# Patient Record
Sex: MALE | Race: WHITE | NOT HISPANIC OR LATINO | Employment: OTHER | URBAN - METROPOLITAN AREA
[De-identification: names, ages, dates, MRNs, and addresses within clinical notes are randomized per-mention and may not be internally consistent; named-entity substitution may affect disease eponyms.]

---

## 2017-01-04 ENCOUNTER — APPOINTMENT (OUTPATIENT)
Dept: WOUND CARE | Facility: MEDICAL CENTER | Age: 72
End: 2017-01-04
Payer: MEDICARE

## 2017-03-06 ENCOUNTER — TELEPHONE (OUTPATIENT)
Dept: ENDOCRINOLOGY | Facility: MEDICAL CENTER | Age: 72
End: 2017-03-06

## 2017-03-06 DIAGNOSIS — E11.40 TYPE 2 DIABETES MELLITUS WITH DIABETIC NEUROPATHY, WITH LONG-TERM CURRENT USE OF INSULIN (HCC): ICD-10-CM

## 2017-03-06 DIAGNOSIS — Z79.4 TYPE 2 DIABETES MELLITUS WITH DIABETIC NEUROPATHY, WITH LONG-TERM CURRENT USE OF INSULIN (HCC): ICD-10-CM

## 2017-03-06 NOTE — TELEPHONE ENCOUNTER
Kindly put a new Rx for his Humalog kwikpen and D/C Humalog vials.    Pt using 30-35 Units. Use as directed up to 100 units/day.    Rx will be send to [facility].    Thank you  Mariluz

## 2017-03-14 ENCOUNTER — TELEPHONE (OUTPATIENT)
Dept: CARDIOLOGY | Facility: MEDICAL CENTER | Age: 72
End: 2017-03-14

## 2017-03-14 NOTE — TELEPHONE ENCOUNTER
----- Message from Bebo Kathleen sent at 3/14/2017  1:46 PM PDT -----  Regarding: Pt wants to discuss medication's  JW/Marla,    Patient want's to discuss his medication's. Please call cell # [phone] to discuss.

## 2017-03-14 NOTE — TELEPHONE ENCOUNTER
Pt was notified. He'll just see Dr. Patterson on Wed for med review & adjustment & if MD's there want to discharge him on anything different he'll follow their protocol.

## 2017-03-14 NOTE — TELEPHONE ENCOUNTER
Dr. Patterson : Pt. currently receiving chemo for non- Hodgkins lymphoma at [facility]. Also suffered CVA in Dec. Coming back to [place] this Sat. Out of his valsartan HCTZ 320/25 & will need new Rx. MD's there asking if you want to continue this?  Clonidine was stopped due to hypotension. They added spironolactone 50 mg QD for low K+. Also on furosemide, labetalol, & amlodipine as per med list.  BP now runs 110-120/70. I made FV with you next Wed., but in meantime do you want to continue the valsartan HCTZ? Same dose or lower? ([facility])

## 2017-03-14 NOTE — TELEPHONE ENCOUNTER
If he is still on it then it is too much. I had not heard of any of this. I certainly agree with stopping the clonidine. If he is now on spironolactone and furosemide he probably should not also be on hydrochlorothiazide. We need to get him in as soon as possible to review all this. If he is still on the 320/25 he had to cut it in half until I see him and I will rewrite a new prescription but I am not sure why they have not adjusted his medications there since they made the other changes.  Did they know he was on the valsartan/HCT?  That was part of his older regimen and clearly needs to be reassessed but I am way behind in terms of what has been going on and do not feel comfortable adjusting his medications without looking at him. I would hope the MDs there make that decision.  I will be happy to follow him up after his back here and continue whatever regimen has been working there.

## 2017-03-22 ENCOUNTER — OFFICE VISIT (OUTPATIENT)
Dept: CARDIOLOGY | Facility: MEDICAL CENTER | Age: 72
End: 2017-03-22
Payer: MEDICARE

## 2017-03-22 VITALS
HEART RATE: 94 BPM | WEIGHT: 215 LBS | HEIGHT: 72 IN | OXYGEN SATURATION: 94 % | BODY MASS INDEX: 29.12 KG/M2 | SYSTOLIC BLOOD PRESSURE: 100 MMHG | DIASTOLIC BLOOD PRESSURE: 60 MMHG

## 2017-03-22 DIAGNOSIS — I11.9 BENIGN HYPERTENSIVE HEART DISEASE WITHOUT HEART FAILURE: ICD-10-CM

## 2017-03-22 DIAGNOSIS — I25.10 ATHEROSCLEROSIS OF NATIVE CORONARY ARTERY OF NATIVE HEART WITHOUT ANGINA PECTORIS: ICD-10-CM

## 2017-03-22 DIAGNOSIS — Z86.73 HISTORY OF ARTERIAL ISCHEMIC STROKE: ICD-10-CM

## 2017-03-22 DIAGNOSIS — E78.2 MIXED HYPERLIPIDEMIA: ICD-10-CM

## 2017-03-22 PROBLEM — C85.90 LYMPHOMA (HCC): Status: ACTIVE | Noted: 2017-02-19

## 2017-03-22 PROBLEM — I10 ESSENTIAL HYPERTENSION, BENIGN: Status: ACTIVE | Noted: 2017-03-22

## 2017-03-22 PROCEDURE — G8598 ASA/ANTIPLAT THER USED: HCPCS | Performed by: INTERNAL MEDICINE

## 2017-03-22 PROCEDURE — 4040F PNEUMOC VAC/ADMIN/RCVD: CPT | Mod: 8P | Performed by: INTERNAL MEDICINE

## 2017-03-22 PROCEDURE — 3017F COLORECTAL CA SCREEN DOC REV: CPT | Mod: 8P | Performed by: INTERNAL MEDICINE

## 2017-03-22 PROCEDURE — G8432 DEP SCR NOT DOC, RNG: HCPCS | Performed by: INTERNAL MEDICINE

## 2017-03-22 PROCEDURE — G8484 FLU IMMUNIZE NO ADMIN: HCPCS | Performed by: INTERNAL MEDICINE

## 2017-03-22 PROCEDURE — 99214 OFFICE O/P EST MOD 30 MIN: CPT | Performed by: INTERNAL MEDICINE

## 2017-03-22 PROCEDURE — 1036F TOBACCO NON-USER: CPT | Performed by: INTERNAL MEDICINE

## 2017-03-22 PROCEDURE — 1101F PT FALLS ASSESS-DOCD LE1/YR: CPT | Mod: 8P | Performed by: INTERNAL MEDICINE

## 2017-03-22 PROCEDURE — G8417 CALC BMI ABV UP PARAM F/U: HCPCS | Performed by: INTERNAL MEDICINE

## 2017-03-22 RX ORDER — ATORVASTATIN CALCIUM 40 MG/1
20 TABLET, FILM COATED ORAL DAILY
Qty: 90 TAB | Refills: 3
Start: 2017-03-22 | End: 2018-03-05 | Stop reason: SDUPTHER

## 2017-03-22 RX ORDER — SULFAMETHOXAZOLE AND TRIMETHOPRIM 800; 160 MG/1; MG/1
1 TABLET ORAL 2 TIMES DAILY
COMMUNITY
End: 2017-09-29

## 2017-03-22 RX ORDER — CALCIUM CARBONATE/VITAMIN D3 500-10/5ML
400 LIQUID (ML) ORAL DAILY
COMMUNITY
End: 2017-09-29 | Stop reason: DRUGHIGH

## 2017-03-22 RX ORDER — PREDNISONE 10 MG/1
10 TABLET ORAL DAILY
COMMUNITY
End: 2017-09-29

## 2017-03-22 RX ORDER — OMEPRAZOLE 20 MG/1
20 CAPSULE, DELAYED RELEASE ORAL DAILY
COMMUNITY
End: 2017-09-29

## 2017-03-22 RX ORDER — SPIRONOLACTONE 50 MG/1
50 TABLET, FILM COATED ORAL DAILY
COMMUNITY
End: 2017-10-09 | Stop reason: SDUPTHER

## 2017-03-22 RX ORDER — AMLODIPINE BESYLATE 5 MG/1
5 TABLET ORAL DAILY
Qty: 90 TAB | Refills: 3
Start: 2017-03-22 | End: 2017-09-22

## 2017-03-22 RX ORDER — ACYCLOVIR 400 MG/1
400 TABLET ORAL 2 TIMES DAILY
COMMUNITY
End: 2017-09-29

## 2017-03-22 RX ORDER — LORATADINE 10 MG/1
10 TABLET ORAL DAILY
COMMUNITY
End: 2017-09-29 | Stop reason: ALTCHOICE

## 2017-03-22 RX ORDER — FENOFIBRATE 200 MG/1
200 CAPSULE ORAL
COMMUNITY
End: 2017-03-22

## 2017-03-22 ASSESSMENT — ENCOUNTER SYMPTOMS
PND: 0
BRUISES/BLEEDS EASILY: 0
WHEEZING: 0
CHILLS: 0
WEAKNESS: 1
MYALGIAS: 0
NERVOUS/ANXIOUS: 0
SEIZURES: 0
HEADACHES: 0
LOSS OF CONSCIOUSNESS: 0
FOCAL WEAKNESS: 1
CLAUDICATION: 0
PALPITATIONS: 0
FALLS: 0
DEPRESSION: 0
COUGH: 0
BLOOD IN STOOL: 0
FEVER: 0
ORTHOPNEA: 0
POLYDIPSIA: 0
FLANK PAIN: 0

## 2017-03-22 ASSESSMENT — LIFESTYLE VARIABLES: SUBSTANCE_ABUSE: 0

## 2017-03-22 NOTE — Clinical Note
[facility]   [address]  [place] [address]  Phone: [phone]  Fax: [phone]              Av Bob  1945    Encounter Date: 3/22/2017    Larry Patterson M.D.          PROGRESS NOTE:  Subjective:   Av Bob is a 71 y.o. male who presents today for follow-up of coronary artery disease. This has been an extraordinary interval since his last visit. In December of last year he was visiting [facility] and developed left-sided weakness and confusion and apparently some speech slurring and was taken to [facility] where evidence of a stroke was demonstrated. He was still on his full dose aspirin plus Plavix as previously discussed because of the recommendation of his cardiologist at [facility] for extended dual antiplatelet therapy.  (I do not have records documenting the reason for this but apparently related to the complexity of his interventions in 2009.) There was no hemorrhagic component to his stroke. Apparently no significant carotid disease was demonstrated and an echocardiogram apparently did not suggest a cardiac source for embolus. He and his wife are the sources for this info.   No angina, palpitation or dyspnea. Apparently no AF noted, anticoagulation was never discussed.    Past Medical History   Diagnosis Date   • Hypertension    • Mixed hyperlipidemia    • Nephrolithiasis 2006   • Hypokalemia 2012   • Wound of left leg 2012     Requiring surgery and debridment, Dr. Moore   • Type II or unspecified type diabetes mellitus with neurological manifestations, uncontrolled 9/11/2013   • Polyneuropathy in diabetes(357.2) 9/11/2013   • Coronary atherosclerosis of native coronary artery    • S/P PTCA (percutaneous transluminal coronary angioplasty), RCA, 5/1997, patent 7/10/2009    • Presence of drug coated stent in left circumflex coronary artery      Overlapping 2.5mm Taxus  and Orange stents in OM, 7/10/2009    • Presence of drug coated stent in LAD coronary artery      Overlapping Orange 3.0 stents, 7/10/2009    • Controlled gout 2014   • CKD (chronic kidney disease) stage 3, GFR 30-59 ml/min    • Skin ulcer of calf (CMS-HCC) 2015     Dr. Terry and wound care   • Leg wound, right 9/2016     Followed in wound care   • Cerebrovascular accident (CVA) (CMS-HCC) 12/30/2016     Left hemiparesis, etiology of stroke not established, lymphoma discovered on MRI evaluation of stroke   • Lymphoma (CMS-HCC) 2/19/2017     Large cell     Past Surgical History   Procedure Laterality Date   • Tonsillectomy and adenoidectomy     • Lithotripsy     • Angioplasty  1997     RCA followed by other stents as noted above.    • Wound closure general  4/3/2012     [facility]   • Tonsillectomy and adenoidectomy     • Cataract extraction with iol       bilateral     Family History   Problem Relation Age of Onset   • Heart Disease Father      CAD   • Diabetes Father    • Cancer Mother    • Kidney stones Brother    • Heart Disease Brother      History   Smoking status   • Never Smoker    Smokeless tobacco   • Never Used     Allergies   Allergen Reactions   • Lorazepam Unspecified     Disorientation   • Nkda [No Known Drug Allergy]      Outpatient Encounter Prescriptions as of 3/22/2017   Medication Sig Dispense Refill   • nystatin (MYCOSTATIN) Powder Apply  to affected area(s) 3 times a day.     • predniSONE (DELTASONE) 10 MG Tab Take 10 mg by mouth every day.     • acyclovir (ZOVIRAX) 400 MG tablet Take 400 mg by mouth 2 times a day.     • ascorbic acid (VITAMIN C) 500 MG/5ML syrup Take  by mouth every day.     • fenofibrate micronized (LOFIBRA) 200 MG capsule Take 200 mg by mouth every morning before breakfast.     • loratadine (CLARITIN) 10 MG Tab Take 10 mg by mouth every day.     • Magnesium Oxide 400 MG Cap Take  by mouth.     • omeprazole (PRILOSEC)  20 MG delayed-release capsule Take 20 mg by mouth every day.     • spironolactone (ALDACTONE) 50 MG Tab Take 50 mg by mouth every day.     • sulfamethoxazole-trimethoprim (BACTRIM DS) 800-160 MG tablet Take 1 Tab by mouth 2 times a day.     • amlodipine (NORVASC) 5 MG Tab Take 1 Tab by mouth every day. 90 Tab 3   • insulin lispro, Human, (HUMALOG KWIKPEN) 100 UNIT/ML Solution Pen-injector injection Inject 35 Units as instructed 3 times a day before meals. 30 mL 6   • Insulin Glargine (TOUJEO SOLOSTAR SC) Inject 80 Units as instructed.     • aspirin (ASA) 81 MG Chew Tab chewable tablet Take 1 Tab by mouth every day. 100 Tab 11   • Omega-3 Fatty Acids (OMEGA-3 FISH OIL PO) Take  by mouth.     • metformin ER (GLUCOPHAGE XR) 500 MG TABLET SR 24 HR Take 1 Tab by mouth 2 times a day. 60 Tab 5   • insulin glargine (LANTUS SOLOSTAR) 100 UNIT/ML Solution Pen-injector injection Inject 72 Units as instructed every evening. 90 mL 2   • clopidogrel (PLAVIX) 75 MG Tab Take 1 Tab by mouth every day. 90 Tab 3   • valsartan-hydrochlorothiazide (DIOVAN-HCT) 320-25 MG per tablet TAKE 1 TABLET DAILY 90 Tab 3   • labetalol (NORMODYNE) 100 MG Tab TAKE 1 TABLET TWICE A  Tab 3   • potassium chloride SA (KLOR-CON M20) 20 MEQ Tab CR TAKE 2 TABLETS THREE TIMES A  Tab 3   • atorvastatin (LIPITOR) 40 MG Tab Take 1 Tab by mouth every day. 90 Tab 3   • allopurinol (ZYLOPRIM) 300 MG Tab Take 1 Tab by mouth every day. 90 Tab 3   • fenofibrate (TRICOR) 145 MG Tab Take 1 Tab by mouth every day. 90 Tab 3   • nitroglycerin (NITROSTAT) 0.4 MG SL Tab Place 1 Tab under tongue as needed for Chest Pain. 25 Tab 6   • B-D UF III MINI PEN NEEDLES 31G X 5 MM Misc USE AS DIRECTED WITH INSULIN INJECTIONS 450 Each 2   • CENTRUM SILVER PO QDAY.      • [DISCONTINUED] amlodipine (NORVASC) 5 MG Tab Take 1 Tab by mouth 2 Times a Day. 180 Tab 3   • [DISCONTINUED] furosemide (LASIX) 20 MG Tab Take 1 Tab by mouth every day. 90 Tab 3   • Cyanocobalamin  "(VITAMIN B-12) 1000 MCG SUBL Place  under tongue every Sunday.       No facility-administered encounter medications on file as of 3/22/2017.     Review of Systems   Constitutional: Negative for fever and chills.   HENT: Negative for nosebleeds.    Respiratory: Negative for cough and wheezing.    Cardiovascular: Positive for leg swelling (edema has much improved after hospitalization). Negative for chest pain, palpitations, orthopnea, claudication and PND.   Gastrointestinal: Negative for blood in stool and melena.   Genitourinary: Negative for hematuria and flank pain.   Musculoskeletal: Negative for myalgias and falls.   Skin: Negative for rash.   Neurological: Positive for focal weakness (residual left hemiparesis) and weakness (he is noting bilateral leg weakness). Negative for seizures, loss of consciousness and headaches.   Endo/Heme/Allergies: Negative for polydipsia. Does not bruise/bleed easily.   Psychiatric/Behavioral: Negative for depression and substance abuse. The patient is not nervous/anxious.         Objective:   /60 mmHg  Pulse 94  Ht 1.829 m (6' 0.01\")  Wt 97.523 kg (215 lb)  BMI 29.15 kg/m2  SpO2 94%    Physical Exam   Constitutional: He is oriented to person, place, and time. He appears well-developed and well-nourished.   Eyes: Conjunctivae are normal. No scleral icterus.   Neck: No JVD present.   Cardiovascular: Normal rate, regular rhythm, normal heart sounds and intact distal pulses.  Exam reveals no gallop.    No murmur heard.  Pulmonary/Chest: Effort normal and breath sounds normal.   Musculoskeletal: He exhibits no edema.   Neurological: He is alert and oriented to person, place, and time.   Skin: Skin is warm and dry.   Psychiatric: He has a normal mood and affect. Thought content normal.       Assessment:     1. Atherosclerosis of native coronary artery of native heart without angina pectoris     2. Benign hypertensive heart disease without heart failure      COMP METABOLIC " PANEL    BASIC METABOLIC PANEL   3. Mixed hyperlipidemia  LIPID PANEL    TSH   4. History of arterial ischemic stroke       His coronary disease is clinically stable. His blood pressure is over controlled and his oncologist in [place] would certainly like him on less medicine. He is on a complex combination of medications now some of which were begun during his initial cardiovascular care at [facility] years ago and some here and then some added during his recent stays at [facility]. The question of duration of Plavix therapy continues since he is over 8 years out from his last stent and the recent stroke occurred while he was on aspirin and Plavix and therefore they weren't protective from that. I'm still not sure what the etiology of that event was.  He is also now on a higher dose of fenofibrate and still 40 mg of atorvastatin. He's also on furosemide and HCTZ plus spironolactone.  His edema resolved and seemed to relate to his prolonged recovery from the complex illness beginning with a stroke and culminating in the diagnosis and treatment of lymphoma.  I'm also concerned about the possible muscle effects of the high-dose fenofibrate and atorvastatin at 40 mg daily.    Medical Decision Making:  Today's Assessment / Status / Plan:     Stop furosemide. Continue the spironolactone. He may be better served with carvedilol at this point than with labetalol but I didn't change that today. Continue the potassium for now since he tends to be significantly hypokalemic but check potassium within 5 days on the current doses of valsartan, hydrochlorothiazide and spironolactone.   Reduce the fenofibrate back to 145 mg daily and reduce atorvastatin to 20 mg daily.  Check the CMP, lipids and TSH in 3-4 weeks.  Discuss with his oncologist, Dr. Fausto Rivera, the advisability of stopping Plavix and/or reducing aspirin dose at this point ( he is actually still on 325mg daily) and I suggested he discuss that with his current  cardiologist in [place] who has replaced Dr. Levine since he may have access to the records documenting the reason for the extended dual antiplatelet therapy. His score for extended DAPT is ~ 2 based on the information I have but bleeding risk may now preclude it plus he is 8+ years out. Dr. Rivera is also supervising his cardio oncology follow-up with scheduled reassessments of left ventricular function at [facility]. I requested the records detailing the nature and workup of his stroke. He will continue follow-up with Dr. Moon as well. Use of emergency medical system reviewed for any unstable symptoms.      No Recipients

## 2017-03-22 NOTE — MR AVS SNAPSHOT
"Av Bob   3/22/2017 8:20 AM   Office Visit   MRN: [id number]    Department:  [facility]   Dept Phone:  [phone]    Description:  Male : 1945   Provider:  Larry Patterson M.D.           Allergies as of 3/22/2017     Allergen Noted Reactions    Nkda [No Known Drug Allergy] 2008         You were diagnosed with     Benign hypertensive heart disease without heart failure   [402.10.ICD-9-CM]       Mixed hyperlipidemia   [272.2.ICD-9-CM]         Vital Signs     Blood Pressure Pulse Height Weight Body Mass Index Oxygen Saturation    100/60 mmHg 94 1.829 m (6' 0.01\") 97.523 kg (215 lb) 29.15 kg/m2 94%    Smoking Status                   Never Smoker            Basic Information     Date Of Birth Sex Race Ethnicity Preferred Language    1945 Male White Non- English      Your appointments     Mar 23, 2017  9:00 AM   Diabetes Care Visit with Trinidad Maguire M.D., ENDOCRINOLOGY DIABETES RN   [facility] ([facility])    [address]  [facility] [address]   [phone]           You will be receiving a confirmation call a few days before your appointment from our automated call confirmation system.            2017 11:40 AM   FOLLOW UP with Larry Patterson M.D.   [facility] (--)    [address]  [place] [address] [phone]            2017 11:00 AM   FOLLOW UP with Larry Patterson M.D.   [facility] (--)    [address]  [place] [id number] [phone]              Problem List              ICD-10-CM Priority Class Noted - Resolved    Presence of drug coated stent in LAD coronary artery Z95.5   2011 - Present    Presence of drug coated stent in left circumflex coronary artery Z95.5   2011 - Present    S/P PTCA (percutaneous transluminal coronary " angioplasty), RCA, 5/1997, patent 7/10/2009 Z98.61   12/13/2011 - Present    Mixed hyperlipidemia E78.2   12/13/2011 - Present    Benign hypertensive heart disease without heart failure I11.9   12/13/2011 - Present    Hypokalemia E87.6   12/20/2012 - Present    Diabetic polyneuropathy (CMS-HCC) E11.42   9/11/2013 - Present    Encounter for long-term (current) use of insulin (CMS-HCC) Z79.4   9/11/2013 - Present    Controlled gout M10.9   1/28/2014 - Present    Coronary atherosclerosis of native coronary artery I25.10   5/7/2014 - Present    Old myocardial infarction I25.2   5/7/2014 - Present    Skin ulcer of calf (CMS-HCC) L97.209   11/23/2015 - Present    Long-term use of high-risk medication Z79.899   8/18/2016 - Present    DM (diabetes mellitus) type II controlled, neurological manifestation (CMS-HCC) E11.49   8/25/2016 - Present    CKD (chronic kidney disease) stage 3, GFR 30-59 ml/min N18.3   8/25/2016 - Present      Health Maintenance        Date Due Completion Dates    IMM DTaP/Tdap/Td Vaccine (1 - Tdap) 3/30/1966 ---    COLONOSCOPY 3/30/1997 ---    IMM ZOSTER VACCINE 3/30/2007 ---    IMM PNEUMOCOCCAL 65+ (ADULT) LOW/MEDIUM RISK SERIES (1 of 2 - PCV13) 3/30/2012 ---    URINE ACR / MICROALBUMIN 5/21/2016 5/21/2015, 1/17/2014, 12/17/2012, 11/29/2011, 12/27/2010    IMM INFLUENZA (1) 9/1/2016 ---    A1C SCREENING 6/14/2017 12/14/2016, 8/19/2016, 1/9/2016, 11/23/2015, 5/21/2015, 6/17/2014, 3/26/2014, 1/17/2014, 9/11/2013, 9/11/2013 (Done), 7/10/2012, 11/29/2011, 12/27/2010, 11/5/2006    Override on 9/11/2013: Done (11.0)    RETINAL SCREENING 8/17/2017 8/17/2016, 8/17/2016 (Done), 1/29/2014, 9/4/2013    Override on 8/17/2016: Done    FASTING LIPID PROFILE 8/19/2017 8/19/2016, 1/9/2016, 5/21/2015, 6/17/2014, 1/17/2014, 4/17/2013, 12/17/2012, 11/29/2011, 12/27/2010    SERUM CREATININE 8/19/2017 8/19/2016, 1/9/2016, 5/21/2015, 6/17/2014, 1/17/2014, 6/18/2013, 12/17/2012, 12/17/2012, 7/10/2012, 3/21/2012, 11/29/2011,  12/27/2010, 5/28/2008, 11/8/2006, 11/7/2006, 11/6/2006, 11/6/2006, 11/5/2006, 11/4/2006, 11/3/2006    DIABETES MONOFILAMENT / LE EXAM 9/7/2017 9/7/2016, 11/23/2015 (N/S), 5/22/2015 (Done), 3/26/2014, 9/11/2013 (Done)    Override on 11/23/2015: (N/S)    Override on 5/22/2015: Done    Override on 9/11/2013: Done            Current Immunizations     No immunizations on file.      Below and/or attached are the medications your provider expects you to take. Review all of your home medications and newly ordered medications with your provider and/or pharmacist. Follow medication instructions as directed by your provider and/or pharmacist. Please keep your medication list with you and share with your provider. Update the information when medications are discontinued, doses are changed, or new medications (including over-the-counter products) are added; and carry medication information at all times in the event of emergency situations     Allergies:  NKDA - (reactions not documented)               Medications  Valid as of: March 22, 2017 -  9:20 AM    Generic Name Brand Name Tablet Size Instructions for use    Acyclovir (Tab) ZOVIRAX 400 MG Take 400 mg by mouth 2 times a day.        Allopurinol (Tab) ZYLOPRIM 300 MG Take 1 Tab by mouth every day.        AmLODIPine Besylate (Tab) NORVASC 5 MG Take 1 Tab by mouth every day.        Ascorbic Acid (Syrup) VITAMIN C 500 MG/5ML Take  by mouth every day.        Aspirin (Chew Tab) ASA 81 MG Take 1 Tab by mouth every day.        Atorvastatin Calcium (Tab) LIPITOR 40 MG Take 1 Tab by mouth every day.        Clopidogrel Bisulfate (Tab) PLAVIX 75 MG Take 1 Tab by mouth every day.        Cyanocobalamin (SL Tab) Vitamin B-12 1000 MCG Place  under tongue every Sunday.        Fenofibrate (Tab) TRICOR 145 MG Take 1 Tab by mouth every day.        Fenofibrate Micronized (Cap) LOFIBRA 200 MG Take 200 mg by mouth every morning before breakfast.        Insulin Glargine (Solution Pen-injector)  LANTUS 100 UNIT/ML Inject 72 Units as instructed every evening.        Insulin Glargine   Inject 80 Units as instructed.        Insulin Lispro (Solution Pen-injector) HUMALOG 100 UNIT/ML Inject 35 Units as instructed 3 times a day before meals.        Insulin Pen Needle (Misc) B-D UF III MINI PEN NEEDLES 31G X 5 MM USE AS DIRECTED WITH INSULIN INJECTIONS        Labetalol HCl (Tab) NORMODYNE 100 MG TAKE 1 TABLET TWICE A DAY        Loratadine (Tab) CLARITIN 10 MG Take 10 mg by mouth every day.        Magnesium Oxide (Cap) Magnesium Oxide 400 MG Take  by mouth.        MetFORMIN HCl (TABLET SR 24 HR) GLUCOPHAGE  MG Take 1 Tab by mouth 2 times a day.        Multiple Vitamins-Minerals   QDAY.         Nitroglycerin (SL Tab) NITROSTAT 0.4 MG Place 1 Tab under tongue as needed for Chest Pain.        Nystatin (Powder) MYCOSTATIN  Apply  to affected area(s) 3 times a day.        Omega-3 Fatty Acids   Take  by mouth.        Omeprazole (CAPSULE DELAYED RELEASE) PRILOSEC 20 MG Take 20 mg by mouth every day.        Potassium Chloride Jenelle CR (Tab CR) Kdur 20 MEQ TAKE 2 TABLETS THREE TIMES A DAY        PredniSONE (Tab) DELTASONE 10 MG Take 10 mg by mouth every day.        Spironolactone (Tab) ALDACTONE 50 MG Take 50 mg by mouth every day.        Sulfamethoxazole-Trimethoprim (Tab) BACTRIM -160 MG Take 1 Tab by mouth 2 times a day.        Valsartan-Hydrochlorothiazide (Tab) DIOVAN--25 MG TAKE 1 TABLET DAILY        .                 Medicines prescribed today were sent to:     [facility] - [place], [place] - [address] [facility]    [address] [facility] [facility] [address]    Phone: [phone] Fax: [phone]    Open 24 Hours?: No    [facility] - [address] [facility] [address]    Phone: [phone] Fax: [phone]    Open 24 Hours?: No      Medication refill instructions:       If your  prescription bottle indicates you have medication refills left, it is not necessary to call your provider’s office. Please contact your pharmacy and they will refill your medication.    If your prescription bottle indicates you do not have any refills left, you may request refills at any time through one of the following ways: The online [facility] system (except Urgent Care), by calling your provider’s office, or by asking your pharmacy to contact your provider’s office with a refill request. Medication refills are processed only during regular business hours and may not be available until the next business day. Your provider may request additional information or to have a follow-up visit with you prior to refilling your medication.   *Please Note: Medication refills are assigned a new Rx number when refilled electronically. Your pharmacy may indicate that no refills were authorized even though a new prescription for the same medication is available at the pharmacy. Please request the medicine by name with the pharmacy before contacting your provider for a refill.           [facility] Access Code: Activation code not generated  Current [facility] Status: Active

## 2017-03-22 NOTE — PROGRESS NOTES
Subjective:   Av Bob is a 71 y.o. male who presents today for follow-up of coronary artery disease. This has been an extraordinary interval since his last visit. In December of last year he was visiting [facility] and developed left-sided weakness and confusion and apparently some speech slurring and was taken to [facility] where evidence of a stroke was demonstrated. He was still on his full dose aspirin plus Plavix as previously discussed because of the recommendation of his cardiologist at [facility] for extended dual antiplatelet therapy.  (I do not have records documenting the reason for this but apparently related to the complexity of his interventions in 2009.) There was no hemorrhagic component to his stroke. Apparently no significant carotid disease was demonstrated and an echocardiogram apparently did not suggest a cardiac source for embolus. He and his wife are the sources for this info.   No angina, palpitation or dyspnea. Apparently no AF noted, anticoagulation was never discussed.    Past Medical History   Diagnosis Date   • Hypertension    • Mixed hyperlipidemia    • Nephrolithiasis 2006   • Hypokalemia 2012   • Wound of left leg 2012     Requiring surgery and debridment, Dr. Moore   • Type II or unspecified type diabetes mellitus with neurological manifestations, uncontrolled 9/11/2013   • Polyneuropathy in diabetes(357.2) 9/11/2013   • Coronary atherosclerosis of native coronary artery    • S/P PTCA (percutaneous transluminal coronary angioplasty), RCA, 5/1997, patent 7/10/2009    • Presence of drug coated stent in left circumflex coronary artery      Overlapping 2.5mm Taxus and Dongola stents in OM, 7/10/2009    • Presence of drug coated stent in LAD coronary artery      Overlapping Dongola 3.0 stents, 7/10/2009    • Controlled gout 2014   • CKD (chronic kidney disease) stage 3, GFR 30-59 ml/min    • Skin ulcer of calf (CMS-[facility]) 2015     Dr. Terry and  wound care   • Leg wound, right 9/2016     Followed in wound care   • Cerebrovascular accident (CVA) (CMS-HCC) 12/30/2016     Left hemiparesis, etiology of stroke not established, lymphoma discovered on MRI evaluation of stroke   • Lymphoma (CMS-HCC) 2/19/2017     Large cell     Past Surgical History   Procedure Laterality Date   • Tonsillectomy and adenoidectomy     • Lithotripsy     • Angioplasty  1997     RCA followed by other stents as noted above.    • Wound closure general  4/3/2012     Performed by GERHARD GILBERT at SURGERY SAME DAY [facility] ORS   • Tonsillectomy and adenoidectomy     • Cataract extraction with iol       bilateral     Family History   Problem Relation Age of Onset   • Heart Disease Father      CAD   • Diabetes Father    • Cancer Mother    • Kidney stones Brother    • Heart Disease Brother      History   Smoking status   • Never Smoker    Smokeless tobacco   • Never Used     Allergies   Allergen Reactions   • Lorazepam Unspecified     Disorientation   • Nkda [No Known Drug Allergy]      Outpatient Encounter Prescriptions as of 3/22/2017   Medication Sig Dispense Refill   • nystatin (MYCOSTATIN) Powder Apply  to affected area(s) 3 times a day.     • predniSONE (DELTASONE) 10 MG Tab Take 10 mg by mouth every day.     • acyclovir (ZOVIRAX) 400 MG tablet Take 400 mg by mouth 2 times a day.     • ascorbic acid (VITAMIN C) 500 MG/5ML syrup Take  by mouth every day.     • fenofibrate micronized (LOFIBRA) 200 MG capsule Take 200 mg by mouth every morning before breakfast.     • loratadine (CLARITIN) 10 MG Tab Take 10 mg by mouth every day.     • Magnesium Oxide 400 MG Cap Take  by mouth.     • omeprazole (PRILOSEC) 20 MG delayed-release capsule Take 20 mg by mouth every day.     • spironolactone (ALDACTONE) 50 MG Tab Take 50 mg by mouth every day.     • sulfamethoxazole-trimethoprim (BACTRIM DS) 800-160 MG tablet Take 1 Tab by mouth 2 times a day.     • amlodipine (NORVASC) 5 MG Tab Take 1 Tab by  mouth every day. 90 Tab 3   • insulin lispro, Human, (HUMALOG KWIKPEN) 100 UNIT/ML Solution Pen-injector injection Inject 35 Units as instructed 3 times a day before meals. 30 mL 6   • Insulin Glargine (TOUJEO SOLOSTAR SC) Inject 80 Units as instructed.     • aspirin (ASA) 81 MG Chew Tab chewable tablet Take 1 Tab by mouth every day. 100 Tab 11   • Omega-3 Fatty Acids (OMEGA-3 FISH OIL PO) Take  by mouth.     • metformin ER (GLUCOPHAGE XR) 500 MG TABLET SR 24 HR Take 1 Tab by mouth 2 times a day. 60 Tab 5   • insulin glargine (LANTUS SOLOSTAR) 100 UNIT/ML Solution Pen-injector injection Inject 72 Units as instructed every evening. 90 mL 2   • clopidogrel (PLAVIX) 75 MG Tab Take 1 Tab by mouth every day. 90 Tab 3   • valsartan-hydrochlorothiazide (DIOVAN-HCT) 320-25 MG per tablet TAKE 1 TABLET DAILY 90 Tab 3   • labetalol (NORMODYNE) 100 MG Tab TAKE 1 TABLET TWICE A  Tab 3   • potassium chloride SA (KLOR-CON M20) 20 MEQ Tab CR TAKE 2 TABLETS THREE TIMES A  Tab 3   • atorvastatin (LIPITOR) 40 MG Tab Take 1 Tab by mouth every day. 90 Tab 3   • allopurinol (ZYLOPRIM) 300 MG Tab Take 1 Tab by mouth every day. 90 Tab 3   • fenofibrate (TRICOR) 145 MG Tab Take 1 Tab by mouth every day. 90 Tab 3   • nitroglycerin (NITROSTAT) 0.4 MG SL Tab Place 1 Tab under tongue as needed for Chest Pain. 25 Tab 6   • B-D UF III MINI PEN NEEDLES 31G X 5 MM Misc USE AS DIRECTED WITH INSULIN INJECTIONS 450 Each 2   • CENTRUM SILVER PO QDAY.      • [DISCONTINUED] amlodipine (NORVASC) 5 MG Tab Take 1 Tab by mouth 2 Times a Day. 180 Tab 3   • [DISCONTINUED] furosemide (LASIX) 20 MG Tab Take 1 Tab by mouth every day. 90 Tab 3   • Cyanocobalamin (VITAMIN B-12) 1000 MCG SUBL Place  under tongue every Sunday.       No facility-administered encounter medications on file as of 3/22/2017.     Review of Systems   Constitutional: Negative for fever and chills.   HENT: Negative for nosebleeds.    Respiratory: Negative for cough and wheezing.  "   Cardiovascular: Positive for leg swelling (edema has much improved after hospitalization). Negative for chest pain, palpitations, orthopnea, claudication and PND.   Gastrointestinal: Negative for blood in stool and melena.   Genitourinary: Negative for hematuria and flank pain.   Musculoskeletal: Negative for myalgias and falls.   Skin: Negative for rash.   Neurological: Positive for focal weakness (residual left hemiparesis) and weakness (he is noting bilateral leg weakness). Negative for seizures, loss of consciousness and headaches.   Endo/Heme/Allergies: Negative for polydipsia. Does not bruise/bleed easily.   Psychiatric/Behavioral: Negative for depression and substance abuse. The patient is not nervous/anxious.         Objective:   /60 mmHg  Pulse 94  Ht 1.829 m (6' 0.01\")  Wt 97.523 kg (215 lb)  BMI 29.15 kg/m2  SpO2 94%    Physical Exam   Constitutional: He is oriented to person, place, and time. He appears well-developed and well-nourished.   Eyes: Conjunctivae are normal. No scleral icterus.   Neck: No JVD present.   Cardiovascular: Normal rate, regular rhythm, normal heart sounds and intact distal pulses.  Exam reveals no gallop.    No murmur heard.  Pulmonary/Chest: Effort normal and breath sounds normal.   Musculoskeletal: He exhibits no edema.   Neurological: He is alert and oriented to person, place, and time.   Skin: Skin is warm and dry.   Psychiatric: He has a normal mood and affect. Thought content normal.       Assessment:     1. Atherosclerosis of native coronary artery of native heart without angina pectoris     2. Benign hypertensive heart disease without heart failure      COMP METABOLIC PANEL    BASIC METABOLIC PANEL   3. Mixed hyperlipidemia  LIPID PANEL    TSH   4. History of arterial ischemic stroke       His coronary disease is clinically stable. His blood pressure is over controlled and his oncologist in [place] would certainly like him on less medicine. He is on a " complex combination of medications now some of which were begun during his initial cardiovascular care at [facility] years ago and some here and then some added during his recent stays at [facility]. The question of duration of Plavix therapy continues since he is over 8 years out from his last stent and the recent stroke occurred while he was on aspirin and Plavix and therefore they weren't protective from that. I'm still not sure what the etiology of that event was.  He is also now on a higher dose of fenofibrate and still 40 mg of atorvastatin. He's also on furosemide and HCTZ plus spironolactone.  His edema resolved and seemed to relate to his prolonged recovery from the complex illness beginning with a stroke and culminating in the diagnosis and treatment of lymphoma.  I'm also concerned about the possible muscle effects of the high-dose fenofibrate and atorvastatin at 40 mg daily.    Medical Decision Making:  Today's Assessment / Status / Plan:     Stop furosemide. Continue the spironolactone. He may be better served with carvedilol at this point than with labetalol but I didn't change that today.  Continue the potassium for now since he tends to be significantly hypokalemic but check potassium within 5 days on the current doses of valsartan, hydrochlorothiazide and spironolactone.   Reduce the fenofibrate back to 145 mg daily and reduce atorvastatin to 20 mg daily.  Check the CMP, lipids and TSH in 3-4 weeks.  Discuss with his oncologist, Dr. Fausto Rivera, the advisability of stopping Plavix and/or reducing aspirin dose (he is actually still on 325mg daily) at this point and I suggested he discuss that with his current cardiologist in [place] who has replaced Dr. Levine since he may have access to the records documenting the reason for the extended dual antiplatelet therapy. His score for extended DAPT is ~ 2 based on the information I have but bleeding risk may now preclude it plus he is 8+ years out.  Dr. Rivera is also supervising his cardio oncology follow-up with scheduled reassessments of left ventricular function at [facility]. I requested the records detailing the nature and workup of his stroke. He will continue follow-up with Dr. Moon as well. Use of emergency medical system reviewed for any unstable symptoms.  Addendum: Discussed with Dr. Rivera and he has no problem with continuing dual antiplatelet therapy for at least the near term. The curious thing about his stroke is no other etiology for it was demonstrated and it is presumed to be hypercoagulability associated with his lymphoma. The abnormalities noted on MRI resolved before chemotherapy implying that they were presumably ischemic and had nothing to do with his lymphoma.

## 2017-03-23 ENCOUNTER — OFFICE VISIT (OUTPATIENT)
Dept: ENDOCRINOLOGY | Facility: MEDICAL CENTER | Age: 72
End: 2017-03-23
Payer: MEDICARE

## 2017-03-23 VITALS
WEIGHT: 217.6 LBS | DIASTOLIC BLOOD PRESSURE: 84 MMHG | HEIGHT: 72 IN | SYSTOLIC BLOOD PRESSURE: 126 MMHG | HEART RATE: 94 BPM | BODY MASS INDEX: 29.47 KG/M2 | OXYGEN SATURATION: 96 %

## 2017-03-23 DIAGNOSIS — I10 ESSENTIAL HYPERTENSION: ICD-10-CM

## 2017-03-23 DIAGNOSIS — E11.40 TYPE 2 DIABETES MELLITUS WITH DIABETIC NEUROPATHY, WITH LONG-TERM CURRENT USE OF INSULIN (HCC): ICD-10-CM

## 2017-03-23 DIAGNOSIS — E78.2 MIXED HYPERLIPIDEMIA: ICD-10-CM

## 2017-03-23 DIAGNOSIS — Z79.4 TYPE 2 DIABETES MELLITUS WITH DIABETIC NEUROPATHY, WITH LONG-TERM CURRENT USE OF INSULIN (HCC): ICD-10-CM

## 2017-03-23 LAB
HBA1C MFR BLD: 9.4 % (ref ?–5.8)
INT CON NEG: NORMAL
INT CON POS: NORMAL

## 2017-03-23 PROCEDURE — 83036 HEMOGLOBIN GLYCOSYLATED A1C: CPT | Performed by: INTERNAL MEDICINE

## 2017-03-23 PROCEDURE — G8598 ASA/ANTIPLAT THER USED: HCPCS | Performed by: INTERNAL MEDICINE

## 2017-03-23 PROCEDURE — 99214 OFFICE O/P EST MOD 30 MIN: CPT | Mod: 25 | Performed by: INTERNAL MEDICINE

## 2017-03-23 PROCEDURE — 4040F PNEUMOC VAC/ADMIN/RCVD: CPT | Mod: 8P | Performed by: INTERNAL MEDICINE

## 2017-03-23 PROCEDURE — 3046F HEMOGLOBIN A1C LEVEL >9.0%: CPT | Performed by: INTERNAL MEDICINE

## 2017-03-23 PROCEDURE — 1101F PT FALLS ASSESS-DOCD LE1/YR: CPT | Mod: 8P | Performed by: INTERNAL MEDICINE

## 2017-03-23 PROCEDURE — 3017F COLORECTAL CA SCREEN DOC REV: CPT | Mod: 8P | Performed by: INTERNAL MEDICINE

## 2017-03-23 PROCEDURE — G8484 FLU IMMUNIZE NO ADMIN: HCPCS | Performed by: INTERNAL MEDICINE

## 2017-03-23 PROCEDURE — 1036F TOBACCO NON-USER: CPT | Performed by: INTERNAL MEDICINE

## 2017-03-23 PROCEDURE — G8417 CALC BMI ABV UP PARAM F/U: HCPCS | Performed by: INTERNAL MEDICINE

## 2017-03-23 RX ORDER — ASPIRIN 325 MG
325 TABLET ORAL EVERY 6 HOURS PRN
COMMUNITY
End: 2017-09-25 | Stop reason: DRUGHIGH

## 2017-03-23 NOTE — PROGRESS NOTES
Patient with existing type diabetes:  Type 2 diabetes here for follow up.      Patient's health status since last visit: States he had a CVA on 12/31/16 (still having residual weakness on the left side), diagnosed with non hodgkin's lymphoma.  Going to [facility] for chemo therapy (chemo times 1 week, off 2 weeks) for a total of 6 treatment.  States he gets Prednisone while on chemo  Issues with diabetes since last visit none.   Current Diabetes Medications: Toujeo 10-20 units at hs, Humalog 8-20 units with meals and Metformin 1000 mg bid.     HbA1c: @hba1c@  Lab Results   Component Value Date/Time    GLYCOHEMOGLOBIN 9.4 03/23/2017 08:46 AM        FSBS  Testing: testing 2-3 times per day    Hypoglycemia: denies   Exercise: none    Retinal Exam:current.     Daily Foot Exam: checks feet on occasion.

## 2017-03-23 NOTE — MR AVS SNAPSHOT
Av Landakp   3/23/2017 9:00 AM   Office Visit   MRN: [id number]    Department:  [facility]   Dept Phone:  [phone]    Description:  Male : 1945   Provider:  Trinidad Maguire M.D.; ENDOCRINOLOGY DIABETES RN           Reason for Visit     Diabetes Mellitus           Allergies as of 3/23/2017     Allergen Noted Reactions    Lorazepam 2017   Unspecified    Disorientation    Nkda [No Known Drug Allergy] 2008         You were diagnosed with     Controlled type 2 diabetes mellitus with diabetic neuropathy, with long-term current use of insulin (CMS-[facility])   [[id number]]         Vital Signs     Blood Pressure Pulse Height Weight Body Mass Index Oxygen Saturation    126/84 mmHg 94 1.829 m (6') 98.703 kg (217 lb 9.6 oz) 29.51 kg/m2 96%    Smoking Status                   Never Smoker            Basic Information     Date Of Birth Sex Race Ethnicity Preferred Language    1945 Male White Non- English      Your appointments     2017 11:40 AM   FOLLOW UP with Larry Patterson M.D.   [facility] (--)    [address]  [facility] [address]   [phone]            2017 11:00 AM   FOLLOW UP with Larry Patterson M.D.   [facility] (--)    [address]  [facility] [id number] [phone]            2017 11:20 AM   Diabetes Care Visit with Trinidad Maguire M.D., ENDOCRINOLOGY DIABETES RN   [facility])    [address]  [facility] [address] [phone]           You will be receiving a confirmation call a few days before your appointment from our automated call confirmation system.              Problem List              ICD-10-CM Priority Class Noted - Resolved    Presence of drug coated stent in LAD coronary artery Z95.5   2011 - Present    Presence of  drug coated stent in left circumflex coronary artery Z95.5   12/13/2011 - Present    S/P PTCA (percutaneous transluminal coronary angioplasty), RCA, 5/1997, patent 7/10/2009 Z98.61   12/13/2011 - Present    Mixed hyperlipidemia E78.2   12/13/2011 - Present    Benign hypertensive heart disease without heart failure I11.9   12/13/2011 - Present    Hypokalemia E87.6   12/20/2012 - Present    Diabetic polyneuropathy (CMS-HCC) E11.42   9/11/2013 - Present    Encounter for long-term (current) use of insulin (CMS-HCC) Z79.4   9/11/2013 - Present    Controlled gout M10.9   1/28/2014 - Present    Coronary atherosclerosis of native coronary artery I25.10   Unknown - Present    Old myocardial infarction I25.2   5/7/2014 - Present    DM (diabetes mellitus) type II controlled, neurological manifestation (CMS-HCC) E11.49   8/25/2016 - Present    CKD (chronic kidney disease) stage 3, GFR 30-59 ml/min N18.3   8/25/2016 - Present    Lymphoma (CMS-HCC) C85.90   2/19/2017 - Present    Type 2 diabetes mellitus treated with insulin (CMS-HCC) E11.9, Z79.4   12/30/2016 - Present    Essential hypertension, benign I10   3/22/2017 - Present    History of arterial ischemic stroke Z86.73   3/22/2017 - Present      Health Maintenance        Date Due Completion Dates    IMM DTaP/Tdap/Td Vaccine (1 - Tdap) 3/30/1966 ---    COLONOSCOPY 3/30/1997 ---    IMM ZOSTER VACCINE 3/30/2007 ---    IMM PNEUMOCOCCAL 65+ (ADULT) HIGH/HIGHEST RISK SERIES (1 of 2 - PCV13) 3/30/2012 ---    URINE ACR / MICROALBUMIN 5/21/2016 5/21/2015, 1/17/2014, 12/17/2012, 11/29/2011, 12/27/2010    IMM INFLUENZA (1) 9/1/2016 ---    A1C SCREENING 6/14/2017 12/14/2016, 8/19/2016, 1/9/2016, 11/23/2015, 5/21/2015, 6/17/2014, 3/26/2014, 1/17/2014, 9/11/2013, 9/11/2013 (Done), 7/10/2012, 11/29/2011, 12/27/2010, 11/5/2006    Override on 9/11/2013: Done (11.0)    RETINAL SCREENING 8/17/2017 8/17/2016, 8/17/2016 (Done), 1/29/2014, 9/4/2013    Override on 8/17/2016: Done    FASTING LIPID  PROFILE 8/19/2017 8/19/2016, 1/9/2016, 5/21/2015, 6/17/2014, 1/17/2014, 4/17/2013, 12/17/2012, 11/29/2011, 12/27/2010    SERUM CREATININE 8/19/2017 8/19/2016, 1/9/2016, 5/21/2015, 6/17/2014, 1/17/2014, 6/18/2013, 12/17/2012, 12/17/2012, 7/10/2012, 3/21/2012, 11/29/2011, 12/27/2010, 5/28/2008, 11/8/2006, 11/7/2006, 11/6/2006, 11/6/2006, 11/5/2006, 11/4/2006, 11/3/2006    DIABETES MONOFILAMENT / LE EXAM 9/7/2017 9/7/2016, 11/23/2015 (N/S), 5/22/2015 (Done), 3/26/2014, 9/11/2013 (Done)    Override on 11/23/2015: (N/S)    Override on 5/22/2015: Done    Override on 9/11/2013: Done            Results     POCT Hemoglobin A1C      Component    Glycohemoglobin    9.4    Comment:     lot [id number]  10/18    Internal Control Negative    Internal Control Positive                        Current Immunizations     No immunizations on file.      Below and/or attached are the medications your provider expects you to take. Review all of your home medications and newly ordered medications with your provider and/or pharmacist. Follow medication instructions as directed by your provider and/or pharmacist. Please keep your medication list with you and share with your provider. Update the information when medications are discontinued, doses are changed, or new medications (including over-the-counter products) are added; and carry medication information at all times in the event of emergency situations     Allergies:  LORAZEPAM - Unspecified     NKDA - (reactions not documented)               Medications  Valid as of: March 23, 2017 -  9:03 AM    Generic Name Brand Name Tablet Size Instructions for use    Acyclovir (Tab) ZOVIRAX 400 MG Take 400 mg by mouth 2 times a day.        Allopurinol (Tab) ZYLOPRIM 300 MG Take 1 Tab by mouth every day.        AmLODIPine Besylate (Tab) NORVASC 5 MG Take 1 Tab by mouth every day.        Ascorbic Acid (Syrup) VITAMIN C 500 MG/5ML Take  by mouth every day.        Aspirin (Tab)  MG Take 325 mg by  mouth every 6 hours as needed.        Atorvastatin Calcium (Tab) LIPITOR 40 MG Take 0.5 Tabs by mouth every day.        Clopidogrel Bisulfate (Tab) PLAVIX 75 MG Take 1 Tab by mouth every day.        Cyanocobalamin (SL Tab) Vitamin B-12 1000 MCG Place  under tongue every Sunday.        Fenofibrate (Tab) TRICOR 145 MG Take 1 Tab by mouth every day.        Insulin Glargine (Solution Pen-injector) LANTUS 100 UNIT/ML Inject 72 Units as instructed every evening.        Insulin Glargine   Inject 10-20 Units as instructed.        Insulin Lispro (Solution Pen-injector) HUMALOG 100 UNIT/ML Using up to 50 units per day as directed.        Insulin Pen Needle (Misc) B-D UF III MINI PEN NEEDLES 31G X 5 MM USE AS DIRECTED WITH INSULIN INJECTIONS        Labetalol HCl (Tab) NORMODYNE 100 MG TAKE 1 TABLET TWICE A DAY        Loratadine (Tab) CLARITIN 10 MG Take 10 mg by mouth every day.        Magnesium Oxide (Cap) Magnesium Oxide 400 MG Take  by mouth.        MetFORMIN HCl (TABLET SR 24 HR) GLUCOPHAGE  MG Take 1 Tab by mouth 2 times a day.        Multiple Vitamins-Minerals   QDAY.         Nitroglycerin (SL Tab) NITROSTAT 0.4 MG Place 1 Tab under tongue as needed for Chest Pain.        Nystatin (Powder) MYCOSTATIN  Apply  to affected area(s) 3 times a day.        Omega-3 Fatty Acids   Take  by mouth.        Omeprazole (CAPSULE DELAYED RELEASE) PRILOSEC 20 MG Take 20 mg by mouth every day.        Potassium Chloride Jenelle CR (Tab CR) Kdur 20 MEQ TAKE 2 TABLETS THREE TIMES A DAY        PredniSONE (Tab) DELTASONE 10 MG Take 10 mg by mouth every day.        Spironolactone (Tab) ALDACTONE 50 MG Take 50 mg by mouth every day.        Sulfamethoxazole-Trimethoprim (Tab) BACTRIM -160 MG Take 1 Tab by mouth 2 times a day.        Valsartan-Hydrochlorothiazide (Tab) DIOVAN--25 MG TAKE 1 TABLET DAILY        .                 Medicines prescribed today were sent to:     [id number] - [place], [place] - [address] [address]  [facility]    [address] [place] [address]    Phone: [phone] Fax: [phone]    Open 24 Hours?: No    [facility] - [place], [place] - [address] [place] [address]    [address] [facility] [address]    Phone: [phone] Fax: [phone]    Open 24 Hours?: No      Medication refill instructions:       If your prescription bottle indicates you have medication refills left, it is not necessary to call your provider’s office. Please contact your pharmacy and they will refill your medication.    If your prescription bottle indicates you do not have any refills left, you may request refills at any time through one of the following ways: The online [employer] system (except Urgent Care), by calling your provider’s office, or by asking your pharmacy to contact your provider’s office with a refill request. Medication refills are processed only during regular business hours and may not be available until the next business day. Your provider may request additional information or to have a follow-up visit with you prior to refilling your medication.   *Please Note: Medication refills are assigned a new Rx number when refilled electronically. Your pharmacy may indicate that no refills were authorized even though a new prescription for the same medication is available at the pharmacy. Please request the medicine by name with the pharmacy before contacting your provider for a refill.           [employer] Access Code: Activation code not generated  Current [employer] Status: Active

## 2017-03-23 NOTE — PROGRESS NOTES
Endocrinology Clinic Progress Note  PCP: Shabbir Moon D.O.    CC: Diabetes    HPI:  Av Bob is a 71 y.o. old patient who comes in today for routine follow up. He was recently diagnosed with lymphoma and had CVA in December 2016. Currently on chemotherapy.    Type 2 diabetes: He is currently on Toujeo 15 units at bedtime and Humalog 8-20 units with meals. Also on metformin 500 mg twice a day. Reports compliance with Toujeo. He checks blood sugars 2-3 times a day. Did not bring in blood sugar log. Glycemic control has improved in the last few months. Last eye exam was in August 2016. He has some numbness and tingling in feet.    Hypertension: Blood pressure is well controlled. He is on losartan in addition to other antihypertensive agents, tolerating well.    Hyperlipidemia: He is on Lipitor, tolerating well.    ROS:  Constitutional: No unintentional weight loss  Neuro: Positive for numbness and tingling in feet    Past Medical History:  Patient Active Problem List    Diagnosis Date Noted   • Essential hypertension, benign 03/22/2017   • History of arterial ischemic stroke 03/22/2017   • Lymphoma (CMS-HCC) 02/19/2017   • Type 2 diabetes mellitus treated with insulin (CMS-HCC) 12/30/2016   • DM (diabetes mellitus) type II controlled, neurological manifestation (CMS-HCC) 08/25/2016   • CKD (chronic kidney disease) stage 3, GFR 30-59 ml/min 08/25/2016   • Old myocardial infarction 05/07/2014   • Coronary atherosclerosis of native coronary artery    • Controlled gout 01/28/2014   • Diabetic polyneuropathy (CMS-HCC) 09/11/2013   • Encounter for long-term (current) use of insulin (CMS-HCC) 09/11/2013   • Hypokalemia 12/20/2012   • Presence of drug coated stent in LAD coronary artery 12/13/2011   • Presence of drug coated stent in left circumflex coronary artery 12/13/2011   • S/P PTCA (percutaneous transluminal coronary angioplasty), RCA, 5/1997, patent 7/10/2009 12/13/2011   • Mixed hyperlipidemia  12/13/2011   • Benign hypertensive heart disease without heart failure 12/13/2011       Medications:  Outpatient Prescriptions Prior to Visit   Medication Sig Dispense Refill   • acyclovir (ZOVIRAX) 400 MG tablet Take 400 mg by mouth 2 times a day.     • ascorbic acid (VITAMIN C) 500 MG/5ML syrup Take  by mouth every day.     • Magnesium Oxide 400 MG Cap Take  by mouth.     • omeprazole (PRILOSEC) 20 MG delayed-release capsule Take 20 mg by mouth every day.     • spironolactone (ALDACTONE) 50 MG Tab Take 50 mg by mouth every day.     • amlodipine (NORVASC) 5 MG Tab Take 1 Tab by mouth every day. 90 Tab 3   • atorvastatin (LIPITOR) 40 MG Tab Take 0.5 Tabs by mouth every day. 90 Tab 3   • Insulin Glargine (TOUJEO SOLOSTAR SC) Inject 10-20 Units as instructed.     • Omega-3 Fatty Acids (OMEGA-3 FISH OIL PO) Take  by mouth.     • metformin ER (GLUCOPHAGE XR) 500 MG TABLET SR 24 HR Take 1 Tab by mouth 2 times a day. 60 Tab 5   • clopidogrel (PLAVIX) 75 MG Tab Take 1 Tab by mouth every day. 90 Tab 3   • valsartan-hydrochlorothiazide (DIOVAN-HCT) 320-25 MG per tablet TAKE 1 TABLET DAILY 90 Tab 3   • labetalol (NORMODYNE) 100 MG Tab TAKE 1 TABLET TWICE A  Tab 3   • potassium chloride SA (KLOR-CON M20) 20 MEQ Tab CR TAKE 2 TABLETS THREE TIMES A  Tab 3   • allopurinol (ZYLOPRIM) 300 MG Tab Take 1 Tab by mouth every day. 90 Tab 3   • fenofibrate (TRICOR) 145 MG Tab Take 1 Tab by mouth every day. 90 Tab 3   • nitroglycerin (NITROSTAT) 0.4 MG SL Tab Place 1 Tab under tongue as needed for Chest Pain. 25 Tab 6   • CENTRUM SILVER PO QDAY.      • nystatin (MYCOSTATIN) Powder Apply  to affected area(s) 3 times a day.     • predniSONE (DELTASONE) 10 MG Tab Take 10 mg by mouth every day.     • loratadine (CLARITIN) 10 MG Tab Take 10 mg by mouth every day.     • sulfamethoxazole-trimethoprim (BACTRIM DS) 800-160 MG tablet Take 1 Tab by mouth 2 times a day.     • insulin lispro, Human, (HUMALOG KWIKPEN) 100 UNIT/ML Solution  Pen-injector injection Inject 35 Units as instructed 3 times a day before meals. 30 mL 6   • aspirin (ASA) 81 MG Chew Tab chewable tablet Take 1 Tab by mouth every day. 100 Tab 11   • insulin glargine (LANTUS SOLOSTAR) 100 UNIT/ML Solution Pen-injector injection Inject 72 Units as instructed every evening. 90 mL 2   • B-D UF III MINI PEN NEEDLES 31G X 5 MM Misc USE AS DIRECTED WITH INSULIN INJECTIONS 450 Each 2   • Cyanocobalamin (VITAMIN B-12) 1000 MCG SUBL Place  under tongue every Sunday.       No facility-administered medications prior to visit.       Labs:  Lab Results   Component Value Date/Time    GLYCOHEMOGLOBIN 9.4 03/23/2017 08:46 AM      Lab Results   Component Value Date/Time    MICRO ALB CREAT RATIO 58* 05/21/2015 10:05 AM    MICROALBUMIN, URINE RANDOM 7.1 05/21/2015 10:05 AM      Lab Results   Component Value Date/Time    CHOLESTEROL, 08/19/2016 10:05 AM    LDL 84 08/19/2016 10:05 AM    HDL 35* 08/19/2016 10:05 AM    TRIGLYCERIDES 121 08/19/2016 10:05 AM       Lab Results   Component Value Date/Time    SODIUM 139 08/19/2016 10:05 AM    POTASSIUM 3.9 08/19/2016 10:05 AM    CHLORIDE 102 08/19/2016 10:05 AM    CO2 28 08/19/2016 10:05 AM    GLUCOSE 177* 08/19/2016 10:05 AM    BUN 20 08/19/2016 10:05 AM    CREATININE 1.54* 08/19/2016 10:05 AM     Lab Results   Component Value Date/Time    ALKALINE PHOSPHATASE 58 08/19/2016 10:05 AM    AST(SGOT) 23 08/19/2016 10:05 AM    ALT(SGPT) 30 08/19/2016 10:05 AM    TOTAL BILIRUBIN 1.0 08/19/2016 10:05 AM      No results found for: TSH  Lab Results   Component Value Date/Time    FREE T-4 1.09 04/21/2011 02:45 PM       Physical Examination:  Vital signs: /84 mmHg  Pulse 94  Ht 1.829 m (6')  Wt 98.703 kg (217 lb 9.6 oz)  BMI 29.51 kg/m2  SpO2 96%  General: No apparent distress, cooperative  Eyes: No scleral icterus, no discharge   Resp: Normal effort, clear to auscultation bilaterally  CVS: Regular rate and rhythm, S1 S2 normal, no murmur  Psych: Alert  and oriented, normal mood and affect    Assessment and Plan:    DM (diabetes mellitus) type 2 with hyperglycemia and diabetic neuropathy  · Hemoglobin A1c today in the clinic is 9.4%, improved from 11.9% at last clinic visit  · Goal hemoglobin A1c less than 7%  · Continue Toujeo 15 units at bedtime and Humalog 8-20 units 3 times a day with meals, and metformin 500 mg twice a day    Hypertension  · Blood pressure is well controlled  · Continue losartan, in addition to other antihypertensive agent    Hyperlipidemia  · LDL 84  · Continue Lipitor and TriCor    Return in about 3 months (around 6/23/2017).    Thank you for allowing me to participate in the care of this patient.    Trinidad Maguire M.D.  09/07/2016    CC:   Shabbir Moon D.O.

## 2017-03-28 LAB
BUN SERPL-MCNC: 11 MG/DL (ref 8–27)
BUN/CREAT SERPL: 10 (ref 10–22)
CHLORIDE SERPL-SCNC: 103 MMOL/L (ref 96–106)
CO2 SERPL-SCNC: 19 MMOL/L (ref 18–29)
CREAT SERPL-MCNC: 1.11 MG/DL (ref 0.76–1.27)
GLUCOSE SERPL-MCNC: 180 MG/DL (ref 65–99)
POTASSIUM SERPL-SCNC: 4.8 MMOL/L (ref 3.5–5.2)
SODIUM SERPL-SCNC: 139 MMOL/L (ref 134–144)

## 2017-04-05 ENCOUNTER — TELEPHONE (OUTPATIENT)
Dept: CARDIOLOGY | Facility: MEDICAL CENTER | Age: 72
End: 2017-04-05

## 2017-04-07 DIAGNOSIS — I11.9 BENIGN HYPERTENSIVE HEART DISEASE WITHOUT HEART FAILURE: ICD-10-CM

## 2017-04-07 NOTE — TELEPHONE ENCOUNTER
Kidney function was better. I think he can cut the potassium down to 1 tab 3 times daily and he may have already done it.  Please check with him and see.  If he has been taking to 3 times daily then we should cut a 213 times daily and check a BMP again in about 3 weeks. I ordered that.

## 2017-04-11 DIAGNOSIS — I11.9 BENIGN HYPERTENSIVE HEART DISEASE WITHOUT HEART FAILURE: ICD-10-CM

## 2017-04-26 ENCOUNTER — OFFICE VISIT (OUTPATIENT)
Dept: CARDIOLOGY | Facility: MEDICAL CENTER | Age: 72
End: 2017-04-26
Payer: MEDICARE

## 2017-04-26 VITALS
WEIGHT: 223 LBS | HEART RATE: 88 BPM | BODY MASS INDEX: 30.2 KG/M2 | OXYGEN SATURATION: 97 % | SYSTOLIC BLOOD PRESSURE: 140 MMHG | HEIGHT: 72 IN | DIASTOLIC BLOOD PRESSURE: 60 MMHG

## 2017-04-26 DIAGNOSIS — Z86.73 HISTORY OF ARTERIAL ISCHEMIC STROKE: ICD-10-CM

## 2017-04-26 DIAGNOSIS — Z95.5 PRESENCE OF DRUG COATED STENT IN LEFT CIRCUMFLEX CORONARY ARTERY: ICD-10-CM

## 2017-04-26 DIAGNOSIS — I25.10 ATHEROSCLEROSIS OF NATIVE CORONARY ARTERY OF NATIVE HEART WITHOUT ANGINA PECTORIS: ICD-10-CM

## 2017-04-26 DIAGNOSIS — Z95.5 PRESENCE OF DRUG COATED STENT IN LAD CORONARY ARTERY: ICD-10-CM

## 2017-04-26 DIAGNOSIS — I11.9 BENIGN HYPERTENSIVE HEART DISEASE WITHOUT HEART FAILURE: ICD-10-CM

## 2017-04-26 DIAGNOSIS — Z98.61 S/P PTCA (PERCUTANEOUS TRANSLUMINAL CORONARY ANGIOPLASTY): ICD-10-CM

## 2017-04-26 PROCEDURE — 99213 OFFICE O/P EST LOW 20 MIN: CPT | Performed by: INTERNAL MEDICINE

## 2017-04-26 PROCEDURE — G8432 DEP SCR NOT DOC, RNG: HCPCS | Performed by: INTERNAL MEDICINE

## 2017-04-26 PROCEDURE — G8598 ASA/ANTIPLAT THER USED: HCPCS | Performed by: INTERNAL MEDICINE

## 2017-04-26 PROCEDURE — G8417 CALC BMI ABV UP PARAM F/U: HCPCS | Performed by: INTERNAL MEDICINE

## 2017-04-26 PROCEDURE — 4040F PNEUMOC VAC/ADMIN/RCVD: CPT | Mod: 8P | Performed by: INTERNAL MEDICINE

## 2017-04-26 PROCEDURE — 1101F PT FALLS ASSESS-DOCD LE1/YR: CPT | Mod: 8P | Performed by: INTERNAL MEDICINE

## 2017-04-26 PROCEDURE — 3017F COLORECTAL CA SCREEN DOC REV: CPT | Mod: 8P | Performed by: INTERNAL MEDICINE

## 2017-04-26 PROCEDURE — 1036F TOBACCO NON-USER: CPT | Performed by: INTERNAL MEDICINE

## 2017-04-26 RX ORDER — POTASSIUM CHLORIDE 20 MEQ/1
TABLET, EXTENDED RELEASE ORAL
Qty: 540 TAB | Refills: 3
Start: 2017-04-26 | End: 2017-10-11

## 2017-04-26 RX ORDER — PREDNISONE 20 MG/1
TABLET ORAL
Refills: 0 | COMMUNITY
Start: 2017-04-22 | End: 2017-09-29

## 2017-04-26 RX ORDER — POTASSIUM CHLORIDE 20 MEQ/1
60 TABLET, EXTENDED RELEASE ORAL
COMMUNITY
End: 2017-09-29

## 2017-04-26 ASSESSMENT — ENCOUNTER SYMPTOMS
MYALGIAS: 0
COUGH: 0
WHEEZING: 0
PND: 0
FEVER: 0
ORTHOPNEA: 0
FALLS: 0
CHILLS: 0

## 2017-04-26 ASSESSMENT — LIFESTYLE VARIABLES: SUBSTANCE_ABUSE: 0

## 2017-04-26 NOTE — Clinical Note
[facility]   [address]   [address]  [place] [address]  Phone: [phone]  Fax: [phone]              Av Bob  1945    Encounter Date: 4/26/2017    Larry Patterson M.D.          PROGRESS NOTE:  Subjective:   Av Bob is a 72 y.o. male who presents today for follow-up of coronary disease.  No angina, palpitation or dyspnea. Chemotherapy is going well. No bleeding complications so far. No recurrent neurological events.    Past Medical History   Diagnosis Date   • Hypertension    • Mixed hyperlipidemia    • Nephrolithiasis 2006   • Hypokalemia 2012   • Wound of left leg 2012     Requiring surgery and debridment, Dr. Moore   • Type II or unspecified type diabetes mellitus with neurological manifestations, uncontrolled 9/11/2013   • Polyneuropathy in diabetes(357.2) 9/11/2013   • Coronary atherosclerosis of native coronary artery    • S/P PTCA (percutaneous transluminal coronary angioplasty), RCA, 5/1997, patent 7/10/2009    • Presence of drug coated stent in left circumflex coronary artery      Overlapping 2.5mm Taxus and Milton stents in OM, 7/10/2009    • Presence of drug coated stent in LAD coronary artery      Overlapping [place] 3.0 stents, 7/10/2009    • Controlled gout 2014   • CKD (chronic kidney disease) stage 3, GFR 30-59 ml/min    • Skin ulcer of calf (CMS-HCC) 2015     Dr. Terry and wound care   • Leg wound, right 9/2016     Followed in wound care   • Cerebrovascular accident (CVA) (CMS-HCC) 12/30/2016     Left hemiparesis, etiology of stroke not established, lymphoma discovered on MRI evaluation of stroke   • Lymphoma (CMS-HCC) 2/19/2017     Large cell     Past Surgical History   Procedure Laterality Date   • Tonsillectomy and adenoidectomy     • Lithotripsy     • Angioplasty  1997     RCA followed by other stents as noted above.    • Wound closure general  4/3/2012     Performed by  GERHARD GILBERT at SURGERY SAME DAY [facility] ORS   • Tonsillectomy and adenoidectomy     • Cataract extraction with iol       bilateral     Family History   Problem Relation Age of Onset   • Heart Disease Father      CAD   • Diabetes Father    • Cancer Mother    • Kidney stones Brother    • Heart Disease Brother      History   Smoking status   • Never Smoker    Smokeless tobacco   • Never Used     Allergies   Allergen Reactions   • Lorazepam Unspecified     Disorientation   • Nkda [No Known Drug Allergy]      Outpatient Encounter Prescriptions as of 4/26/2017   Medication Sig Dispense Refill   • potassium chloride SA (KLOR-CON M20) 20 MEQ Tab CR TAKE 1 TABLETS THREE TIMES A  Tab 3   • insulin lispro, Human, (HUMALOG) 100 UNIT/ML Solution Pen-injector injection Using up to 50 units per day as directed. 45 mL 2   • aspirin (ASA) 325 MG Tab Take 325 mg by mouth every 6 hours as needed.     • nystatin (MYCOSTATIN) Powder Apply  to affected area(s) 3 times a day.     • predniSONE (DELTASONE) 10 MG Tab Take 10 mg by mouth every day.     • acyclovir (ZOVIRAX) 400 MG tablet Take 400 mg by mouth 2 times a day.     • ascorbic acid (VITAMIN C) 500 MG/5ML syrup Take  by mouth every day.     • loratadine (CLARITIN) 10 MG Tab Take 10 mg by mouth every day.     • Magnesium Oxide 400 MG Cap Take  by mouth.     • spironolactone (ALDACTONE) 50 MG Tab Take 50 mg by mouth every day.     • sulfamethoxazole-trimethoprim (BACTRIM DS) 800-160 MG tablet Take 1 Tab by mouth 2 times a day.     • amlodipine (NORVASC) 5 MG Tab Take 1 Tab by mouth every day. 90 Tab 3   • atorvastatin (LIPITOR) 40 MG Tab Take 0.5 Tabs by mouth every day. 90 Tab 3   • Insulin Glargine (TOUJEO SOLOSTAR SC) Inject 10-20 Units as instructed.     • Omega-3 Fatty Acids (OMEGA-3 FISH OIL PO) Take  by mouth.     • metformin ER (GLUCOPHAGE XR) 500 MG TABLET SR 24 HR Take 1 Tab by mouth 2 times a day. 60 Tab 5   • insulin glargine (LANTUS SOLOSTAR) 100 UNIT/ML  Solution Pen-injector injection Inject 72 Units as instructed every evening. 90 mL 2   • clopidogrel (PLAVIX) 75 MG Tab Take 1 Tab by mouth every day. 90 Tab 3   • valsartan-hydrochlorothiazide (DIOVAN-HCT) 320-25 MG per tablet TAKE 1 TABLET DAILY 90 Tab 3   • labetalol (NORMODYNE) 100 MG Tab TAKE 1 TABLET TWICE A  Tab 3   • allopurinol (ZYLOPRIM) 300 MG Tab Take 1 Tab by mouth every day. 90 Tab 3   • fenofibrate (TRICOR) 145 MG Tab Take 1 Tab by mouth every day. 90 Tab 3   • nitroglycerin (NITROSTAT) 0.4 MG SL Tab Place 1 Tab under tongue as needed for Chest Pain. 25 Tab 6   • B-D UF III MINI PEN NEEDLES 31G X 5 MM Misc USE AS DIRECTED WITH INSULIN INJECTIONS 450 Each 2   • CENTRUM SILVER PO QDAY.      • potassium chloride SA (KDUR) 20 MEQ Tab CR Take 60 mEq by mouth.     • predniSONE (DELTASONE) 20 MG Tab   0   • omeprazole (PRILOSEC) 20 MG delayed-release capsule Take 20 mg by mouth every day.     • [DISCONTINUED] potassium chloride SA (KLOR-CON M20) 20 MEQ Tab CR TAKE 2 TABLETS THREE TIMES A  Tab 3   • Cyanocobalamin (VITAMIN B-12) 1000 MCG SUBL Place  under tongue every Sunday.       No facility-administered encounter medications on file as of 4/26/2017.     Review of Systems   Constitutional: Negative for fever and chills.   Respiratory: Negative for cough and wheezing.    Cardiovascular: Negative for orthopnea and PND.   Musculoskeletal: Negative for myalgias and falls.   Psychiatric/Behavioral: Negative for substance abuse.        Objective:   /60 mmHg  Pulse 88  Ht 1.829 m (6')  Wt 101.152 kg (223 lb)  BMI 30.24 kg/m2  SpO2 97%    Physical Exam   Constitutional: He is oriented to person, place, and time. He appears well-developed and well-nourished.   Eyes: Conjunctivae are normal. No scleral icterus.   Neck: No JVD present.   Cardiovascular: Normal rate, regular rhythm, normal heart sounds and intact distal pulses.  Exam reveals no gallop.    No murmur heard.  Pulmonary/Chest:  Effort normal and breath sounds normal.   Musculoskeletal: He exhibits no edema.   Neurological: He is alert and oriented to person, place, and time.   Skin: Skin is warm and dry.   Psychiatric: He has a normal mood and affect. Thought content normal.       Assessment:     1. Atherosclerosis of native coronary artery of native heart without angina pectoris     2. S/P PTCA (percutaneous transluminal coronary angioplasty), RCA, 5/1997, patent 7/10/2009     3. Presence of drug coated stent in left circumflex coronary artery     4. Presence of drug coated stent in LAD coronary artery     5. Benign hypertensive heart disease without heart failure  potassium chloride SA (KLOR-CON M20) 20 MEQ Tab CR   6. History of arterial ischemic stroke       The above assessed cardiovascular problems are clinically stable.  The question remains how long to continue dual antiplatelet therapy. It did not protect him from the neurological event that led to the current lymphoma diagnosis and etiology of that neurological event remains unexplained. He had been on extended dual antiplatelet therapy on the recommendation of his [place] cardiologist who is no longer there and I have not interrupted it so far but would stop promptly if an indication for anticoagulation emerged or any bleeding problems. He is going to continue follow-up with Dr. Rivera who is coordinating his cardio-oncology follow-up including echocardiograms at [facility].    Medical Decision Making:  Today's Assessment / Status / Plan:     He will get his next echocardiogram at [facility] as directed by Dr. Rivera. I made no change in his regimen today. His tendency to spontaneous kaliuresis has required the unusual combination of medications noted above and close surveillance of renal function and potassium which he also gets at [place].  I will be seeing him very shortly after his June chemotherapy which should be his last cycle.      No Recipients

## 2017-04-26 NOTE — PROGRESS NOTES
Subjective:   Av Bob is a 72 y.o. male who presents today for follow-up of coronary disease.  No angina, palpitation or dyspnea. Chemotherapy is going well. No bleeding complications so far. No recurrent neurological events.    Past Medical History   Diagnosis Date   • Hypertension    • Mixed hyperlipidemia    • Nephrolithiasis 2006   • Hypokalemia 2012   • Wound of left leg 2012     Requiring surgery and debridment, Dr. Moore   • Type II or unspecified type diabetes mellitus with neurological manifestations, uncontrolled 9/11/2013   • Polyneuropathy in diabetes(357.2) 9/11/2013   • Coronary atherosclerosis of native coronary artery    • S/P PTCA (percutaneous transluminal coronary angioplasty), RCA, 5/1997, patent 7/10/2009    • Presence of drug coated stent in left circumflex coronary artery      Overlapping 2.5mm Taxus and [place] stents in OM, 7/10/2009    • Presence of drug coated stent in LAD coronary artery      Overlapping [place] 3.0 stents, 7/10/2009    • Controlled gout 2014   • CKD (chronic kidney disease) stage 3, GFR 30-59 ml/min    • Skin ulcer of calf (CMS-HCC) 2015     Dr. Terry and wound care   • Leg wound, right 9/2016     Followed in wound care   • Cerebrovascular accident (CVA) (CMS-HCC) 12/30/2016     Left hemiparesis, etiology of stroke not established, lymphoma discovered on MRI evaluation of stroke   • Lymphoma (CMS-HCC) 2/19/2017     Large cell     Past Surgical History   Procedure Laterality Date   • Tonsillectomy and adenoidectomy     • Lithotripsy     • Angioplasty  1997     RCA followed by other stents as noted above.    • Wound closure general  4/3/2012     Performed by GERHARD MOORE at SURGERY SAME DAY [facility] ORS   • Tonsillectomy and adenoidectomy     • Cataract extraction with iol       bilateral     Family History   Problem Relation Age of Onset   • Heart Disease Father      CAD   • Diabetes Father    • Cancer Mother    • Kidney stones Brother    •  Heart Disease Brother      History   Smoking status   • Never Smoker    Smokeless tobacco   • Never Used     Allergies   Allergen Reactions   • Lorazepam Unspecified     Disorientation   • Nkda [No Known Drug Allergy]      Outpatient Encounter Prescriptions as of 4/26/2017   Medication Sig Dispense Refill   • potassium chloride SA (KLOR-CON M20) 20 MEQ Tab CR TAKE 1 TABLETS THREE TIMES A  Tab 3   • insulin lispro, Human, (HUMALOG) 100 UNIT/ML Solution Pen-injector injection Using up to 50 units per day as directed. 45 mL 2   • aspirin (ASA) 325 MG Tab Take 325 mg by mouth every 6 hours as needed.     • nystatin (MYCOSTATIN) Powder Apply  to affected area(s) 3 times a day.     • predniSONE (DELTASONE) 10 MG Tab Take 10 mg by mouth every day.     • acyclovir (ZOVIRAX) 400 MG tablet Take 400 mg by mouth 2 times a day.     • ascorbic acid (VITAMIN C) 500 MG/5ML syrup Take  by mouth every day.     • loratadine (CLARITIN) 10 MG Tab Take 10 mg by mouth every day.     • Magnesium Oxide 400 MG Cap Take  by mouth.     • spironolactone (ALDACTONE) 50 MG Tab Take 50 mg by mouth every day.     • sulfamethoxazole-trimethoprim (BACTRIM DS) 800-160 MG tablet Take 1 Tab by mouth 2 times a day.     • amlodipine (NORVASC) 5 MG Tab Take 1 Tab by mouth every day. 90 Tab 3   • atorvastatin (LIPITOR) 40 MG Tab Take 0.5 Tabs by mouth every day. 90 Tab 3   • Insulin Glargine (TOUJEO SOLOSTAR SC) Inject 10-20 Units as instructed.     • Omega-3 Fatty Acids (OMEGA-3 FISH OIL PO) Take  by mouth.     • metformin ER (GLUCOPHAGE XR) 500 MG TABLET SR 24 HR Take 1 Tab by mouth 2 times a day. 60 Tab 5   • insulin glargine (LANTUS SOLOSTAR) 100 UNIT/ML Solution Pen-injector injection Inject 72 Units as instructed every evening. 90 mL 2   • clopidogrel (PLAVIX) 75 MG Tab Take 1 Tab by mouth every day. 90 Tab 3   • valsartan-hydrochlorothiazide (DIOVAN-HCT) 320-25 MG per tablet TAKE 1 TABLET DAILY 90 Tab 3   • labetalol (NORMODYNE) 100 MG Tab  TAKE 1 TABLET TWICE A  Tab 3   • allopurinol (ZYLOPRIM) 300 MG Tab Take 1 Tab by mouth every day. 90 Tab 3   • fenofibrate (TRICOR) 145 MG Tab Take 1 Tab by mouth every day. 90 Tab 3   • nitroglycerin (NITROSTAT) 0.4 MG SL Tab Place 1 Tab under tongue as needed for Chest Pain. 25 Tab 6   • B-D UF III MINI PEN NEEDLES 31G X 5 MM Misc USE AS DIRECTED WITH INSULIN INJECTIONS 450 Each 2   • CENTRUM SILVER PO QDAY.      • potassium chloride SA (KDUR) 20 MEQ Tab CR Take 60 mEq by mouth.     • predniSONE (DELTASONE) 20 MG Tab   0   • omeprazole (PRILOSEC) 20 MG delayed-release capsule Take 20 mg by mouth every day.     • [DISCONTINUED] potassium chloride SA (KLOR-CON M20) 20 MEQ Tab CR TAKE 2 TABLETS THREE TIMES A  Tab 3   • Cyanocobalamin (VITAMIN B-12) 1000 MCG SUBL Place  under tongue every Sunday.       No facility-administered encounter medications on file as of 4/26/2017.     Review of Systems   Constitutional: Negative for fever and chills.   Respiratory: Negative for cough and wheezing.    Cardiovascular: Negative for orthopnea and PND.   Musculoskeletal: Negative for myalgias and falls.   Psychiatric/Behavioral: Negative for substance abuse.        Objective:   /60 mmHg  Pulse 88  Ht 1.829 m (6')  Wt 101.152 kg (223 lb)  BMI 30.24 kg/m2  SpO2 97%    Physical Exam   Constitutional: He is oriented to person, place, and time. He appears well-developed and well-nourished.   Eyes: Conjunctivae are normal. No scleral icterus.   Neck: No JVD present.   Cardiovascular: Normal rate, regular rhythm, normal heart sounds and intact distal pulses.  Exam reveals no gallop.    No murmur heard.  Pulmonary/Chest: Effort normal and breath sounds normal.   Musculoskeletal: He exhibits no edema.   Neurological: He is alert and oriented to person, place, and time.   Skin: Skin is warm and dry.   Psychiatric: He has a normal mood and affect. Thought content normal.       Assessment:     1. Atherosclerosis of  native coronary artery of native heart without angina pectoris     2. S/P PTCA (percutaneous transluminal coronary angioplasty), RCA, 5/1997, patent 7/10/2009     3. Presence of drug coated stent in left circumflex coronary artery     4. Presence of drug coated stent in LAD coronary artery     5. Benign hypertensive heart disease without heart failure  potassium chloride SA (KLOR-CON M20) 20 MEQ Tab CR   6. History of arterial ischemic stroke       The above assessed cardiovascular problems are clinically stable.  The question remains how long to continue dual antiplatelet therapy. It did not protect him from the neurological event that led to the current lymphoma diagnosis and etiology of that neurological event remains unexplained. He had been on extended dual antiplatelet therapy on the recommendation of his [place] cardiologist who is no longer there and I have not interrupted it so far but would stop promptly if an indication for anticoagulation emerged or any bleeding problems. He is going to continue follow-up with Dr. Rivera who is coordinating his cardio-oncology follow-up including echocardiograms at [facility].    Medical Decision Making:  Today's Assessment / Status / Plan:     He will get his next echocardiogram at [facility] as directed by Dr. Rivera. I made no change in his regimen today. His tendency to spontaneous kaliuresis has required the unusual combination of medications noted above and close surveillance of renal function and potassium which he also gets at [place].  I will be seeing him very shortly after his June chemotherapy which should be his last cycle.

## 2017-04-26 NOTE — MR AVS SNAPSHOT
Av Landakp   2017 11:40 AM   Office Visit   MRN: [id number]    Department:  Heart [facility]   Dept Phone:  [phone]    Description:  Male : 1945   Provider:  Larry Patterson M.D.           Allergies as of 2017     Allergen Noted Reactions    Lorazepam 2017   Unspecified    Disorientation    Nkda [No Known Drug Allergy] 2008         You were diagnosed with     Benign hypertensive heart disease without heart failure   [402.10.ICD-9-CM]         Vital Signs     Blood Pressure Pulse Height Weight Body Mass Index Oxygen Saturation    140/60 mmHg 88 1.829 m (6') 101.152 kg (223 lb) 30.24 kg/m2 97%    Smoking Status                   Never Smoker            Basic Information     Date Of Birth Sex Race Ethnicity Preferred Language    1945 Male White Non- English      Your appointments     2017  9:00 AM   PT New Evaluation 60 Minutes with REZA Medina [facility] ([address])    [address] [place] [address]   [phone]           Please bring Photo ID, Insurance Cards, list of all Medication and copies of any legal documents (such as Living Will, Power of ) If speaking a language besides English please bring an adult . Please arrive 30 minutes prior for check in and registration.            May 08, 2017  2:30 PM   PT Follow Up 60 Minutes with REZA Medina [facility] ([address])    [address] [place] [id number]   [phone]            May 11, 2017  2:30 PM   PT Follow Up 60 Minutes with REZA Medina [facility] ([address])    [address] [place] [address]   [phone]            May 16, 2017  2:30 PM   PT Follow Up 60 Minutes with REZA Medina [facility] ([address])    [address] [place] [address]  [address]  [place] [address]   [phone]            May 18, 2017  2:30 PM   PT Follow Up 60 Minutes with Debby Giles P.T.   [facility] ([address])    [address] [place] [address].  [address]  [place] [address]   [phone]            May 23, 2017  2:30 PM   PT Follow Up 60 Minutes with Debby Giles P.T.   [facility] ([address])    [address] [place] [address]  [address]  [place] [address]   [phone]            May 25, 2017  2:00 PM   PT Follow Up 60 Minutes with Debby Giles P.T.   [facility] ([address])    [address] [place] [address]  [address]  [place] [address]   [phone]            Jun 07, 2017 11:00 AM   FOLLOW UP with Larry Patterson M.D.   [facility] (--)    [address]  [address]  [place] [address]   [phone]            Jun 21, 2017  9:00 AM   FOLLOW UP with Larry Patterson M.D.   [facility] (--)    [address]  [address]  [place] [address]   [phone]            Jun 28, 2017 11:20 AM   Diabetes Care Visit with Trinidad Maguire M.D., Blanca Rojas R.N.   [facility] ([facility])    [address]  [place] [address]   [phone]           You will be receiving a confirmation call a few days before your appointment from our automated call confirmation system.              Problem List              ICD-10-CM Priority Class Noted - Resolved    Presence of drug coated stent in LAD coronary artery Z95.5   12/13/2011 - Present    Presence of drug coated stent in left circumflex coronary artery Z95.5   12/13/2011 - Present    S/P PTCA (percutaneous transluminal coronary angioplasty), RCA, 5/1997, patent 7/10/2009 Z98.61   12/13/2011 - Present    Mixed hyperlipidemia E78.2   12/13/2011 - Present    Benign hypertensive heart disease  without heart failure I11.9   12/13/2011 - Present    Hypokalemia E87.6   12/20/2012 - Present    Diabetic polyneuropathy (CMS-HCC) E11.42   9/11/2013 - Present    Encounter for long-term (current) use of insulin (CMS-HCC) Z79.4   9/11/2013 - Present    Controlled gout M10.9   1/28/2014 - Present    Coronary atherosclerosis of native coronary artery I25.10   Unknown - Present    Old myocardial infarction I25.2   5/7/2014 - Present    DM (diabetes mellitus) type II controlled, neurological manifestation (CMS-HCC) E11.49   8/25/2016 - Present    CKD (chronic kidney disease) stage 3, GFR 30-59 ml/min N18.3   8/25/2016 - Present    Lymphoma (CMS-HCC) C85.90   2/19/2017 - Present    Type 2 diabetes mellitus treated with insulin (CMS-HCC) E11.9, Z79.4   12/30/2016 - Present    Essential hypertension, benign I10   3/22/2017 - Present    History of arterial ischemic stroke Z86.73   3/22/2017 - Present      Health Maintenance        Date Due Completion Dates    IMM DTaP/Tdap/Td Vaccine (1 - Tdap) 3/30/1966 ---    COLONOSCOPY 3/30/1997 ---    IMM ZOSTER VACCINE 3/30/2007 ---    IMM PNEUMOCOCCAL 65+ (ADULT) HIGH/HIGHEST RISK SERIES (1 of 2 - PCV13) 3/30/2012 ---    URINE ACR / MICROALBUMIN 5/21/2016 5/21/2015, 1/17/2014, 12/17/2012, 11/29/2011, 12/27/2010    RETINAL SCREENING 8/17/2017 8/17/2016, 8/17/2016 (Done), 1/29/2014, 9/4/2013    Override on 8/17/2016: Done    FASTING LIPID PROFILE 8/19/2017 8/19/2016, 1/9/2016, 5/21/2015, 6/17/2014, 1/17/2014, 4/17/2013, 12/17/2012, 11/29/2011, 12/27/2010    DIABETES MONOFILAMENT / LE EXAM 9/7/2017 9/7/2016, 11/23/2015 (N/S), 5/22/2015 (Done), 3/26/2014, 9/11/2013 (Done)    Override on 11/23/2015: (N/S)    Override on 5/22/2015: Done    Override on 9/11/2013: Done    A1C SCREENING 9/23/2017 3/23/2017, 12/14/2016, 8/19/2016, 1/9/2016, 11/23/2015, 5/21/2015, 6/17/2014, 3/26/2014, 1/17/2014, 9/11/2013, 9/11/2013 (Done), 7/10/2012, 11/29/2011, 12/27/2010, 11/5/2006    Override on  9/11/2013: Done (11.0)    SERUM CREATININE 3/27/2018 3/27/2017, 8/19/2016, 1/9/2016, 5/21/2015, 6/17/2014, 1/17/2014, 6/18/2013, 12/17/2012, 12/17/2012, 7/10/2012, 3/21/2012, 11/29/2011, 12/27/2010, 5/28/2008, 11/8/2006, 11/7/2006, 11/6/2006, 11/6/2006, 11/5/2006, 11/4/2006, 11/3/2006            Current Immunizations     No immunizations on file.      Below and/or attached are the medications your provider expects you to take. Review all of your home medications and newly ordered medications with your provider and/or pharmacist. Follow medication instructions as directed by your provider and/or pharmacist. Please keep your medication list with you and share with your provider. Update the information when medications are discontinued, doses are changed, or new medications (including over-the-counter products) are added; and carry medication information at all times in the event of emergency situations     Allergies:  LORAZEPAM - Unspecified     NKDA - (reactions not documented)               Medications  Valid as of: April 26, 2017 - 12:45 PM    Generic Name Brand Name Tablet Size Instructions for use    Acyclovir (Tab) ZOVIRAX 400 MG Take 400 mg by mouth 2 times a day.        Allopurinol (Tab) ZYLOPRIM 300 MG Take 1 Tab by mouth every day.        AmLODIPine Besylate (Tab) NORVASC 5 MG Take 1 Tab by mouth every day.        Ascorbic Acid (Syrup) VITAMIN C 500 MG/5ML Take  by mouth every day.        Aspirin (Tab)  MG Take 325 mg by mouth every 6 hours as needed.        Atorvastatin Calcium (Tab) LIPITOR 40 MG Take 0.5 Tabs by mouth every day.        Clopidogrel Bisulfate (Tab) PLAVIX 75 MG Take 1 Tab by mouth every day.        Cyanocobalamin (SL Tab) Vitamin B-12 1000 MCG Place  under tongue every Sunday.        Fenofibrate (Tab) TRICOR 145 MG Take 1 Tab by mouth every day.        Insulin Glargine (Solution Pen-injector) LANTUS 100 UNIT/ML Inject 72 Units as instructed every evening.        Insulin Glargine    Inject 10-20 Units as instructed.        Insulin Lispro (Solution Pen-injector) HUMALOG 100 UNIT/ML Using up to 50 units per day as directed.        Insulin Pen Needle (Misc) B-D UF III MINI PEN NEEDLES 31G X 5 MM USE AS DIRECTED WITH INSULIN INJECTIONS        Labetalol HCl (Tab) NORMODYNE 100 MG TAKE 1 TABLET TWICE A DAY        Loratadine (Tab) CLARITIN 10 MG Take 10 mg by mouth every day.        Magnesium Oxide (Cap) Magnesium Oxide 400 MG Take  by mouth.        MetFORMIN HCl (TABLET SR 24 HR) GLUCOPHAGE  MG Take 1 Tab by mouth 2 times a day.        Multiple Vitamins-Minerals   QDAY.         Nitroglycerin (SL Tab) NITROSTAT 0.4 MG Place 1 Tab under tongue as needed for Chest Pain.        Nystatin (Powder) MYCOSTATIN  Apply  to affected area(s) 3 times a day.        Omega-3 Fatty Acids   Take  by mouth.        Omeprazole (CAPSULE DELAYED RELEASE) PRILOSEC 20 MG Take 20 mg by mouth every day.        Potassium Chloride Jenelle CR (Tab CR) Kdur 20 MEQ Take 60 mEq by mouth.        Potassium Chloride Jenelle CR (Tab CR) Kdur 20 MEQ TAKE 1 TABLETS THREE TIMES A DAY        PredniSONE (Tab) DELTASONE 10 MG Take 10 mg by mouth every day.        PredniSONE (Tab) DELTASONE 20 MG         Spironolactone (Tab) ALDACTONE 50 MG Take 50 mg by mouth every day.        Sulfamethoxazole-Trimethoprim (Tab) BACTRIM -160 MG Take 1 Tab by mouth 2 times a day.        Valsartan-Hydrochlorothiazide (Tab) DIOVAN--25 MG TAKE 1 TABLET DAILY        .                 Medicines prescribed today were sent to:     [id number] - [place], [place] - [address] [address] [address] [facility]    [address] [address] [address]    Phone: [phone] Fax: [phone]    Open 24 Hours?: No    [facility] - [place], [place] - [address] [place] [address] [address] [facility] [address]    Phone: [phone] Fax: [phone]    Open 24 Hours?: No      Medication refill instructions:       If  your prescription bottle indicates you have medication refills left, it is not necessary to call your provider’s office. Please contact your pharmacy and they will refill your medication.    If your prescription bottle indicates you do not have any refills left, you may request refills at any time through one of the following ways: The online [employer] system (except Urgent Care), by calling your provider’s office, or by asking your pharmacy to contact your provider’s office with a refill request. Medication refills are processed only during regular business hours and may not be available until the next business day. Your provider may request additional information or to have a follow-up visit with you prior to refilling your medication.   *Please Note: Medication refills are assigned a new Rx number when refilled electronically. Your pharmacy may indicate that no refills were authorized even though a new prescription for the same medication is available at the pharmacy. Please request the medicine by name with the pharmacy before contacting your provider for a refill.           [employer] Access Code: Activation code not generated  Current [employer] Status: Active

## 2017-04-26 NOTE — Clinical Note
[facility]   [address]   [address]  [place] [address]  Phone: [phone]  Fax: [phone]              Av Bob  1945    Encounter Date: 4/26/2017    Larry Patterson M.D.          PROGRESS NOTE:  Subjective:   Av Bob is a 72 y.o. male who presents today for follow-up of coronary disease.  No angina, palpitation or dyspnea. Chemotherapy is going well. No bleeding complications so far. No recurrent neurological events.    Past Medical History   Diagnosis Date   • Hypertension    • Mixed hyperlipidemia    • Nephrolithiasis 2006   • Hypokalemia 2012   • Wound of left leg 2012     Requiring surgery and debridment, Dr. Moore   • Type II or unspecified type diabetes mellitus with neurological manifestations, uncontrolled 9/11/2013   • Polyneuropathy in diabetes(357.2) 9/11/2013   • Coronary atherosclerosis of native coronary artery    • S/P PTCA (percutaneous transluminal coronary angioplasty), RCA, 5/1997, patent 7/10/2009    • Presence of drug coated stent in left circumflex coronary artery      Overlapping 2.5mm Taxus and Mccleary stents in OM, 7/10/2009    • Presence of drug coated stent in LAD coronary artery      Overlapping Mccleary 3.0 stents, 7/10/2009    • Controlled gout 2014   • CKD (chronic kidney disease) stage 3, GFR 30-59 ml/min    • Skin ulcer of calf (CMS-HCC) 2015     Dr. Terry and wound care   • Leg wound, right 9/2016     Followed in wound care   • Cerebrovascular accident (CVA) (CMS-HCC) 12/30/2016     Left hemiparesis, etiology of stroke not established, lymphoma discovered on MRI evaluation of stroke   • Lymphoma (CMS-HCC) 2/19/2017     Large cell     Past Surgical History   Procedure Laterality Date   • Tonsillectomy and adenoidectomy     • Lithotripsy     • Angioplasty  1997     RCA followed by other stents as noted above.    • Wound closure general  4/3/2012     Performed by  GERHARD GILBERT at SURGERY SAME DAY [facility]   • Tonsillectomy and adenoidectomy     • Cataract extraction with iol       bilateral     Family History   Problem Relation Age of Onset   • Heart Disease Father      CAD   • Diabetes Father    • Cancer Mother    • Kidney stones Brother    • Heart Disease Brother      History   Smoking status   • Never Smoker    Smokeless tobacco   • Never Used     Allergies   Allergen Reactions   • Lorazepam Unspecified     Disorientation   • Nkda [No Known Drug Allergy]      Outpatient Encounter Prescriptions as of 4/26/2017   Medication Sig Dispense Refill   • potassium chloride SA (KLOR-CON M20) 20 MEQ Tab CR TAKE 1 TABLETS THREE TIMES A  Tab 3   • insulin lispro, Human, (HUMALOG) 100 UNIT/ML Solution Pen-injector injection Using up to 50 units per day as directed. 45 mL 2   • aspirin (ASA) 325 MG Tab Take 325 mg by mouth every 6 hours as needed.     • nystatin (MYCOSTATIN) Powder Apply  to affected area(s) 3 times a day.     • predniSONE (DELTASONE) 10 MG Tab Take 10 mg by mouth every day.     • acyclovir (ZOVIRAX) 400 MG tablet Take 400 mg by mouth 2 times a day.     • ascorbic acid (VITAMIN C) 500 MG/5ML syrup Take  by mouth every day.     • loratadine (CLARITIN) 10 MG Tab Take 10 mg by mouth every day.     • Magnesium Oxide 400 MG Cap Take  by mouth.     • spironolactone (ALDACTONE) 50 MG Tab Take 50 mg by mouth every day.     • sulfamethoxazole-trimethoprim (BACTRIM DS) 800-160 MG tablet Take 1 Tab by mouth 2 times a day.     • amlodipine (NORVASC) 5 MG Tab Take 1 Tab by mouth every day. 90 Tab 3   • atorvastatin (LIPITOR) 40 MG Tab Take 0.5 Tabs by mouth every day. 90 Tab 3   • Insulin Glargine (TOUJEO SOLOSTAR SC) Inject 10-20 Units as instructed.     • Omega-3 Fatty Acids (OMEGA-3 FISH OIL PO) Take  by mouth.     • metformin ER (GLUCOPHAGE XR) 500 MG TABLET SR 24 HR Take 1 Tab by mouth 2 times a day. 60 Tab 5   • insulin glargine (LANTUS SOLOSTAR) 100 UNIT/ML  Solution Pen-injector injection Inject 72 Units as instructed every evening. 90 mL 2   • clopidogrel (PLAVIX) 75 MG Tab Take 1 Tab by mouth every day. 90 Tab 3   • valsartan-hydrochlorothiazide (DIOVAN-HCT) 320-25 MG per tablet TAKE 1 TABLET DAILY 90 Tab 3   • labetalol (NORMODYNE) 100 MG Tab TAKE 1 TABLET TWICE A  Tab 3   • allopurinol (ZYLOPRIM) 300 MG Tab Take 1 Tab by mouth every day. 90 Tab 3   • fenofibrate (TRICOR) 145 MG Tab Take 1 Tab by mouth every day. 90 Tab 3   • nitroglycerin (NITROSTAT) 0.4 MG SL Tab Place 1 Tab under tongue as needed for Chest Pain. 25 Tab 6   • B-D UF III MINI PEN NEEDLES 31G X 5 MM Misc USE AS DIRECTED WITH INSULIN INJECTIONS 450 Each 2   • CENTRUM SILVER PO QDAY.      • potassium chloride SA (KDUR) 20 MEQ Tab CR Take 60 mEq by mouth.     • predniSONE (DELTASONE) 20 MG Tab   0   • omeprazole (PRILOSEC) 20 MG delayed-release capsule Take 20 mg by mouth every day.     • [DISCONTINUED] potassium chloride SA (KLOR-CON M20) 20 MEQ Tab CR TAKE 2 TABLETS THREE TIMES A  Tab 3   • Cyanocobalamin (VITAMIN B-12) 1000 MCG SUBL Place  under tongue every Sunday.       No facility-administered encounter medications on file as of 4/26/2017.     Review of Systems   Constitutional: Negative for fever and chills.   Respiratory: Negative for cough and wheezing.    Cardiovascular: Negative for orthopnea and PND.   Musculoskeletal: Negative for myalgias and falls.   Psychiatric/Behavioral: Negative for substance abuse.        Objective:   /60 mmHg  Pulse 88  Ht 1.829 m (6')  Wt 101.152 kg (223 lb)  BMI 30.24 kg/m2  SpO2 97%    Physical Exam   Constitutional: He is oriented to person, place, and time. He appears well-developed and well-nourished.   Eyes: Conjunctivae are normal. No scleral icterus.   Neck: No JVD present.   Cardiovascular: Normal rate, regular rhythm, normal heart sounds and intact distal pulses.  Exam reveals no gallop.    No murmur heard.  Pulmonary/Chest:  Effort normal and breath sounds normal.   Musculoskeletal: He exhibits no edema.   Neurological: He is alert and oriented to person, place, and time.   Skin: Skin is warm and dry.   Psychiatric: He has a normal mood and affect. Thought content normal.       Assessment:     1. Atherosclerosis of native coronary artery of native heart without angina pectoris     2. S/P PTCA (percutaneous transluminal coronary angioplasty), RCA, 5/1997, patent 7/10/2009     3. Presence of drug coated stent in left circumflex coronary artery     4. Presence of drug coated stent in LAD coronary artery     5. Benign hypertensive heart disease without heart failure  potassium chloride SA (KLOR-CON M20) 20 MEQ Tab CR   6. History of arterial ischemic stroke       The above assessed cardiovascular problems are clinically stable.  The question remains how long to continue dual antiplatelet therapy. It did not protect him from the neurological event that led to the current lymphoma diagnosis and etiology of that neurological event remains unexplained. He had been on extended dual antiplatelet therapy on the recommendation of his [place] cardiologist who is no longer there and I have not interrupted it so far but would stop promptly if an indication for anticoagulation emerged or any bleeding problems. He is going to continue follow-up with Dr. Rivera who is coordinating his cardio-oncology follow-up including echocardiograms at [facility].    Medical Decision Making:  Today's Assessment / Status / Plan:     He will get his next echocardiogram at [facility] as directed by Dr. Rivera. I made no change in his regimen today. His tendency to spontaneous kaliuresis has required the unusual combination of medications noted above and close surveillance of renal function and potassium which he also gets at [place].  I will be seeing him very shortly after his June chemotherapy which should be his last cycle.      No Recipients

## 2017-04-28 ENCOUNTER — APPOINTMENT (OUTPATIENT)
Dept: PHYSICAL THERAPY | Facility: REHABILITATION | Age: 72
End: 2017-04-28
Payer: MEDICARE

## 2017-05-08 ENCOUNTER — APPOINTMENT (OUTPATIENT)
Dept: PHYSICAL THERAPY | Facility: REHABILITATION | Age: 72
End: 2017-05-08
Payer: MEDICARE

## 2017-05-11 ENCOUNTER — APPOINTMENT (OUTPATIENT)
Dept: PHYSICAL THERAPY | Facility: REHABILITATION | Age: 72
End: 2017-05-11
Payer: MEDICARE

## 2017-05-16 ENCOUNTER — APPOINTMENT (OUTPATIENT)
Dept: PHYSICAL THERAPY | Facility: REHABILITATION | Age: 72
End: 2017-05-16
Payer: MEDICARE

## 2017-05-18 ENCOUNTER — APPOINTMENT (OUTPATIENT)
Dept: PHYSICAL THERAPY | Facility: REHABILITATION | Age: 72
End: 2017-05-18
Payer: MEDICARE

## 2017-05-23 ENCOUNTER — APPOINTMENT (OUTPATIENT)
Dept: PHYSICAL THERAPY | Facility: REHABILITATION | Age: 72
End: 2017-05-23
Payer: MEDICARE

## 2017-05-25 ENCOUNTER — APPOINTMENT (OUTPATIENT)
Dept: PHYSICAL THERAPY | Facility: REHABILITATION | Age: 72
End: 2017-05-25
Payer: MEDICARE

## 2017-06-20 ENCOUNTER — TELEPHONE (OUTPATIENT)
Dept: CARDIOLOGY | Facility: MEDICAL CENTER | Age: 72
End: 2017-06-20

## 2017-06-20 NOTE — TELEPHONE ENCOUNTER
----- Message from Larry Patterson M.D. sent at 6/20/2017  7:10 AM PDT -----  Please let his wife know I talked to Dr. Mejia last week and sent him recordsl

## 2017-06-20 NOTE — TELEPHONE ENCOUNTER
Wife Usha notified.  Av was transferred back to [facility] from rehab for nosebleed & tachycardia. Doing echo & MPI today. Might get call from Dr. White, cardiology re: all this. Dr. Patterson notified.

## 2017-09-21 ENCOUNTER — HOME HEALTH ADMISSION (OUTPATIENT)
Dept: HOME HEALTH SERVICES | Facility: HOME HEALTHCARE | Age: 72
End: 2017-09-21
Payer: MEDICARE

## 2017-09-22 ENCOUNTER — OFFICE VISIT (OUTPATIENT)
Dept: CARDIOLOGY | Facility: MEDICAL CENTER | Age: 72
End: 2017-09-22
Payer: MEDICARE

## 2017-09-22 VITALS
BODY MASS INDEX: 25.87 KG/M2 | DIASTOLIC BLOOD PRESSURE: 76 MMHG | WEIGHT: 191 LBS | HEART RATE: 78 BPM | OXYGEN SATURATION: 97 % | SYSTOLIC BLOOD PRESSURE: 140 MMHG | HEIGHT: 72 IN

## 2017-09-22 DIAGNOSIS — I25.10 ATHEROSCLEROSIS OF NATIVE CORONARY ARTERY OF NATIVE HEART WITHOUT ANGINA PECTORIS: ICD-10-CM

## 2017-09-22 DIAGNOSIS — I11.9 BENIGN HYPERTENSIVE HEART DISEASE WITHOUT HEART FAILURE: ICD-10-CM

## 2017-09-22 DIAGNOSIS — Z98.61 S/P PTCA (PERCUTANEOUS TRANSLUMINAL CORONARY ANGIOPLASTY): ICD-10-CM

## 2017-09-22 DIAGNOSIS — Z95.5 PRESENCE OF DRUG COATED STENT IN LAD CORONARY ARTERY: ICD-10-CM

## 2017-09-22 DIAGNOSIS — I10 BENIGN ESSENTIAL HTN: ICD-10-CM

## 2017-09-22 DIAGNOSIS — Z95.5 PRESENCE OF DRUG COATED STENT IN LEFT CIRCUMFLEX CORONARY ARTERY: ICD-10-CM

## 2017-09-22 DIAGNOSIS — Z86.73 HISTORY OF ARTERIAL ISCHEMIC STROKE: ICD-10-CM

## 2017-09-22 DIAGNOSIS — E78.2 MIXED HYPERLIPIDEMIA: ICD-10-CM

## 2017-09-22 LAB — EKG IMPRESSION: NORMAL

## 2017-09-22 PROCEDURE — 99214 OFFICE O/P EST MOD 30 MIN: CPT | Performed by: INTERNAL MEDICINE

## 2017-09-22 PROCEDURE — 93000 ELECTROCARDIOGRAM COMPLETE: CPT | Performed by: INTERNAL MEDICINE

## 2017-09-22 RX ORDER — METOPROLOL SUCCINATE 200 MG/1
200 TABLET, EXTENDED RELEASE ORAL
COMMUNITY
Start: 2017-08-02 | End: 2017-09-22 | Stop reason: SDUPTHER

## 2017-09-22 RX ORDER — LISINOPRIL 10 MG/1
10 TABLET ORAL
COMMUNITY
Start: 2017-08-02 | End: 2017-09-26 | Stop reason: SDUPTHER

## 2017-09-22 RX ORDER — FENOFIBRATE 145 MG/1
145 TABLET, COATED ORAL
COMMUNITY
End: 2017-09-25

## 2017-09-22 RX ORDER — METOPROLOL SUCCINATE 200 MG/1
200 TABLET, EXTENDED RELEASE ORAL DAILY
Qty: 30 TAB | Refills: 5 | Status: SHIPPED | OUTPATIENT
Start: 2017-09-22 | End: 2017-11-28 | Stop reason: SDUPTHER

## 2017-09-22 RX ORDER — TRAMADOL HYDROCHLORIDE 50 MG/1
50 TABLET ORAL EVERY 4 HOURS PRN
COMMUNITY
End: 2017-10-05

## 2017-09-22 RX ORDER — FLUOXETINE HYDROCHLORIDE 20 MG/1
40 CAPSULE ORAL DAILY
COMMUNITY
End: 2017-12-18 | Stop reason: SDUPTHER

## 2017-09-22 NOTE — LETTER
[facility]   [address]   [address]  [place] [address]  Phone: [phone]  Fax: [phone]              Av Bob  1945    Encounter Date: 9/22/2017    Shabbir Moon D.O.    Thank you for the referral. I had the pleasure of seeing Av Bob today in cardiology clinic. I've attached my visit note below. If you have any questions please feel free to give me a call anytime.      Mary Whitehead MD, PhD, [facility]  Cardiology and Lipidology  [facility]                                                                    PROGRESS NOTE:  Chief Complaint   Patient presents with   • Follow-Up     previous patient      CAD, CVA with left weakness;    This patient is an established male who is here today to discuss:  See above; Told that stress test was positive and may need [facility] from [place], [place]  Frustrated with his inability to walk    Patient Active Problem List    Diagnosis Date Noted   • Essential hypertension, benign 03/22/2017   • History of arterial ischemic stroke 03/22/2017   • Lymphoma (CMS-HCC) 02/19/2017   • Type 2 diabetes mellitus treated with insulin (CMS-HCC) 12/30/2016   • DM (diabetes mellitus) type II controlled, neurological manifestation (CMS-HCC) 08/25/2016   • CKD (chronic kidney disease) stage 3, GFR 30-59 ml/min 08/25/2016   • Old myocardial infarction 05/07/2014   • Coronary atherosclerosis of native coronary artery    • Controlled gout 01/28/2014   • Diabetic polyneuropathy (CMS-HCC) 09/11/2013   • Encounter for long-term (current) use of insulin (CMS-HCC) 09/11/2013   • Hypokalemia 12/20/2012   • Presence of drug coated stent in LAD coronary artery 12/13/2011   • Presence of drug coated stent in left circumflex coronary artery 12/13/2011   • S/P PTCA (percutaneous transluminal coronary angioplasty), RCA, 5/1997, patent 7/10/2009 12/13/2011   •  Mixed hyperlipidemia 12/13/2011   • Benign hypertensive heart disease without heart failure 12/13/2011       Past Medical History:   Diagnosis Date   • Lymphoma (CMS-HCC) 2/19/2017    Large cell   • Cerebrovascular accident (CVA) (CMS-HCC) 12/30/2016    Left hemiparesis, etiology of stroke not established, lymphoma discovered on MRI evaluation of stroke   • Leg wound, right 9/2016    Followed in wound care   • Skin ulcer of calf (CMS-HCC) 2015    Dr. Terry and wound care   • Controlled gout 2014   • Type II or unspecified type diabetes mellitus with neurological manifestations, uncontrolled 9/11/2013   • Polyneuropathy in diabetes (CMS-HCC) 9/11/2013   • Hypokalemia 2012   • Wound of left leg 2012    Requiring surgery and debridment, Dr. Moore   • Nephrolithiasis 2006   • CKD (chronic kidney disease) stage 3, GFR 30-59 ml/min    • Coronary atherosclerosis of native coronary artery    • Hypertension    • Mixed hyperlipidemia    • Presence of drug coated stent in LAD coronary artery     Overlapping [place] 3.0 stents, 7/10/2009    • Presence of drug coated stent in left circumflex coronary artery     Overlapping 2.5mm Taxus and [place] stents in OM, 7/10/2009    • S/P PTCA (percutaneous transluminal coronary angioplasty), RCA, 5/1997, patent 7/10/2009      Past Surgical History:   Procedure Laterality Date   • WOUND CLOSURE GENERAL  4/3/2012    Performed by GERHARD MOORE at SURGERY SAME DAY [facility]   • ANGIOPLASTY  1997    RCA followed by other stents as noted above.    • CATARACT EXTRACTION WITH IOL      bilateral   • LITHOTRIPSY     • TONSILLECTOMY AND ADENOIDECTOMY     • TONSILLECTOMY AND ADENOIDECTOMY       Social History     Social History   • Marital status:      Spouse name: N/A   • Number of children: N/A   • Years of education: N/A     Social History Main Topics   • Smoking status: Never Smoker   • Smokeless tobacco: Never Used   • Alcohol use No   • Drug use: No   • Sexual activity: Not  Currently     Partners: Female     Other Topics Concern   • Not on file     Social History Narrative   • No narrative on file     Family History   Problem Relation Age of Onset   • Heart Disease Father      CAD   • Diabetes Father    • Cancer Mother    • Kidney stones Brother    • Heart Disease Brother        Current Outpatient Prescriptions   Medication Sig Dispense Refill   • insulin detemir (LEVEMIR) 100 UNIT/ML Solution Inject  as instructed every evening.     • tramadol (ULTRAM) 50 MG Tab Take 50 mg by mouth every four hours as needed.     • fluoxetine (PROZAC) 20 MG Cap Take 20 mg by mouth every day.     • lisinopril (PRINIVIL) 10 MG Tab Take 10 mg by mouth.     • fenofibrate (TRICOR) 145 MG Tab Take 145 mg by mouth.     • insulin NPH (HUMULIN,NOVOLIN) 100 UNIT/ML Suspension Inject  as instructed.     • metoprolol (TOPROL-XL) 200 MG XL tablet Take 1 Tab by mouth every day. 30 Tab 5   • nystatin (MYCOSTATIN) Powder Apply  to affected area(s) 3 times a day.     • ascorbic acid (VITAMIN C) 500 MG/5ML syrup Take  by mouth every day.     • Magnesium Oxide 400 MG Cap Take  by mouth.     • omeprazole (PRILOSEC) 20 MG delayed-release capsule Take 20 mg by mouth every day.     • spironolactone (ALDACTONE) 50 MG Tab Take 50 mg by mouth every day.     • atorvastatin (LIPITOR) 40 MG Tab Take 0.5 Tabs by mouth every day. 90 Tab 3   • Omega-3 Fatty Acids (OMEGA-3 FISH OIL PO) Take  by mouth.     • metformin ER (GLUCOPHAGE XR) 500 MG TABLET SR 24 HR Take 1 Tab by mouth 2 times a day. (Patient taking differently: Take 1,000 mg by mouth 2 times a day.) 60 Tab 5   • allopurinol (ZYLOPRIM) 300 MG Tab Take 1 Tab by mouth every day. (Patient taking differently: Take 200 mg by mouth every day.) 90 Tab 3   • fenofibrate (TRICOR) 145 MG Tab Take 1 Tab by mouth every day. 90 Tab 3   • nitroglycerin (NITROSTAT) 0.4 MG SL Tab Place 1 Tab under tongue as needed for Chest Pain. 25 Tab 6   • CENTRUM SILVER PO QDAY.      • potassium  chloride SA (KDUR) 20 MEQ Tab CR Take 60 mEq by mouth.     • predniSONE (DELTASONE) 20 MG Tab   0   • potassium chloride SA (KLOR-CON M20) 20 MEQ Tab CR TAKE 1 TABLETS THREE TIMES A  Tab 3   • insulin lispro, Human, (HUMALOG) 100 UNIT/ML Solution Pen-injector injection Using up to 50 units per day as directed. 45 mL 2   • aspirin (ASA) 325 MG Tab Take 325 mg by mouth every 6 hours as needed.     • predniSONE (DELTASONE) 10 MG Tab Take 10 mg by mouth every day.     • acyclovir (ZOVIRAX) 400 MG tablet Take 400 mg by mouth 2 times a day.     • loratadine (CLARITIN) 10 MG Tab Take 10 mg by mouth every day.     • sulfamethoxazole-trimethoprim (BACTRIM DS) 800-160 MG tablet Take 1 Tab by mouth 2 times a day.     • Insulin Glargine (TOUJEO SOLOSTAR SC) Inject 10-20 Units as instructed.     • insulin glargine (LANTUS SOLOSTAR) 100 UNIT/ML Solution Pen-injector injection Inject 72 Units as instructed every evening. 90 mL 2   • clopidogrel (PLAVIX) 75 MG Tab Take 1 Tab by mouth every day. 90 Tab 3   • B-D UF III MINI PEN NEEDLES 31G X 5 MM Misc USE AS DIRECTED WITH INSULIN INJECTIONS 450 Each 2   • Cyanocobalamin (VITAMIN B-12) 1000 MCG SUBL Place  under tongue every Sunday.       No current facility-administered medications for this visit.      Lorazepam and Nkda [no known drug allergy]    Review of Systems:   In wheelchair  Constitutional: Denies fevers, Denies weight changes  Eyes: Denies changes in vision, no eye pain  Ears/Nose/Throat/Mouth: Denies nasal congestion or sore throat   Cardiovascular: Denies chest pain or palpitations   Respiratory: Denies shortness of breath , Denies cough  Gastrointestinal/Hepatic: Denies abdominal pain, nausea, vomiting, diarrhea, constipation or GI bleeding   Genitourinary: Denies bladder dysfunction, dysuria or frequency  Musculoskeletal/Rheum: Denies  joint pain and swelling   Skin/Breast: Denies rash, denies breast lumps or discharge  Neurological: Denies headache, confusion,  memory loss or focal weakness/parasthesias  Psychiatric: denies mood disorder   Endocrine: denies hx of diabetes or thyroid dysfunction  Heme/Oncology/Lymph Nodes: Denies enlarged lymph nodes, denies brusing or known bleeding disorder  Allergic/Immunologic: Denies hx of allergies      All other systems were reviewed and are negative (AMA/CMS criteria)      Blood pressure 140/76, pulse 78, height 1.829 m (6'), weight 86.6 kg (191 lb), SpO2 97 %.  General Appearance:   Well developed, Well nourished, No acute distress, Non-toxic appearance.   HENT:  Normocephalic, Atraumatic, Oropharynx moist mucous membranes, Dentition: , Nose normal.    Eyes:  PERRLA, EOMI, Conjunctiva normal, No discharge.  Neck:  Normal range of motion, No cervical tenderness, Supple, No stridor, no JVDcm.  No thyromegaly.  No carotid bruit.  Cardiovascular:  Normal heart rate, Normal rhythm,  S1, S2, no S3, S4; No gallops; No murmurs, No rubs, .   Extremitites with intact distal pulses, no cyanosis, clubbing or edema.  No heaves, thrills, HJR;  Peripheral pulses: carotid 2+, brachial 2+, radial 2+, ulnar 2+, femoral 2+, popliteal 2+, PT 2+, DP 2+;  Lungs:  Respiratory effort is normal. Normal breath sounds, breath sounds clear to auscultation bilaterally,  no rales, no rhonchi, no wheezing.   Abdomen: Bowel sounds normal, Soft, No tenderness, No guarding, No rebound, No masses, No hepatosplenomegaly.  Skin: Warm, Dry, No erythema, No rash, no induration or crepitus.  Neurologic: Left weakness; Alert & oriented x 3, Normal motor function, Normal sensory function, No focal deficits noted, cranial nerves II through XII are normal, wheelchair; Lack memory with rehab and cristina virus;   Psychiatric: Affect normal, Judgment normal, Mood normal.      Assessment and Plan.   72 y.o. male has CAD, CVA; will need to review his cardiac eval record and to decide if needing LHC;     1. Benign essential HTN  Med reviewed  - EKG    2. Atherosclerosis of native  coronary artery of native heart without angina pectoris  Will check his record when available    3. S/P PTCA (percutaneous transluminal coronary angioplasty), RCA, 5/1997, patent 7/10/2009    - ECHOCARDIOGRAM COMP W/O CONT; Future    4. Presence of drug coated stent in left circumflex coronary artery      5. Benign hypertensive heart disease without heart failure  controlled    6. History of arterial ischemic stroke  reviewed    7. Mixed hyperlipidemia  recheck    8. Presence of drug coated stent in LAD coronary artery  reviewed      Return to clinic in  1 , months    1. Benign essential HTN  EKG   2. Atherosclerosis of native coronary artery of native heart without angina pectoris     3. S/P PTCA (percutaneous transluminal coronary angioplasty), RCA, 5/1997, patent 7/10/2009  ECHOCARDIOGRAM COMP W/O CONT   4. Presence of drug coated stent in left circumflex coronary artery     5. Benign hypertensive heart disease without heart failure     6. History of arterial ischemic stroke     7. Mixed hyperlipidemia     8. Presence of drug coated stent in LAD coronary artery           Shabbir Moon D.O.  [address] [address]  [place] [id number]  VIA Facsimile: [phone]

## 2017-09-22 NOTE — PROGRESS NOTES
Chief Complaint   Patient presents with   • Follow-Up     previous patient      CAD, CVA with left weakness;    This patient is an established male who is here today to discuss:  See above; Told that stress test was positive and may need [facility] from [place], [place]  Frustrated with his inability to walk    Patient Active Problem List    Diagnosis Date Noted   • Essential hypertension, benign 03/22/2017   • History of arterial ischemic stroke 03/22/2017   • Lymphoma (CMS-HCC) 02/19/2017   • Type 2 diabetes mellitus treated with insulin (CMS-HCC) 12/30/2016   • DM (diabetes mellitus) type II controlled, neurological manifestation (CMS-HCC) 08/25/2016   • CKD (chronic kidney disease) stage 3, GFR 30-59 ml/min 08/25/2016   • Old myocardial infarction 05/07/2014   • Coronary atherosclerosis of native coronary artery    • Controlled gout 01/28/2014   • Diabetic polyneuropathy (CMS-HCC) 09/11/2013   • Encounter for long-term (current) use of insulin (CMS-HCC) 09/11/2013   • Hypokalemia 12/20/2012   • Presence of drug coated stent in LAD coronary artery 12/13/2011   • Presence of drug coated stent in left circumflex coronary artery 12/13/2011   • S/P PTCA (percutaneous transluminal coronary angioplasty), RCA, 5/1997, patent 7/10/2009 12/13/2011   • Mixed hyperlipidemia 12/13/2011   • Benign hypertensive heart disease without heart failure 12/13/2011       Past Medical History:   Diagnosis Date   • Lymphoma (CMS-HCC) 2/19/2017    Large cell   • Cerebrovascular accident (CVA) (CMS-HCC) 12/30/2016    Left hemiparesis, etiology of stroke not established, lymphoma discovered on MRI evaluation of stroke   • Leg wound, right 9/2016    Followed in wound care   • Skin ulcer of calf (CMS-HCC) 2015    Dr. Terry and wound care   • Controlled gout 2014   • Type II or unspecified type diabetes mellitus with neurological manifestations, uncontrolled 9/11/2013   • Polyneuropathy in diabetes (CMS-HCC) 9/11/2013   • Hypokalemia 2012    • Wound of left leg 2012    Requiring surgery and debridment, Dr. Moore   • Nephrolithiasis 2006   • CKD (chronic kidney disease) stage 3, GFR 30-59 ml/min    • Coronary atherosclerosis of native coronary artery    • Hypertension    • Mixed hyperlipidemia    • Presence of drug coated stent in LAD coronary artery     Overlapping [place] 3.0 stents, 7/10/2009    • Presence of drug coated stent in left circumflex coronary artery     Overlapping 2.5mm Taxus and [place] stents in OM, 7/10/2009    • S/P PTCA (percutaneous transluminal coronary angioplasty), RCA, 5/1997, patent 7/10/2009      Past Surgical History:   Procedure Laterality Date   • WOUND CLOSURE GENERAL  4/3/2012    Performed by GERHARD MOORE at SURGERY SAME DAY [facility]   • ANGIOPLASTY  1997    RCA followed by other stents as noted above.    • CATARACT EXTRACTION WITH IOL      bilateral   • LITHOTRIPSY     • TONSILLECTOMY AND ADENOIDECTOMY     • TONSILLECTOMY AND ADENOIDECTOMY       Social History     Social History   • Marital status:      Spouse name: N/A   • Number of children: N/A   • Years of education: N/A     Social History Main Topics   • Smoking status: Never Smoker   • Smokeless tobacco: Never Used   • Alcohol use No   • Drug use: No   • Sexual activity: Not Currently     Partners: Female     Other Topics Concern   • Not on file     Social History Narrative   • No narrative on file     Family History   Problem Relation Age of Onset   • Heart Disease Father      CAD   • Diabetes Father    • Cancer Mother    • Kidney stones Brother    • Heart Disease Brother        Current Outpatient Prescriptions   Medication Sig Dispense Refill   • insulin detemir (LEVEMIR) 100 UNIT/ML Solution Inject  as instructed every evening.     • tramadol (ULTRAM) 50 MG Tab Take 50 mg by mouth every four hours as needed.     • fluoxetine (PROZAC) 20 MG Cap Take 20 mg by mouth every day.     • lisinopril (PRINIVIL) 10 MG Tab Take 10 mg by mouth.     •  fenofibrate (TRICOR) 145 MG Tab Take 145 mg by mouth.     • insulin NPH (HUMULIN,NOVOLIN) 100 UNIT/ML Suspension Inject  as instructed.     • metoprolol (TOPROL-XL) 200 MG XL tablet Take 1 Tab by mouth every day. 30 Tab 5   • nystatin (MYCOSTATIN) Powder Apply  to affected area(s) 3 times a day.     • ascorbic acid (VITAMIN C) 500 MG/5ML syrup Take  by mouth every day.     • Magnesium Oxide 400 MG Cap Take  by mouth.     • omeprazole (PRILOSEC) 20 MG delayed-release capsule Take 20 mg by mouth every day.     • spironolactone (ALDACTONE) 50 MG Tab Take 50 mg by mouth every day.     • atorvastatin (LIPITOR) 40 MG Tab Take 0.5 Tabs by mouth every day. 90 Tab 3   • Omega-3 Fatty Acids (OMEGA-3 FISH OIL PO) Take  by mouth.     • metformin ER (GLUCOPHAGE XR) 500 MG TABLET SR 24 HR Take 1 Tab by mouth 2 times a day. (Patient taking differently: Take 1,000 mg by mouth 2 times a day.) 60 Tab 5   • allopurinol (ZYLOPRIM) 300 MG Tab Take 1 Tab by mouth every day. (Patient taking differently: Take 200 mg by mouth every day.) 90 Tab 3   • fenofibrate (TRICOR) 145 MG Tab Take 1 Tab by mouth every day. 90 Tab 3   • nitroglycerin (NITROSTAT) 0.4 MG SL Tab Place 1 Tab under tongue as needed for Chest Pain. 25 Tab 6   • CENTRUM SILVER PO QDAY.      • potassium chloride SA (KDUR) 20 MEQ Tab CR Take 60 mEq by mouth.     • predniSONE (DELTASONE) 20 MG Tab   0   • potassium chloride SA (KLOR-CON M20) 20 MEQ Tab CR TAKE 1 TABLETS THREE TIMES A  Tab 3   • insulin lispro, Human, (HUMALOG) 100 UNIT/ML Solution Pen-injector injection Using up to 50 units per day as directed. 45 mL 2   • aspirin (ASA) 325 MG Tab Take 325 mg by mouth every 6 hours as needed.     • predniSONE (DELTASONE) 10 MG Tab Take 10 mg by mouth every day.     • acyclovir (ZOVIRAX) 400 MG tablet Take 400 mg by mouth 2 times a day.     • loratadine (CLARITIN) 10 MG Tab Take 10 mg by mouth every day.     • sulfamethoxazole-trimethoprim (BACTRIM DS) 800-160 MG tablet  Take 1 Tab by mouth 2 times a day.     • Insulin Glargine (TOUJEO SOLOSTAR SC) Inject 10-20 Units as instructed.     • insulin glargine (LANTUS SOLOSTAR) 100 UNIT/ML Solution Pen-injector injection Inject 72 Units as instructed every evening. 90 mL 2   • clopidogrel (PLAVIX) 75 MG Tab Take 1 Tab by mouth every day. 90 Tab 3   • B-D UF III MINI PEN NEEDLES 31G X 5 MM Misc USE AS DIRECTED WITH INSULIN INJECTIONS 450 Each 2   • Cyanocobalamin (VITAMIN B-12) 1000 MCG SUBL Place  under tongue every Sunday.       No current facility-administered medications for this visit.      Lorazepam and Nkda [no known drug allergy]    Review of Systems:   In wheelchair  Constitutional: Denies fevers, Denies weight changes  Eyes: Denies changes in vision, no eye pain  Ears/Nose/Throat/Mouth: Denies nasal congestion or sore throat   Cardiovascular: Denies chest pain or palpitations   Respiratory: Denies shortness of breath , Denies cough  Gastrointestinal/Hepatic: Denies abdominal pain, nausea, vomiting, diarrhea, constipation or GI bleeding   Genitourinary: Denies bladder dysfunction, dysuria or frequency  Musculoskeletal/Rheum: Denies  joint pain and swelling   Skin/Breast: Denies rash, denies breast lumps or discharge  Neurological: Denies headache, confusion, memory loss or focal weakness/parasthesias  Psychiatric: denies mood disorder   Endocrine: denies hx of diabetes or thyroid dysfunction  Heme/Oncology/Lymph Nodes: Denies enlarged lymph nodes, denies brusing or known bleeding disorder  Allergic/Immunologic: Denies hx of allergies      All other systems were reviewed and are negative (AMA/CMS criteria)      Blood pressure 140/76, pulse 78, height 1.829 m (6'), weight 86.6 kg (191 lb), SpO2 97 %.  General Appearance:   Well developed, Well nourished, No acute distress, Non-toxic appearance.   HENT:  Normocephalic, Atraumatic, Oropharynx moist mucous membranes, Dentition: , Nose normal.    Eyes:  PERRLA, EOMI, Conjunctiva normal,  No discharge.  Neck:  Normal range of motion, No cervical tenderness, Supple, No stridor, no JVDcm.  No thyromegaly.  No carotid bruit.  Cardiovascular:  Normal heart rate, Normal rhythm,  S1, S2, no S3, S4; No gallops; No murmurs, No rubs, .   Extremitites with intact distal pulses, no cyanosis, clubbing or edema.  No heaves, thrills, HJR;  Peripheral pulses: carotid 2+, brachial 2+, radial 2+, ulnar 2+, femoral 2+, popliteal 2+, PT 2+, DP 2+;  Lungs:  Respiratory effort is normal. Normal breath sounds, breath sounds clear to auscultation bilaterally,  no rales, no rhonchi, no wheezing.   Abdomen: Bowel sounds normal, Soft, No tenderness, No guarding, No rebound, No masses, No hepatosplenomegaly.  Skin: Warm, Dry, No erythema, No rash, no induration or crepitus.  Neurologic: Left weakness; Alert & oriented x 3, Normal motor function, Normal sensory function, No focal deficits noted, cranial nerves II through XII are normal, wheelchair; Lack memory with rehab and cristina virus;   Psychiatric: Affect normal, Judgment normal, Mood normal.      Assessment and Plan.   72 y.o. male has CAD, CVA; will need to review his cardiac eval record and to decide if needing LHC;     1. Benign essential HTN  Med reviewed  - EKG    2. Atherosclerosis of native coronary artery of native heart without angina pectoris  Will check his record when available    3. S/P PTCA (percutaneous transluminal coronary angioplasty), RCA, 5/1997, patent 7/10/2009    - ECHOCARDIOGRAM COMP W/O CONT; Future    4. Presence of drug coated stent in left circumflex coronary artery      5. Benign hypertensive heart disease without heart failure  controlled    6. History of arterial ischemic stroke  reviewed    7. Mixed hyperlipidemia  recheck    8. Presence of drug coated stent in LAD coronary artery  reviewed      Return to clinic in  1 , months    1. Benign essential HTN  EKG   2. Atherosclerosis of native coronary artery of native heart without angina  pectoris     3. S/P PTCA (percutaneous transluminal coronary angioplasty), RCA, 5/1997, patent 7/10/2009  ECHOCARDIOGRAM COMP W/O CONT   4. Presence of drug coated stent in left circumflex coronary artery     5. Benign hypertensive heart disease without heart failure     6. History of arterial ischemic stroke     7. Mixed hyperlipidemia     8. Presence of drug coated stent in LAD coronary artery

## 2017-09-25 ENCOUNTER — HOME CARE VISIT (OUTPATIENT)
Dept: HOME HEALTH SERVICES | Facility: HOME HEALTHCARE | Age: 72
End: 2017-09-25
Payer: MEDICARE

## 2017-09-25 VITALS
WEIGHT: 195.4 LBS | BODY MASS INDEX: 29.61 KG/M2 | RESPIRATION RATE: 16 BRPM | SYSTOLIC BLOOD PRESSURE: 139 MMHG | HEART RATE: 69 BPM | HEIGHT: 68 IN | DIASTOLIC BLOOD PRESSURE: 71 MMHG | OXYGEN SATURATION: 99 % | TEMPERATURE: 98.8 F

## 2017-09-25 PROCEDURE — 665999 HH PPS REVENUE DEBIT

## 2017-09-25 PROCEDURE — G0162 HHC RN E&M PLAN SVS, 15 MIN: HCPCS

## 2017-09-25 PROCEDURE — 665998 HH PPS REVENUE CREDIT

## 2017-09-25 PROCEDURE — 665001 SOC-HOME HEALTH

## 2017-09-25 SDOH — ECONOMIC STABILITY: HOUSING INSECURITY: HOME SAFETY: PT HAS SEVERAL RUGS THAT COULD BE POTENTIAL TRIP HAZARDS.

## 2017-09-25 SDOH — ECONOMIC STABILITY: HOUSING INSECURITY: UNSAFE APPLIANCES: 0

## 2017-09-25 SDOH — ECONOMIC STABILITY: HOUSING INSECURITY: UNSAFE COOKING RANGE AREA: 0

## 2017-09-25 ASSESSMENT — ENCOUNTER SYMPTOMS
VOMITING: DECLINES
DEPRESSED MOOD: 1
DIFFICULTY THINKING: 1

## 2017-09-25 ASSESSMENT — PATIENT HEALTH QUESTIONNAIRE - PHQ9
2. FEELING DOWN, DEPRESSED, IRRITABLE, OR HOPELESS: 01
1. LITTLE INTEREST OR PLEASURE IN DOING THINGS: 00

## 2017-09-25 ASSESSMENT — ACTIVITIES OF DAILY LIVING (ADL)
HOME_HEALTH_OASIS: 01
OASIS_M1830: 05

## 2017-09-26 ENCOUNTER — HOME CARE VISIT (OUTPATIENT)
Dept: HOME HEALTH SERVICES | Facility: HOME HEALTHCARE | Age: 72
End: 2017-09-26
Payer: MEDICARE

## 2017-09-26 ENCOUNTER — HOSPITAL ENCOUNTER (OUTPATIENT)
Dept: LAB | Facility: MEDICAL CENTER | Age: 72
End: 2017-09-26
Attending: UROLOGY
Payer: MEDICARE

## 2017-09-26 VITALS
TEMPERATURE: 98 F | OXYGEN SATURATION: 99 % | HEART RATE: 74 BPM | DIASTOLIC BLOOD PRESSURE: 100 MMHG | SYSTOLIC BLOOD PRESSURE: 153 MMHG

## 2017-09-26 DIAGNOSIS — I10 ESSENTIAL HYPERTENSION, BENIGN: ICD-10-CM

## 2017-09-26 LAB — PSA SERPL-MCNC: 0.66 NG/ML (ref 0–4)

## 2017-09-26 PROCEDURE — 665999 HH PPS REVENUE DEBIT

## 2017-09-26 PROCEDURE — 84153 ASSAY OF PSA TOTAL: CPT | Mod: GA

## 2017-09-26 PROCEDURE — G0156 HHCP-SVS OF AIDE,EA 15 MIN: HCPCS

## 2017-09-26 PROCEDURE — 36415 COLL VENOUS BLD VENIPUNCTURE: CPT | Mod: GA

## 2017-09-26 PROCEDURE — 665998 HH PPS REVENUE CREDIT

## 2017-09-26 RX ORDER — LISINOPRIL 10 MG/1
10 TABLET ORAL DAILY
Qty: 90 TAB | Refills: 3 | OUTPATIENT
Start: 2017-09-26 | End: 2017-11-02

## 2017-09-27 ENCOUNTER — HOME CARE VISIT (OUTPATIENT)
Dept: HOME HEALTH SERVICES | Facility: HOME HEALTHCARE | Age: 72
End: 2017-09-27
Payer: MEDICARE

## 2017-09-27 ENCOUNTER — HOSPITAL ENCOUNTER (OUTPATIENT)
Facility: MEDICAL CENTER | Age: 72
End: 2017-09-27
Attending: UROLOGY
Payer: MEDICARE

## 2017-09-27 PROCEDURE — 665999 HH PPS REVENUE DEBIT

## 2017-09-27 PROCEDURE — 87186 SC STD MICRODIL/AGAR DIL: CPT

## 2017-09-27 PROCEDURE — 665998 HH PPS REVENUE CREDIT

## 2017-09-27 PROCEDURE — 87086 URINE CULTURE/COLONY COUNT: CPT

## 2017-09-27 PROCEDURE — 87077 CULTURE AEROBIC IDENTIFY: CPT

## 2017-09-28 ENCOUNTER — HOME CARE VISIT (OUTPATIENT)
Dept: HOME HEALTH SERVICES | Facility: HOME HEALTHCARE | Age: 72
End: 2017-09-28
Payer: MEDICARE

## 2017-09-28 VITALS
WEIGHT: 195.25 LBS | TEMPERATURE: 98 F | BODY MASS INDEX: 29.69 KG/M2 | RESPIRATION RATE: 16 BRPM | OXYGEN SATURATION: 99 % | DIASTOLIC BLOOD PRESSURE: 76 MMHG | HEART RATE: 77 BPM | SYSTOLIC BLOOD PRESSURE: 112 MMHG

## 2017-09-28 VITALS
SYSTOLIC BLOOD PRESSURE: 112 MMHG | DIASTOLIC BLOOD PRESSURE: 76 MMHG | OXYGEN SATURATION: 99 % | HEART RATE: 77 BPM | TEMPERATURE: 98 F | RESPIRATION RATE: 16 BRPM

## 2017-09-28 PROCEDURE — 665998 HH PPS REVENUE CREDIT

## 2017-09-28 PROCEDURE — G0151 HHCP-SERV OF PT,EA 15 MIN: HCPCS

## 2017-09-28 PROCEDURE — G0152 HHCP-SERV OF OT,EA 15 MIN: HCPCS

## 2017-09-28 PROCEDURE — 665999 HH PPS REVENUE DEBIT

## 2017-09-28 ASSESSMENT — ACTIVITIES OF DAILY LIVING (ADL)
ADLS_COMMENTS: <!--EPICS-->SEE OT EVAL<!--EPICE-->
TRANSPORTATION COMMENTS: LEFT HEMIPARESIS
IADLS_COMMENTS: <!--EPICS-->SEE OT EVAL<!--EPICE-->

## 2017-09-29 ENCOUNTER — TELEPHONE (OUTPATIENT)
Dept: VASCULAR LAB | Facility: MEDICAL CENTER | Age: 72
End: 2017-09-29

## 2017-09-29 ENCOUNTER — HOME CARE VISIT (OUTPATIENT)
Dept: HOME HEALTH SERVICES | Facility: HOME HEALTHCARE | Age: 72
End: 2017-09-29
Payer: MEDICARE

## 2017-09-29 ENCOUNTER — HOSPITAL ENCOUNTER (OUTPATIENT)
Facility: MEDICAL CENTER | Age: 72
End: 2017-09-29
Attending: INTERNAL MEDICINE
Payer: MEDICARE

## 2017-09-29 VITALS
SYSTOLIC BLOOD PRESSURE: 126 MMHG | RESPIRATION RATE: 20 BRPM | OXYGEN SATURATION: 98 % | TEMPERATURE: 97.7 F | HEART RATE: 66 BPM | DIASTOLIC BLOOD PRESSURE: 60 MMHG

## 2017-09-29 VITALS
RESPIRATION RATE: 20 BRPM | OXYGEN SATURATION: 98 % | HEART RATE: 66 BPM | SYSTOLIC BLOOD PRESSURE: 126 MMHG | DIASTOLIC BLOOD PRESSURE: 60 MMHG | TEMPERATURE: 97.7 F

## 2017-09-29 VITALS
TEMPERATURE: 98.5 F | RESPIRATION RATE: 16 BRPM | HEART RATE: 60 BPM | SYSTOLIC BLOOD PRESSURE: 108 MMHG | DIASTOLIC BLOOD PRESSURE: 78 MMHG

## 2017-09-29 LAB — MAGNESIUM SERPL-MCNC: 1.3 MG/DL (ref 1.5–2.5)

## 2017-09-29 PROCEDURE — G0155 HHCP-SVS OF CSW,EA 15 MIN: HCPCS

## 2017-09-29 PROCEDURE — 665998 HH PPS REVENUE CREDIT

## 2017-09-29 PROCEDURE — G0156 HHCP-SVS OF AIDE,EA 15 MIN: HCPCS

## 2017-09-29 PROCEDURE — G0299 HHS/HOSPICE OF RN EA 15 MIN: HCPCS

## 2017-09-29 PROCEDURE — 83735 ASSAY OF MAGNESIUM: CPT

## 2017-09-29 PROCEDURE — G0153 HHCP-SVS OF S/L PATH,EA 15MN: HCPCS

## 2017-09-29 PROCEDURE — A4209 5+ CC STERILE SYRINGE&NEEDLE: HCPCS

## 2017-09-29 PROCEDURE — A4215 STERILE NEEDLE: HCPCS

## 2017-09-29 PROCEDURE — 665999 HH PPS REVENUE DEBIT

## 2017-09-29 ASSESSMENT — ENCOUNTER SYMPTOMS
POOR JUDGMENT: 1
DIFFICULTY THINKING: 1

## 2017-09-29 NOTE — TELEPHONE ENCOUNTER
Medications reviewed.  No clinically significant interactions.  Requested lipid panel from PCP, Dr. Moon to assess further need for both atorvastatin and fenofibrate.  Last K level WNL    Claire Jauregui, PharmD

## 2017-09-30 PROCEDURE — 665999 HH PPS REVENUE DEBIT

## 2017-09-30 PROCEDURE — 665998 HH PPS REVENUE CREDIT

## 2017-10-01 LAB
BACTERIA UR CULT: ABNORMAL
SIGNIFICANT IND 70042: ABNORMAL
SOURCE SOURCE: ABNORMAL

## 2017-10-01 PROCEDURE — 665998 HH PPS REVENUE CREDIT

## 2017-10-01 PROCEDURE — 665999 HH PPS REVENUE DEBIT

## 2017-10-02 ENCOUNTER — HOME CARE VISIT (OUTPATIENT)
Dept: HOME HEALTH SERVICES | Facility: HOME HEALTHCARE | Age: 72
End: 2017-10-02
Payer: MEDICARE

## 2017-10-02 VITALS
TEMPERATURE: 98 F | SYSTOLIC BLOOD PRESSURE: 100 MMHG | HEART RATE: 63 BPM | DIASTOLIC BLOOD PRESSURE: 70 MMHG | RESPIRATION RATE: 16 BRPM

## 2017-10-02 PROCEDURE — 665999 HH PPS REVENUE DEBIT

## 2017-10-02 PROCEDURE — G0153 HHCP-SVS OF S/L PATH,EA 15MN: HCPCS

## 2017-10-02 PROCEDURE — 665998 HH PPS REVENUE CREDIT

## 2017-10-02 ASSESSMENT — ENCOUNTER SYMPTOMS: DIFFICULTY THINKING: 1

## 2017-10-03 ENCOUNTER — HOME CARE VISIT (OUTPATIENT)
Dept: HOME HEALTH SERVICES | Facility: HOME HEALTHCARE | Age: 72
End: 2017-10-03
Payer: MEDICARE

## 2017-10-03 ENCOUNTER — HOSPITAL ENCOUNTER (OUTPATIENT)
Facility: MEDICAL CENTER | Age: 72
End: 2017-10-03
Attending: FAMILY MEDICINE
Payer: MEDICARE

## 2017-10-03 VITALS
RESPIRATION RATE: 20 BRPM | OXYGEN SATURATION: 100 % | TEMPERATURE: 98.2 F | DIASTOLIC BLOOD PRESSURE: 89 MMHG | HEART RATE: 69 BPM | SYSTOLIC BLOOD PRESSURE: 129 MMHG

## 2017-10-03 PROCEDURE — A4215 STERILE NEEDLE: HCPCS

## 2017-10-03 PROCEDURE — G0156 HHCP-SVS OF AIDE,EA 15 MIN: HCPCS

## 2017-10-03 PROCEDURE — 665998 HH PPS REVENUE CREDIT

## 2017-10-03 PROCEDURE — 665999 HH PPS REVENUE DEBIT

## 2017-10-03 PROCEDURE — G0300 HHS/HOSPICE OF LPN EA 15 MIN: HCPCS

## 2017-10-03 PROCEDURE — 83735 ASSAY OF MAGNESIUM: CPT

## 2017-10-03 PROCEDURE — 80061 LIPID PANEL: CPT

## 2017-10-03 PROCEDURE — A4209 5+ CC STERILE SYRINGE&NEEDLE: HCPCS

## 2017-10-03 PROCEDURE — G0152 HHCP-SERV OF OT,EA 15 MIN: HCPCS

## 2017-10-04 ENCOUNTER — HOME CARE VISIT (OUTPATIENT)
Dept: HOME HEALTH SERVICES | Facility: HOME HEALTHCARE | Age: 72
End: 2017-10-04
Payer: MEDICARE

## 2017-10-04 VITALS
DIASTOLIC BLOOD PRESSURE: 80 MMHG | RESPIRATION RATE: 18 BRPM | SYSTOLIC BLOOD PRESSURE: 130 MMHG | TEMPERATURE: 97.9 F | HEART RATE: 67 BPM

## 2017-10-04 VITALS
SYSTOLIC BLOOD PRESSURE: 112 MMHG | DIASTOLIC BLOOD PRESSURE: 64 MMHG | HEART RATE: 61 BPM | TEMPERATURE: 98.1 F | OXYGEN SATURATION: 98 %

## 2017-10-04 VITALS
RESPIRATION RATE: 20 BRPM | OXYGEN SATURATION: 100 % | TEMPERATURE: 98.2 F | HEART RATE: 69 BPM | SYSTOLIC BLOOD PRESSURE: 129 MMHG | DIASTOLIC BLOOD PRESSURE: 89 MMHG

## 2017-10-04 VITALS
RESPIRATION RATE: 18 BRPM | DIASTOLIC BLOOD PRESSURE: 80 MMHG | HEART RATE: 67 BPM | TEMPERATURE: 97.9 F | SYSTOLIC BLOOD PRESSURE: 130 MMHG | OXYGEN SATURATION: 98 %

## 2017-10-04 LAB
CHOLEST SERPL-MCNC: 110 MG/DL (ref 100–199)
HDLC SERPL-MCNC: 37 MG/DL
LDLC SERPL CALC-MCNC: 45 MG/DL
MAGNESIUM SERPL-MCNC: 1.5 MG/DL (ref 1.5–2.5)
TRIGL SERPL-MCNC: 141 MG/DL (ref 0–149)

## 2017-10-04 PROCEDURE — 665999 HH PPS REVENUE DEBIT

## 2017-10-04 PROCEDURE — G0157 HHC PT ASSISTANT EA 15: HCPCS

## 2017-10-04 PROCEDURE — 665998 HH PPS REVENUE CREDIT

## 2017-10-04 PROCEDURE — G0153 HHCP-SVS OF S/L PATH,EA 15MN: HCPCS

## 2017-10-04 ASSESSMENT — ENCOUNTER SYMPTOMS
DEBILITATING PAIN: 1
DIFFICULTY THINKING: 1
POOR JUDGMENT: 1
POOR JUDGMENT: 1

## 2017-10-05 ENCOUNTER — HOSPITAL ENCOUNTER (OUTPATIENT)
Facility: MEDICAL CENTER | Age: 72
End: 2017-10-05
Payer: MEDICARE

## 2017-10-05 ENCOUNTER — HOSPITAL ENCOUNTER (OUTPATIENT)
Dept: CARDIOLOGY | Facility: MEDICAL CENTER | Age: 72
End: 2017-10-05
Attending: INTERNAL MEDICINE
Payer: MEDICARE

## 2017-10-05 ENCOUNTER — HOME CARE VISIT (OUTPATIENT)
Dept: HOME HEALTH SERVICES | Facility: HOME HEALTHCARE | Age: 72
End: 2017-10-05
Payer: MEDICARE

## 2017-10-05 VITALS
OXYGEN SATURATION: 99 % | BODY MASS INDEX: 29.04 KG/M2 | RESPIRATION RATE: 18 BRPM | DIASTOLIC BLOOD PRESSURE: 80 MMHG | TEMPERATURE: 98.3 F | WEIGHT: 191 LBS | SYSTOLIC BLOOD PRESSURE: 117 MMHG | HEART RATE: 68 BPM

## 2017-10-05 DIAGNOSIS — Z98.61 S/P PTCA (PERCUTANEOUS TRANSLUMINAL CORONARY ANGIOPLASTY): ICD-10-CM

## 2017-10-05 LAB
APPEARANCE UR: ABNORMAL
BACTERIA #/AREA URNS HPF: ABNORMAL /HPF
BILIRUB UR QL STRIP.AUTO: NEGATIVE
COLOR UR: YELLOW
EPI CELLS #/AREA URNS HPF: NEGATIVE /HPF
GLUCOSE UR STRIP.AUTO-MCNC: NEGATIVE MG/DL
KETONES UR STRIP.AUTO-MCNC: NEGATIVE MG/DL
LEUKOCYTE ESTERASE UR QL STRIP.AUTO: ABNORMAL
LV EJECT FRACT  99904: 65
LV EJECT FRACT MOD 2C 99903: 65.34
LV EJECT FRACT MOD 4C 99902: 60.08
LV EJECT FRACT MOD BP 99901: 62.15
MICRO URNS: ABNORMAL
MUCOUS THREADS #/AREA URNS HPF: ABNORMAL /HPF
NITRITE UR QL STRIP.AUTO: NEGATIVE
PH UR STRIP.AUTO: 7 [PH]
PROT UR QL STRIP: 30 MG/DL
RBC # URNS HPF: ABNORMAL /HPF
RBC UR QL AUTO: NEGATIVE
SP GR UR STRIP.AUTO: 1.01
UROBILINOGEN UR STRIP.AUTO-MCNC: NORMAL MG/DL
WBC #/AREA URNS HPF: ABNORMAL /HPF

## 2017-10-05 PROCEDURE — 93306 TTE W/DOPPLER COMPLETE: CPT

## 2017-10-05 PROCEDURE — 93306 TTE W/DOPPLER COMPLETE: CPT | Mod: 26 | Performed by: INTERNAL MEDICINE

## 2017-10-05 PROCEDURE — 81001 URINALYSIS AUTO W/SCOPE: CPT

## 2017-10-05 PROCEDURE — G0152 HHCP-SERV OF OT,EA 15 MIN: HCPCS

## 2017-10-05 PROCEDURE — 665998 HH PPS REVENUE CREDIT

## 2017-10-05 PROCEDURE — 665999 HH PPS REVENUE DEBIT

## 2017-10-06 ENCOUNTER — HOSPITAL ENCOUNTER (OUTPATIENT)
Facility: MEDICAL CENTER | Age: 72
End: 2017-10-06
Attending: FAMILY MEDICINE
Payer: MEDICARE

## 2017-10-06 ENCOUNTER — HOME CARE VISIT (OUTPATIENT)
Dept: HOME HEALTH SERVICES | Facility: HOME HEALTHCARE | Age: 72
End: 2017-10-06
Payer: MEDICARE

## 2017-10-06 VITALS
OXYGEN SATURATION: 98 % | HEART RATE: 62 BPM | SYSTOLIC BLOOD PRESSURE: 110 MMHG | RESPIRATION RATE: 16 BRPM | DIASTOLIC BLOOD PRESSURE: 60 MMHG | TEMPERATURE: 97.3 F

## 2017-10-06 VITALS
DIASTOLIC BLOOD PRESSURE: 60 MMHG | RESPIRATION RATE: 16 BRPM | TEMPERATURE: 97.3 F | HEART RATE: 62 BPM | SYSTOLIC BLOOD PRESSURE: 110 MMHG | OXYGEN SATURATION: 98 %

## 2017-10-06 LAB
CHOLEST SERPL-MCNC: 110 MG/DL (ref 100–199)
HDLC SERPL-MCNC: 42 MG/DL
LDLC SERPL CALC-MCNC: 45 MG/DL
TRIGL SERPL-MCNC: 117 MG/DL (ref 0–149)

## 2017-10-06 PROCEDURE — 6650420 HCR  SYRINGE 12 ML

## 2017-10-06 PROCEDURE — 665999 HH PPS REVENUE DEBIT

## 2017-10-06 PROCEDURE — G0299 HHS/HOSPICE OF RN EA 15 MIN: HCPCS

## 2017-10-06 PROCEDURE — 80061 LIPID PANEL: CPT

## 2017-10-06 PROCEDURE — 36415 COLL VENOUS BLD VENIPUNCTURE: CPT

## 2017-10-06 PROCEDURE — G0157 HHC PT ASSISTANT EA 15: HCPCS

## 2017-10-06 PROCEDURE — 665998 HH PPS REVENUE CREDIT

## 2017-10-06 PROCEDURE — A4215 STERILE NEEDLE: HCPCS

## 2017-10-06 PROCEDURE — G0156 HHCP-SVS OF AIDE,EA 15 MIN: HCPCS

## 2017-10-07 VITALS
OXYGEN SATURATION: 98 % | TEMPERATURE: 97.3 F | HEART RATE: 62 BPM | SYSTOLIC BLOOD PRESSURE: 110 MMHG | WEIGHT: 191 LBS | BODY MASS INDEX: 29.04 KG/M2 | RESPIRATION RATE: 16 BRPM | DIASTOLIC BLOOD PRESSURE: 60 MMHG

## 2017-10-07 PROCEDURE — 665999 HH PPS REVENUE DEBIT

## 2017-10-07 PROCEDURE — 665998 HH PPS REVENUE CREDIT

## 2017-10-07 ASSESSMENT — ENCOUNTER SYMPTOMS: DEPRESSED MOOD: 1

## 2017-10-08 PROCEDURE — 665999 HH PPS REVENUE DEBIT

## 2017-10-08 PROCEDURE — 665998 HH PPS REVENUE CREDIT

## 2017-10-09 ENCOUNTER — HOME CARE VISIT (OUTPATIENT)
Dept: HOME HEALTH SERVICES | Facility: HOME HEALTHCARE | Age: 72
End: 2017-10-09
Payer: MEDICARE

## 2017-10-09 ENCOUNTER — TELEPHONE (OUTPATIENT)
Dept: CARDIOLOGY | Facility: MEDICAL CENTER | Age: 72
End: 2017-10-09

## 2017-10-09 VITALS
HEART RATE: 65 BPM | OXYGEN SATURATION: 98 % | DIASTOLIC BLOOD PRESSURE: 63 MMHG | SYSTOLIC BLOOD PRESSURE: 118 MMHG | TEMPERATURE: 97.2 F | RESPIRATION RATE: 18 BRPM

## 2017-10-09 VITALS
HEART RATE: 62 BPM | OXYGEN SATURATION: 96 % | DIASTOLIC BLOOD PRESSURE: 60 MMHG | TEMPERATURE: 97.2 F | SYSTOLIC BLOOD PRESSURE: 100 MMHG | RESPIRATION RATE: 20 BRPM

## 2017-10-09 DIAGNOSIS — I10 ESSENTIAL HYPERTENSION: ICD-10-CM

## 2017-10-09 PROCEDURE — G0157 HHC PT ASSISTANT EA 15: HCPCS

## 2017-10-09 PROCEDURE — 665998 HH PPS REVENUE CREDIT

## 2017-10-09 PROCEDURE — G0493 RN CARE EA 15 MIN HH/HOSPICE: HCPCS

## 2017-10-09 PROCEDURE — 665999 HH PPS REVENUE DEBIT

## 2017-10-09 NOTE — TELEPHONE ENCOUNTER
Please let him know the mag was low.  Is he getting replacement now?  I ordered the requested lab including a repeat mag.

## 2017-10-10 ENCOUNTER — HOME CARE VISIT (OUTPATIENT)
Dept: HOME HEALTH SERVICES | Facility: HOME HEALTHCARE | Age: 72
End: 2017-10-10
Payer: MEDICARE

## 2017-10-10 PROCEDURE — G0156 HHCP-SVS OF AIDE,EA 15 MIN: HCPCS

## 2017-10-10 PROCEDURE — G0152 HHCP-SERV OF OT,EA 15 MIN: HCPCS

## 2017-10-10 PROCEDURE — 665999 HH PPS REVENUE DEBIT

## 2017-10-10 PROCEDURE — 665998 HH PPS REVENUE CREDIT

## 2017-10-11 ENCOUNTER — OFFICE VISIT (OUTPATIENT)
Dept: ENDOCRINOLOGY | Facility: MEDICAL CENTER | Age: 72
End: 2017-10-11
Payer: MEDICARE

## 2017-10-11 ENCOUNTER — HOME CARE VISIT (OUTPATIENT)
Dept: HOME HEALTH SERVICES | Facility: HOME HEALTHCARE | Age: 72
End: 2017-10-11
Payer: MEDICARE

## 2017-10-11 ENCOUNTER — TELEPHONE (OUTPATIENT)
Dept: ENDOCRINOLOGY | Facility: MEDICAL CENTER | Age: 72
End: 2017-10-11

## 2017-10-11 VITALS
DIASTOLIC BLOOD PRESSURE: 62 MMHG | OXYGEN SATURATION: 98 % | TEMPERATURE: 97.6 F | HEART RATE: 75 BPM | SYSTOLIC BLOOD PRESSURE: 92 MMHG | RESPIRATION RATE: 18 BRPM

## 2017-10-11 VITALS
HEART RATE: 56 BPM | SYSTOLIC BLOOD PRESSURE: 125 MMHG | DIASTOLIC BLOOD PRESSURE: 80 MMHG | RESPIRATION RATE: 18 BRPM | TEMPERATURE: 98.3 F | OXYGEN SATURATION: 97 %

## 2017-10-11 VITALS
WEIGHT: 191 LBS | HEART RATE: 71 BPM | DIASTOLIC BLOOD PRESSURE: 80 MMHG | SYSTOLIC BLOOD PRESSURE: 138 MMHG | OXYGEN SATURATION: 100 % | HEIGHT: 72 IN | BODY MASS INDEX: 25.87 KG/M2

## 2017-10-11 DIAGNOSIS — I11.9 BENIGN HYPERTENSIVE HEART DISEASE WITHOUT HEART FAILURE: ICD-10-CM

## 2017-10-11 DIAGNOSIS — Z79.4 TYPE 2 DIABETES MELLITUS WITH DIABETIC NEUROPATHY, WITH LONG-TERM CURRENT USE OF INSULIN (HCC): ICD-10-CM

## 2017-10-11 DIAGNOSIS — N18.30 CKD (CHRONIC KIDNEY DISEASE) STAGE 3, GFR 30-59 ML/MIN (HCC): ICD-10-CM

## 2017-10-11 DIAGNOSIS — E78.2 MIXED HYPERLIPIDEMIA: ICD-10-CM

## 2017-10-11 DIAGNOSIS — I10 ESSENTIAL HYPERTENSION, BENIGN: ICD-10-CM

## 2017-10-11 DIAGNOSIS — E11.40 TYPE 2 DIABETES MELLITUS WITH DIABETIC NEUROPATHY, WITH LONG-TERM CURRENT USE OF INSULIN (HCC): ICD-10-CM

## 2017-10-11 DIAGNOSIS — E87.6 HYPOKALEMIA: ICD-10-CM

## 2017-10-11 DIAGNOSIS — C85.90 LYMPHOMA, UNSPECIFIED BODY REGION, UNSPECIFIED LYMPHOMA TYPE (HCC): ICD-10-CM

## 2017-10-11 DIAGNOSIS — I10 ESSENTIAL HYPERTENSION: ICD-10-CM

## 2017-10-11 LAB
HBA1C MFR BLD: 7.1 % (ref ?–5.8)
INT CON NEG: NORMAL
INT CON POS: NORMAL

## 2017-10-11 PROCEDURE — 665999 HH PPS REVENUE DEBIT

## 2017-10-11 PROCEDURE — G0157 HHC PT ASSISTANT EA 15: HCPCS

## 2017-10-11 PROCEDURE — 83036 HEMOGLOBIN GLYCOSYLATED A1C: CPT | Performed by: INTERNAL MEDICINE

## 2017-10-11 PROCEDURE — 665998 HH PPS REVENUE CREDIT

## 2017-10-11 PROCEDURE — 99214 OFFICE O/P EST MOD 30 MIN: CPT | Performed by: INTERNAL MEDICINE

## 2017-10-11 ASSESSMENT — ENCOUNTER SYMPTOMS: DIFFICULTY THINKING: 1

## 2017-10-11 NOTE — TELEPHONE ENCOUNTER
Called Pt and NIKIA in regards to his Metformin dosage which is Metformin 500mg BID    Thank you  Mariluz

## 2017-10-11 NOTE — PROGRESS NOTES
Endocrinology Clinic Progress Note    CC: Diabetes    HPI:  Av Bob is a 72 y.o. old patient who comes in today for routine follow up.     Type 2 diabetes: He is currently on Toujeo 5-10 units at bedtime and Humalog 5-10 units with meals. If his blood sugar readings are running below 140 he does not take insulin, there are days when he does not take insulin for 2-3 days in a row and still blood sugars are mostly close to 150. He is also on metformin 1000 mg twice a day. No hypoglycemia.    Hypertension: Blood pressure is well controlled. He reports compliance with medications. He is on ACE inhibitor.    Hyperlipidemia: He is currently on fenofibrate and Lipitor. Cholesterol levels are goal.    ROS:  Constitutional: No unintentional weight loss  Endo: Denies excessive thirst or frequent urination    PMH:  Type 2 diabetes  Hypertension next line hyperlipidemia    EXAM:  Vital signs: /80   Pulse 71   Ht 1.829 m (6')   Wt 86.6 kg (191 lb)   SpO2 100%   BMI 25.90 kg/m²   General: No apparent distress, cooperative  Eyes: No scleral icterus, no discharge  Neck: Normal on external inspection  Resp: Normal effort  Psych: Alert and oriented, normal mood and affect    Assessment and Plan:    1. Type 2 diabetes mellitus with diabetic neuropathy, with long-term current use of insulin (CMS-[facility])  · Hemoglobin A1c today in the clinic is 7.1%  · Goal hemoglobin A1c less than 7.5%  · No changes to medications today  · Hopefully by next clinic visit we will be able to discontinue insulin and potentially start another agent, we could start GLP-1 agonist    2. Mixed hyperlipidemia  · Continue Lipitor and fenofibrate    3. Essential hypertension  · Blood pressure is well controlled    Return in about 4 months (around 2/11/2018).    Thank you for allowing me to participate in the care of this patient.    Trinidad Maguire M.D.    CC:   Shabbir Moon D.O.    This note was created using voice recognition  software (Dragon). The accuracy of the dictation is limited by the abilities of the software. I have reviewed the note prior to signing, however some errors in grammar and context are still possible. If you have any questions related to this note please do not hesitate to contact our office.

## 2017-10-11 NOTE — TELEPHONE ENCOUNTER
Pt wife called and she want to verify if Pt still need to continue Metformin 1000 mg BID or 500 mg BID    Pls. Advise    Thank you  Mariluz

## 2017-10-11 NOTE — TELEPHONE ENCOUNTER
Let's do 500 mg twice a day for now. Once kidney function is stable we can increase the dose if kidney function looks good.

## 2017-10-12 ENCOUNTER — HOME CARE VISIT (OUTPATIENT)
Dept: HOME HEALTH SERVICES | Facility: HOME HEALTHCARE | Age: 72
End: 2017-10-12
Payer: MEDICARE

## 2017-10-12 VITALS
SYSTOLIC BLOOD PRESSURE: 122 MMHG | TEMPERATURE: 97.1 F | DIASTOLIC BLOOD PRESSURE: 70 MMHG | HEART RATE: 82 BPM | OXYGEN SATURATION: 100 % | RESPIRATION RATE: 18 BRPM

## 2017-10-12 PROCEDURE — 665999 HH PPS REVENUE DEBIT

## 2017-10-12 PROCEDURE — G0152 HHCP-SERV OF OT,EA 15 MIN: HCPCS

## 2017-10-12 PROCEDURE — 665998 HH PPS REVENUE CREDIT

## 2017-10-12 ASSESSMENT — ENCOUNTER SYMPTOMS: DIFFICULTY THINKING: 1

## 2017-10-13 ENCOUNTER — HOME CARE VISIT (OUTPATIENT)
Dept: HOME HEALTH SERVICES | Facility: HOME HEALTHCARE | Age: 72
End: 2017-10-13
Payer: MEDICARE

## 2017-10-13 ENCOUNTER — TELEPHONE (OUTPATIENT)
Dept: CARDIOLOGY | Facility: MEDICAL CENTER | Age: 72
End: 2017-10-13

## 2017-10-13 VITALS
HEART RATE: 75 BPM | TEMPERATURE: 97.6 F | OXYGEN SATURATION: 98 % | DIASTOLIC BLOOD PRESSURE: 62 MMHG | SYSTOLIC BLOOD PRESSURE: 92 MMHG | RESPIRATION RATE: 18 BRPM

## 2017-10-13 VITALS
SYSTOLIC BLOOD PRESSURE: 134 MMHG | TEMPERATURE: 97.3 F | HEART RATE: 60 BPM | DIASTOLIC BLOOD PRESSURE: 64 MMHG | OXYGEN SATURATION: 99 % | RESPIRATION RATE: 18 BRPM

## 2017-10-13 PROCEDURE — G0156 HHCP-SVS OF AIDE,EA 15 MIN: HCPCS

## 2017-10-13 PROCEDURE — G0299 HHS/HOSPICE OF RN EA 15 MIN: HCPCS

## 2017-10-13 PROCEDURE — 665998 HH PPS REVENUE CREDIT

## 2017-10-13 PROCEDURE — 665999 HH PPS REVENUE DEBIT

## 2017-10-13 RX ORDER — SPIRONOLACTONE 50 MG/1
50 TABLET, FILM COATED ORAL DAILY
Qty: 90 TAB | Refills: 3 | Status: SHIPPED | OUTPATIENT
Start: 2017-10-13 | End: 2018-10-03 | Stop reason: SDUPTHER

## 2017-10-13 NOTE — TELEPHONE ENCOUNTER
----- Message from Madison Alanis R.N. sent at 10/13/2017  2:30 PM PDT -----  Regarding: FW: Msg sent to Queta  I sent Queta a message via epic.    ----- Message -----  From: Madison Alanis R.N.  Sent: 10/13/2017  10:27 AM  To: Madison Alanis R.N.  Subject: Msg sent to Queta                                    ----- Message -----  From: Larry Patterson M.D.  Sent: 10/13/2017   7:28 AM  To: Madison Alanis R.N.    Is Queta drawing the lab on him?  He does need it.

## 2017-10-14 PROCEDURE — 665998 HH PPS REVENUE CREDIT

## 2017-10-14 PROCEDURE — 665999 HH PPS REVENUE DEBIT

## 2017-10-15 VITALS
OXYGEN SATURATION: 99 % | RESPIRATION RATE: 18 BRPM | TEMPERATURE: 97.3 F | SYSTOLIC BLOOD PRESSURE: 132 MMHG | DIASTOLIC BLOOD PRESSURE: 64 MMHG | HEART RATE: 60 BPM

## 2017-10-15 PROCEDURE — 665999 HH PPS REVENUE DEBIT

## 2017-10-15 PROCEDURE — 665998 HH PPS REVENUE CREDIT

## 2017-10-15 ASSESSMENT — ENCOUNTER SYMPTOMS: RESPIRATORY SYMPTOMS COMMENTS: DENIES SOB, RESP EVEN AND UNLABORED

## 2017-10-16 ENCOUNTER — HOME CARE VISIT (OUTPATIENT)
Dept: HOME HEALTH SERVICES | Facility: HOME HEALTHCARE | Age: 72
End: 2017-10-16
Payer: MEDICARE

## 2017-10-16 VITALS
SYSTOLIC BLOOD PRESSURE: 140 MMHG | TEMPERATURE: 97.9 F | DIASTOLIC BLOOD PRESSURE: 80 MMHG | HEART RATE: 74 BPM | OXYGEN SATURATION: 94 % | RESPIRATION RATE: 18 BRPM

## 2017-10-16 VITALS
TEMPERATURE: 99.4 F | SYSTOLIC BLOOD PRESSURE: 130 MMHG | HEART RATE: 68 BPM | RESPIRATION RATE: 16 BRPM | DIASTOLIC BLOOD PRESSURE: 70 MMHG

## 2017-10-16 PROCEDURE — G0157 HHC PT ASSISTANT EA 15: HCPCS

## 2017-10-16 PROCEDURE — 665998 HH PPS REVENUE CREDIT

## 2017-10-16 PROCEDURE — 665999 HH PPS REVENUE DEBIT

## 2017-10-16 PROCEDURE — G0153 HHCP-SVS OF S/L PATH,EA 15MN: HCPCS

## 2017-10-16 ASSESSMENT — ENCOUNTER SYMPTOMS: DIFFICULTY THINKING: 1

## 2017-10-17 ENCOUNTER — HOSPITAL ENCOUNTER (OUTPATIENT)
Facility: MEDICAL CENTER | Age: 72
End: 2017-10-17
Attending: INTERNAL MEDICINE
Payer: MEDICARE

## 2017-10-17 ENCOUNTER — HOME CARE VISIT (OUTPATIENT)
Dept: HOME HEALTH SERVICES | Facility: HOME HEALTHCARE | Age: 72
End: 2017-10-17
Payer: MEDICARE

## 2017-10-17 VITALS
SYSTOLIC BLOOD PRESSURE: 124 MMHG | OXYGEN SATURATION: 96 % | TEMPERATURE: 98.3 F | DIASTOLIC BLOOD PRESSURE: 64 MMHG | HEART RATE: 55 BPM | RESPIRATION RATE: 16 BRPM

## 2017-10-17 VITALS
HEART RATE: 55 BPM | SYSTOLIC BLOOD PRESSURE: 124 MMHG | TEMPERATURE: 98.3 F | DIASTOLIC BLOOD PRESSURE: 64 MMHG | OXYGEN SATURATION: 96 %

## 2017-10-17 VITALS
DIASTOLIC BLOOD PRESSURE: 64 MMHG | TEMPERATURE: 98.3 F | RESPIRATION RATE: 16 BRPM | SYSTOLIC BLOOD PRESSURE: 124 MMHG | HEART RATE: 55 BPM | OXYGEN SATURATION: 96 %

## 2017-10-17 LAB
BASOPHILS # BLD AUTO: 0.5 % (ref 0–1.8)
BASOPHILS # BLD: 0.04 K/UL (ref 0–0.12)
EOSINOPHIL # BLD AUTO: 0.19 K/UL (ref 0–0.51)
EOSINOPHIL NFR BLD: 2.4 % (ref 0–6.9)
ERYTHROCYTE [DISTWIDTH] IN BLOOD BY AUTOMATED COUNT: 58.6 FL (ref 35.9–50)
HCT VFR BLD AUTO: 40.6 % (ref 42–52)
HGB BLD-MCNC: 13.2 G/DL (ref 14–18)
IMM GRANULOCYTES # BLD AUTO: 0.05 K/UL (ref 0–0.11)
IMM GRANULOCYTES NFR BLD AUTO: 0.6 % (ref 0–0.9)
LYMPHOCYTES # BLD AUTO: 0.83 K/UL (ref 1–4.8)
LYMPHOCYTES NFR BLD: 10.3 % (ref 22–41)
MCH RBC QN AUTO: 27.8 PG (ref 27–33)
MCHC RBC AUTO-ENTMCNC: 32.5 G/DL (ref 33.7–35.3)
MCV RBC AUTO: 85.5 FL (ref 81.4–97.8)
MONOCYTES # BLD AUTO: 0.51 K/UL (ref 0–0.85)
MONOCYTES NFR BLD AUTO: 6.3 % (ref 0–13.4)
NEUTROPHILS # BLD AUTO: 6.46 K/UL (ref 1.82–7.42)
NEUTROPHILS NFR BLD: 79.9 % (ref 44–72)
NRBC # BLD AUTO: 0 K/UL
NRBC BLD AUTO-RTO: 0 /100 WBC
PLATELET # BLD AUTO: 333 K/UL (ref 164–446)
PMV BLD AUTO: 10.2 FL (ref 9–12.9)
RBC # BLD AUTO: 4.75 M/UL (ref 4.7–6.1)
WBC # BLD AUTO: 8.1 K/UL (ref 4.8–10.8)

## 2017-10-17 PROCEDURE — 80053 COMPREHEN METABOLIC PANEL: CPT

## 2017-10-17 PROCEDURE — 84443 ASSAY THYROID STIM HORMONE: CPT

## 2017-10-17 PROCEDURE — G0156 HHCP-SVS OF AIDE,EA 15 MIN: HCPCS

## 2017-10-17 PROCEDURE — G0300 HHS/HOSPICE OF LPN EA 15 MIN: HCPCS

## 2017-10-17 PROCEDURE — 665999 HH PPS REVENUE DEBIT

## 2017-10-17 PROCEDURE — 85025 COMPLETE CBC W/AUTO DIFF WBC: CPT

## 2017-10-17 PROCEDURE — 665998 HH PPS REVENUE CREDIT

## 2017-10-17 PROCEDURE — G0152 HHCP-SERV OF OT,EA 15 MIN: HCPCS

## 2017-10-17 ASSESSMENT — ENCOUNTER SYMPTOMS: DEBILITATING PAIN: 1

## 2017-10-18 LAB
ALBUMIN SERPL BCP-MCNC: 3.7 G/DL (ref 3.2–4.9)
ALBUMIN/GLOB SERPL: 1.7 G/DL
ALP SERPL-CCNC: 58 U/L (ref 30–99)
ALT SERPL-CCNC: 16 U/L (ref 2–50)
ANION GAP SERPL CALC-SCNC: 8 MMOL/L (ref 0–11.9)
AST SERPL-CCNC: 20 U/L (ref 12–45)
BILIRUB SERPL-MCNC: 0.6 MG/DL (ref 0.1–1.5)
BUN SERPL-MCNC: 19 MG/DL (ref 8–22)
CALCIUM SERPL-MCNC: 9.3 MG/DL (ref 8.5–10.5)
CHLORIDE SERPL-SCNC: 102 MMOL/L (ref 96–112)
CO2 SERPL-SCNC: 26 MMOL/L (ref 20–33)
CREAT SERPL-MCNC: 1.25 MG/DL (ref 0.5–1.4)
GFR SERPL CREATININE-BSD FRML MDRD: 57 ML/MIN/1.73 M 2
GLOBULIN SER CALC-MCNC: 2.2 G/DL (ref 1.9–3.5)
GLUCOSE SERPL-MCNC: 118 MG/DL (ref 65–99)
POTASSIUM SERPL-SCNC: 4.1 MMOL/L (ref 3.6–5.5)
PROT SERPL-MCNC: 5.9 G/DL (ref 6–8.2)
SODIUM SERPL-SCNC: 136 MMOL/L (ref 135–145)
TSH SERPL DL<=0.005 MIU/L-ACNC: 2.1 UIU/ML (ref 0.3–3.7)

## 2017-10-18 PROCEDURE — 665999 HH PPS REVENUE DEBIT

## 2017-10-18 PROCEDURE — 665998 HH PPS REVENUE CREDIT

## 2017-10-19 ENCOUNTER — HOME CARE VISIT (OUTPATIENT)
Dept: HOME HEALTH SERVICES | Facility: HOME HEALTHCARE | Age: 72
End: 2017-10-19
Payer: MEDICARE

## 2017-10-19 VITALS
HEART RATE: 70 BPM | OXYGEN SATURATION: 98 % | TEMPERATURE: 97.7 F | DIASTOLIC BLOOD PRESSURE: 70 MMHG | SYSTOLIC BLOOD PRESSURE: 130 MMHG | RESPIRATION RATE: 18 BRPM

## 2017-10-19 VITALS
RESPIRATION RATE: 18 BRPM | SYSTOLIC BLOOD PRESSURE: 130 MMHG | DIASTOLIC BLOOD PRESSURE: 70 MMHG | TEMPERATURE: 97.7 F | OXYGEN SATURATION: 98 % | HEART RATE: 70 BPM

## 2017-10-19 PROCEDURE — G0152 HHCP-SERV OF OT,EA 15 MIN: HCPCS

## 2017-10-19 PROCEDURE — 665999 HH PPS REVENUE DEBIT

## 2017-10-19 PROCEDURE — G0157 HHC PT ASSISTANT EA 15: HCPCS

## 2017-10-19 PROCEDURE — 665998 HH PPS REVENUE CREDIT

## 2017-10-19 ASSESSMENT — ENCOUNTER SYMPTOMS: DIFFICULTY THINKING: 1

## 2017-10-20 ENCOUNTER — HOME CARE VISIT (OUTPATIENT)
Dept: HOME HEALTH SERVICES | Facility: HOME HEALTHCARE | Age: 72
End: 2017-10-20
Payer: MEDICARE

## 2017-10-20 VITALS
RESPIRATION RATE: 16 BRPM | TEMPERATURE: 96.7 F | HEART RATE: 58 BPM | DIASTOLIC BLOOD PRESSURE: 60 MMHG | SYSTOLIC BLOOD PRESSURE: 100 MMHG | OXYGEN SATURATION: 95 %

## 2017-10-20 PROCEDURE — G0156 HHCP-SVS OF AIDE,EA 15 MIN: HCPCS

## 2017-10-20 PROCEDURE — 665999 HH PPS REVENUE DEBIT

## 2017-10-20 PROCEDURE — G0495 RN CARE TRAIN/EDU IN HH: HCPCS

## 2017-10-20 PROCEDURE — 665998 HH PPS REVENUE CREDIT

## 2017-10-21 PROCEDURE — 665998 HH PPS REVENUE CREDIT

## 2017-10-21 PROCEDURE — 665999 HH PPS REVENUE DEBIT

## 2017-10-22 PROCEDURE — 665998 HH PPS REVENUE CREDIT

## 2017-10-22 PROCEDURE — 665999 HH PPS REVENUE DEBIT

## 2017-10-23 ENCOUNTER — HOME CARE VISIT (OUTPATIENT)
Dept: HOME HEALTH SERVICES | Facility: HOME HEALTHCARE | Age: 72
End: 2017-10-23

## 2017-10-23 VITALS
RESPIRATION RATE: 18 BRPM | TEMPERATURE: 96.7 F | SYSTOLIC BLOOD PRESSURE: 100 MMHG | HEART RATE: 58 BPM | OXYGEN SATURATION: 95 % | DIASTOLIC BLOOD PRESSURE: 60 MMHG

## 2017-10-23 PROCEDURE — 665998 HH PPS REVENUE CREDIT

## 2017-10-23 PROCEDURE — 665999 HH PPS REVENUE DEBIT

## 2017-10-24 ENCOUNTER — HOME CARE VISIT (OUTPATIENT)
Dept: HOME HEALTH SERVICES | Facility: HOME HEALTHCARE | Age: 72
End: 2017-10-24
Payer: MEDICARE

## 2017-10-24 VITALS
DIASTOLIC BLOOD PRESSURE: 65 MMHG | OXYGEN SATURATION: 96 % | RESPIRATION RATE: 16 BRPM | SYSTOLIC BLOOD PRESSURE: 110 MMHG | TEMPERATURE: 96.7 F | HEART RATE: 67 BPM

## 2017-10-24 VITALS
SYSTOLIC BLOOD PRESSURE: 110 MMHG | TEMPERATURE: 96.7 F | HEART RATE: 67 BPM | RESPIRATION RATE: 16 BRPM | OXYGEN SATURATION: 96 % | DIASTOLIC BLOOD PRESSURE: 65 MMHG

## 2017-10-24 PROCEDURE — G0156 HHCP-SVS OF AIDE,EA 15 MIN: HCPCS

## 2017-10-24 PROCEDURE — G0153 HHCP-SVS OF S/L PATH,EA 15MN: HCPCS

## 2017-10-24 PROCEDURE — 665999 HH PPS REVENUE DEBIT

## 2017-10-24 PROCEDURE — G0151 HHCP-SERV OF PT,EA 15 MIN: HCPCS

## 2017-10-24 PROCEDURE — 665998 HH PPS REVENUE CREDIT

## 2017-10-24 PROCEDURE — G0152 HHCP-SERV OF OT,EA 15 MIN: HCPCS

## 2017-10-24 ASSESSMENT — ENCOUNTER SYMPTOMS
DEPRESSED MOOD: 1
DIFFICULTY THINKING: 1

## 2017-10-25 ENCOUNTER — HOME CARE VISIT (OUTPATIENT)
Dept: HOME HEALTH SERVICES | Facility: HOME HEALTHCARE | Age: 72
End: 2017-10-25
Payer: MEDICARE

## 2017-10-25 VITALS
HEART RATE: 71 BPM | SYSTOLIC BLOOD PRESSURE: 110 MMHG | RESPIRATION RATE: 16 BRPM | DIASTOLIC BLOOD PRESSURE: 60 MMHG | TEMPERATURE: 98.7 F

## 2017-10-25 VITALS
HEART RATE: 67 BPM | RESPIRATION RATE: 16 BRPM | OXYGEN SATURATION: 96 % | TEMPERATURE: 96.7 F | DIASTOLIC BLOOD PRESSURE: 65 MMHG | SYSTOLIC BLOOD PRESSURE: 110 MMHG

## 2017-10-25 PROCEDURE — 665999 HH PPS REVENUE DEBIT

## 2017-10-25 PROCEDURE — G0495 RN CARE TRAIN/EDU IN HH: HCPCS

## 2017-10-25 PROCEDURE — 665998 HH PPS REVENUE CREDIT

## 2017-10-25 PROCEDURE — A6250 SKIN SEAL PROTECT MOISTURIZR: HCPCS

## 2017-10-25 SDOH — ECONOMIC STABILITY: HOUSING INSECURITY: UNSAFE APPLIANCES: 0

## 2017-10-25 SDOH — ECONOMIC STABILITY: HOUSING INSECURITY: UNSAFE COOKING RANGE AREA: 0

## 2017-10-25 ASSESSMENT — ACTIVITIES OF DAILY LIVING (ADL)
IADLS_COMMENTS: <!--EPICS-->PLEASE REFER TO OT EVALUATION.<BR><!--EPICE-->
ADLS_COMMENTS: <!--EPICS-->PLEASE REFER TO OT EVALUATION.<!--EPICE-->

## 2017-10-25 ASSESSMENT — ENCOUNTER SYMPTOMS: DIFFICULTY THINKING: 1

## 2017-10-26 ENCOUNTER — HOME CARE VISIT (OUTPATIENT)
Dept: HOME HEALTH SERVICES | Facility: HOME HEALTHCARE | Age: 72
End: 2017-10-26
Payer: MEDICARE

## 2017-10-26 VITALS
SYSTOLIC BLOOD PRESSURE: 125 MMHG | RESPIRATION RATE: 18 BRPM | HEART RATE: 59 BPM | OXYGEN SATURATION: 98 % | TEMPERATURE: 97.1 F | DIASTOLIC BLOOD PRESSURE: 82 MMHG

## 2017-10-26 VITALS
SYSTOLIC BLOOD PRESSURE: 110 MMHG | DIASTOLIC BLOOD PRESSURE: 60 MMHG | HEART RATE: 59 BPM | TEMPERATURE: 96.9 F | RESPIRATION RATE: 20 BRPM | OXYGEN SATURATION: 97 %

## 2017-10-26 PROCEDURE — G0153 HHCP-SVS OF S/L PATH,EA 15MN: HCPCS

## 2017-10-26 PROCEDURE — 665998 HH PPS REVENUE CREDIT

## 2017-10-26 PROCEDURE — G0152 HHCP-SERV OF OT,EA 15 MIN: HCPCS

## 2017-10-26 PROCEDURE — 665999 HH PPS REVENUE DEBIT

## 2017-10-26 ASSESSMENT — ENCOUNTER SYMPTOMS
POOR JUDGMENT: 1
DEBILITATING PAIN: 1
POOR JUDGMENT: 1

## 2017-10-27 ENCOUNTER — HOME CARE VISIT (OUTPATIENT)
Dept: HOME HEALTH SERVICES | Facility: HOME HEALTHCARE | Age: 72
End: 2017-10-27
Payer: MEDICARE

## 2017-10-27 VITALS
TEMPERATURE: 97.9 F | DIASTOLIC BLOOD PRESSURE: 80 MMHG | HEART RATE: 66 BPM | OXYGEN SATURATION: 98 % | SYSTOLIC BLOOD PRESSURE: 130 MMHG | RESPIRATION RATE: 18 BRPM

## 2017-10-27 VITALS
SYSTOLIC BLOOD PRESSURE: 130 MMHG | DIASTOLIC BLOOD PRESSURE: 80 MMHG | TEMPERATURE: 97.9 F | HEART RATE: 66 BPM | OXYGEN SATURATION: 98 % | RESPIRATION RATE: 18 BRPM

## 2017-10-27 VITALS
SYSTOLIC BLOOD PRESSURE: 110 MMHG | TEMPERATURE: 98.5 F | RESPIRATION RATE: 16 BRPM | HEART RATE: 58 BPM | DIASTOLIC BLOOD PRESSURE: 62 MMHG

## 2017-10-27 PROCEDURE — G0156 HHCP-SVS OF AIDE,EA 15 MIN: HCPCS

## 2017-10-27 PROCEDURE — 665999 HH PPS REVENUE DEBIT

## 2017-10-27 PROCEDURE — G0157 HHC PT ASSISTANT EA 15: HCPCS

## 2017-10-27 PROCEDURE — 665998 HH PPS REVENUE CREDIT

## 2017-10-27 ASSESSMENT — ENCOUNTER SYMPTOMS
POOR JUDGMENT: 1
DIFFICULTY THINKING: 1

## 2017-10-28 PROCEDURE — 665999 HH PPS REVENUE DEBIT

## 2017-10-28 PROCEDURE — 665998 HH PPS REVENUE CREDIT

## 2017-10-29 PROCEDURE — 665998 HH PPS REVENUE CREDIT

## 2017-10-29 PROCEDURE — 665999 HH PPS REVENUE DEBIT

## 2017-10-30 ENCOUNTER — HOSPITAL ENCOUNTER (OUTPATIENT)
Facility: MEDICAL CENTER | Age: 72
End: 2017-10-30
Attending: UROLOGY
Payer: MEDICARE

## 2017-10-30 DIAGNOSIS — E11.65 UNCONTROLLED TYPE 2 DIABETES MELLITUS WITH CHRONIC KIDNEY DISEASE, WITHOUT LONG-TERM CURRENT USE OF INSULIN, UNSPECIFIED CKD STAGE: ICD-10-CM

## 2017-10-30 DIAGNOSIS — E11.22 UNCONTROLLED TYPE 2 DIABETES MELLITUS WITH CHRONIC KIDNEY DISEASE, WITHOUT LONG-TERM CURRENT USE OF INSULIN, UNSPECIFIED CKD STAGE: ICD-10-CM

## 2017-10-30 PROCEDURE — 665999 HH PPS REVENUE DEBIT

## 2017-10-30 PROCEDURE — 665998 HH PPS REVENUE CREDIT

## 2017-10-30 PROCEDURE — 87086 URINE CULTURE/COLONY COUNT: CPT

## 2017-10-30 RX ORDER — METFORMIN HYDROCHLORIDE 500 MG/1
500 TABLET, EXTENDED RELEASE ORAL 2 TIMES DAILY
Qty: 60 TAB | Refills: 0 | Status: SHIPPED | OUTPATIENT
Start: 2017-10-30 | End: 2017-11-27 | Stop reason: SDUPTHER

## 2017-10-31 ENCOUNTER — HOME CARE VISIT (OUTPATIENT)
Dept: HOME HEALTH SERVICES | Facility: HOME HEALTHCARE | Age: 72
End: 2017-10-31
Payer: MEDICARE

## 2017-10-31 VITALS
RESPIRATION RATE: 18 BRPM | OXYGEN SATURATION: 98 % | TEMPERATURE: 97.4 F | DIASTOLIC BLOOD PRESSURE: 70 MMHG | HEART RATE: 69 BPM | BODY MASS INDEX: 25.84 KG/M2 | SYSTOLIC BLOOD PRESSURE: 130 MMHG | WEIGHT: 190.5 LBS

## 2017-10-31 VITALS
TEMPERATURE: 97.4 F | DIASTOLIC BLOOD PRESSURE: 70 MMHG | SYSTOLIC BLOOD PRESSURE: 130 MMHG | RESPIRATION RATE: 18 BRPM | HEART RATE: 69 BPM | OXYGEN SATURATION: 98 %

## 2017-10-31 VITALS
HEART RATE: 69 BPM | RESPIRATION RATE: 18 BRPM | TEMPERATURE: 97.4 F | DIASTOLIC BLOOD PRESSURE: 70 MMHG | OXYGEN SATURATION: 98 % | SYSTOLIC BLOOD PRESSURE: 130 MMHG

## 2017-10-31 PROCEDURE — G0156 HHCP-SVS OF AIDE,EA 15 MIN: HCPCS

## 2017-10-31 PROCEDURE — G0152 HHCP-SERV OF OT,EA 15 MIN: HCPCS

## 2017-10-31 PROCEDURE — G0157 HHC PT ASSISTANT EA 15: HCPCS

## 2017-10-31 PROCEDURE — 665998 HH PPS REVENUE CREDIT

## 2017-10-31 PROCEDURE — 665999 HH PPS REVENUE DEBIT

## 2017-10-31 ASSESSMENT — ENCOUNTER SYMPTOMS
POOR JUDGMENT: 1
DEPRESSED MOOD: 1
DEBILITATING PAIN: 1

## 2017-11-01 ENCOUNTER — HOME CARE VISIT (OUTPATIENT)
Dept: HOME HEALTH SERVICES | Facility: HOME HEALTHCARE | Age: 72
End: 2017-11-01
Payer: MEDICARE

## 2017-11-01 VITALS
DIASTOLIC BLOOD PRESSURE: 26 MMHG | TEMPERATURE: 97.2 F | SYSTOLIC BLOOD PRESSURE: 120 MMHG | HEART RATE: 62 BPM | RESPIRATION RATE: 18 BRPM

## 2017-11-01 VITALS
TEMPERATURE: 97.2 F | RESPIRATION RATE: 18 BRPM | HEART RATE: 62 BPM | OXYGEN SATURATION: 98 % | SYSTOLIC BLOOD PRESSURE: 120 MMHG | DIASTOLIC BLOOD PRESSURE: 76 MMHG

## 2017-11-01 LAB
BACTERIA UR CULT: NORMAL
SIGNIFICANT IND 70042: NORMAL
SOURCE SOURCE: NORMAL

## 2017-11-01 PROCEDURE — G0152 HHCP-SERV OF OT,EA 15 MIN: HCPCS

## 2017-11-01 PROCEDURE — 665999 HH PPS REVENUE DEBIT

## 2017-11-01 PROCEDURE — 665998 HH PPS REVENUE CREDIT

## 2017-11-01 PROCEDURE — G0153 HHCP-SVS OF S/L PATH,EA 15MN: HCPCS

## 2017-11-01 ASSESSMENT — ENCOUNTER SYMPTOMS
POOR JUDGMENT: 1
DIFFICULTY THINKING: 1
POOR JUDGMENT: 1
DEPRESSED MOOD: 1

## 2017-11-02 ENCOUNTER — HOSPITAL ENCOUNTER (OUTPATIENT)
Dept: RADIOLOGY | Facility: MEDICAL CENTER | Age: 72
End: 2017-11-02
Attending: INTERNAL MEDICINE
Payer: MEDICARE

## 2017-11-02 ENCOUNTER — HOME CARE VISIT (OUTPATIENT)
Dept: HOME HEALTH SERVICES | Facility: HOME HEALTHCARE | Age: 72
End: 2017-11-02
Payer: MEDICARE

## 2017-11-02 ENCOUNTER — OFFICE VISIT (OUTPATIENT)
Dept: CARDIOLOGY | Facility: MEDICAL CENTER | Age: 72
End: 2017-11-02
Payer: MEDICARE

## 2017-11-02 VITALS
DIASTOLIC BLOOD PRESSURE: 82 MMHG | SYSTOLIC BLOOD PRESSURE: 136 MMHG | WEIGHT: 191 LBS | BODY MASS INDEX: 25.87 KG/M2 | HEIGHT: 72 IN | HEART RATE: 62 BPM | OXYGEN SATURATION: 98 %

## 2017-11-02 VITALS
TEMPERATURE: 97.2 F | SYSTOLIC BLOOD PRESSURE: 128 MMHG | HEART RATE: 66 BPM | OXYGEN SATURATION: 98 % | DIASTOLIC BLOOD PRESSURE: 75 MMHG | RESPIRATION RATE: 18 BRPM

## 2017-11-02 DIAGNOSIS — R05.9 COUGH: ICD-10-CM

## 2017-11-02 DIAGNOSIS — E83.42 HYPOMAGNESEMIA: ICD-10-CM

## 2017-11-02 DIAGNOSIS — I10 ESSENTIAL HYPERTENSION, BENIGN: ICD-10-CM

## 2017-11-02 DIAGNOSIS — E87.6 HYPOKALEMIA: ICD-10-CM

## 2017-11-02 DIAGNOSIS — I25.10 ATHEROSCLEROSIS OF NATIVE CORONARY ARTERY OF NATIVE HEART WITHOUT ANGINA PECTORIS: ICD-10-CM

## 2017-11-02 DIAGNOSIS — Z95.5 PRESENCE OF DRUG COATED STENT IN LAD CORONARY ARTERY: ICD-10-CM

## 2017-11-02 DIAGNOSIS — Z98.61 S/P PTCA (PERCUTANEOUS TRANSLUMINAL CORONARY ANGIOPLASTY): ICD-10-CM

## 2017-11-02 DIAGNOSIS — Z95.5 PRESENCE OF DRUG COATED STENT IN LEFT CIRCUMFLEX CORONARY ARTERY: ICD-10-CM

## 2017-11-02 PROBLEM — I21.4 NSTEMI (NON-ST ELEVATED MYOCARDIAL INFARCTION) (HCC): Status: RESOLVED | Noted: 2017-07-18 | Resolved: 2017-11-02

## 2017-11-02 PROBLEM — R90.89 ABNORMAL BRAIN MRI: Status: ACTIVE | Noted: 2017-01-01

## 2017-11-02 PROBLEM — A41.9 SEPTIC SHOCK (HCC): Status: ACTIVE | Noted: 2017-05-20

## 2017-11-02 PROBLEM — A41.9 SEPTIC SHOCK (HCC): Status: RESOLVED | Noted: 2017-05-20 | Resolved: 2017-11-02

## 2017-11-02 PROBLEM — R65.21 SEPTIC SHOCK (HCC): Status: ACTIVE | Noted: 2017-05-20

## 2017-11-02 PROBLEM — J96.01 ACUTE RESPIRATORY FAILURE WITH HYPOXIA (HCC): Status: RESOLVED | Noted: 2017-05-20 | Resolved: 2017-11-02

## 2017-11-02 PROBLEM — A41.81 ENTEROCOCCAL SEPTICEMIA (HCC): Status: RESOLVED | Noted: 2017-08-12 | Resolved: 2017-11-02

## 2017-11-02 PROBLEM — I21.4 NSTEMI (NON-ST ELEVATED MYOCARDIAL INFARCTION) (HCC): Status: ACTIVE | Noted: 2017-07-18

## 2017-11-02 PROBLEM — C85.90 LYMPHOMA (HCC): Status: RESOLVED | Noted: 2017-02-19 | Resolved: 2017-11-02

## 2017-11-02 PROBLEM — D61.818 PANCYTOPENIA (HCC): Status: ACTIVE | Noted: 2017-05-20

## 2017-11-02 PROBLEM — N20.1 OBSTRUCTION OF LEFT URETEROPELVIC JUNCTION DUE TO STONE: Status: ACTIVE | Noted: 2017-07-18

## 2017-11-02 PROBLEM — C83.30 LYMPHOMA, LARGE-CELL, DIFFUSE (HCC): Status: ACTIVE | Noted: 2017-05-08

## 2017-11-02 PROBLEM — A41.81 ENTEROCOCCAL SEPTICEMIA (HCC): Status: ACTIVE | Noted: 2017-08-12

## 2017-11-02 PROBLEM — J96.01 ACUTE RESPIRATORY FAILURE WITH HYPOXIA (HCC): Status: ACTIVE | Noted: 2017-05-20

## 2017-11-02 PROBLEM — R65.21 SEPTIC SHOCK (HCC): Status: RESOLVED | Noted: 2017-05-20 | Resolved: 2017-11-02

## 2017-11-02 PROBLEM — I48.0 PAROXYSMAL ATRIAL FIBRILLATION (HCC): Status: ACTIVE | Noted: 2017-05-23

## 2017-11-02 PROCEDURE — 99213 OFFICE O/P EST LOW 20 MIN: CPT | Performed by: INTERNAL MEDICINE

## 2017-11-02 PROCEDURE — 665998 HH PPS REVENUE CREDIT

## 2017-11-02 PROCEDURE — 71020 DX-CHEST-2 VIEWS: CPT

## 2017-11-02 PROCEDURE — 665999 HH PPS REVENUE DEBIT

## 2017-11-02 PROCEDURE — G0157 HHC PT ASSISTANT EA 15: HCPCS

## 2017-11-02 RX ORDER — LOSARTAN POTASSIUM 50 MG/1
50 TABLET ORAL DAILY
Qty: 30 TAB | Refills: 6 | Status: SHIPPED | OUTPATIENT
Start: 2017-11-02 | End: 2018-05-14 | Stop reason: SDUPTHER

## 2017-11-02 RX ORDER — CALCIUM CARBONATE 300MG(750)
2 TABLET,CHEWABLE ORAL 3 TIMES DAILY
Qty: 180 TAB | Refills: 6 | Status: SHIPPED | OUTPATIENT
Start: 2017-11-02 | End: 2017-11-21

## 2017-11-02 RX ORDER — TRAMADOL HYDROCHLORIDE 50 MG/1
50 TABLET ORAL EVERY 4 HOURS PRN
COMMUNITY
End: 2018-02-01

## 2017-11-02 ASSESSMENT — ENCOUNTER SYMPTOMS
COUGH: 1
ORTHOPNEA: 0
PND: 0
HEMOPTYSIS: 0
SHORTNESS OF BREATH: 0
SPUTUM PRODUCTION: 0
WHEEZING: 0

## 2017-11-03 ENCOUNTER — HOME CARE VISIT (OUTPATIENT)
Dept: HOME HEALTH SERVICES | Facility: HOME HEALTHCARE | Age: 72
End: 2017-11-03
Payer: MEDICARE

## 2017-11-03 VITALS
DIASTOLIC BLOOD PRESSURE: 70 MMHG | TEMPERATURE: 96.3 F | SYSTOLIC BLOOD PRESSURE: 126 MMHG | OXYGEN SATURATION: 98 % | HEART RATE: 65 BPM | RESPIRATION RATE: 18 BRPM

## 2017-11-03 VITALS
DIASTOLIC BLOOD PRESSURE: 70 MMHG | TEMPERATURE: 96.3 F | HEART RATE: 65 BPM | OXYGEN SATURATION: 98 % | SYSTOLIC BLOOD PRESSURE: 126 MMHG | RESPIRATION RATE: 18 BRPM

## 2017-11-03 PROCEDURE — 665998 HH PPS REVENUE CREDIT

## 2017-11-03 PROCEDURE — G0495 RN CARE TRAIN/EDU IN HH: HCPCS

## 2017-11-03 PROCEDURE — 665999 HH PPS REVENUE DEBIT

## 2017-11-03 PROCEDURE — G0156 HHCP-SVS OF AIDE,EA 15 MIN: HCPCS

## 2017-11-03 ASSESSMENT — ENCOUNTER SYMPTOMS
POOR JUDGMENT: 1
DEPRESSED MOOD: 1

## 2017-11-03 NOTE — PROGRESS NOTES
Subjective:   Av Bob is a 72 y.o. male who presents today For follow-up of [facility] stays in [place]. He is now working with rehabilitation here. He had a non-ST elevation myocardial infarction about 3 months ago complicating acute sepsis and no further intervention was planned at that time although it was initially contemplated. A myocardial perfusion scan was done and we have been trying to get those results and I have requested them again today. He has not had any further angina and in fact didn't have angina at the time but he was critically ill then.  He is getting gradually stronger working with home health and rehabilitation.    Past Medical History:   Diagnosis Date   • Acute respiratory failure with hypoxia (CMS-HCC) 5/20/2017   • Cerebrovascular accident (CVA) (CMS-HCC) 12/30/2016    Left hemiparesis, etiology of stroke not established, lymphoma discovered on MRI evaluation of stroke   • CKD (chronic kidney disease) stage 3, GFR 30-59 ml/min    • Controlled gout 2014   • Coronary atherosclerosis of native coronary artery    • Enterococcal septicemia (CMS-HCC) 8/12/2017   • Hypertension    • Hypokalemia 2012    controlled with combination of ACE inhibitor or ARB plus spironolactone   • Leg wound, right 9/2016    Followed in wound care   • 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 Barry   • NSTEMI (non-ST elevated myocardial infarction) (CMS-HCC) 07/18/2017    complicating UTI with sepsis   • Polyneuropathy in diabetes(357.2) 9/11/2013   • Presence of drug coated stent in LAD coronary artery     Overlapping [place] 3.0 stents, 7/10/2009    • Presence of drug coated stent in left circumflex coronary artery     Overlapping 2.5mm [place] and [place] stents in OM, 7/10/2009    • S/P PTCA (percutaneous transluminal coronary angioplasty), RCA, 5/1997, patent 7/10/2009    •  Septic shock (CMS-HCC) 5/20/2017   • Skin ulcer of calf (CMS-HCC) 2015    Dr. Terry and wound care   • Type II or unspecified type diabetes mellitus with neurological manifestations, uncontrolled(250.62) 9/11/2013   • Wound of left leg 2012    Requiring surgery and debridment, Dr. Moore     Past Surgical History:   Procedure Laterality Date   • WOUND CLOSURE GENERAL  4/3/2012    Performed by GERHARD MOORE at [facility]   • ANGIOPLASTY  1997    RCA followed by other stents as noted above.    • CATARACT EXTRACTION WITH IOL      bilateral   • LITHOTRIPSY     • TONSILLECTOMY AND ADENOIDECTOMY     • TONSILLECTOMY AND ADENOIDECTOMY       Family History   Problem Relation Age of Onset   • Heart Disease Father      CAD   • Diabetes Father    • Cancer Mother    • Kidney stones Brother    • Heart Disease Brother      History   Smoking Status   • Never Smoker   Smokeless Tobacco   • Never Used     Allergies   Allergen Reactions   • Diphenhydramine Hcl Anxiety     Pt is able to tolerate  Mg benadryl with less anxiety   • Lorazepam Unspecified     Disorientation   • Ciprofloxacin      Rash,stomach ache   • Nkda [No Known Drug Allergy]      Outpatient Encounter Prescriptions as of 11/2/2017   Medication Sig Dispense Refill   • tramadol (ULTRAM) 50 MG Tab Take 50 mg by mouth every four hours as needed.     • losartan (COZAAR) 50 MG Tab Take 1 Tab by mouth every day. 30 Tab 6   • Magnesium 400 MG Tab Take 2 Tabs by mouth 3 times a day. 180 Tab 6   • metformin ER (GLUCOPHAGE XR) 500 MG TABLET SR 24 HR Take 1 Tab by mouth 2 times a day. 60 Tab 0   • spironolactone (ALDACTONE) 50 MG Tab Take 1 Tab by mouth every day. 90 Tab 3   • Insulin Glargine (TOUJEO SOLOSTAR SC) Inject 15 Units as instructed every evening. use 15 units nightly according to endocrenologist instruction on 3/23/17     • loperamide (IMODIUM A-D) 2 MG Cap Take 2 mg by mouth 4 times a day as needed for Diarrhea.     • aspirin 81 MG tablet  Take 81 mg by mouth every day.     • Acetaminophen 325 MG Cap Take 650 mg by mouth 4 times a day as needed (Pain).     • fluoxetine (PROZAC) 20 MG Cap Take 20 mg by mouth every day.     • metoprolol (TOPROL-XL) 200 MG XL tablet Take 1 Tab by mouth every day. 30 Tab 5   • insulin lispro, Human, (HUMALOG) 100 UNIT/ML Solution Pen-injector injection Using up to 50 units per day as directed. 45 mL 2   • nystatin (MYCOSTATIN) Powder Apply 1 Dose to affected area(s) 3 times a day as needed. groin rash     • ascorbic acid (VITAMIN C) 500 MG/5ML syrup Take 500 mg by mouth every day.     • atorvastatin (LIPITOR) 40 MG Tab Take 0.5 Tabs by mouth every day. 90 Tab 3   • Omega-3 Fatty Acids (OMEGA-3 FISH OIL PO) Take 1 Capsule by mouth every day.     • clopidogrel (PLAVIX) 75 MG Tab Take 1 Tab by mouth every day. 90 Tab 3   • allopurinol (ZYLOPRIM) 300 MG Tab Take 1 Tab by mouth every day. 90 Tab 3   • fenofibrate (TRICOR) 145 MG Tab Take 1 Tab by mouth every day. 90 Tab 3   • nitroglycerin (NITROSTAT) 0.4 MG SL Tab Place 1 Tab under tongue as needed for Chest Pain. 25 Tab 6   • B-D UF III MINI PEN NEEDLES 31G X 5 MM Misc USE AS DIRECTED WITH INSULIN INJECTIONS 450 Each 2   • CENTRUM SILVER PO Take 1 Tab by mouth every day.     • [DISCONTINUED] Magnesium 400 MG Tab Take 2 Tabs by mouth 3 times a day.     • [DISCONTINUED] lisinopril (PRINIVIL) 10 MG Tab Take 1 Tab by mouth every day. 90 Tab 3     No facility-administered encounter medications on file as of 11/2/2017.      Review of Systems   Respiratory: Positive for cough (possibly due to lisinopril). Negative for hemoptysis, sputum production, shortness of breath and wheezing.    Cardiovascular: Negative for orthopnea and PND.        Objective:   /82   Pulse 62   Ht 1.829 m (6')   Wt 86.6 kg (191 lb)   SpO2 98%   BMI 25.90 kg/m²     Physical Exam   Constitutional: He is oriented to person, place, and time. He appears well-developed and well-nourished.   He can only  walk short distances now but is apparently recovering. He uses a transport chair for any longer distance.   Eyes: Conjunctivae are normal. No scleral icterus.   Neck: No JVD present.   Cardiovascular: Normal rate, regular rhythm, normal heart sounds and intact distal pulses.  Exam reveals no gallop.    No murmur heard.  Pulmonary/Chest: Effort normal and breath sounds normal.   Musculoskeletal: He exhibits no edema.   Neurological: He is alert and oriented to person, place, and time.   Skin: Skin is warm and dry.   Psychiatric: He has a normal mood and affect. Thought content normal.       Assessment:     1. Atherosclerosis of native coronary artery of native heart without angina pectoris     2. S/P PTCA (percutaneous transluminal coronary angioplasty), RCA, 5/1997, patent 7/10/2009     3. Presence of drug coated stent in left circumflex coronary artery     4. Presence of drug coated stent in LAD coronary artery     5. Essential hypertension, benign  losartan (COZAAR) 50 MG Tab    BASIC METABOLIC PANEL   6. Hypomagnesemia  MAGNESIUM    Magnesium 400 MG Tab   7. Cough  DX-CHEST-2 VIEWS   8. Hypokalemia     Chest x-ray today done for the cough showed no acute cardiopulmonary abnormalities. Therefore this may be a cough due to lisinopril and we need to test that hypothesis. Coronary disease will need continued reevaluation.  Most recent lab 2 weeks ago were satisfactory. There will need reassessment as well.  Medical Decision Making:  Today's Assessment / Status / Plan:   Switched to losartan and follow-up in 2-3 weeks.  Try to get the records of the myocardial perfusion scan. Immediate reevaluation if any symptoms.  Use of emergency medical system reviewed.

## 2017-11-04 PROCEDURE — 665998 HH PPS REVENUE CREDIT

## 2017-11-04 PROCEDURE — 665999 HH PPS REVENUE DEBIT

## 2017-11-05 PROCEDURE — 665999 HH PPS REVENUE DEBIT

## 2017-11-05 PROCEDURE — 665998 HH PPS REVENUE CREDIT

## 2017-11-06 ENCOUNTER — HOME CARE VISIT (OUTPATIENT)
Dept: HOME HEALTH SERVICES | Facility: HOME HEALTHCARE | Age: 72
End: 2017-11-06
Payer: MEDICARE

## 2017-11-06 ENCOUNTER — HOSPITAL ENCOUNTER (OUTPATIENT)
Facility: MEDICAL CENTER | Age: 72
End: 2017-11-06
Attending: INTERNAL MEDICINE
Payer: MEDICARE

## 2017-11-06 VITALS
RESPIRATION RATE: 16 BRPM | SYSTOLIC BLOOD PRESSURE: 140 MMHG | DIASTOLIC BLOOD PRESSURE: 66 MMHG | HEART RATE: 64 BPM | TEMPERATURE: 96.9 F

## 2017-11-06 VITALS
OXYGEN SATURATION: 97 % | SYSTOLIC BLOOD PRESSURE: 140 MMHG | HEART RATE: 64 BPM | TEMPERATURE: 96.9 F | DIASTOLIC BLOOD PRESSURE: 66 MMHG | RESPIRATION RATE: 16 BRPM

## 2017-11-06 DIAGNOSIS — I10 ESSENTIAL HYPERTENSION, BENIGN: ICD-10-CM

## 2017-11-06 DIAGNOSIS — E83.42 HYPOMAGNESEMIA: ICD-10-CM

## 2017-11-06 LAB
ANION GAP SERPL CALC-SCNC: 9 MMOL/L (ref 0–11.9)
BUN SERPL-MCNC: 30 MG/DL (ref 8–22)
CALCIUM SERPL-MCNC: 9.4 MG/DL (ref 8.5–10.5)
CHLORIDE SERPL-SCNC: 103 MMOL/L (ref 96–112)
CO2 SERPL-SCNC: 25 MMOL/L (ref 20–33)
CREAT SERPL-MCNC: 1.26 MG/DL (ref 0.5–1.4)
GFR SERPL CREATININE-BSD FRML MDRD: 56 ML/MIN/1.73 M 2
GLUCOSE SERPL-MCNC: 169 MG/DL (ref 65–99)
MAGNESIUM SERPL-MCNC: 1.6 MG/DL (ref 1.5–2.5)
POTASSIUM SERPL-SCNC: 4.3 MMOL/L (ref 3.6–5.5)
SODIUM SERPL-SCNC: 137 MMOL/L (ref 135–145)

## 2017-11-06 PROCEDURE — A4215 STERILE NEEDLE: HCPCS

## 2017-11-06 PROCEDURE — 6650420 HCR  SYRINGE 12 ML

## 2017-11-06 PROCEDURE — G0156 HHCP-SVS OF AIDE,EA 15 MIN: HCPCS

## 2017-11-06 PROCEDURE — G0153 HHCP-SVS OF S/L PATH,EA 15MN: HCPCS

## 2017-11-06 PROCEDURE — G0299 HHS/HOSPICE OF RN EA 15 MIN: HCPCS

## 2017-11-06 PROCEDURE — 665998 HH PPS REVENUE CREDIT

## 2017-11-06 PROCEDURE — 665999 HH PPS REVENUE DEBIT

## 2017-11-06 PROCEDURE — 80048 BASIC METABOLIC PNL TOTAL CA: CPT

## 2017-11-06 PROCEDURE — 83735 ASSAY OF MAGNESIUM: CPT

## 2017-11-06 ASSESSMENT — ENCOUNTER SYMPTOMS: DIFFICULTY THINKING: 1

## 2017-11-07 ENCOUNTER — TELEPHONE (OUTPATIENT)
Dept: CARDIOLOGY | Facility: MEDICAL CENTER | Age: 72
End: 2017-11-07

## 2017-11-07 ENCOUNTER — OFFICE VISIT (OUTPATIENT)
Dept: MEDICAL GROUP | Facility: MEDICAL CENTER | Age: 72
End: 2017-11-07
Payer: MEDICARE

## 2017-11-07 ENCOUNTER — HOME CARE VISIT (OUTPATIENT)
Dept: HOME HEALTH SERVICES | Facility: HOME HEALTHCARE | Age: 72
End: 2017-11-07
Payer: MEDICARE

## 2017-11-07 VITALS
RESPIRATION RATE: 18 BRPM | DIASTOLIC BLOOD PRESSURE: 78 MMHG | HEART RATE: 62 BPM | OXYGEN SATURATION: 99 % | SYSTOLIC BLOOD PRESSURE: 125 MMHG | TEMPERATURE: 97.5 F

## 2017-11-07 VITALS
OXYGEN SATURATION: 99 % | SYSTOLIC BLOOD PRESSURE: 122 MMHG | DIASTOLIC BLOOD PRESSURE: 64 MMHG | RESPIRATION RATE: 16 BRPM | TEMPERATURE: 97.8 F | HEART RATE: 76 BPM

## 2017-11-07 VITALS
OXYGEN SATURATION: 97 % | SYSTOLIC BLOOD PRESSURE: 140 MMHG | HEART RATE: 64 BPM | TEMPERATURE: 96.9 F | RESPIRATION RATE: 20 BRPM | DIASTOLIC BLOOD PRESSURE: 60 MMHG

## 2017-11-07 DIAGNOSIS — Z99.3 WHEELCHAIR DEPENDENT: ICD-10-CM

## 2017-11-07 DIAGNOSIS — E11.9 CONTROLLED TYPE 2 DIABETES MELLITUS WITHOUT COMPLICATION, WITHOUT LONG-TERM CURRENT USE OF INSULIN (HCC): ICD-10-CM

## 2017-11-07 DIAGNOSIS — Z86.73 HISTORY OF ARTERIAL ISCHEMIC STROKE: ICD-10-CM

## 2017-11-07 DIAGNOSIS — F33.1 MODERATE EPISODE OF RECURRENT MAJOR DEPRESSIVE DISORDER (HCC): ICD-10-CM

## 2017-11-07 DIAGNOSIS — C83.30 DIFFUSE LARGE CELL NON-HODGKIN'S LYMPHOMA (HCC): ICD-10-CM

## 2017-11-07 PROBLEM — R90.89 ABNORMAL BRAIN MRI: Status: RESOLVED | Noted: 2017-01-01 | Resolved: 2017-11-07

## 2017-11-07 PROBLEM — E83.42 HYPOMAGNESEMIA: Status: RESOLVED | Noted: 2017-08-12 | Resolved: 2017-11-07

## 2017-11-07 PROBLEM — D50.9 NORMOCYTIC HYPOCHROMIC ANEMIA: Status: ACTIVE | Noted: 2017-05-20

## 2017-11-07 PROCEDURE — G0152 HHCP-SERV OF OT,EA 15 MIN: HCPCS

## 2017-11-07 PROCEDURE — 665998 HH PPS REVENUE CREDIT

## 2017-11-07 PROCEDURE — 99204 OFFICE O/P NEW MOD 45 MIN: CPT | Performed by: FAMILY MEDICINE

## 2017-11-07 PROCEDURE — 665999 HH PPS REVENUE DEBIT

## 2017-11-07 ASSESSMENT — ENCOUNTER SYMPTOMS
POOR JUDGMENT: 1
POOR JUDGMENT: 1
DEPRESSED MOOD: 1

## 2017-11-07 NOTE — LETTER
Inkvite  Mitchell Pantoja M.D.  [address]  [place] [address]  Fax: [phone]   Authorization for Release/Disclosure of   Protected Health Information   Name: AV MADRIGAL : 1945 SSN: [id number]   Address: [address]  [place] [address] Phone:    [phone] (home)    I authorize the entity listed below to release/disclose the PHI below to:   [facility]/Mitchell Pantoja M.D. and Mitchell Pantoja M.D.   Provider or Entity Name:  Dr. Alejandro Winters    Address   City, State, Zip   Phone:  [phone]    Fax:     Reason for request: continuity of care   Information to be released:    [  ] LAST COLONOSCOPY,  including any PATH REPORT and follow-up  [  ] LAST FIT/COLOGUARD RESULT [  ] LAST DEXA  [  ] LAST MAMMOGRAM  [  ] LAST PAP  [  ] LAST LABS [  ] RETINA EXAM REPORT  [  ] IMMUNIZATION RECORDS  [  ] Release all info      [  ] Check here and initial the line next to each item to release ALL health information INCLUDING  _____ Care and treatment for drug and / or alcohol abuse  _____ HIV testing, infection status, or AIDS  _____ Genetic Testing    DATES OF SERVICE OR TIME PERIOD TO BE DISCLOSED: _____________  I understand and acknowledge that:  * This Authorization may be revoked at any time by you in writing, except if your health information has already been used or disclosed.  * Your health information that will be used or disclosed as a result of you signing this authorization could be re-disclosed by the recipient. If this occurs, your re-disclosed health information may no longer be protected by State or Federal laws.  * You may refuse to sign this Authorization. Your refusal will not affect your ability to obtain treatment.  * This Authorization becomes effective upon signing and will  on (date) __________.      If no date is indicated, this Authorization will  one (1) year from the signature date.    Name: Av Alejandro  Lisbeth    Signature:   Date:     11/7/2017       PLEASE FAX REQUESTED RECORDS BACK TO: [phone]

## 2017-11-07 NOTE — PATIENT INSTRUCTIONS
"1) Please take Vitamin D3 6,000IU daily or 5,000IU daily for the next 2 weeks    2) Melatonin up to 10mg nightly, can start off as 3 or 5mg, also see sleep recommendations as below    3) Dr. Pantoja's tips for \"Lifestyle Medicine:\"     Check out the talk/documentary on \"How not to die\" by Dr. Alejandro Mcguire (on his website [email or web address], he also authored a book with this title).       1) Make SMART lifestyle changes: Specific, Measurable, Attainable, Relevant, Time-sensitive.  The lifestyle changes that you need to make are with regards to: nutrition, cardiovascular exercise, sleep, stress management.  Make these changes every 2 weeks, revisiting the previous goals and perhaps revising them and/or setting new ones.       2) Nutrition: Make as many changes as you can to increase the amount of whole-foods (not Whole Foods, necessarily!  ;-)), plant-based diet as possible:   A) Books: Eat to Live (Dr. Hernán York), The Spectrum (Dr. Basim Barrios), The Starch Solution (Dr. Larry Kent)      B) Documentaries (can usually be found on [employer]): Bieber Over Knives.  Fat, Sick, and Nearly Dead.  Fed Up.           3) Cardiovascular Exercise: The center for disease control recommends a minimum of 150 minutes per week of moderate intensity cardiovascular exercise for weight maintenance and cardiovascular health.  Set this as your initial goal, with at least 30 minutes per session. Types of exercise can include 30 minutes of elliptical, 30 minutes of decently fast jog, 30 minutes of swimming, 30 minutes of heavy gardening (lifting big bags of fertilizer, digging deep holes/ditches).  He can cut down the minute requirements to half, by doing higher intensity sports such as a game of tennis, or soccer.  He notes the library and check out with they have for home exercise programs, as well.       4) Sleep:    A) Goal: Obtain a minimum of 7-8hours of continuous, uninterrupted, restful sleep per night.    B) Tips for Sleep " Hygiene:    I) Go to bed and wake up at consistent times whether work/school day or not.     II) Keep room dark, quiet, and comfortable.  Increase exposure to sunlight during awake times and avoid bright lights (especially anything with a backlight) at least the last 1-2hours before going to sleep.     III) Don't nap.     IV) Avoid stimulant or caffeine use more than 4 hours after wake time.        5) Stress Management: You cannot change the stresses of life dizziness necessarily, but you can change how he responds of them. One good way to manage stress is to write things down in order to help you process how to approach things in general or specifically. Another good way is to talk it out with someone you trusts, specifically your significant other or good friend. A definite great way to deal with stress is to have cardiovascular exercise!

## 2017-11-08 ENCOUNTER — HOME CARE VISIT (OUTPATIENT)
Dept: HOME HEALTH SERVICES | Facility: HOME HEALTHCARE | Age: 72
End: 2017-11-08
Payer: MEDICARE

## 2017-11-08 VITALS
SYSTOLIC BLOOD PRESSURE: 125 MMHG | RESPIRATION RATE: 18 BRPM | HEART RATE: 58 BPM | TEMPERATURE: 98.1 F | DIASTOLIC BLOOD PRESSURE: 80 MMHG

## 2017-11-08 VITALS
SYSTOLIC BLOOD PRESSURE: 125 MMHG | HEART RATE: 58 BPM | DIASTOLIC BLOOD PRESSURE: 80 MMHG | OXYGEN SATURATION: 97 % | TEMPERATURE: 98.1 F | RESPIRATION RATE: 18 BRPM

## 2017-11-08 PROCEDURE — G0157 HHC PT ASSISTANT EA 15: HCPCS

## 2017-11-08 PROCEDURE — 665999 HH PPS REVENUE DEBIT

## 2017-11-08 PROCEDURE — G0153 HHCP-SVS OF S/L PATH,EA 15MN: HCPCS

## 2017-11-08 PROCEDURE — 665998 HH PPS REVENUE CREDIT

## 2017-11-08 ASSESSMENT — ENCOUNTER SYMPTOMS
POOR JUDGMENT: 1
DIFFICULTY THINKING: 1

## 2017-11-09 ENCOUNTER — HOME CARE VISIT (OUTPATIENT)
Dept: HOME HEALTH SERVICES | Facility: HOME HEALTHCARE | Age: 72
End: 2017-11-09
Payer: MEDICARE

## 2017-11-09 VITALS
OXYGEN SATURATION: 99 % | TEMPERATURE: 98.7 F | DIASTOLIC BLOOD PRESSURE: 78 MMHG | HEART RATE: 73 BPM | RESPIRATION RATE: 18 BRPM | SYSTOLIC BLOOD PRESSURE: 120 MMHG

## 2017-11-09 PROCEDURE — 665999 HH PPS REVENUE DEBIT

## 2017-11-09 PROCEDURE — G0152 HHCP-SERV OF OT,EA 15 MIN: HCPCS

## 2017-11-09 PROCEDURE — 665998 HH PPS REVENUE CREDIT

## 2017-11-09 ASSESSMENT — ENCOUNTER SYMPTOMS
DIFFICULTY THINKING: 1
DEPRESSED MOOD: 1

## 2017-11-10 PROCEDURE — 665998 HH PPS REVENUE CREDIT

## 2017-11-10 PROCEDURE — 665999 HH PPS REVENUE DEBIT

## 2017-11-10 NOTE — ASSESSMENT & PLAN NOTE
Patient diagnosed with diffuse large cell non-Hodgkin's lymphoma, recently moved to [place], [place], and is requesting referral to [facility], with whom he apparently has an appointment within the next few weeks.

## 2017-11-10 NOTE — ASSESSMENT & PLAN NOTE
Patient and wife state that he is more sullen, depressed compared to before, more noticeable by his wife and patient's therapists (PT< OT< Speech therapy).  He was thus started on Prozac 20mg PO Qday, which improved his symptoms, but wife states that he is still symptomatic.    SIG E CAPS:      Positives: Sleep poor, Interest decreased, Concentration decreased, Psychomotor Activity Change (lowered) and Depressed Mood present, decreased energy     Negatives: Guilt , Appetite  and Suicidal Ideation

## 2017-11-11 PROCEDURE — 665998 HH PPS REVENUE CREDIT

## 2017-11-11 PROCEDURE — 665999 HH PPS REVENUE DEBIT

## 2017-11-12 PROCEDURE — 665999 HH PPS REVENUE DEBIT

## 2017-11-12 PROCEDURE — 665998 HH PPS REVENUE CREDIT

## 2017-11-13 ENCOUNTER — HOME CARE VISIT (OUTPATIENT)
Dept: HOME HEALTH SERVICES | Facility: HOME HEALTHCARE | Age: 72
End: 2017-11-13
Payer: MEDICARE

## 2017-11-13 VITALS
HEART RATE: 62 BPM | TEMPERATURE: 97.8 F | DIASTOLIC BLOOD PRESSURE: 80 MMHG | OXYGEN SATURATION: 98 % | SYSTOLIC BLOOD PRESSURE: 130 MMHG | RESPIRATION RATE: 18 BRPM

## 2017-11-13 DIAGNOSIS — E78.2 MIXED HYPERLIPIDEMIA: ICD-10-CM

## 2017-11-13 PROCEDURE — G0495 RN CARE TRAIN/EDU IN HH: HCPCS

## 2017-11-13 PROCEDURE — G0153 HHCP-SVS OF S/L PATH,EA 15MN: HCPCS

## 2017-11-13 PROCEDURE — 665999 HH PPS REVENUE DEBIT

## 2017-11-13 PROCEDURE — 665998 HH PPS REVENUE CREDIT

## 2017-11-13 PROCEDURE — G0151 HHCP-SERV OF PT,EA 15 MIN: HCPCS

## 2017-11-13 RX ORDER — FENOFIBRATE 145 MG/1
145 TABLET, COATED ORAL DAILY
Qty: 90 TAB | Refills: 3 | OUTPATIENT
Start: 2017-11-13 | End: 2018-10-31 | Stop reason: SDUPTHER

## 2017-11-13 SDOH — ECONOMIC STABILITY: HOUSING INSECURITY: UNSAFE APPLIANCES: 0

## 2017-11-13 SDOH — ECONOMIC STABILITY: HOUSING INSECURITY: UNSAFE COOKING RANGE AREA: 0

## 2017-11-13 NOTE — ASSESSMENT & PLAN NOTE
Patient had CVA in 12/2016 with residual L sided hemiparesis/weakness, can be wheelchair dependent at times due to tiredness/weakness from hemiparesis.  Patient continues with home PT/OT/Speech Therapy.  They are asking to have him transitioned to outpatient PT/OT/Speech Therapy.  Please see notes from same date of service 11/07/17 re: depression.

## 2017-11-13 NOTE — PROGRESS NOTES
Subjective:   Chief Complaint/History of Present Illness:  Av Bob is a 72 y.o. male patient new to [facility] who presents today to establish primary medical care and discuss the medical conditions as listed below.  PMH, PSH, Social History, Medications, Allergies all reviewed as documented:    Diffuse large cell non-Hodgkin's lymphoma (CMS-HCC)  Patient diagnosed with diffuse large cell non-Hodgkin's lymphoma, recently moved to [place], [place], and is requesting referral to [facility], with whom he apparently has an appointment within the next few weeks.      Moderate episode of recurrent major depressive disorder (CMS-HCC)  Patient and wife state that he is more sullen, depressed compared to before, more noticeable by his wife and patient's therapists (PT< OT< Speech therapy).  He was thus started on Prozac 20mg PO Qday, which improved his symptoms, but wife states that he is still symptomatic.    SIG E CAPS:      Positives: Sleep poor, Interest decreased, Concentration decreased, Psychomotor Activity Change (lowered) and Depressed Mood present, decreased energy     Negatives: Guilt , Appetite  and Suicidal Ideation     History of arterial ischemic stroke  Patient had CVA in 12/2016 with residual L sided hemiparesis/weakness, can be wheelchair dependent at times due to tiredness/weakness from hemiparesis.  Patient continues with home PT/OT/Speech Therapy.  They are asking to have him transitioned to outpatient PT/OT/Speech Therapy.  Please see notes from same date of service 11/07/17 re: depression.    Wheelchair dependent  Please see notes from same date of service 11/07/17 re: History of arterial ischemic stroke.    Controlled type 2 diabetes mellitus without complication, without long-term current use of insulin (CMS-HCC)  We discussed that patient is diagnosed with diabetes nearly well-controlled given that the A1c is:   Lab Results   Component Value Date/Time    HBA1C 7.1 10/11/2017 08:41  AM        We discussed that prediabetes/diabetes is a medical condition of lifestyle habits (less than optimal dietary choices, insufficient cardiovascular exercise). Furthermore, we discussed that at present time it is better to pursue lifestyle changes rather than changing medications. Patient is  in agreement. Please see review of systems as below.    Patient needs diabetic health maintenance topics updated.  See below for more details.    ROS is NEGATIVE for blurred vision, polydipsia, polyuria, diaphoresis, palpitations, fatigue, irritability, flank pain, BLE paresthesias.      Patient Active Problem List    Diagnosis Date Noted   • Moderate episode of recurrent major depressive disorder (CMS-HCC) 11/07/2017   • Wheelchair dependent 11/07/2017   • Obstruction of left ureteropelvic junction due to stone 07/18/2017   • Paroxysmal atrial fibrillation (CMS-HCC) 05/23/2017   • Normocytic hypochromic anemia 05/20/2017   • Diffuse large cell non-Hodgkin's lymphoma (CMS-HCC) 05/08/2017   • Essential hypertension, benign 03/22/2017   • History of arterial ischemic stroke 03/22/2017   • Controlled type 2 diabetes mellitus without complication, without long-term current use of insulin (CMS-HCC) 12/30/2016   • CKD (chronic kidney disease) stage 3, GFR 30-59 ml/min 08/25/2016   • History of myocardial infarction 05/07/2014   • Atherosclerosis of native coronary artery of native heart    • Idiopathic chronic gout of multiple sites without tophus 01/28/2014   • Diabetic polyneuropathy (CMS-HCC) 09/11/2013   • History of nephrolithiasis    • Presence of drug coated stent in LAD coronary artery 12/13/2011   • Presence of drug coated stent in left circumflex coronary artery 12/13/2011   • S/P PTCA (percutaneous transluminal coronary angioplasty), RCA, 5/1997, patent 7/10/2009 12/13/2011   • Mixed hyperlipidemia 12/13/2011   • Benign hypertensive heart disease without heart failure 12/13/2011       Additional  History:   Allergies:    Diphenhydramine hcl; Lorazepam; Ciprofloxacin; and Nkda [no known drug allergy]     Medications:     Current Outpatient Prescriptions Ordered in [facility]   Medication Sig Dispense Refill   • fluoxetine (PROZAC) 20 MG Cap Take 20 mg by mouth every day.     • Omega-3 Fatty Acids (OMEGA-3 FISH OIL PO) Take 2 Capsule by mouth 3 times a day. Supposed to be for two weeks and then will adjust as needed.     • tramadol (ULTRAM) 50 MG Tab Take 50 mg by mouth every four hours as needed.     • losartan (COZAAR) 50 MG Tab Take 1 Tab by mouth every day. 30 Tab 6   • Magnesium 400 MG Tab Take 2 Tabs by mouth 3 times a day. 180 Tab 6   • metformin ER (GLUCOPHAGE XR) 500 MG TABLET SR 24 HR Take 1 Tab by mouth 2 times a day. 60 Tab 0   • spironolactone (ALDACTONE) 50 MG Tab Take 1 Tab by mouth every day. 90 Tab 3   • Insulin Glargine (TOUJEO SOLOSTAR SC) Inject 15 Units as instructed every evening. use 15 units nightly according to endocrenologist instruction on 3/23/17     • loperamide (IMODIUM A-D) 2 MG Cap Take 2 mg by mouth 4 times a day as needed for Diarrhea.     • aspirin 81 MG tablet Take 81 mg by mouth every day.     • Acetaminophen 325 MG Cap Take 650 mg by mouth 4 times a day as needed (Pain).     • metoprolol (TOPROL-XL) 200 MG XL tablet Take 1 Tab by mouth every day. 30 Tab 5   • insulin lispro, Human, (HUMALOG) 100 UNIT/ML Solution Pen-injector injection Using up to 50 units per day as directed. 45 mL 2   • nystatin (MYCOSTATIN) Powder Apply 1 Dose to affected area(s) 3 times a day as needed. groin rash     • ascorbic acid (VITAMIN C) 500 MG/5ML syrup Take 500 mg by mouth every day.     • atorvastatin (LIPITOR) 40 MG Tab Take 0.5 Tabs by mouth every day. 90 Tab 3   • clopidogrel (PLAVIX) 75 MG Tab Take 1 Tab by mouth every day. 90 Tab 3   • allopurinol (ZYLOPRIM) 300 MG Tab Take 1 Tab by mouth every day. 90 Tab 3   • fenofibrate (TRICOR) 145 MG Tab Take 1 Tab by mouth every day. 90 Tab 3   •  nitroglycerin (NITROSTAT) 0.4 MG SL Tab Place 1 Tab under tongue as needed for Chest Pain. 25 Tab 6   • B-D UF III MINI PEN NEEDLES 31G X 5 MM Misc USE AS DIRECTED WITH INSULIN INJECTIONS 450 Each 2   • CENTRUM SILVER PO Take 1 Tab by mouth every day.       No current [facility]-ordered facility-administered medications on file.         Past Medical History:     Past Medical History:   Diagnosis Date   • Acute respiratory failure with hypoxia (CMS-HCC) 5/20/2017   • Cerebrovascular accident (CVA) (CMS-HCC) 12/30/2016    Left hemiparesis, etiology of stroke not established, lymphoma discovered on MRI evaluation of stroke   • CKD (chronic kidney disease) stage 3, GFR 30-59 ml/min    • Controlled gout 2014   • Coronary atherosclerosis of native coronary artery    • Enterococcal septicemia (CMS-HCC) 8/12/2017   • Hypertension    • Hypokalemia 2012    controlled with combination of ACE inhibitor or ARB plus spironolactone   • Leg wound, right 9/2016    Followed in wound care   • 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 Barry   • NSTEMI (non-ST elevated myocardial infarction) (CMS-HCC) 07/18/2017    complicating UTI with sepsis   • Polyneuropathy in diabetes(357.2) 9/11/2013   • Presence of drug coated stent in LAD coronary artery     Overlapping [place] 3.0 stents, 7/10/2009    • Presence of drug coated stent in left circumflex coronary artery     Overlapping 2.5mm Taxus and [place] stents in OM, 7/10/2009    • S/P PTCA (percutaneous transluminal coronary angioplasty), RCA, 5/1997, patent 7/10/2009    • Septic shock (CMS-HCC) 5/20/2017   • Skin ulcer of calf (CMS-HCC) 2015    Dr. Terry and wound care   • Type II or unspecified type diabetes mellitus with neurological manifestations, uncontrolled(250.62) 9/11/2013   • Wound of left leg 2012    Requiring surgery and debridment, Dr. Moore        Past Surgical History:     Past  Surgical History:   Procedure Laterality Date   • WOUND CLOSURE GENERAL  4/3/2012    Performed by GERHARD GILBERT at SURGERY SAME DAY [facility]   • ANGIOPLASTY  1997    RCA followed by other stents as noted above.    • CATARACT EXTRACTION WITH IOL      bilateral   • LITHOTRIPSY     • TONSILLECTOMY AND ADENOIDECTOMY     • TONSILLECTOMY AND ADENOIDECTOMY          Social History:     Social History   Substance Use Topics   • Smoking status: Never Smoker   • Smokeless tobacco: Never Used   • Alcohol use No        Family History:     Family Status   Relation Status   • Father    • Mother Alive   • Brother Alive   • Maternal Uncle Alive        Family History   Problem Relation Age of Onset   • Heart Disease Father      CAD   • Diabetes Father    • Cancer Mother    • Kidney stones Brother    • Heart Disease Brother        ROS:     - Constitutional: Negative for fever, chills, unexpected weight change, and fatigue/generalized weakness.     - Respiratory: Negative for cough, sputum production, chest congestion, dyspnea, wheezing, and crackles.      - Cardiovascular: Negative for chest pain, palpitations, orthopnea, and bilateral lower extremity edema.     - Gastrointestinal: Negative for heartburn, nausea, vomiting, abdominal pain, hematochezia, melena, diarrhea, constipation, and greasy/foul-smelling stools.     - Genitourinary: Negative for dysuria, polyuria, hematuria, pyuria, urinary urgency, and urinary incontinence.    - Musculoskeletal: Negative for myalgias, back pain, and joint pain.     - Skin: Negative for rash, itching, cyanotic skin color change.     - NOTE: All other systems reviewed and are negative, except as in HPI.     Objective:   Physical Exam:    Vitals: Blood pressure 122/64, pulse 76, temperature 36.6 °C (97.8 °F), resp. rate 16, SpO2 99 %.   BMI: There is no height or weight on file to calculate BMI.   General/Constitutional: Vitals as above, Well nourished, well developed male in no  acute distress   Head/Eyes:  - Head is grossly normal & atraumatic  - Bilateral conjunctivae clear and not injected, bilateral EOMI, bilateral PERRL   ENT:   - Bilateral external ears grossly normal in appearance, external auditory canals clear & bilateral TMs visualized with appropriate cone of light reflex, hearing grossly intact  - External nares normal in appearance and without discharge/bleeding, bilateral turbinates non-erythematous/non-edematous and without discharge/bleeding  - Good dentition & no palpable fluctuance of gums,  posterior oropharynx without erythema/edema/exudates  Neck: Neck supple, no masses, neck non-tender to palpation, no thyromegaly/goiter   Respiratory: No respiratory distress, bilateral lungs are clear to auscultation in all lung fields (anterior/lateral/posterior), no wheezing/rhonchi/rales   Cardiovascular: Regular rate and rhythm without murmur/gallops/rubs, distal pulses equal and 2+ bilaterally (radial, posterior tibial), no bilateral lower extremity edema   Gastrointestinal: Abdomen resonant to percussion, Bowel sounds present in all 4 quadrants, abdomen non-tender to light and deep palpation   MSK: Gait grossly normal & not antalgic, no tenderness to percussion of vertebral processes, no CVAT, no bilateral SI joint tenderness   Integumentary: No apparent rashes   Neuro: Gross motor movement intact in all 4 extremities but fine motor movements impaired on LUE and LLE, Strength testing as follows (5/5 on RUE and RLE, 4/5 on LUE< and 3+/5 on LLE),  Gross sensation intact to extremities and trunk, Gait grossly normal and not antalgic   Psych: Judgment grossly appropriate, no apparent depression/anxiety    Health Maintenance:      - Diabetic health topics need to be addressed as follows:   -- Monofilament at next clinic visit   -- Urine microalbumin to creatinine ratio ordered   -- Retinal exam records requested     - Colonoscopy records requested     - Vaccination records requested  (PCV13, PPSV23, as well as others)    Imaging/Labs:      - I have requested previous records, and will update accordingly.     Assessment and Plan:   1. Diffuse large cell non-Hodgkin's lymphoma (CMS-HCC)  Unknown control, appears to be in remission per patient's symptoms.  Request previous records + referral to Oncology ([facility])   - REFERRAL TO HEMATOLOGY ONCOLOGY Referral to? [facility]    2. Moderate episode of recurrent major depressive disorder (CMS-HCC)  Uncontrolled, continue Prozac as directed.  We discussed differential diagnosis including Seasonal Affective Disorder, insufficient pharmacotherapy for MDD, as well as options including vitamin D supplement, increasing Prozac, referral to Psychology or Psychiatry.  We agreed on Vitamin D high dose OTC therapy for now (5,000 IU or 6,000 IU by mouth daily).    3. History of arterial ischemic stroke  4. Wheelchair dependent  Uncontrolled sequelae of CVA, but improving.  Referral to outpatient PT/OT/Speech Therapy for continuity of care.   - REFERRAL TO PHYSICAL THERAPY Reason for Therapy: Eval/Treat/Report   - REFERRAL TO OCCUPATIONAL THERAPY Reason for Therapy: Eval/Treat/Report   - REFERRAL TO SPEECH THERAPY Reason for Therapy: Eval/Treat/Report    5. Controlled type 2 diabetes mellitus without complication, without long-term current use of insulin (CMS-[facility])  Uncontrolled but nearly well-controlled.     A) Will update diabetic health maintenance topics as listed above    - MICROALBUMIN CREAT RATIO URINE (LAB COLLECT); Future   B) Lifestyle changes education: Patient and I discussed the importance of lifestyle changes, with particular emphasis on plant-based nutrition (for the purposes of weight loss, general health, DM2), as well as cardiovascular exercise, proper sleep, and stress management.  This discussion is briefly summarized in the patient instruction section below.  Patient verbalized understanding.    NOTE: A total of  40minutes was spent in direct face-to-face time with the patient, of which over 50% of the time was spent in counseling and/or coordination of care, the contents of which are described in this note.    RTC: in 2 weeks for follow-up on Depression, diabetes, lifestyle changes, and records review.    PLEASE NOTE: This dictation was created using voice recognition software. I have made every reasonable attempt to correct obvious errors, but I expect that there are errors of grammar and possibly content that I did not discover before finalizing the note.

## 2017-11-13 NOTE — ASSESSMENT & PLAN NOTE
We discussed that patient is diagnosed with diabetes nearly well-controlled given that the A1c is:   Lab Results   Component Value Date/Time    HBA1C 7.1 10/11/2017 08:41 AM        We discussed that prediabetes/diabetes is a medical condition of lifestyle habits (less than optimal dietary choices, insufficient cardiovascular exercise). Furthermore, we discussed that at present time it is better to pursue lifestyle changes rather than changing medications. Patient is  in agreement. Please see review of systems as below.    Patient needs diabetic health maintenance topics updated.  See below for more details.    ROS is NEGATIVE for blurred vision, polydipsia, polyuria, diaphoresis, palpitations, fatigue, irritability, flank pain, BLE paresthesias.

## 2017-11-14 ENCOUNTER — HOME CARE VISIT (OUTPATIENT)
Dept: HOME HEALTH SERVICES | Facility: HOME HEALTHCARE | Age: 72
End: 2017-11-14
Payer: MEDICARE

## 2017-11-14 ENCOUNTER — HOSPITAL ENCOUNTER (OUTPATIENT)
Facility: MEDICAL CENTER | Age: 72
End: 2017-11-14
Attending: FAMILY MEDICINE
Payer: MEDICARE

## 2017-11-14 VITALS
DIASTOLIC BLOOD PRESSURE: 80 MMHG | SYSTOLIC BLOOD PRESSURE: 130 MMHG | TEMPERATURE: 97.8 F | RESPIRATION RATE: 18 BRPM | HEART RATE: 62 BPM

## 2017-11-14 VITALS
SYSTOLIC BLOOD PRESSURE: 120 MMHG | DIASTOLIC BLOOD PRESSURE: 70 MMHG | RESPIRATION RATE: 20 BRPM | OXYGEN SATURATION: 96 % | TEMPERATURE: 96.9 F | HEART RATE: 58 BPM

## 2017-11-14 DIAGNOSIS — E11.9 CONTROLLED TYPE 2 DIABETES MELLITUS WITHOUT COMPLICATION, WITHOUT LONG-TERM CURRENT USE OF INSULIN (HCC): ICD-10-CM

## 2017-11-14 LAB
CREAT UR-MCNC: 69.7 MG/DL
MICROALBUMIN UR-MCNC: 10.3 MG/DL
MICROALBUMIN/CREAT UR: 148 MG/G (ref 0–30)

## 2017-11-14 PROCEDURE — 82043 UR ALBUMIN QUANTITATIVE: CPT

## 2017-11-14 PROCEDURE — 82570 ASSAY OF URINE CREATININE: CPT

## 2017-11-14 PROCEDURE — 665998 HH PPS REVENUE CREDIT

## 2017-11-14 PROCEDURE — 665999 HH PPS REVENUE DEBIT

## 2017-11-14 PROCEDURE — G0152 HHCP-SERV OF OT,EA 15 MIN: HCPCS

## 2017-11-14 ASSESSMENT — ENCOUNTER SYMPTOMS
DEPRESSED MOOD: 1
DIFFICULTY THINKING: 1
POOR JUDGMENT: 1
DIFFICULTY THINKING: 1

## 2017-11-15 ENCOUNTER — HOME CARE VISIT (OUTPATIENT)
Dept: HOME HEALTH SERVICES | Facility: HOME HEALTHCARE | Age: 72
End: 2017-11-15
Payer: MEDICARE

## 2017-11-15 VITALS
OXYGEN SATURATION: 98 % | HEART RATE: 60 BPM | DIASTOLIC BLOOD PRESSURE: 80 MMHG | RESPIRATION RATE: 18 BRPM | TEMPERATURE: 97.8 F | SYSTOLIC BLOOD PRESSURE: 123 MMHG

## 2017-11-15 VITALS
SYSTOLIC BLOOD PRESSURE: 110 MMHG | DIASTOLIC BLOOD PRESSURE: 60 MMHG | HEART RATE: 64 BPM | RESPIRATION RATE: 16 BRPM | TEMPERATURE: 97.7 F

## 2017-11-15 PROCEDURE — 665998 HH PPS REVENUE CREDIT

## 2017-11-15 PROCEDURE — G0153 HHCP-SVS OF S/L PATH,EA 15MN: HCPCS

## 2017-11-15 PROCEDURE — 665999 HH PPS REVENUE DEBIT

## 2017-11-15 ASSESSMENT — ENCOUNTER SYMPTOMS
DIFFICULTY THINKING: 1
POOR JUDGMENT: 1
DEPRESSED MOOD: 1
DEBILITATING PAIN: 1
POOR JUDGMENT: 1
DIFFICULTY THINKING: 1

## 2017-11-16 ENCOUNTER — HOME CARE VISIT (OUTPATIENT)
Dept: HOME HEALTH SERVICES | Facility: HOME HEALTHCARE | Age: 72
End: 2017-11-16
Payer: MEDICARE

## 2017-11-16 VITALS
SYSTOLIC BLOOD PRESSURE: 123 MMHG | OXYGEN SATURATION: 99 % | RESPIRATION RATE: 18 BRPM | TEMPERATURE: 98.5 F | DIASTOLIC BLOOD PRESSURE: 78 MMHG | HEART RATE: 63 BPM

## 2017-11-16 PROCEDURE — 665998 HH PPS REVENUE CREDIT

## 2017-11-16 PROCEDURE — 665999 HH PPS REVENUE DEBIT

## 2017-11-16 PROCEDURE — G0160 HHC OCCUP THERAPY EA 15: HCPCS

## 2017-11-16 PROCEDURE — 665997 HH PPS REVENUE ADJ

## 2017-11-16 SDOH — ECONOMIC STABILITY: HOUSING INSECURITY: UNSAFE APPLIANCES: 0

## 2017-11-16 SDOH — ECONOMIC STABILITY: HOUSING INSECURITY: UNSAFE COOKING RANGE AREA: 0

## 2017-11-16 ASSESSMENT — ENCOUNTER SYMPTOMS
DEPRESSED MOOD: 1
DIFFICULTY THINKING: 1

## 2017-11-16 ASSESSMENT — ACTIVITIES OF DAILY LIVING (ADL)
HOME_HEALTH_OASIS: 01
OASIS_M1830: 03

## 2017-11-17 PROCEDURE — 665999 HH PPS REVENUE DEBIT

## 2017-11-17 PROCEDURE — 665998 HH PPS REVENUE CREDIT

## 2017-11-18 PROCEDURE — 665998 HH PPS REVENUE CREDIT

## 2017-11-18 PROCEDURE — 665999 HH PPS REVENUE DEBIT

## 2017-11-19 PROCEDURE — 665999 HH PPS REVENUE DEBIT

## 2017-11-19 PROCEDURE — 665998 HH PPS REVENUE CREDIT

## 2017-11-20 ENCOUNTER — OFFICE VISIT (OUTPATIENT)
Dept: CARDIOLOGY | Facility: MEDICAL CENTER | Age: 72
End: 2017-11-20
Payer: MEDICARE

## 2017-11-20 ENCOUNTER — PHYSICAL THERAPY (OUTPATIENT)
Dept: PHYSICAL THERAPY | Facility: REHABILITATION | Age: 72
End: 2017-11-20
Attending: FAMILY MEDICINE
Payer: MEDICARE

## 2017-11-20 VITALS
DIASTOLIC BLOOD PRESSURE: 80 MMHG | HEART RATE: 78 BPM | OXYGEN SATURATION: 96 % | BODY MASS INDEX: 25.87 KG/M2 | WEIGHT: 191 LBS | HEIGHT: 72 IN | SYSTOLIC BLOOD PRESSURE: 110 MMHG

## 2017-11-20 DIAGNOSIS — Z99.3 WHEELCHAIR DEPENDENT: ICD-10-CM

## 2017-11-20 DIAGNOSIS — E83.42 HYPOMAGNESEMIA: ICD-10-CM

## 2017-11-20 DIAGNOSIS — Z86.73 HISTORY OF ARTERIAL ISCHEMIC STROKE: ICD-10-CM

## 2017-11-20 DIAGNOSIS — I25.10 ATHEROSCLEROSIS OF NATIVE CORONARY ARTERY OF NATIVE HEART WITHOUT ANGINA PECTORIS: ICD-10-CM

## 2017-11-20 DIAGNOSIS — I21.4 NSTEMI (NON-ST ELEVATED MYOCARDIAL INFARCTION) (HCC): ICD-10-CM

## 2017-11-20 PROCEDURE — G8979 MOBILITY GOAL STATUS: HCPCS | Mod: CJ

## 2017-11-20 PROCEDURE — 665999 HH PPS REVENUE DEBIT

## 2017-11-20 PROCEDURE — 97163 PT EVAL HIGH COMPLEX 45 MIN: CPT

## 2017-11-20 PROCEDURE — 99213 OFFICE O/P EST LOW 20 MIN: CPT | Performed by: INTERNAL MEDICINE

## 2017-11-20 PROCEDURE — G8978 MOBILITY CURRENT STATUS: HCPCS | Mod: CL

## 2017-11-20 PROCEDURE — 97110 THERAPEUTIC EXERCISES: CPT

## 2017-11-20 PROCEDURE — 665998 HH PPS REVENUE CREDIT

## 2017-11-20 SDOH — ECONOMIC STABILITY: GENERAL: QUALITY OF LIFE: FAIR

## 2017-11-20 ASSESSMENT — ENCOUNTER SYMPTOMS
EXACERBATED BY: SLEEPING
PAIN SCALE AT LOWEST: 0
FALLS: 0
QUALITY: ACHING
QUALITY: CRAMPING
WHEEZING: 0
DIARRHEA: 1
PND: 0
PAIN SCALE AT HIGHEST: 8
MYALGIAS: 0
ORTHOPNEA: 0
PAIN SCALE: 0
PAIN TIMING: DURING SLEEP
BRUISES/BLEEDS EASILY: 0
COUGH: 0
PAIN LOCATION: RIGHT GROIN/THIGH
PAIN TIMING: AT BEDTIME

## 2017-11-20 ASSESSMENT — ACTIVITIES OF DAILY LIVING (ADL)
AMBULATION_WITH_ASSISTIVE_DEVICE: CONTACT GUARD ASSIST
POOR_BALANCE: 1
AMBULATION_WITHOUT_ASSISTIVE_DEVICE: UNABLE

## 2017-11-20 ASSESSMENT — BALANCE ASSESSMENTS
BALANCE - STANDING DYNAMIC: TRACE +
BALANCE - STANDING STATIC: POOR
WEIGHT SHIFT STANDING: POOR
WEIGHT SHIFT SITTING: GOOD
BALANCE - SITTING STATIC: GOOD
BALANCE - SITTING DYNAMIC: FAIR +

## 2017-11-20 NOTE — OP THERAPY EVALUATION
Outpatient Physical Therapy  INITIAL NEUROLOGICAL EVALUATION    [facility] [address]  [address]  [place] [address]  Phone:  [phone]  Fax:  [phone]    Date of Evaluation: 2017    Patient: Av Bob  YOB: 1945  MRN: [id number]     Referring Provider: Mitchell Pantoja M.D.  [address]  [address]  [place] [address]   Referring Diagnosis History of arterial ischemic stroke [Z86.73];Wheelchair dependent [Z99.3]     Time Calculation  Start time: 1000  Stop time: 1100 Time Calculation (min): 60 minutes     Physical Therapy Occurrence Codes    Date physical therapy care plan established or reviewed:  17   Date physical therapy treatment started:  17          Chief Complaint: No chief complaint on file.    Visit Diagnoses     ICD-10-CM   1. History of arterial ischemic stroke Z86.73   2. Wheelchair dependent Z99.3       Subjective:   History of Present Illness:     Date of onset:  2016    Mechanism of injury:  S/P CVA 12/31/15, DX with non hodgkins lymphoma with subsequent chemo. Prolonged immobilization in bed 2-3 months after norovirus/ICU delerium.  Went to skilled nursing 2017 and acute rehab worked on functional mobility. Multiple hospitalizations for kidney infection and kidney stones with surgery.   Skilled Nursing up until 2 months ago.  Home health up until last week.  Lack of initiation and [place] with functional mobility at home per patients wife.  Patient is currently in bed 75% of day sleeping.  Hx positive for depression.  Quality of life:  Fair  Prior level of function:  Walking without device or SPC   Sleep disturbance:  Non-restful sleep (does not sleep at night, sleeps during the day)  Pain:     Current pain ratin    At best pain ratin    At worst pain ratin    Location:  Right groin/thigh    Quality:  Cramping and aching    Pain timing:  At bedtime and during sleep     Aggravating factors:  Sleeping    Progression:  Stable  Social Support:     Lives with:  Spouse  Treatments:     Previous treatment:  Home therapy    Discharged from (in last 30 days): home health care    Activities of Daily Living:     Patient reported ADL status: Walking 50 feet with forearm trough walker. In bed 75% of the day.  Requires assist for all ADLS from spouse      Past Medical History:   Diagnosis Date   • Acute respiratory failure with hypoxia (CMS-HCC) 5/20/2017   • Cerebrovascular accident (CVA) (CMS-HCC) 12/30/2016    Left hemiparesis, etiology of stroke not established, lymphoma discovered on MRI evaluation of stroke   • CKD (chronic kidney disease) stage 3, GFR 30-59 ml/min    • Controlled gout 2014   • Coronary atherosclerosis of native coronary artery    • Enterococcal septicemia (CMS-HCC) 8/12/2017   • Hypertension    • Hypokalemia 2012    controlled with combination of ACE inhibitor or ARB plus spironolactone   • Leg wound, right 9/2016    Followed in wound care   • 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 Barry   • NSTEMI (non-ST elevated myocardial infarction) (CMS-HCC) 07/18/2017    complicating UTI with sepsis   • Polyneuropathy in diabetes(357.2) 9/11/2013   • Presence of drug coated stent in LAD coronary artery     Overlapping [place] 3.0 stents, 7/10/2009    • Presence of drug coated stent in left circumflex coronary artery     Overlapping 2.5mm Taxus and [place] stents in OM, 7/10/2009    • S/P PTCA (percutaneous transluminal coronary angioplasty), RCA, 5/1997, patent 7/10/2009    • Septic shock (CMS-HCC) 5/20/2017   • Skin ulcer of calf (CMS-HCC) 2015    Dr. Terry and wound care   • Type II or unspecified type diabetes mellitus with neurological manifestations, uncontrolled(250.62) 9/11/2013   • Wound of left leg 2012    Requiring surgery and debridment, Dr. Moore     Past Surgical  History:   Procedure Laterality Date   • WOUND CLOSURE GENERAL  4/3/2012    Performed by GERHARD GILBERT at SURGERY SAME DAY [facility]   • ANGIOPLASTY  1997    RCA followed by other stents as noted above.    • CATARACT EXTRACTION WITH IOL      bilateral   • LITHOTRIPSY     • TONSILLECTOMY AND ADENOIDECTOMY     • TONSILLECTOMY AND ADENOIDECTOMY       Social History   Substance Use Topics   • Smoking status: Never Smoker   • Smokeless tobacco: Never Used   • Alcohol use No     Family and Occupational History     Social History   • Marital status:      Spouse name: N/A   • Number of children: N/A   • Years of education: N/A       Objective:     Active Range of Motion:   Upper extremity (left):     Shoulder flexion: Below functional limits    Shoulder extension: Below functional limits    Shoulder abduction: Below functional limits  Lower extremity (left):     Hip flexion: Within functional limits    Hip extension: Within functional limits    Hip abduction: Within functional limits    Hip adduction: Within functional limits    Hip external rotation: Within functional limits    HIp internal rotation: Within functional limits    Knee flexion: Within functional limits    Knee extension: Within functional limits    Ankle dorsiflexion: Within functional limits    Ankle plantar flexion: Within functional limits  Lower extremity (right):     Hip flexion: Within functional limits    Hip extension: Within functional limits    Hip abduction:Within functional limits    Hip adduction: Within functional limits    Hip external rotation: Within functional limits    Hip internal rotation: Within functional limits    Knee flexion: Within functional limits    Knee extension: Within functional limits    Ankle dorsiflexion: Within functional limits    Ankle plantar flexion: Within functional limits    Strength:     Right lower extremity strength within functional limits.  Lower extremity (left):     Hip flexion: 3-    Hip  extension: 3-    Hip adduction: 3-    Hip external rotation: 3-    Hip internal rotation: 3-    Knee flexion: 3-    Knee extension: 3-    Ankle dorsiflexion: 1    Ankle plantar flexion: 2    Tone, Sensation and Coordination:   Tone:     Left upper extremity muscle tone: Hypotonic    Right upper extremity muscle tone: Normal    Left lower extremity muscle tone: Hypotonic    Right lower extremity muscle tone: Normal    Cognition:     Orientation: normal to time    Attention span: intact    Sequencing: impaired    Organization: impaired    Problem solving: impaired    Judgement and safety awareness: impaired    Cognition Comments:  Patient demonstrating decreased initiation and problem solving during functional task/mobility .    Postural Control (Balance)     Sitting (static): Good    Sitting (dynamic): Fair +    Standing (static): Poor    Standing (dynamic): Trace +    Weight shift (sitting): Good    Weight shift (standing): Poor    Ambulation: Level Surfaces   Ambulation with assistive device: contact guard assist (forearm trough walker LUE)  Ambulation without assistive device: unable    Additional Level Surfaces Ambulation Details  Ambulates 60 feet with forearm trough walker @ cg-min assist for verbal cueing for sequencing and safety awareness.  Decreased foot clearance and weightshift left.  Excessive forward lean on walker for balance.  Patient demonstrating narrow base of support .      Quality of Movement During Gait     Knee   Knee (Left): Positive increased flexion during stance.     Ankle   Ankle (Left): Positive foot drop.     Activities of Daily Living:   Transfers/Mobility:     Bed/chair transfers: minimum assist    Wheelchair transfers: minimum assist    Sit to stand: minimum assist        Therapeutic Exercises (CPT 33444):     Total treatment time spent on Therapeutic Exercises: 15 minutes.      1. Sit to stand , 1 x 5 with fww    2. Long arc quad, 1 x 15    3. Marches, 1 x 15        Assessment,  Response and Plan:   Impairments: abnormal ADL function, abnormal coordination, abnormal gait, abnormal muscle firing, abnormal muscle tone, activity intolerance, impaired functional mobility, impaired balance, impaired physical strength, limited ADL's, limited mobility and safety issue    Assessment details:  Patient presents with impaired functional mobility, endurance  and strength secondary to prolonged period of immobility and being bedridden 3 plus months due to multiple comorbidities. In addition, patient stays in be 75% of day for reasons unknown except for the fact that he cant sleep at night.   Patient will benefit from skilled PT to meet goals stated below.  Patient may benefit from AFO to increase stability and balance with gait.  Barriers to therapy:  Cognition, non-compliant with treatment regimen, psychosocial and comorbidities  Other barriers to therapy:  Decreased motivation and initiation with home program given by home health  Prognosis: poor    Goals:   Short Term Goals:   Patient Independent with all transfers in home.  Patient out of bed > 50% daytime hours  Ambulation 150 with A.D Independent-Supervised  Short term goal time span:  2-4 weeks      Long Term Goals:    Independent with all ADLS  Ambulation 300 plus feet with A.D Independent    Plan:   Therapy options:  Physical therapy treatment to continue  Planned therapy interventions:  Coordination, gait training, ADL self/home management, home exercise program, modalities, safety awareness, patient education, neuromuscular re-education, manual therapy, therapeutic activities and therapeutic exercise  Frequency:  2x week  Duration in visits:  10  Duration in weeks:  6  Discussed with:  Patient and caregiver      Functional Limitation G-Codes and Severity Modifiers  Current: Mobility: Current (): CL   Goal: Mobility: Goal (): CJ       Referring provider co-signature:  I have reviewed this plan of care and my co-signature certifies  the need for services.  Certification Dates:   From 11/20/2017     To 2/20/2018    Physician Signature: ________________________________ Date: ______________

## 2017-11-21 ENCOUNTER — OFFICE VISIT (OUTPATIENT)
Dept: MEDICAL GROUP | Facility: MEDICAL CENTER | Age: 72
End: 2017-11-21
Payer: MEDICARE

## 2017-11-21 VITALS
DIASTOLIC BLOOD PRESSURE: 88 MMHG | HEIGHT: 72 IN | TEMPERATURE: 98.2 F | BODY MASS INDEX: 25.87 KG/M2 | SYSTOLIC BLOOD PRESSURE: 130 MMHG | OXYGEN SATURATION: 98 % | HEART RATE: 65 BPM | RESPIRATION RATE: 14 BRPM | WEIGHT: 191 LBS

## 2017-11-21 DIAGNOSIS — F33.2 SEVERE EPISODE OF RECURRENT MAJOR DEPRESSIVE DISORDER, WITHOUT PSYCHOTIC FEATURES (HCC): ICD-10-CM

## 2017-11-21 DIAGNOSIS — F51.04 PSYCHOPHYSIOLOGICAL INSOMNIA: ICD-10-CM

## 2017-11-21 DIAGNOSIS — Z99.3 WHEELCHAIR DEPENDENT: ICD-10-CM

## 2017-11-21 DIAGNOSIS — R19.5 LOOSE STOOLS: ICD-10-CM

## 2017-11-21 DIAGNOSIS — R79.9 ELEVATED BUN: ICD-10-CM

## 2017-11-21 DIAGNOSIS — E83.42 HYPOMAGNESEMIA: ICD-10-CM

## 2017-11-21 PROCEDURE — 665999 HH PPS REVENUE DEBIT

## 2017-11-21 PROCEDURE — 99215 OFFICE O/P EST HI 40 MIN: CPT | Performed by: FAMILY MEDICINE

## 2017-11-21 PROCEDURE — 665998 HH PPS REVENUE CREDIT

## 2017-11-21 RX ORDER — CHOLECALCIFEROL (VITAMIN D3) 125 MCG
5-10 CAPSULE ORAL NIGHTLY PRN
COMMUNITY
Start: 2017-11-21 | End: 2018-02-01

## 2017-11-21 NOTE — PROGRESS NOTES
Subjective:   Av Bob is a 72 y.o. male who presents today for follow-up of coronary disease. I have still not been able to get the myocardial perfusion scan from [facility]. He was initially told that he should have a follow-up angiogram but then it was deferred until he could complete rehabilitation and then he was discharged to be followed back here.  No angina, palpitation or dyspnea.  He is slowly progressing in rehabilitation.    Past Medical History:   Diagnosis Date   • Acute respiratory failure with hypoxia (CMS-HCC) 5/20/2017   • Cerebrovascular accident (CVA) (CMS-HCC) 12/30/2016    Left hemiparesis, etiology of stroke not established, lymphoma discovered on MRI evaluation of stroke   • CKD (chronic kidney disease) stage 3, GFR 30-59 ml/min    • Controlled gout 2014   • Coronary atherosclerosis of native coronary artery    • Enterococcal septicemia (CMS-HCC) 8/12/2017   • Hypertension    • Hypokalemia 2012    controlled with combination of ACE inhibitor or ARB plus spironolactone   • Hypomagnesemia 08/12/2017    etiology uncertain   • Leg wound, right 9/2016    Followed in wound care   • 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 Barry   • NSTEMI (non-ST elevated myocardial infarction) (CMS-HCC) 07/18/2017    complicating UTI with sepsis   • Polyneuropathy in diabetes(357.2) 9/11/2013   • Presence of drug coated stent in LAD coronary artery     Overlapping [place] 3.0 stents, 7/10/2009    • Presence of drug coated stent in left circumflex coronary artery     Overlapping 2.5mm Taxus and [place] stents in OM, 7/10/2009    • S/P PTCA (percutaneous transluminal coronary angioplasty), RCA, 5/1997, patent 7/10/2009    • Septic shock (CMS-HCC) 5/20/2017   • Skin ulcer of calf (CMS-HCC) 2015    Dr. Terry and wound care   • Type II or unspecified type diabetes mellitus with neurological  manifestations, uncontrolled(250.62) 9/11/2013   • Wound of left leg 2012    Requiring surgery and debridment, Dr. Moore     Past Surgical History:   Procedure Laterality Date   • WOUND CLOSURE GENERAL  4/3/2012    Performed by GERHARD MOORE at SURGERY SAME DAY [facility]   • ANGIOPLASTY  1997    RCA followed by other stents as noted above.    • CATARACT EXTRACTION WITH IOL      bilateral   • LITHOTRIPSY     • TONSILLECTOMY AND ADENOIDECTOMY     • TONSILLECTOMY AND ADENOIDECTOMY       Family History   Problem Relation Age of Onset   • Heart Disease Father      CAD   • Diabetes Father    • Cancer Mother    • Kidney stones Brother    • Heart Disease Brother      History   Smoking Status   • Never Smoker   Smokeless Tobacco   • Never Used     Allergies   Allergen Reactions   • Diphenhydramine Hcl Anxiety     Pt is able to tolerate  Mg benadryl with less anxiety   • Lorazepam Unspecified     Disorientation   • Ciprofloxacin      Rash,stomach ache   • Nkda [No Known Drug Allergy]      Outpatient Encounter Prescriptions as of 11/20/2017   Medication Sig Dispense Refill   • fenofibrate (TRICOR) 145 MG Tab Take 1 Tab by mouth every day. 90 Tab 3   • tramadol (ULTRAM) 50 MG Tab Take 50 mg by mouth every four hours as needed.     • losartan (COZAAR) 50 MG Tab Take 1 Tab by mouth every day. 30 Tab 6   • Magnesium 400 MG Tab Take 2 Tabs by mouth 3 times a day. 180 Tab 6   • metformin ER (GLUCOPHAGE XR) 500 MG TABLET SR 24 HR Take 1 Tab by mouth 2 times a day. 60 Tab 0   • spironolactone (ALDACTONE) 50 MG Tab Take 1 Tab by mouth every day. 90 Tab 3   • Insulin Glargine (TOUJEO SOLOSTAR SC) Inject 15 Units as instructed every evening. use 15 units nightly according to endocrenologist instruction on 3/23/17     • loperamide (IMODIUM A-D) 2 MG Cap Take 2 mg by mouth 4 times a day as needed for Diarrhea.     • aspirin 81 MG tablet Take 81 mg by mouth every day.     • Acetaminophen 325 MG Cap Take 650 mg by mouth 4 times a day  "as needed (Pain).     • fluoxetine (PROZAC) 20 MG Cap Take 20 mg by mouth every day.     • metoprolol (TOPROL-XL) 200 MG XL tablet Take 1 Tab by mouth every day. 30 Tab 5   • insulin lispro, Human, (HUMALOG) 100 UNIT/ML Solution Pen-injector injection Using up to 50 units per day as directed. 45 mL 2   • nystatin (MYCOSTATIN) Powder Apply 1 Dose to affected area(s) 3 times a day as needed. groin rash     • ascorbic acid (VITAMIN C) 500 MG/5ML syrup Take 500 mg by mouth every day.     • atorvastatin (LIPITOR) 40 MG Tab Take 0.5 Tabs by mouth every day. 90 Tab 3   • clopidogrel (PLAVIX) 75 MG Tab Take 1 Tab by mouth every day. 90 Tab 3   • allopurinol (ZYLOPRIM) 300 MG Tab Take 1 Tab by mouth every day. 90 Tab 3   • nitroglycerin (NITROSTAT) 0.4 MG SL Tab Place 1 Tab under tongue as needed for Chest Pain. 25 Tab 6   • B-D UF III MINI PEN NEEDLES 31G X 5 MM Misc USE AS DIRECTED WITH INSULIN INJECTIONS 450 Each 2   • CENTRUM SILVER PO Take 1 Tab by mouth every day.     • [DISCONTINUED] Omega-3 Fatty Acids (OMEGA-3 FISH OIL PO) Take 2 Capsule by mouth 3 times a day. Supposed to be for two weeks and then will adjust as needed.       No facility-administered encounter medications on file as of 11/20/2017.      Review of Systems   HENT: Negative for nosebleeds.    Respiratory: Negative for cough and wheezing.    Cardiovascular: Negative for orthopnea and PND.   Gastrointestinal: Positive for diarrhea (this is his most aggravating symptom).   Musculoskeletal: Negative for falls and myalgias.   Endo/Heme/Allergies: Does not bruise/bleed easily.        Oncology follow-up with Dr. Agrawal has been very satisfactory and apparently he is in remission and is going to establish follow-up with Dr. Noel here.        Objective:   /80   Pulse 78   Ht 1.829 m (6' 0.01\")   Wt 86.6 kg (191 lb)   SpO2 96%   BMI 25.90 kg/m²     Physical Exam   Constitutional: He is oriented to person, place, and time. He appears well-developed " and well-nourished.   He looks a lot better but he still uses a transport chair for any distance.   Eyes: Conjunctivae are normal. No scleral icterus.   Neck: No JVD present.   Cardiovascular: Normal rate, regular rhythm, normal heart sounds and intact distal pulses.  Exam reveals no gallop.    No murmur heard.  Pulmonary/Chest: Effort normal and breath sounds normal.   Musculoskeletal: He exhibits no edema.   Neurological: He is alert and oriented to person, place, and time.   Skin: Skin is warm and dry.   Psychiatric: He has a normal mood and affect. Thought content normal.       Assessment:     1. Atherosclerosis of native coronary artery of native heart without angina pectoris     2. NSTEMI (non-ST elevated myocardial infarction) (CMS-[facility])     3. 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 I made no changes in his regimen except I stopped the fish oil and in hopes of improving his diarrhea. The next thing we'll have to stop is the magnesium which will be a problem because he does get hypomagnesemic etiology of which is also not been demonstrated. Urgent evaluation for any cardiovascular symptoms and otherwise follow-up in a month but sooner if needed and I will call him when I get the records reviewed to plan further strategy.

## 2017-11-21 NOTE — ASSESSMENT & PLAN NOTE
"Patient continues to be partially wheelchair dependent, as he has difficulty ambulating without assistance. However, patient states that he can use his walker for ambulation purposes. Patient strongly encouraged to continue with physical therapy, occupational therapy, and speech therapy in the outpatient setting, as he has \"graduated\" from the home therapies.  "

## 2017-11-21 NOTE — ASSESSMENT & PLAN NOTE
"Patient still with insomnia as discussed in last visit, we talked this time about sleep hygiene in further detail, to reiterate that he should not watch TV for the last several before bedtime, can use over-the-counter sleep aids such as melatonin. Patient and his wife describe having an abnormal side effect to diphenhydramine that he was given in the hospital, got \"the reverse reaction everyone else gets. \"    Of note, this insomnia has been present as long as he can remember associated with his depression. Please see notes from same date of service 11/21/2017 regarding severe depression.  "

## 2017-11-21 NOTE — ASSESSMENT & PLAN NOTE
"Patient has trialed a dose of vitamin D3 6000 IUs by mouth daily for last 2 weeks. He has not noticed an interval improvement. In fact, he and his wife. In noticing that he has a continuation as well as probable mild to moderate progression/worsening of his depression. Patient states that he can cry for many hours at a time at night, particularly when he is alone. Patient's wife report that he has decreased energy and concentration as well as decreased interest in things that he would normally be interested in doing, particularly also participating with his \"homework\" for physical therapy and occupational therapy.    Patient, his wife, and I discussed that given that he has had multiple medical conditions that have cropped up within the last year, this can also ball together to cause and thereafter exacerbated his depression. Therefore, may take time to see improvement through a combination of medication, referrals (patient and wife are open to behavioral health referral), as well as working on things at home either individually or collectively.    SIG E CAPS:      Positives: Sleep insomnia, Interest decreased, Energy decreased, Concentration decreased, Psychomotor Activity Change lowered and Depressed Mood present     Negatives: Guilt , Appetite  and Suicidal Ideation   "

## 2017-11-21 NOTE — ASSESSMENT & PLAN NOTE
Patient concerned for how long he will have to take his magnesium supplement as this is causing him to have multiple loose stools per day.  These are not watery, no other s/sx of infection, and are most likely 2/2 magnesium supplement.    Patient denies any foreign travel, ingestion of potentially unclean water, no sick contacts.    Unenhanced, review of records reveals that patient's most recent magnesium level was 1.6, which is within the range of normal. On the other hand, patient's BUNs was minimally to mildly elevated to a level of 30.    ROS is NEGATIVE for fevers, chills, abdominal pain, nausea, vomiting, hematochezia, melena.

## 2017-11-21 NOTE — PROGRESS NOTES
"Subjective:   Chief Complaint/History of Present Illness:  Av Bob is a 72 y.o. male established patient who presents today to discuss medical conditions as below:    Severe episode of recurrent major depressive disorder, without psychotic features (CMS-HCC)  Patient has trialed a dose of vitamin D3 6000 IUs by mouth daily for last 2 weeks. He has not noticed an interval improvement. In fact, he and his wife. In noticing that he has a continuation as well as probable mild to moderate progression/worsening of his depression. Patient states that he can cry for many hours at a time at night, particularly when he is alone. Patient's wife report that he has decreased energy and concentration as well as decreased interest in things that he would normally be interested in doing, particularly also participating with his \"homework\" for physical therapy and occupational therapy.    Patient, his wife, and I discussed that given that he has had multiple medical conditions that have cropped up within the last year, this can also ball together to cause and thereafter exacerbated his depression. Therefore, may take time to see improvement through a combination of medication, referrals (patient and wife are open to behavioral health referral), as well as working on things at home either individually or collectively.    SIG E CAPS:      Positives: Sleep insomnia, Interest decreased, Energy decreased, Concentration decreased, Psychomotor Activity Change lowered and Depressed Mood present     Negatives: Guilt , Appetite  and Suicidal Ideation     Hypomagnesemia  Patient concerned for how long he will have to take his magnesium supplement as this is causing him to have multiple loose stools per day.  These are not watery, no other s/sx of infection, and are most likely 2/2 magnesium supplement.    Patient denies any foreign travel, ingestion of potentially unclean water, no sick contacts.    Unenhanced, review of " "records reveals that patient's most recent magnesium level was 1.6, which is within the range of normal. On the other hand, patient's BUNs was minimally to mildly elevated to a level of 30.    ROS is NEGATIVE for fevers, chills, abdominal pain, nausea, vomiting, hematochezia, melena.    Elevated BUN  Please see notes from same date of service 11/21/2017 regarding hypomagnesemia.    Loose stools  Please see notes from same date of service 11/21/2017 regarding hypomagnesemia.    Psychophysiological insomnia  Patient still with insomnia as discussed in last visit, we talked this time about sleep hygiene in further detail, to reiterate that he should not watch TV for the last several before bedtime, can use over-the-counter sleep aids such as melatonin. Patient and his wife describe having an abnormal side effect to diphenhydramine that he was given in the hospital, got \"the reverse reaction everyone else gets. \"    Of note, this insomnia has been present as long as he can remember associated with his depression. Please see notes from same date of service 11/21/2017 regarding severe depression.    Wheelchair dependent  Patient continues to be partially wheelchair dependent, as he has difficulty ambulating without assistance. However, patient states that he can use his walker for ambulation purposes. Patient strongly encouraged to continue with physical therapy, occupational therapy, and speech therapy in the outpatient setting, as he has \"graduated\" from the home therapies.      Patient Active Problem List    Diagnosis Date Noted   • Elevated BUN 11/21/2017   • Loose stools 11/21/2017   • Psychophysiological insomnia 11/21/2017   • Severe episode of recurrent major depressive disorder, without psychotic features (CMS-[facility]) 11/07/2017   • Wheelchair dependent 11/07/2017   • Hypomagnesemia 08/12/2017   • Obstruction of left ureteropelvic junction due to stone 07/18/2017   • Paroxysmal atrial fibrillation (CMS-HCC) " 05/23/2017   • Normocytic hypochromic anemia 05/20/2017   • Diffuse large cell non-Hodgkin's lymphoma (CMS-HCC) 05/08/2017   • Essential hypertension, benign 03/22/2017   • History of arterial ischemic stroke 03/22/2017   • Controlled type 2 diabetes mellitus without complication, without long-term current use of insulin (CMS-HCC) 12/30/2016   • CKD (chronic kidney disease) stage 3, GFR 30-59 ml/min 08/25/2016   • History of myocardial infarction 05/07/2014   • Atherosclerosis of native coronary artery of native heart    • Idiopathic chronic gout of multiple sites without tophus 01/28/2014   • Diabetic polyneuropathy (CMS-HCC) 09/11/2013   • History of nephrolithiasis    • Presence of drug coated stent in LAD coronary artery 12/13/2011   • Presence of drug coated stent in left circumflex coronary artery 12/13/2011   • S/P PTCA (percutaneous transluminal coronary angioplasty), RCA, 5/1997, patent 7/10/2009 12/13/2011   • Mixed hyperlipidemia 12/13/2011   • Benign hypertensive heart disease without heart failure 12/13/2011       Additional History:   Allergies:    Diphenhydramine hcl; Lorazepam; Ciprofloxacin; and Nkda [no known drug allergy]     Current Medications:     Current Outpatient Prescriptions   Medication Sig Dispense Refill   • Melatonin 5 MG Tab Take 1-2 Tabs by mouth at bedtime as needed.     • fluoxetine (PROZAC) 20 MG Cap Take 40 mg by mouth every day.     • fenofibrate (TRICOR) 145 MG Tab Take 1 Tab by mouth every day. 90 Tab 3   • tramadol (ULTRAM) 50 MG Tab Take 50 mg by mouth every four hours as needed.     • losartan (COZAAR) 50 MG Tab Take 1 Tab by mouth every day. 30 Tab 6   • metformin ER (GLUCOPHAGE XR) 500 MG TABLET SR 24 HR Take 1 Tab by mouth 2 times a day. 60 Tab 0   • spironolactone (ALDACTONE) 50 MG Tab Take 1 Tab by mouth every day. 90 Tab 3   • Insulin Glargine (TOUJEO SOLOSTAR SC) Inject 15 Units as instructed every evening. use 15 units nightly according to endocrenologist  "instruction on 3/23/17     • loperamide (IMODIUM A-D) 2 MG Cap Take 2 mg by mouth 4 times a day as needed for Diarrhea.     • aspirin 81 MG tablet Take 81 mg by mouth every day.     • Acetaminophen 325 MG Cap Take 650 mg by mouth 4 times a day as needed (Pain).     • metoprolol (TOPROL-XL) 200 MG XL tablet Take 1 Tab by mouth every day. 30 Tab 5   • insulin lispro, Human, (HUMALOG) 100 UNIT/ML Solution Pen-injector injection Using up to 50 units per day as directed. 45 mL 2   • nystatin (MYCOSTATIN) Powder Apply 1 Dose to affected area(s) 3 times a day as needed. groin rash     • ascorbic acid (VITAMIN C) 500 MG/5ML syrup Take 500 mg by mouth every day.     • atorvastatin (LIPITOR) 40 MG Tab Take 0.5 Tabs by mouth every day. 90 Tab 3   • clopidogrel (PLAVIX) 75 MG Tab Take 1 Tab by mouth every day. 90 Tab 3   • allopurinol (ZYLOPRIM) 300 MG Tab Take 1 Tab by mouth every day. 90 Tab 3   • nitroglycerin (NITROSTAT) 0.4 MG SL Tab Place 1 Tab under tongue as needed for Chest Pain. 25 Tab 6   • B-D UF III MINI PEN NEEDLES 31G X 5 MM Misc USE AS DIRECTED WITH INSULIN INJECTIONS 450 Each 2   • CENTRUM SILVER PO Take 1 Tab by mouth every day.       No current facility-administered medications for this visit.         Social History:     Social History   Substance Use Topics   • Smoking status: Never Smoker   • Smokeless tobacco: Never Used   • Alcohol use No       ROS:     - NOTE: All other systems reviewed and are negative, except as in HPI.     Objective:   Physical Exam:    Vitals: Blood pressure 130/88, pulse 65, temperature 36.8 °C (98.2 °F), resp. rate 14, height 1.829 m (6' 0.01\"), weight 86.6 kg (191 lb), SpO2 98 %.   BMI: Body mass index is 25.9 kg/m².   General/Constitutional: Vitals as above, Well nourished, well developed male in no acute distress   Head/Eyes: Head is grossly normal & atraumatic, bilateral conjunctivae clear and not injected, bilateral EOMI, bilateral PERRL   ENT: Bilateral external ears grossly " "normal in appearance, Hearing grossly intact, External nares normal in appearance and without discharge/bleeding   Respiratory: No respiratory distress, bilateral lungs are clear to ausculation in all lung fields (anterior/lateral/posterior), no wheezing/rhonchi/rales   Cardiovascular: Regular rate and rhythm without murmur/gallops/rubs, distal pulses are intact and equal bilaterally (radial, posterior tibial), no bilateral lower extremity edema   MSK: Patient wheelchair dependent at present time, has abnormal gait due to left-sided hemiplegia.   Integumentary: No apparent rashes   Psych: Judgment grossly appropriate, apparent depression    Health Maintenance:     - Not addressed at this visit due to length of time spent on discussing other medical conditions.    Imaging/Labs:     -Review of records reveals the patient had hypomagnesemia as well as an elevated BUN on most recent lab work in November 6, 2017    Assessment and Plan:   1. Severe episode of recurrent major depressive disorder, without psychotic features (CMS-HCC)  Uncontrolled   A) Patient referred to behavioral health.     - REFERRAL TO BEHAVIORAL HEALTH   B) Patient and wife instructed to work on improving mental and psychiatric health via various pathologies:     --> patient can journal his thoughts    --> wife encouraged to spend time for self-care as she is primary caretaker of     --> patient has been encouraged to set goal-directed behaviors using events such as his granddaughter's dance recital or other musical or theater events in town as incentive for completing his \"homework\" for PT/OT/Speech therapy   C) Medication: Patient also advised to increase his fluoxetine to 40 minutes by mouth daily. Patient also can take melatonin to help with sleep.    - Melatonin 5 MG Tab; Take 1-2 Tabs by mouth at bedtime as needed.    2. Hypomagnesemia  Appears to be stable, well controlled. Patient to stop taking his magnesium for the next 6 days (due " to loose stools), then to recheck both his BMP as well as magnesium at that time.   - BASIC METABOLIC PANEL; Future   - MAGNESIUM; Future    3. Elevated BUN  Unknown control, will evaluate with BMP as below.   - BASIC METABOLIC PANEL; Future   - MAGNESIUM; Future    4. Loose stools  Uncontrolled, see #2 as above.   - BASIC METABOLIC PANEL; Future   - MAGNESIUM; Future    5. Psychophysiological insomnia  Uncontrolled, see #1 as above.   - Melatonin 5 MG Tab; Take 1-2 Tabs by mouth at bedtime as needed.    6. Wheelchair dependent  Uncontrolled, however improving. Patient to continue with outpatient PT/OT/speech therapy.    NOTE: A total of 40minutes was spent in direct face-to-face time with the patient, of which over 50% of the time was spent in counseling and/or coordination of care, the contents of which are described in this note.    RTC: in 1month for depression follow-up.    PLEASE NOTE: This dictation was created using voice recognition software. I have made every reasonable attempt to correct obvious errors, but I expect that there are errors of grammar and possibly content that I did not discover before finalizing the note.

## 2017-11-22 ENCOUNTER — PHYSICAL THERAPY (OUTPATIENT)
Dept: PHYSICAL THERAPY | Facility: REHABILITATION | Age: 72
End: 2017-11-22
Attending: FAMILY MEDICINE
Payer: MEDICARE

## 2017-11-22 DIAGNOSIS — Z99.3 WHEELCHAIR DEPENDENT: ICD-10-CM

## 2017-11-22 DIAGNOSIS — Z86.73 HISTORY OF ARTERIAL ISCHEMIC STROKE: ICD-10-CM

## 2017-11-22 PROCEDURE — 97112 NEUROMUSCULAR REEDUCATION: CPT

## 2017-11-22 PROCEDURE — 97116 GAIT TRAINING THERAPY: CPT

## 2017-11-22 PROCEDURE — 665998 HH PPS REVENUE CREDIT

## 2017-11-22 PROCEDURE — 665999 HH PPS REVENUE DEBIT

## 2017-11-22 NOTE — OP THERAPY DAILY TREATMENT
"  Outpatient Physical Therapy  DAILY TREATMENT     [facility] Physical [address].  [address]  [place] [address]  Phone:  [phone]  Fax:  [phone]    Date: 11/22/2017    Patient: Av Bob  YOB: 1945  MRN: [id number]     Time Calculation             Chief Complaint: Difficulty Walking    Visit #: 2    SUBJECTIVE:  States often feels \"down'    OBJECTIVE:  Current objective measures: transfer min (A)      Therapeutic Exercises (CPT 17595):     1. Supine assisted hip flex. knee abd add , x20    2. Swiss ball roll ups, 20    3. Single bridge, 10 x2    4. Tone inhibition technics (L) ue/le    5. Facilitation technics ue/le    6. Nu step 13' level 1     Practiced standing quad cane, WT. Shifting,           ASSESSMENT:   Response to treatment: Able to perform all exercise without undue fatique    PLAN/RECOMMENDATIONS:   Plan for treatment: therapy treatment to continue next visit.  Planned interventions for next visit: continue with current treatment, functional training/self care (CPT 49977), gait training (CPT 29348), neuromuscular re-education (CPT 76466), therapeutic activities (CPT 48049) and therapeutic exercise (CPT 43508).      "

## 2017-11-23 PROCEDURE — 665998 HH PPS REVENUE CREDIT

## 2017-11-23 PROCEDURE — 665997 HH PPS REVENUE ADJ

## 2017-11-23 PROCEDURE — 665999 HH PPS REVENUE DEBIT

## 2017-11-27 DIAGNOSIS — E11.65 UNCONTROLLED TYPE 2 DIABETES MELLITUS WITH CHRONIC KIDNEY DISEASE, WITHOUT LONG-TERM CURRENT USE OF INSULIN, UNSPECIFIED CKD STAGE: ICD-10-CM

## 2017-11-27 DIAGNOSIS — E11.22 UNCONTROLLED TYPE 2 DIABETES MELLITUS WITH CHRONIC KIDNEY DISEASE, WITHOUT LONG-TERM CURRENT USE OF INSULIN, UNSPECIFIED CKD STAGE: ICD-10-CM

## 2017-11-27 RX ORDER — METFORMIN HYDROCHLORIDE 500 MG/1
TABLET, EXTENDED RELEASE ORAL
Qty: 180 TAB | Refills: 0 | Status: SHIPPED | OUTPATIENT
Start: 2017-11-27 | End: 2017-11-30

## 2017-11-28 ENCOUNTER — PHYSICAL THERAPY (OUTPATIENT)
Dept: PHYSICAL THERAPY | Facility: REHABILITATION | Age: 72
End: 2017-11-28
Attending: FAMILY MEDICINE
Payer: MEDICARE

## 2017-11-28 ENCOUNTER — HOSPITAL ENCOUNTER (OUTPATIENT)
Dept: LAB | Facility: MEDICAL CENTER | Age: 72
End: 2017-11-28
Attending: FAMILY MEDICINE
Payer: MEDICARE

## 2017-11-28 DIAGNOSIS — R53.81 DEBILITY: ICD-10-CM

## 2017-11-28 DIAGNOSIS — I10 ESSENTIAL HYPERTENSION: ICD-10-CM

## 2017-11-28 DIAGNOSIS — E83.42 HYPOMAGNESEMIA: ICD-10-CM

## 2017-11-28 DIAGNOSIS — Z99.3 WHEELCHAIR DEPENDENT: ICD-10-CM

## 2017-11-28 DIAGNOSIS — R19.5 LOOSE STOOLS: ICD-10-CM

## 2017-11-28 DIAGNOSIS — R79.9 ELEVATED BUN: ICD-10-CM

## 2017-11-28 LAB
ANION GAP SERPL CALC-SCNC: 9 MMOL/L (ref 0–11.9)
BUN SERPL-MCNC: 35 MG/DL (ref 8–22)
CALCIUM SERPL-MCNC: 10 MG/DL (ref 8.5–10.5)
CHLORIDE SERPL-SCNC: 100 MMOL/L (ref 96–112)
CO2 SERPL-SCNC: 26 MMOL/L (ref 20–33)
CREAT SERPL-MCNC: 1.58 MG/DL (ref 0.5–1.4)
GFR SERPL CREATININE-BSD FRML MDRD: 43 ML/MIN/1.73 M 2
GLUCOSE SERPL-MCNC: 109 MG/DL (ref 65–99)
MAGNESIUM SERPL-MCNC: 1.3 MG/DL (ref 1.5–2.5)
POTASSIUM SERPL-SCNC: 4.6 MMOL/L (ref 3.6–5.5)
SODIUM SERPL-SCNC: 135 MMOL/L (ref 135–145)

## 2017-11-28 PROCEDURE — 80048 BASIC METABOLIC PNL TOTAL CA: CPT

## 2017-11-28 PROCEDURE — 36415 COLL VENOUS BLD VENIPUNCTURE: CPT

## 2017-11-28 PROCEDURE — 83735 ASSAY OF MAGNESIUM: CPT

## 2017-11-28 PROCEDURE — 97112 NEUROMUSCULAR REEDUCATION: CPT

## 2017-11-28 PROCEDURE — 97110 THERAPEUTIC EXERCISES: CPT

## 2017-11-28 PROCEDURE — 97116 GAIT TRAINING THERAPY: CPT

## 2017-11-28 RX ORDER — METOPROLOL SUCCINATE 200 MG/1
200 TABLET, EXTENDED RELEASE ORAL DAILY
Qty: 90 TAB | Refills: 3 | Status: SHIPPED | OUTPATIENT
Start: 2017-11-28 | End: 2018-03-27 | Stop reason: SDUPTHER

## 2017-11-28 NOTE — OP THERAPY DAILY TREATMENT
Outpatient Physical Therapy  DAILY TREATMENT     [facility] Physical [address]  [address]  [place] [address]  Phone:  [phone]  Fax:  [phone]    Date: 11/28/2017    Patient: Av Bob  YOB: 1945  MRN: [id number]     Time Calculation  Start time: 1100  Stop time: 1200 Time Calculation (min): 60 minutes     Chief Complaint: Difficulty Walking    Visit #: 3    SUBJECTIVE: Staying up longer during the day and sleeping better, intermittent right groin pain no particular activity influences it      OBJECTIVE:  Current objective measures: min assist transfer W/C to NU step, cg assist ambulation with forearm trough FWW secondary to narrow base of support, decreased foot clearance left and ataxic gait.      Therapeutic Exercises (CPT 40753):     Total treatment time spent on Therapeutic Exercises: 13 minutes.      1. Nu step L1 , 13 min    Therapeutic Treatments and Modalities:     1. Gait Training (CPT 64706), 25 minutes, Gait training 3 x 50 feet and 1 x 100 feet with forearm trough fww    2. Neuromuscular Re-education (CPT 37641), 20 minutes, standing static and dynamic balance @ cg assist 5 x 1 min static, reaching with BUE; standing at raised mat with UE AAROM on therapy ball@ cg assist      Pain rating before treatment: 0  Pain rating after treatment: 0    ASSESSMENT: Patient becomes short of breath with ambulation and requires frequent verbal cueing to maintain safe distance with fww/body.  Improved left UE AROM with functional mobility.  Response to treatment:     PLAN/RECOMMENDATIONS:   Plan for treatment: therapy treatment to continue next visit.  Planned interventions for next visit: functional training/self care (CPT 05145), gait training (CPT 23158), neuromuscular re-education (CPT 17117), therapeutic activities (CPT 24780) and therapeutic exercise (CPT 30093).

## 2017-11-29 ENCOUNTER — OCCUPATIONAL THERAPY (OUTPATIENT)
Dept: OCCUPATIONAL THERAPY | Facility: REHABILITATION | Age: 72
End: 2017-11-29
Attending: FAMILY MEDICINE
Payer: MEDICARE

## 2017-11-29 DIAGNOSIS — Z86.73 PERSONAL HISTORY OF TRANSIENT CEREBRAL ISCHEMIA: ICD-10-CM

## 2017-11-29 DIAGNOSIS — Z99.3 WHEELCHAIR DEPENDENCE: ICD-10-CM

## 2017-11-29 PROCEDURE — 97166 OT EVAL MOD COMPLEX 45 MIN: CPT

## 2017-11-29 PROCEDURE — 97110 THERAPEUTIC EXERCISES: CPT

## 2017-11-29 PROCEDURE — G8984 CARRY CURRENT STATUS: HCPCS | Mod: CK

## 2017-11-29 PROCEDURE — G8985 CARRY GOAL STATUS: HCPCS | Mod: CJ

## 2017-11-29 SDOH — ECONOMIC STABILITY: GENERAL: QUALITY OF LIFE: GOOD

## 2017-11-29 ASSESSMENT — BALANCE ASSESSMENTS
BALANCE - STANDING DYNAMIC: TRACE
BALANCE - SITTING DYNAMIC: FAIR +
BALANCE - STANDING STATIC: TRACE +
BALANCE - SITTING STATIC: FAIR +

## 2017-11-29 ASSESSMENT — ENCOUNTER SYMPTOMS
PAIN SCALE: 3
PAIN LOCATION: RIGHT UPPER LEG
QUALITY: CRAMPING
PAIN TIMING: OCCASIONAL

## 2017-11-29 ASSESSMENT — ACTIVITIES OF DAILY LIVING (ADL): POOR_BALANCE: 1

## 2017-11-29 NOTE — OP THERAPY EVALUATION
Outpatient Occupational Therapy  INITIAL NEUROLOGICAL EVALUATION    [facility] Occupational Therapy [address].  [address]  [place] [address]  Phone:  [phone]  Fax:  [phone]    Date of Evaluation: 11/29/2017    Patient: Av Bob  YOB: 1945  MRN: [id number]     Referring Provider: Mitchell Pantoja M.D.  [address]  [address]  [place] [address]   Referring Diagnosis Personal history of transient ischemic attack (TIA), and cerebral infarction without residual deficits [Z86.73];Dependence on wheelchair [Z99.3]     Time Calculation  Start time: 0830  Stop time: 0930 Time Calculation (min): 60 minutes     Occupational Therapy Occurrence Codes    Date of onset of impairment:  12/31/16   Date of occupational therapy care plan established or reviewed:  11/29/17   Date of occupational therapy treatment started:  11/29/17          Chief Complaint: Extremity Weakness and Hand Weakness    Visit Diagnoses     ICD-10-CM   1. Personal history of transient cerebral ischemia Z86.73   2. Wheelchair dependence Z99.3       Subjective:   History of Present Illness:     Date of onset:  12/31/2016    Mechanism of injury:  S/p R CVA on 12/31/16 with residual left hemipareisis  Quality of life:  Good  Prior level of function:  Independent ADLs, IADLS, mobility, driving, retired   Pain:     Current pain rating:  3    Location:  Right upper leg    Quality:  Cramping    Pain timing:  Occasional  Social Support:     Lives in:  Condominium    Lives with:  Spouse  Hand dominance:  Right  Treatments:     Previous treatment:  Occupational therapy and physical therapy    Current treatment:  Physical therapy    Discharged from (in last 30 days): home health care    Activities of Daily Living:     Patient reported ADL status: Supervised set-up ADL routine, wife performs IADLs  [employer] member  Patient Goals:     Patient goals for therapy:  Increased strength,  improved balance, increased motion and independence with ADLs/IADLs    Other patient goals:  To walk again      Past Medical History:   Diagnosis Date   • Acute respiratory failure with hypoxia (CMS-HCC) 5/20/2017   • Cerebrovascular accident (CVA) (CMS-HCC) 12/30/2016    Left hemiparesis, etiology of stroke not established, lymphoma discovered on MRI evaluation of stroke   • CKD (chronic kidney disease) stage 3, GFR 30-59 ml/min    • Controlled gout 2014   • Coronary atherosclerosis of native coronary artery    • Enterococcal septicemia (CMS-HCC) 8/12/2017   • Hypertension    • Hypokalemia 2012    controlled with combination of ACE inhibitor or ARB plus spironolactone   • Hypomagnesemia 08/12/2017    etiology uncertain   • Leg wound, right 9/2016    Followed in wound care   • 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 Barry   • NSTEMI (non-ST elevated myocardial infarction) (CMS-HCC) 07/18/2017    complicating UTI with sepsis   • Polyneuropathy in diabetes(357.2) 9/11/2013   • Presence of drug coated stent in LAD coronary artery     Overlapping [place] 3.0 stents, 7/10/2009    • Presence of drug coated stent in left circumflex coronary artery     Overlapping 2.5mm Taxus and [place] stents in OM, 7/10/2009    • S/P PTCA (percutaneous transluminal coronary angioplasty), RCA, 5/1997, patent 7/10/2009    • Septic shock (CMS-HCC) 5/20/2017   • Skin ulcer of calf (CMS-HCC) 2015    Dr. Terry and wound care   • Type II or unspecified type diabetes mellitus with neurological manifestations, uncontrolled(250.62) 9/11/2013   • Wound of left leg 2012    Requiring surgery and debridment, Dr. Moore     Past Surgical History:   Procedure Laterality Date   • WOUND CLOSURE GENERAL  4/3/2012    Performed by GERHARD MOORE at SURGERY [facility]   • ANGIOPLASTY  1997    RCA followed by other stents as noted above.    •  CATARACT EXTRACTION WITH IOL      bilateral   • LITHOTRIPSY     • TONSILLECTOMY AND ADENOIDECTOMY     • TONSILLECTOMY AND ADENOIDECTOMY       Social History   Substance Use Topics   • Smoking status: Never Smoker   • Smokeless tobacco: Never Used   • Alcohol use No     Family and Occupational History     Social History   • Marital status:      Spouse name: N/A   • Number of children: N/A   • Years of education: N/A       Objective:     Active Range of Motion:   Upper extremity (left):     Shoulder flexion: Below functional limits (0 degrees ff)    Shoulder extension: Below functional limits    Shoulder abduction: Below functional limits (0 degrees of abd)    Shoulder adduction: Below functional limits    Shoulder external rotation: Below functional limits    Shoulder internal rotation: Below functional limits    Elbow flexion: Within functional limits    Elbow extension: Within functional limits    Forearm pronation: Within functional limits    Forearm supination: Below functional limits    Wrist flexion: Within functional limits    Wrist extension: Below functional limits    Fingers flexion: Within functional limits    Fingers extension: Below functional limits (IF/MF 3/4 range extension RF/SF 1/4 range extension)    Fingers abduction: Below functional limits    Fingers adduction: Below functional limits  Upper extremity (right):     Shoulder flexion: Within functional limits    Shoulder extension: Within functional limits    Shoulder abduction: Within functional limits    Shoulder adduction: Within functional limits    Shoulder external rotation: Within functional limits    Shoulder internal rotation: Within functional limits    Elbow flexion: Within functional limits    Elbow extension: Within functional limits    Forearm pronation: Within functional limits    Forearm supination: Within functional limits    Wrist flexion: Within functional limits    Wrist extension: Within functional limits    Fingers  flexion: Within functional limits    Fingers extension: Within functional limits    Fingers abduction: Within functional limits    Fingers adduction: Within functional limits    Passive Range of Motion:   Upper extremity (left):     Shoulder flexion: Below functional limits (90 degrees, limited by pain)    Shoulder extension: Within functional limits    Shoulder abduction: Below functional limits (90 degrees limited by pain)    Shoulder adduction: Within functional limits    Shoulder external rotation: Within functional limits    Shoulder internal rotation: Within functional limits    Elbow flexion: Within functional limits    Elbow extension: Within functional limits    Forearm pronation: Within functional limits    Forearm supination: Within functional limits    Wrist flexion: Within functional limits    Wrist extension: Within functional limits (30 degrees, limited by pain)    Fingers flexion: Within functional limits    Fingers extension: Within functional limits    Fingers abduction: Within functional limits    Fingers adduction: Within functional limits    Strength:     Right upper extremity strength within functional limits.    Upper extremity strength (left):     Shoulder flexion: 1    Shoulder abduction: 1    Shoulder external rotation:  1    Elbow flexion: 3    Elbow extension: 3+    Forearm pronation: 3    Forearm supination: 1    Wrist flexion: 3+    Wrist extension: 3-    Fingers flexion: 3    Fingers extension: 2-    Fingers abduction: 2-    Fingers adduction: 2    , Prehension, Pinch:  /Prehension (left):      strength: Impaired (5 lbs)  /Prehension (right):      strength: Within functional limits (54 lbs)    Strength Comments:  Left scapular strength 2-/5    Tone, Sensation and Coordination:   Tone:     Left upper extremity muscle tone: Hypertonic    Right upper extremity muscle tone: Normal    Modified Deuce:   Upper extremity (left):     Shoulder flexors: 1+    Shoulder  internal rotators: 1+    Elbow flexors: 1+    Wrist flexors: 2    Finger flexors: 2    Tone comments:   Flexor synergy pattern    Sensation   Upper extremity (left):     Light touch: Impaired    Sharp/dull: Impaired     Proprioception: Impaired   Upper extremity (right):     Light touch: Intact    Sharp/dull: Intact     Proprioception: Intact     Sensation comments:   [place]: diminished light touch left hand    Coordination   Upper extremity (left):     Fine motor: Impaired    Gross motor: Impaired    Rapid alternating movements: Impaired    Finger to finger: Impaired (dec speed, limited by flexor tone)    Finger touch to nose: Impaired  Upper extremity (right):     Fine motor: Within functional limits    Gross motor: Within functional limits    Finger to finger: Within functional limits    Finger touch to nose: Within functional limits    Cognition:     Orientation: normal to person, normal to time, normal to place and normal to situation    Direction following: three step    Short term memory: impaired (able to recall 1/3 items after 2 minutes)    Attention span: intact    Sequencing: intact    Behavior: psychomotor retardation    Cognition Comments:  Depressed affect, impaired STM    Vision/Perception:     Visual tracking: intact    Convergence: intact    Visual attentions: intact    Visual acuity: intact    Visual scanning: intact    Spatial relations: intact    Vision assistive device(s): glasses    Vision/Perception Comments:   No apparent deficits    Postural Control (Balance)     Sitting (static): Fair +    Sitting (dynamic): Fair +    Standing (static): Trace +    Standing (dynamic): Trace    Balance/Gait Comments   Mod assist sit to stand from w/c during eval.  Pt reports min assist toilet transfer with RTS with arms via wife.    Activities of Daily Living:     ADL Comments:  Supervised sit/stand shower with grab bar, shower stall.  Supervised set-up ADLs via wife except total assist with hygiene  after bowel movement.        Therapeutic Exercises (CPT 55295):     Total treatment time spent on Therapeutic Exercises: 15 minutes.      1. LUE, passive sustained stretching throughout LUE within pain tolerance        Assessment, Response and Plan:   Impairments: abnormal ADL function, abnormal coordination, abnormal muscle firing, abnormal muscle tone, abnormal or restricted ROM, activity intolerance, fine motor function, impaired balance, impaired physical strength, lacks appropriate home exercise program and limited mobility    Assessment details:  Pt is a 72 y.o male s/p R CVA on 12/31/16 who presents to outpatient OT functioning below baseline secondary to residual left hemipareisis, flexor spasticity, decreased sensation, motor control, balance, and activity tolerance affecting his ability to be independent with ADLs and mobility. Pt's affect is depressed but he is willing to participate in a comprehensive rehab program.    Barriers to therapy:  Psychosocial  Prognosis: good    Goals:   Short Term Goals:   Pt will require min assist toileting routine with AE PRN  Pt will increase his left  strength >= 5lbs to open bottles  Pt/wife will be independent positioning of LUE to prevent contractures  Pt will increase left hand gross digit extension to WFL to release utensils with mild difficulty  Pt will require min assist sit to stand for clothing management  Pt will increase standing balance 1/2 grade for safe transfers  Short term goal timespan:  2-4 weeks    Long Term Goals:   Pt will increase his LUE AROM/strength to WFL to perform modified daily routine  Pt will require supervision only for toileting hygiene with AD PRN  Pt will be independent toilet transfers with DME  Pt will be independent HEP for stretching and strengthening LUE  Pt will score <= 20% disability on the Quick Dash  Long term goal timespan:  6-8 weeks    Plan:   Therapy options:  Occupational therapy treatment to continue  Planned therapy  interventions:  Functional Training, Self Care (CPT 84975), Manual Therapy (CPT 55731), Neuromuscular Re-education (CPT 15417), Orthotic Training (CPT 27090), Self Care ADL Training (CPT 86607), Therapeutic Activities (CPT 48609), Therapeutic Exercise (CPT 12164), TENS Applicaton (CPT 44312) and E Stim Attended (CPT 42212)  Frequency:  2x week  Duration in weeks:  10  Duration in visits:  20  Discussed with:  Patient      Functional Limitation G-Codes and Severity Modifiers  Current: Carry: Current (): CK   Goal: Carry: Goal  (): CJ       Referring provider co-signature:  I have reviewed this plan of care and my co-signature certifies the need for services.  Certification Dates:   From 11/29/17    To 1/31/18    Physician Signature: ________________________________ Date: ______________

## 2017-11-30 ENCOUNTER — HOSPITAL ENCOUNTER (OUTPATIENT)
Dept: LAB | Facility: MEDICAL CENTER | Age: 72
End: 2017-11-30
Attending: FAMILY MEDICINE
Payer: MEDICARE

## 2017-11-30 ENCOUNTER — SPEECH THERAPY (OUTPATIENT)
Dept: SPEECH THERAPY | Facility: REHABILITATION | Age: 72
End: 2017-11-30
Attending: FAMILY MEDICINE
Payer: MEDICARE

## 2017-11-30 ENCOUNTER — PATIENT MESSAGE (OUTPATIENT)
Dept: MEDICAL GROUP | Facility: MEDICAL CENTER | Age: 72
End: 2017-11-30

## 2017-11-30 ENCOUNTER — PHYSICAL THERAPY (OUTPATIENT)
Dept: PHYSICAL THERAPY | Facility: REHABILITATION | Age: 72
End: 2017-11-30
Attending: FAMILY MEDICINE
Payer: MEDICARE

## 2017-11-30 ENCOUNTER — OFFICE VISIT (OUTPATIENT)
Dept: MEDICAL GROUP | Facility: MEDICAL CENTER | Age: 72
End: 2017-11-30
Payer: MEDICARE

## 2017-11-30 VITALS
OXYGEN SATURATION: 98 % | HEART RATE: 68 BPM | HEIGHT: 72 IN | WEIGHT: 191 LBS | BODY MASS INDEX: 25.87 KG/M2 | RESPIRATION RATE: 14 BRPM | DIASTOLIC BLOOD PRESSURE: 76 MMHG | TEMPERATURE: 98.2 F | SYSTOLIC BLOOD PRESSURE: 120 MMHG

## 2017-11-30 DIAGNOSIS — E11.21 DIABETIC NEPHROPATHY ASSOCIATED WITH TYPE 2 DIABETES MELLITUS (HCC): ICD-10-CM

## 2017-11-30 DIAGNOSIS — R79.9 ELEVATED BUN: ICD-10-CM

## 2017-11-30 DIAGNOSIS — E83.42 HYPOMAGNESEMIA: ICD-10-CM

## 2017-11-30 DIAGNOSIS — E11.9 CONTROLLED TYPE 2 DIABETES MELLITUS WITHOUT COMPLICATION, WITHOUT LONG-TERM CURRENT USE OF INSULIN (HCC): ICD-10-CM

## 2017-11-30 DIAGNOSIS — R39.89 URINE DISCOLORATION: ICD-10-CM

## 2017-11-30 DIAGNOSIS — N18.30 CKD (CHRONIC KIDNEY DISEASE) STAGE 3, GFR 30-59 ML/MIN (HCC): ICD-10-CM

## 2017-11-30 DIAGNOSIS — N20.1 OBSTRUCTION OF LEFT URETEROPELVIC JUNCTION DUE TO STONE: ICD-10-CM

## 2017-11-30 DIAGNOSIS — R41.840 COGNITIVE ATTENTION DEFICIT: ICD-10-CM

## 2017-11-30 DIAGNOSIS — J00 COMMON COLD: ICD-10-CM

## 2017-11-30 DIAGNOSIS — R53.81 DEBILITY: ICD-10-CM

## 2017-11-30 DIAGNOSIS — Z99.3 WHEELCHAIR DEPENDENT: ICD-10-CM

## 2017-11-30 LAB
APPEARANCE UR: ABNORMAL
BACTERIA #/AREA URNS HPF: ABNORMAL /HPF
BILIRUB UR QL STRIP.AUTO: NEGATIVE
CHLORIDE UR-SCNC: 52 MMOL/L
COLOR UR: ABNORMAL
CREAT UR-MCNC: 104.6 MG/DL
CULTURE IF INDICATED INDCX: YES UA CULTURE
EPI CELLS #/AREA URNS HPF: ABNORMAL /HPF
GLUCOSE UR STRIP.AUTO-MCNC: NEGATIVE MG/DL
HYALINE CASTS #/AREA URNS LPF: ABNORMAL /LPF
KETONES UR STRIP.AUTO-MCNC: NEGATIVE MG/DL
LEUKOCYTE ESTERASE UR QL STRIP.AUTO: ABNORMAL
MICRO URNS: ABNORMAL
MUCOUS THREADS #/AREA URNS HPF: ABNORMAL /HPF
NITRITE UR QL STRIP.AUTO: NEGATIVE
PH UR STRIP.AUTO: 5 [PH]
POTASSIUM UR-SCNC: 37.2 MMOL/L
PROT UR QL STRIP: >=500 MG/DL
RBC # URNS HPF: ABNORMAL /HPF
RBC UR QL AUTO: ABNORMAL
RENAL EPI CELLS #/AREA URNS HPF: ABNORMAL /HPF
SODIUM UR-SCNC: 56 MMOL/L
SP GR UR STRIP.AUTO: 1.02
TRANS CELLS #/AREA URNS HPF: ABNORMAL /HPF
UROBILINOGEN UR STRIP.AUTO-MCNC: NORMAL MG/DL
WBC #/AREA URNS HPF: ABNORMAL /HPF

## 2017-11-30 PROCEDURE — 87077 CULTURE AEROBIC IDENTIFY: CPT

## 2017-11-30 PROCEDURE — 97112 NEUROMUSCULAR REEDUCATION: CPT

## 2017-11-30 PROCEDURE — 99215 OFFICE O/P EST HI 40 MIN: CPT | Performed by: FAMILY MEDICINE

## 2017-11-30 PROCEDURE — 87086 URINE CULTURE/COLONY COUNT: CPT

## 2017-11-30 PROCEDURE — 82436 ASSAY OF URINE CHLORIDE: CPT

## 2017-11-30 PROCEDURE — G9166 ATTEN GOAL STATUS: HCPCS | Mod: CJ

## 2017-11-30 PROCEDURE — 82570 ASSAY OF URINE CREATININE: CPT

## 2017-11-30 PROCEDURE — 84133 ASSAY OF URINE POTASSIUM: CPT

## 2017-11-30 PROCEDURE — 92523 SPEECH SOUND LANG COMPREHEN: CPT

## 2017-11-30 PROCEDURE — 84300 ASSAY OF URINE SODIUM: CPT

## 2017-11-30 PROCEDURE — 81001 URINALYSIS AUTO W/SCOPE: CPT

## 2017-11-30 PROCEDURE — 97116 GAIT TRAINING THERAPY: CPT

## 2017-11-30 PROCEDURE — 97110 THERAPEUTIC EXERCISES: CPT

## 2017-11-30 PROCEDURE — 87186 SC STD MICRODIL/AGAR DIL: CPT

## 2017-11-30 PROCEDURE — G9165 ATTEN CURRENT STATUS: HCPCS | Mod: CK

## 2017-11-30 NOTE — ASSESSMENT & PLAN NOTE
Relatively stable, well controlled with A1c at 7.1% in October 2017. Please see notes from same date of service 11/30/2017 regarding elevated BUN.

## 2017-11-30 NOTE — ASSESSMENT & PLAN NOTE
Patient acute illness for 2-3 days, describes having cough as well as rhinorrhea. Patient may have had subjective fevers, however is currently afebrile.    Wife was a possible sick contact.    ROS is NEGATIVE for fevers, chills, bilateral ear congestion/pain, sinus headaches, tender cervical lymph nodes, dysphagia, tachypnea, dyspnea, chest congestion, wheezing/rhonchi/rales, nausea, emesis, loose stools/diarrhea, myalgias, rash.

## 2017-11-30 NOTE — PROGRESS NOTES
"Subjective:   Chief Complaint/History of Present Illness:  Av Bob is a 72 y.o. male established patient who presents today to discuss the following medical conditions:    Common cold  Patient acute illness for 2-3 days, describes having cough as well as rhinorrhea. Patient may have had subjective fevers, however is currently afebrile.    Wife was a possible sick contact.    ROS is NEGATIVE for fevers, chills, bilateral ear congestion/pain, sinus headaches, tender cervical lymph nodes, dysphagia, tachypnea, dyspnea, chest congestion, wheezing/rhonchi/rales, nausea, emesis, loose stools/diarrhea, myalgias, rash.    Elevated BUN  Patient, his wife, and I discussed that with his recent labs, he at least has acute on chronic kidney disease, as well as low magnesium levels. We discussed differential diagnosis for this condition, including obstructive uropathy (secondary to nephrolithiasis) sees or urinary tract infection. We will test for these today.    Additionally, patient is concerned that his fatigue is continuing, and this could be related to his renal problems.    We then discussed how metformin could be a possible inciting factor to his renal issues, and that we will manage his diabetes as follows: Discontinue metformin, increasing his Lantus or to USP to 15 units subcutaneous per night eventually, can increase it by 2-3 units every 3 nights. Patient can then evaluate his blood sugars and symptoms for possible hypoglycemic episodes.    ROS is POSITIVE for hematuria, pyuria, \"orange-colored urine,\" and is NEGATIVE for fevers, chills, rigors, flank pain, dysuria, polyuria, increased frequency of urination, diarrhea, constipation.    ROS is NEGATIVE for blurred vision, polydipsia, polyuria, diaphoresis, palpitations, fatigue, irritability, flank pain, BLE paresthesias.    Urine discoloration  Please see notes from same date of service 11/30/2017 regarding elevated BUN.    Obstruction of left " ureteropelvic junction due to stone  Please see notes from same date of service 11/30/2017 regarding elevated BUN.    CKD (chronic kidney disease) stage 3, GFR 30-59 ml/min  Please see notes from same date of service 11/30/2017 regarding elevated BUN.    Hypomagnesemia  Patient has stopped taking his magnesium, and has had resumption of normal stool caliber and quality as well as frequency. However, in light of his hypomagnesemia, I am recommending that he restart his magnesium to 200 mg by mouth daily.    Please see notes from same date of service 11/30/2017 regarding elevated BUN.    Diabetic nephropathy associated with type 2 diabetes mellitus (CMS-HCC)  Review of microalbumin to creatinine ratio reveals that this is elevated 148. Therefore, he needs to have nephrology evaluation.    Controlled type 2 diabetes mellitus without complication, without long-term current use of insulin (CMS-HCC)  Relatively stable, well controlled with A1c at 7.1% in October 2017. Please see notes from same date of service 11/30/2017 regarding elevated BUN.      Patient Active Problem List    Diagnosis Date Noted   • Urine discoloration 11/30/2017   • Common cold 11/30/2017   • Diabetic nephropathy associated with type 2 diabetes mellitus (CMS-[facility]) 11/30/2017   • Elevated BUN 11/21/2017   • Loose stools 11/21/2017   • Psychophysiological insomnia 11/21/2017   • Severe episode of recurrent major depressive disorder, without psychotic features (CMS-HCC) 11/07/2017   • Wheelchair dependent 11/07/2017   • Hypomagnesemia 08/12/2017   • Obstruction of left ureteropelvic junction due to stone 07/18/2017   • Paroxysmal atrial fibrillation (CMS-HCC) 05/23/2017   • Normocytic hypochromic anemia 05/20/2017   • Diffuse large cell non-Hodgkin's lymphoma (CMS-HCC) 05/08/2017   • Essential hypertension, benign 03/22/2017   • History of arterial ischemic stroke 03/22/2017   • Controlled type 2 diabetes mellitus without complication, without long-term  current use of insulin (CMS-HCC) 12/30/2016   • CKD (chronic kidney disease) stage 3, GFR 30-59 ml/min 08/25/2016   • History of myocardial infarction 05/07/2014   • Atherosclerosis of native coronary artery of native heart    • Idiopathic chronic gout of multiple sites without tophus 01/28/2014   • Diabetic polyneuropathy (CMS-HCC) 09/11/2013   • History of nephrolithiasis    • Presence of drug coated stent in LAD coronary artery 12/13/2011   • Presence of drug coated stent in left circumflex coronary artery 12/13/2011   • S/P PTCA (percutaneous transluminal coronary angioplasty), RCA, 5/1997, patent 7/10/2009 12/13/2011   • Mixed hyperlipidemia 12/13/2011   • Benign hypertensive heart disease without heart failure 12/13/2011       Additional History:   Allergies:    Diphenhydramine hcl; Lorazepam; Ciprofloxacin; and Nkda [no known drug allergy]     Current Medications:     Current Outpatient Prescriptions   Medication Sig Dispense Refill   • Insulin Glargine (TOUJEO SOLOSTAR SC) Inject 15 Units as instructed every evening. use 15 units nightly according to endocrenologist instruction on 3/23/17     • insulin lispro, Human, (HUMALOG) 100 UNIT/ML Solution Pen-injector injection Using up to 50 units per day as directed. 45 mL 2   • metoprolol (TOPROL-XL) 200 MG XL tablet Take 1 Tab by mouth every day. 90 Tab 3   • Melatonin 5 MG Tab Take 1-2 Tabs by mouth at bedtime as needed.     • fenofibrate (TRICOR) 145 MG Tab Take 1 Tab by mouth every day. 90 Tab 3   • tramadol (ULTRAM) 50 MG Tab Take 50 mg by mouth every four hours as needed.     • losartan (COZAAR) 50 MG Tab Take 1 Tab by mouth every day. 30 Tab 6   • spironolactone (ALDACTONE) 50 MG Tab Take 1 Tab by mouth every day. 90 Tab 3   • loperamide (IMODIUM A-D) 2 MG Cap Take 2 mg by mouth 4 times a day as needed for Diarrhea.     • aspirin 81 MG tablet Take 81 mg by mouth every day.     • Acetaminophen 325 MG Cap Take 650 mg by mouth 4 times a day as needed (Pain).   "   • fluoxetine (PROZAC) 20 MG Cap Take 40 mg by mouth every day.     • nystatin (MYCOSTATIN) Powder Apply 1 Dose to affected area(s) 3 times a day as needed. groin rash     • ascorbic acid (VITAMIN C) 500 MG/5ML syrup Take 500 mg by mouth every day.     • atorvastatin (LIPITOR) 40 MG Tab Take 0.5 Tabs by mouth every day. 90 Tab 3   • clopidogrel (PLAVIX) 75 MG Tab Take 1 Tab by mouth every day. 90 Tab 3   • allopurinol (ZYLOPRIM) 300 MG Tab Take 1 Tab by mouth every day. 90 Tab 3   • nitroglycerin (NITROSTAT) 0.4 MG SL Tab Place 1 Tab under tongue as needed for Chest Pain. 25 Tab 6   • B-D UF III MINI PEN NEEDLES 31G X 5 MM Misc USE AS DIRECTED WITH INSULIN INJECTIONS 450 Each 2   • CENTRUM SILVER PO Take 1 Tab by mouth every day.       No current facility-administered medications for this visit.         Social History:     Social History   Substance Use Topics   • Smoking status: Never Smoker   • Smokeless tobacco: Never Used   • Alcohol use No       ROS:     - NOTE: All other systems reviewed and are negative, except as in HPI.     Objective:   Physical Exam:    Vitals: Blood pressure 120/76, pulse 68, temperature 36.8 °C (98.2 °F), resp. rate 14, height 1.829 m (6' 0.01\"), weight 86.6 kg (191 lb), SpO2 98 %.   BMI: Body mass index is 25.9 kg/m².   General/Constitutional: Vitals as above, Well nourished, well developed male in no acute distress   Head/Eyes: Head is grossly normal & atraumatic, bilateral conjunctivae not injected, bilateral EOMI, bilateral PERRL   ENT: Bilateral external ears grossly normal in appearance, Hearing grossly intact, bilateral external canals without cerumen impaction, bilateral TMs are able to the visualized without erythema/exudates/bulging, External nares normal in appearance and without discharge/bleeding, bilateral frontal/maxillary sinuses non-tender to palpation, posterior oropharynx without erythema/exudates and without airway obstruction   Respiratory: No respiratory distress, " bilateral lungs are clear to ausculation in all lung fields (anterior/lateral/posterior), no wheezing/rhonchi/rales   Cardiovascular: Regular rate and rhythm without murmur/gallops/rubs, distal pulses are intact and equal bilaterally (radial, posterior tibial), no bilateral lower extremity edema              MSK: Patient wheelchair dependent at present time, has abnormal gait due to left-sided hemiplegia.              Integumentary: No apparent rashes              Psych: Judgment grossly appropriate, apparent depression    Health Maintenance:     - Not addressed at this visit    Imaging/Labs:     - 11/28/17 -- Elevated BUN & creatinine, low GFR, low magnesium --> Restart magnesium + Recheck in 1 week    - 11/14/17 -- urine microalbumin to creatinine ratio at 148, worsens from 58 at 2 years ago --> work on glycemic control    Assessment and Plan:   1. Common cold  Uncontrolled, however resolving. Patient given supportive care instructions    2. Elevated BUN  Uncontrolled, may be secondary to obstructive uropathy versus intrarenal versus prerenal problems.    A) We will obtain urine electrolytes and creatinine are set determine the fractional excretion of sodium to help with the differential diagnosis. We're also testing his urine for possible UTI.    - ELECTROLYTES RANDOM URINE; Future    - URINE CREATININE RANDOM; Future    - URINALYSIS,CULTURE IF INDICATED; Future   B) Patient to increase fluid hydration, and we will recheck BMP as well as magnesium and approximate 2 weeks.    - BASIC METABOLIC PANEL; Future   C) Patient is also being referred to nephrology, and being discontinued off his metformin with an increase of his insulin in order to help maintain glycemic control.    - REFERRAL TO NEPHROLOGY    3. Urine discoloration  Uncontrolled, see #2 as above.   - URINALYSIS,CULTURE IF INDICATED; Future    4. Obstruction of left ureteropelvic junction due to stone  Uncontrolled, see #2 as above.   - URINALYSIS,CULTURE  IF INDICATED; Future    5. CKD (chronic kidney disease) stage 3, GFR 30-59 ml/min  Uncontrolled, see #2 as above.   - BASIC METABOLIC PANEL; Future   - IONIZED CALCIUM; Future   - MAGNESIUM; Future   - REFERRAL TO NEPHROLOGY    6. Hypomagnesemia  Uncontrolled, patient to resume taking his magnesium, and I will recheck the magnesium level as well as calcium levels and BMP in approximately 2 weeks   - BASIC METABOLIC PANEL; Future   - IONIZED CALCIUM; Future   - MAGNESIUM; Future    7. Diabetic nephropathy associated with type 2 diabetes mellitus (CMS-[facility])  Uncontrolled, referral to Nephrology.   - REFERRAL TO NEPHROLOGY    8. Controlled type 2 diabetes mellitus without complication, without long-term current use of insulin (CMS-[facility])  Nearly controlled, we are discontinuing metformin, and increasing Lantus. Patient to follow-up in 2 weeks and we will discuss his blood sugars at that time.    NOTE: A total of 40minutes was spent in direct face-to-face time with the patient, of which over 50% of the time was spent in counseling and/or coordination of care, the contents of which are described in this note.      RTC: In approximately 2 weeks for follow-up on renal issues as well as diabetes management.    PLEASE NOTE: This dictation was created using voice recognition software. I have made every reasonable attempt to correct obvious errors, but I expect that there are errors of grammar and possibly content that I did not discover before finalizing the note.

## 2017-11-30 NOTE — ASSESSMENT & PLAN NOTE
Patient has stopped taking his magnesium, and has had resumption of normal stool caliber and quality as well as frequency. However, in light of his hypomagnesemia, I am recommending that he restart his magnesium to 200 mg by mouth daily.    Please see notes from same date of service 11/30/2017 regarding elevated BUN.

## 2017-11-30 NOTE — ASSESSMENT & PLAN NOTE
"Patient, his wife, and I discussed that with his recent labs, he at least has acute on chronic kidney disease, as well as low magnesium levels. We discussed differential diagnosis for this condition, including obstructive uropathy (secondary to nephrolithiasis) sees or urinary tract infection. We will test for these today.    Additionally, patient is concerned that his fatigue is continuing, and this could be related to his renal problems.    We then discussed how metformin could be a possible inciting factor to his renal issues, and that we will manage his diabetes as follows: Discontinue metformin, increasing his Lantus or to [facility] to 15 units subcutaneous per night eventually, can increase it by 2-3 units every 3 nights. Patient can then evaluate his blood sugars and symptoms for possible hypoglycemic episodes.    ROS is POSITIVE for hematuria, pyuria, \"orange-colored urine,\" and is NEGATIVE for fevers, chills, rigors, flank pain, dysuria, polyuria, increased frequency of urination, diarrhea, constipation.    ROS is NEGATIVE for blurred vision, polydipsia, polyuria, diaphoresis, palpitations, fatigue, irritability, flank pain, BLE paresthesias.  "

## 2017-11-30 NOTE — ASSESSMENT & PLAN NOTE
Review of microalbumin to creatinine ratio reveals that this is elevated 148. Therefore, he needs to have nephrology evaluation.

## 2017-12-01 NOTE — OP THERAPY DAILY TREATMENT
Outpatient Physical Therapy  DAILY TREATMENT     [facility] Physical [address]  [address]  [place] [address]  Phone:  [phone]  Fax:  [phone]    Date: 11/30/2017    Patient: Av Bob  YOB: 1945  MRN: [id number]     Time Calculation  Start time: 1500  Stop time: 1600 Time Calculation (min): 60 minutes     Chief Complaint: No chief complaint on file.    Visit #: 4    SUBJECTIVE:  Patient significantly fatigued today        OBJECTIVE:  Current objective measures: Ambulated 1 x 75 feet with forearm trough walker, 1 x 25 feet with fww @ min-cg assist.  Loss of balance x 1 requiring max assist to recover.    Therapeutic Exercises (CPT 42526):     Total treatment time spent on Therapeutic Exercises: 15 minutes.      1. Standing marches, 2 x 10    2. Standing mini squats, 2 x 10    3. Heel raises, 2 x 10    Therapeutic Treatments and Modalities:     1. Gait Training (CPT 49955), 15 min minutes, with forearm trough walker and fww    2. Neuromuscular Re-education (CPT 86630), 15 min minutes, standing static balance with reaching BUE, with and without UE support at cg assist      Pain rating before treatment: 3  Pain rating after treatment: 0    ASSESSMENT:   Response to treatment: Fatigue limited progress with gait and balance . Decreased calf pain with exercise    PLAN/RECOMMENDATIONS:   Plan for treatment: therapy treatment to continue next visit.  Planned interventions for next visit: continue with current treatment, functional training/self care (CPT 02332), gait training (CPT 33969), manual therapy (CPT 06198), neuromuscular re-education (CPT 77613), therapeutic activities (CPT 86327) and therapeutic exercise (CPT 27773).

## 2017-12-01 NOTE — OP THERAPY EVALUATION
Outpatient Speech Therapy  INITIAL EVALUATION    [facility] Speech Therapy [place] [address]  [address]  [place] [address]  Phone:  [phone]  Fax:  [phone]    Date of Evaluation: 11/30/2017    Patient: Av Bob  YOB: 1945  MRN: [id number]     Referring Provider: Mitchell Pantoja M.D.  [address]  [address]  [place] [address]   Referring Diagnosis Personal history of transient ischemic attack (TIA), and cerebral infarction without residual deficits [Z86.73];Dependence on wheelchair [Z99.3]     Time Calculation  Start time: 1400  Stop time: 1500 Time Calculation (min): 60 minutes     Speech Therapy Occurrence Codes    Date of Onset of Impairment:  5/16/17   Date speech therapy care plan established or reviewed:  11/30/17   Date speech therapy treatment started:  11/30/17          Chief Complaint: Poor Memory    Visit Diagnoses     ICD-10-CM   1. Cognitive attention deficit R41.840     Subjective:   Reason for Therapy:     Reason For Evaluation:  CVA    Onset Description:  Pt had a brainstem CVA 12/2016 which he recuperated well from per pt/wife.  While recovering from CVA pt was diagnosed with lymphoma requiring chemo.  Tolerated well until the end of treatment when he got noro virus.  That put him in ICU.  He recovered from that then had various bouts of sepsis affecting his cognition and significant weakness.  Pt has been in acute rehab, skilled and home health therapy and is making slow but steady progress.  He is here to continue his rehab.  Depression is a barrier at this time. Prior to cva pt was very active and independent, has worked high level jobs.     Past Medical History:   Diagnosis Date   • Acute respiratory failure with hypoxia (CMS-HCC) 5/20/2017   • Cerebrovascular accident (CVA) (CMS-[facility]) 12/30/2016    Left hemiparesis, etiology of stroke not established, lymphoma discovered on MRI evaluation of stroke   • CKD (chronic kidney disease) stage 3,  GFR 30-59 ml/min    • Controlled gout 2014   • Coronary atherosclerosis of native coronary artery    • Enterococcal septicemia (CMS-HCC) 8/12/2017   • Hypertension    • Hypokalemia 2012    controlled with combination of ACE inhibitor or ARB plus spironolactone   • Hypomagnesemia 08/12/2017    etiology uncertain   • Leg wound, right 9/2016    Followed in wound care   • 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 Barry   • NSTEMI (non-ST elevated myocardial infarction) (CMS-HCC) 07/18/2017    complicating UTI with sepsis   • Polyneuropathy in diabetes(357.2) 9/11/2013   • Presence of drug coated stent in LAD coronary artery     Overlapping [place] 3.0 stents, 7/10/2009    • Presence of drug coated stent in left circumflex coronary artery     Overlapping 2.5mm [place]us and [place] stents in OM, 7/10/2009    • S/P PTCA (percutaneous transluminal coronary angioplasty), RCA, 5/1997, patent 7/10/2009    • Septic shock (CMS-HCC) 5/20/2017   • Skin ulcer of calf (CMS-HCC) 2015    Dr. Terry and wound care   • Type II or unspecified type diabetes mellitus with neurological manifestations, uncontrolled(250.62) 9/11/2013   • Wound of left leg 2012    Requiring surgery and debridment, Dr. Moore     Past Surgical History:   Procedure Laterality Date   • WOUND CLOSURE GENERAL  4/3/2012    Performed by GERHARD MOORE at SURGERY SAME DAY [facility]   • ANGIOPLASTY  1997    RCA followed by other stents as noted above.    • CATARACT EXTRACTION WITH IOL      bilateral   • LITHOTRIPSY     • TONSILLECTOMY AND ADENOIDECTOMY     • TONSILLECTOMY AND ADENOIDECTOMY       Objective:   Treatments/Interventions Performed:  Compensatory strategy training, Patient/Caregiver education, Cognitive-Linguistic training and Home program    Speech Therapy Assessment:     Expressive Language Assessment:     Ability to exhibit appropriate naming: Minimal  (transient anomias/ 5 items to given letter).    Patient's ability to formulate complex/abstract language is Minimal.    Receptive Language Assessment:     Patient understands complex conversation: Minimal    Cognitive Linguistic Assessment:     Patient attention selective: Moderate    Patient attention divided: Severe    Patient orientation to day: Supervision Required    Patient immediate memory: Moderate    Patient recent and short term memory: Moderate    Patient prospective and time delay memory: Severe (0/5 items recalled)    Patient ability to organize thoughts: Moderate    Patient simple problem solving ability: Moderate    Patient executive functioning ability: Severe    Patient initiation and self monitoring ability: Moderate    Patient spontaneous clock drawing ability: Minimal (decreased hand placement)    Unable to fully assess due to speech and language deficits: No      Speech Therapy Plan :   Prognosis & Recommendations  Impression Summary:  Moderate cognitive-linguistic deficits affecting auditory processing, immediate/short term memory, alternating/selective/divided attn, word fluency slow processing of info. Scored 17/30 on MOCA v. 7.1  Prognosis:  Good  Prognosis Details:  Depression is a barrier  Goals  Short Term Goals:  1.  Pt will recall 4 units of info immediately and answer a mental manipulation question with 80%.       2.  Pt will generate 8-10 items in a category or to given letter in one minute.    3.  Pt will demo visual and auditory divided attention skills 80% min A.     4.  Pt will complete functional time and money math 80% min A.  Short Term Goal Duration (Weeks):  4-6 weeks  Long Term Goals:  1.  Pt will demo functional attention skills for daily tasks and communication with use of strategies.     2.  Pt will demo use of external and internal memory strategies to recall daily events, maintain safety and participate socially.   Long Term Goal Duration (Weeks):  1-2 months  Planned  Therapy Interventions:  Development of Cognitive Skills, Speech/Language training, Home Program, Patient/Caregiver Education and Compensatory Strategy Training,   Frequency:  2x week  Duration (in weeks):  8      Referring provider co-signature:  I have reviewed this plan of care and my co-signature certifies the need for services.  Certification Dates:   From 11/30/17     To 3/1/18    Physician Signature: ________________________________ Date: ______________

## 2017-12-02 LAB
BACTERIA UR CULT: ABNORMAL
SIGNIFICANT IND 70042: ABNORMAL
SOURCE SOURCE: ABNORMAL

## 2017-12-03 DIAGNOSIS — B95.2 ENTEROCOCCUS UTI: ICD-10-CM

## 2017-12-03 DIAGNOSIS — N39.0 ENTEROCOCCUS UTI: ICD-10-CM

## 2017-12-03 RX ORDER — AMOXICILLIN AND CLAVULANATE POTASSIUM 875; 125 MG/1; MG/1
1 TABLET, FILM COATED ORAL 2 TIMES DAILY
Qty: 14 TAB | Refills: 0 | Status: SHIPPED | OUTPATIENT
Start: 2017-12-03 | End: 2017-12-10

## 2017-12-04 ENCOUNTER — PATIENT MESSAGE (OUTPATIENT)
Dept: MEDICAL GROUP | Facility: MEDICAL CENTER | Age: 72
End: 2017-12-04

## 2017-12-04 ENCOUNTER — SPEECH THERAPY (OUTPATIENT)
Dept: SPEECH THERAPY | Facility: REHABILITATION | Age: 72
End: 2017-12-04
Attending: FAMILY MEDICINE
Payer: MEDICARE

## 2017-12-04 ENCOUNTER — OCCUPATIONAL THERAPY (OUTPATIENT)
Dept: OCCUPATIONAL THERAPY | Facility: REHABILITATION | Age: 72
End: 2017-12-04
Attending: FAMILY MEDICINE
Payer: MEDICARE

## 2017-12-04 ENCOUNTER — PHYSICAL THERAPY (OUTPATIENT)
Dept: PHYSICAL THERAPY | Facility: REHABILITATION | Age: 72
End: 2017-12-04
Attending: FAMILY MEDICINE
Payer: MEDICARE

## 2017-12-04 DIAGNOSIS — R53.81 DEBILITY: ICD-10-CM

## 2017-12-04 DIAGNOSIS — R41.841 COGNITIVE COMMUNICATION DEFICIT: ICD-10-CM

## 2017-12-04 DIAGNOSIS — Z86.73 PERSONAL HISTORY OF TRANSIENT CEREBRAL ISCHEMIA: ICD-10-CM

## 2017-12-04 DIAGNOSIS — Z99.3 WHEELCHAIR DEPENDENT: ICD-10-CM

## 2017-12-04 PROCEDURE — 97112 NEUROMUSCULAR REEDUCATION: CPT

## 2017-12-04 PROCEDURE — 97116 GAIT TRAINING THERAPY: CPT | Mod: KX

## 2017-12-04 PROCEDURE — 97110 THERAPEUTIC EXERCISES: CPT | Mod: KX

## 2017-12-04 PROCEDURE — 97140 MANUAL THERAPY 1/> REGIONS: CPT

## 2017-12-04 PROCEDURE — 97532 HCHG COGNITIVE SKILL DEV. 15 MIN: CPT | Mod: KX

## 2017-12-04 ASSESSMENT — ACTIVITIES OF DAILY LIVING (ADL): POOR_BALANCE: 1

## 2017-12-04 NOTE — OP THERAPY DAILY TREATMENT
Dictation #1  MRN:[id number]  CSN:[id number]    Outpatient Physical Therapy  DAILY TREATMENT     [facility] Physical [address]  [address]  [place] [address]  Phone:  [phone]  Fax:  [phone]    Date: 12/04/2017    Patient: Av Bob  YOB: 1945  MRN: [id number]     Time Calculation  Start time: 0833  Stop time: 0930 Time Calculation (min): 57 minutes     Chief Complaint: No chief complaint on file.    Visit #: 5    SUBJECTIVE:  Very fatigued, lab results show new UTI      OBJECTIVE:  Current objective measures:  Sit to stand @ min assist,Min assist gait with forearm trough FWW 1 x 40 feet, w/c to Nu step transfer @ min assist      Therapeutic Exercises (CPT 09481):     Total treatment time spent on Therapeutic Exercises: 45 minutes.      1. Standing balance at mat, 5 x 1 min    2. Standing balance  UE AAROM/ball rolls on mat, 2 x 30    3. Sit to stand , 1 x 15 @ min assist from w/c    4. Nu step L1 15 min    5. Standing balance with fww, 1 x 2 min @ cg asssit    Therapeutic Treatments and Modalities:     1. Gait Training (CPT 90072), 10 min  minutes, 2 x 40 feet with forearm trough fww@ min assist      Pain rating before treatment: 0  Pain rating after treatment: 0    ASSESSMENT:   Response to treatment: Fatigue is limiting standing and ambulation tolerance. Pt required multiple sitting rests between activties.    PLAN/RECOMMENDATIONS:   Plan for treatment: therapy treatment to continue next visit.  Planned interventions for next visit: gait training (CPT 97959), neuromuscular re-education (CPT 67170), therapeutic activities (CPT 69572) and therapeutic exercise (CPT 48397).  Progress gait and standing balance with and without UE support

## 2017-12-04 NOTE — OP THERAPY DAILY TREATMENT
Outpatient Speech Therapy  DAILY TREATMENT     [facility] Speech [address].  [address]  [place] [address]  Phone:  [phone]  Fax:  [phone]    Date: 12/04/2017    Patient: Av Bob  YOB: 1945  MRN: [id number]     Time Calculation  Start time: 0930  Stop time: 1030 Time Calculation (min): 60 minutes     Chief Complaint: Poor Memory    Visit #: 2    Subjective Evaluation Pt notes having a UTI.  Starting meds today.  In fair spirits.     Objective:   Treatments/Interventions Performed:  Cognitive-Linguistic training, Home program and Compensatory strategy training  Other Treatment Interventions:  Cog-lingustic 60 mins  Objective Details:  Pt able to follow 3 component auditory instructions with 90% accuracy given repetitions 40% of the time.  Slow processing noted.  Mod cues to complete basic letter only trail making task, connecting all letters in order.  Pt lost place frequently then became confused about instructions.  60% for alternating atten/attn to new detailed info in context of a math task.  Able to correct errors with min cues.  Reading for details at short paragraph level with good accuracy.       Speech Therapy Assessment:     Expressive Language Assessment:     Ability to exhibit appropriate naming: Supervision Required (anomia x 1).    Receptive Language Assessment:     Patient follows 3 step simple commands: Minimal    Reading Comprehension Assessment:     Patient ability to comprehend simple paragraphs: WFL    Cognitive Linguistic Assessment:     Patient attention divided: Moderate (mod/sev)    Patient recent and short term memory: Moderate    Patient ability to organize thoughts: Moderate        Speech Therapy Plan :   Prognosis: Good  Planned Therapy Interventions:  Home Program, Compensatory Strategy Training, Development of Cognitive Skills and Speech/Language training,   Frequency:  2x week

## 2017-12-04 NOTE — OP THERAPY DAILY TREATMENT
"Outpatient Occupational Therapy   NEUROLOGICAL DAILY TREATMENT     [facility] Occupational Therapy [address].  [address]  [place] [address]  Phone:  [phone]  Fax:  [phone]    Date: 12/04/2017    Patient: Av Bob  YOB: 1945  MRN: [id number]     Time Calculation  Start time: 1030  Stop time: 1130 Time Calculation (min): 60 minutes     Chief Complaint: Extremity Weakness and Hand Weakness    Visit #: 2    Subjective \"I have a UTI, we found out about it today\"    Objective:     Strength:   Upper extremity strength (left):     Shoulder flexion: 1    Shoulder abduction: 1    Fingers flexion: 3    Fingers extension: 2-      Therapeutic Treatments and Modalities:    1. Neuromuscular Re-education (CPT 69719), 45 minutes    2. Manual Therapy (CPT 41369), 15 minutes    Therapeutic Treatments and Modalities Summary: Passive sustained stretch left forearm to digits with focus on wrist extension secondary to significant joint tightness.  Able to achieve 30 degrees of extension, limited by pain.  Vibration applied to left dorsal forearm for facilitation of wrist/digit extensors g.e position, performed active ex with and without external stimulation.  Wrist extension performed with pressure on radial side to focus on activation of ECU, Isolated sequential digit extension \"counting\" x 10 in g.e plane.  Thumb adduction palm flat x 10 reps.  Hand gripper 3 bands 2 sets of 10 reps.  Left shoulder joint mobilization, posterior glides, scapular mobilization.  Passive sustained stretch shoulder ff to 120, abd to 90 limited by pain.  Scapular strengthening dep/ret/elevation with tactile input.  Reviewed HEP with pt/wife with good understanding.      Assessment, Response and Plan:   Impairments: abnormal coordination, abnormal muscle firing, abnormal muscle tone, abnormal or restricted ROM, impaired balance, impaired physical strength, lacks appropriate home exercise program, limited " ADL's and limited mobility    Assessment details:  Pt making slow but steady progress with LUE strength, flexibility, and motor control in response to NM re-ed incorporating facilitory techniques.  Pt easily fatigued but participating to the best of his ability.  Instructed pt/wife on HEP for stretching and active digit strengthening with good understanding.  Prognosis: good      Plan:   Therapy options:  Occupational therapy treatment to continue  Planned therapy interventions:  E Stim Attended (CPT 42616), Manual Therapy (CPT 70538), Therapeutic Exercise (CPT 06175), Therapeutic Activities (CPT 30844), TENS Applicaton (CPT 27087), Self Care ADL Training (CPT 78123) and Neuromuscular Re-education (CPT 14915)  Frequency:  2x week  Duration in weeks:  9  Duration in visits:  18  Discussed with:  Patient

## 2017-12-04 NOTE — TELEPHONE ENCOUNTER
From: Av Bob  To: Mitchell Pantoja M.D.  Sent: 11/30/2017 5:20 PM PST  Subject: Non-Urgent Medical Question    Hi Dr. Pantoja,  The antibiotic Av took for his last UTI was Levofloaxcin 500mg/ one a day for seven days. Also, would it be possible to send Dr. Asha Ley' latest test results and the results of the urine test from today?   Thanks for seeing Av today. We appreciate all of your help..  Jean-Claude Bob

## 2017-12-05 NOTE — TELEPHONE ENCOUNTER
Please fax the lab results from 11/28/17 and 11/30/17 to Dr. Barry's office, per the patient's & wife's request.

## 2017-12-05 NOTE — TELEPHONE ENCOUNTER
From: Av Bob  To: Mitchell Pantjoa M.D.  Sent: 12/4/2017 1:18 PM PST  Subject: Non-Urgent Medical Question    Hi Dr. Pantoja,  Thanks for getting back to us so promptly. Av started the antibiotic today for the UTI . We would appreciate it if your staff could fax the latest tests results to Dr. Barry.  Thank you,  Jean-Claude Bob  ----- Message -----  From: Mitchell Pantoja M.D.  Sent: 12/3/2017 10:15 PM PST  To: Av Bob  Subject: RE: Non-Urgent Medical Question  Greetings Mr. & Mrs. Bob,   Levaquin isn't as good of an option for the current UTI as the Amoxicillin/Clavulanic Acid. Also, I provided you guys with the labs at the latest clinic visits. Would you still like my staff to fax a copy to Dr. Barry's office?    Sincerely,  Dr. Pantoja      ----- Message -----   From: Av Bob   Sent: 11/30/2017 5:20 PM PST   To: Mitchell Pantoja M.D.  Subject: Non-Urgent Medical Question    Hi Dr. Pantoja,  The antibiotic Av took for his last UTI was Levofloaxcin 500mg/ one a day for seven days. Also, would it be possible to send Dr. Asha Ley' latest test results and the results of the urine test from today?   Thanks for seeing Av today. We appreciate all of your help..  Jean-Claude Bob

## 2017-12-06 ENCOUNTER — OCCUPATIONAL THERAPY (OUTPATIENT)
Dept: OCCUPATIONAL THERAPY | Facility: REHABILITATION | Age: 72
End: 2017-12-06
Attending: FAMILY MEDICINE
Payer: MEDICARE

## 2017-12-06 ENCOUNTER — SPEECH THERAPY (OUTPATIENT)
Dept: SPEECH THERAPY | Facility: REHABILITATION | Age: 72
End: 2017-12-06
Attending: FAMILY MEDICINE
Payer: MEDICARE

## 2017-12-06 ENCOUNTER — PHYSICAL THERAPY (OUTPATIENT)
Dept: PHYSICAL THERAPY | Facility: REHABILITATION | Age: 72
End: 2017-12-06
Attending: FAMILY MEDICINE
Payer: MEDICARE

## 2017-12-06 DIAGNOSIS — R53.81 DEBILITY: ICD-10-CM

## 2017-12-06 DIAGNOSIS — Z86.73 PERSONAL HISTORY OF TRANSIENT CEREBRAL ISCHEMIA: ICD-10-CM

## 2017-12-06 DIAGNOSIS — Z99.3 WHEELCHAIR DEPENDENT: ICD-10-CM

## 2017-12-06 DIAGNOSIS — R41.840 COGNITIVE ATTENTION DEFICIT: ICD-10-CM

## 2017-12-06 DIAGNOSIS — Z86.73 HISTORY OF ARTERIAL ISCHEMIC STROKE: ICD-10-CM

## 2017-12-06 PROCEDURE — 97532 HCHG COGNITIVE SKILL DEV. 15 MIN: CPT | Mod: KX

## 2017-12-06 PROCEDURE — 97140 MANUAL THERAPY 1/> REGIONS: CPT

## 2017-12-06 PROCEDURE — 97110 THERAPEUTIC EXERCISES: CPT | Mod: KX

## 2017-12-06 PROCEDURE — 97112 NEUROMUSCULAR REEDUCATION: CPT

## 2017-12-06 PROCEDURE — 97112 NEUROMUSCULAR REEDUCATION: CPT | Mod: KX

## 2017-12-06 ASSESSMENT — ACTIVITIES OF DAILY LIVING (ADL): POOR_BALANCE: 1

## 2017-12-06 NOTE — OP THERAPY DAILY TREATMENT
Outpatient Physical Therapy  DAILY TREATMENT     [facility] Physical [address].  [address]  [place] [address]  Phone:  [phone]  Fax:  [phone]    Date: 12/06/2017    Patient: Av Bob  YOB: 1945  MRN: [id number]     Time Calculation             Chief Complaint: Difficulty Walking    Visit #: 6    SUBJECTIVE:states fatigued from UTI    OBJECTIVE:  Current objective measures: Tired today. MOD A transfer W/C to mat. Sit to stand using quad cane from elevated mat with min. Able to stand 2' before rest. Max A mat to w/c after tx         Therapeutic Exercises (CPT 69673):     Total treatment time spent on Therapeutic Exercises: 15 minutes.      1. UE ex with poles , 20     2. Standing wt. shifts    3. Seated rowing t-band , 20x2    4. Swiss ball roll ups, 10x3              ASSESSMENT:   Response to treatment: very fatigued today. SAO2 96%    PLAN/RECOMMENDATIONS:   Plan for treatment: therapy treatment to continue next visit.  Planned interventions for next visit: gait training (CPT 82759), manual therapy (CPT 32729), neuromuscular re-education (CPT 00426) and therapeutic exercise (CPT 71539).

## 2017-12-06 NOTE — OP THERAPY DAILY TREATMENT
Outpatient Speech Therapy  DAILY TREATMENT     [facility] Speech [address].  [address]  [place] [address]  Phone:  [phone]  Fax:  [phone]    Date: 12/06/2017    Patient: Av Bob  YOB: 1945  MRN: [id number]     Time Calculation  Start time: 0900  Stop time: 1000 Time Calculation (min): 60 minutes     Chief Complaint: Poor Memory and Other (difficulty with attn)    Visit #: 3    Subjective Evaluation  Pt in good spirits.  Excited to see his granddaughters Luis program on Saturday.  Only part of home program completed.     Objective:   Treatments/Interventions Performed:  Cognitive-Linguistic training, Home program and Compensatory strategy training  Other Treatment Interventions:  Cognitive-linguistic 60 mins  Objective Details:  Increased affect today, excited about weekend.  Doing well with initiating conversation appropriately.  Focused on attention/memory/thought organization. Pt able to immediately recall 4 unit info with 75% accuracy, needed repetitions 90% of the time to mentally manipulate info.  Able to answer questions regarding info presented in table form (thought organization) with 67% accuracy, use of visual cues increased effectiveness.  Completed basic trail making task (alphabet only) with min cues, lost place x 1.           Speech Therapy Assessment:     Cognitive Linguistic Assessment:     Patient attention selective: Minimal    Patient attention divided: Severe    Patient immediate memory: Moderate    Patient recent and short term memory: Moderate    Patient prospective and time delay memory: Moderate (mod/sev)    Patient ability to organize thoughts: Moderate    Patient insight to safety awareness: Moderate (decreased to cog deficits)    Patient initiation and self monitoring ability: Minimal        Speech Therapy Plan :   Prognosis: Good  Planned Therapy Interventions:  Cognitive-Linguistic training, Speech/Language training,  Home Program and Compensatory Strategy Training,   Frequency:  2x week

## 2017-12-06 NOTE — OP THERAPY DAILY TREATMENT
"  Outpatient Occupational Therapy   NEUROLOGICAL DAILY TREATMENT     [facility] Occupational Therapy [address].  [address]  [place] [address]  Phone:  [phone]  Fax:  [phone]    Date: 12/06/2017    Patient: Av Bob  YOB: 1945  MRN: [id number]     Time Calculation  Start time: 1000  Stop time: 1100 Time Calculation (min): 60 minutes     Chief Complaint: Extremity Weakness and Hand Weakness    Visit #: 3    Subjective \"I did some of the exercises\"    Objective:     Strength:   Upper extremity strength (left):     Shoulder flexion: 1    Shoulder abduction: 2-    Fingers flexion: 3    Fingers extension: 2-         Therapeutic Treatments and Modalities:    1. Neuromuscular Re-education (CPT 85975), 45 minutes    2. Manual Therapy (CPT 79736), 15 minutes    Therapeutic Treatments and Modalities Summary: Passive sustained stretch left forearm to digits with focus on wrist extension and supination secondary to significant joint tightness.  Able to achieve 30-35 degrees of extension and full supination, limited by pain.  Active composite fist, gross extension a.g. with assist for full extension RF/SF x 10 reps.   Left shoulder joint mobilization, posterior glides, scapular mobilization.  Passive sustained stretch shoulder ff to 120, abd to 90, ER/IR limited by pain.  Scapular retraction x 10 reps with posterior tactile input.  WB on forearm with changes in degrees of elbow flexion, foam roll in hand and tactile input to increase strength of contractions.  Elbow flex/ext table top g.e plane x 15 reps, sh horizontal abd/add to visual targets x 15 with physical assist.  Thumb palmar abduction combined with radial abduction holding foam roll to squeeze x 15 reps. Reviewed importance of stretching with good understanding.      Assessment, Response and Plan:   Impairments: abnormal muscle firing, abnormal muscle tone, abnormal or restricted ROM, impaired balance, impaired " physical strength, lacks appropriate home exercise program, limited ADL's and limited mobility    Assessment details:  Pt making slow but steady progress with LUE strength, joint mobility, and motor control in response to NM re-ed incorporating facilitory techniques.  Pt slightly more animated today.  Reviewed and updated HEP for stretching and active digit strengthening with good understanding.  Prognosis: good      Plan:   Therapy options:  Occupational therapy treatment to continue  Planned therapy interventions:  E Stim Attended (CPT 64885), Manual Therapy (CPT 27019), Therapeutic Exercise (CPT 63371), Therapeutic Activities (CPT 54054), TENS Applicaton (CPT 51011), Self Care ADL Training (CPT 11636) and Neuromuscular Re-education (CPT 10213)  Frequency:  2x week  Duration in weeks:  8  Duration in visits:  17  Discussed with:  Patient

## 2017-12-11 ENCOUNTER — OCCUPATIONAL THERAPY (OUTPATIENT)
Dept: OCCUPATIONAL THERAPY | Facility: REHABILITATION | Age: 72
End: 2017-12-11
Attending: FAMILY MEDICINE
Payer: MEDICARE

## 2017-12-11 ENCOUNTER — PHYSICAL THERAPY (OUTPATIENT)
Dept: PHYSICAL THERAPY | Facility: REHABILITATION | Age: 72
End: 2017-12-11
Attending: FAMILY MEDICINE
Payer: MEDICARE

## 2017-12-11 ENCOUNTER — SPEECH THERAPY (OUTPATIENT)
Dept: SPEECH THERAPY | Facility: REHABILITATION | Age: 72
End: 2017-12-11
Attending: FAMILY MEDICINE
Payer: MEDICARE

## 2017-12-11 DIAGNOSIS — Z86.73 HISTORY OF ARTERIAL ISCHEMIC STROKE: ICD-10-CM

## 2017-12-11 DIAGNOSIS — Z86.73 PERSONAL HISTORY OF TRANSIENT CEREBRAL ISCHEMIA: ICD-10-CM

## 2017-12-11 DIAGNOSIS — R41.841 COGNITIVE COMMUNICATION DEFICIT: ICD-10-CM

## 2017-12-11 DIAGNOSIS — R53.81 DEBILITY: ICD-10-CM

## 2017-12-11 DIAGNOSIS — Z99.3 WHEELCHAIR DEPENDENT: ICD-10-CM

## 2017-12-11 PROCEDURE — 97140 MANUAL THERAPY 1/> REGIONS: CPT

## 2017-12-11 PROCEDURE — 97110 THERAPEUTIC EXERCISES: CPT | Mod: KX

## 2017-12-11 PROCEDURE — 97112 NEUROMUSCULAR REEDUCATION: CPT

## 2017-12-11 PROCEDURE — 97112 NEUROMUSCULAR REEDUCATION: CPT | Mod: KX

## 2017-12-11 PROCEDURE — 97116 GAIT TRAINING THERAPY: CPT | Mod: KX

## 2017-12-11 PROCEDURE — 97532 HCHG COGNITIVE SKILL DEV. 15 MIN: CPT | Mod: KX

## 2017-12-11 PROCEDURE — 97032 APPL MODALITY 1+ESTIM EA 15: CPT

## 2017-12-11 ASSESSMENT — ACTIVITIES OF DAILY LIVING (ADL): POOR_BALANCE: 1

## 2017-12-11 NOTE — OP THERAPY DAILY TREATMENT
Outpatient Speech Therapy  DAILY TREATMENT     [facility] Speech [address].  [address]  [place] [address]  Phone:  [phone]  Fax:  [phone]    Date: 12/11/2017    Patient: Av Bob  YOB: 1945  MRN: [id number]     Time Calculation  Start time: 1402  Stop time: 1500 Time Calculation (min): 58 minutes     Chief Complaint: Poor Memory and Other (attention)    Visit #: 4    Subjective Evaluation Pt in good spirits, had a fun weekend with family.  Good trip to [place] but tired.  Reported last day on antibiotics for UTI.    Objective:   Treatments/Interventions Performed:  Cognitive-Linguistic training, Home program and Compensatory strategy training  Other Treatment Interventions:  Cog-linguistic therapy 58mins  Objective Details:  Min cues for basic divided attention task (trail-making).  Completed attention to details on tables task with 100% (increased from 67%).  Doing better with double checking answers.  Increased difficulty with complex sequencing task, completed with 63% accuracy, able to correct with min cues.          Speech Therapy Assessment:     Cognitive Linguistic Assessment:     Patient attention selective: Minimal    Patient attention divided: Moderate    Patient recent and short term memory: Moderate (repeats stories/questions)    Patient ability to organize thoughts: Minimal (min/mod)    Patient five step sequencing ability: Moderate        Speech Therapy Plan :   Prognosis: Good  Planned Therapy Interventions:  Development of Cognitive Skills, Home Program and Compensatory Strategy Training,   Frequency:  2x week

## 2017-12-11 NOTE — OP THERAPY DAILY TREATMENT
"  Outpatient Occupational Therapy   NEUROLOGICAL DAILY TREATMENT     [facility] Occupational Therapy [address].  [address]  [place] [address]  Phone:  [phone]  Fax:  [phone]    Date: 12/11/2017    Patient: Av Bob  YOB: 1945  MRN: [id number]     Time Calculation  Start time: 0100  Stop time: 0200 Time Calculation (min): 60 minutes     Chief Complaint: Extremity Weakness    Visit #: 4    Subjective \"I've done some stretching but it feels sore\"    Objective:     Strength:   Upper extremity strength (left):     Shoulder flexion: 1    Shoulder abduction: 2-    Fingers flexion: 3    Fingers extension: 2-    Tone, Sensation and Coordination:   Tone:     Left upper extremity muscle tone: Spastic    Modified Deuce:   Upper extremity (left):     Wrist flexors: 1+    Finger flexors: 1+         Therapeutic Treatments and Modalities:    1. Neuromuscular Re-education (CPT 08116), 30 minutes    2. Manual Therapy (CPT 96445), 15 minutes    3. E Stim Attended (CPT 03788), 15 minutes, left shoulder    Therapeutic Treatments and Modalities Summary: Passive sustained stretch left forearm to digits with focus on wrist extension and supination secondary to significant joint tightness.  Active composite fist, separate gross extension from relaxed state a.g.x 10 reps. (OT placed hand in flexed position)  Left shoulder joint mobilization, posterior glides, scapular mobilization.  Passive sustained stretch shoulder ff/abd, ER/IR limited by pain.  Scapular retraction x 10 reps with manual pressure on posterior trunk to increase strength contractions, scapular elevation with associated reactions, resistance to sh adduction with tactile stim on supraspinatus to facilitate sh abd.  WB on forearm with changes in degrees of elbow flexion, foam roll in hand and tactile input to increase strength of contractions, WB forward through extended wrist to facilitate scapular protraction.    NMES " left shoulder x 15 minutes at 40Hz combined with active sh elevation  Reviewed importance of stretching with good understanding.      Assessment, Response and Plan:   Impairments: abnormal muscle firing, abnormal muscle tone, abnormal or restricted ROM, impaired balance, impaired physical strength, lacks appropriate home exercise program, limited ADL's and limited mobility    Assessment details:  Pt making slow but steady progress with LUE strength, joint mobility, and motor control in response to NM re-ed incorporating facilitory techniques and NMES.   Increased digit extension from relaxed state as well as an increase in scapular strength from previous sessions.   Reviewed and updated HEP for stretching and active digit strengthening with good understanding.  Prognosis: good      Plan:   Therapy options:  Occupational therapy treatment to continue  Planned therapy interventions:  E Stim Attended (CPT 42352), Manual Therapy (CPT 59587), Therapeutic Exercise (CPT 49685), Therapeutic Activities (CPT 87918), TENS Applicaton (CPT 13897), Self Care ADL Training (CPT 32595) and Neuromuscular Re-education (CPT 27663)  Frequency:  2x week  Duration in weeks:  8  Duration in visits:  16  Discussed with:  Patient

## 2017-12-11 NOTE — OP THERAPY DAILY TREATMENT
Outpatient Physical Therapy  DAILY TREATMENT     [facility] Physical Therapy [address].  [address]  [place] [address]  Phone:  [phone]  Fax:  [phone]    Date: 12/11/2017    Patient: Av Bob  YOB: 1945  MRN: [id number]     Time Calculation  Start time: 1100  Stop time: 1155 Time Calculation (min): 55 minutes     Chief Complaint: Difficulty Walking    Visit #: 7    SUBJECTIVE:  Still tired from UTI but had a little more endurance than previous visit    OBJECTIVE:  Current objective measures: Ambulated 30'x2 with FWW. Took forearm platform off. Instead, assisted with (L) hand placement on walker . Mod sit to stand., min with ambulation         Therapeutic Exercises (CPT 76312):     Total treatment time spent on Therapeutic Exercises: 30 minutes.        Therapeutic Exercise Summary: Neuro facilitation to (L) UE,LE in sitting and sidelying. Practiced standing/ wt. Shifts, pre-gait activities (25')              ASSESSMENT:   Response to treatment: May benefit from PAFO, removal of foreaem platform    PLAN/RECOMMENDATIONS:   Plan for treatment: therapy treatment to continue next visit.  Planned interventions for next visit: continue with current treatment, gait training (CPT 65455), manual therapy (CPT 82385), neuromuscular re-education (CPT 08763), therapeutic activities (CPT 34643) and therapeutic exercise (CPT 61452).

## 2017-12-12 ENCOUNTER — HOSPITAL ENCOUNTER (OUTPATIENT)
Dept: LAB | Facility: MEDICAL CENTER | Age: 72
End: 2017-12-12
Attending: FAMILY MEDICINE
Payer: MEDICARE

## 2017-12-12 ENCOUNTER — HOSPITAL ENCOUNTER (OUTPATIENT)
Dept: LAB | Facility: MEDICAL CENTER | Age: 72
End: 2017-12-12
Attending: INTERNAL MEDICINE
Payer: MEDICARE

## 2017-12-12 DIAGNOSIS — R79.9 ELEVATED BUN: ICD-10-CM

## 2017-12-12 DIAGNOSIS — E83.42 HYPOMAGNESEMIA: ICD-10-CM

## 2017-12-12 DIAGNOSIS — N18.30 CKD (CHRONIC KIDNEY DISEASE) STAGE 3, GFR 30-59 ML/MIN (HCC): ICD-10-CM

## 2017-12-12 LAB
ALBUMIN SERPL BCP-MCNC: 3.3 G/DL (ref 3.2–4.9)
ALBUMIN/GLOB SERPL: 1.3 G/DL
ALP SERPL-CCNC: 80 U/L (ref 30–99)
ALT SERPL-CCNC: 18 U/L (ref 2–50)
ANION GAP SERPL CALC-SCNC: 7 MMOL/L (ref 0–11.9)
AST SERPL-CCNC: 24 U/L (ref 12–45)
BASOPHILS # BLD AUTO: 0.6 % (ref 0–1.8)
BASOPHILS # BLD: 0.07 K/UL (ref 0–0.12)
BILIRUB SERPL-MCNC: 0.6 MG/DL (ref 0.1–1.5)
BUN SERPL-MCNC: 28 MG/DL (ref 8–22)
CA-I SERPL-SCNC: 1.3 MMOL/L (ref 1.1–1.3)
CALCIUM SERPL-MCNC: 9.3 MG/DL (ref 8.5–10.5)
CHLORIDE SERPL-SCNC: 102 MMOL/L (ref 96–112)
CO2 SERPL-SCNC: 27 MMOL/L (ref 20–33)
CREAT SERPL-MCNC: 1.27 MG/DL (ref 0.5–1.4)
EOSINOPHIL # BLD AUTO: 0.14 K/UL (ref 0–0.51)
EOSINOPHIL NFR BLD: 1.2 % (ref 0–6.9)
ERYTHROCYTE [DISTWIDTH] IN BLOOD BY AUTOMATED COUNT: 47.4 FL (ref 35.9–50)
GFR SERPL CREATININE-BSD FRML MDRD: 56 ML/MIN/1.73 M 2
GLOBULIN SER CALC-MCNC: 2.6 G/DL (ref 1.9–3.5)
GLUCOSE SERPL-MCNC: 224 MG/DL (ref 65–99)
HCT VFR BLD AUTO: 40.5 % (ref 42–52)
HGB BLD-MCNC: 13.1 G/DL (ref 14–18)
IMM GRANULOCYTES # BLD AUTO: 0.08 K/UL (ref 0–0.11)
IMM GRANULOCYTES NFR BLD AUTO: 0.7 % (ref 0–0.9)
LDH SERPL-CCNC: 127 U/L (ref 107–266)
LYMPHOCYTES # BLD AUTO: 0.83 K/UL (ref 1–4.8)
LYMPHOCYTES NFR BLD: 7.4 % (ref 22–41)
MAGNESIUM SERPL-MCNC: 1.5 MG/DL (ref 1.5–2.5)
MCH RBC QN AUTO: 28.2 PG (ref 27–33)
MCHC RBC AUTO-ENTMCNC: 32.3 G/DL (ref 33.7–35.3)
MCV RBC AUTO: 87.3 FL (ref 81.4–97.8)
MONOCYTES # BLD AUTO: 0.62 K/UL (ref 0–0.85)
MONOCYTES NFR BLD AUTO: 5.5 % (ref 0–13.4)
NEUTROPHILS # BLD AUTO: 9.53 K/UL (ref 1.82–7.42)
NEUTROPHILS NFR BLD: 84.6 % (ref 44–72)
NRBC # BLD AUTO: 0 K/UL
NRBC BLD AUTO-RTO: 0 /100 WBC
PLATELET # BLD AUTO: 430 K/UL (ref 164–446)
PMV BLD AUTO: 10.2 FL (ref 9–12.9)
POTASSIUM SERPL-SCNC: 4.3 MMOL/L (ref 3.6–5.5)
PROT SERPL-MCNC: 5.9 G/DL (ref 6–8.2)
RBC # BLD AUTO: 4.64 M/UL (ref 4.7–6.1)
SODIUM SERPL-SCNC: 136 MMOL/L (ref 135–145)
WBC # BLD AUTO: 11.3 K/UL (ref 4.8–10.8)

## 2017-12-12 PROCEDURE — 85025 COMPLETE CBC W/AUTO DIFF WBC: CPT

## 2017-12-12 PROCEDURE — 83615 LACTATE (LD) (LDH) ENZYME: CPT

## 2017-12-12 PROCEDURE — 82330 ASSAY OF CALCIUM: CPT

## 2017-12-12 PROCEDURE — 80053 COMPREHEN METABOLIC PANEL: CPT

## 2017-12-12 PROCEDURE — 36415 COLL VENOUS BLD VENIPUNCTURE: CPT

## 2017-12-12 PROCEDURE — 83735 ASSAY OF MAGNESIUM: CPT

## 2017-12-13 ENCOUNTER — OFFICE VISIT (OUTPATIENT)
Dept: BEHAVIORAL HEALTH | Facility: PHYSICIAN GROUP | Age: 72
End: 2017-12-13
Payer: MEDICARE

## 2017-12-13 DIAGNOSIS — F43.21 ADJUSTMENT DISORDER WITH DEPRESSED MOOD: ICD-10-CM

## 2017-12-13 PROCEDURE — 90791 PSYCH DIAGNOSTIC EVALUATION: CPT | Performed by: PSYCHOLOGIST

## 2017-12-14 ENCOUNTER — OFFICE VISIT (OUTPATIENT)
Dept: MEDICAL GROUP | Facility: MEDICAL CENTER | Age: 72
End: 2017-12-14
Payer: MEDICARE

## 2017-12-14 VITALS
TEMPERATURE: 96.8 F | DIASTOLIC BLOOD PRESSURE: 74 MMHG | SYSTOLIC BLOOD PRESSURE: 118 MMHG | OXYGEN SATURATION: 100 % | HEART RATE: 63 BPM

## 2017-12-14 DIAGNOSIS — B95.2 ENTEROCOCCUS UTI: ICD-10-CM

## 2017-12-14 DIAGNOSIS — E83.42 HYPOMAGNESEMIA: ICD-10-CM

## 2017-12-14 DIAGNOSIS — N19 ACUTE PRERENAL AZOTEMIA: ICD-10-CM

## 2017-12-14 DIAGNOSIS — N39.0 ENTEROCOCCUS UTI: ICD-10-CM

## 2017-12-14 DIAGNOSIS — E11.9 CONTROLLED TYPE 2 DIABETES MELLITUS WITHOUT COMPLICATION, WITHOUT LONG-TERM CURRENT USE OF INSULIN (HCC): ICD-10-CM

## 2017-12-14 DIAGNOSIS — Z12.11 COLON CANCER SCREENING: ICD-10-CM

## 2017-12-14 DIAGNOSIS — N18.30 CKD (CHRONIC KIDNEY DISEASE) STAGE 3, GFR 30-59 ML/MIN (HCC): ICD-10-CM

## 2017-12-14 DIAGNOSIS — E11.21 DIABETIC NEPHROPATHY ASSOCIATED WITH TYPE 2 DIABETES MELLITUS (HCC): ICD-10-CM

## 2017-12-14 PROBLEM — F43.21 ADJUSTMENT DISORDER WITH DEPRESSED MOOD: Status: ACTIVE | Noted: 2017-12-14

## 2017-12-14 PROBLEM — J00 COMMON COLD: Status: RESOLVED | Noted: 2017-11-30 | Resolved: 2017-12-14

## 2017-12-14 PROCEDURE — 99215 OFFICE O/P EST HI 40 MIN: CPT | Performed by: FAMILY MEDICINE

## 2017-12-14 RX ORDER — SACCHAROMYCES BOULARDII 250 MG
250 CAPSULE ORAL 2 TIMES DAILY
Status: ON HOLD | COMMUNITY
End: 2018-02-12

## 2017-12-14 RX ORDER — MAGNESIUM OXIDE 400 MG/1
400 TABLET ORAL 3 TIMES DAILY
COMMUNITY
End: 2018-10-19 | Stop reason: SDUPTHER

## 2017-12-14 NOTE — BH THERAPY
[facility]  INITIAL ASSESSMENT    Name: Av Bob  MRN: [id number]  : 1945  Age: 72 y.o.  Date of assessment: 2017  PCP: Mitchell Pantoja M.D.  Persons in attendance: Patient  Total session time: 45 minutes      CHIEF COMPLAINT AND HISTORY OF PRESENTING PROBLEM:  (as stated by Patient):  Av Bob is a 72 y.o.,  White male referred for assessment by Mitchell Pantoja M.D..  Client reports periodic depression over the last year since he was diagnosed with lymphoma. He states that he went through treatment and is currently cancer-free but in  he contracted a virus while in the hospital and almost . Client reports that he was in ICU for an extended time and is now confined to a wheelchair and can't walk. Client reports depressive symptoms including depressed mood, tearfulness, and reduced appetite.   Chief Complaint   Patient presents with   • Depression   .     FAMILY/SOCIAL HISTORY  Current living situation/household members: Wife  Relevant family history/structure/dynamics: Client was born in [place], [place] and raised in [place], [place]. He was raised by both parents and he has one younger brother (1 year younger). Client describes his childhood as good and he denies any history of abuse. His father passed away at age 47 from a heart attack and his mother is still alive and living in [place], [place]. Client's brother lives in [place] and client has a good relationship with him. Client graduated from high school and served 6 years in the [employer]. After that he attended college at the [employer] and obtained his degree in Administration of Justice. 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 After the restaurant closed, client worked for [employer] until his [employer]. Client  his first wife in his twenties. They were  for  7 years and had one daughter. Client also has a stepdaughter from this marriage. After their divorce, client was single for many years until he met his current wife. They have been  for 14 years and she has one son.   Current family/social stressors: Client's health problems  Quality/quantity of current family and/or social support: Good-wife is very supportive  Does patient/parent report a family history of behavioral health issues, diagnoses, or treatment? No  Family History   Problem Relation Age of Onset   • Heart Disease Father      CAD   • Diabetes Father    • Cancer Mother    • Psychiatry Mother      Depression   • Kidney stones Brother    • Heart Disease Brother    • Psychiatry Brother      Depression        BEHAVIORAL HEALTH TREATMENT HISTORY  Does patient/parent report a history of prior behavioral health treatment for patient? No:    History of untreated behavioral health issues identified? No    MEDICAL HISTORY  Primary care behavioral health screenings: Patient Health Questionaire          If depressive symptoms identified deferred to follow up visit unless specifically addressed in assesment and plan.    Interpretation of PHQ-9 Total Score   Score Severity   1-4 No Depression   5-9 Mild Depression   10-14 Moderate Depression   15-19 Moderately Severe Depression   20-27 Severe Depression       Past medical/surgical history:   Past Medical History:   Diagnosis Date   • Acute respiratory failure with hypoxia (CMS-HCC) 5/20/2017   • Cerebrovascular accident (CVA) (CMS-HCC) 12/30/2016    Left hemiparesis, etiology of stroke not established, lymphoma discovered on MRI evaluation of stroke   • CKD (chronic kidney disease) stage 3, GFR 30-59 ml/min    • Controlled gout 2014   • Coronary atherosclerosis of native coronary artery    • Enterococcal septicemia (CMS-HCC) 8/12/2017   • Hypertension    • Hypokalemia 2012    controlled with combination of ACE inhibitor or ARB plus spironolactone   •  Hypomagnesemia 08/12/2017    etiology uncertain   • Leg wound, right 9/2016    Followed in wound care   • 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 Barry   • NSTEMI (non-ST elevated myocardial infarction) (CMS-HCC) 07/18/2017    complicating UTI with sepsis   • Polyneuropathy in diabetes(357.2) 9/11/2013   • Presence of drug coated stent in LAD coronary artery     Overlapping Stowell 3.0 stents, 7/10/2009    • Presence of drug coated stent in left circumflex coronary artery     Overlapping 2.5mm Taxus and Stowell stents in OM, 7/10/2009    • S/P PTCA (percutaneous transluminal coronary angioplasty), RCA, 5/1997, patent 7/10/2009    • Septic shock (CMS-HCC) 5/20/2017   • Skin ulcer of calf (CMS-HCC) 2015    Dr. Terry and wound care   • Type II or unspecified type diabetes mellitus with neurological manifestations, uncontrolled(250.62) 9/11/2013   • Wound of left leg 2012    Requiring surgery and debridment, Dr. Moore      Past Surgical History:   Procedure Laterality Date   • WOUND CLOSURE GENERAL  4/3/2012    Performed by GERHARD MOORE at SURGERY [facility]   • ANGIOPLASTY  1997    RCA followed by other stents as noted above.    • CATARACT EXTRACTION WITH IOL      bilateral   • LITHOTRIPSY     • TONSILLECTOMY AND ADENOIDECTOMY     • TONSILLECTOMY AND ADENOIDECTOMY          Medication Allergies:  Diphenhydramine hcl; Lorazepam; Ciprofloxacin; and Nkda [no known drug allergy]   Medical history provided by patient during current evaluation: Client has an extensive medical history including a heart attack at age 52 and 5 subsequent stent placements, Type 2 diabetes, Lymphoma diagnosed in 1/17 and treated with chemotherapy, a hospital-acquired infection in 6/17 that resulted in an ICU stay and client currently being confined to a wheelchair, kidney disease and gout.    Patient reports last physical exam:  11/2017  Does patient/parent report any history of or current developmental concerns? No  Does patient/parent report nutritional concerns? No  Does patient/parent report change in appetite or weight loss/gain? Yes  Does patient/parent report history of eating disorder symptoms? No  Does patient/parent report dental problem? No  Does patient/parent report physical pain? No   Indicate if pain is acute or chronic, and location: N/a    Pain scale rating:       Does patient/parent report functional impact of medical, developmental, or pain issues?   no    EDUCATIONAL/LEARNING HISTORY  Is patient currently enrolled in a school/educational program?   No:   Highest grade level completed: College  School performance/functioning: Good  History of Special Education/repeated grades/learning issues: no  Preferred learning style: N/A  Current learning needs (large print, language barrier, etc):  None      EMPLOYMENT/RESOURCES  Is the patient currently employed? No  Does the patient/parent report adequate financial resources? Yes  Does patient identify impact of presenting issue on work functioning? No  Work or income-related stressors:  None     HISTORY:  Does patient report current or past enlistment? Yes    [If yes, complete below items]  Does patient report history of exposure to combat? Unknown  Does patient report history of  sexual trauma? No  Does patient report other -related stressors? No    SPIRITUAL/CULTURAL/IDENTITY:  What are the patient’s/family’s spiritual beliefs or practices? Baptist  What is the patient’s cultural or ethnic background/identity?   How does the patient identify their sexual orientation? Heterosexual  How does the patient identify their gender? Male  Does the patient identify any spiritual/cultural/identity factors as relevant to the presenting issue? No    LEGAL HISTORY  Has the patient ever been involved with juvenile, adult, or family legal systems? No   [If  yes, trigger section below:]  Does patient report ever being a victim of a crime?  No  Does patient report involvement in any current legal issues?  No  Does patient report ever being arrested or committing a crime? No  Does patient report any current agency (parole/probation/CPS/) involvement? No    ABUSE/NEGLECT/TRAUMA SCREENING  Does patient report feeling “unsafe” in his/her home, or afraid of anyone? No  Does patient report any history of physical, sexual, or emotional abuse? No  Does parent or significant other report any of the above? N/A  Is there evidence of neglect by self? No  Is there evidence of neglect by a caregiver? No  Does the patient/parent report any history of CPS/APS/police involvement related to suspected abuse/neglect or domestic violence? No  Does the patient/parent report any other history of potentially traumatic life events? No  Based on the information provided during the current assessment, is a mandated report of suspected abuse/neglect being made?  No     SAFETY ASSESSMENT - SELF  Does patient acknowledge current or past symptoms of dangerousness to self? No  Does parent/significant other report patient has current or past symptoms of dangerousness to self? N/A      Recent change in frequency/specificity/intensity of suicidal thoughts or self-harm behavior? No  Current access to firearms, medications, or other identified means of suicide/self-harm? No  If yes, willing to restrict access to means of suicide/self-harm? N/A  Protective factors present: Future-oriented, Good impulse control and Positive coping skills    Current Suicide Risk: Low  Crisis Safety Plan completed and copy given to patient: No    SAFETY ASSESSMENT - OTHERS  Does paor past symptoms of aggressive behavior or risk to others? No  Does parent/significant othtient acknowledge current or past symptoms of aggressive behavior or risk to others? N/A  Does parent/significant other report patient has current  or past symptoms of aggressive behavior or risk to others? N/A    Recent change in frequency/specificity/intensity of thoughts or threats to harm others? No  Current access to firearms/other identified means of harm? No  If yes, willing to restrict access to weapons/means of harm? N/A  Protective factors present: Good frustration tolerance, Well-developed sense of empathy and Stable relationships    Current Homicide Risk:  Low  Crisis Safety Plan completed and copy given to patient? No  Based on information provided during the current assessment, is a mandated “duty to warn” being exercised? No    SUBSTANCE USE/ADDICTION HISTORY  [] Not applicable - patient 10 years of age or younger    Is there a family history of substance use/addiction? No  Does patient acknowledge or parent/significant other report use of/dependence on substances? No  Last time patient used alcohol: Never  Within the past week? No  Last time patient used marijuana: Never  Within the past month? No  Any other street drugs ever tried even once? No  Any use of prescription medications/pills without a prescription, or for reasons others than originally prescribed?  No  Any other addictive behavior reported (gambling, shopping, sex)? No     Drug History:  Amphetamine:Amphetamine frequency: Never used      Cannibis:  Cannabis frequency: Never used      Cocaine:  Cocaine frequency: Never used      Ecstasy:      Hallucinogen:  Hallucinogen frequency: Never used      Inhalant:   Inhalant frequency: Never used      Opiate:  Opiate frequency: Never used  Cannabis frequency: Never used      Other:  Other drug frequency: Never used      Sedative:   Sedative frequency: Never used          What consequences does the patient associate with any of the above substance use and or addictive behaviors? None    Patient’s motivation/readiness for change: N/A    [x] Patient denies use of any substance/addictive behaviors    STRENGTHS/ASSETS  Strengths Identified by  interviewer: Family suppport, Stable relationships, Effeectively addressed past stressors/challenges and Sense of humor  Strengths Identified by patient: Being a leader and not having any vices    MENTAL STATUS/OBSERVATIONS   Participation: Active verbal participation  Grooming: Casual  Orientation:Fully Oriented   Behavior: Calm  Eye contact: Good   Mood:Euthymic  Affect:Constricted  Thought process: Logical and Goal-directed  Thought content:  Within normal limits  Speech: Rate within normal limits and Volume within normal limits  Perception: Within normal limits  Memory: No gross evidence of memory deficits  Insight: Adequate  Judgment:  Good  Other:    Family/couple interaction observations: N/A    RESULTS OF SCREENING MEASURES:  [x] Not applicable  Measure:   Score:     Measure:   Score:       CLINICAL FORMULATION: Client presents with intermittent depression that began last year after he was diagnosed with lymphoma and has become increasingly worse since he almost  from an infection he acquired during a hospital stay in . Client is currently unable to walk and is confined to a wheelchair and he states that this is his biggest frustration and stressor. He reports depressive symptoms including depressed mood, tearfulness, and reduced appetite.       DIAGNOSTIC IMPRESSION(S):  1. Adjustment disorder with depressed mood          IDENTIFIED NEEDS/PLAN:  [If any of these marked, trigger DISPOSITION list]  Mood/anxiety  Refer to [facility]: Outpatient Therapy    Does patient express agreement with the above plan? Yes     Referral appointment(s) scheduled? Yes       Mariella Levi, Ph.D.

## 2017-12-15 PROBLEM — N19 ACUTE PRERENAL AZOTEMIA: Status: ACTIVE | Noted: 2017-11-21

## 2017-12-15 NOTE — ASSESSMENT & PLAN NOTE
Acute, stable, controlled, resolved. Please see notes from same date of service 12/14/2017 Re: regarding Acute prerenal azotemia

## 2017-12-15 NOTE — PROGRESS NOTES
"Subjective:   Chief Complaint/History of Present Illness:  Av Bob is a 72 y.o. male established patient who presents today to discuss the following medical conditions:    Elevated BUN  Patient with continued elevated BUN, however resolved elevated creatinine. Therefore, patient's acute kidney failure is resolving. This is after discontinuation of metformin, increase of fluids intake, as well as treatment with antibiotics for his Escherichia coli UTI.  Patient has completed his antibiotics, and we'll continue his increased fluids intake    ROS is NEGATIVE for fevers, chills, rigors, flank pain, dysuria, hematuria, pyuria, polyuria, increased frequency of urination, diarrhea, constipation    Enterococcus UTI  Acute, stable, controlled, resolved. Please see notes from same date of service 12/14/2017 Re: regarding elevated BUN.    CKD (chronic kidney disease) stage 3, GFR 30-59 ml/min  Patient with combination of chronic kidney disease as well as diabetic nephropathy, which complicates his renal health. These likely contributed to the acute kidney injury which was previously experiencing, please see notes from same date of service 12/14/2017 Re: Elevated BUN.    Diabetic nephropathy associated with type 2 diabetes mellitus (CMS-[facility])  Please see notes from same date of service 12/14/2017 regarding CKD 3.    Hypomagnesemia  After review of most recent labs, this has resolved on patient's \"half\" of magnesium oxide that he is taking, as well as cessation of metformin, treatment of UTI, and increase fluid intake. Patient is instructed to continue taking his magnesium as directed.      Patient Active Problem List    Diagnosis Date Noted   • Adjustment disorder with depressed mood 12/14/2017   • Enterococcus UTI 11/30/2017   • Diabetic nephropathy associated with type 2 diabetes mellitus (CMS-HCC) 11/30/2017   • Elevated BUN 11/21/2017   • Loose stools 11/21/2017   • Psychophysiological insomnia 11/21/2017   • " Severe episode of recurrent major depressive disorder, without psychotic features (CMS-HCC) 11/07/2017   • Wheelchair dependent 11/07/2017   • Hypomagnesemia 08/12/2017   • Obstruction of left ureteropelvic junction due to stone 07/18/2017   • Paroxysmal atrial fibrillation (CMS-HCC) 05/23/2017   • Normocytic hypochromic anemia 05/20/2017   • Diffuse large cell non-Hodgkin's lymphoma (CMS-HCC) 05/08/2017   • Essential hypertension, benign 03/22/2017   • History of arterial ischemic stroke 03/22/2017   • Controlled type 2 diabetes mellitus without complication, without long-term current use of insulin (CMS-HCC) 12/30/2016   • CKD (chronic kidney disease) stage 3, GFR 30-59 ml/min 08/25/2016   • History of myocardial infarction 05/07/2014   • Atherosclerosis of native coronary artery of native heart    • Idiopathic chronic gout of multiple sites without tophus 01/28/2014   • Diabetic polyneuropathy (CMS-HCC) 09/11/2013   • History of nephrolithiasis    • Presence of drug coated stent in LAD coronary artery 12/13/2011   • Presence of drug coated stent in left circumflex coronary artery 12/13/2011   • S/P PTCA (percutaneous transluminal coronary angioplasty), RCA, 5/1997, patent 7/10/2009 12/13/2011   • Mixed hyperlipidemia 12/13/2011   • Benign hypertensive heart disease without heart failure 12/13/2011       Additional History:   Allergies:    Diphenhydramine hcl; Lorazepam; Ciprofloxacin; and Nkda [no known drug allergy]     Current Medications:     Current Outpatient Prescriptions   Medication Sig Dispense Refill   • magnesium oxide (MAG-OX) 400 MG Tab Take 400 mg by mouth 3 times a day.     • vitamin D (CHOLECALCIFEROL) 1000 UNIT Tab Take 2,000 Units by mouth every day.     • saccharomyces boulardii (FLORASTOR) 250 MG Cap Take 250 mg by mouth 2 Times a Day.     • metoprolol (TOPROL-XL) 200 MG XL tablet Take 1 Tab by mouth every day. 90 Tab 3   • Melatonin 5 MG Tab Take 1-2 Tabs by mouth at bedtime as needed.     •  fenofibrate (TRICOR) 145 MG Tab Take 1 Tab by mouth every day. 90 Tab 3   • tramadol (ULTRAM) 50 MG Tab Take 50 mg by mouth every four hours as needed.     • losartan (COZAAR) 50 MG Tab Take 1 Tab by mouth every day. 30 Tab 6   • spironolactone (ALDACTONE) 50 MG Tab Take 1 Tab by mouth every day. 90 Tab 3   • Insulin Glargine (TOUJEO SOLOSTAR SC) Inject 15 Units as instructed every evening. use 15 units nightly according to endocrenologist instruction on 3/23/17     • loperamide (IMODIUM A-D) 2 MG Cap Take 2 mg by mouth 4 times a day as needed for Diarrhea.     • aspirin 81 MG tablet Take 81 mg by mouth every day.     • Acetaminophen 325 MG Cap Take 650 mg by mouth 4 times a day as needed (Pain).     • fluoxetine (PROZAC) 20 MG Cap Take 40 mg by mouth every day.     • insulin lispro, Human, (HUMALOG) 100 UNIT/ML Solution Pen-injector injection Using up to 50 units per day as directed. 45 mL 2   • nystatin (MYCOSTATIN) Powder Apply 1 Dose to affected area(s) 3 times a day as needed. groin rash     • ascorbic acid (VITAMIN C) 500 MG/5ML syrup Take 500 mg by mouth every day.     • atorvastatin (LIPITOR) 40 MG Tab Take 0.5 Tabs by mouth every day. 90 Tab 3   • clopidogrel (PLAVIX) 75 MG Tab Take 1 Tab by mouth every day. 90 Tab 3   • allopurinol (ZYLOPRIM) 300 MG Tab Take 1 Tab by mouth every day. 90 Tab 3   • nitroglycerin (NITROSTAT) 0.4 MG SL Tab Place 1 Tab under tongue as needed for Chest Pain. 25 Tab 6   • B-D UF III MINI PEN NEEDLES 31G X 5 MM Misc USE AS DIRECTED WITH INSULIN INJECTIONS 450 Each 2   • CENTRUM SILVER PO Take 1 Tab by mouth every day.       No current facility-administered medications for this visit.         Social History:     Social History   Substance Use Topics   • Smoking status: Never Smoker   • Smokeless tobacco: Never Used   • Alcohol use No       ROS:     - NOTE: All other systems reviewed and are negative, except as in HPI.     Objective:   Physical Exam:    Vitals: Blood pressure  118/74, pulse 63, temperature 36 °C (96.8 °F), SpO2 100 %.   BMI: There is no height or weight on file to calculate BMI.   General/Constitutional: Vitals as above, Well nourished, well developed male in no acute distress   Head/Eyes: Head is grossly normal & atraumatic, bilateral conjunctivae clear and not injected, bilateral EOMI, bilateral PERRL   ENT: Bilateral external ears grossly normal in appearance, Hearing grossly intact, External nares normal in appearance and without discharge/bleeding              Respiratory: No respiratory distress, bilateral lungs are clear to ausculation in all lung fields (anterior/lateral/posterior), no wheezing/rhonchi/rales              Cardiovascular: Regular rate and rhythm without murmur/gallops/rubs, distal pulses are intact and equal bilaterally (radial, posterior tibial), no bilateral lower extremity edema              MSK: Patient wheelchair dependent at present time, has abnormal gait due to left-sided hemiplegia.              Integumentary: No apparent rashes   Diabetic Foot Exam: No ulcers/laceration/blisters present on bilateral feet, normal gross anatomy of bilateral feet without abnormal curvature or arch, no toe deformities, no toenail thickness, no ingrown toenails, no increase in skin temperature bilaterally, no skin erythema to bilateral feet, bilateral dorsalis pedis and posterior tibial pulses 2+ and equal, Refill less than 2 seconds bilaterally, Trego 10 g monofilament testing normal in right foot (great toe, ball of feet at medial/lateral/mid ball of foot) as well as in left foot ball of foot at lateral and mid ball of foot, but abnormal gail 10g monofilament testing at left foot great toe and ball of foot at medial edge of food.              Psych: Judgment grossly appropriate, apparent depression depression/anxiety    Health Maintenance:     - Diabetic foot exam completed today    - Patient states that he will transferred to Dr. Cox's office  and have him perform diabetic retinal exam    - FIT ordered as below    Imaging/Labs:     - 12/12/17 -- magnesium and calcium are normal    - 11/30/17 -- E faecaelis UTI + FeNa 0.63% (prerenal) --> increase fluid intake, take augmentin    Assessment and Plan:   1. Acute prerenal azotemia  Acute, uncontrolled, resolving.  Patient with continued BUN elevation, instructed to continue with increased fluid intake.  BMP in ~1month to recheck, to give sufficient time after laser lithotripsy that will occur in early January.   - BASIC METABOLIC PANEL; Future    2. Enterococcus UTI  Acute, resolved.  Patient completed antibiotics, and urine is normal color and no s/sx.    3. CKD (chronic kidney disease) stage 3, GFR 30-59 ml/min  4. Diabetic nephropathy associated with type 2 diabetes mellitus (CMS-HCC)  Chronic, uncontrolled.  Recheck labs as below   - BASIC METABOLIC PANEL; Future   - MAGNESIUM; Future    5. Hypomagnesemia  Acute, resolved, patient to continue with current dosage of magnesium.   - MAGNESIUM; Future    6. Controlled type 2 diabetes mellitus without complication, without long-term current use of insulin (CMS-HCC)  Chronic, stable, well-controlled. Diabetic monofilament foot exam as below.  Diabetic retinal exam to be performed by Dr. Cox.   - Diabetic Monofilament Lower Extremity Exam    7. Colon cancer screening  Chronic, unknown control, FIT test.   - OCCULT BLOOD FECES IMMUNOASSAY (FIT); Future    NOTE: A total of 40minutes was spent in direct face-to-face time with the patient, of which over 50% of the time was spent in counseling and/or coordination of care, the contents of which are described in this note.    RTC: in 4-6weeks for labs review + re-evaluation after laser lithotripsy.    PLEASE NOTE: This dictation was created using voice recognition software. I have made every reasonable attempt to correct obvious errors, but I expect that there are errors of grammar and possibly content that I  did not discover before finalizing the note.

## 2017-12-15 NOTE — ASSESSMENT & PLAN NOTE
Patient with combination of chronic kidney disease as well as diabetic nephropathy, which complicates his renal health. These likely contributed to the acute kidney injury which was previously experiencing, please see notes from same date of service 12/14/2017 Re: Acute prerenal azotemia

## 2017-12-15 NOTE — ASSESSMENT & PLAN NOTE
Patient with continued elevated BUN, however resolved elevated creatinine. Therefore, patient's acute kidney failure is resolving. This is after discontinuation of metformin, increase of fluids intake, as well as treatment with antibiotics for his Escherichia coli UTI.  Patient has completed his antibiotics, and we'll continue his increased fluids intake    ROS is NEGATIVE for fevers, chills, rigors, flank pain, dysuria, hematuria, pyuria, polyuria, increased frequency of urination, diarrhea, constipation

## 2017-12-15 NOTE — ASSESSMENT & PLAN NOTE
"After review of most recent labs, this has resolved on patient's \"half\" of magnesium oxide that he is taking, as well as cessation of metformin, treatment of UTI, and increase fluid intake. Patient is instructed to continue taking his magnesium as directed.  "

## 2017-12-18 RX ORDER — FLUOXETINE HYDROCHLORIDE 20 MG/1
40 CAPSULE ORAL DAILY
Qty: 90 CAP | Refills: 0 | Status: SHIPPED | OUTPATIENT
Start: 2017-12-18 | End: 2018-01-30 | Stop reason: SDUPTHER

## 2017-12-19 ENCOUNTER — OCCUPATIONAL THERAPY (OUTPATIENT)
Dept: OCCUPATIONAL THERAPY | Facility: REHABILITATION | Age: 72
End: 2017-12-19
Attending: FAMILY MEDICINE
Payer: MEDICARE

## 2017-12-19 ENCOUNTER — PHYSICAL THERAPY (OUTPATIENT)
Dept: PHYSICAL THERAPY | Facility: REHABILITATION | Age: 72
End: 2017-12-19
Attending: FAMILY MEDICINE
Payer: MEDICARE

## 2017-12-19 ENCOUNTER — SPEECH THERAPY (OUTPATIENT)
Dept: SPEECH THERAPY | Facility: REHABILITATION | Age: 72
End: 2017-12-19
Attending: FAMILY MEDICINE
Payer: MEDICARE

## 2017-12-19 DIAGNOSIS — Z86.73 PERSONAL HISTORY OF TRANSIENT CEREBRAL ISCHEMIA: ICD-10-CM

## 2017-12-19 DIAGNOSIS — R41.840 COGNITIVE ATTENTION DEFICIT: ICD-10-CM

## 2017-12-19 DIAGNOSIS — R53.81 DEBILITY: ICD-10-CM

## 2017-12-19 DIAGNOSIS — Z99.3 WHEELCHAIR DEPENDENT: ICD-10-CM

## 2017-12-19 PROCEDURE — 97530 THERAPEUTIC ACTIVITIES: CPT

## 2017-12-19 PROCEDURE — 97116 GAIT TRAINING THERAPY: CPT

## 2017-12-19 PROCEDURE — 97112 NEUROMUSCULAR REEDUCATION: CPT

## 2017-12-19 PROCEDURE — 97532 HCHG COGNITIVE SKILL DEV. 15 MIN: CPT | Mod: KX

## 2017-12-19 NOTE — OP THERAPY DAILY TREATMENT
"  Outpatient Occupational Therapy   NEUROLOGICAL DAILY TREATMENT     [facility] Occupational Therapy [address]  [address]  [place] [address]  Phone:  [phone]  Fax:  [phone]    Date: 12/19/2017    Patient: Av Bob  YOB: 1945  MRN: [id number]     Time Calculation  Start time: 0200  Stop time: 0300 Time Calculation (min): 60 minutes     Chief Complaint: Extremity Weakness    Visit #: 5    Subjective \"My left shoulder is more painful than usual\"  Pain 7/10     Objective:     Strength:   Upper extremity strength (left):     Shoulder flexion: 1    Shoulder extension:  2-    Shoulder abduction: 2-    Elbow flexion: 3-    Elbow extension: 3-    Forearm pronation: 2-    Forearm supination: 2-    Wrist flexion: 2-    Wrist extension: 2-    Fingers flexion: 3    Fingers extension: 2-    Strength Comments:  Left digits with 2+ edema    Tone, Sensation and Coordination:   Tone:     Left upper extremity muscle tone: Spastic    Modified Deuce:   Upper extremity (left):     Wrist flexors: 1+    Finger flexors: 1+         Therapeutic Treatments and Modalities:    1. Therapeutic Activities (CPT 22847), 15 minutes    2. Neuromuscular Re-education (CPT 66113), 45 minutes    Therapeutic Treatments and Modalities Summary: LUE passive sustained stretch shoulder to digits within pain tolerance (sh ff to 90).  Retrograde massage to left digits secondary to 2+ edema with good results.  Min assist transfer w/c to mat stand step with platform walker, min assist supine to sit EOM.  Supine NM re-ed focused on WB through elbow/forearm for scapular strengthening focused on depression, retraction, and elevation.  A/AAROM ex elbow flex/ext, wrist flex/ext and pronation/supination in g.e plane with tactile input to facilitate an increase in muscle activation.  Gross grasp/release x 10 with assist for full digit extension.  Trace activity in shoulder ff when upper arm aligned with torso on " pillow.      Assessment, Response and Plan:   Impairments: abnormal muscle firing, abnormal muscle tone, abnormal or restricted ROM, impaired balance, impaired physical strength, lacks appropriate home exercise program, limited ADL's and limited mobility    Assessment details:  Pt making slow but steady progress with LUE strength, joint mobility, and motor control in response to NM re-ed incorporating facilitory techniques.  Pt demonstrating an increase in volitional muscle activity in all groups when performed in supine position.   Reviewed and updated HEP for stretching, strengthening, and retrograde massage with good understanding.  Prognosis: good      Plan:   Therapy options:  Occupational therapy treatment to continue  Planned therapy interventions:  E Stim Attended (CPT 78945), Manual Therapy (CPT 13643), Therapeutic Exercise (CPT 45406), Therapeutic Activities (CPT 97833), TENS Applicaton (CPT 64156), Self Care ADL Training (CPT 33906) and Neuromuscular Re-education (CPT 08812)  Frequency:  2x week  Duration in weeks:  8  Duration in visits:  15  Discussed with:  Patient

## 2017-12-20 ASSESSMENT — ACTIVITIES OF DAILY LIVING (ADL): POOR_BALANCE: 1

## 2017-12-20 NOTE — OP THERAPY DAILY TREATMENT
Outpatient Physical Therapy  DAILY TREATMENT     [facility] Physical [address]  [address]  [place] [address]  Phone:  [phone]  Fax:  [phone]    Date: 12/19/2017    Patient: Av Bob  YOB: 1945  MRN: [id number]     Time Calculation  Start time: 1500  Stop time: 1600 Time Calculation (min): 60 minutes     Chief Complaint: No chief complaint on file.    Visit #: 8    SUBJECTIVE:  Feeling stronger; UTI resolved      OBJECTIVE:  Current objective measures: Gait Training with FWW and left posterior leaf AFO @ min assist 1 x 100 ft; 1 x 60 feet; 2 x 50 feet.  Requires hand stabilization from wrap or manual assist to maintain  of FWW.  Patient requires 3 min seated rests between walking episodes.           Therapeutic Treatments and Modalities:     1. Neuromuscular Re-education (CPT 38266), 20 minutes, sit to stand with weightbearing through left upper extremity @ mod assist, standing static balance using pole and without pole for assist    2. Gait Training (CPT 87902), 35 minutes, please see above       Pain rating before treatment: 0  Pain rating after treatment: 0    ASSESSMENT:   Response to treatment: Increased stability and control left LE with AFO .  Recommend custom AFO.  Patient demonstrates posterior sway with standing balance.   Improved tolerance to functional mobility.    PLAN/RECOMMENDATIONS:   Plan for treatment: therapy treatment to continue next visit.  Planned interventions for next visit: functional training/self care (CPT 84091), gait training (CPT 88884), neuromuscular re-education (CPT 80233) and therapeutic exercise (CPT 53470). AFO consult with orthotist

## 2017-12-20 NOTE — OP THERAPY DAILY TREATMENT
"  Outpatient Speech Therapy  DAILY TREATMENT     [facility] Speech [address]  [address]  [place] [address]  Phone:  [phone]  Fax:  [phone]    Date: 12/19/2017    Patient: Av Bob  YOB: 1945  MRN: [id number]     Time Calculation  Start time: 1302  Stop time: 1400 Time Calculation (min): 58 minutes     Chief Complaint: Poor Memory and Other ('slow thinking\")    Visit #: 5    Subjective Evaluation  \"I have had busy weekends with the grand kids\"    Objective:   Treatments/Interventions Performed:  Cognitive-Linguistic training, Home program and Compensatory strategy training  Other Treatment Interventions:  Cog-linguistic 55 mins  Objective Details:  Focused on working memory/auditory processing and divided attn.  Pt able to recall and mentally manipulate 3 unit info for ranking with 73%, alphabetizing 3 words 75%.  Repetitions needed 30% of the time.  Able to complete attention processing card game with good accuracy after introduction and only min cues given.  Increased processing time.  Able to stay focused on task while clinician was talking with a resident observer.           Speech Therapy Assessment:     Cognitive Linguistic Assessment:     Patient attention divided: Moderate    Patient immediate memory: Moderate    Patient recent and short term memory: Moderate      Greatest areas of deficit are auditory processing, complex attn and memory (immediate and short term).      Speech Therapy Plan :   Prognosis: Good  Planned Therapy Interventions:  Development of Cognitive Skills, Home Program and Compensatory Strategy Training,   Frequency:  2x week              "

## 2017-12-21 ENCOUNTER — SPEECH THERAPY (OUTPATIENT)
Dept: SPEECH THERAPY | Facility: REHABILITATION | Age: 72
End: 2017-12-21
Attending: FAMILY MEDICINE
Payer: MEDICARE

## 2017-12-21 ENCOUNTER — PHYSICAL THERAPY (OUTPATIENT)
Dept: PHYSICAL THERAPY | Facility: REHABILITATION | Age: 72
End: 2017-12-21
Attending: FAMILY MEDICINE
Payer: MEDICARE

## 2017-12-21 DIAGNOSIS — Z99.3 WHEELCHAIR DEPENDENT: ICD-10-CM

## 2017-12-21 DIAGNOSIS — R41.841 COGNITIVE COMMUNICATION DEFICIT: ICD-10-CM

## 2017-12-21 PROCEDURE — G8978 MOBILITY CURRENT STATUS: HCPCS | Mod: CL

## 2017-12-21 PROCEDURE — 97112 NEUROMUSCULAR REEDUCATION: CPT | Mod: KX

## 2017-12-21 PROCEDURE — 97116 GAIT TRAINING THERAPY: CPT | Mod: KX

## 2017-12-21 PROCEDURE — 97110 THERAPEUTIC EXERCISES: CPT | Mod: KX

## 2017-12-21 PROCEDURE — 97532 HCHG COGNITIVE SKILL DEV. 15 MIN: CPT | Mod: KX

## 2017-12-21 PROCEDURE — G8979 MOBILITY GOAL STATUS: HCPCS | Mod: CJ

## 2017-12-21 NOTE — OP THERAPY DAILY TREATMENT
Outpatient Speech Therapy  DAILY TREATMENT     [facility] Speech [address]  [address]  [place] [address]  Phone:  [phone]  Fax:  [phone]    Date: 12/21/2017    Patient: Av Bob  YOB: 1945  MRN: [id number]     Time Calculation             Chief Complaint: Poor Memory and Other (slow thinking)    Visit #: 6    Subjective Evaluation  Pt notes not sleeping well last night, having a harder day today.    Objective:   Treatments/Interventions Performed:  Cognitive-Linguistic training, Compensatory strategy training and Home program  Other Treatment Interventions:  Cog-linguistic- 55 mins  Objective Details:  Pt required mod cues for use of strategies on complex sequencing task.  Pt is not double checking on own or using any organizational strategies during task completion. Pt complete attention processing game needing extra time (6:52 mins, normal ave being 1:35-1:45) with one error.  No cues needed to complete task.  Able to complete auditory divided attention task with increased time for processing and 92% accuracy.  Introduced pt to [employer] computer brain games.  Initially required mod cues to understand new task.  Complete alternating attn task with ave of 79% and increased time for processing (12 to 17 items complted).           Speech Therapy Assessment:     Cognitive Linguistic Assessment:     ST Cognitive-Linguistic Assessment L2: mod for alternating.    Patient attention divided: Minimal (min/mod auditory)    Patient immediate memory: Minimal (min/mod)    Patient recent and short term memory: Moderate    Patient ability to organize thoughts: Minimal (min/mod)        Speech Therapy Plan :   Prognosis: Good  Planned Therapy Interventions:  Development of Cognitive Skills, Compensatory Strategy Training and Home Program,   Frequency:  2x week

## 2017-12-21 NOTE — OP THERAPY DAILY TREATMENT
Outpatient Physical Therapy  DAILY TREATMENT     [facility] Physical Therapy [address]  [address]  [place] [address]  Phone:  [phone]  Fax:  [phone]    Date: 12/21/2017    Patient: Av Bob  YOB: 1945  MRN: [id number]     Time Calculation  Start time: 1500  Stop time: 1600 Time Calculation (min): 60 minutes     Chief Complaint: No chief complaint on file.    Visit #: 9    SUBJECTIVE:  Patient is out of bed greater than 50% of day  In w/c.  Walking to and from bedroom to kitchen with forearmn trough fww.      OBJECTIVE:  Current objective measures:Gait 2 x 90 feet with fww and left AFO; 2 x 80 feet without AFO @ cg-min assist for sequencing and safety.  Sit to stand and transfer w/c to mat and w/c to Nu step @ min-mod assist          Therapeutic Exercises (CPT 78478):     Total treatment time spent on Therapeutic Exercises: 10 minutes.      1. Nu step 10 min L2    Therapeutic Treatments and Modalities:     1. Gait Training (CPT 66993), 30 minutes, fww 2 x 80 ft, 2 x 90 feet @ cg -min assist    2. Neuromuscular Re-education (CPT 81356), 20 minutes, static standing balance 3 x 2 min without UE support with sitting rests x 5 min between reps, sit to stand with fww @ mod assist      Pain rating before treatment: 5  Pain rating after treatment: 5    ASSESSMENT:   Response to treatment: Patient demonstrating improved balance and is able to now ambulate with standard fww without left arm trough. Pt continues to demonstrate narrow base of support and decreased weigthshift left UE/LE but is improving. Pt continues to require min-mod assist for transfers unless he uses ceiling/floor mounted pole at home.    PLAN/RECOMMENDATIONS:   Plan for treatment: therapy treatment to continue next visit.   Planned interventions for next visit: functional training/self care (CPT 03196), gait training (CPT 95924), neuromuscular re-education (CPT 18275), therapeutic activities (CPT  25077) and therapeutic exercise (CPT 82438).

## 2017-12-22 NOTE — OP THERAPY PROGRESS SUMMARY
Outpatient Physical Therapy  PROGRESS SUMMARY NOTE      [facility] Physical Therapy [address]  [address]  [place] [address]  Phone:  [phone]  Fax:  [phone]    Date of Visit: 12/21/2017    Patient: Av Bob  YOB: 1945  MRN: [id number]     Referring Provider: Mitchell Pantoja M.D.  [address]  [address]  [place] [address]   Referring Diagnosis Personal history of transient ischemic attack (TIA), and cerebral infarction without residual deficits [Z86.73];Dependence on wheelchair [Z99.3]     Visit Diagnoses     ICD-10-CM   1. Wheelchair dependent Z99.3       Rehab Potential: Fair    Physical Therapy Occurrence Codes    Date physical therapy care plan established or reviewed:  12/11/17   Date physical therapy treatment started:  11/20/17          Cert Period: 11/20/2017to2/20/2018  Progress Report Period: 11/20/2017-12/21/2017    Functional Limitation G-Codes and Severity Modifiers     Current status:     Goal status:           Objective Findings and Assessment:   Patient progression towards goals: Patient has partially met PT short term  goals.    Patient is out of bed > 50% of day.    Patient is able to ambulate 90 feet with FWW@cg-min assist.    Patient continues to demonstrate decreased initiation and requires verbal cueing to participate.  Pt also demonstrating left neglect with gait and transfers.    Objective findings and assessment details: P+ standing balance x 2 min with no UE support    Goals:   Short Term Goals:   Patient able to walk 150 feet with assistive device @ SBA  Patient out of bed >75% of day   Independent with transfers using FWW/W/C  Short term goal time span:  2-4 weeks      Long Term Goals:    Independent with all ADLs   Ambulation 300 feet Independently with A.D.  Long term goal time span:  4-6 weeks    Plan:   Planned therapy interventions:  Gait Training (CPT 00234), Functional Training, Self Care (CPT 35652), Therapeutic  Exercise (CPT 54095), Therapeutic Activities (CPT 73233), Neuromuscular Re-education (CPT 93808) and Manual Therapy (CPT 25849)  Other planned therapy interventions:  AFO for left LE to increase stability/balance with functional mobility.  Frequency:  1x week  Duration in weeks:  10  Duration in visits:  10    Please see attached progress note from last follow up visit. Recommend continued PT to optimize function and meet goals stated on initial evaluation.  PT 1-2 x week x 5 weeks      Referring provider co-signature:  I have reviewed this plan of care and my co-signature certifies the need for services.    Physician Signature: ________________________________ Date: ______________

## 2017-12-26 ENCOUNTER — PHYSICAL THERAPY (OUTPATIENT)
Dept: PHYSICAL THERAPY | Facility: REHABILITATION | Age: 72
End: 2017-12-26
Attending: FAMILY MEDICINE
Payer: MEDICARE

## 2017-12-26 ENCOUNTER — APPOINTMENT (OUTPATIENT)
Dept: SPEECH THERAPY | Facility: REHABILITATION | Age: 72
End: 2017-12-26
Attending: FAMILY MEDICINE
Payer: MEDICARE

## 2017-12-26 DIAGNOSIS — Z99.3 WHEELCHAIR DEPENDENT: ICD-10-CM

## 2017-12-26 PROCEDURE — 97116 GAIT TRAINING THERAPY: CPT

## 2017-12-26 PROCEDURE — 97112 NEUROMUSCULAR REEDUCATION: CPT

## 2017-12-26 PROCEDURE — 97110 THERAPEUTIC EXERCISES: CPT

## 2017-12-27 ENCOUNTER — OFFICE VISIT (OUTPATIENT)
Dept: CARDIOLOGY | Facility: MEDICAL CENTER | Age: 72
End: 2017-12-27
Payer: MEDICARE

## 2017-12-27 VITALS
WEIGHT: 195 LBS | OXYGEN SATURATION: 96 % | HEIGHT: 72 IN | BODY MASS INDEX: 26.41 KG/M2 | DIASTOLIC BLOOD PRESSURE: 64 MMHG | HEART RATE: 68 BPM | SYSTOLIC BLOOD PRESSURE: 102 MMHG

## 2017-12-27 DIAGNOSIS — I25.2 HISTORY OF MYOCARDIAL INFARCTION: ICD-10-CM

## 2017-12-27 DIAGNOSIS — Z98.61 S/P PTCA (PERCUTANEOUS TRANSLUMINAL CORONARY ANGIOPLASTY): ICD-10-CM

## 2017-12-27 DIAGNOSIS — I25.10 ATHEROSCLEROSIS OF NATIVE CORONARY ARTERY OF NATIVE HEART WITHOUT ANGINA PECTORIS: ICD-10-CM

## 2017-12-27 DIAGNOSIS — R07.89 CHEST TIGHTNESS: ICD-10-CM

## 2017-12-27 DIAGNOSIS — Z95.5 PRESENCE OF DRUG COATED STENT IN LEFT CIRCUMFLEX CORONARY ARTERY: ICD-10-CM

## 2017-12-27 DIAGNOSIS — Z01.810 PREOP CARDIOVASCULAR EXAM: ICD-10-CM

## 2017-12-27 DIAGNOSIS — I10 ESSENTIAL HYPERTENSION, BENIGN: ICD-10-CM

## 2017-12-27 DIAGNOSIS — G81.94 LEFT HEMIPARESIS (HCC): ICD-10-CM

## 2017-12-27 DIAGNOSIS — Z95.5 PRESENCE OF DRUG COATED STENT IN LAD CORONARY ARTERY: ICD-10-CM

## 2017-12-27 LAB — EKG IMPRESSION: NORMAL

## 2017-12-27 PROCEDURE — 99214 OFFICE O/P EST MOD 30 MIN: CPT | Performed by: INTERNAL MEDICINE

## 2017-12-27 PROCEDURE — 93000 ELECTROCARDIOGRAM COMPLETE: CPT | Performed by: INTERNAL MEDICINE

## 2017-12-27 RX ORDER — METFORMIN HYDROCHLORIDE 500 MG/1
TABLET, EXTENDED RELEASE ORAL
Refills: 0 | COMMUNITY
Start: 2017-11-27 | End: 2017-12-27

## 2017-12-27 ASSESSMENT — LIFESTYLE VARIABLES: SUBSTANCE_ABUSE: 0

## 2017-12-27 ASSESSMENT — ENCOUNTER SYMPTOMS
ORTHOPNEA: 0
DIAPHORESIS: 0
BRUISES/BLEEDS EASILY: 0
SHORTNESS OF BREATH: 1
DIARRHEA: 1
FEVER: 0
PND: 0
POLYDIPSIA: 0
CHILLS: 0
COUGH: 0
HEMOPTYSIS: 0
WHEEZING: 0
BLOOD IN STOOL: 0
FOCAL WEAKNESS: 1

## 2017-12-27 NOTE — OP THERAPY DAILY TREATMENT
Outpatient Physical Therapy  DAILY TREATMENT     [facility] Physical [address].  [address]  [place] [address]  Phone:  [phone]  Fax:  [phone]    Date: 12/26/2017    Patient: Av Bob  YOB: 1945  MRN: [id number]     Time Calculation  Start time: 1505  Stop time: 1600 Time Calculation (min): 55 minutes     Chief Complaint: No chief complaint on file.    Visit #: 10    SUBJECTIVE: Patient feeling very depressed and having a bad day.  Tearful upon arrival      OBJECTIVE:  Current objective measures: Transfer w/c to nu step @ min assist stand pivot,          Therapeutic Exercises (CPT 69476):     Total treatment time spent on Therapeutic Exercises: 15 minutes.      1. Nu step 15 min L2    Therapeutic Treatments and Modalities:     1. Gait Training (CPT 49715), 25 minutes, fww 2 x 70 ft, 2 x 50 feet @ cg -min assist    2. Neuromuscular Re-education (CPT 37752), 15 minutes, static standing balance 3 x 2 min without UE support with sitting rests x 5 min between reps, sit to stand with fww @ mod assist      Pain rating before treatment: 0  Pain rating after treatment: 0    ASSESSMENT:   Response to treatment: Patient demonstrating improved standing balance (2 1/2 min) @ SBA without UE support and improved reciprocal gait with left AFO and fww. Continues to require cueing for hand placement during transfers but demonstrating improved initiation with gait.  Patient appeared less depressed post session.    PLAN/RECOMMENDATIONS:   Plan for treatment: therapy treatment to continue next visit.  Planned interventions for next visit: functional training/self care (CPT 77823), gait training (CPT 17775), neuromuscular re-education (CPT 24346), therapeutic activities (CPT 49656) and therapeutic exercise (CPT 34115).

## 2017-12-27 NOTE — LETTER
[facility]   [address]   [address]  [place] [address]  Phone: [phone]  Fax: [phone]              Av Bob  1945    Encounter Date: 12/27/2017    Larry Patterson M.D.          PROGRESS NOTE:  Subjective:   Av Bob is a 72 y.o. male who presents today For follow-up of his coronary disease. He has not had any cardiac symptoms. He has continued to work and physical therapy. He's had no angina there and no effort dyspnea although his wife does note that he gets dyspneic sometimes with home activity. He's had no arrhythmias. He is going to require later laser lithotripsy to clear his potentially infected renal stone.  He has not had any bleeding complications of dual antiplatelet therapy and his stents were in 2009 and the etiology of his possible stroke was never established.  That evolved into the indication for continuing dual antiplatelet therapy beyond its usual duration for his coronary stents. All of this was followed at [facility] in [facility].    Past Medical History:   Diagnosis Date   • Acute respiratory failure with hypoxia (CMS-HCC) 5/20/2017   • Cerebrovascular accident (CVA) (CMS-HCC) 12/30/2016    Left arm weakness  etiology of stroke not established, lymphoma discovered on MRI evaluation of stroke, L hemiparesis much worse after acute infectious illness in mid 2017, but no specific diagnosed recurrent neurological etiology, all at [facility], [facility]   • CKD (chronic kidney disease) stage 3, GFR 30-59 ml/min    • Controlled gout 2014   • Coronary atherosclerosis of native coronary artery     S/P PTCA (percutaneous transluminal coronary angioplasty), RCA, 5/1997, patent on cath 7/10/2009 at the time of interventions on his left anterior descending and circumflex coronary arteries   • Enterococcal septicemia (CMS-HCC) 8/12/2017   •  Hypertension    • Hypokalemia 2012    controlled with combination of ACE inhibitor or ARB plus spironolactone   • Hypomagnesemia 08/12/2017    etiology uncertain   • 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 Barry   • NSTEMI (non-ST elevated myocardial infarction) (CMS-HCC) 07/18/2017    complicating UTI with sepsis   • Polyneuropathy in diabetes(357.2) 9/11/2013   • Presence of BMS and drug coated stents in LAD coronary artery     GFX 4.0 mm May 1999,Overlapping [place] 3.0 stents, 7/10/2009, Dr. Trever Levine [facility]    • Presence of drug coated stents in left circumflex coronary artery     Overlapping 2.5mm [place] and [place] stents in OM, 7/10/2009, Dr. Trever Levine [facility]    • Septic shock (CMS-HCC) 5/20/2017   • Skin ulcer of calf (CMS-HCC) 2015    Right, Dr. Terry and wound care   • Type II or unspecified type diabetes mellitus with neurological manifestations, uncontrolled(250.62) 9/11/2013   • Wound of left leg 2012    Requiring surgery and debridment, Dr. Moore     Past Surgical History:   Procedure Laterality Date   • WOUND CLOSURE GENERAL  4/3/2012    Performed by TREVER MOORE at SURGERY SAME DAY [facility]   • ANGIOPLASTY  1997    RCA followed by other stents as noted above.    • CATARACT EXTRACTION WITH IOL      bilateral   • LITHOTRIPSY     • TONSILLECTOMY AND ADENOIDECTOMY       Family History   Problem Relation Age of Onset   • Heart Disease Father      CAD   • Diabetes Father    • Cancer Mother    • Psychiatry Mother      Depression   • Kidney stones Brother    • Heart Disease Brother    • Psychiatry Brother      Depression     History   Smoking Status   • Never Smoker   Smokeless Tobacco   • Never Used     Allergies   Allergen Reactions   • Diphenhydramine Hcl Anxiety     Pt is able to tolerate  Mg benadryl with less anxiety   • Lorazepam Unspecified      Disorientation   • Ciprofloxacin      Rash,stomach ache   • Nkda [No Known Drug Allergy]      Outpatient Encounter Prescriptions as of 12/27/2017   Medication Sig Dispense Refill   • fluoxetine (PROZAC) 20 MG Cap Take 2 Caps by mouth every day. 90 Cap 0   • magnesium oxide (MAG-OX) 400 MG Tab Take 400 mg by mouth 3 times a day.     • vitamin D (CHOLECALCIFEROL) 1000 UNIT Tab Take 2,000 Units by mouth every day.     • saccharomyces boulardii (FLORASTOR) 250 MG Cap Take 250 mg by mouth 2 Times a Day.     • metoprolol (TOPROL-XL) 200 MG XL tablet Take 1 Tab by mouth every day. 90 Tab 3   • Melatonin 5 MG Tab Take 1-2 Tabs by mouth at bedtime as needed.     • fenofibrate (TRICOR) 145 MG Tab Take 1 Tab by mouth every day. 90 Tab 3   • tramadol (ULTRAM) 50 MG Tab Take 50 mg by mouth every four hours as needed.     • losartan (COZAAR) 50 MG Tab Take 1 Tab by mouth every day. 30 Tab 6   • spironolactone (ALDACTONE) 50 MG Tab Take 1 Tab by mouth every day. 90 Tab 3   • Insulin Glargine (TOUJEO SOLOSTAR SC) Inject 15 Units as instructed every evening. use 15 units nightly according to endocrenologist instruction on 3/23/17     • loperamide (IMODIUM A-D) 2 MG Cap Take 2 mg by mouth 4 times a day as needed for Diarrhea.     • aspirin 81 MG tablet Take 81 mg by mouth every day.     • Acetaminophen 325 MG Cap Take 650 mg by mouth 4 times a day as needed (Pain).     • insulin lispro, Human, (HUMALOG) 100 UNIT/ML Solution Pen-injector injection Using up to 50 units per day as directed. 45 mL 2   • nystatin (MYCOSTATIN) Powder Apply 1 Dose to affected area(s) 3 times a day as needed. groin rash     • atorvastatin (LIPITOR) 40 MG Tab Take 0.5 Tabs by mouth every day. 90 Tab 3   • clopidogrel (PLAVIX) 75 MG Tab Take 1 Tab by mouth every day. 90 Tab 3   • allopurinol (ZYLOPRIM) 300 MG Tab Take 1 Tab by mouth every day. 90 Tab 3   • nitroglycerin (NITROSTAT) 0.4 MG SL Tab Place 1 Tab under tongue as needed for Chest Pain. 25 Tab 6   •  B-D UF III MINI PEN NEEDLES 31G X 5 MM Misc USE AS DIRECTED WITH INSULIN INJECTIONS 450 Each 2   • CENTRUM SILVER PO Take 1 Tab by mouth every day.     • [DISCONTINUED] metformin ER (GLUCOPHAGE XR) 500 MG TABLET SR 24 HR TK 1 T PO  BID  0   • [DISCONTINUED] MAGNESIUM-OXIDE 400 (241.3 Mg) MG Tab tablet TK 2 TS PO TID  6   • ascorbic acid (VITAMIN C) 500 MG/5ML syrup Take 500 mg by mouth every day.       No facility-administered encounter medications on file as of 12/27/2017.      Review of Systems   Constitutional: Positive for malaise/fatigue. Negative for chills, diaphoresis and fever.   HENT: Negative for nosebleeds.    Respiratory: Positive for shortness of breath (his wife notes that he is dyspneic at home sometimes with activity although not at PT). Negative for cough, hemoptysis and wheezing.    Cardiovascular: Negative for orthopnea and PND.   Gastrointestinal: Positive for diarrhea (better with lower magnesium dose and magnesium is now low normal). Negative for blood in stool and melena.   Genitourinary: Negative for hematuria.   Neurological: Positive for focal weakness (left-sided and very slowly improving).        Left hemiparalysis is very slowly responding to physical therapy.   Endo/Heme/Allergies: Negative for polydipsia. Does not bruise/bleed easily.   Psychiatric/Behavioral: Negative for substance abuse.        Objective:   /64   Pulse 68   Ht 1.829 m (6')   Wt 88.5 kg (195 lb)   SpO2 96%   BMI 26.45 kg/m²      Physical Exam   Constitutional: He is oriented to person, place, and time. He appears well-developed and well-nourished. No distress.   Gen. appearance continues to improve but he still requires a wheelchair   HENT:   Mouth/Throat: Oropharynx is clear and moist.   Eyes: Conjunctivae are normal. No scleral icterus.   Neck: No JVD present.   Cardiovascular: Normal rate, regular rhythm, normal heart sounds and intact distal pulses.  Exam reveals no gallop.    No murmur  heard.  Pulmonary/Chest: Effort normal and breath sounds normal.   Musculoskeletal: He exhibits no edema.   Neurological: He is alert and oriented to person, place, and time.   Skin: Skin is warm and dry. He is not diaphoretic.   Psychiatric: He has a normal mood and affect. Thought content normal.     His EKG shows a shift in QRS axis probably due to electrode placement with EKG done with thoracic electrode placement due to his sitting position but there are no ST-T changes or rhythm changes or conduction defect.  Arm lead positioning was verified.  Assessment:     1. Preop cardiovascular exam     2. Atherosclerosis of native coronary artery of native heart without angina pectoris  EKG    NM-CARDIAC STRESS TEST   3. Presence of drug coated stent in LAD coronary artery     4. Presence of drug coated stent in left circumflex coronary artery     5. S/P PTCA (percutaneous transluminal coronary angioplasty), RCA, 5/1997, patent 7/10/2009     6. Essential hypertension, benign     7. Chest tightness     8. History of myocardial infarction     9. Left hemiparesis (CMS-[facility])       I was able to get his nuclear medicine scan report. It demonstrated significantly conflicting data. A small fixed apical defect of moderate intensity and a mild reversible inferior small defect were described but the sum difference score was reported as 6. The same study described an ejection fraction of 31% but an echo done the same day, 6/20/2017, described an ejection fraction of 62% and the echo here confirmed the latter with an ejection fraction of 65% therefore the perfusion data are suspect as well and we need to verify whether or not he has a significant perfusion defect but this should not stop laser lithotripsy which is essential for a variety of reasons both symptoms of nephrolithiasis but also the risk of recurrent infection which was nearly fatal last year.  His cardiac status is asymptomatic.  The other issue is his neurological  status.  His imaging data are also listed under care everywhere at [facility].    Recall that he had a possible stroke which then evolved into the diagnosis of cerebral lymphoma but his physicians at [facility] were never satisfied that he hadn't had a stroke in addition. He was able to walk well after that but did note left arm symptoms during the acute phase of that. He had almost complete recovery. The imaging data were conflicting. He then developed a noro virus infection following a urinary tract infection and sepsis and has never been able to walk well again after a prolonged period of time in the ICU in mid 2017 at [facility] but no definite recurrent neurological event was identified. His symptoms have always been left lateral and therefore not suggestive of transverse myelitis complicating his acute infection.    Medical Decision Making:  Today's Assessment / Status / Plan:     We need to get a follow-up myocardial perfusion scan but he definitely needs the laser lithotripsy and I would not disqualify him from that based on the data we have. If possible I would like to get the myocardial perfusion scan beforehand. I made no change in his medical regimen today.  He can suspend his aspirin for a week and his Plavix for 5 days prior to the anticipated urological procedure and the Plavix even 7 days if that is required. He should be resuming it when possible after surgery. I would anticipate ultimately repeating his carotid ultrasound here and making a decision regarding long-term antiplatelet therapy based on that and possibly Plavix alone would be the choice. Cardiology follow-up will be in 3 months. He will be seeing Dr. Pantoja,   and Dr. Maguire in the interval as well as Dr. Barry.  He is not planning to go back to [place] for further therapy and oncology follow-up has been established here with Dr. Noel..  That problem is apparently very stable.    His wife is very well versed in  the use the emergency medical system for any unstable symptoms.      Rey Barry M.D.  [address]  [address]  [place] [address]  VIA Facsimile: [phone]     Trinidad Maguire M.D.  [address]  [address]  [place] [address]  VIA In Basket     Alfonso Wilde M.D.  [address]  [address]  [place] [address]  VIA Facsimile: [phone]     Mitchell Pantoja M.D.  [address]  [address]  [place] [address]  VIA In Basket     Laly Jarvis M.D.  [address]  [address]  [place] [address]  VIA In Basket     Ellie Noel M.D.  [address]  [place] [address]  VIA Facsimile: [phone]

## 2017-12-28 ENCOUNTER — PHYSICAL THERAPY (OUTPATIENT)
Dept: PHYSICAL THERAPY | Facility: REHABILITATION | Age: 72
End: 2017-12-28
Attending: FAMILY MEDICINE
Payer: MEDICARE

## 2017-12-28 ENCOUNTER — SPEECH THERAPY (OUTPATIENT)
Dept: SPEECH THERAPY | Facility: REHABILITATION | Age: 72
End: 2017-12-28
Attending: FAMILY MEDICINE
Payer: MEDICARE

## 2017-12-28 DIAGNOSIS — R13.12 OROPHARYNGEAL DYSPHAGIA: ICD-10-CM

## 2017-12-28 DIAGNOSIS — Z99.3 WHEELCHAIR DEPENDENT: ICD-10-CM

## 2017-12-28 DIAGNOSIS — R41.840 COGNITIVE ATTENTION DEFICIT: ICD-10-CM

## 2017-12-28 PROCEDURE — 97532 HCHG COGNITIVE SKILL DEV. 15 MIN: CPT | Mod: KX

## 2017-12-28 PROCEDURE — 97116 GAIT TRAINING THERAPY: CPT | Mod: KX

## 2017-12-28 PROCEDURE — 97110 THERAPEUTIC EXERCISES: CPT | Mod: KX

## 2017-12-28 PROCEDURE — 97112 NEUROMUSCULAR REEDUCATION: CPT | Mod: KX

## 2017-12-28 NOTE — OP THERAPY DAILY TREATMENT
Outpatient Physical Therapy  DAILY TREATMENT     [facility] [address]  [address]  [place] [address]  Phone:  [phone]  Fax:  [phone]    Date: 12/28/2017    Patient: Av Bob  YOB: 1945  MRN: [id number]     Time Calculation  Start time: 1400  Stop time: 1500 Time Calculation (min): 60 minutes     Chief Complaint: No chief complaint on file.    Visit #: 11    SUBJECTIVE: Fell back into wheelchair after losing balance at home using fww.  Left UE pain increased today      OBJECTIVE:  Current objective measures: Poor + static standing balance x 1 min.  Increased posterior sway. Sit to stand@ mod assist w/c to fww  Gait 1 x 60 feet, 1 x 30 feet and 1 x 20 feet with fww and left AFO@min assist secondary to decreased stability, narrow base of support and left UE assist.           Therapeutic Exercises (CPT 67216):     Total treatment time spent on Therapeutic Exercises: 20 minutes.      1. Sit to stand w/c to fww mod assist, 1 x 10    2. Nu step L2, 15 min    Therapeutic Treatments and Modalities:     1. Neuromuscular Re-education (CPT 16448), 15 minutes, standing static and dynamic balance with reaching tasks using bilateral UE@ min assist.  static standing 3 x 1 min with no UE support    2. Gait Training (CPT 19568), 25 min minutes, with fww and left AFO,       Pain rating before treatment: 3  Pain rating after treatment: 3    ASSESSMENT:   Response to treatment: Poor tolerance to gait training and standing balance training. Pt requiring verbal cueing for sequencing and initiation.   Increased loss of balance requiring min assist to recover.   Patient given trial AFO to try at home during gait.  Poor compliance to home program secondary to depressed.    PLAN/RECOMMENDATIONS:   Plan for treatment: therapy treatment to continue next visit.  Planned interventions for next visit: functional training/self care (CPT 79646), gait training (CPT 65583),  neuromuscular re-education (CPT 52881), therapeutic activities (CPT 92941) and therapeutic exercise (CPT 02240).

## 2017-12-28 NOTE — PROGRESS NOTES
Subjective:   Av Bob is a 72 y.o. male who presents today For follow-up of his coronary disease. He has not had any cardiac symptoms. He has continued to work and physical therapy. He's had no angina there and no effort dyspnea although his wife does note that he gets dyspneic sometimes with home activity. He's had no arrhythmias. He is going to require later laser lithotripsy to clear his potentially infected renal stone.  He has not had any bleeding complications of dual antiplatelet therapy and his stents were in 2009 and the etiology of his possible stroke was never established.  That evolved into the indication for continuing dual antiplatelet therapy beyond its usual duration for his coronary stents. All of this was followed at [facility] in [facility].    Past Medical History:   Diagnosis Date   • Acute respiratory failure with hypoxia (CMS-HCC) 5/20/2017   • Cerebrovascular accident (CVA) (CMS-HCC) 12/30/2016    Left arm weakness  etiology of stroke not established, lymphoma discovered on MRI evaluation of stroke, L hemiparesis much worse after acute infectious illness in mid 2017, but no specific diagnosed recurrent neurological etiology, all at [facility], 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 Trever Levine [place]    • Presence of drug coated stents in left circumflex coronary artery     Overlapping 2.5mm Taxus and Spencer stents in OM, 7/10/2009, Dr. Trever Levine [facility]    • Septic shock (CMS-HCC) 5/20/2017   • Skin ulcer of calf (CMS-HCC) 2015    Right, Dr. Terry and wound care   • Type II or unspecified type diabetes mellitus with neurological manifestations, uncontrolled(250.62) 9/11/2013   • Wound of left leg 2012    Requiring surgery and debridment, Dr. Moore     Past Surgical History:   Procedure Laterality Date   • WOUND CLOSURE GENERAL  4/3/2012    Performed by TREVER MOORE at SURGERY SAME DAY [facility] ORS   • ANGIOPLASTY  1997    RCA followed by other stents as noted above.    • CATARACT EXTRACTION WITH IOL      bilateral   • LITHOTRIPSY     • TONSILLECTOMY AND ADENOIDECTOMY       Family History   Problem Relation Age of Onset   • Heart Disease Father      CAD   • Diabetes Father    • Cancer Mother    • Psychiatry Mother      Depression   • Kidney stones Brother    • Heart Disease Brother    • Psychiatry Brother      Depression     History   Smoking Status   • Never Smoker   Smokeless Tobacco   • Never Used     Allergies   Allergen Reactions   • Diphenhydramine Hcl Anxiety     Pt is able to tolerate  Mg benadryl with less anxiety   • Lorazepam Unspecified     Disorientation   • Ciprofloxacin      Rash,stomach ache   • Nkda [No Known Drug Allergy]      Outpatient Encounter Prescriptions as of 12/27/2017   Medication Sig Dispense Refill   • fluoxetine (PROZAC) 20 MG Cap Take 2 Caps by mouth every day. 90 Cap 0   • magnesium oxide (MAG-OX) 400 MG Tab Take 400  mg by mouth 3 times a day.     • vitamin D (CHOLECALCIFEROL) 1000 UNIT Tab Take 2,000 Units by mouth every day.     • saccharomyces boulardii (FLORASTOR) 250 MG Cap Take 250 mg by mouth 2 Times a Day.     • metoprolol (TOPROL-XL) 200 MG XL tablet Take 1 Tab by mouth every day. 90 Tab 3   • Melatonin 5 MG Tab Take 1-2 Tabs by mouth at bedtime as needed.     • fenofibrate (TRICOR) 145 MG Tab Take 1 Tab by mouth every day. 90 Tab 3   • tramadol (ULTRAM) 50 MG Tab Take 50 mg by mouth every four hours as needed.     • losartan (COZAAR) 50 MG Tab Take 1 Tab by mouth every day. 30 Tab 6   • spironolactone (ALDACTONE) 50 MG Tab Take 1 Tab by mouth every day. 90 Tab 3   • Insulin Glargine (TOUJEO SOLOSTAR SC) Inject 15 Units as instructed every evening. use 15 units nightly according to endocrenologist instruction on 3/23/17     • loperamide (IMODIUM A-D) 2 MG Cap Take 2 mg by mouth 4 times a day as needed for Diarrhea.     • aspirin 81 MG tablet Take 81 mg by mouth every day.     • Acetaminophen 325 MG Cap Take 650 mg by mouth 4 times a day as needed (Pain).     • insulin lispro, Human, (HUMALOG) 100 UNIT/ML Solution Pen-injector injection Using up to 50 units per day as directed. 45 mL 2   • nystatin (MYCOSTATIN) Powder Apply 1 Dose to affected area(s) 3 times a day as needed. groin rash     • atorvastatin (LIPITOR) 40 MG Tab Take 0.5 Tabs by mouth every day. 90 Tab 3   • clopidogrel (PLAVIX) 75 MG Tab Take 1 Tab by mouth every day. 90 Tab 3   • allopurinol (ZYLOPRIM) 300 MG Tab Take 1 Tab by mouth every day. 90 Tab 3   • nitroglycerin (NITROSTAT) 0.4 MG SL Tab Place 1 Tab under tongue as needed for Chest Pain. 25 Tab 6   • B-D UF III MINI PEN NEEDLES 31G X 5 MM Misc USE AS DIRECTED WITH INSULIN INJECTIONS 450 Each 2   • CENTRUM SILVER PO Take 1 Tab by mouth every day.     • [DISCONTINUED] metformin ER (GLUCOPHAGE XR) 500 MG TABLET SR 24 HR TK 1 T PO  BID  0   • [DISCONTINUED] MAGNESIUM-OXIDE 400 (241.3 Mg) MG Tab tablet  TK 2 TS PO TID  6   • ascorbic acid (VITAMIN C) 500 MG/5ML syrup Take 500 mg by mouth every day.       No facility-administered encounter medications on file as of 12/27/2017.      Review of Systems   Constitutional: Positive for malaise/fatigue. Negative for chills, diaphoresis and fever.   HENT: Negative for nosebleeds.    Respiratory: Positive for shortness of breath (his wife notes that he is dyspneic at home sometimes with activity although not at PT). Negative for cough, hemoptysis and wheezing.    Cardiovascular: Negative for orthopnea and PND.   Gastrointestinal: Positive for diarrhea (better with lower magnesium dose and magnesium is now low normal). Negative for blood in stool and melena.   Genitourinary: Negative for hematuria.   Neurological: Positive for focal weakness (left-sided and very slowly improving).        Left hemiparalysis is very slowly responding to physical therapy.   Endo/Heme/Allergies: Negative for polydipsia. Does not bruise/bleed easily.   Psychiatric/Behavioral: Negative for substance abuse.        Objective:   /64   Pulse 68   Ht 1.829 m (6')   Wt 88.5 kg (195 lb)   SpO2 96%   BMI 26.45 kg/m²     Physical Exam   Constitutional: He is oriented to person, place, and time. He appears well-developed and well-nourished. No distress.   Gen. appearance continues to improve but he still requires a wheelchair   HENT:   Mouth/Throat: Oropharynx is clear and moist.   Eyes: Conjunctivae are normal. No scleral icterus.   Neck: No JVD present.   Cardiovascular: Normal rate, regular rhythm, normal heart sounds and intact distal pulses.  Exam reveals no gallop.    No murmur heard.  Pulmonary/Chest: Effort normal and breath sounds normal.   Musculoskeletal: He exhibits no edema.   Neurological: He is alert and oriented to person, place, and time.   Skin: Skin is warm and dry. He is not diaphoretic.   Psychiatric: He has a normal mood and affect. Thought content normal.     His EKG shows a  shift in QRS axis probably due to electrode placement with EKG done with thoracic electrode placement due to his sitting position but there are no ST-T changes or rhythm changes or conduction defect.  Arm lead positioning was verified.  Assessment:     1. Preop cardiovascular exam     2. Atherosclerosis of native coronary artery of native heart without angina pectoris  EKG    NM-CARDIAC STRESS TEST   3. Presence of drug coated stent in LAD coronary artery     4. Presence of drug coated stent in left circumflex coronary artery     5. S/P PTCA (percutaneous transluminal coronary angioplasty), RCA, 5/1997, patent 7/10/2009     6. Essential hypertension, benign     7. Chest tightness     8. History of myocardial infarction     9. Left hemiparesis (CMS-[facility])       I was able to get his nuclear medicine scan report. It demonstrated significantly conflicting data. A small fixed apical defect of moderate intensity and a mild reversible inferior small defect were described but the sum difference score was reported as 6. The same study described an ejection fraction of 31% but an echo done the same day, 6/20/2017, described an ejection fraction of 62% and the echo here confirmed the latter with an ejection fraction of 65% therefore the perfusion data are suspect as well and we need to verify whether or not he has a significant perfusion defect but this should not stop laser lithotripsy which is essential for a variety of reasons both symptoms of nephrolithiasis but also the risk of recurrent infection which was nearly fatal last year.  His cardiac status is asymptomatic.  The other issue is his neurological status.  His imaging data are also listed under care everywhere at [facility].    Recall that he had a possible stroke which then evolved into the diagnosis of cerebral lymphoma but his physicians at [facility] were never satisfied that he hadn't had a stroke in addition. He was able to walk well after that but did  note left arm symptoms during the acute phase of that. He had almost complete recovery. The imaging data were conflicting. He then developed a noro virus infection following a urinary tract infection and sepsis and has never been able to walk well again after a prolonged period of time in the ICU in mid 2017 at [facility] but no definite recurrent neurological event was identified. His symptoms have always been left lateral and therefore not suggestive of transverse myelitis complicating his acute infection.    Medical Decision Making:  Today's Assessment / Status / Plan:     We need to get a follow-up myocardial perfusion scan but he definitely needs the laser lithotripsy and I would not disqualify him from that based on the data we have. If possible I would like to get the myocardial perfusion scan beforehand. I made no change in his medical regimen today.  He can suspend his aspirin for a week and his Plavix for 5 days prior to the anticipated urological procedure and the Plavix even 7 days if that is required. He should be resuming it when possible after surgery. I would anticipate ultimately repeating his carotid ultrasound here and making a decision regarding long-term antiplatelet therapy based on that and possibly Plavix alone would be the choice. Cardiology follow-up will be in 3 months. He will be seeing Dr. Pantoja,   and Dr. Maguire in the interval as well as Dr. Barry.  He is not planning to go back to [place] for further therapy and oncology follow-up has been established here with Dr. Noel..  That problem is apparently very stable.    His wife is very well versed in the use the emergency medical system for any unstable symptoms.

## 2017-12-28 NOTE — OP THERAPY DAILY TREATMENT
Outpatient Speech Therapy  DAILY TREATMENT     [facility] Speech [address].  [address]  [place] [address]  Phone:  [phone]  Fax:  [phone]    Date: 12/28/2017    Patient: Av Bob  YOB: 1945  MRN: [id number]     Time Calculation  Start time: 1301  Stop time: 1355 Time Calculation (min): 54 minutes     Chief Complaint: Poor Memory; Other (slow processing); and Choking (on liquids)    Visit #: 7    Subjective Evaluation  Pt in good spirits.  Wife joined in for a bit to discussed increased swallow deficits lately.  Pt has been choking on water more frequently in past few weeks.  Dysphagia had resolved until recently.     Objective:   Treatments/Interventions Performed:  Cognitive-Linguistic training, Dysphagia treatment, Patient/Caregiver education and Compensatory strategy training  Other Treatment Interventions:  Cog-ling- 53 mins  Objective Details:  Screened swallow function with liquids.  Pt demonstrated coughing during sequential swallows of thin liquids.  Much better with single sip and double swallow.  Timing of swallow to palpation appears a bit slow.  Decreased lip closure with swallows.  Written swallow strategies given to pt of small sips, double swallow after each sip, slow rate of intake, check voice and clear if wet.  May need to consider completing MBSS to fully assess swallow function. Signed pt up for his own [employer] account.  Completed selective attention (flexibiliy of thinking) task with 66% and mod cues.  Max cues for more abstract divided attn task.  Difficulty understanding new instructions and difficulty retaining info, needs to re-read instructions every trial.          Speech Therapy Assessment:     Cognitive Linguistic Assessment:     Patient attention selective: Moderate    Patient attention divided: Moderate    Patient immediate memory: Moderate    Patient recent and short term memory: Moderate    Patient remote and long  term memory: Minimal (notes forgetting a favorite band)      Pharyngeal Phase Assessment:     Pharyngeal phase comments: Suspect delayed initiation of swallow and possible pharyngeal residue.  Strategies given will cont to follow for swallow function and add goals as needed.       Speech Therapy Plan :   Prognosis: Fair  Compensatory swallow strategies:  Upright position 90 degrees during meal and 45 minutes after meal, Reduce bolus size, Multiple swallows, Cough/clear wet vocal quality after swallow and No talking while eating (single sip and double swallow)  Diet Recommendation:  Normal Consistency  Liquid Recommendation:  Thin Liquid  Planned Therapy Interventions:  Compensatory Strategy Training, Dysphagia treatment and Cognitive-Linguistic training,   Frequency:  2x week

## 2017-12-28 NOTE — OP THERAPY DAILY TREATMENT
"  Outpatient Physical Therapy  DAILY TREATMENT     [facility] Physical [address].  [address]  [place] [address]  Phone:  [phone]  Fax:  [phone]    Date: 12/28/2017    Patient: Av Bob  YOB: 1945  MRN: [id number]     Time Calculation  Start time: 1400  Stop time: 1500 Time Calculation (min): 60 minutes     Chief Complaint: No chief complaint on file.    Visit #: 11    SUBJECTIVE: Fell back into wheelchair after losing balance at home using fww.  Left UE pain increased today      OBJECTIVE:  Current objective measures:       Exercises/Treatment    Pain rating before treatment: 3  Pain rating after treatment: 3    ASSESSMENT:   Response to treatment: ***    PLAN/RECOMMENDATIONS:   Plan for treatment: {AMB OP THERAPY - THERAPY PLAN:382871722::\"therapy treatment to continue next visit\"}.  Planned interventions for next visit: {PT PLANNED THERAPY INTERVENTIONS:713841629}.      "

## 2017-12-29 ENCOUNTER — HOSPITAL ENCOUNTER (OUTPATIENT)
Dept: LAB | Facility: MEDICAL CENTER | Age: 72
End: 2017-12-29
Attending: ANESTHESIOLOGY
Payer: MEDICARE

## 2017-12-29 ENCOUNTER — OFFICE VISIT (OUTPATIENT)
Dept: BEHAVIORAL HEALTH | Facility: PHYSICIAN GROUP | Age: 72
End: 2017-12-29
Payer: MEDICARE

## 2017-12-29 ENCOUNTER — TELEPHONE (OUTPATIENT)
Dept: NEPHROLOGY | Facility: MEDICAL CENTER | Age: 72
End: 2017-12-29

## 2017-12-29 DIAGNOSIS — F43.21 ADJUSTMENT DISORDER WITH DEPRESSED MOOD: ICD-10-CM

## 2017-12-29 LAB
APPEARANCE UR: ABNORMAL
APTT PPP: 25 SEC (ref 24.7–36)
BACTERIA #/AREA URNS HPF: NEGATIVE /HPF
BILIRUB UR QL STRIP.AUTO: NEGATIVE
COLOR UR: YELLOW
EPI CELLS #/AREA URNS HPF: NEGATIVE /HPF
GLUCOSE UR STRIP.AUTO-MCNC: 100 MG/DL
HYALINE CASTS #/AREA URNS LPF: ABNORMAL /LPF
INR PPP: 1.08 (ref 0.87–1.13)
KETONES UR STRIP.AUTO-MCNC: NEGATIVE MG/DL
LEUKOCYTE ESTERASE UR QL STRIP.AUTO: ABNORMAL
MICRO URNS: ABNORMAL
NITRITE UR QL STRIP.AUTO: NEGATIVE
PH UR STRIP.AUTO: 6 [PH]
PROT UR QL STRIP: 30 MG/DL
PROTHROMBIN TIME: 13.7 SEC (ref 12–14.6)
RBC # URNS HPF: ABNORMAL /HPF
RBC UR QL AUTO: ABNORMAL
SP GR UR STRIP.AUTO: 1.01
UROBILINOGEN UR STRIP.AUTO-MCNC: 0.2 MG/DL
WBC #/AREA URNS HPF: ABNORMAL /HPF

## 2017-12-29 PROCEDURE — 81001 URINALYSIS AUTO W/SCOPE: CPT

## 2017-12-29 PROCEDURE — 90834 PSYTX W PT 45 MINUTES: CPT | Performed by: PSYCHOLOGIST

## 2017-12-29 PROCEDURE — 36415 COLL VENOUS BLD VENIPUNCTURE: CPT | Mod: GA

## 2017-12-29 PROCEDURE — 85610 PROTHROMBIN TIME: CPT | Mod: GA

## 2017-12-29 PROCEDURE — 87086 URINE CULTURE/COLONY COUNT: CPT

## 2017-12-29 PROCEDURE — 85730 THROMBOPLASTIN TIME PARTIAL: CPT | Mod: GA

## 2017-12-29 NOTE — BH THERAPY
" [facility]  Therapy Progress Note    Patient Name: Av Bob  Patient MRN: [id number]  Today's Date: 12/29/2017     Type of session:Individual psychotherapy  Length of session: 45 minutes  Persons in attendance:Patient    Subjective/New Info: Client arrived on time for individual therapy appointment. He reports that he has been doing pretty well since previous visit. Client discussed experiencing some depression regarding his current situation. He states that he does not feel like he has anyone to talk to about it. Client reports that he has a trip to [place] planned in April and is trying to decide whether or not to go. He reports that he would like to be able to walk by the time he goes. Writer and client explored ways in which client can reach his goal of walking including increasing physical therapy, eating healthy and increasing his social interaction.    Objective/Observations:   Participation: Active verbal participation   Grooming: Casual   Cognition: Alert   Eye contact: Limited   Mood: Euthymic   Affect: Constricted   Thought process: Logical and Goal-directed   Speech: Rate within normal limits and Volume within normal limits   Other:     Diagnoses:   1. Adjustment disorder with depressed mood         Current risk:   SUICIDE: Low   Homicide: Low   Self-harm: Low   Relapse: Low   Other:    Safety Plan reviewed? Not Indicated   If evidence of imminent risk is present, intervention/plan:     Therapeutic Intervention(s): Establish rapport, Goal-setting and Supportive psychotherapy    Treatment Goal(s)/Objective(s) addressed: Decrease symptoms of depression     Progress toward Treatment Goals: No change    Plan:  - \"Homework\" recommendation: Attend one rotary club meeting  - Next appointment scheduled:  1/12/2018    Mariella Levi, Ph.D.  12/29/2017                                 "

## 2017-12-31 LAB
BACTERIA UR CULT: NORMAL
SIGNIFICANT IND 70042: NORMAL
SITE SITE: NORMAL
SOURCE SOURCE: NORMAL

## 2018-01-02 ENCOUNTER — HOSPITAL ENCOUNTER (OUTPATIENT)
Dept: RADIOLOGY | Facility: MEDICAL CENTER | Age: 73
End: 2018-01-02
Attending: INTERNAL MEDICINE
Payer: MEDICARE

## 2018-01-02 DIAGNOSIS — I25.10 ATHEROSCLEROSIS OF NATIVE CORONARY ARTERY OF NATIVE HEART WITHOUT ANGINA PECTORIS: ICD-10-CM

## 2018-01-02 PROCEDURE — 700111 HCHG RX REV CODE 636 W/ 250 OVERRIDE (IP)

## 2018-01-02 PROCEDURE — A9502 TC99M TETROFOSMIN: HCPCS

## 2018-01-02 RX ORDER — REGADENOSON 0.08 MG/ML
INJECTION, SOLUTION INTRAVENOUS
Status: COMPLETED
Start: 2018-01-02 | End: 2018-01-02

## 2018-01-02 RX ADMIN — REGADENOSON 0.4 MG: 0.08 INJECTION, SOLUTION INTRAVENOUS at 10:00

## 2018-01-02 NOTE — PROGRESS NOTES
Nursing care plan includes knowledge deficit, potential for discomfort, potential for compromised cardiac output. POC includes teaching, comfort measures and reassurance, and access to code cart, cardiology stand by and availability of rapid response team. Pt verbalizes good understanding of benefits and risks of pharmacological cardiac stress test. Informed consent obtained. Lexiscan given, pt developed the following symptoms none VS stable, major symptoms resolved. To waiting room, caffeinated fluids and/or snacks given, awaiting second scan. Nursing goals met.

## 2018-01-03 ENCOUNTER — TELEPHONE (OUTPATIENT)
Dept: CARDIOLOGY | Facility: MEDICAL CENTER | Age: 73
End: 2018-01-03

## 2018-01-03 NOTE — TELEPHONE ENCOUNTER
----- Message from Laura Zavala sent at 1/3/2018 11:57 AM PST -----  Regarding: Patient's wife wants call back about clearance  BLANCO/Scarlett    Patient's wife, Usha, wants a call back at [phone] or [phone]. Her  was a patient of Dr Patterson. He had a Stress Test done yesterday and is scheduled for an upcoming Lithotripsy. She was told by Dr Patterson to call Osvaldo Tucker about clearance.    =====================================================================    S/w pt's wife (Usha), wife reports that the Urology procedure is scheduled on this coming Monday, informed her that since Dr Tucker has not seen the pt and he's out of the office until next week and Dr Patterson already retired, we would need to scheduled pt with APRN to clear him for then upcoming surgery and reviewed the stress test with them, wife agreed, pt is scheduled to see EVERT Figueredo on Fri 1/518 at 4pm.     Stress test result printed and will have Dr Perkins-ADD review

## 2018-01-04 ENCOUNTER — PHYSICAL THERAPY (OUTPATIENT)
Dept: PHYSICAL THERAPY | Facility: REHABILITATION | Age: 73
End: 2018-01-04
Attending: FAMILY MEDICINE
Payer: MEDICARE

## 2018-01-04 DIAGNOSIS — Z99.3 WHEELCHAIR DEPENDENT: ICD-10-CM

## 2018-01-04 PROCEDURE — 97110 THERAPEUTIC EXERCISES: CPT

## 2018-01-04 PROCEDURE — 97112 NEUROMUSCULAR REEDUCATION: CPT

## 2018-01-04 PROCEDURE — 97116 GAIT TRAINING THERAPY: CPT

## 2018-01-04 NOTE — OP THERAPY DAILY TREATMENT
Outpatient Physical Therapy  DAILY TREATMENT     [facility] Physical [address].  [address]  [place] [address]  Phone:  [phone]  Fax:  [phone]    Date: 01/04/2018    Patient: Av Bob  YOB: 1945  MRN: [id number]     Time Calculation             Chief Complaint: No chief complaint on file.    Visit #: 12    SUBJECTIVE:  Feeling good today, been walking more at home, been using fww with left forearm trough at home       OBJECTIVE:  Current objective measures: Min assist transfer w/c to NU step and sit to stand w/c to forearm trough FWW, Max assist don/doff AFO         Therapeutic Exercises (CPT 96888):     Total treatment time spent on Therapeutic Exercises: 20 minutes.      1. Sit to stand w/c to fww min assist, 1 x 10    2. Nu step L2, 15 min    Therapeutic Treatments and Modalities:     1. Neuromuscular Re-education (CPT 99351), 10 minutes, standing static   @ CGA assist.  3 x 1 min with no UE support    2. Gait Training (CPT 29172), 25 min minutes, with fww/left forearm trough and left AFO 2 x 120 feet,@[id number]       Pain rating before treatment: 1  Pain rating after treatment: 1    ASSESSMENT:   Response to treatment: improved mood, participation and tolerance to therapy today.  Patient demonstrates increased confidence using forearm trough walker but requires verbal cueing to not lean forward with trough/fww and to keep hips inside of walker.      PLAN/RECOMMENDATIONS:   Plan for treatment: therapy treatment to continue next visit.  Planned interventions for next visit: functional training/self care (CPT 69909), gait training (CPT 00081), neuromuscular re-education (CPT 70141) and therapeutic exercise (CPT 42788).

## 2018-01-05 ENCOUNTER — OFFICE VISIT (OUTPATIENT)
Dept: CARDIOLOGY | Facility: MEDICAL CENTER | Age: 73
End: 2018-01-05
Payer: MEDICARE

## 2018-01-05 ENCOUNTER — TELEPHONE (OUTPATIENT)
Dept: CARDIOLOGY | Facility: MEDICAL CENTER | Age: 73
End: 2018-01-05

## 2018-01-05 VITALS
HEIGHT: 72 IN | OXYGEN SATURATION: 99 % | DIASTOLIC BLOOD PRESSURE: 72 MMHG | HEART RATE: 68 BPM | SYSTOLIC BLOOD PRESSURE: 134 MMHG

## 2018-01-05 DIAGNOSIS — I48.0 PAROXYSMAL ATRIAL FIBRILLATION (HCC): ICD-10-CM

## 2018-01-05 DIAGNOSIS — I10 ESSENTIAL HYPERTENSION, BENIGN: ICD-10-CM

## 2018-01-05 DIAGNOSIS — E78.2 MIXED HYPERLIPIDEMIA: ICD-10-CM

## 2018-01-05 DIAGNOSIS — I25.10 ATHEROSCLEROSIS OF NATIVE CORONARY ARTERY OF NATIVE HEART WITHOUT ANGINA PECTORIS: ICD-10-CM

## 2018-01-05 DIAGNOSIS — I11.9 BENIGN HYPERTENSIVE HEART DISEASE WITHOUT HEART FAILURE: ICD-10-CM

## 2018-01-05 DIAGNOSIS — E11.9 CONTROLLED TYPE 2 DIABETES MELLITUS WITHOUT COMPLICATION, WITHOUT LONG-TERM CURRENT USE OF INSULIN (HCC): ICD-10-CM

## 2018-01-05 PROBLEM — E83.42 HYPOMAGNESEMIA: Status: RESOLVED | Noted: 2017-08-12 | Resolved: 2018-01-05

## 2018-01-05 PROBLEM — F43.21 ADJUSTMENT DISORDER WITH DEPRESSED MOOD: Status: RESOLVED | Noted: 2017-12-14 | Resolved: 2018-01-05

## 2018-01-05 PROBLEM — N39.0 ENTEROCOCCUS UTI: Status: RESOLVED | Noted: 2017-11-30 | Resolved: 2018-01-05

## 2018-01-05 PROBLEM — B95.2 ENTEROCOCCUS UTI: Status: RESOLVED | Noted: 2017-11-30 | Resolved: 2018-01-05

## 2018-01-05 PROBLEM — R19.5 LOOSE STOOLS: Status: RESOLVED | Noted: 2017-11-21 | Resolved: 2018-01-05

## 2018-01-05 PROBLEM — N19 ACUTE PRERENAL AZOTEMIA: Status: RESOLVED | Noted: 2017-11-21 | Resolved: 2018-01-05

## 2018-01-05 PROCEDURE — 99214 OFFICE O/P EST MOD 30 MIN: CPT | Performed by: NURSE PRACTITIONER

## 2018-01-05 ASSESSMENT — ENCOUNTER SYMPTOMS
PND: 0
FEVER: 0
PALPITATIONS: 0
FOCAL WEAKNESS: 1
CLAUDICATION: 0
SHORTNESS OF BREATH: 0
ORTHOPNEA: 0
MYALGIAS: 0
WEAKNESS: 1
COUGH: 0
ABDOMINAL PAIN: 0

## 2018-01-05 NOTE — LETTER
[facility]   [address], [facility] [address]  [place] [address]  Phone: [phone]  Fax: [phone]              Av Bob  1945    Encounter Date: 1/5/2018    CHANELLE Flood          PROGRESS NOTE:  Subjective:   Av Bob is a 72 y.o. male who presents today for surgical clearance for lithotripsy in the setting of abnormal MPI with hx of significant CAD with previous stenting.    He is a previous patient of Dr. Patterson in our office, he will be set up with Dr. Tucker. Hx of CAD (GIOVANNA to RCA in '97, GIOVANNA X2 to LAD and GIOVANNA X to OM in '09), CVA, CKD, DM, HTN, HLD, large cell lymphoma, chronic left wound infection, and frequent hospitalizations for sepsis and rehabilitation.     He is in good spirits today and is working towards recovery with rehabilitation with PT. He can now walk with a cane but uses a wheelchair most of the time.    He is planning on having a lithotripsy for recurrent kidney stone with Dr. Barry soon but needs cardiac clearance and just recently before Dr. Patterson retired, had an abnormal stress imaging that needs discussion before the operation.     He does tell me he is chronically fatigued with his multiple medical issues, and has been having some atypical chest pains recently on the left side that go away with rest.    He was recently admitted in [place] for septic shock with urosepsis. Elevated troponin and abnormal MPI during that visit, but no angiogram was performed. They were told to follow up with this outpatient.    Past Medical History:   Diagnosis Date   • Acute respiratory failure with hypoxia (CMS-HCC) 5/20/2017   • CAD (coronary artery disease)     GIOVANNA to RCA in '97, GIOVANNA X2 to LAD and GIOVANNA X2 to OM in '09   • Cerebrovascular accident (CVA) (CMS-HCC) 12/30/2016    Left arm weakness  etiology of stroke not established, lymphoma discovered on MRI evaluation of stroke, L hemiparesis much  worse after acute infectious illness in mid 2017, but no specific diagnosed recurrent neurological etiology, all at [facility]   • CKD (chronic kidney disease) stage 3, GFR 30-59 ml/min    • Controlled gout 2014   • Coronary atherosclerosis of native coronary artery     S/P PTCA (percutaneous transluminal coronary angioplasty), RCA, 5/1997, patent on cath 7/10/2009 at the time of interventions on his left anterior descending and circumflex coronary arteries   • Diabetes (CMS-HCC)    • Enterococcal septicemia (CMS-HCC) 8/12/2017   • Hypertension    • Hypokalemia 2012    controlled with combination of ACE inhibitor or ARB plus spironolactone   • Hypomagnesemia 08/12/2017    etiology uncertain   • 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 Barry   • NSTEMI (non-ST elevated myocardial infarction) (CMS-HCC) 07/18/2017    complicating UTI with sepsis   • Polyneuropathy in diabetes(357.2) 9/11/2013   • Septic shock (CMS-HCC) 5/20/2017   • Skin ulcer of calf (CMS-HCC) 2015    Right, Dr. Terry and wound care   • Wound of left leg 2012    Requiring surgery and debridment, Dr. Moore     Past Surgical History:   Procedure Laterality Date   • WOUND CLOSURE GENERAL  4/3/2012    Performed by GERHARD MOORE at SURGERY SAME DAY [facility]   • ANGIOPLASTY  1997    RCA followed by other stents as noted above.    • CARDIAC CATH     • CATARACT EXTRACTION WITH IOL      bilateral   • LITHOTRIPSY     • TONSILLECTOMY AND ADENOIDECTOMY       Family History   Problem Relation Age of Onset   • Heart Disease Father      CAD   • Diabetes Father    • Cancer Mother    • Psychiatry Mother      Depression   • Kidney stones Brother    • Heart Disease Brother    • Psychiatry Brother      Depression     History   Smoking Status   • Never Smoker   Smokeless Tobacco   • Never Used     Allergies   Allergen Reactions   •  Diphenhydramine Hcl Anxiety     Pt is able to tolerate  Mg benadryl with less anxiety   • Lorazepam Unspecified     Disorientation   • Ciprofloxacin      Rash,stomach ache   • Nkda [No Known Drug Allergy]      Outpatient Encounter Prescriptions as of 1/5/2018   Medication Sig Dispense Refill   • fluoxetine (PROZAC) 20 MG Cap Take 2 Caps by mouth every day. 90 Cap 0   • magnesium oxide (MAG-OX) 400 MG Tab Take 400 mg by mouth 3 times a day.     • vitamin D (CHOLECALCIFEROL) 1000 UNIT Tab Take 2,000 Units by mouth every day.     • saccharomyces boulardii (FLORASTOR) 250 MG Cap Take 250 mg by mouth 2 Times a Day.     • metoprolol (TOPROL-XL) 200 MG XL tablet Take 1 Tab by mouth every day. 90 Tab 3   • Melatonin 5 MG Tab Take 1-2 Tabs by mouth at bedtime as needed.     • fenofibrate (TRICOR) 145 MG Tab Take 1 Tab by mouth every day. 90 Tab 3   • tramadol (ULTRAM) 50 MG Tab Take 50 mg by mouth every four hours as needed.     • losartan (COZAAR) 50 MG Tab Take 1 Tab by mouth every day. 30 Tab 6   • spironolactone (ALDACTONE) 50 MG Tab Take 1 Tab by mouth every day. 90 Tab 3   • Insulin Glargine (TOUJEO SOLOSTAR SC) Inject 15 Units as instructed every evening. use 15 units nightly according to endocrenologist instruction on 3/23/17     • loperamide (IMODIUM A-D) 2 MG Cap Take 2 mg by mouth 4 times a day as needed for Diarrhea.     • aspirin 81 MG tablet Take 81 mg by mouth every day. ON HOLD     • Acetaminophen 325 MG Cap Take 650 mg by mouth 4 times a day as needed (Pain).     • insulin lispro, Human, (HUMALOG) 100 UNIT/ML Solution Pen-injector injection Using up to 50 units per day as directed. 45 mL 2   • nystatin (MYCOSTATIN) Powder Apply 1 Dose to affected area(s) 3 times a day as needed. groin rash     • ascorbic acid (VITAMIN C) 500 MG/5ML syrup Take 500 mg by mouth every day.     • atorvastatin (LIPITOR) 40 MG Tab Take 0.5 Tabs by mouth every day. 90 Tab 3   • clopidogrel (PLAVIX) 75 MG Tab Take 1 Tab by mouth  every day. (Patient taking differently: Take 75 mg by mouth every day. ON HOLD) 90 Tab 3   • allopurinol (ZYLOPRIM) 300 MG Tab Take 1 Tab by mouth every day. 90 Tab 3   • nitroglycerin (NITROSTAT) 0.4 MG SL Tab Place 1 Tab under tongue as needed for Chest Pain. 25 Tab 6   • B-D UF III MINI PEN NEEDLES 31G X 5 MM Misc USE AS DIRECTED WITH INSULIN INJECTIONS 450 Each 2   • CENTRUM SILVER PO Take 1 Tab by mouth every day.       No facility-administered encounter medications on file as of 1/5/2018.      Review of Systems   Constitutional: Positive for malaise/fatigue. Negative for fever.   Respiratory: Negative for cough and shortness of breath.    Cardiovascular: Positive for chest pain. Negative for palpitations, orthopnea, claudication, leg swelling and PND.   Gastrointestinal: Negative for abdominal pain.   Musculoskeletal: Negative for myalgias.   Neurological: Positive for focal weakness and weakness.   All other systems reviewed and are negative.       Objective:   /72   Pulse 68   Ht 1.829 m (6')   SpO2 99%     Physical Exam   Constitutional: He is oriented to person, place, and time. He appears well-developed and well-nourished. No distress.   HENT:   Head: Normocephalic and atraumatic.   Eyes: EOM are normal.   Neck: Normal range of motion. No JVD present.   Cardiovascular: Normal rate, regular rhythm, normal heart sounds and intact distal pulses.    No murmur heard.  Pulmonary/Chest: Effort normal and breath sounds normal. No respiratory distress. He has no wheezes. He has no rales.   Abdominal: Soft. Bowel sounds are normal.   Musculoskeletal:   Cane or wheelchair with weakness on L side S/P CVA   Neurological: He is alert and oriented to person, place, and time.   Skin: Skin is warm and dry.   Psychiatric: He has a normal mood and affect.   Nursing note and vitals reviewed.      Assessment:     1. Atherosclerosis of native coronary artery of native heart without angina pectoris     2. Benign  hypertensive heart disease without heart failure     3. Controlled type 2 diabetes mellitus without complication, without long-term current use of insulin (CMS-HCC)     4. Paroxysmal atrial fibrillation (CMS-HCC)     5. Essential hypertension, benign     6. Mixed hyperlipidemia       Medical Decision Making:  Today's Assessment / Status / Plan:     1. CAD with 5 stents (see previous cath report in '09), atypical chest pain in the setting of known CAD and abnormal MPI with small area of inducible ischemia. Interventionalist to review cath films and make decision on if repeat angiogram is warranted prior to surgery. Then will call patient with decision. Cont toprol, cont ASA and plavix if angiogram ordered. If not, continue to hold for lithotripsy as discussed by Dr. Patterson previously.    2. HTN, good control on bb, arb, aldactone. Follow at home.    3. DM, managed with insulin. Followed by PCP.    4. PAF, not noted in last note per Dr. Patterson. Follow clinically. ASA, plavix, and toprol at this point. Discuss with Dr. Tucker at next follow up.    5. HLD, statin and fibrate. Yearly CMP and lipid.    6. CVA, deficits on L side. Therapies continued. Continue ASA, plavix, statin.    FU in clinic in 3 months with BLANCO as new patient; see above-will call patient soon with plan of care regarding repeat angiogram    Patient verbalizes understanding and agrees with the plan of care.     Collaborating MD: Maddie GORDON        No Recipients

## 2018-01-05 NOTE — PROGRESS NOTES
Subjective:   Av Bob is a 72 y.o. male who presents today for surgical clearance for lithotripsy in the setting of abnormal MPI with hx of significant CAD with previous stenting.    He is a previous patient of Dr. Patterson in our office, he will be set up with Dr. Tucker. Hx of CAD (GIOVANNA to RCA in '97, GIOVANNA X2 to LAD and GIOVANNA X to OM in '09), CVA, CKD, DM, HTN, HLD, large cell lymphoma, chronic left wound infection, and frequent hospitalizations for sepsis and rehabilitation.     He is in good spirits today and is working towards recovery with rehabilitation with PT. He can now walk with a cane but uses a wheelchair most of the time.    He is planning on having a lithotripsy for recurrent kidney stone with Dr. Barry soon but needs cardiac clearance and just recently before Dr. Patterson retired, had an abnormal stress imaging that needs discussion before the operation.     He does tell me he is chronically fatigued with his multiple medical issues, and has been having some atypical chest pains recently on the left side that go away with rest.    He was recently admitted in [place] for septic shock with urosepsis. Elevated troponin and abnormal MPI during that visit, but no angiogram was performed. They were told to follow up with this outpatient.    Past Medical History:   Diagnosis Date   • Acute respiratory failure with hypoxia (CMS-HCC) 5/20/2017   • CAD (coronary artery disease)     GIOVANNA to RCA in '97, GIOVANNA X2 to LAD and GIOVANNA X2 to OM in '09   • Cerebrovascular accident (CVA) (CMS-HCC) 12/30/2016    Left arm weakness  etiology of stroke not established, lymphoma discovered on MRI evaluation of stroke, L hemiparesis much worse after acute infectious illness in mid 2017, but no specific diagnosed recurrent neurological etiology, all at [facility]   • CKD (chronic kidney disease) stage 3, GFR 30-59 ml/min    • Controlled gout 2014   • Coronary atherosclerosis of  native coronary artery     S/P PTCA (percutaneous transluminal coronary angioplasty), RCA, 5/1997, patent on cath 7/10/2009 at the time of interventions on his left anterior descending and circumflex coronary arteries   • Diabetes (CMS-HCC)    • Enterococcal septicemia (CMS-HCC) 8/12/2017   • Hypertension    • Hypokalemia 2012    controlled with combination of ACE inhibitor or ARB plus spironolactone   • Hypomagnesemia 08/12/2017    etiology uncertain   • 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 Barry   • NSTEMI (non-ST elevated myocardial infarction) (CMS-HCC) 07/18/2017    complicating UTI with sepsis   • Polyneuropathy in diabetes(357.2) 9/11/2013   • Septic shock (CMS-HCC) 5/20/2017   • Skin ulcer of calf (CMS-HCC) 2015    Right, Dr. Terry and wound care   • Wound of left leg 2012    Requiring surgery and debridment, Dr. Moore     Past Surgical History:   Procedure Laterality Date   • WOUND CLOSURE GENERAL  4/3/2012    Performed by GERHARD MOORE at SURGERY SAME DAY [facility]   • ANGIOPLASTY  1997    RCA followed by other stents as noted above.    • CARDIAC CATH     • CATARACT EXTRACTION WITH IOL      bilateral   • LITHOTRIPSY     • TONSILLECTOMY AND ADENOIDECTOMY       Family History   Problem Relation Age of Onset   • Heart Disease Father      CAD   • Diabetes Father    • Cancer Mother    • Psychiatry Mother      Depression   • Kidney stones Brother    • Heart Disease Brother    • Psychiatry Brother      Depression     History   Smoking Status   • Never Smoker   Smokeless Tobacco   • Never Used     Allergies   Allergen Reactions   • Diphenhydramine Hcl Anxiety     Pt is able to tolerate  Mg benadryl with less anxiety   • Lorazepam Unspecified     Disorientation   • Ciprofloxacin      Rash,stomach ache   • Nkda [No Known Drug Allergy]      Outpatient Encounter Prescriptions as of 1/5/2018   Medication Sig  Dispense Refill   • fluoxetine (PROZAC) 20 MG Cap Take 2 Caps by mouth every day. 90 Cap 0   • magnesium oxide (MAG-OX) 400 MG Tab Take 400 mg by mouth 3 times a day.     • vitamin D (CHOLECALCIFEROL) 1000 UNIT Tab Take 2,000 Units by mouth every day.     • saccharomyces boulardii (FLORASTOR) 250 MG Cap Take 250 mg by mouth 2 Times a Day.     • metoprolol (TOPROL-XL) 200 MG XL tablet Take 1 Tab by mouth every day. 90 Tab 3   • Melatonin 5 MG Tab Take 1-2 Tabs by mouth at bedtime as needed.     • fenofibrate (TRICOR) 145 MG Tab Take 1 Tab by mouth every day. 90 Tab 3   • tramadol (ULTRAM) 50 MG Tab Take 50 mg by mouth every four hours as needed.     • losartan (COZAAR) 50 MG Tab Take 1 Tab by mouth every day. 30 Tab 6   • spironolactone (ALDACTONE) 50 MG Tab Take 1 Tab by mouth every day. 90 Tab 3   • Insulin Glargine (TOUJEO SOLOSTAR SC) Inject 15 Units as instructed every evening. use 15 units nightly according to endocrenologist instruction on 3/23/17     • loperamide (IMODIUM A-D) 2 MG Cap Take 2 mg by mouth 4 times a day as needed for Diarrhea.     • aspirin 81 MG tablet Take 81 mg by mouth every day. ON HOLD     • Acetaminophen 325 MG Cap Take 650 mg by mouth 4 times a day as needed (Pain).     • insulin lispro, Human, (HUMALOG) 100 UNIT/ML Solution Pen-injector injection Using up to 50 units per day as directed. 45 mL 2   • nystatin (MYCOSTATIN) Powder Apply 1 Dose to affected area(s) 3 times a day as needed. groin rash     • ascorbic acid (VITAMIN C) 500 MG/5ML syrup Take 500 mg by mouth every day.     • atorvastatin (LIPITOR) 40 MG Tab Take 0.5 Tabs by mouth every day. 90 Tab 3   • clopidogrel (PLAVIX) 75 MG Tab Take 1 Tab by mouth every day. (Patient taking differently: Take 75 mg by mouth every day. ON HOLD) 90 Tab 3   • allopurinol (ZYLOPRIM) 300 MG Tab Take 1 Tab by mouth every day. 90 Tab 3   • nitroglycerin (NITROSTAT) 0.4 MG SL Tab Place 1 Tab under tongue as needed for Chest Pain. 25 Tab 6   • B-D UF  III MINI PEN NEEDLES 31G X 5 MM Misc USE AS DIRECTED WITH INSULIN INJECTIONS 450 Each 2   • CENTRUM SILVER PO Take 1 Tab by mouth every day.       No facility-administered encounter medications on file as of 1/5/2018.      Review of Systems   Constitutional: Positive for malaise/fatigue. Negative for fever.   Respiratory: Negative for cough and shortness of breath.    Cardiovascular: Positive for chest pain. Negative for palpitations, orthopnea, claudication, leg swelling and PND.   Gastrointestinal: Negative for abdominal pain.   Musculoskeletal: Negative for myalgias.   Neurological: Positive for focal weakness and weakness.   All other systems reviewed and are negative.       Objective:   /72   Pulse 68   Ht 1.829 m (6')   SpO2 99%     Physical Exam   Constitutional: He is oriented to person, place, and time. He appears well-developed and well-nourished. No distress.   HENT:   Head: Normocephalic and atraumatic.   Eyes: EOM are normal.   Neck: Normal range of motion. No JVD present.   Cardiovascular: Normal rate, regular rhythm, normal heart sounds and intact distal pulses.    No murmur heard.  Pulmonary/Chest: Effort normal and breath sounds normal. No respiratory distress. He has no wheezes. He has no rales.   Abdominal: Soft. Bowel sounds are normal.   Musculoskeletal:   Cane or wheelchair with weakness on L side S/P CVA   Neurological: He is alert and oriented to person, place, and time.   Skin: Skin is warm and dry.   Psychiatric: He has a normal mood and affect.   Nursing note and vitals reviewed.      Assessment:     1. Atherosclerosis of native coronary artery of native heart without angina pectoris     2. Benign hypertensive heart disease without heart failure     3. Controlled type 2 diabetes mellitus without complication, without long-term current use of insulin (CMS-[facility])     4. Paroxysmal atrial fibrillation (CMS-[facility])     5. Essential hypertension, benign     6. Mixed hyperlipidemia        Medical Decision Making:  Today's Assessment / Status / Plan:     1. CAD with 5 stents (see previous cath report in '09), atypical chest pain in the setting of known CAD and abnormal MPI with small area of inducible ischemia. Interventionalist to review cath films and make decision on if repeat angiogram is warranted prior to surgery. Then will call patient with decision. Cont toprol, cont ASA and plavix if angiogram ordered. If not, continue to hold for lithotripsy as discussed by Dr. Patterson previously.    2. HTN, good control on bb, arb, aldactone. Follow at home.    3. DM, managed with insulin. Followed by PCP.    4. PAF, not noted in last note per Dr. Patterson. Follow clinically. ASA, plavix, and toprol at this point. Discuss with Dr. Tucker at next follow up.    5. HLD, statin and fibrate. Yearly CMP and lipid.    6. CVA, deficits on L side. Therapies continued. Continue ASA, plavix, statin.    FU in clinic in 3 months with BLANCO as new patient; see above-will call patient soon with plan of care regarding repeat angiogram    Patient verbalizes understanding and agrees with the plan of care.     Collaborating MD: Maddie GORDON

## 2018-01-05 NOTE — LETTER
PROCEDURE/SURGERY CLEARANCE FORM      Encounter Date: 1/5/2018    Patient: Av Bob  YOB: 1945      CARDIOLOGIST:  CHANELLE Flood & Dr. Wiliam Monsalve     REFERRING DOCTOR:  Dr. Barry        The above patient may proceed with the following procedure/surgery at moderate cardiovascular risk: Lithotripsy                                           Additional comments: Although patient has had an abnormal stress test with small area of ischemia with known CAD and previous stent placements, benefit of lithotripsy outweighs the risk of further coronary evaluation at this time. He has been instructed to follow-up with cardiology postoperatively for further evaluation.                                                                          MD Signature   CHANELLE Flood/ Dr. Monsalve

## 2018-01-06 NOTE — TELEPHONE ENCOUNTER
Discussed with Dr. Monsalve about patient's abnormal stress test with small area of ischemia with known CAD and previous stent placements. The patient has no angina, but more atypical chest pain. With upcoming lithotripsy, the benefit of lithotripsy outweighs the risk of further coronary evaluation at this time. He recommends proceeding with surgical procedure and following up with our office post-operatively to evaluate further need for coronary eval. The patient and wife were made aware of this. My RN will write up surgical clearance with moderate risk and forward to Dr. Barry's office on Monday. SC

## 2018-01-08 NOTE — TELEPHONE ENCOUNTER
Letter composed per the above documentation and faxed to Dr. Barry's office at [facility] at [phone].

## 2018-01-09 ENCOUNTER — OCCUPATIONAL THERAPY (OUTPATIENT)
Dept: OCCUPATIONAL THERAPY | Facility: REHABILITATION | Age: 73
End: 2018-01-09
Attending: FAMILY MEDICINE
Payer: MEDICARE

## 2018-01-09 ENCOUNTER — PHYSICAL THERAPY (OUTPATIENT)
Dept: PHYSICAL THERAPY | Facility: REHABILITATION | Age: 73
End: 2018-01-09
Attending: FAMILY MEDICINE
Payer: MEDICARE

## 2018-01-09 DIAGNOSIS — Z99.3 WHEELCHAIR DEPENDENT: ICD-10-CM

## 2018-01-09 DIAGNOSIS — Z86.73 PERSONAL HISTORY OF TRANSIENT CEREBRAL ISCHEMIA: ICD-10-CM

## 2018-01-09 PROCEDURE — 97110 THERAPEUTIC EXERCISES: CPT

## 2018-01-09 PROCEDURE — 97112 NEUROMUSCULAR REEDUCATION: CPT

## 2018-01-09 PROCEDURE — 97116 GAIT TRAINING THERAPY: CPT

## 2018-01-09 ASSESSMENT — ACTIVITIES OF DAILY LIVING (ADL): POOR_BALANCE: 1

## 2018-01-09 NOTE — OP THERAPY DAILY TREATMENT
"  Outpatient Occupational Therapy   NEUROLOGICAL DAILY TREATMENT     [facility]ational [facility] [address]  [address]  [place] [address]  Phone:  [phone]  Fax:  [phone]    Date: 01/09/2018    Patient: Av Bob  YOB: 1945  MRN: [id number]     Time Calculation  Start time: 0900  Stop time: 1000 Time Calculation (min): 60 minutes     Chief Complaint: Extremity Weakness    Visit #: 6    Subjective \"I'm in a routine where I'm exercising every day\"    Objective:     Strength:   Upper extremity strength (left):     Shoulder flexion: 1    Shoulder extension:  2-    Shoulder abduction: 2-    Elbow flexion: 3-    Elbow extension: 3-    Forearm pronation: 2-    Forearm supination: 2-    Wrist flexion: 2-    Wrist extension: 2-    Fingers flexion: 3    Fingers extension: 2-    Strength Comments:  Left digits with 1+ edema    Tone, Sensation and Coordination:   Tone:     Left upper extremity muscle tone: Spastic    Modified Deuce:   Upper extremity (left):     Wrist flexors: 1+    Finger flexors: 1+         Therapeutic Treatments and Modalities:    1. Neuromuscular Re-education (CPT 53275), 60 minutes    Therapeutic Treatments and Modalities Summary: LUE passive sustained stretch shoulder to digits within pain tolerance for shoulder.   Active assisted digit gross flex/ext (active full flex, ext 3/4 range), WB through forearm with foam roll in hand changing positions of elbow flexion x 10 reps.  WB forward through wrist with changes in degrees of elbow flexion and tactile input to facilitate co-contraction of biceps/triceps.   Elbow flex/ext table top with light resistance to increase strength of contractions via isometrics combined with muscle belly tapping x 10 reps.  Shoulder/elbow forward/back movements with therapist assist for elbow extension shoulder extension.  Taped digits 3-5 for increase gross digit extension, digits 2-5 taped for 20 degrees below full " extension. Updated HEP with good understanding.      Assessment, Response and Plan:   Impairments: abnormal muscle firing, abnormal muscle tone, abnormal or restricted ROM, impaired balance, impaired physical strength, lacks appropriate home exercise program, limited ADL's and limited mobility    Assessment details:  Pt making slow but steady progress with volitional LUE movement in response to NM re-ed incorporating facilitory techniques.  Increased strength of co-contractions of biceps/triceps during WB activities as well as gross digit extension despite influence of flexor spasticity.  RH edema decreased today.  Reviewed and updated HEP for stretching, strengthening, and retrograde massage with good understanding.  Pt more animated and interactive during session.  Prognosis: good      Plan:   Therapy options:  Occupational therapy treatment to continue  Planned therapy interventions:  E Stim Attended (CPT 97401), Manual Therapy (CPT 17136), Therapeutic Exercise (CPT 15859), Therapeutic Activities (CPT 90789), TENS Applicaton (CPT 30841), Self Care ADL Training (CPT 32380) and Neuromuscular Re-education (CPT 89563)  Frequency:  2x week  Duration in weeks:  7  Duration in visits:  14  Discussed with:  Patient

## 2018-01-09 NOTE — OP THERAPY DAILY TREATMENT
Outpatient Physical Therapy  DAILY TREATMENT     [facility] [address]  [address]  [place] [address]  Phone:  [phone]  Fax:  [phone]    Date: 01/09/2018    Patient: Av Bob  YOB: 1945  MRN: [id number]     Time Calculation  Start time: 0800  Stop time: 0900 Time Calculation (min): 60 minutes     Chief Complaint: No chief complaint on file.    Visit #: 13    SUBJECTIVE:  Trying to walk everyday at home with forearm trough fww; did not sleep last night so feeling fatigued.  Left arm pain.      OBJECTIVE:  Current objective measures: Ambulated 215 feet with forearm trough fww @ cg-SBA         Therapeutic Exercises (CPT 49077):     Total treatment time spent on Therapeutic Exercises: 20 minutes.      1. Sit to stand w/c to fww min assist, 1 x 10    2. Nu step L2, 15 min    3. Seated clams, pink tband 1 x 30    Therapeutic Treatments and Modalities:     1. Neuromuscular Re-education (CPT 61477), 10min minutes, standing static at edge of raised mat   @ CGA assist.  2 x 2 min with no UE support, 1 x 5min with no UE support ball roll fwd backward and side to side    2. Gait Training (CPT 59017), 25 min minutes, with fww/left forearm trough and  1 x 215 feet, 1 x 150 feet,@sba-cg       Pain rating before treatment: 2  Pain rating after treatment: 1    ASSESSMENT:   Response to treatment: Patient demonstrating improved initiation and motivation throughout session.  50% increase in gait distance and standing tolerance.  Patient prefers no AFO with gait. Continues to require verbal cueing to stay safe distance from fww during gait.    PLAN/RECOMMENDATIONS:   Plan for treatment: therapy treatment to continue next visit.  Planned interventions for next visit: functional training/self care (CPT 22027), gait training (CPT 08052), neuromuscular re-education (CPT 86157) and therapeutic exercise (CPT 67417).

## 2018-01-11 ENCOUNTER — OFFICE VISIT (OUTPATIENT)
Dept: NEPHROLOGY | Facility: MEDICAL CENTER | Age: 73
End: 2018-01-11
Payer: MEDICARE

## 2018-01-11 VITALS
DIASTOLIC BLOOD PRESSURE: 72 MMHG | SYSTOLIC BLOOD PRESSURE: 130 MMHG | HEART RATE: 68 BPM | TEMPERATURE: 97.4 F | BODY MASS INDEX: 26.41 KG/M2 | WEIGHT: 195 LBS | OXYGEN SATURATION: 98 % | HEIGHT: 72 IN | RESPIRATION RATE: 12 BRPM

## 2018-01-11 DIAGNOSIS — E11.29 MICROALBUMINURIA DUE TO TYPE 2 DIABETES MELLITUS (HCC): ICD-10-CM

## 2018-01-11 DIAGNOSIS — R80.9 MICROALBUMINURIA DUE TO TYPE 2 DIABETES MELLITUS (HCC): ICD-10-CM

## 2018-01-11 DIAGNOSIS — I10 ESSENTIAL HYPERTENSION, BENIGN: ICD-10-CM

## 2018-01-11 DIAGNOSIS — N18.30 CKD (CHRONIC KIDNEY DISEASE), STAGE III (HCC): ICD-10-CM

## 2018-01-11 DIAGNOSIS — N20.0 NEPHROLITHIASIS: ICD-10-CM

## 2018-01-11 DIAGNOSIS — E55.9 VITAMIN D DEFICIENCY: ICD-10-CM

## 2018-01-11 PROCEDURE — 99214 OFFICE O/P EST MOD 30 MIN: CPT | Performed by: INTERNAL MEDICINE

## 2018-01-11 ASSESSMENT — ENCOUNTER SYMPTOMS
WEIGHT LOSS: 0
PALPITATIONS: 0
NAUSEA: 0
ABDOMINAL PAIN: 0
HEMOPTYSIS: 0
COUGH: 0
ORTHOPNEA: 0
HEARTBURN: 0
FLANK PAIN: 0
VOMITING: 0
DIARRHEA: 0
EYES NEGATIVE: 1
CHILLS: 0
SHORTNESS OF BREATH: 0
FEVER: 0
WHEEZING: 0

## 2018-01-11 NOTE — PROGRESS NOTES
Subjective:      Av Bob is a 72 y.o. male who presents with New Patient and Chronic Kidney Disease            HPI  Av is coming today for initial evaluation of CKD III, microalbuminuria  Has long term hx/of Nephrolithiasis -f/u with Urologist -  Baseline creatinine level at 1.2's -recently worse to 1.58 - had UTI improved to 1.27.  Doing better, no new complaints  No difficulties to urinate.  No dysuria/hematuria.  No flank pain  HTN: BP well controlled    Review of Systems   Constitutional: Negative for chills, fever, malaise/fatigue and weight loss.   HENT: Negative.    Eyes: Negative.    Respiratory: Negative for cough, hemoptysis, shortness of breath and wheezing.    Cardiovascular: Negative for chest pain, palpitations, orthopnea and leg swelling.   Gastrointestinal: Negative for abdominal pain, diarrhea, heartburn, nausea and vomiting.   Genitourinary: Negative for dysuria, flank pain, frequency, hematuria and urgency.   Skin: Negative.    All other systems reviewed and are negative.         Objective:     /72   Pulse 68   Temp 36.3 °C (97.4 °F)   Resp 12   Ht 1.829 m (6')   Wt 88.5 kg (195 lb)   SpO2 98%   BMI 26.45 kg/m²      Physical Exam   Constitutional: He is oriented to person, place, and time. He appears well-developed and well-nourished. No distress.   HENT:   Head: Normocephalic and atraumatic.   Nose: Nose normal.   Mouth/Throat: Oropharynx is clear and moist.   Eyes: Conjunctivae are normal. Pupils are equal, round, and reactive to light.   Neck: Normal range of motion. Neck supple.   Cardiovascular: Normal rate and regular rhythm.  Exam reveals no gallop and no friction rub.    Pulmonary/Chest: Effort normal and breath sounds normal. No respiratory distress. He has no wheezes. He has no rales.   Abdominal: Soft. Bowel sounds are normal. He exhibits no distension. There is no tenderness.   Musculoskeletal: He exhibits no edema.   Neurological: He is alert  and oriented to person, place, and time. No cranial nerve deficit. Coordination normal.   Nursing note and vitals reviewed.            Laboratory results reviewed:  Lab Results   Component Value Date/Time    CREATININE 1.27 12/12/2017 12:04 PM    CREATININE 1.4 05/28/2008 05:42 PM    POTASSIUM 4.3 12/12/2017 12:04 PM       Assessment/Plan:     1. CKD (chronic kidney disease), stage III      Creat stable at baseline  - BASIC METABOLIC PANEL; Future  - PTH INTACT (PTH ONLY); Future  - URINALYSIS; Future    2. Essential hypertension, benign      Well controlled    3. Microalbuminuria due to type 2 diabetes mellitus (CMS-HCC)      To monitor  - MICROALB/CREAT RATIO, RANDOM UR    4. Vitamin D deficiency    - PTH INTACT (PTH ONLY); Future  - VITAMIN D 25-HYDROXY    5. Nephrolithiasis      Scheduled lithotripsy  - PTH INTACT (PTH ONLY); Future    Recs; f/u in 2 months             To drink plenty of fluids              Avoid NSAID's              Monitor BP

## 2018-01-12 ENCOUNTER — OFFICE VISIT (OUTPATIENT)
Dept: BEHAVIORAL HEALTH | Facility: PHYSICIAN GROUP | Age: 73
End: 2018-01-12
Payer: MEDICARE

## 2018-01-12 ENCOUNTER — SPEECH THERAPY (OUTPATIENT)
Dept: SPEECH THERAPY | Facility: REHABILITATION | Age: 73
End: 2018-01-12
Attending: FAMILY MEDICINE
Payer: MEDICARE

## 2018-01-12 DIAGNOSIS — F43.21 ADJUSTMENT DISORDER WITH DEPRESSED MOOD: ICD-10-CM

## 2018-01-12 DIAGNOSIS — R41.841 COGNITIVE COMMUNICATION DEFICIT: ICD-10-CM

## 2018-01-12 PROCEDURE — G0515 COGNITIVE SKILLS DEVELOPMENT: HCPCS

## 2018-01-12 PROCEDURE — 90834 PSYTX W PT 45 MINUTES: CPT | Performed by: PSYCHOLOGIST

## 2018-01-12 PROCEDURE — G9167 ATTEN D/C STATUS: HCPCS | Mod: CJ

## 2018-01-12 PROCEDURE — G9166 ATTEN GOAL STATUS: HCPCS | Mod: CJ

## 2018-01-12 NOTE — BH THERAPY
[facility]  Therapy Progress Note    Patient Name: Av Bob  Patient MRN: [id number]  Today's Date: 1/12/2018    Type of session:Individual psychotherapy  Length of session: 45 minutes  Persons in attendance:Patient    Subjective/New Info: Client arrived on time for individual therapy appointment. He reports that he has been doing pretty well since previous visit. Client discussed his disappointment at having to cancel his trip to [place] in the spring due to his limited mobility. Writer and client discussed the things that bring client kimberley in his life and he was able to identify spending time with his grandchildren. Writer and client explored ways in which client can incorporate more kimberley into his life, including attending a rotary club meeting. Client discussed some of his options for planning trips this Spring and expressed his desire to be more mobile by then.     Objective/Observations:   Participation: Active verbal participation   Grooming: Casual   Cognition: Alert   Eye contact: Limited   Mood: Euthymic   Affect: Constricted   Thought process: Logical and Goal-directed   Speech: Rate within normal limits and Volume within normal limits   Other:     Diagnoses:   1. Adjustment disorder with depressed mood         Current risk:   SUICIDE: Low   Homicide: Low   Self-harm: Low   Relapse: Low   Other:    Safety Plan reviewed? Not Indicated   If evidence of imminent risk is present, intervention/plan:     Therapeutic Intervention(s): Leisure and recreation skills and Supportive psychotherapy    Treatment Goal(s)/Objective(s) addressed: Decrease symptoms of depression     Progress toward Treatment Goals: Mild improvement    Plan:  - Next appointment scheduled:  1/26/2018    Mariella Levi, Ph.D.  1/12/2018

## 2018-01-13 NOTE — OP THERAPY DAILY TREATMENT
"  Outpatient Speech Therapy  DAILY TREATMENT     [facility] Therapy [address].  [address]  [place] [address]  Phone:  [phone]  Fax:  [phone]    Date: 01/12/2018    Patient: Av oBb  YOB: 1945  MRN: [id number]     Time Calculation  Start time: 1501  Stop time: 1602 Time Calculation (min): 61 minutes     Chief Complaint: No chief complaint on file.    Visit #: 8    Subjective Evaluation  Pt tired but in good spirits.  Wife present before and after session for education.    Objective:   Treatments/Interventions Performed:  Cognitive-Linguistic training, Compensatory strategy training and Home program  Other Treatment Interventions:  58 mins cog-lingu  Objective Details:  Pt/wife noted that swallow is much better now that pt is using strategies and \"paying attention\" to his swallowing.  Reviewed goals and outcome assessment.  Pt has met 2/4 short term goals.  He is able to immediately recall 4 units of info with 90% however having diffiuclty with mental manipulation of info (50% accuracy).  Pt is able to generate 5-7 items in a category or to given letter in one min (goal not met). Pt has improved his divided and selective attention skills both visual and verbal.  Was 100% on basic selective attn task, 80% for visual attention/trailmaking (goal met).  He is also 80 to 100% with functional money/time math (goal met).  Pt was reassessed with MOCA v. 7.3.  Demonstrated improvement by 4 points.  Areas of noted improvement are with attention.  Short term and immediate memory are still decreased.  Pt is unable to recall any words after 5 min delay, able to recall 3/5 with semantic cue (improvement from start of therapy).  Overall, pt has mad good progress.  He would like to focus more on the physical aspect of his recovery at this time.          Speech Therapy Assessment:     Cognitive Linguistic Assessment:     Patient attention selective: Supervision Required   "  Patient attention divided: Minimal    Patient immediate memory: Moderate    Patient recent and short term memory: Moderate    Patient prospective and time delay memory: Moderate (mod-severe)    Patient ability to organize thoughts: Minimal    Patient executive functioning ability: Minimal    Cognitive-Linguistic comments: Slow processing of detailed info        Speech Therapy Plan :   Therapy Recommendations  Recommendation: No further speech therapy indicated at this time,  Plan Details:  Pt has [employer] brain game account to cont to work on.  Getting on a therapy schedule at home to decrease tv watching.

## 2018-01-13 NOTE — OP THERAPY DISCHARGE SUMMARY
Outpatient Speech Therapy  DISCHARGE SUMMARY NOTE      [facility] Speech Therapy [address].  [address]  [place] [address]  Phone:  [phone]  Fax:  [phone]    Date of Visit: 01/12/2018    Patient: Av Bob  YOB: 1945  MRN: [id number]     Referring Provider: Mitchell Pantoja M.D.  [address]  [address]  [place] [address]   Referring Diagnosis: Personal history of transient ischemic attack (TIA), and cerebral infarction without residual deficits [Z86.73];Dependence on wheelchair [Z99.3]     Visit #: 8    Time Calculation             Speech Therapy Occurrence Codes    Date of Onset of Impairment:  5/16/17   Date speech therapy care plan established or reviewed:  11/30/17   Date speech therapy treatment started:  11/30/17          Chief Complaint: No chief complaint on file.    Visit Diagnoses     ICD-10-CM   1. Cognitive communication deficit R41.841       Subjective Evaluation  Pt has demonstrated fair motivation and compliance.  He is most concerned with his physical recovery and walking.      Objective:   Treatments/Interventions Performed:  Cognitive-Linguistic training, Compensatory strategy training and Home program      Speech Therapy Assessment:     Cognitive Linguistic Assessment:     Patient attention selective: Supervision Required    Patient attention divided: Minimal    Patient immediate memory: Moderate    Patient recent and short term memory: Moderate    Patient prospective and time delay memory: Moderate (mod/sev)    Patient ability to organize thoughts: Minimal    Patient complex reasoning ability: Minimal    Patient executive functioning ability: Minimal    Patient insight to safety awareness: Minimal    Patient decision making and planning ability: Moderate    Patient initiation and self monitoring ability: Minimal (impulsive)    Patient spontaneous clock drawing ability: Minimal (decreased hand placment)        Speech Therapy Plan :    Prognosis & Recommendations  Impression Summary:  Overall good progress.  Continues to present with moderate memory and processing deficits, mild attention deficits.  Improved MOCA score from 17/30 (moderate) on 11/30/17 to 21/30 (mild) on 1/12/18.  Greatest improvement is in area of attention.   Patient progression on Short Term Goals:   1.  Pt will recall 4 units of info immediately and answer a mental manipulation question with 80%.  - GOAL PART MET, immediate recall 90%, mental manipulation 50%, increased with repetitions.     2.  Pt will generate 8-10 items in a category or to given letter in one minute. PART MET, naming 5-7 items in category or to given letter.      3.  Pt will demo visual and auditory divided attention skills 80% min A. GOAL MET     4.  Pt will complete functional time and money math 80% min A. GOAL MET  Patient progression on Long Term Goals:  1.  Pt will demo functional attention skills for daily tasks and communication with use of strategies. -GOAL MET     2.  Pt will demo use of external and internal memory strategies to recall daily events, maintain safety and participate socially. PART MET, continues to rely on wife.  Decreased motivation for goal.         Functional Limitation G-Codes and Severity Modifiers     Goal:   CJ   Discharge:   CJ

## 2018-01-15 ENCOUNTER — OFFICE VISIT (OUTPATIENT)
Dept: ENDOCRINOLOGY | Facility: MEDICAL CENTER | Age: 73
End: 2018-01-15
Payer: MEDICARE

## 2018-01-15 VITALS
BODY MASS INDEX: 26.41 KG/M2 | SYSTOLIC BLOOD PRESSURE: 122 MMHG | DIASTOLIC BLOOD PRESSURE: 75 MMHG | HEIGHT: 72 IN | OXYGEN SATURATION: 99 % | WEIGHT: 195 LBS | HEART RATE: 62 BPM

## 2018-01-15 DIAGNOSIS — E78.2 MIXED HYPERLIPIDEMIA: ICD-10-CM

## 2018-01-15 DIAGNOSIS — E11.65 TYPE 2 DIABETES MELLITUS WITH HYPERGLYCEMIA, WITH LONG-TERM CURRENT USE OF INSULIN (HCC): ICD-10-CM

## 2018-01-15 DIAGNOSIS — I10 ESSENTIAL HYPERTENSION: ICD-10-CM

## 2018-01-15 DIAGNOSIS — Z79.4 TYPE 2 DIABETES MELLITUS WITH HYPERGLYCEMIA, WITH LONG-TERM CURRENT USE OF INSULIN (HCC): ICD-10-CM

## 2018-01-15 LAB
HBA1C MFR BLD: 7.6 % (ref ?–5.8)
INT CON NEG: NEGATIVE
INT CON POS: POSITIVE

## 2018-01-15 PROCEDURE — 83036 HEMOGLOBIN GLYCOSYLATED A1C: CPT | Performed by: INTERNAL MEDICINE

## 2018-01-15 PROCEDURE — 99214 OFFICE O/P EST MOD 30 MIN: CPT | Performed by: INTERNAL MEDICINE

## 2018-01-16 ENCOUNTER — HOSPITAL ENCOUNTER (OUTPATIENT)
Facility: MEDICAL CENTER | Age: 73
End: 2018-01-16
Attending: FAMILY MEDICINE
Payer: MEDICARE

## 2018-01-16 PROCEDURE — 82274 ASSAY TEST FOR BLOOD FECAL: CPT

## 2018-01-17 ENCOUNTER — OCCUPATIONAL THERAPY (OUTPATIENT)
Dept: OCCUPATIONAL THERAPY | Facility: REHABILITATION | Age: 73
End: 2018-01-17
Attending: FAMILY MEDICINE
Payer: MEDICARE

## 2018-01-17 ENCOUNTER — TELEPHONE (OUTPATIENT)
Dept: ENDOCRINOLOGY | Facility: MEDICAL CENTER | Age: 73
End: 2018-01-17

## 2018-01-17 DIAGNOSIS — Z86.73 PERSONAL HISTORY OF TRANSIENT CEREBRAL ISCHEMIA: ICD-10-CM

## 2018-01-17 DIAGNOSIS — S91.009S: ICD-10-CM

## 2018-01-17 PROCEDURE — 97112 NEUROMUSCULAR REEDUCATION: CPT

## 2018-01-17 NOTE — OP THERAPY DAILY TREATMENT
"  Outpatient Occupational Therapy  DAILY TREATMENT     [facility] Occupational [address]  [address]  [place] [address]  Phone:  [phone]  Fax:  [phone]    Date: 01/17/2018    Patient: Av Bob  YOB: 1945  MRN: [id number]     Time Calculation  Start time: 1035  Stop time: 1135 Time Calculation (min): 60 minutes     Chief Complaint: Extremity Weakness    Visit #: 7    SUBJECTIVE: \"I'm doing exercises every day\"    OBJECTIVE:  Current objective measures:   Strength:   Upper extremity strength (left):     Shoulder flexion: 1    Shoulder extension:  2-    Shoulder abduction: 2-    Elbow flexion: 3-    Elbow extension: 3-    Forearm pronation: 2-    Forearm supination: 2-    Wrist flexion: 2-    Wrist extension: 3-    Fingers flexion: 3    Fingers extension: 2-     Strength Comments:  Left digits with 1+ edema proximal phalanges     Tone, Sensation and Coordination:   Tone:     Left upper extremity muscle tone: Spastic     Modified Deuce:   Upper extremity (left):     Wrist flexors: 1+    Finger flexors: 1+       Therapeutic Treatments and Modalities:    1. Neuromuscular Re-education (CPT 28579), 60 minutes    Therapeutic Treatments and Modalities Summary: Left hand retrograde massage for dorsal hand and digit edema with good results.  Passive sustained stretch left shoulder to digits, shoulder limited to 130 PROM r/t pain.   High speed vibration applied shoulder to dorsal forearm with focus on wrist/digit extension exercises separately and combined, with and without vibration multiple repetitions.  WB table top through forearm, elbow, and wrist to facilitate co-contraction and activation of scapular muscles.  Elbow flex/ext table top with foam in hand AROM followed by manual resistance at various angles for isometrics, repeated with shoulder isometrics for ff/abd/ER.  Isolated thumb extension x 10 reps        ASSESSMENT:   Response to treatment:  Pt " making very good progress today with an increase in volitional muscle activity in wrist/digit extensors, elbow flex/ext, as well as trace muscle activity in deltoids.  Pt responding well to NM re-ed via a combination of facilitory techniques and light isometrics.  Distal spasticity and edema persists.  HEP with good understanding.    PLAN/RECOMMENDATIONS:   Plan for treatment: therapy treatment to continue next visit.  Planned interventions for next visit: continue with current treatment, E-stim attended (CPT 42712), manual therapy (CPT 11770), neuromuscular re-education (CPT 93988), therapeutic activities (CPT 37155) and therapeutic exercise (CPT 96157)

## 2018-01-17 NOTE — TELEPHONE ENCOUNTER
Wife called and states Av needs a new referral to the Wound Care clinic on [address] please. Thank you.

## 2018-01-18 ENCOUNTER — APPOINTMENT (OUTPATIENT)
Dept: PHYSICAL THERAPY | Facility: REHABILITATION | Age: 73
End: 2018-01-18
Attending: FAMILY MEDICINE
Payer: MEDICARE

## 2018-01-19 ENCOUNTER — APPOINTMENT (OUTPATIENT)
Dept: RADIOLOGY | Facility: IMAGING CENTER | Age: 73
End: 2018-01-19
Attending: FAMILY MEDICINE
Payer: MEDICARE

## 2018-01-19 ENCOUNTER — HOSPITAL ENCOUNTER (OUTPATIENT)
Facility: MEDICAL CENTER | Age: 73
End: 2018-01-19
Attending: FAMILY MEDICINE
Payer: MEDICARE

## 2018-01-19 ENCOUNTER — OFFICE VISIT (OUTPATIENT)
Dept: URGENT CARE | Facility: CLINIC | Age: 73
End: 2018-01-19
Payer: MEDICARE

## 2018-01-19 VITALS
TEMPERATURE: 97.8 F | RESPIRATION RATE: 14 BRPM | SYSTOLIC BLOOD PRESSURE: 130 MMHG | HEIGHT: 72 IN | HEART RATE: 68 BPM | DIASTOLIC BLOOD PRESSURE: 74 MMHG | OXYGEN SATURATION: 97 % | BODY MASS INDEX: 25.73 KG/M2 | WEIGHT: 190 LBS

## 2018-01-19 DIAGNOSIS — R50.9 FEVER, UNSPECIFIED FEVER CAUSE: ICD-10-CM

## 2018-01-19 LAB
APPEARANCE UR: NORMAL
BILIRUB UR STRIP-MCNC: NORMAL MG/DL
COLOR UR AUTO: YELLOW
FLUAV+FLUBV AG SPEC QL IA: NEGATIVE
GLUCOSE UR STRIP.AUTO-MCNC: NORMAL MG/DL
INT CON NEG: NORMAL
INT CON POS: NORMAL
KETONES UR STRIP.AUTO-MCNC: NORMAL MG/DL
LEUKOCYTE ESTERASE UR QL STRIP.AUTO: NORMAL
NITRITE UR QL STRIP.AUTO: NORMAL
PH UR STRIP.AUTO: 6 [PH] (ref 5–8)
PROT UR QL STRIP: 100 MG/DL
RBC UR QL AUTO: NORMAL
SP GR UR STRIP.AUTO: 1.01
UROBILINOGEN UR STRIP-MCNC: NORMAL MG/DL

## 2018-01-19 PROCEDURE — 87077 CULTURE AEROBIC IDENTIFY: CPT

## 2018-01-19 PROCEDURE — 87086 URINE CULTURE/COLONY COUNT: CPT

## 2018-01-19 PROCEDURE — 71046 X-RAY EXAM CHEST 2 VIEWS: CPT | Mod: TC,FY | Performed by: FAMILY MEDICINE

## 2018-01-19 PROCEDURE — 87804 INFLUENZA ASSAY W/OPTIC: CPT | Performed by: FAMILY MEDICINE

## 2018-01-19 PROCEDURE — 87186 SC STD MICRODIL/AGAR DIL: CPT

## 2018-01-19 PROCEDURE — 99214 OFFICE O/P EST MOD 30 MIN: CPT | Performed by: FAMILY MEDICINE

## 2018-01-19 PROCEDURE — 81002 URINALYSIS NONAUTO W/O SCOPE: CPT | Performed by: FAMILY MEDICINE

## 2018-01-19 RX ORDER — CEFTRIAXONE 1 G/1
1 INJECTION, POWDER, FOR SOLUTION INTRAMUSCULAR; INTRAVENOUS ONCE
Status: COMPLETED | OUTPATIENT
Start: 2018-01-19 | End: 2018-01-19

## 2018-01-19 RX ORDER — SULFAMETHOXAZOLE AND TRIMETHOPRIM 800; 160 MG/1; MG/1
1 TABLET ORAL EVERY 12 HOURS
Qty: 14 TAB | Refills: 0 | Status: SHIPPED | OUTPATIENT
Start: 2018-01-19 | End: 2018-01-19

## 2018-01-19 RX ORDER — AMOXICILLIN AND CLAVULANATE POTASSIUM 875; 125 MG/1; MG/1
1 TABLET, FILM COATED ORAL 2 TIMES DAILY
Qty: 14 TAB | Refills: 0 | Status: SHIPPED | OUTPATIENT
Start: 2018-01-19 | End: 2018-01-26

## 2018-01-19 RX ADMIN — CEFTRIAXONE 1 G: 1 INJECTION, POWDER, FOR SOLUTION INTRAMUSCULAR; INTRAVENOUS at 13:29

## 2018-01-19 ASSESSMENT — ENCOUNTER SYMPTOMS
FEVER: 0
CHILLS: 0
SHORTNESS OF BREATH: 0
ORTHOPNEA: 0
HEMOPTYSIS: 0
FOCAL WEAKNESS: 0
DIZZINESS: 0

## 2018-01-19 NOTE — PROGRESS NOTES
Subjective:      Av Bob is a 72 y.o. male who presents with Fever and Vomiting    Chief Complaint   Patient presents with   • Fever   • Vomiting        - This is a very pleasant 72 y.o. male with complaints of 2 days w/ fever Tm101 and body aches w/ malaise. A little runny nose.           ALLERGIES:  Diphenhydramine hcl; Lorazepam; Ciprofloxacin; and Nkda [no known drug allergy]     PMH:  Past Medical History:   Diagnosis Date   • Acute respiratory failure with hypoxia (CMS-HCC) 5/20/2017   • CAD (coronary artery disease)     GIOVANNA to RCA in '97, GIOVANNA X2 to LAD and GIOVANNA X2 to OM in '09   • Cerebrovascular accident (CVA) (CMS-HCC) 12/30/2016    Left arm weakness  etiology of stroke not established, lymphoma discovered on MRI evaluation of stroke, L hemiparesis much worse after acute infectious illness in mid 2017, but no specific diagnosed recurrent neurological etiology, all at [facility]   • CKD (chronic kidney disease) stage 3, GFR 30-59 ml/min    • Controlled gout 2014   • Coronary atherosclerosis of native coronary artery     S/P PTCA (percutaneous transluminal coronary angioplasty), RCA, 5/1997, patent on cath 7/10/2009 at the time of interventions on his left anterior descending and circumflex coronary arteries   • Diabetes (CMS-HCC)    • Enterococcal septicemia (CMS-HCC) 8/12/2017   • Hypertension    • Hypokalemia 2012    controlled with combination of ACE inhibitor or ARB plus spironolactone   • Hypomagnesemia 08/12/2017    etiology uncertain   • 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 Barry   • NSTEMI (non-ST elevated myocardial infarction) (CMS-HCC) 07/18/2017    complicating UTI with sepsis   • Polyneuropathy in diabetes(357.2) 9/11/2013   • Septic shock (CMS-HCC) 5/20/2017   • Skin ulcer of calf (CMS-HCC) 2015    Right, Dr. Terry and wound care   • Wound of  left leg 2012    Requiring surgery and debridment, Dr. Oscar SOLERS:    Current Outpatient Prescriptions:   •  sulfamethoxazole-trimethoprim (BACTRIM DS) 800-160 MG tablet, Take 1 Tab by mouth every 12 hours for 7 days., Disp: 14 Tab, Rfl: 0  •  Insulin Glargine (TOUJEO SOLOSTAR) 300 UNIT/ML Solution Pen-injector, Inject 15 Units as instructed every evening., Disp: 3 PEN, Rfl: 6  •  fluoxetine (PROZAC) 20 MG Cap, Take 2 Caps by mouth every day., Disp: 90 Cap, Rfl: 0  •  magnesium oxide (MAG-OX) 400 MG Tab, Take 400 mg by mouth 3 times a day., Disp: , Rfl:   •  vitamin D (CHOLECALCIFEROL) 1000 UNIT Tab, Take 2,000 Units by mouth every day., Disp: , Rfl:   •  saccharomyces boulardii (FLORASTOR) 250 MG Cap, Take 250 mg by mouth 2 Times a Day., Disp: , Rfl:   •  metoprolol (TOPROL-XL) 200 MG XL tablet, Take 1 Tab by mouth every day., Disp: 90 Tab, Rfl: 3  •  Melatonin 5 MG Tab, Take 1-2 Tabs by mouth at bedtime as needed., Disp: , Rfl:   •  fenofibrate (TRICOR) 145 MG Tab, Take 1 Tab by mouth every day., Disp: 90 Tab, Rfl: 3  •  tramadol (ULTRAM) 50 MG Tab, Take 50 mg by mouth every four hours as needed., Disp: , Rfl:   •  losartan (COZAAR) 50 MG Tab, Take 1 Tab by mouth every day., Disp: 30 Tab, Rfl: 6  •  spironolactone (ALDACTONE) 50 MG Tab, Take 1 Tab by mouth every day., Disp: 90 Tab, Rfl: 3  •  loperamide (IMODIUM A-D) 2 MG Cap, Take 2 mg by mouth 4 times a day as needed for Diarrhea., Disp: , Rfl:   •  aspirin 81 MG tablet, Take 81 mg by mouth every day. ON HOLD, Disp: , Rfl:   •  Acetaminophen 325 MG Cap, Take 650 mg by mouth 4 times a day as needed (Pain)., Disp: , Rfl:   •  insulin lispro, Human, (HUMALOG) 100 UNIT/ML Solution Pen-injector injection, Using up to 50 units per day as directed., Disp: 45 mL, Rfl: 2  •  nystatin (MYCOSTATIN) Powder, Apply 1 Dose to affected area(s) 3 times a day as needed. groin rash, Disp: , Rfl:   •  ascorbic acid (VITAMIN C) 500 MG/5ML syrup, Take 500 mg by mouth every  day., Disp: , Rfl:   •  atorvastatin (LIPITOR) 40 MG Tab, Take 0.5 Tabs by mouth every day., Disp: 90 Tab, Rfl: 3  •  clopidogrel (PLAVIX) 75 MG Tab, Take 1 Tab by mouth every day. (Patient taking differently: Take 75 mg by mouth every day. ON HOLD), Disp: 90 Tab, Rfl: 3  •  allopurinol (ZYLOPRIM) 300 MG Tab, Take 1 Tab by mouth every day., Disp: 90 Tab, Rfl: 3  •  nitroglycerin (NITROSTAT) 0.4 MG SL Tab, Place 1 Tab under tongue as needed for Chest Pain., Disp: 25 Tab, Rfl: 6  •  B-D UF III MINI PEN NEEDLES 31G X 5 MM Misc, USE AS DIRECTED WITH INSULIN INJECTIONS, Disp: 450 Each, Rfl: 2  •  CENTRUM SILVER PO, Take 1 Tab by mouth every day., Disp: , Rfl:     Current Facility-Administered Medications:   •  cefTRIAXone (ROCEPHIN) injection 1 g, 1 g, Intramuscular, Once, Fred Souza M.D.    ** I have documented what I find to be significant in regards to past medical, social, family and surgical history  in my HPI or under PMH/PSH/FH review section, otherwise it is contributory **           HPI    Review of Systems   Constitutional: Negative for chills and fever.   Respiratory: Negative for hemoptysis and shortness of breath.    Cardiovascular: Negative for chest pain and orthopnea.   Neurological: Negative for dizziness and focal weakness.          Objective:     /74   Pulse 68   Temp 36.6 °C (97.8 °F)   Resp 14   Ht 1.829 m (6')   Wt 86.2 kg (190 lb)   SpO2 97%   BMI 25.77 kg/m²      Physical Exam   Constitutional: He appears well-developed. No distress.   HENT:   Head: Normocephalic and atraumatic.   Mouth/Throat: Oropharynx is clear and moist.   Eyes: Conjunctivae are normal.   Neck: Neck supple.   Cardiovascular: Regular rhythm.    No murmur heard.  Pulmonary/Chest: Effort normal. No respiratory distress.   Neurological: He is alert. He exhibits normal muscle tone.   Skin: Skin is warm and dry.   Psychiatric: He has a normal mood and affect. Judgment normal.   Nursing note and vitals  reviewed.              Assessment/Plan:         1. Fever, unspecified fever cause  POCT Influenza A/B    DX-CHEST-2 VIEWS    POCT Urinalysis    URINE CULTURE(NEW)    cefTRIAXone (ROCEPHIN) injection 1 g    sulfamethoxazole-trimethoprim (BACTRIM DS) 800-160 MG tablet             Dx & d/c instructions discussed w/ patient and/or family members. Follow up w/ Prvt Dr or here in 3-4 days if not getting better, sooner if needed,  ER if worse and UC/PCP unavailable.        Possible side effects (i.e. Rash, GI upset/constipation, sedation, elevation of BP or sugars) of any medications given discussed.

## 2018-01-20 DIAGNOSIS — R50.9 FEVER, UNSPECIFIED FEVER CAUSE: ICD-10-CM

## 2018-01-21 DIAGNOSIS — Z12.11 COLON CANCER SCREENING: ICD-10-CM

## 2018-01-21 LAB — HEMOCCULT STL QL IA: NEGATIVE

## 2018-01-22 ENCOUNTER — HOSPITAL ENCOUNTER (OUTPATIENT)
Dept: LAB | Facility: MEDICAL CENTER | Age: 73
End: 2018-01-22
Attending: FAMILY MEDICINE
Payer: MEDICARE

## 2018-01-22 ENCOUNTER — TELEPHONE (OUTPATIENT)
Dept: URGENT CARE | Facility: CLINIC | Age: 73
End: 2018-01-22

## 2018-01-22 DIAGNOSIS — E83.42 HYPOMAGNESEMIA: ICD-10-CM

## 2018-01-22 DIAGNOSIS — N18.30 CKD (CHRONIC KIDNEY DISEASE) STAGE 3, GFR 30-59 ML/MIN (HCC): ICD-10-CM

## 2018-01-22 LAB
ANION GAP SERPL CALC-SCNC: 7 MMOL/L (ref 0–11.9)
BACTERIA UR CULT: ABNORMAL
BACTERIA UR CULT: ABNORMAL
BUN SERPL-MCNC: 33 MG/DL (ref 8–22)
CALCIUM SERPL-MCNC: 9.3 MG/DL (ref 8.5–10.5)
CHLORIDE SERPL-SCNC: 102 MMOL/L (ref 96–112)
CO2 SERPL-SCNC: 27 MMOL/L (ref 20–33)
CREAT SERPL-MCNC: 1.62 MG/DL (ref 0.5–1.4)
GLUCOSE SERPL-MCNC: 163 MG/DL (ref 65–99)
MAGNESIUM SERPL-MCNC: 1.5 MG/DL (ref 1.5–2.5)
POTASSIUM SERPL-SCNC: 4.1 MMOL/L (ref 3.6–5.5)
SIGNIFICANT IND 70042: ABNORMAL
SITE SITE: ABNORMAL
SODIUM SERPL-SCNC: 136 MMOL/L (ref 135–145)
SOURCE SOURCE: ABNORMAL

## 2018-01-22 PROCEDURE — 83735 ASSAY OF MAGNESIUM: CPT

## 2018-01-22 PROCEDURE — 36415 COLL VENOUS BLD VENIPUNCTURE: CPT

## 2018-01-22 PROCEDURE — 80048 BASIC METABOLIC PNL TOTAL CA: CPT

## 2018-01-22 NOTE — TELEPHONE ENCOUNTER
Call and see how patient is responding to Augmentin Rx for his UTI? The culture showed some possible resistance. If he is not feeling better I will need to send over a new Abx for him.  Kindly let me know.

## 2018-01-23 ENCOUNTER — TELEPHONE (OUTPATIENT)
Dept: MEDICAL GROUP | Facility: MEDICAL CENTER | Age: 73
End: 2018-01-23

## 2018-01-23 NOTE — TELEPHONE ENCOUNTER
ESTABLISHED PATIENT PRE-VISIT PLANNING     Note: Patient will not be contacted if there is no indication to call.     1.  Reviewed notes from the last few office visits within the medical group: Yes    2.  If any orders were placed at last visit or intended to be done for this visit (i.e. 6 mos follow-up), do we have Results/Consult Notes?        •  Labs - Labs ordered, completed on 01/22/2017 and results are in chart.   Note: If patient appointment is for lab review and patient did not complete labs, check with provider if OK to reschedule patient until labs completed.       •  Imaging - Imaging ordered, completed and results are in chart.       •  Referrals - No referrals were ordered at last office visit.    3. Is this appointment scheduled as a Hospital Follow-Up? No    4.  Immunizations were updated in [employer] using Web[employer]?: Yes       •  Web [employer] Recommendations: FLU, PREVNAR (PCV13)  and TD    5.  Patient is due for the following Health Maintenance Topics:   Health Maintenance Due   Topic Date Due   • IMM PNEUMOCOCCAL 65+ (ADULT) HIGH/HIGHEST RISK SERIES (1 of 2 - PCV13) 03/30/2012   • Annual Wellness Visit  08/19/2016   • IMM INFLUENZA (1) 09/01/2017           6.  Patient was NOT informed to arrive 15 min prior to their scheduled appointment and bring in their medication bottles.

## 2018-01-24 ENCOUNTER — NON-PROVIDER VISIT (OUTPATIENT)
Dept: WOUND CARE | Facility: MEDICAL CENTER | Age: 73
End: 2018-01-24
Attending: INTERNAL MEDICINE
Payer: MEDICARE

## 2018-01-24 ENCOUNTER — OFFICE VISIT (OUTPATIENT)
Dept: MEDICAL GROUP | Facility: MEDICAL CENTER | Age: 73
End: 2018-01-24
Payer: MEDICARE

## 2018-01-24 VITALS
SYSTOLIC BLOOD PRESSURE: 126 MMHG | BODY MASS INDEX: 25.73 KG/M2 | TEMPERATURE: 98.2 F | HEART RATE: 60 BPM | OXYGEN SATURATION: 99 % | RESPIRATION RATE: 14 BRPM | WEIGHT: 190 LBS | HEIGHT: 72 IN | DIASTOLIC BLOOD PRESSURE: 84 MMHG

## 2018-01-24 DIAGNOSIS — N17.9 ACUTE KIDNEY INJURY (HCC): ICD-10-CM

## 2018-01-24 DIAGNOSIS — S81.809S WOUND, OPEN, KNEE, LOWER LEG, OR ANKLE WITH COMPLICATION, UNSPECIFIED LATERALITY, SEQUELA: ICD-10-CM

## 2018-01-24 DIAGNOSIS — N39.0 ACUTE UTI: ICD-10-CM

## 2018-01-24 DIAGNOSIS — B35.1 DERMATOPHYTOSIS OF NAIL: Primary | ICD-10-CM

## 2018-01-24 DIAGNOSIS — S91.009S WOUND, OPEN, KNEE, LOWER LEG, OR ANKLE WITH COMPLICATION, UNSPECIFIED LATERALITY, SEQUELA: ICD-10-CM

## 2018-01-24 DIAGNOSIS — N18.30 CKD (CHRONIC KIDNEY DISEASE) STAGE 3, GFR 30-59 ML/MIN (HCC): ICD-10-CM

## 2018-01-24 DIAGNOSIS — S81.009S WOUND, OPEN, KNEE, LOWER LEG, OR ANKLE WITH COMPLICATION, UNSPECIFIED LATERALITY, SEQUELA: ICD-10-CM

## 2018-01-24 DIAGNOSIS — F51.04 PSYCHOPHYSIOLOGICAL INSOMNIA: ICD-10-CM

## 2018-01-24 DIAGNOSIS — Z99.3 WHEELCHAIR DEPENDENT: ICD-10-CM

## 2018-01-24 DIAGNOSIS — N20.1 OBSTRUCTION OF LEFT URETEROPELVIC JUNCTION DUE TO STONE: ICD-10-CM

## 2018-01-24 PROCEDURE — 99213 OFFICE O/P EST LOW 20 MIN: CPT

## 2018-01-24 PROCEDURE — 99214 OFFICE O/P EST MOD 30 MIN: CPT | Performed by: FAMILY MEDICINE

## 2018-01-24 NOTE — CERTIFICATION
Advanced Wound Care  [facility]  [address]  [address]  [place] [address]  [phone] Fax: [phone]      Initial Evaluation  For Certification Period:01/24/2018 - 04/16/2018      Referring Physician: Trinidad Maguire  Primary Physician:     Dr. Mitchell Pantoja MD   Consulting Physicians:         Wound(s):S91.009s R lateral malleolus, L lateral foot - dry eschar/scab areas. Scab on L dorsal 2nd toe  Start of Care: 01/24/2018       Subjective:        HPI: Pt is a 72 year old diabetic gentleman with a hx of cardiovascular disease, past CVA with L hemiparesis who is referred by PCP for eval of necrotic areas on R lateral malleolus and L lateral foot. He also has scabbed areas on L dorsal 2nd toe and several small dry scabs on LLE. Pt is wc bound, transfers with assist.  He has PN and LOPS dez feet.              Pain: pt denies pain            Past Medical History:  Past Medical History:   Diagnosis Date   • Acute respiratory failure with hypoxia (CMS-HCC) 5/20/2017   • CAD (coronary artery disease)     GIOVANNA to RCA in '97, GIOVANNA X2 to LAD and GIOVANNA X2 to OM in '09   • Cerebrovascular accident (CVA) (CMS-HCC) 12/30/2016    Left arm weakness  etiology of stroke not established, lymphoma discovered on MRI evaluation of stroke, L hemiparesis much worse after acute infectious illness in mid 2017, but no specific diagnosed recurrent neurological etiology, all at [facility]   • CKD (chronic kidney disease) stage 3, GFR 30-59 ml/min    • Controlled gout 2014   • Coronary atherosclerosis of native coronary artery     S/P PTCA (percutaneous transluminal coronary angioplasty), RCA, 5/1997, patent on cath 7/10/2009 at the time of interventions on his left anterior descending and circumflex coronary arteries   • Diabetes (CMS-HCC)    • Enterococcal septicemia (CMS-HCC) 8/12/2017   • Hypertension    • Hypokalemia 2012    controlled with combination of ACE inhibitor or ARB plus  spironolactone   • Hypomagnesemia 08/12/2017    etiology uncertain   • 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 Barry   • NSTEMI (non-ST elevated myocardial infarction) (CMS-HCC) 07/18/2017    complicating UTI with sepsis   • Polyneuropathy in diabetes(357.2) 9/11/2013   • Septic shock (CMS-HCC) 5/20/2017   • Skin ulcer of calf (CMS-HCC) 2015    Right, Dr. Terry and wound care   • Wound of left leg 2012    Requiring surgery and debridment, Dr. Moore     Current Medications  Current Outpatient Prescriptions:   •  amoxicillin-clavulanate (AUGMENTIN) 875-125 MG Tab, Take 1 Tab by mouth 2 times a day for 7 days., Disp: 14 Tab, Rfl: 0  •  Insulin Glargine (TOUJEO SOLOSTAR) 300 UNIT/ML Solution Pen-injector, Inject 15 Units as instructed every evening., Disp: 3 PEN, Rfl: 6  •  fluoxetine (PROZAC) 20 MG Cap, Take 2 Caps by mouth every day., Disp: 90 Cap, Rfl: 0  •  magnesium oxide (MAG-OX) 400 MG Tab, Take 400 mg by mouth 3 times a day., Disp: , Rfl:   •  vitamin D (CHOLECALCIFEROL) 1000 UNIT Tab, Take 2,000 Units by mouth every day., Disp: , Rfl:   •  saccharomyces boulardii (FLORASTOR) 250 MG Cap, Take 250 mg by mouth 2 Times a Day., Disp: , Rfl:   •  metoprolol (TOPROL-XL) 200 MG XL tablet, Take 1 Tab by mouth every day., Disp: 90 Tab, Rfl: 3  •  Melatonin 5 MG Tab, Take 1-2 Tabs by mouth at bedtime as needed., Disp: , Rfl:   •  fenofibrate (TRICOR) 145 MG Tab, Take 1 Tab by mouth every day., Disp: 90 Tab, Rfl: 3  •  tramadol (ULTRAM) 50 MG Tab, Take 50 mg by mouth every four hours as needed., Disp: , Rfl:   •  losartan (COZAAR) 50 MG Tab, Take 1 Tab by mouth every day., Disp: 30 Tab, Rfl: 6  •  spironolactone (ALDACTONE) 50 MG Tab, Take 1 Tab by mouth every day., Disp: 90 Tab, Rfl: 3  •  loperamide (IMODIUM A-D) 2 MG Cap, Take 2 mg by mouth 4 times a day as needed for Diarrhea., Disp: , Rfl:   •  aspirin 81 MG tablet,  Take 81 mg by mouth every day. ON HOLD, Disp: , Rfl:   •  Acetaminophen 325 MG Cap, Take 650 mg by mouth 4 times a day as needed (Pain)., Disp: , Rfl:   •  insulin lispro, Human, (HUMALOG) 100 UNIT/ML Solution Pen-injector injection, Using up to 50 units per day as directed., Disp: 45 mL, Rfl: 2  •  nystatin (MYCOSTATIN) Powder, Apply 1 Dose to affected area(s) 3 times a day as needed. groin rash, Disp: , Rfl:   •  ascorbic acid (VITAMIN C) 500 MG/5ML syrup, Take 500 mg by mouth every day., Disp: , Rfl:   •  atorvastatin (LIPITOR) 40 MG Tab, Take 0.5 Tabs by mouth every day., Disp: 90 Tab, Rfl: 3  •  clopidogrel (PLAVIX) 75 MG Tab, Take 1 Tab by mouth every day. (Patient taking differently: Take 75 mg by mouth every day. ON HOLD), Disp: 90 Tab, Rfl: 3  •  allopurinol (ZYLOPRIM) 300 MG Tab, Take 1 Tab by mouth every day., Disp: 90 Tab, Rfl: 3  •  nitroglycerin (NITROSTAT) 0.4 MG SL Tab, Place 1 Tab under tongue as needed for Chest Pain., Disp: 25 Tab, Rfl: 6  •  B-D UF III MINI PEN NEEDLES 31G X 5 MM Misc, USE AS DIRECTED WITH INSULIN INJECTIONS, Disp: 450 Each, Rfl: 2  •  CENTRUM SILVER PO, Take 1 Tab by mouth every day., Disp: , Rfl:   Allergies: Diphenhydramine hcl; Lorazepam; Ciprofloxacin; and Nkda [no known drug allergy]    Past Surgical History:   Past Surgical History:   Procedure Laterality Date   • WOUND CLOSURE GENERAL  4/3/2012    Performed by GERHARD GILBERT at SURGERY SAME DAY [facility] ORS   • ANGIOPLASTY  1997    RCA followed by other stents as noted above.    • CARDIAC CATH     • CATARACT EXTRACTION WITH IOL      bilateral   • LITHOTRIPSY     • TONSILLECTOMY AND ADENOIDECTOMY       Social History:   Social History     Social History   • Marital status:      Spouse name: N/A   • Number of children: N/A   • Years of education: N/A     Occupational History   • Not on file.     Social History Main Topics   • Smoking status: Never Smoker   • Smokeless tobacco: Never Used   • Alcohol use No   •  Drug use: No   • Sexual activity: Not Currently     Partners: Female     Other Topics Concern   • Not on file     Social History Narrative   • No narrative on file            Objective:      Tests and Measures: Last A1c 01/15/2018 - 7.6 per EPIC. FBS today 177, yesterday 138 per spouse. Pt states it is usually <150.   Monofilament test reveals R - 1/10 sites sensate, L - 0/10 sites sensate - LOPS dez.   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 Arterial wound healing studies ordered BLE. PAR asked to schedule pt with Dr. Terry for vascular consult.    Orthotic, protective, supportive devices: pt is wc bound secondary to L hemiparisis from CVA    Fall Risk Assessment (naomy all that apply with an X):             X   65 years or older                     Fall within the last 2 years, uses   X   Ambulatory devices   X   Loss of protective sensation in feet,          Use of prostethic/orthotic, years                     Presence of lower extremity/foot/toe amputation                   Taking medication that increases risk (per facility policy)  Verbal and written fall prevention info given. Pt is wc bound and transfers with assist.     Wound Characteristics                                                    Location:R lateral malleolus   Initial Evaluation  Date:01/24/2018   Tissue Type and %: 100% dry black scab/eschar   Periwound: intact   Drainage: none   Exposed structures none   Wound Edges:   Closed with scab/eschar   Odor: none   S&S of Infection:   none   Edema: none   Sensation: PN - LOPS               Measurements: Initial Evaluation  Date:01/24/2018   Length (cm) 0.9   Width (cm) 0.9   Depth (cm) na   Area  (cm2) 0.81cm2   Tract/undermine na      Wound Characteristics                                                    Location:L lateral foot   Initial Evaluation  Date:01/24/2018   Tissue Type and %: 100% brown scab/eschar   Periwound: intact   Drainage: none   Exposed structures none   Wound Edges:   Closed with scab/eschar   Odor: none   S&S of Infection:   none   Edema: none   Sensation: PN - LOPS               Measurements: Initial Evaluation  Date:01/24/2018   Length (cm) 0.4   Width (cm) 0.4   Depth (cm) na   Area (cm2) 0.16cm2   Tract/undermine na        Procedures:     Debridement :  none   Cleansed with:   na                                                                       Periwound protected with:skin prep   Primary dressing:DSD to R lateral ankle and L lateral foot   Secondary Dressing:Komprex foam horseshoes secured with hypafix   Other:      Patient Education: pt instr increased protein diet, use of MVI if ok with MD; s/s infection, increased erythema and edema/fever/chills/N+V when to call MD/go to ER. Instr rational for wound care products. Instr to make appts for 2 x week. Instr to keep dressings clean and dry, shower on clinic days right before coming in, wrap and bag leg first to keep dry.  Instr pt to call and schedule arterial studies asap.  Instr foot and nail referral has been made. Pt and cg with good understanding.    Professional Collaboration: With [facility] for Rx for [employer] for diabetic shoes and orthoses; arterial wound healing studies ordered BLE, foot and nail referral made      Assessment:      Wound etiology: DFU, possible arterial disease    Wound Progress:  Initial eval - TBD    Rationale for Treatment: DSD to keep dry until arterial status can be determined; Komprex horseshoes to relieve pressure from areas.    Patient tolerance/compliance: Pt tolerated care well. Appears receptive to instr, and motivated to heal    Complicating factors:DM, possible PVD    Need for  ongoing Advanced Wound Care services:continued skilled wound care for debridement as needed, dressing management and skilled clinical observation to prevent complications and expedite healing.        Plan:      Treatment Plan and Recommendations: keep areas clean and dry until arterial status has been determined. Pt to get arterial studies asap  Diagnosis/ICD9: S91.009s R lateral malleolus, L lateral foot - dry eschar/scab areas. Scab on L dorsal 2nd toe    Procedures/CPT:level 3 assessment     Frequency: 2 xweek      Treatment Goals: STG 2 Weeks  LTG 4 Weeks   Granulation Tissue: % %   Decrease Necrotic Tissue to: % %   Wound Phase:      Decrease Size by: % %   Periwound:      Decrease tracts/undermining by: % %   Decrease Pain:          At the time of each visit a thorough assessment of the patient is completed to assure the  appropriateness of our plan of care.  The dressings or modalities may need to be adapted   from the original plan to address any significant changes in the wound environment.          Clinician Signature:_______________________________Date__________________      Physician Signature:______________________________Date:__________________

## 2018-01-25 ENCOUNTER — PHYSICAL THERAPY (OUTPATIENT)
Dept: PHYSICAL THERAPY | Facility: REHABILITATION | Age: 73
End: 2018-01-25
Attending: FAMILY MEDICINE
Payer: MEDICARE

## 2018-01-25 DIAGNOSIS — Z99.3 WHEELCHAIR DEPENDENT: ICD-10-CM

## 2018-01-25 PROCEDURE — 97112 NEUROMUSCULAR REEDUCATION: CPT

## 2018-01-25 PROCEDURE — 97110 THERAPEUTIC EXERCISES: CPT

## 2018-01-25 PROCEDURE — 97116 GAIT TRAINING THERAPY: CPT

## 2018-01-25 NOTE — OP THERAPY DAILY TREATMENT
Outpatient Physical Therapy  DAILY TREATMENT     [facility] [address]  [address]  [place] [address]  Phone:  [phone]  Fax:  [phone]    Date: 01/25/2018    Patient: Av Bob  YOB: 1945  MRN: [id number]     Time Calculation  Start time: 1315  Stop time: 1420 Time Calculation (min): 65 minutes     Chief Complaint: No chief complaint on file.    Visit #: 14    SUBJECTIVE: UTI for 3 days last week; just finishing antibiotics.  Bilateral lateral malleolar diabetic ulcers, increase neuropathy with decreased sensation bilateral LE      OBJECTIVE:  Current objective measures:          Therapeutic Exercises (CPT 20827):     Total treatment time spent on Therapeutic Exercises: 20 minutes.      1. Sit to stand w/c to fww min assist, 1 x 10    2. Nu step L2, 15 min    Therapeutic Treatments and Modalities:     1. Neuromuscular Re-education (CPT 74413), 20 minutes, standing static at edge of raised mat   @ CGA assist.  2 x 2 min with no UE support, ; ball roll fwd backward and side to side    2. Gait Training (CPT 77579), 25 min minutes, with fww/left forearm trough and  1 x 215 feet, 1 x 150 feet,@[id number]       Pain rating before treatment: 0  Pain rating after treatment: 0    ASSESSMENT:   Response to treatment:  Patient panicked and was unable to problem solve when walker got stuck behind nu step; requiring mod assist to avoid fall and to reposition walker.  Patient fatigued but demonstrated great motivation/participation.       PLAN/RECOMMENDATIONS:   Plan for treatment: therapy treatment to continue next visit.  Planned interventions for next visit: gait training (CPT 12926), neuromuscular re-education (CPT 23051), therapeutic activities (CPT 39514) and therapeutic exercise (CPT 44851).

## 2018-01-26 ENCOUNTER — OFFICE VISIT (OUTPATIENT)
Dept: BEHAVIORAL HEALTH | Facility: PHYSICIAN GROUP | Age: 73
End: 2018-01-26
Payer: MEDICARE

## 2018-01-26 DIAGNOSIS — F43.21 ADJUSTMENT DISORDER WITH DEPRESSED MOOD: ICD-10-CM

## 2018-01-26 DIAGNOSIS — F32.A DEPRESSION, UNSPECIFIED DEPRESSION TYPE: ICD-10-CM

## 2018-01-26 PROCEDURE — 90792 PSYCH DIAG EVAL W/MED SRVCS: CPT | Performed by: NURSE PRACTITIONER

## 2018-01-26 PROCEDURE — 90834 PSYTX W PT 45 MINUTES: CPT | Performed by: PSYCHOLOGIST

## 2018-01-26 NOTE — ASSESSMENT & PLAN NOTE
Acute exacerbation of chronic condition, please see notes from same date of service 1/24/2018 regarding acute kidney injury.

## 2018-01-26 NOTE — ASSESSMENT & PLAN NOTE
Chronic, uncontrolled, but improving.  Patient continues to participate with outpatient PT, and is progressing in terms of strength and stability over time.  Left side is still weak, overall.

## 2018-01-26 NOTE — PROGRESS NOTES
Subjective:   Chief Complaint/History of Present Illness:  Av Bob is a 72 y.o. male established patient who presents today to discuss the following medical conditions:    Acute kidney injury (CMS-[facility])  Patient I reviewed his most recent labs from 1/22/2018 which do reveal acute kidney injury although magnesium is within normal limits. Patient denies taking any nephrotoxic medications at present time, is still continuing off of his metformin as her previously directed, as well as not having taken any NSAIDs.    To review, patient still has persistence of urolithiasis in his kidneys, which may be explaining the acute kidney injury secondary to obstructive effects, and this will be addressed by lithotripsy within next few weeks by his urologist.    Additionally, patient does have baseline chronic kidney disease stage III.    Lastly, patient was recently seen in the [facility] urgent care, and review of records reveals that he was found to have a urinary tract infection which is being treated appropriately with antibiotics.    ROS is NEGATIVE for fevers, chills, rigors, flank pain, dysuria, hematuria, pyuria, polyuria, increased frequency of urination, diarrhea, constipation.    CKD (chronic kidney disease) stage 3, GFR 30-59 ml/min  Acute exacerbation of chronic condition, please see notes from same date of service 1/24/2018 regarding acute kidney injury.    Obstruction of left ureteropelvic junction due to stone  Acute exacerbation of chronic condition, please see notes from same date of service 1/24/2018 regarding acute kidney injury.    Acute UTI  Acute problem, uncontrolled, but improving, please see notes from same date of service 1/24/2018 regarding acute kidney injury.    Psychophysiological insomnia  Chronic, uncontrolled, however improving. Patient has made good changes to his sleep hygiene, cutting down on his TV and before bedtime. Patient is taking melatonin either 5 or 10 mg by mouth daily at  bedtime when necessary.    ROS is NEGATIVE for generalized weakness/fatigue, headaches upon awakening, daytime hypersomnolence, dyspnea, tachypnea, respiratory distress, cyanotic skin changes.    Wheelchair dependent  Chronic, uncontrolled, but improving.  Patient continues to participate with outpatient PT, and is progressing in terms of strength and stability over time.  Left side is still weak, overall.      Patient Active Problem List    Diagnosis Date Noted   • Paroxysmal atrial fibrillation (CMS-HCC) 05/23/2017     Priority: Medium   • Essential hypertension, benign 03/22/2017     Priority: Medium   • 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, without long-term current use of insulin (CMS-HCC) 12/30/2016     Priority: Medium   • Atherosclerosis of native coronary artery of native heart      Priority: Medium   • Mixed hyperlipidemia 12/13/2011     Priority: Medium   • Benign hypertensive heart disease without heart failure 12/13/2011     Priority: Medium   • Left hemiparesis (CMS-HCC) 12/27/2017     Priority: Low   • Diabetic nephropathy associated with type 2 diabetes mellitus (CMS-HCC) 11/30/2017     Priority: Low   • Psychophysiological insomnia 11/21/2017     Priority: Low   • Severe episode of recurrent major depressive disorder, without psychotic features (CMS-HCC) 11/07/2017     Priority: Low   • Wheelchair dependent 11/07/2017     Priority: Low   • Obstruction of left ureteropelvic junction due to stone 07/18/2017     Priority: Low   • Normocytic hypochromic anemia 05/20/2017     Priority: Low   • Diffuse large cell non-Hodgkin's lymphoma (CMS-HCC) 05/08/2017     Priority: Low   • CKD (chronic kidney disease) stage 3, GFR 30-59 ml/min 08/25/2016     Priority: Low   • Idiopathic chronic gout of multiple sites without tophus 01/28/2014     Priority: Low   • Diabetic polyneuropathy (CMS-HCC) 09/11/2013     Priority: Low   • Acute  kidney injury (CMS-HCC) 01/24/2018   • Acute UTI 01/24/2018   • Microalbuminuria due to type 2 diabetes mellitus (CMS-HCC) 01/11/2018       Additional History:   Allergies:    Diphenhydramine hcl; Lorazepam; Ciprofloxacin; and Nkda [no known drug allergy]     Current Medications:     Current Outpatient Prescriptions   Medication Sig Dispense Refill   • amoxicillin-clavulanate (AUGMENTIN) 875-125 MG Tab Take 1 Tab by mouth 2 times a day for 7 days. 14 Tab 0   • Insulin Glargine (TOUJEO SOLOSTAR) 300 UNIT/ML Solution Pen-injector Inject 15 Units as instructed every evening. 3 PEN 6   • fluoxetine (PROZAC) 20 MG Cap Take 2 Caps by mouth every day. 90 Cap 0   • magnesium oxide (MAG-OX) 400 MG Tab Take 400 mg by mouth 3 times a day.     • vitamin D (CHOLECALCIFEROL) 1000 UNIT Tab Take 2,000 Units by mouth every day.     • saccharomyces boulardii (FLORASTOR) 250 MG Cap Take 250 mg by mouth 2 Times a Day.     • metoprolol (TOPROL-XL) 200 MG XL tablet Take 1 Tab by mouth every day. 90 Tab 3   • Melatonin 5 MG Tab Take 1-2 Tabs by mouth at bedtime as needed.     • fenofibrate (TRICOR) 145 MG Tab Take 1 Tab by mouth every day. 90 Tab 3   • tramadol (ULTRAM) 50 MG Tab Take 50 mg by mouth every four hours as needed.     • losartan (COZAAR) 50 MG Tab Take 1 Tab by mouth every day. 30 Tab 6   • spironolactone (ALDACTONE) 50 MG Tab Take 1 Tab by mouth every day. 90 Tab 3   • loperamide (IMODIUM A-D) 2 MG Cap Take 2 mg by mouth 4 times a day as needed for Diarrhea.     • aspirin 81 MG tablet Take 81 mg by mouth every day. ON HOLD     • Acetaminophen 325 MG Cap Take 650 mg by mouth 4 times a day as needed (Pain).     • insulin lispro, Human, (HUMALOG) 100 UNIT/ML Solution Pen-injector injection Using up to 50 units per day as directed. 45 mL 2   • nystatin (MYCOSTATIN) Powder Apply 1 Dose to affected area(s) 3 times a day as needed. groin rash     • ascorbic acid (VITAMIN C) 500 MG/5ML syrup Take 500 mg by mouth every day.     •  atorvastatin (LIPITOR) 40 MG Tab Take 0.5 Tabs by mouth every day. 90 Tab 3   • clopidogrel (PLAVIX) 75 MG Tab Take 1 Tab by mouth every day. (Patient taking differently: Take 75 mg by mouth every day. ON HOLD) 90 Tab 3   • allopurinol (ZYLOPRIM) 300 MG Tab Take 1 Tab by mouth every day. 90 Tab 3   • nitroglycerin (NITROSTAT) 0.4 MG SL Tab Place 1 Tab under tongue as needed for Chest Pain. 25 Tab 6   • B-D UF III MINI PEN NEEDLES 31G X 5 MM Misc USE AS DIRECTED WITH INSULIN INJECTIONS 450 Each 2   • CENTRUM SILVER PO Take 1 Tab by mouth every day.       No current facility-administered medications for this visit.         Social History:     Social History   Substance Use Topics   • Smoking status: Never Smoker   • Smokeless tobacco: Never Used   • Alcohol use No       ROS:     - NOTE: All other systems reviewed and are negative, except as in HPI.     Objective:   Physical Exam:    Vitals: Blood pressure 126/84, pulse 60, temperature 36.8 °C (98.2 °F), resp. rate 14, height 1.829 m (6'), weight 86.2 kg (190 lb), SpO2 99 %.   BMI: Body mass index is 25.77 kg/m².   General/Constitutional: Vitals as above, Well nourished, well developed male in no acute distress   Head/Eyes: Head is grossly normal & atraumatic, bilateral conjunctivae clear and not injected, bilateral EOMI, bilateral PERRL   ENT: Bilateral external ears grossly normal in appearance, Hearing grossly intact, External nares normal in appearance and without discharge/bleeding   Respiratory: No respiratory distress, bilateral lungs are clear to ausculation in all lung fields (anterior/lateral/posterior), no wheezing/rhonchi/rales   Cardiovascular: Regular rate and rhythm without murmur/gallops/rubs, distal pulses are intact and equal bilaterally (radial, posterior tibial), no bilateral lower extremity edema   MSK: Patient wheelchair dependent at present time, has abnormal gait due to left-sided hemiplegia, no CVAT bilaterally   Integumentary: No apparent  faustino   Psych: Judgment grossly appropriate, no apparent depression/anxiety in better spirits than previous exams    Health Maintenance:     - Not addressed at this visit    Imaging/Labs:     - Not addressed at this visit    Assessment and Plan:   1. Acute kidney injury (CMS-HCC)  2. CKD (chronic kidney disease) stage 3, GFR 30-59 ml/min  3. Obstruction of left ureteropelvic junction due to stone  Acute exacerbation (CATA, uncontrolled) of chronic condition (CKD, uncontrolled) secondary to uncontrolled conditoin (#3) which will be addressed with lithotripsy by Urology and also likely secondary to acute UTI.  Patient to have recheck of BMP after lithotripsy as ordered by Endocrinologist, Dr. Maguire.    4. Acute UTI  Acute condition, new, uncontrolled, but resolving.  Patient to continue taking antibiotics as directed.    5. Psychophysiological insomnia  Chronic condition, uncontrolled, but improving.  Patient to continue working on sleep hygiene.    6. Wheelchair dependent  Chronic condition, uncontrolled, but improving.  Patient to continue working with physical therapy.        RTC: in 2 months for Medicare Annual Wellness Exam.    PLEASE NOTE: This dictation was created using voice recognition software. I have made every reasonable attempt to correct obvious errors, but I expect that there are errors of grammar and possibly content that I did not discover before finalizing the note.

## 2018-01-26 NOTE — ASSESSMENT & PLAN NOTE
Acute problem, uncontrolled, but improving, please see notes from same date of service 1/24/2018 regarding acute kidney injury.
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
## 2018-01-26 NOTE — ASSESSMENT & PLAN NOTE
Patient I reviewed his most recent labs from 1/22/2018 which do reveal acute kidney injury although magnesium is within normal limits. Patient denies taking any nephrotoxic medications at present time, is still continuing off of his metformin as her previously directed, as well as not having taken any NSAIDs.    To review, patient still has persistence of urolithiasis in his kidneys, which may be explaining the acute kidney injury secondary to obstructive effects, and this will be addressed by lithotripsy within next few weeks by his urologist.    Additionally, patient does have baseline chronic kidney disease stage III.    Lastly, patient was recently seen in the [facility] urgent care, and review of records reveals that he was found to have a urinary tract infection which is being treated appropriately with antibiotics.    ROS is NEGATIVE for fevers, chills, rigors, flank pain, dysuria, hematuria, pyuria, polyuria, increased frequency of urination, diarrhea, constipation.

## 2018-01-29 ENCOUNTER — TELEPHONE (OUTPATIENT)
Dept: URGENT CARE | Facility: CLINIC | Age: 73
End: 2018-01-29

## 2018-01-29 ENCOUNTER — NON-PROVIDER VISIT (OUTPATIENT)
Dept: WOUND CARE | Facility: MEDICAL CENTER | Age: 73
End: 2018-01-29
Attending: INTERNAL MEDICINE
Payer: MEDICARE

## 2018-01-29 DIAGNOSIS — Z95.5 PRESENCE OF DRUG COATED STENT IN LEFT CIRCUMFLEX CORONARY ARTERY: ICD-10-CM

## 2018-01-29 DIAGNOSIS — Z95.5 PRESENCE OF DRUG COATED STENT IN LAD CORONARY ARTERY: ICD-10-CM

## 2018-01-29 PROCEDURE — 99212 OFFICE O/P EST SF 10 MIN: CPT

## 2018-01-29 NOTE — TELEPHONE ENCOUNTER
LVM      Call and see how patient is responding to Augmentin Rx for his UTI? The culture showed some possible resistance. If he is not feeling better I will need to send over a new Abx for him.  Kindly let me know.

## 2018-01-29 NOTE — WOUND TEAM
Advanced Wound Care  [facility]  [address]  [address]  [place] [address]  [phone] Fax: [phone]      Initial Evaluation  For Certification Period:01/24/2018 - 04/16/2018      Referring Physician: Trinidad Maguire  Primary Physician:     Dr. Mitchell Pantoja MD   Consulting Physicians:         Wound(s):S91.009s R lateral malleolus, L lateral foot - dry eschar/scab areas. Scab on L dorsal 2nd toe  Start of Care: 01/24/2018       Subjective:        HPI: Pt is a 72 year old diabetic gentleman with a hx of cardiovascular disease, past CVA with L hemiparesis who is referred by PCP for eval of necrotic areas on R lateral malleolus and L lateral foot. He also has scabbed areas on L dorsal 2nd toe and several small dry scabs on LLE. Pt is wc bound, transfers with assist.  He has PN and LOPS dez feet.              Pain: pt denies pain            Past Medical History:    Current Medications  Allergies: Diphenhydramine hcl; Lorazepam; Ciprofloxacin; and Nkda [no known drug allergy]    Past Surgical History:     Social History:            Objective:      Tests and Measures: Last A1c 01/15/2018 - 7.6 per EPIC. FBS today 177, yesterday 138 per spouse. Pt states it is usually <150.   Monofilament test reveals R - 1/10 sites sensate, L - 0/10 sites sensate - LOPS dez.   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 Arterial wound healing studies ordered BLE. PAR asked to schedule pt with Dr. Terry for vascular consult.    Orthotic, protective, supportive devices: pt is wc bound secondary to L hemiparisis from CVA    Fall Risk Assessment (naomy all that apply with an X):             X   65 years or older                      Fall within the last 2 years, uses   X   Ambulatory devices   X   Loss of protective sensation in feet,          Use of prostethic/orthotic, years                     Presence of lower extremity/foot/toe amputation                   Taking medication that increases risk (per facility policy)  Verbal and written fall prevention info given. Pt is wc bound and transfers with assist.     Wound Characteristics                                                    Location:R lateral malleolus   Initial Evaluation  Date:01/24/2018 Encounter note:  Date: 1/29/2018   Tissue Type and %: 100% dry black scab/eschar 100% moist dark brown eschar   Periwound: intact Intact, erythema   Drainage: None none   Exposed structures None none   Wound Edges:   Closed with scab/eschar Open with top layer of eschar peeled off   Odor: None none   S&S of Infection:   None none   Edema: None none   Sensation: PN - LOPS Neuropathy- LOPS               Measurements: Initial Evaluation  Date:01/24/2018   Length (cm) 0.9   Width (cm) 0.9   Depth (cm) na   Area (cm2) 0.81cm2   Tract/undermine na          Wound Characteristics                                                    Location:L lateral foot   Initial Evaluation  Date:01/24/2018 Encounter note:  Date: 1/29/2018   Tissue Type and %: 100% brown scab/eschar 100% brown scab   Periwound: intact intact   Drainage: none none   Exposed structures None none   Wound Edges:   Closed with scab/eschar Closed scab/ eschar   Odor: none none   S&S of Infection:   None none   Edema: None none   Sensation: PN - LOPS Neuropathy- LOPS               Measurements: Initial Evaluation  Date:01/24/2018   Length (cm) 0.4   Width (cm) 0.4   Depth (cm) na   Area (cm2) 0.16cm2   Tract/undermine na        Procedures:     Debridement :  none   Cleansed with:   na                                                                       Periwound protected with: betadine   Primary dressing: gauze R lateral ankle  and L lateral foot   Secondary Dressing: Komprex foam horseshoes secured with hypafix   Other:      Patient Education: educated on wound status and POC. Wounds were wetter and top layer or Right lateral malleolus and top layer came off while painting with betadine. Pt has appt scheduled for arterials studies on 2/7. Educated cg to keep wound CDI. Ok to shower, but then air dry, paint with betadine, and replace with bandaid or gauze. May lightly tape komprex to edges to allow dressing and wound to have air to help it stay dry. Ok to leave MAGDA if needed to keep dry.     Previous ed: pt instr increased protein diet, use of MVI if ok with MD; s/s infection, increased erythema and edema/fever/chills/N+V when to call MD/go to ER. Instr rational for wound care products. Instr to make appts for 2 x week. Instr to keep dressings clean and dry, shower on clinic days right before coming in, wrap and bag leg first to keep dry.  Instr pt to call and schedule arterial studies asap.  Instr foot and nail referral has been made. Pt and cg with good understanding.    Professional Collaboration: arterial studies on 2/7 and diabetic shoe fitting appt is directly after today's appt (1/29/18)      Assessment:      Wound etiology: DFU, possible arterial disease    Wound Progress:  More moisture to wound.     Rationale for Treatment: DSD to keep dry until arterial status can be determined; Komprex horseshoes to relieve pressure from areas.    Patient tolerance/compliance: Pt tolerated care well. Appears receptive to instr, and motivated to heal    Complicating factors:DM, possible PVD    Need for ongoing Advanced Wound Care services:continued skilled wound care for debridement as needed, dressing management and skilled clinical observation to prevent complications and expedite healing.        Plan:      Treatment Plan and Recommendations: keep areas clean and dry until arterial status has been determined. Pt to get arterial studies  asap  Diagnosis/ICD9: S91.009s R lateral malleolus, L lateral foot - dry eschar/scab areas. Scab on L dorsal 2nd toe    Procedures/CPT:level 3 assessment     Frequency: 2 xweek      Treatment Goals: STG 2 Weeks  LTG 4 Weeks   Granulation Tissue: % %   Decrease Necrotic Tissue to: % %   Wound Phase:      Decrease Size by: % %   Periwound:      Decrease tracts/undermining by: % %   Decrease Pain:          At the time of each visit a thorough assessment of the patient is completed to assure the  appropriateness of our plan of care.  The dressings or modalities may need to be adapted   from the original plan to address any significant changes in the wound environment.          Clinician Signature:_______________________________Date__________________      Physician Signature:______________________________Date:__________________

## 2018-01-30 VITALS — BODY MASS INDEX: 25.73 KG/M2 | HEIGHT: 72 IN | RESPIRATION RATE: 14 BRPM | WEIGHT: 190 LBS

## 2018-01-30 DIAGNOSIS — M10.9 CONTROLLED GOUT: ICD-10-CM

## 2018-01-30 PROBLEM — F32.A DEPRESSION: Status: ACTIVE | Noted: 2018-01-30

## 2018-01-30 RX ORDER — FLUOXETINE HYDROCHLORIDE 40 MG/1
40 CAPSULE ORAL DAILY
Qty: 90 CAP | Refills: 1 | Status: SHIPPED | OUTPATIENT
Start: 2018-01-30 | End: 2018-07-25 | Stop reason: SDUPTHER

## 2018-01-30 RX ORDER — CLOPIDOGREL BISULFATE 75 MG/1
75 TABLET ORAL DAILY
Qty: 90 TAB | Refills: 3 | Status: SHIPPED | OUTPATIENT
Start: 2018-01-30 | End: 2018-04-03

## 2018-01-30 RX ORDER — ALLOPURINOL 300 MG/1
300 TABLET ORAL DAILY
Qty: 90 TAB | Refills: 3 | Status: CANCELLED | OUTPATIENT
Start: 2018-01-30

## 2018-01-30 NOTE — BH THERAPY
[facility]  Therapy Progress Note    Patient Name: Av Bob  Patient MRN: [id number]  Today's Date: 1/26/2018    Type of session:Individual psychotherapy  Length of session: 45 minutes  Persons in attendance:Patient    Subjective/New Info: Client arrived on time for individual therapy appointment. He reports that he has been doing well since previous visit. Client discussed his frustration at not being able to walk as well as the negative impact that this limitation has had on his life. Client states that he feels like he does have days when he feels down but he states that he is able to move past those and also have good days. Writer and client discussed client's negative thought processes surrounding his inability to walk and explored ways in which he can disconnect from those thought processes. Writer and client discussed reducing the frequency of client's individual therapy sessions to once a month.      Objective/Observations:   Participation: Active verbal participation   Grooming: Casual   Cognition: Alert   Eye contact: Limited   Mood: Euthymic   Affect: Constricted   Thought process: Logical and Goal-directed   Speech: Rate within normal limits and Volume within normal limits   Other:     Diagnoses:   1. Adjustment disorder with depressed mood         Current risk:   SUICIDE: Low   Homicide: Low   Self-harm: Low   Relapse: Low   Other:    Safety Plan reviewed? Not Indicated   If evidence of imminent risk is present, intervention/plan:     Therapeutic Intervention(s): Clarify:  Clarify thoughts and Supportive psychotherapy    Treatment Goal(s)/Objective(s) addressed: Decrease symptoms of depression     Progress toward Treatment Goals: Mild improvement    Plan:  - Next appointment scheduled:  2/26/2018  - Reduce frequency of appointments to once a month    Mariella Levi, Ph.D.  1/30/2018

## 2018-01-31 ENCOUNTER — OCCUPATIONAL THERAPY (OUTPATIENT)
Dept: OCCUPATIONAL THERAPY | Facility: REHABILITATION | Age: 73
End: 2018-01-31
Attending: FAMILY MEDICINE
Payer: MEDICARE

## 2018-01-31 DIAGNOSIS — Z86.73 PERSONAL HISTORY OF TRANSIENT CEREBRAL ISCHEMIA: ICD-10-CM

## 2018-01-31 DIAGNOSIS — M10.9 CONTROLLED GOUT: ICD-10-CM

## 2018-01-31 PROCEDURE — 97112 NEUROMUSCULAR REEDUCATION: CPT

## 2018-01-31 RX ORDER — ALLOPURINOL 300 MG/1
300 TABLET ORAL DAILY
Qty: 90 TAB | Refills: 3 | OUTPATIENT
Start: 2018-01-31

## 2018-01-31 NOTE — OP THERAPY DAILY TREATMENT
"  Outpatient Occupational Therapy  DAILY TREATMENT     [facility] Occupational Therapy [address]  [address]  [place] [address]  Phone:  [phone]  Fax:  [phone]    Date: 01/31/2018    Patient: Av Bob  YOB: 1945  MRN: [id number]     Time Calculation  Start time: 0100  Stop time: 0130 Time Calculation (min): 30 minutes     Chief Complaint: Extremity Weakness    Visit #: 8    SUBJECTIVE: \"I've been stretching my hand\"    OBJECTIVE:  Current objective measures:   Strength:   Upper extremity strength (left):     Shoulder flexion: 1    Shoulder extension:  2-    Shoulder abduction: 2-    Elbow flexion: 3-    Elbow extension: 3-    Forearm pronation: 2-    Forearm supination: 2-    Wrist flexion: 2-    Wrist extension: 3-    Fingers flexion: 3    Fingers extension: 2-/5 RF/SF, 2/5 IF/MF, thumb extension 3/5       Therapeutic Treatments and Modalities:    1. Neuromuscular Re-education (CPT 45359), 30 minutes    Therapeutic Treatments and Modalities Summary: Passive sustained stretch left shoulder to digits, shoulder limited to 90.  Scapular mobilization and postural muscle facilitation.  Isolated thumb extension x 10 reps to full range a.g.  High speed vibration applied dorsal forearm for activation of extensors with focus on wrist/digits separately and combined, from relaxed vs active composite fist position, a.g and g.e, with and without vibration multiple repetitions.  Recommend pt purchase own vibrator and continue with ther ex from today as part of his HEP.        ASSESSMENT:   Response to treatment: Pt making good progress today with a significant increase in volitional left wrist and digit extension in response to facilitory techniques described above.  Edema in dorsal hand and tone has decreased from previous session.  Pt continues to demonstrate motivation.  HEP updated with good understanding.    PLAN/RECOMMENDATIONS:   Plan for treatment: therapy treatment " to continue next visit.  Planned interventions for next visit: continue with current treatment, E-stim attended (CPT 33949), neuromuscular re-education (CPT 09393), self care ADL training (CPT 76690) and therapeutic activities (CPT 70834)

## 2018-02-01 ENCOUNTER — APPOINTMENT (OUTPATIENT)
Dept: WOUND CARE | Facility: MEDICAL CENTER | Age: 73
End: 2018-02-01
Attending: INTERNAL MEDICINE
Payer: MEDICARE

## 2018-02-01 ENCOUNTER — OFFICE VISIT (OUTPATIENT)
Dept: ENDOCRINOLOGY | Facility: MEDICAL CENTER | Age: 73
End: 2018-02-01
Payer: MEDICARE

## 2018-02-01 VITALS
HEART RATE: 60 BPM | OXYGEN SATURATION: 100 % | DIASTOLIC BLOOD PRESSURE: 82 MMHG | HEIGHT: 72 IN | SYSTOLIC BLOOD PRESSURE: 118 MMHG | WEIGHT: 195 LBS | BODY MASS INDEX: 26.41 KG/M2

## 2018-02-01 DIAGNOSIS — E11.42 DIABETIC PERIPHERAL NEUROPATHY (HCC): ICD-10-CM

## 2018-02-01 DIAGNOSIS — Z01.812 PRE-OPERATIVE LABORATORY EXAMINATION: ICD-10-CM

## 2018-02-01 DIAGNOSIS — Z79.4 TYPE 2 DIABETES MELLITUS WITH HYPERGLYCEMIA, WITH LONG-TERM CURRENT USE OF INSULIN (HCC): ICD-10-CM

## 2018-02-01 DIAGNOSIS — E78.2 MIXED HYPERLIPIDEMIA: ICD-10-CM

## 2018-02-01 DIAGNOSIS — E11.65 TYPE 2 DIABETES MELLITUS WITH HYPERGLYCEMIA, WITH LONG-TERM CURRENT USE OF INSULIN (HCC): ICD-10-CM

## 2018-02-01 DIAGNOSIS — I10 ESSENTIAL HYPERTENSION: ICD-10-CM

## 2018-02-01 LAB
ALBUMIN SERPL BCP-MCNC: 3.6 G/DL (ref 3.2–4.9)
ALBUMIN/GLOB SERPL: 1.6 G/DL
ALP SERPL-CCNC: 65 U/L (ref 30–99)
ALT SERPL-CCNC: 18 U/L (ref 2–50)
ANION GAP SERPL CALC-SCNC: 8 MMOL/L (ref 0–11.9)
APPEARANCE UR: ABNORMAL
AST SERPL-CCNC: 21 U/L (ref 12–45)
BASOPHILS # BLD AUTO: 0.3 % (ref 0–1.8)
BASOPHILS # BLD: 0.04 K/UL (ref 0–0.12)
BILIRUB SERPL-MCNC: 0.6 MG/DL (ref 0.1–1.5)
BILIRUB UR QL STRIP.AUTO: NEGATIVE
BUN SERPL-MCNC: 34 MG/DL (ref 8–22)
CALCIUM SERPL-MCNC: 9.4 MG/DL (ref 8.5–10.5)
CHLORIDE SERPL-SCNC: 101 MMOL/L (ref 96–112)
CO2 SERPL-SCNC: 26 MMOL/L (ref 20–33)
COLOR UR: YELLOW
CREAT SERPL-MCNC: 1.68 MG/DL (ref 0.5–1.4)
EOSINOPHIL # BLD AUTO: 0.13 K/UL (ref 0–0.51)
EOSINOPHIL NFR BLD: 1.1 % (ref 0–6.9)
ERYTHROCYTE [DISTWIDTH] IN BLOOD BY AUTOMATED COUNT: 48.5 FL (ref 35.9–50)
EST. AVERAGE GLUCOSE BLD GHB EST-MCNC: 183 MG/DL
GLOBULIN SER CALC-MCNC: 2.3 G/DL (ref 1.9–3.5)
GLUCOSE SERPL-MCNC: 166 MG/DL (ref 65–99)
GLUCOSE UR STRIP.AUTO-MCNC: NEGATIVE MG/DL
HBA1C MFR BLD: 8 % (ref 0–5.6)
HCT VFR BLD AUTO: 38.3 % (ref 42–52)
HGB BLD-MCNC: 12.6 G/DL (ref 14–18)
IMM GRANULOCYTES # BLD AUTO: 0.09 K/UL (ref 0–0.11)
IMM GRANULOCYTES NFR BLD AUTO: 0.8 % (ref 0–0.9)
KETONES UR STRIP.AUTO-MCNC: NEGATIVE MG/DL
LEUKOCYTE ESTERASE UR QL STRIP.AUTO: ABNORMAL
LYMPHOCYTES # BLD AUTO: 0.98 K/UL (ref 1–4.8)
LYMPHOCYTES NFR BLD: 8.4 % (ref 22–41)
MCH RBC QN AUTO: 28.9 PG (ref 27–33)
MCHC RBC AUTO-ENTMCNC: 32.9 G/DL (ref 33.7–35.3)
MCV RBC AUTO: 87.8 FL (ref 81.4–97.8)
MICRO URNS: ABNORMAL
MONOCYTES # BLD AUTO: 0.74 K/UL (ref 0–0.85)
MONOCYTES NFR BLD AUTO: 6.3 % (ref 0–13.4)
NEUTROPHILS # BLD AUTO: 9.68 K/UL (ref 1.82–7.42)
NEUTROPHILS NFR BLD: 83.1 % (ref 44–72)
NITRITE UR QL STRIP.AUTO: NEGATIVE
NRBC # BLD AUTO: 0 K/UL
NRBC BLD-RTO: 0 /100 WBC
PH UR STRIP.AUTO: 7 [PH]
PLATELET # BLD AUTO: 365 K/UL (ref 164–446)
PMV BLD AUTO: 10.2 FL (ref 9–12.9)
POTASSIUM SERPL-SCNC: 4.1 MMOL/L (ref 3.6–5.5)
PROT SERPL-MCNC: 5.9 G/DL (ref 6–8.2)
PROT UR QL STRIP: 100 MG/DL
RBC # BLD AUTO: 4.36 M/UL (ref 4.7–6.1)
RBC UR QL AUTO: ABNORMAL
SODIUM SERPL-SCNC: 135 MMOL/L (ref 135–145)
SP GR UR STRIP.AUTO: 1.01
UROBILINOGEN UR STRIP.AUTO-MCNC: NORMAL MG/DL
WBC # BLD AUTO: 11.7 K/UL (ref 4.8–10.8)
WBC #/AREA URNS HPF: ABNORMAL /HPF

## 2018-02-01 PROCEDURE — 87086 URINE CULTURE/COLONY COUNT: CPT

## 2018-02-01 PROCEDURE — 83036 HEMOGLOBIN GLYCOSYLATED A1C: CPT

## 2018-02-01 PROCEDURE — 85025 COMPLETE CBC W/AUTO DIFF WBC: CPT

## 2018-02-01 PROCEDURE — 87106 FUNGI IDENTIFICATION YEAST: CPT

## 2018-02-01 PROCEDURE — 87077 CULTURE AEROBIC IDENTIFY: CPT

## 2018-02-01 PROCEDURE — 99214 OFFICE O/P EST MOD 30 MIN: CPT | Performed by: INTERNAL MEDICINE

## 2018-02-01 PROCEDURE — 80053 COMPREHEN METABOLIC PANEL: CPT

## 2018-02-01 PROCEDURE — 36415 COLL VENOUS BLD VENIPUNCTURE: CPT

## 2018-02-01 PROCEDURE — 81001 URINALYSIS AUTO W/SCOPE: CPT

## 2018-02-01 NOTE — PROGRESS NOTES
Endocrinology Clinic Progress Note    CC: Diabetes    HPI:  Av Bob is a 72 y.o. old patient who comes in today for routine follow up.     Type 2 diabetes: He is currently on Toujeo 15 units at bedtime and Humalog 8-12 units with meals. He checks blood sugars 3-4 times a day. Glycemic control control is improved. Most readings are now in the range of . No recent hypoglycemia. He remains off metformin.    Hypertension: Blood pressure is well controlled. He reports compliance with medications. He is on ACE inhibitor.    Hyperlipidemia: He is currently on fenofibrate and Lipitor. Cholesterol levels are goal.    ROS:  Constitutional: No unintentional weight loss  Endo: Denies excessive thirst or frequent urination    PMH:  Type 2 diabetes  Hypertension  Hyperlipidemia    EXAM:  Vital signs: /82   Pulse 60   Ht 1.829 m (6')   Wt 88.5 kg (195 lb)   SpO2 100%   BMI 26.45 kg/m²   General: No apparent distress, cooperative  Eyes: No scleral icterus, no discharge  Neck: Normal on external inspection  Resp: Normal effort, clear to auscultation bilaterally  CVS: Regular rate and rhythm, normal S1-S2  Psych: Alert and oriented, normal mood and affect    Assessment and Plan:    1. Type 2 diabetes mellitus with hyperglycemia, with long-term current use of insulin (CMS-[facility])  · Recent hemoglobin A1c is 7.6%  · Goal hemoglobin A1c less than 7.5%  · Continue Toujeo 15 units at bedtime, and Humalog 10 units 3 times a day with meals +2 additional units for every 50mg/dL BG above 150    2. Mixed hyperlipidemia  · Continue Lipitor and fenofibrate    3. Essential hypertension  · Blood pressure is well controlled    4. Diabetic peripheral neuropathy  · Patient has long-standing diabetes and is in need of appropriate diabetic foot wear and custom inserts. He has diabetic neuropathy with callus buildup. He also has a left foot drop with developing plantar flexion contracture secondary to stroke. Due to  neuropathy and necessity to control left foot and ankle in [place] patient requires custom AFO.    Return in about 3 months (around 5/1/2018).    Thank you for allowing me to participate in the care of this patient.    Trinidad Maguire M.D.    This note was created using voice recognition software (Dragon). The accuracy of the dictation is limited by the abilities of the software. I have reviewed the note prior to signing, however some errors in grammar and context are still possible. If you have any questions related to this note please do not hesitate to contact our office.

## 2018-02-01 NOTE — TELEPHONE ENCOUNTER
Please inform patient that we will be holding off on his allopurinol until after his kidney stone procedure and recheck of his kidney labs, as this medication can harm his kidneys.

## 2018-02-01 NOTE — PROGRESS NOTES
Patient with existing type diabetes:  Type 2 diabetes here for routine follow up     Patient's health status since last visit: no change.  Plans on having lithotripsy next week   Issues with diabetes since last visit none.   Current Diabetes Medications: Toujeo 15 units at hs and Humalog 10 unit base and 2:50 over 150 (his wife was not giving him insulin if his blood sugar was less than 150)  HbA1c: @hba1c@  Lab Results   Component Value Date/Time    HBA1C 7.6 01/15/2018 03:09 PM        FSBS  Testing: typically tests 2-3 times per day    Hypoglycemia: none.   Exercise: none    Retinal Exam:current as of 1/16/18    Daily Foot Exam: wife checks his feet, going to wound therapy for sore on right ankle  Foot Exam:  Monofilament: completed in wound therapy on 1/29/18  Tests and Measures: Last A1c 01/15/2018 - 7.6 per EPIC. FBS today 177, yesterday 138 per spouse. Pt states it is usually <150.   Monofilament test reveals R - 1/10 sites sensate, L - 0/10 sites sensate - LOPS dez.   Vascular - PADMINI performed by myself and tech Shabbir -              Right Left   dp - 184 dp - 112   Pt - 180  dp 110   Brachial - 160 (not done - L hemiparisis from CVA)   PADMINI PADMINI      dp - 1.15  Pt - 1.12 dp - 0.7  Pt - 0.68      Doppler exam reveals monophasic waveforms to L dp/pt/ant tib, and biphasic R dp, ant tib possibly monophasic to R pt. Arterial wound healing studies ordered BLE. PAR asked to schedule pt with Dr. Terry for vascular consult.       Flu vaccine: current  Pneumonia vaccine current

## 2018-02-02 ENCOUNTER — PHYSICAL THERAPY (OUTPATIENT)
Dept: PHYSICAL THERAPY | Facility: REHABILITATION | Age: 73
End: 2018-02-02
Attending: FAMILY MEDICINE
Payer: MEDICARE

## 2018-02-02 ENCOUNTER — PATIENT MESSAGE (OUTPATIENT)
Dept: MEDICAL GROUP | Facility: MEDICAL CENTER | Age: 73
End: 2018-02-02

## 2018-02-02 ENCOUNTER — OCCUPATIONAL THERAPY (OUTPATIENT)
Dept: OCCUPATIONAL THERAPY | Facility: REHABILITATION | Age: 73
End: 2018-02-02
Attending: FAMILY MEDICINE
Payer: MEDICARE

## 2018-02-02 DIAGNOSIS — Z99.3 WHEELCHAIR DEPENDENT: ICD-10-CM

## 2018-02-02 DIAGNOSIS — Z86.73 PERSONAL HISTORY OF TRANSIENT CEREBRAL ISCHEMIA: ICD-10-CM

## 2018-02-02 DIAGNOSIS — Z86.73 HISTORY OF ARTERIAL ISCHEMIC STROKE: ICD-10-CM

## 2018-02-02 DIAGNOSIS — M10.9 CONTROLLED GOUT: ICD-10-CM

## 2018-02-02 DIAGNOSIS — R53.81 DEBILITY: ICD-10-CM

## 2018-02-02 PROCEDURE — 97112 NEUROMUSCULAR REEDUCATION: CPT | Mod: KX

## 2018-02-02 PROCEDURE — 97116 GAIT TRAINING THERAPY: CPT

## 2018-02-02 PROCEDURE — 97112 NEUROMUSCULAR REEDUCATION: CPT

## 2018-02-02 NOTE — OP THERAPY DAILY TREATMENT
"  Outpatient Occupational Therapy  DAILY TREATMENT     [facility] Occupational [address].  [address]  [place] [address]  Phone:  [phone]  Fax:  [phone]    Date: 02/02/2018    Patient: Av Bob  YOB: 1945  MRN: [id number]     Time Calculation  Start time: 1030  Stop time: 1130 Time Calculation (min): 60 minutes     Chief Complaint: Extremity Weakness    Visit #: 9    SUBJECTIVE: \"I'm going to order that vibrator\"    OBJECTIVE:  Current objective measures:    Upper extremity strength (left):     Shoulder flexion: 1    Shoulder extension:  2-    Shoulder abduction: 2-    Elbow flexion: 3-    Elbow extension: 3-    Forearm pronation: 2-    Forearm supination: 2-    Wrist flexion: 2-    Wrist extension: 3-    Fingers flexion: 3    Fingers extension: 2-/5 RF/SF, 2/5 IF/MF, thumb extension 3/5    Therapeutic Treatments and Modalities:    1. Neuromuscular Re-education (CPT 66108), 60 minutes    Therapeutic Treatments and Modalities Summary: MHP LH x 5 minutes. Passive sustained stretch left shoulder to digits, shoulder limited to 150.  Scapular mobilization and facilitatory techniques for elevation and retraction with tactile stimulation and joint approximation, associated reactions.  Moderate posterior trunk pressure to facilitate further scapular retraction combined with trunk extension. High speed vibration applied dorsal forearm for activation of extensors with focus on wrist/digits separately and combined, from relaxed vs active composite fist position, a.g and g.e, with and without vibration multiple repetitions.  Left hand grasp on cone to perform pronation/supination x 15 reps.  Given copy of ordering information of vibrator to wife.  HEP updated.  Wife encouraged to attend next session to increase carryover of HEP      ASSESSMENT:   Response to treatment: Pt making steady gains in response to LUE NM re-ed through use of facilitory techniques with an " increase in volitional muscle activity in both proximal and distal muscle groups with minimal influence of tone.  Pt able to demonstrate a functional grasp today of a cylindrical object.  HEP updated with good understanding.    PLAN/RECOMMENDATIONS:   Plan for treatment: therapy treatment to continue next visit.  Planned interventions for next visit: continue with current treatment, E-stim attended (CPT 95638), neuromuscular re-education (CPT 90022), self care ADL training (CPT 08872) and therapeutic activities (CPT 16295)

## 2018-02-03 NOTE — OP THERAPY DAILY TREATMENT
Outpatient Physical Therapy  DAILY TREATMENT     [facility] Physical [address].  [address]  [place] [address]  Phone:  [phone]  Fax:  [phone]    Date: 02/02/2018    Patient: Av Bob  YOB: 1945  MRN: [id number]     Time Calculation  Start time: 1400  Stop time: 1500 Time Calculation (min): 60 minutes     Chief Complaint: L sided weakness s/p CVA  Visit #: 15    SUBJECTIVE:  Patient reports no new changes since last visit.    OBJECTIVE:  Current objective measures:   Sit to stand modA   Gait 125 ft with platform FWW  Light Lesia with 2nd person behind with wheelchair         Therapeutic Treatments and Modalities:     1. Neuromuscular Re-education (CPT 81832), 30 minutes, Education on patient lift off to move forward in W/C vs sliding forward. Incremental bent/reach pivot transfers to improve strength for transfers x 3. With mat raised approx 25 inch, sit to stand x 10 with emphasis on forward WS and increased WB on left side , patient requiring Lesia for facilitation for anterior WS and WB on L side from higher surface. Patient fearful of falling and avoids anterior WS     2. Gait Training (CPT 45906), 30 minutes, 1x 125 ft with platform FWW, cueing for extension and anterolateral WS to the L side. Pre-gait at mat with R side in WB with emphasis on lateral WS and L LE activation x 8, progressing to R fwd step x 5, bwd step x 5, 2 x 5 ft at raised mat , Patient required min/modA for L LWB facilitation and activation to improve increased R step length and control of L LE in stance     Therapeutic Treatment and Modalities Summary: Discussed with patient and wife, incorporating standing WS at sink with bilateral UE in WB for 2 minutes.     Also discussed incorporating increased anterior WS with sit to stand minimizing UE pull on patient transfer pole at home.       Pain rating before treatment: 0  Pain rating after treatment: 0    ASSESSMENT:   Response to  treatment: Patient tolerated treatment well, but demonstrates decreased ability to initiate and sustain L LE activation with transfers and gait. Recommend continue PT to improve functional mobility.     PLAN/RECOMMENDATIONS:   Plan for treatment: therapy treatment to continue next visit. Reassess need for AFO.   Planned interventions for next visit: continue with current treatment.

## 2018-02-05 ENCOUNTER — APPOINTMENT (OUTPATIENT)
Dept: WOUND CARE | Facility: MEDICAL CENTER | Age: 73
End: 2018-02-05
Attending: INTERNAL MEDICINE
Payer: MEDICARE

## 2018-02-05 RX ORDER — ALLOPURINOL 300 MG/1
300 TABLET ORAL DAILY
Qty: 90 TAB | Refills: 0 | Status: SHIPPED | OUTPATIENT
Start: 2018-02-05 | End: 2018-02-09

## 2018-02-06 ENCOUNTER — PHYSICAL THERAPY (OUTPATIENT)
Dept: PHYSICAL THERAPY | Facility: REHABILITATION | Age: 73
End: 2018-02-06
Attending: FAMILY MEDICINE
Payer: MEDICARE

## 2018-02-06 DIAGNOSIS — R53.81 DEBILITY: ICD-10-CM

## 2018-02-06 DIAGNOSIS — Z86.73 HISTORY OF ARTERIAL ISCHEMIC STROKE: ICD-10-CM

## 2018-02-06 DIAGNOSIS — Z99.3 WHEELCHAIR DEPENDENT: ICD-10-CM

## 2018-02-06 LAB
BACTERIA UR CULT: ABNORMAL
BACTERIA UR CULT: ABNORMAL
SIGNIFICANT IND 70042: ABNORMAL
SITE SITE: ABNORMAL
SOURCE SOURCE: ABNORMAL

## 2018-02-06 PROCEDURE — 97116 GAIT TRAINING THERAPY: CPT | Mod: KX

## 2018-02-06 PROCEDURE — 97112 NEUROMUSCULAR REEDUCATION: CPT | Mod: KX

## 2018-02-06 NOTE — OP THERAPY DAILY TREATMENT
Outpatient Physical Therapy  DAILY TREATMENT     [facility] Physical [address].  [address]  [place] [address]  Phone:  [phone]  Fax:  [phone]    Date: 02/06/2018    Patient: Av Bob  YOB: 1945  MRN: [id number]     Time Calculation  Start time: 1005  Stop time: 1100 Time Calculation (min): 55 minutes     Chief Complaint: Poor balance and unstable in standing   Visit #: 16    SUBJECTIVE:  Patient reports no changes since last changes from last session     OBJECTIVE:  Current objective measures:   ModA sit to stand         Therapeutic Treatments and Modalities:     1. Neuromuscular Re-education (CPT 98796), 30 minutes, Continued education on positioning in W/C while preparing for transfer to avoid sliding forward vs hip/trunk activation with lift off. Sit to stand with platform FWW modA, decrease anterior WS. Sit to stand with 26 inch surface height modA x 6 cueing for L LE activation and equal WB while incorporating L UE. R lateral lumbar flexor stretch with seat activation of L lateral lumbar flexors with R lateral reach on dowel. , Patient has poor awareness of L side activation and proprioceptive awareness with body position     2. Gait Training (CPT 49980), 30 minutes, L ankle acewrapped to improve alignment. Pre gait at raised mat on R side. Initially WS R<>L with isometric LE activation x 10 seconds ( able to sustain for 5 seconds) x 10 reps. Progressing to pre-gait with R LE in stance progressing to 2 x 10 ft fwd/bwd at raised mat modA. Gait with WBQC 2 x 12 ft maxA for L anterolateral WS and increased R step length , 2nd person behind with W/C for safety     Therapeutic Treatment and Modalities Summary:     Continue to recommend  incorporating increased anterior WS with sit to stand minimizing UE pull on patient transfer pole at home.       Pain rating before treatment: 0  Pain rating after treatment: 0    ASSESSMENT:   Response to treatment:  Patient tolerated treatment well, but demonstrates impaired L sided weakness and proprioception with decreased sequencing ability limiting ability to get out of W/C without assistance. Patient will continue to benefit from skilled PT to improve mobility and HEP.     PLAN/RECOMMENDATIONS:   Plan for treatment: therapy treatment to continue next visit.  Planned interventions for next visit: gait training (CPT 86480), neuromuscular re-education (CPT 74168) and therapeutic exercise (CPT 69578).

## 2018-02-07 ENCOUNTER — HOSPITAL ENCOUNTER (OUTPATIENT)
Dept: RADIOLOGY | Facility: MEDICAL CENTER | Age: 73
End: 2018-02-07
Attending: NURSE PRACTITIONER
Payer: MEDICARE

## 2018-02-07 DIAGNOSIS — S81.009S WOUND, OPEN, KNEE, LOWER LEG, OR ANKLE WITH COMPLICATION, UNSPECIFIED LATERALITY, SEQUELA: ICD-10-CM

## 2018-02-07 DIAGNOSIS — S81.809S WOUND, OPEN, KNEE, LOWER LEG, OR ANKLE WITH COMPLICATION, UNSPECIFIED LATERALITY, SEQUELA: ICD-10-CM

## 2018-02-07 DIAGNOSIS — S91.009S WOUND, OPEN, KNEE, LOWER LEG, OR ANKLE WITH COMPLICATION, UNSPECIFIED LATERALITY, SEQUELA: ICD-10-CM

## 2018-02-07 PROCEDURE — 93925 LOWER EXTREMITY STUDY: CPT

## 2018-02-07 PROCEDURE — 93922 UPR/L XTREMITY ART 2 LEVELS: CPT

## 2018-02-08 ENCOUNTER — APPOINTMENT (OUTPATIENT)
Dept: OCCUPATIONAL THERAPY | Facility: REHABILITATION | Age: 73
End: 2018-02-08
Attending: FAMILY MEDICINE
Payer: MEDICARE

## 2018-02-08 ENCOUNTER — NON-PROVIDER VISIT (OUTPATIENT)
Dept: WOUND CARE | Facility: MEDICAL CENTER | Age: 73
End: 2018-02-08
Attending: INTERNAL MEDICINE
Payer: MEDICARE

## 2018-02-08 ENCOUNTER — PATIENT MESSAGE (OUTPATIENT)
Dept: MEDICAL GROUP | Facility: MEDICAL CENTER | Age: 73
End: 2018-02-08

## 2018-02-08 DIAGNOSIS — M1A.09X0 IDIOPATHIC CHRONIC GOUT OF MULTIPLE SITES WITHOUT TOPHUS: ICD-10-CM

## 2018-02-08 PROCEDURE — 99213 OFFICE O/P EST LOW 20 MIN: CPT

## 2018-02-08 NOTE — WOUND TEAM
Advanced Wound Care  [facility]  [address]  [address]  [place] [address]  [phone] Fax: [phone]      Encounter Note  For Certification Period:01/24/2018 - 04/16/2018      Referring Physician: Trinidad Maguire  Primary Physician:     Dr. Mitchell Pantoja MD   Consulting Physicians:         Wound(s):S91.009s R lateral malleolus, L lateral foot - dry eschar/scab areas. Scab on L dorsal 2nd toe  Start of Care: 01/24/2018       Subjective:        HPI: Pt is a 72 year old diabetic gentleman with a hx of cardiovascular disease, past CVA with L hemiparesis who is referred by PCP for eval of necrotic areas on R lateral malleolus and L lateral foot. He also has scabbed areas on L dorsal 2nd toe and several small dry scabs on LLE. Pt is wc bound, transfers with assist.  He has PN and LOPS dez feet.              Pain: pt denies pain            Past Medical History:    Current Medications  Allergies: Diphenhydramine hcl; Lorazepam; Ciprofloxacin; and Nkda [no known drug allergy]    Past Surgical History:     Social History:            Objective:      Tests and Measures: Last A1c 01/15/2018 - 7.6 per EPIC. FBS today 177, yesterday 138 per spouse. Pt states it is usually <150.   Monofilament test reveals R - 1/10 sites sensate, L - 0/10 sites sensate - LOPS dez.     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 Arterial wound healing studies ordered BLE. PAR asked to schedule pt with Dr. Terry for vascular consult.    02/08/2018 - vascular study results from [facility] -   Findings   Bilateral.    Doppler waveform of the common femoral artery is of high amplitude and    triphasic.    Doppler waveforms at the  ankle are brisk and biphasic.    Absent flow within the Left VIVIENNE.     Arterial duplex scan was performed in accordance with lower extremity    arterial evaluation protocol - see separate report.    Lower Extremity   Arterial Duplex Report     Vascular Laboratory   CONCLUSIONS   Right Lower Extremity-   Normal flow from the common femoral artery extending distally to the    popliteal artery. Flow within the posterior tibial artery is brisk and    multiphasic.   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     Left Lower Extremity-   Normal flow seen from the common femoral artery extending distally to the    popliteal artery.   Area of elevated velocities seen within the mid segment of the posterior    tibial artery. Consistent with >50% stenosis.   Occlusion of the anterior tibial artery at the mid-distal segment, minimal    flow reversal seen at the level of the ankle.     ELIGIO MADIRGAL     Exam Date:     02/07/2018 14:07        Orthotic, protective, supportive devices: pt is wc bound secondary to L hemiparisis from CVA    Fall Risk Assessment (naomy all that apply with an X):             X   65 years or older                     Fall within the last 2 years, uses   X   Ambulatory devices   X   Loss of protective sensation in feet,          Use of prostethic/orthotic, years                     Presence of lower extremity/foot/toe amputation                   Taking medication that increases risk (per facility policy)  Verbal and written fall prevention info given. Pt is wc bound and transfers with assist.     Wound Characteristics                                                    Location:R lateral malleolus   Initial Evaluation  Date:01/24/2018 Encounter note:  Date: 02/08/2018   Tissue Type and %: 100% dry black scab/eschar 100% moist dark brown eschar   Periwound: intact Intact, erythema   Drainage: None none   Exposed structures None  none   Wound Edges:   Closed with scab/eschar Open with top layer of eschar peeled off   Odor: None none   S&S of Infection:   None none   Edema: None none   Sensation: PN - LOPS Neuropathy- LOPS               Measurements: Initial Evaluation  Date:01/24/2018 Encounter Note 02/08/2018   Length (cm) 0.9 1.0   Width (cm) 0.9 1.0   Depth (cm) na na   Area (cm2) 0.81cm2 1.0cm2   Tract/undermine na na          Wound Characteristics                                                    Location:L lateral foot   Initial Evaluation  Date:01/24/2018 Encounter note:  Date: 02/08/2018/2018   Tissue Type and %: 100% brown scab/eschar 100% brown scab   Periwound: intact intact   Drainage: none none   Exposed structures None none   Wound Edges:   Closed with scab/eschar Closed scab/ eschar   Odor: none none   S&S of Infection:   None none   Edema: None none   Sensation: PN - LOPS Neuropathy- LOPS               Measurements: Initial Evaluation  Date:01/24/2018 Encounter Note 02/08/2018   Length (cm) 0.4 0.4   Width (cm) 0.4 0.4   Depth (cm) na na   Area (cm2) 0.16cm2 0.16cm2   Tract/undermine na na     02/08/2018 - dry eschar areas on L dorsal 2nd and 3rd toes unchanged.     Procedures:     Debridement :  none   Cleansed with:   na                                                                       Periwound protected with: betadine to all eschar areas   Primary dressing: DSD gauze R lateral ankle and L lateral foot   Secondary Dressing: Komprex foam horseshoes secured with hypafix   Other:      Patient Education: educated on wound status and POC. Wounds were wetter and top layer or Right lateral malleolus and top layer came off while painting with betadine. Pt has appt scheduled for arterials studies on 2/7. Educated cg to keep wound CDI. Ok to shower, but then air dry, paint with betadine, and replace with bandaid or gauze. May lightly tape komprex to edges to allow dressing and wound to have air to help it stay dry. Ok to leave  MAGDA if needed to keep dry.     Previous ed: pt instr increased protein diet, use of MVI if ok with MD; s/s infection, increased erythema and edema/fever/chills/N+V when to call MD/go to ER. Instr rational for wound care products. Instr to make appts for 2 x week. Instr to keep dressings clean and dry, shower on clinic days right before coming in, wrap and bag leg first to keep dry.  Instr pt to call and schedule arterial studies asap.  Instr foot and nail referral has been made. Pt and cg with good understanding.    Professional Collaboration: 02/08/2018 - Pt referred to vascular (Dr. Terry) based on vascular study results - PAR asked to schedule pt ASAP. Called Dr. Terry and discussed case, she will look at chart.    Assessment:      Wound etiology: DFU, possible arterial disease    Wound Progress:  No significant change     Rationale for Treatment: DSD to keep dry until arterial status can be determined; Komprex horseshoes to relieve pressure from areas.    Patient tolerance/compliance: Pt tolerated care well. Appears receptive to instr, and motivated to heal    Complicating factors:DM, possible PVD    Need for ongoing Advanced Wound Care services:continued skilled wound care for debridement as needed, dressing management and skilled clinical observation to prevent complications and expedite healing.        Plan:      Treatment Plan and Recommendations: keep areas clean and dry until arterial status has been determined. Pt to get arterial studies asap    Diagnosis/ICD9: S91.009s R lateral malleolus, L lateral foot - dry eschar/scab areas. Scab on L dorsal 2nd toe    Procedures/CPT:level 3 assessment     Frequency: 2 x week      Treatment Goals: STG 2 Weeks  LTG 4 Weeks   Granulation Tissue: % %   Decrease Necrotic Tissue to: % %   Wound Phase:      Decrease Size by: % %   Periwound:      Decrease tracts/undermining by: % %   Decrease Pain:          At the time of each visit a thorough assessment of the  patient is completed to assure the  appropriateness of our plan of care.  The dressings or modalities may need to be adapted   from the original plan to address any significant changes in the wound environment.          Clinician Signature:_______________________________Date__________________      Physician Signature:______________________________Date:__________________

## 2018-02-09 ENCOUNTER — PHYSICAL THERAPY (OUTPATIENT)
Dept: PHYSICAL THERAPY | Facility: REHABILITATION | Age: 73
End: 2018-02-09
Attending: FAMILY MEDICINE
Payer: MEDICARE

## 2018-02-09 DIAGNOSIS — R53.81 DEBILITY: ICD-10-CM

## 2018-02-09 DIAGNOSIS — Z86.73 HISTORY OF ARTERIAL ISCHEMIC STROKE: ICD-10-CM

## 2018-02-09 DIAGNOSIS — Z99.3 WHEELCHAIR DEPENDENT: ICD-10-CM

## 2018-02-09 PROCEDURE — 97116 GAIT TRAINING THERAPY: CPT | Mod: KX

## 2018-02-09 PROCEDURE — 97112 NEUROMUSCULAR REEDUCATION: CPT | Mod: KX

## 2018-02-09 PROCEDURE — 97110 THERAPEUTIC EXERCISES: CPT | Mod: KX

## 2018-02-09 RX ORDER — ALLOPURINOL 300 MG/1
150-300 TABLET ORAL DAILY
Qty: 90 TAB | Refills: 0 | Status: ON HOLD | OUTPATIENT
Start: 2018-02-09 | End: 2018-02-12

## 2018-02-09 NOTE — OP THERAPY DAILY TREATMENT
Outpatient Physical Therapy  DAILY TREATMENT     [facility] [address]  [address]  [place] [address]  Phone:  [phone]  Fax:  [phone]    Date: 02/09/2018    Patient: Av Bob  YOB: 1945  MRN: [id number]     Time Calculation  Start time: 1300  Stop time: 1400 Time Calculation (min): 60 minutes     Chief Complaint: No chief complaint on file.    Visit #: 17    SUBJECTIVE:  Patient reports no falls and feeling tired after last session.     OBJECTIVE:  Current objective measures:   ModA sit to stand with verbal cueing for anterior WS.          Therapeutic Exercises (CPT 11766):     1. Nustep , 10:30  minutes level 1, .20     Therapeutic Treatments and Modalities:     1. Neuromuscular Re-education (CPT 74092), 30 minutes, Patient education on anterior WS and increased use of LE support with sit to stand x 5 with raised mat on R side. In standing WS R<>L with midline orientation and modA facilitation for L LE activation. partial stand to sit x 10 with modA for midline orientation and equal WB , Patient has poor awareness of L side activation and proprioceptive awareness with body position     2. Gait Training (CPT 10103), 15 minutes, L ankle acewrapped to improve alignment. Pre gait at raised mat on R side. Initially WS R<>L with isometric LE activation x 10 seconds . Gait with WBQC 2 x 20 ft maxA for L anterolateral WS and increased R step length , 2nd person behind with W/C for safety     Therapeutic Treatment and Modalities Summary:     Continue to recommend  incorporating increased anterior WS with sit to stand minimizing UE pull on patient transfer pole at home.       Pain rating before treatment: 0  Pain rating after treatment: 0    ASSESSMENT:   Response to treatment: Patient tolerated treatment well. Minimal carryover with anterior WS with transfers. Patient may benefit from R AFO to improve stability of L ankle with transfers.      PLAN/RECOMMENDATIONS:   Plan for treatment: therapy treatment to continue next visit. Emphasis on strengthening, midline, transfers.   Planned interventions for next visit: continue with current treatment.

## 2018-02-09 NOTE — TELEPHONE ENCOUNTER
No, I received a VM from one of their Pharmacists for sig clarification. I spoke to them tonight (02/08/18) to confirm that pt is supposed to only take 150mg of allopurinol until he gets his labs done.

## 2018-02-09 NOTE — TELEPHONE ENCOUNTER
From: Av Bob  To: Mitchell Pantoja M.D.  Sent: 2/8/2018 12:03 PM PST  Subject: Prescription Question    Hi Dr. Pantoja,    Av has still not received his Allopurinol.[facility] said that the instructions on the order were not clear. I assured them that Av knew exactly how the medication was to be taken. [facility] said they have sent two faxes to your office regarding this issue.  Thank you for any help you can give us on this.  Jean-Claude Landakp  ----- Message -----  From: Mitchell Pantoja M.D.  Sent: 2/5/2018 7:23 AM PST  To: Av VINCENZOKimberly Bob  Subject: RE: Prescription Question  Greetings  And Mrs. Bob,   I have refilled the medication. Please take this medication at the reduced dosage until after the procedure as well as after the labs are done. If his labs showed improvement in kidney function, we can resume normal dosage at 300 mg daily.    Sincerely,  Dr. Pantoja      ----- Message -----   From: Av Bob   Sent: 2/2/2018 1:09 PM PST   To: Mitchell Pantoja M.D.  Subject: Prescription Question    Hi Dr. Pantoja,    Thanks for getting back to us on the Allopurinol. Av said he felt comfortable going with the half dose( 150mg) until after the Lithotripsy. The procedures is still on track for February 12th. He is currently out of the medication . I don’t know if you want to send in an order for the lower dose, say a 30 day supply, but that is your call.     Thank you for your attention to this issue.  Jean-Claude Bob

## 2018-02-12 ENCOUNTER — HOSPITAL ENCOUNTER (OUTPATIENT)
Facility: MEDICAL CENTER | Age: 73
End: 2018-02-12
Attending: UROLOGY | Admitting: UROLOGY
Payer: MEDICARE

## 2018-02-12 ENCOUNTER — APPOINTMENT (OUTPATIENT)
Dept: RADIOLOGY | Facility: MEDICAL CENTER | Age: 73
End: 2018-02-12
Attending: UROLOGY
Payer: MEDICARE

## 2018-02-12 VITALS
HEART RATE: 53 BPM | SYSTOLIC BLOOD PRESSURE: 166 MMHG | DIASTOLIC BLOOD PRESSURE: 88 MMHG | OXYGEN SATURATION: 99 % | HEIGHT: 72 IN | TEMPERATURE: 97.2 F | RESPIRATION RATE: 16 BRPM | WEIGHT: 193.78 LBS | BODY MASS INDEX: 26.25 KG/M2

## 2018-02-12 LAB
GLUCOSE BLD-MCNC: 166 MG/DL (ref 65–99)
GLUCOSE BLD-MCNC: 176 MG/DL (ref 65–99)

## 2018-02-12 PROCEDURE — 700111 HCHG RX REV CODE 636 W/ 250 OVERRIDE (IP)

## 2018-02-12 PROCEDURE — A9270 NON-COVERED ITEM OR SERVICE: HCPCS

## 2018-02-12 PROCEDURE — 160047 HCHG PACU  - EA ADDL 30 MINS PHASE II: Performed by: UROLOGY

## 2018-02-12 PROCEDURE — 160046 HCHG PACU - 1ST 60 MINS PHASE II: Performed by: UROLOGY

## 2018-02-12 PROCEDURE — 160029 HCHG SURGERY MINUTES - 1ST 30 MINS LEVEL 4: Performed by: UROLOGY

## 2018-02-12 PROCEDURE — 51798 US URINE CAPACITY MEASURE: CPT

## 2018-02-12 PROCEDURE — 82962 GLUCOSE BLOOD TEST: CPT

## 2018-02-12 PROCEDURE — C1894 INTRO/SHEATH, NON-LASER: HCPCS | Performed by: UROLOGY

## 2018-02-12 PROCEDURE — 160041 HCHG SURGERY MINUTES - EA ADDL 1 MIN LEVEL 4: Performed by: UROLOGY

## 2018-02-12 PROCEDURE — 160025 RECOVERY II MINUTES (STATS): Performed by: UROLOGY

## 2018-02-12 PROCEDURE — 501329 HCHG SET, CYSTO IRRIG Y TUR: Performed by: UROLOGY

## 2018-02-12 PROCEDURE — 700102 HCHG RX REV CODE 250 W/ 637 OVERRIDE(OP)

## 2018-02-12 PROCEDURE — 160002 HCHG RECOVERY MINUTES (STAT): Performed by: UROLOGY

## 2018-02-12 PROCEDURE — 500879 HCHG PACK, CYSTO: Performed by: UROLOGY

## 2018-02-12 PROCEDURE — 700101 HCHG RX REV CODE 250: Mod: JW

## 2018-02-12 PROCEDURE — 160035 HCHG PACU - 1ST 60 MINS PHASE I: Performed by: UROLOGY

## 2018-02-12 PROCEDURE — C1758 CATHETER, URETERAL: HCPCS | Performed by: UROLOGY

## 2018-02-12 PROCEDURE — 160036 HCHG PACU - EA ADDL 30 MINS PHASE I: Performed by: UROLOGY

## 2018-02-12 PROCEDURE — C1769 GUIDE WIRE: HCPCS | Performed by: UROLOGY

## 2018-02-12 PROCEDURE — 160048 HCHG OR STATISTICAL LEVEL 1-5: Performed by: UROLOGY

## 2018-02-12 PROCEDURE — 82365 CALCULUS SPECTROSCOPY: CPT

## 2018-02-12 PROCEDURE — 160009 HCHG ANES TIME/MIN: Performed by: UROLOGY

## 2018-02-12 PROCEDURE — 88300 SURGICAL PATH GROSS: CPT

## 2018-02-12 RX ORDER — ALLOPURINOL 300 MG/1
150 TABLET ORAL DAILY
COMMUNITY
End: 2018-07-20 | Stop reason: CLARIF

## 2018-02-12 RX ORDER — MULTIVIT-MIN/IRON/FOLIC ACID/K 18-600-40
2000 CAPSULE ORAL DAILY
COMMUNITY
End: 2022-01-19

## 2018-02-12 RX ORDER — ASCORBIC ACID 500 MG
500 TABLET ORAL DAILY
COMMUNITY
End: 2020-04-13

## 2018-02-12 RX ORDER — SODIUM CHLORIDE 9 MG/ML
INJECTION, SOLUTION INTRAVENOUS CONTINUOUS
Status: DISCONTINUED | OUTPATIENT
Start: 2018-02-12 | End: 2018-02-12 | Stop reason: HOSPADM

## 2018-02-12 RX ORDER — FLUCONAZOLE 150 MG/1
150 TABLET ORAL DAILY
COMMUNITY
End: 2018-02-26

## 2018-02-12 RX ADMIN — SODIUM CHLORIDE: 9 INJECTION, SOLUTION INTRAVENOUS at 10:27

## 2018-02-12 RX ADMIN — HYDROCODONE BITARTRATE AND ACETAMINOPHEN 15 ML: 2.5; 108 SOLUTION ORAL at 13:00

## 2018-02-12 ASSESSMENT — PAIN SCALES - GENERAL
PAINLEVEL_OUTOF10: 0
PAINLEVEL_OUTOF10: ASSUMED PAIN PRESENT

## 2018-02-12 NOTE — OR SURGEON
Immediate Post OP Note    PreOp Diagnosis: Left renal stone    PostOp Diagnosis: As above    Procedure(s):  CYSTOSCOPY   - Wound Class: Clean Contaminated  LEFT FLEXIBLE URETEROSCOPY WITH LASER LITHOTRIPSY AND STONE BASKETING - Wound Class: Clean Contaminated    Surgeon(s):  Rey Barry M.D.    Anesthesiologist/Type of Anesthesia:  Anesthesiologist: Alejandro Cox M.D./General LMA    Surgical Staff:  Circulator: Letha Benitez R.N.  Laser Staff: Marcos Gupta Scrub: Ansley Peters  Scrub Person: Mike Rios    Specimens:A: Left renal stone for chemical composition analysis    Estimated Blood Loss: N/A    Findings: Yellowish 7mm stone in upper pole calyx with central hard component     Complications: None  Drains: None        2/12/2018 12:11 PM Rey Barry

## 2018-02-12 NOTE — DISCHARGE INSTRUCTIONS
ACTIVITY: Rest and take it easy for the first 24 hours.  A responsible adult is recommended to remain with you during that time.  It is normal to feel sleepy.  We encourage you to not do anything that requires balance, judgment or coordination.    MILD FLU-LIKE SYMPTOMS ARE NORMAL. YOU MAY EXPERIENCE GENERALIZED MUSCLE ACHES, THROAT IRRITATION, HEADACHE AND/OR SOME NAUSEA.    FOR 24 HOURS DO NOT:  Drive, operate machinery or run household appliances.  Drink beer or alcoholic beverages.   Make important decisions or sign legal documents.    SPECIAL INSTRUCTIONS: *NO RESTRICTIONS**    DIET: To avoid nausea, slowly advance diet as tolerated, avoiding spicy or greasy foods for the first day.  Add more substantial food to your diet according to your physician's instructions.  Babies can be fed formula or breast milk as soon as they are hungry.  INCREASE FLUIDS AND FIBER TO AVOID CONSTIPATION.    SURGICAL DRESSING/BATHING: *  Ureteroscopy, Care After  Refer to this sheet in the next few weeks. These instructions provide you with information on caring for yourself after your procedure. Your health care provider may also give you more specific instructions. Your treatment has been planned according to current medical practices, but problems sometimes occur. Call your health care provider if you have any problems or questions after your procedure.   WHAT TO EXPECT AFTER THE PROCEDURE   After your procedure, it is typical to have the following:   · A burning sensation when you urinate.  · Blood in your urine.  HOME CARE INSTRUCTIONS   · Only take medicines as directed by your health care provider. Do not take any over-the-counter pain medication unless your health care provider says it is okay.  · Take a warm bath or hold a warm washcloth over your groin to relieve burning.  · Drink enough fluids to keep your urine clear or pale yellow.  ¨ Drink two 8-ounce glasses of water every hour for the first 2 hours after you get  home.  ¨ Continue to drink water often at home.  · You can eat what you usually do.  · Ask your surgeon when you can do your usual activities.  · If you had a tube placed to keep urine flowing (ureteral stent), ask your health care provider when you need to return to have it removed.  · Keep all follow-up appointments.  SEEK MEDICAL CARE IF:   · You have chills or fever.  · You have burning pain for longer than 24 hours after the procedure.  · You have blood in your urine for longer than 24 hours after the procedure.  SEEK IMMEDIATE MEDICAL CARE IF:   · You have large amounts of blood or clots in your urine.  · You have very bad pain.  · You have chest pain or trouble breathing.     This information is not intended to replace advice given to you by your health care provider. Make sure you discuss any questions you have with your health care provider.    FOLLOW-UP APPOINTMENT:  A follow-up appointment should be arranged with your doctor in **4-6 weeks.*; call to schedule  DR. MORSE [phone].    You should CALL YOUR PHYSICIAN if you develop:  Fever greater than 101 degrees F.  Pain not relieved by medication, or persistent nausea or vomiting.  Excessive bleeding (blood soaking through dressing) or unexpected drainage from the wound.  Extreme redness or swelling around the incision site, drainage of pus or foul smelling drainage.  Inability to urinate or empty your bladder within 8 hours.  Problems with breathing or chest pain.    You should call [phone] if you develop problems with breathing or chest pain.  If you are unable to contact your doctor or surgical center, you should go to the nearest emergency room or urgent care center.  Physician's telephone #: *DR. MORSE [phone].**    If any questions arise, call your doctor.  If your doctor is not available, please feel free to call the Surgical Center at [phone].  The Center is open Monday through Friday from 7AM to 7PM.  You can also call the HEALTH  HOTLINE open 24 hours/day, 7 days/week and speak to a nurse at [phone], or toll free at [phone].    A registered nurse may call you a few days after your surgery to see how you are doing after your procedure.    MEDICATIONS: Resume taking daily medication.  Take prescribed pain medication with food.  If no medication is prescribed, you may take non-aspirin pain medication if needed.  PAIN MEDICATION CAN BE VERY CONSTIPATING.  Take a stool softener or laxative such as senokot, pericolace, or milk of magnesia if needed.    Prescription given for *Hydrocodone and Augmentin**.  Last pain medication given at *1:00 pm**.    If your physician has prescribed pain medication that includes Acetaminophen (Tylenol), do not take additional Acetaminophen (Tylenol) while taking the prescribed medication.    Depression / Suicide Risk    As you are discharged from this [facility] facility, it is important to learn how to keep safe from harming yourself.    Recognize the warning signs:  · Abrupt changes in personality, positive or negative- including increase in energy   · Giving away possessions  · Change in eating patterns- significant weight changes-  positive or negative  · Change in sleeping patterns- unable to sleep or sleeping all the time   · Unwillingness or inability to communicate  · Depression  · Unusual sadness, discouragement and loneliness  · Talk of wanting to die  · Neglect of personal appearance   · Rebelliousness- reckless behavior  · Withdrawal from people/activities they love  · Confusion- inability to concentrate     If you or a loved one observes any of these behaviors or has concerns about self-harm, here's what you can do:  · Talk about it- your feelings and reasons for harming yourself  · Remove any means that you might use to hurt yourself (examples: pills, rope, extension cords, firearm)  · Get professional help from the community (Mental Health, Substance Abuse, psychological  counseling)  · Do not be alone:Call your Safe Contact- someone whom you trust who will be there for you.  · Call your local CRISIS HOTLINE 514-1477 or 032-798-9361  · Call your local Children's Mobile Crisis Response Team [place] (490) 897-2344 or www.[employer]  · Call the toll free National Suicide Prevention Hotlines   · National Suicide Prevention Lifeline 917-236-YCDE (6243)  · National Hope Line Network 800-SUICIDE (718-2415)

## 2018-02-13 ENCOUNTER — APPOINTMENT (OUTPATIENT)
Dept: PHYSICAL THERAPY | Facility: REHABILITATION | Age: 73
End: 2018-02-13
Attending: FAMILY MEDICINE
Payer: MEDICARE

## 2018-02-13 NOTE — PROGRESS NOTES
Patient cannot void, stood patient up to try and void but not able to do so. BS: 568. Will notify MD.    1625: awaiting call back from MD office.  1700: MD ordered to have 15 F yu placed. Patient and wife educated about its use, given leg bag and showed how to change out. Answered all questions. Patient asked to use yu for one week and instructed to call tomorrow to get appt. time.

## 2018-02-14 NOTE — OP REPORT
DATE OF SERVICE:  02/12/2018    PREOPERATIVE DIAGNOSIS:  1.  Left nephrolithiasis.  2.  History of recurrent urinary tract infections.    POSTOPERATIVE DIAGNOSES:  1.  Left nephrolithiasis.  2.  History of recurrent urinary tract infections.    OPERATIONS AND PROCEDURES PERFORMED:  1.  Rigid cystourethroscopy.  2.  Left flexible ureteroscopy with ureteral access sheath placement and   holmium laser lithotripsy of renal stone with stone basketing of 4 mm   fragment.    SURGEON:  Rey Barry MD    ANESTHESIOLOGIST:  Alejandro Cox MD    ANESTHESIA:  General laryngeal mask.    COMPLICATIONS:  None.    DRAINS:  None.    SPECIMENS:  A 4 mm central component of renal stone to pathology for chemical   composition analysis.    INDICATIONS:  The patient is a 72-year-old gentleman with a history of   recurrent nephrolithiasis and recurrent urinary tract infection.  Last summer,   he had an episode of acute urosepsis while in [place], required a stent,   subsequent treatment and then returned to [place] for care.  He has had recurrent   urinary tract infections and has a 9-10 mm stone in the left kidney.  I   discussed treatment options including shock wave lithotripsy versus retrograde   intrarenal approach.  He had a lithotripsy, which failed in [place], so I   have recommended a retrograde approach and we discussed the risks of the   procedure including, but not limited to risk of perioperative urinary tract   infection with urosepsis, risk of ureteral perforation, risk of failure to get   to the stone in 1% of cases as well as inability to completely treat the   stone requiring secondary procedures.  The patient is also aware of the   potential need for stent with subsequent risk of periprocedural urgency,   frequency, hematuria, or flank pain as well as stent migration, requiring a   secondary procedure.  We also discussed the perioperative risk of deep vein   thrombosis, pulmonary embolism, aspiration  pneumonia, heart attack, stroke and   death.  Informed consent was given to me by the patient to proceed and he is   marked on his left thigh with my initials and letter Y for proper site   surgery.    DESCRIPTION IN DETAIL:  After informed consent was obtained, the patient was   brought to the operating room and placed supine.  Bilateral sequential   compression devices in place.  General laryngeal mask anesthetic was   administered by Dr. Cox in a balanced fashion.  The patient received   intravenous antibiotics.  He was positioned in modified lithotomy and the   operative area was Betadine prepped and draped in the sterile fashion.    Surgical time-out was called.  All members of the operative team agree as the   patient's name, procedure to be performed with no objections, attention is   directed towards the procedure.    I began the procedure by passing a generously lubricated 25-Citizen of [place] rigid   cystoscope per urethra with a 30-degree lens.  The anterior urethra was   normal.  Prostatic fossa measured 4.5 cm in length with bilobar subocclusive   hypertrophy.  Upon entering the bladder, serial evaluation shows 2+   trabeculation.  The right ureteral orifice was orthotopic, had clear efflux.    Left ureteral orifice was orthotopic, had clear efflux.  I cannulated the left   ureteral orifice with a 0.35 straight tip sensor wire, pushed it up into the   kidney as documented under fluoroscopy and then withdrew the cystoscope after   draining the bladder.  I subsequently passed the 12-14 Citizen of [place] tapered ureteral   access sheath.  The patient had a capacious ureter, which allowed me to pass   the inner obturator and sheath at the same time.  This went up very easily   without any resistance up into the proximal ureter.  I withdrew the inner   obturator and safety wire and then passed the flexible ureteroscope with   hydrodistention into the kidney.  Serial evaluation showed normal upper pole,   mid pole and lower  pole anatomy.  Stone was identified in the upper portion of   the kidney.  It was treated with a 200 micron laser fiber at a frequency of 5   Hz and an energy level of 400 millijoules.  The outer component of the stone   was yellowish and very soft.  The inner component was very dense and was   difficult to fragment.  I treated all of the outer component with these laser   settings and attempted to increase the laser setting to 1000 millijoules and   the stone still failed to fragment, so I elected to basket this stone.  A   0-tip basket was used, passed up to the level of stone.  The stone was grasped   and pulled down through the access sheath without any difficulty.  This will   be submitted to pathology for chemical composition.  After completing this,   the ureteroscope was reintroduced and much of the small debris was washed out.    Some small, less than 1 mm yellowish pieces remained, but the upper pole,   lower pole and mid pole renal anatomy showed no other abnormalities.  At this   point in time, I pushed the safety wire back to the ureteral access sheath,   withdrew the ureteroscope and then withdrew the ureteral access sheath from   the ureteral orifice.  Withdrawing the ureteroscope serially showed no trauma   in the proximal, mid or distal ureter, and when I withdrew the scope, the wire   was in place.  There was efflux noted.  I went ahead and passed the   cystoscope and documented efflux of blood tinged urine under pressure after   withdrawing the wire.  At the end of the case, the patient tolerated the   procedure well without complication.  He was awakened in the operating room   and transferred to the recovery room after draining the bladder and arrived in   stable condition.       ____________________________________     MD CHATA BONILLA / TONNY    DD:  02/14/2018 13:04:37  DT:  02/14/2018 14:11:52    D#:  [id number]  Job#:  [id number]

## 2018-02-15 ENCOUNTER — NON-PROVIDER VISIT (OUTPATIENT)
Dept: WOUND CARE | Facility: MEDICAL CENTER | Age: 73
End: 2018-02-15
Attending: INTERNAL MEDICINE
Payer: MEDICARE

## 2018-02-15 LAB
APPEARANCE STONE: NORMAL
COMPN STONE: NORMAL
NUMBER STONE: 1
SIZE STONE: NORMAL MM
SPECIMEN WT: 37 MG

## 2018-02-15 PROCEDURE — 97602 WOUND(S) CARE NON-SELECTIVE: CPT

## 2018-02-15 NOTE — WOUND TEAM
Advanced Wound Care  [facility]  [address]  [address]  [place] [address]  [phone] Fax: [phone]      Encounter Note  For Certification Period:01/24/2018 - 04/16/2018      Referring Physician: Trinidad Maguire  Primary Physician:     Dr. Mitchell Pantoja MD   Consulting Physicians:         Wound(s):S91.009s R lateral malleolus, L lateral foot - dry eschar/scab areas. Scab on L dorsal 2nd toe  Start of Care: 01/24/2018       Subjective:        HPI: Pt is a 72 year old diabetic gentleman with a hx of cardiovascular disease, past CVA with L hemiparesis who is referred by PCP for eval of necrotic areas on R lateral malleolus and L lateral foot. He also has scabbed areas on L dorsal 2nd toe and several small dry scabs on LLE. Pt is wc bound, transfers with assist.  He has PN and LOPS dez feet.              Pain: pt denies pain            Past Medical History:    Current Medications  Allergies: Diphenhydramine hcl; Lorazepam; Ciprofloxacin; and Nkda [no known drug allergy]    Past Surgical History:     Social History:            Objective:      Tests and Measures: Last A1c 01/15/2018 - 7.6 per EPIC. FBS today 177, yesterday 138 per spouse. Pt states it is usually <150.   Monofilament test reveals R - 1/10 sites sensate, L - 0/10 sites sensate - LOPS dez.     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 Arterial wound healing studies ordered BLE. PAR asked to schedule pt with Dr. Terry for vascular consult.    02/08/2018 - vascular study results from [facility] -   Findings   Bilateral.    Doppler waveform of the common femoral artery is of high amplitude and    triphasic.    Doppler waveforms at the  ankle are brisk and biphasic.    Absent flow within the Left VIVIENNE.     Arterial duplex scan was performed in accordance with lower extremity    arterial evaluation protocol - see separate report.    Lower Extremity   Arterial Duplex Report     Vascular Laboratory   CONCLUSIONS   Right Lower Extremity-   Normal flow from the common femoral artery extending distally to the    popliteal artery. Flow within the posterior tibial artery is brisk and    multiphasic.   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     Left Lower Extremity-   Normal flow seen from the common femoral artery extending distally to the    popliteal artery.   Area of elevated velocities seen within the mid segment of the posterior    tibial artery. Consistent with >50% stenosis.   Occlusion of the anterior tibial artery at the mid-distal segment, minimal    flow reversal seen at the level of the ankle.     ELIGIO MADRIGAL     Exam Date:     02/07/2018 14:07        Orthotic, protective, supportive devices: pt is wc bound secondary to L hemiparisis from CVA    Fall Risk Assessment (naomy all that apply with an X):             X   65 years or older                     Fall within the last 2 years, uses   X   Ambulatory devices   X   Loss of protective sensation in feet,          Use of prostethic/orthotic, years                     Presence of lower extremity/foot/toe amputation                   Taking medication that increases risk (per facility policy)  Verbal and written fall prevention info given. Pt is wc bound and transfers with assist.     Wound Characteristics                                                    Location:R lateral malleolus   Initial Evaluation  Date:01/24/2018 Encounter note:  Date: 02/15/2018   Tissue Type and %: 100% dry black scab/eschar 100% moist dark brown eschar   Periwound: intact Intact, erythema, flaky skin   Drainage: None None   Exposed  structures None None visible   Wound Edges:   Closed with scab/eschar Open with top layer of eschar peeled off   Odor: None none   S&S of Infection:   None none   Edema: None none   Sensation: PN - LOPS Neuropathy- LOPS               Measurements: Initial Evaluation  Date:01/24/2018 Encounter Note 02/15/2018   Length (cm) 0.9 0.9   Width (cm) 0.9 0.9   Depth (cm) na BAN   Area (cm2) 0.81cm2 0.81 cm2   Tract/undermine na BAN              Wound Characteristics                                                    Location:L lateral foot   Initial Evaluation  Date:01/24/2018 Encounter note:  Date: 02/15/2018   Tissue Type and %: 100% brown scab/eschar 100% tan scab   Periwound: intact intact   Drainage: none none   Exposed structures None None visible   Wound Edges:   Closed with scab/eschar Closed scab/ eschar   Odor: None None   S&S of Infection:   None None   Edema: None None   Sensation: PN - LOPS Neuropathy- LOPS               Measurements: Initial Evaluation  Date:01/24/2018 Encounter Note 02/15/2018   Length (cm) 0.4 0.4   Width (cm) 0.4 0.4   Depth (cm) na BAN   Area (cm2) 0.16cm2 0.16 cm2   Tract/undermine na BAN             02/08/2018 - dry eschar areas on L dorsal 2nd and 3rd toes unchanged.     Procedures:     Debridement : nonselective with NS/Gauze to remove dry skin flakes from right lateral malleolus periwound.   Cleansed with: NS/Gauze                                                                       Periwound protected with: betadine to all eschar areas   Primary dressing: dry gauze R lateral ankle and L lateral foot   Secondary Dressing: Komprex foam horseshoes secured with hypafix   Other:      Patient Education: POC discussed. Patient had arterial studies on 2/7/2018 and is scheduled for a consultation with Dr. Terry on 3/5/2018 at [facility] (this was the earliest available appointment). Patient's wife is changing the patient's dressings at home when necessary.    Previous- educated on wound status  and POC. Wounds were wetter and top layer or Right lateral malleolus and top layer came off while painting with betadine. Pt has appt scheduled for arterials studies on 2/7. Educated cg to keep wound CDI. Ok to shower, but then air dry, paint with betadine, and replace with bandaid or gauze. May lightly tape komprex to edges to allow dressing and wound to have air to help it stay dry. Ok to leave MAGDA if needed to keep dry.     Previous ed: pt instr increased protein diet, use of MVI if ok with MD; s/s infection, increased erythema and edema/fever/chills/N+V when to call MD/go to ER. Instr rational for wound care products. Instr to make appts for 2 x week. Instr to keep dressings clean and dry, shower on clinic days right before coming in, wrap and bag leg first to keep dry.  Instr pt to call and schedule arterial studies asap.  Instr foot and nail referral has been made. Pt and cg with good understanding.    Professional Collaboration: 02/08/2018 - Pt referred to vascular (Dr. Terry) based on vascular study results - PAR asked to schedule pt ASAP. Called Dr. Terry and discussed case, she will look at chart.    Assessment:      Wound etiology: DFU, possible arterial disease    Wound Progress:  No significant change     Rationale for Treatment: Dry gauze to keep dry until arterial status can be determined; Komprex horseshoes to relieve pressure from areas.    Patient tolerance/compliance: Pt tolerated care well. Compliant with POC and appointments.    Complicating factors:DM, possible PVD    Need for ongoing Advanced Wound Care services:continued skilled wound care for debridement as needed, dressing management and skilled clinical observation to prevent complications and expedite healing.        Plan:      Treatment Plan and Recommendations: keep areas clean and dry until arterial status has been determined. Pt to get arterial studies asap    Diagnosis/ICD10: S91.009s R lateral malleolus, L lateral foot - dry  eschar/scab areas. Scab on L dorsal 2nd toe    Procedures/CPT: nonselective debridement 04050    Frequency: 2 x week      Treatment Goals: STG 2 Weeks  LTG 4 Weeks   Granulation Tissue: Pending vascular consultation %   Decrease Necrotic Tissue to: % %   Wound Phase:      Decrease Size by: % %   Periwound:      Decrease tracts/undermining by: % %   Decrease Pain:          At the time of each visit a thorough assessment of the patient is completed to assure the  appropriateness of our plan of care.  The dressings or modalities may need to be adapted   from the original plan to address any significant changes in the wound environment.

## 2018-02-16 ENCOUNTER — PHYSICAL THERAPY (OUTPATIENT)
Dept: PHYSICAL THERAPY | Facility: REHABILITATION | Age: 73
End: 2018-02-16
Attending: FAMILY MEDICINE
Payer: MEDICARE

## 2018-02-16 DIAGNOSIS — Z86.73 HISTORY OF ARTERIAL ISCHEMIC STROKE: ICD-10-CM

## 2018-02-16 DIAGNOSIS — Z99.3 WHEELCHAIR DEPENDENT: ICD-10-CM

## 2018-02-16 DIAGNOSIS — R53.81 DEBILITY: ICD-10-CM

## 2018-02-16 PROCEDURE — 97116 GAIT TRAINING THERAPY: CPT | Mod: KX

## 2018-02-16 PROCEDURE — G8979 MOBILITY GOAL STATUS: HCPCS | Mod: CJ

## 2018-02-16 PROCEDURE — G8978 MOBILITY CURRENT STATUS: HCPCS | Mod: CL

## 2018-02-16 PROCEDURE — 97110 THERAPEUTIC EXERCISES: CPT | Mod: KX

## 2018-02-16 PROCEDURE — 97112 NEUROMUSCULAR REEDUCATION: CPT | Mod: KX

## 2018-02-16 NOTE — OP THERAPY DAILY TREATMENT
Outpatient Physical Therapy  DAILY TREATMENT     [facility] Physical [address]  [address]  [place] [address]  Phone:  [phone]  Fax:  [phone]    Date: 02/16/2018    Patient: Av Bob  YOB: 1945  MRN: [id number]     Time Calculation  Start time: 1300  Stop time: 1400 Time Calculation (min): 60 minutes     Chief Complaint: weakness on R side   Visit #: 18    SUBJECTIVE:  Patient had procedure on earlier this week. Has not been exercising due to recovery.     OBJECTIVE:  Current objective measures:   Sit to stand from standard W/C: mod to maxA            Therapeutic Exercises (CPT 50346):     1. Nustep , 10 minutes LE only, level 2 , cueing for SPM >40, able to tolerate 30-40 SPM     Therapeutic Treatments and Modalities:     1. Gait Training (CPT 35620), 25  minutes, Gait with platform  ft. Gait with SBQC 37 ft modA cueing for step length, upright posture and activation of L LE in stance. Patient L ankle acewrapped for DF assist.     2. Neuromuscular Re-education (CPT 86139), 15  minutes, progressive sit to stand x 6 cueing for anterior WS.In standing with raised mat to the R, midline orientation, patient still requiring modA to reach midline and sustain stand for 10 seconds. R UE reaching L <>R to challenge limits of stability, and WS to R side, patient requiring rest breaks , Patient demonstrates decreased LE strategy with balance, tendency to use R UE for support       Pain rating before treatment: 0  Pain rating after treatment: 0    ASSESSMENT:   Response to treatment: Patient tolerated session well, but continues to have significant LE weakness, poor L side awareness, and impaired balance. Patient will continue to benefit from additional PT to work on functional mobility with transfers, gait and balance in standing positions to increase functional independence with ADL's. Patient progressing towards LTG's         PLAN/RECOMMENDATIONS:    Plan for treatment: therapy treatment to continue next visit. NMRE, balance, gait  Planned interventions for next visit: continue with current treatment. MD progress note sent in today to requesting additional 8 visits over the course of the next 6 weeks.

## 2018-02-16 NOTE — OP THERAPY PROGRESS SUMMARY
Outpatient Physical Therapy  PROGRESS SUMMARY NOTE      [facility] Physical Therapy [place] [address]  [address]  [place] [address]  Phone:  [phone]  Fax:  [phone]    Date of Visit: 02/16/2018    Patient: Av Bob  YOB: 1945  MRN: [id number]     Referring Provider: Mitchell Pantoja M.D.  [address]  [address]  [place] [address]   Referring Diagnosis Personal history of transient ischemic attack (TIA), and cerebral infarction without residual deficits [Z86.73];Dependence on wheelchair [Z99.3]     Visit Diagnoses     ICD-10-CM   1. Wheelchair dependent Z99.3   2. Debility R53.81   3. History of arterial ischemic stroke Z86.73       Rehab Potential: fair    Physical Therapy Occurrence Codes    Date physical therapy care plan established or reviewed:  12/11/17   Date physical therapy treatment started:  11/20/17          Current Cert Period: 11/20/18 to 02/20/2018  Progress Report Period: 12/2/17- 2/16/2018      Functional Limitation G-Codes and Severity Modifiers     Current status: Mobility: Current (): CL   Goal status: Mobility: Goal (): CJ       Progress Summary Details    Patient has been seen for 18 visits. Patient is continuing to use transfer pole at home for transfers and in therapy sessions requiring modA with standard transfers. Patient is ambulating with platform FWW approx 154 ft light Lesia for balance. Patient continues to have difficulty with transfers due to decreased LE strength and in standing continues to demonstrate poor midline awareness with tendency to bias to R side. Patient will continue to benefit from skilled PT services to improve transfer ability to decrease functional dependency in home setting.     Goals:   Short Term Goals:   Patient able to walk 150 feet with assistive device @ SBA  Patient out of bed >75% of day (met)   Independent with transfers using FWW/W/C (not met, min/modA depending on surface height)   Short  term goal time span:  2-4 weeks      Long Term Goals:    Independent with all ADLs ( not met at this time)    Ambulation 300 feet Independently with A.D. (not met at this time)   Long term goal time span:  4-6 weeks      Requesting additional 8 visits  Frequency: 1-2 x a weeks for 6 weeks   Certification Period: 2/16/2018 to 03/30/18     Recommend referral be placed for L AFO from MD for improved safety and L foot position with gait activities.      Referring provider co-signature:  I have reviewed this plan of care and my co-signature certifies the need for services.    Physician Signature: ________________________________ Date: ______________

## 2018-02-19 ENCOUNTER — APPOINTMENT (OUTPATIENT)
Dept: WOUND CARE | Facility: MEDICAL CENTER | Age: 73
End: 2018-02-19
Attending: INTERNAL MEDICINE
Payer: MEDICARE

## 2018-02-20 ENCOUNTER — HOSPITAL ENCOUNTER (OUTPATIENT)
Dept: LAB | Facility: MEDICAL CENTER | Age: 73
End: 2018-02-20
Attending: INTERNAL MEDICINE
Payer: MEDICARE

## 2018-02-20 ENCOUNTER — OCCUPATIONAL THERAPY (OUTPATIENT)
Dept: OCCUPATIONAL THERAPY | Facility: REHABILITATION | Age: 73
End: 2018-02-20
Attending: FAMILY MEDICINE
Payer: MEDICARE

## 2018-02-20 ENCOUNTER — PHYSICAL THERAPY (OUTPATIENT)
Dept: PHYSICAL THERAPY | Facility: REHABILITATION | Age: 73
End: 2018-02-20
Attending: FAMILY MEDICINE
Payer: MEDICARE

## 2018-02-20 DIAGNOSIS — R53.81 DEBILITY: ICD-10-CM

## 2018-02-20 DIAGNOSIS — Z99.3 WHEELCHAIR DEPENDENT: ICD-10-CM

## 2018-02-20 DIAGNOSIS — Z79.4 TYPE 2 DIABETES MELLITUS WITH HYPERGLYCEMIA, WITH LONG-TERM CURRENT USE OF INSULIN (HCC): ICD-10-CM

## 2018-02-20 DIAGNOSIS — Z86.73 HISTORY OF ARTERIAL ISCHEMIC STROKE: ICD-10-CM

## 2018-02-20 DIAGNOSIS — Z86.73 PERSONAL HISTORY OF TRANSIENT CEREBRAL ISCHEMIA: ICD-10-CM

## 2018-02-20 DIAGNOSIS — E11.65 TYPE 2 DIABETES MELLITUS WITH HYPERGLYCEMIA, WITH LONG-TERM CURRENT USE OF INSULIN (HCC): ICD-10-CM

## 2018-02-20 LAB
ANION GAP SERPL CALC-SCNC: 5 MMOL/L (ref 0–11.9)
BUN SERPL-MCNC: 37 MG/DL (ref 8–22)
CALCIUM SERPL-MCNC: 9.5 MG/DL (ref 8.5–10.5)
CHLORIDE SERPL-SCNC: 103 MMOL/L (ref 96–112)
CO2 SERPL-SCNC: 27 MMOL/L (ref 20–33)
CREAT SERPL-MCNC: 1.6 MG/DL (ref 0.5–1.4)
GLUCOSE SERPL-MCNC: 182 MG/DL (ref 65–99)
POTASSIUM SERPL-SCNC: 4.7 MMOL/L (ref 3.6–5.5)
SODIUM SERPL-SCNC: 135 MMOL/L (ref 135–145)

## 2018-02-20 PROCEDURE — 36415 COLL VENOUS BLD VENIPUNCTURE: CPT

## 2018-02-20 PROCEDURE — 97110 THERAPEUTIC EXERCISES: CPT

## 2018-02-20 PROCEDURE — G8985 CARRY GOAL STATUS: HCPCS | Mod: CJ

## 2018-02-20 PROCEDURE — 97116 GAIT TRAINING THERAPY: CPT

## 2018-02-20 PROCEDURE — G8984 CARRY CURRENT STATUS: HCPCS | Mod: CK

## 2018-02-20 PROCEDURE — 97112 NEUROMUSCULAR REEDUCATION: CPT

## 2018-02-20 PROCEDURE — 80048 BASIC METABOLIC PNL TOTAL CA: CPT

## 2018-02-20 NOTE — OP THERAPY DAILY TREATMENT
Outpatient Physical Therapy  DAILY TREATMENT     [facility] Physical [address]  [address]  [place] [address]  Phone:  [phone]  Fax:  [phone]    Date: 02/20/2018    Patient: Av Bob  YOB: 1945  MRN: [id number]     Time Calculation  Start time: 1310  Stop time: 1400 Time Calculation (min): 50 minutes     Chief Complaint: Weakness and Difficulty Walking    Visit #: 19    SUBJECTIVE:  Pt spouse reports that he does not do much at home and he is very difficult to motivate. She does report that he is able to walk using his platform walker and she follows with the wheelchair. Pt very motivated to continue therapy and prefers 1x/wk.    OBJECTIVE:  Current objective measures: ModA with stand-pivot transfers using FWW/SBQC; ModA-->Andrew with use of SBQC for STS; ModA with SBQC for amb ~50'       Therapeutic Exercises (CPT 34018):     Total treatment time spent on Therapeutic Exercises: 15 minutes.      1. Nu-step L3 x15 min    Therapeutic Treatments and Modalities:     1. Gait Training (CPT 60821), 10 minutes, FWW with UE platform x20'; 50' with SBQC and MODA , *Pt initially very weak and with difficulty taking full steps using FWW, then significant improvement in step length/overall stability noted when using SBQC at the end of this session    2. Neuromuscular Re-education (CPT 68443), 20 minutes, STS with quad cane, WS fwd x10 on each LE; reaching in sitting and ball pushes in all directions      Pain rating before treatment: 0  Pain rating after treatment: 0    ASSESSMENT:   Response to treatment: Pt demos improved mobility toward the end of the session today, requiring less assistance from PT. Overall, pt continues to demonstrate posterior COG with tendency to lose balance backward and requires cues to fully extend bilateral knees in standing.    PLAN/RECOMMENDATIONS:   Plan for treatment: therapy treatment to continue next visit.  Planned interventions  for next visit: gait training (CPT 37083), neuromuscular re-education (CPT 20761), therapeutic activities (CPT 35116) and therapeutic exercise (CPT 69900); gait with quad cane, balance/righting reaction training and trunk stabilization training.

## 2018-02-20 NOTE — OP THERAPY PROGRESS SUMMARY
"  Outpatient Occupational Therapy  NEUROLOGICAL PROGRESS SUMMARY NOTE    [facility] Occupational Therapy [address].  [address]  [place] [address]  Phone:  [phone]  Fax:  [phone]    Date of Visit: 02/20/2018    Patient: Av Bob  YOB: 1945  MRN: [id number]     Referring Provider: Mitchell Pantoja M.D.  [address]  [address]  [place] [address]   Referring Diagnosis Personal history of transient ischemic attack (TIA), and cerebral infarction without residual deficits [Z86.73];Dependence on wheelchair [Z99.3]     Visit #: 10    Time Calculation  Start time: 0200  Stop time: 0300 Time Calculation (min): 60 minutes     Occupational Therapy Occurrence Codes    Date of onset of impairment:  12/31/16   Date of occupational therapy care plan established or reviewed:  11/29/17   Date of occupational therapy treatment started:  11/29/17          Chief Complaint: Extremity Weakness and Self Care Duties    Visit Diagnoses     ICD-10-CM   1. Personal history of transient cerebral ischemia Z86.73       Subjective \"I had a kidney stone removed last Monday\"    Objective:     Passive Range of Motion:     Passive Range of Motion Comments:  PROM LUE WNL except shoulder ff/abd to 100 degrees and wrist extension to 40 degrees limited by pain    Strength:     , Prehension, Pinch:  /Prehension (left):      strength: Impaired (10lbs)  /Prehension (right):      strength: Within functional limits (60 lbs)    Strength Comments:  Upper extremity strength (left):     Shoulder flexion: 2-    Shoulder abduction: 2-    Shoulder external rotation: 2-    Elbow flexion: 3+    Elbow extension: 3+    Forearm pronation: 3+    Forearm supination: 2-    Wrist flexion: 3+    Wrist extension: 3-    Fingers flexion: 3+    Fingers extension: 3   (digits1-3 are 3/5 strength; digits 4-5 are 2-/5 strength)    Fingers abduction: 2-    Fingers adduction: 2    Tone, Sensation and " Coordination:   Tone:     Left upper extremity muscle tone: Hypertonic    Modified Deuce:   Upper extremity (left):     Shoulder flexors: 1+    Shoulder internal rotators: 1+    Elbow flexors: 1+    Wrist flexors: 2    Finger flexors: 2    Activities of Daily Living:     ADL Comments:  Supervised shower, set-up dressing, mod assist toileting after BM for hygiene only.        Therapeutic Treatments and Modalities:    1. Neuromuscular Re-education (CPT 43639), 60 minutes    Therapeutic Treatments and Modalities Summary: LUE passive sustained stretch to maximum range, limited by pain.  Vibration to scapular musculature and dorsal proximal forearm to facilitate movement focused on scapular elevation/retraction, shoulder ER with light resistance from elbow flexed at 90, and wrist/digit extension.  Active elbow flex/ext table top combined with weight bearing, shoulder stabilization, and maintaining grasp on medium sized cones.  Holding cone for active pronation and supination to neutral position.  Functional grasp/release of cone with assist r/t digit extensor weakness.  Re-tested areas above and updated HEP with good understanding.      Assessment, Response and Plan:   Impairments: abnormal muscle firing, abnormal muscle tone, abnormal or restricted ROM, activity intolerance, fine motor function, impaired physical strength, lacks appropriate home exercise program and limited ADL's    Assessment details:  Pt has actively participated in skilled OT program sporadically since his initial evaluation on 11/29/17 and has made slow but steady progress towards the goals established in the plan of care related to his LUE neuromuscular status in the areas of strength, motor control, and use as a gross functional assist during his ADLs and transfers.  Pt has responded well to a combination of facilitory techniques to increase extensor activity and inhibitory techniques to decrease flexor spasticity.  Pt fatigues easily but is  motivated to continue to maximize the functional use of his left UE to be independent with his ADLs and transfers.  Barriers to therapy:  Psychosocial  Prognosis: fair    Goals:   Short Term Goals:   See STG's from initial POC  Short term goal timespan:  2-4 weeks    Long Term Goals:   Pt will increase his LUE AROM/strength to WFL to perform modified daily routine  Pt will require supervision only for toileting hygiene with AD PRN  Pt will be independent toilet transfers with DME  Pt will be independent HEP for stretching and strengthening LUE  Pt will score <= 20% disability on the Quick Dash    Long term goal timespan:  6-8 weeks    Plan:   Therapy options:  Occupational therapy treatment to continue  Planned therapy interventions:  E Stim Attended (CPT 57906), Functional Training, Self Care (CPT 27251), Manual Therapy (CPT 76620), Neuromuscular Re-education (CPT 34822), Therapeutic Activities (CPT 53932) and Therapeutic Exercise (CPT 48349)  Frequency:  1x week (1-2 x week)  Duration in weeks:  10  Duration in visits:  10  Discussed with:  Patient and family      Functional Limitation G-Codes and Severity Modifiers  OT Functional Assessment Tool Used: Quick Dash  OT Functional Assessment Score: 31% Disability  Quickdash General Total Score: 31.82  Current: Carry: Current (): CK    Goal: Carry: Goal  (): CJ       Referring provider co-signature:  I have reviewed this plan of care and my co-signature certifies the need for services.  Certification Dates:   From 2/20/18     To 5/1/18    Physician Signature: ________________________________ Date: ______________

## 2018-02-22 ENCOUNTER — PHYSICAL THERAPY (OUTPATIENT)
Dept: PHYSICAL THERAPY | Facility: REHABILITATION | Age: 73
End: 2018-02-22
Attending: FAMILY MEDICINE
Payer: MEDICARE

## 2018-02-22 ENCOUNTER — NON-PROVIDER VISIT (OUTPATIENT)
Dept: WOUND CARE | Facility: MEDICAL CENTER | Age: 73
End: 2018-02-22
Attending: INTERNAL MEDICINE
Payer: MEDICARE

## 2018-02-22 DIAGNOSIS — Z99.3 WHEELCHAIR DEPENDENT: ICD-10-CM

## 2018-02-22 DIAGNOSIS — Z86.73 HISTORY OF ARTERIAL ISCHEMIC STROKE: ICD-10-CM

## 2018-02-22 DIAGNOSIS — R53.81 DEBILITY: ICD-10-CM

## 2018-02-22 PROCEDURE — 99213 OFFICE O/P EST LOW 20 MIN: CPT

## 2018-02-22 PROCEDURE — 97112 NEUROMUSCULAR REEDUCATION: CPT | Mod: KX

## 2018-02-22 PROCEDURE — 97110 THERAPEUTIC EXERCISES: CPT | Mod: KX

## 2018-02-22 PROCEDURE — 97116 GAIT TRAINING THERAPY: CPT | Mod: KX

## 2018-02-22 NOTE — WOUND TEAM
Advanced Wound Care  [facility]  [address]  [address]  [place] [address]  [phone] Fax: [phone]      Encounter Note  For Certification Period:01/24/2018 - 04/16/2018      Referring Physician: Trinidad Maguire  Primary Physician:     Dr. Mitchell Pantoja MD   Consulting Physicians:         Wound(s):S91.009s R lateral malleolus, L lateral foot - dry eschar/scab areas. Scab on L dorsal 2nd toe  Start of Care: 01/24/2018       Subjective:        HPI: Pt is a 72 year old diabetic gentleman with a hx of cardiovascular disease, past CVA with L hemiparesis who is referred by PCP for eval of necrotic areas on R lateral malleolus and L lateral foot. He also has scabbed areas on L dorsal 2nd toe and several small dry scabs on LLE. Pt is wc bound, transfers with assist.  He has PN and LOPS dez feet.              Pain: pt denies pain              Current Medications: no med changes per pt      Allergies: Diphenhydramine hcl; Lorazepam; Ciprofloxacin; and Nkda [no known drug allergy]          Objective:      Tests and Measures: Last A1c 01/15/2018 - 7.6 per EPIC. FBS today 177, yesterday 138 per spouse. Pt states it is usually <150.   Monofilament test reveals R - 1/10 sites sensate, L - 0/10 sites sensate - LOPS dez.     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 Arterial wound healing studies ordered BLE. PAR asked to schedule pt with Dr. Terry for vascular consult.    02/08/2018 - vascular study results from [facility] -   Findings   Bilateral.    Doppler waveform of the common femoral artery is of high amplitude and    triphasic.    Doppler waveforms at the ankle are brisk and biphasic.    Absent flow  within the Left VIVIENNE.     Arterial duplex scan was performed in accordance with lower extremity    arterial evaluation protocol - see separate report.    Lower Extremity   Arterial Duplex Report     Vascular Laboratory   CONCLUSIONS   Right Lower Extremity-   Normal flow from the common femoral artery extending distally to the    popliteal artery. Flow within the posterior tibial artery is brisk and    multiphasic.   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     Left Lower Extremity-   Normal flow seen from the common femoral artery extending distally to the    popliteal artery.   Area of elevated velocities seen within the mid segment of the posterior    tibial artery. Consistent with >50% stenosis.   Occlusion of the anterior tibial artery at the mid-distal segment, minimal    flow reversal seen at the level of the ankle.     ELIGIO MADRIGAL     Exam Date:     02/07/2018 14:07        Orthotic, protective, supportive devices: pt is wc bound secondary to L hemiparisis from CVA    Fall Risk Assessment (naomy all that apply with an X):             X   65 years or older                     Fall within the last 2 years, uses   X   Ambulatory devices   X   Loss of protective sensation in feet,          Use of prostethic/orthotic, years                     Presence of lower extremity/foot/toe amputation                   Taking medication that increases risk (per facility policy)  Verbal and written fall prevention info given. Pt is wc bound and transfers with assist.     Wound Characteristics                                                    Location:R lateral malleolus   Initial Evaluation  Date:01/24/2018 Encounter note:  Date: 02/22/2018   Tissue Type and %: 100% dry black scab/eschar 100% moist dark brown eschar   Periwound: intact Intact, erythema, flaky skin   Drainage: None None   Exposed structures None None visible   Wound Edges:    Closed with scab/eschar Open with top layer of eschar peeled off   Odor: None none   S&S of Infection:   None none   Edema: None none   Sensation: PN - LOPS Neuropathy- LOPS               Measurements: Initial Evaluation  Date:01/24/2018 Encounter Note 02/22/2018   Length (cm) 0.9 1.0   Width (cm) 0.9 1.1   Depth (cm) na BAN   Area (cm2) 0.81cm2 1.1 cm2   Tract/undermine na BAN              Wound Characteristics                                                    Location:L lateral foot   Initial Evaluation  Date:01/24/2018 Encounter note:  Date: 02/22/2018   Tissue Type and %: 100% brown scab/eschar 100% tan scab   Periwound: intact intact   Drainage: none none   Exposed structures None None visible   Wound Edges:   Closed with scab/eschar Closed scab/ eschar   Odor: None None   S&S of Infection:   None None   Edema: None None   Sensation: PN - LOPS Neuropathy- LOPS               Measurements: Initial Evaluation  Date:01/24/2018 Encounter Note 02/22/2018   Length (cm) 0.4 0.2   Width (cm) 0.4 0.2   Depth (cm) na BAN   Area (cm2) 0.16cm2 0.04 cm2   Tract/undermine na BAN             02/08/2018 - dry eschar areas on L dorsal 2nd toes unchanged, 0.5x0.6cm. None noted on 3rd toe     Procedures:     Debridement : none   Cleansed with: NS/Gauze                                                                       Periwound protected with: betadine to all eschar areas   Primary dressing: dry gauze R lateral ankle and L lateral foot   Secondary Dressing: Komprex foam horseshoes secured with hypafix   Other:      Patient Education: POC discussed. Patient had arterial studies on 2/7/2018 and is scheduled for a consultation with Dr. Terry on 3/5/2018 at [facility] (this was the earliest available appointment). Patient's wife is changing the patient's dressings at home when necessary.    Previous- educated on wound status and POC. Wounds were wetter and top layer or Right lateral malleolus and top layer came off while painting with  betadine. Pt has appt scheduled for arterials studies on 2/7. Educated cg to keep wound CDI. Ok to shower, but then air dry, paint with betadine, and replace with bandaid or gauze. May lightly tape komprex to edges to allow dressing and wound to have air to help it stay dry. Ok to leave MAGDA if needed to keep dry.     Previous ed: pt instr increased protein diet, use of MVI if ok with MD; s/s infection, increased erythema and edema/fever/chills/N+V when to call MD/go to ER. Instr rational for wound care products. Instr to make appts for 2 x week. Instr to keep dressings clean and dry, shower on clinic days right before coming in, wrap and bag leg first to keep dry.  Instr pt to call and schedule arterial studies asap.  Instr foot and nail referral has been made. Pt and cg with good understanding.    Professional Collaboration: 02/08/2018 - Pt referred to vascular (Dr. Terry) based on vascular study results - PAR asked to schedule pt ASAP. Called Dr. Terry and discussed case, she will look at chart.    Assessment:      Wound etiology: DFU, possible arterial disease    Wound Progress:  No significant change     Rationale for Treatment: Dry gauze to keep dry until arterial status can be determined; Komprex horseshoes to relieve pressure from areas.    Patient tolerance/compliance: Pt tolerated care well. Compliant with POC and appointments.    Complicating factors:DM, possible PVD    Need for ongoing Advanced Wound Care services:continued skilled wound care for debridement as needed, dressing management and skilled clinical observation to prevent complications and expedite healing.        Plan:      Treatment Plan and Recommendations: keep areas clean and dry until arterial status has been determined. Pt to get arterial studies asap    Diagnosis/ICD10: S91.009s R lateral malleolus, L lateral foot - dry eschar/scab areas. Scab on L dorsal 2nd toe    Procedures/CPT: nonselective debridement 71536    Frequency: 2 x  week      Treatment Goals: STG 2 Weeks  LTG 4 Weeks   Granulation Tissue: Pending vascular consultation %   Decrease Necrotic Tissue to: % %   Wound Phase:      Decrease Size by: % %   Periwound:      Decrease tracts/undermining by: % %   Decrease Pain:          At the time of each visit a thorough assessment of the patient is completed to assure the  appropriateness of our plan of care.  The dressings or modalities may need to be adapted   from the original plan to address any significant changes in the wound environment.

## 2018-02-22 NOTE — OP THERAPY DAILY TREATMENT
Outpatient Physical Therapy  DAILY TREATMENT     [facility] [address].  [address]  [place] [address]  Phone:  [phone]  Fax:  [phone]    Date: 02/22/2018    Patient: Av Bob  YOB: 1945  MRN: [id number]     Time Calculation  Start time: 1300  Stop time: 1400 Time Calculation (min): 60 minutes     Chief Complaint: LE weakness     Visit #: 20    SUBJECTIVE:  Patient reported that he is receiving his diabetic shoes tomorrow. No reported falls.     Called [facility], and patient does have referral for AFO. Recommended that patient consider AFO for safe position of L foot with transfers and gait.     OBJECTIVE:  Current objective measures:  Sit to stand: modA cueing for anterior WS            Therapeutic Exercises (CPT 93851):     1. Nustep , x 15 minutes level 3     Therapeutic Treatments and Modalities:     1. Neuromuscular Re-education (CPT 58794), sit to stand 2x 10 reps, progressing to mini squats  4x 8 reps modA with cueing for midline , WS L<>R in stance, attempted marching modA for R knee facilitation ,  Patient required rest breaks during session and cueing for anterolateral WS vs posterior WS     3. Gait Training (CPT 12377), Gait 1x 20 ft, 2 x 25 ft modA with SBQC, patient required rest breaks both in standing and sitting during gait , cueing for L anterolateral WS and L stance LE actatiivon and improving R step length     Therapeutic Treatment and Modalities Summary: Created exercise schedule for patient 4x a day and reviewed with patient and wife. Discussed moving session to 1x a week to maximize time with PT.     Time-based treatments/modalities:  Gait training minutes (CPT 42580): 25 minutes  Therapeutic exercise minutes (CPT 90330): 15 minutes  Neuromusc re-ed, balance, coor, post minutes (CPT 17161): 15 minutes       ASSESSMENT:   Response to treatment: Patient tolerated treatment with rest breaks. Recommend L articulating  AFO for safety with gait and transfers. Patient continues to demonstrate decreased R sided weakness, poor midline orientation, and posterior WS limiting ability to transfer and ambulate without assistance at this time.     PLAN/RECOMMENDATIONS:   Plan for treatment: therapy treatment to continue next visit. Sit to stand, NMRE, gait training with SBQC.   Planned interventions for next visit: continue with current treatment.

## 2018-02-26 ENCOUNTER — OFFICE VISIT (OUTPATIENT)
Dept: BEHAVIORAL HEALTH | Facility: PHYSICIAN GROUP | Age: 73
End: 2018-02-26
Payer: MEDICARE

## 2018-02-26 ENCOUNTER — APPOINTMENT (OUTPATIENT)
Dept: WOUND CARE | Facility: MEDICAL CENTER | Age: 73
End: 2018-02-26
Attending: INTERNAL MEDICINE
Payer: MEDICARE

## 2018-02-26 DIAGNOSIS — F43.21 ADJUSTMENT DISORDER WITH DEPRESSED MOOD: ICD-10-CM

## 2018-02-26 DIAGNOSIS — F32.A DEPRESSION, UNSPECIFIED DEPRESSION TYPE: ICD-10-CM

## 2018-02-26 PROCEDURE — 90834 PSYTX W PT 45 MINUTES: CPT | Performed by: PSYCHOLOGIST

## 2018-02-26 PROCEDURE — 99213 OFFICE O/P EST LOW 20 MIN: CPT | Performed by: NURSE PRACTITIONER

## 2018-02-26 NOTE — PROGRESS NOTES
[facility]  PSYCHIATRIC FOLLOW-UP NOTE    Name: Av Bob  MRN: [id number]  : 1945  Age: 72 y.o.  Date of assessment: 2018  PCP: Mitchell Pantoja M.D.  Persons in attendance: Patient and wife, NP student GAGAN Rodney  Total face-to-face time: 25 minutes    REASON FOR VISIT/CHIEF COMPLAINT (as stated by Patient):  Av Bob is a 72 y.o., White male, attending follow-up appointment for recheck of depression.      HISTORY OF PRESENT ILLNESS:    Patient and wife report his mood has gradually improved. Wife has noticed fluoxetine seems to help his depression, seeing more positive changes with time. He is working at physical therapy, with the goal of walking again. Enjoys time with his family, especially his grandchildren. Denies thoughts of suicide. Appetite has been good, is sleeping better at night.     PSYCHOSOCIAL CHANGES SINCE PREVIOUS CONTACT:  No drinking/drug use      MEDICATION SIDE EFFECTS: none    REVIEW OF SYSTEMS:      General appearance: casually dressed  male, good grooming/hygiene. Pleasant, makes jokes today and smiles often.  Constitutional negative - no fever/chills   Eyes not tested   Ears/Nose/Mouth/Throat not tested   Cardiovascular not tested   Respiratory negative - no acute distress or shortness of breath   Gastrointestinal negative   Genitourinary not tested   Muscular not tested   Integumentary not tested   Neurological not tested   Endocrine not tested   Hematologic/Lymphatic not tested       PSYCHIATRIC EXAMINATION/MENTAL STATUS  There were no vitals taken for this visit.  Participation: Active verbal participation, Attentive, Engaged and Open to feedback  Grooming:Casual and Neat  Orientation: Fully Oriented  Eye contact: Good  Behavior:Calm   Mood: Euthymic  Affect: Full range and Congruent with content  Thought process: Logical and Goal-directed  Thought content:  Within normal limits  Speech: Rate within normal limits and Volume  within normal limits  Perception:  Within normal limits  Memory:  some forgetfulness, wife prompting helpful  Insight: Good  Judgment: Good  Family/couple interaction observations:   Other:    Current risk:    Suicide: Low   Homicide: Not applicable   Self-harm: Not applicable  Relapse: Not applicable  Other:   Crisis Safety Plan reviewed?No  If evidence of imminent risk is present, intervention/plan:    Medical Records/Labs/Diagnostic Tests Reviewed: none    Medical Records/Labs/Diagnostic Tests Ordered: none    DIAGNOSTIC IMPRESSION(S):  1. Depression, unspecified depression type           ASSESSMENT AND PLAN:  Depression is improving, continue with fluoxetine at current dose.  -normalized feelings of sadness related to physical limitations and health problems. 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 Reviewed rationale for staying on antidepressant for now, will discuss taper off later after he has been in remission for 6-12 months.       Current Outpatient Prescriptions:   •  allopurinol (ZYLOPRIM) 300 MG Tab, Take 150 mg by mouth every day., Disp: , Rfl:   •  ascorbic acid (VITAMIN C) 500 MG tablet, Take 500 mg by mouth every day., Disp: , Rfl:   •  Cholecalciferol (VITAMIN D) 2000 units Cap, Take 1 Cap by mouth every evening., Disp: , Rfl:   •  insulin lispro (HUMALOG) 100 UNIT/ML Solution, Inject 10-14 Units as instructed 3 times a day before meals. 10 units every meal up to , then add 2 units for Every 50 above 150 BG, Disp: , Rfl:   •  Acetaminophen (TYLENOL PO), Take 1,250 mg by mouth 2 times a day as needed. 625 mg each tab, Disp: , Rfl:   •  NON SPECIFIED, Apply 1 Application to affected area(s) 3 times a day as needed. Therawax, Disp: , Rfl:   •  clopidogrel (PLAVIX) 75 MG Tab, Take 1 Tab by mouth every day., Disp: 90 Tab, Rfl: 3  •  fluoxetine (PROZAC) 40 MG capsule, Take 1 Cap by mouth every day., Disp: 90 Cap, Rfl: 1  •   Insulin Glargine (TOUJEO SOLOSTAR) 300 UNIT/ML Solution Pen-injector, Inject 15 Units as instructed every evening., Disp: 3 PEN, Rfl: 6  •  magnesium oxide (MAG-OX) 400 MG Tab, Take 400 mg by mouth 3 times a day., Disp: , Rfl:   •  metoprolol (TOPROL-XL) 200 MG XL tablet, Take 1 Tab by mouth every day., Disp: 90 Tab, Rfl: 3  •  fenofibrate (TRICOR) 145 MG Tab, Take 1 Tab by mouth every day., Disp: 90 Tab, Rfl: 3  •  losartan (COZAAR) 50 MG Tab, Take 1 Tab by mouth every day., Disp: 30 Tab, Rfl: 6  •  spironolactone (ALDACTONE) 50 MG Tab, Take 1 Tab by mouth every day., Disp: 90 Tab, Rfl: 3  •  aspirin 81 MG tablet, Take 81 mg by mouth every day., Disp: , Rfl:   •  atorvastatin (LIPITOR) 40 MG Tab, Take 0.5 Tabs by mouth every day., Disp: 90 Tab, Rfl: 3  •  CENTRUM SILVER PO, Take 1 Tab by mouth every day., Disp: , Rfl:       CHANELLE Lu

## 2018-02-27 ENCOUNTER — HOSPITAL ENCOUNTER (OUTPATIENT)
Dept: LAB | Facility: MEDICAL CENTER | Age: 73
End: 2018-02-27
Attending: INTERNAL MEDICINE
Payer: MEDICARE

## 2018-02-27 DIAGNOSIS — E11.65 TYPE 2 DIABETES MELLITUS WITH HYPERGLYCEMIA, WITH LONG-TERM CURRENT USE OF INSULIN (HCC): ICD-10-CM

## 2018-02-27 DIAGNOSIS — Z79.4 TYPE 2 DIABETES MELLITUS WITH HYPERGLYCEMIA, WITH LONG-TERM CURRENT USE OF INSULIN (HCC): ICD-10-CM

## 2018-02-27 LAB
ANION GAP SERPL CALC-SCNC: 7 MMOL/L (ref 0–11.9)
BUN SERPL-MCNC: 33 MG/DL (ref 8–22)
CALCIUM SERPL-MCNC: 9.5 MG/DL (ref 8.5–10.5)
CHLORIDE SERPL-SCNC: 103 MMOL/L (ref 96–112)
CO2 SERPL-SCNC: 25 MMOL/L (ref 20–33)
CREAT SERPL-MCNC: 1.58 MG/DL (ref 0.5–1.4)
GLUCOSE SERPL-MCNC: 174 MG/DL (ref 65–99)
POTASSIUM SERPL-SCNC: 4.5 MMOL/L (ref 3.6–5.5)
SODIUM SERPL-SCNC: 135 MMOL/L (ref 135–145)

## 2018-02-27 PROCEDURE — 80048 BASIC METABOLIC PNL TOTAL CA: CPT

## 2018-02-27 PROCEDURE — 36415 COLL VENOUS BLD VENIPUNCTURE: CPT

## 2018-02-27 NOTE — BH THERAPY
[facility]  Therapy Progress Note    Patient Name: Av Bob  Patient MRN: [id number]  Today's Date: 2/26/2018    Type of session:Individual psychotherapy  Length of session: 45 minutes  Persons in attendance:Patient    Subjective/New Info: Client arrived on time for individual therapy appointment. He reports that he has been doing well since previous visit. Client discussed continuing to feel like he is not making very much progress toward his goal of walking. Client was able to identify one success last week when he was able to walk 28 steps with a cane at physical therapy. Writer and client discussed the factors in client's life that help to motivate him as well as the ones that hinder his motivation. Writer and client discussed creating smaller milestones toward his larger goal that client can focus on to help maintain his motivation. Writer and client discussed client's values and how these may help him reach his goals.      Objective/Observations:   Participation: Active verbal participation   Grooming: Casual   Cognition: Alert   Eye contact: Limited   Mood: Euthymic   Affect: Constricted   Thought process: Logical and Goal-directed   Speech: Rate within normal limits and Volume within normal limits   Other:     Diagnoses:   1. Adjustment disorder with depressed mood         Current risk:   SUICIDE: Low   Homicide: Low   Self-harm: Low   Relapse: Low   Other:    Safety Plan reviewed? Not Indicated   If evidence of imminent risk is present, intervention/plan:     Therapeutic Intervention(s): Goal-setting and Supportive psychotherapy    Treatment Goal(s)/Objective(s) addressed: Decrease symptoms of depression     Progress toward Treatment Goals: Mild improvement    Plan:  - Next appointment scheduled:  4/20/2018    Mariella Levi, Ph.D.  2/26/2018

## 2018-02-28 ENCOUNTER — PHYSICAL THERAPY (OUTPATIENT)
Dept: PHYSICAL THERAPY | Facility: REHABILITATION | Age: 73
End: 2018-02-28
Attending: FAMILY MEDICINE
Payer: MEDICARE

## 2018-02-28 ENCOUNTER — OCCUPATIONAL THERAPY (OUTPATIENT)
Dept: OCCUPATIONAL THERAPY | Facility: REHABILITATION | Age: 73
End: 2018-02-28
Attending: FAMILY MEDICINE
Payer: MEDICARE

## 2018-02-28 DIAGNOSIS — Z86.73 HISTORY OF ARTERIAL ISCHEMIC STROKE: ICD-10-CM

## 2018-02-28 DIAGNOSIS — Z99.3 WHEELCHAIR DEPENDENT: ICD-10-CM

## 2018-02-28 DIAGNOSIS — Z86.73 PERSONAL HISTORY OF TRANSIENT CEREBRAL ISCHEMIA: ICD-10-CM

## 2018-02-28 DIAGNOSIS — R53.81 DEBILITY: ICD-10-CM

## 2018-02-28 PROCEDURE — 97110 THERAPEUTIC EXERCISES: CPT | Mod: KX

## 2018-02-28 PROCEDURE — 97112 NEUROMUSCULAR REEDUCATION: CPT

## 2018-02-28 PROCEDURE — 97112 NEUROMUSCULAR REEDUCATION: CPT | Mod: KX

## 2018-02-28 PROCEDURE — 97116 GAIT TRAINING THERAPY: CPT | Mod: KX

## 2018-02-28 PROCEDURE — 97032 APPL MODALITY 1+ESTIM EA 15: CPT

## 2018-02-28 NOTE — OP THERAPY DAILY TREATMENT
"  Outpatient Occupational Therapy  DAILY TREATMENT     [facility] Occupational [address]  [address]  [place] [address]  Phone:  [phone]  Fax:  [phone]    Date: 02/28/2018    Patient: Av Bob  YOB: 1945  MRN: [id number]     Time Calculation  Start time: 0100  Stop time: 0200 Time Calculation (min): 60 minutes     Chief Complaint: Extremity Weakness    Visit #: 11    SUBJECTIVE: \"I've been using the vibrator every day and it's helping\"    OBJECTIVE:  Current objective measures:   Left wrist extension 3-/5  Left digit flexion: 3+/5  Left digit extension: digits1-3 are 3/5 strength; digits 4-5 are 3-/5 strength        Therapeutic Treatments and Modalities:    1. E Stim Attended (CPT 28009)    2. Neuromuscular Re-education (CPT 06527)    Therapeutic Treatments and Modalities Summary: NMES 43 Hz x 15 minutes for left shoulder elevation, WB through forearm when stimulation not active.  Passive sustained stretch left shoulder to wrist.  AAROM for combined elbow extension/sh flexion and elbow flexion/shoulder extension with light resistance x 15 reps.  Gross wrist/digit extension a.g x 10 reps.  Placed compression garment over digits 2-5 combined with and without vibration for gross extension from relaxed state.  Sequence of digit flexion/relaxation/extension with wrist extended to neutral.  Issued garment for updated HEP with good understanding.    Time-based treatments/modalities:  Neuromusc re-ed minutes (CPT 29090): 45 minutes  E-stim attended minutes (CPT 03213): 15 minutes      ASSESSMENT:   Response to treatment: Pt making excellent progress today in response to facilitory techniques during NM re-ed with an increase in strength of muscle contractions throughout RUE, most notably in wrist/digit extension.  Pt demonstrating improved ability to perform sequence of flexion, relaxation, and extension with minimal influence of flexor spasticity.  HEP updated " with good understanding.      PLAN/RECOMMENDATIONS:   Plan for treatment: therapy treatment to continue next visit.  Planned interventions for next visit: continue with current treatment, E-stim attended (CPT 68583), neuromuscular re-education (CPT 01957) and therapeutic activities (CPT 02106)

## 2018-03-01 ENCOUNTER — NON-PROVIDER VISIT (OUTPATIENT)
Dept: WOUND CARE | Facility: MEDICAL CENTER | Age: 73
End: 2018-03-01
Attending: NURSE PRACTITIONER
Payer: MEDICARE

## 2018-03-01 ENCOUNTER — NON-PROVIDER VISIT (OUTPATIENT)
Dept: WOUND CARE | Facility: MEDICAL CENTER | Age: 73
End: 2018-03-01
Attending: INTERNAL MEDICINE
Payer: MEDICARE

## 2018-03-01 PROCEDURE — 11721 DEBRIDE NAIL 6 OR MORE: CPT

## 2018-03-01 PROCEDURE — 99213 OFFICE O/P EST LOW 20 MIN: CPT

## 2018-03-01 NOTE — WOUND TEAM
Advanced Wound Care  [facility]  [address]  [address]  [place] [address]  [phone] Fax: [phone]      Encounter Note  For Certification Period:01/24/2018 - 04/16/2018      Referring Physician: Trinidad Maguire  Primary Physician:     Dr. Mitchell Pantoja MD   Consulting Physicians:         Wound(s):S91.009s R lateral malleolus, L lateral foot - dry eschar/scab areas. Scab on L dorsal 2nd toe  Start of Care: 01/24/2018       Subjective:        HPI: Pt is a 72 year old diabetic gentleman with a hx of cardiovascular disease, past CVA with L hemiparesis who is referred by PCP for eval of necrotic areas on R lateral malleolus and L lateral foot. He also has scabbed areas on L dorsal 2nd toe and several small dry scabs on LLE. Pt is wc bound, transfers with assist.  He has PN and LOPS dez feet.              Pain: pt denies pain              Current Medications: no med changes per pt      Allergies: Diphenhydramine hcl; Lorazepam; Ciprofloxacin; and Nkda [no known drug allergy]          Objective:      Tests and Measures: Last A1c 01/15/2018 - 7.6 per EPIC. FBS today 177, yesterday 138 per spouse. Pt states it is usually <150.   Monofilament test reveals R - 1/10 sites sensate, L - 0/10 sites sensate - LOPS dez.     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 Arterial wound healing studies ordered BLE. PAR asked to schedule pt with Dr. Terry for vascular consult.    02/08/2018 - vascular study results from [facility] -   Findings   Bilateral.    Doppler waveform of the common femoral artery is of high amplitude and    triphasic.    Doppler waveforms at the ankle are brisk and biphasic.    Absent flow  within the Left VIVIENNE.     Arterial duplex scan was performed in accordance with lower extremity    arterial evaluation protocol - see separate report.    Lower Extremity   Arterial Duplex Report     Vascular Laboratory   CONCLUSIONS   Right Lower Extremity-   Normal flow from the common femoral artery extending distally to the    popliteal artery. Flow within the posterior tibial artery is brisk and    multiphasic.   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     Left Lower Extremity-   Normal flow seen from the common femoral artery extending distally to the    popliteal artery.   Area of elevated velocities seen within the mid segment of the posterior    tibial artery. Consistent with >50% stenosis.   Occlusion of the anterior tibial artery at the mid-distal segment, minimal    flow reversal seen at the level of the ankle.     ELIGIO MADRIGAL     Exam Date:     02/07/2018 14:07        Orthotic, protective, supportive devices: pt is wc bound secondary to L hemiparisis from CVA    Fall Risk Assessment (naomy all that apply with an X):             X   65 years or older                     Fall within the last 2 years, uses   X   Ambulatory devices   X   Loss of protective sensation in feet,          Use of prostethic/orthotic, years                     Presence of lower extremity/foot/toe amputation                   Taking medication that increases risk (per facility policy)  Verbal and written fall prevention info given. Pt is wc bound and transfers with assist.     Wound Characteristics                                                    Location:R lateral malleolus   Initial Evaluation  Date:01/24/2018 Encounter note:  Date: 03/01/2018   Tissue Type and %: 100% dry black scab/eschar 100% moist dark brown eschar   Periwound: intact Intact, erythema, flaky skin   Drainage: None None   Exposed structures None None visible   Wound Edges:    Closed with scab/eschar Closed with eschar   Odor: None none   S&S of Infection:   None none   Edema: None none   Sensation: PN - LOPS Neuropathy- LOPS               Measurements: Initial Evaluation  Date:01/24/2018 Encounter Note 03/01/2018   Length (cm) 0.9 1.0   Width (cm) 0.9 1.0   Depth (cm) na BAN   Area (cm2) 0.81cm2 1.0 cm2   Tract/undermine na BAN              Wound Characteristics                                                    Location:L lateral foot   Initial Evaluation  Date:01/24/2018 Encounter note:  Date: 03/01/2018   Tissue Type and %: 100% brown scab/eschar 100% re-epithelialized, resolved after scab fell off   Periwound: intact intact   Drainage: none none   Exposed structures None None visible   Wound Edges:   Closed with scab/eschar Closed    Odor: None None   S&S of Infection:   None None   Edema: None None   Sensation: PN - LOPS Neuropathy- LOPS               Measurements: Initial Evaluation  Date:01/24/2018 Encounter Note 03/01/2018/2018   Length (cm) 0.4 resolved   Width (cm) 0.4    Depth (cm) na    Area (cm2) 0.16cm2    Tract/undermine na              03/01/2018 - dry eschar area on L dorsal 2nd toes unchanged     Procedures:     Debridement : none   Cleansed with: NS/Gauze                                                                       Periwound protected with: betadine to all eschar areas   Primary dressing: dry gauze R lateral ankle and L lateral foot   Secondary Dressing: Komprex foam horseshoes secured with hypafix   Other:      Patient Education: POC discussed. Patient had arterial studies on 2/7/2018 and is scheduled for a consultation with Dr. Terry on 3/5/2018 at [facility] (this was the earliest available appointment). Patient's wife is changing the patient's dressings at home when necessary.    Previous- educated on wound status and POC. Wounds were wetter and top layer or Right lateral malleolus and top layer came off while painting with betadine. Pt has appt scheduled for  arterials studies on 2/7. Educated cg to keep wound CDI. Ok to shower, but then air dry, paint with betadine, and replace with bandaid or gauze. May lightly tape komprex to edges to allow dressing and wound to have air to help it stay dry. Ok to leave MAGDA if needed to keep dry.     Previous ed: pt instr increased protein diet, use of MVI if ok with MD; s/s infection, increased erythema and edema/fever/chills/N+V when to call MD/go to ER. Instr rational for wound care products. Instr to make appts for 2 x week. Instr to keep dressings clean and dry, shower on clinic days right before coming in, wrap and bag leg first to keep dry.  Instr pt to call and schedule arterial studies asap.  Instr foot and nail referral has been made. Pt and cg with good understanding.    Professional Collaboration: 03/01/2018 - none today    02/08/2018 - Pt referred to vascular (Dr. Terry) based on vascular study results - PAR asked to schedule pt ASAP. Called Dr. Terry and discussed case, she will look at chart.    Assessment:      Wound etiology: DFU, possible arterial disease    Wound Progress:  No significant change     Rationale for Treatment: Dry gauze to keep dry until arterial status can be determined; Komprex horseshoes to relieve pressure from areas.    Patient tolerance/compliance: Pt tolerated care well. Compliant with POC and appointments.    Complicating factors:DM, possible PVD    Need for ongoing Advanced Wound Care services:continued skilled wound care for debridement as needed, dressing management and skilled clinical observation to prevent complications and expedite healing.        Plan:      Treatment Plan and Recommendations: keep areas clean and dry until arterial status has been determined. Pt to get arterial studies asap    Diagnosis/ICD10: S91.009s R lateral malleolus, L lateral foot - dry eschar/scab areas. Scab on L dorsal 2nd toe    Procedures/CPT: nonselective debridement 76783    Frequency: 2 x  week      Treatment Goals: STG 2 Weeks  LTG 4 Weeks   Granulation Tissue: Pending vascular consultation %   Decrease Necrotic Tissue to: % %   Wound Phase:      Decrease Size by: % %   Periwound:      Decrease tracts/undermining by: % %   Decrease Pain:          At the time of each visit a thorough assessment of the patient is completed to assure the  appropriateness of our plan of care.  The dressings or modalities may need to be adapted   from the original plan to address any significant changes in the wound environment.

## 2018-03-01 NOTE — OP THERAPY DAILY TREATMENT
Outpatient Physical Therapy  DAILY TREATMENT     [facility] Physical [address]  [address]  [place] [address]  Phone:  [phone]  Fax:  [phone]    Date: 02/28/2018    Patient: Av Bob  YOB: 1945  MRN: [id number]     Time Calculation  Start time: 1400  Stop time: 1500 Time Calculation (min): 60 minutes     Chief Complaint: Impaired mobility.   Visit #: 21    SUBJECTIVE:  Patient received diabetic shoes and casted for L AFO. Patient brought exercise sheet and has completed 2-3 x daily. Reports feeling tired today.    OBJECTIVE:  Current objective measures:   Sit to stand from W/C: mod/max A   Gait with platform FWW: 25 ft Lesia with L ankle DF assist with acewrap          Therapeutic Exercises (CPT 05856):     1. Shuttle , DL 6 cords 2 x 15 , manual assist for L LE     2. Shuttle , SL 3 cords L LE with marching of R LE , 2 x 10 with manual assist for L LE     3. Shuttle , adduction squeeze with ball 10x with 5 second hold       Therapeutic Exercise Summary: With setup 25 minutes with activity     Therapeutic Treatments and Modalities:     1. Neuromuscular Re-education (CPT 14334), sit to stand from W/C at raised mat to R side x 5 cueing for increased WB on LE's and decreased pull of UE. In standing, WS L<>R with L LE activation in stance, decreasing UE assist for 5 second increments.     2. Gait Training (CPT 91436), 10 ft modA with SPC, facilitation for anterolateral WS to L, gait with platform FWW 30 ft with cueing for upright posture and WS to L side , manual facilitation for L LE activation in stance and WS     Time-based treatments/modalities:  Gait training minutes (CPT 45094): 15 minutes  Therapeutic exercise minutes (CPT 39402): 25 minutes  Neuromusc re-ed, balance, coor, post minutes (CPT 70186): 20 minutes       Pain rating before treatment: 0  Pain rating after treatment: 0    ASSESSMENT:   Response to treatment: Patient was tired during  session and required verbal cueing to maintain attention to task. Patient demonstrated difficulty incorporating L LE into WB activities requiring manual facilitation.     PLAN/RECOMMENDATIONS:   Plan for treatment: therapy treatment to continue next visit. Patient should have custom AFO to assess next session.   Planned interventions for next visit: continue with current treatment.

## 2018-03-01 NOTE — CERTIFICATION
[facility]   [address]   [address]   [place] [address]   [phone] Fax: [phone]      Foot and Nail Assessment   Initial Eval 03/01/2018      Referring Physician: Sandra SEALS  Primary Physician:  Mitchell Pantoja MD  Consulting Physicians:   Start of Care: 03/01/2018       Subjective:        HPI: Pt is a 72 year old diabetic gentleman with a hx of cardiovascular disease, past CVA with L hemiparesis who is referred by PCP for eval of necrotic areas on R lateral malleolus and L lateral foot. He also has scabbed areas on L dorsal 2nd toe and several small dry scabs on LLE. Pt is wc bound, transfers with assist.  He has PN and LOPS dez feet. He is referred for foot and nail care due to dystrophic, mycotic nails that pt is unable to care for himself any longer.    History of foot ulceration(s): Yes_x___  No_____        Location:_____________________________________________________    History of foot surgery: Yes____  No_x___    Describe:_____________________________________________________      Employed: Y ___ N _x__ Job description: __retired______________________________   Current Residence: ______home with spouse___________________________      Pain: pt c/o pain with elevation of feet at appt today - he found some relief when the angle of the treatment table was adjusted to lower his feet        Past Medical History:   Past Medical History:   Diagnosis Date   • Acute respiratory failure with hypoxia (CMS-HCC) 5/20/2017   • CAD (coronary artery disease)     GIOVANNA to RCA in '97, GIOVANNA X2 to LAD and GIOVANNA X2 to OM in '09   • Cancer (CMS-HCC)     2017; chemo lympoma   • Cataract    • Cerebrovascular accident (CVA) (CMS-[facility]) 12/30/2016    Left arm weakness  etiology of stroke not established, lymphoma discovered on MRI evaluation of stroke, L hemiparesis much worse after acute infectious illness in mid 2017, but no specific diagnosed recurrent neurological etiology, all at [facility]   • CKD (chronic kidney disease) stage 3, GFR 30-59 ml/min    • Controlled gout 2014   • Coronary atherosclerosis of native coronary artery     S/P PTCA (percutaneous transluminal coronary angioplasty), RCA, 5/1997, patent on cath 7/10/2009 at the time of interventions on his left anterior descending and circumflex coronary arteries   • Depression    • Diabetes (CMS-HCC)    • Enterococcal septicemia (CMS-HCC) 8/12/2017   • Hypertension    • Hypokalemia 2012    controlled with combination of ACE inhibitor or ARB plus spironolactone   • Hypomagnesemia 08/12/2017    etiology uncertain   • 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 Barry   • NSTEMI (non-ST elevated myocardial infarction) (CMS-HCC) 07/18/2017    complicating UTI with sepsis   • Pain    • Polyneuropathy in diabetes(357.2) 9/11/2013   • Septic shock (CMS-HCC) 5/20/2017   • Skin ulcer of calf (CMS-HCC) 2015    Right, Dr. Terry and wound care   • Stroke (CMS-HCC) 2016    left sided weakness   • Urinary bladder disorder    • Urinary incontinence    • Wound of left leg 2012    Requiring surgery and debridment, Dr. Moore       Current Medications:   Current Outpatient Prescriptions:   •  allopurinol (ZYLOPRIM) 300 MG Tab, Take 150 mg by mouth every day., Disp: , Rfl:   •  ascorbic acid (VITAMIN C) 500 MG tablet, Take 500 mg by mouth every day., Disp: , Rfl:   •  Cholecalciferol (VITAMIN D) 2000 units Cap, Take 1 Cap by mouth every evening., Disp: , Rfl:   •  insulin lispro (HUMALOG) 100 UNIT/ML Solution, Inject 10-14 Units as instructed 3 times a day before meals. 10 units every meal up to , then add 2 units for Every 50 above 150 BG, Disp: , Rfl:   •  Acetaminophen (TYLENOL PO), Take 1,250 mg by mouth 2 times a day as needed. 625 mg each tab, Disp: , Rfl:   •  NON SPECIFIED, Apply 1 Application to affected area(s) 3 times  a day as needed. Therawax, Disp: , Rfl:   •  clopidogrel (PLAVIX) 75 MG Tab, Take 1 Tab by mouth every day., Disp: 90 Tab, Rfl: 3  •  fluoxetine (PROZAC) 40 MG capsule, Take 1 Cap by mouth every day., Disp: 90 Cap, Rfl: 1  •  Insulin Glargine (TOUJEO SOLOSTAR) 300 UNIT/ML Solution Pen-injector, Inject 15 Units as instructed every evening., Disp: 3 PEN, Rfl: 6  •  magnesium oxide (MAG-OX) 400 MG Tab, Take 400 mg by mouth 3 times a day., Disp: , Rfl:   •  metoprolol (TOPROL-XL) 200 MG XL tablet, Take 1 Tab by mouth every day., Disp: 90 Tab, Rfl: 3  •  fenofibrate (TRICOR) 145 MG Tab, Take 1 Tab by mouth every day., Disp: 90 Tab, Rfl: 3  •  losartan (COZAAR) 50 MG Tab, Take 1 Tab by mouth every day., Disp: 30 Tab, Rfl: 6  •  spironolactone (ALDACTONE) 50 MG Tab, Take 1 Tab by mouth every day., Disp: 90 Tab, Rfl: 3  •  aspirin 81 MG tablet, Take 81 mg by mouth every day., Disp: , Rfl:   •  atorvastatin (LIPITOR) 40 MG Tab, Take 0.5 Tabs by mouth every day., Disp: 90 Tab, Rfl: 3  •  CENTRUM SILVER PO, Take 1 Tab by mouth every day., Disp: , Rfl:       Objective:    Tests and Measures: Last A1c 01/15/2018 - 7.6 per EPIC. FBS today 177, yesterday 138 per spouse. Pt states it is usually <150.   Monofilament test reveals R - 1/10 sites sensate, L - 0/10 sites sensate - LOPS dez.           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 Arterial wound healing studies ordered BLE. PAR asked to schedule pt with Dr. Harrison for vascular consult.    02/08/2018 - vascular study results from [facility] -   Findings   Bilateral.    Doppler waveform of the common femoral artery is of high amplitude and    triphasic.    Doppler waveforms at the  ankle are brisk and biphasic.    Absent flow within the Left VIVIENNE.     Arterial duplex scan was performed in accordance with lower extremity    arterial evaluation protocol - see separate report.    R L    Pulses not palpable  DP pulse     PT pulse     Capillary refill < 3 sec       Deformities  R L     2nd Hammer/claw toe     Hallux Valgus     Prominent Metatarsal Heads     Charcot Foot     Drop Foot     Rocker Bottom     Prior amputation:     Other:        Clinical appearance/skin  Dryness___minimal__________ Swelling____none__________ Redness__mild dependent rubor___________Temperature___cool__________  Callus______none_________________________________  Ulceration_____dry eschar patches on R lateral malleolus and L 2nd dorsal toe - see wound care note for today_______________________________     Clinical appearance/toenails  Onychomycosis___all 10, hallux dez are more affected__________________________  Ingrown toenails_____________________________  Deformities_________________________________  Other______________________________________      Orthotic, protective, supportive devices: Pt had new diabetic custom shoes and orthotics made by [employer]. He c/o several 'hotspots' after wearing them for just a couple of hours - instr pt to go back to [employer] for revision of footwear      Procedures:  Pt's feet were washed with soapy water then dried. Debris removed from between toes with gauze. Cuticle and nailbed margins were established and debris removed from around and beneath toenails with a curette. Dystrophic, onychomycotic nails (10) were trimmed with clippers, then smoothed and finished with dremel. Tea tree oil applied to nailbeds and cuticles. Feet dez were moisturized except for between toes. No nicks or cuts noted.          Patient Education: pt instr s/s infection, when to call MD/go to ER. Instr to moisturize feet and BLE daily with water based moisturizer, except between toes.Instr daily foot checks, report  any blisters/open areas and medical attention promptly. Pt instr to make an appt again for 3 months for foot care. Pt with good understanding.      Professional Collaboration: Eval sent to referring provider via [employer]. Pt has a vascular consult on 03/05/2018 with Dr. 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           Clinician Signature:_Ruba_LETICIA,SIMA,[id number]_____________________________Date____03/01/2018______________     Physician Signature:______________________________Date:__________________

## 2018-03-02 ENCOUNTER — OCCUPATIONAL THERAPY (OUTPATIENT)
Dept: OCCUPATIONAL THERAPY | Facility: REHABILITATION | Age: 73
End: 2018-03-02
Attending: FAMILY MEDICINE
Payer: MEDICARE

## 2018-03-02 ENCOUNTER — PHYSICAL THERAPY (OUTPATIENT)
Dept: PHYSICAL THERAPY | Facility: REHABILITATION | Age: 73
End: 2018-03-02
Attending: FAMILY MEDICINE
Payer: MEDICARE

## 2018-03-02 DIAGNOSIS — Z86.73 PERSONAL HISTORY OF TRANSIENT CEREBRAL ISCHEMIA: ICD-10-CM

## 2018-03-02 DIAGNOSIS — Z99.3 WHEELCHAIR DEPENDENT: ICD-10-CM

## 2018-03-02 DIAGNOSIS — R53.81 DEBILITY: ICD-10-CM

## 2018-03-02 DIAGNOSIS — Z86.73 HISTORY OF ARTERIAL ISCHEMIC STROKE: ICD-10-CM

## 2018-03-02 PROCEDURE — 97112 NEUROMUSCULAR REEDUCATION: CPT | Mod: KX

## 2018-03-02 PROCEDURE — 97116 GAIT TRAINING THERAPY: CPT | Mod: KX

## 2018-03-02 NOTE — OP THERAPY DAILY TREATMENT
Outpatient Physical Therapy  DAILY TREATMENT     [facility] [address]  [address]  [place] [address]  Phone:  [phone]  Fax:  [phone]    Date: 03/02/2018    Patient: Av Bob  YOB: 1945  MRN: [id number]     Time Calculation  Start time: 1110  Stop time: 1140 Time Calculation (min): 30 minutes     Chief Complaint: Impaired mobility   Visit #: 22    SUBJECTIVE:  Patient reports not feeling as tired as previous session this week. Supposed to receive AFO next Monday per patient     OBJECTIVE:  Current objective measures:   Sit to stand from W/C: variable min/modA          Therapeutic Exercises (CPT 70804):     1. Nu step , 5 minutes, level 2     Therapeutic Treatments and Modalities:     1. Neuromuscular Re-education (CPT 71828), sit to stand from W/C at raised mat to R side x 5 cueing for increased WB on LE's, anterior WS, and decreased pull of UE. In standing, WS L<>R with L LE activation in stance, decreasing UE assist for 5 second increments. , While in sitting dynadisc placed on seat to increase trunk activation and assess balance in tall sitting, with minimal tactile cueing for lumbar extension patient able to tolerate improve postural position, vs increased lumbar fleixon     3. Gait Training (CPT 07086), 20 ft with platform FWW, and L ankle acewrap for DF assist. cueing for upright posture and L foot placement., manual facilitation for L LE activation in stance and WS. Discussed with patient and wife in home safety to wear shoes with gait and limit distance until patient has L AFO for safe L foot positioning.    Time-based treatments/modalities:  Gait training minutes (CPT 97507): 10 minutes  Therapeutic exercise minutes (CPT 25301): 5 minutes  Neuromusc re-ed, balance, coor, post minutes (CPT 54271): 15 minutes       Pain rating before treatment: none reported   Pain rating after treatment: none reported     ASSESSMENT:   Response to  treatment: Patient will continue to benefit from L AFO for improved placement of L LE with gait. Patient continues to demonstrate decreased trunk and LE strength (L>R) contributing to difficulty with transfers. Patient is now scheduled with interim home program to increase activity during the day and sessions will drop down to 1x a week.     PLAN/RECOMMENDATIONS:   Plan for treatment: therapy treatment to continue next visit 1x a week, reassess shoes and gait with trial for increased sensory intergration for L LE.  Planned interventions for next visit: continue with current treatment.

## 2018-03-02 NOTE — OP THERAPY DAILY TREATMENT
"  Outpatient Occupational Therapy  DAILY TREATMENT     [facility] Occupational Therapy [address]  [address]  [place] [address]  Phone:  [phone]  Fax:  [phone]    Date: 03/02/2018    Patient: Av Bob  YOB: 1945  MRN: [id number]     Time Calculation  Start time: 1000  Stop time: 1100 Time Calculation (min): 60 minutes     Chief Complaint: Extremity Weakness    Visit #: 12    SUBJECTIVE: \"I've been doing the exercises\"    OBJECTIVE:  Current objective measures:   Scapular strength 2-/5  Left elbow flexion/extension 3+/5  Left wrist extension 3-/5  Left digit flexion: 3+/5  Left digit extension: digits1-3 are 3/5 strength; digits 4-5 are 3-/5 strength  Grade 2 flexor tone at wrist, Grade 1+ at digits       Therapeutic Treatments and Modalities:    1. Neuromuscular Re-education (CPT 45989)    Therapeutic Treatments and Modalities Summary: Passive sustained stretch left shoulder to digits.  Min assist transfer w/c <-> mat stand pivot to right.  Lateral WB on left forearm/elbow x 4 reps x 10 sec, WB through left wrist with elbow maintained in extension with tactile stim to increase co-contraction.  Seated with pipe-frame holding with BH for stretching of shoulders and active scapular protraction/retraction x 15 reps.   Supine for elbow extension a.g to visual target x 15, elbow flexion a.g x 15 reps, wrist extension to neutral x 15, digit extension with wrist held in neutral from flexed digits vs from relaxed state.  HEP updated with good understanding.      Time-based treatments/modalities:  Neuromusc re-ed minutes (CPT 44470): 60 minutes        ASSESSMENT:   Response to treatment: Pt continues to make progress with LUE neuromuscular status in response to both facilitory and inhibitory techniques with an increase in volitional muscle activity in all groups despite mild influence of flexor spasticity.  Pt remains motivated.  HEP updated with good " understanding.    PLAN/RECOMMENDATIONS:   Plan for treatment: therapy treatment to continue next visit.  Planned interventions for next visit: continue with current treatment, E-stim attended (CPT 29969), neuromuscular re-education (CPT 89600) and therapeutic activities (CPT 29883)

## 2018-03-05 ENCOUNTER — OFFICE VISIT (OUTPATIENT)
Dept: WOUND CARE | Facility: MEDICAL CENTER | Age: 73
End: 2018-03-05
Attending: INTERNAL MEDICINE
Payer: MEDICARE

## 2018-03-05 DIAGNOSIS — E78.2 MIXED HYPERLIPIDEMIA: ICD-10-CM

## 2018-03-05 PROCEDURE — 97602 WOUND(S) CARE NON-SELECTIVE: CPT

## 2018-03-05 PROCEDURE — 97597 DBRDMT OPN WND 1ST 20 CM/<: CPT | Performed by: SURGERY

## 2018-03-05 RX ORDER — ATORVASTATIN CALCIUM 20 MG/1
20 TABLET, FILM COATED ORAL DAILY
Qty: 90 TAB | Refills: 2 | Status: SHIPPED | OUTPATIENT
Start: 2018-03-05 | End: 2018-10-19 | Stop reason: SDUPTHER

## 2018-03-05 NOTE — WOUND TEAM
Advanced Wound Care  [facility]  [address]  [address]  [place] [address]  [phone] Fax: [phone]      Encounter Note  For Certification Period:01/24/2018 - 04/16/2018      Referring Physician: Trinidad Maguire  Primary Physician:     Dr. Mitchell Pantoja MD   Consulting Physicians:         Wound(s):S91.009s R lateral malleolus, L lateral foot - dry eschar/scab areas. Scab on L dorsal 2nd toe  Start of Care: 01/24/2018       Subjective:        HPI: Pt is a 72 year old diabetic gentleman with a hx of cardiovascular disease, past CVA with L hemiparesis who is referred by PCP for eval of necrotic areas on R lateral malleolus and L lateral foot. He also has scabbed areas on L dorsal 2nd toe and several small dry scabs on LLE. Pt is wc bound, transfers with assist.  He has PN and LOPS dez feet.              Pain: pt denies pain              Current Medications: no med changes per pt      Allergies: Diphenhydramine hcl; Lorazepam; Ciprofloxacin; and Nkda [no known drug allergy]          Objective:      Tests and Measures: Last A1c 01/15/2018 - 7.6 per EPIC. FBS today 177, yesterday 138 per spouse. Pt states it is usually <150.   Monofilament test reveals R - 1/10 sites sensate, L - 0/10 sites sensate - LOPS dez.     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 Arterial wound healing studies ordered BLE. PAR asked to schedule pt with Dr. Terry for vascular consult.    02/08/2018 - vascular study results from [facility] -   Findings   Bilateral.    Doppler waveform of the common femoral artery is of high amplitude and    triphasic.    Doppler waveforms at the ankle are brisk and biphasic.    Absent flow  within the Left VIVIENNE.     Arterial duplex scan was performed in accordance with lower extremity    arterial evaluation protocol - see separate report.    Lower Extremity   Arterial Duplex Report     Vascular Laboratory   CONCLUSIONS   Right Lower Extremity-   Normal flow from the common femoral artery extending distally to the    popliteal artery. Flow within the posterior tibial artery is brisk and    multiphasic.   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     Left Lower Extremity-   Normal flow seen from the common femoral artery extending distally to the    popliteal artery.   Area of elevated velocities seen within the mid segment of the posterior    tibial artery. Consistent with >50% stenosis.   Occlusion of the anterior tibial artery at the mid-distal segment, minimal    flow reversal seen at the level of the ankle.     ELIGIO MADRIGAL     Exam Date:     02/07/2018 14:07        Orthotic, protective, supportive devices: pt is wc bound secondary to L hemiparisis from CVA    Fall Risk Assessment (naomy all that apply with an X):             X   65 years or older                     Fall within the last 2 years, uses   X   Ambulatory devices   X   Loss of protective sensation in feet,          Use of prostethic/orthotic, years                     Presence of lower extremity/foot/toe amputation                   Taking medication that increases risk (per facility policy)  Verbal and written fall prevention info given. Pt is wc bound and transfers with assist.     Wound Characteristics                                                    Location:R lateral malleolus   Initial Evaluation  Date:01/24/2018 Encounter Date: 03/05/2018   Tissue Type and %: 100% dry black scab/eschar 100% dry yellow   Periwound: intact Mild erythema   Drainage: None None   Exposed structures None BAN   Wound Edges:   Closed with scab/eschar Closed   Odor: None None    S&S of Infection:   None None   Edema: None None   Sensation: PN - LOPS LOPS               Measurements: Initial Evaluation  Date:01/24/2018 Encounter Note 03/05/2018   Length (cm) 0.9 0.4   Width (cm) 0.9 0.3   Depth (cm) na BAN   Area (cm2) 0.81cm2 0.12 cm2   Tract/undermine na BAN          Wound Characteristics                                                    Location:   L dorsal 2nd toe Initial Evaluation  Date:  03/05/2018   Tissue Type and %: 100% dry yellow/eschar   Periwound: Mild erythema   Drainage: None   Exposed structures BAN   Wound Edges:   Closed   Odor: None   S&S of Infection:   None   Edema: Local   Sensation: LOPS               Measurements: L dorsal 2nd toe   Initial Evaluation  Date: 03/05/2018   Length (cm) 0.7   Width (cm) 0.6   Depth (cm) BAN   Area (cm2) 0.42 cm2   Tract/undermine BAN              Procedures:     Debridement : R lateral malleolus scab removed by Dr. Terry.   Cleansed with: NS/Gauze                                                                       Periwound protected with:    Primary dressing: Medihoney gel to both wounds per Dr. Terry   Secondary Dressing: Nonadhesive foams secured with hypafix   Other: Komprex foam horseshoe secured with hypafix on R lateral malleolus     Patient Education: Dr. Terry reviewed vascular study results with pt and wife. No further procedure or intervention at this time. R lateral malleolus wound scab removed by Dr. Terry and wound to be dressed with medihoney gel per Dr. Terry. Dr. Terry provided wife with a sample of medihoney gel to change at home between [facility] visits. Pt and wife is known to Dr. Terry, and wife has changed pt's dressing in past as well. Reviewed s/s of infection and when to go to ER, pt and wife verbalizes understanding.    Professional Collaboration: Dr. Terry discussed arterial statusand POC.    Assessment:      Wound etiology: DFU, possible arterial disease    Wound Progress:  R malleolus  scab removed by Dr. Terry, dry yellow wound bed.    Rationale for Treatment: Medihoney to provide moist wound environment, and facilitate autolytic debridement; Komprex horseshoes to relieve pressure from areas.    Patient tolerance/compliance: Pt tolerated care well. Compliant with POC and appointments.    Complicating factors: DM, possible PVD    Need for ongoing Advanced Wound Care services:continued skilled wound care for debridement as needed, dressing management and skilled clinical observation to prevent complications and expedite healing.        Plan:      Treatment Plan and Recommendations: keep areas clean and dry until arterial status has been determined. Pt to get arterial studies asap    Diagnosis/ICD10: S91.009s R lateral malleolus, L lateral foot - dry eschar/scab areas. Scab on L dorsal 2nd toe    Procedures/CPT: nonselective debridement 40165    Frequency: 1 x week      Treatment Goals: STG 2 Weeks  LTG 4 Weeks   Granulation Tissue: Pending vascular consultation %   Decrease Necrotic Tissue to: % %   Wound Phase:      Decrease Size by: % %   Periwound:      Decrease tracts/undermining by: % %   Decrease Pain:          At the time of each visit a thorough assessment of the patient is completed to assure the  appropriateness of our plan of care.  The dressings or modalities may need to be adapted   from the original plan to address any significant changes in the wound environment.

## 2018-03-06 ENCOUNTER — OCCUPATIONAL THERAPY (OUTPATIENT)
Dept: OCCUPATIONAL THERAPY | Facility: REHABILITATION | Age: 73
End: 2018-03-06
Attending: FAMILY MEDICINE
Payer: MEDICARE

## 2018-03-06 DIAGNOSIS — Z86.73 PERSONAL HISTORY OF TRANSIENT CEREBRAL ISCHEMIA: ICD-10-CM

## 2018-03-06 PROCEDURE — 97530 THERAPEUTIC ACTIVITIES: CPT | Mod: KX

## 2018-03-06 PROCEDURE — 97112 NEUROMUSCULAR REEDUCATION: CPT

## 2018-03-06 NOTE — PROGRESS NOTES
Wound Consult Note:    Kena Teryr  Date & Time note created:    3/6/2018   12:27 PM     Referred by: FABIENNE Dooley    Patient ID:   Name:             Av Bob   YOB: 1945  Age:                 72 y.o.  male   MRN:               [id number]                                                             Reason for Consult:      Vascular concerns    History of Present Illness:    Mr. Bob presents with wounds on his feet.  There is concern that his arterial perfusion is insufficient to heal the right lateral malleolus wound and a wound to the dorsum of the toe on his left foot.  This nice man and his wife are known to me from a prior left heel wound that was treated by Dr. Moore with great success using Integra.  He has since been hospitalized and most recently suffered nephrolithiasis requiring lithotripsy.  He was treated for A stroke affecting the left side of his body as well as lymphoma.  The last year in the past have been fraught with multiple medical problems and he sees me with trepidation.    Review of Systems:      Constitutional: Denies fevers, admits to weight loss  Eyes: Denies changes in vision, no eye pain  Ears/Nose/Throat/Mouth: Denies nasal congestion or sore throat   Cardiovascular: Denies chest pain or  palpitations   Respiratory: Denies shortness of breath , Denies cough  Gastrointestinal/Hepatic: Denies abdominal pain, nausea, vomiting, diarrhea, constipation or GI bleeding   Genitourinary: Denies dysuria or frequency  Musculoskeletal/Rheum: Denies  joint pain and swelling, no edema, does not walk far or fast enough to experience claudication  Skin: no rash, no history of open wounds  Neurological: Denies headache, confusion, memory loss or focal weakness/parasthesias  Psychiatric: Denies mood disorder   Endocrine: Denies thyroid problems  Heme/Oncology/Lymph Nodes: Recent enlarged lymph nodes, denies brusing or known bleeding disorder  All other systems  were reviewed and are negative (AMA/CMS criteria)                Past Medical History:   Past Medical History:   Diagnosis Date   • Acute respiratory failure with hypoxia (CMS-HCC) 5/20/2017   • CAD (coronary artery disease)     GIOVANNA to RCA in '97, GIOVANNA X2 to LAD and GIOVANNA X2 to OM in '09   • Cancer (CMS-HCC)     2017; chemo lympoma   • Cataract    • Cerebrovascular accident (CVA) (CMS-HCC) 12/30/2016    Left arm weakness  etiology of stroke not established, lymphoma discovered on MRI evaluation of stroke, L hemiparesis much worse after acute infectious illness in mid 2017, but no specific diagnosed recurrent neurological etiology, all at [facility]   • CKD (chronic kidney disease) stage 3, GFR 30-59 ml/min    • Controlled gout 2014   • Coronary atherosclerosis of native coronary artery     S/P PTCA (percutaneous transluminal coronary angioplasty), RCA, 5/1997, patent on cath 7/10/2009 at the time of interventions on his left anterior descending and circumflex coronary arteries   • Depression    • Diabetes (CMS-HCC)    • Enterococcal septicemia (CMS-HCC) 8/12/2017   • Hypertension    • Hypokalemia 2012    controlled with combination of ACE inhibitor or ARB plus spironolactone   • Hypomagnesemia 08/12/2017    etiology uncertain   • 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 Barry   • NSTEMI (non-ST elevated myocardial infarction) (CMS-HCC) 07/18/2017    complicating UTI with sepsis   • Pain    • Polyneuropathy in diabetes(357.2) 9/11/2013   • Septic shock (CMS-HCC) 5/20/2017   • Skin ulcer of calf (CMS-HCC) 2015    Right, Dr. Terry and wound care   • Stroke (CMS-HCC) 2016    left sided weakness   • Urinary bladder disorder    • Urinary incontinence    • Wound of left leg 2012    Requiring surgery and debridment, Dr. Moore       Past Surgical History:  Past Surgical History:   Procedure  Laterality Date   • CYSTOSCOPY STENT PLACEMENT Left 2/12/2018    Procedure: CYSTOSCOPY  ;  Surgeon: Rey Barry M.D.;  Location: SURGERY [facility];  Service: Urology   • URETEROSCOPY Left 2/12/2018    Procedure: URETEROSCOPY- FLEXIBLE  ;  Surgeon: Rey Barry M.D.;  Location: SURGERY [facility];  Service: Urology   • LASERTRIPSY Left 2/12/2018    Procedure: LASERTRIPSY - LITHO  ;  Surgeon: Rey Barry M.D.;  Location: SURGERY [facility];  Service: Urology   • WOUND CLOSURE GENERAL  4/3/2012    Performed by GERHARD GILBERT at SURGERY SAME DAY [facility]   • DAGOBERTO BY LAPAROSCOPY  1998   • ANGIOPLASTY  1997    RCA followed by other stents as noted above.    • CARDIAC CATH     • CATARACT EXTRACTION WITH IOL      bilateral   • LITHOTRIPSY     • TONSILLECTOMY AND ADENOIDECTOMY         Current Outpatient Medications:  Current Outpatient Prescriptions   Medication Sig Dispense Refill   • atorvastatin (LIPITOR) 20 MG Tab Take 1 Tab by mouth every day. 90 Tab 2   • allopurinol (ZYLOPRIM) 300 MG Tab Take 150 mg by mouth every day.     • ascorbic acid (VITAMIN C) 500 MG tablet Take 500 mg by mouth every day.     • Cholecalciferol (VITAMIN D) 2000 units Cap Take 1 Cap by mouth every evening.     • insulin lispro (HUMALOG) 100 UNIT/ML Solution Inject 10-14 Units as instructed 3 times a day before meals. 10 units every meal up to , then add 2 units for Every 50 above 150 BG     • Acetaminophen (TYLENOL PO) Take 1,250 mg by mouth 2 times a day as needed. 625 mg each tab     • NON SPECIFIED Apply 1 Application to affected area(s) 3 times a day as needed. Therawax     • clopidogrel (PLAVIX) 75 MG Tab Take 1 Tab by mouth every day. 90 Tab 3   • fluoxetine (PROZAC) 40 MG capsule Take 1 Cap by mouth every day. 90 Cap 1   • Insulin Glargine (TOUJEO SOLOSTAR) 300 UNIT/ML Solution Pen-injector Inject 15 Units as instructed every evening. 3 PEN 6   • magnesium oxide (MAG-OX) 400 MG Tab Take 400 mg by mouth 3  times a day.     • metoprolol (TOPROL-XL) 200 MG XL tablet Take 1 Tab by mouth every day. 90 Tab 3   • fenofibrate (TRICOR) 145 MG Tab Take 1 Tab by mouth every day. 90 Tab 3   • losartan (COZAAR) 50 MG Tab Take 1 Tab by mouth every day. 30 Tab 6   • spironolactone (ALDACTONE) 50 MG Tab Take 1 Tab by mouth every day. 90 Tab 3   • aspirin 81 MG tablet Take 81 mg by mouth every day.     • CENTRUM SILVER PO Take 1 Tab by mouth every day.       No current facility-administered medications for this visit.        Medication Allergy:  Allergies   Allergen Reactions   • Diphenhydramine Hcl Anxiety     Pt is able to tolerate  Mg benadryl with less anxiety   • Lorazepam Unspecified     Disorientation   • Ciprofloxacin      Rash,stomach ache       Family History:  Family History   Problem Relation Age of Onset   • Heart Disease Father      CAD   • Diabetes Father    • Cancer Mother    • Psychiatry Mother      Depression   • Depression Mother    • Kidney stones Brother    • Heart Disease Brother    • Psychiatry Brother      Depression   • Depression Brother    • Suicide Attempts Other    • Psychiatry Other      autism       Social History:  Social History     Social History   • Marital status:      Spouse name: N/A   • Number of children: N/A   • Years of education: N/A     Occupational History   • Not on file.     Social History Main Topics   • Smoking status: Never Smoker   • Smokeless tobacco: Never Used   • Alcohol use No   • Drug use: No   • Sexual activity: Not Currently     Partners: Female     Other Topics Concern   • Not on file     Social History Narrative   • No narrative on file         Physical Exam:  General Appearance: Debilitated elderly man with a remarkably good attitude    Constitutional:   Well developed, Well nourished, No acute distress  HENMT:  Normocephalic, Atraumatic, Oropharynx moist mucous membranes, No oral exudates, Nose normal.  No thyromegaly.  Eyes:  EOMI, Conjunctiva normal, No  discharge.  Neck:  Normal range of motion, No cervical tenderness,  no JVD.  Cardiovascular:  Normal heart rate, Normal rhythm, No murmur.   Extremities with strong doppler signals  Lungs:  Breath sounds clear to auscultation bilaterally,  no crackles, no wheezing.   Abdomen: Bowel sounds normal, Soft, No tenderness   Skin: Warm, Dry, No erythema, No rash, no induration.  SMall eschar over the lateral right malleolus, measuring no more than 5mm.    Superficial eschar over the dorsum of the third toe, dry without cellulitis  Neurologic: Alert & oriented x 3, No focal deficits noted, cranial nerves II through X are grossly intact.  Psychiatric: Affect normal, Judgment normal, Mood normal.    Imaging/Procedures Review:    Waveform            Systolic BPs (mmHg)                              160           Brachial   Triphasic                                Common Femoral   Biphasic                   182           Posterior Tibial   Biphasic                   178           Dorsalis Pedis                                            Peroneal                              1.14          PADMINI                                            TBI                          LEFT   Waveform        Systolic BPs (mmHg)                                            Brachial   Triphasic                                Common Femoral   Biphasic                   170           Posterior Tibial   Absent                     0             Dorsalis Pedis                                            Peroneal                              1.06          PADMINI                                            TBI    Lower extremity arterial duplex imaging  CONCLUSIONS   Right Lower Extremity-   Normal flow from the common femoral artery extending distally to the    popliteal artery. Flow within the posterior tibial artery is brisk and    multiphasic.   Occlusion of the anterior tibial artery at the prox-mid level with    reconstitution of flow seen at the distal  level.     Left Lower Extremity-   Normal flow seen from the common femoral artery extending distally to the    popliteal artery.   Area of elevated velocities seen within the mid segment of the posterior    tibial artery. Consistent with >50% stenosis.   Occlusion of the anterior tibial artery at the mid-distal segment, minimal    flow reversal seen at the level of the ankle.      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, without long-term current use of insulin (CMS-HCC) 12/30/2016     Priority: Medium   • Atherosclerosis of native coronary artery of native heart      Priority: Medium   • Mixed hyperlipidemia 12/13/2011     Priority: Medium   • Benign hypertensive heart disease without heart failure 12/13/2011     Priority: Medium   • Left hemiparesis (CMS-HCC) 12/27/2017     Priority: Low   • Diabetic nephropathy associated with type 2 diabetes mellitus (CMS-HCC) 11/30/2017     Priority: Low   • Psychophysiological insomnia 11/21/2017     Priority: Low   • Severe episode of recurrent major depressive disorder, without psychotic features (CMS-HCC) 11/07/2017     Priority: Low   • Wheelchair dependent 11/07/2017     Priority: Low   • Obstruction of left ureteropelvic junction due to stone 07/18/2017     Priority: Low   • Normocytic hypochromic anemia 05/20/2017     Priority: Low   • Diffuse large cell non-Hodgkin's lymphoma (CMS-HCC) 05/08/2017     Priority: Low   • CKD (chronic kidney disease) stage 3, GFR 30-59 ml/min 08/25/2016     Priority: Low   • Idiopathic chronic gout of multiple sites without tophus 01/28/2014     Priority: Low   • Diabetic polyneuropathy (CMS-HCC) 09/11/2013     Priority: Low   •  Depression 01/30/2018   • Acute kidney injury (CMS-HCC) 01/24/2018   • Acute UTI 01/24/2018   • Microalbuminuria due to type 2 diabetes mellitus (CMS-HCC) 01/11/2018     Therapy:  I performed debridement of the right lateral malleolus with sharp removal of a 5 mm piece of eschar.  This was loosely attached and easily removed with a 15 blade.  We then applied metal honey and a small amount to the lateral malleolus.  I did not perform debridement to the left toe.    Impression\plan: Mister Bob presents after multiple medical problems and prolonged hospitalizations with imperfect perfusion through the tibial arteries.  The noninvasive study suggests occlusion of the bilateral anterior tibial arteries and the right lateral ankle ulcer is probably a pressure sore from immobility and prolonged hospitalization.  It has not worsened with his wife diligent attention and I think is likely to heal with regular wound care.  The dorsal toe wound may also be a pressure wound or trauma and, before pursuing arteriography and yet another in a long line of procedures, I like to see what regular wound care will afford.  I recommend the use of medicine any and have given his wife a small sample tube.  Both myself and the RN who assisted the patient and his wife agreed to weekly appointments in the wound care with interval dressing changes by the wife at home.  We can consider arterial intervention the wounds deteriorate or do not progress.  However with normal perfusion down to the popliteal arteries and only left posterior tibial artery stenosis, the chance that substantial benefit from tibial atherectomy is small.      Kena Terry M.D.

## 2018-03-06 NOTE — OP THERAPY DAILY TREATMENT
"  Outpatient Occupational Therapy  DAILY TREATMENT     [facility] Occupational [address].  [address]  [place] [address]  Phone:  [phone]  Fax:  [phone]    Date: 03/06/2018    Patient: Av Bob  YOB: 1945  MRN: [id number]     Time Calculation  Start time: 0130  Stop time: 0230 Time Calculation (min): 60 minutes     Chief Complaint: Extremity Weakness    Visit #: 13    SUBJECTIVE: \"I've been using the vibrator more and can open my hand more, I can release my hand off the transfer pole more easily\"    OBJECTIVE:  Current objective measures:  Shoulder ff: trace activity  Scapular strength 2-/5  Left elbow flexion/extension 3+/5  Left wrist extension 3-/5  Left digit flexion: 3+/5  Left digit extension: digits1-3 are 3/5 strength; digits 4-5 are 3-/5 strength  Grade 2 flexor tone at wrist, Grade 1+ at digits       Therapeutic Treatments and Modalities:    1. Neuromuscular Re-education (CPT 27500)    2. Therapeutic Activities (CPT 74272)    Therapeutic Treatments and Modalities Summary: Passive sustained stretch left forearm to digits using inhibitory techniques, able to achieve full supination passively, wrist to 30 degrees of extension limited by pain.  Bilateral active supination to neutral x 10 reps with 5 second hold and composite fists.  Bilateral active digit flex/ext with palm down 3 reps,  g.e position forearm in neutral for digit flex/relax/extend sequence x 10 reps.  Grasp of 5 beanbags and cone on table with LH to place on chair on left side.  Grasp of cone with LH, flex/extend elbow x 4 and release upon last extension x 6 reps, min assist to fully position cone in hand.  Left shoulder PROM to 120 followed by AAROM with trace deltoid activity.  HEP updated with good understanding.    Time-based treatments/modalities:  Therapeutic activity minutes (CPT 75442): 30 minutes  Neuromusc re-ed minutes (CPT 20593): 30 minutes        ASSESSMENT:   Response " to treatment: Pt continues to make steady progress with LUE strength, ROM, and motor control in response to NM re-ed incorporating both inhibitory and facilitory techniques as well as engaging in functional activity re-training.  Pt high motivated.  HEP updated with good understanding.    PLAN/RECOMMENDATIONS:   Plan for treatment: therapy treatment to continue next visit.  Planned interventions for next visit: continue with current treatment, manual therapy (CPT 64978), neuromuscular re-education (CPT 35061) and therapeutic activities (CPT 40543)

## 2018-03-08 ENCOUNTER — HOSPITAL ENCOUNTER (OUTPATIENT)
Dept: LAB | Facility: MEDICAL CENTER | Age: 73
End: 2018-03-08
Attending: INTERNAL MEDICINE
Payer: MEDICARE

## 2018-03-08 ENCOUNTER — PHYSICAL THERAPY (OUTPATIENT)
Dept: PHYSICAL THERAPY | Facility: REHABILITATION | Age: 73
End: 2018-03-08
Attending: FAMILY MEDICINE
Payer: MEDICARE

## 2018-03-08 DIAGNOSIS — N18.30 CKD (CHRONIC KIDNEY DISEASE), STAGE III (HCC): ICD-10-CM

## 2018-03-08 DIAGNOSIS — E55.9 VITAMIN D DEFICIENCY: ICD-10-CM

## 2018-03-08 DIAGNOSIS — N20.0 NEPHROLITHIASIS: ICD-10-CM

## 2018-03-08 DIAGNOSIS — Z99.3 WHEELCHAIR DEPENDENT: ICD-10-CM

## 2018-03-08 DIAGNOSIS — Z86.73 HISTORY OF ARTERIAL ISCHEMIC STROKE: ICD-10-CM

## 2018-03-08 DIAGNOSIS — R53.81 DEBILITY: ICD-10-CM

## 2018-03-08 LAB
25(OH)D3 SERPL-MCNC: 19 NG/ML (ref 30–100)
ANION GAP SERPL CALC-SCNC: 8 MMOL/L (ref 0–11.9)
APPEARANCE UR: ABNORMAL
BACTERIA #/AREA URNS HPF: ABNORMAL /HPF
BILIRUB UR QL STRIP.AUTO: NEGATIVE
BUN SERPL-MCNC: 35 MG/DL (ref 8–22)
CALCIUM SERPL-MCNC: 9.4 MG/DL (ref 8.5–10.5)
CHLORIDE SERPL-SCNC: 106 MMOL/L (ref 96–112)
CO2 SERPL-SCNC: 23 MMOL/L (ref 20–33)
COLOR UR: COLORLESS
CREAT SERPL-MCNC: 1.81 MG/DL (ref 0.5–1.4)
CREAT UR-MCNC: 62.1 MG/DL
EPI CELLS #/AREA URNS HPF: ABNORMAL /HPF
GLUCOSE SERPL-MCNC: 162 MG/DL (ref 65–99)
GLUCOSE UR STRIP.AUTO-MCNC: NEGATIVE MG/DL
KETONES UR STRIP.AUTO-MCNC: NEGATIVE MG/DL
LEUKOCYTE ESTERASE UR QL STRIP.AUTO: ABNORMAL
MICRO URNS: ABNORMAL
MICROALBUMIN UR-MCNC: 18.6 MG/DL
MICROALBUMIN/CREAT UR: 300 MG/G (ref 0–30)
NITRITE UR QL STRIP.AUTO: NEGATIVE
PH UR STRIP.AUTO: 6 [PH]
POTASSIUM SERPL-SCNC: 4.1 MMOL/L (ref 3.6–5.5)
PROT UR QL STRIP: 100 MG/DL
PTH-INTACT SERPL-MCNC: 16.6 PG/ML (ref 14–72)
RBC # URNS HPF: ABNORMAL /HPF
RBC UR QL AUTO: ABNORMAL
SODIUM SERPL-SCNC: 137 MMOL/L (ref 135–145)
SP GR UR STRIP.AUTO: 1.01
WBC #/AREA URNS HPF: ABNORMAL /HPF

## 2018-03-08 PROCEDURE — 36415 COLL VENOUS BLD VENIPUNCTURE: CPT

## 2018-03-08 PROCEDURE — 82570 ASSAY OF URINE CREATININE: CPT

## 2018-03-08 PROCEDURE — 80048 BASIC METABOLIC PNL TOTAL CA: CPT

## 2018-03-08 PROCEDURE — 82043 UR ALBUMIN QUANTITATIVE: CPT

## 2018-03-08 PROCEDURE — 81001 URINALYSIS AUTO W/SCOPE: CPT

## 2018-03-08 PROCEDURE — 83970 ASSAY OF PARATHORMONE: CPT

## 2018-03-08 PROCEDURE — 97116 GAIT TRAINING THERAPY: CPT | Mod: KX

## 2018-03-08 PROCEDURE — 97112 NEUROMUSCULAR REEDUCATION: CPT | Mod: KX

## 2018-03-08 PROCEDURE — 97110 THERAPEUTIC EXERCISES: CPT | Mod: KX

## 2018-03-08 PROCEDURE — 82306 VITAMIN D 25 HYDROXY: CPT

## 2018-03-13 ENCOUNTER — OCCUPATIONAL THERAPY (OUTPATIENT)
Dept: OCCUPATIONAL THERAPY | Facility: REHABILITATION | Age: 73
End: 2018-03-13
Attending: FAMILY MEDICINE
Payer: MEDICARE

## 2018-03-13 ENCOUNTER — TELEPHONE (OUTPATIENT)
Dept: ENDOCRINOLOGY | Facility: MEDICAL CENTER | Age: 73
End: 2018-03-13

## 2018-03-13 DIAGNOSIS — Z86.73 PERSONAL HISTORY OF TRANSIENT CEREBRAL ISCHEMIA: ICD-10-CM

## 2018-03-13 DIAGNOSIS — Z79.4 TYPE 2 DIABETES MELLITUS WITH HYPERGLYCEMIA, WITH LONG-TERM CURRENT USE OF INSULIN (HCC): ICD-10-CM

## 2018-03-13 DIAGNOSIS — E11.65 TYPE 2 DIABETES MELLITUS WITH HYPERGLYCEMIA, WITH LONG-TERM CURRENT USE OF INSULIN (HCC): ICD-10-CM

## 2018-03-13 PROCEDURE — 97032 APPL MODALITY 1+ESTIM EA 15: CPT | Mod: KX

## 2018-03-13 PROCEDURE — 97112 NEUROMUSCULAR REEDUCATION: CPT

## 2018-03-13 NOTE — OP THERAPY DAILY TREATMENT
"  Outpatient Occupational Therapy  DAILY TREATMENT     [facility] Occupational [address].  [address]  [place] [address]  Phone:  [phone]  Fax:  [phone]    Date: 03/13/2018    Patient: Av Bob  YOB: 1945  MRN: [id number]     Time Calculation  Start time: 0830  Stop time: 0930 Time Calculation (min): 60 minutes     Chief Complaint: Extremity Weakness    Visit #: 14    SUBJECTIVE: \"I'm doing my exercises\"    OBJECTIVE:  Current objective measures:   Shoulder ff: 2-/5  Scapular strength 2-/5  Left elbow flexion/extension 3+/5  Left wrist extension 3-/5  Left digit flexion: 3+/5  Left digit extension: digits1-3 are 3/5 strength; digits 4-5 are 3-/5 strength  Grade 2 flexor tone at wrist, Grade 1+ at digits        Therapeutic Treatments and Modalities:    1. E Stim Attended (CPT 77285)    2. Neuromuscular Re-education (CPT 80472)    Therapeutic Treatments and Modalities Summary: NMES 25 Hz x 15 minutes for left deltoids posterior and middle, WB through forearm when stimulation not active.  Supervised to doff sweatshirt, min assist to don using eric-techniques.  Min assist transfer w/c <-> mat squat pivot to right side.  Min assist supine to sit.  Supine on mat: passive sustained stretch left shoulder to wrist as tolerated, gross wrist/digit extension a.g x 10 reps, left elbow extension a.g to visual target with focus on dec speed during eccentric contractions, elbow flexion from full extension on mat to chin with cues to incorporate supination, shoulder place and hold exercise at 90 degrees ff (able to maintain position for 5 seconds), active shoulder ff to visual target 5 reps x 2 sets (able to achieve 30 degrees of active ff).  Updated HEP with good understanding, discussed with wife as well.      Time-based treatments/modalities:  Neuromusc re-ed minutes (CPT 75508): 45 minutes  E-stim attended minutes (CPT 07702): 15 minutes   ASSESSMENT:   Response to " treatment: Pt continues to make slow but steady gains with LUE neuromuscular status, most notably during volitional shoulder ff in supine in response to NMES and facilitory techniques.  Pt incorporating LUE more consistently as an assist during activities which require bilateral integration. HEP updated with good understanding.    PLAN/RECOMMENDATIONS:   Plan for treatment: therapy treatment to continue next visit.  Planned interventions for next visit: continue with current treatment, E-stim attended (CPT 47303), neuromuscular re-education (CPT 74201), self care ADL training (CPT 16905) and therapeutic activities (CPT 81847)

## 2018-03-15 ENCOUNTER — OFFICE VISIT (OUTPATIENT)
Dept: NEPHROLOGY | Facility: MEDICAL CENTER | Age: 73
End: 2018-03-15
Payer: MEDICARE

## 2018-03-15 ENCOUNTER — NON-PROVIDER VISIT (OUTPATIENT)
Dept: WOUND CARE | Facility: MEDICAL CENTER | Age: 73
End: 2018-03-15
Attending: INTERNAL MEDICINE
Payer: MEDICARE

## 2018-03-15 VITALS
HEIGHT: 72 IN | RESPIRATION RATE: 14 BRPM | SYSTOLIC BLOOD PRESSURE: 124 MMHG | TEMPERATURE: 97.6 F | DIASTOLIC BLOOD PRESSURE: 76 MMHG | HEART RATE: 68 BPM | WEIGHT: 192 LBS | OXYGEN SATURATION: 99 % | BODY MASS INDEX: 26.01 KG/M2

## 2018-03-15 DIAGNOSIS — N18.30 CKD (CHRONIC KIDNEY DISEASE) STAGE 3, GFR 30-59 ML/MIN (HCC): ICD-10-CM

## 2018-03-15 DIAGNOSIS — N39.0 URINARY TRACT INFECTION WITHOUT HEMATURIA, SITE UNSPECIFIED: ICD-10-CM

## 2018-03-15 DIAGNOSIS — I10 ESSENTIAL HYPERTENSION, BENIGN: ICD-10-CM

## 2018-03-15 DIAGNOSIS — E11.29 MICROALBUMINURIA DUE TO TYPE 2 DIABETES MELLITUS (HCC): ICD-10-CM

## 2018-03-15 DIAGNOSIS — E55.9 VITAMIN D DEFICIENCY: ICD-10-CM

## 2018-03-15 DIAGNOSIS — R80.9 MICROALBUMINURIA DUE TO TYPE 2 DIABETES MELLITUS (HCC): ICD-10-CM

## 2018-03-15 DIAGNOSIS — N20.0 NEPHROLITHIASIS: ICD-10-CM

## 2018-03-15 PROCEDURE — 97597 DBRDMT OPN WND 1ST 20 CM/<: CPT

## 2018-03-15 PROCEDURE — 99214 OFFICE O/P EST MOD 30 MIN: CPT | Performed by: INTERNAL MEDICINE

## 2018-03-15 ASSESSMENT — ENCOUNTER SYMPTOMS
WHEEZING: 0
ORTHOPNEA: 0
SHORTNESS OF BREATH: 0
HEMOPTYSIS: 0
CHILLS: 0
NAUSEA: 0
WEIGHT LOSS: 0
DIARRHEA: 0
PALPITATIONS: 0
HEARTBURN: 0
VOMITING: 0
COUGH: 0
EYES NEGATIVE: 1
FEVER: 0
FLANK PAIN: 0
ABDOMINAL PAIN: 0

## 2018-03-15 NOTE — WOUND TEAM
Advanced Wound Care  [facility]  [address]  [address]  [place] [address]  [phone] Fax: [phone]      Encounter Note  For Certification Period:01/24/2018 - 04/16/2018      Referring Physician: Trinidad Maguire  Primary Physician:     Dr. Mitchell Pantoja MD   Consulting Physicians:         Wound(s):S91.009s R lateral malleolus, L lateral foot - dry eschar/scab areas. Scab on L dorsal 2nd toe  Start of Care: 01/24/2018       Subjective:        HPI: Pt is a 72 year old diabetic gentleman with a hx of cardiovascular disease, past CVA with L hemiparesis who is referred by PCP for eval of necrotic areas on R lateral malleolus and L lateral foot. He also has scabbed areas on L dorsal 2nd toe and several small dry scabs on LLE. Pt is wc bound, transfers with assist.  He has PN and LOPS dez feet.              Pain: pt denies pain              Current Medications: no med changes per pt      Allergies: Diphenhydramine hcl; Lorazepam; Ciprofloxacin; and Nkda [no known drug allergy]          Objective:      Tests and Measures: Last A1c 01/15/2018 - 7.6 per EPIC. FBS today 177, yesterday 138 per spouse. Pt states it is usually <150.   Monofilament test reveals R - 1/10 sites sensate, L - 0/10 sites sensate - LOPS dez.     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 Arterial wound healing studies ordered BLE. PAR asked to schedule pt with Dr. Terry for vascular consult.    02/08/2018 - vascular study results from [facility] -   Findings   Bilateral.    Doppler waveform of the common femoral artery is of high amplitude and    triphasic.    Doppler waveforms at the ankle are brisk and biphasic.    Absent flow  within the Left VIVIENNE.     Arterial duplex scan was performed in accordance with lower extremity    arterial evaluation protocol - see separate report.    Lower Extremity   Arterial Duplex Report     Vascular Laboratory   CONCLUSIONS   Right Lower Extremity-   Normal flow from the common femoral artery extending distally to the    popliteal artery. Flow within the posterior tibial artery is brisk and    multiphasic.   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     Left Lower Extremity-   Normal flow seen from the common femoral artery extending distally to the    popliteal artery.   Area of elevated velocities seen within the mid segment of the posterior    tibial artery. Consistent with >50% stenosis.   Occlusion of the anterior tibial artery at the mid-distal segment, minimal    flow reversal seen at the level of the ankle.     ELIGIO MADRIGAL     Exam Date:     02/07/2018 14:07        Orthotic, protective, supportive devices: pt is wc bound secondary to L hemiparisis from CVA    Fall Risk Assessment (naomy all that apply with an X):             X   65 years or older                     Fall within the last 2 years, uses   X   Ambulatory devices   X   Loss of protective sensation in feet,          Use of prostethic/orthotic, years                     Presence of lower extremity/foot/toe amputation                   Taking medication that increases risk (per facility policy)  Verbal and written fall prevention info given. Pt is wc bound and transfers with assist.     Wound Characteristics                                                    Location:R lateral malleolus   Initial Evaluation  Date:01/24/2018 Encounter Date: 03/15/2018   Tissue Type and %: 100% dry black scab/eschar 100% dry yellow   Periwound: intact Mild erythema   Drainage: None None   Exposed structures None BAN   Wound Edges:   Closed with scab/eschar Closed   Odor: None None    S&S of Infection:   None None   Edema: None None   Sensation: PN - LOPS LOPS               Measurements: Initial Evaluation  Date:01/24/2018 Encounter Note 03/15/2018   Length (cm) 0.9 0.4   Width (cm) 0.9 0.6   Depth (cm) na 0.2   Area (cm2) 0.81cm2 0.24 cm2   Tract/undermine na BAN          Wound Characteristics                                                    Location:   L dorsal 2nd toe Initial Evaluation  Date:  03/05/2018 Encounter Note 03/15/2018   Tissue Type and %: 100% dry yellow/eschar 100% dry yellow    Periwound: Mild erythema intact   Drainage: None Scant yellow   Exposed structures BAN none   Wound Edges:   Closed open   Odor: None none   S&S of Infection:   None none   Edema: Local none   Sensation: LOPS LOPS               Measurements: L dorsal 2nd toe   Initial Evaluation  Date: 03/05/2018 Encounter Note 03/15/2018   Length (cm) 0.7 0.5   Width (cm) 0.6 0.6   Depth (cm) BAN 0.2   Area (cm2) 0.42 cm2 0.30cm2   Tract/undermine BAN na              Procedures:     Debridement : Callus rings removed from both liana wound areas with a tweezers. Area debrided <1.0cm2   Cleansed with: NS/Gauze                                                                       Periwound protected with: skin prep, zinc barrier paste   Primary dressing: Medihoney gel to both wounds (per Dr. Terry)   Secondary Dressing: Nonadhesive foams secured with hypafix   Other: Komprex foam horseshoe secured with hypafix on R lateral malleolus     Patient Education: pt instr ss infection - erythema, edema, localized heat, fever/chills/N+V, when to call MD/go to ER. Pt with good understanding    03/05/2018 - Dr. Terry reviewed vascular study results with pt and wife. No further procedure or intervention at this time. R lateral malleolus wound scab removed by Dr. Terry and wound to be dressed with medihoney gel per Dr. Terry. Dr. Terry provided wife with a sample of medihoney gel to change at home between [facility] visits.  Pt and wife is known to Dr. Terry, and wife has changed pt's dressing in past as well. Reviewed s/s of infection and when to go to ER, pt and wife verbalizes understanding.    Professional Collaboration: none today    Assessment:      Wound etiology: DFU, possible arterial disease    Wound Progress:  No change    Rationale for Treatment: Medihoney to provide moist wound environment, and facilitate autolytic debridement; Komprex horseshoes to relieve pressure from areas.    Patient tolerance/compliance: Pt tolerated care well. Compliant with POC and appointments.    Complicating factors: DM, possible PVD    Need for ongoing Advanced Wound Care services:continued skilled wound care for debridement as needed, dressing management and skilled clinical observation to prevent complications and expedite healing.        Plan:      Treatment Plan and Recommendations: keep areas clean and dry until arterial status has been determined. Pt to get arterial studies asap    Diagnosis/ICD10: S91.009s R lateral malleolus, L lateral foot - dry eschar/scab areas. Scab on L dorsal 2nd toe    Procedures/CPT: nonselective debridement 37879    Frequency: 1 x week      Treatment Goals: STG 2 Weeks  LTG 4 Weeks   Granulation Tissue: Pending vascular consultation %   Decrease Necrotic Tissue to: % %   Wound Phase:      Decrease Size by: % %   Periwound:      Decrease tracts/undermining by: % %   Decrease Pain:          At the time of each visit a thorough assessment of the patient is completed to assure the  appropriateness of our plan of care.  The dressings or modalities may need to be adapted   from the original plan to address any significant changes in the wound environment.

## 2018-03-15 NOTE — PROGRESS NOTES
Subjective:      Av Bob is a 72 y.o. male who presents with Follow-Up and Chronic Kidney Disease            Chronic Kidney Disease   Pertinent negatives include no abdominal pain, chest pain, chills, coughing, fever, nausea or vomiting.     Av is coming today for f/u of CKD III, microalbuminuria  Has long term hx/of Nephrolithiasis -f/u with Urologist -  Baseline creatinine level at 1.2's - now worse to 1.8!  Diagnosed with left renal obstructing kidney stone -treated with lithotripsy  (+) for recurrent UTI, treated for Candida Glabrata prior surgery  Doing better, no new complaints  No difficulties to urinate.  No dysuria/hematuria.  No flank pain  HTN: BP well controlled    Review of Systems   Constitutional: Negative for chills, fever, malaise/fatigue and weight loss.   HENT: Negative.    Eyes: Negative.    Respiratory: Negative for cough, hemoptysis, shortness of breath and wheezing.    Cardiovascular: Negative for chest pain, palpitations, orthopnea and leg swelling.   Gastrointestinal: Negative for abdominal pain, diarrhea, heartburn, nausea and vomiting.   Genitourinary: Negative for dysuria, flank pain, frequency, hematuria and urgency.   Skin: Negative.    All other systems reviewed and are negative.         Objective:     /76   Pulse 68   Temp 36.4 °C (97.6 °F)   Resp 14   Ht 1.829 m (6')   Wt 87.1 kg (192 lb)   SpO2 99%   BMI 26.04 kg/m²      Physical Exam   Constitutional: He is oriented to person, place, and time. He appears well-developed and well-nourished. No distress.   HENT:   Head: Normocephalic and atraumatic.   Nose: Nose normal.   Mouth/Throat: Oropharynx is clear and moist.   Eyes: Conjunctivae are normal. Pupils are equal, round, and reactive to light.   Neck: Normal range of motion. Neck supple.   Cardiovascular: Normal rate and regular rhythm.  Exam reveals no gallop and no friction rub.    Pulmonary/Chest: Effort normal and breath sounds normal. No  respiratory distress. He has no wheezes. He has no rales.   Abdominal: Soft. Bowel sounds are normal. He exhibits no distension. There is no tenderness.   Musculoskeletal: He exhibits no edema.   Neurological: He is alert and oriented to person, place, and time. No cranial nerve deficit. Coordination normal.   Nursing note and vitals reviewed.            Laboratory results reviewed:  Lab Results   Component Value Date/Time    CREATININE 1.81 (H) 03/08/2018 11:11 AM    CREATININE 1.4 05/28/2008 05:42 PM    POTASSIUM 4.1 03/08/2018 11:11 AM       Assessment/Plan:     1. CKD (chronic kidney disease), stage III      Creat worse from baseline -to monitor closely      2. Essential hypertension, benign      Well controlled    3. Microalbuminuria due to type 2 diabetes mellitus (CMS-HCC)      Microalb/creat ratio at 300 -on ARB -to monitor      4. Vitamin D deficiency      Continue supplementation      5. Nephrolithiasis      s/p lithotripsy    6.UTI -to complete urine culture       Recs;  f/u in 4 weeks             To drink plenty of fluids              Avoid NSAID's              Complete urine culture and call clinic for further recommendations              Monitor BP

## 2018-03-16 ENCOUNTER — APPOINTMENT (OUTPATIENT)
Dept: PHYSICAL THERAPY | Facility: REHABILITATION | Age: 73
End: 2018-03-16
Attending: FAMILY MEDICINE
Payer: MEDICARE

## 2018-03-17 ENCOUNTER — HOSPITAL ENCOUNTER (OUTPATIENT)
Dept: LAB | Facility: MEDICAL CENTER | Age: 73
End: 2018-03-17
Attending: INTERNAL MEDICINE
Payer: MEDICARE

## 2018-03-17 DIAGNOSIS — N39.0 URINARY TRACT INFECTION WITHOUT HEMATURIA, SITE UNSPECIFIED: ICD-10-CM

## 2018-03-17 PROCEDURE — 87186 SC STD MICRODIL/AGAR DIL: CPT

## 2018-03-17 PROCEDURE — 87086 URINE CULTURE/COLONY COUNT: CPT

## 2018-03-17 PROCEDURE — 87077 CULTURE AEROBIC IDENTIFY: CPT

## 2018-03-19 ENCOUNTER — PHYSICAL THERAPY (OUTPATIENT)
Dept: PHYSICAL THERAPY | Facility: REHABILITATION | Age: 73
End: 2018-03-19
Attending: FAMILY MEDICINE
Payer: MEDICARE

## 2018-03-19 ENCOUNTER — TELEPHONE (OUTPATIENT)
Dept: MEDICAL GROUP | Facility: MEDICAL CENTER | Age: 73
End: 2018-03-19

## 2018-03-19 DIAGNOSIS — Z86.73 HISTORY OF ARTERIAL ISCHEMIC STROKE: ICD-10-CM

## 2018-03-19 DIAGNOSIS — Z99.3 WHEELCHAIR DEPENDENT: ICD-10-CM

## 2018-03-19 DIAGNOSIS — R53.81 DEBILITY: ICD-10-CM

## 2018-03-19 PROCEDURE — 97116 GAIT TRAINING THERAPY: CPT

## 2018-03-19 PROCEDURE — 97112 NEUROMUSCULAR REEDUCATION: CPT

## 2018-03-19 PROCEDURE — 97110 THERAPEUTIC EXERCISES: CPT

## 2018-03-19 NOTE — TELEPHONE ENCOUNTER
Future Appointments       Provider Department Center    3/19/2018 2:00 PM Debby Giles, PT, DPT [facility] [address]    3/21/2018 8:00 AM Regino Mijares, MS,OTR/L [facility] [address] [place] [address]    3/22/2018 2:30 PM Nydia Rendon R.N. W[facility] [address]    3/26/2018 10:20 AM Mitchell Pantoja M.D.; [facility]    3/26/2018 1:00 PM Regino Mijares, MS,OTR/L [facility] [address]    3/26/2018 2:00 PM Debby Giles, PT, DPT [facility] [address]    4/3/2018 2:00 PM Osvaldo Tucker M.D. [facility]     4/5/2018 9:30 AM Ian Florian R.N. Wound Care [address]    4/12/2018 1:45 PM Laly Jarvis M.D. [facility] [address] [place] [address]    4/12/2018 2:30 PM Sina Pratt R.N. Wound Care [address]    4/16/2018 12:00 PM Trinidad Maguire M.D. [facility]    4/20/2018 3:00 PM Mariella Levi, Ph.D. [facility]    5/29/2018 1:45 PM ALYSSA Lu. [facility]        ANNUAL WELLNESS VISIT PRE-VISIT PLANNING WITHOUT OUTREACH    2.  If any orders were placed at last visit, do we have Results/Consult Notes?        •  Labs - Labs ordered, NOT completed. Patient advised to complete prior to next appointment.  Note: If patient appointment is for lab review and patient did not complete labs, check with provider if OK to reschedule patient until labs completed.       •  Imaging - Imaging was not ordered at last office visit.       •  Referrals - No referrals were ordered at last office visit.    3.  Immunizations were updated in [facility] using WebIZ?: Yes       •  WebIZ Recommendations: FLU and PREVNAR (PCV13)         •  Is patient due for Tdap? NO       •  Is patient due for Shingles? NO     4.  Patient  is due for the following Health Maintenance Topics:   Health Maintenance Due   Topic Date Due   • IMM PNEUMOCOCCAL 65+ (ADULT) HIGH/HIGHEST RISK SERIES (1 of 2 - PCV13) 03/30/2012   • Annual Wellness Visit  08/19/2016   • IMM INFLUENZA (1) 09/01/2017     5.  Reviewed/Updated the following with patient:       •   Preferred Pharmacy? YES       •   Preferred Lab? YES       •   Preferred Communication? YES       •   Allergies? YES       •   Medications? YES. Was Abstract Encounter opened and chart updated? YES       •   Social History? YES. Was Abstract Encounter opened and chart updated? YES       •   Family History (document living status of immediate family members and if + hx of  cancer, diabetes, hypertension, hyperlipidemia, heart attack, stroke) YES. Was Abstract Encounter opened and chart updated? YES    6.  Care Team Updated:       •   DME Company (gait device, O2, CPAP, etc.): N\A       •   Other Specialists (eye doctor, derm, GYN, cardiology, endo, etc): YES    7.  Patient has the following Care Path diagnoses on Problem List:  DIABETES    Has patient ever had diabetes education? Yes, and is NOT interested in more at this time.    DEPRESSION     Is patient seeing a counselor, psychiatrist or other healthcare provider regarding their mental health? Yes, CareTeam was updated.    Depression Screen (PHQ-2/PHQ-9) 8/19/2015   PHQ-2 Total Score 0       Interpretation of PHQ-9 Total Score   Score Severity   1-4 No Depression   5-9 Mild Depression   10-14 Moderate Depression   15-19 Moderately Severe Depression   20-27 Severe Depression              9.  Patient was advised: “This is a free wellness visit. The provider will screen for medical conditions to help you stay healthy. If you have other concerns to address you may be asked to discuss these at a separate visit or there may be an additional fee.”     10.  Patient was informed to arrive 15 min prior to their scheduled appointment and bring in their medication  bottles.

## 2018-03-19 NOTE — TELEPHONE ENCOUNTER
Phone Number Called: [phone] (home)       Message: Pt called back and left message for Ethyl.  Looks like you were the one that called regarding previsit planning.    Left Message for patient to call back: N\A

## 2018-03-19 NOTE — TELEPHONE ENCOUNTER
Left message for patient to call back regarding pre-visit planning. Please transfer call to [phone].

## 2018-03-20 ENCOUNTER — TELEPHONE (OUTPATIENT)
Dept: NEPHROLOGY | Facility: MEDICAL CENTER | Age: 73
End: 2018-03-20

## 2018-03-20 NOTE — OP THERAPY DAILY TREATMENT
Outpatient Physical Therapy  DAILY TREATMENT     [facility] [address]  [address]  [place] [address]  Phone:  [phone]  Fax:  [phone]    Date: 03/19/2018    Patient: Av Bob  YOB: 1945  MRN: [id number]     Time Calculation  Start time: 1400  Stop time: 1500 Time Calculation (min): 60 minutes     Chief Complaint: debility s/p CVA with L sided weakness    Visit #: 24    SUBJECTIVE:  Patient reports that he has been working on standing and transfers in home. Continue to use transfer pole.     OBJECTIVE:  Current objective measures:   Sit to stand Lesia with w/c surface with increased time           Therapeutic Exercises (CPT 90554):     1. Nustep , level 4, 10 minutes     Therapeutic Treatments and Modalities:     1. Neuromuscular Re-education (CPT 39762), educated patient on Bioness device and setup for calf and hamstring cuff to assist with L LE activation in stance and gait activities     2. Gait Training (CPT 87180), with Bioness fitted, gait 3 x 10 ft fwd/ bwd at raised mat cueing for L anterolateral WS, no AFO on progressing to 75 ft with SBQC, patient demonstrated improved R step length and lorena speed with bioness active     Time-based treatments/modalities:  Gait training minutes (CPT 44476): 30 minutes  Therapeutic exercise minutes (CPT 22881): 10 minutes  Neuromusc re-ed, balance, coor, post minutes (CPT 83389): 15 minutes       Pain rating before treatment: 0  Pain rating after treatment: 0    ASSESSMENT:   Response to treatment: Patient demonstrated improved quality of gait with bioness donned today. Overall, patient is slowly continuing to back improvements with transfers. Limitations include impaired awareness of L side and proprioception with mobility. Continued discussion of patient sessions and progress, at this time will continue 1 x a week until the end of the month.     PLAN/RECOMMENDATIONS:   Plan for treatment: therapy  treatment to continue next visit. NMRE, standing, and gait training with wife.   Planned interventions for next visit: continue with current treatment.

## 2018-03-21 ENCOUNTER — APPOINTMENT (OUTPATIENT)
Dept: LAB | Facility: MEDICAL CENTER | Age: 73
End: 2018-03-21
Attending: UROLOGY
Payer: MEDICARE

## 2018-03-21 ENCOUNTER — OCCUPATIONAL THERAPY (OUTPATIENT)
Dept: OCCUPATIONAL THERAPY | Facility: REHABILITATION | Age: 73
End: 2018-03-21
Attending: FAMILY MEDICINE
Payer: MEDICARE

## 2018-03-21 ENCOUNTER — HOSPITAL ENCOUNTER (OUTPATIENT)
Dept: RADIOLOGY | Facility: MEDICAL CENTER | Age: 73
End: 2018-03-21
Attending: UROLOGY
Payer: MEDICARE

## 2018-03-21 DIAGNOSIS — N20.0 URIC ACID NEPHROLITHIASIS: ICD-10-CM

## 2018-03-21 DIAGNOSIS — Z86.73 PERSONAL HISTORY OF TRANSIENT CEREBRAL ISCHEMIA: ICD-10-CM

## 2018-03-21 PROCEDURE — 74018 RADEX ABDOMEN 1 VIEW: CPT

## 2018-03-21 PROCEDURE — 97112 NEUROMUSCULAR REEDUCATION: CPT | Mod: KX

## 2018-03-21 PROCEDURE — 97530 THERAPEUTIC ACTIVITIES: CPT | Mod: KX

## 2018-03-21 NOTE — TELEPHONE ENCOUNTER
Left message for patient to call back regarding pre-visit planning. Please transfer call to [phone].

## 2018-03-21 NOTE — OP THERAPY DAILY TREATMENT
"  Outpatient Occupational Therapy  DAILY TREATMENT     [facility] [address].  [address]  [place] [address]  Phone:  [phone]  Fax:  [phone]    Date: 03/21/2018    Patient: Av Bob  YOB: 1945  MRN: [id number]     Time Calculation  Start time: 0805  Stop time: 0905 Time Calculation (min): 60 minutes     Chief Complaint: Extremity Weakness    Visit #: 15    SUBJECTIVE: \"My left hand is a little better\"    OBJECTIVE:  Current objective measures:   Shoulder ff: 2-/5  Scapular strength 2-/5  Left elbow flexion/extension 3+/5  Left wrist extension 3-/5  Left digit flexion: 3+/5  Left digit extension: digits1-3 are 3/5 strength; digits 4-5 are 3-/5 strength  Grade 2 flexor tone at wrist, Grade 1+ at digits        Therapeutic Treatments and Modalities:    1. Neuromuscular Re-education (CPT 84027)    2. Therapeutic Activities (CPT 89333)    Therapeutic Treatments and Modalities Summary: Attempted 9-hole peg test: able to place only1 peg with LH.   Trialed large pegboard with limited success to place pegs but able to remove 12 pegs with extra time.  LUE passive sustained stretch shoulder to digits.  Performed left wrist/digit extension from relaxed state in multiple positions of shoulder/elbow flexion/rotation/abduction.  Pipe frame for scapular elevation/retraction, WB holding back of chair for scapular depression.  WB though elbow with palm over orange studded ball with muscle belly tapping for combined gross wrist/digit extension multiple reps.  HEP updated with good understanding.    Time-based treatments/modalities:  Therapeutic activity minutes (CPT 31862): 15 minutes  Neuromusc re-ed minutes (CPT 80257): 45 minutes      ASSESSMENT:   Response to treatment: Pt making slow but steady gains with his LUE neuromuscular recovery in response to facilitory techniques for extensor muscle activity and use of inhibitory techniques to decrease flexor " spasticity.  HEP updated with good understanding.    PLAN/RECOMMENDATIONS:   Plan for treatment: therapy treatment to continue next visit.  Planned interventions for next visit: continue with current treatment, E-stim attended (CPT 64071), neuromuscular re-education (CPT 47015), self care ADL training (CPT 22352) and therapeutic activities (CPT 68716)

## 2018-03-22 ENCOUNTER — NON-PROVIDER VISIT (OUTPATIENT)
Dept: WOUND CARE | Facility: MEDICAL CENTER | Age: 73
End: 2018-03-22
Attending: INTERNAL MEDICINE
Payer: MEDICARE

## 2018-03-22 PROCEDURE — 97602 WOUND(S) CARE NON-SELECTIVE: CPT

## 2018-03-22 NOTE — WOUND TEAM
Advanced Wound Care  [facility]  [address]  [address]  [place] [address]  [phone] Fax: [phone]      Encounter Note  For Certification Period:01/24/2018 - 04/16/2018      Referring Physician: Trinidad Maguire  Primary Physician:     Dr. Mitchell Pantoja MD   Consulting Physicians:         Wound(s):S91.009s R lateral malleolus, L lateral foot - dry eschar/scab areas. Scab on L dorsal 2nd toe  Start of Care: 01/24/2018       Subjective:        HPI: Pt is a 72 year old diabetic gentleman with a hx of cardiovascular disease, past CVA with L hemiparesis who is referred by PCP for eval of necrotic areas on R lateral malleolus and L lateral foot. He also has scabbed areas on L dorsal 2nd toe and several small dry scabs on LLE. Pt is wc bound, transfers with assist.  He has PN and LOPS dez feet.              Pain: pt denies pain            Current Medications: no med changes per pt    Allergies: Diphenhydramine hcl; Lorazepam; Ciprofloxacin; and Nkda [no known drug allergy]      Objective:      Tests and Measures: Last A1c 01/15/2018 - 7.6 per EPIC. FBS today 177, yesterday 138 per spouse. Pt states it is usually <150.   Monofilament test reveals R - 1/10 sites sensate, L - 0/10 sites sensate - LOPS dez.     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 Arterial wound healing studies ordered BLE. PAR asked to schedule pt with Dr. Terry for vascular consult.    02/08/2018 - vascular study results from [facility] -   Findings   Bilateral.    Doppler waveform of the common femoral artery is of high amplitude and    triphasic.    Doppler waveforms at the ankle are brisk and biphasic.    Absent flow within  the Left VIVIENNE.     Arterial duplex scan was performed in accordance with lower extremity    arterial evaluation protocol - see separate report.    Lower Extremity   Arterial Duplex Report     Vascular Laboratory   CONCLUSIONS   Right Lower Extremity-   Normal flow from the common femoral artery extending distally to the    popliteal artery. Flow within the posterior tibial artery is brisk and    multiphasic.   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     Left Lower Extremity-   Normal flow seen from the common femoral artery extending distally to the    popliteal artery.   Area of elevated velocities seen within the mid segment of the posterior    tibial artery. Consistent with >50% stenosis.   Occlusion of the anterior tibial artery at the mid-distal segment, minimal    flow reversal seen at the level of the ankle.     ELIGIO MADRIGAL     Exam Date:     02/07/2018 14:07        Orthotic, protective, supportive devices: pt is wc bound secondary to L hemiparisis from CVA    Fall Risk Assessment (naomy all that apply with an X):             X   65 years or older                     Fall within the last 2 years, uses   X   Ambulatory devices   X   Loss of protective sensation in feet,          Use of prostethic/orthotic, years                     Presence of lower extremity/foot/toe amputation                   Taking medication that increases risk (per facility policy)  Verbal and written fall prevention info given. Pt is wc bound and transfers with assist.     Wound Characteristics                                                    Location:R lateral malleolus   Initial Evaluation  Date:01/24/2018 Encounter Date: 03/22/2018   Tissue Type and %: 100% dry black scab/eschar 100% moist yellow   Periwound: intact Mild erythema   Drainage: None None   Exposed structures None BAN   Wound Edges:   Closed with scab/eschar Closed   Odor: None None    S&S of Infection:   None None   Edema: None None   Sensation: PN - LOPS LOPS               Measurements: Initial Evaluation  Date:01/24/2018 Encounter Note 03/22/2018   Length (cm) 0.9 0.5   Width (cm) 0.9 0.5   Depth (cm) na BAN   Area (cm2) 0.81cm2 0.25 cm2   Tract/undermine na BAN          Wound Characteristics                                                    Location:   L dorsal 2nd toe Initial Evaluation  Date:  03/05/2018 Encounter Note 03/22/2018   Tissue Type and %: 100% dry yellow/eschar 100% moist yellow    Periwound: Mild erythema Intact   Drainage: None Scant yellow   Exposed structures BAN None   Wound Edges:   Closed Open   Odor: None None   S&S of Infection:   None None   Edema: Local None   Sensation: LOPS LOPS               Measurements: L dorsal 2nd toe   Initial Evaluation  Date: 03/05/2018 Encounter Note 03/22/2018   Length (cm) 0.7 0.4   Width (cm) 0.6 0.4   Depth (cm) BAN BAN   Area (cm2) 0.42 cm2 0.16 cm2   Tract/undermine BAN BAN        Procedures:     Debridement : Non selective with NS gauze   Cleansed with: NS/Gauze                                                                       Periwound protected with: skin prep   Primary dressing: Medihoney gel to both wounds   Secondary Dressing: Nonadhesive foams secured with hypafix   Other: Komprex foam horseshoe secured with hypafix on R lateral malleolus     Patient Education: Reviewed POC and rationale for dressing selection with patient and spouse. Instructed pt on s/s infection - chills, fever, malaise, NV, increased redness/swelling/pain/exudate - and to go to ER/Urgent Care; to keep dressing D/I. Pt ans spouse verbalize understanding to all.     03/05/2018 - Dr. Terry reviewed vascular study results with pt and wife. No further procedure or intervention at this time. R lateral malleolus wound scab removed by Dr. Terry and wound to be dressed with medihoney gel per Dr. Terry. Dr. Terry provided wife with a sample of  medihoney gel to change at home between [facility] visits. Pt and wife is known to Dr. Terry, and wife has changed pt's dressing in past as well. Reviewed s/s of infection and when to go to ER, pt and wife verbalizes understanding.    Professional Collaboration: none today    Assessment:      Wound etiology: DFU, possible arterial disease    Wound Progress:  Wound slightly smaller per measurement.     Rationale for Treatment: Medihoney to provide moist wound environment, and facilitate autolytic debridement; Komprex horseshoes to relieve pressure from areas.    Patient tolerance/compliance: Pt tolerated care well. Compliant with POC and appointments.    Complicating factors: DM, possible PVD    Need for ongoing Advanced Wound Care services:continued skilled wound care for debridement as needed, dressing management and skilled clinical observation to prevent complications and expedite healing.        Plan:      Treatment Plan and Recommendations: keep areas clean and dry until arterial status has been determined. Pt to get arterial studies asap    Diagnosis/ICD10: S91.009s R lateral malleolus, L lateral foot - dry eschar/scab areas. Scab on L dorsal 2nd toe    Procedures/CPT: nonselective debridement 65827    Frequency: 1 x week      Treatment Goals: STG 2 Weeks  LTG 4 Weeks   Granulation Tissue: Pending vascular consultation %   Decrease Necrotic Tissue to: % %   Wound Phase:      Decrease Size by: % %   Periwound:      Decrease tracts/undermining by: % %   Decrease Pain:          At the time of each visit a thorough assessment of the patient is completed to assure the  appropriateness of our plan of care.  The dressings or modalities may need to be adapted   from the original plan to address any significant changes in the wound environment.

## 2018-03-26 ENCOUNTER — PHYSICAL THERAPY (OUTPATIENT)
Dept: PHYSICAL THERAPY | Facility: REHABILITATION | Age: 73
End: 2018-03-26
Attending: FAMILY MEDICINE
Payer: MEDICARE

## 2018-03-26 ENCOUNTER — OCCUPATIONAL THERAPY (OUTPATIENT)
Dept: OCCUPATIONAL THERAPY | Facility: REHABILITATION | Age: 73
End: 2018-03-26
Attending: FAMILY MEDICINE
Payer: MEDICARE

## 2018-03-26 ENCOUNTER — TELEPHONE (OUTPATIENT)
Dept: NEPHROLOGY | Facility: MEDICAL CENTER | Age: 73
End: 2018-03-26

## 2018-03-26 ENCOUNTER — OFFICE VISIT (OUTPATIENT)
Dept: MEDICAL GROUP | Facility: MEDICAL CENTER | Age: 73
End: 2018-03-26
Payer: MEDICARE

## 2018-03-26 VITALS
HEIGHT: 72 IN | OXYGEN SATURATION: 99 % | RESPIRATION RATE: 14 BRPM | TEMPERATURE: 96.8 F | HEART RATE: 63 BPM | WEIGHT: 203.6 LBS | BODY MASS INDEX: 27.58 KG/M2 | SYSTOLIC BLOOD PRESSURE: 126 MMHG | DIASTOLIC BLOOD PRESSURE: 72 MMHG

## 2018-03-26 DIAGNOSIS — Z86.73 PERSONAL HISTORY OF TRANSIENT CEREBRAL ISCHEMIA: ICD-10-CM

## 2018-03-26 DIAGNOSIS — N18.30 CKD (CHRONIC KIDNEY DISEASE) STAGE 3, GFR 30-59 ML/MIN (HCC): ICD-10-CM

## 2018-03-26 DIAGNOSIS — I10 ESSENTIAL HYPERTENSION, BENIGN: ICD-10-CM

## 2018-03-26 DIAGNOSIS — E11.3293 CONTROLLED TYPE 2 DIABETES MELLITUS WITH BOTH EYES AFFECTED BY MILD NONPROLIFERATIVE RETINOPATHY WITHOUT MACULAR EDEMA, WITHOUT LONG-TERM CURRENT USE OF INSULIN (HCC): ICD-10-CM

## 2018-03-26 DIAGNOSIS — E11.21 DIABETIC NEPHROPATHY ASSOCIATED WITH TYPE 2 DIABETES MELLITUS (HCC): ICD-10-CM

## 2018-03-26 DIAGNOSIS — M1A.09X0 IDIOPATHIC CHRONIC GOUT OF MULTIPLE SITES WITHOUT TOPHUS: ICD-10-CM

## 2018-03-26 DIAGNOSIS — F33.1 MODERATE EPISODE OF RECURRENT MAJOR DEPRESSIVE DISORDER (HCC): ICD-10-CM

## 2018-03-26 DIAGNOSIS — Z99.3 WHEELCHAIR DEPENDENT: ICD-10-CM

## 2018-03-26 DIAGNOSIS — E78.2 MIXED HYPERLIPIDEMIA: ICD-10-CM

## 2018-03-26 DIAGNOSIS — I11.9 BENIGN HYPERTENSIVE HEART DISEASE WITHOUT HEART FAILURE: ICD-10-CM

## 2018-03-26 DIAGNOSIS — I48.0 PAROXYSMAL ATRIAL FIBRILLATION (HCC): ICD-10-CM

## 2018-03-26 DIAGNOSIS — Z86.73 HISTORY OF ARTERIAL ISCHEMIC STROKE: ICD-10-CM

## 2018-03-26 DIAGNOSIS — N39.0 ACUTE UTI: ICD-10-CM

## 2018-03-26 DIAGNOSIS — F51.04 PSYCHOPHYSIOLOGICAL INSOMNIA: ICD-10-CM

## 2018-03-26 DIAGNOSIS — E11.42 DIABETIC POLYNEUROPATHY ASSOCIATED WITH TYPE 2 DIABETES MELLITUS (HCC): ICD-10-CM

## 2018-03-26 DIAGNOSIS — Z99.3 WHEELCHAIR DEPENDENCE: ICD-10-CM

## 2018-03-26 DIAGNOSIS — D50.9 NORMOCYTIC HYPOCHROMIC ANEMIA: ICD-10-CM

## 2018-03-26 DIAGNOSIS — G81.94 LEFT HEMIPARESIS (HCC): ICD-10-CM

## 2018-03-26 DIAGNOSIS — Z00.00 MEDICARE ANNUAL WELLNESS VISIT, SUBSEQUENT: Primary | ICD-10-CM

## 2018-03-26 DIAGNOSIS — R53.81 DEBILITY: ICD-10-CM

## 2018-03-26 DIAGNOSIS — I25.10 ATHEROSCLEROSIS OF NATIVE CORONARY ARTERY OF NATIVE HEART WITHOUT ANGINA PECTORIS: ICD-10-CM

## 2018-03-26 PROBLEM — F32.A DEPRESSION: Status: RESOLVED | Noted: 2018-01-30 | Resolved: 2018-03-26

## 2018-03-26 PROBLEM — N20.1 OBSTRUCTION OF LEFT URETEROPELVIC JUNCTION DUE TO STONE: Status: RESOLVED | Noted: 2017-07-18 | Resolved: 2018-03-26

## 2018-03-26 PROBLEM — N17.9 ACUTE KIDNEY INJURY (HCC): Status: RESOLVED | Noted: 2018-01-24 | Resolved: 2018-03-26

## 2018-03-26 PROCEDURE — 97112 NEUROMUSCULAR REEDUCATION: CPT

## 2018-03-26 PROCEDURE — 97530 THERAPEUTIC ACTIVITIES: CPT

## 2018-03-26 PROCEDURE — G8979 MOBILITY GOAL STATUS: HCPCS | Mod: CJ

## 2018-03-26 PROCEDURE — 97116 GAIT TRAINING THERAPY: CPT | Mod: KX,XU

## 2018-03-26 PROCEDURE — 97112 NEUROMUSCULAR REEDUCATION: CPT | Mod: KX

## 2018-03-26 PROCEDURE — G0439 PPPS, SUBSEQ VISIT: HCPCS | Performed by: FAMILY MEDICINE

## 2018-03-26 PROCEDURE — G8980 MOBILITY D/C STATUS: HCPCS | Mod: CK

## 2018-03-26 RX ORDER — CEFDINIR 300 MG/1
CAPSULE ORAL
Refills: 0 | COMMUNITY
Start: 2018-03-22 | End: 2018-04-03

## 2018-03-26 ASSESSMENT — PATIENT HEALTH QUESTIONNAIRE - PHQ9: CLINICAL INTERPRETATION OF PHQ2 SCORE: 0

## 2018-03-26 ASSESSMENT — ACTIVITIES OF DAILY LIVING (ADL): BATHING_REQUIRES_ASSISTANCE: 0

## 2018-03-26 NOTE — OP THERAPY DAILY TREATMENT
Outpatient Physical Therapy  DAILY TREATMENT     [facility] Physical Therapy [address]  [address]  [place] [address]  Phone:  [phone]  Fax:  [phone]    Date: 03/26/2018    Patient: Av Bob  YOB: 1945  MRN: [id number]     Time Calculation  Start time: 1410  Stop time: 1505 Time Calculation (min): 55 minutes     Chief Complaint: Impaired mobility   Visit #: 25    SUBJECTIVE:  Patient present with wife. Reports patient is practicing standing and gait at the counter.     OBJECTIVE:   Current objective measures:   Sit to stand Lesia  Gait around raised table: Lesia with           Therapeutic Treatments and Modalities:     1. Therapeutic Activities (CPT 82278), discussed continuing resources in community for patient to remain active after DC. Resources given; [facility] Adult day program, [facility] Stroke Support group     2. Neuromuscular Re-education (CPT 80352), Discussed and demonstrated with patient and wife integrating L side into activity with sit to stand and WS. Demonstrated with wife donning L AFO for improved fit, sit to stand x 5 with cueing for anterior WS Lesia, patient continues to need cueing to reinforce anterior WS     3. Gait Training (CPT 37652), 4 x 15 ft at raised table close supervised to Lesia, cueing for increased R LE step and L LE activation in stance. cueing for additional increase from narrow ALLEN. Recommended for home program sidestepping at counter as well. Gait with SBQC modA 10 ft, continue to recommend with wife at this time, for patient to gait train at counter top or use platform FWW    Time-based treatments/modalities:  Gait training minutes (CPT 02417): 30 minutes  Therapeutic activity minutes (CPT 09506): 10 minutes  Neuromusc re-ed, balance, coor, post minutes (CPT 53432): 15 minutes       Pain rating before treatment: 0  Pain rating after treatment: 0    ASSESSMENT:   Response to treatment: Patient will continue  to benefit from practicing HEP to improve mobility, but at this time, demonstrating plateau with PT. Recommend a break from PT.     PLAN/RECOMMENDATIONS:   Plan for treatment: d/c to plateau.

## 2018-03-26 NOTE — TELEPHONE ENCOUNTER
Reggie Jarvis,    Pt's wife Usha left a message asking if you can please give her a call back.    Thank you.

## 2018-03-26 NOTE — OP THERAPY DISCHARGE SUMMARY
Outpatient Physical Therapy  DISCHARGE SUMMARY NOTE      [facility] Physical Therapy [place] [address]  [address]  [place] [address]  Phone:  [phone]  Fax:  [phone]    Date of Visit: 03/26/2018    Patient: Av Bob  YOB: 1945  MRN: [id number]     Referring Provider: Mitchell Pantoja M.D.  [address]  [address]  [place] [address]   Referring Diagnosis Personal history of transient ischemic attack (TIA), and cerebral infarction without residual deficits [Z86.73];Dependence on wheelchair [Z99.3]     Physical Therapy Occurrence Codes    Date physical therapy care plan established or reviewed:  12/11/17   Date physical therapy treatment started:  11/20/17          Functional Limitation G-Codes and Severity Modifiers      Goal: Mobility: Goal (): CJ   Discharge: Mobility: Discharge (): CK       Your patient is being discharged from Physical Therapy with the following comments:   · Progress plateau    Comments:  Patient was seen for 25 visits from last year extending into this current year.  At this time, recommending d/c from PT to continue HEP, while patient is demonstrating progress plateau. Patient is currently primarily using W/C and platform FWW. During the course of the tx, patient was fitted for L AFO which has improved safety with mobility.     Short Term Goals:   Patient able to walk 150 feet with assistive device @ SBA (not met)  Patient out of bed >75% of day (met)   Independent with transfers using FWW/W/C (not met, min/modA depending on surface height)        Long Term Goals:    Independent with all ADLs ( not met at this time)    Ambulation 300 feet Independently with A.D. (not met at this time)       Limitations Remaining:  Patient continues to demonstrate L sided weakness and requiring Lesia/supervised with transfers.     Recommendations:  D/c from PT at this time. Recommend additional referral for re-assessment in a few months if  patient noticing improvement.     Debby Giles, PT, DPT    Date: 3/26/2018

## 2018-03-26 NOTE — PROGRESS NOTES
Chief Complaint   Patient presents with   • Annual Exam         HPI:  Av is a 72 y.o. here for Medicare Annual Wellness Visit    Acute UTI  Patient with acute UTI that was discovered by his nephrologist on routine UA, then confirmed by urine culture. Patient is on appropriate antibiotics at this time, and is taking them as directed. Patient's symptoms are overall improving.    ROS negative for fevers, chills, nausea, emesis, flank pain, dysuria, hematuria.    CKD (chronic kidney disease) stage 3, GFR 30-59 ml/min  Chronic, stable, controlled. Patient is being followed by his nephrologist, Dr. Jarvis.    Benign hypertensive heart disease without heart failure  Chronic, stable, controlled. Last echocardiogram was in October 2017. Hypertension is well controlled. Diabetes nearly well controlled.    Atherosclerosis of native coronary artery of native heart  Chronic, stable, controlled. Patient is being followed by his cardiologist for this condition as well as his hypertensive heart disease, and atrial fibrillation.    Paroxysmal atrial fibrillation (CMS-HCC)  Chronic, stable, well controlled. Patient taking medications as directed, including chronic anticoagulation.    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  Please continue current regimen, which includes seeing his nephrologist for diabetic nephropathy, as well as medications as directed.    Diabetic nephropathy associated with type 2 diabetes mellitus (CMS-[facility])  This problem is chronic, stable, and monitored appropriately by history/labs/imaging as needed, and is well-controlled on the current regimen.  Please continue current regimen including seeing Nephrologist    Diabetic  polyneuropathy (CMS-[facility])  This problem is chronic, stable, and monitored appropriately by history/labs/imaging as needed, and is well-controlled on the current regimen.  Please continue current regimen    Essential hypertension, benign  This problem is chronic, stable, and monitored appropriately by history/labs/imaging as needed, and is well-controlled on the current regimen.  Please continue current regimen     Mixed hyperlipidemia  This problem is chronic, stable, and monitored appropriately by history/labs/imaging as needed, and is well-controlled on the current regimen.  Please continue current regimen    Idiopathic chronic gout of multiple sites without tophus  This problem is chronic, stable, and monitored appropriately by history/labs/imaging as needed, and is well-controlled on the current regimen.  Please continue current regimen    Left hemiparesis (CMS-[facility])  This problem is chronic, uncontrolled, but improving, and monitored appropriately by history/labs/imaging.  Please continue current regimen with PT + home exercises    Wheelchair dependent  This problem is chronic, stable, and monitored appropriately by history/labs/imaging as needed, and is well-controlled on the current regimen.  Please continue current regimen    Moderate episode of recurrent major depressive disorder (CMS-[facility])  This problem is chronic, stable, and monitored appropriately by history/labs/imaging as needed, and is uncontrolled but improving on the current regimen.  Please continue current regimen.      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, without  long-term current use of insulin (CMS-HCC) 12/30/2016     Priority: Medium   • Atherosclerosis of native coronary artery of native heart      Priority: Medium   • Mixed hyperlipidemia 12/13/2011     Priority: Medium   • Benign hypertensive heart disease without heart failure 12/13/2011     Priority: Medium   • Left hemiparesis (CMS-HCC) 12/27/2017     Priority: Low   • Diabetic nephropathy associated with type 2 diabetes mellitus (CMS-HCC) 11/30/2017     Priority: Low   • Psychophysiological insomnia 11/21/2017     Priority: Low   • Moderate episode of recurrent major depressive disorder (CMS-HCC) 11/07/2017     Priority: Low   • Wheelchair dependent 11/07/2017     Priority: Low   • Normocytic hypochromic anemia 05/20/2017     Priority: Low   • Diffuse large cell non-Hodgkin's lymphoma (CMS-HCC) 05/08/2017     Priority: Low   • CKD (chronic kidney disease) stage 3, GFR 30-59 ml/min 08/25/2016     Priority: Low   • Idiopathic chronic gout of multiple sites without tophus 01/28/2014     Priority: Low   • Diabetic polyneuropathy (CMS-HCC) 09/11/2013     Priority: Low   • Acute UTI 01/24/2018       Current Outpatient Prescriptions   Medication Sig Dispense Refill   • cefdinir (OMNICEF) 300 MG Cap TK 1 C PO BID  0   • Blood Glucose Monitoring Suppl Supplies Misc One Touch ultra glucometer strips for blood sugar check up to 5 times a day 450 Each 2   • atorvastatin (LIPITOR) 20 MG Tab Take 1 Tab by mouth every day. 90 Tab 2   • allopurinol (ZYLOPRIM) 300 MG Tab Take 150 mg by mouth every day.     • ascorbic acid (VITAMIN C) 500 MG tablet Take 500 mg by mouth every day.     • Cholecalciferol (VITAMIN D) 2000 units Cap Take 1 Cap by mouth every evening.     • insulin lispro (HUMALOG) 100 UNIT/ML Solution Inject 10-14 Units as instructed 3 times a day before meals. 10 units every meal up to , then add 2 units for Every 50 above 150 BG     • Acetaminophen (TYLENOL PO) Take 1,250 mg by mouth 2 times a day as needed. 625  mg each tab     • NON SPECIFIED Apply 1 Application to affected area(s) 3 times a day as needed. Therawax     • clopidogrel (PLAVIX) 75 MG Tab Take 1 Tab by mouth every day. 90 Tab 3   • fluoxetine (PROZAC) 40 MG capsule Take 1 Cap by mouth every day. 90 Cap 1   • Insulin Glargine (TOUJEO SOLOSTAR) 300 UNIT/ML Solution Pen-injector Inject 15 Units as instructed every evening. 3 PEN 6   • magnesium oxide (MAG-OX) 400 MG Tab Take 400 mg by mouth 3 times a day.     • fenofibrate (TRICOR) 145 MG Tab Take 1 Tab by mouth every day. 90 Tab 3   • losartan (COZAAR) 50 MG Tab Take 1 Tab by mouth every day. 30 Tab 6   • spironolactone (ALDACTONE) 50 MG Tab Take 1 Tab by mouth every day. 90 Tab 3   • aspirin 81 MG tablet Take 81 mg by mouth every day.     • CENTRUM SILVER PO Take 1 Tab by mouth every day.     • metoprolol (TOPROL-XL) 200 MG XL tablet Take 1 Tab by mouth every day. 90 Tab 3     No current facility-administered medications for this visit.         Patient is taking medications as noted in medication list.  Current supplements as per medication list.     Allergies: Diphenhydramine hcl; Lorazepam; and Ciprofloxacin    Current social contact/activities: Spending time with wife, family.     Patient's perception of their health: fair    Is patient current with immunizations? No, due for FLU and PREVNAR (PCV13) . Patient is interested in receiving NONE today.    He  reports that he has never smoked. He has never used smokeless tobacco. He reports that he does not drink alcohol or use drugs.  Counseling given: Not Answered        DPA/Advanced directive: Emergency access to my medical directives on file.     ROS:    Gait: Uses a wheelchair,walker, cane at PT   Ostomy: no   Other tubes: no   Amputations: no   Chronic oxygen use no   Last eye exam 03/2018   Wears hearing aids: no   : Reports urinary leakage during the last 6 months that has interfered a lot with their daily activities or  sleep.      Screening:  DIABETES    Has patient ever had diabetes education? Yes, and is NOT interested in more at this time.  DEPRESSION     Is patient seeing a counselor, psychiatrist or other healthcare provider regarding their mental health? Yes, CareTeam was updated.    Depression Screen (PHQ-2/PHQ-9) 8/19/2015 3/26/2018   PHQ-2 Total Score 0 -   PHQ-2 Total Score - 0       Interpretation of PHQ-9 Total Score   Score Severity   1-4 No Depression   5-9 Mild Depression   10-14 Moderate Depression   15-19 Moderately Severe Depression   20-27 Severe Depression      Depression Screening    Little interest or pleasure in doing things?  0 - not at all  Feeling down, depressed, or hopeless? 0 - not at all  Patient Health Questionnaire Score: 0    If depressive symptoms identified deferred to follow up visit unless specifically addressed in assessment and plan.    Interpretation of PHQ-9 Total Score   Score Severity   1-4 No Depression   5-9 Mild Depression   10-14 Moderate Depression   15-19 Moderately Severe Depression   20-27 Severe Depression    Screening for Cognitive Impairment    Three Minute Recall (apple, watch, jo)  1/3 Apple, table, pen   Draw clock face with all 12 numbers set to the hand to show 10 minutes past 11 o'clock  1 4/5  If cognitive concerns identified, deferred for follow up unless specifically addressed in assessment and plan.    Fall Risk Assessment    Has the patient had two or more falls in the last year or any fall with injury in the last year?  Yes  If fall risk identified, deferred for follow up unless specifically addressed in assessment and plan.    Safety Assessment    Throw rugs on floor.  Yes  Handrails on all stairs.  No  Good lighting in all hallways.  Yes  Difficulty hearing.  No  Patient counseled about all safety risks that were identified.    Functional Assessment ADLs    Are there any barriers preventing you from cooking for yourself or meeting nutritional needs?  Yes. Wife  manages   Are there any barriers preventing you from driving safely or obtaining transportation?  Yes.    Are there any barriers preventing you from using a telephone or calling for help?  No.    Are there any barriers preventing you from shopping?  No.    Are there any barriers preventing you from taking care of your own finances?  No.    Are there any barriers preventing you from managing your medications?  No.    Are there any barriers preventing you from showering, bathing or dressing yourself?  No.    Are you currently engaging any exercise or physical activity?  Yes.  PT once a week     Health Maintenance Summary                IMM PNEUMOCOCCAL 65+ (ADULT) HIGH/HIGHEST RISK SERIES Overdue 3/30/2012     Annual Wellness Visit Overdue 8/19/2016      Done 8/19/2015 Visit Dx: Encounter for Medicare annual wellness exam    IMM INFLUENZA Overdue 9/1/2017     A1C SCREENING Next Due 8/1/2018      Done 2/1/2018 HEMOGLOBIN A1C      Patient has more history with this topic...    FASTING LIPID PROFILE Next Due 10/6/2018      Done 10/6/2017 LIPID PROFILE     Patient has more history with this topic...    DIABETES MONOFILAMENT / LE EXAM Next Due 12/15/2018      Done 12/15/2017 AMB DIABETIC MONOFILAMENT LOWER EXTREMITY EXAM     Patient has more history with this topic...    RETINAL SCREENING Next Due 1/16/2019      Done 1/16/2018 REFERRAL FOR RETINAL SCREENING EXAM     Patient has more history with this topic...    COLON CANCER SCREENING ANNUAL FIT Next Due 1/16/2019      Done 1/16/2018 OCCULT BLOOD FECES IMMUNOASSAY    URINE ACR / MICROALBUMIN Next Due 3/8/2019      Done 3/8/2018 MICROALBUMIN CREAT RATIO URINE      Patient has more history with this topic...    SERUM CREATININE Next Due 3/8/2019      Done 3/8/2018 BASIC METABOLIC PANEL      Patient has more history with this topic...    IMM DTaP/Tdap/Td Vaccine Next Due 1/13/2024      Done 1/13/2014 Imm Admin: Tdap Vaccine          Patient Care Team:  Mitchell Pantoja M.D.  as PCP - General (Family Medicine)  Larry Patterson M.D. (Cardiology)  Tirnidad Maguire M.D. as Consulting Physician (Endocrinology)  Iron Cox M.D. as Consulting Physician (Ophthalmology)  Rey Barry M.D. as Consulting Physician (Urology)  Ellie Noel M.D. as Consulting Physician (Oncology)    Social History   Substance Use Topics   • Smoking status: Never Smoker   • Smokeless tobacco: Never Used   • Alcohol use No     Family History   Problem Relation Age of Onset   • Heart Disease Father      CAD   • Diabetes Father    • Cancer Mother    • Psychiatry Mother      Depression   • Depression Mother    • Kidney stones Brother    • Heart Disease Brother    • Psychiatry Brother      Depression   • Depression Brother    • Suicide Attempts Other    • Psychiatry Other      autism     He  has a past medical history of Acute respiratory failure with hypoxia (CMS-HCC) (5/20/2017); CAD (coronary artery disease); Cancer (CMS-HCC); Cataract; Cerebrovascular accident (CVA) (CMS-HCC) (12/30/2016); CKD (chronic kidney disease) stage 3, GFR 30-59 ml/min; Controlled gout (2014); Coronary atherosclerosis of native coronary artery; Depression; Diabetes (CMS-HCC); Enterococcal septicemia (CMS-HCC) (8/12/2017); Hypertension; Hypokalemia (2012); Hypomagnesemia (08/12/2017); Lymphoma (CMS-HCC) (2/19/2017); Mixed hyperlipidemia; Nephrolithiasis (2006); NSTEMI (non-ST elevated myocardial infarction) (CMS-HCC) (07/18/2017); Pain; Polyneuropathy in diabetes(357.2) (9/11/2013); Septic shock (CMS-HCC) (5/20/2017); Skin ulcer of calf (CMS-HCC) (2015); Stroke (CMS-HCC) (2016); Urinary bladder disorder; Urinary incontinence; and Wound of left leg (2012). He also has no past medical history of Substance abuse or Suicide attempt.   Past Surgical History:   Procedure Laterality Date   • CYSTOSCOPY STENT PLACEMENT Left 2/12/2018    Procedure: CYSTOSCOPY  ;  Surgeon: Rey Barry M.D.;  Location: SURGERY [facility];  Service:  Urology   • URETEROSCOPY Left 2/12/2018    Procedure: URETEROSCOPY- FLEXIBLE  ;  Surgeon: Rey Barry M.D.;  Location: SURGERY [facility];  Service: Urology   • LASERTRIPSY Left 2/12/2018    Procedure: LASERTRIPSY - LITHO  ;  Surgeon: Rey Barry M.D.;  Location: SURGERY [facility];  Service: Urology   • WOUND CLOSURE GENERAL  4/3/2012    Performed by GERHARD GILBERT at SURGERY SAME DAY [facility]   • DAGOBERTO BY LAPAROSCOPY  1998   • ANGIOPLASTY  1997    RCA followed by other stents as noted above.    • CARDIAC CATH     • CATARACT EXTRACTION WITH IOL      bilateral   • LITHOTRIPSY     • TONSILLECTOMY AND ADENOIDECTOMY             Exam:     Blood pressure 126/72, pulse 63, temperature 36 °C (96.8 °F), resp. rate 14, height 1.829 m (6'), weight 92.4 kg (203 lb 9.6 oz), SpO2 99 %. Body mass index is 27.61 kg/m².    Hearing excellent.    Dentition fair  Alert, oriented in no acute distress.  Eye contact is good, speech goal directed, affect calm      Assessment and Plan. The following treatment and monitoring plan is recommended:    1. Medicare annual wellness visit, subsequent  [place] ANNUAL WELLNESS VISIT-INCLUDES PPPS SUBSEQUE*   2. Acute UTI  URINALYSIS,CULTURE IF INDICATED    [place] ANNUAL WELLNESS VISIT-INCLUDES PPPS SUBSEQUE*   3. CKD (chronic kidney disease) stage 3, GFR 30-59 ml/min  [place] ANNUAL WELLNESS VISIT-INCLUDES PPPS SUBSEQUE*   4. Benign hypertensive heart disease without heart failure  [place] ANNUAL WELLNESS VISIT-INCLUDES PPPS SUBSEQUE*   5. Atherosclerosis of native coronary artery of native heart without angina pectoris  [place] ANNUAL WELLNESS VISIT-INCLUDES PPPS SUBSEQUE*   6. Paroxysmal atrial fibrillation (CMS-HCC)  [place] ANNUAL WELLNESS VISIT-INCLUDES PPPS SUBSEQUE*   7. 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  Diabetic nephropathy associated with type 2 diabetes mellitus (CMS-HCC)  [place] ANNUAL WELLNESS VISIT-INCLUDES PPPS SUBSEQUE*   9. Diabetic polyneuropathy associated with type 2 diabetes mellitus (CMS-HCC)  [place] ANNUAL WELLNESS VISIT-INCLUDES PPPS SUBSEQUE*   10. Essential hypertension, benign  [place] ANNUAL WELLNESS VISIT-INCLUDES PPPS SUBSEQUE*   11. Mixed hyperlipidemia  [place] ANNUAL WELLNESS VISIT-INCLUDES PPPS SUBSEQUE*   12. Idiopathic chronic gout of multiple sites without tophus  [place] ANNUAL WELLNESS VISIT-INCLUDES PPPS SUBSEQUE*   13. Left hemiparesis (CMS-HCC)  [place] ANNUAL WELLNESS VISIT-INCLUDES PPPS SUBSEQUE*   14. Wheelchair dependent  [place] ANNUAL WELLNESS VISIT-INCLUDES PPPS SUBSEQUE*   15. Normocytic hypochromic anemia  [place] ANNUAL WELLNESS VISIT-INCLUDES PPPS SUBSEQUE*   16. Moderate episode of recurrent major depressive disorder (CMS-HCC)  [place] ANNUAL WELLNESS VISIT-INCLUDES PPPS SUBSEQUE*   17. Psychophysiological insomnia  [place] ANNUAL WELLNESS VISIT-INCLUDES PPPS SUBSEQUE*         Services suggested: No services needed at this time  Health Care Screening: Age-appropriate preventive services recommended by USPTF and ACIP covered by Medicare were discussed today. Services ordered if indicated and agreed upon by the patient.  Referrals offered: PT/OT/Nutrition counseling/Behavioral Health/Smoking cessation as per orders if indicated.    Discussion today about general wellness and lifestyle habits:    · Prevent falls and reduce trip hazards; Cautioned about securing or removing rugs.  · Have a working fire alarm and carbon monoxide detector;   · Engage in regular physical activity and social activities       Follow-up: Return in about 3 months (around 6/18/2018) for Coordination of care.

## 2018-03-26 NOTE — OP THERAPY DAILY TREATMENT
"  Outpatient Occupational Therapy  DAILY TREATMENT     [facility] Occupational Therapy [address]  [address]  [place] [address]  Phone:  [phone]  Fax:  [phone]    Date: 03/26/2018    Patient: Av Bob  YOB: 1945  MRN: [id number]      Time Calculation  Start time: 0100  Stop time: 0200 Time Calculation (min): 60 minutes     Chief Complaint: Extremity Weakness    Visit #: 16    SUBJECTIVE: \"I have a UTI\"    OBJECTIVE:  Current objective measures:   LUE strength:  Scapular strength 2-/5  Shoulder ff/abd: 2-/5  Shoulder IR: 4/5  Shoulder ER: 4-/5  Left elbow flexion/extension: 4/5  Left wrist flexion/extension: 4/5  Left digit flexion: 3+/5  Left digit extension: digits1-3 are 3/5 strength; digits 4-5 are 3-/5 strength  Grade 2 flexor tone at wrist, Grade 1+ at digits   strength:  Left: 12 lbs;  Right 63 lbs  Sensation: WFL  PROM: WNL except shoulder ff/abd to 150 limited by pain  Balance: sitting: Good.  Standing: static Fair minus, Dynamic: Poor  ADLs: supervised dressing/bathing/grooming/feeding.  Min assist with toileting for hygiene via wife.  Toilet transfer: Mod I with transfer pole  OT Functional Assessment Tool Used: Quick Dash  OT Functional Assessment Score: 50% Disability     Therapeutic Treatments and Modalities:    1. Neuromuscular Re-education (CPT 56635)    2. Therapeutic Activities (CPT 71745)    Therapeutic Treatments and Modalities Summary: KASSY NM re-ed, passive sustained stretching, WB, facilitory techniques to increase volitional muscle activity, re-tested all areas above, demonstrated exercises to wife for HEP along with written hand-out, discussed pt's current abilities and importance of pt initiating and performing ADLs with the minimal amount of assistance via wife.  Pt/wife educated on allowing more time in the am for pt to complete his self-care routine including toileting.  Min assist stand pivot transfer w/c <-> mat, min assist " supine <-> sit on mat    Time-based treatments/modalities:    Therapeutic activity minutes (CPT 97081): 15 minutes    Neuromusc re-ed minutes (CPT 57388): 45 minutes      ASSESSMENT:   Response to treatment: Pt has actively participated in skilled OT program and has made good progress towards the goals set in his initial plan of care.  Pt has made steady gains in his LUE neuromuscular recovery in strength, motor control, and soft tissue mobility despite persistent distal flexor spasticity.  Pt is capable of performing his self-care routine with supervision despite some self-limiting behavior.  Pt is independent in facilitory techniques to improve volitional muscle activity in his left wrist/digits.  Pt reports he is independent with his HEP.  Wife was further instructed on his HEP with good understanding.  At this point, pt is safe to d/c to his HEP.        PLAN/RECOMMENDATIONS:   Plan for treatment: no further treatment needed.  Planned interventions for next visit: D/C OT

## 2018-03-26 NOTE — LETTER
March 26, 2018    Mitchell Pantoja M.D.  [address]  [address]  [facility] [address]      Re:  Av Bob          Dear Dr. Pantoja,    It was a pleasure seeing your patient, Av Bob, on 3/26/2018 for his final therapy visit.     Please find enclosed a copy of the patient's discharge summary from outpatient physical therapy services.          On behalf of the staff at [facility], we would like to thank you for your referrals.  We appreciate your confidence in our clinic and will continue to work hard to provide the best outcomes for your patients.    We sincerely enjoy treating your patients and hope that you have been happy with their care.  Thank you again, and please call with any questions, concerns or ways that we can best meet your needs.        Sincerely,          Debby Giles, PT, DPT    No Recipients              Outpatient Physical Therapy  DISCHARGE SUMMARY NOTE      [address]  [address]  [facility] [address]  Phone:  [phone]  Fax:  [phone]    Date of Visit: 03/26/2018    Patient: Av Bob  YOB: 1945  MRN: [id number]     Referring Provider: Mitchell Pantoja M.D.  [address]  [facility] [address]  [place] [address]   Referring Diagnosis Personal history of transient ischemic attack (TIA), and cerebral infarction without residual deficits [Z86.73];Dependence on wheelchair [Z99.3]     Physical Therapy Occurrence Codes    Date physical therapy care plan established or reviewed:  12/11/17   Date physical therapy treatment started:  11/20/17          Functional Limitation G-Codes and Severity Modifiers      Goal: Mobility: Goal (): CJ   Discharge: Mobility: Discharge (): CK       Your patient is being discharged from Physical Therapy with the following comments:   · Progress plateau    Comments:  Patient was seen for 25 visits from last year extending into this  current year.  At this time, recommending d/c from PT to continue HEP, while patient is demonstrating progress plateau. Patient is currently primarily using W/C and platform FWW. During the course of the tx, patient was fitted for L AFO which has improved safety with mobility.     Short Term Goals:   Patient able to walk 150 feet with assistive device @ SBA (not met)  Patient out of bed >75% of day (met)   Independent with transfers using FWW/W/C (not met, min/modA depending on surface height)        Long Term Goals:    Independent with all ADLs ( not met at this time)    Ambulation 300 feet Independently with A.D. (not met at this time)       Limitations Remaining:  Patient continues to demonstrate L sided weakness and requiring Lesia/supervised with transfers.     Recommendations:  D/c from PT at this time. Recommend additional referral for re-assessment in a few months if patient noticing improvement.     Debby Giles, PT, DPT    Date: 3/26/2018

## 2018-03-27 DIAGNOSIS — I10 ESSENTIAL HYPERTENSION: ICD-10-CM

## 2018-03-27 RX ORDER — METOPROLOL SUCCINATE 200 MG/1
200 TABLET, EXTENDED RELEASE ORAL DAILY
Qty: 90 TAB | Refills: 3 | Status: SHIPPED | OUTPATIENT
Start: 2018-03-27 | End: 2018-07-03 | Stop reason: SDUPTHER

## 2018-03-29 PROBLEM — R80.9 MICROALBUMINURIA DUE TO TYPE 2 DIABETES MELLITUS (HCC): Status: RESOLVED | Noted: 2018-01-11 | Resolved: 2018-03-29

## 2018-03-29 PROBLEM — E11.29 MICROALBUMINURIA DUE TO TYPE 2 DIABETES MELLITUS (HCC): Status: RESOLVED | Noted: 2018-01-11 | Resolved: 2018-03-29

## 2018-03-29 PROCEDURE — G8984 CARRY CURRENT STATUS: HCPCS | Mod: CK

## 2018-03-29 PROCEDURE — G8985 CARRY GOAL STATUS: HCPCS | Mod: CJ

## 2018-03-29 PROCEDURE — G8986 CARRY D/C STATUS: HCPCS | Mod: CK

## 2018-03-29 NOTE — ASSESSMENT & PLAN NOTE
This problem is chronic, stable, and monitored appropriately by history/labs/imaging as needed, and is uncontrolled but improving on the current regimen.  Please continue current regimen.

## 2018-03-29 NOTE — ASSESSMENT & PLAN NOTE
Patient with acute UTI that was discovered by his nephrologist on routine UA, then confirmed by urine culture. Patient is on appropriate antibiotics at this time, and is taking them as directed. Patient's symptoms are overall improving.    ROS negative for fevers, chills, nausea, emesis, flank pain, dysuria, hematuria.

## 2018-03-29 NOTE — ASSESSMENT & PLAN NOTE
This problem is chronic, uncontrolled, but improving, and monitored appropriately by history/labs/imaging.  Please continue current regimen with PT + home exercises

## 2018-03-29 NOTE — ASSESSMENT & PLAN NOTE
This problem is chronic, stable, and monitored appropriately by history/labs/imaging as needed, and is well-controlled on the current regimen.  Please continue current regimen including seeing Nephrologist

## 2018-03-29 NOTE — ASSESSMENT & PLAN NOTE
This problem is chronic, stable, and monitored appropriately by history/labs/imaging as needed, and is well-controlled on the current regimen.  Please continue current regimen

## 2018-03-29 NOTE — ASSESSMENT & PLAN NOTE
Chronic, stable, controlled. Last echocardiogram was in October 2017. Hypertension is well controlled. Diabetes nearly well controlled.

## 2018-03-29 NOTE — ASSESSMENT & PLAN NOTE
This problem is chronic, mildly well-controlled, and monitored appropriately by history/labs/imaging as needed, and is well-controlled on the current regimen.  Please continue current regimen, which includes seeing his nephrologist for diabetic nephropathy, as well as medications as directed.

## 2018-03-29 NOTE — ASSESSMENT & PLAN NOTE
Chronic, stable, controlled. Patient is being followed by his cardiologist for this condition as well as his hypertensive heart disease, and atrial fibrillation.

## 2018-03-29 NOTE — ASSESSMENT & PLAN NOTE
Chronic, stable, well controlled. Patient taking medications as directed, including chronic anticoagulation.

## 2018-03-30 NOTE — OP THERAPY DISCHARGE SUMMARY
Outpatient Occupational Therapy  DISCHARGE SUMMARY NOTE    [facility] Occupational Therapy [address].  [address]  [place] [address]  Phone:  [phone]  Fax:  [phone]    Date of Visit: 03/26/2018    Patient: Av Bob  YOB: 1945  MRN: [id number]     Referring Provider: Mitchell Pantoja M.D.  [address]  [address]  [place] [address]   Referring Diagnosis: Personal history of transient ischemic attack (TIA), and cerebral infarction without residual deficits [Z86.73];Dependence on wheelchair [Z99.3]     Occupational Therapy Occurrence Codes    Date of onset of impairment:  12/31/16   Date of occupational therapy care plan established or reviewed:  11/29/17   Date of occupational therapy treatment started:  11/29/17          Functional Limitation G-Codes and Severity Modifiers  OT Functional Assessment Tool Used: Quick Dash  OT Functional Assessment Score: 50% Disability   Goal: Carry: Goal  (): CJ   Discharge: Carry: Discharge (): CK       Your patient is being discharged from Occupational Therapy with the following comments:   · Goals partially met  · Progress plateau    Comments:  Pt has actively participated in skilled OT program and has made good progress towards the goals set in his initial plan of care.  Pt has made steady gains in his LUE neuromuscular recovery in strength, motor control, and soft tissue mobility despite persistent distal flexor spasticity.  Pt is capable of performing his self-care routine with supervision despite some self-limiting behavior.  Pt is independent in facilitory techniques to improve volitional muscle activity in his left wrist/digits.  Pt reports he is independent with his HEP.  Wife was further instructed on his HEP with good understanding.  At this point, pt is safe to d/c to his HEP.        Limitations Remaining:  See daily note from 3/26/18 for details    Recommendations:  D/C OT    Regino Mijares,  MS,OTR/L    Date: 3/29/2018

## 2018-04-03 ENCOUNTER — OFFICE VISIT (OUTPATIENT)
Dept: CARDIOLOGY | Facility: MEDICAL CENTER | Age: 73
End: 2018-04-03
Payer: MEDICARE

## 2018-04-03 VITALS
HEART RATE: 62 BPM | DIASTOLIC BLOOD PRESSURE: 80 MMHG | SYSTOLIC BLOOD PRESSURE: 114 MMHG | BODY MASS INDEX: 27.09 KG/M2 | OXYGEN SATURATION: 96 % | HEIGHT: 72 IN | WEIGHT: 200 LBS

## 2018-04-03 DIAGNOSIS — I10 ESSENTIAL HYPERTENSION, BENIGN: ICD-10-CM

## 2018-04-03 DIAGNOSIS — I25.10 ATHEROSCLEROSIS OF NATIVE CORONARY ARTERY OF NATIVE HEART WITHOUT ANGINA PECTORIS: ICD-10-CM

## 2018-04-03 DIAGNOSIS — C83.30 DIFFUSE LARGE CELL NON-HODGKIN'S LYMPHOMA (HCC): ICD-10-CM

## 2018-04-03 DIAGNOSIS — G81.94 LEFT HEMIPARESIS (HCC): ICD-10-CM

## 2018-04-03 DIAGNOSIS — I73.9 PERIPHERAL VASCULAR DISEASE (HCC): ICD-10-CM

## 2018-04-03 DIAGNOSIS — I48.0 PAROXYSMAL ATRIAL FIBRILLATION (HCC): ICD-10-CM

## 2018-04-03 DIAGNOSIS — N18.30 CKD (CHRONIC KIDNEY DISEASE) STAGE 3, GFR 30-59 ML/MIN (HCC): ICD-10-CM

## 2018-04-03 DIAGNOSIS — I63.40 CEREBROVASCULAR ACCIDENT (CVA) DUE TO EMBOLISM OF CEREBRAL ARTERY (HCC): ICD-10-CM

## 2018-04-03 DIAGNOSIS — E78.5 DYSLIPIDEMIA: ICD-10-CM

## 2018-04-03 DIAGNOSIS — E11.3293 CONTROLLED TYPE 2 DIABETES MELLITUS WITH BOTH EYES AFFECTED BY MILD NONPROLIFERATIVE RETINOPATHY WITHOUT MACULAR EDEMA, WITHOUT LONG-TERM CURRENT USE OF INSULIN (HCC): ICD-10-CM

## 2018-04-03 PROCEDURE — 99215 OFFICE O/P EST HI 40 MIN: CPT | Performed by: INTERNAL MEDICINE

## 2018-04-03 NOTE — PROGRESS NOTES
Cardiology Follow-up Consultation Note    Date of note:    4/3/2018    Primary Care Provider: Mitchell Pantoja M.D.  Referring Provider: Leonardo.     Patient Name: Av Bob   YOB: 1945  MRN:              [id number]    Chief Complaint: chest pain    History of Present Illness: Av Bob is a 73 y.o. male whose current medical problems include CKD stage III, hypertension, CAD  (GIOVANNA to RCA in '97, GIOVANNA X2 to LAD and GIOVANNA X to OM in '09), atrial fibrillation, diabetes, dyslipidemia, gout, CVA 12/2016,  non-hodgkins lymphoma c/b brain lesions with resultant left hemiparesis s/p chemotherapy and depression who is here for follow-up.     Last seen by Lorie Figueredo 1/5/2018, and by Dr. Patterson on 12/27/2017.     Interim Events:  In terms of his CVA, this was in 2016, and while he was on aspirin and plavix at [facility].  This was in the setting of active lymphoma.    He was also admitted to [facility] again in 5/2017 for sepsis and was noted to have atrial fibrillation at this time. He has no noted recurrence and no episodes before that.  He has never been on anticoagulation for Cva prevention.     In terms of his NHL, he sees Dr. Noel, and his last PET scan showed he was cancer free.     Right leg wounds, currently healing. Evaluated by Dr. Terry for bilateral tibial artery stenosis, and decided against surgery at this time.     ROS  Constitution: Negative for chills, fever and night sweats.   HENT: Negative for nosebleeds.    Eyes: Negative for vision loss in left eye and vision loss in right eye.   Respiratory: Negative for hemoptysis.    Gastrointestinal: Negative for hematemesis, hematochezia and melena.   Genitourinary: Negative for hematuria.       Past Medical History:   Diagnosis Date   • Acute respiratory failure with hypoxia (CMS-HCC) 5/20/2017   • CAD (coronary artery disease)     GIOVANNA to RCA in '97, GIOVANNA X2 to LAD and GIOVANNA X2 to OM in  '09   • Cancer (CMS-HCC)     2017; chemo lympoma   • Cataract    • Cerebrovascular accident (CVA) (CMS-HCC) 12/30/2016    Left arm weakness  etiology of stroke not established, lymphoma discovered on MRI evaluation of stroke, L hemiparesis much worse after acute infectious illness in mid 2017, but no specific diagnosed recurrent neurological etiology, all at [facility]   • CKD (chronic kidney disease) stage 3, GFR 30-59 ml/min    • Controlled gout 2014   • Coronary atherosclerosis of native coronary artery     S/P PTCA (percutaneous transluminal coronary angioplasty), RCA, 5/1997, patent on cath 7/10/2009 at the time of interventions on his left anterior descending and circumflex coronary arteries   • Depression    • Diabetes (CMS-HCC)    • Enterococcal septicemia (CMS-HCC) 8/12/2017   • Hypertension    • Hypokalemia 2012    controlled with combination of ACE inhibitor or ARB plus spironolactone   • Hypomagnesemia 08/12/2017    etiology uncertain   • 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 Barry   • NSTEMI (non-ST elevated myocardial infarction) (CMS-HCC) 07/18/2017    complicating UTI with sepsis   • Pain    • Polyneuropathy in diabetes(357.2) 9/11/2013   • Septic shock (CMS-HCC) 5/20/2017   • Skin ulcer of calf (CMS-HCC) 2015    Right, Dr. Terry and wound care   • Stroke (CMS-HCC) 2016    left sided weakness   • Urinary bladder disorder    • Urinary incontinence    • Wound of left leg 2012    Requiring surgery and debridment, Dr. Moore         Past Surgical History:   Procedure Laterality Date   • CYSTOSCOPY STENT PLACEMENT Left 2/12/2018    Procedure: CYSTOSCOPY  ;  Surgeon: Rey Barry M.D.;  Location: SURGERY [facility];  Service: Urology   • URETEROSCOPY Left 2/12/2018    Procedure: URETEROSCOPY- FLEXIBLE  ;  Surgeon: Rey Barry M.D.;  Location: [facility]  ORS;  Service: Urology   • LASERTRIPSY Left 2/12/2018    Procedure: LASERTRIPSY - LITHO  ;  Surgeon: Rey Barry M.D.;  Location: SURGERY [facility];  Service: Urology   • WOUND CLOSURE GENERAL  4/3/2012    Performed by GERHARD GILBERT at SURGERY SAME DAY [facility]S   • DAGOBERTO BY LAPAROSCOPY  1998   • ANGIOPLASTY  1997    RCA followed by other stents as noted above.    • CARDIAC CATH     • CATARACT EXTRACTION WITH IOL      bilateral   • LITHOTRIPSY     • TONSILLECTOMY AND ADENOIDECTOMY           Current Outpatient Prescriptions   Medication Sig Dispense Refill   • metoprolol (TOPROL-XL) 200 MG XL tablet Take 1 Tab by mouth every day. 90 Tab 3   • Blood Glucose Monitoring Suppl Supplies Misc One Touch ultra glucometer strips for blood sugar check up to 5 times a day 450 Each 2   • atorvastatin (LIPITOR) 20 MG Tab Take 1 Tab by mouth every day. 90 Tab 2   • allopurinol (ZYLOPRIM) 300 MG Tab Take 150 mg by mouth every day.     • ascorbic acid (VITAMIN C) 500 MG tablet Take 500 mg by mouth every day.     • Cholecalciferol (VITAMIN D) 2000 units Cap Take 1 Cap by mouth every evening.     • insulin lispro (HUMALOG) 100 UNIT/ML Solution Inject 10-14 Units as instructed 3 times a day before meals. 10 units every meal up to , then add 2 units for Every 50 above 150 BG     • Acetaminophen (TYLENOL PO) Take 1,250 mg by mouth 2 times a day as needed. 625 mg each tab     • NON SPECIFIED Apply 1 Application to affected area(s) 3 times a day as needed. Therawax     • clopidogrel (PLAVIX) 75 MG Tab Take 1 Tab by mouth every day. 90 Tab 3   • fluoxetine (PROZAC) 40 MG capsule Take 1 Cap by mouth every day. 90 Cap 1   • Insulin Glargine (TOUJEO SOLOSTAR) 300 UNIT/ML Solution Pen-injector Inject 15 Units as instructed every evening. 3 PEN 6   • magnesium oxide (MAG-OX) 400 MG Tab Take 400 mg by mouth 3 times a day.     • fenofibrate (TRICOR) 145 MG Tab Take 1 Tab by mouth every day. 90 Tab 3   • losartan (COZAAR) 50 MG  Tab Take 1 Tab by mouth every day. 30 Tab 6   • spironolactone (ALDACTONE) 50 MG Tab Take 1 Tab by mouth every day. 90 Tab 3   • aspirin 81 MG tablet Take 81 mg by mouth every day.     • CENTRUM SILVER PO Take 1 Tab by mouth every day.     • cefdinir (OMNICEF) 300 MG Cap TK 1 C PO BID  0     No current facility-administered medications for this visit.          Allergies   Allergen Reactions   • Diphenhydramine Hcl Anxiety     Pt is able to tolerate  Mg benadryl with less anxiety   • Lorazepam Unspecified     Disorientation   • Ciprofloxacin      Rash,stomach ache         Family History   Problem Relation Age of Onset   • Heart Disease Father      CAD   • Diabetes Father    • Cancer Mother    • Psychiatry Mother      Depression   • Depression Mother    • Kidney stones Brother    • Heart Disease Brother    • Psychiatry Brother      Depression   • Depression Brother    • Suicide Attempts Other    • Psychiatry Other      autism         Social History     Social History   • Marital status:      Spouse name: N/A   • Number of children: N/A   • Years of education: N/A     Occupational History   • Not on file.     Social History Main Topics   • Smoking status: Never Smoker   • Smokeless tobacco: Never Used   • Alcohol use No   • Drug use: No   • Sexual activity: Not Currently     Partners: Female     Other Topics Concern   • Not on file     Social History Narrative   • No narrative on file         Physical Exam:  Ambulatory Vitals  Blood pressure 114/80, pulse 62, height 1.829 m (6'), weight 90.7 kg (200 lb), SpO2 96 %.   Oxygen Therapy:  Pulse Oximetry: 96 %  BP Readings from Last 4 Encounters:   04/03/18 114/80   03/26/18 126/72   03/15/18 124/76   02/12/18 (!) 166/88       Weight/BMI: Body mass index is 27.12 kg/m².  Wt Readings from Last 4 Encounters:   04/03/18 90.7 kg (200 lb)   03/26/18 92.4 kg (203 lb 9.6 oz)   03/15/18 87.1 kg (192 lb)   02/12/18 87.9 kg (193 lb 12.6 oz)       General: Well appearing and in  no apparent distress  Eyes: nl conjunctiva  ENT: OP clear, normal external appearance of nose and ears  Neck: JVP <8 cm H2O, no carotid bruits  Lungs: normal respiratory effort, CTAB  Heart: RRR, no murmurs, no rubs or gallops, no edema bilateral lower extremities. No LV/RV heave on cardiac palpatation. 2+ bilateral radial pulses.    Abdomen: soft, non tender, non distended, no masses, normal bowel sounds.  No HSM.  Extremities/MSK: no clubbing, no cyanosis  Neurological: decreased sensation on the left, left extremity weakness.   Psychiatric: Appropriate affect, A/O x 3, intact judgement and insight  Skin: Warm extremities    Lab Data Review:  Lab Results   Component Value Date/Time    CHOLSTRLTOT 110 10/06/2017 09:50 AM    LDL 45 10/06/2017 09:50 AM    HDL 42 10/06/2017 09:50 AM    TRIGLYCERIDE 117 10/06/2017 09:50 AM       Lab Results   Component Value Date/Time    SODIUM 137 03/08/2018 11:11 AM    POTASSIUM 4.1 03/08/2018 11:11 AM    CHLORIDE 106 03/08/2018 11:11 AM    CO2 23 03/08/2018 11:11 AM    GLUCOSE 162 (H) 03/08/2018 11:11 AM    BUN 35 (H) 03/08/2018 11:11 AM    CREATININE 1.81 (H) 03/08/2018 11:11 AM    CREATININE 1.4 05/28/2008 05:42 PM    BUNCREATRAT 10 03/27/2017 02:11 PM     Lab Results   Component Value Date/Time    ALKPHOSPHAT 65 02/01/2018 02:53 PM    ASTSGOT 21 02/01/2018 02:53 PM    ALTSGPT 18 02/01/2018 02:53 PM    TBILIRUBIN 0.6 02/01/2018 02:53 PM      Lab Results   Component Value Date/Time    WBC 11.7 (H) 02/01/2018 02:53 PM     No components found for: HBGA1C  No components found for: TROPONIN  No components found for: BNP      Cardiac Imaging and Procedures Review:    EKG dated 12/27/2017:  SINUS RHYTHM   RIGHT AXIS DEVIATION, POSSIBLY DUE TO ELECTRODE PLACEMENT AND POSITION   BORDERLINE T ABNORMALITIES, ANT-LAT LEADS, UNCHANGED   BORDERLINE PROLONGED QT INTERVAL   Compared to ECG 09/22/2017 13:03:34   NO SIGNIFICANT CHANGE ALLOWING FOR ELECTRODE PLACEMENT (LEFT ARM AND FOOT)     Echo  dated 10/5/2017:   CONCLUSIONS  No prior study is available for comparison.   Normal left ventricular chamber size.  Mild concentric left ventricular hypertrophy.  Left ventricular ejection fraction is visually estimated to be 65%.  Aortic sclerosis without stenosis.  Trace aortic insufficiency.  Trace pulmonic insufficiency.  Normal pericardium without effusion.  Ascending aorta diameter is  3.0cm      Nuclear Perfusion Imaging (1/2018):   Myocardial Perfusion   Report   NUCLEAR IMAGING INTERPRETATION   The LV is mildly dilated with global hypokinesis.  Calculated LV EF is 49%.     There is a small region of decreased perfusion in the inferior-lateral wall    with a mild amount of inducible ischemia.   ECG INTERPRETATION   Negative stress ECG for ischemia.      Stress test 6/2017 ([facility])    Findings:   Small in size moderate in intensity fixed apical defect. Computer analysis also suggests mild in intensity small in size diminished counts along inferior wall of left ventricle which appears to improve at time of rest. Summed stress score is   7. Summed rest score is 1. Gross hypomotility of the left ventricle is noted with disordered contraction evident. Global hypokinesis is noted.      [facility] (2009): at [facility], last stent.     Radiology test Review:  CXR: 1/2018   FINDINGS:  The heart is normal in size.  No pulmonary infiltrates or consolidations are noted.  No pleural effusions are appreciated.    Vascular US 2/2018:  Vascular Laboratory   Conclusions   There is no evidence of arterial disease demonstrated (PADMINI is .9-1.0).    Absent flow within the Left VIVIENNE.     Vascular Laboratory   CONCLUSIONS   Right Lower Extremity-   Normal flow from the common femoral artery extending distally to the    popliteal artery. Flow within the posterior tibial artery is brisk and    multiphasic.   Occlusion of the anterior tibial artery at the prox-mid level with    reconstitution of flow seen at the distal  level.     Left Lower Extremity-   Normal flow seen from the common femoral artery extending distally to the    popliteal artery.   Area of elevated velocities seen within the mid segment of the posterior    tibial artery. Consistent with >50% stenosis.   Occlusion of the anterior tibial artery at the mid-distal segment, minimal    flow reversal seen at the level of the ankle.      MRI 12/31/2016  7 mm acute/early subacute infarct is present in the right anterolateral medulla.  There is no significant mass effect or hemorrhage.  No other recent infarction.    12/31/2016:  Three sites of abnormal parenchymal enhancement are present.  *  At the previously seen right anterior pontine lesion, there is a 5 x 3 x 7 mm elongated enhancing peripheral pontine lesion.  *  There is a second 2 mm enhancing focus also in the right anterior david.  *  There is a third 3 mm lesion at the left globus pallidus internus.    These findings may represent metastatic disease.  The elongated morphology of the anterior peripheral pontine lesion is somewhat atypical for metastatic lesion, and could potentially represent enhancement of a subacute infarct.  Two other lesions could also, in theory, represent subacute enhancing reperfusion of lacunar infarctions. Anecdotally, this would be an unusual finding in lacunar infarctions. Note that post infarct enhancement typically occurs approximately 1 week after infarction and resolves within 12 weeks or less.       Head CTA 12/30/2016:  1. There are plaque formations identified in both carotid bifurcations   (right greater than left). This causes approximately 50% stenosis on the   right and 10-20% stenosis on the left in the proximal internal carotid   arteries. The remainder of the carotid   vasculature is unremarkable.  2. The vertebral arteries are within normal limits.  3. No dissection.  4. A 5.5 mm left thyroid lobe cyst/nodule is present.  5. A 10 x 13 mm partially calcified pulmonary  nodule is present in the   left upper lobe. There may be some internal fat density. Findings could   represent a benign pulmonary hamartoma. Recommend clinical correlation.  6. Moderate to advanced degenerative changes are present throughout the   cervical spine.      Medical Decision Makin. Atherosclerosis of native coronary artery of native heart without angina pectoris  S/p 5 stents, last in . Mild ischemia on two recent nuclear perfusion scans  -obtain [facility] records for last cardiac catheterization.  Currently without recurrent chest pain (had some atypical chest pain last year).  -continue aspirin/lipitor      2. 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  Last hgbA1c 8 (2018)    3. Essential hypertension, benign  Well controlled  -continue spironolactone 50mg PO Daily, losartan 50mg PO daily     4. Paroxysmal atrial fibrillation (CMS-HCC)  Self limited and one time in setting of sepsis. Discussed at length risks of recurrent CVA given his previous likely embolic event, and if he would like an ILR to confirm diagnosis or to start empiric anticoagulation.  He prefers the later. Discussed increased risk of bleeding.  -stop plavix 75mg PO daily, start rivaroxban 15mg PO daily.     5. CKD (chronic kidney disease) stage 3, GFR 30-59 ml/min  Stable, followed by Dr. Jarvis  -[facility]    6. Diffuse large cell non-Hodgkin's lymphoma (CMS-HCC)  Followed by Dr. Noel, in remission per report with recent negative PET scan.    7. Left hemiparesis (CMS-HCC)  Acute 2016 at [facility].  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  Symptoms originally improved significantly with chemotherapy and he was walking well, and then hemiparesis worsened significantly after sepsis from UTI and  norovirus infection mid 2017.  Currently continuing physical therapy, but remains in a wheelchair.       8. Cerebrovascular accident (CVA) due to embolism of cerebral artery (CMS-HCC)  In addition to cerebral enhancing lesions thought to be lymphoma on MRI 12/2016, he was found to have a subacute infarct in the right anterolateral medulla.    -rivaroxaban for CVA prevention    9. Dyslipidemia  Lipitor, fenofibrate    10. Peripheral vascular disease (CMS-HCC)  Followed by Dr. Terry, thought to not be candidate for bialteral anterior tibial artery disease as the arteries reconstitute and his leg wounds are currently healing.  -continue aspirin.       Return in about 3 months (around 7/3/2018). with Lorie Figueredo, 6 months with me.     I spent 45 minutes with Mr. Av Allen Lisbeth during this appointment.  Over fifty percent was spent counseling the patient on their condition, best management practices, reviewing test results, risks and benefits of treatment and coordinating care.  Our discussion included the risks and benefits of anticoagulation for atrial fibrillation and the pathophysiology of his cardiovascular disease processes.  More than 50% of the time was spent Face-To-Face with the patient.      Osvaldo Tucker MD  [facility]  [place] [facility], [address]  [address] [address]  Phone: [phone]  Fax: [phone]

## 2018-04-05 ENCOUNTER — HOSPITAL ENCOUNTER (OUTPATIENT)
Dept: LAB | Facility: MEDICAL CENTER | Age: 73
End: 2018-04-05
Attending: INTERNAL MEDICINE
Payer: MEDICARE

## 2018-04-05 ENCOUNTER — NON-PROVIDER VISIT (OUTPATIENT)
Dept: WOUND CARE | Facility: MEDICAL CENTER | Age: 73
End: 2018-04-05
Attending: INTERNAL MEDICINE
Payer: MEDICARE

## 2018-04-05 ENCOUNTER — HOSPITAL ENCOUNTER (OUTPATIENT)
Facility: MEDICAL CENTER | Age: 73
End: 2018-04-05
Attending: FAMILY MEDICINE
Payer: MEDICARE

## 2018-04-05 DIAGNOSIS — N18.30 CKD (CHRONIC KIDNEY DISEASE) STAGE 3, GFR 30-59 ML/MIN (HCC): ICD-10-CM

## 2018-04-05 DIAGNOSIS — N39.0 ACUTE UTI: ICD-10-CM

## 2018-04-05 LAB
25(OH)D3 SERPL-MCNC: 24 NG/ML (ref 30–100)
ANION GAP SERPL CALC-SCNC: 8 MMOL/L (ref 0–11.9)
APPEARANCE UR: ABNORMAL
BACTERIA #/AREA URNS HPF: ABNORMAL /HPF
BILIRUB UR QL STRIP.AUTO: NEGATIVE
BUN SERPL-MCNC: 38 MG/DL (ref 8–22)
CALCIUM SERPL-MCNC: 9.6 MG/DL (ref 8.5–10.5)
CHLORIDE SERPL-SCNC: 103 MMOL/L (ref 96–112)
CO2 SERPL-SCNC: 25 MMOL/L (ref 20–33)
COLOR UR: YELLOW
CREAT SERPL-MCNC: 1.71 MG/DL (ref 0.5–1.4)
CULTURE IF INDICATED INDCX: YES UA CULTURE
EPI CELLS #/AREA URNS HPF: ABNORMAL /HPF
GLUCOSE SERPL-MCNC: 175 MG/DL (ref 65–99)
GLUCOSE UR STRIP.AUTO-MCNC: NEGATIVE MG/DL
KETONES UR STRIP.AUTO-MCNC: NEGATIVE MG/DL
LEUKOCYTE ESTERASE UR QL STRIP.AUTO: ABNORMAL
MICRO URNS: ABNORMAL
NITRITE UR QL STRIP.AUTO: POSITIVE
PH UR STRIP.AUTO: 6 [PH]
POTASSIUM SERPL-SCNC: 3.7 MMOL/L (ref 3.6–5.5)
PROT UR QL STRIP: 30 MG/DL
RBC # URNS HPF: ABNORMAL /HPF
RBC UR QL AUTO: ABNORMAL
SODIUM SERPL-SCNC: 136 MMOL/L (ref 135–145)
SP GR UR STRIP.AUTO: 1.01
UROBILINOGEN UR STRIP.AUTO-MCNC: 0.2 MG/DL
WBC #/AREA URNS HPF: ABNORMAL /HPF

## 2018-04-05 PROCEDURE — 87077 CULTURE AEROBIC IDENTIFY: CPT

## 2018-04-05 PROCEDURE — 80048 BASIC METABOLIC PNL TOTAL CA: CPT

## 2018-04-05 PROCEDURE — 36415 COLL VENOUS BLD VENIPUNCTURE: CPT

## 2018-04-05 PROCEDURE — 81001 URINALYSIS AUTO W/SCOPE: CPT

## 2018-04-05 PROCEDURE — 97602 WOUND(S) CARE NON-SELECTIVE: CPT

## 2018-04-05 PROCEDURE — 87186 SC STD MICRODIL/AGAR DIL: CPT

## 2018-04-05 PROCEDURE — 82306 VITAMIN D 25 HYDROXY: CPT

## 2018-04-05 PROCEDURE — 87086 URINE CULTURE/COLONY COUNT: CPT

## 2018-04-05 NOTE — WOUND TEAM
Advanced Wound Care  [facility]  [address]  [address]  [place] [address]  [phone] Fax: [phone]      Encounter Note  For Certification Period:01/24/2018 - 04/16/2018      Referring Physician: Trinidad Maguire  Primary Physician:     Dr. Mitchell Pantoja MD   Consulting Physicians:         Wound(s):S91.009s R lateral malleolus, L lateral foot - dry eschar/scab areas. Scab on L dorsal 2nd toe  Start of Care: 01/24/2018       Subjective:        HPI: Pt is a 72 year old diabetic gentleman with a hx of cardiovascular disease, past CVA with L hemiparesis who is referred by PCP for eval of necrotic areas on R lateral malleolus and L lateral foot. He also has scabbed areas on L dorsal 2nd toe and several small dry scabs on LLE. Pt is wc bound, transfers with assist.  He has PN and LOPS dez feet.              Pain: pt denies pain            Current Medications: no med changes per pt    Allergies: Diphenhydramine hcl; Lorazepam; Ciprofloxacin; and Nkda [no known drug allergy]      Objective:      Tests and Measures: Last A1c 01/15/2018 - 7.6 per EPIC. FBS today 177, yesterday 138 per spouse. Pt states it is usually <150.   Monofilament test reveals R - 1/10 sites sensate, L - 0/10 sites sensate - LOPS dez.     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 Arterial wound healing studies ordered BLE. PAR asked to schedule pt with Dr. Terry for vascular consult.    02/08/2018 - vascular study results from [facility] -   Findings   Bilateral.    Doppler waveform of the common femoral artery is of high amplitude and    triphasic.    Doppler waveforms at the ankle are brisk and biphasic.    Absent flow within  the Left VIVIENNE.     Arterial duplex scan was performed in accordance with lower extremity    arterial evaluation protocol - see separate report.    Lower Extremity   Arterial Duplex Report     Vascular Laboratory   CONCLUSIONS   Right Lower Extremity-   Normal flow from the common femoral artery extending distally to the    popliteal artery. Flow within the posterior tibial artery is brisk and    multiphasic.   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     Left Lower Extremity-   Normal flow seen from the common femoral artery extending distally to the    popliteal artery.   Area of elevated velocities seen within the mid segment of the posterior    tibial artery. Consistent with >50% stenosis.   Occlusion of the anterior tibial artery at the mid-distal segment, minimal    flow reversal seen at the level of the ankle.     ELIGIO MADRIGAL     Exam Date:     02/07/2018 14:07        Orthotic, protective, supportive devices: pt is wc bound secondary to L hemiparisis from CVA    Fall Risk Assessment (naomy all that apply with an X):             X   65 years or older                     Fall within the last 2 years, uses   X   Ambulatory devices   X   Loss of protective sensation in feet,          Use of prostethic/orthotic, years                     Presence of lower extremity/foot/toe amputation                   Taking medication that increases risk (per facility policy)  Verbal and written fall prevention info given. Pt is wc bound and transfers with assist.     Wound Characteristics                                                    Location:R lateral malleolus   Initial Evaluation  Date:01/24/2018 Encounter Date: 04/05/2018   Tissue Type and %: 100% dry black scab/eschar 90% adherent tan, 10% pink   Periwound: intact Mild erythema   Drainage: None Scant yellow/honey   Exposed structures None BAN   Wound Edges:   Closed with scab/eschar Open    Odor: None None   S&S of Infection:   None None   Edema: None None   Sensation: PN - LOPS LOPS               Measurements: Initial Evaluation  Date:01/24/2018 Encounter Note 04/05/2018   Length (cm) 0.9 0.4   Width (cm) 0.9 0.5   Depth (cm) na BAN   Area (cm2) 0.81cm2 0.20 cm2   Tract/undermine na BAN              Wound Characteristics                                                    Location:   L dorsal 2nd toe Initial Evaluation  Date:  03/05/2018 Encounter Note 04/05/2018   Tissue Type and %: 100% dry yellow/eschar 90% adherent tan, 10% pink    Periwound: Mild erythema Intact   Drainage: None Scant yellow/honey   Exposed structures BAN None   Wound Edges:   Closed Open   Odor: None None   S&S of Infection:   None None   Edema: Local None   Sensation: LOPS LOPS               Measurements: L dorsal 2nd toe   Initial Evaluation  Date: 03/05/2018 Encounter Note 04/05/2018   Length (cm) 0.7 0.3   Width (cm) 0.6 0.4   Depth (cm) BAN BAN   Area (cm2) 0.42 cm2 0.16 cm2   Tract/undermine BAN BAN              Procedures:     Debridement : Non selective with NS gauze   Cleansed with: NS/Gauze                                                                       Periwound protected with: skin prep   Primary dressing: Medihoney gel to both wounds   Secondary Dressing: Nonadhesive foams secured with hypafix   Other: Komprex foam horseshoe secured with hypafix on R lateral malleolus     Patient Education: POC and wound progress discussed with pt and spouse. Both pt and spouse knowledgeable on s/s of infection and when to go to ER.     03/05/2018 - Dr. Terry reviewed vascular study results with pt and wife. No further procedure or intervention at this time. R lateral malleolus wound scab removed by Dr. Terry and wound to be dressed with medihoney gel per Dr. Terry. Dr. Terry provided wife with a sample of medihoney gel to change at home between [facility] visits. Pt and wife is known to Dr. Terry, and wife has  changed pt's dressing in past as well. Reviewed s/s of infection and when to go to ER, pt and wife verbalizes understanding.    Professional Collaboration: none today    Assessment:      Wound etiology: DFU, possible arterial disease    Wound Progress:  Wound slightly smaller per measurement and min viable tissue visible    Rationale for Treatment: Medihoney to provide moist wound environment, and facilitate autolytic debridement; Komprex horseshoes to relieve pressure from areas.    Patient tolerance/compliance: Pt tolerated care well. Compliant with POC and appointments.    Complicating factors: DM, possible PVD    Need for ongoing Advanced Wound Care services:continued skilled wound care for debridement as needed, dressing management and skilled clinical observation to prevent complications and expedite healing.        Plan:      Treatment Plan and Recommendations: keep areas clean and dry until arterial status has been determined. Pt to get arterial studies asap    Diagnosis/ICD10: S91.009s R lateral malleolus, L lateral foot - dry eschar/scab areas. Scab on L dorsal 2nd toe    Procedures/CPT: nonselective debridement 53652    Frequency: 1 x week      Treatment Goals: STG 2 Weeks  LTG 4 Weeks   Granulation Tissue: Pending vascular consultation %   Decrease Necrotic Tissue to: % %   Wound Phase:      Decrease Size by: % %   Periwound:      Decrease tracts/undermining by: % %   Decrease Pain:          At the time of each visit a thorough assessment of the patient is completed to assure the  appropriateness of our plan of care.  The dressings or modalities may need to be adapted   from the original plan to address any significant changes in the wound environment.

## 2018-04-07 DIAGNOSIS — N39.0 ACUTE UTI: ICD-10-CM

## 2018-04-08 ENCOUNTER — PATIENT MESSAGE (OUTPATIENT)
Dept: MEDICAL GROUP | Facility: MEDICAL CENTER | Age: 73
End: 2018-04-08

## 2018-04-08 RX ORDER — CEFDINIR 300 MG/1
300 CAPSULE ORAL 2 TIMES DAILY
Qty: 14 CAP | Refills: 0 | Status: SHIPPED | OUTPATIENT
Start: 2018-04-08 | End: 2018-04-15

## 2018-04-12 ENCOUNTER — NON-PROVIDER VISIT (OUTPATIENT)
Dept: WOUND CARE | Facility: MEDICAL CENTER | Age: 73
End: 2018-04-12
Attending: INTERNAL MEDICINE
Payer: MEDICARE

## 2018-04-12 ENCOUNTER — TELEPHONE (OUTPATIENT)
Dept: CARDIOLOGY | Facility: MEDICAL CENTER | Age: 73
End: 2018-04-12

## 2018-04-12 ENCOUNTER — OFFICE VISIT (OUTPATIENT)
Dept: NEPHROLOGY | Facility: MEDICAL CENTER | Age: 73
End: 2018-04-12
Payer: MEDICARE

## 2018-04-12 VITALS
TEMPERATURE: 97.8 F | BODY MASS INDEX: 27.09 KG/M2 | WEIGHT: 200 LBS | HEART RATE: 66 BPM | RESPIRATION RATE: 14 BRPM | HEIGHT: 72 IN | DIASTOLIC BLOOD PRESSURE: 70 MMHG | SYSTOLIC BLOOD PRESSURE: 130 MMHG | OXYGEN SATURATION: 98 %

## 2018-04-12 DIAGNOSIS — N39.0 RECURRENT UTI: ICD-10-CM

## 2018-04-12 DIAGNOSIS — R31.9 HEMATURIA, UNSPECIFIED TYPE: ICD-10-CM

## 2018-04-12 DIAGNOSIS — R19.5 DARK STOOLS: ICD-10-CM

## 2018-04-12 DIAGNOSIS — E55.9 VITAMIN D DEFICIENCY: ICD-10-CM

## 2018-04-12 DIAGNOSIS — R80.9 MICROALBUMINURIA DUE TO TYPE 2 DIABETES MELLITUS (HCC): ICD-10-CM

## 2018-04-12 DIAGNOSIS — N20.0 NEPHROLITHIASIS: ICD-10-CM

## 2018-04-12 DIAGNOSIS — N18.30 CKD (CHRONIC KIDNEY DISEASE) STAGE 3, GFR 30-59 ML/MIN (HCC): ICD-10-CM

## 2018-04-12 DIAGNOSIS — E11.29 MICROALBUMINURIA DUE TO TYPE 2 DIABETES MELLITUS (HCC): ICD-10-CM

## 2018-04-12 DIAGNOSIS — D64.9 ANEMIA, UNSPECIFIED TYPE: ICD-10-CM

## 2018-04-12 DIAGNOSIS — I10 ESSENTIAL HYPERTENSION, BENIGN: ICD-10-CM

## 2018-04-12 PROCEDURE — 97597 DBRDMT OPN WND 1ST 20 CM/<: CPT

## 2018-04-12 PROCEDURE — 99214 OFFICE O/P EST MOD 30 MIN: CPT | Performed by: INTERNAL MEDICINE

## 2018-04-12 ASSESSMENT — ENCOUNTER SYMPTOMS
WEIGHT LOSS: 0
COUGH: 0
NAUSEA: 0
PALPITATIONS: 0
CHILLS: 0
HEMOPTYSIS: 0
FLANK PAIN: 0
ORTHOPNEA: 0
ABDOMINAL PAIN: 0
FEVER: 0
WHEEZING: 0
SHORTNESS OF BREATH: 0
VOMITING: 0
HEARTBURN: 0
EYES NEGATIVE: 1
BACK PAIN: 0
DIARRHEA: 0
MYALGIAS: 0
NECK PAIN: 0

## 2018-04-12 NOTE — PROGRESS NOTES
Subjective:      Av Bob is a 72 y.o. male who presents with Follow-Up and Chronic Kidney Disease            Chronic Kidney Disease   Pertinent negatives include no abdominal pain, chest pain, chills, coughing, fever, myalgias, nausea, neck pain or vomiting.     Av is coming today for f/u of CKD III, microalbuminuria  Has long term hx/of Nephrolithiasis -f/u with Urologist -  Baseline creatinine level at 1.2's - worse to 1.8! -improved to 1.7  Diagnosed with left renal obstructing kidney stone -treated with lithotripsy  (+) for recurrent UTI, treated for Candida Glabrata prior surgery  Repeated urine culture positive for Enterobacter x 2 on Abx  C/o fatigue, poor appetite  Started on Xarelto -notice hematuria  No difficulties to urinate.  No dysuria  No flank pain  HTN: BP well controlled    Review of Systems   Constitutional: Positive for malaise/fatigue. Negative for chills, fever and weight loss.   HENT: Negative.    Eyes: Negative.    Respiratory: Negative for cough, hemoptysis, shortness of breath and wheezing.    Cardiovascular: Negative for chest pain, palpitations, orthopnea and leg swelling.   Gastrointestinal: Negative for abdominal pain, diarrhea, heartburn, nausea and vomiting.   Genitourinary: Positive for hematuria. Negative for dysuria, flank pain, frequency and urgency.   Musculoskeletal: Negative for back pain, myalgias and neck pain.   Skin: Negative.    Neurological:        Hx/of CVA, on wheel chair   All other systems reviewed and are negative.         Objective:     /70   Pulse 66   Temp 36.6 °C (97.8 °F)   Resp 14   Ht 1.829 m (6')   Wt 90.7 kg (200 lb)   SpO2 98%   BMI 27.12 kg/m²      Physical Exam   Constitutional: He is oriented to person, place, and time. He appears well-developed and well-nourished. No distress.   HENT:   Head: Normocephalic and atraumatic.   Nose: Nose normal.   Mouth/Throat: Oropharynx is clear and moist.   Eyes: Conjunctivae are  normal. Pupils are equal, round, and reactive to light.   Neck: Normal range of motion. Neck supple.   Cardiovascular: Normal rate and regular rhythm.  Exam reveals no gallop and no friction rub.    Pulmonary/Chest: Effort normal and breath sounds normal. No respiratory distress. He has no wheezes. He has no rales.   Abdominal: Soft. Bowel sounds are normal. He exhibits no distension. There is no tenderness.   Musculoskeletal: He exhibits no edema.   Neurological: He is alert and oriented to person, place, and time. No cranial nerve deficit. Coordination normal.   Nursing note and vitals reviewed.            Laboratory results reviewed:  Lab Results   Component Value Date/Time    CREATININE 1.71 (H) 04/05/2018 10:35 AM    CREATININE 1.4 05/28/2008 05:42 PM    POTASSIUM 3.7 04/05/2018 10:35 AM       Assessment/Plan:     1. CKD (chronic kidney disease), stage III      Creat slightly better -to monitor closely      2. Essential hypertension, benign      Well controlled    3. Microalbuminuria due to type 2 diabetes mellitus (CMS-HCC)      Microalb/creat ratio at 300 -on ARB -to monitor      4. Vitamin D deficiency      Continue supplementation      5. Nephrolithiasis      s/p lithotripsy    6.UTI - recurrent     ID consult       Recs;  ID consult              Notify Cardiology about hematuria after started Xarelto             To drink plenty of fluids              Avoid NSAID's              Monitor BP              F/u in 1 month

## 2018-04-12 NOTE — WOUND TEAM
Advanced Wound Care  [facility]  [address]  [address]  [place] [address]  [phone] Fax: [phone]      Encounter Note  For Certification Period:01/24/2018 - 04/16/2018      Referring Physician: Trinidad Maguire  Primary Physician:     Dr. Mitchell Pantoja MD   Consulting Physicians:         Wound(s):S91.009s R lateral malleolus, L lateral foot - dry eschar/scab areas. Scab on L dorsal 2nd toe  Start of Care: 01/24/2018       Subjective:        HPI: Pt is a 72 year old diabetic gentleman with a hx of cardiovascular disease, past CVA with L hemiparesis who is referred by PCP for eval of necrotic areas on R lateral malleolus and L lateral foot. He also has scabbed areas on L dorsal 2nd toe and several small dry scabs on LLE. Pt is wc bound, transfers with assist.  He has PN and LOPS dez feet.              Pain: pt denies pain            Current Medications: no med changes per pt    Allergies: Diphenhydramine hcl; Lorazepam; Ciprofloxacin; and Nkda [no known drug allergy]      Objective:      Tests and Measures: Last A1c 01/15/2018 - 7.6 per EPIC. FBS today 177, yesterday 138 per spouse. Pt states it is usually <150.   Monofilament test reveals R - 1/10 sites sensate, L - 0/10 sites sensate - LOPS dez.     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 Arterial wound healing studies ordered BLE. PAR asked to schedule pt with Dr. Terry for vascular consult.    02/08/2018 - vascular study results from [facility] -   Findings   Bilateral.    Doppler waveform of the common femoral artery is of high amplitude and    triphasic.    Doppler waveforms at the ankle are brisk and biphasic.    Absent flow within  the Left VIVIENNE.     Arterial duplex scan was performed in accordance with lower extremity    arterial evaluation protocol - see separate report.    Lower Extremity   Arterial Duplex Report     Vascular Laboratory   CONCLUSIONS   Right Lower Extremity-   Normal flow from the common femoral artery extending distally to the    popliteal artery. Flow within the posterior tibial artery is brisk and    multiphasic.   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     Left Lower Extremity-   Normal flow seen from the common femoral artery extending distally to the    popliteal artery.   Area of elevated velocities seen within the mid segment of the posterior    tibial artery. Consistent with >50% stenosis.   Occlusion of the anterior tibial artery at the mid-distal segment, minimal    flow reversal seen at the level of the ankle.     ELIGIO MADRIGAL     Exam Date:     02/07/2018 14:07        Orthotic, protective, supportive devices: pt is wc bound secondary to L hemiparisis from CVA    Fall Risk Assessment (naomy all that apply with an X):             X   65 years or older                     Fall within the last 2 years, uses   X   Ambulatory devices   X   Loss of protective sensation in feet,          Use of prostethic/orthotic, years                     Presence of lower extremity/foot/toe amputation                   Taking medication that increases risk (per facility policy)  Verbal and written fall prevention info given. Pt is wc bound and transfers with assist.     Wound Characteristics                                                    Location:R lateral malleolus   Initial Evaluation  Date:01/24/2018 Encounter Date: 04/12/2018   Tissue Type and %: 100% dry black scab/eschar 100% pink viable tissue   Periwound: intact Mild erythema   Drainage: None Scant yellow/honey   Exposed structures None none   Wound Edges:   Closed with scab/eschar Open    Odor: None None   S&S of Infection:   None None   Edema: None None   Sensation: PN - LOPS LOPS               Measurements: Initial Evaluation  Date:01/24/2018 Encounter Note 04/12/2018   Length (cm) 0.9 0.2   Width (cm) 0.9 0.2   Depth (cm) na 0.2   Area (cm2) 0.81cm2 0.04cm2   Tract/undermine na BAN              Wound Characteristics                                                    Location:   L dorsal 2nd toe Initial Evaluation  Date:  03/05/2018 Encounter Note 04/12/2018   Tissue Type and %: 100% dry yellow/eschar 10% adherent tan, 90% pink    Periwound: Mild erythema Intact   Drainage: None Scant yellow/honey   Exposed structures BAN None   Wound Edges:   Closed Open   Odor: None None   S&S of Infection:   None None   Edema: Local None   Sensation: LOPS LOPS               Measurements: L dorsal 2nd toe   Initial Evaluation  Date: 03/05/2018 Encounter Note 04/12/2018   Length (cm) 0.7 0.4   Width (cm) 0.6 0.4   Depth (cm) BAN 0.1   Area (cm2) 0.42 cm2 0.16 cm2   Tract/undermine BAN BAN              Procedures:     Debridement :  selective with tweezers to remove callus ring from liana wound on R lat malleolus. Area debrided ~0.5cm2   Cleansed with: NS/Gauze                                                                       Periwound protected with: skin prep   Primary dressing: Medihoney gel to both wounds   Secondary Dressing: Nonadhesive foams secured with hypafix   Other: Komprex foam horseshoe secured with hypafix on R lateral malleolus     Patient Education: POC and wound progress discussed with pt and spouse. Both pt and spouse knowledgeable on s/s of infection and when to go to ER.     03/05/2018 - Dr. Terry reviewed vascular study results with pt and wife. No further procedure or intervention at this time. R lateral malleolus wound scab removed by Dr. Terry and wound to be dressed with medihoney gel per Dr. Terry. Dr. Terry provided wife with a sample of medihoney gel to change at home  between C visits. Pt and wife is known to Dr. Terry, and wife has changed pt's dressing in past as well. Reviewed s/s of infection and when to go to ER, pt and wife verbalizes understanding.    Professional Collaboration: none today    Assessment:      Wound etiology: DFU, possible arterial disease    Wound Progress:  Wound slightly smaller per measurement and min viable tissue visible    Rationale for Treatment: Medihoney to provide moist wound environment, and facilitate autolytic debridement; Komprex horseshoes to relieve pressure from areas.    Patient tolerance/compliance: Pt tolerated care well. Compliant with POC and appointments.    Complicating factors: DM, possible PVD    Need for ongoing Advanced Wound Care services:continued skilled wound care for debridement as needed, dressing management and skilled clinical observation to prevent complications and expedite healing.        Plan:      Treatment Plan and Recommendations: keep areas clean and dry until arterial status has been determined. Pt to get arterial studies asap    Diagnosis/ICD10: S91.009s R lateral malleolus, L lateral foot - dry eschar/scab areas. Scab on L dorsal 2nd toe    Procedures/CPT: nonselective debridement 99969    Frequency: 1 x week      Treatment Goals: STG 2 Weeks  LTG 4 Weeks   Granulation Tissue: Pending vascular consultation %   Decrease Necrotic Tissue to: % %   Wound Phase:      Decrease Size by: % %   Periwound:      Decrease tracts/undermining by: % %   Decrease Pain:          At the time of each visit a thorough assessment of the patient is completed to assure the  appropriateness of our plan of care.  The dressings or modalities may need to be adapted   from the original plan to address any significant changes in the wound environment.

## 2018-04-13 ENCOUNTER — HOSPITAL ENCOUNTER (OUTPATIENT)
Dept: LAB | Facility: MEDICAL CENTER | Age: 73
End: 2018-04-13
Attending: INTERNAL MEDICINE
Payer: MEDICARE

## 2018-04-13 DIAGNOSIS — R19.5 DARK STOOLS: ICD-10-CM

## 2018-04-13 DIAGNOSIS — R31.9 HEMATURIA, UNSPECIFIED TYPE: ICD-10-CM

## 2018-04-13 LAB
ERYTHROCYTE [DISTWIDTH] IN BLOOD BY AUTOMATED COUNT: 47.2 FL (ref 35.9–50)
HCT VFR BLD AUTO: 42.6 % (ref 42–52)
HGB BLD-MCNC: 14 G/DL (ref 14–18)
MCH RBC QN AUTO: 29.4 PG (ref 27–33)
MCHC RBC AUTO-ENTMCNC: 32.9 G/DL (ref 33.7–35.3)
MCV RBC AUTO: 89.5 FL (ref 81.4–97.8)
PLATELET # BLD AUTO: 367 K/UL (ref 164–446)
PMV BLD AUTO: 10.4 FL (ref 9–12.9)
RBC # BLD AUTO: 4.76 M/UL (ref 4.7–6.1)
WBC # BLD AUTO: 9.4 K/UL (ref 4.8–10.8)

## 2018-04-13 PROCEDURE — 36415 COLL VENOUS BLD VENIPUNCTURE: CPT

## 2018-04-13 PROCEDURE — 85027 COMPLETE CBC AUTOMATED: CPT

## 2018-04-13 NOTE — TELEPHONE ENCOUNTER
----- Message from Silvia Escobar sent at 4/12/2018  3:54 PM PDT -----  Regarding: dark urine  Contact: [phone]  [facility]/ricardo    Pt's wife Usha calling to report pt's urine is extremely dark, some blood on bedsheets.    Pt is also being treating for UTI by PCP, Dr Pantoja.    Dr Jarvis, pt's nephrologist suggested Usha contact [facility].   Please call Usha at .

## 2018-04-13 NOTE — TELEPHONE ENCOUNTER
Called pt and spoke with wife. Per wife, pt started xarelto 1 week ago. He is currently being treated for a UTI w/cefdinir. She has noticed dark urine and stools and possibly some pink-tinged spots on bedsheets. Denies any additional s/s of bleeding. Discussed with Dr. Tucker in office--pt to stop xarelto and re-start Plavix, CBC and occult blood stool, and follow up w/NP in 2 weeks.    Orders placed, NP appt made for 4/26, and called pt to update on recommendations. Pt verbalized understanding and will go to [facility] lab tomorrow.

## 2018-04-16 ENCOUNTER — OFFICE VISIT (OUTPATIENT)
Dept: ENDOCRINOLOGY | Facility: MEDICAL CENTER | Age: 73
End: 2018-04-16
Payer: MEDICARE

## 2018-04-16 VITALS
HEART RATE: 61 BPM | DIASTOLIC BLOOD PRESSURE: 70 MMHG | SYSTOLIC BLOOD PRESSURE: 120 MMHG | WEIGHT: 200 LBS | OXYGEN SATURATION: 100 % | BODY MASS INDEX: 27.12 KG/M2

## 2018-04-16 DIAGNOSIS — E78.2 MIXED HYPERLIPIDEMIA: ICD-10-CM

## 2018-04-16 DIAGNOSIS — E11.42 DIABETIC PERIPHERAL NEUROPATHY (HCC): ICD-10-CM

## 2018-04-16 DIAGNOSIS — Z79.4 TYPE 2 DIABETES MELLITUS WITH HYPERGLYCEMIA, WITH LONG-TERM CURRENT USE OF INSULIN (HCC): ICD-10-CM

## 2018-04-16 DIAGNOSIS — E11.65 TYPE 2 DIABETES MELLITUS WITH HYPERGLYCEMIA, WITH LONG-TERM CURRENT USE OF INSULIN (HCC): ICD-10-CM

## 2018-04-16 DIAGNOSIS — I10 ESSENTIAL HYPERTENSION: ICD-10-CM

## 2018-04-16 PROCEDURE — 99214 OFFICE O/P EST MOD 30 MIN: CPT | Performed by: INTERNAL MEDICINE

## 2018-04-16 NOTE — PROGRESS NOTES
Endocrinology Clinic Progress Note    CC: Diabetes    HPI:  Av Bob is a 72 y.o. old patient who comes in today for routine follow up.     Type 2 diabetes: He is currently on Toujeo 15 units at bedtime and Humalog 10 units with each meal plus mid dose correction factor. He checks blood sugars 3-4 times a day. Most blood sugar readings are in the range of 180-240. No recent hypoglycemia.    Hypertension: Blood pressure is well controlled. He reports compliance with medications. He is on ACE inhibitor.    Hyperlipidemia: He is currently on fenofibrate and Lipitor. Cholesterol levels are goal.    ROS:  Constitutional: No unintentional weight loss  Endo: Denies excessive thirst or frequent urination    PMH:  Type 2 diabetes  Hypertension  Hyperlipidemia    EXAM:  Vital signs: /70   Pulse 61   Wt 90.7 kg (200 lb)   SpO2 100%   BMI 27.12 kg/m²   General: No apparent distress, cooperative  Eyes: No scleral icterus, no discharge  Neck: Normal on external inspection  Resp: Normal effort, clear to auscultation bilaterally  CVS: Regular rate and rhythm, normal S1-S2  Psych: Alert and oriented, normal mood and affect    Assessment and Plan:    1. Type 2 diabetes mellitus with hyperglycemia, with long-term current use of insulin (CMS-[facility])  · Recent hemoglobin A1c is 8%  · Goal hemoglobin A1c less than 7.5%  · Increased Toujeo to 18 units at bedtime, and continue Humalog 10 units 3 times a day with meals +2 additional units for every 50mg/dL BG above 150  · We discussed about pros and cons of GLP1 agonists, we are starting Trulicity    2. Mixed hyperlipidemia  · Continue Lipitor and fenofibrate    3. Essential hypertension  · Blood pressure is well controlled    4. Diabetic peripheral neuropathy    Return in about 3 months (around 7/16/2018).    Thank you for allowing me to participate in the care of this patient.    Trinidad Maguire M.D.    This note was created using voice recognition software  (Melody). The accuracy of the dictation is limited by the abilities of the software. I have reviewed the note prior to signing, however some errors in grammar and context are still possible. If you have any questions related to this note please do not hesitate to contact our office.

## 2018-04-17 ENCOUNTER — HOSPITAL ENCOUNTER (OUTPATIENT)
Facility: MEDICAL CENTER | Age: 73
End: 2018-04-17
Attending: INTERNAL MEDICINE
Payer: MEDICARE

## 2018-04-17 ENCOUNTER — TELEPHONE (OUTPATIENT)
Dept: CARDIOLOGY | Facility: MEDICAL CENTER | Age: 73
End: 2018-04-17

## 2018-04-17 LAB — HEMOCCULT STL QL: NEGATIVE

## 2018-04-17 PROCEDURE — 82272 OCCULT BLD FECES 1-3 TESTS: CPT

## 2018-04-17 NOTE — TELEPHONE ENCOUNTER
Laura Little R.N.             [facility]/Scarlett     Patient's wife, Usha, has questions about her 's lab order and she wants a call back at [phone].     ========================================================    Called pt's wife back, wife reports that they just received the order for CBC and stool occult order from the mail but wife reports they've already completed both test (they went to [facility]), instructed wife that they could just disregard the order received from the mail, wife verbalizes understanding

## 2018-04-19 ENCOUNTER — NON-PROVIDER VISIT (OUTPATIENT)
Dept: WOUND CARE | Facility: MEDICAL CENTER | Age: 73
End: 2018-04-19
Attending: INTERNAL MEDICINE
Payer: MEDICARE

## 2018-04-19 PROCEDURE — 97597 DBRDMT OPN WND 1ST 20 CM/<: CPT

## 2018-04-19 NOTE — CERTIFICATION
[facility]  [facility]  [address]  [address]  [place] [address]  [phone] Fax: [phone]      80 Day Certification   For Certification Period: 04/16/2018 - 07/06/2018      Referring Physician: Trinidad Maguire  Primary Physician:     Dr. Mitchell Pantoja MD   Consulting Physicians:         Wound(s):S91.009s R lateral malleolus, L lateral foot - dry eschar/scab areas. Scab on L dorsal 2nd toe  Start of Care: 01/24/2018       Subjective:        HPI: Pt is a 72 year old diabetic gentleman with a hx of cardiovascular disease, past CVA with L hemiparesis who is referred by PCP for eval of necrotic areas on R lateral malleolus and L lateral foot. He also has scabbed areas on L dorsal 2nd toe and several small dry scabs on LLE. Pt is wc bound, transfers with assist.  He has PN and LOPS dez feet.              Pain: pt denies pain            Current Medications:   Current Outpatient Prescriptions:   •  clopidogrel (PLAVIX) 75 MG Tab, TAKE 1 TABLET BY MOUTH EVERY DAY, Disp: 90 Tab, Rfl: 1  •  Dulaglutide (TRULICITY) 0.75 MG/0.5ML Solution Pen-injector, Inject 0.75 mg as instructed every 7 days., Disp: 4 PEN, Rfl: 6  •  rivaroxaban (XARELTO) 15 MG Tab tablet, Take 1 Tab by mouth with dinner., Disp: 90 Tab, Rfl: 3  •  metoprolol (TOPROL-XL) 200 MG XL tablet, Take 1 Tab by mouth every day., Disp: 90 Tab, Rfl: 3  •  Blood Glucose Monitoring Suppl Supplies Misc, One Touch ultra glucometer strips for blood sugar check up to 5 times a day, Disp: 450 Each, Rfl: 2  •  atorvastatin (LIPITOR) 20 MG Tab, Take 1 Tab by mouth every day., Disp: 90 Tab, Rfl: 2  •  allopurinol (ZYLOPRIM) 300 MG Tab, Take 150 mg by mouth every day., Disp: , Rfl:   •  ascorbic acid (VITAMIN C) 500 MG tablet, Take 500 mg by mouth every day., Disp: , Rfl:   •  Cholecalciferol (VITAMIN D) 2000 units Cap, Take 1 Cap by mouth every evening., Disp: , Rfl:   •  insulin lispro (HUMALOG) 100 UNIT/ML Solution, Inject 10-14 Units as  instructed 3 times a day before meals. 10 units every meal up to , then add 2 units for Every 50 above 150 BG, Disp: , Rfl:   •  Acetaminophen (TYLENOL PO), Take 1,250 mg by mouth 2 times a day as needed. 625 mg each tab, Disp: , Rfl:   •  NON SPECIFIED, Apply 1 Application to affected area(s) 3 times a day as needed. Therawax, Disp: , Rfl:   •  fluoxetine (PROZAC) 40 MG capsule, Take 1 Cap by mouth every day., Disp: 90 Cap, Rfl: 1  •  Insulin Glargine (TOUJEO SOLOSTAR) 300 UNIT/ML Solution Pen-injector, Inject 15 Units as instructed every evening., Disp: 3 PEN, Rfl: 6  •  magnesium oxide (MAG-OX) 400 MG Tab, Take 400 mg by mouth 3 times a day., Disp: , Rfl:   •  fenofibrate (TRICOR) 145 MG Tab, Take 1 Tab by mouth every day., Disp: 90 Tab, Rfl: 3  •  losartan (COZAAR) 50 MG Tab, Take 1 Tab by mouth every day., Disp: 30 Tab, Rfl: 6  •  spironolactone (ALDACTONE) 50 MG Tab, Take 1 Tab by mouth every day., Disp: 90 Tab, Rfl: 3  •  aspirin 81 MG tablet, Take 81 mg by mouth every day., Disp: , Rfl:   •  CENTRUM SILVER PO, Take 1 Tab by mouth every day., Disp: , Rfl:       Allergies: Diphenhydramine hcl; Lorazepam; Ciprofloxacin; and Nkda [no known drug allergy]      Objective:      Tests and Measures: Last A1c 01/15/2018 - 7.6 per EPIC. FBS today 177, yesterday 138 per spouse. Pt states it is usually <150.   Monofilament test reveals R - 1/10 sites sensate, L - 0/10 sites sensate - LOPS dez.     Vascular - PADMINI performed by myself and fady Shea -              Right Left   dp - 184 dp - 112   Pt - 180  dp 110   Brachial - 160 (not done - L hemiparisis from CVA)   PADMINI PADMINI     dp - 1.15  Pt - 1.12 dp - 0.7  Pt - 0.68      Doppler exam reveals monophasic waveforms to L dp/pt/ant tib, and biphasic R dp, ant tib possibly monophasic to R pt. Arterial wound healing studies ordered BLE. PAR asked to schedule pt with Dr. Terry for vascular consult.    02/08/2018 - vascular study results from [facility] -    Findings   Bilateral.    Doppler waveform of the common femoral artery is of high amplitude and    triphasic.    Doppler waveforms at the ankle are brisk and biphasic.    Absent flow within the Left VIVIENNE.     Arterial duplex scan was performed in accordance with lower extremity    arterial evaluation protocol - see separate report.    Lower Extremity   Arterial Duplex Report     Vascular Laboratory   CONCLUSIONS   Right Lower Extremity-   Normal flow from the common femoral artery extending distally to the    popliteal artery. Flow within the posterior tibial artery is brisk and    multiphasic.   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     Left Lower Extremity-   Normal flow seen from the common femoral artery extending distally to the    popliteal artery.   Area of elevated velocities seen within the mid segment of the posterior    tibial artery. Consistent with >50% stenosis.   Occlusion of the anterior tibial artery at the mid-distal segment, minimal    flow reversal seen at the level of the ankle.     ELIGIO MADRIGAL     Exam Date:     02/07/2018 14:07        Orthotic, protective, supportive devices: pt is wc bound secondary to L hemiparisis from CVA    Fall Risk Assessment (naomy all that apply with an X):             X   65 years or older                     Fall within the last 2 years, uses   X   Ambulatory devices   X   Loss of protective sensation in feet,          Use of prostethic/orthotic, years                     Presence of lower extremity/foot/toe amputation                   Taking medication that increases risk (per facility policy)  Verbal and written fall prevention info given. Pt is wc bound and transfers with assist.     Wound Characteristics                                                    Location:R lateral malleolus   Initial Evaluation  Date:01/24/2018 Encounter Date: 04/12/2018 80 Day Certification Encounter  Date: 04/19/2018   Tissue Type and %: 100% dry black scab/eschar 100% pink viable tissue 100% moist pink   Periwound: intact Mild erythema intact   Drainage: None Scant yellow/honey Scant sanguinous    Exposed structures None none none   Wound Edges:   Closed with scab/eschar Open open   Odor: None None none   S&S of Infection:   None None none   Edema: None None none   Sensation: PN - LOPS LOPS LOPS               Measurements: Initial Evaluation  Date:01/24/2018 Encounter Note 04/12/2018 80 Day Certification Encounter Date: 04/19/2018   Length (cm) 0.9 0.2 0.3   Width (cm) 0.9 0.2 0.3   Depth (cm) na 0.2 0.1   Area (cm2) 0.81cm2 0.04cm2 0.09cm2   Tract/undermine na BAN none                  Wound Characteristics                                                    Location:   L dorsal 2nd toe Initial Evaluation  Date:  03/05/2018 Encounter Note 04/12/2018 80 Day Certification Encounter Date: 04/19/2018   Tissue Type and %: 100% dry yellow/eschar 10% adherent tan, 90% pink  100% moist pink   Periwound: Mild erythema Intact Mild erythema   Drainage: None Scant yellow/honey Scant serosanguinous   Exposed structures BAN None none   Wound Edges:   Closed Open open   Odor: None None none   S&S of Infection:   None None Mild erythema, edema   Edema: Local None Localized   Sensation: LOPS LOPS LOPS               Measurements: L dorsal 2nd toe   Initial Evaluation  Date: 03/05/2018 Encounter Note 04/12/2018 80 Day Certification Encounter Date: 04/19/2018   Length (cm) 0.7 0.4 0.5   Width (cm) 0.6 0.4 0.7   Depth (cm) BAN 0.1 0.2   Area (cm2) 0.42 cm2 0.16 cm2 0.35cm2   Tract/undermine BAN BAN None                Procedures:     Debridement :  CSWD with scalpel and forceps to remove callus ring and adherent slough from wound bed ~0.5cm2   Cleansed with: NS/Gauze                                                                       Periwound protected with: skin prep   Primary dressing: Medihoney gel to both wounds   Secondary  Dressing: Nonadhesive foams secured with hypafix   Other: Komprex foam horseshoe secured with hypafix on R lateral malleolus     Patient Education: POC and wound progress discussed with pt and spouse. Both pt and spouse knowledgeable on s/s of infection and when to go to ER.     03/05/2018 - Dr. Terry reviewed vascular study results with pt and wife. No further procedure or intervention at this time. R lateral malleolus wound scab removed by Dr. Terry and wound to be dressed with medihoney gel per Dr. Terry. Dr. Terry provided wife with a sample of medihoney gel to change at home between [facility] visits. Pt and wife is known to Dr. Terry, and wife has changed pt's dressing in past as well. Reviewed s/s of infection and when to go to ER, pt and wife verbalizes understanding.    Professional Collaboration: 80 Day Re-certification sent to Trinidad Maguire MD via [facility]    Assessment:      Wound etiology: DFU, possible arterial disease    Wound Progress:  Wound beds have viable tissue visible and malleolus     Rationale for Treatment: Medihoney to provide moist wound environment, and facilitate autolytic debridement; Komprex horseshoes to relieve pressure from areas.    Patient tolerance/compliance: Pt tolerated care well. Compliant with POC and appointments.    Complicating factors: DM, possible PVD    Need for ongoing Advanced Wound Care services:continued skilled wound care for debridement as needed, dressing management and skilled clinical observation to prevent complications and expedite healing.        Plan:      Treatment Plan and Recommendations: keep areas clean and dry until arterial status has been determined. Pt to get arterial studies asap    Diagnosis/ICD10: S91.009s R lateral malleolus, L lateral foot - dry eschar/scab areas. Scab on L dorsal 2nd toe    Procedures/CPT: nonselective debridement 11977    Frequency: 1 x week      Treatment Goals: STG 2 Weeks  LTG 4 Weeks   Granulation Tissue: Pending  vascular consultation %   Decrease Necrotic Tissue to: % %   Wound Phase:      Decrease Size by: % %   Periwound:      Decrease tracts/undermining by: % %   Decrease Pain:          At the time of each visit a thorough assessment of the patient is completed to assure the  appropriateness of our plan of care.  The dressings or modalities may need to be adapted   from the original plan to address any significant changes in the wound environment.

## 2018-04-20 ENCOUNTER — OFFICE VISIT (OUTPATIENT)
Dept: BEHAVIORAL HEALTH | Facility: PHYSICIAN GROUP | Age: 73
End: 2018-04-20
Payer: MEDICARE

## 2018-04-20 DIAGNOSIS — F43.21 ADJUSTMENT DISORDER WITH DEPRESSED MOOD: ICD-10-CM

## 2018-04-20 PROCEDURE — 90834 PSYTX W PT 45 MINUTES: CPT | Performed by: PSYCHOLOGIST

## 2018-04-20 NOTE — BH THERAPY
[facility]  Therapy Progress Note    Patient Name: Av Bob  Patient MRN: [id number]  Today's Date: 4/20/2018    Type of session:Individual psychotherapy  Length of session: 45 minutes  Persons in attendance:Patient    Subjective/New Info: Client arrived on time for individual therapy appointment. He reports that he has been doing pretty well since previous visit. Client discussed continuing to feel frustrated with the slow progression of his recovery. He reports that his physical therapy sessions have been cut and he is currently doing his exercises at home. He states that it is difficult to maintain motivation but his wife helps out in this area. Client discussed canceling his trip to [place] this summer due to not being able to walk. Writer and client discussed the things in client's life that bring him happiness including his grandchildren. Client reports that he would like to be able to take them to [facility] at the end of the summer. Writer and client discussed writer leaving position next month and client states that he would like to terminate therapy at that time. Writer and client discussed termination of therapy.    Objective/Observations:   Participation: Active verbal participation   Grooming: Casual   Cognition: Alert   Eye contact: Limited   Mood: Euthymic   Affect: Constricted   Thought process: Logical and Goal-directed   Speech: Rate within normal limits and Volume within normal limits   Other:     Diagnoses:   1. Adjustment disorder with depressed mood         Current risk:   SUICIDE: Low   Homicide: Low   Self-harm: Low   Relapse: Low   Other:    Safety Plan reviewed? Not Indicated   If evidence of imminent risk is present, intervention/plan:     Therapeutic Intervention(s): Leisure and recreation skills, Review treatment plan, Supportive psychotherapy and Systematic desensitization    Treatment Goal(s)/Objective(s) addressed: Decrease symptoms of depression     Progress  toward Treatment Goals: Mild improvement      Plan:  - Termination of therapy    Mariella Levi, Ph.D.  4/20/2018

## 2018-04-26 ENCOUNTER — OFFICE VISIT (OUTPATIENT)
Dept: CARDIOLOGY | Facility: MEDICAL CENTER | Age: 73
End: 2018-04-26
Payer: MEDICARE

## 2018-04-26 ENCOUNTER — NON-PROVIDER VISIT (OUTPATIENT)
Dept: WOUND CARE | Facility: MEDICAL CENTER | Age: 73
End: 2018-04-26
Attending: INTERNAL MEDICINE
Payer: MEDICARE

## 2018-04-26 ENCOUNTER — TELEPHONE (OUTPATIENT)
Dept: ENDOCRINOLOGY | Facility: MEDICAL CENTER | Age: 73
End: 2018-04-26

## 2018-04-26 VITALS
HEART RATE: 76 BPM | WEIGHT: 200 LBS | HEIGHT: 72 IN | OXYGEN SATURATION: 96 % | DIASTOLIC BLOOD PRESSURE: 80 MMHG | SYSTOLIC BLOOD PRESSURE: 120 MMHG | BODY MASS INDEX: 27.09 KG/M2

## 2018-04-26 DIAGNOSIS — I63.40 CEREBROVASCULAR ACCIDENT (CVA) DUE TO EMBOLISM OF CEREBRAL ARTERY (HCC): ICD-10-CM

## 2018-04-26 DIAGNOSIS — I48.0 PAROXYSMAL ATRIAL FIBRILLATION (HCC): Primary | ICD-10-CM

## 2018-04-26 DIAGNOSIS — N39.0 RECURRENT UTI: ICD-10-CM

## 2018-04-26 DIAGNOSIS — R31.0 GROSS HEMATURIA: ICD-10-CM

## 2018-04-26 LAB — EKG IMPRESSION: NORMAL

## 2018-04-26 PROCEDURE — 93000 ELECTROCARDIOGRAM COMPLETE: CPT | Performed by: NURSE PRACTITIONER

## 2018-04-26 PROCEDURE — 99214 OFFICE O/P EST MOD 30 MIN: CPT | Performed by: NURSE PRACTITIONER

## 2018-04-26 PROCEDURE — 97602 WOUND(S) CARE NON-SELECTIVE: CPT

## 2018-04-26 ASSESSMENT — ENCOUNTER SYMPTOMS
ABDOMINAL PAIN: 0
ORTHOPNEA: 0
FOCAL WEAKNESS: 1
SHORTNESS OF BREATH: 0
PALPITATIONS: 0
WEAKNESS: 0
MYALGIAS: 0
PND: 0
DIZZINESS: 0
CLAUDICATION: 0
COUGH: 0

## 2018-04-26 NOTE — WOUND TEAM
[facility]  [facility]  [address]  [address]  [place] [address]  [phone] Fax: [phone]      80 Day Certification   For Certification Period: 04/16/2018 - 07/06/2018      Referring Physician: Trinidad Maguire  Primary Physician:     Dr. Mitchell Pantoja MD   Consulting Physicians:         Wound(s):S91.009s R lateral malleolus, L lateral foot - dry eschar/scab areas. Scab on L dorsal 2nd toe  Start of Care: 01/24/2018       Subjective:        HPI: Pt is a 72 year old diabetic gentleman with a hx of cardiovascular disease, past CVA with L hemiparesis who is referred by PCP for eval of necrotic areas on R lateral malleolus and L lateral foot. He also has scabbed areas on L dorsal 2nd toe and several small dry scabs on LLE. Pt is wc bound, transfers with assist.  He has PN and LOPS dez feet.              Pain: pt denies pain            Current Medications:   Current Outpatient Prescriptions:   •  clopidogrel (PLAVIX) 75 MG Tab, TAKE 1 TABLET BY MOUTH EVERY DAY, Disp: 90 Tab, Rfl: 1  •  Dulaglutide (TRULICITY) 0.75 MG/0.5ML Solution Pen-injector, Inject 0.75 mg as instructed every 7 days., Disp: 4 PEN, Rfl: 6  •  rivaroxaban (XARELTO) 15 MG Tab tablet, Take 1 Tab by mouth with dinner., Disp: 90 Tab, Rfl: 3  •  metoprolol (TOPROL-XL) 200 MG XL tablet, Take 1 Tab by mouth every day., Disp: 90 Tab, Rfl: 3  •  Blood Glucose Monitoring Suppl Supplies Misc, One Touch ultra glucometer strips for blood sugar check up to 5 times a day, Disp: 450 Each, Rfl: 2  •  atorvastatin (LIPITOR) 20 MG Tab, Take 1 Tab by mouth every day., Disp: 90 Tab, Rfl: 2  •  allopurinol (ZYLOPRIM) 300 MG Tab, Take 150 mg by mouth every day., Disp: , Rfl:   •  ascorbic acid (VITAMIN C) 500 MG tablet, Take 500 mg by mouth every day., Disp: , Rfl:   •  Cholecalciferol (VITAMIN D) 2000 units Cap, Take 1 Cap by mouth every evening., Disp: , Rfl:   •  insulin lispro (HUMALOG) 100 UNIT/ML Solution, Inject 10-14 Units as  instructed 3 times a day before meals. 10 units every meal up to , then add 2 units for Every 50 above 150 BG, Disp: , Rfl:   •  Acetaminophen (TYLENOL PO), Take 1,250 mg by mouth 2 times a day as needed. 625 mg each tab, Disp: , Rfl:   •  NON SPECIFIED, Apply 1 Application to affected area(s) 3 times a day as needed. Therawax, Disp: , Rfl:   •  fluoxetine (PROZAC) 40 MG capsule, Take 1 Cap by mouth every day., Disp: 90 Cap, Rfl: 1  •  Insulin Glargine (TOUJEO SOLOSTAR) 300 UNIT/ML Solution Pen-injector, Inject 15 Units as instructed every evening., Disp: 3 PEN, Rfl: 6  •  magnesium oxide (MAG-OX) 400 MG Tab, Take 400 mg by mouth 3 times a day., Disp: , Rfl:   •  fenofibrate (TRICOR) 145 MG Tab, Take 1 Tab by mouth every day., Disp: 90 Tab, Rfl: 3  •  losartan (COZAAR) 50 MG Tab, Take 1 Tab by mouth every day., Disp: 30 Tab, Rfl: 6  •  spironolactone (ALDACTONE) 50 MG Tab, Take 1 Tab by mouth every day., Disp: 90 Tab, Rfl: 3  •  aspirin 81 MG tablet, Take 81 mg by mouth every day., Disp: , Rfl:   •  CENTRUM SILVER PO, Take 1 Tab by mouth every day., Disp: , Rfl:       Allergies: Diphenhydramine hcl; Lorazepam; Ciprofloxacin; and Nkda [no known drug allergy]      Objective:      Tests and Measures: Last A1c 01/15/2018 - 7.6 per EPIC. FBS today 177, yesterday 138 per spouse. Pt states it is usually <150.   Monofilament test reveals R - 1/10 sites sensate, L - 0/10 sites sensate - LOPS dez.     Vascular - PADMINI performed by myself and fady Shea -              Right Left   dp - 184 dp - 112   Pt - 180  dp 110   Brachial - 160 (not done - L hemiparisis from CVA)   PADMINI PADMINI     dp - 1.15  Pt - 1.12 dp - 0.7  Pt - 0.68      Doppler exam reveals monophasic waveforms to L dp/pt/ant tib, and biphasic R dp, ant tib possibly monophasic to R pt. Arterial wound healing studies ordered BLE. PAR asked to schedule pt with Dr. Terry for vascular consult.    02/08/2018 - vascular study results from [facility] -    Findings   Bilateral.    Doppler waveform of the common femoral artery is of high amplitude and    triphasic.    Doppler waveforms at the ankle are brisk and biphasic.    Absent flow within the Left VIVIENNE.     Arterial duplex scan was performed in accordance with lower extremity    arterial evaluation protocol - see separate report.    Lower Extremity   Arterial Duplex Report     Vascular Laboratory   CONCLUSIONS   Right Lower Extremity-   Normal flow from the common femoral artery extending distally to the    popliteal artery. Flow within the posterior tibial artery is brisk and    multiphasic.   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     Left Lower Extremity-   Normal flow seen from the common femoral artery extending distally to the    popliteal artery.   Area of elevated velocities seen within the mid segment of the posterior    tibial artery. Consistent with >50% stenosis.   Occlusion of the anterior tibial artery at the mid-distal segment, minimal    flow reversal seen at the level of the ankle.     ELIGIO MADRIGAL     Exam Date:     02/07/2018 14:07        Orthotic, protective, supportive devices: pt is wc bound secondary to L hemiparisis from CVA    Fall Risk Assessment (naomy all that apply with an X):             X   65 years or older                     Fall within the last 2 years, uses   X   Ambulatory devices   X   Loss of protective sensation in feet,          Use of prostethic/orthotic, years                     Presence of lower extremity/foot/toe amputation                   Taking medication that increases risk (per facility policy)  Verbal and written fall prevention info given. Pt is wc bound and transfers with assist.     Wound Characteristics                                                    Location:R lateral malleolus   Initial Evaluation  Date:01/24/2018 80 Day Certification Encounter Date: 04/19/2018 Encounter  Date: 04/26/2018   Tissue Type and %: 100% dry black scab/eschar 100% moist pink 100% moist pink   Periwound: intact intact Mild maceration   Drainage: None Scant sanguinous  Scant serous   Exposed structures None none None   Wound Edges:   Closed with scab/eschar open Open   Odor: None none None   S&S of Infection:   None none None   Edema: None none None   Sensation: PN - LOPS LOPS LOPS               Measurements: Initial Evaluation  Date:01/24/2018 80 Day Certification Encounter Date: 04/19/2018 Encounter Date: 04/26/2018   Length (cm) 0.9 0.3 0.3   Width (cm) 0.9 0.3 0.3   Depth (cm) na 0.1 0.1   Area (cm2) 0.81cm2 0.09cm2 0.09cm 2   Tract/undermine na none None                Wound Characteristics                                                    Location:   L dorsal 2nd toe Initial Evaluation  Date:  03/05/2018 80 Day Certification Encounter Date: 04/19/2018 Encounter Date: 04/26/2018   Tissue Type and %: 100% dry yellow/eschar 100% moist pink 100% moist pink   Periwound: Mild erythema Mild erythema Mild erythema   Drainage: None Scant serosanguinous Scant serous   Exposed structures BAN none None   Wound Edges:   Closed open Open   Odor: None none None   S&S of Infection:   None Mild erythema, edema Mild erythema   Edema: Local Localized None   Sensation: LOPS LOPS LOPS               Measurements: L dorsal 2nd toe   Initial Evaluation  Date: 03/05/2018 80 Day Certification Encounter Date: 04/19/2018 Encounter Date: 04/26/2018   Length (cm) 0.7 0.5 0.3   Width (cm) 0.6 0.7 0.3   Depth (cm) BAN 0.2 0.1   Area (cm2) 0.42 cm2 0.35cm2 0.09cm2   Tract/undermine BAN None None          Procedures:     Debridement :  Nonselective with NS and gauze   Cleansed with: NS/Gauze                                                                       Periwound protected with: skin prep   Primary dressing: Medihoney gel to both wounds   Secondary Dressing: Nonadhesive foams secured with hypafix   Other: Komprex foam horseshoe  secured with hypafix on R lateral malleolus     Patient Education: POC and wound progress discussed with pt and spouse. Spouse changes dressing at home. She put on bandaid on malleolus wound, and wound is mildly macerated today. Advised to use non ad foam then apply band-aid. Pt and spouse verbalizes understanding. Both pt and spouse knowledgeable on s/s of infection and when to go to ER.     03/05/2018 - Dr. Terry reviewed vascular study results with pt and wife. No further procedure or intervention at this time. R lateral malleolus wound scab removed by Dr. Terry and wound to be dressed with medihoney gel per Dr. Terry. Dr. Terry provided wife with a sample of medihoney gel to change at home between [facility] visits. Pt and wife is known to Dr. Terry, and wife has changed pt's dressing in past as well. Reviewed s/s of infection and when to go to ER, pt and wife verbalizes understanding.    Professional Collaboration: None today    Assessment:      Wound etiology: DFU, possible arterial disease    Wound Progress:  Wound beds have viable tissue visible, L toe wound smaller per measurement.    Rationale for Treatment: Medihoney to provide moist wound environment, and facilitate autolytic debridement; Komprex horseshoes to relieve pressure from areas.    Patient tolerance/compliance: Pt tolerated care well. Compliant with POC and appointments.    Complicating factors: DM, possible PVD    Need for ongoing Advanced Wound Care services:continued skilled wound care for debridement as needed, dressing management and skilled clinical observation to prevent complications and expedite healing.        Plan:      Treatment Plan and Recommendations: keep areas clean and dry until arterial status has been determined. Pt to get arterial studies asap    Diagnosis/ICD10: S91.009s R lateral malleolus, L lateral foot - dry eschar/scab areas. Scab on L dorsal 2nd toe    Procedures/CPT: nonselective debridement  83856    Frequency: 1 x week      Treatment Goals: STG 2 Weeks  LTG 4 Weeks   Granulation Tissue: Pending vascular consultation %   Decrease Necrotic Tissue to: % %   Wound Phase:      Decrease Size by: % %   Periwound:      Decrease tracts/undermining by: % %   Decrease Pain:          At the time of each visit a thorough assessment of the patient is completed to assure the  appropriateness of our plan of care.  The dressings or modalities may need to be adapted   from the original plan to address any significant changes in the wound environment.

## 2018-04-26 NOTE — LETTER
[facility]   [address] [place] [address]  SUGEY Krause [address]  Phone: [phone]  Fax: [phone]              Av Bob  1945    Encounter Date: 4/26/2018    ZAKIYA Knowles          PROGRESS NOTE:  Chief Complaint   Patient presents with   • Blood in Urine     Previous patient       Subjective:   Av Bob is a 73 y.o. male who presents today with his wife, Usha, to follow-up on hematuria.    The patient was seen by Dr. Flakito Tucker on April 3, 2018. Due to the patient's history of paroxysmal atrial fibrillation occurred when he was septic and the patient's previous stroke, Dr. Mullinstern patient on a Xarelto 50 mg for further stroke prevention.    After the first dose of the Xarelto the patient developed hematuria and dark stools. He took a total of 6 doses of the Xarelto before it was discontinued. He contacted the office and a urinalysis and Hemoccult were ordered. A Hemoccult was done after the patient had his stools returned to normal therefore it was negative.    Patient has a history of recurrent urinary tract infections and has been seen by urology, nephrology and now is referred to infectious disease.    Patient has a complex medical history including coronary artery disease status post stenting. History of CVA, chronic kidney disease, diabetes and gout.    Patient states he feels generally well and has had no further hematuria or dark stools. He is wheelchair bound with left hemiparesis. He denies any palpitations or rapid heart beats. No shortness of breath.    Past Medical History:   Diagnosis Date   • Acute respiratory failure with hypoxia (CMS-HCC) 5/20/2017   • CAD (coronary artery disease)     GIOVANNA to RCA in '97, GIOVANNA X2 to LAD and GIOVANNA X2 to OM in '09   • Cancer (CMS-HCC)     2017; chemo lympoma   • Cataract    • Cerebrovascular accident (CVA) (CMS-HCC) 12/30/2016    Left arm weakness  etiology of stroke not  established, lymphoma discovered on MRI evaluation of stroke, L hemiparesis much worse after acute infectious illness in mid 2017, but no specific diagnosed recurrent neurological etiology, all at [facility]   • CKD (chronic kidney disease) stage 3, GFR 30-59 ml/min    • Controlled gout 2014   • Coronary atherosclerosis of native coronary artery     S/P PTCA (percutaneous transluminal coronary angioplasty), RCA, 5/1997, patent on cath 7/10/2009 at the time of interventions on his left anterior descending and circumflex coronary arteries   • Depression    • Diabetes (CMS-HCC)    • Enterococcal septicemia (CMS-HCC) 8/12/2017   • Hypertension    • Hypokalemia 2012    controlled with combination of ACE inhibitor or ARB plus spironolactone   • Hypomagnesemia 08/12/2017    etiology uncertain   • 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 Barry   • NSTEMI (non-ST elevated myocardial infarction) (CMS-HCC) 07/18/2017    complicating UTI with sepsis   • Pain    • Polyneuropathy in diabetes(357.2) 9/11/2013   • Septic shock (CMS-HCC) 5/20/2017   • Skin ulcer of calf (CMS-HCC) 2015    Right, Dr. Terry and wound care   • Stroke (CMS-HCC) 2016    left sided weakness   • Urinary bladder disorder    • Urinary incontinence    • Wound of left leg 2012    Requiring surgery and debridment, Dr. Moore     Past Surgical History:   Procedure Laterality Date   • CYSTOSCOPY STENT PLACEMENT Left 2/12/2018    Procedure: CYSTOSCOPY  ;  Surgeon: Rey Barry M.D.;  Location: SURGERY [facility];  Service: Urology   • URETEROSCOPY Left 2/12/2018    Procedure: URETEROSCOPY- FLEXIBLE  ;  Surgeon: Rey Barry M.D.;  Location: SURGERY [facility];  Service: Urology   • LASERTRIPSY Left 2/12/2018    Procedure: LASERTRIPSY - LITHO  ;  Surgeon: Rey Barry M.D.;  Location: SURGERY [facility];  Service:  Urology   • WOUND CLOSURE GENERAL  4/3/2012    Performed by GERHARD GILBERT at SURGERY SAME DAY [facility] ORS   • CARDIAC CATH  2009    Stents to LAD, Om   • DAGOBERTO BY LAPAROSCOPY  1998   • ANGIOPLASTY  1997    RCA followed by other stents as noted above.    • CARDIAC CATH  1997    stent RCA   • CATARACT EXTRACTION WITH IOL      bilateral   • LITHOTRIPSY     • TONSILLECTOMY AND ADENOIDECTOMY       Family History   Problem Relation Age of Onset   • Heart Disease Father      CAD   • Diabetes Father    • Cancer Mother    • Psychiatry Mother      Depression   • Depression Mother    • Kidney stones Brother    • Heart Disease Brother    • Psychiatry Brother      Depression   • Depression Brother    • Suicide Attempts Other    • Psychiatry Other      autism     Social History     Social History   • Marital status:      Spouse name: N/A   • Number of children: N/A   • Years of education: N/A     Occupational History   • Not on file.     Social History Main Topics   • Smoking status: Never Smoker   • Smokeless tobacco: Never Used   • Alcohol use No   • Drug use: No   • Sexual activity: Not Currently     Partners: Female     Other Topics Concern   • Not on file     Social History Narrative   • No narrative on file     Allergies   Allergen Reactions   • Diphenhydramine Hcl Anxiety     Pt is able to tolerate  Mg benadryl with less anxiety   • Lorazepam Unspecified     Disorientation   • Ciprofloxacin      Rash,stomach ache     Outpatient Encounter Prescriptions as of 4/26/2018   Medication Sig Dispense Refill   • clopidogrel (PLAVIX) 75 MG Tab TAKE 1 TABLET BY MOUTH EVERY DAY 90 Tab 1   • Dulaglutide (TRULICITY) 0.75 MG/0.5ML Solution Pen-injector Inject 0.75 mg as instructed every 7 days. 4 PEN 6   • metoprolol (TOPROL-XL) 200 MG XL tablet Take 1 Tab by mouth every day. 90 Tab 3   • Blood Glucose Monitoring Suppl Supplies Misc One Touch ultra glucometer strips for blood sugar check up to 5 times a day 450 Each 2   •  atorvastatin (LIPITOR) 20 MG Tab Take 1 Tab by mouth every day. 90 Tab 2   • allopurinol (ZYLOPRIM) 300 MG Tab Take 150 mg by mouth every day.     • ascorbic acid (VITAMIN C) 500 MG tablet Take 500 mg by mouth every day.     • Cholecalciferol (VITAMIN D) 2000 units Cap Take 1 Cap by mouth every evening.     • insulin lispro (HUMALOG) 100 UNIT/ML Solution Inject 10-14 Units as instructed 3 times a day before meals. 10 units every meal up to , then add 2 units for Every 50 above 150 BG     • Acetaminophen (TYLENOL PO) Take 1,250 mg by mouth 2 times a day as needed. 625 mg each tab     • NON SPECIFIED Apply 1 Application to affected area(s) 3 times a day as needed. Therawax     • fluoxetine (PROZAC) 40 MG capsule Take 1 Cap by mouth every day. 90 Cap 1   • Insulin Glargine (TOUJEO SOLOSTAR) 300 UNIT/ML Solution Pen-injector Inject 15 Units as instructed every evening. 3 PEN 6   • magnesium oxide (MAG-OX) 400 MG Tab Take 400 mg by mouth 3 times a day.     • fenofibrate (TRICOR) 145 MG Tab Take 1 Tab by mouth every day. 90 Tab 3   • losartan (COZAAR) 50 MG Tab Take 1 Tab by mouth every day. 30 Tab 6   • spironolactone (ALDACTONE) 50 MG Tab Take 1 Tab by mouth every day. 90 Tab 3   • aspirin 81 MG tablet Take 81 mg by mouth every day.     • CENTRUM SILVER PO Take 1 Tab by mouth every day.     • [DISCONTINUED] rivaroxaban (XARELTO) 15 MG Tab tablet Take 1 Tab by mouth with dinner. (Patient not taking: Reported on 4/26/2018) 90 Tab 3     No facility-administered encounter medications on file as of 4/26/2018.      Review of Systems   Constitutional: Negative for malaise/fatigue.   Respiratory: Negative for cough and shortness of breath.    Cardiovascular: Negative for chest pain, palpitations, orthopnea, claudication, leg swelling and PND.   Gastrointestinal: Negative for abdominal pain.   Musculoskeletal: Negative for myalgias.        Leg cramps at night.   Neurological: Positive for focal weakness (left hemiparesis).  Negative for dizziness and weakness.        Objective:   /80   Pulse 76   Ht 1.829 m (6')   Wt 90.7 kg (200 lb)   SpO2 96%   BMI 27.12 kg/m²      Physical Exam   Constitutional: He is oriented to person, place, and time. He appears well-developed and well-nourished.   Well appearing male with left hemiparesis seated in wheelchair. Alert and conversive.   HENT:   Head: Normocephalic.   Eyes: EOM are normal.   Neck: Normal range of motion. No JVD present.   Cardiovascular: Normal rate and regular rhythm.  Exam reveals no friction rub.    No murmur heard.  Pulmonary/Chest: Effort normal.   Abdominal: Soft. Bowel sounds are normal.   Musculoskeletal: He exhibits no edema.   Neurological: He is alert and oriented to person, place, and time.   Skin: Skin is warm and dry.   Psychiatric: He has a normal mood and affect.     Today: EKG is normal sinus rhythm without ectopy. No atrial fibrillation.    Assessment:     1. Paroxysmal atrial fibrillation (CMS-[facility])  [facility] EKG (Clinic Performed)   2. Gross hematuria     3. Recurrent UTI         Medical Decision Making:  Today's Assessment / Status / Plan:     Paroxysmal atrial fibrillation: Patient is in a sinus rhythm currently. Since he had hematuria on Xarelto and is in a normal sinus rhythm we will have him remain off anticoagulation and continue with Plavix.    We'll do a Zio Patch determine if he is having atrial fibrillation.    Gross hematuria: Occurred with a bladder infection and Xarelto. No further hematuria. He will follow with other providers.    Recurrent UTI: May have caused hematuria. He will follow with infectious disease and urology.    He will follow up as scheduled with Lorie Tucker in the future as at this time he appears stable from a cardiovascular standpoint. We will get the results of the Zio Patch and determine the next course of action.    Collaborating Provider: Dr. Mitchell Bennett.        No  Recipients

## 2018-04-26 NOTE — TELEPHONE ENCOUNTER
I sent him a Submittable message yesterday. He sent a message that blood sugars readings have improved significantly after starting Trulicity, so I advised him to stay off Humalog for now provided blood sugar readings during the day are staying below 140 most of the times. And if blood sugar readings are in the day are mostly staying above 140 that he can resume Humalog at a lower dose of only 5 units with each meal.

## 2018-04-26 NOTE — TELEPHONE ENCOUNTER
Pt's wife is asking if pt still needs to take Trulicity 10units if blood sugars are over 100? Please advise.

## 2018-04-26 NOTE — PROGRESS NOTES
Chief Complaint   Patient presents with   • Blood in Urine     Previous patient       Subjective:   Av Bob is a 73 y.o. male who presents today with his wife, Usha, to follow-up on hematuria.    The patient was seen by Dr. Flakito Tucker on April 3, 2018. Due to the patient's history of paroxysmal atrial fibrillation occurred when he was septic and the patient's previous stroke, Dr. Mullinstern patient on a Xarelto 50 mg for further stroke prevention.    After the first dose of the Xarelto the patient developed hematuria and dark stools. He took a total of 6 doses of the Xarelto before it was discontinued. He contacted the office and a urinalysis and Hemoccult were ordered. A Hemoccult was done after the patient had his stools returned to normal therefore it was negative.    Patient has a history of recurrent urinary tract infections and has been seen by urology, nephrology and now is referred to infectious disease.    Patient has a complex medical history including coronary artery disease status post stenting. History of CVA, chronic kidney disease, diabetes and gout.    Patient states he feels generally well and has had no further hematuria or dark stools. He is wheelchair bound with left hemiparesis. He denies any palpitations or rapid heart beats. No shortness of breath.    Past Medical History:   Diagnosis Date   • Acute respiratory failure with hypoxia (CMS-HCC) 5/20/2017   • CAD (coronary artery disease)     GIOVANNA to RCA in '97, GIOVANNA X2 to LAD and GIOVANNA X2 to OM in '09   • Cancer (CMS-HCC)     2017; chemo lympoma   • Cataract    • Cerebrovascular accident (CVA) (CMS-HCC) 12/30/2016    Left arm weakness  etiology of stroke not established, lymphoma discovered on MRI evaluation of stroke, L hemiparesis much worse after acute infectious illness in mid 2017, but no specific diagnosed recurrent neurological etiology, all at [facility]   • CKD (chronic kidney disease) stage  3, GFR 30-59 ml/min    • Controlled gout 2014   • Coronary atherosclerosis of native coronary artery     S/P PTCA (percutaneous transluminal coronary angioplasty), RCA, 5/1997, patent on cath 7/10/2009 at the time of interventions on his left anterior descending and circumflex coronary arteries   • Depression    • Diabetes (CMS-HCC)    • Enterococcal septicemia (CMS-HCC) 8/12/2017   • Hypertension    • Hypokalemia 2012    controlled with combination of ACE inhibitor or ARB plus spironolactone   • Hypomagnesemia 08/12/2017    etiology uncertain   • 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 Barry   • NSTEMI (non-ST elevated myocardial infarction) (CMS-HCC) 07/18/2017    complicating UTI with sepsis   • Pain    • Polyneuropathy in diabetes(357.2) 9/11/2013   • Septic shock (CMS-HCC) 5/20/2017   • Skin ulcer of calf (CMS-HCC) 2015    Right, Dr. Terry and wound care   • Stroke (CMS-HCC) 2016    left sided weakness   • Urinary bladder disorder    • Urinary incontinence    • Wound of left leg 2012    Requiring surgery and debridment, Dr. Moore     Past Surgical History:   Procedure Laterality Date   • CYSTOSCOPY STENT PLACEMENT Left 2/12/2018    Procedure: CYSTOSCOPY  ;  Surgeon: Rey Barry M.D.;  Location: SURGERY [facility];  Service: Urology   • URETEROSCOPY Left 2/12/2018    Procedure: URETEROSCOPY- FLEXIBLE  ;  Surgeon: Rey Barry M.D.;  Location: SURGERY [facility];  Service: Urology   • LASERTRIPSY Left 2/12/2018    Procedure: LASERTRIPSY - LITHO  ;  Surgeon: Rey Barry M.D.;  Location: SURGERY [facility];  Service: Urology   • WOUND CLOSURE GENERAL  4/3/2012    Performed by GERHARD MOORE at SURGERY SAME DAY [facility]   • CARDIAC CATH  2009    Stents to LAD, Om   • DAGOBERTO BY LAPAROSCOPY  1998   • ANGIOPLASTY  1997    RCA followed by other stents as noted above.    • CARDIAC CATH  1997     stent RCA   • CATARACT EXTRACTION WITH IOL      bilateral   • LITHOTRIPSY     • TONSILLECTOMY AND ADENOIDECTOMY       Family History   Problem Relation Age of Onset   • Heart Disease Father      CAD   • Diabetes Father    • Cancer Mother    • Psychiatry Mother      Depression   • Depression Mother    • Kidney stones Brother    • Heart Disease Brother    • Psychiatry Brother      Depression   • Depression Brother    • Suicide Attempts Other    • Psychiatry Other      autism     Social History     Social History   • Marital status:      Spouse name: N/A   • Number of children: N/A   • Years of education: N/A     Occupational History   • Not on file.     Social History Main Topics   • Smoking status: Never Smoker   • Smokeless tobacco: Never Used   • Alcohol use No   • Drug use: No   • Sexual activity: Not Currently     Partners: Female     Other Topics Concern   • Not on file     Social History Narrative   • No narrative on file     Allergies   Allergen Reactions   • Diphenhydramine Hcl Anxiety     Pt is able to tolerate  Mg benadryl with less anxiety   • Lorazepam Unspecified     Disorientation   • Ciprofloxacin      Rash,stomach ache     Outpatient Encounter Prescriptions as of 4/26/2018   Medication Sig Dispense Refill   • clopidogrel (PLAVIX) 75 MG Tab TAKE 1 TABLET BY MOUTH EVERY DAY 90 Tab 1   • Dulaglutide (TRULICITY) 0.75 MG/0.5ML Solution Pen-injector Inject 0.75 mg as instructed every 7 days. 4 PEN 6   • metoprolol (TOPROL-XL) 200 MG XL tablet Take 1 Tab by mouth every day. 90 Tab 3   • Blood Glucose Monitoring Suppl Supplies Misc One Touch ultra glucometer strips for blood sugar check up to 5 times a day 450 Each 2   • atorvastatin (LIPITOR) 20 MG Tab Take 1 Tab by mouth every day. 90 Tab 2   • allopurinol (ZYLOPRIM) 300 MG Tab Take 150 mg by mouth every day.     • ascorbic acid (VITAMIN C) 500 MG tablet Take 500 mg by mouth every day.     • Cholecalciferol (VITAMIN D) 2000 units Cap Take 1 Cap by  mouth every evening.     • insulin lispro (HUMALOG) 100 UNIT/ML Solution Inject 10-14 Units as instructed 3 times a day before meals. 10 units every meal up to , then add 2 units for Every 50 above 150 BG     • Acetaminophen (TYLENOL PO) Take 1,250 mg by mouth 2 times a day as needed. 625 mg each tab     • NON SPECIFIED Apply 1 Application to affected area(s) 3 times a day as needed. Therawax     • fluoxetine (PROZAC) 40 MG capsule Take 1 Cap by mouth every day. 90 Cap 1   • Insulin Glargine (TOUJEO SOLOSTAR) 300 UNIT/ML Solution Pen-injector Inject 15 Units as instructed every evening. 3 PEN 6   • magnesium oxide (MAG-OX) 400 MG Tab Take 400 mg by mouth 3 times a day.     • fenofibrate (TRICOR) 145 MG Tab Take 1 Tab by mouth every day. 90 Tab 3   • losartan (COZAAR) 50 MG Tab Take 1 Tab by mouth every day. 30 Tab 6   • spironolactone (ALDACTONE) 50 MG Tab Take 1 Tab by mouth every day. 90 Tab 3   • aspirin 81 MG tablet Take 81 mg by mouth every day.     • CENTRUM SILVER PO Take 1 Tab by mouth every day.     • [DISCONTINUED] rivaroxaban (XARELTO) 15 MG Tab tablet Take 1 Tab by mouth with dinner. (Patient not taking: Reported on 4/26/2018) 90 Tab 3     No facility-administered encounter medications on file as of 4/26/2018.      Review of Systems   Constitutional: Negative for malaise/fatigue.   Respiratory: Negative for cough and shortness of breath.    Cardiovascular: Negative for chest pain, palpitations, orthopnea, claudication, leg swelling and PND.   Gastrointestinal: Negative for abdominal pain.   Musculoskeletal: Negative for myalgias.        Leg cramps at night.   Neurological: Positive for focal weakness (left hemiparesis). Negative for dizziness and weakness.        Objective:   /80   Pulse 76   Ht 1.829 m (6')   Wt 90.7 kg (200 lb)   SpO2 96%   BMI 27.12 kg/m²     Physical Exam   Constitutional: He is oriented to person, place, and time. He appears well-developed and well-nourished.   Well  appearing male with left hemiparesis seated in wheelchair. Alert and conversive.   HENT:   Head: Normocephalic.   Eyes: EOM are normal.   Neck: Normal range of motion. No JVD present.   Cardiovascular: Normal rate and regular rhythm.  Exam reveals no friction rub.    No murmur heard.  Pulmonary/Chest: Effort normal.   Abdominal: Soft. Bowel sounds are normal.   Musculoskeletal: He exhibits no edema.   Neurological: He is alert and oriented to person, place, and time.   Skin: Skin is warm and dry.   Psychiatric: He has a normal mood and affect.     Today: EKG is normal sinus rhythm without ectopy. No atrial fibrillation.    Assessment:     1. Paroxysmal atrial fibrillation (CMS-[facility])  [facility] EKG (Clinic Performed)   2. Gross hematuria     3. Recurrent UTI         Medical Decision Making:  Today's Assessment / Status / Plan:     Paroxysmal atrial fibrillation: Patient is in a sinus rhythm currently. Since he had hematuria on Xarelto and is in a normal sinus rhythm we will have him remain off anticoagulation and continue with Plavix.    We'll do a Zio Patch determine if he is having atrial fibrillation.    Gross hematuria: Occurred with a bladder infection and Xarelto. No further hematuria. He will follow with other providers.    Recurrent UTI: May have caused hematuria. He will follow with infectious disease and urology.    He will follow up as scheduled with Lorie Tucker in the future as at this time he appears stable from a cardiovascular standpoint. We will get the results of the Zio Patch and determine the next course of action.    Collaborating Provider: Dr. Mitchell Bennett.

## 2018-05-01 ENCOUNTER — OFFICE VISIT (OUTPATIENT)
Dept: INFECTIOUS DISEASES | Facility: MEDICAL CENTER | Age: 73
End: 2018-05-01
Payer: MEDICARE

## 2018-05-01 VITALS
DIASTOLIC BLOOD PRESSURE: 60 MMHG | OXYGEN SATURATION: 95 % | SYSTOLIC BLOOD PRESSURE: 102 MMHG | TEMPERATURE: 97.1 F | HEART RATE: 71 BPM | HEIGHT: 72 IN

## 2018-05-01 DIAGNOSIS — E11.3293 CONTROLLED TYPE 2 DIABETES MELLITUS WITH BOTH EYES AFFECTED BY MILD NONPROLIFERATIVE RETINOPATHY WITHOUT MACULAR EDEMA, WITHOUT LONG-TERM CURRENT USE OF INSULIN (HCC): Chronic | ICD-10-CM

## 2018-05-01 DIAGNOSIS — N18.30 CKD (CHRONIC KIDNEY DISEASE) STAGE 3, GFR 30-59 ML/MIN (HCC): ICD-10-CM

## 2018-05-01 DIAGNOSIS — I63.40 CEREBROVASCULAR ACCIDENT (CVA) DUE TO EMBOLISM OF CEREBRAL ARTERY (HCC): Chronic | ICD-10-CM

## 2018-05-01 DIAGNOSIS — N39.0 RECURRENT UTI: Chronic | ICD-10-CM

## 2018-05-01 PROCEDURE — 99214 OFFICE O/P EST MOD 30 MIN: CPT | Performed by: INTERNAL MEDICINE

## 2018-05-01 NOTE — PROGRESS NOTES
Chief Complaint   Patient presents with   • New Patient     Reoccuring UTI's         HISTORY OF THE PRESENT ILLNESS: Patient is a 73 y.o. male. 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  No dysuria, flank pain, fever, AMS, prostatitisor other complaints     Prior treatments only for a week at a time. Poorly tolerated abx in the past with diarrhea-helped somewhat by probiotics  Does not do scheduled voiding, poor fluid intake (so he can decrease frequency of urination)    Recently BS have improved due to change in DM meds-now in  range    Allergies: Diphenhydramine hcl; Lorazepam; and Ciprofloxacin    Current Outpatient Prescriptions Ordered in [employer]   Medication Sig Dispense Refill   • Probiotic Product (PROBIOTIC DAILY PO) Take  by mouth.     • clopidogrel (PLAVIX) 75 MG Tab TAKE 1 TABLET BY MOUTH EVERY DAY 90 Tab 1   • Dulaglutide (TRULICITY) 0.75 MG/0.5ML Solution Pen-injector Inject 0.75 mg as instructed every 7 days. 4 PEN 6   • metoprolol (TOPROL-XL) 200 MG XL tablet Take 1 Tab by mouth every day. 90 Tab 3   • Blood Glucose Monitoring Suppl Supplies Misc One Touch ultra glucometer strips for blood sugar check up to 5 times a day 450 Each 2   • atorvastatin (LIPITOR) 20 MG Tab Take 1 Tab by mouth every day. 90 Tab 2   • allopurinol (ZYLOPRIM) 300 MG Tab Take 150 mg by mouth every day.     • ascorbic acid (VITAMIN C) 500 MG tablet Take 500 mg by mouth  every day.     • Cholecalciferol (VITAMIN D) 2000 units Cap Take 1 Cap by mouth every evening.     • insulin lispro (HUMALOG) 100 UNIT/ML Solution Inject 10-14 Units as instructed 3 times a day before meals. 10 units every meal up to , then add 2 units for Every 50 above 150 BG     • Acetaminophen (TYLENOL PO) Take 1,250 mg by mouth 2 times a day as needed. 625 mg each tab     • NON SPECIFIED Apply 1 Application to affected area(s) 3 times a day as needed. Therawax     • fluoxetine (PROZAC) 40 MG capsule Take 1 Cap by mouth every day. 90 Cap 1   • Insulin Glargine (TOUJEO SOLOSTAR) 300 UNIT/ML Solution Pen-injector Inject 15 Units as instructed every evening. 3 PEN 6   • magnesium oxide (MAG-OX) 400 MG Tab Take 400 mg by mouth 3 times a day.     • fenofibrate (TRICOR) 145 MG Tab Take 1 Tab by mouth every day. 90 Tab 3   • losartan (COZAAR) 50 MG Tab Take 1 Tab by mouth every day. 30 Tab 6   • spironolactone (ALDACTONE) 50 MG Tab Take 1 Tab by mouth every day. 90 Tab 3   • aspirin 81 MG tablet Take 81 mg by mouth every day.     • CENTRUM SILVER PO Take 1 Tab by mouth every day.       No current [facility]-ordered facility-administered medications on file.        Past Medical History:   Diagnosis Date   • Acute respiratory failure with hypoxia ([facility]) 5/20/2017   • CAD (coronary artery disease)     GIOVANNA to RCA in '97, GIOVANNA X2 to LAD and GIOVANNA X2 to OM in '09   • Cancer ([facility])     2017; chemo lympoma   • Cataract    • Cerebrovascular accident (CVA) ([facility]) 12/30/2016    Left arm weakness  etiology of stroke not established, lymphoma discovered on MRI evaluation of stroke, L hemiparesis much worse after acute infectious illness in mid 2017, but no specific diagnosed recurrent neurological etiology, all at [facility]   • CKD (chronic kidney disease) stage 3, GFR 30-59 ml/min    • Controlled gout 2014   • Coronary atherosclerosis of native coronary artery     S/P PTCA (percutaneous transluminal  coronary angioplasty), RCA, 5/1997, patent on cath 7/10/2009 at the time of interventions on his left anterior descending and circumflex coronary arteries   • Depression    • Diabetes ([facility])    • Enterococcal septicemia ([facility]) 8/12/2017   • Hypertension    • Hypokalemia 2012    controlled with combination of ACE inhibitor or ARB plus spironolactone   • Hypomagnesemia 08/12/2017    etiology uncertain   • 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 Barry   • NSTEMI (non-ST elevated myocardial infarction) ([facility]) 07/18/2017    complicating UTI with sepsis   • Pain    • Polyneuropathy in diabetes(357.2) 9/11/2013   • Septic shock ([facility]) 5/20/2017   • Skin ulcer of calf ([facility]) 2015    Right, Dr. Terry and wound care   • Stroke ([facility]) 2016    left sided weakness   • Urinary bladder disorder    • Urinary incontinence    • Wound of left leg 2012    Requiring surgery and debridment, Dr. Moore       Past Surgical History:   Procedure Laterality Date   • CYSTOSCOPY STENT PLACEMENT Left 2/12/2018    Procedure: CYSTOSCOPY  ;  Surgeon: Rey Barry M.D.;  Location: SURGERY [facility];  Service: Urology   • URETEROSCOPY Left 2/12/2018    Procedure: URETEROSCOPY- FLEXIBLE  ;  Surgeon: Rey Barry M.D.;  [facility];  Service: Urology   • LASERTRIPSY Left 2/12/2018    Procedure: LASERTRIPSY - LITHO  ;  Surgeon: Rey Barry M.D.;  [facility];  Service: Urology   • WOUND CLOSURE GENERAL  4/3/2012    Performed by GERHARD MOORE at SURGERY SAME DAY [facility]   • CARDIAC CATH  2009    Stents to LAD, Om   • DAGOBERTO BY LAPAROSCOPY  1998   • ANGIOPLASTY  1997    RCA followed by other stents as noted above.    • CARDIAC CATH  1997    stent RCA   • CATARACT EXTRACTION WITH IOL      bilateral   • LITHOTRIPSY     • TONSILLECTOMY AND ADENOIDECTOMY         Social History   Substance Use Topics   •  Smoking status: Never Smoker   • Smokeless tobacco: Never Used   • Alcohol use No       Family History   Problem Relation Age of Onset   • Heart Disease Father      CAD   • Diabetes Father    • Cancer Mother    • Psychiatry Mother      Depression   • Depression Mother    • Kidney stones Brother    • Heart Disease Brother    • Psychiatry Brother      Depression   • Depression Brother    • Suicide Attempts Other    • Psychiatry Other      autism       Review of Systems   Constitutional: Negative for fever, chills, weight loss +malaise/fatigue.     All other systems reviewed and are negative except as in HPI.      Exam: Blood pressure 102/60, pulse 71, temperature 36.2 °C (97.1 °F), height 1.829 m (6'), SpO2 95 %.  General: Normal appearing. No distress.  HEENT: NCAT. PERRLA, EOMI, conjunctiva clear, oropharynx is without erythema, edema or exudates. Dentition fair  Neck: Supple   Pulmonary: Clear to ausculation.  Unlabored  Cardiovascular: IRR  Abdomen: Soft, nontender, nondistended. Normal bowel sounds.  Extremities: No cyanosis, clubbing  Neurologic: Alert and oriented X3.  No tremor. Speech normal without dysarthria.  CN intact Left hemiparesis  Lymph: No cervical or supraclavicular lymph nodes are palpable  Skin: Warm and dry.  No obvious lesions. No rash  Musculoskeletal: Uses wheelchair  Psych: Normal mood and affect.  Judgment and insight is normal.  Lab Results   Component Value Date/Time    WBC 9.4 04/13/2018 12:51 PM    RBC 4.76 04/13/2018 12:51 PM    HEMOGLOBIN 14.0 04/13/2018 12:51 PM    HEMATOCRIT 42.6 04/13/2018 12:51 PM    MCV 89.5 04/13/2018 12:51 PM    MCH 29.4 04/13/2018 12:51 PM    MCHC 32.9 (L) 04/13/2018 12:51 PM    MPV 10.4 04/13/2018 12:51 PM    NEUTSPOLYS 83.10 (H) 02/01/2018 02:53 PM    LYMPHOCYTES 8.40 (L) 02/01/2018 02:53 PM    MONOCYTES 6.30 02/01/2018 02:53 PM    EOSINOPHILS 1.10 02/01/2018 02:53 PM    BASOPHILS 0.30 02/01/2018 02:53 PM    HYPOCHROMIA 1+ 03/21/2012 01:08 PM      Lab  Results   Component Value Date/Time    SODIUM 136 04/05/2018 10:35 AM    POTASSIUM 3.7 04/05/2018 10:35 AM    CHLORIDE 103 04/05/2018 10:35 AM    CO2 25 04/05/2018 10:35 AM    GLUCOSE 175 (H) 04/05/2018 10:35 AM    BUN 38 (H) 04/05/2018 10:35 AM    CREATININE 1.71 (H) 04/05/2018 10:35 AM    CREATININE 1.4 05/28/2008 05:42 PM    BUNCREATRAT 10 03/27/2017 02:11 PM        Assessment/Plan  1. Recurrent UTI      Different enteric pathogens-suppression not likely to be helpful, poorly tolerated, and increases risk for Cdiff. Preventative strategies discussed: increased fluid, scheduled voiding, sit or stand up to pee, control BS etc. Test urine only if symptomatic.  If does have UTI will need to be treated for sufficient duration-2 to 3 weeks; one week is too short.  IV abx if no oral option   2. 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 CKD (chronic kidney disease) stage 3, GFR 30-59 ml/min      with kidney stones-dose adkust abx as needed. FU Urology and Renal as scheduled   4. Cerebrovascular accident (CVA) due to embolism of cerebral artery ([facility])      Contributing to above-scheduled voiding and increase fluids

## 2018-05-03 ENCOUNTER — NON-PROVIDER VISIT (OUTPATIENT)
Dept: WOUND CARE | Facility: MEDICAL CENTER | Age: 73
End: 2018-05-03
Attending: INTERNAL MEDICINE
Payer: MEDICARE

## 2018-05-03 PROCEDURE — 97602 WOUND(S) CARE NON-SELECTIVE: CPT

## 2018-05-03 NOTE — WOUND TEAM
[facility]  [facility]  [address]  [address]  [place] [address]  [phone] Fax: [phone]      80 Day Certification   For Certification Period: 04/16/2018 - 07/06/2018      Referring Physician: Trinidad Maguire  Primary Physician:     Dr. Mitchell Pantoja MD   Consulting Physicians:         Wound(s):S91.009s R lateral malleolus, L lateral foot - dry eschar/scab areas. Scab on L dorsal 2nd toe  Start of Care: 01/24/2018       Subjective:        HPI: Pt is a 72 year old diabetic gentleman with a hx of cardiovascular disease, past CVA with L hemiparesis who is referred by PCP for eval of necrotic areas on R lateral malleolus and L lateral foot. He also has scabbed areas on L dorsal 2nd toe and several small dry scabs on LLE. Pt is wc bound, transfers with assist.  He has PN and LOPS dez feet.              Pain: pt denies pain            Current Medications:   Current Outpatient Prescriptions:   •  Probiotic Product (PROBIOTIC DAILY PO), Take  by mouth., Disp: , Rfl:   •  clopidogrel (PLAVIX) 75 MG Tab, TAKE 1 TABLET BY MOUTH EVERY DAY, Disp: 90 Tab, Rfl: 1  •  Dulaglutide (TRULICITY) 0.75 MG/0.5ML Solution Pen-injector, Inject 0.75 mg as instructed every 7 days., Disp: 4 PEN, Rfl: 6  •  metoprolol (TOPROL-XL) 200 MG XL tablet, Take 1 Tab by mouth every day., Disp: 90 Tab, Rfl: 3  •  Blood Glucose Monitoring Suppl Supplies Misc, One Touch ultra glucometer strips for blood sugar check up to 5 times a day, Disp: 450 Each, Rfl: 2  •  atorvastatin (LIPITOR) 20 MG Tab, Take 1 Tab by mouth every day., Disp: 90 Tab, Rfl: 2  •  allopurinol (ZYLOPRIM) 300 MG Tab, Take 150 mg by mouth every day., Disp: , Rfl:   •  ascorbic acid (VITAMIN C) 500 MG tablet, Take 500 mg by mouth every day., Disp: , Rfl:   •  Cholecalciferol (VITAMIN D) 2000 units Cap, Take 1 Cap by mouth every evening., Disp: , Rfl:   •  insulin lispro (HUMALOG) 100 UNIT/ML Solution, Inject 10-14 Units as instructed 3 times a day  before meals. 10 units every meal up to , then add 2 units for Every 50 above 150 BG, Disp: , Rfl:   •  Acetaminophen (TYLENOL PO), Take 1,250 mg by mouth 2 times a day as needed. 625 mg each tab, Disp: , Rfl:   •  NON SPECIFIED, Apply 1 Application to affected area(s) 3 times a day as needed. Therawax, Disp: , Rfl:   •  fluoxetine (PROZAC) 40 MG capsule, Take 1 Cap by mouth every day., Disp: 90 Cap, Rfl: 1  •  Insulin Glargine (TOUJEO SOLOSTAR) 300 UNIT/ML Solution Pen-injector, Inject 15 Units as instructed every evening., Disp: 3 PEN, Rfl: 6  •  magnesium oxide (MAG-OX) 400 MG Tab, Take 400 mg by mouth 3 times a day., Disp: , Rfl:   •  fenofibrate (TRICOR) 145 MG Tab, Take 1 Tab by mouth every day., Disp: 90 Tab, Rfl: 3  •  losartan (COZAAR) 50 MG Tab, Take 1 Tab by mouth every day., Disp: 30 Tab, Rfl: 6  •  spironolactone (ALDACTONE) 50 MG Tab, Take 1 Tab by mouth every day., Disp: 90 Tab, Rfl: 3  •  aspirin 81 MG tablet, Take 81 mg by mouth every day., Disp: , Rfl:   •  CENTRUM SILVER PO, Take 1 Tab by mouth every day., Disp: , Rfl:       Allergies: Diphenhydramine hcl; Lorazepam; Ciprofloxacin; and Nkda [no known drug allergy]      Objective:      Tests and Measures: Last A1c 01/15/2018 - 7.6 per EPIC. FBS today 177, yesterday 138 per spouse. Pt states it is usually <150.   Monofilament test reveals R - 1/10 sites sensate, L - 0/10 sites sensate - LOPS dez.     Vascular - PADMINI performed by myself and fady Shea -              Right Left   dp - 184 dp - 112   Pt - 180  dp 110   Brachial - 160 (not done - L hemiparisis from CVA)   PADMINI PADMINI     dp - 1.15  Pt - 1.12 dp - 0.7  Pt - 0.68      Doppler exam reveals monophasic waveforms to L dp/pt/ant tib, and biphasic R dp, ant tib possibly monophasic to R pt. Arterial wound healing studies ordered BLE. PAR asked to schedule pt with Dr. Terry for vascular consult.    02/08/2018 - vascular study results from [facility] -   Findings   Bilateral.    Doppler waveform of  the common femoral artery is of high amplitude and    triphasic.    Doppler waveforms at the ankle are brisk and biphasic.    Absent flow within the Left VIVIENNE.     Arterial duplex scan was performed in accordance with lower extremity    arterial evaluation protocol - see separate report.    Lower Extremity   Arterial Duplex Report     Vascular Laboratory   CONCLUSIONS   Right Lower Extremity-   Normal flow from the common femoral artery extending distally to the    popliteal artery. Flow within the posterior tibial artery is brisk and    multiphasic.   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     Left Lower Extremity-   Normal flow seen from the common femoral artery extending distally to the    popliteal artery.   Area of elevated velocities seen within the mid segment of the posterior    tibial artery. Consistent with >50% stenosis.   Occlusion of the anterior tibial artery at the mid-distal segment, minimal    flow reversal seen at the level of the ankle.     ELIGIO MADRIGAL     Exam Date:     02/07/2018 14:07        Orthotic, protective, supportive devices: pt is wc bound secondary to L hemiparisis from CVA    Fall Risk Assessment (naomy all that apply with an X):             X   65 years or older                     Fall within the last 2 years, uses   X   Ambulatory devices   X   Loss of protective sensation in feet,          Use of prostethic/orthotic, years                     Presence of lower extremity/foot/toe amputation                   Taking medication that increases risk (per facility policy)  Verbal and written fall prevention info given. Pt is wc bound and transfers with assist.     Wound Characteristics                                                    Location:R lateral malleolus   Initial Evaluation  Date:01/24/2018 80 Day Certification Encounter Date: 04/19/2018 Encounter Date: 05/03/2018   Tissue Type and %: 100% dry  black scab/eschar 100% moist pink 100% moist red   Periwound: intact intact Mild erythema   Drainage: None Scant sanguinous  Scant serous   Exposed structures None none None   Wound Edges:   Closed with scab/eschar open Open   Odor: None none None   S&S of Infection:   None none None   Edema: None none None   Sensation: PN - LOPS LOPS LOPS               Measurements: Initial Evaluation  Date:01/24/2018 80 Day Certification Encounter Date: 04/19/2018 Encounter Date: 05/03/2018   Length (cm) 0.9 0.3 0.2   Width (cm) 0.9 0.3 0.2   Depth (cm) na 0.1 0.1   Area (cm2) 0.81cm2 0.09cm2 0.04cm 2   Tract/undermine na none None            Wound Characteristics                                                    Location:   L dorsal 2nd toe Initial Evaluation  Date:  03/05/2018 80 Day Certification Encounter Date: 04/19/2018 Encounter Date: 05/03/2018   Tissue Type and %: 100% dry yellow/eschar 100% moist pink 100% moist red   Periwound: Mild erythema Mild erythema Mild erythema   Drainage: None Scant serosanguinous Scant serous   Exposed structures BAN none None   Wound Edges:   Closed open Open   Odor: None none None   S&S of Infection:   None Mild erythema, edema None   Edema: Local Localized None   Sensation: LOPS LOPS LOPS               Measurements: L dorsal 2nd toe   Initial Evaluation  Date: 03/05/2018 80 Day Certification Encounter Date: 04/19/2018 Encounter Date: 05/03/2018   Length (cm) 0.7 0.5 0.2   Width (cm) 0.6 0.7 0.3   Depth (cm) BAN 0.2 0.1   Area (cm2) 0.42 cm2 0.35cm2 0.06cm2   Tract/undermine BAN None None                  Procedures:     Debridement :  Nonselective with NS and gauze, and cotton tip applicator   Cleansed with: NS/Gauze                                                                       Periwound protected with: skin prep   Primary dressing: AqAg moistened with NS   Secondary Dressing: Nonadhesive foams secured with hypafix   Other: Komprex foam horseshoe secured with hypafix on R lateral  malleolus     Patient Education: POC and wound progress discussed with pt and spouse. Wound with viable tissue, discussed rationale for AqAg, pt and spouse agreeable. Advised to keep dressing CDI, unless it becomes soiled or wet, then change outer dressing. Pt has improving blister like area on L medial 1st MTH that spouse has been applying betadine on, advised to continue betadine daily. Pt and spouse verbalizes understanding. Both pt and spouse knowledgeable on s/s of infection and when to go to ER.     Professional Collaboration: None today    Assessment:      Wound etiology: DFU, possible arterial disease    Wound Progress:  Wounds smaller per measurement.    Rationale for Treatment: AqAg to manage bioburden, absorb exudate, and maintain moist wound environment without laterally wicking exudate therefore reducing liana-wound maceration. Non-adhesive foam for additional absorption, pad, and protect.    Patient tolerance/compliance: Pt tolerated care well. Compliant with POC and appointments.    Complicating factors: DM, possible PVD    Need for ongoing Advanced Wound Care services:continued skilled wound care for debridement as needed, dressing management and skilled clinical observation to prevent complications and expedite healing.        Plan:      Treatment Plan and Recommendations: keep areas clean and dry until arterial status has been determined. Pt to get arterial studies asap    Diagnosis/ICD10: S91.009s R lateral malleolus, L lateral foot - dry eschar/scab areas. Scab on L dorsal 2nd toe    Procedures/CPT: nonselective debridement 43036    Frequency: 1 x week      Treatment Goals: STG 2 Weeks  LTG 4 Weeks   Granulation Tissue: Pending vascular consultation %   Decrease Necrotic Tissue to: % %   Wound Phase:      Decrease Size by: % %   Periwound:      Decrease tracts/undermining by: % %   Decrease Pain:          At the time of each visit a thorough assessment of the patient is completed to assure  the  appropriateness of our plan of care.  The dressings or modalities may need to be adapted   from the original plan to address any significant changes in the wound environment.

## 2018-05-10 ENCOUNTER — TELEPHONE (OUTPATIENT)
Dept: CARDIOLOGY | Facility: MEDICAL CENTER | Age: 73
End: 2018-05-10

## 2018-05-10 ENCOUNTER — NON-PROVIDER VISIT (OUTPATIENT)
Dept: WOUND CARE | Facility: MEDICAL CENTER | Age: 73
End: 2018-05-10
Attending: INTERNAL MEDICINE
Payer: MEDICARE

## 2018-05-10 ENCOUNTER — NON-PROVIDER VISIT (OUTPATIENT)
Dept: CARDIOLOGY | Facility: MEDICAL CENTER | Age: 73
End: 2018-05-10
Attending: NURSE PRACTITIONER
Payer: MEDICARE

## 2018-05-10 DIAGNOSIS — I48.0 PAROXYSMAL ATRIAL FIBRILLATION (HCC): ICD-10-CM

## 2018-05-10 PROCEDURE — 97597 DBRDMT OPN WND 1ST 20 CM/<: CPT

## 2018-05-10 NOTE — WOUND TEAM
[facility]  [facility]  [address]  [address]  [place] [address]  [phone] Fax: [phone]      80 Day Certification   For Certification Period: 04/16/2018 - 07/06/2018      Referring Physician: Trinidad Maguire  Primary Physician:     Dr. Mitchell Pantoja MD   Consulting Physicians:         Wound(s):S91.009s R lateral malleolus, L lateral foot - dry eschar/scab areas. Scab on L dorsal 2nd toe  Start of Care: 01/24/2018       Subjective:        HPI: Pt is a 72 year old diabetic gentleman with a hx of cardiovascular disease, past CVA with L hemiparesis who is referred by PCP for eval of necrotic areas on R lateral malleolus and L lateral foot. He also has scabbed areas on L dorsal 2nd toe and several small dry scabs on LLE. Pt is wc bound, transfers with assist.  He has PN and LOPS dez feet.              Pain: pt denies pain            Current Medications:   Current Outpatient Prescriptions:   •  Probiotic Product (PROBIOTIC DAILY PO), Take  by mouth., Disp: , Rfl:   •  clopidogrel (PLAVIX) 75 MG Tab, TAKE 1 TABLET BY MOUTH EVERY DAY, Disp: 90 Tab, Rfl: 1  •  Dulaglutide (TRULICITY) 0.75 MG/0.5ML Solution Pen-injector, Inject 0.75 mg as instructed every 7 days., Disp: 4 PEN, Rfl: 6  •  metoprolol (TOPROL-XL) 200 MG XL tablet, Take 1 Tab by mouth every day., Disp: 90 Tab, Rfl: 3  •  Blood Glucose Monitoring Suppl Supplies Misc, One Touch ultra glucometer strips for blood sugar check up to 5 times a day, Disp: 450 Each, Rfl: 2  •  atorvastatin (LIPITOR) 20 MG Tab, Take 1 Tab by mouth every day., Disp: 90 Tab, Rfl: 2  •  allopurinol (ZYLOPRIM) 300 MG Tab, Take 150 mg by mouth every day., Disp: , Rfl:   •  ascorbic acid (VITAMIN C) 500 MG tablet, Take 500 mg by mouth every day., Disp: , Rfl:   •  Cholecalciferol (VITAMIN D) 2000 units Cap, Take 1 Cap by mouth every evening., Disp: , Rfl:   •  insulin lispro (HUMALOG) 100 UNIT/ML Solution, Inject 10-14 Units as instructed 3 times a day  before meals. 10 units every meal up to , then add 2 units for Every 50 above 150 BG, Disp: , Rfl:   •  Acetaminophen (TYLENOL PO), Take 1,250 mg by mouth 2 times a day as needed. 625 mg each tab, Disp: , Rfl:   •  NON SPECIFIED, Apply 1 Application to affected area(s) 3 times a day as needed. Therawax, Disp: , Rfl:   •  fluoxetine (PROZAC) 40 MG capsule, Take 1 Cap by mouth every day., Disp: 90 Cap, Rfl: 1  •  Insulin Glargine (TOUJEO SOLOSTAR) 300 UNIT/ML Solution Pen-injector, Inject 15 Units as instructed every evening., Disp: 3 PEN, Rfl: 6  •  magnesium oxide (MAG-OX) 400 MG Tab, Take 400 mg by mouth 3 times a day., Disp: , Rfl:   •  fenofibrate (TRICOR) 145 MG Tab, Take 1 Tab by mouth every day., Disp: 90 Tab, Rfl: 3  •  losartan (COZAAR) 50 MG Tab, Take 1 Tab by mouth every day., Disp: 30 Tab, Rfl: 6  •  spironolactone (ALDACTONE) 50 MG Tab, Take 1 Tab by mouth every day., Disp: 90 Tab, Rfl: 3  •  aspirin 81 MG tablet, Take 81 mg by mouth every day., Disp: , Rfl:   •  CENTRUM SILVER PO, Take 1 Tab by mouth every day., Disp: , Rfl:       Allergies: Diphenhydramine hcl; Lorazepam; Ciprofloxacin; and Nkda [no known drug allergy]      Objective:      Tests and Measures: Last A1c 01/15/2018 - 7.6 per EPIC. FBS today 177, yesterday 138 per spouse. Pt states it is usually <150.   Monofilament test reveals R - 1/10 sites sensate, L - 0/10 sites sensate - LOPS dez.     Vascular - PADMINI performed by myself and fady Shea -              Right Left   dp - 184 dp - 112   Pt - 180  dp 110   Brachial - 160 (not done - L hemiparisis from CVA)   PADMINI PADMINI     dp - 1.15  Pt - 1.12 dp - 0.7  Pt - 0.68      Doppler exam reveals monophasic waveforms to L dp/pt/ant tib, and biphasic R dp, ant tib possibly monophasic to R pt. Arterial wound healing studies ordered BLE. PAR asked to schedule pt with Dr. Terry for vascular consult.    02/08/2018 - vascular study results from [facility] -   Findings   Bilateral.    Doppler waveform of  the common femoral artery is of high amplitude and    triphasic.    Doppler waveforms at the ankle are brisk and biphasic.    Absent flow within the Left VIVIENNE.     Arterial duplex scan was performed in accordance with lower extremity    arterial evaluation protocol - see separate report.    Lower Extremity   Arterial Duplex Report     Vascular Laboratory   CONCLUSIONS   Right Lower Extremity-   Normal flow from the common femoral artery extending distally to the    popliteal artery. Flow within the posterior tibial artery is brisk and    multiphasic.   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     Left Lower Extremity-   Normal flow seen from the common femoral artery extending distally to the    popliteal artery.   Area of elevated velocities seen within the mid segment of the posterior    tibial artery. Consistent with >50% stenosis.   Occlusion of the anterior tibial artery at the mid-distal segment, minimal    flow reversal seen at the level of the ankle.     ELIGIO MADRIGAL     Exam Date:     02/07/2018 14:07        Orthotic, protective, supportive devices: pt is wc bound secondary to L hemiparisis from CVA    Fall Risk Assessment (naomy all that apply with an X):             X   65 years or older                     Fall within the last 2 years, uses   X   Ambulatory devices   X   Loss of protective sensation in feet,          Use of prostethic/orthotic, years                     Presence of lower extremity/foot/toe amputation                   Taking medication that increases risk (per facility policy)  Verbal and written fall prevention info given. Pt is wc bound and transfers with assist.     Wound Characteristics                                                    Location:R lateral malleolus   Initial Evaluation  Date:01/24/2018 80 Day Certification Encounter Date: 04/19/2018 Encounter Date: 05/10/2018   Tissue Type and %: 100% dry  black scab/eschar 100% moist pink 100% scab   Periwound: intact intact Mild erythema   Drainage: None Scant sanguinous  None   Exposed structures None none None   Wound Edges:   Closed with scab/eschar open Closed   Odor: None none None   S&S of Infection:   None none None   Edema: None none None   Sensation: PN - LOPS LOPS LOPS               Measurements: Initial Evaluation  Date:01/24/2018 80 Day Certification Encounter Date: 04/19/2018 Encounter Date: 05/10/2018   Length (cm) 0.9 0.3 Scab   Width (cm) 0.9 0.3    Depth (cm) na 0.1    Area (cm2) 0.81cm2 0.09cm2    Tract/undermine na none                 Wound Characteristics                                                    Location:   L dorsal 2nd toe Initial Evaluation  Date:  03/05/2018 80 Day Certification Encounter Date: 04/19/2018 Encounter Date: 05/10/2018   Tissue Type and %: 100% dry yellow/eschar 100% moist pink 100% moist red   Periwound: Mild erythema Mild erythema Mild erythema   Drainage: None Scant serosanguinous Scant serous   Exposed structures BAN none None   Wound Edges:   Closed open Open   Odor: None none None   S&S of Infection:   None Mild erythema, edema None   Edema: Local Localized None   Sensation: LOPS LOPS LOPS               Measurements: L dorsal 2nd toe   Initial Evaluation  Date: 03/05/2018 80 Day Certification Encounter Date: 04/19/2018 Encounter Date: 05/10/2018   Length (cm) 0.7 0.5 0.2   Width (cm) 0.6 0.7 0.3   Depth (cm) BAN 0.2 <0.1   Area (cm2) 0.42 cm2 0.35cm2 0.06cm2   Tract/undermine BAN None None            Procedures:     Debridement :  CSWD using forces to gently remove approx. ~0.1cm2 of perisound crust from L 2nd dorsal toe.   Cleansed with: NS/Gauze                                                                       Periwound protected with: skin prep   Primary dressing: L 2nd toe-AqAg moistened with NS   Secondary Dressing: Nonadhesive foams secured with hypafix   Other: Komprex foam horseshoe secured with  hypafix on R lateral malleolus     Patient Education: POC and wound progress discussed with pt and spouse. R malleolus wound scabbed. Advised to continue to keep dressing CDI, unless it becomes soiled or wet, then change outer dressing. Pt's spouse continues to apply betadine on L medial 1st MTH. Pt and spouse verbalizes understanding. Both pt and spouse knowledgeable on s/s of infection and when to go to ER.     Professional Collaboration: None today    Assessment:      Wound etiology: DFU, possible arterial disease    Wound Progress:  R malleolus scabbed    Rationale for Treatment: AqAg to manage bioburden, absorb exudate, and maintain moist wound environment without laterally wicking exudate therefore reducing liana-wound maceration. Non-adhesive foam for additional absorption, pad, and protect.    Patient tolerance/compliance: Pt tolerated care well. Compliant with POC and appointments.    Complicating factors: DM, possible PVD    Need for ongoing Advanced Wound Care services:continued skilled wound care for debridement as needed, dressing management and skilled clinical observation to prevent complications and expedite healing.        Plan:      Treatment Plan and Recommendations: keep areas clean and dry until arterial status has been determined. Pt to get arterial studies asap    Diagnosis/ICD10: S91.009s R lateral malleolus, L lateral foot - dry eschar/scab areas. Scab on L dorsal 2nd toe    Procedures/CPT: nonselective debridement 01067    Frequency: 1 x week      Treatment Goals: STG 2 Weeks  LTG 4 Weeks   Granulation Tissue: 100% Resolved   Decrease Necrotic Tissue to: 0% 0%   Wound Phase:  Proliferation Maturation   Decrease Size by: 50% Resolved   Periwound:  Intact Intact   Decrease tracts/undermining by: N/A N/A   Decrease Pain:  N/A N/A       At the time of each visit a thorough assessment of the patient is completed to assure the  appropriateness of our plan of care.  The dressings or modalities may  need to be adapted   from the original plan to address any significant changes in the wound environment.

## 2018-05-14 DIAGNOSIS — I10 ESSENTIAL HYPERTENSION, BENIGN: ICD-10-CM

## 2018-05-14 RX ORDER — LOSARTAN POTASSIUM 50 MG/1
50 TABLET ORAL DAILY
Qty: 30 TAB | Refills: 6 | Status: SHIPPED | OUTPATIENT
Start: 2018-05-14 | End: 2018-05-14 | Stop reason: SDUPTHER

## 2018-05-17 ENCOUNTER — NON-PROVIDER VISIT (OUTPATIENT)
Dept: WOUND CARE | Facility: MEDICAL CENTER | Age: 73
End: 2018-05-17
Attending: INTERNAL MEDICINE
Payer: MEDICARE

## 2018-05-17 PROCEDURE — 97597 DBRDMT OPN WND 1ST 20 CM/<: CPT

## 2018-05-17 NOTE — WOUND TEAM
[facility]  [facility]  [address]  [address]  [place] [address]  [phone] Fax: [phone]      Encounter Note  For Certification Period: 04/16/2018 - 07/06/2018      Referring Physician: Trinidad Maguire  Primary Physician:     Dr. Mitchell Pantoja MD   Consulting Physicians:         Wound(s):S91.009s R lateral malleolus, L lateral foot - dry eschar/scab areas. Scab on L dorsal 2nd toe  Start of Care: 01/24/2018       Subjective:        HPI: Pt is a 72 year old diabetic gentleman with a hx of cardiovascular disease, past CVA with L hemiparesis who is referred by PCP for eval of necrotic areas on R lateral malleolus and L lateral foot. He also has scabbed areas on L dorsal 2nd toe and several small dry scabs on LLE. Pt is wc bound, transfers with assist.  He has PN and LOPS dez feet.              Pain: pt denies pain            Current Medications:   Current Outpatient Prescriptions:   •  losartan (COZAAR) 50 MG Tab, TAKE 1 TABLET BY MOUTH EVERY DAY, Disp: 90 Tab, Rfl: 3  •  Probiotic Product (PROBIOTIC DAILY PO), Take  by mouth., Disp: , Rfl:   •  clopidogrel (PLAVIX) 75 MG Tab, TAKE 1 TABLET BY MOUTH EVERY DAY, Disp: 90 Tab, Rfl: 1  •  Dulaglutide (TRULICITY) 0.75 MG/0.5ML Solution Pen-injector, Inject 0.75 mg as instructed every 7 days., Disp: 4 PEN, Rfl: 6  •  metoprolol (TOPROL-XL) 200 MG XL tablet, Take 1 Tab by mouth every day., Disp: 90 Tab, Rfl: 3  •  Blood Glucose Monitoring Suppl Supplies Misc, One Touch ultra glucometer strips for blood sugar check up to 5 times a day, Disp: 450 Each, Rfl: 2  •  atorvastatin (LIPITOR) 20 MG Tab, Take 1 Tab by mouth every day., Disp: 90 Tab, Rfl: 2  •  allopurinol (ZYLOPRIM) 300 MG Tab, Take 150 mg by mouth every day., Disp: , Rfl:   •  ascorbic acid (VITAMIN C) 500 MG tablet, Take 500 mg by mouth every day., Disp: , Rfl:   •  Cholecalciferol (VITAMIN D) 2000 units Cap, Take 1 Cap by mouth every evening., Disp: , Rfl:   •  insulin  lispro (HUMALOG) 100 UNIT/ML Solution, Inject 10-14 Units as instructed 3 times a day before meals. 10 units every meal up to , then add 2 units for Every 50 above 150 BG, Disp: , Rfl:   •  Acetaminophen (TYLENOL PO), Take 1,250 mg by mouth 2 times a day as needed. 625 mg each tab, Disp: , Rfl:   •  NON SPECIFIED, Apply 1 Application to affected area(s) 3 times a day as needed. Therawax, Disp: , Rfl:   •  fluoxetine (PROZAC) 40 MG capsule, Take 1 Cap by mouth every day., Disp: 90 Cap, Rfl: 1  •  Insulin Glargine (TOUJEO SOLOSTAR) 300 UNIT/ML Solution Pen-injector, Inject 15 Units as instructed every evening., Disp: 3 PEN, Rfl: 6  •  magnesium oxide (MAG-OX) 400 MG Tab, Take 400 mg by mouth 3 times a day., Disp: , Rfl:   •  fenofibrate (TRICOR) 145 MG Tab, Take 1 Tab by mouth every day., Disp: 90 Tab, Rfl: 3  •  spironolactone (ALDACTONE) 50 MG Tab, Take 1 Tab by mouth every day., Disp: 90 Tab, Rfl: 3  •  aspirin 81 MG tablet, Take 81 mg by mouth every day., Disp: , Rfl:   •  CENTRUM SILVER PO, Take 1 Tab by mouth every day., Disp: , Rfl:       Allergies: Diphenhydramine hcl; Lorazepam; Ciprofloxacin; and Nkda [no known drug allergy]      Objective:      Tests and Measures: Last A1c 01/15/2018 - 7.6 per EPIC. FBS today 177, yesterday 138 per spouse. Pt states it is usually <150.   Monofilament test reveals R - 1/10 sites sensate, L - 0/10 sites sensate - LOPS dez.     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 Arterial wound healing studies ordered BLE. PAR asked to schedule pt with Dr. Terry for vascular consult.    02/08/2018 - vascular study results from [facility] -   Findings   Bilateral.    Doppler waveform of  the common femoral artery is of high amplitude and    triphasic.    Doppler waveforms at the ankle are brisk and biphasic.    Absent flow within the Left VIVIENNE.     Arterial duplex scan was performed in accordance with lower extremity    arterial evaluation protocol - see separate report.    Lower Extremity   Arterial Duplex Report     Vascular Laboratory   CONCLUSIONS   Right Lower Extremity-   Normal flow from the common femoral artery extending distally to the    popliteal artery. Flow within the posterior tibial artery is brisk and    multiphasic.   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     Left Lower Extremity-   Normal flow seen from the common femoral artery extending distally to the    popliteal artery.   Area of elevated velocities seen within the mid segment of the posterior    tibial artery. Consistent with >50% stenosis.   Occlusion of the anterior tibial artery at the mid-distal segment, minimal    flow reversal seen at the level of the ankle.     ELIGIO MADRIGAL     Exam Date:     02/07/2018 14:07        Orthotic, protective, supportive devices: pt is wc bound secondary to L hemiparisis from CVA    Fall Risk Assessment (naomy all that apply with an X):             X   65 years or older                     Fall within the last 2 years, uses   X   Ambulatory devices   X   Loss of protective sensation in feet,          Use of prostethic/orthotic, years                     Presence of lower extremity/foot/toe amputation                   Taking medication that increases risk (per facility policy)  Verbal and written fall prevention info given. Pt is wc bound and transfers with assist.     Wound Characteristics                                                    Location:R lateral malleolus   Initial Evaluation  Date:01/24/2018 80 Day Certification Encounter Date: 04/19/2018 Encounter Date: 05/17/2018   Tissue Type and %: 100% dry  black scab/eschar 100% moist pink 100% scab   Periwound: intact intact Mild erythema   Drainage: None Scant sanguinous  None   Exposed structures None none None   Wound Edges:   Closed with scab/eschar open Closed   Odor: None none None   S&S of Infection:   None none None   Edema: None none None   Sensation: PN - LOPS LOPS LOPS               Measurements: Initial Evaluation  Date:01/24/2018 80 Day Certification Encounter Date: 04/19/2018 Encounter Date: 05/17/2018   Length (cm) 0.9 0.3 Resolved   Width (cm) 0.9 0.3    Depth (cm) na 0.1    Area (cm2) 0.81cm2 0.09cm2    Tract/undermine na none         Wound Characteristics                                                    Location:   L dorsal 2nd toe Initial Evaluation  Date:  03/05/2018 80 Day Certification Encounter Date: 04/19/2018 Encounter Date: 05/17/2018   Tissue Type and %: 100% dry yellow/eschar 100% moist pink 100% moist red   Periwound: Mild erythema Mild erythema Mild erythema   Drainage: None Scant serosanguinous Scant serous   Exposed structures BAN none None   Wound Edges:   Closed open Open   Odor: None none None   S&S of Infection:   None Mild erythema, edema None   Edema: Local Localized None   Sensation: LOPS LOPS LOPS               Measurements: L dorsal 2nd toe   Initial Evaluation  Date: 03/05/2018 80 Day Certification Encounter Date: 04/19/2018 Encounter Date: 05/17/2018   Length (cm) 0.7 0.5 0.2   Width (cm) 0.6 0.7 0.2   Depth (cm) BAN 0.2 <0.1   Area (cm2) 0.42 cm2 0.35cm2 0.04cm2   Tract/undermine BAN None None            Procedures:     Debridement :  CSWD using forces to gently remove approx. ~0.1cm2 of perisound crust from L 2nd dorsal toe.   Cleansed with: NS/Gauze                                                                       Periwound protected with: skin prep   Primary dressing: L 2nd toe-AqAg moistened with NS   Secondary Dressing: Nonadhesive foams secured with hypafix   Other: Komprex foam horseshoe secured with hypafix on  R lateral malleolus     Patient Education: POC and wound progress discussed with pt and spouse. R malleolus wound scabbed. Advised to continue to keep dressing CDI, unless it becomes soiled or wet, then change outer dressing.  Pt and spouse verbalizes understanding. Both pt and spouse knowledgeable on s/s of infection and when to go to ER.      Professional Collaboration: None today    Assessment:      Wound etiology: DFU, possible arterial disease    Wound Progress:  R malleolus resolved, right 2nd digit nearly resolved, anticipating discharge next visit.    Rationale for Treatment: AqAg to manage bioburden, absorb exudate, and maintain moist wound environment without laterally wicking exudate therefore reducing liana-wound maceration. Non-adhesive foam for additional absorption, pad, and protect.    Patient tolerance/compliance: Pt tolerated care well. Compliant with POC and appointments.    Complicating factors: DM, possible PVD    Need for ongoing Advanced Wound Care services:continued skilled wound care for debridement as needed, dressing management and skilled clinical observation to prevent complications and expedite healing.        Plan:      Treatment Plan and Recommendations: keep areas clean and dry until arterial status has been determined. Pt to get arterial studies asap    Diagnosis/ICD10: S91.009s R lateral malleolus, L lateral foot - dry eschar/scab areas. Scab on L dorsal 2nd toe    Procedures/CPT: nonselective debridement 34848    Frequency: 1 x week      Treatment Goals: STG 2 Weeks  LTG 4 Weeks   Granulation Tissue: 100% Resolved   Decrease Necrotic Tissue to: 0% 0%   Wound Phase:  Proliferation Maturation   Decrease Size by: 50% Resolved   Periwound:  Intact Intact   Decrease tracts/undermining by: N/A N/A   Decrease Pain:  N/A N/A       At the time of each visit a thorough assessment of the patient is completed to assure the  appropriateness of our plan of care.  The dressings or modalities may  need to be adapted   from the original plan to address any significant changes in the wound environment.

## 2018-05-24 ENCOUNTER — NON-PROVIDER VISIT (OUTPATIENT)
Dept: WOUND CARE | Facility: MEDICAL CENTER | Age: 73
End: 2018-05-24
Attending: INTERNAL MEDICINE
Payer: MEDICARE

## 2018-05-24 PROCEDURE — 99212 OFFICE O/P EST SF 10 MIN: CPT

## 2018-05-24 NOTE — CERTIFICATION
Advanced Wound Care   [facility]   [address]   [address]   [place] [address]   [phone] Fax: [phone]    Discharge Note      Referring Physician: Trinidad Maguire  Wound Etiology: DFU  Wound location: Left dorsal 2nd digit, right lateral malleolus  ICD-10: S91.009S (ICD-10-CM) - Wound of ankle, unspecified laterality, sequela  Date of Discharge: 05/24/2018    Assessment:  Discharge patient at this time secondary to wound resolution.  Pt instr dc today, keep area clean, it will be fragile for a few days, bathe and dry area gently, only ever regains a maximum of 80% of the tensile strength of the surrounding skin, remodeling of scar can continue for 6mo - a year. Contact PCP for a referral back her if any problems with area opening and draining again. Pt understands.      Thank you for the referral and the opportunity to treat your patient.      Clinician Signature: _____________________________ Date:_______________

## 2018-05-29 ENCOUNTER — OFFICE VISIT (OUTPATIENT)
Dept: BEHAVIORAL HEALTH | Facility: PHYSICIAN GROUP | Age: 73
End: 2018-05-29
Payer: MEDICARE

## 2018-05-29 DIAGNOSIS — F32.A DEPRESSION, UNSPECIFIED DEPRESSION TYPE: ICD-10-CM

## 2018-05-29 PROCEDURE — 99213 OFFICE O/P EST LOW 20 MIN: CPT | Performed by: NURSE PRACTITIONER

## 2018-05-29 NOTE — PROGRESS NOTES
[facility]  PSYCHIATRIC FOLLOW-UP NOTE    Name: Av Bob  MRN: [id number]  : 1945  Age: 73 y.o.  Date of assessment: 2018  PCP: Mitchell Pantoja M.D.  Persons in attendance: Patient and Spouse/Partner  Total face-to-face time: 20 minutes    REASON FOR VISIT/CHIEF COMPLAINT (as stated by Patient and Spouse/Partner):  Av Bob is a 73 y.o., White male, attending follow-up appointment for recheck of depression/med recheck.      HISTORY OF PRESENT ILLNESS:    Av reports overall he is doing well with his mood. Feeling physically better than he has for quite some time, he is motivated to try hard again to gain strength and be able to walk again. He is using wheelchair today, but at home has been more ambulatory with his walker and wants to start trying to use his cane again. He is not feeling depressed, is continuing to enjoy time with is family especially his grandchildren. No suicidal thoughts. Sleep continues to be problematic at night, does sleep some during the day. He is still taking fluoxetine 40 mg daily, has not continued with individual therapy since his counselor left the practice and doesn't really feel he needs it at this time.     PSYCHOSOCIAL CHANGES SINCE PREVIOUS CONTACT:  none    MEDICATION SIDE EFFECTS: none    REVIEW OF SYSTEMS:      General appearance: casually dressed  male, seated in wheelchair today. Good grooming/hygiene. Pleasant and friendly  Constitutional negative - no fever/chills   Eyes not tested   Ears/Nose/Mouth/Throat not tested   Cardiovascular not tested   Respiratory negative - no shortness of breath or acute distress   Gastrointestinal negative - denies GI upset, no loss of appetite   Genitourinary positive - frequent urinary incontinence, both during the day and throughout the night.    Muscular not tested   Integumentary not tested   Neurological not tested   Endocrine not tested   Hematologic/Lymphatic not tested        PSYCHIATRIC EXAMINATION/MENTAL STATUS  There were no vitals taken for this visit.  Participation: Active verbal participation, Attentive, Engaged and Open to feedback  Grooming:Casual and Neat  Orientation: Fully Oriented  Eye contact: Good  Behavior:Calm   Mood: Euthymic and Happy  Affect: Full range, Congruent with content and smiling  Thought process: Logical and Goal-directed  Thought content:  Within normal limits  Speech: Rate within normal limits and Volume within normal limits  Perception:  Within normal limits  Memory:  No gross evidence of memory deficits  Insight: Good  Judgment: Good  Family/couple interaction observations:   Other:    Current risk:    Suicide: Not applicable   Homicide: Not applicable   Self-harm: Not applicable  Relapse: Not applicable  Other:   Crisis Safety Plan reviewed?No  If evidence of imminent risk is present, intervention/plan:    Medical Records/Labs/Diagnostic Tests Reviewed: none    Medical Records/Labs/Diagnostic Tests Ordered: none    DIAGNOSTIC IMPRESSION(S):  1. Depression, unspecified depression type           ASSESSMENT AND PLAN:  -mood is improved, continue with fluoxetine 40 mg daily  -reviewed plan to stay on medication for one year of remission before deciding to come off of it and at that time we will first reduce his dose. Patient is agreeable to the plan  -discussed he can restart therapy here with another therapist if he chooses, can put in a referral for in house therapy again if needed.  -agreeable to follow up in 4 months or sooner if needed  -referred to PCP for issue of incontinence      Current Outpatient Prescriptions:   •  losartan (COZAAR) 50 MG Tab, TAKE 1 TABLET BY MOUTH EVERY DAY, Disp: 90 Tab, Rfl: 3  •  clopidogrel (PLAVIX) 75 MG Tab, TAKE 1 TABLET BY MOUTH EVERY DAY, Disp: 90 Tab, Rfl: 1  •  Dulaglutide (TRULICITY) 0.75 MG/0.5ML Solution Pen-injector, Inject 0.75 mg as instructed every 7 days., Disp: 4 PEN, Rfl: 6  •  metoprolol (TOPROL-XL)  200 MG XL tablet, Take 1 Tab by mouth every day., Disp: 90 Tab, Rfl: 3  •  Blood Glucose Monitoring Suppl Supplies Misc, One Touch ultra glucometer strips for blood sugar check up to 5 times a day, Disp: 450 Each, Rfl: 2  •  atorvastatin (LIPITOR) 20 MG Tab, Take 1 Tab by mouth every day., Disp: 90 Tab, Rfl: 2  •  allopurinol (ZYLOPRIM) 300 MG Tab, Take 150 mg by mouth every day., Disp: , Rfl:   •  ascorbic acid (VITAMIN C) 500 MG tablet, Take 500 mg by mouth every day., Disp: , Rfl:   •  Cholecalciferol (VITAMIN D) 2000 units Cap, Take 1 Cap by mouth every evening., Disp: , Rfl:   •  insulin lispro (HUMALOG) 100 UNIT/ML Solution, Inject 10-14 Units as instructed 3 times a day before meals. 10 units every meal up to , then add 2 units for Every 50 above 150 BG, Disp: , Rfl:   •  Acetaminophen (TYLENOL PO), Take 1,250 mg by mouth 2 times a day as needed. 625 mg each tab, Disp: , Rfl:   •  fluoxetine (PROZAC) 40 MG capsule, Take 1 Cap by mouth every day., Disp: 90 Cap, Rfl: 1  •  Insulin Glargine (TOUJEO SOLOSTAR) 300 UNIT/ML Solution Pen-injector, Inject 15 Units as instructed every evening., Disp: 3 PEN, Rfl: 6  •  magnesium oxide (MAG-OX) 400 MG Tab, Take 400 mg by mouth 3 times a day., Disp: , Rfl:   •  fenofibrate (TRICOR) 145 MG Tab, Take 1 Tab by mouth every day., Disp: 90 Tab, Rfl: 3  •  aspirin 81 MG tablet, Take 81 mg by mouth every day., Disp: , Rfl:   •  CENTRUM SILVER PO, Take 1 Tab by mouth every day., Disp: , Rfl:   •  Probiotic Product (PROBIOTIC DAILY PO), Take  by mouth., Disp: , Rfl:   •  NON SPECIFIED, Apply 1 Application to affected area(s) 3 times a day as needed. Therawax, Disp: , Rfl:   •  spironolactone (ALDACTONE) 50 MG Tab, Take 1 Tab by mouth every day., Disp: 90 Tab, Rfl: 3    Recheck 4 months or sooner if needed    CHANELLE Lu

## 2018-05-31 ENCOUNTER — TELEPHONE (OUTPATIENT)
Dept: CARDIOLOGY | Facility: MEDICAL CENTER | Age: 73
End: 2018-05-31

## 2018-05-31 ENCOUNTER — NON-PROVIDER VISIT (OUTPATIENT)
Dept: WOUND CARE | Facility: MEDICAL CENTER | Age: 73
End: 2018-05-31
Attending: INTERNAL MEDICINE
Payer: MEDICARE

## 2018-05-31 PROCEDURE — 11721 DEBRIDE NAIL 6 OR MORE: CPT

## 2018-05-31 NOTE — CERTIFICATION
[facility]   [address]   [address]   [place] [address]   [phone] Fax: [phone]      Foot and Nail Assessment   Initial Eval 05/31/2018      Referring Physician: Sandra SEALS  Primary Physician:  Mitchell Pantoja MD  Consulting Physicians:   Start of Care: 03/01/2018       Subjective:        HPI: Pt is a 72 year old diabetic gentleman with a hx of cardiovascular disease, past CVA with L hemiparesis who is referred by PCP for eval of necrotic areas on R lateral malleolus and L lateral foot. He also has scabbed areas on L dorsal 2nd toe and several small dry scabs on LLE. Pt is wc bound, transfers with assist.  He has PN and LOPS dez feet. He is referred for foot and nail care due to dystrophic, mycotic nails that pt is unable to care for himself any longer.    History of foot ulceration(s): Yes_x___  No_____        Location:____resolved. Pt has a small dry blood blister on L 2nd dorsal foot today 05/31/2018_________________________________________________    History of foot surgery: Yes____  No_x___    Describe:_____________________________________________________      Employed: Y ___ N _x__ Job description: __retired______________________________   Current Residence: ______home with spouse___________________________      Pain: pt denies pain today        Past Medical History:   Past Medical History:   Diagnosis Date   • Acute respiratory failure with hypoxia (HCC) 5/20/2017   • CAD (coronary artery disease)     GIOVANNA to RCA in '97, GIOVANNA X2 to LAD and GIOVANNA X2 to OM in '09   • Cancer (HCC)     2017; chemo lympoma   • Cataract    • Cerebrovascular accident (CVA) (HCC) 12/30/2016    Left arm weakness  etiology of stroke not established, lymphoma discovered on MRI evaluation of stroke, L hemiparesis much worse after acute infectious illness in mid 2017, but no specific diagnosed recurrent neurological etiology, all at [facility]   • CKD (chronic kidney  disease) stage 3, GFR 30-59 ml/min    • Controlled gout 2014   • Coronary atherosclerosis of native coronary artery     S/P PTCA (percutaneous transluminal coronary angioplasty), RCA, 5/1997, patent on cath 7/10/2009 at the time of interventions on his left anterior descending and circumflex coronary arteries   • Depression    • Diabetes ([facility])    • Enterococcal septicemia ([facility]) 8/12/2017   • Hypertension    • Hypokalemia 2012    controlled with combination of ACE inhibitor or ARB plus spironolactone   • Hypomagnesemia 08/12/2017    etiology uncertain   • 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 Barry   • NSTEMI (non-ST elevated myocardial infarction) ([facility]) 07/18/2017    complicating UTI with sepsis   • Pain    • Polyneuropathy in diabetes(357.2) 9/11/2013   • Septic shock ([facility]) 5/20/2017   • Skin ulcer of calf ([facility]) 2015    Right, Dr. Terry and wound care   • Stroke ([facility]) 2016    left sided weakness   • Urinary bladder disorder    • Urinary incontinence    • Wound of left leg 2012    Requiring surgery and debridment, Dr. Moore       Current Medications: no med changes per pt  :   Current Outpatient Prescriptions:   •  losartan (COZAAR) 50 MG Tab, TAKE 1 TABLET BY MOUTH EVERY DAY, Disp: 90 Tab, Rfl: 3  •  Probiotic Product (PROBIOTIC DAILY PO), Take  by mouth., Disp: , Rfl:   •  clopidogrel (PLAVIX) 75 MG Tab, TAKE 1 TABLET BY MOUTH EVERY DAY, Disp: 90 Tab, Rfl: 1  •  Dulaglutide (TRULICITY) 0.75 MG/0.5ML Solution Pen-injector, Inject 0.75 mg as instructed every 7 days., Disp: 4 PEN, Rfl: 6  •  metoprolol (TOPROL-XL) 200 MG XL tablet, Take 1 Tab by mouth every day., Disp: 90 Tab, Rfl: 3  •  Blood Glucose Monitoring Suppl Supplies Misc, One Touch ultra glucometer strips for blood sugar check up to 5 times a day, Disp: 450 Each, Rfl: 2  •  atorvastatin (LIPITOR) 20 MG Tab, Take 1 Tab by mouth every day., Disp: 90 Tab, Rfl:  2  •  allopurinol (ZYLOPRIM) 300 MG Tab, Take 150 mg by mouth every day., Disp: , Rfl:   •  ascorbic acid (VITAMIN C) 500 MG tablet, Take 500 mg by mouth every day., Disp: , Rfl:   •  Cholecalciferol (VITAMIN D) 2000 units Cap, Take 1 Cap by mouth every evening., Disp: , Rfl:   •  insulin lispro (HUMALOG) 100 UNIT/ML Solution, Inject 10-14 Units as instructed 3 times a day before meals. 10 units every meal up to , then add 2 units for Every 50 above 150 BG, Disp: , Rfl:   •  Acetaminophen (TYLENOL PO), Take 1,250 mg by mouth 2 times a day as needed. 625 mg each tab, Disp: , Rfl:   •  NON SPECIFIED, Apply 1 Application to affected area(s) 3 times a day as needed. Therawax, Disp: , Rfl:   •  fluoxetine (PROZAC) 40 MG capsule, Take 1 Cap by mouth every day., Disp: 90 Cap, Rfl: 1  •  Insulin Glargine (TOUJEO SOLOSTAR) 300 UNIT/ML Solution Pen-injector, Inject 15 Units as instructed every evening., Disp: 3 PEN, Rfl: 6  •  magnesium oxide (MAG-OX) 400 MG Tab, Take 400 mg by mouth 3 times a day., Disp: , Rfl:   •  fenofibrate (TRICOR) 145 MG Tab, Take 1 Tab by mouth every day., Disp: 90 Tab, Rfl: 3  •  spironolactone (ALDACTONE) 50 MG Tab, Take 1 Tab by mouth every day., Disp: 90 Tab, Rfl: 3  •  aspirin 81 MG tablet, Take 81 mg by mouth every day., Disp: , Rfl:   •  CENTRUM SILVER PO, Take 1 Tab by mouth every day., Disp: , Rfl:       Objective:    Tests and Measures: Last A1c 01/15/2018 - 7.6 per EPIC. Pt states FBS is usually <150.   Monofilament test reveals R - 1/10 sites sensate, L - 0/10 sites sensate - LOPS dez.           Vascular - PADMINI performed by myself and fady Shea -              Right Left   dp - 184 dp - 112   Pt - 180  dp 110   Brachial - 160 (not done - L hemiparisis from CVA)   PADMINI PADMINI     dp - 1.15  Pt - 1.12 dp - 0.7  Pt - 0.68      Doppler exam reveals monophasic waveforms to L dp/pt/ant tib, and biphasic R dp, ant tib possibly monophasic to R pt. Arterial wound healing studies ordered BLE. PAR  asked to schedule pt with Dr. Terry for vascular consult.    02/08/2018 - vascular study results from EPIC -   Findings   Bilateral.    Doppler waveform of the common femoral artery is of high amplitude and    triphasic.    Doppler waveforms at the ankle are brisk and biphasic.    Absent flow within the Left VIVIENNE.     Arterial duplex scan was performed in accordance with lower extremity    arterial evaluation protocol - see separate report.    R L    Pulses not palpable  DP pulse     PT pulse     Capillary refill < 3 sec       Deformities  R L     2nd Hammer/claw toe     Hallux Valgus     Prominent Metatarsal Heads     Charcot Foot     Drop Foot     Rocker Bottom     Prior amputation:     Other:        Clinical appearance/skin  Dryness___minimal__________ Swelling____none__________ Redness__mild dependent rubor___________Temperature___cool__________  Callus______none_________________________________  Ulceration_____dry small blood blister on L dorsal 2nd toe. Betadine applied______________________________     Clinical appearance/toenails  Onychomycosis___all 10, hallux dez are more affected__________________________  Ingrown toenails_____________________________  Deformities_________________________________  Other______________________________________      Orthotic, protective, supportive devices:  custom shoes and orthotics made by [employer]. - [facility] due to L 2nd hammer toe possibly contacting shoe      Procedures:  Pt's feet were washed with soapy water then dried. Debris removed from between toes with gauze. Cuticle and nailbed margins were established and debris removed from around and beneath toenails with a curette. Dystrophic, onychomycotic nails (10) were trimmed with clippers, then smoothed and finished with dremel. Tea tree oil applied to nailbeds and cuticles. Feet dez were moisturized except for between toes. No nicks or cuts noted. Betadine applied to L  2nd dorsal toe, dry blood blister. Pt's wife instr to do this daily          Patient Education: pt instr s/s infection, when to call MD/go to ER. Instr to moisturize feet and BLE daily with water based moisturizer, except between toes.Instr daily foot checks, apply betadine to dry blood blister on L 2nd dorsal toe daily, report any blisters/open areas and medical attention promptly. Pt instr to make an appt again for 3 months for foot care. Pt with good understanding.      Professional Collaboration: Eval sent to referring provider via [employer]. Pt had a vascular consult on 03/05/2018 with Dr. Terry. 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           Clinician Signature:_Ruba_LETICIA,CFCN,CWS,FACCWS_____________________________Date____05/31/2018______________     Physician Signature:______________________________Date:__________________

## 2018-05-31 NOTE — TELEPHONE ENCOUNTER
----- Message from ZAKIYA Knowles sent at 5/30/2018  5:07 PM PDT -----  Please contact patient with Zio Patch results.  Let her know that it appears she is having episodes of SVT and not atrial fibrillation.  Keep follow-up appointment.

## 2018-06-01 PROCEDURE — 0296T PR EXT ECG > 48HR TO 21 DAY RCRD W/CONECT INTL RCRD: CPT | Performed by: INTERNAL MEDICINE

## 2018-06-01 PROCEDURE — 0298T PR EXT ECG > 48HR TO 21 DAY REVIEW AND INTERPRETATN: CPT | Performed by: INTERNAL MEDICINE

## 2018-06-06 ENCOUNTER — HOSPITAL ENCOUNTER (OUTPATIENT)
Dept: LAB | Facility: MEDICAL CENTER | Age: 73
End: 2018-06-06
Attending: INTERNAL MEDICINE
Payer: MEDICARE

## 2018-06-06 DIAGNOSIS — D64.9 ANEMIA, UNSPECIFIED TYPE: ICD-10-CM

## 2018-06-06 DIAGNOSIS — E55.9 VITAMIN D DEFICIENCY: ICD-10-CM

## 2018-06-06 DIAGNOSIS — N18.30 CKD (CHRONIC KIDNEY DISEASE) STAGE 3, GFR 30-59 ML/MIN (HCC): ICD-10-CM

## 2018-06-06 DIAGNOSIS — N39.0 RECURRENT UTI: ICD-10-CM

## 2018-06-06 PROCEDURE — 36415 COLL VENOUS BLD VENIPUNCTURE: CPT

## 2018-06-06 PROCEDURE — 82306 VITAMIN D 25 HYDROXY: CPT

## 2018-06-06 PROCEDURE — 82043 UR ALBUMIN QUANTITATIVE: CPT

## 2018-06-06 PROCEDURE — 80048 BASIC METABOLIC PNL TOTAL CA: CPT

## 2018-06-06 PROCEDURE — 83970 ASSAY OF PARATHORMONE: CPT

## 2018-06-06 PROCEDURE — 82570 ASSAY OF URINE CREATININE: CPT

## 2018-06-06 PROCEDURE — 85027 COMPLETE CBC AUTOMATED: CPT

## 2018-06-06 PROCEDURE — 81001 URINALYSIS AUTO W/SCOPE: CPT

## 2018-06-07 LAB
25(OH)D3 SERPL-MCNC: 31 NG/ML (ref 30–100)
ANION GAP SERPL CALC-SCNC: 13 MMOL/L (ref 0–11.9)
APPEARANCE UR: ABNORMAL
BACTERIA #/AREA URNS HPF: ABNORMAL /HPF
BILIRUB UR QL STRIP.AUTO: NEGATIVE
BUN SERPL-MCNC: 37 MG/DL (ref 8–22)
CALCIUM SERPL-MCNC: 9.1 MG/DL (ref 8.5–10.5)
CHLORIDE SERPL-SCNC: 100 MMOL/L (ref 96–112)
CO2 SERPL-SCNC: 22 MMOL/L (ref 20–33)
COLOR UR: YELLOW
CREAT SERPL-MCNC: 1.96 MG/DL (ref 0.5–1.4)
CREAT UR-MCNC: 83.9 MG/DL
EPI CELLS #/AREA URNS HPF: ABNORMAL /HPF
ERYTHROCYTE [DISTWIDTH] IN BLOOD BY AUTOMATED COUNT: 45.3 FL (ref 35.9–50)
GLUCOSE SERPL-MCNC: 179 MG/DL (ref 65–99)
GLUCOSE UR STRIP.AUTO-MCNC: NEGATIVE MG/DL
HCT VFR BLD AUTO: 42.6 % (ref 42–52)
HGB BLD-MCNC: 13.9 G/DL (ref 14–18)
KETONES UR STRIP.AUTO-MCNC: NEGATIVE MG/DL
LEUKOCYTE ESTERASE UR QL STRIP.AUTO: ABNORMAL
MCH RBC QN AUTO: 29 PG (ref 27–33)
MCHC RBC AUTO-ENTMCNC: 32.6 G/DL (ref 33.7–35.3)
MCV RBC AUTO: 88.8 FL (ref 81.4–97.8)
MICRO URNS: ABNORMAL
MICROALBUMIN UR-MCNC: 11.9 MG/DL
MICROALBUMIN/CREAT UR: 142 MG/G (ref 0–30)
NITRITE UR QL STRIP.AUTO: NEGATIVE
PH UR STRIP.AUTO: 6.5 [PH]
PLATELET # BLD AUTO: 336 K/UL (ref 164–446)
PMV BLD AUTO: 10.1 FL (ref 9–12.9)
POTASSIUM SERPL-SCNC: 3.8 MMOL/L (ref 3.6–5.5)
PROT UR QL STRIP: 30 MG/DL
PTH-INTACT SERPL-MCNC: 16.6 PG/ML (ref 14–72)
RBC # BLD AUTO: 4.8 M/UL (ref 4.7–6.1)
RBC # URNS HPF: ABNORMAL /HPF
RBC UR QL AUTO: ABNORMAL
SODIUM SERPL-SCNC: 135 MMOL/L (ref 135–145)
SP GR UR STRIP.AUTO: 1.01
UROBILINOGEN UR STRIP.AUTO-MCNC: 0.2 MG/DL
WBC # BLD AUTO: 9.5 K/UL (ref 4.8–10.8)
WBC #/AREA URNS HPF: ABNORMAL /HPF

## 2018-06-13 ENCOUNTER — OFFICE VISIT (OUTPATIENT)
Dept: NEPHROLOGY | Facility: MEDICAL CENTER | Age: 73
End: 2018-06-13
Payer: MEDICARE

## 2018-06-13 ENCOUNTER — TELEPHONE (OUTPATIENT)
Dept: INFECTIOUS DISEASES | Facility: MEDICAL CENTER | Age: 73
End: 2018-06-13

## 2018-06-13 VITALS
RESPIRATION RATE: 14 BRPM | HEART RATE: 63 BPM | SYSTOLIC BLOOD PRESSURE: 118 MMHG | OXYGEN SATURATION: 97 % | WEIGHT: 200 LBS | TEMPERATURE: 97.9 F | DIASTOLIC BLOOD PRESSURE: 70 MMHG | HEIGHT: 72 IN | BODY MASS INDEX: 27.09 KG/M2

## 2018-06-13 DIAGNOSIS — E55.9 VITAMIN D DEFICIENCY: ICD-10-CM

## 2018-06-13 DIAGNOSIS — N18.30 CKD (CHRONIC KIDNEY DISEASE) STAGE 3, GFR 30-59 ML/MIN (HCC): ICD-10-CM

## 2018-06-13 DIAGNOSIS — N39.0 RECURRENT UTI: ICD-10-CM

## 2018-06-13 DIAGNOSIS — E11.21 DIABETIC NEPHROPATHY ASSOCIATED WITH TYPE 2 DIABETES MELLITUS (HCC): ICD-10-CM

## 2018-06-13 DIAGNOSIS — I10 ESSENTIAL HYPERTENSION, BENIGN: ICD-10-CM

## 2018-06-13 DIAGNOSIS — R31.9 URINARY TRACT INFECTION WITH HEMATURIA, SITE UNSPECIFIED: ICD-10-CM

## 2018-06-13 DIAGNOSIS — N39.0 URINARY TRACT INFECTION WITH HEMATURIA, SITE UNSPECIFIED: ICD-10-CM

## 2018-06-13 PROCEDURE — 99214 OFFICE O/P EST MOD 30 MIN: CPT | Performed by: INTERNAL MEDICINE

## 2018-06-13 ASSESSMENT — ENCOUNTER SYMPTOMS
FEVER: 0
HEARTBURN: 0
ABDOMINAL PAIN: 0
NAUSEA: 0
MYALGIAS: 0
COUGH: 0
SHORTNESS OF BREATH: 0
EYES NEGATIVE: 1
BACK PAIN: 0
WEIGHT LOSS: 0
WHEEZING: 0
PALPITATIONS: 0
DIARRHEA: 0
CHILLS: 0
VOMITING: 0
NECK PAIN: 0
FLANK PAIN: 0
ORTHOPNEA: 0
HEMOPTYSIS: 0

## 2018-06-13 NOTE — PROGRESS NOTES
Subjective:      Av Bob is a 73 y.o. male who presents with Follow-Up and Chronic Kidney Disease            Chronic Kidney Disease   Pertinent negatives include no abdominal pain, chest pain, chills, coughing, fever, myalgias, nausea, neck pain or vomiting.     Av is coming today for f/u of CKD III, microalbuminuria  Has long term hx/of Nephrolithiasis -f/u with Urologist -  Baseline creatinine level at 1.2's - worse to 1.8! -now at 1.9  Diagnosed with left renal obstructing kidney stone -treated with lithotripsy  (+) for recurrent UTI -  Repeated urine culture positive for Enterobacter x 2 on Abx  Now UA positive for UTI  Seen in ID clinic-advise to call clinic with next UTI to schedule iv antibiotics  C/o fatigue, low energy level  No difficulties to urinate.  No dysuria  No flank pain  HTN: BP well controlled    Review of Systems   Constitutional: Positive for malaise/fatigue. Negative for chills, fever and weight loss.   HENT: Negative.    Eyes: Negative.    Respiratory: Negative for cough, hemoptysis, shortness of breath and wheezing.    Cardiovascular: Negative for chest pain, palpitations, orthopnea and leg swelling.   Gastrointestinal: Negative for abdominal pain, diarrhea, heartburn, nausea and vomiting.   Genitourinary: Positive for hematuria. Negative for dysuria, flank pain, frequency and urgency.   Musculoskeletal: Negative for back pain, myalgias and neck pain.   Skin: Negative.    Neurological:        Hx/of CVA, on wheel chair   All other systems reviewed and are negative.         Objective:     /70   Pulse 63   Temp 36.6 °C (97.9 °F)   Resp 14   Ht 1.829 m (6')   Wt 90.7 kg (200 lb)   SpO2 97%   BMI 27.12 kg/m²      Physical Exam   Constitutional: He is oriented to person, place, and time. He appears well-developed and well-nourished. No distress.   HENT:   Head: Normocephalic and atraumatic.   Nose: Nose normal.   Mouth/Throat: Oropharynx is clear and moist.    Eyes: Conjunctivae are normal. Pupils are equal, round, and reactive to light.   Neck: Normal range of motion. Neck supple.   Cardiovascular: Normal rate and regular rhythm.  Exam reveals no gallop and no friction rub.    Pulmonary/Chest: Effort normal and breath sounds normal. No respiratory distress. He has no wheezes. He has no rales.   Abdominal: Soft. Bowel sounds are normal. He exhibits no distension. There is no tenderness.   Musculoskeletal: He exhibits no edema.   Neurological: He is alert and oriented to person, place, and time. No cranial nerve deficit. Coordination normal.   Nursing note and vitals reviewed.            Laboratory results reviewed:  Lab Results   Component Value Date/Time    CREATININE 1.96 (H) 06/06/2018 10:27 AM    CREATININE 1.4 05/28/2008 05:42 PM    POTASSIUM 3.8 06/06/2018 10:27 AM       Assessment/Plan:     1. CKD (chronic kidney disease), stage III      Creat slightly worse -to monitor closely      2. Essential hypertension, benign      Well controlled    3. Microalbuminuria due to type 2 diabetes mellitus (CMS-HCC)      Microalb/creat ratio improved-on ARB -to monitor      4. Vitamin D deficiency      Vit D and PTH WNL -continue supplementation      5. Nephrolithiasis      s/p lithotripsy    6.UTI - recurrent     To contact ID to schedule iv antibiotics       Recs;  f/u in ID clinic             To drink plenty of fluids              Avoid NSAID's              Monitor BP              F/u in 2 months

## 2018-06-19 ENCOUNTER — OFFICE VISIT (OUTPATIENT)
Dept: INFECTIOUS DISEASES | Facility: MEDICAL CENTER | Age: 73
End: 2018-06-19
Payer: MEDICARE

## 2018-06-19 ENCOUNTER — TELEPHONE (OUTPATIENT)
Dept: MEDICAL GROUP | Facility: MEDICAL CENTER | Age: 73
End: 2018-06-19

## 2018-06-19 ENCOUNTER — HOSPITAL ENCOUNTER (OUTPATIENT)
Facility: MEDICAL CENTER | Age: 73
End: 2018-06-19
Attending: INTERNAL MEDICINE
Payer: MEDICARE

## 2018-06-19 VITALS
OXYGEN SATURATION: 96 % | SYSTOLIC BLOOD PRESSURE: 128 MMHG | TEMPERATURE: 97.1 F | WEIGHT: 200 LBS | DIASTOLIC BLOOD PRESSURE: 84 MMHG | BODY MASS INDEX: 27.12 KG/M2 | HEART RATE: 67 BPM

## 2018-06-19 DIAGNOSIS — N39.0 RECURRENT UTI: ICD-10-CM

## 2018-06-19 DIAGNOSIS — N18.30 CKD (CHRONIC KIDNEY DISEASE) STAGE 3, GFR 30-59 ML/MIN (HCC): ICD-10-CM

## 2018-06-19 DIAGNOSIS — E11.3293 CONTROLLED TYPE 2 DIABETES MELLITUS WITH BOTH EYES AFFECTED BY MILD NONPROLIFERATIVE RETINOPATHY WITHOUT MACULAR EDEMA, WITHOUT LONG-TERM CURRENT USE OF INSULIN (HCC): Chronic | ICD-10-CM

## 2018-06-19 PROCEDURE — 99214 OFFICE O/P EST MOD 30 MIN: CPT | Performed by: INTERNAL MEDICINE

## 2018-06-19 PROCEDURE — 87186 SC STD MICRODIL/AGAR DIL: CPT

## 2018-06-19 PROCEDURE — 87086 URINE CULTURE/COLONY COUNT: CPT

## 2018-06-19 PROCEDURE — 87077 CULTURE AEROBIC IDENTIFY: CPT

## 2018-06-19 NOTE — TELEPHONE ENCOUNTER
ESTABLISHED PATIENT PRE-VISIT PLANNING     Note: Patient will not be contacted if there is no indication to call.     1.  Reviewed notes from the last few office visits within the medical group: Yes 03/26/2018    2.  If any orders were placed at last visit or intended to be done for this visit (i.e. 6 mos follow-up), do we have Results/Consult Notes?        •  Labs - Labs were not ordered at last office visit.   Note: If patient appointment is for lab review and patient did not complete labs, check with provider if OK to reschedule patient until labs completed.       •  Imaging - Imaging was not ordered at last office visit.       •  Referrals - No referrals were ordered at last office visit.    3. Is this appointment scheduled as a Hospital Follow-Up? No    4.  Immunizations were updated in Epic using WebIZ?: No WebIZ record       •  Web Iz Recommendations: PREVNAR (PCV13)     5.  Patient is due for the following Health Maintenance Topics:   Health Maintenance Due   Topic Date Due   • IMM PNEUMOCOCCAL 65+ (ADULT) HIGH/HIGHEST RISK SERIES (1 of 2 - PCV13) 03/30/2012       6.  MDX printed for Provider? NO    7.  Patient was NOT informed to arrive 15 min prior to their scheduled appointment and bring in their medication bottles.

## 2018-06-19 NOTE — PROGRESS NOTES
"Chief Complaint   Patient presents with   • Follow-Up     UTI       HISTORY OF PRESENT ILLNESS: Patient is a 73 y.o. male established patient who presents today for FU  recurrent UTI  Different pathogens previously isolated incl Kelbsiella, C glabrata, and most recently Ecloacae X2  Pmed Hx also sig for CVA with left hemiparesis  Has long term hx/of Nephrolithiasis -f/u with Urologist - and CKD-follows with Dr Jarvis    Diagnosed with left renal obstructing kidney stone with worsening renal function-treated with lithotripsy  Baseline creatinine level at 1.2's - up to 1.8 -improved to 1.7 then back up to1.9 at last check  After starting on Xarelto + hematuria    No difficulties to urinating or emptying bladder but uses urinal while lying down rather than standing.  No dysuria, flank pain, fever, AMS, prostatitis or other complaints     Prior treatments only for a week at a time. Poorly tolerated abx in the past with diarrhea-helped somewhat by probiotics  Does not do scheduled voiding, poor fluid intake (so he can decrease frequency of urination)    BS  improved due to change in DM meds-now in  range by patient report    Had abnormal UA on 6/6-thought might have infection.  No culture done  Urine culture ordered by me on 6/13-not done   Feels ok, just tired-no frequency, pain, or burning.  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, without long-term current use of insulin (CMS-HCC) 12/30/2016     Priority: Medium   • Mixed hyperlipidemia 12/13/2011     Priority: Medium   • Left hemiparesis (HCC) 12/27/2017     Priority: Low   • Diabetic nephropathy associated with type 2 diabetes mellitus (HCC) " 11/30/2017     Priority: Low   • Psychophysiological insomnia 11/21/2017     Priority: Low   • Moderate episode of recurrent major depressive disorder (HCC) 11/07/2017     Priority: Low   • Wheelchair dependent 11/07/2017     Priority: Low   • Normocytic hypochromic anemia 05/20/2017     Priority: Low   • Diffuse large cell non-Hodgkin's lymphoma (HCC) 05/08/2017     Priority: Low   • CKD (chronic kidney disease) stage 3, GFR 30-59 ml/min 08/25/2016     Priority: Low   • Idiopathic chronic gout of multiple sites without tophus 01/28/2014     Priority: Low   • Diabetic polyneuropathy (CMS-HCC) 09/11/2013     Priority: Low   • Recurrent UTI 04/26/2018   • Dyslipidemia 04/03/2018   • Cerebrovascular accident (CVA) due to embolism of cerebral artery ([facility]) 12/30/2016   • Coronary artery disease involving native coronary artery of native heart without angina pectoris 12/30/2016       Allergies:Diphenhydramine hcl; Lorazepam; and Ciprofloxacin    Current Outpatient Prescriptions   Medication Sig Dispense Refill   • HUMALOG KWIKPEN 100 UNIT/ML Solution Pen-injector injection INJECT 35 UNITS SUBCUTANEOUSLY 3 TIMES DAILY BEFORE MEALS 30 mL 6   • losartan (COZAAR) 50 MG Tab TAKE 1 TABLET BY MOUTH EVERY DAY 90 Tab 3   • Probiotic Product (PROBIOTIC DAILY PO) Take  by mouth.     • clopidogrel (PLAVIX) 75 MG Tab TAKE 1 TABLET BY MOUTH EVERY DAY 90 Tab 1   • Dulaglutide (TRULICITY) 0.75 MG/0.5ML Solution Pen-injector Inject 0.75 mg as instructed every 7 days. 4 PEN 6   • metoprolol (TOPROL-XL) 200 MG XL tablet Take 1 Tab by mouth every day. 90 Tab 3   • Blood Glucose Monitoring Suppl Supplies Misc One Touch ultra glucometer strips for blood sugar check up to 5 times a day 450 Each 2   • atorvastatin (LIPITOR) 20 MG Tab Take 1 Tab by mouth every day. 90 Tab 2   • allopurinol (ZYLOPRIM) 300 MG Tab Take 150 mg by mouth every day.     • ascorbic acid (VITAMIN C) 500 MG tablet Take 500 mg by mouth every day.     • Cholecalciferol  (VITAMIN D) 2000 units Cap Take 1 Cap by mouth every evening.     • Acetaminophen (TYLENOL PO) Take 1,250 mg by mouth 2 times a day as needed. 625 mg each tab     • fluoxetine (PROZAC) 40 MG capsule Take 1 Cap by mouth every day. 90 Cap 1   • Insulin Glargine (TOUJEO SOLOSTAR) 300 UNIT/ML Solution Pen-injector Inject 15 Units as instructed every evening. 3 PEN 6   • magnesium oxide (MAG-OX) 400 MG Tab Take 400 mg by mouth 3 times a day.     • fenofibrate (TRICOR) 145 MG Tab Take 1 Tab by mouth every day. 90 Tab 3   • spironolactone (ALDACTONE) 50 MG Tab Take 1 Tab by mouth every day. 90 Tab 3   • aspirin 81 MG tablet Take 81 mg by mouth every day.     • CENTRUM SILVER PO Take 1 Tab by mouth every day.     • NON SPECIFIED Apply 1 Application to affected area(s) 3 times a day as needed. Therawax       No current facility-administered medications for this visit.        Social History   Substance Use Topics   • Smoking status: Never Smoker   • Smokeless tobacco: Never Used   • Alcohol use No       Family History   Problem Relation Age of Onset   • Heart Disease Father      CAD   • Diabetes Father    • Cancer Mother    • Psychiatry Mother      Depression   • Depression Mother    • Kidney stones Brother    • Heart Disease Brother    • Psychiatry Brother      Depression   • Depression Brother    • Suicide Attempts Other    • Psychiatry Other      autism       ROS:  Review of Systems     All other systems reviewed and are negative except as in HPI.      Exam:  Blood pressure 128/84, pulse 67, temperature 36.2 °C (97.1 °F), weight 90.7 kg (200 lb), SpO2 96 %.  General:  Well nourished, well developed male in NAD. Pleasant and cooperative  Head: NCAT, Pupils are equal round reactive to light. Extraocular movements intact. Oropharynx is clear.  Neck: Supple.  No LAD  Pulmonary: Clear to ausculation.   Unlabored  Cardiovascular: Regular rate and rhythm w  Abdominal: Soft, nontender, nondistended. Positive bowel sounds. No flank  pain  Skin: No rashes.  Extremities: no clubbing, cyanosis, or edema.  Neurologic: Alert and oriented x4. Speech is fluent without dysarthria. Cranial nerves intact. Moving all extremities.   Uses wheelchair    Assessment/Plan:  1. Recurrent UTI  URINE CULTURE(NEW)    Needs culture-will go get today-if ECloacae and still susceptible, plan for Levo for 2-3 weeks  Otherwise per cx result   2. CKD (chronic kidney disease) stage 3, GFR 30-59 ml/min      Dose adjust abx for renal function   3. 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

## 2018-06-25 DIAGNOSIS — A49.8 INFECTION DUE TO ENTEROBACTER CLOACAE: ICD-10-CM

## 2018-06-25 DIAGNOSIS — N39.0 URINARY TRACT INFECTION WITHOUT HEMATURIA, SITE UNSPECIFIED: ICD-10-CM

## 2018-06-25 RX ORDER — LEVOFLOXACIN 500 MG/1
500 TABLET, FILM COATED ORAL DAILY
Qty: 21 TAB | Refills: 0 | Status: SHIPPED | OUTPATIENT
Start: 2018-06-25 | End: 2018-07-16

## 2018-06-26 ENCOUNTER — OFFICE VISIT (OUTPATIENT)
Dept: MEDICAL GROUP | Facility: MEDICAL CENTER | Age: 73
End: 2018-06-26
Payer: MEDICARE

## 2018-06-26 VITALS
DIASTOLIC BLOOD PRESSURE: 68 MMHG | TEMPERATURE: 97.1 F | SYSTOLIC BLOOD PRESSURE: 112 MMHG | OXYGEN SATURATION: 99 % | HEART RATE: 60 BPM

## 2018-06-26 DIAGNOSIS — E11.69 HYPERLIPIDEMIA ASSOCIATED WITH TYPE 2 DIABETES MELLITUS (HCC): ICD-10-CM

## 2018-06-26 DIAGNOSIS — Z99.3 WHEELCHAIR DEPENDENT: ICD-10-CM

## 2018-06-26 DIAGNOSIS — E11.3293 CONTROLLED TYPE 2 DIABETES MELLITUS WITH BOTH EYES AFFECTED BY MILD NONPROLIFERATIVE RETINOPATHY WITHOUT MACULAR EDEMA, WITHOUT LONG-TERM CURRENT USE OF INSULIN (HCC): ICD-10-CM

## 2018-06-26 DIAGNOSIS — G81.94 LEFT HEMIPARESIS (HCC): ICD-10-CM

## 2018-06-26 DIAGNOSIS — N39.0 RECURRENT UTI: ICD-10-CM

## 2018-06-26 DIAGNOSIS — F33.1 MODERATE EPISODE OF RECURRENT MAJOR DEPRESSIVE DISORDER (HCC): ICD-10-CM

## 2018-06-26 DIAGNOSIS — I25.10 CORONARY ARTERY DISEASE INVOLVING NATIVE CORONARY ARTERY OF NATIVE HEART WITHOUT ANGINA PECTORIS: ICD-10-CM

## 2018-06-26 DIAGNOSIS — E78.5 HYPERLIPIDEMIA ASSOCIATED WITH TYPE 2 DIABETES MELLITUS (HCC): ICD-10-CM

## 2018-06-26 PROBLEM — I15.2 HYPERTENSION ASSOCIATED WITH DIABETES (HCC): Status: ACTIVE | Noted: 2017-03-22

## 2018-06-26 PROBLEM — E11.59 HYPERTENSION ASSOCIATED WITH DIABETES (HCC): Status: ACTIVE | Noted: 2017-03-22

## 2018-06-26 PROCEDURE — 99215 OFFICE O/P EST HI 40 MIN: CPT | Performed by: FAMILY MEDICINE

## 2018-06-28 ENCOUNTER — TELEPHONE (OUTPATIENT)
Dept: ENDOCRINOLOGY | Facility: MEDICAL CENTER | Age: 73
End: 2018-06-28

## 2018-06-28 DIAGNOSIS — E11.65 TYPE 2 DIABETES MELLITUS WITH HYPERGLYCEMIA, WITH LONG-TERM CURRENT USE OF INSULIN (HCC): ICD-10-CM

## 2018-06-28 DIAGNOSIS — Z79.4 TYPE 2 DIABETES MELLITUS WITH HYPERGLYCEMIA, WITH LONG-TERM CURRENT USE OF INSULIN (HCC): ICD-10-CM

## 2018-06-28 NOTE — TELEPHONE ENCOUNTER
Pt requesting Ultra fine pen needles to be sent to [facility] on [facility] please. Thank you.

## 2018-06-30 NOTE — ASSESSMENT & PLAN NOTE
Chronic, stable, well-controlled, taking medication as directed.     ROS is NEGATIVE for suicidal and homicidal ideation, and also negative for auditory and visual hallucinations.

## 2018-06-30 NOTE — ASSESSMENT & PLAN NOTE
Chronic, stable, well-controlled, asymptoamtic at present time.  Patient concerned bout recurrence in the future, parituclarly as he has difficulty with ambulation, which then leads him to use diapers most of the day, as well as a urinal when he's in bed.    We then discuss how patient can be given a standing order for him to have UA (with reflex to UCx if UA positive).  Patient verbalized understanding.    ROS is NEGATIVE for fevers, chills, rigors, flank pain, dysuria, hematuria, pyuria, polyuria, increased frequency of urination, diarrhea, constipation.

## 2018-07-01 NOTE — PROGRESS NOTES
Subjective:   Chief Complaint/History of Present Illness:  Av Bob is a 73 y.o. 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    Recurrent UTI  Chronic, stable, well-controlled, asymptoamtic at present time.  Patient concerned bout recurrence in the future, parituclarly as he has difficulty with ambulation, which then leads him to use diapers most of the day, as well as a urinal when he's in bed.    We then discuss how patient can be given a standing order for him to have UA (with reflex to UCx if UA positive).  Patient verbalized understanding.    ROS is NEGATIVE for fevers, chills, rigors, flank pain, dysuria, hematuria, pyuria, polyuria, increased frequency of urination, diarrhea, constipation.    Wheelchair dependent  Chronic, uncontrollled, improving.  Please see notes from same date of service 06/26/18 re: left     Moderate episode of recurrent major depressive disorder (CMS-[facility]) (HCC)  Chronic, stable, well-controlled, taking medication as directed.     ROS is NEGATIVE for suicidal and homicidal ideation, and also negative for auditory and visual hallucinations.    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    We discussed that prediabetes/diabetes mellitus type 2 are medical conditions of lifestyle habits (less than optimal dietary choices, insufficient cardiovascular exercise), and that they can be controlled (in part or in whole) by these lifestyle changes.     Patient is continuing care under Dr. Maguire.     Please see assessment/plan as below for further details.    ROS is NEGATIVE for blurred vision, polydipsia, polyuria, diaphoresis, palpitations, fatigue, irritability, flank pain, BLE paresthesias.    Coronary artery disease involving native coronary artery of native heart without angina pectoris  Chronic, stable, well-controlled, taking medication as directed.     BP well-controlled, HLD controlled, DM2 uncontrolled.     Hyperlipidemia associated with type 2 diabetes mellitus (HCC)  Patient and I discussed recent labs (see below; HLD uncontrolled) and that ASCVD risk is increased based on most recent lipid panel, current blood pressure (hypertensive, with medication), diabetes status (diabetic), and smoking status (non-smoker).    Patient and I then discussed necessary dietary changes to make to address dyslipidemia.  Patient is currently taking cholesterol lowering medication: Atorvastatin.  Patient verbalized understanding.    ROS is NEGATIVE for dizziness, generalized weakness/fatigue, vision/hearing changes, jaw pain/paresthesias, BUE pain/paresthesias/numbness/weakness, chest pain/pressure, palpitations, dyspnea, RUQ abdominal pain, oliguria/anuria, BLE edema.    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, without long-term current use of insulin (CMS-HCC) 12/30/2016     Priority: Medium   • Hyperlipidemia associated with type 2 diabetes mellitus ([facility]) 12/13/2011     Priority: Medium   • Left hemiparesis ([facility]) 12/27/2017     Priority: Low   • Diabetic nephropathy associated with type 2 diabetes mellitus ([facility]) 11/30/2017     Priority: Low   • Psychophysiological insomnia 11/21/2017     Priority: Low   • Moderate episode of recurrent major depressive disorder ([facility]) 11/07/2017     Priority: Low   • Wheelchair dependent 11/07/2017     Priority: Low   • Normocytic hypochromic anemia 05/20/2017     Priority: Low   • Diffuse large cell non-Hodgkin's lymphoma ([facility]) 05/08/2017     Priority: Low   • CKD (chronic kidney disease) stage 3, GFR 30-59 ml/min 08/25/2016     Priority: Low   • Idiopathic chronic gout of multiple sites without tophus 01/28/2014     Priority: Low   • Diabetic polyneuropathy (CMS-HCC) 09/11/2013     Priority: Low   • Recurrent UTI 04/26/2018   • H/O Cerebrovascular accident (CVA) in adulthood 12/30/2016   • Coronary artery disease involving native coronary artery of native heart without angina pectoris 12/30/2016       Additional History:   Allergies:    Diphenhydramine hcl; Lorazepam; and Ciprofloxacin     Current Medications:     Current Outpatient Prescriptions   Medication Sig Dispense Refill   • allopurinol (ZYLOPRIM) 300 MG Tab Take 150 mg by mouth every day.     • fluoxetine (PROZAC) 40 MG capsule Take 1 Cap by mouth every day. 90 Cap 1   • Insulin Pen Needle (BD PEN NEEDLE SWATI U/F) 32G X 4 MM Misc For insulin shots 5 times a day 450 Each 1   • ONE TOUCH ULTRA TEST strip USE TO TEST UP TO FID  2   • levoFLOXacin (LEVAQUIN)  500 MG tablet Take 1 Tab by mouth every day for 21 days. 21 Tab 0   • HUMALOG KWIKPEN 100 UNIT/ML Solution Pen-injector injection INJECT 35 UNITS SUBCUTANEOUSLY 3 TIMES DAILY BEFORE MEALS 30 mL 6   • losartan (COZAAR) 50 MG Tab TAKE 1 TABLET BY MOUTH EVERY DAY 90 Tab 3   • Probiotic Product (PROBIOTIC DAILY PO) Take  by mouth.     • clopidogrel (PLAVIX) 75 MG Tab TAKE 1 TABLET BY MOUTH EVERY DAY 90 Tab 1   • Dulaglutide (TRULICITY) 0.75 MG/0.5ML Solution Pen-injector Inject 0.75 mg as instructed every 7 days. 4 PEN 6   • metoprolol (TOPROL-XL) 200 MG XL tablet Take 1 Tab by mouth every day. 90 Tab 3   • Blood Glucose Monitoring Suppl Supplies Misc One Touch ultra glucometer strips for blood sugar check up to 5 times a day 450 Each 2   • atorvastatin (LIPITOR) 20 MG Tab Take 1 Tab by mouth every day. 90 Tab 2   • ascorbic acid (VITAMIN C) 500 MG tablet Take 500 mg by mouth every day.     • Cholecalciferol (VITAMIN D) 2000 units Cap Take 1 Cap by mouth every evening.     • Acetaminophen (TYLENOL PO) Take 1,250 mg by mouth 2 times a day as needed. 625 mg each tab     • NON SPECIFIED Apply 1 Application to affected area(s) 3 times a day as needed. Therawax     • Insulin Glargine (TOUJEO SOLOSTAR) 300 UNIT/ML Solution Pen-injector Inject 15 Units as instructed every evening. 3 PEN 6   • magnesium oxide (MAG-OX) 400 MG Tab Take 400 mg by mouth 3 times a day.     • fenofibrate (TRICOR) 145 MG Tab Take 1 Tab by mouth every day. 90 Tab 3   • spironolactone (ALDACTONE) 50 MG Tab Take 1 Tab by mouth every day. 90 Tab 3   • aspirin 81 MG tablet Take 81 mg by mouth every day.     • CENTRUM SILVER PO Take 1 Tab by mouth every day.       No current facility-administered medications for this visit.         Social History:     Social History   Substance Use Topics   • Smoking status: Never Smoker   • Smokeless tobacco: Never Used   • Alcohol use No       ROS:     - NOTE: All other systems reviewed and are negative, except as in  HPI.     Objective:   Physical Exam:    Vitals: Blood pressure 112/68, pulse 60, temperature 36.2 °C (97.1 °F), SpO2 99 %.   BMI: There is no height or weight on file to calculate BMI.   General/Constitutional: Vitals as above, Well nourished, well developed male in no acute distress   Head/Eyes: Head is grossly normal & atraumatic, bilateral conjunctivae clear and not injected, bilateral EOMI, bilateral PERRL   ENT: Bilateral external ears grossly normal in appearance, Hearing grossly intact, External nares normal in appearance and without discharge/bleeding   Respiratory: No respiratory distress, bilateral lungs are clear to ausculation in all lung fields (anterior/lateral/posterior), no wheezing/rhonchi/rales   Cardiovascular: Regular rate and rhythm without murmur/gallops/rubs, distal pulses are intact and equal bilaterally (radial, posterior tibial), no bilateral lower extremity edema   MSK: L sided hemiplegia, mild muscular wasting of thenar eminence, Gait grossly normal & not antalgic   Integumentary: No apparent rashes   Psych: Judgment grossly appropriate, no apparent depression/anxiety    Health Maintenance:     - Not addressed at this visit    Imaging/Labs:     - Not addressed at this visit    Assessment and Plan:   1. Recurrent UTI  Chronic, stable, well-controlled.  UA with Rflx to Culture if he becomes symptomatic.  We will prescribe medications as necessary.   - URINALYSIS,CULTURE IF INDICATED; Future    2. Wheelchair dependent  Chronic, uncontrolled, patient would like to restart PT.     - REFERRAL TO PHYSICAL THERAPY Reason for Therapy: Eval/Treat/Report    3. Left hemiparesis (HCC)  Chronic, uncontrolled, with mild hand wasting.  Patient to restart PT, has been advised that he can start OT.   - REFERRAL TO PHYSICAL THERAPY Reason for Therapy: Eval/Treat/Report    4. Moderate episode of recurrent major depressive disorder (HCC)  Chronic, stable, well-controlled on Prozac.    5. Controlled type 2  diabetes mellitus with both eyes affected by mild nonproliferative retinopathy without macular edema, without long-term current use of insulin (CMS-[facility])  Chronic, uncontrolled with most recent A1c at 8.0%.  Continue care with Dr. Maguire.   - LIPID PROFILE; Future    6. Hyperlipidemia associated with type 2 diabetes mellitus (HCC)  Chronic, stable, well-controlled.  Continue taking medication as directed.   - LIPID PROFILE; Future    7. Coronary artery disease involving native coronary artery of native heart without angina pectoris  Chronic, stable, well-controlled.  Continue taking medication as directed.   - LIPID PROFILE; Future    NOTE: A total of 40minutes was spent in direct face-to-face time with the patient, of which over 50% of the time was spent in counseling and/or coordination of care as discussed above.      RTC: in 4months for coordination of care.    PLEASE NOTE: This dictation was created using voice recognition software. I have made every reasonable attempt to correct obvious errors, but I expect that there are errors of grammar and possibly content that I did not discover before finalizing the note.

## 2018-07-01 NOTE — ASSESSMENT & PLAN NOTE
Patient and I discussed recent labs (see below; HLD uncontrolled) and that ASCVD risk is increased based on most recent lipid panel, current blood pressure (hypertensive, with medication), diabetes status (diabetic), and smoking status (non-smoker).    Patient and I then discussed necessary dietary changes to make to address dyslipidemia.  Patient is currently taking cholesterol lowering medication: Atorvastatin.  Patient verbalized understanding.    ROS is NEGATIVE for dizziness, generalized weakness/fatigue, vision/hearing changes, jaw pain/paresthesias, BUE pain/paresthesias/numbness/weakness, chest pain/pressure, palpitations, dyspnea, RUQ abdominal pain, oliguria/anuria, BLE edema.    Lab Results   Component Value Date/Time    CHOLSTRLTOT 110 10/06/2017 09:50 AM    LDL 45 10/06/2017 09:50 AM    HDL 42 10/06/2017 09:50 AM    TRIGLYCERIDE 117 10/06/2017 09:50 AM

## 2018-07-01 NOTE — ASSESSMENT & PLAN NOTE
We discussed that patient is diagnosed with diabetes, chronic, uncontrolled, given that the A1c is:   Lab Results   Component Value Date/Time    HBA1C 8.0 (H) 02/01/2018 02:53 PM        Patient is currently taking (insulin) for glycemic control, and (Losartan, Atorvastatin) for health maintenance related to diabetes mellitus.    We discussed that prediabetes/diabetes mellitus type 2 are medical conditions of lifestyle habits (less than optimal dietary choices, insufficient cardiovascular exercise), and that they can be controlled (in part or in whole) by these lifestyle changes.     Patient is continuing care under Dr. Maguire.     Please see assessment/plan as below for further details.    ROS is NEGATIVE for blurred vision, polydipsia, polyuria, diaphoresis, palpitations, fatigue, irritability, flank pain, BLE paresthesias.

## 2018-07-03 ENCOUNTER — OFFICE VISIT (OUTPATIENT)
Dept: CARDIOLOGY | Facility: MEDICAL CENTER | Age: 73
End: 2018-07-03
Payer: MEDICARE

## 2018-07-03 VITALS
SYSTOLIC BLOOD PRESSURE: 118 MMHG | DIASTOLIC BLOOD PRESSURE: 64 MMHG | WEIGHT: 200 LBS | HEART RATE: 68 BPM | BODY MASS INDEX: 27.09 KG/M2 | HEIGHT: 72 IN | OXYGEN SATURATION: 97 %

## 2018-07-03 DIAGNOSIS — I48.0 PAROXYSMAL ATRIAL FIBRILLATION (HCC): ICD-10-CM

## 2018-07-03 DIAGNOSIS — E11.3293 CONTROLLED TYPE 2 DIABETES MELLITUS WITH BOTH EYES AFFECTED BY MILD NONPROLIFERATIVE RETINOPATHY WITHOUT MACULAR EDEMA, WITHOUT LONG-TERM CURRENT USE OF INSULIN (HCC): ICD-10-CM

## 2018-07-03 DIAGNOSIS — E78.5 HYPERLIPIDEMIA ASSOCIATED WITH TYPE 2 DIABETES MELLITUS (HCC): ICD-10-CM

## 2018-07-03 DIAGNOSIS — I25.10 CORONARY ARTERY DISEASE INVOLVING NATIVE CORONARY ARTERY OF NATIVE HEART WITHOUT ANGINA PECTORIS: ICD-10-CM

## 2018-07-03 DIAGNOSIS — E11.69 HYPERLIPIDEMIA ASSOCIATED WITH TYPE 2 DIABETES MELLITUS (HCC): ICD-10-CM

## 2018-07-03 DIAGNOSIS — I15.2 HYPERTENSION ASSOCIATED WITH DIABETES (HCC): ICD-10-CM

## 2018-07-03 DIAGNOSIS — E11.59 HYPERTENSION ASSOCIATED WITH DIABETES (HCC): ICD-10-CM

## 2018-07-03 DIAGNOSIS — I10 ESSENTIAL HYPERTENSION: ICD-10-CM

## 2018-07-03 DIAGNOSIS — Z86.73 HISTORY OF CEREBROVASCULAR ACCIDENT (CVA) IN ADULTHOOD: ICD-10-CM

## 2018-07-03 PROCEDURE — 99214 OFFICE O/P EST MOD 30 MIN: CPT | Performed by: NURSE PRACTITIONER

## 2018-07-03 RX ORDER — METOPROLOL SUCCINATE 200 MG/1
200 TABLET, EXTENDED RELEASE ORAL EVERY EVENING
Qty: 90 TAB | Refills: 3 | COMMUNITY
Start: 2018-07-03 | End: 2019-03-04 | Stop reason: SDUPTHER

## 2018-07-03 ASSESSMENT — ENCOUNTER SYMPTOMS
COUGH: 0
PND: 0
WEAKNESS: 1
CLAUDICATION: 0
ABDOMINAL PAIN: 0
PALPITATIONS: 0
FOCAL WEAKNESS: 1
MYALGIAS: 0
FEVER: 0
SHORTNESS OF BREATH: 0
ORTHOPNEA: 0

## 2018-07-03 NOTE — PROGRESS NOTES
Subjective:   Av Bob is a 72 y.o. male who presents today for follow up on holter monitor results.    He is a previous patient of Dr. Tucker in our office.    Hx of CAD (GIOVANNA to RCA in '97, GIOVANNA X2 to LAD and GIOVANNA X to OM in '09), CVA, CKD, DM, HTN, HLD, large cell lymphoma, chronic left wound infection, and frequent hospitalizations for sepsis and rehabilitation.     He is in a wheelchair but is functional. He has weakness but is doing well. His wife is in the apt with him today.    He is continuing to see his ID and PCP for all his other medical issues.    He does tell me he is chronically fatigued with his multiple medical issues. He has had no recent chest pains now.    Past Medical History:   Diagnosis Date   • Acute respiratory failure with hypoxia ([facility]) 5/20/2017   • CAD (coronary artery disease)     GIOVANNA to RCA in '97, GIOVANNA X2 to LAD and GIOVANNA X2 to OM in '09   • Cancer ([facility])     2017; chemo lympoma   • Cataract    • Cerebrovascular accident (CVA) ([facility]) 12/30/2016    Left arm weakness  etiology of stroke not established, lymphoma discovered on MRI evaluation of stroke, L hemiparesis much worse after acute infectious illness in mid 2017, but no specific diagnosed recurrent neurological etiology, all at [facility]   • CKD (chronic kidney disease) stage 3, GFR 30-59 ml/min    • Controlled gout 2014   • Coronary atherosclerosis of native coronary artery     S/P PTCA (percutaneous transluminal coronary angioplasty), RCA, 5/1997, patent on cath 7/10/2009 at the time of interventions on his left anterior descending and circumflex coronary arteries   • Depression    • Diabetes ([facility])    • Enterococcal septicemia ([facility]) 8/12/2017   • Hypertension    • Hypokalemia 2012    controlled with combination of ACE inhibitor or ARB plus spironolactone   • Hypomagnesemia 08/12/2017    etiology uncertain   • Lymphoma ([facility]) 2/19/2017    Large cell   • Mixed hyperlipidemia    •  Nephrolithiasis 2006    right kidney subsequent lithotripsy by Dr. Barry   • NSTEMI (non-ST elevated myocardial infarction) ([facility]) 07/18/2017    complicating UTI with sepsis   • Pain    • Polyneuropathy in diabetes(357.2) 9/11/2013   • Septic shock ([facility]) 5/20/2017   • Skin ulcer of calf ([facility]) 2015    Right, Dr. Terry and wound care   • Stroke ([facility]) 2016    left sided weakness   • Urinary bladder disorder    • Urinary incontinence    • Wound of left leg 2012    Requiring surgery and debridment, Dr. Moore     Past Surgical History:   Procedure Laterality Date   • CYSTOSCOPY STENT PLACEMENT Left 2/12/2018    Procedure: CYSTOSCOPY  ;  Surgeon: Rey Barry M.D.;  Location: SURGERY [facility];  Service: Urology   • URETEROSCOPY Left 2/12/2018    Procedure: URETEROSCOPY- FLEXIBLE  ;  Surgeon: Rey Barry M.D.;  Location: SURGERY [facility];  Service: Urology   • LASERTRIPSY Left 2/12/2018    Procedure: LASERTRIPSY - LITHO  ;  Surgeon: Rey Barry M.D.;  Location: SURGERY [facility];  Service: Urology   • WOUND CLOSURE GENERAL  4/3/2012    Performed by GERHARD MOORE at SURGERY SAME DAY [facility] ORS   • CARDIAC CATH  2009    Stents to LAD, Om   • DAGOBERTO BY LAPAROSCOPY  1998   • ANGIOPLASTY  1997    RCA followed by other stents as noted above.    • CARDIAC CATH  1997    stent RCA   • CATARACT EXTRACTION WITH IOL      bilateral   • LITHOTRIPSY     • TONSILLECTOMY AND ADENOIDECTOMY       Family History   Problem Relation Age of Onset   • Heart Disease Father      CAD   • Diabetes Father    • Cancer Mother    • Psychiatry Mother      Depression   • Depression Mother    • Kidney stones Brother    • Heart Disease Brother    • Psychiatry Brother      Depression   • Depression Brother    • Suicide Attempts Other    • Psychiatry Other      autism     History   Smoking Status   • Never Smoker   Smokeless Tobacco   • Never Used     Allergies   Allergen Reactions   • Diphenhydramine Hcl Anxiety     Pt is able  to tolerate  Mg benadryl with less anxiety   • Lorazepam Unspecified     Disorientation   • Ciprofloxacin      Rash,stomach ache     Outpatient Encounter Prescriptions as of 7/3/2018   Medication Sig Dispense Refill   • metoprolol (TOPROL-XL) 200 MG XL tablet Take 1 Tab by mouth every evening. 90 Tab 3   • Insulin Pen Needle (BD PEN NEEDLE SWATI U/F) 32G X 4 MM Misc For insulin shots 5 times a day 450 Each 1   • ONE TOUCH ULTRA TEST strip USE TO TEST UP TO FID  2   • levoFLOXacin (LEVAQUIN) 500 MG tablet Take 1 Tab by mouth every day for 21 days. 21 Tab 0   • HUMALOG KWIKPEN 100 UNIT/ML Solution Pen-injector injection INJECT 35 UNITS SUBCUTANEOUSLY 3 TIMES DAILY BEFORE MEALS 30 mL 6   • losartan (COZAAR) 50 MG Tab TAKE 1 TABLET BY MOUTH EVERY DAY 90 Tab 3   • Probiotic Product (PROBIOTIC DAILY PO) Take  by mouth.     • clopidogrel (PLAVIX) 75 MG Tab TAKE 1 TABLET BY MOUTH EVERY DAY 90 Tab 1   • Dulaglutide (TRULICITY) 0.75 MG/0.5ML Solution Pen-injector Inject 0.75 mg as instructed every 7 days. 4 PEN 6   • Blood Glucose Monitoring Suppl Supplies Misc One Touch ultra glucometer strips for blood sugar check up to 5 times a day 450 Each 2   • atorvastatin (LIPITOR) 20 MG Tab Take 1 Tab by mouth every day. 90 Tab 2   • allopurinol (ZYLOPRIM) 300 MG Tab Take 150 mg by mouth every day.     • ascorbic acid (VITAMIN C) 500 MG tablet Take 500 mg by mouth every day.     • Cholecalciferol (VITAMIN D) 2000 units Cap Take 1 Cap by mouth every evening.     • Acetaminophen (TYLENOL PO) Take 1,250 mg by mouth 2 times a day as needed. 625 mg each tab     • fluoxetine (PROZAC) 40 MG capsule Take 1 Cap by mouth every day. 90 Cap 1   • Insulin Glargine (TOUJEO SOLOSTAR) 300 UNIT/ML Solution Pen-injector Inject 15 Units as instructed every evening. 3 PEN 6   • magnesium oxide (MAG-OX) 400 MG Tab Take 400 mg by mouth 3 times a day.     • fenofibrate (TRICOR) 145 MG Tab Take 1 Tab by mouth every day. 90 Tab 3   • spironolactone  (ALDACTONE) 50 MG Tab Take 1 Tab by mouth every day. 90 Tab 3   • aspirin 81 MG tablet Take 81 mg by mouth every day.     • CENTRUM SILVER PO Take 1 Tab by mouth every day.     • [DISCONTINUED] metoprolol (TOPROL-XL) 200 MG XL tablet Take 1 Tab by mouth every day. 90 Tab 3   • NON SPECIFIED Apply 1 Application to affected area(s) 3 times a day as needed. Therawax       No facility-administered encounter medications on file as of 7/3/2018.      Review of Systems   Constitutional: Positive for malaise/fatigue. Negative for fever.   Respiratory: Negative for cough and shortness of breath.    Cardiovascular: Negative for chest pain, palpitations, orthopnea, claudication, leg swelling and PND.   Gastrointestinal: Negative for abdominal pain.   Musculoskeletal: Negative for myalgias.   Neurological: Positive for focal weakness and weakness.   All other systems reviewed and are negative.       Objective:   /64   Pulse 68   Ht 1.829 m (6')   Wt 90.7 kg (200 lb)   SpO2 97%   BMI 27.12 kg/m²     Physical Exam   Constitutional: He is oriented to person, place, and time. He appears well-developed and well-nourished. No distress.   HENT:   Head: Normocephalic and atraumatic.   Eyes: EOM are normal.   Neck: Normal range of motion. No JVD present.   Cardiovascular: Normal rate, regular rhythm, normal heart sounds and intact distal pulses.    No murmur heard.  Pulmonary/Chest: Effort normal and breath sounds normal. No respiratory distress. He has no wheezes. He has no rales.   Abdominal: Soft. Bowel sounds are normal.   Musculoskeletal:   Cane or wheelchair with weakness on L side S/P CVA   Neurological: He is alert and oriented to person, place, and time.   Skin: Skin is warm and dry.   Psychiatric: He has a normal mood and affect.   Nursing note and vitals reviewed.      Assessment:     1. 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 Hyperlipidemia associated with type 2 diabetes mellitus (HCC)     3. Hypertension associated with diabetes ([facility])     4. Paroxysmal atrial fibrillation (HCC)     5. Coronary artery disease involving native coronary artery of native heart without angina pectoris     6. H/O Cerebrovascular accident (CVA) in adulthood     7. Essential hypertension  metoprolol (TOPROL-XL) 200 MG XL tablet     Medical Decision Making:  Today's Assessment / Status / Plan:     1. CAD with 5 stents (see previous cath report in '09), no angina, BOGGS chronic with debility. Cont toprol, ASA and plavix. Abnormal MPI previously but per ADD that day-no need for repeat angiogram. If angina recurs, repeat angiogram.    2. HTN, good control on bb, arb, aldactone. Follow at home. Move metoprolol to evening with fatigue during the day.    3. DM, managed with insulin. Followed by PCP.    4. PAF, not noted in last note per Dr. Patterson. Follow clinically. ASA, plavix, and toprol at this point. Discuss with Dr. Tucker at next follow up.    5. HLD, statin and fibrate. Yearly CMP and lipid to be done before next apt.    6. CVA, deficits on L side. Therapies continued. Continue ASA, plavix, statin.    FU in clinic in 3 months with JM as new patient    Patient verbalizes understanding and agrees with the plan of care.     Collaborating MD: Johanny GORDON    Physical Exam   Constitutional: He is oriented to person, place, and time. He appears well-developed and well-nourished. No distress.   HENT:   Head: Normocephalic and atraumatic.   Eyes: EOM are normal.   Neck: Normal range of motion. No JVD present.   Cardiovascular: Normal rate, regular rhythm, normal heart sounds and intact distal pulses.    No murmur heard.  Pulmonary/Chest: Effort normal and breath sounds normal. No respiratory distress. He has no wheezes. He has no rales.   Abdominal: Soft. Bowel sounds are normal.   Musculoskeletal:   Cane or  wheelchair with weakness on L side S/P CVA   Neurological: He is alert and oriented to person, place, and time.   Skin: Skin is warm and dry.   Psychiatric: He has a normal mood and affect.   Nursing note and vitals reviewed.

## 2018-07-03 NOTE — LETTER
[facility]   [address], [address]  [place] [address]  Phone: [phone]  Fax: [phone]              Av Bob  1945    Encounter Date: 7/3/2018    CHANELLE Flood          PROGRESS NOTE:  Subjective:   Av Bob is a 72 y.o. male who presents today for follow up on holter monitor results.    He is a previous patient of Dr. Tucker in our office.    Hx of CAD (GIOVANNA to RCA in '97, GIOVANNA X2 to LAD and GIOVANNA X to OM in '09), CVA, CKD, DM, HTN, HLD, large cell lymphoma, chronic left wound infection, and frequent hospitalizations for sepsis and rehabilitation.     He is in a wheelchair but is functional. He has weakness but is doing well. His wife is in the apt with him today.    He is continuing to see his ID and PCP for all his other medical issues.    He does tell me he is chronically fatigued with his multiple medical issues. He has had no recent chest pains now.    Past Medical History:   Diagnosis Date   • Acute respiratory failure with hypoxia ([facility]) 5/20/2017   • CAD (coronary artery disease)     GIOVANNA to RCA in '97, GIOVANNA X2 to LAD and GIOVANNA X2 to OM in '09   • Cancer (HCC)     2017; chemo lympoma   • Cataract    • Cerebrovascular accident (CVA) ([facility]) 12/30/2016    Left arm weakness  etiology of stroke not established, lymphoma discovered on MRI evaluation of stroke, L hemiparesis much worse after acute infectious illness in mid 2017, but no specific diagnosed recurrent neurological etiology, all at [facility]   • CKD (chronic kidney disease) stage 3, GFR 30-59 ml/min    • Controlled gout 2014   • Coronary atherosclerosis of native coronary artery     S/P PTCA (percutaneous transluminal coronary angioplasty), RCA, 5/1997, patent on cath 7/10/2009 at the time of interventions on his left anterior descending and circumflex coronary arteries   • Depression    • Diabetes ([facility])    • Enterococcal  septicemia ([facility]) 8/12/2017   • Hypertension    • Hypokalemia 2012    controlled with combination of ACE inhibitor or ARB plus spironolactone   • Hypomagnesemia 08/12/2017    etiology uncertain   • Lymphoma ([facility]) 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 Barry   • NSTEMI (non-ST elevated myocardial infarction) ([facility]) 07/18/2017    complicating UTI with sepsis   • Pain    • Polyneuropathy in diabetes(357.2) 9/11/2013   • Septic shock ([facility]) 5/20/2017   • Skin ulcer of calf ([facility]) 2015    Right, Dr. Terry and wound care   • Stroke ([facility]) 2016    left sided weakness   • Urinary bladder disorder    • Urinary incontinence    • Wound of left leg 2012    Requiring surgery and debridment, Dr. Moore     Past Surgical History:   Procedure Laterality Date   • CYSTOSCOPY STENT PLACEMENT Left 2/12/2018    Procedure: CYSTOSCOPY  ;  Surgeon: Rey Barry M.D.;  Location: SURGERY [facility];  Service: Urology   • URETEROSCOPY Left 2/12/2018    Procedure: URETEROSCOPY- FLEXIBLE  ;  Surgeon: Rey Barry M.D.;  Location: SURGERY [facility];  Service: Urology   • LASERTRIPSY Left 2/12/2018    Procedure: LASERTRIPSY - LITHO  ;  Surgeon: Rey Barry M.D.;  Location: SURGERY [facility];  Service: Urology   • WOUND CLOSURE GENERAL  4/3/2012    Performed by GERHARD MOORE at SURGERY SAME DAY [facility]   • CARDIAC CATH  2009    Stents to LAD, Om   • DAGOBERTO BY LAPAROSCOPY  1998   • ANGIOPLASTY  1997    RCA followed by other stents as noted above.    • CARDIAC CATH  1997    stent RCA   • CATARACT EXTRACTION WITH IOL      bilateral   • LITHOTRIPSY     • TONSILLECTOMY AND ADENOIDECTOMY       Family History   Problem Relation Age of Onset   • Heart Disease Father      CAD   • Diabetes Father    • Cancer Mother    • Psychiatry Mother      Depression   • Depression Mother    • Kidney stones Brother    • Heart Disease Brother    • Psychiatry Brother       Depression   • Depression Brother    • Suicide Attempts Other    • Psychiatry Other      autism     History   Smoking Status   • Never Smoker   Smokeless Tobacco   • Never Used     Allergies   Allergen Reactions   • Diphenhydramine Hcl Anxiety     Pt is able to tolerate  Mg benadryl with less anxiety   • Lorazepam Unspecified     Disorientation   • Ciprofloxacin      Rash,stomach ache     Outpatient Encounter Prescriptions as of 7/3/2018   Medication Sig Dispense Refill   • metoprolol (TOPROL-XL) 200 MG XL tablet Take 1 Tab by mouth every evening. 90 Tab 3   • Insulin Pen Needle (BD PEN NEEDLE SWATI U/F) 32G X 4 MM Misc For insulin shots 5 times a day 450 Each 1   • ONE TOUCH ULTRA TEST strip USE TO TEST UP TO FID  2   • levoFLOXacin (LEVAQUIN) 500 MG tablet Take 1 Tab by mouth every day for 21 days. 21 Tab 0   • HUMALOG KWIKPEN 100 UNIT/ML Solution Pen-injector injection INJECT 35 UNITS SUBCUTANEOUSLY 3 TIMES DAILY BEFORE MEALS 30 mL 6   • losartan (COZAAR) 50 MG Tab TAKE 1 TABLET BY MOUTH EVERY DAY 90 Tab 3   • Probiotic Product (PROBIOTIC DAILY PO) Take  by mouth.     • clopidogrel (PLAVIX) 75 MG Tab TAKE 1 TABLET BY MOUTH EVERY DAY 90 Tab 1   • Dulaglutide (TRULICITY) 0.75 MG/0.5ML Solution Pen-injector Inject 0.75 mg as instructed every 7 days. 4 PEN 6   • Blood Glucose Monitoring Suppl Supplies Misc One Touch ultra glucometer strips for blood sugar check up to 5 times a day 450 Each 2   • atorvastatin (LIPITOR) 20 MG Tab Take 1 Tab by mouth every day. 90 Tab 2   • allopurinol (ZYLOPRIM) 300 MG Tab Take 150 mg by mouth every day.     • ascorbic acid (VITAMIN C) 500 MG tablet Take 500 mg by mouth every day.     • Cholecalciferol (VITAMIN D) 2000 units Cap Take 1 Cap by mouth every evening.     • Acetaminophen (TYLENOL PO) Take 1,250 mg by mouth 2 times a day as needed. 625 mg each tab     • fluoxetine (PROZAC) 40 MG capsule Take 1 Cap by mouth every day. 90 Cap 1   • Insulin Glargine (TOUJEO SOLOSTAR) 300  UNIT/ML Solution Pen-injector Inject 15 Units as instructed every evening. 3 PEN 6   • magnesium oxide (MAG-OX) 400 MG Tab Take 400 mg by mouth 3 times a day.     • fenofibrate (TRICOR) 145 MG Tab Take 1 Tab by mouth every day. 90 Tab 3   • spironolactone (ALDACTONE) 50 MG Tab Take 1 Tab by mouth every day. 90 Tab 3   • aspirin 81 MG tablet Take 81 mg by mouth every day.     • CENTRUM SILVER PO Take 1 Tab by mouth every day.     • [DISCONTINUED] metoprolol (TOPROL-XL) 200 MG XL tablet Take 1 Tab by mouth every day. 90 Tab 3   • NON SPECIFIED Apply 1 Application to affected area(s) 3 times a day as needed. Therawax       No facility-administered encounter medications on file as of 7/3/2018.      Review of Systems   Constitutional: Positive for malaise/fatigue. Negative for fever.   Respiratory: Negative for cough and shortness of breath.    Cardiovascular: Negative for chest pain, palpitations, orthopnea, claudication, leg swelling and PND.   Gastrointestinal: Negative for abdominal pain.   Musculoskeletal: Negative for myalgias.   Neurological: Positive for focal weakness and weakness.   All other systems reviewed and are negative.       Objective:   /64   Pulse 68   Ht 1.829 m (6')   Wt 90.7 kg (200 lb)   SpO2 97%   BMI 27.12 kg/m²      Physical Exam   Constitutional: He is oriented to person, place, and time. He appears well-developed and well-nourished. No distress.   HENT:   Head: Normocephalic and atraumatic.   Eyes: EOM are normal.   Neck: Normal range of motion. No JVD present.   Cardiovascular: Normal rate, regular rhythm, normal heart sounds and intact distal pulses.    No murmur heard.  Pulmonary/Chest: Effort normal and breath sounds normal. No respiratory distress. He has no wheezes. He has no rales.   Abdominal: Soft. Bowel sounds are normal.   Musculoskeletal:   Cane or wheelchair with weakness on L side S/P CVA   Neurological: He is alert and oriented to person, place, and time.   Skin:  Skin is warm and dry.   Psychiatric: He has a normal mood and affect.   Nursing note and vitals reviewed.      Assessment:     1. 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 Hyperlipidemia associated with type 2 diabetes mellitus ([facility])     3. Hypertension associated with diabetes ([facility])     4. Paroxysmal atrial fibrillation ([facility])     5. Coronary artery disease involving native coronary artery of native heart without angina pectoris     6. H/O Cerebrovascular accident (CVA) in adulthood     7. Essential hypertension  metoprolol (TOPROL-XL) 200 MG XL tablet     Medical Decision Making:  Today's Assessment / Status / Plan:     1. CAD with 5 stents (see previous cath report in '09), no angina, BOGGS chronic with debility. Cont toprol, ASA and plavix. Abnormal MPI previously but per ADD that day-no need for repeat angiogram. If angina recurs, repeat angiogram.    2. HTN, good control on bb, arb, aldactone. Follow at home. Move metoprolol to evening with fatigue during the day.    3. DM, managed with insulin. Followed by PCP.    4. PAF, not noted in last note per Dr. Patterson. Follow clinically. ASA, plavix, and toprol at this point. Discuss with Dr. Tucker at next follow up.    5. HLD, statin and fibrate. Yearly CMP and lipid to be done before next apt.    6. CVA, deficits on L side. Therapies continued. Continue ASA, plavix, statin.    FU in clinic in 3 months with JM as new patient    Patient verbalizes understanding and agrees with the plan of care.     Collaborating MD: Johanny GORDON    Physical Exam   Constitutional: He is oriented to person, place, and time. He appears well-developed and well-nourished. No distress.   HENT:   Head: Normocephalic and atraumatic.   Eyes: EOM are normal.   Neck: Normal range of motion. No JVD present.   Cardiovascular: Normal rate, regular rhythm,  normal heart sounds and intact distal pulses.    No murmur heard.  Pulmonary/Chest: Effort normal and breath sounds normal. No respiratory distress. He has no wheezes. He has no rales.   Abdominal: Soft. Bowel sounds are normal.   Musculoskeletal:   Cane or wheelchair with weakness on L side S/P CVA   Neurological: He is alert and oriented to person, place, and time.   Skin: Skin is warm and dry.   Psychiatric: He has a normal mood and affect.   Nursing note and vitals reviewed.          No Recipients

## 2018-07-18 ENCOUNTER — OFFICE VISIT (OUTPATIENT)
Dept: ENDOCRINOLOGY | Facility: MEDICAL CENTER | Age: 73
End: 2018-07-18
Payer: MEDICARE

## 2018-07-18 VITALS
HEIGHT: 72 IN | DIASTOLIC BLOOD PRESSURE: 70 MMHG | HEART RATE: 64 BPM | WEIGHT: 200 LBS | SYSTOLIC BLOOD PRESSURE: 120 MMHG | BODY MASS INDEX: 27.09 KG/M2 | OXYGEN SATURATION: 96 %

## 2018-07-18 DIAGNOSIS — Z79.4 TYPE 2 DIABETES MELLITUS WITH HYPERGLYCEMIA, WITH LONG-TERM CURRENT USE OF INSULIN (HCC): ICD-10-CM

## 2018-07-18 DIAGNOSIS — E11.65 TYPE 2 DIABETES MELLITUS WITH HYPERGLYCEMIA, WITH LONG-TERM CURRENT USE OF INSULIN (HCC): ICD-10-CM

## 2018-07-18 DIAGNOSIS — I10 ESSENTIAL HYPERTENSION: ICD-10-CM

## 2018-07-18 DIAGNOSIS — E78.2 MIXED HYPERLIPIDEMIA: ICD-10-CM

## 2018-07-18 LAB
HBA1C MFR BLD: 6.7 % (ref ?–5.8)
INT CON NEG: NEGATIVE
INT CON POS: POSITIVE

## 2018-07-18 PROCEDURE — 83036 HEMOGLOBIN GLYCOSYLATED A1C: CPT | Performed by: INTERNAL MEDICINE

## 2018-07-18 PROCEDURE — 99214 OFFICE O/P EST MOD 30 MIN: CPT | Performed by: INTERNAL MEDICINE

## 2018-07-18 NOTE — PROGRESS NOTES
Endocrinology Clinic Progress Note    CC: Diabetes    HPI:  Av Bob is a 72 y.o. old patient who comes in today for routine follow up.     Type 2 diabetes: He is currently on Toujeo 18 units at bedtime and Humalog 10 units with each meal plus mid dose correction factor, plus Trulicity 0.75 mg weekly. He checks blood sugars 3-4 times a day. Most blood sugar readings are in the range of . No hypoglycemia.    Hypertension: Blood pressure is well controlled. He reports compliance with medications. He is on ACE inhibitor.    Hyperlipidemia: He is currently on fenofibrate and Lipitor. Cholesterol levels are goal.    ROS:  Constitutional: No unintentional weight loss  Endo: Denies excessive thirst or frequent urination    PMH:  Type 2 diabetes  Hypertension  Hyperlipidemia    EXAM:  Vital signs: /70   Pulse 64   Ht 1.829 m (6')   Wt 90.7 kg (200 lb)   SpO2 96%   BMI 27.12 kg/m²   General: No apparent distress, cooperative  Eyes: No scleral icterus, no discharge  Neck: Normal on external inspection  Resp: Normal effort, clear to auscultation bilaterally  CVS: Regular rate and rhythm, normal S1-S2  Psych: Alert and oriented, normal mood and affect    Assessment and Plan:    1. Type 2 diabetes mellitus with hyperglycemia, with long-term current use of insulin (CMS-[facility])  · Hemoglobin A1c today in the clinic is 6.7%  · Goal hemoglobin A1c less than 7.5%  · Continue Toujeo to 18 units at bedtime, and continue Humalog 10 units 3 times a day with meals +2 additional units for every 50mg/dL BG above 150  · Increased Trulicity to 1.5 mg weekly, we can hopefully taper off Humalog    2. Mixed hyperlipidemia  · Continue Lipitor and fenofibrate    3. Essential hypertension  · Blood pressure is well controlled    4. Diabetic peripheral neuropathy    Return in about 3 months (around 10/18/2018).    Thank you for allowing me to participate in the care of this patient.    Trinidad Maguire M.D.    This note  was created using voice recognition software (Dragon). The accuracy of the dictation is limited by the abilities of the software. I have reviewed the note prior to signing, however some errors in grammar and context are still possible. If you have any questions related to this note please do not hesitate to contact our office.

## 2018-07-20 DIAGNOSIS — M10.9 CONTROLLED GOUT: ICD-10-CM

## 2018-07-20 RX ORDER — ALLOPURINOL 300 MG/1
TABLET ORAL
Qty: 90 TAB | Refills: 1 | Status: SHIPPED | OUTPATIENT
Start: 2018-07-20 | End: 2018-11-20

## 2018-07-24 ENCOUNTER — OFFICE VISIT (OUTPATIENT)
Dept: INFECTIOUS DISEASES | Facility: MEDICAL CENTER | Age: 73
End: 2018-07-24
Payer: MEDICARE

## 2018-07-24 VITALS
TEMPERATURE: 97.4 F | SYSTOLIC BLOOD PRESSURE: 130 MMHG | OXYGEN SATURATION: 97 % | DIASTOLIC BLOOD PRESSURE: 60 MMHG | HEART RATE: 67 BPM

## 2018-07-24 DIAGNOSIS — E11.3293 CONTROLLED TYPE 2 DIABETES MELLITUS WITH BOTH EYES AFFECTED BY MILD NONPROLIFERATIVE RETINOPATHY WITHOUT MACULAR EDEMA, WITHOUT LONG-TERM CURRENT USE OF INSULIN (HCC): ICD-10-CM

## 2018-07-24 DIAGNOSIS — N18.30 CKD (CHRONIC KIDNEY DISEASE) STAGE 3, GFR 30-59 ML/MIN (HCC): ICD-10-CM

## 2018-07-24 DIAGNOSIS — N39.0 RECURRENT UTI: ICD-10-CM

## 2018-07-24 PROCEDURE — 99213 OFFICE O/P EST LOW 20 MIN: CPT | Performed by: NURSE PRACTITIONER

## 2018-07-24 NOTE — PROGRESS NOTES
"[facility]    Subjective:     Chief Complaint   Patient presents with   • Follow-Up     Recurrent UTI     This is my first time meeting Mr. Bob.  Accompanied by his wife, Usha.    Interval History: 73 y.o. Male with a h/o DM, CVA with left hemiparesis, Nephrolithiasis -f/u with Urologist -, CKD-follows with Dr Jarvis, left renal obstructing kidney stone with worsening renal function-treated with lithotripsy and recurrent UTI different pathogens previously isolated including Kelbsiella, C glabrata, and Ecloacae.  Prior treatments only for a week at a time. Poorly tolerated abx in the past with diarrhea-helped somewhat by probiotics  Does not do scheduled voiding, poor fluid intake (so he can decrease frequency of urination).    5/1/18: Seen by Dr. Smith as a new pt. Different enteric pathogens-suppression not likely to be helpful, poorly tolerated, and increases risk for Cdiff. Preventative strategies discussed: increased fluid, scheduled voiding, sit or stand up to pee, control BS etc. Test urine only if symptomatic.  If does have UTI will need to be treated for sufficient duration-2 to 3 weeks; one week is too short.  IV abx if no oral option.    6/19/18:  Seen by Dr. Smith.  Had abnormal UA on 6/6-thought might have infection.  No culture done.  Urine culture ordered by me on 6/13-not done. Feels ok, just tired-no frequency, pain, or burning.  Wife states urine has \"strong odor.\"  Needs culture-will go get today-if ECloacae and still susceptible, plan for Levo for 2-3 weeks.  Otherwise per cx result.    6/25/18:  UA +E cloacae.  Started on PO Levo 500 mg x3 weeks per Dr. Smith.      Records reviewed    Today, 7/24/2018: Patient reports feeling well and tolerated the PO levofloxacin without adverse effect, which he completed last Monday.  Denies feeling generally ill, fevers/chills, general malaise, headache, n/v/d, abdominal pain, chest pain or shortness of breath.  Denies any side " "effects from the levofloxacin such as worsening neuropathy, altered mental state, tendon pain, heart palpitations or heart racing.  Denies any odor with the urine.  States that it is on the \"darker side\"; however, it is not cloudy.  Denies dysuria, flank pain, hematuria, urgency, hesitancy or urgency.  Has occasional bouts of incontinence, feels that this is slightly improved since being on the antibiotics.    ROS  As documented above in my HPI.    Past Medical History:   Diagnosis Date   • Acute respiratory failure with hypoxia ([facility]) 5/20/2017   • CAD (coronary artery disease)     GIOVANNA to RCA in '97, GIOVANNA X2 to LAD and GIOVANNA X2 to OM in '09   • Cancer ([facility])     2017; chemo lympoma   • Cataract    • Cerebrovascular accident (CVA) ([facility]) 12/30/2016    Left arm weakness  etiology of stroke not established, lymphoma discovered on MRI evaluation of stroke, L hemiparesis much worse after acute infectious illness in mid 2017, but no specific diagnosed recurrent neurological etiology, all at [facility]   • CKD (chronic kidney disease) stage 3, GFR 30-59 ml/min    • Controlled gout 2014   • Coronary atherosclerosis of native coronary artery     S/P PTCA (percutaneous transluminal coronary angioplasty), RCA, 5/1997, patent on cath 7/10/2009 at the time of interventions on his left anterior descending and circumflex coronary arteries   • Depression    • Diabetes ([facility])    • Enterococcal septicemia ([facility]) 8/12/2017   • Hypertension    • Hypokalemia 2012    controlled with combination of ACE inhibitor or ARB plus spironolactone   • Hypomagnesemia 08/12/2017    etiology uncertain   • 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 Barry   • NSTEMI (non-ST elevated myocardial infarction) ([facility]) 07/18/2017    complicating UTI with sepsis   • Pain    • Polyneuropathy in diabetes(357.2) 9/11/2013   • Septic shock ([facility]) " 5/20/2017   • Skin ulcer of calf ([facility]) 2015    Right, Dr. Terry and wound care   • Stroke ([facility]) 2016    left sided weakness   • Urinary bladder disorder    • Urinary incontinence    • Wound of left leg 2012    Requiring surgery and debridment, Dr. Moore       Social History   Substance Use Topics   • Smoking status: Never Smoker   • Smokeless tobacco: Never Used   • Alcohol use No       Allergies: Diphenhydramine hcl; Lorazepam; and Ciprofloxacin    Pt's medication and problem list reviewed.     Objective:     PE:  /60   Pulse 67   Temp 36.3 °C (97.4 °F)   SpO2 97%     Vital signs reviewed    Constitutional: Appears well-developed and well-nourished. No acute distress.    Eyes: Conjunctivae normal and EOM are normal. Pupils are equal, round, and reactive to light.   Neck: Trachea midline. Normal range of motion. No JVD.  Cardiovascular: Normal rate, regular rhythm, normal heart sounds. No murmur, gallop, or friction rub. No edema.  Respiratory: No respiratory distress, unlabored respiratory effort.  Lungs clear to auscultation bilaterally. No wheezes or rales.   Abdomen: Soft, non tender, non-distended. BS + x 4. No masses.   Musculoskeletal: Wheelchair.  Normal range of motion to all extremities.  No joint or bone tenderness, swelling, erythema or deformity.  No clubbing or cyanosis.  Neurologic: Speech fluent without dysarthria.  Left facial droop.  Skin: Warm and dry. Good turgor. No visible rashes or lesions.  Psychiatric: Alert and oriented to person, place, and time. Normal mood, calm affect.      Microbiology:  6/19/18  URINE CULTURE    Culture & Susceptibility     ENTEROBACTER CLOACAE     Antibiotic Sensitivity Microscan Unit Status   Ampicillin Resistant >16 mcg/mL Final   Cefepime Sensitive <=8 mcg/mL Final   Cefotaxime Sensitive <=2 mcg/mL Final   Cefotetan Resistant >32 mcg/mL Final   Ceftazidime Sensitive 4 mcg/mL Final   Ceftriaxone Sensitive <=8 mcg/mL Final   Cefuroxime Resistant >16  mcg/mL Final   Cephalothin Resistant >16 mcg/mL Final   Ciprofloxacin Resistant >2 mcg/mL Final   Gentamicin Sensitive <=4 mcg/mL Final   Levofloxacin Sensitive <=2 mcg/mL Final   Nitrofurantoin Intermediate 64 mcg/mL Final   Pip/Tazobactam Sensitive <=16 mcg/mL Final   Piperacillin Sensitive <=16 mcg/mL Final   Tigecycline Sensitive <=2 mcg/mL Final   Tobramycin Sensitive <=4 mcg/mL Final   Trimeth/Sulfa Resistant >2/38 mcg/mL Final        Assessment and Plan:   The following treatment plan was discussed with patient at length:    1. Recurrent UTI      -Completed levofloxacin, no need for additional antibiotics at this time.  Monitor for s/sx of worsening off abx: hesitancy, frequency, urgency, dysuria, hematuria, flank pain, fevers, chills, general malaise, etc.  Notify ID or go to ER should these s/sx occur.  -Discussed standing or sitting at the edge of the bed to void to ensure that the bladder is being emptied versus laying in bed to pee in urinal.  Drink plenty of fluids to avoid dehydration.   2. CKD (chronic kidney disease) stage 3, GFR 30-59 ml/min      Renally adjust antibiotics in the future as needed.  Avoid nephrotoxins.   3. 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  Continue to monitor blood sugars closely as DM will impair wound healing and can worsen infection.     Follow up: PRN, RTC sooner if needed. FU with PCP for ongoing chronic medical conditions.     ALYSSA Santiago.       Please note that this dictation was created using voice recognition software. I have  worked with technical experts from [employer] to optimize the interface.  I have made every reasonable attempt to correct obvious errors, but there may be errors of grammar and possibly content that I did not discover before finalizing the note.

## 2018-07-26 RX ORDER — FLUOXETINE HYDROCHLORIDE 40 MG/1
CAPSULE ORAL
Qty: 90 CAP | Refills: 3 | Status: SHIPPED | OUTPATIENT
Start: 2018-07-26 | End: 2019-06-08 | Stop reason: SDUPTHER

## 2018-08-10 ENCOUNTER — PHYSICAL THERAPY (OUTPATIENT)
Dept: PHYSICAL THERAPY | Facility: REHABILITATION | Age: 73
End: 2018-08-10
Attending: FAMILY MEDICINE
Payer: MEDICARE

## 2018-08-10 DIAGNOSIS — G81.94 LEFT HEMIPARESIS (HCC): ICD-10-CM

## 2018-08-10 DIAGNOSIS — Z99.3 WHEELCHAIR DEPENDENCE: ICD-10-CM

## 2018-08-10 PROCEDURE — 97162 PT EVAL MOD COMPLEX 30 MIN: CPT | Mod: KX

## 2018-08-10 PROCEDURE — G8979 MOBILITY GOAL STATUS: HCPCS | Mod: CJ,KX

## 2018-08-10 PROCEDURE — G8978 MOBILITY CURRENT STATUS: HCPCS | Mod: CK,KX

## 2018-08-10 ASSESSMENT — ACTIVITIES OF DAILY LIVING (ADL): POOR_BALANCE: 1

## 2018-08-10 ASSESSMENT — BALANCE ASSESSMENTS: BALANCE - SITTING STATIC: GOOD

## 2018-08-10 NOTE — OP THERAPY EVALUATION
Outpatient Physical Therapy  INITIAL NEUROLOGICAL EVALUATION    [facility] [place] [address]  [address]  [place] [address]  Phone:  [phone]  Fax:  [phone]    Date of Evaluation: 08/10/2018    Patient: Av Bob  YOB: 1945  MRN: [id number]     Referring Provider: Mitchell Pantoja M.D.  [address]  [address]  [place] [address]   Referring Diagnosis Left hemiparesis (HCC) [G81.94];Wheelchair dependent [Z99.3]     Time Calculation  Start time: 1030  Stop time: 1130 Time Calculation (min): 60 minutes     Date of onset of Impairment: 18  Date PT Care Plan Established or Reviewed: 08/10/18  Date PT treatment started: 08/10/18      Chief Complaint: No chief complaint on file.    Visit Diagnoses     ICD-10-CM   1. Left hemiparesis (HCC) G81.94   2. Wheelchair dependence Z99.3       Subjective  Patient is a 73 year old male returning to PT after an episode from 2017 thru 2018. Patient reports during the break from PT, he had a kidney stone removed and 2 falls in the kitchen. Patient reports he is now up 6-8 hours, primarily in the W/C. Patient does walk with the platform FWW and L AFO everyday. He is walking around his kitchen island a few times a week.     Previous history:   S/P CVA 12/31/15, DX with non hodgkins lymphoma with subsequent chemo. Prolonged immobilization in bed 2-3 months after norovirus/ICU delerium.  Went to skilled nursing 2017 and acute rehab worked on functional mobility. Multiple hospitalizations for kidney infection and kidney stones with surgery.Lack of initiation and [place] with functional mobility at home per patients wife.  Hx positive for depression.    Pain:     Current pain ratin    At best pain ratin    At worst pain ratin  Social Support:     Lives in:  One-story house    Lives with:  Spouse  Activities of Daily Living:     Patient reported ADL status: Patient reports that he is  supervised/ Lesia with transfers   Dressing: modA   Showering: modA/maxA   Walking: Lesia approximately > 25 ft increments with platform FWW    Patient Goals:     Other patient goals:  To increase gait     Past Medical History:   Diagnosis Date   • Acute respiratory failure with hypoxia ([facility]) 5/20/2017   • CAD (coronary artery disease)     GIOVANNA to RCA in '97, GIOVANNA X2 to LAD and GIOVANNA X2 to OM in '09   • Cancer ([facility])     2017; chemo lympoma   • Cataract    • Cerebrovascular accident (CVA) ([facility]) 12/30/2016    Left arm weakness  etiology of stroke not established, lymphoma discovered on MRI evaluation of stroke, L hemiparesis much worse after acute infectious illness in mid 2017, but no specific diagnosed recurrent neurological etiology, all at [facility]   • CKD (chronic kidney disease) stage 3, GFR 30-59 ml/min    • Controlled gout 2014   • Coronary atherosclerosis of native coronary artery     S/P PTCA (percutaneous transluminal coronary angioplasty), RCA, 5/1997, patent on cath 7/10/2009 at the time of interventions on his left anterior descending and circumflex coronary arteries   • Depression    • Diabetes ([facility])    • Enterococcal septicemia ([facility]) 8/12/2017   • Hypertension    • Hypokalemia 2012    controlled with combination of ACE inhibitor or ARB plus spironolactone   • Hypomagnesemia 08/12/2017    etiology uncertain   • 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 Barry   • NSTEMI (non-ST elevated myocardial infarction) ([facility]) 07/18/2017    complicating UTI with sepsis   • Pain    • Polyneuropathy in diabetes(357.2) 9/11/2013   • Septic shock ([facility]) 5/20/2017   • Skin ulcer of calf ([facility]) 2015    Right, Dr. Terry and wound care   • Stroke ([facility]) 2016    left sided weakness   • Urinary bladder disorder    • Urinary incontinence    • Wound of left leg 2012    Requiring surgery and debridment,   Oscar     Past Surgical History:   Procedure Laterality Date   • CYSTOSCOPY STENT PLACEMENT Left 2/12/2018    Procedure: CYSTOSCOPY  ;  Surgeon: Rey Barry M.D.;  Location: SURGERY [facility];  Service: Urology   • URETEROSCOPY Left 2/12/2018    Procedure: URETEROSCOPY- FLEXIBLE  ;  Surgeon: Rey Barry M.D.;  Location: SURGERY [facility];  Service: Urology   • LASERTRIPSY Left 2/12/2018    Procedure: LASERTRIPSY - LITHO  ;  Surgeon: Rey Barry M.D.;  Location: SURGERY [facility];  Service: Urology   • WOUND CLOSURE GENERAL  4/3/2012    Performed by GERHARD GILBERT at SURGERY SAME DAY [facility] ORS   • CARDIAC CATH  2009    Stents to LAD, Om   • DAGOBERTO BY LAPAROSCOPY  1998   • ANGIOPLASTY  1997    RCA followed by other stents as noted above.    • CARDIAC CATH  1997    stent RCA   • CATARACT EXTRACTION WITH IOL      bilateral   • LITHOTRIPSY     • TONSILLECTOMY AND ADENOIDECTOMY       Social History   Substance Use Topics   • Smoking status: Never Smoker   • Smokeless tobacco: Never Used   • Alcohol use No     Family and Occupational History     Social History   • Marital status:      Spouse name: N/A   • Number of children: N/A   • Years of education: N/A       Objective:   Active Range of Motion:   Upper extremity (left):     AROM Left Shoulder Flexion: 65 degrees, patient reports he is now able to touch he nose and forehead     Shoulder extension: Within functional limits    AROM Left Shoulder Abduction: 55 degrees.    AROM Left Shoulder External Rotation: neutral     Elbow flexion: Within functional limits    AROM Left Elbow Extension: -20 degrees.    Forearm pronation: Within functional limits    Forearm supination: Below functional limits  Upper extremity (right):     Shoulder flexion: Within functional limits    Shoulder extension: Within functional limits    Elbow flexion: Within functional limits    Elbow extension: Within functional limits    Forearm pronation: Within  functional limits    Forearm supination: Within functional limits      Passive Range of Motion:   Lower extremity (left):     Ankle dorsiflexion: Within functional limits (12 degrees)    Tone, Sensation and Coordination:   Tone:     Left upper extremity muscle tone: Fluctuating    Left lower extremity muscle tone: Hypertonic and Fluctuating    Modified Deuce:   Lower extremity (left):     Knee extensors: 3    Sensation   Lower extremity (left):     Light touch: Intact    Postural Control (Balance)     Sitting (static): Good    Observational Gait   Gait: asymmetric     Walking speed and stride length below functional limits.    Stride width: narrow.    Left foot contact pattern: foot flat  Left arm swing: decreased  Base of support: narrow    Additional Observational Gait Details  Use of platform FWW, max verbal cueing for increased hip flexed position, patient can initiate but unable to sustain upright posture.     With use of SBQC: modA with narrow ALLEN, decreased WS to L side     Balance/Gait Comments   6 minute walk test: 190 ft         Therapeutic Exercises (CPT 16706):     1. Prone knee extension     2. Prone knee flexion     3. Standing balance with gait at counter       Time-based treatments/modalities:          Assessment, Response and Plan:   Impairments: abnormal gait, impaired functional mobility and impaired balance    Assessment details:  Patient is a 73 year old male who present with L sided weakness, impaired balance, and impaired gait limiting functional mobility. At this time patient has previously been seen for multiple PT visits this year and made minimal change from previous episode. Recommending 1-2 additional sessions for HEP prior to d/c.   Barriers to therapy:  None  Goals:   Short Term Goals:   1) Patient will be able to demonstrate > 5% improvement with 6 minute walk test     Short term goal time span:  1-2 weeks      Long Term Goals:    1) Patient will be independent with HEP   2)Patient  will be able to demonstrate > 10% improvement with 6 minute walk test   Long term goal time span:  2-4 weeks    Plan:   Therapy options:  Physical therapy treatment to continue  Planned therapy interventions:  Neuromuscular Re-education (CPT 79329) and Therapeutic Exercise (CPT 09072)  Frequency:  2x month  Duration in weeks:  4  Duration in visits:  2  Discussed with:  Patient      Functional Limitation G-Codes and Severity Modifiers  PT Functional Assessment Tool Used: 6 minute walk test  PT Functional Assessment Score: 190 ft    Current: Mobility: Current (): CK   Goal: Mobility: Goal (): CJ       Referring provider co-signature:  I have reviewed this plan of care and my co-signature certifies the need for services.  Certification Dates:   From 8/10/2018       To 09/10/18    Physician Signature: ________________________________ Date: ______________

## 2018-08-14 ENCOUNTER — APPOINTMENT (OUTPATIENT)
Dept: PHYSICAL THERAPY | Facility: REHABILITATION | Age: 73
End: 2018-08-14
Attending: FAMILY MEDICINE
Payer: MEDICARE

## 2018-08-15 ENCOUNTER — HOSPITAL ENCOUNTER (OUTPATIENT)
Dept: LAB | Facility: MEDICAL CENTER | Age: 73
End: 2018-08-15
Attending: INTERNAL MEDICINE
Payer: MEDICARE

## 2018-08-15 DIAGNOSIS — N39.0 RECURRENT UTI: ICD-10-CM

## 2018-08-15 DIAGNOSIS — N18.30 CKD (CHRONIC KIDNEY DISEASE) STAGE 3, GFR 30-59 ML/MIN (HCC): ICD-10-CM

## 2018-08-15 LAB
ANION GAP SERPL CALC-SCNC: 10 MMOL/L (ref 0–11.9)
APPEARANCE UR: ABNORMAL
BACTERIA #/AREA URNS HPF: ABNORMAL /HPF
BILIRUB UR QL STRIP.AUTO: NEGATIVE
BUN SERPL-MCNC: 31 MG/DL (ref 8–22)
CALCIUM SERPL-MCNC: 9.4 MG/DL (ref 8.5–10.5)
CHLORIDE SERPL-SCNC: 105 MMOL/L (ref 96–112)
CO2 SERPL-SCNC: 24 MMOL/L (ref 20–33)
COLOR UR: YELLOW
CREAT SERPL-MCNC: 1.98 MG/DL (ref 0.5–1.4)
EPI CELLS #/AREA URNS HPF: NEGATIVE /HPF
GLUCOSE SERPL-MCNC: 134 MG/DL (ref 65–99)
GLUCOSE UR STRIP.AUTO-MCNC: NEGATIVE MG/DL
HYALINE CASTS #/AREA URNS LPF: ABNORMAL /LPF
KETONES UR STRIP.AUTO-MCNC: NEGATIVE MG/DL
LEUKOCYTE ESTERASE UR QL STRIP.AUTO: ABNORMAL
MICRO URNS: ABNORMAL
NITRITE UR QL STRIP.AUTO: POSITIVE
PH UR STRIP.AUTO: 6.5 [PH]
POTASSIUM SERPL-SCNC: 3.8 MMOL/L (ref 3.6–5.5)
PROT UR QL STRIP: NEGATIVE MG/DL
RBC # URNS HPF: ABNORMAL /HPF
RBC UR QL AUTO: ABNORMAL
SODIUM SERPL-SCNC: 139 MMOL/L (ref 135–145)
SP GR UR STRIP.AUTO: 1.01
UROBILINOGEN UR STRIP.AUTO-MCNC: 0.2 MG/DL
WBC #/AREA URNS HPF: ABNORMAL /HPF

## 2018-08-15 PROCEDURE — 36415 COLL VENOUS BLD VENIPUNCTURE: CPT

## 2018-08-15 PROCEDURE — 81001 URINALYSIS AUTO W/SCOPE: CPT

## 2018-08-15 PROCEDURE — 80048 BASIC METABOLIC PNL TOTAL CA: CPT

## 2018-08-21 ENCOUNTER — PHYSICAL THERAPY (OUTPATIENT)
Dept: PHYSICAL THERAPY | Facility: REHABILITATION | Age: 73
End: 2018-08-21
Attending: FAMILY MEDICINE
Payer: MEDICARE

## 2018-08-21 DIAGNOSIS — G81.94 LEFT HEMIPARESIS (HCC): ICD-10-CM

## 2018-08-21 DIAGNOSIS — Z99.3 WHEELCHAIR DEPENDENCE: ICD-10-CM

## 2018-08-21 DIAGNOSIS — R53.81 DEBILITY: ICD-10-CM

## 2018-08-21 PROCEDURE — G8980 MOBILITY D/C STATUS: HCPCS | Mod: CK,KX

## 2018-08-21 PROCEDURE — 97116 GAIT TRAINING THERAPY: CPT | Mod: KX

## 2018-08-21 PROCEDURE — G8979 MOBILITY GOAL STATUS: HCPCS | Mod: CJ,KX

## 2018-08-21 PROCEDURE — G8978 MOBILITY CURRENT STATUS: HCPCS | Mod: CK,KX

## 2018-08-21 PROCEDURE — 97112 NEUROMUSCULAR REEDUCATION: CPT | Mod: KX

## 2018-08-21 NOTE — OP THERAPY DAILY TREATMENT
Outpatient Physical Therapy  DAILY TREATMENT     [facility] [address]  [address]  [place] [address]  Phone:  [phone]  Fax:  [phone]    Date: 08/21/2018    Patient: Av Bob  YOB: 1945  MRN: [id number]     Time Calculation  Start time: 1035  Stop time: 1135 Time Calculation (min): 60 minutes     Chief Complaint: Impaired mobility   Visit #: 2    SUBJECTIVE:  Patient reports that he has intermittently been practicing exercises at home     OBJECTIVE:  Current objective measures:   6 minute walk test: 155 ft   PT Functional Assessment Tool Used: 6 minute walk test   PT Functional Assessment Score: 155 ft        Therapeutic Treatments and Modalities:     1. Gait Training (CPT 36874), 6 minute walk test 150 ft with platform FWW, cueing for upright posture, supervision , Pre-gait at raised mat fwd/ bwd 8x with no shoes on cueing for increasing ALLEN and WS to L with increased step length posterior , Pre-gait L LE in stance, R LE step backward x 15     4. Neuromuscular Re-education (CPT 95838), sit to stand x 5 cueing for anterior WS , Standing at raised surface, R shoulder flexion to decrease ALLEN 2 x 10, lateral x 15, close supervision    Therapeutic Treatment and Modalities Summary: Time spent discussing exercises and safety for patient at home with spouse, Usha and Av     Time-based treatments/modalities:  Gait training minutes (CPT 99612): 30 minutes  Neuromusc re-ed, balance, coor, post minutes (CPT 95513): 30 minutes       ASSESSMENT:   Response to treatment: Patient unfortunately did not improve on 6 minute walk test. Due to previous increased amount of visits this year and decreased progress, recommend d/c at this time. Discussed with patient and wife community options to maintain activity and increase movement. Patient will benefit from re-assessment in January and establish new POC for 2019.     PLAN/RECOMMENDATIONS:   Plan for treatment:  d/c due to previous visits and plateau at this time .

## 2018-08-21 NOTE — OP THERAPY DISCHARGE SUMMARY
Outpatient Physical Therapy  DISCHARGE SUMMARY NOTE      [facility] Therapy [address]  [address]  [place] [address]  Phone:  [phone]  Fax:  [phone]    Date of Visit: 08/21/2018    Patient: Av Bob  YOB: 1945  MRN: [id number]     Referring Provider: Mitchell Pantoja M.D.  [address]  [address]  [place] [address]   Referring Diagnosis Ulcer of ankle (HCC) [L97.309]     Physical Therapy Occurrence Codes    Date of onset of impairment:  6/26/18   Date physical therapy care plan established or reviewed:  8/10/18   Date physical therapy treatment started:  8/10/18          Functional Limitation G-Codes and Severity Modifiers  PT Functional Assessment Tool Used: 6 minute walk test   PT Functional Assessment Score: 155 ft    Goal: Mobility: Goal (): CJ   Discharge: Mobility: Discharge (): CK       Your patient is being discharged from Physical Therapy with the following comments:   · Patient was previously seen for 14 visits this year and unfortunately has not demonstrated improvement with mobility at this time     Comments:  Patient was seen for evaluation and one additional visit from 08/10/2018 to 8/21/2018. Patient continues to demonstrate impaired gait, impaired balance, and decreased functional independence. Due to current insurance and previous sessions this year, patient is not demonstrating significant progress to continue PT at this time.        Limitations Remaining:  See above     Recommendations:  Patient to seek community resources and recommend re-evaluation in January for PT services.     Debby Giles, PT, DPT    Date: 8/21/2018

## 2018-08-22 ENCOUNTER — OFFICE VISIT (OUTPATIENT)
Dept: NEPHROLOGY | Facility: MEDICAL CENTER | Age: 73
End: 2018-08-22
Payer: MEDICARE

## 2018-08-22 ENCOUNTER — PATIENT MESSAGE (OUTPATIENT)
Dept: INFECTIOUS DISEASES | Facility: MEDICAL CENTER | Age: 73
End: 2018-08-22

## 2018-08-22 VITALS
WEIGHT: 200 LBS | HEART RATE: 76 BPM | OXYGEN SATURATION: 99 % | RESPIRATION RATE: 16 BRPM | SYSTOLIC BLOOD PRESSURE: 130 MMHG | TEMPERATURE: 97.4 F | DIASTOLIC BLOOD PRESSURE: 70 MMHG | HEIGHT: 72 IN | BODY MASS INDEX: 27.09 KG/M2

## 2018-08-22 DIAGNOSIS — N39.0 RECURRENT UTI: ICD-10-CM

## 2018-08-22 DIAGNOSIS — E11.29 MICROALBUMINURIA DUE TO TYPE 2 DIABETES MELLITUS (HCC): ICD-10-CM

## 2018-08-22 DIAGNOSIS — R80.9 MICROALBUMINURIA DUE TO TYPE 2 DIABETES MELLITUS (HCC): ICD-10-CM

## 2018-08-22 DIAGNOSIS — N18.30 CKD (CHRONIC KIDNEY DISEASE) STAGE 3, GFR 30-59 ML/MIN (HCC): ICD-10-CM

## 2018-08-22 DIAGNOSIS — E55.9 VITAMIN D DEFICIENCY: ICD-10-CM

## 2018-08-22 DIAGNOSIS — I10 ESSENTIAL HYPERTENSION: ICD-10-CM

## 2018-08-22 PROCEDURE — 99214 OFFICE O/P EST MOD 30 MIN: CPT | Performed by: INTERNAL MEDICINE

## 2018-08-22 ASSESSMENT — ENCOUNTER SYMPTOMS
WEIGHT LOSS: 0
DIARRHEA: 0
ABDOMINAL PAIN: 0
BACK PAIN: 0
SHORTNESS OF BREATH: 0
COUGH: 0
WHEEZING: 0
HEARTBURN: 0
EYES NEGATIVE: 1
FEVER: 0
MYALGIAS: 0
NECK PAIN: 0
ORTHOPNEA: 0
VOMITING: 0
HEMOPTYSIS: 0
NAUSEA: 0
PALPITATIONS: 0
CHILLS: 0
FLANK PAIN: 0

## 2018-08-22 NOTE — PROGRESS NOTES
Subjective:      Av Bob is a 73 y.o. male who presents with Follow-Up and Chronic Kidney Disease            Chronic Kidney Disease   Pertinent negatives include no abdominal pain, chest pain, chills, coughing, fever, myalgias, nausea, neck pain or vomiting.     Av is coming today for f/u of CKD III, microalbuminuria  Has long term hx/of Nephrolithiasis -f/u with Urologist -  Baseline creatinine level at 1.2's - worse to 1.8! -now stays at 1.9's  Diagnosed with left renal obstructing kidney stone -treated with lithotripsy  (+) for recurrent UTI - completed 3 weeks abx course  C/o fatigue, low energy level  No difficulties to urinate.  No dysuria  No flank pain  HTN: BP well controlled    Review of Systems   Constitutional: Positive for malaise/fatigue. Negative for chills, fever and weight loss.   HENT: Negative.    Eyes: Negative.    Respiratory: Negative for cough, hemoptysis, shortness of breath and wheezing.    Cardiovascular: Negative for chest pain, palpitations, orthopnea and leg swelling.   Gastrointestinal: Negative for abdominal pain, diarrhea, heartburn, nausea and vomiting.   Genitourinary: Negative for dysuria, flank pain, frequency, hematuria and urgency.   Musculoskeletal: Negative for back pain, myalgias and neck pain.   Skin: Negative.    Neurological:        Hx/of CVA, on wheel chair   All other systems reviewed and are negative.         Objective:     /70   Pulse 76   Temp 36.3 °C (97.4 °F)   Resp 16   Ht 1.829 m (6')   Wt 90.7 kg (200 lb)   SpO2 99%   BMI 27.12 kg/m²      Physical Exam   Constitutional: He is oriented to person, place, and time. He appears well-developed and well-nourished. No distress.   HENT:   Head: Normocephalic and atraumatic.   Nose: Nose normal.   Mouth/Throat: Oropharynx is clear and moist.   Eyes: Conjunctivae are normal. Pupils are equal, round, and reactive to light.   Neck: Normal range of motion. Neck supple.   Cardiovascular:  Normal rate and regular rhythm.  Exam reveals no gallop and no friction rub.    Pulmonary/Chest: Effort normal and breath sounds normal. No respiratory distress. He has no wheezes. He has no rales.   Abdominal: Soft. Bowel sounds are normal. He exhibits no distension. There is no tenderness.   Musculoskeletal: He exhibits no edema.   Neurological: He is alert and oriented to person, place, and time. Left sided weakness  Nursing note and vitals reviewed.            Laboratory results reviewed:  Lab Results   Component Value Date/Time    CREATININE 1.98 (H) 08/15/2018 10:09 AM    CREATININE 1.4 05/28/2008 05:42 PM    POTASSIUM 3.8 08/15/2018 10:09 AM       Assessment/Plan:     1. CKD (chronic kidney disease), stage III      Creat at 1.9's -likely baseline -to monitor closely      2. Essential hypertension, benign      Well controlled    3. Microalbuminuria due to type 2 diabetes mellitus (CMS-HCC)      Microalb/creat ratio improved-on ARB -to monitor      4. Vitamin D deficiency      Vit D and PTH WNL -continue supplementation      5. Nephrolithiasis      s/p lithotripsy    6.UTI - recurrent -completed 3 weeks course of abx     F/u with ID       Recs;  f/u in ID clinic             To drink plenty of fluids              Avoid NSAID's              Monitor BP              F/u in 3 months with BMP                                                                      Physical Exam

## 2018-08-27 ENCOUNTER — DOCUMENTATION (OUTPATIENT)
Dept: INFECTIOUS DISEASES | Facility: MEDICAL CENTER | Age: 73
End: 2018-08-27

## 2018-08-27 DIAGNOSIS — N39.0 RECURRENT UTI: ICD-10-CM

## 2018-08-28 ENCOUNTER — APPOINTMENT (OUTPATIENT)
Dept: PHYSICAL THERAPY | Facility: REHABILITATION | Age: 73
End: 2018-08-28
Attending: FAMILY MEDICINE
Payer: MEDICARE

## 2018-08-28 ENCOUNTER — HOSPITAL ENCOUNTER (OUTPATIENT)
Facility: MEDICAL CENTER | Age: 73
End: 2018-08-28
Attending: NURSE PRACTITIONER
Payer: MEDICARE

## 2018-08-28 DIAGNOSIS — N39.0 RECURRENT UTI: ICD-10-CM

## 2018-08-28 LAB
APPEARANCE UR: ABNORMAL
BACTERIA #/AREA URNS HPF: ABNORMAL /HPF
BILIRUB UR QL STRIP.AUTO: NEGATIVE
COLOR UR: YELLOW
EPI CELLS #/AREA URNS HPF: NEGATIVE /HPF
GLUCOSE UR STRIP.AUTO-MCNC: NEGATIVE MG/DL
HYALINE CASTS #/AREA URNS LPF: ABNORMAL /LPF
KETONES UR STRIP.AUTO-MCNC: NEGATIVE MG/DL
LEUKOCYTE ESTERASE UR QL STRIP.AUTO: ABNORMAL
MICRO URNS: ABNORMAL
NITRITE UR QL STRIP.AUTO: POSITIVE
PH UR STRIP.AUTO: 6 [PH]
PROT UR QL STRIP: NEGATIVE MG/DL
RBC # URNS HPF: ABNORMAL /HPF
RBC UR QL AUTO: ABNORMAL
SP GR UR STRIP.AUTO: 1.01
UROBILINOGEN UR STRIP.AUTO-MCNC: 0.2 MG/DL
WBC #/AREA URNS HPF: ABNORMAL /HPF

## 2018-08-28 PROCEDURE — 87086 URINE CULTURE/COLONY COUNT: CPT

## 2018-08-28 PROCEDURE — 87077 CULTURE AEROBIC IDENTIFY: CPT

## 2018-08-28 PROCEDURE — 87186 SC STD MICRODIL/AGAR DIL: CPT

## 2018-08-28 PROCEDURE — 81001 URINALYSIS AUTO W/SCOPE: CPT

## 2018-08-30 ENCOUNTER — TELEPHONE (OUTPATIENT)
Dept: INFECTIOUS DISEASES | Facility: MEDICAL CENTER | Age: 73
End: 2018-08-30

## 2018-08-30 DIAGNOSIS — A49.8 INFECTION DUE TO ENTEROBACTER SPECIES: ICD-10-CM

## 2018-08-30 DIAGNOSIS — N39.0 URINARY TRACT INFECTION WITHOUT HEMATURIA, SITE UNSPECIFIED: ICD-10-CM

## 2018-08-30 RX ORDER — SULFAMETHOXAZOLE AND TRIMETHOPRIM 800; 160 MG/1; MG/1
1 TABLET ORAL 2 TIMES DAILY
Qty: 28 TAB | Refills: 0 | Status: SHIPPED | OUTPATIENT
Start: 2018-08-30 | End: 2018-09-13

## 2018-08-30 NOTE — TELEPHONE ENCOUNTER
Called and spoke with pt and his wife. Pt has had Bactrim before and did ok. Pt scheduled for 9/11/18 at 2 pm. All information/instructions were given. Both verbally understood and will comply with all given. SARBJIT

## 2018-08-30 NOTE — PROGRESS NOTES
UA +Enterobacter hormaechei.  Resistant to Cipro and Levo.  Sensitive to Bactrim.  Appears pt has had Bactrim in the past, will treat with 14 day course.

## 2018-08-30 NOTE — TELEPHONE ENCOUNTER
----- Message from CHANELLE Santiago sent at 8/30/2018  4:14 PM PDT -----  Please call and let him know UA +Enterobacter hormaechei.  Resistant to Cipro and Levo.  Sensitive to Bactrim.  Appears he has had Bactrim in the past (please confirm), will treat with 14 day course. Schedule for FU next week.

## 2018-08-31 ENCOUNTER — TELEPHONE (OUTPATIENT)
Dept: MEDICAL GROUP | Facility: MEDICAL CENTER | Age: 73
End: 2018-08-31

## 2018-08-31 NOTE — TELEPHONE ENCOUNTER
"· Progress Notes paperwork received from [employer] requiring provider signature.     · All appropriate fields completed by Medical Assistant: No    · Paperwork placed in \"MA to Provider\" folder/basket. Awaiting provider completion/signature.    "

## 2018-09-06 ENCOUNTER — TELEPHONE (OUTPATIENT)
Dept: INFECTIOUS DISEASES | Facility: MEDICAL CENTER | Age: 73
End: 2018-09-06

## 2018-09-06 NOTE — TELEPHONE ENCOUNTER
Pt's wife is calling and stating that the Bactrim is causing her  to have fatigue and an upset stomach. He is still able to eat and has no nausea. He has no diarrhea at this time. He has an appointment with ID on 9/11/18. SARBJIT

## 2018-09-10 DIAGNOSIS — E11.628 DIABETES WITH SKIN COMPLICATION (HCC): ICD-10-CM

## 2018-09-10 DIAGNOSIS — Z79.4 TYPE 2 DIABETES MELLITUS WITH HYPERGLYCEMIA, WITH LONG-TERM CURRENT USE OF INSULIN (HCC): ICD-10-CM

## 2018-09-10 DIAGNOSIS — E11.65 TYPE 2 DIABETES MELLITUS WITH HYPERGLYCEMIA, WITH LONG-TERM CURRENT USE OF INSULIN (HCC): ICD-10-CM

## 2018-09-11 ENCOUNTER — OFFICE VISIT (OUTPATIENT)
Dept: INFECTIOUS DISEASES | Facility: MEDICAL CENTER | Age: 73
End: 2018-09-11
Payer: MEDICARE

## 2018-09-11 VITALS
WEIGHT: 200 LBS | HEIGHT: 72 IN | SYSTOLIC BLOOD PRESSURE: 120 MMHG | OXYGEN SATURATION: 97 % | DIASTOLIC BLOOD PRESSURE: 68 MMHG | TEMPERATURE: 96.4 F | HEART RATE: 78 BPM | BODY MASS INDEX: 27.09 KG/M2

## 2018-09-11 DIAGNOSIS — A49.8 INFECTION DUE TO ENTEROBACTER SPECIES: ICD-10-CM

## 2018-09-11 DIAGNOSIS — N39.0 RECURRENT UTI: ICD-10-CM

## 2018-09-11 DIAGNOSIS — N18.30 CKD (CHRONIC KIDNEY DISEASE) STAGE 3, GFR 30-59 ML/MIN (HCC): ICD-10-CM

## 2018-09-11 DIAGNOSIS — E11.3293 CONTROLLED TYPE 2 DIABETES MELLITUS WITH BOTH EYES AFFECTED BY MILD NONPROLIFERATIVE RETINOPATHY WITHOUT MACULAR EDEMA, WITHOUT LONG-TERM CURRENT USE OF INSULIN (HCC): ICD-10-CM

## 2018-09-11 PROCEDURE — 99213 OFFICE O/P EST LOW 20 MIN: CPT | Performed by: NURSE PRACTITIONER

## 2018-09-11 NOTE — PROGRESS NOTES
"[facility]    Subjective:     Chief Complaint   Patient presents with   • Follow-Up     Recurrent UTI     Interval History: 73 y.o. Male with a h/o DM, CVA with left hemiparesis, Nephrolithiasis -f/u with Urologist -, CKD-follows with Dr Jarvis, left renal obstructing kidney stone with worsening renal function-treated with lithotripsy and recurrent UTI different pathogens previously isolated including Kelbsiella, C glabrata, and Ecloacae.  Prior treatments only for a week at a time. Poorly tolerated abx in the past with diarrhea-helped somewhat by probiotics  Does not do scheduled voiding, poor fluid intake (so he can decrease frequency of urination).    5/1/18: Seen by Dr. Smith as a new pt. Different enteric pathogens-suppression not likely to be helpful, poorly tolerated, and increases risk for Cdiff. Preventative strategies discussed: increased fluid, scheduled voiding, sit or stand up to pee, control BS etc. Test urine only if symptomatic.  If does have UTI will need to be treated for sufficient duration-2 to 3 weeks; one week is too short.  IV abx if no oral option.    6/19/18:  Seen by Dr. Smith.  Had abnormal UA on 6/6-thought might have infection.  No culture done.  Urine culture ordered by me on 6/13-not done. Feels ok, just tired-no frequency, pain, or burning.  Wife states urine has \"strong odor.\"  Needs culture-will go get today-if ECloacae and still susceptible, plan for Levo for 2-3 weeks.  Otherwise per cx result.    6/25/18:  UA +E cloacae.  Started on PO Levo 500 mg x3 weeks per Dr. Smith.      7/24/2018: Seen by ARNEL Zelaya.   Denies any odor with the urine.  States that it is on the \"darker side\"; however, it is not cloudy.  Has occasional bouts of incontinence, feels that this is slightly improved since being on the antibiotics.  Completed levofloxacin, no need for additional antibiotics at this time.     8/30/18: UA +Enterobacter hormaechei.  Resistant to Cipro " and Levo.  Sensitive to Bactrim.  Appears pt has had Bactrim in the past, will treat with 14 day course.    Today, 9/11/2018: Tolerating the p.o. Bactrim with minimal issue.  Has had an increase in fatigue, in addition to having a decreased appetite.  States that the odor to the urine resolved within approximately 2 days of starting the Bactrim.  Denies any urinary symptoms: hesitancy, frequency, urgency, dysuria, hematuria, flank pain, etc; however, states that the symptoms have never been present in previous times of having a UTI.  Patient states that he feels the same hours prior to starting the antibiotics.  Continues to have incontinence, this has not changed for quite some time.  Denies feeling generally ill, fevers/chills, general malaise, headache, n/v/d, abdominal pain, chest pain or shortness of breath.  Wife and patient plan to return to Dr. Barry, with urology, in the near future.    ROS  As documented above in my HPI.    Past Medical History:   Diagnosis Date   • Acute respiratory failure with hypoxia ([facility]) 5/20/2017   • CAD (coronary artery disease)     GIOVANNA to RCA in '97, GIOVANNA X2 to LAD and GIOVANNA X2 to OM in '09   • Cancer ([facility])     2017; chemo lympoma   • Cataract    • Cerebrovascular accident (CVA) ([facility]) 12/30/2016    Left arm weakness  etiology of stroke not established, lymphoma discovered on MRI evaluation of stroke, L hemiparesis much worse after acute infectious illness in mid 2017, but no specific diagnosed recurrent neurological etiology, all at [facility]   • CKD (chronic kidney disease) stage 3, GFR 30-59 ml/min    • Controlled gout 2014   • Coronary atherosclerosis of native coronary artery     S/P PTCA (percutaneous transluminal coronary angioplasty), RCA, 5/1997, patent on cath 7/10/2009 at the time of interventions on his left anterior descending and circumflex coronary arteries   • Depression    • Diabetes ([facility])    • Enterococcal septicemia ([facility]) 8/12/2017    • Hypertension    • Hypokalemia 2012    controlled with combination of ACE inhibitor or ARB plus spironolactone   • Hypomagnesemia 08/12/2017    etiology uncertain   • 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 Barry   • NSTEMI (non-ST elevated myocardial infarction) ([facility]) 07/18/2017    complicating UTI with sepsis   • Pain    • Polyneuropathy in diabetes(357.2) 9/11/2013   • Septic shock ([facility]) 5/20/2017   • Skin ulcer of calf ([facility]) 2015    Right, Dr. Terry and wound care   • Stroke ([facility]) 2016    left sided weakness   • Urinary bladder disorder    • Urinary incontinence    • Wound of left leg 2012    Requiring surgery and debridment, Dr. Moore       Social History   Substance Use Topics   • Smoking status: Never Smoker   • Smokeless tobacco: Never Used   • Alcohol use No       Allergies: Diphenhydramine hcl; Lorazepam; and Ciprofloxacin    Pt's medication and problem list reviewed.     Objective:     PE:  /68   Pulse 78   Temp (!) 35.8 °C (96.4 °F)   Ht 1.829 m (6')   Wt 90.7 kg (200 lb) Comment: Pt states  SpO2 97%   BMI 27.12 kg/m²     Vital signs reviewed    Constitutional: Appears well-developed and well-nourished. No acute distress.    Eyes: Conjunctivae normal and EOM are normal. Pupils are equal, round, and reactive to light.   Neck: Trachea midline. Normal range of motion. No JVD.  Neck supple.  Cardiovascular: Normal rate, regular rhythm, normal heart sounds. No murmur, gallop, or friction rub. No edema.  Respiratory: No respiratory distress, unlabored respiratory effort.  Lungs clear to auscultation bilaterally. No wheezes.   Abdomen: Soft, non tender, non-distended. BS + x 4. No masses.   : No CVA tenderness.  Musculoskeletal: Wheelchair.  Normal range of motion to all extremities.  No joint or bone tenderness, swelling, erythema or deformity.    Neurologic: Speech fluent without dysarthria.   Left facial droop.  Skin: Warm and dry.  No visible rashes or lesions.  Psychiatric: Alert and oriented to person, place, and time. Normal mood. Flat affect.      Microbiology:  08/28/18   URINE CULTURE(NEW)    ENTEROBACTER HORMAECHEI     Antibiotic Sensitivity Microscan Unit Status   Ampicillin Resistant >16 mcg/mL Final   Cefepime Sensitive <=8 mcg/mL Final   Cefotaxime Sensitive <=2 mcg/mL Final   Cefotetan Resistant >32 mcg/mL Final   Ceftazidime Sensitive 4 mcg/mL Final   Ceftriaxone Sensitive <=8 mcg/mL Final   Cefuroxime Resistant >16 mcg/mL Final   Cephalothin Resistant >16 mcg/mL Final   Ciprofloxacin Resistant >2 mcg/mL Final   Gentamicin Sensitive <=4 mcg/mL Final   Levofloxacin Resistant >4 mcg/mL Final   Nitrofurantoin Intermediate 64 mcg/mL Final   Pip/Tazobactam Sensitive <=16 mcg/mL Final   Piperacillin Sensitive <=16 mcg/mL Final   Tobramycin Sensitive <=4 mcg/mL Final   Trimeth/Sulfa Sensitive <=2/38 mcg/mL Final       Assessment and Plan:   The following treatment plan was discussed with patient at length:    1. Recurrent UTI  BASIC METABOLIC PANEL    -Finish p.o. Bactrim.  No additional antibiotics follow.  Monitor for s/sx of worsening off abx: urinary symptoms- hesitancy, frequency, urgency, dysuria, hematuria, flank pain, urine odor, fevers, chills, general malaise, etc.  Notify ID or go to ER should these s/sx occur.  Should symptoms recur, will check UA with culture.  -Discussed avoiding UA cultures if patient is not symptomatic.  Wife emphasizes that the only symptom historically has been foul odor to urine.  -Discussed standing or sitting at the edge of the bed to void to ensure that the bladder is being emptied versus laying in bed to pee in urinal.  Drink plenty of fluids to avoid dehydration.  -Check BMP for nephrotoxicity.  -Follow-up with urology as directed.   2. Infection due to Enterobacter species  BASIC METABOLIC PANEL    As above.   3. CKD (chronic kidney disease) stage 3, GFR  30-59 ml/min      Check , adjust antibiotics renally in the future as needed.  Avoid nephrotoxins.  Follow up with nephrology as directed.   4. 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  Continue to monitor blood sugars closely as DM will impair wound healing and can worsen infection.     Follow up: PRN, RTC sooner if needed. FU with PCP for ongoing chronic medical conditions.     Rosa Stanford, [email or web address].[email or web address].       >15 min spent face to face with patient, >50% of time spent counseling, coordinating care, reviewing records, discussing POC, educating patient.    Please note that this dictation was created using voice recognition software. I have  worked with technical experts from [facility]  [employer] to optimize the interface.  I have made every reasonable attempt to correct obvious errors, but there may be errors of grammar and possibly content that I did not discover before finalizing the note.

## 2018-09-12 ENCOUNTER — HOSPITAL ENCOUNTER (OUTPATIENT)
Dept: LAB | Facility: MEDICAL CENTER | Age: 73
End: 2018-09-12
Attending: NURSE PRACTITIONER
Payer: MEDICARE

## 2018-09-12 DIAGNOSIS — A49.8 INFECTION DUE TO ENTEROBACTER SPECIES: ICD-10-CM

## 2018-09-12 DIAGNOSIS — N39.0 RECURRENT UTI: ICD-10-CM

## 2018-09-12 LAB
ANION GAP SERPL CALC-SCNC: 9 MMOL/L (ref 0–11.9)
BUN SERPL-MCNC: 50 MG/DL (ref 8–22)
CALCIUM SERPL-MCNC: 9.7 MG/DL (ref 8.5–10.5)
CHLORIDE SERPL-SCNC: 102 MMOL/L (ref 96–112)
CO2 SERPL-SCNC: 25 MMOL/L (ref 20–33)
CREAT SERPL-MCNC: 2.56 MG/DL (ref 0.5–1.4)
GLUCOSE SERPL-MCNC: 86 MG/DL (ref 65–99)
POTASSIUM SERPL-SCNC: 4.3 MMOL/L (ref 3.6–5.5)
SODIUM SERPL-SCNC: 136 MMOL/L (ref 135–145)

## 2018-09-12 PROCEDURE — 80048 BASIC METABOLIC PNL TOTAL CA: CPT

## 2018-09-12 PROCEDURE — 36415 COLL VENOUS BLD VENIPUNCTURE: CPT

## 2018-09-14 ENCOUNTER — TELEPHONE (OUTPATIENT)
Dept: INFECTIOUS DISEASES | Facility: MEDICAL CENTER | Age: 73
End: 2018-09-14

## 2018-09-14 DIAGNOSIS — R79.89 ELEVATED SERUM CREATININE: ICD-10-CM

## 2018-09-14 NOTE — TELEPHONE ENCOUNTER
Called and LM for pt with all given information/instructions. Pt is to return my call  If he has any questions or concerns. SARBJIT

## 2018-09-14 NOTE — TELEPHONE ENCOUNTER
----- Message from CHANELLE Santiago sent at 9/14/2018  2:20 PM PDT -----  Please let patient know that I have ordered a another BMP that I would like done 1 week after completing all antibiotics due to elevated creatinine.

## 2018-09-21 ENCOUNTER — HOSPITAL ENCOUNTER (OUTPATIENT)
Dept: LAB | Facility: MEDICAL CENTER | Age: 73
End: 2018-09-21
Attending: NURSE PRACTITIONER
Payer: MEDICARE

## 2018-09-21 DIAGNOSIS — R79.89 ELEVATED SERUM CREATININE: ICD-10-CM

## 2018-09-21 PROCEDURE — 36415 COLL VENOUS BLD VENIPUNCTURE: CPT

## 2018-09-21 PROCEDURE — 80048 BASIC METABOLIC PNL TOTAL CA: CPT

## 2018-09-22 LAB
ANION GAP SERPL CALC-SCNC: 4 MMOL/L (ref 0–11.9)
BUN SERPL-MCNC: 37 MG/DL (ref 8–22)
CALCIUM SERPL-MCNC: 9.7 MG/DL (ref 8.5–10.5)
CHLORIDE SERPL-SCNC: 104 MMOL/L (ref 96–112)
CO2 SERPL-SCNC: 28 MMOL/L (ref 20–33)
CREAT SERPL-MCNC: 1.97 MG/DL (ref 0.5–1.4)
GLUCOSE SERPL-MCNC: 107 MG/DL (ref 65–99)
POTASSIUM SERPL-SCNC: 4.9 MMOL/L (ref 3.6–5.5)
SODIUM SERPL-SCNC: 136 MMOL/L (ref 135–145)

## 2018-09-26 ENCOUNTER — NON-PROVIDER VISIT (OUTPATIENT)
Dept: WOUND CARE | Facility: MEDICAL CENTER | Age: 73
End: 2018-09-26
Attending: INTERNAL MEDICINE
Payer: MEDICARE

## 2018-09-26 PROCEDURE — 99201 HCHG LEVEL I NEW PATIENT: CPT

## 2018-09-26 PROCEDURE — 97602 WOUND(S) CARE NON-SELECTIVE: CPT

## 2018-09-26 NOTE — PROGRESS NOTES
Advanced Wound Care  [facility]  [address]  [address]  [place] [address]  [phone] Fax: [phone]    Initial Evaluation  For Certification Period: 09/26/2018 - 12/16/2018  Start of Care: 09/26/2018  Referring Physician: Trinidad Maguire MD  Primary Physician: Mitchell Pantoja MD         Wound(s): Left medial ankle; left posteromedial leg x 2; left anterior leg; right posterolateral leg       Subjective:        HPI: Patient is a 73 year old male with a history of cardiovascular disease; past CVA with L hemiparesis; and Type II DM with last A1c 6.7 on 7/18/18 who presents today with several crusted areas on his left leg and one on his right leg. Patient's wife has been caring for them at home and states that the left anterior leg and left posteromedial leg wounds have been present for approximately 2-3 months old (June-July 2018); the left medial ankle and right posterolateral leg wounds just began around the beginning of September. She was applying Betadine to the wounds but it started causing redness around wounds so she stopped and just washed with soap and water. She covers them with bandaids when needed. She reports little to no drainage coming from wounds.        Pain: Patient reports tenderness to his left leg wounds - 5-10 minute dwell time of viscous lidocaine              Objective:      Tests and Measures:  09/26/2018: Unable to perform PADMINI due to wound location. Right DP, PT doppler pulses are multiphasic and strong; pulses palpable at 2+. Difficult to doppler or palpate left foot pulses as patient has an involuntary spasm of his leg since the CVA. Was able to doppler PT pulse which was biphasic.      Patient had arterial studies in Feb 2018 which was reviewed by Dr. Terry and her impression is as follows: Mr. Bob presents after multiple medical problems and prolonged hospitalizations with imperfect perfusion through the tibial arteries. 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 It has not worsened with his wife diligent attention and I think is likely to heal with regular wound care. We can consider arterial intervention the wounds deteriorate or do not progress. However with normal perfusion down to the popliteal arteries and only left posterior tibial artery stenosis, the chance that substantial benefit from tibial atherectomy is small.              Orthotic, protective, supportive devices: none    Fall Risk Assessment (naomy all that apply with an X):  Completed 09/26/2018 pt is a high fall risk   x65 years or older     xFall within the last 2 years, uses   xAmbulatory devices  xLoss of protective sensation in feet,   Use of prostethic/orthotic, years    Presence of lower extremity/foot/toe amputation   xTaking medication that increases risk (per facility policy)    Interventions Recommended (if any of the above are selected):   Use of Assistive Device: Wheelchair, walker   Supervision with ambulation:  Caregiver   Assistance with ambulation:  Caregiver   Home safety education:  Educational material provided     Procedures:     Debridement: Non selective blunt debridement with NS and gauze. Unable to perform sharp debridement on the left leg due to patient's involuntary movements.    Cleansed with: NS to wounds                                                                         Periwound protected with: Skin prep   Primary dressing: Honey colloid   Secondary Dressing: Ad foams    Other: Tubi D BLE     Patient Education: Pt instr increased protein diet, use of MVI and Arginaid supplementation (check if ok with PCP first); instr s/s infection, increased erythema and edema/fever/chills/N+V when to call MD/go to ER. Instr rational for wound care products. Instr to make appts for 1 x week. Supplied spouse with the rest of the  wound care supplies to take home. Instructed her how to change dressings and to do so once a week. Verbal teach back successful. Advised that I would place an order with [employer] for wound care supplies to be sent to their home as they do not have any dressing supplies. Instr to keep dressings clean and dry, shower on clinic days right before coming in. Wound care folder with written instr (including fall prevention) given to pt. Pt instr keep tight control over FBS, <140 for optimal wound healing; Implications of loss of protective sensation (LOPS) discussed with patient, including increased risk for amputation.  Advised to check feet at least daily, moisturize feet, and to always wear protective foot wear, arrange meticulous regular foot care by podiatrist or [facility]. Pt with good understanding.       Professional Collaboration: Initial evaluation faxed to referring provider       Assessment:      Wound etiology: Diabetic wounds complicated by elevated blood sugars and arterial insufficiency.     Wound Progress:  Initial Evaluation    Rationale for Treatment: Honey colloid to allow rapid epithelialization at this phase of wound healing, maintain moist wound bed, to lower pH for wound healing and to facilitate autolytic debridement;     Patient tolerance/compliance: Patient did not tolerate treatment well. I was unable to perform much debridement due to involuntary spasming of his left leg and he was tearful despite lidocaine use.     Complicating factors: Known arterial insufficiency; chronicity;     Need for ongoing Advanced Wound Care services:Patient requires skilled therapeutic wound care services for product selection, application of product, debridement, close monitoring with clinical assessment for expedite of wound healing.     Plan:      Treatment Plan and Recommendations:  Diagnosis/ICD10: E11.628 Diabetes with skin complication; E11.65, Z79.4 Type II DM with hyperglycemia, with long-term current use of  insulin    Procedures/CPT: 53843    Frequency: 1x week    At the time of each visit a thorough assessment of the patient is completed to assure the  appropriateness of our plan of care.  The dressings or modalities may need to be adapted   from the original plan to address any significant changes in the wound environment.      Clinician Signature:_______________________________Date__________________      Physician Signature:______________________________Date:__________________

## 2018-09-27 NOTE — PROCEDURES
Non selective blunt debridement with NS and gauze to remove non viable tissue from all wound beds. Unable to perform sharp debridement to left leg wounds as patient has an involuntary spasmic movement. Attempted with forceps but was unsuccessful and patient complained of pain.

## 2018-10-03 ENCOUNTER — NON-PROVIDER VISIT (OUTPATIENT)
Dept: WOUND CARE | Facility: MEDICAL CENTER | Age: 73
End: 2018-10-03
Attending: INTERNAL MEDICINE
Payer: MEDICARE

## 2018-10-03 DIAGNOSIS — I10 ESSENTIAL HYPERTENSION: ICD-10-CM

## 2018-10-03 PROCEDURE — 99214 OFFICE O/P EST MOD 30 MIN: CPT

## 2018-10-03 RX ORDER — SPIRONOLACTONE 50 MG/1
50 TABLET, FILM COATED ORAL DAILY
Qty: 90 TAB | Refills: 1 | Status: SHIPPED | OUTPATIENT
Start: 2018-10-03 | End: 2019-03-28 | Stop reason: SDUPTHER

## 2018-10-03 ASSESSMENT — PAIN SCALES - GENERAL: PAINLEVEL_OUTOF10: 7

## 2018-10-03 NOTE — NON-PROVIDER
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 These necrotic wounds that have developed on L posterior LE and medial ankle may likely be secondary to this arterial issue worsening. I called and spoke with Dr. Terry, she will see pt on Monday 10/08/2018 at clinic. In the meantime, paint areas with betadine, allow to dry, then DSD. DC medihoney for now until vascular consult.

## 2018-10-04 NOTE — PATIENT INSTRUCTIONS
Keep dressing clean and dry, continue changing dressing at home every 3 days or as needed when saturated; Perform hand hygiene before dressing change. Come in next Monday to see Dr. Terry (vascular). Report ss infection (redness, swelling, localized heat, increased pain to provider. Go to ER if you have fever >101.0F, chills, nausea and vomitting, malaise.

## 2018-10-08 ENCOUNTER — OFFICE VISIT (OUTPATIENT)
Dept: WOUND CARE | Facility: MEDICAL CENTER | Age: 73
End: 2018-10-08
Attending: INTERNAL MEDICINE
Payer: MEDICARE

## 2018-10-08 VITALS
DIASTOLIC BLOOD PRESSURE: 72 MMHG | SYSTOLIC BLOOD PRESSURE: 122 MMHG | HEART RATE: 75 BPM | OXYGEN SATURATION: 96 % | TEMPERATURE: 98.5 F | RESPIRATION RATE: 16 BRPM

## 2018-10-08 PROCEDURE — 11042 DBRDMT SUBQ TIS 1ST 20SQCM/<: CPT

## 2018-10-08 PROCEDURE — 97597 DBRDMT OPN WND 1ST 20 CM/<: CPT | Performed by: SURGERY

## 2018-10-08 ASSESSMENT — PAIN SCALES - GENERAL: PAINLEVEL: NO PAIN

## 2018-10-09 ENCOUNTER — OFFICE VISIT (OUTPATIENT)
Dept: CARDIOLOGY | Facility: MEDICAL CENTER | Age: 73
End: 2018-10-09
Payer: MEDICARE

## 2018-10-09 VITALS
HEART RATE: 72 BPM | DIASTOLIC BLOOD PRESSURE: 80 MMHG | WEIGHT: 201.4 LBS | HEIGHT: 72 IN | OXYGEN SATURATION: 96 % | SYSTOLIC BLOOD PRESSURE: 116 MMHG | BODY MASS INDEX: 27.28 KG/M2

## 2018-10-09 DIAGNOSIS — I48.0 PAROXYSMAL ATRIAL FIBRILLATION (HCC): ICD-10-CM

## 2018-10-09 DIAGNOSIS — E78.5 HYPERLIPIDEMIA ASSOCIATED WITH TYPE 2 DIABETES MELLITUS (HCC): ICD-10-CM

## 2018-10-09 DIAGNOSIS — N18.30 CKD (CHRONIC KIDNEY DISEASE) STAGE 3, GFR 30-59 ML/MIN (HCC): ICD-10-CM

## 2018-10-09 DIAGNOSIS — I15.2 HYPERTENSION ASSOCIATED WITH DIABETES (HCC): ICD-10-CM

## 2018-10-09 DIAGNOSIS — E11.59 HYPERTENSION ASSOCIATED WITH DIABETES (HCC): ICD-10-CM

## 2018-10-09 DIAGNOSIS — G81.94 LEFT HEMIPARESIS (HCC): ICD-10-CM

## 2018-10-09 DIAGNOSIS — I25.10 CORONARY ARTERY DISEASE INVOLVING NATIVE CORONARY ARTERY OF NATIVE HEART WITHOUT ANGINA PECTORIS: ICD-10-CM

## 2018-10-09 DIAGNOSIS — E11.69 HYPERLIPIDEMIA ASSOCIATED WITH TYPE 2 DIABETES MELLITUS (HCC): ICD-10-CM

## 2018-10-09 PROCEDURE — 99214 OFFICE O/P EST MOD 30 MIN: CPT | Performed by: INTERNAL MEDICINE

## 2018-10-09 ASSESSMENT — ENCOUNTER SYMPTOMS
BRUISES/BLEEDS EASILY: 1
HEARTBURN: 1
COUGH: 1
MYALGIAS: 1
INSOMNIA: 1

## 2018-10-09 NOTE — PROGRESS NOTES
Cardiology Follow-up Consultation Note    Date of note:    10/9/2018  Primary Care Provider: Mitchell Pantoja M.D.  Referring Provider: Leonardo.     Patient Name: Av Bob   YOB: 1945  MRN:              [id number]    Chief Complaint: chest pain    History of Present Illness: Av Bob is a 73 y.o. male whose current medical problems include CKD stage III, hypertension, CAD  (GIOVANNA to RCA in '97, GIOVANNA X2 to LAD and GIOVANNA X to OM in '09), atrial fibrillation, diabetes, dyslipidemia, gout, CVA 12/2016,  non-hodgkins lymphoma c/b brain lesions with resultant left hemiparesis s/p chemotherapy and depression who is here for follow-up.     Last visit, 4/3/2018:  In terms of his CVA, this was in 2016, and while he was on aspirin and plavix at [facility].  This was in the setting of active lymphoma.    He was also admitted to [facility] again in 5/2017 for sepsis and was noted to have atrial fibrillation at this time. He has no noted recurrence and no episodes before that.  He has never been on anticoagulation for Cva prevention.     In terms of his NHL, he sees Dr. Noel, and his last PET scan showed he was cancer free.     Right leg wounds, currently healing. Evaluated by Dr. Terry for bilateral tibial artery stenosis, and decided against surgery at this time.       Interim Events:  Works with  an hour twice a week, no symptoms.     holter monitor returned with PSVT, but does not feel palpitations.     Started xarelto and had episodes of melena.  Switched back to plavix.  FOBT was negative.     In terms of hypertension, well controlled.     In terms of cancer, no e/o recurrence.       Review of Systems   Constitution: Positive for malaise/fatigue.   Respiratory: Positive for cough.    Hematologic/Lymphatic: Bruises/bleeds easily.   Musculoskeletal: Positive for myalgias.   Gastrointestinal: Positive for heartburn.   Genitourinary: Positive  for frequency.   Psychiatric/Behavioral: The patient has insomnia.        All other systems reviewed and discussed using a comprehensive questionnaire and are negative.       Past Medical History:   Diagnosis Date   • Acute respiratory failure with hypoxia ([facility]) 5/20/2017   • CAD (coronary artery disease)     GIOVANNA to RCA in '97, GIOVANNA X2 to LAD and GIOVANNA X2 to OM in '09   • Cancer ([facility])     2017; chemo lympoma   • Cataract    • Cerebrovascular accident (CVA) ([facility]ly [facility]) 12/30/2016    Left arm weakness  etiology of stroke not established, lymphoma discovered on MRI evaluation of stroke, L hemiparesis much worse after acute infectious illness in mid 2017, but no specific diagnosed recurrent neurological etiology, all at [facility]   • CKD (chronic kidney disease) stage 3, GFR 30-59 ml/min ([facility])    • Controlled gout 2014   • Coronary atherosclerosis of native coronary artery     S/P PTCA (percutaneous transluminal coronary angioplasty), RCA, 5/1997, patent on cath 7/10/2009 at the time of interventions on his left anterior descending and circumflex coronary arteries   • Depression    • Diabetes ([facility])    • Enterococcal septicemia ([facility]) 8/12/2017   • Hypertension    • Hypokalemia 2012    controlled with combination of ACE inhibitor or ARB plus spironolactone   • Hypomagnesemia 08/12/2017    etiology uncertain   • Lymphoma ([facility]) 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 Barry   • NSTEMI (non-ST elevated myocardial infarction) ([facility]) 07/18/2017    complicating UTI with sepsis   • Pain    • Polyneuropathy in diabetes(357.2) 9/11/2013   • Septic shock ([facility]) 5/20/2017   • Skin ulcer of calf ([facility]ly [facility]) 2015    Right, Dr. Terry and wound care   • Stroke ([facility]) 2016    left sided weakness   • Urinary bladder disorder    • Urinary incontinence    • Wound of left leg 2012    Requiring surgery and debridment, Dr. Oscar Jett  Surgical History:   Procedure Laterality Date   • CYSTOSCOPY STENT PLACEMENT Left 2/12/2018    Procedure: CYSTOSCOPY  ;  Surgeon: Rey Barry M.D.;  Location: SURGERY [facility];  Service: Urology   • URETEROSCOPY Left 2/12/2018    Procedure: URETEROSCOPY- FLEXIBLE  ;  Surgeon: Rey Barry M.D.;  Location: SURGERY [facility];  Service: Urology   • LASERTRIPSY Left 2/12/2018    Procedure: LASERTRIPSY - LITHO  ;  Surgeon: Rey Barry M.D.;  Location: SURGERY [facility];  Service: Urology   • WOUND CLOSURE GENERAL  4/3/2012    Performed by GERHARD GILBERT at SURGERY SAME DAY [facility] ORS   • CARDIAC CATH  2009    Stents to LAD, Om   • DAGOBERTO BY LAPAROSCOPY  1998   • ANGIOPLASTY  1997    RCA followed by other stents as noted above.    • CARDIAC CATH  1997    stent RCA   • CATARACT EXTRACTION WITH IOL      bilateral   • LITHOTRIPSY     • TONSILLECTOMY AND ADENOIDECTOMY           Current Outpatient Prescriptions   Medication Sig Dispense Refill   • spironolactone (ALDACTONE) 50 MG Tab Take 1 Tab by mouth every day. 90 Tab 1   • fluoxetine (PROZAC) 40 MG capsule TAKE 1 CAPSULE BY MOUTH EVERY DAY 90 Cap 3   • allopurinol (ZYLOPRIM) 300 MG Tab TAKE 1/2 TABLET DAILY UNTIL KIDNEY LABS OK THEN TAKE 1 TABLET BY MOUTH EVERY DAY 90 Tab 1   • Dulaglutide (TRULICITY) 1.5 MG/0.5ML Solution Pen-injector Inject 1.5 mg as instructed every 7 days. 12 PEN 1   • metoprolol (TOPROL-XL) 200 MG XL tablet Take 1 Tab by mouth every evening. 90 Tab 3   • HUMALOG KWIKPEN 100 UNIT/ML Solution Pen-injector injection INJECT 35 UNITS SUBCUTANEOUSLY 3 TIMES DAILY BEFORE MEALS 30 mL 6   • losartan (COZAAR) 50 MG Tab TAKE 1 TABLET BY MOUTH EVERY DAY 90 Tab 3   • Probiotic Product (PROBIOTIC DAILY PO) Take  by mouth.     • clopidogrel (PLAVIX) 75 MG Tab TAKE 1 TABLET BY MOUTH EVERY DAY 90 Tab 1   • atorvastatin (LIPITOR) 20 MG Tab Take 1 Tab by mouth every day. 90 Tab 2   • ascorbic acid (VITAMIN C) 500 MG tablet Take 500 mg by  mouth every day.     • Cholecalciferol (VITAMIN D) 2000 units Cap Take 1 Cap by mouth every evening.     • Insulin Glargine (TOUJEO SOLOSTAR) 300 UNIT/ML Solution Pen-injector Inject 15 Units as instructed every evening. 3 PEN 6   • magnesium oxide (MAG-OX) 400 MG Tab Take 400 mg by mouth 3 times a day.     • fenofibrate (TRICOR) 145 MG Tab Take 1 Tab by mouth every day. 90 Tab 3   • aspirin 81 MG tablet Take 81 mg by mouth every day.     • CENTRUM SILVER PO Take 1 Tab by mouth every day.     • Insulin Pen Needle (BD PEN NEEDLE SWATI U/F) 32G X 4 MM Misc For insulin shots 5 times a day 450 Each 1   • ONE TOUCH ULTRA TEST strip USE TO TEST UP TO FID  2   • Blood Glucose Monitoring Suppl Supplies Misc One Touch ultra glucometer strips for blood sugar check up to 5 times a day 450 Each 2   • Acetaminophen (TYLENOL PO) Take 1,250 mg by mouth 2 times a day as needed. 625 mg each tab     • NON SPECIFIED Apply 1 Application to affected area(s) 3 times a day as needed. Therawax       No current facility-administered medications for this visit.          Allergies   Allergen Reactions   • Diphenhydramine Hcl Anxiety     Pt is able to tolerate  Mg benadryl with less anxiety   • Lorazepam Unspecified     Disorientation   • Ciprofloxacin      Rash,stomach ache         Family History   Problem Relation Age of Onset   • Heart Disease Father         CAD   • Diabetes Father    • Cancer Mother    • Psychiatry Mother         Depression   • Depression Mother    • Kidney stones Brother    • Heart Disease Brother    • Psychiatry Brother         Depression   • Depression Brother    • Suicide Attempts Other    • Psychiatry Other         autism         Social History     Social History   • Marital status:      Spouse name: N/A   • Number of children: N/A   • Years of education: N/A     Occupational History   • Not on file.     Social History Main Topics   • Smoking status: Never Smoker   • Smokeless tobacco: Never Used   • Alcohol use  No   • Drug use: No   • Sexual activity: Not Currently     Partners: Female     Other Topics Concern   • Not on file     Social History Narrative   • No narrative on file         Physical Exam:  Ambulatory Vitals  Blood pressure 116/80, pulse 72, height 1.829 m (6'), weight 91.4 kg (201 lb 6.4 oz), SpO2 96 %.   Oxygen Therapy:  Pulse Oximetry: 96 %  BP Readings from Last 4 Encounters:   10/09/18 116/80   10/08/18 122/72   09/11/18 120/68   08/22/18 130/70       Weight/BMI: Body mass index is 27.31 kg/m².  Wt Readings from Last 4 Encounters:   10/09/18 91.4 kg (201 lb 6.4 oz)   09/11/18 90.7 kg (200 lb)   08/22/18 90.7 kg (200 lb)   07/18/18 90.7 kg (200 lb)       General: No apparent distress  Eyes: nl conjunctiva  ENT: OP clear, normal external appearance of nose and ears  Neck: JVP <8 cm H2O, no carotid bruits  Lungs: normal respiratory effort, CTAB  Heart: RRR, no murmurs, no rubs or gallops, no edema bilateral lower extremities. No LV/RV heave on cardiac palpatation. 2+ bilateral radial pulses.    Abdomen: soft, non tender, non distended, no masses, normal bowel sounds.  No HSM.  Extremities/MSK: no clubbing, no cyanosis  Neurological: decreased sensation on the left, left extremity weakness.   Psychiatric: Appropriate affect, A/O x 3, intact judgement and insight  Skin: Warm extremities    Exam repeated in full and unchanged except for as noted above.      Lab Data Review:  Lab Results   Component Value Date/Time    CHOLSTRLTOT 110 10/06/2017 09:50 AM    LDL 45 10/06/2017 09:50 AM    HDL 42 10/06/2017 09:50 AM    TRIGLYCERIDE 117 10/06/2017 09:50 AM       Lab Results   Component Value Date/Time    SODIUM 136 09/21/2018 01:11 PM    POTASSIUM 4.9 09/21/2018 01:11 PM    CHLORIDE 104 09/21/2018 01:11 PM    CO2 28 09/21/2018 01:11 PM    GLUCOSE 107 (H) 09/21/2018 01:11 PM    BUN 37 (H) 09/21/2018 01:11 PM    CREATININE 1.97 (H) 09/21/2018 01:11 PM    CREATININE 1.4 05/28/2008 05:42 PM    BUNCREATRAT 10 03/27/2017  02:11 PM     Lab Results   Component Value Date/Time    ALKPHOSPHAT 65 02/01/2018 02:53 PM    ASTSGOT 21 02/01/2018 02:53 PM    ALTSGPT 18 02/01/2018 02:53 PM    TBILIRUBIN 0.6 02/01/2018 02:53 PM      Lab Results   Component Value Date/Time    WBC 9.5 06/06/2018 10:27 AM     No components found for: HBGA1C  No components found for: TROPONIN  No components found for: BNP      Cardiac Imaging and Procedures Review:    EKG dated 12/27/2017:  SINUS RHYTHM   RIGHT AXIS DEVIATION, POSSIBLY DUE TO ELECTRODE PLACEMENT AND POSITION   BORDERLINE T ABNORMALITIES, ANT-LAT LEADS, UNCHANGED   BORDERLINE PROLONGED QT INTERVAL   Compared to ECG 09/22/2017 13:03:34   NO SIGNIFICANT CHANGE ALLOWING FOR ELECTRODE PLACEMENT (LEFT ARM AND FOOT)     Echo dated 10/5/2017:   CONCLUSIONS  No prior study is available for comparison.   Normal left ventricular chamber size.  Mild concentric left ventricular hypertrophy.  Left ventricular ejection fraction is visually estimated to be 65%.  Aortic sclerosis without stenosis.  Trace aortic insufficiency.  Trace pulmonic insufficiency.  Normal pericardium without effusion.  Ascending aorta diameter is  3.0cm    Holter, 5/30/2018:    CONCLUSIONS:  * paroxysmal supraventricular tachycardia.  15 episodes over 13 days.  Longest episode 17 seconds  * No atrial fibrillation  * No significant ectopy  * No arrhythmias or ectopy during symptoms      Nuclear Perfusion Imaging (1/2018):   Myocardial Perfusion   Report   NUCLEAR IMAGING INTERPRETATION   The LV is mildly dilated with global hypokinesis.  Calculated LV EF is 49%.     There is a small region of decreased perfusion in the inferior-lateral wall    with a mild amount of inducible ischemia.   ECG INTERPRETATION   Negative stress ECG for ischemia.      Stress test 6/2017 (tiffanie Williamson)    Findings:   Small in size moderate in intensity fixed apical defect. Computer analysis also suggests mild in intensity small in size diminished counts along inferior  wall of left ventricle which appears to improve at time of rest. Summed stress score is   7. Summed rest score is 1. Gross hypomotility of the left ventricle is noted with disordered contraction evident. Global hypokinesis is noted.      [facility] (2009): at [facility], last stent.     Radiology test Review:  CXR: 1/2018   FINDINGS:  The heart is normal in size.  No pulmonary infiltrates or consolidations are noted.  No pleural effusions are appreciated.    Vascular US 2/2018:  Vascular Laboratory   Conclusions   There is no evidence of arterial disease demonstrated (PADMINI is .9-1.0).    Absent flow within the Left VIVIENNE.     Vascular Laboratory   CONCLUSIONS   Right Lower Extremity-   Normal flow from the common femoral artery extending distally to the    popliteal artery. Flow within the posterior tibial artery is brisk and    multiphasic.   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     Left Lower Extremity-   Normal flow seen from the common femoral artery extending distally to the    popliteal artery.   Area of elevated velocities seen within the mid segment of the posterior    tibial artery. Consistent with >50% stenosis.   Occlusion of the anterior tibial artery at the mid-distal segment, minimal    flow reversal seen at the level of the ankle.      MRI 12/31/2016  7 mm acute/early subacute infarct is present in the right anterolateral medulla.  There is no significant mass effect or hemorrhage.  No other recent infarction.    12/31/2016:  Three sites of abnormal parenchymal enhancement are present.  *  At the previously seen right anterior pontine lesion, there is a 5 x 3 x 7 mm elongated enhancing peripheral pontine lesion.  *  There is a second 2 mm enhancing focus also in the right anterior david.  *  There is a third 3 mm lesion at the left globus pallidus internus.    These findings may represent metastatic disease.  The elongated  morphology of the anterior peripheral pontine lesion is somewhat atypical for metastatic lesion, and could potentially represent enhancement of a subacute infarct.  Two other lesions could also, in theory, represent subacute enhancing reperfusion of lacunar infarctions. Anecdotally, this would be an unusual finding in lacunar infarctions. Note that post infarct enhancement typically occurs approximately 1 week after infarction and resolves within 12 weeks or less.       Head CTA 2016:  1. There are plaque formations identified in both carotid bifurcations   (right greater than left). This causes approximately 50% stenosis on the   right and 10-20% stenosis on the left in the proximal internal carotid   arteries. The remainder of the carotid   vasculature is unremarkable.  2. The vertebral arteries are within normal limits.  3. No dissection.  4. A 5.5 mm left thyroid lobe cyst/nodule is present.  5. A 10 x 13 mm partially calcified pulmonary nodule is present in the   left upper lobe. There may be some internal fat density. Findings could   represent a benign pulmonary hamartoma. Recommend clinical correlation.  6. Moderate to advanced degenerative changes are present throughout the   cervical spine.      Medical Decision Makin. Atherosclerosis of native coronary artery of native heart without angina pectoris  S/p 5 stents, last in . Mild ischemia on two recent nuclear perfusion scans    Currently without recurrent chest pain (had some atypical chest pain 2017) so will treat medically. .  -continue aspirin/lipitor      2. 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  Last hgbA1c 8 (2018)    3. Essential hypertension, benign  Well controlled  -continue spironolactone 50mg PO Daily, losartan 50mg PO daily     4. Paroxysmal atrial fibrillation  (CMS-[facility])  Self limited and one time in setting of sepsis. Discussed at length risks of recurrent CVA given his previous likely embolic event, and if he would like an ILR to confirm diagnosis or to start empiric anticoagulation.  He prefers the later and we started xarelto 4/2018.  Unfortunately he had melena almost immediately, and was switched back to aspirin/plavix.  He does not want to retrial anticoagulation, or a GI referral as he sees enough physicians already.   -continue aspirin/plavix. No anticoag for now given patient preference despite risk of recurrent CVA.    -continue metoprolol XL 200mg PO Daily    5. CKD (chronic kidney disease) stage 3, GFR 30-59 ml/min  Stable, followed by Dr. Jarvis  -[facility]    6. Diffuse large cell non-Hodgkin's lymphoma (CMS-HCC)  Followed by Dr. Noel, in remission per report with recent negative PET scan.    7. Left hemiparesis (CMS-[facility])  Acute 12/2016 at [facility].  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  Symptoms originally improved significantly with chemotherapy and he was walking well, and then hemiparesis worsened significantly after sepsis from UTI and norovirus infection mid 2017.  Currently continuing physical therapy, but remains in a wheelchair.       8. Cerebrovascular accident (CVA) due to embolism of cerebral artery (CMS-[facility])  In addition to cerebral enhancing lesions thought to be lymphoma on MRI 12/2016, he was found to have a subacute infarct in the right anterolateral medulla.    -aspirin/plavix for CVA prevention    9. Dyslipidemia  Lipitor, fenofibrate    10. Peripheral vascular disease (CMS-[facility])  Followed by Dr. Terry, thought to not be candidate for bialteral anterior tibial artery disease as the arteries reconstitute and his leg wounds are currently healing.  -continue aspirin/plavix     11. Goals of care - states scanned AD, however no AD or polst visible  -will discuss next  visit.       Return in about 6 months (around 4/9/2019).       Osvaldo Tucker MD  [facility], [address]  [address] [place] [id number]  Phone: [phone]  Fax: [phone]

## 2018-10-09 NOTE — PATIENT INSTRUCTIONS
Apply hydrofiber silver to wound bed as directed by provider. Cover wound with foam and change dressing at end of week..     Should you experience any significant changes in your wound(s), such as infection (redness, swelling, localized heat, increased pain, fever > 101 F, chills) or have any questions regarding your home care instructions, please contact the wound center at [phone]. If after hours, contact your primary care physician or go to the hospital emergency room.   Keep dressing clean, dry and covered while bathing. Only change dressing if it becomes over saturated, soiled or falls off.   Paint scabbed wounds with Betadine daily or every other day.

## 2018-10-09 NOTE — PROGRESS NOTES
Dr. Terry stated pt does not need to be seen by provider. Will be seen 1x/week by RN, wife to change dressings at home.

## 2018-10-09 NOTE — PROCEDURES
Procedure Note:      Mr. Av Bob presents accompanied by his wife.   He has peripheral arterial disease and diabetes with what appears to be tibial artery occlusive disease.  He is difficult to treat because of the limitations of stroke.  I believe the wounds he has today are due to positioning problems or trauma.    Arterial duplex results from this spring   Right Lower Extremity-   Normal flow from the common femoral artery extending distally to the    popliteal artery. Flow within the posterior tibial artery is brisk and    multiphasic.   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     Left Lower Extremity-   Normal flow seen from the common femoral artery extending distally to the    popliteal artery.   Area of elevated velocities seen within the mid segment of the posterior    tibial artery. Consistent with >50% stenosis.   Occlusion of the anterior tibial artery at the mid-distal segment, minimal    flow reversal seen at the level of the ankle.    BUN and creatinine are 37 and 1.97 but have been 50 and 2.6 within the last month.    Informed consent was given.  Questions were asked.    Using a 15 blade, debridement was performed on the posterior aspect of the left leg.  2 wounds were debrided one measuring 0.8 x 0.4 x 0.2 cm.  Only eschar and necrotic tissue was removed, without the benefit of local or even topical anesthesia.  The second wound resulted after lifting dry eschar from the base and resulting in a wound measuring 0.2 x 0.2 x 0.2 cm.  Excisional debridement was performed and only minimal bleeding was obtained.    Impression: Although Av has tibial artery occlusive disease, Doppler exam today demonstrated no evidence of a posterior tibial pulse.  I can hear a dorsal pedal pulse which, by interpretation of the noninvasive studies is likely collaterals from the peroneal artery.  Arterial perfusion appears to be  normal to the popliteal artery and his tibial arteries would be difficult to treat and his stroke would make positioning of that leg very difficult with his hyperreflexia.  This may result in the administration of more contrast than usual and could hasten renal demise.  At this point, I think we are best doing cautious debridement of the posterior calf ulcers, painting the medial ankle ulcers with Betadine.  At this point, I do not think a provider visit is needed.

## 2018-10-10 ENCOUNTER — APPOINTMENT (OUTPATIENT)
Dept: WOUND CARE | Facility: MEDICAL CENTER | Age: 73
End: 2018-10-10
Attending: INTERNAL MEDICINE
Payer: MEDICARE

## 2018-10-11 ENCOUNTER — NON-PROVIDER VISIT (OUTPATIENT)
Dept: WOUND CARE | Facility: MEDICAL CENTER | Age: 73
End: 2018-10-11
Attending: INTERNAL MEDICINE
Payer: MEDICARE

## 2018-10-11 ENCOUNTER — HOSPITAL ENCOUNTER (OUTPATIENT)
Dept: LAB | Facility: MEDICAL CENTER | Age: 73
End: 2018-10-11
Attending: FAMILY MEDICINE
Payer: MEDICARE

## 2018-10-11 DIAGNOSIS — E11.3293 CONTROLLED TYPE 2 DIABETES MELLITUS WITH BOTH EYES AFFECTED BY MILD NONPROLIFERATIVE RETINOPATHY WITHOUT MACULAR EDEMA, WITHOUT LONG-TERM CURRENT USE OF INSULIN (HCC): ICD-10-CM

## 2018-10-11 DIAGNOSIS — I25.10 CORONARY ARTERY DISEASE INVOLVING NATIVE CORONARY ARTERY OF NATIVE HEART WITHOUT ANGINA PECTORIS: ICD-10-CM

## 2018-10-11 DIAGNOSIS — E78.5 HYPERLIPIDEMIA ASSOCIATED WITH TYPE 2 DIABETES MELLITUS (HCC): ICD-10-CM

## 2018-10-11 DIAGNOSIS — E11.69 HYPERLIPIDEMIA ASSOCIATED WITH TYPE 2 DIABETES MELLITUS (HCC): ICD-10-CM

## 2018-10-11 LAB
CHOLEST SERPL-MCNC: 171 MG/DL (ref 100–199)
FASTING STATUS PATIENT QL REPORTED: NORMAL
HDLC SERPL-MCNC: 32 MG/DL
LDLC SERPL CALC-MCNC: 111 MG/DL
TRIGL SERPL-MCNC: 140 MG/DL (ref 0–149)

## 2018-10-11 PROCEDURE — 80061 LIPID PANEL: CPT

## 2018-10-11 PROCEDURE — 36415 COLL VENOUS BLD VENIPUNCTURE: CPT

## 2018-10-11 PROCEDURE — 97602 WOUND(S) CARE NON-SELECTIVE: CPT

## 2018-10-11 NOTE — PROCEDURES
Wound Care Procedures 10/11/2018:  Nonselective debridement with NS and gauze to remove biofilm from left posteromedial LE wounds.

## 2018-10-11 NOTE — PATIENT INSTRUCTIONS
-Keep your dressing on your left posterior leg clean, dry and covered while bathing. Only change dressings if they become over saturated, soiled or fall off.     -Paint your closed wounds with Betadine every day.    -Should you experience any significant changes in your wound(s), such as infection (redness, swelling, localized heat, increased pain, fever > 101 F, chills) or have any questions regarding your home care instructions, please contact the wound center at [phone]. If after hours, contact your primary care physician or go to the hospital emergency room.

## 2018-10-17 ENCOUNTER — NON-PROVIDER VISIT (OUTPATIENT)
Dept: WOUND CARE | Facility: MEDICAL CENTER | Age: 73
End: 2018-10-17
Attending: INTERNAL MEDICINE
Payer: MEDICARE

## 2018-10-17 PROCEDURE — 97602 WOUND(S) CARE NON-SELECTIVE: CPT

## 2018-10-18 NOTE — PATIENT INSTRUCTIONS
Should you experience any significant changes in your wound(s) such as infection (redness, swelling, localized heat, increased pain, fever >101 F, chills) or have any questions regarding your home care instructions, please contact the wound center [phone]. If after hours, contact your primary care physician or go the hospital emergency room.  Keep dressing clean and dry and cover while bathing. Only change dressing if over saturated, soiled or its falling off.

## 2018-10-19 ENCOUNTER — OFFICE VISIT (OUTPATIENT)
Dept: MEDICAL GROUP | Facility: MEDICAL CENTER | Age: 73
End: 2018-10-19
Payer: MEDICARE

## 2018-10-19 VITALS
TEMPERATURE: 97 F | DIASTOLIC BLOOD PRESSURE: 64 MMHG | SYSTOLIC BLOOD PRESSURE: 100 MMHG | HEIGHT: 72 IN | OXYGEN SATURATION: 97 % | HEART RATE: 73 BPM

## 2018-10-19 DIAGNOSIS — K21.00 GERD WITH ESOPHAGITIS: ICD-10-CM

## 2018-10-19 DIAGNOSIS — Z99.3 WHEELCHAIR DEPENDENT: ICD-10-CM

## 2018-10-19 DIAGNOSIS — N39.0 RECURRENT UTI: ICD-10-CM

## 2018-10-19 DIAGNOSIS — E11.69 HYPERLIPIDEMIA ASSOCIATED WITH TYPE 2 DIABETES MELLITUS (HCC): ICD-10-CM

## 2018-10-19 DIAGNOSIS — F33.1 MODERATE EPISODE OF RECURRENT MAJOR DEPRESSIVE DISORDER (HCC): Primary | ICD-10-CM

## 2018-10-19 DIAGNOSIS — E78.5 HYPERLIPIDEMIA ASSOCIATED WITH TYPE 2 DIABETES MELLITUS (HCC): ICD-10-CM

## 2018-10-19 PROBLEM — Z85.72 H/O NON-HODGKIN'S LYMPHOMA: Status: ACTIVE | Noted: 2017-05-08

## 2018-10-19 PROCEDURE — 99215 OFFICE O/P EST HI 40 MIN: CPT | Performed by: FAMILY MEDICINE

## 2018-10-19 RX ORDER — ATORVASTATIN CALCIUM 40 MG/1
40 TABLET, FILM COATED ORAL DAILY
Qty: 90 TAB | Refills: 0 | Status: SHIPPED | OUTPATIENT
Start: 2018-10-19 | End: 2019-01-08 | Stop reason: SDUPTHER

## 2018-10-19 RX ORDER — MAGNESIUM OXIDE 400 MG/1
400 TABLET ORAL 3 TIMES DAILY
Qty: 270 TAB | Refills: 0 | Status: SHIPPED | OUTPATIENT
Start: 2018-10-19 | End: 2019-01-22 | Stop reason: SDUPTHER

## 2018-10-22 NOTE — PROGRESS NOTES
Subjective:   Chief Complaint/History of Present Illness:  Av Bob is a 73 y.o. male established patient who presents today to discuss medical problems as listed below    The primary encounter diagnosis was (HCC) Moderate episode of recurrent major depressive disorder. Diagnoses of GERD with esophagitis, Recurrent UTI, (HCC) Hyperlipidemia associated with type 2 diabetes mellitus, and Wheelchair dependent were also pertinent to this visit.    (HCC) Moderate episode of recurrent major depressive disorder  Chronic, stable, well-controlled, taking medication as directed.     SI and HI are negative, patient not having adverse serotenergic s/sx.    No s/sx of serotonin syndrome: Agitation or restlessness, Confusion, Rapid heart rate and high blood pressure, Dilated pupils, Loss of muscle coordination or twitching muscles, Muscle rigidity, Heavy sweating, Diarrhea, Headache, Shivering, Goose bumps, High fever, Seizures, Irregular heartbeat, Unconsciousness.      GERD with esophagitis  New problem for medical evaluation, uncontrolled, patient states that he experiences epigastric tenderness, particularly with eating certain foods.  Patient is unsure at present time which foods may cause this to happen more likely than others.  However, patient is wondering what he can do for prophylaxis or treatment.  Patient is also wondering whether he should have evaluation.    At present time, symptoms are specifically as above, in addition to ROS being negative for nausea, emesis, hematemesis, hematochezia, melena.    I discussed with patient that we could have evaluation with H. pylori stool antigen, could also refer him to gastroenterology for possible endoscopy depending upon response of symptoms to OTC medication.  Initially, I am recommending that he use a as needed medication such as Tums, but that if he needs to can upgrade to as needed usage of H2 blockers, or daily usage of PPIs.  Patient has been advised that  once he needs to have daily usage of any of these medications, he should probably come in for evaluation.  Patient verbalized understanding.    Recurrent UTI  Chronic, stable, well-controlled at present time.  Patient currently asymptomatic.  However, due to patient's former CVA, he may not be as fully able to determine if he is having UTI symptoms.  Therefore, patient has been advised that he can have his urine checked between clinic visits, and that I can respond to those results as well as report from him on his symptoms.    ROS is NEGATIVE for fevers, chills, rigors, flank pain, dysuria, hematuria, pyuria, polyuria, increased frequency of urination, diarrhea, constipation.     (HCC) Hyperlipidemia associated with type 2 diabetes mellitus  Patient and I discussed recent labs (see below; mixed dyslipidemia, uncontrolled) and that ASCVD risk is increased based on most recent lipid panel, current blood pressure (hypertensive, with medication), diabetes status (diabetic), and smoking status (non-smoker).    Patient and I then discussed necessary dietary changes to make to address dyslipidemia.  Patient is currently taking cholesterol lowering medication: Atorvastatin.  Patient verbalized understanding.    ROS is NEGATIVE for dizziness, generalized weakness/fatigue, vision/hearing changes, jaw pain/paresthesias, BUE pain/paresthesias/numbness/weakness, chest pain/pressure, palpitations, dyspnea, RUQ abdominal pain, oliguria/anuria, BLE edema.    Lab Results   Component Value Date/Time    CHOLSTRLTOT 171 10/11/2018 09:35 AM     (H) 10/11/2018 09:35 AM    HDL 32 (A) 10/11/2018 09:35 AM    TRIGLYCERIDE 140 10/11/2018 09:35 AM        Wheelchair dependent  Chronic, uncontrolled, however improving.  Patient continues with outpatient physical therapy, and is finding that he has increasing stamina as well as increasing ability to ambulate with and without assistive devices.      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, without long-term current use of insulin 12/30/2016     Priority: Medium   • H/O Cerebrovascular accident (CVA) in adulthood 12/30/2016     Priority: Medium   • Coronary artery disease involving native coronary artery of native heart without angina pectoris 12/30/2016     Priority: Medium   • (HCC) Hyperlipidemia associated with type 2 diabetes mellitus 12/13/2011     Priority: Medium   • Recurrent UTI 04/26/2018     Priority: Low   • (HCC) Left hemiparesis 12/27/2017     Priority: Low   • (HCC) Diabetic nephropathy associated with type 2 diabetes mellitus 11/30/2017     Priority: Low   • Psychophysiological insomnia 11/21/2017     Priority: Low   • (HCC) Moderate episode of recurrent major depressive disorder 11/07/2017     Priority: Low   • Wheelchair dependent 11/07/2017     Priority: Low   • Normocytic hypochromic anemia 05/20/2017     Priority: Low   • H/O non-Hodgkin's lymphoma 05/08/2017     Priority: Low   • (HCC) CKD (chronic kidney disease) stage 3, GFR 30-59 ml/min 08/25/2016     Priority: Low   • Idiopathic chronic gout of multiple sites without tophus 01/28/2014     Priority: Low   • GERD with esophagitis 10/19/2018       Additional History:   Allergies:    Diphenhydramine hcl; Lorazepam; and Ciprofloxacin     Current Medications:     Current Outpatient Prescriptions   Medication Sig Dispense Refill   • magnesium oxide (MAG-OX) 400 MG Tab Take 1 Tab by mouth 3 times a day. 270 Tab 0   • atorvastatin (LIPITOR) 40 MG Tab Take 1 Tab by mouth every day. 90 Tab 0   • clopidogrel (PLAVIX) 75 MG Tab Take 1 Tab by mouth every day. 90 Tab 2   • spironolactone (ALDACTONE) 50 MG Tab Take 1 Tab by mouth every day. 90 Tab 1   •  fluoxetine (PROZAC) 40 MG capsule TAKE 1 CAPSULE BY MOUTH EVERY DAY 90 Cap 3   • allopurinol (ZYLOPRIM) 300 MG Tab TAKE 1/2 TABLET DAILY UNTIL KIDNEY LABS OK THEN TAKE 1 TABLET BY MOUTH EVERY DAY 90 Tab 1   • Dulaglutide (TRULICITY) 1.5 MG/0.5ML Solution Pen-injector Inject 1.5 mg as instructed every 7 days. 12 PEN 1   • metoprolol (TOPROL-XL) 200 MG XL tablet Take 1 Tab by mouth every evening. 90 Tab 3   • Insulin Pen Needle (BD PEN NEEDLE SWATI U/F) 32G X 4 MM Misc For insulin shots 5 times a day 450 Each 1   • ONE TOUCH ULTRA TEST strip USE TO TEST UP TO FID  2   • HUMALOG KWIKPEN 100 UNIT/ML Solution Pen-injector injection INJECT 35 UNITS SUBCUTANEOUSLY 3 TIMES DAILY BEFORE MEALS 30 mL 6   • losartan (COZAAR) 50 MG Tab TAKE 1 TABLET BY MOUTH EVERY DAY 90 Tab 3   • Probiotic Product (PROBIOTIC DAILY PO) Take  by mouth.     • Blood Glucose Monitoring Suppl Supplies Misc One Touch ultra glucometer strips for blood sugar check up to 5 times a day 450 Each 2   • ascorbic acid (VITAMIN C) 500 MG tablet Take 500 mg by mouth every day.     • Cholecalciferol (VITAMIN D) 2000 units Cap Take 1 Cap by mouth every evening.     • Acetaminophen (TYLENOL PO) Take 1,250 mg by mouth 2 times a day as needed. 625 mg each tab     • NON SPECIFIED Apply 1 Application to affected area(s) 3 times a day as needed. Therawax     • Insulin Glargine (TOUJEO SOLOSTAR) 300 UNIT/ML Solution Pen-injector Inject 15 Units as instructed every evening. 3 PEN 6   • fenofibrate (TRICOR) 145 MG Tab Take 1 Tab by mouth every day. 90 Tab 3   • aspirin 81 MG tablet Take 81 mg by mouth every day.     • CENTRUM SILVER PO Take 1 Tab by mouth every day.       No current facility-administered medications for this visit.         Social History:     Social History   Substance Use Topics   • Smoking status: Never Smoker   • Smokeless tobacco: Never Used   • Alcohol use No       ROS:     - NOTE: All other systems reviewed and are negative, except as in HPI.      "Objective:   Physical Exam:    Vitals: Blood pressure 100/64, pulse 73, temperature 36.1 °C (97 °F), height 1.829 m (6' 0.01\"), SpO2 97 %.   BMI: There is no height or weight on file to calculate BMI.   General/Constitutional: Vitals as above, Well nourished, well developed male in no acute distress   Head/Eyes: Head is grossly normal & atraumatic, bilateral conjunctivae clear and not injected, bilateral EOMI, bilateral PERRL   ENT: Bilateral external ears grossly normal in appearance, Hearing grossly intact, External nares normal in appearance and without discharge/bleeding   Respiratory: No respiratory distress, bilateral lungs are clear to ausculation in all lung fields (anterior/lateral/posterior), no wheezing/rhonchi/rales   Cardiovascular: Regular rate and rhythm without murmur/gallops/rubs, distal pulses are intact and equal bilaterally (radial, posterior tibial), no bilateral lower extremity edema   MSK: Patient is wheelchair-bound with noticeable hemiplegia of the left side   Integumentary: No apparent rashes   Psych: Judgment grossly appropriate, no apparent depression/anxiety    Health Maintenance:     - Patient apparently received flu shot at outside facility    Imaging/Labs:     -Not reviewed at this visit    Assessment and Plan:   1. (HCC) Moderate episode of recurrent major depressive disorder  Chronic, stable, well-controlled.  Continue taking medication as directed.     2. GERD with esophagitis  New problem for medical evaluation, uncontrolled, patient advised as in HPI above    3. Recurrent UTI  Chronic, stable, well-controlled at present time.  Patient will be given urine lab orders for symptoms between clinic visits.   - URINALYSIS,CULTURE IF INDICATED; Future    4. (HCC) Hyperlipidemia associated with type 2 diabetes mellitus  Chronic, uncontrolled, patient advised to work on lifestyle changes, then to recheck in the future.  Continue his statin for now.   - atorvastatin (LIPITOR) 40 MG Tab; " Take 1 Tab by mouth every day.  Dispense: 90 Tab; Refill: 0   - LIPID PROFILE; Future    5. Wheelchair dependent  Chronic, stable, well-controlled.    NOTE: A total of 40minutes was spent in direct face-to-face time with the patient, of which over 50% of the time was spent in counseling and/or coordination of care as discussed above, particularly with regards to GERD and wheelchair dependence.      RTC: in 4months for Coordination of Care, GERD.    PLEASE NOTE: This dictation was created using voice recognition software. I have made every reasonable attempt to correct obvious errors, but I expect that there are errors of grammar and possibly content that I did not discover before finalizing the note.

## 2018-10-22 NOTE — ASSESSMENT & PLAN NOTE
New problem for medical evaluation, uncontrolled, patient states that he experiences epigastric tenderness, particularly with eating certain foods.  Patient is unsure at present time which foods may cause this to happen more likely than others.  However, patient is wondering what he can do for prophylaxis or treatment.  Patient is also wondering whether he should have evaluation.    At present time, symptoms are specifically as above, in addition to ROS being negative for nausea, emesis, hematemesis, hematochezia, melena.    I discussed with patient that we could have evaluation with H. pylori stool antigen, could also refer him to gastroenterology for possible endoscopy depending upon response of symptoms to OTC medication.  Initially, I am recommending that he use a as needed medication such as Tums, but that if he needs to can upgrade to as needed usage of H2 blockers, or daily usage of PPIs.  Patient has been advised that once he needs to have daily usage of any of these medications, he should probably come in for evaluation.  Patient verbalized understanding.

## 2018-10-22 NOTE — ASSESSMENT & PLAN NOTE
Chronic, uncontrolled, however improving.  Patient continues with outpatient physical therapy, and is finding that he has increasing stamina as well as increasing ability to ambulate with and without assistive devices.

## 2018-10-22 NOTE — ASSESSMENT & PLAN NOTE
Patient and I discussed recent labs (see below; mixed dyslipidemia, uncontrolled) and that ASCVD risk is increased based on most recent lipid panel, current blood pressure (hypertensive, with medication), diabetes status (diabetic), and smoking status (non-smoker).    Patient and I then discussed necessary dietary changes to make to address dyslipidemia.  Patient is currently taking cholesterol lowering medication: Atorvastatin.  Patient verbalized understanding.    ROS is NEGATIVE for dizziness, generalized weakness/fatigue, vision/hearing changes, jaw pain/paresthesias, BUE pain/paresthesias/numbness/weakness, chest pain/pressure, palpitations, dyspnea, RUQ abdominal pain, oliguria/anuria, BLE edema.    Lab Results   Component Value Date/Time    CHOLSTRLTOT 171 10/11/2018 09:35 AM     (H) 10/11/2018 09:35 AM    HDL 32 (A) 10/11/2018 09:35 AM    TRIGLYCERIDE 140 10/11/2018 09:35 AM

## 2018-10-22 NOTE — ASSESSMENT & PLAN NOTE
Chronic, stable, well-controlled at present time.  Patient currently asymptomatic.  However, due to patient's former CVA, he may not be as fully able to determine if he is having UTI symptoms.  Therefore, patient has been advised that he can have his urine checked between clinic visits, and that I can respond to those results as well as report from him on his symptoms.    ROS is NEGATIVE for fevers, chills, rigors, flank pain, dysuria, hematuria, pyuria, polyuria, increased frequency of urination, diarrhea, constipation.

## 2018-10-22 NOTE — ASSESSMENT & PLAN NOTE
Chronic, stable, well-controlled, taking medication as directed.     SI and HI are negative, patient not having adverse serotenergic s/sx.    No s/sx of serotonin syndrome: Agitation or restlessness, Confusion, Rapid heart rate and high blood pressure, Dilated pupils, Loss of muscle coordination or twitching muscles, Muscle rigidity, Heavy sweating, Diarrhea, Headache, Shivering, Goose bumps, High fever, Seizures, Irregular heartbeat, Unconsciousness.

## 2018-10-24 ENCOUNTER — NON-PROVIDER VISIT (OUTPATIENT)
Dept: WOUND CARE | Facility: MEDICAL CENTER | Age: 73
End: 2018-10-24
Attending: INTERNAL MEDICINE
Payer: MEDICARE

## 2018-10-24 ENCOUNTER — OFFICE VISIT (OUTPATIENT)
Dept: ENDOCRINOLOGY | Facility: MEDICAL CENTER | Age: 73
End: 2018-10-24
Payer: MEDICARE

## 2018-10-24 VITALS
SYSTOLIC BLOOD PRESSURE: 116 MMHG | BODY MASS INDEX: 27.22 KG/M2 | DIASTOLIC BLOOD PRESSURE: 68 MMHG | HEART RATE: 70 BPM | HEIGHT: 72 IN | OXYGEN SATURATION: 98 % | RESPIRATION RATE: 16 BRPM | WEIGHT: 201 LBS

## 2018-10-24 DIAGNOSIS — E78.2 MIXED HYPERLIPIDEMIA: ICD-10-CM

## 2018-10-24 DIAGNOSIS — I10 ESSENTIAL HYPERTENSION: ICD-10-CM

## 2018-10-24 DIAGNOSIS — Z79.4 TYPE 2 DIABETES MELLITUS WITHOUT COMPLICATION, WITH LONG-TERM CURRENT USE OF INSULIN (HCC): ICD-10-CM

## 2018-10-24 DIAGNOSIS — E11.9 TYPE 2 DIABETES MELLITUS WITHOUT COMPLICATION, WITH LONG-TERM CURRENT USE OF INSULIN (HCC): ICD-10-CM

## 2018-10-24 LAB
HBA1C MFR BLD: 5.9 % (ref ?–5.8)
INT CON NEG: NORMAL
INT CON POS: NORMAL

## 2018-10-24 PROCEDURE — 83036 HEMOGLOBIN GLYCOSYLATED A1C: CPT | Performed by: INTERNAL MEDICINE

## 2018-10-24 PROCEDURE — 97597 DBRDMT OPN WND 1ST 20 CM/<: CPT

## 2018-10-24 PROCEDURE — 99214 OFFICE O/P EST MOD 30 MIN: CPT | Performed by: INTERNAL MEDICINE

## 2018-10-24 NOTE — PROGRESS NOTES
Endocrinology Clinic Progress Note    CC: Diabetes    HPI:  Av Bob is a 72 y.o. old patient who comes in today for routine follow up.     Type 2 diabetes: He is currently on Toujeo 18 units at bedtime, Trulicity 1.5 mg weekly and Humalog 5-12 units with meals especially when he eats more carbs than usual.  He checks blood sugars 3-4 times a day.  Fasting blood sugars are mostly in the range of , pre-meal blood sugar readings are in the range of 100 260.  No hypoglycemia.    Hypertension: Blood pressure is well controlled. He reports compliance with medications. He is on ACE inhibitor.    Hyperlipidemia: He is currently on fenofibrate and Lipitor. Cholesterol levels are goal.    ROS:  Constitutional: No unintentional weight loss  Endo: Denies excessive thirst or frequent urination    PMH:  Type 2 diabetes  Hypertension  Hyperlipidemia    EXAM:  Vital signs: /68 (BP Location: Right arm, Patient Position: Sitting, BP Cuff Size: Adult)   Pulse 70   Resp 16   Ht 1.829 m (6')   Wt 91.2 kg (201 lb)   SpO2 98%   BMI 27.26 kg/m²   General: No apparent distress, cooperative  Eyes: No scleral icterus, no discharge  Neck: Normal on external inspection  Resp: Normal effort  Psych: Alert and oriented, normal mood and affect    Assessment and Plan:    1. Type 2 diabetes mellitus with hyperglycemia, with long-term current use of insulin ([facility])  · Hemoglobin A1c today in the clinic is 5.9%   · Goal hemoglobin A1c less than 7.5%  · Lowered Toujeo to 16 units at bedtime, and continue Humalog 5-12 units with meals   · Continue Trulicity 1.5 mg weekly  · Advised to continue checking blood sugars 4 times a day    2. Mixed hyperlipidemia  · Continue Lipitor and fenofibrate    3. Essential hypertension  · Blood pressure is well controlled    4. Diabetic peripheral neuropathy    Return in about 3 months (around 1/24/2019).    Thank you for allowing me to participate in the care of this  patient.    Trinidad Maguire M.D.    This note was created using voice recognition software (Dragon). The accuracy of the dictation is limited by the abilities of the software. I have reviewed the note prior to signing, however some errors in grammar and context are still possible. If you have any questions related to this note please do not hesitate to contact our office.

## 2018-10-24 NOTE — PROGRESS NOTES
RN-CDE Note    Subjective:     Health changes since last visit/interval Hx: None    Medications (including changes made today)  Current Outpatient Prescriptions   Medication Sig Dispense Refill   • magnesium oxide (MAG-OX) 400 MG Tab Take 1 Tab by mouth 3 times a day. 270 Tab 0   • atorvastatin (LIPITOR) 40 MG Tab Take 1 Tab by mouth every day. 90 Tab 0   • spironolactone (ALDACTONE) 50 MG Tab Take 1 Tab by mouth every day. 90 Tab 1   • fluoxetine (PROZAC) 40 MG capsule TAKE 1 CAPSULE BY MOUTH EVERY DAY 90 Cap 3   • allopurinol (ZYLOPRIM) 300 MG Tab TAKE 1/2 TABLET DAILY UNTIL KIDNEY LABS OK THEN TAKE 1 TABLET BY MOUTH EVERY DAY 90 Tab 1   • Dulaglutide (TRULICITY) 1.5 MG/0.5ML Solution Pen-injector Inject 1.5 mg as instructed every 7 days. 12 PEN 1   • metoprolol (TOPROL-XL) 200 MG XL tablet Take 1 Tab by mouth every evening. 90 Tab 3   • Insulin Pen Needle (BD PEN NEEDLE SWATI U/F) 32G X 4 MM Misc For insulin shots 5 times a day 450 Each 1   • HUMALOG KWIKPEN 100 UNIT/ML Solution Pen-injector injection INJECT 35 UNITS SUBCUTANEOUSLY 3 TIMES DAILY BEFORE MEALS 30 mL 6   • losartan (COZAAR) 50 MG Tab TAKE 1 TABLET BY MOUTH EVERY DAY 90 Tab 3   • Probiotic Product (PROBIOTIC DAILY PO) Take  by mouth.     • ascorbic acid (VITAMIN C) 500 MG tablet Take 500 mg by mouth every day.     • Cholecalciferol (VITAMIN D) 2000 units Cap Take 1 Cap by mouth every evening.     • NON SPECIFIED Apply 1 Application to affected area(s) 3 times a day as needed. Therawax     • Insulin Glargine (TOUJEO SOLOSTAR) 300 UNIT/ML Solution Pen-injector Inject 15 Units as instructed every evening. 3 PEN 6   • fenofibrate (TRICOR) 145 MG Tab Take 1 Tab by mouth every day. 90 Tab 3   • aspirin 81 MG tablet Take 81 mg by mouth every day.     • CENTRUM SILVER PO Take 1 Tab by mouth every day.     • clopidogrel (PLAVIX) 75 MG Tab Take 1 Tab by mouth every day. 90 Tab 2   • ONE TOUCH ULTRA TEST strip USE TO TEST UP TO FID  2   • Blood Glucose  "Monitoring Suppl Supplies Misc One Touch ultra glucometer strips for blood sugar check up to 5 times a day 450 Each 2   • Acetaminophen (TYLENOL PO) Take 1,250 mg by mouth 2 times a day as needed. 625 mg each tab       No current facility-administered medications for this visit.        Taking daily ASA: Yes  Taking above medications as prescribed: yes  SIDE EFFECTS: Patient denies side effects to medications    Exercise: sporadic irregular exercise, <half hour walking weekly  Diet: \"healthy\" diet  in general  Patient's body mass index is 27.26 kg/m². Exercise and nutrition counseling were performed at this visit.      Health Maintenance:   Health Maintenance Due   Topic Date Due   • IMM HEP B VACCINE (1 of 3 - Risk 3-dose series) 03/30/1966   • IMM PNEUMOCOCCAL 65+ (ADULT) HIGH/HIGHEST RISK SERIES (1 of 2 - PCV13) 03/30/2012   • IMM ZOSTER VACCINES (2 of 3) 11/23/2015   • IMM INFLUENZA (1) 09/01/2018       Immunizations:   PPSV23: Due  Abippav68: Due  Tdap: unknown  Flu: Due  Hep B: Due    DM:   Last A1c:   Lab Results   Component Value Date/Time    HBA1C 6.7 07/18/2018 01:00 PM      A1C GOAL: < 7    Glucose monitoring frequency: 3 times a day    Hypoglycemic episodes: no    Last Retinal Exam: on file and up-to-date  Daily Foot Exam: Yes   Routine Dental Exams: No    Lab Results   Component Value Date/Time    MALBCRT 142 (H) 06/06/2018 10:27 AM    MICROALBUR 11.9 06/06/2018 10:27 AM        ACR Albumin/Creatinine Ratio goal <30     HTN:   Blood pressure goal <140/<80 .   Currently Rx ACE/ARB: Yes    Dyslipidemia:    Lab Results   Component Value Date/Time    CHOLSTRLTOT 171 10/11/2018 09:35 AM     (H) 10/11/2018 09:35 AM    HDL 32 (A) 10/11/2018 09:35 AM    TRIGLYCERIDE 140 10/11/2018 09:35 AM       Lab Results   Component Value Date/Time    SODIUM 136 09/21/2018 01:11 PM    POTASSIUM 4.9 09/21/2018 01:11 PM    CHLORIDE 104 09/21/2018 01:11 PM    CO2 28 09/21/2018 01:11 PM    GLUCOSE 107 (H) 09/21/2018 " 01:11 PM    BUN 37 (H) 09/21/2018 01:11 PM    CREATININE 1.97 (H) 09/21/2018 01:11 PM    CREATININE 1.4 05/28/2008 05:42 PM    BUNCREATRAT 10 03/27/2017 02:11 PM     Lab Results   Component Value Date/Time    ALKPHOSPHAT 65 02/01/2018 02:53 PM    ASTSGOT 21 02/01/2018 02:53 PM    ALTSGPT 18 02/01/2018 02:53 PM    TBILIRUBIN 0.6 02/01/2018 02:53 PM        Currently Rx Statin: Yes    He  reports that he has never smoked. He has never used smokeless tobacco.    Objective:     Exam:  Monofilament: done    Plan:     Discussed and educated on:   - All medications, side effects and compliance (discussed carefully)  - Annual eye examinations at Ophthalmology  - Home glucose monitoring emphasized  - Insulin: when to contact HCP  - Weight control and daily exercise      Recommended medication changes: discuss toujeo doses

## 2018-10-25 NOTE — PROCEDURES
CSWD with forceps and scissors to remove ~3 cm2 non viable tissue from left medial ankle and leg wounds, and right posterior wounds.

## 2018-10-25 NOTE — PATIENT INSTRUCTIONS
Should you experience any significant changes in your wound(s) such as infection (redness, swelling, localized heat, increased pain, fever >101 F, chills) or have any questions regarding your home care instructions, please contact the wound center [phone]. If after hours, contact your primary care physician or go the hospital emergency room.  Keep dressing clean and dry and cover while bathing. Only change dressing if over saturated, soiled or its falling off.

## 2018-10-31 DIAGNOSIS — E78.2 MIXED HYPERLIPIDEMIA: ICD-10-CM

## 2018-10-31 RX ORDER — FENOFIBRATE 145 MG/1
145 TABLET, COATED ORAL DAILY
Qty: 90 TAB | Refills: 3 | Status: ON HOLD | OUTPATIENT
Start: 2018-10-31 | End: 2019-07-01

## 2018-11-06 NOTE — CERTIFICATION
Advanced Wound Care  [facility]  [address]  [address]  [place] [address]  [phone] Fax: [phone]     Initial Evaluation  For Certification Period: 09/26/2018 - 12/16/2018  Start of Care: 09/26/2018  Referring Physician: Trinidad Maguire MD  Primary Physician: Mitchell Pantoja MD          Wound(s): Left medial ankle; left posteromedial leg x 2; left anterior leg; right posterolateral leg       Subjective:       HPI: Patient is a 73 year old male with a history of cardiovascular disease; past CVA with L hemiparesis; and Type II DM with last A1c 6.7 on 7/18/18 who presents today with several crusted areas on his left leg and one on his right leg. Patient's wife has been caring for them at home and states that the left anterior leg and left posteromedial leg wounds have been present for approximately 2-3 months old (June-July 2018); the left medial ankle and right posterolateral leg wounds just began around the beginning of September. She was applying Betadine to the wounds but it started causing redness around wounds so she stopped and just washed with soap and water. She covers them with bandaids when needed. She reports little to no drainage coming from wounds.        Pain: Patient reports tenderness to his left leg wounds - 5-10 minute dwell time of viscous lidocaine              Objective:       Tests and Measures:  09/26/2018: Unable to perform PADMINI due to wound location. Right DP, PT doppler pulses are multiphasic and strong; pulses palpable at 2+. Difficult to doppler or palpate left foot pulses as patient has an involuntary spasm of his leg since the CVA. Was able to doppler PT pulse which was biphasic.       Patient had arterial studies in Feb 2018 which was reviewed by Dr. Terry and her impression is as follows: Mr. Bob presents after multiple medical problems and prolonged hospitalizations with imperfect perfusion through the tibial arteries. 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 It has not worsened with his wife diligent attention and I think is likely to heal with regular wound care. We can consider arterial intervention the wounds deteriorate or do not progress. However with normal perfusion down to the popliteal arteries and only left posterior tibial artery stenosis, the chance that substantial benefit from tibial atherectomy is small.              Orthotic, protective, supportive devices: none     Fall Risk Assessment (naomy all that apply with an X):  Completed 09/26/2018 pt is a high fall risk              x65 years or older                xFall within the last 2 years, uses   xAmbulatory devices  xLoss of protective sensation in feet,   Use of prostethic/orthotic, years               Presence of lower extremity/foot/toe amputation              xTaking medication that increases risk (per facility policy)     Interventions Recommended (if any of the above are selected):              Use of Assistive Device: Wheelchair, walker              Supervision with ambulation:  Caregiver              Assistance with ambulation:  Caregiver              Home safety education:  Educational material provided      Procedures:                Debridement: Non selective blunt debridement with NS and gauze. Unable to perform sharp debridement on the left leg due to patient's involuntary movements.               Cleansed with: NS to wounds                                                                                    Periwound protected with: Skin prep              Primary dressing: Honey colloid              Secondary Dressing: Ad foams               Other: Tubi D BLE     Patient Education: Pt instr increased protein diet, use of MVI and Arginaid supplementation (check if ok with PCP first); instr s/s infection, increased erythema and  edema/fever/chills/N+V when to call MD/go to ER. Instr rational for wound care products. Instr to make appts for 1 x week. Supplied spouse with the rest of the wound care supplies to take home. Instructed her how to change dressings and to do so once a week. Verbal teach back successful. Advised that I would place an order with [facility] for wound care supplies to be sent to their home as they do not have any dressing supplies. Instr to keep dressings clean and dry, shower on clinic days right before coming in. Wound care folder with written instr (including fall prevention) given to pt. Pt instr keep tight control over FBS, <140 for optimal wound healing; Implications of loss of protective sensation (LOPS) discussed with patient, including increased risk for amputation.  Advised to check feet at least daily, moisturize feet, and to always wear protective foot wear, arrange meticulous regular foot care by podiatrist or [facility]. Pt with good understanding.         Professional Collaboration: Initial evaluation faxed to referring provider      Assessment:       Wound etiology: Diabetic wounds complicated by elevated blood sugars and arterial insufficiency.      Wound Progress:  Initial Evaluation     Rationale for Treatment: Honey colloid to allow rapid epithelialization at this phase of wound healing, maintain moist wound bed, to lower pH for wound healing and to facilitate autolytic debridement;      Patient tolerance/compliance: Patient did not tolerate treatment well. I was unable to perform much debridement due to involuntary spasming of his left leg and he was tearful despite lidocaine use.      Complicating factors: Known arterial insufficiency; chronicity;      Need for ongoing Advanced Wound Care services:Patient requires skilled therapeutic wound care services for product selection, application of product, debridement, close monitoring with clinical assessment for expedite of wound healing.      Plan:        Treatment Plan and Recommendations:  Diagnosis/ICD10: E11.628 Diabetes with skin complication; E11.65, Z79.4 Type II DM with hyperglycemia, with long-term current use of insulin     Procedures/CPT: 54088     Frequency: 1x week     At the time of each visit a thorough assessment of the patient is completed to assure the  appropriateness of our plan of care.  The dressings or modalities may need to be adapted   from the original plan to address any significant changes in the wound environment.        Clinician Signature:_______________________________Date__________________        Physician Signature:______________________________Date:__________________

## 2018-11-06 NOTE — CERTIFICATION
Advanced Wound Care  [facility]  [address]  [address]  [place] [address]  [phone] Fax: [phone]     Initial Evaluation  For Certification Period: 09/26/2018 - 12/16/2018  Start of Care: 09/26/2018  Referring Physician: Trinidad Maguire MD  Primary Physician: Mitchell Pantoja MD          Wound(s): Left medial ankle; left posteromedial leg x 2; left anterior leg; right posterolateral leg       Subjective:       HPI: Patient is a 73 year old male with a history of cardiovascular disease; past CVA with L hemiparesis; and Type II DM with last A1c 6.7 on 7/18/18 who presents today with several crusted areas on his left leg and one on his right leg. Patient's wife has been caring for them at home and states that the left anterior leg and left posteromedial leg wounds have been present for approximately 2-3 months old (June-July 2018); the left medial ankle and right posterolateral leg wounds just began around the beginning of September. She was applying Betadine to the wounds but it started causing redness around wounds so she stopped and just washed with soap and water. She covers them with bandaids when needed. She reports little to no drainage coming from wounds.        Pain: Patient reports tenderness to his left leg wounds - 5-10 minute dwell time of viscous lidocaine              Objective:       Tests and Measures:  09/26/2018: Unable to perform PADMINI due to wound location. Right DP, PT doppler pulses are multiphasic and strong; pulses palpable at 2+. Difficult to doppler or palpate left foot pulses as patient has an involuntary spasm of his leg since the CVA. Was able to doppler PT pulse which was biphasic.       Patient had arterial studies in Feb 2018 which was reviewed by Dr. Terry and her impression is as follows: Mr. Bob presents after multiple medical problems and prolonged hospitalizations with imperfect perfusion through the tibial arteries. 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 It has not worsened with his wife diligent attention and I think is likely to heal with regular wound care. We can consider arterial intervention the wounds deteriorate or do not progress. However with normal perfusion down to the popliteal arteries and only left posterior tibial artery stenosis, the chance that substantial benefit from tibial atherectomy is small.              Orthotic, protective, supportive devices: none     Fall Risk Assessment (naomy all that apply with an X):  Completed 09/26/2018 pt is a high fall risk              x65 years or older                xFall within the last 2 years, uses   xAmbulatory devices  xLoss of protective sensation in feet,   Use of prostethic/orthotic, years               Presence of lower extremity/foot/toe amputation              xTaking medication that increases risk (per facility policy)     Interventions Recommended (if any of the above are selected):              Use of Assistive Device: Wheelchair, walker              Supervision with ambulation:  Caregiver              Assistance with ambulation:  Caregiver              Home safety education:  Educational material provided      Procedures:                Debridement: Non selective blunt debridement with NS and gauze. Unable to perform sharp debridement on the left leg due to patient's involuntary movements.               Cleansed with: NS to wounds                                                                                    Periwound protected with: Skin prep              Primary dressing: Honey colloid              Secondary Dressing: Ad foams               Other: Tubi D BLE     Patient Education: Pt instr increased protein diet, use of MVI and Arginaid supplementation (check if ok with PCP first); instr s/s infection, increased erythema and  edema/fever/chills/N+V when to call MD/go to ER. Instr rational for wound care products. Instr to make appts for 1 x week. Supplied spouse with the rest of the wound care supplies to take home. Instructed her how to change dressings and to do so once a week. Verbal teach back successful. Advised that I would place an order with [facility] for wound care supplies to be sent to their home as they do not have any dressing supplies. Instr to keep dressings clean and dry, shower on clinic days right before coming in. Wound care folder with written instr (including fall prevention) given to pt. Pt instr keep tight control over FBS, <140 for optimal wound healing; Implications of loss of protective sensation (LOPS) discussed with patient, including increased risk for amputation.  Advised to check feet at least daily, moisturize feet, and to always wear protective foot wear, arrange meticulous regular foot care by podiatrist or [facility]. Pt with good understanding.         Professional Collaboration: Initial evaluation faxed to referring provider      Assessment:       Wound etiology: Diabetic wounds complicated by elevated blood sugars and arterial insufficiency.      Wound Progress:  Initial Evaluation     Rationale for Treatment: Honey colloid to allow rapid epithelialization at this phase of wound healing, maintain moist wound bed, to lower pH for wound healing and to facilitate autolytic debridement;      Patient tolerance/compliance: Patient did not tolerate treatment well. I was unable to perform much debridement due to involuntary spasming of his left leg and he was tearful despite lidocaine use.      Complicating factors: Known arterial insufficiency; chronicity;      Need for ongoing Advanced Wound Care services:Patient requires skilled therapeutic wound care services for product selection, application of product, debridement, close monitoring with clinical assessment for expedite of wound healing.      Plan:        Treatment Plan and Recommendations:  Diagnosis/ICD10: E11.628 Diabetes with skin complication; E11.65, Z79.4 Type II DM with hyperglycemia, with long-term current use of insulin     Procedures/CPT: 98368     Frequency: 1x week     At the time of each visit a thorough assessment of the patient is completed to assure the  appropriateness of our plan of care.  The dressings or modalities may need to be adapted   from the original plan to address any significant changes in the wound environment.        Clinician Signature:_______________________________Date__________________        Physician Signature:______________________________Date:__________________             Non-Provider Visit on 9/26/2018            Signed Note

## 2018-11-08 ENCOUNTER — NON-PROVIDER VISIT (OUTPATIENT)
Dept: WOUND CARE | Facility: MEDICAL CENTER | Age: 73
End: 2018-11-08
Attending: INTERNAL MEDICINE
Payer: MEDICARE

## 2018-11-08 ENCOUNTER — HOSPITAL ENCOUNTER (OUTPATIENT)
Facility: MEDICAL CENTER | Age: 73
End: 2018-11-08
Attending: NURSE PRACTITIONER
Payer: MEDICARE

## 2018-11-08 DIAGNOSIS — S81.802D OPEN WOUND OF LEFT LOWER LEG, SUBSEQUENT ENCOUNTER: ICD-10-CM

## 2018-11-08 LAB
GRAM STN SPEC: NORMAL
SIGNIFICANT IND 70042: NORMAL
SITE SITE: NORMAL
SOURCE SOURCE: NORMAL

## 2018-11-08 PROCEDURE — 87070 CULTURE OTHR SPECIMN AEROBIC: CPT

## 2018-11-08 PROCEDURE — 97602 WOUND(S) CARE NON-SELECTIVE: CPT

## 2018-11-08 PROCEDURE — 87205 SMEAR GRAM STAIN: CPT

## 2018-11-08 NOTE — PROCEDURES
Non-selective blunt debridement with NS moisten gauze and cotton tip applicator to cleanse wounds and apply betadine.

## 2018-11-08 NOTE — PATIENT INSTRUCTIONS
-Keep your wound dressing clean, dry, and intact.    -Change your dressing if it becomes soiled, soaked, or falls off.    -Should you experience any significant changes in your wound(s), such as infection (redness, swelling, localized heat, increased pain, fever > 101 F, chills) or have any questions regarding your home care instructions, please contact the wound center at [phone]. If after hours, contact your primary care physician or go to the hospital emergency room.

## 2018-11-08 NOTE — PROGRESS NOTES
Patient states more warmth and pain in his left LE, no fever or chills.  More drainage from posterior wound.  Non-blanching erythema to posterior wound site.  C&S taken of left posterior wound.

## 2018-11-10 LAB
BACTERIA WND AEROBE CULT: NORMAL
GRAM STN SPEC: NORMAL
SIGNIFICANT IND 70042: NORMAL
SITE SITE: NORMAL
SOURCE SOURCE: NORMAL

## 2018-11-19 ENCOUNTER — HOSPITAL ENCOUNTER (OUTPATIENT)
Dept: LAB | Facility: MEDICAL CENTER | Age: 73
End: 2018-11-19
Attending: INTERNAL MEDICINE
Payer: MEDICARE

## 2018-11-19 DIAGNOSIS — N18.30 CKD (CHRONIC KIDNEY DISEASE) STAGE 3, GFR 30-59 ML/MIN (HCC): ICD-10-CM

## 2018-11-19 PROCEDURE — 36415 COLL VENOUS BLD VENIPUNCTURE: CPT

## 2018-11-19 PROCEDURE — 80048 BASIC METABOLIC PNL TOTAL CA: CPT

## 2018-11-20 ENCOUNTER — HOSPITAL ENCOUNTER (OUTPATIENT)
Facility: MEDICAL CENTER | Age: 73
End: 2018-11-21
Attending: EMERGENCY MEDICINE | Admitting: HOSPITALIST
Payer: MEDICARE

## 2018-11-20 ENCOUNTER — APPOINTMENT (OUTPATIENT)
Dept: RADIOLOGY | Facility: MEDICAL CENTER | Age: 73
End: 2018-11-20
Attending: EMERGENCY MEDICINE
Payer: MEDICARE

## 2018-11-20 ENCOUNTER — APPOINTMENT (OUTPATIENT)
Dept: CARDIOLOGY | Facility: MEDICAL CENTER | Age: 73
End: 2018-11-20
Attending: HOSPITALIST
Payer: MEDICARE

## 2018-11-20 ENCOUNTER — APPOINTMENT (OUTPATIENT)
Dept: WOUND CARE | Facility: MEDICAL CENTER | Age: 73
End: 2018-11-20
Attending: INTERNAL MEDICINE
Payer: MEDICARE

## 2018-11-20 DIAGNOSIS — R41.82 ALTERED MENTAL STATUS, UNSPECIFIED ALTERED MENTAL STATUS TYPE: Primary | ICD-10-CM

## 2018-11-20 DIAGNOSIS — R53.81 MALAISE: ICD-10-CM

## 2018-11-20 PROBLEM — G45.9 TIA (TRANSIENT ISCHEMIC ATTACK): Status: ACTIVE | Noted: 2018-11-20

## 2018-11-20 LAB
ALBUMIN SERPL BCP-MCNC: 3.8 G/DL (ref 3.2–4.9)
ALBUMIN/GLOB SERPL: 1.7 G/DL
ALP SERPL-CCNC: 53 U/L (ref 30–99)
ALT SERPL-CCNC: 20 U/L (ref 2–50)
ANION GAP SERPL CALC-SCNC: 13 MMOL/L (ref 0–11.9)
ANION GAP SERPL CALC-SCNC: 7 MMOL/L (ref 0–11.9)
APTT PPP: 24.1 SEC (ref 24.7–36)
AST SERPL-CCNC: 18 U/L (ref 12–45)
BASOPHILS # BLD AUTO: 0.5 % (ref 0–1.8)
BASOPHILS # BLD: 0.05 K/UL (ref 0–0.12)
BILIRUB SERPL-MCNC: 0.7 MG/DL (ref 0.1–1.5)
BUN SERPL-MCNC: 40 MG/DL (ref 8–22)
BUN SERPL-MCNC: 41 MG/DL (ref 8–22)
CALCIUM SERPL-MCNC: 9.6 MG/DL (ref 8.5–10.5)
CALCIUM SERPL-MCNC: 9.8 MG/DL (ref 8.5–10.5)
CHLORIDE SERPL-SCNC: 102 MMOL/L (ref 96–112)
CHLORIDE SERPL-SCNC: 103 MMOL/L (ref 96–112)
CO2 SERPL-SCNC: 22 MMOL/L (ref 20–33)
CO2 SERPL-SCNC: 27 MMOL/L (ref 20–33)
CREAT SERPL-MCNC: 1.99 MG/DL (ref 0.5–1.4)
CREAT SERPL-MCNC: 2.19 MG/DL (ref 0.5–1.4)
EKG IMPRESSION: NORMAL
EOSINOPHIL # BLD AUTO: 0.29 K/UL (ref 0–0.51)
EOSINOPHIL NFR BLD: 2.9 % (ref 0–6.9)
ERYTHROCYTE [DISTWIDTH] IN BLOOD BY AUTOMATED COUNT: 47.5 FL (ref 35.9–50)
GLOBULIN SER CALC-MCNC: 2.2 G/DL (ref 1.9–3.5)
GLUCOSE BLD-MCNC: 112 MG/DL (ref 65–99)
GLUCOSE SERPL-MCNC: 161 MG/DL (ref 65–99)
GLUCOSE SERPL-MCNC: 184 MG/DL (ref 65–99)
HCT VFR BLD AUTO: 42.5 % (ref 42–52)
HGB BLD-MCNC: 14.5 G/DL (ref 14–18)
IMM GRANULOCYTES # BLD AUTO: 0.09 K/UL (ref 0–0.11)
IMM GRANULOCYTES NFR BLD AUTO: 0.9 % (ref 0–0.9)
INR PPP: 1.08 (ref 0.87–1.13)
LACTATE BLD-SCNC: 1.6 MMOL/L (ref 0.5–2)
LYMPHOCYTES # BLD AUTO: 0.94 K/UL (ref 1–4.8)
LYMPHOCYTES NFR BLD: 9.4 % (ref 22–41)
MAGNESIUM SERPL-MCNC: 1.6 MG/DL (ref 1.5–2.5)
MCH RBC QN AUTO: 30.8 PG (ref 27–33)
MCHC RBC AUTO-ENTMCNC: 34.1 G/DL (ref 33.7–35.3)
MCV RBC AUTO: 90.2 FL (ref 81.4–97.8)
MONOCYTES # BLD AUTO: 0.63 K/UL (ref 0–0.85)
MONOCYTES NFR BLD AUTO: 6.3 % (ref 0–13.4)
NEUTROPHILS # BLD AUTO: 7.98 K/UL (ref 1.82–7.42)
NEUTROPHILS NFR BLD: 80 % (ref 44–72)
NRBC # BLD AUTO: 0 K/UL
NRBC BLD-RTO: 0 /100 WBC
PLATELET # BLD AUTO: 314 K/UL (ref 164–446)
PMV BLD AUTO: 9.9 FL (ref 9–12.9)
POTASSIUM SERPL-SCNC: 4.2 MMOL/L (ref 3.6–5.5)
POTASSIUM SERPL-SCNC: 4.2 MMOL/L (ref 3.6–5.5)
PROT SERPL-MCNC: 6 G/DL (ref 6–8.2)
PROTHROMBIN TIME: 14.1 SEC (ref 12–14.6)
RBC # BLD AUTO: 4.71 M/UL (ref 4.7–6.1)
SODIUM SERPL-SCNC: 137 MMOL/L (ref 135–145)
SODIUM SERPL-SCNC: 137 MMOL/L (ref 135–145)
TROPONIN I SERPL-MCNC: 0.01 NG/ML (ref 0–0.04)
TROPONIN I SERPL-MCNC: 0.02 NG/ML (ref 0–0.04)
TSH SERPL DL<=0.005 MIU/L-ACNC: 2.25 UIU/ML (ref 0.38–5.33)
WBC # BLD AUTO: 10 K/UL (ref 4.8–10.8)

## 2018-11-20 PROCEDURE — A9270 NON-COVERED ITEM OR SERVICE: HCPCS | Performed by: HOSPITALIST

## 2018-11-20 PROCEDURE — 83735 ASSAY OF MAGNESIUM: CPT

## 2018-11-20 PROCEDURE — 87086 URINE CULTURE/COLONY COUNT: CPT

## 2018-11-20 PROCEDURE — 80307 DRUG TEST PRSMV CHEM ANLYZR: CPT

## 2018-11-20 PROCEDURE — 81001 URINALYSIS AUTO W/SCOPE: CPT

## 2018-11-20 PROCEDURE — 93005 ELECTROCARDIOGRAM TRACING: CPT | Performed by: EMERGENCY MEDICINE

## 2018-11-20 PROCEDURE — 83605 ASSAY OF LACTIC ACID: CPT

## 2018-11-20 PROCEDURE — 99220 PR INITIAL OBSERVATION CARE,LEVL III: CPT | Performed by: HOSPITALIST

## 2018-11-20 PROCEDURE — 85025 COMPLETE CBC W/AUTO DIFF WBC: CPT

## 2018-11-20 PROCEDURE — 87186 SC STD MICRODIL/AGAR DIL: CPT

## 2018-11-20 PROCEDURE — 93306 TTE W/DOPPLER COMPLETE: CPT

## 2018-11-20 PROCEDURE — 700105 HCHG RX REV CODE 258: Performed by: EMERGENCY MEDICINE

## 2018-11-20 PROCEDURE — 96372 THER/PROPH/DIAG INJ SC/IM: CPT

## 2018-11-20 PROCEDURE — 84443 ASSAY THYROID STIM HORMONE: CPT

## 2018-11-20 PROCEDURE — G0378 HOSPITAL OBSERVATION PER HR: HCPCS

## 2018-11-20 PROCEDURE — 99285 EMERGENCY DEPT VISIT HI MDM: CPT

## 2018-11-20 PROCEDURE — 87077 CULTURE AEROBIC IDENTIFY: CPT

## 2018-11-20 PROCEDURE — 70450 CT HEAD/BRAIN W/O DYE: CPT

## 2018-11-20 PROCEDURE — 82962 GLUCOSE BLOOD TEST: CPT

## 2018-11-20 PROCEDURE — 71045 X-RAY EXAM CHEST 1 VIEW: CPT

## 2018-11-20 PROCEDURE — 700102 HCHG RX REV CODE 250 W/ 637 OVERRIDE(OP): Performed by: HOSPITALIST

## 2018-11-20 PROCEDURE — 36415 COLL VENOUS BLD VENIPUNCTURE: CPT

## 2018-11-20 PROCEDURE — 700105 HCHG RX REV CODE 258: Performed by: HOSPITALIST

## 2018-11-20 PROCEDURE — 85730 THROMBOPLASTIN TIME PARTIAL: CPT

## 2018-11-20 PROCEDURE — 85610 PROTHROMBIN TIME: CPT

## 2018-11-20 PROCEDURE — 84484 ASSAY OF TROPONIN QUANT: CPT

## 2018-11-20 PROCEDURE — 80053 COMPREHEN METABOLIC PANEL: CPT

## 2018-11-20 PROCEDURE — 83036 HEMOGLOBIN GLYCOSYLATED A1C: CPT

## 2018-11-20 PROCEDURE — 93306 TTE W/DOPPLER COMPLETE: CPT | Mod: 26 | Performed by: INTERNAL MEDICINE

## 2018-11-20 RX ORDER — ATORVASTATIN CALCIUM 40 MG/1
40 TABLET, FILM COATED ORAL
Status: DISCONTINUED | OUTPATIENT
Start: 2018-11-20 | End: 2018-11-21 | Stop reason: HOSPADM

## 2018-11-20 RX ORDER — SODIUM CHLORIDE 9 MG/ML
250 INJECTION, SOLUTION INTRAVENOUS ONCE
Status: COMPLETED | OUTPATIENT
Start: 2018-11-20 | End: 2018-11-20

## 2018-11-20 RX ORDER — INSULIN GLARGINE 100 [IU]/ML
16 INJECTION, SOLUTION SUBCUTANEOUS
Status: DISCONTINUED | OUTPATIENT
Start: 2018-11-20 | End: 2018-11-21 | Stop reason: HOSPADM

## 2018-11-20 RX ORDER — AMOXICILLIN 250 MG
2 CAPSULE ORAL 2 TIMES DAILY
Status: DISCONTINUED | OUTPATIENT
Start: 2018-11-20 | End: 2018-11-21 | Stop reason: HOSPADM

## 2018-11-20 RX ORDER — SPIRONOLACTONE 50 MG/1
50 TABLET, FILM COATED ORAL DAILY
Status: DISCONTINUED | OUTPATIENT
Start: 2018-11-21 | End: 2018-11-21 | Stop reason: HOSPADM

## 2018-11-20 RX ORDER — ALLOPURINOL 100 MG/1
100 TABLET ORAL EVERY EVENING
Status: DISCONTINUED | OUTPATIENT
Start: 2018-11-20 | End: 2018-11-21 | Stop reason: HOSPADM

## 2018-11-20 RX ORDER — TAMSULOSIN HYDROCHLORIDE 0.4 MG/1
0.4 CAPSULE ORAL EVERY EVENING
COMMUNITY
End: 2019-04-16

## 2018-11-20 RX ORDER — INSULIN GLARGINE 100 [IU]/ML
0.2 INJECTION, SOLUTION SUBCUTANEOUS EVERY EVENING
Status: DISCONTINUED | OUTPATIENT
Start: 2018-11-20 | End: 2018-11-20

## 2018-11-20 RX ORDER — FLUOXETINE HYDROCHLORIDE 20 MG/1
40 CAPSULE ORAL DAILY
Status: DISCONTINUED | OUTPATIENT
Start: 2018-11-21 | End: 2018-11-21 | Stop reason: HOSPADM

## 2018-11-20 RX ORDER — POLYETHYLENE GLYCOL 3350 17 G/17G
1 POWDER, FOR SOLUTION ORAL
Status: DISCONTINUED | OUTPATIENT
Start: 2018-11-20 | End: 2018-11-21 | Stop reason: HOSPADM

## 2018-11-20 RX ORDER — DEXTROSE MONOHYDRATE 25 G/50ML
25 INJECTION, SOLUTION INTRAVENOUS
Status: DISCONTINUED | OUTPATIENT
Start: 2018-11-20 | End: 2018-11-21 | Stop reason: HOSPADM

## 2018-11-20 RX ORDER — FENOFIBRATE 134 MG/1
134 CAPSULE ORAL DAILY
Status: DISCONTINUED | OUTPATIENT
Start: 2018-11-20 | End: 2018-11-21 | Stop reason: HOSPADM

## 2018-11-20 RX ORDER — ACETAMINOPHEN 325 MG/1
650 TABLET ORAL EVERY 6 HOURS PRN
Status: DISCONTINUED | OUTPATIENT
Start: 2018-11-20 | End: 2018-11-21 | Stop reason: HOSPADM

## 2018-11-20 RX ORDER — BISACODYL 10 MG
10 SUPPOSITORY, RECTAL RECTAL
Status: DISCONTINUED | OUTPATIENT
Start: 2018-11-20 | End: 2018-11-21 | Stop reason: HOSPADM

## 2018-11-20 RX ORDER — ONDANSETRON 2 MG/ML
4 INJECTION INTRAMUSCULAR; INTRAVENOUS EVERY 4 HOURS PRN
Status: DISCONTINUED | OUTPATIENT
Start: 2018-11-20 | End: 2018-11-21 | Stop reason: HOSPADM

## 2018-11-20 RX ORDER — ONDANSETRON 4 MG/1
4 TABLET, ORALLY DISINTEGRATING ORAL EVERY 4 HOURS PRN
Status: DISCONTINUED | OUTPATIENT
Start: 2018-11-20 | End: 2018-11-21 | Stop reason: HOSPADM

## 2018-11-20 RX ORDER — FLUOXETINE HYDROCHLORIDE 40 MG/1
40 CAPSULE ORAL
Status: DISCONTINUED | OUTPATIENT
Start: 2018-11-21 | End: 2018-11-20

## 2018-11-20 RX ORDER — SODIUM CHLORIDE 9 MG/ML
INJECTION, SOLUTION INTRAVENOUS CONTINUOUS
Status: ACTIVE | OUTPATIENT
Start: 2018-11-20 | End: 2018-11-21

## 2018-11-20 RX ORDER — ACETAMINOPHEN 500 MG
1000 TABLET ORAL EVERY 6 HOURS PRN
Status: SHIPPED | COMMUNITY
End: 2022-01-19

## 2018-11-20 RX ORDER — LOSARTAN POTASSIUM 50 MG/1
50 TABLET ORAL
Status: DISCONTINUED | OUTPATIENT
Start: 2018-11-21 | End: 2018-11-21 | Stop reason: HOSPADM

## 2018-11-20 RX ORDER — TAMSULOSIN HYDROCHLORIDE 0.4 MG/1
0.4 CAPSULE ORAL EVERY EVENING
Status: DISCONTINUED | OUTPATIENT
Start: 2018-11-20 | End: 2018-11-21 | Stop reason: HOSPADM

## 2018-11-20 RX ORDER — METOPROLOL SUCCINATE 100 MG/1
200 TABLET, EXTENDED RELEASE ORAL EVERY EVENING
Status: DISCONTINUED | OUTPATIENT
Start: 2018-11-20 | End: 2018-11-21 | Stop reason: HOSPADM

## 2018-11-20 RX ORDER — FENOFIBRATE 145 MG/1
145 TABLET, COATED ORAL DAILY
Status: DISCONTINUED | OUTPATIENT
Start: 2018-11-20 | End: 2018-11-20

## 2018-11-20 RX ORDER — ALLOPURINOL 300 MG/1
150 TABLET ORAL EVERY EVENING
COMMUNITY
End: 2019-07-09

## 2018-11-20 RX ORDER — CLOPIDOGREL BISULFATE 75 MG/1
75 TABLET ORAL DAILY
Status: DISCONTINUED | OUTPATIENT
Start: 2018-11-20 | End: 2018-11-21 | Stop reason: HOSPADM

## 2018-11-20 RX ADMIN — CLOPIDOGREL 75 MG: 75 TABLET, FILM COATED ORAL at 19:10

## 2018-11-20 RX ADMIN — ATORVASTATIN CALCIUM 40 MG: 40 TABLET, FILM COATED ORAL at 19:10

## 2018-11-20 RX ADMIN — INSULIN LISPRO 6 UNITS: 100 INJECTION, SOLUTION INTRAVENOUS; SUBCUTANEOUS at 19:29

## 2018-11-20 RX ADMIN — ALLOPURINOL 100 MG: 100 TABLET ORAL at 19:26

## 2018-11-20 RX ADMIN — METOPROLOL SUCCINATE 200 MG: 100 TABLET, FILM COATED, EXTENDED RELEASE ORAL at 19:26

## 2018-11-20 RX ADMIN — TAMSULOSIN HYDROCHLORIDE 0.4 MG: 0.4 CAPSULE ORAL at 19:26

## 2018-11-20 RX ADMIN — SODIUM CHLORIDE 250 ML: 9 INJECTION, SOLUTION INTRAVENOUS at 14:43

## 2018-11-20 RX ADMIN — SODIUM CHLORIDE: 9 INJECTION, SOLUTION INTRAVENOUS at 19:28

## 2018-11-20 RX ADMIN — FENOFIBRATE 134 MG: 134 CAPSULE ORAL at 19:31

## 2018-11-20 ASSESSMENT — ENCOUNTER SYMPTOMS
GASTROINTESTINAL NEGATIVE: 1
DIAPHORESIS: 0
DIARRHEA: 0
MUSCULOSKELETAL NEGATIVE: 1
BLOOD IN STOOL: 0
RESPIRATORY NEGATIVE: 1
WHEEZING: 0
VOMITING: 0
LOSS OF CONSCIOUSNESS: 0
PALPITATIONS: 0
BRUISES/BLEEDS EASILY: 0
SEIZURES: 0
NEUROLOGICAL NEGATIVE: 1
EYES NEGATIVE: 1
ABDOMINAL PAIN: 0
PSYCHIATRIC NEGATIVE: 1
NAUSEA: 0
COUGH: 0
DIZZINESS: 0
CHILLS: 0
NERVOUS/ANXIOUS: 0
DOUBLE VISION: 0
CARDIOVASCULAR NEGATIVE: 1
HEMOPTYSIS: 0
HEADACHES: 0
FOCAL WEAKNESS: 0
CONSTIPATION: 0
HEARTBURN: 0
FEVER: 0

## 2018-11-20 ASSESSMENT — PAIN SCALES - GENERAL
PAINLEVEL_OUTOF10: 0
PAINLEVEL_OUTOF10: 0

## 2018-11-20 ASSESSMENT — LIFESTYLE VARIABLES
EVER_SMOKED: NEVER
ALCOHOL_USE: NO

## 2018-11-20 NOTE — ED PROVIDER NOTES
"ED Provider Note    CHIEF COMPLAINT  Chief Complaint   Patient presents with   • Weakness   • Tired     Pt was unable to complete his PT today    • ALOC     pt wife, \"he is out of it\" states he isn't acting normal. FSBS        HPI  Av Bob is a 73 y.o. male who presents Complaining of profound weakness.  The patient seem to be fine last night and earlier this morning.  However he suddenly became very tired, very out of it.  He describes as being profoundly weak.  He is just not acting normally.  His wife cannot quite put a finger on it.  The patient for his part as well has no particular symptoms.  He has been a runny nose which is usual for him.  His been no cough or cold symptoms at all.  He denies any headache.  There is no new weakness.  He has ongoing weakness on the left side after a stroke.  There is no vision or speech changes.  He denies any recent trauma or other illness.  No belly pain.  No change in bowel or bladder.  He did start some Flomax, this was about a week ago, has had several doses in the interim.  Otherwise no medication changes.  His wife checked his blood sugar was in the 190s.  There is no other complaint.    PAST MEDICAL HISTORY  Past Medical History:   Diagnosis Date   • Acute respiratory failure with hypoxia ([facility]) 5/20/2017   • CAD (coronary artery disease)     GIOVANNA to RCA in '97, GIOVANNA X2 to LAD and GIOVANNA X2 to OM in '09   • Cancer (Formerly [facility])     2017; chemo lympoma   • Cataract    • Cerebrovascular accident (CVA) ([facility]) 12/30/2016    Left arm weakness  etiology of stroke not established, lymphoma discovered on MRI evaluation of stroke, L hemiparesis much worse after acute infectious illness in mid 2017, but no specific diagnosed recurrent neurological etiology, all at [facility]   • CKD (chronic kidney disease) stage 3, GFR 30-59 ml/min ([facility])    • Controlled gout 2014   • Coronary atherosclerosis of native coronary artery     S/P PTCA " (percutaneous transluminal coronary angioplasty), RCA, 5/1997, patent on cath 7/10/2009 at the time of interventions on his left anterior descending and circumflex coronary arteries   • Depression    • Diabetes ([facility])    • Enterococcal septicemia ([facility]) 8/12/2017   • Hypertension    • Hypokalemia 2012    controlled with combination of ACE inhibitor or ARB plus spironolactone   • Hypomagnesemia 08/12/2017    etiology uncertain   • Lymphoma ([facility]) 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 Barry   • NSTEMI (non-ST elevated myocardial infarction) ([facility]) 07/18/2017    complicating UTI with sepsis   • Pain    • Polyneuropathy in diabetes(357.2) 9/11/2013   • Septic shock ([facility]) 5/20/2017   • Skin ulcer of calf ([facility]) 2015    Right, Dr. Terry and wound care   • Stroke ([facility]) 2016    left sided weakness   • Urinary bladder disorder    • Urinary incontinence    • Wound of left leg 2012    Requiring surgery and debridment, Dr. Moore       FAMILY HISTORY  Family History   Problem Relation Age of Onset   • Heart Disease Father         CAD   • Diabetes Father    • Cancer Mother    • Psychiatry Mother         Depression   • Depression Mother    • Kidney stones Brother    • Heart Disease Brother    • Psychiatry Brother         Depression   • Depression Brother    • Suicide Attempts Other    • Psychiatry Other         autism       SOCIAL HISTORY  Social History   Substance Use Topics   • Smoking status: Never Smoker   • Smokeless tobacco: Never Used   • Alcohol use No         SURGICAL HISTORY  Past Surgical History:   Procedure Laterality Date   • CYSTOSCOPY STENT PLACEMENT Left 2/12/2018    Procedure: CYSTOSCOPY  ;  Surgeon: Rey Barry M.D.;  Location: SURGERY [facility];  Service: Urology   • URETEROSCOPY Left 2/12/2018    Procedure: URETEROSCOPY- FLEXIBLE  ;  Surgeon: Rey Barry M.D.;  Location: SURGERY [facility];  Service: Urology   •  LASERTRIPSY Left 2/12/2018    Procedure: LASERTRIPSY - LITHO  ;  Surgeon: Rey Barry M.D.;  Location: SURGERY [facility];  Service: Urology   • WOUND CLOSURE GENERAL  4/3/2012    Performed by GERHARD GILBERT at SURGERY SAME DAY [facility] ORS   • CARDIAC CATH  2009    Stents to LAD, Om   • DAGOBERTO BY LAPAROSCOPY  1998   • ANGIOPLASTY  1997    RCA followed by other stents as noted above.    • CARDIAC CATH  1997    stent RCA   • CATARACT EXTRACTION WITH IOL      bilateral   • LITHOTRIPSY     • TONSILLECTOMY AND ADENOIDECTOMY         CURRENT MEDICATIONS  Home Medications     Reviewed by Irene Alvarez R.N. (Registered Nurse) on 11/20/18 at 1214  Med List Status: Partial   Medication Last Dose Status   Acetaminophen (TYLENOL PO) PRN Active   allopurinol (ZYLOPRIM) 300 MG Tab Taking Active   ascorbic acid (VITAMIN C) 500 MG tablet Taking Active   aspirin 81 MG tablet 11/20/2018 Active   atorvastatin (LIPITOR) 40 MG Tab 11/19/2018 Active   Blood Glucose Monitoring Suppl Supplies Misc 11/20/2018 Active   CENTRUM SILVER PO 11/20/2018 Active   Cholecalciferol (VITAMIN D) 2000 units Cap 11/20/2018 Active   clopidogrel (PLAVIX) 75 MG Tab 11/19/2018 Active   Dulaglutide (TRULICITY) 1.5 MG/0.5ML Solution Pen-injector Taking Active   fenofibrate (TRICOR) 145 MG Tab  Active   fluoxetine (PROZAC) 40 MG capsule 11/20/2018 Active   HUMALOG KWIKPEN 100 UNIT/ML Solution Pen-injector injection 11/20/2018 Active   Insulin Glargine (TOUJEO SOLOSTAR) 300 UNIT/ML Solution Pen-injector 11/20/2018 Active   Insulin Pen Needle (BD PEN NEEDLE SWATI U/F) 32G X 4 MM Misc Taking Active   losartan (COZAAR) 50 MG Tab 11/19/2018 Active   magnesium oxide (MAG-OX) 400 MG Tab Taking Active   metoprolol (TOPROL-XL) 200 MG XL tablet 11/19/2018 Active   NON SPECIFIED Taking Active   ONE TOUCH ULTRA TEST strip Taking Active   Probiotic Product (PROBIOTIC DAILY PO) Taking Active   spironolactone (ALDACTONE) 50 MG Tab Taking Active                I have  reviewed the nurses notes and/or the list brought with the patient.    ALLERGIES  Allergies   Allergen Reactions   • Diphenhydramine Hcl Anxiety     Pt is able to tolerate  Mg benadryl with less anxiety   • Lorazepam Unspecified     Disorientation   • Ciprofloxacin      Rash,stomach ache       REVIEW OF SYSTEMS  See HPI for further details. Review of systems as above, otherwise all other systems are negative.     PHYSICAL EXAM  VITAL SIGNS: BP (!) 99/57   Pulse 69   Temp 36.1 °C (97 °F) (Temporal)   Resp 16   Ht 1.829 m (6')   Wt 90.7 kg (200 lb)   SpO2 98%   BMI 27.12 kg/m²     Constitutional: Although he appears very tired, he is in no distress.  Initial blood pressure was noted.  In the room, it is 98 systolic when I first see him.  HENT: Mucus membranes moist.  Oropharynx is clear.  Eyes: Pupils equally round.  No scleral icterus.   Neck: Full nontender range of motion.  Lymphatic: No cervical lymphadenopathy noted.   Cardiovascular: Regular heart rate and rhythm.  No murmurs, rubs, nor gallop appreciated.   Thorax & Lungs: Chest is nontender.  Lungs are clear to auscultation with good air movement bilaterally.  No wheeze, rhonchi, nor rales.   Abdomen: Soft, with no tenderness, rebound nor guarding.  No mass, pulsatile mass, nor hepatosplenomegaly appreciated.  Skin: No purpura nor petechia noted.  Extremities/Musculoskeletal: No sign of trauma.  Calves are nontender with no cords nor edema.  No Roya's sign.  Pulses are intact all around.   Neurologic: Alert & oriented.  Strength and sensation is intact all around, although significantly decreased on the left upper extremity and less of the left lower extremity, this is baseline apparently.  Gait is not assessed cranial nerves are normal.  Psychiatric: Normal affect appropriate for the clinical situation.    EKG  I interpreted this EKG myself.  This is a 12-lead study.  The rhythm is sinus with a rate of 60.  There are no ST segment nor T wave  abnormalities.  Interpretation: No ST segment elevation myocardial infarction.  No change from prior tracing.    LABS  Labs Reviewed   CBC WITH DIFFERENTIAL - Abnormal; Notable for the following:        Result Value    Neutrophils-Polys 80.00 (*)     Lymphocytes 9.40 (*)     Neutrophils (Absolute) 7.98 (*)     Lymphs (Absolute) 0.94 (*)     All other components within normal limits    Narrative:     Indicate which anticoagulants the patient is on:->NONE   COMP METABOLIC PANEL - Abnormal; Notable for the following:     Glucose 161 (*)     Bun 41 (*)     Creatinine 2.19 (*)     All other components within normal limits    Narrative:     Indicate which anticoagulants the patient is on:->NONE   APTT - Abnormal; Notable for the following:     APTT 24.1 (*)     All other components within normal limits    Narrative:     Indicate which anticoagulants the patient is on:->NONE   ESTIMATED GFR - Abnormal; Notable for the following:     GFR If  36 (*)     GFR If Non  30 (*)     All other components within normal limits    Narrative:     Indicate which anticoagulants the patient is on:->NONE   PROTHROMBIN TIME    Narrative:     Indicate which anticoagulants the patient is on:->NONE   TROPONIN    Narrative:     Indicate which anticoagulants the patient is on:->NONE   LACTIC ACID    Narrative:     Indicate which anticoagulants the patient is on:->NONE   TSH    Narrative:     Indicate which anticoagulants the patient is on:->NONE   URINALYSIS,CULTURE IF INDICATED   HEMOGLOBIN A1C   URINE DRUG SCREEN   TROPONIN   MAGNESIUM         RADIOLOGY/PROCEDURES  I have reviewed the patient's film interpretations myself, and they are read out by the radiologist as:   CT-HEAD W/O   Final Result      1.  Cerebral atrophy.      2.  White matter lucencies most consistent with small vessel ischemic change versus demyelination or gliosis.      3.  Otherwise, Head CT without contrast with no acute findings. No  evidence of acute cerebral  hemorrhage or mass lesion.      DX-CHEST-PORTABLE (1 VIEW)   Final Result      No acute cardiopulmonary process is seen.      Atherosclerotic plaque.         EC-ECHOCARDIOGRAM COMPLETE W/O CONT    (Results Pending)   US-CAROTID DOPPLER BILAT    (Results Pending)   MR-BRAIN-W/O    (Results Pending)     .    MEDICAL RECORD  I have reviewed patient's medical record and pertinent results are listed above.    COURSE & MEDICAL DECISION MAKING  I have reviewed any medical record information, laboratory studies and radiographic results as noted above.  Patient presents with an alteration of his mental status, , with generalized malaise.  The cause of this is not clear.  His stroke score is a 0 and he is not a candidate for TPA/alteplase/thrombolytics.    IVF are given as the patient has a low initial blood pressure.  He responds to this with a normal blood pressure.  He is not a candidate to wait for p.o. hydration given that he is hypotensive.    Laboratories show a persistent renal insufficiency.  No significant changes.  CT of the head shows no evidence of intracranial hemorrhage.  At this point will be put in the hospital for further workup.  The case was discussed with Dr. Josue.  He has admitted and seen the patient.      FINAL IMPRESSION  1. Altered mental status, unspecified altered mental status type    2. Malaise    3.  Chronic renal insufficiency       This dictation was created using voice recognition software.    Electronically signed by: Chet Price, 11/20/2018 1:43 PM

## 2018-11-20 NOTE — ED NOTES
"Chief Complaint   Patient presents with   • Weakness   • Tired     Pt was unable to complete his PT today    • ALOC     pt wife, \"he is out of it\" states he isn't acting normal. FSBS      Agree with triage RN. Chart up for ERP.   "

## 2018-11-20 NOTE — ED TRIAGE NOTES
"72 y/o male to triage by w/c with his wife   Chief Complaint   Patient presents with   • Weakness   • Tired     Pt was unable to complete his PT today    • ALOC     pt wife, \"he is out of it\" states he isn't acting normal. FSBS      Pt has stroke Dec 31, 2016 with left side weakness   "

## 2018-11-21 ENCOUNTER — APPOINTMENT (OUTPATIENT)
Dept: RADIOLOGY | Facility: MEDICAL CENTER | Age: 73
End: 2018-11-21
Attending: HOSPITALIST
Payer: MEDICARE

## 2018-11-21 ENCOUNTER — PATIENT OUTREACH (OUTPATIENT)
Dept: HEALTH INFORMATION MANAGEMENT | Facility: OTHER | Age: 73
End: 2018-11-21

## 2018-11-21 VITALS
SYSTOLIC BLOOD PRESSURE: 138 MMHG | DIASTOLIC BLOOD PRESSURE: 71 MMHG | OXYGEN SATURATION: 98 % | RESPIRATION RATE: 18 BRPM | BODY MASS INDEX: 27.56 KG/M2 | WEIGHT: 203.48 LBS | HEART RATE: 70 BPM | TEMPERATURE: 98 F | HEIGHT: 72 IN

## 2018-11-21 LAB
AMPHET UR QL SCN: NEGATIVE
ANION GAP SERPL CALC-SCNC: 8 MMOL/L (ref 0–11.9)
APPEARANCE UR: ABNORMAL
BACTERIA #/AREA URNS HPF: ABNORMAL /HPF
BARBITURATES UR QL SCN: NEGATIVE
BENZODIAZ UR QL SCN: NEGATIVE
BILIRUB UR QL STRIP.AUTO: NEGATIVE
BUN SERPL-MCNC: 42 MG/DL (ref 8–22)
BZE UR QL SCN: NEGATIVE
CALCIUM SERPL-MCNC: 8.6 MG/DL (ref 8.5–10.5)
CANNABINOIDS UR QL SCN: NEGATIVE
CHLORIDE SERPL-SCNC: 107 MMOL/L (ref 96–112)
CHOLEST SERPL-MCNC: 121 MG/DL (ref 100–199)
CO2 SERPL-SCNC: 23 MMOL/L (ref 20–33)
COLOR UR: YELLOW
CREAT SERPL-MCNC: 2.04 MG/DL (ref 0.5–1.4)
EPI CELLS #/AREA URNS HPF: NEGATIVE /HPF
EST. AVERAGE GLUCOSE BLD GHB EST-MCNC: 154 MG/DL
GLUCOSE BLD-MCNC: 110 MG/DL (ref 65–99)
GLUCOSE BLD-MCNC: 199 MG/DL (ref 65–99)
GLUCOSE BLD-MCNC: 260 MG/DL (ref 65–99)
GLUCOSE SERPL-MCNC: 142 MG/DL (ref 65–99)
GLUCOSE UR STRIP.AUTO-MCNC: NEGATIVE MG/DL
HBA1C MFR BLD: 7 % (ref 0–5.6)
HDLC SERPL-MCNC: 25 MG/DL
HYALINE CASTS #/AREA URNS LPF: ABNORMAL /LPF
KETONES UR STRIP.AUTO-MCNC: NEGATIVE MG/DL
LDLC SERPL CALC-MCNC: 66 MG/DL
LEUKOCYTE ESTERASE UR QL STRIP.AUTO: ABNORMAL
LV EJECT FRACT  99904: 60
LV EJECT FRACT MOD 2C 99903: 69.77
LV EJECT FRACT MOD 4C 99902: 71.86
LV EJECT FRACT MOD BP 99901: 71.44
METHADONE UR QL SCN: NEGATIVE
MICRO URNS: ABNORMAL
NITRITE UR QL STRIP.AUTO: NEGATIVE
OPIATES UR QL SCN: NEGATIVE
OXYCODONE UR QL SCN: NEGATIVE
PCP UR QL SCN: NEGATIVE
PH UR STRIP.AUTO: 6.5 [PH]
POTASSIUM SERPL-SCNC: 3.8 MMOL/L (ref 3.6–5.5)
PROPOXYPH UR QL SCN: NEGATIVE
PROT UR QL STRIP: NEGATIVE MG/DL
RBC # URNS HPF: ABNORMAL /HPF
RBC UR QL AUTO: ABNORMAL
SODIUM SERPL-SCNC: 138 MMOL/L (ref 135–145)
SP GR UR STRIP.AUTO: 1.02
TRIGL SERPL-MCNC: 150 MG/DL (ref 0–149)
UROBILINOGEN UR STRIP.AUTO-MCNC: 0.2 MG/DL
WBC #/AREA URNS HPF: ABNORMAL /HPF

## 2018-11-21 PROCEDURE — G8978 MOBILITY CURRENT STATUS: HCPCS | Mod: CK

## 2018-11-21 PROCEDURE — 700102 HCHG RX REV CODE 250 W/ 637 OVERRIDE(OP): Performed by: HOSPITALIST

## 2018-11-21 PROCEDURE — 93880 EXTRACRANIAL BILAT STUDY: CPT

## 2018-11-21 PROCEDURE — 97162 PT EVAL MOD COMPLEX 30 MIN: CPT | Mod: KX

## 2018-11-21 PROCEDURE — 97165 OT EVAL LOW COMPLEX 30 MIN: CPT

## 2018-11-21 PROCEDURE — G8987 SELF CARE CURRENT STATUS: HCPCS | Mod: CJ

## 2018-11-21 PROCEDURE — 96372 THER/PROPH/DIAG INJ SC/IM: CPT

## 2018-11-21 PROCEDURE — G8989 SELF CARE D/C STATUS: HCPCS | Mod: CJ

## 2018-11-21 PROCEDURE — 70551 MRI BRAIN STEM W/O DYE: CPT

## 2018-11-21 PROCEDURE — G0378 HOSPITAL OBSERVATION PER HR: HCPCS

## 2018-11-21 PROCEDURE — 99217 PR OBSERVATION CARE DISCHARGE: CPT | Performed by: HOSPITALIST

## 2018-11-21 PROCEDURE — 36415 COLL VENOUS BLD VENIPUNCTURE: CPT

## 2018-11-21 PROCEDURE — 80061 LIPID PANEL: CPT

## 2018-11-21 PROCEDURE — A9270 NON-COVERED ITEM OR SERVICE: HCPCS | Performed by: HOSPITALIST

## 2018-11-21 PROCEDURE — G8988 SELF CARE GOAL STATUS: HCPCS | Mod: CJ

## 2018-11-21 PROCEDURE — 82962 GLUCOSE BLOOD TEST: CPT

## 2018-11-21 PROCEDURE — G8979 MOBILITY GOAL STATUS: HCPCS | Mod: CI

## 2018-11-21 PROCEDURE — 80048 BASIC METABOLIC PNL TOTAL CA: CPT

## 2018-11-21 RX ADMIN — CLOPIDOGREL 75 MG: 75 TABLET, FILM COATED ORAL at 06:28

## 2018-11-21 RX ADMIN — SPIRONOLACTONE 50 MG: 50 TABLET ORAL at 06:27

## 2018-11-21 RX ADMIN — INSULIN LISPRO 2 UNITS: 100 INJECTION, SOLUTION INTRAVENOUS; SUBCUTANEOUS at 00:04

## 2018-11-21 RX ADMIN — ASPIRIN 81 MG: 81 TABLET, DELAYED RELEASE ORAL at 06:27

## 2018-11-21 RX ADMIN — INSULIN LISPRO 5 UNITS: 100 INJECTION, SOLUTION INTRAVENOUS; SUBCUTANEOUS at 13:31

## 2018-11-21 RX ADMIN — ACETAMINOPHEN 650 MG: 325 TABLET, FILM COATED ORAL at 00:11

## 2018-11-21 RX ADMIN — FENOFIBRATE 134 MG: 134 CAPSULE ORAL at 06:29

## 2018-11-21 RX ADMIN — LOSARTAN POTASSIUM 50 MG: 50 TABLET ORAL at 06:27

## 2018-11-21 RX ADMIN — FLUOXETINE HYDROCHLORIDE 40 MG: 20 CAPSULE ORAL at 06:27

## 2018-11-21 RX ADMIN — INSULIN GLARGINE 16 UNITS: 100 INJECTION, SOLUTION SUBCUTANEOUS at 01:04

## 2018-11-21 RX ADMIN — ACETAMINOPHEN 650 MG: 325 TABLET, FILM COATED ORAL at 10:00

## 2018-11-21 RX ADMIN — INSULIN LISPRO 6 UNITS: 100 INJECTION, SOLUTION INTRAVENOUS; SUBCUTANEOUS at 13:33

## 2018-11-21 ASSESSMENT — GAIT ASSESSMENTS
ASSISTIVE DEVICE: QUAD CANE
DISTANCE (FEET): 10
GAIT LEVEL OF ASSIST: MODERATE ASSIST
DEVIATION: DECREASED BASE OF SUPPORT;BRADYKINETIC;DECREASED HEEL STRIKE;DECREASED TOE OFF;OTHER (COMMENT)

## 2018-11-21 ASSESSMENT — COGNITIVE AND FUNCTIONAL STATUS - GENERAL
HELP NEEDED FOR BATHING: A LOT
TURNING FROM BACK TO SIDE WHILE IN FLAT BAD: A LITTLE
SUGGESTED CMS G CODE MODIFIER MOBILITY: CL
MOBILITY SCORE: 11
CLIMB 3 TO 5 STEPS WITH RAILING: TOTAL
MOVING TO AND FROM BED TO CHAIR: A LOT
MOVING FROM LYING ON BACK TO SITTING ON SIDE OF FLAT BED: UNABLE
DRESSING REGULAR LOWER BODY CLOTHING: A LOT
DRESSING REGULAR UPPER BODY CLOTHING: A LITTLE
TOILETING: A LOT
SUGGESTED CMS G CODE MODIFIER DAILY ACTIVITY: CK
DAILY ACTIVITIY SCORE: 17
STANDING UP FROM CHAIR USING ARMS: A LOT
WALKING IN HOSPITAL ROOM: A LOT

## 2018-11-21 ASSESSMENT — PAIN SCALES - GENERAL
PAINLEVEL_OUTOF10: 0
PAINLEVEL_OUTOF10: 0

## 2018-11-21 ASSESSMENT — PATIENT HEALTH QUESTIONNAIRE - PHQ9
2. FEELING DOWN, DEPRESSED, IRRITABLE, OR HOPELESS: NOT AT ALL
1. LITTLE INTEREST OR PLEASURE IN DOING THINGS: NOT AT ALL
SUM OF ALL RESPONSES TO PHQ9 QUESTIONS 1 AND 2: 0

## 2018-11-21 ASSESSMENT — ACTIVITIES OF DAILY LIVING (ADL): TOILETING: REQUIRES ASSIST

## 2018-11-21 NOTE — DISCHARGE SUMMARY
"Discharge Summary    CHIEF COMPLAINT ON ADMISSION  Chief Complaint   Patient presents with   • Weakness   • Tired     Pt was unable to complete his PT today    • ALOC     pt wife, \"he is out of it\" states he isn't acting normal. FSBS        Reason for Admission  WEAKNESS     Admission Date  11/20/2018    CODE STATUS  Full Code    HPI & HOSPITAL COURSE  This is a 73 y.o. male here with altered mental status.  The patient altered mental status was only there for about 2 hours.  He was actually doing his physical therapy when he came on.  He did not have dysphagia or paralysis of any of the extremities just generalized weakness.  The wife brought him into the hospital for evaluation.  The patient's condition quickly improved and went back to his baseline.  He did have a complete workup for TIA.  MRI of the brain shows cerebral atrophy with old infarcts and encephalomalacia but no acute infarct mass or hemorrhage.  Patient's left ventricular ejection fraction is 60%, normal valves, mild pericardial effusion without hemodynamic compromise.  Patient carotids were also evaluated which shows less than 50% stenosis bilaterally overall patient's condition has stabilized he has a negative workup keeping discharged home with outpatient follow-ups and monitoring.       Therefore, he is discharged in good and stable condition to home with close outpatient follow-up.    The patient recovered much more quickly than anticipated on admission.    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 *      FOLLOW UP  Future Appointments  Date Time Provider Department Center   11/28/2018 11:45 AM Laly Jarvis M.D. [facility]   11/28/2018 4:20 PM ADRIANA [facility] SKimberly [facility]   1/23/2019 3:20 PM ADRIANA ArmstrongR [facility]   2/19/2019 11:20 AM AD[facility] [place]   4/16/2019 11:20 AM Osvaldo Tucker M.D. [facility] None     No follow-up provider specified.    MEDICATIONS ON DISCHARGE     Medication List      CONTINUE taking these medications      Instructions   acetaminophen 500 MG Tabs  Commonly known as:  TYLENOL   Take 1,000 mg by mouth every 6 hours as needed for Mild Pain.  Dose:  1000 mg     allopurinol 300 MG Tabs  Commonly known as:  ZYLOPRIM   Take 150 mg by mouth every day.  Dose:  150 mg     ascorbic acid 500 MG tablet  Commonly known as:  VITAMIN C   Take 500 mg by mouth every day.  Dose:  500 mg     aspirin 81 MG tablet   Take 81 mg by mouth every day.  Dose:  81 mg     atorvastatin 40 MG Tabs  Commonly known as:  LIPITOR   Take 1 Tab by mouth every day.  Dose:  40 mg     CENTRUM SILVER PO   Take 1 Tab by mouth every day.  Dose:  1 Tab     clopidogrel 75 MG Tabs  Commonly known as:  PLAVIX   Take 1 Tab by mouth every day.  Dose:  75 mg     Dulaglutide 1.5 MG/0.5ML Sopn  Commonly known as:  TRULICITY   Inject 1.5 mg as instructed every 7 days.  Dose:  1.5 mg     fenofibrate 145 MG Tabs  Commonly known as:  TRICOR   Take 1 Tab by mouth every day.  Dose:  145 mg     fluoxetine 40 MG capsule  Commonly known as:  PROZAC   TAKE 1 CAPSULE BY MOUTH EVERY DAY     HUMALOG KWIKPEN 100 UNIT/ML Sopn  injection  Generic drug:  insulin lispro (Human)   Inject 5-14 Units as instructed 3 times a day before meals.  Dose:  5-14 Units     losartan 50 MG Tabs  Commonly known as:  COZAAR   Doctor's comments:  **Patient requests 90 days supply**  TAKE 1 TABLET BY MOUTH EVERY DAY     magnesium oxide 400 MG Tabs  Commonly known as:  MAG-OX   Take 1 Tab by mouth 3 times a day.  Dose:  400 mg     metoprolol 200 MG XL tablet  Commonly known as:  TOPROL-XL   Take 1 Tab by mouth every evening.  Dose:  200 mg     spironolactone 50 MG Tabs  Commonly known as:  ALDACTONE   Take 1 Tab by mouth every day.  Dose:  50 mg     tamsulosin 0.4 MG capsule  Commonly known as:  FLOMAX   Take 0.4 mg by mouth every evening.  Dose:  0.4 mg     TOUJEO MAX SOLOSTAR 300 UNIT/ML Sopn  Generic drug:  Insulin Glargine   Inject 16 Units as instructed every bedtime.  Dose:  16 Units     Vitamin D 2000 units Caps   Take 1 Cap by mouth 2 Times a Day.  Dose:  1 Cap            Allergies  Allergies   Allergen Reactions   • Diphenhydramine Hcl Anxiety     Pt is able to tolerate  Mg benadryl with less anxiety   • Lorazepam Unspecified     Disorientation   • Ciprofloxacin      Rash,stomach ache       DIET  Orders Placed This Encounter   Procedures   • Diet Order Regular     Standing Status:   Standing     Number of Occurrences:   1     Order Specific Question:   Diet:     Answer:   Regular [1]       ACTIVITY  As tolerated.  Weight bearing as tolerated    CONSULTATIONS  None    PROCEDURES  None    LABORATORY  Lab Results   Component Value Date    SODIUM 138 11/21/2018    POTASSIUM 3.8 11/21/2018    CHLORIDE 107 11/21/2018    CO2 23 11/21/2018    GLUCOSE 142 (H) 11/21/2018    BUN 42 (H) 11/21/2018    CREATININE 2.04 (H) 11/21/2018    CREATININE 1.4 05/28/2008        Lab Results   Component Value Date    WBC 10.0 11/20/2018    HEMOGLOBIN 14.5 11/20/2018    HEMATOCRIT 42.5 11/20/2018    PLATELETCT 314 11/20/2018        Total time of the discharge process exceeds 35  minutes.

## 2018-11-21 NOTE — ASSESSMENT & PLAN NOTE
Patient was a TIA at this point will be admitted to the clinical decision unit.  Patient apparently this morning was doing some outpatient physical therapy when he was slow to respond.  His speech was slow and confused.  His overall behavior was slow and very lethargic.  Patient at this point will be undergoing an MRI especially with history of a stroke.  Also an evaluation with a carotid ultrasound and echocardiogram.  Continue at this point with aspirin and statin.

## 2018-11-21 NOTE — DISCHARGE PLANNING
Care Transition Team Assessment    Spoke with patient and spouse @ bedside with verbal consent. Spouse will be ride home. Anticipate no needs @ present time.    Information Source  Orientation : Oriented x 4  Information Given By: Patient    Readmission Evaluation  Is this a readmission?: No    Interdisciplinary Discharge Planning  Does Admitting Nurse Feel This Could be a Complex Discharge?: No  Primary Care Physician: Mtichell Pantoja  Lives with - Patient's Self Care Capacity: Spouse  Patient or legal guardian wants to designate a caregiver (see row info): No  Support Systems: Family Member(s), Spouse / Significant Other  Housing / Facility: [address]  Do You Take your Prescribed Medications Regularly: Yes  Able to Return to Previous ADL's: Yes  Mobility Issues: Yes  Prior Services: None  Patient Expects to be Discharged to:: Home  Assistance Needed: Yes  Durable Medical Equipment: Walker, Other - Specify (W/C)    Discharge Preparedness  What are your discharge supports?: Spouse  Prior Functional Level: Uses Cane, Uses Walker, Uses Wheelchair    Functional Assesment  Prior Functional Level: Uses Cane, Uses Walker, Uses Wheelchair         Anticipated Discharge Information  Anticipated discharge disposition: Home  Discharge Address: [address] [place] [address]  Discharge Contact Phone Number: [phone]

## 2018-11-21 NOTE — PROGRESS NOTES
IV removed. Discharge instructions provided and pt verbalizes understanding. Pt states that all question have been answered. Copy of discharge provided to pt and signed. Pt states that all personal items are in possess. Pt escorted off unit by spouse without incident via wheelchair.

## 2018-11-21 NOTE — DISCHARGE PLANNING
PMR order received from Dr. Saleh.  MCR/ANT is shown for his medical provider.  Presented with stroke-like sx.  W/U & TX are pending.  This referral will not be forwarded to Physiatry @ this time.  TCC remains monitoring.  I do appreciate the referral.

## 2018-11-21 NOTE — ASSESSMENT & PLAN NOTE
-accus with sliding scale coverage  -diabetic diet  -diabetic education  -follow glycohemoglobin levels long term, most recent hemoglobin A1c is 5.9  -continue with oral antihyperglycemics  -monitor for hypoglycemic episodes and adjust control if he should get low

## 2018-11-21 NOTE — THERAPY
"Physical Therapy Evaluation completed.   Bed Mobility:  Supine to Sit: Contact Guard Assist  Transfers: Sit to Stand: Moderate Assist  Gait: Level Of Assist: Moderate Assist with Quad Cane       Plan of Care: Will benefit from Physical Therapy 3 times per week and Plan to complete next treatment by Friday 11/23  Discharge Recommendations: Equipment: No Equipment Needed. Post-acute therapy Discharge to home with outpatient or home health for additional skilled therapy services.    Pt is a 73 year old male admitted to the hospital for hospital for weakness, fatigue, mild aphasia and difficulty ambulating. Pt with history of prior CVA with L sided weakness. Pt lives with wife who is able to assist with care 24/7. Pt typically utilizes platform walker for ambulation but had been progressing to use of quad cane with outpatient therapy. Pt lives in single level home and has appropriate equipment at home including shower chair, WC, platform walker. AT time of initial evaluation, pt required CGA for bed mobility, moderate assist for sit to stand and moderate assist to ambulate short distances. Pt ambulated short distance with quad cane in room with moderate assist. Pt with heavy L lateral lean, decreased and inconsistent step and stride length. Pt does utilize AFO on L due to foot drop. Pt and wife report pt is currently not at prior level of function as pt was independent with bed mobility, transfers and ambulation with platform walker. Pt would benefit from skilled PT intervention while in the acute care setting to address the listed deficits and improve mobility prior to DC home. Pt will require post acute therapy upon DC,    See \"Rehab Therapy-Acute\" Patient Summary Report for complete documentation.     "

## 2018-11-21 NOTE — WOUND TEAM
Wound consult placed for multiple wounds on posterior right lower leg/ankle and left ankle/leg.  Wife at bedside who is knowledgeable with wounds and applies betadine and bandaids per outpatient wound clinic recommendations.  He follows up with them every two weeks.  Bandaids intact and no other care is required at this time.  Discussed with staff RN.

## 2018-11-21 NOTE — H&P
Hospital Medicine History & Physical Note    Date of Service  11/20/2018    Primary Care Physician  Mitchell Pantoja M.D.    Consultants  None    Code Status  Full code    Chief Complaint  Generalized weakness    History of Presenting Illness  73 y.o. male who presented 11/20/2018 with history of stroke who presents today after this morning's generalized weakness and partial aphasia.  The patient at this point will be admitted to the clinical decision unit.  We will evaluate him for possible stroke with an MRI of the head carotid ultrasound and echocardiogram.  Patient will be placed on Lipitor and statin.  Patient is a retired  from [place] who at this point he has been getting some rehabilitation after he was evaluated for just being weak and tired.  The patient does have a history of diabetes as well as hypertension which will be optimized.  He also has a history of heart disease for which medication optimization will be undertaken.    Review of Systems  Review of Systems   Constitutional: Positive for malaise/fatigue. Negative for chills, diaphoresis and fever.   HENT: Negative.    Eyes: Negative.  Negative for double vision.   Respiratory: Negative.  Negative for cough, hemoptysis and wheezing.    Cardiovascular: Negative.  Negative for chest pain, palpitations and leg swelling.   Gastrointestinal: Negative.  Negative for abdominal pain, blood in stool, constipation, diarrhea, heartburn, nausea and vomiting.   Genitourinary: Negative.  Negative for frequency, hematuria and urgency.   Musculoskeletal: Negative.  Negative for joint pain.   Skin: Negative for itching and rash.        Lower extremity skin lesions secondary to bruising and bumping that is being actively treated as an outpatient by wound care.   Neurological: Negative.  Negative for dizziness, focal weakness, seizures, loss of consciousness and headaches.   Endo/Heme/Allergies: Negative.  Does not bruise/bleed easily.    Psychiatric/Behavioral: Negative.  Negative for suicidal ideas. The patient is not nervous/anxious.    All other systems reviewed and are negative.      Past Medical History   has a past medical history of Acute respiratory failure with hypoxia ([facility]ly [facility]) (5/20/2017); CAD (coronary artery disease); Cancer ([facility]); Cataract; Cerebrovascular accident (CVA) (Formerly [facility]) (12/30/2016); CKD (chronic kidney disease) stage 3, GFR 30-59 ml/min ([facility]); Controlled gout (2014); Coronary atherosclerosis of native coronary artery; Depression; Diabetes ([facility]); Enterococcal septicemia ([facility]) (8/12/2017); Hypertension; Hypokalemia (2012); Hypomagnesemia (08/12/2017); Lymphoma ([facility]) (2/19/2017); Mixed hyperlipidemia; Nephrolithiasis (2006); NSTEMI (non-ST elevated myocardial infarction) (Formerly [facility]) (07/18/2017); Pain; Polyneuropathy in diabetes(357.2) (9/11/2013); Septic shock ([facility]) (5/20/2017); Skin ulcer of calf ([facility]ly [facility]) (2015); Stroke ([facility]ly [facility]) (2016); Urinary bladder disorder; Urinary incontinence; and Wound of left leg (2012). He also has no past medical history of Suicide attempt ([facility]ly [facility]).    Surgical History   has a past surgical history that includes lithotripsy; angioplasty (1997); wound closure general (4/3/2012); tonsillectomy and adenoidectomy; cataract extraction with iol; solo by laparoscopy (1998); cystoscopy stent placement (Left, 2/12/2018); ureteroscopy (Left, 2/12/2018); lasertripsy (Left, 2/12/2018); cardiac cath (1997); and cardiac cath (2009).     Family History  family history includes Cancer in his mother; Depression in his brother and mother; Diabetes in his father; Heart Disease in his brother and father; Kidney stones in his brother; Psychiatry in his brother, mother, and other; Suicide Attempts in his other.     Social History   reports that he has never smoked. He has never used smokeless tobacco. He reports that he does not drink alcohol or use drugs.    Allergies  Allergies   Allergen Reactions   • Diphenhydramine Hcl Anxiety     Pt  is able to tolerate  Mg benadryl with less anxiety   • Lorazepam Unspecified     Disorientation   • Ciprofloxacin      Rash,stomach ache       Medications  Prior to Admission Medications   Prescriptions Last Dose Informant Patient Reported? Taking?   CENTRUM SILVER PO 11/20/2018 at 1000 Family Member Yes No   Sig: Take 1 Tab by mouth every day.   Cholecalciferol (VITAMIN D) 2000 units Cap 11/20/2018 at 1000 Family Member Yes No   Sig: Take 1 Cap by mouth 2 Times a Day.   Dulaglutide (TRULICITY) 1.5 MG/0.5ML Solution Pen-injector 11/13/2018 at AM Family Member No No   Sig: Inject 1.5 mg as instructed every 7 days.   Insulin Glargine (TOUJEO MAX SOLOSTAR) 300 UNIT/ML Solution Pen-injector 11/19/2018 at 2230 Family Member Yes Yes   Sig: Inject 16 Units as instructed every bedtime.   acetaminophen (TYLENOL) 500 MG Tab 11/20/2018 at 1130 Family Member Yes Yes   Sig: Take 1,000 mg by mouth every 6 hours as needed for Mild Pain.   allopurinol (ZYLOPRIM) 300 MG Tab 11/19/2018 at 2230 Family Member Yes Yes   Sig: Take 150 mg by mouth every day.   ascorbic acid (VITAMIN C) 500 MG tablet 11/20/2018 at 1000 Family Member Yes No   Sig: Take 500 mg by mouth every day.   aspirin 81 MG tablet 11/20/2018 at 1000 Family Member Yes No   Sig: Take 81 mg by mouth every day.   atorvastatin (LIPITOR) 40 MG Tab 11/19/2018 at 2230 Family Member No No   Sig: Take 1 Tab by mouth every day.   clopidogrel (PLAVIX) 75 MG Tab 11/19/2018 at 2330 Family Member No No   Sig: Take 1 Tab by mouth every day.   fenofibrate (TRICOR) 145 MG Tab 11/19/2018 at 2230 Family Member No No   Sig: Take 1 Tab by mouth every day.   fluoxetine (PROZAC) 40 MG capsule 11/20/2018 at 1000 Family Member No No   Sig: TAKE 1 CAPSULE BY MOUTH EVERY DAY   insulin lispro, Human, (HUMALOG KWIKPEN) 100 UNIT/ML Solution Pen-injector injection 11/20/2018 at 0930 Family Member Yes Yes   Sig: Inject 5-14 Units as instructed 3 times a day before meals.   losartan (COZAAR) 50 MG Tab  11/20/2018 at 1000 Family Member No No   Sig: TAKE 1 TABLET BY MOUTH EVERY DAY   magnesium oxide (MAG-OX) 400 MG Tab 11/20/2018 at 1000 Family Member No No   Sig: Take 1 Tab by mouth 3 times a day.   metoprolol (TOPROL-XL) 200 MG XL tablet 11/19/2018 at 2230 Family Member Yes No   Sig: Take 1 Tab by mouth every evening.   spironolactone (ALDACTONE) 50 MG Tab 11/20/2018 at 1000 Family Member No No   Sig: Take 1 Tab by mouth every day.   tamsulosin (FLOMAX) 0.4 MG capsule 11/19/2018 at 1730 Family Member Yes Yes   Sig: Take 0.4 mg by mouth every evening.      Facility-Administered Medications: None       Physical Exam  Temp:  [36.1 °C (97 °F)] 36.1 °C (97 °F)  Pulse:  [59-69] 65  Resp:  [16-23] 18  BP: ()/(54-82) 133/82    Physical Exam   Constitutional: He is oriented to person, place, and time. He appears well-developed and well-nourished.   HENT:   Head: Normocephalic and atraumatic.   Right Ear: External ear normal.   Left Ear: External ear normal.   Nose: Nose normal.   Mouth/Throat: Oropharynx is clear and moist.   Eyes: Pupils are equal, round, and reactive to light. Conjunctivae and EOM are normal.   Neck: Normal range of motion. Neck supple. No JVD present. No thyromegaly present.   Cardiovascular: Normal rate and regular rhythm.    No murmur heard.  Pulmonary/Chest: Effort normal and breath sounds normal. He has no wheezes. He has no rales. He exhibits no tenderness.   Abdominal: Soft. Bowel sounds are normal. He exhibits no distension and no mass. There is no tenderness. There is no rebound and no guarding.   Musculoskeletal: Normal range of motion. He exhibits no edema or tenderness.   Lymphadenopathy:     He has no cervical adenopathy.   Neurological: He is alert and oriented to person, place, and time. He has normal reflexes. No cranial nerve deficit.   Skin: Skin is warm and dry. Bruising noted. No rash noted. No erythema.   Psychiatric: He has a normal mood and affect. His behavior is normal.  Judgment normal.   Nursing note and vitals reviewed.      Laboratory:  Recent Labs      11/20/18   1255   WBC  10.0   RBC  4.71   HEMOGLOBIN  14.5   HEMATOCRIT  42.5   MCV  90.2   MCH  30.8   MCHC  34.1   RDW  47.5   PLATELETCT  314   MPV  9.9     Recent Labs      11/19/18   1109  11/20/18   1255   SODIUM  137  137   POTASSIUM  4.2  4.2   CHLORIDE  102  103   CO2  22  27   GLUCOSE  184*  161*   BUN  40*  41*   CREATININE  1.99*  2.19*   CALCIUM  9.8  9.6     Recent Labs      11/19/18   1109  11/20/18   1255   ALTSGPT   --   20   ASTSGOT   --   18   ALKPHOSPHAT   --   53   TBILIRUBIN   --   0.7   GLUCOSE  184*  161*     Recent Labs      11/20/18   1255   APTT  24.1*   INR  1.08             Recent Labs      11/20/18   1255  11/20/18   1630   TROPONINI  0.02  0.01       Urinalysis:    No results found     Imaging:  CT-HEAD W/O   Final Result      1.  Cerebral atrophy.      2.  White matter lucencies most consistent with small vessel ischemic change versus demyelination or gliosis.      3.  Otherwise, Head CT without contrast with no acute findings. No evidence of acute cerebral  hemorrhage or mass lesion.      DX-CHEST-PORTABLE (1 VIEW)   Final Result      No acute cardiopulmonary process is seen.      Atherosclerotic plaque.         EC-ECHOCARDIOGRAM COMPLETE W/O CONT    (Results Pending)   US-CAROTID DOPPLER BILAT    (Results Pending)   MR-BRAIN-W/O    (Results Pending)         Assessment/Plan:  I anticipate this patient is appropriate for observation status at this time.    TIA (transient ischemic attack)   Assessment & Plan    Patient was a TIA at this point will be admitted to the clinical decision unit.  Patient apparently this morning was doing some outpatient physical therapy when he was slow to respond.  His speech was slow and confused.  His overall behavior was slow and very lethargic.  Patient at this point will be undergoing an MRI especially with history of a stroke.  Also an evaluation with a carotid  ultrasound and echocardiogram.  Continue at this point with aspirin and statin.     ([facility]) Hypertension associated with diabetes- (present on admission)   Assessment & Plan    Continue with blood pressure management optimization.     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     GERD with esophagitis- (present on admission)   Assessment & Plan    Continue at this point with acid binders.     Normocytic hypochromic anemia- (present on admission)   Assessment & Plan    Monitor hemoglobin hematocrit if drops below 7 or 21 transfuse.     H/O non-Hodgkin's lymphoma- (present on admission)   Assessment & Plan    Currently in remission     ([facility]) CKD (chronic kidney disease) stage 3, GFR 30-59 ml/min- (present on admission)   Assessment & Plan    Monitor renal functions avoid nephrotoxic medications         VTE prophylaxis: Heparin

## 2018-11-21 NOTE — PROGRESS NOTES
Seen pt, AOx 4. On cardiac monitor with SR, HR 70-80's. Neuro back to baseline. Fluids on NS at 83 ml/hr. Plan of care discussed includes Safety, labs, cardiac/neuro monitoring, MRI, ECHO, Car Dup, PT/OT and pt understands.

## 2018-11-21 NOTE — ED NOTES
Patient report given at bedside, patient to floor with belongings and family on telemonitoring by alycia.

## 2018-11-21 NOTE — THERAPY
"Occupational Therapy Evaluation completed.   Functional Status:  Mod a for sit>stand; max A for LB dressing; SBA for sit>supine and scooting while EOB  Plan of Care: Patient with no further skilled OT needs in the acute care setting at this time  Discharge Recommendations:  Equipment: No Equipment Needed. Post-acute therapy Discharge to home with outpatient or home health for additional skilled therapy services.    See \"Rehab Therapy-Acute\" Patient Summary Report for complete documentation.    OT eval completed on 72 YO M admitted with altered mentation. Hx of stroke with L-sided deficits. Pt reports LUE is at baseline. SO reports pt has been attending classes at the [facility] for strengthening. Pt primarily limited due to weakness with standing and functional mobility, will defer to PT. Pt and SO report no concerns regarding BADLs. SO reports pt requires assist with LB dressing/shower/toileting at baseline they pt has DME required at home. Pt does not require acute OT services however would benefit from HH/outpatient services upon d/c.   "

## 2018-11-21 NOTE — DISCHARGE INSTRUCTIONS
Discharge Instructions    Discharged to home by car with relative. Discharged via wheelchair, hospital escort: Yes.  Special equipment needed: Not Applicable    Be sure to schedule a follow-up appointment with your primary care doctor or any specialists as instructed.     Discharge Plan:   Diet Plan: Discussed  Activity Level: Discussed  Confirmed Follow up Appointment: Patient to Call and Schedule Appointment  Confirmed Symptoms Management: Discussed  Medication Reconciliation Updated: Yes  Pneumococcal Vaccine Administered/Refused: Not given - Patient refused pneumococcal vaccine  Influenza Vaccine Indication: Patient Refuses    I understand that a diet low in cholesterol, fat, and sodium is recommended for good health. Unless I have been given specific instructions below for another diet, I accept this instruction as my diet prescription.   Other diet: heart healthy    Special Instructions: None    · Is patient discharged on Warfarin / Coumadin?   No         Discharge patient Home   Diet regular with 9-13 servings of fruits and vegetables   Activities as tolerated   Follow ups with PCP in 7-10 days, call for appointment   Meds per med rec sheet   No smoking, no alcohol, no caffeine   Wear seat belt in motorized vehicle   Take medications as perscribed   Keep appointments   If symptoms worsen call PCP, 911 or urgent care.                Depression / Suicide Risk    As you are discharged from this [facility] facility, it is important to learn how to keep safe from harming yourself.    Recognize the warning signs:  · Abrupt changes in personality, positive or negative- including increase in energy   · Giving away possessions  · Change in eating patterns- significant weight changes-  positive or negative  · Change in sleeping patterns- unable to sleep or sleeping all the time   · Unwillingness or inability to communicate  · Depression  · Unusual sadness, discouragement and loneliness  · Talk of wanting to  die  · Neglect of personal appearance   · Rebelliousness- reckless behavior  · Withdrawal from people/activities they love  · Confusion- inability to concentrate     If you or a loved one observes any of these behaviors or has concerns about self-harm, here's what you can do:  · Talk about it- your feelings and reasons for harming yourself  · Remove any means that you might use to hurt yourself (examples: pills, rope, extension cords, firearm)  · Get professional help from the community (Mental Health, Substance Abuse, psychological counseling)  · Do not be alone:Call your Safe Contact- someone whom you trust who will be there for you.  · Call your local CRISIS HOTLINE 077-4970 or 976-178-3033  · Call your local Children's Mobile Crisis Response Team [place] (845) 122-6326 or www.[employer]  · Call the toll free National Suicide Prevention Hotlines   · National Suicide Prevention Lifeline 423-898-CXQV (7351)  · National Hope Line Network 800-SUICIDE (834-5749)

## 2018-11-28 ENCOUNTER — NON-PROVIDER VISIT (OUTPATIENT)
Dept: WOUND CARE | Facility: MEDICAL CENTER | Age: 73
End: 2018-11-28
Attending: INTERNAL MEDICINE
Payer: MEDICARE

## 2018-11-28 ENCOUNTER — OFFICE VISIT (OUTPATIENT)
Dept: NEPHROLOGY | Facility: MEDICAL CENTER | Age: 73
End: 2018-11-28
Payer: MEDICARE

## 2018-11-28 ENCOUNTER — OFFICE VISIT (OUTPATIENT)
Dept: MEDICAL GROUP | Facility: MEDICAL CENTER | Age: 73
End: 2018-11-28
Payer: MEDICARE

## 2018-11-28 VITALS
SYSTOLIC BLOOD PRESSURE: 104 MMHG | HEART RATE: 72 BPM | HEIGHT: 72 IN | BODY MASS INDEX: 28.31 KG/M2 | RESPIRATION RATE: 14 BRPM | TEMPERATURE: 97.3 F | WEIGHT: 209 LBS | OXYGEN SATURATION: 96 % | DIASTOLIC BLOOD PRESSURE: 64 MMHG

## 2018-11-28 DIAGNOSIS — E11.29 MICROALBUMINURIA DUE TO TYPE 2 DIABETES MELLITUS (HCC): ICD-10-CM

## 2018-11-28 DIAGNOSIS — Z23 NEED FOR INFLUENZA VACCINATION: ICD-10-CM

## 2018-11-28 DIAGNOSIS — R80.9 MICROALBUMINURIA DUE TO TYPE 2 DIABETES MELLITUS (HCC): ICD-10-CM

## 2018-11-28 DIAGNOSIS — Z99.3 WHEELCHAIR DEPENDENT: ICD-10-CM

## 2018-11-28 DIAGNOSIS — N18.30 CKD (CHRONIC KIDNEY DISEASE), STAGE III (HCC): ICD-10-CM

## 2018-11-28 DIAGNOSIS — N39.0 RECURRENT UTI: ICD-10-CM

## 2018-11-28 DIAGNOSIS — Z86.73 H/O TIA (TRANSIENT ISCHEMIC ATTACK) AND STROKE: ICD-10-CM

## 2018-11-28 DIAGNOSIS — D50.9 NORMOCYTIC HYPOCHROMIC ANEMIA: ICD-10-CM

## 2018-11-28 DIAGNOSIS — E55.9 VITAMIN D DEFICIENCY: ICD-10-CM

## 2018-11-28 DIAGNOSIS — I10 ESSENTIAL HYPERTENSION: ICD-10-CM

## 2018-11-28 DIAGNOSIS — Z86.73 HISTORY OF CEREBROVASCULAR ACCIDENT (CVA) IN ADULTHOOD: ICD-10-CM

## 2018-11-28 DIAGNOSIS — E11.59 HYPERTENSION ASSOCIATED WITH DIABETES (HCC): ICD-10-CM

## 2018-11-28 DIAGNOSIS — Z23 NEED FOR VACCINATION: ICD-10-CM

## 2018-11-28 DIAGNOSIS — I15.2 HYPERTENSION ASSOCIATED WITH DIABETES (HCC): ICD-10-CM

## 2018-11-28 PROCEDURE — 90662 IIV NO PRSV INCREASED AG IM: CPT | Performed by: FAMILY MEDICINE

## 2018-11-28 PROCEDURE — 99214 OFFICE O/P EST MOD 30 MIN: CPT | Performed by: INTERNAL MEDICINE

## 2018-11-28 PROCEDURE — G0009 ADMIN PNEUMOCOCCAL VACCINE: HCPCS | Performed by: FAMILY MEDICINE

## 2018-11-28 PROCEDURE — 90670 PCV13 VACCINE IM: CPT | Performed by: FAMILY MEDICINE

## 2018-11-28 PROCEDURE — G0008 ADMIN INFLUENZA VIRUS VAC: HCPCS | Performed by: FAMILY MEDICINE

## 2018-11-28 PROCEDURE — 97602 WOUND(S) CARE NON-SELECTIVE: CPT

## 2018-11-28 PROCEDURE — 99214 OFFICE O/P EST MOD 30 MIN: CPT | Mod: 25 | Performed by: FAMILY MEDICINE

## 2018-11-28 RX ORDER — LOSARTAN POTASSIUM 25 MG/1
25 TABLET ORAL
Qty: 90 TAB | Refills: 0 | Status: SHIPPED | OUTPATIENT
Start: 2018-11-28 | End: 2019-01-28 | Stop reason: SDUPTHER

## 2018-11-28 RX ORDER — LOSARTAN POTASSIUM 25 MG/1
50 TABLET ORAL
Qty: 90 TAB | Refills: 0 | Status: SHIPPED | OUTPATIENT
Start: 2018-11-28 | End: 2018-11-28 | Stop reason: SDUPTHER

## 2018-11-28 ASSESSMENT — ENCOUNTER SYMPTOMS
VOMITING: 0
WHEEZING: 0
COUGH: 0
WEIGHT LOSS: 0
DIARRHEA: 0
NAUSEA: 0
SHORTNESS OF BREATH: 0
ORTHOPNEA: 0
CHILLS: 0
PALPITATIONS: 0
MYALGIAS: 0
EYES NEGATIVE: 1
BACK PAIN: 0
HEARTBURN: 0
NECK PAIN: 0
HEMOPTYSIS: 0
FLANK PAIN: 0
ABDOMINAL PAIN: 0
FEVER: 0

## 2018-11-28 ASSESSMENT — PATIENT HEALTH QUESTIONNAIRE - PHQ9
9. THOUGHTS THAT YOU WOULD BE BETTER OFF DEAD, OR OF HURTING YOURSELF: NOT AT ALL
SUM OF ALL RESPONSES TO PHQ9 QUESTIONS 1 AND 2: 5
1. LITTLE INTEREST OR PLEASURE IN DOING THINGS: NEARLY EVERY DAY
SUM OF ALL RESPONSES TO PHQ QUESTIONS 1-9: 8
3. TROUBLE FALLING OR STAYING ASLEEP OR SLEEPING TOO MUCH: NOT AT ALL
8. MOVING OR SPEAKING SO SLOWLY THAT OTHER PEOPLE COULD HAVE NOTICED. OR THE OPPOSITE, BEING SO FIGETY OR RESTLESS THAT YOU HAVE BEEN MOVING AROUND A LOT MORE THAN USUAL: NOT AT ALL
2. FEELING DOWN, DEPRESSED, IRRITABLE, OR HOPELESS: MORE THAN HALF THE DAYS
6. FEELING BAD ABOUT YOURSELF - OR THAT YOU ARE A FAILURE OR HAVE LET YOURSELF OR YOUR FAMILY DOWN: SEVERAL DAYS
5. POOR APPETITE OR OVEREATING: NOT AT ALL
7. TROUBLE CONCENTRATING ON THINGS, SUCH AS READING THE NEWSPAPER OR WATCHING TELEVISION: NOT AT ALL
4. FEELING TIRED OR HAVING LITTLE ENERGY: MORE THAN HALF THE DAYS

## 2018-11-28 NOTE — PATIENT INSTRUCTIONS
-Should you experience any significant changes in your wound(s), such as infection (redness, swelling, localized heat, increased pain, fever > 101 F, chills) or have any questions regarding your home care instructions, please contact the wound center at [phone]. If after hours, contact your primary care physician or go to the hospital emergency room.

## 2018-11-28 NOTE — PROGRESS NOTES
Subjective:      Av Bob is a 73 y.o. male who presents with Follow-Up            Chronic Kidney Disease   Pertinent negatives include no abdominal pain, chest pain, chills, coughing, fever, myalgias, nausea, neck pain or vomiting.     Av is coming today for f/u of CKD III, microalbuminuria  Has long term hx/of Nephrolithiasis -f/u with Urologist -  Baseline creatinine level at 1.9's  -now at 2.0  Diagnosed with left renal obstructing kidney stone -treated with lithotripsy  (+) for recurrent UTI - last time treated in Sept 2018  C/o fatigue, low energy level -hospitalized 1 week ago with TIA  No difficulties to urinate.  No dysuria  No flank pain  HTN: BP well controlled    Review of Systems   Constitutional: Positive for malaise/fatigue. Negative for chills, fever and weight loss.   HENT: Negative.    Eyes: Negative.    Respiratory: Negative for cough, hemoptysis, shortness of breath and wheezing.    Cardiovascular: Negative for chest pain, palpitations, orthopnea and leg swelling.   Gastrointestinal: Negative for abdominal pain, diarrhea, heartburn, nausea and vomiting.   Genitourinary: Negative for dysuria, flank pain, frequency, hematuria and urgency.   Musculoskeletal: Negative for back pain, myalgias and neck pain.   Skin: Negative.    Neurological:        Hx/of CVA, on wheel chair   All other systems reviewed and are negative.         Objective:     /64 (BP Location: Right arm, Patient Position: Sitting, BP Cuff Size: Adult)   Pulse 72   Temp 36.3 °C (97.3 °F) (Temporal)   Resp 14   Ht 1.829 m (6')   Wt 94.8 kg (209 lb)   SpO2 96%   BMI 28.35 kg/m²      Physical Exam   Constitutional: He is oriented to person, place, and time. He appears well-developed and well-nourished. No distress.   HENT:   Head: Normocephalic and atraumatic.   Nose: Nose normal.   Mouth/Throat: Oropharynx is clear and moist.   Eyes: Conjunctivae are normal. Pupils are equal, round, and reactive to  light.   Neck: Normal range of motion. Neck supple.   Cardiovascular: Normal rate and regular rhythm.  Exam reveals no gallop and no friction rub.    Pulmonary/Chest: Effort normal and breath sounds normal. No respiratory distress. He has no wheezes. He has no rales.   Abdominal: Soft. Bowel sounds are normal. He exhibits no distension. There is no tenderness.   Musculoskeletal: He exhibits no edema.   Neurological: He is alert and oriented to person, place, and time. Left sided weakness  Nursing note and vitals reviewed.            Laboratory results reviewed:  Lab Results   Component Value Date/Time    CREATININE 2.04 (H) 11/21/2018 03:21 AM    CREATININE 1.4 05/28/2008 05:42 PM    POTASSIUM 3.8 11/21/2018 03:21 AM       Assessment/Plan:     1. CKD (chronic kidney disease), stage III      Creat slightly worse from baseline  baseline -to monitor closely      2. Essential hypertension, benign      Low BP -to reduce Losartan dose    3. Microalbuminuria due to type 2 diabetes mellitus (CMS-HCC)      Microalb/creat ratio at 0.14 -on ARB -to monitor      4. Vitamin D deficiency      Vit D and PTH WNL -continue supplementation      5. Nephrolithiasis      s/p lithotripsy    6.UTI - recurrent -completed 3 weeks course of abx     F/u with ID       Recs;  Reduce losartan to 25 mg po QD             To drink plenty of fluids              Avoid NSAID's              Monitor BP              F/u in 2 months with BMP, vit D, urine microalb/creat ratio                                                                      Physical Exam

## 2018-12-02 VITALS
DIASTOLIC BLOOD PRESSURE: 72 MMHG | SYSTOLIC BLOOD PRESSURE: 110 MMHG | OXYGEN SATURATION: 97 % | BODY MASS INDEX: 28.34 KG/M2 | HEART RATE: 70 BPM | HEIGHT: 72 IN | TEMPERATURE: 97.2 F

## 2018-12-02 PROBLEM — Z86.73 H/O TIA (TRANSIENT ISCHEMIC ATTACK) AND STROKE: Status: RESOLVED | Noted: 2018-11-20 | Resolved: 2018-12-02

## 2018-12-03 NOTE — ASSESSMENT & PLAN NOTE
Chronic, stable, well-controlled, taking medication as directed.  However, patient does report having had hypertensive readings.  Therefore, we discussed how to manage blood pressure medications.  Patient verbalized understanding.    ROS is NEGATIVE for dizziness, generalized weakness/fatigue, cold sweats,  vision/hearing changes, jaw pain/paresthesias, BUE pain/paresthesias/numbness/weakness, chest pain/pressure, palpitations, dyspnea, nausea, RUQ abdominal pain, oliguria/anuria, BLE edema.

## 2018-12-03 NOTE — ASSESSMENT & PLAN NOTE
Chronic, uncontrolled, however continues to improve over time.  Patient is to continue with physical therapy as directed.

## 2018-12-03 NOTE — ASSESSMENT & PLAN NOTE
Chronic, uncontrolled, however relatively stable with left-sided hemiparesis for which she continues to work with physical therapy.

## 2018-12-03 NOTE — PROGRESS NOTES
Subjective:   Chief Complaint/History of Present Illness:  Av Bob is a 73 y.o. male established patient who presents today to discuss medical problems as listed below    Diagnoses of H/O TIA (transient ischemic attack) and stroke, (HCC) Hypertension associated with diabetes, H/O Cerebrovascular accident (CVA) in adulthood, Wheelchair dependent, Need for vaccination, and Need for influenza vaccination were pertinent to this visit.    (HCC) Hypertension associated with diabetes  Chronic, stable, well-controlled, taking medication as directed.  However, patient does report having had hypertensive readings.  Therefore, we discussed how to manage blood pressure medications.  Patient verbalized understanding.    ROS is NEGATIVE for dizziness, generalized weakness/fatigue, cold sweats,  vision/hearing changes, jaw pain/paresthesias, BUE pain/paresthesias/numbness/weakness, chest pain/pressure, palpitations, dyspnea, nausea, RUQ abdominal pain, oliguria/anuria, BLE edema.      H/O TIA (transient ischemic attack) and stroke  New problem for my medical evaluation, uncontrolled, patient recently admitted for observation due to what appeared to be transient ischemic attack.  While this may have been due to a transient rise in blood pressure, his blood pressure appears to be chronic, stable, well-controlled at present time.    I have reviewed the images of the in detail, of the CT scan as well as MRI with patient, and have demonstrated to patient that she neither had acute infarcts, mass-effect, or ischemic events.      While patient does still have sequelae from his previous CVA, he no longer has any of the symptoms that he presented with this time around, as well documented in the notes from recent hospitalization.    Patient I discussed underlying etiology, that he may have had hypotensive episode.  To that end, we have discussed how he can manage his blood pressure medications.  Patient verbalized  understanding.    ROS is NEGATIVE for confusion, altered mentation, word finding difficulty, memory loss, diplopia, visual scotomas, facial droop, dysarthria, dysphagia, hemiplegia, gait instability, new/abnormal paresthesias/numbness.     H/O Cerebrovascular accident (CVA) in adulthood  Chronic, uncontrolled, however relatively stable with left-sided hemiparesis for which she continues to work with physical therapy.    Wheelchair dependent  Chronic, uncontrolled, however continues to improve over time.  Patient is to continue with physical therapy as directed.      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, without long-term current use of insulin 12/30/2016     Priority: Medium   • H/O Cerebrovascular accident (CVA) in adulthood 12/30/2016     Priority: Medium   • Coronary artery disease involving native coronary artery of native heart without angina pectoris 12/30/2016     Priority: Medium   • (HCC) Hyperlipidemia associated with type 2 diabetes mellitus 12/13/2011     Priority: Medium   • GERD with esophagitis 10/19/2018     Priority: Low   • Recurrent UTI 04/26/2018     Priority: Low   • (HCC) Left hemiparesis 12/27/2017     Priority: Low   • (HCC) Diabetic nephropathy associated with type 2 diabetes mellitus 11/30/2017     Priority: Low   • Psychophysiological insomnia 11/21/2017     Priority: Low   • (HCC) Moderate episode of recurrent major depressive disorder 11/07/2017     Priority: Low   • Wheelchair dependent 11/07/2017     Priority: Low   • Normocytic hypochromic anemia 05/20/2017     Priority: Low   • H/O non-Hodgkin's lymphoma 05/08/2017     Priority: Low   • (HCC) CKD (chronic kidney disease) stage 3, GFR 30-59  ml/min 08/25/2016     Priority: Low   • Idiopathic chronic gout of multiple sites without tophus 01/28/2014     Priority: Low       Additional History:   Allergies:    Diphenhydramine hcl; Lorazepam; and Ciprofloxacin     Current Medications:     Current Outpatient Prescriptions   Medication Sig Dispense Refill   • losartan (COZAAR) 25 MG Tab Take 1 Tab by mouth every day. 90 Tab 0   • fenofibrate (TRICOR) 145 MG Tab Take 1 Tab by mouth every day. 90 Tab 3   • spironolactone (ALDACTONE) 50 MG Tab Take 1 Tab by mouth every day. 90 Tab 1   • metoprolol (TOPROL-XL) 200 MG XL tablet Take 1 Tab by mouth every evening. 90 Tab 3   • HUMALOG KWIKPEN 100 UNIT/ML Solution Pen-injector injection INJECT 35 UNITS SUBCUTANEOUSLY 3 TIMES DAILY BEFORE MEALS 96 mL 6   • allopurinol (ZYLOPRIM) 300 MG Tab Take 150 mg by mouth every day.     • Insulin Glargine (TOUJEO MAX SOLOSTAR) 300 UNIT/ML Solution Pen-injector Inject 16 Units as instructed every bedtime.     • tamsulosin (FLOMAX) 0.4 MG capsule Take 0.4 mg by mouth every evening.     • acetaminophen (TYLENOL) 500 MG Tab Take 1,000 mg by mouth every 6 hours as needed for Mild Pain.     • magnesium oxide (MAG-OX) 400 MG Tab Take 1 Tab by mouth 3 times a day. 270 Tab 0   • atorvastatin (LIPITOR) 40 MG Tab Take 1 Tab by mouth every day. 90 Tab 0   • clopidogrel (PLAVIX) 75 MG Tab Take 1 Tab by mouth every day. 90 Tab 2   • fluoxetine (PROZAC) 40 MG capsule TAKE 1 CAPSULE BY MOUTH EVERY DAY 90 Cap 3   • Dulaglutide (TRULICITY) 1.5 MG/0.5ML Solution Pen-injector Inject 1.5 mg as instructed every 7 days. 12 PEN 1   • ascorbic acid (VITAMIN C) 500 MG tablet Take 500 mg by mouth every day.     • Cholecalciferol (VITAMIN D) 2000 units Cap Take 1 Cap by mouth 2 Times a Day.     • aspirin 81 MG tablet Take 81 mg by mouth every day.     • CENTRUM SILVER PO Take 1 Tab by mouth every day.       No current facility-administered medications for this visit.         Social History:     Social  "History   Substance Use Topics   • Smoking status: Never Smoker   • Smokeless tobacco: Never Used   • Alcohol use No       ROS:     - NOTE: All other systems reviewed and are negative, except as in HPI.     Objective:   Physical Exam:    Vitals: Blood pressure 110/72, pulse 70, temperature 36.2 °C (97.2 °F), height 1.829 m (6' 0.01\"), SpO2 97 %.   BMI: Body mass index is 28.34 kg/m².   General/Constitutional: Vitals as above, Well nourished, well developed male in no acute distress   Head/Eyes: Head is grossly normal & atraumatic, bilateral conjunctivae clear and not injected, bilateral EOMI, bilateral PERRL   ENT: Bilateral external ears grossly normal in appearance, Hearing grossly intact, External nares normal in appearance and without discharge/bleeding   Respiratory: No respiratory distress, bilateral lungs are clear to ausculation in all lung fields (anterior/lateral/posterior), no wheezing/rhonchi/rales   Cardiovascular: Regular rate and rhythm without murmur/gallops/rubs, distal pulses are intact and equal bilaterally (radial, posterior tibial), no bilateral lower extremity edema   MSK: Patient with mild decrease of muscular tone and strength in left side, as well as left-sided hemiparesis, and wheelchair bound/dependent   Integumentary: No apparent rashes   Psych: Judgment grossly appropriate, no apparent depression/anxiety    Health Maintenance:     - Vaccinations as below    Imaging/Labs:     - Imaging results reviewed and interpreted as in HPI above    Assessment and Plan:   1. H/O TIA (transient ischemic attack) and stroke  New problem for medical evaluation, seems to be resolved.    2. (HCC) Hypertension associated with diabetes  Chronic, stable, well-controlled.  Continue taking medication as directed.  Patient given further instructions on how to decrease blood pressure over time, as well as how to evaluate for hypotensive symptoms.  Patient and wife, in room, verbalized understanding.   - losartan " (COZAAR) 25 MG Tab; Take 1 Tab by mouth every day.  Dispense: 90 Tab; Refill: 0    3. H/O Cerebrovascular accident (CVA) in adulthood  Chronic, stable, uncontrolled, improving.    4. Wheelchair dependent  Chronic, stable, uncontrolled, improving.    5. Need for vaccination   - Pneumococcal Conjugate Vaccine 13-Valent    6. Need for influenza vaccination   - INFLUENZA VACCINE, HIGH DOSE (65+ ONLY)        RTC: As scheduled previously.    PLEASE NOTE: This dictation was created using voice recognition software. I have made every reasonable attempt to correct obvious errors, but I expect that there are errors of grammar and possibly content that I did not discover before finalizing the note.

## 2018-12-03 NOTE — ASSESSMENT & PLAN NOTE
New problem for my medical evaluation, uncontrolled, patient recently admitted for observation due to what appeared to be transient ischemic attack.  While this may have been due to a transient rise in blood pressure, his blood pressure appears to be chronic, stable, well-controlled at present time.    I have reviewed the images of the in detail, of the CT scan as well as MRI with patient, and have demonstrated to patient that she neither had acute infarcts, mass-effect, or ischemic events.      While patient does still have sequelae from his previous CVA, he no longer has any of the symptoms that he presented with this time around, as well documented in the notes from recent hospitalization.    Patient I discussed underlying etiology, that he may have had hypotensive episode.  To that end, we have discussed how he can manage his blood pressure medications.  Patient verbalized understanding.    ROS is NEGATIVE for confusion, altered mentation, word finding difficulty, memory loss, diplopia, visual scotomas, facial droop, dysarthria, dysphagia, hemiplegia, gait instability, new/abnormal paresthesias/numbness.

## 2018-12-12 ENCOUNTER — NON-PROVIDER VISIT (OUTPATIENT)
Dept: WOUND CARE | Facility: MEDICAL CENTER | Age: 73
End: 2018-12-12
Attending: INTERNAL MEDICINE
Payer: MEDICARE

## 2018-12-12 PROCEDURE — 97602 WOUND(S) CARE NON-SELECTIVE: CPT

## 2018-12-12 NOTE — PROCEDURES
Non selective blunt debridement with NS and gauze to remove non viable tissue from all wound beds.

## 2018-12-12 NOTE — PATIENT INSTRUCTIONS
Should you experience any significant changes in your wound(s) such as infection (redness, swelling, localized heat, increased pain, fever >101 F, chills) or have any questions regarding your home care instructions, please contact the wound center [phone]. If after hours, contact your primary care physician or go the hospital emergency room.  Keep dressing clean and dry and cover while bathing. Only change dressing if over saturated, soiled or its falling off.

## 2018-12-27 ENCOUNTER — OFFICE VISIT (OUTPATIENT)
Dept: WOUND CARE | Facility: MEDICAL CENTER | Age: 73
End: 2018-12-27
Attending: INTERNAL MEDICINE
Payer: MEDICARE

## 2018-12-27 VITALS
TEMPERATURE: 97.6 F | HEART RATE: 70 BPM | RESPIRATION RATE: 16 BRPM | DIASTOLIC BLOOD PRESSURE: 90 MMHG | SYSTOLIC BLOOD PRESSURE: 135 MMHG | OXYGEN SATURATION: 97 %

## 2018-12-27 DIAGNOSIS — I77.89 TIBIAL ARTERY DISEASE (HCC): ICD-10-CM

## 2018-12-27 DIAGNOSIS — G81.94 LEFT HEMIPARESIS (HCC): ICD-10-CM

## 2018-12-27 DIAGNOSIS — Z86.73 HISTORY OF CEREBROVASCULAR ACCIDENT (CVA) IN ADULTHOOD: ICD-10-CM

## 2018-12-27 DIAGNOSIS — L97.922 CHRONIC ULCER OF LEFT LOWER EXTREMITY WITH FAT LAYER EXPOSED (HCC): Primary | ICD-10-CM

## 2018-12-27 DIAGNOSIS — L97.912 CHRONIC ULCER OF RIGHT LOWER EXTREMITY WITH FAT LAYER EXPOSED (HCC): ICD-10-CM

## 2018-12-27 PROCEDURE — 99212 OFFICE O/P EST SF 10 MIN: CPT

## 2018-12-27 PROCEDURE — 99212 OFFICE O/P EST SF 10 MIN: CPT | Performed by: NURSE PRACTITIONER

## 2018-12-27 ASSESSMENT — ENCOUNTER SYMPTOMS
FEVER: 0
VOMITING: 0
SENSORY CHANGE: 1
DEPRESSION: 0
DIARRHEA: 0
CHILLS: 0
COUGH: 0
SHORTNESS OF BREATH: 0
CONSTIPATION: 0
NAUSEA: 0
NERVOUS/ANXIOUS: 0

## 2018-12-27 NOTE — PATIENT INSTRUCTIONS
-Keep dressings clean, dry and covered while bathing. Only change dressings if they become over saturated, soiled or fall off.     -Avoid prolonged standing or sitting without elevating your legs.    -Should you experience any significant changes in your wound(s), such as infection (redness, swelling, localized heat, increased pain, fever > 101 F, chills) or have any questions regarding your home care instructions, please contact the wound center at [phone]. If after hours, contact your primary care physician or go to the hospital emergency room.

## 2018-12-27 NOTE — PROGRESS NOTES
Provider Encounter- Full Thickness wound      Patient seen in collaboration with wound care clinician,  Lo Archuleta RN    HISTORY OF PRESENT ILLNESS        START OF CARE IN CLINIC: 9/26/18    REFERRING PROVIDER: Trinidad Maguire MD     WOUND- Full thickness wounds x 4   LOCATION: Left medial ankle-eschar             Anterior left lower leg-eschar                                   Posterior left lower leg-full-thickness                                   Postero/lateral right lower leg-eschar   WOUND HISTORY: Anterior and posterior left lower leg wounds present since June or July 2018.  Left medial ankle and right posterolateral wounds present since September 2018.  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  Patient has been seen by vascular surgeon, Dr. Kena Terry.  Vascular intervention is not appropriate at this time.  Wounds have remained stable for several months.  Wife is treating these at home, Aquacel Ag to posterior left lower leg wound, Betadine to all other wounds.  Patient presents to the wound clinic every 2 weeks for reassessment.  Per Dr. Terry, no aggressive debridement.   PREVIOUS TREATMENT: Aquacel Ag to left posterior lower leg wound, Betadine to eschar of all other wounds   PERTINENT PMH: CVA with left hemiparesis, CAD, type 2 diabetes, CKD stage III     IMAGING: N/A   VASCULAR STUDIES:2/7/8 Lower extremity arterial duplex imaging, and ABIs      LAST  WOUND CULTURE:  DATE : 11/8/18-negative         DIABETES: Yes  TOBACCO USE: Never smoked      12/27: Patient placed on my schedule today for provider assessment.  Reviewed Dr. Terry's previous notes, no aggressive debridement to these wounds.  Wife is been treating the necrotic wounds with Betadine daily to keep dry and stable, and applying Aquacel Ag to left posterior lower leg wound 1-2 times per week.  All wounds are stable.  We will decrease frequency of clinic visits to 1 time per month.  Patient's wife understands  that if patient's wounds deteriorate in between appointments, she is to call the clinic immediately.    RESULTS:   Most recent A1c: 7.0 DATE 11/20/18    VASCULAR STUDIES  2/7/18-lower Extremity   Arterial Duplex Report  CONCLUSIONS   Right Lower Extremity-   Normal flow from the common femoral artery extending distally to the    popliteal artery. Flow within the posterior tibial artery is brisk and    multiphasic.   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     Left Lower Extremity-   Normal flow seen from the common femoral artery extending distally to the    popliteal artery.   Area of elevated velocities seen within the mid segment of the posterior    tibial artery. Consistent with >50% stenosis.   Occlusion of the anterior tibial artery at the mid-distal segment, minimal    flow reversal seen at the level of the ankle.    2/7/18-LE ART/PADMINI  Right lower extremity PADMINI 1.14  Left lower extremity PADMINI 1.06    Findings   Bilateral.    Doppler waveform of the common femoral artery is of high amplitude and    triphasic.    Doppler waveforms at the ankle are brisk and biphasic.    Absent flow within the Left VIVIENNE.     Arterial duplex scan was performed in accordance with lower extremity    arterial evaluation protocol - see separate report.  PAST MEDICAL HISTORY:   Past Medical History:   Diagnosis Date   • Acute respiratory failure with hypoxia ([facility]) 5/20/2017   • CAD (coronary artery disease)     GIOVANNA to RCA in '97, GIOVANNA X2 to LAD and GIOVANNA X2 to OM in '09   • Cancer ([facility])     2017; chemo lympoma   • Cataract    • Cerebrovascular accident (CVA) ([facility]) 12/30/2016    Left arm weakness  etiology of stroke not established, lymphoma discovered on MRI evaluation of stroke, L hemiparesis much worse after acute infectious illness in mid 2017, but no specific diagnosed recurrent neurological etiology, all at [facility]   •  CKD (chronic kidney disease) stage 3, GFR 30-59 ml/min ([facility])    • Controlled gout 2014   • Coronary atherosclerosis of native coronary artery     S/P PTCA (percutaneous transluminal coronary angioplasty), RCA, 5/1997, patent on cath 7/10/2009 at the time of interventions on his left anterior descending and circumflex coronary arteries   • Depression    • Diabetes ([facility])    • Enterococcal septicemia ([facility]) 8/12/2017   • Hypertension    • Hypokalemia 2012    controlled with combination of ACE inhibitor or ARB plus spironolactone   • Hypomagnesemia 08/12/2017    etiology uncertain   • Lymphoma ([facility]) 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 Barry   • NSTEMI (non-ST elevated myocardial infarction) ([facility]) 07/18/2017    complicating UTI with sepsis   • Pain    • Polyneuropathy in diabetes(357.2) 9/11/2013   • Septic shock ([facility]) 5/20/2017   • Skin ulcer of calf ([facility]) 2015    Right, Dr. Terry and wound care   • Stroke ([facility]) 2016    left sided weakness   • Urinary bladder disorder    • Urinary incontinence    • Wound of left leg 2012    Requiring surgery and debridment, Dr. Moore       PAST SURGICAL HISTORY:   Past Surgical History:   Procedure Laterality Date   • CYSTOSCOPY STENT PLACEMENT Left 2/12/2018    Procedure: CYSTOSCOPY  ;  Surgeon: Rey Barry M.D.;  Location: SURGERY [facility];  Service: Urology   • URETEROSCOPY Left 2/12/2018    Procedure: URETEROSCOPY- FLEXIBLE  ;  Surgeon: Rey Barry M.D.;  Location: SURGERY [facility];  Service: Urology   • LASERTRIPSY Left 2/12/2018    Procedure: LASERTRIPSY - LITHO  ;  Surgeon: Rey Barry M.D.;  Location: SURGERY [facility];  Service: Urology   • WOUND CLOSURE GENERAL  4/3/2012    Performed by GERHARD MOORE at SURGERY [facility]   • CARDIAC CATH  2009    Stents to LAD, Om   • DAGOBERTO BY LAPAROSCOPY  1998   • ANGIOPLASTY  1997    RCA followed by other stents as  noted above.    • CARDIAC CATH  1997    stent RCA   • CATARACT EXTRACTION WITH IOL      bilateral   • LITHOTRIPSY     • TONSILLECTOMY AND ADENOIDECTOMY          MEDICATIONS:   Current Outpatient Prescriptions   Medication   • HUMALOG KWIKPEN 100 UNIT/ML Solution Pen-injector injection   • losartan (COZAAR) 25 MG Tab   • allopurinol (ZYLOPRIM) 300 MG Tab   • Insulin Glargine (TOUJEO MAX SOLOSTAR) 300 UNIT/ML Solution Pen-injector   • tamsulosin (FLOMAX) 0.4 MG capsule   • acetaminophen (TYLENOL) 500 MG Tab   • fenofibrate (TRICOR) 145 MG Tab   • magnesium oxide (MAG-OX) 400 MG Tab   • atorvastatin (LIPITOR) 40 MG Tab   • clopidogrel (PLAVIX) 75 MG Tab   • spironolactone (ALDACTONE) 50 MG Tab   • fluoxetine (PROZAC) 40 MG capsule   • Dulaglutide (TRULICITY) 1.5 MG/0.5ML Solution Pen-injector   • metoprolol (TOPROL-XL) 200 MG XL tablet   • ascorbic acid (VITAMIN C) 500 MG tablet   • Cholecalciferol (VITAMIN D) 2000 units Cap   • aspirin 81 MG tablet   • CENTRUM SILVER PO     No current facility-administered medications for this visit.        ALLERGIES:    Allergies   Allergen Reactions   • Diphenhydramine Hcl Anxiety     Pt is able to tolerate  Mg benadryl with less anxiety   • Lorazepam Unspecified     Disorientation   • Ciprofloxacin      Rash,stomach ache         SOCIAL HISTORY:   Social History     Social History   • Marital status:      Spouse name: N/A   • Number of children: N/A   • Years of education: N/A     Social History Main Topics   • Smoking status: Never Smoker   • Smokeless tobacco: Never Used   • Alcohol use No   • Drug use: No   • Sexual activity: Not Currently     Partners: Female     Other Topics Concern   • Not on file     Social History Narrative   • No narrative on file       FAMILY HISTORY:   Family History   Problem Relation Age of Onset   • Heart Disease Father         CAD   • Diabetes Father    • Cancer Mother    • Psychiatry Mother         Depression   • Depression Mother    • Kidney  stones Brother    • Heart Disease Brother    • Psychiatry Brother         Depression   • Depression Brother    • Suicide Attempts Other    • Psychiatry Other         autism        REVIEW OF SYSTEMS:   Review of Systems   Constitutional: Negative for chills and fever.   Respiratory: Negative for cough and shortness of breath.    Cardiovascular: Negative for chest pain and leg swelling.   Gastrointestinal: Negative for constipation, diarrhea, nausea and vomiting.   Genitourinary: Negative for dysuria.   Musculoskeletal: Positive for joint pain.        Left hemiparesis, physical therapy twice per week  Uses cane or walker for ambulation   Neurological: Positive for sensory change.        Diminished sensation to the left side of his body due to CVA   Psychiatric/Behavioral: Negative for depression. The patient is not nervous/anxious.        PHYSICAL EXAMINATION:   /90   Pulse 70   Temp 36.4 °C (97.6 °F) (Temporal)   Resp 16   SpO2 97%   Physical Exam   Constitutional: He is oriented to person, place, and time and well-developed, well-nourished, and in no distress.   HENT:   Head: Normocephalic.   Eyes: Pupils are equal, round, and reactive to light.   Cardiovascular:   Right DP and PT pulses palpable  Unable to palpate left pedal pulses   Pulmonary/Chest: Effort normal.   Musculoskeletal: Normal range of motion.   Neurological: He is alert and oriented to person, place, and time.   Skin: Skin is warm.   Necrotic wounds to left medial ankle, anterior left lower leg, and right postero-lateral lower leg  Shallow open wound to left posterior lower leg  Refer to wound flowsheet   Psychiatric: Affect normal.       Wound Assessment- Refer to wound flowsheet      Wound photos, no debridement of any wounds today.                        PROCEDURE  No debridement of wounds today due to arterial insufficiency  Wound care completed by Kenny-necrotic wounds painted with Betadine, left posterior calf wound treated with  Aquacel Ag.    PATIENT EDUCATION  -Advised to go to ER for any increased redness, swelling, drainage or odor, or if patient develops fever, chills, nausea or vomiting.  -Importance of adequate nutrition for wound healing  -Increase protein intake (unless contraindicated by renal status)    ASSESSMENT AND PLAN:   1. Chronic ulcer of left lower extremity with fat layer exposed (HCC)  Full-thickness wound posterior lower leg, necrotic wounds to medial ankle and anterior lower leg, covered with stable eschar.  12/27: Wounds assessed in clinic today.  No debridement per instructions by Dr. Terry.  Aquacel Ag to posterior wound to manage exudate and bioburden.  Wife to continue wound care.  Patient to return to clinic 1 time per month for assessment.  Patient and his wife understand that he is to return to the clinic ASAP if wounds deteriorate.    2. Chronic ulcer of right lower extremity with fat layer exposed (HCC)  Necrotic wound to posterior/lateral lower leg, covered with stable eschar  12/27: Wound assessed in clinic today.  No debridement as per Dr. Terry's instructions.  Wound treated with Betadine.  Wife to continue wound care.  Patient to return to clinic 1 time per month.  See above    3.  Tibial artery disease  Occlusions of tibial arteries bilaterally.  Complicating factor, impairing wound healing potential.  Patient has been seen by vascular surgeon, not a candidate for surgery at this time.    4. (HCC) Left hemiparesis  Complicating factor.  Limited mobility.  Patient at risk for pressure injury and trauma.    5.. H/O Cerebrovascular accident (CVA) in adulthood  Resulting in left hemiparesis.      Please note that this dictation was created using voice recognition software. I have worked with technical experts from [employer] to optimize the interface.  I have made every reasonable attempt to correct obvious errors, but there may be errors of grammar and possibly content that I did not discover  before finalizing the note.

## 2019-01-02 ENCOUNTER — PATIENT MESSAGE (OUTPATIENT)
Dept: MEDICAL GROUP | Facility: MEDICAL CENTER | Age: 74
End: 2019-01-02

## 2019-01-02 DIAGNOSIS — G81.94 LEFT HEMIPARESIS (HCC): ICD-10-CM

## 2019-01-02 DIAGNOSIS — Z99.3 WHEELCHAIR DEPENDENT: ICD-10-CM

## 2019-01-03 NOTE — TELEPHONE ENCOUNTER
From: Av Bob  To: Mitchell Pantoja M.D.  Sent: 1/2/2019 6:01 PM PST  Subject: Non-Urgent Medical Question    Hi Av Mayberry would like a referral for re-evaluation for Physical Therapy sent to [facility]. His Medicare benefits should be starting over as of the first and he would like to continue going there. He currently goes twice a week to their [facility] which we have been paying for. I think he will probably continue with that program as he has made significant progress. An evaluation by the PT will help guide us in how he should continue.    The FAX number at the [facility] is: [phone]  The phone number is: [phone]    Thank you for your help with this issue.    Sincerely,  Jean-Claude Bob

## 2019-01-03 NOTE — PATIENT COMMUNICATION
1. Caller Name: Usha                                         Call Back Number: [phone] (home)       Patient approves a detailed voicemail message: no    2. SPECIFIC Action To Be Taken: Referral pending, please sign.    3. Diagnosis/Clinical Reason for Request: Continuation of Therapy    4. Specialty & Provider Name/Lab/Imaging Location: [facility]    5. Is appointment scheduled for requested order/referral: no    Patient was not informed they will receive a return phone call from the office ONLY if there are any questions before processing their request. Advised to call back if they haven't received a call from the referral department in 5 days.

## 2019-01-04 DIAGNOSIS — E11.65 TYPE 2 DIABETES MELLITUS WITH HYPERGLYCEMIA, WITH LONG-TERM CURRENT USE OF INSULIN (HCC): ICD-10-CM

## 2019-01-04 DIAGNOSIS — Z79.4 TYPE 2 DIABETES MELLITUS WITH HYPERGLYCEMIA, WITH LONG-TERM CURRENT USE OF INSULIN (HCC): ICD-10-CM

## 2019-01-04 RX ORDER — DULAGLUTIDE 1.5 MG/.5ML
INJECTION, SOLUTION SUBCUTANEOUS
Qty: 6 ML | Refills: 0 | Status: SHIPPED | OUTPATIENT
Start: 2019-01-04 | End: 2019-04-16 | Stop reason: SDUPTHER

## 2019-01-04 NOTE — TELEPHONE ENCOUNTER
Was the patient seen in the last year in this department? Yes    Does patient have an active prescription for medications requested? No     Received Request Via: Pharmacy     TRULICITY 1.5 MG/0.5ML Solution Pen-injector  INJECT 1.5 MG AS DIRECTED EVERY 7 DAYS

## 2019-01-08 DIAGNOSIS — E78.5 HYPERLIPIDEMIA ASSOCIATED WITH TYPE 2 DIABETES MELLITUS (HCC): ICD-10-CM

## 2019-01-08 DIAGNOSIS — E11.69 HYPERLIPIDEMIA ASSOCIATED WITH TYPE 2 DIABETES MELLITUS (HCC): ICD-10-CM

## 2019-01-08 RX ORDER — ATORVASTATIN CALCIUM 40 MG/1
TABLET, FILM COATED ORAL
Qty: 90 TAB | Refills: 2 | Status: ON HOLD | OUTPATIENT
Start: 2019-01-08 | End: 2019-07-01

## 2019-01-17 DIAGNOSIS — E11.65 TYPE 2 DIABETES MELLITUS WITH HYPERGLYCEMIA, WITH LONG-TERM CURRENT USE OF INSULIN (HCC): ICD-10-CM

## 2019-01-17 DIAGNOSIS — Z79.4 TYPE 2 DIABETES MELLITUS WITH HYPERGLYCEMIA, WITH LONG-TERM CURRENT USE OF INSULIN (HCC): ICD-10-CM

## 2019-01-17 RX ORDER — INSULIN GLARGINE 300 U/ML
INJECTION, SOLUTION SUBCUTANEOUS
Qty: 3 PEN | Refills: 3 | Status: SHIPPED | OUTPATIENT
Start: 2019-01-17 | End: 2019-01-23

## 2019-01-17 NOTE — TELEPHONE ENCOUNTER
Was the patient seen in the last year in this department?Yes- 10/24/18    Does patient have an active prescription for medications requested?No    Received Request Via:Pharmacy    TOUJEO SOLOSTAR 300 UNIT/ML Solution Pen-injector  INJECT 15 UNITS AS DIRECTED EVERY EVENING

## 2019-01-23 ENCOUNTER — OFFICE VISIT (OUTPATIENT)
Dept: ENDOCRINOLOGY | Facility: MEDICAL CENTER | Age: 74
End: 2019-01-23
Payer: MEDICARE

## 2019-01-23 VITALS
HEIGHT: 72 IN | WEIGHT: 200 LBS | BODY MASS INDEX: 27.09 KG/M2 | OXYGEN SATURATION: 100 % | DIASTOLIC BLOOD PRESSURE: 85 MMHG | SYSTOLIC BLOOD PRESSURE: 128 MMHG | HEART RATE: 65 BPM

## 2019-01-23 DIAGNOSIS — I10 ESSENTIAL HYPERTENSION: ICD-10-CM

## 2019-01-23 DIAGNOSIS — E78.2 MIXED HYPERLIPIDEMIA: ICD-10-CM

## 2019-01-23 DIAGNOSIS — E11.65 UNCONTROLLED TYPE 2 DIABETES MELLITUS WITH HYPERGLYCEMIA (HCC): ICD-10-CM

## 2019-01-23 PROCEDURE — 99215 OFFICE O/P EST HI 40 MIN: CPT | Performed by: INTERNAL MEDICINE

## 2019-01-23 RX ORDER — DAPAGLIFLOZIN 5 MG/1
1 TABLET, FILM COATED ORAL DAILY
Qty: 30 TAB | Refills: 3 | Status: SHIPPED | OUTPATIENT
Start: 2019-01-23 | End: 2019-04-01

## 2019-01-24 ENCOUNTER — HOSPITAL ENCOUNTER (OUTPATIENT)
Dept: LAB | Facility: MEDICAL CENTER | Age: 74
End: 2019-01-24
Attending: INTERNAL MEDICINE
Payer: MEDICARE

## 2019-01-24 DIAGNOSIS — N18.30 CKD (CHRONIC KIDNEY DISEASE), STAGE III (HCC): ICD-10-CM

## 2019-01-24 LAB
ANION GAP SERPL CALC-SCNC: 8 MMOL/L (ref 0–11.9)
BUN SERPL-MCNC: 32 MG/DL (ref 8–22)
CALCIUM SERPL-MCNC: 10.2 MG/DL (ref 8.5–10.5)
CHLORIDE SERPL-SCNC: 102 MMOL/L (ref 96–112)
CO2 SERPL-SCNC: 27 MMOL/L (ref 20–33)
CREAT SERPL-MCNC: 2 MG/DL (ref 0.5–1.4)
CREAT UR-MCNC: 63.6 MG/DL
GLUCOSE SERPL-MCNC: 125 MG/DL (ref 65–99)
MICROALBUMIN UR-MCNC: 8.1 MG/DL
MICROALBUMIN/CREAT UR: 127 MG/G (ref 0–30)
POTASSIUM SERPL-SCNC: 4.4 MMOL/L (ref 3.6–5.5)
SODIUM SERPL-SCNC: 137 MMOL/L (ref 135–145)

## 2019-01-24 PROCEDURE — 82306 VITAMIN D 25 HYDROXY: CPT

## 2019-01-24 PROCEDURE — 80048 BASIC METABOLIC PNL TOTAL CA: CPT

## 2019-01-24 PROCEDURE — 36415 COLL VENOUS BLD VENIPUNCTURE: CPT

## 2019-01-24 PROCEDURE — 82570 ASSAY OF URINE CREATININE: CPT

## 2019-01-24 PROCEDURE — 82043 UR ALBUMIN QUANTITATIVE: CPT

## 2019-01-25 ENCOUNTER — NON-PROVIDER VISIT (OUTPATIENT)
Dept: WOUND CARE | Facility: MEDICAL CENTER | Age: 74
End: 2019-01-25
Attending: INTERNAL MEDICINE
Payer: MEDICARE

## 2019-01-25 LAB — 25(OH)D3 SERPL-MCNC: 29 NG/ML (ref 30–100)

## 2019-01-25 PROCEDURE — 99211 OFF/OP EST MAY X REQ PHY/QHP: CPT

## 2019-01-25 NOTE — PATIENT INSTRUCTIONS
Should you experience any significant changes in your wound(s) such as infection (redness, swelling, localized heat, increased pain, fever >101 F, chills) or have any questions regarding your home care instructions, please contact the wound center [phone]. If after hours, contact your primary care physician or go the hospital emergency room.

## 2019-01-28 DIAGNOSIS — I15.2 HYPERTENSION ASSOCIATED WITH DIABETES (HCC): ICD-10-CM

## 2019-01-28 DIAGNOSIS — E11.59 HYPERTENSION ASSOCIATED WITH DIABETES (HCC): ICD-10-CM

## 2019-01-28 RX ORDER — LOSARTAN POTASSIUM 25 MG/1
25 TABLET ORAL
Qty: 90 TAB | Refills: 2 | Status: SHIPPED | OUTPATIENT
Start: 2019-01-28 | End: 2019-02-04 | Stop reason: SDUPTHER

## 2019-01-28 NOTE — PROGRESS NOTES
New Patient Consult Note  Primary care physician: Mitchell Pantoja M.D.    Reason for consult: Management of Uncontrolled Type 2 Diabetes    HPI:  Av Bob is a 73 y.o. old patient who comes in today for evaluation of above stated problem.    Most Recent HbA1c:   Lab Results   Component Value Date/Time    HBA1C 7.0 (H) 11/20/2018 12:55 PM        Current Diabetes Regimen:  Basal Insulin: Lantus(Insulin glargine)  15 units sc once daily   Prandial Insulin: Humalog 5-15 units plus sliding scale  sc with  breakfast lunch dinner   GLP-1RA: trulicity 0.75 mg weekly.       ROS:  Constitutional: No weight loss  Cardiac: No palpitations or racing heart  Resp: No shortness of breath  Neuro: No numbness or tinging in feet  Endo: No heat or cold intolerance, no polyuria or polydipsia  All other systems were reviewed and were negative.    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, without long-term current use of insulin 12/30/2016     Priority: Medium   • H/O Cerebrovascular accident (CVA) in adulthood 12/30/2016     Priority: Medium   • Coronary artery disease involving native coronary artery of native heart without angina pectoris 12/30/2016     Priority: Medium   • (HCC) Hyperlipidemia associated with type 2 diabetes mellitus 12/13/2011     Priority: Medium   • GERD with esophagitis 10/19/2018     Priority: Low   • Recurrent UTI 04/26/2018     Priority: Low   • (HCC) Left hemiparesis 12/27/2017     Priority: Low   • (HCC) Diabetic nephropathy associated with type 2 diabetes mellitus 11/30/2017     Priority: Low   • Psychophysiological insomnia 11/21/2017     Priority: Low   • (HCC) Moderate episode  of recurrent major depressive disorder 11/07/2017     Priority: Low   • Wheelchair dependent 11/07/2017     Priority: Low   • Normocytic hypochromic anemia 05/20/2017     Priority: Low   • H/O non-Hodgkin's lymphoma 05/08/2017     Priority: Low   • (HCC) CKD (chronic kidney disease) stage 3, GFR 30-59 ml/min 08/25/2016     Priority: Low   • Idiopathic chronic gout of multiple sites without tophus 01/28/2014     Priority: Low   • Chronic ulcer of left lower extremity with fat layer exposed (HCC) 12/27/2018   • Chronic ulcer of right lower extremity with fat layer exposed ([facility]) 12/27/2018   • Tibial artery disease ([facility]) 12/27/2018       Past Surgical History:  Past Surgical History:   Procedure Laterality Date   • CYSTOSCOPY STENT PLACEMENT Left 2/12/2018    Procedure: CYSTOSCOPY  ;  Surgeon: Rey Barry M.D.;  Location: SURGERY [facility];  Service: Urology   • URETEROSCOPY Left 2/12/2018    Procedure: URETEROSCOPY- FLEXIBLE  ;  Surgeon: Rey Barry M.D.;  Location: SURGERY [facility];  Service: Urology   • LASERTRIPSY Left 2/12/2018    Procedure: LASERTRIPSY - LITHO  ;  Surgeon: Rey Barry M.D.;  Location: SURGERY [facility];  Service: Urology   • WOUND CLOSURE GENERAL  4/3/2012    Performed by GERHARD GILBERT at SURGERY SAME DAY [facility] ORS   • CARDIAC CATH  2009    Stents to LAD, Om   • DAGOBERTO BY LAPAROSCOPY  1998   • ANGIOPLASTY  1997    RCA followed by other stents as noted above.    • CARDIAC CATH  1997    stent RCA   • CATARACT EXTRACTION WITH IOL      bilateral   • LITHOTRIPSY     • TONSILLECTOMY AND ADENOIDECTOMY         Allergies:  Diphenhydramine hcl; Lorazepam; and Ciprofloxacin    Social History:  Social History     Social History   • Marital status:      Spouse name: N/A   • Number of children: N/A   • Years of education: N/A     Occupational History   • Not on file.     Social History Main Topics   • Smoking status: Never Smoker   • Smokeless tobacco: Never Used   •  Alcohol use No   • Drug use: No   • Sexual activity: Not Currently     Partners: Female     Other Topics Concern   • Not on file     Social History Narrative   • No narrative on file       Family History:  Family History   Problem Relation Age of Onset   • Heart Disease Father         CAD   • Diabetes Father    • Cancer Mother    • Psychiatry Mother         Depression   • Depression Mother    • Kidney stones Brother    • Heart Disease Brother    • Psychiatry Brother         Depression   • Depression Brother    • Suicide Attempts Other    • Psychiatry Other         autism       Medications:    Current Outpatient Prescriptions:   •  Insulin Glargine (TOUJEO MAX SOLOSTAR) 300 UNIT/ML Solution Pen-injector, Inject 45 Units as instructed every bedtime., Disp: 9 PEN, Rfl: 3  •  Dapagliflozin Propanediol (FARXIGA) 5 MG Tab, Take 1 tablet by mouth every day., Disp: 30 Tab, Rfl: 3  •  MAGNESIUM-OXIDE 400 (241.3 Mg) MG Tab tablet, TAKE 1 TABLET BY MOUTH THREE TIMES DAILY, Disp: 270 Tab, Rfl: 0  •  atorvastatin (LIPITOR) 40 MG Tab, TAKE 1 TABLET BY MOUTH EVERY DAY, Disp: 90 Tab, Rfl: 2  •  TRULICITY 1.5 MG/0.5ML Solution Pen-injector, INJECT 1.5 MG AS DIRECTED EVERY 7 DAYS, Disp: 6 mL, Rfl: 0  •  HUMALOG KWIKPEN 100 UNIT/ML Solution Pen-injector injection, INJECT 35 UNITS SUBCUTANEOUSLY 3 TIMES DAILY BEFORE MEALS, Disp: 96 mL, Rfl: 6  •  losartan (COZAAR) 25 MG Tab, Take 1 Tab by mouth every day., Disp: 90 Tab, Rfl: 0  •  allopurinol (ZYLOPRIM) 300 MG Tab, Take 150 mg by mouth every day., Disp: , Rfl:   •  fenofibrate (TRICOR) 145 MG Tab, Take 1 Tab by mouth every day., Disp: 90 Tab, Rfl: 3  •  clopidogrel (PLAVIX) 75 MG Tab, Take 1 Tab by mouth every day., Disp: 90 Tab, Rfl: 2  •  spironolactone (ALDACTONE) 50 MG Tab, Take 1 Tab by mouth every day., Disp: 90 Tab, Rfl: 1  •  fluoxetine (PROZAC) 40 MG capsule, TAKE 1 CAPSULE BY MOUTH EVERY DAY, Disp: 90 Cap, Rfl: 3  •  metoprolol (TOPROL-XL) 200 MG XL tablet, Take 1 Tab by  mouth every evening., Disp: 90 Tab, Rfl: 3  •  ascorbic acid (VITAMIN C) 500 MG tablet, Take 500 mg by mouth every day., Disp: , Rfl:   •  Cholecalciferol (VITAMIN D) 2000 units Cap, Take 1 Cap by mouth 2 Times a Day., Disp: , Rfl:   •  aspirin 81 MG tablet, Take 81 mg by mouth every day., Disp: , Rfl:   •  CENTRUM SILVER PO, Take 1 Tab by mouth every day., Disp: , Rfl:   •  tamsulosin (FLOMAX) 0.4 MG capsule, Take 0.4 mg by mouth every evening., Disp: , Rfl:   •  acetaminophen (TYLENOL) 500 MG Tab, Take 1,000 mg by mouth every 6 hours as needed for Mild Pain., Disp: , Rfl:     Labs: Reviewed    Physical Examination:  Vital signs: /85 (BP Location: Right arm, Patient Position: Sitting)   Pulse 65   Ht 1.829 m (6')   Wt 90.7 kg (200 lb)   SpO2 100%   BMI 27.12 kg/m²  Body mass index is 27.12 kg/m².  General: No apparent distress, cooperative  Eyes: No scleral icterus or discharge  ENMT: Normal on external inspection of nose, lips, normal thyroid exam  Neck: No abnormal masses on inspection  Resp: Normal effort, clear to auscultation bilaterally   CVS: Regular rate and rhythm, S1 S2 normal, no murmur   Extremities: No edema  Abdomen: abdominal obesity present  Neuro: Alert and oriented  Skin: No rash  Psych: Normal mood and affect, intact memory and able to make informed decisions    Assessment and Plan:    1. Uncontrolled type 2 diabetes mellitus with hyperglycemia (HCC)  Uncontrolled;   Increase   - Insulin Glargine (TOUJEO MAX SOLOSTAR) 300 UNIT/ML Solution Pen-injector; Inject 45 Units as instructed every bedtime.  Dispense: 9 PEN; Refill: 3  Add   - Dapagliflozin Propanediol (FARXIGA) 5 MG Tab; Take 1 tablet by mouth every day.    Increase  trulicty to 1.5 mg weekly.     Humalo units for 50 above 150.     2. Essential hypertension  Controlled.     3. Mixed hyperlipidemia  On statins; monitor closely.     Return in about 1 month (around 2019).    Total face to face time spent with patient  equals 60 minutes. 35/60 minutes were spent on counseling the patient about the mechanism of action, side effects and benefits of GLP-1 therapy, SGLT-Inhibitor therapy. I also counseled the patient on hypoglycemia recognition and management.     Thank you for allowing me to participate in the care of this patient.    Fran Rasheed M.D.  01/28/19    CC:   Mitchell Pantoja M.D.    This note was created using voice recognition software (Dragon). The accuracy of the dictation is limited by the abilities of the software. I have reviewed the note prior to signing, however some errors in grammar and context are still possible. If you have any questions related to this note please do not hesitate to contact our office.

## 2019-01-31 ENCOUNTER — OFFICE VISIT (OUTPATIENT)
Dept: NEPHROLOGY | Facility: MEDICAL CENTER | Age: 74
End: 2019-01-31
Payer: MEDICARE

## 2019-01-31 VITALS
SYSTOLIC BLOOD PRESSURE: 122 MMHG | RESPIRATION RATE: 14 BRPM | WEIGHT: 200 LBS | BODY MASS INDEX: 27.09 KG/M2 | HEIGHT: 72 IN | OXYGEN SATURATION: 99 % | HEART RATE: 68 BPM | TEMPERATURE: 97.4 F | DIASTOLIC BLOOD PRESSURE: 74 MMHG

## 2019-01-31 DIAGNOSIS — N18.30 CKD (CHRONIC KIDNEY DISEASE) STAGE 3, GFR 30-59 ML/MIN (HCC): ICD-10-CM

## 2019-01-31 DIAGNOSIS — E11.29 MICROALBUMINURIA DUE TO TYPE 2 DIABETES MELLITUS (HCC): ICD-10-CM

## 2019-01-31 DIAGNOSIS — I10 ESSENTIAL HYPERTENSION: ICD-10-CM

## 2019-01-31 DIAGNOSIS — E55.9 VITAMIN D DEFICIENCY: ICD-10-CM

## 2019-01-31 DIAGNOSIS — R80.9 MICROALBUMINURIA DUE TO TYPE 2 DIABETES MELLITUS (HCC): ICD-10-CM

## 2019-01-31 PROCEDURE — 99214 OFFICE O/P EST MOD 30 MIN: CPT | Performed by: INTERNAL MEDICINE

## 2019-01-31 ASSESSMENT — ENCOUNTER SYMPTOMS
CHILLS: 0
ABDOMINAL PAIN: 0
NECK PAIN: 0
DIARRHEA: 0
MYALGIAS: 0
FLANK PAIN: 0
WHEEZING: 0
HEARTBURN: 0
ORTHOPNEA: 0
NAUSEA: 0
FEVER: 0
BACK PAIN: 0
COUGH: 0
WEIGHT LOSS: 0
SHORTNESS OF BREATH: 0
EYES NEGATIVE: 1
HEMOPTYSIS: 0
VOMITING: 0
PALPITATIONS: 0

## 2019-01-31 NOTE — PROGRESS NOTES
Subjective:      Av Bob is a 73 y.o. male who presents with Follow-Up and Chronic Kidney Disease            Chronic Kidney Disease   Pertinent negatives include no abdominal pain, chest pain, chills, coughing, fever, myalgias, nausea, neck pain or vomiting.     Av is coming today for f/u of CKD III, microalbuminuria  Has long term hx/of Nephrolithiasis -f/u with Urologist -  Baseline creatinine level stable at 2  Diagnosed with left renal obstructing kidney stone -treated with lithotripsy  Doing well , no complaints  No difficulties to urinate.No dysuria No flank pain  (+) urinary retention -prescribed Flomax -can not tolerate due to severe dizziness  HTN: BP well controlled    Review of Systems   Constitutional: Positive for malaise/fatigue. Negative for chills, fever and weight loss.   HENT: Negative.    Eyes: Negative.    Respiratory: Negative for cough, hemoptysis, shortness of breath and wheezing.    Cardiovascular: Negative for chest pain, palpitations, orthopnea and leg swelling.   Gastrointestinal: Negative for abdominal pain, diarrhea, heartburn, nausea and vomiting.   Genitourinary: Negative for dysuria, flank pain, frequency, hematuria and urgency.   Musculoskeletal: Negative for back pain, myalgias and neck pain.   Skin: Negative.    Neurological:        Hx/of CVA, on wheel chair   All other systems reviewed and are negative.         Objective:     /74 (BP Location: Right arm, Patient Position: Sitting)   Pulse 68   Temp 36.3 °C (97.4 °F)   Resp 14   Ht 1.829 m (6')   Wt 90.7 kg (200 lb)   SpO2 99%   BMI 27.12 kg/m²      Physical Exam   Constitutional: He is oriented to person, place, and time. He appears well-developed and well-nourished. No distress.   HENT:   Head: Normocephalic and atraumatic.   Nose: Nose normal.   Mouth/Throat: Oropharynx is clear and moist.   Eyes: Conjunctivae are normal. Pupils are equal, round, and reactive to light.   Neck: Normal range  of motion. Neck supple.   Cardiovascular: Normal rate and regular rhythm.  Exam reveals no gallop and no friction rub.    Pulmonary/Chest: Effort normal and breath sounds normal. No respiratory distress. He has no wheezes. He has no rales.   Abdominal: Soft. Bowel sounds are normal. He exhibits no distension. There is no tenderness.   Musculoskeletal: He exhibits no edema.   Neurological: He is alert and oriented to person, place, and time. Left sided weakness  Nursing note and vitals reviewed.            Laboratory results reviewed:  Lab Results   Component Value Date/Time    CREATININE 2.00 (H) 01/24/2019 01:20 PM    CREATININE 1.4 05/28/2008 05:42 PM    POTASSIUM 4.4 01/24/2019 01:20 PM       Assessment/Plan:     1. CKD (chronic kidney disease), stage III      Creat slightly worse from baseline  baseline -to monitor closely      2. Essential hypertension, benign      Low BP -to reduce Losartan dose    3. Microalbuminuria due to type 2 diabetes mellitus (CMS-HCC)      Microalb/creat ratio at 0.12 -on ARB -to monitor      4. Vitamin D deficiency      Vit D and PTH WNL -continue supplementation      5. Nephrolithiasis      s/p lithotripsy           Recs; Continue current treatment             To drink plenty of fluids              Avoid NSAID's              Monitor BP              F/u in Urology Clinic              F/u in 4 months with BMP, vit D, urine microalb/creat ratio                                                                      Physical Exam

## 2019-02-01 ENCOUNTER — PATIENT MESSAGE (OUTPATIENT)
Dept: MEDICAL GROUP | Facility: MEDICAL CENTER | Age: 74
End: 2019-02-01

## 2019-02-01 DIAGNOSIS — I15.2 HYPERTENSION ASSOCIATED WITH DIABETES (HCC): ICD-10-CM

## 2019-02-01 DIAGNOSIS — E11.59 HYPERTENSION ASSOCIATED WITH DIABETES (HCC): ICD-10-CM

## 2019-02-04 RX ORDER — LOSARTAN POTASSIUM 25 MG/1
25 TABLET ORAL 2 TIMES DAILY
Qty: 180 TAB | Refills: 0 | Status: SHIPPED | OUTPATIENT
Start: 2019-02-04 | End: 2019-04-28 | Stop reason: SDUPTHER

## 2019-02-13 ENCOUNTER — TELEPHONE (OUTPATIENT)
Dept: ENDOCRINOLOGY | Facility: MEDICAL CENTER | Age: 74
End: 2019-02-13

## 2019-02-13 DIAGNOSIS — Z79.4 TYPE 2 DIABETES MELLITUS WITH DIABETIC NEUROPATHY, WITH LONG-TERM CURRENT USE OF INSULIN (HCC): ICD-10-CM

## 2019-02-13 DIAGNOSIS — E11.40 TYPE 2 DIABETES MELLITUS WITH DIABETIC NEUROPATHY, WITH LONG-TERM CURRENT USE OF INSULIN (HCC): ICD-10-CM

## 2019-02-13 NOTE — TELEPHONE ENCOUNTER
1. Caller Name: Usha                                               Call Back Number: [phone] (home)           Patient approves a detailed voicemail message: N\A      Usha called needing an updated script for the Humalog sent to [facility]. The directions have changed. Usha states patient is injecting 15-20 units daily. Patient was last seen on 1/23/19.    Please advise

## 2019-02-14 ENCOUNTER — HOSPITAL ENCOUNTER (OUTPATIENT)
Dept: LAB | Facility: MEDICAL CENTER | Age: 74
End: 2019-02-14
Attending: FAMILY MEDICINE
Payer: MEDICARE

## 2019-02-14 DIAGNOSIS — E11.69 HYPERLIPIDEMIA ASSOCIATED WITH TYPE 2 DIABETES MELLITUS (HCC): ICD-10-CM

## 2019-02-14 DIAGNOSIS — E78.5 HYPERLIPIDEMIA ASSOCIATED WITH TYPE 2 DIABETES MELLITUS (HCC): ICD-10-CM

## 2019-02-14 LAB
CHOLEST SERPL-MCNC: 151 MG/DL (ref 100–199)
FASTING STATUS PATIENT QL REPORTED: NORMAL
HDLC SERPL-MCNC: 32 MG/DL
LDLC SERPL CALC-MCNC: 92 MG/DL
TRIGL SERPL-MCNC: 136 MG/DL (ref 0–149)

## 2019-02-14 PROCEDURE — 80061 LIPID PANEL: CPT

## 2019-02-14 PROCEDURE — 36415 COLL VENOUS BLD VENIPUNCTURE: CPT

## 2019-02-19 ENCOUNTER — OFFICE VISIT (OUTPATIENT)
Dept: MEDICAL GROUP | Facility: MEDICAL CENTER | Age: 74
End: 2019-02-19
Payer: MEDICARE

## 2019-02-19 VITALS
SYSTOLIC BLOOD PRESSURE: 110 MMHG | HEART RATE: 62 BPM | HEIGHT: 72 IN | DIASTOLIC BLOOD PRESSURE: 80 MMHG | TEMPERATURE: 97.5 F | OXYGEN SATURATION: 99 % | BODY MASS INDEX: 27.12 KG/M2

## 2019-02-19 DIAGNOSIS — Z12.11 COLON CANCER SCREENING: ICD-10-CM

## 2019-02-19 DIAGNOSIS — E11.59 HYPERTENSION ASSOCIATED WITH DIABETES (HCC): ICD-10-CM

## 2019-02-19 DIAGNOSIS — E11.69 HYPERLIPIDEMIA ASSOCIATED WITH TYPE 2 DIABETES MELLITUS (HCC): ICD-10-CM

## 2019-02-19 DIAGNOSIS — L97.922 CHRONIC ULCER OF LEFT LOWER EXTREMITY WITH FAT LAYER EXPOSED (HCC): ICD-10-CM

## 2019-02-19 DIAGNOSIS — I15.2 HYPERTENSION ASSOCIATED WITH DIABETES (HCC): ICD-10-CM

## 2019-02-19 DIAGNOSIS — N39.0 RECURRENT UTI: ICD-10-CM

## 2019-02-19 DIAGNOSIS — E78.5 HYPERLIPIDEMIA ASSOCIATED WITH TYPE 2 DIABETES MELLITUS (HCC): ICD-10-CM

## 2019-02-19 DIAGNOSIS — N39.42 URINARY INCONTINENCE WITHOUT SENSORY AWARENESS: ICD-10-CM

## 2019-02-19 DIAGNOSIS — Z23 NEED FOR VACCINATION: ICD-10-CM

## 2019-02-19 DIAGNOSIS — L97.912 CHRONIC ULCER OF RIGHT LOWER EXTREMITY WITH FAT LAYER EXPOSED (HCC): ICD-10-CM

## 2019-02-19 DIAGNOSIS — E11.3293 CONTROLLED TYPE 2 DIABETES MELLITUS WITH BOTH EYES AFFECTED BY MILD NONPROLIFERATIVE RETINOPATHY WITHOUT MACULAR EDEMA, WITHOUT LONG-TERM CURRENT USE OF INSULIN (HCC): ICD-10-CM

## 2019-02-19 DIAGNOSIS — N18.30 CKD (CHRONIC KIDNEY DISEASE) STAGE 3, GFR 30-59 ML/MIN (HCC): ICD-10-CM

## 2019-02-19 DIAGNOSIS — E11.21 DIABETIC NEPHROPATHY ASSOCIATED WITH TYPE 2 DIABETES MELLITUS (HCC): ICD-10-CM

## 2019-02-19 PROCEDURE — 99214 OFFICE O/P EST MOD 30 MIN: CPT | Performed by: FAMILY MEDICINE

## 2019-02-19 NOTE — LETTER
Amanda Huff [facility]  Mitchell Pantoja M.D.  [address]  [place] [address]  Fax: [phone]   Authorization for Release/Disclosure of   Protected Health Information   Name: AV MADRIGAL : 1945 SSN: [id number]   Address: [facility] [address]  [place] [address] Phone:    [phone] (home)    I authorize the entity listed below to release/disclose the PHI below to:   [facility]/Mitchell Pantoja M.D. and Mitchell Pantoja M.D.   Provider or Entity Name:  Ale Mckenzie [facility]   Address   City, State, [facility]   Phone:      Fax:     Reason for request: continuity of care   Information to be released:    [  ] LAST COLONOSCOPY,  including any PATH REPORT and follow-up  [  ] LAST FIT/COLOGUARD RESULT [  ] LAST DEXA  [  ] LAST MAMMOGRAM  [  ] LAST PAP  [  ] LAST LABS [X] RETINA EXAM REPORT  [  ] IMMUNIZATION RECORDS  [  ] Release all info      [  ] Check here and initial the line next to each item to release ALL health information INCLUDING  _____ Care and treatment for drug and / or alcohol abuse  _____ HIV testing, infection status, or AIDS  _____ Genetic Testing    DATES OF SERVICE OR TIME PERIOD TO BE DISCLOSED: _____________  I understand and acknowledge that:  * This Authorization may be revoked at any time by you in writing, except if your health information has already been used or disclosed.  * Your health information that will be used or disclosed as a result of you signing this authorization could be re-disclosed by the recipient. If this occurs, your re-disclosed health information may no longer be protected by State or Federal laws.  * You may refuse to sign this Authorization. Your refusal will not affect your ability to obtain treatment.  * This Authorization becomes effective upon signing and will  on (date) __________.      If no date is indicated, this Authorization will  one (1) year from the signature date.    Name: Av Allen  Lisbeth    Signature:   Date:     2/19/2019       PLEASE FAX REQUESTED RECORDS BACK TO: [phone]

## 2019-02-20 DIAGNOSIS — Z79.4 TYPE 2 DIABETES MELLITUS WITH HYPERGLYCEMIA, WITH LONG-TERM CURRENT USE OF INSULIN (HCC): ICD-10-CM

## 2019-02-20 DIAGNOSIS — E11.65 TYPE 2 DIABETES MELLITUS WITH HYPERGLYCEMIA, WITH LONG-TERM CURRENT USE OF INSULIN (HCC): ICD-10-CM

## 2019-02-20 NOTE — TELEPHONE ENCOUNTER
1. Was the patient seen in the last year in this department? Yes  **LOV 1/23/19    Does patient have an active prescription for medications requested? No    Received Request Via: Pharmacy        2. What supplies does the patient need? Test strips    3. What brand of meter does the patient use?  ONETOUCH ULTRA BLUE    4. How many times a day is the patient testing? 5    Dx: E11.65, is using insulin. Last A1c =   Lab Results   Component Value Date/Time    HBA1C 7.0 (H) 11/20/2018 12:55 PM    HBA1C 5.9 10/24/2018 12:10 PM   .

## 2019-02-21 ENCOUNTER — NON-PROVIDER VISIT (OUTPATIENT)
Dept: WOUND CARE | Facility: MEDICAL CENTER | Age: 74
End: 2019-02-21
Attending: INTERNAL MEDICINE
Payer: MEDICARE

## 2019-02-21 PROCEDURE — 97602 WOUND(S) CARE NON-SELECTIVE: CPT

## 2019-02-21 NOTE — ASSESSMENT & PLAN NOTE
New problem for my medical evaluation, uncontrolled, patient states that he has chronic urinary incontinence due to decreased sensation in his genitalia system, particularly his penis and bladder.      Therefore, patient does not sense any signs or symptoms of UTI.  ROS is NEGATIVE for fevers, chills, rigors, flank pain, dysuria, hematuria, pyuria, polyuria, increased frequency of urination, diarrhea, constipation.    Patient does not self catheterize, and does not go to the restroom a regular basis.  Rather, patient depends on urinating into his adult diapers.  Patient drinks less than 64 ounces of fluids per day.  I suggested to patient that he try to urinate on a regular basis.  In this manner, patient could decrease his risk of infection by preventing bacterial load accumulation in his diaper, as well as regularly clearing his urethral tract.

## 2019-02-21 NOTE — PROGRESS NOTES
Left all wound LDA's open even if resolved at this time due to patient does come back in a month and the same wound will be reopened. If they remain resolved next time, please complete LDA.

## 2019-02-21 NOTE — ASSESSMENT & PLAN NOTE
Patient and I discussed recent labs (see below; mixed dyslipidemia with low HDL, mildly uncontrolled).    Patient and I then discussed necessary dietary changes to make to address dyslipidemia.  Patient is currently taking cholesterol lowering medication: Atorvastatin.  Patient verbalized understanding.    ROS is NEGATIVE for dizziness, generalized weakness/fatigue, vision/hearing changes, jaw pain/paresthesias, BUE pain/paresthesias/numbness/weakness, chest pain/pressure, palpitations, dyspnea, RUQ abdominal pain, oliguria/anuria, BLE edema.    Lab Results   Component Value Date/Time    CHOLSTRLTOT 151 02/14/2019 11:05 AM    LDL 92 02/14/2019 11:05 AM    HDL 32 (A) 02/14/2019 11:05 AM    TRIGLYCERIDE 136 02/14/2019 11:05 AM

## 2019-02-21 NOTE — PATIENT INSTRUCTIONS
Should you experience any significant changes in your wound(s) such as infection (redness, swelling, localized heat, increased pain, fever >101 F, chills) or have any questions regarding your home care instructions, please contact the wound center [phone]. If after hours, contact your primary care physician or go the hospital emergency room.  Keep dressing clean and dry and cover while bathing. Only change dressing if over saturated, soiled or its falling off.

## 2019-02-22 NOTE — ASSESSMENT & PLAN NOTE
Established problem, uncontrolled, patient to continue following up with.  I did provide local wound care for both lesions on his right lower extremity and left lower extremity with chlorhexidine swabs, topical antibiotics, and nonadherent bandages.

## 2019-02-22 NOTE — ASSESSMENT & PLAN NOTE
Chronic, uncontrolled, see notes from same day service 2/19/2019 re: urinary incontinence without sensory awareness

## 2019-02-22 NOTE — ASSESSMENT & PLAN NOTE
Chronic, stable, well-controlled based on labs, as below.    Component      Latest Ref Rng & Units 6/6/2018 8/15/2018 9/12/2018 9/21/2018   Sodium      135 - 145 mmol/L 135 139 136 136   Potassium      3.6 - 5.5 mmol/L 3.8 3.8 4.3 4.9   Chloride      96 - 112 mmol/L 100 105 102 104   Co2      20 - 33 mmol/L 22 24 25 28   Anion Gap      0.0 - 11.9 13.0 (H) 10.0 9.0 4.0   Glucose      65 - 99 mg/dL 179 (H) 134 (H) 86 107 (H)   Bun      8 - 22 mg/dL 37 (H) 31 (H) 50 (H) 37 (H)   Creatinine      0.50 - 1.40 mg/dL 1.96 (H) 1.98 (H) 2.56 (H) 1.97 (H)   Calcium      8.5 - 10.5 mg/dL 9.1 9.4 9.7 9.7   AST(SGOT)      12 - 45 U/L       ALT(SGPT)      2 - 50 U/L       Alkaline Phosphatase      30 - 99 U/L       Total Bilirubin      0.1 - 1.5 mg/dL       Albumin      3.2 - 4.9 g/dL       Total Protein      6.0 - 8.2 g/dL       Globulin      1.9 - 3.5 g/dL       A-G Ratio      g/dL       GFR If African American      >60 mL/min/1.73 m 2 41 (A) 40 (A) 30 (A) 41 (A)   GFR If Non African American      >60 mL/min/1.73 m 2 34 (A) 33 (A) 25 (A) 34 (A)     Component      Latest Ref Rng & Units 11/19/2018 11/20/2018 11/21/2018 1/24/2019   Sodium      135 - 145 mmol/L 137 137 138 137   Potassium      3.6 - 5.5 mmol/L 4.2 4.2 3.8 4.4   Chloride      96 - 112 mmol/L 102 103 107 102   Co2      20 - 33 mmol/L 22 27 23 27   Anion Gap      0.0 - 11.9 13.0 (H) 7.0 8.0 8.0   Glucose      65 - 99 mg/dL 184 (H) 161 (H) 142 (H) 125 (H)   Bun      8 - 22 mg/dL 40 (H) 41 (H) 42 (H) 32 (H)   Creatinine      0.50 - 1.40 mg/dL 1.99 (H) 2.19 (H) 2.04 (H) 2.00 (H)   Calcium      8.5 - 10.5 mg/dL 9.8 9.6 8.6 10.2   AST(SGOT)      12 - 45 U/L  18     ALT(SGPT)      2 - 50 U/L  20     Alkaline Phosphatase      30 - 99 U/L  53     Total Bilirubin      0.1 - 1.5 mg/dL  0.7     Albumin      3.2 - 4.9 g/dL  3.8     Total Protein      6.0 - 8.2 g/dL  6.0     Globulin      1.9 - 3.5 g/dL  2.2     A-G Ratio      g/dL  1.7     GFR If       >60  mL/min/1.73 m 2 40 (A) 36 (A) 39 (A) 40 (A)   GFR If Non African American      >60 mL/min/1.73 m 2 33 (A) 30 (A) 32 (A) 33 (A)

## 2019-02-22 NOTE — ASSESSMENT & PLAN NOTE
Chronic, stable, well-controlled, taking medication as directed.     ROS is NEGATIVE for blurred vision, polydipsia, polyuria, diaphoresis, palpitations, fatigue, irritability, flank pain, BLE paresthesias.

## 2019-02-22 NOTE — PROGRESS NOTES
Subjective:   Chief Complaint/History of Present Illness:  Av Bob is a 73 y.o. 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    (HCC) Hyperlipidemia associated with type 2 diabetes mellitus  Patient and I discussed recent labs (see below; mixed dyslipidemia with low HDL, mildly uncontrolled).    Patient and I then discussed necessary dietary changes to make to address dyslipidemia.  Patient is currently taking cholesterol lowering medication: Atorvastatin.  Patient verbalized understanding.    ROS is NEGATIVE for dizziness, generalized weakness/fatigue, vision/hearing changes, jaw pain/paresthesias, BUE pain/paresthesias/numbness/weakness, chest pain/pressure, palpitations, dyspnea, RUQ abdominal pain, oliguria/anuria, BLE edema.    Lab Results   Component Value Date/Time    CHOLSTRLTOT 151 02/14/2019 11:05 AM    LDL 92 02/14/2019 11:05 AM    HDL 32 (A) 02/14/2019 11:05 AM    TRIGLYCERIDE 136 02/14/2019 11:05 AM       Urinary incontinence without sensory awareness  New problem for my medical evaluation, uncontrolled, patient states that he has chronic urinary incontinence due to decreased sensation in his genitalia system, particularly his penis and bladder.       Therefore, patient does not sense any signs or symptoms of UTI.  ROS is NEGATIVE for fevers, chills, rigors, flank pain, dysuria, hematuria, pyuria, polyuria, increased frequency of urination, diarrhea, constipation.    Patient does not self catheterize, and does not go to the restroom a regular basis.  Rather, patient depends on urinating into his adult diapers.  Patient drinks less than 64 ounces of fluids per day.  I suggested to patient that he try to urinate on a regular basis.  In this manner, patient could decrease his risk of infection by preventing bacterial load accumulation in his diaper, as well as regularly clearing his urethral tract.    (HCC) CKD (chronic kidney disease) stage 3, GFR 30-59 ml/min  Chronic, stable, well-controlled based on labs, as below.    Component      Latest Ref Rng & Units 6/6/2018 8/15/2018 9/12/2018 9/21/2018   Sodium      135 - 145 mmol/L 135 139 136 136   Potassium      3.6 - 5.5 mmol/L 3.8 3.8 4.3 4.9   Chloride      96 - 112 mmol/L 100 105 102 104   Co2      20 - 33 mmol/L 22 24 25 28   Anion Gap      0.0 - 11.9 13.0 (H) 10.0 9.0 4.0   Glucose      65 - 99 mg/dL 179 (H) 134 (H) 86 107 (H)   Bun      8 - 22 mg/dL 37 (H) 31 (H) 50 (H) 37 (H)   Creatinine      0.50 - 1.40 mg/dL 1.96 (H) 1.98 (H) 2.56 (H) 1.97 (H)   Calcium      8.5 - 10.5 mg/dL 9.1 9.4 9.7 9.7   AST(SGOT)      12 - 45 U/L       ALT(SGPT)      2 - 50 U/L       Alkaline Phosphatase      30 - 99 U/L       Total Bilirubin      0.1 - 1.5 mg/dL       Albumin      3.2 - 4.9 g/dL       Total Protein      6.0 - 8.2 g/dL       Globulin      1.9 - 3.5 g/dL       A-G Ratio      g/dL       GFR If African American      >60 mL/min/1.73 m 2 41 (A) 40 (A) 30 (A) 41 (A)   GFR If Non African American      >60 mL/min/1.73 m 2 34 (A) 33 (A) 25 (A) 34 (A)     Component      Latest Ref Rng & Units 11/19/2018 11/20/2018 11/21/2018 1/24/2019   Sodium      135 - 145 mmol/L 137 137 138 137   Potassium      3.6 - 5.5 mmol/L 4.2 4.2  3.8 4.4   Chloride      96 - 112 mmol/L 102 103 107 102   Co2      20 - 33 mmol/L 22 27 23 27   Anion Gap      0.0 - 11.9 13.0 (H) 7.0 8.0 8.0   Glucose      65 - 99 mg/dL 184 (H) 161 (H) 142 (H) 125 (H)   Bun      8 - 22 mg/dL 40 (H) 41 (H) 42 (H) 32 (H)   Creatinine      0.50 - 1.40 mg/dL 1.99 (H) 2.19 (H) 2.04 (H) 2.00 (H)   Calcium      8.5 - 10.5 mg/dL 9.8 9.6 8.6 10.2   AST(SGOT)      12 - 45 U/L  18     ALT(SGPT)      2 - 50 U/L  20     Alkaline Phosphatase      30 - 99 U/L  53     Total Bilirubin      0.1 - 1.5 mg/dL  0.7     Albumin      3.2 - 4.9 g/dL  3.8     Total Protein      6.0 - 8.2 g/dL  6.0     Globulin      1.9 - 3.5 g/dL  2.2     A-G Ratio      g/dL  1.7     GFR If African American      >60 mL/min/1.73 m 2 40 (A) 36 (A) 39 (A) 40 (A)   GFR If Non African American      >60 mL/min/1.73 m 2 33 (A) 30 (A) 32 (A) 33 (A)       Recurrent UTI  Chronic, uncontrolled, see notes from same day service 2/19/2019 re: urinary incontinence without sensory awareness    (HCC) Hypertension associated with diabetes  Chronic, stable, well-controlled, taking medication as directed.     ROS is NEGATIVE for dizziness, generalized weakness/fatigue, cold sweats,  vision/hearing changes, jaw pain/paresthesias, BUE pain/paresthesias/numbness/weakness, chest pain/pressure, palpitations, dyspnea, nausea, RUQ abdominal pain, oliguria/anuria, BLE edema.      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     ROS is NEGATIVE for blurred vision, polydipsia, polyuria, diaphoresis, palpitations, fatigue, irritability, flank pain, BLE paresthesias.     (HCC) Diabetic nephropathy associated with type 2 diabetes mellitus  Chronic, stable, well-controlled.  Continue care with nephrologist.    Chronic ulcer of left lower extremity with fat layer exposed  (HCC)  Established problem, uncontrolled, patient to continue following up with.  I did provide local wound care for both lesions on his right lower extremity and left lower extremity with chlorhexidine swabs, topical antibiotics, and nonadherent bandages.    Chronic ulcer of right lower extremity with fat layer exposed (HCC)  Established problem, uncontrolled, patient to continue following up with.  I did provide local wound care for both lesions on his right lower extremity and left lower extremity with chlorhexidine swabs, topical antibiotics, and nonadherent bandages.      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, without long-term current use of insulin 12/30/2016     Priority: Medium   • H/O Cerebrovascular accident (CVA) in adulthood 12/30/2016     Priority: Medium   • Coronary artery disease involving native coronary artery of native heart without angina pectoris 12/30/2016     Priority: Medium   • (HCC) Hyperlipidemia associated with type 2 diabetes mellitus 12/13/2011     Priority: Medium   • GERD with esophagitis 10/19/2018     Priority: Low   • Recurrent UTI 04/26/2018     Priority: Low   • (HCC) Left hemiparesis 12/27/2017     Priority: Low   • (HCC) Diabetic nephropathy associated with type 2 diabetes mellitus 11/30/2017     Priority: Low   • Psychophysiological insomnia 11/21/2017     Priority: Low   • (HCC) Moderate episode of recurrent major depressive disorder 11/07/2017     Priority: Low   • Wheelchair dependent 11/07/2017     Priority: Low   • Normocytic hypochromic anemia 05/20/2017     Priority: Low   • H/O non-Hodgkin's lymphoma 05/08/2017     Priority: Low   • (HCC) CKD (chronic kidney disease) stage 3,  GFR 30-59 ml/min 08/25/2016     Priority: Low   • Idiopathic chronic gout of multiple sites without tophus 01/28/2014     Priority: Low   • Urinary incontinence without sensory awareness 02/19/2019   • Chronic ulcer of left lower extremity with fat layer exposed ([facility]) 12/27/2018   • Chronic ulcer of right lower extremity with fat layer exposed ([facility]) 12/27/2018   • ([facility]) Tibial artery disease 12/27/2018       Additional History:   Allergies:    Diphenhydramine hcl; Lorazepam; and Ciprofloxacin     Current Medications:     Current Outpatient Prescriptions   Medication Sig Dispense Refill   • insulin lispro, Human, (HUMALOG KWIKPEN) 100 UNIT/ML Solution Pen-injector injection Inject 20 Units as instructed every day. 96 mL 6   • losartan (COZAAR) 25 MG Tab Take 1 Tab by mouth 2 Times a Day. 180 Tab 0   • Insulin Glargine (TOUJEO MAX SOLOSTAR) 300 UNIT/ML Solution Pen-injector Inject 45 Units as instructed every bedtime. 9 PEN 3   • Dapagliflozin Propanediol (FARXIGA) 5 MG Tab Take 1 tablet by mouth every day. 30 Tab 3   • MAGNESIUM-OXIDE 400 (241.3 Mg) MG Tab tablet TAKE 1 TABLET BY MOUTH THREE TIMES DAILY 270 Tab 0   • atorvastatin (LIPITOR) 40 MG Tab TAKE 1 TABLET BY MOUTH EVERY DAY 90 Tab 2   • TRULICITY 1.5 MG/0.5ML Solution Pen-injector INJECT 1.5 MG AS DIRECTED EVERY 7 DAYS 6 mL 0   • allopurinol (ZYLOPRIM) 300 MG Tab Take 150 mg by mouth every day.     • tamsulosin (FLOMAX) 0.4 MG capsule Take 0.4 mg by mouth every evening.     • acetaminophen (TYLENOL) 500 MG Tab Take 1,000 mg by mouth every 6 hours as needed for Mild Pain.     • fenofibrate (TRICOR) 145 MG Tab Take 1 Tab by mouth every day. 90 Tab 3   • clopidogrel (PLAVIX) 75 MG Tab Take 1 Tab by mouth every day. 90 Tab 2   • spironolactone (ALDACTONE) 50 MG Tab Take 1 Tab by mouth every day. 90 Tab 1   • fluoxetine (PROZAC) 40 MG capsule TAKE 1 CAPSULE BY MOUTH EVERY DAY 90 Cap 3   • metoprolol (TOPROL-XL) 200 MG XL tablet Take 1 Tab by mouth every evening.  "90 Tab 3   • ascorbic acid (VITAMIN C) 500 MG tablet Take 500 mg by mouth every day.     • Cholecalciferol (VITAMIN D) 2000 units Cap Take 1 Cap by mouth 2 Times a Day.     • aspirin 81 MG tablet Take 81 mg by mouth every day.     • CENTRUM SILVER PO Take 1 Tab by mouth every day.     • ONE TOUCH ULTRA TEST strip USE TO TEST UP TO FIVE TIMES DAILY 450 Strip 0     No current facility-administered medications for this visit.         Social History:     Social History   Substance Use Topics   • Smoking status: Never Smoker   • Smokeless tobacco: Never Used   • Alcohol use No       ROS:     - NOTE: All other systems reviewed and are negative, except as in HPI.     Objective:   Physical Exam:    Vitals: Blood pressure 110/80, pulse 62, temperature 36.4 °C (97.5 °F), height 1.829 m (6' 0.01\"), SpO2 99 %.   BMI: Body mass index is 27.12 kg/m².   General/Constitutional: Vitals as above, Well nourished, well developed male in no acute distress   Head/Eyes: Head is grossly normal & atraumatic, bilateral conjunctivae clear and not injected, bilateral EOMI, bilateral PERRL   ENT: Bilateral external ears grossly normal in appearance, Hearing grossly intact, External nares normal in appearance and without discharge/bleeding   Respiratory: No respiratory distress, bilateral lungs are clear to ausculation in all lung fields (anterior/lateral/posterior), no wheezing/rhonchi/rales   Cardiovascular: Regular rate and rhythm without murmur/gallops/rubs, distal pulses are intact and equal bilaterally (radial, posterior tibial), no bilateral lower extremity edema   MSK: mild atrophy of LUE and LLE, wheelchair dependent   Integumentary: Right shin and left medial ankle ulcers, with right shin approximately roughly nickel size, and left ankle  irregularly shaped (curvilinear, ~7cm), approximately no apparent rashes   Psych: Judgment grossly appropriate, no apparent depression/anxiety    Health Maintenance:     - Cologuard ordered    - " Diabetic foot exam will be performed at next visit    Imaging/Labs:     - 02/14/19 -- TG improved, HDL increased (32 from 25)    - 10/11/18 -- elevated LDL (bad cholesterol), and decreased HDL (good cholesterol)    Assessment and Plan:   1. Urinary incontinence without sensory awareness  2. Recurrent UTI  Chronic problem, uncontrolled, patient advised to urinate on a regular basis   - URINALYSIS,CULTURE IF INDICATED; Future    3. (HCC) Hyperlipidemia associated with type 2 diabetes mellitus  Chronic, uncontrolled, patient advised to pursue lifestyle changes, particularly cardiovascular exercise and increasing proportion of plant-based nutrition.    4. (HCC) CKD (chronic kidney disease) stage 3, GFR 30-59 ml/min  Chronic, stable, well-controlled.  Continue taking medication as directed.  Continue with nephrology    5. (HCC) Hypertension associated with diabetes  Chronic, stable, well-controlled.  Continue taking medication as directed.     6. 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  Continue taking medication as directed.  Patient to discuss Farxiga with Dr. Rasheed, given CKD 3.      7. (HCC) Diabetic nephropathy associated with type 2 diabetes mellitus  Chronic, stable, well-controlled.  Continue taking medication as directed.     8. Chronic ulcer of left lower extremity with fat layer exposed (HCC)  9. Chronic ulcer of right lower extremity with fat layer exposed (HCC)  Established problem, uncontrolled, continue care with wound care clinic.    10. Colon cancer screening   - COLOGUARD (FIT DNA)    11. Need for vaccination  Discussed, patient to consider        RTC: in 4months for General Checkup.    PLEASE NOTE: This dictation was created using voice recognition software. I have made every reasonable attempt to correct obvious errors, but I expect that  there are errors of grammar and possibly content that I did not discover before finalizing the note.

## 2019-02-25 ENCOUNTER — OFFICE VISIT (OUTPATIENT)
Dept: ENDOCRINOLOGY | Facility: MEDICAL CENTER | Age: 74
End: 2019-02-25
Payer: MEDICARE

## 2019-02-25 VITALS
WEIGHT: 202 LBS | BODY MASS INDEX: 27.36 KG/M2 | DIASTOLIC BLOOD PRESSURE: 62 MMHG | HEIGHT: 72 IN | OXYGEN SATURATION: 91 % | SYSTOLIC BLOOD PRESSURE: 108 MMHG | HEART RATE: 71 BPM

## 2019-02-25 DIAGNOSIS — E11.65 UNCONTROLLED TYPE 2 DIABETES MELLITUS WITH HYPERGLYCEMIA (HCC): ICD-10-CM

## 2019-02-25 DIAGNOSIS — I10 HYPERTENSION, UNSPECIFIED TYPE: ICD-10-CM

## 2019-02-25 DIAGNOSIS — E78.5 HYPERLIPIDEMIA, UNSPECIFIED HYPERLIPIDEMIA TYPE: ICD-10-CM

## 2019-02-25 LAB
HBA1C MFR BLD: 6.3 % (ref ?–5.8)
INT CON NEG: NORMAL
INT CON POS: NORMAL

## 2019-02-25 PROCEDURE — 99214 OFFICE O/P EST MOD 30 MIN: CPT | Performed by: INTERNAL MEDICINE

## 2019-02-25 PROCEDURE — 83036 HEMOGLOBIN GLYCOSYLATED A1C: CPT | Performed by: INTERNAL MEDICINE

## 2019-02-25 NOTE — LETTER
[employer] [facility]  Mitchell Pantoja M.D.  [address]  [place] [address]  Fax: [phone]   Authorization for Release/Disclosure of   Protected Health Information   Name: AV MADRIGAL : 1945 SSN: [id number]   Address: [address]  [place] [address] Phone:    [phone] (home)    I authorize the entity listed below to release/disclose the PHI below to:   [facility]/Mitchell Pantoja M.D. and Fran Rasheed M.D.   Provider or Entity Name:  Iron Cox M.D.   Address   [facility], [facility], [facility] [[email or web address]]   Phone:      Fax:  [phone]   Reason for request: continuity of care   Information to be released:    [  ] LAST COLONOSCOPY,  including any PATH REPORT and follow-up  [  ] LAST FIT/COLOGUARD RESULT [  ] LAST DEXA  [  ] LAST MAMMOGRAM  [  ] LAST PAP  [  ] LAST LABS [ xxx ] RETINA EXAM REPORT  [  ] IMMUNIZATION RECORDS  [  ] Release all info      [  ] Check here and initial the line next to each item to release ALL health information INCLUDING  _____ Care and treatment for drug and / or alcohol abuse  _____ HIV testing, infection status, or AIDS  _____ Genetic Testing    DATES OF SERVICE OR TIME PERIOD TO BE DISCLOSED: _____________  I understand and acknowledge that:  * This Authorization may be revoked at any time by you in writing, except if your health information has already been used or disclosed.  * Your health information that will be used or disclosed as a result of you signing this authorization could be re-disclosed by the recipient. If this occurs, your re-disclosed health information may no longer be protected by State or Federal laws.  * You may refuse to sign this Authorization. Your refusal will not affect your ability to obtain treatment.  * This Authorization becomes effective upon signing and will  on (date) __________.      If no date is indicated, this Authorization will  one (1) year from the signature date.    Name: Av Allen  [facility]    Signature:   Date:     2/25/2019       PLEASE FAX REQUESTED RECORDS BACK TO: [phone]

## 2019-02-25 NOTE — PROGRESS NOTES
Endocrinology Clinic Progress Note  PCP: Mitchell Pantoja M.D.    HPI:  Av Bob is a 73 y.o. old patient who comes in today for review of Management of Uncontrolled Type 2 Diabetes  Hypertension: currently on Losrtan 25 mg   Vitamin D deficiency: currently on 2000 iu of Vitamin D bid  Hyperlipidemia: currently on Lipitor 40 mg and Fenofibrate 145 mg per day    Most Recent HbA1c:   Lab Results   Component Value Date/Time    HBA1C 6.3 02/25/2019 05:35 PM    Previous A1c was 7 on 11/20/2018    Current Diabetes Regimen:  Humalog 5 unit base plus 1:50 above 150 correction factor with meals  Toujeo 40 units units at hs  Trulicity 1.5 mg weekly  Farxiga 5 mg per day    Testing blood sugars 3-4 times per day  Before Breakfast:   (this is between 9-1130 am)  Before Lunch:  Before Dinner: 115-145  Before Bedtime: 160-180  Other times:  Hypoglycemia:  None    ROS:  Constitutional: No weight loss  Cardiac: No palpitations or racing heart  Resp: No shortness of breath  Neuro: No numbness or tinging in feet  Endo: No heat or cold intolerance, no polyuria or polydipsia  All other systems were reviewed and were negative.    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, without long-term current use of insulin 12/30/2016     Priority: Medium   • H/O Cerebrovascular accident (CVA) in adulthood 12/30/2016     Priority: Medium   • Coronary artery disease involving native coronary artery of native heart without angina pectoris 12/30/2016     Priority: Medium   • (HCC) Hyperlipidemia associated with type 2 diabetes mellitus 12/13/2011     Priority: Medium   • GERD with esophagitis 10/19/2018     Priority: Low    • Recurrent UTI 04/26/2018     Priority: Low   • (HCC) Left hemiparesis 12/27/2017     Priority: Low   • (HCC) Diabetic nephropathy associated with type 2 diabetes mellitus 11/30/2017     Priority: Low   • Psychophysiological insomnia 11/21/2017     Priority: Low   • (HCC) Moderate episode of recurrent major depressive disorder 11/07/2017     Priority: Low   • Wheelchair dependent 11/07/2017     Priority: Low   • Normocytic hypochromic anemia 05/20/2017     Priority: Low   • H/O non-Hodgkin's lymphoma 05/08/2017     Priority: Low   • (HCC) CKD (chronic kidney disease) stage 3, GFR 30-59 ml/min 08/25/2016     Priority: Low   • Idiopathic chronic gout of multiple sites without tophus 01/28/2014     Priority: Low   • Urinary incontinence without sensory awareness 02/19/2019   • Chronic ulcer of left lower extremity with fat layer exposed ([facility]) 12/27/2018   • Chronic ulcer of right lower extremity with fat layer exposed ([facility]) 12/27/2018   • ([facility]) Tibial artery disease 12/27/2018       Past Surgical History:  Past Surgical History:   Procedure Laterality Date   • CYSTOSCOPY STENT PLACEMENT Left 2/12/2018    Procedure: CYSTOSCOPY  ;  Surgeon: Rey Barry M.D.;  Location: SURGERY [facility];  Service: Urology   • URETEROSCOPY Left 2/12/2018    Procedure: URETEROSCOPY- FLEXIBLE  ;  Surgeon: Rey Barry M.D.;  Location: SURGERY [facility];  Service: Urology   • LASERTRIPSY Left 2/12/2018    Procedure: LASERTRIPSY - LITHO  ;  Surgeon: Rey Barry M.D.;  Location: SURGERY [facility];  Service: Urology   • WOUND CLOSURE GENERAL  4/3/2012    Performed by GERHARD GILBERT at SURGERY SAME DAY [facility] ORS   • CARDIAC CATH  2009    Stents to LAD, Om   • DAGOBERTO BY LAPAROSCOPY  1998   • ANGIOPLASTY  1997    RCA followed by other stents as noted above.    • CARDIAC CATH  1997    stent RCA   • CATARACT EXTRACTION WITH IOL      bilateral   • LITHOTRIPSY     • TONSILLECTOMY AND ADENOIDECTOMY          Allergies:  Diphenhydramine hcl; Lorazepam; and Ciprofloxacin    Social History:  Social History     Social History   • Marital status:      Spouse name: N/A   • Number of children: N/A   • Years of education: N/A     Occupational History   • Not on file.     Social History Main Topics   • Smoking status: Never Smoker   • Smokeless tobacco: Never Used   • Alcohol use No   • Drug use: No   • Sexual activity: Not Currently     Partners: Female     Other Topics Concern   • Not on file     Social History Narrative   • No narrative on file       Family History:  Family History   Problem Relation Age of Onset   • Heart Disease Father         CAD   • Diabetes Father    • Cancer Mother    • Psychiatry Mother         Depression   • Depression Mother    • Kidney stones Brother    • Heart Disease Brother    • Psychiatry Brother         Depression   • Depression Brother    • Suicide Attempts Other    • Psychiatry Other         autism       Medications:    Current Outpatient Prescriptions:   •  DAILY MULTIPLE VITAMINS PO, Take  by mouth., Disp: , Rfl:   •  losartan (COZAAR) 25 MG Tab, Take 1 Tab by mouth 2 Times a Day., Disp: 180 Tab, Rfl: 0  •  Dapagliflozin Propanediol (FARXIGA) 5 MG Tab, Take 1 tablet by mouth every day., Disp: 30 Tab, Rfl: 3  •  MAGNESIUM-OXIDE 400 (241.3 Mg) MG Tab tablet, TAKE 1 TABLET BY MOUTH THREE TIMES DAILY, Disp: 270 Tab, Rfl: 0  •  atorvastatin (LIPITOR) 40 MG Tab, TAKE 1 TABLET BY MOUTH EVERY DAY, Disp: 90 Tab, Rfl: 2  •  allopurinol (ZYLOPRIM) 300 MG Tab, Take 150 mg by mouth every day., Disp: , Rfl:   •  fenofibrate (TRICOR) 145 MG Tab, Take 1 Tab by mouth every day., Disp: 90 Tab, Rfl: 3  •  clopidogrel (PLAVIX) 75 MG Tab, Take 1 Tab by mouth every day., Disp: 90 Tab, Rfl: 2  •  spironolactone (ALDACTONE) 50 MG Tab, Take 1 Tab by mouth every day., Disp: 90 Tab, Rfl: 1  •  fluoxetine (PROZAC) 40 MG capsule, TAKE 1 CAPSULE BY MOUTH EVERY DAY, Disp: 90 Cap, Rfl: 3  •  metoprolol  (TOPROL-XL) 200 MG XL tablet, Take 1 Tab by mouth every evening., Disp: 90 Tab, Rfl: 3  •  ascorbic acid (VITAMIN C) 500 MG tablet, Take 500 mg by mouth every day., Disp: , Rfl:   •  Cholecalciferol (VITAMIN D) 2000 units Cap, Take 1 Cap by mouth 2 Times a Day., Disp: , Rfl:   •  aspirin 81 MG tablet, Take 81 mg by mouth every day., Disp: , Rfl:   •  CENTRUM SILVER PO, Take 1 Tab by mouth every day., Disp: , Rfl:   •  ONE TOUCH ULTRA TEST strip, USE TO TEST UP TO 5 TIMES A DAY., Disp: 450 Strip, Rfl: 2  •  ONE TOUCH ULTRA TEST strip, USE TO TEST UP TO FIVE TIMES DAILY, Disp: 450 Strip, Rfl: 0  •  insulin lispro, Human, (HUMALOG KWIKPEN) 100 UNIT/ML Solution Pen-injector injection, Inject 20 Units as instructed every day., Disp: 96 mL, Rfl: 6  •  Insulin Glargine (TOUJEO MAX SOLOSTAR) 300 UNIT/ML Solution Pen-injector, Inject 45 Units as instructed every bedtime., Disp: 9 PEN, Rfl: 3  •  TRULICITY 1.5 MG/0.5ML Solution Pen-injector, INJECT 1.5 MG AS DIRECTED EVERY 7 DAYS, Disp: 6 mL, Rfl: 0  •  tamsulosin (FLOMAX) 0.4 MG capsule, Take 0.4 mg by mouth every evening., Disp: , Rfl:   •  acetaminophen (TYLENOL) 500 MG Tab, Take 1,000 mg by mouth every 6 hours as needed for Mild Pain., Disp: , Rfl:     Labs: Reviewed    Physical Examination:  Vital signs: /62   Pulse 71   Ht 1.829 m (6')   Wt 91.6 kg (202 lb)   SpO2 91%   BMI 27.40 kg/m²  Body mass index is 27.4 kg/m².  General: No apparent distress, cooperative  Eyes: No scleral icterus or discharge  ENMT: Normal on external inspection of nose, lips, normal thyroid exam  Neck: No abnormal masses on inspection  Resp: Normal effort, clear to auscultation bilaterally   CVS: Regular rate and rhythm, S1 S2 normal, no murmur   Extremities: No edema  Abdomen: abdominal obesity present  Neuro: Alert and oriented  Skin: No rash  Psych: Normal mood and affect, intact memory and able to make informed decisions  Foot Exam:  Monofilament: done  Monofilament testing with a  10 gram force: sensation intact: decreased bilaterally  Visual Inspection: Feet with maceration, ulcers, fissures.  Pedal pulses: decreased bilaterally  Has open wound on left medial ankle, going to wound care therapy one time a month.  Pictures in media   The following topics were discussed    1.  All medications, side effects and compliance (discussed carefully)  2.  Annual eye examinations at [facility] in January.   Will send request for records.   3.  Diabetic diet discussed in detail   4.  Foot care discussed, when to check feet, what to look for and when to contact HCP:   5.  Glycohemoglobin and other lab monitoring     6.  Home glucose monitoring emphasized  7.  Weight control and daily exercise        Assessment and Plan:    1. Uncontrolled type 2 diabetes mellitus with hyperglycemia (HCC)  Continue current regimen    2. Hypertension, unspecified type  Controlled    3. Hyperlipidemia, unspecified hyperlipidemia type  Continue statin therapy    Return in about 6 weeks (around 4/8/2019).    Thank you for allowing me to participate in the care of this patient.    Fran Rasheed M.D.  02/25/19    CC:   Mitchell Pantoja M.D.    This note was created using voice recognition software (Dragon). The accuracy of the dictation is limited by the abilities of the software. I have reviewed the note prior to signing, however some errors in grammar and context are still possible. If you have any questions related to this note please do not hesitate to contact our office.   This note was scribed by Blanca Rojas RN, CDE

## 2019-03-04 DIAGNOSIS — I10 ESSENTIAL HYPERTENSION: ICD-10-CM

## 2019-03-05 RX ORDER — METOPROLOL SUCCINATE 200 MG/1
200 TABLET, EXTENDED RELEASE ORAL EVERY EVENING
Qty: 90 TAB | Refills: 3 | Status: SHIPPED | OUTPATIENT
Start: 2019-03-05 | End: 2020-05-19

## 2019-03-21 ENCOUNTER — NON-PROVIDER VISIT (OUTPATIENT)
Dept: WOUND CARE | Facility: MEDICAL CENTER | Age: 74
End: 2019-03-21
Attending: INTERNAL MEDICINE
Payer: MEDICARE

## 2019-03-21 PROCEDURE — 97602 WOUND(S) CARE NON-SELECTIVE: CPT

## 2019-03-21 NOTE — PATIENT INSTRUCTIONS
Should you experience any significant changes in your wound(s) such as infection (redness, swelling, localized heat, increased pain, fever >101 F, chills) or have any questions regarding your home care instructions, please contact the wound center [phone]. If after hours, contact your primary care physician or go the hospital emergency room.  Keep dressing clean and dry and cover while bathing. Only change dressing if over saturated, soiled or its falling off.

## 2019-03-25 ENCOUNTER — HOSPITAL ENCOUNTER (OUTPATIENT)
Dept: LAB | Facility: MEDICAL CENTER | Age: 74
End: 2019-03-25
Attending: INTERNAL MEDICINE
Payer: MEDICARE

## 2019-03-25 DIAGNOSIS — E11.65 UNCONTROLLED TYPE 2 DIABETES MELLITUS WITH HYPERGLYCEMIA (HCC): ICD-10-CM

## 2019-03-25 LAB
CREAT UR-MCNC: 65.8 MG/DL
MICROALBUMIN UR-MCNC: 7.8 MG/DL
MICROALBUMIN/CREAT UR: 119 MG/G (ref 0–30)

## 2019-03-25 PROCEDURE — 82043 UR ALBUMIN QUANTITATIVE: CPT

## 2019-03-25 PROCEDURE — 82570 ASSAY OF URINE CREATININE: CPT

## 2019-03-26 ENCOUNTER — HOSPITAL ENCOUNTER (OUTPATIENT)
Dept: LAB | Facility: MEDICAL CENTER | Age: 74
End: 2019-03-26
Attending: INTERNAL MEDICINE
Payer: MEDICARE

## 2019-03-26 DIAGNOSIS — E11.65 UNCONTROLLED TYPE 2 DIABETES MELLITUS WITH HYPERGLYCEMIA (HCC): ICD-10-CM

## 2019-03-26 LAB
ANION GAP SERPL CALC-SCNC: 8 MMOL/L (ref 0–11.9)
BUN SERPL-MCNC: 39 MG/DL (ref 8–22)
CALCIUM SERPL-MCNC: 9.8 MG/DL (ref 8.5–10.5)
CHLORIDE SERPL-SCNC: 105 MMOL/L (ref 96–112)
CO2 SERPL-SCNC: 27 MMOL/L (ref 20–33)
CREAT SERPL-MCNC: 2.37 MG/DL (ref 0.5–1.4)
FASTING STATUS PATIENT QL REPORTED: NORMAL
GLUCOSE SERPL-MCNC: 93 MG/DL (ref 65–99)
POTASSIUM SERPL-SCNC: 4.2 MMOL/L (ref 3.6–5.5)
SODIUM SERPL-SCNC: 140 MMOL/L (ref 135–145)

## 2019-03-26 PROCEDURE — 80048 BASIC METABOLIC PNL TOTAL CA: CPT

## 2019-03-26 PROCEDURE — 36415 COLL VENOUS BLD VENIPUNCTURE: CPT

## 2019-03-28 DIAGNOSIS — I10 ESSENTIAL HYPERTENSION: ICD-10-CM

## 2019-03-28 RX ORDER — SPIRONOLACTONE 50 MG/1
50 TABLET, FILM COATED ORAL DAILY
Qty: 90 TAB | Refills: 2 | Status: SHIPPED | OUTPATIENT
Start: 2019-03-28 | End: 2019-04-16

## 2019-03-28 NOTE — PROGRESS NOTES
Endocrinology Clinic Progress Note  PCP: Mitchell Pantoja M.D.    HPI:  Av Bob is a 73 y.o. old patient who comes in today for review of multiple endocrine problems.   Vitamin d deficiency, currently on 2000 iu of vitamin d per day  Hypertension, controlled with Losartan 25 mg per day, Spironolactone 50 mg perday, and Metoprolol 200 mg  Hyperlipidemia controlled with Atorvastatin 40 mg per day  Type 2 diabetes, uncontrolled, with hyperglycemia      Most Recent HbA1c:   Lab Results   Component Value Date/Time    HBA1C 6.3 02/25/2019 05:35 PM        Current Diabetes Regimen:  Humalog insulin taking 0-5 units 3 per day (if blood sugar is less than 100 he doesn't take any, 100-150 =5 units, and then 2:50 over 150  Toujeo taking 40 units at hs  Farxiga 5 mg per day(wife report some yeast infection and or ? Diaper rash)  Trulicity 1.5 mg per week    Testing blood sugars 4 per day  Before Breakfast:  (fasting can be between 9 am and noon)  Before Lunch: 100-160 most of the time.   Before Dinner: 130-180 most of the time.   Before Bedtime: 150-160 range most of the time.     Hypoglycemia:  None    ROS:  Constitutional: No weight loss  Cardiac: No palpitations or racing heart  Resp: No shortness of breath  Neuro: No numbness or tinging in feet  Endo: No heat or cold intolerance, no polyuria or polydipsia  All other systems were reviewed and were negative.    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, without long-term current use of insulin 12/30/2016     Priority: Medium   • H/O Cerebrovascular accident (CVA) in adulthood 12/30/2016     Priority: Medium   • Coronary artery disease involving  native coronary artery of native heart without angina pectoris 12/30/2016     Priority: Medium   • (HCC) Hyperlipidemia associated with type 2 diabetes mellitus 12/13/2011     Priority: Medium   • GERD with esophagitis 10/19/2018     Priority: Low   • Recurrent UTI 04/26/2018     Priority: Low   • (HCC) Left hemiparesis 12/27/2017     Priority: Low   • (HCC) Diabetic nephropathy associated with type 2 diabetes mellitus 11/30/2017     Priority: Low   • Psychophysiological insomnia 11/21/2017     Priority: Low   • (HCC) Moderate episode of recurrent major depressive disorder 11/07/2017     Priority: Low   • Wheelchair dependent 11/07/2017     Priority: Low   • Normocytic hypochromic anemia 05/20/2017     Priority: Low   • H/O non-Hodgkin's lymphoma 05/08/2017     Priority: Low   • (HCC) CKD (chronic kidney disease) stage 3, GFR 30-59 ml/min 08/25/2016     Priority: Low   • Idiopathic chronic gout of multiple sites without tophus 01/28/2014     Priority: Low   • Urinary incontinence without sensory awareness 02/19/2019   • Chronic ulcer of left lower extremity with fat layer exposed (HCC) 12/27/2018   • Chronic ulcer of right lower extremity with fat layer exposed (HCC) 12/27/2018   • (HCC) Tibial artery disease 12/27/2018       Past Surgical History:  Past Surgical History:   Procedure Laterality Date   • CYSTOSCOPY STENT PLACEMENT Left 2/12/2018    Procedure: CYSTOSCOPY  ;  Surgeon: Rey Barry M.D.;  Location: [facility];  Service: Urology   • URETEROSCOPY Left 2/12/2018    Procedure: URETEROSCOPY- FLEXIBLE  ;  Surgeon: Rey Barry M.D.;  Location: SURGERY [facility];  Service: Urology   • LASERTRIPSY Left 2/12/2018    Procedure: LASERTRIPSY - LITHO  ;  Surgeon: Rey Barry M.D.;  Location: SURGERY [facility];  Service: Urology   • WOUND CLOSURE GENERAL  4/3/2012    Performed by GERHARD GILBERT at SURGERY SAME DAY [facility] ORS   • [facility] CARDIAC CATH  2009    Stents to LAD, Om   • DAGOBERTO  BY LAPAROSCOPY  1998   • ANGIOPLASTY  1997    RCA followed by other stents as noted above.    • [facility] CARDIAC CATH  1997    stent RCA   • CATARACT EXTRACTION WITH IOL      bilateral   • LITHOTRIPSY     • TONSILLECTOMY AND ADENOIDECTOMY         Allergies:  Diphenhydramine hcl; Lorazepam; and Ciprofloxacin    Social History:  Social History     Social History   • Marital status:      Spouse name: N/A   • Number of children: N/A   • Years of education: N/A     Occupational History   • Not on file.     Social History Main Topics   • Smoking status: Never Smoker   • Smokeless tobacco: Never Used   • Alcohol use No   • Drug use: No   • Sexual activity: Not Currently     Partners: Female     Other Topics Concern   • Not on file     Social History Narrative   • No narrative on file       Family History:  Family History   Problem Relation Age of Onset   • Heart Disease Father         CAD   • Diabetes Father    • Cancer Mother    • Psychiatry Mother         Depression   • Depression Mother    • Kidney stones Brother    • Heart Disease Brother    • Psychiatry Brother         Depression   • Depression Brother    • Suicide Attempts Other    • Psychiatry Other         autism       Medications:    Current Outpatient Prescriptions:   •  fluconazole (DIFLUCAN) 150 MG tablet, 1 tablet now and repeat another one in 3 days., Disp: 2 Tab, Rfl: 1  •  spironolactone (ALDACTONE) 50 MG Tab, Take 1 Tab by mouth every day., Disp: 90 Tab, Rfl: 2  •  metoprolol (TOPROL-XL) 200 MG XL tablet, Take 1 Tab by mouth every evening., Disp: 90 Tab, Rfl: 3  •  DAILY MULTIPLE VITAMINS PO, Take  by mouth., Disp: , Rfl:   •  insulin lispro, Human, (HUMALOG KWIKPEN) 100 UNIT/ML Solution Pen-injector injection, Inject 20 Units as instructed every day. (Patient taking differently: Inject 0-5 Units as instructed every day.), Disp: 96 mL, Rfl: 6  •  losartan (COZAAR) 25 MG Tab, Take 1 Tab by mouth 2 Times a Day., Disp: 180 Tab, Rfl: 0  •  Insulin Glargine  (MELVINFLORAJEO MAX SOLOSTAR) 300 UNIT/ML Solution Pen-injector, Inject 45 Units as instructed every bedtime. (Patient taking differently: Inject 40 Units as instructed every bedtime.), Disp: 9 PEN, Rfl: 3  •  MAGNESIUM-OXIDE 400 (241.3 Mg) MG Tab tablet, TAKE 1 TABLET BY MOUTH THREE TIMES DAILY, Disp: 270 Tab, Rfl: 0  •  atorvastatin (LIPITOR) 40 MG Tab, TAKE 1 TABLET BY MOUTH EVERY DAY, Disp: 90 Tab, Rfl: 2  •  TRULICITY 1.5 MG/0.5ML Solution Pen-injector, INJECT 1.5 MG AS DIRECTED EVERY 7 DAYS, Disp: 6 mL, Rfl: 0  •  fenofibrate (TRICOR) 145 MG Tab, Take 1 Tab by mouth every day., Disp: 90 Tab, Rfl: 3  •  clopidogrel (PLAVIX) 75 MG Tab, Take 1 Tab by mouth every day., Disp: 90 Tab, Rfl: 2  •  fluoxetine (PROZAC) 40 MG capsule, TAKE 1 CAPSULE BY MOUTH EVERY DAY, Disp: 90 Cap, Rfl: 3  •  ascorbic acid (VITAMIN C) 500 MG tablet, Take 500 mg by mouth every day., Disp: , Rfl:   •  Cholecalciferol (VITAMIN D) 2000 units Cap, Take 1 Cap by mouth 2 Times a Day., Disp: , Rfl:   •  aspirin 81 MG tablet, Take 81 mg by mouth every day., Disp: , Rfl:   •  ONE TOUCH ULTRA TEST strip, USE TO TEST UP TO 5 TIMES A DAY., Disp: 450 Strip, Rfl: 2  •  ONE TOUCH ULTRA TEST strip, USE TO TEST UP TO FIVE TIMES DAILY, Disp: 450 Strip, Rfl: 0  •  allopurinol (ZYLOPRIM) 300 MG Tab, Take 150 mg by mouth every day., Disp: , Rfl:   •  tamsulosin (FLOMAX) 0.4 MG capsule, Take 0.4 mg by mouth every evening., Disp: , Rfl:   •  acetaminophen (TYLENOL) 500 MG Tab, Take 1,000 mg by mouth every 6 hours as needed for Mild Pain., Disp: , Rfl:   •  CENTRUM SILVER PO, Take 1 Tab by mouth every day., Disp: , Rfl:     Labs: Reviewed    Physical Examination:  Vital signs: BP (!) 92/60   Pulse 76   Ht 1.829 m (6')   SpO2 90%   BMI 27.40 kg/m²  Body mass index is 27.4 kg/m². wheel-chair bound.   General: No apparent distress, cooperative  Eyes: No scleral icterus or discharge  ENMT: Normal on external inspection of nose, lips, normal thyroid exam  Neck: No  abnormal masses on inspection  Resp: Normal effort, clear to auscultation bilaterally   CVS: Regular rate and rhythm, S1 S2 normal, no murmur   Extremities: No edema  Abdomen: abdominal obesity present  Neuro: Alert and oriented  Skin: No rash  Psych: Normal mood and affect, intact memory and able to make informed decisions    Assessment and Plan:    1. Uncontrolled type 2 diabetes mellitus with hyperglycemia (HCC)  Discontinue farxiga due to ? Yeast infection. Advised patient's wife to give him diflucan as advised and watch very closely; if he develops fever along with tenderness and redness in the perineal area and or scrotum and or penis area, then she should take him to ER urgently as it could be possible fornier's gangrene.   The following topics were discussed     The following was reviewed  - Discussed diabetic diet discussed in detail-plate method.  - Discussed testing of blood sugars  - Reviewed medications and advised how to take   - Discussed importance of immunizations  - Discussed importance of  yearly eye exams. Just had eye exam, retinal specialist.  DR Cox .   - Advised daily foot exams. Educated on signs of infection.   - Educated on need to stay well hydrated with water.  - Educated to call with any questions or problems.      2. (HCC) Hyperlipidemia associated with type 2 diabetes mellitus  Continue statins.     3. (HCC) Hypertension associated with diabetes  Controlled.     4. Vitamin D deficiency  Cont vit D     Return in about 2 months (around 6/1/2019).    Thank you for allowing me to participate in the care of this patient.    Fran Rasheed  03/28/19    CC:   Mitchell Pantoja M.D.    This note was created using voice recognition software (Dragon). The accuracy of the dictation is limited by the abilities of the software. I have reviewed the note prior to signing, however some errors in grammar and context are still possible. If you have any questions related to this note please  do not hesitate to contact our office.   This note was scribed by Blanca Rojas RN, CDE

## 2019-04-01 ENCOUNTER — OFFICE VISIT (OUTPATIENT)
Dept: ENDOCRINOLOGY | Facility: MEDICAL CENTER | Age: 74
End: 2019-04-01
Payer: MEDICARE

## 2019-04-01 VITALS
HEART RATE: 76 BPM | SYSTOLIC BLOOD PRESSURE: 92 MMHG | HEIGHT: 72 IN | BODY MASS INDEX: 27.4 KG/M2 | OXYGEN SATURATION: 90 % | DIASTOLIC BLOOD PRESSURE: 60 MMHG

## 2019-04-01 DIAGNOSIS — E78.5 HYPERLIPIDEMIA ASSOCIATED WITH TYPE 2 DIABETES MELLITUS (HCC): ICD-10-CM

## 2019-04-01 DIAGNOSIS — B37.2 YEAST INFECTION OF THE SKIN: ICD-10-CM

## 2019-04-01 DIAGNOSIS — E11.59 HYPERTENSION ASSOCIATED WITH DIABETES (HCC): ICD-10-CM

## 2019-04-01 DIAGNOSIS — E11.69 HYPERLIPIDEMIA ASSOCIATED WITH TYPE 2 DIABETES MELLITUS (HCC): ICD-10-CM

## 2019-04-01 DIAGNOSIS — I15.2 HYPERTENSION ASSOCIATED WITH DIABETES (HCC): ICD-10-CM

## 2019-04-01 DIAGNOSIS — E11.65 UNCONTROLLED TYPE 2 DIABETES MELLITUS WITH HYPERGLYCEMIA (HCC): ICD-10-CM

## 2019-04-01 DIAGNOSIS — E55.9 VITAMIN D DEFICIENCY: ICD-10-CM

## 2019-04-01 PROCEDURE — 99214 OFFICE O/P EST MOD 30 MIN: CPT | Performed by: INTERNAL MEDICINE

## 2019-04-01 RX ORDER — FLUCONAZOLE 150 MG/1
TABLET ORAL
Qty: 2 TAB | Refills: 1 | Status: SHIPPED | OUTPATIENT
Start: 2019-04-01 | End: 2019-04-16

## 2019-04-16 ENCOUNTER — NON-PROVIDER VISIT (OUTPATIENT)
Dept: WOUND CARE | Facility: MEDICAL CENTER | Age: 74
End: 2019-04-16
Attending: INTERNAL MEDICINE
Payer: MEDICARE

## 2019-04-16 ENCOUNTER — OFFICE VISIT (OUTPATIENT)
Dept: CARDIOLOGY | Facility: MEDICAL CENTER | Age: 74
End: 2019-04-16
Payer: MEDICARE

## 2019-04-16 VITALS
SYSTOLIC BLOOD PRESSURE: 110 MMHG | OXYGEN SATURATION: 97 % | DIASTOLIC BLOOD PRESSURE: 78 MMHG | HEIGHT: 72 IN | BODY MASS INDEX: 27.09 KG/M2 | WEIGHT: 200 LBS | HEART RATE: 70 BPM

## 2019-04-16 DIAGNOSIS — E11.69 HYPERLIPIDEMIA ASSOCIATED WITH TYPE 2 DIABETES MELLITUS (HCC): ICD-10-CM

## 2019-04-16 DIAGNOSIS — I15.2 HYPERTENSION ASSOCIATED WITH DIABETES (HCC): ICD-10-CM

## 2019-04-16 DIAGNOSIS — Z79.4 TYPE 2 DIABETES MELLITUS WITH HYPERGLYCEMIA, WITH LONG-TERM CURRENT USE OF INSULIN (HCC): ICD-10-CM

## 2019-04-16 DIAGNOSIS — E11.65 TYPE 2 DIABETES MELLITUS WITH HYPERGLYCEMIA, WITH LONG-TERM CURRENT USE OF INSULIN (HCC): ICD-10-CM

## 2019-04-16 DIAGNOSIS — I25.10 CORONARY ARTERY DISEASE INVOLVING NATIVE CORONARY ARTERY OF NATIVE HEART WITHOUT ANGINA PECTORIS: ICD-10-CM

## 2019-04-16 DIAGNOSIS — N18.30 CKD (CHRONIC KIDNEY DISEASE) STAGE 3, GFR 30-59 ML/MIN (HCC): ICD-10-CM

## 2019-04-16 DIAGNOSIS — E11.59 HYPERTENSION ASSOCIATED WITH DIABETES (HCC): ICD-10-CM

## 2019-04-16 DIAGNOSIS — I48.0 PAROXYSMAL ATRIAL FIBRILLATION (HCC): ICD-10-CM

## 2019-04-16 DIAGNOSIS — E78.5 HYPERLIPIDEMIA ASSOCIATED WITH TYPE 2 DIABETES MELLITUS (HCC): ICD-10-CM

## 2019-04-16 PROCEDURE — 97597 DBRDMT OPN WND 1ST 20 CM/<: CPT

## 2019-04-16 PROCEDURE — 99214 OFFICE O/P EST MOD 30 MIN: CPT | Performed by: INTERNAL MEDICINE

## 2019-04-16 NOTE — Clinical Note
Brayden Jarvis, just FYLORI, stopping his spironolactone given his worsening kidney function and occasional hypotension, hopefully this will help. Let me know if there's any issues with this. Thanks! -Osvaldo

## 2019-04-16 NOTE — PROGRESS NOTES
Cardiology Follow-up Consultation Note    Date of note:    4/16/2019  Primary Care Provider: Mitchell Pantoja M.D.  Referring Provider: Leonardo.     Patient Name: Av Bob   YOB: 1945  MRN:              [id number]    Chief Complaint: CAD    History of Present Illness: Av Bob is a 74 y.o. male whose current medical problems include CKD stage III, hypertension, CAD  (GIOVANNA to RCA in '97, GIOVANNA X2 to LAD and GIOVANNA X to OM in '09), atrial fibrillation, diabetes, dyslipidemia, gout, CVA 12/2016,  non-hodgkins lymphoma c/b brain lesions with resultant left hemiparesis s/p chemotherapy and depression who is here for follow-up.     At our visit, 4/3/2018:  In terms of his CVA, this was in 2016, and while he was on aspirin and plavix at [facility].  This was in the setting of active lymphoma.    He was also admitted to [facility] again in 5/2017 for sepsis and was noted to have atrial fibrillation at this time. He has no noted recurrence and no episodes before that.  He has never been on anticoagulation for Cva prevention.     In terms of his NHL, he sees Dr. Noel, and his last PET scan showed he was cancer free.     Right leg wounds, currently healing. Evaluated by Dr. Terry for bilateral tibial artery stenosis, and decided against surgery at this time.     At our visit, 10/9/2018:  Started xarelto and had episodes of melena.  Switched back to plavix.  FOBT was negative.     Interim Events:    Works with  an hour three times a week, no symptoms.    In terms of PSVT, no palpitations.     In terms of hypertension, well controlled. Sometimes 90s/60s. Asymptomatic.     In terms of Cad, no angina.     Admitted 11/21/2018 for weakness, negative cerebrovascular work-up.     In terms of cancer, no e/o recurrence.       Review of Systems   Constitution: Negative for chills, fever and night sweats.   HENT: Negative for nosebleeds.    Respiratory:  Negative for hemoptysis.    Gastrointestinal: Negative for hematemesis, hematochezia and melena.   Genitourinary: Negative for hematuria.         Past Medical History:   Diagnosis Date   • Acute respiratory failure with hypoxia ([facility]) 5/20/2017   • CAD (coronary artery disease)     GIOVANNA to RCA in '97, GIOVANNA X2 to LAD and GIOVANNA X2 to OM in '09   • Cancer ([facility])     2017; chemo lympoma   • Cataract    • Cerebrovascular accident (CVA) ([facility]) 12/30/2016    Left arm weakness  etiology of stroke not established, lymphoma discovered on MRI evaluation of stroke, L hemiparesis much worse after acute infectious illness in mid 2017, but no specific diagnosed recurrent neurological etiology, all at [facility]   • CKD (chronic kidney disease) stage 3, GFR 30-59 ml/min ([facility])    • Controlled gout 2014   • Coronary atherosclerosis of native coronary artery     S/P PTCA (percutaneous transluminal coronary angioplasty), RCA, 5/1997, patent on cath 7/10/2009 at the time of interventions on his left anterior descending and circumflex coronary arteries   • Depression    • Diabetes ([facility])    • Enterococcal septicemia ([facility]) 8/12/2017   • Hypertension    • Hypokalemia 2012    controlled with combination of ACE inhibitor or ARB plus spironolactone   • Hypomagnesemia 08/12/2017    etiology uncertain   • Lymphoma ([facility]) 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 Barry   • NSTEMI (non-ST elevated myocardial infarction) ([facility]) 07/18/2017    complicating UTI with sepsis   • Pain    • Polyneuropathy in diabetes(357.2) 9/11/2013   • Septic shock ([facility]) 5/20/2017   • Skin ulcer of calf ([facility]) 2015    Right, Dr. Terry and wound care   • Stroke ([facility]) 2016    left sided weakness   • Urinary bladder disorder    • Urinary incontinence    • Wound of left leg 2012    Requiring surgery and debridment, Dr. Moore         Past Surgical History:   Procedure  Laterality Date   • CYSTOSCOPY STENT PLACEMENT Left 2/12/2018    Procedure: CYSTOSCOPY  ;  Surgeon: Rey Barry M.D.;  Location: SURGERY [facility];  Service: Urology   • URETEROSCOPY Left 2/12/2018    Procedure: URETEROSCOPY- FLEXIBLE  ;  Surgeon: Rey Barry M.D.;  Location: SURGERY [facility];  Service: Urology   • LASERTRIPSY Left 2/12/2018    Procedure: LASERTRIPSY - LITHO  ;  Surgeon: Rey Barry M.D.;  Location: SURGERY [facility];  Service: Urology   • WOUND CLOSURE GENERAL  4/3/2012    Performed by GERHARD GILBERT at SURGERY SAME DAY [facility] ER ORS   • LETITIA CARDIAC CATH  2009    Stents to LAD, Om   • DAGOBERTO BY LAPAROSCOPY  1998   • ANGIOPLASTY  1997    RCA followed by other stents as noted above.    • ZJEFFREY CARDIAC CATH  1997    stent RCA   • CATARACT EXTRACTION WITH IOL      bilateral   • LITHOTRIPSY     • TONSILLECTOMY AND ADENOIDECTOMY           Current Outpatient Prescriptions   Medication Sig Dispense Refill   • spironolactone (ALDACTONE) 50 MG Tab Take 1 Tab by mouth every day. 90 Tab 2   • metoprolol (TOPROL-XL) 200 MG XL tablet Take 1 Tab by mouth every evening. 90 Tab 3   • DAILY MULTIPLE VITAMINS PO Take  by mouth.     • insulin lispro, Human, (HUMALOG KWIKPEN) 100 UNIT/ML Solution Pen-injector injection Inject 20 Units as instructed every day. (Patient taking differently: Inject 0-5 Units as instructed every day.) 96 mL 6   • losartan (COZAAR) 25 MG Tab Take 1 Tab by mouth 2 Times a Day. 180 Tab 0   • Insulin Glargine (TOUJEO MAX SOLOSTAR) 300 UNIT/ML Solution Pen-injector Inject 45 Units as instructed every bedtime. (Patient taking differently: Inject 40 Units as instructed every bedtime.) 9 PEN 3   • MAGNESIUM-OXIDE 400 (241.3 Mg) MG Tab tablet TAKE 1 TABLET BY MOUTH THREE TIMES DAILY 270 Tab 0   • atorvastatin (LIPITOR) 40 MG Tab TAKE 1 TABLET BY MOUTH EVERY DAY 90 Tab 2   • TRULICITY 1.5 MG/0.5ML Solution Pen-injector INJECT 1.5 MG AS DIRECTED EVERY 7 DAYS 6 mL 0   •  allopurinol (ZYLOPRIM) 300 MG Tab Take 150 mg by mouth every day.     • fenofibrate (TRICOR) 145 MG Tab Take 1 Tab by mouth every day. 90 Tab 3   • clopidogrel (PLAVIX) 75 MG Tab Take 1 Tab by mouth every day. 90 Tab 2   • fluoxetine (PROZAC) 40 MG capsule TAKE 1 CAPSULE BY MOUTH EVERY DAY 90 Cap 3   • ascorbic acid (VITAMIN C) 500 MG tablet Take 500 mg by mouth every day.     • Cholecalciferol (VITAMIN D) 2000 units Cap Take 1 Cap by mouth 2 Times a Day.     • aspirin 81 MG tablet Take 81 mg by mouth every day.     • fluconazole (DIFLUCAN) 150 MG tablet 1 tablet now and repeat another one in 3 days. (Patient not taking: Reported on 4/16/2019) 2 Tab 1   • ONE TOUCH ULTRA TEST strip USE TO TEST UP TO 5 TIMES A DAY. 450 Strip 2   • ONE TOUCH ULTRA TEST strip USE TO TEST UP TO FIVE TIMES DAILY 450 Strip 0   • tamsulosin (FLOMAX) 0.4 MG capsule Take 0.4 mg by mouth every evening.     • acetaminophen (TYLENOL) 500 MG Tab Take 1,000 mg by mouth every 6 hours as needed for Mild Pain.     • CENTRUM SILVER PO Take 1 Tab by mouth every day.       No current facility-administered medications for this visit.          Allergies   Allergen Reactions   • Diphenhydramine Hcl Anxiety     Pt is able to tolerate  Mg benadryl with less anxiety   • Lorazepam Unspecified     Disorientation   • Ciprofloxacin      Rash,stomach ache         Family History   Problem Relation Age of Onset   • Heart Disease Father         CAD   • Diabetes Father    • Cancer Mother    • Psychiatry Mother         Depression   • Depression Mother    • Kidney stones Brother    • Heart Disease Brother    • Psychiatry Brother         Depression   • Depression Brother    • Suicide Attempts Other    • Psychiatry Other         autism         Social History     Social History   • Marital status:      Spouse name: N/A   • Number of children: N/A   • Years of education: N/A     Occupational History   • Not on file.     Social History Main Topics   • Smoking status:  Never Smoker   • Smokeless tobacco: Never Used   • Alcohol use No   • Drug use: No   • Sexual activity: Not Currently     Partners: Female     Other Topics Concern   • Not on file     Social History Narrative   • No narrative on file         Physical Exam:  Ambulatory Vitals  /78 (BP Location: Right arm, Patient Position: Sitting, BP Cuff Size: Adult)   Pulse 70   Ht 1.829 m (6')   Wt 90.7 kg (200 lb)   SpO2 97%    Oxygen Therapy:  Pulse Oximetry: 97 %  BP Readings from Last 4 Encounters:   04/16/19 110/78   04/01/19 (!) 92/60   02/25/19 108/62   02/19/19 110/80       Weight/BMI: Body mass index is 27.12 kg/m².  Wt Readings from Last 4 Encounters:   04/16/19 90.7 kg (200 lb)   02/25/19 91.6 kg (202 lb)   01/31/19 90.7 kg (200 lb)   01/23/19 90.7 kg (200 lb)       General: No apparent distress  Eyes: nl conjunctiva  ENT: OP clear, normal external appearance of nose and ears  Neck: JVP <8 cm H2O, no carotid bruits  Lungs: normal respiratory effort, CTAB  Heart: RRR, no murmurs, no rubs or gallops, no edema bilateral lower extremities. No LV/RV heave on cardiac palpatation. 2+ bilateral radial pulses.    Abdomen: soft, non tender, non distended, no masses, normal bowel sounds.  No HSM.  Extremities/MSK: no clubbing, no cyanosis  Neurological: decreased sensation on the left, left extremity weakness.   Psychiatric: Appropriate affect, A/O x 3, intact judgement and insight  Skin: Warm extremities    Exam repeated in full and unchanged except for as noted above.      Lab Data Review:  Lab Results   Component Value Date/Time    CHOLSTRLTOT 151 02/14/2019 11:05 AM    LDL 92 02/14/2019 11:05 AM    HDL 32 (A) 02/14/2019 11:05 AM    TRIGLYCERIDE 136 02/14/2019 11:05 AM       Lab Results   Component Value Date/Time    SODIUM 140 03/26/2019 11:09 AM    POTASSIUM 4.2 03/26/2019 11:09 AM    CHLORIDE 105 03/26/2019 11:09 AM    CO2 27 03/26/2019 11:09 AM    GLUCOSE 93 03/26/2019 11:09 AM    BUN 39 (H) 03/26/2019 11:09 AM       CREATININE 2.37 (H) 03/26/2019 11:09 AM    CREATININE 1.4 05/28/2008 05:42 PM    BUNCREATRAT 10 03/27/2017 02:11 PM     Lab Results   Component Value Date/Time    ALKPHOSPHAT 53 11/20/2018 12:55 PM    ASTSGOT 18 11/20/2018 12:55 PM    ALTSGPT 20 11/20/2018 12:55 PM    TBILIRUBIN 0.7 11/20/2018 12:55 PM      Lab Results   Component Value Date/Time    WBC 10.0 11/20/2018 12:55 PM     No components found for: HBGA1C  No components found for: TROPONIN  No components found for: BNP      Cardiac Imaging and Procedures Review:    EKG dated 12/27/2017:  SINUS RHYTHM   RIGHT AXIS DEVIATION, POSSIBLY DUE TO ELECTRODE PLACEMENT AND POSITION   BORDERLINE T ABNORMALITIES, ANT-LAT LEADS, UNCHANGED   BORDERLINE PROLONGED QT INTERVAL   Compared to ECG 09/22/2017 13:03:34   NO SIGNIFICANT CHANGE ALLOWING FOR ELECTRODE PLACEMENT (LEFT ARM AND FOOT)     Echo dated 11/21/2018:  CONCLUSIONS  Prior echocardiogram 10/5/2017 - no noted changes.  The effusion is   new.  Left ventricular ejection fraction is visually estimated to be 60%.  Normal inferior vena cava size and inspiratory collapse.  Trivial to small pericardial effusion noted without evidence of   hemodynamic compromise.  No significant valve disease or flow abnormalities.     Holter, 5/30/2018:    CONCLUSIONS:  * paroxysmal supraventricular tachycardia.  15 episodes over 13 days.  Longest episode 17 seconds  * No atrial fibrillation  * No significant ectopy  * No arrhythmias or ectopy during symptoms      Nuclear Perfusion Imaging (1/2018):   Myocardial Perfusion   Report   NUCLEAR IMAGING INTERPRETATION   The LV is mildly dilated with global hypokinesis.  Calculated LV EF is 49%.     There is a small region of decreased perfusion in the inferior-lateral wall    with a mild amount of inducible ischemia.   ECG INTERPRETATION   Negative stress ECG for ischemia.      Stress test 6/2017 (tiffanie Williamson)    Findings:   Small in size moderate in intensity fixed apical defect. Computer  analysis also suggests mild in intensity small in size diminished counts along inferior wall of left ventricle which appears to improve at time of rest. Summed stress score is   7. Summed rest score is 1. Gross hypomotility of the left ventricle is noted with disordered contraction evident. Global hypokinesis is noted.      [facility] (2009): at [facility], last stent.     Radiology test Review:  CXR: 1/2018   FINDINGS:  The heart is normal in size.  No pulmonary infiltrates or consolidations are noted.  No pleural effusions are appreciated.    Vascular US 2/2018:  Vascular Laboratory   Conclusions   There is no evidence of arterial disease demonstrated (PADMINI is .9-1.0).    Absent flow within the Left VIVIENNE.     Vascular Laboratory   CONCLUSIONS   Right Lower Extremity-   Normal flow from the common femoral artery extending distally to the    popliteal artery. Flow within the posterior tibial artery is brisk and    multiphasic.   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     Left Lower Extremity-   Normal flow seen from the common femoral artery extending distally to the    popliteal artery.   Area of elevated velocities seen within the mid segment of the posterior    tibial artery. Consistent with >50% stenosis.   Occlusion of the anterior tibial artery at the mid-distal segment, minimal    flow reversal seen at the level of the ankle.      MRI 12/31/2016  7 mm acute/early subacute infarct is present in the right anterolateral medulla.  There is no significant mass effect or hemorrhage.  No other recent infarction.    12/31/2016:  Three sites of abnormal parenchymal enhancement are present.  *  At the previously seen right anterior pontine lesion, there is a 5 x 3 x 7 mm elongated enhancing peripheral pontine lesion.  *  There is a second 2 mm enhancing focus also in the right anterior david.  *  There is a third 3 mm lesion at the left  globus pallidus internus.    These findings may represent metastatic disease.  The elongated morphology of the anterior peripheral pontine lesion is somewhat atypical for metastatic lesion, and could potentially represent enhancement of a subacute infarct.  Two other lesions could also, in theory, represent subacute enhancing reperfusion of lacunar infarctions. Anecdotally, this would be an unusual finding in lacunar infarctions. Note that post infarct enhancement typically occurs approximately 1 week after infarction and resolves within 12 weeks or less.       Head CTA 2016:  1. There are plaque formations identified in both carotid bifurcations   (right greater than left). This causes approximately 50% stenosis on the   right and 10-20% stenosis on the left in the proximal internal carotid   arteries. The remainder of the carotid   vasculature is unremarkable.  2. The vertebral arteries are within normal limits.  3. No dissection.  4. A 5.5 mm left thyroid lobe cyst/nodule is present.  5. A 10 x 13 mm partially calcified pulmonary nodule is present in the   left upper lobe. There may be some internal fat density. Findings could   represent a benign pulmonary hamartoma. Recommend clinical correlation.  6. Moderate to advanced degenerative changes are present throughout the   cervical spine.      Medical Decision Makin. Atherosclerosis of native coronary artery of native heart without angina pectoris  S/p 5 stents, last in . Mild ischemia on two recent nuclear perfusion scans    Currently without recurrent chest pain (had some atypical chest pain 2017) so will treat medically.  -continue aspirin/lipitor      2. 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 Rasheed, now well controlled, last hgbA1c <7    3. Essential hypertension, benign  [place] controlled.   -stop  spironolactone 50mg PO daily.   -Continue losartan 25mg PO bid.     4. Paroxysmal atrial fibrillation (CMS-HCC)  Self limited and one time in setting of sepsis. Discussed at length risks of recurrent CVA given his previous likely embolic event, and if he would like an ILR to confirm diagnosis or to start empiric anticoagulation.  He prefers the later and we started xarelto 4/2018.  Unfortunately he had melena almost immediately, and was switched back to aspirin/plavix.  He does not want to retrial anticoagulation, or a GI referral as he sees enough physicians already.   -continue aspirin/plavix. No anticoag for now given patient preference despite risk of recurrent CVA.    -continue metoprolol XL 200mg PO Daily    5. CKD (chronic kidney disease) stage 3, GFR 30-59 ml/min  Worsening, now GFR <30.  Also with hypotensive episodes.   -stop spironolactone, will cc Dr. Jarvis.  -f/u BMP in 1-2 weeks, did have low potassium off spironolactone in the past.     6. Diffuse large cell non-Hodgkin's lymphoma (CMS-HCC)  Followed by Dr. Noel, in remission per report with recent negative PET scan.    7. Left hemiparesis (CMS-HCC)  Acute 12/2016 at [facility].  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  Symptoms originally improved significantly with chemotherapy and he was walking well, and then hemiparesis worsened significantly after sepsis from UTI and norovirus infection mid 2017.  Currently continuing physical therapy, but remains in a wheelchair.       8. Cerebrovascular accident (CVA) due to embolism of cerebral artery (CMS-HCC)  In addition to cerebral enhancing lesions thought to be lymphoma on MRI 12/2016, he was found to have a subacute infarct in the right anterolateral medulla.    -aspirin/plavix for CVA prevention    9. Dyslipidemia  Lipitor, fenofibrate    10. Peripheral vascular disease (CMS-HCC)  Followed by Dr. Terry, thought to not be  candidate for bialteral anterior tibial artery disease as the arteries reconstitute and his leg wounds are currently healing.  -continue aspirin/plavix     11. Goals of care - this is completed, will have them bring a copy for scanning. She can access online.         Return in about 6 months (around 10/16/2019).       Osvaldo Tucker MD  [facility]  [facility], [address]  [address] [address]  Phone: [phone]  Fax: [phone]

## 2019-04-17 RX ORDER — DULAGLUTIDE 1.5 MG/.5ML
INJECTION, SOLUTION SUBCUTANEOUS
Qty: 6 ML | Refills: 0 | Status: ON HOLD | OUTPATIENT
Start: 2019-04-17 | End: 2019-07-01

## 2019-04-17 NOTE — PATIENT INSTRUCTIONS
Should you experience any significant changes in your wound(s) such as infection (redness, swelling, localized heat, increased pain, fever >101 F, chills) or have any questions regarding your home care instructions, please contact the wound center [phone]. If after hours, contact your primary care physician or go the hospital emergency room.  Keep dressing clean and dry and cover while bathing. Only change dressing if over saturated, soiled or its falling off.

## 2019-04-28 DIAGNOSIS — I15.2 HYPERTENSION ASSOCIATED WITH DIABETES (HCC): ICD-10-CM

## 2019-04-28 DIAGNOSIS — E11.59 HYPERTENSION ASSOCIATED WITH DIABETES (HCC): ICD-10-CM

## 2019-04-29 RX ORDER — LOSARTAN POTASSIUM 25 MG/1
TABLET ORAL
Qty: 180 TAB | Refills: 1 | Status: ON HOLD | OUTPATIENT
Start: 2019-04-29 | End: 2019-07-01

## 2019-04-30 ENCOUNTER — HOSPITAL ENCOUNTER (OUTPATIENT)
Dept: LAB | Facility: MEDICAL CENTER | Age: 74
End: 2019-04-30
Attending: INTERNAL MEDICINE
Payer: MEDICARE

## 2019-04-30 ENCOUNTER — HOSPITAL ENCOUNTER (OUTPATIENT)
Dept: LAB | Facility: MEDICAL CENTER | Age: 74
End: 2019-04-30
Attending: FAMILY MEDICINE
Payer: MEDICARE

## 2019-04-30 DIAGNOSIS — N18.30 CKD (CHRONIC KIDNEY DISEASE) STAGE 3, GFR 30-59 ML/MIN (HCC): ICD-10-CM

## 2019-04-30 DIAGNOSIS — N39.0 RECURRENT UTI: ICD-10-CM

## 2019-04-30 LAB
ANION GAP SERPL CALC-SCNC: 6 MMOL/L (ref 0–11.9)
APPEARANCE UR: ABNORMAL
BACTERIA #/AREA URNS HPF: ABNORMAL /HPF
BILIRUB UR QL STRIP.AUTO: NEGATIVE
BUN SERPL-MCNC: 24 MG/DL (ref 8–22)
CALCIUM SERPL-MCNC: 8.7 MG/DL (ref 8.5–10.5)
CHLORIDE SERPL-SCNC: 106 MMOL/L (ref 96–112)
CO2 SERPL-SCNC: 27 MMOL/L (ref 20–33)
COLOR UR: YELLOW
CREAT SERPL-MCNC: 1.64 MG/DL (ref 0.5–1.4)
EPI CELLS #/AREA URNS HPF: NEGATIVE /HPF
GLUCOSE SERPL-MCNC: 124 MG/DL (ref 65–99)
GLUCOSE UR STRIP.AUTO-MCNC: 100 MG/DL
HYALINE CASTS #/AREA URNS LPF: ABNORMAL /LPF
KETONES UR STRIP.AUTO-MCNC: NEGATIVE MG/DL
LEUKOCYTE ESTERASE UR QL STRIP.AUTO: ABNORMAL
MAGNESIUM SERPL-MCNC: 1.5 MG/DL (ref 1.5–2.5)
MICRO URNS: ABNORMAL
NITRITE UR QL STRIP.AUTO: NEGATIVE
PH UR STRIP.AUTO: 7.5 [PH]
PHOSPHATE SERPL-MCNC: 2.6 MG/DL (ref 2.5–4.5)
POTASSIUM SERPL-SCNC: 3.7 MMOL/L (ref 3.6–5.5)
PROT UR QL STRIP: 30 MG/DL
RBC # URNS HPF: ABNORMAL /HPF
RBC UR QL AUTO: ABNORMAL
SODIUM SERPL-SCNC: 139 MMOL/L (ref 135–145)
SP GR UR STRIP.AUTO: 1.01
UROBILINOGEN UR STRIP.AUTO-MCNC: 0.2 MG/DL
WBC #/AREA URNS HPF: ABNORMAL /HPF

## 2019-04-30 PROCEDURE — 36415 COLL VENOUS BLD VENIPUNCTURE: CPT

## 2019-04-30 PROCEDURE — 87086 URINE CULTURE/COLONY COUNT: CPT

## 2019-04-30 PROCEDURE — 80048 BASIC METABOLIC PNL TOTAL CA: CPT

## 2019-04-30 PROCEDURE — 81001 URINALYSIS AUTO W/SCOPE: CPT

## 2019-04-30 PROCEDURE — 83735 ASSAY OF MAGNESIUM: CPT

## 2019-04-30 PROCEDURE — 84100 ASSAY OF PHOSPHORUS: CPT

## 2019-05-02 LAB
BACTERIA UR CULT: NORMAL
SIGNIFICANT IND 70042: NORMAL
SITE SITE: NORMAL
SOURCE SOURCE: NORMAL

## 2019-05-03 ENCOUNTER — TELEPHONE (OUTPATIENT)
Dept: CARDIOLOGY | Facility: MEDICAL CENTER | Age: 74
End: 2019-05-03

## 2019-05-03 DIAGNOSIS — E87.6 HYPOKALEMIA: ICD-10-CM

## 2019-05-03 DIAGNOSIS — E83.42 HYPOMAGNESEMIA: ICD-10-CM

## 2019-05-03 RX ORDER — POTASSIUM CHLORIDE 600 MG/1
8 TABLET, FILM COATED, EXTENDED RELEASE ORAL DAILY
Qty: 90 TAB | Refills: 3 | Status: SHIPPED | OUTPATIENT
Start: 2019-05-03 | End: 2020-04-09

## 2019-05-03 NOTE — TELEPHONE ENCOUNTER
----- Message from Scarlett Little R.N. sent at 5/1/2019  9:40 AM PDT -----  SUSAN wilkerson/ Dr Tucker 10/8/19

## 2019-05-03 NOTE — TELEPHONE ENCOUNTER
Creatinine much improved off spironolactone. Continue current meds except add kcl 8mEq daily for hypokalemia, and increase magnesium oxide to 400mg PO bid for hypomagnesia.  F/u as scheduled. Thanks!

## 2019-05-03 NOTE — TELEPHONE ENCOUNTER
Called pt, discussed labs per Dr Tucker and his recommendations, pt reports he already take Mag Ox 400mg TID, he will increased it to QID.     New Rx for KCL 8meq PO daily and Mag Ox 400 QID sent to [facility] to Dr Tucker

## 2019-05-06 DIAGNOSIS — Z79.4 TYPE 2 DIABETES MELLITUS WITH HYPERGLYCEMIA, WITH LONG-TERM CURRENT USE OF INSULIN (HCC): ICD-10-CM

## 2019-05-06 DIAGNOSIS — E11.65 TYPE 2 DIABETES MELLITUS WITH HYPERGLYCEMIA, WITH LONG-TERM CURRENT USE OF INSULIN (HCC): ICD-10-CM

## 2019-05-14 ENCOUNTER — OFFICE VISIT (OUTPATIENT)
Dept: WOUND CARE | Facility: MEDICAL CENTER | Age: 74
End: 2019-05-14
Attending: INTERNAL MEDICINE
Payer: MEDICARE

## 2019-05-14 VITALS
TEMPERATURE: 97.6 F | RESPIRATION RATE: 18 BRPM | DIASTOLIC BLOOD PRESSURE: 84 MMHG | OXYGEN SATURATION: 94 % | HEART RATE: 68 BPM | SYSTOLIC BLOOD PRESSURE: 144 MMHG

## 2019-05-14 DIAGNOSIS — Z86.73 HISTORY OF CEREBROVASCULAR ACCIDENT (CVA) IN ADULTHOOD: ICD-10-CM

## 2019-05-14 DIAGNOSIS — L97.912 CHRONIC ULCER OF RIGHT LOWER EXTREMITY WITH FAT LAYER EXPOSED (HCC): ICD-10-CM

## 2019-05-14 DIAGNOSIS — L97.922 CHRONIC ULCER OF LEFT LOWER EXTREMITY WITH FAT LAYER EXPOSED (HCC): ICD-10-CM

## 2019-05-14 DIAGNOSIS — I77.89 TIBIAL ARTERY DISEASE (HCC): ICD-10-CM

## 2019-05-14 DIAGNOSIS — G81.94 LEFT HEMIPARESIS (HCC): ICD-10-CM

## 2019-05-14 PROCEDURE — 99212 OFFICE O/P EST SF 10 MIN: CPT | Performed by: NURSE PRACTITIONER

## 2019-05-14 PROCEDURE — 99213 OFFICE O/P EST LOW 20 MIN: CPT

## 2019-05-14 ASSESSMENT — ENCOUNTER SYMPTOMS
SHORTNESS OF BREATH: 0
NERVOUS/ANXIOUS: 0
CHILLS: 0
SENSORY CHANGE: 1
CONSTIPATION: 0
VOMITING: 0
DEPRESSION: 0
COUGH: 0
FEVER: 0
NAUSEA: 0
DIARRHEA: 0

## 2019-05-14 ASSESSMENT — PAIN SCALES - GENERAL: PAINLEVEL: NO PAIN

## 2019-05-14 NOTE — PATIENT INSTRUCTIONS
Keep dressing clean and dry and cover while bathing. Only change dressing if over saturated, soiled or its falling off.     Should you experience any significant changes in your wound(s) such as infection (redness, swelling, localized heat, increased pain, fever >101 F, chills) or have any questions regarding your home care instructions, please contact the wound center [phone]. If after hours, contact your primary care physician or go the hospital emergency room.

## 2019-05-14 NOTE — PROGRESS NOTES
Provider Encounter- Full Thickness wound      Patient seen in collaboration with wound care clinician,  Lo Archuleta RN    HISTORY OF PRESENT ILLNESS        START OF CARE IN CLINIC: 9/26/18    REFERRING PROVIDER: Trinidad Maguire MD     WOUND- Full thickness wounds x 4   LOCATION: Left medial ankle-eschar             Anterior left lower leg-eschar                                   Posterior left lower leg-full-thickness                                   Postero/lateral right lower leg-eschar   WOUND HISTORY: Anterior and posterior left lower leg wounds present since June or July 2018.  Left medial ankle and right posterolateral wounds present since September 2018.  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  Patient has been seen by vascular surgeon, Dr. Kena Terry.  Vascular intervention is not appropriate at this time.  Wounds have remained stable for several months.  Wife is treating these at home, Aquacel Ag to posterior left lower leg wound, Betadine to all other wounds.  Patient presents to the wound clinic every 2 weeks for reassessment.  Per Dr. Terry, no aggressive debridement.   PREVIOUS TREATMENT: Aquacel Ag to left posterior lower leg wound, Betadine to eschar of all other wounds   PERTINENT PMH: CVA with left hemiparesis, CAD, type 2 diabetes, CKD stage III     IMAGING: N/A   VASCULAR STUDIES:2/7/8 Lower extremity arterial duplex imaging, and ABIs      LAST  WOUND CULTURE:  DATE : 11/8/18-negative         DIABETES: Yes  TOBACCO USE: Never smoked      12/27: Patient placed on my schedule today for provider assessment.  Reviewed Dr. Terry's previous notes, no aggressive debridement to these wounds.  Wife is been treating the necrotic wounds with Betadine daily to keep dry and stable, and applying Aquacel Ag to left posterior lower leg wound 1-2 times per week.  All wounds are stable.  We will decrease frequency of clinic visits to 1 time per month.  Patient's wife understands  that if patient's wounds deteriorate in between appointments, she is to call the clinic immediately.    5/14: Patient placed my schedule today for provider assessment.  He has been seen monthly for monitoring of his wounds.  All of his wounds are stable, with no drainage or fluctuance.  No debridement required.    RESULTS:   Most recent A1c: 7.0 DATE 11/20/18    VASCULAR STUDIES  2/7/18-lower Extremity   Arterial Duplex Report  CONCLUSIONS   Right Lower Extremity-   Normal flow from the common femoral artery extending distally to the    popliteal artery. Flow within the posterior tibial artery is brisk and    multiphasic.   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     Left Lower Extremity-   Normal flow seen from the common femoral artery extending distally to the    popliteal artery.   Area of elevated velocities seen within the mid segment of the posterior    tibial artery. Consistent with >50% stenosis.   Occlusion of the anterior tibial artery at the mid-distal segment, minimal    flow reversal seen at the level of the ankle.    2/7/18-LE ART/PADMINI  Right lower extremity PADMINI 1.14  Left lower extremity PADMINI 1.06    Findings   Bilateral.    Doppler waveform of the common femoral artery is of high amplitude and    triphasic.    Doppler waveforms at the ankle are brisk and biphasic.    Absent flow within the Left VIVIENNE.     Arterial duplex scan was performed in accordance with lower extremity    arterial evaluation protocol - see separate report.  PAST MEDICAL HISTORY:   Past Medical History:   Diagnosis Date   • Acute respiratory failure with hypoxia ([facility]) 5/20/2017   • CAD (coronary artery disease)     GIOVANNA to RCA in '97, GIOVANNA X2 to LAD and GIOVANNA X2 to OM in '09   • Cancer ([facility])     2017; chemo lympoma   • Cataract    • Cerebrovascular accident (CVA) ([facility]) 12/30/2016    Left arm weakness  etiology of stroke not established, lymphoma discovered on  MRI evaluation of stroke, L hemiparesis much worse after acute infectious illness in mid 2017, but no specific diagnosed recurrent neurological etiology, all at [facility]   • CKD (chronic kidney disease) stage 3, GFR 30-59 ml/min ([facility])    • Controlled gout 2014   • Coronary atherosclerosis of native coronary artery     S/P PTCA (percutaneous transluminal coronary angioplasty), RCA, 5/1997, patent on cath 7/10/2009 at the time of interventions on his left anterior descending and circumflex coronary arteries   • Depression    • Diabetes ([facility])    • Enterococcal septicemia ([facility]) 8/12/2017   • Hypertension    • Hypokalemia 2012    controlled with combination of ACE inhibitor or ARB plus spironolactone   • Hypomagnesemia 08/12/2017    etiology uncertain   • Lymphoma ([facility]) 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 Barry   • NSTEMI (non-ST elevated myocardial infarction) ([facility]) 07/18/2017    complicating UTI with sepsis   • Pain    • Polyneuropathy in diabetes(357.2) 9/11/2013   • Septic shock ([facility]) 5/20/2017   • Skin ulcer of calf ([facility]) 2015    Right, Dr. Terry and wound care   • Stroke ([facility]) 2016    left sided weakness   • Urinary bladder disorder    • Urinary incontinence    • Wound of left leg 2012    Requiring surgery and debridment, Dr. Moore       PAST SURGICAL HISTORY:   Past Surgical History:   Procedure Laterality Date   • CYSTOSCOPY STENT PLACEMENT Left 2/12/2018    Procedure: CYSTOSCOPY  ;  Surgeon: Rey Barry M.D.;  Location: SURGERY [facility];  Service: Urology   • URETEROSCOPY Left 2/12/2018    Procedure: URETEROSCOPY- FLEXIBLE  ;  Surgeon: Rey Barry M.D.;  Location: SURGERY [facility];  Service: Urology   • LASERTRIPSY Left 2/12/2018    Procedure: LASERTRIPSY - LITHO  ;  Surgeon: Rey Barry M.D.;  Location: SURGERY [facility];  Service: Urology   • WOUND CLOSURE GENERAL   4/3/2012    Performed by GERHARD GILBERT at SURGERY SAME DAY [facility] ORS   • [facility] CARDIAC CATH  2009    Stents to LAD, Om   • DAGOBERTO BY LAPAROSCOPY  1998   • ANGIOPLASTY  1997    RCA followed by other stents as noted above.    • JEFFREY CARDIAC CATH  1997    stent RCA   • CATARACT EXTRACTION WITH IOL      bilateral   • LITHOTRIPSY     • TONSILLECTOMY AND ADENOIDECTOMY          MEDICATIONS:   Current Outpatient Prescriptions   Medication   • Insulin Pen Needle 32 G x 4 mm (BD PEN NEEDLE SWATI U/F)   • magnesium oxide (MAGNESIUM-OXIDE) 400 (241.3 Mg) MG Tab tablet   • potassium chloride (KLOR-CON) 8 MEQ tablet   • losartan (COZAAR) 25 MG Tab   • TRULICITY 1.5 MG/0.5ML Solution Pen-injector   • metoprolol (TOPROL-XL) 200 MG XL tablet   • ONE TOUCH ULTRA TEST strip   • DAILY MULTIPLE VITAMINS PO   • ONE TOUCH ULTRA TEST strip   • insulin lispro, Human, (HUMALOG KWIKPEN) 100 UNIT/ML Solution Pen-injector injection   • Insulin Glargine (TOUJEO MAX SOLOSTAR) 300 UNIT/ML Solution Pen-injector   • atorvastatin (LIPITOR) 40 MG Tab   • allopurinol (ZYLOPRIM) 300 MG Tab   • acetaminophen (TYLENOL) 500 MG Tab   • fenofibrate (TRICOR) 145 MG Tab   • clopidogrel (PLAVIX) 75 MG Tab   • fluoxetine (PROZAC) 40 MG capsule   • ascorbic acid (VITAMIN C) 500 MG tablet   • Cholecalciferol (VITAMIN D) 2000 units Cap   • aspirin 81 MG tablet     No current facility-administered medications for this visit.        ALLERGIES:    Allergies   Allergen Reactions   • Diphenhydramine Hcl Anxiety     Pt is able to tolerate  Mg benadryl with less anxiety   • Lorazepam Unspecified     Disorientation   • Ciprofloxacin      Rash,stomach ache         SOCIAL HISTORY:   Social History     Social History   • Marital status:      Spouse name: N/A   • Number of children: N/A   • Years of education: N/A     Social History Main Topics   • Smoking status: Never Smoker   • Smokeless tobacco: Never Used   • Alcohol use No   • Drug use: No   • Sexual activity:  Not Currently     Partners: Female     Other Topics Concern   • Not on file     Social History Narrative   • No narrative on file       FAMILY HISTORY:   Family History   Problem Relation Age of Onset   • Heart Disease Father         CAD   • Diabetes Father    • Cancer Mother    • Psychiatry Mother         Depression   • Depression Mother    • Kidney stones Brother    • Heart Disease Brother    • Psychiatry Brother         Depression   • Depression Brother    • Suicide Attempts Other    • Psychiatry Other         autism        REVIEW OF SYSTEMS:   Review of Systems   Constitutional: Negative for chills and fever.   Respiratory: Negative for cough and shortness of breath.    Cardiovascular: Negative for chest pain and leg swelling.   Gastrointestinal: Negative for constipation, diarrhea, nausea and vomiting.   Genitourinary: Negative for dysuria.   Musculoskeletal: Positive for joint pain.        Left hemiparesis, physical therapy twice per week  Uses cane or walker for ambulation   Neurological: Positive for sensory change.        Diminished sensation to the left side of his body due to CVA   Psychiatric/Behavioral: Negative for depression. The patient is not nervous/anxious.        PHYSICAL EXAMINATION:   /84   Pulse 68   Temp 36.4 °C (97.6 °F)   Resp 18   SpO2 94%   Physical Exam   Constitutional: He is oriented to person, place, and time and well-developed, well-nourished, and in no distress.   HENT:   Head: Normocephalic.   Eyes: Pupils are equal, round, and reactive to light.   Cardiovascular:   Right DP and PT pulses palpable  Unable to palpate left pedal pulses   Pulmonary/Chest: Effort normal.   Musculoskeletal: Normal range of motion.   Neurological: He is alert and oriented to person, place, and time.   Skin: Skin is warm.   Necrotic wounds to left medial ankle, anterior left lower leg, and right postero-lateral lower leg  Shallow open wound to left posterior lower leg  Refer to wound flowsheet    Psychiatric: Affect normal.       Wound Assessment- Refer to wound flowsheet      Wound photos, no debridement of any wounds today.  Right lateral ankle, resolved    Left medial ankle    Right lateral ankle    Right anterior lower leg              PROCEDURE  No debridement of wounds today due to arterial insufficiency  Wound care completed by Kenny-necrotic wounds painted with Betadine, left posterior calf wound treated with Aquacel Ag.    PATIENT EDUCATION  -Advised to go to ER for any increased redness, swelling, drainage or odor, or if patient develops fever, chills, nausea or vomiting.  -Importance of adequate nutrition for wound healing  -Increase protein intake (unless contraindicated by renal status)    ASSESSMENT AND PLAN:   1. Chronic ulcer of left lower extremity with fat layer exposed (HCC)  Full-thickness wound posterior lower leg, necrotic wounds to medial ankle and anterior lower leg, covered with stable eschar.  5/14: Wounds assessed in clinic today.  No debridement per instructions by Dr. Terry.    -Wound debrided with Betadine, left open to air  -Wife to continue wound care.  Patient to return to clinic 1 time per month for assessment.  Patient and his wife understand that he is to return to the clinic ASAP if wounds deteriorate.  -If wounds are remained stable next clinic visit, discharge from [facility].    2. Chronic ulcer of right lower extremity with fat layer exposed (HCC)  Necrotic wound to posterior/lateral lower leg, covered with stable eschar  5/14: Wound assessed in clinic today.  No debridement as per Dr. Terry's instructions.  Wound treated with Betadine.  Wife to continue wound care.  Patient to return to clinic 1 time per month.  See above    3.  Tibial artery disease  Occlusions of tibial arteries bilaterally.  Complicating factor, impairing wound healing potential.  Patient has been seen by vascular surgeon, not a candidate for surgery at this time.    4. (HCC) Left  hemiparesis  Complicating factor.  Limited mobility.  Patient at risk for pressure injury and trauma.    5.. H/O Cerebrovascular accident (CVA) in adulthood  Resulting in left hemiparesis.      Please note that this dictation was created using voice recognition software. I have worked with technical experts from [facility] to optimize the interface.  I have made every reasonable attempt to correct obvious errors, but there may be errors of grammar and possibly content that I did not discover before finalizing the note.

## 2019-05-20 ENCOUNTER — OFFICE VISIT (OUTPATIENT)
Dept: MEDICAL GROUP | Facility: MEDICAL CENTER | Age: 74
End: 2019-05-20
Payer: MEDICARE

## 2019-05-20 VITALS
OXYGEN SATURATION: 97 % | SYSTOLIC BLOOD PRESSURE: 146 MMHG | TEMPERATURE: 96.8 F | BODY MASS INDEX: 27.09 KG/M2 | WEIGHT: 200 LBS | HEIGHT: 72 IN | HEART RATE: 65 BPM | DIASTOLIC BLOOD PRESSURE: 84 MMHG

## 2019-05-20 DIAGNOSIS — S76.311A STRAIN OF RIGHT HAMSTRING, INITIAL ENCOUNTER: ICD-10-CM

## 2019-05-20 DIAGNOSIS — M24.851 SNAPPING HIP SYNDROME, RIGHT: ICD-10-CM

## 2019-05-20 DIAGNOSIS — S76.011A STRAIN OF FLEXOR MUSCLE OF RIGHT HIP, INITIAL ENCOUNTER: ICD-10-CM

## 2019-05-20 DIAGNOSIS — S76.011A MUSCLE STRAIN OF RIGHT GLUTEAL REGION, INITIAL ENCOUNTER: ICD-10-CM

## 2019-05-20 DIAGNOSIS — R29.898 LEFT HAND WEAKNESS: ICD-10-CM

## 2019-05-20 PROCEDURE — 99214 OFFICE O/P EST MOD 30 MIN: CPT | Performed by: FAMILY MEDICINE

## 2019-05-21 NOTE — PROGRESS NOTES
Subjective:   Chief Complaint/History of Present Illness:  Av Bob is a 74 y.o. male established patient who presents today to discuss medical problems as listed below    Diagnoses of Snapping hip syndrome, right, Strain of flexor muscle of right hip, initial encounter, Strain of right hamstring, initial encounter, Muscle strain of right gluteal region, initial encounter, and Left hand weakness were pertinent to this visit.    Snapping hip syndrome, right  New problem, uncontrolled, patient with right lower extremity pain is described as aching, worse with ambulation, improved with rest and sitting.  Patient does not have radicular pain down the posterior aspect of his right lower extremity, however can have some muscle cramping and tightness of his quadriceps muscles.  On physical exam, I determined the patient has a combination of conditions at present time: Snapping hip syndrome, strain of flexor muscles of right hip, right hamstring strain, and gluteal muscle strain.    We did review how patient is using his cane, that patient should try to use the left hand (i.e. his weaker side) to stabilize himself as he walks such that he will be putting more pressure and strain onto all the muscles of his upper portion of right lower extremity.  Patient verbalized understanding, and we will also be adding on physical therapy, for his right lower extremity, as well as occupational therapy, for his left hand which has some sequelae of weakness and discoordination secondary to his former cerebrovascular accident.    ROS is NEGATIVE for BLE radicular pain, saddle paresthesias, bowel or bladder incontinence, gait instability    Strain of flexor muscle of right hip  Please see notes from same day service 05/20/2019 re: snapping hip syndrome    Strain of right hamstring  Please see notes from same day service 05/20/2019 re: snapping hip syndrome    Muscle strain of right gluteal region  Please see notes from same  day service 05/20/2019 re: snapping hip syndrome    Left hand weakness  This is a chronic sequelae of cerebrovascular accident, uncontrolled, patient with mild atrophy of the muscles of his left hand, as well as diminished  strength and coordination of the left hand.      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, without long-term current use of insulin 12/30/2016     Priority: Medium   • H/O Cerebrovascular accident (CVA) in adulthood 12/30/2016     Priority: Medium   • Coronary artery disease involving native coronary artery of native heart without angina pectoris 12/30/2016     Priority: Medium   • (HCC) Hyperlipidemia associated with type 2 diabetes mellitus 12/13/2011     Priority: Medium   • GERD with esophagitis 10/19/2018     Priority: Low   • Recurrent UTI 04/26/2018     Priority: Low   • (HCC) Left hemiparesis 12/27/2017     Priority: Low   • (HCC) Diabetic nephropathy associated with type 2 diabetes mellitus 11/30/2017     Priority: Low   • Psychophysiological insomnia 11/21/2017     Priority: Low   • (HCC) Moderate episode of recurrent major depressive disorder 11/07/2017     Priority: Low   • Wheelchair dependent 11/07/2017     Priority: Low   • Normocytic hypochromic anemia 05/20/2017     Priority: Low   • H/O non-Hodgkin's lymphoma 05/08/2017     Priority: Low   • (HCC) CKD (chronic kidney disease) stage 3, GFR 30-59 ml/min 08/25/2016     Priority: Low   • Idiopathic chronic gout of multiple sites without tophus 01/28/2014     Priority: Low   • Snapping hip syndrome, right 05/20/2019   • Strain of flexor muscle of right hip 05/20/2019   • Strain of right hamstring 05/20/2019   • Muscle strain of right gluteal  region 05/20/2019   • Left hand weakness 05/20/2019   • Urinary incontinence without sensory awareness 02/19/2019   • Chronic ulcer of left lower extremity with fat layer exposed (HCC) 12/27/2018   • Chronic ulcer of right lower extremity with fat layer exposed ([facility]) 12/27/2018   • ([facility]) Tibial artery disease 12/27/2018       Additional History:   Allergies:    Diphenhydramine hcl; Lorazepam; and Ciprofloxacin     Current Medications:     Current Outpatient Prescriptions   Medication Sig Dispense Refill   • diclofenac (SOLARAZE) 3 % gel Apply 1 Application to affected area(s) 2 times a day. 100 g 0   • Insulin Pen Needle 32 G x 4 mm (BD PEN NEEDLE SWATI U/F) For insulin shots 5 times a day 450 Each 1   • magnesium oxide (MAGNESIUM-OXIDE) 400 (241.3 Mg) MG Tab tablet Take 1 Tab by mouth 4 times a day. 120 Tab 11   • potassium chloride (KLOR-CON) 8 MEQ tablet Take 1 Tab by mouth every day. 90 Tab 3   • losartan (COZAAR) 25 MG Tab TAKE 1 TABLET BY MOUTH TWICE DAILY 180 Tab 1   • TRULICITY 1.5 MG/0.5ML Solution Pen-injector INJECT 1.5MG AS DIRECTED EVERY 7 DAYS 6 mL 0   • metoprolol (TOPROL-XL) 200 MG XL tablet Take 1 Tab by mouth every evening. 90 Tab 3   • ONE TOUCH ULTRA TEST strip USE TO TEST UP TO 5 TIMES A DAY. 450 Strip 2   • DAILY MULTIPLE VITAMINS PO Take  by mouth.     • ONE TOUCH ULTRA TEST strip USE TO TEST UP TO FIVE TIMES DAILY 450 Strip 0   • insulin lispro, Human, (HUMALOG KWIKPEN) 100 UNIT/ML Solution Pen-injector injection Inject 20 Units as instructed every day. (Patient taking differently: Inject 0-5 Units as instructed every day.) 96 mL 6   • Insulin Glargine (TOUJEO MAX SOLOSTAR) 300 UNIT/ML Solution Pen-injector Inject 45 Units as instructed every bedtime. (Patient taking differently: Inject 40 Units as instructed every bedtime.) 9 PEN 3   • atorvastatin (LIPITOR) 40 MG Tab TAKE 1 TABLET BY MOUTH EVERY DAY 90 Tab 2   • allopurinol (ZYLOPRIM) 300 MG Tab Take 150 mg by mouth every day.     •  acetaminophen (TYLENOL) 500 MG Tab Take 1,000 mg by mouth every 6 hours as needed for Mild Pain.     • fenofibrate (TRICOR) 145 MG Tab Take 1 Tab by mouth every day. 90 Tab 3   • clopidogrel (PLAVIX) 75 MG Tab Take 1 Tab by mouth every day. 90 Tab 2   • fluoxetine (PROZAC) 40 MG capsule TAKE 1 CAPSULE BY MOUTH EVERY DAY 90 Cap 3   • ascorbic acid (VITAMIN C) 500 MG tablet Take 500 mg by mouth every day.     • Cholecalciferol (VITAMIN D) 2000 units Cap Take 1 Cap by mouth 2 Times a Day.     • aspirin 81 MG tablet Take 81 mg by mouth every day.       No current facility-administered medications for this visit.         Social History:     Social History   Substance Use Topics   • Smoking status: Never Smoker   • Smokeless tobacco: Never Used   • Alcohol use No       ROS:     - NOTE: All other systems reviewed and are negative, except as in HPI.     Objective:   Physical Exam:    Vitals: /84 (BP Location: Left arm, Patient Position: Sitting)   Pulse 65   Temp 36 °C (96.8 °F) (Temporal)   Ht 1.829 m (6')   Wt 90.7 kg (200 lb)   SpO2 97%    BMI: Body mass index is 27.12 kg/m².   General/Constitutional: Vitals as above, Well nourished, well developed male in no acute distress   Head/Eyes: Head is grossly normal & atraumatic, bilateral conjunctivae clear and not injected, bilateral EOMI, bilateral PERRL   ENT: Bilateral external ears grossly normal in appearance, Hearing grossly intact, External nares normal in appearance and without discharge/bleeding   Respiratory: No respiratory distress, bilateral lungs are clear to ausculation in all lung fields (anterior/lateral/posterior), no wheezing/rhonchi/rales   Cardiovascular: Regular rate and rhythm without murmur/gallops/rubs, distal pulses are intact and equal bilaterally (radial, posterior tibial), no bilateral lower extremity edema   MSK: Tenderness to palpation of hip flexor of upper quadriceps extending proximally all the way up to right ASIS, extension of  right lower extremity exhibited tenderness to palpation of right hamstring and right gluteal muscles, and FADIR testing was negative for piriformis syndrome, patient with mild atrophy of the left hand with 3+ to 4- strength compared to 5 out of 5 strength on right hand, patient with altered gait due to atrophy and decreased ability to control left lower extremity   Integumentary: No apparent rashes   Psych: Judgment grossly appropriate, no apparent depression/anxiety    Health Maintenance:     - 04/18/19 -- Cologuard NEGATIVE    Imaging/Labs:     -No new labs to review    Assessment and Plan:   1. Snapping hip syndrome, right  New problem, uncontrolled, patient can continue to take Tylenol at home, as well as use topical NSAID and referral to continue home for further physical therapy for this right lower extremity condition.   - diclofenac (SOLARAZE) 3 % gel; Apply 1 Application to affected area(s) 2 times a day.  Dispense: 100 g; Refill: 0   - REFERRAL TO PHYSICAL THERAPY Reason for Therapy: Eval/Treat/Report    2. Strain of flexor muscle of right hip, initial encounter  New problem, uncontrolled, see assessment and plan as in #1, above.   - diclofenac (SOLARAZE) 3 % gel; Apply 1 Application to affected area(s) 2 times a day.  Dispense: 100 g; Refill: 0   - REFERRAL TO PHYSICAL THERAPY Reason for Therapy: Eval/Treat/Report    3. Strain of right hamstring, initial encounter  New problem, uncontrolled, see assessment and plan as in #1, above.   - diclofenac (SOLARAZE) 3 % gel; Apply 1 Application to affected area(s) 2 times a day.  Dispense: 100 g; Refill: 0   - REFERRAL TO PHYSICAL THERAPY Reason for Therapy: Eval/Treat/Report    4. Muscle strain of right gluteal region, initial encounter  New problem, uncontrolled, see assessment and plan as in #1, above.   - diclofenac (SOLARAZE) 3 % gel; Apply 1 Application to affected area(s) 2 times a day.  Dispense: 100 g; Refill: 0   - REFERRAL TO PHYSICAL THERAPY Reason for  Therapy: Eval/Treat/Report    5. Left hand weakness  New problem, uncontrolled, patient referred to occupational therapy for strengthening and hand therapy.   - REFERRAL TO OCCUPATIONAL THERAPY Reason for Therapy: Eval/Treat/Report        RTC: As needed for worsening of symptoms, otherwise as previously scheduled in June 2019.    PLEASE NOTE: This dictation was created using voice recognition software. I have made every reasonable attempt to correct obvious errors, but I expect that there are errors of grammar and possibly content that I did not discover before finalizing the note.

## 2019-05-21 NOTE — ASSESSMENT & PLAN NOTE
This is a chronic sequelae of cerebrovascular accident, uncontrolled, patient with mild atrophy of the muscles of his left hand, as well as diminished  strength and coordination of the left hand.

## 2019-05-21 NOTE — ASSESSMENT & PLAN NOTE
New problem, uncontrolled, patient with right lower extremity pain is described as aching, worse with ambulation, improved with rest and sitting.  Patient does not have radicular pain down the posterior aspect of his right lower extremity, however can have some muscle cramping and tightness of his quadriceps muscles.  On physical exam, I determined the patient has a combination of conditions at present time: Snapping hip syndrome, strain of flexor muscles of right hip, right hamstring strain, and gluteal muscle strain.    We did review how patient is using his cane, that patient should try to use the left hand (i.e. his weaker side) to stabilize himself as he walks such that he will be putting more pressure and strain onto all the muscles of his upper portion of right lower extremity.  Patient verbalized understanding, and we will also be adding on physical therapy, for his right lower extremity, as well as occupational therapy, for his left hand which has some sequelae of weakness and discoordination secondary to his former cerebrovascular accident.    ROS is NEGATIVE for BLE radicular pain, saddle paresthesias, bowel or bladder incontinence, gait instability

## 2019-05-22 ENCOUNTER — HOSPITAL ENCOUNTER (OUTPATIENT)
Dept: LAB | Facility: MEDICAL CENTER | Age: 74
End: 2019-05-22
Attending: INTERNAL MEDICINE
Payer: MEDICARE

## 2019-05-22 DIAGNOSIS — E55.9 VITAMIN D DEFICIENCY: ICD-10-CM

## 2019-05-22 DIAGNOSIS — N18.30 CKD (CHRONIC KIDNEY DISEASE) STAGE 3, GFR 30-59 ML/MIN (HCC): ICD-10-CM

## 2019-05-22 LAB
25(OH)D3 SERPL-MCNC: 35 NG/ML (ref 30–100)
ANION GAP SERPL CALC-SCNC: 7 MMOL/L (ref 0–11.9)
BUN SERPL-MCNC: 34 MG/DL (ref 8–22)
CALCIUM SERPL-MCNC: 8.8 MG/DL (ref 8.5–10.5)
CHLORIDE SERPL-SCNC: 105 MMOL/L (ref 96–112)
CO2 SERPL-SCNC: 27 MMOL/L (ref 20–33)
CREAT SERPL-MCNC: 1.53 MG/DL (ref 0.5–1.4)
GLUCOSE SERPL-MCNC: 156 MG/DL (ref 65–99)
POTASSIUM SERPL-SCNC: 3.7 MMOL/L (ref 3.6–5.5)
PTH-INTACT SERPL-MCNC: 30.9 PG/ML (ref 14–72)
SODIUM SERPL-SCNC: 139 MMOL/L (ref 135–145)

## 2019-05-22 PROCEDURE — 36415 COLL VENOUS BLD VENIPUNCTURE: CPT

## 2019-05-22 PROCEDURE — 83970 ASSAY OF PARATHORMONE: CPT

## 2019-05-22 PROCEDURE — 80048 BASIC METABOLIC PNL TOTAL CA: CPT

## 2019-05-22 PROCEDURE — 82306 VITAMIN D 25 HYDROXY: CPT

## 2019-05-23 ENCOUNTER — TELEPHONE (OUTPATIENT)
Dept: MEDICAL GROUP | Facility: MEDICAL CENTER | Age: 74
End: 2019-05-23

## 2019-05-23 NOTE — TELEPHONE ENCOUNTER
VOICEMAIL Wednesday 1:23pm  1. Caller Name: Yarely @ [facility]                 Call Back Number: [phone]    2. Message: Yarely is requesting medical records for his last office notes to continue his referral for [facility] for Occupational therapy. Fax number is [phone].    5/23/19 I faxed last office noted to Yarely at [facility] at 11:32am.    3. Patient approves office to leave a detailed voicemail/MyChart message: yes

## 2019-05-30 ENCOUNTER — APPOINTMENT (OUTPATIENT)
Dept: NEPHROLOGY | Facility: MEDICAL CENTER | Age: 74
End: 2019-05-30
Payer: MEDICARE

## 2019-06-09 RX ORDER — FLUOXETINE HYDROCHLORIDE 40 MG/1
CAPSULE ORAL
Qty: 90 CAP | Refills: 2 | Status: SHIPPED | OUTPATIENT
Start: 2019-06-09 | End: 2020-01-22

## 2019-06-09 NOTE — TELEPHONE ENCOUNTER
Was the patient seen in the last year in this department? Yes lov 5/20/19    Does patient have an active prescription for medications requested? No     Received Request Via: Pharmacy

## 2019-06-11 ENCOUNTER — OFFICE VISIT (OUTPATIENT)
Dept: WOUND CARE | Facility: MEDICAL CENTER | Age: 74
End: 2019-06-11
Attending: INTERNAL MEDICINE
Payer: MEDICARE

## 2019-06-11 VITALS
OXYGEN SATURATION: 94 % | RESPIRATION RATE: 18 BRPM | DIASTOLIC BLOOD PRESSURE: 88 MMHG | TEMPERATURE: 98 F | HEART RATE: 70 BPM | SYSTOLIC BLOOD PRESSURE: 136 MMHG

## 2019-06-11 DIAGNOSIS — I77.89 TIBIAL ARTERY DISEASE (HCC): ICD-10-CM

## 2019-06-11 DIAGNOSIS — L97.912 CHRONIC ULCER OF RIGHT LOWER EXTREMITY WITH FAT LAYER EXPOSED (HCC): ICD-10-CM

## 2019-06-11 DIAGNOSIS — L97.922 CHRONIC ULCER OF LEFT LOWER EXTREMITY WITH FAT LAYER EXPOSED (HCC): ICD-10-CM

## 2019-06-11 DIAGNOSIS — Z86.73 HISTORY OF CEREBROVASCULAR ACCIDENT (CVA) IN ADULTHOOD: ICD-10-CM

## 2019-06-11 DIAGNOSIS — G81.94 LEFT HEMIPARESIS (HCC): ICD-10-CM

## 2019-06-11 PROCEDURE — 99212 OFFICE O/P EST SF 10 MIN: CPT

## 2019-06-11 PROCEDURE — 99212 OFFICE O/P EST SF 10 MIN: CPT | Performed by: NURSE PRACTITIONER

## 2019-06-11 ASSESSMENT — ENCOUNTER SYMPTOMS
NAUSEA: 0
SHORTNESS OF BREATH: 0
CONSTIPATION: 0
CHILLS: 0
SENSORY CHANGE: 1
DIARRHEA: 0
VOMITING: 0
FEVER: 0
DEPRESSION: 0
COUGH: 0
NERVOUS/ANXIOUS: 0

## 2019-06-11 NOTE — PROGRESS NOTES
Provider Encounter- Full Thickness wound      Patient seen in collaboration with wound care clinician,  Lo Archuleta RN    HISTORY OF PRESENT ILLNESS        START OF CARE IN CLINIC: 9/26/18    REFERRING PROVIDER: Trinidad Maguire MD     WOUND- Full thickness wounds x 4   LOCATION: Left medial ankle-eschar             Anterior left lower leg-eschar                                   Posterior left lower leg-full-thickness                                   Postero/lateral right lower leg-eschar   WOUND HISTORY: Anterior and posterior left lower leg wounds present since June or July 2018.  Left medial ankle and right posterolateral wounds present since September 2018.  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  Patient has been seen by vascular surgeon, Dr. Kena Terry.  Vascular intervention is not appropriate at this time.  Wounds have remained stable for several months.  Wife is treating these at home, Aquacel Ag to posterior left lower leg wound, Betadine to all other wounds.  Patient presents to the wound clinic every 2 weeks for reassessment.  Per Dr. Terry, no aggressive debridement.   PREVIOUS TREATMENT: Aquacel Ag to left posterior lower leg wound, Betadine to eschar of all other wounds   PERTINENT PMH: CVA with left hemiparesis, CAD, type 2 diabetes, CKD stage III     IMAGING: N/A   VASCULAR STUDIES:2/7/8 Lower extremity arterial duplex imaging, and ABIs      LAST  WOUND CULTURE:  DATE : 11/8/18-negative         DIABETES: Yes  TOBACCO USE: Never smoked      12/27: Patient placed on my schedule today for provider assessment.  Reviewed Dr. Terry's previous notes, no aggressive debridement to these wounds.  Wife is been treating the necrotic wounds with Betadine daily to keep dry and stable, and applying Aquacel Ag to left posterior lower leg wound 1-2 times per week.  All wounds are stable.  We will decrease frequency of clinic visits to 1 time per month.  Patient's wife understands  that if patient's wounds deteriorate in between appointments, she is to call the clinic immediately.    5/14: Patient placed my schedule today for provider assessment.  He has been seen monthly for monitoring of his wounds.  All of his wounds are stable, with no drainage or fluctuance.  No debridement required.    6/11: All wounds stable.  No need for advanced wound care services.  Patient discharged from [facility].  He and his wife were advised to return to clinic ASAP if wounds deteriorate, or if he develops new wounds.  They were provided with my contact info, including email.  Encouraged to contact me for any problems or concerns.    RESULTS:   Most recent A1c: 7.0 DATE 11/20/18    VASCULAR STUDIES  2/7/18-lower Extremity   Arterial Duplex Report  CONCLUSIONS   Right Lower Extremity-   Normal flow from the common femoral artery extending distally to the    popliteal artery. Flow within the posterior tibial artery is brisk and    multiphasic.   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     Left Lower Extremity-   Normal flow seen from the common femoral artery extending distally to the    popliteal artery.   Area of elevated velocities seen within the mid segment of the posterior    tibial artery. Consistent with >50% stenosis.   Occlusion of the anterior tibial artery at the mid-distal segment, minimal    flow reversal seen at the level of the ankle.    2/7/18-LE ART/PADMINI  Right lower extremity PADMINI 1.14  Left lower extremity PADMINI 1.06    Findings   Bilateral.    Doppler waveform of the common femoral artery is of high amplitude and    triphasic.    Doppler waveforms at the ankle are brisk and biphasic.    Absent flow within the Left VIVIENNE.     Arterial duplex scan was performed in accordance with lower extremity    arterial evaluation protocol - see separate report.  PAST MEDICAL HISTORY:   Past Medical History:   Diagnosis Date   • Acute  respiratory failure with hypoxia ([facility]) 5/20/2017   • CAD (coronary artery disease)     GIOVANNA to RCA in '97, GIOVANNA X2 to LAD and GIOVANNA X2 to OM in '09   • Cancer ([facility])     2017; chemo lympoma   • Cataract    • Cerebrovascular accident (CVA) ([facility]) 12/30/2016    Left arm weakness  etiology of stroke not established, lymphoma discovered on MRI evaluation of stroke, L hemiparesis much worse after acute infectious illness in mid 2017, but no specific diagnosed recurrent neurological etiology, all at [facility]   • CKD (chronic kidney disease) stage 3, GFR 30-59 ml/min ([facility])    • Controlled gout 2014   • Coronary atherosclerosis of native coronary artery     S/P PTCA (percutaneous transluminal coronary angioplasty), RCA, 5/1997, patent on cath 7/10/2009 at the time of interventions on his left anterior descending and circumflex coronary arteries   • Depression    • Diabetes ([facility])    • Enterococcal septicemia ([facility]) 8/12/2017   • Hypertension    • Hypokalemia 2012    controlled with combination of ACE inhibitor or ARB plus spironolactone   • Hypomagnesemia 08/12/2017    etiology uncertain   • 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 Barry   • NSTEMI (non-ST elevated myocardial infarction) ([facility]) 07/18/2017    complicating UTI with sepsis   • Pain    • Polyneuropathy in diabetes(357.2) 9/11/2013   • Septic shock ([facility]) 5/20/2017   • Skin ulcer of calf ([facility]) 2015    Right, Dr. Terry and wound care   • Stroke ([facility]) 2016    left sided weakness   • Urinary bladder disorder    • Urinary incontinence    • Wound of left leg 2012    Requiring surgery and debridment, Dr. Moore       PAST SURGICAL HISTORY:   Past Surgical History:   Procedure Laterality Date   • CYSTOSCOPY STENT PLACEMENT Left 2/12/2018    Procedure: CYSTOSCOPY  ;  Surgeon: Rey Barry M.D.;  Location: SURGERY [facility];  Service: Urology    • URETEROSCOPY Left 2/12/2018    Procedure: URETEROSCOPY- FLEXIBLE  ;  Surgeon: Rey Barry M.D.;  Location: SURGERY [facility];  Service: Urology   • LASERTRIPSY Left 2/12/2018    Procedure: LASERTRIPSY - LITHO  ;  Surgeon: Rey Barry M.D.;  Location: SURGERY [facility];  Service: Urology   • WOUND CLOSURE GENERAL  4/3/2012    Performed by GERHARD GILBERT at SURGERY SAME DAY [facility] ORS   • ZZZ CARDIAC CATH  2009    Stents to LAD, Om   • DAGOBERTO BY LAPAROSCOPY  1998   • ANGIOPLASTY  1997    RCA followed by other stents as noted above.    • ZZZ CARDIAC CATH  1997    stent RCA   • CATARACT EXTRACTION WITH IOL      bilateral   • LITHOTRIPSY     • TONSILLECTOMY AND ADENOIDECTOMY          MEDICATIONS:   Current Outpatient Prescriptions   Medication   • fluoxetine (PROZAC) 40 MG capsule   • diclofenac (SOLARAZE) 3 % gel   • Insulin Pen Needle 32 G x 4 mm (BD PEN NEEDLE SWATI U/F)   • magnesium oxide (MAGNESIUM-OXIDE) 400 (241.3 Mg) MG Tab tablet   • potassium chloride (KLOR-CON) 8 MEQ tablet   • losartan (COZAAR) 25 MG Tab   • TRULICITY 1.5 MG/0.5ML Solution Pen-injector   • metoprolol (TOPROL-XL) 200 MG XL tablet   • ONE TOUCH ULTRA TEST strip   • DAILY MULTIPLE VITAMINS PO   • ONE TOUCH ULTRA TEST strip   • insulin lispro, Human, (HUMALOG KWIKPEN) 100 UNIT/ML Solution Pen-injector injection   • Insulin Glargine (TOUJEO MAX SOLOSTAR) 300 UNIT/ML Solution Pen-injector   • atorvastatin (LIPITOR) 40 MG Tab   • allopurinol (ZYLOPRIM) 300 MG Tab   • acetaminophen (TYLENOL) 500 MG Tab   • fenofibrate (TRICOR) 145 MG Tab   • clopidogrel (PLAVIX) 75 MG Tab   • ascorbic acid (VITAMIN C) 500 MG tablet   • Cholecalciferol (VITAMIN D) 2000 units Cap   • aspirin 81 MG tablet     No current facility-administered medications for this visit.        ALLERGIES:    Allergies   Allergen Reactions   • Diphenhydramine Hcl Anxiety     Pt is able to tolerate  Mg benadryl with less anxiety   • Lorazepam Unspecified      Disorientation   • Ciprofloxacin      Rash,stomach ache         SOCIAL HISTORY:   Social History     Social History   • Marital status:      Spouse name: N/A   • Number of children: N/A   • Years of education: N/A     Social History Main Topics   • Smoking status: Never Smoker   • Smokeless tobacco: Never Used   • Alcohol use No   • Drug use: No   • Sexual activity: Not Currently     Partners: Female     Other Topics Concern   • Not on file     Social History Narrative   • No narrative on file       FAMILY HISTORY:   Family History   Problem Relation Age of Onset   • Heart Disease Father         CAD   • Diabetes Father    • Cancer Mother    • Psychiatry Mother         Depression   • Depression Mother    • Kidney stones Brother    • Heart Disease Brother    • Psychiatry Brother         Depression   • Depression Brother    • Suicide Attempts Other    • Psychiatry Other         autism        REVIEW OF SYSTEMS:   Review of Systems   Constitutional: Negative for chills and fever.   Respiratory: Negative for cough and shortness of breath.    Cardiovascular: Negative for chest pain and leg swelling.   Gastrointestinal: Negative for constipation, diarrhea, nausea and vomiting.   Genitourinary: Negative for dysuria.   Musculoskeletal: Positive for joint pain.        Left hemiparesis, physical therapy twice per week  Uses cane or walker for ambulation   Neurological: Positive for sensory change.        Diminished sensation to the left side of his body due to CVA   Psychiatric/Behavioral: Negative for depression. The patient is not nervous/anxious.        PHYSICAL EXAMINATION:   /88   Pulse 70   Temp 36.7 °C (98 °F) (Temporal)   Resp 18   SpO2 94%   Physical Exam   Constitutional: He is oriented to person, place, and time and well-developed, well-nourished, and in no distress.   HENT:   Head: Normocephalic.   Eyes: Pupils are equal, round, and reactive to light.   Cardiovascular:   Right DP and PT pulses  palpable  Unable to palpate left pedal pulses   Pulmonary/Chest: Effort normal.   Musculoskeletal: Normal range of motion.   Neurological: He is alert and oriented to person, place, and time.   Skin: Skin is warm.   Necrotic wounds to left medial ankle, anterior left lower leg, and right postero-lateral lower leg  Shallow open wound to left posterior lower leg  Refer to wound flowsheet   Psychiatric: Affect normal.       Wound Assessment- Refer to wound flowsheet      Wound photos, no debridement of any wounds today.    Right anterior lower leg      Right lateral ankle    Left medial ankle    Left lateral ankle                    PROCEDURE  No debridement of wounds today due to arterial insufficiency  Wound care completed by Kenny-necrotic wounds painted with Betadine, left posterior calf wound treated with Aquacel Ag.    PATIENT EDUCATION  -Advised to go to ER for any increased redness, swelling, drainage or odor, or if patient develops fever, chills, nausea or vomiting.  -Importance of adequate nutrition for wound healing  -Increase protein intake (unless contraindicated by renal status)    ASSESSMENT AND PLAN:   1. Chronic ulcer of left lower extremity with fat layer exposed (HCC)  Full-thickness wound posterior lower leg, necrotic wounds to medial ankle and anterior lower leg, covered with stable eschar.  6/11: Wounds assessed in clinic today.  No debridement    -No need for advanced wound care services  -Patient is discharged from [facility]  -He is to return to clinic ASAP if wounds deteriorate, or if he develops new wounds.  Contact info provided    2. Chronic ulcer of right lower extremity with fat layer exposed (HCC)  Necrotic wound to posterior/lateral lower leg, covered with stable eschar  6/11: Wound assessed in clinic today.  No debridement as per Dr. Terry's instructions.  Wound treated with Betadine.  Wife to continue wound care.  -  -See above    3.  Tibial artery disease  Occlusions of tibial arteries  bilaterally.  Complicating factor, impairing wound healing potential.  Patient has been seen by vascular surgeon, not a candidate for surgery at this time.    4. (HCC) Left hemiparesis  Complicating factor.  Limited mobility.  Patient at risk for pressure injury and trauma.    5.H/O Cerebrovascular accident (CVA) in adulthood  Resulting in left hemiparesis.      Please note that this dictation was created using voice recognition software. I have worked with technical experts from [facility] to optimize the interface.  I have made every reasonable attempt to correct obvious errors, but there may be errors of grammar and possibly content that I did not discover before finalizing the note.

## 2019-06-11 NOTE — PATIENT INSTRUCTIONS
-Avoid prolonged standing or sitting without elevating your legs.    -Should you experience any significant changes in your wound(s), such as infection (redness, swelling, localized heat, increased pain, fever > 101 F, chills) or have any questions regarding your home care instructions, please contact the wound center at [phone]. If after hours, contact your primary care physician or go to the hospital emergency room.

## 2019-06-17 ENCOUNTER — OFFICE VISIT (OUTPATIENT)
Dept: NEPHROLOGY | Facility: MEDICAL CENTER | Age: 74
End: 2019-06-17
Payer: MEDICARE

## 2019-06-17 VITALS
BODY MASS INDEX: 27.09 KG/M2 | TEMPERATURE: 98 F | WEIGHT: 200 LBS | OXYGEN SATURATION: 94 % | HEIGHT: 72 IN | DIASTOLIC BLOOD PRESSURE: 70 MMHG | SYSTOLIC BLOOD PRESSURE: 122 MMHG | HEART RATE: 62 BPM | RESPIRATION RATE: 14 BRPM

## 2019-06-17 DIAGNOSIS — N18.30 CKD (CHRONIC KIDNEY DISEASE), STAGE III (HCC): ICD-10-CM

## 2019-06-17 DIAGNOSIS — N20.0 NEPHROLITHIASIS: ICD-10-CM

## 2019-06-17 DIAGNOSIS — R80.9 MICROALBUMINURIA DUE TO TYPE 2 DIABETES MELLITUS (HCC): ICD-10-CM

## 2019-06-17 DIAGNOSIS — I10 ESSENTIAL HYPERTENSION: ICD-10-CM

## 2019-06-17 DIAGNOSIS — E11.29 MICROALBUMINURIA DUE TO TYPE 2 DIABETES MELLITUS (HCC): ICD-10-CM

## 2019-06-17 PROCEDURE — 99213 OFFICE O/P EST LOW 20 MIN: CPT | Performed by: INTERNAL MEDICINE

## 2019-06-17 ASSESSMENT — ENCOUNTER SYMPTOMS
WEIGHT LOSS: 0
HEMOPTYSIS: 0
VOMITING: 0
EYES NEGATIVE: 1
SINUS PAIN: 0
DIZZINESS: 0
PALPITATIONS: 0
NAUSEA: 0
CHILLS: 0
SHORTNESS OF BREATH: 0
FEVER: 0
COUGH: 0
FLANK PAIN: 0
HEADACHES: 0
WHEEZING: 0
ORTHOPNEA: 0

## 2019-06-17 NOTE — PROGRESS NOTES
Subjective:      Av Bob is a 73 y.o. male who presents with Follow-Up and Chronic Kidney Disease            Chronic Kidney Disease   Pertinent negatives include no chest pain, chills, congestion, coughing, fever, headaches, nausea or vomiting.     Av is coming today for f/u of CKD III, microalbuminuria  Hx/of nephrolithiasis -well controlled -f/u with Urology  Baseline creatinine level at 2 - now creat better to 1.5  Diagnosed with left renal obstructing kidney stone -treated with lithotripsy  Doing well , no complaints  No difficulties to urinate.No dysuria No flank pain  HTN: BP well controlled    Review of Systems   Constitutional: Negative for chills, fever, malaise/fatigue and weight loss.   HENT: Negative for congestion, hearing loss and sinus pain.    Eyes: Negative.    Respiratory: Negative for cough, hemoptysis, shortness of breath and wheezing.    Cardiovascular: Negative for chest pain, palpitations, orthopnea and leg swelling.   Gastrointestinal: Negative for nausea and vomiting.   Genitourinary: Negative for dysuria, flank pain, frequency, hematuria and urgency.   Skin: Negative.    Neurological: Negative for dizziness and headaches.        Hx/of CVA, on wheelchair   All other systems reviewed and are negative.    Past Medical History:   Diagnosis Date   • Acute respiratory failure with hypoxia ([facility]) 5/20/2017   • CAD (coronary artery disease)     GIOVANNA to RCA in '97, GIOVANNA X2 to LAD and GIOVANNA X2 to OM in '09   • Cancer ([facility])     2017; chemo lympoma   • Cataract    • Cerebrovascular accident (CVA) ([facility]) 12/30/2016    Left arm weakness  etiology of stroke not established, lymphoma discovered on MRI evaluation of stroke, L hemiparesis much worse after acute infectious illness in mid 2017, but no specific diagnosed recurrent neurological etiology, all at [facility]   • CKD (chronic kidney disease) stage 3, GFR 30-59 ml/min ([facility])    • Controlled gout 2014   •  Coronary atherosclerosis of native coronary artery     S/P PTCA (percutaneous transluminal coronary angioplasty), RCA, 5/1997, patent on cath 7/10/2009 at the time of interventions on his left anterior descending and circumflex coronary arteries   • Depression    • Diabetes ([facility])    • Enterococcal septicemia ([facility]) 8/12/2017   • Hypertension    • Hypokalemia 2012    controlled with combination of ACE inhibitor or ARB plus spironolactone   • Hypomagnesemia 08/12/2017    etiology uncertain   • Lymphoma ([facility]) 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 Barry   • NSTEMI (non-ST elevated myocardial infarction) ([facility]) 07/18/2017    complicating UTI with sepsis   • Pain    • Polyneuropathy in diabetes(357.2) 9/11/2013   • Septic shock ([facility]) 5/20/2017   • Skin ulcer of calf ([facility]) 2015    Right, Dr. Terry and wound care   • Stroke ([facility]) 2016    left sided weakness   • Urinary bladder disorder    • Urinary incontinence    • Wound of left leg 2012    Requiring surgery and debridment, Dr. Moore       Family History   Problem Relation Age of Onset   • Heart Disease Father         CAD   • Diabetes Father    • Cancer Mother    • Psychiatry Mother         Depression   • Depression Mother    • Kidney stones Brother    • Heart Disease Brother    • Psychiatry Brother         Depression   • Depression Brother    • Suicide Attempts Other    • Psychiatry Other         autism       Social History     Social History   • Marital status:      Spouse name: N/A   • Number of children: N/A   • Years of education: N/A     Social History Main Topics   • Smoking status: Never Smoker   • Smokeless tobacco: Never Used   • Alcohol use No   • Drug use: No   • Sexual activity: Not Currently     Partners: Female     Other Topics Concern   • Not on file     Social History Narrative   • No narrative on file          Objective:     /70 (BP Location: Right arm,  Patient Position: Sitting, BP Cuff Size: Adult)   Pulse 62   Temp 36.7 °C (98 °F) (Temporal)   Resp 14   Ht 1.829 m (6')   Wt 90.7 kg (200 lb) Comment: Pt stated wt. He is in a wheelchair  SpO2 94%   BMI 27.12 kg/m²            Laboratory results reviewed:  Lab Results   Component Value Date/Time    CREATININE 1.53 (H) 05/22/2019 02:20 PM    CREATININE 1.4 05/28/2008 05:42 PM    POTASSIUM 3.7 05/22/2019 02:20 PM       Assessment/Plan:     1. CKD (chronic kidney disease), stage III      Creat improved significantly -to monitor      2. Essential hypertension, benign      BP well controlled    3. Microalbuminuria due to type 2 diabetes mellitus (CMS-HCC)      Microalb/creat ratio at 0.12 -on ARB -to monitor      4. Vitamin D deficiency      Vit D and PTH WNL -continue supplementation      5. Nephrolithiasis      s/p lithotripsy           Recs; Continue current treatment             To drink plenty of fluids              Avoid NSAID's              Monitor BP              F/u in Urology Clinic              F/u in 4 months with BMP, vit D, urine microalb/creat ra    Physical Exam   Constitutional: He is oriented to person, place, and time. He appears well-developed and well-nourished. No distress.   HENT:   Head: Normocephalic and atraumatic.   Nose: Nose normal.   Mouth/Throat: Oropharynx is clear and moist.   Eyes: Pupils are equal, round, and reactive to light. EOM are normal.   Cardiovascular: Normal rate and regular rhythm.  Exam reveals no gallop and no friction rub.    Pulmonary/Chest: Effort normal and breath sounds normal. He has no wheezes. He has no rales.   Abdominal: Soft. Bowel sounds are normal. He exhibits no distension and no mass. There is no tenderness. There is no guarding.   Musculoskeletal: He exhibits no edema.   Neurological: He is alert and oriented to person, place, and time. No cranial nerve deficit.   Skin: Skin is warm. He is not diaphoretic.   Vitals reviewed.

## 2019-06-21 ENCOUNTER — OFFICE VISIT (OUTPATIENT)
Dept: MEDICAL GROUP | Facility: MEDICAL CENTER | Age: 74
End: 2019-06-21
Payer: MEDICARE

## 2019-06-21 VITALS
TEMPERATURE: 96.8 F | OXYGEN SATURATION: 98 % | RESPIRATION RATE: 16 BRPM | BODY MASS INDEX: 27.09 KG/M2 | WEIGHT: 200 LBS | SYSTOLIC BLOOD PRESSURE: 120 MMHG | HEART RATE: 67 BPM | HEIGHT: 72 IN | DIASTOLIC BLOOD PRESSURE: 82 MMHG

## 2019-06-21 DIAGNOSIS — S76.311D STRAIN OF RIGHT HAMSTRING, SUBSEQUENT ENCOUNTER: ICD-10-CM

## 2019-06-21 DIAGNOSIS — L97.912 CHRONIC ULCER OF RIGHT LOWER EXTREMITY WITH FAT LAYER EXPOSED (HCC): ICD-10-CM

## 2019-06-21 DIAGNOSIS — M24.851 SNAPPING HIP SYNDROME, RIGHT: ICD-10-CM

## 2019-06-21 DIAGNOSIS — S76.011D MUSCLE STRAIN OF RIGHT GLUTEAL REGION, SUBSEQUENT ENCOUNTER: ICD-10-CM

## 2019-06-21 DIAGNOSIS — S76.011A STRAIN OF FLEXOR MUSCLE OF RIGHT HIP, INITIAL ENCOUNTER: ICD-10-CM

## 2019-06-21 DIAGNOSIS — Z99.3 WHEELCHAIR DEPENDENT: ICD-10-CM

## 2019-06-21 DIAGNOSIS — E11.3293 CONTROLLED TYPE 2 DIABETES MELLITUS WITH BOTH EYES AFFECTED BY MILD NONPROLIFERATIVE RETINOPATHY WITHOUT MACULAR EDEMA, WITHOUT LONG-TERM CURRENT USE OF INSULIN (HCC): ICD-10-CM

## 2019-06-21 DIAGNOSIS — N39.0 RECURRENT UTI: ICD-10-CM

## 2019-06-21 DIAGNOSIS — R29.898 LEFT HAND WEAKNESS: ICD-10-CM

## 2019-06-21 PROBLEM — L97.922 CHRONIC ULCER OF LEFT LOWER EXTREMITY WITH FAT LAYER EXPOSED (HCC): Status: RESOLVED | Noted: 2018-12-27 | Resolved: 2019-06-21

## 2019-06-21 PROCEDURE — 99214 OFFICE O/P EST MOD 30 MIN: CPT | Performed by: FAMILY MEDICINE

## 2019-06-21 NOTE — ASSESSMENT & PLAN NOTE
We discussed that patient is diagnosed with diabetes, well-controlled, given that the A1c is:   Lab Results   Component Value Date/Time    HBA1C 6.3 02/25/2019 05:35 PM        Patient is currently taking (insulin, trulicity) for glycemic control, and (losartan) for health maintenance related to diabetes mellitus.    We discussed that at present time it is better to pursue lifestyle changes rather than changing medications. Patient is in agreement.     ROS is NEGATIVE for blurred vision, polydipsia, polyuria, diaphoresis, palpitations, fatigue, irritability, flank pain, BLE paresthesias.

## 2019-06-21 NOTE — ASSESSMENT & PLAN NOTE
Chronic, uncontrolled, improving.  Patient with healing eschar of right lower extremity above lateral malleoli on right leg, with peripheral erythema, and patient and wife are instructed on local wound care.  They verbalized understanding.

## 2019-06-21 NOTE — PROGRESS NOTES
Subjective:   Chief Complaint/History of Present Illness:  Av Bob is a 74 y.o. 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    We discussed that at present time it is better to pursue lifestyle changes rather than changing medications. Patient is in agreement.     ROS is NEGATIVE for blurred vision, polydipsia, polyuria, diaphoresis, palpitations, fatigue, irritability, flank pain, BLE paresthesias.      (HCC) Chronic ulcer of right lower extremity with fat layer exposed  Chronic, uncontrolled, improving.  Patient with healing eschar of right lower extremity above lateral malleoli on right leg, with peripheral erythema, and patient and wife are instructed on local wound care.  They verbalized understanding.    Left hand weakness  Chronic, uncontrolled, improving.  Patient is working hard  strength and coordination of left hand with occupational  therapy.    Muscle strain of right gluteal region  Chronic, uncontrolled, mildly worsened compared to last visit, however he has recently started physical therapy for his right lower extremity and gait training, given that patient has left-sided hemiplegia.  Patient will continue with this, states that hip pain is mildly improved, but other pains are mildly worsens.  Patient has therefore been using his wheelchair more than previously.    We therefore discussed that patient's current pain regimen with Tylenol may be insufficient to control his pains, and that use of NSAIDs is cautioned given that he has chronic kidney disease stage III.  Thus, we discussed that he could add on muscle relaxants, and I have given him information regarding both tizanidine as well as Skelaxin, and he can let me know which when he would like to try.    Patient does have mild bladder incontinence due to his cerebrovascular accident.    ROS is NEGATIVE for BLE radicular pain, saddle paresthesias, bowel incontinence, gait instability    Snapping hip syndrome, right  Chronic, uncontrolled, please see notes from same date of service 6/21/2019 regarding muscle strain of right gluteal region    Strain of flexor muscle of right hip  Chronic, uncontrolled, please see notes from same date of service 6/21/2019 regarding muscle strain of right gluteal region    Strain of right hamstring  Chronic, uncontrolled, please see notes from same date of service 6/21/2019 regarding muscle strain of right gluteal region    Wheelchair dependent  Chronic, uncontrolled, please see notes from same date of service 6/21/2019 regarding muscle strain of right gluteal region    Recurrent UTI  Chronic, stable, well-controlled present time, patient asymptomatic.  However, I am advising him to have his urine checked for possible infection should he become more symptomatic.    ROS is NEGATIVE for fevers, chills, rigors, flank pain, dysuria, hematuria, pyuria, polyuria,  increased frequency of urination, diarrhea, constipation.      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, without long-term current use of insulin 12/30/2016     Priority: Medium   • H/O Cerebrovascular accident (CVA) in adulthood 12/30/2016     Priority: Medium   • Coronary artery disease involving native coronary artery of native heart without angina pectoris 12/30/2016     Priority: Medium   • (HCC) Hyperlipidemia associated with type 2 diabetes mellitus 12/13/2011     Priority: Medium   • GERD with esophagitis 10/19/2018     Priority: Low   • Recurrent UTI 04/26/2018     Priority: Low   • (HCC) Left hemiparesis 12/27/2017     Priority: Low   • (HCC) Diabetic nephropathy associated with type 2 diabetes mellitus 11/30/2017     Priority: Low   • Psychophysiological insomnia 11/21/2017     Priority: Low   • (HCC) Moderate episode of recurrent major depressive disorder 11/07/2017     Priority: Low   • Wheelchair dependent 11/07/2017     Priority: Low   • Normocytic hypochromic anemia 05/20/2017     Priority: Low   • H/O non-Hodgkin's lymphoma 05/08/2017     Priority: Low   • (HCC) CKD (chronic kidney disease) stage 3, GFR 30-59 ml/min 08/25/2016     Priority: Low   • Idiopathic chronic gout of multiple sites without tophus 01/28/2014     Priority: Low   • Snapping hip syndrome, right 05/20/2019   • Strain of flexor muscle of right hip 05/20/2019   • Strain of right hamstring 05/20/2019   • Muscle strain of right gluteal region 05/20/2019   • Left hand weakness 05/20/2019   • Urinary incontinence without sensory awareness 02/19/2019   • (HCC) Chronic ulcer of right lower extremity with fat layer exposed 12/27/2018   • (HCC) Tibial  artery disease 12/27/2018       Additional History:   Allergies:    Diphenhydramine hcl; Lorazepam; and Ciprofloxacin     Current Medications:     Current Outpatient Prescriptions   Medication Sig Dispense Refill   • Insulin Glargine (TOUJEO MAX SOLOSTAR) 300 UNIT/ML Solution Pen-injector Inject 40 Units as instructed every bedtime.     • insulin lispro, Human, (HUMALOG KWIKPEN) 100 UNIT/ML Solution Pen-injector injection Inject 0-5 Units as instructed every day.     • fluoxetine (PROZAC) 40 MG capsule TAKE 1 CAPSULE BY MOUTH EVERY DAY 90 Cap 2   • diclofenac (SOLARAZE) 3 % gel Apply 1 Application to affected area(s) 2 times a day. 100 g 0   • Insulin Pen Needle 32 G x 4 mm (BD PEN NEEDLE SWATI U/F) For insulin shots 5 times a day 450 Each 1   • magnesium oxide (MAGNESIUM-OXIDE) 400 (241.3 Mg) MG Tab tablet Take 1 Tab by mouth 4 times a day. 120 Tab 11   • potassium chloride (KLOR-CON) 8 MEQ tablet Take 1 Tab by mouth every day. 90 Tab 3   • losartan (COZAAR) 25 MG Tab TAKE 1 TABLET BY MOUTH TWICE DAILY 180 Tab 1   • TRULICITY 1.5 MG/0.5ML Solution Pen-injector INJECT 1.5MG AS DIRECTED EVERY 7 DAYS 6 mL 0   • metoprolol (TOPROL-XL) 200 MG XL tablet Take 1 Tab by mouth every evening. 90 Tab 3   • ONE TOUCH ULTRA TEST strip USE TO TEST UP TO 5 TIMES A DAY. 450 Strip 2   • DAILY MULTIPLE VITAMINS PO Take  by mouth.     • ONE TOUCH ULTRA TEST strip USE TO TEST UP TO FIVE TIMES DAILY 450 Strip 0   • atorvastatin (LIPITOR) 40 MG Tab TAKE 1 TABLET BY MOUTH EVERY DAY 90 Tab 2   • allopurinol (ZYLOPRIM) 300 MG Tab Take 150 mg by mouth every day.     • acetaminophen (TYLENOL) 500 MG Tab Take 1,000 mg by mouth every 6 hours as needed for Mild Pain.     • fenofibrate (TRICOR) 145 MG Tab Take 1 Tab by mouth every day. 90 Tab 3   • clopidogrel (PLAVIX) 75 MG Tab Take 1 Tab by mouth every day. 90 Tab 2   • ascorbic acid (VITAMIN C) 500 MG tablet Take 500 mg by mouth every day.     • Cholecalciferol (VITAMIN D) 2000 units Cap Take 1  Cap by mouth 2 Times a Day.     • aspirin 81 MG tablet Take 81 mg by mouth every day.       No current facility-administered medications for this visit.         Social History:     Social History   Substance Use Topics   • Smoking status: Never Smoker   • Smokeless tobacco: Never Used   • Alcohol use No       ROS:     - NOTE: All other systems reviewed and are negative, except as in HPI.     Objective:   Physical Exam:    Vitals: /82   Pulse 67   Temp 36 °C (96.8 °F) (Temporal)   Resp 16   Ht 1.829 m (6')   Wt 90.7 kg (200 lb)   SpO2 98%    BMI: Body mass index is 27.12 kg/m².   General/Constitutional: Vitals as above, Well nourished, well developed male in no acute distress   Head/Eyes: Head is grossly normal & atraumatic, bilateral conjunctivae clear and not injected, bilateral EOMI, bilateral PERRL   ENT: Bilateral external ears grossly normal in appearance, Hearing grossly intact, External nares normal in appearance and without discharge/bleeding   Respiratory: No respiratory distress, bilateral lungs are clear to ausculation in all lung fields (anterior/lateral/posterior), no wheezing/rhonchi/rales   Cardiovascular: Regular rate and rhythm without murmur/gallops/rubs, distal pulses are intact and equal bilaterally (radial, posterior tibial), no bilateral lower extremity edema   MSK: Wheelchair dependent with left-sided hemiplegia   Integumentary: Patient with 3 to 4 cm well-healing eschar with peripheral erythema, no apparent rashes   Psych: Judgment grossly appropriate, no apparent depression/anxiety    Health Maintenance:     -Not reviewed at this visit    Imaging/Labs:     - A1c reviewed and interpreted as in HPI, above    - BMP with mild chronic kidney disease stage III, Vitamin D level WNL    Assessment and Plan:   1. 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  Continue taking medication as directed.    - Insulin Glargine (TOUJEO MAX SOLOSTAR) 300 UNIT/ML Solution Pen-injector; Inject 40 Units as instructed every bedtime.   - insulin lispro, Human, (HUMALOG KWIKPEN) 100 UNIT/ML Solution Pen-injector injection; Inject 0-5 Units as instructed every day.    2. Chronic ulcer of right lower extremity with fat layer exposed (HCC)  Chronic, uncontrolled, improving.  Patient given local wound care instructions as below    3. Left hand weakness  Chronic, uncontrolled, mildly worsened compared to previous visit.  Patient to continue work with occupational therapy.    4. Muscle strain of right gluteal region, subsequent encounter  5. Snapping hip syndrome, right  6. Strain of flexor muscle of right hip, initial encounter  7. Strain of right hamstring, subsequent encounter  Chronic, uncontrolled, mildly worsened compared to last visit.  Continue work with PT    8. Wheelchair dependent  Chronic, uncontrolled, work with PT for further gait training, patient also encouraged to try to adjust his ADLs so that he is walking more often (e.g. moving things further away from himself so that he has to walk further)    9. Recurrent UTI  Chronic, stable, well-controlled.  Check urine for UTI on an as-needed basis   - URINALYSIS,CULTURE IF INDICATED; Future        RTC: In 3 months for general checkup.    PLEASE NOTE: This dictation was created using voice recognition software. I have made every reasonable attempt to correct obvious errors, but I expect that there are errors of grammar and possibly content that I did not discover before finalizing the note.

## 2019-06-21 NOTE — ASSESSMENT & PLAN NOTE
Chronic, uncontrolled, please see notes from same date of service 6/21/2019 regarding muscle strain of right gluteal region

## 2019-06-21 NOTE — ASSESSMENT & PLAN NOTE
Chronic, uncontrolled, improving.  Patient is working hard  strength and coordination of left hand with occupational therapy.

## 2019-06-21 NOTE — ASSESSMENT & PLAN NOTE
Chronic, uncontrolled, mildly worsened compared to last visit, however he has recently started physical therapy for his right lower extremity and gait training, given that patient has left-sided hemiplegia.  Patient will continue with this, states that hip pain is mildly improved, but other pains are mildly worsens.  Patient has therefore been using his wheelchair more than previously.    We therefore discussed that patient's current pain regimen with Tylenol may be insufficient to control his pains, and that use of NSAIDs is cautioned given that he has chronic kidney disease stage III.  Thus, we discussed that he could add on muscle relaxants, and I have given him information regarding both tizanidine as well as Skelaxin, and he can let me know which when he would like to try.    Patient does have mild bladder incontinence due to his cerebrovascular accident.    ROS is NEGATIVE for BLE radicular pain, saddle paresthesias, bowel incontinence, gait instability

## 2019-06-21 NOTE — ASSESSMENT & PLAN NOTE
Chronic, stable, well-controlled present time, patient asymptomatic.  However, I am advising him to have his urine checked for possible infection should he become more symptomatic.    ROS is NEGATIVE for fevers, chills, rigors, flank pain, dysuria, hematuria, pyuria, polyuria, increased frequency of urination, diarrhea, constipation.

## 2019-06-29 ENCOUNTER — HOSPITAL ENCOUNTER (OUTPATIENT)
Dept: LAB | Facility: MEDICAL CENTER | Age: 74
End: 2019-06-29
Attending: FAMILY MEDICINE
Payer: MEDICARE

## 2019-06-29 DIAGNOSIS — N39.0 RECURRENT UTI: ICD-10-CM

## 2019-06-29 LAB
APPEARANCE UR: ABNORMAL
BACTERIA #/AREA URNS HPF: ABNORMAL /HPF
BILIRUB UR QL STRIP.AUTO: NEGATIVE
COLOR UR: YELLOW
EPI CELLS #/AREA URNS HPF: ABNORMAL /HPF
GLUCOSE UR STRIP.AUTO-MCNC: NEGATIVE MG/DL
KETONES UR STRIP.AUTO-MCNC: NEGATIVE MG/DL
LEUKOCYTE ESTERASE UR QL STRIP.AUTO: ABNORMAL
MICRO URNS: ABNORMAL
NITRITE UR QL STRIP.AUTO: POSITIVE
PH UR STRIP.AUTO: 7 [PH]
PROT UR QL STRIP: 100 MG/DL
RBC # URNS HPF: ABNORMAL /HPF
RBC UR QL AUTO: ABNORMAL
SP GR UR STRIP.AUTO: 1.01
UROBILINOGEN UR STRIP.AUTO-MCNC: 0.2 MG/DL
WBC #/AREA URNS HPF: ABNORMAL /HPF

## 2019-06-29 PROCEDURE — 87077 CULTURE AEROBIC IDENTIFY: CPT

## 2019-06-29 PROCEDURE — 81001 URINALYSIS AUTO W/SCOPE: CPT

## 2019-06-29 PROCEDURE — 87186 SC STD MICRODIL/AGAR DIL: CPT

## 2019-06-29 PROCEDURE — 87086 URINE CULTURE/COLONY COUNT: CPT

## 2019-06-30 ENCOUNTER — HOSPITAL ENCOUNTER (OUTPATIENT)
Facility: MEDICAL CENTER | Age: 74
End: 2019-07-01
Attending: EMERGENCY MEDICINE | Admitting: HOSPITALIST
Payer: MEDICARE

## 2019-06-30 ENCOUNTER — PATIENT MESSAGE (OUTPATIENT)
Dept: MEDICAL GROUP | Facility: MEDICAL CENTER | Age: 74
End: 2019-06-30

## 2019-06-30 DIAGNOSIS — N39.0 ACUTE UTI: ICD-10-CM

## 2019-06-30 DIAGNOSIS — N30.01 ACUTE CYSTITIS WITH HEMATURIA: ICD-10-CM

## 2019-06-30 PROBLEM — N30.00 ACUTE CYSTITIS WITHOUT HEMATURIA: Status: ACTIVE | Noted: 2019-06-30

## 2019-06-30 LAB
ALBUMIN SERPL BCP-MCNC: 3 G/DL (ref 3.2–4.9)
ALBUMIN/GLOB SERPL: 1.1 G/DL
ALP SERPL-CCNC: 67 U/L (ref 30–99)
ALT SERPL-CCNC: 20 U/L (ref 2–50)
ANION GAP SERPL CALC-SCNC: 14 MMOL/L (ref 0–11.9)
AST SERPL-CCNC: 24 U/L (ref 12–45)
BASOPHILS # BLD AUTO: 0.4 % (ref 0–1.8)
BASOPHILS # BLD: 0.04 K/UL (ref 0–0.12)
BILIRUB SERPL-MCNC: 1 MG/DL (ref 0.1–1.5)
BUN SERPL-MCNC: 27 MG/DL (ref 8–22)
CALCIUM SERPL-MCNC: 8.9 MG/DL (ref 8.4–10.2)
CHLORIDE SERPL-SCNC: 98 MMOL/L (ref 96–112)
CO2 SERPL-SCNC: 26 MMOL/L (ref 20–33)
CREAT SERPL-MCNC: 1.98 MG/DL (ref 0.5–1.4)
EOSINOPHIL # BLD AUTO: 0.13 K/UL (ref 0–0.51)
EOSINOPHIL NFR BLD: 1.4 % (ref 0–6.9)
ERYTHROCYTE [DISTWIDTH] IN BLOOD BY AUTOMATED COUNT: 43.5 FL (ref 35.9–50)
GLOBULIN SER CALC-MCNC: 2.7 G/DL (ref 1.9–3.5)
GLUCOSE BLD-MCNC: 126 MG/DL (ref 65–99)
GLUCOSE SERPL-MCNC: 178 MG/DL (ref 65–99)
HCT VFR BLD AUTO: 44.6 % (ref 42–52)
HGB BLD-MCNC: 14.6 G/DL (ref 14–18)
IMM GRANULOCYTES # BLD AUTO: 0.06 K/UL (ref 0–0.11)
IMM GRANULOCYTES NFR BLD AUTO: 0.6 % (ref 0–0.9)
LACTATE BLD-SCNC: 1.9 MMOL/L (ref 0.5–2)
LIPASE SERPL-CCNC: 37 U/L (ref 7–58)
LYMPHOCYTES # BLD AUTO: 0.49 K/UL (ref 1–4.8)
LYMPHOCYTES NFR BLD: 5.3 % (ref 22–41)
MCH RBC QN AUTO: 28.5 PG (ref 27–33)
MCHC RBC AUTO-ENTMCNC: 32.7 G/DL (ref 33.7–35.3)
MCV RBC AUTO: 87.1 FL (ref 81.4–97.8)
MONOCYTES # BLD AUTO: 0.74 K/UL (ref 0–0.85)
MONOCYTES NFR BLD AUTO: 8 % (ref 0–13.4)
NEUTROPHILS # BLD AUTO: 7.79 K/UL (ref 1.82–7.42)
NEUTROPHILS NFR BLD: 84.3 % (ref 44–72)
NRBC # BLD AUTO: 0 K/UL
NRBC BLD-RTO: 0 /100 WBC
PLATELET # BLD AUTO: 292 K/UL (ref 164–446)
PMV BLD AUTO: 9.9 FL (ref 9–12.9)
POTASSIUM SERPL-SCNC: 3.8 MMOL/L (ref 3.6–5.5)
PROT SERPL-MCNC: 5.7 G/DL (ref 6–8.2)
RBC # BLD AUTO: 5.12 M/UL (ref 4.7–6.1)
SODIUM SERPL-SCNC: 138 MMOL/L (ref 135–145)
WBC # BLD AUTO: 9.3 K/UL (ref 4.8–10.8)

## 2019-06-30 PROCEDURE — 700111 HCHG RX REV CODE 636 W/ 250 OVERRIDE (IP): Performed by: EMERGENCY MEDICINE

## 2019-06-30 PROCEDURE — 80053 COMPREHEN METABOLIC PANEL: CPT

## 2019-06-30 PROCEDURE — G0378 HOSPITAL OBSERVATION PER HR: HCPCS

## 2019-06-30 PROCEDURE — 96372 THER/PROPH/DIAG INJ SC/IM: CPT | Mod: XU

## 2019-06-30 PROCEDURE — 36415 COLL VENOUS BLD VENIPUNCTURE: CPT

## 2019-06-30 PROCEDURE — 87040 BLOOD CULTURE FOR BACTERIA: CPT | Mod: 91

## 2019-06-30 PROCEDURE — 82962 GLUCOSE BLOOD TEST: CPT

## 2019-06-30 PROCEDURE — 99285 EMERGENCY DEPT VISIT HI MDM: CPT

## 2019-06-30 PROCEDURE — 83690 ASSAY OF LIPASE: CPT

## 2019-06-30 PROCEDURE — A9270 NON-COVERED ITEM OR SERVICE: HCPCS | Performed by: HOSPITALIST

## 2019-06-30 PROCEDURE — 700102 HCHG RX REV CODE 250 W/ 637 OVERRIDE(OP): Performed by: HOSPITALIST

## 2019-06-30 PROCEDURE — 96365 THER/PROPH/DIAG IV INF INIT: CPT

## 2019-06-30 PROCEDURE — 83605 ASSAY OF LACTIC ACID: CPT

## 2019-06-30 PROCEDURE — 700105 HCHG RX REV CODE 258: Performed by: EMERGENCY MEDICINE

## 2019-06-30 PROCEDURE — 96375 TX/PRO/DX INJ NEW DRUG ADDON: CPT

## 2019-06-30 PROCEDURE — 99218 PR INITIAL OBSERVATION CARE,LEVL I: CPT | Performed by: HOSPITALIST

## 2019-06-30 PROCEDURE — 85025 COMPLETE CBC W/AUTO DIFF WBC: CPT

## 2019-06-30 PROCEDURE — 700105 HCHG RX REV CODE 258: Performed by: HOSPITALIST

## 2019-06-30 RX ORDER — ONDANSETRON 4 MG/1
4 TABLET, ORALLY DISINTEGRATING ORAL EVERY 4 HOURS PRN
Status: DISCONTINUED | OUTPATIENT
Start: 2019-06-30 | End: 2019-07-01 | Stop reason: HOSPADM

## 2019-06-30 RX ORDER — INSULIN GLARGINE 100 [IU]/ML
32 INJECTION, SOLUTION SUBCUTANEOUS
Status: DISCONTINUED | OUTPATIENT
Start: 2019-06-30 | End: 2019-07-01 | Stop reason: HOSPADM

## 2019-06-30 RX ORDER — HEPARIN SODIUM 5000 [USP'U]/ML
5000 INJECTION, SOLUTION INTRAVENOUS; SUBCUTANEOUS EVERY 8 HOURS
Status: DISCONTINUED | OUTPATIENT
Start: 2019-07-01 | End: 2019-07-01 | Stop reason: HOSPADM

## 2019-06-30 RX ORDER — LOSARTAN POTASSIUM 25 MG/1
25 TABLET ORAL
Status: DISCONTINUED | OUTPATIENT
Start: 2019-07-01 | End: 2019-07-01

## 2019-06-30 RX ORDER — ENALAPRILAT 1.25 MG/ML
1.25 INJECTION INTRAVENOUS EVERY 6 HOURS PRN
Status: DISCONTINUED | OUTPATIENT
Start: 2019-06-30 | End: 2019-07-01 | Stop reason: HOSPADM

## 2019-06-30 RX ORDER — BISACODYL 10 MG
10 SUPPOSITORY, RECTAL RECTAL
Status: DISCONTINUED | OUTPATIENT
Start: 2019-06-30 | End: 2019-07-01 | Stop reason: HOSPADM

## 2019-06-30 RX ORDER — ACETAMINOPHEN 325 MG/1
650 TABLET ORAL EVERY 6 HOURS PRN
Status: DISCONTINUED | OUTPATIENT
Start: 2019-06-30 | End: 2019-07-01 | Stop reason: HOSPADM

## 2019-06-30 RX ORDER — POLYETHYLENE GLYCOL 3350 17 G/17G
1 POWDER, FOR SOLUTION ORAL
Status: DISCONTINUED | OUTPATIENT
Start: 2019-06-30 | End: 2019-07-01 | Stop reason: HOSPADM

## 2019-06-30 RX ORDER — ASCORBIC ACID 500 MG
500 TABLET ORAL DAILY
Status: DISCONTINUED | OUTPATIENT
Start: 2019-07-01 | End: 2019-07-01 | Stop reason: HOSPADM

## 2019-06-30 RX ORDER — ONDANSETRON 2 MG/ML
4 INJECTION INTRAMUSCULAR; INTRAVENOUS ONCE
Status: COMPLETED | OUTPATIENT
Start: 2019-06-30 | End: 2019-06-30

## 2019-06-30 RX ORDER — FLUOXETINE HYDROCHLORIDE 20 MG/1
40 CAPSULE ORAL DAILY
Status: DISCONTINUED | OUTPATIENT
Start: 2019-07-01 | End: 2019-07-01 | Stop reason: HOSPADM

## 2019-06-30 RX ORDER — FENOFIBRATE 67 MG/1
145 CAPSULE ORAL
Status: DISCONTINUED | OUTPATIENT
Start: 2019-06-30 | End: 2019-07-01

## 2019-06-30 RX ORDER — ONDANSETRON 2 MG/ML
4 INJECTION INTRAMUSCULAR; INTRAVENOUS EVERY 4 HOURS PRN
Status: DISCONTINUED | OUTPATIENT
Start: 2019-06-30 | End: 2019-07-01 | Stop reason: HOSPADM

## 2019-06-30 RX ORDER — ALLOPURINOL 300 MG/1
150 TABLET ORAL
Status: DISCONTINUED | OUTPATIENT
Start: 2019-06-30 | End: 2019-07-01 | Stop reason: HOSPADM

## 2019-06-30 RX ORDER — LABETALOL HYDROCHLORIDE 5 MG/ML
10 INJECTION, SOLUTION INTRAVENOUS EVERY 4 HOURS PRN
Status: DISCONTINUED | OUTPATIENT
Start: 2019-06-30 | End: 2019-07-01 | Stop reason: HOSPADM

## 2019-06-30 RX ORDER — METOPROLOL SUCCINATE 100 MG/1
200 TABLET, EXTENDED RELEASE ORAL EVERY EVENING
Status: DISCONTINUED | OUTPATIENT
Start: 2019-06-30 | End: 2019-07-01 | Stop reason: HOSPADM

## 2019-06-30 RX ORDER — ATORVASTATIN CALCIUM 40 MG/1
40 TABLET, FILM COATED ORAL
Status: DISCONTINUED | OUTPATIENT
Start: 2019-06-30 | End: 2019-07-01 | Stop reason: HOSPADM

## 2019-06-30 RX ORDER — AMOXICILLIN 250 MG
2 CAPSULE ORAL 2 TIMES DAILY
Status: DISCONTINUED | OUTPATIENT
Start: 2019-06-30 | End: 2019-07-01 | Stop reason: HOSPADM

## 2019-06-30 RX ORDER — CLOPIDOGREL BISULFATE 75 MG/1
75 TABLET ORAL
Status: DISCONTINUED | OUTPATIENT
Start: 2019-06-30 | End: 2019-07-01 | Stop reason: HOSPADM

## 2019-06-30 RX ORDER — SODIUM CHLORIDE 9 MG/ML
1000 INJECTION, SOLUTION INTRAVENOUS ONCE
Status: COMPLETED | OUTPATIENT
Start: 2019-06-30 | End: 2019-06-30

## 2019-06-30 RX ORDER — POTASSIUM CHLORIDE 750 MG/1
10 TABLET, FILM COATED, EXTENDED RELEASE ORAL DAILY
Status: DISCONTINUED | OUTPATIENT
Start: 2019-07-01 | End: 2019-07-01

## 2019-06-30 RX ORDER — SODIUM CHLORIDE 9 MG/ML
INJECTION, SOLUTION INTRAVENOUS CONTINUOUS
Status: DISCONTINUED | OUTPATIENT
Start: 2019-06-30 | End: 2019-07-01

## 2019-06-30 RX ORDER — ASPIRIN 81 MG/1
81 TABLET, CHEWABLE ORAL DAILY
Status: DISCONTINUED | OUTPATIENT
Start: 2019-07-01 | End: 2019-07-01 | Stop reason: HOSPADM

## 2019-06-30 RX ADMIN — FENOFIBRATE 134 MG: 67 CAPSULE ORAL at 21:03

## 2019-06-30 RX ADMIN — ONDANSETRON 4 MG: 2 INJECTION INTRAMUSCULAR; INTRAVENOUS at 18:28

## 2019-06-30 RX ADMIN — ATORVASTATIN CALCIUM 40 MG: 40 TABLET, FILM COATED ORAL at 21:03

## 2019-06-30 RX ADMIN — SODIUM CHLORIDE 1000 ML: 9 INJECTION, SOLUTION INTRAVENOUS at 18:29

## 2019-06-30 RX ADMIN — CEFTRIAXONE 1 G: 1 INJECTION, POWDER, FOR SOLUTION INTRAMUSCULAR; INTRAVENOUS at 18:29

## 2019-06-30 RX ADMIN — CLOPIDOGREL BISULFATE 75 MG: 75 TABLET ORAL at 21:03

## 2019-06-30 RX ADMIN — ALLOPURINOL 150 MG: 300 TABLET ORAL at 21:04

## 2019-06-30 RX ADMIN — SODIUM CHLORIDE: 9 INJECTION, SOLUTION INTRAVENOUS at 20:52

## 2019-06-30 RX ADMIN — METOPROLOL SUCCINATE 200 MG: 100 TABLET, EXTENDED RELEASE ORAL at 21:04

## 2019-06-30 RX ADMIN — Medication 400 MG: at 21:03

## 2019-06-30 RX ADMIN — INSULIN GLARGINE 32 UNITS: 100 INJECTION, SOLUTION SUBCUTANEOUS at 22:50

## 2019-06-30 ASSESSMENT — ENCOUNTER SYMPTOMS
FEVER: 1
NAUSEA: 1
SHORTNESS OF BREATH: 0
WHEEZING: 0
HEADACHES: 0
CONSTIPATION: 0
WEAKNESS: 1
CHILLS: 1
COUGH: 1
DIARRHEA: 0
VOMITING: 1

## 2019-06-30 ASSESSMENT — COGNITIVE AND FUNCTIONAL STATUS - GENERAL
DRESSING REGULAR LOWER BODY CLOTHING: A LOT
HELP NEEDED FOR BATHING: A LOT
CLIMB 3 TO 5 STEPS WITH RAILING: TOTAL
SUGGESTED CMS G CODE MODIFIER MOBILITY: CL
STANDING UP FROM CHAIR USING ARMS: A LOT
EATING MEALS: A LITTLE
MOVING TO AND FROM BED TO CHAIR: A LITTLE
TURNING FROM BACK TO SIDE WHILE IN FLAT BAD: A LOT
PERSONAL GROOMING: A LITTLE
DRESSING REGULAR UPPER BODY CLOTHING: A LOT
DAILY ACTIVITIY SCORE: 14
MOBILITY SCORE: 11
SUGGESTED CMS G CODE MODIFIER DAILY ACTIVITY: CK
MOVING FROM LYING ON BACK TO SITTING ON SIDE OF FLAT BED: A LOT
TOILETING: A LOT
WALKING IN HOSPITAL ROOM: TOTAL

## 2019-06-30 ASSESSMENT — LIFESTYLE VARIABLES
EVER_SMOKED: NEVER
ALCOHOL_USE: NO

## 2019-06-30 ASSESSMENT — PATIENT HEALTH QUESTIONNAIRE - PHQ9
SUM OF ALL RESPONSES TO PHQ9 QUESTIONS 1 AND 2: 0
1. LITTLE INTEREST OR PLEASURE IN DOING THINGS: NOT AT ALL
2. FEELING DOWN, DEPRESSED, IRRITABLE, OR HOPELESS: NOT AT ALL

## 2019-07-01 VITALS
HEART RATE: 60 BPM | OXYGEN SATURATION: 96 % | RESPIRATION RATE: 18 BRPM | WEIGHT: 205.03 LBS | HEIGHT: 72 IN | BODY MASS INDEX: 27.77 KG/M2 | DIASTOLIC BLOOD PRESSURE: 76 MMHG | TEMPERATURE: 97.5 F | SYSTOLIC BLOOD PRESSURE: 147 MMHG

## 2019-07-01 PROBLEM — N30.01 ACUTE CYSTITIS WITH HEMATURIA: Status: ACTIVE | Noted: 2019-06-30

## 2019-07-01 LAB
ALBUMIN SERPL BCP-MCNC: 2.7 G/DL (ref 3.2–4.9)
BUN SERPL-MCNC: 23 MG/DL (ref 8–22)
CALCIUM SERPL-MCNC: 8.7 MG/DL (ref 8.4–10.2)
CHLORIDE SERPL-SCNC: 103 MMOL/L (ref 96–112)
CO2 SERPL-SCNC: 26 MMOL/L (ref 20–33)
CREAT SERPL-MCNC: 1.73 MG/DL (ref 0.5–1.4)
ERYTHROCYTE [DISTWIDTH] IN BLOOD BY AUTOMATED COUNT: 43.5 FL (ref 35.9–50)
GLUCOSE BLD-MCNC: 103 MG/DL (ref 65–99)
GLUCOSE BLD-MCNC: 178 MG/DL (ref 65–99)
GLUCOSE SERPL-MCNC: 74 MG/DL (ref 65–99)
HCT VFR BLD AUTO: 41 % (ref 42–52)
HGB BLD-MCNC: 13.6 G/DL (ref 14–18)
MCH RBC QN AUTO: 29.2 PG (ref 27–33)
MCHC RBC AUTO-ENTMCNC: 33.2 G/DL (ref 33.7–35.3)
MCV RBC AUTO: 88 FL (ref 81.4–97.8)
PHOSPHATE SERPL-MCNC: 2.8 MG/DL (ref 2.5–4.5)
PLATELET # BLD AUTO: 267 K/UL (ref 164–446)
PMV BLD AUTO: 10.1 FL (ref 9–12.9)
POTASSIUM SERPL-SCNC: 3.2 MMOL/L (ref 3.6–5.5)
RBC # BLD AUTO: 4.66 M/UL (ref 4.7–6.1)
SODIUM SERPL-SCNC: 141 MMOL/L (ref 135–145)
WBC # BLD AUTO: 9.3 K/UL (ref 4.8–10.8)

## 2019-07-01 PROCEDURE — 80069 RENAL FUNCTION PANEL: CPT

## 2019-07-01 PROCEDURE — 700111 HCHG RX REV CODE 636 W/ 250 OVERRIDE (IP): Performed by: HOSPITALIST

## 2019-07-01 PROCEDURE — 700105 HCHG RX REV CODE 258: Performed by: HOSPITALIST

## 2019-07-01 PROCEDURE — A9270 NON-COVERED ITEM OR SERVICE: HCPCS | Performed by: INTERNAL MEDICINE

## 2019-07-01 PROCEDURE — 82962 GLUCOSE BLOOD TEST: CPT | Mod: 91

## 2019-07-01 PROCEDURE — G0378 HOSPITAL OBSERVATION PER HR: HCPCS

## 2019-07-01 PROCEDURE — 700101 HCHG RX REV CODE 250: Performed by: INTERNAL MEDICINE

## 2019-07-01 PROCEDURE — 700102 HCHG RX REV CODE 250 W/ 637 OVERRIDE(OP): Performed by: HOSPITALIST

## 2019-07-01 PROCEDURE — 85027 COMPLETE CBC AUTOMATED: CPT

## 2019-07-01 PROCEDURE — 99217 PR OBSERVATION CARE DISCHARGE: CPT | Performed by: INTERNAL MEDICINE

## 2019-07-01 PROCEDURE — 700102 HCHG RX REV CODE 250 W/ 637 OVERRIDE(OP): Performed by: INTERNAL MEDICINE

## 2019-07-01 PROCEDURE — 96372 THER/PROPH/DIAG INJ SC/IM: CPT

## 2019-07-01 PROCEDURE — A9270 NON-COVERED ITEM OR SERVICE: HCPCS | Performed by: HOSPITALIST

## 2019-07-01 PROCEDURE — 36415 COLL VENOUS BLD VENIPUNCTURE: CPT

## 2019-07-01 RX ORDER — FENOFIBRATE 145 MG/1
145 TABLET, COATED ORAL EVERY EVENING
COMMUNITY
End: 2019-10-21

## 2019-07-01 RX ORDER — POTASSIUM CHLORIDE 20 MEQ/1
10 TABLET, EXTENDED RELEASE ORAL DAILY
Status: DISCONTINUED | OUTPATIENT
Start: 2019-07-01 | End: 2019-07-01

## 2019-07-01 RX ORDER — CLOPIDOGREL BISULFATE 75 MG/1
75 TABLET ORAL EVERY EVENING
COMMUNITY
End: 2019-10-08

## 2019-07-01 RX ORDER — CEFDINIR 300 MG/1
300 CAPSULE ORAL 2 TIMES DAILY
Qty: 14 CAP | Refills: 0 | Status: SHIPPED | OUTPATIENT
Start: 2019-07-01 | End: 2019-07-01

## 2019-07-01 RX ORDER — CEFUROXIME AXETIL 500 MG/1
500 TABLET ORAL 2 TIMES DAILY
Qty: 14 TAB | Refills: 0 | Status: SHIPPED | OUTPATIENT
Start: 2019-07-01 | End: 2019-07-02

## 2019-07-01 RX ORDER — FENOFIBRATE 67 MG/1
145 CAPSULE ORAL
Status: DISCONTINUED | OUTPATIENT
Start: 2019-07-01 | End: 2019-07-01 | Stop reason: HOSPADM

## 2019-07-01 RX ORDER — ATORVASTATIN CALCIUM 40 MG/1
40 TABLET, FILM COATED ORAL NIGHTLY
COMMUNITY
End: 2019-10-08

## 2019-07-01 RX ORDER — LOSARTAN POTASSIUM 25 MG/1
50 TABLET ORAL DAILY
Qty: 1 TAB | Refills: 0
Start: 2019-07-01 | End: 2019-10-08

## 2019-07-01 RX ORDER — SODIUM CHLORIDE AND POTASSIUM CHLORIDE 150; 900 MG/100ML; MG/100ML
INJECTION, SOLUTION INTRAVENOUS CONTINUOUS
Status: DISCONTINUED | OUTPATIENT
Start: 2019-07-01 | End: 2019-07-01 | Stop reason: HOSPADM

## 2019-07-01 RX ORDER — LOSARTAN POTASSIUM 25 MG/1
50 TABLET ORAL DAILY
Status: ON HOLD | COMMUNITY
End: 2019-07-01

## 2019-07-01 RX ADMIN — LOSARTAN POTASSIUM 25 MG: 25 TABLET ORAL at 06:43

## 2019-07-01 RX ADMIN — INSULIN HUMAN 2 UNITS: 100 INJECTION, SOLUTION PARENTERAL at 11:26

## 2019-07-01 RX ADMIN — SENNOSIDES,DOCUSATE SODIUM 2 TABLET: 8.6; 5 TABLET, FILM COATED ORAL at 06:43

## 2019-07-01 RX ADMIN — HEPARIN SODIUM 5000 UNITS: 5000 INJECTION, SOLUTION INTRAVENOUS; SUBCUTANEOUS at 06:42

## 2019-07-01 RX ADMIN — THERA TABS 1 TABLET: TAB at 06:42

## 2019-07-01 RX ADMIN — HEPARIN SODIUM 5000 UNITS: 5000 INJECTION, SOLUTION INTRAVENOUS; SUBCUTANEOUS at 13:18

## 2019-07-01 RX ADMIN — POTASSIUM CHLORIDE AND SODIUM CHLORIDE: 900; 150 INJECTION, SOLUTION INTRAVENOUS at 11:26

## 2019-07-01 RX ADMIN — SODIUM CHLORIDE: 9 INJECTION, SOLUTION INTRAVENOUS at 06:52

## 2019-07-01 RX ADMIN — Medication 400 MG: at 07:40

## 2019-07-01 RX ADMIN — Medication 400 MG: at 11:26

## 2019-07-01 RX ADMIN — FLUOXETINE 40 MG: 20 CAPSULE ORAL at 06:43

## 2019-07-01 RX ADMIN — ASPIRIN 81 MG 81 MG: 81 TABLET ORAL at 06:43

## 2019-07-01 RX ADMIN — POTASSIUM CHLORIDE 10 MEQ: 1500 TABLET, EXTENDED RELEASE ORAL at 07:40

## 2019-07-01 RX ADMIN — VITAMIN D, TAB 1000IU (100/BT) 2000 UNITS: 25 TAB at 06:43

## 2019-07-01 RX ADMIN — OXYCODONE HYDROCHLORIDE AND ACETAMINOPHEN 500 MG: 500 TABLET ORAL at 06:43

## 2019-07-01 NOTE — H&P
Hospital Medicine History & Physical Note    Date of Service  6/30/2019    Primary Care Physician  Mitchell Pantoja M.D.    Consultants  None    Code Status  Full code per patient with wife present    Chief Complaint  Malaise and weakness    History of Presenting Illness  74 y.o. male w/h/o CVA with residual deficit, depression, diabetes, hyperlipidemia who presented 6/30/2019 with malaise and weakness.  Patient has been going to  3 times per week for his previous stroke.  However for the past week he has become weaker and weaker.  He went to see his PCP because his urine has been foul-smelling.  He was found to have a UTI.  He then started vomiting at home.  He also had a fever to 100.5.  Thus his wife brought him in.    Review of Systems  Review of Systems   Constitutional: Positive for chills and fever.   Respiratory: Positive for cough. Negative for shortness of breath and wheezing.    Cardiovascular: Negative for chest pain.   Gastrointestinal: Positive for nausea and vomiting. Negative for constipation and diarrhea.   Genitourinary:        Incontinent.   Neurological: Positive for weakness. Negative for headaches.     Otherwise negative per AMA/CMS criteria    Past Medical History   has a past medical history of Acute respiratory failure with hypoxia ([facility]) (5/20/2017); CAD (coronary artery disease); Cancer ([facility]); Cataract; Cerebrovascular accident (CVA) ([facility]) (12/30/2016); CKD (chronic kidney disease) stage 3, GFR 30-59 ml/min ([facility]); Controlled gout (2014); Coronary atherosclerosis of native coronary artery; Depression; Diabetes ([facility]); Enterococcal septicemia ([facility]) (8/12/2017); Hypertension; Hypokalemia (2012); Hypomagnesemia (08/12/2017); Lymphoma ([facility]) (2/19/2017); Mixed hyperlipidemia; Nephrolithiasis (2006); NSTEMI (non-ST elevated myocardial infarction) ([facility]) (07/18/2017); Pain; Polyneuropathy in diabetes(357.2) (9/11/2013); Septic shock ([facility]) (5/20/2017); Skin ulcer of calf ([facility]) (2015); Stroke ([facility])  (2016); Urinary bladder disorder; Urinary incontinence; and Wound of left leg (2012). He also has no past medical history of Suicide attempt ([facility]).    Surgical History   has a past surgical history that includes lithotripsy; angioplasty (1997); wound closure general (4/3/2012); tonsillectomy and adenoidectomy; cataract extraction with iol; solo by laparoscopy (1998); cystoscopy stent placement (Left, 2/12/2018); ureteroscopy (Left, 2/12/2018); lasertripsy (Left, 2/12/2018); [facility]); and [facility] cardiac cath (2009).    Family History  family history includes Cancer in his mother; Depression in his brother and mother; Diabetes in his father; Heart Disease in his brother and father; Kidney stones in his brother; Psychiatry in his brother, mother, and other; Suicide Attempts in his other.    Social History   reports that he has never smoked. He has never used smokeless tobacco. He reports that he does not drink alcohol or use drugs.    Allergies  Allergies   Allergen Reactions   • Diphenhydramine Hcl Anxiety     Pt is able to tolerate  Mg benadryl with less anxiety   • Lorazepam Unspecified     Disorientation   • Ciprofloxacin      Rash,stomach ache       Medications  No current facility-administered medications on file prior to encounter.      Current Outpatient Prescriptions on File Prior to Encounter   Medication Sig Dispense Refill   • Insulin Glargine (TOUJEO MAX SOLOSTAR) 300 UNIT/ML Solution Pen-injector Inject 40 Units as instructed every bedtime.     • insulin lispro, Human, (HUMALOG KWIKPEN) 100 UNIT/ML Solution Pen-injector injection Inject 0-5 Units as instructed every day.     • fluoxetine (PROZAC) 40 MG capsule TAKE 1 CAPSULE BY MOUTH EVERY DAY 90 Cap 2   • diclofenac (SOLARAZE) 3 % gel Apply 1 Application to affected area(s) 2 times a day. 100 g 0   • Insulin Pen Needle 32 G x 4 mm (BD PEN NEEDLE SWATI U/F) For insulin shots 5 times a day 450 Each 1   • magnesium oxide (MAGNESIUM-OXIDE) 400  (241.3 Mg) MG Tab tablet Take 1 Tab by mouth 4 times a day. 120 Tab 11   • potassium chloride (KLOR-CON) 8 MEQ tablet Take 1 Tab by mouth every day. 90 Tab 3   • losartan (COZAAR) 25 MG Tab TAKE 1 TABLET BY MOUTH TWICE DAILY 180 Tab 1   • TRULICITY 1.5 MG/0.5ML Solution Pen-injector INJECT 1.5MG AS DIRECTED EVERY 7 DAYS 6 mL 0   • metoprolol (TOPROL-XL) 200 MG XL tablet Take 1 Tab by mouth every evening. 90 Tab 3   • ONE TOUCH ULTRA TEST strip USE TO TEST UP TO 5 TIMES A DAY. 450 Strip 2   • DAILY MULTIPLE VITAMINS PO Take  by mouth.     • ONE TOUCH ULTRA TEST strip USE TO TEST UP TO FIVE TIMES DAILY 450 Strip 0   • atorvastatin (LIPITOR) 40 MG Tab TAKE 1 TABLET BY MOUTH EVERY DAY 90 Tab 2   • allopurinol (ZYLOPRIM) 300 MG Tab Take 150 mg by mouth every day.     • acetaminophen (TYLENOL) 500 MG Tab Take 1,000 mg by mouth every 6 hours as needed for Mild Pain.     • fenofibrate (TRICOR) 145 MG Tab Take 1 Tab by mouth every day. 90 Tab 3   • clopidogrel (PLAVIX) 75 MG Tab Take 1 Tab by mouth every day. 90 Tab 2   • ascorbic acid (VITAMIN C) 500 MG tablet Take 500 mg by mouth every day.     • Cholecalciferol (VITAMIN D) 2000 units Cap Take 1 Cap by mouth 2 Times a Day.     • aspirin 81 MG tablet Take 81 mg by mouth every day.         Physical Exam  Weight/BMI: Body mass index is 27.81 kg/m².  BP (!) 175/84   Pulse 75   Temp 37 °C (98.6 °F) (Temporal)   Resp (!) 23   Ht 1.829 m (6')   Wt 93 kg (205 lb 0.4 oz)   SpO2 95%    Vitals:    06/30/19 1809 06/30/19 1842 06/30/19 1908 06/30/19 1909   BP:       Pulse: 78 66 72 75   Resp: 19 15 (!) 23    Temp:       TempSrc:       SpO2: 92% 94% 95% 95%   Weight:       Height:        Oxygen Therapy:  Pulse Oximetry: 95 %  Physical Exam   Constitutional: He is oriented to person, place, and time. He appears well-developed and well-nourished.   HENT:   Head: Normocephalic.   Right Ear: External ear normal.   Left Ear: External ear normal.   Nose: Nose normal.   Eyes: Pupils are  equal, round, and reactive to light. Conjunctivae and EOM are normal. Right eye exhibits no discharge. Left eye exhibits no discharge. No scleral icterus.   Neck: Neck supple. No tracheal deviation present.   Cardiovascular: Normal rate.  Exam reveals no gallop and no friction rub.    Pulmonary/Chest: No respiratory distress. He has no wheezes. He has no rales.   Abdominal: Soft. He exhibits no distension. There is no tenderness. There is no rebound and no guarding.   Musculoskeletal: He exhibits no edema.   Neurological: He is alert and oriented to person, place, and time. He exhibits abnormal muscle tone (L arm and leg weakness).   Skin: Skin is warm and dry. No rash noted. No erythema.   Psychiatric: He has a normal mood and affect.       Laboratory:   Objective   Recent Results (from the past 24 hour(s))   CBC WITH DIFFERENTIAL    Collection Time: 06/30/19  6:17 PM   Result Value Ref Range    WBC 9.3 4.8 - 10.8 K/uL    RBC 5.12 4.70 - 6.10 M/uL    Hemoglobin 14.6 14.0 - 18.0 g/dL    Hematocrit 44.6 42.0 - 52.0 %    MCV 87.1 81.4 - 97.8 fL    MCH 28.5 27.0 - 33.0 pg    MCHC 32.7 (L) 33.7 - 35.3 g/dL    RDW 43.5 35.9 - 50.0 fL    Platelet Count 292 164 - 446 K/uL    MPV 9.9 9.0 - 12.9 fL    Neutrophils-Polys 84.30 (H) 44.00 - 72.00 %    Lymphocytes 5.30 (L) 22.00 - 41.00 %    Monocytes 8.00 0.00 - 13.40 %    Eosinophils 1.40 0.00 - 6.90 %    Basophils 0.40 0.00 - 1.80 %    Immature Granulocytes 0.60 0.00 - 0.90 %    Nucleated RBC 0.00 /100 WBC    Neutrophils (Absolute) 7.79 (H) 1.82 - 7.42 K/uL    Lymphs (Absolute) 0.49 (L) 1.00 - 4.80 K/uL    Monos (Absolute) 0.74 0.00 - 0.85 K/uL    Eos (Absolute) 0.13 0.00 - 0.51 K/uL    Baso (Absolute) 0.04 0.00 - 0.12 K/uL    Immature Granulocytes (abs) 0.06 0.00 - 0.11 K/uL    NRBC (Absolute) 0.00 K/uL   COMP METABOLIC PANEL    Collection Time: 06/30/19  6:17 PM   Result Value Ref Range    Sodium 138 135 - 145 mmol/L    Potassium 3.8 3.6 - 5.5 mmol/L    Chloride 98 96 - 112  mmol/L    Co2 26 20 - 33 mmol/L    Anion Gap 14.0 (H) 0.0 - 11.9    Glucose 178 (H) 65 - 99 mg/dL    Bun 27 (H) 8 - 22 mg/dL    Creatinine 1.98 (H) 0.50 - 1.40 mg/dL    Calcium 8.9 8.4 - 10.2 mg/dL    AST(SGOT) 24 12 - 45 U/L    ALT(SGPT) 20 2 - 50 U/L    Alkaline Phosphatase 67 30 - 99 U/L    Total Bilirubin 1.0 0.1 - 1.5 mg/dL    Albumin 3.0 (L) 3.2 - 4.9 g/dL    Total Protein 5.7 (L) 6.0 - 8.2 g/dL    Globulin 2.7 1.9 - 3.5 g/dL    A-G Ratio 1.1 g/dL   LIPASE    Collection Time: 06/30/19  6:17 PM   Result Value Ref Range    Lipase 37 7 - 58 U/L   LACTIC ACID    Collection Time: 06/30/19  6:17 PM   Result Value Ref Range    Lactic Acid 1.9 0.5 - 2.0 mmol/L   ESTIMATED GFR    Collection Time: 06/30/19  6:17 PM   Result Value Ref Range    GFR If  40 (A) >60 mL/min/1.73 m 2    GFR If Non  33 (A) >60 mL/min/1.73 m 2       (click the triangle to expand results)    Imaging:  No orders to display     Assessment/Plan:  I anticipate this patient is appropriate for observation status at this time.    * Acute cystitis without hematuria- (present on admission)   Assessment & Plan    UTI  With abnormal urinalysis outpatient  Treating with ceftriaxone  Urine and blood cultures pending       ([facility]) Hypertension associated with diabetes- (present on admission)   Assessment & Plan    Continue losartan and metoprolol     H/O Cerebrovascular accident (CVA) in adulthood- (present on admission)   Assessment & Plan    With residual left-sided weakness  Goes to the [facility]w for physical therapy 3 times per week     ([facility]) 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, without long-term current use of insulin- (present on admission)   Assessment & Plan    Continue long-acting insulin along with sliding scale     ([facility]) Hyperlipidemia associated with type 2 diabetes mellitus- (present on admission)   Assessment & Plan     Continue atorvastatin     Wheelchair dependent- (present on admission)   Assessment & Plan    Brought wheelchair with him     ([facility]) CKD (chronic kidney disease) stage 3, GFR 30-59 ml/min- (present on admission)   Assessment & Plan    Creatinine slightly up elevated at 1.9 on admission above baseline of around 1.5         VTE prophylaxis:  sc heparin

## 2019-07-01 NOTE — ED TRIAGE NOTES
Chief Complaint   Patient presents with   • UTI     Wife is concerned he may have a UTI, ran a fever at home, took a urine to the lab, she is here for follow-up. Strong medical history   • Vomiting     Active vomiting in tirage.   Wife states this pt acts this same way with a UTI.

## 2019-07-01 NOTE — PROGRESS NOTES
After Visit Summary   10/29/2018    Ivan Gomez    MRN: [id number]           Patient Information     Date Of Birth          10/12/1924        Visit Information        Provider Department      10/29/2018 2:15 PM Evaristo Magaña MD [facility]        Today's Diagnoses     Preop general physical exam    -  1    Bladder tumor        Benign essential hypertension        Hyperlipidemia LDL goal <100        Femoral hernia of right side        Non-rheumatic mitral regurgitation        S/P mitral valve clip implantation        Abdominal pain, epigastric        Thrombocytopenia (H)        CKD (chronic kidney disease) stage 3, GFR 30-59 ml/min (H)        Spinal stenosis of lumbar region, unspecified whether neurogenic claudication present          Care Instructions      Before Your Surgery      Call your surgeon if there is any change in your health. This includes signs of a cold or flu (such as a sore throat, runny nose, cough, rash or fever).    Do not smoke, drink alcohol or take over the counter medicine (unless your surgeon or primary care doctor tells you to) for the 24 hours before and after surgery.    If you take prescribed drugs: Follow your doctor s orders about which medicines to take and which to stop until after surgery.    Eating and drinking prior to surgery: follow the instructions from your surgeon    Take a shower or bath the night before surgery. Use the soap your surgeon gave you to gently clean your skin. If you do not have soap from your surgeon, use your regular soap. Do not shave or scrub the surgery site.  Wear clean pajamas and have clean sheets on your bed.           Follow-ups after your visit        Your next 10 appointments already scheduled     Nov 02, 2018   Procedure with Dennis Hilton MD   [facility] (--)    [address] [facility] [address]   [phone]            Nov 08, 2018 12:40 PM CST  Pt arrived to unit via [facility] accompanied by RN and pts wife. A/O x 4, with good awareness for safety. 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 Gentle handling implemented during transfer from [facility] to bed and w/. Use of sliding board. POC discussed to both pt and wife: Safety, continue home meds, IVF, iv atb. And morning labs/ verbalized understanding. Call light use for assistance encouraged. Will continue to monitor.     (Arrive by 12:25 PM)   Return Visit with Dennis Hilton MD   [facility] ([facility])    [address]  4th Floor  [facility] [address] [phone]              Who to contact     If you have questions or need follow up information about today's clinic visit or your schedule please contact [facility] directly at [phone].  Normal or non-critical lab and imaging results will be communicated to you by MyChart, letter or phone within 4 business days after the clinic has received the results. If you do not hear from us within 7 days, please contact the clinic through [employer] or phone. If you have a critical or abnormal lab result, we will notify you by phone as soon as possible.  Submit refill requests through [employer] or call your pharmacy and they will forward the refill request to us. Please allow 3 business days for your refill to be completed.          Additional Information About Your Visit        [employer] Information     [employer] gives you secure access to your electronic health record. If you see a primary care provider, you can also send messages to your care team and make appointments. If you have questions, please call your primary care clinic.  If you do not have a primary care provider, please call [phone] and they will assist you.        Care EveryWhere ID     This is your Care EveryWhere ID. This could be used by other organizations to access your [facility] medical records  [id number]        Your Vitals Were     Pulse Temperature Pulse Oximetry BMI (Body Mass Index)          85 98  F (36.7  C) (Tympanic) 95% 23.49 kg/m2         Blood Pressure from Last 3 Encounters:   10/29/18 136/74   10/24/18 140/77   06/11/18 128/70    Weight from Last 3 Encounters:   10/29/18 150 lb (68 kg)   10/24/18 145 lb 6.4 oz (66 kg)   06/11/18 144 lb (65.3 kg)              We Performed the  Following     CBC with platelets differential     Creatinine     Electrolyte panel (Na, K, Cl, CO2, Anion gap)     Urea nitrogen        Primary Care Provider Office Phone # Fax #    Evaristo Magaña [phone] [address] [facility] [address]        Equal Access to Services     FARZANEH WILDER : Hadii aad ku hadasho Soomaali, waaxda luqadaha, qaybta kaalmada adeegyada, waxay brianin hayaan adeeg holasun ladaniela escobedo. So [facility] [phone].    ATENCIÓN: Si habla español, tiene a rubio disposición servicios gratuitos de asistencia lingüística. Llame al [phone].    We comply with applicable federal civil rights laws and [place] laws. We do not discriminate on the basis of race, color, national origin, age, disability, sex, sexual orientation, or gender identity.            Thank you!     Thank you for choosing [facility]  for your care. Our goal is always to provide you with excellent care. Hearing back from our patients is one way we can continue to improve our services. Please take a few minutes to complete the written survey that you may receive in the mail after your visit with us. Thank you!             Your Updated Medication List - Protect others around you: Learn how to safely use, store and throw away your medicines at www.disposemymeds.org.          This list is accurate as of 10/29/18  3:05 PM.  Always use your most recent med list.                   Brand Name Dispense Instructions for use Diagnosis    aspirin 81 MG EC tablet      Take 1 tablet (81 mg) by mouth daily    S/P mitral valve repair       eucerin cream      Apply topically At Bedtime Apply to hands        finasteride 5 MG tablet    PROSCAR    90 tablet    Take 1 tablet (5 mg) by mouth daily    Benign enlargement of prostate       furosemide 20 MG tablet    LASIX     Take 40 mg by mouth daily        gabapentin 300 MG capsule    NEURONTIN    270 capsule    TAKE 1 CAPSULE BY MOUTH THREE  TIMES DAILY    Spinal stenosis of lumbar region with neurogenic claudication       hydrALAZINE 25 MG tablet    APRESOLINE    270 tablet    Take 1 tablet (25 mg) by mouth 3 times daily    Acute on chronic heart failure, unspecified heart failure type (H)       HYDROcodone-acetaminophen 7.5-325 MG per tablet    NORCO    120 tablet    Take 1 tablet by mouth every 6 hours as needed for pain maximum 4 tablet(s) per day    Spinal stenosis of lumbar region, unspecified whether neurogenic claudication present       hypromellose 0.4 % Soln ophthalmic solution    ARTIFICIAL TEARS     Place 2 drops Into the left eye 4 times daily        isosorbide dinitrate 20 MG tablet    ISORDIL    270 tablet    TAKE 1 TABLET BY MOUTH THREE TIMES DAILY    Chronic diastolic heart failure (H), Coronary artery disease involving native coronary artery of native heart without angina pectoris       metoprolol succinate 50 MG 24 hr tablet    TOPROL XL    90 tablet    Take 1 tablet (50 mg) by mouth daily    Hypertension goal BP (blood pressure) < 140/90       pantoprazole 40 MG EC tablet    PROTONIX    180 tablet    Take 1 tablet (40 mg) by mouth 2 times daily    Abdominal pain, epigastric       potassium chloride SA 10 MEQ CR tablet    K-DUR/KLOR-CON M    90 tablet    Take half tablet one time daily.    Chronic diastolic heart failure (H)       simvastatin 20 MG tablet    ZOCOR    90 tablet    TAKE 1 TABLET(20 MG) BY MOUTH AT BEDTIME    Hyperlipidemia LDL goal <100       tamsulosin 0.4 MG capsule    FLOMAX    90 capsule    TAKE ONE CAPSULE BY MOUTH DAILY    Benign prostatic hyperplasia without lower urinary tract symptoms

## 2019-07-01 NOTE — DISCHARGE INSTRUCTIONS
Discharge Instructions    Discharged to home by car with relative. Discharged via wheelchair, hospital escort: Yes.  Special equipment needed: Not Applicable    Be sure to schedule a follow-up appointment with your primary care doctor or any specialists as instructed.     Discharge Plan:   Diet Plan: Discussed  Activity Level: Discussed  Confirmed Follow up Appointment: Patient to Call and Schedule Appointment  Confirmed Symptoms Management: Discussed  Medication Reconciliation Updated: Yes  Influenza Vaccine Indication: Patient Refuses, Not indicated: Previously immunized this influenza season and > 8 years of age    I understand that a diet low in cholesterol, fat, and sodium is recommended for good health. Unless I have been given specific instructions below for another diet, I accept this instruction as my diet prescription.   Other diet: diabetic diet      Urinary Tract Infection, Adult  A urinary tract infection (UTI) is an infection of any part of the urinary tract, which includes the kidneys, ureters, bladder, and urethra. These organs make, store, and get rid of urine in the body. UTI can be a bladder infection (cystitis) or kidney infection (pyelonephritis).  What are the causes?  This infection may be caused by fungi, viruses, or bacteria. Bacteria are the most common cause of UTIs. This condition can also be caused by repeated incomplete emptying of the bladder during urination.  What increases the risk?  This condition is more likely to develop if:  · You ignore your need to urinate or hold urine for long periods of time.  · You do not empty your bladder completely during urination.  · You wipe back to front after urinating or having a bowel movement, if you are female.  · You are uncircumcised, if you are male.  · You are constipated.  · You have a urinary catheter that stays in place (indwelling).  · You have a weak defense (immune) system.  · You have a medical condition that affects your bowels,  kidneys, or bladder.  · You have diabetes.  · You take antibiotic medicines frequently or for long periods of time, and the antibiotics no longer work well against certain types of infections (antibiotic resistance).  · You take medicines that irritate your urinary tract.  · You are exposed to chemicals that irritate your urinary tract.  · You are female.  What are the signs or symptoms?  Symptoms of this condition include:  · Fever.  · Frequent urination or passing small amounts of urine frequently.  · Needing to urinate urgently.  · Pain or burning with urination.  · Urine that smells bad or unusual.  · Cloudy urine.  · Pain in the lower abdomen or back.  · Trouble urinating.  · Blood in the urine.  · Vomiting or being less hungry than normal.  · Diarrhea or abdominal pain.  · Vaginal discharge, if you are female.  How is this diagnosed?  This condition is diagnosed with a medical history and physical exam. You will also need to provide a urine sample to test your urine. Other tests may be done, including:  · Blood tests.  · Sexually transmitted disease (STD) testing.  If you have had more than one UTI, a cystoscopy or imaging studies may be done to determine the cause of the infections.  How is this treated?  Treatment for this condition often includes a combination of two or more of the following:  · Antibiotic medicine.  · Other medicines to treat less common causes of UTI.  · Over-the-counter medicines to treat pain.  · Drinking enough water to stay hydrated.  Follow these instructions at home:  · Take over-the-counter and prescription medicines only as told by your health care provider.  · If you were prescribed an antibiotic, take it as told by your health care provider. Do not stop taking the antibiotic even if you start to feel better.  · Avoid alcohol, caffeine, tea, and carbonated beverages. They can irritate your bladder.  · Drink enough fluid to keep your urine clear or pale yellow.  · Keep all  follow-up visits as told by your health care provider. This is important.  · Make sure to:  ¨ Empty your bladder often and completely. Do not hold urine for long periods of time.  ¨ Empty your bladder before and after sex.  ¨ Wipe from front to back after a bowel movement if you are female. Use each tissue one time when you wipe.  Contact a health care provider if:  · You have back pain.  · You have a fever.  · You feel nauseous or vomit.  · Your symptoms do not get better after 3 days.  · Your symptoms go away and then return.  Get help right away if:  · You have severe back pain or lower abdominal pain.  · You are vomiting and cannot keep down any medicines or water.  This information is not intended to replace advice given to you by your health care provider. Make sure you discuss any questions you have with your health care provider.  Document Released: 09/27/2006 Document Revised: 05/31/2017 Document Reviewed: 11/07/2016  [employer] Patient Education © 2017 [employer]      Special Instructions: Cefuroxime tablets  What is this medicine?  CEFUROXIME (se fyoor OX eem) is a cephalosporin antibiotic. It is used to treat certain kinds of bacterial infections. It will not work for colds, flu, or other viral infections.  This medicine may be used for other purposes; ask your health care provider or pharmacist if you have questions.  COMMON BRAND NAME(S): Alti-Cefuroxime, Ceftin  What should I tell my health care provider before I take this medicine?  They need to know if you have any of these conditions:  -bleeding problems  -bowel disease, like colitis  -kidney disease  -liver disease  -an unusual or allergic reaction to cefuroxime, other antibiotics or medicines, foods, dyes or preservatives  -pregnant or trying to get pregnant  -breast-feeding  How should I use this medicine?  Take this medicine by mouth with a full glass of water. Follow the directions on the prescription label. Do not crush or chew.  This medicine works best if you take it with food. Take your medicine at regular intervals. Do not take your medicine more often than directed. Take all of your medicine as directed even if you think your are better. Do not skip doses or stop your medicine early.  Talk to your pediatrician regarding the use of this medicine in children. Special care may be needed. While this drug may be prescribed for children as young as 3 months of age for selected conditions, precautions do apply.  Overdosage: If you think you have taken too much of this medicine contact a poison control center or emergency room at once.  NOTE: This medicine is only for you. Do not share this medicine with others.  What if I miss a dose?  If you miss a dose, take it as soon as you can. If it is almost time for your next dose, take only that dose. Do not take double or extra doses.  What may interact with this medicine?  This medicine may interact with the following medications:  -antacids  -birth control pills  -certain medicines for infection like amikacin, gentamicin, tobramycin  -diuretics  -probenecid  -warfarin  This list may not describe all possible interactions. Give your health care provider a list of all the medicines, herbs, non-prescription drugs, or dietary supplements you use. Also tell them if you smoke, drink alcohol, or use illegal drugs. Some items may interact with your medicine.  What should I watch for while using this medicine?  Tell your doctor or health care professional if your symptoms do not improve or if you get new symptoms.  Do not treat diarrhea with over the counter products. Contact your doctor if you have diarrhea that lasts more than 2 days or if it is severe and watery.  This medicine can interfere with some urine glucose tests. If you use such tests, talk with your health care professional.  If you are being treated for a sexually transmitted disease, avoid sexual contact until you have finished your  treatment. Your sexual partner may also need treatment.  What side effects may I notice from receiving this medicine?  Side effects that you should report to your doctor or health care professional as soon as possible:  -allergic reactions like skin rash, itching or hives, swelling of the face, lips, or tongue  -dark urine  -difficulty breathing  -fever  -irregular heartbeat or chest pain  -redness, blistering, peeling or loosening of the skin, including inside the mouth  -seizures  -unusual bleeding or bruising  -unusually weak or tired  -white patches or sores in the mouth  Side effects that usually do not require medical attention (report to your doctor or health care professional if they continue or are bothersome):  -diarrhea  -gas or heartburn  -headache  -nausea, vomiting  -vaginal itching  This list may not describe all possible side effects. Call your doctor for medical advice about side effects. You may report side effects to FDA at 9-320-FDA-7920.  Where should I keep my medicine?  Keep out of the reach of children.  Store at room temperature between 15 and 30 degrees C (59 and 86 degrees F). Keep container tightly closed. Protect from moisture. Throw away any unused medicine after the expiration date.  NOTE: This sheet is a summary. It may not cover all possible information. If you have questions about this medicine, talk to your doctor, pharmacist, or health care provider.  © 2018 Elsevier/Gold Standard (2014-11-03 09:43:18)      · Is patient discharged on Warfarin / Coumadin?   No     Depression / Suicide Risk    As you are discharged from this [facility] facility, it is important to learn how to keep safe from harming yourself.    Recognize the warning signs:  · Abrupt changes in personality, positive or negative- including increase in energy   · Giving away possessions  · Change in eating patterns- significant weight changes-  positive or negative  · Change in sleeping patterns- unable to sleep or  sleeping all the time   · Unwillingness or inability to communicate  · Depression  · Unusual sadness, discouragement and loneliness  · Talk of wanting to die  · Neglect of personal appearance   · Rebelliousness- reckless behavior  · Withdrawal from people/activities they love  · Confusion- inability to concentrate     If you or a loved one observes any of these behaviors or has concerns about self-harm, here's what you can do:  · Talk about it- your feelings and reasons for harming yourself  · Remove any means that you might use to hurt yourself (examples: pills, rope, extension cords, firearm)  · Get professional help from the community (Mental Health, Substance Abuse, psychological counseling)  · Do not be alone:Call your Safe Contact- someone whom you trust who will be there for you.  · Call your local CRISIS HOTLINE 050-8882 or 700-764-6153  · Call your local Children's Mobile Crisis Response Team [place] (986) 540-8203 or www.DJTUNES.COM  · Call the toll free National Suicide Prevention Hotlines   · National Suicide Prevention Lifeline 165-886-QVGF (8627)  · National Hope Line Network 800-SUICIDE (749-1797)

## 2019-07-01 NOTE — PROGRESS NOTES
2 RN skin assessment done; skin not WDL. See Wound flowsheet.Presence of eschar on pts right lower leg( lateral)

## 2019-07-01 NOTE — TELEPHONE ENCOUNTER
From: Av Bob  To: Mitchell Pantoja M.D.  Sent: 6/30/2019 2:06 PM PDT  Subject: Test Result Question    Hi Dr. Pantoja,    Av had a urinalysis done yesterday (6-29). His Physical therapist suggested he might have a UTI because he has been weak, tired, cognitively out of it. On the way home from therapy Friday he vomited in the car. He doesn’t have a fever, but now there is a very strong odor to his urine.  Just wanted you to know so that if he needs antibiotics hopefully we can get them ASAP.    Thanks for your help.  Av and Usha Bob

## 2019-07-01 NOTE — PROGRESS NOTES
DSG in place to right ankle. VSS. Pt in good spirits. Eating breakfast in bed, offered chair however he declines. Denies pain or nausea. Discussed POC for the day. No signs of distress noted. Will monitor.

## 2019-07-01 NOTE — ED PROVIDER NOTES
ED Provider Note    CHIEF COMPLAINT  Chief Complaint   Patient presents with   • UTI     Wife is concerned he may have a UTI, ran a fever at home, took a urine to the lab, she is here for follow-up. Strong medical history   • Vomiting     Active vomiting in triage.       HPI  vA Bob is a 74 y.o. male who presents with generalized weakness.  Wife states the patient had some increased weakness over the last week.  It is generalized.  He does have left-sided deficits from a prior stroke.  Patient had a urinalysis sent to the lab over the weekend as the patient started having some pain with urination and became febrile today.  Today he also developed vomiting.  Mom states this is typical for the patient when he has a urinary tract infection.  He denies back pain.  He also denies abdominal pain.  He does have continued nausea.  He is also an insulin-dependent diabetic.    REVIEW OF SYSTEMS  See HPI for further details. All other systems are negative.     PAST MEDICAL HISTORY  Past Medical History:   Diagnosis Date   • Enterococcal septicemia ([facility]) 8/12/2017   • Hypomagnesemia 08/12/2017    etiology uncertain   • NSTEMI (non-ST elevated myocardial infarction) ([facility]) 07/18/2017    complicating UTI with sepsis   • Acute respiratory failure with hypoxia ([facility]) 5/20/2017   • Septic shock ([facility]) 5/20/2017   • Lymphoma ([facility]) 2/19/2017    Large cell   • Cerebrovascular accident (CVA) ([facility]) 12/30/2016    Left arm weakness  etiology of stroke not established, lymphoma discovered on MRI evaluation of stroke, L hemiparesis much worse after acute infectious illness in mid 2017, but no specific diagnosed recurrent neurological etiology, all at [facility]   • Stroke ([facility]) 2016    left sided weakness   • Skin ulcer of calf ([facility]) 2015    Right, Dr. Terry and wound care   • Controlled gout 2014   • Polyneuropathy in diabetes(357.2) 9/11/2013   • Hypokalemia 2012    controlled with  combination of ACE inhibitor or ARB plus spironolactone   • Wound of left leg 2012    Requiring surgery and debridment, Dr. Moore   • Nephrolithiasis 2006    right kidney subsequent lithotripsy by Dr. Barry   • CAD (coronary artery disease)     GIOVANNA to RCA in '97, GIOVANNA X2 to LAD and GIOVANNA X2 to OM in '09   • Cancer ([facility])     2017; chemo lympoma   • Cataract    • CKD (chronic kidney disease) stage 3, GFR 30-59 ml/min ([facility])    • Coronary atherosclerosis of native coronary artery     S/P PTCA (percutaneous transluminal coronary angioplasty), RCA, 5/1997, patent on cath 7/10/2009 at the time of interventions on his left anterior descending and circumflex coronary arteries   • Depression    • Diabetes ([facility])    • Hypertension    • Mixed hyperlipidemia    • Pain    • Urinary bladder disorder    • Urinary incontinence        FAMILY HISTORY  [unfilled]    SOCIAL HISTORY  Social History     Social History   • Marital status:      Spouse name: N/A   • Number of children: N/A   • Years of education: N/A     Social History Main Topics   • Smoking status: Never Smoker   • Smokeless tobacco: Never Used   • Alcohol use No   • Drug use: No   • Sexual activity: Not Currently     Partners: Female     Other Topics Concern   • Not on file     Social History Narrative   • No narrative on file       SURGICAL HISTORY  Past Surgical History:   Procedure Laterality Date   • CYSTOSCOPY STENT PLACEMENT Left 2/12/2018    Procedure: CYSTOSCOPY  ;  Surgeon: Rey Barry M.D.;  Location: SURGERY [facility];  Service: Urology   • URETEROSCOPY Left 2/12/2018    Procedure: URETEROSCOPY- FLEXIBLE  ;  Surgeon: Rey Barry M.D.;  Location: SURGERY [facility];  Service: Urology   • LASERTRIPSY Left 2/12/2018    Procedure: LASERTRIPSY - LITHO  ;  Surgeon: Rey Barry M.D.;  Location: SURGERY [facility];  Service: Urology   • WOUND CLOSURE GENERAL  4/3/2012    Performed by GERHARD MOORE at SURGERY SAME DAY [facility]   • [facility]  CARDIAC CATH  2009    Stents to LAD, Om   • DAGOBERTO BY LAPAROSCOPY  1998   • ANGIOPLASTY  1997    RCA followed by other stents as noted above.    • LETITIA CARDIAC CATH  1997    stent RCA   • CATARACT EXTRACTION WITH IOL      bilateral   • LITHOTRIPSY     • TONSILLECTOMY AND ADENOIDECTOMY         CURRENT MEDICATIONS  Home Medications    **Home medications have not yet been reviewed for this encounter**         ALLERGIES  Allergies   Allergen Reactions   • Diphenhydramine Hcl Anxiety     Pt is able to tolerate  Mg benadryl with less anxiety   • Lorazepam Unspecified     Disorientation   • Ciprofloxacin      Rash,stomach ache       PHYSICAL EXAM  VITAL SIGNS: BP (!) 175/84   Pulse 77   Temp 37 °C (98.6 °F) (Temporal)   Resp 17   Ht 1.829 m (6')   Wt 93 kg (205 lb 0.4 oz)   SpO2 93%   BMI 27.81 kg/m²  Room air O2: 93    Constitutional: Ill in appearance  HENT: Normocephalic, Atraumatic, Bilateral external ears normal, Oropharynx dry, No oral exudates, Nose normal.   Eyes: PERRLA, EOMI, Conjunctiva normal, No discharge.   Neck: Normal range of motion, No tenderness, Supple, No stridor.   Lymphatic: No lymphadenopathy noted.   Cardiovascular: Normal heart rate, Normal rhythm, No murmurs, No rubs, No gallops.   Thorax & Lungs: Normal breath sounds, No respiratory distress, No wheezing, No chest tenderness.   Abdomen: Bowel sounds normal, Soft, No tenderness, No masses, No pulsatile masses.   Skin: Warm, Dry, No erythema, No rash.   Back: No tenderness, No CVA tenderness.   Extremities: Intact distal pulses, No edema, No tenderness, No cyanosis, No clubbing.   Musculoskeletal: Good range of motion in all major joints. No tenderness to palpation or major deformities noted.   Neurologic: Alert and at his baseline according to his wife.   Psychiatric: Affect normal, Judgment normal, Mood normal.       COURSE & MEDICAL DECISION MAKING  Pertinent Labs & Imaging studies reviewed. (See chart for details)  This a 74-year-old  gentleman who presents the emergency department with fever, vomiting, and urinalysis that is support of a urinary tract infection.  Based on this suspect the patient does have early pyelonephritis.  His lactic acid is comforting is is not elevated.  He did receive IV fluids as well as Rocephin for antibiotic coverage.  Will admit the patient for observation for 24 hours to make sure he is not bacteremic.  The patient does have a slight elevation in his BUN and creatinine from baseline I suspect this is from the infection as well as dehydration.  On repeat exam after the liter of fluid he does feel better and his vital signs are currently stable.    FINAL IMPRESSION  1.  Urinary tract infection suspect early pyelonephritis  2.  Dehydration  3.  Rule out bacteremia    Disposition  The patient will be admitted in stable condition         Electronically signed by: Jamie Bruce, 6/30/2019 5:58 PM

## 2019-07-01 NOTE — PROGRESS NOTES
Med rec updated and complete  Allergies reviewed  Interviewed pt with wife at bedside with permission from pt.  Pts wife had a list of medications, went over list of medications and returned list of medications back to pts wife.  Pts wife reports no antibiotics in the last 2 weeks

## 2019-07-01 NOTE — PROGRESS NOTES
Pt discharged into care of spouse. Discussed discharge meds, activity and follow up. Pt states understanding and asks no further questions. Assisted to dress by spouse. Escorted to private transportation in his own WC.

## 2019-07-01 NOTE — ASSESSMENT & PLAN NOTE
Seems to have chronic pyuria, Cx in April negative but last fall had staph hominis (not covered by Rocephin)  Continue rocephin for now, await Cx

## 2019-07-01 NOTE — ED NOTES
Assessment complete. IV started. Labs drawn and sent. Both sets of blood cx's drawn and sent. Call light within reach

## 2019-07-01 NOTE — ASSESSMENT & PLAN NOTE
With residual left-sided weakness  [facility]

## 2019-07-02 ENCOUNTER — OFFICE VISIT (OUTPATIENT)
Dept: MEDICAL GROUP | Facility: MEDICAL CENTER | Age: 74
End: 2019-07-02
Payer: MEDICARE

## 2019-07-02 VITALS
OXYGEN SATURATION: 96 % | DIASTOLIC BLOOD PRESSURE: 76 MMHG | HEART RATE: 70 BPM | SYSTOLIC BLOOD PRESSURE: 118 MMHG | HEIGHT: 72 IN | TEMPERATURE: 97.8 F | BODY MASS INDEX: 27.77 KG/M2 | WEIGHT: 205 LBS | RESPIRATION RATE: 16 BRPM

## 2019-07-02 DIAGNOSIS — N30.01 ACUTE CYSTITIS WITH HEMATURIA: ICD-10-CM

## 2019-07-02 DIAGNOSIS — R19.7 DIARRHEA, UNSPECIFIED TYPE: ICD-10-CM

## 2019-07-02 PROCEDURE — 99214 OFFICE O/P EST MOD 30 MIN: CPT | Performed by: PHYSICIAN ASSISTANT

## 2019-07-02 RX ORDER — SULFAMETHOXAZOLE AND TRIMETHOPRIM 800; 160 MG/1; MG/1
1 TABLET ORAL 2 TIMES DAILY
Qty: 14 TAB | Refills: 0 | Status: SHIPPED | OUTPATIENT
Start: 2019-07-02 | End: 2019-07-09

## 2019-07-02 NOTE — ASSESSMENT & PLAN NOTE
This is a 74-year-old male accompanied by his wife.  Chronic history of recurrent UTIs.  Recently seen at the emergency room.  Treated appropriately with antibiotics for a urinary tract infection.  Urine culture grew Citrobacter freundii.  As an outpatient he was provided Ceftin.  Wife states that this morning after 1 dose of medication he had a bout of diarrhea.  Loose watery stool.  Has not had another episode.  Also took a nap and woke up with some facial paralysis.  States that he was unable to talk correctly.  He does have history of strokes.  His symptoms did dissipate in a few minutes.  She was concerned that he was having a stroke.  They read on the brochure regarding side effects of Ceftin that possibly at the medication because of diarrhea and the stroke.  As mentioned he had one day of medication.  Denies any fevers.  His dysuria is improving.  He has a history of an allergy to ciprofloxacin.

## 2019-07-02 NOTE — PROGRESS NOTES
Subjective:   Av Bob is a 74 y.o. male here today for side effects secondary to antibiotic use provided for acute cystitis.    Acute cystitis with hematuria  This is a 74-year-old male accompanied by his wife.  Chronic history of recurrent UTIs.  Recently seen at the emergency room.  Treated appropriately with antibiotics for a urinary tract infection.  Urine culture grew Citrobacter freundii.  As an outpatient he was provided Ceftin.  Wife states that this morning after 1 dose of medication he had a bout of diarrhea.  Loose watery stool.  Has not had another episode.  Also took a nap and woke up with some facial paralysis.  States that he was unable to talk correctly.  He does have history of strokes.  His symptoms did dissipate in a few minutes.  She was concerned that he was having a stroke.  They read on the brochure regarding side effects of Ceftin that possibly at the medication because of diarrhea and the stroke.  As mentioned he had one day of medication.  Denies any fevers.  His dysuria is improving.  He has a history of an allergy to ciprofloxacin.       Current medicines (including changes today)  Current Outpatient Prescriptions   Medication Sig Dispense Refill   • sulfamethoxazole-trimethoprim (BACTRIM DS) 800-160 MG tablet Take 1 Tab by mouth 2 times a day for 7 days. 14 Tab 0   • atorvastatin (LIPITOR) 40 MG Tab Take 40 mg by mouth every evening.     • clopidogrel (PLAVIX) 75 MG Tab Take 75 mg by mouth every evening.     • diclofenac (SOLARAZE) 3 % gel Apply 1 Application to affected area(s) as needed (Apply's on lower back and hip).     • fenofibrate (TRICOR) 145 MG Tab Take 145 mg by mouth every evening.     • magnesium oxide (MAG-OX) 400 (241.3 Mg) MG Tab tablet Take 800 mg by mouth 2 times a day.     • Dulaglutide (TRULICITY) 1.5 MG/0.5ML Solution Pen-injector Inject 1.5 mg as instructed every 7 days. On Tue     • losartan (COZAAR) 25 MG Tab Take 2 Tabs by mouth every day. DO NOT  RESUME UNTIL Wednesday July 3 1 Tab 0   • Insulin Glargine (TOUJEO MAX SOLOSTAR) 300 UNIT/ML Solution Pen-injector Inject 40 Units as instructed every bedtime.     • insulin lispro, Human, (HUMALOG KWIKPEN) 100 UNIT/ML Solution Pen-injector injection Inject 5-9 Units as instructed 3 times a day before meals. Sliding Scale     • fluoxetine (PROZAC) 40 MG capsule TAKE 1 CAPSULE BY MOUTH EVERY DAY 90 Cap 2   • potassium chloride (KLOR-CON) 8 MEQ tablet Take 1 Tab by mouth every day. 90 Tab 3   • metoprolol (TOPROL-XL) 200 MG XL tablet Take 1 Tab by mouth every evening. 90 Tab 3   • DAILY MULTIPLE VITAMINS PO Take 1 Tab by mouth every day.     • allopurinol (ZYLOPRIM) 300 MG Tab Take 150 mg by mouth every evening.     • acetaminophen (TYLENOL) 500 MG Tab Take 1,000 mg by mouth every 6 hours as needed for Mild Pain.     • ascorbic acid (VITAMIN C) 500 MG tablet Take 500 mg by mouth every day.     • Cholecalciferol (VITAMIN D) 2000 units Cap Take 2,000 Units by mouth 2 Times a Day.     • aspirin 81 MG tablet Take 81 mg by mouth every day.       No current facility-administered medications for this visit.      He  has a past medical history of Acute respiratory failure with hypoxia ([facility]) (5/20/2017); CAD (coronary artery disease); Cancer ([facility]); Cataract; Cerebrovascular accident (CVA) ([facility]) (12/30/2016); CKD (chronic kidney disease) stage 3, GFR 30-59 ml/min ([facility]); Controlled gout (2014); Coronary atherosclerosis of native coronary artery; Depression; Diabetes ([facility]); Enterococcal septicemia ([facility]) (8/12/2017); Hypertension; Hypokalemia (2012); Hypomagnesemia (08/12/2017); Lymphoma ([facility]) (2/19/2017); Mixed hyperlipidemia; Nephrolithiasis (2006); NSTEMI (non-ST elevated myocardial infarction) ([facility]) (07/18/2017); Pain; Polyneuropathy in diabetes(357.2) (9/11/2013); Septic shock ([facility]) (5/20/2017); Skin ulcer of calf ([facility]) (2015); Stroke ([facility]) (2016); Urinary bladder disorder; Urinary incontinence; and Wound of left leg (2012).  He also has no past medical history of Suicide attempt (HCC).    Social History and Family History were reviewed and updated.    ROS   No chest pain, no shortness of breath, no abdominal pain and all other systems were reviewed and are negative.       Objective:     /76 (BP Location: Left arm, Patient Position: Sitting, BP Cuff Size: Adult)   Pulse 70   Temp 36.6 °C (97.8 °F) (Temporal)   Resp 16   Ht 1.829 m (6')   Wt 93 kg (205 lb)   SpO2 96%  Body mass index is 27.8 kg/m².   Physical Exam:  Constitutional: Alert, no distress.  Skin: Warm, dry, good turgor, no rashes in visible areas.  Eye: Equal, round and reactive, conjunctiva clear, lids normal.  ENMT: Lips without lesions, good dentition, oropharynx clear.  Neck: Trachea midline, no masses.   Lymph: No cervical or supraclavicular lymphadenopathy  Respiratory: Unlabored respiratory effort, lungs appear clear, no wheezes.  Cardiovascular: Regular rate and rhythm.  Psych: Alert and oriented x3, normal affect and mood.        Assessment and Plan:   The following treatment plan was discussed    1. Acute cystitis with hematuria  Acute, new onset condition.  Reviewed medical records from the ER.  Susceptibility showing sensitivity to most antibiotics.  Advised to discontinue Ceftin.  Unknown if a true side effect or allergy reaction.  Advised to make note of it for future use.  Will provide Bactrim DS 1 tablet twice a day for 7 days.  - sulfamethoxazole-trimethoprim (BACTRIM DS) 800-160 MG tablet; Take 1 Tab by mouth 2 times a day for 7 days.  Dispense: 14 Tab; Refill: 0    2. Diarrhea, unspecified type  Acute, new onset condition.  One episode.  Unknown if secondary to antibiotic use.  Discontinue current antibiotic.  Treated with Bactrim.  Continue to monitor stools.  Go to ER urgent care with any worsening concerns.  May contact me through my chart with any concerns.      Followup: Return if symptoms worsen or fail to improve.    Please note that this  dictation was created using voice recognition software. I have made every reasonable attempt to correct obvious errors, but I expect that there are errors of grammar and possibly content that I did not discover before finalizing the note.

## 2019-07-02 NOTE — DISCHARGE SUMMARY
Discharge Summary    CHIEF COMPLAINT ON ADMISSION  Chief Complaint   Patient presents with   • UTI     Wife is concerned he may have a UTI, ran a fever at home, took a urine to the lab, she is here for follow-up. Strong medical history   • Vomiting     Active vomiting in triage.       Reason for Admission  Weakness; Fever     Admission Date  6/30/2019    CODE STATUS  Prior    HPI & HOSPITAL COURSE  This is a 74 y.o. male with a past medical history of stroke, with residual left hemiparesis, type 2 diabetes, chronic kidney disease stage III- here with weakness, concern for urinary tract infection.  Laboratory studies also indicated a mild degree of acute renal failure superimposed on his chronic kidney disease.  Patient had been seen at urgent care on Saturday and had urine culture done, results pending when admitted.  Patient received Rocephin by the following morning was feeling  improved with fluids and antibiotics, I was concerned because patient had grown out staph hominis from urine culture last fall which empiric therapy to cover gram-negative rods would not address.  However preliminary results showed gram-negative rods, patient was eager for discharge.       Therefore, he is discharged in good and stable condition to home with close outpatient follow-up.      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 *      FOLLOW UP  Future Appointments  Date Time Provider Department Center   7/9/2019 10:20 AM Fran Beltran   7/16/2019 9:40 AM [facility]   9/24/2019 1:00 PM [facility]   10/8/2019 11:00 AM Osvaldo Tucker M.D. [facility] None   10/24/2019 1:00 PM Laly Jarvis M.D. [facility] [address]     Mitchell Pantoja M.D.  [address]  [address]  [facility] [address]  [phone]    In 1 week        MEDICATIONS ON DISCHARGE     Medication List      START taking these medications      Instructions   cefUROXime 500 MG Tabs  Commonly known as:  CEFTIN   Take 1 Tab by mouth 2 times a day.  Dose:  500 mg        CHANGE how you take these medications      Instructions   losartan 25 MG Tabs  What changed:  additional instructions  Commonly known as:  COZAAR   Take 2 Tabs by mouth every day. DO NOT RESUME UNTIL Wednesday July 3  Dose:  50 mg        CONTINUE taking these medications      Instructions   acetaminophen 500 MG Tabs  Commonly known as:  TYLENOL   Take 1,000 mg by mouth every 6 hours as needed for Mild Pain.  Dose:  1000 mg     allopurinol 300 MG Tabs  Commonly known as:  ZYLOPRIM   Take 150 mg by mouth every evening.  Dose:  150 mg     ascorbic acid 500 MG tablet  Commonly known as:  VITAMIN C   Take 500 mg by mouth every day.  Dose:  500 mg     aspirin 81 MG tablet   Take 81 mg by mouth every day.  Dose:  81 mg     atorvastatin 40 MG Tabs  Commonly known as:  LIPITOR   Take 40 mg by mouth every evening.  Dose:  40 mg     clopidogrel 75 MG Tabs  Commonly known as:  PLAVIX   Take 75 mg by mouth every evening.  Dose:  75 mg     DAILY MULTIPLE VITAMINS PO   Take 1 Tab by mouth every day.  Dose:  1 Tab     diclofenac 3 % gel  Commonly known as:  SOLARAZE   Apply 1 Application to affected area(s) as needed (Apply's on lower back and  hip).  Dose:  1 Application     fenofibrate 145 MG Tabs  Commonly known as:  TRICOR   Take 145 mg by mouth every evening.  Dose:  145 mg     fluoxetine 40 MG capsule  Commonly known as:  PROZAC   TAKE 1 CAPSULE BY MOUTH EVERY DAY     HUMALOG KWIKPEN 100 UNIT/ML Sopn injection  Generic drug:  insulin lispro (Human)   Doctor's comments:  **Patient requests 90 days supply**  Inject 5-9 Units as instructed 3 times a day before meals. Sliding Scale  Dose:  5-9 Units     magnesium oxide 400 (241.3 Mg) MG Tabs tablet  Commonly known as:  MAG-OX   Take 800 mg by mouth 2 times a day.  Dose:  800 mg     metoprolol 200 MG XL tablet  Commonly known as:  TOPROL-XL   Take 1 Tab by mouth every evening.  Dose:  200 mg     potassium chloride 8 MEQ tablet  Commonly known as:  KLOR-CON   Take 1 Tab by mouth every day.  Dose:  8 mEq     TOUJEO MAX SOLOSTAR 300 UNIT/ML Sopn  Generic drug:  Insulin Glargine   Inject 40 Units as instructed every bedtime.  Dose:  40 Units     TRULICITY 1.5 MG/0.5ML Sopn  Generic drug:  Dulaglutide   Inject 1.5 mg as instructed every 7 days. On Tue  Dose:  1.5 mg     Vitamin D 2000 units Caps   Take 2,000 Units by mouth 2 Times a Day.  Dose:  2000 Units            Allergies  Allergies   Allergen Reactions   • Diphenhydramine Hcl Anxiety     Pt is able to tolerate  Mg benadryl with less anxiety   • Lorazepam Unspecified     Disorientation   • Ciprofloxacin      Rash,stomach ache       DIET  Diabetic, increase fluid intake    ACTIVITY  As before    CONSULTATIONS  none    PROCEDURES  none    LABORATORY  Lab Results   Component Value Date    SODIUM 141 07/01/2019    POTASSIUM 3.2 (L) 07/01/2019    CHLORIDE 103 07/01/2019    CO2 26 07/01/2019    GLUCOSE 74 07/01/2019    BUN 23 (H) 07/01/2019    CREATININE 1.73 (H) 07/01/2019    CREATININE 1.4 05/28/2008        Lab Results   Component Value Date    WBC 9.3 07/01/2019    HEMOGLOBIN 13.6 (L) 07/01/2019    HEMATOCRIT 41.0 (L) 07/01/2019    PLATELETCT 267 07/01/2019

## 2019-07-05 LAB
BACTERIA BLD CULT: NORMAL
BACTERIA BLD CULT: NORMAL
SIGNIFICANT IND 70042: NORMAL
SIGNIFICANT IND 70042: NORMAL
SITE SITE: NORMAL
SITE SITE: NORMAL
SOURCE SOURCE: NORMAL
SOURCE SOURCE: NORMAL

## 2019-07-07 DIAGNOSIS — M10.9 CONTROLLED GOUT: ICD-10-CM

## 2019-07-08 RX ORDER — ALLOPURINOL 300 MG/1
TABLET ORAL
Refills: 0 | OUTPATIENT
Start: 2019-07-08

## 2019-07-08 NOTE — TELEPHONE ENCOUNTER
Patient's kidney function is still compromised.  Patient needs to stay off of allopurinol for now.  Patient will be notified via [email or web address].

## 2019-07-09 ENCOUNTER — TELEPHONE (OUTPATIENT)
Dept: CARDIOLOGY | Facility: MEDICAL CENTER | Age: 74
End: 2019-07-09

## 2019-07-09 ENCOUNTER — OFFICE VISIT (OUTPATIENT)
Dept: ENDOCRINOLOGY | Facility: MEDICAL CENTER | Age: 74
End: 2019-07-09
Payer: MEDICARE

## 2019-07-09 VITALS
HEIGHT: 72 IN | HEART RATE: 60 BPM | WEIGHT: 205 LBS | BODY MASS INDEX: 27.77 KG/M2 | DIASTOLIC BLOOD PRESSURE: 80 MMHG | SYSTOLIC BLOOD PRESSURE: 126 MMHG | OXYGEN SATURATION: 97 %

## 2019-07-09 DIAGNOSIS — L97.309 DIABETIC ULCER OF ANKLE (HCC): ICD-10-CM

## 2019-07-09 DIAGNOSIS — E11.65 UNCONTROLLED TYPE 2 DIABETES MELLITUS WITH HYPERGLYCEMIA (HCC): ICD-10-CM

## 2019-07-09 DIAGNOSIS — E11.3293 CONTROLLED TYPE 2 DIABETES MELLITUS WITH BOTH EYES AFFECTED BY MILD NONPROLIFERATIVE RETINOPATHY WITHOUT MACULAR EDEMA, WITHOUT LONG-TERM CURRENT USE OF INSULIN (HCC): ICD-10-CM

## 2019-07-09 DIAGNOSIS — E55.9 VITAMIN D DEFICIENCY: ICD-10-CM

## 2019-07-09 DIAGNOSIS — E78.2 MIXED HYPERLIPIDEMIA: ICD-10-CM

## 2019-07-09 DIAGNOSIS — I10 ESSENTIAL HYPERTENSION: ICD-10-CM

## 2019-07-09 DIAGNOSIS — E11.622 DIABETIC ULCER OF ANKLE (HCC): ICD-10-CM

## 2019-07-09 LAB
HBA1C MFR BLD: 6.9 % (ref 0–5.6)
INT CON NEG: ABNORMAL
INT CON POS: ABNORMAL

## 2019-07-09 PROCEDURE — 83036 HEMOGLOBIN GLYCOSYLATED A1C: CPT | Performed by: INTERNAL MEDICINE

## 2019-07-09 PROCEDURE — 99214 OFFICE O/P EST MOD 30 MIN: CPT | Performed by: INTERNAL MEDICINE

## 2019-07-09 NOTE — TELEPHONE ENCOUNTER
Received clearance request from Larry Story DMD for pt's upcoming extractions under local anesthesia (ephineprine) and General Anesthesia, they would like to ask:    1) Interruption of Anticoag and for how long before and after treatment?   2) Anesthetic restrictions, Is Epi Ok?   3) Type of antibiotic allowed/ recommended  4) Type of pain meds allowed/recommended for after surgery    To Dr Tucker, please advice. Thank You!

## 2019-07-10 DIAGNOSIS — I15.2 HYPERTENSION ASSOCIATED WITH DIABETES (HCC): ICD-10-CM

## 2019-07-10 DIAGNOSIS — E11.59 HYPERTENSION ASSOCIATED WITH DIABETES (HCC): ICD-10-CM

## 2019-07-10 DIAGNOSIS — E11.21 DIABETIC NEPHROPATHY ASSOCIATED WITH TYPE 2 DIABETES MELLITUS (HCC): ICD-10-CM

## 2019-07-10 DIAGNOSIS — N17.9 ACUTE RENAL FAILURE SUPERIMPOSED ON STAGE 3 CHRONIC KIDNEY DISEASE, UNSPECIFIED ACUTE RENAL FAILURE TYPE: ICD-10-CM

## 2019-07-10 DIAGNOSIS — N18.3 ACUTE RENAL FAILURE SUPERIMPOSED ON STAGE 3 CHRONIC KIDNEY DISEASE, UNSPECIFIED ACUTE RENAL FAILURE TYPE: ICD-10-CM

## 2019-07-10 PROBLEM — N30.01 ACUTE CYSTITIS WITH HEMATURIA: Status: RESOLVED | Noted: 2019-06-30 | Resolved: 2019-07-10

## 2019-07-11 ENCOUNTER — APPOINTMENT (RX ONLY)
Dept: URBAN - METROPOLITAN AREA CLINIC 35 | Facility: CLINIC | Age: 74
Setting detail: DERMATOLOGY
End: 2019-07-11

## 2019-07-11 DIAGNOSIS — L57.0 ACTINIC KERATOSIS: ICD-10-CM

## 2019-07-11 DIAGNOSIS — L81.4 OTHER MELANIN HYPERPIGMENTATION: ICD-10-CM

## 2019-07-11 DIAGNOSIS — Z71.89 OTHER SPECIFIED COUNSELING: ICD-10-CM

## 2019-07-11 DIAGNOSIS — Z87.2 PERSONAL HISTORY OF DISEASES OF THE SKIN AND SUBCUTANEOUS TISSUE: ICD-10-CM

## 2019-07-11 DIAGNOSIS — Z85.828 PERSONAL HISTORY OF OTHER MALIGNANT NEOPLASM OF SKIN: ICD-10-CM

## 2019-07-11 DIAGNOSIS — L20.89 OTHER ATOPIC DERMATITIS: ICD-10-CM

## 2019-07-11 DIAGNOSIS — D22 MELANOCYTIC NEVI: ICD-10-CM

## 2019-07-11 DIAGNOSIS — L82.1 OTHER SEBORRHEIC KERATOSIS: ICD-10-CM

## 2019-07-11 DIAGNOSIS — L97 NON-PRESSURE CHRONIC ULCER OF LOWER LIMB, NOT ELSEWHERE CLASSIFIED: ICD-10-CM

## 2019-07-11 PROBLEM — L20.84 INTRINSIC (ALLERGIC) ECZEMA: Status: ACTIVE | Noted: 2019-07-11

## 2019-07-11 PROBLEM — D22.4 MELANOCYTIC NEVI OF SCALP AND NECK: Status: ACTIVE | Noted: 2019-07-11

## 2019-07-11 PROBLEM — L97.819 NON-PRESSURE CHRONIC ULCER OF OTHER PART OF RIGHT LOWER LEG WITH UNSPECIFIED SEVERITY: Status: ACTIVE | Noted: 2019-07-11

## 2019-07-11 PROCEDURE — ? ADDITIONAL NOTES

## 2019-07-11 PROCEDURE — 17000 DESTRUCT PREMALG LESION: CPT

## 2019-07-11 PROCEDURE — 99213 OFFICE O/P EST LOW 20 MIN: CPT | Mod: 25

## 2019-07-11 PROCEDURE — 17003 DESTRUCT PREMALG LES 2-14: CPT

## 2019-07-11 PROCEDURE — ? COUNSELING

## 2019-07-11 PROCEDURE — ? LIQUID NITROGEN

## 2019-07-11 PROCEDURE — ? TREATMENT REGIMEN

## 2019-07-11 ASSESSMENT — LOCATION DETAILED DESCRIPTION DERM
LOCATION DETAILED: RIGHT LATERAL ANKLE
LOCATION DETAILED: RIGHT RADIAL DORSAL HAND
LOCATION DETAILED: LEFT LATERAL FRONTAL SCALP
LOCATION DETAILED: LEFT CENTRAL FRONTAL SCALP
LOCATION DETAILED: LEFT CENTRAL MANDIBULAR CHEEK
LOCATION DETAILED: RIGHT DORSAL MIDDLE METACARPOPHALANGEAL JOINT
LOCATION DETAILED: RIGHT POSTERIOR SHOULDER
LOCATION DETAILED: LEFT MID-UPPER BACK
LOCATION DETAILED: LEFT SUPERIOR PARIETAL SCALP
LOCATION DETAILED: INFERIOR THORACIC SPINE
LOCATION DETAILED: 1ST WEB SPACE RIGHT HAND
LOCATION DETAILED: RIGHT DISTAL DORSAL FOREARM
LOCATION DETAILED: LEFT DISTAL DORSAL FOREARM
LOCATION DETAILED: RIGHT LATERAL TRAPEZIAL NECK
LOCATION DETAILED: LEFT ANTIHELIX
LOCATION DETAILED: LEFT SUPERIOR HELIX
LOCATION DETAILED: RIGHT SUPERIOR MEDIAL FOREHEAD
LOCATION DETAILED: PERIUMBILICAL SKIN

## 2019-07-11 ASSESSMENT — LOCATION SIMPLE DESCRIPTION DERM
LOCATION SIMPLE: SCALP
LOCATION SIMPLE: ABDOMEN
LOCATION SIMPLE: RIGHT THUMB
LOCATION SIMPLE: RIGHT HAND
LOCATION SIMPLE: UPPER BACK
LOCATION SIMPLE: POSTERIOR NECK
LOCATION SIMPLE: LEFT FOREARM
LOCATION SIMPLE: RIGHT SHOULDER
LOCATION SIMPLE: LEFT SCALP
LOCATION SIMPLE: LEFT UPPER BACK
LOCATION SIMPLE: RIGHT FOREARM
LOCATION SIMPLE: LEFT EAR
LOCATION SIMPLE: RIGHT FOREHEAD
LOCATION SIMPLE: LEFT CHEEK
LOCATION SIMPLE: RIGHT LOWER LEG

## 2019-07-11 ASSESSMENT — LOCATION ZONE DERM
LOCATION ZONE: FACE
LOCATION ZONE: FINGER
LOCATION ZONE: LEG
LOCATION ZONE: SCALP
LOCATION ZONE: HAND
LOCATION ZONE: EAR
LOCATION ZONE: ARM
LOCATION ZONE: TRUNK
LOCATION ZONE: NECK

## 2019-07-11 NOTE — PROCEDURE: LIQUID NITROGEN
Render Note In Bullet Format When Appropriate: No
Duration Of Freeze Thaw-Cycle (Seconds): 10
Post-Care Instructions: I reviewed with the patient in detail post-care instructions. Patient is to wear sunprotection, and avoid picking at any of the treated lesions. Pt may apply Vaseline to crusted or scabbing areas.
Detail Level: Detailed
Number Of Freeze-Thaw Cycles: 1 freeze-thaw cycle
Consent: The patient's consent was obtained including but not limited to risks of crusting, scabbing, blistering, scarring, darker or lighter pigmentary change, recurrence, incomplete removal and infection.

## 2019-07-17 ENCOUNTER — TELEPHONE (OUTPATIENT)
Dept: ENDOCRINOLOGY | Facility: MEDICAL CENTER | Age: 74
End: 2019-07-17

## 2019-07-17 DIAGNOSIS — E11.65 UNCONTROLLED TYPE 2 DIABETES MELLITUS WITH HYPERGLYCEMIA (HCC): ICD-10-CM

## 2019-07-17 NOTE — TELEPHONE ENCOUNTER
1) Interruption of Anticoag and for how long before and after treatment?   2) Anesthetic restrictions, Is Epi Ok?   3) Type of antibiotic allowed/ recommended  4) Type of pain meds allowed/recommended for after surgery    1) hold plavix for 5 days prior to procedure, restart asap afterwards.   2) local epi is ok  3) no prophylactic antibiotics recommended from a cardiac perspective  4) No contraindications to pain medications from a cardiac perspective. Would avoid long term use of NSAIDS.     Thanks!

## 2019-07-17 NOTE — TELEPHONE ENCOUNTER
VOICEMAIL  1. Caller Name: wifeYan Kerr                      Call Back Number: [phone] (home)       2. Message: lm that her  needs a referral to would clinic at [facility] since [facility] no longer takes his insurance.   Fax for that office is [phone]    3. Patient approves office to leave a detailed voicemail/MyChart message: no

## 2019-07-17 NOTE — TELEPHONE ENCOUNTER
Form completed with JM recommendations. Faxed to [phone]. Receipt confirmed. Form to scanning. Notified pt that clearance has been faxed and he is ok to hold Plavix 5 days prior to procedure.

## 2019-07-26 ENCOUNTER — HOSPITAL ENCOUNTER (OUTPATIENT)
Dept: LAB | Facility: MEDICAL CENTER | Age: 74
End: 2019-07-26
Attending: FAMILY MEDICINE
Payer: MEDICARE

## 2019-07-26 DIAGNOSIS — I15.2 HYPERTENSION ASSOCIATED WITH DIABETES (HCC): ICD-10-CM

## 2019-07-26 DIAGNOSIS — N17.9 ACUTE RENAL FAILURE SUPERIMPOSED ON STAGE 3 CHRONIC KIDNEY DISEASE, UNSPECIFIED ACUTE RENAL FAILURE TYPE: ICD-10-CM

## 2019-07-26 DIAGNOSIS — E11.21 DIABETIC NEPHROPATHY ASSOCIATED WITH TYPE 2 DIABETES MELLITUS (HCC): ICD-10-CM

## 2019-07-26 DIAGNOSIS — N18.3 ACUTE RENAL FAILURE SUPERIMPOSED ON STAGE 3 CHRONIC KIDNEY DISEASE, UNSPECIFIED ACUTE RENAL FAILURE TYPE: ICD-10-CM

## 2019-07-26 DIAGNOSIS — E11.59 HYPERTENSION ASSOCIATED WITH DIABETES (HCC): ICD-10-CM

## 2019-07-26 DIAGNOSIS — N39.0 RECURRENT UTI: ICD-10-CM

## 2019-07-26 LAB
ALBUMIN SERPL BCP-MCNC: 3.5 G/DL (ref 3.2–4.9)
ALBUMIN/GLOB SERPL: 1.4 G/DL
ALP SERPL-CCNC: 53 U/L (ref 30–99)
ALT SERPL-CCNC: 20 U/L (ref 2–50)
ANION GAP SERPL CALC-SCNC: 8 MMOL/L (ref 0–11.9)
APPEARANCE UR: CLEAR
AST SERPL-CCNC: 24 U/L (ref 12–45)
BACTERIA #/AREA URNS HPF: NEGATIVE /HPF
BILIRUB SERPL-MCNC: 0.6 MG/DL (ref 0.1–1.5)
BILIRUB UR QL STRIP.AUTO: NEGATIVE
BUN SERPL-MCNC: 23 MG/DL (ref 8–22)
CALCIUM SERPL-MCNC: 8.8 MG/DL (ref 8.5–10.5)
CHLORIDE SERPL-SCNC: 104 MMOL/L (ref 96–112)
CO2 SERPL-SCNC: 28 MMOL/L (ref 20–33)
COLOR UR: YELLOW
CREAT SERPL-MCNC: 1.64 MG/DL (ref 0.5–1.4)
EPI CELLS #/AREA URNS HPF: NEGATIVE /HPF
GLOBULIN SER CALC-MCNC: 2.5 G/DL (ref 1.9–3.5)
GLUCOSE SERPL-MCNC: 191 MG/DL (ref 65–99)
GLUCOSE UR STRIP.AUTO-MCNC: NEGATIVE MG/DL
HYALINE CASTS #/AREA URNS LPF: ABNORMAL /LPF
KETONES UR STRIP.AUTO-MCNC: NEGATIVE MG/DL
LEUKOCYTE ESTERASE UR QL STRIP.AUTO: ABNORMAL
MICRO URNS: ABNORMAL
NITRITE UR QL STRIP.AUTO: NEGATIVE
PH UR STRIP.AUTO: 7.5 [PH]
POTASSIUM SERPL-SCNC: 3.8 MMOL/L (ref 3.6–5.5)
PROT SERPL-MCNC: 6 G/DL (ref 6–8.2)
PROT UR QL STRIP: NEGATIVE MG/DL
RBC # URNS HPF: ABNORMAL /HPF
RBC UR QL AUTO: ABNORMAL
SODIUM SERPL-SCNC: 140 MMOL/L (ref 135–145)
SP GR UR STRIP.AUTO: 1.01
UROBILINOGEN UR STRIP.AUTO-MCNC: 0.2 MG/DL
WBC #/AREA URNS HPF: ABNORMAL /HPF

## 2019-07-26 PROCEDURE — 36415 COLL VENOUS BLD VENIPUNCTURE: CPT

## 2019-07-26 PROCEDURE — 87086 URINE CULTURE/COLONY COUNT: CPT

## 2019-07-26 PROCEDURE — 80053 COMPREHEN METABOLIC PANEL: CPT

## 2019-07-26 PROCEDURE — 81001 URINALYSIS AUTO W/SCOPE: CPT

## 2019-07-29 LAB
BACTERIA UR CULT: NORMAL
SIGNIFICANT IND 70042: NORMAL
SITE SITE: NORMAL
SOURCE SOURCE: NORMAL

## 2019-08-07 DIAGNOSIS — Z95.5 PRESENCE OF DRUG COATED STENT IN LEFT CIRCUMFLEX CORONARY ARTERY: ICD-10-CM

## 2019-08-07 DIAGNOSIS — Z95.5 PRESENCE OF DRUG COATED STENT IN LAD CORONARY ARTERY: ICD-10-CM

## 2019-08-07 RX ORDER — CLOPIDOGREL BISULFATE 75 MG/1
TABLET ORAL
Qty: 90 TAB | Refills: 3 | Status: SHIPPED | OUTPATIENT
Start: 2019-08-07 | End: 2020-01-22

## 2019-08-09 ENCOUNTER — HOSPITAL ENCOUNTER (OUTPATIENT)
Dept: LAB | Facility: MEDICAL CENTER | Age: 74
End: 2019-08-09
Attending: INTERNAL MEDICINE
Payer: MEDICARE

## 2019-08-09 PROCEDURE — 80048 BASIC METABOLIC PNL TOTAL CA: CPT

## 2019-08-09 PROCEDURE — 36415 COLL VENOUS BLD VENIPUNCTURE: CPT

## 2019-08-10 LAB
ANION GAP SERPL CALC-SCNC: 9 MMOL/L (ref 0–11.9)
BUN SERPL-MCNC: 25 MG/DL (ref 8–22)
CALCIUM SERPL-MCNC: 9.3 MG/DL (ref 8.5–10.5)
CHLORIDE SERPL-SCNC: 102 MMOL/L (ref 96–112)
CO2 SERPL-SCNC: 27 MMOL/L (ref 20–33)
CREAT SERPL-MCNC: 1.49 MG/DL (ref 0.5–1.4)
GLUCOSE SERPL-MCNC: 184 MG/DL (ref 65–99)
POTASSIUM SERPL-SCNC: 3.8 MMOL/L (ref 3.6–5.5)
SODIUM SERPL-SCNC: 138 MMOL/L (ref 135–145)

## 2019-09-19 ENCOUNTER — HOSPITAL ENCOUNTER (OUTPATIENT)
Dept: LAB | Facility: MEDICAL CENTER | Age: 74
End: 2019-09-19
Attending: FAMILY MEDICINE
Payer: MEDICARE

## 2019-09-19 DIAGNOSIS — N18.30 CKD (CHRONIC KIDNEY DISEASE), STAGE III (HCC): ICD-10-CM

## 2019-09-19 DIAGNOSIS — N20.0 NEPHROLITHIASIS: ICD-10-CM

## 2019-09-19 LAB
APPEARANCE UR: ABNORMAL
BACTERIA #/AREA URNS HPF: ABNORMAL /HPF
BILIRUB UR QL STRIP.AUTO: NEGATIVE
COLOR UR: YELLOW
EPI CELLS #/AREA URNS HPF: NEGATIVE /HPF
GLUCOSE UR STRIP.AUTO-MCNC: 100 MG/DL
HYALINE CASTS #/AREA URNS LPF: ABNORMAL /LPF
KETONES UR STRIP.AUTO-MCNC: NEGATIVE MG/DL
LEUKOCYTE ESTERASE UR QL STRIP.AUTO: ABNORMAL
MICRO URNS: ABNORMAL
NITRITE UR QL STRIP.AUTO: POSITIVE
PH UR STRIP.AUTO: 7.5 [PH] (ref 5–8)
PROT UR QL STRIP: 30 MG/DL
RBC # URNS HPF: >150 /HPF
RBC UR QL AUTO: ABNORMAL
SP GR UR STRIP.AUTO: 1.01
UROBILINOGEN UR STRIP.AUTO-MCNC: 0.2 MG/DL
WBC #/AREA URNS HPF: ABNORMAL /HPF

## 2019-09-19 PROCEDURE — 81001 URINALYSIS AUTO W/SCOPE: CPT

## 2019-09-21 ENCOUNTER — HOSPITAL ENCOUNTER (OUTPATIENT)
Facility: MEDICAL CENTER | Age: 74
End: 2019-09-21
Attending: PHYSICIAN ASSISTANT
Payer: MEDICARE

## 2019-09-21 ENCOUNTER — OFFICE VISIT (OUTPATIENT)
Dept: URGENT CARE | Facility: MEDICAL CENTER | Age: 74
End: 2019-09-21
Payer: MEDICARE

## 2019-09-21 VITALS
BODY MASS INDEX: 27.77 KG/M2 | SYSTOLIC BLOOD PRESSURE: 120 MMHG | HEART RATE: 65 BPM | RESPIRATION RATE: 12 BRPM | DIASTOLIC BLOOD PRESSURE: 78 MMHG | TEMPERATURE: 97.1 F | OXYGEN SATURATION: 98 % | HEIGHT: 72 IN | WEIGHT: 205 LBS

## 2019-09-21 DIAGNOSIS — N39.0 ACUTE UTI: ICD-10-CM

## 2019-09-21 DIAGNOSIS — N39.0 ACUTE UTI: Primary | ICD-10-CM

## 2019-09-21 DIAGNOSIS — R11.14 BILIOUS VOMITING WITH NAUSEA: ICD-10-CM

## 2019-09-21 PROCEDURE — 99214 OFFICE O/P EST MOD 30 MIN: CPT | Performed by: PHYSICIAN ASSISTANT

## 2019-09-21 PROCEDURE — 87086 URINE CULTURE/COLONY COUNT: CPT

## 2019-09-21 PROCEDURE — 87186 SC STD MICRODIL/AGAR DIL: CPT

## 2019-09-21 PROCEDURE — 87077 CULTURE AEROBIC IDENTIFY: CPT

## 2019-09-21 RX ORDER — PROMETHAZINE HYDROCHLORIDE 25 MG/1
25 TABLET ORAL EVERY 6 HOURS PRN
Qty: 30 TAB | Refills: 0 | Status: SHIPPED | OUTPATIENT
Start: 2019-09-21 | End: 2019-10-15

## 2019-09-21 RX ORDER — SULFAMETHOXAZOLE AND TRIMETHOPRIM 800; 160 MG/1; MG/1
1 TABLET ORAL 2 TIMES DAILY
Qty: 14 TAB | Refills: 0 | Status: SHIPPED | OUTPATIENT
Start: 2019-09-21 | End: 2019-09-28

## 2019-09-23 ENCOUNTER — TELEPHONE (OUTPATIENT)
Dept: URGENT CARE | Facility: MEDICAL CENTER | Age: 74
End: 2019-09-23

## 2019-09-23 NOTE — TELEPHONE ENCOUNTER
Called and left message with pt about urine culture results which came back positive for K. pneumoniae  I told her she could continue the abx therapy with a positive urine culture which was sensitive to the abx she was placed on.  Encouraged Pt to call back with questions.  Alfonso Teague PA-C

## 2019-09-24 DIAGNOSIS — E78.5 HYPERLIPIDEMIA ASSOCIATED WITH TYPE 2 DIABETES MELLITUS (HCC): ICD-10-CM

## 2019-09-24 DIAGNOSIS — E11.69 HYPERLIPIDEMIA ASSOCIATED WITH TYPE 2 DIABETES MELLITUS (HCC): ICD-10-CM

## 2019-09-24 RX ORDER — ATORVASTATIN CALCIUM 40 MG/1
TABLET, FILM COATED ORAL
Qty: 90 TAB | Refills: 2 | Status: SHIPPED | OUTPATIENT
Start: 2019-09-24 | End: 2019-10-21 | Stop reason: SDUPTHER

## 2019-09-24 NOTE — TELEPHONE ENCOUNTER
Was the patient seen in the last year in this department? Yes    Does patient have an active prescription for medications requested? No     Received Request Via: Pharmacy     atorvastatin (LIPITOR) 40 MG Tab

## 2019-10-08 ENCOUNTER — OFFICE VISIT (OUTPATIENT)
Dept: CARDIOLOGY | Facility: MEDICAL CENTER | Age: 74
End: 2019-10-08
Payer: MEDICARE

## 2019-10-08 VITALS
SYSTOLIC BLOOD PRESSURE: 136 MMHG | WEIGHT: 205 LBS | BODY MASS INDEX: 27.77 KG/M2 | HEIGHT: 72 IN | DIASTOLIC BLOOD PRESSURE: 84 MMHG | OXYGEN SATURATION: 98 % | HEART RATE: 68 BPM

## 2019-10-08 DIAGNOSIS — I48.0 PAROXYSMAL ATRIAL FIBRILLATION (HCC): ICD-10-CM

## 2019-10-08 DIAGNOSIS — Z86.73 HISTORY OF CEREBROVASCULAR ACCIDENT (CVA) IN ADULTHOOD: ICD-10-CM

## 2019-10-08 DIAGNOSIS — I25.10 CORONARY ARTERY DISEASE INVOLVING NATIVE CORONARY ARTERY OF NATIVE HEART WITHOUT ANGINA PECTORIS: ICD-10-CM

## 2019-10-08 DIAGNOSIS — I73.9 PVD (PERIPHERAL VASCULAR DISEASE) (HCC): ICD-10-CM

## 2019-10-08 DIAGNOSIS — N18.30 STAGE 3 CHRONIC KIDNEY DISEASE (HCC): ICD-10-CM

## 2019-10-08 DIAGNOSIS — I15.2 HYPERTENSION ASSOCIATED WITH DIABETES (HCC): ICD-10-CM

## 2019-10-08 DIAGNOSIS — E11.59 HYPERTENSION ASSOCIATED WITH DIABETES (HCC): ICD-10-CM

## 2019-10-08 PROCEDURE — 99214 OFFICE O/P EST MOD 30 MIN: CPT | Performed by: INTERNAL MEDICINE

## 2019-10-08 RX ORDER — LOSARTAN POTASSIUM 50 MG/1
50 TABLET ORAL DAILY
Qty: 90 TAB | Refills: 3 | Status: SHIPPED | OUTPATIENT
Start: 2019-10-08 | End: 2020-07-06

## 2019-10-08 ASSESSMENT — ENCOUNTER SYMPTOMS
BRUISES/BLEEDS EASILY: 1
MYALGIAS: 1
VOMITING: 1

## 2019-10-08 NOTE — PROGRESS NOTES
Cardiology Follow-up Consultation Note    Date of note:    10/8/2019  Primary Care Provider: Mitchell Pantoja M.D.  Referring Provider: Leonardo.     Patient Name: Av Bob   YOB: 1945  MRN:              [id number]    Chief Complaint: CAD    History of Present Illness: Av Bob is a 74 y.o. male whose current medical problems include CKD stage III, hypertension, CAD  (GIOVANNA to RCA in '97, GIOVANNA X2 to LAD and GIOVANNA X to OM in '09), atrial fibrillation, diabetes, dyslipidemia, gout, CVA 12/2016,  non-hodgkins lymphoma c/b brain lesions with resultant left hemiparesis s/p chemotherapy and depression who is here for follow-up.     At our visit, 4/3/2018:  In terms of his CVA, this was in 2016, and while he was on aspirin and plavix at [facility].  This was in the setting of active lymphoma.    He was also admitted to [facility] again in 5/2017 for sepsis and was noted to have atrial fibrillation at this time. He has no noted recurrence and no episodes before that.  He has never been on anticoagulation for Cva prevention.     In terms of his NHL, he sees Dr. Noel, and his last PET scan showed he was cancer free.     Right leg wounds, currently healing. Evaluated by Dr. Terry for bilateral tibial artery stenosis, and decided against surgery at this time.     At our visit, 10/9/2018:  Started xarelto and had episodes of melena.  Switched back to plavix.  FOBT was negative.     At our visit, 4/16/2019:  Admitted 11/21/2018 for weakness, negative cerebrovascular work-up.       Interim Events:  In terms of PSVT, no palpitations.     In terms of hypertension, well controlled. Sometimes 90s/60s. Asymptomatic.     In terms of Cad, no angina.     In terms of cancer, no e/o recurrence.     Still works with  an hour three times a week, no symptoms.    Was admitted for UTI this summer.     Did have peripheral angiogram, noted stenosis but no  intervention performed. At [facility].     Review of Systems   Hematologic/Lymphatic: Bruises/bleeds easily.   Musculoskeletal: Positive for myalgias.   Gastrointestinal: Positive for vomiting (related to occasional choking or UTI).   Genitourinary: Positive for frequency.     All other systems reviewed and discussed using a comprehensive questionnaire and are negative.              Past Medical History:   Diagnosis Date   • Acute respiratory failure with hypoxia ([facility]) 5/20/2017   • CAD (coronary artery disease)     GIOVANNA to RCA in '97, GIOVANNA X2 to LAD and GIOVANNA X2 to OM in '09   • Cancer ([facility])     2017; chemo lympoma   • Cataract    • Cerebrovascular accident (CVA) ([facility]) 12/30/2016    Left arm weakness  etiology of stroke not established, lymphoma discovered on MRI evaluation of stroke, L hemiparesis much worse after acute infectious illness in mid 2017, but no specific diagnosed recurrent neurological etiology, all at [facility]   • CKD (chronic kidney disease) stage 3, GFR 30-59 ml/min ([facility])    • Controlled gout 2014   • Coronary atherosclerosis of native coronary artery     S/P PTCA (percutaneous transluminal coronary angioplasty), RCA, 5/1997, patent on cath 7/10/2009 at the time of interventions on his left anterior descending and circumflex coronary arteries   • Depression    • Diabetes ([facility])    • Enterococcal septicemia ([facility]) 8/12/2017   • Hypertension    • Hypokalemia 2012    controlled with combination of ACE inhibitor or ARB plus spironolactone   • Hypomagnesemia 08/12/2017    etiology uncertain   • Lymphoma ([facility]) 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 Barry   • NSTEMI (non-ST elevated myocardial infarction) ([facility]) 07/18/2017    complicating UTI with sepsis   • Pain    • Polyneuropathy in diabetes(357.2) 9/11/2013   • Septic shock ([facility]) 5/20/2017   • Skin ulcer of calf ([facility]) 2015    Right, Dr. Terry and  wound care   • Stroke ([facility]) 2016    left sided weakness   • Urinary bladder disorder    • Urinary incontinence    • Wound of left leg 2012    Requiring surgery and debridment, Dr. Moore         Past Surgical History:   Procedure Laterality Date   • CYSTOSCOPY STENT PLACEMENT Left 2/12/2018    Procedure: CYSTOSCOPY  ;  Surgeon: Rey Barry M.D.;  Location: SURGERY [facility];  Service: Urology   • URETEROSCOPY Left 2/12/2018    Procedure: URETEROSCOPY- FLEXIBLE  ;  Surgeon: Rey Barry M.D.;  Location: SURGERY [facility];  Service: Urology   • LASERTRIPSY Left 2/12/2018    Procedure: LASERTRIPSY - LITHO  ;  Surgeon: Rey Barry M.D.;  Location: SURGERY [facility];  Service: Urology   • WOUND CLOSURE GENERAL  4/3/2012    Performed by GERHARD MOORE at SURGERY SAME DAY [facility] ORS   • [facility] CARDIAC CATH  2009    Stents to LAD, Om   • DAGOBERTO BY LAPAROSCOPY  1998   • ANGIOPLASTY  1997    RCA followed by other stents as noted above.    • [facility] CARDIAC CATH  1997    stent RCA   • CATARACT EXTRACTION WITH IOL      bilateral   • LITHOTRIPSY     • TONSILLECTOMY AND ADENOIDECTOMY           Current Outpatient Medications   Medication Sig Dispense Refill   • atorvastatin (LIPITOR) 40 MG Tab TAKE 1 TABLET BY MOUTH EVERY DAY 90 Tab 2   • allopurinol (ZYLOPRIM) 100 MG Tab Take 1 Tab by mouth every day. 90 Tab 0   • clopidogrel (PLAVIX) 75 MG Tab TAKE 1 TABLET BY MOUTH EVERY DAY 90 Tab 3   • Dulaglutide (TRULICITY) 1.5 MG/0.5ML Solution Pen-injector Inject 1.5 mg as instructed every 7 days. On Tue 12 PEN 3   • atorvastatin (LIPITOR) 40 MG Tab Take 40 mg by mouth every evening.     • clopidogrel (PLAVIX) 75 MG Tab Take 75 mg by mouth every evening.     • fenofibrate (TRICOR) 145 MG Tab Take 145 mg by mouth every evening.     • magnesium oxide (MAG-OX) 400 (241.3 Mg) MG Tab tablet Take 800 mg by mouth 2 times a day.     • losartan (COZAAR) 25 MG Tab Take 2 Tabs by mouth every day. DO NOT RESUME UNTIL Wednesday  July 3 1 Tab 0   • Insulin Glargine (TOUJEO MAX SOLOSTAR) 300 UNIT/ML Solution Pen-injector Inject 40 Units as instructed every bedtime.     • insulin lispro, Human, (HUMALOG KWIKPEN) 100 UNIT/ML Solution Pen-injector injection Inject 5-9 Units as instructed 3 times a day before meals. Sliding Scale     • fluoxetine (PROZAC) 40 MG capsule TAKE 1 CAPSULE BY MOUTH EVERY DAY 90 Cap 2   • potassium chloride (KLOR-CON) 8 MEQ tablet Take 1 Tab by mouth every day. 90 Tab 3   • metoprolol (TOPROL-XL) 200 MG XL tablet Take 1 Tab by mouth every evening. 90 Tab 3   • DAILY MULTIPLE VITAMINS PO Take 1 Tab by mouth every day.     • ascorbic acid (VITAMIN C) 500 MG tablet Take 500 mg by mouth every day.     • Cholecalciferol (VITAMIN D) 2000 units Cap Take 2,000 Units by mouth 2 Times a Day.     • aspirin 81 MG tablet Take 81 mg by mouth every day.     • promethazine (PHENERGAN) 25 MG Tab Take 1 Tab by mouth every 6 hours as needed for Nausea/Vomiting. 30 Tab 0   • diclofenac (SOLARAZE) 3 % gel Apply 1 Application to affected area(s) as needed (Apply's on lower back and hip).     • acetaminophen (TYLENOL) 500 MG Tab Take 1,000 mg by mouth every 6 hours as needed for Mild Pain.       No current facility-administered medications for this visit.          Allergies   Allergen Reactions   • Diphenhydramine Hcl Anxiety     Pt is able to tolerate  Mg benadryl with less anxiety   • Lorazepam Unspecified     Disorientation   • Ciprofloxacin      Rash,stomach ache         Family History   Problem Relation Age of Onset   • Heart Disease Father         CAD   • Diabetes Father    • Cancer Mother    • Psychiatric Illness Mother         Depression   • Depression Mother    • Kidney stones Brother    • Heart Disease Brother    • Psychiatric Illness Brother         Depression   • Depression Brother    • Suicide Attempts Other    • Psychiatric Illness Other         autism         Social History     Socioeconomic History   • Marital status:       Spouse name: Not on file   • Number of children: Not on file   • Years of education: Not on file   • Highest education level: Not on file   Occupational History   • Not on file   Social Needs   • Financial resource strain: Not on file   • Food insecurity:     Worry: Not on file     Inability: Not on file   • Transportation needs:     Medical: Not on file     Non-medical: Not on file   Tobacco Use   • Smoking status: Never Smoker   • Smokeless tobacco: Never Used   Substance and Sexual Activity   • Alcohol use: No   • Drug use: No   • Sexual activity: Not Currently     Partners: Female   Lifestyle   • Physical activity:     Days per week: Not on file     Minutes per session: Not on file   • Stress: Not on file   Relationships   • Social connections:     Talks on phone: Not on file     Gets together: Not on file     Attends L[employer] service: Not on file     Active member of club or organization: Not on file     Attends meetings of clubs or organizations: Not on file     Relationship status: Not on file   • Intimate partner violence:     Fear of current or ex partner: Not on file     Emotionally abused: Not on file     Physically abused: Not on file     Forced sexual activity: Not on file   Other Topics Concern   • Not on file   Social History Narrative   • Not on file         Physical Exam:  Ambulatory Vitals  /84 (BP Location: Right arm, Patient Position: Sitting, BP Cuff Size: Adult)   Pulse 68   Ht 1.829 m (6')   Wt 93 kg (205 lb)   SpO2 98%    Oxygen Therapy:  Pulse Oximetry: 98 %  BP Readings from Last 4 Encounters:   10/08/19 156/90   09/21/19 120/78   07/09/19 126/80   07/02/19 118/76       Weight/BMI: Body mass index is 27.8 kg/m².  Wt Readings from Last 4 Encounters:   10/08/19 93 kg (205 lb)   09/21/19 93 kg (205 lb)   07/09/19 93 kg (205 lb)   07/02/19 93 kg (205 lb)       General: No apparent distress  Eyes: nl conjunctiva  ENT: OP clear, normal external appearance of nose and ears  Neck:  JVP <8 cm H2O, no carotid bruits  Lungs: normal respiratory effort, CTAB  Heart: RRR, no murmurs, no rubs or gallops, trace edema bilateral lower extremities. No LV/RV heave on cardiac palpatation. 2+ bilateral radial pulses.    Abdomen: soft, non tender, non distended, no masses, normal bowel sounds.  No HSM.  Extremities/MSK: no clubbing, no cyanosis  Neurological: decreased sensation on the left, left extremity weakness.   Psychiatric: Appropriate affect, A/O x 3, intact judgement and insight  Skin: Warm extremities    Exam repeated in full and unchanged except for as noted above.    Lab Data Review:  Lab Results   Component Value Date/Time    CHOLSTRLTOT 151 02/14/2019 11:05 AM    LDL 92 02/14/2019 11:05 AM    HDL 32 (A) 02/14/2019 11:05 AM    TRIGLYCERIDE 136 02/14/2019 11:05 AM       Lab Results   Component Value Date/Time    SODIUM 138 08/09/2019 01:10 PM    POTASSIUM 3.8 08/09/2019 01:10 PM    CHLORIDE 102 08/09/2019 01:10 PM    CO2 27 08/09/2019 01:10 PM    GLUCOSE 184 (H) 08/09/2019 01:10 PM    BUN 25 (H) 08/09/2019 01:10 PM    CREATININE 1.49 (H) 08/09/2019 01:10 PM    CREATININE 1.4 05/28/2008 05:42 PM    BUNCREATRAT 10 03/27/2017 02:11 PM     Lab Results   Component Value Date/Time    ALKPHOSPHAT 53 07/26/2019 12:45 PM    ASTSGOT 24 07/26/2019 12:45 PM    ALTSGPT 20 07/26/2019 12:45 PM    TBILIRUBIN 0.6 07/26/2019 12:45 PM      Lab Results   Component Value Date/Time    WBC 9.3 07/01/2019 04:48 AM     No components found for: HBGA1C  No components found for: TROPONIN  No components found for: BNP      Cardiac Imaging and Procedures Review:    EKG dated 12/27/2017:  SINUS RHYTHM   RIGHT AXIS DEVIATION, POSSIBLY DUE TO ELECTRODE PLACEMENT AND POSITION   BORDERLINE T ABNORMALITIES, ANT-LAT LEADS, UNCHANGED   BORDERLINE PROLONGED QT INTERVAL   Compared to ECG 09/22/2017 13:03:34   NO SIGNIFICANT CHANGE ALLOWING FOR ELECTRODE PLACEMENT (LEFT ARM AND FOOT)     Echo dated 11/21/2018:  CONCLUSIONS  Prior  echocardiogram 10/5/2017 - no noted changes.  The effusion is   new.  Left ventricular ejection fraction is visually estimated to be 60%.  Normal inferior vena cava size and inspiratory collapse.  Trivial to small pericardial effusion noted without evidence of   hemodynamic compromise.  No significant valve disease or flow abnormalities.     Holter, 5/30/2018:    CONCLUSIONS:  * paroxysmal supraventricular tachycardia.  15 episodes over 13 days.  Longest episode 17 seconds  * No atrial fibrillation  * No significant ectopy  * No arrhythmias or ectopy during symptoms      Nuclear Perfusion Imaging (1/2018):   Myocardial Perfusion   Report   NUCLEAR IMAGING INTERPRETATION   The LV is mildly dilated with global hypokinesis.  Calculated LV EF is 49%.     There is a small region of decreased perfusion in the inferior-lateral wall    with a mild amount of inducible ischemia.   ECG INTERPRETATION   Negative stress ECG for ischemia.      Stress test 6/2017 ([facility])    Findings:   Small in size moderate in intensity fixed apical defect. Computer analysis also suggests mild in intensity small in size diminished counts along inferior wall of left ventricle which appears to improve at time of rest. Summed stress score is   7. Summed rest score is 1. Gross hypomotility of the left ventricle is noted with disordered contraction evident. Global hypokinesis is noted.      [facility] (2009): at [facility], last stent.     Radiology test Review:  CXR: 1/2018   FINDINGS:  The heart is normal in size.  No pulmonary infiltrates or consolidations are noted.  No pleural effusions are appreciated.    Vascular US 2/2018:  Vascular Laboratory   Conclusions   There is no evidence of arterial disease demonstrated (PADMINI is .9-1.0).    Absent flow within the Left VIVIENNE.     Vascular Laboratory   CONCLUSIONS   Right Lower Extremity-   Normal flow from the common femoral artery extending distally to the    popliteal artery. Flow within the  posterior tibial artery is brisk and    multiphasic.   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     Left Lower Extremity-   Normal flow seen from the common femoral artery extending distally to the    popliteal artery.   Area of elevated velocities seen within the mid segment of the posterior    tibial artery. Consistent with >50% stenosis.   Occlusion of the anterior tibial artery at the mid-distal segment, minimal    flow reversal seen at the level of the ankle.      MRI 12/31/2016  7 mm acute/early subacute infarct is present in the right anterolateral medulla.  There is no significant mass effect or hemorrhage.  No other recent infarction.    12/31/2016:  Three sites of abnormal parenchymal enhancement are present.  *  At the previously seen right anterior pontine lesion, there is a 5 x 3 x 7 mm elongated enhancing peripheral pontine lesion.  *  There is a second 2 mm enhancing focus also in the right anterior david.  *  There is a third 3 mm lesion at the left globus pallidus internus.    These findings may represent metastatic disease.  The elongated morphology of the anterior peripheral pontine lesion is somewhat atypical for metastatic lesion, and could potentially represent enhancement of a subacute infarct.  Two other lesions could also, in theory, represent subacute enhancing reperfusion of lacunar infarctions. Anecdotally, this would be an unusual finding in lacunar infarctions. Note that post infarct enhancement typically occurs approximately 1 week after infarction and resolves within 12 weeks or less.       Head CTA 12/30/2016:  1. There are plaque formations identified in both carotid bifurcations   (right greater than left). This causes approximately 50% stenosis on the   right and 10-20% stenosis on the left in the proximal internal carotid   arteries. The remainder of the carotid   vasculature is unremarkable.  2. The  vertebral arteries are within normal limits.  3. No dissection.  4. A 5.5 mm left thyroid lobe cyst/nodule is present.  5. A 10 x 13 mm partially calcified pulmonary nodule is present in the   left upper lobe. There may be some internal fat density. Findings could   represent a benign pulmonary hamartoma. Recommend clinical correlation.  6. Moderate to advanced degenerative changes are present throughout the   cervical spine.      Medical Decision Makin. Atherosclerosis of native coronary artery of native heart without angina pectoris  S/p 5 stents, last in . Mild ischemia on two recent nuclear perfusion scans    Currently without recurrent chest pain (had some atypical chest pain 2017) so will treat medically.  -continue aspirin/lipitor  -fasting blood tests ordered for next labs, lipid panel.       2. 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 Rasheed, now well controlled, last hgbA1c <7    3. Essential hypertension, benign  Well controlled at home.   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   -Continue losartan 50mg PO daily    4. Paroxysmal atrial fibrillation (CMS-HCC)  Self limited and one time in setting of sepsis. Previously discussed at length risks of recurrent CVA given his previous likely embolic event, and if he would like an ILR to confirm diagnosis or to start empiric anticoagulation.  He prefers the later and we started xarelto 2018.  Unfortunately he had melena almost immediately, and was switched back to aspirin/plavix.  He does not want to retrial anticoagulation, or a GI referral as he sees enough physicians already.   -continue aspirin/plavix. No anticoag for now given patient preference despite risk of recurrent CVA.    -continue metoprolol XL 200mg PO Daily    5. CKD (chronic kidney disease) stage 3, GFR 30-59 ml/min  Stable.  -avoid  spironolactone  -continue losartan,   -followed by dr. Jarvis.     6. Diffuse large cell non-Hodgkin's lymphoma (CMS-HCC)  Followed by Dr. Noel, in remission per report with recent negative PET scan.    7. Left hemiparesis (CMS-HCC)  Acute 12/2016 at [facility].  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  Symptoms originally improved significantly with chemotherapy and he was walking well, and then hemiparesis worsened significantly after sepsis from UTI and norovirus infection mid 2017.  Currently continuing physical therapy, but remains in a wheelchair.       8. Cerebrovascular accident (CVA) due to embolism of cerebral artery (CMS-HCC)  In addition to cerebral enhancing lesions thought to be lymphoma on MRI 12/2016, he was found to have a subacute infarct in the right anterolateral medulla.    -aspirin/plavix for CVA prevention    9. Dyslipidemia  Lipitor, fenofibrate. Recheck lipids with next labs.     10. Peripheral vascular disease (CMS-HCC)  Followed by Dr. Terry previously but now followed by Dr. Mohsen at [facility]. No recent intervention. Contributing to non-healing wounds.   -continue aspirin/plavix     11. Goals of care - this is completed, they have scanned in a card with the number to cloud access to their documents.         Return in about 6 months (around 4/8/2020).       Osvaldo Tucker MD  [facility]  [facility], [address]  [address] [address]  Phone: [phone]  Fax: [phone]

## 2019-10-14 NOTE — PROGRESS NOTES
Endocrinology Clinic Progress Note  PCP: Mitchell Pantoja M.D.    HPI:  Eligio Madrigal is a 74 y.o. old patient who comes in today for routine follow up of Management of Uncontrolled Type 2 Diabetes with stage 3 chronic kidney disease, Hypertension, and Vitamin D Deficiency.       Vitamin D Deficiency: Currently on Vitamin D 2000 iu BID and multivitamin.  Results for ELIGIO MADRIGAL (MRN [id number]) as of 10/14/2019 07:50   Ref. Range 5/22/2019 14:20   25-Hydroxy   Vitamin D 25 Latest Ref Range: 30 - 100 ng/mL 35        Hypertension:  Controlled with Losartan 50 mg per day and Metoprolol  mg daily.     Hyperlipidemia:  Controlled with Atorvastatin 40 mg per day.  Results for ELIGIO MADRIGAL (MRN [id number]) as of 10/14/2019 07:55   Ref. Range 2/14/2019 11:05   Cholesterol,Tot Latest Ref Range: 100 - 199 mg/dL 151   Triglycerides Latest Ref Range: 0 - 149 mg/dL 136   HDL Latest Ref Range: >=40 mg/dL 32 (A)   LDL Latest Ref Range: <100 mg/dL 92       HPI:  Eligio Madrigal is a 74 y.o. old patient who comes in today for evaluation of above stated problem.    Most Recent HbA1c:   Lab Results   Component Value Date/Time    HBA1C 6.2 (A) 10/15/2019 02:21 PM        Current Diabetes Regimen:  GLP-1 Agent: Dulaglutide 1.5 mg once weekly   SGLT-2 Inhibitor:  Farxiga stopped due to yeast infection  Basal Insulin: Toujeo 40 units daily  Prandial Insulin: Humalog base of 5 units plus 1:50>150 correction.  Testing 4 times daily for insulin adjustment and symptoms of hypoglycemia for the last 90 days.  Before Breakfast:96, 70,86, 103, 94, 106,70  Before Lunch: 160,171, [id number]  Before Dinner: 141, 159,122, 183,148,    Hypoglycemia:  rare    ROS:  Constitutional: No weight loss  Cardiac: No palpitations or racing heart  Resp: No shortness of breath  Neuro: No numbness or tinging in feet  Endo: No heat or cold intolerance, no polyuria or polydipsia  All other systems were reviewed and were  negative.    Past Medical History:  Patient Active Problem List    Diagnosis Date Noted   • ([facility]) Paroxysmal atrial fibrillation 05/23/2017     Priority: Medium   • ([facility]) Hypertension associated with diabetes 03/22/2017     Priority: Medium   • ([facility]) 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, without long-term current use of insulin 12/30/2016     Priority: Medium   • H/O Cerebrovascular accident (CVA) in adulthood 12/30/2016     Priority: Medium   • Coronary artery disease involving native coronary artery of native heart without angina pectoris 12/30/2016     Priority: Medium   • ([facility]) Hyperlipidemia associated with type 2 diabetes mellitus 12/13/2011     Priority: Medium   • GERD with esophagitis 10/19/2018     Priority: Low   • Recurrent UTI 04/26/2018     Priority: Low   • ([facility]) Left hemiparesis 12/27/2017     Priority: Low   • ([facility]) Diabetic nephropathy associated with type 2 diabetes mellitus 11/30/2017     Priority: Low   • Psychophysiological insomnia 11/21/2017     Priority: Low   • ([facility]) Moderate episode of recurrent major depressive disorder 11/07/2017     Priority: Low   • Wheelchair dependent 11/07/2017     Priority: Low   • Normocytic hypochromic anemia 05/20/2017     Priority: Low   • H/O non-Hodgkin's lymphoma 05/08/2017     Priority: Low   • Stage 3 chronic kidney disease ([facility]) 08/25/2016     Priority: Low   • Idiopathic chronic gout of multiple sites without tophus 01/28/2014     Priority: Low   • PVD (peripheral vascular disease) ([facility]) 10/08/2019   • Diarrhea 07/02/2019   • Snapping hip syndrome, right 05/20/2019   • Strain of flexor muscle of right hip 05/20/2019   • Strain of right hamstring 05/20/2019   • Muscle strain of right gluteal region 05/20/2019   • Left hand weakness 05/20/2019   • Urinary incontinence without sensory awareness 02/19/2019   • ([facility]) Chronic ulcer of right lower extremity with fat layer exposed  12/27/2018   • (HCC) Tibial artery disease 12/27/2018       Past Surgical History:  Past Surgical History:   Procedure Laterality Date   • CYSTOSCOPY STENT PLACEMENT Left 2/12/2018    Procedure: CYSTOSCOPY  ;  Surgeon: Rey Barry M.D.;  Location: SURGERY [facility];  Service: Urology   • URETEROSCOPY Left 2/12/2018    Procedure: URETEROSCOPY- FLEXIBLE  ;  Surgeon: Rye Barry M.D.;  Location: SURGERY [facility];  Service: Urology   • LASERTRIPSY Left 2/12/2018    Procedure: LASERTRIPSY - LITHO  ;  Surgeon: Rey Barry M.D.;  Location: SURGERY [facility];  Service: Urology   • WOUND CLOSURE GENERAL  4/3/2012    Performed by GERHARD GILBERT at SURGERY SAME DAY [facility]   • ZZZ CARDIAC CATH  2009    Stents to LAD, Om   • DAGOBERTO BY LAPAROSCOPY  1998   • ANGIOPLASTY  1997    RCA followed by other stents as noted above.    • ZZZ CARDIAC CATH  1997    stent RCA   • CATARACT EXTRACTION WITH IOL      bilateral   • LITHOTRIPSY     • TONSILLECTOMY AND ADENOIDECTOMY         Allergies:  Diphenhydramine hcl; Lorazepam; Ciprofloxacin; and Spironolactone    Social History:  Social History     Socioeconomic History   • Marital status:      Spouse name: Not on file   • Number of children: Not on file   • Years of education: Not on file   • Highest education level: Not on file   Occupational History   • Not on file   Social Needs   • Financial resource strain: Not on file   • Food insecurity:     Worry: Not on file     Inability: Not on file   • Transportation needs:     Medical: Not on file     Non-medical: Not on file   Tobacco Use   • Smoking status: Never Smoker   • Smokeless tobacco: Never Used   Substance and Sexual Activity   • Alcohol use: No   • Drug use: No   • Sexual activity: Not Currently     Partners: Female   Lifestyle   • Physical activity:     Days per week: Not on file     Minutes per session: Not on file   • Stress: Not on file   Relationships   • Social connections:     Talks on  phone: Not on file     Gets together: Not on file     Attends Taoist service: Not on file     Active member of club or organization: Not on file     Attends meetings of clubs or organizations: Not on file     Relationship status: Not on file   • Intimate partner violence:     Fear of current or ex partner: Not on file     Emotionally abused: Not on file     Physically abused: Not on file     Forced sexual activity: Not on file   Other Topics Concern   • Not on file   Social History Narrative   • Not on file       Family History:  Family History   Problem Relation Age of Onset   • Heart Disease Father         CAD   • Diabetes Father    • Cancer Mother    • Psychiatric Illness Mother         Depression   • Depression Mother    • Kidney stones Brother    • Heart Disease Brother    • Psychiatric Illness Brother         Depression   • Depression Brother    • Suicide Attempts Other    • Psychiatric Illness Other         autism       Medications:    Current Outpatient Medications:   •  losartan (COZAAR) 50 MG Tab, Take 1 Tab by mouth every day., Disp: 90 Tab, Rfl: 3  •  atorvastatin (LIPITOR) 40 MG Tab, TAKE 1 TABLET BY MOUTH EVERY DAY, Disp: 90 Tab, Rfl: 2  •  allopurinol (ZYLOPRIM) 100 MG Tab, Take 1 Tab by mouth every day., Disp: 90 Tab, Rfl: 0  •  clopidogrel (PLAVIX) 75 MG Tab, TAKE 1 TABLET BY MOUTH EVERY DAY, Disp: 90 Tab, Rfl: 3  •  Dulaglutide (TRULICITY) 1.5 MG/0.5ML Solution Pen-injector, Inject 1.5 mg as instructed every 7 days. On Tue, Disp: 12 PEN, Rfl: 3  •  fenofibrate (TRICOR) 145 MG Tab, Take 145 mg by mouth every evening., Disp: , Rfl:   •  magnesium oxide (MAG-OX) 400 (241.3 Mg) MG Tab tablet, Take 800 mg by mouth 2 times a day., Disp: , Rfl:   •  Insulin Glargine (TOUJEO MAX SOLOSTAR) 300 UNIT/ML Solution Pen-injector, Inject 40 Units as instructed every bedtime., Disp: , Rfl:   •  insulin lispro, Human, (HUMALOG KWIKPEN) 100 UNIT/ML Solution Pen-injector injection, Inject 5-9 Units as instructed 3  times a day before meals. Sliding Scale, Disp: , Rfl:   •  fluoxetine (PROZAC) 40 MG capsule, TAKE 1 CAPSULE BY MOUTH EVERY DAY, Disp: 90 Cap, Rfl: 2  •  potassium chloride (KLOR-CON) 8 MEQ tablet, Take 1 Tab by mouth every day., Disp: 90 Tab, Rfl: 3  •  metoprolol (TOPROL-XL) 200 MG XL tablet, Take 1 Tab by mouth every evening., Disp: 90 Tab, Rfl: 3  •  DAILY MULTIPLE VITAMINS PO, Take 1 Tab by mouth every day., Disp: , Rfl:   •  acetaminophen (TYLENOL) 500 MG Tab, Take 1,000 mg by mouth every 6 hours as needed for Mild Pain., Disp: , Rfl:   •  ascorbic acid (VITAMIN C) 500 MG tablet, Take 500 mg by mouth every day., Disp: , Rfl:   •  Cholecalciferol (VITAMIN D) 2000 units Cap, Take 2,000 Units by mouth 2 Times a Day., Disp: , Rfl:   •  aspirin 81 MG tablet, Take 81 mg by mouth every day., Disp: , Rfl:     Labs: Reviewed    Physical Examination:  Vital signs: /70   Pulse 69   Ht 1.829 m (6')   Wt 93 kg (205 lb)   SpO2 97%   BMI 27.80 kg/m²  Body mass index is 27.8 kg/m².  General: No apparent distress, cooperative, wheelchair-bound  Eyes: No scleral icterus or discharge  ENMT: Normal on external inspection of nose, lips, normal thyroid exam  Neck: No abnormal masses on inspection  Resp: Normal effort, clear to auscultation bilaterally   CVS: Regular rate and rhythm, S1 S2 normal, no murmur   Extremities: No edema  Abdomen: abdominal obesity present  Neuro: Alert and oriented  Skin: No rash  Psych: Normal mood and affect, intact memory and able to make informed decisions    Foot Exam:  Monofilament: done  Monofilament testing with a 10 gram force: sensation intact: decreased on right  Visual Inspection: Feet without maceration, ulcers, fissures.  Pedal pulses: intact bilaterally    Assessment and Plan:    1. Uncontrolled type 2 diabetes mellitus with hyperglycemia (HCC)  Continue current regimen  - Discussed diabetic diet discussed in detail-plate method.  - He will test before meals and log .  - He is in  a wheelchair due to history of cva  - Reviewed medications and advised how to take   - Discussed importance of immunizations and yearly eye exams. He saw Dr. Cox the first of the year.  He has another appointment scheduled and will bring us his eye report.  - Advised daily foot exams. Educated on signs of infection.   - Educated on need to stay well hydrated with water.  - Educated to call with any questions or problems.    2. Essential hypertension  Controlled    3. Vitamin D deficiency  continue vitamin D supplementation.    Return in about 3 months (around 1/15/2020).    Thank you for allowing me to participate in the care of this patient.    Dr. Fran Rasheed  This note was scribed by Zunilda Chan RN, CDE  10/15/19    CC:   Mitchell Pantoja M.D.    This note was created using voice recognition software (Dragon). The accuracy of the dictation is limited by the abilities of the software. I have reviewed the note prior to signing, however some errors in grammar and context are still possible. If you have any questions related to this note please do not hesitate to contact our office.

## 2019-10-15 ENCOUNTER — OFFICE VISIT (OUTPATIENT)
Dept: ENDOCRINOLOGY | Facility: MEDICAL CENTER | Age: 74
End: 2019-10-15
Payer: MEDICARE

## 2019-10-15 VITALS
WEIGHT: 205 LBS | HEART RATE: 69 BPM | SYSTOLIC BLOOD PRESSURE: 108 MMHG | OXYGEN SATURATION: 97 % | BODY MASS INDEX: 27.77 KG/M2 | HEIGHT: 72 IN | DIASTOLIC BLOOD PRESSURE: 70 MMHG

## 2019-10-15 DIAGNOSIS — E11.65 UNCONTROLLED TYPE 2 DIABETES MELLITUS WITH HYPERGLYCEMIA (HCC): ICD-10-CM

## 2019-10-15 DIAGNOSIS — E55.9 VITAMIN D DEFICIENCY: ICD-10-CM

## 2019-10-15 DIAGNOSIS — I10 ESSENTIAL HYPERTENSION: ICD-10-CM

## 2019-10-15 LAB
HBA1C MFR BLD: 6.2 % (ref 0–5.6)
INT CON NEG: ABNORMAL
INT CON POS: ABNORMAL

## 2019-10-15 PROCEDURE — 99214 OFFICE O/P EST MOD 30 MIN: CPT | Performed by: INTERNAL MEDICINE

## 2019-10-15 PROCEDURE — 83036 HEMOGLOBIN GLYCOSYLATED A1C: CPT | Performed by: INTERNAL MEDICINE

## 2019-10-17 ENCOUNTER — HOSPITAL ENCOUNTER (OUTPATIENT)
Dept: LAB | Facility: MEDICAL CENTER | Age: 74
End: 2019-10-17
Attending: INTERNAL MEDICINE
Payer: MEDICARE

## 2019-10-17 DIAGNOSIS — E11.59 HYPERTENSION ASSOCIATED WITH DIABETES (HCC): ICD-10-CM

## 2019-10-17 DIAGNOSIS — N18.30 CKD (CHRONIC KIDNEY DISEASE), STAGE III (HCC): ICD-10-CM

## 2019-10-17 DIAGNOSIS — I15.2 HYPERTENSION ASSOCIATED WITH DIABETES (HCC): ICD-10-CM

## 2019-10-17 LAB
ANION GAP SERPL CALC-SCNC: 8 MMOL/L (ref 0–11.9)
APPEARANCE UR: ABNORMAL
BACTERIA #/AREA URNS HPF: ABNORMAL /HPF
BILIRUB UR QL STRIP.AUTO: NEGATIVE
BUN SERPL-MCNC: 26 MG/DL (ref 8–22)
CALCIUM SERPL-MCNC: 9.4 MG/DL (ref 8.5–10.5)
CHLORIDE SERPL-SCNC: 103 MMOL/L (ref 96–112)
CHOLEST SERPL-MCNC: 160 MG/DL (ref 100–199)
CO2 SERPL-SCNC: 26 MMOL/L (ref 20–33)
COLOR UR: YELLOW
CREAT SERPL-MCNC: 1.56 MG/DL (ref 0.5–1.4)
CREAT UR-MCNC: 44 MG/DL
EPI CELLS #/AREA URNS HPF: ABNORMAL /HPF
FASTING STATUS PATIENT QL REPORTED: NORMAL
GLUCOSE SERPL-MCNC: 103 MG/DL (ref 65–99)
GLUCOSE UR STRIP.AUTO-MCNC: NEGATIVE MG/DL
HDLC SERPL-MCNC: 33 MG/DL
KETONES UR STRIP.AUTO-MCNC: NEGATIVE MG/DL
LDLC SERPL CALC-MCNC: 104 MG/DL
LEUKOCYTE ESTERASE UR QL STRIP.AUTO: ABNORMAL
MICRO URNS: ABNORMAL
MICROALBUMIN UR-MCNC: 11 MG/DL
MICROALBUMIN/CREAT UR: 250 MG/G (ref 0–30)
NITRITE UR QL STRIP.AUTO: POSITIVE
PH UR STRIP.AUTO: 7 [PH] (ref 5–8)
POTASSIUM SERPL-SCNC: 3.6 MMOL/L (ref 3.6–5.5)
PROT UR QL STRIP: 30 MG/DL
RBC # URNS HPF: ABNORMAL /HPF
RBC UR QL AUTO: ABNORMAL
SODIUM SERPL-SCNC: 137 MMOL/L (ref 135–145)
SP GR UR STRIP.AUTO: 1.01
TRIGL SERPL-MCNC: 114 MG/DL (ref 0–149)
UROBILINOGEN UR STRIP.AUTO-MCNC: 0.2 MG/DL
WBC #/AREA URNS HPF: ABNORMAL /HPF

## 2019-10-17 PROCEDURE — 81001 URINALYSIS AUTO W/SCOPE: CPT

## 2019-10-17 PROCEDURE — 80061 LIPID PANEL: CPT

## 2019-10-17 PROCEDURE — 82570 ASSAY OF URINE CREATININE: CPT

## 2019-10-17 PROCEDURE — 80048 BASIC METABOLIC PNL TOTAL CA: CPT

## 2019-10-17 PROCEDURE — 82043 UR ALBUMIN QUANTITATIVE: CPT

## 2019-10-17 PROCEDURE — 36415 COLL VENOUS BLD VENIPUNCTURE: CPT

## 2019-10-21 ENCOUNTER — TELEPHONE (OUTPATIENT)
Dept: CARDIOLOGY | Facility: MEDICAL CENTER | Age: 74
End: 2019-10-21

## 2019-10-21 DIAGNOSIS — E11.69 HYPERLIPIDEMIA ASSOCIATED WITH TYPE 2 DIABETES MELLITUS (HCC): ICD-10-CM

## 2019-10-21 DIAGNOSIS — E78.5 HYPERLIPIDEMIA ASSOCIATED WITH TYPE 2 DIABETES MELLITUS (HCC): ICD-10-CM

## 2019-10-21 RX ORDER — ATORVASTATIN CALCIUM 80 MG/1
80 TABLET, FILM COATED ORAL
Qty: 90 TAB | Refills: 3 | Status: SHIPPED | OUTPATIENT
Start: 2019-10-21 | End: 2020-01-22

## 2019-10-21 NOTE — TELEPHONE ENCOUNTER
Noted stable renal function and electrolytes, but inadequate control of cholesterol. Please have him increase lipitor to 80mg once a day. Thanks!

## 2019-10-21 NOTE — TELEPHONE ENCOUNTER
Called pt, discussed labs per Dr Tucker and his recommendations, pt agreed and verbalizes understanding    New Rx for Lipitor 80mg daily sent to [facility] per pt's request

## 2019-10-24 ENCOUNTER — TELEPHONE (OUTPATIENT)
Dept: NEPHROLOGY | Facility: MEDICAL CENTER | Age: 74
End: 2019-10-24

## 2019-10-24 ENCOUNTER — OFFICE VISIT (OUTPATIENT)
Dept: NEPHROLOGY | Facility: MEDICAL CENTER | Age: 74
End: 2019-10-24
Payer: MEDICARE

## 2019-10-24 VITALS
WEIGHT: 205 LBS | OXYGEN SATURATION: 97 % | TEMPERATURE: 97.3 F | DIASTOLIC BLOOD PRESSURE: 66 MMHG | HEIGHT: 72 IN | SYSTOLIC BLOOD PRESSURE: 112 MMHG | HEART RATE: 67 BPM | RESPIRATION RATE: 16 BRPM | BODY MASS INDEX: 27.77 KG/M2

## 2019-10-24 DIAGNOSIS — I10 ESSENTIAL HYPERTENSION: ICD-10-CM

## 2019-10-24 DIAGNOSIS — N18.30 CKD (CHRONIC KIDNEY DISEASE), STAGE III (HCC): ICD-10-CM

## 2019-10-24 DIAGNOSIS — N20.0 NEPHROLITHIASIS: ICD-10-CM

## 2019-10-24 DIAGNOSIS — R80.9 MICROALBUMINURIA DUE TO TYPE 2 DIABETES MELLITUS (HCC): ICD-10-CM

## 2019-10-24 DIAGNOSIS — N39.0 URINARY TRACT INFECTION WITHOUT HEMATURIA, SITE UNSPECIFIED: ICD-10-CM

## 2019-10-24 DIAGNOSIS — E11.29 MICROALBUMINURIA DUE TO TYPE 2 DIABETES MELLITUS (HCC): ICD-10-CM

## 2019-10-24 PROCEDURE — 99214 OFFICE O/P EST MOD 30 MIN: CPT | Performed by: INTERNAL MEDICINE

## 2019-10-24 ASSESSMENT — ENCOUNTER SYMPTOMS
HEMOPTYSIS: 0
WEIGHT LOSS: 0
PALPITATIONS: 0
COUGH: 0
VOMITING: 0
ABDOMINAL PAIN: 0
SHORTNESS OF BREATH: 0
SINUS PAIN: 0
EYES NEGATIVE: 1
NAUSEA: 0
FLANK PAIN: 0
WHEEZING: 0
CHILLS: 0
DIARRHEA: 0
FEVER: 0
ORTHOPNEA: 0

## 2019-10-24 NOTE — PROGRESS NOTES
Subjective:      Av Bob is a 74 y.o. male who presents with Follow-Up and Chronic Kidney Disease            Chronic Kidney Disease   Pertinent negatives include no abdominal pain, chest pain, chills, congestion, coughing, fever, nausea or vomiting.     Av is coming today for f/u of CKD III, microalbuminuria  Hx/of nephrolithiasis -well controlled -f/u with Urology  Creat level stable at 1.5 -baseline  C/o recent UTI -treated with Bactrim  Doing better , no complaints  No difficulties to urinate.No dysuria No flank pain  HTN: BP well controlled    Review of Systems   Constitutional: Negative for chills, fever, malaise/fatigue and weight loss.   HENT: Negative for congestion, hearing loss and sinus pain.    Eyes: Negative.    Respiratory: Negative for cough, hemoptysis, shortness of breath and wheezing.    Cardiovascular: Negative for chest pain, palpitations, orthopnea and leg swelling.   Gastrointestinal: Negative for abdominal pain, diarrhea, nausea and vomiting.   Genitourinary: Negative for dysuria, flank pain, frequency, hematuria and urgency.   Skin: Negative.    All other systems reviewed and are negative.    Past Medical History:   Diagnosis Date   • Acute respiratory failure with hypoxia ([facility]) 5/20/2017   • CAD (coronary artery disease)     GIOVANNA to RCA in '97, GIOVANNA X2 to LAD and GIOVANNA X2 to OM in '09   • Cancer ([facility])     2017; chemo lympoma   • Cataract    • Cerebrovascular accident (CVA) ([facility]) 12/30/2016    Left arm weakness  etiology of stroke not established, lymphoma discovered on MRI evaluation of stroke, L hemiparesis much worse after acute infectious illness in mid 2017, but no specific diagnosed recurrent neurological etiology, all at [facility]   • CKD (chronic kidney disease) stage 3, GFR 30-59 ml/min ([facility])    • Controlled gout 2014   • Coronary atherosclerosis of native coronary artery     S/P PTCA (percutaneous transluminal coronary angioplasty),  RCA, 5/1997, patent on cath 7/10/2009 at the time of interventions on his left anterior descending and circumflex coronary arteries   • Depression    • Diabetes ([facility])    • Enterococcal septicemia ([facility]) 8/12/2017   • Hypertension    • Hypokalemia 2012    controlled with combination of ACE inhibitor or ARB plus spironolactone   • Hypomagnesemia 08/12/2017    etiology uncertain   • Lymphoma ([facility]) 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 Barry   • NSTEMI (non-ST elevated myocardial infarction) ([facility]) 07/18/2017    complicating UTI with sepsis   • Pain    • Polyneuropathy in diabetes(357.2) 9/11/2013   • Septic shock ([facility]) 5/20/2017   • Skin ulcer of calf ([facility]) 2015    Right, Dr. Terry and wound care   • Stroke ([facility]) 2016    left sided weakness   • Urinary bladder disorder    • Urinary incontinence    • Wound of left leg 2012    Requiring surgery and debridment, Dr. Moore       Family History   Problem Relation Age of Onset   • Heart Disease Father         CAD   • Diabetes Father    • Cancer Mother    • Psychiatric Illness Mother         Depression   • Depression Mother    • Kidney stones Brother    • Heart Disease Brother    • Psychiatric Illness Brother         Depression   • Depression Brother    • Suicide Attempts Other    • Psychiatric Illness Other         autism       Social History     Socioeconomic History   • Marital status:      Spouse name: Not on file   • Number of children: Not on file   • Years of education: Not on file   • Highest education level: Not on file   Occupational History   • Not on file   Social Needs   • Financial resource strain: Not on file   • Food insecurity:     Worry: Not on file     Inability: Not on file   • Transportation needs:     Medical: Not on file     Non-medical: Not on file   Tobacco Use   • Smoking status: Never Smoker   • Smokeless tobacco: Never Used   Substance and Sexual Activity   •  Alcohol use: No   • Drug use: No   • Sexual activity: Not Currently     Partners: Female   Lifestyle   • Physical activity:     Days per week: Not on file     Minutes per session: Not on file   • Stress: Not on file   Relationships   • Social connections:     Talks on phone: Not on file     Gets together: Not on file     Attends Mormonism service: Not on file     Active member of club or organization: Not on file     Attends meetings of clubs or organizations: Not on file     Relationship status: Not on file   • Intimate partner violence:     Fear of current or ex partner: Not on file     Emotionally abused: Not on file     Physically abused: Not on file     Forced sexual activity: Not on file   Other Topics Concern   • Not on file   Social History Narrative   • Not on file          Objective:     /66 (BP Location: Right arm, Patient Position: Sitting, BP Cuff Size: Adult)   Pulse 67 Comment: 69  Temp 36.3 °C (97.3 °F) (Temporal)   Resp 16   Ht 1.829 m (6')   Wt 93 kg (205 lb) Comment: Pt stated wt. He is in a wheelchair.  SpO2 97%   BMI 27.80 kg/m²          Physical Exam   Constitutional: He is oriented to person, place, and time. He appears well-developed and well-nourished. No distress.   HENT:   Head: Normocephalic and atraumatic.   Nose: Nose normal.   Mouth/Throat: Oropharynx is clear and moist.   Eyes: Pupils are equal, round, and reactive to light. Conjunctivae and EOM are normal.   Neck: Normal range of motion. Neck supple. No thyromegaly present.   Cardiovascular: Normal rate and regular rhythm. Exam reveals no gallop and no friction rub.   Pulmonary/Chest: Effort normal and breath sounds normal. No respiratory distress. He has no wheezes. He has no rales.   Abdominal: Soft. Bowel sounds are normal. He exhibits no distension and no mass. There is no tenderness.   Musculoskeletal: He exhibits no edema.   Neurological: He is alert and oriented to person, place, and time.   Skin: Skin is warm. No  rash noted. He is not diaphoretic. No erythema.   Vitals reviewed.         Laboratory results reviewed: d/w pt    Lab Results   Component Value Date/Time    CREATININE 1.56 (H) 10/17/2019 12:21 PM    CREATININE 1.4 05/28/2008 05:42 PM    POTASSIUM 3.6 10/17/2019 12:21 PM     Assessment and Plan    1.CKD III-creat stable at baseline -to monitor  2.HTN: BP well controlled -to monitor  3.Electrolytes: well controlled.  4.Anemia: Hb stable  5.Recurrent UTI -to complete urine culture, f/u with ID  6.Volume:well controlled  7.Microalbuminurie due to DM II -slightly worse -to monitor closely  8.Vit D def: vit D and PTH well controlled -WNL    Recs: continue current treatment             Keep well hydrated             Low Na diet             Complete urine culture and call clinic to discuss results             F/u in ID clinic             F/u here in 3 months

## 2019-10-24 NOTE — TELEPHONE ENCOUNTER
Patient wife called they needs a referral for Infectious disease since its been over a year   Thank you

## 2019-10-26 ENCOUNTER — HOSPITAL ENCOUNTER (OUTPATIENT)
Dept: LAB | Facility: MEDICAL CENTER | Age: 74
End: 2019-10-26
Attending: INTERNAL MEDICINE
Payer: MEDICARE

## 2019-10-26 DIAGNOSIS — N39.0 URINARY TRACT INFECTION WITHOUT HEMATURIA, SITE UNSPECIFIED: ICD-10-CM

## 2019-10-26 PROCEDURE — 87186 SC STD MICRODIL/AGAR DIL: CPT

## 2019-10-26 PROCEDURE — 87077 CULTURE AEROBIC IDENTIFY: CPT

## 2019-10-26 PROCEDURE — 87086 URINE CULTURE/COLONY COUNT: CPT

## 2019-10-31 ENCOUNTER — OFFICE VISIT (OUTPATIENT)
Dept: INFECTIOUS DISEASES | Facility: MEDICAL CENTER | Age: 74
End: 2019-10-31
Payer: MEDICARE

## 2019-10-31 VITALS
OXYGEN SATURATION: 98 % | SYSTOLIC BLOOD PRESSURE: 116 MMHG | BODY MASS INDEX: 27.77 KG/M2 | HEIGHT: 72 IN | HEART RATE: 78 BPM | DIASTOLIC BLOOD PRESSURE: 60 MMHG | TEMPERATURE: 97 F | WEIGHT: 205 LBS

## 2019-10-31 DIAGNOSIS — E11.3293 CONTROLLED TYPE 2 DIABETES MELLITUS WITH BOTH EYES AFFECTED BY MILD NONPROLIFERATIVE RETINOPATHY WITHOUT MACULAR EDEMA, WITHOUT LONG-TERM CURRENT USE OF INSULIN (HCC): ICD-10-CM

## 2019-10-31 DIAGNOSIS — N18.30 CKD (CHRONIC KIDNEY DISEASE) STAGE 3, GFR 30-59 ML/MIN (HCC): ICD-10-CM

## 2019-10-31 DIAGNOSIS — N39.0 RECURRENT UTI: Primary | ICD-10-CM

## 2019-10-31 PROCEDURE — 99214 OFFICE O/P EST MOD 30 MIN: CPT | Performed by: INTERNAL MEDICINE

## 2019-10-31 RX ORDER — AMOXICILLIN AND CLAVULANATE POTASSIUM 875; 125 MG/1; MG/1
1 TABLET, FILM COATED ORAL 2 TIMES DAILY
Refills: 0 | Status: CANCELLED | OUTPATIENT
Start: 2019-10-31

## 2019-10-31 RX ORDER — AMOXICILLIN AND CLAVULANATE POTASSIUM 500; 125 MG/1; MG/1
1 TABLET, FILM COATED ORAL 2 TIMES DAILY
Qty: 14 TAB | Refills: 0 | Status: SHIPPED | OUTPATIENT
Start: 2019-10-31 | End: 2019-11-07

## 2019-10-31 ASSESSMENT — ENCOUNTER SYMPTOMS
ABDOMINAL PAIN: 0
NAUSEA: 1
FLANK PAIN: 0
COUGH: 0
HEADACHES: 0
CHILLS: 1
FOCAL WEAKNESS: 1
DIZZINESS: 0
FEVER: 0
VOMITING: 1
BACK PAIN: 0
DIARRHEA: 1
SHORTNESS OF BREATH: 0

## 2019-10-31 NOTE — PROGRESS NOTES
Infectious Disease Follow up Note      Subjective:     Chief Complaint   Patient presents with   • Follow-Up     Recurrent UTI         Interval History:   74-year-old man with history of coronary artery disease, CVA, controlled type 2 diabetes mellitus and recurrent urinary tract infections presents to the infectious disease clinic today for a recurrent UTI.  Patient has a known history of urinary retention and has followed up with Dr. Barry of urology in the past.    Patient here for follow-up.  In September, he noted foul-smelling urine.  Patient has urinary incontinence and never has any dysuria or increased urinary frequency when he has his urinary tract infections.  He was seen at an urgent care on 9/19/2019 and a urinalysis was consistent with infection.  Urine culture grew Klebsiella pneumoniae.  He was treated with a seven-day course of Bactrim.  The odor eventually resolved.  However after completion of antibiotics, he developed diarrhea and recurrence of malodorous urine.  He denies any flank pain.  No fevers but over the last several days, he has been experiencing chills.  He has also had some nausea and vomiting in the last 24 hours.  He was seen by his nephrologist (Dr. Jarvis) for follow-up on 10/24/2019 and given recurrence of symptoms, a urine culture was obtained and is positive again for Klebsiella pneumonia.  Of note, patient's wife states he has been having bowel incontinence recently and is concerned that fecal contamination may be causing recurrent UTIs.    Hospital records reviewed    Review of Systems   Constitutional: Positive for chills and malaise/fatigue. Negative for fever.   Respiratory: Negative for cough and shortness of breath.    Gastrointestinal: Positive for diarrhea, nausea and vomiting. Negative for abdominal pain.   Genitourinary: Negative for dysuria, flank pain, frequency, hematuria and urgency.        Foul-smelling urine   Musculoskeletal: Negative for back pain.    Neurological: Positive for focal weakness. Negative for dizziness and headaches.   All other systems reviewed and are negative.      Past Medical History:   Diagnosis Date   • Acute respiratory failure with hypoxia ([facility]) 5/20/2017   • CAD (coronary artery disease)     GIOVANNA to RCA in '97, GIOVANNA X2 to LAD and GIOVANNA X2 to OM in '09   • Cancer ([facility])     2017; chemo lympoma   • Cataract    • Cerebrovascular accident (CVA) ([facility]) 12/30/2016    Left arm weakness  etiology of stroke not established, lymphoma discovered on MRI evaluation of stroke, L hemiparesis much worse after acute infectious illness in mid 2017, but no specific diagnosed recurrent neurological etiology, all at [facility]   • CKD (chronic kidney disease) stage 3, GFR 30-59 ml/min ([facility])    • Controlled gout 2014   • Coronary atherosclerosis of native coronary artery     S/P PTCA (percutaneous transluminal coronary angioplasty), RCA, 5/1997, patent on cath 7/10/2009 at the time of interventions on his left anterior descending and circumflex coronary arteries   • Depression    • Diabetes ([facility])    • Enterococcal septicemia ([facility]) 8/12/2017   • Hypertension    • Hypokalemia 2012    controlled with combination of ACE inhibitor or ARB plus spironolactone   • Hypomagnesemia 08/12/2017    etiology uncertain   • Lymphoma ([facility]) 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 Barry   • NSTEMI (non-ST elevated myocardial infarction) ([facility]) 07/18/2017    complicating UTI with sepsis   • Pain    • Polyneuropathy in diabetes(357.2) 9/11/2013   • Septic shock ([facility]) 5/20/2017   • Skin ulcer of calf ([facility]) 2015    Right, Dr. Terry and wound care   • Stroke ([facility]) 2016    left sided weakness   • Urinary bladder disorder    • Urinary incontinence    • Wound of left leg 2012    Requiring surgery and debridment, Dr. Moore       Past Surgical History:   Procedure Laterality Date    • CYSTOSCOPY STENT PLACEMENT Left 2/12/2018    Procedure: CYSTOSCOPY  ;  Surgeon: Rey Barry M.D.;  Location: SURGERY [facility];  Service: Urology   • URETEROSCOPY Left 2/12/2018    Procedure: URETEROSCOPY- FLEXIBLE  ;  Surgeon: Rey Barry M.D.;  Location: SURGERY [facility];  Service: Urology   • LASERTRIPSY Left 2/12/2018    Procedure: LASERTRIPSY - LITHO  ;  Surgeon: Rey Barry M.D.;  Location: SURGERY [facility];  Service: Urology   • WOUND CLOSURE GENERAL  4/3/2012    Performed by GERHARD GILBERT at SURGERY SAME DAY [facility] ORS   • Z CARDIAC CATH  2009    Stents to LAD, Om   • DAGOBERTO BY LAPAROSCOPY  1998   • ANGIOPLASTY  1997    RCA followed by other stents as noted above.    • ZZZ CARDIAC CATH  1997    stent RCA   • CATARACT EXTRACTION WITH IOL      bilateral   • LITHOTRIPSY     • TONSILLECTOMY AND ADENOIDECTOMY         Allergies:   Allergies   Allergen Reactions   • Diphenhydramine Hcl Anxiety     Pt is able to tolerate  Mg benadryl with less anxiety   • Lorazepam Unspecified     Disorientation   • Ciprofloxacin      Rash,stomach ache   • Spironolactone      Acute kidney injury         Medications:  Current Outpatient Medications on File Prior to Visit   Medication Sig Dispense Refill   • fenofibrate (TRICOR) 145 MG Tab Take 1 Tab by mouth every day. 90 Tab 3   • atorvastatin (LIPITOR) 80 MG tablet Take 1 Tab by mouth every day. 90 Tab 3   • losartan (COZAAR) 50 MG Tab Take 1 Tab by mouth every day. 90 Tab 3   • allopurinol (ZYLOPRIM) 100 MG Tab Take 1 Tab by mouth every day. 90 Tab 0   • clopidogrel (PLAVIX) 75 MG Tab TAKE 1 TABLET BY MOUTH EVERY DAY 90 Tab 3   • Dulaglutide (TRULICITY) 1.5 MG/0.5ML Solution Pen-injector Inject 1.5 mg as instructed every 7 days. On Tue 12 PEN 3   • magnesium oxide (MAG-OX) 400 (241.3 Mg) MG Tab tablet Take 800 mg by mouth 2 times a day.     • Insulin Glargine (TOUJEO MAX SOLOSTAR) 300 UNIT/ML Solution Pen-injector Inject 40 Units as instructed  every bedtime.     • insulin lispro, Human, (HUMALOG KWIKPEN) 100 UNIT/ML Solution Pen-injector injection Inject 5-9 Units as instructed 3 times a day before meals. Sliding Scale     • fluoxetine (PROZAC) 40 MG capsule TAKE 1 CAPSULE BY MOUTH EVERY DAY 90 Cap 2   • potassium chloride (KLOR-CON) 8 MEQ tablet Take 1 Tab by mouth every day. 90 Tab 3   • metoprolol (TOPROL-XL) 200 MG XL tablet Take 1 Tab by mouth every evening. 90 Tab 3   • DAILY MULTIPLE VITAMINS PO Take 1 Tab by mouth every day.     • acetaminophen (TYLENOL) 500 MG Tab Take 1,000 mg by mouth every 6 hours as needed for Mild Pain.     • ascorbic acid (VITAMIN C) 500 MG tablet Take 500 mg by mouth every day.     • Cholecalciferol (VITAMIN D) 2000 units Cap Take 2,000 Units by mouth 2 Times a Day.     • aspirin 81 MG tablet Take 81 mg by mouth every day.       No current facility-administered medications on file prior to visit.          ROS  As documented above in my HPI       Objective:     PE:  /60 (BP Location: Right arm, Patient Position: Sitting, BP Cuff Size: Adult)   Pulse 78   Temp 36.1 °C (97 °F) (Temporal)   Ht 1.829 m (6')   Wt 93 kg (205 lb)   SpO2 98%   BMI 27.80 kg/m²      Vital signs reviewed  Constitutional: patient is oriented to person, place, and time. Appears well-developed and well-nourished. No distress  Eyes: Conjunctivae normal and EOM are normal. Pupils are equal, round, and reactive to light.  Arrives in wheelchair.  Mouth/Throat: Lips without lesions, good dentition, oropharynx is clear and moist.  Neck: Trachea midline. Normal range of motion. Neck supple. No masses  Cardiovascular: Normal rate, regular rhythm, normal heart sounds and intact distal pulses. No murmur, gallop, or friction rub. No edema.  Pulmonary/Chest: No respiratory distress. Unlabored respiratory effort, lungs clear to auscultation. No wheezes or rales.   Abdominal: Soft, non tender. BS + x 4. No masses or hepatosplenomegaly.   Musculoskeletal:  Normal range of motion. No tenderness, swelling, erythema, deformity noted.  Neurological: alert and oriented to person, place, and time. No cranial nerve deficit. Coordination normal. Moves all extremities  Skin: Skin is warm and dry. Good turgor. No rashes visable.  Psychiatric: Normal mood and affect. Behavior is normal.  Pleasant    LABS:  WBC   Date/Time Value Ref Range Status   07/01/2019 04:48 AM 9.3 4.8 - 10.8 K/uL Final     RBC   Date/Time Value Ref Range Status   07/01/2019 04:48 AM 4.66 (L) 4.70 - 6.10 M/uL Final     Hemoglobin   Date/Time Value Ref Range Status   07/01/2019 04:48 AM 13.6 (L) 14.0 - 18.0 g/dL Final     Hematocrit   Date/Time Value Ref Range Status   07/01/2019 04:48 AM 41.0 (L) 42.0 - 52.0 % Final     MCV   Date/Time Value Ref Range Status   07/01/2019 04:48 AM 88.0 81.4 - 97.8 fL Final     MCH   Date/Time Value Ref Range Status   07/01/2019 04:48 AM 29.2 27.0 - 33.0 pg Final     MCHC   Date/Time Value Ref Range Status   07/01/2019 04:48 AM 33.2 (L) 33.7 - 35.3 g/dL Final     MPV   Date/Time Value Ref Range Status   07/01/2019 04:48 AM 10.1 9.0 - 12.9 fL Final        Sodium   Date/Time Value Ref Range Status   10/17/2019 12:21  135 - 145 mmol/L Final     Potassium   Date/Time Value Ref Range Status   10/17/2019 12:21 PM 3.6 3.6 - 5.5 mmol/L Final     Chloride   Date/Time Value Ref Range Status   10/17/2019 12:21  96 - 112 mmol/L Final     Co2   Date/Time Value Ref Range Status   10/17/2019 12:21 PM 26 20 - 33 mmol/L Final     Glucose   Date/Time Value Ref Range Status   10/17/2019 12:21  (H) 65 - 99 mg/dL Final     Bun   Date/Time Value Ref Range Status   10/17/2019 12:21 PM 26 (H) 8 - 22 mg/dL Final     Creatinine   Date/Time Value Ref Range Status   10/17/2019 12:21 PM 1.56 (H) 0.50 - 1.40 mg/dL Final   05/28/2008 05:42 PM 1.4 0.5 - 1.4 mg/dL Final     Bun-Creatinine Ratio   Date/Time Value Ref Range Status   03/27/2017 02:11 PM 10 10 - 22 Final     Comment:      **Effective April 3, 2017 BUN/Creatinine Ratio**  reference interval will be changing to:  Age                  Male          Female  0 days   -  7 days          9 - 25         9 - 26  8 days   - 30 days          8 - 32        10 - 33  1 month  -  6 months       11 - 57        11 - 54  7 months -  1 year         20 - 71        20 - 71  2 years  -  5 years        19 - 51        19 - 49  6 years  - 12 years        14 - 34        13 - 32  13 years  - 17 years        10 - 22        10 - 22  18 years  - 59 years         9 - 20         9 - 23  >59 years        10 - 24        12 - 28       Alkaline Phosphatase   Date/Time Value Ref Range Status   07/26/2019 12:45 PM 53 30 - 99 U/L Final     AST(SGOT)   Date/Time Value Ref Range Status   07/26/2019 12:45 PM 24 12 - 45 U/L Final     ALT(SGPT)   Date/Time Value Ref Range Status   07/26/2019 12:45 PM 20 2 - 50 U/L Final     Total Bilirubin   Date/Time Value Ref Range Status   07/26/2019 12:45 PM 0.6 0.1 - 1.5 mg/dL Final        No results found for: CPKTOTAL     MICRO:  No results found for: BLOODCULTU, BLDCULT, BCHOLD       IMAGING STUDIES:  none    Assessment/Plan:     Problem List Items Addressed This Visit     (HCC) 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, without long-term current use of insulin.  Well-controlled with last hemoglobin A1c 6.2.  Fasting glucose today was 70 per patient.  Continue diabetic medications per primary care physician.    Recurrent UTI - Primary.  Repeat urine culture on 10/24/2019 with Klebsiella pneumonia.  Recently treated with Bactrim.  Given stage III CKD, will avoid Bactrim and treat patient with Augmentin 500 mg twice daily for 7 days.  Cannot use ciprofloxacin secondary to drug allergy.  If patient's symptoms continue after treatment, patient advised to follow-up with urology for cystoscopy given patient's history of urinary retention.      Other Visit Diagnoses      CKD (chronic kidney disease) stage 3, GFR 30-59 ml/min ([facility]).  Stable.  Renally dose antibiotics as per above.  Follow-up with nephrology as scheduled          Follow up: As needed, RTC sooner if needed. FU with PCP for ongoing chronic medical conditions.     Madisyn Handy M.D.

## 2019-11-04 ENCOUNTER — TELEPHONE (OUTPATIENT)
Dept: INFECTIOUS DISEASES | Facility: MEDICAL CENTER | Age: 74
End: 2019-11-04

## 2019-11-04 DIAGNOSIS — R19.7 DIARRHEA OF PRESUMED INFECTIOUS ORIGIN: ICD-10-CM

## 2019-11-04 NOTE — TELEPHONE ENCOUNTER
Pt's wife called to report that pt had about 6 bouts of diarrhea yesterday (11/03/19) starting in the afternoon until midnight. Pt also vomited around midnight. Bowel movements described as very loose/liquid, but not odorous. Pt took last nights dose of Augmentin around 8:30 pm. Pt has been taking a probiotic. No bowel movements since last night, pt was sleeping when wife called.   Per Dr. Garcia if the diarrhea continues tomorrow, have a c. Diff test run. Order placed.  Called and spoke to wife, pt had another bout of diarrhea this morning, but nothing since. Pt is generally feeling better and took Tylenol last night. Informed wife of what Dr. Tamez said. She verbalized understand and said they would comply.  -AMP

## 2019-11-10 DIAGNOSIS — M1A.09X0 IDIOPATHIC CHRONIC GOUT OF MULTIPLE SITES WITHOUT TOPHUS: ICD-10-CM

## 2019-11-11 RX ORDER — ALLOPURINOL 100 MG/1
TABLET ORAL
Qty: 90 TAB | Refills: 0 | Status: SHIPPED | OUTPATIENT
Start: 2019-11-11 | End: 2020-01-22

## 2019-11-18 ENCOUNTER — TELEPHONE (OUTPATIENT)
Dept: INFECTIOUS DISEASES | Facility: MEDICAL CENTER | Age: 74
End: 2019-11-18

## 2019-11-18 DIAGNOSIS — N39.0 RECURRENT UTI: ICD-10-CM

## 2019-11-18 NOTE — TELEPHONE ENCOUNTER
Pt's wife called concerned that pt's UTI has come back. Pt finished abx on 11/07/19. Pt has been feeling weak/tired, low appetite, increased urine odor, and vomited last night (11/17/19). They want to know if any urine tests need to be run.  Dr. Dick ordered a urine culture. Pt was also supposed to see a urologist, check if done.  Called back pt and explained above. Pt will call urologist to schedule appointment.   -AMP

## 2019-11-19 ENCOUNTER — HOSPITAL ENCOUNTER (OUTPATIENT)
Dept: LAB | Facility: MEDICAL CENTER | Age: 74
End: 2019-11-19
Attending: PHYSICIAN ASSISTANT
Payer: MEDICARE

## 2019-11-19 PROCEDURE — 87086 URINE CULTURE/COLONY COUNT: CPT

## 2019-11-19 PROCEDURE — 87186 SC STD MICRODIL/AGAR DIL: CPT

## 2019-11-19 PROCEDURE — 87077 CULTURE AEROBIC IDENTIFY: CPT

## 2019-12-02 ENCOUNTER — OFFICE VISIT (OUTPATIENT)
Dept: MEDICAL GROUP | Facility: MEDICAL CENTER | Age: 74
End: 2019-12-02
Payer: MEDICARE

## 2019-12-02 VITALS
HEIGHT: 72 IN | RESPIRATION RATE: 16 BRPM | WEIGHT: 205 LBS | TEMPERATURE: 96.9 F | HEART RATE: 64 BPM | SYSTOLIC BLOOD PRESSURE: 114 MMHG | BODY MASS INDEX: 27.77 KG/M2 | DIASTOLIC BLOOD PRESSURE: 64 MMHG | OXYGEN SATURATION: 98 %

## 2019-12-02 DIAGNOSIS — Z76.89 ENCOUNTER TO ESTABLISH CARE: ICD-10-CM

## 2019-12-02 DIAGNOSIS — Z23 INFLUENZA VACCINE NEEDED: ICD-10-CM

## 2019-12-02 DIAGNOSIS — N39.0 RECURRENT UTI: ICD-10-CM

## 2019-12-02 DIAGNOSIS — E11.3293 CONTROLLED TYPE 2 DIABETES MELLITUS WITH BOTH EYES AFFECTED BY MILD NONPROLIFERATIVE RETINOPATHY WITHOUT MACULAR EDEMA, WITHOUT LONG-TERM CURRENT USE OF INSULIN (HCC): ICD-10-CM

## 2019-12-02 PROBLEM — M24.851 SNAPPING HIP SYNDROME, RIGHT: Status: RESOLVED | Noted: 2019-05-20 | Resolved: 2019-12-02

## 2019-12-02 PROBLEM — S76.311A STRAIN OF RIGHT HAMSTRING: Status: RESOLVED | Noted: 2019-05-20 | Resolved: 2019-12-02

## 2019-12-02 PROBLEM — R19.7 DIARRHEA: Status: RESOLVED | Noted: 2019-07-02 | Resolved: 2019-12-02

## 2019-12-02 PROCEDURE — 90662 IIV NO PRSV INCREASED AG IM: CPT | Performed by: PHYSICIAN ASSISTANT

## 2019-12-02 PROCEDURE — 99214 OFFICE O/P EST MOD 30 MIN: CPT | Mod: 25 | Performed by: PHYSICIAN ASSISTANT

## 2019-12-02 PROCEDURE — G0008 ADMIN INFLUENZA VIRUS VAC: HCPCS | Performed by: PHYSICIAN ASSISTANT

## 2019-12-02 RX ORDER — SACCHAROMYCES BOULARDII 250 MG
250 CAPSULE ORAL
COMMUNITY
End: 2020-01-26

## 2019-12-02 NOTE — ASSESSMENT & PLAN NOTE
Chronic condition.  Stable.  Using insulin daily for diabetes.  Recent A1c at 6.2.  Has an appointment in either January or February with retinal specialist.

## 2019-12-02 NOTE — ASSESSMENT & PLAN NOTE
This is a 74-year-old male accompanied by his wife, Usha.  He is here today to establish care.  I have seen him in the past.  He has several chronic medical conditions.  Most concerning one is a chronic history of recurrent UTIs.  Is followed by urology, ID and nephrology.  Currently on a regimen of antibiotics provided by urology.  Also taking d-mannose.  Has no concerns today regarding his UTI.

## 2019-12-02 NOTE — PROGRESS NOTES
Subjective:   Av Bob is a 74 y.o. male here today for establishing care, chronic history of recurrent UTIs, controlled diabetes and need for influenza.    Recurrent UTI  This is a 74-year-old male accompanied by his wife, Usha.  He is here today to establish care.  I have seen him in the past.  He has several chronic medical conditions.  Most concerning one is a chronic history of recurrent UTIs.  Is followed by urology, ID and nephrology.  Currently on a regimen of antibiotics provided by urology.  Also taking d-mannose.  Has no concerns today regarding his UTI.    ([facility]) 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, without long-term current use of insulin  Chronic condition.  Stable.  Using insulin daily for diabetes.  Recent A1c at 6.2.  Has an appointment in either January or February with retinal specialist.       Current medicines (including changes today)  Current Outpatient Medications   Medication Sig Dispense Refill   • D-MANNOSE PO Take 1 tablet by mouth.     • saccharomyces boulardii (FLORASTOR) 250 MG Cap Take 250 mg by mouth 3 times a day, with meals.     • allopurinol (ZYLOPRIM) 100 MG Tab TAKE 1 TABLET BY MOUTH EVERY DAY 90 Tab 0   • fenofibrate (TRICOR) 145 MG Tab Take 1 Tab by mouth every day. 90 Tab 3   • atorvastatin (LIPITOR) 80 MG tablet Take 1 Tab by mouth every day. 90 Tab 3   • losartan (COZAAR) 50 MG Tab Take 1 Tab by mouth every day. 90 Tab 3   • clopidogrel (PLAVIX) 75 MG Tab TAKE 1 TABLET BY MOUTH EVERY DAY 90 Tab 3   • Dulaglutide (TRULICITY) 1.5 MG/0.5ML Solution Pen-injector Inject 1.5 mg as instructed every 7 days. On Tue 12 PEN 3   • magnesium oxide (MAG-OX) 400 (241.3 Mg) MG Tab tablet Take 800 mg by mouth 2 times a day.     • Insulin Glargine (TOUJEO MAX SOLOSTAR) 300 UNIT/ML Solution Pen-injector Inject 40 Units as instructed every bedtime.     • insulin lispro, Human, (HUMALOG KWIKPEN) 100  UNIT/ML Solution Pen-injector injection Inject 5-9 Units as instructed 3 times a day before meals. Sliding Scale     • fluoxetine (PROZAC) 40 MG capsule TAKE 1 CAPSULE BY MOUTH EVERY DAY 90 Cap 2   • potassium chloride (KLOR-CON) 8 MEQ tablet Take 1 Tab by mouth every day. 90 Tab 3   • metoprolol (TOPROL-XL) 200 MG XL tablet Take 1 Tab by mouth every evening. 90 Tab 3   • DAILY MULTIPLE VITAMINS PO Take 1 Tab by mouth every day.     • acetaminophen (TYLENOL) 500 MG Tab Take 1,000 mg by mouth every 6 hours as needed for Mild Pain.     • ascorbic acid (VITAMIN C) 500 MG tablet Take 500 mg by mouth every day.     • Cholecalciferol (VITAMIN D) 2000 units Cap Take 2,000 Units by mouth 2 Times a Day.     • aspirin 81 MG tablet Take 81 mg by mouth every day.       No current facility-administered medications for this visit.      He  has a past medical history of Acute respiratory failure with hypoxia ([facility]) (5/20/2017), CAD (coronary artery disease), Cancer ([facility]), Cataract, Cerebrovascular accident (CVA) ([facility]) (12/30/2016), CKD (chronic kidney disease) stage 3, GFR 30-59 ml/min ([facility]), Controlled gout (2014), Coronary atherosclerosis of native coronary artery, Depression, Diabetes ([facility]), Enterococcal septicemia ([facility]) (8/12/2017), Hypertension, Hypokalemia (2012), Hypomagnesemia (08/12/2017), Lymphoma ([facility]) (2/19/2017), Mixed hyperlipidemia, Nephrolithiasis (2006), NSTEMI (non-ST elevated myocardial infarction) ([facility]) (07/18/2017), Pain, Polyneuropathy in diabetes(357.2) (9/11/2013), Septic shock ([facility]) (5/20/2017), Skin ulcer of calf ([facility]) (2015), Stroke ([facility]) (2016), Urinary bladder disorder, Urinary incontinence, and Wound of left leg (2012). He also has no past medical history of Suicide attempt ([facility]).    Social History and Family History were reviewed and updated.    ROS   No chest pain, no shortness of breath, no abdominal pain and all other systems were reviewed and are negative.       Objective:     /64 (BP  Location: Left arm, Patient Position: Sitting, BP Cuff Size: Adult)   Pulse 64   Temp 36.1 °C (96.9 °F) (Temporal)   Resp 16   Ht 1.829 m (6')   SpO2 98%  Body mass index is 27.8 kg/m².   Physical Exam:  Constitutional: Alert, no distress.  Skin: Warm, dry, good turgor, no rashes in visible areas.  Eye: Equal, round and reactive, conjunctiva clear, lids normal.  ENMT: Lips without lesions, good dentition, oropharynx clear.  Neck: Trachea midline, no masses.   Lymph: No cervical or supraclavicular lymphadenopathy  Respiratory: Unlabored respiratory effort, lungs appear clear, no wheezes.  Cardiovascular: Regular rate and rhythm, no edema.  Psych: Alert and oriented x3, normal affect and mood.        Assessment and Plan:   The following treatment plan was discussed    1. Recurrent UTI  Tonic condition.  Stable.  Continue antibiotic as directed by urology.  Follow with urology.  Advised could refer him to [facility] but they declined.    2. (HCC) 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, without long-term current use of insulin  Chronic condition.  Stable.  Follow-up in April for labs.  Continue medications as directed.  Follow with retinal specialist in January or February.    3. Influenza vaccine needed  Administered without complaints.  Discussed side effects.  - INFLUENZA VACCINE, HIGH DOSE (65+ ONLY)    4. Encounter to establish care  Suggested following up with Dr. Alfonso to establish care.      Followup: Return in about 3 months (around 3/2/2020), or if symptoms worsen or fail to improve.    Please note that this dictation was created using voice recognition software. I have made every reasonable attempt to correct obvious errors, but I expect that there are errors of grammar and possibly content that I did not discover before finalizing the note.

## 2019-12-05 ENCOUNTER — HOSPITAL ENCOUNTER (OUTPATIENT)
Dept: RADIOLOGY | Facility: MEDICAL CENTER | Age: 74
End: 2019-12-05
Attending: PHYSICIAN ASSISTANT
Payer: MEDICARE

## 2019-12-05 DIAGNOSIS — N39.0 URINARY TRACT INFECTION WITHOUT HEMATURIA, SITE UNSPECIFIED: ICD-10-CM

## 2019-12-05 PROCEDURE — 76775 US EXAM ABDO BACK WALL LIM: CPT

## 2019-12-10 ENCOUNTER — HOSPITAL ENCOUNTER (OUTPATIENT)
Dept: LAB | Facility: MEDICAL CENTER | Age: 74
End: 2019-12-10
Attending: PHYSICIAN ASSISTANT
Payer: MEDICARE

## 2019-12-10 LAB
ANION GAP SERPL CALC-SCNC: 11 MMOL/L (ref 0–11.9)
BUN SERPL-MCNC: 24 MG/DL (ref 8–22)
CALCIUM SERPL-MCNC: 8.7 MG/DL (ref 8.5–10.5)
CHLORIDE SERPL-SCNC: 103 MMOL/L (ref 96–112)
CO2 SERPL-SCNC: 27 MMOL/L (ref 20–33)
CREAT SERPL-MCNC: 1.68 MG/DL (ref 0.5–1.4)
GLUCOSE SERPL-MCNC: 122 MG/DL (ref 65–99)
POTASSIUM SERPL-SCNC: 3.9 MMOL/L (ref 3.6–5.5)
SODIUM SERPL-SCNC: 141 MMOL/L (ref 135–145)

## 2019-12-10 PROCEDURE — 80048 BASIC METABOLIC PNL TOTAL CA: CPT

## 2019-12-10 PROCEDURE — 36415 COLL VENOUS BLD VENIPUNCTURE: CPT

## 2019-12-17 ENCOUNTER — HOSPITAL ENCOUNTER (OUTPATIENT)
Dept: LAB | Facility: MEDICAL CENTER | Age: 74
End: 2019-12-17
Attending: PHYSICIAN ASSISTANT
Payer: MEDICARE

## 2019-12-17 PROCEDURE — 87086 URINE CULTURE/COLONY COUNT: CPT

## 2019-12-17 PROCEDURE — 87186 SC STD MICRODIL/AGAR DIL: CPT

## 2019-12-17 PROCEDURE — 87077 CULTURE AEROBIC IDENTIFY: CPT

## 2019-12-18 ENCOUNTER — TELEPHONE (OUTPATIENT)
Dept: ENDOCRINOLOGY | Facility: MEDICAL CENTER | Age: 74
End: 2019-12-18

## 2019-12-18 NOTE — TELEPHONE ENCOUNTER
MEDICATION PRIOR AUTHORIZATION NEEDED:    1. Name of Medication: Farxiga     2. Requested By (Name of Pharmacy): cover my meds     3. Is insurance on file current? yes    4. What is the name & phone number of the 3rd party payor? Cover my meds    5. Status is pending

## 2020-01-07 ENCOUNTER — HOSPITAL ENCOUNTER (OUTPATIENT)
Dept: LAB | Facility: MEDICAL CENTER | Age: 75
End: 2020-01-07
Attending: INTERNAL MEDICINE
Payer: MEDICARE

## 2020-01-07 LAB
ALBUMIN SERPL BCP-MCNC: 3.2 G/DL (ref 3.2–4.9)
ALBUMIN/GLOB SERPL: 1.3 G/DL
ALP SERPL-CCNC: 62 U/L (ref 30–99)
ALT SERPL-CCNC: 20 U/L (ref 2–50)
ANION GAP SERPL CALC-SCNC: 9 MMOL/L (ref 0–11.9)
AST SERPL-CCNC: 19 U/L (ref 12–45)
BASOPHILS # BLD AUTO: 0.4 % (ref 0–1.8)
BASOPHILS # BLD: 0.04 K/UL (ref 0–0.12)
BILIRUB SERPL-MCNC: 0.9 MG/DL (ref 0.1–1.5)
BUN SERPL-MCNC: 25 MG/DL (ref 8–22)
CALCIUM SERPL-MCNC: 9 MG/DL (ref 8.5–10.5)
CHLORIDE SERPL-SCNC: 106 MMOL/L (ref 96–112)
CO2 SERPL-SCNC: 27 MMOL/L (ref 20–33)
CREAT SERPL-MCNC: 1.6 MG/DL (ref 0.5–1.4)
EOSINOPHIL # BLD AUTO: 0.24 K/UL (ref 0–0.51)
EOSINOPHIL NFR BLD: 2.2 % (ref 0–6.9)
ERYTHROCYTE [DISTWIDTH] IN BLOOD BY AUTOMATED COUNT: 51.7 FL (ref 35.9–50)
FASTING STATUS PATIENT QL REPORTED: NORMAL
GLOBULIN SER CALC-MCNC: 2.4 G/DL (ref 1.9–3.5)
GLUCOSE SERPL-MCNC: 212 MG/DL (ref 65–99)
HCT VFR BLD AUTO: 40.5 % (ref 42–52)
HGB BLD-MCNC: 12.9 G/DL (ref 14–18)
IMM GRANULOCYTES # BLD AUTO: 0.07 K/UL (ref 0–0.11)
IMM GRANULOCYTES NFR BLD AUTO: 0.7 % (ref 0–0.9)
LYMPHOCYTES # BLD AUTO: 0.76 K/UL (ref 1–4.8)
LYMPHOCYTES NFR BLD: 7.1 % (ref 22–41)
MCH RBC QN AUTO: 28.8 PG (ref 27–33)
MCHC RBC AUTO-ENTMCNC: 31.9 G/DL (ref 33.7–35.3)
MCV RBC AUTO: 90.4 FL (ref 81.4–97.8)
MONOCYTES # BLD AUTO: 0.51 K/UL (ref 0–0.85)
MONOCYTES NFR BLD AUTO: 4.8 % (ref 0–13.4)
NEUTROPHILS # BLD AUTO: 9.05 K/UL (ref 1.82–7.42)
NEUTROPHILS NFR BLD: 84.8 % (ref 44–72)
NRBC # BLD AUTO: 0 K/UL
NRBC BLD-RTO: 0 /100 WBC
PLATELET # BLD AUTO: 263 K/UL (ref 164–446)
PMV BLD AUTO: 10.7 FL (ref 9–12.9)
POTASSIUM SERPL-SCNC: 4.4 MMOL/L (ref 3.6–5.5)
PROT SERPL-MCNC: 5.6 G/DL (ref 6–8.2)
RBC # BLD AUTO: 4.48 M/UL (ref 4.7–6.1)
SODIUM SERPL-SCNC: 142 MMOL/L (ref 135–145)
WBC # BLD AUTO: 10.7 K/UL (ref 4.8–10.8)

## 2020-01-07 PROCEDURE — 85025 COMPLETE CBC W/AUTO DIFF WBC: CPT

## 2020-01-07 PROCEDURE — 84443 ASSAY THYROID STIM HORMONE: CPT | Mod: GA

## 2020-01-07 PROCEDURE — 80053 COMPREHEN METABOLIC PANEL: CPT

## 2020-01-07 PROCEDURE — 36415 COLL VENOUS BLD VENIPUNCTURE: CPT

## 2020-01-08 LAB — TSH SERPL DL<=0.005 MIU/L-ACNC: 1.62 UIU/ML (ref 0.38–5.33)

## 2020-01-20 ENCOUNTER — HOSPITAL ENCOUNTER (OUTPATIENT)
Dept: LAB | Facility: MEDICAL CENTER | Age: 75
End: 2020-01-20
Attending: PHYSICIAN ASSISTANT
Payer: MEDICARE

## 2020-01-20 PROCEDURE — 87086 URINE CULTURE/COLONY COUNT: CPT

## 2020-01-20 PROCEDURE — 87077 CULTURE AEROBIC IDENTIFY: CPT

## 2020-01-20 PROCEDURE — 87186 SC STD MICRODIL/AGAR DIL: CPT

## 2020-01-20 NOTE — TELEPHONE ENCOUNTER
Was the patient seen in the last year in this department? Yes    Does patient have an active prescription for medications requested? No     Received Request Via: Pharmacy     ONE TOUCH ULTRA TEST strip

## 2020-01-20 NOTE — TELEPHONE ENCOUNTER
Was the patient seen in the last year in this department? Yes LOV: 10/15/19    Does patient have an active prescription for medications requested? No     Received Request Via: Pharmacy     ONE TOUCH ULTRA TEST strip       Sig: USE TO TEST FIVE TIMES A DAY

## 2020-01-21 ENCOUNTER — HOSPITAL ENCOUNTER (INPATIENT)
Facility: MEDICAL CENTER | Age: 75
LOS: 3 days | DRG: 872 | End: 2020-01-24
Attending: EMERGENCY MEDICINE | Admitting: INTERNAL MEDICINE
Payer: MEDICARE

## 2020-01-21 ENCOUNTER — APPOINTMENT (OUTPATIENT)
Dept: RADIOLOGY | Facility: MEDICAL CENTER | Age: 75
DRG: 872 | End: 2020-01-21
Attending: EMERGENCY MEDICINE
Payer: MEDICARE

## 2020-01-21 DIAGNOSIS — N12 PYELONEPHRITIS: ICD-10-CM

## 2020-01-21 DIAGNOSIS — R31.0 GROSS HEMATURIA: ICD-10-CM

## 2020-01-21 PROBLEM — R31.9 HEMATURIA: Status: ACTIVE | Noted: 2020-01-21

## 2020-01-21 PROBLEM — N18.9 ACUTE ON CHRONIC RENAL FAILURE (HCC): Status: ACTIVE | Noted: 2020-01-21

## 2020-01-21 PROBLEM — A41.9 SEPSIS (HCC): Status: ACTIVE | Noted: 2020-01-21

## 2020-01-21 PROBLEM — N13.30 HYDRONEPHROSIS: Status: ACTIVE | Noted: 2020-01-21

## 2020-01-21 PROBLEM — N28.89 RENAL MASS: Status: ACTIVE | Noted: 2020-01-21

## 2020-01-21 PROBLEM — N17.9 ACUTE ON CHRONIC RENAL FAILURE (HCC): Status: ACTIVE | Noted: 2020-01-21

## 2020-01-21 PROBLEM — D64.9 NORMOCYTIC ANEMIA: Status: ACTIVE | Noted: 2020-01-21

## 2020-01-21 LAB
ALBUMIN SERPL BCP-MCNC: 3.5 G/DL (ref 3.2–4.9)
ALBUMIN/GLOB SERPL: 1.8 G/DL
ALP SERPL-CCNC: 64 U/L (ref 30–99)
ALT SERPL-CCNC: 14 U/L (ref 2–50)
ANION GAP SERPL CALC-SCNC: 13 MMOL/L (ref 0–11.9)
AST SERPL-CCNC: 15 U/L (ref 12–45)
BASOPHILS # BLD AUTO: 0.2 % (ref 0–1.8)
BASOPHILS # BLD: 0.03 K/UL (ref 0–0.12)
BILIRUB SERPL-MCNC: 0.6 MG/DL (ref 0.1–1.5)
BUN SERPL-MCNC: 32 MG/DL (ref 8–22)
CALCIUM SERPL-MCNC: 9 MG/DL (ref 8.4–10.2)
CHLORIDE SERPL-SCNC: 105 MMOL/L (ref 96–112)
CO2 SERPL-SCNC: 24 MMOL/L (ref 20–33)
CREAT SERPL-MCNC: 1.83 MG/DL (ref 0.5–1.4)
EOSINOPHIL # BLD AUTO: 0.03 K/UL (ref 0–0.51)
EOSINOPHIL NFR BLD: 0.2 % (ref 0–6.9)
ERYTHROCYTE [DISTWIDTH] IN BLOOD BY AUTOMATED COUNT: 48.4 FL (ref 35.9–50)
GLOBULIN SER CALC-MCNC: 2 G/DL (ref 1.9–3.5)
GLUCOSE BLD-MCNC: 66 MG/DL (ref 65–99)
GLUCOSE SERPL-MCNC: 105 MG/DL (ref 65–99)
HCT VFR BLD AUTO: 35 % (ref 42–52)
HGB BLD-MCNC: 11.6 G/DL (ref 14–18)
IMM GRANULOCYTES # BLD AUTO: 0.16 K/UL (ref 0–0.11)
IMM GRANULOCYTES NFR BLD AUTO: 1 % (ref 0–0.9)
LACTATE BLD-SCNC: 1.6 MMOL/L (ref 0.5–2)
LIPASE SERPL-CCNC: 29 U/L (ref 7–58)
LYMPHOCYTES # BLD AUTO: 0.57 K/UL (ref 1–4.8)
LYMPHOCYTES NFR BLD: 3.6 % (ref 22–41)
MCH RBC QN AUTO: 28.7 PG (ref 27–33)
MCHC RBC AUTO-ENTMCNC: 33.1 G/DL (ref 33.7–35.3)
MCV RBC AUTO: 86.6 FL (ref 81.4–97.8)
MONOCYTES # BLD AUTO: 0.92 K/UL (ref 0–0.85)
MONOCYTES NFR BLD AUTO: 5.8 % (ref 0–13.4)
NEUTROPHILS # BLD AUTO: 14.16 K/UL (ref 1.82–7.42)
NEUTROPHILS NFR BLD: 89.2 % (ref 44–72)
NRBC # BLD AUTO: 0 K/UL
NRBC BLD-RTO: 0 /100 WBC
PLATELET # BLD AUTO: 301 K/UL (ref 164–446)
PMV BLD AUTO: 9.5 FL (ref 9–12.9)
POTASSIUM SERPL-SCNC: 4 MMOL/L (ref 3.6–5.5)
PROT SERPL-MCNC: 5.5 G/DL (ref 6–8.2)
RBC # BLD AUTO: 4.04 M/UL (ref 4.7–6.1)
SODIUM SERPL-SCNC: 142 MMOL/L (ref 135–145)
WBC # BLD AUTO: 15.9 K/UL (ref 4.8–10.8)

## 2020-01-21 PROCEDURE — 87040 BLOOD CULTURE FOR BACTERIA: CPT

## 2020-01-21 PROCEDURE — 83605 ASSAY OF LACTIC ACID: CPT

## 2020-01-21 PROCEDURE — 80053 COMPREHEN METABOLIC PANEL: CPT

## 2020-01-21 PROCEDURE — 96365 THER/PROPH/DIAG IV INF INIT: CPT

## 2020-01-21 PROCEDURE — 96375 TX/PRO/DX INJ NEW DRUG ADDON: CPT

## 2020-01-21 PROCEDURE — 700111 HCHG RX REV CODE 636 W/ 250 OVERRIDE (IP): Performed by: EMERGENCY MEDICINE

## 2020-01-21 PROCEDURE — 85025 COMPLETE CBC W/AUTO DIFF WBC: CPT

## 2020-01-21 PROCEDURE — 99285 EMERGENCY DEPT VISIT HI MDM: CPT

## 2020-01-21 PROCEDURE — 770006 HCHG ROOM/CARE - MED/SURG/GYN SEMI*

## 2020-01-21 PROCEDURE — 36415 COLL VENOUS BLD VENIPUNCTURE: CPT

## 2020-01-21 PROCEDURE — 83690 ASSAY OF LIPASE: CPT

## 2020-01-21 PROCEDURE — 82962 GLUCOSE BLOOD TEST: CPT

## 2020-01-21 PROCEDURE — 99223 1ST HOSP IP/OBS HIGH 75: CPT | Mod: AI | Performed by: INTERNAL MEDICINE

## 2020-01-21 PROCEDURE — 700105 HCHG RX REV CODE 258: Performed by: EMERGENCY MEDICINE

## 2020-01-21 PROCEDURE — 74176 CT ABD & PELVIS W/O CONTRAST: CPT

## 2020-01-21 RX ORDER — ONDANSETRON 2 MG/ML
4 INJECTION INTRAMUSCULAR; INTRAVENOUS ONCE
Status: COMPLETED | OUTPATIENT
Start: 2020-01-21 | End: 2020-01-21

## 2020-01-21 RX ORDER — AMOXICILLIN 250 MG
2 CAPSULE ORAL 2 TIMES DAILY
Status: DISCONTINUED | OUTPATIENT
Start: 2020-01-21 | End: 2020-01-24 | Stop reason: HOSPADM

## 2020-01-21 RX ORDER — POLYETHYLENE GLYCOL 3350 17 G/17G
1 POWDER, FOR SOLUTION ORAL
Status: DISCONTINUED | OUTPATIENT
Start: 2020-01-21 | End: 2020-01-24 | Stop reason: HOSPADM

## 2020-01-21 RX ORDER — SODIUM CHLORIDE 9 MG/ML
1000 INJECTION, SOLUTION INTRAVENOUS ONCE
Status: COMPLETED | OUTPATIENT
Start: 2020-01-21 | End: 2020-01-22

## 2020-01-21 RX ORDER — ONDANSETRON 2 MG/ML
4 INJECTION INTRAMUSCULAR; INTRAVENOUS EVERY 4 HOURS PRN
Status: DISCONTINUED | OUTPATIENT
Start: 2020-01-21 | End: 2020-01-24 | Stop reason: HOSPADM

## 2020-01-21 RX ORDER — SODIUM CHLORIDE, SODIUM LACTATE, POTASSIUM CHLORIDE, AND CALCIUM CHLORIDE .6; .31; .03; .02 G/100ML; G/100ML; G/100ML; G/100ML
1000 INJECTION, SOLUTION INTRAVENOUS
Status: DISCONTINUED | OUTPATIENT
Start: 2020-01-21 | End: 2020-01-24 | Stop reason: HOSPADM

## 2020-01-21 RX ORDER — SODIUM CHLORIDE, SODIUM LACTATE, POTASSIUM CHLORIDE, CALCIUM CHLORIDE 600; 310; 30; 20 MG/100ML; MG/100ML; MG/100ML; MG/100ML
INJECTION, SOLUTION INTRAVENOUS CONTINUOUS
Status: DISCONTINUED | OUTPATIENT
Start: 2020-01-21 | End: 2020-01-23

## 2020-01-21 RX ORDER — METOPROLOL SUCCINATE 100 MG/1
200 TABLET, EXTENDED RELEASE ORAL EVERY EVENING
Status: DISCONTINUED | OUTPATIENT
Start: 2020-01-21 | End: 2020-01-24 | Stop reason: HOSPADM

## 2020-01-21 RX ORDER — LABETALOL HYDROCHLORIDE 5 MG/ML
10 INJECTION, SOLUTION INTRAVENOUS EVERY 4 HOURS PRN
Status: DISCONTINUED | OUTPATIENT
Start: 2020-01-21 | End: 2020-01-24 | Stop reason: HOSPADM

## 2020-01-21 RX ORDER — BISACODYL 10 MG
10 SUPPOSITORY, RECTAL RECTAL
Status: DISCONTINUED | OUTPATIENT
Start: 2020-01-21 | End: 2020-01-24 | Stop reason: HOSPADM

## 2020-01-21 RX ORDER — ALFUZOSIN HYDROCHLORIDE 10 MG/1
10 TABLET, EXTENDED RELEASE ORAL DAILY
Status: ON HOLD | COMMUNITY
End: 2022-05-31

## 2020-01-21 RX ORDER — METHENAMINE HIPPURATE 1000 MG/1
1 TABLET ORAL 2 TIMES DAILY
Status: ON HOLD | COMMUNITY
End: 2022-05-31

## 2020-01-21 RX ORDER — SODIUM CHLORIDE, SODIUM LACTATE, POTASSIUM CHLORIDE, CALCIUM CHLORIDE 600; 310; 30; 20 MG/100ML; MG/100ML; MG/100ML; MG/100ML
1000 INJECTION, SOLUTION INTRAVENOUS ONCE
Status: DISCONTINUED | OUTPATIENT
Start: 2020-01-21 | End: 2020-01-21

## 2020-01-21 RX ORDER — FENOFIBRATE 134 MG/1
134 CAPSULE ORAL DAILY
Status: DISCONTINUED | OUTPATIENT
Start: 2020-01-22 | End: 2020-01-24 | Stop reason: HOSPADM

## 2020-01-21 RX ORDER — ACETAMINOPHEN 325 MG/1
650 TABLET ORAL EVERY 6 HOURS PRN
Status: DISCONTINUED | OUTPATIENT
Start: 2020-01-21 | End: 2020-01-24 | Stop reason: HOSPADM

## 2020-01-21 RX ORDER — ONDANSETRON 4 MG/1
4 TABLET, ORALLY DISINTEGRATING ORAL EVERY 4 HOURS PRN
Status: DISCONTINUED | OUTPATIENT
Start: 2020-01-21 | End: 2020-01-24 | Stop reason: HOSPADM

## 2020-01-21 RX ORDER — FLUOXETINE HYDROCHLORIDE 20 MG/1
40 CAPSULE ORAL
Status: DISCONTINUED | OUTPATIENT
Start: 2020-01-22 | End: 2020-01-24 | Stop reason: HOSPADM

## 2020-01-21 RX ORDER — ATORVASTATIN CALCIUM 40 MG/1
80 TABLET, FILM COATED ORAL
Status: DISCONTINUED | OUTPATIENT
Start: 2020-01-21 | End: 2020-01-24 | Stop reason: HOSPADM

## 2020-01-21 RX ORDER — FENOFIBRATE 145 MG/1
145 TABLET, COATED ORAL DAILY
Status: DISCONTINUED | OUTPATIENT
Start: 2020-01-22 | End: 2020-01-21

## 2020-01-21 RX ADMIN — ONDANSETRON 4 MG: 2 INJECTION INTRAMUSCULAR; INTRAVENOUS at 20:56

## 2020-01-21 RX ADMIN — SODIUM CHLORIDE 1000 ML: 9 INJECTION, SOLUTION INTRAVENOUS at 21:00

## 2020-01-21 RX ADMIN — CEFTRIAXONE SODIUM 2 G: 2 INJECTION, POWDER, FOR SOLUTION INTRAMUSCULAR; INTRAVENOUS at 20:55

## 2020-01-21 ASSESSMENT — ENCOUNTER SYMPTOMS
NAUSEA: 1
CHILLS: 1
CONSTIPATION: 0
MYALGIAS: 0
STRIDOR: 0
COUGH: 0
DIZZINESS: 0
SPUTUM PRODUCTION: 0
FEVER: 0
ABDOMINAL PAIN: 0
FALLS: 0
WEAKNESS: 0
VOMITING: 1
TINGLING: 0
HEADACHES: 0
SHORTNESS OF BREATH: 0
DEPRESSION: 0
LOSS OF CONSCIOUSNESS: 0
DIARRHEA: 0
PALPITATIONS: 0

## 2020-01-22 ENCOUNTER — APPOINTMENT (OUTPATIENT)
Dept: RADIOLOGY | Facility: MEDICAL CENTER | Age: 75
DRG: 872 | End: 2020-01-22
Attending: INTERNAL MEDICINE
Payer: MEDICARE

## 2020-01-22 PROBLEM — R26.9 ALTERED MOBILITY DUE TO OLD STROKE: Status: ACTIVE | Noted: 2020-01-22

## 2020-01-22 PROBLEM — I69.398 ALTERED MOBILITY DUE TO OLD STROKE: Status: ACTIVE | Noted: 2020-01-22

## 2020-01-22 PROBLEM — R60.0 LEG EDEMA, RIGHT: Status: ACTIVE | Noted: 2020-01-22

## 2020-01-22 LAB
ANION GAP SERPL CALC-SCNC: 10 MMOL/L (ref 0–11.9)
BUN SERPL-MCNC: 31 MG/DL (ref 8–22)
CALCIUM SERPL-MCNC: 8.4 MG/DL (ref 8.4–10.2)
CHLORIDE SERPL-SCNC: 106 MMOL/L (ref 96–112)
CO2 SERPL-SCNC: 24 MMOL/L (ref 20–33)
CREAT SERPL-MCNC: 1.88 MG/DL (ref 0.5–1.4)
ERYTHROCYTE [DISTWIDTH] IN BLOOD BY AUTOMATED COUNT: 50.4 FL (ref 35.9–50)
GLUCOSE BLD-MCNC: 106 MG/DL (ref 65–99)
GLUCOSE BLD-MCNC: 120 MG/DL (ref 65–99)
GLUCOSE BLD-MCNC: 180 MG/DL (ref 65–99)
GLUCOSE BLD-MCNC: 193 MG/DL (ref 65–99)
GLUCOSE BLD-MCNC: 87 MG/DL (ref 65–99)
GLUCOSE SERPL-MCNC: 127 MG/DL (ref 65–99)
HCT VFR BLD AUTO: 32.5 % (ref 42–52)
HGB BLD-MCNC: 10.5 G/DL (ref 14–18)
INR PPP: 1.19 (ref 0.87–1.13)
LACTATE BLD-SCNC: 2.3 MMOL/L (ref 0.5–2)
LACTATE BLD-SCNC: 2.3 MMOL/L (ref 0.5–2)
MCH RBC QN AUTO: 28.8 PG (ref 27–33)
MCHC RBC AUTO-ENTMCNC: 32.3 G/DL (ref 33.7–35.3)
MCV RBC AUTO: 89 FL (ref 81.4–97.8)
PLATELET # BLD AUTO: 236 K/UL (ref 164–446)
PMV BLD AUTO: 10.2 FL (ref 9–12.9)
POTASSIUM SERPL-SCNC: 4.5 MMOL/L (ref 3.6–5.5)
PROTHROMBIN TIME: 15.3 SEC (ref 12–14.6)
RBC # BLD AUTO: 3.65 M/UL (ref 4.7–6.1)
SODIUM SERPL-SCNC: 140 MMOL/L (ref 135–145)
WBC # BLD AUTO: 17.4 K/UL (ref 4.8–10.8)

## 2020-01-22 PROCEDURE — 85610 PROTHROMBIN TIME: CPT

## 2020-01-22 PROCEDURE — 770006 HCHG ROOM/CARE - MED/SURG/GYN SEMI*

## 2020-01-22 PROCEDURE — 83605 ASSAY OF LACTIC ACID: CPT | Mod: 91

## 2020-01-22 PROCEDURE — 80048 BASIC METABOLIC PNL TOTAL CA: CPT

## 2020-01-22 PROCEDURE — 85027 COMPLETE CBC AUTOMATED: CPT

## 2020-01-22 PROCEDURE — 700102 HCHG RX REV CODE 250 W/ 637 OVERRIDE(OP): Performed by: INTERNAL MEDICINE

## 2020-01-22 PROCEDURE — 76775 US EXAM ABDO BACK WALL LIM: CPT

## 2020-01-22 PROCEDURE — A9270 NON-COVERED ITEM OR SERVICE: HCPCS

## 2020-01-22 PROCEDURE — 82962 GLUCOSE BLOOD TEST: CPT

## 2020-01-22 PROCEDURE — 700111 HCHG RX REV CODE 636 W/ 250 OVERRIDE (IP): Performed by: INTERNAL MEDICINE

## 2020-01-22 PROCEDURE — 700102 HCHG RX REV CODE 250 W/ 637 OVERRIDE(OP)

## 2020-01-22 PROCEDURE — 99233 SBSQ HOSP IP/OBS HIGH 50: CPT | Performed by: INTERNAL MEDICINE

## 2020-01-22 PROCEDURE — 700105 HCHG RX REV CODE 258: Performed by: INTERNAL MEDICINE

## 2020-01-22 PROCEDURE — 700111 HCHG RX REV CODE 636 W/ 250 OVERRIDE (IP): Performed by: EMERGENCY MEDICINE

## 2020-01-22 PROCEDURE — A9270 NON-COVERED ITEM OR SERVICE: HCPCS | Performed by: INTERNAL MEDICINE

## 2020-01-22 PROCEDURE — 36415 COLL VENOUS BLD VENIPUNCTURE: CPT

## 2020-01-22 PROCEDURE — 93971 EXTREMITY STUDY: CPT | Mod: RT

## 2020-01-22 RX ORDER — ALLOPURINOL 100 MG/1
100 TABLET ORAL EVERY EVENING
COMMUNITY
End: 2020-02-18

## 2020-01-22 RX ORDER — FENOFIBRATE 145 MG/1
145 TABLET, COATED ORAL EVERY EVENING
COMMUNITY
End: 2020-11-02

## 2020-01-22 RX ORDER — CLOPIDOGREL BISULFATE 75 MG/1
75 TABLET ORAL EVERY EVENING
Status: ON HOLD | COMMUNITY
End: 2020-01-24

## 2020-01-22 RX ORDER — MORPHINE SULFATE 4 MG/ML
4 INJECTION, SOLUTION INTRAMUSCULAR; INTRAVENOUS ONCE
Status: COMPLETED | OUTPATIENT
Start: 2020-01-22 | End: 2020-01-22

## 2020-01-22 RX ORDER — FLUOXETINE HYDROCHLORIDE 40 MG/1
40 CAPSULE ORAL DAILY
COMMUNITY
End: 2020-03-04

## 2020-01-22 RX ORDER — MORPHINE SULFATE 4 MG/ML
4 INJECTION, SOLUTION INTRAMUSCULAR; INTRAVENOUS EVERY 4 HOURS PRN
Status: DISCONTINUED | OUTPATIENT
Start: 2020-01-22 | End: 2020-01-24 | Stop reason: HOSPADM

## 2020-01-22 RX ORDER — ATORVASTATIN CALCIUM 80 MG/1
80 TABLET, FILM COATED ORAL NIGHTLY
COMMUNITY
End: 2020-09-16

## 2020-01-22 RX ADMIN — SODIUM CHLORIDE, POTASSIUM CHLORIDE, SODIUM LACTATE AND CALCIUM CHLORIDE: 600; 310; 30; 20 INJECTION, SOLUTION INTRAVENOUS at 06:03

## 2020-01-22 RX ADMIN — INSULIN HUMAN 2 UNITS: 100 INJECTION, SOLUTION PARENTERAL at 17:45

## 2020-01-22 RX ADMIN — Medication 800 MG: at 00:50

## 2020-01-22 RX ADMIN — SODIUM CHLORIDE, POTASSIUM CHLORIDE, SODIUM LACTATE AND CALCIUM CHLORIDE: 600; 310; 30; 20 INJECTION, SOLUTION INTRAVENOUS at 17:50

## 2020-01-22 RX ADMIN — MORPHINE SULFATE 4 MG: 4 INJECTION INTRAVENOUS at 01:49

## 2020-01-22 RX ADMIN — Medication 800 MG: at 13:14

## 2020-01-22 RX ADMIN — INSULIN HUMAN 2 UNITS: 100 INJECTION, SOLUTION PARENTERAL at 21:20

## 2020-01-22 RX ADMIN — ATORVASTATIN CALCIUM 80 MG: 40 TABLET, FILM COATED ORAL at 00:13

## 2020-01-22 RX ADMIN — SODIUM CHLORIDE, POTASSIUM CHLORIDE, SODIUM LACTATE AND CALCIUM CHLORIDE: 600; 310; 30; 20 INJECTION, SOLUTION INTRAVENOUS at 00:30

## 2020-01-22 RX ADMIN — FENOFIBRATE 134 MG: 134 CAPSULE ORAL at 06:11

## 2020-01-22 RX ADMIN — METOPROLOL SUCCINATE 200 MG: 100 TABLET, EXTENDED RELEASE ORAL at 00:14

## 2020-01-22 RX ADMIN — ATORVASTATIN CALCIUM 80 MG: 40 TABLET, FILM COATED ORAL at 22:49

## 2020-01-22 RX ADMIN — METOPROLOL SUCCINATE 200 MG: 100 TABLET, EXTENDED RELEASE ORAL at 17:38

## 2020-01-22 RX ADMIN — PIPERACILLIN AND TAZOBACTAM 3.38 G: 3; .375 INJECTION, POWDER, LYOPHILIZED, FOR SOLUTION INTRAVENOUS; PARENTERAL at 22:42

## 2020-01-22 RX ADMIN — Medication 800 MG: at 22:49

## 2020-01-22 RX ADMIN — PIPERACILLIN AND TAZOBACTAM 3.38 G: 3; .375 INJECTION, POWDER, LYOPHILIZED, FOR SOLUTION INTRAVENOUS; PARENTERAL at 17:39

## 2020-01-22 RX ADMIN — FLUOXETINE 40 MG: 20 CAPSULE ORAL at 06:12

## 2020-01-22 ASSESSMENT — COPD QUESTIONNAIRES
COPD SCREENING SCORE: 2
IN THE PAST 12 MONTHS DO YOU DO LESS THAN YOU USED TO BECAUSE OF YOUR BREATHING PROBLEMS: DISAGREE/UNSURE
DO YOU EVER COUGH UP ANY MUCUS OR PHLEGM?: NO/ONLY WITH OCCASIONAL COLDS OR INFECTIONS
DURING THE PAST 4 WEEKS HOW MUCH DID YOU FEEL SHORT OF BREATH: NONE/LITTLE OF THE TIME
HAVE YOU SMOKED AT LEAST 100 CIGARETTES IN YOUR ENTIRE LIFE: NO/DON'T KNOW

## 2020-01-22 ASSESSMENT — ENCOUNTER SYMPTOMS
HEARTBURN: 0
BLURRED VISION: 0
COUGH: 0
FEVER: 0
MYALGIAS: 0

## 2020-01-22 ASSESSMENT — COGNITIVE AND FUNCTIONAL STATUS - GENERAL
EATING MEALS: A LITTLE
DRESSING REGULAR LOWER BODY CLOTHING: A LITTLE
PERSONAL GROOMING: A LITTLE
MOVING FROM LYING ON BACK TO SITTING ON SIDE OF FLAT BED: A LITTLE
CLIMB 3 TO 5 STEPS WITH RAILING: A LITTLE
DRESSING REGULAR UPPER BODY CLOTHING: A LITTLE
TURNING FROM BACK TO SIDE WHILE IN FLAT BAD: A LITTLE
MOVING TO AND FROM BED TO CHAIR: A LITTLE
SUGGESTED CMS G CODE MODIFIER MOBILITY: CK
TOILETING: A LITTLE
STANDING UP FROM CHAIR USING ARMS: A LITTLE
SUGGESTED CMS G CODE MODIFIER DAILY ACTIVITY: CK
WALKING IN HOSPITAL ROOM: A LOT
MOBILITY SCORE: 17
DAILY ACTIVITIY SCORE: 18
HELP NEEDED FOR BATHING: A LITTLE

## 2020-01-22 ASSESSMENT — LIFESTYLE VARIABLES
TOTAL SCORE: 0
ON A TYPICAL DAY WHEN YOU DRINK ALCOHOL HOW MANY DRINKS DO YOU HAVE: 0
ALCOHOL_USE: NO
EVER_SMOKED: NEVER
EVER FELT BAD OR GUILTY ABOUT YOUR DRINKING: NO
HOW MANY TIMES IN THE PAST YEAR HAVE YOU HAD 5 OR MORE DRINKS IN A DAY: 0
AVERAGE NUMBER OF DAYS PER WEEK YOU HAVE A DRINK CONTAINING ALCOHOL: 0
CONSUMPTION TOTAL: NEGATIVE
HAVE PEOPLE ANNOYED YOU BY CRITICIZING YOUR DRINKING: NO
DOES PATIENT WANT TO STOP DRINKING: NO
EVER HAD A DRINK FIRST THING IN THE MORNING TO STEADY YOUR NERVES TO GET RID OF A HANGOVER: NO
HAVE YOU EVER FELT YOU SHOULD CUT DOWN ON YOUR DRINKING: NO
TOTAL SCORE: 0
TOTAL SCORE: 0

## 2020-01-22 NOTE — ED NOTES
Rounded with patient. Assisted Ning RN to reposition and turn patient on gurney. Patient states he has pain in low back area; pillow provided when repositioned. Offered waffle cushion; patient declined stating he does not like that. Called [facility] and requested a hospital bed if available.

## 2020-01-22 NOTE — ASSESSMENT & PLAN NOTE
-Profound hematuria on admission, resolved on CBI   Discussed with urology 1/22; dc CBI and yu and monitor another day to ensure no more bleeding; home tomorrow if stable;   has planned elective cystoscopy next month  -Hold home Plavix and aspirin

## 2020-01-22 NOTE — PROGRESS NOTES
Hospital Medicine Daily Progress Note    Date of Service  1/22/2020    Chief Complaint  74 y.o. male admitted 1/21/2020 with hematuria for 2 days, new onset    Hospital Course    started on CBI and urine culture sent, growing gram negative bacteria.  Also has CATA on CKD treated with IVF.  On empiric IV abx.      Interval Problem Update  Feels a little better today; no pain/f/c; urine less bloody on CBI but not yet clear.      Consultants/Specialty  urology    Code Status  full    Disposition  Home when stable    Review of Systems  Review of Systems   Constitutional: Negative for fever.   HENT: Negative for hearing loss.    Eyes: Negative for blurred vision.   Respiratory: Negative for cough.    Cardiovascular: Negative for chest pain.   Gastrointestinal: Negative for heartburn.   Genitourinary: Positive for hematuria.   Musculoskeletal: Negative for myalgias.   Skin: Negative for rash.        Physical Exam  Temp:  [36.1 °C (97 °F)] 36.1 °C (97 °F)  Pulse:  [64-92] 64  Resp:  [16-25] 17  BP: ()/() 111/57  SpO2:  [94 %-99 %] 96 %    Physical Exam  HENT:      Head: Normocephalic.      Nose: Nose normal.      Mouth/Throat:      Mouth: Mucous membranes are moist.   Eyes:      Extraocular Movements: Extraocular movements intact.   Neck:      Musculoskeletal: Normal range of motion and neck supple.   Cardiovascular:      Rate and Rhythm: Normal rate and regular rhythm.      Pulses: Normal pulses.      Heart sounds: Normal heart sounds.   Pulmonary:      Effort: Pulmonary effort is normal.      Breath sounds: Normal breath sounds.   Abdominal:      General: Bowel sounds are normal.      Palpations: Abdomen is soft.      Tenderness: There is no tenderness.   Musculoskeletal:      Right lower leg: Edema present.      Left lower leg: Edema present.      Comments: 2+ edema RLE, 1+ LLE   Skin:     General: Skin is warm.   Neurological:      Mental Status: He is alert.      Comments: Old L HP 3/5 LUE, 3-4/5 LLE    Psychiatric:         Mood and Affect: Mood normal.         Fluids    Intake/Output Summary (Last 24 hours) at 1/22/2020 1537  Last data filed at 1/22/2020 0720  Gross per 24 hour   Intake --   Output 2500 ml   Net -2500 ml       Laboratory  Recent Labs     01/21/20 1925 01/22/20  1158   WBC 15.9* 17.4*   RBC 4.04* 3.65*   HEMOGLOBIN 11.6* 10.5*   HEMATOCRIT 35.0* 32.5*   MCV 86.6 89.0   MCH 28.7 28.8   MCHC 33.1* 32.3*   RDW 48.4 50.4*   PLATELETCT 301 236   MPV 9.5 10.2     Recent Labs     01/21/20 1925 01/22/20  1235   SODIUM 142 140   POTASSIUM 4.0 4.5   CHLORIDE 105 106   CO2 24 24   GLUCOSE 105* 127*   BUN 32* 31*   CREATININE 1.83* 1.88*   CALCIUM 9.0 8.4     Recent Labs     01/22/20  1235   INR 1.19*               Imaging  US-RENAL   Final Result      1.  Moderate bilateral hydronephrosis.   2.  1.7 cm exophytic simple appearing left renal cyst. No follow up imaging is recommended per consensus guidelines of the 2019 ACR Incidental Findings Committee for probably benign incidental simple appearing renal cystic lesion(s) based on imaging    criteria.      CT-RENAL COLIC EVALUATION(A/P W/O)   Final Result         1.  Ill-defined hyperdensity dependently within the bladder most likely representing hemorrhage given the history of hematuria. Follow-up is recommended.   2.  At least moderate bilateral hydroureteronephrosis.   3.  No urolithiasis.   4.  Indeterminate 17 mm exophytic lesion in the left kidney. Further evaluation with renal ultrasound is recommended.   5.  Severe atherosclerosis.   6.  Trace pleural effusions.               US-EXTREMITY VENOUS LOWER UNILAT RIGHT    (Results Pending)        Assessment/Plan  * Hematuria- (present on admission)  Assessment & Plan  -Profound hematuria, improving on CBI although not completely clear  Consult urology, Dr. Briones (patient's urologist); has planned elective cystoscopy next month  -Hold home Plavix and aspirin    Sepsis (HCC)- (present on  admission)  Assessment & Plan  -This is Sepsis Present on admission  SIRS criteria identified on my evaluation include: Tachypnea, with respirations greater than 20 per minute and Leukocyosis, with WBC greater than 12,000  Source is presumed UTI  Sepsis protocol initiated  Fluid resuscitation ordered per protocol  IV antibiotics as appropriate for source of sepsis  While organ dysfunction may be noted elsewhere in this problem list or in the chart, degree of organ dysfunction does not meet CMS criteria for severe sepsis  -Urine culture growing gram negative  -No other complaints to suggest infection from a different organ system  -change to zosyn given rising WBC and recurrent UTI with possible resistant organism.  -Start IV fluids  -Trend lactic acid      Leg edema, right- (present on admission)  Assessment & Plan  More than LLE; unclear if acute vs chronic;  Check US r/o DVT    Hydronephrosis- (present on admission)  Assessment & Plan  -Noted on CT scan, will be further evaluated with renal ultrasound  Mod bilat hydro per US, apparently chronic per wife's report.  Urology consult.    Acute on chronic renal failure (HCC)- (present on admission)  Assessment & Plan  -Due to hydronephrosis and dehydration  -Start IV fluids  -Repeat BMP in the morning  Avoid nephrotoxic meds    (HCC) Paroxysmal atrial fibrillation- (present on admission)  Assessment & Plan  -Currently sinus on home metoprolol, continue  -Patient is on Plavix and aspirin at home, hold due to hematuria    Altered mobility due to old stroke- (present on admission)  Assessment & Plan  Old stroke with L HP, impaired mobility; can walk short distance with cane/walker and assist but mostly WC bound otherwise.  Good support from wife at home.  Resume ASA/Plavix later when hematuria resolves.    Normocytic anemia- (present on admission)  Assessment & Plan  -With significant bleeding from hematuria  -Repeat CBC in the morning    Renal mass- (present on  admission)  Assessment & Plan  -Noted on CT scan, renal ultrasound revealed renal cyst; no further work up indicated.        (HCC) Moderate episode of recurrent major depressive disorder- (present on admission)  Assessment & Plan  -Continue Prozac    Type 2 diabetes mellitus, with long-term current use of insulin ([facility])- (present on admission)  Assessment & Plan  -Controlled glucose at this time, A1C 6.2;  start insulin sliding scale  -Hold home Lantus         VTE prophylaxis: scd

## 2020-01-22 NOTE — ED PROVIDER NOTES
ED Provider Note    CHIEF COMPLAINT  Chief Complaint   Patient presents with   • Blood in Urine     dark red with clots x 2 days   • Weakness   • N/V         HPI  Av Bob is a 74 y.o. male who presents to the ED with hematuria.  The patient*developing hematuria yesterday, went to urology but he just happened to have an appointment for, was seen and evaluated by the PA who culture the patient's urine, and the patient has a order for an outpatient CT and an appointment for cystoscopy.  Patient states that today he started feeling increasing weakness, nausea vomiting, slight right-sided flank pain and abdominal pain, some chills.  The patient continues to have hematuria and is not feeling well.  The patient denies any fevers.    REVIEW OF SYSTEMS  See HPI for further details. All other systems are negative.     PAST MEDICAL HISTORY  Past Medical History:   Diagnosis Date   • Acute respiratory failure with hypoxia ([facility]) 5/20/2017   • CAD (coronary artery disease)     GIOVANNA to RCA in '97, GIOVANNA X2 to LAD and GIOVANNA X2 to OM in '09   • Cancer ([facility])     2017; chemo lympoma   • Cataract    • Cerebrovascular accident (CVA) ([facility]) 12/30/2016    Left arm weakness  etiology of stroke not established, lymphoma discovered on MRI evaluation of stroke, L hemiparesis much worse after acute infectious illness in mid 2017, but no specific diagnosed recurrent neurological etiology, all at [facility]   • CKD (chronic kidney disease) stage 3, GFR 30-59 ml/min ([facility])    • Controlled gout 2014   • Coronary atherosclerosis of native coronary artery     S/P PTCA (percutaneous transluminal coronary angioplasty), RCA, 5/1997, patent on cath 7/10/2009 at the time of interventions on his left anterior descending and circumflex coronary arteries   • Depression    • Diabetes ([facility])    • Enterococcal septicemia ([facility]) 8/12/2017   • Hypertension    • Hypokalemia 2012    controlled with combination of ACE  inhibitor or ARB plus spironolactone   • Hypomagnesemia 08/12/2017    etiology uncertain   • 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 Barry   • NSTEMI (non-ST elevated myocardial infarction) ([facility]) 07/18/2017    complicating UTI with sepsis   • Pain    • Polyneuropathy in diabetes(357.2) 9/11/2013   • Septic shock ([facility]) 5/20/2017   • Skin ulcer of calf ([facility]) 2015    Right, Dr. Terry and wound care   • Stroke ([facility]) 2016    left sided weakness   • Urinary bladder disorder    • Urinary incontinence    • Wound of left leg 2012    Requiring surgery and debridment, Dr. Moore       FAMILY HISTORY  Family History   Problem Relation Age of Onset   • Heart Disease Father         CAD   • Diabetes Father    • Cancer Mother    • Psychiatric Illness Mother         Depression   • Depression Mother    • Kidney stones Brother    • Heart Disease Brother    • Psychiatric Illness Brother         Depression   • Depression Brother    • Suicide Attempts Other    • Psychiatric Illness Other         autism       SOCIAL HISTORY  Social History     Socioeconomic History   • Marital status:      Spouse name: Not on file   • Number of children: Not on file   • Years of education: Not on file   • Highest education level: Not on file   Occupational History   • Not on file   Social Needs   • Financial resource strain: Not on file   • Food insecurity:     Worry: Not on file     Inability: Not on file   • Transportation needs:     Medical: Not on file     Non-medical: Not on file   Tobacco Use   • Smoking status: Never Smoker   • Smokeless tobacco: Never Used   Substance and Sexual Activity   • Alcohol use: No   • Drug use: No   • Sexual activity: Not Currently     Partners: Female     Comment: , one daughter, 2 grands   Lifestyle   • Physical activity:     Days per week: Not on file     Minutes per session: Not on file   • Stress: Not on  file   Relationships   • Social connections:     Talks on phone: Not on file     Gets together: Not on file     Attends Rastafarian service: Not on file     Active member of club or organization: Not on file     Attends meetings of clubs or organizations: Not on file     Relationship status: Not on file   • Intimate partner violence:     Fear of current or ex partner: Not on file     Emotionally abused: Not on file     Physically abused: Not on file     Forced sexual activity: Not on file   Other Topics Concern   • Not on file   Social History Narrative   • Not on file       SURGICAL HISTORY  Past Surgical History:   Procedure Laterality Date   • CYSTOSCOPY STENT PLACEMENT Left 2/12/2018    Procedure: CYSTOSCOPY  ;  Surgeon: Rey Barry M.D.;  Location: SURGERY [facility];  Service: Urology   • URETEROSCOPY Left 2/12/2018    Procedure: URETEROSCOPY- FLEXIBLE  ;  Surgeon: Rey Barry M.D.;  Location: SURGERY [facility];  Service: Urology   • LASERTRIPSY Left 2/12/2018    Procedure: LASERTRIPSY - LITHO  ;  Surgeon: Rey Barry M.D.;  Location: SURGERY [facility];  Service: Urology   • WOUND CLOSURE GENERAL  4/3/2012    Performed by GERHARD GILBERT at SURGERY SAME DAY [facility]   • ZZZ CARDIAC CATH  2009    Stents to LAD, Om   • DAGOBERTO BY LAPAROSCOPY  1998   • ANGIOPLASTY  1997    RCA followed by other stents as noted above.    • ZZZ CARDIAC CATH  1997    stent RCA   • CATARACT EXTRACTION WITH IOL      bilateral   • LITHOTRIPSY     • TONSILLECTOMY AND ADENOIDECTOMY         CURRENT MEDICATIONS  Home Medications    **Home medications have not yet been reviewed for this encounter**         ALLERGIES  Allergies   Allergen Reactions   • Diphenhydramine Hcl Anxiety     Pt is able to tolerate  Mg benadryl with less anxiety   • Lorazepam Unspecified     Disorientation   • Ciprofloxacin      Rash,stomach ache   • Spironolactone      Acute kidney injury       PHYSICAL EXAM  VITAL SIGNS: /58   Pulse  74   Temp 36.1 °C (97 °F) (Temporal)   Resp 16   SpO2 97%   Constitutional: Well developed, Well nourished, moderate distress, Non-toxic appearance.   HENT: Normocephalic, Atraumatic, Bilateral external ears normal, Oropharynx dry, No oral exudates, Nose normal.   Eyes: PERRLA, EOMI, Conjunctiva normal, No discharge.   Neck: Normal range of motion, No tenderness, Supple, No stridor.   Lymphatic: No lymphadenopathy noted.   Cardiovascular: Normal heart rate, Normal rhythm.   Thorax & Lungs: Normal breath sounds, No respiratory distress, No wheezing, No chest tenderness.   Abdomen: Soft, nontender, no rebound, no peritoneal signs  Skin: Warm, Dry, No erythema, No rash.   Back: No tenderness, No CVA tenderness.   Extremities: Intact distal pulses, No edema, No tenderness.   Neurologic: Chronic left-sided weakness    RADIOLOGY/PROCEDURES  CT-RENAL COLIC EVALUATION(A/P W/O)   Final Result         1.  Ill-defined hyperdensity dependently within the bladder most likely representing hemorrhage given the history of hematuria. Follow-up is recommended.   2.  At least moderate bilateral hydroureteronephrosis.   3.  No urolithiasis.   4.  Indeterminate 17 mm exophytic lesion in the left kidney. Further evaluation with renal ultrasound is recommended.   5.  Severe atherosclerosis.   6.  Trace pleural effusions.                   Results for orders placed or performed during the hospital encounter of 01/21/20   CBC WITH DIFFERENTIAL   Result Value Ref Range    WBC 15.9 (H) 4.8 - 10.8 K/uL    RBC 4.04 (L) 4.70 - 6.10 M/uL    Hemoglobin 11.6 (L) 14.0 - 18.0 g/dL    Hematocrit 35.0 (L) 42.0 - 52.0 %    MCV 86.6 81.4 - 97.8 fL    MCH 28.7 27.0 - 33.0 pg    MCHC 33.1 (L) 33.7 - 35.3 g/dL    RDW 48.4 35.9 - 50.0 fL    Platelet Count 301 164 - 446 K/uL    MPV 9.5 9.0 - 12.9 fL    Neutrophils-Polys 89.20 (H) 44.00 - 72.00 %    Lymphocytes 3.60 (L) 22.00 - 41.00 %    Monocytes 5.80 0.00 - 13.40 %    Eosinophils 0.20 0.00 - 6.90 %     Basophils 0.20 0.00 - 1.80 %    Immature Granulocytes 1.00 (H) 0.00 - 0.90 %    Nucleated RBC 0.00 /100 WBC    Neutrophils (Absolute) 14.16 (H) 1.82 - 7.42 K/uL    Lymphs (Absolute) 0.57 (L) 1.00 - 4.80 K/uL    Monos (Absolute) 0.92 (H) 0.00 - 0.85 K/uL    Eos (Absolute) 0.03 0.00 - 0.51 K/uL    Baso (Absolute) 0.03 0.00 - 0.12 K/uL    Immature Granulocytes (abs) 0.16 (H) 0.00 - 0.11 K/uL    NRBC (Absolute) 0.00 K/uL   COMP METABOLIC PANEL   Result Value Ref Range    Sodium 142 135 - 145 mmol/L    Potassium 4.0 3.6 - 5.5 mmol/L    Chloride 105 96 - 112 mmol/L    Co2 24 20 - 33 mmol/L    Anion Gap 13.0 (H) 0.0 - 11.9    Glucose 105 (H) 65 - 99 mg/dL    Bun 32 (H) 8 - 22 mg/dL    Creatinine 1.83 (H) 0.50 - 1.40 mg/dL    Calcium 9.0 8.4 - 10.2 mg/dL    AST(SGOT) 15 12 - 45 U/L    ALT(SGPT) 14 2 - 50 U/L    Alkaline Phosphatase 64 30 - 99 U/L    Total Bilirubin 0.6 0.1 - 1.5 mg/dL    Albumin 3.5 3.2 - 4.9 g/dL    Total Protein 5.5 (L) 6.0 - 8.2 g/dL    Globulin 2.0 1.9 - 3.5 g/dL    A-G Ratio 1.8 g/dL   LIPASE   Result Value Ref Range    Lipase 29 7 - 58 U/L   ESTIMATED GFR   Result Value Ref Range    GFR If  44 (A) >60 mL/min/1.73 m 2    GFR If Non  36 (A) >60 mL/min/1.73 m 2   LACTIC ACID   Result Value Ref Range    Lactic Acid 1.6 0.5 - 2.0 mmol/L        COURSE & MEDICAL DECISION MAKING  Pertinent Labs & Imaging studies reviewed. (See chart for details)  Patient is coming in secondary to gross hematuria, concern is for pyelonephritis, the patient is having generalized malaise fatigue, chills.  We will get a CT stone study, check a bladder scan determine if the patient needs a catheter.  We will give the patient some IV fluids, nausea medicines.    Patient continued to have urinary retention, will put in a three-way catheter for bladder irrigation.  CT scan does shows in as a exophytic mass, urology will be consulted in the morning, the patient was treated for pyelonephritis.  Discussed  case with Dr. Servin for hospitalization.      FINAL IMPRESSION  1. Gross hematuria    2. Pyelonephritis        Patient referred to primary care provider for blood pressure management    This dictation was created using voice recognition software. The accuracy of the dictation is limited to the abilities of the software. I expect there may be some errors of grammar and possibly content. The nursing notes were reviewed and certain aspects of this information were incorporated into this note.    Electronically signed by: Aristides Clifford M.D., 1/21/2020 8:49 PM

## 2020-01-22 NOTE — ED NOTES
Pt refused pain meds at this time, he stated he has no pain at this time. 1800 cc of red urine discarded.

## 2020-01-22 NOTE — ASSESSMENT & PLAN NOTE
Old stroke with L HP, impaired mobility; can walk short distance with cane/walker and assist but mostly WC bound otherwise.  Good support from wife at home.  Resume ASA/Plavix later when hematuria resolves.

## 2020-01-22 NOTE — ED NOTES
Assisting with care-cbi bags #5 and 6 hung at this time. Total fluid out per previous [id number]. Room assignment recvd, am labs noted and will be drawn prior to transport.

## 2020-01-22 NOTE — H&P
Hospital Medicine History & Physical Note    Date of Service  1/21/2020    Primary Care Physician  Fausto Stephens P.A.-C.    Consultants  None    Code Status  Full    Chief Complaint  Bloody urine    History of Presenting Illness  74 y.o. male who presented 1/21/2020 with hematuria.  Patient states his symptoms started yesterday, was not severe at that time.  He did go see his urologist, had a scheduled appointment, they did try to obtain a urinalysis to evaluate for urinary tract infection however it was too bloody.  Patient has had a recurrent UTI.  They did order a CT scan but it was not obtained yet.  Today he began having malaise, fatigue, nausea, vomiting and chills.  He also began noticing clots in his urine.  He did complain of urgency and frequency but no dysuria or abdominal pain.  I did discuss the case including labs and imaging with the ER physician.    Review of Systems  Review of Systems   Constitutional: Positive for chills and malaise/fatigue. Negative for fever.   HENT: Negative for congestion.    Respiratory: Negative for cough, sputum production, shortness of breath and stridor.    Cardiovascular: Negative for chest pain, palpitations and leg swelling.   Gastrointestinal: Positive for nausea and vomiting. Negative for abdominal pain, constipation and diarrhea.   Genitourinary: Positive for frequency, hematuria and urgency. Negative for dysuria.   Musculoskeletal: Negative for falls and myalgias.   Neurological: Negative for dizziness, tingling, loss of consciousness, weakness and headaches.   Psychiatric/Behavioral: Negative for depression and suicidal ideas.   All other systems reviewed and are negative.      Past Medical History   has a past medical history of Acute respiratory failure with hypoxia ([facility]) (5/20/2017), CAD (coronary artery disease), Cancer ([facility]), Cataract, Cerebrovascular accident (CVA) ([facility]) (12/30/2016), CKD (chronic kidney disease) stage 3, GFR 30-59 ml/min ([facility]), Controlled  gout (2014), Coronary atherosclerosis of native coronary artery, Depression, Diabetes ([facility]), Enterococcal septicemia ([facility]) (8/12/2017), Hypertension, Hypokalemia (2012), Hypomagnesemia (08/12/2017), Lymphoma ([facility]) (2/19/2017), Mixed hyperlipidemia, Nephrolithiasis (2006), NSTEMI (non-ST elevated myocardial infarction) ([facility]) (07/18/2017), Pain, Polyneuropathy in diabetes(357.2) (9/11/2013), Septic shock ([facility]) (5/20/2017), Skin ulcer of calf ([facility]) (2015), Stroke ([facility]) (2016), Urinary bladder disorder, Urinary incontinence, and Wound of left leg (2012). He also has no past medical history of Suicide attempt ([facility]).    Surgical History   has a past surgical history that includes lithotripsy; angioplasty (1997); wound closure general (4/3/2012); tonsillectomy and adenoidectomy; cataract extraction with iol; solo by laparoscopy (1998); cystoscopy stent placement (Left, 2/12/2018); ureteroscopy (Left, 2/12/2018); lasertripsy (Left, 2/12/2018); [facility] cardiac cath (1997); and [facility] cardiac cath (2009).     Family History  family history includes Cancer in his mother; Depression in his brother and mother; Diabetes in his father; Heart Disease in his brother and father; Kidney stones in his brother; Psychiatric Illness in his brother, mother, and another family member; Suicide Attempts in an other family member.     Social History   reports that he has never smoked. He has never used smokeless tobacco. He reports that he does not drink alcohol or use drugs.    Allergies  Allergies   Allergen Reactions   • Diphenhydramine Hcl Anxiety     Pt is able to tolerate  Mg benadryl with less anxiety   • Lorazepam Unspecified     Disorientation   • Ciprofloxacin      Rash,stomach ache   • Spironolactone      Acute kidney injury       Medications  Prior to Admission Medications   Prescriptions Last Dose Informant Patient Reported? Taking?   Cholecalciferol (VITAMIN D) 2000 units Cap  Significant Other Yes No   Sig: Take 2,000 Units by mouth 2  Times a Day.   D-MANNOSE PO   Yes No   Sig: Take 1 tablet by mouth.   DAILY MULTIPLE VITAMINS PO  Significant Other Yes No   Sig: Take 1 Tab by mouth every day.   Dulaglutide (TRULICITY) 1.5 MG/0.5ML Solution Pen-injector   No No   Sig: Inject 1.5 mg as instructed every 7 days. On Tue   Insulin Glargine (TOUJEO MAX SOLOSTAR) 300 UNIT/ML Solution Pen-injector  Significant Other Yes No   Sig: Inject 40 Units as instructed every bedtime.   ONE TOUCH ULTRA TEST strip   No No   Sig: USE TO TEST FIVE TIMES DAILY   acetaminophen (TYLENOL) 500 MG Tab  Significant Other Yes No   Sig: Take 1,000 mg by mouth every 6 hours as needed for Mild Pain.   allopurinol (ZYLOPRIM) 100 MG Tab   No No   Sig: TAKE 1 TABLET BY MOUTH EVERY DAY   ascorbic acid (VITAMIN C) 500 MG tablet  Significant Other Yes No   Sig: Take 500 mg by mouth every day.   aspirin 81 MG tablet  Significant Other Yes No   Sig: Take 81 mg by mouth every day.   atorvastatin (LIPITOR) 80 MG tablet   No No   Sig: Take 1 Tab by mouth every day.   clopidogrel (PLAVIX) 75 MG Tab   No No   Sig: TAKE 1 TABLET BY MOUTH EVERY DAY   fenofibrate (TRICOR) 145 MG Tab   No No   Sig: Take 1 Tab by mouth every day.   fluoxetine (PROZAC) 40 MG capsule  Significant Other No No   Sig: TAKE 1 CAPSULE BY MOUTH EVERY DAY   insulin lispro, Human, (HUMALOG KWIKPEN) 100 UNIT/ML Solution Pen-injector injection  Significant Other Yes No   Sig: Inject 5-9 Units as instructed 3 times a day before meals. Sliding Scale   losartan (COZAAR) 50 MG Tab   No No   Sig: Take 1 Tab by mouth every day.   magnesium oxide (MAG-OX) 400 (241.3 Mg) MG Tab tablet  Significant Other Yes No   Sig: Take 800 mg by mouth 2 times a day.   metoprolol (TOPROL-XL) 200 MG XL tablet  Significant Other No No   Sig: Take 1 Tab by mouth every evening.   potassium chloride (KLOR-CON) 8 MEQ tablet  Significant Other No No   Sig: Take 1 Tab by mouth every day.   saccharomyces boulardii (FLORASTOR) 250 MG Cap   Yes No   Sig: Take  250 mg by mouth 3 times a day, with meals.      Facility-Administered Medications: None       Physical Exam  Temp:  [36.1 °C (97 °F)] 36.1 °C (97 °F)  Pulse:  [74-83] 83  Resp:  [16-25] 24  BP: (107-147)/() 147/106  SpO2:  [96 %-98 %] 96 %    Physical Exam  Vitals signs and nursing note reviewed.   Constitutional:       General: He is not in acute distress.     Appearance: He is well-developed. He is not toxic-appearing or diaphoretic.   HENT:      Head: Normocephalic and atraumatic.      Right Ear: External ear normal.      Left Ear: External ear normal.      Nose: Nose normal. No congestion or rhinorrhea.      Mouth/Throat:      Mouth: Mucous membranes are dry.      Pharynx: No oropharyngeal exudate.   Eyes:      General: No scleral icterus.        Right eye: No discharge.         Left eye: No discharge.      Extraocular Movements: Extraocular movements intact.   Neck:      Musculoskeletal: Normal range of motion and neck supple. No edema or erythema.      Trachea: No tracheal deviation.   Cardiovascular:      Rate and Rhythm: Normal rate and regular rhythm.      Heart sounds: No murmur. No friction rub. No gallop.    Pulmonary:      Effort: Pulmonary effort is normal. No respiratory distress.      Breath sounds: Normal breath sounds. No stridor. No wheezing or rales.   Chest:      Chest wall: No tenderness.   Abdominal:      General: Bowel sounds are normal. There is no distension.      Palpations: Abdomen is soft.      Tenderness: There is no tenderness.   Musculoskeletal: Normal range of motion.         General: No tenderness.      Right lower leg: No edema.      Left lower leg: No edema.   Lymphadenopathy:      Cervical: No cervical adenopathy.   Skin:     General: Skin is warm and dry.      Coloration: Skin is not jaundiced.      Findings: No erythema or rash.   Neurological:      Mental Status: He is alert and oriented to person, place, and time.      Cranial Nerves: No cranial nerve deficit.    Psychiatric:         Mood and Affect: Mood normal.         Behavior: Behavior normal.         Thought Content: Thought content normal.         Judgment: Judgment normal.         Laboratory:  Recent Labs     01/21/20 1925   WBC 15.9*   RBC 4.04*   HEMOGLOBIN 11.6*   HEMATOCRIT 35.0*   MCV 86.6   MCH 28.7   MCHC 33.1*   RDW 48.4   PLATELETCT 301   MPV 9.5     Recent Labs     01/21/20 1925   SODIUM 142   POTASSIUM 4.0   CHLORIDE 105   CO2 24   GLUCOSE 105*   BUN 32*   CREATININE 1.83*   CALCIUM 9.0     Recent Labs     01/21/20 1925   ALTSGPT 14   ASTSGOT 15   ALKPHOSPHAT 64   TBILIRUBIN 0.6   LIPASE 29   GLUCOSE 105*         No results for input(s): NTPROBNP in the last 72 hours.      No results for input(s): TROPONINT in the last 72 hours.    Urinalysis:    No results found     Imaging:  CT-RENAL COLIC EVALUATION(A/P W/O)   Final Result         1.  Ill-defined hyperdensity dependently within the bladder most likely representing hemorrhage given the history of hematuria. Follow-up is recommended.   2.  At least moderate bilateral hydroureteronephrosis.   3.  No urolithiasis.   4.  Indeterminate 17 mm exophytic lesion in the left kidney. Further evaluation with renal ultrasound is recommended.   5.  Severe atherosclerosis.   6.  Trace pleural effusions.               US-RENAL    (Results Pending)         Assessment/Plan:  I anticipate this patient will require at least two midnights for appropriate medical management, necessitating inpatient admission.    * Hematuria- (present on admission)  Assessment & Plan  -Profound hematuria, start CBI and continue overnight  -He may need urology consultation in the morning, make n.p.o. at midnight  -There was a renal mass noted, obtain renal ultrasound  -Hold home Plavix and aspirin    Sepsis (HCC)- (present on admission)  Assessment & Plan  -This is Sepsis Present on admission  SIRS criteria identified on my evaluation include: Tachypnea, with respirations greater than 20  per minute and Leukocyosis, with WBC greater than 12,000  Source is presumed UTI  Sepsis protocol initiated  Fluid resuscitation ordered per protocol  IV antibiotics as appropriate for source of sepsis  While organ dysfunction may be noted elsewhere in this problem list or in the chart, degree of organ dysfunction does not meet CMS criteria for severe sepsis  -Urine is too bloody to obtain an accurate urinalysis, he does have recurrent urinary tract infections  -No other complaints to suggest infection from a different organ system  -Start IV Rocephin  -Start IV fluids  -Trend lactic acid  -Patient is at high risk of worsening and does require close monitoring    ([facility]) Paroxysmal atrial fibrillation- (present on admission)  Assessment & Plan  -Currently sinus on home metoprolol, continue  -Patient is on Plavix and aspirin at home, hold due to hematuria    Type 2 diabetes mellitus, with long-term current use of insulin ([facility])- (present on admission)  Assessment & Plan  -Controlled glucose at this time, patient will be n.p.o. at midnight, start insulin sliding scale  -Hold home Lantus    Hydronephrosis- (present on admission)  Assessment & Plan  -Noted on CT scan, will be further evaluated with renal ultrasound    Acute on chronic renal failure (HCC)- (present on admission)  Assessment & Plan  -Due to hydronephrosis and dehydration  -Start IV fluids  -Repeat BMP in the morning    Normocytic anemia- (present on admission)  Assessment & Plan  -With significant bleeding from hematuria  -Repeat CBC in the morning    Renal mass- (present on admission)  Assessment & Plan  -Noted on CT scan, renal ultrasound pending  -He did have a renal ultrasound in December that did not show a mass  -I did personally review his CT scan, noted left kidney mass    (HCC) Moderate episode of recurrent major depressive disorder- (present on admission)  Assessment & Plan  -Continue Prozac      VTE prophylaxis: SCDs

## 2020-01-22 NOTE — ED TRIAGE NOTES
Chief Complaint   Patient presents with   • Blood in Urine     dark red with clots x 2 days   • Weakness   • N/V     Pt pale, reports reoccurring UTI's for last few months. Reports that he saw urology yesterday but today feels worse with nausea/vomiting.  /62   Pulse 77   Temp 36.1 °C (97 °F) (Temporal)   Resp 16   SpO2 98%   Pt informed of wait times. Educated on triage process.  Asked to return to triage RN for any new or worsening of symptoms. Thanked for patience.

## 2020-01-22 NOTE — ED NOTES
Khyn=[id number], am labs drawn at this time as well as yu emptied of 800cc pink urine with minimal sediment noted

## 2020-01-22 NOTE — ASSESSMENT & PLAN NOTE
-Noted on CT scan, will be further evaluated with renal ultrasound  Mod bilat hydro per US, apparently chronic per wife's report.  Urology consult.

## 2020-01-22 NOTE — ED NOTES
Med rec updated and complete  Allergies reviewed  Pt asked for me to call his wife (Usha) to verify all prescription medications.  Called wife (Usha) @ [phone] to verify all pts medications.  Pts wife reports no antibiotics in the last 2 weeks

## 2020-01-22 NOTE — ASSESSMENT & PLAN NOTE
-Currently sinus on home metoprolol, continue  -Patient is on Plavix and aspirin at home, hold due to hematuria

## 2020-01-22 NOTE — PROGRESS NOTES
Attending hospitalist for today 1/22 will be Dr. Hernandez at [phone].   Hospitalist RN available for this patient via tiger text.    Gardenia Thomas, LETICIA  Hospitalist Service

## 2020-01-22 NOTE — ASSESSMENT & PLAN NOTE
-Due to hydronephrosis and dehydration  -Start IV fluids  -Repeat BMP in the morning  Avoid nephrotoxic meds

## 2020-01-22 NOTE — CARE PLAN
Problem: Communication  Goal: The ability to communicate needs accurately and effectively will improve  Outcome: PROGRESSING AS EXPECTED     Problem: Safety  Goal: Will remain free from injury  Outcome: PROGRESSING AS EXPECTED     Problem: Knowledge Deficit  Goal: Knowledge of disease process/condition, treatment plan, diagnostic tests, and medications will improve  Outcome: PROGRESSING AS EXPECTED     Problem: Fluid Volume:  Goal: Will maintain balanced intake and output  Outcome: PROGRESSING AS EXPECTED

## 2020-01-22 NOTE — ASSESSMENT & PLAN NOTE
-This is Sepsis Present on admission  SIRS criteria identified on my evaluation include: Tachypnea, with respirations greater than 20 per minute and Leukocyosis, with WBC greater than 12,000  Source is presumed UTI  Sepsis protocol initiated  Fluid resuscitation ordered per protocol  IV antibiotics as appropriate for source of sepsis  While organ dysfunction may be noted elsewhere in this problem list or in the chart, degree of organ dysfunction does not meet CMS criteria for severe sepsis  -No other complaints to suggest infection from a different organ system    Urine grew klesiella sensitive to rocephin and other abx's; resume rocephin and change to po abx at discharge..  Clinically improved.

## 2020-01-23 LAB
ANION GAP SERPL CALC-SCNC: 11 MMOL/L (ref 0–11.9)
BACTERIA UR CULT: ABNORMAL
BACTERIA UR CULT: ABNORMAL
BASOPHILS # BLD AUTO: 0.2 % (ref 0–1.8)
BASOPHILS # BLD: 0.02 K/UL (ref 0–0.12)
BUN SERPL-MCNC: 30 MG/DL (ref 8–22)
CALCIUM SERPL-MCNC: 8.3 MG/DL (ref 8.4–10.2)
CHLORIDE SERPL-SCNC: 104 MMOL/L (ref 96–112)
CO2 SERPL-SCNC: 23 MMOL/L (ref 20–33)
CREAT SERPL-MCNC: 1.68 MG/DL (ref 0.5–1.4)
EOSINOPHIL # BLD AUTO: 0.17 K/UL (ref 0–0.51)
EOSINOPHIL NFR BLD: 1.3 % (ref 0–6.9)
ERYTHROCYTE [DISTWIDTH] IN BLOOD BY AUTOMATED COUNT: 50.4 FL (ref 35.9–50)
GLUCOSE BLD-MCNC: 109 MG/DL (ref 65–99)
GLUCOSE BLD-MCNC: 117 MG/DL (ref 65–99)
GLUCOSE BLD-MCNC: 130 MG/DL (ref 65–99)
GLUCOSE BLD-MCNC: 147 MG/DL (ref 65–99)
GLUCOSE BLD-MCNC: 69 MG/DL (ref 65–99)
GLUCOSE BLD-MCNC: 73 MG/DL (ref 65–99)
GLUCOSE SERPL-MCNC: 71 MG/DL (ref 65–99)
HCT VFR BLD AUTO: 29.6 % (ref 42–52)
HGB BLD-MCNC: 9.6 G/DL (ref 14–18)
IMM GRANULOCYTES # BLD AUTO: 0.07 K/UL (ref 0–0.11)
IMM GRANULOCYTES NFR BLD AUTO: 0.5 % (ref 0–0.9)
LYMPHOCYTES # BLD AUTO: 0.61 K/UL (ref 1–4.8)
LYMPHOCYTES NFR BLD: 4.6 % (ref 22–41)
MCH RBC QN AUTO: 28.6 PG (ref 27–33)
MCHC RBC AUTO-ENTMCNC: 32.4 G/DL (ref 33.7–35.3)
MCV RBC AUTO: 88.1 FL (ref 81.4–97.8)
MONOCYTES # BLD AUTO: 0.83 K/UL (ref 0–0.85)
MONOCYTES NFR BLD AUTO: 6.3 % (ref 0–13.4)
NEUTROPHILS # BLD AUTO: 11.57 K/UL (ref 1.82–7.42)
NEUTROPHILS NFR BLD: 87.1 % (ref 44–72)
NRBC # BLD AUTO: 0 K/UL
NRBC BLD-RTO: 0 /100 WBC
PLATELET # BLD AUTO: 234 K/UL (ref 164–446)
PMV BLD AUTO: 9.9 FL (ref 9–12.9)
POTASSIUM SERPL-SCNC: 3.8 MMOL/L (ref 3.6–5.5)
RBC # BLD AUTO: 3.36 M/UL (ref 4.7–6.1)
SIGNIFICANT IND 70042: ABNORMAL
SITE SITE: ABNORMAL
SODIUM SERPL-SCNC: 138 MMOL/L (ref 135–145)
SOURCE SOURCE: ABNORMAL
WBC # BLD AUTO: 13.3 K/UL (ref 4.8–10.8)

## 2020-01-23 PROCEDURE — 700102 HCHG RX REV CODE 250 W/ 637 OVERRIDE(OP): Performed by: INTERNAL MEDICINE

## 2020-01-23 PROCEDURE — 80048 BASIC METABOLIC PNL TOTAL CA: CPT

## 2020-01-23 PROCEDURE — 700102 HCHG RX REV CODE 250 W/ 637 OVERRIDE(OP): Performed by: UROLOGY

## 2020-01-23 PROCEDURE — 700111 HCHG RX REV CODE 636 W/ 250 OVERRIDE (IP): Performed by: INTERNAL MEDICINE

## 2020-01-23 PROCEDURE — 700105 HCHG RX REV CODE 258: Performed by: INTERNAL MEDICINE

## 2020-01-23 PROCEDURE — 82962 GLUCOSE BLOOD TEST: CPT | Mod: 91

## 2020-01-23 PROCEDURE — 85025 COMPLETE CBC W/AUTO DIFF WBC: CPT

## 2020-01-23 PROCEDURE — A9270 NON-COVERED ITEM OR SERVICE: HCPCS | Performed by: UROLOGY

## 2020-01-23 PROCEDURE — A9270 NON-COVERED ITEM OR SERVICE: HCPCS | Performed by: INTERNAL MEDICINE

## 2020-01-23 PROCEDURE — 36415 COLL VENOUS BLD VENIPUNCTURE: CPT

## 2020-01-23 PROCEDURE — 99232 SBSQ HOSP IP/OBS MODERATE 35: CPT | Performed by: INTERNAL MEDICINE

## 2020-01-23 PROCEDURE — 770006 HCHG ROOM/CARE - MED/SURG/GYN SEMI*

## 2020-01-23 RX ORDER — FINASTERIDE 5 MG/1
5 TABLET, FILM COATED ORAL DAILY
Status: DISCONTINUED | OUTPATIENT
Start: 2020-01-23 | End: 2020-01-24 | Stop reason: HOSPADM

## 2020-01-23 RX ADMIN — ATORVASTATIN CALCIUM 80 MG: 40 TABLET, FILM COATED ORAL at 22:07

## 2020-01-23 RX ADMIN — Medication 800 MG: at 10:53

## 2020-01-23 RX ADMIN — FLUOXETINE 40 MG: 20 CAPSULE ORAL at 06:11

## 2020-01-23 RX ADMIN — METOPROLOL SUCCINATE 200 MG: 100 TABLET, EXTENDED RELEASE ORAL at 16:16

## 2020-01-23 RX ADMIN — CEFTRIAXONE SODIUM 1 G: 1 INJECTION, POWDER, FOR SOLUTION INTRAMUSCULAR; INTRAVENOUS at 13:07

## 2020-01-23 RX ADMIN — FENOFIBRATE 134 MG: 134 CAPSULE ORAL at 06:11

## 2020-01-23 RX ADMIN — PIPERACILLIN AND TAZOBACTAM 3.38 G: 3; .375 INJECTION, POWDER, LYOPHILIZED, FOR SOLUTION INTRAVENOUS; PARENTERAL at 06:11

## 2020-01-23 RX ADMIN — FINASTERIDE 5 MG: 5 TABLET, FILM COATED ORAL at 13:07

## 2020-01-23 RX ADMIN — Medication 800 MG: at 22:07

## 2020-01-23 ASSESSMENT — ENCOUNTER SYMPTOMS
FEVER: 0
MYALGIAS: 0
COUGH: 0
SHORTNESS OF BREATH: 0
VOMITING: 0
NAUSEA: 0
FLANK PAIN: 0
BLURRED VISION: 0
HEARTBURN: 0
CHILLS: 0

## 2020-01-23 NOTE — CONSULTS
[facility] Consult/H&P Note    Primary Service:   Attending: Jean Hernandez M.D.  Patient's Name/MRN: Av Bob, [id number]    Admit Date:1/21/2020  Today's Date: 1/23/2020   Length of stay:  LOS: 2 days   Room #: 2202/01      Reason for consult/chief complaint: hematuria, hydronephrosis, UTI  ID/HPI: Av Bob is a 74 y.o. male patient known to [facility] as a patient of Melchor Rosales. Seen on Monday in the office for evaluation of gross hematuria. Notes this had never happened previously but did have a history of incomplete bladder emptying, hydronephrosis and recurrent UTIs. Was started on alfuzosin about 1 month ago. He denies any other urinary symptoms beyond the bleeding. Scheduled for cystoscopy in 1 month with our practice. Catheter was placed in the ER with CBI running. Reports urine was light pink last night but clear today. CT scan was performed showing moderate bilateral hydroureteronephrosis, debris within the bladder and 1.7cm exophytic lesion of the left kidney. Renal ultrasound revealed simple cyst.  Urology consulted. No complaints currently.        Past Medical History:   Past Medical History:   Diagnosis Date   • Acute respiratory failure with hypoxia ([facility]) 5/20/2017   • CAD (coronary artery disease)     GIOVANNA to RCA in '97, GIOVANNA X2 to LAD and GIOVANNA X2 to OM in '09   • Cancer ([facility])     2017; chemo lympoma   • Cataract    • Cerebrovascular accident (CVA) ([facility]) 12/30/2016    Left arm weakness  etiology of stroke not established, lymphoma discovered on MRI evaluation of stroke, L hemiparesis much worse after acute infectious illness in mid 2017, but no specific diagnosed recurrent neurological etiology, all at [facility]   • CKD (chronic kidney disease) stage 3, GFR 30-59 ml/min ([facility])    • Controlled gout 2014   • Coronary atherosclerosis of native coronary artery     S/P PTCA (percutaneous transluminal coronary angioplasty), RCA, 5/1997,  patent on cath 7/10/2009 at the time of interventions on his left anterior descending and circumflex coronary arteries   • Depression    • Diabetes ([facility])    • Enterococcal septicemia ([facility]) 8/12/2017   • Hypertension    • Hypokalemia 2012    controlled with combination of ACE inhibitor or ARB plus spironolactone   • Hypomagnesemia 08/12/2017    etiology uncertain   • Lymphoma ([facility]) 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 Barry   • NSTEMI (non-ST elevated myocardial infarction) ([facility]) 07/18/2017    complicating UTI with sepsis   • Pain    • Polyneuropathy in diabetes(357.2) 9/11/2013   • Septic shock ([facility]) 5/20/2017   • Skin ulcer of calf ([facility]) 2015    Right, Dr. Terry and wound care   • Stroke ([facility]) 2016    left sided weakness   • Urinary bladder disorder    • Urinary incontinence    • Wound of left leg 2012    Requiring surgery and debridment, Dr. Moore        Past Surgical History:   Past Surgical History:   Procedure Laterality Date   • CYSTOSCOPY STENT PLACEMENT Left 2/12/2018    Procedure: CYSTOSCOPY  ;  Surgeon: Rey Barry M.D.;  Location: SURGERY [facility];  Service: Urology   • URETEROSCOPY Left 2/12/2018    Procedure: URETEROSCOPY- FLEXIBLE  ;  Surgeon: Rey Barry M.D.;  Location: SURGERY [facility];  Service: Urology   • LASERTRIPSY Left 2/12/2018    Procedure: LASERTRIPSY - LITHO  ;  Surgeon: Rey Barry M.D.;  Location: SURGERY [facility];  Service: Urology   • WOUND CLOSURE GENERAL  4/3/2012    Performed by GERHARD MOORE at SURGERY SAME DAY [facility]   • Z CARDIAC CATH  2009    Stents to LAD, Om   • DAGOBERTO BY LAPAROSCOPY  1998   • ANGIOPLASTY  1997    RCA followed by other stents as noted above.    • ZZZ CARDIAC CATH  1997    stent RCA   • CATARACT EXTRACTION WITH IOL      bilateral   • LITHOTRIPSY     • TONSILLECTOMY AND ADENOIDECTOMY          Family History:   Family History   Problem  Relation Age of Onset   • Heart Disease Father         CAD   • Diabetes Father    • Cancer Mother    • Psychiatric Illness Mother         Depression   • Depression Mother    • Kidney stones Brother    • Heart Disease Brother    • Psychiatric Illness Brother         Depression   • Depression Brother    • Suicide Attempts Other    • Psychiatric Illness Other         autism         Social History:   Social History     Tobacco Use   • Smoking status: Never Smoker   • Smokeless tobacco: Never Used   Substance Use Topics   • Alcohol use: No   • Drug use: No      Social History     Patient does not qualify to have social determinant information on file (likely too young).   Social History Narrative   • Not on file        Allergies: he Diphenhydramine hcl; Lorazepam; Ciprofloxacin; and Spironolactone    Medications:   Medications Prior to Admission   Medication Sig Dispense Refill Last Dose   • allopurinol (ZYLOPRIM) 100 MG Tab Take 100 mg by mouth every evening.   1/20/2020 at 1900   • atorvastatin (LIPITOR) 80 MG tablet Take 80 mg by mouth every evening.   1/20/2020 at 1900   • clopidogrel (PLAVIX) 75 MG Tab Take 75 mg by mouth every evening.   1/20/2020 at 1900   • fenofibrate (TRICOR) 145 MG Tab Take 145 mg by mouth every evening.   1/20/2020 at 1900   • fluoxetine (PROZAC) 40 MG capsule Take 40 mg by mouth every day.   1/21/2020 at 0900   • alfuzosin (UROXATRAL) 10 MG SR tablet Take 10 mg by mouth every day.   1/21/2020 at 0900   • methenamine hip (HIPPREX) 1 GM Tab Take 1 g by mouth 2 times a day.   1/21/2020 at 0900   • D-MANNOSE PO Take 1 tablet by mouth 2 Times a Day.   1/21/2020 at 0900   • saccharomyces boulardii (FLORASTOR) 250 MG Cap Take 250 mg by mouth 2 Times a Day.   1/21/2020 at 0900   • losartan (COZAAR) 50 MG Tab Take 1 Tab by mouth every day. 90 Tab 3 1/21/2020 at 0900   • Dulaglutide (TRULICITY) 1.5 MG/0.5ML Solution Pen-injector Inject 1.5 mg as instructed every 7 days. On Tue 12 PEN 3 1/21/2020 at  1200   • magnesium oxide (MAG-OX) 400 (241.3 Mg) MG Tab tablet Take 800 mg by mouth 2 times a day.   1/21/2020 at 0900   • Insulin Glargine (TOUJEO MAX SOLOSTAR) 300 UNIT/ML Solution Pen-injector Inject 40 Units as instructed every bedtime.   1/20/2020 at 1900   • insulin lispro, Human, (HUMALOG KWIKPEN) 100 UNIT/ML Solution Pen-injector injection Inject 5-9 Units as instructed 3 times a day before meals. Sliding Scale   1/21/2020 at 1600   • potassium chloride (KLOR-CON) 8 MEQ tablet Take 1 Tab by mouth every day. 90 Tab 3 1/21/2020 at 0900   • metoprolol (TOPROL-XL) 200 MG XL tablet Take 1 Tab by mouth every evening. 90 Tab 3 1/20/2020 at 1900   • DAILY MULTIPLE VITAMINS PO Take 1 Tab by mouth every day.   1/21/2020 at 0900   • acetaminophen (TYLENOL) 500 MG Tab Take 1,000 mg by mouth every 6 hours as needed for Mild Pain or Moderate Pain.   1/20/2020 at AM   • ascorbic acid (VITAMIN C) 500 MG tablet Take 500 mg by mouth every day.   1/21/2020 at 0900   • Cholecalciferol (VITAMIN D) 2000 units Cap Take 4,000 Units by mouth 2 Times a Day.   1/21/2020 at 0900   • aspirin 81 MG tablet Take 81 mg by mouth every day.   1/21/2020 at 0900         Review of Systems  Review of Systems   Constitutional: Negative for chills and fever.   Respiratory: Negative for shortness of breath.    Cardiovascular: Negative for chest pain.   Gastrointestinal: Negative for nausea and vomiting.   Genitourinary: Positive for hematuria. Negative for dysuria, flank pain, frequency and urgency.   All other systems reviewed and are negative.       Physical Exam  VITAL SIGNS: /58   Pulse 75   Temp 36.7 °C (98 °F) (Oral)   Resp 18   Ht 1.829 m (6')   Wt 93 kg (205 lb)   SpO2 96%   BMI 27.80 kg/m²   Physical Exam   Constitutional: He is oriented to person, place, and time and well-developed, well-nourished, and in no distress.   HENT:   Head: Normocephalic and atraumatic.   Eyes: Pupils are equal, round, and reactive to light. EOM are  normal.   Neck: Normal range of motion. Neck supple.   Pulmonary/Chest: Effort normal.   Abdominal: Soft.   Genitourinary:    Genitourinary Comments: Michaels in place and draining, low flow CBI with clear urine     Musculoskeletal: Normal range of motion.   Neurological: He is alert and oriented to person, place, and time.   Skin: Skin is warm and dry.   Psychiatric: Mood, memory, affect and judgment normal.   Nursing note and vitals reviewed.        Labs:  Recent Labs     01/21/20 1925 01/22/20  1158 01/23/20  0338   WBC 15.9* 17.4* 13.3*   RBC 4.04* 3.65* 3.36*   HEMOGLOBIN 11.6* 10.5* 9.6*   HEMATOCRIT 35.0* 32.5* 29.6*   MCV 86.6 89.0 88.1   MCH 28.7 28.8 28.6   MCHC 33.1* 32.3* 32.4*   RDW 48.4 50.4* 50.4*   PLATELETCT 301 236 234   MPV 9.5 10.2 9.9     Recent Labs     01/21/20 1925 01/22/20  1235 01/23/20  0338   SODIUM 142 140 138   POTASSIUM 4.0 4.5 3.8   CHLORIDE 105 106 104   CO2 24 24 23   GLUCOSE 105* 127* 71   BUN 32* 31* 30*   CREATININE 1.83* 1.88* 1.68*   CALCIUM 9.0 8.4 8.3*     Recent Labs     01/22/20  1235   INR 1.19*     Glucose:  Recent Labs     01/21/20 1925 01/22/20  1235 01/23/20  0338   GLUCOSE 105* 127* 71     Coags:  Recent Labs     01/22/20  1235   INR 1.19*         Urinalysis:       Imaging:   CT renal colic 1/21/2020  IMPRESSION:        1.  Ill-defined hyperdensity dependently within the bladder most likely representing hemorrhage given the history of hematuria. Follow-up is recommended.  2.  At least moderate bilateral hydroureteronephrosis.  3.  No urolithiasis.  4.  Indeterminate 17 mm exophytic lesion in the left kidney. Further evaluation with renal ultrasound is recommended.  5.  Severe atherosclerosis.  6.  Trace pleural effusions    Renal ultrasound 1/22/2020     IMPRESSION:     1.  Moderate bilateral hydronephrosis.  2.  1.7 cm exophytic simple appearing left renal cyst. No follow up imaging is recommended per consensus guidelines of the 2019 ACR Incidental Findings Committee  for probably benign incidental simple appearing renal cystic lesion(s) based on imaging   criteria.                                           Assessment/Recommendation   74 y.o.male with sepsis, UTI, bilateral hydronephrosis, CATA on CKD, gross hematuria    · CT reveals moderate bilateral hydronephrosis and debris within the bladder, left renal cyst classified as simple on subsequent ultrasound  · CBI clamped at bedside today, to continue to monitor and titrate to light pink to clear.   · Hand irrigate prn clot, decreased urinary output  · Recommend home on antibiotics  · Finasteride ordered, recommend home on finasteride  · Will need outpatient cystoscopy, which [facility] will arrange  · abx per medicine and cultures  · If urine remains clear overnight, urology okay with discharge when medically cleared     This case discussed with Dr. Mathew and he is in agreement with aforementioned plan.       Bart Hull PSAMANTHA.-C.   [id number]  [place] [id number]   [phone]

## 2020-01-23 NOTE — DISCHARGE PLANNING
Care Transition Team Assessment  NOK: Usha Bob   [phone]  LSW met with pt and spouse at bedside. Pt alert and oriented x4. Pt very pleasant. LSW verified demographic information and preferred pharmacy. PCP on file. AD on file. Pt denies hx with substance abuse or mental health issues. Pt stated that he uses a wheelchair, cane, and a walker but is otherwise is independent with most ADLs. Pt plans to discharge home with help and support from his spouse.     Information Source  Orientation : Oriented x 4  Information Given By: Patient  Informant's Name: Av Bob  Who is responsible for making decisions for patient? : Patient    Readmission Evaluation  Is this a readmission?: Yes - unplanned readmission    Elopement Risk  Legal Hold: No  Ambulatory or Self Mobile in Wheelchair: No-Not an Elopement Risk  Disoriented: No  Psychiatric Symptoms: None  History of Wandering: No  Elopement this Admit: No  Vocalizing Wanting to Leave: No  Displays Behaviors, Body Language Wanting to Leave: No-Not at Risk for Elopement  Elopement Risk: Not at Risk for Elopement    Interdisciplinary Discharge Planning  Patient or legal guardian wants to designate a caregiver (see row info): No    Discharge Preparedness  What is your plan after discharge?: Home with help  What are your discharge supports?: Spouse  Prior Functional Level: Uses Cane, Uses Walker, Uses Wheelchair, Independent with Medication Management  Difficulity with ADLs: Walking  Difficulity with IADLs: None    Functional Assesment  Prior Functional Level: Uses Cane, Uses Walker, Uses Wheelchair, Independent with Medication Management    Finances  Financial Barriers to Discharge: No  Prescription Coverage: Yes    Vision / Hearing Impairment  Vision Impairment : Yes  Right Eye Vision: Impaired, Wears Glasses  Left Eye Vision: Impaired, Wears Glasses  Hearing Impairment : No         Advance Directive  Advance Directive?: POLST    Domestic Abuse  Have you ever been  the victim of abuse or violence?: No  Physical Abuse or Sexual Abuse: No  Verbal Abuse or Emotional Abuse: No  Possible Abuse Reported to:: Not Applicable    Psychological Assessment  History of Substance Abuse: None  History of Psychiatric Problems: No  Non-compliant with Treatment: No  Newly Diagnosed Illness: No    Discharge Risks or Barriers  Discharge risks or barriers?: No    Anticipated Discharge Information  Anticipated discharge disposition: Home  Discharge Address: [address] [place] [address] [address] [place]  Discharge Contact Phone Number: [phone]

## 2020-01-23 NOTE — PROGRESS NOTES
Patient alert and oriented, vss, denies pain. Urine draining with CBI clear and yellow. Patient noted to have red coccyx with small tear to gluteal fold. mepilex placed for prevention. Heels elevated on pillow. 2nd toe of L foot with small scab to tip of toe with band aid in place. Patient with UA needed to be collected however patient now with yu and would have to replace yu for urine sample. Will have RN ask day MD if this is necessary to do and possibly patient will have yu removed tomorrow? Will monitor.

## 2020-01-23 NOTE — CARE PLAN
Problem: Communication  Goal: The ability to communicate needs accurately and effectively will improve  Outcome: PROGRESSING AS EXPECTED     Problem: Safety  Goal: Will remain free from injury  Outcome: PROGRESSING AS EXPECTED  Note:   Patient safe and free from injruy, non skid socks on and bed alarm in use.      Problem: Venous Thromboembolism (VTW)/Deep Vein Thrombosis (DVT) Prevention:  Goal: Patient will participate in Venous Thrombosis (VTE)/Deep Vein Thrombosis (DVT)Prevention Measures  Outcome: PROGRESSING AS EXPECTED  Note:   SCDs in place as patient plavix and ASA on hold for hematuria.

## 2020-01-23 NOTE — PROGRESS NOTES
Hospital Medicine Daily Progress Note    Date of Service  1/23/2020    Chief Complaint  74 y.o. male admitted 1/21/2020 with hematuria for 2 days, new onset    Hospital Course    started on CBI and urine culture sent, growing gram negative bacteria.  Also has CATA on CKD treated with IVF.  On empiric IV abx.      Interval Problem Update  Feels better; no new c/o; hematuria has cleared    Consultants/Specialty  urology    Code Status  full    Disposition  Home when stable    Review of Systems  Review of Systems   Constitutional: Negative for fever.   HENT: Negative for hearing loss.    Eyes: Negative for blurred vision.   Respiratory: Negative for cough.    Cardiovascular: Negative for chest pain.   Gastrointestinal: Negative for heartburn.   Genitourinary: Positive for hematuria.   Musculoskeletal: Negative for myalgias.   Skin: Negative for rash.        Physical Exam  Temp:  [36.7 °C (98 °F)-37 °C (98.6 °F)] 36.7 °C (98 °F)  Pulse:  [65-79] 75  Resp:  [16-18] 18  BP: (108-138)/(52-75) 116/58  SpO2:  [95 %-98 %] 96 %    Physical Exam  HENT:      Head: Normocephalic.      Nose: Nose normal.      Mouth/Throat:      Mouth: Mucous membranes are moist.   Eyes:      Extraocular Movements: Extraocular movements intact.   Neck:      Musculoskeletal: Normal range of motion and neck supple.   Cardiovascular:      Rate and Rhythm: Normal rate and regular rhythm.      Pulses: Normal pulses.      Heart sounds: Normal heart sounds.   Pulmonary:      Effort: Pulmonary effort is normal.      Breath sounds: Normal breath sounds.   Abdominal:      General: Bowel sounds are normal.      Palpations: Abdomen is soft.      Tenderness: There is no tenderness.   Musculoskeletal:      Right lower leg: Edema present.      Left lower leg: Edema present.      Comments: 2+ edema RLE, 1+ LLE   Skin:     General: Skin is warm.   Neurological:      Mental Status: He is alert.      Comments: Old L HP 3/5 LUE, 3-4/5 LLE   Psychiatric:         Mood  and Affect: Mood normal.         Fluids    Intake/Output Summary (Last 24 hours) at 1/23/2020 1247  Last data filed at 1/23/2020 0800  Gross per 24 hour   Intake 1189 ml   Output 800 ml   Net 389 ml       Laboratory  Recent Labs     01/21/20  1925 01/22/20  1158 01/23/20  0338   WBC 15.9* 17.4* 13.3*   RBC 4.04* 3.65* 3.36*   HEMOGLOBIN 11.6* 10.5* 9.6*   HEMATOCRIT 35.0* 32.5* 29.6*   MCV 86.6 89.0 88.1   MCH 28.7 28.8 28.6   MCHC 33.1* 32.3* 32.4*   RDW 48.4 50.4* 50.4*   PLATELETCT 301 236 234   MPV 9.5 10.2 9.9     Recent Labs     01/21/20 1925 01/22/20  1235 01/23/20  0338   SODIUM 142 140 138   POTASSIUM 4.0 4.5 3.8   CHLORIDE 105 106 104   CO2 24 24 23   GLUCOSE 105* 127* 71   BUN 32* 31* 30*   CREATININE 1.83* 1.88* 1.68*   CALCIUM 9.0 8.4 8.3*     Recent Labs     01/22/20  1235   INR 1.19*               Imaging  US-EXTREMITY VENOUS LOWER UNILAT RIGHT   Final Result      US-RENAL   Final Result      1.  Moderate bilateral hydronephrosis.   2.  1.7 cm exophytic simple appearing left renal cyst. No follow up imaging is recommended per consensus guidelines of the 2019 ACR Incidental Findings Committee for probably benign incidental simple appearing renal cystic lesion(s) based on imaging    criteria.      CT-RENAL COLIC EVALUATION(A/P W/O)   Final Result         1.  Ill-defined hyperdensity dependently within the bladder most likely representing hemorrhage given the history of hematuria. Follow-up is recommended.   2.  At least moderate bilateral hydroureteronephrosis.   3.  No urolithiasis.   4.  Indeterminate 17 mm exophytic lesion in the left kidney. Further evaluation with renal ultrasound is recommended.   5.  Severe atherosclerosis.   6.  Trace pleural effusions.                    Assessment/Plan  * Hematuria- (present on admission)  Assessment & Plan  -Profound hematuria on admission, resolved on CBI   Discussed with urology 1/22; dc CBI and yu and monitor another day to ensure no more bleeding;  home tomorrow if stable;   has planned elective cystoscopy next month  -Hold home Plavix and aspirin    Sepsis (HCC)- (present on admission)  Assessment & Plan  -This is Sepsis Present on admission  SIRS criteria identified on my evaluation include: Tachypnea, with respirations greater than 20 per minute and Leukocyosis, with WBC greater than 12,000  Source is presumed UTI  Sepsis protocol initiated  Fluid resuscitation ordered per protocol  IV antibiotics as appropriate for source of sepsis  While organ dysfunction may be noted elsewhere in this problem list or in the chart, degree of organ dysfunction does not meet CMS criteria for severe sepsis  -No other complaints to suggest infection from a different organ system    Urine grew klesiella sensitive to rocephin and other abx's; resume rocephin and change to po abx at discharge..  Clinically improved.    Leg edema, right- (present on admission)  Assessment & Plan  More than LLE; unclear if acute vs chronic;  Check US, negative for DVT    Hydronephrosis- (present on admission)  Assessment & Plan  -Noted on CT scan, will be further evaluated with renal ultrasound  Mod bilat hydro per US, apparently chronic per wife's report.  Urology consult.    Acute on chronic renal failure (HCC)- (present on admission)  Assessment & Plan  -Due to hydronephrosis and dehydration  -Start IV fluids  -Repeat BMP in the morning  Avoid nephrotoxic meds    (HCC) Paroxysmal atrial fibrillation- (present on admission)  Assessment & Plan  -Currently sinus on home metoprolol, continue  -Patient is on Plavix and aspirin at home, hold due to hematuria    Altered mobility due to old stroke- (present on admission)  Assessment & Plan  Old stroke with L HP, impaired mobility; can walk short distance with cane/walker and assist but mostly WC bound otherwise.  Good support from wife at home.  Resume ASA/Plavix later when hematuria resolves.    Normocytic anemia- (present on admission)  Assessment &  Plan  -With significant bleeding from hematuria  Lower Hgb from hematuria; f/u cbc until stability    Renal mass- (present on admission)  Assessment & Plan  -Noted on CT scan, renal ultrasound revealed renal cyst; no further work up indicated.        (HCC) Moderate episode of recurrent major depressive disorder- (present on admission)  Assessment & Plan  -Continue Prozac    Type 2 diabetes mellitus, with long-term current use of insulin ([facility])- (present on admission)  Assessment & Plan  -Controlled glucose at this time, A1C 6.2;  start insulin sliding scale  -Hold home Lantus       VTE prophylaxis: scd

## 2020-01-24 VITALS
OXYGEN SATURATION: 93 % | DIASTOLIC BLOOD PRESSURE: 85 MMHG | RESPIRATION RATE: 18 BRPM | WEIGHT: 205 LBS | HEART RATE: 72 BPM | TEMPERATURE: 98 F | SYSTOLIC BLOOD PRESSURE: 169 MMHG | HEIGHT: 72 IN | BODY MASS INDEX: 27.77 KG/M2

## 2020-01-24 PROBLEM — A41.9 SEPSIS (HCC): Status: RESOLVED | Noted: 2020-01-21 | Resolved: 2020-01-24

## 2020-01-24 PROBLEM — R31.9 HEMATURIA: Status: RESOLVED | Noted: 2020-01-21 | Resolved: 2020-01-24

## 2020-01-24 LAB
ANION GAP SERPL CALC-SCNC: 11 MMOL/L (ref 0–11.9)
BASOPHILS # BLD AUTO: 0.4 % (ref 0–1.8)
BASOPHILS # BLD: 0.04 K/UL (ref 0–0.12)
BUN SERPL-MCNC: 27 MG/DL (ref 8–22)
CALCIUM SERPL-MCNC: 8.2 MG/DL (ref 8.4–10.2)
CHLORIDE SERPL-SCNC: 104 MMOL/L (ref 96–112)
CO2 SERPL-SCNC: 24 MMOL/L (ref 20–33)
CREAT SERPL-MCNC: 1.5 MG/DL (ref 0.5–1.4)
EOSINOPHIL # BLD AUTO: 0.35 K/UL (ref 0–0.51)
EOSINOPHIL NFR BLD: 3.5 % (ref 0–6.9)
ERYTHROCYTE [DISTWIDTH] IN BLOOD BY AUTOMATED COUNT: 47.4 FL (ref 35.9–50)
GLUCOSE BLD-MCNC: 147 MG/DL (ref 65–99)
GLUCOSE BLD-MCNC: 97 MG/DL (ref 65–99)
GLUCOSE SERPL-MCNC: 125 MG/DL (ref 65–99)
HCT VFR BLD AUTO: 28 % (ref 42–52)
HGB BLD-MCNC: 9.3 G/DL (ref 14–18)
IMM GRANULOCYTES # BLD AUTO: 0.06 K/UL (ref 0–0.11)
IMM GRANULOCYTES NFR BLD AUTO: 0.6 % (ref 0–0.9)
LYMPHOCYTES # BLD AUTO: 0.82 K/UL (ref 1–4.8)
LYMPHOCYTES NFR BLD: 8.1 % (ref 22–41)
MCH RBC QN AUTO: 28.7 PG (ref 27–33)
MCHC RBC AUTO-ENTMCNC: 33.2 G/DL (ref 33.7–35.3)
MCV RBC AUTO: 86.4 FL (ref 81.4–97.8)
MONOCYTES # BLD AUTO: 0.63 K/UL (ref 0–0.85)
MONOCYTES NFR BLD AUTO: 6.3 % (ref 0–13.4)
NEUTROPHILS # BLD AUTO: 8.17 K/UL (ref 1.82–7.42)
NEUTROPHILS NFR BLD: 81.1 % (ref 44–72)
NRBC # BLD AUTO: 0 K/UL
NRBC BLD-RTO: 0 /100 WBC
PLATELET # BLD AUTO: 251 K/UL (ref 164–446)
PMV BLD AUTO: 10 FL (ref 9–12.9)
POTASSIUM SERPL-SCNC: 3.4 MMOL/L (ref 3.6–5.5)
RBC # BLD AUTO: 3.24 M/UL (ref 4.7–6.1)
SODIUM SERPL-SCNC: 139 MMOL/L (ref 135–145)
WBC # BLD AUTO: 10.1 K/UL (ref 4.8–10.8)

## 2020-01-24 PROCEDURE — 36415 COLL VENOUS BLD VENIPUNCTURE: CPT

## 2020-01-24 PROCEDURE — 700111 HCHG RX REV CODE 636 W/ 250 OVERRIDE (IP): Performed by: INTERNAL MEDICINE

## 2020-01-24 PROCEDURE — 82962 GLUCOSE BLOOD TEST: CPT | Mod: 91

## 2020-01-24 PROCEDURE — 99239 HOSP IP/OBS DSCHRG MGMT >30: CPT | Performed by: INTERNAL MEDICINE

## 2020-01-24 PROCEDURE — 700102 HCHG RX REV CODE 250 W/ 637 OVERRIDE(OP): Performed by: INTERNAL MEDICINE

## 2020-01-24 PROCEDURE — A9270 NON-COVERED ITEM OR SERVICE: HCPCS | Performed by: UROLOGY

## 2020-01-24 PROCEDURE — 85025 COMPLETE CBC W/AUTO DIFF WBC: CPT

## 2020-01-24 PROCEDURE — A9270 NON-COVERED ITEM OR SERVICE: HCPCS | Performed by: INTERNAL MEDICINE

## 2020-01-24 PROCEDURE — 80048 BASIC METABOLIC PNL TOTAL CA: CPT

## 2020-01-24 PROCEDURE — 700105 HCHG RX REV CODE 258: Performed by: INTERNAL MEDICINE

## 2020-01-24 PROCEDURE — 700102 HCHG RX REV CODE 250 W/ 637 OVERRIDE(OP): Performed by: UROLOGY

## 2020-01-24 RX ORDER — FINASTERIDE 5 MG/1
5 TABLET, FILM COATED ORAL DAILY
Qty: 30 TAB | Refills: 0 | Status: SHIPPED | OUTPATIENT
Start: 2020-01-25 | End: 2022-05-18

## 2020-01-24 RX ORDER — SULFAMETHOXAZOLE AND TRIMETHOPRIM 800; 160 MG/1; MG/1
1 TABLET ORAL DAILY
Qty: 3 TAB | Refills: 0 | Status: SHIPPED | OUTPATIENT
Start: 2020-01-25 | End: 2020-01-26

## 2020-01-24 RX ADMIN — CEFTRIAXONE SODIUM 1 G: 1 INJECTION, POWDER, FOR SOLUTION INTRAMUSCULAR; INTRAVENOUS at 05:51

## 2020-01-24 RX ADMIN — FLUOXETINE 40 MG: 20 CAPSULE ORAL at 05:52

## 2020-01-24 RX ADMIN — Medication 800 MG: at 10:50

## 2020-01-24 RX ADMIN — FENOFIBRATE 134 MG: 134 CAPSULE ORAL at 05:52

## 2020-01-24 RX ADMIN — FINASTERIDE 5 MG: 5 TABLET, FILM COATED ORAL at 05:52

## 2020-01-24 NOTE — CARE PLAN
Problem: Communication  Goal: The ability to communicate needs accurately and effectively will improve  Outcome: PROGRESSING AS EXPECTED  Note:   Patient uses call bell appropriately and able to communicate needs to staff.      Problem: Safety  Goal: Will remain free from injury  Outcome: PROGRESSING AS EXPECTED  Note:   Patient does not try and ambulate without staff and is educated on fall risk. Patient with non skid socks in place.

## 2020-01-24 NOTE — PROGRESS NOTES
Pt discharged home, by car, with wife, with staff escort. Discharge instructions gone over with pt and pt's wife. All questions and concerns addressed. Discharge instructions copy with pt. IV removed. Pt has no c/o pain. Personal belongings accounted for and with pt and pt's wife. Pt and pt's wife educated on Michaels and how to care for it. Pt given 1 large and 1 medium leg bag. Pt and wife educated on leg bags. Pt's ID band removed and put in shred box.

## 2020-01-24 NOTE — PROGRESS NOTES
Assumed pt care. Pt in bed, awake, eating breakfast, in no apparent distress. Safety precautions in place. Call light and personal belongings in place and within reach. Denies pain. Updated on POC. Will continue to monitor. Pt being visited by urologist PA.

## 2020-01-24 NOTE — PROGRESS NOTES
Bladder scan results this morning are 721- dr. Servin paged and order for x1 straight cath received.

## 2020-01-24 NOTE — DISCHARGE SUMMARY
Discharge Summary    CHIEF COMPLAINT ON ADMISSION  Chief Complaint   Patient presents with   • Blood in Urine     dark red with clots x 2 days   • Weakness   • N/V       Reason for Admission  Nausea, Vomiting, Blood in Urine     Admission Date  1/21/2020    CODE STATUS  Full Code    HPI & HOSPITAL COURSE  This is a 74 y.o. male here with hematuria.  Patient states his symptoms started yesterday, was not severe at that time.  He did go see his urologist, had a scheduled appointment, they did try to obtain a urinalysis to evaluate for urinary tract infection however it was too bloody.  Patient has had a recurrent UTI.  They did order a CT scan but it was not obtained yet.  Today he began having malaise, fatigue, nausea, vomiting and chills.  He also began noticing clots in his urine.  He did complain of urgency and frequency but no dysuria or abdominal pain.    Patient was started on CBI and urine culture sent, growing Klebsiella pneumonia and patient was treated with Rocephin, then changed to po bactrim at discharge.  Also has CATA on CKD treated with IVF with return to baseline renal function. His hematuria resolved and CBI was stopped and yu dc'ed.  Patient developed acute urine retention and required yu placement and will go home with yu.  Urology consulted and rec. Outpatient f/u with planned cystoscopy.  Finasteride was added for BPH.  Patient had blood loss anemia which has stabilized at time of discharge, Hgb at 9.3.  He did not require transfusions here.   His ASA/Plavix were stopped due to active bleeding and can resume next week if no recurrent bleed.    CT showed:  1.  Ill-defined hyperdensity dependently within the bladder most likely representing hemorrhage given the history of hematuria. Follow-up is recommended.  2.  At least moderate bilateral hydroureteronephrosis.  3.  No urolithiasis.  4.  Indeterminate 17 mm exophytic lesion in the left kidney. Further evaluation with renal ultrasound is  recommended.  5.  Severe atherosclerosis.  6.  Trace pleural effusions.      Renal US showed:  1.  Moderate bilateral hydronephrosis.  2.  1.7 cm exophytic simple appearing left renal cyst. No follow up imaging is recommended per consensus guidelines of the 2019 ACR Incidental Findings Committee for probably benign incidental simple appearing renal cystic lesion(s) based on imaging   Criteria.    US RLE to assess swelling showed no DVT.    Patient developed acute mild diarrhea on abx here and has a h/o diarrhea induced by most abx use, no C diff.  He will start on probiotic and his wife will monitor his diarrhea at home.  I advised her to seek medical help if diarrhea worsens or does not resolve after stopping abx.    Therefore, he is discharged in fair and stable condition to home with close outpatient follow-up.    The patient met 2-midnight criteria for an inpatient stay at the time of discharge.    Discharge Date  1/24/20    FOLLOW UP ITEMS POST DISCHARGE  Urology/pcp next week    DISCHARGE DIAGNOSES  Principal Problem (Resolved):    Hematuria POA: Yes  Active Problems:    (HCC) Paroxysmal atrial fibrillation POA: Yes      Overview: Noted during admission at [facility] in 5/2017 for sepsis. No       known recurrence not on anticoagulation.     Acute on chronic renal failure (HCC) POA: Yes    Hydronephrosis POA: Yes    Leg edema, right POA: Yes    Renal mass POA: Yes    Normocytic anemia POA: Yes    Altered mobility due to old stroke POA: Yes    Type 2 diabetes mellitus, with long-term current use of insulin (HCC) POA: Yes    (HCC) Moderate episode of recurrent major depressive disorder POA: Yes  Resolved Problems:    Sepsis (HCC) POA: Yes      FOLLOW UP  Future Appointments   Date Time Provider Department Center   2/4/2020 11:00 AM NANCY [facility]ward   2/6/2020 11:30 AM Laly Jarvis M.D. [facility]   3/17/2020  1:20 PM Fran Rasheed M.D. [facility]   4/9/2020 10:20 AM  ALEN Gann [facility]   4/15/2020 11:00 AM Osvaldo Tucker M.D. CB None     urology next week    In 1 week        MEDICATIONS ON DISCHARGE     Medication List      START taking these medications      Instructions   finasteride 5 MG Tabs  Start taking on:  January 25, 2020  Commonly known as:  PROSCAR   Take 1 Tab by mouth every day.  Dose:  5 mg     sulfamethoxazole-trimethoprim 800-160 MG tablet  Start taking on:  January 25, 2020  Commonly known as:  BACTRIM DS   Take 1 Tab by mouth every day.  Dose:  1 Tab        CONTINUE taking these medications      Instructions   acetaminophen 500 MG Tabs  Commonly known as:  TYLENOL   Take 1,000 mg by mouth every 6 hours as needed for Mild Pain or Moderate Pain.  Dose:  1,000 mg     alfuzosin 10 MG SR tablet  Commonly known as:  UROXATRAL   Take 10 mg by mouth every day.  Dose:  10 mg     allopurinol 100 MG Tabs  Commonly known as:  ZYLOPRIM   Take 100 mg by mouth every evening.  Dose:  100 mg     ascorbic acid 500 MG tablet  Commonly known as:  VITAMIN C   Take 500 mg by mouth every day.  Dose:  500 mg     atorvastatin 80 MG tablet  Commonly known as:  LIPITOR   Take 80 mg by mouth every evening.  Dose:  80 mg     D-MANNOSE PO   Take 1 tablet by mouth 2 Times a Day.  Dose:  1 tablet     DAILY MULTIPLE VITAMINS PO   Take 1 Tab by mouth every day.  Dose:  1 Tab     Dulaglutide 1.5 MG/0.5ML Sopn  Commonly known as:  TRULICITY   Inject 1.5 mg as instructed every 7 days. On Tue  Dose:  1.5 mg     fenofibrate 145 MG Tabs  Commonly known as:  TRICOR   Take 145 mg by mouth every evening.  Dose:  145 mg     HUMALOG KWIKPEN 100 UNIT/ML injection PEN  Generic drug:  insulin lispro (Human)   Doctor's comments:  **Patient requests 90 days supply**  Inject 5-9 Units as instructed 3 times a day before meals. Sliding Scale  Dose:  5-9 Units     losartan 50 MG Tabs  Commonly known as:  COZAAR   Take 1 Tab by mouth every day.  Dose:  50 mg     methenamine hip 1 GM  Tabs  Commonly known as:  HIPPREX   Take 1 g by mouth 2 times a day.  Dose:  1 g     metoprolol 200 MG XL tablet  Commonly known as:  TOPROL-XL   Take 1 Tab by mouth every evening.  Dose:  200 mg     potassium chloride 8 MEQ tablet  Commonly known as:  KLOR-CON   Take 1 Tab by mouth every day.  Dose:  8 mEq     PROZAC 40 MG capsule  Generic drug:  fluoxetine   Take 40 mg by mouth every day.  Dose:  40 mg     saccharomyces boulardii 250 MG Caps  Commonly known as:  FLORASTOR   Take 250 mg by mouth 2 Times a Day.  Dose:  250 mg     TOUJEO MAX SOLOSTAR 300 UNIT/ML Sopn  Generic drug:  Insulin Glargine   Inject 40 Units as instructed every bedtime.  Dose:  40 Units     Vitamin D 2000 units Caps   Take 4,000 Units by mouth 2 Times a Day.  Dose:  4,000 Units        STOP taking these medications    aspirin 81 MG tablet     clopidogrel 75 MG Tabs  Commonly known as:  PLAVIX     magnesium oxide 400 (241.3 Mg) MG Tabs tablet  Commonly known as:  MAG-OX            Allergies  Allergies   Allergen Reactions   • Diphenhydramine Hcl Anxiety     Pt is able to tolerate  Mg benadryl with less anxiety   • Lorazepam Unspecified     Disorientation   • Ciprofloxacin      Rash,stomach ache   • Spironolactone      Acute kidney injury       DIET  Orders Placed This Encounter   Procedures   • Diet Order Diabetic     Standing Status:   Standing     Number of Occurrences:   1     Order Specific Question:   Diet:     Answer:   Diabetic [3]       ACTIVITY  As tolerated.  Weight bearing as tolerated    CONSULTATIONS  urology    PROCEDURES  none    LABORATORY  Lab Results   Component Value Date    SODIUM 139 01/24/2020    POTASSIUM 3.4 (L) 01/24/2020    CHLORIDE 104 01/24/2020    CO2 24 01/24/2020    GLUCOSE 125 (H) 01/24/2020    BUN 27 (H) 01/24/2020    CREATININE 1.50 (H) 01/24/2020    CREATININE 1.4 05/28/2008        Lab Results   Component Value Date    WBC 10.1 01/24/2020    HEMOGLOBIN 9.3 (L) 01/24/2020    HEMATOCRIT 28.0 (L) 01/24/2020     PLATELETCT 251 01/24/2020        Total time of the discharge process exceeds 36 minutes.

## 2020-01-24 NOTE — PROGRESS NOTES
Patient noted to be leaking urine, bladder scanned x2 during shift so far and each time above 400. Most recent scan 521ml- hard to do PVR as patient is incontinent as baseline and does not know when he urinates. Patient stated did not need to urinate at this time and has no urge. Patient states this has been ongoing with no urge to pee at baseline. Paged dr. Servin to make aware.     Per dr. Servin straight cath x 1 for bladder scan over 500 and patient unable to urinate.

## 2020-01-24 NOTE — PROGRESS NOTES
Pt had incontinent void in bed. Post void residual 250ml. Will continue to monitor and check post void residuals.

## 2020-01-24 NOTE — PROGRESS NOTES
[facility] [place]    Progress Note    Service: [facility] [place]  Patient's Name: Av Bob  MRN: [id number]  Admit Date:1/21/2020  Today's Date: 1/24/2020   Room #: 2202/01      Identification:  74 y.o. male with gross hematuria and retention      Overnight-Interval events:   - catheter removed, voiding by straight catheter Subjective/ROS:   Patient seen and examined. Reports urine remained clear to his knowledge. Catheter removed last night. He is not able to urinate on his own. Straight cath x2 reported, not well tolerated.     No CP/SOB/F/C     Physical Exam:  Current Vitals:   BP (!) 169/85   Pulse 72   Temp 36.7 °C (98 °F) (Oral)   Resp 18   Ht 1.829 m (6')   Wt 93 kg (205 lb)   SpO2 93%   BMI 27.80 kg/m²     01/22 1900 - 01/24 0659  In: 1853 [P.O.:1504; I.V.:249]  Out: 2125 [Urine:2125]   GEN : NAD, A&O X4   RES:  no acute respiratory distress  :   No yu   EXT:  No edema, SCD's in place     Labs:   Lab Results   Component Value Date/Time    WBC 10.1 01/24/2020 04:21 AM    HEMATOCRIT 28.0 (L) 01/24/2020 04:21 AM    SODIUM 139 01/24/2020 04:21 AM    POTASSIUM 3.4 (L) 01/24/2020 04:21 AM    CHLORIDE 104 01/24/2020 04:21 AM    CO2 24 01/24/2020 04:21 AM    BUN 27 (H) 01/24/2020 04:21 AM    CREATININE 1.50 (H) 01/24/2020 04:21 AM    CREATININE 1.4 05/28/2008 05:42 PM    CALCIUM 8.2 (L) 01/24/2020 04:21 AM    MAGNESIUM 1.5 04/30/2019 10:14 AM        Lab Results   Component Value Date/Time    GLUCOSE 125 (H) 01/24/2020 04:21 AM    GLUCOSE 71 01/23/2020 03:38 AM    GLUCOSE 127 (H) 01/22/2020 12:35 PM    GLUCOSE 105 (H) 01/21/2020 07:25 PM          Assessment/Plan  Av Bob is a 74 y.o. male with gross hematuria, retention.    - Recommend reinsertion of yu catheter and home with catheter given retention and incontinence  - home on abx, continue abx per medicine and culture directed  - home on finasteride  - continue to monitor labs  - will need outpatient cystoscopy, which [place] will arrange in the next week   - urology signing off and okay with discharge when cleared by medicine, call with questions    Bart Hull PA-C  [facility] [place]

## 2020-01-24 NOTE — CARE PLAN
Problem: Safety  Goal: Will remain free from injury  Outcome: PROGRESSING AS EXPECTED  Goal: Will remain free from falls  Outcome: PROGRESSING AS EXPECTED  Intervention: Assess risk factors for falls  Flowsheets  Taken 1/23/2020 0936 by Megan Arechiga, R.N.  Pt Calls for Assistance: Yes  Taken 1/22/2020 1225 by Yady Mares R.N.  History of fall: 0  Intervention: Implement fall precautions  Flowsheets  Taken 1/23/2020 1614  Bed Alarm: Yes - Alarm On  Taken 1/24/2020 0809  Environmental Precautions: Treaded Slipper Socks on Patient;Personal Belongings, Wastebasket, Call Bell etc. in Easy Reach;Report Given to Other Health Care Providers Regarding Fall Risk;Bed in Low Position;Communication Sign for Patients & Families;Mobility Assessed & Appropriate Sign Placed     Problem: Venous Thromboembolism (VTW)/Deep Vein Thrombosis (DVT) Prevention:  Goal: Patient will participate in Venous Thrombosis (VTE)/Deep Vein Thrombosis (DVT)Prevention Measures  Outcome: PROGRESSING AS EXPECTED  Flowsheets (Taken 1/24/2020 0809)  Risk Assessment Score: 1  VTE RISK: Moderate  Pharmacologic Prophylaxis Used: Contraindicated per MD     Problem: Bowel/Gastric:  Goal: Normal bowel function is maintained or improved  Outcome: PROGRESSING AS EXPECTED  Flowsheets (Taken 1/24/2020 0900 by Courtney Vidal R.N.)  Last BM: 01/24/20  Number of Times Stooled: 1  Goal: Will not experience complications related to bowel motility  Outcome: PROGRESSING AS EXPECTED     Problem: Pain Management  Goal: Pain level will decrease to patient's comfort goal  Outcome: PROGRESSING AS EXPECTED

## 2020-01-24 NOTE — DISCHARGE INSTRUCTIONS
Discharge Instructions    Discharged to home by car with relative. Discharged via wheelchair, hospital escort: Yes.  Special equipment needed: Not Applicable    Be sure to schedule a follow-up appointment with your primary care doctor or any specialists as instructed.     Discharge Plan:   Influenza Vaccine Indication: Not indicated: Previously immunized this influenza season and > 8 years of age    I understand that a diet low in cholesterol, fat, and sodium is recommended for good health. Unless I have been given specific instructions below for another diet, I accept this instruction as my diet prescription.   Other diet: Diabetic diet    Special Instructions: Sepsis, Adult  Sepsis is a serious infection of your blood or tissues that affects your whole body. The infection that causes sepsis may be bacterial, viral, fungal, or parasitic. Sepsis may be life threatening. Sepsis can cause your blood pressure to drop. This may result in shock. Shock causes your central nervous system and your organs to stop working correctly.  What increases the risk?  Sepsis can happen in anyone, but it is more likely to happen in people who have weakened immune systems.  What are the signs or symptoms?  Symptoms of sepsis can include:  · Fever or low body temperature (hypothermia).  · Rapid breathing (hyperventilation).  · Chills.  · Rapid heartbeat (tachycardia).  · Confusion or light-headedness.  · Trouble breathing.  · Urinating much less than usual.  · Cool, clammy skin or red, flushed skin.  · Other problems with the heart, kidneys, or brain.  How is this diagnosed?  Your health care provider will likely do tests to look for an infection, to see if the infection has spread to your blood, and to see how serious your condition is. Tests can include:  · Blood tests, including cultures of your blood.  · Cultures of other fluids from your body, such as:  ¨ Urine.  ¨ Pus from wounds.  ¨ Mucus coughed up from your lungs.  · Urine tests  other than cultures.  · X-ray exams or other imaging tests.  How is this treated?  Treatment will begin with elimination of the source of infection. If your sepsis is likely caused by a bacterial or fungal infection, you will be given antibiotic or antifungal medicines.  You may also receive:  · Oxygen.  · Fluids through an IV tube.  · Medicines to increase your blood pressure.  · A machine to clean your blood (dialysis) if your kidneys fail.  · A machine to help you breathe if your lungs fail.  Get help right away if:  You get an infection or develop any of the signs and symptoms of sepsis after surgery or a hospitalization.  This information is not intended to replace advice given to you by your health care provider. Make sure you discuss any questions you have with your health care provider.  Document Released: 09/15/2004 Document Revised: 05/25/2017 Document Reviewed: 08/25/2014  [employer] Interactive Patient Education © 2017 [employer]      · Is patient discharged on Warfarin / Coumadin?   No     Depression / Suicide Risk    As you are discharged from this [facility] facility, it is important to learn how to keep safe from harming yourself.    Recognize the warning signs:  · Abrupt changes in personality, positive or negative- including increase in energy   · Giving away possessions  · Change in eating patterns- significant weight changes-  positive or negative  · Change in sleeping patterns- unable to sleep or sleeping all the time   · Unwillingness or inability to communicate  · Depression  · Unusual sadness, discouragement and loneliness  · Talk of wanting to die  · Neglect of personal appearance   · Rebelliousness- reckless behavior  · Withdrawal from people/activities they love  · Confusion- inability to concentrate     If you or a loved one observes any of these behaviors or has concerns about self-harm, here's what you can do:  · Talk about it- your feelings and reasons for harming  yourself  · Remove any means that you might use to hurt yourself (examples: pills, rope, extension cords, firearm)  · Get professional help from the community (Mental Health, Substance Abuse, psychological counseling)  · Do not be alone:Call your Safe Contact- someone whom you trust who will be there for you.  · Call your local CRISIS HOTLINE 278-0815 or 432-035-1561  · Call your local Children's Mobile Crisis Response Team [place] (309) 412-4531 or [employer]  · Call the toll free National Suicide Prevention Hotlines   · National Suicide Prevention Lifeline 973-046-HDLD (4392)  · National Hope Line Network 800-SUICIDE (629-5866)    Michaels Catheter Care, Adult  A Michaels catheter is a soft, flexible tube that is placed into the bladder to drain urine. A Michaels catheter may be inserted if:  · You leak urine or are not able to control when you urinate (urinary incontinence).  · You are not able to urinate when you need to (urinary retention).  · You had prostate surgery or surgery on the genitals.  · You have certain medical conditions, such as multiple sclerosis, dementia, or a spinal cord injury.  If you are going home with a Michaels catheter in place, follow the instructions below.  TAKING CARE OF THE CATHETER  1. Wash your hands with soap and water.  2. Using mild soap and warm water on a clean washcloth:  ¨ Clean the area on your body closest to the catheter insertion site using a circular motion, moving away from the catheter. Never wipe toward the catheter because this could sweep bacteria up into the urethra and cause infection.  ¨ Remove all traces of soap. Pat the area dry with a clean towel. For males, reposition the foreskin.  3. Attach the catheter to your leg so there is no tension on the catheter. Use adhesive tape or a leg strap. If you are using adhesive tape, remove any sticky residue left behind by the previous tape you used.  4. Keep the drainage bag below the level of the bladder, but  keep it off the floor.  5. Check throughout the day to be sure the catheter is working and urine is draining freely. Make sure the tubing does not become kinked.  6. Do not pull on the catheter or try to remove it. Pulling could damage internal tissues.  TAKING CARE OF THE DRAINAGE BAGS  You will be given two drainage bags to take home. One is a large overnight drainage bag, and the other is a smaller leg bag that fits underneath clothing. You may wear the overnight bag at any time, but you should never wear the smaller leg bag at night. Follow the instructions below for how to empty, change, and clean your drainage bags.  Emptying the Drainage Bag  You must empty your drainage bag when it is  -½ full or at least 2-3 times a day.  2. Wash your hands with soap and water.  3. Keep the drainage bag below your hips, below the level of your bladder. This stops urine from going back into the tubing and into your bladder.  4. Hold the dirty bag over the toilet or a clean container.  5. Open the pour spout at the bottom of the bag and empty the urine into the toilet or container. Do not let the pour spout touch the toilet, container, or any other surface. Doing so can place bacteria on the bag, which can cause an infection.  6. Clean the pour spout with a gauze pad or cotton ball that has rubbing alcohol on it.  7. Close the pour spout.  8. Attach the bag to your leg with adhesive tape or a leg strap.  9. Wash your hands well.  Changing the Drainage Bag  Change your drainage bag once a month or sooner if it starts to smell bad or look dirty. Below are steps to follow when changing the drainage bag.  2. Wash your hands with soap and water.  3. Pinch off the rubber catheter so that urine does not spill out.  4. Disconnect the catheter tube from the drainage tube at the connection valve. Do not let the tubes touch any surface.  5. Clean the end of the catheter tube with an alcohol wipe. Use a different alcohol wipe to clean  the end of the drainage tube.  6. Connect the catheter tube to the drainage tube of the clean drainage bag.  7. Attach the new bag to the leg with adhesive tape or a leg strap. Avoid attaching the new bag too tightly.  8. Wash your hands well.  Cleaning the Drainage Bag  1. Wash your hands with soap and water.  2. Wash the bag in warm, soapy water.  3. Rinse the bag thoroughly with warm water.  4. Fill the bag with a solution of white vinegar and water (1 cup vinegar to 1 qt warm water [.2 L vinegar to 1 L warm water]). Close the bag and soak it for 30 minutes in the solution.  5. Rinse the bag with warm water.  6. Hang the bag to dry with the pour spout open and hanging downward.  7. Store the clean bag (once it is dry) in a clean plastic bag.  8. Wash your hands well.  PREVENTING INFECTION  · Wash your hands before and after handling your catheter.  · Take showers daily and wash the area where the catheter enters your body. Do not take baths. Replace wet leg straps with dry ones, if this applies.  · Do not use powders, sprays, or lotions on the genital area. Only use creams, lotions, or ointments as directed by your caregiver.  · For females, wipe from front to back after each bowel movement.  · Drink enough fluids to keep your urine clear or pale yellow unless you have a fluid restriction.  · Do not let the drainage bag or tubing touch or lie on the floor.  · Wear cotton underwear to absorb moisture and to keep your .  SEEK MEDICAL CARE IF:   · Your urine is cloudy or smells unusually bad.  · Your catheter becomes clogged.  · You are not draining urine into the bag or your bladder feels full.  · Your catheter starts to leak.  SEEK IMMEDIATE MEDICAL CARE IF:   · You have pain, swelling, redness, or pus where the catheter enters the body.  · You have pain in the abdomen, legs, lower back, or bladder.  · You have a fever.  · You see blood fill the catheter, or your urine is pink or red.  · You have nausea,  vomiting, or chills.  · Your catheter gets pulled out.  MAKE SURE YOU:   · Understand these instructions.  · Will watch your condition.  · Will get help right away if you are not doing well or get worse.     This information is not intended to replace advice given to you by your health care provider. Make sure you discuss any questions you have with your health care provider.     Document Released: 12/18/2006 Document Revised: 05/04/2015 Document Reviewed: 12/09/2013  [employer] Interactive Patient Education ©2016 [employer]

## 2020-01-24 NOTE — CARE PLAN
Problem: Safety  Goal: Will remain free from injury  Outcome: PROGRESSING AS EXPECTED  Goal: Will remain free from falls  Outcome: PROGRESSING AS EXPECTED  Intervention: Assess risk factors for falls  Flowsheets  Taken 1/23/2020 0936 by Megan Arechiga RKimberlyN.  Pt Calls for Assistance: Yes  Taken 1/22/2020 1225 by Yady Mares R.N.  History of fall: 0  Intervention: Implement fall precautions  Flowsheets  Taken 1/23/2020 1614  Bed Alarm: Yes - Alarm On  Taken 1/23/2020 0936  Environmental Precautions: Treaded Slipper Socks on Patient;Personal Belongings, Wastebasket, Call Bell etc. in Easy Reach;Transferred to Stronger Side;Report Given to Other Health Care Providers Regarding Fall Risk;Bed in Low Position;Communication Sign for Patients & Families;Mobility Assessed & Appropriate Sign Placed     Problem: Venous Thromboembolism (VTW)/Deep Vein Thrombosis (DVT) Prevention:  Goal: Patient will participate in Venous Thrombosis (VTE)/Deep Vein Thrombosis (DVT)Prevention Measures  Outcome: PROGRESSING AS EXPECTED  Flowsheets (Taken 1/23/2020 0936)  Risk Assessment Score: 1  VTE RISK: Moderate     Problem: Bowel/Gastric:  Goal: Normal bowel function is maintained or improved  Outcome: PROGRESSING AS EXPECTED  Note:   Pt has had 2 BMs this shift.  Goal: Will not experience complications related to bowel motility  Outcome: PROGRESSING AS EXPECTED     Problem: Pain Management  Goal: Pain level will decrease to patient's comfort goal  Outcome: PROGRESSING AS EXPECTED  Note:   Pt has had no c/o pain other than intermittent bladder spasms following Michaels removal.

## 2020-01-26 ENCOUNTER — APPOINTMENT (OUTPATIENT)
Dept: RADIOLOGY | Facility: MEDICAL CENTER | Age: 75
End: 2020-01-26
Attending: EMERGENCY MEDICINE
Payer: MEDICARE

## 2020-01-26 ENCOUNTER — HOSPITAL ENCOUNTER (EMERGENCY)
Facility: MEDICAL CENTER | Age: 75
End: 2020-01-26
Attending: EMERGENCY MEDICINE
Payer: MEDICARE

## 2020-01-26 VITALS
SYSTOLIC BLOOD PRESSURE: 118 MMHG | WEIGHT: 197.42 LBS | RESPIRATION RATE: 20 BRPM | DIASTOLIC BLOOD PRESSURE: 75 MMHG | HEART RATE: 81 BPM | BODY MASS INDEX: 26.74 KG/M2 | TEMPERATURE: 99.5 F | OXYGEN SATURATION: 93 % | HEIGHT: 72 IN

## 2020-01-26 DIAGNOSIS — N18.30 STAGE 3 CHRONIC KIDNEY DISEASE: ICD-10-CM

## 2020-01-26 DIAGNOSIS — R79.89 ELEVATED TROPONIN: ICD-10-CM

## 2020-01-26 DIAGNOSIS — J10.1 INFLUENZA A: ICD-10-CM

## 2020-01-26 LAB
ALBUMIN SERPL BCP-MCNC: 3.2 G/DL (ref 3.2–4.9)
ALBUMIN/GLOB SERPL: 1.5 G/DL
ALP SERPL-CCNC: 91 U/L (ref 30–99)
ALT SERPL-CCNC: 33 U/L (ref 2–50)
ANION GAP SERPL CALC-SCNC: 14 MMOL/L (ref 0–11.9)
AST SERPL-CCNC: 59 U/L (ref 12–45)
BACTERIA BLD CULT: NORMAL
BACTERIA BLD CULT: NORMAL
BASOPHILS # BLD AUTO: 0.2 % (ref 0–1.8)
BASOPHILS # BLD: 0.02 K/UL (ref 0–0.12)
BILIRUB SERPL-MCNC: 0.4 MG/DL (ref 0.1–1.5)
BUN SERPL-MCNC: 18 MG/DL (ref 8–22)
CALCIUM SERPL-MCNC: 8.2 MG/DL (ref 8.4–10.2)
CHLORIDE SERPL-SCNC: 104 MMOL/L (ref 96–112)
CO2 SERPL-SCNC: 21 MMOL/L (ref 20–33)
CREAT SERPL-MCNC: 1.42 MG/DL (ref 0.5–1.4)
EKG IMPRESSION: NORMAL
EOSINOPHIL # BLD AUTO: 0.09 K/UL (ref 0–0.51)
EOSINOPHIL NFR BLD: 1.1 % (ref 0–6.9)
ERYTHROCYTE [DISTWIDTH] IN BLOOD BY AUTOMATED COUNT: 45.3 FL (ref 35.9–50)
FLUAV RNA SPEC QL NAA+PROBE: POSITIVE
FLUBV RNA SPEC QL NAA+PROBE: NEGATIVE
GLOBULIN SER CALC-MCNC: 2.2 G/DL (ref 1.9–3.5)
GLUCOSE SERPL-MCNC: 116 MG/DL (ref 65–99)
HCT VFR BLD AUTO: 30.7 % (ref 42–52)
HGB BLD-MCNC: 10.3 G/DL (ref 14–18)
IMM GRANULOCYTES # BLD AUTO: 0.09 K/UL (ref 0–0.11)
IMM GRANULOCYTES NFR BLD AUTO: 1.1 % (ref 0–0.9)
LACTATE BLD-SCNC: 2 MMOL/L (ref 0.5–2)
LYMPHOCYTES # BLD AUTO: 0.14 K/UL (ref 1–4.8)
LYMPHOCYTES NFR BLD: 1.6 % (ref 22–41)
MCH RBC QN AUTO: 28.9 PG (ref 27–33)
MCHC RBC AUTO-ENTMCNC: 33.6 G/DL (ref 33.7–35.3)
MCV RBC AUTO: 86 FL (ref 81.4–97.8)
MONOCYTES # BLD AUTO: 0.37 K/UL (ref 0–0.85)
MONOCYTES NFR BLD AUTO: 4.3 % (ref 0–13.4)
NEUTROPHILS # BLD AUTO: 7.84 K/UL (ref 1.82–7.42)
NEUTROPHILS NFR BLD: 91.7 % (ref 44–72)
NRBC # BLD AUTO: 0 K/UL
NRBC BLD-RTO: 0 /100 WBC
PLATELET # BLD AUTO: 274 K/UL (ref 164–446)
PMV BLD AUTO: 9.5 FL (ref 9–12.9)
POTASSIUM SERPL-SCNC: 3.8 MMOL/L (ref 3.6–5.5)
PROT SERPL-MCNC: 5.4 G/DL (ref 6–8.2)
RBC # BLD AUTO: 3.57 M/UL (ref 4.7–6.1)
SIGNIFICANT IND 70042: NORMAL
SIGNIFICANT IND 70042: NORMAL
SITE SITE: NORMAL
SITE SITE: NORMAL
SODIUM SERPL-SCNC: 139 MMOL/L (ref 135–145)
SOURCE SOURCE: NORMAL
SOURCE SOURCE: NORMAL
TROPONIN T SERPL-MCNC: 48 NG/L (ref 6–19)
WBC # BLD AUTO: 8.6 K/UL (ref 4.8–10.8)

## 2020-01-26 PROCEDURE — 71045 X-RAY EXAM CHEST 1 VIEW: CPT

## 2020-01-26 PROCEDURE — 80053 COMPREHEN METABOLIC PANEL: CPT

## 2020-01-26 PROCEDURE — A9270 NON-COVERED ITEM OR SERVICE: HCPCS | Performed by: EMERGENCY MEDICINE

## 2020-01-26 PROCEDURE — 85025 COMPLETE CBC W/AUTO DIFF WBC: CPT

## 2020-01-26 PROCEDURE — 84484 ASSAY OF TROPONIN QUANT: CPT

## 2020-01-26 PROCEDURE — 99285 EMERGENCY DEPT VISIT HI MDM: CPT | Performed by: INTERNAL MEDICINE

## 2020-01-26 PROCEDURE — 93005 ELECTROCARDIOGRAM TRACING: CPT | Performed by: EMERGENCY MEDICINE

## 2020-01-26 PROCEDURE — 36415 COLL VENOUS BLD VENIPUNCTURE: CPT

## 2020-01-26 PROCEDURE — 83605 ASSAY OF LACTIC ACID: CPT

## 2020-01-26 PROCEDURE — 94760 N-INVAS EAR/PLS OXIMETRY 1: CPT

## 2020-01-26 PROCEDURE — 87502 INFLUENZA DNA AMP PROBE: CPT

## 2020-01-26 PROCEDURE — 99285 EMERGENCY DEPT VISIT HI MDM: CPT

## 2020-01-26 PROCEDURE — 700102 HCHG RX REV CODE 250 W/ 637 OVERRIDE(OP): Performed by: EMERGENCY MEDICINE

## 2020-01-26 RX ORDER — OSELTAMIVIR PHOSPHATE 30 MG/1
30 CAPSULE ORAL ONCE
Status: COMPLETED | OUTPATIENT
Start: 2020-01-26 | End: 2020-01-26

## 2020-01-26 RX ORDER — CLOPIDOGREL BISULFATE 75 MG/1
75 TABLET ORAL DAILY
Status: SHIPPED | COMMUNITY
End: 2020-08-03

## 2020-01-26 RX ORDER — MAGNESIUM OXIDE 400 MG/1
400 TABLET ORAL 4 TIMES DAILY
Status: ON HOLD | COMMUNITY
End: 2022-05-31

## 2020-01-26 RX ORDER — SULFAMETHOXAZOLE AND TRIMETHOPRIM 800; 160 MG/1; MG/1
1 TABLET ORAL DAILY
Status: SHIPPED | COMMUNITY
Start: 2020-01-25 | End: 2020-02-04

## 2020-01-26 RX ORDER — OSELTAMIVIR PHOSPHATE 30 MG/1
30 CAPSULE ORAL 2 TIMES DAILY
Qty: 10 CAP | Refills: 0 | Status: SHIPPED | OUTPATIENT
Start: 2020-01-26 | End: 2020-01-31

## 2020-01-26 RX ADMIN — OSELTAMIVIR PHOSPHATE 30 MG: 30 CAPSULE ORAL at 17:56

## 2020-01-26 ASSESSMENT — ENCOUNTER SYMPTOMS
DIARRHEA: 0
DEPRESSION: 0
ABDOMINAL PAIN: 0
NAUSEA: 0
CHILLS: 0
FEVER: 0
STRIDOR: 0
DIZZINESS: 0
SPUTUM PRODUCTION: 0
SINUS PAIN: 0
SPEECH CHANGE: 0
DIAPHORESIS: 0
WHEEZING: 0
COUGH: 1
WEAKNESS: 1
SEIZURES: 0
NERVOUS/ANXIOUS: 0
HEADACHES: 0
FOCAL WEAKNESS: 0
ORTHOPNEA: 0
SHORTNESS OF BREATH: 0
BACK PAIN: 0
PALPITATIONS: 0
HEARTBURN: 0
MYALGIAS: 1

## 2020-01-26 ASSESSMENT — LIFESTYLE VARIABLES: DO YOU DRINK ALCOHOL: NO

## 2020-01-26 NOTE — ED TRIAGE NOTES
Chief Complaint   Patient presents with   • Weakness     Pt recenlty hospitalized for blood in urine, yu in place. Pt c/o diminished appitite and generalized weakness   • N/V     since this morning   • Cough     cough x 2 days     /91   Pulse 92   Temp 37.4 °C (99.3 °F) (Oral)   Resp 16   Ht 1.829 m (6')   Wt 90.7 kg (200 lb)   SpO2 94%   BMI 27.12 kg/m²     Pt assisted to triage in wheelchair. Pt alert and oriented, regular unlabored respirations.

## 2020-01-27 NOTE — CONSULTS
Hospital Medicine Consultation    Date of Service  1/26/2020    Referring Physician  Letha Hale M.D.    Consulting Physician  Vee Castillo M.D.    Reason for Consultation  Elevated troponin    History of Presenting Illness  74 y.o. male who presented 1/26/2020 with weakness for three days. The patient was discharged from the hospital two days ago after being treated for a urinary tract infection and hematuria, he has a yu catheter in place and is scheduled to see urology on 1/29. He has diarrhea since taking the antibiotics which he usually gets when he is on antibiotics. However since his discharge he has seemed to be getting weaker. He is normally wheelchair bound due to deficits from a prior stroke but has been more fatigued than usual. He does have an intermittent cough, no sputum, no fever, no shortness of breath, no chest pain and no rash.  Here in the emergency department he has a positive influenza A test and mildly elevated troponin of 48. He does have history of coronary artery disease with stents in place and is followed by Dr. Tucker from cardiology outpatient.    Review of Systems  Review of Systems   Constitutional: Positive for malaise/fatigue. Negative for chills, diaphoresis and fever.   HENT: Negative for congestion and sinus pain.    Respiratory: Positive for cough. Negative for sputum production, shortness of breath, wheezing and stridor.    Cardiovascular: Negative for chest pain, palpitations, orthopnea and leg swelling.   Gastrointestinal: Negative for abdominal pain, diarrhea, heartburn and nausea.   Genitourinary: Negative for frequency, hematuria and urgency.   Musculoskeletal: Positive for myalgias. Negative for back pain.        Buttock discomfort   Skin: Negative for itching and rash.   Neurological: Positive for weakness. Negative for dizziness, speech change, focal weakness, seizures and headaches.   Psychiatric/Behavioral: Negative for depression. The patient is not  nervous/anxious.    All other systems reviewed and are negative.      Past Medical History   has a past medical history of Acute respiratory failure with hypoxia ([facility]) (5/20/2017), CAD (coronary artery disease), Cancer ([facility]), Cataract, Cerebrovascular accident (CVA) ([facility]) (12/30/2016), CKD (chronic kidney disease) stage 3, GFR 30-59 ml/min ([facility]), Controlled gout (2014), Coronary atherosclerosis of native coronary artery, Depression, Diabetes ([facility]), Enterococcal septicemia ([facility]) (8/12/2017), Hypertension, Hypokalemia (2012), Hypomagnesemia (08/12/2017), Lymphoma ([facility]) (2/19/2017), Mixed hyperlipidemia, Nephrolithiasis (2006), NSTEMI (non-ST elevated myocardial infarction) ([facility]) (07/18/2017), Pain, Polyneuropathy in diabetes(357.2) (9/11/2013), Septic shock ([facility]) (5/20/2017), Skin ulcer of calf ([facility]) (2015), Stroke ([facility]) (2016), Urinary bladder disorder, Urinary incontinence, and Wound of left leg (2012). He also has no past medical history of Suicide attempt ([facility]).    Surgical History   has a past surgical history that includes lithotripsy; angioplasty (1997); wound closure general (4/3/2012); tonsillectomy and adenoidectomy; cataract extraction with iol; solo by laparoscopy (1998); cystoscopy stent placement (Left, 2/12/2018); ureteroscopy (Left, 2/12/2018); lasertripsy (Left, 2/12/2018); [facility] cardiac cath (1997); and [facility] cardiac cath (2009).    Family History  family history includes Cancer in his mother; Depression in his brother and mother; Diabetes in his father; Heart Disease in his brother and father; Kidney stones in his brother; Psychiatric Illness in his brother, mother, and another family member; Suicide Attempts in an other family member.    Social History   reports that he has never smoked. He has never used smokeless tobacco. He reports that he does not drink alcohol or use drugs.    Medications  Prior to Admission Medications   Prescriptions Last Dose Informant Patient Reported? Taking?    Cholecalciferol (VITAMIN D) 2000 units Cap 1/25/2020 at 2330 Significant Other Yes No   Sig: Take 4,000 Units by mouth 2 Times a Day.   D-MANNOSE PO 1/25/2020 at 2330 Significant Other Yes No   Sig: Take 1 tablet by mouth 2 Times a Day.   Dulaglutide (TRULICITY) 1.5 MG/0.5ML Solution Pen-injector 1/21/2020 at Unknown Significant Other No No   Sig: Inject 1.5 mg as instructed every 7 days. On Tue   Insulin Glargine (TOUJEO MAX SOLOSTAR) 300 UNIT/ML Solution Pen-injector 1/25/2020 at 2330 Significant Other Yes No   Sig: Inject 40 Units as instructed every bedtime.   Probiotic Product (PROBIOTIC DAILY PO) 1/25/2020 at 2330 Significant Other Yes Yes   Sig: Take 1 Cap by mouth 2 Times a Day.   acetaminophen (TYLENOL) 500 MG Tab 1/25/2020 at 2200 Significant Other Yes No   Sig: Take 1,000 mg by mouth every 6 hours as needed for Mild Pain or Moderate Pain.   alfuzosin (UROXATRAL) 10 MG SR tablet 1/26/2020 at 1400 Significant Other Yes No   Sig: Take 10 mg by mouth every day.   allopurinol (ZYLOPRIM) 100 MG Tab 1/25/2020 at 2330 Significant Other Yes No   Sig: Take 100 mg by mouth every evening.   ascorbic acid (VITAMIN C) 500 MG tablet 1/25/2020 at 1230 Significant Other Yes No   Sig: Take 500 mg by mouth every day.   aspirin EC (ECOTRIN) 81 MG Tablet Delayed Response 1/24/2020 at HOLD Significant Other Yes Yes   Sig: Take 81 mg by mouth every day.   atorvastatin (LIPITOR) 80 MG tablet 1/25/2020 at 2330 Significant Other Yes No   Sig: Take 80 mg by mouth every evening.   clopidogrel (PLAVIX) 75 MG Tab 1/24/2020 at HOLD Significant Other Yes Yes   Sig: Take 75 mg by mouth every day.   fenofibrate (TRICOR) 145 MG Tab 1/25/2020 at 2330 Significant Other Yes No   Sig: Take 145 mg by mouth every evening.   finasteride (PROSCAR) 5 MG Tab 1/26/2020 at 1400 Significant Other No No   Sig: Take 1 Tab by mouth every day.   fluoxetine (PROZAC) 40 MG capsule 1/26/2020 at 1400 Significant Other Yes No   Sig: Take 40 mg by mouth every  day.   insulin lispro, Human, (HUMALOG KWIKPEN) 100 UNIT/ML Solution Pen-injector injection 1/25/2020 at 1600 Significant Other Yes No   Sig: Inject 5-9 Units as instructed 3 times a day before meals. Sliding Scale   losartan (COZAAR) 50 MG Tab 1/26/2020 at 1400 Significant Other No No   Sig: Take 1 Tab by mouth every day.   magnesium oxide (MAG-OX) 400 MG Tab 1/24/2020 at HOLD Significant Other Yes Yes   Sig: Take 400 mg by mouth every day.   methenamine hip (HIPPREX) 1 GM Tab 1/26/2020 at 1400 Significant Other Yes No   Sig: Take 1 g by mouth 2 times a day.   metoprolol (TOPROL-XL) 200 MG XL tablet 1/25/2020 at 2330 Significant Other No No   Sig: Take 1 Tab by mouth every evening.   potassium chloride (KLOR-CON) 8 MEQ tablet 1/26/2020 at 1400 Significant Other No No   Sig: Take 1 Tab by mouth every day.   sulfamethoxazole-trimethoprim (BACTRIM DS) 800-160 MG tablet 1/26/2020 at 1400 Significant Other Yes Yes   Sig: Take 1 Tab by mouth every day. Pt started on 1/25/2020 for 3 days      Facility-Administered Medications: None       Allergies  Allergies   Allergen Reactions   • Diphenhydramine Hcl Anxiety     Pt is able to tolerate  Mg benadryl with less anxiety   • Lorazepam Unspecified     Disorientation   • Ciprofloxacin      Rash,stomach ache   • Spironolactone      Acute kidney injury       Physical Exam  Temp:  [37.4 °C (99.3 °F)] 37.4 °C (99.3 °F)  Pulse:  [81-92] 81  Resp:  [15-26] 20  BP: (119-160)/(59-91) 131/63  SpO2:  [90 %-96 %] 93 %    Physical Exam  Vitals signs and nursing note reviewed.   Constitutional:       General: He is not in acute distress.     Appearance: He is ill-appearing. He is not diaphoretic.   HENT:      Nose: No congestion or rhinorrhea.      Mouth/Throat:      Pharynx: No oropharyngeal exudate or posterior oropharyngeal erythema.   Eyes:      General: No scleral icterus.     Conjunctiva/sclera: Conjunctivae normal.      Pupils: Pupils are equal, round, and reactive to light.   Neck:       Musculoskeletal: Neck supple. No neck rigidity or muscular tenderness.   Cardiovascular:      Rate and Rhythm: Normal rate and regular rhythm.      Heart sounds: Normal heart sounds. No murmur.   Pulmonary:      Effort: No respiratory distress.      Breath sounds: Normal breath sounds. No stridor. No rhonchi.   Abdominal:      General: Bowel sounds are normal. There is no distension.      Tenderness: There is no tenderness.   Musculoskeletal:         General: No swelling.   Skin:     General: Skin is warm.      Coloration: Skin is pale. Skin is not jaundiced.      Findings: No bruising or erythema.   Neurological:      Mental Status: He is alert and oriented to person, place, and time.      Motor: Weakness present.      Coordination: Coordination abnormal.   Psychiatric:         Mood and Affect: Mood normal.         Behavior: Behavior normal.         Fluids      Laboratory  Recent Labs     01/24/20  0421 01/26/20  1639   WBC 10.1 8.6   RBC 3.24* 3.57*   HEMOGLOBIN 9.3* 10.3*   HEMATOCRIT 28.0* 30.7*   MCV 86.4 86.0   MCH 28.7 28.9   MCHC 33.2* 33.6*   RDW 47.4 45.3   PLATELETCT 251 274   MPV 10.0 9.5     Recent Labs     01/24/20  0421 01/26/20  1639   SODIUM 139 139   POTASSIUM 3.4* 3.8   CHLORIDE 104 104   CO2 24 21   GLUCOSE 125* 116*   BUN 27* 18   CREATININE 1.50* 1.42*   CALCIUM 8.2* 8.2*                     Imaging  DX-CHEST-PORTABLE (1 VIEW)   Final Result      Stable chest without acute/new abnormality.          Assessment/Plan  ([facility]) Paroxysmal atrial fibrillation- (present on admission)  Assessment & Plan  Continue home metoprolol, rate controlled, not on coagulation per cardiology    Coronary artery disease involving native coronary artery  Assessment & Plan  With stents in place, discussed troponin of 48 with the patient's cardiologist Dr. Tucker who is on call tonight and he reviewed the EKG with myself and agrees with discharge home and outpatient follow up as the patient has no chest pain      Influenza A  Assessment & Plan  Will prescribe tamiflu, patient is not hypoxic but has diffuse weakness due to influenza infection    Normocytic anemia- (present on admission)  Assessment & Plan  stable    Recurrent UTI- (present on admission)  Assessment & Plan  Antibiotics to complete tomorrow, patient has a yu catheter in place and urology follow up in 3 days for removal  Loose stool due to antibiotics is usual for him, not watery and only a couple stools a day    ([facility]) Diabetic nephropathy associated with type 2 diabetes mellitus- (present on admission)  Assessment & Plan  Continue home glargine and dulaglutide  Blood sugar in good range

## 2020-01-27 NOTE — ASSESSMENT & PLAN NOTE
Will prescribe tamiflu, patient is not hypoxic but has diffuse weakness due to influenza infection

## 2020-01-27 NOTE — ASSESSMENT & PLAN NOTE
With stents in place, discussed troponin of 48 with the patient's cardiologist Dr. Tucker who is on call tonight and he reviewed the EKG with myself and agrees with discharge home and outpatient follow up as the patient has no chest pain

## 2020-01-27 NOTE — ED NOTES
Patient discharged to home, verbalized understanding.    Assisted out to car with wheelchair.   Prescription sent to patient pharmacy.

## 2020-01-27 NOTE — ED PROVIDER NOTES
ED Provider Note  CHIEF COMPLAINT  Chief Complaint   Patient presents with   • Weakness     Pt recenlty hospitalized for blood in urine, yu in place. Pt c/o diminished appitite and generalized weakness   • N/V     since this morning   • Cough     cough x 2 days       HPI  Av Bob is a 74 y.o. male who presents with weakness and cough.  Patient was recently discharged from the hospital 2 days ago for a urinary tract infection.  Cultures grew out Klebsiella and he was treated with Rocephin and discharged with Bactrim.  He initially felt well when he was discharged home but then yesterday developed a cough that is gradually gotten worse.  The wife is tried Tylenol and cough drops without relief of his symptoms.  He denies any difficulty breathing.  Cough has had yellow phlegm.  He is also had a decreased appetite.  He has a Yu in place.  Wife reports he has had increased weakness and when he was discharged he is able to transfer to his wheelchair without difficulty and now he cannot get out of bed.  He has had nausea and vomiting.  No diarrhea.  Denies any chest pain.  Denies any skin rash.    REVIEW OF SYSTEMS  See HPI for further details. All other systems are negative.     PAST MEDICAL HISTORY  Past Medical History:   Diagnosis Date   • Enterococcal septicemia (Formerly [facility]) 8/12/2017   • Hypomagnesemia 08/12/2017    etiology uncertain   • NSTEMI (non-ST elevated myocardial infarction) (Formerly [facility]) 07/18/2017    complicating UTI with sepsis   • Acute respiratory failure with hypoxia ([facility]) 5/20/2017   • Septic shock (Formerly [facility]) 5/20/2017   • Lymphoma (Formerly [facility]) 2/19/2017    Large cell   • Cerebrovascular accident (CVA) (Formerly [facility]) 12/30/2016    Left arm weakness  etiology of stroke not established, lymphoma discovered on MRI evaluation of stroke, L hemiparesis much worse after acute infectious illness in mid 2017, but no specific diagnosed recurrent neurological etiology, all at [facility]   •  Stroke ([facility]) 2016    left sided weakness   • Skin ulcer of calf ([facility]) 2015    Right, Dr. Terry and wound care   • Controlled gout 2014   • Polyneuropathy in diabetes(357.2) 9/11/2013   • Hypokalemia 2012    controlled with combination of ACE inhibitor or ARB plus spironolactone   • Wound of left leg 2012    Requiring surgery and debridment, Dr. Moore   • Nephrolithiasis 2006    right kidney subsequent lithotripsy by Dr. Barry   • CAD (coronary artery disease)     GIOVANNA to RCA in '97, GIOVANNA X2 to LAD and GIOVANNA X2 to OM in '09   • Cancer ([facility])     2017; chemo lympoma   • Cataract    • CKD (chronic kidney disease) stage 3, GFR 30-59 ml/min ([facility])    • Coronary atherosclerosis of native coronary artery     S/P PTCA (percutaneous transluminal coronary angioplasty), RCA, 5/1997, patent on cath 7/10/2009 at the time of interventions on his left anterior descending and circumflex coronary arteries   • Depression    • Diabetes ([facility])    • Hypertension    • Mixed hyperlipidemia    • Pain    • Urinary bladder disorder    • Urinary incontinence        FAMILY HISTORY  [unfilled]    SOCIAL HISTORY  Social History     Socioeconomic History   • Marital status:      Spouse name: Not on file   • Number of children: Not on file   • Years of education: Not on file   • Highest education level: Not on file   Occupational History   • Not on file   Social Needs   • Financial resource strain: Not on file   • Food insecurity:     Worry: Not on file     Inability: Not on file   • Transportation needs:     Medical: Not on file     Non-medical: Not on file   Tobacco Use   • Smoking status: Never Smoker   • Smokeless tobacco: Never Used   Substance and Sexual Activity   • Alcohol use: No   • Drug use: No   • Sexual activity: Not Currently     Partners: Female     Comment: , one daughter, 2 grands   Lifestyle   • Physical activity:     Days per week: Not on file     Minutes per session: Not on file   • Stress: Not on file   Relationships    • Social connections:     Talks on phone: Not on file     Gets together: Not on file     Attends Jain service: Not on file     Active member of club or organization: Not on file     Attends meetings of clubs or organizations: Not on file     Relationship status: Not on file   • Intimate partner violence:     Fear of current or ex partner: Not on file     Emotionally abused: Not on file     Physically abused: Not on file     Forced sexual activity: Not on file   Other Topics Concern   • Not on file   Social History Narrative   • Not on file       SURGICAL HISTORY  Past Surgical History:   Procedure Laterality Date   • CYSTOSCOPY STENT PLACEMENT Left 2/12/2018    Procedure: CYSTOSCOPY  ;  Surgeon: Rey Barry M.D.;  Location: SURGERY [facility];  Service: Urology   • URETEROSCOPY Left 2/12/2018    Procedure: URETEROSCOPY- FLEXIBLE  ;  Surgeon: Rey Barry M.D.;  Location: SURGERY [facility];  Service: Urology   • LASERTRIPSY Left 2/12/2018    Procedure: LASERTRIPSY - LITHO  ;  Surgeon: Rey Barry M.D.;  Location: SURGERY [facility];  Service: Urology   • WOUND CLOSURE GENERAL  4/3/2012    Performed by GERHARD GILBERT at SURGERY SAME DAY [facility]   • ZZZ CARDIAC CATH  2009    Stents to LAD, Om   • DAGOBERTO BY LAPAROSCOPY  1998   • ANGIOPLASTY  1997    RCA followed by other stents as noted above.    • ZZZ CARDIAC CATH  1997    stent RCA   • CATARACT EXTRACTION WITH IOL      bilateral   • LITHOTRIPSY     • TONSILLECTOMY AND ADENOIDECTOMY         CURRENT MEDICATIONS  Home Medications     Reviewed by Norbert Cummings (Pharmacy Tech) on 01/26/20 at 1614  Med List Status: Complete   Medication Last Dose Status   acetaminophen (TYLENOL) 500 MG Tab 1/25/2020 Active   alfuzosin (UROXATRAL) 10 MG SR tablet 1/26/2020 Active   allopurinol (ZYLOPRIM) 100 MG Tab 1/25/2020 Active   ascorbic acid (VITAMIN C) 500 MG tablet 1/25/2020 Active   aspirin EC (ECOTRIN) 81 MG Tablet Delayed Response  1/24/2020 Active   atorvastatin (LIPITOR) 80 MG tablet 1/25/2020 Active   Cholecalciferol (VITAMIN D) 2000 units Cap 1/25/2020 Active   clopidogrel (PLAVIX) 75 MG Tab 1/24/2020 Active   D-MANNOSE PO 1/25/2020 Active   Dulaglutide (TRULICITY) 1.5 MG/0.5ML Solution Pen-injector 1/21/2020 Active   fenofibrate (TRICOR) 145 MG Tab 1/25/2020 Active   finasteride (PROSCAR) 5 MG Tab 1/26/2020 Active   fluoxetine (PROZAC) 40 MG capsule 1/26/2020 Active   Insulin Glargine (TOUJEO MAX SOLOSTAR) 300 UNIT/ML Solution Pen-injector 1/25/2020 Active   insulin lispro, Human, (HUMALOG KWIKPEN) 100 UNIT/ML Solution Pen-injector injection 1/25/2020 Active   losartan (COZAAR) 50 MG Tab 1/26/2020 Active   magnesium oxide (MAG-OX) 400 MG Tab 1/24/2020 Active   methenamine hip (HIPPREX) 1 GM Tab 1/26/2020 Active   metoprolol (TOPROL-XL) 200 MG XL tablet 1/25/2020 Active   potassium chloride (KLOR-CON) 8 MEQ tablet 1/26/2020 Active   Probiotic Product (PROBIOTIC DAILY PO) 1/25/2020 Active   sulfamethoxazole-trimethoprim (BACTRIM DS) 800-160 MG tablet 1/26/2020 Active                ALLERGIES  Allergies   Allergen Reactions   • Diphenhydramine Hcl Anxiety     Pt is able to tolerate  Mg benadryl with less anxiety   • Lorazepam Unspecified     Disorientation   • Ciprofloxacin      Rash,stomach ache   • Spironolactone      Acute kidney injury       PHYSICAL EXAM  VITAL SIGNS: /91   Pulse 92   Temp 37.4 °C (99.3 °F) (Oral)   Resp 16   Ht 1.829 m (6')   Wt 89.5 kg (197 lb 6.8 oz)   SpO2 94%   BMI 26.78 kg/m²  Room air O2: 94    Constitutional: Chronically ill appearing, moderate acute distress, Non-toxic appearance.   HENT: Normocephalic, Atraumatic, Bilateral external ears normal, Oropharynx dry, No oral exudates, Nose normal.   Eyes: PERRL, EOMI, Conjunctiva normal,   Neck: Normal range of motion, No tenderness, Supple  Cardiovascular: Normal heart rate, Normal rhythm  Thorax & Lungs: Normal breath sounds, No respiratory distress,  No wheezing, No chest tenderness.   Abdomen: Benign abdominal exam, no guarding no rebound,  no tenderness, no distention, Michaels in place with yellow urine  Skin: Warm, Dry, No erythema, No rash.   Back: No tenderness, No CVA tenderness.   Extremities: Intact distal pulses, No edema, No tenderness   Neurologic: Alert & oriented x 3, Normal motor function, Normal sensory function, No focal deficits noted.   Psychiatric: appropriate      Labs  Results for orders placed or performed during the hospital encounter of 01/26/20   CBC WITH DIFFERENTIAL   Result Value Ref Range    WBC 8.6 4.8 - 10.8 K/uL    RBC 3.57 (L) 4.70 - 6.10 M/uL    Hemoglobin 10.3 (L) 14.0 - 18.0 g/dL    Hematocrit 30.7 (L) 42.0 - 52.0 %    MCV 86.0 81.4 - 97.8 fL    MCH 28.9 27.0 - 33.0 pg    MCHC 33.6 (L) 33.7 - 35.3 g/dL    RDW 45.3 35.9 - 50.0 fL    Platelet Count 274 164 - 446 K/uL    MPV 9.5 9.0 - 12.9 fL    Neutrophils-Polys 91.70 (H) 44.00 - 72.00 %    Lymphocytes 1.60 (L) 22.00 - 41.00 %    Monocytes 4.30 0.00 - 13.40 %    Eosinophils 1.10 0.00 - 6.90 %    Basophils 0.20 0.00 - 1.80 %    Immature Granulocytes 1.10 (H) 0.00 - 0.90 %    Nucleated RBC 0.00 /100 WBC    Neutrophils (Absolute) 7.84 (H) 1.82 - 7.42 K/uL    Lymphs (Absolute) 0.14 (L) 1.00 - 4.80 K/uL    Monos (Absolute) 0.37 0.00 - 0.85 K/uL    Eos (Absolute) 0.09 0.00 - 0.51 K/uL    Baso (Absolute) 0.02 0.00 - 0.12 K/uL    Immature Granulocytes (abs) 0.09 0.00 - 0.11 K/uL    NRBC (Absolute) 0.00 K/uL   COMP METABOLIC PANEL   Result Value Ref Range    Sodium 139 135 - 145 mmol/L    Potassium 3.8 3.6 - 5.5 mmol/L    Chloride 104 96 - 112 mmol/L    Co2 21 20 - 33 mmol/L    Anion Gap 14.0 (H) 0.0 - 11.9    Glucose 116 (H) 65 - 99 mg/dL    Bun 18 8 - 22 mg/dL    Creatinine 1.42 (H) 0.50 - 1.40 mg/dL    Calcium 8.2 (L) 8.4 - 10.2 mg/dL    AST(SGOT) 59 (H) 12 - 45 U/L    ALT(SGPT) 33 2 - 50 U/L    Alkaline Phosphatase 91 30 - 99 U/L    Total Bilirubin 0.4 0.1 - 1.5 mg/dL    Albumin 3.2  3.2 - 4.9 g/dL    Total Protein 5.4 (L) 6.0 - 8.2 g/dL    Globulin 2.2 1.9 - 3.5 g/dL    A-G Ratio 1.5 g/dL   TROPONIN   Result Value Ref Range    Troponin T 48 (H) 6 - 19 ng/L   Influenza A/B By PCR (Adult - Flu Only)   Result Value Ref Range    Influenza virus A RNA POSITIVE (A) Negative    Influenza virus B, PCR Negative Negative   LACTIC ACID   Result Value Ref Range    Lactic Acid 2.0 0.5 - 2.0 mmol/L   ESTIMATED GFR   Result Value Ref Range    GFR If  59 (A) >60 mL/min/1.73 m 2    GFR If Non African American 49 (A) >60 mL/min/1.73 m 2   EKG   Result Value Ref Range    Report       [facility] Emergency Dept.    Test Date:  2020  Pt Name:    ELIGIO MADRIGAL              Department: [facility]  MRN:        [id number]                      [facility]  Gender:     Male                         Technician: HRS  :        [id number]                   Requested By:DESIREE ABAD  Order #:    [id number]                    Reading MD: Desiree Jarquin    Measurements  Intervals                                Axis  Rate:       82                           P:          -37  FL:         176                          QRS:        5  QRSD:       106                          T:          54  QT:         428  QTc:        500    Interpretive Statements  PACEMAKER SPIKES OR ARTIFACTS  SINUS RHYTHM  BORDERLINE INTRAVENTRICULAR CONDUCTION DELAY  LATE PRECORDIAL R/S TRANSITION  BORDERLINE PROLONGED QT INTERVAL  Compared to ECG 2018 13:22:08  No significant changes  Electronically Signed On 2020 19:31:53 PST by Desiree aJrquin         RADIOLOGY/PROCEDURES  DX-CHEST-PORTABLE (1 VIEW)   Final Result      Stable chest without acute/new abnormality.          COURSE & MEDICAL DECISION MAKING  Pertinent Labs & Imaging studies reviewed. (See chart for details)  Patient returns to the emergency department complaining of worsening weakness and a cough.  Recently discharged 2 days ago  for UTI and hematuria.  Currently taking Bactrim.  Patient is a decreased p.o. intake and looks dehydrated.  Blood pressure and heart rate are normal currently.  He has a low-grade temp.  Plan at this time is to recheck laboratory studies, reevaluate for sepsis, evaluate for possible pneumonia.    Laboratory studies have returned and show no acute EKG changes.  Patient's troponin was 48 and in the indeterminate range.  He has not been complaining of any chest pain or shortness of breath.  He does have a history significant for 5 cardiac stents.  Patient's influenza came back positive for A.  I think this is the likely etiology of the patient's cough and just generalized fatigue and not feeling well.  Patient has not had any episodes of hypoxia here.  However with his generalized fatigue as well his his cardiac history and his indeterminate troponin, I have consulted the hospitalist for admission to trend troponins and monitor him overnight.  Patient has been given Tamiflu.  Chest x-ray does not show any evidence of a pneumonia.  Patient is a normal white count without evidence of bandemia.  Lactic acid is normal.    Dr. Castillo saw and evaluated the patient.  She consulted Dr. Tucker who is the patient's cardiologist.  Dr. Tucker does not feel trending troponins is necessary at this time.  Therefore Dr. Castillo has elected to discharge the patient home.  Please see her note for further information.    FINAL IMPRESSION     1. Influenza A    2. Elevated troponin    3. Stage 3 chronic kidney disease (HCC)             Electronically signed by: Letha Hale M.D., 1/26/2020 4:16 PM

## 2020-01-27 NOTE — ED NOTES
Hannah from Lab called with critical result of Influenza A + at 1720. Critical lab result read back to Hannah.   Dr. Hale  and bedside RN notified of critical lab result at 1722.  Critical lab result read back by Dr. Hale.

## 2020-01-27 NOTE — ASSESSMENT & PLAN NOTE
Antibiotics to complete tomorrow, patient has a yu catheter in place and urology follow up in 3 days for removal  Loose stool due to antibiotics is usual for him, not watery and only a couple stools a day

## 2020-01-27 NOTE — ED NOTES
Med rec updated and complete  Allergies reviewed  Interviewed pt with wife at bedside with permission from pt.  Pts wife had a list of medications, went over list of medications and returned list of medications back to pts wife.

## 2020-01-27 NOTE — DISCHARGE INSTRUCTIONS
Discharge Instructions per Vee Castillo M.D.    Follow up with cardiology as needed    DIET: regular    ACTIVITY: as tolerated    DIAGNOSIS: influenza A    Return to ER if symptoms worsen

## 2020-02-03 ENCOUNTER — HOSPITAL ENCOUNTER (OUTPATIENT)
Dept: LAB | Facility: MEDICAL CENTER | Age: 75
End: 2020-02-03
Attending: INTERNAL MEDICINE
Payer: MEDICARE

## 2020-02-03 DIAGNOSIS — N18.30 CKD (CHRONIC KIDNEY DISEASE), STAGE III: ICD-10-CM

## 2020-02-03 DIAGNOSIS — I10 ESSENTIAL HYPERTENSION: ICD-10-CM

## 2020-02-03 DIAGNOSIS — E11.65 UNCONTROLLED TYPE 2 DIABETES MELLITUS WITH HYPERGLYCEMIA (HCC): ICD-10-CM

## 2020-02-03 LAB
ANION GAP SERPL CALC-SCNC: 6 MMOL/L (ref 0–11.9)
BUN SERPL-MCNC: 20 MG/DL (ref 8–22)
CALCIUM SERPL-MCNC: 9 MG/DL (ref 8.5–10.5)
CHLORIDE SERPL-SCNC: 103 MMOL/L (ref 96–112)
CO2 SERPL-SCNC: 28 MMOL/L (ref 20–33)
CREAT SERPL-MCNC: 1.43 MG/DL (ref 0.5–1.4)
CREAT UR-MCNC: 85.5 MG/DL
GLUCOSE SERPL-MCNC: 122 MG/DL (ref 65–99)
MICROALBUMIN UR-MCNC: 1.4 MG/DL
MICROALBUMIN/CREAT UR: 16 MG/G (ref 0–30)
POTASSIUM SERPL-SCNC: 4.3 MMOL/L (ref 3.6–5.5)
SODIUM SERPL-SCNC: 137 MMOL/L (ref 135–145)

## 2020-02-03 PROCEDURE — 80048 BASIC METABOLIC PNL TOTAL CA: CPT

## 2020-02-03 PROCEDURE — 36415 COLL VENOUS BLD VENIPUNCTURE: CPT

## 2020-02-03 PROCEDURE — 82043 UR ALBUMIN QUANTITATIVE: CPT

## 2020-02-03 PROCEDURE — 82570 ASSAY OF URINE CREATININE: CPT

## 2020-02-04 ENCOUNTER — OFFICE VISIT (OUTPATIENT)
Dept: MEDICAL GROUP | Facility: MEDICAL CENTER | Age: 75
End: 2020-02-04
Payer: MEDICARE

## 2020-02-04 VITALS
DIASTOLIC BLOOD PRESSURE: 64 MMHG | SYSTOLIC BLOOD PRESSURE: 118 MMHG | WEIGHT: 200 LBS | HEIGHT: 72 IN | OXYGEN SATURATION: 100 % | TEMPERATURE: 96.6 F | HEART RATE: 62 BPM | BODY MASS INDEX: 27.09 KG/M2

## 2020-02-04 DIAGNOSIS — F33.1 MODERATE EPISODE OF RECURRENT MAJOR DEPRESSIVE DISORDER (HCC): ICD-10-CM

## 2020-02-04 DIAGNOSIS — E78.5 HYPERLIPIDEMIA ASSOCIATED WITH TYPE 2 DIABETES MELLITUS (HCC): ICD-10-CM

## 2020-02-04 DIAGNOSIS — I48.91 LONE ATRIAL FIBRILLATION (HCC): ICD-10-CM

## 2020-02-04 DIAGNOSIS — N18.30 STAGE 3 CHRONIC KIDNEY DISEASE: ICD-10-CM

## 2020-02-04 DIAGNOSIS — Z79.899 POLYPHARMACY: ICD-10-CM

## 2020-02-04 DIAGNOSIS — Z79.4 TYPE 2 DIABETES MELLITUS WITH OTHER SPECIFIED COMPLICATION, WITH LONG-TERM CURRENT USE OF INSULIN (HCC): ICD-10-CM

## 2020-02-04 DIAGNOSIS — E11.69 HYPERLIPIDEMIA ASSOCIATED WITH TYPE 2 DIABETES MELLITUS (HCC): ICD-10-CM

## 2020-02-04 DIAGNOSIS — Z86.73 HISTORY OF CEREBROVASCULAR ACCIDENT (CVA) IN ADULTHOOD: ICD-10-CM

## 2020-02-04 DIAGNOSIS — I15.2 HYPERTENSION ASSOCIATED WITH DIABETES (HCC): ICD-10-CM

## 2020-02-04 DIAGNOSIS — E11.59 HYPERTENSION ASSOCIATED WITH DIABETES (HCC): ICD-10-CM

## 2020-02-04 DIAGNOSIS — E11.69 TYPE 2 DIABETES MELLITUS WITH OTHER SPECIFIED COMPLICATION, WITH LONG-TERM CURRENT USE OF INSULIN (HCC): ICD-10-CM

## 2020-02-04 PROBLEM — R32 URINARY INCONTINENCE: Status: ACTIVE | Noted: 2019-02-19

## 2020-02-04 PROBLEM — R33.9 INCOMPLETE EMPTYING OF BLADDER: Status: ACTIVE | Noted: 2019-11-19

## 2020-02-04 PROBLEM — R60.0 LEG EDEMA, RIGHT: Status: RESOLVED | Noted: 2020-01-22 | Resolved: 2020-02-04

## 2020-02-04 PROBLEM — D50.9 NORMOCYTIC HYPOCHROMIC ANEMIA: Status: RESOLVED | Noted: 2017-05-20 | Resolved: 2020-02-04

## 2020-02-04 PROBLEM — R31.0 FRANK HEMATURIA: Status: ACTIVE | Noted: 2020-01-29

## 2020-02-04 PROBLEM — R33.9 RETENTION OF URINE: Status: ACTIVE | Noted: 2020-01-29

## 2020-02-04 PROBLEM — N28.89 RENAL MASS: Status: RESOLVED | Noted: 2020-01-21 | Resolved: 2020-02-04

## 2020-02-04 PROBLEM — L97.912 CHRONIC ULCER OF RIGHT LOWER EXTREMITY WITH FAT LAYER EXPOSED (HCC): Status: RESOLVED | Noted: 2018-12-27 | Resolved: 2020-02-04

## 2020-02-04 PROBLEM — J10.1 INFLUENZA A: Status: RESOLVED | Noted: 2020-01-26 | Resolved: 2020-02-04

## 2020-02-04 PROBLEM — R31.0 FRANK HEMATURIA: Status: RESOLVED | Noted: 2020-01-29 | Resolved: 2020-02-04

## 2020-02-04 PROBLEM — N13.8 BENIGN PROSTATIC HYPERPLASIA WITH URINARY OBSTRUCTION: Status: ACTIVE | Noted: 2020-01-29

## 2020-02-04 PROBLEM — N31.9 NEUROGENIC BLADDER: Status: ACTIVE | Noted: 2019-11-19

## 2020-02-04 PROBLEM — N40.1 BENIGN PROSTATIC HYPERPLASIA WITH URINARY OBSTRUCTION: Status: ACTIVE | Noted: 2020-01-29

## 2020-02-04 PROBLEM — N18.9 ACUTE ON CHRONIC RENAL FAILURE (HCC): Status: RESOLVED | Noted: 2020-01-21 | Resolved: 2020-02-04

## 2020-02-04 PROBLEM — N17.9 ACUTE ON CHRONIC RENAL FAILURE (HCC): Status: RESOLVED | Noted: 2020-01-21 | Resolved: 2020-02-04

## 2020-02-04 PROBLEM — N28.1 RENAL CYST: Status: ACTIVE | Noted: 2020-01-29

## 2020-02-04 PROBLEM — N13.30 HYDRONEPHROSIS: Status: ACTIVE | Noted: 2020-01-20

## 2020-02-04 PROBLEM — Z87.442 HISTORY OF RENAL CALCULI: Status: ACTIVE | Noted: 2019-11-19

## 2020-02-04 LAB
HBA1C MFR BLD: 5.4 % (ref 0–5.6)
INT CON NEG: NEGATIVE
INT CON POS: POSITIVE

## 2020-02-04 PROCEDURE — 83036 HEMOGLOBIN GLYCOSYLATED A1C: CPT | Performed by: INTERNAL MEDICINE

## 2020-02-04 PROCEDURE — 99204 OFFICE O/P NEW MOD 45 MIN: CPT | Performed by: INTERNAL MEDICINE

## 2020-02-04 RX ORDER — FLUOXETINE HYDROCHLORIDE 20 MG/1
20 CAPSULE ORAL DAILY
Qty: 30 CAP | Refills: 1 | Status: SHIPPED | OUTPATIENT
Start: 2020-02-04 | End: 2020-05-06

## 2020-02-04 RX ORDER — INSULIN GLARGINE 300 U/ML
INJECTION, SOLUTION SUBCUTANEOUS
Qty: 13.5 ML | Refills: 0 | Status: SHIPPED | OUTPATIENT
Start: 2020-02-04 | End: 2020-02-04

## 2020-02-04 NOTE — ASSESSMENT & PLAN NOTE
Previously noted problem, no captured recurrence since December 2016.  Patient is on metoprolol succinate 200 mg daily, will message Dr. Tucker about reducing this dose as noted above.  Not on systemic anticoagulation due to bleeding complications and the fact that he has had no recurrence of atrial fibrillation that we have been able to capture since 2016.  Patient remains asymptomatic to his paroxysmal SVT.

## 2020-02-04 NOTE — ASSESSMENT & PLAN NOTE
Chronic problem that requires medication titration.  I will reach out to the patient's endocrinologist, Dr. Rasheed, to see about dialing back on some of his medications due to what sounds to be symptomatic hypoglycemia.  This is risky for such a frail patient.  His wife is already noted some improvement when she gives only 35 units instead of 40 units of Toujeo at bedtime so this may be a good place to start.  She is also not giving the mealtime insulin if he continues to run low.  His chronic kidney disease certainly complicates the situation is he may not be metabolizing the insulin as fast as previously.

## 2020-02-04 NOTE — ASSESSMENT & PLAN NOTE
Chronic and stable problem.  Continue follow-up with nephrology and urology.  Patient with issues related to recurrent UTIs which have led to wild fluctuations in his kidney function.

## 2020-02-04 NOTE — ASSESSMENT & PLAN NOTE
Chronic problem, requires medication titration.  Patient feels quite overwhelmed and fatigued from the number of medications he is taking.  I will reach out to his cardiologist to see about discontinuing fenofibrate and decreasing metoprolol succinate.  I will reach out to his endocrinologist about decreasing his Toujeo due to improved A1c and symptomatic hypoglycemia.  I will reduce his fluoxetine from 40 to 20 mg today.  We will continue to evaluate medications at each visit and reduce or discontinue what we are able to as outlined above.

## 2020-02-04 NOTE — ASSESSMENT & PLAN NOTE
Chronic problem that requires medication titration.  Patient feels overwhelmed with the number of medications he is taking and would like to trim off any nonessential medications.  I will reach out to Dr. Tucker of cardiology to see if the patient can discontinue the fenofibrate continue on atorvastatin 80 mg daily.  Next lipid panel will be due around April 2020.

## 2020-02-04 NOTE — PROGRESS NOTES
Subjective:     CC:  Diagnoses of Type 2 diabetes mellitus with other specified complication, with long-term current use of insulin (HCC), (HCC) Hyperlipidemia associated with type 2 diabetes mellitus, (HCC) Hypertension associated with diabetes, Lone atrial fibrillation (HCC), (HCC) Moderate episode of recurrent major depressive disorder, Stage 3 chronic kidney disease (HCC), H/O Cerebrovascular accident (CVA) in adulthood, and Polypharmacy were pertinent to this visit.    HISTORY OF THE PRESENT ILLNESS: Patient is a 74 y.o. male. This pleasant patient is here today to establish care and discuss the below stated chronic medical conditions. He is accompanied by his wife, Usha.    Problem   Lone Atrial Fibrillation (Hcc)    At the time of his stroke and new non-Hodgkin's lymphoma cancer diagnosis with brain mets and complicated by septic shock related to norovirus while on chemotherapy, he also was found to have an episode of atrial fibrillation on telemetry.      He reports that this diagnosis has continued to follow him around.  He is on rate control medication in the form of metoprolol succinate 200 mg daily.  He wore a heart monitor which demonstrated paroxysmal SVT but no atrial fibrillation was noted.  Patient denies any issues with palpitations.  He was attempted on systemic anticoagulation in the form of Xarelto which immediately led to melena and he also has a history of other bleeding complications.  He is not on any antiarrhythmics and no subsequent episodes of atrial fibrillation have been captured.    He follows with Dr. Tucker of cardiology.     (HCC) Hypertension associated with diabetes    He has a history of hypertension.  Current regimen includes metoprolol succinate 200 mg daily and losartan 50 mg daily.    Recent addition of alfuzosin 10 mg daily to assist with BPH and recurrent UTIs due to retention.    Previously on spironolactone but this was discontinued due to progressing chronic kidney  disease.    He follows with both cardiology and urology.  He also follows with nephrology.    Patient and his partner report worsening fatigue and are wondering if he could be overtreated with any of his medications.  Blood pressure readings at home have been running in the 110's.  Patient denies overt lightheadedness or dizziness when he is changing positions.     H/O Cerebrovascular accident (CVA) in adulthood    Right hemispheric CVA (12/30/2016):    Presented with LUE weakness, thickened speech and unsteadiness. Stroke work up initiated and patient's MRI showed metastatic disease which led to his diagnosis of non-  Hodgkin's lymphoma.    Patient and his partner report that at the time of the reported stroke patient was ambulatory and had left-sided weakness but not nearly to the degree that he has now.  They report that towards the end of his chemotherapy he contracted norovirus and was in critical status with resultant significant hemiaplasia.     ([facility]) Hyperlipidemia associated with type 2 diabetes mellitus       Ref. Range 10/17/2019 12:22   Cholesterol,Tot Latest Ref Range: 100 - 199 mg/dL 160   Triglycerides Latest Ref Range: 0 - 149 mg/dL 114   HDL Latest Ref Range: >=40 mg/dL 33 (A)   LDL Latest Ref Range: <100 mg/dL 104 (H)     History of stroke, CAD, DM2, and  hyperlipidemia. Currently on high dose atorvastatin 80 mg daily and fenofibrate 124 mg daily. Follows with cardiology, Dr. Tucker.      ([facility]) Moderate episode of recurrent major depressive disorder    He was noted to be depressed after his major medical events in early 2017.  A few years ago his initiated on Prozac and current dose is 40 mg daily.  His wife notes that he is always been emotional and will cry easily at [employer] movies or commercials.  She notes that he still does continue to cry sporadically but it resolves quickly.  No inappropriate laughing or other indication of pseudobulbar affect.  The patient and his wife feel like his mood  is been stable for at least the past year and would be interested in trying to dial back on some of his medications.  They are agreeable to cutting the fluoxetine down to 20 mg daily for the next 4 to 6 weeks with consideration of stopping it altogether to see how he does.     Type 2 Diabetes Mellitus, With Long-Term Current Use of Insulin (Hcc)    Longstanding history of type 2 diabetes on insulin.  Is following with endocrinology, Dr. Rasheed.  He does A1c in fall 2019 was 6.2.  Current regimen includes Trulicity 1.5 mg weekly, Toujeo 40 units at bedtime, and Humalog 5 to 9 units 3 times daily before meals.    Av and Usha noticed that he has had lower fasting blood sugars lately.  He will run anywhere from 60-80 in the morning.  In the evening he will be in the low to mid 100s.  He has had recent challenges with fatigue and daytime somnolence, Usha is concerned that low blood sugars may be playing a role.  She has backed off on his Toujeo to 35 units on some nights to try and mitigate risk of hypoglycemia the next morning.    A1c in clinic today is 5.4.     Stage 3 Chronic Kidney Disease (Hcc)       Ref. Range 1/24/2020 04:21 1/26/2020 16:39 2/3/2020 14:16   Bun Latest Ref Range: 8 - 22 mg/dL 27 (H) 18 20   Creatinine Latest Ref Range: 0.50 - 1.40 mg/dL 1.50 (H) 1.42 (H) 1.43 (H)   GFR If Non  Latest Ref Range: >60 mL/min/1.73 m 2 46 (A) 49 (A) 48 (A)     He has a history of chronic kidney disease related to nephrolithiasis, recurrent UTIs, and BPH as well as history of hypertension and diabetes.  He is following with nephrology, Dr. Jarvis.     Spironolactone was discontinued due to worsening chronic kidney disease.    Other current renally excreted medications include losartan 50 mg daily, potassium chloride 8 mEq daily, and Toujeo/Humalog.     Polypharmacy    Patient is currently on 21 medications and feels very overwhelmed by polypharmacy.  He and his wife would like me to go through each  medication to see if it still truly indicated or reduce where able.  On initial review I think we could consider reducing or discontinuing the following after discussion with patient's other physicians:    - Reduce fluoxetine from 40 mg to 20 mg and discontinue if mood remains stable after 4 to 6 weeks.  -Reduce metoprolol succinate from 200 mg daily to 100 mg daily due to significant fatigue.  -Consider discontinuing allopurinol as he is only had 1-2 episodes of gout over the past 17 years.  -Consider discontinuing fenofibrate and continuing monotherapy with high-dose atorvastatin 80 mg daily.  -Consider decreasing insulin due to A1c of 5.4 and symptomatic hypoglycemia, reduce Toujeo from 40 units to 35 units.  -We will plan to hold magnesium oxide for 4 weeks before next blood draw to see if magnesium levels are sufficient without supplementation.  -Consider holding potassium supplementation for 4 weeks before next blood draw to see if potassium levels are sufficient without supplementation.         Allergies: Diphenhydramine hcl; Lorazepam; Ciprofloxacin; and Spironolactone    Current Outpatient Medications Ordered in Epic   Medication Sig Dispense Refill   • glucose blood (ONE TOUCH ULTRA TEST) strip onetouch ultra blue  strp     • FLUoxetine (PROZAC) 20 MG Cap Take 1 Cap by mouth every day. 30 Cap 1   • aspirin EC (ECOTRIN) 81 MG Tablet Delayed Response Take 81 mg by mouth every day.     • Probiotic Product (PROBIOTIC DAILY PO) Take 1 Cap by mouth 2 Times a Day.     • clopidogrel (PLAVIX) 75 MG Tab Take 75 mg by mouth every day.     • magnesium oxide (MAG-OX) 400 MG Tab Take 400 mg by mouth every day.     • finasteride (PROSCAR) 5 MG Tab Take 1 Tab by mouth every day. 30 Tab 0   • allopurinol (ZYLOPRIM) 100 MG Tab Take 100 mg by mouth every evening.     • atorvastatin (LIPITOR) 80 MG tablet Take 80 mg by mouth every evening.     • fenofibrate (TRICOR) 145 MG Tab Take 145 mg by mouth every evening.     •  fluoxetine (PROZAC) 40 MG capsule Take 40 mg by mouth every day.     • alfuzosin (UROXATRAL) 10 MG SR tablet Take 10 mg by mouth every day.     • methenamine hip (HIPPREX) 1 GM Tab Take 1 g by mouth 2 times a day.     • D-MANNOSE PO Take 1 tablet by mouth 2 Times a Day.     • losartan (COZAAR) 50 MG Tab Take 1 Tab by mouth every day. 90 Tab 3   • Dulaglutide (TRULICITY) 1.5 MG/0.5ML Solution Pen-injector Inject 1.5 mg as instructed every 7 days. On Tue 12 PEN 3   • Insulin Glargine (TOUJEO MAX SOLOSTAR) 300 UNIT/ML Solution Pen-injector Inject 40 Units as instructed every bedtime.     • insulin lispro, Human, (HUMALOG KWIKPEN) 100 UNIT/ML Solution Pen-injector injection Inject 5-9 Units as instructed 3 times a day before meals. Sliding Scale     • potassium chloride (KLOR-CON) 8 MEQ tablet Take 1 Tab by mouth every day. 90 Tab 3   • metoprolol (TOPROL-XL) 200 MG XL tablet Take 1 Tab by mouth every evening. 90 Tab 3   • acetaminophen (TYLENOL) 500 MG Tab Take 1,000 mg by mouth every 6 hours as needed for Mild Pain or Moderate Pain.     • ascorbic acid (VITAMIN C) 500 MG tablet Take 500 mg by mouth every day.     • Cholecalciferol (VITAMIN D) 2000 units Cap Take 4,000 Units by mouth 2 Times a Day.       No current [facility]-ordered facility-administered medications on file.        Past Medical History:   Diagnosis Date   • ([facility]) Chronic ulcer of right lower extremity with fat layer exposed 12/27/2018   • Acute on chronic renal failure (HCC) 1/21/2020   • Acute respiratory failure with hypoxia ([facility]) 5/20/2017   • CAD (coronary artery disease)     GIOVANNA to RCA in '97, GIOVANNA X2 to LAD and GIOVANNA X2 to OM in '09   • Cancer ([facility])     2017; chemo lympoma   • Cataract    • Cerebrovascular accident (CVA) ([facility]) 12/30/2016    Left arm weakness  etiology of stroke not established, lymphoma discovered on MRI evaluation of stroke, L hemiparesis much worse after acute infectious illness in mid 2017, but no specific diagnosed recurrent  neurological etiology, all at [facility]   • CKD (chronic kidney disease) stage 3, GFR 30-59 ml/min ([facility])    • Controlled gout 2014   • Coronary atherosclerosis of native coronary artery     S/P PTCA (percutaneous transluminal coronary angioplasty), RCA, 5/1997, patent on cath 7/10/2009 at the time of interventions on his left anterior descending and circumflex coronary arteries   • Depression    • Diabetes ([facility])    • Enterococcal septicemia ([facility]) 8/12/2017   • Roberto hematuria 1/29/2020   • Hypertension    • Hypokalemia 2012    controlled with combination of ACE inhibitor or ARB plus spironolactone   • Hypomagnesemia 08/12/2017    etiology uncertain   • Influenza A 1/26/2020   • Leg edema, right 1/22/2020   • Lymphoma ([facility]) 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 Barry   • Normocytic hypochromic anemia 5/20/2017   • NSTEMI (non-ST elevated myocardial infarction) ([facility]) 07/18/2017    complicating UTI with sepsis   • Pain    • Polyneuropathy in diabetes(357.2) 9/11/2013   • Renal mass 1/21/2020   • Septic shock ([facility]) 5/20/2017   • Skin ulcer of calf ([facility]) 2015    Right, Dr. Terry and wound care   • Stroke ([facility]) 2016    left sided weakness   • Urinary bladder disorder    • Urinary incontinence    • Wound of left leg 2012    Requiring surgery and debridment, Dr. Moore       Past Surgical History:   Procedure Laterality Date   • CYSTOSCOPY STENT PLACEMENT Left 2/12/2018    Procedure: CYSTOSCOPY  ;  Surgeon: Rey Barry M.D.;  Location: SURGERY [facility];  Service: Urology   • URETEROSCOPY Left 2/12/2018    Procedure: URETEROSCOPY- FLEXIBLE  ;  Surgeon: Rey Barry M.D.;  Location: SURGERY [facility];  Service: Urology   • LASERTRIPSY Left 2/12/2018    Procedure: LASERTRIPSY - LITHO  ;  Surgeon: Rey Barry M.D.;  Location: SURGERY [facility];  Service: Urology   • WOUND CLOSURE GENERAL   4/3/2012    Performed by GERHARD GILBERT at SURGERY SAME DAY [facility] ORS   • ZZZ CARDIAC CATH  2009    Stents to LAD, Om   • DAGOBERTO BY LAPAROSCOPY  1998   • ANGIOPLASTY  1997    RCA followed by other stents as noted above.    • ZZZ CARDIAC CATH  1997    stent RCA   • CATARACT EXTRACTION WITH IOL      bilateral   • LITHOTRIPSY     • TONSILLECTOMY AND ADENOIDECTOMY         Social History     Tobacco Use   • Smoking status: Never Smoker   • Smokeless tobacco: Never Used   • Tobacco comment: continued abstinence   Substance Use Topics   • Alcohol use: No   • Drug use: No       Social History     Patient does not qualify to have social determinant information on file (likely too young).   Social History Narrative    Av is from [place], [place] and raised in [place]. He has been in [place] since 2004. 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  He has been  to Usha since 2003, they met through a mutual friend. He has a daughter (Kalina) and a step son (Jimi).       Family History   Problem Relation Age of Onset   • Heart Disease Father         CAD   • Diabetes Father    • Cancer Mother    • Psychiatric Illness Mother         Depression   • Depression Mother    • Kidney stones Brother    • Heart Disease Brother    • Psychiatric Illness Brother         Depression   • Depression Brother    • Suicide Attempts Other    • Psychiatric Illness Other         autism       Health Maintenance: Completed    ROS:   As above in the HPI All Others Reviewed and Negative  Objective:     Exam: /64 (BP Location: Left arm, Patient Position: Sitting, BP Cuff Size: Adult)   Pulse 62   Temp 35.9 °C (96.6 °F) (Temporal)   Ht 1.829 m (6')   Wt 90.7 kg (200 lb)   SpO2 100%  Body mass index is  27.12 kg/m².    General: Normal appearing. No distress. Chronically ill appearing. Appears stated age.  EENT: Eyes conjunctiva clear, mild left lid ptosis, ears normal shape and contour, canals are clear bilaterally, tympanic membranes are benign, oropharynx is without erythema, edema or exudates. Fair dentition.   Neck: Supple without JVD. Thyroid is not enlarged.  Pulmonary: Clear to ausculation.  Normal effort. No rales, ronchi, or wheezing. No cough.  Cardiovascular: Regular rate and rhythm without murmur. No lower extremity edema bilaterally.  Abdomen: Soft, nontender, nondistended. Normal bowel sounds.   Neurologic: Left hemiplegia (left arm > left leg), left facial drooping (mild).   Lymph: No cervical or supraclavicular lymph nodes are palpable  Skin: Warm and dry.  No obvious lesions on skin exposed areas.  Musculoskeletal: Abnormal gait. Patient ambulates with a wheelchair in clinic and a walker at home.  Psych: Normal mood and affect. Alert and oriented x3. Judgment and insight is normal.    Assessment & Plan:   74 y.o. male with the following -    Problem List Items Addressed This Visit     (HCC) Hyperlipidemia associated with type 2 diabetes mellitus     Chronic problem that requires medication titration.  Patient feels overwhelmed with the number of medications he is taking and would like to trim off any nonessential medications.  I will reach out to Dr. Tucker of cardiology to see if the patient can discontinue the fenofibrate continue on atorvastatin 80 mg daily.  Next lipid panel will be due around April 2020.         H/O Cerebrovascular accident (CVA) in adulthood     Previous problem, felt to be related to metastatic disease from non-Hodgkin's lymphoma.  He is on high intensity aspirin and dual antiplatelet therapy.  Continues to work with therapy 3 days/week due to residual hemiplegia however this is felt to be due to complications of septic shock from norovirus at the end of his chemotherapy as  he did not have severe deficits at the time of the stroke itself.         (HCC) Hypertension associated with diabetes     Chronic problem with recent medication addition.  Patient has worsening fatigue and had recent addition of alfuzosin.  We will update Dr. Tucker of this change to see if we can reduce his metoprolol succinate as patient denies any issues palpitations and feels incredibly fatigued.  Continue losartan unchanged.         Lone atrial fibrillation (HCC)     Previously noted problem, no captured recurrence since December 2016.  Patient is on metoprolol succinate 200 mg daily, will message Dr. Tucker about reducing this dose as noted above.  Not on systemic anticoagulation due to bleeding complications and the fact that he has had no recurrence of atrial fibrillation that we have been able to capture since 2016.  Patient remains asymptomatic to his paroxysmal SVT.         Polypharmacy     Chronic problem, requires medication titration.  Patient feels quite overwhelmed and fatigued from the number of medications he is taking.  I will reach out to his cardiologist to see about discontinuing fenofibrate and decreasing metoprolol succinate.  I will reach out to his endocrinologist about decreasing his Toujeo due to improved A1c and symptomatic hypoglycemia.  I will reduce his fluoxetine from 40 to 20 mg today.  We will continue to evaluate medications at each visit and reduce or discontinue what we are able to as outlined above.         Stage 3 chronic kidney disease (HCC)     Chronic and stable problem.  Continue follow-up with nephrology and urology.  Patient with issues related to recurrent UTIs which have led to wild fluctuations in his kidney function.         Type 2 diabetes mellitus, with long-term current use of insulin (HCC)     Chronic problem that requires medication titration.  I will reach out to the patient's endocrinologist, Dr. Rasheed, to see about dialing back on some of his medications  due to what sounds to be symptomatic hypoglycemia.  This is risky for such a frail patient.  His wife is already noted some improvement when she gives only 35 units instead of 40 units of Toujeo at bedtime so this may be a good place to start.  She is also not giving the mealtime insulin if he continues to run low.  His chronic kidney disease certainly complicates the situation is he may not be metabolizing the insulin as fast as previously.         Relevant Orders    POCT  A1C (Completed)    ([facility]) Moderate episode of recurrent major depressive disorder     Chronic problem that needs medication titration.  We will reduce fluoxetine to 20 mg daily.  His wife will monitor the Av's mood over the next 1 to 2 months and if he remains stable then we may try to get him off the medicine altogether.  He did not require any antidepressants before his stroke and cancer diagnoses.         Relevant Medications    FLUoxetine (PROZAC) 20 MG Cap           Return in about 11 weeks (around 4/21/2020).    Please note that this dictation was created using voice recognition software. I have made every reasonable attempt to correct obvious errors, but I expect that there are errors of grammar and possibly content that I did not discover before finalizing the note.

## 2020-02-04 NOTE — TELEPHONE ENCOUNTER
Received request via: Patient    Was the patient seen in the last year in this department? Yes LOV: 10/15/19    Does the patient have an active prescription (recently filled or refills available) for medication(s) requested? No     TOUJEO SOLOSTAR 300 UNIT/ML Solution Pen-injector    Sig: INJECT 45 UNITS AS DIRECTED EVERY NIGHT AT BEDTIME

## 2020-02-04 NOTE — ASSESSMENT & PLAN NOTE
Previous problem, felt to be related to metastatic disease from non-Hodgkin's lymphoma.  He is on high intensity aspirin and dual antiplatelet therapy.  Continues to work with therapy 3 days/week due to residual hemiplegia however this is felt to be due to complications of septic shock from norovirus at the end of his chemotherapy as he did not have severe deficits at the time of the stroke itself.

## 2020-02-04 NOTE — ASSESSMENT & PLAN NOTE
Chronic problem that needs medication titration.  We will reduce fluoxetine to 20 mg daily.  His wife will monitor the Av's mood over the next 1 to 2 months and if he remains stable then we may try to get him off the medicine altogether.  He did not require any antidepressants before his stroke and cancer diagnoses.

## 2020-02-04 NOTE — ASSESSMENT & PLAN NOTE
Chronic problem with recent medication addition.  Patient has worsening fatigue and had recent addition of alfuzosin.  We will update Dr. Tucker of this change to see if we can reduce his metoprolol succinate as patient denies any issues palpitations and feels incredibly fatigued.  Continue losartan unchanged.

## 2020-02-06 ENCOUNTER — OFFICE VISIT (OUTPATIENT)
Dept: NEPHROLOGY | Facility: MEDICAL CENTER | Age: 75
End: 2020-02-06
Payer: MEDICARE

## 2020-02-06 VITALS
HEART RATE: 66 BPM | HEIGHT: 72 IN | WEIGHT: 208 LBS | BODY MASS INDEX: 28.17 KG/M2 | DIASTOLIC BLOOD PRESSURE: 74 MMHG | RESPIRATION RATE: 16 BRPM | SYSTOLIC BLOOD PRESSURE: 120 MMHG | TEMPERATURE: 97.6 F | OXYGEN SATURATION: 99 %

## 2020-02-06 DIAGNOSIS — E11.29 MICROALBUMINURIA DUE TO TYPE 2 DIABETES MELLITUS (HCC): ICD-10-CM

## 2020-02-06 DIAGNOSIS — E55.9 VITAMIN D DEFICIENCY: ICD-10-CM

## 2020-02-06 DIAGNOSIS — I10 ESSENTIAL HYPERTENSION: ICD-10-CM

## 2020-02-06 DIAGNOSIS — N39.0 RECURRENT UTI: ICD-10-CM

## 2020-02-06 DIAGNOSIS — N18.30 CKD (CHRONIC KIDNEY DISEASE), STAGE III: ICD-10-CM

## 2020-02-06 DIAGNOSIS — R33.9 URINARY RETENTION: ICD-10-CM

## 2020-02-06 DIAGNOSIS — D64.9 ANEMIA, UNSPECIFIED TYPE: ICD-10-CM

## 2020-02-06 DIAGNOSIS — R80.9 MICROALBUMINURIA DUE TO TYPE 2 DIABETES MELLITUS (HCC): ICD-10-CM

## 2020-02-06 PROCEDURE — 99214 OFFICE O/P EST MOD 30 MIN: CPT | Performed by: INTERNAL MEDICINE

## 2020-02-06 ASSESSMENT — ENCOUNTER SYMPTOMS
ABDOMINAL PAIN: 0
SHORTNESS OF BREATH: 0
HEMOPTYSIS: 0
VOMITING: 0
WHEEZING: 0
PALPITATIONS: 0
FEVER: 0
WEIGHT LOSS: 0
DIARRHEA: 0
BLOOD IN STOOL: 0
CHILLS: 0
ORTHOPNEA: 0
NAUSEA: 0
SINUS PAIN: 0
CONSTIPATION: 0
EYES NEGATIVE: 1
COUGH: 0
FLANK PAIN: 0

## 2020-02-06 NOTE — PROGRESS NOTES
Subjective:      Av Bob is a 74 y.o. male who presents with Follow-Up and Chronic Kidney Disease            Chronic Kidney Disease   Pertinent negatives include no abdominal pain, chest pain, chills, congestion, coughing, fever, nausea or vomiting.     Av is coming today for f/u of CKD III, microalbuminuria  recurrent UTI,   recently hospitalized with gross hematuria, UTI , urinary retention, bilateral hydronephrosis   - f/u with Urology -scheduled for tomorrow  Creat level stable at 1.4 -baseline  Doing better now , no complaints  No difficulties to urinate.No dysuria No flank pain  HTN: BP well controlled    Review of Systems   Constitutional: Negative for chills, fever, malaise/fatigue and weight loss.   HENT: Negative for congestion, hearing loss and sinus pain.    Eyes: Negative.    Respiratory: Negative for cough, hemoptysis, shortness of breath and wheezing.    Cardiovascular: Negative for chest pain, palpitations, orthopnea and leg swelling.   Gastrointestinal: Negative for abdominal pain, blood in stool, constipation, diarrhea, melena, nausea and vomiting.   Genitourinary: Negative for dysuria, flank pain, frequency, hematuria and urgency.   Skin: Negative.    All other systems reviewed and are negative.    Past Medical History:   Diagnosis Date   • (HCC) Chronic ulcer of right lower extremity with fat layer exposed 12/27/2018   • Acute on chronic renal failure ([facility]) 1/21/2020   • Acute respiratory failure with hypoxia ([facility]) 5/20/2017   • CAD (coronary artery disease)     GIOVANNA to RCA in '97, GIOVANNA X2 to LAD and GIOVANNA X2 to OM in '09   • Cancer (HCC)     2017; chemo lympoma   • Cataract    • Cerebrovascular accident (CVA) ([facility]) 12/30/2016    Left arm weakness  etiology of stroke not established, lymphoma discovered on MRI evaluation of stroke, L hemiparesis much worse after acute infectious illness in mid 2017, but no specific diagnosed recurrent neurological etiology, all at [facility]  [facility]   • CKD (chronic kidney disease) stage 3, GFR 30-59 ml/min ([facility])    • Controlled gout 2014   • Coronary atherosclerosis of native coronary artery     S/P PTCA (percutaneous transluminal coronary angioplasty), RCA, 5/1997, patent on cath 7/10/2009 at the time of interventions on his left anterior descending and circumflex coronary arteries   • Depression    • Diabetes ([facility])    • Enterococcal septicemia ([facility]) 8/12/2017   • Roberto hematuria 1/29/2020   • Hypertension    • Hypokalemia 2012    controlled with combination of ACE inhibitor or ARB plus spironolactone   • Hypomagnesemia 08/12/2017    etiology uncertain   • Influenza A 1/26/2020   • Leg edema, right 1/22/2020   • 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 Barry   • Normocytic hypochromic anemia 5/20/2017   • NSTEMI (non-ST elevated myocardial infarction) ([facility]) 07/18/2017    complicating UTI with sepsis   • Pain    • Polyneuropathy in diabetes(357.2) 9/11/2013   • Renal mass 1/21/2020   • Septic shock ([facility]) 5/20/2017   • Skin ulcer of calf ([facility]) 2015    Right, Dr. Terry and wound care   • Stroke ([facility]) 2016    left sided weakness   • Urinary bladder disorder    • Urinary incontinence    • Wound of left leg 2012    Requiring surgery and debridment, Dr. Moore       Family History   Problem Relation Age of Onset   • Heart Disease Father         CAD   • Diabetes Father    • Cancer Mother    • Psychiatric Illness Mother         Depression   • Depression Mother    • Kidney stones Brother    • Heart Disease Brother    • Psychiatric Illness Brother         Depression   • Depression Brother    • Suicide Attempts Other    • Psychiatric Illness Other         autism       Social History     Socioeconomic History   • Marital status:      Spouse name: Not on file   • Number of children: Not on file   • Years of education: Not on file   •  Highest education level: Not on file   Occupational History   • Not on file   Social Needs   • Financial resource strain: Not on file   • Food insecurity:     Worry: Not on file     Inability: Not on file   • Transportation needs:     Medical: Not on file     Non-medical: Not on file   Tobacco Use   • Smoking status: Never Smoker   • Smokeless tobacco: Never Used   • Tobacco comment: continued abstinence   Substance and Sexual Activity   • Alcohol use: No   • Drug use: No   • Sexual activity: Not Currently     Partners: Female     Comment: , one daughter, 2 grands   Lifestyle   • Physical activity:     Days per week: Not on file     Minutes per session: Not on file   • Stress: Not on file   Relationships   • Social connections:     Talks on phone: Not on file     Gets together: Not on file     Attends Gnosticism service: Not on file     Active member of club or organization: Not on file     Attends meetings of clubs or organizations: Not on file     Relationship status: Not on file   • Intimate partner violence:     Fear of current or ex partner: Not on file     Emotionally abused: Not on file     Physically abused: Not on file     Forced sexual activity: Not on file   Other Topics Concern   • Not on file   Social History Narrative    Av is from [place], [place] and raised in [place]. He has been in [place] since 2004. 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  He has been  to Usha since 2003, they met through a mutual friend. He has a daughter (Kalina) and a step son (Jimi).          Objective:     /74 (BP Location: Right arm, Patient Position: Sitting)   Pulse 66   Temp 36.4 °C (97.6 °F)   Resp 16   Ht 1.829 m (6')   Wt 94.3 kg (208 lb)   SpO2 99%   BMI  28.21 kg/m²          Physical Exam  Vitals signs reviewed.   Constitutional:       General: He is not in acute distress.     Appearance: Normal appearance. He is well-developed. He is not diaphoretic.   HENT:      Head: Normocephalic and atraumatic.      Nose: Nose normal. No congestion.      Mouth/Throat:      Mouth: Mucous membranes are moist.      Pharynx: Oropharynx is clear.   Eyes:      General: No scleral icterus.     Conjunctiva/sclera: Conjunctivae normal.      Pupils: Pupils are equal, round, and reactive to light.   Neck:      Musculoskeletal: Normal range of motion and neck supple.   Cardiovascular:      Rate and Rhythm: Normal rate and regular rhythm.      Pulses: Normal pulses.      Heart sounds: Normal heart sounds. No gallop.    Pulmonary:      Effort: Pulmonary effort is normal. No respiratory distress.      Breath sounds: Normal breath sounds. No wheezing, rhonchi or rales.   Abdominal:      General: Bowel sounds are normal. There is no distension.      Palpations: There is no mass.      Tenderness: There is no tenderness. There is no right CVA tenderness, left CVA tenderness, guarding or rebound.      Hernia: No hernia is present.   Musculoskeletal:      Right lower leg: No edema.      Left lower leg: No edema.   Skin:     General: Skin is warm.      Coloration: Skin is not jaundiced.      Findings: No erythema or rash.   Neurological:      Mental Status: He is alert and oriented to person, place, and time.      Cranial Nerves: No cranial nerve deficit.      Coordination: Coordination normal.            Laboratory results reviewed: d/w pt    Lab Results   Component Value Date/Time    CREATININE 1.43 (H) 02/03/2020 02:16 PM    CREATININE 1.4 05/28/2008 05:42 PM    POTASSIUM 4.3 02/03/2020 02:16 PM     Assessment and Plan    1.CKD III-creat stable at baseline -to monitor  2.HTN: BP well controlled -to monitor  3.Electrolytes: well controlled.  4.Anemia: drop in Hb level during hospitalization  -improving  5.Urinary retention/hydronephrosis -f/u with Urology  6.Volume:well controlled  7.Microalbuminurie due to DM II -improved to normal range -to monitor -on ARB  8.Vit D def: vit D and PTH well controlled -WNL    Recs: continue current treatment             Keep well hydrated             Low Na diet             F/u with Urology             Monitor BP             F/u here in 3 months

## 2020-02-18 RX ORDER — ALLOPURINOL 100 MG/1
TABLET ORAL
Qty: 90 TAB | Refills: 1 | Status: SHIPPED | OUTPATIENT
Start: 2020-02-18 | End: 2020-02-20 | Stop reason: SDUPTHER

## 2020-02-20 RX ORDER — ALLOPURINOL 100 MG/1
100 TABLET ORAL
Qty: 100 TAB | Refills: 3 | Status: SHIPPED | OUTPATIENT
Start: 2020-02-20 | End: 2020-05-30

## 2020-03-15 NOTE — PROGRESS NOTES
Endocrinology Clinic Progress Note  PCP: Madison Bunch D.O.    HPI:  Eligio Madrigal is a 74 y.o. old patient who comes in today for routine follow up of Management of Controlled Type 2 Diabetes with stage 3 chronic kidney disease, Hypertension, Hyperlipidemia, and Vitamin D Deficiency.  He has had recurrent urinary tract infections and fatigue.  He is working with his PCP to reduce the amount of medications he is taking.     Vitamin D Deficiency: Currently on Vitamin D 4000 iu BID and multivitamin.  Results for ELIGIO MADRIGAL (MRN [id number]) as of 3/15/2020 10:55   Ref. Range 5/22/2019 14:20   25-Hydroxy   Vitamin D 25 Latest Ref Range: 30 - 100 ng/mL 35        Hypertension:  Controlled with Losartan 50 mg per day and Metoprolol XL 100 mg daily.     Hyperlipidemia:  Controlled with Atorvastatin 80 mg per day.  Results for ELIGIO MADRIGAL (MRN [id number]) as of 3/15/2020 10:55   Ref. Range 10/17/2019 12:22   Cholesterol,Tot Latest Ref Range: 100 - 199 mg/dL 160   Triglycerides Latest Ref Range: 0 - 149 mg/dL 114   HDL Latest Ref Range: >=40 mg/dL 33 (A)   LDL Latest Ref Range: <100 mg/dL 104 (H)       HPI:  Eligio Madrigal is a 74 y.o. old patient who comes in today for evaluation of above stated problem.    Most Recent HbA1c:   Lab Results   Component Value Date/Time    HBA1C 5.4 02/04/2020 11:45 AM        Current Diabetes Regimen:  GLP-1 Agent: Dulaglutide 1.5 mg once weekly   Basal Insulin: Toujeo 35 units daily.   Prandial Insulin: Humalog 5 units plus 1:50>150.      Before Breakfast:  Before Lunch:  Before Dinner:  Before Bedtime:  Other times:  Hypoglycemia:  {NDKHYPOGLYCEMIA:29518}    ROS:  Constitutional: No weight loss  Cardiac: No palpitations or racing heart  Resp: No shortness of breath  Neuro: No numbness or tinging in feet  Endo: No heat or cold intolerance, no polyuria or polydipsia  All other systems were reviewed and were negative.    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     Priority: Medium   • (HCC) Hypertension associated with diabetes 03/22/2017     Priority: Medium   • H/O Cerebrovascular accident (CVA) in adulthood 12/30/2016     Priority: Medium   • Coronary artery disease involving native coronary artery 12/30/2016     Priority: Medium   • (HCC) Hyperlipidemia associated with type 2 diabetes mellitus 12/13/2011     Priority: Medium   • GERD with esophagitis 10/19/2018     Priority: Low   • Recurrent UTI 04/26/2018     Priority: Low   • (HCC) Left hemiparesis 12/27/2017     Priority: Low   • ([facility]) Diabetic nephropathy associated with type 2 diabetes mellitus 11/30/2017     Priority: Low   • Psychophysiological insomnia 11/21/2017     Priority: Low   • ([facility]) Moderate episode of recurrent major depressive disorder 11/07/2017     Priority: Low   • Wheelchair dependent 11/07/2017     Priority: Low   • H/O non-Hodgkin's lymphoma 05/08/2017     Priority: Low   • Type 2 diabetes mellitus, with long-term current use of insulin ([facility]) 12/30/2016     Priority: Low   • Stage 3 chronic kidney disease ([facility]) 08/25/2016     Priority: Low   • Idiopathic chronic gout of multiple sites without tophus 01/28/2014     Priority: Low   • Polypharmacy 02/04/2020   • Renal cyst 01/29/2020   • Benign prostatic hyperplasia with urinary obstruction 01/29/2020   • Altered mobility due to old stroke 01/22/2020   • Normocytic anemia 01/21/2020   • History of renal calculi 11/19/2019   • Neurogenic bladder 11/19/2019   • PVD (peripheral vascular disease) ([facility]) 10/08/2019   • Strain of flexor muscle of right hip 05/20/2019   • Muscle strain of right gluteal region 05/20/2019   • Left hand weakness 05/20/2019   • Urinary incontinence 02/19/2019   • ([facility]) Tibial artery disease 12/27/2018       Past Surgical History:  Past Surgical History:   Procedure  Laterality Date   • CYSTOSCOPY STENT PLACEMENT Left 2/12/2018    Procedure: CYSTOSCOPY  ;  Surgeon: Rey Barry M.D.;  Location: SURGERY [facility];  Service: Urology   • URETEROSCOPY Left 2/12/2018    Procedure: URETEROSCOPY- FLEXIBLE  ;  Surgeon: Rey Barry M.D.;  Location: SURGERY [facility];  Service: Urology   • LASERTRIPSY Left 2/12/2018    Procedure: LASERTRIPSY - LITHO  ;  Surgeon: Rey Barry M.D.;  Location: SURGERY [facility];  Service: Urology   • WOUND CLOSURE GENERAL  4/3/2012    Performed by GERHARD GILBERT at SURGERY SAME DAY [facility] ORS   • ZZZ CARDIAC CATH  2009    Stents to LAD, Om   • DAGOBERTO BY LAPAROSCOPY  1998   • ANGIOPLASTY  1997    RCA followed by other stents as noted above.    • ZZZ CARDIAC CATH  1997    stent RCA   • CATARACT EXTRACTION WITH IOL      bilateral   • LITHOTRIPSY     • TONSILLECTOMY AND ADENOIDECTOMY         Allergies:  Diphenhydramine hcl; Lorazepam; Ciprofloxacin; and Spironolactone    Social History:  Social History     Socioeconomic History   • Marital status:      Spouse name: Not on file   • Number of children: Not on file   • Years of education: Not on file   • Highest education level: Not on file   Occupational History   • Not on file   Social Needs   • Financial resource strain: Not on file   • Food insecurity     Worry: Not on file     Inability: Not on file   • Transportation needs     Medical: Not on file     Non-medical: Not on file   Tobacco Use   • Smoking status: Never Smoker   • Smokeless tobacco: Never Used   • Tobacco comment: continued abstinence   Substance and Sexual Activity   • Alcohol use: No   • Drug use: No   • Sexual activity: Not Currently     Partners: Female     Comment: , one daughter, 2 grands   Lifestyle   • Physical activity     Days per week: Not on file     Minutes per session: Not on file   • Stress: Not on file   Relationships   • Social connections     Talks on phone: Not on file     Gets together:  Not on file     Attends Jain service: Not on file     Active member of club or organization: Not on file     Attends meetings of clubs or organizations: Not on file     Relationship status: Not on file   • Intimate partner violence     Fear of current or ex partner: Not on file     Emotionally abused: Not on file     Physically abused: Not on file     Forced sexual activity: Not on file   Other Topics Concern   • Not on file   Social History Narrative    Av is from [place], [place] and raised in [place]. He has been in [place] since 2004. 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  He has been  to Usha since 2003, they met through a mutual friend. He has a daughter (Kalina) and a step son (Jimi).       Family History:  Family History   Problem Relation Age of Onset   • Heart Disease Father         CAD   • Diabetes Father    • Cancer Mother    • Psychiatric Illness Mother         Depression   • Depression Mother    • Kidney stones Brother    • Heart Disease Brother    • Psychiatric Illness Brother         Depression   • Depression Brother    • Suicide Attempts Other    • Psychiatric Illness Other         autism       Medications:    Current Outpatient Medications:   •  fluoxetine (PROZAC) 40 MG capsule, TAKE 1 CAPSULE BY MOUTH EVERY DAY, Disp: 90 Cap, Rfl: 3  •  allopurinol (ZYLOPRIM) 100 MG Tab, Take 1 Tab by mouth every day for 100 days., Disp: 100 Tab, Rfl: 3  •  ONE TOUCH ULTRA TEST strip, USE TO TEST FIVE TIMES DAILY, Disp: 600 Strip, Rfl: 1  •  FLUoxetine (PROZAC) 20 MG Cap, Take 1 Cap by mouth every day., Disp: 30 Cap, Rfl: 1  •  aspirin EC (ECOTRIN) 81 MG Tablet Delayed Response, Take 81 mg by mouth every day., Disp: , Rfl:   •  Probiotic Product (PROBIOTIC DAILY  PO), Take 1 Cap by mouth 2 Times a Day., Disp: , Rfl:   •  clopidogrel (PLAVIX) 75 MG Tab, Take 75 mg by mouth every day., Disp: , Rfl:   •  magnesium oxide (MAG-OX) 400 MG Tab, Take 400 mg by mouth every day., Disp: , Rfl:   •  finasteride (PROSCAR) 5 MG Tab, Take 1 Tab by mouth every day., Disp: 30 Tab, Rfl: 0  •  atorvastatin (LIPITOR) 80 MG tablet, Take 80 mg by mouth every evening., Disp: , Rfl:   •  fenofibrate (TRICOR) 145 MG Tab, Take 145 mg by mouth every evening., Disp: , Rfl:   •  alfuzosin (UROXATRAL) 10 MG SR tablet, Take 10 mg by mouth every day., Disp: , Rfl:   •  methenamine hip (HIPPREX) 1 GM Tab, Take 1 g by mouth 2 times a day., Disp: , Rfl:   •  D-MANNOSE PO, Take 1 tablet by mouth 2 Times a Day., Disp: , Rfl:   •  losartan (COZAAR) 50 MG Tab, Take 1 Tab by mouth every day., Disp: 90 Tab, Rfl: 3  •  Dulaglutide (TRULICITY) 1.5 MG/0.5ML Solution Pen-injector, Inject 1.5 mg as instructed every 7 days. On Tue, Disp: 12 PEN, Rfl: 3  •  Insulin Glargine (TOUJEO MAX SOLOSTAR) 300 UNIT/ML Solution Pen-injector, Inject 40 Units as instructed every bedtime., Disp: , Rfl:   •  insulin lispro, Human, (HUMALOG KWIKPEN) 100 UNIT/ML Solution Pen-injector injection, Inject 5-9 Units as instructed 3 times a day before meals. Sliding Scale, Disp: , Rfl:   •  potassium chloride (KLOR-CON) 8 MEQ tablet, Take 1 Tab by mouth every day., Disp: 90 Tab, Rfl: 3  •  metoprolol (TOPROL-XL) 200 MG XL tablet, Take 1 Tab by mouth every evening., Disp: 90 Tab, Rfl: 3  •  acetaminophen (TYLENOL) 500 MG Tab, Take 1,000 mg by mouth every 6 hours as needed for Mild Pain or Moderate Pain., Disp: , Rfl:   •  ascorbic acid (VITAMIN C) 500 MG tablet, Take 500 mg by mouth every day., Disp: , Rfl:   •  Cholecalciferol (VITAMIN D) 2000 units Cap, Take 4,000 Units by mouth 2 Times a Day., Disp: , Rfl:     Labs: Reviewed    Physical Examination:  Vital signs: There were no vitals taken for this visit. There is no height or weight on file  to calculate BMI.  General: No apparent distress, cooperative  Eyes: No scleral icterus or discharge  ENMT: Normal on external inspection of nose, lips, normal thyroid exam  Neck: No abnormal masses on inspection  Resp: Normal effort, clear to auscultation bilaterally   CVS: Regular rate and rhythm, S1 S2 normal, no murmur   Extremities: No edema  Abdomen: abdominal obesity present  Neuro: Alert and oriented  Skin: No rash  Psych: Normal mood and affect, intact memory and able to make informed decisions    Foot Exam:  Monofilament: {DONE:89704}  Monofilament testing with a 10 gram force: sensation intact: {Sensation:73096524}  Visual Inspection: Feet {w-w/o:5700} maceration, ulcers, fissures.  Pedal pulses: {Pedal Pulses:11007604}    Assessment and Plan:    1. (HCC) 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, without long-term current use of insulin  ***    2. Vitamin D deficiency  ***    3. Essential hypertension  ***    4. Mixed hyperlipidemia  ***      No follow-ups on file.  The following was reviewed  - Discussed diabetic diet discussed in detail-plate method.  - He will test before meals and log .  - He will walk for 20-30 minutes daily.  - Reviewed medications and advised how to take   - Discussed importance of immunizations and yearly eye exams. He saw Dr. Cox in January 2020  - Advised daily foot exams. Educated on signs of infection.   - Educated on need to stay well hydrated with water.  - Educated to call with any questions or problems.    Total face to face time spent with patient equals 60 minutes. 35/60 minutes were spent on counseling the patient about the mechanism of action, side effects and benefits of GLP-1 therapy, SGLT-Inhibitor therapy. I also counseled the patient on hypoglycemia recognition and management. First dose of GLP-1 agent was given in office.    Thank you for allowing me to participate in the care  of this patient.    Dr. Fran Rasheed      CC:   Madison Bunch D.O.    This note was created using voice recognition software (Dragon). The accuracy of the dictation is limited by the abilities of the software. I have reviewed the note prior to signing, however some errors in grammar and context are still possible. If you have any questions related to this note please do not hesitate to contact our office.

## 2020-03-17 ENCOUNTER — APPOINTMENT (OUTPATIENT)
Dept: ENDOCRINOLOGY | Facility: MEDICAL CENTER | Age: 75
End: 2020-03-17
Payer: MEDICARE

## 2020-04-09 DIAGNOSIS — E87.6 HYPOKALEMIA: ICD-10-CM

## 2020-04-09 RX ORDER — POTASSIUM CHLORIDE 600 MG/1
TABLET, FILM COATED, EXTENDED RELEASE ORAL
Qty: 90 TAB | Refills: 3 | Status: SHIPPED | OUTPATIENT
Start: 2020-04-09 | End: 2021-04-12

## 2020-04-12 NOTE — PROGRESS NOTES
Endocrinology Clinic Progress Note  PCP: Madison Bunch D.O.    HPI:  This encounter was conducted via Zoom.  Verbal consent was obtained. Patient's identity was verified.  Eligio Madrigal is a 75 y.o. old patient who comes in today for routine follow up of Management of Uncontrolled Type 2 Diabetes with stage 3 chronic kidney disease, Hypertension, Hyperlipidemia, and Vitamin D Deficiency.  Home from the hospital for the last 4 weeks and doing well.     Vitamin D Deficiency: Currently on Vitamin D 4000 iu BID and multivitamin.  Results for ELIGIO MADRIGAL (MRN [id number]) as of 4/12/2020 09:31   Ref. Range 5/22/2019 14:20   25-Hydroxy   Vitamin D 25 Latest Ref Range: 30 - 100 ng/mL 35     Hypertension:  Controlled with Losartan 50 mg per day and Metoprolol XL 200 mg daily.     Hyperlipidemia:  Currently taking Atorvastatin 80 mg per day.  Results for ELIGIO MADRIGAL (MRN [id number]) as of 4/12/2020 09:31   Ref. Range 10/17/2019 12:22   Cholesterol,Tot Latest Ref Range: 100 - 199 mg/dL 160   Triglycerides Latest Ref Range: 0 - 149 mg/dL 114   HDL Latest Ref Range: >=40 mg/dL 33 (A)   LDL Latest Ref Range: <100 mg/dL 104 (H)       HPI:  Eligio Madrigal is a 75 y.o. old patient who comes in today for evaluation of above stated problem.    Most Recent HbA1c:   Lab Results   Component Value Date/Time    HBA1C 5.4 02/04/2020 11:45 AM        Current Diabetes Regimen:  GLP-1 Agent: Dulaglutide 1.5 mg once weekly   SGLT-2 Inhibitor:  Farxiga stopped due to yeast infection  Basal Insulin: Toujeo 32 units daily  Prandial Insulin: Humalog base of 5 units plus 2:50>150 correction.  Testing blood sugars 3 times daily  Before Breakfast: <100    Before Dinner:     Before Bedtime: 100-200    Hypoglycemia:  Was going low in the early morning    ROS:  Constitutional: No weight loss  Cardiac: No palpitations or racing heart  Resp: No shortness of breath  Neuro: No numbness or tinging in  feet  Endo: No heat or cold intolerance, no polyuria or polydipsia  All other systems were reviewed and were negative.    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[facility]) 05/23/2017     Priority: Medium   • ([facility]) Hypertension associated with diabetes 03/22/2017     Priority: Medium   • H/O Cerebrovascular accident (CVA) in adulthood 12/30/2016     Priority: Medium   • Coronary artery disease involving native coronary artery 12/30/2016     Priority: Medium   • ([facility]) Hyperlipidemia associated with type 2 diabetes mellitus 12/13/2011     Priority: Medium   • GERD with esophagitis 10/19/2018     Priority: Low   • Recurrent UTI 04/26/2018     Priority: Low   • ([facility]) Left hemiparesis 12/27/2017     Priority: Low   • ([facility]) Diabetic nephropathy associated with type 2 diabetes mellitus 11/30/2017     Priority: Low   • Psychophysiological insomnia 11/21/2017     Priority: Low   • ([facility]) Moderate episode of recurrent major depressive disorder 11/07/2017     Priority: Low   • Wheelchair dependent 11/07/2017     Priority: Low   • H/O non-Hodgkin's lymphoma 05/08/2017     Priority: Low   • Type 2 diabetes mellitus, with long-term current use of insulin ([facility]) 12/30/2016     Priority: Low   • Stage 3 chronic kidney disease ([facility]) 08/25/2016     Priority: Low   • Idiopathic chronic gout of multiple sites without tophus 01/28/2014     Priority: Low   • Polypharmacy 02/04/2020   • Renal cyst 01/29/2020   • Benign prostatic hyperplasia with urinary obstruction 01/29/2020   • Altered mobility due to old stroke 01/22/2020   • Normocytic anemia 01/21/2020   • History of renal calculi 11/19/2019   • Neurogenic bladder 11/19/2019   • PVD (peripheral vascular disease) ([facility]) 10/08/2019   • Strain of flexor muscle of right hip 05/20/2019   • Muscle strain of right gluteal region 05/20/2019   • Left hand weakness 05/20/2019   • Urinary  incontinence 02/19/2019   • ([facility]) Tibial artery disease 12/27/2018       Past Surgical History:  Past Surgical History:   Procedure Laterality Date   • CYSTOSCOPY STENT PLACEMENT Left 2/12/2018    Procedure: CYSTOSCOPY  ;  Surgeon: Rey Barry M.D.;  Location: SURGERY [facility];  Service: Urology   • URETEROSCOPY Left 2/12/2018    Procedure: URETEROSCOPY- FLEXIBLE  ;  Surgeon: Rey Barry M.D.;  Location: SURGERY [facility];  Service: Urology   • LASERTRIPSY Left 2/12/2018    Procedure: LASERTRIPSY - LITHO  ;  Surgeon: Rey Barry M.D.;  Location: SURGERY [facility];  Service: Urology   • WOUND CLOSURE GENERAL  4/3/2012    Performed by GERHARD GILBERT at SURGERY SAME DAY [facility] ORS   • ZZZ CARDIAC CATH  2009    Stents to LAD, Om   • DAGOBERTO BY LAPAROSCOPY  1998   • ANGIOPLASTY  1997    RCA followed by other stents as noted above.    • ZZZ CARDIAC CATH  1997    stent RCA   • CATARACT EXTRACTION WITH IOL      bilateral   • LITHOTRIPSY     • TONSILLECTOMY AND ADENOIDECTOMY         Allergies:  Diphenhydramine hcl; Lorazepam; Ciprofloxacin; and Spironolactone    Social History:  Social History     Socioeconomic History   • Marital status:      Spouse name: Not on file   • Number of children: Not on file   • Years of education: Not on file   • Highest education level: Not on file   Occupational History   • Not on file   Social Needs   • Financial resource strain: Not on file   • Food insecurity     Worry: Not on file     Inability: Not on file   • Transportation needs     Medical: Not on file     Non-medical: Not on file   Tobacco Use   • Smoking status: Never Smoker   • Smokeless tobacco: Never Used   • Tobacco comment: continued abstinence   Substance and Sexual Activity   • Alcohol use: No   • Drug use: No   • Sexual activity: Not Currently     Partners: Female     Comment: , one daughter, 2 grands   Lifestyle   • Physical activity     Days per week: Not on file     Minutes per  session: Not on file   • Stress: Not on file   Relationships   • Social connections     Talks on phone: Not on file     Gets together: Not on file     Attends Yarsani service: Not on file     Active member of club or organization: Not on file     Attends meetings of clubs or organizations: Not on file     Relationship status: Not on file   • Intimate partner violence     Fear of current or ex partner: Not on file     Emotionally abused: Not on file     Physically abused: Not on file     Forced sexual activity: Not on file   Other Topics Concern   • Not on file   Social History Narrative    Av is from [place], [place] and raised in [place]. He has been in [place] since 2004. 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  He has been  to Usha since 2003, they met through a mutual friend. He has a daughter (Kalina) and a step son (Jimi).       Family History:  Family History   Problem Relation Age of Onset   • Heart Disease Father         CAD   • Diabetes Father    • Cancer Mother    • Psychiatric Illness Mother         Depression   • Depression Mother    • Kidney stones Brother    • Heart Disease Brother    • Psychiatric Illness Brother         Depression   • Depression Brother    • Suicide Attempts Other    • Psychiatric Illness Other         autism       Medications:    Current Outpatient Medications:   •  potassium chloride (KLOR-CON) 8 MEQ tablet, TAKE 1 TABLET BY MOUTH EVERY DAY, Disp: 90 Tab, Rfl: 3  •  fluoxetine (PROZAC) 40 MG capsule, TAKE 1 CAPSULE BY MOUTH EVERY DAY (Patient taking differently: Take 40 mg by mouth every day.), Disp: 90 Cap, Rfl: 3  •  allopurinol (ZYLOPRIM) 100 MG Tab, Take 1 Tab by mouth every day for 100 days., Disp: 100 Tab, Rfl: 3  •  ONE TOUCH  ULTRA TEST strip, USE TO TEST FIVE TIMES DAILY, Disp: 600 Strip, Rfl: 1  •  FLUoxetine (PROZAC) 20 MG Cap, Take 1 Cap by mouth every day., Disp: 30 Cap, Rfl: 1  •  aspirin EC (ECOTRIN) 81 MG Tablet Delayed Response, Take 81 mg by mouth every day., Disp: , Rfl:   •  Probiotic Product (PROBIOTIC DAILY PO), Take 1 Cap by mouth 2 Times a Day., Disp: , Rfl:   •  clopidogrel (PLAVIX) 75 MG Tab, Take 75 mg by mouth every day., Disp: , Rfl:   •  magnesium oxide (MAG-OX) 400 MG Tab, Take 400 mg by mouth every day., Disp: , Rfl:   •  finasteride (PROSCAR) 5 MG Tab, Take 1 Tab by mouth every day., Disp: 30 Tab, Rfl: 0  •  atorvastatin (LIPITOR) 80 MG tablet, Take 80 mg by mouth every evening., Disp: , Rfl:   •  fenofibrate (TRICOR) 145 MG Tab, Take 145 mg by mouth every evening., Disp: , Rfl:   •  alfuzosin (UROXATRAL) 10 MG SR tablet, Take 10 mg by mouth every day., Disp: , Rfl:   •  methenamine hip (HIPPREX) 1 GM Tab, Take 1 g by mouth 2 times a day., Disp: , Rfl:   •  losartan (COZAAR) 50 MG Tab, Take 1 Tab by mouth every day. (Patient taking differently: Take 100 mg by mouth every day.), Disp: 90 Tab, Rfl: 3  •  Dulaglutide (TRULICITY) 1.5 MG/0.5ML Solution Pen-injector, Inject 1.5 mg as instructed every 7 days. On Tue, Disp: 12 PEN, Rfl: 3  •  Insulin Glargine (TOUJEO MAX SOLOSTAR) 300 UNIT/ML Solution Pen-injector, Inject 32 Units as instructed every bedtime., Disp: , Rfl:   •  insulin lispro, Human, (HUMALOG KWIKPEN) 100 UNIT/ML Solution Pen-injector injection, Inject 5-9 Units as instructed 3 times a day before meals. Sliding Scale, Disp: , Rfl:   •  metoprolol (TOPROL-XL) 200 MG XL tablet, Take 1 Tab by mouth every evening. (Patient taking differently: Take 100 mg by mouth every evening.), Disp: 90 Tab, Rfl: 3  •  acetaminophen (TYLENOL) 500 MG Tab, Take 1,000 mg by mouth every 6 hours as needed for Mild Pain or Moderate Pain., Disp: , Rfl:   •  Cholecalciferol (VITAMIN D) 2000 units Cap, Take 4,000 Units by mouth 2  Times a Day., Disp: , Rfl:     Labs: Reviewed    Physical Examination:  Vital signs: There were no vitals taken for this visit. There is no height or weight on file to calculate BMI. Patient's body mass index is unknown because there is no height or weight on file. Exercise and nutrition counseling were performed at this visit.  Doing physical therapy and moving daily.  General: No apparent distress, cooperative  Eyes: No scleral icterus or discharge  ENMT: Normal on external inspection of nose, lips, normal thyroid on inspection  Neck: No abnormal masses on inspection  Resp: Normal effort  Extremities: No visible edema  Neuro: Alert and oriented  Skin: No visible rash  Psych: Normal mood and affect, intact memory and able to make informed decisions    Assessment and Plan:    1. Uncontrolled type 2 diabetes mellitus with hyperglycemia   Continue current regimen.     - Discussed diabetic diet discussed in detail-plate method.  - He will test before meals and log.  - He will walk for 20-30 minutes daily.  - Discussed importance of immunizations and yearly eye exams. Saw Dr. Nichole and is rescheduled to see again soon.  - Advised daily foot exams. Educated on signs of infection.   - Educated on need to stay well hydrated with water.  - Educated to call with any questions or problems.    2. Essential hypertension  Controlled.     3. Vitamin D deficiency  Cont vit d     4. Mixed hyperlipidemia  Continue statin.     Return in about 3 months (around 7/13/2020).    Thank you for allowing me to participate in the care of this patient.    Dr. Fran Rasheed      CC:   Madison Bunch D.O.    This note was created using voice recognition software (Dragon). The accuracy of the dictation is limited by the abilities of the software. I have reviewed the note prior to signing, however some errors in grammar and context are still possible. If you have any questions related to this note please do not hesitate to contact  our office.

## 2020-04-13 ENCOUNTER — TELEMEDICINE (OUTPATIENT)
Dept: ENDOCRINOLOGY | Facility: MEDICAL CENTER | Age: 75
End: 2020-04-13
Payer: MEDICARE

## 2020-04-13 DIAGNOSIS — I10 ESSENTIAL HYPERTENSION: ICD-10-CM

## 2020-04-13 DIAGNOSIS — E11.65 UNCONTROLLED TYPE 2 DIABETES MELLITUS WITH HYPERGLYCEMIA (HCC): ICD-10-CM

## 2020-04-13 DIAGNOSIS — E55.9 VITAMIN D DEFICIENCY: ICD-10-CM

## 2020-04-13 DIAGNOSIS — E78.2 MIXED HYPERLIPIDEMIA: ICD-10-CM

## 2020-04-13 PROCEDURE — 99214 OFFICE O/P EST MOD 30 MIN: CPT | Mod: 95,CR | Performed by: INTERNAL MEDICINE

## 2020-04-21 ENCOUNTER — APPOINTMENT (OUTPATIENT)
Dept: MEDICAL GROUP | Facility: MEDICAL CENTER | Age: 75
End: 2020-04-21
Payer: MEDICARE

## 2020-04-29 ENCOUNTER — PATIENT MESSAGE (OUTPATIENT)
Dept: CARDIOLOGY | Facility: MEDICAL CENTER | Age: 75
End: 2020-04-29

## 2020-05-06 ENCOUNTER — TELEMEDICINE (OUTPATIENT)
Dept: CARDIOLOGY | Facility: MEDICAL CENTER | Age: 75
End: 2020-05-06
Payer: MEDICARE

## 2020-05-06 VITALS
SYSTOLIC BLOOD PRESSURE: 143 MMHG | BODY MASS INDEX: 27.09 KG/M2 | HEART RATE: 62 BPM | WEIGHT: 200 LBS | HEIGHT: 72 IN | DIASTOLIC BLOOD PRESSURE: 83 MMHG

## 2020-05-06 DIAGNOSIS — I25.10 CORONARY ARTERY DISEASE INVOLVING NATIVE CORONARY ARTERY OF NATIVE HEART WITHOUT ANGINA PECTORIS: ICD-10-CM

## 2020-05-06 DIAGNOSIS — Z86.73 HISTORY OF CEREBROVASCULAR ACCIDENT (CVA) IN ADULTHOOD: ICD-10-CM

## 2020-05-06 DIAGNOSIS — E11.69 TYPE 2 DIABETES MELLITUS WITH OTHER SPECIFIED COMPLICATION, WITH LONG-TERM CURRENT USE OF INSULIN (HCC): ICD-10-CM

## 2020-05-06 DIAGNOSIS — Z79.4 TYPE 2 DIABETES MELLITUS WITH OTHER SPECIFIED COMPLICATION, WITH LONG-TERM CURRENT USE OF INSULIN (HCC): ICD-10-CM

## 2020-05-06 DIAGNOSIS — I10 ESSENTIAL HYPERTENSION, BENIGN: ICD-10-CM

## 2020-05-06 DIAGNOSIS — I73.9 PVD (PERIPHERAL VASCULAR DISEASE) (HCC): ICD-10-CM

## 2020-05-06 DIAGNOSIS — N18.30 STAGE 3 CHRONIC KIDNEY DISEASE: ICD-10-CM

## 2020-05-06 PROCEDURE — 99215 OFFICE O/P EST HI 40 MIN: CPT | Mod: 95,CR | Performed by: INTERNAL MEDICINE

## 2020-05-06 ASSESSMENT — FIBROSIS 4 INDEX: FIB4 SCORE: 2.74

## 2020-05-06 ASSESSMENT — ENCOUNTER SYMPTOMS: BRUISES/BLEEDS EASILY: 1

## 2020-05-06 NOTE — PATIENT INSTRUCTIONS
Please get a fasting lipid panel with your next lab tests.    Please let me know if within 1 week your blood pressure is consistently >130/85.

## 2020-05-06 NOTE — PROGRESS NOTES
Cardiology Telemedicine Follow-up Consultation Note    Date of note:    5/6/2020  Primary Care Provider: Mitchell Pantoja M.D.  Referring Provider: Leonardo.     Patient Name: Av Bob   YOB: 1945  MRN:              [id number]    Chief Complaint: CAD    History of Present Illness: Av Bob is a 75 y.o. male whose current medical problems include CKD stage III, hypertension, CAD  (GIOVANNA to RCA in '97, GIOVANNA X2 to LAD and GIOVANNA X to OM in '09), atrial fibrillation, diabetes, dyslipidemia, gout, CVA 12/2016,  non-hodgkins lymphoma c/b brain lesions with resultant left hemiparesis s/p chemotherapy and depression who is here for follow-up.     At our visit, 4/3/2018:  In terms of his CVA, this was in 2016, and while he was on aspirin and plavix at [facility].  This was in the setting of active lymphoma.    He was also admitted to [facility] again in 5/2017 for sepsis and was noted to have atrial fibrillation at this time. He has no noted recurrence and no episodes before that.  He has never been on anticoagulation for Cva prevention.     In terms of his NHL, he sees Dr. Noel, and his last PET scan showed he was cancer free.     Right leg wounds, currently healing. Evaluated by Dr. Terry for bilateral tibial artery stenosis, and decided against surgery at this time.     At our visit, 10/9/2018:  Started xarelto and had episodes of melena.  Switched back to plavix.  FOBT was negative.     At our visit, 4/16/2019:  Admitted 11/21/2018 for weakness, negative cerebrovascular work-up.     At our visit, 10/8/2019:  In terms of hypertension, well controlled. Sometimes 90s/60s. Asymptomatic.     In terms of cancer, no e/o recurrence.     Still works with  an hour three times a week, no symptoms.    Was admitted for UTI this summer.     Did have peripheral angiogram, noted stenosis but no intervention performed. At [facility].     Interim  Events:  Cholesterol was poorly controlled, I increased his lipitor.     Hospitalized 1/21/2020 for hematuria and UTI requiring bladder irrigation.     Hypertension poorly controlled recently. Did just increase losartan to 50mg PO bid. Readings still in the 150s/90s. No changes in salt or diet.      + leg cramping still. And neuropathy.     In terms of Cad, no angina.     In terms of PSVT, no palpitations.     Fatigued possibly improved on lower dose of metoprolol.         Review of Systems   Hematologic/Lymphatic: Bruises/bleeds easily.   Constitution: Negative for chills, fever and night sweats.   HENT: Negative for nosebleeds.    Eyes: Negative for vision loss in left eye and vision loss in right eye.   Respiratory: Negative for hemoptysis.    Gastrointestinal: Negative for hematemesis, hematochezia and melena.   Genitourinary: Negative for hematuria.   Neurological: Negative for new focal weakness, numbness and paresthesias.     All other systems reviewed and are negative.         Past Medical History:   Diagnosis Date   • ([facility]) Chronic ulcer of right lower extremity with fat layer exposed 12/27/2018   • Acute on chronic renal failure ([facility]) 1/21/2020   • Acute respiratory failure with hypoxia ([facility]) 5/20/2017   • CAD (coronary artery disease)     GIOVANNA to RCA in '97, GIOVANNA X2 to LAD and GIOVANNA X2 to OM in '09   • Cancer ([facility])     2017; chemo lympoma   • Cataract    • Cerebrovascular accident (CVA) ([facility]) 12/30/2016    Left arm weakness  etiology of stroke not established, lymphoma discovered on MRI evaluation of stroke, L hemiparesis much worse after acute infectious illness in mid 2017, but no specific diagnosed recurrent neurological etiology, all at [facility]   • CKD (chronic kidney disease) stage 3, GFR 30-59 ml/min ([facility])    • Controlled gout 2014   • Coronary atherosclerosis of native coronary artery     S/P PTCA (percutaneous transluminal coronary angioplasty), RCA, 5/1997, patent  on cath 7/10/2009 at the time of interventions on his left anterior descending and circumflex coronary arteries   • Depression    • Diabetes ([facility])    • Enterococcal septicemia ([facility]) 8/12/2017   • Roberto hematuria 1/29/2020   • Hypertension    • Hypokalemia 2012    controlled with combination of ACE inhibitor or ARB plus spironolactone   • Hypomagnesemia 08/12/2017    etiology uncertain   • Influenza A 1/26/2020   • Leg edema, right 1/22/2020   • Lymphoma ([facility]) 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 Barry   • Normocytic hypochromic anemia 5/20/2017   • NSTEMI (non-ST elevated myocardial infarction) ([facility]) 07/18/2017    complicating UTI with sepsis   • Pain    • Polyneuropathy in diabetes(357.2) 9/11/2013   • Renal mass 1/21/2020   • Septic shock ([facility]) 5/20/2017   • Skin ulcer of calf ([facility]) 2015    Right, Dr. Terry and wound care   • Stroke ([facility]) 2016    left sided weakness   • Urinary bladder disorder    • Urinary incontinence    • Wound of left leg 2012    Requiring surgery and debridment, Dr. Moore         Past Surgical History:   Procedure Laterality Date   • CYSTOSCOPY STENT PLACEMENT Left 2/12/2018    Procedure: CYSTOSCOPY  ;  Surgeon: Rey Barry M.D.;  Location: SURGERY [facility];  Service: Urology   • URETEROSCOPY Left 2/12/2018    Procedure: URETEROSCOPY- FLEXIBLE  ;  Surgeon: Rey Barry M.D.;  Location: SURGERY [facility];  Service: Urology   • LASERTRIPSY Left 2/12/2018    Procedure: LASERTRIPSY - LITHO  ;  Surgeon: Rey Barry M.D.;  Location: SURGERY [facility];  Service: Urology   • WOUND CLOSURE GENERAL  4/3/2012    Performed by GERHARD MOORE at SURGERY SAME DAY [facility]S   • [facility] CARDIAC CATH  2009    Stents to LAD, Om   • DAGOBERTO BY LAPAROSCOPY  1998   • ANGIOPLASTY  1997    RCA followed by other stents as noted above.    • Z CARDIAC CATH  1997    stent RCA   • CATARACT EXTRACTION WITH IOL       bilateral   • LITHOTRIPSY     • TONSILLECTOMY AND ADENOIDECTOMY           Current Outpatient Medications   Medication Sig Dispense Refill   • Multiple Vitamin (MULTI VITAMIN MENS PO) Take  by mouth.     • potassium chloride (KLOR-CON) 8 MEQ tablet TAKE 1 TABLET BY MOUTH EVERY DAY 90 Tab 3   • fluoxetine (PROZAC) 40 MG capsule TAKE 1 CAPSULE BY MOUTH EVERY DAY (Patient taking differently: Take 40 mg by mouth every day.) 90 Cap 3   • allopurinol (ZYLOPRIM) 100 MG Tab Take 1 Tab by mouth every day for 100 days. 100 Tab 3   • aspirin EC (ECOTRIN) 81 MG Tablet Delayed Response Take 81 mg by mouth every day.     • Probiotic Product (PROBIOTIC DAILY PO) Take 1 Cap by mouth 2 Times a Day.     • clopidogrel (PLAVIX) 75 MG Tab Take 75 mg by mouth every day.     • magnesium oxide (MAG-OX) 400 MG Tab Take 400 mg by mouth every day.     • finasteride (PROSCAR) 5 MG Tab Take 1 Tab by mouth every day. 30 Tab 0   • atorvastatin (LIPITOR) 80 MG tablet Take 80 mg by mouth every evening.     • fenofibrate (TRICOR) 145 MG Tab Take 145 mg by mouth every evening.     • alfuzosin (UROXATRAL) 10 MG SR tablet Take 10 mg by mouth every day.     • methenamine hip (HIPPREX) 1 GM Tab Take 1 g by mouth 2 times a day.     • losartan (COZAAR) 50 MG Tab Take 1 Tab by mouth every day. (Patient taking differently: Take 100 mg by mouth every day.) 90 Tab 3   • Dulaglutide (TRULICITY) 1.5 MG/0.5ML Solution Pen-injector Inject 1.5 mg as instructed every 7 days. On Tue 12 PEN 3   • Insulin Glargine (TOUJEO MAX SOLOSTAR) 300 UNIT/ML Solution Pen-injector Inject 32 Units as instructed every bedtime.     • insulin lispro, Human, (HUMALOG KWIKPEN) 100 UNIT/ML Solution Pen-injector injection Inject 5-9 Units as instructed 3 times a day before meals. Sliding Scale     • metoprolol (TOPROL-XL) 200 MG XL tablet Take 1 Tab by mouth every evening. (Patient taking differently: Take 100 mg by mouth every evening.) 90 Tab 3   • acetaminophen (TYLENOL) 500 MG Tab  Take 1,000 mg by mouth every 6 hours as needed for Mild Pain or Moderate Pain.     • Cholecalciferol (VITAMIN D) 2000 units Cap Take 4,000 Units by mouth 2 Times a Day.     • ONE TOUCH ULTRA TEST strip USE TO TEST FIVE TIMES DAILY 600 Strip 1     No current facility-administered medications for this visit.          Allergies   Allergen Reactions   • Diphenhydramine Hcl Anxiety     Pt is able to tolerate  Mg benadryl with less anxiety   • Lorazepam Unspecified     Disorientation   • Ciprofloxacin      Rash,stomach ache   • Spironolactone      Acute kidney injury         Family History   Problem Relation Age of Onset   • Heart Disease Father         CAD   • Diabetes Father    • Cancer Mother    • Psychiatric Illness Mother         Depression   • Depression Mother    • Kidney stones Brother    • Heart Disease Brother    • Psychiatric Illness Brother         Depression   • Depression Brother    • Suicide Attempts Other    • Psychiatric Illness Other         autism         Social History     Socioeconomic History   • Marital status:      Spouse name: Not on file   • Number of children: Not on file   • Years of education: Not on file   • Highest education level: Not on file   Occupational History   • Not on file   Social Needs   • Financial resource strain: Not on file   • Food insecurity     Worry: Not on file     Inability: Not on file   • Transportation needs     Medical: Not on file     Non-medical: Not on file   Tobacco Use   • Smoking status: Never Smoker   • Smokeless tobacco: Never Used   • Tobacco comment: continued abstinence   Substance and Sexual Activity   • Alcohol use: No   • Drug use: No   • Sexual activity: Not Currently     Partners: Female     Comment: , one daughter, 2 grands   Lifestyle   • Physical activity     Days per week: Not on file     Minutes per session: Not on file   • Stress: Not on file   Relationships   • Social connections     Talks on phone: Not on file     Gets together:  Not on file     Attends Anglican service: Not on file     Active member of club or organization: Not on file     Attends meetings of clubs or organizations: Not on file     Relationship status: Not on file   • Intimate partner violence     Fear of current or ex partner: Not on file     Emotionally abused: Not on file     Physically abused: Not on file     Forced sexual activity: Not on file   Other Topics Concern   • Not on file   Social History Narrative    Av is from [place], [place] and raised in [place]. He has been in [place] since 2004. 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  He has been  to Usha since 2003, they met through a mutual friend. He has a daughter (Kalina) and a step son (Jimi).          Physical Exam:  Ambulatory Vitals  /83 (BP Location: Right arm, Patient Position: Sitting, BP Cuff Size: Adult)   Pulse 62   Ht 1.829 m (6')   Wt 90.7 kg (200 lb)    Oxygen Therapy:     BP Readings from Last 4 Encounters:   05/06/20 143/83   02/06/20 120/74   02/04/20 118/64   01/26/20 118/75       Weight/BMI: Body mass index is 27.12 kg/m².  Wt Readings from Last 4 Encounters:   05/06/20 90.7 kg (200 lb)   02/06/20 94.3 kg (208 lb)   02/04/20 90.7 kg (200 lb)   01/26/20 89.5 kg (197 lb 6.8 oz)       General: No apparent distress. Pale.   Eyes: nl conjunctiva  Neck: JVP appeared normal from afar  Lungs: normal respiratory effort  Heart: RRR per patient  Abdomen: non distended  Neurological: ZHANG  Psychiatric: Appropriate affect, A/O x 3, intact judgement and insight  Skin: no visible rashes      Lab Data Review:  Lab Results   Component Value Date/Time    CHOLSTRLTOT 160 10/17/2019 12:22 PM     (H) 10/17/2019 12:22 PM    HDL 33 (A) 10/17/2019 12:22 PM     TRIGLYCERIDE 114 10/17/2019 12:22 PM       Lab Results   Component Value Date/Time    SODIUM 137 02/03/2020 02:16 PM    POTASSIUM 4.3 02/03/2020 02:16 PM    CHLORIDE 103 02/03/2020 02:16 PM    CO2 28 02/03/2020 02:16 PM    GLUCOSE 122 (H) 02/03/2020 02:16 PM    BUN 20 02/03/2020 02:16 PM    CREATININE 1.43 (H) 02/03/2020 02:16 PM    CREATININE 1.4 05/28/2008 05:42 PM    BUNCREATRAT 10 03/27/2017 02:11 PM     Lab Results   Component Value Date/Time    ALKPHOSPHAT 91 01/26/2020 04:39 PM    ASTSGOT 59 (H) 01/26/2020 04:39 PM    ALTSGPT 33 01/26/2020 04:39 PM    TBILIRUBIN 0.4 01/26/2020 04:39 PM      Lab Results   Component Value Date/Time    WBC 8.6 01/26/2020 04:39 PM     No components found for: HBGA1C  No components found for: TROPONIN  No components found for: BNP      Cardiac Imaging and Procedures Review:    EKG dated 1/26/2020: personally reviewed and showed NSR, artifact, IVCD, late precordial R/S transitoin and prolonged QTc interval.     Echo dated 11/21/2018:  CONCLUSIONS  Prior echocardiogram 10/5/2017 - no noted changes.  The effusion is   new.  Left ventricular ejection fraction is visually estimated to be 60%.  Normal inferior vena cava size and inspiratory collapse.  Trivial to small pericardial effusion noted without evidence of   hemodynamic compromise.  No significant valve disease or flow abnormalities.     Holter, 5/30/2018:    CONCLUSIONS:  * paroxysmal supraventricular tachycardia.  15 episodes over 13 days.  Longest episode 17 seconds  * No atrial fibrillation  * No significant ectopy  * No arrhythmias or ectopy during symptoms      Nuclear Perfusion Imaging (1/2018):   Myocardial Perfusion   Report   NUCLEAR IMAGING INTERPRETATION   The LV is mildly dilated with global hypokinesis.  Calculated LV EF is 49%.     There is a small region of decreased perfusion in the inferior-lateral wall    with a mild amount of inducible ischemia.   ECG INTERPRETATION   Negative stress ECG for  ischemia.      Stress test 6/2017 ([facility])    Findings:   Small in size moderate in intensity fixed apical defect. Computer analysis also suggests mild in intensity small in size diminished counts along inferior wall of left ventricle which appears to improve at time of rest. Summed stress score is   7. Summed rest score is 1. Gross hypomotility of the left ventricle is noted with disordered contraction evident. Global hypokinesis is noted.      [facility] (2009): at [facility], last stent.     Radiology test Review:  CXR:1/26/2020:  IMPRESSION:     Stable chest without acute/new abnormality.    Vascular US 2/2018:  Vascular Laboratory   Conclusions   There is no evidence of arterial disease demonstrated (PADMINI is .9-1.0).    Absent flow within the Left VIVIENNE.     Vascular Laboratory   CONCLUSIONS   Right Lower Extremity-   Normal flow from the common femoral artery extending distally to the    popliteal artery. Flow within the posterior tibial artery is brisk and    multiphasic.   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     Left Lower Extremity-   Normal flow seen from the common femoral artery extending distally to the    popliteal artery.   Area of elevated velocities seen within the mid segment of the posterior    tibial artery. Consistent with >50% stenosis.   Occlusion of the anterior tibial artery at the mid-distal segment, minimal    flow reversal seen at the level of the ankle.      MRI 12/31/2016  7 mm acute/early subacute infarct is present in the right anterolateral medulla.  There is no significant mass effect or hemorrhage.  No other recent infarction.    12/31/2016:  Three sites of abnormal parenchymal enhancement are present.  *  At the previously seen right anterior pontine lesion, there is a 5 x 3 x 7 mm elongated enhancing peripheral pontine lesion.  *  There is a second 2 mm enhancing focus also in the right anterior  david.  *  There is a third 3 mm lesion at the left globus pallidus internus.    These findings may represent metastatic disease.  The elongated morphology of the anterior peripheral pontine lesion is somewhat atypical for metastatic lesion, and could potentially represent enhancement of a subacute infarct.  Two other lesions could also, in theory, represent subacute enhancing reperfusion of lacunar infarctions. Anecdotally, this would be an unusual finding in lacunar infarctions. Note that post infarct enhancement typically occurs approximately 1 week after infarction and resolves within 12 weeks or less.       Head CTA 2016:  1. There are plaque formations identified in both carotid bifurcations   (right greater than left). This causes approximately 50% stenosis on the   right and 10-20% stenosis on the left in the proximal internal carotid   arteries. The remainder of the carotid   vasculature is unremarkable.  2. The vertebral arteries are within normal limits.  3. No dissection.  4. A 5.5 mm left thyroid lobe cyst/nodule is present.  5. A 10 x 13 mm partially calcified pulmonary nodule is present in the   left upper lobe. There may be some internal fat density. Findings could   represent a benign pulmonary hamartoma. Recommend clinical correlation.  6. Moderate to advanced degenerative changes are present throughout the   cervical spine.      Medical Decision Makin. Atherosclerosis of native coronary artery of native heart without angina pectoris  S/p 5 stents, last in . Mild ischemia on two recent nuclear perfusion scans    Currently without recurrent chest pain (had some atypical chest pain 2017) so will treat medically.  -continue aspirin/lipitor  -fasting blood tests ordered for next labs, lipid panel.       2. 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 Essential hypertension, benign  Now poorly controlled.   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   -Continue losartan 50mg PO bid  -if BP remains >130/85 in 1 week, would start norvasc 5mg PO daily    4. Paroxysmal atrial fibrillation (CMS-HCC)  Self limited and one time in setting of sepsis. Previously discussed at length risks of recurrent CVA given his previous likely embolic event, and if he would like an ILR to confirm diagnosis or to start empiric anticoagulation.  He prefers the later and we started xarelto 4/2018.  Unfortunately he had melena almost immediately, and was switched back to aspirin/plavix.  He does not want to retrial anticoagulation, or a GI referral as he sees enough physicians already.   -continue aspirin/plavix. No anticoag for now given patient preference despite risk of recurrent CVA.    -continue metoprolol XL 100mg PO Daily, dose decreased from prior due to fatigue.     5. CKD (chronic kidney disease) stage 3, GFR 30-59 ml/min  Stable.  -avoid spironolactone  -continue losartan  -followed by dr. Jarvis.     6. Diffuse large cell non-Hodgkin's lymphoma (CMS-HCC)  Followed by Dr. Noel, in remission per report with recent negative PET scan.    7. Left hemiparesis (CMS-HCC)  Acute 12/2016 at [facility].  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  Symptoms originally improved significantly with chemotherapy and he was walking well, and then hemiparesis worsened significantly after sepsis from UTI and norovirus infection mid 2017.  Currently continuing physical therapy, but remains in a wheelchair.     8. Cerebrovascular accident (CVA) due to embolism of cerebral artery (CMS-HCC)  In addition to cerebral enhancing lesions thought to be lymphoma on MRI 12/2016, he was found to have a subacute infarct in the right anterolateral medulla.    -aspirin/plavix for CVA  prevention    9. Dyslipidemia  Lipitor, fenofibrate. Recheck lipids with next labs.     10. Peripheral vascular disease (CMS-HCC)  Followed by Dr. Terry previously but now followed by Dr. Mohsen at [facility]. No recent intervention. Contributing to non-healing wounds.   -continue aspirin/plavix     11. Goals of care - this is completed, they have scanned in a card with the number to cloud access to their documents.       This encounter was conducted via Zoom .   Verbal consent was obtained. Patient's identity was verified.    Patient was viewed virtually via secure and encrypted videoconferencing technology.       Return in about 6 months (around 11/6/2020).       Osvaldo Tucker MD  [facility]  [facility], [address]  [address] [address]  Phone: [phone]  Fax: [phone]

## 2020-05-07 ENCOUNTER — HOSPITAL ENCOUNTER (OUTPATIENT)
Dept: LAB | Facility: MEDICAL CENTER | Age: 75
End: 2020-05-07
Attending: INTERNAL MEDICINE
Payer: MEDICARE

## 2020-05-07 DIAGNOSIS — Z86.73 HISTORY OF CEREBROVASCULAR ACCIDENT (CVA) IN ADULTHOOD: ICD-10-CM

## 2020-05-07 DIAGNOSIS — I25.10 CORONARY ARTERY DISEASE INVOLVING NATIVE CORONARY ARTERY OF NATIVE HEART WITHOUT ANGINA PECTORIS: ICD-10-CM

## 2020-05-07 DIAGNOSIS — D64.9 ANEMIA, UNSPECIFIED TYPE: ICD-10-CM

## 2020-05-07 DIAGNOSIS — E55.9 VITAMIN D DEFICIENCY: ICD-10-CM

## 2020-05-07 DIAGNOSIS — N18.30 CKD (CHRONIC KIDNEY DISEASE), STAGE III: ICD-10-CM

## 2020-05-07 LAB
25(OH)D3 SERPL-MCNC: 78 NG/ML (ref 30–100)
ANION GAP SERPL CALC-SCNC: 11 MMOL/L (ref 7–16)
BUN SERPL-MCNC: 22 MG/DL (ref 8–22)
CALCIUM SERPL-MCNC: 8.9 MG/DL (ref 8.5–10.5)
CHLORIDE SERPL-SCNC: 103 MMOL/L (ref 96–112)
CHOLEST SERPL-MCNC: 120 MG/DL (ref 100–199)
CO2 SERPL-SCNC: 25 MMOL/L (ref 20–33)
CREAT SERPL-MCNC: 1.45 MG/DL (ref 0.5–1.4)
ERYTHROCYTE [DISTWIDTH] IN BLOOD BY AUTOMATED COUNT: 48.2 FL (ref 35.9–50)
FASTING STATUS PATIENT QL REPORTED: NORMAL
GLUCOSE SERPL-MCNC: 54 MG/DL (ref 65–99)
HCT VFR BLD AUTO: 41.6 % (ref 42–52)
HDLC SERPL-MCNC: 30 MG/DL
HGB BLD-MCNC: 13.3 G/DL (ref 14–18)
LDLC SERPL CALC-MCNC: 73 MG/DL
MCH RBC QN AUTO: 27.8 PG (ref 27–33)
MCHC RBC AUTO-ENTMCNC: 32 G/DL (ref 33.7–35.3)
MCV RBC AUTO: 86.8 FL (ref 81.4–97.8)
PLATELET # BLD AUTO: 313 K/UL (ref 164–446)
PMV BLD AUTO: 10.3 FL (ref 9–12.9)
POTASSIUM SERPL-SCNC: 3.9 MMOL/L (ref 3.6–5.5)
PTH-INTACT SERPL-MCNC: 23.8 PG/ML (ref 14–72)
RBC # BLD AUTO: 4.79 M/UL (ref 4.7–6.1)
SODIUM SERPL-SCNC: 139 MMOL/L (ref 135–145)
TRIGL SERPL-MCNC: 84 MG/DL (ref 0–149)
WBC # BLD AUTO: 11.1 K/UL (ref 4.8–10.8)

## 2020-05-07 PROCEDURE — 82306 VITAMIN D 25 HYDROXY: CPT

## 2020-05-07 PROCEDURE — 83970 ASSAY OF PARATHORMONE: CPT

## 2020-05-07 PROCEDURE — 80061 LIPID PANEL: CPT

## 2020-05-07 PROCEDURE — 80048 BASIC METABOLIC PNL TOTAL CA: CPT

## 2020-05-07 PROCEDURE — 36415 COLL VENOUS BLD VENIPUNCTURE: CPT

## 2020-05-07 PROCEDURE — 85027 COMPLETE CBC AUTOMATED: CPT

## 2020-05-12 DIAGNOSIS — E11.65 UNCONTROLLED TYPE 2 DIABETES MELLITUS WITH HYPERGLYCEMIA (HCC): ICD-10-CM

## 2020-05-12 NOTE — TELEPHONE ENCOUNTER
Received request via: Patient    Was the patient seen in the last year in this department? Yes    Does the patient have an active prescription (recently filled or refills available) for medication(s) requested? No     TOUJEO SOLOSTAR 300 UNIT/ML Solution Pen-injector

## 2020-05-13 DIAGNOSIS — I10 ESSENTIAL HYPERTENSION, BENIGN: ICD-10-CM

## 2020-05-13 RX ORDER — INSULIN GLARGINE 300 U/ML
INJECTION, SOLUTION SUBCUTANEOUS
Qty: 45 ML | Refills: 3 | Status: SHIPPED | OUTPATIENT
Start: 2020-05-13 | End: 2020-07-06

## 2020-05-13 RX ORDER — AMLODIPINE BESYLATE 5 MG/1
5 TABLET ORAL DAILY
Qty: 90 TAB | Refills: 3 | Status: SHIPPED | OUTPATIENT
Start: 2020-05-13 | End: 2021-04-29

## 2020-05-13 NOTE — PROGRESS NOTES
Patient's wife sent [employer] message reporting elevated blood pressure.  Per chart note from last follow-up visit:    3. Essential hypertension, benign  Now poorly controlled.   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   -Continue losartan 50mg PO bid  -if BP remains >130/85 in 1 week, would start norvasc 5mg PO daily    Amlodipine Rx sent to pharmacy.  Patient updated via [employer].

## 2020-05-14 ENCOUNTER — APPOINTMENT (OUTPATIENT)
Dept: NEPHROLOGY | Facility: MEDICAL CENTER | Age: 75
End: 2020-05-14
Payer: MEDICARE

## 2020-05-14 ENCOUNTER — HOSPITAL ENCOUNTER (OUTPATIENT)
Dept: LAB | Facility: MEDICAL CENTER | Age: 75
End: 2020-05-14
Attending: INTERNAL MEDICINE
Payer: MEDICARE

## 2020-05-14 DIAGNOSIS — D72.829 LEUKOCYTOSIS, UNSPECIFIED TYPE: ICD-10-CM

## 2020-05-14 LAB
APPEARANCE UR: ABNORMAL
BACTERIA #/AREA URNS HPF: ABNORMAL /HPF
BILIRUB UR QL STRIP.AUTO: NEGATIVE
COLOR UR: YELLOW
EPI CELLS #/AREA URNS HPF: ABNORMAL /HPF
GLUCOSE UR STRIP.AUTO-MCNC: NEGATIVE MG/DL
HYALINE CASTS #/AREA URNS LPF: ABNORMAL /LPF
KETONES UR STRIP.AUTO-MCNC: NEGATIVE MG/DL
LEUKOCYTE ESTERASE UR QL STRIP.AUTO: ABNORMAL
MICRO URNS: ABNORMAL
NITRITE UR QL STRIP.AUTO: POSITIVE
PH UR STRIP.AUTO: 7 [PH] (ref 5–8)
PROT UR QL STRIP: 30 MG/DL
RBC # URNS HPF: ABNORMAL /HPF
RBC UR QL AUTO: ABNORMAL
SP GR UR STRIP.AUTO: 1.01
UROBILINOGEN UR STRIP.AUTO-MCNC: 0.2 MG/DL
WBC #/AREA URNS HPF: ABNORMAL /HPF

## 2020-05-14 PROCEDURE — 87086 URINE CULTURE/COLONY COUNT: CPT

## 2020-05-14 PROCEDURE — 87186 SC STD MICRODIL/AGAR DIL: CPT

## 2020-05-14 PROCEDURE — 81001 URINALYSIS AUTO W/SCOPE: CPT

## 2020-05-14 PROCEDURE — 87077 CULTURE AEROBIC IDENTIFY: CPT

## 2020-05-17 NOTE — RESULT ENCOUNTER NOTE
Good Afternoon,    I have the final results of the urine study, again demonstrates Klebsiella which has been the primary bacteria for the past several months. I was reading through urology notes and they had mentioned at one point considering a trial of suppressive antibiotics to try and minimize the recurrence of infections due to the retention issues with the bladder. Was that decided against or were you waiting until the additional ultrasound imaging was completed? I can reach out to Dr. Barry's office to see if there is anything they would recommend in the mean time, but it seems that your bladder is colonized with this bacteria and so I am not sure that 1 week of antibiotics will make much difference in the long run. Let me know what you think. The good news is that the bug has not developed much resistance so there are still several antibiotics we can use when needed, it is just challenging to tell in those with ongoing retention issues if there is a new/acute infection or if this is a bug that  now normally lives in your bladder. Let me know what you think.    Thanks,  Dr. Bunch

## 2020-05-19 DIAGNOSIS — I10 ESSENTIAL HYPERTENSION: ICD-10-CM

## 2020-05-19 RX ORDER — METOPROLOL SUCCINATE 100 MG/1
100 TABLET, EXTENDED RELEASE ORAL DAILY
Qty: 90 TAB | Refills: 3 | Status: SHIPPED | OUTPATIENT
Start: 2020-05-19 | End: 2021-05-03

## 2020-05-19 NOTE — PROGRESS NOTES
Patient sent [employer] message requesting metoprolol 100mg.  Previous order said 200mg.    Per last office visit note:    4. Paroxysmal atrial fibrillation (CMS-HCC)  Self limited and one time in setting of sepsis. Previously discussed at length risks of recurrent CVA given his previous likely embolic event, and if he would like an ILR to confirm diagnosis or to start empiric anticoagulation.  He prefers the later and we started xarelto 4/2018.  Unfortunately he had melena almost immediately, and was switched back to aspirin/plavix.  He does not want to retrial anticoagulation, or a GI referral as he sees enough physicians already.   -continue aspirin/plavix. No anticoag for now given patient preference despite risk of recurrent CVA.    -continue metoprolol XL 100mg PO Daily, dose decreased from prior due to fatigue.    Rx sent with correct dosage.  Patient notified via [employer].

## 2020-05-20 ENCOUNTER — APPOINTMENT (RX ONLY)
Dept: URBAN - METROPOLITAN AREA CLINIC 35 | Facility: CLINIC | Age: 75
Setting detail: DERMATOLOGY
End: 2020-05-20

## 2020-05-20 DIAGNOSIS — D485 NEOPLASM OF UNCERTAIN BEHAVIOR OF SKIN: ICD-10-CM

## 2020-05-20 PROBLEM — D48.5 NEOPLASM OF UNCERTAIN BEHAVIOR OF SKIN: Status: ACTIVE | Noted: 2020-05-20

## 2020-05-20 PROCEDURE — ? BIOPSY BY PUNCH METHOD

## 2020-05-20 PROCEDURE — 11104 PUNCH BX SKIN SINGLE LESION: CPT

## 2020-05-20 ASSESSMENT — LOCATION SIMPLE DESCRIPTION DERM: LOCATION SIMPLE: LEFT FOREARM

## 2020-05-20 ASSESSMENT — LOCATION ZONE DERM: LOCATION ZONE: ARM

## 2020-05-20 ASSESSMENT — LOCATION DETAILED DESCRIPTION DERM: LOCATION DETAILED: LEFT VENTRAL LATERAL PROXIMAL FOREARM

## 2020-05-20 NOTE — PROCEDURE: BIOPSY BY PUNCH METHOD
Detail Level: Detailed
Was A Bandage Applied: Yes
Punch Size In Mm: 8
Biopsy Type: H and E
Anesthesia Type: 0.5% lidocaine with 1:200,000 epinephrine and a 1:10 solution of 8.4% sodium bicarbonate
Anesthesia Volume In Cc: 0.6
Additional Anesthesia Volume In Cc (Will Not Render If 0): 0
Hemostasis: None
Epidermal Sutures: 4-0 Nylon
Number Of Epidermal Sutures (Optional): 4
Wound Care: Petrolatum
Dressing: bandage
Suture Removal: 14 days
Patient Will Remove Sutures At Home?: No
Lab: [id number]
Lab Facility: 
Consent: Written consent was obtained and risks were reviewed including but not limited to scarring, infection, bleeding, scabbing, incomplete removal, nerve damage and allergy to anesthesia.
Post-Care Instructions: I reviewed with the patient in detail post-care instructions. Patient is to keep the biopsy site dry overnight, and then change the bandage and apply petrolatum once daily until sutures are removed.  Use a clean q-tip to apply the petrolatum and avoid touching the biopsy site with your hands.  Avoid immersing in water such as bathtub or swimming pools. Any concerns about a wound infection come into the office for a walk in visit Monday through Friday 8:30 am to 12 pm or 1 pm to 4:30 pm Signs of infection include increasing pain, redness, and drainage from biopsy site.  If we are not in the office, please call through the answering service.
Home Suture Removal Text: Patient will remove their sutures at home.  If they have any questions or difficulties they will call the office.
Notification Instructions: Patient will be notified of biopsy results. However, patient instructed to call the office if not contacted within 2 weeks.
Billing Type: Third-Party Bill
Information: Selecting Yes will display possible errors in your note based on the variables you have selected. This validation is only offered as a suggestion for you. PLEASE NOTE THAT THE VALIDATION TEXT WILL BE REMOVED WHEN YOU FINALIZE YOUR NOTE. IF YOU WANT TO FAX A PRELIMINARY NOTE YOU WILL NEED TO TOGGLE THIS TO 'NO' IF YOU DO NOT WANT IT IN YOUR FAXED NOTE.

## 2020-05-27 ENCOUNTER — TELEMEDICINE (OUTPATIENT)
Dept: NEPHROLOGY | Facility: MEDICAL CENTER | Age: 75
End: 2020-05-27
Payer: MEDICARE

## 2020-05-27 VITALS — HEART RATE: 70 BPM | TEMPERATURE: 97.7 F | SYSTOLIC BLOOD PRESSURE: 132 MMHG | DIASTOLIC BLOOD PRESSURE: 91 MMHG

## 2020-05-27 DIAGNOSIS — N18.30 CKD (CHRONIC KIDNEY DISEASE), STAGE III: ICD-10-CM

## 2020-05-27 DIAGNOSIS — E11.29 MICROALBUMINURIA DUE TO TYPE 2 DIABETES MELLITUS (HCC): ICD-10-CM

## 2020-05-27 DIAGNOSIS — D64.9 ANEMIA, UNSPECIFIED TYPE: ICD-10-CM

## 2020-05-27 DIAGNOSIS — N39.0 RECURRENT UTI: ICD-10-CM

## 2020-05-27 DIAGNOSIS — R33.9 URINARY RETENTION: ICD-10-CM

## 2020-05-27 DIAGNOSIS — R80.9 MICROALBUMINURIA DUE TO TYPE 2 DIABETES MELLITUS (HCC): ICD-10-CM

## 2020-05-27 DIAGNOSIS — I10 ESSENTIAL HYPERTENSION: ICD-10-CM

## 2020-05-27 DIAGNOSIS — E55.9 VITAMIN D DEFICIENCY: ICD-10-CM

## 2020-05-27 PROCEDURE — 99212 OFFICE O/P EST SF 10 MIN: CPT | Mod: 95,CR | Performed by: INTERNAL MEDICINE

## 2020-05-27 ASSESSMENT — ENCOUNTER SYMPTOMS
SHORTNESS OF BREATH: 0
FLANK PAIN: 0
FEVER: 0
NECK PAIN: 0
MYALGIAS: 0
VOMITING: 0
CHILLS: 0
PALPITATIONS: 0
WHEEZING: 0
NAUSEA: 0
HEMOPTYSIS: 0
COUGH: 0
SINUS PAIN: 0
WEIGHT LOSS: 0
EYES NEGATIVE: 1
BACK PAIN: 1
ORTHOPNEA: 0

## 2020-05-27 NOTE — PATIENT INSTRUCTIONS
Vit D 2000 units daily -1 tablet daily  F/u with Urology  Keep well hydrated  Monitor BP  Low salt diet

## 2020-05-27 NOTE — PROGRESS NOTES
Telemedicine Note     Please note that I could not evaluate the patient in person at the site. All examination and evaluation of the patient and information gathering from the patient and/or the family were done jointly by the requesting physician, Dr. Nona tovar. providers found at the site and me via licensed and securely encrypted tele-video communication equipment with the assistance of a trained tele-presenter at the originating site.  As such, my assessments and recommendations are limited as far as such telecommnication allows.     Consulting MD: Laly Jarvis M.D.    Consulting MD location: [place], [place]  Requesting Physician: Madison Bunch DO  Patient name:      Av Bob  :                    1945   MRN:                   @  [id number]  Data source:  Patient, Spouse  Video Time:       12 minutes.   Originating site: Pt home -video on demand  This encounter was conducted via Zoom .   Verbal consent was obtained. Patient's identity was verified.  Date of Service: 2020     Chief Complaint:   Chief Complaint   Patient presents with   • Chronic Kidney Disease        History of Present Illness:  Av Bob is a 75 y.o. male who presents today for f/u of CKD III, microalbuminuria.recurrent UTI,   Doing well, no complaints  Several days ago had episodes of N/V  UA positive for UTI from Klebsiella PNA -f/u with Urology, plan for cystoscopy  Creat level stable at 1.4 -baseline  No dysuria No flank pain  (+) for urinary  incontinence  HTN: BP well controlled              ROS: (Change to below. Must have 10 or more organ system for the highest level code).   Review of Systems   Constitutional: Negative for chills, fever, malaise/fatigue and weight loss.   HENT: Negative for congestion, hearing loss and sinus pain.    Eyes: Negative.    Respiratory: Negative for cough, hemoptysis, shortness of breath and wheezing.    Cardiovascular: Negative for chest pain, palpitations, orthopnea and  leg swelling.   Gastrointestinal: Negative for nausea and vomiting.   Genitourinary: Positive for frequency. Negative for dysuria, flank pain, hematuria and urgency.   Musculoskeletal: Positive for back pain. Negative for myalgias and neck pain.   Skin: Negative.    All other systems reviewed and are negative.              Current Outpatient Medications:   •  metoprolol SR (TOPROL XL) 100 MG TABLET SR 24 HR, Take 1 Tab by mouth every day., Disp: 90 Tab, Rfl: 3  •  TOUJEO SOLOSTAR 300 UNIT/ML Solution Pen-injector, INJECT 45 UNITS AS DIRECTED EVERY NIGHT AT BEDTIME, Disp: 45 mL, Rfl: 3  •  amLODIPine (NORVASC) 5 MG Tab, Take 1 Tab by mouth every day., Disp: 90 Tab, Rfl: 3  •  Multiple Vitamin (MULTI VITAMIN MENS PO), Take  by mouth., Disp: , Rfl:   •  potassium chloride (KLOR-CON) 8 MEQ tablet, TAKE 1 TABLET BY MOUTH EVERY DAY, Disp: 90 Tab, Rfl: 3  •  fluoxetine (PROZAC) 40 MG capsule, TAKE 1 CAPSULE BY MOUTH EVERY DAY (Patient taking differently: Take 40 mg by mouth every day.), Disp: 90 Cap, Rfl: 3  •  allopurinol (ZYLOPRIM) 100 MG Tab, Take 1 Tab by mouth every day for 100 days., Disp: 100 Tab, Rfl: 3  •  ONE TOUCH ULTRA TEST strip, USE TO TEST FIVE TIMES DAILY, Disp: 600 Strip, Rfl: 1  •  aspirin EC (ECOTRIN) 81 MG Tablet Delayed Response, Take 81 mg by mouth every day., Disp: , Rfl:   •  Probiotic Product (PROBIOTIC DAILY PO), Take 1 Cap by mouth 2 Times a Day., Disp: , Rfl:   •  clopidogrel (PLAVIX) 75 MG Tab, Take 75 mg by mouth every day., Disp: , Rfl:   •  magnesium oxide (MAG-OX) 400 MG Tab, Take 400 mg by mouth every day., Disp: , Rfl:   •  finasteride (PROSCAR) 5 MG Tab, Take 1 Tab by mouth every day., Disp: 30 Tab, Rfl: 0  •  atorvastatin (LIPITOR) 80 MG tablet, Take 80 mg by mouth every evening., Disp: , Rfl:   •  fenofibrate (TRICOR) 145 MG Tab, Take 145 mg by mouth every evening., Disp: , Rfl:   •  alfuzosin (UROXATRAL) 10 MG SR tablet, Take 10 mg by mouth every day., Disp: , Rfl:   •  methenamine  hip (HIPPREX) 1 GM Tab, Take 1 g by mouth 2 times a day., Disp: , Rfl:   •  losartan (COZAAR) 50 MG Tab, Take 1 Tab by mouth every day. (Patient taking differently: Take 100 mg by mouth every day.), Disp: 90 Tab, Rfl: 3  •  Dulaglutide (TRULICITY) 1.5 MG/0.5ML Solution Pen-injector, Inject 1.5 mg as instructed every 7 days. On Tue, Disp: 12 PEN, Rfl: 3  •  Insulin Glargine (TOUJEO MAX SOLOSTAR) 300 UNIT/ML Solution Pen-injector, Inject 32 Units as instructed every bedtime., Disp: , Rfl:   •  insulin lispro, Human, (HUMALOG KWIKPEN) 100 UNIT/ML Solution Pen-injector injection, Inject 5-9 Units as instructed 3 times a day before meals. Sliding Scale, Disp: , Rfl:   •  acetaminophen (TYLENOL) 500 MG Tab, Take 1,000 mg by mouth every 6 hours as needed for Mild Pain or Moderate Pain., Disp: , Rfl:   •  Cholecalciferol (VITAMIN D) 2000 units Cap, Take 4,000 Units by mouth 2 Times a Day., Disp: , Rfl:         Past Medical History:  Past Medical History:   Diagnosis Date   • ([facility]) Chronic ulcer of right lower extremity with fat layer exposed 12/27/2018   • Acute on chronic renal failure ([facility]) 1/21/2020   • Acute respiratory failure with hypoxia ([facility]) 5/20/2017   • CAD (coronary artery disease)     GIOVANNA to RCA in '97, GIOVANNA X2 to LAD and GIOVANNA X2 to OM in '09   • Cancer ([facility])     2017; chemo lympoma   • Cataract    • Cerebrovascular accident (CVA) ([facility]) 12/30/2016    Left arm weakness  etiology of stroke not established, lymphoma discovered on MRI evaluation of stroke, L hemiparesis much worse after acute infectious illness in mid 2017, but no specific diagnosed recurrent neurological etiology, all at [facility]   • CKD (chronic kidney disease) stage 3, GFR 30-59 ml/min ([facility])    • Controlled gout 2014   • Coronary atherosclerosis of native coronary artery     S/P PTCA (percutaneous transluminal coronary angioplasty), RCA, 5/1997, patent on cath 7/10/2009 at the time of interventions on his left  anterior descending and circumflex coronary arteries   • Depression    • Diabetes ([facility])    • Enterococcal septicemia ([facility]) 8/12/2017   • Roberto hematuria 1/29/2020   • Hypertension    • Hypokalemia 2012    controlled with combination of ACE inhibitor or ARB plus spironolactone   • Hypomagnesemia 08/12/2017    etiology uncertain   • Influenza A 1/26/2020   • Leg edema, right 1/22/2020   • 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 Barry   • Normocytic hypochromic anemia 5/20/2017   • NSTEMI (non-ST elevated myocardial infarction) ([facility]) 07/18/2017    complicating UTI with sepsis   • Pain    • Polyneuropathy in diabetes(357.2) 9/11/2013   • Renal mass 1/21/2020   • Septic shock ([facility]) 5/20/2017   • Skin ulcer of calf ([facility]) 2015    Right, Dr. Trery and wound care   • Stroke ([facility]) 2016    left sided weakness   • Urinary bladder disorder    • Urinary incontinence    • Wound of left leg 2012    Requiring surgery and debridment, Dr. Moore       Allergies:   Allergies   Allergen Reactions   • Diphenhydramine Hcl Anxiety     Pt is able to tolerate  Mg benadryl with less anxiety   • Lorazepam Unspecified     Disorientation   • Ciprofloxacin      Rash,stomach ache   • Spironolactone      Acute kidney injury        Current Outpatient Medications:   (Not in a hospital admission)          Physical Exam:   Vitals:   Vitals:    05/27/20 1611   BP: 132/91   Pulse: 70   Temp: 36.5 °C (97.7 °F)       GEN: comfortable, in NAD   HEENT: Pupils unable to check.  Hearings appears normal   NECK: appears supple, unable to check for thyroid or lymphadenopathy.  CV: unable to auscultate.   PULM: unable to auscultate.  ABD: not distended.  Ext: trace pedal edema per Pt  SKIN: No obvious rash or lesion noted.   Psychiatric: normal mood and affect, alert and oriented to self, place, persons and time.           Imaging reviewed & Visualized by  me:  DIAGNOSTIC IMAGING REVIEWED AND/OR INTERPRETED BY ME       Laboratory:   Recent Labs     07/01/19  0448  10/17/19  1222  01/07/20  1240 01/21/20  1925  01/26/20  1639 02/03/20  1416 05/07/20  1212 05/07/20  1213   ALBUMIN 2.7*   < >  --   --  3.2 3.5  --  3.2  --   --   --    HDL  --   --  33*  --   --   --   --   --   --  30*  --    TRIGLYCERIDE  --   --  114  --   --   --   --   --   --  84  --    SODIUM 141   < >  --    < > 142 142   < > 139 137  --  139   POTASSIUM 3.2*   < >  --    < > 4.4 4.0   < > 3.8 4.3  --  3.9   CHLORIDE 103   < >  --    < > 106 105   < > 104 103  --  103   CO2 26   < >  --    < > 27 24   < > 21 28  --  25   BUN 23*   < >  --    < > 25* 32*   < > 18 20  --  22   CREATININE 1.73*   < >  --    < > 1.60* 1.83*   < > 1.42* 1.43*  --  1.45*   PHOSPHORUS 2.8  --   --   --   --   --   --   --   --   --   --     < > = values in this interval not displayed.               URINALYSIS:  Lab Results   Component Value Date/Time    COLORURINE Yellow 05/14/2020 1326    CLARITY Turbid (A) 05/14/2020 1326    SPECGRAVITY 1.015 05/14/2020 1326    PHURINE 7.0 05/14/2020 1326    KETONES Negative 05/14/2020 1326    PROTEINURIN 30 (A) 05/14/2020 1326    BILIRUBINUR Negative 05/14/2020 1326    UROBILU 0.2 05/14/2020 1326    NITRITE Positive (A) 05/14/2020 1326    LEUKESTERAS Large (A) 05/14/2020 1326    OCCULTBLOOD Moderate (A) 05/14/2020 1326     UPC  No results found for: TOTPROTUR   Lab Results   Component Value Date/Time    CREATININEU 104.60 11/30/2017 1211            Assessment and Plan:        1. CKD (chronic kidney disease), stage III (HCC)      Creat level stable at baseline -to monitor    2. Essential hypertension      BP well controlled -to monitor    3. Microalbuminuria due to type 2 diabetes mellitus (HCC)      Improved -mild -on ARB    4. Recurrent UTI      F/u with Urology, plan for cystoscopy    5. Vitamin D deficiency      Vit D andPTH well controlled    6. Urinary retention      F/u with  Urology    7. Anemia, unspecified type      Hb level improved significantly -to monitor         Recs: vit D 2000 units daily, f/u with Urology              Keep well hydrated              Low salt diet              Monitor BP    RTC in 3 months    I spent total of 12 minutes on face-to-face via telemonitor more than half of which was spent coordinating this with patient as well as reviewing the images and labs results with the patient and the family present, and answering all questions and explaining in details the assessment and recommendation sections above. The patient and the family expressed their understanding and agreement to the above.        This EM service was conducted utilizing secure and encrypted videoconferencing equipment

## 2020-06-04 ENCOUNTER — APPOINTMENT (RX ONLY)
Dept: URBAN - METROPOLITAN AREA CLINIC 35 | Facility: CLINIC | Age: 75
Setting detail: DERMATOLOGY
End: 2020-06-04

## 2020-06-04 DIAGNOSIS — Z48.02 ENCOUNTER FOR REMOVAL OF SUTURES: ICD-10-CM

## 2020-06-04 PROCEDURE — ? SUTURE REMOVAL (NO GLOBAL PERIOD)

## 2020-06-04 ASSESSMENT — LOCATION SIMPLE DESCRIPTION DERM: LOCATION SIMPLE: LEFT FOREARM

## 2020-06-04 ASSESSMENT — LOCATION DETAILED DESCRIPTION DERM: LOCATION DETAILED: LEFT VENTRAL LATERAL PROXIMAL FOREARM

## 2020-06-04 ASSESSMENT — LOCATION ZONE DERM: LOCATION ZONE: ARM

## 2020-06-09 ENCOUNTER — OFFICE VISIT (OUTPATIENT)
Dept: MEDICAL GROUP | Facility: MEDICAL CENTER | Age: 75
End: 2020-06-09
Payer: MEDICARE

## 2020-06-09 VITALS
TEMPERATURE: 97.2 F | OXYGEN SATURATION: 98 % | WEIGHT: 195.11 LBS | SYSTOLIC BLOOD PRESSURE: 130 MMHG | DIASTOLIC BLOOD PRESSURE: 70 MMHG | HEIGHT: 72 IN | BODY MASS INDEX: 26.43 KG/M2 | HEART RATE: 65 BPM

## 2020-06-09 DIAGNOSIS — Z86.73 HISTORY OF CEREBROVASCULAR ACCIDENT (CVA) IN ADULTHOOD: ICD-10-CM

## 2020-06-09 DIAGNOSIS — R26.9 ALTERED MOBILITY DUE TO OLD STROKE: ICD-10-CM

## 2020-06-09 DIAGNOSIS — R53.82 CHRONIC FATIGUE: ICD-10-CM

## 2020-06-09 DIAGNOSIS — Z99.3 WHEELCHAIR DEPENDENCE: ICD-10-CM

## 2020-06-09 DIAGNOSIS — E11.69 TYPE 2 DIABETES MELLITUS WITH OTHER SPECIFIED COMPLICATION, WITH LONG-TERM CURRENT USE OF INSULIN (HCC): ICD-10-CM

## 2020-06-09 DIAGNOSIS — G81.94 LEFT HEMIPARESIS (HCC): ICD-10-CM

## 2020-06-09 DIAGNOSIS — Z23 NEED FOR 23-POLYVALENT PNEUMOCOCCAL POLYSACCHARIDE VACCINE: ICD-10-CM

## 2020-06-09 DIAGNOSIS — Z79.4 TYPE 2 DIABETES MELLITUS WITH OTHER SPECIFIED COMPLICATION, WITH LONG-TERM CURRENT USE OF INSULIN (HCC): ICD-10-CM

## 2020-06-09 DIAGNOSIS — I69.398 ALTERED MOBILITY DUE TO OLD STROKE: ICD-10-CM

## 2020-06-09 PROCEDURE — G0009 ADMIN PNEUMOCOCCAL VACCINE: HCPCS | Performed by: INTERNAL MEDICINE

## 2020-06-09 PROCEDURE — 99214 OFFICE O/P EST MOD 30 MIN: CPT | Mod: 25 | Performed by: INTERNAL MEDICINE

## 2020-06-09 PROCEDURE — 90732 PPSV23 VACC 2 YRS+ SUBQ/IM: CPT | Performed by: INTERNAL MEDICINE

## 2020-06-09 ASSESSMENT — FIBROSIS 4 INDEX: FIB4 SCORE: 2.4

## 2020-06-09 NOTE — ASSESSMENT & PLAN NOTE
Chronic and ongoing problem.  We will renew physical therapy at the [facility] as well as wheelchair fitting evaluation now that COVID-19 pandemic has started to settle down.  Also, he continues to have chronic fatigue likely related to the critical events from the non-Hodgkin's lymphoma, septic shock from neurovirus, treatment with chemotherapy, and previous stroke.

## 2020-06-09 NOTE — ASSESSMENT & PLAN NOTE
New problem by my evaluation. Will screen B12, thyroid, testosterone, and overnight pulse oximetry study. May be a good candidate for a stimulant type medicine to assist with his low energy levels that are likely due to his previous and chronic medical conditions.

## 2020-06-09 NOTE — ASSESSMENT & PLAN NOTE
Chronic and ongoing problem.  Will place a referral for wheelchair evaluation with physical [facility] on where [facility] is [facility].  We will also renew his orders for physical therapy for strengthening and gait training.

## 2020-06-09 NOTE — PROGRESS NOTES
Subjective:   Chief Complaint/History of Present Illness:  Av Bob is a 75 y.o. male established patient who presents today to discuss medical problems as listed below. He is accompanied by his wife, Usha.    Problem   H/O Cerebrovascular accident (CVA) in adulthood    Right hemispheric CVA (12/30/2016):    Presented with LUE weakness, thickened speech and unsteadiness. Stroke work up initiated and patient's MRI showed metastatic disease which led to his diagnosis of non-Hodgkin's lymphoma.    Patient and his partner report that at the time of the reported stroke patient was ambulatory and had left-sided weakness but not nearly to the degree that he has now.  They report that towards the end of his chemotherapy he contracted norovirus and was in critical status with resultant significant hemiaplasia.     ([facility]) Left hemiparesis    Acute 12/2016 at [facility].  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  Symptoms originally improved significantly with chemotherapy and he was walking well, and then hemiparesis worsened significantly after sepsis from UTI and norovirus infection mid 2017.  Currently continuing physical therapy, but remains in a wheelchair.      Wheelchair dependent    He has been using a wheelchair exclusively for mobility. He has never been fitted for a wheelchair through Medicare. This was planned until Covid19 pandemic hit and then was postponed. He has a history of left hemiplegia after a stroke and this worsened after he was critically ill (NHL on chemo, norovirus in ICU and critically ill).     Type 2 Diabetes Mellitus, With Long-Term Current Use of Insulin (Hcc)    Longstanding history of type 2 diabetes on insulin.  Is following with endocrinology, Dr. Rasheed.  He does A1c in fall 2019 was 6.2.  Current regimen includes Trulicity 1.5 mg weekly, Toujeo 30 units at bedtime, and Humalog 5 to 9 units 3 times  daily before meals (usually do not give this).    Av and Usha noticed that he has had lower fasting blood sugars lately.  He will run anywhere from 60-80 in the morning.  In the evening he will be in the low to mid 100s.  He has had recent challenges with fatigue and daytime somnolence.    A1c in 2/2020 was 5.4.     Chronic Fatigue    He reports challenges with ongoing fatigue since all of his major medical events (lymphoma, chemotherapy, prior stroke, norovirus with septic shock) and has not returned to his normal level of energy.    He had his thyroid last evaluated in January 2020 and this was normal. No recent evaluations of B12 or testosterone. His glucose levels fasting are running in the low-mid 60's per the patient and his wife.          Additional History:   Allergies:    Diphenhydramine hcl; Lorazepam; Ciprofloxacin; and Spironolactone     Current Medications:     Current Outpatient Medications   Medication Sig Dispense Refill   • metoprolol SR (TOPROL XL) 100 MG TABLET SR 24 HR Take 1 Tab by mouth every day. 90 Tab 3   • TOUJEO SOLOSTAR 300 UNIT/ML Solution Pen-injector INJECT 45 UNITS AS DIRECTED EVERY NIGHT AT BEDTIME 45 mL 3   • amLODIPine (NORVASC) 5 MG Tab Take 1 Tab by mouth every day. 90 Tab 3   • Multiple Vitamin (MULTI VITAMIN MENS PO) Take  by mouth.     • potassium chloride (KLOR-CON) 8 MEQ tablet TAKE 1 TABLET BY MOUTH EVERY DAY 90 Tab 3   • fluoxetine (PROZAC) 40 MG capsule TAKE 1 CAPSULE BY MOUTH EVERY DAY (Patient taking differently: Take 40 mg by mouth every day.) 90 Cap 3   • ONE TOUCH ULTRA TEST strip USE TO TEST FIVE TIMES DAILY 600 Strip 1   • aspirin EC (ECOTRIN) 81 MG Tablet Delayed Response Take 81 mg by mouth every day.     • Probiotic Product (PROBIOTIC DAILY PO) Take 1 Cap by mouth 2 Times a Day.     • clopidogrel (PLAVIX) 75 MG Tab Take 75 mg by mouth every day.     • magnesium oxide (MAG-OX) 400 MG Tab Take 400 mg by mouth every day.     • finasteride (PROSCAR) 5 MG Tab Take  1 Tab by mouth every day. 30 Tab 0   • atorvastatin (LIPITOR) 80 MG tablet Take 80 mg by mouth every evening.     • fenofibrate (TRICOR) 145 MG Tab Take 145 mg by mouth every evening.     • alfuzosin (UROXATRAL) 10 MG SR tablet Take 10 mg by mouth every day.     • methenamine hip (HIPPREX) 1 GM Tab Take 1 g by mouth 2 times a day.     • losartan (COZAAR) 50 MG Tab Take 1 Tab by mouth every day. (Patient taking differently: Take 100 mg by mouth every day.) 90 Tab 3   • Dulaglutide (TRULICITY) 1.5 MG/0.5ML Solution Pen-injector Inject 1.5 mg as instructed every 7 days. On Tue 12 PEN 3   • Insulin Glargine (TOUJEO MAX SOLOSTAR) 300 UNIT/ML Solution Pen-injector Inject 32 Units as instructed every bedtime.     • insulin lispro, Human, (HUMALOG KWIKPEN) 100 UNIT/ML Solution Pen-injector injection Inject 5-9 Units as instructed 3 times a day before meals. Sliding Scale     • acetaminophen (TYLENOL) 500 MG Tab Take 1,000 mg by mouth every 6 hours as needed for Mild Pain or Moderate Pain.     • Cholecalciferol (VITAMIN D) 2000 units Cap Take 4,000 Units by mouth 2 Times a Day.       No current facility-administered medications for this visit.         Social History:     Social History     Tobacco Use   • Smoking status: Never Smoker   • Smokeless tobacco: Never Used   • Tobacco comment: continued abstinence   Substance Use Topics   • Alcohol use: No   • Drug use: No       ROS: As above in the HPI      Objective:   Physical Exam:    Vitals: /70   Pulse 65   Temp 36.2 °C (97.2 °F) (Temporal)   Ht 1.829 m (6')   Wt 88.5 kg (195 lb 1.7 oz)   SpO2 98%    BMI: Body mass index is 26.46 kg/m².   General/Constitutional: Vitals as above, Well nourished, well developed male in no acute distress   Head/Eyes: Head is grossly normal & atraumatic, bilateral conjunctivae clear and not injected, bilateral EOMI, bilateral PERRLA   ENT: Bilateral external ears grossly normal in appearance, Hearing grossly intact, External nares  normal in appearance and without discharge/bleeding   Respiratory: No respiratory distress, bilateral lungs are clear to ausculation in all lung fields, no wheezing/rhonchi/rales   Cardiovascular: Regular rate and rhythm without murmur/rubs, distal pulses are intact and equal bilaterally (radial), no bilateral lower extremity edema   MSK: Left hemiplegia, can stand and pivot, uses wheelchair for ambulation   Integumentary: No apparent rashes on skin exposed areas   Psych: Judgment grossly appropriate, no apparent depression/anxiety    Health Maintenance:     - Completed      Assessment and Plan:     Problem List Items Addressed This Visit     H/O Cerebrovascular accident (CVA) in adulthood     Chronic and ongoing problem.  We will renew physical therapy at the [facility] as well as wheelchair fitting evaluation now that COVID-19 pandemic has started to settle down.  Also, he continues to have chronic fatigue likely related to the critical events from the non-Hodgkin's lymphoma, septic shock from neurovirus, treatment with chemotherapy, and previous stroke.         Relevant Orders    DME Wheelchair    REFERRAL TO PHYSICAL THERAPY Reason for Therapy: Eval/Treat/Report    Altered mobility due to old stroke    Relevant Orders    DME Wheelchair    REFERRAL TO PHYSICAL THERAPY Reason for Therapy: Eval/Treat/Report    Chronic fatigue     New problem by my evaluation. Will screen B12, thyroid, testosterone, and overnight pulse oximetry study. May be a good candidate for a stimulant type medicine to assist with his low energy levels that are likely due to his previous and chronic medical conditions.         Relevant Orders    TESTOSTERONE F&T MALE ADULT    TSH WITH REFLEX TO FT4    VITAMIN B12    Type 2 diabetes mellitus, with long-term current use of insulin (HCC)     Chronic and ongoing problem. Will update his A1c. I suspect he is still overtreated despite back off on toujeo from 40 U to 30 U. They are holding daytime  yohanalog at this time, he is still using trulicity. Hopefully we can continue to dial back on his regimen.         Relevant Orders    HEMOGLOBIN A1C    Wheelchair dependent     Chronic and ongoing problem.  Will place a referral for wheelchair evaluation with physical therapy at the [facility] on where he is [facility] established.  We will also renew his orders for physical therapy for strengthening and gait training.         Relevant Orders    DME Wheelchair    REFERRAL TO PHYSICAL THERAPY Reason for Therapy: Eval/Treat/Report    ([facility]) Left hemiparesis     Chronic ongoing problem.  We will renew his PT orders through the [facility] them and they will also assist us with a wheelchair fitting evaluation.  These orders were faxed over during our office visit.         Relevant Orders    DME Wheelchair    REFERRAL TO PHYSICAL THERAPY Reason for Therapy: Eval/Treat/Report      Other Visit Diagnoses     Need for 23-polyvalent pneumococcal polysaccharide vaccine        Relevant Orders    PneumoVax PPV23 =>5yo (Completed)             RTC: 3 months.    PLEASE NOTE: This dictation was created using voice recognition software. I have made every reasonable attempt to correct obvious errors, but I expect that there are errors of grammar and possibly content that I did not discover before finalizing the note.

## 2020-06-09 NOTE — ASSESSMENT & PLAN NOTE
Chronic ongoing problem.  We will renew his PT orders through the [facility] them and they will also assist us with a wheelchair fitting evaluation.  These orders were faxed over during our office visit.

## 2020-06-09 NOTE — ASSESSMENT & PLAN NOTE
Chronic and ongoing problem. Will update his A1c. I suspect he is still overtreated despite back off on toujeo from 40 U to 30 U. They are holding daytime humalog at this time, he is still using trulicity. Hopefully we can continue to dial back on his regimen.

## 2020-06-10 ENCOUNTER — HOSPITAL ENCOUNTER (OUTPATIENT)
Dept: RADIOLOGY | Facility: MEDICAL CENTER | Age: 75
End: 2020-06-10
Attending: PHYSICIAN ASSISTANT
Payer: MEDICARE

## 2020-06-10 DIAGNOSIS — N13.30 HYDRONEPHROSIS, UNSPECIFIED HYDRONEPHROSIS TYPE: ICD-10-CM

## 2020-06-10 DIAGNOSIS — E11.3293 CONTROLLED TYPE 2 DIABETES MELLITUS WITH BOTH EYES AFFECTED BY MILD NONPROLIFERATIVE RETINOPATHY WITHOUT MACULAR EDEMA, WITHOUT LONG-TERM CURRENT USE OF INSULIN (HCC): ICD-10-CM

## 2020-06-10 PROCEDURE — 76775 US EXAM ABDO BACK WALL LIM: CPT

## 2020-06-10 RX ORDER — DULAGLUTIDE 1.5 MG/.5ML
INJECTION, SOLUTION SUBCUTANEOUS
Qty: 6 ML | Refills: 1 | Status: SHIPPED | OUTPATIENT
Start: 2020-06-10 | End: 2020-12-02 | Stop reason: SDUPTHER

## 2020-06-18 ENCOUNTER — HOSPITAL ENCOUNTER (OUTPATIENT)
Dept: RADIOLOGY | Facility: MEDICAL CENTER | Age: 75
End: 2020-06-18
Attending: PHYSICIAN ASSISTANT
Payer: MEDICARE

## 2020-06-18 DIAGNOSIS — N13.30 HYDRONEPHROSIS, UNSPECIFIED HYDRONEPHROSIS TYPE: ICD-10-CM

## 2020-06-18 PROCEDURE — 78708 K FLOW/FUNCT IMAGE W/DRUG: CPT

## 2020-06-19 ENCOUNTER — HOSPITAL ENCOUNTER (OUTPATIENT)
Dept: LAB | Facility: MEDICAL CENTER | Age: 75
End: 2020-06-19
Attending: INTERNAL MEDICINE
Payer: MEDICARE

## 2020-06-19 DIAGNOSIS — R53.82 CHRONIC FATIGUE: ICD-10-CM

## 2020-06-19 DIAGNOSIS — Z79.4 TYPE 2 DIABETES MELLITUS WITH OTHER SPECIFIED COMPLICATION, WITH LONG-TERM CURRENT USE OF INSULIN (HCC): ICD-10-CM

## 2020-06-19 DIAGNOSIS — E11.69 TYPE 2 DIABETES MELLITUS WITH OTHER SPECIFIED COMPLICATION, WITH LONG-TERM CURRENT USE OF INSULIN (HCC): ICD-10-CM

## 2020-06-19 LAB
EST. AVERAGE GLUCOSE BLD GHB EST-MCNC: 120 MG/DL
HBA1C MFR BLD: 5.8 % (ref 0–5.6)

## 2020-06-19 PROCEDURE — 84270 ASSAY OF SEX HORMONE GLOBUL: CPT

## 2020-06-19 PROCEDURE — 83036 HEMOGLOBIN GLYCOSYLATED A1C: CPT | Mod: GA

## 2020-06-19 PROCEDURE — 84443 ASSAY THYROID STIM HORMONE: CPT

## 2020-06-19 PROCEDURE — 84402 ASSAY OF FREE TESTOSTERONE: CPT

## 2020-06-19 PROCEDURE — 36415 COLL VENOUS BLD VENIPUNCTURE: CPT | Mod: GA

## 2020-06-19 PROCEDURE — 82607 VITAMIN B-12: CPT

## 2020-06-19 PROCEDURE — 84403 ASSAY OF TOTAL TESTOSTERONE: CPT

## 2020-06-20 LAB
TSH SERPL DL<=0.005 MIU/L-ACNC: 2.21 UIU/ML (ref 0.38–5.33)
VIT B12 SERPL-MCNC: 702 PG/ML (ref 211–911)

## 2020-06-21 LAB
SHBG SERPL-SCNC: 116 NMOL/L (ref 11–80)
TESTOST FREE MFR SERPL: 0.7 % (ref 1.6–2.9)
TESTOST FREE SERPL-MCNC: 19 PG/ML (ref 47–244)
TESTOST SERPL-MCNC: 262 NG/DL (ref 300–720)

## 2020-06-25 ENCOUNTER — HOSPITAL ENCOUNTER (OUTPATIENT)
Dept: LAB | Facility: MEDICAL CENTER | Age: 75
End: 2020-06-25
Attending: UROLOGY
Payer: MEDICARE

## 2020-06-25 PROCEDURE — 87086 URINE CULTURE/COLONY COUNT: CPT

## 2020-06-25 PROCEDURE — 87077 CULTURE AEROBIC IDENTIFY: CPT

## 2020-06-25 PROCEDURE — 87186 SC STD MICRODIL/AGAR DIL: CPT

## 2020-06-26 NOTE — RESULT ENCOUNTER NOTE
Brayden Ley and Usha,    I have the lab results from a few days ago.    The testosterone levels are certainly low and we could reach out to Dr. Barry to see if he thinks adding back some testosterone would be a good idea and we could also run it by Dr. Tucker to make sure he thinks your heart is strong enough, as hormone replacement therapy can have associated risks.     The vitamin B12 and thyroid levels were normal. You are prediabetic, but just barely and nothing that requires starting new medicines at this time.    Thanks,  Dr. Bunch

## 2020-07-01 ENCOUNTER — APPOINTMENT (OUTPATIENT)
Dept: MEDICAL GROUP | Facility: MEDICAL CENTER | Age: 75
End: 2020-07-01
Payer: MEDICARE

## 2020-07-06 ENCOUNTER — TELEMEDICINE (OUTPATIENT)
Dept: MEDICAL GROUP | Facility: MEDICAL CENTER | Age: 75
End: 2020-07-06
Payer: MEDICARE

## 2020-07-06 VITALS
DIASTOLIC BLOOD PRESSURE: 80 MMHG | HEIGHT: 72 IN | SYSTOLIC BLOOD PRESSURE: 125 MMHG | HEART RATE: 68 BPM | BODY MASS INDEX: 25.73 KG/M2 | WEIGHT: 190 LBS

## 2020-07-06 DIAGNOSIS — G47.34 NOCTURNAL HYPOXIA: ICD-10-CM

## 2020-07-06 DIAGNOSIS — R53.82 CHRONIC FATIGUE: ICD-10-CM

## 2020-07-06 DIAGNOSIS — R79.89 LOW TESTOSTERONE IN MALE: ICD-10-CM

## 2020-07-06 PROCEDURE — 99214 OFFICE O/P EST MOD 30 MIN: CPT | Mod: 95,CR | Performed by: INTERNAL MEDICINE

## 2020-07-06 RX ORDER — LOSARTAN POTASSIUM 100 MG/1
100 TABLET ORAL DAILY
Qty: 100 TAB | Refills: 3 | Status: SHIPPED | OUTPATIENT
Start: 2020-07-06 | End: 2020-07-08 | Stop reason: SDUPTHER

## 2020-07-06 RX ORDER — ALLOPURINOL 100 MG/1
TABLET ORAL
COMMUNITY
End: 2020-08-17

## 2020-07-06 ASSESSMENT — FIBROSIS 4 INDEX: FIB4 SCORE: 2.4

## 2020-07-06 NOTE — ASSESSMENT & PLAN NOTE
Chronic and ongoing problem.  Reviewed results with patient.  He would like to start with referral to sleep medicine for sleep study.  He would like to hold off on testosterone replacement therapy if possible but would be willing to try in the future if absolutely necessary.

## 2020-07-06 NOTE — PROGRESS NOTES
Telemedicine Visit: Established Patient     This encounter was conducted via Zoom .   Verbal consent was obtained. Patient's identity was verified.    Subjective:   CC: follow up abnormal overnight oximetry  Av Bob is a 75 y.o. male presenting for evaluation and management of:    Problem   Low Testosterone in Male       Ref. Range 6/19/2020 08:14   Free Testosterone Latest Ref Range: 47 - 244 pg/mL 19 (L)   Sex Hormone Bind Globulin Latest Ref Range: 11 - 80 nmol/L 116 (H)   Testosterone % Free Latest Ref Range: 1.6 - 2.9 % 0.7 (L)   Testosterone,Total Latest Ref Range: 300 - 720 ng/dL 262 (L)     During evaluation for the patient's significant daytime fatigue we obtain testosterone levels.  His total testosterone level is low at 262.  He and his partner unsure if this is ever been evaluated in past.  Follows with urology due to previous issues with urinary retention and questionable bladder lesion with 2 cystoscopies.  Also had hydronephrosis as a result of his retention.  He now seems to have chronic urinary tract infections with Klebsiella, sensitive to most antibiotics.    We briefly discussed testosterone supplementation and he would like to hold off at this time until after we sort out the significant hypoxia that we identified on his overnight polysomnogram.     Nocturnal Hypoxia    We completed an overnight oximetry test on June 18, 2020.    Total duration of testing 9 hours and 24 minutes.    Time spent less than 88% was 132 minutes.    Time spent less than 89% 153 minutes.    High oxygen level 100%  Low oxygen level 59%  Basal oxygen 92%  Artifact events 4 minutes    Test was completed due to patient is complaining of significant fatigue.  He has a history of stroke as well as lymphoma treated with chemotherapy.  Unclear if this is a central process, obstructive process, or combination of both.  No prior sleep studies in the past.  He has never required oxygen therapy that he is aware  of.     Chronic Fatigue    He reports challenges with ongoing fatigue since all of his major medical events (lymphoma, chemotherapy, prior stroke, norovirus with septic shock) and has not returned to his normal level of energy.    He had his thyroid last evaluated in January 2020 and this was normal. No recent evaluations of B12 or testosterone. His glucose levels fasting are running in the low-mid 60's per the patient and his wife.    Work-up showed that testosterone level was deficient.  Overnight oximetry also demonstrated hypoxia.         ROS   Denies any recent fevers or chills. No nausea or vomiting. No chest pains or shortness of breath.     Allergies   Allergen Reactions   • Diphenhydramine Hcl Anxiety     Pt is able to tolerate  Mg benadryl with less anxiety   • Lorazepam Unspecified     Disorientation   • Ciprofloxacin      Rash,stomach ache   • Spironolactone      Acute kidney injury       Current medicines (including changes today)  Current Outpatient Medications   Medication Sig Dispense Refill   • losartan (COZAAR) 100 MG Tab Take 1 Tab by mouth every day. 100 Tab 3   • TRULICITY 1.5 MG/0.5ML Solution Pen-injector INJECT CONTENTS OF 1 SYRINGE SUBCUTANEOUSLY EVERY 7 DAYS ON TUESDAY 6 mL 1   • metoprolol SR (TOPROL XL) 100 MG TABLET SR 24 HR Take 1 Tab by mouth every day. 90 Tab 3   • amLODIPine (NORVASC) 5 MG Tab Take 1 Tab by mouth every day. 90 Tab 3   • Multiple Vitamin (MULTI VITAMIN MENS PO) Take  by mouth.     • potassium chloride (KLOR-CON) 8 MEQ tablet TAKE 1 TABLET BY MOUTH EVERY DAY 90 Tab 3   • fluoxetine (PROZAC) 40 MG capsule TAKE 1 CAPSULE BY MOUTH EVERY DAY (Patient taking differently: Take 40 mg by mouth every day.) 90 Cap 3   • ONE TOUCH ULTRA TEST strip USE TO TEST FIVE TIMES DAILY 600 Strip 1   • aspirin EC (ECOTRIN) 81 MG Tablet Delayed Response Take 81 mg by mouth every day.     • Probiotic Product (PROBIOTIC DAILY PO) Take 1 Cap by mouth 2 Times a Day.     • clopidogrel (PLAVIX) 75  MG Tab Take 75 mg by mouth every day.     • magnesium oxide (MAG-OX) 400 MG Tab Take 400 mg by mouth every day.     • finasteride (PROSCAR) 5 MG Tab Take 1 Tab by mouth every day. 30 Tab 0   • atorvastatin (LIPITOR) 80 MG tablet Take 80 mg by mouth every evening.     • fenofibrate (TRICOR) 145 MG Tab Take 145 mg by mouth every evening.     • alfuzosin (UROXATRAL) 10 MG SR tablet Take 10 mg by mouth every day.     • methenamine hip (HIPPREX) 1 GM Tab Take 1 g by mouth 2 times a day.     • Insulin Glargine (TOUJEO MAX SOLOSTAR) 300 UNIT/ML Solution Pen-injector Inject 32 Units as instructed every bedtime.     • insulin lispro, Human, (HUMALOG KWIKPEN) 100 UNIT/ML Solution Pen-injector injection Inject 5-9 Units as instructed 3 times a day before meals. Sliding Scale     • acetaminophen (TYLENOL) 500 MG Tab Take 1,000 mg by mouth every 6 hours as needed for Mild Pain or Moderate Pain.     • Cholecalciferol (VITAMIN D) 2000 units Cap Take 4,000 Units by mouth 2 Times a Day.       No current facility-administered medications for this visit.        Patient Active Problem List    Diagnosis Date Noted   • Hydronephrosis 01/20/2020     Priority: Medium   • Paroxysmal atrial fibrillation (HCC) 05/23/2017     Priority: Medium   • (HCC) Hypertension associated with diabetes 03/22/2017     Priority: Medium   • H/O Cerebrovascular accident (CVA) in adulthood 12/30/2016     Priority: Medium   • Coronary artery disease involving native coronary artery 12/30/2016     Priority: Medium   • (HCC) Hyperlipidemia associated with type 2 diabetes mellitus 12/13/2011     Priority: Medium   • GERD with esophagitis 10/19/2018     Priority: Low   • Recurrent UTI 04/26/2018     Priority: Low   • (HCC) Left hemiparesis 12/27/2017     Priority: Low   • (HCC) Diabetic nephropathy associated with type 2 diabetes mellitus 11/30/2017     Priority: Low   • Psychophysiological insomnia 11/21/2017     Priority: Low   • (HCC) Moderate episode of  recurrent major depressive disorder 11/07/2017     Priority: Low   • Wheelchair dependent 11/07/2017     Priority: Low   • H/O non-Hodgkin's lymphoma 05/08/2017     Priority: Low   • Type 2 diabetes mellitus, with long-term current use of insulin ([facility]) 12/30/2016     Priority: Low   • Stage 3 chronic kidney disease ([facility]) 08/25/2016     Priority: Low   • Idiopathic chronic gout of multiple sites without tophus 01/28/2014     Priority: Low   • Low testosterone in male 07/06/2020   • Nocturnal hypoxia 07/06/2020   • Chronic fatigue 06/09/2020   • Essential hypertension, benign 05/06/2020   • Polypharmacy 02/04/2020   • Renal cyst 01/29/2020   • Benign prostatic hyperplasia with urinary obstruction 01/29/2020   • Altered mobility due to old stroke 01/22/2020   • Normocytic anemia 01/21/2020   • History of renal calculi 11/19/2019   • Neurogenic bladder 11/19/2019   • PVD (peripheral vascular disease) ([facility]) 10/08/2019   • Strain of flexor muscle of right hip 05/20/2019   • Muscle strain of right gluteal region 05/20/2019   • Left hand weakness 05/20/2019   • Urinary incontinence 02/19/2019   • ([facility]) Tibial artery disease 12/27/2018       Family History   Problem Relation Age of Onset   • Heart Disease Father         CAD   • Diabetes Father    • Cancer Mother    • Psychiatric Illness Mother         Depression   • Depression Mother    • Kidney stones Brother    • Heart Disease Brother    • Psychiatric Illness Brother         Depression   • Depression Brother    • Suicide Attempts Other    • Psychiatric Illness Other         autism       He  has a past medical history of ([facility]) Chronic ulcer of right lower extremity with fat layer exposed (12/27/2018), Acute on chronic renal failure ([facility]) (1/21/2020), Acute respiratory failure with hypoxia ([facility]) (5/20/2017), CAD (coronary artery disease), Cancer ([facility]), Cataract, Cerebrovascular accident (CVA) ([facility]) (12/30/2016), CKD (chronic kidney disease) stage 3, GFR 30-59 ml/min ([facility]),  Controlled gout (2014), Coronary atherosclerosis of native coronary artery, Depression, Diabetes ([facility]), Enterococcal septicemia ([facility]) (8/12/2017), Roberto hematuria (1/29/2020), Hypertension, Hypokalemia (2012), Hypomagnesemia (08/12/2017), Influenza A (1/26/2020), Leg edema, right (1/22/2020), Lymphoma ([facility]) (2/19/2017), Mixed hyperlipidemia, Nephrolithiasis (2006), Normocytic hypochromic anemia (5/20/2017), NSTEMI (non-ST elevated myocardial infarction) ([facility]) (07/18/2017), Pain, Polyneuropathy in diabetes(357.2) (9/11/2013), Renal mass (1/21/2020), Septic shock ([facility]) (5/20/2017), Skin ulcer of calf ([facility]) (2015), Stroke ([facility]) (2016), Urinary bladder disorder, Urinary incontinence, and Wound of left leg (2012). He also has no past medical history of Suicide attempt ([facility]).  He  has a past surgical history that includes lithotripsy; angioplasty (1997); wound closure general (4/3/2012); tonsillectomy and adenoidectomy; cataract extraction with iol; solo by laparoscopy (1998); cystoscopy stent placement (Left, 2/12/2018); ureteroscopy (Left, 2/12/2018); lasertripsy (Left, 2/12/2018); [facility] cardiac cath (1997); and [facility] cardiac cath (2009).       Objective:   /80   Pulse 68   Wt 86.2 kg (190 lb)   BMI 25.77 kg/m²     Physical Exam:  Constitutional: Alert, no distress, well-groomed.  Skin: No rashes in visible areas.  Eye: Round. Conjunctiva clear, lids normal. No icterus.   ENMT: Lips pink without lesions, good dentition, moist mucous membranes. Phonation normal.  Neck: No masses, no thyromegaly. Moves freely without pain.  CV: Pulse as reported by patient is normal.  Respiratory: Unlabored respiratory effort, no cough or audible wheeze  Psych: Alert and oriented x3, normal affect and mood.       Assessment and Plan:   The following treatment plan was discussed:     1. Nocturnal hypoxia  - REFERRAL TO SLEEP STUDIES    2. Low testosterone in male    3. Chronic fatigue    Other orders  - losartan (COZAAR) 100 MG Tab;  Take 1 Tab by mouth every day.  Dispense: 100 Tab; Refill: 3    Low testosterone in male  New problem.  Will fax a copy of his testosterone labs to his urologist, Dr. Barry, so he can be aware.  Patient would like to hold off on any testosterone replacement at this time.  We originally checked this due to significant fatigue.  As patient also seems to have untreated sleep apnea (oxygen levels of 59% on overnight pulse oximetry) we will first start with an overnight sleep study.    Nocturnal hypoxia  New problem, requires additional evaluation.  Will place urgent evaluation to sleep studies to get him scheduled.  His oxygen level goes as low as 59%.  I offered to set him up with oxygen in the meantime but he declines at this time.  He is not sure how well he would tolerate positive pressure therapy but is willing to going for the evaluation.    Chronic fatigue  Chronic and ongoing problem.  Reviewed results with patient.  He would like to start with referral to sleep medicine for sleep study.  He would like to hold off on testosterone replacement therapy if possible but would be willing to try in the future if absolutely necessary.      Follow-up: No follow-ups on file.

## 2020-07-06 NOTE — ASSESSMENT & PLAN NOTE
New problem.  Will fax a copy of his testosterone labs to his urologist, Dr. Barry, so he can be aware.  Patient would like to hold off on any testosterone replacement at this time.  We originally checked this due to significant fatigue.  As patient also seems to have untreated sleep apnea (oxygen levels of 59% on overnight pulse oximetry) we will first start with an overnight sleep study.

## 2020-07-06 NOTE — ASSESSMENT & PLAN NOTE
New problem, requires additional evaluation.  Will place urgent evaluation to sleep studies to get him scheduled.  His oxygen level goes as low as 59%.  I offered to set him up with oxygen in the meantime but he declines at this time.  He is not sure how well he would tolerate positive pressure therapy but is willing to going for the evaluation.

## 2020-07-07 ENCOUNTER — APPOINTMENT (RX ONLY)
Dept: URBAN - METROPOLITAN AREA CLINIC 35 | Facility: CLINIC | Age: 75
Setting detail: DERMATOLOGY
End: 2020-07-07

## 2020-07-07 ENCOUNTER — PATIENT MESSAGE (OUTPATIENT)
Dept: CARDIOLOGY | Facility: MEDICAL CENTER | Age: 75
End: 2020-07-07

## 2020-07-07 DIAGNOSIS — I10 ESSENTIAL HYPERTENSION: ICD-10-CM

## 2020-07-07 PROBLEM — C44.629 SQUAMOUS CELL CARCINOMA OF SKIN OF LEFT UPPER LIMB, INCLUDING SHOULDER: Status: ACTIVE | Noted: 2020-07-07

## 2020-07-07 PROCEDURE — 11602 EXC TR-EXT MAL+MARG 1.1-2 CM: CPT

## 2020-07-07 PROCEDURE — 12032 INTMD RPR S/A/T/EXT 2.6-7.5: CPT

## 2020-07-07 PROCEDURE — ? DIAGNOSIS COMMENT

## 2020-07-07 PROCEDURE — ? EXCISION

## 2020-07-07 NOTE — PROCEDURE: EXCISION
Dermal Autograft Text: The defect edges were debeveled with a #15 scalpel blade.  Given the location of the defect, shape of the defect and the proximity to free margins a dermal autograft was deemed most appropriate.  Using a sterile surgical marker, the primary defect shape was transferred to the donor site. The area thus outlined was incised deep to adipose tissue with a #15 scalpel blade.  The harvested graft was then trimmed of adipose and epidermal tissue until only dermis was left.  The skin graft was then placed in the primary defect and oriented appropriately.
Complex Repair And Xenograft Text: The defect edges were debeveled with a #15 scalpel blade.  The primary defect was closed partially with a complex linear closure.  Given the location of the defect, shape of the defect and the proximity to free margins a xenograft was deemed most appropriate to repair the remaining defect.  The graft was trimmed to fit the size of the remaining defect.  The graft was then placed in the primary defect, oriented appropriately, and sutured into place.
Island Pedicle Flap-Requiring Vessel Identification Text: The defect edges were debeveled with a #15 scalpel blade.  Given the location of the defect, shape of the defect and the proximity to free margins an island pedicle advancement flap was deemed most appropriate.  Using a sterile surgical marker, an appropriate advancement flap was drawn, based on the axial vessel mentioned above, incorporating the defect, outlining the appropriate donor tissue and placing the expected incisions within the relaxed skin tension lines where possible.    The area thus outlined was incised deep to adipose tissue with a #15 scalpel blade.  The skin margins were undermined to an appropriate distance in all directions around the primary defect and laterally outward around the island pedicle utilizing iris scissors.  There was minimal undermining beneath the pedicle flap.
Z Plasty Text: The lesion was extirpated to the level of the fat with a #15 scalpel blade.  Given the location of the defect, shape of the defect and the proximity to free margins a Z-plasty was deemed most appropriate for repair.  Using a sterile surgical marker, the appropriate transposition arms of the Z-plasty were drawn incorporating the defect and placing the expected incisions within the relaxed skin tension lines where possible.    The area thus outlined was incised deep to adipose tissue with a #15 scalpel blade.  The skin margins were undermined to an appropriate distance in all directions utilizing iris scissors.  The opposing transposition arms were then transposed into place in opposite direction and anchored with interrupted buried subcutaneous sutures.
Render The Repair Note As A Separate Paragraph: No
Crescentic Complex Repair Preamble Text (Leave Blank If You Do Not Want): Extensive wide undermining was performed.
Complex Repair And Double Advancement Flap Text: The defect edges were debeveled with a #15 scalpel blade.  The primary defect was closed partially with a complex linear closure.  Given the location of the remaining defect, shape of the defect and the proximity to free margins a double advancement flap was deemed most appropriate for complete closure of the defect.  Using a sterile surgical marker, an appropriate advancement flap was drawn incorporating the defect and placing the expected incisions within the relaxed skin tension lines where possible.    The area thus outlined was incised deep to adipose tissue with a #15 scalpel blade.  The skin margins were undermined to an appropriate distance in all directions utilizing iris scissors.
Show Anatomic Location From Referring Provider Variable: Yes
H Plasty Text: Given the location of the defect, shape of the defect and the proximity to free margins a H-plasty was deemed most appropriate for repair.  Using a sterile surgical marker, the appropriate advancement arms of the H-plasty were drawn incorporating the defect and placing the expected incisions within the relaxed skin tension lines where possible. The area thus outlined was incised deep to adipose tissue with a #15 scalpel blade. The skin margins were undermined to an appropriate distance in all directions utilizing iris scissors.  The opposing advancement arms were then advanced into place in opposite direction and anchored with interrupted buried subcutaneous sutures.
No Repair - Repaired With Adjacent Surgical Defect Text (Leave Blank If You Do Not Want): After the excision the defect was repaired concurrently with another surgical defect which was in close approximation.
Billing Type: Third-Party Bill
A-T Advancement Flap Text: The defect edges were debeveled with a #15 scalpel blade.  Given the location of the defect, shape of the defect and the proximity to free margins an A-T advancement flap was deemed most appropriate.  Using a sterile surgical marker, an appropriate advancement flap was drawn incorporating the defect and placing the expected incisions within the relaxed skin tension lines where possible.    The area thus outlined was incised deep to adipose tissue with a #15 scalpel blade.  The skin margins were undermined to an appropriate distance in all directions utilizing iris scissors.
Skin Substitute Text: The defect edges were debeveled with a #15 scalpel blade.  Given the location of the defect, shape of the defect and the proximity to free margins a skin substitute graft was deemed most appropriate.  The graft material was trimmed to fit the size of the defect. The graft was then placed in the primary defect and oriented appropriately.
Elliptical Excision Additional Text (Leave Blank If You Do Not Want): The margin was drawn around the clinically apparent lesion.  An elliptical shape was then drawn on the skin incorporating the lesion and margins.  Incisions were then made along these lines to the appropriate tissue plane and the lesion was extirpated.
Cheek-To-Nose Interpolation Flap Text: A decision was made to reconstruct the defect utilizing an interpolation axial flap and a staged reconstruction.  A telfa template was made of the defect.  This telfa template was then used to outline the Cheek-To-Nose Interpolation flap.  The donor area for the pedicle flap was then injected with anesthesia.  The flap was excised through the skin and subcutaneous tissue down to the layer of the underlying musculature.  The interpolation flap was carefully excised within this deep plane to maintain its blood supply.  The edges of the donor site were undermined.   The donor site was closed in a primary fashion.  The pedicle was then rotated into position and sutured.  Once the tube was sutured into place, adequate blood supply was confirmed with blanching and refill.  The pedicle was then wrapped with xeroform gauze and dressed appropriately with a telfa and gauze bandage to ensure continued blood supply and protect the attached pedicle.
S Plasty Text: Given the location and shape of the defect, and the orientation of relaxed skin tension lines, an S-plasty was deemed most appropriate for repair.  Using a sterile surgical marker, the appropriate outline of the S-plasty was drawn, incorporating the defect and placing the expected incisions within the relaxed skin tension lines where possible.  The area thus outlined was incised deep to adipose tissue with a #15 scalpel blade.  The skin margins were undermined to an appropriate distance in all directions utilizing iris scissors. The skin flaps were advanced over the defect.  The opposing margins were then approximated with interrupted buried subcutaneous sutures.
Scalpel Size: 15 blade
Additional Anesthesia Volume In Cc: 0
Split-Thickness Skin Graft Text: The defect edges were debeveled with a #15 scalpel blade.  Given the location of the defect, shape of the defect and the proximity to free margins a split thickness skin graft was deemed most appropriate.  Using a sterile surgical marker, the primary defect shape was transferred to the donor site. The split thickness graft was then harvested.  The skin graft was then placed in the primary defect and oriented appropriately.
Complex Repair And Ftsg Text: The defect edges were debeveled with a #15 scalpel blade.  The primary defect was closed partially with a complex linear closure.  Given the location of the defect, shape of the defect and the proximity to free margins a full thickness skin graft was deemed most appropriate to repair the remaining defect.  The graft was trimmed to fit the size of the remaining defect.  The graft was then placed in the primary defect, oriented appropriately, and sutured into place.
Perilesional Excision Additional Text (Leave Blank If You Do Not Want): The margin was drawn around the clinically apparent lesion. Incisions were then made along these lines to the appropriate tissue plane and the lesion was extirpated.
Complex Repair And O-L Flap Text: The defect edges were debeveled with a #15 scalpel blade.  The primary defect was closed partially with a complex linear closure.  Given the location of the remaining defect, shape of the defect and the proximity to free margins an O-L flap was deemed most appropriate for complete closure of the defect.  Using a sterile surgical marker, an appropriate flap was drawn incorporating the defect and placing the expected incisions within the relaxed skin tension lines where possible.    The area thus outlined was incised deep to adipose tissue with a #15 scalpel blade.  The skin margins were undermined to an appropriate distance in all directions utilizing iris scissors.
Complex Repair And Bilobe Flap Text: The defect edges were debeveled with a #15 scalpel blade.  The primary defect was closed partially with a complex linear closure.  Given the location of the remaining defect, shape of the defect and the proximity to free margins a bilobe flap was deemed most appropriate for complete closure of the defect.  Using a sterile surgical marker, an appropriate advancement flap was drawn incorporating the defect and placing the expected incisions within the relaxed skin tension lines where possible.    The area thus outlined was incised deep to adipose tissue with a #15 scalpel blade.  The skin margins were undermined to an appropriate distance in all directions utilizing iris scissors.
Repair Performed By Another Provider Text (Leave Blank If You Do Not Want): After the tissue was excised the defect was repaired by another provider.
Excisional Biopsy Additional Text (Leave Blank If You Do Not Want): The margin was drawn around the clinically apparent lesion. An elliptical shape was then drawn on the skin incorporating the lesion and margins.  Incisions were then made along these lines to the appropriate tissue plane and the lesion was extirpated.
Complex Repair And A-T Advancement Flap Text: The defect edges were debeveled with a #15 scalpel blade.  The primary defect was closed partially with a complex linear closure.  Given the location of the remaining defect, shape of the defect and the proximity to free margins an A-T advancement flap was deemed most appropriate for complete closure of the defect.  Using a sterile surgical marker, an appropriate advancement flap was drawn incorporating the defect and placing the expected incisions within the relaxed skin tension lines where possible.    The area thus outlined was incised deep to adipose tissue with a #15 scalpel blade.  The skin margins were undermined to an appropriate distance in all directions utilizing iris scissors.
Complex Repair And Tissue Cultured Epidermal Autograft Text: The defect edges were debeveled with a #15 scalpel blade.  The primary defect was closed partially with a complex linear closure.  Given the location of the defect, shape of the defect and the proximity to free margins an tissue cultured epidermal autograft was deemed most appropriate to repair the remaining defect.  The graft was trimmed to fit the size of the remaining defect.  The graft was then placed in the primary defect, oriented appropriately, and sutured into place.
O-T Plasty Text: The defect edges were debeveled with a #15 scalpel blade.  Given the location of the defect, shape of the defect and the proximity to free margins an O-T plasty was deemed most appropriate.  Using a sterile surgical marker, an appropriate O-T plasty was drawn incorporating the defect and placing the expected incisions within the relaxed skin tension lines where possible.    The area thus outlined was incised deep to adipose tissue with a #15 scalpel blade.  The skin margins were undermined to an appropriate distance in all directions utilizing iris scissors.
Complex Repair And V-Y Plasty Text: The defect edges were debeveled with a #15 scalpel blade.  The primary defect was closed partially with a complex linear closure.  Given the location of the remaining defect, shape of the defect and the proximity to free margins a V-Y plasty was deemed most appropriate for complete closure of the defect.  Using a sterile surgical marker, an appropriate advancement flap was drawn incorporating the defect and placing the expected incisions within the relaxed skin tension lines where possible.    The area thus outlined was incised deep to adipose tissue with a #15 scalpel blade.  The skin margins were undermined to an appropriate distance in all directions utilizing iris scissors.
Graft Donor Site Bandage (Optional-Leave Blank If You Don't Want In Note): Steri-strips and a pressure bandage were applied to the donor site.
Dressing: dry sterile dressing
Complex Repair And Modified Advancement Flap Text: The defect edges were debeveled with a #15 scalpel blade.  The primary defect was closed partially with a complex linear closure.  Given the location of the remaining defect, shape of the defect and the proximity to free margins a modified advancement flap was deemed most appropriate for complete closure of the defect.  Using a sterile surgical marker, an appropriate advancement flap was drawn incorporating the defect and placing the expected incisions within the relaxed skin tension lines where possible.    The area thus outlined was incised deep to adipose tissue with a #15 scalpel blade.  The skin margins were undermined to an appropriate distance in all directions utilizing iris scissors.
Burow's Advancement Flap Text: The defect edges were debeveled with a #15 scalpel blade.  Given the location of the defect and the proximity to free margins a Burow's advancement flap was deemed most appropriate.  Using a sterile surgical marker, the appropriate advancement flap was drawn incorporating the defect and placing the expected incisions within the relaxed skin tension lines where possible.    The area thus outlined was incised deep to adipose tissue with a #15 scalpel blade.  The skin margins were undermined to an appropriate distance in all directions utilizing iris scissors.
Advancement Flap (Single) Text: The defect edges were debeveled with a #15 scalpel blade.  Given the location of the defect and the proximity to free margins a single advancement flap was deemed most appropriate.  Using a sterile surgical marker, an appropriate advancement flap was drawn incorporating the defect and placing the expected incisions within the relaxed skin tension lines where possible.    The area thus outlined was incised deep to adipose tissue with a #15 scalpel blade.  The skin margins were undermined to an appropriate distance in all directions utilizing iris scissors.
Alar Island Pedicle Flap Text: The defect edges were debeveled with a #15 scalpel blade.  Given the location of the defect, shape of the defect and the proximity to the alar rim an island pedicle advancement flap was deemed most appropriate.  Using a sterile surgical marker, an appropriate advancement flap was drawn incorporating the defect, outlining the appropriate donor tissue and placing the expected incisions within the nasal ala running parallel to the alar rim. The area thus outlined was incised with a #15 scalpel blade.  The skin margins were undermined minimally to an appropriate distance in all directions around the primary defect and laterally outward around the island pedicle utilizing iris scissors.  There was minimal undermining beneath the pedicle flap.
Interpolation Flap Text: A decision was made to reconstruct the defect utilizing an interpolation axial flap and a staged reconstruction.  A telfa template was made of the defect.  This telfa template was then used to outline the interpolation flap.  The donor area for the pedicle flap was then injected with anesthesia.  The flap was excised through the skin and subcutaneous tissue down to the layer of the underlying musculature.  The interpolation flap was carefully excised within this deep plane to maintain its blood supply.  The edges of the donor site were undermined.   The donor site was closed in a primary fashion.  The pedicle was then rotated into position and sutured.  Once the tube was sutured into place, adequate blood supply was confirmed with blanching and refill.  The pedicle was then wrapped with xeroform gauze and dressed appropriately with a telfa and gauze bandage to ensure continued blood supply and protect the attached pedicle.
Star Wedge Flap Text: The defect edges were debeveled with a #15 scalpel blade.  Given the location of the defect, shape of the defect and the proximity to free margins a star wedge flap was deemed most appropriate.  Using a sterile surgical marker, an appropriate rotation flap was drawn incorporating the defect and placing the expected incisions within the relaxed skin tension lines where possible. The area thus outlined was incised deep to adipose tissue with a #15 scalpel blade.  The skin margins were undermined to an appropriate distance in all directions utilizing iris scissors.
Mercedes Flap Text: The defect edges were debeveled with a #15 scalpel blade.  Given the location of the defect, shape of the defect and the proximity to free margins a Mercedes flap was deemed most appropriate.  Using a sterile surgical marker, an appropriate advancement flap was drawn incorporating the defect and placing the expected incisions within the relaxed skin tension lines where possible. The area thus outlined was incised deep to adipose tissue with a #15 scalpel blade.  The skin margins were undermined to an appropriate distance in all directions utilizing iris scissors.
Complex Repair And Split-Thickness Skin Graft Text: The defect edges were debeveled with a #15 scalpel blade.  The primary defect was closed partially with a complex linear closure.  Given the location of the defect, shape of the defect and the proximity to free margins a split thickness skin graft was deemed most appropriate to repair the remaining defect.  The graft was trimmed to fit the size of the remaining defect.  The graft was then placed in the primary defect, oriented appropriately, and sutured into place.
Number Of Deep Sutures (Optional): 4
Size Of Margin In Cm: 0.3
Transposition Flap Text: The defect edges were debeveled with a #15 scalpel blade.  Given the location of the defect and the proximity to free margins a transposition flap was deemed most appropriate.  Using a sterile surgical marker, an appropriate transposition flap was drawn incorporating the defect.    The area thus outlined was incised deep to adipose tissue with a #15 scalpel blade.  The skin margins were undermined to an appropriate distance in all directions utilizing iris scissors.
Complex Repair And Rhombic Flap Text: The defect edges were debeveled with a #15 scalpel blade.  The primary defect was closed partially with a complex linear closure.  Given the location of the remaining defect, shape of the defect and the proximity to free margins a rhombic flap was deemed most appropriate for complete closure of the defect.  Using a sterile surgical marker, an appropriate advancement flap was drawn incorporating the defect and placing the expected incisions within the relaxed skin tension lines where possible.    The area thus outlined was incised deep to adipose tissue with a #15 scalpel blade.  The skin margins were undermined to an appropriate distance in all directions utilizing iris scissors.
Crescentic Intermediate Repair Preamble Text (Leave Blank If You Do Not Want): Undermining was performed with blunt dissection.
Partial Purse String (Simple) Text: Given the location of the defect and the characteristics of the surrounding skin a simple purse string closure was deemed most appropriate.  Undermining was performed circumferentially around the surgical defect.  A purse string suture was then placed and tightened. Wound tension of the circular defect prevented complete closure of the wound.
Anesthesia Type: 1% lidocaine with epinephrine
Dermal Closure: simple interrupted
Slit Excision Additional Text (Leave Blank If You Do Not Want): A linear line was drawn on the skin overlying the lesion. An incision was made slowly until the lesion was visualized.  Once visualized, the lesion was removed with blunt dissection.
Complex Repair And Transposition Flap Text: The defect edges were debeveled with a #15 scalpel blade.  The primary defect was closed partially with a complex linear closure.  Given the location of the remaining defect, shape of the defect and the proximity to free margins a transposition flap was deemed most appropriate for complete closure of the defect.  Using a sterile surgical marker, an appropriate advancement flap was drawn incorporating the defect and placing the expected incisions within the relaxed skin tension lines where possible.    The area thus outlined was incised deep to adipose tissue with a #15 scalpel blade.  The skin margins were undermined to an appropriate distance in all directions utilizing iris scissors.
Paramedian Forehead Flap Text: A decision was made to reconstruct the defect utilizing an interpolation axial flap and a staged reconstruction.  A telfa template was made of the defect.  This telfa template was then used to outline the paramedian forehead pedicle flap.  The donor area for the pedicle flap was then injected with anesthesia.  The flap was excised through the skin and subcutaneous tissue down to the layer of the underlying musculature.  The pedicle flap was carefully excised within this deep plane to maintain its blood supply.  The edges of the donor site were undermined.   The donor site was closed in a primary fashion.  The pedicle was then rotated into position and sutured.  Once the tube was sutured into place, adequate blood supply was confirmed with blanching and refill.  The pedicle was then wrapped with xeroform gauze and dressed appropriately with a telfa and gauze bandage to ensure continued blood supply and protect the attached pedicle.
Spiral Flap Text: The defect edges were debeveled with a #15 scalpel blade.  Given the location of the defect, shape of the defect and the proximity to free margins a spiral flap was deemed most appropriate.  Using a sterile surgical marker, an appropriate rotation flap was drawn incorporating the defect and placing the expected incisions within the relaxed skin tension lines where possible. The area thus outlined was incised deep to adipose tissue with a #15 scalpel blade.  The skin margins were undermined to an appropriate distance in all directions utilizing iris scissors.
Excision Depth: adipose tissue
Wound Care: Petrolatum
Complex Repair And Dermal Autograft Text: The defect edges were debeveled with a #15 scalpel blade.  The primary defect was closed partially with a complex linear closure.  Given the location of the defect, shape of the defect and the proximity to free margins an dermal autograft was deemed most appropriate to repair the remaining defect.  The graft was trimmed to fit the size of the remaining defect.  The graft was then placed in the primary defect, oriented appropriately, and sutured into place.
Banner Transposition Flap Text: The defect edges were debeveled with a #15 scalpel blade.  Given the location of the defect and the proximity to free margins a Banner transposition flap was deemed most appropriate.  Using a sterile surgical marker, an appropriate flap drawn around the defect. The area thus outlined was incised deep to adipose tissue with a #15 scalpel blade.  The skin margins were undermined to an appropriate distance in all directions utilizing iris scissors.
Island Pedicle Flap Text: The defect edges were debeveled with a #15 scalpel blade.  Given the location of the defect, shape of the defect and the proximity to free margins an island pedicle advancement flap was deemed most appropriate.  Using a sterile surgical marker, an appropriate advancement flap was drawn incorporating the defect, outlining the appropriate donor tissue and placing the expected incisions within the relaxed skin tension lines where possible.    The area thus outlined was incised deep to adipose tissue with a #15 scalpel blade.  The skin margins were undermined to an appropriate distance in all directions around the primary defect and laterally outward around the island pedicle utilizing iris scissors.  There was minimal undermining beneath the pedicle flap.
Rhombic Flap Text: The defect edges were debeveled with a #15 scalpel blade.  Given the location of the defect and the proximity to free margins a rhombic flap was deemed most appropriate.  Using a sterile surgical marker, an appropriate rhombic flap was drawn incorporating the defect.    The area thus outlined was incised deep to adipose tissue with a #15 scalpel blade.  The skin margins were undermined to an appropriate distance in all directions utilizing iris scissors.
Cartilage Graft Text: The defect edges were debeveled with a #15 scalpel blade.  Given the location of the defect, shape of the defect, the fact the defect involved a full thickness cartilage defect a cartilage graft was deemed most appropriate.  An appropriate donor site was identified, cleansed, and anesthetized. The cartilage graft was then harvested and transferred to the recipient site, oriented appropriately and then sutured into place.  The secondary defect was then repaired using a primary closure.
Tissue Cultured Epidermal Autograft Text: The defect edges were debeveled with a #15 scalpel blade.  Given the location of the defect, shape of the defect and the proximity to free margins a tissue cultured epidermal autograft was deemed most appropriate.  The graft was then trimmed to fit the size of the defect.  The graft was then placed in the primary defect and oriented appropriately.
Complex Repair And M Plasty Text: The defect edges were debeveled with a #15 scalpel blade.  The primary defect was closed partially with a complex linear closure.  Given the location of the remaining defect, shape of the defect and the proximity to free margins an M plasty was deemed most appropriate for complete closure of the defect.  Using a sterile surgical marker, an appropriate advancement flap was drawn incorporating the defect and placing the expected incisions within the relaxed skin tension lines where possible.    The area thus outlined was incised deep to adipose tissue with a #15 scalpel blade.  The skin margins were undermined to an appropriate distance in all directions utilizing iris scissors.
Bilateral Helical Rim Advancement Flap Text: The defect edges were debeveled with a #15 blade scalpel.  Given the location of the defect and the proximity to free margins (helical rim) a bilateral helical rim advancement flap was deemed most appropriate.  Using a sterile surgical marker, the appropriate advancement flaps were drawn incorporating the defect and placing the expected incisions between the helical rim and antihelix where possible.  The area thus outlined was incised through and through with a #15 scalpel blade.  With a skin hook and iris scissors, the flaps were gently and sharply undermined and freed up.
Mastoid Interpolation Flap Text: A decision was made to reconstruct the defect utilizing an interpolation axial flap and a staged reconstruction.  A telfa template was made of the defect.  This telfa template was then used to outline the mastoid interpolation flap.  The donor area for the pedicle flap was then injected with anesthesia.  The flap was excised through the skin and subcutaneous tissue down to the layer of the underlying musculature.  The pedicle flap was carefully excised within this deep plane to maintain its blood supply.  The edges of the donor site were undermined.   The donor site was closed in a primary fashion.  The pedicle was then rotated into position and sutured.  Once the tube was sutured into place, adequate blood supply was confirmed with blanching and refill.  The pedicle was then wrapped with xeroform gauze and dressed appropriately with a telfa and gauze bandage to ensure continued blood supply and protect the attached pedicle.
Cheek Interpolation Flap Text: A decision was made to reconstruct the defect utilizing an interpolation axial flap and a staged reconstruction.  A telfa template was made of the defect.  This telfa template was then used to outline the Cheek Interpolation flap.  The donor area for the pedicle flap was then injected with anesthesia.  The flap was excised through the skin and subcutaneous tissue down to the layer of the underlying musculature.  The interpolation flap was carefully excised within this deep plane to maintain its blood supply.  The edges of the donor site were undermined.   The donor site was closed in a primary fashion.  The pedicle was then rotated into position and sutured.  Once the tube was sutured into place, adequate blood supply was confirmed with blanching and refill.  The pedicle was then wrapped with xeroform gauze and dressed appropriately with a telfa and gauze bandage to ensure continued blood supply and protect the attached pedicle.
Island Pedicle Flap With Canthal Suspension Text: The defect edges were debeveled with a #15 scalpel blade.  Given the location of the defect, shape of the defect and the proximity to free margins an island pedicle advancement flap was deemed most appropriate.  Using a sterile surgical marker, an appropriate advancement flap was drawn incorporating the defect, outlining the appropriate donor tissue and placing the expected incisions within the relaxed skin tension lines where possible. The area thus outlined was incised deep to adipose tissue with a #15 scalpel blade.  The skin margins were undermined to an appropriate distance in all directions around the primary defect and laterally outward around the island pedicle utilizing iris scissors.  There was minimal undermining beneath the pedicle flap. A suspension suture was placed in the canthal tendon to prevent tension and prevent ectropion.
Pre-Excision Curettage Text (Leave Blank If You Do Not Want): Prior to drawing the surgical margin the visible lesion was removed with electrodesiccation and curettage to clearly define the lesion size.
Rotation Flap Text: The defect edges were debeveled with a #15 scalpel blade.  Given the location of the defect, shape of the defect and the proximity to free margins a rotation flap was deemed most appropriate.  Using a sterile surgical marker, an appropriate rotation flap was drawn incorporating the defect and placing the expected incisions within the relaxed skin tension lines where possible.    The area thus outlined was incised deep to adipose tissue with a #15 scalpel blade.  The skin margins were undermined to an appropriate distance in all directions utilizing iris scissors.
Complex Repair And Z Plasty Text: The defect edges were debeveled with a #15 scalpel blade.  The primary defect was closed partially with a complex linear closure.  Given the location of the remaining defect, shape of the defect and the proximity to free margins a Z plasty was deemed most appropriate for complete closure of the defect.  Using a sterile surgical marker, an appropriate advancement flap was drawn incorporating the defect and placing the expected incisions within the relaxed skin tension lines where possible.    The area thus outlined was incised deep to adipose tissue with a #15 scalpel blade.  The skin margins were undermined to an appropriate distance in all directions utilizing iris scissors.
Intermediate / Complex Repair - Final Wound Length In Cm: 4.5
Epidermal Sutures: 4-0 Nylon
Complex Repair And Single Advancement Flap Text: The defect edges were debeveled with a #15 scalpel blade.  The primary defect was closed partially with a complex linear closure.  Given the location of the remaining defect, shape of the defect and the proximity to free margins a single advancement flap was deemed most appropriate for complete closure of the defect.  Using a sterile surgical marker, an appropriate advancement flap was drawn incorporating the defect and placing the expected incisions within the relaxed skin tension lines where possible.    The area thus outlined was incised deep to adipose tissue with a #15 scalpel blade.  The skin margins were undermined to an appropriate distance in all directions utilizing iris scissors.
Posterior Auricular Interpolation Flap Text: A decision was made to reconstruct the defect utilizing an interpolation axial flap and a staged reconstruction.  A telfa template was made of the defect.  This telfa template was then used to outline the posterior auricular interpolation flap.  The donor area for the pedicle flap was then injected with anesthesia.  The flap was excised through the skin and subcutaneous tissue down to the layer of the underlying musculature.  The pedicle flap was carefully excised within this deep plane to maintain its blood supply.  The edges of the donor site were undermined.   The donor site was closed in a primary fashion.  The pedicle was then rotated into position and sutured.  Once the tube was sutured into place, adequate blood supply was confirmed with blanching and refill.  The pedicle was then wrapped with xeroform gauze and dressed appropriately with a telfa and gauze bandage to ensure continued blood supply and protect the attached pedicle.
Detail Level: Detailed
Lab: [id number]
Size Of Lesion In Cm: 1
Double O-Z Plasty Text: The defect edges were debeveled with a #15 scalpel blade.  Given the location of the defect, shape of the defect and the proximity to free margins a Double O-Z plasty (double transposition flap) was deemed most appropriate.  Using a sterile surgical marker, the appropriate transposition flaps were drawn incorporating the defect and placing the expected incisions within the relaxed skin tension lines where possible. The area thus outlined was incised deep to adipose tissue with a #15 scalpel blade.  The skin margins were undermined to an appropriate distance in all directions utilizing iris scissors.  Hemostasis was achieved with electrocautery.  The flaps were then transposed into place, one clockwise and the other counterclockwise, and anchored with interrupted buried subcutaneous sutures.
O-T Advancement Flap Text: The defect edges were debeveled with a #15 scalpel blade.  Given the location of the defect, shape of the defect and the proximity to free margins an O-T advancement flap was deemed most appropriate.  Using a sterile surgical marker, an appropriate advancement flap was drawn incorporating the defect and placing the expected incisions within the relaxed skin tension lines where possible.    The area thus outlined was incised deep to adipose tissue with a #15 scalpel blade.  The skin margins were undermined to an appropriate distance in all directions utilizing iris scissors.
O-Z Plasty Text: The defect edges were debeveled with a #15 scalpel blade.  Given the location of the defect, shape of the defect and the proximity to free margins an O-Z plasty (double transposition flap) was deemed most appropriate.  Using a sterile surgical marker, the appropriate transposition flaps were drawn incorporating the defect and placing the expected incisions within the relaxed skin tension lines where possible.    The area thus outlined was incised deep to adipose tissue with a #15 scalpel blade.  The skin margins were undermined to an appropriate distance in all directions utilizing iris scissors.  Hemostasis was achieved with electrocautery.  The flaps were then transposed into place, one clockwise and the other counterclockwise, and anchored with interrupted buried subcutaneous sutures.
Undermining Location (Optional): in the superficial subcutaneous fat
Purse String (Simple) Text: Given the location of the defect and the characteristics of the surrounding skin a purse string simple closure was deemed most appropriate.  Undermining was performed circumferentially around the surgical defect.  A purse string suture was then placed and tightened.
Complex Repair And O-T Advancement Flap Text: The defect edges were debeveled with a #15 scalpel blade.  The primary defect was closed partially with a complex linear closure.  Given the location of the remaining defect, shape of the defect and the proximity to free margins an O-T advancement flap was deemed most appropriate for complete closure of the defect.  Using a sterile surgical marker, an appropriate advancement flap was drawn incorporating the defect and placing the expected incisions within the relaxed skin tension lines where possible.    The area thus outlined was incised deep to adipose tissue with a #15 scalpel blade.  The skin margins were undermined to an appropriate distance in all directions utilizing iris scissors.
O-L Flap Text: The defect edges were debeveled with a #15 scalpel blade.  Given the location of the defect, shape of the defect and the proximity to free margins an O-L flap was deemed most appropriate.  Using a sterile surgical marker, an appropriate advancement flap was drawn incorporating the defect and placing the expected incisions within the relaxed skin tension lines where possible.    The area thus outlined was incised deep to adipose tissue with a #15 scalpel blade.  The skin margins were undermined to an appropriate distance in all directions utilizing iris scissors.
Melolabial Transposition Flap Text: The defect edges were debeveled with a #15 scalpel blade.  Given the location of the defect and the proximity to free margins a melolabial flap was deemed most appropriate.  Using a sterile surgical marker, an appropriate melolabial transposition flap was drawn incorporating the defect.    The area thus outlined was incised deep to adipose tissue with a #15 scalpel blade.  The skin margins were undermined to an appropriate distance in all directions utilizing iris scissors.
Complex Repair And Dorsal Nasal Flap Text: The defect edges were debeveled with a #15 scalpel blade.  The primary defect was closed partially with a complex linear closure.  Given the location of the remaining defect, shape of the defect and the proximity to free margins a dorsal nasal flap was deemed most appropriate for complete closure of the defect.  Using a sterile surgical marker, an appropriate flap was drawn incorporating the defect and placing the expected incisions within the relaxed skin tension lines where possible.    The area thus outlined was incised deep to adipose tissue with a #15 scalpel blade.  The skin margins were undermined to an appropriate distance in all directions utilizing iris scissors.
Consent was obtained from the patient. The risks and benefits to therapy were discussed in detail. Specifically, the risks of infection, scarring, bleeding, prolonged wound healing, incomplete removal, allergy to anesthesia, nerve injury and recurrence were addressed. Prior to the procedure, the treatment site was clearly identified and confirmed by the patient. All components of Universal Protocol/PAUSE Rule completed.
Rhomboid Transposition Flap Text: The defect edges were debeveled with a #15 scalpel blade.  Given the location of the defect and the proximity to free margins a rhomboid transposition flap was deemed most appropriate.  Using a sterile surgical marker, an appropriate rhomboid flap was drawn incorporating the defect.    The area thus outlined was incised deep to adipose tissue with a #15 scalpel blade.  The skin margins were undermined to an appropriate distance in all directions utilizing iris scissors.
Complex Repair And W Plasty Text: The defect edges were debeveled with a #15 scalpel blade.  The primary defect was closed partially with a complex linear closure.  Given the location of the remaining defect, shape of the defect and the proximity to free margins a W plasty was deemed most appropriate for complete closure of the defect.  Using a sterile surgical marker, an appropriate advancement flap was drawn incorporating the defect and placing the expected incisions within the relaxed skin tension lines where possible.    The area thus outlined was incised deep to adipose tissue with a #15 scalpel blade.  The skin margins were undermined to an appropriate distance in all directions utilizing iris scissors.
Bi-Rhombic Flap Text: The defect edges were debeveled with a #15 scalpel blade.  Given the location of the defect and the proximity to free margins a bi-rhombic flap was deemed most appropriate.  Using a sterile surgical marker, an appropriate rhombic flap was drawn incorporating the defect. The area thus outlined was incised deep to adipose tissue with a #15 scalpel blade.  The skin margins were undermined to an appropriate distance in all directions utilizing iris scissors.
Post-Care Instructions: I reviewed with the patient in detail post-care instructions. Patient is not to engage in any heavy lifting, exercise which causes pulling on the excision site, or swimming for the next 14 days. The original dressing should be left in place for 2-3 days unless it becomes wet.  The dressing should then be changed daily with fresh petrolatum applied with a clean Q-tip and new bandage placed until the sutures are removed.  Should the patient develop any fevers, chills, bleeding, severe pain patient will contact the office immediately.
W Plasty Text: The lesion was extirpated to the level of the fat with a #15 scalpel blade.  Given the location of the defect, shape of the defect and the proximity to free margins a W-plasty was deemed most appropriate for repair.  Using a sterile surgical marker, the appropriate transposition arms of the W-plasty were drawn incorporating the defect and placing the expected incisions within the relaxed skin tension lines where possible.    The area thus outlined was incised deep to adipose tissue with a #15 scalpel blade.  The skin margins were undermined to an appropriate distance in all directions utilizing iris scissors.  The opposing transposition arms were then transposed into place in opposite direction and anchored with interrupted buried subcutaneous sutures.
Advancement-Rotation Flap Text: The defect edges were debeveled with a #15 scalpel blade.  Given the location of the defect, shape of the defect and the proximity to free margins an advancement-rotation flap was deemed most appropriate.  Using a sterile surgical marker, an appropriate flap was drawn incorporating the defect and placing the expected incisions within the relaxed skin tension lines where possible. The area thus outlined was incised deep to adipose tissue with a #15 scalpel blade.  The skin margins were undermined to an appropriate distance in all directions utilizing iris scissors.
Purse String (Intermediate) Text: Given the location of the defect and the characteristics of the surrounding skin a purse string intermediate closure was deemed most appropriate.  Undermining was performed circumfirentially around the surgical defect.  A purse string suture was then placed and tightened.
Keystone Flap Text: The defect edges were debeveled with a #15 scalpel blade.  Given the location of the defect, shape of the defect a keystone flap was deemed most appropriate.  Using a sterile surgical marker, an appropriate keystone flap was drawn incorporating the defect, outlining the appropriate donor tissue and placing the expected incisions within the relaxed skin tension lines where possible. The area thus outlined was incised deep to adipose tissue with a #15 scalpel blade.  The skin margins were undermined to an appropriate distance in all directions around the primary defect and laterally outward around the flap utilizing iris scissors.
Home Suture Removal Text: Patient will remove sutures at home.  If they have any questions or difficulties they will call the office.
Bilobed Transposition Flap Text: The defect edges were debeveled with a #15 scalpel blade.  Given the location of the defect and the proximity to free margins a bilobed transposition flap was deemed most appropriate.  Using a sterile surgical marker, an appropriate bilobe flap drawn around the defect.    The area thus outlined was incised deep to adipose tissue with a #15 scalpel blade.  The skin margins were undermined to an appropriate distance in all directions utilizing iris scissors.
Positioning (Leave Blank If You Do Not Want): The patient was placed in a comfortable position exposing the surgical site.
Chonodrocutaneous Helical Advancement Flap Text: The defect edges were debeveled with a #15 scalpel blade.  Given the location of the defect and the proximity to free margins a chondrocutaneous helical advancement flap was deemed most appropriate.  Using a sterile surgical marker, the appropriate advancement flap was drawn incorporating the defect and placing the expected incisions within the relaxed skin tension lines where possible.    The area thus outlined was incised deep to adipose tissue with a #15 scalpel blade.  The skin margins were undermined to an appropriate distance in all directions utilizing iris scissors.
Lab Facility: [id number]
Muscle Hinge Flap Text: The defect edges were debeveled with a #15 scalpel blade.  Given the size, depth and location of the defect and the proximity to free margins a muscle hinge flap was deemed most appropriate.  Using a sterile surgical marker, an appropriate hinge flap was drawn incorporating the defect. The area thus outlined was incised with a #15 scalpel blade.  The skin margins were undermined to an appropriate distance in all directions utilizing iris scissors.
Information: Selecting Yes will display possible errors in your note based on the variables you have selected. This validation is only offered as a suggestion for you. PLEASE NOTE THAT THE VALIDATION TEXT WILL BE REMOVED WHEN YOU FINALIZE YOUR NOTE. IF YOU WANT TO FAX A PRELIMINARY NOTE YOU WILL NEED TO TOGGLE THIS TO 'NO' IF YOU DO NOT WANT IT IN YOUR FAXED NOTE.
V-Y Flap Text: The defect edges were debeveled with a #15 scalpel blade.  Given the location of the defect, shape of the defect and the proximity to free margins a V-Y flap was deemed most appropriate.  Using a sterile surgical marker, an appropriate advancement flap was drawn incorporating the defect and placing the expected incisions within the relaxed skin tension lines where possible.    The area thus outlined was incised deep to adipose tissue with a #15 scalpel blade.  The skin margins were undermined to an appropriate distance in all directions utilizing iris scissors.
Helical Rim Advancement Flap Text: The defect edges were debeveled with a #15 blade scalpel.  Given the location of the defect and the proximity to free margins (helical rim) a double helical rim advancement flap was deemed most appropriate.  Using a sterile surgical marker, the appropriate advancement flaps were drawn incorporating the defect and placing the expected incisions between the helical rim and antihelix where possible.  The area thus outlined was incised through and through with a #15 scalpel blade.  With a skin hook and iris scissors, the flaps were gently and sharply undermined and freed up.
O-Z Flap Text: The defect edges were debeveled with a #15 scalpel blade.  Given the location of the defect, shape of the defect and the proximity to free margins an O-Z flap was deemed most appropriate.  Using a sterile surgical marker, an appropriate transposition flap was drawn incorporating the defect and placing the expected incisions within the relaxed skin tension lines where possible. The area thus outlined was incised deep to adipose tissue with a #15 scalpel blade.  The skin margins were undermined to an appropriate distance in all directions utilizing iris scissors.
Deep Sutures: 4-0 Vicryl
Dorsal Nasal Flap Text: The defect edges were debeveled with a #15 scalpel blade.  Given the location of the defect and the proximity to free margins a dorsal nasal flap was deemed most appropriate.  Using a sterile surgical marker, an appropriate dorsal nasal flap was drawn around the defect.    The area thus outlined was incised deep to adipose tissue with a #15 scalpel blade.  The skin margins were undermined to an appropriate distance in all directions utilizing iris scissors.
Hemostasis: Electrodesiccation
Ftsg Text: The defect edges were debeveled with a #15 scalpel blade.  Given the location of the defect, shape of the defect and the proximity to free margins a full thickness skin graft was deemed most appropriate.  Using a sterile surgical marker, the primary defect shape was transferred to the donor site. The area thus outlined was incised deep to adipose tissue with a #15 scalpel blade.  The harvested graft was then trimmed of adipose tissue until only dermis and epidermis was left.  The skin margins of the secondary defect were undermined to an appropriate distance in all directions utilizing iris scissors.  The secondary defect was closed with interrupted buried subcutaneous sutures.  The skin edges were then re-apposed with running  sutures.  The skin graft was then placed in the primary defect and oriented appropriately.
Curvilinear Excision Additional Text (Leave Blank If You Do Not Want): The margin was drawn around the clinically apparent lesion.  A curvilinear shape was then drawn on the skin incorporating the lesion and margins.  Incisions were then made along these lines to the appropriate tissue plane and the lesion was extirpated.
Complex Repair And Rotation Flap Text: The defect edges were debeveled with a #15 scalpel blade.  The primary defect was closed partially with a complex linear closure.  Given the location of the remaining defect, shape of the defect and the proximity to free margins a rotation flap was deemed most appropriate for complete closure of the defect.  Using a sterile surgical marker, an appropriate advancement flap was drawn incorporating the defect and placing the expected incisions within the relaxed skin tension lines where possible.    The area thus outlined was incised deep to adipose tissue with a #15 scalpel blade.  The skin margins were undermined to an appropriate distance in all directions utilizing iris scissors.
Ear Star Wedge Flap Text: The defect edges were debeveled with a #15 blade scalpel.  Given the location of the defect and the proximity to free margins (helical rim) an ear star wedge flap was deemed most appropriate.  Using a sterile surgical marker, the appropriate flap was drawn incorporating the defect and placing the expected incisions between the helical rim and antihelix where possible.  The area thus outlined was incised through and through with a #15 scalpel blade.
Saucerization Excision Additional Text (Leave Blank If You Do Not Want): The margin was drawn around the clinically apparent lesion.  Incisions were then made along these lines, in a tangential fashion, to the appropriate tissue plane and the lesion was extirpated.
Modified Advancement Flap Text: The defect edges were debeveled with a #15 scalpel blade.  Given the location of the defect, shape of the defect and the proximity to free margins a modified advancement flap was deemed most appropriate.  Using a sterile surgical marker, an appropriate advancement flap was drawn incorporating the defect and placing the expected incisions within the relaxed skin tension lines where possible.    The area thus outlined was incised deep to adipose tissue with a #15 scalpel blade.  The skin margins were undermined to an appropriate distance in all directions utilizing iris scissors.
Lip Wedge Excision Repair Text: Given the location of the defect and the proximity to free margins a full thickness wedge repair was deemed most appropriate.  Using a sterile surgical marker, the appropriate repair was drawn incorporating the defect and placing the expected incisions perpendicular to the vermilion border.  The vermilion border was also meticulously outlined to ensure appropriate reapproximation during the repair.  The area thus outlined was incised through and through with a #15 scalpel blade.  The muscularis and dermis were reaproximated with deep sutures following hemostasis. Care was taken to realign the vermilion border before proceeding with the superficial closure.  Once the vermilion was realigned the superfical and mucosal closure was finished.
Hatchet Flap Text: The defect edges were debeveled with a #15 scalpel blade.  Given the location of the defect, shape of the defect and the proximity to free margins a hatchet flap was deemed most appropriate.  Using a sterile surgical marker, an appropriate hatchet flap was drawn incorporating the defect and placing the expected incisions within the relaxed skin tension lines where possible.    The area thus outlined was incised deep to adipose tissue with a #15 scalpel blade.  The skin margins were undermined to an appropriate distance in all directions utilizing iris scissors.
Double Island Pedicle Flap Text: The defect edges were debeveled with a #15 scalpel blade.  Given the location of the defect, shape of the defect and the proximity to free margins a double island pedicle advancement flap was deemed most appropriate.  Using a sterile surgical marker, an appropriate advancement flap was drawn incorporating the defect, outlining the appropriate donor tissue and placing the expected incisions within the relaxed skin tension lines where possible.    The area thus outlined was incised deep to adipose tissue with a #15 scalpel blade.  The skin margins were undermined to an appropriate distance in all directions around the primary defect and laterally outward around the island pedicle utilizing iris scissors.  There was minimal undermining beneath the pedicle flap.
Bilobed Flap Text: The defect edges were debeveled with a #15 scalpel blade.  Given the location of the defect and the proximity to free margins a bilobe flap was deemed most appropriate.  Using a sterile surgical marker, an appropriate bilobe flap drawn around the defect.    The area thus outlined was incised deep to adipose tissue with a #15 scalpel blade.  The skin margins were undermined to an appropriate distance in all directions utilizing iris scissors.
Epidermal Autograft Text: The defect edges were debeveled with a #15 scalpel blade.  Given the location of the defect, shape of the defect and the proximity to free margins an epidermal autograft was deemed most appropriate.  Using a sterile surgical marker, the primary defect shape was transferred to the donor site. The epidermal graft was then harvested.  The skin graft was then placed in the primary defect and oriented appropriately.
Partial Purse String (Intermediate) Text: Given the location of the defect and the characteristics of the surrounding skin an intermediate purse string closure was deemed most appropriate.  Undermining was performed circumferentially around the surgical defect.  A purse string suture was then placed and tightened. Wound tension of the circular defect prevented complete closure of the wound.
Complex Repair And Epidermal Autograft Text: The defect edges were debeveled with a #15 scalpel blade.  The primary defect was closed partially with a complex linear closure.  Given the location of the defect, shape of the defect and the proximity to free margins an epidermal autograft was deemed most appropriate to repair the remaining defect.  The graft was trimmed to fit the size of the remaining defect.  The graft was then placed in the primary defect, oriented appropriately, and sutured into place.
Double O-Z Flap Text: The defect edges were debeveled with a #15 scalpel blade.  Given the location of the defect, shape of the defect and the proximity to free margins a Double O-Z flap was deemed most appropriate.  Using a sterile surgical marker, an appropriate transposition flap was drawn incorporating the defect and placing the expected incisions within the relaxed skin tension lines where possible. The area thus outlined was incised deep to adipose tissue with a #15 scalpel blade.  The skin margins were undermined to an appropriate distance in all directions utilizing iris scissors.
Anesthesia Type: 1% lidocaine with 1:100,000 epinephrine and a 1:10 solution of 8.4% sodium bicarbonate
V-Y Plasty Text: The defect edges were debeveled with a #15 scalpel blade.  Given the location of the defect, shape of the defect and the proximity to free margins an V-Y advancement flap was deemed most appropriate.  Using a sterile surgical marker, an appropriate advancement flap was drawn incorporating the defect and placing the expected incisions within the relaxed skin tension lines where possible.    The area thus outlined was incised deep to adipose tissue with a #15 scalpel blade.  The skin margins were undermined to an appropriate distance in all directions utilizing iris scissors.
Excision Method: Elliptical
Repair Type: Intermediate
Composite Graft Text: The defect edges were debeveled with a #15 scalpel blade.  Given the location of the defect, shape of the defect, the proximity to free margins and the fact the defect was full thickness a composite graft was deemed most appropriate.  The defect was outline and then transferred to the donor site.  A full thickness graft was then excised from the donor site. The graft was then placed in the primary defect, oriented appropriately and then sutured into place.  The secondary defect was then repaired using a primary closure.
Complex Repair And Melolabial Flap Text: The defect edges were debeveled with a #15 scalpel blade.  The primary defect was closed partially with a complex linear closure.  Given the location of the remaining defect, shape of the defect and the proximity to free margins a melolabial flap was deemed most appropriate for complete closure of the defect.  Using a sterile surgical marker, an appropriate advancement flap was drawn incorporating the defect and placing the expected incisions within the relaxed skin tension lines where possible.    The area thus outlined was incised deep to adipose tissue with a #15 scalpel blade.  The skin margins were undermined to an appropriate distance in all directions utilizing iris scissors.
Complex Repair And Double M Plasty Text: The defect edges were debeveled with a #15 scalpel blade.  The primary defect was closed partially with a complex linear closure.  Given the location of the remaining defect, shape of the defect and the proximity to free margins a double M plasty was deemed most appropriate for complete closure of the defect.  Using a sterile surgical marker, an appropriate advancement flap was drawn incorporating the defect and placing the expected incisions within the relaxed skin tension lines where possible.    The area thus outlined was incised deep to adipose tissue with a #15 scalpel blade.  The skin margins were undermined to an appropriate distance in all directions utilizing iris scissors.
Fusiform Excision Additional Text (Leave Blank If You Do Not Want): The margin was drawn around the clinically apparent lesion.  A fusiform shape was then drawn on the skin incorporating the lesion and margins.  Incisions were then made along these lines to the appropriate tissue plane and the lesion was extirpated.
Melolabial Interpolation Flap Text: A decision was made to reconstruct the defect utilizing an interpolation axial flap and a staged reconstruction.  A telfa template was made of the defect.  This telfa template was then used to outline the melolabial interpolation flap.  The donor area for the pedicle flap was then injected with anesthesia.  The flap was excised through the skin and subcutaneous tissue down to the layer of the underlying musculature.  The pedicle flap was carefully excised within this deep plane to maintain its blood supply.  The edges of the donor site were undermined.   The donor site was closed in a primary fashion.  The pedicle was then rotated into position and sutured.  Once the tube was sutured into place, adequate blood supply was confirmed with blanching and refill.  The pedicle was then wrapped with xeroform gauze and dressed appropriately with a telfa and gauze bandage to ensure continued blood supply and protect the attached pedicle.
Suture Removal: 10 days
Crescentic Advancement Flap Text: The defect edges were debeveled with a #15 scalpel blade.  Given the location of the defect and the proximity to free margins a crescentic advancement flap was deemed most appropriate.  Using a sterile surgical marker, the appropriate advancement flap was drawn incorporating the defect and placing the expected incisions within the relaxed skin tension lines where possible.    The area thus outlined was incised deep to adipose tissue with a #15 scalpel blade.  The skin margins were undermined to an appropriate distance in all directions utilizing iris scissors.
Trilobed Flap Text: The defect edges were debeveled with a #15 scalpel blade.  Given the location of the defect and the proximity to free margins a trilobed flap was deemed most appropriate.  Using a sterile surgical marker, an appropriate trilobed flap drawn around the defect.    The area thus outlined was incised deep to adipose tissue with a #15 scalpel blade.  The skin margins were undermined to an appropriate distance in all directions utilizing iris scissors.
Epidermal Closure: running
Xenograft Text: The defect edges were debeveled with a #15 scalpel blade.  Given the location of the defect, shape of the defect and the proximity to free margins a xenograft was deemed most appropriate.  The graft was then trimmed to fit the size of the defect.  The graft was then placed in the primary defect and oriented appropriately.
Estimated Blood Loss (Cc): minimal
Advancement Flap (Double) Text: The defect edges were debeveled with a #15 scalpel blade.  Given the location of the defect and the proximity to free margins a double advancement flap was deemed most appropriate.  Using a sterile surgical marker, the appropriate advancement flaps were drawn incorporating the defect and placing the expected incisions within the relaxed skin tension lines where possible.    The area thus outlined was incised deep to adipose tissue with a #15 scalpel blade.  The skin margins were undermined to an appropriate distance in all directions utilizing iris scissors.
Mucosal Advancement Flap Text: Given the location of the defect, shape of the defect and the proximity to free margins a mucosal advancement flap was deemed most appropriate. Incisions were made with a 15 blade scalpel in the appropriate fashion along the cutaneous vermilion border and the mucosal lip. The remaining actinically damaged mucosal tissue was excised.  The mucosal advancement flap was then elevated to the gingival sulcus with care taken to preserve the neurovascular structures and advanced into the primary defect. Care was taken to ensure that precise realignment of the vermilion border was achieved.
Complex Repair And Skin Substitute Graft Text: The defect edges were debeveled with a #15 scalpel blade.  The primary defect was closed partially with a complex linear closure.  Given the location of the remaining defect, shape of the defect and the proximity to free margins a skin substitute graft was deemed most appropriate to repair the remaining defect.  The graft was trimmed to fit the size of the remaining defect.  The graft was then placed in the primary defect, oriented appropriately, and sutured into place.
Peng Advancement Flap Text: The defect edges were debeveled with a #15 scalpel blade.  Given the location of the defect, shape of the defect and the proximity to free margins a Peng advancement flap was deemed most appropriate.  Using a sterile surgical marker, an appropriate advancement flap was drawn incorporating the defect and placing the expected incisions within the relaxed skin tension lines where possible. The area thus outlined was incised deep to adipose tissue with a #15 scalpel blade.  The skin margins were undermined to an appropriate distance in all directions utilizing iris scissors.
Suturegard Body: The suture ends were repeatedly re-tightened and re-clamped to achieve the desired tissue expansion.
Suturegard Retention Suture: 0-0 Nylon
Retention Suture Text: Retention sutures were placed to support the closure and prevent dehiscence.
Burow's Graft Text: The defect edges were debeveled with a #15 scalpel blade.  Given the location of the defect, shape of the defect, the proximity to free margins and the presence of a standing cone deformity a Burow's skin graft was deemed most appropriate. The standing cone was removed and this tissue was then trimmed to the shape of the primary defect. The adipose tissue was also removed until only dermis and epidermis were left.  The skin margins of the secondary defect were undermined to an appropriate distance in all directions utilizing iris scissors.  The secondary defect was closed with interrupted buried subcutaneous sutures.  The skin edges were then re-apposed with running  sutures.  The skin graft was then placed in the primary defect and oriented appropriately.
Complex Repair And Burow's Graft Text: The defect edges were debeveled with a #15 scalpel blade.  The primary defect was closed partially with a complex linear closure.  Given the location of the defect, shape of the defect, the proximity to free margins and the presence of a standing cone deformity a Burow's graft was deemed most appropriate to repair the remaining defect.  The graft was trimmed to fit the size of the remaining defect.  The graft was then placed in the primary defect, oriented appropriately, and sutured into place.
Vermilion Border Text: The closure involved the vermilion border.
Where Do You Want The Question To Include Opioid Counseling Located?: Case Summary Tab
Hemigard Postcare Instructions: The HEMIGARD strips are to remain completely dry for at least 5-7 days.
Debridement Text: The wound edges were debrided prior to proceeding with the closure to facilitate wound healing.
Retention Suture Bite Size: 1 mm
Suturegard Intro: Intraoperative tissue expansion was performed, utilizing the SUTUREGARD device, in order to reduce wound tension.
Undermining Type: Entire Wound
Hemigard Intro: Due to skin fragility and wound tension, it was decided to use HEMIGARD adhesive retention suture devices to permit a linear closure. The skin was cleaned and dried for a 6cm distance away from the wound. Excessive hair, if present, was removed to allow for adhesion.
Helical Rim Text: The closure involved the helical rim.
Nostril Rim Text: The closure involved the nostril rim.

## 2020-07-08 RX ORDER — LOSARTAN POTASSIUM 50 MG/1
50 TABLET ORAL 2 TIMES DAILY
Qty: 180 TAB | Refills: 3 | Status: SHIPPED | OUTPATIENT
Start: 2020-07-08 | End: 2021-05-19

## 2020-07-08 NOTE — PATIENT COMMUNICATION
Per [facility] last OV notes:     3. Essential hypertension, benign  Now poorly controlled.   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   -Continue losartan 50mg PO bid  -if BP remains >130/85 in 1 week, would start norvasc 5mg PO daily        Prescription updated and sent to [facility]'s pharmacy

## 2020-07-14 ENCOUNTER — APPOINTMENT (RX ONLY)
Dept: URBAN - METROPOLITAN AREA CLINIC 35 | Facility: CLINIC | Age: 75
Setting detail: DERMATOLOGY
End: 2020-07-14

## 2020-07-14 DIAGNOSIS — D22 MELANOCYTIC NEVI: ICD-10-CM

## 2020-07-14 DIAGNOSIS — L20.89 OTHER ATOPIC DERMATITIS: ICD-10-CM

## 2020-07-14 DIAGNOSIS — Z85.828 PERSONAL HISTORY OF OTHER MALIGNANT NEOPLASM OF SKIN: ICD-10-CM

## 2020-07-14 DIAGNOSIS — L82.1 OTHER SEBORRHEIC KERATOSIS: ICD-10-CM

## 2020-07-14 DIAGNOSIS — Z87.2 PERSONAL HISTORY OF DISEASES OF THE SKIN AND SUBCUTANEOUS TISSUE: ICD-10-CM

## 2020-07-14 DIAGNOSIS — L81.4 OTHER MELANIN HYPERPIGMENTATION: ICD-10-CM

## 2020-07-14 DIAGNOSIS — Z71.89 OTHER SPECIFIED COUNSELING: ICD-10-CM

## 2020-07-14 DIAGNOSIS — L57.0 ACTINIC KERATOSIS: ICD-10-CM

## 2020-07-14 PROBLEM — D23.5 OTHER BENIGN NEOPLASM OF SKIN OF TRUNK: Status: ACTIVE | Noted: 2020-07-14

## 2020-07-14 PROBLEM — D22.5 MELANOCYTIC NEVI OF TRUNK: Status: ACTIVE | Noted: 2020-07-14

## 2020-07-14 PROBLEM — L20.84 INTRINSIC (ALLERGIC) ECZEMA: Status: ACTIVE | Noted: 2020-07-14

## 2020-07-14 PROCEDURE — 17000 DESTRUCT PREMALG LESION: CPT | Mod: 79

## 2020-07-14 PROCEDURE — ? PRESCRIPTION

## 2020-07-14 PROCEDURE — 17003 DESTRUCT PREMALG LES 2-14: CPT | Mod: 79

## 2020-07-14 PROCEDURE — 99213 OFFICE O/P EST LOW 20 MIN: CPT | Mod: 24,25

## 2020-07-14 PROCEDURE — ? LIQUID NITROGEN

## 2020-07-14 PROCEDURE — ? COUNSELING

## 2020-07-14 PROCEDURE — ? TREATMENT REGIMEN

## 2020-07-14 RX ORDER — TRIAMCINOLONE ACETONIDE 1 MG/G
THIN LAYER CREAM TOPICAL BID
Qty: 1 | Refills: 1 | Status: ERX | COMMUNITY
Start: 2020-07-14

## 2020-07-14 RX ADMIN — TRIAMCINOLONE ACETONIDE THIN LAYER: 1 CREAM TOPICAL at 00:00

## 2020-07-14 ASSESSMENT — LOCATION SIMPLE DESCRIPTION DERM
LOCATION SIMPLE: RIGHT FOREARM
LOCATION SIMPLE: LEFT FOREHEAD
LOCATION SIMPLE: RIGHT SHOULDER
LOCATION SIMPLE: RIGHT WRIST
LOCATION SIMPLE: LEFT UPPER BACK
LOCATION SIMPLE: RIGHT FOREHEAD
LOCATION SIMPLE: LEFT CHEEK
LOCATION SIMPLE: RIGHT UPPER BACK
LOCATION SIMPLE: SUPERIOR FOREHEAD
LOCATION SIMPLE: LEFT FOREARM
LOCATION SIMPLE: ABDOMEN
LOCATION SIMPLE: LEFT SHOULDER
LOCATION SIMPLE: SCALP
LOCATION SIMPLE: LEFT EAR

## 2020-07-14 ASSESSMENT — LOCATION DETAILED DESCRIPTION DERM
LOCATION DETAILED: RIGHT INFERIOR UPPER BACK
LOCATION DETAILED: LEFT SUPERIOR HELIX
LOCATION DETAILED: LEFT DISTAL DORSAL FOREARM
LOCATION DETAILED: RIGHT MEDIAL UPPER BACK
LOCATION DETAILED: RIGHT PROXIMAL RADIAL DORSAL FOREARM
LOCATION DETAILED: LEFT DISTAL RADIAL DORSAL FOREARM
LOCATION DETAILED: LEFT INFERIOR LATERAL FOREHEAD
LOCATION DETAILED: PERIUMBILICAL SKIN
LOCATION DETAILED: RIGHT SUPERIOR FOREHEAD
LOCATION DETAILED: SUPERIOR MID FOREHEAD
LOCATION DETAILED: LEFT SUPERIOR PARIETAL SCALP
LOCATION DETAILED: LEFT POSTERIOR SHOULDER
LOCATION DETAILED: RIGHT PROXIMAL DORSAL FOREARM
LOCATION DETAILED: LEFT CENTRAL MANDIBULAR CHEEK
LOCATION DETAILED: LEFT SUPERIOR MEDIAL UPPER BACK
LOCATION DETAILED: RIGHT POSTERIOR SHOULDER
LOCATION DETAILED: LEFT ANTIHELIX
LOCATION DETAILED: RIGHT DISTAL DORSAL FOREARM
LOCATION DETAILED: RIGHT CENTRAL PARIETAL SCALP
LOCATION DETAILED: RIGHT DORSAL WRIST

## 2020-07-14 ASSESSMENT — LOCATION ZONE DERM
LOCATION ZONE: SCALP
LOCATION ZONE: ARM
LOCATION ZONE: TRUNK
LOCATION ZONE: EAR
LOCATION ZONE: FACE

## 2020-07-14 NOTE — PROCEDURE: TREATMENT REGIMEN
Detail Level: Zone
Initiate Treatment: Triamcinolone 0.1% cream bid for 2 weeks alternating with 2 weeks off

## 2020-07-20 ENCOUNTER — APPOINTMENT (RX ONLY)
Dept: URBAN - METROPOLITAN AREA CLINIC 35 | Facility: CLINIC | Age: 75
Setting detail: DERMATOLOGY
End: 2020-07-20

## 2020-07-20 DIAGNOSIS — Z48.02 ENCOUNTER FOR REMOVAL OF SUTURES: ICD-10-CM

## 2020-07-20 PROCEDURE — ? SUTURE REMOVAL (GLOBAL PERIOD)

## 2020-07-20 ASSESSMENT — LOCATION DETAILED DESCRIPTION DERM: LOCATION DETAILED: LEFT DISTAL DORSAL FOREARM

## 2020-07-20 ASSESSMENT — LOCATION ZONE DERM: LOCATION ZONE: ARM

## 2020-07-20 ASSESSMENT — LOCATION SIMPLE DESCRIPTION DERM: LOCATION SIMPLE: LEFT FOREARM

## 2020-07-20 NOTE — PROCEDURE: SUTURE REMOVAL (GLOBAL PERIOD)
Detail Level: Detailed
Add 49814 Cpt? (Important Note: In 2017 The Use Of 13858 Is Being Tracked By Cms To Determine Future Global Period Reimbursement For Global Periods): no

## 2020-07-23 ENCOUNTER — NURSE TRIAGE (OUTPATIENT)
Dept: HEALTH INFORMATION MANAGEMENT | Facility: OTHER | Age: 75
End: 2020-07-23

## 2020-07-23 ENCOUNTER — APPOINTMENT (OUTPATIENT)
Dept: RADIOLOGY | Facility: IMAGING CENTER | Age: 75
End: 2020-07-23
Attending: FAMILY MEDICINE
Payer: MEDICARE

## 2020-07-23 ENCOUNTER — OFFICE VISIT (OUTPATIENT)
Dept: URGENT CARE | Facility: CLINIC | Age: 75
End: 2020-07-23
Payer: MEDICARE

## 2020-07-23 VITALS
HEIGHT: 72 IN | SYSTOLIC BLOOD PRESSURE: 130 MMHG | RESPIRATION RATE: 16 BRPM | BODY MASS INDEX: 25.6 KG/M2 | HEART RATE: 68 BPM | OXYGEN SATURATION: 98 % | TEMPERATURE: 97 F | WEIGHT: 189 LBS | DIASTOLIC BLOOD PRESSURE: 68 MMHG

## 2020-07-23 DIAGNOSIS — R07.81 PLEURITIC CHEST PAIN: ICD-10-CM

## 2020-07-23 PROCEDURE — 71046 X-RAY EXAM CHEST 2 VIEWS: CPT | Mod: TC,FY | Performed by: FAMILY MEDICINE

## 2020-07-23 PROCEDURE — 99214 OFFICE O/P EST MOD 30 MIN: CPT | Performed by: FAMILY MEDICINE

## 2020-07-23 ASSESSMENT — ENCOUNTER SYMPTOMS
WEIGHT LOSS: 0
VOMITING: 0
EYE DISCHARGE: 0
NAUSEA: 0
MYALGIAS: 0
EYE REDNESS: 0

## 2020-07-23 ASSESSMENT — FIBROSIS 4 INDEX: FIB4 SCORE: 2.4

## 2020-07-23 NOTE — TELEPHONE ENCOUNTER
"  Reason for Disposition  • Chest pain lasting longer than 5 minutes    Additional Information  • Negative: SEVERE difficulty breathing (e.g., struggling for each breath, speaks in single words)  • Negative: Passed out (i.e., fainted, collapsed and was not responding)  • Negative: Chest pain lasting longer than 5 minutes and ANY of the following:* Over 50 years old* Over 30 years old and at least one cardiac risk factor (i.e., high blood pressure, diabetes, high cholesterol, obesity, smoker or strong family history of heart disease)* Pain is crushing, pressure-like, or heavy * Took nitroglycerin and chest pain was not relieved* History of heart disease (i.e., angina, heart attack, bypass surgery, angioplasty, CHF)  • Negative: Visible sweat on face or sweat dripping down face  • Negative: Sounds like a life-threatening emergency to the triager    Answer Assessment - Initial Assessment Questions  1. LOCATION: \"Where does it hurt?\"        Right sided  2. RADIATION: \"Does the pain go anywhere else?\" (e.g., into neck, jaw, arms, back)      To rt. side  3. ONSET: \"When did the chest pain begin?\" (Minutes, hours or days)       Started Lusi night  4. PATTERN \"Does the pain come and go, or has it been constant since it started?\"  \"Does it get worse with exertion?\"       Constant, worse with deep breath  5. DURATION: \"How long does it last\" (e.g., seconds, minutes, hours)      Constantly there  6. SEVERITY: \"How bad is the pain?\"  (e.g., Scale 1-10; mild, moderate, or severe)     - MILD (1-3): doesn't interfere with normal activities      - MODERATE (4-7): interferes with normal activities or awakens from sleep     - SEVERE (8-10): excruciating pain, unable to do any normal activities        moderate  7. CARDIAC RISK FACTORS: \"Do you have any history of heart problems or risk factors for heart disease?\" (e.g., prior heart attack, angina; high blood pressure, diabetes, being overweight, high cholesterol, smoking, or strong " "family history of heart disease)      CAD, overweight, DM, high cholestrol2  8. PULMONARY RISK FACTORS: \"Do you have any history of lung disease?\"  (e.g., blood clots in lung, asthma, emphysema, birth control pills)      no  9. CAUSE: \"What do you think is causing the chest pain?\"      unknown  10. OTHER SYMPTOMS: \"Do you have any other symptoms?\" (e.g., dizziness, nausea, vomiting, sweating, fever, difficulty breathing, cough)        no  11. PREGNANCY: \"Is there any chance you are pregnant?\" \"When was your last menstrual period?\"        n/a    Protocols used: CHEST PAIN-A-[place]    "

## 2020-07-23 NOTE — TELEPHONE ENCOUNTER
Hi Dr. Tucker/Cardiology team--    This patient called into the nurse triage line (or his wife did) for right sided chest pain. He has a history of CAD and multiple co-morbidities. Could you get him in for evaluation today or tomorrow or should we have him go to urgent care or ED for evaluation? I am full this afternoon and Friday is my day off.     Thanks for your help!    Marylou Bunch  Geriatrics

## 2020-07-23 NOTE — PROGRESS NOTES
Subjective:      Av Bob is a 75 y.o. male who presents with Rib Pain (hurts to breath in, lower left side x3 days )            Onset 3 days ago aching right anterior chest pain. Woke him from sleep. Worse with movement and deep inspiration. 3/10 severity. No radiation. No SOB. No limb swelling. No palpitations. Significant PMH cardiac and he does not feel this to be the case.  No trauma or clear trigger but works out with  twice weekly. No rash. No change with eating. Some improvement with OTC tylenol and symptoms are progressively improving. No other aggravating or alleviating factors.        Review of Systems   Constitutional: Negative for malaise/fatigue and weight loss.   Eyes: Negative for discharge and redness.   Gastrointestinal: Negative for nausea and vomiting.   Musculoskeletal: Negative for joint pain and myalgias.   Skin: Negative for itching and rash.     .  Medications, Allergies, and current problem list reviewed today in Epic       Objective:     /68   Pulse 68   Temp 36.1 °C (97 °F) (Temporal)   Resp 16   Ht 1.829 m (6')   Wt 85.7 kg (189 lb)   SpO2 98%   BMI 25.63 kg/m²      Physical Exam  Constitutional:       General: He is not in acute distress.     Appearance: He is well-developed.   HENT:      Head: Normocephalic and atraumatic.   Eyes:      Conjunctiva/sclera: Conjunctivae normal.   Cardiovascular:      Rate and Rhythm: Normal rate and regular rhythm.      Heart sounds: Normal heart sounds. No murmur.   Pulmonary:      Effort: Pulmonary effort is normal.      Breath sounds: Normal breath sounds. No wheezing.   Abdominal:      Palpations: Abdomen is soft.      Tenderness: There is no abdominal tenderness.       Skin:     General: Skin is warm and dry.      Findings: No rash.   Neurological:      Mental Status: He is alert and oriented to person, place, and time.                 Assessment/Plan:     CXR: no acute cardiopulmonary process per radiology    1.  Pleuritic chest pain  DX-CHEST-2 VIEWS     Differential diagnosis, natural history, supportive care, and indications for immediate follow-up discussed at length.     History and exam are not consistent with cardiac etiology however patient is relatively high risk.  Discussed further work-up and hospital setting and shared decision to hold this at this for now.  Patient and family understand the risk of this.

## 2020-07-23 NOTE — TELEPHONE ENCOUNTER
Regarding: symptoms chest pain   ----- Message from Kia Castañeda sent at 7/23/2020  9:41 AM PDT -----  Wife is calling concern for her  past 5 days he has been having chest pain/right side

## 2020-07-24 NOTE — TELEPHONE ENCOUNTER
Colleen, when is next available [facility] appointment? Can we get him in asap next week and give him a call today to check in on him? Thanks!

## 2020-07-27 DIAGNOSIS — F33.41 RECURRENT MAJOR DEPRESSIVE DISORDER, IN PARTIAL REMISSION (HCC): ICD-10-CM

## 2020-07-27 RX ORDER — FLUOXETINE HYDROCHLORIDE 40 MG/1
40 CAPSULE ORAL DAILY
Qty: 90 CAP | Refills: 3 | Status: SHIPPED | OUTPATIENT
Start: 2020-07-27 | End: 2021-07-15

## 2020-07-31 DIAGNOSIS — Z79.4 TYPE 2 DIABETES MELLITUS WITH HYPERGLYCEMIA, WITH LONG-TERM CURRENT USE OF INSULIN (HCC): ICD-10-CM

## 2020-07-31 DIAGNOSIS — E11.65 TYPE 2 DIABETES MELLITUS WITH HYPERGLYCEMIA, WITH LONG-TERM CURRENT USE OF INSULIN (HCC): ICD-10-CM

## 2020-07-31 RX ORDER — PEN NEEDLE, DIABETIC 32GX 5/32"
NEEDLE, DISPOSABLE MISCELLANEOUS
Qty: 500 EACH | Refills: 3 | Status: SHIPPED | OUTPATIENT
Start: 2020-07-31 | End: 2021-10-18 | Stop reason: SDUPTHER

## 2020-07-31 NOTE — TELEPHONE ENCOUNTER
Received request via: Patient    Was the patient seen in the last year in this department? Yes    Does the patient have an active prescription (recently filled or refills available) for medication(s) requested? No    BD PEN NEEDLE SWATI U/F

## 2020-08-01 DIAGNOSIS — I25.10 CORONARY ARTERY DISEASE INVOLVING NATIVE CORONARY ARTERY OF NATIVE HEART WITHOUT ANGINA PECTORIS: Primary | ICD-10-CM

## 2020-08-03 RX ORDER — CLOPIDOGREL BISULFATE 75 MG/1
TABLET ORAL
Qty: 90 TAB | Refills: 1 | Status: SHIPPED | OUTPATIENT
Start: 2020-08-03 | End: 2021-02-05 | Stop reason: SDUPTHER

## 2020-08-16 NOTE — PROGRESS NOTES
Endocrinology Clinic Progress Note  PCP: Madison Bunch D.O.    HPI:  Eligio Madrigal is a 75 y.o. old patient who comes in today for routine follow up of of Management of Uncontrolled Type 2 Diabetes with stage 3 chronic kidney disease, Hypertension, Hyperlipidemia, and Vitamin D Deficiency.      Vitamin D Deficiency: Currently on Vitamin D 4000 iu BID and multivitamin. Results for ELIGIO MADRIGAL (MRN [id number]) as of 8/16/2020 16:59   Ref. Range 5/7/2020 12:13   25-Hydroxy   Vitamin D 25 Latest Ref Range: 30 - 100 ng/mL 78     Hypertension:  Controlled with Losartan 50 mg per day and Metoprolol XL 200 mg daily.     Hyperlipidemia:  Currently taking Atorvastatin 80 mg per day.   Results for ELIGIO MADRIGAL (MRN [id number]) as of 8/16/2020 16:59   Ref. Range 5/7/2020 12:12   Cholesterol,Tot Latest Ref Range: 100 - 199 mg/dL 120   Triglycerides Latest Ref Range: 0 - 149 mg/dL 84   HDL Latest Ref Range: >=40 mg/dL 30 (A)   LDL Latest Ref Range: <100 mg/dL 73         HPI:  Eligio Madrigal is a 75 y.o. old patient who comes in today for evaluation of above stated problem.    Most Recent HbA1c:   Lab Results   Component Value Date/Time    HBA1C 5.8 (H) 06/19/2020 08:14 AM        Current Diabetes Regimen:  GLP-1 Agent: Dulaglutide 1.5 mg once weekly   SGLT-2 Inhibitor:  Farxiga stopped due to yeast infection  Basal Insulin: Toujeo 32 units daily.  Prandial Insulin: Humalog base of 5 units plus 2:50>150 correction.    Testing blood sugars 5 times daily for the last 90 days due to hypoglycemia and insulin adjustment.    See blood sugar log scanned into media.     has had some low in the morning in the 50's.    ROS:  Constitutional: fatigue, No weight loss  Cardiac: No palpitations or racing heart  Resp: No shortness of breath  Neuro: No numbness or tinging in feet  Endo: No heat or cold intolerance, no polyuria or polydipsia  All other systems were reviewed and were negative.    Past  Medical History:  Patient Active Problem List    Diagnosis Date Noted   • Hydronephrosis 01/20/2020     Priority: Medium   • Paroxysmal atrial fibrillation (HCC) 05/23/2017     Priority: Medium   • (HCC) Hypertension associated with diabetes 03/22/2017     Priority: Medium   • H/O Cerebrovascular accident (CVA) in adulthood 12/30/2016     Priority: Medium   • Coronary artery disease involving native coronary artery 12/30/2016     Priority: Medium   • (HCC) Hyperlipidemia associated with type 2 diabetes mellitus 12/13/2011     Priority: Medium   • GERD with esophagitis 10/19/2018     Priority: Low   • Recurrent UTI 04/26/2018     Priority: Low   • (HCC) Left hemiparesis 12/27/2017     Priority: Low   • (HCC) Diabetic nephropathy associated with type 2 diabetes mellitus 11/30/2017     Priority: Low   • Psychophysiological insomnia 11/21/2017     Priority: Low   • (HCC) Moderate episode of recurrent major depressive disorder 11/07/2017     Priority: Low   • Wheelchair dependent 11/07/2017     Priority: Low   • H/O non-Hodgkin's lymphoma 05/08/2017     Priority: Low   • Type 2 diabetes mellitus, with long-term current use of insulin ([facility]) 12/30/2016     Priority: Low   • Stage 3 chronic kidney disease ([facility]) 08/25/2016     Priority: Low   • Idiopathic chronic gout of multiple sites without tophus 01/28/2014     Priority: Low   • Low testosterone in male 07/06/2020   • Nocturnal hypoxia 07/06/2020   • Chronic fatigue 06/09/2020   • Essential hypertension, benign 05/06/2020   • Polypharmacy 02/04/2020   • Renal cyst 01/29/2020   • Benign prostatic hyperplasia with urinary obstruction 01/29/2020   • Altered mobility due to old stroke 01/22/2020   • Normocytic anemia 01/21/2020   • History of renal calculi 11/19/2019   • Neurogenic bladder 11/19/2019   • PVD (peripheral vascular disease) ([facility]) 10/08/2019   • Strain of flexor muscle of right hip 05/20/2019   • Muscle strain of right gluteal region 05/20/2019   • Left hand  weakness 05/20/2019   • Urinary incontinence 02/19/2019   • (HCC) Tibial artery disease 12/27/2018       Past Surgical History:  Past Surgical History:   Procedure Laterality Date   • CYSTOSCOPY STENT PLACEMENT Left 2/12/2018    Procedure: CYSTOSCOPY  ;  Surgeon: Rey Barry M.D.;  Location: SURGERY [facility];  Service: Urology   • URETEROSCOPY Left 2/12/2018    Procedure: URETEROSCOPY- FLEXIBLE  ;  Surgeon: Rey Barry M.D.;  Location: SURGERY [facility];  Service: Urology   • LASERTRIPSY Left 2/12/2018    Procedure: LASERTRIPSY - LITHO  ;  Surgeon: Rey Barry M.D.;  Location: SURGERY [facility];  Service: Urology   • WOUND CLOSURE GENERAL  4/3/2012    Performed by GERHARD GILBERT at SURGERY SAME DAY [facility]   • ZZZ CARDIAC CATH  2009    Stents to LAD, Om   • DAGOBERTO BY LAPAROSCOPY  1998   • ANGIOPLASTY  1997    RCA followed by other stents as noted above.    • ZZZ CARDIAC CATH  1997    stent RCA   • CATARACT EXTRACTION WITH IOL      bilateral   • LITHOTRIPSY     • TONSILLECTOMY AND ADENOIDECTOMY         Allergies:  Diphenhydramine hcl, Lorazepam, Ciprofloxacin, and Spironolactone    Social History:  Social History     Socioeconomic History   • Marital status:      Spouse name: Not on file   • Number of children: Not on file   • Years of education: Not on file   • Highest education level: Not on file   Occupational History   • Not on file   Social Needs   • Financial resource strain: Not on file   • Food insecurity     Worry: Not on file     Inability: Not on file   • Transportation needs     Medical: Not on file     Non-medical: Not on file   Tobacco Use   • Smoking status: Never Smoker   • Smokeless tobacco: Never Used   • Tobacco comment: continued abstinence   Substance and Sexual Activity   • Alcohol use: No   • Drug use: No   • Sexual activity: Not Currently     Partners: Female     Comment: , one daughter, 2 grands   Lifestyle   • Physical activity     Days per week:  Not on file     Minutes per session: Not on file   • Stress: Not on file   Relationships   • Social connections     Talks on phone: Not on file     Gets together: Not on file     Attends [employer] service: Not on file     Active member of club or organization: Not on file     Attends meetings of clubs or organizations: Not on file     Relationship status: Not on file   • Intimate partner violence     Fear of current or ex partner: Not on file     Emotionally abused: Not on file     Physically abused: Not on file     Forced sexual activity: Not on file   Other Topics Concern   • Not on file   Social History Narrative    Av is from [place], [place] and raised in [place]. He has been in [place] since 2004. 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  He has been  to Usha since 2003, they met through a mutual friend. He has a daughter (Kalina) and a step son (Jimi).       Family History:  Family History   Problem Relation Age of Onset   • Heart Disease Father         CAD   • Diabetes Father    • Cancer Mother    • Psychiatric Illness Mother         Depression   • Depression Mother    • Kidney stones Brother    • Heart Disease Brother    • Psychiatric Illness Brother         Depression   • Depression Brother    • Suicide Attempts Other    • Psychiatric Illness Other         autism       Medications:    Current Outpatient Medications:   •  allopurinol (ZYLOPRIM) 100 MG Tab, TAKE 1 TABLET BY MOUTH EVERY DAY, Disp: 90 Tab, Rfl: 0  •  clopidogrel (PLAVIX) 75 MG Tab, TAKE 1 TABLET BY MOUTH EVERY DAY, Disp: 90 Tab, Rfl: 1  •  Insulin Pen Needle 32 G x 4 mm (BD PEN NEEDLE SWATI U/F), USE FOR INSULIN SHOTS FIVE TIMES DAILY, Disp: 500 Each, Rfl: 3  •  fluoxetine (PROZAC) 40 MG capsule, Take 1  Cap by mouth every day., Disp: 90 Cap, Rfl: 3  •  losartan (COZAAR) 50 MG Tab, Take 1 Tab by mouth 2 Times a Day., Disp: 180 Tab, Rfl: 3  •  TRULICITY 1.5 MG/0.5ML Solution Pen-injector, INJECT CONTENTS OF 1 SYRINGE SUBCUTANEOUSLY EVERY 7 DAYS ON TUESDAY, Disp: 6 mL, Rfl: 1  •  metoprolol SR (TOPROL XL) 100 MG TABLET SR 24 HR, Take 1 Tab by mouth every day., Disp: 90 Tab, Rfl: 3  •  amLODIPine (NORVASC) 5 MG Tab, Take 1 Tab by mouth every day., Disp: 90 Tab, Rfl: 3  •  Multiple Vitamin (MULTI VITAMIN MENS PO), Take  by mouth., Disp: , Rfl:   •  potassium chloride (KLOR-CON) 8 MEQ tablet, TAKE 1 TABLET BY MOUTH EVERY DAY, Disp: 90 Tab, Rfl: 3  •  ONE TOUCH ULTRA TEST strip, USE TO TEST FIVE TIMES DAILY, Disp: 600 Strip, Rfl: 1  •  aspirin EC (ECOTRIN) 81 MG Tablet Delayed Response, Take 81 mg by mouth every day., Disp: , Rfl:   •  Probiotic Product (PROBIOTIC DAILY PO), Take 1 Cap by mouth 2 Times a Day., Disp: , Rfl:   •  magnesium oxide (MAG-OX) 400 MG Tab, Take 400 mg by mouth every day., Disp: , Rfl:   •  finasteride (PROSCAR) 5 MG Tab, Take 1 Tab by mouth every day., Disp: 30 Tab, Rfl: 0  •  atorvastatin (LIPITOR) 80 MG tablet, Take 80 mg by mouth every evening., Disp: , Rfl:   •  fenofibrate (TRICOR) 145 MG Tab, Take 145 mg by mouth every evening., Disp: , Rfl:   •  alfuzosin (UROXATRAL) 10 MG SR tablet, Take 10 mg by mouth every day., Disp: , Rfl:   •  methenamine hip (HIPPREX) 1 GM Tab, Take 1 g by mouth 2 times a day., Disp: , Rfl:   •  Insulin Glargine (TOUJEO MAX SOLOSTAR) 300 UNIT/ML Solution Pen-injector, Inject 32 Units as instructed every bedtime., Disp: , Rfl:   •  insulin lispro, Human, (HUMALOG KWIKPEN) 100 UNIT/ML Solution Pen-injector injection, Inject 5-9 Units as instructed 3 times a day before meals. Sliding Scale, Disp: , Rfl:   •  acetaminophen (TYLENOL) 500 MG Tab, Take 1,000 mg by mouth every 6 hours as needed for Mild Pain or Moderate Pain., Disp: , Rfl:   •  Cholecalciferol (VITAMIN D)  2000 units Cap, Take 4,000 Units by mouth 2 Times a Day., Disp: , Rfl:     Labs: Reviewed    Physical Examination:  Vital signs: /64   Pulse 63   Ht 1.829 m (6')   Wt 84.4 kg (186 lb)   SpO2 99%   BMI 25.23 kg/m²  Body mass index is 25.23 kg/m².  General: No apparent distress, cooperative  Eyes: No scleral icterus or discharge  ENMT: Normal on external inspection of nose, lips  Neck: No abnormal masses on inspection  Resp: Normal effort, clear to auscultation bilaterally   CVS: Regular rate and rhythm, S1 S2 normal, no murmur   Extremities: No edema, wheelchair-bound  Abdomen: abdominal obesity present  Neuro: Alert and oriented  Skin: No rash  Psych: Normal mood and affect, intact memory and able to make informed decisions    Foot Exam:  Monofilament: done  Monofilament testing with a 10 gram force: sensation intact: decreased bilaterally  Visual Inspection: Feet without maceration, ulcers, fissures.  Pedal pulses: decreased bilaterally    Assessment and Plan:    1. Type 2 diabetes mellitus with hyperglycemia, with long-term current use of insulin (HCC)  -Continue current regimen.  Advised to decrease the dose of Toujeo by 2 to 4 units to avoid hypoglycemia in the morning.  Did not tolerate SGLT2 inhibitor due to recurrent yeast infections and therefore is not on it.  Discussed diabetic diet discussed in detail-plate method.  - He will test before meals and log.  - He is going to physical therapy for 2 days/week.  - Reviewed medications and advised how to take.  - Discussed importance of immunizations and yearly eye exams. He saw Dr. Cox last week.  - Advised daily foot exams. Educated on signs of infection.   - Educated on need to stay well hydrated with water.  - Educated to call with any questions or problems.    2. Essential hypertension  Controlled.     3. Mixed hyperlipidemia  Continue statin.     4. Vitamin D deficiency  Cont vit D with food as per HPI    Return in about 3 months (around  11/18/2020).      Thank you for allowing me to participate in the care of this patient.    Dr. Fran Rasheed      CC:   Madison Bunch D.O.    This note was created using voice recognition software (Dragon). The accuracy of the dictation is limited by the abilities of the software. I have reviewed the note prior to signing, however some errors in grammar and context are still possible. If you have any questions related to this note please do not hesitate to contact our office.

## 2020-08-17 ENCOUNTER — HOSPITAL ENCOUNTER (OUTPATIENT)
Dept: LAB | Facility: MEDICAL CENTER | Age: 75
End: 2020-08-17
Attending: INTERNAL MEDICINE
Payer: MEDICARE

## 2020-08-17 DIAGNOSIS — I10 ESSENTIAL HYPERTENSION: ICD-10-CM

## 2020-08-17 DIAGNOSIS — D64.9 ANEMIA, UNSPECIFIED TYPE: ICD-10-CM

## 2020-08-17 DIAGNOSIS — N18.30 CKD (CHRONIC KIDNEY DISEASE), STAGE III: ICD-10-CM

## 2020-08-17 DIAGNOSIS — N39.0 RECURRENT UTI: ICD-10-CM

## 2020-08-17 LAB
ANION GAP SERPL CALC-SCNC: 13 MMOL/L (ref 7–16)
APPEARANCE UR: ABNORMAL
BACTERIA #/AREA URNS HPF: ABNORMAL /HPF
BILIRUB UR QL STRIP.AUTO: NEGATIVE
BUN SERPL-MCNC: 31 MG/DL (ref 8–22)
CALCIUM SERPL-MCNC: 8.9 MG/DL (ref 8.5–10.5)
CHLORIDE SERPL-SCNC: 103 MMOL/L (ref 96–112)
CO2 SERPL-SCNC: 22 MMOL/L (ref 20–33)
COLOR UR: YELLOW
CREAT SERPL-MCNC: 1.44 MG/DL (ref 0.5–1.4)
EPI CELLS #/AREA URNS HPF: NEGATIVE /HPF
ERYTHROCYTE [DISTWIDTH] IN BLOOD BY AUTOMATED COUNT: 49.1 FL (ref 35.9–50)
GLUCOSE SERPL-MCNC: 91 MG/DL (ref 65–99)
GLUCOSE UR STRIP.AUTO-MCNC: NEGATIVE MG/DL
HCT VFR BLD AUTO: 37.1 % (ref 42–52)
HGB BLD-MCNC: 12 G/DL (ref 14–18)
HYALINE CASTS #/AREA URNS LPF: ABNORMAL /LPF
KETONES UR STRIP.AUTO-MCNC: NEGATIVE MG/DL
LEUKOCYTE ESTERASE UR QL STRIP.AUTO: ABNORMAL
MCH RBC QN AUTO: 28.6 PG (ref 27–33)
MCHC RBC AUTO-ENTMCNC: 32.3 G/DL (ref 33.7–35.3)
MCV RBC AUTO: 88.5 FL (ref 81.4–97.8)
MICRO URNS: ABNORMAL
NITRITE UR QL STRIP.AUTO: POSITIVE
PH UR STRIP.AUTO: 6.5 [PH] (ref 5–8)
PLATELET # BLD AUTO: 275 K/UL (ref 164–446)
PMV BLD AUTO: 10.2 FL (ref 9–12.9)
POTASSIUM SERPL-SCNC: 3.7 MMOL/L (ref 3.6–5.5)
PROT UR QL STRIP: NEGATIVE MG/DL
RBC # BLD AUTO: 4.19 M/UL (ref 4.7–6.1)
RBC # URNS HPF: ABNORMAL /HPF
RBC UR QL AUTO: ABNORMAL
SODIUM SERPL-SCNC: 138 MMOL/L (ref 135–145)
SP GR UR STRIP.AUTO: 1.02
UROBILINOGEN UR STRIP.AUTO-MCNC: 0.2 MG/DL
WBC # BLD AUTO: 9.5 K/UL (ref 4.8–10.8)
WBC #/AREA URNS HPF: ABNORMAL /HPF

## 2020-08-17 PROCEDURE — 36415 COLL VENOUS BLD VENIPUNCTURE: CPT

## 2020-08-17 PROCEDURE — 81001 URINALYSIS AUTO W/SCOPE: CPT

## 2020-08-17 PROCEDURE — 85027 COMPLETE CBC AUTOMATED: CPT

## 2020-08-17 PROCEDURE — 80048 BASIC METABOLIC PNL TOTAL CA: CPT

## 2020-08-17 RX ORDER — ALLOPURINOL 100 MG/1
TABLET ORAL
Qty: 90 TAB | Refills: 0 | Status: SHIPPED | OUTPATIENT
Start: 2020-08-17 | End: 2021-05-03

## 2020-08-17 NOTE — TELEPHONE ENCOUNTER
Received request via: Pharmacy    Was the patient seen in the last year in this department? Yes    Does the patient have an active prescription (recently filled or refills available) for medication(s) requested? Unclear. Came in from [facility], appears to have last been written on 7/6

## 2020-08-18 ENCOUNTER — OFFICE VISIT (OUTPATIENT)
Dept: ENDOCRINOLOGY | Facility: MEDICAL CENTER | Age: 75
End: 2020-08-18
Attending: INTERNAL MEDICINE
Payer: MEDICARE

## 2020-08-18 VITALS
HEIGHT: 72 IN | HEART RATE: 63 BPM | SYSTOLIC BLOOD PRESSURE: 108 MMHG | WEIGHT: 186 LBS | OXYGEN SATURATION: 99 % | BODY MASS INDEX: 25.19 KG/M2 | DIASTOLIC BLOOD PRESSURE: 64 MMHG

## 2020-08-18 DIAGNOSIS — Z79.4 TYPE 2 DIABETES MELLITUS WITH HYPERGLYCEMIA, WITH LONG-TERM CURRENT USE OF INSULIN (HCC): ICD-10-CM

## 2020-08-18 DIAGNOSIS — I10 ESSENTIAL HYPERTENSION: ICD-10-CM

## 2020-08-18 DIAGNOSIS — E55.9 VITAMIN D DEFICIENCY: ICD-10-CM

## 2020-08-18 DIAGNOSIS — E78.2 MIXED HYPERLIPIDEMIA: ICD-10-CM

## 2020-08-18 DIAGNOSIS — E11.65 TYPE 2 DIABETES MELLITUS WITH HYPERGLYCEMIA, WITH LONG-TERM CURRENT USE OF INSULIN (HCC): ICD-10-CM

## 2020-08-18 PROCEDURE — 99212 OFFICE O/P EST SF 10 MIN: CPT | Performed by: INTERNAL MEDICINE

## 2020-08-18 PROCEDURE — 99214 OFFICE O/P EST MOD 30 MIN: CPT | Performed by: INTERNAL MEDICINE

## 2020-08-18 ASSESSMENT — FIBROSIS 4 INDEX: FIB4 SCORE: 2.73

## 2020-08-25 ENCOUNTER — OFFICE VISIT (OUTPATIENT)
Dept: NEPHROLOGY | Facility: MEDICAL CENTER | Age: 75
End: 2020-08-25
Payer: MEDICARE

## 2020-08-25 VITALS
DIASTOLIC BLOOD PRESSURE: 60 MMHG | TEMPERATURE: 97 F | BODY MASS INDEX: 25.23 KG/M2 | OXYGEN SATURATION: 99 % | HEART RATE: 66 BPM | RESPIRATION RATE: 16 BRPM | SYSTOLIC BLOOD PRESSURE: 112 MMHG | WEIGHT: 186 LBS

## 2020-08-25 DIAGNOSIS — E11.29 MICROALBUMINURIA DUE TO TYPE 2 DIABETES MELLITUS (HCC): ICD-10-CM

## 2020-08-25 DIAGNOSIS — N39.0 RECURRENT UTI: ICD-10-CM

## 2020-08-25 DIAGNOSIS — I10 ESSENTIAL HYPERTENSION: ICD-10-CM

## 2020-08-25 DIAGNOSIS — N18.30 CKD (CHRONIC KIDNEY DISEASE), STAGE III: ICD-10-CM

## 2020-08-25 DIAGNOSIS — E55.9 VITAMIN D DEFICIENCY: ICD-10-CM

## 2020-08-25 DIAGNOSIS — R33.9 URINARY RETENTION: ICD-10-CM

## 2020-08-25 DIAGNOSIS — D64.9 ANEMIA, UNSPECIFIED TYPE: ICD-10-CM

## 2020-08-25 DIAGNOSIS — R80.9 MICROALBUMINURIA DUE TO TYPE 2 DIABETES MELLITUS (HCC): ICD-10-CM

## 2020-08-25 PROCEDURE — 99214 OFFICE O/P EST MOD 30 MIN: CPT | Performed by: INTERNAL MEDICINE

## 2020-08-25 ASSESSMENT — ENCOUNTER SYMPTOMS
PALPITATIONS: 0
BACK PAIN: 1
NAUSEA: 0
ORTHOPNEA: 0
ABDOMINAL PAIN: 0
SHORTNESS OF BREATH: 0
HEMOPTYSIS: 0
MYALGIAS: 0
VOMITING: 0
NECK PAIN: 0
WHEEZING: 0
FLANK PAIN: 0
SINUS PAIN: 0
CHILLS: 0
COUGH: 0
EYES NEGATIVE: 1
FEVER: 0
WEIGHT LOSS: 0
SLEEP DISTURBANCE: 0

## 2020-08-25 ASSESSMENT — FIBROSIS 4 INDEX: FIB4 SCORE: 2.73

## 2020-08-25 NOTE — PROGRESS NOTES
Subjective:      Av Bob is a 75 y.o. male who presents with Follow-Up and Chronic Kidney Disease            Chronic Kidney Disease  Pertinent negatives include no abdominal pain, chest pain, chills, congestion, coughing, fever, myalgias, nausea, neck pain or vomiting.     Av is coming today for f/u of CKD III, microalbuminuria  recurrent UTI,   Doing well, no complaints  Creat level stable at 1.4 -baseline  No difficulties to urinate.No dysuria No flank pain  HTN: BP well controlled  Vit D and PTH well controlled  Anemia: Hb stable    Review of Systems   Constitutional: Negative for chills, fever, malaise/fatigue and weight loss.   HENT: Negative for congestion, hearing loss and sinus pain.    Eyes: Negative.    Respiratory: Negative for cough, hemoptysis, shortness of breath and wheezing.    Cardiovascular: Negative for chest pain, palpitations, orthopnea and leg swelling.   Gastrointestinal: Negative for abdominal pain, nausea and vomiting.   Genitourinary: Negative for dysuria, flank pain, frequency, hematuria and urgency.   Musculoskeletal: Positive for back pain. Negative for myalgias and neck pain.   Skin: Negative.    All other systems reviewed and are negative.    Past Medical History:   Diagnosis Date   • ([facility]) Chronic ulcer of right lower extremity with fat layer exposed 12/27/2018   • Acute on chronic renal failure ([facility]) 1/21/2020   • Acute respiratory failure with hypoxia ([facility]) 5/20/2017   • CAD (coronary artery disease)     GIOVANNA to RCA in '97, GIOVANNA X2 to LAD and GIOVANNA X2 to OM in '09   • Cancer ([facility])     2017; chemo lympoma   • Cataract    • Cerebrovascular accident (CVA) ([facility]) 12/30/2016    Left arm weakness  etiology of stroke not established, lymphoma discovered on MRI evaluation of stroke, L hemiparesis much worse after acute infectious illness in mid 2017, but no specific diagnosed recurrent neurological etiology, all at [facility]   • CKD (chronic  kidney disease) stage 3, GFR 30-59 ml/min ([facility])    • Controlled gout 2014   • Coronary atherosclerosis of native coronary artery     S/P PTCA (percutaneous transluminal coronary angioplasty), RCA, 5/1997, patent on cath 7/10/2009 at the time of interventions on his left anterior descending and circumflex coronary arteries   • Depression    • Diabetes ([facility])    • Enterococcal septicemia ([facility]) 8/12/2017   • Roberto hematuria 1/29/2020   • Hypertension    • Hypokalemia 2012    controlled with combination of ACE inhibitor or ARB plus spironolactone   • Hypomagnesemia 08/12/2017    etiology uncertain   • Influenza A 1/26/2020   • Leg edema, right 1/22/2020   • 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 Barry   • Normocytic hypochromic anemia 5/20/2017   • NSTEMI (non-ST elevated myocardial infarction) ([facility]) 07/18/2017    complicating UTI with sepsis   • Pain    • Polyneuropathy in diabetes(357.2) 9/11/2013   • Renal mass 1/21/2020   • Septic shock ([facility]) 5/20/2017   • Skin ulcer of calf ([facility]) 2015    Right, Dr. Terry and wound care   • Stroke ([facility]) 2016    left sided weakness   • Urinary bladder disorder    • Urinary incontinence    • Wound of left leg 2012    Requiring surgery and debridment, Dr. Moore       Family History   Problem Relation Age of Onset   • Heart Disease Father         CAD   • Diabetes Father    • Cancer Mother    • Psychiatric Illness Mother         Depression   • Depression Mother    • Kidney stones Brother    • Heart Disease Brother    • Psychiatric Illness Brother         Depression   • Depression Brother    • Suicide Attempts Other    • Psychiatric Illness Other         autism       Social History     Socioeconomic History   • Marital status:      Spouse name: Not on file   • Number of children: Not on file   • Years of education: Not on file   • Highest education level: Not on file   Occupational  History   • Not on file   Social Needs   • Financial resource strain: Not on file   • Food insecurity     Worry: Not on file     Inability: Not on file   • Transportation needs     Medical: Not on file     Non-medical: Not on file   Tobacco Use   • Smoking status: Never Smoker   • Smokeless tobacco: Never Used   • Tobacco comment: continued abstinence   Substance and Sexual Activity   • Alcohol use: No   • Drug use: No   • Sexual activity: Not Currently     Partners: Female     Comment: , one daughter, 2 grands   Lifestyle   • Physical activity     Days per week: Not on file     Minutes per session: Not on file   • Stress: Not on file   Relationships   • Social connections     Talks on phone: Not on file     Gets together: Not on file     Attends Faith service: Not on file     Active member of club or organization: Not on file     Attends meetings of clubs or organizations: Not on file     Relationship status: Not on file   • Intimate partner violence     Fear of current or ex partner: Not on file     Emotionally abused: Not on file     Physically abused: Not on file     Forced sexual activity: Not on file   Other Topics Concern   • Not on file   Social History Narrative    Av is from [place], [place] and raised in [place]. He has been in [place] since 2004. 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  He has been  to Usha since 2003, they met through a mutual friend. He has a daughter (Kalina) and a step son (Jimi).          Objective:     /60 (BP Location: Right arm, Patient Position: Sitting)   Pulse 66   Temp 36.1 °C (97 °F)   Resp 16   Wt 84.4 kg (186 lb)   SpO2 99%   BMI 25.23 kg/m²          Physical Exam  Vitals signs and nursing note reviewed.    Constitutional:       General: He is not in acute distress.     Appearance: Normal appearance. He is well-developed. He is not diaphoretic.   HENT:      Head: Normocephalic and atraumatic.      Nose: Nose normal.      Mouth/Throat:      Mouth: Mucous membranes are moist.      Pharynx: Oropharynx is clear.   Eyes:      Extraocular Movements: Extraocular movements intact.      Conjunctiva/sclera: Conjunctivae normal.      Pupils: Pupils are equal, round, and reactive to light.   Cardiovascular:      Rate and Rhythm: Normal rate and regular rhythm.      Pulses: Normal pulses.      Heart sounds: Normal heart sounds. No friction rub. No gallop.    Pulmonary:      Effort: Pulmonary effort is normal. No respiratory distress.      Breath sounds: Normal breath sounds. No wheezing, rhonchi or rales.   Abdominal:      General: Bowel sounds are normal. There is no distension.      Palpations: Abdomen is soft. There is no mass.      Tenderness: There is no abdominal tenderness. There is no right CVA tenderness, left CVA tenderness or guarding.   Musculoskeletal:      Right lower leg: No edema.      Left lower leg: No edema.   Skin:     General: Skin is warm.      Coloration: Skin is not jaundiced.      Findings: No erythema or rash.   Neurological:      General: No focal deficit present.      Mental Status: He is alert and oriented to person, place, and time.      Cranial Nerves: No cranial nerve deficit.      Coordination: Coordination normal.   Psychiatric:         Mood and Affect: Mood normal.         Behavior: Behavior normal.         Thought Content: Thought content normal.         Judgment: Judgment normal.            Laboratory results reviewed: d/w pt    Lab Results   Component Value Date/Time    CREATININE 1.44 (H) 08/17/2020 02:29 PM    CREATININE 1.4 05/28/2008 05:42 PM    POTASSIUM 3.7 08/17/2020 02:29 PM     Assessment and Plan    1.CKD III-creat stable at baseline -to monitor  2.HTN: BP well controlled -to  monitor  3.Electrolytes: well controlled.  4.Anemia: Hb level stable  5.Urinary retention/hydronephrosis -f/u with Urology  6.Volume:well controlled  7.Microalbuminuria due to DM II -improved to normal range -to monitor -on ARB  8.Vit D def: vit D and PTH well controlled -WNL    Recs: continue current treatment             Keep well hydrated             Low Na diet             F/u with Urology             Monitor BP             F/u here in 6 months with BMP

## 2020-08-26 NOTE — PROGRESS NOTES
"CC: \"Discussed sleep problems\"    HPI:  Av Bob is a 75 y.o. male kindly referred by Madison Bunch D.O. for evaluation of sleep problems.  He briefly describes his sleep problem as \"trouble falling asleep at night and staying asleep.  Often awake most of the night.  Sleep a lot during the day.\".  This results in him \"always tired and fatigued.  Lack energy.  Probably came worse after stroke and cancer and septic shock in 2017.\"    The patient describes a long sleep latency and reports that sometimes he is awake most of the night.  He leaves his TV on at nighttime.  He is incontinent and wears disposable briefs.  He generally sleeps better in the early morning.  He reports sleepiness during the day as he gets too little sleep at night.  He may fall asleep accidentally sitting at the table.    He reports waking up coughing and choking at times which she attributes to difficulty with swallowing since he experienced a stroke.  He is known to snore quite loudly and he and his wife sleep in separate rooms as a consequence.  He reports frequent leg cramps that keep him from sleeping which is been happening up to 4 times a week.  He is known to sleep talk.    He had a June 2020 overnight oximetry which showed that saturations were less than or equal to 88% for 132.4 minutes with a bartolo saturation of 59%.  The oximetric pattern was sawtoothed and episodic consistent with ASHLEY.    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      Patient Active Problem List    Diagnosis Date Noted   • Hydronephrosis 01/20/2020     Priority: Medium   • Paroxysmal atrial " fibrillation (HCC) 05/23/2017     Priority: Medium   • (Formerly [facility]) Hypertension associated with diabetes 03/22/2017     Priority: Medium   • H/O Cerebrovascular accident (CVA) in adulthood 12/30/2016     Priority: Medium   • Coronary artery disease involving native coronary artery 12/30/2016     Priority: Medium   • (HCC) Hyperlipidemia associated with type 2 diabetes mellitus 12/13/2011     Priority: Medium   • GERD with esophagitis 10/19/2018     Priority: Low   • Recurrent UTI 04/26/2018     Priority: Low   • (Formerly [facility]) Left hemiparesis 12/27/2017     Priority: Low   • (Formerly [facility]) Diabetic nephropathy associated with type 2 diabetes mellitus 11/30/2017     Priority: Low   • Psychophysiological insomnia 11/21/2017     Priority: Low   • (Formerly [facility]) Moderate episode of recurrent major depressive disorder 11/07/2017     Priority: Low   • Wheelchair dependent 11/07/2017     Priority: Low   • H/O non-Hodgkin's lymphoma 05/08/2017     Priority: Low   • Type 2 diabetes mellitus, with long-term current use of insulin (Formerly [facility]) 12/30/2016     Priority: Low   • Stage 3 chronic kidney disease (Formerly [facility]) 08/25/2016     Priority: Low   • Idiopathic chronic gout of multiple sites without tophus 01/28/2014     Priority: Low   • ASHLEY (obstructive sleep apnea) 08/27/2020   • Low testosterone in male 07/06/2020   • Nocturnal hypoxemia 07/06/2020   • Chronic fatigue 06/09/2020   • Essential hypertension, benign 05/06/2020   • Polypharmacy 02/04/2020   • Renal cyst 01/29/2020   • Benign prostatic hyperplasia with urinary obstruction 01/29/2020   • Altered mobility due to old stroke 01/22/2020   • Normocytic anemia 01/21/2020   • History of renal calculi 11/19/2019   • Neurogenic bladder 11/19/2019   • PVD (peripheral vascular disease) (Formerly [facility]) 10/08/2019   • Strain of flexor muscle of right hip 05/20/2019   • Muscle strain of right gluteal region 05/20/2019   • Left hand weakness 05/20/2019   • Urinary incontinence 02/19/2019   • (Formerly [facility]) Tibial artery disease 12/27/2018        Past Medical History:   Diagnosis Date   • (Formerly [facility]) Chronic ulcer of right lower extremity with fat layer exposed 12/27/2018   • Acute on chronic renal failure (Formerly [facility]) 1/21/2020   • Acute respiratory failure with hypoxia (Formerly [facility]) 5/20/2017   • CAD (coronary artery disease)     GIOVANNA to RCA in '97, GIOVANNA X2 to LAD and GIOVANNA X2 to OM in '09   • Cancer (Formerly [facility])     2017; chemo lympoma   • Cataract    • Cerebrovascular accident (CVA) (Formerly [facility]) 12/30/2016    Left arm weakness  etiology of stroke not established, lymphoma discovered on MRI evaluation of stroke, L hemiparesis much worse after acute infectious illness in mid 2017, but no specific diagnosed recurrent neurological etiology, all at [facility]   • CKD (chronic kidney disease) stage 3, GFR 30-59 ml/min (Formerly [facility])    • Controlled gout 2014   • Coronary atherosclerosis of native coronary artery     S/P PTCA (percutaneous transluminal coronary angioplasty), RCA, 5/1997, patent on cath 7/10/2009 at the time of interventions on his left anterior descending and circumflex coronary arteries   • Depression    • Diabetes (Formerly [facility])    • Enterococcal septicemia (Formerly [facility]) 8/12/2017   • Roberto hematuria 1/29/2020   • Hypertension    • Hypokalemia 2012    controlled with combination of ACE inhibitor or ARB plus spironolactone   • Hypomagnesemia 08/12/2017    etiology uncertain   • Influenza A 1/26/2020   • Leg edema, right 1/22/2020   • 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 Barry   • Normocytic hypochromic anemia 5/20/2017   • NSTEMI (non-ST elevated myocardial infarction) (Formerly [facility]) 07/18/2017    complicating UTI with sepsis   • Pain    • Polyneuropathy in diabetes(357.2) 9/11/2013   • Renal mass 1/21/2020   • Septic shock (Formerly [facility]) 5/20/2017   • Skin ulcer of calf (Formerly [facility]) 2015    Right, Dr. Terry and wound care   • Stroke (Formerly [facility]) 2016    left sided weakness   • Urinary bladder  disorder    • Urinary incontinence    • Wound of left leg 2012    Requiring surgery and debridment, Dr. Moore        Past Surgical History:   Procedure Laterality Date   • CYSTOSCOPY STENT PLACEMENT Left 2/12/2018    Procedure: CYSTOSCOPY  ;  Surgeon: Rey Barry M.D.;  Location: SURGERY [facility];  Service: Urology   • URETEROSCOPY Left 2/12/2018    Procedure: URETEROSCOPY- FLEXIBLE  ;  Surgeon: Rey Barry M.D.;  Location: SURGERY [facility];  Service: Urology   • LASERTRIPSY Left 2/12/2018    Procedure: LASERTRIPSY - LITHO  ;  Surgeon: Rey Barry M.D.;  Location: SURGERY [facility];  Service: Urology   • WOUND CLOSURE GENERAL  4/3/2012    Performed by GERHARD MOORE at SURGERY [facility] DAY [facility]   • ZZZ CARDIAC CATH  2009    Stents to LAD, Om   • DAGOBERTO BY LAPAROSCOPY  1998   • ANGIOPLASTY  1997    RCA followed by other stents as noted above.    • ZZZ CARDIAC CATH  1997    stent RCA   • CATARACT EXTRACTION WITH IOL      bilateral   • LITHOTRIPSY     • TONSILLECTOMY AND ADENOIDECTOMY         Family History   Problem Relation Age of Onset   • Heart Disease Father         CAD   • Diabetes Father    • Cancer Mother    • Psychiatric Illness Mother         Depression   • Depression Mother    • Kidney stones Brother    • Heart Disease Brother    • Psychiatric Illness Brother         Depression   • Depression Brother    • Suicide Attempts Other    • Psychiatric Illness Other         autism       Social History     Socioeconomic History   • Marital status:      Spouse name: Not on file   • Number of children: Not on file   • Years of education: Not on file   • Highest education level: Not on file   Occupational History   • Not on file   Social Needs   • Financial resource strain: Not on file   • Food insecurity     Worry: Not on file     Inability: Not on file   • Transportation needs     Medical: Not on file     Non-medical: Not on file   Tobacco Use   • Smoking status: Never Smoker    • Smokeless tobacco: Never Used   • Tobacco comment: continued abstinence   Substance and Sexual Activity   • Alcohol use: No   • Drug use: No   • Sexual activity: Not Currently     Partners: Female     Comment: , one daughter, 2 grands   Lifestyle   • Physical activity     Days per week: Not on file     Minutes per session: Not on file   • Stress: Not on file   Relationships   • Social connections     Talks on phone: Not on file     Gets together: Not on file     Attends Anglican service: Not on file     Active member of club or organization: Not on file     Attends meetings of clubs or organizations: Not on file     Relationship status: Not on file   • Intimate partner violence     Fear of current or ex partner: Not on file     Emotionally abused: Not on file     Physically abused: Not on file     Forced sexual activity: Not on file   Other Topics Concern   • Not on file   Social History Narrative    Av is from [place], [place] and raised in [place]. He has been in [place] since 2004. 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  He has been  to Usha since 2003, they met through a mutual friend. He has a daughter (Kalina) and a step son (Jimi).       Current Outpatient Medications   Medication Sig Dispense Refill   • zolpidem (AMBIEN) 5 MG Tab Take 1 Tab by mouth at bedtime as needed for Sleep (1 to 2 po qhs prn insomnia/sleep study. Bring to sleep study.) for up to 2 doses. 2 Tab 0   • allopurinol (ZYLOPRIM) 100 MG Tab TAKE 1 TABLET BY MOUTH EVERY DAY 90 Tab 0   • clopidogrel (PLAVIX) 75 MG Tab TAKE 1 TABLET BY MOUTH EVERY DAY 90 Tab 1   • Insulin Pen Needle 32 G x 4 mm (BD PEN NEEDLE SWATI U/F) USE FOR INSULIN SHOTS FIVE TIMES DAILY 500 Each 3   • fluoxetine (PROZAC)  "40 MG capsule Take 1 Cap by mouth every day. 90 Cap 3   • losartan (COZAAR) 50 MG Tab Take 1 Tab by mouth 2 Times a Day. 180 Tab 3   • TRULICITY 1.5 MG/0.5ML Solution Pen-injector INJECT CONTENTS OF 1 SYRINGE SUBCUTANEOUSLY EVERY 7 DAYS ON TUESDAY 6 mL 1   • metoprolol SR (TOPROL XL) 100 MG TABLET SR 24 HR Take 1 Tab by mouth every day. 90 Tab 3   • amLODIPine (NORVASC) 5 MG Tab Take 1 Tab by mouth every day. 90 Tab 3   • Multiple Vitamin (MULTI VITAMIN MENS PO) Take  by mouth.     • potassium chloride (KLOR-CON) 8 MEQ tablet TAKE 1 TABLET BY MOUTH EVERY DAY 90 Tab 3   • ONE TOUCH ULTRA TEST strip USE TO TEST FIVE TIMES DAILY 600 Strip 1   • aspirin EC (ECOTRIN) 81 MG Tablet Delayed Response Take 81 mg by mouth every day.     • Probiotic Product (PROBIOTIC DAILY PO) Take 1 Cap by mouth 2 Times a Day.     • magnesium oxide (MAG-OX) 400 MG Tab Take 400 mg by mouth every day.     • finasteride (PROSCAR) 5 MG Tab Take 1 Tab by mouth every day. 30 Tab 0   • atorvastatin (LIPITOR) 80 MG tablet Take 80 mg by mouth every evening.     • fenofibrate (TRICOR) 145 MG Tab Take 145 mg by mouth every evening.     • alfuzosin (UROXATRAL) 10 MG SR tablet Take 10 mg by mouth every day.     • methenamine hip (HIPPREX) 1 GM Tab Take 1 g by mouth 2 times a day.     • Insulin Glargine (TOUJEO MAX SOLOSTAR) 300 UNIT/ML Solution Pen-injector Inject 32 Units as instructed every bedtime.     • insulin lispro, Human, (HUMALOG KWIKPEN) 100 UNIT/ML Solution Pen-injector injection Inject 5-9 Units as instructed 3 times a day before meals. Sliding Scale     • acetaminophen (TYLENOL) 500 MG Tab Take 1,000 mg by mouth every 6 hours as needed for Mild Pain or Moderate Pain.     • Cholecalciferol (VITAMIN D) 2000 units Cap Take 4,000 Units by mouth 2 Times a Day.       No current facility-administered medications for this visit.     \"CURRENT RX\"    ALLERGIES: Diphenhydramine hcl, Lorazepam, Ciprofloxacin, and Spironolactone    ROS    Constitutional: " Denies fever, chills, sweats,  weight loss, fatigue.  Eyes: Denies vision loss, pain, drainage, double vision, glasses.  Ears/Nose/Mouth/Throat: Denies earache, difficulty hearing, rhinitis/nasal congestion, injury, recurrent sore throat, persistent hoarseness, decayed teeth/toothaches, ringing or buzzing in the ears.  Cardiovascular: Denies chest pain, tightness, palpitations, swelling in legs/feet, fainting, difficulty breathing when lying down but gets better when sitting up.   Respiratory: Denies shortness of breath, cough, sputum, wheezing, painful breathing, coughing up blood.   Sleep: per HPI  Gastrointestinal: Positive for difficulty swallowing  Genitourinary: Positive for frequent urination  Musculoskeletal: Denies painful joints, sore muscles, back pain.   Integumentary: Denies rashes, lumps, color changes.   Neurological: Positive for weakness    PHYSICAL EXAM  Wheelchair-bound    /70 (BP Location: Right arm, Patient Position: Sitting, BP Cuff Size: Adult)   Pulse 60   Resp 18   Ht 1.829 m (6')   Wt 87.5 kg (193 lb)   SpO2 97%   BMI 26.18 kg/m²   Appearance: Well-nourished, well-developed, no acute distress  Eyes:  PERRLA, EOMI; glasses  ENMT: Mask on  Neck: Supple, trachea midline, no masses  Respiratory effort:  No intercostal retractions or use of accessory muscles  Lung auscultation:  No wheezes rhonchi rubs or rales  Cardiac: No murmurs, rubs, or gallops; regular rhythm, normal rate; no edema  Abdomen:  No tenderness, no organomegaly  Musculoskeletal:  Grossly normal; gait and station normal; digits and nails normal  Skin:  No rashes, petechiae, cyanosis  Neurologic: Left hemiparesis  Psychiatric:  No depression, anxiety, agitation        PROBLEMS:    1. ASHLEY (obstructive sleep apnea)  - Polysomnography, 4 or More; Future  - zolpidem (AMBIEN) 5 MG Tab; Take 1 Tab by mouth at bedtime as needed for Sleep (1 to 2 po qhs prn insomnia/sleep study. Bring to sleep study.) for up to 2 doses.   Dispense: 2 Tab; Refill: 0    2. Nocturnal hypoxemia    3. (HCC) Hyperlipidemia associated with type 2 diabetes mellitus    4. Essential hypertension, benign    5. GERD with esophagitis    6. H/O Cerebrovascular accident (CVA) in adulthood    7. H/O non-Hodgkin's lymphoma    8. Idiopathic chronic gout of multiple sites without tophus    9. Paroxysmal atrial fibrillation (HCC)    10. Stage 3 chronic kidney disease (HCC)    11. Polypharmacy    12. Wheelchair dependent  Answers for HPI/ROS submitted by the patient on 8/25/2020   Year of your last physical exam: 2020  Results of exam: Refer to Dr. Bunch  Occupation : Retired  Height: 6 feet  Current weight: 186 lbs.  6 months ago: 190lbs.  At age 22: 210lbs.  What is the reason for your visit today?: Discuss sleep problems  Name of person referring you to the Sleep Center: Dr. Madison Bunch  Have you ever been hospitalized?: Yes  Reason, year, and hospital in which you were hospitalized:: Numerous times. Latest was 1/2020 at [facility]  Have you ever had problems with anesthesia?: No  Have you experienced post-operative delirium?: No  Any complications with surgery?: No  What year did you receive your last Flu shot?: 2019  What year did you receive you last Pneumonia shot?: 2020  Have you had a TB skin test? If so, please list the year and result:: Don’t recall  Have you had Allergy skin testing? If so, please list the year and result:: No  Please briefly describe your sleep problem and how old you were when it began.: Trouble falling asleep at night and staying asleep. Often awake most of the night. Sleep a lot during the day.  How does this affect your daily life and activities? Please also rate how serious of a problem this is (1 = Not at all, 10 = Very Serious).: Always tired and fatigued. Lack energy. Problem became worse after stroke and cancer and septic shock in 2017.  Have you had any previous evaluations, examinations, or treatment for this  sleep problem or any other problems with your sleep? If so, please describe the evaluation, treatment, and results.: Two months ago wore an oximeter all night wih a  monitor. Showed 9 1/2 hours of data. For two plus hours my oxygen level was below 90. Lowest reading was  59 I believe Dr. Bunch said.  Have you used any medications (prescribed or otherwise) to help your sleep problem? If yes, include name, amount, frequency, and the prescribing physician.: No  If employed, what time do you usually start and end work?: Retired  Do you ever change work shifts? If yes, describe how often (never, infrequently, regularly).: N/A  What time do you usually go to bed and wake up on: Weekdays? Weekends?: No set times  Do you have a regular bed partner?: No  How many minutes does it usually take to fall asleep at night after turning off the lights?: Sometimes immediately, sometimes I’m awake most of the night.  What do you ordinarily do just prior to turning out the lights and attempting to go to sleep (e.g., reading, TV, baths, etc.)?: TV is on always as is a small light.  On average, how many times do you wake up during the night?: Don’t really know  On average, how many times do you wake up to use the bathroom?: I’m incontinent. I wear disposal briefs.  Do you often wake up too early in the morning and are unable to return to sleep?: Usually sleep better in the early morning  On average, how many hours of sleep do you get per night?: Really varies. Maybe 5 or 6 hours.  How do you usually awaken?  Alarm, spontaneously, or other?: Spontaneously or my wife wakes me  Is it difficult for you to awaken and get out of bed after sleeping? (Not at all, Sometimes, Very): No  Do you nap or return to bed after arising?: Frequently  Are you bothered by sleepiness during the day?: Yes  Do you feel that you get too much sleep at night?: No  Do you feel that you get too little sleep at night?: Yes, most of the time  Do you usually feel  "tired during the day? If so, what do you attribute this to?: Yes. Poor sleep at night.  Do you find yourself falling asleep when you don't mean to? : I can fall asleep sitting at the table. Yes.  If yes, how long does your sleep episode last?: Not long. I’ll just go to bed.  Do you feel rested or refreshed after the sleep episode?: No  Have you ever suddenly fallen?: Yes  Have you ever experienced sudden body weakness?: Yes  If yes, were you aware of the things around you?: Yes  Was the weakness brought on by any particular event or feeling? If so, briefly describe.: Knees just gave out. I’ve had a stroke so my left side is weak.  Have you ever experienced weakness or paralysis upon going to sleep?: No  Have you ever experienced weakness or paralysis upon awakening from sleep?: No  Have you ever experienced seeing things or hearing voices/noises: That weren't real? On going to sleep? During the night? On awakening from sleep? During the day?: No  Do you have difficulty breathing at night? If yes, briefly describe.: No, but I wake up coughing and choking at times. I attribute it to difficulty with swallowing since my stroke  How many times per week?: Frequently  How did you become aware of this, at what age did this first occur, and how many years has this occurred?: Was told I do this by my wife. It has become worse in last two years.  Have you been told you snore while asleep? If so, does it disturb a bed partner (or someone in the same room), or someone in the next room?: At times. I sleep in a separate room.  Have you ever experienced doing something without being aware of the action? If yes, please describe.: No  Have you ever experienced upon lying in bed before sleep or on awakening from sleep: Restlessness of legs, \"nervous legs\", \"creeping crawling\" sensation of legs, or twitching of legs?: Frequent leg cramps that can keep me from sleeping.  How many times per week does this occur, and how many minutes does " the sensation last?: Lately this is happening a lot. Maybe four times a week. Sometimes the cramping lasts for hours.  Does anything relieve the sensations (e.g., getting out of bed, medication, massage)?: Yes  At what age did this first occur, and how many years has this occurred?: Age 73 and it’s been a couple years  Have you ever been told that your arms or legs jerk or twitch while you are asleep? If yes, how many times per night does this occur?: Yes, don’t know how often.  At what age did this first occur, and how many years has this occurred?: Don’t know  Does this seem to awaken you from your sleep?: No  Do you know, or have you ever been told that you do any of the following while sleeping: talk, walk, grit teeth, wet the bed, wake up screaming or seemingly afraid, have disturbing dreams, have unusual movements, wake up with headaches, (males) have erections? If yes to any of these, please indicate how many times per week, age started, last occurrence, treatment received.: Talk in sleep, wet bed ( due to incontinence), occasional bad dream. No treatment.  Has anyone in your family been known to have any sleep problems? If yes, please list the type of problem (e.g., trouble getting to sleep, too sleepy, bed wetting, etc.), the relationship of this person to you, and the treatment received.: My mother always had trouble going to sleep.      PLAN:   The patient has signs and symptoms consistent with obstructive sleep apnea hypopnea syndrome. Will schedule to have a nocturnal polysomnogram using zolpidem to assist with sleep onset and maintenance should the need arise. Will return after the results are available to determine further diagnostic needs and/or treatment options.    The risks of untreated sleep apnea were discussed with the patient at length. Patients with ASHLEY are at increased risk of cardiovascular disease including coronary artery disease, systemic arterial hypertension, pulmonary arterial  hypertension, cardiac arrythmias, and stroke. ASHLEY patients have an increased risk of motor vehicle accidents, type 2 diabetes, chronic kidney disease, and non-alcoholic liver disease. The patient was advised to avoid driving a motor vehicle when drowsy.    Positive airway pressure, such as CPAP, is considered first-line and preferred therapy for sleep apnea and may reverse both symptoms and risks.    Have advised the patient to follow up with the appropriate healthcare practitioners for all other medical problems and issues.        Return for after sleep study.

## 2020-08-27 ENCOUNTER — SLEEP CENTER VISIT (OUTPATIENT)
Dept: SLEEP MEDICINE | Facility: MEDICAL CENTER | Age: 75
End: 2020-08-27
Payer: MEDICARE

## 2020-08-27 VITALS
BODY MASS INDEX: 26.14 KG/M2 | HEART RATE: 60 BPM | OXYGEN SATURATION: 97 % | WEIGHT: 193 LBS | DIASTOLIC BLOOD PRESSURE: 70 MMHG | SYSTOLIC BLOOD PRESSURE: 122 MMHG | RESPIRATION RATE: 18 BRPM | HEIGHT: 72 IN

## 2020-08-27 DIAGNOSIS — Z79.899 POLYPHARMACY: ICD-10-CM

## 2020-08-27 DIAGNOSIS — N18.30 STAGE 3 CHRONIC KIDNEY DISEASE: ICD-10-CM

## 2020-08-27 DIAGNOSIS — E11.69 HYPERLIPIDEMIA ASSOCIATED WITH TYPE 2 DIABETES MELLITUS (HCC): ICD-10-CM

## 2020-08-27 DIAGNOSIS — E78.5 HYPERLIPIDEMIA ASSOCIATED WITH TYPE 2 DIABETES MELLITUS (HCC): ICD-10-CM

## 2020-08-27 DIAGNOSIS — Z99.3 WHEELCHAIR DEPENDENCE: ICD-10-CM

## 2020-08-27 DIAGNOSIS — M1A.09X0 IDIOPATHIC CHRONIC GOUT OF MULTIPLE SITES WITHOUT TOPHUS: ICD-10-CM

## 2020-08-27 DIAGNOSIS — G47.33 OSA (OBSTRUCTIVE SLEEP APNEA): ICD-10-CM

## 2020-08-27 DIAGNOSIS — I48.0 PAROXYSMAL ATRIAL FIBRILLATION (HCC): ICD-10-CM

## 2020-08-27 DIAGNOSIS — G47.34 NOCTURNAL HYPOXEMIA: ICD-10-CM

## 2020-08-27 DIAGNOSIS — I10 ESSENTIAL HYPERTENSION, BENIGN: ICD-10-CM

## 2020-08-27 DIAGNOSIS — K21.00 GERD WITH ESOPHAGITIS: ICD-10-CM

## 2020-08-27 DIAGNOSIS — Z86.73 HISTORY OF CEREBROVASCULAR ACCIDENT (CVA) IN ADULTHOOD: ICD-10-CM

## 2020-08-27 DIAGNOSIS — Z85.72 H/O NON-HODGKIN'S LYMPHOMA: ICD-10-CM

## 2020-08-27 PROCEDURE — 99204 OFFICE O/P NEW MOD 45 MIN: CPT | Performed by: INTERNAL MEDICINE

## 2020-08-27 RX ORDER — ZOLPIDEM TARTRATE 5 MG/1
5 TABLET ORAL NIGHTLY PRN
Qty: 2 TAB | Refills: 0 | Status: SHIPPED | OUTPATIENT
Start: 2020-08-27 | End: 2020-09-27

## 2020-08-27 ASSESSMENT — FIBROSIS 4 INDEX: FIB4 SCORE: 2.73

## 2020-09-09 ENCOUNTER — OFFICE VISIT (OUTPATIENT)
Dept: MEDICAL GROUP | Facility: MEDICAL CENTER | Age: 75
End: 2020-09-09
Payer: MEDICARE

## 2020-09-09 VITALS
BODY MASS INDEX: 26.43 KG/M2 | DIASTOLIC BLOOD PRESSURE: 64 MMHG | HEART RATE: 60 BPM | HEIGHT: 72 IN | OXYGEN SATURATION: 100 % | WEIGHT: 195.11 LBS | SYSTOLIC BLOOD PRESSURE: 118 MMHG | TEMPERATURE: 96.9 F

## 2020-09-09 DIAGNOSIS — F51.04 PSYCHOPHYSIOLOGICAL INSOMNIA: ICD-10-CM

## 2020-09-09 DIAGNOSIS — G81.94 LEFT HEMIPARESIS (HCC): ICD-10-CM

## 2020-09-09 DIAGNOSIS — M25.551 RIGHT HIP PAIN: ICD-10-CM

## 2020-09-09 DIAGNOSIS — Z79.4 TYPE 2 DIABETES MELLITUS WITH OTHER SPECIFIED COMPLICATION, WITH LONG-TERM CURRENT USE OF INSULIN (HCC): ICD-10-CM

## 2020-09-09 DIAGNOSIS — E11.69 TYPE 2 DIABETES MELLITUS WITH OTHER SPECIFIED COMPLICATION, WITH LONG-TERM CURRENT USE OF INSULIN (HCC): ICD-10-CM

## 2020-09-09 DIAGNOSIS — N18.30 STAGE 3 CHRONIC KIDNEY DISEASE: ICD-10-CM

## 2020-09-09 DIAGNOSIS — Z86.73 HISTORY OF CEREBROVASCULAR ACCIDENT (CVA) IN ADULTHOOD: ICD-10-CM

## 2020-09-09 PROCEDURE — 99214 OFFICE O/P EST MOD 30 MIN: CPT | Performed by: INTERNAL MEDICINE

## 2020-09-09 RX ORDER — TRIAMCINOLONE ACETONIDE 1 MG/G
CREAM TOPICAL
COMMUNITY
Start: 2020-07-14 | End: 2020-11-17

## 2020-09-09 ASSESSMENT — FIBROSIS 4 INDEX: FIB4 SCORE: 2.73

## 2020-09-09 NOTE — ASSESSMENT & PLAN NOTE
Chronic and ongoing problem. Continue follow up with sleep medicine, sleep study pending. Will see if we can improve his right leg/hip pain to assist with better quality of sleep.

## 2020-09-09 NOTE — ASSESSMENT & PLAN NOTE
Chronic and stable problem.  GFR continues to run in the high 40s.  Avoid nephrotoxic agents as able and stay well-hydrated.  Continue follow-up with nephrology as recommended.

## 2020-09-09 NOTE — ASSESSMENT & PLAN NOTE
Chronic and ongoing problem, agree with reducing Toujeo due to morning hypoglycemia. Hopefully can continue to reduce his diabetic regimen as his A1c's have been in the prediabetic range lately.

## 2020-09-09 NOTE — PROGRESS NOTES
Subjective:   Chief Complaint/History of Present Illness:  Av Bob is a 75 y.o. male established patient who presents today to discuss medical problems as listed below. He is accompanied by his wife, Usha.    Problem   Psychophysiological Insomnia    He notes significant challenges with insomnia currently related to right leg/hip cramps and pain. He is planning to undergo a sleep study due to significantly abnormal overnight pulse oximetry. He has ongoing daytime somnolence which has been present since his stroke/cancer/ICU stay in 2018.     Type 2 Diabetes Mellitus, With Long-Term Current Use of Insulin ([facility])       Ref. Range 2/4/2020 11:45 6/19/2020 08:14   Glycohemoglobin Latest Ref Range: 0.0 - 5.6 % 5.4 5.8 (H)   Estim. Avg Glu Latest Units: mg/dL  120       Longstanding history of type 2 diabetes on insulin.  Is following with endocrinology, Dr. Rasheed.  He does A1c in fall 2019 was 6.2.  Current regimen includes Trulicity 1.5 mg weekly, Toujeo 28 units at bedtime, and Humalog 5 to 9 units 3 times daily before meals (usually do not give this).    Av and Usha noticed that he has had lower fasting blood sugars lately.  He will run anywhere from 60-80 in the morning.  In the evening he will be in the low to mid 100s.  He has had recent challenges with fatigue and daytime somnolence.    On recent follow-up with endocrinology in fall 2020 his Toujeo was decreased to 28 units and he continues with Humalog dose based off preprandial blood sugar     Stage 3 Chronic Kidney Disease (Hcc)       Ref. Range 5/7/2020 12:13 8/17/2020 14:29   Bun Latest Ref Range: 8 - 22 mg/dL 22 31 (H)   Creatinine Latest Ref Range: 0.50 - 1.40 mg/dL 1.45 (H) 1.44 (H)   GFR If Non  Latest Ref Range: >60 mL/min/1.73 m 2 48 (A) 48 (A)       He has a history of chronic kidney disease related to nephrolithiasis, recurrent UTIs, and BPH as well as history of hypertension and diabetes.  He is following with  nephrology, Dr. Jarvis.     Spironolactone was discontinued due to worsening chronic kidney disease.    Other current renally excreted medications include losartan 50 mg daily, potassium chloride 8 mEq daily, and Toujeo/Humalog.    He had recent follow-up with his nephrologist who is very happy with the stability of his renal function.     Right posterior hip pain and right leg cramping    He reports for the last several weeks he has had debilitating cramping pain and aching in the right lower extremity.  It started with cramps in the right calf which have since migrated up to the right posterior and lateral hip area.  He had something like this 1 to 2 years ago that took several months to resolve.  He is working with an exercise therapist twice per week but they have not been able to make much headway.  She gave him resistance bands so he can work on dorsi/plantar flexion to loosen up the muscles in the calf.  Not much has given him relief for the pain in the gluteal/hip region.  Of note, he has history of stroke proximately 3 years ago with resultant hemiplegia and is dependent on a wheelchair for mobilization.  He is able to stand with the assistance of devices but has not been able to consistently use a walker for his gait.  No clear inciting trauma.  He has not gone back to physical therapy yet which she was receiving at [facility] due to challenges with scheduling and COVID-19.  He does have a wheelchair that he should be receiving soon that will be much lighter than his current wheelchair and underwent thorough testing by physical therapy before this was ordered.  He is quite hesitant to use any medicine for the pain as he Rubens has significant polypharmacy related to other comorbidities.  He does have Tylenol available but is only utilizing 502,000 mg once or twice daily.  Not using any muscle relaxers.  He is unable to describe the quality of the pain, he says it just hurts.  This is having a huge impact  on his ability to sleep and remain comfortable at night and thus he is now up several hours at night and quite fatigued during the day.  He would be interested in establishing with physiatry due to both his history of left hemiplegia as well as his current complaints to see if he could be treated with trigger point injections and other non-oral medicine modalities.          Additional History:   Allergies:    Diphenhydramine hcl, Lorazepam, Ciprofloxacin, and Spironolactone     Current Medications:     Current Outpatient Medications   Medication Sig Dispense Refill   • zolpidem (AMBIEN) 5 MG Tab Take 1 Tab by mouth at bedtime as needed for Sleep (1 to 2 po qhs prn insomnia/sleep study. Bring to sleep study.) for up to 2 doses. 2 Tab 0   • allopurinol (ZYLOPRIM) 100 MG Tab TAKE 1 TABLET BY MOUTH EVERY DAY 90 Tab 0   • clopidogrel (PLAVIX) 75 MG Tab TAKE 1 TABLET BY MOUTH EVERY DAY 90 Tab 1   • Insulin Pen Needle 32 G x 4 mm (BD PEN NEEDLE SWATI U/F) USE FOR INSULIN SHOTS FIVE TIMES DAILY 500 Each 3   • fluoxetine (PROZAC) 40 MG capsule Take 1 Cap by mouth every day. 90 Cap 3   • losartan (COZAAR) 50 MG Tab Take 1 Tab by mouth 2 Times a Day. 180 Tab 3   • TRULICITY 1.5 MG/0.5ML Solution Pen-injector INJECT CONTENTS OF 1 SYRINGE SUBCUTANEOUSLY EVERY 7 DAYS ON TUESDAY 6 mL 1   • metoprolol SR (TOPROL XL) 100 MG TABLET SR 24 HR Take 1 Tab by mouth every day. 90 Tab 3   • amLODIPine (NORVASC) 5 MG Tab Take 1 Tab by mouth every day. 90 Tab 3   • Multiple Vitamin (MULTI VITAMIN MENS PO) Take  by mouth.     • potassium chloride (KLOR-CON) 8 MEQ tablet TAKE 1 TABLET BY MOUTH EVERY DAY 90 Tab 3   • ONE TOUCH ULTRA TEST strip USE TO TEST FIVE TIMES DAILY 600 Strip 1   • aspirin EC (ECOTRIN) 81 MG Tablet Delayed Response Take 81 mg by mouth every day.     • Probiotic Product (PROBIOTIC DAILY PO) Take 1 Cap by mouth 2 Times a Day.     • magnesium oxide (MAG-OX) 400 MG Tab Take 400 mg by mouth every day.     • finasteride (PROSCAR)  5 MG Tab Take 1 Tab by mouth every day. 30 Tab 0   • atorvastatin (LIPITOR) 80 MG tablet Take 80 mg by mouth every evening.     • fenofibrate (TRICOR) 145 MG Tab Take 145 mg by mouth every evening.     • alfuzosin (UROXATRAL) 10 MG SR tablet Take 10 mg by mouth every day.     • methenamine hip (HIPPREX) 1 GM Tab Take 1 g by mouth 2 times a day.     • Insulin Glargine (TOUJEO MAX SOLOSTAR) 300 UNIT/ML Solution Pen-injector Inject 28 Units as instructed every bedtime.     • insulin lispro, Human, (HUMALOG KWIKPEN) 100 UNIT/ML Solution Pen-injector injection Inject 5-9 Units as instructed 3 times a day before meals. Sliding Scale     • acetaminophen (TYLENOL) 500 MG Tab Take 1,000 mg by mouth every 6 hours as needed for Mild Pain or Moderate Pain.     • Cholecalciferol (VITAMIN D) 2000 units Cap Take 4,000 Units by mouth 2 Times a Day.     • triamcinolone acetonide (KENALOG) 0.1 % Cream        No current facility-administered medications for this visit.         Social History:     Social History     Tobacco Use   • Smoking status: Never Smoker   • Smokeless tobacco: Never Used   • Tobacco comment: continued abstinence   Substance Use Topics   • Alcohol use: Never   • Drug use: Never       ROS: As above in the HPI      Objective:   Physical Exam:    Vitals: /64   Pulse 60   Temp 36.1 °C (96.9 °F) (Temporal)   Ht 1.829 m (6')   Wt 88.5 kg (195 lb 1.7 oz)   SpO2 100%    BMI: Body mass index is 26.46 kg/m².   General/Constitutional: Vitals as above, Well nourished, well developed male in no acute distress   Head/Eyes: Head is grossly normal & atraumatic, bilateral conjunctivae clear and not injected, bilateral EOMI, glasses in place   ENT: Bilateral external ears grossly normal in appearance, Hearing grossly intact, External nares normal in appearance and without discharge/bleeding   Respiratory: No respiratory distress, bilateral lungs are clear to ausculation in all lung fields, no  wheezing/rhonchi/rales   Cardiovascular: Regular rate and rhythm without murmur/rubs, no bilateral lower extremity edema   MSK: Left hemiplegia, ambulates with a wheelchair, pain in right gluteal region   Integumentary: No apparent rashes on skin exposed areas   Psych: Judgment grossly appropriate, no apparent depression/anxiety    Health Maintenance:     - Completed      Assessment and Plan:     Problem List Items Addressed This Visit     Right posterior hip pain and right leg cramping     New problem by my evaluation, patient admits he had this last year for several months as well.  Unclear etiology.  Recommend he schedule his Tylenol 1000 mg 3 times daily.  Minimize anti-inflammatories due to stage III chronic kidney disease.  Will reach out to Dr. Huff at physiatry to see if she can fit him in in the near future to evaluate for trigger point injections and see what additional recommendations she has for his work up.  He and his wife are agreeable with this plan.  They would like to hold off on any prescription medicines such as muscle relaxants at this time. Will hold lipitor to see if that is contributing and will plan to restart at 1/2 dose (40 mg) and add coenzyme Q10.         Stage 3 chronic kidney disease (HCC)     Chronic and stable problem.  GFR continues to run in the high 40s.  Avoid nephrotoxic agents as able and stay well-hydrated.  Continue follow-up with nephrology as recommended.         Type 2 diabetes mellitus, with long-term current use of insulin (HCC)     Chronic and ongoing problem, agree with reducing Toujeo due to morning hypoglycemia. Hopefully can continue to reduce his diabetic regimen as his A1c's have been in the prediabetic range lately.          Psychophysiological insomnia     Chronic and ongoing problem. Continue follow up with sleep medicine, sleep study pending. Will see if we can improve his right leg/hip pain to assist with better quality of sleep.                  RTC: 3  months.    PLEASE NOTE: This dictation was created using voice recognition software. I have made every reasonable attempt to correct obvious errors, but I expect that there are errors of grammar and possibly content that I did not discover before finalizing the note.

## 2020-09-09 NOTE — PATIENT INSTRUCTIONS
- Hold statin.  - I will contact Lorie Huff with physiatry to see when she can get you in for an evaluation.  - Increase Tylenol to 1000 mg three times daily scheduled.

## 2020-09-09 NOTE — ASSESSMENT & PLAN NOTE
New problem by my evaluation, patient admits he had this last year for several months as well.  Unclear etiology.  Recommend he schedule his Tylenol 1000 mg 3 times daily.  Minimize anti-inflammatories due to stage III chronic kidney disease.  Will reach out to Dr. Huff at physiatry to see if she can fit him in in the near future to evaluate for trigger point injections and see what additional recommendations she has for his work up.  He and his wife are agreeable with this plan.  They would like to hold off on any prescription medicines such as muscle relaxants at this time. Will hold lipitor to see if that is contributing and will plan to restart at 1/2 dose (40 mg) and add coenzyme Q10.

## 2020-09-14 ENCOUNTER — OFFICE VISIT (OUTPATIENT)
Dept: PHYSICAL MEDICINE AND REHAB | Facility: REHABILITATION | Age: 75
End: 2020-09-14
Payer: MEDICARE

## 2020-09-14 VITALS
TEMPERATURE: 98.3 F | HEIGHT: 72 IN | WEIGHT: 195 LBS | DIASTOLIC BLOOD PRESSURE: 68 MMHG | HEART RATE: 66 BPM | BODY MASS INDEX: 26.41 KG/M2 | OXYGEN SATURATION: 98 % | SYSTOLIC BLOOD PRESSURE: 108 MMHG

## 2020-09-14 DIAGNOSIS — R32 URINARY INCONTINENCE, UNSPECIFIED TYPE: ICD-10-CM

## 2020-09-14 DIAGNOSIS — Z86.73 HISTORY OF STROKE: Primary | ICD-10-CM

## 2020-09-14 DIAGNOSIS — M54.41 ACUTE RIGHT-SIDED LOW BACK PAIN WITH RIGHT-SIDED SCIATICA: ICD-10-CM

## 2020-09-14 DIAGNOSIS — M79.2 NEUROPATHIC PAIN: ICD-10-CM

## 2020-09-14 DIAGNOSIS — M54.17 RIGHT LUMBOSACRAL RADICULOPATHY: ICD-10-CM

## 2020-09-14 DIAGNOSIS — R53.1 GENERALIZED WEAKNESS: ICD-10-CM

## 2020-09-14 DIAGNOSIS — G81.94 LEFT HEMIPARESIS (HCC): ICD-10-CM

## 2020-09-14 DIAGNOSIS — R25.2 SPASTICITY: ICD-10-CM

## 2020-09-14 PROCEDURE — 99204 OFFICE O/P NEW MOD 45 MIN: CPT | Performed by: PHYSICAL MEDICINE & REHABILITATION

## 2020-09-14 ASSESSMENT — ENCOUNTER SYMPTOMS
TINGLING: 1
FEVER: 0
SENSORY CHANGE: 1
BRUISES/BLEEDS EASILY: 0
FOCAL WEAKNESS: 1
SORE THROAT: 0
CONSTIPATION: 0
BACK PAIN: 1
PALPITATIONS: 0
COUGH: 0
CHILLS: 0
WEAKNESS: 1
SHORTNESS OF BREATH: 0
DIARRHEA: 0
MEMORY LOSS: 0
FALLS: 0

## 2020-09-14 ASSESSMENT — FIBROSIS 4 INDEX: FIB4 SCORE: 2.73

## 2020-09-14 ASSESSMENT — PATIENT HEALTH QUESTIONNAIRE - PHQ9: CLINICAL INTERPRETATION OF PHQ2 SCORE: 0

## 2020-09-14 NOTE — PROGRESS NOTES
[facility]  PM&R [facility]  [facility] [place] [address]  Ph: [phone]    NEW PATIENT EVALUATION    Patient Name: Av Bob   Patient : 1945  Patient Age: 75 y.o.     Examining Physician: Dr. Lorie Huff, DO    PM&R History to Date - Background Information:  Dates of Admission/Discharge to ARU: [facility] - briefly 2017    SUBJECTIVE:   Patient Identification: Av Bob is a 75 y.o. 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   - Recurrence of a problem he had 18 months ago. Worked with  and therapy and slowly improved but it took months.   - Injection history: No injection history.   - Not ever really sure what the cause was.   - First episode: Driving back from the [place] area. Got out of car in [place] and when got out to stretch legs that when it started hurting.   - Has been pain free for about a year until 3 weeks ago when flared again.   - Starts in low back on right, down into hip. Also states has lower leg cramping and foot cramping.   - Percentage scale: 50% in back and 50% in leg. Mostly leg bothers him at night.   - Had stroke RUTHANN2016 and had R brainstem stroke with L sided weakness. Occurred in [place].   - After stroke never used cane, walker, wheelchair.   - Imaging done by a neurologist in 2017 revealed non-Hodgkins lymphoma. Stroke treatment took a back burner to cancer treatment.   - During chemo got septic from Norovirus - hospitalized in bed 5 weeks 2017. From that  point it is when he was so debilitated that he required wheelchair. No radiation.   - Even before virus, while in chemo was able to go to [place], no assistive device.   - Can use walker a little bit, but fatigues. Also can walk with cane with exercise  or therapy. Last PT estefani walked 6 min with cane. Recommend SBA.   - Biggest complaint also is fatigue. Diagnosed by Pulm with ASHLEY. Needs sleep study to get CPAP. Also has low T - being worked up by PCP.   - On Vit D and was 78 in May.   - Sometimes the foot feels like it is falling asleep.   - Last L MRI was in 2005. Has had injections prior. Dont remember the provider. This episode associated with sitting in bleachers during a graduation.   - Has PVD in the right, declined angioplasty.   - Went to rehab in [facility]. Then went to [facility].  - Pain travels from R low back laterally into hip, posteriorly down proximal leg, posteriorly with cramping in lower leg, and right foot falls asleep. Cannot target specifically where numbness is.  States that numbness in right lower extremity only worsened with the acute onset of his back pain 3 weeks ago.  Might have intermittently had numbness in the past but significantly increased but this acute onset of low back pain 3 weeks ago.  -Patient states that he has no saddle anesthesia.  He states that his bowel movements are regular.  He states that he does have difficulty with urination in the sense that he wears adult briefs and sometimes urinates and has no idea he is doing so.  He does have history of BPH and is on bladder medications.     Review of Systems:  Review of Systems   Constitutional: Positive for malaise/fatigue. Negative for chills and fever.   HENT: Negative for congestion and sore throat.    Respiratory: Negative for cough and shortness of breath.    Cardiovascular: Negative for palpitations and leg swelling.   Gastrointestinal: Negative for constipation and diarrhea.    Genitourinary:        Urinary incontinence.   Musculoskeletal: Positive for back pain. Negative for falls and joint pain.   Neurological: Positive for tingling, sensory change, focal weakness and weakness.   Endo/Heme/Allergies: Does not bruise/bleed easily.   Psychiatric/Behavioral: Negative for memory loss.      All other pertinent positive review of systems are noted above in HPI.   All other systems reviewed and are negative.    Past Medical History:  Past Medical History:   Diagnosis Date   • Roberto hematuria 1/29/2020   • Influenza A 1/26/2020   • Leg edema, right 1/22/2020   • Acute on chronic renal failure (HCC) 1/21/2020   • Renal mass 1/21/2020   • ([facility]) Chronic ulcer of right lower extremity with fat layer exposed 12/27/2018   • Enterococcal septicemia ([facility]) 8/12/2017   • Hypomagnesemia 08/12/2017    etiology uncertain   • NSTEMI (non-ST elevated myocardial infarction) ([facility]) 07/18/2017    complicating UTI with sepsis   • Acute respiratory failure with hypoxia ([facility]) 5/20/2017   • Septic shock ([facility]) 5/20/2017   • Normocytic hypochromic anemia 5/20/2017   • Lymphoma ([facility]) 2/19/2017    Large cell   • Cerebrovascular accident (CVA) ([facility]) 12/30/2016    Left arm weakness  etiology of stroke not established, lymphoma discovered on MRI evaluation of stroke, L hemiparesis much worse after acute infectious illness in mid 2017, but no specific diagnosed recurrent neurological etiology, all at [facility]   • Stroke ([facility]) 2016    left sided weakness   • Skin ulcer of calf ([facility]) 2015    Right, Dr. Terry and wound care   • Controlled gout 2014   • Polyneuropathy in diabetes(357.2) 9/11/2013   • Hypokalemia 2012    controlled with combination of ACE inhibitor or ARB plus spironolactone   • Wound of left leg 2012    Requiring surgery and debridment, Dr. Moore   • Nephrolithiasis 2006    right kidney subsequent lithotripsy by Dr. Baryr   • CAD (coronary artery disease)     GIOVANNA to RCA in  '97, GIOVANNA X2 to LAD and GIOVANNA X2 to OM in '09   • Cancer ([facility])     2017; chemo lympoma   • Cataract    • Chickenpox    • CKD (chronic kidney disease) stage 3, GFR 30-59 ml/min ([facility])    • Coronary atherosclerosis of native coronary artery     S/P PTCA (percutaneous transluminal coronary angioplasty), RCA, 5/1997, patent on cath 7/10/2009 at the time of interventions on his left anterior descending and circumflex coronary arteries   • Daytime sleepiness    • Depression    • Diabetes ([facility])    • Difficulty swallowing    • Frequent urination    • GERD (gastroesophageal reflux disease)    • Greenlandic measles    • Hypertension    • Insomnia    • Kidney stone    • Mixed hyperlipidemia    • Pain    • Peripheral vascular disease ([facility])    • Urinary bladder disorder    • Urinary incontinence    • Weakness       Past Surgical History:   Procedure Laterality Date   • CYSTOSCOPY STENT PLACEMENT Left 2/12/2018    Procedure: CYSTOSCOPY  ;  Surgeon: Rey Barry M.D.;  Location: SURGERY [facility];  Service: Urology   • URETEROSCOPY Left 2/12/2018    Procedure: URETEROSCOPY- FLEXIBLE  ;  Surgeon: Rey Barry M.D.;  Location: SURGERY [facility];  Service: Urology   • LASERTRIPSY Left 2/12/2018    Procedure: LASERTRIPSY - LITHO  ;  Surgeon: Rey Barry M.D.;  Location: SURGERY [facility];  Service: Urology   • WOUND CLOSURE GENERAL  4/3/2012    Performed by GERHARD GILBERT at SURGERY SAME DAY [facility]S   • [facility] CARDIAC CATH  2009    Stents to LAD, Om   • DAGOBERTO BY LAPAROSCOPY  1998   • ANGIOPLASTY  1997    RCA followed by other stents as noted above.    • [facility] CARDIAC CATH  1997    stent RCA   • CATARACT EXTRACTION WITH IOL      bilateral   • LITHOTRIPSY     • TONSILLECTOMY     • TONSILLECTOMY AND ADENOIDECTOMY          Current Outpatient Medications:   •  allopurinol (ZYLOPRIM) 100 MG Tab, TAKE 1 TABLET BY MOUTH EVERY DAY, Disp: 90 Tab, Rfl: 0  •  clopidogrel (PLAVIX) 75 MG Tab, TAKE 1 TABLET BY MOUTH EVERY DAY, Disp:  90 Tab, Rfl: 1  •  Insulin Pen Needle 32 G x 4 mm (BD PEN NEEDLE SWATI U/F), USE FOR INSULIN SHOTS FIVE TIMES DAILY, Disp: 500 Each, Rfl: 3  •  fluoxetine (PROZAC) 40 MG capsule, Take 1 Cap by mouth every day., Disp: 90 Cap, Rfl: 3  •  losartan (COZAAR) 50 MG Tab, Take 1 Tab by mouth 2 Times a Day., Disp: 180 Tab, Rfl: 3  •  TRULICITY 1.5 MG/0.5ML Solution Pen-injector, INJECT CONTENTS OF 1 SYRINGE SUBCUTANEOUSLY EVERY 7 DAYS ON TUESDAY, Disp: 6 mL, Rfl: 1  •  metoprolol SR (TOPROL XL) 100 MG TABLET SR 24 HR, Take 1 Tab by mouth every day., Disp: 90 Tab, Rfl: 3  •  amLODIPine (NORVASC) 5 MG Tab, Take 1 Tab by mouth every day., Disp: 90 Tab, Rfl: 3  •  Multiple Vitamin (MULTI VITAMIN MENS PO), Take  by mouth., Disp: , Rfl:   •  potassium chloride (KLOR-CON) 8 MEQ tablet, TAKE 1 TABLET BY MOUTH EVERY DAY, Disp: 90 Tab, Rfl: 3  •  aspirin EC (ECOTRIN) 81 MG Tablet Delayed Response, Take 81 mg by mouth every day., Disp: , Rfl:   •  Probiotic Product (PROBIOTIC DAILY PO), Take 1 Cap by mouth 2 Times a Day., Disp: , Rfl:   •  magnesium oxide (MAG-OX) 400 MG Tab, Take 400 mg by mouth every day., Disp: , Rfl:   •  finasteride (PROSCAR) 5 MG Tab, Take 1 Tab by mouth every day., Disp: 30 Tab, Rfl: 0  •  fenofibrate (TRICOR) 145 MG Tab, Take 145 mg by mouth every evening., Disp: , Rfl:   •  methenamine hip (HIPPREX) 1 GM Tab, Take 1 g by mouth 2 times a day., Disp: , Rfl:   •  Insulin Glargine (TOUJEO MAX SOLOSTAR) 300 UNIT/ML Solution Pen-injector, Inject 28 Units as instructed every bedtime., Disp: , Rfl:   •  insulin lispro, Human, (HUMALOG KWIKPEN) 100 UNIT/ML Solution Pen-injector injection, Inject 5-9 Units as instructed 3 times a day before meals. Sliding Scale, Disp: , Rfl:   •  acetaminophen (TYLENOL) 500 MG Tab, Take 1,000 mg by mouth every 6 hours as needed for Mild Pain or Moderate Pain., Disp: , Rfl:   •  Cholecalciferol (VITAMIN D) 2000 units Cap, Take 4,000 Units by mouth 2 Times a Day., Disp: , Rfl:   •   triamcinolone acetonide (KENALOG) 0.1 % Cream, , Disp: , Rfl:   •  zolpidem (AMBIEN) 5 MG Tab, Take 1 Tab by mouth at bedtime as needed for Sleep (1 to 2 po qhs prn insomnia/sleep study. Bring to sleep study.) for up to 2 doses. (Patient not taking: Reported on 9/14/2020), Disp: 2 Tab, Rfl: 0  •  ONE TOUCH ULTRA TEST strip, USE TO TEST FIVE TIMES DAILY, Disp: 600 Strip, Rfl: 1  •  atorvastatin (LIPITOR) 80 MG tablet, Take 80 mg by mouth every evening., Disp: , Rfl:   •  alfuzosin (UROXATRAL) 10 MG SR tablet, Take 10 mg by mouth every day., Disp: , Rfl:   Allergies   Allergen Reactions   • Diphenhydramine Hcl Anxiety     Pt is able to tolerate  Mg benadryl with less anxiety   • Lorazepam Unspecified     Disorientation   • Ciprofloxacin      Rash,stomach ache   • Spironolactone      Acute kidney injury        Past Social History:  Social History     Socioeconomic History   • Marital status:      Spouse name: Not on file   • Number of children: Not on file   • Years of education: Not on file   • Highest education level: Not on file   Occupational History   • Not on file   Social Needs   • Financial resource strain: Not on file   • Food insecurity     Worry: Not on file     Inability: Not on file   • Transportation needs     Medical: Not on file     Non-medical: Not on file   Tobacco Use   • Smoking status: Never Smoker   • Smokeless tobacco: Never Used   • Tobacco comment: continued abstinence   Substance and Sexual Activity   • Alcohol use: Never   • Drug use: Never   • Sexual activity: Not Currently     Partners: Female     Comment: , one daughter, 2 grands   Lifestyle   • Physical activity     Days per week: Not on file     Minutes per session: Not on file   • Stress: Not on file   Relationships   • Social connections     Talks on phone: Not on file     Gets together: Not on file     Attends Anabaptist service: Not on file     Active member of club or organization: Not on file     Attends meetings of  clubs or organizations: Not on file     Relationship status: Not on file   • Intimate partner violence     Fear of current or ex partner: Not on file     Emotionally abused: Not on file     Physically abused: Not on file     Forced sexual activity: Not on file   Other Topics Concern   • Not on file   Social History Narrative    Av is from [place], [place] and raised in [place]. He has been in [place] since 2004. 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  He has been  to Usha since 2003, they met through a mutual friend. He has a daughter (Kalina) and a step son (Jimi).        Family History:  Family History   Problem Relation Age of Onset   • Heart Disease Father         CAD   • Diabetes Father    • Cancer Mother    • Psychiatric Illness Mother         Depression   • Depression Mother    • Kidney stones Brother    • Heart Disease Brother    • Psychiatric Illness Brother         Depression   • Depression Brother    • Suicide Attempts Other    • Psychiatric Illness Other         autism       Depression and Opioid Screening  PHQ-9:  Depression Screen (PHQ-2/PHQ-9) 6/30/2019 1/22/2020 9/14/2020   PHQ-2 Total Score 0 0 -   PHQ-2 Total Score - - -   PHQ-2 Total Score - - 0   PHQ-9 Total Score - - -     Interpretation of PHQ-9 Total Score   Score Severity   1-4 No Depression   5-9 Mild Depression   10-14 Moderate Depression   15-19 Moderately Severe Depression   20-27 Severe Depression     OBJECTIVE:   Vital Signs:  Vitals:    09/14/20 1403   BP: 108/68   Pulse: 66   Temp: 36.8 °C (98.3 °F)   SpO2: 98%        Pertinent Labs:  Lab Results   Component Value Date/Time    SODIUM 138 08/17/2020 02:29 PM    POTASSIUM 3.7 08/17/2020 02:29 PM    CHLORIDE 103 08/17/2020 02:29 PM    CO2 22  08/17/2020 02:29 PM    GLUCOSE 91 08/17/2020 02:29 PM    BUN 31 (H) 08/17/2020 02:29 PM    CREATININE 1.44 (H) 08/17/2020 02:29 PM    CREATININE 1.4 05/28/2008 05:42 PM    BUNCREATRAT 10 03/27/2017 02:11 PM       Lab Results   Component Value Date/Time    HBA1C 5.8 (H) 06/19/2020 08:14 AM       Lab Results   Component Value Date/Time    WBC 9.5 08/17/2020 02:29 PM    RBC 4.19 (L) 08/17/2020 02:29 PM    HEMOGLOBIN 12.0 (L) 08/17/2020 02:29 PM    HEMATOCRIT 37.1 (L) 08/17/2020 02:29 PM    MCV 88.5 08/17/2020 02:29 PM    MCH 28.6 08/17/2020 02:29 PM    MCHC 32.3 (L) 08/17/2020 02:29 PM    MPV 10.2 08/17/2020 02:29 PM    NEUTSPOLYS 91.70 (H) 01/26/2020 04:39 PM    LYMPHOCYTES 1.60 (L) 01/26/2020 04:39 PM    MONOCYTES 4.30 01/26/2020 04:39 PM    EOSINOPHILS 1.10 01/26/2020 04:39 PM    BASOPHILS 0.20 01/26/2020 04:39 PM    HYPOCHROMIA 1+ 03/21/2012 01:08 PM       Lab Results   Component Value Date/Time    ASTSGOT 59 (H) 01/26/2020 04:39 PM    ALTSGPT 33 01/26/2020 04:39 PM        Physical Exam:   GEN: No apparent distress  HEENT: Head normocephalic, atraumatic.  Sclera nonicteric bilaterally, no ocular discharge appreciated bilaterally.  Mask donned.  CV: Extremities warm and well-perfused, no peripheral edema appreciated bilaterally.  PULMONARY: Breathing nonlabored on room air, no respiratory accessory muscle use.  Not requiring supplemental oxygen.  ABD: Soft, nontender.  SKIN: No appreciable skin breakdown on exposed areas of skin.  Superficial ecchymosis appreciable.  PSYCH: Mood and affect within normal limits.  MSK: Patient does have limitation of full left knee extension due lacking approximately 5 to 10 degrees.  Left hip remains externally rotated at baseline.  No tenderness to palpation along the right low back, hip in all areas (bursa, gluteus medius insertion, etc.).  Straight leg raise negative on the right.  Unable to fully assess on the left due to limitation in ability to fully extend left knee.  Slump  test negative.  Negative facet loading.  NEURO: Awake alert.  Conversational.  Logical thought content.  Answers questions appropriately, does look to wife for several answers.    Motor Exam Upper Extremities   ? Myotome R L   Shoulder Abduction C5 5 2   Elbow flexion C5 5 4   Wrist extension C6 5 2**   Elbow extension C7 5 4   Finger flexion C8 5 4-   Finger abduction T1 5 4-   **Limited by wrist flexor tone    Motor Exam Lower Extremities  ? Myotome R L   Hip flexion L2 5 3+   Knee extension L3 5 3-   Ankle dorsiflexion L4 5 4-   Toe extension L5 5 NT (shoe donned)   Ankle plantarflexion S1 5 4-       Tone on Modified Deuce Scale    R L  R L   Elbow extension (testing tone of elbow flexors)  1+ Hip extension (testing hip flexors)  NT   Elbow flexion (testing tone of elbow extensors)  0 Hip abduction (testing adductors)  1   Wrist extension (testing tone of wrist flexors)   2 Knee extension (testing knee flexors)  1*   Finger extension (testing tone of finger flexors)  2 Knee flexion (testing knee extensors)  0   Supination (testing forearm pronators)  NT Dorsiflexion (testing plantarflexors)  0      Plantarflexion (testing dorsiflexors)  0   *Exam limited by tight hamstrings limiting full extension  Luo's negative bilaterally.  No clonus appreciated at BL ankles.     Imaging:   MRI L Spine 2005  Alignment shows mild degenerative retrolisthesis of L1 on   L2 and L2 on L3.   There is variable anterior marginal endplate spurring   throughout the lumbar spine and lower thoracic spine in the field of   view.  Marrow signal in the vertebral bodies shows no particular   abnormality.   The prevertebral and paraspinous soft tissues show an 8 mm   rounded focus of bright T2 signal in the upper pole of the right kidney   probably representing a cortical cyst.   The conus is normal in position   and signal.   At T10-11 and T11-12, there is moderate degenerative disc   space narrowing.   At T11-12, there is left sided  facet hypertrophy which   effaces left sided posterior  lateral subarachnoid space (T2 axial image   46/54).   At T12-L1, the disc height and signal is within normal limits.      At L1-2 and L2-3  there is degenerative loss of disc signal with mild   loss of disc height.   At L3-4 through L5-S1, there is degenerative loss   of disc signal with near normal disc height.       At T12-L1, there is no significant disc bulge or protrusion.   The neural   foramina are widely patent.  There is no congenital or acquired central   spinal stenosis.     At L1-2, there is mild posterior marginal endplate spurring.   There is   no central spinal stenosis.  The neural foramina remain within normal   limits.     At L2-3, there is mild annular disc bulging.  There is a central disc   protrusion with caudad extrusion which partially effaces the ventral   subarachnoid space.   There is a mild central spinal stenosis (T2 axial   image 25/54 and image 24/54).  There is minimal thinning of inferior   foraminal fat on the left.     At L3-4, there is no significant disc bulge or protrusion.  There is some   minimal degenerative facet hypertrophy.    There is mild inferior   foraminal narrowing on the right.     At L4-5, there is mild annular disc bulging.  There is moderate   degenerative facet arthropathy.   These changes render the thecal sac to   a triangular configuration without rosey central spinal stenosis.  There   is mild right sided foraminal stenosis.  The left neural foramen remains   within normal limits.     At L5-S1, there is a tiny central disc bulge or protrusion.  This   partially effaces the ventral epidural fat and abuts the ventral thecal   sac.  The thecal sac is generous.  The thecal sac does have a somewhat   vacant appearance at the L5-S1 level and the possibility of peripherally   adherent nerve roots associated with arachnoiditis would not be excluded   (T2 axial images 7/54 through 5/54).   The neural foramina  are widely   patent.     ASSESSMENT/PLAN: Av Bob  is a 75 y.o. male with rehabilitation history significant for R CVA 12/31/16 with residual left hemiparesis, general debility, here for evaluation. The following plan was discussed with the patient who is in agreement.     Visit Diagnoses     ICD-10-CM   1. History of stroke  Z86.73   2. Right lumbosacral radiculopathy  M54.17   3. Urinary incontinence, unspecified type  R32   4. Generalized weakness  R53.1   5. Left hemiparesis (HCC)  G81.94   6. Acute right-sided low back pain with right-sided sciatica  M54.41   7. Spasticity  R25.2   8. Neuropathic pain  M79.2        Rehab/Neuro:   1. 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  States level of debility is out of proportion for how well he recovered after stroke.  2. General debility: ?  CIP/CIM when acutely hospitalized several years ago with norovirus  3. Fatigue: Secondary to debility vs post stroke fatigue vs sleep apnea versus combination of all of the aforementioned.  Will be getting sleep study and CPAP.  Also has low T, followed by endocrinology.  -REQUEST records from [facility] during hospitalizations  - On Plavix/ASA  - Primarily uses non-custom wheelchair, occasionally ambulates with walker or cane.  Limited by fatigue.    Assessment of Current Functional Status: Patient had made a remarkable recovery after his initial stroke in December 2016.  Wife reports that he was ambulatory without assistive device and largely had no deficits.  He even progressed to the point where he was able to perform complex balance tasks with therapy.  However, patient did suffer several medical setbacks, one including prolonged hospitalization after developing norovirus and it was at this point that he became severely debilitated requiring assistive device use as well as  wheelchair.  Patient's course also complicated by diagnosis of Hodgkin's lymphoma for which he received chemotherapy shortly after his stroke.  However, wife reports that he still was able to remain independent and even travel to [place] prior to his hospitalization for norovirus.    Spasticity: Significant in the right upper extremity, more minimal in the left lower extremity.  Affects wrist flexors primarily.  Concern for hamstring spasticity limiting full range of motion of the left knee extension.  -Counseled on the importance of continuing stretching and using splint; wife reports patient nonadherent to self stretching program.  -Counseled on potential Botox injections should spasticity worsen, but recommend aggressive stretching on his part right now  -Counseled would not start pharmacologic agent at this time given potential side effects, fall risk etc. of these medications.    Neuropathic Pain/Nociceptive Pain: Right back/hip/leg pain with cramping/numbness - suspect potential lumbo-sacral radiculitis.  No significant motor deficit on the right.  Last lumbar MRI 2005.  Denies saddle anesthesia but does state have urinary incontinence which he cannot sense, but mostly has sensation of full bladder and bowel.  Complicated by PVD, patient declined angioplasty.  -Ordered MRI of the lumbar sacral spine.  -Counseled if develops acute onset motor weakness or bowel bladder issues to go to the emergency department.  -Counseled do not recommend potential neuropathic pain agents at this time given potential negative side effects for patient.    Neurogenic Bladder: Chronic urinary incontinence in setting of BPH.  Followed by urology.  -MRI of LS spine to ensure no neurologic component to urinary incontinence  - On Proscar  - Followed by [facility] [place] (Dr. Barry)    Neurogenic Bowel: Stable.  No issues.    Sleep: Impaired secondary to right lower extremity and back symptoms.   -Recommend lidocaine patch over-the-counter to  attempt to help with sleep at this time.  -Okay for Voltaren gel which patient already has, though counseled there is some minimal systemic resorption  -Counseled would like to avoid medicating for sleep until we figure out etiology of pain.    Mood: On Prozac 40mg daily.     Follow up: 1 month or sooner to go over imaging    Total time spent face to face with patient was 47 minutes. Greater then 50% of my visit was spent on counseling and coordination of care regarding the primary medical diagnosis, secondary medical complications, patient on their condition, best management practices, and risks and benefits of treatment as aforementioned in the assessment and plan. Extensive discussion involved the patient and wife.    Please note that this dictation was created using voice recognition software. I have made every reasonable attempt to correct obvious errors but there may be errors of grammar and content that I may have overlooked prior to finalization of this note.    Dr. Lorie Huff DO, MS  Department of Physical Medicine & Rehabilitation  [facility]  [facility]

## 2020-09-15 NOTE — PROGRESS NOTES
"Thanks for seeing him! I have received paperwork to sign for his wheelchair from [facility]. Do you want to see the manual wheelchair order/recommendations from his PT to make sure they're appropriate or should I go ahead and sign? I can fax them to you to review or scan them into his media. I wasn't sure how hard it would be to \"un-do\" so wanted to offer before I signed and sent it in.    Thanks,  Marylou  "

## 2020-09-16 RX ORDER — ATORVASTATIN CALCIUM 80 MG/1
40 TABLET, FILM COATED ORAL
Qty: 30 TAB | COMMUNITY
Start: 2020-09-16 | End: 2020-10-05 | Stop reason: SDUPTHER

## 2020-09-24 ENCOUNTER — HOSPITAL ENCOUNTER (OUTPATIENT)
Dept: RADIOLOGY | Facility: MEDICAL CENTER | Age: 75
End: 2020-09-24
Attending: PHYSICAL MEDICINE & REHABILITATION
Payer: MEDICARE

## 2020-09-24 DIAGNOSIS — M54.17 RIGHT LUMBOSACRAL RADICULOPATHY: ICD-10-CM

## 2020-09-24 PROCEDURE — 72148 MRI LUMBAR SPINE W/O DYE: CPT

## 2020-09-28 ENCOUNTER — OFFICE VISIT (OUTPATIENT)
Dept: PHYSICAL MEDICINE AND REHAB | Facility: REHABILITATION | Age: 75
End: 2020-09-28
Payer: MEDICARE

## 2020-09-28 VITALS
HEART RATE: 65 BPM | OXYGEN SATURATION: 99 % | SYSTOLIC BLOOD PRESSURE: 108 MMHG | BODY MASS INDEX: 26.41 KG/M2 | TEMPERATURE: 96.8 F | WEIGHT: 195 LBS | HEIGHT: 72 IN | DIASTOLIC BLOOD PRESSURE: 64 MMHG

## 2020-09-28 DIAGNOSIS — M79.2 NEUROPATHIC PAIN: ICD-10-CM

## 2020-09-28 DIAGNOSIS — M54.41 ACUTE RIGHT-SIDED LOW BACK PAIN WITH RIGHT-SIDED SCIATICA: ICD-10-CM

## 2020-09-28 DIAGNOSIS — Z86.73 HISTORY OF STROKE: Primary | ICD-10-CM

## 2020-09-28 DIAGNOSIS — G81.94 LEFT HEMIPARESIS (HCC): ICD-10-CM

## 2020-09-28 DIAGNOSIS — M54.17 RIGHT LUMBOSACRAL RADICULOPATHY: ICD-10-CM

## 2020-09-28 DIAGNOSIS — R53.1 GENERALIZED WEAKNESS: ICD-10-CM

## 2020-09-28 PROCEDURE — 99214 OFFICE O/P EST MOD 30 MIN: CPT | Performed by: PHYSICAL MEDICINE & REHABILITATION

## 2020-09-28 RX ORDER — INSULIN GLARGINE 300 U/ML
30 INJECTION, SOLUTION SUBCUTANEOUS DAILY
COMMUNITY
Start: 2020-09-24 | End: 2021-05-19

## 2020-09-28 ASSESSMENT — ENCOUNTER SYMPTOMS
TINGLING: 1
SORE THROAT: 0
FEVER: 0
PALPITATIONS: 0
MEMORY LOSS: 0
FOCAL WEAKNESS: 1
CONSTIPATION: 0
WEAKNESS: 1
DIARRHEA: 0
COUGH: 0
CHILLS: 0
SHORTNESS OF BREATH: 0
SENSORY CHANGE: 1
BRUISES/BLEEDS EASILY: 0
BACK PAIN: 1
FALLS: 0

## 2020-09-28 ASSESSMENT — PATIENT HEALTH QUESTIONNAIRE - PHQ9: CLINICAL INTERPRETATION OF PHQ2 SCORE: 0

## 2020-09-28 ASSESSMENT — FIBROSIS 4 INDEX: FIB4 SCORE: 2.73

## 2020-09-28 NOTE — PROGRESS NOTES
[facility]  PM&R Neuro Rehabilitation Clinic  [address] [place] [address]  Ph: [phone]    FOLLOW UP OUTPATIENT EVALUATION    Patient Name: Av Bob   Patient : 1945  Patient Age: 75 y.o.     Examining Physician: Dr. Lorie Huff, DO    PM&R History to Date - Background Information:  Dates of Admission/Discharge to ARU: Larry Williamson - pavan 2017    SUBJECTIVE:   Patient Identification: Av Bob is a 75 y.o. 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  This has happened a couple of times in the past.  The first time was in  when he went to a high school reunion and was sitting in the bleachers for 3 hours.  At that time he did get an MRI of his lumbar spine and had had a remote history of injections prior to that.  He states the injections to his back helped him significantly, he does not remember the provider that did them.  This symptomatology occurred again when [place]. Got out of car in [place] and when got out to stretch legs that when it started hurting.  States it took many months for this to heal.  It finally did and had been pain free for about a year until 3 weeks ago when flared again. Starts in low back on right, down into hip. Also states has lower leg cramping and foot cramping. Pain travels from R low  back laterally into hip, posteriorly down proximal leg, posteriorly with cramping in lower leg, and right foot falls asleep. Cannot target specifically where numbness is.  States that numbness in right lower extremity only worsened with the acute onset of his back pain 3 weeks ago.  Might have intermittently had numbness in the past but significantly increased but this acute onset of low back pain 3 weeks ago. Percentage scale: 50% in back and 50% in leg. Mostly leg bothers him at night. Saw PCP and removed statin and increased Tylenol 1000mg TID. Sometimes the foot feels like it is falling asleep.  Complicating the clinical picture is that the patient has PVD in the right, declined angioplasty in the past. Patient states that he has no saddle anesthesia.  He states that his bowel movements are regular.  He states that he does have difficulty with urination in the sense that he wears adult briefs and sometimes urinates and has no idea he is doing so.  He does have history of BPH and is on bladder medications.    -On today's visit patient states that when he was last asked if he could pinpoint a specific pattern within the foot where he feels numbness he states that it is mostly in the great toe.    -There has been no significant events since we last visited a couple weeks ago.  - He continues to use a nonstandard wheelchair for locomotion primarily.      Review of Systems:  Review of Systems   Constitutional: Positive for malaise/fatigue. Negative for chills and fever.   HENT: Negative for congestion and sore throat.    Respiratory: Negative for cough and shortness of breath.    Cardiovascular: Negative for palpitations and leg swelling.   Gastrointestinal: Negative for constipation and diarrhea.   Genitourinary:        Urinary incontinence.   Musculoskeletal: Positive for back pain. Negative for falls and joint pain.   Neurological: Positive for tingling, sensory change, focal weakness and weakness.    Endo/Heme/Allergies: Does not bruise/bleed easily.   Psychiatric/Behavioral: Negative for memory loss.      All other pertinent positive review of systems are noted above in HPI.   All other systems reviewed and are negative.    Past Medical History:  Past Medical History:   Diagnosis Date   • Roberto hematuria 1/29/2020   • Influenza A 1/26/2020   • Leg edema, right 1/22/2020   • Acute on chronic renal failure ([facility]) 1/21/2020   • Renal mass 1/21/2020   • ([facility]) Chronic ulcer of right lower extremity with fat layer exposed 12/27/2018   • Enterococcal septicemia ([facility]) 8/12/2017   • Hypomagnesemia 08/12/2017    etiology uncertain   • NSTEMI (non-ST elevated myocardial infarction) ([facility]) 07/18/2017    complicating UTI with sepsis   • Acute respiratory failure with hypoxia ([facility]) 5/20/2017   • Septic shock ([facility]) 5/20/2017   • Normocytic hypochromic anemia 5/20/2017   • Lymphoma ([facility]) 2/19/2017    Large cell   • Cerebrovascular accident (CVA) ([facility]) 12/30/2016    Left arm weakness  etiology of stroke not established, lymphoma discovered on MRI evaluation of stroke, L hemiparesis much worse after acute infectious illness in mid 2017, but no specific diagnosed recurrent neurological etiology, all at [facility]   • Stroke ([facility]) 2016    left sided weakness   • Skin ulcer of calf ([facility]) 2015    Right, Dr. Terry and wound care   • Controlled gout 2014   • Polyneuropathy in diabetes(357.2) 9/11/2013   • Hypokalemia 2012    controlled with combination of ACE inhibitor or ARB plus spironolactone   • Wound of left leg 2012    Requiring surgery and debridment, Dr. Moore   • Nephrolithiasis 2006    right kidney subsequent lithotripsy by Dr. Barry   • CAD (coronary artery disease)     GIOVANNA to RCA in '97, GIOVANNA X2 to LAD and GIOVANNA X2 to OM in '09   • Cancer ([facility])     2017; chemo lympoma   • Cataract    • Chickenpox    • CKD (chronic kidney disease) stage 3, GFR 30-59 ml/min ([facility])    • Coronary atherosclerosis  of native coronary artery     S/P PTCA (percutaneous transluminal coronary angioplasty), RCA, 5/1997, patent on cath 7/10/2009 at the time of interventions on his left anterior descending and circumflex coronary arteries   • Daytime sleepiness    • Depression    • Diabetes (HCC)    • Difficulty swallowing    • Frequent urination    • GERD (gastroesophageal reflux disease)    • [place] measles    • Hypertension    • Insomnia    • Kidney stone    • Mixed hyperlipidemia    • Pain    • Peripheral vascular disease (HCC)    • Urinary bladder disorder    • Urinary incontinence    • Weakness       Past Surgical History:   Procedure Laterality Date   • CYSTOSCOPY STENT PLACEMENT Left 2/12/2018    Procedure: CYSTOSCOPY  ;  Surgeon: Rey Barry M.D.;  Location: SURGERY [facility];  Service: Urology   • URETEROSCOPY Left 2/12/2018    Procedure: URETEROSCOPY- FLEXIBLE  ;  Surgeon: Rey Barry M.D.;  Location: SURGERY [facility];  Service: Urology   • LASERTRIPSY Left 2/12/2018    Procedure: LASERTRIPSY - LITHO  ;  Surgeon: Rey Barry M.D.;  Location: SURGERY [facility];  Service: Urology   • WOUND CLOSURE GENERAL  4/3/2012    Performed by GERHARD GILBERT at SURGERY SAME DAY [facility] ORS   • [facility] CARDIAC CATH  2009    Stents to LAD, Om   • DAGOBERTO BY LAPAROSCOPY  1998   • ANGIOPLASTY  1997    RCA followed by other stents as noted above.    • Z CARDIAC CATH  1997    stent RCA   • CATARACT EXTRACTION WITH IOL      bilateral   • LITHOTRIPSY     • TONSILLECTOMY     • TONSILLECTOMY AND ADENOIDECTOMY          Current Outpatient Medications:   •  TOUJEO SOLOSTAR 300 UNIT/ML Solution Pen-injector, , Disp: , Rfl:   •  atorvastatin (LIPITOR) 80 MG tablet, Take 0.5 Tabs by mouth., Disp: 30 Tab, Rfl:   •  triamcinolone acetonide (KENALOG) 0.1 % Cream, , Disp: , Rfl:   •  allopurinol (ZYLOPRIM) 100 MG Tab, TAKE 1 TABLET BY MOUTH EVERY DAY, Disp: 90 Tab, Rfl: 0  •  clopidogrel (PLAVIX) 75 MG Tab, TAKE 1 TABLET BY MOUTH  EVERY DAY, Disp: 90 Tab, Rfl: 1  •  Insulin Pen Needle 32 G x 4 mm (BD PEN NEEDLE SWATI U/F), USE FOR INSULIN SHOTS FIVE TIMES DAILY, Disp: 500 Each, Rfl: 3  •  fluoxetine (PROZAC) 40 MG capsule, Take 1 Cap by mouth every day., Disp: 90 Cap, Rfl: 3  •  losartan (COZAAR) 50 MG Tab, Take 1 Tab by mouth 2 Times a Day., Disp: 180 Tab, Rfl: 3  •  TRULICITY 1.5 MG/0.5ML Solution Pen-injector, INJECT CONTENTS OF 1 SYRINGE SUBCUTANEOUSLY EVERY 7 DAYS ON TUESDAY, Disp: 6 mL, Rfl: 1  •  metoprolol SR (TOPROL XL) 100 MG TABLET SR 24 HR, Take 1 Tab by mouth every day., Disp: 90 Tab, Rfl: 3  •  amLODIPine (NORVASC) 5 MG Tab, Take 1 Tab by mouth every day., Disp: 90 Tab, Rfl: 3  •  Multiple Vitamin (MULTI VITAMIN MENS PO), Take  by mouth., Disp: , Rfl:   •  potassium chloride (KLOR-CON) 8 MEQ tablet, TAKE 1 TABLET BY MOUTH EVERY DAY, Disp: 90 Tab, Rfl: 3  •  ONE TOUCH ULTRA TEST strip, USE TO TEST FIVE TIMES DAILY, Disp: 600 Strip, Rfl: 1  •  aspirin EC (ECOTRIN) 81 MG Tablet Delayed Response, Take 81 mg by mouth every day., Disp: , Rfl:   •  Probiotic Product (PROBIOTIC DAILY PO), Take 1 Cap by mouth 2 Times a Day., Disp: , Rfl:   •  magnesium oxide (MAG-OX) 400 MG Tab, Take 400 mg by mouth every day., Disp: , Rfl:   •  finasteride (PROSCAR) 5 MG Tab, Take 1 Tab by mouth every day., Disp: 30 Tab, Rfl: 0  •  fenofibrate (TRICOR) 145 MG Tab, Take 145 mg by mouth every evening., Disp: , Rfl:   •  alfuzosin (UROXATRAL) 10 MG SR tablet, Take 10 mg by mouth every day., Disp: , Rfl:   •  methenamine hip (HIPPREX) 1 GM Tab, Take 1 g by mouth 2 times a day., Disp: , Rfl:   •  Insulin Glargine (TOUJEO MAX SOLOSTAR) 300 UNIT/ML Solution Pen-injector, Inject 28 Units as instructed every bedtime., Disp: , Rfl:   •  insulin lispro, Human, (HUMALOG KWIKPEN) 100 UNIT/ML Solution Pen-injector injection, Inject 5-9 Units as instructed 3 times a day before meals. Sliding Scale, Disp: , Rfl:   •  acetaminophen (TYLENOL) 500 MG Tab, Take 1,000 mg by  mouth every 6 hours as needed for Mild Pain or Moderate Pain., Disp: , Rfl:   •  Cholecalciferol (VITAMIN D) 2000 units Cap, Take 4,000 Units by mouth 2 Times a Day., Disp: , Rfl:   Allergies   Allergen Reactions   • Diphenhydramine Hcl Anxiety     Pt is able to tolerate  Mg benadryl with less anxiety   • Lorazepam Unspecified     Disorientation   • Ciprofloxacin      Rash,stomach ache   • Spironolactone      Acute kidney injury        Past Social History:  Social History     Socioeconomic History   • Marital status:      Spouse name: Not on file   • Number of children: Not on file   • Years of education: Not on file   • Highest education level: Not on file   Occupational History   • Not on file   Social Needs   • Financial resource strain: Not on file   • Food insecurity     Worry: Not on file     Inability: Not on file   • Transportation needs     Medical: Not on file     Non-medical: Not on file   Tobacco Use   • Smoking status: Never Smoker   • Smokeless tobacco: Never Used   • Tobacco comment: continued abstinence   Substance and Sexual Activity   • Alcohol use: Never   • Drug use: Never   • Sexual activity: Not Currently     Partners: Female     Comment: , one daughter, 2 grands   Lifestyle   • Physical activity     Days per week: Not on file     Minutes per session: Not on file   • Stress: Not on file   Relationships   • Social connections     Talks on phone: Not on file     Gets together: Not on file     Attends Muslim service: Not on file     Active member of club or organization: Not on file     Attends meetings of clubs or organizations: Not on file     Relationship status: Not on file   • Intimate partner violence     Fear of current or ex partner: Not on file     Emotionally abused: Not on file     Physically abused: Not on file     Forced sexual activity: Not on file   Other Topics Concern   • Not on file   Social History Narrative    Av is from [place], [place] and raised in  [place]. He has been in [place] since 2004. 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. He retired in his early 60's.  He has been  to Usha since 2003, they met through a mutual friend. He has a daughter (Kalina) and a step son ([place]imi).        Family History:  Family History   Problem Relation Age of Onset   • Heart Disease Father         CAD   • Diabetes Father    • Cancer Mother    • Psychiatric Illness Mother         Depression   • Depression Mother    • Kidney stones Brother    • Heart Disease Brother    • Psychiatric Illness Brother         Depression   • Depression Brother    • Suicide Attempts Other    • Psychiatric Illness Other         autism       Depression and Opioid Screening  PHQ-9:  Depression Screen (PHQ-2/PHQ-9) 1/22/2020 9/14/2020 9/28/2020   PHQ-2 Total Score 0 - -   PHQ-2 Total Score - - -   PHQ-2 Total Score - 0 0   PHQ-9 Total Score - - -     Interpretation of PHQ-9 Total Score   Score Severity   1-4 No Depression   5-9 Mild Depression   10-14 Moderate Depression   15-19 Moderately Severe Depression   20-27 Severe Depression     OBJECTIVE:   Vital Signs:  Vitals:    09/28/20 1050   BP: 108/64   Pulse: 65   Temp: 36 °C (96.8 °F)   SpO2: 99%        Pertinent Labs:  Lab Results   Component Value Date/Time    SODIUM 138 08/17/2020 02:29 PM    POTASSIUM 3.7 08/17/2020 02:29 PM    CHLORIDE 103 08/17/2020 02:29 PM    CO2 22 08/17/2020 02:29 PM    GLUCOSE 91 08/17/2020 02:29 PM    BUN 31 (H) 08/17/2020 02:29 PM    CREATININE 1.44 (H) 08/17/2020 02:29 PM    CREATININE 1.4 05/28/2008 05:42 PM    BUNCREATRAT 10 03/27/2017 02:11 PM       Lab Results   Component Value Date/Time    HBA1C 5.8 (H) 06/19/2020 08:14 AM       Lab Results   Component Value Date/Time    WBC 9.5 08/17/2020 02:29 PM    RBC  4.19 (L) 08/17/2020 02:29 PM    HEMOGLOBIN 12.0 (L) 08/17/2020 02:29 PM    HEMATOCRIT 37.1 (L) 08/17/2020 02:29 PM    MCV 88.5 08/17/2020 02:29 PM    MCH 28.6 08/17/2020 02:29 PM    MCHC 32.3 (L) 08/17/2020 02:29 PM    MPV 10.2 08/17/2020 02:29 PM    NEUTSPOLYS 91.70 (H) 01/26/2020 04:39 PM    LYMPHOCYTES 1.60 (L) 01/26/2020 04:39 PM    MONOCYTES 4.30 01/26/2020 04:39 PM    EOSINOPHILS 1.10 01/26/2020 04:39 PM    BASOPHILS 0.20 01/26/2020 04:39 PM    HYPOCHROMIA 1+ 03/21/2012 01:08 PM       Lab Results   Component Value Date/Time    ASTSGOT 59 (H) 01/26/2020 04:39 PM    ALTSGPT 33 01/26/2020 04:39 PM        Physical Exam:   GEN: No apparent distress  HEENT: Head normocephalic, atraumatic.  Sclera nonicteric bilaterally, no ocular discharge appreciated bilaterally.  Mask donned.  CV: Extremities warm and well-perfused, no peripheral edema appreciated bilaterally.  PULMONARY: Breathing nonlabored on room air, no respiratory accessory muscle use.  Not requiring supplemental oxygen.  ABD: Soft, nontender.  SKIN: No appreciable skin breakdown on exposed areas of skin.  Superficial ecchymosis appreciable.  PSYCH: Mood and affect within normal limits.  MSK: Patient does have limitation of full left knee extension due lacking approximately 5 to 10 degrees.  Left hip remains externally rotated at baseline.  No tenderness to palpation along the right low back, hip in all areas (bursa, gluteus medius insertion, etc.).  Straight leg raise negative on the right.  Unable to fully assess on the left due to limitation in ability to fully extend left knee.  Slump test negative.  Negative facet loading.  NEURO: Awake alert.  Conversational.  Logical thought content.  Answers questions appropriately, does look to wife for several answers.    Motor Exam Upper Extremities   ? Myotome R L   Shoulder Abduction C5 5 2   Elbow flexion C5 5 4   Wrist extension C6 5 2**   Elbow extension C7 5 4   Finger flexion C8 5 4-   Finger abduction T1 5  4-   **Limited by wrist flexor tone    Motor Exam Lower Extremities  ? Myotome R L   Hip flexion L2 5 3+   Knee extension L3 5 3-   Ankle dorsiflexion L4 5 4-   Toe extension L5 5 NT (shoe donned)   Ankle plantarflexion S1 5 4-       Tone on Modified Deuce Scale    R L  R L   Elbow extension (testing tone of elbow flexors)  1+ Hip extension (testing hip flexors)  NT   Elbow flexion (testing tone of elbow extensors)  0 Hip abduction (testing adductors)  1   Wrist extension (testing tone of wrist flexors)   2 Knee extension (testing knee flexors)  1*   Finger extension (testing tone of finger flexors)  2 Knee flexion (testing knee extensors)  0   Supination (testing forearm pronators)  NT Dorsiflexion (testing plantarflexors)  0      Plantarflexion (testing dorsiflexors)  0   *Exam limited by tight hamstrings limiting full extension  Luo's negative bilaterally.  No clonus appreciated at BL ankles.     Imaging:   MRI L Spine 2020  1.  Multifocal degenerative disease in the lumbar spine as described above.  2.  Bilateral hydronephrosis and hydroureter. This is noted on the previous CT scan dated 1/21/2020.  3.  There is small focus of sclerosis in the L4 vertebral body. This is new since the previous study. This finding likely secondary to the degeneration. However if there is a history of carcinoma the possibility of sclerotic metastasis cannot be   excluded.    ASSESSMENT/PLAN: Av Bob  is a 75 y.o. male with rehabilitation history significant for R CVA 12/31/16 with residual left hemiparesis, general debility, here for evaluation. The following plan was discussed with the patient who is in agreement.     Visit Diagnoses     ICD-10-CM   1. History of stroke  Z86.73   2. Acute right-sided low back pain with right-sided sciatica  M54.41   3. Right lumbosacral radiculopathy  M54.17   4. Generalized weakness  R53.1   5. Left hemiparesis (HCC)  G81.94   6. Neuropathic pain  M79.2        Rehab/Neuro:    1. 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  States level of debility is out of proportion for how well he recovered after stroke. Even before norovirus virus, while in chemo was able to go to [place], no assistive device. Can use walker a little bit, but fatigues. Also can walk with cane with exercise  or therapy. Last PT eval walked 6 min with cane. Recommend SBA.   2. General debility: ?  CIP/CIM when acutely hospitalized several years ago with norovirus  3. Fatigue: Secondary to debility vs post stroke fatigue vs sleep apnea versus combination of all of the aforementioned.  Will be getting sleep study and CPAP.  Also has low T, followed by endocrinology.  -On Plavix/ASA  -Primarily uses non-custom wheelchair, occasionally ambulates with walker or cane.  Limited by fatigue.  -Counseled on use of EMG/NCS to evaluate because of lower extremity debility given he had previously recovered well from his stroke prior to infection with norovirus.    Assessment of Current Functional Status: Patient had made a remarkable recovery after his initial stroke in December 2016.  Wife reports that he was ambulatory without assistive device and largely had no deficits.  He even progressed to the point where he was able to perform complex balance tasks with therapy.  However, patient did suffer several medical setbacks, one including prolonged hospitalization after developing norovirus and it was at this point that he became severely debilitated requiring assistive device use as well as wheelchair.  Patient's course also complicated by diagnosis of Hodgkin's lymphoma for which he received chemotherapy shortly after his stroke.  However, wife reports that he still was able to remain independent and even travel to [place] prior to his hospitalization for norovirus.  Currently limiting his  continued therapy is his right low back pain and radicular symptoms (see below neuropathic/nociceptive pain section).    Spasticity: Significant in the right upper extremity, more minimal in the left lower extremity.  Affects wrist flexors primarily.  Concern for hamstring spasticity limiting full range of motion of the left knee extension.  -Counseled on the importance of continuing stretching and using splint; wife reports patient nonadherent to self stretching program.  -Counseled on potential Botox injections should spasticity worsen, but recommend aggressive stretching on his part right now  -Counseled would not start pharmacologic agent at this time given potential side effects, fall risk etc. of these medications.    Neuropathic Pain/Nociceptive Pain: Right back/hip/leg pain with cramping/numbness - suspect potential lumbo-sacral radiculitis.  No significant motor deficit on the right.  Last lumbar MRI 2005.  Denies saddle anesthesia but does state have urinary incontinence which he cannot sense, but mostly has sensation of full bladder and bowel.  Complicated by PVD, patient declined angioplasty.  -MRI revealed diffuse disc bulge at L3-4,4-5.  Due to software malfunction could not upload MRI from 2005, but per the radiology report this does appear worsened compared to that time.  -REFERRAL to interventional spine service for evaluation of potential OSMAN with concurrent physical therapy given symptoms are interfering with sleep and ability to participate with therapy.  -Counseled on red flag symptoms and when to present to the emergency department.  -Counseled do not recommend potential neuropathic pain agents at this time given potential negative side effects for patient with significant comorbidities.  Recommend topical treatments as opposed to oral.  -Discussed with patient there is area of sclerosis in 1 of the vertebral bodies per the MRI report and should follow-up with oncology regarding  this.    Neurogenic Bladder: Chronic urinary incontinence in setting of BPH.  Followed by urology.  - On Proscar  - Followed by [facility] [place] (Dr. Barry)    Sleep: Impaired secondary to right lower extremity and back symptoms.   -Recommend lidocaine patch over-the-counter to attempt to help with sleep at this time.  -Okay for Voltaren gel which patient already has, though counseled there is some minimal systemic resorption  -Counseled would like to avoid medicating for sleep until we figure out etiology of pain.    Mood: On Prozac 40mg daily.     Follow up: 3 months    Please note that this dictation was created using voice recognition software. I have made every reasonable attempt to correct obvious errors but there may be errors of grammar and content that I may have overlooked prior to finalization of this note.    Dr. Lorie Huff DO, MS  Department of Physical Medicine & Rehabilitation  Neuro Rehabilitation Clinic  [facility]

## 2020-09-28 NOTE — RESULT ENCOUNTER NOTE
Reviewed MRI with patient. Cannot open 2005 MRI for comparison images - images would not load. C/f radiculitis. Refer to interventional pain/spine for eval for OSMAN.

## 2020-09-29 ENCOUNTER — NON-PROVIDER VISIT (OUTPATIENT)
Dept: MEDICAL GROUP | Facility: MEDICAL CENTER | Age: 75
End: 2020-09-29
Payer: MEDICARE

## 2020-09-29 DIAGNOSIS — Z23 NEED FOR INFLUENZA VACCINATION: ICD-10-CM

## 2020-09-29 PROCEDURE — G0008 ADMIN INFLUENZA VIRUS VAC: HCPCS | Performed by: INTERNAL MEDICINE

## 2020-09-29 PROCEDURE — 90662 IIV NO PRSV INCREASED AG IM: CPT | Performed by: INTERNAL MEDICINE

## 2020-09-29 NOTE — NON-PROVIDER
"Av Bob is a 75 y.o. male here for a non-provider visit for:   FLU    Reason for immunization: Annual Flu Vaccine  Immunization records indicate need for vaccine: Yes, confirmed with Epic  Minimum interval has been met for this vaccine: Yes  ABN completed: No    Order and dose verified by: eo  VIS Dated  [id number] was given to patient: Yes  All IAC Questionnaire questions were answered \"No.\"    Patient tolerated injection and no adverse effects were observed or reported: Yes    Pt scheduled for next dose in series: No  "

## 2020-10-01 ENCOUNTER — TELEPHONE (OUTPATIENT)
Dept: PHYSICAL MEDICINE AND REHAB | Facility: REHABILITATION | Age: 75
End: 2020-10-01

## 2020-10-01 NOTE — TELEPHONE ENCOUNTER
Usha left [facility] he has an appt with Dr. Bennett but it is not for about a month and a half. She wants to know if Av should be sooner. She said they put him on wait list to be sooner if something comes up.

## 2020-10-10 ENCOUNTER — SLEEP STUDY (OUTPATIENT)
Dept: SLEEP MEDICINE | Facility: MEDICAL CENTER | Age: 75
End: 2020-10-10
Attending: INTERNAL MEDICINE
Payer: MEDICARE

## 2020-10-10 DIAGNOSIS — G47.33 OSA (OBSTRUCTIVE SLEEP APNEA): ICD-10-CM

## 2020-10-12 ENCOUNTER — APPOINTMENT (RX ONLY)
Dept: URBAN - METROPOLITAN AREA CLINIC 35 | Facility: CLINIC | Age: 75
Setting detail: DERMATOLOGY
End: 2020-10-12

## 2020-10-12 DIAGNOSIS — F42.4 EXCORIATION (SKIN-PICKING) DISORDER: ICD-10-CM

## 2020-10-12 PROBLEM — S50.912A UNSPECIFIED SUPERFICIAL INJURY OF LEFT FOREARM, INITIAL ENCOUNTER: Status: ACTIVE | Noted: 2020-10-12

## 2020-10-12 PROCEDURE — 99212 OFFICE O/P EST SF 10 MIN: CPT

## 2020-10-12 PROCEDURE — ? TREATMENT REGIMEN

## 2020-10-12 PROCEDURE — ? COUNSELING

## 2020-10-12 PROCEDURE — 95810 POLYSOM 6/> YRS 4/> PARAM: CPT | Performed by: FAMILY MEDICINE

## 2020-10-12 ASSESSMENT — LOCATION SIMPLE DESCRIPTION DERM: LOCATION SIMPLE: LEFT FOREARM

## 2020-10-12 ASSESSMENT — LOCATION ZONE DERM: LOCATION ZONE: ARM

## 2020-10-12 ASSESSMENT — LOCATION DETAILED DESCRIPTION DERM: LOCATION DETAILED: LEFT PROXIMAL RADIAL DORSAL FOREARM

## 2020-10-12 NOTE — PROCEDURES
Technical summary:The patient underwent a diagnostic polysomnogram. This was a 16 channel montage study to include a 6 channel EEG, a 2 channel EOG, and chin EMG, left and right leg EMG, a snore channel, a nasal pressure transducer, and a nasal oral airflow thermistor.   Respiratory effort was assessed with the use of a thoracic and abdominal monitor and overnight oximetry was obtained. Audio and video recordings were reviewed. This was a fully attended study and sleep stage scoring was performed. The test was technically adequate.       Scoring Criteria: A modification of the the AASM Manual for the Scoring of Sleep and Associated Events. Obstructive apnea was scored by cessation of airflow for at least 10 seconds with continuing respiratory effort.  Central apnea was scored by cessation of airflow for at least 10 seconds with no effort.  Hypopnea was scored by a 30% or more reduction in airflow for at least 10 seconds accompanied by an arterial oxygen desaturation of 3% or more.  (For Medicare patients, hypopneas were scored by a 30% or more reduction in airflow for at least 10 seconds accompanied by an arterial oxygen saturation of 4% or more, as required by their insurance, CMS.    Interpretation:  Study start time was 10:38:00 PM. Diagnostic recording time was 7h 8.5m with a total sleep time of 2h 47.5m resulting in a sleep efficiency of 39.09%%.   Sleep latency from the start of the study was 04 minutes and the latency from sleep to REM was 00 minutes.In total,244 arousals were scored for an arousal index of 87.4.    Respiratory:  There were a total of 141 apneas consisting of 96 obstructive apneas, 0 mixed apneas, and 45 central apneas. A total of 85 hypopneas were scored.  The apnea index was 50.51 per hour and the hypopnea index was 30.45 per hour resulting in an overall AHI of 80.96.  AHI during rem was 0.0 and AHI while supine was 79.76.  10% of the apneas were central apneas    Oximetry:  There was a  mean oxygen saturation of 93.0% with a minimum oxygen saturation of 68.0%. Time spent with oxygen saturations below 89% was 80.9 minutes.    Cardiac:  The highest heart rate seen while awake was 129 BPM while the highest heart rate during sleep was 79 BPM with an average sleeping heart rate of 68 BPM.    Limb Movements:  There were a total of 75 PLMs during sleep, of which 50 were PLMS arousals. This resulted in a PLMS index of 26.9 and a PLMS arousal index of 17.9.    Impression:  1.  Severe OAS with AHI of 81/hr and O2 bartolo 68 %  2.  Sleep related hypoxia  3.  PVC      Recommendations:  Due to poor sleep maintenance, he was not able to have a split night study. Recommend the patient return for a CPAP titration. In some cases alternative treatment options may prove effective in resolving sleep apnea and these options include upper airway surgery, the use of a dental orthotic or weight loss and positional therapy. Clinical correlation is required. In general patients with sleep apnea are advised to avoid alcohol and sedatives and to not operate a motor vehicle while drowsy and are at a greater risk for cardiovascular disease.

## 2020-10-15 ENCOUNTER — OFFICE VISIT (OUTPATIENT)
Dept: PHYSICAL MEDICINE AND REHAB | Facility: MEDICAL CENTER | Age: 75
End: 2020-10-15
Payer: MEDICARE

## 2020-10-15 VITALS
SYSTOLIC BLOOD PRESSURE: 120 MMHG | HEIGHT: 72 IN | HEART RATE: 61 BPM | WEIGHT: 195 LBS | BODY MASS INDEX: 26.41 KG/M2 | DIASTOLIC BLOOD PRESSURE: 80 MMHG | OXYGEN SATURATION: 99 % | TEMPERATURE: 97.2 F

## 2020-10-15 DIAGNOSIS — M54.41 CHRONIC RIGHT-SIDED LOW BACK PAIN WITH RIGHT-SIDED SCIATICA: ICD-10-CM

## 2020-10-15 DIAGNOSIS — G89.29 CHRONIC RIGHT-SIDED LOW BACK PAIN WITH RIGHT-SIDED SCIATICA: ICD-10-CM

## 2020-10-15 DIAGNOSIS — M54.41 ACUTE RIGHT-SIDED LOW BACK PAIN WITH RIGHT-SIDED SCIATICA: ICD-10-CM

## 2020-10-15 DIAGNOSIS — M54.16 LUMBAR RADICULOPATHY: ICD-10-CM

## 2020-10-15 PROCEDURE — 99215 OFFICE O/P EST HI 40 MIN: CPT | Performed by: PHYSICAL MEDICINE & REHABILITATION

## 2020-10-15 ASSESSMENT — FIBROSIS 4 INDEX: FIB4 SCORE: 2.73

## 2020-10-15 ASSESSMENT — PATIENT HEALTH QUESTIONNAIRE - PHQ9
5. POOR APPETITE OR OVEREATING: 0 - NOT AT ALL
CLINICAL INTERPRETATION OF PHQ2 SCORE: 0

## 2020-10-15 ASSESSMENT — PAIN SCALES - GENERAL: PAINLEVEL: 5=MODERATE PAIN

## 2020-10-15 NOTE — PATIENT INSTRUCTIONS
Your procedure will be at the [facility] special procedure suite.    [facility] [place] [address]       PRE-PROCEDURE INSTRUCTIONS  You may take your regular medications except:   · No Anti-inflammatories 5 days prior to your procedure. Anti-inflammatories include medicines such as  ibuprofen (Motrin, Advil), Excedrin, Naproxen (Aleve, Anaprox, Naprelan, Naprosyn), Celecoxib (Celebrex), Diclofenac (Voltaren-XR tab), and Meloxicam (Mobic).   · No Glucophage or Metformin 24 hours before your procedure. You may resume next day after your procedure.  · Call the physiatry office if you are taking or prescribed anti-biotics within five days of procedure.  · Please ask provider if you are taking any new diabetes medication.  · CONTINUE TAKING BLOOD PRESSURE MEDICATIONS AS PRESCRIBED.  · Pain medications will not be prescribed on the procedure day. Procedural pain medication may be used by your provider   · Call your doctor's office performing the procedure if you have a fever, chills, rash or new illness prior to your procedure    Anticoagulation/antiplatelet medications  No Blood thinning medications such as Coumadin or Plavix 5 days prior to procedure unless your doctor said to continue these medications. Call your doctor if a new medication is prescribed in this class.     Restrictions for eating before procedure:   · If you are getting procedural sedation, then do not eat to for 8 hours prior to procedure appointment time. Do not drink fluids for four hours prior to your procedure time.   · If you are not having procedural sedation, then Skip the meal prior to your procedure. If you have a morning procedure then skip breakfast. If you have an afternoon procedure then skip lunch.   · You may drink clear liquids up to 2 hours prior to your procedure  · You must have a  the day of procedure to accompany you home.      POST PROCEDURE INSTRUCTIONS   · No heavy lifting, strenuous bending or  strenuous exercise for 3 days after your procedure.  · No hot tubs, baths, swimming for 3 days after your procedure  · You can remove the bandage the day after the procedure.  · IF YOU RECEIVED A STEROID INJECTION. PLEASE NOTE THAT THERE MAY BE A DELAY FOR THE INJECTION TO START WORKING, THE DELAY MAY BE UP TO TWO WEEKS. IF YOU HAVE DIABETES, PLEASE NOTE THAT YOUR SUGAR LEVELS MAY BE ELEVATED FOR 1-2 DAYS AFTER A STEROID INJECTION.  THE STEROID MAY CAUSE TEMPORARY SYMPTOMS WHICH USUALLY RESOLVE ON THEIR OWN WITHIN 1 TO 2 DAYS INCLUDING FACIAL FLUSHING OR A FEELING OF WARMTH ON THE FACE, TEMPORARY INCREASES IN BLOOD SUGAR, INSOMNIA, INCREASED HUNGER  · IF YOU EXPERIENCE PROLONGED WEAKNESS LONGER THAN ONE DAY, BOWEL OR BLADDER INCONTINENCE THEN PLEASE CALL THE PHYSIATRY OFFICE.  · Your leg may feel heavy, weak and numb for up to 1-2 days. Be very careful walking.   ·  You may resume normal activities 3 days after procedure.

## 2020-10-15 NOTE — PROGRESS NOTES
New patient note (this is billed as a follow-up visit as the patient was previously seen by Dr. Lorie Huff)    Physiatry (physical medicine and  Rehabilitation), interventional spine and sports medicine    Date of service: See epic    Chief complaint:   Chief Complaint   Patient presents with   • New Patient     back pain        Referring provider: Lorie Huff D.O.     HISTORY    HPI: Av Bob 75 y.o.  who presents today with Diagnoses of Lumbar radiculopathy, Acute right-sided low back pain with right-sided sciatica, and Chronic right-sided low back pain with right-sided sciatica were pertinent to this visit.    HPI    Acute on chronic right low back pain radiating both the anterior and anterolateral and posterior lateral aspect of the right leg down towards the foot and ankle towards the medial malleolus.  He has had similar episodes in the past many years ago which responded quite well to epidural steroid injections.  This is been gradually worsening over the past several months with functional deficit and difficulty with walking in his ADLs.  His wife assist with lower body dressing.       Medical records review:  I reviewed the note from the referring provider Lorie Huff D.O. including the note dated 9/28/2020 with multiple issues addressed regarding the patient's stroke general debility and fatigue.  Neurogenic bladder was addressed.  MRI of the lumbar spine was previously ordered.  The patient was referred to Dr. Bennett for evaluation for consideration of interventional procedure.           ROS:   Red Flags ROS:   Fever, Chills, Sweats: Denies  Involuntary Weight Loss: Denies  Bladder Incontinence: Denies  Bowel Incontinence: denies  Saddle Anesthesia: Denies    All other systems reviewed and negative.       PMHx:   Past Medical History:   Diagnosis Date   • (HCC) Chronic ulcer of right lower extremity with fat layer exposed 12/27/2018   • Acute on chronic renal failure  ([facility]) 1/21/2020   • Acute respiratory failure with hypoxia ([facility]) 5/20/2017   • CAD (coronary artery disease)     GIOVANNA to RCA in '97, GIOVANNA X2 to LAD and GIOVANNA X2 to OM in '09   • Cancer ([facility])     2017; chemo lympoma   • Cataract    • Cerebrovascular accident (CVA) ([facility]) 12/30/2016    Left arm weakness  etiology of stroke not established, lymphoma discovered on MRI evaluation of stroke, L hemiparesis much worse after acute infectious illness in mid 2017, but no specific diagnosed recurrent neurological etiology, all at [facility]   • Chickenpox    • CKD (chronic kidney disease) stage 3, GFR 30-59 ml/min    • Controlled gout 2014   • Coronary atherosclerosis of native coronary artery     S/P PTCA (percutaneous transluminal coronary angioplasty), RCA, 5/1997, patent on cath 7/10/2009 at the time of interventions on his left anterior descending and circumflex coronary arteries   • Daytime sleepiness    • Depression    • Diabetes ([facility])    • Difficulty swallowing    • Enterococcal septicemia ([facility]) 8/12/2017   • Roberto hematuria 1/29/2020   • Frequent urination    • GERD (gastroesophageal reflux disease)    • Thai measles    • Hypertension    • Hypokalemia 2012    controlled with combination of ACE inhibitor or ARB plus spironolactone   • Hypomagnesemia 08/12/2017    etiology uncertain   • Influenza A 1/26/2020   • Insomnia    • Kidney stone    • Leg edema, right 1/22/2020   • 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 Barry   • Normocytic hypochromic anemia 5/20/2017   • NSTEMI (non-ST elevated myocardial infarction) ([facility]) 07/18/2017    complicating UTI with sepsis   • Pain    • Peripheral vascular disease ([facility])    • Polyneuropathy in diabetes(357.2) 9/11/2013   • Renal mass 1/21/2020   • Septic shock ([facility]) 5/20/2017   • Skin ulcer of calf ([facility]) 2015    Right, Dr. Terry and wound care   • Stroke  ([facility]) 2016    left sided weakness   • Urinary bladder disorder    • Urinary incontinence    • Weakness    • Wound of left leg 2012    Requiring surgery and debridment, Dr. Moore         Current Outpatient Medications on File Prior to Visit   Medication Sig Dispense Refill   • atorvastatin (LIPITOR) 40 MG Tab Take 1 Tab by mouth every evening. 90 Tab 3   • triamcinolone acetonide (KENALOG) 0.1 % Cream      • allopurinol (ZYLOPRIM) 100 MG Tab TAKE 1 TABLET BY MOUTH EVERY DAY 90 Tab 0   • clopidogrel (PLAVIX) 75 MG Tab TAKE 1 TABLET BY MOUTH EVERY DAY 90 Tab 1   • fluoxetine (PROZAC) 40 MG capsule Take 1 Cap by mouth every day. 90 Cap 3   • losartan (COZAAR) 50 MG Tab Take 1 Tab by mouth 2 Times a Day. 180 Tab 3   • metoprolol SR (TOPROL XL) 100 MG TABLET SR 24 HR Take 1 Tab by mouth every day. 90 Tab 3   • amLODIPine (NORVASC) 5 MG Tab Take 1 Tab by mouth every day. 90 Tab 3   • Multiple Vitamin (MULTI VITAMIN MENS PO) Take  by mouth.     • potassium chloride (KLOR-CON) 8 MEQ tablet TAKE 1 TABLET BY MOUTH EVERY DAY 90 Tab 3   • aspirin EC (ECOTRIN) 81 MG Tablet Delayed Response Take 81 mg by mouth every day.     • Probiotic Product (PROBIOTIC DAILY PO) Take 1 Cap by mouth 2 Times a Day.     • magnesium oxide (MAG-OX) 400 MG Tab Take 400 mg by mouth every day.     • finasteride (PROSCAR) 5 MG Tab Take 1 Tab by mouth every day. 30 Tab 0   • fenofibrate (TRICOR) 145 MG Tab Take 145 mg by mouth every evening.     • alfuzosin (UROXATRAL) 10 MG SR tablet Take 10 mg by mouth every day.     • methenamine hip (HIPPREX) 1 GM Tab Take 1 g by mouth 2 times a day.     • Insulin Glargine (TOUJEO MAX SOLOSTAR) 300 UNIT/ML Solution Pen-injector Inject 28 Units as instructed every bedtime.     • insulin lispro, Human, (HUMALOG KWIKPEN) 100 UNIT/ML Solution Pen-injector injection Inject 5-9 Units as instructed 3 times a day before meals. Sliding Scale     • acetaminophen (TYLENOL) 500 MG Tab Take 1,000 mg by mouth every 6 hours as  needed for Mild Pain or Moderate Pain.     • Cholecalciferol (VITAMIN D) 2000 units Cap Take 4,000 Units by mouth 2 Times a Day.     • TOUJEO SOLOSTAR 300 UNIT/ML Solution Pen-injector      • Insulin Pen Needle 32 G x 4 mm (BD PEN NEEDLE SWATI U/F) USE FOR INSULIN SHOTS FIVE TIMES DAILY 500 Each 3   • TRULICITY 1.5 MG/0.5ML Solution Pen-injector INJECT CONTENTS OF 1 SYRINGE SUBCUTANEOUSLY EVERY 7 DAYS ON TUESDAY 6 mL 1   • ONE TOUCH ULTRA TEST strip USE TO TEST FIVE TIMES DAILY 600 Strip 1     No current facility-administered medications on file prior to visit.         PSHx:   Past Surgical History:   Procedure Laterality Date   • CYSTOSCOPY STENT PLACEMENT Left 2/12/2018    Procedure: CYSTOSCOPY  ;  Surgeon: Rey Barry M.D.;  Location: SURGERY [facility];  Service: Urology   • URETEROSCOPY Left 2/12/2018    Procedure: URETEROSCOPY- FLEXIBLE  ;  Surgeon: Rey Barry M.D.;  Location: SURGERY [facility];  Service: Urology   • LASERTRIPSY Left 2/12/2018    Procedure: LASERTRIPSY - LITHO  ;  Surgeon: Rey Barry M.D.;  Location: SURGERY [facility];  Service: Urology   • WOUND CLOSURE GENERAL  4/3/2012    Performed by GERHARD GILBERT at SURGERY SAME DAY [facility] ORS   • Z CARDIAC CATH  2009    Stents to LAD, Om   • DAGOBERTO BY LAPAROSCOPY  1998   • ANGIOPLASTY  1997    RCA followed by other stents as noted above.    • ZZZ CARDIAC CATH  1997    stent RCA   • CATARACT EXTRACTION WITH IOL      bilateral   • LITHOTRIPSY     • TONSILLECTOMY     • TONSILLECTOMY AND ADENOIDECTOMY         Family history   Family History   Problem Relation Age of Onset   • Heart Disease Father         CAD   • Diabetes Father    • Cancer Mother    • Psychiatric Illness Mother         Depression   • Depression Mother    • Kidney stones Brother    • Heart Disease Brother    • Psychiatric Illness Brother         Depression   • Depression Brother    • Suicide Attempts Other    • Psychiatric Illness Other         autism          Medications: reviewed on epic.   Outpatient Medications Marked as Taking for the 10/15/20 encounter (Office Visit) with Harpal Bennett M.D.   Medication Sig Dispense Refill   • atorvastatin (LIPITOR) 40 MG Tab Take 1 Tab by mouth every evening. 90 Tab 3   • triamcinolone acetonide (KENALOG) 0.1 % Cream      • allopurinol (ZYLOPRIM) 100 MG Tab TAKE 1 TABLET BY MOUTH EVERY DAY 90 Tab 0   • clopidogrel (PLAVIX) 75 MG Tab TAKE 1 TABLET BY MOUTH EVERY DAY 90 Tab 1   • fluoxetine (PROZAC) 40 MG capsule Take 1 Cap by mouth every day. 90 Cap 3   • losartan (COZAAR) 50 MG Tab Take 1 Tab by mouth 2 Times a Day. 180 Tab 3   • metoprolol SR (TOPROL XL) 100 MG TABLET SR 24 HR Take 1 Tab by mouth every day. 90 Tab 3   • amLODIPine (NORVASC) 5 MG Tab Take 1 Tab by mouth every day. 90 Tab 3   • Multiple Vitamin (MULTI VITAMIN MENS PO) Take  by mouth.     • potassium chloride (KLOR-CON) 8 MEQ tablet TAKE 1 TABLET BY MOUTH EVERY DAY 90 Tab 3   • aspirin EC (ECOTRIN) 81 MG Tablet Delayed Response Take 81 mg by mouth every day.     • Probiotic Product (PROBIOTIC DAILY PO) Take 1 Cap by mouth 2 Times a Day.     • magnesium oxide (MAG-OX) 400 MG Tab Take 400 mg by mouth every day.     • finasteride (PROSCAR) 5 MG Tab Take 1 Tab by mouth every day. 30 Tab 0   • fenofibrate (TRICOR) 145 MG Tab Take 145 mg by mouth every evening.     • alfuzosin (UROXATRAL) 10 MG SR tablet Take 10 mg by mouth every day.     • methenamine hip (HIPPREX) 1 GM Tab Take 1 g by mouth 2 times a day.     • Insulin Glargine (TOUJEO MAX SOLOSTAR) 300 UNIT/ML Solution Pen-injector Inject 28 Units as instructed every bedtime.     • insulin lispro, Human, (HUMALOG KWIKPEN) 100 UNIT/ML Solution Pen-injector injection Inject 5-9 Units as instructed 3 times a day before meals. Sliding Scale     • acetaminophen (TYLENOL) 500 MG Tab Take 1,000 mg by mouth every 6 hours as needed for Mild Pain or Moderate Pain.     • Cholecalciferol (VITAMIN D) 2000 units Cap Take  4,000 Units by mouth 2 Times a Day.          Allergies:   Allergies   Allergen Reactions   • Diphenhydramine Hcl Anxiety     Pt is able to tolerate  Mg benadryl with less anxiety   • Lorazepam Unspecified     Disorientation   • Ciprofloxacin      Rash,stomach ache   • Spironolactone      Acute kidney injury       Social Hx:   Social History     Socioeconomic History   • Marital status:      Spouse name: Not on file   • Number of children: Not on file   • Years of education: Not on file   • Highest education level: Not on file   Occupational History   • Not on file   Social Needs   • Financial resource strain: Not on file   • Food insecurity     Worry: Not on file     Inability: Not on file   • Transportation needs     Medical: Not on file     Non-medical: Not on file   Tobacco Use   • Smoking status: Never Smoker   • Smokeless tobacco: Never Used   • Tobacco comment: continued abstinence   Substance and Sexual Activity   • Alcohol use: Never   • Drug use: Never   • Sexual activity: Not Currently     Partners: Female     Comment: , one daughter, 2 grands   Lifestyle   • Physical activity     Days per week: Not on file     Minutes per session: Not on file   • Stress: Not on file   Relationships   • Social connections     Talks on phone: Not on file     Gets together: Not on file     Attends [employer] service: Not on file     Active member of club or organization: Not on file     Attends meetings of clubs or organizations: Not on file     Relationship status: Not on file   • Intimate partner violence     Fear of current or ex partner: Not on file     Emotionally abused: Not on file     Physically abused: Not on file     Forced sexual activity: Not on file   Other Topics Concern   •  Service Yes   • Blood Transfusions No   • Caffeine Concern No   • Occupational Exposure No   • Hobby Hazards No   • Sleep Concern Yes   • Stress Concern No   • Weight Concern No   • Special Diet No   • Back Care No   •  Exercise Yes   • Bike Helmet No   • Seat Belt Yes   • Self-Exams Yes   Social History Narrative    Av is from [place], [place] and raised in [place]. He has been in [place] since 2004. 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  He has been  to Usha since 2003, they met through a mutual friend. He has a daughter (Kalina) and a step son (Jimi).         EXAMINATION     Physical Exam:   Vitals: /80 (BP Location: Left arm, Patient Position: Sitting, BP Cuff Size: Adult)   Pulse 61   Temp 36.2 °C (97.2 °F) (Temporal)   Ht 1.829 m (6')   Wt 88.5 kg (195 lb)   SpO2 99%     Constitutional:   Body Habitus: Body mass index is 26.45 kg/m².  Cooperation: Fully cooperates with exam  Appearance: Well-groomed, well-nourished, not disheveled     Eyes: No scleral icterus to suggest severe liver disease, no proptosis to suggest severe hyperthyroid    ENT -no obvious auditory deficits, no obvious tongue lesions, tongue midline, no facial droop     Skin -no rashes or lesions noted     Respiratory-  breathing comfortable on room air, no audible wheezing    Cardiovascular- capillary refills less than 2 seconds. No lower extremity edema is noted.     Gastrointestinal - no obvious abdominal masses, No tenderness to palpation in the abdomen    Psychiatric- alert and oriented ×3. Normal affect.     Musculoskeletal and Neuro -         Thoracic/Lumbar Spine/Sacral Spine/Hips   Inspection: No evidence of atrophy in bilateral lower extremities throughout     ROM: decreased active range of motion with flexion, lateral flexion, and rotation bilaterally.   There is decreased active range of motion with lumbar extension with pain.    There is pain with facet loading maneuver (extension rotation) with  axial low back pain on the RIGHT side(s)    Palpation:   No tenderness to palpation in midline at T1-T12 levels. No tenderness to palpation in the left and right of the midline T1-L5, NEGATIVE for tenderness to palpation to the para-midline region in the lower lumbar levels.  palpation over SI joint: negative bilaterally    palpation in hip or over the gluteus medius tendon insertion: negative bilaterally      Lumbar spine Special tests  Neuro tension  Straight leg test positive right, negative left    Slump test positive right, negative left      There is pain on the right side and L3 and L4 distribution    Motor Exam Lower Extremities    ? Myotome R L   Hip flexion L2 5 3   Knee extension L3 5 3   Ankle dorsiflexion L4 5 3   Toe extension L5 5 3   Ankle plantarflexion S1 5 4-           MEDICAL DECISION MAKING    Medical records review: see under HPI section.     DATA    Labs:   Lab Results   Component Value Date/Time    SODIUM 138 08/17/2020 02:29 PM    POTASSIUM 3.7 08/17/2020 02:29 PM    CHLORIDE 103 08/17/2020 02:29 PM    CO2 22 08/17/2020 02:29 PM    ANION 13.0 08/17/2020 02:29 PM    GLUCOSE 91 08/17/2020 02:29 PM    BUN 31 (H) 08/17/2020 02:29 PM    CREATININE 1.44 (H) 08/17/2020 02:29 PM    CREATININE 1.4 05/28/2008 05:42 PM    CALCIUM 8.9 08/17/2020 02:29 PM    ASTSGOT 59 (H) 01/26/2020 04:39 PM    ALTSGPT 33 01/26/2020 04:39 PM    TBILIRUBIN 0.4 01/26/2020 04:39 PM    ALBUMIN 3.2 01/26/2020 04:39 PM    TOTPROTEIN 5.4 (L) 01/26/2020 04:39 PM    GLOBULIN 2.2 01/26/2020 04:39 PM    AGRATIO 1.5 01/26/2020 04:39 PM   ]    Lab Results   Component Value Date/Time    PROTHROMBTM 15.3 (H) 01/22/2020 12:35 PM    INR 1.19 (H) 01/22/2020 12:35 PM        Lab Results   Component Value Date/Time    WBC 9.5 08/17/2020 02:29 PM    RBC 4.19 (L) 08/17/2020 02:29 PM    HEMOGLOBIN 12.0 (L) 08/17/2020 02:29 PM    HEMATOCRIT 37.1 (L) 08/17/2020 02:29 PM    MCV 88.5 08/17/2020 02:29 PM    MCH 28.6 08/17/2020 02:29 PM    MCHC 32.3  (L) 08/17/2020 02:29 PM    MPV 10.2 08/17/2020 02:29 PM    NEUTSPOLYS 91.70 (H) 01/26/2020 04:39 PM    LYMPHOCYTES 1.60 (L) 01/26/2020 04:39 PM    MONOCYTES 4.30 01/26/2020 04:39 PM    EOSINOPHILS 1.10 01/26/2020 04:39 PM    BASOPHILS 0.20 01/26/2020 04:39 PM    HYPOCHROMIA 1+ 03/21/2012 01:08 PM        Lab Results   Component Value Date/Time    HBA1C 5.8 (H) 06/19/2020 08:14 AM        Imaging:   I personally reviewed following images, these are my reads  MRI lumbar spine 9/24/2020  There are disc bulges worst at L3-4 and L4-5 with degenerative changes and mild to moderate central canal stenosis at both these levels.  There is moderate bilateral neuroforaminal stenosis at L4-5 and mild to moderate bilateral neuroforaminal stenosis at L3-4.  There is facet arthropathy bilaterally worst at L3-4, L4-5, L5-S1.  At the left L4-5 facet there is likely a facet cyst on the anteromedial portion of the facet.    IMAGING radiology reads. I reviewed the following radiology reads             Results for orders placed during the hospital encounter of 11/20/18   MR-BRAIN-W/O    Impression 1.  Moderate cerebral atrophy.  2.  Old infarcts left head of caudate nucleus and left frontal corona radiata.  3.  Mild supratentorial white matter disease most consistent with microvascular ischemic change.  4.  Tiny curvilinear focus of encephalomalacia in the right anterior quadrant of the medulla most consistent with an old lacunar size brainstem infarct.  5.  Mild pontine ischemic gliosis.  6.  No evidence of acute infarction, acute hemorrhage, or mass lesion.                                 Results for orders placed during the hospital encounter of 09/24/20   MR-LUMBAR SPINE-W/O    Impression 1.  Multifocal degenerative disease in the lumbar spine as described above.  2.  Bilateral hydronephrosis and hydroureter. This is noted on the previous CT scan dated 1/21/2020.  3.  There is small focus of sclerosis in the L4 vertebral body. This is  new since the previous study. This finding likely secondary to the degeneration. However if there is a history of carcinoma the possibility of sclerotic metastasis cannot be   excluded.                Results for orders placed during the hospital encounter of 09/24/20   MR-LUMBAR SPINE-W/O    Impression 1.  Multifocal degenerative disease in the lumbar spine as described above.  2.  Bilateral hydronephrosis and hydroureter. This is noted on the previous CT scan dated 1/21/2020.  3.  There is small focus of sclerosis in the L4 vertebral body. This is new since the previous study. This finding likely secondary to the degeneration. However if there is a history of carcinoma the possibility of sclerotic metastasis cannot be   excluded.                                                                         Results for orders placed in visit on 07/23/20   DX-CHEST-2 VIEWS    Impression No acute cardiopulmonary disease.                                                                                   Diagnosis   Visit Diagnoses     ICD-10-CM   1. Lumbar radiculopathy  M54.16   2. Acute right-sided low back pain with right-sided sciatica  M54.41   3. Chronic right-sided low back pain with right-sided sciatica  M54.41    G89.29           ASSESSMENT AND PLAN:  Av Allen Lisbeth 75 y.o. male      Av was seen today for new patient.    Diagnoses and all orders for this visit:    Lumbar radiculopathy  -     REFERRAL TO PHYSICAL MEDICINE REHAB    Acute right-sided low back pain with right-sided sciatica    Chronic right-sided low back pain with right-sided sciatica      I advised the patient to keep seeing his PCP and following up with Dr. Huff.    I sent a message to Dr. Madison Bunch asking if the patient can be off of aspirin and Plavix for 5 days prior to the procedure below.  If he is not able to go off of these we can still proceed with the theoretical increased risk of bleeding but this would be minimized by  using the smallest needles possible and fluoroscopic guidance.    I believe the patient symptoms are mostly secondary to a lumbar radiculopathy likely mostly of the L3 and L4 nerves based on the pain distribution    He has been to physical therapy in the past and has a home exercise program.  He responded quite well to epidural steroid injections in the past.  He is unsure of the exact level where the wrist were done and is unsure of the exact physician who did these.    Diagnostic workup: Procedure below for diagnostic and therapeutic purposes    Medications: Reasonable for the patient to continue Tylenol up to 1000 mg 3 times daily.    Interventional program:   I have ordered a right L3-4 and L4-5 transforaminal epidural steroid injection    The risks benefits and alternatives to this procedure were discussed and the patient wishes to proceed with the procedure. Risks include but are not limited to damage to surrounding structures, infection, bleeding, worsening of pain which can be permanent, weakness which can be permanent. Benefits include pain relief, improved function. Alternatives includes not doing the procedure.            Follow-up: After the above diagnostic studies           Please note that this dictation was created using voice recognition software. I have made every reasonable attempt to correct obvious errors but there may be errors of grammar and content that I may have overlooked prior to finalization of this note.      Harpal Bennett MD  Physical Medicine and Rehabilitation  Interventional Spine and Sports Physiatry  [facility]           Lorie Rose D.O.   CALVIN Bunch D.O.

## 2020-10-15 NOTE — H&P (VIEW-ONLY)
New patient note (this is billed as a follow-up visit as the patient was previously seen by Dr. Lorie Huff)    Physiatry (physical medicine and  Rehabilitation), interventional spine and sports medicine    Date of service: See epic    Chief complaint:   Chief Complaint   Patient presents with   • New Patient     back pain        Referring provider: Lorie Huff D.O.     HISTORY    HPI: Av Bob 75 y.o.  who presents today with Diagnoses of Lumbar radiculopathy, Acute right-sided low back pain with right-sided sciatica, and Chronic right-sided low back pain with right-sided sciatica were pertinent to this visit.    HPI    Acute on chronic right low back pain radiating both the anterior and anterolateral and posterior lateral aspect of the right leg down towards the foot and ankle towards the medial malleolus.  He has had similar episodes in the past many years ago which responded quite well to epidural steroid injections.  This is been gradually worsening over the past several months with functional deficit and difficulty with walking in his ADLs.  His wife assist with lower body dressing.       Medical records review:  I reviewed the note from the referring provider Lorie Huff D.O. including the note dated 9/28/2020 with multiple issues addressed regarding the patient's stroke general debility and fatigue.  Neurogenic bladder was addressed.  MRI of the lumbar spine was previously ordered.  The patient was referred to Dr. Bennett for evaluation for consideration of interventional procedure.           ROS:   Red Flags ROS:   Fever, Chills, Sweats: Denies  Involuntary Weight Loss: Denies  Bladder Incontinence: Denies  Bowel Incontinence: denies  Saddle Anesthesia: Denies    All other systems reviewed and negative.       PMHx:   Past Medical History:   Diagnosis Date   • (HCC) Chronic ulcer of right lower extremity with fat layer exposed 12/27/2018   • Acute on chronic renal failure  ([facility]) 1/21/2020   • Acute respiratory failure with hypoxia ([facility]) 5/20/2017   • CAD (coronary artery disease)     GIOVANNA to RCA in '97, GIOVANNA X2 to LAD and GIOVANNA X2 to OM in '09   • Cancer ([facility])     2017; chemo lympoma   • Cataract    • Cerebrovascular accident (CVA) ([facility]) 12/30/2016    Left arm weakness  etiology of stroke not established, lymphoma discovered on MRI evaluation of stroke, L hemiparesis much worse after acute infectious illness in mid 2017, but no specific diagnosed recurrent neurological etiology, all at [facility]   • Chickenpox    • CKD (chronic kidney disease) stage 3, GFR 30-59 ml/min    • Controlled gout 2014   • Coronary atherosclerosis of native coronary artery     S/P PTCA (percutaneous transluminal coronary angioplasty), RCA, 5/1997, patent on cath 7/10/2009 at the time of interventions on his left anterior descending and circumflex coronary arteries   • Daytime sleepiness    • Depression    • Diabetes ([facility])    • Difficulty swallowing    • Enterococcal septicemia ([facility]) 8/12/2017   • Roberto hematuria 1/29/2020   • Frequent urination    • GERD (gastroesophageal reflux disease)    • Khmer measles    • Hypertension    • Hypokalemia 2012    controlled with combination of ACE inhibitor or ARB plus spironolactone   • Hypomagnesemia 08/12/2017    etiology uncertain   • Influenza A 1/26/2020   • Insomnia    • Kidney stone    • Leg edema, right 1/22/2020   • 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 Barry   • Normocytic hypochromic anemia 5/20/2017   • NSTEMI (non-ST elevated myocardial infarction) ([facility]) 07/18/2017    complicating UTI with sepsis   • Pain    • Peripheral vascular disease ([facility])    • Polyneuropathy in diabetes(357.2) 9/11/2013   • Renal mass 1/21/2020   • Septic shock ([facility]) 5/20/2017   • Skin ulcer of calf ([facility]) 2015    Right, Dr. Terry and wound care   • Stroke  ([facility]) 2016    left sided weakness   • Urinary bladder disorder    • Urinary incontinence    • Weakness    • Wound of left leg 2012    Requiring surgery and debridment, Dr. Moore         Current Outpatient Medications on File Prior to Visit   Medication Sig Dispense Refill   • atorvastatin (LIPITOR) 40 MG Tab Take 1 Tab by mouth every evening. 90 Tab 3   • triamcinolone acetonide (KENALOG) 0.1 % Cream      • allopurinol (ZYLOPRIM) 100 MG Tab TAKE 1 TABLET BY MOUTH EVERY DAY 90 Tab 0   • clopidogrel (PLAVIX) 75 MG Tab TAKE 1 TABLET BY MOUTH EVERY DAY 90 Tab 1   • fluoxetine (PROZAC) 40 MG capsule Take 1 Cap by mouth every day. 90 Cap 3   • losartan (COZAAR) 50 MG Tab Take 1 Tab by mouth 2 Times a Day. 180 Tab 3   • metoprolol SR (TOPROL XL) 100 MG TABLET SR 24 HR Take 1 Tab by mouth every day. 90 Tab 3   • amLODIPine (NORVASC) 5 MG Tab Take 1 Tab by mouth every day. 90 Tab 3   • Multiple Vitamin (MULTI VITAMIN MENS PO) Take  by mouth.     • potassium chloride (KLOR-CON) 8 MEQ tablet TAKE 1 TABLET BY MOUTH EVERY DAY 90 Tab 3   • aspirin EC (ECOTRIN) 81 MG Tablet Delayed Response Take 81 mg by mouth every day.     • Probiotic Product (PROBIOTIC DAILY PO) Take 1 Cap by mouth 2 Times a Day.     • magnesium oxide (MAG-OX) 400 MG Tab Take 400 mg by mouth every day.     • finasteride (PROSCAR) 5 MG Tab Take 1 Tab by mouth every day. 30 Tab 0   • fenofibrate (TRICOR) 145 MG Tab Take 145 mg by mouth every evening.     • alfuzosin (UROXATRAL) 10 MG SR tablet Take 10 mg by mouth every day.     • methenamine hip (HIPPREX) 1 GM Tab Take 1 g by mouth 2 times a day.     • Insulin Glargine (TOUJEO MAX SOLOSTAR) 300 UNIT/ML Solution Pen-injector Inject 28 Units as instructed every bedtime.     • insulin lispro, Human, (HUMALOG KWIKPEN) 100 UNIT/ML Solution Pen-injector injection Inject 5-9 Units as instructed 3 times a day before meals. Sliding Scale     • acetaminophen (TYLENOL) 500 MG Tab Take 1,000 mg by mouth every 6 hours as  needed for Mild Pain or Moderate Pain.     • Cholecalciferol (VITAMIN D) 2000 units Cap Take 4,000 Units by mouth 2 Times a Day.     • TOUJEO SOLOSTAR 300 UNIT/ML Solution Pen-injector      • Insulin Pen Needle 32 G x 4 mm (BD PEN NEEDLE SWATI U/F) USE FOR INSULIN SHOTS FIVE TIMES DAILY 500 Each 3   • TRULICITY 1.5 MG/0.5ML Solution Pen-injector INJECT CONTENTS OF 1 SYRINGE SUBCUTANEOUSLY EVERY 7 DAYS ON TUESDAY 6 mL 1   • ONE TOUCH ULTRA TEST strip USE TO TEST FIVE TIMES DAILY 600 Strip 1     No current facility-administered medications on file prior to visit.         PSHx:   Past Surgical History:   Procedure Laterality Date   • CYSTOSCOPY STENT PLACEMENT Left 2/12/2018    Procedure: CYSTOSCOPY  ;  Surgeon: Rey Barry M.D.;  Location: SURGERY [facility];  Service: Urology   • URETEROSCOPY Left 2/12/2018    Procedure: URETEROSCOPY- FLEXIBLE  ;  Surgeon: Rey Barry M.D.;  Location: SURGERY [facility];  Service: Urology   • LASERTRIPSY Left 2/12/2018    Procedure: LASERTRIPSY - LITHO  ;  Surgeon: Rey Barry M.D.;  Location: SURGERY [facility];  Service: Urology   • WOUND CLOSURE GENERAL  4/3/2012    Performed by GERHARD GILBERT at SURGERY SAME DAY [facility] ORS   • Z CARDIAC CATH  2009    Stents to LAD, Om   • DAGOBERTO BY LAPAROSCOPY  1998   • ANGIOPLASTY  1997    RCA followed by other stents as noted above.    • ZZZ CARDIAC CATH  1997    stent RCA   • CATARACT EXTRACTION WITH IOL      bilateral   • LITHOTRIPSY     • TONSILLECTOMY     • TONSILLECTOMY AND ADENOIDECTOMY         Family history   Family History   Problem Relation Age of Onset   • Heart Disease Father         CAD   • Diabetes Father    • Cancer Mother    • Psychiatric Illness Mother         Depression   • Depression Mother    • Kidney stones Brother    • Heart Disease Brother    • Psychiatric Illness Brother         Depression   • Depression Brother    • Suicide Attempts Other    • Psychiatric Illness Other         autism          Medications: reviewed on epic.   Outpatient Medications Marked as Taking for the 10/15/20 encounter (Office Visit) with Harpal Bennett M.D.   Medication Sig Dispense Refill   • atorvastatin (LIPITOR) 40 MG Tab Take 1 Tab by mouth every evening. 90 Tab 3   • triamcinolone acetonide (KENALOG) 0.1 % Cream      • allopurinol (ZYLOPRIM) 100 MG Tab TAKE 1 TABLET BY MOUTH EVERY DAY 90 Tab 0   • clopidogrel (PLAVIX) 75 MG Tab TAKE 1 TABLET BY MOUTH EVERY DAY 90 Tab 1   • fluoxetine (PROZAC) 40 MG capsule Take 1 Cap by mouth every day. 90 Cap 3   • losartan (COZAAR) 50 MG Tab Take 1 Tab by mouth 2 Times a Day. 180 Tab 3   • metoprolol SR (TOPROL XL) 100 MG TABLET SR 24 HR Take 1 Tab by mouth every day. 90 Tab 3   • amLODIPine (NORVASC) 5 MG Tab Take 1 Tab by mouth every day. 90 Tab 3   • Multiple Vitamin (MULTI VITAMIN MENS PO) Take  by mouth.     • potassium chloride (KLOR-CON) 8 MEQ tablet TAKE 1 TABLET BY MOUTH EVERY DAY 90 Tab 3   • aspirin EC (ECOTRIN) 81 MG Tablet Delayed Response Take 81 mg by mouth every day.     • Probiotic Product (PROBIOTIC DAILY PO) Take 1 Cap by mouth 2 Times a Day.     • magnesium oxide (MAG-OX) 400 MG Tab Take 400 mg by mouth every day.     • finasteride (PROSCAR) 5 MG Tab Take 1 Tab by mouth every day. 30 Tab 0   • fenofibrate (TRICOR) 145 MG Tab Take 145 mg by mouth every evening.     • alfuzosin (UROXATRAL) 10 MG SR tablet Take 10 mg by mouth every day.     • methenamine hip (HIPPREX) 1 GM Tab Take 1 g by mouth 2 times a day.     • Insulin Glargine (TOUJEO MAX SOLOSTAR) 300 UNIT/ML Solution Pen-injector Inject 28 Units as instructed every bedtime.     • insulin lispro, Human, (HUMALOG KWIKPEN) 100 UNIT/ML Solution Pen-injector injection Inject 5-9 Units as instructed 3 times a day before meals. Sliding Scale     • acetaminophen (TYLENOL) 500 MG Tab Take 1,000 mg by mouth every 6 hours as needed for Mild Pain or Moderate Pain.     • Cholecalciferol (VITAMIN D) 2000 units Cap Take  4,000 Units by mouth 2 Times a Day.          Allergies:   Allergies   Allergen Reactions   • Diphenhydramine Hcl Anxiety     Pt is able to tolerate  Mg benadryl with less anxiety   • Lorazepam Unspecified     Disorientation   • Ciprofloxacin      Rash,stomach ache   • Spironolactone      Acute kidney injury       Social Hx:   Social History     Socioeconomic History   • Marital status:      Spouse name: Not on file   • Number of children: Not on file   • Years of education: Not on file   • Highest education level: Not on file   Occupational History   • Not on file   Social Needs   • Financial resource strain: Not on file   • Food insecurity     Worry: Not on file     Inability: Not on file   • Transportation needs     Medical: Not on file     Non-medical: Not on file   Tobacco Use   • Smoking status: Never Smoker   • Smokeless tobacco: Never Used   • Tobacco comment: continued abstinence   Substance and Sexual Activity   • Alcohol use: Never   • Drug use: Never   • Sexual activity: Not Currently     Partners: Female     Comment: , one daughter, 2 grands   Lifestyle   • Physical activity     Days per week: Not on file     Minutes per session: Not on file   • Stress: Not on file   Relationships   • Social connections     Talks on phone: Not on file     Gets together: Not on file     Attends [employer] service: Not on file     Active member of club or organization: Not on file     Attends meetings of clubs or organizations: Not on file     Relationship status: Not on file   • Intimate partner violence     Fear of current or ex partner: Not on file     Emotionally abused: Not on file     Physically abused: Not on file     Forced sexual activity: Not on file   Other Topics Concern   •  Service Yes   • Blood Transfusions No   • Caffeine Concern No   • Occupational Exposure No   • Hobby Hazards No   • Sleep Concern Yes   • Stress Concern No   • Weight Concern No   • Special Diet No   • Back Care No   •  Exercise Yes   • Bike Helmet No   • Seat Belt Yes   • Self-Exams Yes   Social History Narrative    Av is from [place], [place] and raised in [place]. He has been in [place] since 2004. 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  He has been  to Usha since 2003, they met through a mutual friend. He has a daughter (Kalina) and a step son (Jimi).         EXAMINATION     Physical Exam:   Vitals: /80 (BP Location: Left arm, Patient Position: Sitting, BP Cuff Size: Adult)   Pulse 61   Temp 36.2 °C (97.2 °F) (Temporal)   Ht 1.829 m (6')   Wt 88.5 kg (195 lb)   SpO2 99%     Constitutional:   Body Habitus: Body mass index is 26.45 kg/m².  Cooperation: Fully cooperates with exam  Appearance: Well-groomed, well-nourished, not disheveled     Eyes: No scleral icterus to suggest severe liver disease, no proptosis to suggest severe hyperthyroid    ENT -no obvious auditory deficits, no obvious tongue lesions, tongue midline, no facial droop     Skin -no rashes or lesions noted     Respiratory-  breathing comfortable on room air, no audible wheezing    Cardiovascular- capillary refills less than 2 seconds. No lower extremity edema is noted.     Gastrointestinal - no obvious abdominal masses, No tenderness to palpation in the abdomen    Psychiatric- alert and oriented ×3. Normal affect.     Musculoskeletal and Neuro -         Thoracic/Lumbar Spine/Sacral Spine/Hips   Inspection: No evidence of atrophy in bilateral lower extremities throughout     ROM: decreased active range of motion with flexion, lateral flexion, and rotation bilaterally.   There is decreased active range of motion with lumbar extension with pain.    There is pain with facet loading maneuver (extension rotation) with  axial low back pain on the RIGHT side(s)    Palpation:   No tenderness to palpation in midline at T1-T12 levels. No tenderness to palpation in the left and right of the midline T1-L5, NEGATIVE for tenderness to palpation to the para-midline region in the lower lumbar levels.  palpation over SI joint: negative bilaterally    palpation in hip or over the gluteus medius tendon insertion: negative bilaterally      Lumbar spine Special tests  Neuro tension  Straight leg test positive right, negative left    Slump test positive right, negative left      There is pain on the right side and L3 and L4 distribution    Motor Exam Lower Extremities    ? Myotome R L   Hip flexion L2 5 3   Knee extension L3 5 3   Ankle dorsiflexion L4 5 3   Toe extension L5 5 3   Ankle plantarflexion S1 5 4-           MEDICAL DECISION MAKING    Medical records review: see under HPI section.     DATA    Labs:   Lab Results   Component Value Date/Time    SODIUM 138 08/17/2020 02:29 PM    POTASSIUM 3.7 08/17/2020 02:29 PM    CHLORIDE 103 08/17/2020 02:29 PM    CO2 22 08/17/2020 02:29 PM    ANION 13.0 08/17/2020 02:29 PM    GLUCOSE 91 08/17/2020 02:29 PM    BUN 31 (H) 08/17/2020 02:29 PM    CREATININE 1.44 (H) 08/17/2020 02:29 PM    CREATININE 1.4 05/28/2008 05:42 PM    CALCIUM 8.9 08/17/2020 02:29 PM    ASTSGOT 59 (H) 01/26/2020 04:39 PM    ALTSGPT 33 01/26/2020 04:39 PM    TBILIRUBIN 0.4 01/26/2020 04:39 PM    ALBUMIN 3.2 01/26/2020 04:39 PM    TOTPROTEIN 5.4 (L) 01/26/2020 04:39 PM    GLOBULIN 2.2 01/26/2020 04:39 PM    AGRATIO 1.5 01/26/2020 04:39 PM   ]    Lab Results   Component Value Date/Time    PROTHROMBTM 15.3 (H) 01/22/2020 12:35 PM    INR 1.19 (H) 01/22/2020 12:35 PM        Lab Results   Component Value Date/Time    WBC 9.5 08/17/2020 02:29 PM    RBC 4.19 (L) 08/17/2020 02:29 PM    HEMOGLOBIN 12.0 (L) 08/17/2020 02:29 PM    HEMATOCRIT 37.1 (L) 08/17/2020 02:29 PM    MCV 88.5 08/17/2020 02:29 PM    MCH 28.6 08/17/2020 02:29 PM    MCHC 32.3  (L) 08/17/2020 02:29 PM    MPV 10.2 08/17/2020 02:29 PM    NEUTSPOLYS 91.70 (H) 01/26/2020 04:39 PM    LYMPHOCYTES 1.60 (L) 01/26/2020 04:39 PM    MONOCYTES 4.30 01/26/2020 04:39 PM    EOSINOPHILS 1.10 01/26/2020 04:39 PM    BASOPHILS 0.20 01/26/2020 04:39 PM    HYPOCHROMIA 1+ 03/21/2012 01:08 PM        Lab Results   Component Value Date/Time    HBA1C 5.8 (H) 06/19/2020 08:14 AM        Imaging:   I personally reviewed following images, these are my reads  MRI lumbar spine 9/24/2020  There are disc bulges worst at L3-4 and L4-5 with degenerative changes and mild to moderate central canal stenosis at both these levels.  There is moderate bilateral neuroforaminal stenosis at L4-5 and mild to moderate bilateral neuroforaminal stenosis at L3-4.  There is facet arthropathy bilaterally worst at L3-4, L4-5, L5-S1.  At the left L4-5 facet there is likely a facet cyst on the anteromedial portion of the facet.    IMAGING radiology reads. I reviewed the following radiology reads             Results for orders placed during the hospital encounter of 11/20/18   MR-BRAIN-W/O    Impression 1.  Moderate cerebral atrophy.  2.  Old infarcts left head of caudate nucleus and left frontal corona radiata.  3.  Mild supratentorial white matter disease most consistent with microvascular ischemic change.  4.  Tiny curvilinear focus of encephalomalacia in the right anterior quadrant of the medulla most consistent with an old lacunar size brainstem infarct.  5.  Mild pontine ischemic gliosis.  6.  No evidence of acute infarction, acute hemorrhage, or mass lesion.                                 Results for orders placed during the hospital encounter of 09/24/20   MR-LUMBAR SPINE-W/O    Impression 1.  Multifocal degenerative disease in the lumbar spine as described above.  2.  Bilateral hydronephrosis and hydroureter. This is noted on the previous CT scan dated 1/21/2020.  3.  There is small focus of sclerosis in the L4 vertebral body. This is  new since the previous study. This finding likely secondary to the degeneration. However if there is a history of carcinoma the possibility of sclerotic metastasis cannot be   excluded.                Results for orders placed during the hospital encounter of 09/24/20   MR-LUMBAR SPINE-W/O    Impression 1.  Multifocal degenerative disease in the lumbar spine as described above.  2.  Bilateral hydronephrosis and hydroureter. This is noted on the previous CT scan dated 1/21/2020.  3.  There is small focus of sclerosis in the L4 vertebral body. This is new since the previous study. This finding likely secondary to the degeneration. However if there is a history of carcinoma the possibility of sclerotic metastasis cannot be   excluded.                                                                         Results for orders placed in visit on 07/23/20   DX-CHEST-2 VIEWS    Impression No acute cardiopulmonary disease.                                                                                   Diagnosis   Visit Diagnoses     ICD-10-CM   1. Lumbar radiculopathy  M54.16   2. Acute right-sided low back pain with right-sided sciatica  M54.41   3. Chronic right-sided low back pain with right-sided sciatica  M54.41    G89.29           ASSESSMENT AND PLAN:  Av Allen Lisbeth 75 y.o. male      Av was seen today for new patient.    Diagnoses and all orders for this visit:    Lumbar radiculopathy  -     REFERRAL TO PHYSICAL MEDICINE REHAB    Acute right-sided low back pain with right-sided sciatica    Chronic right-sided low back pain with right-sided sciatica      I advised the patient to keep seeing his PCP and following up with Dr. Huff.    I sent a message to Dr. Madison Bunch asking if the patient can be off of aspirin and Plavix for 5 days prior to the procedure below.  If he is not able to go off of these we can still proceed with the theoretical increased risk of bleeding but this would be minimized by  using the smallest needles possible and fluoroscopic guidance.    I believe the patient symptoms are mostly secondary to a lumbar radiculopathy likely mostly of the L3 and L4 nerves based on the pain distribution    He has been to physical therapy in the past and has a home exercise program.  He responded quite well to epidural steroid injections in the past.  He is unsure of the exact level where the wrist were done and is unsure of the exact physician who did these.    Diagnostic workup: Procedure below for diagnostic and therapeutic purposes    Medications: Reasonable for the patient to continue Tylenol up to 1000 mg 3 times daily.    Interventional program:   I have ordered a right L3-4 and L4-5 transforaminal epidural steroid injection    The risks benefits and alternatives to this procedure were discussed and the patient wishes to proceed with the procedure. Risks include but are not limited to damage to surrounding structures, infection, bleeding, worsening of pain which can be permanent, weakness which can be permanent. Benefits include pain relief, improved function. Alternatives includes not doing the procedure.            Follow-up: After the above diagnostic studies           Please note that this dictation was created using voice recognition software. I have made every reasonable attempt to correct obvious errors but there may be errors of grammar and content that I may have overlooked prior to finalization of this note.      Harpal Bennett MD  Physical Medicine and Rehabilitation  Interventional Spine and Sports Physiatry  [facility]           Lorie Rose D.O.   CALVIN Bunch D.O.

## 2020-10-16 ENCOUNTER — HOSPITAL ENCOUNTER (OUTPATIENT)
Facility: REHABILITATION | Age: 75
End: 2020-10-16
Attending: PHYSICAL MEDICINE & REHABILITATION | Admitting: PHYSICAL MEDICINE & REHABILITATION
Payer: MEDICARE

## 2020-10-20 ENCOUNTER — TELEPHONE (OUTPATIENT)
Dept: PHYSICAL MEDICINE AND REHAB | Facility: MEDICAL CENTER | Age: 75
End: 2020-10-20

## 2020-10-20 NOTE — Clinical Note
I discussed the case with the patient's PCP Dr. Madison Bunch.  Please have the patient stop aspirin and Plavix 5 days prior to the procedure and okay to resume the day after.  Dr. Bennett

## 2020-10-20 NOTE — PROGRESS NOTES
I discussed the case with the patient's PCP Dr. Madison Bunch D.O. who stated that the patient is on his antiplatelet medications for secondary prophylaxis.  She stated the risk is minimal for stroke for him to be off of this for a short period of time.  We will plan to have the patient off of aspirin and Plavix for 5 days prior to the spine injection and okay for the patient to resume after.    Harpal Bennett MD

## 2020-10-20 NOTE — TELEPHONE ENCOUNTER
----- Message from Vinod Coughlin sent at 10/20/2020 10:06 AM PDT -----    ----- Message -----  From: Harpal Bennett M.D.  Sent: 10/20/2020  10:01 AM PDT  To: Vinod Coughlin    I discussed the case with the patient's PCP Dr. Madison Bunch.  Please have the patient stop aspirin and Plavix 5 days prior to the procedure and okay to resume the day after.Dr. Bennett

## 2020-10-29 NOTE — PREPROCEDURE INSTRUCTIONS
PAT call returned by pt 10/29/2020 at approx 1440, spoke with pt after confirming 2 pt identifiers. Pt denies being sick/symptomatic (fever, cough, SOB, diarrhea) w/in last 72 hrs, or having close contact w/ sick individuals in the past 2 wks. Pt education to notify his MD should he become symptomatic prior to procedure. Pt then handed phone to his wife Usha to handle the rest of the phone call. Health hx, medications, allergies  reviewed with her, as well as pre-procedure/sedation instructions. She verbalizes understanding. Informed wife that pt needs to bring a form fitting mask and the he will be wearing it entire stay. Informed it is recommended pt self isolate for 72 hrs or from time of this call until procedure, also that we request the  to remain on site until pt is discharged. Pt verbalizes understanding.

## 2020-10-31 DIAGNOSIS — E78.5 DYSLIPIDEMIA: ICD-10-CM

## 2020-10-31 RX ORDER — FENOFIBRATE 145 MG/1
TABLET, COATED ORAL
Qty: 90 TAB | Status: CANCELLED | OUTPATIENT
Start: 2020-10-31

## 2020-11-02 ENCOUNTER — HOSPITAL ENCOUNTER (OUTPATIENT)
Facility: REHABILITATION | Age: 75
End: 2020-11-02
Attending: PHYSICAL MEDICINE & REHABILITATION | Admitting: PHYSICAL MEDICINE & REHABILITATION
Payer: MEDICARE

## 2020-11-02 ENCOUNTER — APPOINTMENT (OUTPATIENT)
Dept: RADIOLOGY | Facility: REHABILITATION | Age: 75
End: 2020-11-02
Attending: PHYSICAL MEDICINE & REHABILITATION
Payer: MEDICARE

## 2020-11-02 VITALS
OXYGEN SATURATION: 97 % | SYSTOLIC BLOOD PRESSURE: 138 MMHG | WEIGHT: 195.55 LBS | TEMPERATURE: 97.8 F | RESPIRATION RATE: 16 BRPM | HEART RATE: 65 BPM | DIASTOLIC BLOOD PRESSURE: 77 MMHG | BODY MASS INDEX: 26.49 KG/M2 | HEIGHT: 72 IN

## 2020-11-02 PROCEDURE — 64484 NJX AA&/STRD TFRM EPI L/S EA: CPT

## 2020-11-02 PROCEDURE — 700111 HCHG RX REV CODE 636 W/ 250 OVERRIDE (IP)

## 2020-11-02 PROCEDURE — A9585 GADOBUTROL INJECTION: HCPCS

## 2020-11-02 PROCEDURE — 64483 NJX AA&/STRD TFRM EPI L/S 1: CPT

## 2020-11-02 PROCEDURE — 700117 HCHG RX CONTRAST REV CODE 255

## 2020-11-02 RX ORDER — DEXAMETHASONE SODIUM PHOSPHATE 10 MG/ML
INJECTION, SOLUTION INTRAMUSCULAR; INTRAVENOUS
Status: COMPLETED
Start: 2020-11-02 | End: 2020-11-02

## 2020-11-02 RX ORDER — GADOBUTROL 604.72 MG/ML
INJECTION INTRAVENOUS
Status: COMPLETED
Start: 2020-11-02 | End: 2020-11-02

## 2020-11-02 RX ORDER — LIDOCAINE HYDROCHLORIDE 10 MG/ML
INJECTION, SOLUTION EPIDURAL; INFILTRATION; INTRACAUDAL; PERINEURAL
Status: COMPLETED
Start: 2020-11-02 | End: 2020-11-02

## 2020-11-02 RX ADMIN — DEXAMETHASONE SODIUM PHOSPHATE 10 MG: 10 INJECTION, SOLUTION INTRAMUSCULAR; INTRAVENOUS at 09:40

## 2020-11-02 RX ADMIN — LIDOCAINE HYDROCHLORIDE 10 ML: 10 INJECTION, SOLUTION EPIDURAL; INFILTRATION; INTRACAUDAL; PERINEURAL at 09:35

## 2020-11-02 RX ADMIN — GADOBUTROL 2 ML: 604.72 INJECTION INTRAVENOUS at 09:40

## 2020-11-02 ASSESSMENT — PAIN DESCRIPTION - PAIN TYPE: TYPE: CHRONIC PAIN

## 2020-11-02 ASSESSMENT — FIBROSIS 4 INDEX: FIB4 SCORE: 2.73

## 2020-11-02 NOTE — NON-PROVIDER
Handoff received from pre-procedure RN. Pre assessment complete with pt input, time out completed, including 2 pt identifiers, procedure and site of procedure, allergies, hx sleep apnea, pt history including decreased renal function, MI, CVA with LUE flaccid and LLE weak. Pt positioned prone by CST, RN, XRT with max assist from w/c to procedure table, ankles resting on pillow, right hand resting on head of procedure table, left arm dangling.     Pt transferred from procedure table to w/c post procedure x 3 assist and wheeled to post-op, received by post op RN.

## 2020-11-02 NOTE — NON-PROVIDER
Fluids  and food tolerated well. Ice compress applied to the affected area. Dr. Bennett evaluated patient.  Reviewed  home care instructions  and  He verbalized understanding. Discharged  without difficulty with designated  via wheelchair.

## 2020-11-02 NOTE — INTERVAL H&P NOTE
H&P reviewed. The patient was examined and there are no changes to the H&P     Lungs clear to auscultation bilaterally.  No abdominal tenderness.  Heart regular rate and rhythm.  Vitals reviewed.    Proceed with the originally scheduled procedure

## 2020-11-02 NOTE — OP REPORT
Date of Service: 11/2/2020     Patient: Av Bob 75 y.o. male     MRN: [id number]     Physician/s: Harpal Bennett MD    Pre-operative Diagnosis: Lumbar radiculopathy    Post-operative Diagnosis: Lumbar radiculopathy    Procedure: right Lumbar Transforaminal Epidural Steroid  at the L3-4 and L4-5 levels.     Description of procedure:    The risks, benefits, and alternatives of the procedure were reviewed and discussed with the patient.  Written informed consent was freely obtained. A pre-procedural time-out was conducted by the physician verifying patient’s identity, procedure to be performed, procedure site and side, and allergy verification. Appropriate equipment was determined to be in place for the procedure.         The patient's vital signs were carefully monitored before, throughout, and after the procedure.     In the fluoroscopy suite the patient was placed in a prone position, a pillow placed underneath their umbilicus. The skin was prepped and draped in the usual sterile fashion.     The fluoroscope was placed over the lumbar spine and adjusted into the proper AP/Oblique view to enter the transforaminal space just adjacent to the pedicle at the levels below. The targets for injection were then marked at the right L3-4. A 27g 1.5 inch needle was placed into the marked site, and 1mL of 1% Lidocaine was injected subcutaneously into the epidermal and dermal layers. The needle was removed intact.  A 25g 3.5 inch spinal needle was then placed and advanced under fluoroscopic guidance in an oblique view towards the epidural space of the levels noted above. The needle position was confirmed to not be past the 6 o'clock position in the AP view and it was in the neuroforamen in the lateral view.        The fluoroscope was was then adjusted over the lumbar spine and adjusted into the proper AP/Oblique view to enter the transforaminal space adjacent to the pedicle at the RIGHT  L4-5. A 27g 1.5 inch needle  was placed into the marked site, and 1mL of 1% Lidocaine was injected subcutaneously into the epidermal and dermal layers. The needle was removed intact.  A 25g 3.5 inch spinal needle was then placed and advanced under fluoroscopic guidance in an oblique view towards the epidural space of the levels noted above. The needle position was confirmed to not be past the 6 o'clock position in the AP view and it was in the neuroforamen in the lateral view.       Under live fluoroscopic guidance in the AP view, contrast dye was used to highlight the epidural space spread of each level above. Final fluoroscopic images were saved.  Following negative aspiration, 1mL of 1% lidocaine preservative free with 5 mg of dexamethasone was then injected at each level above, and the needles were removed intact after restyleted. The patient's back was covered with a 4x4 gauze, the area was cleansed with sterile normal saline, and a dressing was applied. There were no complications noted.     The patient was then evaluated post-procedure, and was hemodynamically stable prior to leaving the post-operative care unit.     Follow-up as scheduled    Harpal Bennett MD  Physical Medicine and Rehabilitation  Interventional Spine and Sports Physiatry  [facility]          CPT codes  Transforaminal epidural injection- lumbar or sacral (first level):  74308  Transforaminal epidural injection- lumbar or sacral (each additional level):  50152

## 2020-11-02 NOTE — NON-PROVIDER
Verified patient name.Confirmed procedure, site and  consent signed. H&P updated. Reviewed medication allergies and current medication in [facility]. Printed home care discharged,  pre and post including infection  prevention verbal instruction given to patient and verbalized understanding. Has  confirmed   / Usha waiting.Pertinent medical health  information reviewed in epic  ( type 2 DM, ASHLEY, HTN, left eric-paresis, CVA, ). Fib., PVD ) .Off Plavix and Aspirin for 5. days / weeks.  Blood sugar taken this morning [id number] mg. / dl. Patient denied taking any anti-inflammatory medication.

## 2020-11-03 ENCOUNTER — TELEPHONE (OUTPATIENT)
Dept: PHYSICAL MEDICINE AND REHAB | Facility: MEDICAL CENTER | Age: 75
End: 2020-11-03

## 2020-11-03 RX ORDER — FENOFIBRATE 145 MG/1
TABLET, COATED ORAL
Qty: 90 TAB | Refills: 3 | Status: SHIPPED | OUTPATIENT
Start: 2020-11-03 | End: 2021-05-19

## 2020-11-03 NOTE — TELEPHONE ENCOUNTER
KIMM to call us back in regards to his SP that was done with Dr. Bennett dated 11/2/2020 for his right L3-4 and L4-5 transforaminal epidural steroid injection with fluoroscopic guidance.    Thank you  Mariluz

## 2020-11-09 ENCOUNTER — OFFICE VISIT (OUTPATIENT)
Dept: SLEEP MEDICINE | Facility: MEDICAL CENTER | Age: 75
End: 2020-11-09
Payer: MEDICARE

## 2020-11-09 VITALS
OXYGEN SATURATION: 98 % | HEIGHT: 72 IN | HEART RATE: 58 BPM | BODY MASS INDEX: 26.41 KG/M2 | WEIGHT: 195 LBS | DIASTOLIC BLOOD PRESSURE: 74 MMHG | SYSTOLIC BLOOD PRESSURE: 126 MMHG

## 2020-11-09 DIAGNOSIS — I48.0 PAROXYSMAL ATRIAL FIBRILLATION (HCC): ICD-10-CM

## 2020-11-09 DIAGNOSIS — G47.33 OSA (OBSTRUCTIVE SLEEP APNEA): ICD-10-CM

## 2020-11-09 DIAGNOSIS — F51.04 PSYCHOPHYSIOLOGICAL INSOMNIA: ICD-10-CM

## 2020-11-09 DIAGNOSIS — I10 ESSENTIAL HYPERTENSION, BENIGN: ICD-10-CM

## 2020-11-09 PROCEDURE — 99213 OFFICE O/P EST LOW 20 MIN: CPT | Performed by: NURSE PRACTITIONER

## 2020-11-09 RX ORDER — ZOLPIDEM TARTRATE 5 MG/1
5 TABLET ORAL NIGHTLY PRN
Qty: 2 TAB | Refills: 0 | Status: SHIPPED | OUTPATIENT
Start: 2020-11-09 | End: 2020-11-10

## 2020-11-09 ASSESSMENT — FIBROSIS 4 INDEX: FIB4 SCORE: 2.73

## 2020-11-09 NOTE — PROGRESS NOTES
"Chief Complaint   Patient presents with   • Follow-Up     ASHLEY-Last seen 08/27/2020   • Results     Sleep Study 10/10/2020       HPI:      Mr. Bob is a 76 y/o male patient who is in today for AHSLEY f/u. PMH includes DM II, CAD, CVA in 2017 left hemiparesis, Afib, gout nephrosis, PVD, CKD stage III, non-Hodgkin's lymphoma in 2017, Norovirus with sepsis 5 days in ICU, psycho physiological insomnia, BPH, chronic fatigue, chronic gout, GERD, nocturnal hypoxemia, neurogenic bladder, frequent UTIs, wheelchair dependent, never smoker.    OPO in June 2020 showed that saturations were less than or equal to 88% for 132.4 minutes with a bartolo saturation of 59%.  The oximetric pattern was sawtoothed and episodic consistent with ASHLEY.    PSG from 10/10/20 indicated severe OAS with AHI of 81/hr and O2 bartolo 68%. Sleep related hypoxia. PVC. The apnea index was 50.51 per hour and the hypopnea index was 30.45 per hour resulting in an overall AHI of 80.96. AHI during rem was 0.0 and AHI while supine was 79.76. 10% of the apneas were central apneas. There was a mean oxygen saturation of 93.0% with a minimum oxygen saturation of 68.0%. Time spent with oxygen saturations below 89% was 80.9 minutes.    Patient is accompanied by his wife.  He describes his sleep problem as trouble falling asleep at night and staying asleep.  He is often awake most of the night and will sleep a lot during the day.  This results in him \"always feeling tired and fatigued,and  lacks energy.  Probably became worse after stroke, and cancer and septic shock in 2017.\" He leaves his TV on at nighttime which he feels allows him to stay asleep.  He is incontinent and wears disposable briefs.  He generally sleeps better in the early morning.  Wife reports that he will often have breakfast and then sleep up to 3 hours. He may fall asleep accidentally sitting at the table.  He will also fall asleep for 3 to 4 hours after eating dinner around 7 PM.  He is a retired "  and has always worked varying shift hours.  He follows up with Dr. Tucker, cardiology, for Afib and blood pressure management, he checks his blood pressure at home and also takes his blood pressure medication as directed.  He had trouble staying asleep during the sleep study however he did take one Ambien to help him sleep.  He had pain in his leg during the sleep study which has improved after a recent steroid injection.  He is open to repeating the sleep study as he is open to pursuing therapy to manage his apnea.  Pain in his back, legs and foot is often one of the reasons that he also has trouble sleeping.      ROS:    Constitutional: Denies fevers, Denies weight changes  Eyes: Denies changes in vision, no eye pain  Ears/Nose/Throat/Mouth: Denies nasal congestion or sore throat   Cardiovascular: Denies chest pain or palpitations   Respiratory: Denies shortness of breath , Denies cough  Gastrointestinal/Hepatic: Denies abdominal pain, nausea, vomiting,   Skin/Breast: Denies rash,   Neurological: Denies headache, confusion,   Psychiatric: denies mood disorder   Sleep: denies snoring       Past Medical History:   Diagnosis Date   • ([facility]) Chronic ulcer of right lower extremity with fat layer exposed 12/27/2018   • Acute on chronic renal failure ([facility]) 1/21/2020   • Acute respiratory failure with hypoxia ([facility]) 5/20/2017   • CAD (coronary artery disease)     GIOVANNA to RCA in '97, GIOVANNA X2 to LAD and GIOVANNA X2 to OM in '09   • Cancer ([facility])     2017; chemo lympoma   • Cataract    • Cerebrovascular accident (CVA) ([facility]) 12/30/2016    Left arm weakness  etiology of stroke not established, lymphoma discovered on MRI evaluation of stroke, L hemiparesis much worse after acute infectious illness in mid 2017, but no specific diagnosed recurrent neurological etiology, all at [facility]   • Chickenpox    • CKD (chronic kidney disease) stage 3, GFR 30-59 ml/min    • Controlled gout 2014   •  Coronary atherosclerosis of native coronary artery     S/P PTCA (percutaneous transluminal coronary angioplasty), RCA, 5/1997, patent on cath 7/10/2009 at the time of interventions on his left anterior descending and circumflex coronary arteries   • Daytime sleepiness    • Depression    • Diabetes ([facility])    • Difficulty swallowing    • Enterococcal septicemia ([facility]) 8/12/2017   • Roberto hematuria 1/29/2020   • Frequent urination    • GERD (gastroesophageal reflux disease)    • Brazilian measles    • Hypertension    • Hypokalemia 2012    controlled with combination of ACE inhibitor or ARB plus spironolactone   • Hypomagnesemia 08/12/2017    etiology uncertain   • Influenza A 1/26/2020   • Insomnia    • Kidney stone    • Leg edema, right 1/22/2020   • Lymphoma ([facility]) 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 Barry   • Normocytic hypochromic anemia 5/20/2017   • NSTEMI (non-ST elevated myocardial infarction) ([facility]) 07/18/2017    complicating UTI with sepsis   • Pain    • Peripheral vascular disease ([facility])    • Polyneuropathy in diabetes(357.2) 9/11/2013   • Renal mass 1/21/2020   • Septic shock ([facility]) 5/20/2017   • Skin ulcer of calf ([facility]) 2015    Right, Dr. Terry and wound care   • Stroke ([facility]) 2016    left sided weakness   • Urinary bladder disorder    • Urinary incontinence    • Weakness    • Wound of left leg 2012    Requiring surgery and debridment, Dr. Moore       Past Surgical History:   Procedure Laterality Date   • LUMBAR TRANSFORAMINAL EPIDURAL STEROID INJECTION Right 11/2/2020    Procedure: INJECTION, STEROID, SPINE, LUMBAR, EPIDURAL, TRANSFORAMINAL APPROACH;  Surgeon: Harpal Bennett M.D.;  [facility];  Service: Pain Management   • CYSTOSCOPY STENT PLACEMENT Left 2/12/2018    Procedure: CYSTOSCOPY  ;  Surgeon: Rey Barry M.D.;  Location: SURGERY [facility];  Service: Urology   • URETEROSCOPY Left  2/12/2018    Procedure: URETEROSCOPY- FLEXIBLE  ;  Surgeon: Rey Barry M.D.;  Location: SURGERY [facility];  Service: Urology   • LASERTRIPSY Left 2/12/2018    Procedure: LASERTRIPSY - LITHO  ;  Surgeon: Rey Barry M.D.;  Location: SURGERY [facility];  Service: Urology   • WOUND CLOSURE GENERAL  4/3/2012    Performed by GERHARD GILBERT at SURGERY SAME DAY [facility]   • ZZZ CARDIAC CATH  2009    Stents to LAD, Om   • DAGOBERTO BY LAPAROSCOPY  1998   • ANGIOPLASTY  1997    RCA followed by other stents as noted above.    • ZZZ CARDIAC CATH  1997    stent RCA   • CATARACT EXTRACTION WITH IOL      bilateral   • LITHOTRIPSY     • TONSILLECTOMY     • TONSILLECTOMY AND ADENOIDECTOMY         Family History   Problem Relation Age of Onset   • Heart Disease Father         CAD   • Diabetes Father    • Cancer Mother    • Psychiatric Illness Mother         Depression   • Depression Mother    • Kidney stones Brother    • Heart Disease Brother    • Psychiatric Illness Brother         Depression   • Depression Brother    • Suicide Attempts Other    • Psychiatric Illness Other         autism       Social History     Socioeconomic History   • Marital status:      Spouse name: Not on file   • Number of children: Not on file   • Years of education: Not on file   • Highest education level: Not on file   Occupational History   • Not on file   Social Needs   • Financial resource strain: Not on file   • Food insecurity     Worry: Not on file     Inability: Not on file   • Transportation needs     Medical: Not on file     Non-medical: Not on file   Tobacco Use   • Smoking status: Never Smoker   • Smokeless tobacco: Never Used   • Tobacco comment: continued abstinence   Substance and Sexual Activity   • Alcohol use: Never   • Drug use: Never   • Sexual activity: Not Currently     Partners: Female     Comment: , one daughter, 2 grands   Lifestyle   • Physical activity     Days per week: Not on file     Minutes  per session: Not on file   • Stress: Not on file   Relationships   • Social connections     Talks on phone: Not on file     Gets together: Not on file     Attends [employer] service: Not on file     Active member of club or organization: Not on file     Attends meetings of clubs or organizations: Not on file     Relationship status: Not on file   • Intimate partner violence     Fear of current or ex partner: Not on file     Emotionally abused: Not on file     Physically abused: Not on file     Forced sexual activity: Not on file   Other Topics Concern   •  Service Yes   • Blood Transfusions No   • Caffeine Concern No   • Occupational Exposure No   • Hobby Hazards No   • Sleep Concern Yes   • Stress Concern No   • Weight Concern No   • Special Diet No   • Back Care No   • Exercise Yes   • Bike Helmet No   • Seat Belt Yes   • Self-Exams Yes   Social History Narrative    Av is from [place], [place] and raised in [place]. He has been in [place] since 2004. 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  He has been  to Usha since 2003, they met through a mutual friend. He has a daughter (Kalina) and a step son (Jimi).       Allergies as of 11/09/2020 - Reviewed 11/09/2020   Allergen Reaction Noted   • Diphenhydramine hcl Anxiety 05/08/2017   • Lorazepam Unspecified 03/22/2017   • Ciprofloxacin  10/11/2017   • Spironolactone  10/08/2019        Vitals:  Vitals:    11/09/20 1410   BP: 126/74   Pulse: (!) 58   SpO2: 98%       Current medications as of today   Current Outpatient Medications   Medication Sig Dispense Refill   • zolpidem (AMBIEN) 5 MG Tab Take 1 Tab by mouth at bedtime as needed for Sleep (take on the night of sleep study, may repeat once at bedtime.) for up to 1 day.  2 Tab 0   • fenofibrate (TRICOR) 145 MG Tab TAKE 1 TABLET BY MOUTH EVERY DAY 90 Tab 3   • insulin lispro (HUMALOG) 100 UNIT/ML Solution Pen-injector injection PEN INJECT 20 UNITS UNDER THE SKIN AS INSTRUCTED DAILY 96 mL 2   • atorvastatin (LIPITOR) 40 MG Tab Take 1 Tab by mouth every evening. 90 Tab 3   • TOUJEO SOLOSTAR 300 UNIT/ML Solution Pen-injector      • triamcinolone acetonide (KENALOG) 0.1 % Cream      • allopurinol (ZYLOPRIM) 100 MG Tab TAKE 1 TABLET BY MOUTH EVERY DAY 90 Tab 0   • clopidogrel (PLAVIX) 75 MG Tab TAKE 1 TABLET BY MOUTH EVERY DAY 90 Tab 1   • Insulin Pen Needle 32 G x 4 mm (BD PEN NEEDLE SWATI U/F) USE FOR INSULIN SHOTS FIVE TIMES DAILY 500 Each 3   • fluoxetine (PROZAC) 40 MG capsule Take 1 Cap by mouth every day. 90 Cap 3   • losartan (COZAAR) 50 MG Tab Take 1 Tab by mouth 2 Times a Day. 180 Tab 3   • TRULICITY 1.5 MG/0.5ML Solution Pen-injector INJECT CONTENTS OF 1 SYRINGE SUBCUTANEOUSLY EVERY 7 DAYS ON TUESDAY 6 mL 1   • metoprolol SR (TOPROL XL) 100 MG TABLET SR 24 HR Take 1 Tab by mouth every day. 90 Tab 3   • amLODIPine (NORVASC) 5 MG Tab Take 1 Tab by mouth every day. 90 Tab 3   • Multiple Vitamin (MULTI VITAMIN MENS PO) Take  by mouth.     • potassium chloride (KLOR-CON) 8 MEQ tablet TAKE 1 TABLET BY MOUTH EVERY DAY 90 Tab 3   • ONE TOUCH ULTRA TEST strip USE TO TEST FIVE TIMES DAILY 600 Strip 1   • aspirin EC (ECOTRIN) 81 MG Tablet Delayed Response Take 81 mg by mouth every day.     • Probiotic Product (PROBIOTIC DAILY PO) Take 1 Cap by mouth 2 Times a Day.     • magnesium oxide (MAG-OX) 400 MG Tab Take 400 mg by mouth every day.     • finasteride (PROSCAR) 5 MG Tab Take 1 Tab by mouth every day. 30 Tab 0   • alfuzosin (UROXATRAL) 10 MG SR tablet Take 10 mg by mouth every day.     • methenamine hip (HIPPREX) 1 GM Tab Take 1 g by mouth 2 times a day.     • Insulin Glargine (TOUJEO MAX SOLOSTAR) 300 UNIT/ML Solution Pen-injector Inject 28 Units as instructed every bedtime.     •  acetaminophen (TYLENOL) 500 MG Tab Take 1,000 mg by mouth every 6 hours as needed for Mild Pain or Moderate Pain.     • Cholecalciferol (VITAMIN D) 2000 units Cap Take 4,000 Units by mouth 2 Times a Day.       No current facility-administered medications for this visit.          Physical Exam:   Appearance: Well developed, well nourished, no acute distress  Eyes: PERRL, EOM intact, sclera white, conjunctiva moist  Ears: no lesions or deformities  Hearing: grossly intact  Nose: no lesions or deformities  Heart auscultation: no murmur rub or gallop  Extremities: no cyanosis or edema  Abdomen: soft   Gait and Station: Patient is in a wheelchair  Digits and nails: no clubbing, cyanosis, petechiae or nodes.  Cranial nerves: grossly intact  Skin: no visible rashes, lesions or ulcers noted  Orientation: Oriented to time, person and place  Mood and affect: mood and affect appropriate, normal interaction with examiner  Judgement: Intact    Assessment:  1. ASHLEY (obstructive sleep apnea)  Polysomnography Titration    zolpidem (AMBIEN) 5 MG Tab   2. Essential hypertension, benign     3. Paroxysmal atrial fibrillation (HCC)     4. Psychophysiological insomnia           Plan  Discussed the cardiovascular and neuropsychiatric risks of untreated ASHLEY; including but not limited to: HTN, DM, MI, ASCVD, CVA, CHF, traffic accidents.     1. PSG from 10/10/20 indicated severe OAS with AHI of 81/hr and O2 bartolo 68%. Sleep related hypoxia. PVC. The apnea index was 50.51 per hour and the hypopnea index was 30.45 per hour resulting in an overall AHI of 80.96. AHI during rem was 0.0 and AHI while supine was 79.76. 10% of the apneas were central apneas. There was a mean oxygen saturation of 93.0% with a minimum oxygen saturation of 68.0%. Time spent with oxygen saturations below 89% was 80.9 minutes.  Recommendation:  Due to poor sleep maintenance, he was not able to have a split night study. Recommend the patient return for a CPAP titration.      Additional educational information regarding sleep apnea and management was provided to the patient for further review if needed.    2.  Patient is amenable to a titration study.  Order PSG CPAP/BiPAP titration. Ambien 5 mg #2 prn sleep study was sent electronically to the pharmacy. PDMP was reviewed.  Will message patient with results and initiate therapy as indicated.     3. Follow up with the appropriate healthcare practitioners for all other medical problems and issues.  4. Sleep hygiene discussed. Recommend keeping a set sleep/wake schedule. Logging enough hours of sleep. Limiting/Avoiding naps during the day to 30-40 min max. No caffeine after noon and no heavy meals in the evening. Advised patient to turn off TV at bedtime. Hopefully sleep hygiene and insomnia will improve with apnea improvement.   5. Continue to f/u with Dr. Tucker, cardiology, for Afib and BP management, take BP medication as directed and check BP at home.   6. F/u in 3 months.       ALYSSA Irwin.      This dictation was created using voice recognition software. The accuracy of the dictation is limited to the abilities of the software. I expect there may be some errors of grammar and possibly content.

## 2020-11-10 DIAGNOSIS — E11.3293 CONTROLLED TYPE 2 DIABETES MELLITUS WITH BOTH EYES AFFECTED BY MILD NONPROLIFERATIVE RETINOPATHY WITHOUT MACULAR EDEMA, WITHOUT LONG-TERM CURRENT USE OF INSULIN (HCC): ICD-10-CM

## 2020-11-17 ENCOUNTER — TELEMEDICINE (OUTPATIENT)
Dept: CARDIOLOGY | Facility: MEDICAL CENTER | Age: 75
End: 2020-11-17
Payer: MEDICARE

## 2020-11-17 ENCOUNTER — APPOINTMENT (RX ONLY)
Dept: URBAN - METROPOLITAN AREA CLINIC 35 | Facility: CLINIC | Age: 75
Setting detail: DERMATOLOGY
End: 2020-11-17

## 2020-11-17 VITALS
WEIGHT: 195 LBS | DIASTOLIC BLOOD PRESSURE: 79 MMHG | BODY MASS INDEX: 26.41 KG/M2 | SYSTOLIC BLOOD PRESSURE: 136 MMHG | HEART RATE: 68 BPM | HEIGHT: 72 IN

## 2020-11-17 DIAGNOSIS — I77.89 TIBIAL ARTERY DISEASE (HCC): ICD-10-CM

## 2020-11-17 DIAGNOSIS — I10 ESSENTIAL HYPERTENSION, BENIGN: ICD-10-CM

## 2020-11-17 DIAGNOSIS — E11.69 TYPE 2 DIABETES MELLITUS WITH OTHER SPECIFIED COMPLICATION, WITH LONG-TERM CURRENT USE OF INSULIN (HCC): ICD-10-CM

## 2020-11-17 DIAGNOSIS — F33.1 MODERATE EPISODE OF RECURRENT MAJOR DEPRESSIVE DISORDER (HCC): ICD-10-CM

## 2020-11-17 DIAGNOSIS — E11.59 HYPERTENSION ASSOCIATED WITH DIABETES (HCC): ICD-10-CM

## 2020-11-17 DIAGNOSIS — E11.21 DIABETIC NEPHROPATHY ASSOCIATED WITH TYPE 2 DIABETES MELLITUS (HCC): ICD-10-CM

## 2020-11-17 DIAGNOSIS — I25.10 CORONARY ARTERY DISEASE INVOLVING NATIVE CORONARY ARTERY OF NATIVE HEART WITHOUT ANGINA PECTORIS: ICD-10-CM

## 2020-11-17 DIAGNOSIS — E11.69 HYPERLIPIDEMIA ASSOCIATED WITH TYPE 2 DIABETES MELLITUS (HCC): ICD-10-CM

## 2020-11-17 DIAGNOSIS — N18.31 STAGE 3A CHRONIC KIDNEY DISEASE: ICD-10-CM

## 2020-11-17 DIAGNOSIS — I48.0 PAROXYSMAL ATRIAL FIBRILLATION (HCC): ICD-10-CM

## 2020-11-17 DIAGNOSIS — Z86.73 HISTORY OF CEREBROVASCULAR ACCIDENT (CVA) IN ADULTHOOD: ICD-10-CM

## 2020-11-17 DIAGNOSIS — E78.5 HYPERLIPIDEMIA ASSOCIATED WITH TYPE 2 DIABETES MELLITUS (HCC): ICD-10-CM

## 2020-11-17 DIAGNOSIS — D485 NEOPLASM OF UNCERTAIN BEHAVIOR OF SKIN: ICD-10-CM

## 2020-11-17 DIAGNOSIS — Z79.4 TYPE 2 DIABETES MELLITUS WITH OTHER SPECIFIED COMPLICATION, WITH LONG-TERM CURRENT USE OF INSULIN (HCC): ICD-10-CM

## 2020-11-17 DIAGNOSIS — G81.94 LEFT HEMIPARESIS (HCC): ICD-10-CM

## 2020-11-17 DIAGNOSIS — I15.2 HYPERTENSION ASSOCIATED WITH DIABETES (HCC): ICD-10-CM

## 2020-11-17 DIAGNOSIS — G47.33 OSA (OBSTRUCTIVE SLEEP APNEA): ICD-10-CM

## 2020-11-17 DIAGNOSIS — I73.9 PVD (PERIPHERAL VASCULAR DISEASE) (HCC): ICD-10-CM

## 2020-11-17 PROBLEM — D48.5 NEOPLASM OF UNCERTAIN BEHAVIOR OF SKIN: Status: ACTIVE | Noted: 2020-11-17

## 2020-11-17 PROCEDURE — ? BIOPSY BY SHAVE METHOD

## 2020-11-17 PROCEDURE — 11102 TANGNTL BX SKIN SINGLE LES: CPT

## 2020-11-17 PROCEDURE — 99215 OFFICE O/P EST HI 40 MIN: CPT | Mod: 95,CR | Performed by: INTERNAL MEDICINE

## 2020-11-17 RX ORDER — ZOLPIDEM TARTRATE 5 MG/1
TABLET ORAL
COMMUNITY
End: 2020-11-17

## 2020-11-17 RX ORDER — ZOLPIDEM TARTRATE 5 MG/1
TABLET ORAL
COMMUNITY
Start: 2020-11-10 | End: 2020-11-17

## 2020-11-17 ASSESSMENT — ENCOUNTER SYMPTOMS: BRUISES/BLEEDS EASILY: 1

## 2020-11-17 ASSESSMENT — LOCATION ZONE DERM: LOCATION ZONE: ARM

## 2020-11-17 ASSESSMENT — LOCATION SIMPLE DESCRIPTION DERM: LOCATION SIMPLE: LEFT FOREARM

## 2020-11-17 ASSESSMENT — LOCATION DETAILED DESCRIPTION DERM: LOCATION DETAILED: LEFT PROXIMAL RADIAL DORSAL FOREARM

## 2020-11-17 ASSESSMENT — FIBROSIS 4 INDEX: FIB4 SCORE: 2.73

## 2020-11-17 NOTE — LETTER
[facility]   [address], [facility] [address]  [place] [address]  Phone: [phone]  Fax: [phone]              Av Bob  1945    Encounter Date: 11/17/2020    Osvaldo Tucker M.D.          PROGRESS NOTE:      Cardiology Telemedicine Follow-up Consultation Note    Date of note:    11/17/2020  Primary Care Provider: Mitchell Pantoja M.D.  Referring Provider: Leonardo.     Patient Name: Av Bob   YOB: 1945  MRN:              [id number]    Chief Complaint: CAD    History of Present Illness: Av Bob is a 75 y.o. male whose current medical problems include CKD stage III, hypertension, CAD  (GIOVANNA to RCA in '97, GIOVANNA X2 to LAD and GIOVANNA X to OM in '09), atrial fibrillation, diabetes, dyslipidemia, gout, CVA 12/2016,  non-hodgkins lymphoma c/b brain lesions with resultant left hemiparesis s/p chemotherapy and depression who is here for follow-up.     At our visit, 4/3/2018:  In terms of his CVA, this was in 2016, and while he was on aspirin and plavix at [facility].  This was in the setting of active lymphoma.    He was also admitted to [facility] again in 5/2017 for sepsis and was noted to have atrial fibrillation at this time. He has no noted recurrence and no episodes before that.  He has never been on anticoagulation for Cva prevention.     In terms of his NHL, he sees Dr. Noel, and his last PET scan showed he was cancer free.     Right leg wounds, currently healing. Evaluated by Dr. Terry for bilateral tibial artery stenosis, and decided against surgery at this time.     At our visit, 10/9/2018:  Started xarelto and had episodes of melena.  Switched back to plavix.  FOBT was negative.     At our visit, 4/16/2019:  Admitted 11/21/2018 for weakness, negative cerebrovascular work-up.     At our visit, 10/8/2019:  In terms of hypertension, well controlled. Sometimes 90s/60s. Asymptomatic.          In terms of cancer, no e/o recurrence.     Still works with  an hour three times a week, no symptoms.    Was admitted for UTI this summer.     Did have peripheral angiogram, noted stenosis but no intervention performed. At [facility].     At our visit, 5/6/2020:  Cholesterol was poorly controlled, I increased his lipitor.     Hospitalized 1/21/2020 for hematuria and UTI requiring bladder irrigation.     Hypertension poorly controlled recently. Did just increase losartan to 50mg PO bid. Readings still in the 150s/90s. No changes in salt or diet.      + leg cramping still. And neuropathy.     Fatigued possibly improved on lower dose of metoprolol.     Interim Events:  In terms of Cad, no angina.     In terms of PSVT, no palpitations.     Hypertension well controlled usually in the 120s.     Still with leg cramping on occasion.       Review of Systems   Hematologic/Lymphatic: Bruises/bleeds easily.   Constitution: Negative for chills, fever and night sweats.   HENT: Negative for nosebleeds.    Eyes: Negative for vision loss in left eye and vision loss in right eye.   Respiratory: Negative for hemoptysis.    Gastrointestinal: Negative for hematemesis, hematochezia and melena.   Genitourinary: Negative for hematuria.   Neurological: Negative for new focal weakness, numbness and paresthesias.     All other systems reviewed and are negative.         Past Medical History:   Diagnosis Date   • (HCC) Chronic ulcer of right lower extremity with fat layer exposed 12/27/2018   • Acute on chronic renal failure ([facility]) 1/21/2020   • Acute respiratory failure with hypoxia ([facility]) 5/20/2017   • CAD (coronary artery disease)     GIOVANNA to RCA in '97, GIOVANNA X2 to LAD and GIOVANNA X2 to OM in '09   • Cancer ([facility])     2017; chemo lympoma   • Cataract    • Cerebrovascular accident (CVA) ([facility]) 12/30/2016    Left arm weakness  etiology of stroke not established, lymphoma discovered on MRI evaluation of stroke, L hemiparesis much worse  after acute infectious illness in mid 2017, but no specific diagnosed recurrent neurological etiology, all at [facility]   • Chickenpox    • CKD (chronic kidney disease) stage 3, GFR 30-59 ml/min    • Controlled gout 2014   • Coronary atherosclerosis of native coronary artery     S/P PTCA (percutaneous transluminal coronary angioplasty), RCA, 5/1997, patent on cath 7/10/2009 at the time of interventions on his left anterior descending and circumflex coronary arteries   • Daytime sleepiness    • Depression    • Diabetes ([facility])    • Difficulty swallowing    • Enterococcal septicemia ([facility]) 8/12/2017   • Roberto hematuria 1/29/2020   • Frequent urination    • GERD (gastroesophageal reflux disease)    • Mosotho measles    • Hypertension    • Hypokalemia 2012    controlled with combination of ACE inhibitor or ARB plus spironolactone   • Hypomagnesemia 08/12/2017    etiology uncertain   • Influenza A 1/26/2020   • Insomnia    • Kidney stone    • Leg edema, right 1/22/2020   • 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 Barry   • Normocytic hypochromic anemia 5/20/2017   • NSTEMI (non-ST elevated myocardial infarction) ([facility]) 07/18/2017    complicating UTI with sepsis   • Pain    • Peripheral vascular disease ([facility])    • Polyneuropathy in diabetes(357.2) 9/11/2013   • Renal mass 1/21/2020   • Septic shock ([facility]) 5/20/2017   • Skin ulcer of calf ([facility]) 2015    Right, Dr. Terry and wound care   • Stroke ([facility]) 2016    left sided weakness   • Urinary bladder disorder    • Urinary incontinence    • Weakness    • Wound of left leg 2012    Requiring surgery and debridment, Dr. Moore         Past Surgical History:   Procedure Laterality Date   • LUMBAR TRANSFORAMINAL EPIDURAL STEROID INJECTION Right 11/2/2020    Procedure: INJECTION, STEROID, SPINE, LUMBAR, EPIDURAL, TRANSFORAMINAL APPROACH;  Surgeon: Harpal BURKETT  ADRIANA Bennett;  Location: [facility];  Service: Pain Management   • CYSTOSCOPY STENT PLACEMENT Left 2/12/2018    Procedure: CYSTOSCOPY  ;  Surgeon: Rey Barry M.D.;  Location: SURGERY [facility];  Service: Urology   • URETEROSCOPY Left 2/12/2018    Procedure: URETEROSCOPY- FLEXIBLE  ;  Surgeon: Rey Barry M.D.;  Location: SURGERY [facility];  Service: Urology   • LASERTRIPSY Left 2/12/2018    Procedure: LASERTRIPSY - LITHO  ;  Surgeon: Rey Barry M.D.;  Location: SURGERY [facility];  Service: Urology   • WOUND CLOSURE GENERAL  4/3/2012    Performed by GERHARD GILBERT at SURGERY SAME DAY [facility] ORS   • ZZZ CARDIAC CATH  2009    Stents to LAD, Om   • DAGOBERTO BY LAPAROSCOPY  1998   • ANGIOPLASTY  1997    RCA followed by other stents as noted above.    • ZZZ CARDIAC CATH  1997    stent RCA   • CATARACT EXTRACTION WITH IOL      bilateral   • LITHOTRIPSY     • TONSILLECTOMY     • TONSILLECTOMY AND ADENOIDECTOMY           Current Outpatient Medications   Medication Sig Dispense Refill   • glucose blood strip 1 Strip by Other route 3 times a day for 90 days. 300 Strip 3   • fenofibrate (TRICOR) 145 MG Tab TAKE 1 TABLET BY MOUTH EVERY DAY 90 Tab 3   • insulin lispro (HUMALOG) 100 UNIT/ML Solution Pen-injector injection PEN INJECT 20 UNITS UNDER THE SKIN AS INSTRUCTED DAILY 96 mL 2   • atorvastatin (LIPITOR) 40 MG Tab Take 1 Tab by mouth every evening. 90 Tab 3   • TOUJEO SOLOSTAR 300 UNIT/ML Solution Pen-injector      • allopurinol (ZYLOPRIM) 100 MG Tab TAKE 1 TABLET BY MOUTH EVERY DAY 90 Tab 0   • clopidogrel (PLAVIX) 75 MG Tab TAKE 1 TABLET BY MOUTH EVERY DAY 90 Tab 1   • Insulin Pen Needle 32 G x 4 mm (BD PEN NEEDLE SWATI U/F) USE FOR INSULIN SHOTS FIVE TIMES DAILY 500 Each 3   • fluoxetine (PROZAC) 40 MG capsule Take 1 Cap by mouth every day. 90 Cap 3   • losartan (COZAAR) 50 MG Tab Take 1 Tab by mouth 2 Times a Day. 180 Tab 3   • TRULICITY 1.5 MG/0.5ML Solution Pen-injector  INJECT CONTENTS OF 1 SYRINGE SUBCUTANEOUSLY EVERY 7 DAYS ON TUESDAY 6 mL 1   • metoprolol SR (TOPROL XL) 100 MG TABLET SR 24 HR Take 1 Tab by mouth every day. 90 Tab 3   • amLODIPine (NORVASC) 5 MG Tab Take 1 Tab by mouth every day. 90 Tab 3   • Multiple Vitamin (MULTI VITAMIN MENS PO) Take  by mouth.     • potassium chloride (KLOR-CON) 8 MEQ tablet TAKE 1 TABLET BY MOUTH EVERY DAY 90 Tab 3   • aspirin EC (ECOTRIN) 81 MG Tablet Delayed Response Take 81 mg by mouth every day.     • Probiotic Product (PROBIOTIC DAILY PO) Take 1 Cap by mouth 2 Times a Day.     • magnesium oxide (MAG-OX) 400 MG Tab Take 400 mg by mouth every day.     • finasteride (PROSCAR) 5 MG Tab Take 1 Tab by mouth every day. 30 Tab 0   • alfuzosin (UROXATRAL) 10 MG SR tablet Take 10 mg by mouth every day.     • methenamine hip (HIPPREX) 1 GM Tab Take 1 g by mouth 2 times a day.     • Insulin Glargine (TOUJEO MAX SOLOSTAR) 300 UNIT/ML Solution Pen-injector Inject 28 Units as instructed every bedtime.     • acetaminophen (TYLENOL) 500 MG Tab Take 1,000 mg by mouth every 6 hours as needed for Mild Pain or Moderate Pain.     • Cholecalciferol (VITAMIN D) 2000 units Cap Take 4,000 Units by mouth 2 Times a Day. Once a day       No current facility-administered medications for this visit.          Allergies   Allergen Reactions   • Diphenhydramine Hcl Anxiety     Pt is able to tolerate  Mg benadryl with less anxiety   • Lorazepam Unspecified     Disorientation   • Ciprofloxacin      Rash,stomach ache   • Spironolactone      Acute kidney injury         Family History   Problem Relation Age of Onset   • Heart Disease Father         CAD   • Diabetes Father    • Cancer Mother    • Psychiatric Illness Mother         Depression   • Depression Mother    • Kidney stones Brother    • Heart Disease Brother    • Psychiatric Illness Brother         Depression   • Depression Brother    • Suicide Attempts Other    • Psychiatric Illness Other         autism          Social History     Socioeconomic History   • Marital status:      Spouse name: Not on file   • Number of children: Not on file   • Years of education: Not on file   • Highest education level: Not on file   Occupational History   • Not on file   Social Needs   • Financial resource strain: Not on file   • Food insecurity     Worry: Not on file     Inability: Not on file   • Transportation needs     Medical: Not on file     Non-medical: Not on file   Tobacco Use   • Smoking status: Never Smoker   • Smokeless tobacco: Never Used   • Tobacco comment: continued abstinence   Substance and Sexual Activity   • Alcohol use: Never   • Drug use: Never   • Sexual activity: Not Currently     Partners: Female     Comment: , one daughter, 2 grands   Lifestyle   • Physical activity     Days per week: Not on file     Minutes per session: Not on file   • Stress: Not on file   Relationships   • Social connections     Talks on phone: Not on file     Gets together: Not on file     Attends Yazidism service: Not on file     Active member of club or organization: Not on file     Attends meetings of clubs or organizations: Not on file     Relationship status: Not on file   • Intimate partner violence     Fear of current or ex partner: Not on file     Emotionally abused: Not on file     Physically abused: Not on file     Forced sexual activity: Not on file   Other Topics Concern   •  Service Yes   • Blood Transfusions No   • Caffeine Concern No   • Occupational Exposure No   • Hobby Hazards No   • Sleep Concern Yes   • Stress Concern No   • Weight Concern No   • Special Diet No   • Back Care No   • Exercise Yes   • Bike Helmet No   • Seat Belt Yes   • Self-Exams Yes   Social History Narrative    Av is from [place], [place] and raised in [place]. He has been in [place] since 2004. 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  He has been  to Usha since 2003, they met through a mutual friend. He has a daughter (Kalina) and a step son (Jimi).          Physical Exam:  Ambulatory Vitals  /79 (BP Location: Left arm, Patient Position: Sitting, BP Cuff Size: Adult)   Pulse 68   Ht 1.829 m (6')   Wt 88.5 kg (195 lb)    Oxygen Therapy:  Pulse Oximetry: (doesn't have a pulse ox)  BP Readings from Last 4 Encounters:   11/17/20 136/79   11/09/20 126/74   11/02/20 138/77   10/15/20 120/80       Weight/BMI: Body mass index is 26.45 kg/m².  Wt Readings from Last 4 Encounters:   11/17/20 88.5 kg (195 lb)   11/09/20 88.5 kg (195 lb)   11/02/20 88.7 kg (195 lb 8.8 oz)   10/15/20 88.5 kg (195 lb)       General: No apparent distress. Pale.   Eyes: nl conjunctiva  Neck: JVP appeared normal from afar  Lungs: normal respiratory effort  Neurological: ZHANG  Psychiatric: Appropriate affect, A/O x 3, intact judgement and insight  Skin: no visible rashes    Exam repeated in full and unchanged except for as noted above.      Lab Data Review:  Lab Results   Component Value Date/Time    CHOLSTRLTOT 120 05/07/2020 12:12 PM    LDL 73 05/07/2020 12:12 PM    HDL 30 (A) 05/07/2020 12:12 PM    TRIGLYCERIDE 84 05/07/2020 12:12 PM       Lab Results   Component Value Date/Time    SODIUM 138 08/17/2020 02:29 PM    POTASSIUM 3.7 08/17/2020 02:29 PM    CHLORIDE 103 08/17/2020 02:29 PM    CO2 22 08/17/2020 02:29 PM    GLUCOSE 91 08/17/2020 02:29 PM    BUN 31 (H) 08/17/2020 02:29 PM    CREATININE 1.44 (H) 08/17/2020 02:29 PM    CREATININE 1.4 05/28/2008 05:42 PM    BUNCREATRAT 10 03/27/2017 02:11 PM     Lab Results   Component Value Date/Time    ALKPHOSPHAT 91 01/26/2020 04:39 PM    ASTSGOT 59 (H) 01/26/2020 04:39 PM    ALTSGPT 33 01/26/2020 04:39 PM    TBILIRUBIN 0.4 01/26/2020 04:39 PM      Lab Results   Component Value  Date/Time    WBC 9.5 08/17/2020 02:29 PM     No components found for: HBGA1C  No components found for: TROPONIN  No components found for: BNP      Cardiac Imaging and Procedures Review:    EKG dated 1/26/2020: personally reviewed and showed NSR, artifact, IVCD, late precordial R/S transitoin and prolonged QTc interval.     Echo dated 11/21/2018:  CONCLUSIONS  Prior echocardiogram 10/5/2017 - no noted changes.  The effusion is   new.  Left ventricular ejection fraction is visually estimated to be 60%.  Normal inferior vena cava size and inspiratory collapse.  Trivial to small pericardial effusion noted without evidence of   hemodynamic compromise.  No significant valve disease or flow abnormalities.     Holter, 5/30/2018:    CONCLUSIONS:  * paroxysmal supraventricular tachycardia.  15 episodes over 13 days.  Longest episode 17 seconds  * No atrial fibrillation  * No significant ectopy  * No arrhythmias or ectopy during symptoms      Nuclear Perfusion Imaging (1/2018):   Myocardial Perfusion   Report   NUCLEAR IMAGING INTERPRETATION   The LV is mildly dilated with global hypokinesis.  Calculated LV EF is 49%.     There is a small region of decreased perfusion in the inferior-lateral wall    with a mild amount of inducible ischemia.   ECG INTERPRETATION   Negative stress ECG for ischemia.      Stress test 6/2017 ([facility])    Findings:   Small in size moderate in intensity fixed apical defect. Computer analysis also suggests mild in intensity small in size diminished counts along inferior wall of left ventricle which appears to improve at time of rest. Summed stress score is   7. Summed rest score is 1. Gross hypomotility of the left ventricle is noted with disordered contraction evident. Global hypokinesis is noted.      [facility] (2009): at [facility], last stent.     Radiology test Review:  CXR:1/26/2020:  IMPRESSION:     Stable chest without acute/new abnormality.    Vascular US 2/2018:  Vascular  Laboratory   Conclusions   There is no evidence of arterial disease demonstrated (PADMINI is .9-1.0).    Absent flow within the Left VIVIENNE.     Vascular Laboratory   CONCLUSIONS   Right Lower Extremity-   Normal flow from the common femoral artery extending distally to the    popliteal artery. Flow within the posterior tibial artery is brisk and    multiphasic.   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     Left Lower Extremity-   Normal flow seen from the common femoral artery extending distally to the    popliteal artery.   Area of elevated velocities seen within the mid segment of the posterior    tibial artery. Consistent with >50% stenosis.   Occlusion of the anterior tibial artery at the mid-distal segment, minimal    flow reversal seen at the level of the ankle.      MRI 12/31/2016  7 mm acute/early subacute infarct is present in the right anterolateral medulla.  There is no significant mass effect or hemorrhage.  No other recent infarction.    12/31/2016:  Three sites of abnormal parenchymal enhancement are present.  *  At the previously seen right anterior pontine lesion, there is a 5 x 3 x 7 mm elongated enhancing peripheral pontine lesion.  *  There is a second 2 mm enhancing focus also in the right anterior david.  *  There is a third 3 mm lesion at the left globus pallidus internus.    These findings may represent metastatic disease.  The elongated morphology of the anterior peripheral pontine lesion is somewhat atypical for metastatic lesion, and could potentially represent enhancement of a subacute infarct.  Two other lesions could also, in theory, represent subacute enhancing reperfusion of lacunar infarctions. Anecdotally, this would be an unusual finding in lacunar infarctions. Note that post infarct enhancement typically occurs approximately 1 week after infarction and resolves within 12 weeks or less.       Head CTA  2016:  1. There are plaque formations identified in both carotid bifurcations   (right greater than left). This causes approximately 50% stenosis on the   right and 10-20% stenosis on the left in the proximal internal carotid   arteries. The remainder of the carotid   vasculature is unremarkable.  2. The vertebral arteries are within normal limits.  3. No dissection.  4. A 5.5 mm left thyroid lobe cyst/nodule is present.  5. A 10 x 13 mm partially calcified pulmonary nodule is present in the   left upper lobe. There may be some internal fat density. Findings could   represent a benign pulmonary hamartoma. Recommend clinical correlation.  6. Moderate to advanced degenerative changes are present throughout the   cervical spine.      Medical Decision Makin. Atherosclerosis of native coronary artery of native heart without angina pectoris  S/p 5 stents, last in . Mild ischemia on two recent nuclear perfusion scans    Currently without recurrent chest pain (had some atypical chest pain ) so will treat medically.  -continue aspirin/lipitor  -fasting blood tests ordered for next labs, lipid panel.     2. 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 Rasheed, now well controlled, last hgbA1c <7    3. Essential hypertension, benign  Now poorly controlled.   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   -Continue losartan 50mg PO bid  -if BP remains >130/85 in 1 week, would start norvasc 5mg PO daily    4. Paroxysmal atrial fibrillation (CMS-HCC)  Self limited and one time in setting of sepsis. Previously discussed at length risks of recurrent CVA given his previous likely embolic event, and if he would like an ILR to confirm diagnosis or to start empiric anticoagulation.  He prefers the later and we started xarelto 2018.  Unfortunately he had melena almost immediately, and was  switched back to aspirin/plavix.  He does not want to retrial anticoagulation, or a GI referral as he sees enough physicians already.   -continue aspirin/plavix. No anticoag for now given patient preference despite risk of recurrent CVA.    -continue metoprolol XL 100mg PO Daily, dose decreased from prior due to fatigue.     5. CKD (chronic kidney disease) stage 3, GFR 30-59 ml/min  Stable.  -avoid spironolactone  -continue losartan  -followed by Dr. Jarvis.     6. Diffuse large cell non-Hodgkin's lymphoma (CMS-HCC)  Followed by Dr. Noel, in remission per report with recent negative PET scan.    7. Left hemiparesis (CMS-HCC)  Acute 12/2016 at [facility].  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  Symptoms originally improved significantly with chemotherapy and he was walking well, and then hemiparesis worsened significantly after sepsis from UTI and norovirus infection mid 2017.  Currently continuing physical therapy, but remains in a wheelchair.     8. Cerebrovascular accident (CVA) due to embolism of cerebral artery (CMS-HCC)  In addition to cerebral enhancing lesions thought to be lymphoma on MRI 12/2016, he was found to have a subacute infarct in the right anterolateral medulla.    -aspirin/plavix for CVA prevention    9. Dyslipidemia  Lipitor, fenofibrate. Recheck lipids with next labs.     10. Peripheral vascular disease (CMS-HCC)  Followed by Dr. Terry previously but now followed by Dr. Mohsen at [facility]. No recent intervention. Contributing to non-healing wounds.   -continue aspirin/plavix     11. Goals of care - this is completed, they have scanned in a card with the number to cloud access to their documents.     This evaluation was conducted via Zoom using secure and encrypted videoconferencing technology. The patient was in a private location in the state of [place].    The patient's identity was confirmed and verbal consent was  obtained for this virtual visit.      No follow-ups on file.       Osvaldo Tucker MD  [facility] [address]  [address] [place] [address], [facility] [address]  [place] [address]  Phone: [phone]  Fax: [phone]                    Madison Bunch D.O.  [address] [facility]  [address]  [facility] [address]  Via In Basket

## 2020-11-17 NOTE — PROGRESS NOTES
Cardiology Telemedicine Follow-up Consultation Note    Date of note:    11/17/2020  Primary Care Provider: Mitchell Pantoja M.D.  Referring Provider: Leonardo.     Patient Name: Av Bob   YOB: 1945  MRN:              [id number]    Chief Complaint: CAD    History of Present Illness: Av Bob is a 75 y.o. male whose current medical problems include CKD stage III, hypertension, CAD  (GIOVANNA to RCA in '97, GIOVANNA X2 to LAD and GIOVANNA X to OM in '09), atrial fibrillation, diabetes, dyslipidemia, gout, CVA 12/2016,  non-hodgkins lymphoma c/b brain lesions with resultant left hemiparesis s/p chemotherapy and depression who is here for follow-up.     At our visit, 4/3/2018:  In terms of his CVA, this was in 2016, and while he was on aspirin and plavix at [facility].  This was in the setting of active lymphoma.    He was also admitted to [facility] again in 5/2017 for sepsis and was noted to have atrial fibrillation at this time. He has no noted recurrence and no episodes before that.  He has never been on anticoagulation for Cva prevention.     In terms of his NHL, he sees Dr. Noel, and his last PET scan showed he was cancer free.     Right leg wounds, currently healing. Evaluated by Dr. Terry for bilateral tibial artery stenosis, and decided against surgery at this time.     At our visit, 10/9/2018:  Started xarelto and had episodes of melena.  Switched back to plavix.  FOBT was negative.     At our visit, 4/16/2019:  Admitted 11/21/2018 for weakness, negative cerebrovascular work-up.     At our visit, 10/8/2019:  In terms of hypertension, well controlled. Sometimes 90s/60s. Asymptomatic.     In terms of cancer, no e/o recurrence.     Still works with  an hour three times a week, no symptoms.    Was admitted for UTI this summer.     Did have peripheral angiogram, noted stenosis but no intervention performed. At [facility].     At our visit,  5/6/2020:  Cholesterol was poorly controlled, I increased his lipitor.     Hospitalized 1/21/2020 for hematuria and UTI requiring bladder irrigation.     Hypertension poorly controlled recently. Did just increase losartan to 50mg PO bid. Readings still in the 150s/90s. No changes in salt or diet.      + leg cramping still. And neuropathy.     Fatigued possibly improved on lower dose of metoprolol.     Interim Events:  In terms of Cad, no angina.     In terms of PSVT, no palpitations.     Hypertension well controlled usually in the 120s.     Still with leg cramping on occasion.       Review of Systems   Hematologic/Lymphatic: Bruises/bleeds easily.   Constitution: Negative for chills, fever and night sweats.   HENT: Negative for nosebleeds.    Eyes: Negative for vision loss in left eye and vision loss in right eye.   Respiratory: Negative for hemoptysis.    Gastrointestinal: Negative for hematemesis, hematochezia and melena.   Genitourinary: Negative for hematuria.   Neurological: Negative for new focal weakness, numbness and paresthesias.     All other systems reviewed and are negative.         Past Medical History:   Diagnosis Date   • (HCC) Chronic ulcer of right lower extremity with fat layer exposed 12/27/2018   • Acute on chronic renal failure (HCC) 1/21/2020   • Acute respiratory failure with hypoxia ([facility]) 5/20/2017   • CAD (coronary artery disease)     GIOVANNA to RCA in '97, GIOVANNA X2 to LAD and GIOVANNA X2 to OM in '09   • Cancer ([facility])     2017; chemo lympoma   • Cataract    • Cerebrovascular accident (CVA) ([facility]) 12/30/2016    Left arm weakness  etiology of stroke not established, lymphoma discovered on MRI evaluation of stroke, L hemiparesis much worse after acute infectious illness in mid 2017, but no specific diagnosed recurrent neurological etiology, all at [facility]   • Chickenpox    • CKD (chronic kidney disease) stage 3, GFR 30-59 ml/min    • Controlled gout 2014   • Coronary  atherosclerosis of native coronary artery     S/P PTCA (percutaneous transluminal coronary angioplasty), RCA, 5/1997, patent on cath 7/10/2009 at the time of interventions on his left anterior descending and circumflex coronary arteries   • Daytime sleepiness    • Depression    • Diabetes ([facility])    • Difficulty swallowing    • Enterococcal septicemia ([facility]) 8/12/2017   • Roberto hematuria 1/29/2020   • Frequent urination    • GERD (gastroesophageal reflux disease)    • Israeli measles    • Hypertension    • Hypokalemia 2012    controlled with combination of ACE inhibitor or ARB plus spironolactone   • Hypomagnesemia 08/12/2017    etiology uncertain   • Influenza A 1/26/2020   • Insomnia    • Kidney stone    • Leg edema, right 1/22/2020   • Lymphoma ([facility]) 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 Barry   • Normocytic hypochromic anemia 5/20/2017   • NSTEMI (non-ST elevated myocardial infarction) ([facility]) 07/18/2017    complicating UTI with sepsis   • Pain    • Peripheral vascular disease ([facility])    • Polyneuropathy in diabetes(357.2) 9/11/2013   • Renal mass 1/21/2020   • Septic shock ([facility]) 5/20/2017   • Skin ulcer of calf ([facility]) 2015    Right, Dr. Terry and wound care   • Stroke ([facility]) 2016    left sided weakness   • Urinary bladder disorder    • Urinary incontinence    • Weakness    • Wound of left leg 2012    Requiring surgery and debridment, Dr. Moore         Past Surgical History:   Procedure Laterality Date   • LUMBAR TRANSFORAMINAL EPIDURAL STEROID INJECTION Right 11/2/2020    Procedure: INJECTION, STEROID, SPINE, LUMBAR, EPIDURAL, TRANSFORAMINAL APPROACH;  Surgeon: Harpal Bennett M.D.;  Location: [facility];  Service: Pain Management   • CYSTOSCOPY STENT PLACEMENT Left 2/12/2018    Procedure: CYSTOSCOPY  ;  Surgeon: Rey Barry M.D.;  Location: SURGERY [facility];  Service: Urology   • URETEROSCOPY Left 2/12/2018     Procedure: URETEROSCOPY- FLEXIBLE  ;  Surgeon: Rey Barry M.D.;  Location: SURGERY [facility];  Service: Urology   • LASERTRIPSY Left 2/12/2018    Procedure: LASERTRIPSY - LITHO  ;  Surgeon: Rey Barry M.D.;  Location: SURGERY [facility];  Service: Urology   • WOUND CLOSURE GENERAL  4/3/2012    Performed by GERHARD GILBERT at SURGERY SAME DAY [facility] ORS   • ZZZ CARDIAC CATH  2009    Stents to LAD, Om   • DAGOBERTO BY LAPAROSCOPY  1998   • ANGIOPLASTY  1997    RCA followed by other stents as noted above.    • ZZZ CARDIAC CATH  1997    stent RCA   • CATARACT EXTRACTION WITH IOL      bilateral   • LITHOTRIPSY     • TONSILLECTOMY     • TONSILLECTOMY AND ADENOIDECTOMY           Current Outpatient Medications   Medication Sig Dispense Refill   • glucose blood strip 1 Strip by Other route 3 times a day for 90 days. 300 Strip 3   • fenofibrate (TRICOR) 145 MG Tab TAKE 1 TABLET BY MOUTH EVERY DAY 90 Tab 3   • insulin lispro (HUMALOG) 100 UNIT/ML Solution Pen-injector injection PEN INJECT 20 UNITS UNDER THE SKIN AS INSTRUCTED DAILY 96 mL 2   • atorvastatin (LIPITOR) 40 MG Tab Take 1 Tab by mouth every evening. 90 Tab 3   • TOUJEO SOLOSTAR 300 UNIT/ML Solution Pen-injector      • allopurinol (ZYLOPRIM) 100 MG Tab TAKE 1 TABLET BY MOUTH EVERY DAY 90 Tab 0   • clopidogrel (PLAVIX) 75 MG Tab TAKE 1 TABLET BY MOUTH EVERY DAY 90 Tab 1   • Insulin Pen Needle 32 G x 4 mm (BD PEN NEEDLE SWATI U/F) USE FOR INSULIN SHOTS FIVE TIMES DAILY 500 Each 3   • fluoxetine (PROZAC) 40 MG capsule Take 1 Cap by mouth every day. 90 Cap 3   • losartan (COZAAR) 50 MG Tab Take 1 Tab by mouth 2 Times a Day. 180 Tab 3   • TRULICITY 1.5 MG/0.5ML Solution Pen-injector INJECT CONTENTS OF 1 SYRINGE SUBCUTANEOUSLY EVERY 7 DAYS ON TUESDAY 6 mL 1   • metoprolol SR (TOPROL XL) 100 MG TABLET SR 24 HR Take 1 Tab by mouth every day. 90 Tab 3   • amLODIPine (NORVASC) 5 MG Tab Take 1 Tab by mouth every day. 90 Tab 3   • Multiple Vitamin (MULTI  VITAMIN MENS PO) Take  by mouth.     • potassium chloride (KLOR-CON) 8 MEQ tablet TAKE 1 TABLET BY MOUTH EVERY DAY 90 Tab 3   • aspirin EC (ECOTRIN) 81 MG Tablet Delayed Response Take 81 mg by mouth every day.     • Probiotic Product (PROBIOTIC DAILY PO) Take 1 Cap by mouth 2 Times a Day.     • magnesium oxide (MAG-OX) 400 MG Tab Take 400 mg by mouth every day.     • finasteride (PROSCAR) 5 MG Tab Take 1 Tab by mouth every day. 30 Tab 0   • alfuzosin (UROXATRAL) 10 MG SR tablet Take 10 mg by mouth every day.     • methenamine hip (HIPPREX) 1 GM Tab Take 1 g by mouth 2 times a day.     • Insulin Glargine (TOUJEO MAX SOLOSTAR) 300 UNIT/ML Solution Pen-injector Inject 28 Units as instructed every bedtime.     • acetaminophen (TYLENOL) 500 MG Tab Take 1,000 mg by mouth every 6 hours as needed for Mild Pain or Moderate Pain.     • Cholecalciferol (VITAMIN D) 2000 units Cap Take 4,000 Units by mouth 2 Times a Day. Once a day       No current facility-administered medications for this visit.          Allergies   Allergen Reactions   • Diphenhydramine Hcl Anxiety     Pt is able to tolerate  Mg benadryl with less anxiety   • Lorazepam Unspecified     Disorientation   • Ciprofloxacin      Rash,stomach ache   • Spironolactone      Acute kidney injury         Family History   Problem Relation Age of Onset   • Heart Disease Father         CAD   • Diabetes Father    • Cancer Mother    • Psychiatric Illness Mother         Depression   • Depression Mother    • Kidney stones Brother    • Heart Disease Brother    • Psychiatric Illness Brother         Depression   • Depression Brother    • Suicide Attempts Other    • Psychiatric Illness Other         autism         Social History     Socioeconomic History   • Marital status:      Spouse name: Not on file   • Number of children: Not on file   • Years of education: Not on file   • Highest education level: Not on file   Occupational History   • Not on file   Social Needs   •  Financial resource strain: Not on file   • Food insecurity     Worry: Not on file     Inability: Not on file   • Transportation needs     Medical: Not on file     Non-medical: Not on file   Tobacco Use   • Smoking status: Never Smoker   • Smokeless tobacco: Never Used   • Tobacco comment: continued abstinence   Substance and Sexual Activity   • Alcohol use: Never   • Drug use: Never   • Sexual activity: Not Currently     Partners: Female     Comment: , one daughter, 2 grands   Lifestyle   • Physical activity     Days per week: Not on file     Minutes per session: Not on file   • Stress: Not on file   Relationships   • Social connections     Talks on phone: Not on file     Gets together: Not on file     Attends Tenriism service: Not on file     Active member of club or organization: Not on file     Attends meetings of clubs or organizations: Not on file     Relationship status: Not on file   • Intimate partner violence     Fear of current or ex partner: Not on file     Emotionally abused: Not on file     Physically abused: Not on file     Forced sexual activity: Not on file   Other Topics Concern   •  Service Yes   • Blood Transfusions No   • Caffeine Concern No   • Occupational Exposure No   • Hobby Hazards No   • Sleep Concern Yes   • Stress Concern No   • Weight Concern No   • Special Diet No   • Back Care No   • Exercise Yes   • Bike Helmet No   • Seat Belt Yes   • Self-Exams Yes   Social History Narrative    Av is from [place], [place] and raised in [place]. He has been in [place] since 2004. 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  He has been  to Usha since 2003, they met through a mutual friend. He has a daughter (Kalina) and a step son (Jimi).            Physical Exam:  Ambulatory Vitals  /79 (BP Location: Left arm, Patient Position: Sitting, BP Cuff Size: Adult)   Pulse 68   Ht 1.829 m (6')   Wt 88.5 kg (195 lb)    Oxygen Therapy:  Pulse Oximetry: (doesn't have a pulse ox)  BP Readings from Last 4 Encounters:   11/17/20 136/79   11/09/20 126/74   11/02/20 138/77   10/15/20 120/80       Weight/BMI: Body mass index is 26.45 kg/m².  Wt Readings from Last 4 Encounters:   11/17/20 88.5 kg (195 lb)   11/09/20 88.5 kg (195 lb)   11/02/20 88.7 kg (195 lb 8.8 oz)   10/15/20 88.5 kg (195 lb)       General: No apparent distress. Pale.   Eyes: nl conjunctiva  Neck: JVP appeared normal from afar  Lungs: normal respiratory effort  Neurological: ZHANG  Psychiatric: Appropriate affect, A/O x 3, intact judgement and insight  Skin: no visible rashes    Exam repeated in full and unchanged except for as noted above.      Lab Data Review:  Lab Results   Component Value Date/Time    CHOLSTRLTOT 120 05/07/2020 12:12 PM    LDL 73 05/07/2020 12:12 PM    HDL 30 (A) 05/07/2020 12:12 PM    TRIGLYCERIDE 84 05/07/2020 12:12 PM       Lab Results   Component Value Date/Time    SODIUM 138 08/17/2020 02:29 PM    POTASSIUM 3.7 08/17/2020 02:29 PM    CHLORIDE 103 08/17/2020 02:29 PM    CO2 22 08/17/2020 02:29 PM    GLUCOSE 91 08/17/2020 02:29 PM    BUN 31 (H) 08/17/2020 02:29 PM    CREATININE 1.44 (H) 08/17/2020 02:29 PM    CREATININE 1.4 05/28/2008 05:42 PM    BUNCREATRAT 10 03/27/2017 02:11 PM     Lab Results   Component Value Date/Time    ALKPHOSPHAT 91 01/26/2020 04:39 PM    ASTSGOT 59 (H) 01/26/2020 04:39 PM    ALTSGPT 33 01/26/2020 04:39 PM    TBILIRUBIN 0.4 01/26/2020 04:39 PM      Lab Results   Component Value Date/Time    WBC 9.5 08/17/2020 02:29 PM     No components found for: HBGA1C  No components found for: TROPONIN  No components found for: BNP      Cardiac Imaging and Procedures Review:    EKG dated 1/26/2020: personally reviewed and showed NSR, artifact, IVCD, late precordial  R/S transitoin and prolonged QTc interval.     Echo dated 11/21/2018:  CONCLUSIONS  Prior echocardiogram 10/5/2017 - no noted changes.  The effusion is   new.  Left ventricular ejection fraction is visually estimated to be 60%.  Normal inferior vena cava size and inspiratory collapse.  Trivial to small pericardial effusion noted without evidence of   hemodynamic compromise.  No significant valve disease or flow abnormalities.     Holter, 5/30/2018:    CONCLUSIONS:  * paroxysmal supraventricular tachycardia.  15 episodes over 13 days.  Longest episode 17 seconds  * No atrial fibrillation  * No significant ectopy  * No arrhythmias or ectopy during symptoms      Nuclear Perfusion Imaging (1/2018):   Myocardial Perfusion   Report   NUCLEAR IMAGING INTERPRETATION   The LV is mildly dilated with global hypokinesis.  Calculated LV EF is 49%.     There is a small region of decreased perfusion in the inferior-lateral wall    with a mild amount of inducible ischemia.   ECG INTERPRETATION   Negative stress ECG for ischemia.      Stress test 6/2017 ([facility])    Findings:   Small in size moderate in intensity fixed apical defect. Computer analysis also suggests mild in intensity small in size diminished counts along inferior wall of left ventricle which appears to improve at time of rest. Summed stress score is   7. Summed rest score is 1. Gross hypomotility of the left ventricle is noted with disordered contraction evident. Global hypokinesis is noted.      [facility] (2009): at [facility], last stent.     Radiology test Review:  CXR:1/26/2020:  IMPRESSION:     Stable chest without acute/new abnormality.    Vascular US 2/2018:  Vascular Laboratory   Conclusions   There is no evidence of arterial disease demonstrated (PADMINI is .9-1.0).    Absent flow within the Left VIVIENNE.     Vascular Laboratory   CONCLUSIONS   Right Lower Extremity-   Normal flow from the common femoral artery extending distally to the    popliteal artery. Flow  within the posterior tibial artery is brisk and    multiphasic.   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     Left Lower Extremity-   Normal flow seen from the common femoral artery extending distally to the    popliteal artery.   Area of elevated velocities seen within the mid segment of the posterior    tibial artery. Consistent with >50% stenosis.   Occlusion of the anterior tibial artery at the mid-distal segment, minimal    flow reversal seen at the level of the ankle.      MRI 12/31/2016  7 mm acute/early subacute infarct is present in the right anterolateral medulla.  There is no significant mass effect or hemorrhage.  No other recent infarction.    12/31/2016:  Three sites of abnormal parenchymal enhancement are present.  *  At the previously seen right anterior pontine lesion, there is a 5 x 3 x 7 mm elongated enhancing peripheral pontine lesion.  *  There is a second 2 mm enhancing focus also in the right anterior david.  *  There is a third 3 mm lesion at the left globus pallidus internus.    These findings may represent metastatic disease.  The elongated morphology of the anterior peripheral pontine lesion is somewhat atypical for metastatic lesion, and could potentially represent enhancement of a subacute infarct.  Two other lesions could also, in theory, represent subacute enhancing reperfusion of lacunar infarctions. Anecdotally, this would be an unusual finding in lacunar infarctions. Note that post infarct enhancement typically occurs approximately 1 week after infarction and resolves within 12 weeks or less.       Head CTA 12/30/2016:  1. There are plaque formations identified in both carotid bifurcations   (right greater than left). This causes approximately 50% stenosis on the   right and 10-20% stenosis on the left in the proximal internal carotid   arteries. The remainder of the carotid   vasculature is  unremarkable.  2. The vertebral arteries are within normal limits.  3. No dissection.  4. A 5.5 mm left thyroid lobe cyst/nodule is present.  5. A 10 x 13 mm partially calcified pulmonary nodule is present in the   left upper lobe. There may be some internal fat density. Findings could   represent a benign pulmonary hamartoma. Recommend clinical correlation.  6. Moderate to advanced degenerative changes are present throughout the   cervical spine.      Medical Decision Makin. Atherosclerosis of native coronary artery of native heart without angina pectoris  S/p 5 stents, last in . Mild ischemia on two recent nuclear perfusion scans    Currently without recurrent chest pain (had some atypical chest pain ) so will treat medically.  -continue aspirin/lipitor  -fasting blood tests ordered for next labs, lipid panel.     2. 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 Rasheed, now well controlled, last hgbA1c <7    3. Essential hypertension, benign  Now poorly controlled.   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   -Continue losartan 50mg PO bid  -if BP remains >130/85 in 1 week, would start norvasc 5mg PO daily    4. Paroxysmal atrial fibrillation (CMS-HCC)  Self limited and one time in setting of sepsis. Previously discussed at length risks of recurrent CVA given his previous likely embolic event, and if he would like an ILR to confirm diagnosis or to start empiric anticoagulation.  He prefers the later and we started xarelto 2018.  Unfortunately he had melena almost immediately, and was switched back to aspirin/plavix.  He does not want to retrial anticoagulation, or a GI referral as he sees enough physicians already.   -continue aspirin/plavix. No anticoag for now given patient preference despite risk of recurrent CVA.    -continue metoprolol XL 100mg PO Daily, dose  decreased from prior due to fatigue.     5. CKD (chronic kidney disease) stage 3, GFR 30-59 ml/min  Stable.  -avoid spironolactone  -continue losartan  -followed by Dr. Jarvis.     6. Diffuse large cell non-Hodgkin's lymphoma (CMS-HCC)  Followed by Dr. Noel, in remission per report with recent negative PET scan.    7. Left hemiparesis (CMS-HCC)  Acute 12/2016 at [facility].  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  Symptoms originally improved significantly with chemotherapy and he was walking well, and then hemiparesis worsened significantly after sepsis from UTI and norovirus infection mid 2017.  Currently continuing physical therapy, but remains in a wheelchair.     8. Cerebrovascular accident (CVA) due to embolism of cerebral artery (CMS-HCC)  In addition to cerebral enhancing lesions thought to be lymphoma on MRI 12/2016, he was found to have a subacute infarct in the right anterolateral medulla.    -aspirin/plavix for CVA prevention    9. Dyslipidemia  Lipitor, fenofibrate. Recheck lipids with next labs.     10. Peripheral vascular disease (CMS-HCC)  Followed by Dr. Terry previously but now followed by [facility]. Mohsen [facility]. No recent intervention. Contributing to non-healing wounds.   -continue aspirin/plavix     11. Goals of care - this is completed, they have scanned in a card with the number to cloud access to their documents.     This evaluation was conducted via Zoom using secure and encrypted videoconferencing technology. The patient was in a private location in the [facility].    The patient's identity was confirmed and verbal consent was obtained for this virtual visit.      No follow-ups on file.       Osvaldo Tucker MD  [facility]  [facility], [address]  [address] [address]  Phone: [phone]  Fax: [phone]

## 2020-11-17 NOTE — PROCEDURE: BIOPSY BY SHAVE METHOD
Detail Level: Detailed
Depth Of Biopsy: dermis
Was A Bandage Applied: Yes
Size Of Lesion In Cm: 0.6
X Size Of Lesion In Cm: 0
Biopsy Type: H and E
Biopsy Method: double edge Personna blade
Anesthesia Type: 0.5% lidocaine with 1:200,000 epinephrine and a 1:10 solution of 8.4% sodium bicarbonate
Anesthesia Volume In Cc: 0.5
Hemostasis: Aluminum Chloride
Wound Care: Petrolatum
Dressing: bandage
Destruction After The Procedure: No
Type Of Destruction Used: Curettage
Curettage Text: The wound bed was treated with curettage after the biopsy was performed.
Cryotherapy Text: The wound bed was treated with cryotherapy after the biopsy was performed.
Electrodesiccation Text: The wound bed was treated with electrodesiccation after the biopsy was performed.
Electrodesiccation And Curettage Text: The wound bed was treated with electrodesiccation and curettage after the biopsy was performed.
Silver Nitrate Text: The wound bed was treated with silver nitrate after the biopsy was performed.
Lab: [id number]
Lab Facility: 
Consent: Written consent was obtained and risks were reviewed including but not limited to scarring, infection, bleeding, scabbing, incomplete removal, nerve damage and allergy to anesthesia.
Post-Care Instructions: I reviewed with the patient in detail post-care instructions. Keep the biopsy site dry overnight, and then change the dressing once daily and apply a thin layer of petrolatum with a clean q-tip. \\nShowers are OK after 24 hours.  Allow clean water to run over the area.  Do not touch the biopsy site with your hands.  Do not immerse the biopsy site in water such as going into a swimming pool or bathtub until the sutures are removed.  If the biopsy site gets more painful with time, red, or drains, this is concerning for infection.  If you have signs of infection please call the office to come in for a walk in visit Monday through Friday 8:30 am to 12 pm or 1 pm to 4:30 pm.  If we are not in the office, please call through the answering service.
Notification Instructions: Patient will be notified of biopsy results. However, patient instructed to call the office if not contacted within 2 weeks.
Billing Type: Third-Party Bill
Information: Selecting Yes will display possible errors in your note based on the variables you have selected. This validation is only offered as a suggestion for you. PLEASE NOTE THAT THE VALIDATION TEXT WILL BE REMOVED WHEN YOU FINALIZE YOUR NOTE. IF YOU WANT TO FAX A PRELIMINARY NOTE YOU WILL NEED TO TOGGLE THIS TO 'NO' IF YOU DO NOT WANT IT IN YOUR FAXED NOTE.

## 2020-11-23 ENCOUNTER — OFFICE VISIT (OUTPATIENT)
Dept: PHYSICAL MEDICINE AND REHAB | Facility: MEDICAL CENTER | Age: 75
End: 2020-11-23
Payer: MEDICARE

## 2020-11-23 VITALS
DIASTOLIC BLOOD PRESSURE: 68 MMHG | OXYGEN SATURATION: 96 % | TEMPERATURE: 96.8 F | SYSTOLIC BLOOD PRESSURE: 108 MMHG | BODY MASS INDEX: 26.41 KG/M2 | WEIGHT: 195 LBS | HEIGHT: 72 IN | HEART RATE: 76 BPM

## 2020-11-23 DIAGNOSIS — G89.29 CHRONIC RIGHT-SIDED LOW BACK PAIN WITH RIGHT-SIDED SCIATICA: ICD-10-CM

## 2020-11-23 DIAGNOSIS — M54.16 LUMBAR RADICULOPATHY: ICD-10-CM

## 2020-11-23 DIAGNOSIS — Z86.73 HISTORY OF STROKE: ICD-10-CM

## 2020-11-23 DIAGNOSIS — M54.41 CHRONIC RIGHT-SIDED LOW BACK PAIN WITH RIGHT-SIDED SCIATICA: ICD-10-CM

## 2020-11-23 DIAGNOSIS — M54.41 ACUTE RIGHT-SIDED LOW BACK PAIN WITH RIGHT-SIDED SCIATICA: ICD-10-CM

## 2020-11-23 PROCEDURE — 99213 OFFICE O/P EST LOW 20 MIN: CPT | Performed by: PHYSICAL MEDICINE & REHABILITATION

## 2020-11-23 ASSESSMENT — PAIN SCALES - GENERAL: PAINLEVEL: NO PAIN

## 2020-11-23 ASSESSMENT — FIBROSIS 4 INDEX: FIB4 SCORE: 2.73

## 2020-11-23 ASSESSMENT — PATIENT HEALTH QUESTIONNAIRE - PHQ9: CLINICAL INTERPRETATION OF PHQ2 SCORE: 0

## 2020-11-30 DIAGNOSIS — E11.3293 CONTROLLED TYPE 2 DIABETES MELLITUS WITH BOTH EYES AFFECTED BY MILD NONPROLIFERATIVE RETINOPATHY WITHOUT MACULAR EDEMA, WITHOUT LONG-TERM CURRENT USE OF INSULIN (HCC): ICD-10-CM

## 2020-11-30 NOTE — PROGRESS NOTES
Follow up patient note  Interventional spine and sports physiatry, Physical medicine rehabilitation      Chief complaint:   Chief Complaint   Patient presents with   • Follow-Up     Back pain          HISTORY    Please see new patient note by Dr Bennett,  for more details.     HPI  Patient identification: Av Bob ,  1945,   With Diagnoses of Lumbar radiculopathy, Acute right-sided low back pain with right-sided sciatica, Chronic right-sided low back pain with right-sided sciatica, and History of stroke were pertinent to this visit.     Procedures:  Right L3-4 and L4-5 transforaminal epidural steroid injection 95% pain relief and follow-up visit    The patient is very happy after the procedure and has had near complete resolution of his pain.  Now he is having most of the time 0 out of 10 pain and intermittent 1 out of 10 pain in the right low back rarely radiating down the leg.  Functionally he has had a dramatic improvement with improved sitting tolerance as well.  When he does get the pain this is aching and shooting in quality.       ROS Red Flags :   Fever, Chills, Sweats: Denies  Involuntary Weight Loss: Denies  Bowel/Bladder Incontinence: Denies  Saddle Anesthesia: Denies        PMHx:   Past Medical History:   Diagnosis Date   • ([facility]) Chronic ulcer of right lower extremity with fat layer exposed 2018   • Acute on chronic renal failure ([facility]) 2020   • Acute respiratory failure with hypoxia ([facility]) 2017   • CAD (coronary artery disease)     GIOVANNA to RCA in , GIOVANNA X2 to LAD and GIOVANNA X2 to OM in '   • Cancer ([facility])     2017; chemo lympoma   • Cataract    • Cerebrovascular accident (CVA) ([facility]) 2016    Left arm weakness  etiology of stroke not established, lymphoma discovered on MRI evaluation of stroke, L hemiparesis much worse after acute infectious illness in mid 2017, but no specific diagnosed recurrent neurological etiology, all at [facility]  [place]   • Chickenpox    • CKD (chronic kidney disease) stage 3, GFR 30-59 ml/min    • Controlled gout 2014   • Coronary atherosclerosis of native coronary artery     S/P PTCA (percutaneous transluminal coronary angioplasty), RCA, 5/1997, patent on cath 7/10/2009 at the time of interventions on his left anterior descending and circumflex coronary arteries   • Daytime sleepiness    • Depression    • Diabetes ([facility])    • Difficulty swallowing    • Enterococcal septicemia ([facility]) 8/12/2017   • Roberto hematuria 1/29/2020   • Frequent urination    • GERD (gastroesophageal reflux disease)    • [place] measles    • Hypertension    • Hypokalemia 2012    controlled with combination of ACE inhibitor or ARB plus spironolactone   • Hypomagnesemia 08/12/2017    etiology uncertain   • Influenza A 1/26/2020   • Insomnia    • Kidney stone    • Leg edema, right 1/22/2020   • 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 Barry   • Normocytic hypochromic anemia 5/20/2017   • NSTEMI (non-ST elevated myocardial infarction) ([facility]) 07/18/2017    complicating UTI with sepsis   • Pain    • Peripheral vascular disease ([facility])    • Polyneuropathy in diabetes(357.2) 9/11/2013   • Renal mass 1/21/2020   • Septic shock ([facility]) 5/20/2017   • Skin ulcer of calf ([facility]) 2015    Right, Dr. Terry and wound care   • Stroke ([facility]) 2016    left sided weakness   • Urinary bladder disorder    • Urinary incontinence    • Weakness    • Wound of left leg 2012    Requiring surgery and debridment, Dr. Moore       PSHx:   Past Surgical History:   Procedure Laterality Date   • LUMBAR TRANSFORAMINAL EPIDURAL STEROID INJECTION Right 11/2/2020    Procedure: INJECTION, STEROID, SPINE, LUMBAR, EPIDURAL, TRANSFORAMINAL APPROACH;  Surgeon: Harpal Bennett M.D.;  Location: [facility];  Service: Pain Management   • CYSTOSCOPY STENT PLACEMENT Left 2/12/2018     Procedure: CYSTOSCOPY  ;  Surgeon: Rey Barry M.D.;  Location: SURGERY [facility] [place];  Service: Urology   • URETEROSCOPY Left 2/12/2018    Procedure: URETEROSCOPY- FLEXIBLE  ;  Surgeon: Rey Barry M.D.;  Location: SURGERY [facility] [place];  Service: Urology   • LASERTRIPSY Left 2/12/2018    Procedure: LASERTRIPSY - LITHO  ;  Surgeon: Rey Barry M.D.;  Location: SURGERY [facility] [place];  Service: Urology   • WOUND CLOSURE GENERAL  4/3/2012    Performed by GERHARD GILBERT at SURGERY SAME DAY [facility] ORS   • [employer] CARDIAC CATH  2009    Stents to LAD, Om   • DAGOBERTO BY LAPAROSCOPY  1998   • ANGIOPLASTY  1997    RCA followed by other stents as noted above.    • ZZZ CARDIAC CATH  1997    stent RCA   • CATARACT EXTRACTION WITH IOL      bilateral   • LITHOTRIPSY     • TONSILLECTOMY     • TONSILLECTOMY AND ADENOIDECTOMY         Family history   Family History   Problem Relation Age of Onset   • Heart Disease Father         CAD   • Diabetes Father    • Cancer Mother    • Psychiatric Illness Mother         Depression   • Depression Mother    • Kidney stones Brother    • Heart Disease Brother    • Psychiatric Illness Brother         Depression   • Depression Brother    • Suicide Attempts Other    • Psychiatric Illness Other         autism         Medications:   Outpatient Medications Marked as Taking for the 11/23/20 encounter (Office Visit) with Harpal Bennett M.D.   Medication Sig Dispense Refill   • [DISCONTINUED] glucose blood strip 1 Strip by Other route 3 times a day for 90 days. 300 Strip 3   • fenofibrate (TRICOR) 145 MG Tab TAKE 1 TABLET BY MOUTH EVERY DAY 90 Tab 3   • insulin lispro (HUMALOG) 100 UNIT/ML Solution Pen-injector injection PEN INJECT 20 UNITS UNDER THE SKIN AS INSTRUCTED DAILY 96 mL 2   • atorvastatin (LIPITOR) 40 MG Tab Take 1 Tab by mouth every evening. 90 Tab 3   • TOUJEO SOLOSTAR 300 UNIT/ML Solution Pen-injector      • allopurinol (ZYLOPRIM) 100 MG Tab TAKE 1 TABLET BY MOUTH EVERY DAY 90  Tab 0   • clopidogrel (PLAVIX) 75 MG Tab TAKE 1 TABLET BY MOUTH EVERY DAY 90 Tab 1   • Insulin Pen Needle 32 G x 4 mm (BD PEN NEEDLE SWATI U/F) USE FOR INSULIN SHOTS FIVE TIMES DAILY 500 Each 3   • fluoxetine (PROZAC) 40 MG capsule Take 1 Cap by mouth every day. 90 Cap 3   • losartan (COZAAR) 50 MG Tab Take 1 Tab by mouth 2 Times a Day. 180 Tab 3   • TRULICITY 1.5 MG/0.5ML Solution Pen-injector INJECT CONTENTS OF 1 SYRINGE SUBCUTANEOUSLY EVERY 7 DAYS ON TUESDAY 6 mL 1   • metoprolol SR (TOPROL XL) 100 MG TABLET SR 24 HR Take 1 Tab by mouth every day. 90 Tab 3   • amLODIPine (NORVASC) 5 MG Tab Take 1 Tab by mouth every day. 90 Tab 3   • Multiple Vitamin (MULTI VITAMIN MENS PO) Take  by mouth.     • potassium chloride (KLOR-CON) 8 MEQ tablet TAKE 1 TABLET BY MOUTH EVERY DAY 90 Tab 3   • aspirin EC (ECOTRIN) 81 MG Tablet Delayed Response Take 81 mg by mouth every day.     • Probiotic Product (PROBIOTIC DAILY PO) Take 1 Cap by mouth 2 Times a Day.     • magnesium oxide (MAG-OX) 400 MG Tab Take 400 mg by mouth every day.     • finasteride (PROSCAR) 5 MG Tab Take 1 Tab by mouth every day. 30 Tab 0   • alfuzosin (UROXATRAL) 10 MG SR tablet Take 10 mg by mouth every day.     • methenamine hip (HIPPREX) 1 GM Tab Take 1 g by mouth 2 times a day.     • Insulin Glargine (TOUJEO MAX SOLOSTAR) 300 UNIT/ML Solution Pen-injector Inject 28 Units as instructed every bedtime.     • acetaminophen (TYLENOL) 500 MG Tab Take 1,000 mg by mouth every 6 hours as needed for Mild Pain or Moderate Pain.     • Cholecalciferol (VITAMIN D) 2000 units Cap Take 4,000 Units by mouth 2 Times a Day. Once a day          Current Outpatient Medications on File Prior to Visit   Medication Sig Dispense Refill   • fenofibrate (TRICOR) 145 MG Tab TAKE 1 TABLET BY MOUTH EVERY DAY 90 Tab 3   • insulin lispro (HUMALOG) 100 UNIT/ML Solution Pen-injector injection PEN INJECT 20 UNITS UNDER THE SKIN AS INSTRUCTED DAILY 96 mL 2   • atorvastatin (LIPITOR) 40 MG Tab  Take 1 Tab by mouth every evening. 90 Tab 3   • TOUJEO SOLOSTAR 300 UNIT/ML Solution Pen-injector      • allopurinol (ZYLOPRIM) 100 MG Tab TAKE 1 TABLET BY MOUTH EVERY DAY 90 Tab 0   • clopidogrel (PLAVIX) 75 MG Tab TAKE 1 TABLET BY MOUTH EVERY DAY 90 Tab 1   • Insulin Pen Needle 32 G x 4 mm (BD PEN NEEDLE SWATI U/F) USE FOR INSULIN SHOTS FIVE TIMES DAILY 500 Each 3   • fluoxetine (PROZAC) 40 MG capsule Take 1 Cap by mouth every day. 90 Cap 3   • losartan (COZAAR) 50 MG Tab Take 1 Tab by mouth 2 Times a Day. 180 Tab 3   • TRULICITY 1.5 MG/0.5ML Solution Pen-injector INJECT CONTENTS OF 1 SYRINGE SUBCUTANEOUSLY EVERY 7 DAYS ON TUESDAY 6 mL 1   • metoprolol SR (TOPROL XL) 100 MG TABLET SR 24 HR Take 1 Tab by mouth every day. 90 Tab 3   • amLODIPine (NORVASC) 5 MG Tab Take 1 Tab by mouth every day. 90 Tab 3   • Multiple Vitamin (MULTI VITAMIN MENS PO) Take  by mouth.     • potassium chloride (KLOR-CON) 8 MEQ tablet TAKE 1 TABLET BY MOUTH EVERY DAY 90 Tab 3   • aspirin EC (ECOTRIN) 81 MG Tablet Delayed Response Take 81 mg by mouth every day.     • Probiotic Product (PROBIOTIC DAILY PO) Take 1 Cap by mouth 2 Times a Day.     • magnesium oxide (MAG-OX) 400 MG Tab Take 400 mg by mouth every day.     • finasteride (PROSCAR) 5 MG Tab Take 1 Tab by mouth every day. 30 Tab 0   • alfuzosin (UROXATRAL) 10 MG SR tablet Take 10 mg by mouth every day.     • methenamine hip (HIPPREX) 1 GM Tab Take 1 g by mouth 2 times a day.     • Insulin Glargine (TOUJEO MAX SOLOSTAR) 300 UNIT/ML Solution Pen-injector Inject 28 Units as instructed every bedtime.     • acetaminophen (TYLENOL) 500 MG Tab Take 1,000 mg by mouth every 6 hours as needed for Mild Pain or Moderate Pain.     • Cholecalciferol (VITAMIN D) 2000 units Cap Take 4,000 Units by mouth 2 Times a Day. Once a day       No current facility-administered medications on file prior to visit.          Allergies:   Allergies   Allergen Reactions   • Diphenhydramine Hcl Anxiety     Pt is able  to tolerate  Mg benadryl with less anxiety   • Lorazepam Unspecified     Disorientation   • Ciprofloxacin      Rash,stomach ache   • Spironolactone      Acute kidney injury       Social Hx:   Social History     Socioeconomic History   • Marital status:      Spouse name: Not on file   • Number of children: Not on file   • Years of education: Not on file   • Highest education level: Not on file   Occupational History   • Not on file   Social Needs   • Financial resource strain: Not on file   • Food insecurity     Worry: Not on file     Inability: Not on file   • Transportation needs     Medical: Not on file     Non-medical: Not on file   Tobacco Use   • Smoking status: Never Smoker   • Smokeless tobacco: Never Used   • Tobacco comment: continued abstinence   Substance and Sexual Activity   • Alcohol use: Never   • Drug use: Never   • Sexual activity: Not Currently     Partners: Female     Comment: , one daughter, 2 grands   Lifestyle   • Physical activity     Days per week: Not on file     Minutes per session: Not on file   • Stress: Not on file   Relationships   • Social connections     Talks on phone: Not on file     Gets together: Not on file     Attends [employer] service: Not on file     Active member of club or organization: Not on file     Attends meetings of clubs or organizations: Not on file     Relationship status: Not on file   • Intimate partner violence     Fear of current or ex partner: Not on file     Emotionally abused: Not on file     Physically abused: Not on file     Forced sexual activity: Not on file   Other Topics Concern   •  Service Yes   • Blood Transfusions No   • Caffeine Concern No   • Occupational Exposure No   • Hobby Hazards No   • Sleep Concern Yes   • Stress Concern No   • Weight Concern No   • Special Diet No   • Back Care No   • Exercise Yes   • Bike Helmet No   • Seat Belt Yes   • Self-Exams Yes   Social History Narrative    Av is from [place], [place] and  raised in [place]. He has been in [place] since 2004. 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  He has been  to Usha since 2003, they met through a mutual friend. He has a daughter (Kalina) and a step son (Jimi).         EXAMINATION     Physical Exam:   Vitals: /68 (BP Location: Right arm, Patient Position: Sitting, BP Cuff Size: Adult)   Pulse 76   Temp 36 °C (96.8 °F) (Temporal)   Ht 1.829 m (6')   Wt 88.5 kg (195 lb)   SpO2 96%     Constitutional:   Body Habitus: Body mass index is 26.45 kg/m².  Cooperation: Fully cooperates with exam  Appearance: Well-groomed no disheveled    Respiratory-  breathing comfortable on room air, no audible wheezing  Cardiovascular- capillary refills less than 2 seconds. No lower extremity edema is noted.   Psychiatric- alert and oriented ×3. Normal affect.    MSK and Neuro: -      Motor Exam Lower Extremities     ? Myotome R L   Hip flexion L2 5 4-   Knee extension L3 5 4-   Ankle dorsiflexion L4 5 4-   Toe extension L5 5 4-   Ankle plantarflexion S1 5 4-                MEDICAL DECISION MAKING    DATA    Labs:   Lab Results   Component Value Date/Time    SODIUM 138 08/17/2020 02:29 PM    POTASSIUM 3.7 08/17/2020 02:29 PM    CHLORIDE 103 08/17/2020 02:29 PM    CO2 22 08/17/2020 02:29 PM    GLUCOSE 91 08/17/2020 02:29 PM    BUN 31 (H) 08/17/2020 02:29 PM    CREATININE 1.44 (H) 08/17/2020 02:29 PM    CREATININE 1.4 05/28/2008 05:42 PM    BUNCREATRAT 10 03/27/2017 02:11 PM        Lab Results   Component Value Date/Time    PROTHROMBTM 15.3 (H) 01/22/2020 12:35 PM    INR 1.19 (H) 01/22/2020 12:35 PM        Lab Results   Component Value Date/Time    WBC 9.5 08/17/2020 02:29 PM    RBC 4.19 (L) 08/17/2020 02:29 PM    HEMOGLOBIN 12.0 (L)  08/17/2020 02:29 PM    HEMATOCRIT 37.1 (L) 08/17/2020 02:29 PM    MCV 88.5 08/17/2020 02:29 PM    MCH 28.6 08/17/2020 02:29 PM    MCHC 32.3 (L) 08/17/2020 02:29 PM    MPV 10.2 08/17/2020 02:29 PM    NEUTSPOLYS 91.70 (H) 01/26/2020 04:39 PM    LYMPHOCYTES 1.60 (L) 01/26/2020 04:39 PM    MONOCYTES 4.30 01/26/2020 04:39 PM    EOSINOPHILS 1.10 01/26/2020 04:39 PM    BASOPHILS 0.20 01/26/2020 04:39 PM    HYPOCHROMIA 1+ 03/21/2012 01:08 PM        Lab Results   Component Value Date/Time    HBA1C 5.8 (H) 06/19/2020 08:14 AM          Imaging:   I personally reviewed following images    I reviewed the fluoroscopic images from 11/2/2020 showing successful right L3-4 and L4-5 transforaminal epidural steroid injection.  I reviewed these with the patient at the visit on 11/23/2020.    I reviewed the following radiology reports                                Results for orders placed during the hospital encounter of 09/24/20   MR-LUMBAR SPINE-W/O    Impression 1.  Multifocal degenerative disease in the lumbar spine as described above.  2.  Bilateral hydronephrosis and hydroureter. This is noted on the previous CT scan dated 1/21/2020.  3.  There is small focus of sclerosis in the L4 vertebral body. This is new since the previous study. This finding likely secondary to the degeneration. However if there is a history of carcinoma the possibility of sclerotic metastasis cannot be   excluded.                                                                  Results for orders placed in visit on 07/23/20   DX-CHEST-2 VIEWS    Impression No acute cardiopulmonary disease.                                                                                   DIAGNOSIS   Visit Diagnoses     ICD-10-CM   1. Lumbar radiculopathy  M54.16   2. Acute right-sided low back pain with right-sided sciatica  M54.41   3. Chronic right-sided low back pain with right-sided sciatica  M54.41    G89.29   4. History of stroke  Z86.73         ASSESSMENT and PLAN:      Av Allen Lisbeth  1945 male      Av was seen today for follow-up.    Diagnoses and all orders for this visit:    Lumbar radiculopathy    Acute right-sided low back pain with right-sided sciatica    Chronic right-sided low back pain with right-sided sciatica    History of stroke          The patient is doing quite well from a pain and functional standpoint after the epidural steroid injection he is very happy.  I also discussed the case with the patient's wife who assisted with the HPI.  We discussed additions to the patient's home exercise program.    Total time: 18 minutes face-to-face time. I spent greater than 50% of the time for patient care and coordination on this date, including with the patient as per assessment and plan above.        Follow up: 6 to 12 months as needed    Thank you for allowing me to participate in the care of this patient. If you have any questions please not hesitate to contact me.             Please note that this dictation was created using voice recognition software. I have made every reasonable attempt to correct obvious errors but there may be errors of grammar and content that I may have overlooked prior to finalization of this note.      Harpal Bennett MD  Interventional Spine and Sports Physiatry  Physical Medicine and Rehabilitation  [facility]

## 2020-12-02 DIAGNOSIS — E11.3293 CONTROLLED TYPE 2 DIABETES MELLITUS WITH BOTH EYES AFFECTED BY MILD NONPROLIFERATIVE RETINOPATHY WITHOUT MACULAR EDEMA, WITHOUT LONG-TERM CURRENT USE OF INSULIN (HCC): ICD-10-CM

## 2020-12-02 RX ORDER — DULAGLUTIDE 1.5 MG/.5ML
0.5 INJECTION, SOLUTION SUBCUTANEOUS
Qty: 6 ML | Refills: 1 | Status: SHIPPED | OUTPATIENT
Start: 2020-12-02 | End: 2021-03-15

## 2020-12-05 ENCOUNTER — SLEEP STUDY (OUTPATIENT)
Dept: SLEEP MEDICINE | Facility: MEDICAL CENTER | Age: 75
End: 2020-12-05
Payer: MEDICARE

## 2020-12-05 DIAGNOSIS — G47.33 OSA (OBSTRUCTIVE SLEEP APNEA): ICD-10-CM

## 2020-12-05 PROCEDURE — 95811 POLYSOM 6/>YRS CPAP 4/> PARM: CPT | Performed by: INTERNAL MEDICINE

## 2020-12-07 ENCOUNTER — OFFICE VISIT (OUTPATIENT)
Dept: MEDICAL GROUP | Facility: MEDICAL CENTER | Age: 75
End: 2020-12-07
Payer: MEDICARE

## 2020-12-07 VITALS
OXYGEN SATURATION: 98 % | WEIGHT: 195 LBS | BODY MASS INDEX: 26.41 KG/M2 | SYSTOLIC BLOOD PRESSURE: 118 MMHG | DIASTOLIC BLOOD PRESSURE: 80 MMHG | TEMPERATURE: 97.9 F | HEIGHT: 72 IN | HEART RATE: 64 BPM

## 2020-12-07 DIAGNOSIS — Z79.4 TYPE 2 DIABETES MELLITUS WITH OTHER SPECIFIED COMPLICATION, WITH LONG-TERM CURRENT USE OF INSULIN (HCC): ICD-10-CM

## 2020-12-07 DIAGNOSIS — Z11.59 ENCOUNTER FOR HEPATITIS C SCREENING TEST FOR LOW RISK PATIENT: ICD-10-CM

## 2020-12-07 DIAGNOSIS — N18.31 STAGE 3A CHRONIC KIDNEY DISEASE: ICD-10-CM

## 2020-12-07 DIAGNOSIS — D50.9 IRON DEFICIENCY ANEMIA, UNSPECIFIED IRON DEFICIENCY ANEMIA TYPE: ICD-10-CM

## 2020-12-07 DIAGNOSIS — E78.5 HYPERLIPIDEMIA ASSOCIATED WITH TYPE 2 DIABETES MELLITUS (HCC): ICD-10-CM

## 2020-12-07 DIAGNOSIS — Z00.00 ENCOUNTER FOR SUBSEQUENT ANNUAL WELLNESS VISIT (AWV) IN MEDICARE PATIENT: Primary | ICD-10-CM

## 2020-12-07 DIAGNOSIS — E11.69 HYPERLIPIDEMIA ASSOCIATED WITH TYPE 2 DIABETES MELLITUS (HCC): ICD-10-CM

## 2020-12-07 DIAGNOSIS — E11.69 TYPE 2 DIABETES MELLITUS WITH OTHER SPECIFIED COMPLICATION, WITH LONG-TERM CURRENT USE OF INSULIN (HCC): ICD-10-CM

## 2020-12-07 PROCEDURE — G0439 PPPS, SUBSEQ VISIT: HCPCS | Performed by: INTERNAL MEDICINE

## 2020-12-07 RX ORDER — ASCORBIC ACID 500 MG
500 TABLET ORAL DAILY
COMMUNITY
End: 2021-03-02

## 2020-12-07 RX ORDER — ZOLPIDEM TARTRATE 5 MG/1
TABLET ORAL
COMMUNITY
End: 2021-02-23

## 2020-12-07 ASSESSMENT — ACTIVITIES OF DAILY LIVING (ADL): BATHING_REQUIRES_ASSISTANCE: 1

## 2020-12-07 ASSESSMENT — ENCOUNTER SYMPTOMS: GENERAL WELL-BEING: FAIR

## 2020-12-07 ASSESSMENT — PATIENT HEALTH QUESTIONNAIRE - PHQ9: CLINICAL INTERPRETATION OF PHQ2 SCORE: 0

## 2020-12-07 ASSESSMENT — FIBROSIS 4 INDEX: FIB4 SCORE: 2.73

## 2020-12-07 NOTE — PROCEDURES
Comments:  The patient underwent a CPAP titration using the standard montage for measurement of parameters of sleep, respiratory events, movement abnormalities, and heart rate and rhythm.   A microphone was used to monitor snoring.  Interpretation:  Study start time was 10:19:34 PM. Diagnostic recording time was 7h 35.5m with a total sleep time of 4h 22.0m resulting in a sleep efficiency of 57.52%%.   Sleep latency from the start of the study was 24 minutes and the latency from sleep to REM was 340 minutes.  In total,151 arousals were scored for an arousal index of 34.6.  Respiratory:  There were a total of 27 apneas consisting of 19 obstructive apneas, 0 mixed apneas, and 8 central apneas. A total of 89 hypopneas were scored.  The apnea index was 6.18 per hour and the hypopnea index was 20.38 per hour resulting in an overall AHI of 26.56.  AHI during rem was 0.0 and AHI while supine was 50.75.  Oximetry:  There was a mean oxygen saturation of 95.0% with a minimum oxygen saturation of 82.0%. Time spent with oxygen saturations below 89% was 5.8 minutes.  Cardiac:  The highest heart rate seen while awake was 114 BPM while the highest heart rate during sleep was 98 BPM with an average sleeping heart rate of 68 BPM.  Limb Movements:  There were a total of 165 PLMs during sleep, of which 44 were PLMS arousals. This resulted in a PLMS index of 37.8 and a PLMS arousal index of 10.1.    CPAP was tried from 5 to 11cm H2O. BiPAP was tried from 8/4 to 12/8cm H2O.    RECORDING TECHNIQUE:       After the scalp was prepared, gold plated electrodes were applied to the scalp according to the International 10-20 System. EEG (electroencephalogram) was continuously monitored from the O1-M2, O2-M1, C3-M2, C4-M1, F3-M2, and F4-M1.   EOGs (electrooculograms) were monitored by electrodes placed at the left and right outer canthi.  Chin EMG (electromyogram) was monitored by electrodes placed on the mentalis and sub-mentalis muscles.  Nasal  and oral airflow were monitored using a triple port thermocouple as well as oronasal pressure transducer.  Respiratory effort was measured by inductive plethysmography technology employing abdominal and thoracic belts.  Blood oxygen saturation and pulse were monitored by pulse oximetry.  Heart rhythm was monitored by surface electrocardiogram.  Leg EMG was studied using surface electrodes placed on left and right anterior tibialis.  A microphone was used to monitor tracheal sounds and snoring.  Body position was monitored and documented by technician observation      SUMMARY:    This was an overnight positive airway pressure titration polysomnogram.  The patient chose to use a medium dream wisp mask and heated humidification.     The total recording time was 455 minutes, the sleep period time was *431 minutes, and the total sleep time was 262 minutes.  The patient's sleep efficiency was 57.5 to % which is reduced.  The patient experienced 1 REM period(s).    The sleep stage durations revealed 169.0 minutes of wake after sleep onset (WASO), 76.1 minutes of N1 sleep, 151 minutes of N2 sleep, 0 minutes of N3 sleep, and 34.5 minutes of REM.    The latency to sleep was 24 minutes which is prolonged.  The latency to REM was 5 hours 40 minutes which is prolonged.  Severe sleep fragmentation occurred.  The arousal index was 34.6.  The Limb Movement with Arousal Index was 10.3, the Total Limb Movement (isolated) Index was 14.9, and the PLM Series Index was 34.4.    The patient experienced 8 central and 19 obstructive apneas, 1 central and 88 obstructive hypopneas, 116 apneas and hypopneas, and 15 RERAS.  The apnea hypopnea index was 26.6, the RDI was 30.0, the mean event duration was 15.2 seconds, and the longest event lasted 27.5 seconds.  The REM index was 0 and the supine index was 50.7.  The apnea hypopnea index represents moderate sleep apnea hypopnea syndrome during the PAP titration.    The bartolo saturation during  sleep was 83% and the patient spent 2.9% of the recording with saturations less than or equal to 90%.    During sleep, the minimum heart rate was *60 bpm, the mean heart rate was 68 bpm, and the maximum heart rate was 98 bpm.    The technician initiated treatment with CPAP 5 cmH2O and increased the pressure to a maximum of 11 cmH2O. the apnea-hypopnea index failed to normalize on any tested CPAP pressure therefore the technician tried the patient on bilevel with IPAP's ranging from 8-12 cm water and EPAP's ranging from 4-8 cm water.    The patient did best on CPAP 12/8 cm water with a resultant AHI of 0-1.3, a minimum saturation of 89%, and a mean saturation of 94-96%.  The titration with CPAP at 12 cm water included nonsupine REM sleep        ASSESSMENT:    Satisfactory bilevel titration to a best pressure of 12/8 cm water with a resultant normalized AHI, a minimum saturation of 89%, a mean saturation of 94-96%, and the achievement of nonsupine REM sleep.        RECOMMENDATION:    Recommend bilevel 12/8 cmH2O using medium dream wisp mask and heated humidification followed by data card and clinical review in 6-8 weeks.

## 2020-12-07 NOTE — PROGRESS NOTES
Chief Complaint   Patient presents with   • Annual Wellness Visit         HPI:  Av is a 75 y.o. here for Medicare Annual Wellness Visit    Problem   (HCC) Hyperlipidemia associated with type 2 diabetes mellitus       Ref. Range 10/17/2019  5/7/2020   Cholesterol,Tot Latest Ref Range: 100 - 199 mg/dL 160 120   Triglycerides Latest Ref Range: 0 - 149 mg/dL 114 84   HDL Latest Ref Range: >=40 mg/dL 33 (A) 30   LDL Latest Ref Range: <100 mg/dL 104 (H) 73     History of stroke, CAD, DM2, and  hyperlipidemia. Currently on high dose atorvastatin 40 mg daily and fenofibrate 145 mg daily. Follows with cardiology, Dr. Tucker.      Iron Deficiency Anemia       Ref. Range 1/26/2020 16:39 5/7/2020 12:13 8/17/2020 14:29   Hemoglobin Latest Ref Range: 14.0 - 18.0 g/dL 10.3 (L) 13.3 (L) 12.0 (L)   Hematocrit Latest Ref Range: 42.0 - 52.0 % 30.7 (L) 41.6 (L) 37.1 (L)   MCV Latest Ref Range: 81.4 - 97.8 fL 86.0 86.8 88.5     He has had anemia for the past few years.  Hemoglobin ranges from mid nines to mid 13's.  No active bleeding at this time.  He is on Plavix for history of stroke.  He also has chronic kidney disease which likely worsens baseline anemia.     Type 2 Diabetes Mellitus, With Long-Term Current Use of Insulin (Hcc)       Ref. Range 2/4/2020 11:45 6/19/2020 08:14   Glycohemoglobin Latest Ref Range: 0.0 - 5.6 % 5.4 5.8 (H)   Estim. Avg Glu Latest Units: mg/dL  120       Longstanding history of type 2 diabetes on insulin.  Previously followed with endocrinology, Dr. Rasheed.     Current regimen includes Trulicity 1.5 mg weekly, Toujeo 28 units at bedtime, and Humalog 5 to 7 units 3 times daily before meals.    Since reducing Toujeo he has noticed that he has had to increase the amount of Humalog.  No recent hypoglycemia.  Averages 100-20 fasting and up to low 200s at mealtime.         Stage 3 Chronic Kidney Disease       Ref. Range 1/23/2020 03:38 1/24/2020 04:21 1/26/2020 16:39 2/3/2020 14:16 5/7/2020 12:13  8/17/2020 14:29   Bun Latest Ref Range: 8 - 22 mg/dL 30 (H) 27 (H) 18 20 22 31 (H)   Creatinine Latest Ref Range: 0.50 - 1.40 mg/dL 1.68 (H) 1.50 (H) 1.42 (H) 1.43 (H) 1.45 (H) 1.44 (H)   GFR If Non  Latest Ref Range: >60 mL/min/1.73 m 2 40 (A) 46 (A) 49 (A) 48 (A) 48 (A) 48 (A)     He has a history of chronic kidney disease related to nephrolithiasis, recurrent UTIs, and BPH as well as history of hypertension and diabetes.  He is following with nephrology, Dr. Jarvis.     Spironolactone was discontinued due to worsening chronic kidney disease.    Other current renally excreted medications include losartan 50 mg daily, potassium chloride 8 mEq daily, and Toujeo/Humalog.    He had recent follow-up with his nephrologist who is very happy with the stability of his renal function.         Patient Active Problem List    Diagnosis Date Noted   • Hydronephrosis 01/20/2020     Priority: Medium   • Paroxysmal atrial fibrillation (HCC) 05/23/2017     Priority: Medium   • (HCC) Hypertension associated with diabetes 03/22/2017     Priority: Medium   • H/O Cerebrovascular accident (CVA) in adulthood 12/30/2016     Priority: Medium   • Coronary artery disease involving native coronary artery 12/30/2016     Priority: Medium   • (HCC) Hyperlipidemia associated with type 2 diabetes mellitus 12/13/2011     Priority: Medium   • GERD with esophagitis 10/19/2018     Priority: Low   • Recurrent UTI 04/26/2018     Priority: Low   • (HCC) Left hemiparesis 12/27/2017     Priority: Low   • (HCC) Diabetic nephropathy associated with type 2 diabetes mellitus 11/30/2017     Priority: Low   • Psychophysiological insomnia 11/21/2017     Priority: Low   • (HCC) Moderate episode of recurrent major depressive disorder 11/07/2017     Priority: Low   • Wheelchair dependent 11/07/2017     Priority: Low   • Iron deficiency anemia 05/20/2017     Priority: Low   • H/O non-Hodgkin's lymphoma 05/08/2017     Priority: Low   • Type 2 diabetes  mellitus, with long-term current use of insulin ([facility]) 12/30/2016     Priority: Low   • Stage 3 chronic kidney disease 08/25/2016     Priority: Low   • Idiopathic chronic gout of multiple sites without tophus 01/28/2014     Priority: Low   • Right posterior hip pain and right leg cramping 09/09/2020   • ASHLEY (obstructive sleep apnea) 08/27/2020   • Low testosterone in male 07/06/2020   • Nocturnal hypoxemia 07/06/2020   • Chronic fatigue 06/09/2020   • Essential hypertension, benign 05/06/2020   • Polypharmacy 02/04/2020   • Renal cyst 01/29/2020   • Benign prostatic hyperplasia with urinary obstruction 01/29/2020   • Altered mobility due to old stroke 01/22/2020   • Normocytic anemia 01/21/2020   • History of renal calculi 11/19/2019   • Neurogenic bladder 11/19/2019   • PVD (peripheral vascular disease) ([facility]) 10/08/2019   • Strain of flexor muscle of right hip 05/20/2019   • Muscle strain of right gluteal region 05/20/2019   • Left hand weakness 05/20/2019   • Urinary incontinence 02/19/2019   • ([facility]) Tibial artery disease 12/27/2018       Current Outpatient Medications   Medication Sig Dispense Refill   • glucose blood strip OneTouch Ultra Blue Test Strip   U TO TEST TID     • Dulaglutide (TRULICITY) 1.5 MG/0.5ML Solution Pen-injector Inject 0.5 mL under the skin every 7 days. 6 mL 1   • glucose blood strip 1 Strip by Other route 3 times a day for 90 days. 600 Strip 0   • fenofibrate (TRICOR) 145 MG Tab TAKE 1 TABLET BY MOUTH EVERY DAY 90 Tab 3   • insulin lispro (HUMALOG) 100 UNIT/ML Solution Pen-injector injection PEN INJECT 20 UNITS UNDER THE SKIN AS INSTRUCTED DAILY 96 mL 2   • atorvastatin (LIPITOR) 40 MG Tab Take 1 Tab by mouth every evening. 90 Tab 3   • TOUJEO SOLOSTAR 300 UNIT/ML Solution Pen-injector      • allopurinol (ZYLOPRIM) 100 MG Tab TAKE 1 TABLET BY MOUTH EVERY DAY 90 Tab 0   • clopidogrel (PLAVIX) 75 MG Tab TAKE 1 TABLET BY MOUTH EVERY DAY 90 Tab 1   • Insulin Pen Needle 32 G x 4 mm (BD PEN  NEEDLE SWATI U/F) USE FOR INSULIN SHOTS FIVE TIMES DAILY 500 Each 3   • fluoxetine (PROZAC) 40 MG capsule Take 1 Cap by mouth every day. 90 Cap 3   • losartan (COZAAR) 50 MG Tab Take 1 Tab by mouth 2 Times a Day. 180 Tab 3   • metoprolol SR (TOPROL XL) 100 MG TABLET SR 24 HR Take 1 Tab by mouth every day. 90 Tab 3   • amLODIPine (NORVASC) 5 MG Tab Take 1 Tab by mouth every day. 90 Tab 3   • Multiple Vitamin (MULTI VITAMIN MENS PO) Take  by mouth.     • potassium chloride (KLOR-CON) 8 MEQ tablet TAKE 1 TABLET BY MOUTH EVERY DAY 90 Tab 3   • aspirin EC (ECOTRIN) 81 MG Tablet Delayed Response Take 81 mg by mouth every day.     • Probiotic Product (PROBIOTIC DAILY PO) Take 1 Cap by mouth 2 Times a Day.     • magnesium oxide (MAG-OX) 400 MG Tab Take 400 mg by mouth every day.     • finasteride (PROSCAR) 5 MG Tab Take 1 Tab by mouth every day. 30 Tab 0   • alfuzosin (UROXATRAL) 10 MG SR tablet Take 10 mg by mouth every day.     • methenamine hip (HIPPREX) 1 GM Tab Take 1 g by mouth 2 times a day.     • Insulin Glargine (TOUJEO MAX SOLOSTAR) 300 UNIT/ML Solution Pen-injector Inject 28 Units as instructed every bedtime.     • acetaminophen (TYLENOL) 500 MG Tab Take 1,000 mg by mouth every 6 hours as needed for Mild Pain or Moderate Pain.     • Cholecalciferol (VITAMIN D) 2000 units Cap Take 4,000 Units by mouth 2 Times a Day. Once a day     • ascorbic acid (VITAMIN C) 500 MG tablet Take 500 mg by mouth every day.     • zolpidem (AMBIEN) 5 MG Tab zolpidem 5 mg tablet   TK 1 T PO HS PRN SLEEP FOR 1 DAY.  TAKE ON THE NIGHT OF SLEEP STUDY MAY REPEAT ONCE AT BEDTIME. BRING TO SLEEP STUDY       No current facility-administered medications for this visit.         Patient is taking medications as noted in medication list.  Current supplements as per medication list.     Allergies: Diphenhydramine hcl, Lorazepam, Ciprofloxacin, and Spironolactone    Current social contact/activities: Pt reports watching [employer] channel and the  [employer]ers.     Is patient current with immunizations? No, due for SHINGRIX (Shingles). Patient is interested in receiving NONE today.    He  reports that he has never smoked. He has never used smokeless tobacco. He reports that he does not drink alcohol or use drugs.  Counseling given: Not Answered  Comment: continued abstinence        DPA/Advanced directive: Patient has Advanced Directive on file.     ROS:    Gait: Uses a wheelchair   Ostomy: No   Other tubes: No   Amputations: No   Chronic oxygen use No   Last eye exam Pt reports last week   Wears hearing aids: No   : Denies any urinary leakage during the last 6 months      Depression Screening    Little interest or pleasure in doing things?  0 - not at all  Feeling down, depressed, or hopeless? 0 - not at all  Patient Health Questionnaire Score: 0    If depressive symptoms identified deferred to follow up visit unless specifically addressed in assessment and plan.    Interpretation of PHQ-9 Total Score   Score Severity   1-4 No Depression   5-9 Mild Depression   10-14 Moderate Depression   15-19 Moderately Severe Depression   20-27 Severe Depression    Screening for Cognitive Impairment    Three Minute Recall (river, nation, finger)  1/3 Missed river and finger  Segun clock face with all 12 numbers and set the hands to show 10 past 11.  No 1/5  If cognitive concerns identified, deferred for follow up unless specifically addressed in assessment and plan.    Fall Risk Assessment    Has the patient had two or more falls in the last year or any fall with injury in the last year?  No  If fall risk identified, deferred for follow up unless specifically addressed in assessment and plan.    Safety Assessment    Throw rugs on floor.  No  Handrails on all stairs.  Yes  Good lighting in all hallways.  Yes  Difficulty hearing.  No  Patient counseled about all safety risks that were identified.    Functional Assessment ADLs    Are there any barriers preventing you from cooking  for yourself or meeting nutritional needs?  Yes. Spouse does all the cooking   Are there any barriers preventing you from driving safely or obtaining transportation?  Yes. Spouse does all the driving   Are there any barriers preventing you from using a telephone or calling for help?  No.    Are there any barriers preventing you from shopping?  Yes.    Are there any barriers preventing you from taking care of your own finances?  Yes.    Are there any barriers preventing you from managing your medications?  Yes.    Are there any barriers preventing you from showering, bathing or dressing yourself?  Yes.    Are you currently engaging in any exercise or physical activity?  Yes.  Pt reports he see an exercise  2x week   What is your perception of your health?  Fair.    Health Maintenance Summary                HEPATITIS C SCREENING Overdue 1945     IMM ZOSTER VACCINES Overdue 11/23/2015      Done 9/28/2015 Imm Admin: Zoster Vaccine Live (ZVL) (Zostavax) - HISTORICAL DATA    Annual Wellness Visit Overdue 3/27/2019      Done 3/26/2018 Visit Dx: Medicare annual wellness visit, subsequent     Patient has more history with this topic...    RETINAL SCREENING Overdue 12/7/2020      Done 10/7/2020 REFERRAL FOR RETINAL SCREENING EXAM     Patient has more history with this topic...    A1C SCREENING Next Due 12/19/2020      Done 12/31/2016 Ext Proc: HEMOGLOBIN A1C     Patient has more history with this topic...    URINE ACR / MICROALBUMIN Next Due 2/3/2021      Done 2/3/2020 MICROALBUMIN CREAT RATIO URINE     Patient has more history with this topic...    FASTING LIPID PROFILE Next Due 5/7/2021      Done 12/30/2016 Ext Proc: LIPID PROFILE     Patient has more history with this topic...    SERUM CREATININE Next Due 8/17/2021      Done 1/2/2017 Ext Proc: BASIC METABOLIC PANEL     Patient has more history with this topic...    DIABETES MONOFILAMENT / LE EXAM Next Due 12/7/2021      Done 12/7/2020 AMB DIABETIC MONOFILAMENT  LOWER EXTREMITY EXAM     Patient has more history with this topic...    COLOGUARD STOOL DNA Next Due 4/18/2022      Done 4/18/2019 COLOGUARD COLON CANCER SCREENING     Patient has more history with this topic...    IMM DTaP/Tdap/Td Vaccine Next Due 1/13/2024      Done 1/13/2014 Imm Admin: Tdap Vaccine          Patient Care Team:  Madison Bunch D.O. as PCP - General (Internal Medicine)  Trinidad Maguire M.D. as Consulting Physician (Endocrinology)  Iron Cox M.D. as Consulting Physician (Ophthalmology)  Rey Barry M.D. as Consulting Physician (Urology)  Ellie Noel M.D. as Consulting Physician (Oncology)  Osvaldo Tucker M.D. as Consulting Physician (Cardiology)  Harpal Bennett M.D. (Phys Med and Rehab)  Lorie Huff D.O. (Phys Med and Rehab)    Social History     Tobacco Use   • Smoking status: Never Smoker   • Smokeless tobacco: Never Used   • Tobacco comment: continued abstinence   Substance Use Topics   • Alcohol use: Never   • Drug use: Never     Family History   Problem Relation Age of Onset   • Heart Disease Father         CAD   • Diabetes Father    • Cancer Mother    • Psychiatric Illness Mother         Depression   • Depression Mother    • Kidney stones Brother    • Heart Disease Brother    • Psychiatric Illness Brother         Depression   • Depression Brother    • Suicide Attempts Other    • Psychiatric Illness Other         autism     He  has a past medical history of (HCC) Chronic ulcer of right lower extremity with fat layer exposed (12/27/2018), Acute on chronic renal failure ([facility]) (1/21/2020), Acute respiratory failure with hypoxia ([facility]) (5/20/2017), CAD (coronary artery disease), Cancer (HCC), Cataract, Cerebrovascular accident (CVA) ([facility]) (12/30/2016), Chickenpox, CKD (chronic kidney disease) stage 3, GFR 30-59 ml/min, Controlled gout (2014), Coronary atherosclerosis of native coronary artery, Daytime sleepiness, Depression, Diabetes (HCC), Difficulty swallowing,  Enterococcal septicemia ([facility]) (8/12/2017), Roberto hematuria (1/29/2020), Frequent urination, GERD (gastroesophageal reflux disease), Czech measles, Hypertension, Hypokalemia (2012), Hypomagnesemia (08/12/2017), Influenza A (1/26/2020), Insomnia, Kidney stone, Leg edema, right (1/22/2020), Lymphoma ([facility]) (2/19/2017), Mixed hyperlipidemia, Nephrolithiasis (2006), Normocytic hypochromic anemia (5/20/2017), NSTEMI (non-ST elevated myocardial infarction) ([facility]) (07/18/2017), Pain, Peripheral vascular disease ([facility]), Polyneuropathy in diabetes(357.2) (9/11/2013), Renal mass (1/21/2020), Septic shock ([facility]) (5/20/2017), Skin ulcer of calf ([facility]) (2015), Stroke ([facility]) (2016), Urinary bladder disorder, Urinary incontinence, Weakness, and Wound of left leg (2012). He also has no past medical history of Suicide attempt ([facility]).   Past Surgical History:   Procedure Laterality Date   • LUMBAR TRANSFORAMINAL EPIDURAL STEROID INJECTION Right 11/2/2020    Procedure: INJECTION, STEROID, SPINE, LUMBAR, EPIDURAL, TRANSFORAMINAL APPROACH;  Surgeon: Harpal Bennett M.D.;  Location: [facility];  Service: Pain Management   • CYSTOSCOPY STENT PLACEMENT Left 2/12/2018    Procedure: CYSTOSCOPY  ;  Surgeon: Rey Barry M.D.;  Location: SURGERY [facility];  Service: Urology   • URETEROSCOPY Left 2/12/2018    Procedure: URETEROSCOPY- FLEXIBLE  ;  Surgeon: Rey Barry M.D.;  Location: SURGERY [facility];  Service: Urology   • LASERTRIPSY Left 2/12/2018    Procedure: LASERTRIPSY - LITHO  ;  Surgeon: Rey Barry M.D.;  Location: [facility];  Service: Urology   • WOUND CLOSURE GENERAL  4/3/2012    Performed by GERHARD GILBERT at SURGERY SAME DAY [facility] ORS   • [facility] CARDIAC CATH  2009    Stents to LAD, Om   • DAGOBERTO BY LAPAROSCOPY  1998   • ANGIOPLASTY  1997    RCA followed by other stents as noted above.    • [facility] CARDIAC CATH  1997    stent RCA   • CATARACT EXTRACTION WITH IOL      bilateral   •  LITHOTRIPSY     • TONSILLECTOMY     • TONSILLECTOMY AND ADENOIDECTOMY             Exam:     /80   Pulse 64   Temp 36.6 °C (97.9 °F) (Temporal)   Ht 1.829 m (6')   Wt 88.5 kg (195 lb)   SpO2 98%  Body mass index is 26.45 kg/m².    Hearing good.    Dentition fair  Alert, oriented in no acute distress.  Eye contact is good, speech goal directed, affect calm  Diabetic Foot Exam: No ulcers/laceration/blisters present on bilateral feet, normal gross anatomy of bilateral feet without abnormal curvature or arch, no toe deformities, + toenail thickness, no ingrown toenails, no increase in skin temperature bilaterally, no skin erythema to bilateral feet, bilateral dorsalis pedis 1+ and equal, Refill less than 2 seconds bilaterally, [place] 10 g monofilament testing reduced in bilateral great toes, bilateral balls of feet at medial/lateral/mid ball of foot      Assessment and Plan. The following treatment and monitoring plan is recommended:    1. Encounter for subsequent annual wellness visit (AWV) in Medicare patient     2. Iron deficiency anemia, unspecified iron deficiency anemia type  CBC WITH DIFFERENTIAL    FERRITIN    IRON/TOTAL IRON BIND   3. (HCC) Hyperlipidemia associated with type 2 diabetes mellitus  Lipid Profile    CANCELED: Lipid Profile   4. Stage 3a chronic kidney disease  Comp Metabolic Panel   5. Type 2 diabetes mellitus with other specified complication, with long-term current use of insulin (HCC)  HEMOGLOBIN A1C    Diabetic Monofilament Lower Extremity Exam   6. Encounter for hepatitis C screening test for low risk patient  HEP C VIRUS ANTIBODY     (HCC) Hyperlipidemia associated with type 2 diabetes mellitus  Chronic and stable problem.  Will update lipid panel, last check showed improvement.  Continue on atorvastatin fenofibrate in the meantime.    Stage 3 chronic kidney disease  Chronic and stable problem.  GFR is improved from low 40s up to high 40s.  Continue follow-up with nephrology,   Nylk, and limit nephrotoxic agents as able.  Will update lab work to ensure ongoing stability.    Type 2 diabetes mellitus, with long-term current use of insulin (HCC)  Chronic ongoing problem.  He unfortunately recently lost his endocrinologist and wonders what he should do for follow-up.  He has an appointment on the books in February with endocrinology group but is not sure who he will see.  Discussed that a one-to-one ratio basal versus bolus is very reasonable and thus the 28 units of Toujeo with a 5 to 7 units 3 times daily of Humalog is actually an appropriate balance in addition to the Trulicity.  Unclear how accurate his A1c's are due to chronic anemia.  Could consider checking fructosamine in the future.  Likely his fasting blood sugars are good to be the best way to make adjustments of insulin moving forward.  Advised him to keep the upcoming appoint with endocrinology and if there are no major changes and I can take over his diabetes management.  Continue on ARB, statin and baby aspirin.    Iron deficiency anemia  Chronic problem that requires follow-up.  We will update blood counts as well as iron levels to make sure he is not iron deficient.  Likely a component of his anemia is due to chronic kidney disease but we will also ensure there is no evidence of blood loss with his use of Plavix.      Services suggested: No services needed at this time  Health Care Screening recommendations as per orders if indicated.  Referrals offered: PT/OT/Nutrition counseling/Behavioral Health/Smoking cessation as per orders if indicated.    Discussion today about general wellness and lifestyle habits:    · Prevent falls and reduce trip hazards; Cautioned about securing or removing rugs.  · Have a working fire alarm and carbon monoxide detector;   · Engage in regular physical activity and social activities       Follow-up: Return in about 3 months (around 3/7/2021).

## 2020-12-08 NOTE — ASSESSMENT & PLAN NOTE
Chronic and stable problem.  Will update lipid panel, last check showed improvement.  Continue on atorvastatin fenofibrate in the meantime.

## 2020-12-08 NOTE — ASSESSMENT & PLAN NOTE
Chronic problem that requires follow-up.  We will update blood counts as well as iron levels to make sure he is not iron deficient.  Likely a component of his anemia is due to chronic kidney disease but we will also ensure there is no evidence of blood loss with his use of Plavix.

## 2020-12-08 NOTE — ASSESSMENT & PLAN NOTE
Chronic ongoing problem.  He unfortunately recently lost his endocrinologist and wonders what he should do for follow-up.  He has an appointment on the books in February with endocrinology group but is not sure who he will see.  Discussed that a one-to-one ratio basal versus bolus is very reasonable and thus the 28 units of Toujeo with a 5 to 7 units 3 times daily of Humalog is actually an appropriate balance in addition to the Trulicity.  Unclear how accurate his A1c's are due to chronic anemia.  Could consider checking fructosamine in the future.  Likely his fasting blood sugars are good to be the best way to make adjustments of insulin moving forward.  Advised him to keep the upcoming appoint with endocrinology and if there are no major changes and I can take over his diabetes management.  Continue on ARB, statin and baby aspirin.

## 2020-12-08 NOTE — ASSESSMENT & PLAN NOTE
Chronic and stable problem.  GFR is improved from low 40s up to high 40s.  Continue follow-up with nephrology, Dr. Jarvis, and limit nephrotoxic agents as able.  Will update lab work to ensure ongoing stability.

## 2020-12-10 ENCOUNTER — PATIENT MESSAGE (OUTPATIENT)
Dept: SLEEP MEDICINE | Facility: MEDICAL CENTER | Age: 75
End: 2020-12-10

## 2020-12-14 ENCOUNTER — OFFICE VISIT (OUTPATIENT)
Dept: SLEEP MEDICINE | Facility: MEDICAL CENTER | Age: 75
End: 2020-12-14
Payer: MEDICARE

## 2020-12-14 VITALS
DIASTOLIC BLOOD PRESSURE: 82 MMHG | RESPIRATION RATE: 14 BRPM | WEIGHT: 195 LBS | HEIGHT: 72 IN | BODY MASS INDEX: 26.41 KG/M2 | OXYGEN SATURATION: 97 % | SYSTOLIC BLOOD PRESSURE: 124 MMHG | HEART RATE: 60 BPM

## 2020-12-14 DIAGNOSIS — G47.33 OSA (OBSTRUCTIVE SLEEP APNEA): ICD-10-CM

## 2020-12-14 PROCEDURE — 99213 OFFICE O/P EST LOW 20 MIN: CPT | Performed by: FAMILY MEDICINE

## 2020-12-14 ASSESSMENT — FIBROSIS 4 INDEX: FIB4 SCORE: 2.73

## 2020-12-14 NOTE — PATIENT INSTRUCTIONS
"CPAP and BPAP Information  CPAP and BPAP are methods of helping a person breathe with the use of air pressure. CPAP stands for \"continuous positive airway pressure.\" BPAP stands for \"bi-level positive airway pressure.\" In both methods, air is blown through your nose or mouth and into your air passages to help you breathe well.  CPAP and BPAP use different amounts of pressure to blow air. With CPAP, the amount of pressure stays the same while you breathe in and out. With BPAP, the amount of pressure is increased when you breathe in (inhale) so that you can take larger breaths. Your health care provider will recommend whether CPAP or BPAP would be more helpful for you.  Why are CPAP and BPAP treatments used?  CPAP or BPAP can be helpful if you have:  · Sleep apnea.  · Chronic obstructive pulmonary disease (COPD).  · Heart failure.  · Medical conditions that weaken the muscles of the chest including muscular dystrophy, or neurological diseases such as amyotrophic lateral sclerosis (ALS).  · Other problems that cause breathing to be weak, abnormal, or difficult.  CPAP is most commonly used for obstructive sleep apnea (ASHLEY) to keep the airways from collapsing when the muscles relax during sleep.  How is CPAP or BPAP administered?  Both CPAP and BPAP are provided by a small machine with a flexible plastic tube that attaches to a plastic mask. You wear the mask. Air is blown through the mask into your nose or mouth. The amount of pressure that is used to blow the air can be adjusted on the machine. Your health care provider will determine the pressure setting that should be used based on your individual needs.  When should CPAP or BPAP be used?  In most cases, the mask only needs to be worn during sleep. Generally, the mask needs to be worn throughout the night and during any daytime naps. People with certain medical conditions may also need to wear the mask at other times when they are awake. Follow instructions from your " health care provider about when to use the machine.  What are some tips for using the mask?    · Because the mask needs to be snug, some people feel trapped or closed-in (claustrophobic) when first using the mask. If you feel this way, you may need to get used to the mask. One way to do this is by holding the mask loosely over your nose or mouth and then gradually applying the mask more snugly. You can also gradually increase the amount of time that you use the mask.  · Masks are available in various types and sizes. Some fit over your mouth and nose while others fit over just your nose. If your mask does not fit well, talk with your health care provider about getting a different one.  · If you are using a mask that fits over your nose and you tend to breathe through your mouth, a chin strap may be applied to help keep your mouth closed.  · The CPAP and BPAP machines have alarms that may sound if the mask comes off or develops a leak.  · If you have trouble with the mask, it is very important that you talk with your health care provider about finding a way to make the mask easier to tolerate. Do not stop using the mask. Stopping the use of the mask could have a negative impact on your health.  What are some tips for using the machine?  · Place your CPAP or BPAP machine on a secure table or stand near an electrical outlet.  · Know where the on/off switch is located on the machine.  · Follow instructions from your health care provider about how to set the pressure on your machine and when you should use it.  · Do not eat or drink while the CPAP or BPAP machine is on. Food or fluids could get pushed into your lungs by the pressure of the CPAP or BPAP.  · Do not smoke. Tobacco smoke residue can damage the machine.  · For home use, CPAP and BPAP machines can be rented or purchased through home health care companies. Many different brands of machines are available. Renting a machine before purchasing may help you find out  which particular machine works well for you.  · Keep the CPAP or BPAP machine and attachments clean. Ask your health care provider for specific instructions.  Get help right away if:  · You have redness or open areas around your nose or mouth where the mask fits.  · You have trouble using the CPAP or BPAP machine.  · You cannot tolerate wearing the CPAP or BPAP mask.  · You have pain, discomfort, and bloating in your abdomen.  Summary  · CPAP and BPAP are methods of helping a person breathe with the use of air pressure.  · Both CPAP and BPAP are provided by a small machine with a flexible plastic tube that attaches to a plastic mask.  · If you have trouble with the mask, it is very important that you talk with your health care provider about finding a way to make the mask easier to tolerate.  This information is not intended to replace advice given to you by your health care provider. Make sure you discuss any questions you have with your health care provider.  Document Released: 09/15/2005 Document Revised: 04/08/2020 Document Reviewed: 11/06/2017  Elsevier Patient Education © 2020 [employer]

## 2020-12-14 NOTE — PROGRESS NOTES
[facility] Follow Up Note     Date: 12/14/2020 / Time: 8:09 AM    Patient ID:   Name:             Av Bob   YOB: 1945  Age:                 75 y.o.  male   MRN:               [id number]      Thank you for requesting a sleep medicine consultation on Av Bob at the sleep center. He presents today with the chief complaints of ASHLEY follow up. PMH includes DM II, CAD, CVA in 2017 left hemiparesis, Afib, gout nephrosis, PVD, CKD stage III, non-Hodgkin's lymphoma in 2017, Norovirus with sepsis 5 days in ICU, psycho physiological insomnia, BPH, chronic fatigue, chronic gout, GERD, nocturnal hypoxemia, neurogenic bladder, frequent UTIs, wheelchair dependent,    HISTORY OF PRESENT ILLNESS:       Pt is currently not on tehrapy. He is getting about 6 hrs of sleep on a good night and about 4 hr of sleep on a bad night. The bad nights are about 3-4 per week. Overall, he doesnot finds his sleep refreshing.  He does  take multiple naps though out the day. The naps are usually few min to hours long.He denies any symptoms of RLS, narcolepsy or any symptoms to suggest parasomnias such as nightmares, sleep walking or acting out of dreams.The symptoms of ASHLEY has continued.     initiated treatment with CPAP 5 cmH2O and increased the pressure to a maximum of 11 cmH2O. the apnea-hypopnea index failed to normalize on any tested CPAP pressure therefore the technician tried the patient on bilevel with IPAP's ranging from 8-12 cm water and EPAP's ranging from 4-8 cm water.he did best on CPAP 12/8 cm water with a resultant AHI of 0-1.3, a minimum saturation of 89%, and a mean saturation of 94-96%    SLEEP HISTORY   PSG from 10/10/20 indicated severe OAS with AHI of 81/hr and O2 bartolo 68%. Sleep related hypoxia. PVC. The apnea index was 50.51 per hour and the hypopnea index was 30.45 per hour resulting in an overall AHI of 80.96. AHI during rem was 0.0 and AHI while supine was 79.76. 10%  of the apneas were central apneas. There was a mean oxygen saturation of 93.0% with a minimum oxygen saturation of 68.0%. Time spent with oxygen saturations below 89% was 80.9 minutes    OPO in June 2020 showed that saturations were less than or equal to 88% for 132.4 minutes with a bartolo saturation of 59%.  The oximetric pattern was sawtoothed and episodic consistent with ASHLEY.    REVIEW OF SYSTEMS:       Constitutional: Denies fevers, Denies weight changes  Eyes: Denies changes in vision, no eye pain  Ears/Nose/Throat/Mouth: Denies nasal congestion or sore throat   Cardiovascular: Denies chest pain or palpitations   Respiratory: Denies shortness of breath , Denies cough  Gastrointestinal/Hepatic: Denies abdominal pain, nausea, vomiting, diarrhea, constipation or GI bleeding   Genitourinary: Deniesdysuria or frequency  Musculoskeletal/Rheum: Denies  joint pain and swelling   Skin/Breast: Denies rash,   Neurological: Denies headache, confusion, memory loss or focal weakness/parasthesias  Psychiatric: denies mood disorder   Sleep: + snoring and ES    Comprehensive review of systems form is reviewed with the patient and is attached in the EMR.     PMH:  has a past medical history of (HCC) Chronic ulcer of right lower extremity with fat layer exposed (12/27/2018), Acute on chronic renal failure ([facility]) (1/21/2020), Acute respiratory failure with hypoxia ([facility]) (5/20/2017), CAD (coronary artery disease), Cancer ([facility]), Cataract, Cerebrovascular accident (CVA) ([facility]) (12/30/2016), Chickenpox, CKD (chronic kidney disease) stage 3, GFR 30-59 ml/min, Controlled gout (2014), Coronary atherosclerosis of native coronary artery, Daytime sleepiness, Depression, Diabetes ([facility]), Difficulty swallowing, Enterococcal septicemia ([facility]) (8/12/2017), Roberto hematuria (1/29/2020), Frequent urination, GERD (gastroesophageal reflux disease), Georgian measles, Hypertension, Hypokalemia (2012), Hypomagnesemia (08/12/2017), Influenza A (1/26/2020),  Insomnia, Kidney stone, Leg edema, right (1/22/2020), Lymphoma ([facility]) (2/19/2017), Mixed hyperlipidemia, Nephrolithiasis (2006), Normocytic hypochromic anemia (5/20/2017), NSTEMI (non-ST elevated myocardial infarction) ([facility]) (07/18/2017), Pain, Peripheral vascular disease ([facility]), Polyneuropathy in diabetes(357.2) (9/11/2013), Renal mass (1/21/2020), Septic shock ([facility]) (5/20/2017), Skin ulcer of calf ([facility]) (2015), Stroke ([facility]) (2016), Urinary bladder disorder, Urinary incontinence, Weakness, and Wound of left leg (2012). He also has no past medical history of Suicide attempt ([facility]).  MEDS:   Current Outpatient Medications:   •  glucose blood strip, OneTouch Ultra Blue Test Strip  U TO TEST TID, Disp: , Rfl:   •  ascorbic acid (VITAMIN C) 500 MG tablet, Take 500 mg by mouth every day., Disp: , Rfl:   •  Dulaglutide (TRULICITY) 1.5 MG/0.5ML Solution Pen-injector, Inject 0.5 mL under the skin every 7 days., Disp: 6 mL, Rfl: 1  •  glucose blood strip, 1 Strip by Other route 3 times a day for 90 days., Disp: 600 Strip, Rfl: 0  •  fenofibrate (TRICOR) 145 MG Tab, TAKE 1 TABLET BY MOUTH EVERY DAY, Disp: 90 Tab, Rfl: 3  •  insulin lispro (HUMALOG) 100 UNIT/ML Solution Pen-injector injection PEN, INJECT 20 UNITS UNDER THE SKIN AS INSTRUCTED DAILY, Disp: 96 mL, Rfl: 2  •  atorvastatin (LIPITOR) 40 MG Tab, Take 1 Tab by mouth every evening., Disp: 90 Tab, Rfl: 3  •  TOUJEO SOLOSTAR 300 UNIT/ML Solution Pen-injector, , Disp: , Rfl:   •  allopurinol (ZYLOPRIM) 100 MG Tab, TAKE 1 TABLET BY MOUTH EVERY DAY, Disp: 90 Tab, Rfl: 0  •  clopidogrel (PLAVIX) 75 MG Tab, TAKE 1 TABLET BY MOUTH EVERY DAY, Disp: 90 Tab, Rfl: 1  •  Insulin Pen Needle 32 G x 4 mm (BD PEN NEEDLE SWATI U/F), USE FOR INSULIN SHOTS FIVE TIMES DAILY, Disp: 500 Each, Rfl: 3  •  fluoxetine (PROZAC) 40 MG capsule, Take 1 Cap by mouth every day., Disp: 90 Cap, Rfl: 3  •  losartan (COZAAR) 50 MG Tab, Take 1 Tab by mouth 2 Times a Day., Disp: 180 Tab, Rfl: 3  •  metoprolol  SR (TOPROL XL) 100 MG TABLET SR 24 HR, Take 1 Tab by mouth every day., Disp: 90 Tab, Rfl: 3  •  amLODIPine (NORVASC) 5 MG Tab, Take 1 Tab by mouth every day., Disp: 90 Tab, Rfl: 3  •  Multiple Vitamin (MULTI VITAMIN MENS PO), Take  by mouth., Disp: , Rfl:   •  potassium chloride (KLOR-CON) 8 MEQ tablet, TAKE 1 TABLET BY MOUTH EVERY DAY, Disp: 90 Tab, Rfl: 3  •  aspirin EC (ECOTRIN) 81 MG Tablet Delayed Response, Take 81 mg by mouth every day., Disp: , Rfl:   •  Probiotic Product (PROBIOTIC DAILY PO), Take 1 Cap by mouth 2 Times a Day., Disp: , Rfl:   •  magnesium oxide (MAG-OX) 400 MG Tab, Take 400 mg by mouth every day., Disp: , Rfl:   •  finasteride (PROSCAR) 5 MG Tab, Take 1 Tab by mouth every day., Disp: 30 Tab, Rfl: 0  •  alfuzosin (UROXATRAL) 10 MG SR tablet, Take 10 mg by mouth every day., Disp: , Rfl:   •  methenamine hip (HIPPREX) 1 GM Tab, Take 1 g by mouth 2 times a day., Disp: , Rfl:   •  Insulin Glargine (TOUJEO MAX SOLOSTAR) 300 UNIT/ML Solution Pen-injector, Inject 28 Units as instructed every bedtime., Disp: , Rfl:   •  acetaminophen (TYLENOL) 500 MG Tab, Take 1,000 mg by mouth every 6 hours as needed for Mild Pain or Moderate Pain., Disp: , Rfl:   •  Cholecalciferol (VITAMIN D) 2000 units Cap, Take 4,000 Units by mouth 2 Times a Day. Once a day, Disp: , Rfl:   •  zolpidem (AMBIEN) 5 MG Tab, zolpidem 5 mg tablet  TK 1 T PO HS PRN SLEEP FOR 1 DAY.  TAKE ON THE NIGHT OF SLEEP STUDY MAY REPEAT ONCE AT BEDTIME. BRING TO SLEEP STUDY, Disp: , Rfl:   ALLERGIES:   Allergies   Allergen Reactions   • Diphenhydramine Hcl Anxiety     Pt is able to tolerate  Mg benadryl with less anxiety   • Lorazepam Unspecified     Disorientation   • Ciprofloxacin      Rash,stomach ache   • Spironolactone      Acute kidney injury     SURGHX:   Past Surgical History:   Procedure Laterality Date   • LUMBAR TRANSFORAMINAL EPIDURAL STEROID INJECTION Right 11/2/2020    Procedure: INJECTION, STEROID, SPINE, LUMBAR, EPIDURAL,  TRANSFORAMINAL APPROACH;  Surgeon: Harpal Bennett M.D.;  [facility];  Service: Pain Management   • CYSTOSCOPY STENT PLACEMENT Left 2/12/2018    Procedure: CYSTOSCOPY  ;  Surgeon: Rey Barry M.D.;  Location: SURGERY [facility];  Service: Urology   • URETEROSCOPY Left 2/12/2018    Procedure: URETEROSCOPY- FLEXIBLE  ;  Surgeon: Rey Barry M.D.;  Location: SURGERY [facility];  Service: Urology   • LASERTRIPSY Left 2/12/2018    Procedure: LASERTRIPSY - LITHO  ;  Surgeon: Rey Barry M.D.;  Location: SURGERY [facility];  Service: Urology   • WOUND CLOSURE GENERAL  4/3/2012    Performed by GERHARD GILBERT at SURGERY SAME DAY [facility] ORS   • ZZZ CARDIAC CATH  2009    Stents to LAD, Om   • DAGOBERTO BY LAPAROSCOPY  1998   • ANGIOPLASTY  1997    RCA followed by other stents as noted above.    • ZZZ CARDIAC CATH  1997    stent RCA   • CATARACT EXTRACTION WITH IOL      bilateral   • LITHOTRIPSY     • TONSILLECTOMY     • TONSILLECTOMY AND ADENOIDECTOMY       SOCHX:  reports that he has never smoked. He has never used smokeless tobacco. He reports that he does not drink alcohol or use drugs..  FH:   Family History   Problem Relation Age of Onset   • Heart Disease Father         CAD   • Diabetes Father    • Cancer Mother    • Psychiatric Illness Mother         Depression   • Depression Mother    • Kidney stones Brother    • Heart Disease Brother    • Psychiatric Illness Brother         Depression   • Depression Brother    • Suicide Attempts Other    • Psychiatric Illness Other         autism         Physical Exam:  Vitals/ General Appearance:   Weight/BMI: Body mass index is 26.45 kg/m².  /82 (BP Location: Right arm, Patient Position: Sitting, BP Cuff Size: Adult)   Pulse 60   Resp 14   Ht 1.829 m (6')   Wt 88.5 kg (195 lb)   SpO2 97%   Vitals:    12/14/20 0757   BP: 124/82   BP Location: Right arm   Patient Position: Sitting   BP Cuff Size: Adult   Pulse: 60   Resp:  14   SpO2: 97%   Weight: 88.5 kg (195 lb)   Height: 1.829 m (6')       Pt. is alert and oriented to time, place and person. Cooperative and in no apparent distress.       Constitutional: Alert, no distress, well-groomed.  Skin: No rashes in visible areas.  Eye: Round. Conjunctiva clear, lids normal. No icterus.   ENMT: Lips pink without lesions, good dentition, moist mucous membranes. Phonation normal.  Neck: No masses, no thyromegaly. Moves freely without pain.  CV: Pulse as reported by patient  Respiratory: Unlabored respiratory effort, no cough or audible wheeze  Psych: Alert and oriented x3, normal affect and mood.       ASSESSMENT AND PLAN     1. Sleep Apnea He  also has HTN, heart disease which can be worsened by her ASHLEY.     He is urged to avoid supine sleep, weight gain and alcoholic beverages since all of these can worsen sleep apnea. He is cautioned against drowsy driving. If He feels sleepy while driving, He must pull over for a break/nap, rather than persist on the road, in the interest of He own safety and that of others on the road.   Plan     - Auto CPAP vs overnight CPAP titration vs dental appliance and surgeries was dicussed in detail. After informed discussion BiPAP 12/8 cm with medium dream wisp mask   - F/u in 8-10 weeks to assess the efficiacy of recommended pressure    - SS was reviewed and discussed with the pt   - compliance was reinforced     2. Regarding treatment of other past medical problems and general health maintenance,  He is urged to follow up with PCP.

## 2021-01-04 ENCOUNTER — APPOINTMENT (RX ONLY)
Dept: URBAN - METROPOLITAN AREA CLINIC 35 | Facility: CLINIC | Age: 76
Setting detail: DERMATOLOGY
End: 2021-01-04

## 2021-01-04 DIAGNOSIS — L20.89 OTHER ATOPIC DERMATITIS: ICD-10-CM

## 2021-01-04 DIAGNOSIS — Z85.828 PERSONAL HISTORY OF OTHER MALIGNANT NEOPLASM OF SKIN: ICD-10-CM

## 2021-01-04 PROBLEM — L20.84 INTRINSIC (ALLERGIC) ECZEMA: Status: ACTIVE | Noted: 2021-01-04

## 2021-01-04 PROCEDURE — ? PRESCRIPTION

## 2021-01-04 PROCEDURE — ? COUNSELING

## 2021-01-04 PROCEDURE — 99213 OFFICE O/P EST LOW 20 MIN: CPT

## 2021-01-04 RX ORDER — TRIAMCINOLONE ACETONIDE 1 MG/G
CREAM TOPICAL TWICE A DAY
Qty: 1 | Refills: 2 | Status: ERX

## 2021-01-04 ASSESSMENT — LOCATION DETAILED DESCRIPTION DERM
LOCATION DETAILED: RIGHT PROXIMAL DORSAL FOREARM
LOCATION DETAILED: LEFT DISTAL DORSAL FOREARM

## 2021-01-04 ASSESSMENT — LOCATION ZONE DERM: LOCATION ZONE: ARM

## 2021-01-04 ASSESSMENT — LOCATION SIMPLE DESCRIPTION DERM
LOCATION SIMPLE: RIGHT FOREARM
LOCATION SIMPLE: LEFT FOREARM

## 2021-01-07 ENCOUNTER — PATIENT MESSAGE (OUTPATIENT)
Dept: SLEEP MEDICINE | Facility: MEDICAL CENTER | Age: 76
End: 2021-01-07

## 2021-01-07 ENCOUNTER — OFFICE VISIT (OUTPATIENT)
Dept: PHYSICAL MEDICINE AND REHAB | Facility: REHABILITATION | Age: 76
End: 2021-01-07
Payer: MEDICARE

## 2021-01-07 VITALS
BODY MASS INDEX: 26.41 KG/M2 | WEIGHT: 195 LBS | SYSTOLIC BLOOD PRESSURE: 122 MMHG | HEIGHT: 72 IN | HEART RATE: 62 BPM | TEMPERATURE: 98.7 F | OXYGEN SATURATION: 96 % | DIASTOLIC BLOOD PRESSURE: 78 MMHG

## 2021-01-07 DIAGNOSIS — R53.1 GENERALIZED WEAKNESS: ICD-10-CM

## 2021-01-07 DIAGNOSIS — Z86.73 HISTORY OF STROKE: ICD-10-CM

## 2021-01-07 PROCEDURE — 99214 OFFICE O/P EST MOD 30 MIN: CPT | Performed by: PHYSICAL MEDICINE & REHABILITATION

## 2021-01-07 RX ORDER — TRIAMCINOLONE ACETONIDE 1 MG/G
CREAM TOPICAL
COMMUNITY
Start: 2021-01-04 | End: 2021-09-14

## 2021-01-07 RX ORDER — ATORVASTATIN CALCIUM 40 MG/1
TABLET, FILM COATED ORAL
COMMUNITY
End: 2021-03-02

## 2021-01-07 ASSESSMENT — ENCOUNTER SYMPTOMS
BRUISES/BLEEDS EASILY: 0
DIARRHEA: 0
FALLS: 0
SHORTNESS OF BREATH: 0
CONSTIPATION: 0
CHILLS: 0
MEMORY LOSS: 0
FEVER: 0
PALPITATIONS: 0
COUGH: 0
SORE THROAT: 0

## 2021-01-07 ASSESSMENT — PATIENT HEALTH QUESTIONNAIRE - PHQ9: CLINICAL INTERPRETATION OF PHQ2 SCORE: 0

## 2021-01-07 ASSESSMENT — FIBROSIS 4 INDEX: FIB4 SCORE: 2.73

## 2021-01-07 NOTE — PROGRESS NOTES
[facility]  PM[facility]  [address] [place] [address]  Ph: [phone]    FOLLOW UP OUTPATIENT EVALUATION    Patient Name: Av Bob   Patient : 1945  Patient Age: 75 y.o.     Examining Physician: Dr. Lorie Huff, DO    PM&R History to Date - Background Information:  Dates of Admission/Discharge to ARU: [facility] Williamson - briefly 2017    SUBJECTIVE:   Patient Identification: Av Bob is a 75 y.o. 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    Accompanied by Today: Usha, wife  History of Present Illness:   - Records Reviewed: S/p R TESI L3-4, L4-5 20 - patient reported significant improvement 20.  - No new hospitalizations.   - TESI is still working well. Occasionally has a twinge, but overall improved.   - Patient had immediate relief.   - Sleeping improved, moving better.   - Spasticity continues to be an issue. Has stretch band that he uses to stretch at night.   - States doesn't really have any pain in right arm.   - Saw Urology. PVR was 25cc. Still on Proscar, Hipprex, alfuzosin.  - Performing exercises twice weekly. [facility] Gets exercise therapist as well as PT, but hasnt re-started PT since COVID.   -Does perform stretching of the lower extremity daily.  -Patient has not driven since he had his stroke 4 years ago.  He actually really would like to see if he could return to driving.    Review of Systems:  Review of Systems   Constitutional: Negative for chills and fever.   HENT: Negative for congestion and sore throat.    Respiratory: Negative for cough and shortness of breath.    Cardiovascular:  Negative for palpitations and leg swelling.   Gastrointestinal: Negative for constipation and diarrhea.   Musculoskeletal: Negative for falls and joint pain.        Improved back pain.   Neurological:        Stable weakness.  Positive spasticity.   Endo/Heme/Allergies: Does not bruise/bleed easily.   Psychiatric/Behavioral: Negative for memory loss.      All other pertinent positive review of systems are noted above in HPI.   All other systems reviewed and are negative.    Past Medical History:  Past Medical History:   Diagnosis Date   • Roberto hematuria 1/29/2020   • Influenza A 1/26/2020   • Leg edema, right 1/22/2020   • Acute on chronic renal failure (HCC) 1/21/2020   • Renal mass 1/21/2020   • (Formerly [facility]) Chronic ulcer of right lower extremity with fat layer exposed 12/27/2018   • Enterococcal septicemia (Formerly [facility]) 8/12/2017   • Hypomagnesemia 08/12/2017    etiology uncertain   • NSTEMI (non-ST elevated myocardial infarction) (Formerly [facility]) 07/18/2017    complicating UTI with sepsis   • Acute respiratory failure with hypoxia (Formerly [facility]) 5/20/2017   • Septic shock (Formerly [facility]) 5/20/2017   • Normocytic hypochromic anemia 5/20/2017   • Lymphoma (Formerly [facility]) 2/19/2017    Large cell   • Cerebrovascular accident (CVA) (Formerly [facility]) 12/30/2016    Left arm weakness  etiology of stroke not established, lymphoma discovered on MRI evaluation of stroke, L hemiparesis much worse after acute infectious illness in mid 2017, but no specific diagnosed recurrent neurological etiology, all at [facility]   • Stroke (Formerly [facility]) 2016    left sided weakness   • Skin ulcer of calf (Formerly [facility]) 2015    Right, Dr. Terry and wound care   • Controlled gout 2014   • Polyneuropathy in diabetes(357.2) 9/11/2013   • Hypokalemia 2012    controlled with combination of ACE inhibitor or ARB plus spironolactone   • Wound of left leg 2012    Requiring surgery and debridment, Dr. Moore   • Nephrolithiasis 2006    right kidney subsequent lithotripsy by Dr. Barry   • CAD  (coronary artery disease)     GIOVANNA to RCA in '97, GIOVANNA X2 to LAD and GIOVANNA X2 to OM in '09   • Cancer (HCC)     2017; chemo lympoma   • Cataract    • Chickenpox    • CKD (chronic kidney disease) stage 3, GFR 30-59 ml/min    • Coronary atherosclerosis of native coronary artery     S/P PTCA (percutaneous transluminal coronary angioplasty), RCA, 5/1997, patent on cath 7/10/2009 at the time of interventions on his left anterior descending and circumflex coronary arteries   • Daytime sleepiness    • Depression    • Diabetes (HCC)    • Difficulty swallowing    • Frequent urination    • GERD (gastroesophageal reflux disease)    • Pakistani measles    • Hypertension    • Insomnia    • Kidney stone    • Mixed hyperlipidemia    • Pain    • Peripheral vascular disease ([facility])    • Urinary bladder disorder    • Urinary incontinence    • Weakness       Past Surgical History:   Procedure Laterality Date   • LUMBAR TRANSFORAMINAL EPIDURAL STEROID INJECTION Right 11/2/2020    Procedure: INJECTION, STEROID, SPINE, LUMBAR, EPIDURAL, TRANSFORAMINAL APPROACH;  Surgeon: Harpal Bennett M.D.;  Location: SURGERY [facility]NA[facility];  Service: Pain Management   • CYSTOSCOPY STENT PLACEMENT Left 2/12/2018    Procedure: CYSTOSCOPY  ;  Surgeon: Rey Barry M.D.;  Location: SURGERY [facility];  Service: Urology   • URETEROSCOPY Left 2/12/2018    Procedure: URETEROSCOPY- FLEXIBLE  ;  Surgeon: Rey Barry M.D.;  Location: SURGERY [facility];  Service: Urology   • LASERTRIPSY Left 2/12/2018    Procedure: LASERTRIPSY - LITHO  ;  Surgeon: Rey Barry M.D.;  Location: SURGERY [facility];  Service: Urology   • WOUND CLOSURE GENERAL  4/3/2012    Performed by GERHARD GILBERT at SURGERY SAME DAY [facility] ORS   • Z CARDIAC CATH  2009    Stents to LAD, Om   • DAGOBERTO BY LAPAROSCOPY  1998   • ANGIOPLASTY  1997    RCA followed by other stents as noted above.    • ZZZ CARDIAC CATH  1997    stent RCA   • CATARACT EXTRACTION WITH IOL       bilateral   • LITHOTRIPSY     • TONSILLECTOMY     • TONSILLECTOMY AND ADENOIDECTOMY          Current Outpatient Medications:   •  triamcinolone acetonide (KENALOG) 0.1 % Cream, , Disp: , Rfl:   •  ascorbic acid (VITAMIN C) 500 MG tablet, Take 500 mg by mouth every day., Disp: , Rfl:   •  Dulaglutide (TRULICITY) 1.5 MG/0.5ML Solution Pen-injector, Inject 0.5 mL under the skin every 7 days., Disp: 6 mL, Rfl: 1  •  glucose blood strip, 1 Strip by Other route 3 times a day for 90 days., Disp: 600 Strip, Rfl: 0  •  fenofibrate (TRICOR) 145 MG Tab, TAKE 1 TABLET BY MOUTH EVERY DAY, Disp: 90 Tab, Rfl: 3  •  insulin lispro (HUMALOG) 100 UNIT/ML Solution Pen-injector injection PEN, INJECT 20 UNITS UNDER THE SKIN AS INSTRUCTED DAILY, Disp: 96 mL, Rfl: 2  •  atorvastatin (LIPITOR) 40 MG Tab, Take 1 Tab by mouth every evening., Disp: 90 Tab, Rfl: 3  •  allopurinol (ZYLOPRIM) 100 MG Tab, TAKE 1 TABLET BY MOUTH EVERY DAY, Disp: 90 Tab, Rfl: 0  •  clopidogrel (PLAVIX) 75 MG Tab, TAKE 1 TABLET BY MOUTH EVERY DAY, Disp: 90 Tab, Rfl: 1  •  Insulin Pen Needle 32 G x 4 mm (BD PEN NEEDLE SWATI U/F), USE FOR INSULIN SHOTS FIVE TIMES DAILY, Disp: 500 Each, Rfl: 3  •  fluoxetine (PROZAC) 40 MG capsule, Take 1 Cap by mouth every day., Disp: 90 Cap, Rfl: 3  •  losartan (COZAAR) 50 MG Tab, Take 1 Tab by mouth 2 Times a Day., Disp: 180 Tab, Rfl: 3  •  metoprolol SR (TOPROL XL) 100 MG TABLET SR 24 HR, Take 1 Tab by mouth every day., Disp: 90 Tab, Rfl: 3  •  amLODIPine (NORVASC) 5 MG Tab, Take 1 Tab by mouth every day., Disp: 90 Tab, Rfl: 3  •  Multiple Vitamin (MULTI VITAMIN MENS PO), Take  by mouth., Disp: , Rfl:   •  potassium chloride (KLOR-CON) 8 MEQ tablet, TAKE 1 TABLET BY MOUTH EVERY DAY, Disp: 90 Tab, Rfl: 3  •  aspirin EC (ECOTRIN) 81 MG Tablet Delayed Response, Take 81 mg by mouth every day., Disp: , Rfl:   •  Probiotic Product (PROBIOTIC DAILY PO), Take 1 Cap by mouth 2 Times a Day., Disp: , Rfl:   •  magnesium oxide (MAG-OX) 400 MG  Tab, Take 400 mg by mouth every day., Disp: , Rfl:   •  finasteride (PROSCAR) 5 MG Tab, Take 1 Tab by mouth every day., Disp: 30 Tab, Rfl: 0  •  alfuzosin (UROXATRAL) 10 MG SR tablet, Take 10 mg by mouth every day., Disp: , Rfl:   •  methenamine hip (HIPPREX) 1 GM Tab, Take 1 g by mouth 2 times a day., Disp: , Rfl:   •  Insulin Glargine (TOUJEO MAX SOLOSTAR) 300 UNIT/ML Solution Pen-injector, Inject 28 Units as instructed every bedtime., Disp: , Rfl:   •  acetaminophen (TYLENOL) 500 MG Tab, Take 1,000 mg by mouth every 6 hours as needed for Mild Pain or Moderate Pain., Disp: , Rfl:   •  Cholecalciferol (VITAMIN D) 2000 units Cap, Take 4,000 Units by mouth 2 Times a Day. Once a day, Disp: , Rfl:   •  Dulaglutide 1.5 MG/0.5ML Solution Pen-injector, Trulicity 1.5 mg/0.5 mL subcutaneous pen injector  ADM 1.5 MG SC Q 7 DAYS, Disp: , Rfl:   •  atorvastatin (LIPITOR) 40 MG Tab, atorvastatin 40 mg tablet  Take 1 tablet every day by oral route., Disp: , Rfl:   •  glucose blood strip, OneTouch Ultra Blue Test Strip  U TO TEST TID, Disp: , Rfl:   •  zolpidem (AMBIEN) 5 MG Tab, zolpidem 5 mg tablet  TK 1 T PO HS PRN SLEEP FOR 1 DAY.  TAKE ON THE NIGHT OF SLEEP STUDY MAY REPEAT ONCE AT BEDTIME. BRING TO SLEEP STUDY, Disp: , Rfl:   •  TOUJEO SOLOSTAR 300 UNIT/ML Solution Pen-injector, , Disp: , Rfl:   Allergies   Allergen Reactions   • Diphenhydramine Hcl Anxiety     Pt is able to tolerate  Mg benadryl with less anxiety   • Lorazepam Unspecified     Disorientation   • Ciprofloxacin      Rash,stomach ache   • Spironolactone      Acute kidney injury        Past Social History:  Social History     Socioeconomic History   • Marital status:      Spouse name: Not on file   • Number of children: Not on file   • Years of education: Not on file   • Highest education level: Not on file   Occupational History   • Not on file   Social Needs   • Financial resource strain: Not on file   • Food insecurity     Worry: Not on file      Inability: Not on file   • Transportation needs     Medical: Not on file     Non-medical: Not on file   Tobacco Use   • Smoking status: Never Smoker   • Smokeless tobacco: Never Used   • Tobacco comment: continued abstinence   Substance and Sexual Activity   • Alcohol use: Never   • Drug use: Never   • Sexual activity: Not Currently     Partners: Female     Comment: , one daughter, 2 grands   Lifestyle   • Physical activity     Days per week: Not on file     Minutes per session: Not on file   • Stress: Not on file   Relationships   • Social connections     Talks on phone: Not on file     Gets together: Not on file     Attends Catholic service: Not on file     Active member of club or organization: Not on file     Attends meetings of clubs or organizations: Not on file     Relationship status: Not on file   • Intimate partner violence     Fear of current or ex partner: Not on file     Emotionally abused: Not on file     Physically abused: Not on file     Forced sexual activity: Not on file   Other Topics Concern   •  Service Yes   • Blood Transfusions No   • Caffeine Concern No   • Occupational Exposure No   • Hobby Hazards No   • Sleep Concern Yes   • Stress Concern No   • Weight Concern No   • Special Diet No   • Back Care No   • Exercise Yes   • Bike Helmet No   • Seat Belt Yes   • Self-Exams Yes   Social History Narrative    Av is from [place], [place] and raised in [place]. He has been in [place] since 2004. 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  He has been  to Usha since 2003, they met through a mutual friend. He has a daughter (Kalina) and a step son (Jimi).        Family History:  Family History   Problem Relation Age of Onset   • Heart Disease  Father         CAD   • Diabetes Father    • Cancer Mother    • Psychiatric Illness Mother         Depression   • Depression Mother    • Kidney stones Brother    • Heart Disease Brother    • Psychiatric Illness Brother         Depression   • Depression Brother    • Suicide Attempts Other    • Psychiatric Illness Other         autism       Depression and Opioid Screening  PHQ-9:  Depression Screen (PHQ-2/PHQ-9) 11/23/2020 12/7/2020 1/7/2021   PHQ-2 Total Score - - -   PHQ-2 Total Score - - -   PHQ-2 Total Score 0 0 0   PHQ-9 Total Score - - -     Interpretation of PHQ-9 Total Score   Score Severity   1-4 No Depression   5-9 Mild Depression   10-14 Moderate Depression   15-19 Moderately Severe Depression   20-27 Severe Depression     OBJECTIVE:   Vital Signs:  Vitals:    01/07/21 1131   BP: 122/78   Pulse: 62   Temp: 37.1 °C (98.7 °F)   SpO2: 96%        Pertinent Labs:  Lab Results   Component Value Date/Time    SODIUM 138 08/17/2020 02:29 PM    POTASSIUM 3.7 08/17/2020 02:29 PM    CHLORIDE 103 08/17/2020 02:29 PM    CO2 22 08/17/2020 02:29 PM    GLUCOSE 91 08/17/2020 02:29 PM    BUN 31 (H) 08/17/2020 02:29 PM    CREATININE 1.44 (H) 08/17/2020 02:29 PM    CREATININE 1.4 05/28/2008 05:42 PM    BUNCREATRAT 10 03/27/2017 02:11 PM       Lab Results   Component Value Date/Time    HBA1C 5.8 (H) 06/19/2020 08:14 AM       Lab Results   Component Value Date/Time    WBC 9.5 08/17/2020 02:29 PM    RBC 4.19 (L) 08/17/2020 02:29 PM    HEMOGLOBIN 12.0 (L) 08/17/2020 02:29 PM    HEMATOCRIT 37.1 (L) 08/17/2020 02:29 PM    MCV 88.5 08/17/2020 02:29 PM    MCH 28.6 08/17/2020 02:29 PM    MCHC 32.3 (L) 08/17/2020 02:29 PM    MPV 10.2 08/17/2020 02:29 PM    NEUTSPOLYS 91.70 (H) 01/26/2020 04:39 PM    LYMPHOCYTES 1.60 (L) 01/26/2020 04:39 PM    MONOCYTES 4.30 01/26/2020 04:39 PM    EOSINOPHILS 1.10 01/26/2020 04:39 PM    BASOPHILS 0.20 01/26/2020 04:39 PM    HYPOCHROMIA 1+ 03/21/2012 01:08 PM       Lab Results   Component Value Date/Time     ASTSGOT 59 (H) 01/26/2020 04:39 PM    ALTSGPT 33 01/26/2020 04:39 PM        Physical Exam:   GEN: No apparent distress  HEENT: Head normocephalic, atraumatic.  Sclera nonicteric bilaterally, no ocular discharge appreciated bilaterally.  Mask donned.  CV: Extremities warm and well-perfused, no peripheral edema appreciated bilaterally.  PULMONARY: Breathing nonlabored on room air, no respiratory accessory muscle use.  Not requiring supplemental oxygen.  ABD: Soft, nontender.  SKIN: No appreciable skin breakdown on exposed areas of skin.  Superficial ecchymosis appreciable.  PSYCH: Mood and affect within normal limits.  MSK: Patient does have limitation of full left knee extension due lacking approximately 5 to 10 degrees.  Left hip remains externally rotated at baseline.  No tenderness to palpation along the right low back, hip in all areas (bursa, gluteus medius insertion, etc.).  Straight leg raise negative on the right.  Unable to fully assess on the left due to limitation in ability to fully extend left knee.  Slump test negative.  Negative facet loading.  NEURO: Awake alert.  Conversational.  Logical thought content.  Answers questions appropriately, does look to wife for several answers.    Tone on Modified Deuce Scale    R L  R L   Elbow extension (testing tone of elbow flexors)  2 Hip extension (testing hip flexors)  NT   Elbow flexion (testing tone of elbow extensors)  1 Hip abduction (testing adductors)  1   Wrist extension (testing tone of wrist flexors)   2 Knee extension (testing knee flexors)  2   Finger extension (testing tone of finger flexors)  2 Knee flexion (testing knee extensors)  0   Supination (testing forearm pronators)  NT Dorsiflexion (testing plantarflexors)  0      Plantarflexion (testing dorsiflexors)  0       Imaging:   MRI L Spine 2020  1.  Multifocal degenerative disease in the lumbar spine as described above.  2.  Bilateral hydronephrosis and hydroureter. This is noted on the  previous CT scan dated 1/21/2020.  3.  There is small focus of sclerosis in the L4 vertebral body. This is new since the previous study. This finding likely secondary to the degeneration. However if there is a history of carcinoma the possibility of sclerotic metastasis cannot be   excluded.    ASSESSMENT/PLAN: Av Bob  is a 75 y.o. male with rehabilitation history significant for R CVA 12/31/16 with residual left hemiparesis, general debility, here for evaluation. The following plan was discussed with the patient who is in agreement.     Visit Diagnoses     ICD-10-CM   1. History of stroke  Z86.73   2. Generalized weakness  R53.1        Rehab/Neuro:   1. 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  States level of debility is out of proportion for how well he recovered after stroke. Even before norovirus virus, while in chemo was able to go to [place], no assistive device. Can use walker a little bit, but fatigues. Also can walk with cane with exercise  or therapy. Last PT eval walked 6 min with cane. Recommend SBA.   2. General debility: ?  CIP/CIM when acutely hospitalized several years ago with norovirus  3. Fatigue: Secondary to debility vs post stroke fatigue vs sleep apnea versus combination of all of the aforementioned.  Will be getting sleep study and CPAP.  Also has low T, followed by endocrinology.  -Med management: On Plavix/ASA for stroke secondary prophylaxis  -Equipment: Non-custom wheelchair, occasionally ambulates with walker or cane.  Improved since last visit given back pain improved.  -Counseling: Extensive counseling regarding process of returning to drive including cognition evaluation, using driving simulator, physically becoming stronger.  -Therapy: Referral for physical therapy reinitiation after it was on hold due to Covid  -Therapy:  Referral to Occupational Therapy through [facility] for driving evaluation.    Assessment of Current Functional Status: Patient had made a remarkable recovery after his initial stroke in December 2016.  Wife reports that he was ambulatory without assistive device and largely had no deficits.  He even progressed to the point where he was able to perform complex balance tasks with therapy.  However, patient did suffer several medical setbacks, one including prolonged hospitalization after developing norovirus and it was at this point that he became severely debilitated requiring assistive device use as well as wheelchair.  Patient's course also complicated by diagnosis of Hodgkin's lymphoma for which he received chemotherapy shortly after his stroke.  However, wife reports that he still was able to remain independent and even travel to [place] prior to his hospitalization for norovirus.  Currently limiting his continued therapy is his right low back pain and radicular symptoms (see below neuropathic/nociceptive pain section). 1/2021 -significant improvement in terms of back pain being a limiting factor.  Had a steroid injection which significantly improved his pain and he has been able to ambulate more.    Spasticity: Significant in the left upper extremity, more minimal in the left lower extremity.  Affects wrist flexors primarily.  Concern for hamstring spasticity limiting full range of motion of the left knee extension.  Exam stable today 1/7/2021 compared to prior.  -Counseling: Counseled extensively on need to prevent spasticity from causing contracture.  Patient is not on any antispasmodics.  Discussed in detail the usefulness of antispasmodics and or Botox for prevention of spasticity in complications secondary to spasticity.  -Med management: Hold for now, patient and wife would like to continue conservative management with stretching due to the fact that he is on 70 medications already.  Personally emphasized  need to control spasticity, patient and wife aware.    Neuropathic Pain/Nociceptive Pain: Right back/hip/leg pain with cramping/numbness - suspect potential lumbo-sacral radiculitis.  No significant motor deficit on the right.  Last lumbar MRI 2005.  Denies saddle anesthesia but does state have urinary incontinence which he cannot sense, but mostly has sensation of full bladder and bowel.  Complicated by PVD, patient declined angioplasty. MRI revealed diffuse disc bulge at L3-4,4-5. 1/2021 s/p right TESI L3-4, L4-5 with significant improvement.  -Consultants: Return to spine clinic as needed    Neurogenic Bladder: Chronic urinary incontinence in setting of BPH.  Followed by urology.  Low PVRs at last visit at 25.  -Medication management: On Proscar, alpha Zosyn, Hipprex per urology.   - Followed by [facility] [place] (Dr. Barry)    Sleep: Impaired secondary to right lower extremity and back symptoms.  Now improved after epidural steroid injection.  -Continue to monitor.    Follow up: 3 months-spasticity, return to driving.    Please note that this dictation was created using voice recognition software. I have made every reasonable attempt to correct obvious errors but there may be errors of grammar and content that I may have overlooked prior to finalization of this note.    Dr. Lorie Huff DO, MS  Department of Physical Medicine & Rehabilitation  Neuro Rehabilitation Clinic  [facility]

## 2021-01-07 NOTE — TELEPHONE ENCOUNTER
From: Av Bob  To: Caitlyn Nina M.D.  Sent: 1/7/2021 7:41 AM PST  Subject: Non-Urgent Medical Question    Hi Av Ta received his Bipap device a couple days ago and has not been able to tolerate using it for more than a half an hour. He says “ there is too much air and he can’t keep his mouth closed” when wearing the mask. It is set with a ramp-up time of 30 minutes. It seems like once it gets to the prescribed pressure the air flow is too strong.  We’ll be calling [facility] today to make sure we have everything properly assembled with the device.  Please advise.  Thank you,  Av and Usha Bob

## 2021-01-15 DIAGNOSIS — Z23 NEED FOR VACCINATION: ICD-10-CM

## 2021-02-05 DIAGNOSIS — I25.10 CORONARY ARTERY DISEASE INVOLVING NATIVE CORONARY ARTERY OF NATIVE HEART WITHOUT ANGINA PECTORIS: ICD-10-CM

## 2021-02-05 RX ORDER — CLOPIDOGREL BISULFATE 75 MG/1
75 TABLET ORAL
Qty: 90 TAB | Refills: 3 | Status: SHIPPED | OUTPATIENT
Start: 2021-02-05 | End: 2021-05-19

## 2021-02-16 ENCOUNTER — TELEPHONE (OUTPATIENT)
Dept: ENDOCRINOLOGY | Facility: MEDICAL CENTER | Age: 76
End: 2021-02-16

## 2021-02-17 DIAGNOSIS — E11.69 TYPE 2 DIABETES MELLITUS WITH OTHER SPECIFIED COMPLICATION, WITH LONG-TERM CURRENT USE OF INSULIN (HCC): ICD-10-CM

## 2021-02-17 DIAGNOSIS — Z79.4 TYPE 2 DIABETES MELLITUS WITH OTHER SPECIFIED COMPLICATION, WITH LONG-TERM CURRENT USE OF INSULIN (HCC): ICD-10-CM

## 2021-02-19 ENCOUNTER — APPOINTMENT (OUTPATIENT)
Dept: ENDOCRINOLOGY | Facility: MEDICAL CENTER | Age: 76
End: 2021-02-19
Attending: INTERNAL MEDICINE
Payer: MEDICARE

## 2021-02-20 NOTE — TELEPHONE ENCOUNTER
Phone Number Called: [phone]    Call outcome: Left detailed message for patient. Informed to call back with any additional questions.    Message: Contacted and left a message notifying him labs were ordered and they are at any [facility].

## 2021-02-23 ENCOUNTER — OFFICE VISIT (OUTPATIENT)
Dept: PHYSICAL MEDICINE AND REHAB | Facility: MEDICAL CENTER | Age: 76
End: 2021-02-23
Payer: MEDICARE

## 2021-02-23 VITALS
HEIGHT: 72 IN | BODY MASS INDEX: 26.41 KG/M2 | TEMPERATURE: 96 F | HEART RATE: 63 BPM | DIASTOLIC BLOOD PRESSURE: 72 MMHG | OXYGEN SATURATION: 98 % | WEIGHT: 195 LBS | SYSTOLIC BLOOD PRESSURE: 126 MMHG | RESPIRATION RATE: 16 BRPM

## 2021-02-23 DIAGNOSIS — Z86.73 HISTORY OF STROKE: ICD-10-CM

## 2021-02-23 DIAGNOSIS — M79.2 NEUROPATHIC PAIN: ICD-10-CM

## 2021-02-23 DIAGNOSIS — G81.94 LEFT HEMIPARESIS (HCC): ICD-10-CM

## 2021-02-23 DIAGNOSIS — M54.16 LUMBAR RADICULOPATHY: ICD-10-CM

## 2021-02-23 PROCEDURE — 99214 OFFICE O/P EST MOD 30 MIN: CPT | Performed by: PHYSICAL MEDICINE & REHABILITATION

## 2021-02-23 RX ORDER — GABAPENTIN 100 MG/1
100-300 CAPSULE ORAL NIGHTLY PRN
Qty: 90 CAPSULE | Refills: 3 | Status: SHIPPED | OUTPATIENT
Start: 2021-02-23 | End: 2021-04-19

## 2021-02-23 ASSESSMENT — PAIN SCALES - GENERAL: PAINLEVEL: 4=SLIGHT-MODERATE PAIN

## 2021-02-23 ASSESSMENT — FIBROSIS 4 INDEX: FIB4 SCORE: 2.73

## 2021-02-23 ASSESSMENT — PATIENT HEALTH QUESTIONNAIRE - PHQ9: CLINICAL INTERPRETATION OF PHQ2 SCORE: 0

## 2021-02-24 NOTE — PATIENT INSTRUCTIONS
Your procedure will be at the [facility] special procedure suite.    [facility] [place] [address]       PRE-PROCEDURE INSTRUCTIONS  You may take your regular medications except:   · No Anti-inflammatories 5 days prior to your procedure. Anti-inflammatories include medicines such as  ibuprofen (Motrin, Advil), Excedrin, Naproxen (Aleve, Anaprox, Naprelan, Naprosyn), Celecoxib (Celebrex), Diclofenac (Voltaren-XR tab), and Meloxicam (Mobic).   · You can take the remainder of your pain medications as prescribed.   · If you are having a diagnostic procedure such as a medial branch block, do not use her pain meds on the day of the procedure  · No Glucophage or Metformin 24 hours before your procedure. You may resume next day after your procedure.  · Call the physiatry office if you are taking or prescribed anti-biotics within five days of procedure.  · Please ask provider if you are taking any new diabetes medication.  · CONTINUE TAKING BLOOD PRESSURE MEDICATIONS AS PRESCRIBED.  · Pain medications will not be prescribed on the procedure day. Procedural pain medication may be used by your provider   · Call your doctor's office performing the procedure if you have a fever, chills, rash or new illness prior to your procedure    Anticoagulation/antiplatelet medications  No Blood thinning medications such as Coumadin or Plavix 5 days prior to procedure unless your doctor said to continue these medications. Call your doctor if a new medication is prescribed in this class.     Restrictions for eating before procedure:   · If you are getting procedural sedation, then do not eat to for 8 hours prior to procedure appointment time. Do not drink fluids for four hours prior to your procedure time.   · If you are not having procedural sedation, then Skip the meal prior to your procedure. If you have a morning procedure then skip breakfast. If you have an afternoon procedure then skip lunch.   · You may drink clear  liquids up to 2 hours prior to your procedure  · You must have a  the day of procedure to accompany you home.      POST PROCEDURE INSTRUCTIONS   · No heavy lifting, strenuous bending or strenuous exercise for 3 days after your procedure.  · No hot tubs, baths, swimming for 3 days after your procedure  · You can remove the bandage the day after the procedure.  · IF YOU RECEIVED A STEROID INJECTION. PLEASE NOTE THAT THERE MAY BE A DELAY FOR THE INJECTION TO START WORKING, THE DELAY MAY BE UP TO TWO WEEKS. IF YOU HAVE DIABETES, PLEASE NOTE THAT YOUR SUGAR LEVELS MAY BE ELEVATED FOR 1-2 DAYS AFTER A STEROID INJECTION.  THE STEROID MAY CAUSE TEMPORARY SYMPTOMS WHICH USUALLY RESOLVE ON THEIR OWN WITHIN 1 TO 2 DAYS INCLUDING FACIAL FLUSHING OR A FEELING OF WARMTH ON THE FACE, TEMPORARY INCREASES IN BLOOD SUGAR, INSOMNIA, INCREASED HUNGER  · IF YOU EXPERIENCE PROLONGED WEAKNESS LONGER THAN ONE DAY, BOWEL OR BLADDER INCONTINENCE THEN PLEASE CALL THE PHYSIATRY OFFICE.  · Your leg may feel heavy, weak and numb for up to 1-2 days. Be very careful walking.   ·  You may resume normal activities 3 days after procedure.

## 2021-02-24 NOTE — PROGRESS NOTES
Follow up patient note  Interventional spine and sports physiatry, Physical medicine rehabilitation      Chief complaint:   No chief complaint on file.         HISTORY    Please see new patient note by Dr Bennett,  for more details.     HPI  Patient identification: Av Bob ,  1945,   With Diagnoses of Lumbar radiculopathy, Neuropathic pain, Left hemiparesis ([facility]) followed by Dr Huff with neuro rehab, and History of stroke. followed by Dr Huff with neuro rehab were pertinent to this visit.     Procedures:  Right L3-4 and L4-5 transforaminal epidural steroid injection 95% pain relief and follow-up visit    The patient initially had excellent pain relief with near complete resolution of his pain after the epidural steroid injection above for about 2 months.  The pain is started to return and is in a similar distribution and he rates the pain as 5 out of 10 in intensity aching and shooting in quality radiating down the right leg anterior posterior and lateral.       ROS Red Flags :   Fever, Chills, Sweats: Denies  Involuntary Weight Loss: Denies  Bowel/Bladder Incontinence: Denies  Saddle Anesthesia: Denies        PMHx:   Past Medical History:   Diagnosis Date   • ([facility]) Chronic ulcer of right lower extremity with fat layer exposed 2018   • Acute on chronic renal failure ([facility]) 2020   • Acute respiratory failure with hypoxia ([facility]) 2017   • CAD (coronary artery disease)     GIOVANNA to RCA in , GIOVANNA X2 to LAD and GIOVANNA X2 to OM in '   • Cancer ([facility])     2017; chemo lympoma   • Cataract    • Cerebrovascular accident (CVA) ([facility]) 2016    Left arm weakness  etiology of stroke not established, lymphoma discovered on MRI evaluation of stroke, L hemiparesis much worse after acute infectious illness in mid , but no specific diagnosed recurrent neurological etiology, all at [facility]   • Chickenpox    • CKD (chronic kidney disease) stage 3, GFR 30-59  ml/min    • Controlled gout 2014   • Coronary atherosclerosis of native coronary artery     S/P PTCA (percutaneous transluminal coronary angioplasty), RCA, 5/1997, patent on cath 7/10/2009 at the time of interventions on his left anterior descending and circumflex coronary arteries   • Daytime sleepiness    • Depression    • Diabetes ([facility])    • Difficulty swallowing    • Enterococcal septicemia ([facility]) 8/12/2017   • Roberto hematuria 1/29/2020   • Frequent urination    • GERD (gastroesophageal reflux disease)    • Vietnamese measles    • Hypertension    • Hypokalemia 2012    controlled with combination of ACE inhibitor or ARB plus spironolactone   • Hypomagnesemia 08/12/2017    etiology uncertain   • Influenza A 1/26/2020   • Insomnia    • Kidney stone    • Leg edema, right 1/22/2020   • 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 Barry   • Normocytic hypochromic anemia 5/20/2017   • NSTEMI (non-ST elevated myocardial infarction) ([facility]) 07/18/2017    complicating UTI with sepsis   • Pain    • Peripheral vascular disease ([facility])    • Polyneuropathy in diabetes(357.2) 9/11/2013   • Renal mass 1/21/2020   • Septic shock ([facility]) 5/20/2017   • Skin ulcer of calf ([facility]) 2015    Right, Dr. Terry and wound care   • Stroke ([facility]) 2016    left sided weakness   • Urinary bladder disorder    • Urinary incontinence    • Weakness    • Wound of left leg 2012    Requiring surgery and debridment, Dr. Moore       PSHx:   Past Surgical History:   Procedure Laterality Date   • LUMBAR TRANSFORAMINAL EPIDURAL STEROID INJECTION Right 11/2/2020    Procedure: INJECTION, STEROID, SPINE, LUMBAR, EPIDURAL, TRANSFORAMINAL APPROACH;  Surgeon: Harpal Bennett M.D.;  Location: SURGERY REHAB PAIN MANAGEMENT;  Service: Pain Management   • CYSTOSCOPY STENT PLACEMENT Left 2/12/2018    Procedure: CYSTOSCOPY  ;  Surgeon: Rey Barry M.D.;  Location: SURGERY [facility]  ORS;  Service: Urology   • URETEROSCOPY Left 2/12/2018    Procedure: URETEROSCOPY- FLEXIBLE  ;  Surgeon: Rey Barry M.D.;  Location: SURGERY [facility];  Service: Urology   • LASERTRIPSY Left 2/12/2018    Procedure: LASERTRIPSY - LITHO  ;  Surgeon: Rey Barry M.D.;  Location: SURGERY [facility];  Service: Urology   • WOUND CLOSURE GENERAL  4/3/2012    Performed by GERHARD GILBERT at SURGERY SAME DAY [facility] ORS   • ZZZ CARDIAC CATH  2009    Stents to LAD, Om   • DAGOBERTO BY LAPAROSCOPY  1998   • ANGIOPLASTY  1997    RCA followed by other stents as noted above.    • ZZZ CARDIAC CATH  1997    stent RCA   • CATARACT EXTRACTION WITH IOL      bilateral   • LITHOTRIPSY     • TONSILLECTOMY     • TONSILLECTOMY AND ADENOIDECTOMY         Family history   Family History   Problem Relation Age of Onset   • Heart Disease Father         CAD   • Diabetes Father    • Cancer Mother    • Psychiatric Illness Mother         Depression   • Depression Mother    • Kidney stones Brother    • Heart Disease Brother    • Psychiatric Illness Brother         Depression   • Depression Brother    • Suicide Attempts Other    • Psychiatric Illness Other         autism         Medications:   Outpatient Medications Marked as Taking for the 2/23/21 encounter (Office Visit) with Harpal Bennett M.D.   Medication Sig Dispense Refill   • gabapentin (NEURONTIN) 100 MG Cap Take 1-3 Capsules by mouth at bedtime as needed (pain). 90 capsule 3        Current Outpatient Medications on File Prior to Visit   Medication Sig Dispense Refill   • clopidogrel (PLAVIX) 75 MG Tab Take 1 Tab by mouth every day. 90 Tab 3   • Dulaglutide 1.5 MG/0.5ML Solution Pen-injector Trulicity 1.5 mg/0.5 mL subcutaneous pen injector   ADM 1.5 MG SC Q 7 DAYS     • atorvastatin (LIPITOR) 40 MG Tab atorvastatin 40 mg tablet   Take 1 tablet every day by oral route.     • triamcinolone acetonide (KENALOG) 0.1 % Cream      • glucose blood strip OneTouch Ultra Blue Test  Strip   U TO TEST TID     • ascorbic acid (VITAMIN C) 500 MG tablet Take 500 mg by mouth every day.     • Dulaglutide (TRULICITY) 1.5 MG/0.5ML Solution Pen-injector Inject 0.5 mL under the skin every 7 days. 6 mL 1   • glucose blood strip 1 Strip by Other route 3 times a day for 90 days. 600 Strip 0   • fenofibrate (TRICOR) 145 MG Tab TAKE 1 TABLET BY MOUTH EVERY DAY 90 Tab 3   • insulin lispro (HUMALOG) 100 UNIT/ML Solution Pen-injector injection PEN INJECT 20 UNITS UNDER THE SKIN AS INSTRUCTED DAILY 96 mL 2   • atorvastatin (LIPITOR) 40 MG Tab Take 1 Tab by mouth every evening. 90 Tab 3   • TOUJEO SOLOSTAR 300 UNIT/ML Solution Pen-injector      • allopurinol (ZYLOPRIM) 100 MG Tab TAKE 1 TABLET BY MOUTH EVERY DAY 90 Tab 0   • Insulin Pen Needle 32 G x 4 mm (BD PEN NEEDLE SWATI U/F) USE FOR INSULIN SHOTS FIVE TIMES DAILY 500 Each 3   • fluoxetine (PROZAC) 40 MG capsule Take 1 Cap by mouth every day. 90 Cap 3   • losartan (COZAAR) 50 MG Tab Take 1 Tab by mouth 2 Times a Day. 180 Tab 3   • metoprolol SR (TOPROL XL) 100 MG TABLET SR 24 HR Take 1 Tab by mouth every day. 90 Tab 3   • amLODIPine (NORVASC) 5 MG Tab Take 1 Tab by mouth every day. 90 Tab 3   • Multiple Vitamin (MULTI VITAMIN MENS PO) Take  by mouth.     • potassium chloride (KLOR-CON) 8 MEQ tablet TAKE 1 TABLET BY MOUTH EVERY DAY 90 Tab 3   • aspirin EC (ECOTRIN) 81 MG Tablet Delayed Response Take 81 mg by mouth every day.     • Probiotic Product (PROBIOTIC DAILY PO) Take 1 Cap by mouth 2 Times a Day.     • magnesium oxide (MAG-OX) 400 MG Tab Take 400 mg by mouth every day.     • finasteride (PROSCAR) 5 MG Tab Take 1 Tab by mouth every day. 30 Tab 0   • alfuzosin (UROXATRAL) 10 MG SR tablet Take 10 mg by mouth every day.     • methenamine hip (HIPPREX) 1 GM Tab Take 1 g by mouth 2 times a day.     • Insulin Glargine (TOUJEO MAX SOLOSTAR) 300 UNIT/ML Solution Pen-injector Inject 28 Units as instructed every bedtime.     • acetaminophen (TYLENOL) 500 MG Tab  Take 1,000 mg by mouth every 6 hours as needed for Mild Pain or Moderate Pain.     • Cholecalciferol (VITAMIN D) 2000 units Cap Take 4,000 Units by mouth 2 Times a Day. Once a day       No current facility-administered medications on file prior to visit.         Allergies:   Allergies   Allergen Reactions   • Diphenhydramine Hcl Anxiety     Pt is able to tolerate  Mg benadryl with less anxiety   • Lorazepam Unspecified     Disorientation   • Ciprofloxacin      Rash,stomach ache   • Spironolactone      Acute kidney injury       Social Hx:   Social History     Socioeconomic History   • Marital status:      Spouse name: Not on file   • Number of children: Not on file   • Years of education: Not on file   • Highest education level: Not on file   Occupational History   • Not on file   Tobacco Use   • Smoking status: Never Smoker   • Smokeless tobacco: Never Used   • Tobacco comment: continued abstinence   Substance and Sexual Activity   • Alcohol use: Never   • Drug use: Never   • Sexual activity: Not Currently     Partners: Female     Comment: , one daughter, 2 grands   Other Topics Concern   •  Service Yes   • Blood Transfusions No   • Caffeine Concern No   • Occupational Exposure No   • Hobby Hazards No   • Sleep Concern Yes   • Stress Concern No   • Weight Concern No   • Special Diet No   • Back Care No   • Exercise Yes   • Bike Helmet No   • Seat Belt Yes   • Self-Exams Yes   Social History Narrative    Av is from [place], [place] and raised in [place]. He has been in [place] since 2004. 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  He has been  to Usha since 2003, they met through a mutual friend. He has a daughter (Kalina) and a step son (Jimi).     Social  Determinants of Health     Financial Resource Strain:    • Difficulty of Paying Living Expenses:    Food Insecurity:    • Worried About Running Out of Food in the Last Year:    • Ran Out of Food in the Last Year:    Transportation Needs:    • Lack of Transportation (Medical):    • Lack of Transportation (Non-Medical):    Physical Activity:    • Days of Exercise per Week:    • Minutes of Exercise per Session:    Stress:    • Feeling of Stress :    Social Connections:    • Frequency of Communication with Friends and Family:    • Frequency of Social Gatherings with Friends and Family:    • Attends Orthodoxy Services:    • Active Member of Clubs or Organizations:    • Attends Club or Organization Meetings:    • Marital Status:    Intimate Partner Violence:    • Fear of Current or Ex-Partner:    • Emotionally Abused:    • Physically Abused:    • Sexually Abused:          EXAMINATION     Physical Exam:   Vitals: /72 (BP Location: Right arm, Patient Position: Sitting, BP Cuff Size: Adult)   Pulse 63   Temp (!) 35.6 °C (96 °F) (Temporal)   Resp 16   Ht 1.829 m (6')   Wt 88.5 kg (195 lb)   SpO2 98%     Constitutional:   Body Habitus: Body mass index is 26.45 kg/m².  Cooperation: Fully cooperates with exam  Appearance: Well-groomed no disheveled    Respiratory-  breathing comfortable on room air, no audible wheezing  Cardiovascular- capillary refills less than 2 seconds. No lower extremity edema is noted.   Psychiatric- alert and oriented ×3. Normal affect.    MSK and Neuro: -    Straight leg raise and slump test are positive right negative left.  No tenderness palpation throughout the spine.    Motor Exam Lower Extremities     ? Myotome R L   Hip flexion L2 5 4-   Knee extension L3 5 4-   Ankle dorsiflexion L4 5 4-   Toe extension L5 5 4-   Ankle plantarflexion S1 5 4-                MEDICAL DECISION MAKING    DATA    Labs:   Lab Results   Component Value Date/Time    SODIUM 138 08/17/2020 02:29 PM    POTASSIUM  3.7 08/17/2020 02:29 PM    CHLORIDE 103 08/17/2020 02:29 PM    CO2 22 08/17/2020 02:29 PM    GLUCOSE 91 08/17/2020 02:29 PM    BUN 31 (H) 08/17/2020 02:29 PM    CREATININE 1.44 (H) 08/17/2020 02:29 PM    CREATININE 1.4 05/28/2008 05:42 PM    BUNCREATRAT 10 03/27/2017 02:11 PM        Lab Results   Component Value Date/Time    PROTHROMBTM 15.3 (H) 01/22/2020 12:35 PM    INR 1.19 (H) 01/22/2020 12:35 PM        Lab Results   Component Value Date/Time    WBC 9.5 08/17/2020 02:29 PM    RBC 4.19 (L) 08/17/2020 02:29 PM    HEMOGLOBIN 12.0 (L) 08/17/2020 02:29 PM    HEMATOCRIT 37.1 (L) 08/17/2020 02:29 PM    MCV 88.5 08/17/2020 02:29 PM    MCH 28.6 08/17/2020 02:29 PM    MCHC 32.3 (L) 08/17/2020 02:29 PM    MPV 10.2 08/17/2020 02:29 PM    NEUTSPOLYS 91.70 (H) 01/26/2020 04:39 PM    LYMPHOCYTES 1.60 (L) 01/26/2020 04:39 PM    MONOCYTES 4.30 01/26/2020 04:39 PM    EOSINOPHILS 1.10 01/26/2020 04:39 PM    BASOPHILS 0.20 01/26/2020 04:39 PM    HYPOCHROMIA 1+ 03/21/2012 01:08 PM        Lab Results   Component Value Date/Time    HBA1C 5.8 (H) 06/19/2020 08:14 AM          Imaging:   I personally reviewed following images    I reviewed the fluoroscopic images from 11/2/2020 showing successful right L3-4 and L4-5 transforaminal epidural steroid injection.  I reviewed these with the patient at the visit on 11/23/2020.    I reviewed the following radiology reports                                Results for orders placed during the hospital encounter of 09/24/20   MR-LUMBAR SPINE-W/O    Impression 1.  Multifocal degenerative disease in the lumbar spine as described above.  2.  Bilateral hydronephrosis and hydroureter. This is noted on the previous CT scan dated 1/21/2020.  3.  There is small focus of sclerosis in the L4 vertebral body. This is new since the previous study. This finding likely secondary to the degeneration. However if there is a history of carcinoma the possibility of sclerotic metastasis cannot be   excluded.                                                                   Results for orders placed in visit on 20   DX-CHEST-2 VIEWS    Impression No acute cardiopulmonary disease.                                                                                   DIAGNOSIS   Visit Diagnoses     ICD-10-CM   1. Lumbar radiculopathy  M54.16   2. Neuropathic pain  M79.2   3. Left hemiparesis (HCC) followed by Dr Huff with neuro rehab  G81.94   4. History of stroke. followed by Dr Huff with neuro rehab  Z86.73         ASSESSMENT and PLAN:     Av Bob  1945 male      Diagnoses and all orders for this visit:    Lumbar radiculopathy  -     gabapentin (NEURONTIN) 100 MG Cap; Take 1-3 Capsules by mouth at bedtime as needed (pain).  -     REFERRAL TO PHYSICAL MEDICINE REHAB    Neuropathic pain  -     gabapentin (NEURONTIN) 100 MG Cap; Take 1-3 Capsules by mouth at bedtime as needed (pain).    Left hemiparesis (HCC) followed by Dr Huff with neuro rehab    History of stroke. followed by Dr Huff with neuro rehab        I reviewed the previous notes from Dr. Lorie Huff with neuro rehab.    The patient has had a recurrence of his radiculopathy similar to his symptoms previously and he initially had excellent results with pain and function after the epidural steroid injection.    I have ordered a right L3-4 and L4-5 transforaminal epidural steroid injection    The risks benefits and alternatives to this procedure were discussed and the patient wishes to proceed with the procedure. Risks include but are not limited to damage to surrounding structures, infection, bleeding, worsening of pain which can be permanent, weakness which can be permanent. Benefits include pain relief, improved function. Alternatives includes not doing the procedure.      As long as this is cleared with the patient's PCP, stop antiplatelet and anticoagulation including Plavix 5 days prior to the procedure above.  If he is unable to stop these  medications then we decided it would be still prudent to proceed and the patient understands the increased risk of bleeding and hematoma as well as nerve damage if he must remain on these medications.    He can continue gabapentin    Follow up: After the above procedure    Thank you for allowing me to participate in the care of this patient. If you have any questions please not hesitate to contact me.             Please note that this dictation was created using voice recognition software. I have made every reasonable attempt to correct obvious errors but there may be errors of grammar and content that I may have overlooked prior to finalization of this note.      Harpal Bennett MD  Interventional Spine and Sports Physiatry  Physical Medicine and Rehabilitation  [facility]

## 2021-02-25 ENCOUNTER — HOSPITAL ENCOUNTER (OUTPATIENT)
Dept: LAB | Facility: MEDICAL CENTER | Age: 76
End: 2021-02-25
Attending: INTERNAL MEDICINE
Payer: MEDICARE

## 2021-02-25 DIAGNOSIS — N18.31 STAGE 3A CHRONIC KIDNEY DISEASE: ICD-10-CM

## 2021-02-25 DIAGNOSIS — D50.9 IRON DEFICIENCY ANEMIA, UNSPECIFIED IRON DEFICIENCY ANEMIA TYPE: ICD-10-CM

## 2021-02-25 DIAGNOSIS — Z79.4 TYPE 2 DIABETES MELLITUS WITH OTHER SPECIFIED COMPLICATION, WITH LONG-TERM CURRENT USE OF INSULIN (HCC): ICD-10-CM

## 2021-02-25 DIAGNOSIS — E11.69 TYPE 2 DIABETES MELLITUS WITH OTHER SPECIFIED COMPLICATION, WITH LONG-TERM CURRENT USE OF INSULIN (HCC): ICD-10-CM

## 2021-02-25 DIAGNOSIS — Z11.59 ENCOUNTER FOR HEPATITIS C SCREENING TEST FOR LOW RISK PATIENT: ICD-10-CM

## 2021-02-25 LAB
ALBUMIN SERPL BCP-MCNC: 3.5 G/DL (ref 3.2–4.9)
ALBUMIN/GLOB SERPL: 1.4 G/DL
ALP SERPL-CCNC: 63 U/L (ref 30–99)
ALT SERPL-CCNC: 18 U/L (ref 2–50)
ANION GAP SERPL CALC-SCNC: 10 MMOL/L (ref 7–16)
AST SERPL-CCNC: 20 U/L (ref 12–45)
BASOPHILS # BLD AUTO: 0.7 % (ref 0–1.8)
BASOPHILS # BLD: 0.06 K/UL (ref 0–0.12)
BILIRUB SERPL-MCNC: 0.5 MG/DL (ref 0.1–1.5)
BUN SERPL-MCNC: 25 MG/DL (ref 8–22)
CALCIUM SERPL-MCNC: 9.1 MG/DL (ref 8.5–10.5)
CHLORIDE SERPL-SCNC: 107 MMOL/L (ref 96–112)
CHOLEST SERPL-MCNC: 127 MG/DL (ref 100–199)
CO2 SERPL-SCNC: 26 MMOL/L (ref 20–33)
CREAT SERPL-MCNC: 1.33 MG/DL (ref 0.5–1.4)
EOSINOPHIL # BLD AUTO: 0.45 K/UL (ref 0–0.51)
EOSINOPHIL NFR BLD: 5.5 % (ref 0–6.9)
ERYTHROCYTE [DISTWIDTH] IN BLOOD BY AUTOMATED COUNT: 45.8 FL (ref 35.9–50)
EST. AVERAGE GLUCOSE BLD GHB EST-MCNC: 160 MG/DL
FERRITIN SERPL-MCNC: 108 NG/ML (ref 22–322)
GLOBULIN SER CALC-MCNC: 2.5 G/DL (ref 1.9–3.5)
GLUCOSE SERPL-MCNC: 111 MG/DL (ref 65–99)
HBA1C MFR BLD: 7.2 % (ref 4–5.6)
HCT VFR BLD AUTO: 42.4 % (ref 42–52)
HCV AB SER QL: NORMAL
HDLC SERPL-MCNC: 31 MG/DL
HGB BLD-MCNC: 13.7 G/DL (ref 14–18)
IMM GRANULOCYTES # BLD AUTO: 0.07 K/UL (ref 0–0.11)
IMM GRANULOCYTES NFR BLD AUTO: 0.9 % (ref 0–0.9)
IRON SATN MFR SERPL: 32 % (ref 15–55)
IRON SERPL-MCNC: 97 UG/DL (ref 50–180)
LDLC SERPL CALC-MCNC: 76 MG/DL
LYMPHOCYTES # BLD AUTO: 1.09 K/UL (ref 1–4.8)
LYMPHOCYTES NFR BLD: 13.4 % (ref 22–41)
MCH RBC QN AUTO: 29 PG (ref 27–33)
MCHC RBC AUTO-ENTMCNC: 32.3 G/DL (ref 33.7–35.3)
MCV RBC AUTO: 89.6 FL (ref 81.4–97.8)
MONOCYTES # BLD AUTO: 0.58 K/UL (ref 0–0.85)
MONOCYTES NFR BLD AUTO: 7.1 % (ref 0–13.4)
NEUTROPHILS # BLD AUTO: 5.89 K/UL (ref 1.82–7.42)
NEUTROPHILS NFR BLD: 72.4 % (ref 44–72)
NRBC # BLD AUTO: 0 K/UL
NRBC BLD-RTO: 0 /100 WBC
PLATELET # BLD AUTO: 296 K/UL (ref 164–446)
PMV BLD AUTO: 10.5 FL (ref 9–12.9)
POTASSIUM SERPL-SCNC: 3.9 MMOL/L (ref 3.6–5.5)
PROT SERPL-MCNC: 6 G/DL (ref 6–8.2)
RBC # BLD AUTO: 4.73 M/UL (ref 4.7–6.1)
SODIUM SERPL-SCNC: 143 MMOL/L (ref 135–145)
TIBC SERPL-MCNC: 306 UG/DL (ref 250–450)
TRIGL SERPL-MCNC: 102 MG/DL (ref 0–149)
UIBC SERPL-MCNC: 209 UG/DL (ref 110–370)
WBC # BLD AUTO: 8.1 K/UL (ref 4.8–10.8)

## 2021-02-25 PROCEDURE — 82728 ASSAY OF FERRITIN: CPT

## 2021-02-25 PROCEDURE — 83036 HEMOGLOBIN GLYCOSYLATED A1C: CPT | Mod: GA

## 2021-02-25 PROCEDURE — 80061 LIPID PANEL: CPT

## 2021-02-25 PROCEDURE — 80053 COMPREHEN METABOLIC PANEL: CPT

## 2021-02-25 PROCEDURE — 83540 ASSAY OF IRON: CPT

## 2021-02-25 PROCEDURE — 86803 HEPATITIS C AB TEST: CPT

## 2021-02-25 PROCEDURE — 85025 COMPLETE CBC W/AUTO DIFF WBC: CPT

## 2021-02-25 PROCEDURE — 36415 COLL VENOUS BLD VENIPUNCTURE: CPT

## 2021-02-25 PROCEDURE — 83550 IRON BINDING TEST: CPT

## 2021-03-02 ENCOUNTER — OFFICE VISIT (OUTPATIENT)
Dept: ENDOCRINOLOGY | Facility: MEDICAL CENTER | Age: 76
End: 2021-03-02
Attending: NURSE PRACTITIONER
Payer: MEDICARE

## 2021-03-02 VITALS
OXYGEN SATURATION: 96 % | BODY MASS INDEX: 25.73 KG/M2 | DIASTOLIC BLOOD PRESSURE: 80 MMHG | HEART RATE: 61 BPM | HEIGHT: 72 IN | WEIGHT: 190 LBS | SYSTOLIC BLOOD PRESSURE: 120 MMHG

## 2021-03-02 DIAGNOSIS — E11.69 HYPERLIPIDEMIA ASSOCIATED WITH TYPE 2 DIABETES MELLITUS (HCC): ICD-10-CM

## 2021-03-02 DIAGNOSIS — Z79.4 TYPE 2 DIABETES MELLITUS WITH OTHER SPECIFIED COMPLICATION, WITH LONG-TERM CURRENT USE OF INSULIN (HCC): ICD-10-CM

## 2021-03-02 DIAGNOSIS — E11.69 TYPE 2 DIABETES MELLITUS WITH OTHER SPECIFIED COMPLICATION, WITH LONG-TERM CURRENT USE OF INSULIN (HCC): ICD-10-CM

## 2021-03-02 DIAGNOSIS — E55.9 VITAMIN D DEFICIENCY: ICD-10-CM

## 2021-03-02 DIAGNOSIS — E78.5 HYPERLIPIDEMIA ASSOCIATED WITH TYPE 2 DIABETES MELLITUS (HCC): ICD-10-CM

## 2021-03-02 PROCEDURE — 99213 OFFICE O/P EST LOW 20 MIN: CPT | Performed by: NURSE PRACTITIONER

## 2021-03-02 PROCEDURE — 99214 OFFICE O/P EST MOD 30 MIN: CPT | Performed by: NURSE PRACTITIONER

## 2021-03-02 ASSESSMENT — FIBROSIS 4 INDEX: FIB4 SCORE: 1.16

## 2021-03-02 NOTE — PROGRESS NOTES
CHIEF COMPLAINT: Patient is here for follow up of Type 2 Diabetes Mellitus, hyperlipidemia and Vitamin D deficiency.    Previously seen by Dr. Rasheed with last appointment on 2020.    HPI:     Av Bob is a 75 y.o. male with for continued evaluation & treatment of the followin.  Type 2 Diabetes Mellitus  Mr. Corea is a very pleasant 75-year-old male who is currently wheelchair-bound.  He is have increased back pain in the last several months.  He sees Dr. Bennett with pain management for routine steroid injections.    Current diabetes regimen:  Trulicity 1.5 mg weekly  Farxiga/stopped due to yeast infections.  Toujeo 28 units daily  Humalog 5 units QD, Correction dosage is 2:50 greater than 150.    Labs from 2021 HbA1c is 7.2    BG Diary:21 patient brought in glucose log.  Patient checks home glucose 3-4 times daily.  AM Fasting   PM-140-180    Weight decreased 5 pounds since last visit.    Diabetes Complications   Retinopathy: Known retinopathy.  Last eye exam: 2021 with Dr. Cox.  Neuropathy: Positive for neuropathy in BLE. Denies any foot wounds.  Exercise: Minimal.  Diet: Fair.    2.  Hyperlipidemia  Currently taking Lipitor 40 mg daily.  Patient denies muscle weakness and/or fatigue.    3.  Vitamin D deficiency  Currently taking vitamin D 2000 units daily.    Patient's medications, allergies, and social histories were reviewed and updated as appropriate.    ROS:     CONS:     No fever, no chills   EYES:     No diplopia, no blurry vision   CV:           No chest pain, no palpitations   PULM:     No SOB, no cough, no hemoptysis.   GI:            No nausea, no vomiting, no diarrhea, no constipation   ENDO:     No polyuria, no polydipsia, no heat intolerance, no cold intolerance       Past Medical History:  Problem List:  2020: Right posterior hip pain and right leg cramping  2020: ASHLEY (obstructive sleep apnea)  2020: Low testosterone  in male  2020-07: Nocturnal hypoxemia  2020-06: Chronic fatigue  2020-05: Essential hypertension, benign  2020-02: Polypharmacy  2020-01: Renal cyst  2020-01: Benign prostatic hyperplasia with urinary obstruction  2020-01: Roberto hematuria  2020-01: Influenza A  2020-01: Leg edema, right  2020-01: Altered mobility due to old stroke  2020-01: Hematuria  2020-01: Renal mass  2020-01: Sepsis ([facility])  2020-01: Normocytic anemia  2020-01: Acute on chronic renal failure ([facility])  2020-01: Hydronephrosis  2019-11: History of renal calculi  2019-11: Neurogenic bladder  2019-10: PVD (peripheral vascular disease) ([facility])  2019-07: Diarrhea  2019-06: Acute cystitis with hematuria  2019-05: Snapping hip syndrome, right  2019-05: Strain of flexor muscle of right hip  2019-05: Strain of right hamstring  2019-05: Muscle strain of right gluteal region  2019-05: Left hand weakness  2019-02: Urinary incontinence  2018-12: Chronic ulcer of left lower extremity with fat layer exposed   ([facility])  2018-12: ([facility]) Chronic ulcer of right lower extremity with fat layer   exposed  2018-12: ([facility]) Tibial artery disease  2018-11: H/O TIA (transient ischemic attack) and stroke  2018-10: GERD with esophagitis  2018-04: Recurrent UTI  2018-04: Dyslipidemia  2018-01: Depression  2018-01: Acute kidney injury ([facility])  2018-01: Microalbuminuria due to type 2 diabetes mellitus ([facility])  2017-12: ([facility]) Left hemiparesis  2017-12: Adjustment disorder with depressed mood  2017-11: Enterococcus UTI  2017-11: Common cold  2017-11: ([facility]) Diabetic nephropathy associated with type 2 diabetes   mellitus  2017-11: Acute prerenal azotemia  2017-11: Loose stools  2017-11: Psychophysiological insomnia  2017-11: ([facility]) Moderate episode of recurrent major depressive disorder  2017-11: Wheelchair dependent  2017-08: Enterococcal septicemia ([facility])  2017-08: Hypomagnesemia  2017-07: NSTEMI (non-ST elevated myocardial infarction) ([facility])  2017-07: Obstruction of left ureteropelvic junction  due to stone  2017-05: Paroxysmal atrial fibrillation ([facility])  2017-05: Acute respiratory failure with hypoxia ([facility])  2017-05: Iron deficiency anemia  2017-05: Septic shock ([facility])  2017-05: H/O non-Hodgkin's lymphoma  2017-03: ([facility]) Hypertension associated with diabetes  2017-01: Abnormal brain MRI  2016-12: Type 2 diabetes mellitus, with long-term current use of   insulin ([facility])  2016-12: H/O Cerebrovascular accident (CVA) in adulthood  2016-12: Coronary artery disease involving native coronary artery  2016-08: DM (diabetes mellitus) type II controlled, neurological   manifestation ([facility])  2016-08: Stage 3 chronic kidney disease  2016-08: Long-term use of high-risk medication  2015-11: Skin ulcer of calf (CMS-HCC)  2014-05: History of myocardial infarction  2014-01: Diabetes mellitus, type 2 ([facility])  2014-01: Idiopathic chronic gout of multiple sites without tophus  2013-09: Type II diabetes mellitus, uncontrolled ([facility])  2013-09: Type II or unspecified type diabetes mellitus with   neurological manifestations, uncontrolled(250.62)  2013-09: Diabetic polyneuropathy (CMS-HCC)  2013-09: Nephritis and nephropathy, with pathological lesion in kidney  2013-09: Encounter for long-term (current) use of insulin (CMS-HCC)  2012-12: Hypokalemia  2011-12: Presence of BMS and drug coated stents in LAD coronary artery  2011-12: Presence of drug coated stents in left circumflex coronary   artery  2011-12: S/P PTCA (percutaneous transluminal coronary angioplasty),   RCA, 5/1997, patent 7/10/2009  2011-12: ([facility]) Hyperlipidemia associated with type 2 diabetes mellitus  2011-08: Ulcer of lower limb ([facility])  Myocardial infarction ([facility])  Degeneration of cervical intervertebral disc  History of nephrolithiasis      Past Surgical History:  Past Surgical History:   Procedure Laterality Date   • LUMBAR TRANSFORAMINAL EPIDURAL STEROID INJECTION Right 11/2/2020    Procedure: INJECTION, STEROID, SPINE, LUMBAR, EPIDURAL, TRANSFORAMINAL APPROACH;   Surgeon: Harpal Bennett M.D.;  [facility];  Service: Pain Management   • CYSTOSCOPY STENT PLACEMENT Left 2/12/2018    Procedure: CYSTOSCOPY  ;  Surgeon: Rey Barry M.D.;  Location: SURGERY [facility];  Service: Urology   • URETEROSCOPY Left 2/12/2018    Procedure: URETEROSCOPY- FLEXIBLE  ;  Surgeon: Rey Barry M.D.;  Location: SURGERY [facility];  Service: Urology   • LASERTRIPSY Left 2/12/2018    Procedure: LASERTRIPSY - LITHO  ;  Surgeon: Rey Barry M.D.;  Location: SURGERY [facility];  Service: Urology   • WOUND CLOSURE GENERAL  4/3/2012    Performed by [facility] GILE[facility]RT at SURGERY SAME DAY [facility] ORS   • ZZZ CARDIAC CATH  2009    Stents to LAD, Om   • DAGOBERTO BY LAPAROSCOPY  1998   • ANGIOPLASTY  1997    RCA followed by other stents as noted above.    • ZZZ CARDIAC CATH  1997    stent RCA   • CATARACT EXTRACTION WITH IOL      bilateral   • LITHOTRIPSY     • TONSILLECTOMY     • TONSILLECTOMY AND ADENOIDECTOMY          Allergies:  Diphenhydramine hcl, Lorazepam, Ciprofloxacin, and Spironolactone     Social History:  Social History     Tobacco Use   • Smoking status: Never Smoker   • Smokeless tobacco: Never Used   • Tobacco comment: continued abstinence   Substance Use Topics   • Alcohol use: Never   • Drug use: Never        Family History:   family history includes Cancer in his mother; Depression in his brother and mother; Diabetes in his father; Heart Disease in his brother and father; Kidney stones in his brother; Psychiatric Illness in his brother, mother, and another family member; Suicide Attempts in an other family member.      PHYSICAL EXAM:   OBJECTIVE:  Vital signs: /80   Pulse 61   Ht 1.829 m (6')   Wt 86.2 kg (190 lb)   SpO2 96%   BMI 25.77 kg/m²   GENERAL: Well-developed, well-nourished in no apparent distress.   EYE:  No ocular asymmetry, PERRLA  HENT: Pink, moist mucous membranes.    NECK: No thyromegaly.   CARDIOVASCULAR:  No  murmurs  LUNGS: Clear breath sounds  ABDOMEN: Soft, nontender   EXTREMITIES: No clubbing, cyanosis, or edema.   NEUROLOGICAL: No gross focal motor abnormalities   LYMPH: No cervical adenopathy palpated.   SKIN: No rashes, lesions.     ASSESSMENT/PLAN:   1. Type 2 diabetes mellitus with other specified complication, with long-term current use of insulin (HCC)  Unstable.  Continue diabetes regimen:  Toujeo 28 units daily.  Humalog 5 units QD before largest meal. Correction dosage is 2: 50 greater than 150.  Increase Trulicity to 3 mg weekly.    Continue daily monitoring of glucose levels at home 2-3 times a day.  Continue daily foot inspections.  Continue activity as tolerated pending back pain or discomfort.  Encouraged to drink 2 to 3 L of water each day.  Annual dilated eye exam is complete until next year.    2. (HCC) Hyperlipidemia associated with type 2 diabetes mellitus  Stable.  Continue Lipitor 40 mg daily.    3. Vitamin D deficiency  Stable.  Continue vitamin D 2000 IU daily.       Repeat point-of-care A1c at next appointment.  Next appointment in 4 months.    Thank you kindly for allowing me to participate in the diabetes care plan for this patient.    Lo Baxter, APRN  03/02/21    CC:   Madison Bunch D.O.

## 2021-03-02 NOTE — PROGRESS NOTES
Foot Exam:  Monofilament: done  Monofilament testing with a 10 gram force: sensation intact: decreased bilaterally  Visual Inspection: Feet without maceration, ulcers, fissures.  Pedal pulses: intact bilaterally

## 2021-03-03 ENCOUNTER — TELEMEDICINE (OUTPATIENT)
Dept: NEPHROLOGY | Facility: MEDICAL CENTER | Age: 76
End: 2021-03-03
Payer: MEDICARE

## 2021-03-03 VITALS
SYSTOLIC BLOOD PRESSURE: 154 MMHG | HEART RATE: 58 BPM | BODY MASS INDEX: 27.09 KG/M2 | TEMPERATURE: 97.3 F | WEIGHT: 200 LBS | DIASTOLIC BLOOD PRESSURE: 90 MMHG | HEIGHT: 72 IN

## 2021-03-03 DIAGNOSIS — D64.9 ANEMIA, UNSPECIFIED TYPE: ICD-10-CM

## 2021-03-03 DIAGNOSIS — I10 ESSENTIAL HYPERTENSION: ICD-10-CM

## 2021-03-03 DIAGNOSIS — N18.31 STAGE 3A CHRONIC KIDNEY DISEASE: ICD-10-CM

## 2021-03-03 DIAGNOSIS — E55.9 VITAMIN D DEFICIENCY: ICD-10-CM

## 2021-03-03 DIAGNOSIS — E11.29 MICROALBUMINURIA DUE TO TYPE 2 DIABETES MELLITUS (HCC): ICD-10-CM

## 2021-03-03 DIAGNOSIS — R80.9 MICROALBUMINURIA DUE TO TYPE 2 DIABETES MELLITUS (HCC): ICD-10-CM

## 2021-03-03 PROCEDURE — 99214 OFFICE O/P EST MOD 30 MIN: CPT | Mod: 95,CR | Performed by: INTERNAL MEDICINE

## 2021-03-03 ASSESSMENT — ENCOUNTER SYMPTOMS
WHEEZING: 0
BACK PAIN: 1
DIARRHEA: 0
WEIGHT LOSS: 0
NAUSEA: 0
ABDOMINAL PAIN: 0
EYES NEGATIVE: 1
FLANK PAIN: 0
FEVER: 0
MYALGIAS: 0
HEMOPTYSIS: 0
SHORTNESS OF BREATH: 0
VOMITING: 0
PALPITATIONS: 0
SINUS PAIN: 0
NECK PAIN: 0
COUGH: 0
ORTHOPNEA: 0
CHILLS: 0

## 2021-03-03 ASSESSMENT — FIBROSIS 4 INDEX: FIB4 SCORE: 1.16

## 2021-03-03 NOTE — PROGRESS NOTES
Subjective:      Av Bob is a 75 y.o. male who presents with Follow-Up and Chronic Kidney Disease          Telemedicine: Established Patient   This evaluation was conducted via zoom using secure and encrypted videoconferencing technology. Patient identity was verified. Verbal consent was obtained    Chronic Kidney Disease  Pertinent negatives include no abdominal pain, chest pain, chills, congestion, coughing, fever, myalgias, nausea, neck pain or vomiting.     Av is coming today for f/u of CKD III, microalbuminuria  recurrent UTI,   Doing well, no complaints  Creat level stable at 1.3-1.4 -baseline  No difficulties to urinate.No dysuria No flank pain  (+) for urinary incontinence   HTN: BP well controlled at home -today elevated SBP -did not take morning BP meds dose  Vit D and PTH well controlled  Anemia: Hb stable    Review of Systems   Constitutional: Negative for chills, fever, malaise/fatigue and weight loss.   HENT: Negative for congestion, hearing loss and sinus pain.    Eyes: Negative.    Respiratory: Negative for cough, hemoptysis, shortness of breath and wheezing.    Cardiovascular: Negative for chest pain, palpitations, orthopnea and leg swelling.   Gastrointestinal: Negative for abdominal pain, diarrhea, nausea and vomiting.   Genitourinary: Negative for dysuria, flank pain, hematuria and urgency.   Musculoskeletal: Positive for back pain. Negative for joint pain, myalgias and neck pain.   Skin: Negative.    All other systems reviewed and are negative.    Past Medical History:   Diagnosis Date   • ([facility]) Chronic ulcer of right lower extremity with fat layer exposed 12/27/2018   • Acute on chronic renal failure ([facility]) 1/21/2020   • Acute respiratory failure with hypoxia ([facility]) 5/20/2017   • CAD (coronary artery disease)     GIOVANNA to RCA in '97, GIOVANNA X2 to LAD and GIOVANNA X2 to OM in '09   • Cancer ([facility])     2017; chemo lympoma   • Cataract    • Cerebrovascular accident (CVA) ([facility]) 12/30/2016     Left arm weakness  etiology of stroke not established, lymphoma discovered on MRI evaluation of stroke, L hemiparesis much worse after acute infectious illness in mid 2017, but no specific diagnosed recurrent neurological etiology, all at [facility]   • Chickenpox    • CKD (chronic kidney disease) stage 3, GFR 30-59 ml/min    • Controlled gout 2014   • Coronary atherosclerosis of native coronary artery     S/P PTCA (percutaneous transluminal coronary angioplasty), RCA, 5/1997, patent on cath 7/10/2009 at the time of interventions on his left anterior descending and circumflex coronary arteries   • Daytime sleepiness    • Depression    • Diabetes ([facility])    • Difficulty swallowing    • Enterococcal septicemia ([facility]) 8/12/2017   • Roberto hematuria 1/29/2020   • Frequent urination    • GERD (gastroesophageal reflux disease)    • Sammarinese measles    • Hypertension    • Hypokalemia 2012    controlled with combination of ACE inhibitor or ARB plus spironolactone   • Hypomagnesemia 08/12/2017    etiology uncertain   • Influenza A 1/26/2020   • Insomnia    • Kidney stone    • Leg edema, right 1/22/2020   • 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 Barry   • Normocytic hypochromic anemia 5/20/2017   • NSTEMI (non-ST elevated myocardial infarction) ([facility]) 07/18/2017    complicating UTI with sepsis   • Pain    • Peripheral vascular disease ([facility])    • Polyneuropathy in diabetes(357.2) 9/11/2013   • Renal mass 1/21/2020   • Septic shock ([facility]) 5/20/2017   • Skin ulcer of calf ([facility]) 2015    Right, Dr. Terry and wound care   • Stroke ([facility]) 2016    left sided weakness   • Urinary bladder disorder    • Urinary incontinence    • Weakness    • Wound of left leg 2012    Requiring surgery and debridment, Dr. Moore       Family History   Problem Relation Age of Onset   • Heart Disease Father         CAD   • Diabetes  Father    • Cancer Mother    • Psychiatric Illness Mother         Depression   • Depression Mother    • Kidney stones Brother    • Heart Disease Brother    • Psychiatric Illness Brother         Depression   • Depression Brother    • Suicide Attempts Other    • Psychiatric Illness Other         autism       Social History     Socioeconomic History   • Marital status:      Spouse name: Not on file   • Number of children: Not on file   • Years of education: Not on file   • Highest education level: Not on file   Occupational History   • Not on file   Tobacco Use   • Smoking status: Never Smoker   • Smokeless tobacco: Never Used   • Tobacco comment: continued abstinence   Substance and Sexual Activity   • Alcohol use: Never   • Drug use: Never   • Sexual activity: Not Currently     Partners: Female     Comment: , one daughter, 2 grands   Other Topics Concern   •  Service Yes   • Blood Transfusions No   • Caffeine Concern No   • Occupational Exposure No   • Hobby Hazards No   • Sleep Concern Yes   • Stress Concern No   • Weight Concern No   • Special Diet No   • Back Care No   • Exercise Yes   • Bike Helmet No   • Seat Belt Yes   • Self-Exams Yes   Social History Narrative    Av is from [place], [place] and raised in [place]. He has been in [place] since 2004. 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  He has been  to Usha since 2003, they met through a mutual friend. He has a daughter (Kalina) and a step son (Jimi).     Social Determinants of Health     Financial Resource Strain:    • Difficulty of Paying Living Expenses:    Food Insecurity:    • Worried About Running Out of Food in the Last Year:    • Ran Out of Food in the Last Year:    Transportation Needs:     • Lack of Transportation (Medical):    • Lack of Transportation (Non-Medical):    Physical Activity:    • Days of Exercise per Week:    • Minutes of Exercise per Session:    Stress:    • Feeling of Stress :    Social Connections:    • Frequency of Communication with Friends and Family:    • Frequency of Social Gatherings with Friends and Family:    • Attends Congregation Services:    • Active Member of Clubs or Organizations:    • Attends Club or Organization Meetings:    • Marital Status:    Intimate Partner Violence:    • Fear of Current or Ex-Partner:    • Emotionally Abused:    • Physically Abused:    • Sexually Abused:           Objective:     /90 Comment: pt reported  Pulse (!) 58 Comment: pt reported  Temp 36.3 °C (97.3 °F) Comment: pt reported  Ht 1.829 m (6') Comment: pt reported  Wt 90.7 kg (200 lb) Comment: pt reports  BMI 27.12 kg/m²          Physical Exam  Vitals and nursing note reviewed.   Constitutional:       General: He is not in acute distress.     Appearance: Normal appearance. He is well-developed. He is not diaphoretic.   HENT:      Head: Normocephalic and atraumatic.      Nose: Nose normal.      Mouth/Throat:      Mouth: Mucous membranes are moist.      Pharynx: Oropharynx is clear.   Eyes:      General: No scleral icterus.     Extraocular Movements: Extraocular movements intact.      Conjunctiva/sclera: Conjunctivae normal.      Pupils: Pupils are equal, round, and reactive to light.   Cardiovascular:      Rate and Rhythm: Normal rate and regular rhythm.      Pulses: Normal pulses.      Heart sounds: Normal heart sounds.   Pulmonary:      Effort: Pulmonary effort is normal. No respiratory distress.      Breath sounds: Normal breath sounds. No wheezing or rhonchi.   Abdominal:      General: Bowel sounds are normal. There is no distension.      Palpations: Abdomen is soft. There is no mass.      Tenderness: There is no abdominal tenderness. There is no left CVA tenderness or  guarding.   Musculoskeletal:      Cervical back: Normal range of motion and neck supple.      Right lower leg: No edema.      Left lower leg: No edema.   Skin:     General: Skin is warm.      Coloration: Skin is not jaundiced.      Findings: No erythema or rash.   Neurological:      General: No focal deficit present.      Mental Status: He is alert and oriented to person, place, and time.      Cranial Nerves: No cranial nerve deficit.      Coordination: Coordination normal.   Psychiatric:         Mood and Affect: Mood normal.         Behavior: Behavior normal.         Thought Content: Thought content normal.         Judgment: Judgment normal.            Laboratory results reviewed: d/w pt    Lab Results   Component Value Date/Time    CREATININE 1.33 02/25/2021 11:25 AM    CREATININE 1.4 05/28/2008 05:42 PM    POTASSIUM 3.9 02/25/2021 11:25 AM     Assessment and Plan    1.CKD III - creat stable at baseline -to monitor  2.HTN: elevated SBP -did not take medications this morning -to monitor  3.Electrolytes: well controlled.  4.Anemia: Hb level stable  5.Urinary retention/hydronephrosis -stable-f/u with Urology  6.Volume:well controlled  7.Microalbuminuria due to DM II -improved to normal range -to monitor -on ARB  8.Vit D def: vit D and PTH well controlled -WNL    Recs: continue current treatment             Keep well hydrated             Low Na diet             F/u with Urology             Monitor BP             F/u here in 6 months

## 2021-03-05 ENCOUNTER — HOSPITAL ENCOUNTER (OUTPATIENT)
Facility: MEDICAL CENTER | Age: 76
End: 2021-03-05
Attending: PHYSICIAN ASSISTANT
Payer: MEDICARE

## 2021-03-05 ENCOUNTER — TELEPHONE (OUTPATIENT)
Dept: ENDOCRINOLOGY | Facility: MEDICAL CENTER | Age: 76
End: 2021-03-05

## 2021-03-05 ENCOUNTER — OFFICE VISIT (OUTPATIENT)
Dept: URGENT CARE | Facility: CLINIC | Age: 76
End: 2021-03-05
Payer: MEDICARE

## 2021-03-05 VITALS
DIASTOLIC BLOOD PRESSURE: 76 MMHG | SYSTOLIC BLOOD PRESSURE: 134 MMHG | HEART RATE: 61 BPM | TEMPERATURE: 97.1 F | OXYGEN SATURATION: 99 % | WEIGHT: 200 LBS | RESPIRATION RATE: 16 BRPM | BODY MASS INDEX: 27.09 KG/M2 | HEIGHT: 72 IN

## 2021-03-05 DIAGNOSIS — L02.811 SCALP ABSCESS: ICD-10-CM

## 2021-03-05 DIAGNOSIS — A49.01 STAPH AUREUS INFECTION: ICD-10-CM

## 2021-03-05 PROCEDURE — 99214 OFFICE O/P EST MOD 30 MIN: CPT | Performed by: PHYSICIAN ASSISTANT

## 2021-03-05 PROCEDURE — 87205 SMEAR GRAM STAIN: CPT

## 2021-03-05 PROCEDURE — 87186 SC STD MICRODIL/AGAR DIL: CPT

## 2021-03-05 PROCEDURE — 87070 CULTURE OTHR SPECIMN AEROBIC: CPT

## 2021-03-05 PROCEDURE — 87077 CULTURE AEROBIC IDENTIFY: CPT

## 2021-03-05 ASSESSMENT — ENCOUNTER SYMPTOMS
CHILLS: 0
FEVER: 0

## 2021-03-05 ASSESSMENT — FIBROSIS 4 INDEX: FIB4 SCORE: 1.16

## 2021-03-05 NOTE — TELEPHONE ENCOUNTER
----- Message from HCANELLE Calderon sent at 3/2/2021  2:48 PM PST -----  Regarding: Eye Exam   HI Kellen~    Can you pls request Eye Exam completed 02/03/2021 with Dr. Cox for our records.     Thank you!    RAMON

## 2021-03-05 NOTE — PROGRESS NOTES
Subjective:   Av Bob is a 75 y.o. male who presents today with   Chief Complaint   Patient presents with   • Wound Infection     top of the head x1 wk      Patient's family member is present today.  Wound Infection  This is a new problem. The current episode started in the past 7 days. The problem occurs constantly. The problem has been waxing and waning. Pertinent negatives include no chills or fever. He has tried nothing for the symptoms. The treatment provided no relief.   Patient has had scab to the area and when it comes off pus drains out. No injury or trauma to the area.    PMH:  has a past medical history of ([facility]) Chronic ulcer of right lower extremity with fat layer exposed (12/27/2018), Acute on chronic renal failure ([facility]) (1/21/2020), Acute respiratory failure with hypoxia ([facility]) (5/20/2017), CAD (coronary artery disease), Cancer ([facility]), Cataract, Cerebrovascular accident (CVA) ([facility]) (12/30/2016), Chickenpox, CKD (chronic kidney disease) stage 3, GFR 30-59 ml/min, Controlled gout (2014), Coronary atherosclerosis of native coronary artery, Daytime sleepiness, Depression, Diabetes ([facility]), Difficulty swallowing, Enterococcal septicemia ([facility]) (8/12/2017), Roberto hematuria (1/29/2020), Frequent urination, GERD (gastroesophageal reflux disease), [place] measles, Hypertension, Hypokalemia (2012), Hypomagnesemia (08/12/2017), Influenza A (1/26/2020), Insomnia, Kidney stone, Leg edema, right (1/22/2020), Lymphoma ([facility]) (2/19/2017), Mixed hyperlipidemia, Nephrolithiasis (2006), Normocytic hypochromic anemia (5/20/2017), NSTEMI (non-ST elevated myocardial infarction) ([facility]) (07/18/2017), Pain, Peripheral vascular disease ([facility]), Polyneuropathy in diabetes(357.2) (9/11/2013), Renal mass (1/21/2020), Septic shock ([facility]) (5/20/2017), Skin ulcer of calf ([facility]) (2015), Stroke ([facility]) (2016), Urinary bladder disorder, Urinary incontinence, Weakness, and Wound of left leg (2012). He also has no past medical history  of Suicide attempt (HCC).  MEDS:   Current Outpatient Medications:   •  gabapentin (NEURONTIN) 100 MG Cap, Take 1-3 Capsules by mouth at bedtime as needed (pain)., Disp: 90 capsule, Rfl: 3  •  clopidogrel (PLAVIX) 75 MG Tab, Take 1 Tab by mouth every day., Disp: 90 Tab, Rfl: 3  •  triamcinolone acetonide (KENALOG) 0.1 % Cream, , Disp: , Rfl:   •  glucose blood strip, OneTouch Ultra Blue Test Strip  U TO TEST TID, Disp: , Rfl:   •  Dulaglutide (TRULICITY) 1.5 MG/0.5ML Solution Pen-injector, Inject 0.5 mL under the skin every 7 days., Disp: 6 mL, Rfl: 1  •  fenofibrate (TRICOR) 145 MG Tab, TAKE 1 TABLET BY MOUTH EVERY DAY, Disp: 90 Tab, Rfl: 3  •  insulin lispro (HUMALOG) 100 UNIT/ML Solution Pen-injector injection PEN, INJECT 20 UNITS UNDER THE SKIN AS INSTRUCTED DAILY, Disp: 96 mL, Rfl: 2  •  atorvastatin (LIPITOR) 40 MG Tab, Take 1 Tab by mouth every evening., Disp: 90 Tab, Rfl: 3  •  TOUJEO SOLOSTAR 300 UNIT/ML Solution Pen-injector, 30 Units every day., Disp: , Rfl:   •  allopurinol (ZYLOPRIM) 100 MG Tab, TAKE 1 TABLET BY MOUTH EVERY DAY, Disp: 90 Tab, Rfl: 0  •  Insulin Pen Needle 32 G x 4 mm (BD PEN NEEDLE SWATI U/F), USE FOR INSULIN SHOTS FIVE TIMES DAILY, Disp: 500 Each, Rfl: 3  •  fluoxetine (PROZAC) 40 MG capsule, Take 1 Cap by mouth every day., Disp: 90 Cap, Rfl: 3  •  losartan (COZAAR) 50 MG Tab, Take 1 Tab by mouth 2 Times a Day., Disp: 180 Tab, Rfl: 3  •  metoprolol SR (TOPROL XL) 100 MG TABLET SR 24 HR, Take 1 Tab by mouth every day., Disp: 90 Tab, Rfl: 3  •  amLODIPine (NORVASC) 5 MG Tab, Take 1 Tab by mouth every day., Disp: 90 Tab, Rfl: 3  •  Multiple Vitamin (MULTI VITAMIN MENS PO), Take  by mouth., Disp: , Rfl:   •  potassium chloride (KLOR-CON) 8 MEQ tablet, TAKE 1 TABLET BY MOUTH EVERY DAY, Disp: 90 Tab, Rfl: 3  •  aspirin EC (ECOTRIN) 81 MG Tablet Delayed Response, Take 81 mg by mouth every day., Disp: , Rfl:   •  Probiotic Product (PROBIOTIC DAILY PO), Take 1 Cap by mouth 2 Times a Day., Disp:  , Rfl:   •  magnesium oxide (MAG-OX) 400 MG Tab, Take 400 mg by mouth every day., Disp: , Rfl:   •  finasteride (PROSCAR) 5 MG Tab, Take 1 Tab by mouth every day., Disp: 30 Tab, Rfl: 0  •  alfuzosin (UROXATRAL) 10 MG SR tablet, Take 10 mg by mouth every day., Disp: , Rfl:   •  methenamine hip (HIPPREX) 1 GM Tab, Take 1 g by mouth 2 times a day., Disp: , Rfl:   •  Insulin Glargine (TOUJEO MAX SOLOSTAR) 300 UNIT/ML Solution Pen-injector, Inject 28 Units as instructed every bedtime., Disp: , Rfl:   •  acetaminophen (TYLENOL) 500 MG Tab, Take 1,000 mg by mouth every 6 hours as needed for Mild Pain or Moderate Pain., Disp: , Rfl:   •  Cholecalciferol (VITAMIN D) 2000 units Cap, Take 4,000 Units by mouth 2 Times a Day. Once a day, Disp: , Rfl:   ALLERGIES:   Allergies   Allergen Reactions   • Diphenhydramine Hcl Anxiety     Pt is able to tolerate  Mg benadryl with less anxiety   • Lorazepam Unspecified     Disorientation   • Ciprofloxacin      Rash,stomach ache   • Spironolactone      Acute kidney injury     SURGHX:   Past Surgical History:   Procedure Laterality Date   • LUMBAR TRANSFORAMINAL EPIDURAL STEROID INJECTION Right 11/2/2020    Procedure: INJECTION, STEROID, SPINE, LUMBAR, EPIDURAL, TRANSFORAMINAL APPROACH;  Surgeon: Harpal Bennett M.D.;  [facility];  Service: Pain Management   • CYSTOSCOPY STENT PLACEMENT Left 2/12/2018    Procedure: CYSTOSCOPY  ;  Surgeon: Rey Barry M.D.;  Location: SURGERY [facility];  Service: Urology   • URETEROSCOPY Left 2/12/2018    Procedure: URETEROSCOPY- FLEXIBLE  ;  Surgeon: Rey Barry M.D.;  Location: SURGERY [facility];  Service: Urology   • LASERTRIPSY Left 2/12/2018    Procedure: LASERTRIPSY - LITHO  ;  Surgeon: Rey Barry M.D.;  Location: SURGERY [facility];  Service: Urology   • WOUND CLOSURE GENERAL  4/3/2012    Performed by GERHARD GILBERT at SURGERY SAME DAY [facility] ORS   • [facility] CARDIAC CATH  2009    Stents to LAD, Om    • DAGOBERTO BY LAPAROSCOPY  1998   • ANGIOPLASTY  1997    RCA followed by other stents as noted above.    • [facility] CARDIAC CATH  1997    stent RCA   • CATARACT EXTRACTION WITH IOL      bilateral   • LITHOTRIPSY     • TONSILLECTOMY     • TONSILLECTOMY AND ADENOIDECTOMY       SOCHX:  reports that he has never smoked. He has never used smokeless tobacco. He reports that he does not drink alcohol and does not use drugs.  FH: Reviewed with patient, not pertinent to this visit.       Review of Systems   Constitutional: Negative for chills and fever.   Skin:        Infected abscess to scalp        Objective:   /76   Pulse 61   Temp 36.2 °C (97.1 °F) (Temporal)   Resp 16   Ht 1.829 m (6')   Wt 90.7 kg (200 lb)   SpO2 99%   BMI 27.12 kg/m²   Physical Exam  Vitals and nursing note reviewed.   Constitutional:       General: He is not in acute distress.     Appearance: He is well-developed.   HENT:      Head: Normocephalic and atraumatic.      Right Ear: Hearing normal.      Left Ear: Hearing normal.   Eyes:      Pupils: Pupils are equal, round, and reactive to light.   Cardiovascular:      Rate and Rhythm: Normal rate and regular rhythm.      Heart sounds: Normal heart sounds.   Pulmonary:      Effort: Pulmonary effort is normal.   Musculoskeletal:      Comments: Left sided weakness at baseline secondary to stroke. Patient in wheelchair for ambulation at baseline.    Skin:     General: Skin is warm and dry.             Comments: No surrounding erythema noted to the lesion on the crown of the patient's scalp.  Small indurated lesion with central erythema.   Neurological:      Mental Status: He is alert.      Coordination: Coordination normal.   Psychiatric:         Mood and Affect: Mood normal.     Patient agreeable to attempt at draining the area today.  Area cleaned with alcohol and 2 percent lidocaine was injected approximately 3cc. 18 gauge needle was advanced superficially at the area until small amount of pus was  expressed. Pressure was used to get further pus drained from the area. Small amount in total.  Area was then cleaned and Polysporin and bandage placed today.  Tolerated well by patient.  Assessment/Plan:   Assessment    1. Scalp abscess  - CULTURE WOUND W/ GRAM STAIN; Future  Recommend follow up with dermatology and PCP. He has an appt with PCP he says coming up and will make an appt for dermatology follow up as well. Discussed my concern of  Underlying potential skin cancer vs cyst. Discussed benefits and risks of antibiotics and they elect to await culture before starting on oral abx. No signs of cellulitis at this time. Monitor for any new redness or new symptoms as discussed. Discussed wound care and recommend continued warm compress to the area and changing the bandage at least twice a day. Lesion does have some domed shape to it following drainage which is why I believe it could be a cyst or cancerous skin lesion. Encouraged wound check with any new concerns over next 24 to 48 hours.  Differential diagnosis, natural history, supportive care, and indications for immediate follow-up discussed.   Patient given instructions and understanding of medications and treatment.    If not improving in 3-5 days, F/U with PCP or return to  if symptoms worsen.    Patient agreeable to plan.      Please note that this dictation was created using voice recognition software. I have made every reasonable attempt to correct obvious errors, but I expect that there are errors of grammar and possibly content that I did not discover before finalizing the note.    Joe Shea PA-C

## 2021-03-06 LAB
GRAM STN SPEC: NORMAL
SIGNIFICANT IND 70042: NORMAL
SITE SITE: NORMAL
SOURCE SOURCE: NORMAL

## 2021-03-08 LAB
BACTERIA WND AEROBE CULT: ABNORMAL
BACTERIA WND AEROBE CULT: ABNORMAL
GRAM STN SPEC: ABNORMAL
SIGNIFICANT IND 70042: ABNORMAL
SITE SITE: ABNORMAL
SOURCE SOURCE: ABNORMAL

## 2021-03-08 RX ORDER — SULFAMETHOXAZOLE AND TRIMETHOPRIM 800; 160 MG/1; MG/1
1 TABLET ORAL 2 TIMES DAILY
Qty: 10 TABLET | Refills: 0 | Status: SHIPPED | OUTPATIENT
Start: 2021-03-08 | End: 2021-03-13

## 2021-03-15 ENCOUNTER — OFFICE VISIT (OUTPATIENT)
Dept: MEDICAL GROUP | Facility: PHYSICIAN GROUP | Age: 76
End: 2021-03-15
Payer: MEDICARE

## 2021-03-15 VITALS
TEMPERATURE: 97.6 F | OXYGEN SATURATION: 97 % | WEIGHT: 200 LBS | BODY MASS INDEX: 27.09 KG/M2 | HEIGHT: 72 IN | HEART RATE: 68 BPM | DIASTOLIC BLOOD PRESSURE: 62 MMHG | SYSTOLIC BLOOD PRESSURE: 124 MMHG

## 2021-03-15 DIAGNOSIS — E11.69 TYPE 2 DIABETES MELLITUS WITH OTHER SPECIFIED COMPLICATION, WITH LONG-TERM CURRENT USE OF INSULIN (HCC): ICD-10-CM

## 2021-03-15 DIAGNOSIS — G47.33 OSA (OBSTRUCTIVE SLEEP APNEA): ICD-10-CM

## 2021-03-15 DIAGNOSIS — R13.10 DYSPHAGIA, UNSPECIFIED TYPE: ICD-10-CM

## 2021-03-15 DIAGNOSIS — Z79.4 TYPE 2 DIABETES MELLITUS WITH OTHER SPECIFIED COMPLICATION, WITH LONG-TERM CURRENT USE OF INSULIN (HCC): ICD-10-CM

## 2021-03-15 PROCEDURE — 99215 OFFICE O/P EST HI 40 MIN: CPT | Performed by: INTERNAL MEDICINE

## 2021-03-15 RX ORDER — DULAGLUTIDE 1.5 MG/.5ML
1 INJECTION, SOLUTION SUBCUTANEOUS
Qty: 6 ML | Refills: 1 | Status: SHIPPED | OUTPATIENT
Start: 2021-03-15 | End: 2021-03-18

## 2021-03-15 ASSESSMENT — PATIENT HEALTH QUESTIONNAIRE - PHQ9
7. TROUBLE CONCENTRATING ON THINGS, SUCH AS READING THE NEWSPAPER OR WATCHING TELEVISION: NOT AT ALL
5. POOR APPETITE OR OVEREATING: NOT AT ALL
9. THOUGHTS THAT YOU WOULD BE BETTER OFF DEAD, OR OF HURTING YOURSELF: NOT AT ALL
1. LITTLE INTEREST OR PLEASURE IN DOING THINGS: NOT AT ALL
6. FEELING BAD ABOUT YOURSELF - OR THAT YOU ARE A FAILURE OR HAVE LET YOURSELF OR YOUR FAMILY DOWN: NOT AL ALL
2. FEELING DOWN, DEPRESSED, IRRITABLE, OR HOPELESS: NOT AT ALL
SUM OF ALL RESPONSES TO PHQ QUESTIONS 1-9: 6
SUM OF ALL RESPONSES TO PHQ9 QUESTIONS 1 AND 2: 0
3. TROUBLE FALLING OR STAYING ASLEEP OR SLEEPING TOO MUCH: NEARLY EVERY DAY
4. FEELING TIRED OR HAVING LITTLE ENERGY: NEARLY EVERY DAY
8. MOVING OR SPEAKING SO SLOWLY THAT OTHER PEOPLE COULD HAVE NOTICED. OR THE OPPOSITE, BEING SO FIGETY OR RESTLESS THAT YOU HAVE BEEN MOVING AROUND A LOT MORE THAN USUAL: NOT AT ALL

## 2021-03-15 ASSESSMENT — FIBROSIS 4 INDEX: FIB4 SCORE: 1.16

## 2021-03-15 NOTE — ASSESSMENT & PLAN NOTE
New and decompensated problem.  We will plan to perform modified barium swallow study for additional evaluation.  May need a referral for speech-language pathology evaluation or upper endoscopy by gastroenterology based on the findings.

## 2021-03-15 NOTE — ASSESSMENT & PLAN NOTE
Chronic and ongoing problem.  A1c has increased slightly but he is no longer having episodes of hypoglycemia.  He recently saw the nurse practitioner with endocrinology and Trulicity was increased to 3 mg/week.  Continue Toujeo and Humalog at current dosage.

## 2021-03-15 NOTE — PROGRESS NOTES
Subjective:   Chief Complaint/History of Present Illness:  Av Bob is a 75 y.o. male established patient who presents today to discuss medical problems as listed below. Av is accompanied by his wife, Usha.    Problem   Type 2 Diabetes Mellitus, With Long-Term Current Use of Insulin ([facility])       Ref. Range 6/19/2020 08:14 2/25/2021 11:25   Glycohemoglobin Latest Ref Range: 4.0 - 5.6 % 5.8 (H) 7.2 (H)   Estim. Avg Glu Latest Units: mg/dL 120 160     Longstanding history of type 2 diabetes on insulin.  Previously followed with endocrinology, Dr. Rasheed.     Current regimen includes Trulicity 3 mg weekly,Toujeo 28 units daily, Humalog 5 units for sugars between 101 50, increase by 2 units if greater than 150.  Correction dosage is 2: 50 greater than 150.           Dysphagia    He reports progressive worsening of his swallow function.  He notices at least once per week he is choking and coughing, can be with pills or can be with food.  The episodes are quite frightening and he takes a bit of time for him to recover.  He has a prior history of stroke and recalls working with speech-language pathology at that time but does not remember if he did any formal esophagrams or swallow studies.  He would be interested in completing a modified barium swallow study to better characterize the cause of his swallowing difficulty.  No odynophagia.  No melena or hematochezia noted.  No concerning medicines for pill esophagitis.     Velasquez (Obstructive Sleep Apnea)    Has a history of sleep apnea on a BiPAP machine.  His wife notes that he is not using it consistently.  The patient reports that he had a infection on the back of his scalp and he was worried the straps were in a rub on it.  Does not give me a clear reason why he does not want to use it.         Current Medications:  Current Outpatient Medications Ordered in Epic   Medication Sig Dispense Refill   • Dulaglutide (TRULICITY) 1.5 MG/0.5ML Solution Pen-injector  Inject 1 mL under the skin every 7 days. 6 mL 1   • gabapentin (NEURONTIN) 100 MG Cap Take 1-3 Capsules by mouth at bedtime as needed (pain). 90 capsule 3   • clopidogrel (PLAVIX) 75 MG Tab Take 1 Tab by mouth every day. 90 Tab 3   • glucose blood strip OneTouch Ultra Blue Test Strip   U TO TEST TID     • fenofibrate (TRICOR) 145 MG Tab TAKE 1 TABLET BY MOUTH EVERY DAY 90 Tab 3   • insulin lispro (HUMALOG) 100 UNIT/ML Solution Pen-injector injection PEN INJECT 20 UNITS UNDER THE SKIN AS INSTRUCTED DAILY 96 mL 2   • atorvastatin (LIPITOR) 40 MG Tab Take 1 Tab by mouth every evening. 90 Tab 3   • TOUJEO SOLOSTAR 300 UNIT/ML Solution Pen-injector 30 Units every day.     • allopurinol (ZYLOPRIM) 100 MG Tab TAKE 1 TABLET BY MOUTH EVERY DAY 90 Tab 0   • Insulin Pen Needle 32 G x 4 mm (BD PEN NEEDLE SWATI U/F) USE FOR INSULIN SHOTS FIVE TIMES DAILY 500 Each 3   • fluoxetine (PROZAC) 40 MG capsule Take 1 Cap by mouth every day. 90 Cap 3   • losartan (COZAAR) 50 MG Tab Take 1 Tab by mouth 2 Times a Day. 180 Tab 3   • metoprolol SR (TOPROL XL) 100 MG TABLET SR 24 HR Take 1 Tab by mouth every day. 90 Tab 3   • amLODIPine (NORVASC) 5 MG Tab Take 1 Tab by mouth every day. 90 Tab 3   • Multiple Vitamin (MULTI VITAMIN MENS PO) Take  by mouth.     • potassium chloride (KLOR-CON) 8 MEQ tablet TAKE 1 TABLET BY MOUTH EVERY DAY 90 Tab 3   • aspirin EC (ECOTRIN) 81 MG Tablet Delayed Response Take 81 mg by mouth every day.     • Probiotic Product (PROBIOTIC DAILY PO) Take 1 Cap by mouth 2 Times a Day.     • magnesium oxide (MAG-OX) 400 MG Tab Take 400 mg by mouth every day.     • finasteride (PROSCAR) 5 MG Tab Take 1 Tab by mouth every day. 30 Tab 0   • alfuzosin (UROXATRAL) 10 MG SR tablet Take 10 mg by mouth every day.     • methenamine hip (HIPPREX) 1 GM Tab Take 1 g by mouth 2 times a day.     • Insulin Glargine (TOUJEO MAX SOLOSTAR) 300 UNIT/ML Solution Pen-injector Inject 28 Units as instructed every bedtime.     • acetaminophen  (TYLENOL) 500 MG Tab Take 1,000 mg by mouth every 6 hours as needed for Mild Pain or Moderate Pain.     • Cholecalciferol (VITAMIN D) 2000 units Cap Take 4,000 Units by mouth 2 Times a Day. Once a day     • triamcinolone acetonide (KENALOG) 0.1 % Cream        No current Epic-ordered facility-administered medications on file.          Objective:   Physical Exam:    Vitals: /62 (BP Location: Right arm, Patient Position: Sitting, BP Cuff Size: Adult)   Pulse 68   Temp 36.4 °C (97.6 °F) (Temporal)   Ht 1.829 m (6')   Wt 90.7 kg (200 lb)   SpO2 97%    BMI: Body mass index is 27.12 kg/m².  Physical Exam   Constitutional:   Chronically ill appearing   HENT:   Healing scabbed lesion on posterior scalp.   Eyes: Conjunctivae are normal.   Cardiovascular: Normal rate and regular rhythm.   Pulmonary/Chest: Effort normal and breath sounds normal. No respiratory distress.   Decreased aeration at left base   Musculoskeletal:         General: No edema.   Neurological:   Hemiplegia   Skin: Skin is warm and dry.   Healing scalp lesion   Psychiatric: Affect and judgment normal.             Assessment and Plan:   Av is a 75 y.o. male with the following:  Problem List Items Addressed This Visit     ASHLEY (obstructive sleep apnea)     Chronic and ongoing problem.  Encourage patient to use his BiPAP machine more consistently otherwise the insurance will not continue to cover it.  Discussed challenges with untreated sleep apnea including chronic hypoxia, fatigue, and worsening function of the brain and heart.  He voiced understanding will try to use it more often. Continue follow up with sleep medicine as recommended.         Dysphagia     New and decompensated problem.  We will plan to perform modified barium swallow study for additional evaluation.  May need a referral for speech-language pathology evaluation or upper endoscopy by gastroenterology based on the findings.         Relevant Orders    DX-ESOPHAGUS - [id number]     Type 2 diabetes mellitus, with long-term current use of insulin (HCC)     Chronic and ongoing problem.  A1c has increased slightly but he is no longer having episodes of hypoglycemia.  He recently saw the nurse practitioner with endocrinology and Trulicity was increased to 3 mg/week.  Continue Toujeo and Humalog at current dosage.         Relevant Medications    Dulaglutide (TRULICITY) 1.5 MG/0.5ML Solution Pen-injector             RTC: Return in about 4 months (around 7/15/2021).    I spent a total of 40 minutes with record review, exam, communication with the patient, communication with other providers, and documentation of this encounter.    PLEASE NOTE: This dictation was created using voice recognition software. I have made every reasonable attempt to correct obvious errors, but I expect that there are errors of grammar and possibly content that I did not discover before finalizing the note.      Madison Bunch, DO  Geriatric and Internal Medicine  [facility]

## 2021-03-15 NOTE — ASSESSMENT & PLAN NOTE
Chronic and ongoing problem.  Encourage patient to use his BiPAP machine more consistently otherwise the insurance will not continue to cover it.  Discussed challenges with untreated sleep apnea including chronic hypoxia, fatigue, and worsening function of the brain and heart.  He voiced understanding will try to use it more often. Continue follow up with sleep medicine as recommended.

## 2021-03-18 DIAGNOSIS — E11.69 TYPE 2 DIABETES MELLITUS WITH OTHER SPECIFIED COMPLICATION, WITH LONG-TERM CURRENT USE OF INSULIN (HCC): ICD-10-CM

## 2021-03-18 DIAGNOSIS — Z79.4 TYPE 2 DIABETES MELLITUS WITH OTHER SPECIFIED COMPLICATION, WITH LONG-TERM CURRENT USE OF INSULIN (HCC): ICD-10-CM

## 2021-03-18 NOTE — TELEPHONE ENCOUNTER
Received request via: Pharmacy    Was the patient seen in the last year in this department? Yes    Does the patient have an active prescription (recently filled or refills available) for medication(s) requested? No       Reorder of Trulicity at 3mg instead of 1.5mg

## 2021-03-22 ENCOUNTER — OFFICE VISIT (OUTPATIENT)
Dept: SLEEP MEDICINE | Facility: MEDICAL CENTER | Age: 76
End: 2021-03-22
Payer: MEDICARE

## 2021-03-22 ENCOUNTER — TELEPHONE (OUTPATIENT)
Dept: MEDICAL GROUP | Facility: PHYSICIAN GROUP | Age: 76
End: 2021-03-22

## 2021-03-22 VITALS
HEIGHT: 72 IN | RESPIRATION RATE: 16 BRPM | DIASTOLIC BLOOD PRESSURE: 72 MMHG | SYSTOLIC BLOOD PRESSURE: 122 MMHG | WEIGHT: 195 LBS | BODY MASS INDEX: 26.41 KG/M2 | OXYGEN SATURATION: 97 % | HEART RATE: 69 BPM

## 2021-03-22 DIAGNOSIS — G47.33 OSA (OBSTRUCTIVE SLEEP APNEA): ICD-10-CM

## 2021-03-22 PROCEDURE — 99213 OFFICE O/P EST LOW 20 MIN: CPT | Performed by: FAMILY MEDICINE

## 2021-03-22 ASSESSMENT — FIBROSIS 4 INDEX: FIB4 SCORE: 1.16

## 2021-03-22 NOTE — PROGRESS NOTES
[facility] Follow Up Note     Date: 3/22/2021 / Time: 10:47 AM    Patient ID:   Name:             Av Bob   YOB: 1945  Age:                 75 y.o.  male   MRN:               [id number]      Thank you for requesting a sleep medicine consultation on Av Bob at the sleep center. He presents today with the chief complaints of ASHLEY follow up. PMH includes DM II, CAD, CVA in 2017 left hemiparesis, Afib, gout nephrosis, PVD, CKD stage III, non-Hodgkin's lymphoma in 2017,, BPH, chronic fatigue, chronic gout, GERD, nocturnal hypoxemia, neurogenic bladder, frequent UTIs, wheelchair dependent,    HISTORY OF PRESENT ILLNESS:       Pt is currently on BiPAP 12/8 cm. He continues to have trouble sleeping at night due to chronic pan. He goes to sleep around 12 am and wakes up around 9-11 am. Overall,  He does not finds his sleep refreshing.  He  Does take multiple naps a day. The naps are usually  min long.He denies any symptoms of RLS, narcolepsy or any symptoms to suggest parasomnias such as nightmares, sleep walking or acting out of dreams.      He is using CPAP most days of the week. Pt reports 1 hrs of average nightly use of CPAP. Pt denies snoring, gasping,choking.Pt also denies significant mask leak that is interfering with sleep. The 30 day compliance was downloaded which shows adequate compliance with more that 4 hr usage about 27%. The AHI is has improved to 26.8/hr. The mask leak is normal. He had hard time using due to the mas leak with the initial mask. He has been using his CPAP only for his daytime naps.       SLEEP HISTORY   PSG from 10/10/20 indicated severe OAS with AHI of 81/hr and O2 bartolo 68%. Sleep related hypoxia. PVC. The apnea index was 50.51 per hour and the hypopnea index was 30.45 per hour resulting in an overall AHI of 80.96. AHI during rem was 0.0 and AHI while supine was 79.76. 10% of the apneas were central apneas. There was a mean  oxygen saturation of 93.0% with a minimum oxygen saturation of 68.0%. Time spent with oxygen saturations below 89% was 80.9 minutes      REVIEW OF SYSTEMS:       Constitutional: Denies fevers, Denies weight changes  Eyes: Denies changes in vision, no eye pain  Ears/Nose/Throat/Mouth: Denies nasal congestion or sore throat   Cardiovascular: Denies chest pain or palpitations   Respiratory: Denies shortness of breath , Denies cough  Gastrointestinal/Hepatic: Denies abdominal pain, nausea, vomiting, diarrhea, constipation or GI bleeding   Genitourinary: Deniesdysuria or frequency  Musculoskeletal/Rheum: Denies  joint pain and swelling   Skin/Breast: Denies rash,   Neurological: Denies headache, confusion, memory loss or focal weakness/parasthesias  Psychiatric: denies mood disorder   Sleep: + insomnia, + EDS    Comprehensive review of systems form is reviewed with the patient and is attached in the EMR.     PMH:  has a past medical history of ([facility]) Chronic ulcer of right lower extremity with fat layer exposed (12/27/2018), Acute on chronic renal failure ([facility]) (1/21/2020), Acute respiratory failure with hypoxia ([facility]) (5/20/2017), CAD (coronary artery disease), Cancer ([facility]), Cataract, Cerebrovascular accident (CVA) ([facility]) (12/30/2016), Chickenpox, CKD (chronic kidney disease) stage 3, GFR 30-59 ml/min, Controlled gout (2014), Coronary atherosclerosis of native coronary artery, Daytime sleepiness, Depression, Diabetes ([facility]), Difficulty swallowing, Enterococcal septicemia ([facility]) (8/12/2017), Roberto hematuria (1/29/2020), Frequent urination, GERD (gastroesophageal reflux disease), Eritrean measles, Hypertension, Hypokalemia (2012), Hypomagnesemia (08/12/2017), Influenza A (1/26/2020), Insomnia, Kidney stone, Leg edema, right (1/22/2020), Lymphoma ([facility]) (2/19/2017), Mixed hyperlipidemia, Nephrolithiasis (2006), Normocytic hypochromic anemia (5/20/2017), NSTEMI (non-ST elevated myocardial infarction) ([facility]) (07/18/2017), Pain,  Peripheral vascular disease (HCC), Polyneuropathy in diabetes(357.2) (9/11/2013), Renal mass (1/21/2020), Septic shock (HCC) (5/20/2017), Skin ulcer of calf ([facility]) (2015), Stroke ([facility]) (2016), Urinary bladder disorder, Urinary incontinence, Weakness, and Wound of left leg (2012). He also has no past medical history of Suicide attempt ([facility]).  MEDS:   Current Outpatient Medications:   •  gabapentin (NEURONTIN) 100 MG Cap, Take 1-3 Capsules by mouth at bedtime as needed (pain)., Disp: 90 capsule, Rfl: 3  •  clopidogrel (PLAVIX) 75 MG Tab, Take 1 Tab by mouth every day., Disp: 90 Tab, Rfl: 3  •  fenofibrate (TRICOR) 145 MG Tab, TAKE 1 TABLET BY MOUTH EVERY DAY, Disp: 90 Tab, Rfl: 3  •  insulin lispro (HUMALOG) 100 UNIT/ML Solution Pen-injector injection PEN, INJECT 20 UNITS UNDER THE SKIN AS INSTRUCTED DAILY, Disp: 96 mL, Rfl: 2  •  atorvastatin (LIPITOR) 40 MG Tab, Take 1 Tab by mouth every evening., Disp: 90 Tab, Rfl: 3  •  TOUJEO SOLOSTAR 300 UNIT/ML Solution Pen-injector, 30 Units every day., Disp: , Rfl:   •  allopurinol (ZYLOPRIM) 100 MG Tab, TAKE 1 TABLET BY MOUTH EVERY DAY, Disp: 90 Tab, Rfl: 0  •  Insulin Pen Needle 32 G x 4 mm (BD PEN NEEDLE SWATI U/F), USE FOR INSULIN SHOTS FIVE TIMES DAILY, Disp: 500 Each, Rfl: 3  •  fluoxetine (PROZAC) 40 MG capsule, Take 1 Cap by mouth every day., Disp: 90 Cap, Rfl: 3  •  losartan (COZAAR) 50 MG Tab, Take 1 Tab by mouth 2 Times a Day., Disp: 180 Tab, Rfl: 3  •  metoprolol SR (TOPROL XL) 100 MG TABLET SR 24 HR, Take 1 Tab by mouth every day., Disp: 90 Tab, Rfl: 3  •  amLODIPine (NORVASC) 5 MG Tab, Take 1 Tab by mouth every day., Disp: 90 Tab, Rfl: 3  •  Multiple Vitamin (MULTI VITAMIN MENS PO), Take  by mouth., Disp: , Rfl:   •  potassium chloride (KLOR-CON) 8 MEQ tablet, TAKE 1 TABLET BY MOUTH EVERY DAY, Disp: 90 Tab, Rfl: 3  •  aspirin EC (ECOTRIN) 81 MG Tablet Delayed Response, Take 81 mg by mouth every day., Disp: , Rfl:   •  Probiotic Product (PROBIOTIC DAILY PO), Take 1  Cap by mouth 2 Times a Day., Disp: , Rfl:   •  magnesium oxide (MAG-OX) 400 MG Tab, Take 400 mg by mouth every day., Disp: , Rfl:   •  finasteride (PROSCAR) 5 MG Tab, Take 1 Tab by mouth every day., Disp: 30 Tab, Rfl: 0  •  alfuzosin (UROXATRAL) 10 MG SR tablet, Take 10 mg by mouth every day., Disp: , Rfl:   •  methenamine hip (HIPPREX) 1 GM Tab, Take 1 g by mouth 2 times a day., Disp: , Rfl:   •  Insulin Glargine (TOUJEO MAX SOLOSTAR) 300 UNIT/ML Solution Pen-injector, Inject 28 Units as instructed every bedtime., Disp: , Rfl:   •  acetaminophen (TYLENOL) 500 MG Tab, Take 1,000 mg by mouth every 6 hours as needed for Mild Pain or Moderate Pain., Disp: , Rfl:   •  Cholecalciferol (VITAMIN D) 2000 units Cap, Take 4,000 Units by mouth 2 Times a Day. Once a day, Disp: , Rfl:   •  Dulaglutide 3 MG/0.5ML Solution Pen-injector, Inject 3 mg under the skin every 7 days., Disp: 0.5 mL, Rfl: 3  •  triamcinolone acetonide (KENALOG) 0.1 % Cream, , Disp: , Rfl:   •  glucose blood strip, OneTouch Ultra Blue Test Strip  U TO TEST TID, Disp: , Rfl:   ALLERGIES:   Allergies   Allergen Reactions   • Diphenhydramine Hcl Anxiety     Pt is able to tolerate  Mg benadryl with less anxiety   • Lorazepam Unspecified     Disorientation   • Ciprofloxacin      Rash,stomach ache   • Spironolactone      Acute kidney injury     SURGHX:   Past Surgical History:   Procedure Laterality Date   • LUMBAR TRANSFORAMINAL EPIDURAL STEROID INJECTION Right 11/2/2020    Procedure: INJECTION, STEROID, SPINE, LUMBAR, EPIDURAL, TRANSFORAMINAL APPROACH;  Surgeon: Harpal Bennett M.D.;  [facility];  Service: Pain Management   • CYSTOSCOPY STENT PLACEMENT Left 2/12/2018    Procedure: CYSTOSCOPY  ;  Surgeon: Rey Barry M.D.;  Location: SURGERY [facility];  Service: Urology   • URETEROSCOPY Left 2/12/2018    Procedure: URETEROSCOPY- FLEXIBLE  ;  Surgeon: Rey Barry M.D.;  Location: SURGERY [facility];  Service: Urology    • LASERTRIPSY Left 2/12/2018    Procedure: LASERTRIPSY - LITHO  ;  Surgeon: Rey Barry M.D.;  Location: SURGERY [facility];  Service: Urology   • WOUND CLOSURE GENERAL  4/3/2012    Performed by GERHARD GILBERT at SURGERY SAME DAY [facility] ORS   • ZZZ CARDIAC CATH  2009    Stents to LAD, Om   • DAGOBERTO BY LAPAROSCOPY  1998   • ANGIOPLASTY  1997    RCA followed by other stents as noted above.    • ZZZ CARDIAC CATH  1997    stent RCA   • CATARACT EXTRACTION WITH IOL      bilateral   • LITHOTRIPSY     • TONSILLECTOMY     • TONSILLECTOMY AND ADENOIDECTOMY       SOCHX:  reports that he has never smoked. He has never used smokeless tobacco. He reports that he does not drink alcohol and does not use drugs..  FH:   Family History   Problem Relation Age of Onset   • Heart Disease Father         CAD   • Diabetes Father    • Cancer Mother    • Psychiatric Illness Mother         Depression   • Depression Mother    • Kidney stones Brother    • Heart Disease Brother    • Psychiatric Illness Brother         Depression   • Depression Brother    • Suicide Attempts Other    • Psychiatric Illness Other         autism         Physical Exam:  Vitals/ General Appearance:   Weight/BMI: Body mass index is 26.45 kg/m².  /72 (BP Location: Right arm, Patient Position: Sitting, BP Cuff Size: Adult)   Pulse 69   Resp 16   Ht 1.829 m (6')   Wt 88.5 kg (195 lb)   SpO2 97%   Vitals:    03/22/21 1030   BP: 122/72   BP Location: Right arm   Patient Position: Sitting   BP Cuff Size: Adult   Pulse: 69   Resp: 16   SpO2: 97%   Weight: 88.5 kg (195 lb)   Height: 1.829 m (6')       Pt. is alert and oriented to time, place and person. Cooperative and in no apparent distress.       Constitutional: Alert, no distress, well-groomed.  Skin: No rashes in visible areas.  Eye: Round. Conjunctiva clear, lids normal. No icterus.   ENMT: Lips pink without lesions, good dentition, moist mucous membranes. Phonation normal.  Neck: No masses, no  thyromegaly. Moves freely without pain.  CV: Pulse as reported by patient  Respiratory: Unlabored respiratory effort, no cough or audible wheeze  Psych: Alert and oriented x3, normal affect and mood.     ASSESSMENT AND PLAN     1. Sleep Apnea      He is urged to avoid supine sleep, weight gain and alcoholic beverages since all of these can worsen sleep apnea. He is cautioned against drowsy driving. If He feels sleepy while driving, He must pull over for a break/nap, rather than persist on the road, in the interest of He own safety and that of others on the road.   Plan   - Continue BiPAP 12/8 cm with FFM   - The AHi is likely due to the lack of use and mask leak. Reassess after he has been consistently using for more than 4hrs per night    - compliance download was reviewed and discussed with the pt   - compliance was reinforced     2. Regarding treatment of other past medical problems and general health maintenance,  He is urged to follow up with PCP.

## 2021-03-22 NOTE — TELEPHONE ENCOUNTER
"Received fax from Harpal Bennett M.D requesting surgical clearance. \"Urgent\".    Placed on PCP desk.  "

## 2021-03-23 ENCOUNTER — TELEPHONE (OUTPATIENT)
Dept: PHYSICAL MEDICINE AND REHAB | Facility: MEDICAL CENTER | Age: 76
End: 2021-03-23

## 2021-03-25 ENCOUNTER — HOSPITAL ENCOUNTER (OUTPATIENT)
Dept: RADIOLOGY | Facility: MEDICAL CENTER | Age: 76
End: 2021-03-25
Attending: INTERNAL MEDICINE
Payer: MEDICARE

## 2021-03-25 NOTE — PREPROCEDURE INSTRUCTIONS
PAT note: PAT call made 03/25/2021 [id number]. Spoke with wife Usha,emergency contact. Health history, allergies, medications and pre-procedure/sedation instructions reviewed with patient. Pre-procedure respiratory screening completed. Pt's wife denies pt being sick/symptomatic(fever, cough, shortness of breath)  within 72 hours or having been in close contact with symptomatic individuals in the past 2 weeks.Pt was informed to contact his/her physician should he/she or any close personal contact become symptomatic prior to the procedure. Pt was told to bring a mask as he/she would be wearing it during the entire stay. It was recommended that the pt self isolate 72 hrs before the procedure and  recommend that his/her  remain on site til pt is d/edgar. Pt verbalized understanding of instructions.

## 2021-03-25 NOTE — PREPROCEDURE INSTRUCTIONS
PAT call made 03/25/2021 at 1339. No answer at number provided. Message with call back number provided. Pt was informed that it is necessary that he/she returns this call to review pre-procedure instructions and to do the pre-screening before 3 PM on Friday (03/26/21), Failure to return this call will result in the appt being rescheduled.

## 2021-03-29 ENCOUNTER — HOSPITAL ENCOUNTER (OUTPATIENT)
Facility: REHABILITATION | Age: 76
End: 2021-03-29
Attending: PHYSICAL MEDICINE & REHABILITATION | Admitting: PHYSICAL MEDICINE & REHABILITATION
Payer: MEDICARE

## 2021-03-29 ENCOUNTER — APPOINTMENT (OUTPATIENT)
Dept: RADIOLOGY | Facility: REHABILITATION | Age: 76
End: 2021-03-29
Attending: PHYSICAL MEDICINE & REHABILITATION
Payer: MEDICARE

## 2021-03-29 VITALS
SYSTOLIC BLOOD PRESSURE: 144 MMHG | BODY MASS INDEX: 26.41 KG/M2 | WEIGHT: 195 LBS | RESPIRATION RATE: 15 BRPM | DIASTOLIC BLOOD PRESSURE: 89 MMHG | HEIGHT: 72 IN | HEART RATE: 69 BPM | OXYGEN SATURATION: 99 % | TEMPERATURE: 97.6 F

## 2021-03-29 PROCEDURE — A9585 GADOBUTROL INJECTION: HCPCS

## 2021-03-29 PROCEDURE — 700111 HCHG RX REV CODE 636 W/ 250 OVERRIDE (IP)

## 2021-03-29 PROCEDURE — 64484 NJX AA&/STRD TFRM EPI L/S EA: CPT

## 2021-03-29 PROCEDURE — 700117 HCHG RX CONTRAST REV CODE 255

## 2021-03-29 PROCEDURE — 64483 NJX AA&/STRD TFRM EPI L/S 1: CPT

## 2021-03-29 RX ORDER — LIDOCAINE HYDROCHLORIDE 10 MG/ML
INJECTION, SOLUTION EPIDURAL; INFILTRATION; INTRACAUDAL; PERINEURAL
Status: COMPLETED
Start: 2021-03-29 | End: 2021-03-29

## 2021-03-29 RX ORDER — GADOBUTROL 604.72 MG/ML
INJECTION INTRAVENOUS
Status: COMPLETED
Start: 2021-03-29 | End: 2021-03-29

## 2021-03-29 RX ORDER — DEXAMETHASONE SODIUM PHOSPHATE 10 MG/ML
INJECTION, SOLUTION INTRAMUSCULAR; INTRAVENOUS
Status: COMPLETED
Start: 2021-03-29 | End: 2021-03-29

## 2021-03-29 RX ORDER — ONDANSETRON 2 MG/ML
4 INJECTION INTRAMUSCULAR; INTRAVENOUS
Status: DISCONTINUED | OUTPATIENT
Start: 2021-03-29 | End: 2021-03-29 | Stop reason: HOSPADM

## 2021-03-29 RX ADMIN — GADOBUTROL 10 ML: 604.72 INJECTION INTRAVENOUS at 10:39

## 2021-03-29 RX ADMIN — DEXAMETHASONE SODIUM PHOSPHATE 20 MG: 10 INJECTION, SOLUTION INTRAMUSCULAR; INTRAVENOUS at 10:39

## 2021-03-29 RX ADMIN — LIDOCAINE HYDROCHLORIDE 10 ML: 10 INJECTION, SOLUTION EPIDURAL; INFILTRATION; INTRACAUDAL; PERINEURAL at 10:39

## 2021-03-29 ASSESSMENT — FIBROSIS 4 INDEX: FIB4 SCORE: 1.16

## 2021-03-29 ASSESSMENT — PAIN DESCRIPTION - PAIN TYPE: TYPE: CHRONIC PAIN

## 2021-03-29 NOTE — H&P
Physical medicine and rehabilitation preprocedure history & Physical Note    Date  3/29/2021    Primary Care Physician  Madison Bunch D.O.      Pre-Op Diagnosis Codes:     * Lumbar radiculopathy [M54.16]     HPI  This is a 75 y.o. male who presented with right low back pain rating down the right leg failed conservative treatments of medication management home exercise program.  He continues to have significant pain and functional deficits.    See previous notes of Dr. Bennett    Past Medical History:   Diagnosis Date   • ([facility]) Chronic ulcer of right lower extremity with fat layer exposed 12/27/2018   • Acute on chronic renal failure ([facility]) 1/21/2020   • Acute respiratory failure with hypoxia ([facility]) 5/20/2017   • CAD (coronary artery disease)     GIOVANNA to RCA in '97, GIOVANNA X2 to LAD and GIOVANNA X2 to OM in '09   • Cancer ([facility])     2017; chemo lympoma   • Cataract    • Cerebrovascular accident (CVA) ([facility]) 12/30/2016    Left arm weakness  etiology of stroke not established, lymphoma discovered on MRI evaluation of stroke, L hemiparesis much worse after acute infectious illness in mid 2017, but no specific diagnosed recurrent neurological etiology, all at [facility]   • Chickenpox    • CKD (chronic kidney disease) stage 3, GFR 30-59 ml/min    • Controlled gout 2014   • Coronary atherosclerosis of native coronary artery     S/P PTCA (percutaneous transluminal coronary angioplasty), RCA, 5/1997, patent on cath 7/10/2009 at the time of interventions on his left anterior descending and circumflex coronary arteries   • Daytime sleepiness    • Depression    • Diabetes ([facility])    • Difficulty swallowing    • Enterococcal septicemia ([facility]) 8/12/2017   • Roberto hematuria 1/29/2020   • Frequent urination    • GERD (gastroesophageal reflux disease)    • Turkmen measles    • Hypertension    • Hypokalemia 2012    controlled with combination of ACE inhibitor or ARB plus spironolactone   • Hypomagnesemia  08/12/2017    etiology uncertain   • Influenza A 1/26/2020   • Insomnia    • Kidney stone    • Leg edema, right 1/22/2020   • 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 Barry   • Normocytic hypochromic anemia 5/20/2017   • NSTEMI (non-ST elevated myocardial infarction) ([facility]) 07/18/2017    complicating UTI with sepsis   • Pain    • Peripheral vascular disease ([facility])    • Polyneuropathy in diabetes(357.2) 9/11/2013   • Renal mass 1/21/2020   • Septic shock ([facility]) 5/20/2017   • Skin ulcer of calf ([facility]) 2015    Right, Dr. Terry and wound care   • Stroke ([facility]) 2016    left sided weakness   • Urinary bladder disorder    • Urinary incontinence    • Weakness    • Wound of left leg 2012    Requiring surgery and debridment, Dr. Moore       Past Surgical History:   Procedure Laterality Date   • LUMBAR TRANSFORAMINAL EPIDURAL STEROID INJECTION Right 11/2/2020    Procedure: INJECTION, STEROID, SPINE, LUMBAR, EPIDURAL, TRANSFORAMINAL APPROACH;  Surgeon: Harpal Bennett M.D.;  Location: [facility];  Service: Pain Management   • CYSTOSCOPY STENT PLACEMENT Left 2/12/2018    Procedure: CYSTOSCOPY  ;  Surgeon: Rey Barry M.D.;  Location: SURGERY [facility];  Service: Urology   • URETEROSCOPY Left 2/12/2018    Procedure: URETEROSCOPY- FLEXIBLE  ;  Surgeon: Rey Barry M.D.;  Location: SURGERY [facility];  Service: Urology   • LASERTRIPSY Left 2/12/2018    Procedure: LASERTRIPSY - LITHO  ;  Surgeon: Rey Barry M.D.;  Location: SURGERY [facility];  Service: Urology   • WOUND CLOSURE GENERAL  4/3/2012    Performed by GERHARD MOORE at SURGERY SAME DAY [facility] ORS   • Z CARDIAC CATH  2009    Stents to LAD, Om   • DAGOBERTO BY LAPAROSCOPY  1998   • ANGIOPLASTY  1997    RCA followed by other stents as noted above.    • ZZZ CARDIAC CATH  1997    stent RCA   • CATARACT EXTRACTION WITH IOL      bilateral   •  LITHOTRIPSY     • TONSILLECTOMY     • TONSILLECTOMY AND ADENOIDECTOMY         Current Facility-Administered Medications   Medication Dose Route Frequency Provider Last Rate Last Admin   • DEXAMETHASONE SOD PHOSPHATE PF 10 MG/ML INJ SOLN            • LIDOCAINE HCL (PF) 1 % INJ SOLN            • IOHEXOL 240 MG/ML INJ SOLN                Social History     Socioeconomic History   • Marital status:      Spouse name: Not on file   • Number of children: Not on file   • Years of education: Not on file   • Highest education level: Not on file   Occupational History   • Not on file   Tobacco Use   • Smoking status: Never Smoker   • Smokeless tobacco: Never Used   • Tobacco comment: continued abstinence   Substance and Sexual Activity   • Alcohol use: Never   • Drug use: Never   • Sexual activity: Not Currently     Partners: Female     Comment: , one daughter, 2 grands   Other Topics Concern   •  Service Yes   • Blood Transfusions No   • Caffeine Concern No   • Occupational Exposure No   • Hobby Hazards No   • Sleep Concern Yes   • Stress Concern No   • Weight Concern No   • Special Diet No   • Back Care No   • Exercise Yes   • Bike Helmet No   • Seat Belt Yes   • Self-Exams Yes   Social History Narrative    Av is from [place], [place] and raised in [place]. He has been in [place] since 2004. 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. He retired in his early 60's.  He has been  to Usha since 2003, they met through a mutual friend. He has a daughter (Kalnia) and a step son (Jimi).     Social Determinants of Health     Financial Resource Strain:    • Difficulty of Paying Living Expenses:    Food Insecurity:    • Worried About Running Out of Food in the Last Year:    • Ran Out of Food in the Last  Year:    Transportation Needs:    • Lack of Transportation (Medical):    • Lack of Transportation (Non-Medical):    Physical Activity:    • Days of Exercise per Week:    • Minutes of Exercise per Session:    Stress:    • Feeling of Stress :    Social Connections:    • Frequency of Communication with Friends and Family:    • Frequency of Social Gatherings with Friends and Family:    • Attends [employer] Services:    • Active Member of Clubs or Organizations:    • Attends Club or Organization Meetings:    • Marital Status:    Intimate Partner Violence:    • Fear of Current or Ex-Partner:    • Emotionally Abused:    • Physically Abused:    • Sexually Abused:        Family History   Problem Relation Age of Onset   • Heart Disease Father         CAD   • Diabetes Father    • Cancer Mother    • Psychiatric Illness Mother         Depression   • Depression Mother    • Kidney stones Brother    • Heart Disease Brother    • Psychiatric Illness Brother         Depression   • Depression Brother    • Suicide Attempts Other    • Psychiatric Illness Other         autism       Allergies  Diphenhydramine hcl, Lorazepam, Ciprofloxacin, and Spironolactone    Review of Systems  Comprehensive review of systems was negative       Physical Exam  Constitutional:       General: He is not in acute distress.     Appearance: Normal appearance. He is well-developed. He is not diaphoretic.   Cardiovascular:      Rate and Rhythm: Normal rate.   Pulmonary:      Effort: Pulmonary effort is normal.      Breath sounds: Normal breath sounds.   Abdominal:      Palpations: Abdomen is soft.   Skin:     General: Skin is warm and dry.      Capillary Refill: Capillary refill takes less than 2 seconds.   Neurological:      Mental Status: He is alert and oriented to person, place, and time.   Psychiatric:         Behavior: Behavior normal.         Thought Content: Thought content normal.         Judgment: Judgment normal.          Vital Signs  Blood Pressure :  (!) 165/98   Temperature: 36.4 °C (97.6 °F)   Pulse: 62   Respiration: 18   Pulse Oximetry: 96 %     Vitals reviewed    Labs:                    Radiology:  DX-PORTABLE FLUOROSCOPY < 1 HOUR    (Results Pending)         Assessment/Plan:  Pre-Op Diagnosis Codes:     * Lumbar radiculopathy [M54.16]  Procedure(s) with comments:  RIGHT L3-4 and L4-5 transforaminal epidural steroid injection with fluoroscopic guidance    Case discussed previously with Dr. Madison Bunch and okay to stop his antiplatelet medications 5 days prior to the procedure above.

## 2021-03-29 NOTE — NON-PROVIDER
Name verified. Consent for procedure and site confirmed and signed.H&P updated.Current medications , allergies reviewed . Printed home care discharged / ASHLEY sheet information protocol / verbal instructions given and  he  verbalized understanding. Pertinent medical health information  reviewed  (ASHLEY on B-PAP, type 2 DM, HTN, CAD, stage 3 CKD, cardiac stents, MI, [place] ) . Confirmed  waiting.Blood sugar taken today in AM  was 80 mg. / dl. Plavix and Aspirin 81 mg off for 5 days. Patient left sided weakness Communicated to Dr. Bennett and JOE Colon RN.

## 2021-03-29 NOTE — OP REPORT
Date of Service: 3/29/2021     Patient: Av Bob 75 y.o. male     MRN: [id number]     Physician/s: Harpal Bennett MD    Pre-operative Diagnosis: Lumbar radiculopathy    Post-operative Diagnosis: Lumbar radiculopathy    Procedure: right Lumbar Transforaminal Epidural Steroid  at the L3-4 and L4-5 levels.     Description of procedure:    The risks, benefits, and alternatives of the procedure were reviewed and discussed with the patient.  Written informed consent was freely obtained. A pre-procedural time-out was conducted by the physician verifying patient’s identity, procedure to be performed, procedure site and side, and allergy verification. Appropriate equipment was determined to be in place for the procedure.         The patient's vital signs were carefully monitored before, throughout, and after the procedure.     In the fluoroscopy suite the patient was placed in a prone position, a pillow placed underneath their umbilicus. The skin was prepped and draped in the usual sterile fashion.     The fluoroscope was placed over the lumbar spine and adjusted into the proper AP/Oblique view to enter the transforaminal space just adjacent to the pedicle at the levels below. The targets for injection were then marked at the right L3-4. A 27g 1.5 inch needle was placed into the marked site, and 1mL of 1% Lidocaine was injected subcutaneously into the epidermal and dermal layers. The needle was removed intact.  A 25g 3.5 inch spinal needle was then placed and advanced under fluoroscopic guidance in an oblique view towards the epidural space of the levels noted above. The needle position was confirmed to not be past the 6 o'clock position in the AP view and it was in the neuroforamen in the lateral view.        The fluoroscope was was then adjusted over the lumbar spine and adjusted into the proper AP/Oblique view to enter the transforaminal space adjacent to the pedicle at the RIGHT  L4-5. A 27g 1.5 inch needle  was placed into the marked site, and 1mL of 1% Lidocaine was injected subcutaneously into the epidermal and dermal layers. The needle was removed intact.  A 25g 3.5 inch spinal needle was then placed and advanced under fluoroscopic guidance in an oblique view towards the epidural space of the levels noted above. The needle position was confirmed to not be past the 6 o'clock position in the AP view and it was in the neuroforamen in the lateral view.       Under live fluoroscopic guidance in the AP view, Gadavist contrast dye was used to highlight the epidural space spread of each level above. Final fluoroscopic images were saved.  Following negative aspiration, 1mL of 1% lidocaine preservative free with 10 mg of dexamethasone was then injected at each level above, and the needles were removed intact after restyleted. The patient's back was covered with a 4x4 gauze, the area was cleansed with sterile normal saline, and a dressing was applied. There were no complications noted.     The patient was then evaluated post-procedure, and was hemodynamically stable prior to leaving the post-operative care unit.     Follow-up as scheduled    Harpal Bennett MD  Physical Medicine and Rehabilitation  Interventional Spine and Sports Physiatry  [facility]          CPT codes  Transforaminal epidural injection- lumbar or sacral (first level):  52036  Transforaminal epidural injection- lumbar or sacral (each additional level):  59012

## 2021-03-29 NOTE — PROGRESS NOTES
Preprocedure assessment completed.  Pertinent health information(allergies benadryl, lorazapam, cipro, spironolactone ) communicated to physician and staff during time out.  Patient positioned preprocedure by RN, CST, and XRAY.  Pillow under ankles for support.

## 2021-03-29 NOTE — NON-PROVIDER
Accompanied  by RN  from procedure room  via wheelchair  .Fluids and food tolerated well.  Reviewed  discharged home care  instruction given and understood by patient. Post procedure evaluated  by Dr. Bennett . Discharged   without difficulty or any deficits with  designated  via wheelchair.

## 2021-03-30 ENCOUNTER — TELEPHONE (OUTPATIENT)
Dept: PHYSICAL MEDICINE AND REHAB | Facility: MEDICAL CENTER | Age: 76
End: 2021-03-30

## 2021-03-30 NOTE — TELEPHONE ENCOUNTER
Spoke to Pt in regards to his SP that was done with Dr. Bennett dated 3/29/21 for  His right L3-4 and L4-5 transforaminal epidural steroid injection with fluoroscopic guidance and he stated that he didn't sleep last night, his legs hurt and today he can't tell if the injection help with his pain.    Thank you  Mariluz

## 2021-04-01 ENCOUNTER — HOSPITAL ENCOUNTER (OUTPATIENT)
Dept: RADIOLOGY | Facility: MEDICAL CENTER | Age: 76
End: 2021-04-01
Attending: INTERNAL MEDICINE
Payer: MEDICARE

## 2021-04-01 DIAGNOSIS — R13.10 DYSPHAGIA, UNSPECIFIED TYPE: ICD-10-CM

## 2021-04-01 DIAGNOSIS — R13.19 ESOPHAGEAL DYSPHAGIA: ICD-10-CM

## 2021-04-01 PROCEDURE — 92611 MOTION FLUOROSCOPY/SWALLOW: CPT

## 2021-04-01 PROCEDURE — 74230 X-RAY XM SWLNG FUNCJ C+: CPT

## 2021-04-01 NOTE — OP THERAPY EVALUATION
[facility]  Speech-Language Pathology Department  Modified Barium Swallow Study Summary    Patient: Av Bob    Date of Evaluation: 4/1/21      Referring Provider:   Madison Bunch D.O.          Fax: [phone]    Patient History/Reason for Referral: Pt was referred for MBS due to concerns for dysphagia, unspecified.      Reported that he has had progressive difficulty with PO intake of solids for the last couple of months where “when I eat too fast, it backs up then I throw up.”  It will just sit at and below suprasternal notch. There is no consistency this regularly occurs with, nor times of days, or environments, etc.  Denies difficulty with pills despite reporting otherwise to MD in March 2021.  Admits to intermittently coughing, but no choking with PO intake.  He is not avoiding any textures.  Has not had any weight loss.  Denies difficulty with liquids.   Mild hoarseness noted for recent vocal changes.  Stated that PPI assists with esophageal complaints, but doesn’t take frequently as he “already takes so many pills.” Reported belching after PO intake more frequently.    Patient with PMHx not limited to CVA, depression, DM II, cancer, GERD, dysphagia, Afib, ASHLEY.      Oral Mechanism Exam: Difficulty following directions intermittently.  -Dentition: natural, missing few teeth   -Labial: WFL  -Lingual: WFL   -Palatal Elevation: WNL   -Laryngeal Elevation: WNL  -Cough: adequate  -Vocal Quality: Hoarseness  - Circumlaryngeal Palpation: WNL    Procedure Results:  The examination was conducted with videofluoroscopy from a   Lateral and Anterior view.  The following textures were presented:   Pudding, Diced Fruit, Cookie and Thin Liquid     Structural Abnormalities: Query CP Bar    The following observations were made:   (WFL unless otherwise noted)    Oral Phase Thin Liquids  Puree Mixed Solid   Lip Closure       Tongue Control During Bolus Hold X      Premature spillage into the  valleculae  X     Premature spillage into the pyriform sinus X  X    Bolus Preparation/ Mastication       Bolus Transport/ Lingual Motion    X  slow   Oral Residue after swallow       Initiation of Pharyngeal Swallow X  Pyriform         Other Observations:   Aware of trace oral residue and initiated secondary swallows independently.         Pharyngeal Phase Thin Liquids  Puree Mixed Solid   Soft Palate Elevation       Laryngeal Elevation       Anterior Hyoid Excursion X X X X   Epiglottic Inversion  X X X X   Laryngeal Vestibular Closure       Pharyngeal Stripping Wave       Pharyngoesophageal Segment Opening (PES) X  Ret/retro X  ret X  ret    Tongue Base Retraction (BOT) and/or BOT Residue       Residue in valleculae       Residue in piriform sinus(es)       Residue on posterior pharyngeal wall (PPW)       Penetration       Aspiration         Other Observations: Trace base of tongue and vallecular residue intermittently.  Initiated secondary swallows independently.  Denied any globus sensation throughout evaluation.  Had delayed clearing after intake.      Penetration Aspiration Scale:   Score of 1: Neither penetration nor aspiration  Score of 2-5: Penetration  Score of 6-8: Aspiration    1: Material does not enter airway  2: Material enters airway, but remains above vocal folds; Ejected from airway; no stasis  3: Material remains above vocal folds; visible stasis remains  4: Material contacts vocal folds, but is ejected; no stasis  5: Material contacts vocal folds, and is not ejected; visible stasis remains  6: Material passes glottis, but is ejected from airway; No visible subglottic stasis  7: Material passes glottis, but is not ejected from airway; visible subglottic stasis despite patient’s response  8: Material passes glottis, and is not ejected; visible subglottic stasis; Absent patient response                          Esophageal Phase: Retention and retrograde flow with liquids and solids at UES.        Impressions:  Patient with no aspiration or penetration with all intake.      Small intake improved timing of initiation, decreased retrograde flow into pharynx, and eliminated residue.  Oropharyngeal residue was trace with all intake which is considered WFL.      No UES retention or retrograde flow was observed to enter in vestibular space despite ascending into pharynx.  Bolus would not always pass through UES before closure and increased residue and retrograde flow would occur.  Query CP bar causing deficits with clearance through UES.       Oropharyngeal swallow WFL.  Suspect age related changes occurring.  When using strategies, he demonstrated tolerance of PO.        Recommendations:   Diet: Regular (IDDSI L 7)    Liquid: Thin (IDDSI L 0)         Medications:   Whole, floated as needed    Compensatory Strategies:   siting up at 90 degrees, remain sitting 30 minutes after meal, small bites/drinks, alternate liquids and solids, avoid problematic foods, and no talking.        Your patient may benefit from a referral for:       1. Follow up with GI to further assess UES dysfunction and treatment options.      Thanks you for the referral.    For further questions, please call [phone].       VINCENZO Banuelos.S., CCC-SLP

## 2021-04-07 NOTE — PROGRESS NOTES
Brayden Melo,    I ordered a video swallow study as Av and Usha are reporting increased frequency of choking episodes that can be quite frightening. The results do not show aspiration/penetration, but it does show upper esophageal sphincter dysfunction with some retrograde movement. Not sure if this would be related to prior stroke or gastroparesis from diabetes? Not sure if you have any tricks up your sleeve or if I should reach out to GI to discuss?    Thanks!    Marylou

## 2021-04-08 ENCOUNTER — OFFICE VISIT (OUTPATIENT)
Dept: PHYSICAL MEDICINE AND REHAB | Facility: REHABILITATION | Age: 76
End: 2021-04-08
Payer: MEDICARE

## 2021-04-08 VITALS
WEIGHT: 195 LBS | TEMPERATURE: 97.6 F | DIASTOLIC BLOOD PRESSURE: 76 MMHG | BODY MASS INDEX: 26.41 KG/M2 | SYSTOLIC BLOOD PRESSURE: 140 MMHG | HEIGHT: 72 IN | RESPIRATION RATE: 18 BRPM | HEART RATE: 68 BPM | OXYGEN SATURATION: 98 %

## 2021-04-08 DIAGNOSIS — K22.9 DISORDER OF UPPER ESOPHAGEAL SPHINCTER: ICD-10-CM

## 2021-04-08 DIAGNOSIS — I69.954 HEMIPLEGIA AND HEMIPARESIS FOLLOWING UNSPECIFIED CEREBROVASCULAR DISEASE AFFECTING LEFT NON-DOMINANT SIDE (HCC): ICD-10-CM

## 2021-04-08 DIAGNOSIS — R25.2 SPASTICITY: ICD-10-CM

## 2021-04-08 DIAGNOSIS — Z86.73 HISTORY OF STROKE: Primary | ICD-10-CM

## 2021-04-08 DIAGNOSIS — G81.94 LEFT HEMIPARESIS (HCC): ICD-10-CM

## 2021-04-08 PROCEDURE — 99215 OFFICE O/P EST HI 40 MIN: CPT | Performed by: PHYSICAL MEDICINE & REHABILITATION

## 2021-04-08 ASSESSMENT — ENCOUNTER SYMPTOMS
BRUISES/BLEEDS EASILY: 0
FEVER: 0
SHORTNESS OF BREATH: 0
CHILLS: 0
COUGH: 0
CONSTIPATION: 0
FOCAL WEAKNESS: 1
DIARRHEA: 0
FALLS: 0
PALPITATIONS: 0
MEMORY LOSS: 0
SORE THROAT: 0

## 2021-04-08 ASSESSMENT — FIBROSIS 4 INDEX: FIB4 SCORE: 1.178511301977579207

## 2021-04-08 NOTE — PROGRESS NOTES
[facility]  PM&R Neuro Rehabilitation Clinic  [facility] [place] [address]  Ph: [phone]    FOLLOW UP OUTPATIENT EVALUATION    Patient Name: Av Bob   Patient : 1945  Patient Age: 75 y.o.     Examining Physician: Dr. Lorie Huff,     PM&R History to Date - Background Information:  Dates of Admission/Discharge to ARU: Larry Williamson - briefly 2017    SUBJECTIVE:   Patient Identification: Av Bob is a 75 y.o. 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    Accompanied by Today: Usha, wife  History of Present Illness:   - Once or twice a week patient will have globus sensation and then vomits. This was happening prior to stroke and now is worse post stroke.   - Had swallow study which showed premature UES closure.   - Has difficulty going out to eat.   - Had TESI right L3-4, 4-5 and isn't as efficacious as the first time. Pain routinely wakes him up at 3AM. Gets severe radiculitis.   - Had been prescribed Gabapentin 100 mg and takes up to 3 at night. If takes 3 gets groggy the next morning. Rarely takes the 3 Gabapentin. Might take 2 more often and also takes extra strength Tylenol   - Av works with an exercise therapist a couple of times per week. Sitting gives him more relief. Has exercises that he is doing that does help the pain.   - Walks with cane with therapist.   - Spasticity still present.     Review of Systems:  Review of Systems   Constitutional: Negative for chills and fever.   HENT: Negative for congestion and sore throat.         + coughing.    Respiratory: Negative for cough and shortness of breath.    Cardiovascular:  Negative for palpitations and leg swelling.   Gastrointestinal: Negative for constipation and diarrhea.   Musculoskeletal: Negative for falls and joint pain.   Neurological: Positive for focal weakness.        + R radiculitis. + spasticity.   Endo/Heme/Allergies: Does not bruise/bleed easily.   Psychiatric/Behavioral: Negative for memory loss.      All other pertinent positive review of systems are noted above in HPI.   All other systems reviewed and are negative.    Past Medical History:  Past Medical History:   Diagnosis Date   • Roberto hematuria 1/29/2020   • Influenza A 1/26/2020   • Leg edema, right 1/22/2020   • Acute on chronic renal failure (HCC) 1/21/2020   • Renal mass 1/21/2020   • ([facility]) Chronic ulcer of right lower extremity with fat layer exposed 12/27/2018   • Enterococcal septicemia ([facility]) 8/12/2017   • Hypomagnesemia 08/12/2017    etiology uncertain   • NSTEMI (non-ST elevated myocardial infarction) ([facility]) 07/18/2017    complicating UTI with sepsis   • Acute respiratory failure with hypoxia ([facility]) 5/20/2017   • Septic shock ([facility]) 5/20/2017   • Normocytic hypochromic anemia 5/20/2017   • Lymphoma ([facility]) 2/19/2017    Large cell   • Cerebrovascular accident (CVA) ([facility]) 12/30/2016    Left arm weakness  etiology of stroke not established, lymphoma discovered on MRI evaluation of stroke, L hemiparesis much worse after acute infectious illness in mid 2017, but no specific diagnosed recurrent neurological etiology, all at [facility]   • Stroke ([facility]) 2016    left sided weakness   • Skin ulcer of calf ([facility]) 2015    Right, Dr. Terry and wound care   • Controlled gout 2014   • Polyneuropathy in diabetes(357.2) 9/11/2013   • Hypokalemia 2012    controlled with combination of ACE inhibitor or ARB plus spironolactone   • Wound of left leg 2012    Requiring surgery and debridment, Dr. Moore   • Nephrolithiasis 2006    right kidney subsequent lithotripsy by Dr. Barry   • CAD (coronary  artery disease)     GIOVANNA to RCA in '97, GIOVANNA X2 to LAD and GIOVANNA X2 to OM in '09   • Cancer (HCC)     2017; chemo lympoma   • Cataract    • Chickenpox    • CKD (chronic kidney disease) stage 3, GFR 30-59 ml/min    • Coronary atherosclerosis of native coronary artery     S/P PTCA (percutaneous transluminal coronary angioplasty), RCA, 5/1997, patent on cath 7/10/2009 at the time of interventions on his left anterior descending and circumflex coronary arteries   • Daytime sleepiness    • Depression    • Diabetes (HCC)    • Difficulty swallowing    • Frequent urination    • GERD (gastroesophageal reflux disease)    • [place] measles    • Hypertension    • Insomnia    • Kidney stone    • Mixed hyperlipidemia    • Pain    • Peripheral vascular disease ([facility])    • Urinary bladder disorder    • Urinary incontinence    • Weakness       Past Surgical History:   Procedure Laterality Date   • LUMBAR TRANSFORAMINAL EPIDURAL STEROID INJECTION Right 3/29/2021    Procedure: RIGHT L3-4 and L4-5 transforaminal epidural steroid injection with fluoroscopic guidance;  Surgeon: Harpal Bennett M.D.;  Location: [facility];  Service: Pain Management   • LUMBAR TRANSFORAMINAL EPIDURAL STEROID INJECTION Right 11/2/2020    Procedure: INJECTION, STEROID, SPINE, LUMBAR, EPIDURAL, TRANSFORAMINAL APPROACH;  Surgeon: Harpal Bennett M.D.;  Location: [facility];  Service: Pain Management   • CYSTOSCOPY STENT PLACEMENT Left 2/12/2018    Procedure: CYSTOSCOPY  ;  Surgeon: Rey Barry M.D.;  Location: SURGERY [facility];  Service: Urology   • URETEROSCOPY Left 2/12/2018    Procedure: URETEROSCOPY- FLEXIBLE  ;  Surgeon: Rey Barry M.D.;  Location: SURGERY [facility];  Service: Urology   • LASERTRIPSY Left 2/12/2018    Procedure: LASERTRIPSY - LITHO  ;  Surgeon: Rey Barry M.D.;  Location: SURGERY [facility];  Service: Urology   • WOUND CLOSURE GENERAL  4/3/2012    Performed by GERHARD GILBERT  at SURGERY SAME DAY [facility]   • [facility] CARDIAC CATH  2009    Stents to LAD, Om   • DAGOBERTO BY LAPAROSCOPY  1998   • ANGIOPLASTY  1997    RCA followed by other stents as noted above.    • [facility] CARDIAC CATH  1997    stent RCA   • CATARACT EXTRACTION WITH IOL      bilateral   • LITHOTRIPSY     • TONSILLECTOMY     • TONSILLECTOMY AND ADENOIDECTOMY          Current Outpatient Medications:   •  Dulaglutide 3 MG/0.5ML Solution Pen-injector, Inject 3 mg under the skin every 7 days., Disp: 0.5 mL, Rfl: 3  •  gabapentin (NEURONTIN) 100 MG Cap, Take 1-3 Capsules by mouth at bedtime as needed (pain)., Disp: 90 capsule, Rfl: 3  •  clopidogrel (PLAVIX) 75 MG Tab, Take 1 Tab by mouth every day., Disp: 90 Tab, Rfl: 3  •  triamcinolone acetonide (KENALOG) 0.1 % Cream, , Disp: , Rfl:   •  glucose blood strip, OneTouch Ultra Blue Test Strip  U TO TEST TID, Disp: , Rfl:   •  fenofibrate (TRICOR) 145 MG Tab, TAKE 1 TABLET BY MOUTH EVERY DAY, Disp: 90 Tab, Rfl: 3  •  insulin lispro (HUMALOG) 100 UNIT/ML Solution Pen-injector injection PEN, INJECT 20 UNITS UNDER THE SKIN AS INSTRUCTED DAILY, Disp: 96 mL, Rfl: 2  •  atorvastatin (LIPITOR) 40 MG Tab, Take 1 Tab by mouth every evening., Disp: 90 Tab, Rfl: 3  •  TOUJEO SOLOSTAR 300 UNIT/ML Solution Pen-injector, 30 Units every day., Disp: , Rfl:   •  allopurinol (ZYLOPRIM) 100 MG Tab, TAKE 1 TABLET BY MOUTH EVERY DAY, Disp: 90 Tab, Rfl: 0  •  Insulin Pen Needle 32 G x 4 mm (BD PEN NEEDLE SWATI U/F), USE FOR INSULIN SHOTS FIVE TIMES DAILY, Disp: 500 Each, Rfl: 3  •  fluoxetine (PROZAC) 40 MG capsule, Take 1 Cap by mouth every day., Disp: 90 Cap, Rfl: 3  •  losartan (COZAAR) 50 MG Tab, Take 1 Tab by mouth 2 Times a Day., Disp: 180 Tab, Rfl: 3  •  metoprolol SR (TOPROL XL) 100 MG TABLET SR 24 HR, Take 1 Tab by mouth every day., Disp: 90 Tab, Rfl: 3  •  amLODIPine (NORVASC) 5 MG Tab, Take 1 Tab by mouth every day., Disp: 90 Tab, Rfl: 3  •  Multiple Vitamin (MULTI VITAMIN MENS PO), Take  by mouth.,  Disp: , Rfl:   •  potassium chloride (KLOR-CON) 8 MEQ tablet, TAKE 1 TABLET BY MOUTH EVERY DAY, Disp: 90 Tab, Rfl: 3  •  aspirin EC (ECOTRIN) 81 MG Tablet Delayed Response, Take 81 mg by mouth every day., Disp: , Rfl:   •  Probiotic Product (PROBIOTIC DAILY PO), Take 1 Cap by mouth 2 Times a Day., Disp: , Rfl:   •  magnesium oxide (MAG-OX) 400 MG Tab, Take 400 mg by mouth every day., Disp: , Rfl:   •  finasteride (PROSCAR) 5 MG Tab, Take 1 Tab by mouth every day., Disp: 30 Tab, Rfl: 0  •  alfuzosin (UROXATRAL) 10 MG SR tablet, Take 10 mg by mouth every day., Disp: , Rfl:   •  methenamine hip (HIPPREX) 1 GM Tab, Take 1 g by mouth 2 times a day., Disp: , Rfl:   •  Insulin Glargine (TOUJEO MAX SOLOSTAR) 300 UNIT/ML Solution Pen-injector, Inject 28 Units as instructed every bedtime., Disp: , Rfl:   •  acetaminophen (TYLENOL) 500 MG Tab, Take 1,000 mg by mouth every 6 hours as needed for Mild Pain or Moderate Pain., Disp: , Rfl:   •  Cholecalciferol (VITAMIN D) 2000 units Cap, Take 4,000 Units by mouth 2 Times a Day. Once a day, Disp: , Rfl:   Allergies   Allergen Reactions   • Diphenhydramine Hcl Anxiety     Pt is able to tolerate  Mg benadryl with less anxiety   • Lorazepam Unspecified     Disorientation   • Ciprofloxacin      Rash,stomach ache   • Spironolactone      Acute kidney injury        Past Social History:  Social History     Socioeconomic History   • Marital status:      Spouse name: Not on file   • Number of children: Not on file   • Years of education: Not on file   • Highest education level: Not on file   Occupational History   • Not on file   Tobacco Use   • Smoking status: Never Smoker   • Smokeless tobacco: Never Used   • Tobacco comment: continued abstinence   Substance and Sexual Activity   • Alcohol use: Never   • Drug use: Never   • Sexual activity: Not Currently     Partners: Female     Comment: , one daughter, 2 grands   Other Topics Concern   •  Service Yes   • Blood  Transfusions No   • Caffeine Concern No   • Occupational Exposure No   • Hobby Hazards No   • Sleep Concern Yes   • Stress Concern No   • Weight Concern No   • Special Diet No   • Back Care No   • Exercise Yes   • Bike Helmet No   • Seat Belt Yes   • Self-Exams Yes   Social History Narrative    Av is from [place], [place] and raised in [place]. He has been in [place] since 2004. 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. He retired in his early 60's.  He has been  to Usha since 2003, they met through a mutual friend. He has a daughter (Kalina) and a step son (Jimi).     Social Determinants of Health     Financial Resource Strain:    • Difficulty of Paying Living Expenses:    Food Insecurity:    • Worried About Running Out of Food in the Last Year:    • Ran Out of Food in the Last Year:    Transportation Needs:    • Lack of Transportation (Medical):    • Lack of Transportation (Non-Medical):    Physical Activity:    • Days of Exercise per Week:    • Minutes of Exercise per Session:    Stress:    • Feeling of Stress :    Social Connections:    • Frequency of Communication with Friends and Family:    • Frequency of Social Gatherings with Friends and Family:    • Attends Synagogue Services:    • Active Member of Clubs or Organizations:    • Attends Club or Organization Meetings:    • Marital Status:    Intimate Partner Violence:    • Fear of Current or Ex-Partner:    • Emotionally Abused:    • Physically Abused:    • Sexually Abused:         Family History:  Family History   Problem Relation Age of Onset   • Heart Disease Father         CAD   • Diabetes Father    • Cancer Mother    • Psychiatric Illness Mother         Depression   • Depression Mother    • Kidney stones Brother    • Heart Disease Brother    •  Psychiatric Illness Brother         Depression   • Depression Brother    • Suicide Attempts Other    • Psychiatric Illness Other         autism       Depression and Opioid Screening  PHQ-9:  Depression Screen (PHQ-2/PHQ-9) 1/7/2021 2/23/2021 3/15/2021   PHQ-2 Total Score - - 0   PHQ-2 Total Score - - -   PHQ-2 Total Score 0 0 -   PHQ-9 Total Score - - 6     Interpretation of PHQ-9 Total Score   Score Severity   1-4 No Depression   5-9 Mild Depression   10-14 Moderate Depression   15-19 Moderately Severe Depression   20-27 Severe Depression     OBJECTIVE:   Vital Signs:  Vitals:    04/08/21 1125   BP: 140/76   Pulse: 68   Resp: 18   Temp: 36.4 °C (97.6 °F)   SpO2: 98%        Pertinent Labs:  Lab Results   Component Value Date/Time    SODIUM 143 02/25/2021 11:25 AM    POTASSIUM 3.9 02/25/2021 11:25 AM    CHLORIDE 107 02/25/2021 11:25 AM    CO2 26 02/25/2021 11:25 AM    GLUCOSE 111 (H) 02/25/2021 11:25 AM    BUN 25 (H) 02/25/2021 11:25 AM    CREATININE 1.33 02/25/2021 11:25 AM    CREATININE 1.4 05/28/2008 05:42 PM    BUNCREATRAT 10 03/27/2017 02:11 PM       Lab Results   Component Value Date/Time    HBA1C 7.2 (H) 02/25/2021 11:25 AM       Lab Results   Component Value Date/Time    WBC 8.1 02/25/2021 11:25 AM    RBC 4.73 02/25/2021 11:25 AM    HEMOGLOBIN 13.7 (L) 02/25/2021 11:25 AM    HEMATOCRIT 42.4 02/25/2021 11:25 AM    MCV 89.6 02/25/2021 11:25 AM    MCH 29.0 02/25/2021 11:25 AM    MCHC 32.3 (L) 02/25/2021 11:25 AM    MPV 10.5 02/25/2021 11:25 AM    NEUTSPOLYS 72.40 (H) 02/25/2021 11:25 AM    LYMPHOCYTES 13.40 (L) 02/25/2021 11:25 AM    MONOCYTES 7.10 02/25/2021 11:25 AM    EOSINOPHILS 5.50 02/25/2021 11:25 AM    BASOPHILS 0.70 02/25/2021 11:25 AM    HYPOCHROMIA 1+ 03/21/2012 01:08 PM       Lab Results   Component Value Date/Time    ASTSGOT 20 02/25/2021 11:25 AM    ALTSGPT 18 02/25/2021 11:25 AM        Physical Exam:   GEN: No apparent distress  HEENT: Head normocephalic, atraumatic.  Sclera nonicteric  bilaterally, no ocular discharge appreciated bilaterally.  Mask donned.  CV: Extremities warm and well-perfused, no peripheral edema appreciated bilaterally.  PULMONARY: Breathing nonlabored on room air, no respiratory accessory muscle use.  Not requiring supplemental oxygen.  ABD: Soft, nontender.  SKIN: No appreciable skin breakdown on exposed areas of skin.  PSYCH: Mood and affect within normal limits.  NEURO: Awake alert.  Conversational.  Logical thought content.    Tone on Modified Deuce Scale    L  L   Shoulder Adduction  Hip Flexion    Elbow Flexion 0 Hip Extension    Elbow Extension 1+ Hip Adduction    Wrist Flexion 3 Knee Extension 3   Finger Flexion 2 Knee Flexion 1     Dorsiflexion 0     Plantar Flexion 1       Imaging:   MBSS 4/1/21  Impressions:  Patient with no aspiration or penetration with all intake.       Small intake improved timing of initiation, decreased retrograde flow into pharynx, and eliminated residue.  Oropharyngeal residue was trace with all intake which is considered WFL.       No UES retention or retrograde flow was observed to enter in vestibular space despite ascending into pharynx.  Bolus would not always pass through UES before closure and increased residue and retrograde flow would occur.  Query CP bar causing deficits with clearance through UES.        Oropharyngeal swallow WFL.  Suspect age related changes occurring.  When using strategies, he demonstrated tolerance of PO.    ASSESSMENT/PLAN: Av Bob  is a 75 y.o. male with rehabilitation history significant for R CVA 12/31/16 with residual left hemiparesis, general debility, here for evaluation. The following plan was discussed with the patient who is in agreement.     Visit Diagnoses     ICD-10-CM   1. History of stroke. followed by Dr Huff with neuro rehab  Z86.73   2. Disorder of upper esophageal sphincter  K22.9   3. Hemiplegia and hemiparesis following unspecified cerebrovascular disease affecting left  non-dominant side (HCC)  I69.954   4. Left hemiparesis (HCC) followed by Dr Huff with neuro rehab  G81.94   5. Spasticity  R25.2      Wife assists with history    Rehab/Neuro:   1. 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  States level of debility is out of proportion for how well he recovered after stroke. Even before norovirus virus, while in chemo was able to go to [place], no assistive device. Can use walker a little bit, but fatigues. Also can walk with cane with exercise  or therapy.  2. General debility: ?  CIP/CIM when acutely hospitalized several years ago with norovirus  3. Fatigue: Secondary to debility vs post stroke fatigue vs sleep apnea versus combination of all of the aforementioned.  Has BiPAP, does not tolerate BiPAP.  Also has low T, followed by endocrinology.  -Med management: On Plavix/ASA for stroke secondary prophylaxis  -Therapy: Continue work with exercise .  Focus on core strengthening to assist with low back pain.    Spasticity: Significant in the left upper extremity, more minimal in the left lower extremity.  Affects wrist flexors primarily.  Concern for hamstring spasticity limiting full range of motion of the left knee extension.    -Med management: Do not recommend pharmacologic agents as all of these can have cognitive side effects and patient is at high risk for polypharmacy.  -Referral: Physical medicine and rehabilitation for Botox injections    Okay to continue anticoagulation with Eliquis and antiplatelet with aspirin through the procedure for botulinum toxin injection.  The risks of going off the medication outweigh the risks of doing the procedure on the medication.  I will plan to use 27-gauge needles and ultrasound guidance to avoid all blood vessels during the procedure.  We discussed that while on anticoagulation the patient  does have an increased risk of bleeding and hematoma     Botox Plan: I plan to inject to the left upper extremity and left lower extremity  Location Botox Amount in Units   Left semimembranosus  75 units   Left semitendinosus  75 units   Left biceps femoris  75 units   Left bicep  75 units   Left FCR  50 units   Left FCU  50 units   Total Units  400 units       The risks benefits and alternatives to this procedure were discussed and the patient wishes to proceed with the procedure. Risks include but are not limited to damage to surrounding structures, infection, bleeding, worsening of pain which can be permanent, weakness which can be permanent. Benefits include pain relief, improved function. Alternatives includes not doing the procedure.      Neuropathic Pain/Nociceptive Pain: Right back/hip/leg pain with cramping/numbness - suspect potential lumbo-sacral radiculitis.  No significant motor deficit on the right.  Last lumbar MRI 2005.  Denies saddle anesthesia but does state have urinary incontinence which he cannot sense, but mostly has sensation of full bladder and bowel.  Complicated by PVD, patient declined angioplasty. MRI revealed diffuse disc bulge at L3-4,4-5. 1/2021 s/p right TESI L3-4, L4-5 with significant improvement.  Recent repeat injection 3/29/2021 which was not as efficacious as the first time.  -Med management: Recommend gabapentin 200 mg nightly  -Therapy: Recommend more core strengthening and general physical activity throughout the day    Coughing/choking with posttussive emesis: MBSS performed 4/1/2021 showing premature closure of UES.  -Referral: GI to evaluate for possible management options of premature UES closure.  Coordinated care with primary care physician.    Follow up: TBD Botox    Total time spent was 45 minutes.  Included in this time is the time spent preparing for the visit including record review, my exam and evaluation, counseling and education regarding that which is  aforementioned in the assessment and plan. Time was spent ordering the appropriate labs, tests, procedures, referrals, medications.  Including this time was also the time spent in care coordination. Included this time as the time spent obtaining history from someone other than the patient. Discussion involved the patient and wife.    Please note that this dictation was created using voice recognition software. I have made every reasonable attempt to correct obvious errors but there may be errors of grammar and content that I may have overlooked prior to finalization of this note.    Dr. Lorie Huff DO, MS  Department of Physical Medicine & Rehabilitation  Neuro Rehabilitation Clinic  [facility]

## 2021-04-10 DIAGNOSIS — E87.6 HYPOKALEMIA: ICD-10-CM

## 2021-04-12 RX ORDER — POTASSIUM CHLORIDE 600 MG/1
TABLET, FILM COATED, EXTENDED RELEASE ORAL
Qty: 90 TABLET | Refills: 2 | Status: SHIPPED | OUTPATIENT
Start: 2021-04-12 | End: 2021-11-09

## 2021-04-19 ENCOUNTER — OFFICE VISIT (OUTPATIENT)
Dept: PHYSICAL MEDICINE AND REHAB | Facility: MEDICAL CENTER | Age: 76
End: 2021-04-19
Payer: MEDICARE

## 2021-04-19 VITALS
BODY MASS INDEX: 27.09 KG/M2 | HEIGHT: 72 IN | HEART RATE: 74 BPM | SYSTOLIC BLOOD PRESSURE: 130 MMHG | WEIGHT: 200 LBS | DIASTOLIC BLOOD PRESSURE: 76 MMHG | OXYGEN SATURATION: 98 % | TEMPERATURE: 96.8 F

## 2021-04-19 DIAGNOSIS — Z79.4 TYPE 2 DIABETES MELLITUS WITH OTHER SPECIFIED COMPLICATION, WITH LONG-TERM CURRENT USE OF INSULIN (HCC): ICD-10-CM

## 2021-04-19 DIAGNOSIS — M79.2 NEUROPATHIC PAIN: ICD-10-CM

## 2021-04-19 DIAGNOSIS — M54.16 LUMBAR RADICULOPATHY: ICD-10-CM

## 2021-04-19 DIAGNOSIS — E11.69 TYPE 2 DIABETES MELLITUS WITH OTHER SPECIFIED COMPLICATION, WITH LONG-TERM CURRENT USE OF INSULIN (HCC): ICD-10-CM

## 2021-04-19 PROCEDURE — 99214 OFFICE O/P EST MOD 30 MIN: CPT | Performed by: PHYSICAL MEDICINE & REHABILITATION

## 2021-04-19 RX ORDER — GABAPENTIN 100 MG/1
100-300 CAPSULE ORAL NIGHTLY PRN
Qty: 90 CAPSULE | Refills: 3 | Status: SHIPPED | OUTPATIENT
Start: 2021-04-19 | End: 2021-08-09

## 2021-04-19 ASSESSMENT — PATIENT HEALTH QUESTIONNAIRE - PHQ9: CLINICAL INTERPRETATION OF PHQ2 SCORE: 0

## 2021-04-19 ASSESSMENT — PAIN SCALES - GENERAL: PAINLEVEL: 6=MODERATE PAIN

## 2021-04-19 ASSESSMENT — FIBROSIS 4 INDEX: FIB4 SCORE: 1.178511301977579207

## 2021-04-19 NOTE — TELEPHONE ENCOUNTER
Received request via: Pharmacy    Was the patient seen in the last year in this department? Yes    Does the patient have an active prescription (recently filled or refills available) for medication(s) requested? No     [facility]

## 2021-04-19 NOTE — PROGRESS NOTES
Follow up patient note  Interventional spine and sports physiatry, Physical medicine rehabilitation      Chief complaint:   Chief Complaint   Patient presents with   • Follow-Up     Back pain          HISTORY    Please see new patient note by Dr Bennett,  for more details.     HPI  Patient identification: Av Bob ,  1945,   With Diagnoses of Lumbar radiculopathy and Neuropathic pain were pertinent to this visit.     Procedures:  2020 right L3-4 and L4-5 transforaminal epidural steroid injection 95% pain relief and follow-up visit  3/29/2021 right L3-4 and L4-5 transforaminal epidural steroid injection    After the above procedure the patient has had a significant provement.  There is more over the leg this time when compared to the previous injection..  He noted that within the past week he has been sleeping through the night with improved pain.  Radiating pain is now less frequent.  The majority of his pain is axial and nonradiating in the right low back and right lateral hip 5 out of 10 in intensity and intermittent pain.  However within the past week he has had significantly improved pain where he is able to do more of his core stretches and exercises at home.  He is using gabapentin 200 mg nightly as needed for his pain.      I also reviewed the most recent note and evaluation done by Dr. Lorie Huff with neuro rehab.  Plans for botulinum toxin injections for the patient's spasticity.     ROS Red Flags :   Fever, Chills, Sweats: Denies  Involuntary Weight Loss: Denies  Bowel/Bladder Incontinence: Denies  Saddle Anesthesia: Denies        PMHx:   Past Medical History:   Diagnosis Date   • ([facility]) Chronic ulcer of right lower extremity with fat layer exposed 2018   • Acute on chronic renal failure ([facility]) 2020   • Acute respiratory failure with hypoxia ([facility]) 2017   • CAD (coronary artery disease)     GIOVANNA to RCA in , GIOVANNA X2 to LAD and GIOVANNA X2 to OM in '   • Cancer  ([facility])     2017; chemo lympoma   • Cataract    • Cerebrovascular accident (CVA) (Former[facility]) 12/30/2016    Left arm weakness  etiology of stroke not established, lymphoma discovered on MRI evaluation of stroke, L hemiparesis much worse after acute infectious illness in mid 2017, but no specific diagnosed recurrent neurological etiology, all at [facility]   • Chickenpox    • CKD (chronic kidney disease) stage 3, GFR 30-59 ml/min    • Controlled gout 2014   • Coronary atherosclerosis of native coronary artery     S/P PTCA (percutaneous transluminal coronary angioplasty), RCA, 5/1997, patent on cath 7/10/2009 at the time of interventions on his left anterior descending and circumflex coronary arteries   • Daytime sleepiness    • Depression    • Diabetes ([facility])    • Difficulty swallowing    • Enterococcal septicemia ([facility]) 8/12/2017   • Roberto hematuria 1/29/2020   • Frequent urination    • GERD (gastroesophageal reflux disease)    • Bengali measles    • Hypertension    • Hypokalemia 2012    controlled with combination of ACE inhibitor or ARB plus spironolactone   • Hypomagnesemia 08/12/2017    etiology uncertain   • Influenza A 1/26/2020   • Insomnia    • Kidney stone    • Leg edema, right 1/22/2020   • 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 Barry   • Normocytic hypochromic anemia 5/20/2017   • NSTEMI (non-ST elevated myocardial infarction) ([facility]) 07/18/2017    complicating UTI with sepsis   • Pain    • Peripheral vascular disease ([facility])    • Polyneuropathy in diabetes(357.2) 9/11/2013   • Renal mass 1/21/2020   • Septic shock ([facility]) 5/20/2017   • Skin ulcer of calf ([facility]) 2015    Right, Dr. Terry and wound care   • Stroke ([facility]) 2016    left sided weakness   • Urinary bladder disorder    • Urinary incontinence    • Weakness    • Wound of left leg 2012    Requiring surgery and debridment, Dr. Moore        PSHx:   Past Surgical History:   Procedure Laterality Date   • LUMBAR TRANSFORAMINAL EPIDURAL STEROID INJECTION Right 3/29/2021    Procedure: RIGHT L3-4 and L4-5 transforaminal epidural steroid injection with fluoroscopic guidance;  Surgeon: Harpal Bennett M.D.;  Location: [facility];  Service: Pain Management   • LUMBAR TRANSFORAMINAL EPIDURAL STEROID INJECTION Right 11/2/2020    Procedure: INJECTION, STEROID, SPINE, LUMBAR, EPIDURAL, TRANSFORAMINAL APPROACH;  Surgeon: Harpal Bennett M.D.;  Location: [facility];  Service: Pain Management   • CYSTOSCOPY STENT PLACEMENT Left 2/12/2018    Procedure: CYSTOSCOPY  ;  Surgeon: Rey Barry M.D.;  Location: SURGERY [facility];  Service: Urology   • URETEROSCOPY Left 2/12/2018    Procedure: URETEROSCOPY- FLEXIBLE  ;  Surgeon: Rey Barry M.D.;  Location: SURGERY [facility];  Service: Urology   • LASERTRIPSY Left 2/12/2018    Procedure: LASERTRIPSY - LITHO  ;  Surgeon: Rey Barry M.D.;  Location: SURGERY [facility];  Service: Urology   • WOUND CLOSURE GENERAL  4/3/2012    Performed by GERHARD GILBERT at SURGERY SAME DAY [facility] ORS   • ZZZ CARDIAC CATH  2009    Stents to LAD, Om   • DAGOBERTO BY LAPAROSCOPY  1998   • ANGIOPLASTY  1997    RCA followed by other stents as noted above.    • ZZZ CARDIAC CATH  1997    stent RCA   • CATARACT EXTRACTION WITH IOL      bilateral   • LITHOTRIPSY     • TONSILLECTOMY     • TONSILLECTOMY AND ADENOIDECTOMY         Family history   Family History   Problem Relation Age of Onset   • Heart Disease Father         CAD   • Diabetes Father    • Cancer Mother    • Psychiatric Illness Mother         Depression   • Depression Mother    • Kidney stones Brother    • Heart Disease Brother    • Psychiatric Illness Brother         Depression   • Depression Brother    • Suicide Attempts Other    • Psychiatric Illness Other         autism         Medications:   Outpatient Medications  Marked as Taking for the 4/19/21 encounter (Office Visit) with Harpal Bennett M.D.   Medication Sig Dispense Refill   • gabapentin (NEURONTIN) 100 MG Cap Take 1-3 Capsules by mouth at bedtime as needed (pain). 90 capsule 3   • potassium chloride (KLOR-CON) 8 MEQ tablet TAKE 1 TABLET BY MOUTH EVERY DAY 90 tablet 2   • Dulaglutide 3 MG/0.5ML Solution Pen-injector Inject 3 mg under the skin every 7 days. 0.5 mL 3   • clopidogrel (PLAVIX) 75 MG Tab Take 1 Tab by mouth every day. 90 Tab 3   • triamcinolone acetonide (KENALOG) 0.1 % Cream      • glucose blood strip OneTouch Ultra Blue Test Strip   U TO TEST TID     • fenofibrate (TRICOR) 145 MG Tab TAKE 1 TABLET BY MOUTH EVERY DAY 90 Tab 3   • insulin lispro (HUMALOG) 100 UNIT/ML Solution Pen-injector injection PEN INJECT 20 UNITS UNDER THE SKIN AS INSTRUCTED DAILY 96 mL 2   • atorvastatin (LIPITOR) 40 MG Tab Take 1 Tab by mouth every evening. 90 Tab 3   • TOUJEO SOLOSTAR 300 UNIT/ML Solution Pen-injector 30 Units every day.     • allopurinol (ZYLOPRIM) 100 MG Tab TAKE 1 TABLET BY MOUTH EVERY DAY 90 Tab 0   • Insulin Pen Needle 32 G x 4 mm (BD PEN NEEDLE SWATI U/F) USE FOR INSULIN SHOTS FIVE TIMES DAILY 500 Each 3   • fluoxetine (PROZAC) 40 MG capsule Take 1 Cap by mouth every day. 90 Cap 3   • losartan (COZAAR) 50 MG Tab Take 1 Tab by mouth 2 Times a Day. 180 Tab 3   • metoprolol SR (TOPROL XL) 100 MG TABLET SR 24 HR Take 1 Tab by mouth every day. 90 Tab 3   • amLODIPine (NORVASC) 5 MG Tab Take 1 Tab by mouth every day. 90 Tab 3   • Multiple Vitamin (MULTI VITAMIN MENS PO) Take  by mouth.     • aspirin EC (ECOTRIN) 81 MG Tablet Delayed Response Take 81 mg by mouth every day.     • Probiotic Product (PROBIOTIC DAILY PO) Take 1 Cap by mouth 2 Times a Day.     • magnesium oxide (MAG-OX) 400 MG Tab Take 400 mg by mouth every day.     • finasteride (PROSCAR) 5 MG Tab Take 1 Tab by mouth every day. 30 Tab 0   • alfuzosin (UROXATRAL) 10 MG SR tablet Take 10 mg by mouth every  day.     • methenamine hip (HIPPREX) 1 GM Tab Take 1 g by mouth 2 times a day.     • Insulin Glargine (TOUJEO MAX SOLOSTAR) 300 UNIT/ML Solution Pen-injector Inject 28 Units as instructed every bedtime.     • acetaminophen (TYLENOL) 500 MG Tab Take 1,000 mg by mouth every 6 hours as needed for Mild Pain or Moderate Pain.     • Cholecalciferol (VITAMIN D) 2000 units Cap Take 4,000 Units by mouth 2 Times a Day. Once a day          Current Outpatient Medications on File Prior to Visit   Medication Sig Dispense Refill   • potassium chloride (KLOR-CON) 8 MEQ tablet TAKE 1 TABLET BY MOUTH EVERY DAY 90 tablet 2   • Dulaglutide 3 MG/0.5ML Solution Pen-injector Inject 3 mg under the skin every 7 days. 0.5 mL 3   • clopidogrel (PLAVIX) 75 MG Tab Take 1 Tab by mouth every day. 90 Tab 3   • triamcinolone acetonide (KENALOG) 0.1 % Cream      • glucose blood strip OneTouch Ultra Blue Test Strip   U TO TEST TID     • fenofibrate (TRICOR) 145 MG Tab TAKE 1 TABLET BY MOUTH EVERY DAY 90 Tab 3   • insulin lispro (HUMALOG) 100 UNIT/ML Solution Pen-injector injection PEN INJECT 20 UNITS UNDER THE SKIN AS INSTRUCTED DAILY 96 mL 2   • atorvastatin (LIPITOR) 40 MG Tab Take 1 Tab by mouth every evening. 90 Tab 3   • TOUJEO SOLOSTAR 300 UNIT/ML Solution Pen-injector 30 Units every day.     • allopurinol (ZYLOPRIM) 100 MG Tab TAKE 1 TABLET BY MOUTH EVERY DAY 90 Tab 0   • Insulin Pen Needle 32 G x 4 mm (BD PEN NEEDLE SWATI U/F) USE FOR INSULIN SHOTS FIVE TIMES DAILY 500 Each 3   • fluoxetine (PROZAC) 40 MG capsule Take 1 Cap by mouth every day. 90 Cap 3   • losartan (COZAAR) 50 MG Tab Take 1 Tab by mouth 2 Times a Day. 180 Tab 3   • metoprolol SR (TOPROL XL) 100 MG TABLET SR 24 HR Take 1 Tab by mouth every day. 90 Tab 3   • amLODIPine (NORVASC) 5 MG Tab Take 1 Tab by mouth every day. 90 Tab 3   • Multiple Vitamin (MULTI VITAMIN MENS PO) Take  by mouth.     • aspirin EC (ECOTRIN) 81 MG Tablet Delayed Response Take 81 mg by mouth every day.     •  Probiotic Product (PROBIOTIC DAILY PO) Take 1 Cap by mouth 2 Times a Day.     • magnesium oxide (MAG-OX) 400 MG Tab Take 400 mg by mouth every day.     • finasteride (PROSCAR) 5 MG Tab Take 1 Tab by mouth every day. 30 Tab 0   • alfuzosin (UROXATRAL) 10 MG SR tablet Take 10 mg by mouth every day.     • methenamine hip (HIPPREX) 1 GM Tab Take 1 g by mouth 2 times a day.     • Insulin Glargine (TOUJEO MAX SOLOSTAR) 300 UNIT/ML Solution Pen-injector Inject 28 Units as instructed every bedtime.     • acetaminophen (TYLENOL) 500 MG Tab Take 1,000 mg by mouth every 6 hours as needed for Mild Pain or Moderate Pain.     • Cholecalciferol (VITAMIN D) 2000 units Cap Take 4,000 Units by mouth 2 Times a Day. Once a day       No current facility-administered medications on file prior to visit.         Allergies:   Allergies   Allergen Reactions   • Diphenhydramine Hcl Anxiety     Pt is able to tolerate  Mg benadryl with less anxiety   • Lorazepam Unspecified     Disorientation   • Ciprofloxacin      Rash,stomach ache   • Spironolactone      Acute kidney injury       Social Hx:   Social History     Socioeconomic History   • Marital status:      Spouse name: Not on file   • Number of children: Not on file   • Years of education: Not on file   • Highest education level: Not on file   Occupational History   • Not on file   Tobacco Use   • Smoking status: Never Smoker   • Smokeless tobacco: Never Used   • Tobacco comment: continued abstinence   Substance and Sexual Activity   • Alcohol use: Never   • Drug use: Never   • Sexual activity: Not Currently     Partners: Female     Comment: , one daughter, 2 grands   Other Topics Concern   •  Service Yes   • Blood Transfusions No   • Caffeine Concern No   • Occupational Exposure No   • Hobby Hazards No   • Sleep Concern Yes   • Stress Concern No   • Weight Concern No   • Special Diet No   • Back Care No   • Exercise Yes   • Bike Helmet No   • Seat Belt Yes   •  Self-Exams Yes   Social History Narrative    Av is from [place], [place] and raised in [place]. He has been in [place] since 2004. 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  He has been  to Usha since 2003, they met through a mutual friend. He has a daughter (Kalina) and a step son (Jimi).     Social Determinants of Health     Financial Resource Strain:    • Difficulty of Paying Living Expenses:    Food Insecurity:    • Worried About Running Out of Food in the Last Year:    • Ran Out of Food in the Last Year:    Transportation Needs:    • Lack of Transportation (Medical):    • Lack of Transportation (Non-Medical):    Physical Activity:    • Days of Exercise per Week:    • Minutes of Exercise per Session:    Stress:    • Feeling of Stress :    Social Connections:    • Frequency of Communication with Friends and Family:    • Frequency of Social Gatherings with Friends and Family:    • Attends Yazdanism Services:    • Active Member of Clubs or Organizations:    • Attends Club or Organization Meetings:    • Marital Status:    Intimate Partner Violence:    • Fear of Current or Ex-Partner:    • Emotionally Abused:    • Physically Abused:    • Sexually Abused:          EXAMINATION     Physical Exam:   Vitals: /76 (BP Location: Right arm, Patient Position: Sitting, BP Cuff Size: Adult)   Pulse 74   Temp 36 °C (96.8 °F) (Temporal)   Ht 1.829 m (6')   Wt 90.7 kg (200 lb)   SpO2 98%     Constitutional:   Body Habitus: Body mass index is 27.12 kg/m².  Cooperation: Fully cooperates with exam  Appearance: Well-groomed no disheveled    Respiratory-  breathing comfortable on room air, no audible wheezing  Cardiovascular- capillary refills less than 2 seconds. No lower extremity  edema is noted.   Psychiatric- alert and oriented ×3. Normal affect.    MSK and Neuro: -  Slump test is negative bilaterally.  Strength is 5 out of 5 on the right lower extremity throughout with sensation intact.  There are no signs of infection over the injection sites.         MEDICAL DECISION MAKING    DATA    Labs:   Lab Results   Component Value Date/Time    SODIUM 143 02/25/2021 11:25 AM    POTASSIUM 3.9 02/25/2021 11:25 AM    CHLORIDE 107 02/25/2021 11:25 AM    CO2 26 02/25/2021 11:25 AM    GLUCOSE 111 (H) 02/25/2021 11:25 AM    BUN 25 (H) 02/25/2021 11:25 AM    CREATININE 1.33 02/25/2021 11:25 AM    CREATININE 1.4 05/28/2008 05:42 PM    BUNCREATRAT 10 03/27/2017 02:11 PM        Lab Results   Component Value Date/Time    PROTHROMBTM 15.3 (H) 01/22/2020 12:35 PM    INR 1.19 (H) 01/22/2020 12:35 PM        Lab Results   Component Value Date/Time    WBC 8.1 02/25/2021 11:25 AM    RBC 4.73 02/25/2021 11:25 AM    HEMOGLOBIN 13.7 (L) 02/25/2021 11:25 AM    HEMATOCRIT 42.4 02/25/2021 11:25 AM    MCV 89.6 02/25/2021 11:25 AM    MCH 29.0 02/25/2021 11:25 AM    MCHC 32.3 (L) 02/25/2021 11:25 AM    MPV 10.5 02/25/2021 11:25 AM    NEUTSPOLYS 72.40 (H) 02/25/2021 11:25 AM    LYMPHOCYTES 13.40 (L) 02/25/2021 11:25 AM    MONOCYTES 7.10 02/25/2021 11:25 AM    EOSINOPHILS 5.50 02/25/2021 11:25 AM    BASOPHILS 0.70 02/25/2021 11:25 AM    HYPOCHROMIA 1+ 03/21/2012 01:08 PM        Lab Results   Component Value Date/Time    HBA1C 7.2 (H) 02/25/2021 11:25 AM          Imaging:   I personally reviewed following images    I reviewed the fluoroscopic images from 11/2/2020 showing successful right L3-4 and L4-5 transforaminal epidural steroid injection.  I reviewed these with the patient at the visit on 11/23/2020.    I reviewed the following radiology reports                                Results for orders placed during the hospital encounter of 09/24/20   MR-LUMBAR SPINE-W/O    Impression 1.  Multifocal degenerative disease in the  lumbar spine as described above.  2.  Bilateral hydronephrosis and hydroureter. This is noted on the previous CT scan dated 2020.  3.  There is small focus of sclerosis in the L4 vertebral body. This is new since the previous study. This finding likely secondary to the degeneration. However if there is a history of carcinoma the possibility of sclerotic metastasis cannot be   excluded.                                                                  Results for orders placed in visit on 20   DX-CHEST-2 VIEWS    Impression No acute cardiopulmonary disease.                                                                                   DIAGNOSIS   Visit Diagnoses     ICD-10-CM   1. Lumbar radiculopathy  M54.16   2. Neuropathic pain  M79.2         ASSESSMENT and PLAN:     Av Landakp  1945 male      Av was seen today for follow-up.    Diagnoses and all orders for this visit:    Lumbar radiculopathy  -     gabapentin (NEURONTIN) 100 MG Cap; Take 1-3 Capsules by mouth at bedtime as needed (pain).    Neuropathic pain  -     gabapentin (NEURONTIN) 100 MG Cap; Take 1-3 Capsules by mouth at bedtime as needed (pain).        Significant provement of the patient's pain after the epidural steroid injection now he is able to do his core strengthening exercises and he is working with his .  He is doing daily exercises and stretches.  Improved sleep after the epidural.  There was a delay before improvement from the steroid effect which can happen after epidural steroid injections.    Follow up: After the above procedure    Thank you for allowing me to participate in the care of this patient. If you have any questions please not hesitate to contact me.             Please note that this dictation was created using voice recognition software. I have made every reasonable attempt to correct obvious errors but there may be errors of grammar and content that I may have overlooked prior  to finalization of this note.      Harpal Bennett MD  Interventional Spine and Sports Physiatry  Physical Medicine and Rehabilitation  [facility]

## 2021-04-20 RX ORDER — DULAGLUTIDE 1.5 MG/.5ML
1 INJECTION, SOLUTION SUBCUTANEOUS
Qty: 6 ML | Refills: 0 | OUTPATIENT
Start: 2021-04-20

## 2021-04-20 NOTE — TELEPHONE ENCOUNTER
He is now on 3 mg dose and this was sent to pharmacy on 3/18/2021. Make sure pharmacy has new increased dose (1.5 mg to 3 mg weekly) and has adequate refills for the next year.

## 2021-04-20 NOTE — TELEPHONE ENCOUNTER
Phone Number Called: [phone]  ([facility])    Call outcome:  Spoke to pharmacist    Message: Pharmacist stated that the Rx for Dulaglutide had been released all at once for the pt (refills included) so pt needed another verbal in at this point. Confirmed that dosage had been updated.

## 2021-04-26 ENCOUNTER — OFFICE VISIT (OUTPATIENT)
Dept: SLEEP MEDICINE | Facility: MEDICAL CENTER | Age: 76
End: 2021-04-26
Payer: MEDICARE

## 2021-04-26 VITALS
SYSTOLIC BLOOD PRESSURE: 122 MMHG | HEART RATE: 69 BPM | OXYGEN SATURATION: 97 % | DIASTOLIC BLOOD PRESSURE: 70 MMHG | WEIGHT: 200 LBS | BODY MASS INDEX: 27.09 KG/M2 | HEIGHT: 72 IN

## 2021-04-26 DIAGNOSIS — I10 ESSENTIAL HYPERTENSION, BENIGN: ICD-10-CM

## 2021-04-26 DIAGNOSIS — G47.33 OSA (OBSTRUCTIVE SLEEP APNEA): ICD-10-CM

## 2021-04-26 DIAGNOSIS — I48.0 PAROXYSMAL ATRIAL FIBRILLATION (HCC): ICD-10-CM

## 2021-04-26 DIAGNOSIS — F51.04 PSYCHOPHYSIOLOGICAL INSOMNIA: ICD-10-CM

## 2021-04-26 PROCEDURE — 99212 OFFICE O/P EST SF 10 MIN: CPT | Performed by: NURSE PRACTITIONER

## 2021-04-26 ASSESSMENT — FIBROSIS 4 INDEX: FIB4 SCORE: 1.178511301977579207

## 2021-04-26 NOTE — PROGRESS NOTES
"Chief Complaint   Patient presents with   • Follow-Up     Apnea-Last seen 03/22/2021       HPI:      Mr. Bob y/o male patient who is in today for ASHLEY f/u. PMH includes DM II, CAD, CVA in 2017 left hemiparesis, Afib, gout nephrosis, PVD, CKD stage III, non-Hodgkin's lymphoma in 2017, Norovirus with sepsis 5 days in ICU, psycho physiological insomnia, BPH, chronic fatigue, chronic gout, GERD, nocturnal hypoxemia, neurogenic bladder, frequent UTIs, wheelchair dependent, never smoker, T&A.    Patient is accompanied by his wife.  Patient's BiPAP machine was repossessed by the DME company last week due to noncompliance.  He denies morning headache or snoring.  Patient's wife reports that he had significant difficulty using the machine at night and would primarily only use it during the day or when he would nap.  Patient does not wish to proceed with therapy at this time nor repeat a sleep study.  If he changes his mind he will call and schedule follow-up appointment. He describes his sleep problem as trouble falling asleep at night and staying asleep. Pain in his back, legs and foot is often one of the reasons that he also has trouble sleeping. He is often awake most of the night and will sleep a lot during the day.  This results in him \"always feeling tired and fatigued,and  lacks energy.  Probably became worse after stroke, and cancer and septic shock in 2017.\" He leaves his TV on at nighttime which he feels allows him to stay asleep.  He is incontinent and wears disposable briefs.  He generally sleeps better in the early morning.  Wife reports that he will often have breakfast and then sleep up to 3 hours. He may fall asleep accidentally sitting at the table.  He will also fall asleep for 3 to 4 hours after eating dinner around 7 PM.  He is a retired  and has always worked varying shift hours.  He follows up with Dr. Tucker, cardiology, for Afib and blood pressure management, he checks his blood " pressure at home and also takes his blood pressure medication as directed. He denies any new health problems or medications.    Sleep Study History:   PSG titration study from 12/5/20 indicated satisfactory bilevel titration to a best pressure of 12/8 cm water with a resultant normalized AHI, a minimum saturation of 89%, a mean saturation of 94-96%, and the achievement of nonsupine REM sleep.     PSG from 10/10/20 indicated severe OAS with AHI of 81/hr and O2 bartolo 68%. Sleep related hypoxia. PVC. The apnea index was 50.51 per hour and the hypopnea index was 30.45 per hour resulting in an overall AHI of 80.96. AHI during rem was 0.0 and AHI while supine was 79.76. 10% of the apneas were central apneas. There was a mean oxygen saturation of 93.0% with a minimum oxygen saturation of 68.0%. Time spent with oxygen saturations below 89% was 80.9 minutes.    OPO in June 2020 showed that saturations were less than or equal to 88% for 132.4 minutes with a bartolo saturation of 59%.  The oximetric pattern was sawtoothed and episodic consistent with ASHLEY.    ROS:    Constitutional: Denies fevers, Denies weight changes  Eyes: Denies changes in vision, no eye pain  Ears/Nose/Throat/Mouth: Denies nasal congestion or sore throat   Cardiovascular: Denies chest pain or palpitations   Respiratory: Denies shortness of breath , Denies cough  Gastrointestinal/Hepatic: Denies abdominal pain, nausea, vomiting,   Skin/Breast: Denies rash,   Neurological: Denies headache, confusion,   Psychiatric: denies mood disorder   Sleep: denies snoring       Past Medical History:   Diagnosis Date   • (HCC) Chronic ulcer of right lower extremity with fat layer exposed 12/27/2018   • Acute on chronic renal failure (HCC) 1/21/2020   • Acute respiratory failure with hypoxia (HCC) 5/20/2017   • CAD (coronary artery disease)     GIOVANNA to RCA in '97, GIOVANNA X2 to LAD and GIOVANNA X2 to OM in '09   • Cancer (HCC)     2017; chemo lympoma   • Cataract    • Cerebrovascular  accident (CVA) (Formerly [facility]) 12/30/2016    Left arm weakness  etiology of stroke not established, lymphoma discovered on MRI evaluation of stroke, L hemiparesis much worse after acute infectious illness in mid 2017, but no specific diagnosed recurrent neurological etiology, all at [facility]   • Chickenpox    • CKD (chronic kidney disease) stage 3, GFR 30-59 ml/min    • Controlled gout 2014   • Coronary atherosclerosis of native coronary artery     S/P PTCA (percutaneous transluminal coronary angioplasty), RCA, 5/1997, patent on cath 7/10/2009 at the time of interventions on his left anterior descending and circumflex coronary arteries   • Daytime sleepiness    • Depression    • Diabetes ([facility])    • Difficulty swallowing    • Enterococcal septicemia ([facility]) 8/12/2017   • Roberto hematuria 1/29/2020   • Frequent urination    • GERD (gastroesophageal reflux disease)    • Guamanian measles    • Hypertension    • Hypokalemia 2012    controlled with combination of ACE inhibitor or ARB plus spironolactone   • Hypomagnesemia 08/12/2017    etiology uncertain   • Influenza A 1/26/2020   • Insomnia    • Kidney stone    • Leg edema, right 1/22/2020   • Lymphoma ([facility]) 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 Barry   • Normocytic hypochromic anemia 5/20/2017   • NSTEMI (non-ST elevated myocardial infarction) ([facility]) 07/18/2017    complicating UTI with sepsis   • Pain    • Peripheral vascular disease ([facility])    • Polyneuropathy in diabetes(357.2) 9/11/2013   • Renal mass 1/21/2020   • Septic shock ([facility]) 5/20/2017   • Skin ulcer of calf ([facility]) 2015    Right, Dr. Terry and wound care   • Stroke ([facility]) 2016    left sided weakness   • Urinary bladder disorder    • Urinary incontinence    • Weakness    • Wound of left leg 2012    Requiring surgery and debridment, Dr. Moore       Past Surgical History:   Procedure Laterality Date   • LUMBAR  TRANSFORAMINAL EPIDURAL STEROID INJECTION Right 3/29/2021    Procedure: RIGHT L3-4 and L4-5 transforaminal epidural steroid injection with fluoroscopic guidance;  Surgeon: Harpal Bennett M.D.;  [facility];  Service: Pain Management   • LUMBAR TRANSFORAMINAL EPIDURAL STEROID INJECTION Right 11/2/2020    Procedure: INJECTION, STEROID, SPINE, LUMBAR, EPIDURAL, TRANSFORAMINAL APPROACH;  Surgeon: Harpal Bennett M.D.;  Location: [facility];  Service: Pain Management   • CYSTOSCOPY STENT PLACEMENT Left 2/12/2018    Procedure: CYSTOSCOPY  ;  Surgeon: Rey Barry M.D.;  Location: SURGERY [facility];  Service: Urology   • URETEROSCOPY Left 2/12/2018    Procedure: URETEROSCOPY- FLEXIBLE  ;  Surgeon: Rey Barry M.D.;  Location: SURGERY [facility];  Service: Urology   • LASERTRIPSY Left 2/12/2018    Procedure: LASERTRIPSY - LITHO  ;  Surgeon: Rey Barry M.D.;  Location: SURGERY [facility];  Service: Urology   • WOUND CLOSURE GENERAL  4/3/2012    Performed by GERHARD GILBERT at SURGERY SAME DAY [facility] ORS   • Z CARDIAC CATH  2009    Stents to LAD, Om   • DAGOBERTO BY LAPAROSCOPY  1998   • ANGIOPLASTY  1997    RCA followed by other stents as noted above.    • ZZZ CARDIAC CATH  1997    stent RCA   • CATARACT EXTRACTION WITH IOL      bilateral   • LITHOTRIPSY     • TONSILLECTOMY     • TONSILLECTOMY AND ADENOIDECTOMY         Family History   Problem Relation Age of Onset   • Heart Disease Father         CAD   • Diabetes Father    • Cancer Mother    • Psychiatric Illness Mother         Depression   • Depression Mother    • Kidney stones Brother    • Heart Disease Brother    • Psychiatric Illness Brother         Depression   • Depression Brother    • Suicide Attempts Other    • Psychiatric Illness Other         autism       Social History     Socioeconomic History   • Marital status:      Spouse name: Not on file   • Number of children: Not on file   •  Years of education: Not on file   • Highest education level: Not on file   Occupational History   • Not on file   Tobacco Use   • Smoking status: Never Smoker   • Smokeless tobacco: Never Used   • Tobacco comment: continued abstinence   Substance and Sexual Activity   • Alcohol use: Never   • Drug use: Never   • Sexual activity: Not Currently     Partners: Female     Comment: , one daughter, 2 grands   Other Topics Concern   •  Service Yes   • Blood Transfusions No   • Caffeine Concern No   • Occupational Exposure No   • Hobby Hazards No   • Sleep Concern Yes   • Stress Concern No   • Weight Concern No   • Special Diet No   • Back Care No   • Exercise Yes   • Bike Helmet No   • Seat Belt Yes   • Self-Exams Yes   Social History Narrative    Av is from [place], [place] and raised in [place]. He has been in [place] since 2004. 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  He has been  to Usha since 2003, they met through a mutual friend. He has a daughter (Kalina) and a step son (Jimi).     Social Determinants of Health     Financial Resource Strain:    • Difficulty of Paying Living Expenses:    Food Insecurity:    • Worried About Running Out of Food in the Last Year:    • Ran Out of Food in the Last Year:    Transportation Needs:    • Lack of Transportation (Medical):    • Lack of Transportation (Non-Medical):    Physical Activity:    • Days of Exercise per Week:    • Minutes of Exercise per Session:    Stress:    • Feeling of Stress :    Social Connections:    • Frequency of Communication with Friends and Family:    • Frequency of Social Gatherings with Friends and Family:    • Attends [employer] Services:    • Active Member of Clubs or Organizations:    • Attends  Club or Organization Meetings:    • Marital Status:    Intimate Partner Violence:    • Fear of Current or Ex-Partner:    • Emotionally Abused:    • Physically Abused:    • Sexually Abused:        Allergies as of 04/26/2021 - Reviewed 04/26/2021   Allergen Reaction Noted   • Diphenhydramine hcl Anxiety 05/08/2017   • Lorazepam Unspecified 03/22/2017   • Ciprofloxacin  10/11/2017   • Spironolactone  10/08/2019        Vitals:  Vitals:    04/26/21 1420   BP: 122/70   Pulse: 69   SpO2: 97%       Current medications as of today   Current Outpatient Medications   Medication Sig Dispense Refill   • gabapentin (NEURONTIN) 100 MG Cap Take 1-3 Capsules by mouth at bedtime as needed (pain). 90 capsule 3   • potassium chloride (KLOR-CON) 8 MEQ tablet TAKE 1 TABLET BY MOUTH EVERY DAY 90 tablet 2   • Dulaglutide 3 MG/0.5ML Solution Pen-injector Inject 3 mg under the skin every 7 days. 0.5 mL 3   • clopidogrel (PLAVIX) 75 MG Tab Take 1 Tab by mouth every day. 90 Tab 3   • triamcinolone acetonide (KENALOG) 0.1 % Cream      • glucose blood strip OneTouch Ultra Blue Test Strip   U TO TEST TID     • fenofibrate (TRICOR) 145 MG Tab TAKE 1 TABLET BY MOUTH EVERY DAY 90 Tab 3   • insulin lispro (HUMALOG) 100 UNIT/ML Solution Pen-injector injection PEN INJECT 20 UNITS UNDER THE SKIN AS INSTRUCTED DAILY 96 mL 2   • atorvastatin (LIPITOR) 40 MG Tab Take 1 Tab by mouth every evening. 90 Tab 3   • TOUJEO SOLOSTAR 300 UNIT/ML Solution Pen-injector 30 Units every day.     • allopurinol (ZYLOPRIM) 100 MG Tab TAKE 1 TABLET BY MOUTH EVERY DAY 90 Tab 0   • Insulin Pen Needle 32 G x 4 mm (BD PEN NEEDLE SWATI U/F) USE FOR INSULIN SHOTS FIVE TIMES DAILY 500 Each 3   • fluoxetine (PROZAC) 40 MG capsule Take 1 Cap by mouth every day. 90 Cap 3   • losartan (COZAAR) 50 MG Tab Take 1 Tab by mouth 2 Times a Day. 180 Tab 3   • metoprolol SR (TOPROL XL) 100 MG TABLET SR 24 HR Take 1 Tab by mouth every day. 90 Tab 3   • amLODIPine (NORVASC) 5 MG Tab Take 1 Tab by  mouth every day. 90 Tab 3   • Multiple Vitamin (MULTI VITAMIN MENS PO) Take  by mouth.     • aspirin EC (ECOTRIN) 81 MG Tablet Delayed Response Take 81 mg by mouth every day.     • Probiotic Product (PROBIOTIC DAILY PO) Take 1 Cap by mouth 2 Times a Day.     • magnesium oxide (MAG-OX) 400 MG Tab Take 400 mg by mouth every day.     • finasteride (PROSCAR) 5 MG Tab Take 1 Tab by mouth every day. 30 Tab 0   • alfuzosin (UROXATRAL) 10 MG SR tablet Take 10 mg by mouth every day.     • methenamine hip (HIPPREX) 1 GM Tab Take 1 g by mouth 2 times a day.     • Insulin Glargine (TOUJEO MAX SOLOSTAR) 300 UNIT/ML Solution Pen-injector Inject 28 Units as instructed every bedtime.     • acetaminophen (TYLENOL) 500 MG Tab Take 1,000 mg by mouth every 6 hours as needed for Mild Pain or Moderate Pain.     • Cholecalciferol (VITAMIN D) 2000 units Cap Take 4,000 Units by mouth 2 Times a Day. Once a day       No current facility-administered medications for this visit.         Physical Exam: Limited by COVID-19 precautions.  Appearance: Well developed, well nourished, no acute distress  Eyes: PERRL, EOM intact, sclera white, conjunctiva moist  Ears: no lesions or deformities  Hearing: grossly intact  Nose: no lesions or deformities  Respiratory effort: no intercostal retractions or use of accessory muscles  Extremities: no cyanosis or edema  Abdomen: soft   Gait and Station: Patient is wheelchair-bound due to left-sided hemiparesis.   Digits and nails: no clubbing, cyanosis, petechiae or nodes.  Cranial nerves: grossly intact  Skin: no visible rashes, lesions or ulcers noted  Orientation: Oriented to time, person and place  Mood and affect: mood and affect appropriate, normal interaction with examiner  Judgement: Intact    Assessment:  1. ASHLEY (obstructive sleep apnea)  DME BiPAP   2. Paroxysmal atrial fibrillation (HCC)     3. Essential hypertension, benign     4. Psychophysiological insomnia     5. BMI 27.0-27.9,adult            Plan  Discussed the cardiovascular and neuropsychiatric risks of untreated ASHLEY; including but not limited to: HTN, DM, MI, ASCVD, CVA, CHF, traffic accidents.     1.  Patient's BiPAP machine was repossessed by the DME company last week due to noncompliance. Patient does not wish to proceed with therapy at this time nor repeat a sleep study.  If he changes his mind he will call and schedule follow-up appointment.     * Reviewed other alternative treatment options such as mandibular advancement device and UPPP therapy however patient was informed that these are more reserved for mild to moderate cases and in his case his apnea is very severe which would make these therapies likely ineffective.    * A prescription for BiPAP IPAP/EPAP 12/8 cmH2O with a FFM was provided today should the patient choose to purchase one out-of-pocket.  Patient was encouraged to be on therapy and risk factors were reviewed today.    2-3. Continue to f/u with cardiology, Dr. Tucker, for Afib Afib and BP management, take BP medication as directed and check BP at home.   4. Sleep hygiene discussed. Recommend keeping a set sleep/wake schedule. Logging enough hours of sleep. Limiting/Avoiding naps during the day to 30-40 min max. No caffeine after noon and no heavy meals in the evening.   5. Continue to stay active.    6. Follow up with the appropriate healthcare practitioners for all other medical problems and issues.  7. F/u in PRN.       ALYSSA Irwin.      This dictation was created using voice recognition software. The accuracy of the dictation is limited to the abilities of the software. I expect there may be some errors of grammar and possibly content.

## 2021-04-29 DIAGNOSIS — I10 ESSENTIAL HYPERTENSION, BENIGN: ICD-10-CM

## 2021-04-30 RX ORDER — AMLODIPINE BESYLATE 5 MG/1
TABLET ORAL
Qty: 90 TABLET | Refills: 2 | Status: SHIPPED | OUTPATIENT
Start: 2021-04-30 | End: 2021-05-19

## 2021-05-02 DIAGNOSIS — I10 ESSENTIAL HYPERTENSION: ICD-10-CM

## 2021-05-05 RX ORDER — METOPROLOL SUCCINATE 100 MG/1
TABLET, EXTENDED RELEASE ORAL
Qty: 90 TABLET | Refills: 0 | Status: SHIPPED | OUTPATIENT
Start: 2021-05-05 | End: 2021-05-19

## 2021-05-11 ENCOUNTER — OFFICE VISIT (OUTPATIENT)
Dept: PHYSICAL MEDICINE AND REHAB | Facility: REHABILITATION | Age: 76
End: 2021-05-11
Payer: MEDICARE

## 2021-05-11 DIAGNOSIS — G81.94 LEFT HEMIPARESIS (HCC): ICD-10-CM

## 2021-05-11 DIAGNOSIS — I69.954 HEMIPLEGIA AND HEMIPARESIS FOLLOWING UNSPECIFIED CEREBROVASCULAR DISEASE AFFECTING LEFT NON-DOMINANT SIDE (HCC): Primary | ICD-10-CM

## 2021-05-11 DIAGNOSIS — G81.14 SPASTIC HEMIPLEGIA AFFECTING LEFT NONDOMINANT SIDE, UNSPECIFIED ETIOLOGY (HCC): ICD-10-CM

## 2021-05-11 DIAGNOSIS — R25.2 SPASTICITY: ICD-10-CM

## 2021-05-11 DIAGNOSIS — M62.838 OTHER MUSCLE SPASM: ICD-10-CM

## 2021-05-11 PROCEDURE — 76942 ECHO GUIDE FOR BIOPSY: CPT | Performed by: PHYSICAL MEDICINE & REHABILITATION

## 2021-05-11 PROCEDURE — 64643 CHEMODENERV 1 EXTREM 1-4 EA: CPT | Performed by: PHYSICAL MEDICINE & REHABILITATION

## 2021-05-11 PROCEDURE — 64642 CHEMODENERV 1 EXTREMITY 1-4: CPT | Performed by: PHYSICAL MEDICINE & REHABILITATION

## 2021-05-12 NOTE — PROCEDURES
[facility], [place], operated by [facility]  Physical Medicine & Rehabilitation Clinic  [facility] [place] [address]  Ph: [phone]    PROCEDURE NOTE    Procedure: Botulinum Injection  Primary Diagnosis & Secondary Diagnoses:   Visit Diagnoses     ICD-10-CM   1. Hemiplegia and hemiparesis following unspecified cerebrovascular disease affecting left non-dominant side ([facility])  I69.954   2. Left hemiparesis ([facility])  G81.94   3. Spasticity  R25.2   4. Other muscle spasm  M62.838   5. Spastic hemiplegia affecting left nondominant side, unspecified etiology ([facility])  G81.14         INFORMED CONSENT   The risks and benefits of the procedure were explained to the patient, and all questions were answered. Benefits of the injection include spasticity reduction by decrease in muscle activation following toxin injection. Adverse effects from toxin injection include but are not limited to: excessive weakness, infection, breathing and/or swallowing difficulty.  Common adverse effects from the injection itself are pain and bruising. The patient demonstrated good understanding of risks and benefits and consents to botulinum toxin injection.     Received botulinum toxin product in last 3 mos: No  Have recently received abx (aminoglycosides): No  Take anti-spasticity medications: Yes  Take allergy/cold medicine:  No  Take a sleep medicine: No  Lactating/pregnant: No  Known NMJ disease: No  Human albumin allergy: No    Pre-procedure time out was performed.     PROCEDURE:  Usual sterile procedure was observed with sterile prep with chlorhexidine. Ultrasound was used for needle guidance for the injections in the following muscles. A negative aspiration of blood was obtained, and the following was injected into each muscle.    Botox: left upper extremity and left lower extremity  Location Botox Amount in Units   Left semimembranosus  75 units   Left semitendinosus  75 units   Left biceps femoris  75 units   Left bicep  75 units   Left FCR  50 units   Left FCU  50  units   Total Units  400 units          Injection sites were cleaned with alcohol and band aid was applied afterwards.  The patient tolerated the procedure well.  There were no complications.  The images were uploaded for permanent storage    MEDICATION USED:  100 units of botulinum toxin type A, mixed with 2mL of sterile, preservative-free normal saline in two 1cc syringes, for a total of 50 units in 1 mL. Repeated for other vials.      Total units injected: 400 units  Total units discarded: 0 units    See MAR for Botox details.     Counseling: Pt was instructed to seek immediate medical attention if fever, difficulty breathing, difficulty swallowing, or other concerning sxs arise. RTC in 2-4 weeks to investigate for effect at peak.    Additional Plan:   - Referral to OT  - Referral to PT    BOTOX (2 vials)  NDC 8683-1398-85  Lot [id number]  Exp 12/2023    BOTOX (2 vials)  NDC 3246-2126-11  Lot [id number]  Exp 12/2023    Dr. Lorie Huff DO, MS  Department of Physical Medicine & Rehabilitation  [facility]  [facility]

## 2021-05-19 ENCOUNTER — OFFICE VISIT (OUTPATIENT)
Dept: CARDIOLOGY | Facility: MEDICAL CENTER | Age: 76
End: 2021-05-19
Payer: MEDICARE

## 2021-05-19 VITALS
HEART RATE: 71 BPM | BODY MASS INDEX: 27.12 KG/M2 | HEIGHT: 72 IN | SYSTOLIC BLOOD PRESSURE: 110 MMHG | OXYGEN SATURATION: 94 % | RESPIRATION RATE: 16 BRPM | DIASTOLIC BLOOD PRESSURE: 64 MMHG

## 2021-05-19 DIAGNOSIS — Z71.89 GOALS OF CARE, COUNSELING/DISCUSSION: ICD-10-CM

## 2021-05-19 DIAGNOSIS — G47.33 OSA (OBSTRUCTIVE SLEEP APNEA): ICD-10-CM

## 2021-05-19 DIAGNOSIS — E78.5 HYPERLIPIDEMIA ASSOCIATED WITH TYPE 2 DIABETES MELLITUS (HCC): ICD-10-CM

## 2021-05-19 DIAGNOSIS — E78.5 DYSLIPIDEMIA: ICD-10-CM

## 2021-05-19 DIAGNOSIS — E11.21 DIABETIC NEPHROPATHY ASSOCIATED WITH TYPE 2 DIABETES MELLITUS (HCC): ICD-10-CM

## 2021-05-19 DIAGNOSIS — E11.69 HYPERLIPIDEMIA ASSOCIATED WITH TYPE 2 DIABETES MELLITUS (HCC): ICD-10-CM

## 2021-05-19 DIAGNOSIS — Z79.4 TYPE 2 DIABETES MELLITUS WITH OTHER SPECIFIED COMPLICATION, WITH LONG-TERM CURRENT USE OF INSULIN (HCC): ICD-10-CM

## 2021-05-19 DIAGNOSIS — I15.2 HYPERTENSION ASSOCIATED WITH DIABETES (HCC): ICD-10-CM

## 2021-05-19 DIAGNOSIS — I25.10 CORONARY ARTERY DISEASE INVOLVING NATIVE CORONARY ARTERY OF NATIVE HEART WITHOUT ANGINA PECTORIS: ICD-10-CM

## 2021-05-19 DIAGNOSIS — E11.69 TYPE 2 DIABETES MELLITUS WITH OTHER SPECIFIED COMPLICATION, WITH LONG-TERM CURRENT USE OF INSULIN (HCC): ICD-10-CM

## 2021-05-19 DIAGNOSIS — I73.9 PVD (PERIPHERAL VASCULAR DISEASE) (HCC): ICD-10-CM

## 2021-05-19 DIAGNOSIS — E11.59 HYPERTENSION ASSOCIATED WITH DIABETES (HCC): ICD-10-CM

## 2021-05-19 DIAGNOSIS — I10 ESSENTIAL HYPERTENSION: ICD-10-CM

## 2021-05-19 DIAGNOSIS — N18.31 STAGE 3A CHRONIC KIDNEY DISEASE: ICD-10-CM

## 2021-05-19 DIAGNOSIS — I10 ESSENTIAL HYPERTENSION, BENIGN: ICD-10-CM

## 2021-05-19 DIAGNOSIS — I48.0 PAROXYSMAL ATRIAL FIBRILLATION (HCC): ICD-10-CM

## 2021-05-19 PROCEDURE — 99214 OFFICE O/P EST MOD 30 MIN: CPT | Performed by: INTERNAL MEDICINE

## 2021-05-19 PROCEDURE — 99497 ADVNCD CARE PLAN 30 MIN: CPT | Performed by: INTERNAL MEDICINE

## 2021-05-19 RX ORDER — CLOPIDOGREL BISULFATE 75 MG/1
75 TABLET ORAL
Qty: 90 TABLET | Refills: 3 | Status: SHIPPED | OUTPATIENT
Start: 2021-05-19 | End: 2021-11-09

## 2021-05-19 RX ORDER — METOPROLOL SUCCINATE 100 MG/1
100 TABLET, EXTENDED RELEASE ORAL
Qty: 90 TABLET | Refills: 3 | Status: SHIPPED | OUTPATIENT
Start: 2021-05-19 | End: 2021-11-09

## 2021-05-19 RX ORDER — FENOFIBRATE 145 MG/1
145 TABLET, COATED ORAL
Qty: 90 TABLET | Refills: 3 | Status: SHIPPED | OUTPATIENT
Start: 2021-05-19 | End: 2021-11-09

## 2021-05-19 RX ORDER — MULTIVITAMIN,THERAPEUTIC
1 TABLET ORAL DAILY
COMMUNITY
End: 2021-07-06

## 2021-05-19 RX ORDER — ATORVASTATIN CALCIUM 40 MG/1
40 TABLET, FILM COATED ORAL EVERY EVENING
Qty: 90 TABLET | Refills: 3 | Status: SHIPPED | OUTPATIENT
Start: 2021-05-19 | End: 2021-11-09

## 2021-05-19 RX ORDER — AMLODIPINE BESYLATE 5 MG/1
5 TABLET ORAL
Qty: 90 TABLET | Refills: 3 | Status: SHIPPED | OUTPATIENT
Start: 2021-05-19 | End: 2021-11-09

## 2021-05-19 RX ORDER — LOSARTAN POTASSIUM 50 MG/1
50 TABLET ORAL 2 TIMES DAILY
Qty: 180 TABLET | Refills: 3 | Status: SHIPPED | OUTPATIENT
Start: 2021-05-19 | End: 2021-11-09

## 2021-05-19 ASSESSMENT — ENCOUNTER SYMPTOMS
VOMITING: 1
HEARTBURN: 1
BRUISES/BLEEDS EASILY: 1

## 2021-05-19 NOTE — PROGRESS NOTES
Cardiology Follow-up Consultation Note    Date of note: 5/19/2021  Primary Care Provider: Mitchell Pantoja M.D.  Referring Provider: Leonardo.     Patient Name: Av Bob   YOB: 1945  MRN:              [id number]    Chief Complaint: CAD    History of Present Illness: Av Bob is a 76 y.o. male whose current medical problems include CKD stage III, hypertension, CAD  (GIOVANNA to RCA in '97, GIOVANNA X2 to LAD and GIOVANNA X to OM in '09), atrial fibrillation, diabetes, dyslipidemia, gout, CVA 12/2016,  non-hodgkins lymphoma c/b brain lesions with resultant left hemiparesis s/p chemotherapy and depression who is here for follow-up.     At our visit, 4/3/2018:  In terms of his CVA, this was in 2016, and while he was on aspirin and plavix at [facility].  This was in the setting of active lymphoma.    He was also admitted to [facility] again in 5/2017 for sepsis and was noted to have atrial fibrillation at this time. He has no noted recurrence and no episodes before that.  He has never been on anticoagulation for Cva prevention.     In terms of his NHL, he sees Dr. Noel, and his last PET scan showed he was cancer free.     Right leg wounds, currently healing. Evaluated by Dr. Terry for bilateral tibial artery stenosis, and decided against surgery at this time.     At our visit, 10/9/2018:  Started xarelto and had episodes of melena.  Switched back to plavix.  FOBT was negative.     At our visit, 4/16/2019:  Admitted 11/21/2018 for weakness, negative cerebrovascular work-up.     At our visit, 10/8/2019:  In terms of cancer, no e/o recurrence.     Still works with  an hour three times a week, no symptoms.    Was admitted for UTI this summer.     Did have peripheral angiogram, noted stenosis but no intervention performed. At [facility].     At our visit, 5/6/2020:  Cholesterol was poorly controlled, I increased his lipitor.     Hospitalized 1/21/2020 for  hematuria and UTI requiring bladder irrigation.     Fatigued possibly improved on lower dose of metoprolol.     At our visit, 11/17/2020:  Still with leg cramping on occasion.     Interim Events:  In terms of Cad, no angina.     In terms of PSVT, no palpitations.     Hypertension well controlled usually in the 120s.     Diabetes worsening.       Review of Systems   Constitutional: Positive for malaise/fatigue.   Hematologic/Lymphatic: Bruises/bleeds easily.   Gastrointestinal: Positive for heartburn and vomiting.     All other systems reviewed and discussed using a comprehensive questionnaire and are negative.       Past Medical History:   Diagnosis Date   • ([facility]) Chronic ulcer of right lower extremity with fat layer exposed 12/27/2018   • Acute on chronic renal failure ([facility]) 1/21/2020   • Acute respiratory failure with hypoxia ([facility]) 5/20/2017   • CAD (coronary artery disease)     GIOVANNA to RCA in '97, GIOVANNA X2 to LAD and GIOVANNA X2 to OM in '09   • Cancer ([facility])     2017; chemo lympoma   • Cataract    • Cerebrovascular accident (CVA) ([facility]) 12/30/2016    Left arm weakness  etiology of stroke not established, lymphoma discovered on MRI evaluation of stroke, L hemiparesis much worse after acute infectious illness in mid 2017, but no specific diagnosed recurrent neurological etiology, all at [facility]   • Chickenpox    • CKD (chronic kidney disease) stage 3, GFR 30-59 ml/min ([facility])    • Controlled gout 2014   • Coronary atherosclerosis of native coronary artery     S/P PTCA (percutaneous transluminal coronary angioplasty), RCA, 5/1997, patent on cath 7/10/2009 at the time of interventions on his left anterior descending and circumflex coronary arteries   • Daytime sleepiness    • Depression    • Diabetes ([facility])    • Difficulty swallowing    • Enterococcal septicemia ([facility]) 8/12/2017   • Roberto hematuria 1/29/2020   • Frequent urination    • GERD (gastroesophageal reflux disease)    • Turkish measles     • Hypertension    • Hypokalemia 2012    controlled with combination of ACE inhibitor or ARB plus spironolactone   • Hypomagnesemia 08/12/2017    etiology uncertain   • Influenza A 1/26/2020   • Insomnia    • Kidney stone    • Leg edema, right 1/22/2020   • Lymphoma (HCC) 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 Barry   • Normocytic hypochromic anemia 5/20/2017   • NSTEMI (non-ST elevated myocardial infarction) ([facility]) 07/18/2017    complicating UTI with sepsis   • Pain    • Peripheral vascular disease ([facility])    • Polyneuropathy in diabetes(357.2) 9/11/2013   • Renal mass 1/21/2020   • Septic shock ([facility]) 5/20/2017   • Skin ulcer of calf ([facility]) 2015    Right, Dr. Terry and wound care   • Stroke ([facility]) 2016    left sided weakness   • Urinary bladder disorder    • Urinary incontinence    • Weakness    • Wound of left leg 2012    Requiring surgery and debridment, Dr. Moore         Past Surgical History:   Procedure Laterality Date   • LUMBAR TRANSFORAMINAL EPIDURAL STEROID INJECTION Right 3/29/2021    Procedure: RIGHT L3-4 and L4-5 transforaminal epidural steroid injection with fluoroscopic guidance;  Surgeon: Harpal Bennett M.D.;  [facility];  Service: Pain Management   • LUMBAR TRANSFORAMINAL EPIDURAL STEROID INJECTION Right 11/2/2020    Procedure: INJECTION, STEROID, SPINE, LUMBAR, EPIDURAL, TRANSFORAMINAL APPROACH;  Surgeon: Harpal Bennett M.D.;  Location: [facility];  Service: Pain Management   • CYSTOSCOPY STENT PLACEMENT Left 2/12/2018    Procedure: CYSTOSCOPY  ;  Surgeon: Rey Barry M.D.;  Location: [facility];  Service: Urology   • URETEROSCOPY Left 2/12/2018    Procedure: URETEROSCOPY- FLEXIBLE  ;  Surgeon: Rey Barry M.D.;  Location: [facility];  Service: Urology   • LASERTRIPSY Left 2/12/2018    Procedure: LASERTRIPSY - LITHO  ;  Surgeon: Rey RAMOS  ADRIANA Barry;  Location: SURGERY [facility];  Service: Urology   • WOUND CLOSURE GENERAL  4/3/2012    Performed by GERHARD GILBERT at SURGERY [facility] DAY [facility]   • LETITIA CARDIAC CATH  2009    Stents to LAD, Om   • DAGOBERTO BY LAPAROSCOPY  1998   • ANGIOPLASTY  1997    RCA followed by other stents as noted above.    • ZJEFFREY CARDIAC CATH  1997    stent RCA   • CATARACT EXTRACTION WITH IOL      bilateral   • LITHOTRIPSY     • TONSILLECTOMY     • TONSILLECTOMY AND ADENOIDECTOMY           Current Outpatient Medications   Medication Sig Dispense Refill   • Multiple Vitamins-Minerals (ONCOVITE) Tab Take 1 tablet by mouth every day.     • metoprolol SR (TOPROL XL) 100 MG TABLET SR 24 HR TAKE 1 TABLET BY MOUTH EVERY DAY 90 tablet 0   • allopurinol (ZYLOPRIM) 100 MG Tab TAKE 1 TABLET BY MOUTH EVERY  tablet 3   • amLODIPine (NORVASC) 5 MG Tab TAKE 1 TABLET BY MOUTH EVERY DAY 90 tablet 2   • gabapentin (NEURONTIN) 100 MG Cap Take 1-3 Capsules by mouth at bedtime as needed (pain). 90 capsule 3   • potassium chloride (KLOR-CON) 8 MEQ tablet TAKE 1 TABLET BY MOUTH EVERY DAY 90 tablet 2   • Dulaglutide 3 MG/0.5ML Solution Pen-injector Inject 3 mg under the skin every 7 days. 0.5 mL 3   • clopidogrel (PLAVIX) 75 MG Tab Take 1 Tab by mouth every day. 90 Tab 3   • triamcinolone acetonide (KENALOG) 0.1 % Cream      • glucose blood strip OneTouch Ultra Blue Test Strip   U TO TEST TID     • fenofibrate (TRICOR) 145 MG Tab TAKE 1 TABLET BY MOUTH EVERY DAY 90 Tab 3   • insulin lispro (HUMALOG) 100 UNIT/ML Solution Pen-injector injection PEN INJECT 20 UNITS UNDER THE SKIN AS INSTRUCTED DAILY 96 mL 2   • atorvastatin (LIPITOR) 40 MG Tab Take 1 Tab by mouth every evening. 90 Tab 3   • Insulin Pen Needle 32 G x 4 mm (BD PEN NEEDLE SWATI U/F) USE FOR INSULIN SHOTS FIVE TIMES DAILY 500 Each 3   • fluoxetine (PROZAC) 40 MG capsule Take 1 Cap by mouth every day. 90 Cap 3   • losartan (COZAAR) 50 MG Tab Take 1 Tab by mouth 2 Times a Day.  180 Tab 3   • Multiple Vitamin (MULTI VITAMIN MENS PO) Take  by mouth.     • aspirin EC (ECOTRIN) 81 MG Tablet Delayed Response Take 81 mg by mouth every day.     • Probiotic Product (PROBIOTIC DAILY PO) Take 1 Cap by mouth 2 Times a Day.     • magnesium oxide (MAG-OX) 400 MG Tab Take 400 mg by mouth every day.     • finasteride (PROSCAR) 5 MG Tab Take 1 Tab by mouth every day. 30 Tab 0   • alfuzosin (UROXATRAL) 10 MG SR tablet Take 10 mg by mouth every day.     • methenamine hip (HIPPREX) 1 GM Tab Take 1 g by mouth 2 times a day.     • Insulin Glargine (TOUJEO MAX SOLOSTAR) 300 UNIT/ML Solution Pen-injector Inject 28 Units as instructed every bedtime.     • acetaminophen (TYLENOL) 500 MG Tab Take 1,000 mg by mouth every 6 hours as needed for Mild Pain or Moderate Pain.     • Cholecalciferol (VITAMIN D) 2000 units Cap Take 4,000 Units by mouth 2 Times a Day. Once a day     • TOUJEO SOLOSTAR 300 UNIT/ML Solution Pen-injector 30 Units every day. (Patient not taking: Reported on 5/19/2021)       No current facility-administered medications for this visit.         Allergies   Allergen Reactions   • Diphenhydramine Hcl Anxiety     Pt is able to tolerate  Mg benadryl with less anxiety   • Lorazepam Unspecified     Disorientation   • Ciprofloxacin      Rash,stomach ache   • Spironolactone      Acute kidney injury         Family History   Problem Relation Age of Onset   • Heart Disease Father         CAD   • Diabetes Father    • Cancer Mother    • Psychiatric Illness Mother         Depression   • Depression Mother    • Kidney stones Brother    • Heart Disease Brother    • Psychiatric Illness Brother         Depression   • Depression Brother    • Suicide Attempts Other    • Psychiatric Illness Other         autism         Social History     Socioeconomic History   • Marital status:      Spouse name: Not on file   • Number of children: Not on file   • Years of education: Not on file   • Highest education level: Not on  file   Occupational History   • Not on file   Tobacco Use   • Smoking status: Never Smoker   • Smokeless tobacco: Never Used   • Tobacco comment: continued abstinence   Vaping Use   • Vaping Use: Never used   Substance and Sexual Activity   • Alcohol use: Never   • Drug use: Never   • Sexual activity: Not Currently     Partners: Female     Comment: , one daughter, 2 grands   Other Topics Concern   •  Service Yes   • Blood Transfusions No   • Caffeine Concern No   • Occupational Exposure No   • Hobby Hazards No   • Sleep Concern Yes   • Stress Concern No   • Weight Concern No   • Special Diet No   • Back Care No   • Exercise Yes   • Bike Helmet No   • Seat Belt Yes   • Self-Exams Yes   Social History Narrative    Av is from [place], [place] and raised in [place]. He has been in [place] since 2004. 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  He has been  to Usha since 2003, they met through a mutual friend. He has a daughter (Kalina) and a step son (Jimi).     Social Determinants of Health     Financial Resource Strain:    • Difficulty of Paying Living Expenses:    Food Insecurity:    • Worried About Running Out of Food in the Last Year:    • Ran Out of Food in the Last Year:    Transportation Needs:    • Lack of Transportation (Medical):    • Lack of Transportation (Non-Medical):    Physical Activity:    • Days of Exercise per Week:    • Minutes of Exercise per Session:    Stress:    • Feeling of Stress :    Social Connections:    • Frequency of Communication with Friends and Family:    • Frequency of Social Gatherings with Friends and Family:    • Attends [employer] Services:    • Active Member of Clubs or Organizations:    • Attends Club or Organization Meetings:     • Marital Status:    Intimate Partner Violence:    • Fear of Current or Ex-Partner:    • Emotionally Abused:    • Physically Abused:    • Sexually Abused:           Physical Exam:  Ambulatory Vitals  /64 (BP Location: Left arm, Patient Position: Sitting, BP Cuff Size: Adult)   Pulse 71   Resp 16   Ht 1.829 m (6')   SpO2 94%    Oxygen Therapy:  Pulse Oximetry: 94 %  BP Readings from Last 4 Encounters:   05/19/21 110/64   04/26/21 122/70   04/19/21 130/76   04/08/21 140/76       Weight/BMI: Body mass index is 27.12 kg/m².  Wt Readings from Last 4 Encounters:   04/26/21 90.7 kg (200 lb)   04/19/21 90.7 kg (200 lb)   04/08/21 88.5 kg (195 lb)   03/29/21 88.5 kg (195 lb)         General: No apparent distress  Eyes: nl conjunctiva  ENT: OP covered by mask,   Neck: JVP <8 cm H2O, no carotid bruits  Lungs: normal respiratory effort, CTAB  Heart: RRR, no murmurs, no rubs or gallops, 1+ edema bilateral lower extremities. No LV/RV heave on cardiac palpatation. 2+ bilateral radial pulses.   Abdomen: soft, non tender, non distended, no masses, normal bowel sounds.  No HSM.  Extremities/MSK: no clubbing, no cyanosis  Neurological: ZHANG  Psychiatric: Appropriate affect, A/O x 3, intact judgement and insight  Skin: Warm extremities    Exam repeated in full and unchanged except for as noted above.        Lab Data Review:  Lab Results   Component Value Date/Time    CHOLSTRLTOT 127 02/25/2021 11:25 AM    LDL 76 02/25/2021 11:25 AM    HDL 31 (A) 02/25/2021 11:25 AM    TRIGLYCERIDE 102 02/25/2021 11:25 AM       Lab Results   Component Value Date/Time    SODIUM 143 02/25/2021 11:25 AM    POTASSIUM 3.9 02/25/2021 11:25 AM    CHLORIDE 107 02/25/2021 11:25 AM    CO2 26 02/25/2021 11:25 AM    GLUCOSE 111 (H) 02/25/2021 11:25 AM    BUN 25 (H) 02/25/2021 11:25 AM    CREATININE 1.33 02/25/2021 11:25 AM    CREATININE 1.4 05/28/2008 05:42 PM    BUNCREATRAT 10 03/27/2017 02:11 PM     Lab Results   Component Value Date/Time     ALKPHOSPHAT 63 02/25/2021 11:25 AM    ASTSGOT 20 02/25/2021 11:25 AM    ALTSGPT 18 02/25/2021 11:25 AM    TBILIRUBIN 0.5 02/25/2021 11:25 AM      Lab Results   Component Value Date/Time    WBC 8.1 02/25/2021 11:25 AM     No components found for: HBGA1C  No components found for: TROPONIN  No results found for: BNP      Cardiac Imaging and Procedures Review:    EKG dated 1/26/2020: personally reviewed and showed NSR, artifact, IVCD, late precordial R/S transitoin and prolonged QTc interval.     Echo dated 11/21/2018:  CONCLUSIONS  Prior echocardiogram 10/5/2017 - no noted changes.  The effusion is   new.  Left ventricular ejection fraction is visually estimated to be 60%.  Normal inferior vena cava size and inspiratory collapse.  Trivial to small pericardial effusion noted without evidence of   hemodynamic compromise.  No significant valve disease or flow abnormalities.     Holter, 5/30/2018:    CONCLUSIONS:  * paroxysmal supraventricular tachycardia.  15 episodes over 13 days.  Longest episode 17 seconds  * No atrial fibrillation  * No significant ectopy  * No arrhythmias or ectopy during symptoms      Nuclear Perfusion Imaging (1/2018):   Myocardial Perfusion   Report   NUCLEAR IMAGING INTERPRETATION   The LV is mildly dilated with global hypokinesis.  Calculated LV EF is 49%.     There is a small region of decreased perfusion in the inferior-lateral wall    with a mild amount of inducible ischemia.   ECG INTERPRETATION   Negative stress ECG for ischemia.      Stress test 6/2017 ([facility])    Findings:   Small in size moderate in intensity fixed apical defect. Computer analysis also suggests mild in intensity small in size diminished counts along inferior wall of left ventricle which appears to improve at time of rest. Summed stress score is   7. Summed rest score is 1. Gross hypomotility of the left ventricle is noted with disordered contraction evident. Global hypokinesis is noted.      [facility] (2009): at [facility], last stent.     Radiology test Review:  CXR:1/26/2020:  IMPRESSION:     Stable chest without acute/new abnormality.    Vascular US 2/2018:  Vascular Laboratory   Conclusions   There is no evidence of arterial disease demonstrated (PADMINI is .9-1.0).    Absent flow within the Left VIVIENNE.     Vascular Laboratory   CONCLUSIONS   Right Lower Extremity-   Normal flow from the common femoral artery extending distally to the    popliteal artery. Flow within the posterior tibial artery is brisk and    multiphasic.   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     Left Lower Extremity-   Normal flow seen from the common femoral artery extending distally to the    popliteal artery.   Area of elevated velocities seen within the mid segment of the posterior    tibial artery. Consistent with >50% stenosis.   Occlusion of the anterior tibial artery at the mid-distal segment, minimal    flow reversal seen at the level of the ankle.      MRI 12/31/2016  7 mm acute/early subacute infarct is present in the right anterolateral medulla.  There is no significant mass effect or hemorrhage.  No other recent infarction.    12/31/2016:  Three sites of abnormal parenchymal enhancement are present.  *  At the previously seen right anterior pontine lesion, there is a 5 x 3 x 7 mm elongated enhancing peripheral pontine lesion.  *  There is a second 2 mm enhancing focus also in the right anterior david.  *  There is a third 3 mm lesion at the left globus pallidus internus.    These findings may represent metastatic disease.  The elongated morphology of the anterior peripheral pontine lesion is somewhat atypical for metastatic lesion, and could potentially represent enhancement of a subacute infarct.  Two other lesions could also, in theory, represent subacute enhancing reperfusion of lacunar infarctions. Anecdotally, this would be an unusual finding in lacunar  infarctions. Note that post infarct enhancement typically occurs approximately 1 week after infarction and resolves within 12 weeks or less.       Head CTA 2016:  1. There are plaque formations identified in both carotid bifurcations   (right greater than left). This causes approximately 50% stenosis on the   right and 10-20% stenosis on the left in the proximal internal carotid   arteries. The remainder of the carotid   vasculature is unremarkable.  2. The vertebral arteries are within normal limits.  3. No dissection.  4. A 5.5 mm left thyroid lobe cyst/nodule is present.  5. A 10 x 13 mm partially calcified pulmonary nodule is present in the   left upper lobe. There may be some internal fat density. Findings could   represent a benign pulmonary hamartoma. Recommend clinical correlation.  6. Moderate to advanced degenerative changes are present throughout the   cervical spine.      Medical Decision Makin. Atherosclerosis of native coronary artery of native heart without angina pectoris  S/p 5 stents, last in . Mild ischemia on two recent nuclear perfusion scans    Currently without recurrent chest pain (had some atypical chest pain ) so will treat medically.  -continue aspirin/lipitor  -fasting blood tests ordered for next labs, lipid panel.     2. 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 Rasheed, now well controlled, last hgbA1c <7    3. Essential hypertension, benign  Well controlled.   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   -Continue losartan 50mg PO bid  -continue norvasc 5mg PO Daily.  -continue metoprolol XL 100mg PO Daily    4. Paroxysmal atrial fibrillation (CMS-[facility])  Self limited and one time in setting of sepsis. Previously discussed at length risks of recurrent CVA given his previous likely embolic event, and if he would like an ILR to confirm  diagnosis or to start empiric anticoagulation.  He prefers the later and we started xarelto 4/2018.  Unfortunately he had melena almost immediately, and was switched back to aspirin/plavix.  He does not want to retrial anticoagulation, or a GI referral as he sees enough physicians already.   -continue aspirin/plavix. No anticoag for now given patient preference despite risk of recurrent CVA.    -continue metoprolol XL 100mg PO Daily, dose decreased from prior due to fatigue.     5. CKD (chronic kidney disease) stage 3, GFR 30-59 ml/min  Stable.  -avoid spironolactone  -continue losartan  -followed by Dr. Jarvis.     6. Diffuse large cell non-Hodgkin's lymphoma (CMS-HCC)  Followed by Dr. Noel, in remission per report with recent negative PET scan.    7. Left hemiparesis (CMS-HCC)  Acute 12/2016 at [facility].  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  Symptoms originally improved significantly with chemotherapy and he was walking well, and then hemiparesis worsened significantly after sepsis from UTI and norovirus infection mid 2017.  Currently continuing physical therapy, but remains in a wheelchair.     8. Cerebrovascular accident (CVA) due to embolism of cerebral artery (CMS-HCC)  In addition to cerebral enhancing lesions thought to be lymphoma on MRI 12/2016, he was found to have a subacute infarct in the right anterolateral medulla.    -aspirin/plavix for CVA prevention    9. Dyslipidemia  Lipitor, fenofibrate. Recheck lipids with next labs.     10. Peripheral vascular disease (CMS-HCC)  Followed by Dr. Terry previously but now followed by Dr. Mohsen [facility]. No recent intervention. Contributing to non-healing wounds.   -continue aspirin/plavix     11. Goals of care - this is completed, they have scanned in a card with the number to cloud access to their documents. We did discuss his prognosis during a code today and their previous  code status and AD discussions in the past. He stated he would like to be full code despite his medical comorbidities. We filled out a POLST stating such and will continue our conversations about this in the future. We spent 17 minutes during this discussion.         Return in about 6 months (around 11/19/2021).       Osvaldo Tucker MD  [facility], [address]  [address] [place] [id number]  Phone: [phone]  Fax: [phone]

## 2021-06-08 ENCOUNTER — APPOINTMENT (OUTPATIENT)
Dept: PHYSICAL MEDICINE AND REHAB | Facility: REHABILITATION | Age: 76
End: 2021-06-08
Payer: MEDICARE

## 2021-06-18 ENCOUNTER — TELEPHONE (OUTPATIENT)
Dept: CARDIOLOGY | Facility: MEDICAL CENTER | Age: 76
End: 2021-06-18

## 2021-06-18 NOTE — TELEPHONE ENCOUNTER
Received surgical clearance for patient from [facility] for EGD with Anesthesia scheduled on 07/21 with Dr. Neville Huerta.  Requesting to hold plavix for 7 days.  P: [phone]  F:[phone]    Routed to BLANCO.

## 2021-06-21 ENCOUNTER — OFFICE VISIT (OUTPATIENT)
Dept: PHYSICAL MEDICINE AND REHAB | Facility: REHABILITATION | Age: 76
End: 2021-06-21
Payer: MEDICARE

## 2021-06-21 VITALS
HEART RATE: 90 BPM | OXYGEN SATURATION: 97 % | RESPIRATION RATE: 18 BRPM | SYSTOLIC BLOOD PRESSURE: 122 MMHG | DIASTOLIC BLOOD PRESSURE: 60 MMHG | TEMPERATURE: 97.4 F

## 2021-06-21 DIAGNOSIS — M62.838 OTHER MUSCLE SPASM: ICD-10-CM

## 2021-06-21 DIAGNOSIS — I69.954 HEMIPLEGIA AND HEMIPARESIS FOLLOWING UNSPECIFIED CEREBROVASCULAR DISEASE AFFECTING LEFT NON-DOMINANT SIDE (HCC): Primary | ICD-10-CM

## 2021-06-21 DIAGNOSIS — G81.14 SPASTIC HEMIPLEGIA AFFECTING LEFT NONDOMINANT SIDE, UNSPECIFIED ETIOLOGY (HCC): ICD-10-CM

## 2021-06-21 PROCEDURE — 99212 OFFICE O/P EST SF 10 MIN: CPT | Performed by: PHYSICAL MEDICINE & REHABILITATION

## 2021-06-21 NOTE — PROGRESS NOTES
[facility]  [facility]  [address] [place] [address]  Ph: [phone]    FOLLOW UP OUTPATIENT EVALUATION    Patient Name: Av Bob   Patient : 1945  Patient Age: 76 y.o.     Examining Physician: Dr. Lorie Huff,     PM&R History to Date - Background Information:  Dates of Admission/Discharge to ARU: Larry Williamson - briefly 2017    SUBJECTIVE:   Patient Identification: Av Bob is a 76 y.o. 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  -States that he does notice that a little looser.  -Wife states that Occupational Therapy is does note that his little looser is well.  -Has not had physical therapy yet, will be going this week for the first time.  -Cognitively doing better off of spasticity agents which have negative cognitive side effects.  -Has had some continued back pain for which he takes gabapentin at night.  Gabapentin makes him feel little bit sluggish the next day.  -States that he bruises easily, is on antiplatelets, however this seems somewhat new.  Follows up with primary care shortly.    Review of Systems:  All other pertinent positive review of systems are noted above in HPI.   All other systems reviewed and are negative.    Past Medical History:  Past Medical History:   Diagnosis Date   • Roberto hematuria 2020   • Influenza A 2020   • Leg edema, right 2020   • Acute on chronic renal failure (HCC) 2020   • Renal mass 2020   • (HCC) Chronic ulcer of right lower  extremity with fat layer exposed 12/27/2018   • Enterococcal septicemia ([facility]) 8/12/2017   • Hypomagnesemia 08/12/2017    etiology uncertain   • NSTEMI (non-ST elevated myocardial infarction) ([facility]) 07/18/2017    complicating UTI with sepsis   • Acute respiratory failure with hypoxia ([facility]) 5/20/2017   • Septic shock ([facility]) 5/20/2017   • Normocytic hypochromic anemia 5/20/2017   • Lymphoma ([facility]) 2/19/2017    Large cell   • Cerebrovascular accident (CVA) ([facility]) 12/30/2016    Left arm weakness  etiology of stroke not established, lymphoma discovered on MRI evaluation of stroke, L hemiparesis much worse after acute infectious illness in mid 2017, but no specific diagnosed recurrent neurological etiology, all at [facility]   • Stroke ([facility]) 2016    left sided weakness   • Skin ulcer of calf ([facility]) 2015    Right, Dr. Terry and wound care   • Controlled gout 2014   • Polyneuropathy in diabetes(357.2) 9/11/2013   • Hypokalemia 2012    controlled with combination of ACE inhibitor or ARB plus spironolactone   • Wound of left leg 2012    Requiring surgery and debridment, Dr. Moore   • Nephrolithiasis 2006    right kidney subsequent lithotripsy by Dr. Barry   • CAD (coronary artery disease)     GIOVANNA to RCA in '97, GIOVANNA X2 to LAD and GIOVANNA X2 to OM in '09   • Cancer ([facility])     2017; chemo lympoma   • Cataract    • Chickenpox    • CKD (chronic kidney disease) stage 3, GFR 30-59 ml/min ([facility])    • Coronary atherosclerosis of native coronary artery     S/P PTCA (percutaneous transluminal coronary angioplasty), RCA, 5/1997, patent on cath 7/10/2009 at the time of interventions on his left anterior descending and circumflex coronary arteries   • Daytime sleepiness    • Depression    • Diabetes ([facility])    • Difficulty swallowing    • Frequent urination    • GERD (gastroesophageal reflux disease)    • British measles    • Hypertension    • Insomnia    • Kidney stone    • Mixed hyperlipidemia    • Pain    •  Peripheral vascular disease (HCC)    • Urinary bladder disorder    • Urinary incontinence    • Weakness       Past Surgical History:   Procedure Laterality Date   • LUMBAR TRANSFORAMINAL EPIDURAL STEROID INJECTION Right 3/29/2021    Procedure: RIGHT L3-4 and L4-5 transforaminal epidural steroid injection with fluoroscopic guidance;  Surgeon: Harpal Bennett M.D.;  Location: [facility];  Service: Pain Management   • LUMBAR TRANSFORAMINAL EPIDURAL STEROID INJECTION Right 11/2/2020    Procedure: INJECTION, STEROID, SPINE, LUMBAR, EPIDURAL, TRANSFORAMINAL APPROACH;  Surgeon: Harpal Bennett M.D.;  Location: [facility];  Service: Pain Management   • CYSTOSCOPY STENT PLACEMENT Left 2/12/2018    Procedure: CYSTOSCOPY  ;  Surgeon: Rey Barry M.D.;  Location: SURGERY [facility];  Service: Urology   • URETEROSCOPY Left 2/12/2018    Procedure: URETEROSCOPY- FLEXIBLE  ;  Surgeon: Rey Barry M.D.;  Location: SURGERY [facility];  Service: Urology   • LASERTRIPSY Left 2/12/2018    Procedure: LASERTRIPSY - LITHO  ;  Surgeon: Rey Barry M.D.;  Location: SURGERY [facility];  Service: Urology   • WOUND CLOSURE GENERAL  4/3/2012    Performed by GERHARD GILBERT at SURGERY SAME DAY [facility] ORS   • ZZZ CARDIAC CATH  2009    Stents to LAD, Om   • DAGOBERTO BY LAPAROSCOPY  1998   • ANGIOPLASTY  1997    RCA followed by other stents as noted above.    • ZZZ CARDIAC CATH  1997    stent RCA   • CATARACT EXTRACTION WITH IOL      bilateral   • LITHOTRIPSY     • TONSILLECTOMY     • TONSILLECTOMY AND ADENOIDECTOMY          Current Outpatient Medications:   •  Multiple Vitamins-Minerals (ONCOVITE) Tab, Take 1 tablet by mouth every day., Disp: , Rfl:   •  metoprolol SR (TOPROL XL) 100 MG TABLET SR 24 HR, Take 1 tablet by mouth every day., Disp: 90 tablet, Rfl: 3  •  losartan (COZAAR) 50 MG Tab, Take 1 tablet by mouth 2 times a day., Disp: 180 tablet, Rfl: 3  •  fenofibrate (TRICOR) 145 MG  Tab, Take 1 tablet by mouth every day., Disp: 90 tablet, Rfl: 3  •  clopidogrel (PLAVIX) 75 MG Tab, Take 1 tablet by mouth every day., Disp: 90 tablet, Rfl: 3  •  atorvastatin (LIPITOR) 40 MG Tab, Take 1 tablet by mouth every evening., Disp: 90 tablet, Rfl: 3  •  amLODIPine (NORVASC) 5 MG Tab, Take 1 tablet by mouth every day., Disp: 90 tablet, Rfl: 3  •  allopurinol (ZYLOPRIM) 100 MG Tab, TAKE 1 TABLET BY MOUTH EVERY DAY, Disp: 100 tablet, Rfl: 3  •  gabapentin (NEURONTIN) 100 MG Cap, Take 1-3 Capsules by mouth at bedtime as needed (pain)., Disp: 90 capsule, Rfl: 3  •  potassium chloride (KLOR-CON) 8 MEQ tablet, TAKE 1 TABLET BY MOUTH EVERY DAY, Disp: 90 tablet, Rfl: 2  •  Dulaglutide 3 MG/0.5ML Solution Pen-injector, Inject 3 mg under the skin every 7 days., Disp: 0.5 mL, Rfl: 3  •  triamcinolone acetonide (KENALOG) 0.1 % Cream, , Disp: , Rfl:   •  glucose blood strip, OneTouch Ultra Blue Test Strip  U TO TEST TID, Disp: , Rfl:   •  insulin lispro (HUMALOG) 100 UNIT/ML Solution Pen-injector injection PEN, INJECT 20 UNITS UNDER THE SKIN AS INSTRUCTED DAILY, Disp: 96 mL, Rfl: 2  •  Insulin Pen Needle 32 G x 4 mm (BD PEN NEEDLE SWATI U/F), USE FOR INSULIN SHOTS FIVE TIMES DAILY, Disp: 500 Each, Rfl: 3  •  fluoxetine (PROZAC) 40 MG capsule, Take 1 Cap by mouth every day., Disp: 90 Cap, Rfl: 3  •  Multiple Vitamin (MULTI VITAMIN MENS PO), Take  by mouth., Disp: , Rfl:   •  aspirin EC (ECOTRIN) 81 MG Tablet Delayed Response, Take 81 mg by mouth every day., Disp: , Rfl:   •  Probiotic Product (PROBIOTIC DAILY PO), Take 1 Cap by mouth 2 Times a Day., Disp: , Rfl:   •  magnesium oxide (MAG-OX) 400 MG Tab, Take 400 mg by mouth every day., Disp: , Rfl:   •  finasteride (PROSCAR) 5 MG Tab, Take 1 Tab by mouth every day., Disp: 30 Tab, Rfl: 0  •  alfuzosin (UROXATRAL) 10 MG SR tablet, Take 10 mg by mouth every day., Disp: , Rfl:   •  methenamine hip (HIPPREX) 1 GM Tab, Take 1 g by mouth 2 times a day., Disp: , Rfl:   •  Insulin  Glargine (TOUJEO MAX SOLOSTAR) 300 UNIT/ML Solution Pen-injector, Inject 28 Units as instructed every bedtime., Disp: , Rfl:   •  acetaminophen (TYLENOL) 500 MG Tab, Take 1,000 mg by mouth every 6 hours as needed for Mild Pain or Moderate Pain., Disp: , Rfl:   •  Cholecalciferol (VITAMIN D) 2000 units Cap, Take 4,000 Units by mouth 2 Times a Day. Once a day, Disp: , Rfl:   Allergies   Allergen Reactions   • Diphenhydramine Hcl Anxiety     Pt is able to tolerate  Mg benadryl with less anxiety   • Lorazepam Unspecified     Disorientation   • Ciprofloxacin      Rash,stomach ache   • Spironolactone      Acute kidney injury        Past Social History:  Social History     Socioeconomic History   • Marital status:      Spouse name: Not on file   • Number of children: Not on file   • Years of education: Not on file   • Highest education level: Not on file   Occupational History   • Not on file   Tobacco Use   • Smoking status: Never Smoker   • Smokeless tobacco: Never Used   • Tobacco comment: continued abstinence   Vaping Use   • Vaping Use: Never used   Substance and Sexual Activity   • Alcohol use: Never   • Drug use: Never   • Sexual activity: Not Currently     Partners: Female     Comment: , one daughter, 2 grands   Other Topics Concern   •  Service Yes   • Blood Transfusions No   • Caffeine Concern No   • Occupational Exposure No   • Hobby Hazards No   • Sleep Concern Yes   • Stress Concern No   • Weight Concern No   • Special Diet No   • Back Care No   • Exercise Yes   • Bike Helmet No   • Seat Belt Yes   • Self-Exams Yes   Social History Narrative    Av is from [place], [place] and raised in [place]. He has been in [place] since 2004. 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. He retired in  his early 60's.  He has been  to Usha since 2003, they met through a mutual friend. He has a daughter (Kalina) and a step son (Jimi).     Social Determinants of Health     Financial Resource Strain:    • Difficulty of Paying Living Expenses:    Food Insecurity:    • Worried About Running Out of Food in the Last Year:    • Ran Out of Food in the Last Year:    Transportation Needs:    • Lack of Transportation (Medical):    • Lack of Transportation (Non-Medical):    Physical Activity:    • Days of Exercise per Week:    • Minutes of Exercise per Session:    Stress:    • Feeling of Stress :    Social Connections:    • Frequency of Communication with Friends and Family:    • Frequency of Social Gatherings with Friends and Family:    • Attends Taoist Services:    • Active Member of Clubs or Organizations:    • Attends Club or Organization Meetings:    • Marital Status:    Intimate Partner Violence:    • Fear of Current or Ex-Partner:    • Emotionally Abused:    • Physically Abused:    • Sexually Abused:         Family History:  Family History   Problem Relation Age of Onset   • Heart Disease Father         CAD   • Diabetes Father    • Cancer Mother    • Psychiatric Illness Mother         Depression   • Depression Mother    • Kidney stones Brother    • Heart Disease Brother    • Psychiatric Illness Brother         Depression   • Depression Brother    • Suicide Attempts Other    • Psychiatric Illness Other         autism       Depression and Opioid Screening  PHQ-9:  Depression Screen (PHQ-2/PHQ-9) 2/23/2021 3/15/2021 4/19/2021   PHQ-2 Total Score - 0 -   PHQ-2 Total Score - - -   PHQ-2 Total Score 0 - 0   PHQ-9 Total Score - 6 -     Interpretation of PHQ-9 Total Score   Score Severity   1-4 No Depression   5-9 Mild Depression   10-14 Moderate Depression   15-19 Moderately Severe Depression   20-27 Severe Depression     OBJECTIVE:   Vital Signs:  Vitals:    06/21/21 1159   BP: 122/60   Pulse: 90   Resp: 18   Temp:  36.3 °C (97.4 °F)   SpO2: 97%        Pertinent Labs:  Lab Results   Component Value Date/Time    SODIUM 143 02/25/2021 11:25 AM    POTASSIUM 3.9 02/25/2021 11:25 AM    CHLORIDE 107 02/25/2021 11:25 AM    CO2 26 02/25/2021 11:25 AM    GLUCOSE 111 (H) 02/25/2021 11:25 AM    BUN 25 (H) 02/25/2021 11:25 AM    CREATININE 1.33 02/25/2021 11:25 AM    CREATININE 1.4 05/28/2008 05:42 PM    BUNCREATRAT 10 03/27/2017 02:11 PM       Lab Results   Component Value Date/Time    HBA1C 7.2 (H) 02/25/2021 11:25 AM       Lab Results   Component Value Date/Time    WBC 8.1 02/25/2021 11:25 AM    RBC 4.73 02/25/2021 11:25 AM    HEMOGLOBIN 13.7 (L) 02/25/2021 11:25 AM    HEMATOCRIT 42.4 02/25/2021 11:25 AM    MCV 89.6 02/25/2021 11:25 AM    MCH 29.0 02/25/2021 11:25 AM    MCHC 32.3 (L) 02/25/2021 11:25 AM    MPV 10.5 02/25/2021 11:25 AM    NEUTSPOLYS 72.40 (H) 02/25/2021 11:25 AM    LYMPHOCYTES 13.40 (L) 02/25/2021 11:25 AM    MONOCYTES 7.10 02/25/2021 11:25 AM    EOSINOPHILS 5.50 02/25/2021 11:25 AM    BASOPHILS 0.70 02/25/2021 11:25 AM    HYPOCHROMIA 1+ 03/21/2012 01:08 PM       Lab Results   Component Value Date/Time    ASTSGOT 20 02/25/2021 11:25 AM    ALTSGPT 18 02/25/2021 11:25 AM        Physical Exam:   GEN: No apparent distress  HEENT: Head normocephalic, atraumatic.  Sclera nonicteric bilaterally, no ocular discharge appreciated bilaterally.  CV: Extremities warm and well-perfused  PULMONARY: Breathing nonlabored on room air, no respiratory accessory muscle use.  Not requiring supplemental oxygen.  SKIN: No appreciable skin breakdown on exposed areas of skin.  PSYCH: Mood and affect within normal limits.  NEURO: Awake alert.  Conversational.  Logical thought content.  Present in manual wheelchair.  Left AFO donned.    Tone on Modified Deuce Scale    L  L   Shoulder Adduction  Hip Flexion    Elbow Flexion 0 Hip Extension    Elbow Extension 1 Hip Adduction    Wrist Flexion 2 Knee Extension 2*   Finger Flexion 1 Knee Flexion 0      Dorsiflexion AFO     Plantar Flexion AFO   *Suspect some degree of underlying knee flexor contracture.    ASSESSMENT/PLAN: Av Bob  is a 76 y.o. male with rehabilitation history significant for R CVA 12/31/16 with residual left hemiparesis, general debility, here for evaluation. The following plan was discussed with the patient who is in agreement.     Visit Diagnoses     ICD-10-CM   1. Hemiplegia and hemiparesis following unspecified cerebrovascular disease affecting left non-dominant side ([facility])  I69.954   2. Spastic hemiplegia affecting left nondominant side, unspecified etiology ([facility])  G81.14   3. Other muscle spasm  M62.838        Wife assists with history.    Rehab/Neuro:   1. 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  States level of debility is out of proportion for how well he recovered after stroke. Even before norovirus virus, while in chemo was able to go to [place], no assistive device. Can use walker a little bit, but fatigues. Also can walk with cane with exercise  or therapy.  2. General debility: ?  CIP/CIM when acutely hospitalized several years ago with norovirus  3. Fatigue: Secondary to debility vs post stroke fatigue vs sleep apnea versus combination of all of the aforementioned.  Has BiPAP, does not tolerate BiPAP.  Also has low T, followed by endocrinology.  -Med management: On Plavix/ASA for stroke secondary prophylaxis  -Therapy: Continue therapy with OT/PT and exercise .  Agree with occupational therapist assessment that ultimately it is up to the patient to continue home exercise program to see continued improvement in function and range of motion.  Counseled on the aforementioned.    Spasticity: Significant in the left upper extremity, more minimal in the left lower extremity.  Affects wrist flexors primarily.  Concern for  hamstring spasticity limiting full range of motion of the left knee extension.  Left upper extremity elbow extension and wrist extension improved with Botox 5/11/2021.  -Med management: Continue to avoid pharmacologic treatment of spasticity given patient is tried medications with significant cognitive side effects.  -Referral: Physical medicine rehabilitation for repeat Botox injections as patient has had efficacy with last round.    Okay to continue antiplatelet with aspirin and Plavix through the procedure for botulinum toxin injection.  The risks of going off the medication outweigh the risks of doing the procedure on the medication.  I will plan to use 27-gauge needles and ultrasound guidance to avoid all blood vessels during the procedure.  We discussed that while on anticoagulation the patient does have an increased risk of bleeding and hematoma     Botox Plan: I plan to inject to the left upper extremity and left lower extremity  Location Botox Amount in Units   To be determined left upper and possibly left lower extremity  muscles to be determined at time of injection   Total Units  400 units       The risks benefits and alternatives to this procedure were discussed and the patient wishes to proceed with the procedure. Risks include but are not limited to damage to surrounding structures, infection, bleeding, worsening of pain which can be permanent, weakness which can be permanent. Benefits include pain relief, improved function. Alternatives includes not doing the procedure.      Neuropathic Pain/Nociceptive Pain: Right back/hip/leg pain with cramping/numbness - suspect potential lumbo-sacral radiculitis.  No significant motor deficit on the right.  Last lumbar MRI 2005.  Denies saddle anesthesia but does state have urinary incontinence which he cannot sense, but mostly has sensation of full bladder and bowel.  Complicated by PVD, patient declined angioplasty. MRI revealed diffuse disc bulge at L3-4,4-5.  1/2021 s/p right TESI L3-4, L4-5 with significant improvement.  Recent repeat injection 3/29/2021 which was not as efficacious as the first time.  -Follows with pain management upcoming appointment in July.      Follow up: Mid to late August for repeat Botox injections.    Total time spent was 10 minutes.  Included in this time is the time spent preparing for the visit including record review, my exam and evaluation, counseling and education regarding that which is aforementioned in the assessment and plan. Time was spent ordering the appropriate labs, tests, procedures, referrals, medications. Included this time as the time spent obtaining history from someone other than the patient. Discussion involved the patient and wife.    Please note that this dictation was created using voice recognition software. I have made every reasonable attempt to correct obvious errors but there may be errors of grammar and content that I may have overlooked prior to finalization of this note.    Dr. Lorie Huff DO, MS  Department of Physical Medicine & Rehabilitation  Neuro Rehabilitation Clinic  [facility]

## 2021-06-28 NOTE — TELEPHONE ENCOUNTER
Letter drafted and faxed to [facility].   Received fax confirmation.  Scanned documents into [employer].    Notified patient via [employer].

## 2021-06-28 NOTE — TELEPHONE ENCOUNTER
Patient is moderate risk of cardiovascular complications for low to moderate risk surgery or procedures.  He is medically optimized based on my last in person assessment, and if his symptoms have not changed, surgery should proceed without further cardiac work-up.  Aspirin should be continued throughout the surgical period if possible due to his known history of coronary artery disease. Plavix can be held for 7 days prior to the procedure.

## 2021-06-30 ENCOUNTER — PRE-ADMISSION TESTING (OUTPATIENT)
Dept: ADMISSIONS | Facility: MEDICAL CENTER | Age: 76
End: 2021-06-30
Attending: INTERNAL MEDICINE
Payer: MEDICARE

## 2021-06-30 ENCOUNTER — APPOINTMENT (OUTPATIENT)
Dept: ADMISSIONS | Facility: MEDICAL CENTER | Age: 76
End: 2021-06-30
Payer: MEDICARE

## 2021-06-30 DIAGNOSIS — Z01.810 PRE-OPERATIVE CARDIOVASCULAR EXAMINATION: ICD-10-CM

## 2021-06-30 DIAGNOSIS — Z01.812 PRE-OPERATIVE LABORATORY EXAMINATION: ICD-10-CM

## 2021-06-30 LAB
ANION GAP SERPL CALC-SCNC: 10 MMOL/L (ref 7–16)
BUN SERPL-MCNC: 26 MG/DL (ref 8–22)
CALCIUM SERPL-MCNC: 9.1 MG/DL (ref 8.5–10.5)
CHLORIDE SERPL-SCNC: 104 MMOL/L (ref 96–112)
CO2 SERPL-SCNC: 27 MMOL/L (ref 20–33)
CREAT SERPL-MCNC: 1.45 MG/DL (ref 0.5–1.4)
EKG IMPRESSION: NORMAL
EST. AVERAGE GLUCOSE BLD GHB EST-MCNC: 160 MG/DL
GLUCOSE SERPL-MCNC: 187 MG/DL (ref 65–99)
HBA1C MFR BLD: 7.2 % (ref 4–5.6)
POTASSIUM SERPL-SCNC: 4.3 MMOL/L (ref 3.6–5.5)
SODIUM SERPL-SCNC: 141 MMOL/L (ref 135–145)

## 2021-06-30 PROCEDURE — 93005 ELECTROCARDIOGRAM TRACING: CPT

## 2021-06-30 PROCEDURE — 93010 ELECTROCARDIOGRAM REPORT: CPT | Performed by: INTERNAL MEDICINE

## 2021-06-30 PROCEDURE — 36415 COLL VENOUS BLD VENIPUNCTURE: CPT

## 2021-06-30 PROCEDURE — 83036 HEMOGLOBIN GLYCOSYLATED A1C: CPT

## 2021-06-30 PROCEDURE — 80048 BASIC METABOLIC PNL TOTAL CA: CPT

## 2021-07-02 ENCOUNTER — OFFICE VISIT (OUTPATIENT)
Dept: ENDOCRINOLOGY | Facility: MEDICAL CENTER | Age: 76
End: 2021-07-02
Attending: NURSE PRACTITIONER
Payer: MEDICARE

## 2021-07-02 VITALS
HEART RATE: 78 BPM | WEIGHT: 200 LBS | OXYGEN SATURATION: 97 % | HEIGHT: 72 IN | BODY MASS INDEX: 27.09 KG/M2 | DIASTOLIC BLOOD PRESSURE: 78 MMHG | SYSTOLIC BLOOD PRESSURE: 114 MMHG

## 2021-07-02 DIAGNOSIS — Z79.4 TYPE 2 DIABETES MELLITUS WITH OTHER SPECIFIED COMPLICATION, WITH LONG-TERM CURRENT USE OF INSULIN (HCC): ICD-10-CM

## 2021-07-02 DIAGNOSIS — E11.69 TYPE 2 DIABETES MELLITUS WITH OTHER SPECIFIED COMPLICATION, WITH LONG-TERM CURRENT USE OF INSULIN (HCC): ICD-10-CM

## 2021-07-02 DIAGNOSIS — E11.69 HYPERLIPIDEMIA ASSOCIATED WITH TYPE 2 DIABETES MELLITUS (HCC): ICD-10-CM

## 2021-07-02 DIAGNOSIS — E78.5 HYPERLIPIDEMIA ASSOCIATED WITH TYPE 2 DIABETES MELLITUS (HCC): ICD-10-CM

## 2021-07-02 DIAGNOSIS — E11.21 DIABETIC NEPHROPATHY ASSOCIATED WITH TYPE 2 DIABETES MELLITUS (HCC): ICD-10-CM

## 2021-07-02 DIAGNOSIS — E55.9 VITAMIN D DEFICIENCY: ICD-10-CM

## 2021-07-02 PROCEDURE — 99211 OFF/OP EST MAY X REQ PHY/QHP: CPT | Performed by: NURSE PRACTITIONER

## 2021-07-02 PROCEDURE — 99214 OFFICE O/P EST MOD 30 MIN: CPT | Performed by: NURSE PRACTITIONER

## 2021-07-02 ASSESSMENT — FIBROSIS 4 INDEX: FIB4 SCORE: 1.178511301977579207

## 2021-07-02 NOTE — PROGRESS NOTES
CHIEF COMPLAINT: Patient is here for follow up of Type 2 Diabetes Mellitus, hyperlipidemia and Vitamin D deficiency.    Previously seen by Dr. Rasheed with last appointment on 2020.    HPI:     Av Bob is a 75 y.o. male with for continued evaluation & treatment of the followin.  Type 2 Diabetes Mellitus  Mr. Corea is a very pleasant 75-year-old male who is currently wheelchair-bound.  He is have increased back pain in the last several months.  He sees Dr. Bennett with pain management for routine steroid injections.    Current diabetes regimen:  Toujeo 28 units daily.  Humalog 5 units QD before largest meal.  Correction dosage is 2:50 greater than 150.  Trulicity to 3 mg weekly.    Farxiga-unable to tolerate due to yeast infections.    Serum glycohemoglobin 2021: 7.2%  Labs from 2021 HbA1c is 7.2%    BG Diary: 2021 patient brought in glucose log.  Patient checks home glucose 3-4 times daily.  AM Fasting   PM-140-180    Patient denies any hypoglycemic events.  Patient states is hypoglycemic aware.    Weight unchanged from last appointment.    Diabetes Complications   Retinopathy: Known retinopathy.  Last eye exam: 2021 with Dr. Cox.  Neuropathy: Positive for neuropathy in BLE. Denies any foot wounds.  Exercise: Minimal.  Diet: Fair.    Currently taking lisinopril 20 mg daily.  BP currently 114/78.  History of CKD III.  Microalbuminuria has improved well controlled at this time.  Currently being evaluated and treated by Dr. Jarvis.     Ref. Range 2/3/2020 14:16   Micro Alb Creat Ratio Latest Ref Range: 0 - 30 mg/g 16   Creatinine, Urine Latest Units: mg/dL 85.50   Microalbumin, Urine Random Latest Units: mg/dL 1.4       2.  Hyperlipidemia  Currently taking Lipitor 40 mg daily.  Patient denies muscle weakness and/or fatigue.     Ref. Range 2021 11:25   Cholesterol,Tot Latest Ref Range: 100 - 199 mg/dL 127   Triglycerides Latest Ref Range: 0  - 149 mg/dL 102   HDL Latest Ref Range: >=40 mg/dL 31 (A)   LDL Latest Ref Range: <100 mg/dL 76       3.  Vitamin D deficiency  Currently taking vitamin D 2000 units daily.     Ref. Range 5/7/2020 12:13   25-Hydroxy   Vitamin D 25 Latest Ref Range: 30 - 100 ng/mL 78       Patient's medications, allergies, and social histories were reviewed and updated as appropriate.    ROS:     CONS:     No fever, no chills   EYES:     No diplopia, no blurry vision   CV:           No chest pain, no palpitations   PULM:     No SOB, no cough, no hemoptysis.   GI:            No nausea, no vomiting, no diarrhea, no constipation   ENDO:     No polyuria, no polydipsia, no heat intolerance, no cold intolerance       Past Medical History:  Problem List:  2021-03: Dysphagia  2020-09: Right posterior hip pain and right leg cramping  2020-08: ASHLEY (obstructive sleep apnea)  2020-07: Low testosterone in male  2020-07: Nocturnal hypoxemia  2020-06: Chronic fatigue  2020-05: Essential hypertension, benign  2020-02: Polypharmacy  2020-01: Renal cyst  2020-01: Benign prostatic hyperplasia with urinary obstruction  2020-01: Roberto hematuria  2020-01: Influenza A  2020-01: Leg edema, right  2020-01: Altered mobility due to old stroke  2020-01: Hematuria  2020-01: Renal mass  2020-01: Sepsis ([facility])  2020-01: Normocytic anemia  2020-01: Acute on chronic renal failure ([facility])  2020-01: Hydronephrosis  2019-11: History of renal calculi  2019-11: Neurogenic bladder  2019-10: PVD (peripheral vascular disease) ([facility])  2019-07: Diarrhea  2019-06: Acute cystitis with hematuria  2019-05: Snapping hip syndrome, right  2019-05: Strain of flexor muscle of right hip  2019-05: Strain of right hamstring  2019-05: Muscle strain of right gluteal region  2019-05: Left hand weakness  2019-02: Urinary incontinence  2018-12: Chronic ulcer of left lower extremity with fat layer exposed   ([facility])  2018-12: ([facility]) Chronic ulcer of right lower extremity with fat layer    exposed  2018-12: ([facility]) Tibial artery disease  2018-11: H/O TIA (transient ischemic attack) and stroke  2018-10: GERD with esophagitis  2018-04: Recurrent UTI  2018-04: Dyslipidemia  2018-01: Depression  2018-01: Acute kidney injury ([facility])  2018-01: Microalbuminuria due to type 2 diabetes mellitus ([facility])  2017-12: ([facility]) Left hemiparesis  2017-12: Adjustment disorder with depressed mood  2017-11: Enterococcus UTI  2017-11: Common cold  2017-11: ([facility]) Diabetic nephropathy associated with type 2 diabetes   mellitus  2017-11: Acute prerenal azotemia  2017-11: Loose stools  2017-11: Psychophysiological insomnia  2017-11: ([facility]) Moderate episode of recurrent major depressive disorder  2017-11: Wheelchair dependent  2017-08: Enterococcal septicemia ([facility])  2017-08: Hypomagnesemia  2017-07: NSTEMI (non-ST elevated myocardial infarction) ([facility])  2017-07: Obstruction of left ureteropelvic junction due to stone  2017-05: Paroxysmal atrial fibrillation ([facility])  2017-05: Acute respiratory failure with hypoxia ([facility])  2017-05: Iron deficiency anemia  2017-05: Septic shock ([facility])  2017-05: H/O non-Hodgkin's lymphoma  2017-03: ([facility]) Hypertension associated with diabetes  2017-01: Abnormal brain MRI  2016-12: Type 2 diabetes mellitus, with long-term current use of   insulin ([facility])  2016-12: H/O Cerebrovascular accident (CVA) in adulthood  2016-12: Coronary artery disease involving native coronary artery  2016-08: DM (diabetes mellitus) type II controlled, neurological   manifestation ([facility])  2016-08: Stage 3 chronic kidney disease ([facility])  2016-08: Long-term use of high-risk medication  2015-11: Skin ulcer of calf (CMS-[facility])  2014-05: History of myocardial infarction  2014-01: Diabetes mellitus, type 2 ([facility])  2014-01: Idiopathic chronic gout of multiple sites without tophus  2013-09: Type II diabetes mellitus, uncontrolled ([facility])  2013-09: Type II or unspecified type diabetes mellitus with   neurological manifestations,  uncontrolled(250.62)  2013-09: Diabetic polyneuropathy (CMS-HCC)  2013-09: Nephritis and nephropathy, with pathological lesion in kidney  2013-09: Encounter for long-term (current) use of insulin (CMS-HCC)  2012-12: Hypokalemia  2011-12: Presence of BMS and drug coated stents in LAD coronary artery  2011-12: Presence of drug coated stents in left circumflex coronary   artery  2011-12: Status post percutaneous transluminal coronary angioplasty  2011-12: ([facility]) Hyperlipidemia associated with type 2 diabetes mellitus  2011-08: Ulcer of lower limb ([facility])  Myocardial infarction ([facility])  Degeneration of cervical intervertebral disc  History of nephrolithiasis      Past Surgical History:  Past Surgical History:   Procedure Laterality Date   • LUMBAR TRANSFORAMINAL EPIDURAL STEROID INJECTION Right 3/29/2021    Procedure: RIGHT L3-4 and L4-5 transforaminal epidural steroid injection with fluoroscopic guidance;  Surgeon: Harpal Benntet M.D.;  [facility];  Service: Pain Management   • LUMBAR TRANSFORAMINAL EPIDURAL STEROID INJECTION Right 11/2/2020    Procedure: INJECTION, STEROID, SPINE, LUMBAR, EPIDURAL, TRANSFORAMINAL APPROACH;  Surgeon: Harpal Bennett M.D.;  Location: [facility];  Service: Pain Management   • CYSTOSCOPY STENT PLACEMENT Left 2/12/2018    Procedure: CYSTOSCOPY  ;  Surgeon: Rey Barry M.D.;  Location: SURGERY [facility];  Service: Urology   • URETEROSCOPY Left 2/12/2018    Procedure: URETEROSCOPY- FLEXIBLE  ;  Surgeon: Rey Barry M.D.;  Location: SURGERY [facility];  Service: Urology   • LASERTRIPSY Left 2/12/2018    Procedure: LASERTRIPSY - LITHO  ;  Surgeon: Rey Barry M.D.;  Location: SURGERY [facility];  Service: Urology   • WOUND CLOSURE GENERAL  4/3/2012    Performed by GERHARD GILBERT at SURGERY SAME DAY [facility] ORS   • [facility] CARDIAC CATH  2009    Stents to LAD, Om   • DAGOBERTO BY LAPAROSCOPY  1998   • ANGIOPLASTY  1997    RCA  followed by other stents as noted above.    • [facility] CARDIAC CATH  1997    stent RCA   • CATARACT EXTRACTION WITH IOL      bilateral   • LITHOTRIPSY     • TONSILLECTOMY     • TONSILLECTOMY AND ADENOIDECTOMY          Allergies:  Diphenhydramine hcl, Lorazepam, Ciprofloxacin, and Spironolactone     Social History:  Social History     Tobacco Use   • Smoking status: Never Smoker   • Smokeless tobacco: Never Used   • Tobacco comment: continued abstinence   Vaping Use   • Vaping Use: Never used   Substance Use Topics   • Alcohol use: Never   • Drug use: Never        Family History:   family history includes Cancer in his mother; Depression in his brother and mother; Diabetes in his father; Heart Disease in his brother and father; Kidney stones in his brother; Psychiatric Illness in his brother, mother, and another family member; Suicide Attempts in an other family member.      PHYSICAL EXAM:   OBJECTIVE:  Vital signs: /78 (BP Location: Left arm, Patient Position: Sitting, BP Cuff Size: Adult)   Pulse 78   Ht 1.829 m (6')   Wt 90.7 kg (200 lb) Comment: patient is in wheelchair  SpO2 97%   BMI 27.12 kg/m²   GENERAL: Well-developed, well-nourished in no apparent distress.   EYE:  No ocular asymmetry, PERRLA  HENT: Pink, moist mucous membranes.    NECK: No thyromegaly.   CARDIOVASCULAR:  No murmurs  LUNGS: Clear breath sounds  ABDOMEN: Soft, nontender   EXTREMITIES: No clubbing, cyanosis, or edema.   NEUROLOGICAL: No gross focal motor abnormalities   LYMPH: No cervical adenopathy palpated.   SKIN: No rashes, lesions.     ASSESSMENT/PLAN:   1. Type 2 diabetes mellitus with other specified complication, with long-term current use of insulin ([facility])  Stable.  Continue diabetes regimen:  Toujeo 28 units daily.  Humalog 5 units QD before largest meal.  Correction dosage is 2:50 greater than 150.  Trulicity to 3 mg weekly.    Continue daily monitoring of glucose levels at home 2-3 times a day.  Continue daily foot  inspections.  Continue activity as tolerated pending back pain or discomfort.  Encouraged to drink 2 to 3 L of water each day.  Annual dilated eye exam is complete until next year.    2. (HCC) Hyperlipidemia associated with type 2 diabetes mellitus  Stable.  Continue Lipitor 40 mg daily.    3. Vitamin D deficiency  Stable.  Continue vitamin D 2000 IU daily.       Repeat point-of-care A1c at next appointment.  Complete lab draw 1 week prior to next appointment.  Next appointment in 4 months.    Thank you kindly for allowing me to participate in the diabetes care plan for this patient.    oL Baxter, APRN  07/02/2021    CC:   Madison Bunch D.O.

## 2021-07-12 ENCOUNTER — OFFICE VISIT (OUTPATIENT)
Dept: MEDICAL GROUP | Facility: PHYSICIAN GROUP | Age: 76
End: 2021-07-12
Payer: MEDICARE

## 2021-07-12 VITALS
DIASTOLIC BLOOD PRESSURE: 62 MMHG | OXYGEN SATURATION: 97 % | WEIGHT: 205.1 LBS | RESPIRATION RATE: 16 BRPM | TEMPERATURE: 97.5 F | HEIGHT: 70 IN | BODY MASS INDEX: 29.36 KG/M2 | HEART RATE: 69 BPM | SYSTOLIC BLOOD PRESSURE: 124 MMHG

## 2021-07-12 DIAGNOSIS — E11.69 TYPE 2 DIABETES MELLITUS WITH OTHER SPECIFIED COMPLICATION, WITH LONG-TERM CURRENT USE OF INSULIN (HCC): ICD-10-CM

## 2021-07-12 DIAGNOSIS — S91.209A TOENAIL AVULSION, INITIAL ENCOUNTER: ICD-10-CM

## 2021-07-12 DIAGNOSIS — Z79.4 TYPE 2 DIABETES MELLITUS WITH OTHER SPECIFIED COMPLICATION, WITH LONG-TERM CURRENT USE OF INSULIN (HCC): ICD-10-CM

## 2021-07-12 DIAGNOSIS — R13.10 DYSPHAGIA, UNSPECIFIED TYPE: ICD-10-CM

## 2021-07-12 PROCEDURE — 99214 OFFICE O/P EST MOD 30 MIN: CPT | Performed by: INTERNAL MEDICINE

## 2021-07-12 ASSESSMENT — FIBROSIS 4 INDEX: FIB4 SCORE: 1.178511301977579207

## 2021-07-12 NOTE — PROGRESS NOTES
Subjective:   Chief Complaint/History of Present Illness:  Av Bob is a 76 y.o. male established patient who presents today to discuss medical problems as listed below. Av is accompanied by his significant other, Usha.    Problem   Toenail avulsion, initial encounter- left foot 3rd digit    They report that his toenail spontaneously came off last week.  He does not recall specific injury or any pain.  His significant other saw the nail on the floor with dried blood on his foot.  She cleaned the wound and covered it with gauze.  On follow-up in clinic today the gauze has stuck to the wound bed but with saline was fairly easy to remove the dried gauze.  There was scant amount of bright red blood at the prior attachment of the toenail.  No surrounding infection.  He does not have any pain due to baseline neuropathy.     Dysphagia    He reports progressive worsening of his swallow function.  He notices at least once per week he is choking and coughing, can be with pills or can be with food.  The episodes are quite frightening and he takes a bit of time for him to recover.  He has a prior history of stroke and recalls working with speech-language pathology at that time but does not remember if he did any formal esophagrams or swallow studies.  He would be interested in completing a modified barium swallow study to better characterize the cause of his swallowing difficulty.  No odynophagia.  No melena or hematochezia noted.  No concerning medicines for pill esophagitis.    UES dysfunction noted on fluoro modified barium swallow, referred to GI. Scheduled for gastroscopy 7/21/2021 with Dr. [facility].     Type 2 Diabetes Mellitus, With Long-Term Current Use of Insulin (Hcc)       Ref. Range 6/19/2020 2/25/2021 6/30/2021   Glycohemoglobin Latest Ref Range: 4.0 - 5.6 % 5.8 (H) 7.2 (H) 7.2 (H)   Estim. Avg Glu Latest Units: mg/dL 120 160 160     Longstanding history of type 2 diabetes on  insulin.  Previously followed with endocrinology, Dr. Rasheed.     Current regimen includes Trulicity 3 mg weekly,Toujeo 28 units daily, Humalog 5 units for sugars between 101-150, increase by 2 units if greater than 150.  Correction dosage is 2: 50 greater than 150.    Complicated by retinal involvement, neuropathy, CKDIII, and microalbuminuria.            Current Medications:  Current Outpatient Medications Ordered in Epic   Medication Sig Dispense Refill   • metoprolol SR (TOPROL XL) 100 MG TABLET SR 24 HR Take 1 tablet by mouth every day. 90 tablet 3   • losartan (COZAAR) 50 MG Tab Take 1 tablet by mouth 2 times a day. 180 tablet 3   • fenofibrate (TRICOR) 145 MG Tab Take 1 tablet by mouth every day. 90 tablet 3   • clopidogrel (PLAVIX) 75 MG Tab Take 1 tablet by mouth every day. 90 tablet 3   • atorvastatin (LIPITOR) 40 MG Tab Take 1 tablet by mouth every evening. 90 tablet 3   • amLODIPine (NORVASC) 5 MG Tab Take 1 tablet by mouth every day. 90 tablet 3   • allopurinol (ZYLOPRIM) 100 MG Tab TAKE 1 TABLET BY MOUTH EVERY  tablet 3   • gabapentin (NEURONTIN) 100 MG Cap Take 1-3 Capsules by mouth at bedtime as needed (pain). 90 capsule 3   • potassium chloride (KLOR-CON) 8 MEQ tablet TAKE 1 TABLET BY MOUTH EVERY DAY 90 tablet 2   • Dulaglutide 3 MG/0.5ML Solution Pen-injector Inject 3 mg under the skin every 7 days. 0.5 mL 3   • triamcinolone acetonide (KENALOG) 0.1 % Cream      • glucose blood strip OneTouch Ultra Blue Test Strip   U TO TEST TID     • insulin lispro (HUMALOG) 100 UNIT/ML Solution Pen-injector injection PEN INJECT 20 UNITS UNDER THE SKIN AS INSTRUCTED DAILY 96 mL 2   • Insulin Pen Needle 32 G x 4 mm (BD PEN NEEDLE SAWTI U/F) USE FOR INSULIN SHOTS FIVE TIMES DAILY 500 Each 3   • fluoxetine (PROZAC) 40 MG capsule Take 1 Cap by mouth every day. 90 Cap 3   • Multiple Vitamin (MULTI VITAMIN MENS PO) Take  by mouth.     • aspirin EC (ECOTRIN) 81 MG Tablet Delayed Response Take 81 mg by mouth  "every day.     • Probiotic Product (PROBIOTIC DAILY PO) Take 1 Cap by mouth 2 Times a Day.     • magnesium oxide (MAG-OX) 400 MG Tab Take 400 mg by mouth every day.     • finasteride (PROSCAR) 5 MG Tab Take 1 Tab by mouth every day. 30 Tab 0   • alfuzosin (UROXATRAL) 10 MG SR tablet Take 10 mg by mouth every day.     • methenamine hip (HIPPREX) 1 GM Tab Take 1 g by mouth 2 times a day.     • Insulin Glargine (TOUJEO MAX SOLOSTAR) 300 UNIT/ML Solution Pen-injector Inject 28 Units as instructed every bedtime.     • acetaminophen (TYLENOL) 500 MG Tab Take 1,000 mg by mouth every 6 hours as needed for Mild Pain or Moderate Pain.     • Cholecalciferol (VITAMIN D) 2000 units Cap Take 4,000 Units by mouth 2 Times a Day. Once a day       No current [facility]-ordered facility-administered medications on file.          Objective:   Physical Exam:    Vitals: /62 (BP Location: Right arm, Patient Position: Sitting, BP Cuff Size: Adult)   Pulse 69   Temp 36.4 °C (97.5 °F) (Temporal)   Resp 16   Ht 1.765 m (5' 9.5\")   Wt 93 kg (205 lb 1.6 oz)   SpO2 97%    BMI: Body mass index is 29.85 kg/m².  Physical Exam  Eyes:      Extraocular Movements: Extraocular movements intact.   Cardiovascular:      Rate and Rhythm: Normal rate and regular rhythm.      Heart sounds: No murmur heard.     Musculoskeletal:      Comments: Trace bilateral lower extremity edema, pretibial   Skin:     General: Skin is warm and dry.      Comments: Left foot 3rd digit with absent toenail and small healing abrasion at site of previous toenail attachment   Psychiatric:         Mood and Affect: Mood normal.         Behavior: Behavior normal.         Thought Content: Thought content normal.         Judgment: Judgment normal.               Assessment and Plan:   Av is a 76 y.o. male with the following:  Problem List Items Addressed This Visit     Type 2 diabetes mellitus, with long-term current use of insulin (HCC)     Chronic and stable problem.  " Previously overtreated with insulin with A1c down to 5.8 and several episodes of hypoglycemia.  Since his regimen has been adjusted his blood sugar is now averaging 160 with an A1c of 7.2.  No longer having hypoglycemia.  He would like to try to limit some of his specialty appointment so I have agreed that I can take over the diabetes management and refer him back to endocrinology if needed in the future.  Continue current regiment of Trulicity, Toujeo, and Humalog in the meantime.  Advised him to reach out to me if he has any difficulty affording his medications moving forward.  He is appropriately on ARB, statin, and aspirin.         Dysphagia     Chronic and ongoing problem.  He had an abnormal modified barium swallow especially noted with upper esophageal sphincter dysfunction.  He is [facility] who plans to complete gastroscopy for additional evaluation, this is scheduled for July 21.  Patient will keep me updated on the findings.         Toenail avulsion, initial encounter- left foot 3rd digit     New problem.  I performed wound care in the office by irrigating with saline to help get the dried gauze off the wound bed.  Evaluated the toe where the toenail attachment previously was.  Scant amount of bright red blood but otherwise appears to be healing appropriately.  Wrapped the toe with nonstick gauze and gave several nonstick gauze pads to the patient and his significant other to use in the meantime.  I suspect it will be healed enough by the end of the week that they can leave it open to air.  He does have type 2 diabetes so we will need to monitor carefully for complications such as infection.                RTC: Return in about 3 months (around 10/12/2021).    I spent a total of 39 minutes with record review, exam, communication with the patient, communication with other providers, and documentation of this encounter.    PLEASE NOTE: This dictation was created using voice  recognition software. I have made every reasonable attempt to correct obvious errors, but I expect that there are errors of grammar and possibly content that I did not discover before finalizing the note.      Madison Bunch, DO  Geriatric and Internal Medicine  [facility]

## 2021-07-13 NOTE — ASSESSMENT & PLAN NOTE
Chronic and stable problem.  Previously overtreated with insulin with A1c down to 5.8 and several episodes of hypoglycemia.  Since his regimen has been adjusted his blood sugar is now averaging 160 with an A1c of 7.2.  No longer having hypoglycemia.  He would like to try to limit some of his specialty appointment so I have agreed that I can take over the diabetes management and refer him back to endocrinology if needed in the future.  Continue current regiment of Trulicity, Toujeo, and Humalog in the meantime.  Advised him to reach out to me if he has any difficulty affording his medications moving forward.  He is appropriately on ARB, statin, and aspirin.

## 2021-07-13 NOTE — ASSESSMENT & PLAN NOTE
Chronic and ongoing problem.  He had an abnormal modified barium swallow especially noted with upper esophageal sphincter dysfunction.  He is [facility] who plans to complete gastroscopy for additional evaluation, this is scheduled for July 21.  Patient will keep me updated on the findings.

## 2021-07-13 NOTE — ASSESSMENT & PLAN NOTE
New problem.  I performed wound care in the office by irrigating with saline to help get the dried gauze off the wound bed.  Evaluated the toe where the toenail attachment previously was.  Scant amount of bright red blood but otherwise appears to be healing appropriately.  Wrapped the toe with nonstick gauze and gave several nonstick gauze pads to the patient and his significant other to use in the meantime.  I suspect it will be healed enough by the end of the week that they can leave it open to air.  He does have type 2 diabetes so we will need to monitor carefully for complications such as infection.

## 2021-07-15 DIAGNOSIS — F33.41 RECURRENT MAJOR DEPRESSIVE DISORDER, IN PARTIAL REMISSION (HCC): ICD-10-CM

## 2021-07-15 RX ORDER — FLUOXETINE HYDROCHLORIDE 40 MG/1
CAPSULE ORAL
Qty: 90 CAPSULE | Refills: 3 | Status: SHIPPED | OUTPATIENT
Start: 2021-07-15 | End: 2022-08-15

## 2021-07-20 ENCOUNTER — PATIENT MESSAGE (OUTPATIENT)
Dept: CARDIOLOGY | Facility: MEDICAL CENTER | Age: 76
End: 2021-07-20

## 2021-07-20 ENCOUNTER — OFFICE VISIT (OUTPATIENT)
Dept: PHYSICAL MEDICINE AND REHAB | Facility: MEDICAL CENTER | Age: 76
End: 2021-07-20
Payer: MEDICARE

## 2021-07-20 VITALS
WEIGHT: 205 LBS | OXYGEN SATURATION: 97 % | HEIGHT: 70 IN | HEART RATE: 68 BPM | DIASTOLIC BLOOD PRESSURE: 80 MMHG | TEMPERATURE: 97.2 F | BODY MASS INDEX: 29.35 KG/M2 | SYSTOLIC BLOOD PRESSURE: 126 MMHG

## 2021-07-20 DIAGNOSIS — R25.2 SPASTICITY: ICD-10-CM

## 2021-07-20 DIAGNOSIS — M62.838 OTHER MUSCLE SPASM: ICD-10-CM

## 2021-07-20 DIAGNOSIS — I69.954 HEMIPLEGIA AND HEMIPARESIS FOLLOWING UNSPECIFIED CEREBROVASCULAR DISEASE AFFECTING LEFT NON-DOMINANT SIDE (HCC): ICD-10-CM

## 2021-07-20 DIAGNOSIS — M54.16 LUMBAR RADICULOPATHY: ICD-10-CM

## 2021-07-20 DIAGNOSIS — G81.14 SPASTIC HEMIPLEGIA AFFECTING LEFT NONDOMINANT SIDE, UNSPECIFIED ETIOLOGY (HCC): ICD-10-CM

## 2021-07-20 PROCEDURE — 99214 OFFICE O/P EST MOD 30 MIN: CPT | Performed by: PHYSICAL MEDICINE & REHABILITATION

## 2021-07-20 ASSESSMENT — PATIENT HEALTH QUESTIONNAIRE - PHQ9: CLINICAL INTERPRETATION OF PHQ2 SCORE: 0

## 2021-07-20 ASSESSMENT — FIBROSIS 4 INDEX: FIB4 SCORE: 1.178511301977579207

## 2021-07-20 ASSESSMENT — PAIN SCALES - GENERAL: PAINLEVEL: 3=SLIGHT PAIN

## 2021-07-20 NOTE — H&P (VIEW-ONLY)

## 2021-07-20 NOTE — Clinical Note
Addie     Av has not tolerated many other neuropathic pain medications.  You think he would benefit from Zanaflex or baclofen.  He does have spasticity as well.  I am hesitant to start anything for him given his fatigue.  We discussed surgery and he may need this for his back but given his medical comorbidities going to try another epidural first.     Harpal

## 2021-07-20 NOTE — OR NURSING
COVID-19 Pre-surgery screenin. Do you have an undiagnosed respiratory illness or symptoms such as coughing or sneezing? NO (Yes/No)  a. Onset of Sx -  b. Acute vs. chronic respiratory illness -     2. Do you have an unexplained fever greater than 100.4 degrees Fahrenheit or 38 degrees Celsius?                     NO (Yes/No)     3. Have you had direct exposure to a patient who tested positive for Covid-19?                          NO (Yes/No)     4. Have you had any loss of your sense of taste or smell? Have you had N/V or sore throat? NO     Patient has been informed of visitor policy and asked to wear a mask upon entering the hospital   YES (Yes/No)

## 2021-07-20 NOTE — PATIENT INSTRUCTIONS
Your procedure will be at the [facility] special procedure suite.    [facility] [place] [address]       PRE-PROCEDURE INSTRUCTIONS  You may take your regular medications except:   · No Anti-inflammatories 5 days prior to your procedure. Anti-inflammatories include medicines such as  ibuprofen (Motrin, Advil), Excedrin, Naproxen (Aleve, Anaprox, Naprelan, Naprosyn), Celecoxib (Celebrex), Diclofenac (Voltaren-XR tab), and Meloxicam (Mobic).   · You can take the remainder of your pain medications as prescribed.   · If you are having a diagnostic procedure such as a medial branch block, do not use her pain meds on the day of the procedure  · No Glucophage or Metformin 24 hours before your procedure. You may resume next day after your procedure.  · Call the physiatry office if you are taking or prescribed anti-biotics within five days of procedure.  · Please ask provider if you are taking any new diabetes medication.  · CONTINUE TAKING BLOOD PRESSURE MEDICATIONS AS PRESCRIBED.  · Pain medications will not be prescribed on the procedure day. Procedural pain medication may be used by your provider   · Call your doctor's office performing the procedure if you have a fever, chills, rash or new illness prior to your procedure    Anticoagulation/antiplatelet medications  No Blood thinning medications such as Coumadin, Xarelto, aspirin or Plavix 5 days prior to procedure unless your doctor said to continue these medications. Call your doctor if a new medication is prescribed in this class.     Restrictions for eating before procedure:   · If you are getting procedural sedation, then do not eat to for 8 hours prior to procedure appointment time. Do not drink fluids for four hours prior to your procedure time.   · If you are not having procedural sedation, then Skip the meal prior to your procedure. If you have a morning procedure then skip breakfast. If you have an afternoon procedure then skip lunch.   · You  may drink clear liquids up to 2 hours prior to your procedure  · You must have a  the day of procedure to accompany you home.      POST PROCEDURE INSTRUCTIONS   · No heavy lifting, strenuous bending or strenuous exercise for 3 days after your procedure.  · No hot tubs, baths, swimming for 3 days after your procedure  · You can remove the bandage the day after the procedure.  · IF YOU RECEIVED A STEROID INJECTION. PLEASE NOTE THAT THERE MAY BE A DELAY FOR THE INJECTION TO START WORKING, THE DELAY MAY BE UP TO TWO WEEKS. IF YOU HAVE DIABETES, PLEASE NOTE THAT YOUR SUGAR LEVELS MAY BE ELEVATED FOR 1-2 DAYS AFTER A STEROID INJECTION.  THE STEROID MAY CAUSE TEMPORARY SYMPTOMS WHICH USUALLY RESOLVE ON THEIR OWN WITHIN 1 TO 2 DAYS INCLUDING FACIAL FLUSHING OR A FEELING OF WARMTH ON THE FACE, TEMPORARY INCREASES IN BLOOD SUGAR, INSOMNIA, INCREASED HUNGER  · IF YOU EXPERIENCE PROLONGED WEAKNESS LONGER THAN ONE DAY, BOWEL OR BLADDER INCONTINENCE THEN PLEASE CALL THE PHYSIATRY OFFICE.  · Your leg may feel heavy, weak and numb for up to 1-2 days. Be very careful walking.   ·  You may resume normal activities 3 days after procedure.

## 2021-07-20 NOTE — PROGRESS NOTES
Follow up patient note  Interventional spine and sports physiatry, Physical medicine rehabilitation      Chief complaint:   Chief Complaint   Patient presents with   • Follow-Up     back pain           HISTORY    Please see new patient note by Dr Bennett,  for more details.     HPI  Patient identification: Av Bob ,  1945,   With Diagnoses of Lumbar radiculopathy, Spastic hemiplegia affecting left nondominant side, unspecified etiology (HCC), Other muscle spasm, Hemiplegia and hemiparesis following unspecified cerebrovascular disease affecting left non-dominant side (HCC), and Spasticity were pertinent to this visit.     Procedures:  2020 right L3-4 and L4-5 transforaminal epidural steroid injection 95% pain relief and follow-up visit  3/29/2021 right L3-4 and L4-5 transforaminal epidural steroid injection      Patient is having intermittent right low back pain rating down the leg.  Leg pain is worse than the back pain.  This radiates down the anterior and posterior medial aspect of the right leg towards the foot and ankle and towards the great toe.  This is moderate to severe in intensity and worse overnight.  There is typically only mild intensity during the day.    Medication management includes gabapentin as well as antispastic medications.  He is also getting Botox treatments.  The gabapentin does help with the pain.  He uses between 103 100 mg nightly.  When he does use 300 mg this causes significant sedative side effects throughout the next day.  He has been quite sensitive to the gabapentin. Pain is worse with laying down. He does his home exercise and stretching routine daily.  This is provider and physical therapy treatment.       ROS Red Flags :   Fever, Chills, Sweats: Denies  Involuntary Weight Loss: Denies  Bowel/Bladder Incontinence: Denies  Saddle Anesthesia: Denies        PMHx:   Past Medical History:   Diagnosis Date   • (HCC) Chronic ulcer of right lower extremity with  fat layer exposed 12/27/2018   • Acute on chronic renal failure ([facility]) 1/21/2020   • Acute respiratory failure with hypoxia ([facility]) 5/20/2017   • CAD (coronary artery disease)     GIOVANNA to RCA in '97, GIOVANNA X2 to LAD and GIOVANNA X2 to OM in '09   • Cancer ([facility])     2017; chemo lympoma non hodgkins lymphoma   • Cataract     dez iol   • Cerebrovascular accident (CVA) ([facility]) 12/30/2016    Left arm weakness  etiology of stroke not established, lymphoma discovered on MRI evaluation of stroke, L hemiparesis much worse after acute infectious illness in mid 2017, but no specific diagnosed recurrent neurological etiology, all at [facility]   • Chickenpox    • CKD (chronic kidney disease) stage 3, GFR 30-59 ml/min ([facility])    • Controlled gout 2014   • Coronary atherosclerosis of native coronary artery     S/P PTCA (percutaneous transluminal coronary angioplasty), RCA, 5/1997, patent on cath 7/10/2009 at the time of interventions on his left anterior descending and circumflex coronary arteries   • Daytime sleepiness    • Depression    • Diabetes ([facility])    • Difficulty swallowing    • Enterococcal septicemia ([facility]) 8/12/2017   • Roberto hematuria 1/29/2020   • Frequent urination    • GERD (gastroesophageal reflux disease)    • [place] measles    • Hypertension 06/30/2021    pt states well controlled on meds   • Hypokalemia 2012    controlled with combination of ACE inhibitor or ARB plus spironolactone   • Hypomagnesemia 08/12/2017    etiology uncertain   • Influenza A 1/26/2020   • Insomnia    • Kidney stone    • Leg edema, right 1/22/2020   • 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 Barry   • Normocytic hypochromic anemia 5/20/2017   • NSTEMI (non-ST elevated myocardial infarction) ([facility]) 07/18/2017    complicating UTI with sepsis   • Pain 06/30/2021    right leg foot   • Peripheral vascular disease ([facility])    •  Polyneuropathy in diabetes(357.2) 9/11/2013   • Renal mass 1/21/2020   • Septic shock ([facility]) 5/20/2017   • Skin ulcer of calf ([facility]) 2015    Right, Dr. Terry and wound care   • Stroke ([facility]) 2016    left sided weakness   • Urinary bladder disorder    • Urinary incontinence     pt wears pads   • Weakness    • Wound of left leg 2012    Requiring surgery and debridment, Dr. Moore       PSHx:   Past Surgical History:   Procedure Laterality Date   • LUMBAR TRANSFORAMINAL EPIDURAL STEROID INJECTION Right 3/29/2021    Procedure: RIGHT L3-4 and L4-5 transforaminal epidural steroid injection with fluoroscopic guidance;  Surgeon: Harpal Bennett M.D.;  Location: [facility];  Service: Pain Management   • LUMBAR TRANSFORAMINAL EPIDURAL STEROID INJECTION Right 11/2/2020    Procedure: INJECTION, STEROID, SPINE, LUMBAR, EPIDURAL, TRANSFORAMINAL APPROACH;  Surgeon: Harpal Bennett M.D.;  Location: [facility];  Service: Pain Management   • CYSTOSCOPY STENT PLACEMENT Left 2/12/2018    Procedure: CYSTOSCOPY  ;  Surgeon: Rey Barry M.D.;  Location: SURGERY [facility];  Service: Urology   • URETEROSCOPY Left 2/12/2018    Procedure: URETEROSCOPY- FLEXIBLE  ;  Surgeon: Rey Barry M.D.;  Location: SURGERY [facility];  Service: Urology   • LASERTRIPSY Left 2/12/2018    Procedure: LASERTRIPSY - LITHO  ;  Surgeon: Rey Barry M.D.;  Location: SURGERY [facility];  Service: Urology   • WOUND CLOSURE GENERAL  4/3/2012    Performed by GERHARD MOORE at SURGERY SAME DAY [facility] ORS   • Z CARDIAC CATH  2009    Stents to LAD, Om   • DAGOBERTO BY LAPAROSCOPY  1998   • ANGIOPLASTY  1997    RCA followed by other stents as noted above.    • ZZZ CARDIAC CATH  1997    stent RCA   • CATARACT EXTRACTION WITH IOL      bilateral   • LITHOTRIPSY     • TONSILLECTOMY     • TONSILLECTOMY AND ADENOIDECTOMY         Family history   Family History   Problem Relation Age of Onset   • Heart Disease  Father         CAD   • Diabetes Father    • Cancer Mother    • Psychiatric Illness Mother         Depression   • Depression Mother    • Kidney stones Brother    • Heart Disease Brother    • Psychiatric Illness Brother         Depression   • Depression Brother    • Suicide Attempts Other    • Psychiatric Illness Other         autism         Medications:   Outpatient Medications Marked as Taking for the 7/20/21 encounter (Office Visit) with Harpal Bennett M.D.   Medication Sig Dispense Refill   • fluoxetine (PROZAC) 40 MG capsule TAKE 1 CAPSULE BY MOUTH EVERY DAY 90 capsule 3   • metoprolol SR (TOPROL XL) 100 MG TABLET SR 24 HR Take 1 tablet by mouth every day. 90 tablet 3   • losartan (COZAAR) 50 MG Tab Take 1 tablet by mouth 2 times a day. 180 tablet 3   • fenofibrate (TRICOR) 145 MG Tab Take 1 tablet by mouth every day. 90 tablet 3   • clopidogrel (PLAVIX) 75 MG Tab Take 1 tablet by mouth every day. 90 tablet 3   • atorvastatin (LIPITOR) 40 MG Tab Take 1 tablet by mouth every evening. 90 tablet 3   • amLODIPine (NORVASC) 5 MG Tab Take 1 tablet by mouth every day. 90 tablet 3   • allopurinol (ZYLOPRIM) 100 MG Tab TAKE 1 TABLET BY MOUTH EVERY  tablet 3   • gabapentin (NEURONTIN) 100 MG Cap Take 1-3 Capsules by mouth at bedtime as needed (pain). 90 capsule 3   • potassium chloride (KLOR-CON) 8 MEQ tablet TAKE 1 TABLET BY MOUTH EVERY DAY 90 tablet 2   • Dulaglutide 3 MG/0.5ML Solution Pen-injector Inject 3 mg under the skin every 7 days. 0.5 mL 3   • triamcinolone acetonide (KENALOG) 0.1 % Cream      • Multiple Vitamin (MULTI VITAMIN MENS PO) Take  by mouth.     • aspirin EC (ECOTRIN) 81 MG Tablet Delayed Response Take 81 mg by mouth every day.     • Probiotic Product (PROBIOTIC DAILY PO) Take 1 Cap by mouth 2 Times a Day.     • magnesium oxide (MAG-OX) 400 MG Tab Take 400 mg by mouth every day.     • finasteride (PROSCAR) 5 MG Tab Take 1 Tab by mouth every day. 30 Tab 0   • alfuzosin (UROXATRAL) 10 MG SR  tablet Take 10 mg by mouth every day.     • methenamine hip (HIPPREX) 1 GM Tab Take 1 g by mouth 2 times a day.     • acetaminophen (TYLENOL) 500 MG Tab Take 1,000 mg by mouth every 6 hours as needed for Mild Pain or Moderate Pain.     • Cholecalciferol (VITAMIN D) 2000 units Cap Take 4,000 Units by mouth 2 Times a Day. Once a day          Current Outpatient Medications on File Prior to Visit   Medication Sig Dispense Refill   • fluoxetine (PROZAC) 40 MG capsule TAKE 1 CAPSULE BY MOUTH EVERY DAY 90 capsule 3   • metoprolol SR (TOPROL XL) 100 MG TABLET SR 24 HR Take 1 tablet by mouth every day. 90 tablet 3   • losartan (COZAAR) 50 MG Tab Take 1 tablet by mouth 2 times a day. 180 tablet 3   • fenofibrate (TRICOR) 145 MG Tab Take 1 tablet by mouth every day. 90 tablet 3   • clopidogrel (PLAVIX) 75 MG Tab Take 1 tablet by mouth every day. 90 tablet 3   • atorvastatin (LIPITOR) 40 MG Tab Take 1 tablet by mouth every evening. 90 tablet 3   • amLODIPine (NORVASC) 5 MG Tab Take 1 tablet by mouth every day. 90 tablet 3   • allopurinol (ZYLOPRIM) 100 MG Tab TAKE 1 TABLET BY MOUTH EVERY  tablet 3   • gabapentin (NEURONTIN) 100 MG Cap Take 1-3 Capsules by mouth at bedtime as needed (pain). 90 capsule 3   • potassium chloride (KLOR-CON) 8 MEQ tablet TAKE 1 TABLET BY MOUTH EVERY DAY 90 tablet 2   • Dulaglutide 3 MG/0.5ML Solution Pen-injector Inject 3 mg under the skin every 7 days. 0.5 mL 3   • triamcinolone acetonide (KENALOG) 0.1 % Cream      • Multiple Vitamin (MULTI VITAMIN MENS PO) Take  by mouth.     • aspirin EC (ECOTRIN) 81 MG Tablet Delayed Response Take 81 mg by mouth every day.     • Probiotic Product (PROBIOTIC DAILY PO) Take 1 Cap by mouth 2 Times a Day.     • magnesium oxide (MAG-OX) 400 MG Tab Take 400 mg by mouth every day.     • finasteride (PROSCAR) 5 MG Tab Take 1 Tab by mouth every day. 30 Tab 0   • alfuzosin (UROXATRAL) 10 MG SR tablet Take 10 mg by mouth every day.     • methenamine hip (HIPPREX) 1  GM Tab Take 1 g by mouth 2 times a day.     • acetaminophen (TYLENOL) 500 MG Tab Take 1,000 mg by mouth every 6 hours as needed for Mild Pain or Moderate Pain.     • Cholecalciferol (VITAMIN D) 2000 units Cap Take 4,000 Units by mouth 2 Times a Day. Once a day     • glucose blood strip OneTouch Ultra Blue Test Strip   U TO TEST TID     • insulin lispro (HUMALOG) 100 UNIT/ML Solution Pen-injector injection PEN INJECT 20 UNITS UNDER THE SKIN AS INSTRUCTED DAILY 96 mL 2   • Insulin Pen Needle 32 G x 4 mm (BD PEN NEEDLE SWATI U/F) USE FOR INSULIN SHOTS FIVE TIMES DAILY 500 Each 3   • Insulin Glargine (TOUJEO MAX SOLOSTAR) 300 UNIT/ML Solution Pen-injector Inject 28 Units as instructed every bedtime.       No current facility-administered medications on file prior to visit.         Allergies:   Allergies   Allergen Reactions   • Diphenhydramine Hcl Anxiety     Pt is able to tolerate  Mg benadryl with less anxiety   • Lorazepam Unspecified     Disorientation   • Ciprofloxacin      Rash,stomach ache   • Spironolactone      Acute kidney injury       Social Hx:   Social History     Socioeconomic History   • Marital status:      Spouse name: Not on file   • Number of children: Not on file   • Years of education: Not on file   • Highest education level: Not on file   Occupational History   • Not on file   Tobacco Use   • Smoking status: Never Smoker   • Smokeless tobacco: Never Used   • Tobacco comment: continued abstinence   Vaping Use   • Vaping Use: Never used   Substance and Sexual Activity   • Alcohol use: Never   • Drug use: Never   • Sexual activity: Not Currently     Partners: Female     Comment: , one daughter, 2 grands   Other Topics Concern   •  Service Yes   • Blood Transfusions No   • Caffeine Concern No   • Occupational Exposure No   • Hobby Hazards No   • Sleep Concern Yes   • Stress Concern No   • Weight Concern No   • Special Diet No   • Back Care No   • Exercise Yes   • Bike Helmet No   •  "Seat Belt Yes   • Self-Exams Yes   Social History Narrative    Av is from [place], [place] and raised in [place]. He has been in [place] since 2004. 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  He has been  to Usha since 2003, they met through a mutual friend. He has a daughter (Kalina) and a step son (Jimi).     Social Determinants of Health     Financial Resource Strain:    • Difficulty of Paying Living Expenses:    Food Insecurity:    • Worried About Running Out of Food in the Last Year:    • Ran Out of Food in the Last Year:    Transportation Needs:    • Lack of Transportation (Medical):    • Lack of Transportation (Non-Medical):    Physical Activity:    • Days of Exercise per Week:    • Minutes of Exercise per Session:    Stress:    • Feeling of Stress :    Social Connections:    • Frequency of Communication with Friends and Family:    • Frequency of Social Gatherings with Friends and Family:    • Attends [employer] Services:    • Active Member of Clubs or Organizations:    • Attends Club or Organization Meetings:    • Marital Status:    Intimate Partner Violence:    • Fear of Current or Ex-Partner:    • Emotionally Abused:    • Physically Abused:    • Sexually Abused:          EXAMINATION     Physical Exam:   Vitals: /80 (BP Location: Right arm, Patient Position: Sitting, BP Cuff Size: Adult)   Pulse 68   Temp 36.2 °C (97.2 °F) (Temporal)   Ht 1.765 m (5' 9.5\")   Wt 93 kg (205 lb)   SpO2 97%     Constitutional:   Body Habitus: Body mass index is 29.84 kg/m².  Cooperation: Fully cooperates with exam  Appearance: Well-groomed no disheveled    Respiratory-  breathing comfortable on room air, no audible wheezing  Cardiovascular- capillary refills less than 2 seconds. " No lower extremity edema is noted.   Psychiatric- alert and oriented ×3. Normal affect.    MSK and Neuro: -    Sensation is within normal limits to light touch in the right lower extremity.  Strength is 5 out of 5 throughout in the right lower extremity.    MEDICAL DECISION MAKING    DATA    Labs:   Lab Results   Component Value Date/Time    SODIUM 141 06/30/2021 01:56 PM    POTASSIUM 4.3 06/30/2021 01:56 PM    CHLORIDE 104 06/30/2021 01:56 PM    CO2 27 06/30/2021 01:56 PM    GLUCOSE 187 (H) 06/30/2021 01:56 PM    BUN 26 (H) 06/30/2021 01:56 PM    CREATININE 1.45 (H) 06/30/2021 01:56 PM    CREATININE 1.4 05/28/2008 05:42 PM    BUNCREATRAT 10 03/27/2017 02:11 PM        Lab Results   Component Value Date/Time    PROTHROMBTM 15.3 (H) 01/22/2020 12:35 PM    INR 1.19 (H) 01/22/2020 12:35 PM        Lab Results   Component Value Date/Time    WBC 8.1 02/25/2021 11:25 AM    RBC 4.73 02/25/2021 11:25 AM    HEMOGLOBIN 13.7 (L) 02/25/2021 11:25 AM    HEMATOCRIT 42.4 02/25/2021 11:25 AM    MCV 89.6 02/25/2021 11:25 AM    MCH 29.0 02/25/2021 11:25 AM    MCHC 32.3 (L) 02/25/2021 11:25 AM    MPV 10.5 02/25/2021 11:25 AM    NEUTSPOLYS 72.40 (H) 02/25/2021 11:25 AM    LYMPHOCYTES 13.40 (L) 02/25/2021 11:25 AM    MONOCYTES 7.10 02/25/2021 11:25 AM    EOSINOPHILS 5.50 02/25/2021 11:25 AM    BASOPHILS 0.70 02/25/2021 11:25 AM    HYPOCHROMIA 1+ 03/21/2012 01:08 PM        Lab Results   Component Value Date/Time    HBA1C 7.2 (H) 06/30/2021 01:56 PM          Imaging:   I personally reviewed following images    I reviewed the fluoroscopic images from 11/2/2020 showing successful right L3-4 and L4-5 transforaminal epidural steroid injection.  I reviewed these with the patient at the visit on 11/23/2020.    I reviewed the following radiology reports                                Results for orders placed during the hospital encounter of 09/24/20   MR-LUMBAR SPINE-W/O    Impression 1.  Multifocal degenerative disease in the lumbar spine as  described above.  2.  Bilateral hydronephrosis and hydroureter. This is noted on the previous CT scan dated 2020.  3.  There is small focus of sclerosis in the L4 vertebral body. This is new since the previous study. This finding likely secondary to the degeneration. However if there is a history of carcinoma the possibility of sclerotic metastasis cannot be   excluded.                                                                  Results for orders placed in visit on 20   DX-CHEST-2 VIEWS    Impression No acute cardiopulmonary disease.                                                                                   DIAGNOSIS   Visit Diagnoses     ICD-10-CM   1. Lumbar radiculopathy  M54.16   2. Spastic hemiplegia affecting left nondominant side, unspecified etiology (HCC)  G81.14   3. Other muscle spasm  M62.838   4. Hemiplegia and hemiparesis following unspecified cerebrovascular disease affecting left non-dominant side (HCC)  I69.954   5. Spasticity  R25.2         ASSESSMENT and PLAN:     Av Allen Lisbeth  1945 male      Av was seen today for follow-up.    Diagnoses and all orders for this visit:    Lumbar radiculopathy  -     REFERRAL TO PHYSICAL MEDICINE REHAB    Spastic hemiplegia affecting left nondominant side, unspecified etiology (HCC)    Other muscle spasm    Hemiplegia and hemiparesis following unspecified cerebrovascular disease affecting left non-dominant side (HCC)    Spasticity        I reviewed notes from Dr. Lorie Huff where the patient was seen for spasticity after stroke with hemiplegia.  Plans for botulinum toxin injections.  I also reviewed notes from the patient's PCP Dr. Madison Bunch including from 2021 where the patient was seen for multiple medical conditions including type 2 diabetes with an A1c of 7.2 and no longer having hypoglycemic events.  Dysphagia referred to GI consultants.  Toenail avulsion.    The patient is had a recurrence of his  lumbar radiculopathy and previously did well after epidural steroid injection.    I have ordered a L5-S1 interlaminar epidural steroid injection    The risks benefits and alternatives to this procedure were discussed and the patient wishes to proceed with the procedure. Risks include but are not limited to damage to surrounding structures, infection, bleeding, worsening of pain which can be permanent, weakness which can be permanent. Benefits include pain relief, improved function. Alternatives includes not doing the procedure.      From medication standpoint he is getting some improvement from gabapentin which she is using typically 100-200 mg nightly as needed.  When he uses 300 mg he does get significant fatigue which can go into the following day.  We discussed other medications for neuropathic pain but the patient is hesitant to start anything that can cause sedation side effects.  I discussed the case with Dr. Lorie Huff.     We discussed a spine surgery referral but the patient declined.    Follow up: After the above procedure    Thank you for allowing me to participate in the care of this patient. If you have any questions please not hesitate to contact me.             Please note that this dictation was created using voice recognition software. I have made every reasonable attempt to correct obvious errors but there may be errors of grammar and content that I may have overlooked prior to finalization of this note.      Harpal Bennett MD  Interventional Spine and Sports Physiatry  Physical Medicine and Rehabilitation  [facility]

## 2021-07-21 ENCOUNTER — ANESTHESIA (OUTPATIENT)
Dept: SURGERY | Facility: MEDICAL CENTER | Age: 76
End: 2021-07-21
Payer: MEDICARE

## 2021-07-21 ENCOUNTER — HOSPITAL ENCOUNTER (OUTPATIENT)
Facility: MEDICAL CENTER | Age: 76
End: 2021-07-21
Attending: INTERNAL MEDICINE | Admitting: INTERNAL MEDICINE
Payer: MEDICARE

## 2021-07-21 ENCOUNTER — ANESTHESIA EVENT (OUTPATIENT)
Dept: SURGERY | Facility: MEDICAL CENTER | Age: 76
End: 2021-07-21
Payer: MEDICARE

## 2021-07-21 VITALS
HEART RATE: 68 BPM | BODY MASS INDEX: 29.81 KG/M2 | TEMPERATURE: 97.2 F | SYSTOLIC BLOOD PRESSURE: 148 MMHG | WEIGHT: 204.81 LBS | OXYGEN SATURATION: 94 % | RESPIRATION RATE: 20 BRPM | DIASTOLIC BLOOD PRESSURE: 89 MMHG

## 2021-07-21 LAB
GLUCOSE BLD-MCNC: 126 MG/DL (ref 65–99)
PATHOLOGY CONSULT NOTE: NORMAL

## 2021-07-21 PROCEDURE — 700101 HCHG RX REV CODE 250: Performed by: INTERNAL MEDICINE

## 2021-07-21 PROCEDURE — A9270 NON-COVERED ITEM OR SERVICE: HCPCS | Performed by: INTERNAL MEDICINE

## 2021-07-21 PROCEDURE — 88312 SPECIAL STAINS GROUP 1: CPT

## 2021-07-21 PROCEDURE — 160025 RECOVERY II MINUTES (STATS): Performed by: INTERNAL MEDICINE

## 2021-07-21 PROCEDURE — 700105 HCHG RX REV CODE 258: Performed by: INTERNAL MEDICINE

## 2021-07-21 PROCEDURE — 88305 TISSUE EXAM BY PATHOLOGIST: CPT

## 2021-07-21 PROCEDURE — 160048 HCHG OR STATISTICAL LEVEL 1-5: Performed by: INTERNAL MEDICINE

## 2021-07-21 PROCEDURE — 160046 HCHG PACU - 1ST 60 MINS PHASE II: Performed by: INTERNAL MEDICINE

## 2021-07-21 PROCEDURE — 700111 HCHG RX REV CODE 636 W/ 250 OVERRIDE (IP): Performed by: ANESTHESIOLOGY

## 2021-07-21 PROCEDURE — 82962 GLUCOSE BLOOD TEST: CPT

## 2021-07-21 PROCEDURE — 160002 HCHG RECOVERY MINUTES (STAT): Performed by: INTERNAL MEDICINE

## 2021-07-21 PROCEDURE — 160203 HCHG ENDO MINUTES - 1ST 30 MINS LEVEL 4: Performed by: INTERNAL MEDICINE

## 2021-07-21 PROCEDURE — 160009 HCHG ANES TIME/MIN: Performed by: INTERNAL MEDICINE

## 2021-07-21 PROCEDURE — 160035 HCHG PACU - 1ST 60 MINS PHASE I: Performed by: INTERNAL MEDICINE

## 2021-07-21 RX ORDER — OXYCODONE HCL 5 MG/5 ML
5 SOLUTION, ORAL ORAL
Status: DISCONTINUED | OUTPATIENT
Start: 2021-07-21 | End: 2021-07-21 | Stop reason: HOSPADM

## 2021-07-21 RX ORDER — OXYCODONE HCL 5 MG/5 ML
10 SOLUTION, ORAL ORAL
Status: DISCONTINUED | OUTPATIENT
Start: 2021-07-21 | End: 2021-07-21 | Stop reason: HOSPADM

## 2021-07-21 RX ORDER — MEPERIDINE HYDROCHLORIDE 25 MG/ML
6.25 INJECTION INTRAMUSCULAR; INTRAVENOUS; SUBCUTANEOUS
Status: DISCONTINUED | OUTPATIENT
Start: 2021-07-21 | End: 2021-07-21 | Stop reason: HOSPADM

## 2021-07-21 RX ORDER — SODIUM CHLORIDE, SODIUM LACTATE, POTASSIUM CHLORIDE, CALCIUM CHLORIDE 600; 310; 30; 20 MG/100ML; MG/100ML; MG/100ML; MG/100ML
INJECTION, SOLUTION INTRAVENOUS CONTINUOUS
Status: DISCONTINUED | OUTPATIENT
Start: 2021-07-21 | End: 2021-07-21 | Stop reason: HOSPADM

## 2021-07-21 RX ORDER — HALOPERIDOL 5 MG/ML
1 INJECTION INTRAMUSCULAR
Status: DISCONTINUED | OUTPATIENT
Start: 2021-07-21 | End: 2021-07-21 | Stop reason: HOSPADM

## 2021-07-21 RX ORDER — DIPHENHYDRAMINE HYDROCHLORIDE 50 MG/ML
12.5 INJECTION INTRAMUSCULAR; INTRAVENOUS
Status: DISCONTINUED | OUTPATIENT
Start: 2021-07-21 | End: 2021-07-21 | Stop reason: HOSPADM

## 2021-07-21 RX ORDER — ONDANSETRON 2 MG/ML
4 INJECTION INTRAMUSCULAR; INTRAVENOUS
Status: DISCONTINUED | OUTPATIENT
Start: 2021-07-21 | End: 2021-07-21 | Stop reason: HOSPADM

## 2021-07-21 RX ORDER — LORAZEPAM 2 MG/ML
0.5 INJECTION INTRAMUSCULAR
Status: DISCONTINUED | OUTPATIENT
Start: 2021-07-21 | End: 2021-07-21 | Stop reason: HOSPADM

## 2021-07-21 RX ORDER — SODIUM CHLORIDE, SODIUM LACTATE, POTASSIUM CHLORIDE, CALCIUM CHLORIDE 600; 310; 30; 20 MG/100ML; MG/100ML; MG/100ML; MG/100ML
INJECTION, SOLUTION INTRAVENOUS CONTINUOUS
Status: DISCONTINUED | OUTPATIENT
Start: 2021-07-21 | End: 2021-07-21

## 2021-07-21 RX ADMIN — FENTANYL CITRATE 50 MCG: 50 INJECTION, SOLUTION INTRAMUSCULAR; INTRAVENOUS at 08:51

## 2021-07-21 RX ADMIN — SODIUM CHLORIDE, POTASSIUM CHLORIDE, SODIUM LACTATE AND CALCIUM CHLORIDE: 600; 310; 30; 20 INJECTION, SOLUTION INTRAVENOUS at 08:05

## 2021-07-21 RX ADMIN — POVIDONE IODINE 15 ML: 100 SOLUTION TOPICAL at 08:00

## 2021-07-21 ASSESSMENT — FIBROSIS 4 INDEX: FIB4 SCORE: 1.178511301977579207

## 2021-07-21 ASSESSMENT — PAIN SCALES - GENERAL: PAIN_LEVEL: 0

## 2021-07-21 NOTE — OR NURSING
0910-Pt arrived from OR. Report received. Pt attached to monitoring. VSS. Pt oxygenating well on 4 L mask.    0925: Patient is awake. Denies pain or nausea at this time. 4L blow by. Will wean oxygen off as tolerated.     0933: Discussed with wife DC instructions. Questions answered.     0940: Dr Huerta at bedside for update. Pictures given to patient.     0950: Patient tolerated PO intake well. Patient has L sided weakness (present at admit).     1000: DC instructions given. Questions answered. No c/o pain or nausea. Patient wide awake. VSS. Discussed diet, activity, medications, follow up care and worsening symptoms. Patient met criteria for discharge.

## 2021-07-21 NOTE — DISCHARGE INSTRUCTIONS
ENDOSCOPY HOME CARE INSTRUCTIONS    GASTROSCOPY     1. Don't eat or drink anything for about an hour after the test. You can then resume your regular diet.  2. Don't drive or drink alcohol for 24 hours. The medication you received will make you too drowsy.  3. Don't take any coffee, tea, or aspirin products until after you see your doctor. These can harm the lining of your stomach.  4. If you begin to vomit bloody material, or develop black or bloody stools, call your doctor as soon as possible.  5. If you have any neck, chest, abdominal pain or temp of 100 degrees, call your doctor.  6. See your doctor , Call for follow up appointment          You should call 911 if you develop problems with breathing or chest pain.  If any questions arise, call your doctor. If your doctor is not available, please feel free to call [phone]. You can also call the HEALTH HOTLINE open 24 hours/day, 7 days/week and speak to a nurse at [phone], or toll free [phone].    Depression / Suicide Risk    As you are discharged from this [facility] facility, it is important to learn how to keep safe from harming yourself.    Recognize the warning signs:  · Abrupt changes in personality, positive or negative- including increase in energy   · Giving away possessions  · Change in eating patterns- significant weight changes-  positive or negative  · Change in sleeping patterns- unable to sleep or sleeping all the time   · Unwillingness or inability to communicate  · Depression  · Unusual sadness, discouragement and loneliness  · Talk of wanting to die  · Neglect of personal appearance   · Rebelliousness- reckless behavior  · Withdrawal from people/activities they love  · Confusion- inability to concentrate     If you or a loved one observes any of these behaviors or has concerns about self-harm, here's what you can do:  · Talk about it- your feelings and reasons for harming yourself  · Remove any means that you might use to  hurt yourself (examples: pills, rope, extension cords, firearm)  · Get professional help from the community (Mental Health, Substance Abuse, psychological counseling)  · Do not be alone:Call your Safe Contact- someone whom you trust who will be there for you.  · Call your local CRISIS HOTLINE 971-9740 or 482-391-4775  · Call your local Children's Mobile Crisis Response Team [place] (949) 301-3880 or www.[employer]  · Call the toll free National Suicide Prevention Hotlines   · National Suicide Prevention Lifeline 819-733-UNAC (0898)  · National Hope Line Network 800-SUICIDE (168-4564)    I acknowledge receipt and understanding of these Home Care Instructions.

## 2021-07-21 NOTE — OR SURGEON
Immediate Post OP Note    PreOp Diagnosis: Dysphagia      PostOp Diagnosis: erosive esophagitis, mild distal esophageal stricturing, gastritis      Procedure(s):  GASTROSCOPY - AND DILATION - Wound Class: Clean Contaminated  GASTROSCOPY, WITH BIOPSY - Wound Class: Clean Contaminated    Surgeon(s):  Neville Huerta M.D.    Anesthesiologist/Type of Anesthesia:  Anesthesiologist: Jign Sarah M.D./General    Surgical Staff:  Endoscopy Technician: Liliana Arredondo; Tenisha Youngblood  Endoscopy Nurse: Huey Damon RSANKET; Kamran Lambert RSANKET    Specimens removed if any:  ID Type Source Tests Collected by Time Destination   A : r/o H Pyloria Tissue Gastric PATHOLOGY SPECIMEN Neville Huerta M.D. 7/21/2021  8:58 AM    B : Esophogeal Stricture  Tissue Esophagus PATHOLOGY SPECIMEN Neville Huerta M.D. 7/21/2021  8:59 AM        Estimated Blood Loss: Minimal    Findings: 1) LA Class C erosive esophagitis  2) Mild distal esophageal strituring  3) Gastritis    Complications: none        7/21/2021 9:21 AM Neville Huerta M.D.

## 2021-07-21 NOTE — ANESTHESIA PREPROCEDURE EVALUATION
75yo male with difficulty swallowing  H/O stroke, MI (stents) HTN, afib, diabetes    Relevant Problems   ANESTHESIA   (positive) ASHLEY (obstructive sleep apnea)      NEURO   (positive) H/O Cerebrovascular accident (CVA) in adulthood   (positive) H/O non-Hodgkin's lymphoma   (positive) History of renal calculi      CARDIAC   (positive) (HCC) Hypertension associated with diabetes   (positive) (HCC) Tibial artery disease   (positive) Coronary artery disease involving native coronary artery   (positive) Essential hypertension, benign   (positive) Paroxysmal atrial fibrillation (HCC)   (positive) Presence of drug coated stents in left circumflex coronary artery         (positive) (HCC) Diabetic nephropathy associated with type 2 diabetes mellitus   (positive) Hydronephrosis   (positive) Renal cyst   (positive) Stage 3 chronic kidney disease (HCC)      ENDO   (positive) Type 2 diabetes mellitus, with long-term current use of insulin (HCC)      Other   (positive) Idiopathic chronic gout of multiple sites without tophus       Physical Exam    Airway   Mallampati: III  TM distance: >3 FB  Neck ROM: full       Cardiovascular - normal exam  Rhythm: regular  Rate: normal  (-) murmur     Dental - normal exam           Pulmonary - normal exam  Breath sounds clear to auscultation     Abdominal    Neurological - normal exam                 Anesthesia Plan    ASA 3   ASA physical status 3 criteria: CVA or TIA - history (> 3 months) and CAD/stents (> 3 months)    Plan - MAC               Induction: intravenous    Postoperative Plan: Postoperative administration of opioids is intended.    Pertinent diagnostic labs and testing reviewed    Informed Consent:    Anesthetic plan and risks discussed with patient.    Use of blood products discussed with: patient whom consented to blood products.

## 2021-07-21 NOTE — ANESTHESIA POSTPROCEDURE EVALUATION
Patient: Av Bob    Procedure Summary     Date: 07/21/21 Room / Location: [facility] ROOM 26 / SURGERY SAME DAY [facility]    Anesthesia Start: 0848 Anesthesia Stop: 0914    Procedures:       GASTROSCOPY - AND DILATION (N/A Esophagus)      GASTROSCOPY, WITH BIOPSY (N/A Esophagus) Diagnosis: (Esophagitis, Esophageal stricture)    Surgeons: Neville Huerta M.D. Responsible Provider: Jing Sarah M.D.    Anesthesia Type: MAC ASA Status: 3          Final Anesthesia Type: MAC  Last vitals  BP   Blood Pressure : 139/87    Temp   36.3 °C (97.3 °F)    Pulse   65   Resp   18    SpO2   99 %      Anesthesia Post Evaluation    Patient location during evaluation: PACU  Patient participation: complete - patient participated  Level of consciousness: awake and alert  Pain score: 0    Airway patency: patent  Anesthetic complications: no  Cardiovascular status: hemodynamically stable  Respiratory status: acceptable  Hydration status: euvolemic    PONV: none          No complications documented.     Nurse Pain Score: 0 (NPRS)

## 2021-07-21 NOTE — ANESTHESIA TIME REPORT
Anesthesia Start and Stop Event Times     Date Time Event    7/21/2021 0838 Ready for Procedure     0848 Anesthesia Start     0914 Anesthesia Stop        Responsible Staff  07/21/21    Name Role Begin End    Jing Sarah M.D. Anesth 0848 0914        Preop Diagnosis (Free Text):  Pre-op Diagnosis     OROPHARYNGEAL DYSPHAGIA        Preop Diagnosis (Codes):    Post op Diagnosis  GERD (gastroesophageal reflux disease)      Premium Reason  Non-Premium    Comments:

## 2021-07-21 NOTE — PROCEDURES
DATE OF PROCEDURE:  07/21/2021     PROCEDURES:  EGD with biopsy and esophageal dilation.     ENDOSCOPIST:  Neville Huerta MD     INDICATIONS:  Dysphagia.     ANESTHESIA/MEDICATIONS:  The patient received general anesthesia.     ANESTHESIOLOGIST:  Jing Sarah MD.  Please see anesthesia flow sheets for   details.     DESCRIPTION OF PROCEDURE:  The patient was informed in detail of the   indications as well as the risks, the benefits, the alternatives of the   procedure and he indicates understanding of all this and agrees to proceed.    Consent is signed and placed on the chart.  After the patient was deemed   adequately sedated, a standard Olympus flexible gastroscope was lubricated and   gently placed through a bite block at the top of his tongue down into his   esophagus.  From here, the scope was carefully maneuvered through the   esophagus into the stomach and any residual fluid was thoroughly suctioned.    The scope was then traversed through the body of stomach, through the pylorus   into the duodenum and the farthest reach of the endoscope was second portion   of the duodenum.  Air was insufflated.  Photographs obtained.  Scope was then   slowly and carefully withdrawn looking mucosa in a circumferential methodic   manner.  No abnormalities were noted in the duodenum.  The scope was then   withdrawn back into the stomach, whereby careful inspection revealed evidence   of mild-to-moderate gastritis distributed throughout the body and antrum and   biopsies were obtained to rule out H. pylori.  Retroflexion was performed.    The scope was then withdrawn back into the esophagus, whereby it was noted   that he had some mild stricturing in the distal esophagus measuring   approximately 16 mm in diameter.  In addition, there was evidence of LA class   C erosive esophagitis and biopsies were obtained.  We then introduced a Savary   wire and a single pass with a 51-Hebrew Savary dilator was performed.  The    scope was then withdrawn and the procedure terminated.     FINDINGS:    1.  Mild distal esophageal stricturing as described, now status post dilation.  2.  Erosive esophagitis.  3.  Gastritis.     COMPLICATIONS:  None.     TISSUE AND BIOPSIES:  1.  Gastric biopsies.  2.  Esophageal biopsies.     THERAPY:  Savary esophageal dilation performed.     IMPRESSION AND PLAN AND MEDICAL DECISION MAKIN.  Dysphagia, esophageal phase.  At this point, generally have to treat this   with PPI and see if he gets any better on the dilation, although again not an   overly obstructive-appearing stricture here.  He certainly may improve over   time with PPI suppression alone.  If; however, he does not, then would not be   unreasonable to consider more oropharyngeal phase causes as was originally   suspected.  I will discuss with the patient and his wife.  2.  Erosive esophagitis.  3.  Esophageal stricture.        ______________________________  ALEJA ESTRELLA MD    EMO/SUB    DD:  2021 09:27  DT:  2021 09:52    Job#:  [id number]

## 2021-07-26 ENCOUNTER — PATIENT MESSAGE (OUTPATIENT)
Dept: CARDIOLOGY | Facility: MEDICAL CENTER | Age: 76
End: 2021-07-26

## 2021-07-27 ENCOUNTER — APPOINTMENT (OUTPATIENT)
Dept: RADIOLOGY | Facility: REHABILITATION | Age: 76
End: 2021-07-27
Attending: PHYSICAL MEDICINE & REHABILITATION
Payer: MEDICARE

## 2021-07-27 ENCOUNTER — HOSPITAL ENCOUNTER (OUTPATIENT)
Facility: REHABILITATION | Age: 76
End: 2021-07-27
Attending: PHYSICAL MEDICINE & REHABILITATION | Admitting: PHYSICAL MEDICINE & REHABILITATION
Payer: MEDICARE

## 2021-07-27 VITALS
DIASTOLIC BLOOD PRESSURE: 77 MMHG | BODY MASS INDEX: 28.72 KG/M2 | SYSTOLIC BLOOD PRESSURE: 129 MMHG | WEIGHT: 200.62 LBS | HEIGHT: 70 IN | RESPIRATION RATE: 16 BRPM | HEART RATE: 62 BPM | OXYGEN SATURATION: 93 % | TEMPERATURE: 97.9 F

## 2021-07-27 PROCEDURE — 62323 NJX INTERLAMINAR LMBR/SAC: CPT

## 2021-07-27 PROCEDURE — 700117 HCHG RX CONTRAST REV CODE 255

## 2021-07-27 PROCEDURE — 700111 HCHG RX REV CODE 636 W/ 250 OVERRIDE (IP)

## 2021-07-27 PROCEDURE — A9585 GADOBUTROL INJECTION: HCPCS

## 2021-07-27 RX ORDER — LIDOCAINE HYDROCHLORIDE 10 MG/ML
INJECTION, SOLUTION EPIDURAL; INFILTRATION; INTRACAUDAL; PERINEURAL
Status: COMPLETED
Start: 2021-07-27 | End: 2021-07-27

## 2021-07-27 RX ORDER — DEXAMETHASONE SODIUM PHOSPHATE 10 MG/ML
INJECTION, SOLUTION INTRAMUSCULAR; INTRAVENOUS
Status: COMPLETED
Start: 2021-07-27 | End: 2021-07-27

## 2021-07-27 RX ORDER — GADOBUTROL 604.72 MG/ML
INJECTION INTRAVENOUS
Status: COMPLETED
Start: 2021-07-27 | End: 2021-07-27

## 2021-07-27 RX ADMIN — LIDOCAINE HYDROCHLORIDE 10 ML: 10 INJECTION, SOLUTION EPIDURAL; INFILTRATION; INTRACAUDAL; PERINEURAL at 09:33

## 2021-07-27 RX ADMIN — DEXAMETHASONE SODIUM PHOSPHATE 10 MG: 10 INJECTION, SOLUTION INTRAMUSCULAR; INTRAVENOUS at 09:36

## 2021-07-27 RX ADMIN — GADOBUTROL 5 ML: 604.72 INJECTION INTRAVENOUS at 09:35

## 2021-07-27 ASSESSMENT — FIBROSIS 4 INDEX: FIB4 SCORE: 1.178511301977579207

## 2021-07-27 NOTE — PROGRESS NOTES
0926 AM.    Onto procedure table  on prone positioned and necessary monitoring equipment connected (   pulse oximeter, BP ) with the help by  team  ( CST, X-ray TECH and RN ). Hands supported with stool underneath headrest of the bed , lower extremities supported with pillow.    0932 AM.   Identified patient, medication allergies, site and procedure confirmed and naomy by Dr. Bennett . Reviewed medical health history  ( stage 3 CKD, CVA, left hemiparesis, type 2 DM, chronic Gout, ASHLEY on BiPAP, cardiac stent, HTN ).  Blood sugar today was 97 mg. / dl . Plavix ans Aspirin 81 mg off for 1/12 weeks.Timed out agreed by team and patient..

## 2021-07-27 NOTE — PROGRESS NOTES
0909 Pt arrived to pre-procedure area. Procedure & plan for recovery reviewed with pt, site confirmed, & consent signed. VSS. Allergies & current medications verified. Appointed  waiting in car. Printed discharge instructions discussed & signed, all questions answered at this time. Pertinent medical Hx includes ASHLEY, pt reports no bi-pap at [facility] r/t intolerance. FSBG checked prior to arrival @ 0800, resulted @ 97. Hx and fall risk communicated to procedure RN. Pt denies taking any NSAIDS or anticoagulants in the last 5 days. MD to bedside prior to procedure.     0942 Pt to recovery area s/p epidural steroid injection & updates received from procedure RN. VSS. Dressing CDI, no swelling noted. Pt tolerating PO fluids & snacks with no C/O N/V. Dr. Bennett to bedside to evaluate pt post-procedure.     0950 Pt meets DC criteria. All belongings with pt at time of DC. RN assisted pt off unit to appointed  via .

## 2021-07-27 NOTE — INTERVAL H&P NOTE
H&P reviewed. The patient was examined and there are no changes to the H&P     Lungs clear to auscultation bilaterally.  No abdominal tenderness.  Heart regular rate and rhythm.  Vitals reviewed.    Proceed with the originally scheduled procedure.  The risks, benefits and alternatives were discussed.  The patient understands these.    Pre-Op Diagnosis Codes:     * Lumbar radiculopathy [M54.16]  Procedure(s):  Lumbar L5-S1 interlaminar epidural steroid injection    Harpal Bennett MD  Physical Medicine and Rehabilitation  Interventional Spine and Sports Physiatry  [facility]

## 2021-07-27 NOTE — OP REPORT
Date of Service: 7/27/2021    Physician/s: Harpal Bennett MD    Pre-operative Diagnosis: Lumbar radiculopathy     Post-operative Diagnosis: Lumbar radiculopathy,     Procedure: Lumbar L5-S1 interlaminar epidural steroid injection     Description of procedure:    The risks, benefits, and alternatives of the procedure were reviewed and discussed with the patient.  Written informed consent was freely obtained. A pre-procedural time-out was conducted by the physician verifying patient’s identity, procedure to be performed, procedure site and side, and allergy verification. Appropriate equipment was determined to be in place for the procedure.         In the fluoroscopy suite the patient was placed in a prone position, a pillow placed underneath their umbilicus. The skin was prepped and draped in the usual sterile fashion. The fluoroscope was placed over the lumbar spine at the appropriate injection AP angle view, and the target for injection was marked. A 27g needle was placed into the marked site, and 2mL of 1% Lidocaine was injected subcutaneously into the epidermal and dermal layers. The needle was removed. A 20g Tuohy needle was then placed and advanced under fluoroscopic guidance into the RIGHT L5-S1  interlaminar space utilizing the initial position AP view and a lateral view to ensure proper location of the needle tip at all times. A loss of resistance technique was used to guide the needle into the epidural space in a contralateral oblique fluoroscopic view. In the AP and lateral views, contrast dye was used to highlight the epidural space spread while the fluoroscope was running live. Following negative aspiration, 2cc of 1% lidocaine preservative free with 1mL of 10mg/ml dexamethasone was then injected, and the needle was removed intact after restyleted. The patient's back was covered with a 4x4 gauze, the area was cleansed with sterile normal saline, and a dressing was applied. There were no complications  Mitral Stenosis   WHAT YOU NEED TO KNOW:   What is mitral stenosis? Mitral stenosis is a condition where the mitral valve in your heart is narrow  The mitral valve is between the left ventricle and left atrium of your heart  The lower left ventricle pumps blood into your upper left atrium  The valve opens and closes to direct blood flow through your heart  When the mitral valve is narrowed, blood flow through your heart may decrease  Your tissues and other organs will not have enough oxygen and nutrients to function properly  What causes mitral stenosis? · Rheumatic fever: This is fever and inflammation of your joints  It can develop after you have a strep throat infection  Rheumatic fever can cause inflammation and damage to your mitral valve  The walls of your mitral valve may narrow, and may even join together  · Congenital heart defect:  Some people are born with a damaged mitral valve that leads to narrowing and blockage  · Calcium buildup:  As you age, calcium can build up on the mitral valve walls  The calcium stiffens and thickens the valve  · Medical conditions:  Tumors, an infection, or an autoimmune disease may cause mitral stenosis  An autoimmune disease is when your body attacks itself instead of an illness or disease  What are the signs and symptoms of mitral stenosis? · Severe tiredness    · Shortness of breath during activity or when you lie down    · Swollen feet or ankles    · Fast, jumpy, or fluttery heartbeat    · Coughing up bloody mucus    · Trouble swallowing and a hoarse voice  How is mitral stenosis diagnosed? Your healthcare provider will ask about your signs and symptoms and listen to your heart  He will ask if you have ever had strep throat or rheumatic fever  You may need any of the following tests:  · EKG: This test records the electrical activity of your heart  It is used to check for an abnormal heart rhythm caused by mitral stenosis  · Chest x-ray:   This is used to check the size of your heart and to look for fluid around your heart and lungs  · An echocardiogram  is a type of ultrasound  Sound waves are used to show the structure and function of your heart  · Transesophageal echocardiogram:  This test is also called a LOUIE  It may be done if your heart cannot be seen well during a regular echo  A LOUIE will show blood clots or a heart infection  You will be given medicine to relax you during a LOUIE  Healthcare providers will put a tube in your mouth that is moved down into your esophagus  The tube has a small ultrasound sensor on the end  Since your esophagus is right next to your heart, your healthcare provider can see your heart clearly  · Cardiac catheterization: This procedure is done to find and treat heart blockages  A thin, bendable tube is inserted into your arm, neck, or groin and moved into your heart  An x-ray may be used to guide the tube to the right place  Dye may be put into your vein so the pictures show up better on a monitor  Tell the healthcare provider if you have ever had an allergic reaction to contrast dye  How is mitral stenosis treated? · Medicines: You may need the following medicines to improve your symptoms or prevent problems caused by mitral stenosis:    ¨ Antibiotics: This medicine will help fight or prevent an infection  You may need it if you had rheumatic fever in the past so you do not get it again  You may need to take the medicine every day, or once a month  ¨ Heart medicine: This medicine is given to slow your heart rate and help your heart fill with blood  This will help your heart pump blood to your body more efficiently  ¨ Diuretics: This medicine is given to remove extra fluid that has collected in your heart, lungs, or legs  They are often called water pills  You may urinate more often when you take this medicine  ¨ Blood thinners: This medicine helps prevent clots from forming in the blood   Clots noted.     The patient was then evaluated post-procedure, and was hemodynamically stable prior to leaving the post-operative care unit.     Follow-up as scheduled    Harpal Bennett MD      CPT  Interlaminar epidural injection - lumbar or sacral (caudal): 93937         can cause strokes, heart attacks, and death  Blood thinners make it more likely for you to bleed or bruise  Use an electric razor and soft toothbrush to help prevent bleeding  · Procedures:      ¨ Valvotomy: This helps widen your mitral valve and allow blood to flow through easier  A catheter (long thin tube) with a balloon on the tip is inserted through a small skin incision in your arm or groin  The catheter is guided through a blood vessel, and into your left atrium near your mitral valve  When the balloon is inflated, it stretches the valve opening  ¨ Valvuloplasty:  Healthcare providers make an incision in your chest to repair and widen your mitral valve  The valve walls are  or calcium buildup is removed  This helps improve the blood flow through your heart  ¨ Replacement:  Healthcare providers make an incision in your chest to replace your damaged mitral valve  Part or all of your mitral valve is removed and a new valve is secured in place  You may get a new valve from a donor (another person or animal), or you may get an artificial valve  When you have an artificial valve, you may need to take antibiotic medicine when you have medical procedures  This includes before and after dental work or surgery  The antibiotic medicine will help prevent germs from causing an infection in your heart  What are the risks of mitral stenosis? · Your mitral valve may narrow again, even after treatment  Tissues, other heart valves, and nearby areas may get damaged during surgery  You may bleed more than expected  After surgery, you may get an infection  The sac that surrounds your heart and its large blood vessels may swell  The swelling may cause fluid to collect around your heart, making it hard for your heart to beat  The valves may stop working well and blood may flow back into the upper chamber of your heart  · You may get a blood clot in your leg or arm   This can cause pain and swelling, and it can stop blood from flowing where it needs to go in your body  The blood clot may break loose and travel to your lungs or brain  A blood clot in your lungs can cause chest pain and trouble breathing  A blood clot in your brain can cause a stroke  These problems can be life-threatening  · Without treatment, your symptoms, such as shortness of breath and fatigue may get worse  Blood and fluid may build up in your lungs, and your heart or lungs may begin to fail  You may get an infection in your heart  Mitral stenosis may cause abnormal heartbeats, which increase the risk of blood clots forming in your heart  You may have a stroke  If you are pregnant, mitral stenosis may cause health problems for you and your unborn baby  Your symptoms may get worse  These problems can be life-threatening  How can I manage my symptoms? · Eat a variety of healthy foods:  Healthy foods include fruits, vegetables, whole-grain breads, low-fat dairy products, beans, lean meats, and fish  Decrease the amount of salt and caffeine you eat or drink  Ask if you need to be on a special diet  · Exercise: This will improve your heart health  Ask your healthcare provider about the best exercise plan for you  Start slowly and increase activity as you get stronger  Stop if you feel short of breath  · Maintain a healthy weight:  Ask your healthcare provider how much you should weigh  Ask him to help you create a weight loss plan if you are overweight  When should I contact my healthcare provider? · You are bleeding from your nose or gums  · The veins in your neck look swollen or are bulging  · You have a fever  · You have blood in your urine or bowel movements  · You have questions or concerns about your condition or care  When should I seek immediate care or call 911? · Your arm or leg feels warm, tender, and painful  It may look swollen and red      · Your heart is beating faster than normal for you, and you feel fluttering in your chest     · You suddenly feel lightheaded and short of breath  · You have chest pain that feels like squeezing, pressure, or fullness  · You have chest pain that lasts for more than a few minutes or returns  · You are nauseated and have trouble breathing  · You have a severe headache, a cold sweat, and feel lightheaded or dizzy  · You have weakness or numbness in one arm or leg, or on one side of your face  · You are confused and cannot speak clearly  CARE AGREEMENT:   You have the right to help plan your care  Learn about your health condition and how it may be treated  Discuss treatment options with your caregivers to decide what care you want to receive  You always have the right to refuse treatment  The above information is an  only  It is not intended as medical advice for individual conditions or treatments  Talk to your doctor, nurse or pharmacist before following any medical regimen to see if it is safe and effective for you  © 2017 [employer] Information is for End User's use only and may not be sold, redistributed or otherwise used for commercial purposes  All illustrations and images included in CareNotes® are the copyrighted property of [employer]  or [facility]

## 2021-07-28 ENCOUNTER — TELEPHONE (OUTPATIENT)
Dept: PHYSICAL MEDICINE AND REHAB | Facility: MEDICAL CENTER | Age: 76
End: 2021-07-28

## 2021-07-28 NOTE — TELEPHONE ENCOUNTER
Spoke to Pt in regards to his SP that was done with Dr. Bennett dated 7/27/21 for his Lumbar L5-S1 interlaminar epidural steroid injection and he stated he is fine.

## 2021-08-07 DIAGNOSIS — M79.2 NEUROPATHIC PAIN: ICD-10-CM

## 2021-08-07 DIAGNOSIS — M54.16 LUMBAR RADICULOPATHY: ICD-10-CM

## 2021-08-09 RX ORDER — GABAPENTIN 100 MG/1
CAPSULE ORAL
Qty: 90 CAPSULE | Refills: 3 | Status: SHIPPED | OUTPATIENT
Start: 2021-08-09 | End: 2022-05-03 | Stop reason: SDUPTHER

## 2021-08-12 ENCOUNTER — OFFICE VISIT (OUTPATIENT)
Dept: PHYSICAL MEDICINE AND REHAB | Facility: REHABILITATION | Age: 76
End: 2021-08-12
Payer: MEDICARE

## 2021-08-12 VITALS
OXYGEN SATURATION: 98 % | HEART RATE: 70 BPM | RESPIRATION RATE: 18 BRPM | TEMPERATURE: 97.4 F | SYSTOLIC BLOOD PRESSURE: 120 MMHG | DIASTOLIC BLOOD PRESSURE: 70 MMHG

## 2021-08-12 DIAGNOSIS — I69.954 HEMIPLEGIA AND HEMIPARESIS FOLLOWING UNSPECIFIED CEREBROVASCULAR DISEASE AFFECTING LEFT NON-DOMINANT SIDE (HCC): Primary | ICD-10-CM

## 2021-08-12 DIAGNOSIS — G81.14 SPASTIC HEMIPLEGIA AFFECTING LEFT NONDOMINANT SIDE, UNSPECIFIED ETIOLOGY (HCC): ICD-10-CM

## 2021-08-12 DIAGNOSIS — M62.838 OTHER MUSCLE SPASM: ICD-10-CM

## 2021-08-12 PROCEDURE — 64644 CHEMODENERV 1 EXTREM 5/> MUS: CPT | Performed by: PHYSICAL MEDICINE & REHABILITATION

## 2021-08-12 PROCEDURE — 76942 ECHO GUIDE FOR BIOPSY: CPT | Performed by: PHYSICAL MEDICINE & REHABILITATION

## 2021-08-12 NOTE — PROCEDURES
[facility]  Physical Medicine & Rehabilitation Clinic  [address] [place] [address]  Ph: [phone]    PROCEDURE NOTE    Procedure: Botulinum Injection  Primary Diagnosis & Secondary Diagnoses:     Visit Diagnoses     ICD-10-CM   1. Hemiplegia and hemiparesis following unspecified cerebrovascular disease affecting left non-dominant side ([facility])  I69.954   2. Other muscle spasm  M62.838   3. Spastic hemiplegia affecting left nondominant side, unspecified etiology ([facility])  G81.14     Vitals:    08/12/21 1542   BP: 120/70   Pulse: 70   Resp: 18   Temp: 36.3 °C (97.4 °F)   SpO2: 98%       INFORMED CONSENT   The risks and benefits of the procedure were explained to the patient, and all questions were answered. Benefits of the injection include spasticity reduction by decrease in muscle activation following toxin injection. Adverse effects from toxin injection include but are not limited to: excessive weakness, infection, breathing and/or swallowing difficulty.  Common adverse effects from the injection itself are pain and bruising. The patient demonstrated good understanding of risks and benefits and consents to botulinum toxin injection.     Received botulinum toxin product in last 3 mos: No  Have recently received abx (aminoglycosides): No  Take anti-spasticity medications: Yes  Take allergy/cold medicine:  No  Take a sleep medicine: No  Lactating/pregnant: No  Known NMJ disease: No  Human albumin allergy: No    Pre-procedure time out was performed.     PROCEDURE:  Usual sterile procedure was observed with sterile prep with chlorhexidine. Ultrasound was used for needle guidance for the injections in the following muscles. A negative aspiration of blood was obtained, and the following was injected into each muscle.    Botox: left upper extremity  Location Botox Amount in Units   Biceps    50 units   FCR  75 units   FCU  75 units   Pronator teres   50 units   FDS   75 units   FDP   75 units   Total Units  400  units      Patient did not get as much benefit from lower extremity Botox at last visit so today we decided to increase the number of muscles targeted in the left upper extremity for maximal benefit.    Injection sites were cleaned with alcohol and band aid was applied afterwards.  The patient tolerated the procedure well.  There were no complications.  The images were uploaded for permanent storage    MEDICATION USED:  100 units of botulinum toxin type A, mixed with 2mL of sterile, preservative-free normal saline in two 1cc syringes, for a total of 50 units in 1 mL. Repeated for other vials.    Total units injected: 400 units  Total units discarded: 0 units    See MAR for Botox details.     Counseling: Pt was instructed to seek immediate medical attention if fever, difficulty breathing, difficulty swallowing, or other concerning sxs arise. RTC in 2-4 weeks to investigate for effect at peak.    Additional Plan:   -Continue aggressive stretching exercises     BOTOX (4 vials)  NDC 5749-6263-11  Lot [id number]  Exp 10/2023    Dr. Lorie Huff DO, MS  Department of Physical Medicine & Rehabilitation  Neuro Rehabilitation Clinic  [facility]

## 2021-08-18 DIAGNOSIS — E11.69 TYPE 2 DIABETES MELLITUS WITH OTHER SPECIFIED COMPLICATION, WITH LONG-TERM CURRENT USE OF INSULIN (HCC): ICD-10-CM

## 2021-08-18 DIAGNOSIS — Z79.4 TYPE 2 DIABETES MELLITUS WITH OTHER SPECIFIED COMPLICATION, WITH LONG-TERM CURRENT USE OF INSULIN (HCC): ICD-10-CM

## 2021-08-18 RX ORDER — DULAGLUTIDE 3 MG/.5ML
INJECTION, SOLUTION SUBCUTANEOUS
Qty: 2 ML | Refills: 4 | Status: SHIPPED | OUTPATIENT
Start: 2021-08-18 | End: 2022-01-03

## 2021-08-26 ENCOUNTER — OFFICE VISIT (OUTPATIENT)
Dept: PHYSICAL MEDICINE AND REHAB | Facility: MEDICAL CENTER | Age: 76
End: 2021-08-26
Payer: MEDICARE

## 2021-08-26 VITALS
HEIGHT: 70 IN | SYSTOLIC BLOOD PRESSURE: 126 MMHG | BODY MASS INDEX: 29.35 KG/M2 | OXYGEN SATURATION: 97 % | HEART RATE: 67 BPM | TEMPERATURE: 96.9 F | DIASTOLIC BLOOD PRESSURE: 84 MMHG | WEIGHT: 205 LBS

## 2021-08-26 DIAGNOSIS — M54.16 LUMBAR RADICULOPATHY: ICD-10-CM

## 2021-08-26 DIAGNOSIS — G81.94 LEFT HEMIPARESIS (HCC): ICD-10-CM

## 2021-08-26 PROCEDURE — 99214 OFFICE O/P EST MOD 30 MIN: CPT | Performed by: PHYSICAL MEDICINE & REHABILITATION

## 2021-08-26 ASSESSMENT — FIBROSIS 4 INDEX: FIB4 SCORE: 1.178511301977579207

## 2021-08-26 ASSESSMENT — PAIN SCALES - GENERAL: PAINLEVEL: NO PAIN

## 2021-08-26 ASSESSMENT — PATIENT HEALTH QUESTIONNAIRE - PHQ9: CLINICAL INTERPRETATION OF PHQ2 SCORE: 0

## 2021-08-26 NOTE — PROGRESS NOTES
Follow up patient note  Interventional spine and sports physiatry, Physical medicine rehabilitation      Chief complaint:   Chief Complaint   Patient presents with   • Follow-Up     back pain           HISTORY    Please see new patient note by Dr Bennett,  for more details.     HPI  Patient identification: Av Bob ,  1945,   With Diagnoses of Lumbar radiculopathy and Left hemiparesis ([facility]) were pertinent to this visit.     Procedures:  2020 right L3-4 and L4-5 transforaminal epidural steroid injection 95% pain relief and follow-up visit  3/29/2021 right L3-4 and L4-5 transforaminal epidural steroid injection 50% improved postprocedure.      Overall the patient's pain is dramatically improved in terms of the consistency of his pain.  Previously his pain was causing constant severe pain.  Now this is intermittent.  Currently the pain is minimal but he states approximately every other day to every 3 days he does have a flare of pain which can last for several hours.  This usually radiates down the right leg but occasionally can be on the left leg.  He is unsure of the exact location on the legs.  Overall he does note long periods of time with almost no pain and he is very happy with this.         ROS Red Flags :   Fever, Chills, Sweats: Denies  Involuntary Weight Loss: Denies  Saddle Anesthesia: Denies        PMHx:   Past Medical History:   Diagnosis Date   • ([facility]) Chronic ulcer of right lower extremity with fat layer exposed 2018   • Acute on chronic renal failure ([facility]) 2020   • Acute respiratory failure with hypoxia ([facility]) 2017   • CAD (coronary artery disease)     GIOVANNA to RCA in ', GIOVANNA X2 to LAD and GIOVANNA X2 to OM in '   • Cancer ([facility])     2017; chemo lympoma non hodgkins lymphoma   • Cataract     dez iol   • Cerebrovascular accident (CVA) ([facility]) 2016    Left arm weakness  etiology of stroke not established, lymphoma discovered on MRI evaluation of stroke, L  hemiparesis much worse after acute infectious illness in mid 2017, but no specific diagnosed recurrent neurological etiology, all at [facility]   • Chickenpox    • CKD (chronic kidney disease) stage 3, GFR 30-59 ml/min ([facility])    • Controlled gout 2014   • Coronary atherosclerosis of native coronary artery     S/P PTCA (percutaneous transluminal coronary angioplasty), RCA, 5/1997, patent on cath 7/10/2009 at the time of interventions on his left anterior descending and circumflex coronary arteries   • Daytime sleepiness    • Depression    • Diabetes ([facility])    • Difficulty swallowing    • Enterococcal septicemia ([facility]) 8/12/2017   • Roberto hematuria 1/29/2020   • Frequent urination    • GERD (gastroesophageal reflux disease)    • Tamazight measles    • Hypertension 06/30/2021    pt states well controlled on meds   • Hypokalemia 2012    controlled with combination of ACE inhibitor or ARB plus spironolactone   • Hypomagnesemia 08/12/2017    etiology uncertain   • Influenza A 1/26/2020   • Insomnia    • Kidney stone    • Leg edema, right 1/22/2020   • 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 Barry   • Normocytic hypochromic anemia 5/20/2017   • NSTEMI (non-ST elevated myocardial infarction) ([facility]) 07/18/2017    complicating UTI with sepsis   • Pain 06/30/2021    right leg foot   • Peripheral vascular disease ([facility])    • Polyneuropathy in diabetes(357.2) 9/11/2013   • Renal mass 1/21/2020   • Septic shock ([facility]) 5/20/2017   • Skin ulcer of calf ([facility]) 2015    Right, Dr. Terry and wound care   • Stroke ([facility]) 2016    left sided weakness   • Urinary bladder disorder    • Urinary incontinence     pt wears pads   • Weakness    • Wound of left leg 2012    Requiring surgery and debridment, Dr. Moore       PSHx:   Past Surgical History:   Procedure Laterality Date   • [place] INJ LUMBAR/SACRAL,W/ IMAGING  7/27/2021     Procedure: Lumbar L5-S1 interlaminar epidural steroid injection;  Surgeon: Harpal Bennett M.D.;  Location: [facility];  Service: Pain Management   • PB UPPER GI ENDOSCOPY,DIAGNOSIS N/A 7/21/2021    Procedure: GASTROSCOPY - AND DILATION;  Surgeon: Neville Huerta M.D.;  Location: SURGERY SAME DAY [facility];  Service: Gastroenterology   • PB UPPER GI ENDOSCOPY,BIOPSY N/A 7/21/2021    Procedure: GASTROSCOPY, WITH BIOPSY;  Surgeon: Neville Huerta M.D.;  Location: SURGERY SAME DAY [facility];  Service: Gastroenterology   • LUMBAR TRANSFORAMINAL EPIDURAL STEROID INJECTION Right 3/29/2021    Procedure: RIGHT L3-4 and L4-5 transforaminal epidural steroid injection with fluoroscopic guidance;  Surgeon: Harpal Bennett M.D.;  Location: [facility];  Service: Pain Management   • LUMBAR TRANSFORAMINAL EPIDURAL STEROID INJECTION Right 11/2/2020    Procedure: INJECTION, STEROID, SPINE, LUMBAR, EPIDURAL, TRANSFORAMINAL APPROACH;  Surgeon: Harpal Bennett M.D.;  Location: [facility];  Service: Pain Management   • CYSTOSCOPY STENT PLACEMENT Left 2/12/2018    Procedure: CYSTOSCOPY  ;  Surgeon: Rey Barry M.D.;  Location: [facility];  Service: Urology   • URETEROSCOPY Left 2/12/2018    Procedure: URETEROSCOPY- FLEXIBLE  ;  Surgeon: Rey Barry M.D.;  Location: [facility];  Service: Urology   • LASERTRIPSY Left 2/12/2018    Procedure: LASERTRIPSY - LITHO  ;  Surgeon: Rey Barry M.D.;  Location: [facility];  Service: Urology   • WOUND CLOSURE GENERAL  4/3/2012    Performed by GERHARD GILBERT at SURGERY SAME DAY [facility] ORS   • Z CARDIAC CATH  2009    Stents to LAD, Om   • DAGOBERTO BY LAPAROSCOPY  1998   • ANGIOPLASTY  1997    RCA followed by other stents as noted above.    • ZZZ CARDIAC CATH  1997    stent RCA   • CATARACT EXTRACTION WITH IOL      bilateral   • LITHOTRIPSY     • TONSILLECTOMY     • TONSILLECTOMY AND ADENOIDECTOMY          Family history   Family History   Problem Relation Age of Onset   • Heart Disease Father         CAD   • Diabetes Father    • Cancer Mother    • Psychiatric Illness Mother         Depression   • Depression Mother    • Kidney stones Brother    • Heart Disease Brother    • Psychiatric Illness Brother         Depression   • Depression Brother    • Suicide Attempts Other    • Psychiatric Illness Other         autism         Medications:   Outpatient Medications Marked as Taking for the 8/26/21 encounter (Office Visit) with Harpal Bennett M.D.   Medication Sig Dispense Refill   • TRULICITY 3 MG/0.5ML Solution Pen-injector INJECT 0.5 ML UNDER THE SKIN EVERY 7 DAYS 2 mL 4   • gabapentin (NEURONTIN) 100 MG Cap TAKE 1 TO 3 CAPSULES BY MOUTH AT BEDTIME AS NEEDED FOR PAIN 90 capsule 3   • fluoxetine (PROZAC) 40 MG capsule TAKE 1 CAPSULE BY MOUTH EVERY DAY 90 capsule 3   • metoprolol SR (TOPROL XL) 100 MG TABLET SR 24 HR Take 1 tablet by mouth every day. 90 tablet 3   • losartan (COZAAR) 50 MG Tab Take 1 tablet by mouth 2 times a day. 180 tablet 3   • fenofibrate (TRICOR) 145 MG Tab Take 1 tablet by mouth every day. 90 tablet 3   • clopidogrel (PLAVIX) 75 MG Tab Take 1 tablet by mouth every day. 90 tablet 3   • atorvastatin (LIPITOR) 40 MG Tab Take 1 tablet by mouth every evening. 90 tablet 3   • amLODIPine (NORVASC) 5 MG Tab Take 1 tablet by mouth every day. 90 tablet 3   • allopurinol (ZYLOPRIM) 100 MG Tab TAKE 1 TABLET BY MOUTH EVERY  tablet 3   • potassium chloride (KLOR-CON) 8 MEQ tablet TAKE 1 TABLET BY MOUTH EVERY DAY 90 tablet 2   • triamcinolone acetonide (KENALOG) 0.1 % Cream      • insulin lispro (HUMALOG) 100 UNIT/ML Solution Pen-injector injection PEN INJECT 20 UNITS UNDER THE SKIN AS INSTRUCTED DAILY 96 mL 2   • Multiple Vitamin (MULTI VITAMIN MENS PO) Take  by mouth.     • aspirin EC (ECOTRIN) 81 MG Tablet Delayed Response Take 81 mg by mouth every day.     • Probiotic Product (PROBIOTIC DAILY PO)  Take 1 Cap by mouth 2 Times a Day.     • magnesium oxide (MAG-OX) 400 MG Tab Take 400 mg by mouth every day.     • finasteride (PROSCAR) 5 MG Tab Take 1 Tab by mouth every day. 30 Tab 0   • alfuzosin (UROXATRAL) 10 MG SR tablet Take 10 mg by mouth every day.     • methenamine hip (HIPPREX) 1 GM Tab Take 1 g by mouth 2 times a day.     • Insulin Glargine (TOUJEO MAX SOLOSTAR) 300 UNIT/ML Solution Pen-injector Inject 28 Units as instructed every bedtime.     • acetaminophen (TYLENOL) 500 MG Tab Take 1,000 mg by mouth every 6 hours as needed for Mild Pain or Moderate Pain.     • Cholecalciferol (VITAMIN D) 2000 units Cap Take 4,000 Units by mouth 2 Times a Day. Once a day          Current Outpatient Medications on File Prior to Visit   Medication Sig Dispense Refill   • TRULICITY 3 MG/0.5ML Solution Pen-injector INJECT 0.5 ML UNDER THE SKIN EVERY 7 DAYS 2 mL 4   • gabapentin (NEURONTIN) 100 MG Cap TAKE 1 TO 3 CAPSULES BY MOUTH AT BEDTIME AS NEEDED FOR PAIN 90 capsule 3   • fluoxetine (PROZAC) 40 MG capsule TAKE 1 CAPSULE BY MOUTH EVERY DAY 90 capsule 3   • metoprolol SR (TOPROL XL) 100 MG TABLET SR 24 HR Take 1 tablet by mouth every day. 90 tablet 3   • losartan (COZAAR) 50 MG Tab Take 1 tablet by mouth 2 times a day. 180 tablet 3   • fenofibrate (TRICOR) 145 MG Tab Take 1 tablet by mouth every day. 90 tablet 3   • clopidogrel (PLAVIX) 75 MG Tab Take 1 tablet by mouth every day. 90 tablet 3   • atorvastatin (LIPITOR) 40 MG Tab Take 1 tablet by mouth every evening. 90 tablet 3   • amLODIPine (NORVASC) 5 MG Tab Take 1 tablet by mouth every day. 90 tablet 3   • allopurinol (ZYLOPRIM) 100 MG Tab TAKE 1 TABLET BY MOUTH EVERY  tablet 3   • potassium chloride (KLOR-CON) 8 MEQ tablet TAKE 1 TABLET BY MOUTH EVERY DAY 90 tablet 2   • triamcinolone acetonide (KENALOG) 0.1 % Cream      • insulin lispro (HUMALOG) 100 UNIT/ML Solution Pen-injector injection PEN INJECT 20 UNITS UNDER THE SKIN AS INSTRUCTED DAILY 96 mL 2   •  Multiple Vitamin (MULTI VITAMIN MENS PO) Take  by mouth.     • aspirin EC (ECOTRIN) 81 MG Tablet Delayed Response Take 81 mg by mouth every day.     • Probiotic Product (PROBIOTIC DAILY PO) Take 1 Cap by mouth 2 Times a Day.     • magnesium oxide (MAG-OX) 400 MG Tab Take 400 mg by mouth every day.     • finasteride (PROSCAR) 5 MG Tab Take 1 Tab by mouth every day. 30 Tab 0   • alfuzosin (UROXATRAL) 10 MG SR tablet Take 10 mg by mouth every day.     • methenamine hip (HIPPREX) 1 GM Tab Take 1 g by mouth 2 times a day.     • Insulin Glargine (TOUJEO MAX SOLOSTAR) 300 UNIT/ML Solution Pen-injector Inject 28 Units as instructed every bedtime.     • acetaminophen (TYLENOL) 500 MG Tab Take 1,000 mg by mouth every 6 hours as needed for Mild Pain or Moderate Pain.     • Cholecalciferol (VITAMIN D) 2000 units Cap Take 4,000 Units by mouth 2 Times a Day. Once a day     • glucose blood strip OneTouch Ultra Blue Test Strip   U TO TEST TID     • Insulin Pen Needle 32 G x 4 mm (BD PEN NEEDLE SWATI U/F) USE FOR INSULIN SHOTS FIVE TIMES DAILY 500 Each 3     No current facility-administered medications on file prior to visit.         Allergies:   Allergies   Allergen Reactions   • Diphenhydramine Hcl Anxiety     Pt is able to tolerate  Mg benadryl with less anxiety   • Lorazepam Unspecified     Disorientation   • Ciprofloxacin      Rash,stomach ache   • Spironolactone      Acute kidney injury       Social Hx:   Social History     Socioeconomic History   • Marital status:      Spouse name: Not on file   • Number of children: Not on file   • Years of education: Not on file   • Highest education level: Not on file   Occupational History   • Not on file   Tobacco Use   • Smoking status: Never Smoker   • Smokeless tobacco: Never Used   • Tobacco comment: continued abstinence   Vaping Use   • Vaping Use: Never used   Substance and Sexual Activity   • Alcohol use: Never   • Drug use: Never   • Sexual activity: Not Currently      Partners: Female     Comment: , one daughter, 2 grands   Other Topics Concern   •  Service Yes   • Blood Transfusions No   • Caffeine Concern No   • Occupational Exposure No   • Hobby Hazards No   • Sleep Concern Yes   • Stress Concern No   • Weight Concern No   • Special Diet No   • Back Care No   • Exercise Yes   • Bike Helmet No   • Seat Belt Yes   • Self-Exams Yes   Social History Narrative    Av is from [place], [place] and raised in [place]. He has been in [place] since 2004. 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  He has been  to Usha since 2003, they met through a mutual friend. He has a daughter (Kalina) and a step son (Jimi).     Social Determinants of Health     Financial Resource Strain:    • Difficulty of Paying Living Expenses:    Food Insecurity:    • Worried About Running Out of Food in the Last Year:    • Ran Out of Food in the Last Year:    Transportation Needs:    • Lack of Transportation (Medical):    • Lack of Transportation (Non-Medical):    Physical Activity:    • Days of Exercise per Week:    • Minutes of Exercise per Session:    Stress:    • Feeling of Stress :    Social Connections:    • Frequency of Communication with Friends and Family:    • Frequency of Social Gatherings with Friends and Family:    • Attends [employer] Services:    • Active Member of Clubs or Organizations:    • Attends Club or Organization Meetings:    • Marital Status:    Intimate Partner Violence:    • Fear of Current or Ex-Partner:    • Emotionally Abused:    • Physically Abused:    • Sexually Abused:          EXAMINATION     Physical Exam:   Vitals: /84 (BP Location: Right arm, Patient Position: Sitting, BP Cuff Size: Adult)   Pulse 67   Temp 36.1 °C  "(96.9 °F) (Temporal)   Ht 1.765 m (5' 9.5\")   Wt 93 kg (205 lb)   SpO2 97%     Constitutional:   Body Habitus: Body mass index is 29.84 kg/m².  Cooperation: Fully cooperates with exam  Appearance: Well-groomed no disheveled    Respiratory-  breathing comfortable on room air, no audible wheezing  Cardiovascular- capillary refills less than 2 seconds. No lower extremity edema is noted.   Psychiatric- alert and oriented ×3. Normal affect.    MSK and Neuro: -    Strength is 5 out of 5 in the right lower extremity.  Sensation is intact in the right lower extremity.  No tenderness palpation throughout the spine.  No signs of infection over the injection sites    MEDICAL DECISION MAKING    DATA    Labs:   Lab Results   Component Value Date/Time    SODIUM 141 06/30/2021 01:56 PM    POTASSIUM 4.3 06/30/2021 01:56 PM    CHLORIDE 104 06/30/2021 01:56 PM    CO2 27 06/30/2021 01:56 PM    GLUCOSE 187 (H) 06/30/2021 01:56 PM    BUN 26 (H) 06/30/2021 01:56 PM    CREATININE 1.45 (H) 06/30/2021 01:56 PM    CREATININE 1.4 05/28/2008 05:42 PM    BUNCREATRAT 10 03/27/2017 02:11 PM        Lab Results   Component Value Date/Time    PROTHROMBTM 15.3 (H) 01/22/2020 12:35 PM    INR 1.19 (H) 01/22/2020 12:35 PM        Lab Results   Component Value Date/Time    WBC 8.1 02/25/2021 11:25 AM    RBC 4.73 02/25/2021 11:25 AM    HEMOGLOBIN 13.7 (L) 02/25/2021 11:25 AM    HEMATOCRIT 42.4 02/25/2021 11:25 AM    MCV 89.6 02/25/2021 11:25 AM    MCH 29.0 02/25/2021 11:25 AM    MCHC 32.3 (L) 02/25/2021 11:25 AM    MPV 10.5 02/25/2021 11:25 AM    NEUTSPOLYS 72.40 (H) 02/25/2021 11:25 AM    LYMPHOCYTES 13.40 (L) 02/25/2021 11:25 AM    MONOCYTES 7.10 02/25/2021 11:25 AM    EOSINOPHILS 5.50 02/25/2021 11:25 AM    BASOPHILS 0.70 02/25/2021 11:25 AM    HYPOCHROMIA 1+ 03/21/2012 01:08 PM        Lab Results   Component Value Date/Time    HBA1C 7.2 (H) 06/30/2021 01:56 PM          Imaging:   I personally reviewed following images    I reviewed the fluoroscopic " images from 2020 showing successful right L3-4 and L4-5 transforaminal epidural steroid injection.  I reviewed these with the patient at the visit on 2020.    I reviewed the following radiology reports                                Results for orders placed during the hospital encounter of 20   MR-LUMBAR SPINE-W/O    Impression 1.  Multifocal degenerative disease in the lumbar spine as described above.  2.  Bilateral hydronephrosis and hydroureter. This is noted on the previous CT scan dated 2020.  3.  There is small focus of sclerosis in the L4 vertebral body. This is new since the previous study. This finding likely secondary to the degeneration. However if there is a history of carcinoma the possibility of sclerotic metastasis cannot be   excluded.                                                                  Results for orders placed in visit on 20   DX-CHEST-2 VIEWS    Impression No acute cardiopulmonary disease.                                                                                   DIAGNOSIS   Visit Diagnoses     ICD-10-CM   1. Lumbar radiculopathy  M54.16   2. Left hemiparesis (HCC)  G81.94         ASSESSMENT and PLAN:     Av Allen Lisbeth  1945 male      Av was seen today for follow-up.    Diagnoses and all orders for this visit:    Lumbar radiculopathy    Left hemiparesis (HCC)      I reviewed the images from the above epidural    Overall the patient has significant improvement of symptoms after the epidural steroid injection.  He does still get intermittent flares of pain.  We discussed further imaging and considering for a spine surgery referral but the patient declined.  Given the patient's medical comorbidities he is not interested in surgery at all and regardless of the consequences.  He understands this.  This is also given that his overall symptoms are well controlled.  We discussed emergency cautions.  I also discussed the case with the  patient's daughter.    Medications: Continue gabapentin as needed    Follow up: As needed with me    Thank you for allowing me to participate in the care of this patient. If you have any questions please not hesitate to contact me.             Please note that this dictation was created using voice recognition software. I have made every reasonable attempt to correct obvious errors but there may be errors of grammar and content that I may have overlooked prior to finalization of this note.      Harpal Bennett MD  Interventional Spine and Sports Physiatry  Physical Medicine and Rehabilitation  [facility]

## 2021-09-01 ENCOUNTER — OFFICE VISIT (OUTPATIENT)
Dept: PHYSICAL MEDICINE AND REHAB | Facility: REHABILITATION | Age: 76
End: 2021-09-01
Payer: MEDICARE

## 2021-09-01 VITALS
HEART RATE: 70 BPM | TEMPERATURE: 97.4 F | DIASTOLIC BLOOD PRESSURE: 70 MMHG | SYSTOLIC BLOOD PRESSURE: 116 MMHG | RESPIRATION RATE: 18 BRPM | OXYGEN SATURATION: 98 %

## 2021-09-01 DIAGNOSIS — G81.94 LEFT HEMIPARESIS (HCC): ICD-10-CM

## 2021-09-01 DIAGNOSIS — G81.14 SPASTIC HEMIPLEGIA AFFECTING LEFT NONDOMINANT SIDE, UNSPECIFIED ETIOLOGY (HCC): ICD-10-CM

## 2021-09-01 DIAGNOSIS — Z86.73 HISTORY OF STROKE: ICD-10-CM

## 2021-09-01 DIAGNOSIS — I69.954 HEMIPLEGIA AND HEMIPARESIS FOLLOWING UNSPECIFIED CEREBROVASCULAR DISEASE AFFECTING LEFT NON-DOMINANT SIDE (HCC): Primary | ICD-10-CM

## 2021-09-01 DIAGNOSIS — M62.838 OTHER MUSCLE SPASM: ICD-10-CM

## 2021-09-01 PROCEDURE — 99213 OFFICE O/P EST LOW 20 MIN: CPT | Performed by: PHYSICAL MEDICINE & REHABILITATION

## 2021-09-01 NOTE — PROGRESS NOTES
[facility]  PM&R Neuro Rehabilitation Clinic  [address] [place] [address]  Ph: [phone]    FOLLOW UP OUTPATIENT EVALUATION    Patient Name: Av Bob   Patient : 1945  Patient Age: 76 y.o.     Examining Physician: Dr. Lorie Huff,     PM&R History to Date - Background Information:  Dates of Admission/Discharge to ARU: Larry Williamson - briefly 2017    SUBJECTIVE:   Patient Identification: Av Bob is a 76 y.o. 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  -Usha states that she has seen him use his fingers any pincer grasp motion to open Chapstick for example.  -Continues to do therapy exercises on his own at home.  -No new issues or concerns.    Review of Systems:  All other pertinent positive review of systems are noted above in HPI.   All other systems reviewed and are negative.    Past Medical History:  Past Medical History:   Diagnosis Date   • Pain 2021    right leg foot   • Hypertension 2021    pt states well controlled on meds   • Roberto hematuria 2020   • Influenza A 2020   • Leg edema, right 2020   • Acute on chronic renal failure (HCC) 2020   • Renal mass 2020   • (HCC) Chronic ulcer of right lower extremity with fat layer exposed 2018   •  Enterococcal septicemia ([facility]) 8/12/2017   • Hypomagnesemia 08/12/2017    etiology uncertain   • NSTEMI (non-ST elevated myocardial infarction) ([facility]) 07/18/2017    complicating UTI with sepsis   • Acute respiratory failure with hypoxia ([facility]) 5/20/2017   • Septic shock ([facility]) 5/20/2017   • Normocytic hypochromic anemia 5/20/2017   • Lymphoma ([facility]) 2/19/2017    Large cell   • Cerebrovascular accident (CVA) ([facility]) 12/30/2016    Left arm weakness  etiology of stroke not established, lymphoma discovered on MRI evaluation of stroke, L hemiparesis much worse after acute infectious illness in mid 2017, but no specific diagnosed recurrent neurological etiology, all at [facility]   • Stroke ([facility]) 2016    left sided weakness   • Skin ulcer of calf ([facility]) 2015    Right, Dr. Terry and wound care   • Controlled gout 2014   • Polyneuropathy in diabetes(357.2) 9/11/2013   • Hypokalemia 2012    controlled with combination of ACE inhibitor or ARB plus spironolactone   • Wound of left leg 2012    Requiring surgery and debridment, Dr. Moore   • Nephrolithiasis 2006    right kidney subsequent lithotripsy by Dr. Barry   • CAD (coronary artery disease)     GIOVANNA to RCA in '97, GIOVANNA X2 to LAD and GIOVANNA X2 to OM in '09   • Cancer ([facility])     2017; chemo lympoma non hodgkins lymphoma   • Cataract     dez iol   • Chickenpox    • CKD (chronic kidney disease) stage 3, GFR 30-59 ml/min ([facility])    • Coronary atherosclerosis of native coronary artery     S/P PTCA (percutaneous transluminal coronary angioplasty), RCA, 5/1997, patent on cath 7/10/2009 at the time of interventions on his left anterior descending and circumflex coronary arteries   • Daytime sleepiness    • Depression    • Diabetes ([facility])    • Difficulty swallowing    • Frequent urination    • GERD (gastroesophageal reflux disease)    • Slovak measles    • Insomnia    • Kidney stone    • Mixed hyperlipidemia    • Peripheral vascular disease ([facility])    • Urinary  bladder disorder    • Urinary incontinence     pt wears pads   • Weakness       Past Surgical History:   Procedure Laterality Date   • CO INJ LUMBAR/SACRAL,W/ IMAGING  7/27/2021    Procedure: Lumbar L5-S1 interlaminar epidural steroid injection;  Surgeon: Harpal Bennett M.D.;  Location: SURGERY REHAB PAIN MANAGEMENT;  Service: Pain Management   • PB UPPER GI ENDOSCOPY,DIAGNOSIS N/A 7/21/2021    Procedure: GASTROSCOPY - AND DILATION;  Surgeon: Neville Huerta M.D.;  Location: SURGERY SAME DAY [facility];  Service: Gastroenterology   • PB UPPER GI ENDOSCOPY,BIOPSY N/A 7/21/2021    Procedure: GASTROSCOPY, WITH BIOPSY;  Surgeon: Neville Huerta M.D.;  Location: SURGERY SAME DAY [facility];  Service: Gastroenterology   • LUMBAR TRANSFORAMINAL EPIDURAL STEROID INJECTION Right 3/29/2021    Procedure: RIGHT L3-4 and L4-5 transforaminal epidural steroid injection with fluoroscopic guidance;  Surgeon: Harpal Bennett M.D.;  Location: SURGERY REHAB PAIN MANAGEMENT;  Service: Pain Management   • LUMBAR TRANSFORAMINAL EPIDURAL STEROID INJECTION Right 11/2/2020    Procedure: INJECTION, STEROID, SPINE, LUMBAR, EPIDURAL, TRANSFORAMINAL APPROACH;  Surgeon: Harpal Bennett M.D.;  Location: SURGERY REHAB PAIN MANAGEMENT;  Service: Pain Management   • CYSTOSCOPY STENT PLACEMENT Left 2/12/2018    Procedure: CYSTOSCOPY  ;  Surgeon: Rey Barry M.D.;  Location: [facility];  Service: Urology   • URETEROSCOPY Left 2/12/2018    Procedure: URETEROSCOPY- FLEXIBLE  ;  Surgeon: Rey Barry M.D.;  Location: [facility];  Service: Urology   • LASERTRIPSY Left 2/12/2018    Procedure: LASERTRIPSY - LITHO  ;  Surgeon: Rey Barry M.D.;  Location: [facility];  Service: Urology   • WOUND CLOSURE GENERAL  4/3/2012    Performed by GERHARD GILBERT at SURGERY SAME DAY [facility] ORS   • ZZZ CARDIAC CATH  2009    Stents to LAD, Om   • DAGOBERTO BY LAPAROSCOPY  1998   • ANGIOPLASTY  1997    RCA followed by other stents  as noted above.    • LETITIA CARDIAC CATH  1997    stent RCA   • CATARACT EXTRACTION WITH IOL      bilateral   • LITHOTRIPSY     • TONSILLECTOMY     • TONSILLECTOMY AND ADENOIDECTOMY          Current Outpatient Medications:   •  TRULICITY 3 MG/0.5ML Solution Pen-injector, INJECT 0.5 ML UNDER THE SKIN EVERY 7 DAYS, Disp: 2 mL, Rfl: 4  •  gabapentin (NEURONTIN) 100 MG Cap, TAKE 1 TO 3 CAPSULES BY MOUTH AT BEDTIME AS NEEDED FOR PAIN, Disp: 90 capsule, Rfl: 3  •  fluoxetine (PROZAC) 40 MG capsule, TAKE 1 CAPSULE BY MOUTH EVERY DAY, Disp: 90 capsule, Rfl: 3  •  metoprolol SR (TOPROL XL) 100 MG TABLET SR 24 HR, Take 1 tablet by mouth every day., Disp: 90 tablet, Rfl: 3  •  losartan (COZAAR) 50 MG Tab, Take 1 tablet by mouth 2 times a day., Disp: 180 tablet, Rfl: 3  •  fenofibrate (TRICOR) 145 MG Tab, Take 1 tablet by mouth every day., Disp: 90 tablet, Rfl: 3  •  clopidogrel (PLAVIX) 75 MG Tab, Take 1 tablet by mouth every day., Disp: 90 tablet, Rfl: 3  •  atorvastatin (LIPITOR) 40 MG Tab, Take 1 tablet by mouth every evening., Disp: 90 tablet, Rfl: 3  •  amLODIPine (NORVASC) 5 MG Tab, Take 1 tablet by mouth every day., Disp: 90 tablet, Rfl: 3  •  allopurinol (ZYLOPRIM) 100 MG Tab, TAKE 1 TABLET BY MOUTH EVERY DAY, Disp: 100 tablet, Rfl: 3  •  potassium chloride (KLOR-CON) 8 MEQ tablet, TAKE 1 TABLET BY MOUTH EVERY DAY, Disp: 90 tablet, Rfl: 2  •  triamcinolone acetonide (KENALOG) 0.1 % Cream, , Disp: , Rfl:   •  glucose blood strip, OneTouch Ultra Blue Test Strip  U TO TEST TID, Disp: , Rfl:   •  insulin lispro (HUMALOG) 100 UNIT/ML Solution Pen-injector injection PEN, INJECT 20 UNITS UNDER THE SKIN AS INSTRUCTED DAILY, Disp: 96 mL, Rfl: 2  •  Insulin Pen Needle 32 G x 4 mm (BD PEN NEEDLE SWATI U/F), USE FOR INSULIN SHOTS FIVE TIMES DAILY, Disp: 500 Each, Rfl: 3  •  Multiple Vitamin (MULTI VITAMIN MENS PO), Take  by mouth., Disp: , Rfl:   •  aspirin EC (ECOTRIN) 81 MG Tablet Delayed Response, Take 81 mg by mouth every day.,  Disp: , Rfl:   •  Probiotic Product (PROBIOTIC DAILY PO), Take 1 Cap by mouth 2 Times a Day., Disp: , Rfl:   •  magnesium oxide (MAG-OX) 400 MG Tab, Take 400 mg by mouth every day., Disp: , Rfl:   •  finasteride (PROSCAR) 5 MG Tab, Take 1 Tab by mouth every day., Disp: 30 Tab, Rfl: 0  •  alfuzosin (UROXATRAL) 10 MG SR tablet, Take 10 mg by mouth every day., Disp: , Rfl:   •  methenamine hip (HIPPREX) 1 GM Tab, Take 1 g by mouth 2 times a day., Disp: , Rfl:   •  Insulin Glargine (TOUJEO MAX SOLOSTAR) 300 UNIT/ML Solution Pen-injector, Inject 28 Units as instructed every bedtime., Disp: , Rfl:   •  acetaminophen (TYLENOL) 500 MG Tab, Take 1,000 mg by mouth every 6 hours as needed for Mild Pain or Moderate Pain., Disp: , Rfl:   •  Cholecalciferol (VITAMIN D) 2000 units Cap, Take 4,000 Units by mouth 2 Times a Day. Once a day, Disp: , Rfl:   Allergies   Allergen Reactions   • Diphenhydramine Hcl Anxiety     Pt is able to tolerate  Mg benadryl with less anxiety   • Lorazepam Unspecified     Disorientation   • Ciprofloxacin      Rash,stomach ache   • Spironolactone      Acute kidney injury        Past Social History:  Social History     Socioeconomic History   • Marital status:      Spouse name: Not on file   • Number of children: Not on file   • Years of education: Not on file   • Highest education level: Not on file   Occupational History   • Not on file   Tobacco Use   • Smoking status: Never Smoker   • Smokeless tobacco: Never Used   • Tobacco comment: continued abstinence   Vaping Use   • Vaping Use: Never used   Substance and Sexual Activity   • Alcohol use: Never   • Drug use: Never   • Sexual activity: Not Currently     Partners: Female     Comment: , one daughter, 2 grands   Other Topics Concern   •  Service Yes   • Blood Transfusions No   • Caffeine Concern No   • Occupational Exposure No   • Hobby Hazards No   • Sleep Concern Yes   • Stress Concern No   • Weight Concern No   • Special Diet  No   • Back Care No   • Exercise Yes   • Bike Helmet No   • Seat Belt Yes   • Self-Exams Yes   Social History Narrative    Av is from [place], [place] and raised in [place]. He has been in [place] since 2004. 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  He has been  to Usha since 2003, they met through a mutual friend. He has a daughter (Kalina) and a step son (Jimi).     Social Determinants of Health     Financial Resource Strain:    • Difficulty of Paying Living Expenses:    Food Insecurity:    • Worried About Running Out of Food in the Last Year:    • Ran Out of Food in the Last Year:    Transportation Needs:    • Lack of Transportation (Medical):    • Lack of Transportation (Non-Medical):    Physical Activity:    • Days of Exercise per Week:    • Minutes of Exercise per Session:    Stress:    • Feeling of Stress :    Social Connections:    • Frequency of Communication with Friends and Family:    • Frequency of Social Gatherings with Friends and Family:    • Attends Voodoo Services:    • Active Member of Clubs or Organizations:    • Attends Club or Organization Meetings:    • Marital Status:    Intimate Partner Violence:    • Fear of Current or Ex-Partner:    • Emotionally Abused:    • Physically Abused:    • Sexually Abused:         Family History:  Family History   Problem Relation Age of Onset   • Heart Disease Father         CAD   • Diabetes Father    • Cancer Mother    • Psychiatric Illness Mother         Depression   • Depression Mother    • Kidney stones Brother    • Heart Disease Brother    • Psychiatric Illness Brother         Depression   • Depression Brother    • Suicide Attempts Other    • Psychiatric Illness Other         autism       Depression and Opioid  Screening  PHQ-9:  Depression Screen (PHQ-2/PHQ-9) 4/19/2021 7/20/2021 8/26/2021   PHQ-2 Total Score - - -   PHQ-2 Total Score - - -   PHQ-2 Total Score 0 0 0   PHQ-9 Total Score - - -     Interpretation of PHQ-9 Total Score   Score Severity   1-4 No Depression   5-9 Mild Depression   10-14 Moderate Depression   15-19 Moderately Severe Depression   20-27 Severe Depression     OBJECTIVE:   Vital Signs:  Vitals:    09/01/21 1128   BP: 116/70   Pulse: 70   Resp: 18   Temp: 36.3 °C (97.4 °F)   SpO2: 98%        Pertinent Labs:  Lab Results   Component Value Date/Time    SODIUM 141 06/30/2021 01:56 PM    POTASSIUM 4.3 06/30/2021 01:56 PM    CHLORIDE 104 06/30/2021 01:56 PM    CO2 27 06/30/2021 01:56 PM    GLUCOSE 187 (H) 06/30/2021 01:56 PM    BUN 26 (H) 06/30/2021 01:56 PM    CREATININE 1.45 (H) 06/30/2021 01:56 PM    CREATININE 1.4 05/28/2008 05:42 PM    BUNCREATRAT 10 03/27/2017 02:11 PM       Lab Results   Component Value Date/Time    HBA1C 7.2 (H) 06/30/2021 01:56 PM       Lab Results   Component Value Date/Time    WBC 8.1 02/25/2021 11:25 AM    RBC 4.73 02/25/2021 11:25 AM    HEMOGLOBIN 13.7 (L) 02/25/2021 11:25 AM    HEMATOCRIT 42.4 02/25/2021 11:25 AM    MCV 89.6 02/25/2021 11:25 AM    MCH 29.0 02/25/2021 11:25 AM    MCHC 32.3 (L) 02/25/2021 11:25 AM    MPV 10.5 02/25/2021 11:25 AM    NEUTSPOLYS 72.40 (H) 02/25/2021 11:25 AM    LYMPHOCYTES 13.40 (L) 02/25/2021 11:25 AM    MONOCYTES 7.10 02/25/2021 11:25 AM    EOSINOPHILS 5.50 02/25/2021 11:25 AM    BASOPHILS 0.70 02/25/2021 11:25 AM    HYPOCHROMIA 1+ 03/21/2012 01:08 PM       Lab Results   Component Value Date/Time    ASTSGOT 20 02/25/2021 11:25 AM    ALTSGPT 18 02/25/2021 11:25 AM        Physical Exam:   GEN: No apparent distress  HEENT: Head normocephalic, atraumatic.  Sclera nonicteric bilaterally, no ocular discharge appreciated bilaterally.  CV: Extremities warm and well-perfused, no peripheral edema appreciated bilaterally.  PULMONARY: Breathing nonlabored  on room air, no respiratory accessory muscle use.  Not requiring supplemental oxygen.  SKIN: No appreciable skin breakdown on exposed areas of skin.  PSYCH: Mood and affect within normal limits.  NEURO: Awake alert.  Conversational.  Logical thought content.  Presents in manual wheelchair.  Moves all extremities volitionally.  Left hemiparesis.  Left AFO donned.  Contracture of left knee in flexion    Tone on Modified Deuce Scale    L  L   Shoulder Adduction  Hip Flexion    Elbow Flexion 0 Hip Extension    Elbow Extension 1* Hip Adduction    Wrist Flexion 0 Knee Extension 4*   Finger Flexion 1 Knee Flexion 0     Dorsiflexion AFO     Plantar Flexion AFO   *Suspect some degree of underlying knee flexor contracture and elbow flexion contracture.    ASSESSMENT/PLAN: Av Bob  is a 76 y.o. male with rehabilitation history significant for R CVA 12/31/16 with residual left hemiparesis, general debility, here for evaluation. The following plan was discussed with the patient who is in agreement.     Visit Diagnoses     ICD-10-CM   1. Hemiplegia and hemiparesis following unspecified cerebrovascular disease affecting left non-dominant side ([facility])  I69.954   2. Left hemiparesis ([facility])  G81.94   3. Spastic hemiplegia affecting left nondominant side, unspecified etiology ([facility])  G81.14   4. Other muscle spasm  M62.838   5. History of stroke. followed by Dr Huff with neuro rehab  Z86.73        Wife assists with history.    Rehab/Neuro:   1. 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  States level of debility is out of proportion for how well he recovered after stroke. Even before norovirus virus, while in chemo was able to go to [place], no assistive device. Can use walker a little bit, but fatigues. Also can walk with cane with exercise  or therapy.  2. General debility: ?   CIP/CIM when acutely hospitalized several years ago with norovirus  3. Fatigue: Secondary to debility vs post stroke fatigue vs sleep apnea versus combination of all of the aforementioned.  Has BiPAP, does not tolerate BiPAP.  Also has low T, followed by endocrinology.  -Med management: On Plavix/ASA for stroke secondary prophylaxis  -Therapy: Continue therapy with OT/PT and exercise .  Agree with occupational therapist assessment that ultimately it is up to the patient to continue home exercise program to see continued improvement in function and range of motion.  Counseled on the aforementioned.    Spasticity: Significant in the left upper extremity, more minimal in the left lower extremity.  Affects wrist flexors primarily.  Concern for hamstring spasticity limiting full range of motion of the left knee extension.  Left upper extremity elbow extension and wrist extension improved with Botox 5/11/2021.  Significantly improved after Botox 8/12/2021-using pincer grasp to open Chapstick and wife notes that he is using the left upper extremity more often than he had been.  -Med management: No pharmacologic agents, caused too much sedation and other cognitive side effects.  -Referral: Physical medicine and rehabilitation for Botox December 2021    Okay to continue antiplatelet with aspirin and Plavix through the procedure for botulinum toxin injection.  The risks of going off the medication outweigh the risks of doing the procedure on the medication.  I will plan to use 27-gauge needles and ultrasound guidance to avoid all blood vessels during the procedure.  We discussed that while on anticoagulation the patient does have an increased risk of bleeding and hematoma     Botox Plan: I plan to inject to the left upper extremity and left lower extremity  Location Botox Amount in Units   Left upper extremity  muscles to be determined at time of injection   Total Units  400 units       The risks benefits and  alternatives to this procedure were discussed and the patient wishes to proceed with the procedure. Risks include but are not limited to damage to surrounding structures, infection, bleeding, worsening of pain which can be permanent, weakness which can be permanent. Benefits include pain relief, improved function. Alternatives includes not doing the procedure.      Counseled and discussed that if he is still doing well in terms of his range of motion and activity with the left upper extremity after the Botox is sufficiently worn off then we can consider postponing again.  Currently have authorization to proceed with Botox for now.    Neuropathic Pain/Nociceptive Pain: Right back/hip/leg pain with cramping/numbness - suspect potential lumbo-sacral radiculitis.  No significant motor deficit on the right.  Last lumbar MRI 2005.  Denies saddle anesthesia but does state have urinary incontinence which he cannot sense, but mostly has sensation of full bladder and bowel.  Complicated by PVD, patient declined angioplasty. MRI revealed diffuse disc bulge at L3-4,4-5. 1/2021 s/p right TESI L3-4, L4-5 with significant improvement.  Recent repeat injection 3/29/2021 which was not as efficacious as the first time.  -Discharged from pain management with follow-up as needed.    Follow up: December 2021 for repeat Botox injections potentially.    Total time spent was 22 minutes.  Included in this time is the time spent preparing for the visit including record review, my exam and evaluation, counseling and education regarding that which is aforementioned in the assessment and plan. Included this time as the time spent obtaining history from someone other than the patient. Discussion involved the patient and wife.    Please note that this dictation was created using voice recognition software. I have made every reasonable attempt to correct obvious errors but there may be errors of grammar and content that I may have overlooked prior  to finalization of this note.    Dr. Lorie Huff DO, MS  Department of Physical Medicine & Rehabilitation  Neuro Rehabilitation [facility]

## 2021-09-01 NOTE — PROCEDURES
[facility]  Physical Medicine & Rehabilitation Clinic  [address] [place] [address]  Ph: [phone]    PROCEDURE NOTE    Procedure: Botulinum Injection  Primary Diagnosis & Secondary Diagnoses:     {No diagnosis found. (Refresh or delete this SmartLink)}  Vitals:    09/01/21 1128   BP: 116/70   Pulse: 70   Resp: 18   Temp: 36.3 °C (97.4 °F)   SpO2: 98%       INFORMED CONSENT   The risks and benefits of the procedure were explained to the patient, and all questions were answered. Benefits of the injection include spasticity reduction by decrease in muscle activation following toxin injection. Adverse effects from toxin injection include but are not limited to: excessive weakness, infection, breathing and/or swallowing difficulty.  Common adverse effects from the injection itself are pain and bruising. The patient demonstrated good understanding of risks and benefits and consents to botulinum toxin injection.     Received botulinum toxin product in last 3 mos: No  Have recently received abx (aminoglycosides): No  Take anti-spasticity medications: Yes  Take allergy/cold medicine:  No  Take a sleep medicine: No  Lactating/pregnant: No  Known NMJ disease: No  Human albumin allergy: No    Pre-procedure time out was performed.     PROCEDURE:  Usual sterile procedure was observed with sterile prep with chlorhexidine. Ultrasound was used for needle guidance for the injections in the following muscles. A negative aspiration of blood was obtained, and the following was injected into each muscle.    Botox: left upper extremity  Location Botox Amount in Units   Biceps    50 units   FCR  75 units   FCU  75 units   Pronator teres   50 units   FDS   75 units   FDP   75 units   Total Units  400 units      Patient did not get as much benefit from lower extremity Botox at last visit so today we decided to increase the number of muscles targeted in the left upper extremity for maximal benefit.    Injection sites were cleaned  with alcohol and band aid was applied afterwards.  The patient tolerated the procedure well.  There were no complications.  The images were uploaded for permanent storage    MEDICATION USED:  100 units of botulinum toxin type A, mixed with 2mL of sterile, preservative-free normal saline in two 1cc syringes, for a total of 50 units in 1 mL. Repeated for other vials.    Total units injected: 400 units  Total units discarded: 0 units    See MAR for Botox details.     Counseling: Pt was instructed to seek immediate medical attention if fever, difficulty breathing, difficulty swallowing, or other concerning sxs arise. RTC in 2-4 weeks to investigate for effect at peak.    Additional Plan:   -Continue aggressive stretching exercises     BOTOX (4 vials)  NDC 1015-2175-15  Lot [id number]  Exp 10/2023    Dr. Lorie Huff DO, MS  Department of Physical Medicine & Rehabilitation  Neuro Rehabilitation Clinic  [facility]

## 2021-09-14 ENCOUNTER — OFFICE VISIT (OUTPATIENT)
Dept: MEDICAL GROUP | Facility: PHYSICIAN GROUP | Age: 76
End: 2021-09-14
Payer: MEDICARE

## 2021-09-14 VITALS
BODY MASS INDEX: 29.35 KG/M2 | SYSTOLIC BLOOD PRESSURE: 106 MMHG | HEIGHT: 70 IN | WEIGHT: 205 LBS | DIASTOLIC BLOOD PRESSURE: 64 MMHG | HEART RATE: 75 BPM | TEMPERATURE: 97.9 F | RESPIRATION RATE: 12 BRPM | OXYGEN SATURATION: 96 %

## 2021-09-14 DIAGNOSIS — H00.015 HORDEOLUM EXTERNUM OF LEFT LOWER EYELID: ICD-10-CM

## 2021-09-14 PROCEDURE — 99213 OFFICE O/P EST LOW 20 MIN: CPT | Performed by: INTERNAL MEDICINE

## 2021-09-14 RX ORDER — OMEPRAZOLE 20 MG/1
20 CAPSULE, DELAYED RELEASE ORAL DAILY
Status: ON HOLD | COMMUNITY
Start: 2021-08-22 | End: 2022-06-20 | Stop reason: SDUPTHER

## 2021-09-14 RX ORDER — BACITRACIN ZINC AND POLYMYXIN B SULFATE 500; 10000 [USP'U]/G; [USP'U]/G
1 OINTMENT OPHTHALMIC EVERY 12 HOURS
Qty: 3.5 G | Refills: 0 | Status: SHIPPED | OUTPATIENT
Start: 2021-09-14 | End: 2021-10-13

## 2021-09-14 ASSESSMENT — FIBROSIS 4 INDEX: FIB4 SCORE: 1.178511301977579207

## 2021-09-14 NOTE — TELEPHONE ENCOUNTER
Patient called stating that he would like to have labs drawn before his apt 10/07/21. Please place lab orders if needed.     He can be reached at [phone]    Thanks    Pt would like an order of test strips sent to [facility] on [address] please. Brand: (One touch ultra) Thank you.

## 2021-09-15 NOTE — ASSESSMENT & PLAN NOTE
New problem, ongoing, slowly improving. 3 week history of left lower eyelid hordoleum with associated purulent drainage and erythema, edema, and pain of eyelid. Will prescribed polysporin twice daily as needed if not continuing to improve due to history of DM2. Continue with warm compresses. Will keep me updated if not improving in the coming weeks.

## 2021-09-15 NOTE — PROGRESS NOTES
Subjective:   Chief Complaint/History of Present Illness:  Av Bob is a 76 y.o. male established patient who presents today to discuss medical problems as listed below. Av is accompanied by his wife, Usha.    Problem   Hordeolum Externum of Left Lower Eyelid    He reports to clinic with 3 weeks of erythema and pain of the left lower eyelid. No clear inciting cause. Reports no globe pain. Lower > upper eyelid swelling and erythema. After 1.5 weeks had drainage of purulence from the lower medial eyelid. No photosensitivity. Using warm compresses. No changes in visual acuity. Saw retinal specialist around day 1 or 2 of symptoms who felt it could be related to smoke. History of DM2 on insulin.          Current Medications:  Current Outpatient Medications Ordered in Epic   Medication Sig Dispense Refill   • omeprazole (PRILOSEC) 20 MG delayed-release capsule TAKE 1 CAPSULE BY MOUTH EVERY DAY 30 MINUTES BEFORE BREAKFAST MEAL     • bacitracin-polymyxin b (POLYSPORIN) [id number] UNIT/GM Ointment Apply 1 Application to left eye every 12 hours. 3.5 g 0   • TRULICITY 3 MG/0.5ML Solution Pen-injector INJECT 0.5 ML UNDER THE SKIN EVERY 7 DAYS 2 mL 4   • gabapentin (NEURONTIN) 100 MG Cap TAKE 1 TO 3 CAPSULES BY MOUTH AT BEDTIME AS NEEDED FOR PAIN 90 capsule 3   • fluoxetine (PROZAC) 40 MG capsule TAKE 1 CAPSULE BY MOUTH EVERY DAY 90 capsule 3   • metoprolol SR (TOPROL XL) 100 MG TABLET SR 24 HR Take 1 tablet by mouth every day. 90 tablet 3   • losartan (COZAAR) 50 MG Tab Take 1 tablet by mouth 2 times a day. 180 tablet 3   • fenofibrate (TRICOR) 145 MG Tab Take 1 tablet by mouth every day. 90 tablet 3   • clopidogrel (PLAVIX) 75 MG Tab Take 1 tablet by mouth every day. 90 tablet 3   • atorvastatin (LIPITOR) 40 MG Tab Take 1 tablet by mouth every evening. 90 tablet 3   • amLODIPine (NORVASC) 5 MG Tab Take 1 tablet by mouth every day. 90 tablet 3   • allopurinol (ZYLOPRIM) 100 MG Tab TAKE 1 TABLET BY MOUTH EVERY  " tablet 3   • potassium chloride (KLOR-CON) 8 MEQ tablet TAKE 1 TABLET BY MOUTH EVERY DAY 90 tablet 2   • glucose blood strip OneTouch Ultra Blue Test Strip   U TO TEST TID     • insulin lispro (HUMALOG) 100 UNIT/ML Solution Pen-injector injection PEN INJECT 20 UNITS UNDER THE SKIN AS INSTRUCTED DAILY 96 mL 2   • Insulin Pen Needle 32 G x 4 mm (BD PEN NEEDLE SWATI U/F) USE FOR INSULIN SHOTS FIVE TIMES DAILY 500 Each 3   • Multiple Vitamin (MULTI VITAMIN MENS PO) Take  by mouth.     • aspirin EC (ECOTRIN) 81 MG Tablet Delayed Response Take 81 mg by mouth every day.     • Probiotic Product (PROBIOTIC DAILY PO) Take 1 Cap by mouth 2 Times a Day.     • magnesium oxide (MAG-OX) 400 MG Tab Take 400 mg by mouth every day.     • finasteride (PROSCAR) 5 MG Tab Take 1 Tab by mouth every day. 30 Tab 0   • alfuzosin (UROXATRAL) 10 MG SR tablet Take 10 mg by mouth every day.     • methenamine hip (HIPPREX) 1 GM Tab Take 1 g by mouth 2 times a day.     • Insulin Glargine (TOUJEO MAX SOLOSTAR) 300 UNIT/ML Solution Pen-injector Inject 28 Units as instructed every bedtime.     • acetaminophen (TYLENOL) 500 MG Tab Take 1,000 mg by mouth every 6 hours as needed for Mild Pain or Moderate Pain.     • Cholecalciferol (VITAMIN D) 2000 units Cap Take 4,000 Units by mouth 2 Times a Day. Once a day       No current [facility]-ordered facility-administered medications on file.          Objective:   Physical Exam:    Vitals: /64 (BP Location: Right arm, Patient Position: Sitting, BP Cuff Size: Adult)   Pulse 75   Temp 36.6 °C (97.9 °F) (Temporal)   Resp 12   Ht 1.765 m (5' 9.5\")   Wt 93 kg (205 lb)   SpO2 96%    BMI: Body mass index is 29.84 kg/m².  Physical Exam  Constitutional:       General: He is not in acute distress.     Appearance: He is not ill-appearing.   Eyes:      Comments: Erythema and edema of left lower eye lid, medial > lateral aspect. No purulence at this time. No conjunctivitis. Prior evidence of stye that appears " to have drained.    Pulmonary:      Effort: Pulmonary effort is normal. No respiratory distress.   Psychiatric:         Mood and Affect: Mood normal.         Behavior: Behavior normal.         Thought Content: Thought content normal.         Judgment: Judgment normal.               Assessment and Plan:   Av is a 76 y.o. male with the following:  Problem List Items Addressed This Visit     Hordeolum externum of left lower eyelid     New problem, ongoing, slowly improving. 3 week history of left lower eyelid hordoleum with associated purulent drainage and erythema, edema, and pain of eyelid. Will prescribed polysporin twice daily as needed if not continuing to improve due to history of DM2. Continue with warm compresses. Will keep me updated if not improving in the coming weeks.         Relevant Medications    bacitracin-polymyxin b (POLYSPORIN) [id number] UNIT/GM Ointment           RTC: Return in about 1 month (around 10/14/2021).    I spent a total of 24 minutes with record review, exam, communication with the patient, communication with other providers, and documentation of this encounter.    PLEASE NOTE: This dictation was created using voice recognition software. I have made every reasonable attempt to correct obvious errors, but I expect that there are errors of grammar and possibly content that I did not discover before finalizing the note.      Madison Bunch, DO  Geriatric and Internal Medicine  [facility]

## 2021-09-17 ENCOUNTER — TELEPHONE (OUTPATIENT)
Dept: MEDICAL GROUP | Facility: PHYSICIAN GROUP | Age: 76
End: 2021-09-17

## 2021-09-17 ENCOUNTER — PATIENT MESSAGE (OUTPATIENT)
Dept: ENDOCRINOLOGY | Facility: MEDICAL CENTER | Age: 76
End: 2021-09-17

## 2021-09-17 DIAGNOSIS — E11.65 UNCONTROLLED TYPE 2 DIABETES MELLITUS WITH HYPERGLYCEMIA (HCC): ICD-10-CM

## 2021-09-17 DIAGNOSIS — H00.015 HORDEOLUM EXTERNUM OF LEFT LOWER EYELID: ICD-10-CM

## 2021-09-17 RX ORDER — INSULIN GLARGINE 300 U/ML
45 INJECTION, SOLUTION SUBCUTANEOUS
Qty: 45 ML | Refills: 3 | Status: SHIPPED | OUTPATIENT
Start: 2021-09-17 | End: 2021-10-13

## 2021-09-17 RX ORDER — BACITRACIN ZINC AND POLYMYXIN B SULFATE 500; 1000 [USP'U]/G; [USP'U]/G
OINTMENT TOPICAL
Qty: 3.5 G | Refills: 0 | Status: SHIPPED | OUTPATIENT
Start: 2021-09-17 | End: 2021-10-13

## 2021-09-17 NOTE — TELEPHONE ENCOUNTER
Called patients pharmacy to confirm it wasn't there. Pharmacy states they never got it. Pended new prescriptions, please advise.

## 2021-09-17 NOTE — TELEPHONE ENCOUNTER
From: Av Bob  To: Nurse Practitioner Lo Baxter  Sent: 9/17/2021 10:38 AM PDT  Subject: Refill ok for Toujeo    [facility] has delayed Av’s prescription for Toujeo because they are trying to get approval from the prescriber, who was Dr. Rasheed. Could you send in a new prescription for the Toujeo to expedite this order? He’s really low in his last pen.    Thank you,  Usha Bob

## 2021-09-24 ENCOUNTER — HOSPITAL ENCOUNTER (OUTPATIENT)
Dept: LAB | Facility: MEDICAL CENTER | Age: 76
End: 2021-09-24
Attending: INTERNAL MEDICINE
Payer: MEDICARE

## 2021-09-24 DIAGNOSIS — N18.31 STAGE 3A CHRONIC KIDNEY DISEASE: ICD-10-CM

## 2021-09-24 DIAGNOSIS — I10 ESSENTIAL HYPERTENSION: ICD-10-CM

## 2021-09-24 DIAGNOSIS — E55.9 VITAMIN D DEFICIENCY: ICD-10-CM

## 2021-09-24 LAB
ANION GAP SERPL CALC-SCNC: 10 MMOL/L (ref 7–16)
BUN SERPL-MCNC: 28 MG/DL (ref 8–22)
CALCIUM SERPL-MCNC: 9.1 MG/DL (ref 8.5–10.5)
CHLORIDE SERPL-SCNC: 104 MMOL/L (ref 96–112)
CO2 SERPL-SCNC: 24 MMOL/L (ref 20–33)
CREAT SERPL-MCNC: 1.44 MG/DL (ref 0.5–1.4)
GLUCOSE SERPL-MCNC: 256 MG/DL (ref 65–99)
POTASSIUM SERPL-SCNC: 3.9 MMOL/L (ref 3.6–5.5)
PTH-INTACT SERPL-MCNC: 23.6 PG/ML (ref 14–72)
SODIUM SERPL-SCNC: 138 MMOL/L (ref 135–145)

## 2021-09-24 PROCEDURE — 80048 BASIC METABOLIC PNL TOTAL CA: CPT

## 2021-09-24 PROCEDURE — 83970 ASSAY OF PARATHORMONE: CPT

## 2021-09-24 PROCEDURE — 36415 COLL VENOUS BLD VENIPUNCTURE: CPT

## 2021-09-30 ENCOUNTER — OFFICE VISIT (OUTPATIENT)
Dept: NEPHROLOGY | Facility: MEDICAL CENTER | Age: 76
End: 2021-09-30
Payer: MEDICARE

## 2021-09-30 VITALS
WEIGHT: 205 LBS | RESPIRATION RATE: 16 BRPM | DIASTOLIC BLOOD PRESSURE: 78 MMHG | HEART RATE: 73 BPM | BODY MASS INDEX: 29.35 KG/M2 | TEMPERATURE: 98.2 F | HEIGHT: 70 IN | OXYGEN SATURATION: 98 % | SYSTOLIC BLOOD PRESSURE: 122 MMHG

## 2021-09-30 DIAGNOSIS — E11.29 MICROALBUMINURIA DUE TO TYPE 2 DIABETES MELLITUS (HCC): ICD-10-CM

## 2021-09-30 DIAGNOSIS — R33.9 URINARY RETENTION: ICD-10-CM

## 2021-09-30 DIAGNOSIS — E55.9 VITAMIN D DEFICIENCY: ICD-10-CM

## 2021-09-30 DIAGNOSIS — I10 ESSENTIAL HYPERTENSION: ICD-10-CM

## 2021-09-30 DIAGNOSIS — D64.9 ANEMIA, UNSPECIFIED TYPE: ICD-10-CM

## 2021-09-30 DIAGNOSIS — R80.9 MICROALBUMINURIA DUE TO TYPE 2 DIABETES MELLITUS (HCC): ICD-10-CM

## 2021-09-30 DIAGNOSIS — N18.31 STAGE 3A CHRONIC KIDNEY DISEASE: ICD-10-CM

## 2021-09-30 PROCEDURE — 99214 OFFICE O/P EST MOD 30 MIN: CPT | Performed by: INTERNAL MEDICINE

## 2021-09-30 ASSESSMENT — ENCOUNTER SYMPTOMS
HEMOPTYSIS: 0
EYES NEGATIVE: 1
BACK PAIN: 1
MYALGIAS: 0
NAUSEA: 0
VOMITING: 0
WHEEZING: 0
PALPITATIONS: 0
ORTHOPNEA: 0
SHORTNESS OF BREATH: 0
FEVER: 0
ABDOMINAL PAIN: 0
COUGH: 0
SINUS PAIN: 0
CHILLS: 0
WEIGHT LOSS: 0
FLANK PAIN: 0
DIARRHEA: 0

## 2021-09-30 ASSESSMENT — FIBROSIS 4 INDEX: FIB4 SCORE: 1.178511301977579207

## 2021-09-30 NOTE — PROGRESS NOTES
Subjective:      Av Bob is a 76 y.o. male who presents with Follow-Up and Chronic Kidney Disease            Chronic Kidney Disease  Pertinent negatives include no abdominal pain, chest pain, chills, congestion, coughing, fever, myalgias, nausea or vomiting.     Av is coming today for f/u of CKD III, microalbuminuria  recurrent UTI,   Doing well, no complaints  Creat level stable at 1.4 -baseline  No difficulties to urinate.No dysuria No flank pain  HTN: BP well controlled  Vit D and PTH well controlled  Anemia: Hb level -improved     Review of Systems   Constitutional: Negative for chills, fever, malaise/fatigue and weight loss.   HENT: Negative for congestion, hearing loss and sinus pain.    Eyes: Negative.    Respiratory: Negative for cough, hemoptysis, shortness of breath and wheezing.    Cardiovascular: Negative for chest pain, palpitations, orthopnea and leg swelling.   Gastrointestinal: Negative for abdominal pain, diarrhea, nausea and vomiting.   Genitourinary: Negative for dysuria, flank pain, frequency, hematuria and urgency.   Musculoskeletal: Positive for back pain. Negative for myalgias.   Skin: Negative.    All other systems reviewed and are negative.    Past Medical History:   Diagnosis Date   • (Formerly [facility]) Chronic ulcer of right lower extremity with fat layer exposed 12/27/2018   • Acute on chronic renal failure (Formerly [facility]) 1/21/2020   • Acute respiratory failure with hypoxia (Formerly [facility]) 5/20/2017   • CAD (coronary artery disease)     GIOVANNA to RCA in '97, GIOVANNA X2 to LAD and GIOVANNA X2 to OM in '09   • Cancer (Formerly [facility])     2017; chemo lympoma non hodgkins lymphoma   • Cataract     dez iol   • Cerebrovascular accident (CVA) (Formerly [facility]) 12/30/2016    Left arm weakness  etiology of stroke not established, lymphoma discovered on MRI evaluation of stroke, L hemiparesis much worse after acute infectious illness in mid 2017, but no specific diagnosed recurrent neurological etiology, all at [facility]  [place]   • Chickenpox    • CKD (chronic kidney disease) stage 3, GFR 30-59 ml/min ([facility])    • Controlled gout 2014   • Coronary atherosclerosis of native coronary artery     S/P PTCA (percutaneous transluminal coronary angioplasty), RCA, 5/1997, patent on cath 7/10/2009 at the time of interventions on his left anterior descending and circumflex coronary arteries   • Daytime sleepiness    • Depression    • Diabetes ([facility])    • Difficulty swallowing    • Enterococcal septicemia ([facility]) 8/12/2017   • Roberto hematuria 1/29/2020   • Frequent urination    • GERD (gastroesophageal reflux disease)    • Belgian measles    • Hypertension 06/30/2021    pt states well controlled on meds   • Hypokalemia 2012    controlled with combination of ACE inhibitor or ARB plus spironolactone   • Hypomagnesemia 08/12/2017    etiology uncertain   • Influenza A 1/26/2020   • Insomnia    • Kidney stone    • Leg edema, right 1/22/2020   • 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 Barry   • Normocytic hypochromic anemia 5/20/2017   • NSTEMI (non-ST elevated myocardial infarction) ([facility]) 07/18/2017    complicating UTI with sepsis   • Pain 06/30/2021    right leg foot   • Peripheral vascular disease ([facility])    • Polyneuropathy in diabetes(357.2) 9/11/2013   • Renal mass 1/21/2020   • Septic shock ([facility]) 5/20/2017   • Skin ulcer of calf ([facility]) 2015    Right, Dr. Terry and wound care   • Stroke ([facility]) 2016    left sided weakness   • Urinary bladder disorder    • Urinary incontinence     pt wears pads   • Weakness    • Wound of left leg 2012    Requiring surgery and debridment, Dr. Moore       Family History   Problem Relation Age of Onset   • Heart Disease Father         CAD   • Diabetes Father    • Cancer Mother    • Psychiatric Illness Mother         Depression   • Depression Mother    • Kidney stones Brother    • Heart Disease Brother    • Psychiatric  Illness Brother         Depression   • Depression Brother    • Suicide Attempts Other    • Psychiatric Illness Other         autism       Social History     Socioeconomic History   • Marital status:      Spouse name: Not on file   • Number of children: Not on file   • Years of education: Not on file   • Highest education level: Not on file   Occupational History   • Not on file   Tobacco Use   • Smoking status: Never Smoker   • Smokeless tobacco: Never Used   • Tobacco comment: continued abstinence   Vaping Use   • Vaping Use: Never used   Substance and Sexual Activity   • Alcohol use: Never   • Drug use: Never   • Sexual activity: Not Currently     Partners: Female     Comment: , one daughter, 2 grands   Other Topics Concern   •  Service Yes   • Blood Transfusions No   • Caffeine Concern No   • Occupational Exposure No   • Hobby Hazards No   • Sleep Concern Yes   • Stress Concern No   • Weight Concern No   • Special Diet No   • Back Care No   • Exercise Yes   • Bike Helmet No   • Seat Belt Yes   • Self-Exams Yes   Social History Narrative    Av is from [place], [place] and raised in [place]. He has been in [place] since 2004. 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  He has been  to Usha since 2003, they met through a mutual friend. He has a daughter (Kalina) and a step son (Jimi).     Social Determinants of Health     Financial Resource Strain:    • Difficulty of Paying Living Expenses:    Food Insecurity:    • Worried About Running Out of Food in the Last Year:    • Ran Out of Food in the Last Year:    Transportation Needs:    • Lack of Transportation (Medical):    • Lack of Transportation (Non-Medical):    Physical Activity:    • Days of Exercise per  "Week:    • Minutes of Exercise per Session:    Stress:    • Feeling of Stress :    Social Connections:    • Frequency of Communication with Friends and Family:    • Frequency of Social Gatherings with Friends and Family:    • Attends [employer] Services:    • Active Member of Clubs or Organizations:    • Attends Club or Organization Meetings:    • Marital Status:    Intimate Partner Violence:    • Fear of Current or Ex-Partner:    • Emotionally Abused:    • Physically Abused:    • Sexually Abused:           Objective:     /78 (BP Location: Right arm, Patient Position: Sitting)   Pulse 73   Temp 36.8 °C (98.2 °F) (Temporal)   Resp 16   Ht 1.778 m (5' 10\")   Wt 93 kg (205 lb)   SpO2 98%   BMI 29.41 kg/m²          Physical Exam  Vitals and nursing note reviewed.   Constitutional:       General: He is not in acute distress.     Appearance: Normal appearance. He is well-developed. He is not diaphoretic.   HENT:      Head: Normocephalic and atraumatic.      Nose: Nose normal.      Mouth/Throat:      Mouth: Mucous membranes are moist.      Pharynx: Oropharynx is clear.   Eyes:      Extraocular Movements: Extraocular movements intact.      Conjunctiva/sclera: Conjunctivae normal.      Pupils: Pupils are equal, round, and reactive to light.   Cardiovascular:      Rate and Rhythm: Normal rate and regular rhythm.      Pulses: Normal pulses.      Heart sounds: Normal heart sounds. No friction rub. No gallop.    Pulmonary:      Effort: Pulmonary effort is normal. No respiratory distress.      Breath sounds: Normal breath sounds. No wheezing, rhonchi or rales.   Abdominal:      General: Bowel sounds are normal. There is no distension.      Palpations: Abdomen is soft. There is no mass.      Tenderness: There is no abdominal tenderness. There is no right CVA tenderness, left CVA tenderness or guarding.   Musculoskeletal:      Cervical back: Normal range of motion and neck supple.      Right lower leg: No edema.      " Left lower leg: No edema.   Skin:     General: Skin is warm.      Findings: No erythema or rash.   Neurological:      Mental Status: He is alert and oriented to person, place, and time.      Cranial Nerves: No cranial nerve deficit.      Coordination: Coordination normal.   Psychiatric:         Mood and Affect: Mood normal.         Behavior: Behavior normal.         Thought Content: Thought content normal.         Judgment: Judgment normal.            Laboratory results reviewed: d/w pt    Lab Results   Component Value Date/Time    CREATININE 1.44 (H) 09/24/2021 01:26 PM    CREATININE 1.4 05/28/2008 05:42 PM    POTASSIUM 3.9 09/24/2021 01:26 PM     Assessment and Plan    1.CKD IIIa-creat stable at baseline -to monitor  2.HTN: BP well controlled -to monitor  3.Electrolytes: well controlled.  4.Anemia: Hb level improved- stable  5.Urinary retention/hydronephrosis -f/u with Urology  6.Volume:well controlled  7.Microalbuminuria due to DM II - well controlled to monitor -on ARB  8.Vit D def: vit D and PTH well controlled -WNL    Recs: continue current treatment             Keep well hydrated             Low Na diet             F/u with Urology             Monitor BP             F/u here in 6 months

## 2021-10-06 ENCOUNTER — HOSPITAL ENCOUNTER (OUTPATIENT)
Dept: LAB | Facility: MEDICAL CENTER | Age: 76
End: 2021-10-06
Attending: INTERNAL MEDICINE
Payer: MEDICARE

## 2021-10-06 DIAGNOSIS — Z79.4 TYPE 2 DIABETES MELLITUS WITH OTHER SPECIFIED COMPLICATION, WITH LONG-TERM CURRENT USE OF INSULIN (HCC): ICD-10-CM

## 2021-10-06 DIAGNOSIS — E11.69 TYPE 2 DIABETES MELLITUS WITH OTHER SPECIFIED COMPLICATION, WITH LONG-TERM CURRENT USE OF INSULIN (HCC): ICD-10-CM

## 2021-10-06 DIAGNOSIS — E11.69 HYPERLIPIDEMIA ASSOCIATED WITH TYPE 2 DIABETES MELLITUS (HCC): ICD-10-CM

## 2021-10-06 DIAGNOSIS — E78.5 HYPERLIPIDEMIA ASSOCIATED WITH TYPE 2 DIABETES MELLITUS (HCC): ICD-10-CM

## 2021-10-06 LAB
ALBUMIN SERPL BCP-MCNC: 3.6 G/DL (ref 3.2–4.9)
ALBUMIN/GLOB SERPL: 1.6 G/DL
ALP SERPL-CCNC: 60 U/L (ref 30–99)
ALT SERPL-CCNC: 16 U/L (ref 2–50)
ANION GAP SERPL CALC-SCNC: 12 MMOL/L (ref 7–16)
AST SERPL-CCNC: 24 U/L (ref 12–45)
BILIRUB SERPL-MCNC: 0.7 MG/DL (ref 0.1–1.5)
BUN SERPL-MCNC: 27 MG/DL (ref 8–22)
CALCIUM SERPL-MCNC: 9.2 MG/DL (ref 8.5–10.5)
CHLORIDE SERPL-SCNC: 107 MMOL/L (ref 96–112)
CHOLEST SERPL-MCNC: 135 MG/DL (ref 100–199)
CHOLEST SERPL-MCNC: 138 MG/DL (ref 100–199)
CO2 SERPL-SCNC: 24 MMOL/L (ref 20–33)
CREAT SERPL-MCNC: 1.44 MG/DL (ref 0.5–1.4)
CREAT UR-MCNC: 62.4 MG/DL
EST. AVERAGE GLUCOSE BLD GHB EST-MCNC: 157 MG/DL
FASTING STATUS PATIENT QL REPORTED: NORMAL
FASTING STATUS PATIENT QL REPORTED: NORMAL
GLOBULIN SER CALC-MCNC: 2.3 G/DL (ref 1.9–3.5)
GLUCOSE SERPL-MCNC: 93 MG/DL (ref 65–99)
HBA1C MFR BLD: 7.1 % (ref 4–5.6)
HDLC SERPL-MCNC: 27 MG/DL
HDLC SERPL-MCNC: 27 MG/DL
LDLC SERPL CALC-MCNC: 87 MG/DL
LDLC SERPL CALC-MCNC: 89 MG/DL
MICROALBUMIN UR-MCNC: 2.6 MG/DL
MICROALBUMIN/CREAT UR: 42 MG/G (ref 0–30)
POTASSIUM SERPL-SCNC: 3.8 MMOL/L (ref 3.6–5.5)
PROT SERPL-MCNC: 5.9 G/DL (ref 6–8.2)
SODIUM SERPL-SCNC: 143 MMOL/L (ref 135–145)
T4 FREE SERPL-MCNC: 1.48 NG/DL (ref 0.93–1.7)
TRIGL SERPL-MCNC: 106 MG/DL (ref 0–149)
TRIGL SERPL-MCNC: 108 MG/DL (ref 0–149)
TSH SERPL DL<=0.005 MIU/L-ACNC: 1.33 UIU/ML (ref 0.38–5.33)

## 2021-10-06 PROCEDURE — 83036 HEMOGLOBIN GLYCOSYLATED A1C: CPT | Mod: GA

## 2021-10-06 PROCEDURE — 80053 COMPREHEN METABOLIC PANEL: CPT

## 2021-10-06 PROCEDURE — 82043 UR ALBUMIN QUANTITATIVE: CPT

## 2021-10-06 PROCEDURE — 80061 LIPID PANEL: CPT | Mod: 91

## 2021-10-06 PROCEDURE — 84443 ASSAY THYROID STIM HORMONE: CPT

## 2021-10-06 PROCEDURE — 80061 LIPID PANEL: CPT

## 2021-10-06 PROCEDURE — 82570 ASSAY OF URINE CREATININE: CPT

## 2021-10-06 PROCEDURE — 84439 ASSAY OF FREE THYROXINE: CPT

## 2021-10-06 PROCEDURE — 36415 COLL VENOUS BLD VENIPUNCTURE: CPT | Mod: GA

## 2021-10-13 ENCOUNTER — OFFICE VISIT (OUTPATIENT)
Dept: MEDICAL GROUP | Facility: PHYSICIAN GROUP | Age: 76
End: 2021-10-13
Payer: MEDICARE

## 2021-10-13 ENCOUNTER — HOSPITAL ENCOUNTER (OUTPATIENT)
Facility: MEDICAL CENTER | Age: 76
End: 2021-10-13
Attending: INTERNAL MEDICINE
Payer: MEDICARE

## 2021-10-13 VITALS
RESPIRATION RATE: 16 BRPM | TEMPERATURE: 97.6 F | HEART RATE: 68 BPM | WEIGHT: 204 LBS | DIASTOLIC BLOOD PRESSURE: 68 MMHG | HEIGHT: 70 IN | SYSTOLIC BLOOD PRESSURE: 110 MMHG | OXYGEN SATURATION: 97 % | BODY MASS INDEX: 29.2 KG/M2

## 2021-10-13 DIAGNOSIS — H00.015 HORDEOLUM EXTERNUM OF LEFT LOWER EYELID: ICD-10-CM

## 2021-10-13 DIAGNOSIS — Z79.4 TYPE 2 DIABETES MELLITUS WITH OTHER SPECIFIED COMPLICATION, WITH LONG-TERM CURRENT USE OF INSULIN (HCC): ICD-10-CM

## 2021-10-13 DIAGNOSIS — Z20.828 EXPOSURE TO SARS-ASSOCIATED CORONAVIRUS: ICD-10-CM

## 2021-10-13 DIAGNOSIS — N18.31 STAGE 3A CHRONIC KIDNEY DISEASE: ICD-10-CM

## 2021-10-13 DIAGNOSIS — E11.69 TYPE 2 DIABETES MELLITUS WITH OTHER SPECIFIED COMPLICATION, WITH LONG-TERM CURRENT USE OF INSULIN (HCC): ICD-10-CM

## 2021-10-13 DIAGNOSIS — E11.3293 CONTROLLED TYPE 2 DIABETES MELLITUS WITH BOTH EYES AFFECTED BY MILD NONPROLIFERATIVE RETINOPATHY WITHOUT MACULAR EDEMA, WITHOUT LONG-TERM CURRENT USE OF INSULIN (HCC): ICD-10-CM

## 2021-10-13 DIAGNOSIS — Z23 NEED FOR VACCINATION: ICD-10-CM

## 2021-10-13 LAB
COVID ORDER STATUS COVID19: NORMAL
SARS-COV-2 RNA RESP QL NAA+PROBE: NOTDETECTED
SPECIMEN SOURCE: NORMAL

## 2021-10-13 PROCEDURE — U0003 INFECTIOUS AGENT DETECTION BY NUCLEIC ACID (DNA OR RNA); SEVERE ACUTE RESPIRATORY SYNDROME CORONAVIRUS 2 (SARS-COV-2) (CORONAVIRUS DISEASE [COVID-19]), AMPLIFIED PROBE TECHNIQUE, MAKING USE OF HIGH THROUGHPUT TECHNOLOGIES AS DESCRIBED BY CMS-2020-01-R: HCPCS

## 2021-10-13 PROCEDURE — U0005 INFEC AGEN DETEC AMPLI PROBE: HCPCS

## 2021-10-13 PROCEDURE — G0008 ADMIN INFLUENZA VIRUS VAC: HCPCS | Performed by: INTERNAL MEDICINE

## 2021-10-13 PROCEDURE — 90662 IIV NO PRSV INCREASED AG IM: CPT | Performed by: INTERNAL MEDICINE

## 2021-10-13 PROCEDURE — 99214 OFFICE O/P EST MOD 30 MIN: CPT | Mod: CS,25 | Performed by: INTERNAL MEDICINE

## 2021-10-13 RX ORDER — INSULIN LISPRO 100 [IU]/ML
5 INJECTION, SOLUTION INTRAVENOUS; SUBCUTANEOUS
Qty: 15 ML | Refills: 3 | Status: SHIPPED | OUTPATIENT
Start: 2021-10-13 | End: 2021-11-20

## 2021-10-13 RX ORDER — INSULIN GLARGINE 300 U/ML
28 INJECTION, SOLUTION SUBCUTANEOUS DAILY
COMMUNITY
End: 2021-11-15 | Stop reason: SDUPTHER

## 2021-10-13 ASSESSMENT — FIBROSIS 4 INDEX: FIB4 SCORE: 1.5

## 2021-10-13 NOTE — PROGRESS NOTES
Subjective:   Chief Complaint/History of Present Illness:  Av Bob is a 76 y.o. male established patient who presents today to discuss medical problems as listed below. Av is accompanied by his spouse, Usha.    Problem   Exposure to Sars-Associated Coronavirus    He reports viral illness last week with runny nose, congestion, productive cough, and wheezing.  He went to a play of his grandson and may have been exposed to Covid.  He was feeling very bad last Friday and over the weekend and now is at least 50% better.     Hordeolum Externum of Left Lower Eyelid    He reports to clinic with 3 weeks of erythema and pain of the left lower eyelid. No clear inciting cause. Reports no globe pain. Lower > upper eyelid swelling and erythema. After 1.5 weeks had drainage of purulence from the lower medial eyelid. No photosensitivity. Using warm compresses. No changes in visual acuity. Saw retinal specialist around day 1 or 2 of symptoms who felt it could be related to smoke. History of DM2 on insulin.    On follow-up 1 month later the erythema has nearly resolved surrounding the left eyelid and the swelling is also improved.  No further purulence.  Improved with warm compresses and Polysporin ophthalmologic ointment.     Type 2 Diabetes Mellitus, With Long-Term Current Use of Insulin ([facility])       Ref. Range 10/6/2021 11:38   Glycohemoglobin Latest Ref Range: 4.0 - 5.6 % 7.1 (H)   Estim. Avg Glu Latest Units: mg/dL 157      Ref. Range 10/6/2021 11:38   Micro Alb Creat Ratio Latest Ref Range: 0 - 30 mg/g 42 (H)     Longstanding history of type 2 diabetes on insulin.  Previously followed with endocrinology, Dr. Rasheed.     Current regimen includes Trulicity 3 mg weekly,Toujeo 28 units daily, Humalog 5 units for sugars between 101-150, increase by 2 units if greater than 150.  Correction dosage is 2: 50 greater than 150.    Complicated by retinal involvement, neuropathy, CKDIII, and microalbuminuria.       Stage  3 Chronic Kidney Disease (Hcc)       Ref. Range 10/6/2021 11:38   Bun Latest Ref Range: 8 - 22 mg/dL 27 (H)   Creatinine Latest Ref Range: 0.50 - 1.40 mg/dL 1.44 (H)   GFR If Non  Latest Ref Range: >60 mL/min/1.73 m 2 48 (A)     He has a history of chronic kidney disease related to nephrolithiasis, recurrent UTIs, and BPH as well as history of hypertension and diabetes.  He is following with nephrology, Dr. Jarvis.     Spironolactone was discontinued due to worsening chronic kidney disease.    Other current renally excreted medications include losartan 50 mg daily, potassium chloride 8 mEq daily, and Toujeo/Humalog.    He had recent follow-up with his nephrologist who is very happy with the stability of his renal function.          Current Medications:  Current Outpatient Medications Ordered in Epic   Medication Sig Dispense Refill   • Insulin Glargine, 1 Unit Dial, (TOUJEO SOLOSTAR) 300 UNIT/ML Solution Pen-injector Inject 28 Units under the skin every day.     • insulin lispro (HUMALOG,ADMELOG) 100 UNIT/ML Solution Pen-injector injection PEN Inject 5 Units under the skin 3 times a day before meals. 15 mL 3   • omeprazole (PRILOSEC) 20 MG delayed-release capsule TAKE 1 CAPSULE BY MOUTH EVERY DAY 30 MINUTES BEFORE BREAKFAST MEAL     • TRULICITY 3 MG/0.5ML Solution Pen-injector INJECT 0.5 ML UNDER THE SKIN EVERY 7 DAYS 2 mL 4   • gabapentin (NEURONTIN) 100 MG Cap TAKE 1 TO 3 CAPSULES BY MOUTH AT BEDTIME AS NEEDED FOR PAIN 90 capsule 3   • fluoxetine (PROZAC) 40 MG capsule TAKE 1 CAPSULE BY MOUTH EVERY DAY 90 capsule 3   • metoprolol SR (TOPROL XL) 100 MG TABLET SR 24 HR Take 1 tablet by mouth every day. 90 tablet 3   • losartan (COZAAR) 50 MG Tab Take 1 tablet by mouth 2 times a day. 180 tablet 3   • fenofibrate (TRICOR) 145 MG Tab Take 1 tablet by mouth every day. 90 tablet 3   • clopidogrel (PLAVIX) 75 MG Tab Take 1 tablet by mouth every day. 90 tablet 3   • atorvastatin (LIPITOR) 40 MG Tab Take 1  "tablet by mouth every evening. 90 tablet 3   • amLODIPine (NORVASC) 5 MG Tab Take 1 tablet by mouth every day. 90 tablet 3   • allopurinol (ZYLOPRIM) 100 MG Tab TAKE 1 TABLET BY MOUTH EVERY  tablet 3   • potassium chloride (KLOR-CON) 8 MEQ tablet TAKE 1 TABLET BY MOUTH EVERY DAY 90 tablet 2   • glucose blood strip OneTouch Ultra Blue Test Strip   U TO TEST TID     • Insulin Pen Needle 32 G x 4 mm (BD PEN NEEDLE SWAIT U/F) USE FOR INSULIN SHOTS FIVE TIMES DAILY 500 Each 3   • Multiple Vitamin (MULTI VITAMIN MENS PO) Take  by mouth.     • aspirin EC (ECOTRIN) 81 MG Tablet Delayed Response Take 81 mg by mouth every day.     • Probiotic Product (PROBIOTIC DAILY PO) Take 1 Cap by mouth 2 Times a Day.     • magnesium oxide (MAG-OX) 400 MG Tab Take 400 mg by mouth every day.     • finasteride (PROSCAR) 5 MG Tab Take 1 Tab by mouth every day. 30 Tab 0   • alfuzosin (UROXATRAL) 10 MG SR tablet Take 10 mg by mouth every day.     • methenamine hip (HIPPREX) 1 GM Tab Take 1 g by mouth 2 times a day.     • acetaminophen (TYLENOL) 500 MG Tab Take 1,000 mg by mouth every 6 hours as needed for Mild Pain or Moderate Pain.     • Cholecalciferol (VITAMIN D) 2000 units Cap Take 4,000 Units by mouth 2 Times a Day. Once a day       No current [facility]-ordered facility-administered medications on file.          Objective:   Physical Exam:    Vitals: /68 (BP Location: Left arm, Patient Position: Sitting, BP Cuff Size: Adult)   Pulse 68   Temp 36.4 °C (97.6 °F) (Temporal)   Resp 16   Ht 1.778 m (5' 10\")   Wt 92.5 kg (204 lb)   SpO2 97%    BMI: Body mass index is 29.27 kg/m².  Physical Exam  Constitutional:       General: He is not in acute distress.     Appearance: He is not ill-appearing.      Comments: Left hemiplegia with LUE spasticity.   HENT:      Right Ear: Tympanic membrane and ear canal normal. There is no impacted cerumen.      Left Ear: Tympanic membrane and ear canal normal. There is no impacted cerumen.      Nose: " Nose normal.      Mouth/Throat:      Mouth: Mucous membranes are dry.      Comments: Angular chelitis   Cardiovascular:      Rate and Rhythm: Normal rate.      Pulses: Normal pulses.   Pulmonary:      Effort: Pulmonary effort is normal. No respiratory distress.      Breath sounds: Normal breath sounds. No wheezing or rhonchi.   Skin:     Comments: Improved erythema surrounding left eyelids   Neurological:      Comments: Uses a wheelchair for longer distances   Psychiatric:         Mood and Affect: Mood normal.         Behavior: Behavior normal.         Thought Content: Thought content normal.         Judgment: Judgment normal.          Assessment and Plan:   Av is a 76 y.o. male with the following:  Problem List Items Addressed This Visit     Stage 3 chronic kidney disease (HCC)     Chronic and ongoing problem.  GFR remained stable in the high 40s.  Continue follow-up with nephrology as recommended.  Continue avoidance of nephrotoxic agents as able.  Diabetes under good control.         Type 2 diabetes mellitus, with long-term current use of insulin (HCC)     Chronic and ongoing problem. A1c stable at 7.1, no hypoglycemia. FBG ranges .  Continue Toujeo 28 units daily and Humalog 5 units 3 times daily before meals with additional sliding scale if sugars are elevated, dose correction is 2:50 for blood sugar greater than 150.  Continue follow-up with ophthalmology due to retinal complications from diabetes.  Continue to follow kidney function closely, stable stage III chronic kidney disease with GFR in high 40s to low 50s.  Continue gabapentin for neuropathy, this also assist with hemiplegia related to previous stroke.         Relevant Medications    Insulin Glargine, 1 Unit Dial, (TOUJEO SOLOSTAR) 300 UNIT/ML Solution Pen-injector    insulin lispro (HUMALOG,ADMELOG) 100 UNIT/ML Solution Pen-injector injection PEN    Hordeolum externum of left lower eyelid     Recent problem, improving and nearly resolved.   No longer requires a topical antibiotic. Continue to observe, established with ophthalmology.         Exposure to SARS-associated coronavirus     New problem, exam reassuring.  Potential exposure with breakthrough infection.  Will check him for Covid to ensure stability.         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    I spent a total of 32 minutes with record review, exam, communication with the patient, communication with other providers, and documentation of this encounter.    PLEASE NOTE: This dictation was created using voice recognition software. I have made every reasonable attempt to correct obvious errors, but I expect that there are errors of grammar and possibly content that I did not discover before finalizing the note.      Madison Bunch, DO  Geriatric and Internal Medicine  [facility]

## 2021-10-13 NOTE — ASSESSMENT & PLAN NOTE
Recent problem, improving and nearly resolved.  No longer requires a topical antibiotic. Continue to observe, established with ophthalmology.

## 2021-10-13 NOTE — ASSESSMENT & PLAN NOTE
New problem, exam reassuring.  Potential exposure with breakthrough infection.  Will check him for Covid to ensure stability.

## 2021-10-13 NOTE — ASSESSMENT & PLAN NOTE
Chronic and ongoing problem. A1c stable at 7.1, no hypoglycemia. FBG ranges .  Continue Toujeo 28 units daily and Humalog 5 units 3 times daily before meals with additional sliding scale if sugars are elevated, dose correction is 2:50 for blood sugar greater than 150.  Continue follow-up with ophthalmology due to retinal complications from diabetes.  Continue to follow kidney function closely, stable stage III chronic kidney disease with GFR in high 40s to low 50s.  Continue gabapentin for neuropathy, this also assist with hemiplegia related to previous stroke.

## 2021-10-13 NOTE — ASSESSMENT & PLAN NOTE
Chronic and ongoing problem.  GFR remained stable in the high 40s.  Continue follow-up with nephrology as recommended.  Continue avoidance of nephrotoxic agents as able.  Diabetes under good control.

## 2021-11-09 ENCOUNTER — OFFICE VISIT (OUTPATIENT)
Dept: CARDIOLOGY | Facility: MEDICAL CENTER | Age: 76
End: 2021-11-09
Payer: MEDICARE

## 2021-11-09 VITALS
DIASTOLIC BLOOD PRESSURE: 82 MMHG | OXYGEN SATURATION: 97 % | WEIGHT: 205 LBS | HEIGHT: 70 IN | HEART RATE: 68 BPM | BODY MASS INDEX: 29.35 KG/M2 | SYSTOLIC BLOOD PRESSURE: 120 MMHG | RESPIRATION RATE: 16 BRPM

## 2021-11-09 DIAGNOSIS — I73.9 PVD (PERIPHERAL VASCULAR DISEASE) (HCC): ICD-10-CM

## 2021-11-09 DIAGNOSIS — E11.69 HYPERLIPIDEMIA ASSOCIATED WITH TYPE 2 DIABETES MELLITUS (HCC): ICD-10-CM

## 2021-11-09 DIAGNOSIS — E78.5 HYPERLIPIDEMIA ASSOCIATED WITH TYPE 2 DIABETES MELLITUS (HCC): ICD-10-CM

## 2021-11-09 DIAGNOSIS — D64.9 NORMOCYTIC ANEMIA: ICD-10-CM

## 2021-11-09 DIAGNOSIS — I48.0 PAROXYSMAL ATRIAL FIBRILLATION (HCC): ICD-10-CM

## 2021-11-09 DIAGNOSIS — I10 ESSENTIAL HYPERTENSION: ICD-10-CM

## 2021-11-09 DIAGNOSIS — E11.69 TYPE 2 DIABETES MELLITUS WITH OTHER SPECIFIED COMPLICATION, WITH LONG-TERM CURRENT USE OF INSULIN (HCC): ICD-10-CM

## 2021-11-09 DIAGNOSIS — E87.6 HYPOKALEMIA: ICD-10-CM

## 2021-11-09 DIAGNOSIS — Z99.3 WHEELCHAIR DEPENDENCE: ICD-10-CM

## 2021-11-09 DIAGNOSIS — G47.33 OSA (OBSTRUCTIVE SLEEP APNEA): ICD-10-CM

## 2021-11-09 DIAGNOSIS — N18.31 STAGE 3A CHRONIC KIDNEY DISEASE: ICD-10-CM

## 2021-11-09 DIAGNOSIS — Z79.4 TYPE 2 DIABETES MELLITUS WITH OTHER SPECIFIED COMPLICATION, WITH LONG-TERM CURRENT USE OF INSULIN (HCC): ICD-10-CM

## 2021-11-09 DIAGNOSIS — I10 ESSENTIAL HYPERTENSION, BENIGN: ICD-10-CM

## 2021-11-09 DIAGNOSIS — Z98.61 STATUS POST PERCUTANEOUS TRANSLUMINAL CORONARY ANGIOPLASTY: ICD-10-CM

## 2021-11-09 DIAGNOSIS — I25.10 CORONARY ARTERY DISEASE INVOLVING NATIVE CORONARY ARTERY OF NATIVE HEART WITHOUT ANGINA PECTORIS: ICD-10-CM

## 2021-11-09 DIAGNOSIS — E78.5 DYSLIPIDEMIA: ICD-10-CM

## 2021-11-09 PROCEDURE — 99214 OFFICE O/P EST MOD 30 MIN: CPT | Performed by: INTERNAL MEDICINE

## 2021-11-09 RX ORDER — AMLODIPINE BESYLATE 5 MG/1
5 TABLET ORAL
Qty: 90 TABLET | Refills: 3 | Status: SHIPPED | OUTPATIENT
Start: 2021-11-09 | End: 2022-05-04

## 2021-11-09 RX ORDER — CLOPIDOGREL BISULFATE 75 MG/1
75 TABLET ORAL
Qty: 90 TABLET | Refills: 3 | Status: SHIPPED | OUTPATIENT
Start: 2021-11-09 | End: 2022-06-27

## 2021-11-09 RX ORDER — METOPROLOL SUCCINATE 100 MG/1
100 TABLET, EXTENDED RELEASE ORAL
Qty: 90 TABLET | Refills: 3 | Status: SHIPPED | OUTPATIENT
Start: 2021-11-09 | End: 2022-08-25

## 2021-11-09 RX ORDER — ATORVASTATIN CALCIUM 80 MG/1
80 TABLET, FILM COATED ORAL EVERY EVENING
Qty: 90 TABLET | Refills: 3 | Status: SHIPPED | OUTPATIENT
Start: 2021-11-09 | End: 2022-11-03

## 2021-11-09 RX ORDER — LOSARTAN POTASSIUM 50 MG/1
50 TABLET ORAL 2 TIMES DAILY
Qty: 180 TABLET | Refills: 3 | Status: SHIPPED | OUTPATIENT
Start: 2021-11-09 | End: 2022-08-25

## 2021-11-09 RX ORDER — POTASSIUM CHLORIDE 600 MG/1
8 TABLET, FILM COATED, EXTENDED RELEASE ORAL
Qty: 90 TABLET | Refills: 3 | Status: ON HOLD | OUTPATIENT
Start: 2021-11-09 | End: 2022-05-31

## 2021-11-09 RX ORDER — FENOFIBRATE 145 MG/1
145 TABLET, COATED ORAL
Qty: 90 TABLET | Refills: 3 | Status: SHIPPED | OUTPATIENT
Start: 2021-11-09 | End: 2022-11-03

## 2021-11-09 ASSESSMENT — FIBROSIS 4 INDEX: FIB4 SCORE: 1.5

## 2021-11-09 NOTE — PROGRESS NOTES
Cardiology Follow-up Consultation Note    Date of note: 11/9/2021  Primary Care Provider: Mitchell Pantoja M.D.  Referring Provider: Leonardo.     Patient Name: Av Bob   YOB: 1945  MRN:              [id number]    Chief Complaint: CAD    History of Present Illness: Av Bob is a 76 y.o. male whose current medical problems include CKD stage III, hypertension, CAD  (GIOVANNA to RCA in '97, GIOVANNA X2 to LAD and GIOVANNA X to OM in '09), atrial fibrillation, diabetes, dyslipidemia, gout, CVA 12/2016,  non-hodgkins lymphoma c/b brain lesions with resultant left hemiparesis s/p chemotherapy and depression who is here for follow-up.     At our visit, 4/3/2018:  In terms of his CVA, this was in 2016, and while he was on aspirin and plavix at [facility].  This was in the setting of active lymphoma.    He was also admitted to [facility] again in 5/2017 for sepsis and was noted to have atrial fibrillation at this time. He has no noted recurrence and no episodes before that.  He has never been on anticoagulation for Cva prevention.     In terms of his NHL, he sees Dr. Noel, and his last PET scan showed he was cancer free.     Right leg wounds, currently healing. Evaluated by Dr. Terry for bilateral tibial artery stenosis, and decided against surgery at this time.     At our visit, 10/9/2018:  Started xarelto and had episodes of melena.  Switched back to plavix.  FOBT was negative.     At our visit, 4/16/2019:  Admitted 11/21/2018 for weakness, negative cerebrovascular work-up.     At our visit, 10/8/2019:  In terms of cancer, no e/o recurrence.     Still works with  an hour three times a week, no symptoms.    Was admitted for UTI this summer.     Did have peripheral angiogram, noted stenosis but no intervention performed. At [facility].     At our visit, 5/6/2020:  Cholesterol was poorly controlled, I increased his lipitor.     Hospitalized 1/21/2020 for  hematuria and UTI requiring bladder irrigation.     Fatigued possibly improved on lower dose of metoprolol.     At our visit, 11/17/2020:  Still with leg cramping on occasion.     At our visit, 5/19/2021:  Diabetes worsening.     Interim Events:  In terms of Cad, no angina.     In terms of PSVT, no palpitations.     Hypertension well controlled usually in the 120s.       Review of Systems   easy bruising.     Constitution: Negative for chills, fever and night sweats.   HENT: Negative for nosebleeds.    Eyes: Negative for vision loss in left eye and vision loss in right eye.   Respiratory: Negative for hemoptysis.    Gastrointestinal: Negative for hematemesis, hematochezia and melena.   Genitourinary: Negative for hematuria.   Neurological: Negative for focal weakness, numbness and paresthesias.     All other systems reviewed and discussed using a comprehensive questionnaire and are negative.       Past Medical History:   Diagnosis Date   • ([facility]) Chronic ulcer of right lower extremity with fat layer exposed 12/27/2018   • Acute on chronic renal failure ([facility]) 1/21/2020   • Acute respiratory failure with hypoxia ([facility]) 5/20/2017   • CAD (coronary artery disease)     GIOVANNA to RCA in '97, GIOVANNA X2 to LAD and GIOVANNA X2 to OM in '09   • Cancer ([facility])     2017; chemo lympoma non hodgkins lymphoma   • Cataract     dez iol   • Cerebrovascular accident (CVA) ([facility]) 12/30/2016    Left arm weakness  etiology of stroke not established, lymphoma discovered on MRI evaluation of stroke, L hemiparesis much worse after acute infectious illness in mid 2017, but no specific diagnosed recurrent neurological etiology, all at [facility]   • Chickenpox    • CKD (chronic kidney disease) stage 3, GFR 30-59 ml/min ([facility])    • Controlled gout 2014   • Coronary atherosclerosis of native coronary artery     S/P PTCA (percutaneous transluminal coronary angioplasty), RCA, 5/1997, patent on cath 7/10/2009 at the time of  interventions on his left anterior descending and circumflex coronary arteries   • Daytime sleepiness    • Depression    • Diabetes ([facility])    • Difficulty swallowing    • Enterococcal septicemia ([facility]) 8/12/2017   • Roberto hematuria 1/29/2020   • Frequent urination    • GERD (gastroesophageal reflux disease)    • Amharic measles    • Hypertension 06/30/2021    pt states well controlled on meds   • Hypokalemia 2012    controlled with combination of ACE inhibitor or ARB plus spironolactone   • Hypomagnesemia 08/12/2017    etiology uncertain   • Influenza A 1/26/2020   • Insomnia    • Kidney stone    • Leg edema, right 1/22/2020   • Lymphoma ([facility]) 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 Barry   • Normocytic hypochromic anemia 5/20/2017   • NSTEMI (non-ST elevated myocardial infarction) ([facility]) 07/18/2017    complicating UTI with sepsis   • Pain 06/30/2021    right leg foot   • Peripheral vascular disease ([facility])    • Polyneuropathy in diabetes(357.2) 9/11/2013   • Renal mass 1/21/2020   • Septic shock ([facility]) 5/20/2017   • Skin ulcer of calf ([facility]) 2015    Right, Dr. Terry and wound care   • Stroke ([facility]) 2016    left sided weakness   • Urinary bladder disorder    • Urinary incontinence     pt wears pads   • Weakness    • Wound of left leg 2012    Requiring surgery and debridment, Dr. Moore         Past Surgical History:   Procedure Laterality Date   • OR INJ LUMBAR/SACRAL,W/ IMAGING  7/27/2021    Procedure: Lumbar L5-S1 interlaminar epidural steroid injection;  Surgeon: Harpal Bennett M.D.;  Location: [facility];  Service: Pain Management   • PB UPPER GI ENDOSCOPY,DIAGNOSIS N/A 7/21/2021    Procedure: GASTROSCOPY - AND DILATION;  Surgeon: Neville Huerta M.D.;  Location: SURGERY SAME DAY [facility];  Service: Gastroenterology   • PB UPPER GI ENDOSCOPY,BIOPSY N/A 7/21/2021    Procedure: GASTROSCOPY, WITH BIOPSY;  Surgeon: Neville LOYA  ADRIANA Hureta;  Location: SURGERY SAME DAY [facility];  Service: Gastroenterology   • LUMBAR TRANSFORAMINAL EPIDURAL STEROID INJECTION Right 3/29/2021    Procedure: RIGHT L3-4 and L4-5 transforaminal epidural steroid injection with fluoroscopic guidance;  Surgeon: Harpal Bennett M.D.;  Location: [facility];  Service: Pain Management   • LUMBAR TRANSFORAMINAL EPIDURAL STEROID INJECTION Right 11/2/2020    Procedure: INJECTION, STEROID, SPINE, LUMBAR, EPIDURAL, TRANSFORAMINAL APPROACH;  Surgeon: Harpal Bennett M.D.;  Location: [facility]ERY [facility];  Service: Pain Management   • CYSTOSCOPY STENT PLACEMENT Left 2/12/2018    Procedure: CYSTOSCOPY  ;  Surgeon: Rey Barry M.D.;  Location: SURGERY [facility];  Service: Urology   • URETEROSCOPY Left 2/12/2018    Procedure: URETEROSCOPY- FLEXIBLE  ;  Surgeon: Rey Barry M.D.;  Location: SURGERY [facility];  Service: Urology   • LASERTRIPSY Left 2/12/2018    Procedure: LASERTRIPSY - LITHO  ;  Surgeon: Rey Barry M.D.;  Location: SURGERY [facility];  Service: Urology   • WOUND CLOSURE GENERAL  4/3/2012    Performed by GERHARD GILBERT at SURGERY SAME DAY [facility] ORS   • ZZZ CARDIAC CATH  2009    Stents to LAD, Om   • DAGOBERTO BY LAPAROSCOPY  1998   • ANGIOPLASTY  1997    RCA followed by other stents as noted above.    • ZZZ CARDIAC CATH  1997    stent RCA   • CATARACT EXTRACTION WITH IOL      bilateral   • LITHOTRIPSY     • TONSILLECTOMY     • TONSILLECTOMY AND ADENOIDECTOMY           Current Outpatient Medications   Medication Sig Dispense Refill   • Insulin Pen Needle 32 G x 4 mm (BD PEN NEEDLE SWATI U/F) USE FOR INSULIN SHOTS FIVE TIMES DAILY 500 Each 3   • Insulin Glargine, 1 Unit Dial, (TOUJEO SOLOSTAR) 300 UNIT/ML Solution Pen-injector Inject 28 Units under the skin every day.     • insulin lispro (HUMALOG,ADMELOG) 100 UNIT/ML Solution Pen-injector injection PEN Inject 5 Units under the skin 3 times a day before meals. 15  mL 3   • omeprazole (PRILOSEC) 20 MG delayed-release capsule TAKE 1 CAPSULE BY MOUTH EVERY DAY 30 MINUTES BEFORE BREAKFAST MEAL     • TRULICITY 3 MG/0.5ML Solution Pen-injector INJECT 0.5 ML UNDER THE SKIN EVERY 7 DAYS 2 mL 4   • gabapentin (NEURONTIN) 100 MG Cap TAKE 1 TO 3 CAPSULES BY MOUTH AT BEDTIME AS NEEDED FOR PAIN 90 capsule 3   • fluoxetine (PROZAC) 40 MG capsule TAKE 1 CAPSULE BY MOUTH EVERY DAY 90 capsule 3   • metoprolol SR (TOPROL XL) 100 MG TABLET SR 24 HR Take 1 tablet by mouth every day. 90 tablet 3   • losartan (COZAAR) 50 MG Tab Take 1 tablet by mouth 2 times a day. 180 tablet 3   • fenofibrate (TRICOR) 145 MG Tab Take 1 tablet by mouth every day. 90 tablet 3   • clopidogrel (PLAVIX) 75 MG Tab Take 1 tablet by mouth every day. 90 tablet 3   • atorvastatin (LIPITOR) 40 MG Tab Take 1 tablet by mouth every evening. 90 tablet 3   • amLODIPine (NORVASC) 5 MG Tab Take 1 tablet by mouth every day. 90 tablet 3   • allopurinol (ZYLOPRIM) 100 MG Tab TAKE 1 TABLET BY MOUTH EVERY  tablet 3   • potassium chloride (KLOR-CON) 8 MEQ tablet TAKE 1 TABLET BY MOUTH EVERY DAY 90 tablet 2   • glucose blood strip OneTouch Ultra Blue Test Strip   U TO TEST TID     • Multiple Vitamin (MULTI VITAMIN MENS PO) Take  by mouth.     • aspirin EC (ECOTRIN) 81 MG Tablet Delayed Response Take 81 mg by mouth every day.     • Probiotic Product (PROBIOTIC DAILY PO) Take 1 Cap by mouth 2 Times a Day.     • magnesium oxide (MAG-OX) 400 MG Tab Take 400 mg by mouth every day.     • finasteride (PROSCAR) 5 MG Tab Take 1 Tab by mouth every day. 30 Tab 0   • alfuzosin (UROXATRAL) 10 MG SR tablet Take 10 mg by mouth every day.     • methenamine hip (HIPPREX) 1 GM Tab Take 1 g by mouth 2 times a day.     • acetaminophen (TYLENOL) 500 MG Tab Take 1,000 mg by mouth every 6 hours as needed for Mild Pain or Moderate Pain.     • Cholecalciferol (VITAMIN D) 2000 units Cap Take 4,000 Units by mouth 2 Times a Day. Once a day       No current  facility-administered medications for this visit.         Allergies   Allergen Reactions   • Diphenhydramine Hcl Anxiety     Pt is able to tolerate  Mg benadryl with less anxiety   • Lorazepam Unspecified     Disorientation   • Ciprofloxacin      Rash,stomach ache   • Spironolactone      Acute kidney injury         Family History   Problem Relation Age of Onset   • Heart Disease Father         CAD   • Diabetes Father    • Cancer Mother    • Psychiatric Illness Mother         Depression   • Depression Mother    • Kidney stones Brother    • Heart Disease Brother    • Psychiatric Illness Brother         Depression   • Depression Brother    • Suicide Attempts Other    • Psychiatric Illness Other         autism         Social History     Socioeconomic History   • Marital status:      Spouse name: Not on file   • Number of children: Not on file   • Years of education: Not on file   • Highest education level: Not on file   Occupational History   • Not on file   Tobacco Use   • Smoking status: Never Smoker   • Smokeless tobacco: Never Used   • Tobacco comment: continued abstinence   Vaping Use   • Vaping Use: Never used   Substance and Sexual Activity   • Alcohol use: Never   • Drug use: Never   • Sexual activity: Not Currently     Partners: Female     Comment: , one daughter, 2 grands   Other Topics Concern   •  Service Yes   • Blood Transfusions No   • Caffeine Concern No   • Occupational Exposure No   • Hobby Hazards No   • Sleep Concern Yes   • Stress Concern No   • Weight Concern No   • Special Diet No   • Back Care No   • Exercise Yes   • Bike Helmet No   • Seat Belt Yes   • Self-Exams Yes   Social History Narrative    Av is from [place], [place] and raised in [place]. He has been in [place] since 2004. 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. He retired in his early 60's.  He has been  to Usha since 2003, they met through a mutual friend. He has a daughter (Kalina) and a step son (Jimi).     Social Determinants of Health     Financial Resource Strain:    • Difficulty of Paying Living Expenses: Not on file   Food Insecurity:    • Worried About Running Out of Food in the Last Year: Not on file   • Ran Out of Food in the Last Year: Not on file   Transportation Needs:    • Lack of Transportation (Medical): Not on file   • Lack of Transportation (Non-Medical): Not on file   Physical Activity:    • Days of Exercise per Week: Not on file   • Minutes of Exercise per Session: Not on file   Stress:    • Feeling of Stress : Not on file   Social Connections:    • Frequency of Communication with Friends and Family: Not on file   • Frequency of Social Gatherings with Friends and Family: Not on file   • Attends Sikhism Services: Not on file   • Active Member of Clubs or Organizations: Not on file   • Attends Club or Organization Meetings: Not on file   • Marital Status: Not on file   Intimate Partner Violence:    • Fear of Current or Ex-Partner: Not on file   • Emotionally Abused: Not on file   • Physically Abused: Not on file   • Sexually Abused: Not on file   Housing Stability:    • Unable to Pay for Housing in the Last Year: Not on file   • Number of Places Lived in the Last Year: Not on file   • Unstable Housing in the Last Year: Not on file          Physical Exam:  Ambulatory Vitals  /82 (BP Location: Left arm, Patient Position: Sitting, BP Cuff Size: Adult)   Pulse 68   Resp 16   Ht 1.778 m (5' 10\")   Wt 93 kg (205 lb)   SpO2 97%    Oxygen Therapy:  Pulse Oximetry: 97 %  BP Readings from Last 4 Encounters:   11/09/21 120/82   10/13/21 110/68   09/30/21 122/78   09/14/21 106/64       Weight/BMI: Body mass index is 29.41 kg/m².  Wt Readings from Last 4 Encounters:   11/09/21 93 kg (205 " lb)   10/13/21 92.5 kg (204 lb)   09/30/21 93 kg (205 lb)   09/14/21 93 kg (205 lb)         General: No apparent distress  Eyes: nl conjunctiva  ENT: OP covered by mask,   Neck: JVP <8 cm H2O, no carotid bruits  Lungs: normal respiratory effort, CTAB  Heart: RRR, no murmurs, no rubs or gallops, trace edema bilateral lower extremities. No LV/RV heave on cardiac palpatation. 2+ bilateral radial pulses.   Abdomen: soft, non tender, non distended, no masses, normal bowel sounds.  No HSM.  Extremities/MSK: no clubbing, no cyanosis  Neurological: ZHANG  Psychiatric: Appropriate affect, A/O x 3, intact judgement and insight  Skin: Warm extremities    Exam repeated in full and unchanged except for as noted above.          Lab Data Review:  Lab Results   Component Value Date/Time    CHOLSTRLTOT 135 10/06/2021 11:38 AM    LDL 87 10/06/2021 11:38 AM    HDL 27 (A) 10/06/2021 11:38 AM    TRIGLYCERIDE 106 10/06/2021 11:38 AM       Lab Results   Component Value Date/Time    SODIUM 143 10/06/2021 11:38 AM    POTASSIUM 3.8 10/06/2021 11:38 AM    CHLORIDE 107 10/06/2021 11:38 AM    CO2 24 10/06/2021 11:38 AM    GLUCOSE 93 10/06/2021 11:38 AM    BUN 27 (H) 10/06/2021 11:38 AM    CREATININE 1.44 (H) 10/06/2021 11:38 AM    CREATININE 1.4 05/28/2008 05:42 PM    BUNCREATRAT 10 03/27/2017 02:11 PM     Lab Results   Component Value Date/Time    ALKPHOSPHAT 60 10/06/2021 11:38 AM    ASTSGOT 24 10/06/2021 11:38 AM    ALTSGPT 16 10/06/2021 11:38 AM    TBILIRUBIN 0.7 10/06/2021 11:38 AM      Lab Results   Component Value Date/Time    WBC 8.1 02/25/2021 11:25 AM     No components found for: HBGA1C  No components found for: TROPONIN  No results found for: BNP      Cardiac Imaging and Procedures Review:    EKG dated 1/26/2020: personally reviewed and showed NSR, artifact, IVCD, late precordial R/S transitoin and prolonged QTc interval.     Echo dated 11/21/2018:  CONCLUSIONS  Prior echocardiogram 10/5/2017 - no noted changes.  The effusion is    new.  Left ventricular ejection fraction is visually estimated to be 60%.  Normal inferior vena cava size and inspiratory collapse.  Trivial to small pericardial effusion noted without evidence of   hemodynamic compromise.  No significant valve disease or flow abnormalities.     Holter, 5/30/2018:    CONCLUSIONS:  * paroxysmal supraventricular tachycardia.  15 episodes over 13 days.  Longest episode 17 seconds  * No atrial fibrillation  * No significant ectopy  * No arrhythmias or ectopy during symptoms      Nuclear Perfusion Imaging (1/2018):   Myocardial Perfusion   Report   NUCLEAR IMAGING INTERPRETATION   The LV is mildly dilated with global hypokinesis.  Calculated LV EF is 49%.     There is a small region of decreased perfusion in the inferior-lateral wall    with a mild amount of inducible ischemia.   ECG INTERPRETATION   Negative stress ECG for ischemia.      Stress test 6/2017 ([facility])    Findings:   Small in size moderate in intensity fixed apical defect. Computer analysis also suggests mild in intensity small in size diminished counts along inferior wall of left ventricle which appears to improve at time of rest. Summed stress score is   7. Summed rest score is 1. Gross hypomotility of the left ventricle is noted with disordered contraction evident. Global hypokinesis is noted.      [facility] (2009): at [facility], last stent.     Radiology test Review:  CXR:1/26/2020:  IMPRESSION:     Stable chest without acute/new abnormality.    Vascular US 2/2018:  Vascular Laboratory   Conclusions   There is no evidence of arterial disease demonstrated (PADMINI is .9-1.0).    Absent flow within the Left VIVIENNE.     Vascular Laboratory   CONCLUSIONS   Right Lower Extremity-   Normal flow from the common femoral artery extending distally to the    popliteal artery. Flow within the posterior tibial artery is brisk and    multiphasic.   Occlusion of the anterior tibial artery at the prox-mid level with    reconstitution  of flow seen at the distal level.     Left Lower Extremity-   Normal flow seen from the common femoral artery extending distally to the    popliteal artery.   Area of elevated velocities seen within the mid segment of the posterior    tibial artery. Consistent with >50% stenosis.   Occlusion of the anterior tibial artery at the mid-distal segment, minimal    flow reversal seen at the level of the ankle.      MRI 12/31/2016  7 mm acute/early subacute infarct is present in the right anterolateral medulla.  There is no significant mass effect or hemorrhage.  No other recent infarction.    12/31/2016:  Three sites of abnormal parenchymal enhancement are present.  *  At the previously seen right anterior pontine lesion, there is a 5 x 3 x 7 mm elongated enhancing peripheral pontine lesion.  *  There is a second 2 mm enhancing focus also in the right anterior david.  *  There is a third 3 mm lesion at the left globus pallidus internus.    These findings may represent metastatic disease.  The elongated morphology of the anterior peripheral pontine lesion is somewhat atypical for metastatic lesion, and could potentially represent enhancement of a subacute infarct.  Two other lesions could also, in theory, represent subacute enhancing reperfusion of lacunar infarctions. Anecdotally, this would be an unusual finding in lacunar infarctions. Note that post infarct enhancement typically occurs approximately 1 week after infarction and resolves within 12 weeks or less.       Head CTA 12/30/2016:  1. There are plaque formations identified in both carotid bifurcations   (right greater than left). This causes approximately 50% stenosis on the   right and 10-20% stenosis on the left in the proximal internal carotid   arteries. The remainder of the carotid   vasculature is unremarkable.  2. The vertebral arteries are within normal limits.  3. No dissection.  4. A 5.5 mm left thyroid lobe cyst/nodule is present.  5. A 10 x 13 mm  partially calcified pulmonary nodule is present in the   left upper lobe. There may be some internal fat density. Findings could   represent a benign pulmonary hamartoma. Recommend clinical correlation.  6. Moderate to advanced degenerative changes are present throughout the   cervical spine.      Medical Decision Makin. Atherosclerosis of native coronary artery of native heart without angina pectoris  S/p 5 stents, last in . Mild ischemia on two recent nuclear perfusion scans    Currently without recurrent chest pain (had some atypical chest pain ) so will treat medically.  -continue aspirin/lipitor  -fasting blood tests ordered for next labs, lipid panel. Goal LDL <70.      2. 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 Rasheed, now well controlled, last hgbA1c <7    3. Essential hypertension, benign  Well controlled.   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   -Continue losartan 50mg PO bid  -continue norvasc 5mg PO Daily.  -continue metoprolol XL 100mg PO Daily    4. Paroxysmal atrial fibrillation (CMS-HCC)  Self limited and one time in setting of sepsis. Previously discussed at length risks of recurrent CVA given his previous likely embolic event, and if he would like an ILR to confirm diagnosis or to start empiric anticoagulation.  He prefers the later and we started xarelto 2018.  Unfortunately he had melena almost immediately, and was switched back to aspirin/plavix.  He does not want to retrial anticoagulation, or a GI referral as he sees enough physicians already.   -continue aspirin/plavix. No anticoag for now given patient preference despite risk of recurrent CVA.    -continue metoprolol XL 100mg PO Daily, dose decreased from prior due to fatigue.     5. CKD (chronic kidney disease) stage 3, GFR 30-59 ml/min  Stable.  -avoid spironolactone  -continue  losartan  -followed by Dr. Jarvis.     6. Diffuse large cell non-Hodgkin's lymphoma (CMS-HCC)  Followed by Dr. Noel, in remission per report with recent negative PET scan.    7. Left hemiparesis (CMS-HCC)  Acute 12/2016 at [facility].  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  Symptoms originally improved significantly with chemotherapy and he was walking well, and then hemiparesis worsened significantly after sepsis from UTI and norovirus infection mid 2017.  Currently continuing physical therapy, but remains in a wheelchair.     8. Cerebrovascular accident (CVA) due to embolism of cerebral artery (CMS-HCC)  In addition to cerebral enhancing lesions thought to be lymphoma on MRI 12/2016, he was found to have a subacute infarct in the right anterolateral medulla.    -aspirin/plavix for CVA prevention    9. Dyslipidemia  Lipitor, fenofibrate. Increase lipitor back to 80mg PO daily.  -recheck lipid panel in 2 months or so.     10. Peripheral vascular disease (CMS-HCC)  Followed by Dr. Terry previously but now followed by Dr. Mohsen at [facility]. No recent intervention. Contributing to non-healing wounds.   -continue aspirin/plavix     11. Goals of care - this is completed, they have scanned in a card with the number to cloud access to their documents. We did discuss his prognosis during a code previously and their previous code status and AD discussions in the past. He stated he would like to be full code despite his medical comorbidities. We filled out a POLST stating such and will continue our conversations about this in the future.         Return in about 6 months (around 5/9/2022).       Osvaldo Tucker MD  [facility]  [facility], [address]  [address] [address]  Phone: [phone]  Fax: [phone]

## 2021-11-09 NOTE — PATIENT INSTRUCTIONS
Please increase lipitor to 80mg once a day.     Please get fasting blood tests in 2 or more months.

## 2021-11-17 ENCOUNTER — APPOINTMENT (RX ONLY)
Dept: URBAN - METROPOLITAN AREA CLINIC 35 | Facility: CLINIC | Age: 76
Setting detail: DERMATOLOGY
End: 2021-11-17

## 2021-11-17 DIAGNOSIS — Z85.828 PERSONAL HISTORY OF OTHER MALIGNANT NEOPLASM OF SKIN: ICD-10-CM

## 2021-11-17 DIAGNOSIS — L57.0 ACTINIC KERATOSIS: ICD-10-CM

## 2021-11-17 DIAGNOSIS — L81.4 OTHER MELANIN HYPERPIGMENTATION: ICD-10-CM

## 2021-11-17 DIAGNOSIS — L20.89 OTHER ATOPIC DERMATITIS: ICD-10-CM

## 2021-11-17 DIAGNOSIS — Z71.89 OTHER SPECIFIED COUNSELING: ICD-10-CM

## 2021-11-17 DIAGNOSIS — Z87.2 PERSONAL HISTORY OF DISEASES OF THE SKIN AND SUBCUTANEOUS TISSUE: ICD-10-CM

## 2021-11-17 DIAGNOSIS — L82.1 OTHER SEBORRHEIC KERATOSIS: ICD-10-CM

## 2021-11-17 DIAGNOSIS — D22 MELANOCYTIC NEVI: ICD-10-CM

## 2021-11-17 PROBLEM — D22.5 MELANOCYTIC NEVI OF TRUNK: Status: ACTIVE | Noted: 2021-11-17

## 2021-11-17 PROBLEM — D23.5 OTHER BENIGN NEOPLASM OF SKIN OF TRUNK: Status: ACTIVE | Noted: 2021-11-17

## 2021-11-17 PROBLEM — L20.84 INTRINSIC (ALLERGIC) ECZEMA: Status: ACTIVE | Noted: 2021-11-17

## 2021-11-17 PROCEDURE — 99213 OFFICE O/P EST LOW 20 MIN: CPT | Mod: 25

## 2021-11-17 PROCEDURE — ? PRESCRIPTION

## 2021-11-17 PROCEDURE — ? LIQUID NITROGEN

## 2021-11-17 PROCEDURE — 17003 DESTRUCT PREMALG LES 2-14: CPT

## 2021-11-17 PROCEDURE — ? COUNSELING

## 2021-11-17 PROCEDURE — 17000 DESTRUCT PREMALG LESION: CPT

## 2021-11-17 RX ORDER — FLUOROURACIL 5 MG/G
CREAM TOPICAL BID
Qty: 40 | Refills: 1 | Status: ERX | COMMUNITY
Start: 2021-11-17

## 2021-11-17 RX ORDER — CALCIPOTRIENE 0.05 MG/G
THIN LAYER CREAM TOPICAL BID
Qty: 60 | Refills: 1 | Status: ERX

## 2021-11-17 RX ADMIN — FLUOROURACIL THIN LAYER: 5 CREAM TOPICAL at 00:00

## 2021-11-17 ASSESSMENT — LOCATION DETAILED DESCRIPTION DERM
LOCATION DETAILED: MID-FRONTAL SCALP
LOCATION DETAILED: LEFT ANTIHELIX
LOCATION DETAILED: LEFT POSTERIOR SHOULDER
LOCATION DETAILED: LEFT CENTRAL MANDIBULAR CHEEK
LOCATION DETAILED: PERIUMBILICAL SKIN
LOCATION DETAILED: LEFT SUPERIOR HELIX
LOCATION DETAILED: LEFT DISTAL DORSAL FOREARM
LOCATION DETAILED: RIGHT POSTERIOR SHOULDER
LOCATION DETAILED: RIGHT MEDIAL FRONTAL SCALP
LOCATION DETAILED: RIGHT PROXIMAL DORSAL FOREARM
LOCATION DETAILED: RIGHT INFERIOR UPPER BACK
LOCATION DETAILED: LEFT PROXIMAL DORSAL FOREARM
LOCATION DETAILED: RIGHT RADIAL DORSAL HAND
LOCATION DETAILED: RIGHT MEDIAL UPPER BACK
LOCATION DETAILED: LEFT DISTAL RADIAL DORSAL FOREARM
LOCATION DETAILED: RIGHT DISTAL DORSAL FOREARM
LOCATION DETAILED: RIGHT SUPERIOR PARIETAL SCALP
LOCATION DETAILED: LEFT CENTRAL FRONTAL SCALP

## 2021-11-17 ASSESSMENT — LOCATION ZONE DERM
LOCATION ZONE: EAR
LOCATION ZONE: SCALP
LOCATION ZONE: HAND
LOCATION ZONE: TRUNK
LOCATION ZONE: FACE
LOCATION ZONE: ARM

## 2021-11-17 ASSESSMENT — LOCATION SIMPLE DESCRIPTION DERM
LOCATION SIMPLE: RIGHT SCALP
LOCATION SIMPLE: RIGHT SHOULDER
LOCATION SIMPLE: RIGHT UPPER BACK
LOCATION SIMPLE: RIGHT HAND
LOCATION SIMPLE: LEFT EAR
LOCATION SIMPLE: LEFT SHOULDER
LOCATION SIMPLE: LEFT CHEEK
LOCATION SIMPLE: LEFT SCALP
LOCATION SIMPLE: ABDOMEN
LOCATION SIMPLE: RIGHT FOREARM
LOCATION SIMPLE: LEFT FOREARM
LOCATION SIMPLE: ANTERIOR SCALP
LOCATION SIMPLE: SCALP

## 2021-11-17 NOTE — PROCEDURE: LIQUID NITROGEN
Duration Of Freeze Thaw-Cycle (Seconds): 10
Post-Care Instructions: I reviewed with the patient in detail post-care instructions. Patient is to wear sunprotection, and avoid picking at any of the treated lesions. Pt may apply Vaseline to crusted or scabbing areas.
Number Of Freeze-Thaw Cycles: 1 freeze-thaw cycle
Render Note In Bullet Format When Appropriate: No
Show Aperture Variable?: Yes
Detail Level: Detailed
Consent: The patient's consent was obtained including but not limited to risks of crusting, scabbing, blistering, scarring, darker or lighter pigmentary change, recurrence, incomplete removal and infection.

## 2021-11-20 ENCOUNTER — HOSPITAL ENCOUNTER (EMERGENCY)
Facility: MEDICAL CENTER | Age: 76
End: 2021-11-20
Attending: EMERGENCY MEDICINE
Payer: MEDICARE

## 2021-11-20 ENCOUNTER — APPOINTMENT (OUTPATIENT)
Dept: RADIOLOGY | Facility: MEDICAL CENTER | Age: 76
End: 2021-11-20
Attending: EMERGENCY MEDICINE
Payer: MEDICARE

## 2021-11-20 VITALS
WEIGHT: 205.03 LBS | RESPIRATION RATE: 16 BRPM | DIASTOLIC BLOOD PRESSURE: 89 MMHG | HEIGHT: 70 IN | TEMPERATURE: 98.3 F | HEART RATE: 81 BPM | BODY MASS INDEX: 29.35 KG/M2 | OXYGEN SATURATION: 96 % | SYSTOLIC BLOOD PRESSURE: 140 MMHG

## 2021-11-20 DIAGNOSIS — S01.81XA FACIAL LACERATION, INITIAL ENCOUNTER: ICD-10-CM

## 2021-11-20 DIAGNOSIS — S09.90XA CLOSED HEAD INJURY, INITIAL ENCOUNTER: ICD-10-CM

## 2021-11-20 DIAGNOSIS — N30.00 ACUTE CYSTITIS WITHOUT HEMATURIA: ICD-10-CM

## 2021-11-20 LAB
ALBUMIN SERPL BCP-MCNC: 3.6 G/DL (ref 3.2–4.9)
ALBUMIN/GLOB SERPL: 1.8 G/DL
ALP SERPL-CCNC: 62 U/L (ref 30–99)
ALT SERPL-CCNC: 22 U/L (ref 2–50)
ANION GAP SERPL CALC-SCNC: 11 MMOL/L (ref 7–16)
APPEARANCE UR: ABNORMAL
AST SERPL-CCNC: 22 U/L (ref 12–45)
BACTERIA #/AREA URNS HPF: ABNORMAL /HPF
BASOPHILS # BLD AUTO: 0.4 % (ref 0–1.8)
BASOPHILS # BLD: 0.04 K/UL (ref 0–0.12)
BILIRUB SERPL-MCNC: 0.9 MG/DL (ref 0.1–1.5)
BILIRUB UR QL STRIP.AUTO: NEGATIVE
BUN SERPL-MCNC: 32 MG/DL (ref 8–22)
CALCIUM SERPL-MCNC: 8.9 MG/DL (ref 8.4–10.2)
CHLORIDE SERPL-SCNC: 104 MMOL/L (ref 96–112)
CO2 SERPL-SCNC: 23 MMOL/L (ref 20–33)
COLOR UR: YELLOW
CREAT SERPL-MCNC: 1.87 MG/DL (ref 0.5–1.4)
EOSINOPHIL # BLD AUTO: 0.09 K/UL (ref 0–0.51)
EOSINOPHIL NFR BLD: 0.9 % (ref 0–6.9)
ERYTHROCYTE [DISTWIDTH] IN BLOOD BY AUTOMATED COUNT: 46.8 FL (ref 35.9–50)
GLOBULIN SER CALC-MCNC: 2 G/DL (ref 1.9–3.5)
GLUCOSE SERPL-MCNC: 139 MG/DL (ref 65–99)
GLUCOSE UR STRIP.AUTO-MCNC: NEGATIVE MG/DL
HCT VFR BLD AUTO: 41.2 % (ref 42–52)
HGB BLD-MCNC: 13.7 G/DL (ref 14–18)
IMM GRANULOCYTES # BLD AUTO: 0.06 K/UL (ref 0–0.11)
IMM GRANULOCYTES NFR BLD AUTO: 0.6 % (ref 0–0.9)
KETONES UR STRIP.AUTO-MCNC: NEGATIVE MG/DL
LEUKOCYTE ESTERASE UR QL STRIP.AUTO: ABNORMAL
LYMPHOCYTES # BLD AUTO: 0.77 K/UL (ref 1–4.8)
LYMPHOCYTES NFR BLD: 7.3 % (ref 22–41)
MCH RBC QN AUTO: 29 PG (ref 27–33)
MCHC RBC AUTO-ENTMCNC: 33.3 G/DL (ref 33.7–35.3)
MCV RBC AUTO: 87.3 FL (ref 81.4–97.8)
MICRO URNS: ABNORMAL
MONOCYTES # BLD AUTO: 0.67 K/UL (ref 0–0.85)
MONOCYTES NFR BLD AUTO: 6.4 % (ref 0–13.4)
NEUTROPHILS # BLD AUTO: 8.85 K/UL (ref 1.82–7.42)
NEUTROPHILS NFR BLD: 84.4 % (ref 44–72)
NITRITE UR QL STRIP.AUTO: POSITIVE
NRBC # BLD AUTO: 0 K/UL
NRBC BLD-RTO: 0 /100 WBC
PH UR STRIP.AUTO: 8.5 [PH] (ref 5–8)
PLATELET # BLD AUTO: 278 K/UL (ref 164–446)
PMV BLD AUTO: 10.1 FL (ref 9–12.9)
POTASSIUM SERPL-SCNC: 4.1 MMOL/L (ref 3.6–5.5)
PROT SERPL-MCNC: 5.6 G/DL (ref 6–8.2)
PROT UR QL STRIP: 30 MG/DL
RBC # BLD AUTO: 4.72 M/UL (ref 4.7–6.1)
RBC # URNS HPF: ABNORMAL /HPF
RBC UR QL AUTO: ABNORMAL
SODIUM SERPL-SCNC: 138 MMOL/L (ref 135–145)
SP GR UR STRIP.AUTO: 1.01
WBC # BLD AUTO: 10.5 K/UL (ref 4.8–10.8)
WBC #/AREA URNS HPF: ABNORMAL /HPF

## 2021-11-20 PROCEDURE — 700102 HCHG RX REV CODE 250 W/ 637 OVERRIDE(OP): Performed by: EMERGENCY MEDICINE

## 2021-11-20 PROCEDURE — 304999 HCHG REPAIR-SIMPLE/INTERMED LEVEL 1

## 2021-11-20 PROCEDURE — A9270 NON-COVERED ITEM OR SERVICE: HCPCS | Performed by: EMERGENCY MEDICINE

## 2021-11-20 PROCEDURE — 700101 HCHG RX REV CODE 250: Performed by: EMERGENCY MEDICINE

## 2021-11-20 PROCEDURE — 87186 SC STD MICRODIL/AGAR DIL: CPT

## 2021-11-20 PROCEDURE — 70450 CT HEAD/BRAIN W/O DYE: CPT | Mod: ME

## 2021-11-20 PROCEDURE — 99285 EMERGENCY DEPT VISIT HI MDM: CPT

## 2021-11-20 PROCEDURE — 85025 COMPLETE CBC W/AUTO DIFF WBC: CPT

## 2021-11-20 PROCEDURE — 303747 HCHG EXTRA SUTURE

## 2021-11-20 PROCEDURE — 81001 URINALYSIS AUTO W/SCOPE: CPT

## 2021-11-20 PROCEDURE — 87077 CULTURE AEROBIC IDENTIFY: CPT | Mod: 91

## 2021-11-20 PROCEDURE — 80053 COMPREHEN METABOLIC PANEL: CPT

## 2021-11-20 PROCEDURE — 87086 URINE CULTURE/COLONY COUNT: CPT

## 2021-11-20 RX ORDER — INSULIN LISPRO 100 [IU]/ML
5-9 INJECTION, SOLUTION INTRAVENOUS; SUBCUTANEOUS
Status: ON HOLD | COMMUNITY
End: 2022-05-31

## 2021-11-20 RX ORDER — ACETAMINOPHEN 500 MG
1000 TABLET ORAL ONCE
Status: COMPLETED | OUTPATIENT
Start: 2021-11-20 | End: 2021-11-20

## 2021-11-20 RX ORDER — LIDOCAINE HYDROCHLORIDE AND EPINEPHRINE BITARTRATE 20; .01 MG/ML; MG/ML
10 INJECTION, SOLUTION SUBCUTANEOUS ONCE
Status: COMPLETED | OUTPATIENT
Start: 2021-11-20 | End: 2021-11-20

## 2021-11-20 RX ORDER — CEPHALEXIN 250 MG/1
500 CAPSULE ORAL ONCE
Status: COMPLETED | OUTPATIENT
Start: 2021-11-20 | End: 2021-11-20

## 2021-11-20 RX ORDER — CEPHALEXIN 500 MG/1
500 CAPSULE ORAL 4 TIMES DAILY
Qty: 40 CAPSULE | Refills: 0 | Status: SHIPPED | OUTPATIENT
Start: 2021-11-20 | End: 2021-11-30

## 2021-11-20 RX ADMIN — ACETAMINOPHEN 1000 MG: 500 TABLET ORAL at 14:32

## 2021-11-20 RX ADMIN — LIDOCAINE HYDROCHLORIDE,EPINEPHRINE BITARTRATE 10 ML: 20; .01 INJECTION, SOLUTION INFILTRATION; PERINEURAL at 12:30

## 2021-11-20 RX ADMIN — CEPHALEXIN 500 MG: 250 CAPSULE ORAL at 14:45

## 2021-11-20 ASSESSMENT — PAIN DESCRIPTION - PAIN TYPE: TYPE: ACUTE PAIN

## 2021-11-20 ASSESSMENT — FIBROSIS 4 INDEX: FIB4 SCORE: 1.5

## 2021-11-20 NOTE — ED PROVIDER NOTES
ED Provider Note    CHIEF COMPLAINT  Chief Complaint   Patient presents with   • T-5000 FALL   • Head Injury   • Urinary Frequency   • Chills       HPI  Av Bob is a 76 y.o. male who presents for evaluation of 2 ground-level falls today and subsequent head injury.  On Plavix.  Has extensive past medical history including strokes, is essentially wheelchair-bound but is able to sit up in bed, while sitting up in bed he reached for his medicine and fell forward striking his head on the side table.  No loss of conscious, no new focal weakness numbness or tingling, no new neck or back pain.  The patient also, according to the wife, has been a little bit weak over the past day or 2, he has a history of recurring UTI and she was actually taken to urgent care to get a urine test performed.  No fever.  Vomited 1 time last night.  No other acute complaints    REVIEW OF SYSTEMS  Negative for fever, rash, chest pain, dyspnea, abdominal pain, nausea, diarrhea, back pain. All other systems are negative.     PAST MEDICAL HISTORY  Past Medical History:   Diagnosis Date   • ([facility]) Chronic ulcer of right lower extremity with fat layer exposed 12/27/2018   • Acute on chronic renal failure ([facility]) 1/21/2020   • Acute respiratory failure with hypoxia ([facility]) 5/20/2017   • CAD (coronary artery disease)     GIOVANNA to RCA in '97, GIOVANNA X2 to LAD and GIOVANNA X2 to OM in '09   • Cancer ([facility])     2017; chemo lympoma non hodgkins lymphoma   • Cataract     dez iol   • Cerebrovascular accident (CVA) ([facility]) 12/30/2016    Left arm weakness  etiology of stroke not established, lymphoma discovered on MRI evaluation of stroke, L hemiparesis much worse after acute infectious illness in mid 2017, but no specific diagnosed recurrent neurological etiology, all at [facility]   • Chickenpox    • CKD (chronic kidney disease) stage 3, GFR 30-59 ml/min ([facility])    • Controlled gout 2014   • Coronary atherosclerosis of native  coronary artery     S/P PTCA (percutaneous transluminal coronary angioplasty), RCA, 5/1997, patent on cath 7/10/2009 at the time of interventions on his left anterior descending and circumflex coronary arteries   • Daytime sleepiness    • Depression    • Diabetes ([facility])    • Difficulty swallowing    • Enterococcal septicemia (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 Barry   • Normocytic hypochromic anemia 5/20/2017   • NSTEMI (non-ST elevated myocardial infarction) ([facility]) 07/18/2017    complicating UTI with sepsis   • Pain 06/30/2021    right leg foot   • Peripheral vascular disease ([facility])    • Polyneuropathy in diabetes(357.2) 9/11/2013   • Renal mass 1/21/2020   • Septic shock ([facility]) 5/20/2017   • Skin ulcer of calf ([facility]) 2015    Right, Dr. Terry and wound care   • Stroke ([facility]) 2016    left sided weakness   • Urinary bladder disorder    • Urinary incontinence     pt wears pads   • Weakness    • Wound of left leg 2012    Requiring surgery and debridment, Dr. Moore       FAMILY HISTORY  Family History   Problem Relation Age of Onset   • Heart Disease Father         CAD   • Diabetes Father    • Cancer Mother    • Psychiatric Illness Mother         Depression   • Depression Mother    • Kidney stones Brother    • Heart Disease Brother    • Psychiatric Illness Brother         Depression   • Depression Brother    • Suicide Attempts Other    • Psychiatric Illness Other         autism       SOCIAL  HISTORY  Social History     Tobacco Use   • Smoking status: Never Smoker   • Smokeless tobacco: Never Used   • Tobacco comment: continued abstinence   Vaping Use   • Vaping Use: Never used   Substance Use Topics   • Alcohol use: Never   • Drug use: Never       SURGICAL HISTORY  Past Surgical History:   Procedure Laterality Date   • ND INJ LUMBAR/SACRAL,W/ IMAGING  7/27/2021    Procedure: Lumbar L5-S1 interlaminar epidural steroid injection;  Surgeon: Harpal Bennett M.D.;  Location: [facility];  Service: Pain Management   • PB UPPER GI ENDOSCOPY,DIAGNOSIS N/A 7/21/2021    Procedure: GASTROSCOPY - AND DILATION;  Surgeon: Neville Huerta M.D.;  Location: SURGERY SAME DAY [facility];  Service: Gastroenterology   • PB UPPER GI ENDOSCOPY,BIOPSY N/A 7/21/2021    Procedure: GASTROSCOPY, WITH BIOPSY;  Surgeon: Neville Huerta M.D.;  Location: SURGERY SAME DAY [facility];  Service: Gastroenterology   • LUMBAR TRANSFORAMINAL EPIDURAL STEROID INJECTION Right 3/29/2021    Procedure: RIGHT L3-4 and L4-5 transforaminal epidural steroid injection with fluoroscopic guidance;  Surgeon: Harpal Bennett M.D.;  Location: [facility];  Service: Pain Management   • LUMBAR TRANSFORAMINAL EPIDURAL STEROID INJECTION Right 11/2/2020    Procedure: INJECTION, STEROID, SPINE, LUMBAR, EPIDURAL, TRANSFORAMINAL APPROACH;  Surgeon: Harpal Bennett M.D.;  Location: [facility];  Service: Pain Management   • CYSTOSCOPY STENT PLACEMENT Left 2/12/2018    Procedure: CYSTOSCOPY  ;  Surgeon: Rey Barry M.D.;  Location: SURGERY [facility];  Service: Urology   • URETEROSCOPY Left 2/12/2018    Procedure: URETEROSCOPY- FLEXIBLE  ;  Surgeon: Rey Barry M.D.;  Location: SURGERY [facility];  Service: Urology   • LASERTRIPSY Left 2/12/2018    Procedure: LASERTRIPSY - LITHO  ;  Surgeon: Rey Barry M.D.;  Location: SURGERY [facility];  Service: Urology   • WOUND CLOSURE GENERAL  4/3/2012     "Performed by GERHARD GILBERT at SURGERY SAME DAY [facility] ORS   • Z CARDIAC CATH  2009    Stents to LAD, Om   • DAGOBERTO BY LAPAROSCOPY  1998   • ANGIOPLASTY  1997    RCA followed by other stents as noted above.    • Z CARDIAC CATH  1997    stent RCA   • CATARACT EXTRACTION WITH IOL      bilateral   • LITHOTRIPSY     • TONSILLECTOMY     • TONSILLECTOMY AND ADENOIDECTOMY         CURRENT MEDICATIONS  I personally reviewed the medication list in the charting documentation.     ALLERGIES  Allergies   Allergen Reactions   • Diphenhydramine Hcl Anxiety     Pt is able to tolerate  Mg benadryl with less anxiety   • Lorazepam Unspecified     Disorientation   • Ciprofloxacin      Rash,stomach ache   • Spironolactone      Acute kidney injury       MEDICAL RECORD  I have reviewed patient's medical record and pertinent results are listed above.      PHYSICAL EXAM  VITAL SIGNS: /86   Pulse 75   Temp 36.1 °C (97 °F) (Temporal)   Resp 18   Ht 1.778 m (5' 10\")   Wt 93 kg (205 lb 0.4 oz)   SpO2 99%   BMI 29.42 kg/m²    Constitutional: Well appearing patient in no acute distress.  Awake and alert, not toxic nor ill in appearance.  HENT: Normocephalic, left periorbital ecchymosis, 1 cm laceration to the region just below the eyebrow with minimal venous oozing.  Eyes: No scleral icterus. Normal conjunctiva pupils are equal and reactive.  Neck: Comfortable movement without any obvious restriction in the range of motion.  Cardiovascular: Upon ascultation I appreciate a regular heart rhythm and a normal rate.   Thorax & Lungs: Normal nonlabored respirations.  Upon application of the stethoscope for auscultation I find there to be no associated chest wall tenderness.  I appreciate no wheezing, rhonchi or rales. There is normal air movement.    Skin: The exposed portions of skin reveal no obvious rash or other abnormalities.  Extremities/Musculoskeletal: No obvious sign of acute trauma.   Neurologic: Awake, alert, fully " oriented.    DIAGNOSTIC STUDIES / PROCEDURES    LABS/EKGs  Results for orders placed or performed during the hospital encounter of 11/20/21   CBC WITH DIFFERENTIAL   Result Value Ref Range    WBC 10.5 4.8 - 10.8 K/uL    RBC 4.72 4.70 - 6.10 M/uL    Hemoglobin 13.7 (L) 14.0 - 18.0 g/dL    Hematocrit 41.2 (L) 42.0 - 52.0 %    MCV 87.3 81.4 - 97.8 fL    MCH 29.0 27.0 - 33.0 pg    MCHC 33.3 (L) 33.7 - 35.3 g/dL    RDW 46.8 35.9 - 50.0 fL    Platelet Count 278 164 - 446 K/uL    MPV 10.1 9.0 - 12.9 fL    Neutrophils-Polys 84.40 (H) 44.00 - 72.00 %    Lymphocytes 7.30 (L) 22.00 - 41.00 %    Monocytes 6.40 0.00 - 13.40 %    Eosinophils 0.90 0.00 - 6.90 %    Basophils 0.40 0.00 - 1.80 %    Immature Granulocytes 0.60 0.00 - 0.90 %    Nucleated RBC 0.00 /100 WBC    Neutrophils (Absolute) 8.85 (H) 1.82 - 7.42 K/uL    Lymphs (Absolute) 0.77 (L) 1.00 - 4.80 K/uL    Monos (Absolute) 0.67 0.00 - 0.85 K/uL    Eos (Absolute) 0.09 0.00 - 0.51 K/uL    Baso (Absolute) 0.04 0.00 - 0.12 K/uL    Immature Granulocytes (abs) 0.06 0.00 - 0.11 K/uL    NRBC (Absolute) 0.00 K/uL   Comp Metabolic Panel   Result Value Ref Range    Sodium 138 135 - 145 mmol/L    Potassium 4.1 3.6 - 5.5 mmol/L    Chloride 104 96 - 112 mmol/L    Co2 23 20 - 33 mmol/L    Anion Gap 11.0 7.0 - 16.0    Glucose 139 (H) 65 - 99 mg/dL    Bun 32 (H) 8 - 22 mg/dL    Creatinine 1.87 (H) 0.50 - 1.40 mg/dL    Calcium 8.9 8.4 - 10.2 mg/dL    AST(SGOT) 22 12 - 45 U/L    ALT(SGPT) 22 2 - 50 U/L    Alkaline Phosphatase 62 30 - 99 U/L    Total Bilirubin 0.9 0.1 - 1.5 mg/dL    Albumin 3.6 3.2 - 4.9 g/dL    Total Protein 5.6 (L) 6.0 - 8.2 g/dL    Globulin 2.0 1.9 - 3.5 g/dL    A-G Ratio 1.8 g/dL   URINALYSIS,CULTURE IF INDICATED    Specimen: Urine   Result Value Ref Range    Color Yellow     Character Cloudy (A)     Specific Gravity 1.010 <1.035    Ph 8.5 (A) 5.0 - 8.0    Glucose Negative Negative mg/dL    Ketones Negative Negative mg/dL    Protein 30 (A) Negative mg/dL    Bilirubin  Negative Negative    Nitrite Positive (A) Negative    Leukocyte Esterase Large (A) Negative    Occult Blood Moderate (A) Negative    Micro Urine Req Microscopic    ESTIMATED GFR   Result Value Ref Range    GFR If  43 (A) >60 mL/min/1.73 m 2    GFR If Non African American 35 (A) >60 mL/min/1.73 m 2   URINE MICROSCOPIC (W/UA)   Result Value Ref Range    WBC Packed (A) /hpf    RBC 2-5 (A) /hpf    Bacteria Many (A) None /hpf   URINE CULTURE(NEW)    Specimen: Urine   Result Value Ref Range    Significant Indicator NEG     Source UR     Site -     Culture Result -      *Note: Due to a large number of results and/or encounters for the requested time period, some results have not been displayed. A complete set of results can be found in Results Review.        RADIOLOGY  CT-HEAD W/O   Final Result      No acute intracranial abnormality               Laceration Repair Procedure Note    Indication: Laceration    Procedure: The patient was placed in the appropriate position and anesthesia around the laceration was obtained by infiltration using 2% Lidocaine with epinephrine. The area was then explored with no foreign bodies discovered and draped in a sterile fashion. The laceration was closed with 5-0 fast-absorbing gut. There were no additional lacerations requiring repair.    Total repaired wound length: 1 cm.     Other Items: Suture count: 3    The patient tolerated the procedure well.    Complications: None      COURSE & MEDICAL DECISION MAKING  I have reviewed any medical record information, laboratory studies and radiographic results as noted above.  Differential diagnoses includes: ICH, UTI, dehydration, likely abnormalities, anemia    Encounter Summary: This is a very pleasant 76 y.o. male who unfortunately required evaluation in the emergency department today with 2 ground-level falls this morning secondary to a history of stroke as well as generalized weakness over the past day or so.  Laceration noted  to the right face which was repaired with sutures here in the emergency department, he is anticoagulated on Plavix a head CT is obtained which rules out acute intracranial injury.  The patient does have a history of this recurring urinary tract infection, was going to go to urgent care for evaluation but ended up falling coming here.  Urinalysis and blood work will be obtained, he will be reevaluated ------- CT scan is negative.  Blood work is remarkable for a normal WBC, baseline renal function.  Urinalysis indicates clear-cut infection.  Will be treated with Keflex at the review of prior cultures, discharged home in stable condition with strict return instructions provided      DISPOSITION: Discharged home in stable condition      FINAL IMPRESSION  1. Closed head injury, initial encounter    2. Facial laceration, initial encounter    3. Acute cystitis without hematuria           This dictation was created using voice recognition software. The accuracy of the dictation is limited to the abilities of the software. I expect there may be some errors of grammar and possibly content. The nursing notes were reviewed and certain aspects of this information were incorporated into this note.    Electronically signed by: Jose F Leiva M.D., 11/20/2021 12:15 PM

## 2021-11-20 NOTE — ED TRIAGE NOTES
"Pt is currently on Plavix.  He is accompanied by his spouse.  He C/O a head injury (left eyebrow) incurred earlier this AM, after falling off his bed.  Allegedly he experienced 2 falls within the past 6 hours.  Incidentally, she also provides history of recurring UTIs, with urinary frequency, and chills since last night.   Chief Complaint   Patient presents with   • T-5000 FALL   • Head Injury   • Urinary Frequency   • Chills     /86   Pulse 75   Temp 36.1 °C (97 °F) (Temporal)   Resp 18   Ht 1.778 m (5' 10\")   Wt 93 kg (205 lb 0.4 oz)   SpO2 99%   BMI 29.42 kg/m²   Has this patient been vaccinated for COVID YES       "

## 2021-11-20 NOTE — ED NOTES
Patient had episode of incontinence. Wife at bedside to clean pt of stool and replace brief. Declined help from staff.

## 2021-11-20 NOTE — DISCHARGE INSTRUCTIONS
Your stitches will fall out on their own. Return immediately to the emergency department at the first sign of infection including increasing redness, increasing pain, pus in the wound, fever or if you have any other concerns.

## 2021-11-20 NOTE — ED NOTES
Med Rec completed per patient's wife and med list (returned)   Allergies reviewed  No ORAL antibiotics in last 30 days

## 2021-11-22 ENCOUNTER — PATIENT MESSAGE (OUTPATIENT)
Dept: MEDICAL GROUP | Facility: PHYSICIAN GROUP | Age: 76
End: 2021-11-22

## 2021-11-22 LAB
BACTERIA UR CULT: ABNORMAL
SIGNIFICANT IND 70042: ABNORMAL
SITE SITE: ABNORMAL
SOURCE SOURCE: ABNORMAL

## 2021-11-22 NOTE — ED NOTES
"ED Positive Culture Follow-up/Notification Note:    Date: 11/22/2021     Patient seen in the ED on 11/20/2021 for round level fall, urinary frequency, and chills. Has felt weak last 2 days. Patient received cephalexin 500 mg PO x1 in the ED.  1. Closed head injury, initial encounter    2. Facial laceration, initial encounter    3. Acute cystitis without hematuria       Discharge Medication List as of 11/20/2021  2:51 PM      START taking these medications    Details   cephALEXin (KEFLEX) 500 MG Cap Take 1 Capsule by mouth 4 times a day for 10 days., Disp-40 Capsule, R-0, Normal             Allergies: Diphenhydramine hcl, Lorazepam, Ciprofloxacin, and Spironolactone     Vitals:    11/20/21 1119 11/20/21 1122 11/20/21 1448   BP:  130/86 140/89   Pulse:  75 81   Resp:  18 16   Temp:  36.1 °C (97 °F) 36.8 °C (98.3 °F)   TempSrc:  Temporal Temporal   SpO2:  99% 96%   Weight: 93 kg (205 lb 0.4 oz)     Height: 1.778 m (5' 10\")         Final cultures:   Results     Procedure Component Value Units Date/Time    URINE CULTURE(NEW) [[id number]]  (Abnormal)  (Susceptibility) Collected: 11/20/21 1235    Order Status: Completed Specimen: Urine Updated: 11/22/21 1106     Significant Indicator POS     Source UR     Site -     Culture Result -      Klebsiella pneumoniae  ,000 cfu/mL        Proteus mirabilis  ,000 cfu/mL      Narrative:      Indication for culture:->Patient WITHOUT an indwelling Michaels  catheter in place with new onset of Dysuria, Frequency,  Urgency, and/or Suprapubic pain    Susceptibility     Klebsiella pneumoniae (1)     Antibiotic Interpretation Microscan   Method Status    Amikacin  [*]  Sensitive <=16 mcg/mL GARRY Final    Amoxicillin/CA  [*]  Sensitive <=8/4 mcg/mL GARRY Final    Aztreonam  [*]  Sensitive <=4 mcg/mL GARRY Final    Ceftolozane+Tazobactam  [*]  Sensitive <=2 mcg/mL GARRY Final    Ceftriaxone Sensitive <=1 mcg/mL GARRY Final    Ceftazidime  [*]  Sensitive <=1 mcg/mL GARRY Final    Cefazolin " Sensitive <=2 mcg/mL GARRY Final    Ciprofloxacin Sensitive <=0.25 mcg/mL GARRY Final    Cefepime Sensitive <=2 mcg/mL GARRY Final    Cefuroxime Sensitive <=4 mcg/mL GARRY Final    Ceftazidime+Avibactam  [*]  Sensitive <=4 mcg/mL GARRY Final    Ertapenem  [*]  Sensitive <=0.5 mcg/mL GARRY Final    Ampicillin/sulbactam Sensitive <=4/2 mcg/mL GARRY Final    Nitrofurantoin Sensitive <=32 mcg/mL GARRY Final    Gentamicin Sensitive <=2 mcg/mL GARRY Final    Imipenem  [*]  Sensitive <=1 mcg/mL GARRY Final    Levofloxacin Sensitive <=0.5 mcg/mL GARRY Final    Meropenem  [*]  Sensitive <=1 mcg/mL GARRY Final    Meropenem/Vaborbactam  [*]  Sensitive <=2 mcg/mL GARRY Final    Minocycline Sensitive <=4 mcg/mL GARRY Final    Pip/Tazobactam Sensitive <=8 mcg/mL GARRY Final    Trimeth/Sulfa Sensitive <=0.5/9.5 mcg/mL GARRY Final    Tetracycline  [*]  Sensitive <=4 mcg/mL GARRY Final    Tigecycline Sensitive <=2 mcg/mL GARRY Final    Tobramycin Sensitive <=2 mcg/mL GARRY Final          Proteus mirabilis (2)     Antibiotic Interpretation Microscan   Method Status    Amikacin  [*]  Sensitive <=16 mcg/mL GARRY Final    Amoxicillin/CA  [*]  Sensitive <=8/4 mcg/mL GARRY Final    Aztreonam  [*]  Sensitive <=4 mcg/mL GARRY Final    Ceftolozane+Tazobactam  [*]  Sensitive <=2 mcg/mL GARRY Final    Ceftriaxone Sensitive <=1 mcg/mL GARRY Final    Ceftazidime  [*]  Sensitive <=1 mcg/mL GARRY Final    Cefazolin Sensitive <=2 mcg/mL GARRY Final    Ciprofloxacin Sensitive <=0.25 mcg/mL GARRY Final    Cefepime Sensitive <=2 mcg/mL GARRY Final    Cefuroxime Sensitive <=4 mcg/mL GARRY Final    Ertapenem  [*]  Sensitive <=0.5 mcg/mL GARRY Final    Ampicillin/sulbactam Sensitive <=4/2 mcg/mL GARRY Final    Nitrofurantoin Resistant >64 mcg/mL GARRY Final    Ampicillin Sensitive <=8 mcg/mL GARRY Final    Gentamicin Sensitive <=2 mcg/mL GARRY Final    Levofloxacin Sensitive <=0.5 mcg/mL GARRY Final    Meropenem  [*]  Sensitive <=1 mcg/mL GARRY Final    Meropenem/Vaborbactam  [*]  Sensitive <=2 mcg/mL GARRY Final     Minocycline Intermediate 8 mcg/mL GARRY Final    Pip/Tazobactam Sensitive <=8 mcg/mL GARRY Final    Trimeth/Sulfa Sensitive <=0.5/9.5 mcg/mL GARRY Final    Tetracycline  [*]  Resistant >8 mcg/mL GARRY Final    Tobramycin Sensitive <=2 mcg/mL GARRY Final           [*]  Suppressed Antibiotic          Condensed View                   URINALYSIS,CULTURE IF INDICATED [[id number]]  (Abnormal) Collected: 11/20/21 1235    Order Status: Completed Specimen: Urine Updated: 11/20/21 1338     Color Yellow     Character Cloudy     Specific Gravity 1.010     Ph 8.5     Glucose Negative mg/dL      Ketones Negative mg/dL      Protein 30 mg/dL      Bilirubin Negative     Nitrite Positive     Leukocyte Esterase Large     Occult Blood Moderate     Micro Urine Req Microscopic    Narrative:      Indication for culture:->Patient WITHOUT an indwelling Michaels  catheter in place with new onset of Dysuria, Frequency,  Urgency, and/or Suprapubic pain          Plan:   Appropriate antibiotic therapy prescribed to cover organisms in culture. Called patient to see how he is doing, and spoke with patient's wife Usha. She said that the patient is doing better than he was - no more vomiting or chills - but he still has some fatigue. She called his primary care doctor today to give an update as well. Advised that patient continue antibiotics until completed, and to seek further medical attention if patient develops worsening of symptoms or new fever/chills. Patient's wife verbalized understanding and did not have any questions.     Sia Call, PharmD  PGY2 Infectious Diseases Pharmacy Resident

## 2021-11-22 NOTE — PATIENT COMMUNICATION
Called and spoke with Usha. She states pt's sx of urgency and foul odor has improved. Denies fever, chills, emesis, nausea. Pt has been eating and drinking normally. Per Usha pt usually gets diarrhea from abx- none yet. Usha also stated pt was told by urology he is colonized. Only concern is fatigue, although usha stated she doesn't know how well pt slept last night. Agreed to wait another 24 hours and Usha will check-in with us for update on pt condition. Pt is taking abx as instructed.  Updated Dr. Bunch.

## 2021-11-23 ENCOUNTER — TELEPHONE (OUTPATIENT)
Dept: MEDICAL GROUP | Facility: PHYSICIAN GROUP | Age: 76
End: 2021-11-23

## 2021-11-23 NOTE — TELEPHONE ENCOUNTER
Phone Number Called: [phone] (home)       Call outcome: Spoke to patient regarding message below.    Message: Patient states that he still feels fatigue but it has improved since yesterday. Mentioned he wasn't sleeping well.

## 2021-11-23 NOTE — TELEPHONE ENCOUNTER
----- Message from Alicia Peterson R.N. sent at 11/23/2021  9:55 AM PST -----  Regarding: Call pt  Can you call pt or wife (Usha) and get an update on pt. He was extremely fatigued yesterday and wife was concerned. Update Thyssen on condition today please.    Thanks-      ----- Message -----  From: Alicia Peterson R.N.  Sent: 11/23/2021  12:00 AM PST  To: Alicia Peterson R.N.  Subject: call                                             Fatigue?

## 2021-12-14 ENCOUNTER — TELEPHONE (OUTPATIENT)
Dept: MEDICAL GROUP | Facility: PHYSICIAN GROUP | Age: 76
End: 2021-12-14

## 2021-12-14 NOTE — TELEPHONE ENCOUNTER
1. Caller Name: Usha Bob                        Call Back Number: [phone]      How would the patient prefer to be contacted with a response: Phone call OK to leave a detailed message    Usha called and left a vm that she is worried about Av.  He has amonia smelling urine, not eating and feels like he has to have a bowel movement but nothing comes out.  Can you have RN call and speak with Usha

## 2021-12-14 NOTE — ASSESSMENT & PLAN NOTE
Chronic, uncontrolled, however improving. Patient has made good changes to his sleep hygiene, cutting down on his TV and before bedtime. Patient is taking melatonin either 5 or 10 mg by mouth daily at bedtime when necessary.    ROS is NEGATIVE for generalized weakness/fatigue, headaches upon awakening, daytime hypersomnolence, dyspnea, tachypnea, respiratory distress, cyanotic skin changes.   both parents smoked

## 2021-12-15 NOTE — TELEPHONE ENCOUNTER
We could have him come in for lab work up or they could try dispatch health to expedite? He has so many different meds and conditions it could really be a lot of things contributing.

## 2021-12-15 NOTE — TELEPHONE ENCOUNTER
Called and spoke with Usha. Pt was treated w/UTI last month, Usha reports his urine smells like ammonia. Hard to tell if urine patter is more frequent- wears Depends. Pt had been able to identify urinary frequency last month when driscoll had the treated UTI. Today the pt denied feeling urinary urgency. Pt does have Urology appt this Thursday (12/16) for scan, then follow-up appt to discuss scan is next Monday (12/20).     Wife reports he is weaker, unable to assist his wife with movement as much as he has been to.     Also appetite is down, pt eats breakfast, but hasn't been eating much for lunch or dinner.     Pt also had diarrhea X2 today. Last abx dosage was 11/30/21.

## 2021-12-16 ENCOUNTER — APPOINTMENT (OUTPATIENT)
Dept: PHYSICAL MEDICINE AND REHAB | Facility: REHABILITATION | Age: 76
End: 2021-12-16
Payer: MEDICARE

## 2021-12-20 ENCOUNTER — HOSPITAL ENCOUNTER (OUTPATIENT)
Facility: MEDICAL CENTER | Age: 76
End: 2021-12-20
Attending: PHYSICIAN ASSISTANT
Payer: MEDICARE

## 2021-12-20 PROCEDURE — 87077 CULTURE AEROBIC IDENTIFY: CPT

## 2021-12-20 PROCEDURE — 87086 URINE CULTURE/COLONY COUNT: CPT

## 2021-12-20 PROCEDURE — 87186 SC STD MICRODIL/AGAR DIL: CPT

## 2022-01-02 DIAGNOSIS — E11.69 TYPE 2 DIABETES MELLITUS WITH OTHER SPECIFIED COMPLICATION, WITH LONG-TERM CURRENT USE OF INSULIN (HCC): ICD-10-CM

## 2022-01-02 DIAGNOSIS — Z79.4 TYPE 2 DIABETES MELLITUS WITH OTHER SPECIFIED COMPLICATION, WITH LONG-TERM CURRENT USE OF INSULIN (HCC): ICD-10-CM

## 2022-01-03 RX ORDER — DULAGLUTIDE 3 MG/.5ML
0.5 INJECTION, SOLUTION SUBCUTANEOUS
Qty: 2 ML | Refills: 3 | Status: SHIPPED | OUTPATIENT
Start: 2022-01-07 | End: 2022-04-29

## 2022-01-06 ENCOUNTER — HOSPITAL ENCOUNTER (OUTPATIENT)
Facility: MEDICAL CENTER | Age: 77
End: 2022-01-06
Attending: NURSE PRACTITIONER
Payer: MEDICARE

## 2022-01-06 PROCEDURE — 87186 SC STD MICRODIL/AGAR DIL: CPT

## 2022-01-06 PROCEDURE — 87086 URINE CULTURE/COLONY COUNT: CPT

## 2022-01-06 PROCEDURE — 87077 CULTURE AEROBIC IDENTIFY: CPT

## 2022-01-13 ENCOUNTER — OFFICE VISIT (OUTPATIENT)
Dept: MEDICAL GROUP | Facility: PHYSICIAN GROUP | Age: 77
End: 2022-01-13
Payer: MEDICARE

## 2022-01-13 ENCOUNTER — HOSPITAL ENCOUNTER (OUTPATIENT)
Facility: MEDICAL CENTER | Age: 77
End: 2022-01-13
Attending: INTERNAL MEDICINE
Payer: MEDICARE

## 2022-01-13 VITALS
DIASTOLIC BLOOD PRESSURE: 66 MMHG | OXYGEN SATURATION: 99 % | TEMPERATURE: 97.6 F | RESPIRATION RATE: 12 BRPM | SYSTOLIC BLOOD PRESSURE: 110 MMHG | HEIGHT: 70 IN | BODY MASS INDEX: 29.35 KG/M2 | HEART RATE: 61 BPM | WEIGHT: 205 LBS

## 2022-01-13 DIAGNOSIS — N41.0 ACUTE PROSTATITIS: ICD-10-CM

## 2022-01-13 DIAGNOSIS — N18.32 STAGE 3B CHRONIC KIDNEY DISEASE: ICD-10-CM

## 2022-01-13 DIAGNOSIS — Z79.4 TYPE 2 DIABETES MELLITUS WITH OTHER SPECIFIED COMPLICATION, WITH LONG-TERM CURRENT USE OF INSULIN (HCC): ICD-10-CM

## 2022-01-13 DIAGNOSIS — N17.9 ACUTE KIDNEY INJURY (HCC): ICD-10-CM

## 2022-01-13 DIAGNOSIS — E11.69 TYPE 2 DIABETES MELLITUS WITH OTHER SPECIFIED COMPLICATION, WITH LONG-TERM CURRENT USE OF INSULIN (HCC): ICD-10-CM

## 2022-01-13 LAB
HBA1C MFR BLD: 7.1 % (ref 0–5.6)
INT CON NEG: ABNORMAL
INT CON POS: ABNORMAL

## 2022-01-13 PROCEDURE — 36415 COLL VENOUS BLD VENIPUNCTURE: CPT | Performed by: INTERNAL MEDICINE

## 2022-01-13 PROCEDURE — 85025 COMPLETE CBC W/AUTO DIFF WBC: CPT

## 2022-01-13 PROCEDURE — 80053 COMPREHEN METABOLIC PANEL: CPT

## 2022-01-13 PROCEDURE — 99000 SPECIMEN HANDLING OFFICE-LAB: CPT | Performed by: INTERNAL MEDICINE

## 2022-01-13 PROCEDURE — 99214 OFFICE O/P EST MOD 30 MIN: CPT | Performed by: INTERNAL MEDICINE

## 2022-01-13 PROCEDURE — 83036 HEMOGLOBIN GLYCOSYLATED A1C: CPT | Performed by: INTERNAL MEDICINE

## 2022-01-13 RX ORDER — INSULIN GLARGINE 300 U/ML
24 INJECTION, SOLUTION SUBCUTANEOUS DAILY
Qty: 4.5 ML | Refills: 3 | Status: SHIPPED | OUTPATIENT
Start: 2022-01-13 | End: 2022-02-15

## 2022-01-13 RX ORDER — FLUOROURACIL 50 MG/G
CREAM TOPICAL
COMMUNITY
End: 2022-01-19

## 2022-01-13 RX ORDER — SULFAMETHOXAZOLE AND TRIMETHOPRIM 400; 80 MG/1; MG/1
TABLET ORAL
COMMUNITY
Start: 2022-01-06 | End: 2022-01-19

## 2022-01-13 ASSESSMENT — PATIENT HEALTH QUESTIONNAIRE - PHQ9: CLINICAL INTERPRETATION OF PHQ2 SCORE: 0

## 2022-01-13 ASSESSMENT — FIBROSIS 4 INDEX: FIB4 SCORE: 1.25

## 2022-01-13 NOTE — PROGRESS NOTES
Subjective:   Chief Complaint/History of Present Illness:  Av Bob is a 76 y.o. male established patient who presents today to discuss medical problems as listed below. Av is accompanied by his wife, Usha.    Problem   Acute Prostatitis    New problem of prostatitis identified by dispatch health when patient developed hematuria with rectal pain.  He followed up with his urology PA and was placed back on Bactrim.  He was recently on Bactrim and has a history of recurrent urinary tract infections related to neurogenic bladder from prior stroke.  Rectal pain is dissipated however he continues to have hematuria.  He had associated CATA on CKD with this infection.     Acute kidney injury (HCC)    He developed CATA on CKD in December 2021 related to prostate infection.  Creatinine up to 2.6, baseline closer to 1.4.  Patient was recently treated with Bactrim at full dose and then was reinitiated on Bactrim at renal dosing.  Creatinine has since trended from 2.6-2.3-2.2, last check was on January 9.  Continues to have hematuria.  No recent white blood cell count.     Type 2 Diabetes Mellitus, With Long-Term Current Use of Insulin ([facility])       Ref. Range 1/13/2022 10:43   Glycohemoglobin Latest Ref Range: 0.0 - 5.6 % 7.1 (A)      Ref. Range 10/6/2021 11:38   Micro Alb Creat Ratio Latest Ref Range: 0 - 30 mg/g 42 (H)     Longstanding history of type 2 diabetes on insulin.  Previously followed with endocrinology, Dr. Rasheed.     Current regimen includes Trulicity 3 mg weekly,Toujeo 24 units daily, Humalog 5 units for sugars between 101-150, increase by 2 units if greater than 150.  Correction dosage is 2: 50 greater than 150.    Complicated by retinal involvement, neuropathy, CKDIII, and microalbuminuria.       Stage 3b Chronic Kidney Disease (Hcc)    He has a history of chronic kidney disease related to nephrolithiasis, recurrent UTIs, and BPH as well as history of hypertension and diabetes.  He is following  with nephrology, Dr. Jarvis.     Spironolactone was discontinued due to worsening chronic kidney disease.    Other current renally excreted medications include losartan 50 mg daily, potassium chloride 8 mEq daily, and Toujeo/Humalog.    Recent declining kidney function related to prostatitis, creatinine up to 2.6 and now slowly trending down, last check was at 2.3.          Current Medications:  Current Outpatient Medications Ordered in Epic   Medication Sig Dispense Refill   • sulfamethoxazole-trimethoprim (BACTRIM) 400-80 MG Tab TAKE 1 TABLET BY MOUTH EVERY 12 HOURS FOR 14 DAYS     • Insulin Glargine, 1 Unit Dial, (TOUJEO SOLOSTAR) 300 UNIT/ML Solution Pen-injector Inject 24 Units under the skin every day. 4.5 mL 3   • Dulaglutide (TRULICITY) 3 MG/0.5ML Solution Pen-injector Inject 0.5 mL under the skin every Friday. 2 mL 3   • insulin lispro (ADMELOG SOLOSTAR) 100 UNIT/ML Solution Pen-injector injection PEN Inject 5-15 Units under the skin 3 times a day before meals. Start at 5 units then 2 units for every 50 points over 150     • potassium chloride (KLOR-CON) 8 MEQ tablet Take 1 Tablet by mouth every day. 90 Tablet 3   • metoprolol SR (TOPROL XL) 100 MG TABLET SR 24 HR Take 1 Tablet by mouth every day. 90 Tablet 3   • losartan (COZAAR) 50 MG Tab Take 1 Tablet by mouth 2 times a day. 180 Tablet 3   • fenofibrate (TRICOR) 145 MG Tab Take 1 Tablet by mouth every day. 90 Tablet 3   • clopidogrel (PLAVIX) 75 MG Tab Take 1 Tablet by mouth every day. 90 Tablet 3   • amLODIPine (NORVASC) 5 MG Tab Take 1 Tablet by mouth every day. 90 Tablet 3   • atorvastatin (LIPITOR) 80 MG tablet Take 1 Tablet by mouth every evening. 90 Tablet 3   • omeprazole (PRILOSEC) 20 MG delayed-release capsule Take 20 mg by mouth every day.     • gabapentin (NEURONTIN) 100 MG Cap TAKE 1 TO 3 CAPSULES BY MOUTH AT BEDTIME AS NEEDED FOR PAIN (Patient taking differently: Take 100-300 mg by mouth every day.) 90 capsule 3   • fluoxetine (PROZAC) 40 MG  "capsule TAKE 1 CAPSULE BY MOUTH EVERY DAY (Patient taking differently: Take 40 mg by mouth every day.) 90 capsule 3   • allopurinol (ZYLOPRIM) 100 MG Tab TAKE 1 TABLET BY MOUTH EVERY DAY (Patient taking differently: Take 100 mg by mouth every day.) 100 tablet 3   • Multiple Vitamin (MULTI VITAMIN MENS PO) Take 1 Tablet by mouth every day.     • aspirin EC (ECOTRIN) 81 MG Tablet Delayed Response Take 81 mg by mouth every day.     • Probiotic Product (PROBIOTIC DAILY PO) Take 1 Cap by mouth 2 Times a Day.     • magnesium oxide (MAG-OX) 400 MG Tab Take 800 mg by mouth 2 times a day.     • finasteride (PROSCAR) 5 MG Tab Take 1 Tab by mouth every day. 30 Tab 0   • alfuzosin (UROXATRAL) 10 MG SR tablet Take 10 mg by mouth every day.     • methenamine hip (HIPPREX) 1 GM Tab Take 1 g by mouth 2 times a day.     • acetaminophen (TYLENOL) 500 MG Tab Take 1,000 mg by mouth every 6 hours as needed for Mild Pain or Moderate Pain.     • Cholecalciferol (VITAMIN D) 2000 units Cap Take 2,000 Units by mouth every day.     • fluorouracil (EFUDEX) 5 % cream fluorouracil 5 % topical cream       No current Epic-ordered facility-administered medications on file.          Objective:   Physical Exam:    Vitals: /66 (BP Location: Right arm, Patient Position: Sitting, BP Cuff Size: Adult)   Pulse 61   Temp 36.4 °C (97.6 °F) (Temporal)   Resp 12   Ht 1.778 m (5' 10\")   Wt 93 kg (205 lb)   SpO2 99%    BMI: Body mass index is 29.41 kg/m².  Physical Exam  Constitutional:       General: He is not in acute distress.     Appearance: He is not toxic-appearing.      Comments: Chronically ill appearing, drowsy   Cardiovascular:      Rate and Rhythm: Normal rate and regular rhythm.      Pulses: Normal pulses.   Pulmonary:      Effort: Pulmonary effort is normal. No respiratory distress.   Abdominal:      General: Bowel sounds are normal.      Palpations: Abdomen is soft.      Tenderness: There is no abdominal tenderness.   Skin:     " General: Skin is warm and dry.      Findings: No rash.   Neurological:      Comments: Stable hemiplegia   Psychiatric:         Mood and Affect: Mood normal.         Behavior: Behavior normal.         Thought Content: Thought content normal.         Judgment: Judgment normal.      Comments: Drowsy/falls asleep easily          Assessment and Plan:   Av is a 76 y.o. male with the following:  Problem List Items Addressed This Visit     Stage 3b chronic kidney disease (HCC)     Chronic and ongoing problem.  Met with dispatch health due to rectal pain and was diagnosed with prostatitis.  Given IV fluid and creatinine improved from 2.6-2.2.  Will recheck creatinine again so we can continue renally dosing medicines as needed and update his nephrologist if not improving.  He is on Bactrim for presumed prostatitis and will follow-up in 2 weeks to determine length of treatment.         Relevant Orders    CBC WITH DIFFERENTIAL    Comp Metabolic Panel    Type 2 diabetes mellitus, with long-term current use of insulin (HCC)     Chronic and ongoing problem.  A1c continues to be at goal, he actually had some hypoglycemic readings likely due to CATA with decreased renal clearance of insulin.  Continue reduced dose of Toujeo 24 units daily as well as Trulicity and Humalog.  Continue checking blood sugars regularly and reducing insulin as needed.  Update kidney function.         Relevant Medications    Insulin Glargine, 1 Unit Dial, (TOUJEO SOLOSTAR) 300 UNIT/ML Solution Pen-injector    Other Relevant Orders    POCT  A1C (Completed)    CBC WITH DIFFERENTIAL    Comp Metabolic Panel    Acute kidney injury (HCC)     Acute and decompensated problem.  We will recheck his kidney function and potassium in clinic today.  Continue with lower dosing of Toujeo due to hypoglycemia likely from worsening renal function.  Continue holding potassium while we are waiting for his labs.  May need to transition or adjust dose of Bactrim if kidney  function continues to be reduced or return to urology.         Acute prostatitis     Acute and decompensated problem.  We will check blood counts and recheck kidney function.  May need to renally dose medicines.  Currently on Bactrim was told to take for 2 weeks and then we will determine if additional therapy would be indicated.                RTC: Return in about 3 months (around 4/13/2022).    I spent a total of 34 minutes with record review, exam, communication with the patient, communication with other providers, and documentation of this encounter.    PLEASE NOTE: This dictation was created using voice recognition software. I have made every reasonable attempt to correct obvious errors, but I expect that there are errors of grammar and possibly content that I did not discover before finalizing the note.      Madison Bunch, DO  Geriatric and Internal Medicine  [facility]

## 2022-01-13 NOTE — ASSESSMENT & PLAN NOTE
Acute and decompensated problem.  We will check blood counts and recheck kidney function.  May need to renally dose medicines.  Currently on Bactrim was told to take for 2 weeks and then we will determine if additional therapy would be indicated.  
Acute and decompensated problem.  We will recheck his kidney function and potassium in clinic today.  Continue with lower dosing of Toujeo due to hypoglycemia likely from worsening renal function.  Continue holding potassium while we are waiting for his labs.  May need to transition or adjust dose of Bactrim if kidney function continues to be reduced or return to urology.  
Chronic and ongoing problem.  A1c continues to be at goal, he actually had some hypoglycemic readings likely due to CATA with decreased renal clearance of insulin.  Continue reduced dose of Toujeo 24 units daily as well as Trulicity and Humalog.  Continue checking blood sugars regularly and reducing insulin as needed.  Update kidney function.  
Chronic and ongoing problem.  [facility] and was diagnosed with prostatitis.  Given IV fluid and creatinine improved from 2.6-2.2.  Will recheck creatinine again so we can continue renally dosing medicines as needed and update his nephrologist if not improving.  He is on Bactrim for presumed prostatitis and will follow-up in 2 weeks to determine length of treatment.  
no chest pain, no cough, and no shortness of breath.

## 2022-01-14 LAB
ALBUMIN SERPL BCP-MCNC: 3.4 G/DL (ref 3.2–4.9)
ALBUMIN/GLOB SERPL: 1.9 G/DL
ALP SERPL-CCNC: 73 U/L (ref 30–99)
ALT SERPL-CCNC: 16 U/L (ref 2–50)
ANION GAP SERPL CALC-SCNC: 11 MMOL/L (ref 7–16)
AST SERPL-CCNC: 18 U/L (ref 12–45)
BASOPHILS # BLD AUTO: 0.2 % (ref 0–1.8)
BASOPHILS # BLD: 0.02 K/UL (ref 0–0.12)
BILIRUB SERPL-MCNC: 0.4 MG/DL (ref 0.1–1.5)
BUN SERPL-MCNC: 54 MG/DL (ref 8–22)
CALCIUM SERPL-MCNC: 8.6 MG/DL (ref 8.5–10.5)
CHLORIDE SERPL-SCNC: 105 MMOL/L (ref 96–112)
CO2 SERPL-SCNC: 20 MMOL/L (ref 20–33)
CREAT SERPL-MCNC: 2.61 MG/DL (ref 0.5–1.4)
EOSINOPHIL # BLD AUTO: 0.24 K/UL (ref 0–0.51)
EOSINOPHIL NFR BLD: 3 % (ref 0–6.9)
ERYTHROCYTE [DISTWIDTH] IN BLOOD BY AUTOMATED COUNT: 52.5 FL (ref 35.9–50)
GLOBULIN SER CALC-MCNC: 1.8 G/DL (ref 1.9–3.5)
GLUCOSE SERPL-MCNC: 196 MG/DL (ref 65–99)
HCT VFR BLD AUTO: 40.9 % (ref 42–52)
HGB BLD-MCNC: 12.9 G/DL (ref 14–18)
IMM GRANULOCYTES # BLD AUTO: 0.05 K/UL (ref 0–0.11)
IMM GRANULOCYTES NFR BLD AUTO: 0.6 % (ref 0–0.9)
LYMPHOCYTES # BLD AUTO: 0.83 K/UL (ref 1–4.8)
LYMPHOCYTES NFR BLD: 10.3 % (ref 22–41)
MCH RBC QN AUTO: 29.3 PG (ref 27–33)
MCHC RBC AUTO-ENTMCNC: 31.5 G/DL (ref 33.7–35.3)
MCV RBC AUTO: 92.7 FL (ref 81.4–97.8)
MONOCYTES # BLD AUTO: 0.47 K/UL (ref 0–0.85)
MONOCYTES NFR BLD AUTO: 5.8 % (ref 0–13.4)
NEUTROPHILS # BLD AUTO: 6.43 K/UL (ref 1.82–7.42)
NEUTROPHILS NFR BLD: 80.1 % (ref 44–72)
NRBC # BLD AUTO: 0 K/UL
NRBC BLD-RTO: 0 /100 WBC
PLATELET # BLD AUTO: 297 K/UL (ref 164–446)
PMV BLD AUTO: 10.4 FL (ref 9–12.9)
POTASSIUM SERPL-SCNC: 4.3 MMOL/L (ref 3.6–5.5)
PROT SERPL-MCNC: 5.2 G/DL (ref 6–8.2)
RBC # BLD AUTO: 4.41 M/UL (ref 4.7–6.1)
SODIUM SERPL-SCNC: 136 MMOL/L (ref 135–145)
WBC # BLD AUTO: 8 K/UL (ref 4.8–10.8)

## 2022-01-17 DIAGNOSIS — N41.0 ACUTE PROSTATITIS: ICD-10-CM

## 2022-01-17 DIAGNOSIS — N17.9 AKI (ACUTE KIDNEY INJURY) (HCC): ICD-10-CM

## 2022-01-18 ENCOUNTER — HOSPITAL ENCOUNTER (OUTPATIENT)
Dept: LAB | Facility: MEDICAL CENTER | Age: 77
End: 2022-01-18
Attending: INTERNAL MEDICINE
Payer: MEDICARE

## 2022-01-18 DIAGNOSIS — N17.9 AKI (ACUTE KIDNEY INJURY) (HCC): ICD-10-CM

## 2022-01-18 DIAGNOSIS — N41.0 ACUTE PROSTATITIS: ICD-10-CM

## 2022-01-18 LAB
ALBUMIN SERPL BCP-MCNC: 3.4 G/DL (ref 3.2–4.9)
ALBUMIN/GLOB SERPL: 1.4 G/DL
ALP SERPL-CCNC: 72 U/L (ref 30–99)
ALT SERPL-CCNC: 14 U/L (ref 2–50)
ANION GAP SERPL CALC-SCNC: 13 MMOL/L (ref 7–16)
AST SERPL-CCNC: 18 U/L (ref 12–45)
BASOPHILS # BLD AUTO: 0.5 % (ref 0–1.8)
BASOPHILS # BLD: 0.05 K/UL (ref 0–0.12)
BILIRUB SERPL-MCNC: 0.4 MG/DL (ref 0.1–1.5)
BUN SERPL-MCNC: 55 MG/DL (ref 8–22)
CALCIUM SERPL-MCNC: 9.1 MG/DL (ref 8.5–10.5)
CHLORIDE SERPL-SCNC: 103 MMOL/L (ref 96–112)
CO2 SERPL-SCNC: 19 MMOL/L (ref 20–33)
CREAT SERPL-MCNC: 2.95 MG/DL (ref 0.5–1.4)
CRP SERPL HS-MCNC: 0.79 MG/DL (ref 0–0.75)
EOSINOPHIL # BLD AUTO: 0.27 K/UL (ref 0–0.51)
EOSINOPHIL NFR BLD: 2.6 % (ref 0–6.9)
ERYTHROCYTE [DISTWIDTH] IN BLOOD BY AUTOMATED COUNT: 50.7 FL (ref 35.9–50)
GLOBULIN SER CALC-MCNC: 2.5 G/DL (ref 1.9–3.5)
GLUCOSE SERPL-MCNC: 166 MG/DL (ref 65–99)
HCT VFR BLD AUTO: 41.1 % (ref 42–52)
HGB BLD-MCNC: 13 G/DL (ref 14–18)
IMM GRANULOCYTES # BLD AUTO: 0.08 K/UL (ref 0–0.11)
IMM GRANULOCYTES NFR BLD AUTO: 0.8 % (ref 0–0.9)
LYMPHOCYTES # BLD AUTO: 0.78 K/UL (ref 1–4.8)
LYMPHOCYTES NFR BLD: 7.4 % (ref 22–41)
MCH RBC QN AUTO: 29 PG (ref 27–33)
MCHC RBC AUTO-ENTMCNC: 31.6 G/DL (ref 33.7–35.3)
MCV RBC AUTO: 91.5 FL (ref 81.4–97.8)
MONOCYTES # BLD AUTO: 0.55 K/UL (ref 0–0.85)
MONOCYTES NFR BLD AUTO: 5.2 % (ref 0–13.4)
NEUTROPHILS # BLD AUTO: 8.85 K/UL (ref 1.82–7.42)
NEUTROPHILS NFR BLD: 83.5 % (ref 44–72)
NRBC # BLD AUTO: 0 K/UL
NRBC BLD-RTO: 0 /100 WBC
PLATELET # BLD AUTO: 349 K/UL (ref 164–446)
PMV BLD AUTO: 10.3 FL (ref 9–12.9)
POTASSIUM SERPL-SCNC: 4.8 MMOL/L (ref 3.6–5.5)
PROT SERPL-MCNC: 5.9 G/DL (ref 6–8.2)
RBC # BLD AUTO: 4.49 M/UL (ref 4.7–6.1)
SODIUM SERPL-SCNC: 135 MMOL/L (ref 135–145)
WBC # BLD AUTO: 10.6 K/UL (ref 4.8–10.8)

## 2022-01-18 PROCEDURE — 80053 COMPREHEN METABOLIC PANEL: CPT

## 2022-01-18 PROCEDURE — 87086 URINE CULTURE/COLONY COUNT: CPT

## 2022-01-18 PROCEDURE — 87186 SC STD MICRODIL/AGAR DIL: CPT

## 2022-01-18 PROCEDURE — 87077 CULTURE AEROBIC IDENTIFY: CPT

## 2022-01-18 PROCEDURE — 81001 URINALYSIS AUTO W/SCOPE: CPT

## 2022-01-18 PROCEDURE — 85025 COMPLETE CBC W/AUTO DIFF WBC: CPT

## 2022-01-18 PROCEDURE — 36415 COLL VENOUS BLD VENIPUNCTURE: CPT

## 2022-01-18 PROCEDURE — 86140 C-REACTIVE PROTEIN: CPT

## 2022-01-19 ENCOUNTER — TELEPHONE (OUTPATIENT)
Dept: MEDICAL GROUP | Facility: PHYSICIAN GROUP | Age: 77
End: 2022-01-19

## 2022-01-19 ENCOUNTER — APPOINTMENT (OUTPATIENT)
Dept: RADIOLOGY | Facility: MEDICAL CENTER | Age: 77
End: 2022-01-19
Attending: EMERGENCY MEDICINE
Payer: MEDICARE

## 2022-01-19 ENCOUNTER — HOSPITAL ENCOUNTER (OUTPATIENT)
Facility: MEDICAL CENTER | Age: 77
End: 2022-01-22
Attending: EMERGENCY MEDICINE | Admitting: HOSPITALIST
Payer: MEDICARE

## 2022-01-19 DIAGNOSIS — R53.81 DEBILITY: ICD-10-CM

## 2022-01-19 DIAGNOSIS — N13.8 BENIGN PROSTATIC HYPERPLASIA WITH URINARY OBSTRUCTION: ICD-10-CM

## 2022-01-19 DIAGNOSIS — I69.398 ALTERED MOBILITY DUE TO OLD STROKE: ICD-10-CM

## 2022-01-19 DIAGNOSIS — R53.82 CHRONIC FATIGUE: ICD-10-CM

## 2022-01-19 DIAGNOSIS — N30.01 ACUTE CYSTITIS WITH HEMATURIA: ICD-10-CM

## 2022-01-19 DIAGNOSIS — Z85.72 H/O NON-HODGKIN'S LYMPHOMA: ICD-10-CM

## 2022-01-19 DIAGNOSIS — E86.0 DEHYDRATION: ICD-10-CM

## 2022-01-19 DIAGNOSIS — G81.94 LEFT HEMIPARESIS (HCC): ICD-10-CM

## 2022-01-19 DIAGNOSIS — I73.9 PVD (PERIPHERAL VASCULAR DISEASE) (HCC): ICD-10-CM

## 2022-01-19 DIAGNOSIS — N12 PYELONEPHRITIS: ICD-10-CM

## 2022-01-19 DIAGNOSIS — N17.9 AKI (ACUTE KIDNEY INJURY) (HCC): ICD-10-CM

## 2022-01-19 DIAGNOSIS — R62.7 FAILURE TO THRIVE IN ADULT: ICD-10-CM

## 2022-01-19 DIAGNOSIS — I48.0 PAROXYSMAL ATRIAL FIBRILLATION (HCC): ICD-10-CM

## 2022-01-19 DIAGNOSIS — N13.30 BILATERAL HYDRONEPHROSIS: ICD-10-CM

## 2022-01-19 DIAGNOSIS — I10 ESSENTIAL HYPERTENSION, BENIGN: ICD-10-CM

## 2022-01-19 DIAGNOSIS — R26.9 ALTERED MOBILITY DUE TO OLD STROKE: ICD-10-CM

## 2022-01-19 DIAGNOSIS — Z87.438 HISTORY OF PROSTATITIS: ICD-10-CM

## 2022-01-19 DIAGNOSIS — N31.9 NEUROGENIC BLADDER: ICD-10-CM

## 2022-01-19 DIAGNOSIS — N40.1 BENIGN PROSTATIC HYPERPLASIA WITH URINARY OBSTRUCTION: ICD-10-CM

## 2022-01-19 PROBLEM — N39.0 UTI (URINARY TRACT INFECTION): Status: ACTIVE | Noted: 2022-01-19

## 2022-01-19 LAB
ALBUMIN SERPL BCP-MCNC: 3.3 G/DL (ref 3.2–4.9)
ALBUMIN/GLOB SERPL: 1.3 G/DL
ALP SERPL-CCNC: 76 U/L (ref 30–99)
ALT SERPL-CCNC: 16 U/L (ref 2–50)
AMORPH CRY #/AREA URNS HPF: PRESENT /HPF
ANION GAP SERPL CALC-SCNC: 13 MMOL/L (ref 7–16)
APPEARANCE UR: ABNORMAL
APPEARANCE UR: ABNORMAL
AST SERPL-CCNC: 24 U/L (ref 12–45)
BACTERIA #/AREA URNS HPF: ABNORMAL /HPF
BACTERIA #/AREA URNS HPF: ABNORMAL /HPF
BASOPHILS # BLD AUTO: 0.3 % (ref 0–1.8)
BASOPHILS # BLD: 0.03 K/UL (ref 0–0.12)
BILIRUB SERPL-MCNC: 0.4 MG/DL (ref 0.1–1.5)
BILIRUB UR QL STRIP.AUTO: ABNORMAL
BILIRUB UR QL STRIP.AUTO: ABNORMAL
BUN SERPL-MCNC: 63 MG/DL (ref 8–22)
CALCIUM SERPL-MCNC: 8.9 MG/DL (ref 8.4–10.2)
CHLORIDE SERPL-SCNC: 99 MMOL/L (ref 96–112)
CK SERPL-CCNC: 153 U/L (ref 0–154)
CO2 SERPL-SCNC: 21 MMOL/L (ref 20–33)
COLOR UR: ABNORMAL
COLOR UR: ABNORMAL
CREAT SERPL-MCNC: 3.68 MG/DL (ref 0.5–1.4)
CRP SERPL HS-MCNC: 0.89 MG/DL (ref 0–0.75)
EOSINOPHIL # BLD AUTO: 0.25 K/UL (ref 0–0.51)
EOSINOPHIL NFR BLD: 2.3 % (ref 0–6.9)
EPI CELLS #/AREA URNS HPF: NEGATIVE /HPF
EPI CELLS #/AREA URNS HPF: NEGATIVE /HPF
ERYTHROCYTE [DISTWIDTH] IN BLOOD BY AUTOMATED COUNT: 48.6 FL (ref 35.9–50)
GLOBULIN SER CALC-MCNC: 2.6 G/DL (ref 1.9–3.5)
GLUCOSE BLD-MCNC: 122 MG/DL (ref 65–99)
GLUCOSE BLD-MCNC: 83 MG/DL (ref 65–99)
GLUCOSE SERPL-MCNC: 121 MG/DL (ref 65–99)
GLUCOSE UR STRIP.AUTO-MCNC: NEGATIVE MG/DL
GLUCOSE UR STRIP.AUTO-MCNC: NEGATIVE MG/DL
HCT VFR BLD AUTO: 40.2 % (ref 42–52)
HGB BLD-MCNC: 13 G/DL (ref 14–18)
IMM GRANULOCYTES # BLD AUTO: 0.09 K/UL (ref 0–0.11)
IMM GRANULOCYTES NFR BLD AUTO: 0.8 % (ref 0–0.9)
INR PPP: 1.13 (ref 0.87–1.13)
KETONES UR STRIP.AUTO-MCNC: NEGATIVE MG/DL
KETONES UR STRIP.AUTO-MCNC: NEGATIVE MG/DL
LACTATE BLD-SCNC: 1.3 MMOL/L (ref 0.5–2)
LACTATE BLD-SCNC: 1.6 MMOL/L (ref 0.5–2)
LACTATE BLD-SCNC: 2.2 MMOL/L (ref 0.5–2)
LEUKOCYTE ESTERASE UR QL STRIP.AUTO: ABNORMAL
LEUKOCYTE ESTERASE UR QL STRIP.AUTO: ABNORMAL
LIPASE SERPL-CCNC: 43 U/L (ref 7–58)
LYMPHOCYTES # BLD AUTO: 0.73 K/UL (ref 1–4.8)
LYMPHOCYTES NFR BLD: 6.6 % (ref 22–41)
MAGNESIUM SERPL-MCNC: 2 MG/DL (ref 1.5–2.5)
MCH RBC QN AUTO: 29 PG (ref 27–33)
MCHC RBC AUTO-ENTMCNC: 32.3 G/DL (ref 33.7–35.3)
MCV RBC AUTO: 89.5 FL (ref 81.4–97.8)
MICRO URNS: ABNORMAL
MICRO URNS: ABNORMAL
MONOCYTES # BLD AUTO: 0.5 K/UL (ref 0–0.85)
MONOCYTES NFR BLD AUTO: 4.5 % (ref 0–13.4)
NEUTROPHILS # BLD AUTO: 9.42 K/UL (ref 1.82–7.42)
NEUTROPHILS NFR BLD: 85.5 % (ref 44–72)
NITRITE UR QL STRIP.AUTO: POSITIVE
NITRITE UR QL STRIP.AUTO: POSITIVE
NRBC # BLD AUTO: 0 K/UL
NRBC BLD-RTO: 0 /100 WBC
PH UR STRIP.AUTO: 8.5 [PH] (ref 5–8)
PH UR STRIP.AUTO: >=9 [PH] (ref 5–8)
PLATELET # BLD AUTO: 354 K/UL (ref 164–446)
PMV BLD AUTO: 9.4 FL (ref 9–12.9)
POTASSIUM SERPL-SCNC: 5 MMOL/L (ref 3.6–5.5)
PROCALCITONIN SERPL-MCNC: 0.08 NG/ML
PROT SERPL-MCNC: 5.9 G/DL (ref 6–8.2)
PROT UR QL STRIP: >=1000 MG/DL
PROT UR QL STRIP: >=300 MG/DL
PROTHROMBIN TIME: 13.6 SEC (ref 12–14.6)
RBC # BLD AUTO: 4.49 M/UL (ref 4.7–6.1)
RBC # URNS HPF: ABNORMAL /HPF
RBC # URNS HPF: ABNORMAL /HPF
RBC UR QL AUTO: ABNORMAL
RBC UR QL AUTO: ABNORMAL
SARS-COV+SARS-COV-2 AG RESP QL IA.RAPID: NOTDETECTED
SODIUM SERPL-SCNC: 133 MMOL/L (ref 135–145)
SP GR UR STRIP.AUTO: 1.01
SP GR UR STRIP.AUTO: 1.01
SPECIMEN SOURCE: NORMAL
TRIPLE PHOS CRYSTAL  1767: ABNORMAL /HPF
TROPONIN T SERPL-MCNC: 50 NG/L (ref 6–19)
TROPONIN T SERPL-MCNC: 52 NG/L (ref 6–19)
UNIDENT CRYS URNS QL MICRO: ABNORMAL /HPF
UROBILINOGEN UR STRIP.AUTO-MCNC: 0.2 MG/DL
WBC # BLD AUTO: 11 K/UL (ref 4.8–10.8)
WBC #/AREA URNS HPF: ABNORMAL /HPF
WBC #/AREA URNS HPF: ABNORMAL /HPF

## 2022-01-19 PROCEDURE — 36415 COLL VENOUS BLD VENIPUNCTURE: CPT

## 2022-01-19 PROCEDURE — 87077 CULTURE AEROBIC IDENTIFY: CPT

## 2022-01-19 PROCEDURE — 83735 ASSAY OF MAGNESIUM: CPT

## 2022-01-19 PROCEDURE — 80053 COMPREHEN METABOLIC PANEL: CPT

## 2022-01-19 PROCEDURE — 83605 ASSAY OF LACTIC ACID: CPT

## 2022-01-19 PROCEDURE — 87426 SARSCOV CORONAVIRUS AG IA: CPT

## 2022-01-19 PROCEDURE — 83690 ASSAY OF LIPASE: CPT

## 2022-01-19 PROCEDURE — 82550 ASSAY OF CK (CPK): CPT

## 2022-01-19 PROCEDURE — 99285 EMERGENCY DEPT VISIT HI MDM: CPT

## 2022-01-19 PROCEDURE — 96372 THER/PROPH/DIAG INJ SC/IM: CPT

## 2022-01-19 PROCEDURE — A9270 NON-COVERED ITEM OR SERVICE: HCPCS | Performed by: HOSPITALIST

## 2022-01-19 PROCEDURE — 99220 PR INITIAL OBSERVATION CARE,LEVL III: CPT | Performed by: HOSPITALIST

## 2022-01-19 PROCEDURE — 87040 BLOOD CULTURE FOR BACTERIA: CPT

## 2022-01-19 PROCEDURE — 87086 URINE CULTURE/COLONY COUNT: CPT

## 2022-01-19 PROCEDURE — 85610 PROTHROMBIN TIME: CPT

## 2022-01-19 PROCEDURE — 71045 X-RAY EXAM CHEST 1 VIEW: CPT

## 2022-01-19 PROCEDURE — 700102 HCHG RX REV CODE 250 W/ 637 OVERRIDE(OP): Performed by: HOSPITALIST

## 2022-01-19 PROCEDURE — 82962 GLUCOSE BLOOD TEST: CPT | Mod: 91

## 2022-01-19 PROCEDURE — 700111 HCHG RX REV CODE 636 W/ 250 OVERRIDE (IP): Performed by: EMERGENCY MEDICINE

## 2022-01-19 PROCEDURE — 93005 ELECTROCARDIOGRAM TRACING: CPT | Performed by: EMERGENCY MEDICINE

## 2022-01-19 PROCEDURE — 700105 HCHG RX REV CODE 258: Performed by: HOSPITALIST

## 2022-01-19 PROCEDURE — 87186 SC STD MICRODIL/AGAR DIL: CPT

## 2022-01-19 PROCEDURE — 85025 COMPLETE CBC W/AUTO DIFF WBC: CPT

## 2022-01-19 PROCEDURE — 84484 ASSAY OF TROPONIN QUANT: CPT

## 2022-01-19 PROCEDURE — 700105 HCHG RX REV CODE 258: Performed by: EMERGENCY MEDICINE

## 2022-01-19 PROCEDURE — 84145 PROCALCITONIN (PCT): CPT

## 2022-01-19 PROCEDURE — 74176 CT ABD & PELVIS W/O CONTRAST: CPT | Mod: MG

## 2022-01-19 PROCEDURE — 86140 C-REACTIVE PROTEIN: CPT

## 2022-01-19 PROCEDURE — 81001 URINALYSIS AUTO W/SCOPE: CPT

## 2022-01-19 PROCEDURE — G0378 HOSPITAL OBSERVATION PER HR: HCPCS

## 2022-01-19 RX ORDER — LOSARTAN POTASSIUM 25 MG/1
50 TABLET ORAL 2 TIMES DAILY
Status: DISCONTINUED | OUTPATIENT
Start: 2022-01-20 | End: 2022-01-22 | Stop reason: HOSPADM

## 2022-01-19 RX ORDER — VITAMIN B COMPLEX
2000 TABLET ORAL DAILY
Status: DISCONTINUED | OUTPATIENT
Start: 2022-01-20 | End: 2022-01-22 | Stop reason: HOSPADM

## 2022-01-19 RX ORDER — TAMSULOSIN HYDROCHLORIDE 0.4 MG/1
0.4 CAPSULE ORAL DAILY
Status: DISCONTINUED | OUTPATIENT
Start: 2022-01-20 | End: 2022-01-22 | Stop reason: HOSPADM

## 2022-01-19 RX ORDER — POLYETHYLENE GLYCOL 3350 17 G/17G
1 POWDER, FOR SOLUTION ORAL
Status: DISCONTINUED | OUTPATIENT
Start: 2022-01-19 | End: 2022-01-22 | Stop reason: HOSPADM

## 2022-01-19 RX ORDER — ACETAMINOPHEN 500 MG
1000 TABLET ORAL EVERY 6 HOURS PRN
Status: ON HOLD | COMMUNITY
End: 2022-05-31

## 2022-01-19 RX ORDER — SODIUM CHLORIDE 9 MG/ML
1000 INJECTION, SOLUTION INTRAVENOUS ONCE
Status: COMPLETED | OUTPATIENT
Start: 2022-01-19 | End: 2022-01-19

## 2022-01-19 RX ORDER — SODIUM CHLORIDE, SODIUM LACTATE, POTASSIUM CHLORIDE, AND CALCIUM CHLORIDE .6; .31; .03; .02 G/100ML; G/100ML; G/100ML; G/100ML
500 INJECTION, SOLUTION INTRAVENOUS
Status: DISCONTINUED | OUTPATIENT
Start: 2022-01-19 | End: 2022-01-22 | Stop reason: HOSPADM

## 2022-01-19 RX ORDER — CLOPIDOGREL BISULFATE 75 MG/1
75 TABLET ORAL NIGHTLY
Status: DISCONTINUED | OUTPATIENT
Start: 2022-01-19 | End: 2022-01-22 | Stop reason: HOSPADM

## 2022-01-19 RX ORDER — CEFTRIAXONE 2 G/1
2 INJECTION, POWDER, FOR SOLUTION INTRAMUSCULAR; INTRAVENOUS ONCE
Status: COMPLETED | OUTPATIENT
Start: 2022-01-19 | End: 2022-01-19

## 2022-01-19 RX ORDER — METOPROLOL SUCCINATE 100 MG/1
100 TABLET, EXTENDED RELEASE ORAL NIGHTLY
Status: DISCONTINUED | OUTPATIENT
Start: 2022-01-19 | End: 2022-01-22 | Stop reason: HOSPADM

## 2022-01-19 RX ORDER — OMEPRAZOLE 20 MG/1
20 CAPSULE, DELAYED RELEASE ORAL DAILY
Status: DISCONTINUED | OUTPATIENT
Start: 2022-01-20 | End: 2022-01-22 | Stop reason: HOSPADM

## 2022-01-19 RX ORDER — SODIUM CHLORIDE 9 MG/ML
1000 INJECTION, SOLUTION INTRAVENOUS CONTINUOUS
Status: ACTIVE | OUTPATIENT
Start: 2022-01-19 | End: 2022-01-20

## 2022-01-19 RX ORDER — CHOLECALCIFEROL (VITAMIN D3) 50 MCG
2000 TABLET ORAL DAILY
Status: ON HOLD | COMMUNITY
End: 2022-05-31

## 2022-01-19 RX ORDER — BISACODYL 10 MG
10 SUPPOSITORY, RECTAL RECTAL
Status: DISCONTINUED | OUTPATIENT
Start: 2022-01-19 | End: 2022-01-22 | Stop reason: HOSPADM

## 2022-01-19 RX ORDER — SODIUM CHLORIDE 9 MG/ML
1000 INJECTION, SOLUTION INTRAVENOUS CONTINUOUS
Status: DISCONTINUED | OUTPATIENT
Start: 2022-01-19 | End: 2022-01-19

## 2022-01-19 RX ORDER — GABAPENTIN 100 MG/1
200 CAPSULE ORAL
Status: DISCONTINUED | OUTPATIENT
Start: 2022-01-19 | End: 2022-01-22 | Stop reason: HOSPADM

## 2022-01-19 RX ORDER — FLUOXETINE HYDROCHLORIDE 20 MG/1
40 CAPSULE ORAL DAILY
Refills: 3 | Status: DISCONTINUED | OUTPATIENT
Start: 2022-01-20 | End: 2022-01-22 | Stop reason: HOSPADM

## 2022-01-19 RX ORDER — ATORVASTATIN CALCIUM 40 MG/1
80 TABLET, FILM COATED ORAL EVERY EVENING
Status: DISCONTINUED | OUTPATIENT
Start: 2022-01-19 | End: 2022-01-22 | Stop reason: HOSPADM

## 2022-01-19 RX ORDER — FINASTERIDE 5 MG/1
5 TABLET, FILM COATED ORAL NIGHTLY
Status: DISCONTINUED | OUTPATIENT
Start: 2022-01-19 | End: 2022-01-22 | Stop reason: HOSPADM

## 2022-01-19 RX ORDER — DEXTROSE MONOHYDRATE 25 G/50ML
50 INJECTION, SOLUTION INTRAVENOUS
Status: DISCONTINUED | OUTPATIENT
Start: 2022-01-19 | End: 2022-01-22 | Stop reason: HOSPADM

## 2022-01-19 RX ORDER — ACETAMINOPHEN 325 MG/1
650 TABLET ORAL EVERY 6 HOURS PRN
Status: DISCONTINUED | OUTPATIENT
Start: 2022-01-19 | End: 2022-01-22 | Stop reason: HOSPADM

## 2022-01-19 RX ORDER — HEPARIN SODIUM 5000 [USP'U]/ML
5000 INJECTION, SOLUTION INTRAVENOUS; SUBCUTANEOUS EVERY 8 HOURS
Status: DISCONTINUED | OUTPATIENT
Start: 2022-01-19 | End: 2022-01-22 | Stop reason: HOSPADM

## 2022-01-19 RX ORDER — SODIUM CHLORIDE, SODIUM LACTATE, POTASSIUM CHLORIDE, AND CALCIUM CHLORIDE .6; .31; .03; .02 G/100ML; G/100ML; G/100ML; G/100ML
30 INJECTION, SOLUTION INTRAVENOUS
Status: DISCONTINUED | OUTPATIENT
Start: 2022-01-19 | End: 2022-01-22 | Stop reason: HOSPADM

## 2022-01-19 RX ORDER — AMLODIPINE BESYLATE 5 MG/1
5 TABLET ORAL DAILY
Status: DISCONTINUED | OUTPATIENT
Start: 2022-01-20 | End: 2022-01-22 | Stop reason: HOSPADM

## 2022-01-19 RX ORDER — ALLOPURINOL 100 MG/1
100 TABLET ORAL NIGHTLY
Status: DISCONTINUED | OUTPATIENT
Start: 2022-01-19 | End: 2022-01-22 | Stop reason: HOSPADM

## 2022-01-19 RX ORDER — SULFAMETHOXAZOLE AND TRIMETHOPRIM 400; 80 MG/1; MG/1
1 TABLET ORAL 2 TIMES DAILY
Status: ON HOLD | COMMUNITY
End: 2022-01-22

## 2022-01-19 RX ADMIN — SODIUM CHLORIDE 1000 ML: 9 INJECTION, SOLUTION INTRAVENOUS at 15:02

## 2022-01-19 RX ADMIN — GABAPENTIN 200 MG: 100 CAPSULE ORAL at 21:03

## 2022-01-19 RX ADMIN — CLOPIDOGREL BISULFATE 75 MG: 75 TABLET ORAL at 21:03

## 2022-01-19 RX ADMIN — METOPROLOL SUCCINATE 100 MG: 100 TABLET, EXTENDED RELEASE ORAL at 21:03

## 2022-01-19 RX ADMIN — ACETAMINOPHEN 650 MG: 325 TABLET, FILM COATED ORAL at 17:20

## 2022-01-19 RX ADMIN — FINASTERIDE 5 MG: 5 TABLET, FILM COATED ORAL at 21:03

## 2022-01-19 RX ADMIN — ATORVASTATIN CALCIUM 80 MG: 40 TABLET, FILM COATED ORAL at 21:07

## 2022-01-19 RX ADMIN — INSULIN GLARGINE 24 UNITS: 100 INJECTION, SOLUTION SUBCUTANEOUS at 21:19

## 2022-01-19 RX ADMIN — SODIUM CHLORIDE 1000 ML: 9 INJECTION, SOLUTION INTRAVENOUS at 18:40

## 2022-01-19 RX ADMIN — ALLOPURINOL 100 MG: 100 TABLET ORAL at 21:03

## 2022-01-19 RX ADMIN — CEFTRIAXONE SODIUM 2 G: 2 INJECTION, POWDER, FOR SOLUTION INTRAMUSCULAR; INTRAVENOUS at 15:17

## 2022-01-19 ASSESSMENT — ENCOUNTER SYMPTOMS
STRIDOR: 0
EYE DISCHARGE: 0
VOMITING: 0
FLANK PAIN: 0
COUGH: 0
EYE REDNESS: 0
NERVOUS/ANXIOUS: 0
SHORTNESS OF BREATH: 0
BRUISES/BLEEDS EASILY: 0
CHILLS: 0
ABDOMINAL PAIN: 0
MYALGIAS: 0
FOCAL WEAKNESS: 0
FEVER: 0

## 2022-01-19 ASSESSMENT — FIBROSIS 4 INDEX: FIB4 SCORE: 1.02

## 2022-01-19 ASSESSMENT — PAIN DESCRIPTION - PAIN TYPE: TYPE: ACUTE PAIN

## 2022-01-19 NOTE — ED NOTES
Med rec updated and complete, per wife had a list of medications that was returned back to wife at bedside.   Allergies reviewed, per wife  Interviewed pt with wife at bedside with permission from pt.

## 2022-01-19 NOTE — TELEPHONE ENCOUNTER
1. Caller Name: Usha Bob                        Call Back Number: [phone]      How would the patient prefer to be contacted with a response: Phone call OK to leave a detailed message    Called and spoke to Usha wife of patient. Dr. Bunch is out today but saw the labs and with his increasing weakness Dr. Bunch wants Av to go to ER and get better care with an admission most likely happening     When I spoke to Usha Ley was not up yet.  She said she would go to [facility]

## 2022-01-19 NOTE — ED NOTES
UA collected by straight cath and sent to lab. 300cc bloody urine out, no clots noted. Incontinence care provided prior to cath, new brief placed.

## 2022-01-19 NOTE — ED PROVIDER NOTES
ED Provider Note    CHIEF COMPLAINT  Chief Complaint   Patient presents with   • Weakness     Visitor states is getting progressively more weak   • Abnormal Labs     Visitor states received a call from PCP this am for abnormal Creat level       HPI    Primary care provider: Madison Bunch D.O.  Means of arrival: POV  History obtained from: Patient and wife and records review  History limited by: Nothing    Av Bob is a 76 y.o. male who presents with generalized weakness.  Present for weeks.  Patient has been dealing with prostatitis for more than a month, been seen by his PCP and urology [place].  He is currently on a course of Bactrim.  Has been compliant with it.  Only medical complaints are generalized weakness he is weaker than usual, he has some debility at baseline because he had an ischemic stroke 5 years ago and has some left-sided deficits.  Saw PCP yesterday, got called today because he had worsening kidney function so they instructed patient and wife to come in for emergent reevaluation.  No falls or injuries or trauma.  No chest pain or cough or dyspnea.  The patient has been fully vaccinated and boosted against COVID-19.  No aggravating or alleviating factors noted.    REVIEW OF SYSTEMS  Constitutional: Negative for fever or chills.  Positive for fatigue and generalized weakness.  Respiratory: Negative for cough or shortness of breath.    Cardiovascular: Negative for chest pain or palpitations.   Gastrointestinal: Negative for nausea, vomiting, or abdominal pain.   Genitourinary: Negative for dysuria or flank pain.   Musculoskeletal: Negative for back pain or joint pain.   Skin: Negative for itching or rash.   Neurological: Negative for sensory or motor changes.   See HPI for further details. All other systems are negative.     PAST MEDICAL HISTORY   has a past medical history of (HCC) Chronic ulcer of right lower extremity with fat layer exposed (12/27/2018), Acute on chronic renal  failure ([facility]) (1/21/2020), Acute respiratory failure with hypoxia ([facility]) (5/20/2017), CAD (coronary artery disease), Cancer ([facility]), Cataract, Cerebrovascular accident (CVA) ([facility]) (12/30/2016), Chickenpox, CKD (chronic kidney disease) stage 3, GFR 30-59 ml/min ([facility]), Controlled gout (2014), Coronary atherosclerosis of native coronary artery, Daytime sleepiness, Depression, Diabetes ([facility]), Difficulty swallowing, Enterococcal septicemia ([facility]) (8/12/2017), Roberto hematuria (1/29/2020), Frequent urination, GERD (gastroesophageal reflux disease), Austrian measles, Hypertension (06/30/2021), Hypokalemia (2012), Hypomagnesemia (08/12/2017), Influenza A (1/26/2020), Insomnia, Kidney stone, Leg edema, right (1/22/2020), Lymphoma ([facility]) (2/19/2017), Mixed hyperlipidemia, Nephrolithiasis (2006), Normocytic hypochromic anemia (5/20/2017), NSTEMI (non-ST elevated myocardial infarction) ([facility]) (07/18/2017), Pain (06/30/2021), Peripheral vascular disease ([facility]), Polyneuropathy in diabetes(357.2) (9/11/2013), Renal mass (1/21/2020), Septic shock ([facility]) (5/20/2017), Skin ulcer of calf ([facility]) (2015), Stroke ([facility]) (2016), Urinary bladder disorder, Urinary incontinence, Weakness, and Wound of left leg (2012).    PAST FAMILY HISTORY  Family History   Problem Relation Age of Onset   • Heart Disease Father         CAD   • Diabetes Father    • Cancer Mother    • Psychiatric Illness Mother         Depression   • Depression Mother    • Kidney stones Brother    • Heart Disease Brother    • Psychiatric Illness Brother         Depression   • Depression Brother    • Suicide Attempts Other    • Psychiatric Illness Other         autism       SOCIAL HISTORY  Social History     Tobacco Use   • Smoking status: Never Smoker   • Smokeless tobacco: Never Used   Vaping Use   • Vaping Use: Never used   Substance and Sexual Activity   • Alcohol use: Never   • Drug use: Never   • Sexual activity: Not Currently     Partners: Female     Comment: , one daughter, 2  grands       SURGICAL HISTORY   has a past surgical history that includes lithotripsy; angioplasty (1997); wound closure general (4/3/2012); tonsillectomy and adenoidectomy; cataract extraction with iol; solo by laparoscopy (1998); cystoscopy stent placement (Left, 2/12/2018); ureteroscopy (Left, 2/12/2018); lasertripsy (Left, 2/12/2018); [facility] cardiac cath (1997); [facility] cardiac cath (2009); tonsillectomy; lumbar transforaminal epidural steroid injection (Right, 11/2/2020); lumbar transforaminal epidural steroid injection (Right, 3/29/2021); upper gi endoscopy,diagnosis (N/A, 7/21/2021); upper gi endoscopy,biopsy (N/A, 7/21/2021); and inj lumbar/sacral,w/ imaging (7/27/2021).    CURRENT MEDICATIONS  Home Medications     Reviewed by Norbert Cummings (Pharmacy Tech) on 01/19/22 at 1412  Med List Status: Complete   Medication Last Dose Status   acetaminophen (TYLENOL) 500 MG Tab 1/18/2022 Active   alfuzosin (UROXATRAL) 10 MG SR tablet 1/19/2022 Active   allopurinol (ZYLOPRIM) 100 MG Tab 1/18/2022 Active   amLODIPine (NORVASC) 5 MG Tab 1/19/2022 Active   aspirin EC (ECOTRIN) 81 MG Tablet Delayed Response 1/19/2022 Active   atorvastatin (LIPITOR) 80 MG tablet 1/18/2022 Active   Cholecalciferol (D3) 2000 UNIT Tab 1/19/2022 Active   clopidogrel (PLAVIX) 75 MG Tab 1/18/2022 Active   Dulaglutide (TRULICITY) 3 MG/0.5ML Solution Pen-injector 1/15/2022 Active   fenofibrate (TRICOR) 145 MG Tab 1/18/2022 Active   finasteride (PROSCAR) 5 MG Tab 1/18/2022 Active   fluoxetine (PROZAC) 40 MG capsule 1/19/2022 Active   gabapentin (NEURONTIN) 100 MG Cap 1/18/2022 Active   Insulin Glargine, 1 Unit Dial, (TOUJEO SOLOSTAR) 300 UNIT/ML Solution Pen-injector 1/18/2022 Active   insulin lispro (ADMELOG SOLOSTAR) 100 UNIT/ML Solution Pen-injector injection PEN 1/18/2022 Active   losartan (COZAAR) 50 MG Tab 1/19/2022 Active   magnesium oxide (MAG-OX) 400 MG Tab 1/19/2022 Active   methenamine hip (HIPPREX) 1 GM Tab 1/19/2022 Active  "  metoprolol SR (TOPROL XL) 100 MG TABLET SR 24 HR 1/18/2022 Active   Multiple Vitamin (MULTI VITAMIN MENS PO) 1/19/2022 Active   omeprazole (PRILOSEC) 20 MG delayed-release capsule 1/19/2022 Active   potassium chloride (KLOR-CON) 8 MEQ tablet 1/6/2022 Active   Probiotic Product (PROBIOTIC DAILY PO) 1/19/2022 Active   sulfamethoxazole-trimethoprim (BACTRIM) 400-80 MG Tab 1/19/2022 Active                ALLERGIES  Allergies   Allergen Reactions   • Diphenhydramine Hcl Anxiety     Pt is able to tolerate  Mg benadryl with less anxiety   • Lorazepam Unspecified     Disorientation   • Ciprofloxacin      Rash,stomach ache   • Spironolactone      Acute kidney injury       PHYSICAL EXAM  VITAL SIGNS: /62   Pulse 70   Temp 36.2 °C (97.2 °F) (Oral)   Resp 18   Ht 1.778 m (5' 10\")   Wt 94.2 kg (207 lb 10.8 oz)   SpO2 98%   BMI 29.80 kg/m²    Pulse ox interpretation: On room air, I interpret this pulse ox as normal.  Constitutional: Age-appropriate appearing lying on the stretcher in mild distress.  HEENT: Normocephalic, atraumatic. Posterior pharynx clear, mucous membranes m dry oist.  Eyes:  EOMI. Normal sclerae.  Pale conjunctivae.  Neck: Supple, nontender.  Chest/Pulmonary: Slightly diminished to ausculation bilaterally, no wheezes or rhonchi.  Cardiovascular: Regular rate and rhythm, subtle systolic murmur.   Abdomen: Soft, nontender; no rebound, guarding, or masses.  : Prostate is firm and smooth, no bogginess.  Nontender.  Back: No CVA or midline tenderness.   Musculoskeletal: No deformity or edema.  Neuro: Contracted left upper extremity, per wife and patient this is baseline.  No facial asymmetry.  Psych: Normal mood and affect.  Skin: No rashes, warm and dry.      DIAGNOSTIC STUDIES / PROCEDURES    LABS & EKG  Results for orders placed or performed during the hospital encounter of 01/19/22   CBC WITH DIFFERENTIAL   Result Value Ref Range    WBC 11.0 (H) 4.8 - 10.8 K/uL    RBC 4.49 (L) 4.70 - 6.10 M/uL "    Hemoglobin 13.0 (L) 14.0 - 18.0 g/dL    Hematocrit 40.2 (L) 42.0 - 52.0 %    MCV 89.5 81.4 - 97.8 fL    MCH 29.0 27.0 - 33.0 pg    MCHC 32.3 (L) 33.7 - 35.3 g/dL    RDW 48.6 35.9 - 50.0 fL    Platelet Count 354 164 - 446 K/uL    MPV 9.4 9.0 - 12.9 fL    Neutrophils-Polys 85.50 (H) 44.00 - 72.00 %    Lymphocytes 6.60 (L) 22.00 - 41.00 %    Monocytes 4.50 0.00 - 13.40 %    Eosinophils 2.30 0.00 - 6.90 %    Basophils 0.30 0.00 - 1.80 %    Immature Granulocytes 0.80 0.00 - 0.90 %    Nucleated RBC 0.00 /100 WBC    Neutrophils (Absolute) 9.42 (H) 1.82 - 7.42 K/uL    Lymphs (Absolute) 0.73 (L) 1.00 - 4.80 K/uL    Monos (Absolute) 0.50 0.00 - 0.85 K/uL    Eos (Absolute) 0.25 0.00 - 0.51 K/uL    Baso (Absolute) 0.03 0.00 - 0.12 K/uL    Immature Granulocytes (abs) 0.09 0.00 - 0.11 K/uL    NRBC (Absolute) 0.00 K/uL   COMP METABOLIC PANEL   Result Value Ref Range    Sodium 133 (L) 135 - 145 mmol/L    Potassium 5.0 3.6 - 5.5 mmol/L    Chloride 99 96 - 112 mmol/L    Co2 21 20 - 33 mmol/L    Anion Gap 13.0 7.0 - 16.0    Glucose 121 (H) 65 - 99 mg/dL    Bun 63 (H) 8 - 22 mg/dL    Creatinine 3.68 (H) 0.50 - 1.40 mg/dL    Calcium 8.9 8.4 - 10.2 mg/dL    AST(SGOT) 24 12 - 45 U/L    ALT(SGPT) 16 2 - 50 U/L    Alkaline Phosphatase 76 30 - 99 U/L    Total Bilirubin 0.4 0.1 - 1.5 mg/dL    Albumin 3.3 3.2 - 4.9 g/dL    Total Protein 5.9 (L) 6.0 - 8.2 g/dL    Globulin 2.6 1.9 - 3.5 g/dL    A-G Ratio 1.3 g/dL   LIPASE   Result Value Ref Range    Lipase 43 7 - 58 U/L   ESTIMATED GFR   Result Value Ref Range    GFR If  20 (A) >60 mL/min/1.73 m 2    GFR If Non  16 (A) >60 mL/min/1.73 m 2   MAGNESIUM   Result Value Ref Range    Magnesium 2.0 1.5 - 2.5 mg/dL   CREATINE KINASE   Result Value Ref Range    CPK Total 153 0 - 154 U/L   LACTIC ACID   Result Value Ref Range    Lactic Acid 2.2 (H) 0.5 - 2.0 mmol/L   LACTIC ACID   Result Value Ref Range    Lactic Acid 1.6 0.5 - 2.0 mmol/L   TROPONIN   Result Value Ref  Range    Troponin T 52 (H) 6 - 19 ng/L   PROTHROMBIN TIME   Result Value Ref Range    PT 13.6 12.0 - 14.6 sec    INR 1.13 0.87 - 1.13   SARS-COV Antigen ROBIN: Collect dry nasal swab   Result Value Ref Range    SARS-CoV-2 Source Nasal Swab     SARS-COV ANTIGEN ROBIN NotDetected NotDetected   CRP Quantitative (Non-Cardiac)   Result Value Ref Range    Stat C-Reactive Protein 0.89 (H) 0.00 - 0.75 mg/dL   Procalcitonin   Result Value Ref Range    Procalcitonin 0.08 <0.25 ng/mL   URINALYSIS   Result Value Ref Range    Color Red (A)     Character Turbid (A)     Specific Gravity 1.010 <1.035    Ph 8.5 (A) 5.0 - 8.0    Glucose Negative Negative mg/dL    Ketones Negative Negative mg/dL    Protein >=300 (A) Negative mg/dL    Bilirubin Small (A) Negative    Nitrite Positive (A) Negative    Leukocyte Esterase Large (A) Negative    Occult Blood Large (A) Negative    Micro Urine Req Microscopic    URINE MICROSCOPIC (W/UA)   Result Value Ref Range    -150 (A) /hpf    RBC 2-5 (A) /hpf    Bacteria Moderate (A) None /hpf    Epithelial Cells Negative Few /hpf    Urine Crystals Few Amorphous /hpf    Triple Phos Crystal Rare /hpf   TROPONIN   Result Value Ref Range    Troponin T 50 (H) 6 - 19 ng/L   Lactic Acid Every four hours after STAT order   Result Value Ref Range    Lactic Acid 1.3 0.5 - 2.0 mmol/L   Lactic Acid Every four hours after STAT order   Result Value Ref Range    Lactic Acid 0.8 0.5 - 2.0 mmol/L   Comp Metabolic Panel   Result Value Ref Range    Sodium 137 135 - 145 mmol/L    Potassium 4.6 3.6 - 5.5 mmol/L    Chloride 110 96 - 112 mmol/L    Co2 18 (L) 20 - 33 mmol/L    Anion Gap 9.0 7.0 - 16.0    Glucose 80 65 - 99 mg/dL    Bun 61 (H) 8 - 22 mg/dL    Creatinine 3.02 (H) 0.50 - 1.40 mg/dL    Calcium 8.3 (L) 8.4 - 10.2 mg/dL    AST(SGOT) 27 12 - 45 U/L    ALT(SGPT) 17 2 - 50 U/L    Alkaline Phosphatase 70 30 - 99 U/L    Total Bilirubin 0.3 0.1 - 1.5 mg/dL    Albumin 3.0 (L) 3.2 - 4.9 g/dL    Total Protein 5.2 (L) 6.0 -  8.2 g/dL    Globulin 2.2 1.9 - 3.5 g/dL    A-G Ratio 1.4 g/dL   CBC WITH DIFFERENTIAL   Result Value Ref Range    WBC 9.0 4.8 - 10.8 K/uL    RBC 4.13 (L) 4.70 - 6.10 M/uL    Hemoglobin 11.8 (L) 14.0 - 18.0 g/dL    Hematocrit 36.5 (L) 42.0 - 52.0 %    MCV 88.4 81.4 - 97.8 fL    MCH 28.6 27.0 - 33.0 pg    MCHC 32.3 (L) 33.7 - 35.3 g/dL    RDW 48.7 35.9 - 50.0 fL    Platelet Count 304 164 - 446 K/uL    MPV 9.7 9.0 - 12.9 fL    Neutrophils-Polys 81.40 (H) 44.00 - 72.00 %    Lymphocytes 9.80 (L) 22.00 - 41.00 %    Monocytes 6.10 0.00 - 13.40 %    Eosinophils 2.10 0.00 - 6.90 %    Basophils 0.20 0.00 - 1.80 %    Immature Granulocytes 0.40 0.00 - 0.90 %    Nucleated RBC 0.00 /100 WBC    Neutrophils (Absolute) 7.31 1.82 - 7.42 K/uL    Lymphs (Absolute) 0.88 (L) 1.00 - 4.80 K/uL    Monos (Absolute) 0.55 0.00 - 0.85 K/uL    Eos (Absolute) 0.19 0.00 - 0.51 K/uL    Baso (Absolute) 0.02 0.00 - 0.12 K/uL    Immature Granulocytes (abs) 0.04 0.00 - 0.11 K/uL    NRBC (Absolute) 0.00 K/uL   MAGNESIUM   Result Value Ref Range    Magnesium 1.8 1.5 - 2.5 mg/dL   ESTIMATED GFR   Result Value Ref Range    GFR If African American 25 (A) >60 mL/min/1.73 m 2    GFR If Non African American 20 (A) >60 mL/min/1.73 m 2   EKG   Result Value Ref Range    Report       [facility]    Test Date:  2022  Pt Name:    ELIGIO MADRIGAL              Department: [facility]  MRN:        [id number]                      Room:       3312  Gender:     Male                         Technician: MARLIN  :        1945                   Requested By:HOLA LOCKWOOD  Order #:    [id number]                    Reading MD: Hola Lockwood MD    Measurements  Intervals                                Axis  Rate:       68                           P:          7  NV:         129                          QRS:        30  QRSD:       118                          T:          33  QT:         448  QTc:        477    Interpretive  Statements  Sinus rhythm  Nonspecific intraventricular conduction delay  Low voltage, extremity leads  Compared to ECG 06/30/2021 13:58:54  T-wave abnormality no longer present  No STEMI  Electronically Signed On 1- 14:05:51 PST by Hola Roman MD     POCT glucose device results   Result Value Ref Range    Glucose - Accu-Ck 83 65 - 99 mg/dL   POCT glucose device results   Result Value Ref Range    Glucose - Accu-Ck 122 (H) 65 - 99 mg/dL   POCT glucose device results   Result Value Ref Range    Glucose - Accu-Ck 69 65 - 99 mg/dL   POCT glucose device results   Result Value Ref Range    Glucose - Accu-Ck 122 (H) 65 - 99 mg/dL   POCT glucose device results   Result Value Ref Range    Glucose - Accu-Ck 104 (H) 65 - 99 mg/dL     *Note: Due to a large number of results and/or encounters for the requested time period, some results have not been displayed. A complete set of results can be found in Results Review.       RADIOLOGY  CT-RENAL COLIC EVALUATION(A/P W/O)   Final Result      1.  Again seen severe bilateral hydronephrosis extending to the level of the urinary bladder. There is some dense material layering dependently within the renal calyces as well as within the distal left ureter and the urinary bladder which may represent    previously administered iodinated contrast agent.      2.  Bilateral diffuse renal cortical thinning.      3.  Asymmetric left urinary bladder wall thickening which is somewhat lobulated and could potentially represent neoplastic process.      4.  Extensive atherosclerotic vascular calcification.      5.  No evidence of bowel obstruction or focal inflammatory change.      DX-CHEST-PORTABLE (1 VIEW)   Final Result         1. No acute cardiopulmonary abnormalities are identified.          COURSE & MEDICAL DECISION MAKING    This is a 76 y.o. male who presents with generalized fatigue, recently being treated with Bactrim for prostatitis, globally weak.  Told to come in for abnormal  outpatient labs.    Differential Diagnosis includes but is not limited to:  CATA, obstruction, pyelonephritis, prostatitis, bacteremia, electrolyte abnormality, sepsis    ED Course:  76-year-old male with above concerning presentation.  Plan labs imaging and n.p.o. until surgical process ruled out, will empirically cover with ceftriaxone and treat with crystalloid fluid bolus because he looks dehydrated.    Concern for signs of kidney injury.  The patient also has markers of infection on urinalysis.  White count is 11 I will empirically cover with ceftriaxone he has been on Bactrim outpatient.  Advanced imaging shows bilateral hydronephrosis without any obstructing stones, discussed with urologist Dr. Ogden who recommends Michaels catheter placement.  Patient and wife updated at the bedside, hospitalist Dr. Chun consulted and they will kindly admit the patient for further work-up and reviewed.  The patient is hemodynamically stable for admission in guarded condition.  Feeling better after IV fluids and medications.    Medications   tamsulosin (FLOMAX) capsule 0.4 mg (0.4 mg Oral Given 1/20/22 0618)   allopurinol (ZYLOPRIM) tablet 100 mg (100 mg Oral Given 1/19/22 2103)   amLODIPine (NORVASC) tablet 5 mg (0 mg Oral Held 1/20/22 0617)   aspirin EC (ECOTRIN) tablet 81 mg (81 mg Oral Given 1/20/22 0616)   atorvastatin (LIPITOR) tablet 80 mg (80 mg Oral Given 1/19/22 2107)   vitamin D3 (cholecalciferol) tablet 2,000 Units (2,000 Units Oral Given 1/20/22 0617)   clopidogrel (PLAVIX) tablet 75 mg (75 mg Oral Given 1/19/22 2103)   finasteride (PROSCAR) tablet 5 mg (5 mg Oral Given 1/19/22 2103)   FLUoxetine (PROZAC) capsule 40 mg (40 mg Oral Given 1/20/22 0618)   gabapentin (NEURONTIN) capsule 200 mg (200 mg Oral Given 1/19/22 2103)   insulin GLARGINE (Lantus,Semglee) injection (24 Units Subcutaneous Given 1/19/22 2119)   losartan (COZAAR) tablet 50 mg ( Oral Automatically Held 1/27/22 1800)   metoprolol SR (TOPROL XL)  tablet 100 mg (100 mg Oral Given 1/19/22 2103)   omeprazole (PRILOSEC) capsule 20 mg (20 mg Oral Given 1/20/22 0617)   cefTRIAXone (Rocephin) 2 g in  mL IVPB (0 g Intravenous Stopped 1/20/22 0651)   acetaminophen (Tylenol) tablet 650 mg (650 mg Oral Given 1/20/22 1334)   polyethylene glycol/lytes (MIRALAX) PACKET 1 Packet (has no administration in time range)     And   magnesium hydroxide (MILK OF MAGNESIA) suspension 30 mL (has no administration in time range)     And   bisacodyl (DULCOLAX) suppository 10 mg (has no administration in time range)   lactated ringers infusion (BOLUS): BMI less than or equal to 30 (has no administration in time range)   lactated ringers infusion (BOLUS) (has no administration in time range)   heparin injection 5,000 Units ( Subcutaneous Automatically Held 1/26/22 2200)   insulin regular (HumuLIN R,NovoLIN R) injection (0 Units Subcutaneous Held 1/20/22 1100)     And   dextrose 50% (D50W) injection 50 mL (has no administration in time range)   NS infusion 1,000 mL (1,000 mL Intravenous New Bag 1/19/22 1840)   NS (BOLUS) infusion 1,000 mL (0 mL Intravenous Stopped 1/19/22 1700)   cefTRIAXone (Rocephin) injection 2 g (2 g Intravenous Given 1/19/22 1517)       FINAL IMPRESSION  1. CATA (acute kidney injury) (HCC)    2. Failure to thrive in adult    3. Dehydration    4. Debility    5. Chronic fatigue    6. History of prostatitis    7. Pyelonephritis    8. Bilateral hydronephrosis    9. Essential hypertension, benign    10. Altered mobility due to old stroke    11. Neurogenic bladder    12. PVD (peripheral vascular disease) (HCC)    13. (HCC) Left hemiparesis    14. Paroxysmal atrial fibrillation (HCC)    15. H/O non-Hodgkin's lymphoma        -ADMIT-       Pertinent Labs & Imaging studies reviewed and verified by myself, as well as nursing notes and the patient's past medical, family, and social histories (See chart for details).    Portions of this record were made with voice  recognition software.  Despite my review, spelling/grammar/context errors may still remain.  Interpretation of this chart should be taken in this context.    Electronically signed by Hola Roman M.D. on 1/20/2022 at 2:07 PM.

## 2022-01-19 NOTE — ED TRIAGE NOTES
"Chief Complaint   Patient presents with   • Weakness     Visitor states is getting progressively more weak   • Abnormal Labs     Visitor states received a call from PCP this am for abnormal Creat level     /65   Pulse 75   Temp 36.4 °C (97.6 °F) (Temporal)   Resp 14   Ht 1.778 m (5' 10\")   Wt 93 kg (205 lb 0.4 oz)   SpO2 97%   BMI 29.42 kg/m²     Has this patient been vaccinated for COVID YES  If not, would they like to be vaccinated while in the ER if eligible?  na  Would the patient like to speak with the ERP about the possibility of receiving the COVID vaccine today before making a decision? Na    Pt to ED via WC w/ visitor for c/o abnormal labs and weakness.  Visitor states told Creatinine levels were high and to come to ED for further workup.    Pt currently being treated for Prostatitis.      "

## 2022-01-19 NOTE — ED NOTES
ERP made aware of unsuccessful attempts to drawn first set of cultures, per ERP hold abx until first set drawn. Lab to come draw.

## 2022-01-20 LAB
ALBUMIN SERPL BCP-MCNC: 3 G/DL (ref 3.2–4.9)
ALBUMIN/GLOB SERPL: 1.4 G/DL
ALP SERPL-CCNC: 70 U/L (ref 30–99)
ALT SERPL-CCNC: 17 U/L (ref 2–50)
ANION GAP SERPL CALC-SCNC: 9 MMOL/L (ref 7–16)
AST SERPL-CCNC: 27 U/L (ref 12–45)
BASOPHILS # BLD AUTO: 0.2 % (ref 0–1.8)
BASOPHILS # BLD: 0.02 K/UL (ref 0–0.12)
BILIRUB SERPL-MCNC: 0.3 MG/DL (ref 0.1–1.5)
BUN SERPL-MCNC: 61 MG/DL (ref 8–22)
CALCIUM SERPL-MCNC: 8.3 MG/DL (ref 8.4–10.2)
CHLORIDE SERPL-SCNC: 110 MMOL/L (ref 96–112)
CO2 SERPL-SCNC: 18 MMOL/L (ref 20–33)
CREAT SERPL-MCNC: 3.02 MG/DL (ref 0.5–1.4)
EKG IMPRESSION: NORMAL
EOSINOPHIL # BLD AUTO: 0.19 K/UL (ref 0–0.51)
EOSINOPHIL NFR BLD: 2.1 % (ref 0–6.9)
ERYTHROCYTE [DISTWIDTH] IN BLOOD BY AUTOMATED COUNT: 48.7 FL (ref 35.9–50)
GLOBULIN SER CALC-MCNC: 2.2 G/DL (ref 1.9–3.5)
GLUCOSE BLD-MCNC: 104 MG/DL (ref 65–99)
GLUCOSE BLD-MCNC: 122 MG/DL (ref 65–99)
GLUCOSE BLD-MCNC: 69 MG/DL (ref 65–99)
GLUCOSE BLD-MCNC: 99 MG/DL (ref 65–99)
GLUCOSE SERPL-MCNC: 80 MG/DL (ref 65–99)
HCT VFR BLD AUTO: 36.5 % (ref 42–52)
HGB BLD-MCNC: 11.8 G/DL (ref 14–18)
IMM GRANULOCYTES # BLD AUTO: 0.04 K/UL (ref 0–0.11)
IMM GRANULOCYTES NFR BLD AUTO: 0.4 % (ref 0–0.9)
LACTATE BLD-SCNC: 0.8 MMOL/L (ref 0.5–2)
LYMPHOCYTES # BLD AUTO: 0.88 K/UL (ref 1–4.8)
LYMPHOCYTES NFR BLD: 9.8 % (ref 22–41)
MAGNESIUM SERPL-MCNC: 1.8 MG/DL (ref 1.5–2.5)
MCH RBC QN AUTO: 28.6 PG (ref 27–33)
MCHC RBC AUTO-ENTMCNC: 32.3 G/DL (ref 33.7–35.3)
MCV RBC AUTO: 88.4 FL (ref 81.4–97.8)
MONOCYTES # BLD AUTO: 0.55 K/UL (ref 0–0.85)
MONOCYTES NFR BLD AUTO: 6.1 % (ref 0–13.4)
NEUTROPHILS # BLD AUTO: 7.31 K/UL (ref 1.82–7.42)
NEUTROPHILS NFR BLD: 81.4 % (ref 44–72)
NRBC # BLD AUTO: 0 K/UL
NRBC BLD-RTO: 0 /100 WBC
PLATELET # BLD AUTO: 304 K/UL (ref 164–446)
PMV BLD AUTO: 9.7 FL (ref 9–12.9)
POTASSIUM SERPL-SCNC: 4.6 MMOL/L (ref 3.6–5.5)
PROT SERPL-MCNC: 5.2 G/DL (ref 6–8.2)
RBC # BLD AUTO: 4.13 M/UL (ref 4.7–6.1)
SODIUM SERPL-SCNC: 137 MMOL/L (ref 135–145)
WBC # BLD AUTO: 9 K/UL (ref 4.8–10.8)

## 2022-01-20 PROCEDURE — 96365 THER/PROPH/DIAG IV INF INIT: CPT | Mod: XU

## 2022-01-20 PROCEDURE — 700102 HCHG RX REV CODE 250 W/ 637 OVERRIDE(OP): Performed by: HOSPITALIST

## 2022-01-20 PROCEDURE — 82962 GLUCOSE BLOOD TEST: CPT | Mod: 91

## 2022-01-20 PROCEDURE — 700111 HCHG RX REV CODE 636 W/ 250 OVERRIDE (IP): Performed by: HOSPITALIST

## 2022-01-20 PROCEDURE — G0378 HOSPITAL OBSERVATION PER HR: HCPCS

## 2022-01-20 PROCEDURE — 83735 ASSAY OF MAGNESIUM: CPT

## 2022-01-20 PROCEDURE — 80053 COMPREHEN METABOLIC PANEL: CPT

## 2022-01-20 PROCEDURE — 83605 ASSAY OF LACTIC ACID: CPT

## 2022-01-20 PROCEDURE — 51702 INSERT TEMP BLADDER CATH: CPT

## 2022-01-20 PROCEDURE — 99214 OFFICE O/P EST MOD 30 MIN: CPT | Performed by: INTERNAL MEDICINE

## 2022-01-20 PROCEDURE — 85025 COMPLETE CBC W/AUTO DIFF WBC: CPT

## 2022-01-20 PROCEDURE — 94760 N-INVAS EAR/PLS OXIMETRY 1: CPT

## 2022-01-20 PROCEDURE — 36415 COLL VENOUS BLD VENIPUNCTURE: CPT

## 2022-01-20 PROCEDURE — 700105 HCHG RX REV CODE 258: Performed by: HOSPITALIST

## 2022-01-20 PROCEDURE — 96372 THER/PROPH/DIAG INJ SC/IM: CPT | Mod: XU

## 2022-01-20 PROCEDURE — 99225 PR SUBSEQUENT OBSERVATION CARE,LEVEL II: CPT | Performed by: INTERNAL MEDICINE

## 2022-01-20 PROCEDURE — A9270 NON-COVERED ITEM OR SERVICE: HCPCS | Performed by: HOSPITALIST

## 2022-01-20 RX ADMIN — OMEPRAZOLE 20 MG: 20 CAPSULE, DELAYED RELEASE ORAL at 06:17

## 2022-01-20 RX ADMIN — ATORVASTATIN CALCIUM 80 MG: 40 TABLET, FILM COATED ORAL at 18:00

## 2022-01-20 RX ADMIN — ASPIRIN 81 MG: 81 TABLET, COATED ORAL at 06:16

## 2022-01-20 RX ADMIN — Medication 2000 UNITS: at 06:17

## 2022-01-20 RX ADMIN — TAMSULOSIN HYDROCHLORIDE 0.4 MG: 0.4 CAPSULE ORAL at 06:18

## 2022-01-20 RX ADMIN — ACETAMINOPHEN 650 MG: 325 TABLET, FILM COATED ORAL at 13:34

## 2022-01-20 RX ADMIN — ALLOPURINOL 100 MG: 100 TABLET ORAL at 20:07

## 2022-01-20 RX ADMIN — CEFTRIAXONE SODIUM 2 G: 2 INJECTION, POWDER, FOR SOLUTION INTRAMUSCULAR; INTRAVENOUS at 06:21

## 2022-01-20 RX ADMIN — CLOPIDOGREL BISULFATE 75 MG: 75 TABLET ORAL at 20:07

## 2022-01-20 RX ADMIN — GABAPENTIN 200 MG: 100 CAPSULE ORAL at 20:07

## 2022-01-20 RX ADMIN — INSULIN GLARGINE 24 UNITS: 100 INJECTION, SOLUTION SUBCUTANEOUS at 18:00

## 2022-01-20 RX ADMIN — FINASTERIDE 5 MG: 5 TABLET, FILM COATED ORAL at 20:07

## 2022-01-20 RX ADMIN — METOPROLOL SUCCINATE 100 MG: 100 TABLET, EXTENDED RELEASE ORAL at 20:07

## 2022-01-20 RX ADMIN — FLUOXETINE 40 MG: 20 CAPSULE ORAL at 06:18

## 2022-01-20 ASSESSMENT — ENCOUNTER SYMPTOMS
LOSS OF CONSCIOUSNESS: 0
VOMITING: 0
DEPRESSION: 0
ABDOMINAL PAIN: 0
CHILLS: 0
NAUSEA: 0
HEADACHES: 0
MYALGIAS: 0
FLANK PAIN: 0
DIZZINESS: 0
SHORTNESS OF BREATH: 0
FEVER: 0

## 2022-01-20 ASSESSMENT — LIFESTYLE VARIABLES
HOW MANY TIMES IN THE PAST YEAR HAVE YOU HAD 5 OR MORE DRINKS IN A DAY: 0
TOTAL SCORE: 0
AVERAGE NUMBER OF DAYS PER WEEK YOU HAVE A DRINK CONTAINING ALCOHOL: 0
ON A TYPICAL DAY WHEN YOU DRINK ALCOHOL HOW MANY DRINKS DO YOU HAVE: 0
EVER FELT BAD OR GUILTY ABOUT YOUR DRINKING: NO
HAVE YOU EVER FELT YOU SHOULD CUT DOWN ON YOUR DRINKING: NO
EVER HAD A DRINK FIRST THING IN THE MORNING TO STEADY YOUR NERVES TO GET RID OF A HANGOVER: NO
HAVE PEOPLE ANNOYED YOU BY CRITICIZING YOUR DRINKING: NO
TOTAL SCORE: 0
ALCOHOL_USE: NO
CONSUMPTION TOTAL: NEGATIVE
TOTAL SCORE: 0

## 2022-01-20 ASSESSMENT — COGNITIVE AND FUNCTIONAL STATUS - GENERAL
MOBILITY SCORE: 10
SUGGESTED CMS G CODE MODIFIER DAILY ACTIVITY: CK
TOILETING: A LOT
CLIMB 3 TO 5 STEPS WITH RAILING: TOTAL
SUGGESTED CMS G CODE MODIFIER MOBILITY: CL
EATING MEALS: A LITTLE
HELP NEEDED FOR BATHING: A LOT
PERSONAL GROOMING: A LITTLE
STANDING UP FROM CHAIR USING ARMS: A LOT
TURNING FROM BACK TO SIDE WHILE IN FLAT BAD: A LOT
DAILY ACTIVITIY SCORE: 14
WALKING IN HOSPITAL ROOM: TOTAL
DRESSING REGULAR LOWER BODY CLOTHING: A LOT
MOVING TO AND FROM BED TO CHAIR: A LOT
DRESSING REGULAR UPPER BODY CLOTHING: A LOT
MOVING FROM LYING ON BACK TO SITTING ON SIDE OF FLAT BED: A LOT

## 2022-01-20 ASSESSMENT — PATIENT HEALTH QUESTIONNAIRE - PHQ9
SUM OF ALL RESPONSES TO PHQ9 QUESTIONS 1 AND 2: 0
2. FEELING DOWN, DEPRESSED, IRRITABLE, OR HOPELESS: NOT AT ALL
1. LITTLE INTEREST OR PLEASURE IN DOING THINGS: NOT AT ALL

## 2022-01-20 ASSESSMENT — FIBROSIS 4 INDEX
FIB4 SCORE: 1.59
FIB4 SCORE: 1.59

## 2022-01-20 ASSESSMENT — PAIN DESCRIPTION - PAIN TYPE: TYPE: ACUTE PAIN

## 2022-01-20 NOTE — PROGRESS NOTES
4 Eyes Skin Assessment Completed by Rizwana RN and LETICIA Gray.    Head WDL  Ears WDL  Nose WDL  Mouth Redness  Neck WDL  Breast/Chest WDL  Shoulder Blades WDL  Spine WDL  (R) Arm/Elbow/Hand Bruising  (L) Arm/Elbow/Hand Redness and Abrasion, skin tear   Abdomen WDL  Groin WDL  Scrotum/Coccyx/Buttocks Redness, Blanching and Excoriation  (R) Leg Redness, Scab and Abrasion  (L) Leg Redness, Scab and Abrasion  (R) Heel/Foot/Toe Redness and Blanching  (L) Heel/Foot/Toe Redness and Blanching          Devices In Places Blood Pressure Cuff      Interventions In Place Waffle Overlay, Pillows, Barrier Cream, Heels Loaded W/Pillows and Pressure Redistribution Mattress    Possible Skin Injury Yes    Pictures Uploaded Into Epic Yes  Wound Consult Placed N/A  RN Wound Prevention Protocol Ordered Yes

## 2022-01-20 NOTE — ASSESSMENT & PLAN NOTE
Resume home amlodipine, and metoprolol parameters  I will hold home losartan for now given his acute kidney injury

## 2022-01-20 NOTE — H&P
Hospital Medicine History & Physical Note    Date of Service  1/19/2022    Primary Care Physician  Madison Bunch D.O.    Consultants  Urology, Dr Kramer    Code Status  Full Code    Chief Complaint  Chief Complaint   Patient presents with   • Weakness     Visitor states is getting progressively more weak   • Abnormal Labs     Visitor states received a call from PCP this am for abnormal Creat level     History of Presenting Illness  Av Bob is a 76 y.o. male with a past medical history of diabetes, hypertension and chronic prostatitis with recurrent infections who presented 1/19/2022 with generalized weakness.  Patient reports that he has been having progressively worsening generalized weakness over the past 2 weeks.  He has been evaluated by his primary care yesterday who noticed evidence of acute kidney injury on his labs, accordingly was sent to the emergency room.  Patient has been having recurrent prostatitis and was multiple courses of Bactrim with urology evaluations.  Patient denies having shortness of breath fevers, oliguria, flank pain.     I discussed the plan of care with emergency department physician, patient, patient's [Usha] wife was present at bedside..    Review of Systems  Review of Systems   Constitutional: Positive for malaise/fatigue. Negative for chills and fever.   Eyes: Negative for discharge and redness.   Respiratory: Negative for cough, shortness of breath and stridor.    Cardiovascular: Negative for chest pain and leg swelling.   Gastrointestinal: Negative for abdominal pain and vomiting.   Genitourinary: Positive for hematuria. Negative for flank pain.   Musculoskeletal: Negative for myalgias.   Skin: Negative.    Neurological: Negative for focal weakness.   Endo/Heme/Allergies: Does not bruise/bleed easily.   Psychiatric/Behavioral: The patient is not nervous/anxious.      Past Medical History   has a past medical history of (HCC) Chronic ulcer of right lower  extremity with fat layer exposed (12/27/2018), Acute on chronic renal failure ([facility]) (1/21/2020), Acute respiratory failure with hypoxia ([facility]) (5/20/2017), CAD (coronary artery disease), Cancer ([facility]), Cataract, Cerebrovascular accident (CVA) ([facility]) (12/30/2016), Chickenpox, CKD (chronic kidney disease) stage 3, GFR 30-59 ml/min ([facility]), Controlled gout (2014), Coronary atherosclerosis of native coronary artery, Daytime sleepiness, Depression, Diabetes ([facility]), Difficulty swallowing, Enterococcal septicemia ([facility]) (8/12/2017), Roberto hematuria (1/29/2020), Frequent urination, GERD (gastroesophageal reflux disease), Lao measles, Hypertension (06/30/2021), Hypokalemia (2012), Hypomagnesemia (08/12/2017), Influenza A (1/26/2020), Insomnia, Kidney stone, Leg edema, right (1/22/2020), Lymphoma ([facility]) (2/19/2017), Mixed hyperlipidemia, Nephrolithiasis (2006), Normocytic hypochromic anemia (5/20/2017), NSTEMI (non-ST elevated myocardial infarction) ([facility]) (07/18/2017), Pain (06/30/2021), Peripheral vascular disease ([facility]), Polyneuropathy in diabetes(357.2) (9/11/2013), Renal mass (1/21/2020), Septic shock ([facility]) (5/20/2017), Skin ulcer of calf ([facility]) (2015), Stroke ([facility]) (2016), Urinary bladder disorder, Urinary incontinence, Weakness, and Wound of left leg (2012).    Surgical History   has a past surgical history that includes lithotripsy; angioplasty (1997); wound closure general (4/3/2012); tonsillectomy and adenoidectomy; cataract extraction with iol; solo by laparoscopy (1998); cystoscopy stent placement (Left, 2/12/2018); ureteroscopy (Left, 2/12/2018); lasertripsy (Left, 2/12/2018); [facility] cardiac cath (1997); [facility] cardiac cath (2009); tonsillectomy; lumbar transforaminal epidural steroid injection (Right, 11/2/2020); lumbar transforaminal epidural steroid injection (Right, 3/29/2021); pr upper gi endoscopy,diagnosis (N/A, 7/21/2021); pr upper gi endoscopy,biopsy (N/A, 7/21/2021); and pr inj lumbar/sacral,w/ imaging (7/27/2021).      Family History  family history includes Cancer in his mother; Depression in his brother and mother; Diabetes in his father; Heart Disease in his brother and father; Kidney stones in his brother; Psychiatric Illness in his brother, mother, and another family member; Suicide Attempts in an other family member.      Social History   reports that he has never smoked. He has never used smokeless tobacco. He reports that he does not drink alcohol and does not use drugs.    Allergies  Allergies   Allergen Reactions   • Diphenhydramine Hcl Anxiety     Pt is able to tolerate  Mg benadryl with less anxiety   • Lorazepam Unspecified     Disorientation   • Ciprofloxacin      Rash,stomach ache   • Spironolactone      Acute kidney injury     Medications  Prior to Admission Medications   Prescriptions Last Dose Informant Patient Reported? Taking?   Cholecalciferol (D3) 2000 UNIT Tab 1/19/2022 at 1000 Significant Other Yes Yes   Sig: Take 2,000 Units by mouth every day.   Dulaglutide (TRULICITY) 3 MG/0.5ML Solution Pen-injector 1/15/2022 at AM Significant Other No No   Sig: Inject 0.5 mL under the skin every Friday.   Patient taking differently: Inject 0.5 mL under the skin every Saturday.   Insulin Glargine, 1 Unit Dial, (TOUJEO SOLOSTAR) 300 UNIT/ML Solution Pen-injector 1/18/2022 at 2330 Significant Other No No   Sig: Inject 24 Units under the skin every day.   Patient taking differently: Inject 22-28 Units under the skin every day. Per wife Sliding Scale, per wife gave 22 units on 1/18/2022   Multiple Vitamin (MULTI VITAMIN MENS PO) 1/19/2022 at 1000 Significant Other Yes No   Sig: Take 1 Tablet by mouth every day.   Probiotic Product (PROBIOTIC DAILY PO) 1/19/2022 at 1000 Significant Other Yes No   Sig: Take 1 Cap by mouth 2 Times a Day.   acetaminophen (TYLENOL) 500 MG Tab 1/18/2022 at 1800 Significant Other Yes Yes   Sig: Take 1,000 mg by mouth every 6 hours as needed for Moderate Pain.   alfuzosin (UROXATRAL) 10 MG SR  tablet 1/19/2022 at 1000 Significant Other Yes No   Sig: Take 10 mg by mouth every day.   allopurinol (ZYLOPRIM) 100 MG Tab 1/18/2022 at 2330 Significant Other No No   Sig: TAKE 1 TABLET BY MOUTH EVERY DAY   Patient taking differently: Take 100 mg by mouth every evening.   amLODIPine (NORVASC) 5 MG Tab 1/19/2022 at 1000 Significant Other No No   Sig: Take 1 Tablet by mouth every day.   aspirin EC (ECOTRIN) 81 MG Tablet Delayed Response 1/19/2022 at 1000 Significant Other Yes No   Sig: Take 81 mg by mouth every day.   atorvastatin (LIPITOR) 80 MG tablet 1/18/2022 at 2330 Significant Other No No   Sig: Take 1 Tablet by mouth every evening.   clopidogrel (PLAVIX) 75 MG Tab 1/18/2022 at 2330 Significant Other No No   Sig: Take 1 Tablet by mouth every day.   Patient taking differently: Take 75 mg by mouth every evening.   fenofibrate (TRICOR) 145 MG Tab 1/18/2022 at 2330 Significant Other No No   Sig: Take 1 Tablet by mouth every day.   Patient taking differently: Take 145 mg by mouth every evening.   finasteride (PROSCAR) 5 MG Tab 1/18/2022 at 2330 Significant Other No No   Sig: Take 1 Tab by mouth every day.   Patient taking differently: Take 5 mg by mouth every evening.   fluoxetine (PROZAC) 40 MG capsule 1/19/2022 at 1000 Significant Other No No   Sig: TAKE 1 CAPSULE BY MOUTH EVERY DAY   Patient taking differently: Take 40 mg by mouth every day.   gabapentin (NEURONTIN) 100 MG Cap 1/18/2022 at 2330 Significant Other No No   Sig: TAKE 1 TO 3 CAPSULES BY MOUTH AT BEDTIME AS NEEDED FOR PAIN   Patient taking differently: Take 200 mg by mouth at bedtime.   insulin lispro (ADMELOG SOLOSTAR) 100 UNIT/ML Solution Pen-injector injection PEN 1/18/2022 at 1700 Significant Other Yes No   Sig: Inject 5-9 Units under the skin 2 times daily, before breakfast and dinner. Start at 5 units then 2 units for every 50 points over 150    losartan (COZAAR) 50 MG Tab 1/19/2022 at 1000 Significant Other No No   Sig: Take 1 Tablet by mouth 2  times a day.   magnesium oxide (MAG-OX) 400 MG Tab 1/19/2022 at 1000 Significant Other Yes No   Sig: Take 800 mg by mouth 2 times a day.   methenamine hip (HIPPREX) 1 GM Tab 1/19/2022 at 1000 Significant Other Yes No   Sig: Take 1 g by mouth 2 times a day.   metoprolol SR (TOPROL XL) 100 MG TABLET SR 24 HR 1/18/2022 at 2330 Significant Other No No   Sig: Take 1 Tablet by mouth every day.   Patient taking differently: Take 100 mg by mouth at bedtime.   omeprazole (PRILOSEC) 20 MG delayed-release capsule 1/19/2022 at 1000 Significant Other Yes No   Sig: Take 20 mg by mouth every day.   potassium chloride (KLOR-CON) 8 MEQ tablet 1/6/2022 at HOLD Significant Other No No   Sig: Take 1 Tablet by mouth every day.   sulfamethoxazole-trimethoprim (BACTRIM) 400-80 MG Tab 1/19/2022 at 1000 Significant Other Yes Yes   Sig: Take 1 Tablet by mouth 2 times a day. Pt started on 1/6/2022 for 14 day course      Facility-Administered Medications: None     Physical Exam  Temp:  [36.4 °C (97.6 °F)] 36.4 °C (97.6 °F)  Pulse:  [62-75] 73  Resp:  [14] 14  BP: ()/(49-74) 93/66  SpO2:  [96 %-100 %] 96 %  Blood Pressure : 109/59   Temperature: 36.4 °C (97.6 °F)   Pulse: 70   Respiration: 14   Pulse Oximetry: 100 %     Physical Exam  Constitutional:       General: He is not in acute distress.     Appearance: He is ill-appearing. He is not diaphoretic.   HENT:      Head: Atraumatic.      Right Ear: External ear normal.      Left Ear: External ear normal.      Nose: No congestion or rhinorrhea.      Mouth/Throat:      Mouth: Mucous membranes are dry.   Eyes:      General: No scleral icterus.        Right eye: No discharge.         Left eye: No discharge.      Pupils: Pupils are equal, round, and reactive to light.   Cardiovascular:      Rate and Rhythm: Normal rate and regular rhythm.   Pulmonary:      Effort: Pulmonary effort is normal.   Abdominal:      General: There is no distension.   Musculoskeletal:      Cervical back: Neck supple.  No rigidity. No muscular tenderness.      Right lower leg: No edema.      Left lower leg: No edema.   Skin:     General: Skin is dry.      Capillary Refill: Capillary refill takes 2 to 3 seconds.      Coloration: Skin is pale. Skin is not jaundiced.   Neurological:      Mental Status: He is alert and oriented to person, place, and time.      Coordination: Coordination normal.   Psychiatric:         Mood and Affect: Mood normal.         Behavior: Behavior normal.       Laboratory:  Recent Labs     01/18/22  1220 01/19/22  1250   WBC 10.6 11.0*   RBC 4.49* 4.49*   HEMOGLOBIN 13.0* 13.0*   HEMATOCRIT 41.1* 40.2*   MCV 91.5 89.5   MCH 29.0 29.0   MCHC 31.6* 32.3*   RDW 50.7* 48.6   PLATELETCT 349 354   MPV 10.3 9.4     Recent Labs     01/18/22  1220 01/19/22  1250   SODIUM 135 133*   POTASSIUM 4.8 5.0   CHLORIDE 103 99   CO2 19* 21   GLUCOSE 166* 121*   BUN 55* 63*   CREATININE 2.95* 3.68*   CALCIUM 9.1 8.9     Recent Labs     01/18/22  1220 01/19/22  1250   ALTSGPT 14 16   ASTSGOT 18 24   ALKPHOSPHAT 72 76   TBILIRUBIN 0.4 0.4   LIPASE  --  43   GLUCOSE 166* 121*     Recent Labs     01/19/22  1250   INR 1.13     No results for input(s): NTPROBNP in the last 72 hours.      Recent Labs     01/19/22  1250 01/19/22  1513   TROPONINT 52* 50*     Imaging:  CT-RENAL COLIC EVALUATION(A/P W/O)   Final Result      1.  Again seen severe bilateral hydronephrosis extending to the level of the urinary bladder. There is some dense material layering dependently within the renal calyces as well as within the distal left ureter and the urinary bladder which may represent    previously administered iodinated contrast agent.      2.  Bilateral diffuse renal cortical thinning.      3.  Asymmetric left urinary bladder wall thickening which is somewhat lobulated and could potentially represent neoplastic process.      4.  Extensive atherosclerotic vascular calcification.      5.  No evidence of bowel obstruction or focal inflammatory change.       DX-CHEST-PORTABLE (1 VIEW)   Final Result         1. No acute cardiopulmonary abnormalities are identified.        Assessment/Plan:  I anticipate this patient is appropriate for observation status at this time.    Acute cystitis without hematuria- (present on admission)  Assessment & Plan  Complicated urinary tract infection  Uro Dr Tanner, consulted, recommended yu & treating urinary tract infection with ceftriaxone    Started on ceftriaxone in the emergency room, continue for now follow-up cultures and sensitivities    Acute kidney injury (HCC)- (present on admission)  Assessment & Plan  Imaging shows evidence for severe bilateral hydronephrosis extending to the level of the urinary bladder  Likely multifactorial secondary to obstructive uropathy and dehydration.  Intravenous fluids  Avoid nephrotoxins, monitor inputs and outputs  Uro Dr Tanner, consulted, recommended yu & treating urinary tract infection with ceftriaxone      Essential hypertension, benign- (present on admission)  Assessment & Plan  Resume home amlodipine, and metoprolol parameters  I will hold home losartan for now given his acute kidney injury    Type 2 diabetes mellitus, with long-term current use of insulin ([facility])- (present on admission)  Assessment & Plan  Without significant hyperglycemia  Last glycated hemoglobin was 7.1 %  Will resume home long acting insulin & start short acting insulin  Accu-Checks, hypoglycemia protocol     Stage 4 chronic kidney disease (HCC)- (present on admission)  Assessment & Plan  With acute kidney injury.  Avoid nephrotoxins as much as possible, renally dose medications, monitor inputs and outputs     GERD with esophagitis- (present on admission)  Assessment & Plan  Resume home omeprazole    VTE prophylaxis: SCDs/TEDs

## 2022-01-20 NOTE — ASSESSMENT & PLAN NOTE
Complicated urinary tract infection  Uro Dr Tanner, consulted, recommended yu & treating urinary tract infection with ceftriaxone    Urine cultures- proteus   Discussed with pharmacy, change to cefdinir

## 2022-01-20 NOTE — ASSESSMENT & PLAN NOTE
Without significant hyperglycemia  Last glycated hemoglobin was 7.1 %  Will resume home long acting insulin & start short acting insulin  Accu-Checks, hypoglycemia protocol

## 2022-01-20 NOTE — CARE PLAN
The patient is Watcher - Medium risk of patient condition declining or worsening    Shift Goals  Clinical Goals: monitor labs & fsbs  Patient Goals: sleep    Progress made toward(s) clinical / shift goals:    Problem: Hemodynamics  Goal: Patient's hemodynamics, fluid balance and neurologic status will be stable or improve  Outcome: Progressing     Problem: Fluid Volume  Goal: Fluid volume balance will be maintained  Outcome: Progressing     Problem: Urinary - Renal Perfusion  Goal: Ability to achieve and maintain adequate renal perfusion and functioning will improve  Outcome: Not Progressing     Problem: Respiratory  Goal: Patient will achieve/maintain optimum respiratory ventilation and gas exchange  Outcome: Progressing         Problem: Urinary - Renal Perfusion  Goal: Ability to achieve and maintain adequate renal perfusion and functioning will improve  Outcome: Not Progressing

## 2022-01-20 NOTE — ASSESSMENT & PLAN NOTE
Imaging shows evidence for severe bilateral hydronephrosis extending to the level of the urinary bladder  Likely multifactorial secondary to obstructive uropathy and dehydration.  Intravenous fluids  Avoid nephrotoxins, monitor inputs and outputs  Uro Dr Tanner, consulted, recommended yu & treating urinary tract infection with ceftriaxone    Nephrology consulted  -Follows with Dr. Jarvis outpatient     improving

## 2022-01-20 NOTE — CARE PLAN
The patient is Watcher - Medium risk of patient condition declining or worsening    Shift Goals  Clinical Goals: maintain skin integrity , sleep   Patient Goals: Sleep    Progress made toward(s) clinical / shift goals:    Problem: Hemodynamics  Goal: Patient's hemodynamics, fluid balance and neurologic status will be stable or improve  Outcome: Progressing     Problem: Skin Integrity  Goal: Skin integrity is maintained or improved  Outcome: Progressing     Problem: Fall Risk  Goal: Patient will remain free from falls  Outcome: Progressing     Problem: Physical Regulation  Goal: Diagnostic test results will improve  Outcome: Progressing  Goal: Signs and symptoms of infection will decrease  Outcome: Progressing       Patient is not progressing towards the following goals:

## 2022-01-20 NOTE — CONSULTS
[facility] [place] Consult/H&P Note    Primary Service:   Attending: Yina Rodriguez D.O.  Patient's Name/MRN: Av Bob, [id number]    Admit Date:1/19/2022  Today's Date: 1/20/2022   Length of stay:  LOS: 0 days   Room #: 3312/02      Reason for consult/chief complaint: Urinary retention with bilateral hydro and CATA with UTI  ID/HPI: Av Bob is a 76 y.o. male patient who presented to the ED with CATA and findings of bilateral hydronephrosis and retention. CT also demonstrated asymmetric left urinary bladder wall thickening, somewhat lobulated (correlate for neoplasm). He was started on flomax and finasteride.     Av reports that he is feeling better. He has a condom catheter only in place. His Cr is improving and now at 3.02 (3.68). He is on ceftriaxone at this time for nitrite positive UA. He is eating breakfast without issue. He denies fever, chills, dysuria, gross hematuria, new onset flank pain, abdominal pain at this time.         Past Medical History:   Past Medical History:   Diagnosis Date   • ([facility]) Chronic ulcer of right lower extremity with fat layer exposed 12/27/2018   • Acute on chronic renal failure ([facility]) 1/21/2020   • Acute respiratory failure with hypoxia ([facility]) 5/20/2017   • CAD (coronary artery disease)     GIOVANNA to RCA in '97, GIOVANNA X2 to LAD and GIOVANNA X2 to OM in '09   • Cancer ([facility])     2017; chemo lympoma non hodgkins lymphoma   • Cataract     dez iol   • Cerebrovascular accident (CVA) ([facility]) 12/30/2016    Left arm weakness  etiology of stroke not established, lymphoma discovered on MRI evaluation of stroke, L hemiparesis much worse after acute infectious illness in mid 2017, but no specific diagnosed recurrent neurological etiology, all at [facility]   • Chickenpox    • CKD (chronic kidney disease) stage 3, GFR 30-59 ml/min ([facility])    • Controlled gout 2014   • Coronary atherosclerosis of native coronary artery     S/P PTCA (percutaneous  transluminal coronary angioplasty), RCA, 5/1997, patent on cath 7/10/2009 at the time of interventions on his left anterior descending and circumflex coronary arteries   • Daytime sleepiness    • Depression    • Diabetes ([facility])    • Difficulty swallowing    • Enterococcal septicemia ([facility]) 8/12/2017   • Roberto hematuria 1/29/2020   • Frequent urination    • GERD (gastroesophageal reflux disease)    • Nepali measles    • Hypertension 06/30/2021    pt states well controlled on meds   • Hypokalemia 2012    controlled with combination of ACE inhibitor or ARB plus spironolactone   • Hypomagnesemia 08/12/2017    etiology uncertain   • Influenza A 1/26/2020   • Insomnia    • Kidney stone    • Leg edema, right 1/22/2020   • 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 Barry   • Normocytic hypochromic anemia 5/20/2017   • NSTEMI (non-ST elevated myocardial infarction) ([facility]) 07/18/2017    complicating UTI with sepsis   • Pain 06/30/2021    right leg foot   • Peripheral vascular disease ([facility])    • Polyneuropathy in diabetes(357.2) 9/11/2013   • Renal mass 1/21/2020   • Septic shock ([facility]) 5/20/2017   • Skin ulcer of calf ([facility]) 2015    Right, Dr. Terry and wound care   • Stroke ([facility]) 2016    left sided weakness   • Urinary bladder disorder    • Urinary incontinence     pt wears pads   • Weakness    • Wound of left leg 2012    Requiring surgery and debridment, Dr. Moore        Past Surgical History:   Past Surgical History:   Procedure Laterality Date   • [place] INJ LUMBAR/SACRAL,W/ IMAGING  7/27/2021    Procedure: Lumbar L5-S1 interlaminar epidural steroid injection;  Surgeon: Harpal Bennett M.D.;  Location: [facility];  Service: Pain Management   • [place] UPPER GI ENDOSCOPY,DIAGNOSIS N/A 7/21/2021    Procedure: GASTROSCOPY - AND DILATION;  Surgeon: Neville Huerta M.D.;  Location: SURGERY [facility] DAY [facility];  Service:  Gastroenterology   • OR UPPER GI ENDOSCOPY,BIOPSY N/A 7/21/2021    Procedure: GASTROSCOPY, WITH BIOPSY;  Surgeon: Neville Huerta M.D.;  Location: SURGERY SAME DAY [facility];  Service: Gastroenterology   • LUMBAR TRANSFORAMINAL EPIDURAL STEROID INJECTION Right 3/29/2021    Procedure: RIGHT L3-4 and L4-5 transforaminal epidural steroid injection with fluoroscopic guidance;  Surgeon: Harpal Bennett M.D.;  Location: [facility];  Service: Pain Management   • LUMBAR TRANSFORAMINAL EPIDURAL STEROID INJECTION Right 11/2/2020    Procedure: INJECTION, STEROID, SPINE, LUMBAR, EPIDURAL, TRANSFORAMINAL APPROACH;  Surgeon: Harpal Bennett M.D.;  Location: [facility];  Service: Pain Management   • CYSTOSCOPY STENT PLACEMENT Left 2/12/2018    Procedure: CYSTOSCOPY  ;  Surgeon: Rey Barry M.D.;  Location: [facility];  Service: Urology   • URETEROSCOPY Left 2/12/2018    Procedure: URETEROSCOPY- FLEXIBLE  ;  Surgeon: Rey Barry M.D.;  Location: SUR[facility]ERY [facility];  Service: Urology   • LASERTRIPSY Left 2/12/2018    Procedure: LASERTRIPSY - LITHO  ;  Surgeon: Rey Barry M.D.;  Location: SURGERY [facility];  Service: Urology   • WOUND CLOSURE GENERAL  4/3/2012    Performed by EGRHARD GILBERT at SURGERY SAME DAY [facility] ORS   • ZZZ CARDIAC CATH  2009    Stents to LAD, Om   • DAGOBERTO BY LAPAROSCOPY  1998   • ANGIOPLASTY  1997    RCA followed by other stents as noted above.    • ZZZ CARDIAC CATH  1997    stent RCA   • CATARACT EXTRACTION WITH IOL      bilateral   • LITHOTRIPSY     • TONSILLECTOMY     • TONSILLECTOMY AND ADENOIDECTOMY          Family History:   Family History   Problem Relation Age of Onset   • Heart Disease Father         CAD   • Diabetes Father    • Cancer Mother    • Psychiatric Illness Mother         Depression   • Depression Mother    • Kidney stones Brother    • Heart Disease Brother    • Psychiatric Illness Brother         Depression   •  Depression Brother    • Suicide Attempts Other    • Psychiatric Illness Other         autism         Social History:   Social History     Tobacco Use   • Smoking status: Never Smoker   • Smokeless tobacco: Never Used   Vaping Use   • Vaping Use: Never used   Substance Use Topics   • Alcohol use: Never   • Drug use: Never      Social History     Social History Narrative    Av is from [place], [place] and raised in [place]. He has been in [place] since 2004. 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  He has been  to Usha since 2003, they met through a mutual friend. He has a daughter (Kalina) and a step son (Jimi).        Allergies: he Diphenhydramine hcl, Lorazepam, Ciprofloxacin, and Spironolactone    Medications:   Medications Prior to Admission   Medication Sig Dispense Refill Last Dose   • sulfamethoxazole-trimethoprim (BACTRIM) 400-80 MG Tab Take 1 Tablet by mouth 2 times a day. Pt started on 1/6/2022 for 14 day course   1/19/2022 at 1000   • acetaminophen (TYLENOL) 500 MG Tab Take 1,000 mg by mouth every 6 hours as needed for Moderate Pain.   1/18/2022 at 1800   • Cholecalciferol (D3) 2000 UNIT Tab Take 2,000 Units by mouth every day.   1/19/2022 at 1000   • Insulin Glargine, 1 Unit Dial, (TOUJEO SOLOSTAR) 300 UNIT/ML Solution Pen-injector Inject 24 Units under the skin every day. (Patient taking differently: Inject 22-28 Units under the skin every day. Per wife Sliding Scale, per wife gave 22 units on 1/18/2022) 4.5 mL 3 1/18/2022 at 2330   • Dulaglutide (TRULICITY) 3 MG/0.5ML Solution Pen-injector Inject 0.5 mL under the skin every Friday. (Patient taking differently: Inject 0.5 mL under the skin every Saturday.) 2 mL 3 1/15/2022 at AM   • insulin lispro (ADMELOG  SOLOSTAR) 100 UNIT/ML Solution Pen-injector injection PEN Inject 5-9 Units under the skin 2 times daily, before breakfast and dinner. Start at 5 units then 2 units for every 50 points over 150    1/18/2022 at 1700   • potassium chloride (KLOR-CON) 8 MEQ tablet Take 1 Tablet by mouth every day. 90 Tablet 3 1/6/2022 at HOLD   • metoprolol SR (TOPROL XL) 100 MG TABLET SR 24 HR Take 1 Tablet by mouth every day. (Patient taking differently: Take 100 mg by mouth at bedtime.) 90 Tablet 3 1/18/2022 at 2330   • losartan (COZAAR) 50 MG Tab Take 1 Tablet by mouth 2 times a day. 180 Tablet 3 1/19/2022 at 1000   • fenofibrate (TRICOR) 145 MG Tab Take 1 Tablet by mouth every day. (Patient taking differently: Take 145 mg by mouth every evening.) 90 Tablet 3 1/18/2022 at 2330   • clopidogrel (PLAVIX) 75 MG Tab Take 1 Tablet by mouth every day. (Patient taking differently: Take 75 mg by mouth every evening.) 90 Tablet 3 1/18/2022 at 2330   • amLODIPine (NORVASC) 5 MG Tab Take 1 Tablet by mouth every day. 90 Tablet 3 1/19/2022 at 1000   • atorvastatin (LIPITOR) 80 MG tablet Take 1 Tablet by mouth every evening. 90 Tablet 3 1/18/2022 at 2330   • omeprazole (PRILOSEC) 20 MG delayed-release capsule Take 20 mg by mouth every day.   1/19/2022 at 1000   • gabapentin (NEURONTIN) 100 MG Cap TAKE 1 TO 3 CAPSULES BY MOUTH AT BEDTIME AS NEEDED FOR PAIN (Patient taking differently: Take 200 mg by mouth at bedtime.) 90 capsule 3 1/18/2022 at 2330   • fluoxetine (PROZAC) 40 MG capsule TAKE 1 CAPSULE BY MOUTH EVERY DAY (Patient taking differently: Take 40 mg by mouth every day.) 90 capsule 3 1/19/2022 at 1000   • allopurinol (ZYLOPRIM) 100 MG Tab TAKE 1 TABLET BY MOUTH EVERY DAY (Patient taking differently: Take 100 mg by mouth every evening.) 100 tablet 3 1/18/2022 at 2330   • Multiple Vitamin (MULTI VITAMIN MENS PO) Take 1 Tablet by mouth every day.   1/19/2022 at 1000   • aspirin EC (ECOTRIN) 81 MG Tablet Delayed Response Take 81 mg by mouth  "every day.   1/19/2022 at 1000   • Probiotic Product (PROBIOTIC DAILY PO) Take 1 Cap by mouth 2 Times a Day.   1/19/2022 at 1000   • magnesium oxide (MAG-OX) 400 MG Tab Take 800 mg by mouth 2 times a day.   1/19/2022 at 1000   • finasteride (PROSCAR) 5 MG Tab Take 1 Tab by mouth every day. (Patient taking differently: Take 5 mg by mouth every evening.) 30 Tab 0 1/18/2022 at 2330   • alfuzosin (UROXATRAL) 10 MG SR tablet Take 10 mg by mouth every day.   1/19/2022 at 1000   • methenamine hip (HIPPREX) 1 GM Tab Take 1 g by mouth 2 times a day.   1/19/2022 at 1000         Review of Systems  Review of Systems   Constitutional: Negative for chills and fever.   Respiratory: Negative for shortness of breath.    Cardiovascular: Negative for chest pain.   Gastrointestinal: Negative for nausea.   Genitourinary: Negative for flank pain and hematuria.   Neurological: Negative for loss of consciousness.        Physical Exam  VITAL SIGNS: /62   Pulse 70   Temp 36.2 °C (97.2 °F) (Oral)   Resp 18   Ht 1.778 m (5' 10\")   Wt 93 kg (205 lb 0.4 oz)   SpO2 98%   BMI 29.42 kg/m²   Physical Exam  Constitutional:       Appearance: Normal appearance.   HENT:      Head: Normocephalic and atraumatic.   Pulmonary:      Effort: Pulmonary effort is normal. No respiratory distress.   Abdominal:      Palpations: Abdomen is soft.      Comments: Palpable distended bladder, no suprapubic TTP   Genitourinary:     Comments: No CVA TTP  Neurological:      Mental Status: He is alert and oriented to person, place, and time.   Psychiatric:         Mood and Affect: Mood normal.         Behavior: Behavior normal.           Labs:  Recent Labs     01/18/22  1220 01/19/22  1250 01/20/22  0555   WBC 10.6 11.0* 9.0   RBC 4.49* 4.49* 4.13*   HEMOGLOBIN 13.0* 13.0* 11.8*   HEMATOCRIT 41.1* 40.2* 36.5*   MCV 91.5 89.5 88.4   MCH 29.0 29.0 28.6   MCHC 31.6* 32.3* 32.3*   RDW 50.7* 48.6 48.7   PLATELETCT 349 354 304   MPV 10.3 9.4 9.7     Recent Labs     " 01/18/22  1220 01/19/22  1250 01/20/22  0555   SODIUM 135 133* 137   POTASSIUM 4.8 5.0 4.6   CHLORIDE 103 99 110   CO2 19* 21 18*   GLUCOSE 166* 121* 80   BUN 55* 63* 61*   CREATININE 2.95* 3.68* 3.02*   CALCIUM 9.1 8.9 8.3*     Recent Labs     01/19/22  1250   INR 1.13     Glucose:  Recent Labs     01/18/22  1220 01/19/22  1250 01/20/22  0555   GLUCOSE 166* 121* 80     Coags:  Recent Labs     01/19/22  1250   INR 1.13         Urinalysis:   Recent Labs     01/19/22  1532   COLORURINE Red*   CLARITY Turbid*   SPECGRAVITY 1.010   PHURINE 8.5*   GLUCOSEUR Negative   KETONES Negative   NITRITE Positive*   OCCULTBLOOD Large*   RBCURINE 2-5*   BACTERIA Moderate*   EPITHELCELL Negative       Imaging:          IMPRESSION:     1.  Again seen severe bilateral hydronephrosis extending to the level of the urinary bladder. There is some dense material layering dependently within the renal calyces as well as within the distal left ureter and the urinary bladder which may represent   previously administered iodinated contrast agent.     2.  Bilateral diffuse renal cortical thinning.     3.  Asymmetric left urinary bladder wall thickening which is somewhat lobulated and could potentially represent neoplastic process.     4.  Extensive atherosclerotic vascular calcification.     5.  No evidence of bowel obstruction or focal inflammatory change.             Assessment/Recommendation   Av Bob is a 76 y.o. male patient who presented to the ED with CATA and findings of bilateral hydronephrosis and retention. CT also demonstrated asymmetric left urinary bladder wall thickening, somewhat lobulated (correlate for neoplasm). He was started on flomax and finasteride.    · RN to place yu catheter in exchange of his condom catheter to decompress the bladder and to help resolve his hydronephrosis.  · Continue ABX per medicine, will follow culture results  · He will discharge with yu in place and ultimately require  outpatient cystoscopy and management of his retention. Continue with flomax and finasteride.  · Urology to follow     Dr. Ogden aware of patient and plan of care.        Cedrick Rowan, PSAMANTHA.-C.   [address] [id number]   [phone]

## 2022-01-20 NOTE — PROGRESS NOTES
16fr coude yu catheter inserted atraumatically, pt tolerated well. Returned 410mL cloudy, red urine.

## 2022-01-20 NOTE — ED NOTES
After giving report to LETICIA Wagoner, this RN re-entered patient's room to administer subQ Lantus and noted a new wound to patient's LEFT elbow, caused by patient leaning against gurney rail. GUIDO Moreno provided wound care & dressing to wound and MD Chun notified.

## 2022-01-20 NOTE — DISCHARGE PLANNING
Anticipated Discharge Disposition: SNF VS home with care      Action: FREDO RN met with pt and pts wife at bedside. Discussed discharge plan. Pts stating she is her husbands support system. Stating they have used [facility] about 5 years ago and would be agreeable to using them again id therapy recommends home with HH. Collected choice for SNF as [facility] and [facility]. Wife would like to talk with therapy and receive recommendations prior to sending to SNFs, if therapy recommends SNF wife is agreeable to sending choice form.     Barriers to Discharge: Medical clearance, therapy recommendations     Plan: Case management to follow up with therapy recommendations

## 2022-01-20 NOTE — PROGRESS NOTES
[facility] Medicine Daily Progress Note    Date of Service  1/20/2022    Chief Complaint  Av Bob is a 76 y.o. male admitted 1/19/2022 with generalized weakness.     Hospital Course  76 y.o. male with a past medical history of diabetes, hypertension and chronic prostatitis with recurrent infections who presented 1/19/2022 with generalized weakness.  Patient reports that he has been having progressively worsening generalized weakness over the past 2 weeks.  He has been evaluated by his primary care yesterday who noticed evidence of acute kidney injury on his labs, accordingly was sent to the emergency room.  Patient has been having recurrent prostatitis and was multiple courses of Bactrim with urology evaluations.  Patient admitted started on IV antibiotics.  Urology was consulted recommended Michaels placement.  Patient also with acute kidney injury, nephrology consulted.    Interval Problem Update  Michaels catheter to be placed.  Nephrology consulted for CATA, appreciate recommendations. Patient reports he is very fatigued but denies abdominal pain. Wife at bedside discussed plan of care and answered all questions. Per wife over the last few weeks patient has been physically declining and has had trouble standing the last few days. PT/OT pending.     I have personally seen and examined the patient at bedside. I discussed the plan of care with patient, bedside RN, charge RN,  and nephrology.    Consultants/Specialty  nephrology and urology    Code Status  Full Code    Disposition  Patient is not medically cleared for discharge.   Anticipate discharge to to home with close outpatient follow-up.  I have placed the appropriate orders for post-discharge needs.    Review of Systems  Review of Systems   Constitutional: Positive for malaise/fatigue. Negative for chills and fever.   Respiratory: Negative for shortness of breath.    Cardiovascular: Negative for chest pain.   Gastrointestinal: Negative for  abdominal pain, nausea and vomiting.   Genitourinary: Positive for hematuria. Negative for dysuria.   Musculoskeletal: Negative for myalgias.   Skin: Negative for rash.   Neurological: Negative for dizziness and headaches.   Psychiatric/Behavioral: Negative for depression.   All other systems reviewed and are negative.       Physical Exam  Temp:  [36.2 °C (97.2 °F)-36.7 °C (98.1 °F)] 36.2 °C (97.2 °F)  Pulse:  [62-73] 70  Resp:  [18] 18  BP: ()/(49-74) 126/62  SpO2:  [94 %-100 %] 98 %    Physical Exam  Vitals and nursing note reviewed.   Constitutional:       General: He is not in acute distress.     Appearance: Normal appearance.   HENT:      Head: Normocephalic and atraumatic.   Eyes:      Conjunctiva/sclera: Conjunctivae normal.      Pupils: Pupils are equal, round, and reactive to light.   Cardiovascular:      Rate and Rhythm: Normal rate and regular rhythm.      Pulses: Normal pulses.   Pulmonary:      Effort: Pulmonary effort is normal. No respiratory distress.      Breath sounds: Normal breath sounds. No wheezing.   Abdominal:      General: Abdomen is flat. There is no distension.      Palpations: Abdomen is soft.      Tenderness: There is no abdominal tenderness.   Musculoskeletal:         General: No swelling. Normal range of motion.      Cervical back: Normal range of motion and neck supple.      Comments: Left arm contracture   Skin:     General: Skin is warm and dry.      Findings: No rash.   Neurological:      General: No focal deficit present.      Mental Status: He is alert and oriented to person, place, and time.      Cranial Nerves: No cranial nerve deficit.      Motor: Weakness (left arm, LEs) present.   Psychiatric:         Mood and Affect: Mood normal.         Behavior: Behavior normal.         Fluids    Intake/Output Summary (Last 24 hours) at 1/20/2022 1401  Last data filed at 1/20/2022 1154  Gross per 24 hour   Intake 1000 ml   Output 410 ml   Net 590 ml       Laboratory  Recent Labs      01/18/22  1220 01/19/22  1250 01/20/22  0555   WBC 10.6 11.0* 9.0   RBC 4.49* 4.49* 4.13*   HEMOGLOBIN 13.0* 13.0* 11.8*   HEMATOCRIT 41.1* 40.2* 36.5*   MCV 91.5 89.5 88.4   MCH 29.0 29.0 28.6   MCHC 31.6* 32.3* 32.3*   RDW 50.7* 48.6 48.7   PLATELETCT 349 354 304   MPV 10.3 9.4 9.7     Recent Labs     01/18/22  1220 01/19/22  1250 01/20/22  0555   SODIUM 135 133* 137   POTASSIUM 4.8 5.0 4.6   CHLORIDE 103 99 110   CO2 19* 21 18*   GLUCOSE 166* 121* 80   BUN 55* 63* 61*   CREATININE 2.95* 3.68* 3.02*   CALCIUM 9.1 8.9 8.3*     Recent Labs     01/19/22  1250   INR 1.13               Imaging  CT-RENAL COLIC EVALUATION(A/P W/O)   Final Result      1.  Again seen severe bilateral hydronephrosis extending to the level of the urinary bladder. There is some dense material layering dependently within the renal calyces as well as within the distal left ureter and the urinary bladder which may represent    previously administered iodinated contrast agent.      2.  Bilateral diffuse renal cortical thinning.      3.  Asymmetric left urinary bladder wall thickening which is somewhat lobulated and could potentially represent neoplastic process.      4.  Extensive atherosclerotic vascular calcification.      5.  No evidence of bowel obstruction or focal inflammatory change.      DX-CHEST-PORTABLE (1 VIEW)   Final Result         1. No acute cardiopulmonary abnormalities are identified.           Assessment/Plan  Essential hypertension, benign- (present on admission)  Assessment & Plan  Resume home amlodipine, and metoprolol parameters  I will hold home losartan for now given his acute kidney injury    Acute cystitis without hematuria- (present on admission)  Assessment & Plan  Complicated urinary tract infection  Uro Dr Tanner, consulted, recommended yu & treating urinary tract infection with ceftriaxone    Started on ceftriaxone in the emergency room, continue for now follow-up cultures and sensitivities    GERD with  esophagitis- (present on admission)  Assessment & Plan  Resume home omeprazole    Acute kidney injury (HCC)- (present on admission)  Assessment & Plan  Imaging shows evidence for severe bilateral hydronephrosis extending to the level of the urinary bladder  Likely multifactorial secondary to obstructive uropathy and dehydration.  Intravenous fluids  Avoid nephrotoxins, monitor inputs and outputs  Uro Dr Tanner, consulted, recommended yu & treating urinary tract infection with ceftriaxone    Nephrology consulted, appreciate recs  -Follows with Dr. Jarvis outpatient     Type 2 diabetes mellitus, with long-term current use of insulin (HCC)- (present on admission)  Assessment & Plan  Without significant hyperglycemia  Last glycated hemoglobin was 7.1 %  Will resume home long acting insulin & start short acting insulin  Accu-Checks, hypoglycemia protocol     Stage 4 chronic kidney disease (HCC)- (present on admission)  Assessment & Plan  With acute kidney injury.  Avoid nephrotoxins as much as possible, renally dose medications, monitor inputs and outputs        VTE prophylaxis: SCDs/TEDs    I have performed a physical exam and reviewed and updated ROS and Plan today (1/20/2022). In review of yesterday's note (1/19/2022), there are no changes except as documented above.

## 2022-01-20 NOTE — ED NOTES
This RN introduced self to patient & patient's wife. Plan of care for tonight was discussed. Wife states she will go home & take patient belongings. Patient keeping cell phone & glasses and nothing else. Wife's cell phone number confirmed in chart. Patient & wife have no further needs at this time.    Safety precautions are in place including gurney locked and in lowest position, bilateral rails up, call light within reach. Patient encouraged to use call light to notify staff for any needs.

## 2022-01-21 LAB
ANION GAP SERPL CALC-SCNC: 13 MMOL/L (ref 7–16)
BACTERIA UR CULT: ABNORMAL
BACTERIA UR CULT: ABNORMAL
BASOPHILS # BLD AUTO: 0.4 % (ref 0–1.8)
BASOPHILS # BLD: 0.04 K/UL (ref 0–0.12)
BUN SERPL-MCNC: 51 MG/DL (ref 8–22)
CALCIUM SERPL-MCNC: 9 MG/DL (ref 8.5–10.5)
CHLORIDE SERPL-SCNC: 107 MMOL/L (ref 96–112)
CO2 SERPL-SCNC: 18 MMOL/L (ref 20–33)
CREAT SERPL-MCNC: 2.39 MG/DL (ref 0.5–1.4)
EOSINOPHIL # BLD AUTO: 0.35 K/UL (ref 0–0.51)
EOSINOPHIL NFR BLD: 3.4 % (ref 0–6.9)
ERYTHROCYTE [DISTWIDTH] IN BLOOD BY AUTOMATED COUNT: 47.3 FL (ref 35.9–50)
GLUCOSE BLD-MCNC: 112 MG/DL (ref 65–99)
GLUCOSE BLD-MCNC: 132 MG/DL (ref 65–99)
GLUCOSE BLD-MCNC: 150 MG/DL (ref 65–99)
GLUCOSE BLD-MCNC: 166 MG/DL (ref 65–99)
GLUCOSE BLD-MCNC: 51 MG/DL (ref 65–99)
GLUCOSE BLD-MCNC: 53 MG/DL (ref 65–99)
GLUCOSE BLD-MCNC: 56 MG/DL (ref 65–99)
GLUCOSE BLD-MCNC: 64 MG/DL (ref 65–99)
GLUCOSE BLD-MCNC: 94 MG/DL (ref 65–99)
GLUCOSE SERPL-MCNC: 61 MG/DL (ref 65–99)
HCT VFR BLD AUTO: 40.7 % (ref 42–52)
HGB BLD-MCNC: 13.3 G/DL (ref 14–18)
IMM GRANULOCYTES # BLD AUTO: 0.08 K/UL (ref 0–0.11)
IMM GRANULOCYTES NFR BLD AUTO: 0.8 % (ref 0–0.9)
LYMPHOCYTES # BLD AUTO: 1.05 K/UL (ref 1–4.8)
LYMPHOCYTES NFR BLD: 10.2 % (ref 22–41)
MCH RBC QN AUTO: 28.7 PG (ref 27–33)
MCHC RBC AUTO-ENTMCNC: 32.7 G/DL (ref 33.7–35.3)
MCV RBC AUTO: 87.7 FL (ref 81.4–97.8)
MONOCYTES # BLD AUTO: 0.63 K/UL (ref 0–0.85)
MONOCYTES NFR BLD AUTO: 6.1 % (ref 0–13.4)
NEUTROPHILS # BLD AUTO: 8.17 K/UL (ref 1.82–7.42)
NEUTROPHILS NFR BLD: 79.1 % (ref 44–72)
NRBC # BLD AUTO: 0 K/UL
NRBC BLD-RTO: 0 /100 WBC
PLATELET # BLD AUTO: 367 K/UL (ref 164–446)
PMV BLD AUTO: 9.7 FL (ref 9–12.9)
POTASSIUM SERPL-SCNC: 4.1 MMOL/L (ref 3.6–5.5)
RBC # BLD AUTO: 4.64 M/UL (ref 4.7–6.1)
SIGNIFICANT IND 70042: ABNORMAL
SITE SITE: ABNORMAL
SODIUM SERPL-SCNC: 138 MMOL/L (ref 135–145)
SOURCE SOURCE: ABNORMAL
WBC # BLD AUTO: 10.3 K/UL (ref 4.8–10.8)

## 2022-01-21 PROCEDURE — 99214 OFFICE O/P EST MOD 30 MIN: CPT | Performed by: INTERNAL MEDICINE

## 2022-01-21 PROCEDURE — A9270 NON-COVERED ITEM OR SERVICE: HCPCS | Performed by: HOSPITALIST

## 2022-01-21 PROCEDURE — G0378 HOSPITAL OBSERVATION PER HR: HCPCS

## 2022-01-21 PROCEDURE — 85025 COMPLETE CBC W/AUTO DIFF WBC: CPT

## 2022-01-21 PROCEDURE — 99225 PR SUBSEQUENT OBSERVATION CARE,LEVEL II: CPT | Performed by: INTERNAL MEDICINE

## 2022-01-21 PROCEDURE — 700111 HCHG RX REV CODE 636 W/ 250 OVERRIDE (IP): Performed by: HOSPITALIST

## 2022-01-21 PROCEDURE — 96372 THER/PROPH/DIAG INJ SC/IM: CPT

## 2022-01-21 PROCEDURE — 96366 THER/PROPH/DIAG IV INF ADDON: CPT

## 2022-01-21 PROCEDURE — 80048 BASIC METABOLIC PNL TOTAL CA: CPT

## 2022-01-21 PROCEDURE — 94760 N-INVAS EAR/PLS OXIMETRY 1: CPT

## 2022-01-21 PROCEDURE — 700105 HCHG RX REV CODE 258: Performed by: HOSPITALIST

## 2022-01-21 PROCEDURE — 700102 HCHG RX REV CODE 250 W/ 637 OVERRIDE(OP): Performed by: HOSPITALIST

## 2022-01-21 PROCEDURE — 82962 GLUCOSE BLOOD TEST: CPT | Mod: 91

## 2022-01-21 PROCEDURE — 36415 COLL VENOUS BLD VENIPUNCTURE: CPT

## 2022-01-21 RX ORDER — CEFDINIR 300 MG/1
300 CAPSULE ORAL EVERY 12 HOURS
Status: DISCONTINUED | OUTPATIENT
Start: 2022-01-22 | End: 2022-01-22 | Stop reason: HOSPADM

## 2022-01-21 RX ADMIN — Medication 2000 UNITS: at 04:18

## 2022-01-21 RX ADMIN — CLOPIDOGREL BISULFATE 75 MG: 75 TABLET ORAL at 19:34

## 2022-01-21 RX ADMIN — TAMSULOSIN HYDROCHLORIDE 0.4 MG: 0.4 CAPSULE ORAL at 04:19

## 2022-01-21 RX ADMIN — GABAPENTIN 200 MG: 100 CAPSULE ORAL at 19:34

## 2022-01-21 RX ADMIN — ASPIRIN 81 MG: 81 TABLET, COATED ORAL at 04:18

## 2022-01-21 RX ADMIN — INSULIN GLARGINE 24 UNITS: 100 INJECTION, SOLUTION SUBCUTANEOUS at 17:56

## 2022-01-21 RX ADMIN — ATORVASTATIN CALCIUM 80 MG: 40 TABLET, FILM COATED ORAL at 18:00

## 2022-01-21 RX ADMIN — OMEPRAZOLE 20 MG: 20 CAPSULE, DELAYED RELEASE ORAL at 04:18

## 2022-01-21 RX ADMIN — METOPROLOL SUCCINATE 100 MG: 100 TABLET, EXTENDED RELEASE ORAL at 19:34

## 2022-01-21 RX ADMIN — CEFTRIAXONE SODIUM 2 G: 2 INJECTION, POWDER, FOR SOLUTION INTRAMUSCULAR; INTRAVENOUS at 04:19

## 2022-01-21 RX ADMIN — FLUOXETINE 40 MG: 20 CAPSULE ORAL at 04:19

## 2022-01-21 RX ADMIN — FINASTERIDE 5 MG: 5 TABLET, FILM COATED ORAL at 19:34

## 2022-01-21 RX ADMIN — ACETAMINOPHEN 650 MG: 325 TABLET, FILM COATED ORAL at 04:18

## 2022-01-21 RX ADMIN — ALLOPURINOL 100 MG: 100 TABLET ORAL at 19:34

## 2022-01-21 ASSESSMENT — COGNITIVE AND FUNCTIONAL STATUS - GENERAL
DRESSING REGULAR UPPER BODY CLOTHING: A LOT
SUGGESTED CMS G CODE MODIFIER DAILY ACTIVITY: CK
CLIMB 3 TO 5 STEPS WITH RAILING: TOTAL
MOVING TO AND FROM BED TO CHAIR: A LITTLE
HELP NEEDED FOR BATHING: A LOT
SUGGESTED CMS G CODE MODIFIER MOBILITY: CL
DAILY ACTIVITIY SCORE: 14
MOVING FROM LYING ON BACK TO SITTING ON SIDE OF FLAT BED: A LOT
PERSONAL GROOMING: A LITTLE
STANDING UP FROM CHAIR USING ARMS: A LOT
WALKING IN HOSPITAL ROOM: A LOT
DRESSING REGULAR LOWER BODY CLOTHING: A LOT
MOBILITY SCORE: 14
TOILETING: A LOT
EATING MEALS: A LITTLE

## 2022-01-21 ASSESSMENT — ENCOUNTER SYMPTOMS
VOMITING: 0
DIZZINESS: 0
SHORTNESS OF BREATH: 0
COUGH: 0
DEPRESSION: 0
HEADACHES: 0
FEVER: 0
NAUSEA: 0
ABDOMINAL PAIN: 0
CHILLS: 0
MYALGIAS: 0

## 2022-01-21 ASSESSMENT — PAIN DESCRIPTION - PAIN TYPE
TYPE: CHRONIC PAIN
TYPE: CHRONIC PAIN

## 2022-01-21 NOTE — PROGRESS NOTES
4 Eyes Skin Assessment Completed    Head WDL  Ears WDL  Nose WDL  Mouth Redness  Neck WDL  Breast/Chest WDL  Shoulder Blades WDL  Spine WDL  (R) Arm/Elbow/Hand Bruising  (L) Arm/Elbow/Hand Redness and Abrasion and Skin Tear  Abdomen WDL  Groin Redness  Scrotum/Coccyx/Buttocks Redness, Blanching and Excoriation  (R) Leg Redness, Scab and Abrasion  (L) Leg Redness, Scab and Abrasion  (R) Heel/Foot/Toe Redness and Blanching  (L) Heel/Foot/Toe Redness and Blanching          Devices In Places Michaels      Interventions In Place Sacral Mepilex    Possible Skin Injury Yes    Pictures Uploaded Into Epic Yes  Wound Consult Placed Yes  RN Wound Prevention Protocol Ordered Yes

## 2022-01-21 NOTE — CONSULTS
".  [facility] Progress Note    Service: [facility] [place]  Patient's Name: Av Bob  MRN: [id number]  Admit Date:1/19/2022  Today's Date: 1/21/2022   Room #: 2211/01      Identification:   Av Bob is a 76 y.o. male patient who presented to the ED with CATA and findings of bilateral hydronephrosis and retention. CT also demonstrated asymmetric left urinary bladder wall thickening, somewhat lobulated (correlate for neoplasm). He was started on flomax and finasteride.      Subjective/ROS:   Yu catheter placed 1/21 with bloody output. I hand irrigated at bedside 1/21 and urine cleared to very light pink with NO clots seen. Patient has small 14F catheter in place so irrigation was slow but no signs of obstruction. He reports that he is doing well today, no new issues. He is on ABX for +Ucx.     Physical Exam:  Current Vitals:   /64   Pulse 72   Temp 36.3 °C (97.4 °F) (Temporal)   Resp 18   Ht 1.854 m (6' 1\")   Wt 94 kg (207 lb 3.7 oz)   SpO2 96%   BMI 27.34 kg/m²     01/19 1900 - 01/21 0659  In: 100 [P.O.:100]  Out: 2560 [Urine:2560]    GEN : NAD, A&O X4   RES:  no acute respiratory distress  ABD: Soft ND, no palpably distended bladder previously noted.   :   No CVA TTP, yu with bloody urine and hand irrigated to clear without clots.     Labs:   Recent Labs     01/18/22  1220 01/19/22  1250 01/20/22  0555   SODIUM 135 133* 137   POTASSIUM 4.8 5.0 4.6   CHLORIDE 103 99 110   CO2 19* 21 18*   GLUCOSE 166* 121* 80   BUN 55* 63* 61*   CREATININE 2.95* 3.68* 3.02*   CALCIUM 9.1 8.9 8.3*     Recent Labs     01/19/22  1250 01/20/22  0555 01/21/22  0402   WBC 11.0* 9.0 10.3   RBC 4.49* 4.13* 4.64*   HEMOGLOBIN 13.0* 11.8* 13.3*   HEMATOCRIT 40.2* 36.5* 40.7*   MCV 89.5 88.4 87.7   MCH 29.0 28.6 28.7   MCHC 32.3* 32.3* 32.7*   RDW 48.6 48.7 47.3   PLATELETCT 354 304 367   MPV 9.4 9.7 9.7     Lab Results   Component Value Date/Time    GLUCOSE 80 01/20/2022 05:55 AM    GLUCOSE 121 " (H) 01/19/2022 12:50 PM    GLUCOSE 166 (H) 01/18/2022 12:20 PM    GLUCOSE 196 (H) 01/13/2022 10:55 AM       Assessment/Plan   Av Bob is a 76 y.o. male patient who presented to the ED with CATA and findings of bilateral hydronephrosis and retention. CT also demonstrated asymmetric left urinary bladder wall thickening, somewhat lobulated (correlate for neoplasm). He was started on flomax and finasteride.      -Continue flomax/finasteride.  -Continue yu catheter, he has a small 14F catheter. His urine was bloody and I hand irrigated to clear today.There were no signs of obstruction and no clots. He will discharge with yu in place  -Continue ABX per medicine and culture results.   -Urology signing off. Patient will have outpatient cystoscopy for evaluation of retention and gross hematuria. We will contact patient regarding this visit.         YUNIOR Lord.-CHA.   [id number] [id number]   [phone]

## 2022-01-21 NOTE — CONSULTS
DATE OF SERVICE:  01/20/2022     REQUESTING PHYSICIAN:  Yina Rodriguez DO     REASON FOR CONSULTATION:  Acute kidney injury on chronic kidney disease, stage   III.     The patient is seen and examined.  Medical records were reviewed.     HISTORY OF PRESENT ILLNESS:  The patient is a 76-year-old gentleman who is a   patient of my associate Dr. Laly Jarvis, has a history of longstanding   diabetes, chronic kidney disease stage III with a creatinine at 1.4-1.8 in   late 2021, presented to the hospital on 01/19 with generalized weakness.  He   had a blood test done as an outpatient and found to have acute kidney injury   on chronic kidney disease, stage III.  The patient has been struggling with   recurrent prostatitis and has been treated with multiple courses of Bactrim by   urology team.     The patient has no recent use of NSAIDs or IV contrast.     During this hospitalization yesterday, the patient started on antibiotic and   had a Michaels catheter placed.     PAST MEDICAL HISTORY:  Significant for:  1.  Diabetes mellitus.  2.  Chronic kidney disease, stage III.  3.  Prostatitis.     ALLERGIES:  The list was reviewed.     SOCIAL HISTORY:  The patient has no smoking history.     FAMILY HISTORY:  Positive for diabetes in his father.     MEDICATIONS:  Reviewed.     REVIEW OF SYSTEMS:  All systems were reviewed, they were negative except   outlined in the history of present illness.     PHYSICAL EXAMINATION:  GENERAL:  The patient appears ill, but no apparent distress.  VITAL SIGNS:  Showed blood pressure of 112/60, heart rate was 75, respiratory   rate was 18.  HEENT:  Normocephalic, atraumatic.  Sclerae are anicteric.  Pupils are   reactive.  Nose normal.  Mucous membranes moist.  NECK:  No lymphadenopathy, no JVD, no thyroid mass.  CHEST:  Normal.  LUNGS:  Clear to auscultation.  HEART:  S1, S2.  ABDOMEN:  Soft, nontender.  No hepatosplenomegaly.  There is no inguinal   lymphadenopathy.  The patient has a Michaels  catheter in place.  EXTREMITIES:  There is trace lower extremity edema.  SKIN:  No skin rash.  NEUROLOGIC:  The patient is alert and oriented x3.  PSYCHIATRIC:  Mood, the patient is anxious.     LABORATORY DATA:  His recent labswere reviewed.  His creatinine is 3.0.     IMAGING STUDIES:  The patient had an abdominal CT scan yesterday without IV   contrast, which showed severe bilateral hydronephrosis.  I did review the CT   scan images myself.     ASSESSMENT:  1.  Acute kidney injury on chronic kidney disease, the etiology is most likely   obstructive uropathy.  2.  Obstructive uropathy.  3.  Anemia.  4.  Prostatitis.  5.  Hypoalbuminemia.  6.  Metabolic acidosis.     PLAN:  1.  There is no acute need for renal replacement therapy.  2.  Agree with Michaels catheter placement.  3.  Renal diet.  4.  Daily labs.  5.  We need to rule out obstructive diuresis and if that is the case, we need   to start the patient on IV fluid.  6.  Prognosis is guarded.     Plan discussed with Dr. Rodriguez.        ______________________________  FADI NAJJAR, MD FN/MILTON/BARBARA    DD:  01/20/2022 15:28  DT:  01/20/2022 17:23    Job#:  [id number]

## 2022-01-21 NOTE — PROGRESS NOTES
Received bedside patient report from LETICIA Aqunio. Patient resting comfortably in bed, no complaints at this time. Safety precautions in place. Will continue to monitor.

## 2022-01-21 NOTE — PROGRESS NOTES
Nephrology Daily Progress Note    Date of Service  1/21/2022    Chief Complaint  76 y.o. male admitted 1/19/2022 with prostatitis, UTI, CATA    Interval Problem Update  Patient is doing better today, kidney function improved with Michaels catheter placement    Review of Systems  Review of Systems   Constitutional: Negative for chills, fever and malaise/fatigue.   Respiratory: Negative for cough and shortness of breath.    Cardiovascular: Negative for chest pain and leg swelling.   Gastrointestinal: Negative for nausea and vomiting.   Genitourinary: Negative for dysuria, frequency and urgency.        Physical Exam  Temp:  [36.1 °C (97 °F)-36.6 °C (97.9 °F)] 36.4 °C (97.6 °F)  Pulse:  [66-75] 66  Resp:  [16-18] 16  BP: (112-135)/(60-73) 124/62  SpO2:  [96 %-100 %] 98 %    Physical Exam  Vitals and nursing note reviewed.   Constitutional:       General: He is not in acute distress.     Appearance: He is not ill-appearing.   HENT:      Head: Normocephalic and atraumatic.      Right Ear: External ear normal.      Left Ear: External ear normal.      Nose: Nose normal.   Eyes:      General:         Right eye: No discharge.         Left eye: No discharge.      Conjunctiva/sclera: Conjunctivae normal.   Cardiovascular:      Rate and Rhythm: Normal rate and regular rhythm.      Heart sounds: No murmur heard.      Pulmonary:      Effort: Pulmonary effort is normal. No respiratory distress.      Breath sounds: Normal breath sounds. No wheezing.   Musculoskeletal:         General: No tenderness or deformity.      Right lower leg: No edema.      Left lower leg: No edema.   Skin:     General: Skin is warm.   Neurological:      General: No focal deficit present.      Mental Status: He is alert and oriented to person, place, and time.   Psychiatric:         Mood and Affect: Mood normal.         Behavior: Behavior normal.         Fluids    Intake/Output Summary (Last 24 hours) at 1/21/2022 1230  Last data filed at 1/21/2022 0240  Gross  per 24 hour   Intake 100 ml   Output 2150 ml   Net -2050 ml       Laboratory  Recent Labs     01/19/22  1250 01/20/22  0555 01/21/22  0402   WBC 11.0* 9.0 10.3   RBC 4.49* 4.13* 4.64*   HEMOGLOBIN 13.0* 11.8* 13.3*   HEMATOCRIT 40.2* 36.5* 40.7*   MCV 89.5 88.4 87.7   MCH 29.0 28.6 28.7   MCHC 32.3* 32.3* 32.7*   RDW 48.6 48.7 47.3   PLATELETCT 354 304 367   MPV 9.4 9.7 9.7     Recent Labs     01/19/22  1250 01/20/22  0555 01/21/22  0402   SODIUM 133* 137 138   POTASSIUM 5.0 4.6 4.1   CHLORIDE 99 110 107   CO2 21 18* 18*   GLUCOSE 121* 80 61*   BUN 63* 61* 51*   CREATININE 3.68* 3.02* 2.39*   CALCIUM 8.9 8.3* 9.0     Recent Labs     01/19/22  1250   INR 1.13     No results for input(s): NTPROBNP in the last 72 hours.        Imaging  CT-RENAL COLIC EVALUATION(A/P W/O)   Final Result      1.  Again seen severe bilateral hydronephrosis extending to the level of the urinary bladder. There is some dense material layering dependently within the renal calyces as well as within the distal left ureter and the urinary bladder which may represent    previously administered iodinated contrast agent.      2.  Bilateral diffuse renal cortical thinning.      3.  Asymmetric left urinary bladder wall thickening which is somewhat lobulated and could potentially represent neoplastic process.      4.  Extensive atherosclerotic vascular calcification.      5.  No evidence of bowel obstruction or focal inflammatory change.      DX-CHEST-PORTABLE (1 VIEW)   Final Result         1. No acute cardiopulmonary abnormalities are identified.            Assessment/Plan  1 CATA on CKD stage III: Secondary to obstructive uropathy  2 obstructive uropathy  3 UTI  no acute need for HD  Renal diet  Daily labs  Renal dose all meds  Avoid nephrotoxins

## 2022-01-21 NOTE — PROGRESS NOTES
Hospital Medicine Daily Progress Note    Date of Service  1/21/2022    Chief Complaint  Av Bob is a 76 y.o. male admitted 1/19/2022 with generalized weakness.     Hospital Course  76 y.o. male with a past medical history of diabetes, hypertension and chronic prostatitis with recurrent infections who presented 1/19/2022 with generalized weakness.  Patient reports that he has been having progressively worsening generalized weakness over the past 2 weeks.  He has been evaluated by his primary care yesterday who noticed evidence of acute kidney injury on his labs, accordingly was sent to the emergency room.  Patient has been having recurrent prostatitis and was multiple courses of Bactrim with urology evaluations.  Patient admitted started on IV antibiotics.  Urology was consulted recommended Michaels placement.  Patient also with acute kidney injury, nephrology consulted.     Interval Problem Update  Vital signs stable on room air.  Urine culture again positive for Proteus.  Renal function improving.  Pending PT/OT for discharge planning. Patient's glucose low this morning but he did not eat breakfast.  Patient states overall he is feeling better just still feels weak.  Wife at bedside and updated on plan of care, she prefers to take patient home with home health but is willing to see what therapy recommends.    I have personally seen and examined the patient at bedside. I discussed the plan of care with patient, bedside RN, charge RN and .    Consultants/Specialty  nephrology and urology    Code Status  Full Code    Disposition  Patient is not medically cleared for discharge.   Anticipate discharge to to home with close outpatient follow-up.  I have placed the appropriate orders for post-discharge needs.    Review of Systems  Review of Systems   Constitutional: Positive for malaise/fatigue. Negative for chills and fever.   Respiratory: Negative for shortness of breath.    Cardiovascular:  Negative for chest pain.   Gastrointestinal: Negative for abdominal pain, nausea and vomiting.   Genitourinary: Negative for dysuria.   Musculoskeletal: Negative for myalgias.   Skin: Negative for rash.   Neurological: Negative for dizziness and headaches.   Psychiatric/Behavioral: Negative for depression.   All other systems reviewed and are negative.       Physical Exam  Temp:  [36.1 °C (97 °F)-36.6 °C (97.9 °F)] 36.4 °C (97.6 °F)  Pulse:  [66-75] 66  Resp:  [16-18] 16  BP: (112-135)/(60-73) 124/62  SpO2:  [96 %-100 %] 98 %    Physical Exam  Vitals and nursing note reviewed.   Constitutional:       General: He is not in acute distress.     Appearance: Normal appearance. He is ill-appearing (chronically).   HENT:      Head: Normocephalic and atraumatic.   Eyes:      Conjunctiva/sclera: Conjunctivae normal.   Cardiovascular:      Rate and Rhythm: Normal rate and regular rhythm.      Pulses: Normal pulses.   Pulmonary:      Effort: Pulmonary effort is normal. No respiratory distress.      Breath sounds: Normal breath sounds. No wheezing.   Abdominal:      General: Abdomen is flat. There is no distension.      Palpations: Abdomen is soft.      Tenderness: There is no abdominal tenderness.   Musculoskeletal:         General: No swelling. Normal range of motion.      Cervical back: Normal range of motion and neck supple.      Comments: Left arm contracture   Skin:     General: Skin is warm and dry.      Findings: No rash.   Neurological:      General: No focal deficit present.      Mental Status: He is alert and oriented to person, place, and time.      Cranial Nerves: No cranial nerve deficit.      Motor: Weakness (left arm, LEs) present.   Psychiatric:         Mood and Affect: Mood normal.         Behavior: Behavior normal.         Fluids    Intake/Output Summary (Last 24 hours) at 1/21/2022 1255  Last data filed at 1/21/2022 0240  Gross per 24 hour   Intake 100 ml   Output 2150 ml   Net -2050 ml        Laboratory  Recent Labs     01/19/22  1250 01/20/22  0555 01/21/22  0402   WBC 11.0* 9.0 10.3   RBC 4.49* 4.13* 4.64*   HEMOGLOBIN 13.0* 11.8* 13.3*   HEMATOCRIT 40.2* 36.5* 40.7*   MCV 89.5 88.4 87.7   MCH 29.0 28.6 28.7   MCHC 32.3* 32.3* 32.7*   RDW 48.6 48.7 47.3   PLATELETCT 354 304 367   MPV 9.4 9.7 9.7     Recent Labs     01/19/22  1250 01/20/22  0555 01/21/22  0402   SODIUM 133* 137 138   POTASSIUM 5.0 4.6 4.1   CHLORIDE 99 110 107   CO2 21 18* 18*   GLUCOSE 121* 80 61*   BUN 63* 61* 51*   CREATININE 3.68* 3.02* 2.39*   CALCIUM 8.9 8.3* 9.0     Recent Labs     01/19/22  1250   INR 1.13               Imaging  CT-RENAL COLIC EVALUATION(A/P W/O)   Final Result      1.  Again seen severe bilateral hydronephrosis extending to the level of the urinary bladder. There is some dense material layering dependently within the renal calyces as well as within the distal left ureter and the urinary bladder which may represent    previously administered iodinated contrast agent.      2.  Bilateral diffuse renal cortical thinning.      3.  Asymmetric left urinary bladder wall thickening which is somewhat lobulated and could potentially represent neoplastic process.      4.  Extensive atherosclerotic vascular calcification.      5.  No evidence of bowel obstruction or focal inflammatory change.      DX-CHEST-PORTABLE (1 VIEW)   Final Result         1. No acute cardiopulmonary abnormalities are identified.           Assessment/Plan  Essential hypertension, benign- (present on admission)  Assessment & Plan  Resume home amlodipine, and metoprolol parameters  I will hold home losartan for now given his acute kidney injury    Acute cystitis without hematuria- (present on admission)  Assessment & Plan  Complicated urinary tract infection  Uro Dr Tanner, consulted, recommended yu & treating urinary tract infection with ceftriaxone    Urine cultures- proteus   Continue abx     GERD with esophagitis- (present on  admission)  Assessment & Plan  Resume home omeprazole    Acute kidney injury (HCC)- (present on admission)  Assessment & Plan  Imaging shows evidence for severe bilateral hydronephrosis extending to the level of the urinary bladder  Likely multifactorial secondary to obstructive uropathy and dehydration.  Intravenous fluids  Avoid nephrotoxins, monitor inputs and outputs  Uro Dr Tanner, consulted, recommended yu & treating urinary tract infection with ceftriaxone    Nephrology consulted  -Follows with Dr. Jarvis outpatient     improving    Type 2 diabetes mellitus, with long-term current use of insulin (HCC)- (present on admission)  Assessment & Plan  Without significant hyperglycemia  Last glycated hemoglobin was 7.1 %  Will resume home long acting insulin & start short acting insulin  Accu-Checks, hypoglycemia protocol     Stage 4 chronic kidney disease (HCC)- (present on admission)  Assessment & Plan  With acute kidney injury.  Avoid nephrotoxins as much as possible, renally dose medications, monitor inputs and outputs        VTE prophylaxis: SCDs/TEDs    I have performed a physical exam and reviewed and updated ROS and Plan today (1/21/2022). In review of yesterday's note (1/20/2022), there are no changes except as documented above.

## 2022-01-21 NOTE — DISCHARGE PLANNING
Anticipated Discharge Disposition:   TBD, home with HH vs SNF    Action:   Chart review complete.     Per chart review, SNF choice was collected yesterday for [facility], and [facility]. PT/OT to evaluate patient. If PT/OT recommends SNF, then referrals will be sent out.     If PT/OT recommends HH, RN CM will collect choice. The family and patient would like [facility]; however, that is not an option due to be being non-contracted.     Barriers to Discharge:   PT/OT   Medical Clearance    Plan:   Hospital care management will continue to assist with discharge planning needs.

## 2022-01-21 NOTE — WOUND TEAM
Wound team consulted for L elbow wound.  Pt has a skin tear on the L elbow that appears to be progressing well based on previous picture.  Cleaned with saline and gauze, covered with silicone foam.  Nursing orders written.  Wound team will sign off.  Please re consult with any future needs.

## 2022-01-21 NOTE — CARE PLAN
The patient is Stable - Low risk of patient condition declining or worsening    Shift Goals  Clinical Goals: Rest and sleep  Patient Goals: Rest and sleep    Progress made toward(s) clinical / shift goals:    Problem: Knowledge Deficit - Standard  Goal: Patient and family/care givers will demonstrate understanding of plan of care, disease process/condition, diagnostic tests and medications  Outcome: Progressing  Note: Patient updated on the plan of care. Encouraged to voice feelings and to ask questions. Answered all questions and concerns. Agrees with the plan of care.      Problem: Fall Risk  Goal: Patient will remain free from falls  Outcome: Progressing  Note: Safety precautions in place. Bed alarm ON. Will continue to monitor patient.        Patient is not progressing towards the following goals:

## 2022-01-21 NOTE — PROGRESS NOTES
Received report from night RN, assumed care. POC discussed. A&Ox4, denies pain. Bed in lowest position with call light and belongings within reach.

## 2022-01-21 NOTE — DISCHARGE PLANNING
Diagnosis medical debility . Ongoing medical management as well as potential therapy need. Anticipate post acute services to facilitate a successful transition to community. Discharge support spouse. Please consider a PMR referral to assist with plan of care. Medicare provider. Pending PT/OT evaluations.

## 2022-01-22 VITALS
SYSTOLIC BLOOD PRESSURE: 126 MMHG | WEIGHT: 194.22 LBS | BODY MASS INDEX: 25.74 KG/M2 | TEMPERATURE: 97.4 F | HEIGHT: 73 IN | HEART RATE: 71 BPM | OXYGEN SATURATION: 97 % | RESPIRATION RATE: 17 BRPM | DIASTOLIC BLOOD PRESSURE: 74 MMHG

## 2022-01-22 PROBLEM — N39.0 UTI (URINARY TRACT INFECTION): Status: RESOLVED | Noted: 2022-01-19 | Resolved: 2022-01-22

## 2022-01-22 PROBLEM — N17.9 ACUTE KIDNEY INJURY (HCC): Status: RESOLVED | Noted: 2018-01-24 | Resolved: 2022-01-22

## 2022-01-22 LAB
ANION GAP SERPL CALC-SCNC: 10 MMOL/L (ref 7–16)
BUN SERPL-MCNC: 45 MG/DL (ref 8–22)
CALCIUM SERPL-MCNC: 8.4 MG/DL (ref 8.4–10.2)
CHLORIDE SERPL-SCNC: 107 MMOL/L (ref 96–112)
CO2 SERPL-SCNC: 18 MMOL/L (ref 20–33)
CREAT SERPL-MCNC: 1.86 MG/DL (ref 0.5–1.4)
GLUCOSE BLD-MCNC: 101 MG/DL (ref 65–99)
GLUCOSE BLD-MCNC: 68 MG/DL (ref 65–99)
GLUCOSE BLD-MCNC: 92 MG/DL (ref 65–99)
GLUCOSE SERPL-MCNC: 99 MG/DL (ref 65–99)
POTASSIUM SERPL-SCNC: 4.7 MMOL/L (ref 3.6–5.5)
SODIUM SERPL-SCNC: 135 MMOL/L (ref 135–145)

## 2022-01-22 PROCEDURE — 99214 OFFICE O/P EST MOD 30 MIN: CPT | Performed by: INTERNAL MEDICINE

## 2022-01-22 PROCEDURE — 700102 HCHG RX REV CODE 250 W/ 637 OVERRIDE(OP): Performed by: HOSPITALIST

## 2022-01-22 PROCEDURE — 99217 PR OBSERVATION CARE DISCHARGE: CPT | Performed by: INTERNAL MEDICINE

## 2022-01-22 PROCEDURE — A9270 NON-COVERED ITEM OR SERVICE: HCPCS | Performed by: HOSPITALIST

## 2022-01-22 PROCEDURE — 700111 HCHG RX REV CODE 636 W/ 250 OVERRIDE (IP): Performed by: HOSPITALIST

## 2022-01-22 PROCEDURE — 94760 N-INVAS EAR/PLS OXIMETRY 1: CPT

## 2022-01-22 PROCEDURE — 82962 GLUCOSE BLOOD TEST: CPT

## 2022-01-22 PROCEDURE — 700102 HCHG RX REV CODE 250 W/ 637 OVERRIDE(OP): Performed by: INTERNAL MEDICINE

## 2022-01-22 PROCEDURE — 96372 THER/PROPH/DIAG INJ SC/IM: CPT

## 2022-01-22 PROCEDURE — 97166 OT EVAL MOD COMPLEX 45 MIN: CPT

## 2022-01-22 PROCEDURE — 36415 COLL VENOUS BLD VENIPUNCTURE: CPT

## 2022-01-22 PROCEDURE — G0378 HOSPITAL OBSERVATION PER HR: HCPCS

## 2022-01-22 PROCEDURE — 97162 PT EVAL MOD COMPLEX 30 MIN: CPT

## 2022-01-22 PROCEDURE — 80048 BASIC METABOLIC PNL TOTAL CA: CPT

## 2022-01-22 PROCEDURE — A9270 NON-COVERED ITEM OR SERVICE: HCPCS | Performed by: INTERNAL MEDICINE

## 2022-01-22 RX ORDER — CEFDINIR 300 MG/1
300 CAPSULE ORAL EVERY 12 HOURS
Qty: 8 CAPSULE | Refills: 0 | Status: SHIPPED | OUTPATIENT
Start: 2022-01-22 | End: 2022-01-26

## 2022-01-22 RX ADMIN — INSULIN GLARGINE 24 UNITS: 100 INJECTION, SOLUTION SUBCUTANEOUS at 18:00

## 2022-01-22 RX ADMIN — TAMSULOSIN HYDROCHLORIDE 0.4 MG: 0.4 CAPSULE ORAL at 04:18

## 2022-01-22 RX ADMIN — HEPARIN SODIUM 5000 UNITS: 5000 INJECTION, SOLUTION INTRAVENOUS; SUBCUTANEOUS at 11:07

## 2022-01-22 RX ADMIN — FLUOXETINE 40 MG: 20 CAPSULE ORAL at 04:18

## 2022-01-22 RX ADMIN — ASPIRIN 81 MG: 81 TABLET, COATED ORAL at 04:18

## 2022-01-22 RX ADMIN — Medication 2000 UNITS: at 04:18

## 2022-01-22 RX ADMIN — CEFDINIR 300 MG: 300 CAPSULE ORAL at 16:33

## 2022-01-22 RX ADMIN — ATORVASTATIN CALCIUM 80 MG: 40 TABLET, FILM COATED ORAL at 16:33

## 2022-01-22 RX ADMIN — OMEPRAZOLE 20 MG: 20 CAPSULE, DELAYED RELEASE ORAL at 04:18

## 2022-01-22 RX ADMIN — CEFDINIR 300 MG: 300 CAPSULE ORAL at 05:03

## 2022-01-22 RX ADMIN — LOSARTAN POTASSIUM 50 MG: 25 TABLET, FILM COATED ORAL at 16:33

## 2022-01-22 ASSESSMENT — GAIT ASSESSMENTS
ASSISTIVE DEVICE: QUAD CANE
GAIT LEVEL OF ASSIST: UNABLE TO PARTICIPATE
DISTANCE (FEET): 0

## 2022-01-22 ASSESSMENT — COGNITIVE AND FUNCTIONAL STATUS - GENERAL
DRESSING REGULAR UPPER BODY CLOTHING: A LOT
PERSONAL GROOMING: A LITTLE
MOBILITY SCORE: 10
TURNING FROM BACK TO SIDE WHILE IN FLAT BAD: A LOT
SUGGESTED CMS G CODE MODIFIER MOBILITY: CL
DAILY ACTIVITIY SCORE: 11
DRESSING REGULAR LOWER BODY CLOTHING: TOTAL
HELP NEEDED FOR BATHING: TOTAL
SUGGESTED CMS G CODE MODIFIER DAILY ACTIVITY: CL
STANDING UP FROM CHAIR USING ARMS: A LOT
WALKING IN HOSPITAL ROOM: TOTAL
TOILETING: TOTAL
EATING MEALS: A LITTLE
CLIMB 3 TO 5 STEPS WITH RAILING: TOTAL
MOVING TO AND FROM BED TO CHAIR: A LOT
MOVING FROM LYING ON BACK TO SITTING ON SIDE OF FLAT BED: A LOT

## 2022-01-22 ASSESSMENT — ENCOUNTER SYMPTOMS
CHILLS: 0
ABDOMINAL PAIN: 0
NAUSEA: 0
DEPRESSION: 0
DIZZINESS: 0
COUGH: 0
HEADACHES: 0
MYALGIAS: 0
FEVER: 0
VOMITING: 0
SHORTNESS OF BREATH: 0

## 2022-01-22 ASSESSMENT — FIBROSIS 4 INDEX: FIB4 SCORE: 1.32

## 2022-01-22 ASSESSMENT — ACTIVITIES OF DAILY LIVING (ADL): TOILETING: REQUIRES ASSIST

## 2022-01-22 NOTE — PROGRESS NOTES
Received bedside patient report from LETICIA Melo. Patient resting comfortably in bed, no complaints at this time. Safety precautions in place. Will continue to monitor.

## 2022-01-22 NOTE — DISCHARGE SUMMARY
Discharge Summary    CHIEF COMPLAINT ON ADMISSION  Chief Complaint   Patient presents with   • Weakness     Visitor states is getting progressively more weak   • Abnormal Labs     Visitor states received a call from PCP this am for abnormal Creat level       Reason for Admission  Sent by MD; Abnormal Labs     Admission Date  1/19/2022    CODE STATUS  Prior    HPI & HOSPITAL COURSE  This is a 76 y.o. male here with weakness.     76 y.o. male with a past medical history of diabetes, hypertension and chronic prostatitis with recurrent infections who presented 1/19/2022 with generalized weakness.  Patient reports that he has been having progressively worsening generalized weakness over the past 2 weeks.  He has been evaluated by his primary care yesterday who noticed evidence of acute kidney injury on his labs, accordingly was sent to the emergency room.  Patient has been having recurrent prostatitis and was multiple courses of Bactrim with urology evaluations.  Patient admitted started on IV antibiotics.  Urology was consulted recommended Yu placement.  Patient also with acute kidney injury, nephrology consulted. Kidney function improved after yu placement. Hematuria resolved with antibiotics and urology cleared patient to leave with the yu and follow up in the outpatient office for cystoscopy for further evaluation of bladder wall thickening seen on CT. PT/OT evaluated the patient and recommended home health therapy which has been set up for the patient. Patient's vital signs are stable and he is ready for discharge with oral antibiotics to finish his course for UTI. He is to return to the er if his condition worsens.     Therefore, he is discharged in fair and stable condition to home with organized home healthcare and close outpatient follow-up.    Discharge Date  1/22/2022    FOLLOW UP ITEMS POST DISCHARGE  Follow up with urology   Follow up with nephrology   Follow up with primary care physician.      DISCHARGE DIAGNOSES  Principal Problem (Resolved):    UTI (urinary tract infection) POA: Yes  Active Problems:    Stage 4 chronic kidney disease (HCC) POA: Yes      Overview: He has a history of chronic kidney disease related to nephrolithiasis,       recurrent UTIs, and BPH as well as history of hypertension and diabetes.        He is following with nephrology, Dr. Jarvis.             Spironolactone was discontinued due to worsening chronic kidney disease.            Other current renally excreted medications include losartan 50 mg daily,       potassium chloride 8 mEq daily, and Toujeo/Humalog.            Recent declining kidney function related to prostatitis, creatinine up to       2.6 and now slowly trending down, last check was at 2.3.    Type 2 diabetes mellitus, with long-term current use of insulin ([facility]) POA: Yes      Overview:        Ref. Range 1/13/2022 10:43       Glycohemoglobin Latest Ref Range: 0.0 - 5.6 % 7.1 (A)              Ref. Range 10/6/2021 11:38       Micro Alb Creat Ratio Latest Ref Range: 0 - 30 mg/g 42 (H)             Longstanding history of type 2 diabetes on insulin.  Previously followed       with endocrinology, Dr. Rasheed.             Current regimen includes Trulicity 3 mg weekly,Toujeo 24 units daily,       Humalog 5 units for sugars between 101-150, increase by 2 units if greater       than 150.  Correction dosage is 2: 50 greater than 150.            Complicated by retinal involvement, neuropathy, CKDIII, and       microalbuminuria.          GERD with esophagitis POA: Yes    Acute cystitis without hematuria POA: Yes    Essential hypertension, benign POA: Yes  Resolved Problems:    Acute kidney injury (HCC) POA: Yes      Overview: He developed CATA on CKD in December 2021 related to prostate infection.        Creatinine up to 2.6, baseline closer to 1.4.  Patient was recently       treated with Bactrim at full dose and then was reinitiated on Bactrim at       renal dosing.   Creatinine has since trended from 2.6-2.3-2.2, last check       was on January 9.  Continues to have hematuria.  No recent white blood       cell count.      FOLLOW UP  Future Appointments   Date Time Provider Department Center   4/14/2022  2:20 PM Madison Bunch D.O. [facility] None   5/4/2022  1:20 PM Osvaldo Tucker M.D. [facility] None     Merlin Ogden M.D.  [address]  [place] [address]  [phone]      Follow up outpatient with urology for further yu catheter management     Madison Bunch D.O.  [address]  [address]  [place] [id number] [phone]      Follow up with primary care in 1-2 weeks     [facility]  [phone]  In 1 day   will call to coordinate start of care.      MEDICATIONS ON DISCHARGE     Medication List      START taking these medications      Instructions   cefdinir 300 MG Caps  Commonly known as: OMNICEF   Take 1 Capsule by mouth every 12 hours for 4 days.  Dose: 300 mg        CHANGE how you take these medications      Instructions   allopurinol 100 MG Tabs  What changed: when to take this  Commonly known as: ZYLOPRIM   TAKE 1 TABLET BY MOUTH EVERY DAY     clopidogrel 75 MG Tabs  What changed: when to take this  Commonly known as: PLAVIX   Take 1 Tablet by mouth every day.  Dose: 75 mg     fenofibrate 145 MG Tabs  What changed: when to take this  Commonly known as: TRICOR   Take 1 Tablet by mouth every day.  Dose: 145 mg     finasteride 5 MG Tabs  What changed: when to take this  Commonly known as: PROSCAR   Take 1 Tab by mouth every day.  Dose: 5 mg     fluoxetine 40 MG capsule  What changed: when to take this  Commonly known as: PROZAC   TAKE 1 CAPSULE BY MOUTH EVERY DAY     gabapentin 100 MG Caps  What changed: See the new instructions.  Commonly known as: NEURONTIN   TAKE 1 TO 3 CAPSULES BY MOUTH AT BEDTIME AS NEEDED FOR PAIN     metoprolol  MG Tb24  What changed: when to take this  Commonly known as: TOPROL XL   Take 1 Tablet by mouth every  day.  Dose: 100 mg     Toujeo SoloStar 300 UNIT/ML Sopn  What changed:   · how much to take  · additional instructions  Generic drug: Insulin Glargine (1 Unit Dial)   Doctor's comments: Please dispense 3 month supply with refills  Inject 24 Units under the skin every day.  Dose: 24 Units     Trulicity 3 MG/0.5ML Sopn  What changed: when to take this  Generic drug: Dulaglutide   Inject 0.5 mL under the skin every Friday.  Dose: 0.5 mL        CONTINUE taking these medications      Instructions   acetaminophen 500 MG Tabs  Commonly known as: TYLENOL   Take 1,000 mg by mouth every 6 hours as needed for Moderate Pain.  Dose: 1,000 mg     Admelog SoloStar 100 UNIT/ML Sopn injection PEN  Generic drug: insulin lispro   Inject 5-9 Units under the skin 2 times daily, before breakfast and dinner. Start at 5 units then 2 units for every 50 points over 150  Dose: 5-9 Units     alfuzosin 10 MG SR tablet  Commonly known as: UROXATRAL   Take 10 mg by mouth every day.  Dose: 10 mg     amLODIPine 5 MG Tabs  Commonly known as: NORVASC   Take 1 Tablet by mouth every day.  Dose: 5 mg     aspirin EC 81 MG Tbec  Commonly known as: ECOTRIN   Take 81 mg by mouth every day.  Dose: 81 mg     atorvastatin 80 MG tablet  Commonly known as: LIPITOR   Take 1 Tablet by mouth every evening.  Dose: 80 mg     D3 2000 UNIT Tabs  Generic drug: Cholecalciferol   Take 2,000 Units by mouth every day.  Dose: 2,000 Units     losartan 50 MG Tabs  Commonly known as: COZAAR   Take 1 Tablet by mouth 2 times a day.  Dose: 50 mg     magnesium oxide 400 MG Tabs tablet  Commonly known as: MAG-OX   Take 800 mg by mouth 2 times a day.  Dose: 800 mg     methenamine hip 1 GM Tabs  Commonly known as: HIPPREX   Take 1 g by mouth 2 times a day.  Dose: 1 g     MULTI VITAMIN MENS PO   Take 1 Tablet by mouth every day.  Dose: 1 Tablet     omeprazole 20 MG delayed-release capsule  Commonly known as: PRILOSEC   Take 20 mg by mouth every day.  Dose: 20 mg     potassium chloride  8 MEQ tablet  Commonly known as: KLOR-CON   Take 1 Tablet by mouth every day.  Dose: 8 mEq     PROBIOTIC DAILY PO   Take 1 Cap by mouth 2 Times a Day.  Dose: 1 Capsule        STOP taking these medications    sulfamethoxazole-trimethoprim 400-80 MG Tabs  Commonly known as: BACTRIM            Allergies  Allergies   Allergen Reactions   • Diphenhydramine Hcl Anxiety     Pt is able to tolerate  Mg benadryl with less anxiety   • Lorazepam Unspecified     Disorientation   • Ciprofloxacin      Rash,stomach ache   • Spironolactone      Acute kidney injury       DIET  No orders of the defined types were placed in this encounter.      ACTIVITY  As tolerated.  Weight bearing as tolerated    CONSULTATIONS  Urology   Nephrology     PROCEDURES  N/A    LABORATORY  Lab Results   Component Value Date    SODIUM 135 01/22/2022    POTASSIUM 4.7 01/22/2022    CHLORIDE 107 01/22/2022    CO2 18 (L) 01/22/2022    GLUCOSE 99 01/22/2022    BUN 45 (H) 01/22/2022    CREATININE 1.86 (H) 01/22/2022    CREATININE 1.4 05/28/2008        Lab Results   Component Value Date    WBC 10.3 01/21/2022    HEMOGLOBIN 13.3 (L) 01/21/2022    HEMATOCRIT 40.7 (L) 01/21/2022    PLATELETCT 367 01/21/2022        Total time of the discharge process exceeds 38 minutes.

## 2022-01-22 NOTE — PROGRESS NOTES
Hypoglycemia Intervention    Hypoglycemia protocol intervention:  Blood glucose 53 at 1035.  Intervention: 4 oz of fruit juice   Repeat blood glucose 1059 at 51 .  Intervention: 4 oz of fruit juice   Additional interventions needed: BG 56 at 1124, given 4 oz fruit juice  BG 64 at 1148 4 oz fruit juice and lunch given  BG 94 at 1215 continued with lunch   at 1243   at 1344  Dr. Rodriguez notified of the above at 1040.

## 2022-01-22 NOTE — PROGRESS NOTES
Nephrology Daily Progress Note    Date of Service  1/22/2022    Chief Complaint  76 y.o. male admitted 1/19/2022 with prostatitis, UTI, CATA    Interval Problem Update  Patient is doing better today, kidney function improved with Michaels catheter placement  1/22 patient is doing better, anxious to go home  Review of Systems  Review of Systems   Constitutional: Negative for chills, fever and malaise/fatigue.   Respiratory: Negative for cough and shortness of breath.    Cardiovascular: Negative for chest pain and leg swelling.   Gastrointestinal: Negative for nausea and vomiting.   Genitourinary: Negative for dysuria, frequency and urgency.        Physical Exam  Temp:  [36.3 °C (97.3 °F)-36.7 °C (98 °F)] 36.3 °C (97.4 °F)  Pulse:  [62-82] 67  Resp:  [16-18] 18  BP: (124-136)/(61-74) 126/74  SpO2:  [98 %-99 %] 98 %    Physical Exam  Vitals and nursing note reviewed.   Constitutional:       General: He is not in acute distress.     Appearance: He is not ill-appearing.   HENT:      Head: Normocephalic and atraumatic.      Right Ear: External ear normal.      Left Ear: External ear normal.      Nose: Nose normal.   Eyes:      General:         Right eye: No discharge.         Left eye: No discharge.      Conjunctiva/sclera: Conjunctivae normal.   Cardiovascular:      Rate and Rhythm: Normal rate and regular rhythm.      Heart sounds: No murmur heard.      Pulmonary:      Effort: Pulmonary effort is normal. No respiratory distress.      Breath sounds: Normal breath sounds. No wheezing.   Musculoskeletal:         General: No tenderness or deformity.      Right lower leg: No edema.      Left lower leg: No edema.   Skin:     General: Skin is warm.   Neurological:      General: No focal deficit present.      Mental Status: He is alert and oriented to person, place, and time.   Psychiatric:         Mood and Affect: Mood normal.         Behavior: Behavior normal.         Fluids    Intake/Output Summary (Last 24 hours) at 1/22/2022  1353  Last data filed at 1/22/2022 0500  Gross per 24 hour   Intake 480 ml   Output 1950 ml   Net -1470 ml       Laboratory  Recent Labs     01/20/22  0555 01/21/22  0402   WBC 9.0 10.3   RBC 4.13* 4.64*   HEMOGLOBIN 11.8* 13.3*   HEMATOCRIT 36.5* 40.7*   MCV 88.4 87.7   MCH 28.6 28.7   MCHC 32.3* 32.7*   RDW 48.7 47.3   PLATELETCT 304 367   MPV 9.7 9.7     Recent Labs     01/20/22  0555 01/21/22  0402 01/22/22  0517   SODIUM 137 138 135   POTASSIUM 4.6 4.1 4.7   CHLORIDE 110 107 107   CO2 18* 18* 18*   GLUCOSE 80 61* 99   BUN 61* 51* 45*   CREATININE 3.02* 2.39* 1.86*   CALCIUM 8.3* 9.0 8.4         No results for input(s): NTPROBNP in the last 72 hours.        Imaging  CT-RENAL COLIC EVALUATION(A/P W/O)   Final Result      1.  Again seen severe bilateral hydronephrosis extending to the level of the urinary bladder. There is some dense material layering dependently within the renal calyces as well as within the distal left ureter and the urinary bladder which may represent    previously administered iodinated contrast agent.      2.  Bilateral diffuse renal cortical thinning.      3.  Asymmetric left urinary bladder wall thickening which is somewhat lobulated and could potentially represent neoplastic process.      4.  Extensive atherosclerotic vascular calcification.      5.  No evidence of bowel obstruction or focal inflammatory change.      DX-CHEST-PORTABLE (1 VIEW)   Final Result         1. No acute cardiopulmonary abnormalities are identified.            Assessment/Plan  1 CATA on CKD stage III: Secondary to obstructive uropathy, improved  2 obstructive uropathy  3 UTI  no need for HD  Renal diet  Renal dose all meds  Avoid nephrotoxins  Okay to discharge from renal point  We will sign off the case please call if needed

## 2022-01-22 NOTE — FACE TO FACE
Face to Face Supporting Documentation - Home Health    The encounter with this patient was in whole or in part the primary reason for home health admission.    Date of encounter:   Patient:                    MRN:                       YOB: 2020  Av Bob  [id number]  1945     Home health to see patient for:  Skilled Nursing care for assessment, interventions & education, Home health aide, Physical Therapy evaluation and treatment and Occupational therapy evaluation and treatment    Skilled need for:  Recent Deterioration of Health Status urinary retention     Skilled nursing interventions to include:  Line/Drain/Airway education and care    Homebound status evidenced by:  Need the aid of supportive devices such as crutches, canes, wheelchairs or walkers or Needs the assistance of another person in order to leave the home. Leaving home requires a considerable and taxing effort. There is a normal inability to leave the home.    Community Physician to provide follow up care: Madison Bunch D.O.     Optional Interventions? No      I certify the face to face encounter for this home health care referral meets the CMS requirements and the encounter/clinical assessment with the patient was, in whole, or in part, for the medical condition(s) listed above, which is the primary reason for home health care. Based on my clinical findings: the service(s) are medically necessary, support the need for home health care, and the homebound criteria are met.  I certify that this patient has had a face to face encounter by myself.  Yina Rodriguez D.O. - NPI: [id number]

## 2022-01-22 NOTE — THERAPY
Physical Therapy   Initial Evaluation     Patient Name: Av Bob  Age:  76 y.o., Sex:  male  Medical Record #: [id number]  Today's Date: 1/22/2022     Precautions  Precautions: (P) Fall Risk    Assessment  Patient is 76 y.o. male with a diagnosis of CVA, UTI,weakness Pt lives at home with wife and requires assistance with bed mob,transfers and ambulation.Has not ambulated this last week with increasing weakness.Acute PT needed to improve bed mob,transfers and ambulation     Plan    Recommend Physical Therapy 4 times per week until therapy goals are met for the following treatments:  Bed Mobility, Gait Training, Neuro Re-Education / Balance, Therapeutic Activities and Therapeutic Exercises      01/22/22 1300   Total Time Spent   Total Time Spent (Mins) 30   Charge Group   PT Evaluation PT Evaluation Mod   Initial Contact Note    Initial Contact Note Order Received and Verified, Physical Therapy Evaluation in Progress with Full Report to Follow.   Precautions   Precautions Fall Risk   Pain 0 - 10 Group   Therapist Pain Assessment 0   Prior Living Situation   Prior Services Continuous (24 Hour) Care Giving Family   Housing / Facility 1 Story House   Steps Into Home 0   Steps In Home 0   Equipment Owned Quad Cane;Wheelchair   Lives with - Patient's Self Care Capacity Spouse   Prior Level of Functional Mobility   Bed Mobility Required Assist   Transfer Status Required Assist   Ambulation Required Assist   Distance Ambulation (Feet)   (20 feet)   Assistive Devices Used Quad Cane   History of Falls   History of Falls Yes   Cognition    Cognition / Consciousness WDL   Level of Consciousness Alert   Passive ROM Lower Body   Passive ROM Lower Body X   Active ROM Lower Body    Active ROM Lower Body  X   Strength Lower Body   Lower Body Strength  X   Coordination Lower Body    Coordination Lower Body  X   Balance Assessment   Sitting Balance (Static) Fair   Sitting Balance (Dynamic) Fair   Standing Balance (Static)  Poor   Standing Balance (Dynamic) Poor   Weight Shift Sitting Poor   Weight Shift Standing Absent   Gait Analysis   Gait Level Of Assist Unable to Participate   Assistive Device Quad Cane   Distance (Feet) 0   Weight Bearing Status full   Bed Mobility    Supine to Sit Moderate Assist   Sit to Supine Moderate Assist   Scooting Maximal Assist   Functional Mobility   Sit to Stand Moderate Assist   Bed, Chair, Wheelchair Transfer Moderate Assist   How much difficulty does the patient currently have...   Turning over in bed (including adjusting bedclothes, sheets and blankets)? 2   Sitting down on and standing up from a chair with arms (e.g., wheelchair, bedside commode, etc.) 2   Moving from lying on back to sitting on the side of the bed? 2   How much help from another person does the patient currently need...   Moving to and from a bed to a chair (including a wheelchair)? 2   Need to walk in a hospital room? 1   Climbing 3-5 steps with a railing? 1   6 clicks Mobility Score 10   Activity Tolerance   Sitting Edge of Bed 10   Standing 2 x 2 mins   Patient / Family Goals    Patient / Family Goal #1 Home   Short Term Goals    Short Term Goal # 1 Min A bed mob in 6 V   Short Term Goal # 2 Min A transfers in 6 V   Short Term Goal # 3 Min A ambulation x 10 feet in 6 V   Problem List    Problems Impaired Bed Mobility;Impaired Transfers;Impaired Ambulation;Functional ROM Deficit;Functional Strength Deficit;Impaired Balance;Decreased Activity Tolerance   Anticipated Discharge Equipment and Recommendations   DC Equipment Recommendations Unable to determine at this time   Discharge Recommendations   (wife would like to take him home and feels like she can cope)   Interdisciplinary Plan of Care Collaboration   IDT Collaboration with  Nursing   Session Information   Date / Session Number  1/22-1 1/4 1/28       DC Equipment Recommendations: (P) Unable to determine at this time  Discharge Recommendations: (P)  (wife would like to  take him home and feels like she can cope)

## 2022-01-22 NOTE — CARE PLAN
The patient is Stable - Low risk of patient condition declining or worsening    Shift Goals  Clinical Goals: Free from falls and injury  Patient Goals: Free from falls and injury  Family Goals: Free from falls and injury    Progress made toward(s) clinical / shift goals:    Problem: Knowledge Deficit - Standard  Goal: Patient and family/care givers will demonstrate understanding of plan of care, disease process/condition, diagnostic tests and medications  Outcome: Progressing  Note: Patient updated on the plan of care. Encouraged to voice feelings and to ask questions. Answered all questions and concerns. Agrees with the plan of care.      Problem: Fall Risk  Goal: Patient will remain free from falls  Outcome: Progressing  Note: Safety precautions in place. Bed alarm ON. Will continue to monitor patient.        Patient is not progressing towards the following goals:

## 2022-01-22 NOTE — DISCHARGE PLANNING
ATTN: Case Management  RE: Referral for Home Health    Reason for referral denial: Unable to accept today due to insurance capacity - We are rapidly assessing our census for discharge opportunities.  At this time we are only able to accept referrals with [employer] insurance.                 Unfortunately, we are not able to accept this referral for the reason listed above. If further clarity is needed, our Transitional Care Specialists are available to discuss any barriers to service at [phone].      We look forward to collaborating with you in the future,  [facility]

## 2022-01-22 NOTE — CARE PLAN
The patient is Watcher - Medium risk of patient condition declining or worsening    Shift Goals  Clinical Goals: safety  Patient Goals: rest  Family Goals: Free from falls and injury    Progress made toward(s) clinical / shift goals:        Problem: Hemodynamics  Goal: Patient's hemodynamics, fluid balance and neurologic status will be stable or improve  Outcome: Progressing     Problem: Fluid Volume  Goal: Fluid volume balance will be maintained  Outcome: Progressing     Problem: Urinary - Renal Perfusion  Goal: Ability to achieve and maintain adequate renal perfusion and functioning will improve  Outcome: Progressing     Problem: Respiratory  Goal: Patient will achieve/maintain optimum respiratory ventilation and gas exchange  Outcome: Progressing

## 2022-01-22 NOTE — DISCHARGE PLANNING
Received Choice form at 1350  Agency/Facility Name: [facility]  [facility]   Referral sent per Choice form @ [phone]    RN CM notified

## 2022-01-22 NOTE — PROGRESS NOTES
Hospital Medicine Daily Progress Note    Date of Service  1/22/2022    Chief Complaint  Av Bob is a 76 y.o. male admitted 1/19/2022 with generalized weakness.     Hospital Course  76 y.o. male with a past medical history of diabetes, hypertension and chronic prostatitis with recurrent infections who presented 1/19/2022 with generalized weakness.  Patient reports that he has been having progressively worsening generalized weakness over the past 2 weeks.  He has been evaluated by his primary care yesterday who noticed evidence of acute kidney injury on his labs, accordingly was sent to the emergency room.  Patient has been having recurrent prostatitis and was multiple courses of Bactrim with urology evaluations.  Patient admitted started on IV antibiotics.  Urology was consulted recommended Michaels placement.  Patient also with acute kidney injury, nephrology consulted.     Interval Problem Update  Renal function continues to improve. PT/OT recommending home health. Vitals stable. Patient can dc once home health has been accepted. Discussed with patients wife at bedside all questions answered.    I have personally seen and examined the patient at bedside. I discussed the plan of care with patient, family, bedside RN, charge RN and .    Consultants/Specialty  nephrology and urology    Code Status  Full Code    Disposition  Patient is not medically cleared for discharge.   Anticipate discharge to to home with close outpatient follow-up.  I have placed the appropriate orders for post-discharge needs.    Review of Systems  Review of Systems   Constitutional: Positive for malaise/fatigue. Negative for chills and fever.   Respiratory: Negative for shortness of breath.    Cardiovascular: Negative for chest pain.   Gastrointestinal: Negative for abdominal pain, nausea and vomiting.   Genitourinary: Negative for dysuria.   Musculoskeletal: Negative for myalgias.   Skin: Negative for rash.    Neurological: Negative for dizziness and headaches.   Psychiatric/Behavioral: Negative for depression.   All other systems reviewed and are negative.       Physical Exam  Temp:  [36.3 °C (97.3 °F)-36.7 °C (98 °F)] 36.3 °C (97.4 °F)  Pulse:  [62-82] 67  Resp:  [16-18] 18  BP: (124-136)/(61-74) 126/74  SpO2:  [98 %-99 %] 98 %    Physical Exam  Vitals and nursing note reviewed.   Constitutional:       General: He is not in acute distress.     Appearance: Normal appearance. He is ill-appearing (chronically).   HENT:      Head: Normocephalic and atraumatic.   Eyes:      Conjunctiva/sclera: Conjunctivae normal.   Cardiovascular:      Rate and Rhythm: Normal rate and regular rhythm.      Pulses: Normal pulses.   Pulmonary:      Effort: Pulmonary effort is normal. No respiratory distress.      Breath sounds: Normal breath sounds. No wheezing.   Abdominal:      General: Abdomen is flat. There is no distension.      Palpations: Abdomen is soft.      Tenderness: There is no abdominal tenderness.   Musculoskeletal:         General: No swelling. Normal range of motion.      Comments: Left arm contracture   Skin:     General: Skin is warm.      Findings: No rash.   Neurological:      General: No focal deficit present.      Mental Status: He is alert and oriented to person, place, and time.      Cranial Nerves: No cranial nerve deficit.      Motor: Weakness (left arm, LEs) present.   Psychiatric:         Mood and Affect: Mood normal.         Behavior: Behavior normal.         Fluids    Intake/Output Summary (Last 24 hours) at 1/22/2022 1255  Last data filed at 1/22/2022 0500  Gross per 24 hour   Intake 480 ml   Output 1950 ml   Net -1470 ml       Laboratory  Recent Labs     01/20/22  0555 01/21/22  0402   WBC 9.0 10.3   RBC 4.13* 4.64*   HEMOGLOBIN 11.8* 13.3*   HEMATOCRIT 36.5* 40.7*   MCV 88.4 87.7   MCH 28.6 28.7   MCHC 32.3* 32.7*   RDW 48.7 47.3   PLATELETCT 304 367   MPV 9.7 9.7     Recent Labs     01/20/22  0555  01/21/22  0402 01/22/22  0517   SODIUM 137 138 135   POTASSIUM 4.6 4.1 4.7   CHLORIDE 110 107 107   CO2 18* 18* 18*   GLUCOSE 80 61* 99   BUN 61* 51* 45*   CREATININE 3.02* 2.39* 1.86*   CALCIUM 8.3* 9.0 8.4                   Imaging  CT-RENAL COLIC EVALUATION(A/P W/O)   Final Result      1.  Again seen severe bilateral hydronephrosis extending to the level of the urinary bladder. There is some dense material layering dependently within the renal calyces as well as within the distal left ureter and the urinary bladder which may represent    previously administered iodinated contrast agent.      2.  Bilateral diffuse renal cortical thinning.      3.  Asymmetric left urinary bladder wall thickening which is somewhat lobulated and could potentially represent neoplastic process.      4.  Extensive atherosclerotic vascular calcification.      5.  No evidence of bowel obstruction or focal inflammatory change.      DX-CHEST-PORTABLE (1 VIEW)   Final Result         1. No acute cardiopulmonary abnormalities are identified.           Assessment/Plan  Essential hypertension, benign- (present on admission)  Assessment & Plan  Resume home amlodipine, and metoprolol parameters  I will hold home losartan for now given his acute kidney injury    Acute cystitis without hematuria- (present on admission)  Assessment & Plan  Complicated urinary tract infection  Uro Dr Tanner, consulted, recommended yu & treating urinary tract infection with ceftriaxone    Urine cultures- proteus   Discussed with pharmacy, change to cefdinir     GERD with esophagitis- (present on admission)  Assessment & Plan  Resume home omeprazole    Type 2 diabetes mellitus, with long-term current use of insulin ([facility])- (present on admission)  Assessment & Plan  Without significant hyperglycemia  Last glycated hemoglobin was 7.1 %  Will resume home long acting insulin & start short acting insulin  Accu-Checks, hypoglycemia protocol     Stage 4 chronic kidney  disease (HCC)- (present on admission)  Assessment & Plan  With acute kidney injury.  Avoid nephrotoxins as much as possible, renally dose medications, monitor inputs and outputs        VTE prophylaxis: SCDs/TEDs    I have performed a physical exam and reviewed and updated ROS and Plan today (1/22/2022). In review of yesterday's note (1/21/2022), there are no changes except as documented above.

## 2022-01-22 NOTE — CARE PLAN
The patient is Stable - Low risk of patient condition declining or worsening    Shift Goals  Clinical Goals: Remain free of falls  Patient Goals: PT/OT eval    Progress made toward(s) clinical / shift goals:  Pt remained free of falls this shift. PT/OT planned for tomorrow.     Problem: Urinary - Renal Perfusion  Goal: Ability to achieve and maintain adequate renal perfusion and functioning will improve  Outcome: Progressing     Problem: Respiratory  Goal: Patient will achieve/maintain optimum respiratory ventilation and gas exchange  Outcome: Progressing     Problem: Skin Integrity  Goal: Skin integrity is maintained or improved  Outcome: Progressing     Problem: Fall Risk  Goal: Patient will remain free from falls  Outcome: Progressing     Problem: Pain - Standard  Goal: Alleviation of pain or a reduction in pain to the patient’s comfort goal  Outcome: Progressing       Patient is not progressing towards the following goals:

## 2022-01-22 NOTE — DISCHARGE PLANNING
Anticipated Discharge Disposition:  Home with Home Health     Action:   Chart review completed.  Per MD, patient will need Home Health confirmed prior to discharge.  Met with patient and wife at bedside.  Confirmed demos/[facility] are correct on the face sheet.  Choice letter signed (1. [facility]- declined, 2. [facility]-no response 3. [facility] contracted with insurance).  Home health sent to other options and [facility] [phone] called to confirm they are able to accept patient.  HH will call patient to coordinate start of care 24-48 hours.     Barriers to Discharge:   None identified.     Plan:   Hospital care management will continue to assist with discharge planning needs.

## 2022-01-23 NOTE — DISCHARGE INSTRUCTIONS
Discharge Instructions    Discharged to home by car with relative. Discharged via wheelchair, hospital escort: Yes.  Special equipment needed: Not Applicable    Be sure to schedule a follow-up appointment with your primary care doctor or any specialists as instructed.     Discharge Plan:   Influenza Vaccine Indication: Not indicated: Previously immunized this influenza season and > 8 years of age    I understand that a diet low in cholesterol, fat, and sodium is recommended for good health. Unless I have been given specific instructions below for another diet, I accept this instruction as my diet prescription.   Other diet: regular    Special Instructions: None    · Is patient discharged on Warfarin / Coumadin?   No     Depression / Suicide Risk    As you are discharged from this [facility] facility, it is important to learn how to keep safe from harming yourself.    Recognize the warning signs:  · Abrupt changes in personality, positive or negative- including increase in energy   · Giving away possessions  · Change in eating patterns- significant weight changes-  positive or negative  · Change in sleeping patterns- unable to sleep or sleeping all the time   · Unwillingness or inability to communicate  · Depression  · Unusual sadness, discouragement and loneliness  · Talk of wanting to die  · Neglect of personal appearance   · Rebelliousness- reckless behavior  · Withdrawal from people/activities they love  · Confusion- inability to concentrate     If you or a loved one observes any of these behaviors or has concerns about self-harm, here's what you can do:  · Talk about it- your feelings and reasons for harming yourself  · Remove any means that you might use to hurt yourself (examples: pills, rope, extension cords, firearm)  · Get professional help from the community (Mental Health, Substance Abuse, psychological counseling)  · Do not be alone:Call your Safe Contact- someone whom you trust who will be there for  you.  · Call your local CRISIS HOTLINE 538-1155 or 086-979-5289  · Call your local Children's Mobile Crisis Response Team [place] (903) 372-5934 or www."[employer]"  · Call the toll free National Suicide Prevention Hotlines   · National Suicide Prevention Lifeline 474-469-MQZO (0281)  · National [facility] health Line Network 800-SUICIDE (957-6583)

## 2022-01-23 NOTE — THERAPY
Occupational Therapy   Initial Evaluation     Patient Name: Av Bob  Age:  76 y.o., Sex:  male  Medical Record #: [id number]  Today's Date: 1/22/2022     Precautions: Fall Risk    Assessment  Patient is 76 y.o. male admitted with weakness. UTI. Michaels in place. A&Ox2-3. Wife in room, states she is caregiver. Pt spends most of time in w/c or bed. Spouse assists with transfers and ADL's. CVA ~5 yrs ago; L side hemiparesis. Pt able to perform sup><sit with MaxA. STS with ModA, quad cane. Pt shows impaired balance,strength,coordination. Pt with limited PLOF; wife states she is able to care for him and owns all approp DME. OT will follow while in house. Wife states she will have increased help from family upon dc.           Plan  Recommend Occupational Therapy 3 times per week until therapy goals are met for the following treatments:  Neuro Re-Education / Balance and Therapeutic Activities.  DC Equipment Recommendations: None  Discharge Recommendations: Recommend home health for continued occupational therapy services      01/22/22 1219   Prior Living Situation   Prior Services Intermittent Physical Support for ADL Per Family   Housing / Facility [address]   Steps Into Home 0   Steps In Home 0   Bathroom Set up Walk In Shower;Shower Chair;Grab Bars   Equipment Owned Wheelchair;Quad Cane;Tub / Shower Seat;Hand Held Shower;Grab Bar(s) In Tub / Shower;Bed Side Commode  (transfer pole by bed )   Lives with - Patient's Self Care Capacity Spouse   Comments pt's wife is caregiver- assists with transfers, sponge baths, Depends (uses bsc not toilet), dressing. Therapy tech from [employer] visits 2x/wk.       Prior Level of ADL Function   Self Feeding Independent   Grooming / Hygiene Requires Assist   Bathing Requires Assist   Dressing Requires Assist   Toileting Requires Assist   Prior Level of IADL Function   Medication Management Requires Assist   Laundry Requires Assist   Kitchen Mobility Requires Assist    Finances Requires Assist   Home Management Requires Assist   Shopping Requires Assist   Prior Level Of Mobility Uses Wheel Chair for in Home Mobility   Driving / Transportation Relatives / Others Provide Transportation   Occupation (Pre-Hospital Vocational) Not Employed   Leisure Interests Unable To Determine At This Time   History of Falls   History of Falls Yes   Precautions   Precautions Fall Risk   Vitals   O2 Delivery Device None - Room Air   Pain 0 - 10 Group   Therapist Pain Assessment 0   Cognition    Cognition / Consciousness   (Ox2-3)   Level of Consciousness Alert   Comments mild confusion   Passive ROM Upper Body   Passive ROM Upper Body WDL   Comments except L ellbow. Wife reports performing ROM to LUE/LE at home    Active ROM Upper Body   Active ROM Upper Body  X   Dominant Hand Right   Comments L elbow contracture. L hemiparesis   Strength Upper Body   Upper Body Strength  X  (LUE)   Comments RUE- WFL   Sensation Upper Body   Upper Extremity Sensation  WDL   Upper Body Muscle Tone   Upper Body Muscle Tone  X   Comments hypertonicity LUE   Neurological Concerns   Neurological Concerns   (cva 5 yrs ago)   Coordination Upper Body   Coordination X   Balance Assessment   Sitting Balance (Static) Fair   Sitting Balance (Dynamic) Fair -   Standing Balance (Static) Poor   Standing Balance (Dynamic) Trace +   Weight Shift Sitting Poor   Weight Shift Standing Absent   Bed Mobility    Supine to Sit Maximal Assist   Sit to Supine Maximal Assist   Scooting Maximal Assist   ADL Assessment   Eating Minimal Assist   Grooming Minimal Assist;Seated   Upper Body Dressing Maximal Assist   Toileting Total Assist   How much help from another person does the patient currently need...   Putting on and taking off regular lower body clothing? 1   Bathing (including washing, rinsing, and drying)? 1   Toileting, which includes using a toilet, bedpan, or urinal? 1   Putting on and taking off regular upper body clothing? 2    Taking care of personal grooming such as brushing teeth? 3   Eating meals? 3   6 Clicks Daily Activity Score 11   Functional Mobility   Sit to Stand Moderate Assist   Bed, Chair, Wheelchair Transfer Moderate Assist   Visual Perception   Visual Perception  Not Tested   Activity Tolerance   Sitting Edge of Bed 10   Standing 3-4   Short Term Goals   Short Term Goal # 1 pt will perform ADL transfer with Jung within 5 days   Short Term Goal # 2 pt will feed with Sup within 5 days

## 2022-01-24 ENCOUNTER — PATIENT OUTREACH (OUTPATIENT)
Dept: MEDICAL GROUP | Facility: PHYSICIAN GROUP | Age: 77
End: 2022-01-24

## 2022-01-25 ENCOUNTER — TELEPHONE (OUTPATIENT)
Dept: MEDICAL GROUP | Facility: PHYSICIAN GROUP | Age: 77
End: 2022-01-25

## 2022-01-25 NOTE — TELEPHONE ENCOUNTER
----- Message from Madison Bunch D.O. sent at 1/25/2022  9:05 AM PST -----  Regarding: FW: Av’s status      ----- Message -----  From: Cadence Lei, Colby Ass't  Sent: 1/24/2022   1:17 PM PST  To: Madison Bunch D.O.  Subject: FW: Av’s status                                ----- Message -----  From: Av Allen Lisbeth  Sent: 1/24/2022  12:34 PM PST  To: [facility]  Subject: Av’s status                                  Hi Dr. Bunch,  Av was discharged from [facility] Saturday evening. Home Health is supposed to come tomorrow. He’s doing better but we are dealing with severe diarrhea from the antibiotics. His butt is raw to the point of bleeding when I clean him up. I have dealt with this before and I know that the area won’t heal until the antibiotics are gone(2 1/2 more days) and his bowels go back to normal. In the meantime he is miserable and cries and screams when I need to clean him up. I am giving him Tylenol and have him on the BRAT diet. I also have zinc paste from the hospital.  Not fun.On a good note, his urine is starting to look like urine agin and not just blood. He is scheduled with Dr. Barry for a cystoscopy and ultrasound on Feb 10. Just wanted to give you an update. The hospital said he needs an appointment with you in 1-2 weeks. Should I call Cadence for that?  Hope all is well with you and your family.  Usha

## 2022-01-25 NOTE — PROGRESS NOTES
RN Transitional Care Management discharge follow up call completed. Patient has been having frequent diarrhea since discharge, they think it is related to antibiotic use. He is taking a probiotic regularly. Patient not able to get to toilet at this time, they are using disposable pads and cloths/water and zinc paste. Recommended leaving open to air and off loading as much as possible/tolerated to allow for skin to heal. Patient does not have discharge follow up appointment scheduled with PCP at this time, patient unable to get to office appointment at this time, patient's wife to follow up with office to schedule. They are waiting to hear from [facility] to schedule HH intake. Please route chart back to RN if additional follow up is needed/requested.     Thank you!    LETICIA Almeida Care Coordinator  Community Care Management [phone]  Chronic Care Management [phone]

## 2022-01-31 ENCOUNTER — TELEPHONE (OUTPATIENT)
Dept: MEDICAL GROUP | Facility: PHYSICIAN GROUP | Age: 77
End: 2022-01-31

## 2022-01-31 DIAGNOSIS — R19.7 DIARRHEA OF PRESUMED INFECTIOUS ORIGIN: ICD-10-CM

## 2022-02-01 NOTE — TELEPHONE ENCOUNTER
Called and spoke with Usha, pt had 2 doses of Imodium 2 days, then another dose yesterday. Today's BM was normal and buttocks are starting to improve. Updated DO Julio C.

## 2022-02-15 ENCOUNTER — HOSPITAL ENCOUNTER (OUTPATIENT)
Facility: MEDICAL CENTER | Age: 77
End: 2022-02-15
Attending: INTERNAL MEDICINE
Payer: MEDICARE

## 2022-02-15 ENCOUNTER — OFFICE VISIT (OUTPATIENT)
Dept: MEDICAL GROUP | Facility: PHYSICIAN GROUP | Age: 77
End: 2022-02-15
Payer: MEDICARE

## 2022-02-15 VITALS
TEMPERATURE: 96.1 F | HEART RATE: 81 BPM | OXYGEN SATURATION: 97 % | HEIGHT: 70 IN | SYSTOLIC BLOOD PRESSURE: 110 MMHG | BODY MASS INDEX: 28.37 KG/M2 | DIASTOLIC BLOOD PRESSURE: 60 MMHG | RESPIRATION RATE: 16 BRPM | WEIGHT: 198.2 LBS

## 2022-02-15 DIAGNOSIS — N18.4 STAGE 4 CHRONIC KIDNEY DISEASE (HCC): ICD-10-CM

## 2022-02-15 DIAGNOSIS — Z99.3 WHEELCHAIR DEPENDENCE: ICD-10-CM

## 2022-02-15 DIAGNOSIS — E11.69 TYPE 2 DIABETES MELLITUS WITH OTHER SPECIFIED COMPLICATION, WITH LONG-TERM CURRENT USE OF INSULIN (HCC): ICD-10-CM

## 2022-02-15 DIAGNOSIS — L89.301 PRESSURE INJURY OF BUTTOCK, STAGE 1, UNSPECIFIED LATERALITY: ICD-10-CM

## 2022-02-15 DIAGNOSIS — N18.32 STAGE 3B CHRONIC KIDNEY DISEASE: ICD-10-CM

## 2022-02-15 DIAGNOSIS — Z79.4 TYPE 2 DIABETES MELLITUS WITH OTHER SPECIFIED COMPLICATION, WITH LONG-TERM CURRENT USE OF INSULIN (HCC): ICD-10-CM

## 2022-02-15 PROCEDURE — 99000 SPECIMEN HANDLING OFFICE-LAB: CPT | Performed by: INTERNAL MEDICINE

## 2022-02-15 PROCEDURE — 85025 COMPLETE CBC W/AUTO DIFF WBC: CPT

## 2022-02-15 PROCEDURE — 99214 OFFICE O/P EST MOD 30 MIN: CPT | Mod: 25 | Performed by: INTERNAL MEDICINE

## 2022-02-15 PROCEDURE — 80048 BASIC METABOLIC PNL TOTAL CA: CPT

## 2022-02-15 PROCEDURE — 36415 COLL VENOUS BLD VENIPUNCTURE: CPT | Performed by: INTERNAL MEDICINE

## 2022-02-15 RX ORDER — INSULIN GLARGINE 300 U/ML
18 INJECTION, SOLUTION SUBCUTANEOUS DAILY
Qty: 1.5 ML | Refills: 3 | Status: SHIPPED | OUTPATIENT
Start: 2022-02-15 | End: 2022-02-28 | Stop reason: SDUPTHER

## 2022-02-15 RX ORDER — CEFDINIR 300 MG/1
CAPSULE ORAL
COMMUNITY
End: 2022-05-04

## 2022-02-15 ASSESSMENT — FIBROSIS 4 INDEX: FIB4 SCORE: 1.32

## 2022-02-15 NOTE — ASSESSMENT & PLAN NOTE
Chronic and ongoing problem, would now benefit from Roho cushion to help protect skin integrity due to recent decline with perineal wounds and skin breakdown related to diarrhea and frequent sitting/wheelchair use. This was recommended by home health nursing and PT. Will start by contacting [facility] to see if they carry a Roho cushion and if not then can try other DME carriers.

## 2022-02-15 NOTE — ASSESSMENT & PLAN NOTE
Chronic and ongoing problem, decompensated last month requiring hospitalization with yu catheter placement. Now with I&O cath three times daily. Update GFR, will continue reduce dosed Toujeo at 18U due to AM hypoglycemia and will continue to adjust regimen based on GFR. They will follow up with Dr. Jarvis of nephrology tomorrow. They are tentatively planned for TURP but do not yet have a date due to back up with scheduling.

## 2022-02-15 NOTE — PROGRESS NOTES
Subjective:   Chief Complaint/History of Present Illness:  Av Bob is a 76 y.o. male established patient who presents today to discuss medical problems as listed below. Av is accompanied by his wife, Usha.    Problem   Pressure Injury of Buttock, Stage 1    He developed pressure injury related to significant diarrhea as well as the amount of time he spends in his wheelchair. He was evaluated by healthy living at [facility] which is a home health care company out of [facility]. The nurses who provided wound care as well as a physical therapist recommend he received a Roho cushion for his wheelchair to help prevent future wounds.     Wheelchair dependent    He has been using a wheelchair exclusively for mobility. He had wheelchair fitting completed in 9/2020 and was provided a wheelchair through [employer].  He has a history of left hemiplegia after a stroke and this worsened after he was critically ill (NHL on chemo, norovirus in ICU and critically ill). Over the past few months he has had complications with BPH, urine retention, UTI's needing antibiotics, and subsequent diarrhea with pain and skin breakdown in the perineum. Home health PT and nursing recommend he have a Roho cushion to help protect skin integrity due to recent complications.     Type 2 Diabetes Mellitus, With Long-Term Current Use of Insulin ([facility])       Ref. Range 1/13/2022 10:43   Glycohemoglobin Latest Ref Range: 0.0 - 5.6 % 7.1 (A)      Ref. Range 10/6/2021 11:38   Micro Alb Creat Ratio Latest Ref Range: 0 - 30 mg/g 42 (H)     Longstanding history of type 2 diabetes on insulin.  Previously followed with endocrinology, Dr. Rasheed.     Current regimen includes Trulicity 3 mg weekly,Toujeo 18 units daily, Humalog 5 units for sugars between 101-150, increase by 2 units if greater than 150.  Correction dosage is 2: 50 greater than 150.    Complicated by retinal involvement, neuropathy, CKDIII-IV, and microalbuminuria.       Stage  3b Chronic Kidney Disease (Hcc)    He has a history of chronic kidney disease related to nephrolithiasis, recurrent UTIs, and BPH as well as history of hypertension and diabetes.  He is following with nephrology, Dr. Jarvis and urology, Dr. Barry.    Spironolactone was discontinued due to worsening chronic kidney disease.    Other current renally excreted medications include losartan 50 mg daily, potassium chloride 8 mEq daily, and Toujeo/Humalog.    Recent declining kidney function related to prostatitis and urinary retention, slowly improving.          Current Medications:  Current Outpatient Medications Ordered in Epic   Medication Sig Dispense Refill   • cefdinir (OMNICEF) 300 MG Cap cefdinir 300 mg capsule   TAKE 1 CAPSULE BY MOUTH EVERY 12 HOURS FOR 4 DAYS     • Insulin Glargine, 1 Unit Dial, (TOUJEO SOLOSTAR) 300 UNIT/ML Solution Pen-injector Inject 18 Units under the skin every day. Per wife Sliding Scale, per wife gave 22 units on 1/18/2022 1.5 mL 3   • acetaminophen (TYLENOL) 500 MG Tab Take 1,000 mg by mouth every 6 hours as needed for Moderate Pain.     • Cholecalciferol (D3) 2000 UNIT Tab Take 2,000 Units by mouth every day.     • Dulaglutide (TRULICITY) 3 MG/0.5ML Solution Pen-injector Inject 0.5 mL under the skin every Friday. (Patient taking differently: Inject 0.5 mL under the skin every Saturday.) 2 mL 3   • insulin lispro (HUMALOG,ADMELOG) 100 UNIT/ML Solution Pen-injector injection PEN Inject 5-9 Units under the skin 2 times daily, before breakfast and dinner. Start at 5 units then 2 units for every 50 points over 150     • potassium chloride (KLOR-CON) 8 MEQ tablet Take 1 Tablet by mouth every day. 90 Tablet 3   • metoprolol SR (TOPROL XL) 100 MG TABLET SR 24 HR Take 1 Tablet by mouth every day. (Patient taking differently: Take 100 mg by mouth at bedtime.) 90 Tablet 3   • losartan (COZAAR) 50 MG Tab Take 1 Tablet by mouth 2 times a day. 180 Tablet 3   • fenofibrate (TRICOR) 145 MG Tab Take 1  "Tablet by mouth every day. (Patient taking differently: Take 145 mg by mouth every evening.) 90 Tablet 3   • clopidogrel (PLAVIX) 75 MG Tab Take 1 Tablet by mouth every day. (Patient taking differently: Take 75 mg by mouth every evening.) 90 Tablet 3   • amLODIPine (NORVASC) 5 MG Tab Take 1 Tablet by mouth every day. 90 Tablet 3   • atorvastatin (LIPITOR) 80 MG tablet Take 1 Tablet by mouth every evening. 90 Tablet 3   • omeprazole (PRILOSEC) 20 MG delayed-release capsule Take 20 mg by mouth every day.     • gabapentin (NEURONTIN) 100 MG Cap TAKE 1 TO 3 CAPSULES BY MOUTH AT BEDTIME AS NEEDED FOR PAIN (Patient taking differently: Take 200 mg by mouth at bedtime.) 90 capsule 3   • fluoxetine (PROZAC) 40 MG capsule TAKE 1 CAPSULE BY MOUTH EVERY DAY (Patient taking differently: Take 40 mg by mouth every day.) 90 capsule 3   • allopurinol (ZYLOPRIM) 100 MG Tab TAKE 1 TABLET BY MOUTH EVERY DAY (Patient taking differently: Take 100 mg by mouth every evening.) 100 tablet 3   • Multiple Vitamin (MULTI VITAMIN MENS PO) Take 1 Tablet by mouth every day.     • aspirin EC (ECOTRIN) 81 MG Tablet Delayed Response Take 81 mg by mouth every day.     • Probiotic Product (PROBIOTIC DAILY PO) Take 1 Cap by mouth 2 Times a Day.     • magnesium oxide (MAG-OX) 400 MG Tab Take 800 mg by mouth 2 times a day.     • finasteride (PROSCAR) 5 MG Tab Take 1 Tab by mouth every day. (Patient taking differently: Take 5 mg by mouth every evening.) 30 Tab 0   • alfuzosin (UROXATRAL) 10 MG SR tablet Take 10 mg by mouth every day.     • methenamine hip (HIPPREX) 1 GM Tab Take 1 g by mouth 2 times a day.       No current [facility]-ordered facility-administered medications on file.          Objective:   Physical Exam:    Vitals: /60 (BP Location: Right arm, Patient Position: Sitting, BP Cuff Size: Adult)   Pulse 81   Temp (!) 35.6 °C (96.1 °F) (Temporal)   Resp 16   Ht 1.778 m (5' 10\")   Wt 89.9 kg (198 lb 3.2 oz)   SpO2 97%    BMI: Body mass index " is 28.44 kg/m².  Physical Exam  Constitutional:       General: He is not in acute distress.     Appearance: He is not toxic-appearing.      Comments: Chronically ill appearing but much improved compared to last check.   HENT:      Right Ear: There is no impacted cerumen.      Left Ear: There is no impacted cerumen.   Eyes:      General: No scleral icterus.     Conjunctiva/sclera: Conjunctivae normal.   Cardiovascular:      Rate and Rhythm: Normal rate and regular rhythm.   Pulmonary:      Effort: Pulmonary effort is normal. No respiratory distress.      Breath sounds: Normal breath sounds. No wheezing or rhonchi.   Musculoskeletal:      Right lower leg: No edema.      Left lower leg: No edema.   Skin:     General: Skin is warm and dry.   Neurological:      Gait: Gait abnormal.      Comments: Uses wheelchair chronically for left hemiplegia.   Psychiatric:         Mood and Affect: Mood normal.         Behavior: Behavior normal.         Thought Content: Thought content normal.         Judgment: Judgment normal.          Assessment and Plan:   Av is a 76 y.o. male with the following:  Problem List Items Addressed This Visit     Stage 3b chronic kidney disease (HCC)     Chronic and ongoing problem, decompensated last month requiring hospitalization with yu catheter placement. Now with I&O cath three times daily. Update GFR, will continue reduce dosed Toujeo at 18U due to AM hypoglycemia and will continue to adjust regimen based on GFR. They will follow up with Dr. Jarvis of nephrology tomorrow. They are tentatively planned for TURP but do not yet have a date due to back up with scheduling.         Type 2 diabetes mellitus, with long-term current use of insulin (HCC)     Chronic and ongoing problem, he has had recent episodes of hypoglycemia in the morning which improved with reducing Toujeo slowly from 28 U to 18 U. Suspect insulin need has declined due to reduced GFR. Update renal function and asked them to send me  fasting blood sugar readings. Continue Trulicity and mealtime insulin, daytime glucose readings are at goal. Continue checking 3-4 times daily. Follow up with nephrology tomorrow and ophthalmology in 1 weeks.         Relevant Medications    Insulin Glargine, 1 Unit Dial, (TOUJEO SOLOSTAR) 300 UNIT/ML Solution Pen-injector    Wheelchair dependent     Chronic and ongoing problem, would now benefit from Roho cushion to help protect skin integrity due to recent decline with perineal wounds and skin breakdown related to diarrhea and frequent sitting/wheelchair use. This was recommended by home health nursing and PT. Will start by contacting [facility] to see if they carry a Roho cushion and if not then can try other DME carriers.         Pressure injury of buttock, stage 1     New and decompensated problem. He developed pressure injury on the buttocks related to significant diarrhea as well as the amount of time he spends in his wheelchair. He was evaluated by healthy living at [facility] which is a home health care company out of [facility]. The nurses who provided wound care as well as a physical therapist recommend he received a Roho cushion for his wheelchair to help prevent future wounds and to offload this pressure point. He received his wheelchair from [employer] and would like to get the cushion from them as well. We will fax over the appropriate paperwork to their office.                RTC: Return in about 3 months (around 5/15/2022).    I spent a total of 36 minutes with record review, exam, communication with the patient, communication with other providers, and documentation of this encounter.    PLEASE NOTE: This dictation was created using voice recognition software. I have made every reasonable attempt to correct obvious errors, but I expect that there are errors of grammar and possibly content that I did not discover before finalizing the note.      Madison Bunch, DO  Geriatric and Internal  Medicine  [facility]

## 2022-02-15 NOTE — ASSESSMENT & PLAN NOTE
Chronic and ongoing problem, he has had recent episodes of hypoglycemia in the morning which improved with reducing Toujeo slowly from 28 U to 18 U. Suspect insulin need has declined due to reduced GFR. Update renal function and asked them to send me fasting blood sugar readings. Continue Trulicity and mealtime insulin, daytime glucose readings are at goal. Continue checking 3-4 times daily. Follow up with nephrology tomorrow and ophthalmology in 1 weeks.

## 2022-02-16 ENCOUNTER — OFFICE VISIT (OUTPATIENT)
Dept: NEPHROLOGY | Facility: MEDICAL CENTER | Age: 77
End: 2022-02-16
Payer: MEDICARE

## 2022-02-16 VITALS
TEMPERATURE: 96.9 F | SYSTOLIC BLOOD PRESSURE: 132 MMHG | OXYGEN SATURATION: 99 % | HEART RATE: 71 BPM | WEIGHT: 198 LBS | HEIGHT: 72 IN | DIASTOLIC BLOOD PRESSURE: 78 MMHG | BODY MASS INDEX: 26.82 KG/M2

## 2022-02-16 DIAGNOSIS — D64.9 ANEMIA, UNSPECIFIED TYPE: ICD-10-CM

## 2022-02-16 DIAGNOSIS — R80.9 MICROALBUMINURIA DUE TO TYPE 2 DIABETES MELLITUS (HCC): ICD-10-CM

## 2022-02-16 DIAGNOSIS — E55.9 VITAMIN D DEFICIENCY: ICD-10-CM

## 2022-02-16 DIAGNOSIS — N18.32 STAGE 3B CHRONIC KIDNEY DISEASE: ICD-10-CM

## 2022-02-16 DIAGNOSIS — R33.9 URINARY RETENTION: ICD-10-CM

## 2022-02-16 DIAGNOSIS — I10 ESSENTIAL HYPERTENSION: ICD-10-CM

## 2022-02-16 DIAGNOSIS — E11.29 MICROALBUMINURIA DUE TO TYPE 2 DIABETES MELLITUS (HCC): ICD-10-CM

## 2022-02-16 LAB
ANION GAP SERPL CALC-SCNC: 13 MMOL/L (ref 7–16)
BASOPHILS # BLD AUTO: 0.5 % (ref 0–1.8)
BASOPHILS # BLD: 0.05 K/UL (ref 0–0.12)
BUN SERPL-MCNC: 28 MG/DL (ref 8–22)
CALCIUM SERPL-MCNC: 8.8 MG/DL (ref 8.5–10.5)
CHLORIDE SERPL-SCNC: 106 MMOL/L (ref 96–112)
CO2 SERPL-SCNC: 20 MMOL/L (ref 20–33)
CREAT SERPL-MCNC: 1.66 MG/DL (ref 0.5–1.4)
EOSINOPHIL # BLD AUTO: 0.31 K/UL (ref 0–0.51)
EOSINOPHIL NFR BLD: 3.4 % (ref 0–6.9)
ERYTHROCYTE [DISTWIDTH] IN BLOOD BY AUTOMATED COUNT: 52.6 FL (ref 35.9–50)
GLUCOSE SERPL-MCNC: 150 MG/DL (ref 65–99)
HCT VFR BLD AUTO: 39.3 % (ref 42–52)
HGB BLD-MCNC: 12.8 G/DL (ref 14–18)
IMM GRANULOCYTES # BLD AUTO: 0.05 K/UL (ref 0–0.11)
IMM GRANULOCYTES NFR BLD AUTO: 0.5 % (ref 0–0.9)
LYMPHOCYTES # BLD AUTO: 0.88 K/UL (ref 1–4.8)
LYMPHOCYTES NFR BLD: 9.5 % (ref 22–41)
MCH RBC QN AUTO: 29.9 PG (ref 27–33)
MCHC RBC AUTO-ENTMCNC: 32.6 G/DL (ref 33.7–35.3)
MCV RBC AUTO: 91.8 FL (ref 81.4–97.8)
MONOCYTES # BLD AUTO: 0.45 K/UL (ref 0–0.85)
MONOCYTES NFR BLD AUTO: 4.9 % (ref 0–13.4)
NEUTROPHILS # BLD AUTO: 7.48 K/UL (ref 1.82–7.42)
NEUTROPHILS NFR BLD: 81.2 % (ref 44–72)
NRBC # BLD AUTO: 0 K/UL
NRBC BLD-RTO: 0 /100 WBC
PLATELET # BLD AUTO: 302 K/UL (ref 164–446)
PMV BLD AUTO: 10.7 FL (ref 9–12.9)
POTASSIUM SERPL-SCNC: 4.2 MMOL/L (ref 3.6–5.5)
RBC # BLD AUTO: 4.28 M/UL (ref 4.7–6.1)
SODIUM SERPL-SCNC: 139 MMOL/L (ref 135–145)
WBC # BLD AUTO: 9.2 K/UL (ref 4.8–10.8)

## 2022-02-16 PROCEDURE — 99214 OFFICE O/P EST MOD 30 MIN: CPT | Performed by: INTERNAL MEDICINE

## 2022-02-16 ASSESSMENT — ENCOUNTER SYMPTOMS
FLANK PAIN: 0
TINGLING: 0
BACK PAIN: 1
PALPITATIONS: 0
WEIGHT LOSS: 0
HEADACHES: 0
DIZZINESS: 0
ORTHOPNEA: 0
WHEEZING: 0
FEVER: 0
ABDOMINAL PAIN: 0
MYALGIAS: 0
NECK PAIN: 0
CHILLS: 0
COUGH: 0
NAUSEA: 0
EYES NEGATIVE: 1
HEMOPTYSIS: 0
SHORTNESS OF BREATH: 0
SINUS PAIN: 0
FOCAL WEAKNESS: 1
VOMITING: 0

## 2022-02-16 ASSESSMENT — FIBROSIS 4 INDEX: FIB4 SCORE: 1.6

## 2022-02-17 ENCOUNTER — TELEPHONE (OUTPATIENT)
Dept: MEDICAL GROUP | Facility: PHYSICIAN GROUP | Age: 77
End: 2022-02-17
Payer: MEDICARE

## 2022-02-17 PROBLEM — L89.301 PRESSURE INJURY OF BUTTOCK, STAGE 1: Status: ACTIVE | Noted: 2022-02-17

## 2022-02-17 NOTE — TELEPHONE ENCOUNTER
1. Caller Name: Hong    [facility] [employer]                     Call Back Number: [phone]      How would the patient prefer to be contacted with a response: Phone call OK to leave a detailed message    Faxed RX and chart notes for order for ROHO cushion

## 2022-02-17 NOTE — PROGRESS NOTES
Subjective:      Av Bob is a 76 y.o. male who presents with Chronic Kidney Disease and Follow-Up            Chronic Kidney Disease  Pertinent negatives include no abdominal pain, chest pain, chills, congestion, coughing, fever, headaches, myalgias, nausea, neck pain or vomiting.     Av is coming today for post hospitalization f/u of AKICKD III, urinary retention, hydronephrosis, recurrent UTI,   Recently hospitalized with CATA, hydronephrosis, urinary retention, UTI  CATA -creat level improving -to monitor -down to 1.6 -baseline at 1,4  Michaels catheter pulled -self catheterization q 8H  Urology following  HTN: BP well controlled  Vit D and PTH well controlled -to monitor  Anemia: Hb level - stable    Review of Systems   Constitutional: Negative for chills, fever, malaise/fatigue and weight loss.   HENT: Negative for congestion, hearing loss and sinus pain.    Eyes: Negative.    Respiratory: Negative for cough, hemoptysis, shortness of breath and wheezing.    Cardiovascular: Negative for chest pain, palpitations, orthopnea and leg swelling.   Gastrointestinal: Negative for abdominal pain, nausea and vomiting.   Genitourinary: Negative for flank pain and hematuria.   Musculoskeletal: Positive for back pain. Negative for myalgias and neck pain.   Skin: Negative.    Neurological: Positive for focal weakness. Negative for dizziness, tingling and headaches.   All other systems reviewed and are negative.    Past Medical History:   Diagnosis Date   • ([facility]) Chronic ulcer of right lower extremity with fat layer exposed 12/27/2018   • Acute on chronic renal failure ([facility]) 1/21/2020   • Acute respiratory failure with hypoxia ([facility]) 5/20/2017   • CAD (coronary artery disease)     GIOVANNA to RCA in '97, GIOVANNA X2 to LAD and GIOVANNA X2 to OM in '09   • Cancer ([facility])     2017; chemo lympoma non hodgkins lymphoma   • Cataract     dez iol   • Cerebrovascular accident (CVA) ([facility]) 12/30/2016    Left arm weakness  etiology of stroke  not established, lymphoma discovered on MRI evaluation of stroke, L hemiparesis much worse after acute infectious illness in mid 2017, but no specific diagnosed recurrent neurological etiology, all at [facility]   • Chickenpox    • CKD (chronic kidney disease) stage 3, GFR 30-59 ml/min ([facility])    • Controlled gout 2014   • Coronary atherosclerosis of native coronary artery     S/P PTCA (percutaneous transluminal coronary angioplasty), RCA, 5/1997, patent on cath 7/10/2009 at the time of interventions on his left anterior descending and circumflex coronary arteries   • Daytime sleepiness    • Depression    • Diabetes ([facility])    • Difficulty swallowing    • Enterococcal septicemia ([facility]) 8/12/2017   • Roberto hematuria 1/29/2020   • Frequent urination    • GERD (gastroesophageal reflux disease)    • Romansh measles    • Hypertension 06/30/2021    pt states well controlled on meds   • Hypokalemia 2012    controlled with combination of ACE inhibitor or ARB plus spironolactone   • Hypomagnesemia 08/12/2017    etiology uncertain   • Influenza A 1/26/2020   • Insomnia    • Kidney stone    • Leg edema, right 1/22/2020   • 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 Barry   • Normocytic hypochromic anemia 5/20/2017   • NSTEMI (non-ST elevated myocardial infarction) ([facility]) 07/18/2017    complicating UTI with sepsis   • Pain 06/30/2021    right leg foot   • Peripheral vascular disease ([facility])    • Polyneuropathy in diabetes(357.2) 9/11/2013   • Renal mass 1/21/2020   • Septic shock ([facility]) 5/20/2017   • Skin ulcer of calf ([facility]) 2015    Right, Dr. Terry and wound care   • Stroke ([facility]) 2016    left sided weakness   • Urinary bladder disorder    • Urinary incontinence     pt wears pads   • Weakness    • Wound of left leg 2012    Requiring surgery and debridment, Dr. Moore       Family History   Problem Relation Age  of Onset   • Heart Disease Father         CAD   • Diabetes Father    • Cancer Mother    • Psychiatric Illness Mother         Depression   • Depression Mother    • Kidney stones Brother    • Heart Disease Brother    • Psychiatric Illness Brother         Depression   • Depression Brother    • Suicide Attempts Other    • Psychiatric Illness Other         autism       Social History     Socioeconomic History   • Marital status:    Tobacco Use   • Smoking status: Never Smoker   • Smokeless tobacco: Never Used   Vaping Use   • Vaping Use: Never used   Substance and Sexual Activity   • Alcohol use: Never   • Drug use: Never   • Sexual activity: Not Currently     Partners: Female     Comment: , one daughter, 2 grands   Other Topics Concern   •  Service Yes   • Blood Transfusions No   • Caffeine Concern No   • Occupational Exposure No   • Hobby Hazards No   • Sleep Concern Yes   • Stress Concern No   • Weight Concern No   • Special Diet No   • Back Care No   • Exercise Yes   • Bike Helmet No   • Seat Belt Yes   • Self-Exams Yes   Social History Narrative    Av is from [place], [place] and raised in [place]. He has been in [place] since 2004. 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. He retired in his early 60's.  He has been  to Usha since 2003, they met through a mutual friend. He has a daughter (Kalina) and a step son (Jimi).          Objective:     /78 (BP Location: Right arm, Patient Position: Sitting, BP Cuff Size: Adult)   Pulse 71   Temp 36.1 °C (96.9 °F) (Temporal)   Ht 1.829 m (6')   Wt 89.8 kg (198 lb)   SpO2 99%   BMI 26.85 kg/m²          Physical Exam  Vitals reviewed.   Constitutional:       General: He is not in acute distress.     Appearance: Normal appearance. He is  well-developed. He is not diaphoretic.   HENT:      Head: Normocephalic and atraumatic.      Nose: Nose normal.      Mouth/Throat:      Mouth: Mucous membranes are moist.      Pharynx: Oropharynx is clear.   Eyes:      Extraocular Movements: Extraocular movements intact.      Conjunctiva/sclera: Conjunctivae normal.      Pupils: Pupils are equal, round, and reactive to light.   Cardiovascular:      Rate and Rhythm: Normal rate and regular rhythm.      Pulses: Normal pulses.      Heart sounds: Normal heart sounds.     No friction rub. No gallop.   Pulmonary:      Effort: Pulmonary effort is normal. No respiratory distress.      Breath sounds: Normal breath sounds. No wheezing, rhonchi or rales.   Abdominal:      General: Bowel sounds are normal. There is no distension.      Palpations: Abdomen is soft. There is no mass.      Tenderness: There is no abdominal tenderness. There is no right CVA tenderness.   Musculoskeletal:      Cervical back: Normal range of motion and neck supple.      Right lower leg: No edema.      Left lower leg: No edema.   Skin:     General: Skin is warm and dry.      Coloration: Skin is not jaundiced or pale.      Findings: No bruising, erythema, lesion or rash.   Neurological:      Mental Status: He is alert and oriented to person, place, and time.      Cranial Nerves: No cranial nerve deficit.      Coordination: Coordination normal.      Comments: Left sided weakness   Psychiatric:         Mood and Affect: Mood normal.         Behavior: Behavior normal.         Thought Content: Thought content normal.         Judgment: Judgment normal.            Laboratory results reviewed: d/w pt    Lab Results   Component Value Date/Time    CREATININE 1.66 (H) 02/15/2022 12:15 PM    CREATININE 1.4 05/28/2008 05:42 PM    POTASSIUM 4.2 02/15/2022 12:15 PM     Assessment and Plan    1.CATA/CKD IIIa-creat level improving -to monitor  2.HTN: BP well controlled -to monitor  3.Electrolytes: well  controlled.  4.Anemia: Hb level stable  5.Urinary retention/hydronephrosis -f/u with Urology  6.Volume:well controlled  7.Microalbuminuria due to DM II - well controlled to monitor -on ARB  8.Vit D def: vit D and PTH well controlled -WNL    Recs: continue current treatment             Keep well hydrated             Low Na diet             F/u with Urology             Monitor BP             F/u here in 3  months

## 2022-02-18 NOTE — ASSESSMENT & PLAN NOTE
New and decompensated problem. He developed pressure injury on the buttocks related to significant diarrhea as well as the amount of time he spends in his wheelchair. He was evaluated by healthy living at [facility] which is a home health care company out of [facility]. The nurses who provided wound care as well as a physical therapist recommend he received a Roho cushion for his wheelchair to help prevent future wounds and to offload this pressure point. He received his wheelchair from [facility] and would like to get the cushion from them as well. We will fax over the appropriate paperwork to their office.

## 2022-02-21 DIAGNOSIS — Z79.4 TYPE 2 DIABETES MELLITUS WITH OTHER SPECIFIED COMPLICATION, WITH LONG-TERM CURRENT USE OF INSULIN (HCC): ICD-10-CM

## 2022-02-21 DIAGNOSIS — E11.69 TYPE 2 DIABETES MELLITUS WITH OTHER SPECIFIED COMPLICATION, WITH LONG-TERM CURRENT USE OF INSULIN (HCC): ICD-10-CM

## 2022-02-28 ENCOUNTER — TELEPHONE (OUTPATIENT)
Dept: MEDICAL GROUP | Facility: PHYSICIAN GROUP | Age: 77
End: 2022-02-28
Payer: MEDICARE

## 2022-02-28 ENCOUNTER — TELEPHONE (OUTPATIENT)
Dept: CARDIOLOGY | Facility: MEDICAL CENTER | Age: 77
End: 2022-02-28
Payer: MEDICARE

## 2022-02-28 DIAGNOSIS — E11.69 TYPE 2 DIABETES MELLITUS WITH OTHER SPECIFIED COMPLICATION, WITH LONG-TERM CURRENT USE OF INSULIN (HCC): ICD-10-CM

## 2022-02-28 DIAGNOSIS — Z79.4 TYPE 2 DIABETES MELLITUS WITH OTHER SPECIFIED COMPLICATION, WITH LONG-TERM CURRENT USE OF INSULIN (HCC): ICD-10-CM

## 2022-02-28 RX ORDER — INSULIN GLARGINE 300 U/ML
14 INJECTION, SOLUTION SUBCUTANEOUS DAILY
Qty: 1.5 ML | Refills: 3 | Status: SHIPPED | OUTPATIENT
Start: 2022-02-28 | End: 2022-08-25

## 2022-02-28 NOTE — TELEPHONE ENCOUNTER
Received surgical clearance from [facility] for patient to have a bipolar transurethral resection of prostate scheduled on 03/09/22 with Rey Barry M.D.  P: [phone]   F: [phone]    Clearance placed in JM folder at [facility] desk.    Routed to  for recommendations.

## 2022-03-01 NOTE — TELEPHONE ENCOUNTER
Patient is moderate risk of cardiovascular complications for low to moderate risk surgery or procedures.  he is medically optimized based on my last in person assessment, and if his symptoms have not changed, surgery should proceed without further cardiac work-up.  Aspirin should be continued throughout the surgical period if possible due to his known history of coronary artery disease, however if surgically necessary, this medication is ok to stop prior to surgery.    Hold plavix for 7 days prior to surgery.

## 2022-03-01 NOTE — TELEPHONE ENCOUNTER
Printed JM surgical recommendations, faxed to [facility]. Received fax confirmation, scanned into [employer].     Called patient mobile number and patient identifier on , left detailed message stating to hold Plavix for 7 days prior to procedure.

## 2022-03-03 ENCOUNTER — HOSPITAL ENCOUNTER (OUTPATIENT)
Facility: MEDICAL CENTER | Age: 77
End: 2022-03-03
Attending: UROLOGY
Payer: MEDICARE

## 2022-03-03 PROCEDURE — 87077 CULTURE AEROBIC IDENTIFY: CPT

## 2022-03-03 PROCEDURE — 87086 URINE CULTURE/COLONY COUNT: CPT

## 2022-03-03 PROCEDURE — 87186 SC STD MICRODIL/AGAR DIL: CPT

## 2022-03-16 ENCOUNTER — TELEPHONE (OUTPATIENT)
Dept: CARDIOLOGY | Facility: MEDICAL CENTER | Age: 77
End: 2022-03-16
Payer: MEDICARE

## 2022-03-16 NOTE — TELEPHONE ENCOUNTER
BLANCO Roberto,    This patient's wife Usha called to state that his pharmacy [facility] is stating they need 's approval to refill the script for clopidogrel (PLAVIX) 75 MG Tab. Can you please call her back at [phone] once sent over to [facility].      Thank you,    ALYSSA  
Upon chart review patient is to continue medication.     Called [facility] and spoke to Elle she stated there are no refills. Advised we sent 1 year supply in November and if there was anything on hold and she stated there was refills. Advised I would notify the patient.     Called patient mobile number and spoke to the patient, advised there are refills. Answered all questions and concerns, appreciative of call.     
no

## 2022-03-31 ENCOUNTER — PATIENT MESSAGE (OUTPATIENT)
Dept: MEDICAL GROUP | Facility: PHYSICIAN GROUP | Age: 77
End: 2022-03-31
Payer: MEDICARE

## 2022-03-31 DIAGNOSIS — E11.69 TYPE 2 DIABETES MELLITUS WITH OTHER SPECIFIED COMPLICATION, WITH LONG-TERM CURRENT USE OF INSULIN (HCC): ICD-10-CM

## 2022-03-31 DIAGNOSIS — Z79.4 TYPE 2 DIABETES MELLITUS WITH OTHER SPECIFIED COMPLICATION, WITH LONG-TERM CURRENT USE OF INSULIN (HCC): ICD-10-CM

## 2022-03-31 RX ORDER — MULTIVITAMIN,THERAPEUTIC
1 TABLET ORAL DAILY
COMMUNITY
End: 2022-05-04

## 2022-03-31 RX ORDER — PEN NEEDLE, DIABETIC 30 GX3/16"
1 NEEDLE, DISPOSABLE MISCELLANEOUS
Qty: 500 EACH | Refills: 3 | Status: SHIPPED | OUTPATIENT
Start: 2022-03-31 | End: 2022-09-19 | Stop reason: SDUPTHER

## 2022-03-31 RX ORDER — PEN NEEDLE, DIABETIC 30 GX3/16"
1 NEEDLE, DISPOSABLE MISCELLANEOUS
Qty: 500 EACH | Refills: 3 | OUTPATIENT
Start: 2022-03-31

## 2022-03-31 RX ORDER — PEN NEEDLE, DIABETIC 30 GX3/16"
NEEDLE, DISPOSABLE MISCELLANEOUS
COMMUNITY
End: 2022-03-31 | Stop reason: SDUPTHER

## 2022-03-31 RX ORDER — SACCHAROMYCES BOULARDII 250 MG
250 CAPSULE ORAL 2 TIMES DAILY
Status: ON HOLD | COMMUNITY
End: 2022-05-31

## 2022-03-31 NOTE — TELEPHONE ENCOUNTER
Received request via: Pharmacy    Was the patient seen in the last year in this department? Yes 2/15/22    Does the patient have an active prescription (recently filled or refills available) for medication(s) requested? No

## 2022-03-31 NOTE — TELEPHONE ENCOUNTER
From: Av Bob  To: Physician Madison Bunch  Sent: 3/31/2022 2:43 PM PDT  Subject: Refill for pen needles    Hi Dr. Bunch,  I’ve been trying to get [facility] to refill Av’s BD Wendy pen needles [id number] and they keep telling me that you closed the prescription. I’ve told them that you wouldn’t do that and the prescription says he has 3+ refills before 10/18/22. Any help you can give would be appreciated. [facility]: [phone].  Thank you,   Usha

## 2022-04-19 ENCOUNTER — TELEPHONE (OUTPATIENT)
Dept: CARDIOLOGY | Facility: MEDICAL CENTER | Age: 77
End: 2022-04-19
Payer: MEDICARE

## 2022-04-20 NOTE — TELEPHONE ENCOUNTER
Can we call Av and check on his blood pressures off norvasc?  Agree with Dr. Bunch, if they are frequently <100, we should decrease losartan to 50mg PO daily. Otherwise we can just keep him off the norvasc and update the med list. Thanks!

## 2022-04-20 NOTE — TELEPHONE ENCOUNTER
----- Message from Madison Bunch D.O. sent at 3/31/2022  7:27 AM PDT -----  Regarding: FW: Blood pressure      ----- Message -----  From: Av Alejandro Bob  Sent: 3/28/2022  10:57 PM PDT  To: Madison Bunch D.O.  Subject: Blood pressure                                   Hi Dr. Bunch,  It sounds reasonable what you suggest. I’m pretty sure that his heart rate has not gotten below 60. I will pay more attention to that. I’ll start with holding the Amlodipine for a few days and see how he does. I will let you know. I think it would be great if you could reach out to Dr. Tucker and update him also.  Thank you for getting back to us and I’ll update you in a few days.  Usha

## 2022-04-21 ENCOUNTER — PATIENT MESSAGE (OUTPATIENT)
Dept: CARDIOLOGY | Facility: MEDICAL CENTER | Age: 77
End: 2022-04-21
Payer: MEDICARE

## 2022-04-21 NOTE — TELEPHONE ENCOUNTER
Pt wife Usha called back. Please try them again. PH#:[phone], or message on [employer]. [email or web address] phone is not working right.      Thank you,  Nalini DAHL

## 2022-04-29 ENCOUNTER — HOSPITAL ENCOUNTER (OUTPATIENT)
Dept: LAB | Facility: MEDICAL CENTER | Age: 77
End: 2022-04-29
Attending: INTERNAL MEDICINE
Payer: MEDICARE

## 2022-04-29 DIAGNOSIS — D64.9 ANEMIA, UNSPECIFIED TYPE: ICD-10-CM

## 2022-04-29 DIAGNOSIS — N18.32 STAGE 3B CHRONIC KIDNEY DISEASE: ICD-10-CM

## 2022-04-29 DIAGNOSIS — R33.9 URINARY RETENTION: ICD-10-CM

## 2022-04-29 DIAGNOSIS — I10 ESSENTIAL HYPERTENSION: ICD-10-CM

## 2022-04-29 DIAGNOSIS — E55.9 VITAMIN D DEFICIENCY: ICD-10-CM

## 2022-04-29 DIAGNOSIS — E11.69 TYPE 2 DIABETES MELLITUS WITH OTHER SPECIFIED COMPLICATION, WITH LONG-TERM CURRENT USE OF INSULIN (HCC): ICD-10-CM

## 2022-04-29 DIAGNOSIS — I73.9 PVD (PERIPHERAL VASCULAR DISEASE) (HCC): ICD-10-CM

## 2022-04-29 DIAGNOSIS — Z79.4 TYPE 2 DIABETES MELLITUS WITH OTHER SPECIFIED COMPLICATION, WITH LONG-TERM CURRENT USE OF INSULIN (HCC): ICD-10-CM

## 2022-04-29 DIAGNOSIS — E78.5 DYSLIPIDEMIA: ICD-10-CM

## 2022-04-29 LAB
25(OH)D3 SERPL-MCNC: 44 NG/ML (ref 30–100)
ANION GAP SERPL CALC-SCNC: 13 MMOL/L (ref 7–16)
BUN SERPL-MCNC: 33 MG/DL (ref 8–22)
CALCIUM SERPL-MCNC: 9 MG/DL (ref 8.5–10.5)
CHLORIDE SERPL-SCNC: 104 MMOL/L (ref 96–112)
CHOLEST SERPL-MCNC: 129 MG/DL (ref 100–199)
CO2 SERPL-SCNC: 24 MMOL/L (ref 20–33)
CREAT SERPL-MCNC: 1.55 MG/DL (ref 0.5–1.4)
ERYTHROCYTE [DISTWIDTH] IN BLOOD BY AUTOMATED COUNT: 46.9 FL (ref 35.9–50)
FASTING STATUS PATIENT QL REPORTED: NORMAL
GFR SERPLBLD CREATININE-BSD FMLA CKD-EPI: 46 ML/MIN/1.73 M 2
GLUCOSE SERPL-MCNC: 129 MG/DL (ref 65–99)
HCT VFR BLD AUTO: 42.6 % (ref 42–52)
HDLC SERPL-MCNC: 29 MG/DL
HGB BLD-MCNC: 13.9 G/DL (ref 14–18)
LDLC SERPL CALC-MCNC: 80 MG/DL
MCH RBC QN AUTO: 29.4 PG (ref 27–33)
MCHC RBC AUTO-ENTMCNC: 32.6 G/DL (ref 33.7–35.3)
MCV RBC AUTO: 90.3 FL (ref 81.4–97.8)
PLATELET # BLD AUTO: 287 K/UL (ref 164–446)
PMV BLD AUTO: 10.7 FL (ref 9–12.9)
POTASSIUM SERPL-SCNC: 4.1 MMOL/L (ref 3.6–5.5)
PTH-INTACT SERPL-MCNC: 23.5 PG/ML (ref 14–72)
RBC # BLD AUTO: 4.72 M/UL (ref 4.7–6.1)
SODIUM SERPL-SCNC: 141 MMOL/L (ref 135–145)
TRIGL SERPL-MCNC: 99 MG/DL (ref 0–149)
WBC # BLD AUTO: 9.4 K/UL (ref 4.8–10.8)

## 2022-04-29 PROCEDURE — 36415 COLL VENOUS BLD VENIPUNCTURE: CPT

## 2022-04-29 PROCEDURE — 83970 ASSAY OF PARATHORMONE: CPT

## 2022-04-29 PROCEDURE — 82306 VITAMIN D 25 HYDROXY: CPT

## 2022-04-29 PROCEDURE — 80061 LIPID PANEL: CPT

## 2022-04-29 PROCEDURE — 80048 BASIC METABOLIC PNL TOTAL CA: CPT

## 2022-04-29 PROCEDURE — 85027 COMPLETE CBC AUTOMATED: CPT

## 2022-04-29 RX ORDER — DULAGLUTIDE 3 MG/.5ML
0.5 INJECTION, SOLUTION SUBCUTANEOUS
Qty: 2 ML | Refills: 3 | Status: ON HOLD | OUTPATIENT
Start: 2022-04-30 | End: 2022-05-31

## 2022-04-30 ENCOUNTER — TELEPHONE (OUTPATIENT)
Dept: CARDIOLOGY | Facility: MEDICAL CENTER | Age: 77
End: 2022-04-30
Payer: MEDICARE

## 2022-04-30 RX ORDER — SULFAMETHOXAZOLE AND TRIMETHOPRIM 800; 160 MG/1; MG/1
TABLET ORAL
COMMUNITY
Start: 2022-03-08 | End: 2022-05-04

## 2022-04-30 RX ORDER — HYDROCODONE BITARTRATE AND ACETAMINOPHEN 5; 325 MG/1; MG/1
TABLET ORAL
COMMUNITY
Start: 2022-03-09 | End: 2022-05-04

## 2022-04-30 NOTE — TELEPHONE ENCOUNTER
Labs reviewed and cholesterol still not at goal. Please call him and let him know I recommend starting zetia 10mg PO Daily. We can start now or he can wait to discuss with me in clinic later this week. Other labs stable. Thanks!

## 2022-05-02 NOTE — TELEPHONE ENCOUNTER
Called patient mobile number and spoke to patient wife, relayed JM recommendations and she would prefer to discuss with JM at upcoming appt. Confirmed date and time.  Answered all questions and concerns, appreciative of call.

## 2022-05-03 DIAGNOSIS — M54.16 LUMBAR RADICULOPATHY: ICD-10-CM

## 2022-05-03 DIAGNOSIS — M79.2 NEUROPATHIC PAIN: ICD-10-CM

## 2022-05-03 RX ORDER — GABAPENTIN 100 MG/1
CAPSULE ORAL
Qty: 90 CAPSULE | Refills: 3 | Status: SHIPPED | OUTPATIENT
Start: 2022-05-03 | End: 2022-08-25

## 2022-05-04 ENCOUNTER — OFFICE VISIT (OUTPATIENT)
Dept: CARDIOLOGY | Facility: MEDICAL CENTER | Age: 77
End: 2022-05-04
Payer: MEDICARE

## 2022-05-04 VITALS
OXYGEN SATURATION: 98 % | HEIGHT: 72 IN | RESPIRATION RATE: 16 BRPM | SYSTOLIC BLOOD PRESSURE: 100 MMHG | HEART RATE: 63 BPM | BODY MASS INDEX: 26.41 KG/M2 | WEIGHT: 195 LBS | DIASTOLIC BLOOD PRESSURE: 60 MMHG

## 2022-05-04 DIAGNOSIS — G81.94 LEFT HEMIPARESIS (HCC): ICD-10-CM

## 2022-05-04 DIAGNOSIS — I73.9 PVD (PERIPHERAL VASCULAR DISEASE) (HCC): ICD-10-CM

## 2022-05-04 DIAGNOSIS — N18.31 STAGE 3A CHRONIC KIDNEY DISEASE: ICD-10-CM

## 2022-05-04 DIAGNOSIS — I48.0 PAROXYSMAL ATRIAL FIBRILLATION (HCC): ICD-10-CM

## 2022-05-04 DIAGNOSIS — I10 ESSENTIAL HYPERTENSION, BENIGN: ICD-10-CM

## 2022-05-04 DIAGNOSIS — Z86.73 HISTORY OF CEREBROVASCULAR ACCIDENT (CVA) IN ADULTHOOD: ICD-10-CM

## 2022-05-04 DIAGNOSIS — I25.10 CORONARY ARTERY DISEASE INVOLVING NATIVE CORONARY ARTERY OF NATIVE HEART WITHOUT ANGINA PECTORIS: ICD-10-CM

## 2022-05-04 PROCEDURE — 99215 OFFICE O/P EST HI 40 MIN: CPT | Performed by: PHYSICIAN ASSISTANT

## 2022-05-04 ASSESSMENT — ENCOUNTER SYMPTOMS
BLOOD IN STOOL: 0
SHORTNESS OF BREATH: 0
CHILLS: 0
DIARRHEA: 1
PALPITATIONS: 0
ORTHOPNEA: 0
DIZZINESS: 0
CLAUDICATION: 0
FEVER: 0
COUGH: 0

## 2022-05-04 ASSESSMENT — FIBROSIS 4 INDEX: FIB4 SCORE: 1.71

## 2022-05-04 NOTE — PROGRESS NOTES
Chief Complaint   Patient presents with   • Coronary Artery Disease     F/v Dx: Coronary artery disease involving native coronary artery   • Atrial Fibrillation     F/V Dx: Paroxysmal atrial fibrillation (HCC)       • Peripheral Vascular Disease (PVD)      Subjective:   Av Bob is a 77 y.o. male who presents today for follow-up.    Patient of Dr. Tucker.  Current medical problems include CAD (stents to RCA, LAD, OM over 10 years ago), AF, DM, HLD, CVA 2016 with residual L hemiparesis, HTN, CKD.    Av has been working on his blood pressure control with Dr. Bunch and Dr. Tucker.  He was experiencing symptomatic hypotension and recently discontinued the amlodipine.  Since the amlodipine has been discontinued he has had a resolution in his symptoms.  His wife has been checking his blood pressure about twice a day with fairly large range of readings.  Generally in the mornings they are systolic 100-110 and increasing to systolic 120s-30s in the evenings.  About once or twice a week his blood pressures noted to be greater than 140mmHg.     Av had his cholesterol panel checked last week.  Dr. Tucker had recommended adding Zetia to the high intensity atorvastatin and Av and his wife are concerned about some of the side effects that he can cause such as GI upset and muscle pains which Av already has both of them.  He asks about changing his diet to help with his cholesterol.  He does like to eat cheeseburgers.    Past Medical History:   Diagnosis Date   • ([facility]) Chronic ulcer of right lower extremity with fat layer exposed 12/27/2018   • Acute on chronic renal failure ([facility]) 1/21/2020   • Acute respiratory failure with hypoxia ([facility]) 5/20/2017   • CAD (coronary artery disease)     GIOVANNA to RCA in '97, GIOVANNA X2 to LAD and GIOVANNA X2 to OM in '09   • Cancer ([facility])     2017; chemo lympoma non hodgkins lymphoma   • Cataract     dez iol   • Cerebrovascular accident (CVA) ([facility]) 12/30/2016    Left arm weakness   etiology of stroke not established, lymphoma discovered on MRI evaluation of stroke, L hemiparesis much worse after acute infectious illness in mid 2017, but no specific diagnosed recurrent neurological etiology, all at [facility]   • Chickenpox    • CKD (chronic kidney disease) stage 3, GFR 30-59 ml/min ([facility])    • Controlled gout 2014   • Coronary atherosclerosis of native coronary artery     S/P PTCA (percutaneous transluminal coronary angioplasty), RCA, 5/1997, patent on cath 7/10/2009 at the time of interventions on his left anterior descending and circumflex coronary arteries   • Daytime sleepiness    • Depression    • Diabetes ([facility])    • Difficulty swallowing    • Enterococcal septicemia ([facility]) 8/12/2017   • Roberto hematuria 1/29/2020   • Frequent urination    • GERD (gastroesophageal reflux disease)    • Setswana measles    • Hypertension 06/30/2021    pt states well controlled on meds   • Hypokalemia 2012    controlled with combination of ACE inhibitor or ARB plus spironolactone   • Hypomagnesemia 08/12/2017    etiology uncertain   • Influenza A 1/26/2020   • Insomnia    • Kidney stone    • Leg edema, right 1/22/2020   • 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 Barry   • Normocytic hypochromic anemia 5/20/2017   • NSTEMI (non-ST elevated myocardial infarction) ([facility]) 07/18/2017    complicating UTI with sepsis   • Pain 06/30/2021    right leg foot   • Peripheral vascular disease ([facility])    • Polyneuropathy in diabetes(357.2) 9/11/2013   • Renal mass 1/21/2020   • Septic shock ([facility]) 5/20/2017   • Skin ulcer of calf ([facility]) 2015    Right, Dr. Terry and wound care   • Stroke ([facility]) 2016    left sided weakness   • Urinary bladder disorder    • Urinary incontinence     pt wears pads   • Weakness    • Wound of left leg 2012    Requiring surgery and debridment, Dr. Moore         Past Surgical  History:   Procedure Laterality Date   • VA INJ LUMBAR/SACRAL,W/ IMAGING  7/27/2021    Procedure: Lumbar L5-S1 interlaminar epidural steroid injection;  Surgeon: Harpal Bennett M.D.;  Location: [facility];  Service: Pain Management   • VA UPPER GI ENDOSCOPY,DIAGNOSIS N/A 7/21/2021    Procedure: GASTROSCOPY - AND DILATION;  Surgeon: Neville Huerta M.D.;  Location: SURGERY [facility] DAY [facility];  Service: Gastroenterology   • VA UPPER GI ENDOSCOPY,BIOPSY N/A 7/21/2021    Procedure: GASTROSCOPY, WITH BIOPSY;  Surgeon: Neville Huerta M.D.;  Location: SURGERY SAME DAY [facility];  Service: Gastroenterology   • LUMBAR TRANSFORAMINAL EPIDURAL STEROID INJECTION Right 3/29/2021    Procedure: RIGHT L3-4 and L4-5 transforaminal epidural steroid injection with fluoroscopic guidance;  Surgeon: Harpal Bennett M.D.;  Location: [facility];  Service: Pain Management   • LUMBAR TRANSFORAMINAL EPIDURAL STEROID INJECTION Right 11/2/2020    Procedure: INJECTION, STEROID, SPINE, LUMBAR, EPIDURAL, TRANSFORAMINAL APPROACH;  Surgeon: Harpal Bennett M.D.;  Location: [facility];  Service: Pain Management   • CYSTOSCOPY STENT PLACEMENT Left 2/12/2018    Procedure: CYSTOSCOPY  ;  Surgeon: Rey Barry M.D.;  Location: [facility];  Service: Urology   • URETEROSCOPY Left 2/12/2018    Procedure: URETEROSCOPY- FLEXIBLE  ;  Surgeon: Rey Barry M.D.;  Location: [facility];  Service: Urology   • LASERTRIPSY Left 2/12/2018    Procedure: LASERTRIPSY - LITHO  ;  Surgeon: Rey Barry M.D.;  Location: [facility];  Service: Urology   • WOUND CLOSURE GENERAL  4/3/2012    Performed by GERHARD GILBERT at SURGERY SAME DAY [facility] ORS   • ZZZ CARDIAC CATH  2009    Stents to LAD, Om   • DAGOBERTO BY LAPAROSCOPY  1998   • ANGIOPLASTY  1997    RCA followed by other stents as noted above.    • ZZZ CARDIAC CATH  1997    stent RCA   • CATARACT EXTRACTION WITH IOL       bilateral   • LITHOTRIPSY     • TONSILLECTOMY     • TONSILLECTOMY AND ADENOIDECTOMY           Family History   Problem Relation Age of Onset   • Heart Disease Father         CAD   • Diabetes Father    • Cancer Mother    • Psychiatric Illness Mother         Depression   • Depression Mother    • Kidney stones Brother    • Heart Disease Brother    • Psychiatric Illness Brother         Depression   • Depression Brother    • Suicide Attempts Other    • Psychiatric Illness Other         autism         Social History     Tobacco Use   Smoking Status Never Smoker   Smokeless Tobacco Never Used          Social History     Substance and Sexual Activity   Alcohol Use Never         Allergies   Allergen Reactions   • Diphenhydramine Anxiety     Pt is able to tolerate  Mg benadryl with less anxiety   • Diphenhydramine Hcl Anxiety     Pt is able to tolerate  Mg benadryl with less anxiety   • Lorazepam Unspecified     Disorientation   • Ciprofloxacin      Rash,stomach ache   • Spironolactone      Acute kidney injury         Outpatient Encounter Medications as of 5/4/2022   Medication Sig Dispense Refill   • gabapentin (NEURONTIN) 100 MG Cap TAKE 1 TO 3 CAPSULES BY MOUTH AT BEDTIME AS NEEDED FOR PAIN (Patient taking differently: Take 100 mg by mouth every day. TAKE 1 TO 3 CAPSULES BY MOUTH AT BEDTIME AS NEEDED FOR PAIN) 90 Capsule 3   • Dulaglutide (TRULICITY) 3 MG/0.5ML Solution Pen-injector Inject 0.5 mL under the skin every Saturday. 2 mL 3   • Insulin Pen Needle (PEN NEEDLES) 32G X 4 MM Misc 1 Each 5 Times a Day. USE FOR INSULIN SHOTS FIVE TIMES DAILY 500 Each 3   • saccharomyces boulardii (FLORASTOR) 250 MG Cap Take 250 mg by mouth 2 times a day.     • Insulin Pen Needle 32 G x 4 mm      • Insulin Glargine, 1 Unit Dial, (TOUJEO SOLOSTAR) 300 UNIT/ML Solution Pen-injector Inject 14 Units under the skin every day. Per wife Sliding Scale, per wife gave 22 units on 1/18/2022 1.5 mL 3   • glucose blood strip 1 Strip by Other route 4  Times a Day,Before Meals and at Bedtime. 400 Strip 3   • acetaminophen (TYLENOL) 500 MG Tab Take 1,000 mg by mouth every 6 hours as needed for Moderate Pain.     • Cholecalciferol (D3) 2000 UNIT Tab Take 2,000 Units by mouth every day.     • insulin lispro (HUMALOG,ADMELOG) 100 UNIT/ML Solution Pen-injector injection PEN Inject 5-9 Units under the skin 2 times daily, before breakfast and dinner. Start at 5 units then 2 units for every 50 points over 150     • potassium chloride (KLOR-CON) 8 MEQ tablet Take 1 Tablet by mouth every day. 90 Tablet 3   • metoprolol SR (TOPROL XL) 100 MG TABLET SR 24 HR Take 1 Tablet by mouth every day. (Patient taking differently: Take 100 mg by mouth at bedtime.) 90 Tablet 3   • losartan (COZAAR) 50 MG Tab Take 1 Tablet by mouth 2 times a day. 180 Tablet 3   • fenofibrate (TRICOR) 145 MG Tab Take 1 Tablet by mouth every day. (Patient taking differently: Take 145 mg by mouth every evening.) 90 Tablet 3   • clopidogrel (PLAVIX) 75 MG Tab Take 1 Tablet by mouth every day. (Patient taking differently: Take 75 mg by mouth every evening.) 90 Tablet 3   • atorvastatin (LIPITOR) 80 MG tablet Take 1 Tablet by mouth every evening. 90 Tablet 3   • omeprazole (PRILOSEC) 20 MG delayed-release capsule Take 20 mg by mouth every day.     • fluoxetine (PROZAC) 40 MG capsule TAKE 1 CAPSULE BY MOUTH EVERY DAY (Patient taking differently: Take 40 mg by mouth every day.) 90 capsule 3   • allopurinol (ZYLOPRIM) 100 MG Tab TAKE 1 TABLET BY MOUTH EVERY DAY (Patient taking differently: Take 100 mg by mouth every evening.) 100 tablet 3   • Multiple Vitamin (MULTI VITAMIN MENS PO) Take 1 Tablet by mouth every day.     • aspirin EC (ECOTRIN) 81 MG Tablet Delayed Response Take 81 mg by mouth every day.     • magnesium oxide (MAG-OX) 400 MG Tab Take 400 mg by mouth 4 times a day.     • finasteride (PROSCAR) 5 MG Tab Take 1 Tab by mouth every day. (Patient taking differently: Take 5 mg by mouth every evening.) 30  Tab 0   • alfuzosin (UROXATRAL) 10 MG SR tablet Take 10 mg by mouth every day.     • methenamine hip (HIPPREX) 1 GM Tab Take 1 g by mouth 2 times a day.     • [DISCONTINUED] HYDROcodone-acetaminophen (NORCO) 5-325 MG Tab per tablet TAKE 1 TABLET BY MOUTH EVERY 4 TO 6 HOURS AS NEEDED FOR 1 DAY     • [DISCONTINUED] sulfamethoxazole-trimethoprim (BACTRIM DS) 800-160 MG tablet TAKE 1 TABLET BY MOUTH EVERY 12 HOURS FOR 7 DAYS     • [DISCONTINUED] Multiple Vitamins-Minerals (ONCOVITE) Tab Take 1 Tablet by mouth every day. (Patient not taking: Reported on 5/4/2022)     • [DISCONTINUED] cefdinir (OMNICEF) 300 MG Cap cefdinir 300 mg capsule   TAKE 1 CAPSULE BY MOUTH EVERY 12 HOURS FOR 4 DAYS     • [DISCONTINUED] amLODIPine (NORVASC) 5 MG Tab Take 1 Tablet by mouth every day. 90 Tablet 3   • [DISCONTINUED] Probiotic Product (PROBIOTIC DAILY PO) Take 1 Cap by mouth 2 Times a Day. (Patient not taking: Reported on 5/4/2022)       No facility-administered encounter medications on file as of 5/4/2022.         Review of Systems   Constitutional: Negative for chills and fever.   Respiratory: Negative for cough and shortness of breath.    Cardiovascular: Negative for chest pain, palpitations, orthopnea, claudication and leg swelling.   Gastrointestinal: Positive for diarrhea. Negative for blood in stool and melena.   Neurological: Negative for dizziness.          Objective:   /60 (BP Location: Right arm, Patient Position: Sitting, BP Cuff Size: Adult)   Pulse 63   Resp 16   Ht 1.829 m (6')   Wt 88.5 kg (195 lb)   SpO2 98%   BMI 26.45 kg/m²        Physical Exam  Vitals and nursing note reviewed.   Constitutional:       Appearance: Normal appearance.      Comments: Arrives in wheelchair   HENT:      Head: Normocephalic and atraumatic.   Cardiovascular:      Rate and Rhythm: Normal rate and regular rhythm.      Heart sounds: No murmur heard.  Musculoskeletal:      Comments: Left upper extremity deformity with paresis    Neurological:      Mental Status: He is alert.            Assessment:     1. Coronary artery disease involving native coronary artery of native heart without angina pectoris     2. Essential hypertension, benign     3. PVD (peripheral vascular disease) (HCC)     4. Paroxysmal atrial fibrillation (HCC)     5. H/O Cerebrovascular accident (CVA) in adulthood     6. (HCC) Left hemiparesis     7. Stage 3a chronic kidney disease (HCC)          Medical Decision Making:  Today's Assessment / Plan:   Dyslipidemia  CAD s/p remote stenting to  RCA, LAD, OM   -last in 2009.   - no angina symptoms, is mostly in wheel chair, although does work with PT.   - on DAPT for very long term (over 4 years) I believe this was from his stroke. It seems reasonable to me to reduce to single antiplatelet agent (clopidogrel) and stop the aspirin. I suggested this to Av and Usha and they will speak with their PCP about the change too.   - LDL is above goal of 70 with high dose atorva. Recommend addition of Zetia. If this is not something they want to do we can start a PCSK9i. We also discussed avoid saturated fats in the diet and less animal based products         Essential hypertension, benign  - now with hypotension that was symptomatic and amlodipine was discontinued.  Blood pressures seem to be in a reasonable range.  If SBP greater than 140 consistently can discuss med regimen changes such as changing losartan to 100 mg once nightly and starting a 2.5 of amlodipine.     Paroxysmal atrial fibrillation in setting of sepsis/hospitalization   - ?single documented episode, I went back in his chart to 2017 and did not find if this was in conjunction with his CVA/TIA  - see Dr. Tucker's note for previous discussion of anticoagulation/GI bleeds  -continue metoprolol XL 100mg PO Daily     CKDstage 3  -followed by Dr. Jarvis.       Cerebrovascular accident (CVA) due to embolism of cerebral artery   In addition to cerebral enhancing lesions thought  to be lymphoma on MRI 12/2016, he was found to have a subacute infarct in the right anterolateral medulla. This doesn't seem to b   - reasonable to continue just single antiplatelet agent,       PAD without claudication/history of nonhealing wounds, resolved  -continue antiplatelet/statin     RTC in 6mo, sooner if problems  I personally spent a total of 50 minutes which includes face-to-face time and non-face-to-face time spent on preparing to see the patient, reviewing hospital notes and tests, obtaining history from the patient, performing a medically appropriate exam, counseling and educating the patient, ordering medications/tests/procedures/referrals as clinically indicated, and documenting information in the electronic medical record.

## 2022-05-18 ENCOUNTER — OFFICE VISIT (OUTPATIENT)
Dept: MEDICAL GROUP | Facility: PHYSICIAN GROUP | Age: 77
End: 2022-05-18
Payer: MEDICARE

## 2022-05-18 VITALS
HEIGHT: 70 IN | SYSTOLIC BLOOD PRESSURE: 110 MMHG | OXYGEN SATURATION: 98 % | WEIGHT: 198 LBS | TEMPERATURE: 97.3 F | DIASTOLIC BLOOD PRESSURE: 60 MMHG | BODY MASS INDEX: 28.35 KG/M2 | HEART RATE: 74 BPM | RESPIRATION RATE: 16 BRPM

## 2022-05-18 DIAGNOSIS — N13.8 BENIGN PROSTATIC HYPERPLASIA WITH URINARY OBSTRUCTION: ICD-10-CM

## 2022-05-18 DIAGNOSIS — Z12.12 SCREENING FOR COLORECTAL CANCER: ICD-10-CM

## 2022-05-18 DIAGNOSIS — E78.5 HYPERLIPIDEMIA ASSOCIATED WITH TYPE 2 DIABETES MELLITUS (HCC): ICD-10-CM

## 2022-05-18 DIAGNOSIS — E11.69 HYPERLIPIDEMIA ASSOCIATED WITH TYPE 2 DIABETES MELLITUS (HCC): ICD-10-CM

## 2022-05-18 DIAGNOSIS — N40.1 BENIGN PROSTATIC HYPERPLASIA WITH URINARY OBSTRUCTION: ICD-10-CM

## 2022-05-18 DIAGNOSIS — N18.32 STAGE 3B CHRONIC KIDNEY DISEASE: ICD-10-CM

## 2022-05-18 DIAGNOSIS — E11.69 TYPE 2 DIABETES MELLITUS WITH OTHER SPECIFIED COMPLICATION, WITH LONG-TERM CURRENT USE OF INSULIN (HCC): ICD-10-CM

## 2022-05-18 DIAGNOSIS — Z12.11 SCREENING FOR COLORECTAL CANCER: ICD-10-CM

## 2022-05-18 DIAGNOSIS — Z98.61 STATUS POST PERCUTANEOUS TRANSLUMINAL CORONARY ANGIOPLASTY: ICD-10-CM

## 2022-05-18 DIAGNOSIS — Z79.4 TYPE 2 DIABETES MELLITUS WITH OTHER SPECIFIED COMPLICATION, WITH LONG-TERM CURRENT USE OF INSULIN (HCC): ICD-10-CM

## 2022-05-18 PROBLEM — N28.1 RENAL CYST: Status: RESOLVED | Noted: 2020-01-29 | Resolved: 2022-05-18

## 2022-05-18 PROBLEM — S76.011A MUSCLE STRAIN OF RIGHT GLUTEAL REGION: Status: RESOLVED | Noted: 2019-05-20 | Resolved: 2022-05-18

## 2022-05-18 PROBLEM — N30.00 ACUTE CYSTITIS WITHOUT HEMATURIA: Status: RESOLVED | Noted: 2019-06-30 | Resolved: 2022-05-18

## 2022-05-18 PROBLEM — N13.30 HYDRONEPHROSIS: Status: RESOLVED | Noted: 2020-01-20 | Resolved: 2022-05-18

## 2022-05-18 PROBLEM — R32 URINARY INCONTINENCE: Status: RESOLVED | Noted: 2019-02-19 | Resolved: 2022-05-18

## 2022-05-18 PROBLEM — R29.898 LEFT HAND WEAKNESS: Status: RESOLVED | Noted: 2019-05-20 | Resolved: 2022-05-18

## 2022-05-18 PROBLEM — Z87.442 HISTORY OF RENAL CALCULI: Status: RESOLVED | Noted: 2019-11-19 | Resolved: 2022-05-18

## 2022-05-18 LAB
HBA1C MFR BLD: 6.6 % (ref 0–5.6)
INT CON NEG: ABNORMAL
INT CON POS: ABNORMAL

## 2022-05-18 PROCEDURE — 83036 HEMOGLOBIN GLYCOSYLATED A1C: CPT | Performed by: INTERNAL MEDICINE

## 2022-05-18 PROCEDURE — 99215 OFFICE O/P EST HI 40 MIN: CPT | Performed by: INTERNAL MEDICINE

## 2022-05-18 ASSESSMENT — FIBROSIS 4 INDEX: FIB4 SCORE: 1.71

## 2022-05-18 NOTE — ASSESSMENT & PLAN NOTE
Chronic and improved problem.  His Toujeo requirements have gone down tremendously.  He is now averaging 5 to 9 units of Toujeo daily, this was previously in the low 20s.  They will continue to adjust based on blood sugar readings.  Continue Trulicity 3 mg weekly and Humalog correction scale.  Continue follow-up with ophthalmology for known retinopathy.  Continue gabapentin for neuropathy.  Microalbuminuria is mild at this time, he is maxed out on losartan 50 mg twice daily. Refer to podiatry to establish care for toenail trimming.

## 2022-05-18 NOTE — PROGRESS NOTES
Subjective:   Chief Complaint/History of Present Illness:  Av Bob is a 77 y.o. male established patient who presents today to discuss medical problems as listed below. Av is accompanied by his wife, Usha.    Problem   Benign Prostatic Hyperplasia With Urinary Obstruction    S/p TURP in March 2022. Straight cath at home 1-2 times daily, follows with Dr. Barry of Urology.      Type 2 Diabetes Mellitus, With Long-Term Current Use of Insulin ([facility])     Latest Reference Range & Units 05/18/22 14:58   Glycohemoglobin 0.0 - 5.6 % 6.6 !     Micro Alb Creat Ratio 0 - 30 mg/g 42 High         Longstanding history of type 2 diabetes on insulin.  Previously followed with endocrinology, Dr. Rasheed.     Current regimen includes Trulicity 3 mg weekly,Toujeo 5-12 units daily, Humalog 5 units for sugars between 101-150, increase by 2 units if greater than 150.  Correction dosage is 2: 50 greater than 150.    Complicated by retinal involvement, neuropathy, CKDIII-IV, and microalbuminuria.       Stage 3b Chronic Kidney Disease (Hcc)     Latest Reference Range & Units 04/29/22 11:14   Bun 8 - 22 mg/dL 33 (H)   Creatinine 0.50 - 1.40 mg/dL 1.55 (H)   GFR (CKD-EPI) >60 mL/min/1.73 m 2 46 ! [1]     He has a history of chronic kidney disease related to nephrolithiasis, recurrent UTIs, and BPH as well as history of hypertension and diabetes.  He is following with nephrology, Dr. Jarvis and urology, Dr. Barry.    Spironolactone was discontinued due to worsening chronic kidney disease.    Other current renally excreted medications include losartan 50 mg twice daily, potassium daily, and Toujeo/Humalog.    Recent declining kidney function related to prostatitis and urinary retention, slowly improving.     Status Post Percutaneous Transluminal Coronary Angioplasty    Status post stenting.  Question about whether he needs to continue on dual antiplatelet therapy.     (HCC) Hyperlipidemia associated with type 2 diabetes mellitus      Latest Reference Range & Units 04/29/22 11:18   Cholesterol,Tot 100 - 199 mg/dL 129   Triglycerides 0 - 149 mg/dL 99   HDL >=40 mg/dL 29 !   LDL <100 mg/dL 80     History of stroke, CAD, DM2, and  hyperlipidemia. Currently on high dose atorvastatin 80 mg daily and fenofibrate 145 mg daily. Follows with cardiology, Dr. Tucker. Interested in considering adding PCSK9 inhibitor therapy in place of statin/fibrate as LDL not at goal.     Renal Cyst (Resolved)   Hydronephrosis (Resolved)   History of Renal Calculi (Resolved)   Acute Cystitis Without Hematuria (Resolved)   Muscle Strain of Right Gluteal Region (Resolved)   Left Hand Weakness (Resolved)   Urinary Incontinence (Resolved)        Current Medications:  Current Outpatient Medications Ordered in Epic   Medication Sig Dispense Refill   • gabapentin (NEURONTIN) 100 MG Cap TAKE 1 TO 3 CAPSULES BY MOUTH AT BEDTIME AS NEEDED FOR PAIN (Patient taking differently: Take 100 mg by mouth every day. TAKE 1 TO 3 CAPSULES BY MOUTH AT BEDTIME AS NEEDED FOR PAIN) 90 Capsule 3   • Dulaglutide (TRULICITY) 3 MG/0.5ML Solution Pen-injector Inject 0.5 mL under the skin every Saturday. 2 mL 3   • Insulin Pen Needle (PEN NEEDLES) 32G X 4 MM Misc 1 Each 5 Times a Day. USE FOR INSULIN SHOTS FIVE TIMES DAILY 500 Each 3   • saccharomyces boulardii (FLORASTOR) 250 MG Cap Take 250 mg by mouth 2 times a day.     • Insulin Pen Needle 32 G x 4 mm      • Insulin Glargine, 1 Unit Dial, (TOUJEO SOLOSTAR) 300 UNIT/ML Solution Pen-injector Inject 14 Units under the skin every day. Per wife Sliding Scale, per wife gave 22 units on 1/18/2022 1.5 mL 3   • glucose blood strip 1 Strip by Other route 4 Times a Day,Before Meals and at Bedtime. 400 Strip 3   • acetaminophen (TYLENOL) 500 MG Tab Take 1,000 mg by mouth every 6 hours as needed for Moderate Pain.     • Cholecalciferol (D3) 2000 UNIT Tab Take 2,000 Units by mouth every day.     • insulin lispro (HUMALOG,ADMELOG) 100 UNIT/ML Solution  "Pen-injector injection PEN Inject 5-9 Units under the skin 2 times daily, before breakfast and dinner. Start at 5 units then 2 units for every 50 points over 150     • potassium chloride (KLOR-CON) 8 MEQ tablet Take 1 Tablet by mouth every day. 90 Tablet 3   • metoprolol SR (TOPROL XL) 100 MG TABLET SR 24 HR Take 1 Tablet by mouth every day. (Patient taking differently: Take 100 mg by mouth at bedtime.) 90 Tablet 3   • losartan (COZAAR) 50 MG Tab Take 1 Tablet by mouth 2 times a day. 180 Tablet 3   • fenofibrate (TRICOR) 145 MG Tab Take 1 Tablet by mouth every day. (Patient taking differently: Take 145 mg by mouth every evening.) 90 Tablet 3   • clopidogrel (PLAVIX) 75 MG Tab Take 1 Tablet by mouth every day. (Patient taking differently: Take 75 mg by mouth every evening.) 90 Tablet 3   • atorvastatin (LIPITOR) 80 MG tablet Take 1 Tablet by mouth every evening. 90 Tablet 3   • omeprazole (PRILOSEC) 20 MG delayed-release capsule Take 20 mg by mouth every day.     • fluoxetine (PROZAC) 40 MG capsule TAKE 1 CAPSULE BY MOUTH EVERY DAY (Patient taking differently: Take 40 mg by mouth every day.) 90 capsule 3   • allopurinol (ZYLOPRIM) 100 MG Tab TAKE 1 TABLET BY MOUTH EVERY DAY (Patient taking differently: Take 100 mg by mouth every evening.) 100 tablet 3   • Multiple Vitamin (MULTI VITAMIN MENS PO) Take 1 Tablet by mouth every day.     • magnesium oxide (MAG-OX) 400 MG Tab Take 400 mg by mouth 4 times a day.     • alfuzosin (UROXATRAL) 10 MG SR tablet Take 10 mg by mouth every day.     • methenamine hip (HIPPREX) 1 GM Tab Take 1 g by mouth 2 times a day.       No current [facility]-ordered facility-administered medications on file.          Objective:   Physical Exam:    Vitals: /60 (BP Location: Right arm, Patient Position: Sitting, BP Cuff Size: Adult)   Pulse 74   Temp 36.3 °C (97.3 °F) (Temporal)   Resp 16   Ht 1.778 m (5' 10\")   Wt 89.8 kg (198 lb)   SpO2 98%    BMI: Body mass index is 28.41 kg/m².  Physical " Exam  Constitutional:       Comments: Chronically ill appearing, alert, no acute distress   Eyes:      General: No scleral icterus.     Conjunctiva/sclera: Conjunctivae normal.      Comments: Glasses in place   Cardiovascular:      Rate and Rhythm: Normal rate and regular rhythm.      Pulses: Normal pulses.   Pulmonary:      Effort: Pulmonary effort is normal. No respiratory distress.      Breath sounds: Normal breath sounds. No wheezing or rhonchi.   Musculoskeletal:      Comments: Diabetic Foot Exam: No ulcers/laceration/blisters present on bilateral feet, normal gross anatomy of bilateral feet without abnormal curvature or arch, no toe deformities, +toenail thickness, no ingrown toenails, no increase in skin temperature bilaterally, no skin erythema to bilateral feet, bilateral dorsalis pedis 1+ right foot and equal, Refill less than 2 seconds bilaterally, Dede 10 g monofilament testing absent in bilateral great toes, bilateral balls of feet at medial/lateral/mid ball of foot   Skin:     Findings: No bruising or rash.   Neurological:      Motor: Weakness present.      Gait: Gait abnormal.      Comments: Hemiplegia, ambulates with a wheelchair   Psychiatric:         Mood and Affect: Mood normal.         Behavior: Behavior normal.         Thought Content: Thought content normal.         Judgment: Judgment normal.          Assessment and Plan:   Av is a 77 y.o. male with the following:  Problem List Items Addressed This Visit     Status post percutaneous transluminal coronary angioplasty     Previous problem, stable, continue follow-up with cardiology as recommended.  We will stop aspirin and continue on monotherapy with platelet medicine with just Plavix 75 mg daily.           (HCC) Hyperlipidemia associated with type 2 diabetes mellitus     Chronic and ongoing problem.  LDL not on goal on maximum dose of statin.  He does have chronic muscle aching in his arms and legs.  Recheck to cardiology about help  with applying for patient assistance for PCSK9 inhibitor therapy and then hopefully can transition him off atorvastatin and fenofibrate to help ease his pill burden.           Stage 3b chronic kidney disease (HCC)     Chronic and ongoing problem, trending upward.  He has significant decline during his recurrent prostatitis and before his TURP which GFR down in the high 20s to low 30s.  He is back up to 46.  He will follow-up with his nephrologist tomorrow.  Still with decreased insulin demands, his Toujeo use is down to 5 to 9 units per evening.  Continue monitoring closely.           Type 2 diabetes mellitus, with long-term current use of insulin (HCC)     Chronic and improved problem.  His Toujeo requirements have gone down tremendously.  He is now averaging 5 to 9 units of Toujeo daily, this was previously in the low 20s.  They will continue to adjust based on blood sugar readings.  Continue Trulicity 3 mg weekly and Humalog correction scale.  Continue follow-up with ophthalmology for known retinopathy.  Continue gabapentin for neuropathy.  Microalbuminuria is mild at this time, he is maxed out on losartan 50 mg twice daily. Refer to podiatry to establish care for toenail trimming.           Relevant Orders    Diabetic Monofilament Lower Extremity Exam (Completed)    POCT  A1C (Completed)    Referral to Podiatry    Benign prostatic hyperplasia with urinary obstruction     Chronic and ongoing problem, continue follow up with urology as recommended.             Other Visit Diagnoses     Screening for colorectal cancer        Relevant Orders    COLOGUARD (FIT DNA)          RTC: Return in about 4 months (around 9/18/2022).    I spent a total of 42 minutes with record review, exam, communication with the patient, communication with other providers, and documentation of this encounter.    PLEASE NOTE: This dictation was created using voice recognition software. I have made every reasonable attempt to correct obvious  errors, but I expect that there are errors of grammar and possibly content that I did not discover before finalizing the note.      Madison Bunch, DO  Geriatric and Internal Medicine  [facility]

## 2022-05-18 NOTE — ASSESSMENT & PLAN NOTE
Chronic and ongoing problem.  LDL not on goal on maximum dose of statin.  He does have chronic muscle aching in his arms and legs.  Recheck to cardiology about help with applying for patient assistance for PCSK9 inhibitor therapy and then hopefully can transition him off atorvastatin and fenofibrate to help ease his pill burden.

## 2022-05-18 NOTE — ASSESSMENT & PLAN NOTE
Previous problem, stable, continue follow-up with cardiology as recommended.  We will stop aspirin and continue on monotherapy with platelet medicine with just Plavix 75 mg daily.

## 2022-05-18 NOTE — ASSESSMENT & PLAN NOTE
Chronic and ongoing problem, trending upward.  He has significant decline during his recurrent prostatitis and before his TURP which GFR down in the high 20s to low 30s.  He is back up to 46.  He will follow-up with his nephrologist tomorrow.  Still with decreased insulin demands, his Toujeo use is down to 5 to 9 units per evening.  Continue monitoring closely.

## 2022-05-19 ENCOUNTER — OFFICE VISIT (OUTPATIENT)
Dept: NEPHROLOGY | Facility: MEDICAL CENTER | Age: 77
End: 2022-05-19
Payer: MEDICARE

## 2022-05-19 VITALS
OXYGEN SATURATION: 98 % | TEMPERATURE: 98.8 F | BODY MASS INDEX: 26.82 KG/M2 | DIASTOLIC BLOOD PRESSURE: 70 MMHG | SYSTOLIC BLOOD PRESSURE: 118 MMHG | HEART RATE: 68 BPM | HEIGHT: 72 IN | WEIGHT: 198 LBS

## 2022-05-19 DIAGNOSIS — E55.9 VITAMIN D DEFICIENCY: ICD-10-CM

## 2022-05-19 DIAGNOSIS — D64.9 ANEMIA, UNSPECIFIED TYPE: ICD-10-CM

## 2022-05-19 DIAGNOSIS — R33.9 URINARY RETENTION: ICD-10-CM

## 2022-05-19 DIAGNOSIS — N18.31 STAGE 3A CHRONIC KIDNEY DISEASE: ICD-10-CM

## 2022-05-19 DIAGNOSIS — E11.29 MICROALBUMINURIA DUE TO TYPE 2 DIABETES MELLITUS (HCC): ICD-10-CM

## 2022-05-19 DIAGNOSIS — I10 ESSENTIAL HYPERTENSION: ICD-10-CM

## 2022-05-19 DIAGNOSIS — R80.9 MICROALBUMINURIA DUE TO TYPE 2 DIABETES MELLITUS (HCC): ICD-10-CM

## 2022-05-19 PROCEDURE — 99214 OFFICE O/P EST MOD 30 MIN: CPT | Performed by: INTERNAL MEDICINE

## 2022-05-19 ASSESSMENT — ENCOUNTER SYMPTOMS
ABDOMINAL PAIN: 0
SINUS PAIN: 0
VOMITING: 0
COUGH: 0
ORTHOPNEA: 0
HEMOPTYSIS: 0
WHEEZING: 0
PALPITATIONS: 0
CHILLS: 0
FEVER: 0
WEIGHT LOSS: 0
NAUSEA: 0
FLANK PAIN: 0
EYES NEGATIVE: 1
SHORTNESS OF BREATH: 0

## 2022-05-19 ASSESSMENT — FIBROSIS 4 INDEX: FIB4 SCORE: 1.71

## 2022-05-19 NOTE — PROGRESS NOTES
Subjective:      Av Bob is a 77 y.o. male who presents with Follow-Up and Chronic Kidney Disease            Chronic Kidney Disease  Pertinent negatives include no abdominal pain, chest pain, chills, congestion, coughing, fever, nausea or vomiting.     Av is coming today for f/u ofCKD III, urinary retention, hydronephrosis, recurrent UTI, s/p recent TURP  Michaels catheter pulled -self catheterization q AM  Urology following  HTN: BP well controlled  Vit D and PTH well controlled -to monitor  Anemia: Hb level - stable  CKD III creat better to 1.55     Review of Systems   Constitutional: Negative for chills, fever, malaise/fatigue and weight loss.   HENT: Negative for congestion, hearing loss and sinus pain.    Eyes: Negative.    Respiratory: Negative for cough, hemoptysis, shortness of breath and wheezing.    Cardiovascular: Negative for chest pain, palpitations, orthopnea and leg swelling.   Gastrointestinal: Negative for abdominal pain, nausea and vomiting.   Genitourinary: Negative for dysuria, flank pain, frequency, hematuria and urgency.   Skin: Negative.    All other systems reviewed and are negative.    Past Medical History:   Diagnosis Date   • ([facility]) Chronic ulcer of right lower extremity with fat layer exposed 12/27/2018   • Acute cystitis without hematuria 6/30/2019   • Acute on chronic renal failure ([facility]) 1/21/2020   • Acute respiratory failure with hypoxia ([facility]) 5/20/2017   • CAD (coronary artery disease)     GIOVANNA to RCA in '97, GIOVANNA X2 to LAD and GIOVANNA X2 to OM in '09   • Cancer ([facility])     2017; chemo lympoma non hodgkins lymphoma   • Cataract     dez iol   • Cerebrovascular accident (CVA) ([facility]) 12/30/2016    Left arm weakness  etiology of stroke not established, lymphoma discovered on MRI evaluation of stroke, L hemiparesis much worse after acute infectious illness in mid 2017, but no specific diagnosed recurrent neurological etiology, all at [facility]   •  Chickenpox    • CKD (chronic kidney disease) stage 3, GFR 30-59 ml/min ([facility])    • Controlled gout 2014   • Coronary atherosclerosis of native coronary artery     S/P PTCA (percutaneous transluminal coronary angioplasty), RCA, 5/1997, patent on cath 7/10/2009 at the time of interventions on his left anterior descending and circumflex coronary arteries   • Daytime sleepiness    • Depression    • Diabetes ([facility])    • Difficulty swallowing    • Enterococcal septicemia ([facility]) 8/12/2017   • Roberto hematuria 1/29/2020   • Frequent urination    • GERD (gastroesophageal reflux disease)    • Haitian measles    • History of renal calculi 11/19/2019   • Hydronephrosis 1/20/2020   • Hypertension 06/30/2021    pt states well controlled on meds   • Hypokalemia 2012    controlled with combination of ACE inhibitor or ARB plus spironolactone   • Hypomagnesemia 08/12/2017    etiology uncertain   • Influenza A 1/26/2020   • Insomnia    • Kidney stone    • Left hand weakness 5/20/2019   • Leg edema, right 1/22/2020   • 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 Barry   • Normocytic hypochromic anemia 5/20/2017   • NSTEMI (non-ST elevated myocardial infarction) ([facility]) 07/18/2017    complicating UTI with sepsis   • Pain 06/30/2021    right leg foot   • Peripheral vascular disease ([facility])    • Polyneuropathy in diabetes(357.2) 9/11/2013   • Renal cyst 1/29/2020   • Renal mass 1/21/2020   • Septic shock ([facility]) 5/20/2017   • Skin ulcer of calf ([facility]) 2015    Right, Dr. Terry and wound care   • Stroke ([facility]) 2016    left sided weakness   • Urinary bladder disorder    • Urinary incontinence     pt wears pads   • Weakness    • Wound of left leg 2012    Requiring surgery and debridment, Dr. Moore       Family History   Problem Relation Age of Onset   • Heart Disease Father         CAD   • Diabetes Father    •  Cancer Mother    • Psychiatric Illness Mother         Depression   • Depression Mother    • Kidney stones Brother    • Heart Disease Brother    • Psychiatric Illness Brother         Depression   • Depression Brother    • Suicide Attempts Other    • Psychiatric Illness Other         autism       Social History     Socioeconomic History   • Marital status:    Tobacco Use   • Smoking status: Never Smoker   • Smokeless tobacco: Never Used   Vaping Use   • Vaping Use: Never used   Substance and Sexual Activity   • Alcohol use: Never   • Drug use: Never   • Sexual activity: Not Currently     Partners: Female     Comment: , one daughter, 2 grands   Other Topics Concern   •  Service Yes   • Blood Transfusions No   • Caffeine Concern No   • Occupational Exposure No   • Hobby Hazards No   • Sleep Concern Yes   • Stress Concern No   • Weight Concern No   • Special Diet No   • Back Care No   • Exercise Yes   • Bike Helmet No   • Seat Belt Yes   • Self-Exams Yes   Social History Narrative    Av is from [place], [place] and raised in [place]. He has been in [place] since 2004. 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  He has been  to Usha since 2003, they met through a mutual friend. He has a daughter (Kalina) and a step son (Jimi).          Objective:     /70 (BP Location: Right arm, Patient Position: Sitting)   Pulse 68   Temp 37.1 °C (98.8 °F) (Temporal)   Ht 1.829 m (6')   Wt 89.8 kg (198 lb)   SpO2 98%   BMI 26.85 kg/m²          Physical Exam  Vitals reviewed.   Constitutional:       General: He is not in acute distress.     Appearance: Normal appearance. He is well-developed. He is not diaphoretic.   HENT:      Head: Normocephalic and atraumatic.       Nose: Nose normal.      Mouth/Throat:      Mouth: Mucous membranes are moist.      Pharynx: Oropharynx is clear.   Eyes:      Extraocular Movements: Extraocular movements intact.      Conjunctiva/sclera: Conjunctivae normal.      Pupils: Pupils are equal, round, and reactive to light.   Cardiovascular:      Rate and Rhythm: Normal rate and regular rhythm.      Pulses: Normal pulses.      Heart sounds: Normal heart sounds.     No friction rub. No gallop.   Pulmonary:      Effort: Pulmonary effort is normal. No respiratory distress.      Breath sounds: Normal breath sounds. No wheezing, rhonchi or rales.   Abdominal:      General: Bowel sounds are normal. There is no distension.      Palpations: Abdomen is soft. There is no mass.      Tenderness: There is no abdominal tenderness. There is no right CVA tenderness or left CVA tenderness.   Musculoskeletal:      Cervical back: Normal range of motion and neck supple.      Right lower leg: No edema.      Left lower leg: No edema.   Skin:     General: Skin is warm.      Coloration: Skin is not pale.      Findings: No erythema or rash.   Neurological:      General: No focal deficit present.      Mental Status: He is alert and oriented to person, place, and time.      Cranial Nerves: No cranial nerve deficit.      Coordination: Coordination normal.   Psychiatric:         Mood and Affect: Mood normal.         Behavior: Behavior normal.         Thought Content: Thought content normal.         Judgment: Judgment normal.            Laboratory results reviewed: d/w pt    Lab Results   Component Value Date/Time    CREATININE 1.55 (H) 04/29/2022 11:14 AM    CREATININE 1.4 05/28/2008 05:42 PM    POTASSIUM 4.1 04/29/2022 11:14 AM     Assessment and Plan    1.CATA/CKD IIIa-creat level improving -to monitor  2.HTN: BP well controlled -to monitor  3.Electrolytes: well controlled.  4.Anemia: Hb level stable  5.Urinary retention/hydronephrosis - s/p TURP -f/u with Urology  6.Volume:well  controlled  7.Microalbuminuria due to DM II - well controlled to monitor -on ARB  8.Vit D def: vit D and PTH well controlled -WNL    Recs: continue current treatment             Keep well hydrated             Low Na diet             F/u with Urology             Monitor BP             F/u here in 3-4  months

## 2022-05-19 NOTE — PATIENT INSTRUCTIONS
Continue current treatment  Keep well hydrated  Monitor BP and if BP < 110/60 reduce losartan dose tpo 1 tablet daily  Low salt diet

## 2022-05-24 ENCOUNTER — HOSPITAL ENCOUNTER (OUTPATIENT)
Facility: MEDICAL CENTER | Age: 77
End: 2022-05-24
Attending: INTERNAL MEDICINE
Payer: MEDICARE

## 2022-05-24 ENCOUNTER — OFFICE VISIT (OUTPATIENT)
Dept: MEDICAL GROUP | Facility: PHYSICIAN GROUP | Age: 77
End: 2022-05-24
Payer: MEDICARE

## 2022-05-24 VITALS
HEIGHT: 72 IN | WEIGHT: 198 LBS | HEART RATE: 81 BPM | TEMPERATURE: 97.3 F | BODY MASS INDEX: 26.82 KG/M2 | DIASTOLIC BLOOD PRESSURE: 56 MMHG | RESPIRATION RATE: 14 BRPM | OXYGEN SATURATION: 96 % | SYSTOLIC BLOOD PRESSURE: 98 MMHG

## 2022-05-24 DIAGNOSIS — N12 PYELONEPHRITIS: ICD-10-CM

## 2022-05-24 DIAGNOSIS — R11.12 PROJECTILE VOMITING WITH NAUSEA: ICD-10-CM

## 2022-05-24 LAB
APPEARANCE UR: ABNORMAL
BACTERIA #/AREA URNS HPF: ABNORMAL /HPF
BILIRUB UR QL STRIP.AUTO: NEGATIVE
COLOR UR: ABNORMAL
EPI CELLS #/AREA URNS HPF: ABNORMAL /HPF
GLUCOSE UR STRIP.AUTO-MCNC: NEGATIVE MG/DL
HYALINE CASTS #/AREA URNS LPF: ABNORMAL /LPF
KETONES UR STRIP.AUTO-MCNC: ABNORMAL MG/DL
LEUKOCYTE ESTERASE UR QL STRIP.AUTO: ABNORMAL
MICRO URNS: ABNORMAL
NITRITE UR QL STRIP.AUTO: NEGATIVE
PH UR STRIP.AUTO: 6 [PH] (ref 5–8)
PROT UR QL STRIP: 300 MG/DL
RBC # URNS HPF: ABNORMAL /HPF
RBC UR QL AUTO: ABNORMAL
SP GR UR STRIP.AUTO: 1.02
UROBILINOGEN UR STRIP.AUTO-MCNC: 0.2 MG/DL
WBC #/AREA URNS HPF: ABNORMAL /HPF

## 2022-05-24 PROCEDURE — 81001 URINALYSIS AUTO W/SCOPE: CPT

## 2022-05-24 PROCEDURE — 87186 SC STD MICRODIL/AGAR DIL: CPT

## 2022-05-24 PROCEDURE — 99214 OFFICE O/P EST MOD 30 MIN: CPT | Performed by: INTERNAL MEDICINE

## 2022-05-24 PROCEDURE — 87086 URINE CULTURE/COLONY COUNT: CPT

## 2022-05-24 PROCEDURE — 87077 CULTURE AEROBIC IDENTIFY: CPT

## 2022-05-24 RX ORDER — ONDANSETRON 4 MG/1
4 TABLET, ORALLY DISINTEGRATING ORAL EVERY 8 HOURS PRN
Qty: 20 TABLET | Refills: 1 | Status: SHIPPED | OUTPATIENT
Start: 2022-05-24 | End: 2022-08-10

## 2022-05-24 RX ORDER — SULFAMETHOXAZOLE AND TRIMETHOPRIM 800; 160 MG/1; MG/1
1 TABLET ORAL 2 TIMES DAILY
Qty: 28 TABLET | Refills: 0 | Status: ON HOLD | OUTPATIENT
Start: 2022-05-24 | End: 2022-05-31

## 2022-05-24 ASSESSMENT — FIBROSIS 4 INDEX: FIB4 SCORE: 1.71

## 2022-05-24 NOTE — ASSESSMENT & PLAN NOTE
He will have a straight cath collection from his wife today to drop off at the lab and we will initiate Bactrim for 7 to 14 days pending improvement and lab/urine results.  I have ordered a CBC and a metabolic panel but due to his weakness she is not sure she will be able to get into the lab.  I think may be a good idea for her to call dispatch health to also arrange for IV fluids at home if he continues to have low p.o. intake and they can also assist with lab evaluation if needed.  Discussed that they can hold his antihypertensives.  If he has recurrence of nausea or vomiting then they can administer Zofran 4 mg up to 3 times daily.  Follow blood sugar closely and continue reducing insulin.  Will need update urology pending results.  Last urinalysis from March 2022 showed pansensitive Klebsiella pneumonia.

## 2022-05-24 NOTE — ASSESSMENT & PLAN NOTE
New problem, working diagnosis is pyelonephritis.  Will start Bactrim and send in ondansetron to have on hand if he has recurrence of nausea or vomiting.  They will keep me updated with how he is feeling over my chart.  Also reach out to [facility] for evaluation 1 to 2 days for recheck and if they are unable to get his labs done at that point or if his dehydration worsens then they can help with lab draw and IV fluid if indicated.  They will keep me updated over my chart in the meantime.

## 2022-05-24 NOTE — PROGRESS NOTES
Subjective:   Chief Complaint/History of Present Illness:  Av Bob is a 77 y.o. male established patient who presents today to discuss medical problems as listed below. Av is accompanied by his wife, Usha.    Problem   Pyelonephritis    Av had abrupt onset of illness starting on Sunday.  He felt unwell and then had projectile vomiting x3 episodes after dinner that evening.  He had associated nausea but no overt abdominal pain.  He has continued to have anorexia and is having difficulty with his p.o. intake.  He did get in some fruit in a month and yesterday and about 50 to 60 ounces of water.  He has chronic urinary retention requiring straight catheterization by his wife.  She has noted that his urine has definitely turned a dark and riddhi color in the past 2 days.  He is more lethargic and has generalized weakness.  His fasting blood sugar this morning was 115.  His blood pressures been running low with systolic in the low 80s to high 90s.  She has held his losartan.  He has been tolerating his other medicines okay.  No overt fever but there have been some intermittent sweats/chills.     Projectile Vomiting With Nausea    Right legHe had abrupt onset of nausea and vomiting this past Sunday.  He had 3 episodes of projectile vomiting with his entire dinner.  He was able to keep down fruit and a muffin yesterday.  Nauseous but also does not have much of an appetite.  He has been trying to get in p.o. intake with water and drink between 50 to 60 ounces yesterday.          Current Medications:  Current Outpatient Medications Ordered in Epic   Medication Sig Dispense Refill   • ondansetron (ZOFRAN ODT) 4 MG TABLET DISPERSIBLE Take 1 Tablet by mouth every 8 hours as needed for Nausea. 20 Tablet 1   • sulfamethoxazole-trimethoprim (BACTRIM DS) 800-160 MG tablet Take 1 Tablet by mouth 2 times a day for 14 days. 28 Tablet 0   • gabapentin (NEURONTIN) 100 MG Cap TAKE 1 TO 3 CAPSULES BY MOUTH AT BEDTIME  AS NEEDED FOR PAIN (Patient taking differently: Take 100 mg by mouth every day. TAKE 1 TO 3 CAPSULES BY MOUTH AT BEDTIME AS NEEDED FOR PAIN) 90 Capsule 3   • Dulaglutide (TRULICITY) 3 MG/0.5ML Solution Pen-injector Inject 0.5 mL under the skin every Saturday. 2 mL 3   • Insulin Pen Needle (PEN NEEDLES) 32G X 4 MM Misc 1 Each 5 Times a Day. USE FOR INSULIN SHOTS FIVE TIMES DAILY 500 Each 3   • saccharomyces boulardii (FLORASTOR) 250 MG Cap Take 250 mg by mouth 2 times a day.     • Insulin Pen Needle 32 G x 4 mm      • Insulin Glargine, 1 Unit Dial, (TOUJEO SOLOSTAR) 300 UNIT/ML Solution Pen-injector Inject 14 Units under the skin every day. Per wife Sliding Scale, per wife gave 22 units on 1/18/2022 1.5 mL 3   • glucose blood strip 1 Strip by Other route 4 Times a Day,Before Meals and at Bedtime. 400 Strip 3   • acetaminophen (TYLENOL) 500 MG Tab Take 1,000 mg by mouth every 6 hours as needed for Moderate Pain.     • Cholecalciferol (D3) 2000 UNIT Tab Take 2,000 Units by mouth every day.     • insulin lispro (HUMALOG,ADMELOG) 100 UNIT/ML Solution Pen-injector injection PEN Inject 5-9 Units under the skin 2 times daily, before breakfast and dinner. Start at 5 units then 2 units for every 50 points over 150     • potassium chloride (KLOR-CON) 8 MEQ tablet Take 1 Tablet by mouth every day. 90 Tablet 3   • metoprolol SR (TOPROL XL) 100 MG TABLET SR 24 HR Take 1 Tablet by mouth every day. (Patient taking differently: Take 100 mg by mouth at bedtime.) 90 Tablet 3   • losartan (COZAAR) 50 MG Tab Take 1 Tablet by mouth 2 times a day. 180 Tablet 3   • fenofibrate (TRICOR) 145 MG Tab Take 1 Tablet by mouth every day. (Patient taking differently: Take 145 mg by mouth every evening.) 90 Tablet 3   • clopidogrel (PLAVIX) 75 MG Tab Take 1 Tablet by mouth every day. (Patient taking differently: Take 75 mg by mouth every evening.) 90 Tablet 3   • atorvastatin (LIPITOR) 80 MG tablet Take 1 Tablet by mouth every evening. 90 Tablet 3    • omeprazole (PRILOSEC) 20 MG delayed-release capsule Take 20 mg by mouth every day.     • fluoxetine (PROZAC) 40 MG capsule TAKE 1 CAPSULE BY MOUTH EVERY DAY (Patient taking differently: Take 40 mg by mouth every day.) 90 capsule 3   • allopurinol (ZYLOPRIM) 100 MG Tab TAKE 1 TABLET BY MOUTH EVERY DAY (Patient taking differently: Take 100 mg by mouth every evening.) 100 tablet 3   • Multiple Vitamin (MULTI VITAMIN MENS PO) Take 1 Tablet by mouth every day.     • magnesium oxide (MAG-OX) 400 MG Tab Take 400 mg by mouth 4 times a day.     • alfuzosin (UROXATRAL) 10 MG SR tablet Take 10 mg by mouth every day.     • methenamine hip (HIPPREX) 1 GM Tab Take 1 g by mouth 2 times a day.       No current [facility]-ordered facility-administered medications on file.          Objective:   Physical Exam:    Vitals: BP (!) 98/56 (BP Location: Right arm, Patient Position: Sitting, BP Cuff Size: Adult)   Pulse 81   Temp 36.3 °C (97.3 °F) (Temporal)   Resp 14   Ht 1.829 m (6')   Wt 89.8 kg (198 lb)   SpO2 96%    BMI: Body mass index is 26.85 kg/m².  Physical Exam  Constitutional:       Appearance: He is ill-appearing. He is not toxic-appearing or diaphoretic.      Comments: Appears fatigued and acutely ill   Eyes:      General: No scleral icterus.     Conjunctiva/sclera: Conjunctivae normal.   Cardiovascular:      Rate and Rhythm: Normal rate.      Pulses: Normal pulses.   Pulmonary:      Effort: Pulmonary effort is normal. No respiratory distress.      Breath sounds: Normal breath sounds. No wheezing or rhonchi.   Abdominal:      Palpations: Abdomen is soft.      Tenderness: There is no abdominal tenderness. There is no guarding.   Musculoskeletal:      Right lower leg: No edema.      Left lower leg: No edema.   Skin:     General: Skin is warm and dry.      Findings: No rash.   Neurological:      Gait: Gait abnormal.      Comments: Generalized weakness, using wheelchair for ambulation   Psychiatric:      Comments: More  lethargic than baseline          Assessment and Plan:   Av is a 77 y.o. male with the following:  Problem List Items Addressed This Visit     Pyelonephritis     He will have a straight cath collection from his wife today to drop off at the lab and we will initiate Bactrim for 7 to 14 days pending improvement and lab/urine results.  I have ordered a CBC and a metabolic panel but due to his weakness she is not sure she will be able to get into the lab.  I think may be a good idea for her to call [facility] to also arrange for IV fluids at home if he continues to have low p.o. intake and they can also assist with lab evaluation if needed.  Discussed that they can hold his antihypertensives.  If he has recurrence of nausea or vomiting then they can administer Zofran 4 mg up to 3 times daily.  Follow blood sugar closely and continue reducing insulin.  Will need update urology pending results.  Last urinalysis from March 2022 showed pansensitive Klebsiella pneumonia.           Relevant Medications    sulfamethoxazole-trimethoprim (BACTRIM DS) 800-160 MG tablet    Other Relevant Orders    CBC WITH DIFFERENTIAL    Comp Metabolic Panel    URINALYSIS,CULTURE IF INDICATED    Projectile vomiting with nausea     New problem, working diagnosis is pyelonephritis.  Will start Bactrim and send in ondansetron to have on hand if he has recurrence of nausea or vomiting.  They will keep me updated with how he is feeling over my chart.  Also reach out to [facility] for evaluation 1 to 2 days for recheck and if they are unable to get his labs done at that point or if his dehydration worsens then they can help with lab draw and IV fluid if indicated.  They will keep me updated over my chart in the meantime.           Relevant Medications    ondansetron (ZOFRAN ODT) 4 MG TABLET DISPERSIBLE           RTC: Return if symptoms worsen or fail to improve.    I spent a total of 34 minutes with record review, exam, communication with  the patient, communication with other providers, and documentation of this encounter.    PLEASE NOTE: This dictation was created using voice recognition software. I have made every reasonable attempt to correct obvious errors, but I expect that there are errors of grammar and possibly content that I did not discover before finalizing the note.      Madison Bunch, DO  Geriatric and Internal Medicine  [facility]

## 2022-05-28 ENCOUNTER — APPOINTMENT (OUTPATIENT)
Dept: RADIOLOGY | Facility: MEDICAL CENTER | Age: 77
DRG: 699 | End: 2022-05-28
Attending: EMERGENCY MEDICINE
Payer: MEDICARE

## 2022-05-28 ENCOUNTER — HOSPITAL ENCOUNTER (INPATIENT)
Facility: MEDICAL CENTER | Age: 77
LOS: 3 days | DRG: 699 | End: 2022-05-31
Attending: EMERGENCY MEDICINE | Admitting: HOSPITALIST
Payer: MEDICARE

## 2022-05-28 DIAGNOSIS — Z86.73 HISTORY OF CEREBROVASCULAR ACCIDENT (CVA) IN ADULTHOOD: ICD-10-CM

## 2022-05-28 DIAGNOSIS — M1A.09X0 IDIOPATHIC CHRONIC GOUT OF MULTIPLE SITES WITHOUT TOPHUS: ICD-10-CM

## 2022-05-28 DIAGNOSIS — N18.32 STAGE 3B CHRONIC KIDNEY DISEASE: ICD-10-CM

## 2022-05-28 DIAGNOSIS — N30.00 ACUTE CYSTITIS WITHOUT HEMATURIA: ICD-10-CM

## 2022-05-28 DIAGNOSIS — Z99.3 WHEELCHAIR DEPENDENCE: ICD-10-CM

## 2022-05-28 PROBLEM — E86.0 DEHYDRATION: Status: ACTIVE | Noted: 2022-05-28

## 2022-05-28 PROBLEM — E87.1 HYPONATREMIA: Status: ACTIVE | Noted: 2022-05-28

## 2022-05-28 PROBLEM — N39.0 UTI (URINARY TRACT INFECTION): Status: ACTIVE | Noted: 2022-05-28

## 2022-05-28 LAB
ANION GAP SERPL CALC-SCNC: 13 MMOL/L (ref 7–16)
APPEARANCE UR: ABNORMAL
BACTERIA #/AREA URNS HPF: ABNORMAL /HPF
BASOPHILS # BLD AUTO: 0.4 % (ref 0–1.8)
BASOPHILS # BLD: 0.06 K/UL (ref 0–0.12)
BILIRUB UR QL STRIP.AUTO: NEGATIVE
BUN SERPL-MCNC: 28 MG/DL (ref 8–22)
CALCIUM SERPL-MCNC: 8.8 MG/DL (ref 8.4–10.2)
CHLORIDE SERPL-SCNC: 98 MMOL/L (ref 96–112)
CO2 SERPL-SCNC: 20 MMOL/L (ref 20–33)
COLOR UR: YELLOW
CREAT SERPL-MCNC: 1.63 MG/DL (ref 0.5–1.4)
EOSINOPHIL # BLD AUTO: 0.26 K/UL (ref 0–0.51)
EOSINOPHIL NFR BLD: 1.5 % (ref 0–6.9)
EPI CELLS #/AREA URNS HPF: ABNORMAL /HPF
ERYTHROCYTE [DISTWIDTH] IN BLOOD BY AUTOMATED COUNT: 45.3 FL (ref 35.9–50)
GFR SERPLBLD CREATININE-BSD FMLA CKD-EPI: 44 ML/MIN/1.73 M 2
GLUCOSE BLD STRIP.AUTO-MCNC: 92 MG/DL (ref 65–99)
GLUCOSE BLD STRIP.AUTO-MCNC: 96 MG/DL (ref 65–99)
GLUCOSE SERPL-MCNC: 125 MG/DL (ref 65–99)
GLUCOSE UR STRIP.AUTO-MCNC: NEGATIVE MG/DL
HCT VFR BLD AUTO: 38.7 % (ref 42–52)
HGB BLD-MCNC: 12.7 G/DL (ref 14–18)
IMM GRANULOCYTES # BLD AUTO: 0.29 K/UL (ref 0–0.11)
IMM GRANULOCYTES NFR BLD AUTO: 1.7 % (ref 0–0.9)
KETONES UR STRIP.AUTO-MCNC: NEGATIVE MG/DL
LACTATE BLD-SCNC: 1.4 MMOL/L (ref 0.5–2)
LACTATE BLD-SCNC: 1.5 MMOL/L (ref 0.5–2)
LACTATE BLD-SCNC: 1.7 MMOL/L (ref 0.5–2)
LEUKOCYTE ESTERASE UR QL STRIP.AUTO: ABNORMAL
LYMPHOCYTES # BLD AUTO: 0.59 K/UL (ref 1–4.8)
LYMPHOCYTES NFR BLD: 3.5 % (ref 22–41)
MCH RBC QN AUTO: 28.9 PG (ref 27–33)
MCHC RBC AUTO-ENTMCNC: 32.8 G/DL (ref 33.7–35.3)
MCV RBC AUTO: 88 FL (ref 81.4–97.8)
MICRO URNS: ABNORMAL
MONOCYTES # BLD AUTO: 0.89 K/UL (ref 0–0.85)
MONOCYTES NFR BLD AUTO: 5.2 % (ref 0–13.4)
MUCOUS THREADS #/AREA URNS HPF: ABNORMAL /HPF
NEUTROPHILS # BLD AUTO: 14.98 K/UL (ref 1.82–7.42)
NEUTROPHILS NFR BLD: 87.7 % (ref 44–72)
NITRITE UR QL STRIP.AUTO: NEGATIVE
NRBC # BLD AUTO: 0.02 K/UL
NRBC BLD-RTO: 0.1 /100 WBC
PH UR STRIP.AUTO: 6 [PH] (ref 5–8)
PLATELET # BLD AUTO: 350 K/UL (ref 164–446)
PMV BLD AUTO: 10 FL (ref 9–12.9)
POTASSIUM SERPL-SCNC: 4.2 MMOL/L (ref 3.6–5.5)
PROT UR QL STRIP: NEGATIVE MG/DL
RBC # BLD AUTO: 4.4 M/UL (ref 4.7–6.1)
RBC # URNS HPF: ABNORMAL /HPF
RBC UR QL AUTO: ABNORMAL
SODIUM SERPL-SCNC: 131 MMOL/L (ref 135–145)
SP GR UR STRIP.AUTO: 1.02
UNIDENT CRYS URNS QL MICRO: ABNORMAL /HPF
WBC # BLD AUTO: 17.1 K/UL (ref 4.8–10.8)
WBC #/AREA URNS HPF: ABNORMAL /HPF
YEAST #/AREA URNS HPF: ABNORMAL /HPF
YEAST HYPHAE #/AREA URNS HPF: PRESENT /HPF

## 2022-05-28 PROCEDURE — 83605 ASSAY OF LACTIC ACID: CPT | Mod: 91

## 2022-05-28 PROCEDURE — 770006 HCHG ROOM/CARE - MED/SURG/GYN SEMI*

## 2022-05-28 PROCEDURE — 99285 EMERGENCY DEPT VISIT HI MDM: CPT

## 2022-05-28 PROCEDURE — 87040 BLOOD CULTURE FOR BACTERIA: CPT | Mod: 91

## 2022-05-28 PROCEDURE — 96365 THER/PROPH/DIAG IV INF INIT: CPT

## 2022-05-28 PROCEDURE — 700105 HCHG RX REV CODE 258: Performed by: HOSPITALIST

## 2022-05-28 PROCEDURE — 81001 URINALYSIS AUTO W/SCOPE: CPT

## 2022-05-28 PROCEDURE — 36415 COLL VENOUS BLD VENIPUNCTURE: CPT

## 2022-05-28 PROCEDURE — 82962 GLUCOSE BLOOD TEST: CPT | Mod: 91

## 2022-05-28 PROCEDURE — 99223 1ST HOSP IP/OBS HIGH 75: CPT | Mod: AI | Performed by: HOSPITALIST

## 2022-05-28 PROCEDURE — 700102 HCHG RX REV CODE 250 W/ 637 OVERRIDE(OP): Performed by: HOSPITALIST

## 2022-05-28 PROCEDURE — A9270 NON-COVERED ITEM OR SERVICE: HCPCS | Performed by: HOSPITALIST

## 2022-05-28 PROCEDURE — 700105 HCHG RX REV CODE 258: Performed by: EMERGENCY MEDICINE

## 2022-05-28 PROCEDURE — 71045 X-RAY EXAM CHEST 1 VIEW: CPT

## 2022-05-28 PROCEDURE — 85025 COMPLETE CBC W/AUTO DIFF WBC: CPT

## 2022-05-28 PROCEDURE — 80048 BASIC METABOLIC PNL TOTAL CA: CPT

## 2022-05-28 PROCEDURE — 700111 HCHG RX REV CODE 636 W/ 250 OVERRIDE (IP): Performed by: HOSPITALIST

## 2022-05-28 PROCEDURE — 700111 HCHG RX REV CODE 636 W/ 250 OVERRIDE (IP): Performed by: EMERGENCY MEDICINE

## 2022-05-28 RX ORDER — LANOLIN ALCOHOL/MO/W.PET/CERES
400 CREAM (GRAM) TOPICAL DAILY
Status: DISCONTINUED | OUTPATIENT
Start: 2022-05-29 | End: 2022-05-29

## 2022-05-28 RX ORDER — SODIUM CHLORIDE 9 MG/ML
1000 INJECTION, SOLUTION INTRAVENOUS ONCE
Status: DISCONTINUED | OUTPATIENT
Start: 2022-05-28 | End: 2022-05-28

## 2022-05-28 RX ORDER — LOSARTAN POTASSIUM 25 MG/1
50 TABLET ORAL 2 TIMES DAILY
Status: DISCONTINUED | OUTPATIENT
Start: 2022-05-28 | End: 2022-05-31 | Stop reason: HOSPADM

## 2022-05-28 RX ORDER — ONDANSETRON 4 MG/1
4 TABLET, ORALLY DISINTEGRATING ORAL EVERY 4 HOURS PRN
Status: DISCONTINUED | OUTPATIENT
Start: 2022-05-28 | End: 2022-05-31 | Stop reason: HOSPADM

## 2022-05-28 RX ORDER — CEFTRIAXONE 2 G/1
2 INJECTION, POWDER, FOR SOLUTION INTRAMUSCULAR; INTRAVENOUS ONCE
Status: DISCONTINUED | OUTPATIENT
Start: 2022-05-28 | End: 2022-05-28

## 2022-05-28 RX ORDER — METHENAMINE HIPPURATE 1000 MG/1
1 TABLET ORAL 2 TIMES DAILY
Status: DISCONTINUED | OUTPATIENT
Start: 2022-05-28 | End: 2022-05-28

## 2022-05-28 RX ORDER — OMEPRAZOLE 20 MG/1
20 CAPSULE, DELAYED RELEASE ORAL DAILY
Status: DISCONTINUED | OUTPATIENT
Start: 2022-05-28 | End: 2022-05-28

## 2022-05-28 RX ORDER — METOPROLOL SUCCINATE 100 MG/1
100 TABLET, EXTENDED RELEASE ORAL EVERY EVENING
Status: DISCONTINUED | OUTPATIENT
Start: 2022-05-28 | End: 2022-05-31 | Stop reason: HOSPADM

## 2022-05-28 RX ORDER — ATORVASTATIN CALCIUM 40 MG/1
80 TABLET, FILM COATED ORAL EVERY EVENING
Status: DISCONTINUED | OUTPATIENT
Start: 2022-05-28 | End: 2022-05-31 | Stop reason: HOSPADM

## 2022-05-28 RX ORDER — GABAPENTIN 100 MG/1
100 CAPSULE ORAL
Status: DISCONTINUED | OUTPATIENT
Start: 2022-05-28 | End: 2022-05-31 | Stop reason: HOSPADM

## 2022-05-28 RX ORDER — SODIUM CHLORIDE 9 MG/ML
2000 INJECTION, SOLUTION INTRAVENOUS ONCE
Status: COMPLETED | OUTPATIENT
Start: 2022-05-28 | End: 2022-05-28

## 2022-05-28 RX ORDER — SODIUM CHLORIDE 9 MG/ML
2000 INJECTION, SOLUTION INTRAVENOUS CONTINUOUS
Status: ACTIVE | OUTPATIENT
Start: 2022-05-28 | End: 2022-05-29

## 2022-05-28 RX ORDER — ONDANSETRON 2 MG/ML
4 INJECTION INTRAMUSCULAR; INTRAVENOUS EVERY 4 HOURS PRN
Status: DISCONTINUED | OUTPATIENT
Start: 2022-05-28 | End: 2022-05-31 | Stop reason: HOSPADM

## 2022-05-28 RX ORDER — ALLOPURINOL 100 MG/1
100 TABLET ORAL
Status: DISCONTINUED | OUTPATIENT
Start: 2022-05-29 | End: 2022-05-31 | Stop reason: HOSPADM

## 2022-05-28 RX ORDER — FLUOXETINE HYDROCHLORIDE 20 MG/1
40 CAPSULE ORAL DAILY
Status: DISCONTINUED | OUTPATIENT
Start: 2022-05-29 | End: 2022-05-31 | Stop reason: HOSPADM

## 2022-05-28 RX ORDER — SACCHAROMYCES BOULARDII 250 MG
250 CAPSULE ORAL 2 TIMES DAILY
Status: DISCONTINUED | OUTPATIENT
Start: 2022-05-28 | End: 2022-05-28

## 2022-05-28 RX ORDER — CLOPIDOGREL BISULFATE 75 MG/1
75 TABLET ORAL
Status: DISCONTINUED | OUTPATIENT
Start: 2022-05-29 | End: 2022-05-31 | Stop reason: HOSPADM

## 2022-05-28 RX ORDER — ACETAMINOPHEN 325 MG/1
650 TABLET ORAL EVERY 6 HOURS PRN
Status: DISCONTINUED | OUTPATIENT
Start: 2022-05-28 | End: 2022-05-30

## 2022-05-28 RX ORDER — FENOFIBRATE 134 MG/1
134 CAPSULE ORAL EVERY EVENING
Status: DISCONTINUED | OUTPATIENT
Start: 2022-05-28 | End: 2022-05-31 | Stop reason: HOSPADM

## 2022-05-28 RX ORDER — TAMSULOSIN HYDROCHLORIDE 0.4 MG/1
0.4 CAPSULE ORAL DAILY
Status: DISCONTINUED | OUTPATIENT
Start: 2022-05-29 | End: 2022-05-31 | Stop reason: HOSPADM

## 2022-05-28 RX ADMIN — ATORVASTATIN CALCIUM 80 MG: 40 TABLET, FILM COATED ORAL at 21:45

## 2022-05-28 RX ADMIN — SODIUM CHLORIDE 2000 ML: 9 INJECTION, SOLUTION INTRAVENOUS at 18:42

## 2022-05-28 RX ADMIN — PIPERACILLIN AND TAZOBACTAM 3.38 G: 3; .375 INJECTION, POWDER, LYOPHILIZED, FOR SOLUTION INTRAVENOUS; PARENTERAL at 16:19

## 2022-05-28 RX ADMIN — SODIUM CHLORIDE 2000 ML: 9 INJECTION, SOLUTION INTRAVENOUS at 15:15

## 2022-05-28 RX ADMIN — INSULIN GLARGINE-YFGN 11 UNITS: 100 INJECTION, SOLUTION SUBCUTANEOUS at 21:52

## 2022-05-28 RX ADMIN — FENOFIBRATE 134 MG: 134 CAPSULE ORAL at 21:45

## 2022-05-28 RX ADMIN — PIPERACILLIN AND TAZOBACTAM 3.38 G: 3; .375 INJECTION, POWDER, LYOPHILIZED, FOR SOLUTION INTRAVENOUS; PARENTERAL at 21:45

## 2022-05-28 ASSESSMENT — ENCOUNTER SYMPTOMS
FEVER: 0
EYE REDNESS: 0
BRUISES/BLEEDS EASILY: 0
EYE DISCHARGE: 0
ABDOMINAL PAIN: 0
VOMITING: 0
COUGH: 0
FLANK PAIN: 0
FOCAL WEAKNESS: 0
CHILLS: 1
DIARRHEA: 1
STRIDOR: 0
MYALGIAS: 0
SHORTNESS OF BREATH: 0
NERVOUS/ANXIOUS: 0

## 2022-05-28 ASSESSMENT — PAIN DESCRIPTION - PAIN TYPE: TYPE: CHRONIC PAIN;ACUTE PAIN

## 2022-05-28 ASSESSMENT — FIBROSIS 4 INDEX: FIB4 SCORE: 1.71

## 2022-05-28 ASSESSMENT — PATIENT HEALTH QUESTIONNAIRE - PHQ9
2. FEELING DOWN, DEPRESSED, IRRITABLE, OR HOPELESS: NOT AT ALL
SUM OF ALL RESPONSES TO PHQ9 QUESTIONS 1 AND 2: 0
1. LITTLE INTEREST OR PLEASURE IN DOING THINGS: NOT AT ALL

## 2022-05-28 NOTE — ED NOTES
Patient's wife reports diarrhea developing after being on antibiotics since Tuesday. Patient moved to single room and placed on enteric precautions. Report given to Jordyn Chaudhry.

## 2022-05-28 NOTE — ASSESSMENT & PLAN NOTE
With hyperglycemia  Last glycated hemoglobin was 6.6 %  Long & short acting insulin  Accu-Checks, hypoglycemia protocol

## 2022-05-28 NOTE — ASSESSMENT & PLAN NOTE
Currently on antibiotics, latest cultures showing staph aureus and Klebsiella pneumonia  He has had ongoing UTI since 2020.  Staph aureus pansensitive - 5/24/22 and 03/05/2021  Klebsiella pneumoniae pansensitive - 3/3/22, 6/25/20, 5/14/20   Proteus mirabilis - resistant to minocycline, nitrofurantoin - 11/20/2021 through 01/22/22    Started on Zosyn in the emergency room. Changed to IV cefazolin.  Recent culture 5/24/22 showed S. Aureus (MSSA).  Patient did not tolerate Bactrim, caused him loose stools. C. Diff negative.  Counseled wife on possible sources of recurrent UTI, including contamination from home self catheterization (MSSA), stool (Klebsiella) and hx of nephrolithasis (Proteus).

## 2022-05-28 NOTE — H&P
[facility] Medicine History & Physical Note    Date of Service  5/28/2022    Primary Care Physician  Madison Bunch D.O.    Consultants  None     Code Status  Full Code    Chief Complaint  Chief Complaint   Patient presents with   • Weakness     Weakness. Taking antibiotics for UTI. Unable to eat.     History of Presenting Illness  Av Bob is a 77 y.o. male with a past medical history of hypertension, dyslipidemia, gout and diabetes with recurrent urinary tract infections currently on antibiotics who presented 5/28/2022 with generalized weakness and diarrhea.  Patient not feeling better despite oral antibiotics.  Has worsening weakness intermittent chills but no documented fever.  Patient wife also reports having multiple episodes of loose bowel movements.  She did not notice any blood or mucus in stool.    I discussed the plan of care with emergency department physician, patient's wife was present at bedside and the patient.    Review of Systems  Review of Systems   Constitutional: Positive for chills and malaise/fatigue. Negative for fever.   Eyes: Negative for discharge and redness.   Respiratory: Negative for cough, shortness of breath and stridor.    Cardiovascular: Negative for chest pain and leg swelling.   Gastrointestinal: Positive for diarrhea. Negative for abdominal pain and vomiting.   Genitourinary: Negative for flank pain.   Musculoskeletal: Negative for myalgias.   Skin: Negative.    Neurological: Negative for focal weakness.   Endo/Heme/Allergies: Does not bruise/bleed easily.   Psychiatric/Behavioral: The patient is not nervous/anxious.      Past Medical History   has a past medical history of ([facility]) Chronic ulcer of right lower extremity with fat layer exposed (12/27/2018), Acute cystitis without hematuria (6/30/2019), Acute on chronic renal failure ([facility]) (1/21/2020), Acute respiratory failure with hypoxia ([facility]) (5/20/2017), CAD (coronary artery disease), Cancer ([facility]),  Cataract, Cerebrovascular accident (CVA) ([facility]) (12/30/2016), Chickenpox, CKD (chronic kidney disease) stage 3, GFR 30-59 ml/min ([facility]), Controlled gout (2014), Coronary atherosclerosis of native coronary artery, Daytime sleepiness, Depression, Diabetes ([facility]), Difficulty swallowing, Enterococcal septicemia ([facility]) (8/12/2017), Roberto hematuria (1/29/2020), Frequent urination, GERD (gastroesophageal reflux disease), [place] citizen measles, History of renal calculi (11/19/2019), Hydronephrosis (1/20/2020), Hypertension (06/30/2021), Hypokalemia (2012), Hypomagnesemia (08/12/2017), Influenza A (1/26/2020), Insomnia, Kidney stone, Left hand weakness (5/20/2019), Leg edema, right (1/22/2020), Lymphoma ([facility]) (2/19/2017), Mixed hyperlipidemia, Muscle strain of right gluteal region (5/20/2019), Nephrolithiasis (2006), Normocytic hypochromic anemia (5/20/2017), NSTEMI (non-ST elevated myocardial infarction) ([facility]) (07/18/2017), Pain (06/30/2021), Peripheral vascular disease ([facility]), Polyneuropathy in diabetes(357.2) (9/11/2013), Renal cyst (1/29/2020), Renal mass (1/21/2020), Septic shock ([facility]) (5/20/2017), Skin ulcer of calf ([facility]) (2015), Stroke ([facility]) (2016), Urinary bladder disorder, Urinary incontinence, Weakness, and Wound of left leg (2012).    Surgical History   has a past surgical history that includes lithotripsy; angioplasty (1997); wound closure general (4/3/2012); tonsillectomy and adenoidectomy; cataract extraction with iol; solo by laparoscopy (1998); cystoscopy stent placement (Left, 2/12/2018); ureteroscopy (Left, 2/12/2018); lasertripsy (Left, 2/12/2018); [facility] cardiac cath (1997); [facility] cardiac cath (2009); tonsillectomy; lumbar transforaminal epidural steroid injection (Right, 11/2/2020); lumbar transforaminal epidural steroid injection (Right, 3/29/2021); pr upper gi endoscopy,diagnosis (N/A, 7/21/2021); pr upper gi endoscopy,biopsy (N/A, 7/21/2021); and pr inj lumbar/sacral,w/ imaging (7/27/2021).     Family History  family  history includes Cancer in his mother; Depression in his brother and mother; Diabetes in his father; Heart Disease in his brother and father; Kidney stones in his brother; Psychiatric Illness in his brother, mother, and another family member; Suicide Attempts in an other family member.       Social History   reports that he has never smoked. He has never used smokeless tobacco. He reports that he does not drink alcohol and does not use drugs.    Allergies  Allergies   Allergen Reactions   • Diphenhydramine Anxiety     Pt is able to tolerate  Mg benadryl with less anxiety   • Diphenhydramine Hcl Anxiety     Pt is able to tolerate  Mg benadryl with less anxiety   • Lorazepam Unspecified     Disorientation   • Ciprofloxacin      Rash,stomach ache   • Spironolactone      Acute kidney injury     Medications  Prior to Admission Medications   Prescriptions Last Dose Informant Patient Reported? Taking?   Cholecalciferol (D3) 2000 UNIT Tab  Significant Other Yes No   Sig: Take 2,000 Units by mouth every day.   Dulaglutide (TRULICITY) 3 MG/0.5ML Solution Pen-injector   No No   Sig: Inject 0.5 mL under the skin every Saturday.   Insulin Glargine, 1 Unit Dial, (TOUJEO SOLOSTAR) 300 UNIT/ML Solution Pen-injector   No No   Sig: Inject 14 Units under the skin every day. Per wife Sliding Scale, per wife gave 22 units on 2022   Insulin Pen Needle (PEN NEEDLES) 32G X 4 MM Misc   No No   Si Each 5 Times a Day. USE FOR INSULIN SHOTS FIVE TIMES DAILY   Insulin Pen Needle 32 G x 4 mm   Yes No   Multiple Vitamin (MULTI VITAMIN MENS PO)  Significant Other Yes No   Sig: Take 1 Tablet by mouth every day.   acetaminophen (TYLENOL) 500 MG Tab  Significant Other Yes No   Sig: Take 1,000 mg by mouth every 6 hours as needed for Moderate Pain.   alfuzosin (UROXATRAL) 10 MG SR tablet  Significant Other Yes No   Sig: Take 10 mg by mouth every day.   allopurinol (ZYLOPRIM) 100 MG Tab  Significant Other No No   Sig: TAKE 1 TABLET BY MOUTH  EVERY DAY   Patient taking differently: Take 100 mg by mouth every evening.   atorvastatin (LIPITOR) 80 MG tablet  Significant Other No No   Sig: Take 1 Tablet by mouth every evening.   clopidogrel (PLAVIX) 75 MG Tab  Significant Other No No   Sig: Take 1 Tablet by mouth every day.   Patient taking differently: Take 75 mg by mouth every evening.   fenofibrate (TRICOR) 145 MG Tab  Significant Other No No   Sig: Take 1 Tablet by mouth every day.   Patient taking differently: Take 145 mg by mouth every evening.   fluoxetine (PROZAC) 40 MG capsule  Significant Other No No   Sig: TAKE 1 CAPSULE BY MOUTH EVERY DAY   Patient taking differently: Take 40 mg by mouth every day.   gabapentin (NEURONTIN) 100 MG Cap   No No   Sig: TAKE 1 TO 3 CAPSULES BY MOUTH AT BEDTIME AS NEEDED FOR PAIN   Patient taking differently: Take 100 mg by mouth every day. TAKE 1 TO 3 CAPSULES BY MOUTH AT BEDTIME AS NEEDED FOR PAIN   glucose blood strip   No No   Si Strip by Other route 4 Times a Day,Before Meals and at Bedtime.   insulin lispro (HUMALOG,ADMELOG) 100 UNIT/ML Solution Pen-injector injection PEN  Significant Other Yes No   Sig: Inject 5-9 Units under the skin 2 times daily, before breakfast and dinner. Start at 5 units then 2 units for every 50 points over 150   losartan (COZAAR) 50 MG Tab  Significant Other No No   Sig: Take 1 Tablet by mouth 2 times a day.   magnesium oxide (MAG-OX) 400 MG Tab  Significant Other Yes No   Sig: Take 400 mg by mouth 4 times a day.   methenamine hip (HIPPREX) 1 GM Tab  Significant Other Yes No   Sig: Take 1 g by mouth 2 times a day.   metoprolol SR (TOPROL XL) 100 MG TABLET SR 24 HR  Significant Other No No   Sig: Take 1 Tablet by mouth every day.   Patient taking differently: Take 100 mg by mouth at bedtime.   omeprazole (PRILOSEC) 20 MG delayed-release capsule  Significant Other Yes No   Sig: Take 20 mg by mouth every day.   ondansetron (ZOFRAN ODT) 4 MG TABLET DISPERSIBLE   No No   Sig: Take 1  Tablet by mouth every 8 hours as needed for Nausea.   potassium chloride (KLOR-CON) 8 MEQ tablet  Significant Other No No   Sig: Take 1 Tablet by mouth every day.   saccharomyces boulardii (FLORASTOR) 250 MG Cap   Yes No   Sig: Take 250 mg by mouth 2 times a day.   sulfamethoxazole-trimethoprim (BACTRIM DS) 800-160 MG tablet   No No   Sig: Take 1 Tablet by mouth 2 times a day for 14 days.      Facility-Administered Medications: None     Physical Exam  Temp:  [36.1 °C (96.9 °F)] 36.1 °C (96.9 °F)  Pulse:  [62-72] 65  Resp:  [14-24] 22  BP: ()/(57-72) 102/60  SpO2:  [95 %-98 %] 96 %  Blood Pressure : 108/57   Temperature: 36.1 °C (96.9 °F)   Pulse: 67   Respiration: 14   Pulse Oximetry: 97 %     Physical Exam  Constitutional:       General: He is not in acute distress.     Appearance: He is ill-appearing and diaphoretic.   HENT:      Head: Atraumatic.      Right Ear: External ear normal.      Left Ear: External ear normal.      Nose: No congestion or rhinorrhea.      Mouth/Throat:      Mouth: Mucous membranes are moist.   Eyes:      General: No scleral icterus.        Right eye: No discharge.         Left eye: No discharge.      Pupils: Pupils are equal, round, and reactive to light.   Cardiovascular:      Rate and Rhythm: Normal rate and regular rhythm.   Pulmonary:      Effort: Pulmonary effort is normal.   Abdominal:      General: There is no distension.   Musculoskeletal:      Cervical back: Neck supple. No rigidity. No muscular tenderness.      Right lower leg: No edema.      Left lower leg: No edema.   Skin:     Coloration: Skin is not jaundiced or pale.   Neurological:      Mental Status: He is alert and oriented to person, place, and time.      Motor: Weakness (At baseline) present.      Coordination: Coordination abnormal.   Psychiatric:         Mood and Affect: Mood normal.       Laboratory:  Recent Labs     05/28/22  1506   WBC 17.1*   RBC 4.40*   HEMOGLOBIN 12.7*   HEMATOCRIT 38.7*   MCV 88.0   MCH  28.9   MCHC 32.8*   RDW 45.3   PLATELETCT 350   MPV 10.0     Recent Labs     05/28/22  1506   SODIUM 131*   POTASSIUM 4.2   CHLORIDE 98   CO2 20   GLUCOSE 125*   BUN 28*   CREATININE 1.63*   CALCIUM 8.8     Recent Labs     05/28/22  1506   GLUCOSE 125*         No results for input(s): NTPROBNP in the last 72 hours.      No results for input(s): TROPONINT in the last 72 hours.    Imaging:  DX-CHEST-PORTABLE (1 VIEW)   Final Result      No acute cardiac or pulmonary abnormalities are identified.        Assessment/Plan:  Justification for Admission Status  I anticipate this patient will require at least two midnights for appropriate medical management, necessitating inpatient admission because Patient failed outpatient antibiotics, will require intravenous antibiotics, has multiple comorbid conditions including diabetes, chronic kidney disease and is wheelchair dependent    Recurrent UTI- (present on admission)  Assessment & Plan  Currently on antibiotics, latest cultures showing staph aureus and Klebsiella pneumonia  Started on Zosyn in the emergency room, continue for now    Hyponatremia- (present on admission)  Assessment & Plan  Hypovolemic hyponatremia  I expect Na to improve with IVF    Dehydration- (present on admission)  Assessment & Plan  Likely secondary to reduced oral intake and increase insensory losses  Encourage oral intake as tolerated, antiemetics as needed.  Gentle intravenous hydration until adequate oral intake is achieved    Essential hypertension, benign- (present on admission)  Assessment & Plan  Resume home losartan and metoprolol with hold parameters    Diarrhea- (present on admission)  Assessment & Plan  Likely secondary to antibiotics.  Rule out C. difficile colitis    Type 2 diabetes mellitus, with long-term current use of insulin ([facility])- (present on admission)  Assessment & Plan  With hyperglycemia  Last glycated hemoglobin was 6.6 %  Long & short acting insulin  Accu-Checks, hypoglycemia  protocol     Stage 3b chronic kidney disease (HCC)- (present on admission)  Assessment & Plan  Avoid/minimize nephrotoxins as much as possible, renally dose medications, monitor inputs and outputs     Idiopathic chronic gout of multiple sites without tophus- (present on admission)  Assessment & Plan  Resume home allopurinol    GERD with esophagitis- (present on admission)  Assessment & Plan  Resume home omeprazole    VTE prophylaxis: SCDs/TEDs and Xarelto 10 mg daily as prophylaxis

## 2022-05-28 NOTE — ED PROVIDER NOTES
ED Provider Note    Scribed for Tate Capps M.D. by Tate Capps M.D.. 5/28/2022,  2:25 PM.    CHIEF COMPLAINT  Chief Complaint   Patient presents with   • Weakness     Weakness. Taking antibiotics for UTI. Unable to eat.       [facility]  Av Bob is a 77 y.o. male who presents to the Emergency Department with a history of chronic urinary tract infections, self catheterizes to urinate, and a very complicated past medical history including CKD, diabetes, left-sided hemiparesis because of previous stroke, and other chronic problems, presents to the emergency department because of generalized weakness.  As of today, his wife was not able to help him get out of bed.  He was able to get out of bed yesterday.  He has been taking antibiotics for urinary tract infection since the 24th.  Today is the 28th.  He has not had fevers, nausea or vomiting, constipation or diarrhea until this morning, when he had several episodes of diarrhea.  His wife reports that he has almost always had significant diarrhea from antibiotic treatment.  He was seen twice by mobile urgent care this week, given IV fluids, but was told that his kidney function which apparently had shown some sign of CATA, was improving.    Labs, reviewed for completeness, show that on the 24th, he had a positive urinalysis, and culture showed pan sensitivity.    REVIEW OF SYSTEMS  See [facility] for further details. All other systems are negative.     PAST MEDICAL HISTORY   has a past medical history of ([facility]) Chronic ulcer of right lower extremity with fat layer exposed (12/27/2018), Acute cystitis without hematuria (6/30/2019), Acute on chronic renal failure ([facility]) (1/21/2020), Acute respiratory failure with hypoxia ([facility]) (5/20/2017), CAD (coronary artery disease), Cancer ([facility]), Cataract, Cerebrovascular accident (CVA) ([facility]) (12/30/2016), Chickenpox, CKD (chronic kidney disease) stage 3, GFR 30-59 ml/min ([facility]), Controlled gout (2014), Coronary  atherosclerosis of native coronary artery, Daytime sleepiness, Depression, Diabetes ([facility]), Difficulty swallowing, Enterococcal septicemia ([facility]) (8/12/2017), Roberto hematuria (1/29/2020), Frequent urination, GERD (gastroesophageal reflux disease), Setswana measles, History of renal calculi (11/19/2019), Hydronephrosis (1/20/2020), Hypertension (06/30/2021), Hypokalemia (2012), Hypomagnesemia (08/12/2017), Influenza A (1/26/2020), Insomnia, Kidney stone, Left hand weakness (5/20/2019), Leg edema, right (1/22/2020), Lymphoma ([facility]) (2/19/2017), Mixed hyperlipidemia, Muscle strain of right gluteal region (5/20/2019), Nephrolithiasis (2006), Normocytic hypochromic anemia (5/20/2017), NSTEMI (non-ST elevated myocardial infarction) ([facility]) (07/18/2017), Pain (06/30/2021), Peripheral vascular disease ([facility]), Polyneuropathy in diabetes(357.2) (9/11/2013), Renal cyst (1/29/2020), Renal mass (1/21/2020), Septic shock ([facility]) (5/20/2017), Skin ulcer of calf ([facility]) (2015), Stroke ([facility]) (2016), Urinary bladder disorder, Urinary incontinence, Weakness, and Wound of left leg (2012).    SOCIAL HISTORY  Social History     Tobacco Use   • Smoking status: Never Smoker   • Smokeless tobacco: Never Used   Vaping Use   • Vaping Use: Never used   Substance and Sexual Activity   • Alcohol use: Never   • Drug use: Never   • Sexual activity: Not Currently     Partners: Female     Comment: , one daughter, 2 grands     Social History     Substance and Sexual Activity   Drug Use Never       SURGICAL HISTORY   has a past surgical history that includes lithotripsy; angioplasty (1997); wound closure general (4/3/2012); tonsillectomy and adenoidectomy; cataract extraction with iol; solo by laparoscopy (1998); cystoscopy stent placement (Left, 2/12/2018); ureteroscopy (Left, 2/12/2018); lasertripsy (Left, 2/12/2018); [facility] cardiac cath (1997); [facility] cardiac cath (2009); tonsillectomy; lumbar transforaminal epidural steroid injection (Right, 11/2/2020);  lumbar transforaminal epidural steroid injection (Right, 3/29/2021); upper gi endoscopy,diagnosis (N/A, 7/21/2021); upper gi endoscopy,biopsy (N/A, 7/21/2021); and inj lumbar/sacral,w/ imaging (7/27/2021).    CURRENT MEDICATIONS  Home Medications     Reviewed by Lo Price R.N. (Registered Nurse) on 05/29/22 at 1150  Med List Status: Complete   Medication Last Dose Status   acetaminophen (TYLENOL) 500 MG Tab  Active   alfuzosin (UROXATRAL) 10 MG SR tablet  Active   allopurinol (ZYLOPRIM) 100 MG Tab  Active   atorvastatin (LIPITOR) 80 MG tablet  Active   Cholecalciferol (D3) 2000 UNIT Tab  Active   clopidogrel (PLAVIX) 75 MG Tab  Active   Dulaglutide (TRULICITY) 3 MG/0.5ML Solution Pen-injector  Active   fenofibrate (TRICOR) 145 MG Tab  Active   fluoxetine (PROZAC) 40 MG capsule  Active   gabapentin (NEURONTIN) 100 MG Cap  Active   glucose blood strip  Active   Insulin Glargine, 1 Unit Dial, (TOUJEO SOLOSTAR) 300 UNIT/ML Solution Pen-injector  Active   insulin lispro (HUMALOG,ADMELOG) 100 UNIT/ML Solution Pen-injector injection PEN  Active   Insulin Pen Needle (PEN NEEDLES) 32G X 4 MM Misc  Active   Insulin Pen Needle 32 G x 4 mm  Active   losartan (COZAAR) 50 MG Tab  Active   magnesium oxide (MAG-OX) 400 MG Tab  Active   methenamine hip (HIPPREX) 1 GM Tab  Active   metoprolol SR (TOPROL XL) 100 MG TABLET SR 24 HR  Active   Multiple Vitamin (MULTI VITAMIN MENS PO)  Active   omeprazole (PRILOSEC) 20 MG delayed-release capsule  Active   ondansetron (ZOFRAN ODT) 4 MG TABLET DISPERSIBLE  Active   potassium chloride (KLOR-CON) 8 MEQ tablet  Active   saccharomyces boulardii (FLORASTOR) 250 MG Cap  Active   sulfamethoxazole-trimethoprim (BACTRIM DS) 800-160 MG tablet  Active                ALLERGIES  Allergies   Allergen Reactions   • Diphenhydramine Anxiety     Pt is able to tolerate  Mg benadryl with less anxiety   • Diphenhydramine Hcl Anxiety     Pt is able to tolerate  Mg benadryl with less anxiety   •  Lorazepam Unspecified     Disorientation   • Ciprofloxacin      Rash,stomach ache   • Spironolactone      Acute kidney injury       PHYSICAL EXAM  VITAL SIGNS: /84   Pulse 73   Temp 36.3 °C (97.4 °F) (Oral)   Resp 17   Ht 1.829 m (6')   Wt 90 kg (198 lb 6.6 oz)   SpO2 95%   BMI 26.91 kg/m²   Pulse ox interpretation: I interpret this pulse ox as normal.  Constitutional: Alert in no apparent distress.  Pale, chronically ill-appearing.  Debilitated.  HENT: No signs of trauma, Bilateral external ears normal, Nose normal.   Eyes: Conjunctiva normal, Non-icteric.   Neck: Normal range of motion, Supple, No stridor.   Lymphatic: No lymphadenopathy noted.   Cardiovascular: Regular rate and rhythm, no murmurs.   Thorax & Lungs: Normal breath sounds, No respiratory distress, No wheezing, No chest tenderness.   Abdomen: Bowel sounds normal, Soft, No tenderness, No masses, No pulsatile masses. No peritoneal signs.  Skin: Warm, Dry, No erythema, No rash.   Extremities: Intact distal pulses, No edema, No cyanosis.  Musculoskeletal: Chronic contractures of left upper extremity, otherwise good range of motion in all major joints. No or major deformities noted.   Neurologic: Alert , Normal motor function, Normal sensory function, No focal deficits noted.   Psychiatric: Affect normal, Judgment normal, Mood normal.     DIAGNOSTIC STUDIES / PROCEDURES      LABS  Labs Reviewed   CBC WITH DIFFERENTIAL - Abnormal; Notable for the following components:       Result Value    WBC 17.1 (*)     RBC 4.40 (*)     Hemoglobin 12.7 (*)     Hematocrit 38.7 (*)     MCHC 32.8 (*)     Neutrophils-Polys 87.70 (*)     Lymphocytes 3.50 (*)     Immature Granulocytes 1.70 (*)     Neutrophils (Absolute) 14.98 (*)     Lymphs (Absolute) 0.59 (*)     Monos (Absolute) 0.89 (*)     Immature Granulocytes (abs) 0.29 (*)     All other components within normal limits   BASIC METABOLIC PANEL - Abnormal; Notable for the following components:    Sodium 131  "(*)     Glucose 125 (*)     Bun 28 (*)     Creatinine 1.63 (*)     All other components within normal limits   URINALYSIS - Abnormal; Notable for the following components:    Character Sl Cloudy (*)     Leukocyte Esterase Large (*)     Occult Blood Large (*)     All other components within normal limits   URINE MICROSCOPIC (W/UA) - Abnormal; Notable for the following components:    -150 (*)     RBC 5-10 (*)     Bacteria Moderate (*)     Yeast Cells Few (*)     Hyphae Yeast Present (*)     All other components within normal limits   ESTIMATED GFR - Abnormal; Notable for the following components:    GFR (CKD-EPI) 44 (*)     All other components within normal limits   CBC WITHOUT DIFFERENTIAL - Abnormal; Notable for the following components:    WBC 15.4 (*)     RBC 3.98 (*)     Hemoglobin 11.4 (*)     Hematocrit 34.8 (*)     MCHC 32.8 (*)     All other components within normal limits    Narrative:     Special Contact Isolation   COMP METABOLIC PANEL - Abnormal; Notable for the following components:    Sodium 132 (*)     Co2 18 (*)     Bun 26 (*)     Creatinine 1.52 (*)     Alkaline Phosphatase 102 (*)     Albumin 2.5 (*)     Total Protein 5.1 (*)     All other components within normal limits    Narrative:     Special Contact Isolation   MAGNESIUM - Abnormal; Notable for the following components:    Magnesium 1.3 (*)     All other components within normal limits    Narrative:     Special Contact Isolation   ESTIMATED GFR - Abnormal; Notable for the following components:    GFR (CKD-EPI) 47 (*)     All other components within normal limits    Narrative:     Special Contact Isolation   BLOOD CULTURE    Narrative:     1 of 2 for Blood Culture x 2 sites order. Per Hospital  Policy: Only change Specimen Src: to \"Line\" if specified by  physician order.  Right Forearm/Arm   BLOOD CULTURE    Narrative:     2 of 2 blood culture x2  Sites order. Per Hospital Policy:  Only change Specimen Src: to \"Line\" if specified by " physician  order.  Right Hand   LACTIC ACID   LACTIC ACID   LACTIC ACID   CDIFF BY PCR RFLX TOXIN    Narrative:     Special Contact Isolation  Does this patient have risk factors for C-diff?->Yes  C-Diff Risk Factors->antibiotic exposure   POCT GLUCOSE DEVICE RESULTS   POCT GLUCOSE DEVICE RESULTS   POCT GLUCOSE DEVICE RESULTS   POCT GLUCOSE DEVICE RESULTS     All labs reviewed by me.    RADIOLOGY  DX-CHEST-PORTABLE (1 VIEW)   Final Result      No acute cardiac or pulmonary abnormalities are identified.        The radiologist's interpretation of all radiological studies have been reviewed by me.    COURSE & MEDICAL DECISION MAKING  Nursing notes, VS, PMSFHx reviewed in chart.     2:25 PM Patient seen and examined at bedside. Differential diagnosis includes but is not limited to debility in the setting of acute infection, dehydration, electrolyte abnormality, anemia. Ordered for CBC, BMP, urinalysis, chest x-ray to evaluate. Patient will be treated with normal saline bolus for his symptoms.     3:43 PM labs have come back with, so far, elevated immature granulocytes, and a leukocytosis of 17.  In the setting of a known recent urinary tract infection, this is concerning for outpatient treatment failure.  I have added blood cultures, antibiotics, lactic acid.    3:48 PM chest x-ray is negative.  He urine is still running, but seems the most likely source, given documented recent infection.    4:21 PM UA is strongly positive. PT has had unasyn at pharmacy's recommendation. Dr. Chun will admit.    DISPOSITION:  Patient will be admitted to Dr. Chun in stable condition.      FINAL IMPRESSION  1. Acute urinary tract infection  2. Generalized weakness

## 2022-05-29 LAB
ALBUMIN SERPL BCP-MCNC: 2.5 G/DL (ref 3.2–4.9)
ALBUMIN/GLOB SERPL: 1 G/DL
ALP SERPL-CCNC: 102 U/L (ref 30–99)
ALT SERPL-CCNC: 13 U/L (ref 2–50)
ANION GAP SERPL CALC-SCNC: 11 MMOL/L (ref 7–16)
AST SERPL-CCNC: 18 U/L (ref 12–45)
BILIRUB SERPL-MCNC: 0.6 MG/DL (ref 0.1–1.5)
BUN SERPL-MCNC: 26 MG/DL (ref 8–22)
C DIFF DNA SPEC QL NAA+PROBE: NEGATIVE
C DIFF TOX GENS STL QL NAA+PROBE: NEGATIVE
CALCIUM SERPL-MCNC: 8.5 MG/DL (ref 8.4–10.2)
CHLORIDE SERPL-SCNC: 103 MMOL/L (ref 96–112)
CO2 SERPL-SCNC: 18 MMOL/L (ref 20–33)
CREAT SERPL-MCNC: 1.52 MG/DL (ref 0.5–1.4)
ERYTHROCYTE [DISTWIDTH] IN BLOOD BY AUTOMATED COUNT: 44.2 FL (ref 35.9–50)
GFR SERPLBLD CREATININE-BSD FMLA CKD-EPI: 47 ML/MIN/1.73 M 2
GLOBULIN SER CALC-MCNC: 2.6 G/DL (ref 1.9–3.5)
GLUCOSE BLD STRIP.AUTO-MCNC: 135 MG/DL (ref 65–99)
GLUCOSE BLD STRIP.AUTO-MCNC: 135 MG/DL (ref 65–99)
GLUCOSE BLD STRIP.AUTO-MCNC: 74 MG/DL (ref 65–99)
GLUCOSE BLD STRIP.AUTO-MCNC: 83 MG/DL (ref 65–99)
GLUCOSE SERPL-MCNC: 79 MG/DL (ref 65–99)
HCT VFR BLD AUTO: 34.8 % (ref 42–52)
HGB BLD-MCNC: 11.4 G/DL (ref 14–18)
MAGNESIUM SERPL-MCNC: 1.3 MG/DL (ref 1.5–2.5)
MCH RBC QN AUTO: 28.6 PG (ref 27–33)
MCHC RBC AUTO-ENTMCNC: 32.8 G/DL (ref 33.7–35.3)
MCV RBC AUTO: 87.4 FL (ref 81.4–97.8)
PLATELET # BLD AUTO: 341 K/UL (ref 164–446)
PMV BLD AUTO: 10 FL (ref 9–12.9)
POTASSIUM SERPL-SCNC: 3.9 MMOL/L (ref 3.6–5.5)
PROT SERPL-MCNC: 5.1 G/DL (ref 6–8.2)
RBC # BLD AUTO: 3.98 M/UL (ref 4.7–6.1)
SODIUM SERPL-SCNC: 132 MMOL/L (ref 135–145)
WBC # BLD AUTO: 15.4 K/UL (ref 4.8–10.8)

## 2022-05-29 PROCEDURE — 85027 COMPLETE CBC AUTOMATED: CPT

## 2022-05-29 PROCEDURE — 80053 COMPREHEN METABOLIC PANEL: CPT

## 2022-05-29 PROCEDURE — 87493 C DIFF AMPLIFIED PROBE: CPT

## 2022-05-29 PROCEDURE — 700111 HCHG RX REV CODE 636 W/ 250 OVERRIDE (IP): Performed by: INTERNAL MEDICINE

## 2022-05-29 PROCEDURE — 700111 HCHG RX REV CODE 636 W/ 250 OVERRIDE (IP): Performed by: HOSPITALIST

## 2022-05-29 PROCEDURE — 700105 HCHG RX REV CODE 258: Performed by: HOSPITALIST

## 2022-05-29 PROCEDURE — A9270 NON-COVERED ITEM OR SERVICE: HCPCS | Performed by: HOSPITALIST

## 2022-05-29 PROCEDURE — 82962 GLUCOSE BLOOD TEST: CPT | Mod: 91

## 2022-05-29 PROCEDURE — 83735 ASSAY OF MAGNESIUM: CPT

## 2022-05-29 PROCEDURE — 99233 SBSQ HOSP IP/OBS HIGH 50: CPT | Performed by: INTERNAL MEDICINE

## 2022-05-29 PROCEDURE — 36415 COLL VENOUS BLD VENIPUNCTURE: CPT

## 2022-05-29 PROCEDURE — 770006 HCHG ROOM/CARE - MED/SURG/GYN SEMI*

## 2022-05-29 PROCEDURE — 700105 HCHG RX REV CODE 258: Performed by: INTERNAL MEDICINE

## 2022-05-29 PROCEDURE — 700102 HCHG RX REV CODE 250 W/ 637 OVERRIDE(OP): Performed by: HOSPITALIST

## 2022-05-29 RX ORDER — CHLORHEXIDINE GLUCONATE 4 G/100ML
SOLUTION TOPICAL
Qty: 240 ML | Refills: 0 | OUTPATIENT
Start: 2022-05-29

## 2022-05-29 RX ORDER — SODIUM CHLORIDE, SODIUM LACTATE, POTASSIUM CHLORIDE, AND CALCIUM CHLORIDE .6; .31; .03; .02 G/100ML; G/100ML; G/100ML; G/100ML
30 INJECTION, SOLUTION INTRAVENOUS
Status: DISCONTINUED | OUTPATIENT
Start: 2022-05-29 | End: 2022-05-31 | Stop reason: HOSPADM

## 2022-05-29 RX ORDER — MAGNESIUM SULFATE HEPTAHYDRATE 40 MG/ML
2 INJECTION, SOLUTION INTRAVENOUS ONCE
Status: COMPLETED | OUTPATIENT
Start: 2022-05-29 | End: 2022-05-29

## 2022-05-29 RX ADMIN — PIPERACILLIN AND TAZOBACTAM 3.38 G: 3; .375 INJECTION, POWDER, LYOPHILIZED, FOR SOLUTION INTRAVENOUS; PARENTERAL at 06:07

## 2022-05-29 RX ADMIN — RIVAROXABAN 10 MG: 10 TABLET, FILM COATED ORAL at 17:45

## 2022-05-29 RX ADMIN — ALLOPURINOL 100 MG: 100 TABLET ORAL at 06:12

## 2022-05-29 RX ADMIN — CLOPIDOGREL BISULFATE 75 MG: 75 TABLET ORAL at 06:12

## 2022-05-29 RX ADMIN — MAGNESIUM SULFATE 2 G: 2 INJECTION INTRAVENOUS at 08:55

## 2022-05-29 RX ADMIN — CEFAZOLIN 2 G: 2 INJECTION, POWDER, FOR SOLUTION INTRAMUSCULAR; INTRAVENOUS at 17:43

## 2022-05-29 RX ADMIN — Medication 400 MG: at 06:12

## 2022-05-29 RX ADMIN — LOSARTAN POTASSIUM 50 MG: 25 TABLET, FILM COATED ORAL at 06:12

## 2022-05-29 RX ADMIN — ATORVASTATIN CALCIUM 80 MG: 40 TABLET, FILM COATED ORAL at 17:46

## 2022-05-29 RX ADMIN — INSULIN GLARGINE-YFGN 11 UNITS: 100 INJECTION, SOLUTION SUBCUTANEOUS at 20:59

## 2022-05-29 RX ADMIN — PIPERACILLIN AND TAZOBACTAM 3.38 G: 3; .375 INJECTION, POWDER, LYOPHILIZED, FOR SOLUTION INTRAVENOUS; PARENTERAL at 12:55

## 2022-05-29 RX ADMIN — TAMSULOSIN HYDROCHLORIDE 0.4 MG: 0.4 CAPSULE ORAL at 06:12

## 2022-05-29 RX ADMIN — FENOFIBRATE 134 MG: 134 CAPSULE ORAL at 17:45

## 2022-05-29 RX ADMIN — FLUOXETINE 40 MG: 20 CAPSULE ORAL at 06:12

## 2022-05-29 ASSESSMENT — COGNITIVE AND FUNCTIONAL STATUS - GENERAL
EATING MEALS: A LITTLE
TURNING FROM BACK TO SIDE WHILE IN FLAT BAD: A LITTLE
SUGGESTED CMS G CODE MODIFIER DAILY ACTIVITY: CL
SUGGESTED CMS G CODE MODIFIER MOBILITY: CL
DAILY ACTIVITIY SCORE: 13
MOBILITY SCORE: 13
MOVING TO AND FROM BED TO CHAIR: A LOT
DRESSING REGULAR LOWER BODY CLOTHING: A LOT
DRESSING REGULAR UPPER BODY CLOTHING: A LOT
TOILETING: A LOT
CLIMB 3 TO 5 STEPS WITH RAILING: TOTAL
HELP NEEDED FOR BATHING: A LOT
PERSONAL GROOMING: A LOT
WALKING IN HOSPITAL ROOM: A LITTLE
STANDING UP FROM CHAIR USING ARMS: A LOT
MOVING FROM LYING ON BACK TO SITTING ON SIDE OF FLAT BED: A LOT

## 2022-05-29 ASSESSMENT — PAIN DESCRIPTION - PAIN TYPE
TYPE: CHRONIC PAIN;ACUTE PAIN
TYPE: CHRONIC PAIN;ACUTE PAIN
TYPE: ACUTE PAIN;CHRONIC PAIN

## 2022-05-29 ASSESSMENT — LIFESTYLE VARIABLES
CONSUMPTION TOTAL: NEGATIVE
AVERAGE NUMBER OF DAYS PER WEEK YOU HAVE A DRINK CONTAINING ALCOHOL: 0
HOW MANY TIMES IN THE PAST YEAR HAVE YOU HAD 5 OR MORE DRINKS IN A DAY: 0
TOTAL SCORE: 0
TOTAL SCORE: 0
HAVE YOU EVER FELT YOU SHOULD CUT DOWN ON YOUR DRINKING: NO
ON A TYPICAL DAY WHEN YOU DRINK ALCOHOL HOW MANY DRINKS DO YOU HAVE: 0
TOTAL SCORE: 0
EVER HAD A DRINK FIRST THING IN THE MORNING TO STEADY YOUR NERVES TO GET RID OF A HANGOVER: NO
EVER FELT BAD OR GUILTY ABOUT YOUR DRINKING: NO
HAVE PEOPLE ANNOYED YOU BY CRITICIZING YOUR DRINKING: NO
ALCOHOL_USE: NO

## 2022-05-29 ASSESSMENT — ENCOUNTER SYMPTOMS
PALPITATIONS: 0
CONSTIPATION: 0
CHILLS: 0
FEVER: 0
HEADACHES: 0
NAUSEA: 0
SHORTNESS OF BREATH: 0
DIZZINESS: 0
COUGH: 0
VOMITING: 0
ABDOMINAL PAIN: 1
DIARRHEA: 1
WEAKNESS: 1
BACK PAIN: 0

## 2022-05-29 NOTE — CARE PLAN
The patient is Stable - Low risk of patient condition declining or worsening    Shift Goals  Clinical Goals: Blood sugar WDL this shift  Patient Goals: Able to tolerate food intake, IV ABX    Progress made toward(s) clinical / shift goals:  PRN medication not given for pain control. ABX given. Tolerating food intake. Blood glucose latest 135.    Patient is not progressing towards the following goals:      Problem: Fall Risk  Goal: Patient will remain free from falls  Outcome: Progressing     Problem: Pain - Standard  Goal: Alleviation of pain or a reduction in pain to the patient’s comfort goal  Outcome: Progressing     Problem: Diabetes Management  Goal: Patient will achieve and maintain glucose in satisfactory range  Outcome: Progressing

## 2022-05-29 NOTE — DISCHARGE PLANNING
Met pt and wife in room 2216. Wife is caregiver to pt. He has Hx CVA due to which he has left sided weakness. Pt is primarily wheelchair bound, wife said that pt has a pole on the side of the bed that he holds on to for transfers. Pt is able to pivot for transfer to car, chair to wheelchair. Wife assist pt with showers and dressing. Pt can feed himself but wife has to cut meet. Wife performs all IADLs for pt. Pt has an exercise therapist that he sees twice a week for strengthening. He had homehealth before and they do not think they want home health again as they only come once a week and they do not think it will matter to help their routine.     Verified information on facesheet. PCP Madison Ortiz. Pharmacy is [facility].     Care Transition Team Assessment    Information Source  Orientation Level: Oriented X4  Information Given By: Patient, Spouse  Informant's Name: Usha Mast  Who is responsible for making decisions for patient? : Patient    Readmission Evaluation  Is this a readmission?: No    Elopement Risk  Legal Hold: No  Ambulatory or Self Mobile in Wheelchair: No-Not an Elopement Risk    Interdisciplinary Discharge Planning  Does Admitting Nurse Feel This Could be a Complex Discharge?: No  Primary Care Physician: Dr. Madison Bunch  Lives with - Patient's Self Care Capacity: Significant Other  Patient or legal guardian wants to designate a caregiver: Yes  Caregiver name: Usha Parr  Caregiver contact info: [phone]  Support Systems: Spouse / Significant Other  Housing / Facility: [address]  Do You Take your Prescribed Medications Regularly: Yes  Able to Return to Previous ADL's: Yes  Mobility Issues: Yes  Prior Services: Intermittent Physical Support for ADL Per Family  Patient Prefers to be Discharged to:: Home  Assistance Needed: Yes  Durable Medical Equipment: Walker    Discharge Preparedness  What is your plan after discharge?: Home with help  What are your discharge  supports?: Spouse  Prior Functional Level: Needs Assist with Activities of Daily Living, Needs Assist with Medication Management, Wheelchair Dependent  Difficulity with ADLs: Bathing, Dressing, Toileting, Walking  Difficulity with IADLs: Cooking, Driving, Laundry, Shopping, Managing medication    Functional Assesment  Prior Functional Level: Needs Assist with Activities of Daily Living, Needs Assist with Medication Management, Wheelchair Dependent    Finances  Financial Barriers to Discharge: No  Prescription Coverage: Yes  Prescription Coverage Comments:  ([facility])    Vision / Hearing Impairment  Vision Impairment : Yes  Right Eye Vision: Wears Glasses  Left Eye Vision: Wears Glasses  Hearing Impairment : No    Advance Directive  Advance Directive?: Living Will, POLST    Domestic Abuse  Have you ever been the victim of abuse or violence?: No    Discharge Risks or Barriers  Discharge risks or barriers?: No  Patient risk factors: Vulnerable adult    Anticipated Discharge Information  Discharge Disposition: D/T to home under A care in anticipation of covered skilled care (06)

## 2022-05-29 NOTE — PROGRESS NOTES
1800-Received report from ER RN.  1819-Pt arrived to floor via gurney, then transferred to bed via slide board, alert and awake, no signs of distress.  IVF infusing  FSBG-92 mg/dl  Fall precautions in place, treaded socks on pt, bed in low position.   Call light within reach.   Educated pt to call if needing anything. 1900-Report given to night shift RN.

## 2022-05-29 NOTE — ASSESSMENT & PLAN NOTE
Hypovolemic hyponatremia, 131 on admission  I expect Na to improve with IVF  Improved to 135. Recheck labs

## 2022-05-29 NOTE — CARE PLAN
The patient is Stable - Low risk of patient condition declining or worsening    Shift Goals  Clinical Goals:  (IV ABX)  Patient Goals:  (BLOOD SUGAR MONITORING,)    Progress made toward(s) clinical / shift goals:   PT  continue to  receive iv abx, monitoring patient for s/s of low sugar.    Patient is not progressing towards the following goals:

## 2022-05-29 NOTE — HOSPITAL COURSE
77-year-old male past medical history hypertension, dyslipidemia, gout, T2DM, recurrent UTI's admitted/20/22 for his weakness and diarrhea.  Is associated worsening weakness, intermittent chills.  Is admitted with evidence of sepsis including leukocytosis 17,000, respiratory rate 24 positive for UTI and yeast.      As per patient's wife, Mr. Corea had acute stroke in the [place] area around 2016.  She stated he was doing well after the stroke and was able to recover function. Patient was able to still walk.  Shortly after he was diagnosed with non-Hodgkin's lymphoma.  Patient [place].  At some time patient suffered septic shock and was treated in the ICU in [place]. She reports he had Norovirus at that time.  It was after that time the patient began to show physical deterioration and debility.  He had ongoing infections subsequently and as per wife he never regained his previous physical abilities.      Patient has required urinary catheterization at home, was more frequent but currently now down to 1/day.  As per wife she makes attempts to keep them clean during the catheterization, uses soap and water.  Gloves and sterile lubricant.  Patient has followed with urology [place] with Dr. Barry.  Patient had recent TURP procedure for urinary retention.      He has had ongoing UTI since 2020.  Staph aureus pansensitive - 5/24/22 and 03/05/2021  Klebsiella pneumoniae pansensitive - 3/3/22, 6/25/20, 5/14/20   Proteus mirabilis - resistant to minocycline, nitrofurantoin - 11/20/2021 through 01/22/22

## 2022-05-29 NOTE — PROGRESS NOTES
4 Eyes Skin Assessment Completed by LETICIA Naqvi and LETICIA Varela.    Head WDL  Ears WDL  Nose WDL  Mouth WDL  Neck WDL  Breast/Chest WDL  Shoulder Blades WDL  Spine WDL  (R) Arm/Elbow/Hand Bruising  (L) Arm/Elbow/Hand Bruising and Scab  Abdomen Bruising  Groin WDL  Scrotum/Coccyx/Buttocks Redness, Blanching and Scab  (R) Leg Scab, Bruising and Abrasion  (L) Leg Scab and Abrasion  (R) Heel/Foot/Toe WDL  (L) Heel/Foot/Toe WDL          Devices In Places Blood Pressure Cuff and Pulse Ox      Interventions In Place Waffle Overlay and Pillows    Possible Skin Injury No    Pictures Uploaded Into Epic N/A  Wound Consult Placed N/A  RN Wound Prevention Protocol Ordered No

## 2022-05-29 NOTE — PROGRESS NOTES
Received bedside report.  Assumed pt care.   Assessment and chart check complete.  Pt is [id number], no signs of distress, denies nausea/vomiting  IVF infusing.  Waffle mattress overlay in place.  ABX given.  1100-FSBG 74 mg/dl. Orange juice given.   Fall precautions in place, treaded socks on pt, bed in low position.   Call light within reach.   Educated pt to call if needing anything.   Hourly rounding.

## 2022-05-29 NOTE — ASSESSMENT & PLAN NOTE
This is Sepsis Present on admission  SIRS criteria identified on my evaluation include: Tachycardia, with heart rate greater than 90 BPM and Leukocytosis, with WBC greater than 12,000  Source is recurrent bacterial UTI  Sepsis protocol initiated  Fluid resuscitation ordered per protocol  Crystalloid Fluid Administration: Fluid resuscitation ordered per standard protocol - 30 mL/kg per current or ideal body weight  IV antibiotics as appropriate for source of sepsis - Zosyn  Reassessment: I have reassessed the patient's hemodynamic status  Lactic acid remained < 2

## 2022-05-29 NOTE — ASSESSMENT & PLAN NOTE
Likely secondary to reduced oral intake and increase insensory losses  Encourage oral intake as tolerated, antiemetics as needed.  Gentle intravenous hydration until adequate oral intake is achieved

## 2022-05-29 NOTE — PROGRESS NOTES
[facility] Medicine Daily Progress Note    Date of Service  5/29/2022    Chief Complaint  Av Bob is a 77 y.o. male admitted 5/28/2022 with  Chief Complaint   Patient presents with   • Weakness     Weakness. Taking antibiotics for UTI. Unable to eat.     Hospital Course  77-year-old male past medical history hypertension, dyslipidemia, gout, T2DM, recurrent UTI's admitted/20/22 for his weakness and diarrhea.  Is associated worsening weakness, intermittent chills.  Is admitted with evidence of sepsis including leukocytosis 17,000, respiratory rate 24 positive for UTI and yeast.      As per patient's wife, Mr. Corea had acute stroke in the [place] area around 2016.  She stated he was doing well after the stroke and was able to recover function. Patient was able to still walk.  Shortly after he was diagnosed with non-Hodgkin's lymphoma.  Patient was tolerating [place] in the [place].  At some time patient suffered septic shock and was treated in the ICU in [place]. She reports he had Norovirus at that time.  It was after that time the patient began to show physical deterioration and debility.  He had ongoing infections subsequently and as per wife he never regained his previous physical abilities.      Patient has required urinary catheterization at home, was more frequent but currently now down to 1/day.  As per wife she makes attempts to keep them clean during the catheterization, uses soap and water.  Gloves and sterile lubricant.  Patient has followed with urology [place] with Dr. Barry.  Patient had recent TURP procedure for urinary retention.      He has had ongoing UTI since 2020.  Staph aureus pansensitive - 5/24/22 and 03/05/2021  Klebsiella pneumoniae pansensitive - 3/3/22, 6/25/20, 5/14/20   Proteus mirabilis - resistant to minocycline, nitrofurantoin - 11/20/2021 through 01/22/22    Interval Problem Update  Patient stated he could not explain his diarrhea, difficult  to remember. Wife was present and helped answer questions. Information about patient's history given, written above in hospital course.    I have personally seen and examined the patient at bedside. I discussed the plan of care with patient, family, bedside RN, charge RN,  and pharmacy.    Consultants/Specialty  none    Code Status  Full Code    Disposition  Patient is not medically cleared for discharge.   Anticipate discharge to to home with close outpatient follow-up.  I have placed the appropriate orders for post-discharge needs.    Review of Systems  Review of Systems   Constitutional: Positive for malaise/fatigue. Negative for chills and fever.   Respiratory: Negative for cough and shortness of breath.    Cardiovascular: Negative for chest pain and palpitations.   Gastrointestinal: Positive for abdominal pain and diarrhea. Negative for constipation, nausea and vomiting.   Genitourinary: Positive for frequency.   Musculoskeletal: Negative for back pain and joint pain.   Neurological: Positive for weakness. Negative for dizziness and headaches.   All other systems reviewed and are negative.       Physical Exam  Temp:  [36.3 °C (97.4 °F)-36.8 °C (98.3 °F)] 36.3 °C (97.4 °F)  Pulse:  [65-76] 73  Resp:  [16-24] 17  BP: ()/(60-84) 128/84  SpO2:  [95 %-98 %] 95 %    Physical Exam  Vitals and nursing note reviewed.   Constitutional:       General: He is not in acute distress.     Comments: Frail, thin appearing elderly male   HENT:      Mouth/Throat:      Mouth: Mucous membranes are dry.      Pharynx: No oropharyngeal exudate.   Eyes:      General: No scleral icterus.     Extraocular Movements: Extraocular movements intact.   Cardiovascular:      Rate and Rhythm: Normal rate and regular rhythm.      Pulses: Normal pulses.      Heart sounds: Normal heart sounds. No murmur heard.  Pulmonary:      Effort: Pulmonary effort is normal. No respiratory distress.      Breath sounds: Normal breath sounds.    Abdominal:      General: Abdomen is flat. Bowel sounds are normal. There is no distension.      Palpations: Abdomen is soft.   Genitourinary:     Comments: Michaels in place  Musculoskeletal:         General: No swelling.      Cervical back: Normal range of motion and neck supple.      Comments: Sarcopenic   Skin:     General: Skin is warm.      Capillary Refill: Capillary refill takes less than 2 seconds.      Coloration: Skin is not jaundiced.   Neurological:      General: No focal deficit present.      Mental Status: He is alert and oriented to person, place, and time. Mental status is at baseline.      Motor: Weakness present.   Psychiatric:         Mood and Affect: Mood normal.         Behavior: Behavior normal.         Fluids    Intake/Output Summary (Last 24 hours) at 5/29/2022 1615  Last data filed at 5/29/2022 1200  Gross per 24 hour   Intake 120 ml   Output --   Net 120 ml       Laboratory  Recent Labs     05/28/22  1506 05/29/22  0136   WBC 17.1* 15.4*   RBC 4.40* 3.98*   HEMOGLOBIN 12.7* 11.4*   HEMATOCRIT 38.7* 34.8*   MCV 88.0 87.4   MCH 28.9 28.6   MCHC 32.8* 32.8*   RDW 45.3 44.2   PLATELETCT 350 341   MPV 10.0 10.0     Recent Labs     05/28/22  1506 05/29/22  0136   SODIUM 131* 132*   POTASSIUM 4.2 3.9   CHLORIDE 98 103   CO2 20 18*   GLUCOSE 125* 79   BUN 28* 26*   CREATININE 1.63* 1.52*   CALCIUM 8.8 8.5                   Imaging  DX-CHEST-PORTABLE (1 VIEW)   Final Result      No acute cardiac or pulmonary abnormalities are identified.           Assessment/Plan  Hyponatremia- (present on admission)  Assessment & Plan  Hypovolemic hyponatremia, 131 on admission  I expect Na to improve with IVF    Dehydration- (present on admission)  Assessment & Plan  Likely secondary to reduced oral intake and increase insensory losses  Encourage oral intake as tolerated, antiemetics as needed.  Gentle intravenous hydration until adequate oral intake is achieved    Essential hypertension, benign- (present on  admission)  Assessment & Plan  Resume home losartan and metoprolol with hold parameters    Sepsis without acute organ dysfunction (HCC)- (present on admission)  Assessment & Plan  This is Sepsis Present on admission  SIRS criteria identified on my evaluation include: Tachycardia, with heart rate greater than 90 BPM and Leukocytosis, with WBC greater than 12,000  Source is recurrent bacterial UTI  Sepsis protocol initiated  Fluid resuscitation ordered per protocol  Crystalloid Fluid Administration: Fluid resuscitation ordered per standard protocol - 30 mL/kg per current or ideal body weight  IV antibiotics as appropriate for source of sepsis - Zosyn  Reassessment: I have reassessed the patient's hemodynamic status  Lactic acid remained < 2    Diarrhea- (present on admission)  Assessment & Plan  Likely secondary to antibiotics.  Ruled out C. difficile colitis    GERD with esophagitis- (present on admission)  Assessment & Plan  Resume home omeprazole    Recurrent UTI- (present on admission)  Assessment & Plan  Currently on antibiotics, latest cultures showing staph aureus and Klebsiella pneumonia  He has had ongoing UTI since 2020.  Staph aureus pansensitive - 5/24/22 and 03/05/2021  Klebsiella pneumoniae pansensitive - 3/3/22, 6/25/20, 5/14/20   Proteus mirabilis - resistant to minocycline, nitrofurantoin - 11/20/2021 through 01/22/22    Started on Zosyn in the emergency room, continue for now    Hypomagnesemia- (present on admission)  Assessment & Plan  Due to diarrhea  Severely low 1.3. replacing aggressively with IV mag sulfate 2g    Type 2 diabetes mellitus, with long-term current use of insulin (HCC)- (present on admission)  Assessment & Plan  With hyperglycemia  Last glycated hemoglobin was 6.6 %  Long & short acting insulin  Accu-Checks, hypoglycemia protocol     Stage 3b chronic kidney disease (HCC)- (present on admission)  Assessment & Plan  Avoid/minimize nephrotoxins as much as possible, renally dose  medications, monitor inputs and outputs     Idiopathic chronic gout of multiple sites without tophus- (present on admission)  Assessment & Plan  Resume home allopurinol       VTE prophylaxis: Xarelto 10 mg daily as prophylaxis    I have performed a physical exam and reviewed and updated ROS and Plan today (5/29/2022). In review of yesterday's note (5/28/2022), there are no changes except as documented above.

## 2022-05-30 LAB
ALBUMIN SERPL BCP-MCNC: 2.5 G/DL (ref 3.2–4.9)
BUN SERPL-MCNC: 22 MG/DL (ref 8–22)
CALCIUM SERPL-MCNC: 8.5 MG/DL (ref 8.4–10.2)
CHLORIDE SERPL-SCNC: 103 MMOL/L (ref 96–112)
CK SERPL-CCNC: 33 U/L (ref 0–154)
CO2 SERPL-SCNC: 19 MMOL/L (ref 20–33)
CREAT SERPL-MCNC: 1.49 MG/DL (ref 0.5–1.4)
ERYTHROCYTE [DISTWIDTH] IN BLOOD BY AUTOMATED COUNT: 44.7 FL (ref 35.9–50)
GFR SERPLBLD CREATININE-BSD FMLA CKD-EPI: 49 ML/MIN/1.73 M 2
GLUCOSE BLD STRIP.AUTO-MCNC: 113 MG/DL (ref 65–99)
GLUCOSE BLD STRIP.AUTO-MCNC: 119 MG/DL (ref 65–99)
GLUCOSE BLD STRIP.AUTO-MCNC: 144 MG/DL (ref 65–99)
GLUCOSE BLD STRIP.AUTO-MCNC: 155 MG/DL (ref 65–99)
GLUCOSE SERPL-MCNC: 105 MG/DL (ref 65–99)
HCT VFR BLD AUTO: 36.5 % (ref 42–52)
HGB BLD-MCNC: 12 G/DL (ref 14–18)
MAGNESIUM SERPL-MCNC: 1.5 MG/DL (ref 1.5–2.5)
MCH RBC QN AUTO: 28.6 PG (ref 27–33)
MCHC RBC AUTO-ENTMCNC: 32.9 G/DL (ref 33.7–35.3)
MCV RBC AUTO: 87.1 FL (ref 81.4–97.8)
PHOSPHATE SERPL-MCNC: 2.6 MG/DL (ref 2.5–4.5)
PLATELET # BLD AUTO: 395 K/UL (ref 164–446)
PMV BLD AUTO: 9.9 FL (ref 9–12.9)
POTASSIUM SERPL-SCNC: 3.7 MMOL/L (ref 3.6–5.5)
RBC # BLD AUTO: 4.19 M/UL (ref 4.7–6.1)
SODIUM SERPL-SCNC: 135 MMOL/L (ref 135–145)
WBC # BLD AUTO: 14.9 K/UL (ref 4.8–10.8)

## 2022-05-30 PROCEDURE — 99233 SBSQ HOSP IP/OBS HIGH 50: CPT | Performed by: INTERNAL MEDICINE

## 2022-05-30 PROCEDURE — 80069 RENAL FUNCTION PANEL: CPT

## 2022-05-30 PROCEDURE — A9270 NON-COVERED ITEM OR SERVICE: HCPCS | Performed by: HOSPITALIST

## 2022-05-30 PROCEDURE — 770006 HCHG ROOM/CARE - MED/SURG/GYN SEMI*

## 2022-05-30 PROCEDURE — 700102 HCHG RX REV CODE 250 W/ 637 OVERRIDE(OP): Performed by: HOSPITALIST

## 2022-05-30 PROCEDURE — 82550 ASSAY OF CK (CPK): CPT

## 2022-05-30 PROCEDURE — A9270 NON-COVERED ITEM OR SERVICE: HCPCS | Performed by: INTERNAL MEDICINE

## 2022-05-30 PROCEDURE — 99358 PROLONG SERVICE W/O CONTACT: CPT | Performed by: PHYSICAL MEDICINE & REHABILITATION

## 2022-05-30 PROCEDURE — 36415 COLL VENOUS BLD VENIPUNCTURE: CPT

## 2022-05-30 PROCEDURE — 700105 HCHG RX REV CODE 258: Performed by: INTERNAL MEDICINE

## 2022-05-30 PROCEDURE — 97167 OT EVAL HIGH COMPLEX 60 MIN: CPT

## 2022-05-30 PROCEDURE — 82962 GLUCOSE BLOOD TEST: CPT

## 2022-05-30 PROCEDURE — 700102 HCHG RX REV CODE 250 W/ 637 OVERRIDE(OP): Performed by: INTERNAL MEDICINE

## 2022-05-30 PROCEDURE — 83735 ASSAY OF MAGNESIUM: CPT

## 2022-05-30 PROCEDURE — 85027 COMPLETE CBC AUTOMATED: CPT

## 2022-05-30 PROCEDURE — 97163 PT EVAL HIGH COMPLEX 45 MIN: CPT

## 2022-05-30 PROCEDURE — 700111 HCHG RX REV CODE 636 W/ 250 OVERRIDE (IP): Performed by: INTERNAL MEDICINE

## 2022-05-30 RX ORDER — ACETAMINOPHEN 325 MG/1
650 TABLET ORAL EVERY 6 HOURS PRN
Status: DISCONTINUED | OUTPATIENT
Start: 2022-05-30 | End: 2022-05-31 | Stop reason: HOSPADM

## 2022-05-30 RX ORDER — OMEPRAZOLE 20 MG/1
20 CAPSULE, DELAYED RELEASE ORAL DAILY
Status: DISCONTINUED | OUTPATIENT
Start: 2022-05-30 | End: 2022-05-31 | Stop reason: HOSPADM

## 2022-05-30 RX ORDER — SODIUM CHLORIDE 9 MG/ML
INJECTION, SOLUTION INTRAVENOUS CONTINUOUS
Status: DISCONTINUED | OUTPATIENT
Start: 2022-05-30 | End: 2022-05-31 | Stop reason: HOSPADM

## 2022-05-30 RX ORDER — MAGNESIUM SULFATE HEPTAHYDRATE 40 MG/ML
2 INJECTION, SOLUTION INTRAVENOUS ONCE
Status: COMPLETED | OUTPATIENT
Start: 2022-05-30 | End: 2022-05-30

## 2022-05-30 RX ADMIN — RIVAROXABAN 10 MG: 10 TABLET, FILM COATED ORAL at 17:58

## 2022-05-30 RX ADMIN — TAMSULOSIN HYDROCHLORIDE 0.4 MG: 0.4 CAPSULE ORAL at 05:42

## 2022-05-30 RX ADMIN — CLOPIDOGREL BISULFATE 75 MG: 75 TABLET ORAL at 05:44

## 2022-05-30 RX ADMIN — INSULIN GLARGINE-YFGN 11 UNITS: 100 INJECTION, SOLUTION SUBCUTANEOUS at 18:00

## 2022-05-30 RX ADMIN — METOPROLOL SUCCINATE 100 MG: 100 TABLET, EXTENDED RELEASE ORAL at 17:59

## 2022-05-30 RX ADMIN — SODIUM CHLORIDE: 9 INJECTION, SOLUTION INTRAVENOUS at 15:20

## 2022-05-30 RX ADMIN — ATORVASTATIN CALCIUM 80 MG: 40 TABLET, FILM COATED ORAL at 18:00

## 2022-05-30 RX ADMIN — INSULIN HUMAN 1 UNITS: 100 INJECTION, SOLUTION PARENTERAL at 21:07

## 2022-05-30 RX ADMIN — LOSARTAN POTASSIUM 50 MG: 25 TABLET, FILM COATED ORAL at 17:59

## 2022-05-30 RX ADMIN — ALLOPURINOL 100 MG: 100 TABLET ORAL at 05:43

## 2022-05-30 RX ADMIN — FLUOXETINE 40 MG: 20 CAPSULE ORAL at 05:42

## 2022-05-30 RX ADMIN — CEFAZOLIN 2 G: 2 INJECTION, POWDER, FOR SOLUTION INTRAMUSCULAR; INTRAVENOUS at 15:06

## 2022-05-30 RX ADMIN — LOSARTAN POTASSIUM 50 MG: 25 TABLET, FILM COATED ORAL at 05:42

## 2022-05-30 RX ADMIN — CEFAZOLIN 2 G: 2 INJECTION, POWDER, FOR SOLUTION INTRAMUSCULAR; INTRAVENOUS at 21:04

## 2022-05-30 RX ADMIN — OMEPRAZOLE 20 MG: 20 CAPSULE, DELAYED RELEASE ORAL at 15:19

## 2022-05-30 RX ADMIN — CEFAZOLIN 2 G: 2 INJECTION, POWDER, FOR SOLUTION INTRAMUSCULAR; INTRAVENOUS at 05:42

## 2022-05-30 RX ADMIN — MAGNESIUM SULFATE 2 G: 2 INJECTION INTRAVENOUS at 15:20

## 2022-05-30 ASSESSMENT — PAIN DESCRIPTION - PAIN TYPE
TYPE: ACUTE PAIN
TYPE: ACUTE PAIN

## 2022-05-30 ASSESSMENT — COGNITIVE AND FUNCTIONAL STATUS - GENERAL
WALKING IN HOSPITAL ROOM: TOTAL
MOVING TO AND FROM BED TO CHAIR: UNABLE
DRESSING REGULAR LOWER BODY CLOTHING: A LOT
DAILY ACTIVITIY SCORE: 12
CLIMB 3 TO 5 STEPS WITH RAILING: TOTAL
TOILETING: A LOT
PERSONAL GROOMING: A LOT
HELP NEEDED FOR BATHING: TOTAL
SUGGESTED CMS G CODE MODIFIER DAILY ACTIVITY: CL
MOVING FROM LYING ON BACK TO SITTING ON SIDE OF FLAT BED: UNABLE
SUGGESTED CMS G CODE MODIFIER MOBILITY: CN
STANDING UP FROM CHAIR USING ARMS: TOTAL
EATING MEALS: A LITTLE
DRESSING REGULAR UPPER BODY CLOTHING: A LOT
TURNING FROM BACK TO SIDE WHILE IN FLAT BAD: UNABLE
MOBILITY SCORE: 6

## 2022-05-30 ASSESSMENT — FIBROSIS 4 INDEX: FIB4 SCORE: 0.95

## 2022-05-30 ASSESSMENT — ENCOUNTER SYMPTOMS
CONSTIPATION: 0
ABDOMINAL PAIN: 0
FEVER: 0
COUGH: 0
WEAKNESS: 1
NAUSEA: 0
DIARRHEA: 0
HEADACHES: 0
BACK PAIN: 0
PALPITATIONS: 0
VOMITING: 0
SHORTNESS OF BREATH: 0
DIZZINESS: 0
CHILLS: 0

## 2022-05-30 ASSESSMENT — PATIENT HEALTH QUESTIONNAIRE - PHQ9
1. LITTLE INTEREST OR PLEASURE IN DOING THINGS: NOT AT ALL
2. FEELING DOWN, DEPRESSED, IRRITABLE, OR HOPELESS: NOT AT ALL
SUM OF ALL RESPONSES TO PHQ9 QUESTIONS 1 AND 2: 0

## 2022-05-30 ASSESSMENT — ACTIVITIES OF DAILY LIVING (ADL): TOILETING: REQUIRES ASSIST

## 2022-05-30 ASSESSMENT — GAIT ASSESSMENTS: GAIT LEVEL OF ASSIST: UNABLE TO PARTICIPATE

## 2022-05-30 NOTE — THERAPY
Physical Therapy   Initial Evaluation     Patient Name: Av Bob  Age:  77 y.o., Sex:  male  Medical Record #: [id number]  Today's Date: 5/30/2022     Precautions  Precautions: Fall Risk  Comments: recurrent UTIs    Assessment  Patient is 77 y.o. male who was recently admitted for weakness, sepsis, and recurrent UTI's. Pt has a hx of CVA with L sided deficits.  Pt presented to PT with impaired balance, poor postural control, generalized weakness, dec generalized locomotion, and dec activity tolerance. These impairments are limiting the patients ability with safe bed mobility, transfers, sit<>stands, and w/c mobility. Per spouse pt was primarily I with bed mobility and required CGA to Min A for transfers to w/c. Pt is currently required Mod to Max A for all upright mobility. Pt will continue to benefit from skilled PT while in house. Recommend post acute rehab prior to d/c home as pt appears to have good potential to return back to PLOF and have assist with transfers and bed mobility from spouse.     Plan    Recommend Physical Therapy 4 times per week until therapy goals are met for the following treatments:  Bed Mobility, Community Re-integration, Equipment, Manual Therapy, Neuro Re-Education / Balance, Self Care/Home Evaluation, Therapeutic Activities, and Therapeutic Exercises    DC Equipment Recommendations: (P) Unable to determine at this time  Discharge Recommendations: (P) Recommend post-acute placement for additional physical therapy services prior to discharge home (may benefit from PM&R consult)     Objective       05/30/22 0830   Initial Contact Note    Initial Contact Note Order Received and Verified, Physical Therapy Evaluation in Progress with Full Report to Follow.   Precautions   Precautions Fall Risk   Comments recurrent UTI's   Pain 0 - 10 Group   Therapist Pain Assessment Nurse Notified;0   Prior Living Situation   Prior Services Intermittent Physical Support for ADL Per Family    Housing / Facility 1 Story House   Steps Into Home 0   Steps In Home 0   Bathroom Set up Walk In Shower;Grab Bars;Shower Chair   Equipment Owned Wheelchair;Tub / Shower Seat;Grab Bar(s) In Tub / Shower;Grab Bar(s) By Toilet;Hand Held Shower   Lives with - Patient's Self Care Capacity Significant Other   Comments spouse provides about CGA to Andrew for all upright mobility for transfers and bed mobility. Pt is primarily w/c bound and requires assist with transfers only and is able to mobilize in w/c within household distances: PLOF was obtained from spouse as pt was unable to provide accurate history   Prior Level of Functional Mobility   Bed Mobility Independent   Transfer Status Required Assist   Ambulation Dependent   Assistive Devices Used Wheelchair   Wheelchair Independent   Comments spouse provided assist with step to transfers to W/C   History of Falls   History of Falls No   Cognition    Cognition / Consciousness X   Speech/ Communication Delayed Responses   Level of Consciousness Alert   Ability To Follow Commands 1 Step   Safety Awareness Impaired   Initiation Impaired   Comments confused and dealyed at time; poor motor planning   Passive ROM Lower Body   Passive ROM Lower Body X   Comments limited in L LE due chronic contracture in knee from prior CVA   Active ROM Lower Body    Active ROM Lower Body  X   Comments same as above; unable to demo full extension of L knee ROM, able to demo less than half AROM for L knee extension   Strength Lower Body   Lower Body Strength  X   Comments presents with gross strength of 2+/5 in L LE and gross strength of R LE 3/5   Sensation Lower Body   Lower Extremity Sensation   X   Comments dec sensation of L LE vs. R LE per report; unable to assess due to confusion   Lower Body Muscle Tone   Lower Body Muscle Tone  X   Lt Lower Extremity Muscle Tone Non Functional;Hypertonic;Rigidity;Contractures   Strength Upper Body   Upper Body Strength  X   Comments L UE flexion  contracture; basleine due to chronic CVA   Upper Body Muscle Tone   Upper Body Muscle Tone  X   Lt Upper Extremity Muscle Tone Contractures   Neurological Concerns   Neurological Concerns Yes   Comments hx of CVA with L sided deficits   Coordination Upper Body   Coordination Not Tested   Coordination Lower Body    Coordination Lower Body  Not Tested   Balance Assessment   Sitting Balance (Static) Fair -   Sitting Balance (Dynamic) Poor +   Standing Balance (Static) Trace   Standing Balance (Dynamic) Dependent   Weight Shift Sitting Poor   Weight Shift Standing Absent   Comments w/2 person assist from transfer with gait belt use   Gait Analysis   Gait Level Of Assist Unable to Participate   Weight Bearing Status fwb   Bed Mobility    Supine to Sit Moderate Assist   Scooting Moderate Assist   Rolling Minimal Assist to Rt.;Minimum Assist to Lt.   Comments HOB elevated and rails up   Functional Mobility   Sit to Stand Maximal Assist   Bed, Chair, Wheelchair Transfer Maximal Assist  (2 person)   Transfer Method Stand Pivot   Mobility EOB, sit<>stand, transfer to chair for breakfast   Comments cues for leaning forward and using R UE to assist in standing   How much difficulty does the patient currently have...   Turning over in bed (including adjusting bedclothes, sheets and blankets)? 1   Sitting down on and standing up from a chair with arms (e.g., wheelchair, bedside commode, etc.) 1   Moving from lying on back to sitting on the side of the bed? 1   How much help from another person does the patient currently need...   Moving to and from a bed to a chair (including a wheelchair)? 1   Need to walk in a hospital room? 1   Climbing 3-5 steps with a railing? 1   6 clicks Mobility Score 6   Activity Tolerance   Sitting in Chair left up in chair for breakfast; RN aware   Sitting Edge of Bed 10 mins   Standing 30 seconds x 3   Comments limited by weakness and nausea   Edema / Skin Assessment   Edema / Skin  Not Assessed    Patient / Family Goals    Patient / Family Goal #1 to go back home   Short Term Goals    Short Term Goal # 1 pt will go supine<>sit w/hob flat and rails up w/Min A in 6tx for safe d/c home   Short Term Goal # 2 pt will go sit<>stand w/HHA w/Min A in 6tx for transfers   Short Term Goal # 3 pt will transfer bed<>chair w/HHA w/Min A in 6tx for meals   Education Group   Education Provided Role of Physical Therapist   Role of Physical Therapist Patient Response Patient;Acceptance;Explanation;Demonstration;Verbal Demonstration;Action Demonstration   Problem List    Problems Impaired Transfers;Impaired Bed Mobility;Impaired Ambulation;Functional ROM Deficit;Functional Strength Deficit;Impaired Balance;Decreased Activity Tolerance   Anticipated Discharge Equipment and Recommendations   DC Equipment Recommendations Unable to determine at this time   Discharge Recommendations Recommend post-acute placement for additional physical therapy services prior to discharge home  (may benefit from PM&R consult)   Interdisciplinary Plan of Care Collaboration   IDT Collaboration with  Nursing;Occupational Therapist;Physician   Patient Position at End of Therapy Seated;Chair Alarm On;Call Light within Reach;Tray Table within Reach;Phone within Reach   Collaboration Comments aware of visit and recs   Session Information   Date / Session Number  5/30-1 (1/4, 6/5)

## 2022-05-30 NOTE — CARE PLAN
The patient is Stable - Low risk of patient condition declining or worsening    Shift Goals  Clinical Goals:  (safety)  Patient Goals:  (monitoring bg, no falls , reposition)    Progress made toward(s) clinical / shift goals:  pt denies pain this shift, blood sugars wdl,    Patient is not progressing towards the following goals:

## 2022-05-30 NOTE — PREADMISSION SCREENING NOTE
Pre-Admission Screening Form    Patient Information:   Name: Av Bob     MRN: [id number]       : 1945      Age: 77 y.o.   Gender: male      Race: White [7]       Marital Status:  [2]  Family Contact: Usha Bob        Relationship: Spouse [17]  Home Phone:            Cell Phone: [phone]  Advanced Directives: Yes  Code Status:  FULL  Current Attending Provider: Mike Dougherty M.D.  Referring Physician: Dr. Mike Dougherty      Physiatrist Consult: Dr. Emile Pratt       Referral Date: 2022  Primary Payor Source:  MEDICARE  Secondary Payor Source:  [facility]    Medical Information:   Date of Admission to Acute Care [place]2022  Room Number: 2216/01  Rehabilitation Diagnosis: 0002.1 - Brain Dysfunction: Non-Traumatic - Encehpalopathy  Immunization History   Administered Date(s) Administered   • Influenza Vaccine Adult HD 2018, 2019, 2020, 10/13/2021   • Moderna SARS-CoV-2 Vaccine 02/10/2021, 03/10/2021, 2021, 2022   • Pneumococcal Conjugate Vaccine (Prevnar/PCV-13) 2018   • Pneumococcal Vaccine (PCV7) - HISTORICAL DATA 2016   • Pneumococcal polysaccharide vaccine (PPSV-23) 2020   • Tdap Vaccine 2014   • Zoster Vaccine Live (ZVL) (Zostavax) - HISTORICAL DATA 2015     Allergies   Allergen Reactions   • Diphenhydramine Anxiety     Pt is able to tolerate  Mg benadryl with less anxiety   • Diphenhydramine Hcl Anxiety     Pt is able to tolerate  Mg benadryl with less anxiety   • Lorazepam Unspecified     Disorientation   • Ciprofloxacin      Rash,stomach ache   • Spironolactone      Acute kidney injury     Past Medical History:   Diagnosis Date   • (HCC) Chronic ulcer of right lower extremity with fat layer exposed 2018   • Acute cystitis without hematuria 2019   • Acute on chronic renal failure (HCC) 2020   • Acute respiratory failure with hypoxia (HCC) 2017   • CAD (coronary artery disease)      GIOVANNA to RCA in '97, GIOVANNA X2 to LAD and GIOVANNA X2 to OM in '09   • Cancer ([facility])     2017; chemo lympoma non hodgkins lymphoma   • Cataract     dez iol   • Cerebrovascular accident (CVA) ([facility]) 12/30/2016    Left arm weakness  etiology of stroke not established, lymphoma discovered on MRI evaluation of stroke, L hemiparesis much worse after acute infectious illness in mid 2017, but no specific diagnosed recurrent neurological etiology, all at [facility]   • Chickenpox    • CKD (chronic kidney disease) stage 3, GFR 30-59 ml/min ([facility])    • Controlled gout 2014   • Coronary atherosclerosis of native coronary artery     S/P PTCA (percutaneous transluminal coronary angioplasty), RCA, 5/1997, patent on cath 7/10/2009 at the time of interventions on his left anterior descending and circumflex coronary arteries   • Daytime sleepiness    • Depression    • Diabetes ([facility])    • Difficulty swallowing    • Enterococcal septicemia ([facility]) 8/12/2017   • Roberto hematuria 1/29/2020   • Frequent urination    • GERD (gastroesophageal reflux disease)    • Tajik measles    • History of renal calculi 11/19/2019   • Hydronephrosis 1/20/2020   • Hypertension 06/30/2021    pt states well controlled on meds   • Hypokalemia 2012    controlled with combination of ACE inhibitor or ARB plus spironolactone   • Hypomagnesemia 08/12/2017    etiology uncertain   • Influenza A 1/26/2020   • Insomnia    • Kidney stone    • Left hand weakness 5/20/2019   • Leg edema, right 1/22/2020   • 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 Barry   • Normocytic hypochromic anemia 5/20/2017   • NSTEMI (non-ST elevated myocardial infarction) ([facility]) 07/18/2017    complicating UTI with sepsis   • Pain 06/30/2021    right leg foot   • Peripheral vascular disease ([facility])    • Polyneuropathy in  diabetes(357.2) 9/11/2013   • Renal cyst 1/29/2020   • Renal mass 1/21/2020   • Septic shock (HCC) 5/20/2017   • Skin ulcer of calf ([facility]) 2015    Right, Dr. Terry and wound care   • Stroke ([facility]) 2016    left sided weakness   • Urinary bladder disorder    • Urinary incontinence     pt wears pads   • Weakness    • Wound of left leg 2012    Requiring surgery and debridment, Dr. Moore     Past Surgical History:   Procedure Laterality Date   • OH INJ LUMBAR/SACRAL,W/ IMAGING  7/27/2021    Procedure: Lumbar L5-S1 interlaminar epidural steroid injection;  Surgeon: Harpal Bennett M.D.;  Location: SURGERY REHAB PAIN MANAGEMENT;  Service: Pain Management   • OH UPPER GI ENDOSCOPY,DIAGNOSIS N/A 7/21/2021    Procedure: GASTROSCOPY - AND DILATION;  Surgeon: Neville Huerta M.D.;  Location: SURGERY SAME DAY [facility];  Service: Gastroenterology   • OH UPPER GI ENDOSCOPY,BIOPSY N/A 7/21/2021    Procedure: GASTROSCOPY, WITH BIOPSY;  Surgeon: Neville Huerta M.D.;  Location: SURGERY SAME DAY [facility];  Service: Gastroenterology   • LUMBAR TRANSFORAMINAL EPIDURAL STEROID INJECTION Right 3/29/2021    Procedure: RIGHT L3-4 and L4-5 transforaminal epidural steroid injection with fluoroscopic guidance;  Surgeon: Harpal Bennett M.D.;  Location: SURGERY REHAB PAIN MANAGEMENT;  Service: Pain Management   • LUMBAR TRANSFORAMINAL EPIDURAL STEROID INJECTION Right 11/2/2020    Procedure: INJECTION, STEROID, SPINE, LUMBAR, EPIDURAL, TRANSFORAMINAL APPROACH;  Surgeon: Harpal Bennett M.D.;  Location: SURGERY REHAB PAIN MANAGEMENT;  Service: Pain Management   • CYSTOSCOPY STENT PLACEMENT Left 2/12/2018    Procedure: CYSTOSCOPY  ;  Surgeon: Rey Barry M.D.;  Location: SURGERY [facility];  Service: Urology   • URETEROSCOPY Left 2/12/2018    Procedure: URETEROSCOPY- FLEXIBLE  ;  Surgeon: Rey Barry M.D.;  Location: SURGERY [facility];  Service: Urology   • LASERTRIPSY Left 2/12/2018    Procedure: LASERTRIPSY - LITHO  ;  Surgeon:  Rey Barry M.D.;  Location: SURGERY [facility];  Service: Urology   • WOUND CLOSURE GENERAL  4/3/2012    Performed by GERHARD GILBERT at SURGERY SAME DAY [facility] ORS   • ZJEFFREY CARDIAC CATH  2009    Stents to LAD, Om   • DAGOBERTO BY LAPAROSCOPY  1998   • ANGIOPLASTY  1997    RCA followed by other stents as noted above.    • ZZZ CARDIAC CATH  1997    stent RCA   • CATARACT EXTRACTION WITH IOL      bilateral   • LITHOTRIPSY     • TONSILLECTOMY     • TONSILLECTOMY AND ADENOIDECTOMY         History Leading to Admission, Conditions that Caused the Need for Rehab (CMS):     Shona Chun M.D.   Physician   [facility]   H&P      Signed   Date of Service:  5/28/2022  4:16 PM                 [facility] History & Physical Note     Date of Service  5/28/2022     Primary Care Physician  Madison Bunch D.O.     Consultants  None      Code Status  Full Code     Chief Complaint       Chief Complaint   Patient presents with   • Weakness       Weakness. Taking antibiotics for UTI. Unable to eat.      History of Presenting Illness  Av Bob is a 77 y.o. male with a past medical history of hypertension, dyslipidemia, gout and diabetes with recurrent urinary tract infections currently on antibiotics who presented 5/28/2022 with generalized weakness and diarrhea.  Patient not feeling better despite oral antibiotics.  Has worsening weakness intermittent chills but no documented fever.  Patient wife also reports having multiple episodes of loose bowel movements.  She did not notice any blood or mucus in stool.     I discussed the plan of care with emergency department physician, patient's wife was present at bedside and the patient.     Review of Systems  Review of Systems   Constitutional: Positive for chills and malaise/fatigue. Negative for fever.   Eyes: Negative for discharge and redness.   Respiratory: Negative for cough, shortness of breath and stridor.    Cardiovascular: Negative for  chest pain and leg swelling.   Gastrointestinal: Positive for diarrhea. Negative for abdominal pain and vomiting.   Genitourinary: Negative for flank pain.   Musculoskeletal: Negative for myalgias.   Skin: Negative.    Neurological: Negative for focal weakness.   Endo/Heme/Allergies: Does not bruise/bleed easily.   Psychiatric/Behavioral: The patient is not nervous/anxious.       Past Medical History   has a past medical history of ([facility]) Chronic ulcer of right lower extremity with fat layer exposed (12/27/2018), Acute cystitis without hematuria (6/30/2019), Acute on chronic renal failure ([facility]) (1/21/2020), Acute respiratory failure with hypoxia ([facility]) (5/20/2017), CAD (coronary artery disease), Cancer ([facility]), Cataract, Cerebrovascular accident (CVA) ([facility]) (12/30/2016), Chickenpox, CKD (chronic kidney disease) stage 3, GFR 30-59 ml/min ([facility]), Controlled gout (2014), Coronary atherosclerosis of native coronary artery, Daytime sleepiness, Depression, Diabetes ([facility]), Difficulty swallowing, Enterococcal septicemia ([facility]) (8/12/2017), Roberto hematuria (1/29/2020), Frequent urination, GERD (gastroesophageal reflux disease), Korean measles, History of renal calculi (11/19/2019), Hydronephrosis (1/20/2020), Hypertension (06/30/2021), Hypokalemia (2012), Hypomagnesemia (08/12/2017), Influenza A (1/26/2020), Insomnia, Kidney stone, Left hand weakness (5/20/2019), Leg edema, right (1/22/2020), Lymphoma ([facility]) (2/19/2017), Mixed hyperlipidemia, Muscle strain of right gluteal region (5/20/2019), Nephrolithiasis (2006), Normocytic hypochromic anemia (5/20/2017), NSTEMI (non-ST elevated myocardial infarction) ([facility]) (07/18/2017), Pain (06/30/2021), Peripheral vascular disease ([facility]), Polyneuropathy in diabetes(357.2) (9/11/2013), Renal cyst (1/29/2020), Renal mass (1/21/2020), Septic shock ([facility]) (5/20/2017), Skin ulcer of calf ([facility]) (2015), Stroke ([facility]) (2016), Urinary bladder disorder, Urinary incontinence, Weakness, and Wound of left  leg (2012).     Surgical History   has a past surgical history that includes lithotripsy; angioplasty (1997); wound closure general (4/3/2012); tonsillectomy and adenoidectomy; cataract extraction with iol; solo by laparoscopy (1998); cystoscopy stent placement (Left, 2/12/2018); ureteroscopy (Left, 2/12/2018); lasertripsy (Left, 2/12/2018); [facility] cardiac cath (1997); [facility] cardiac cath (2009); tonsillectomy; lumbar transforaminal epidural steroid injection (Right, 11/2/2020); lumbar transforaminal epidural steroid injection (Right, 3/29/2021); pr upper gi endoscopy,diagnosis (N/A, 7/21/2021); pr upper gi endoscopy,biopsy (N/A, 7/21/2021); and pr inj lumbar/sacral,w/ imaging (7/27/2021).      Family History  family history includes Cancer in his mother; Depression in his brother and mother; Diabetes in his father; Heart Disease in his brother and father; Kidney stones in his brother; Psychiatric Illness in his brother, mother, and another family member; Suicide Attempts in an other family member.       Social History   reports that he has never smoked. He has never used smokeless tobacco. He reports that he does not drink alcohol and does not use drugs.     Allergies        Allergies   Allergen Reactions   • Diphenhydramine Anxiety       Pt is able to tolerate  Mg benadryl with less anxiety   • Diphenhydramine Hcl Anxiety       Pt is able to tolerate  Mg benadryl with less anxiety   • Lorazepam Unspecified       Disorientation   • Ciprofloxacin         Rash,stomach ache   • Spironolactone         Acute kidney injury           Assessment/Plan:  Justification for Admission Status  I anticipate this patient will require at least two midnights for appropriate medical management, necessitating inpatient admission because Patient failed outpatient antibiotics, will require intravenous antibiotics, has multiple comorbid conditions including diabetes, chronic kidney disease and is wheelchair dependent     Recurrent UTI-  (present on admission)  Assessment & Plan  Currently on antibiotics, latest cultures showing staph aureus and Klebsiella pneumonia  Started on Zosyn in the emergency room, continue for now     Hyponatremia- (present on admission)  Assessment & Plan  Hypovolemic hyponatremia  I expect Na to improve with IVF     Dehydration- (present on admission)  Assessment & Plan  Likely secondary to reduced oral intake and increase insensory losses  Encourage oral intake as tolerated, antiemetics as needed.  Gentle intravenous hydration until adequate oral intake is achieved     Essential hypertension, benign- (present on admission)  Assessment & Plan  Resume home losartan and metoprolol with hold parameters     Diarrhea- (present on admission)  Assessment & Plan  Likely secondary to antibiotics.  Rule out C. difficile colitis     Type 2 diabetes mellitus, with long-term current use of insulin (HCC)- (present on admission)  Assessment & Plan  With hyperglycemia  Last glycated hemoglobin was 6.6 %  Long & short acting insulin  Accu-Checks, hypoglycemia protocol      Stage 3b chronic kidney disease (HCC)- (present on admission)  Assessment & Plan  Avoid/minimize nephrotoxins as much as possible, renally dose medications, monitor inputs and outputs      Idiopathic chronic gout of multiple sites without tophus- (present on admission)  Assessment & Plan  Resume home allopurinol     GERD with esophagitis- (present on admission)  Assessment & Plan  Resume home omeprazole     VTE prophylaxis: SCDs/TEDs and Xarelto 10 mg daily as prophylaxis            Co-morbidities: See above  Potential Risk - Complications: Cognitive Impairment, Contractures, Deep Vein Thrombosis, Incontinence, Malnutrition, Pain, Perceptual Impairment, Pneumonia, Pressure Ulcer, Urinary Tract Infection and Infection  Level of Risk: High    Ongoing Medical Management Needed (Medical/Nursing Needs):   Patient Active Problem List    Diagnosis Date Noted   • UTI (urinary  tract infection) 05/28/2022   • Dehydration 05/28/2022   • Hyponatremia 05/28/2022   • Pyelonephritis 05/24/2022   • Projectile vomiting with nausea 05/24/2022   • Pressure injury of buttock, stage 1 02/17/2022   • Acute prostatitis 01/13/2022   • Exposure to SARS-associated coronavirus 10/13/2021   • Hordeolum externum of left lower eyelid 09/14/2021   • Toenail avulsion, initial encounter- left foot 3rd digit 07/12/2021   • Dysphagia 03/15/2021   • Right posterior hip pain and right leg cramping 09/09/2020   • ASHLEY (obstructive sleep apnea) 08/27/2020   • Low testosterone in male 07/06/2020   • Nocturnal hypoxemia 07/06/2020   • Chronic fatigue 06/09/2020   • Essential hypertension, benign 05/06/2020   • Polypharmacy 02/04/2020   • Benign prostatic hyperplasia with urinary obstruction 01/29/2020   • Altered mobility due to old stroke 01/22/2020   • Sepsis without acute organ dysfunction ([facility]) 01/21/2020   • Normocytic anemia 01/21/2020   • Neurogenic bladder 11/19/2019   • PVD (peripheral vascular disease) ([facility]) 10/08/2019   • Diarrhea 07/02/2019   • Strain of flexor muscle of right hip 05/20/2019   • ([facility]) Tibial artery disease 12/27/2018   • GERD with esophagitis 10/19/2018   • Recurrent UTI 04/26/2018   • (HCC) Left hemiparesis 12/27/2017   • ([facility]) Diabetic nephropathy associated with type 2 diabetes mellitus 11/30/2017   • Psychophysiological insomnia 11/21/2017   • ([facility]) Moderate episode of recurrent major depressive disorder 11/07/2017   • Wheelchair dependent 11/07/2017   • Hypomagnesemia 08/12/2017   • Paroxysmal atrial fibrillation ([facility]) 05/23/2017   • Iron deficiency anemia 05/20/2017   • H/O non-Hodgkin's lymphoma 05/08/2017   • ([facility]) Hypertension associated with diabetes 03/22/2017   • Type 2 diabetes mellitus, with long-term current use of insulin (HCC) 12/30/2016   • H/O Cerebrovascular accident (CVA) in adulthood 12/30/2016   • Coronary artery disease involving native coronary artery 12/30/2016   •  Stage 3b chronic kidney disease (HCC) 08/25/2016   • Idiopathic chronic gout of multiple sites without tophus 01/28/2014   • Presence of BMS and drug coated stents in LAD coronary artery 12/13/2011   • Presence of drug coated stents in left circumflex coronary artery 12/13/2011   • Status post percutaneous transluminal coronary angioplasty 12/13/2011   • ([facility]) Hyperlipidemia associated with type 2 diabetes mellitus 12/13/2011     Current Vital Signs:   Temperature: 36.7 °C (98.1 °F) Pulse: 97 Respiration: 18 Blood Pressure : 106/65  Weight: 83.2 kg (183 lb 6.8 oz) Height: 182.9 cm (6')  Pulse Oximetry: 95 % O2 (LPM): 0      Completed Laboratory Reports:  Recent Labs     05/28/22  1506 05/29/22  0136 05/30/22  0045   WBC 17.1* 15.4* 14.9*   HEMOGLOBIN 12.7* 11.4* 12.0*   HEMATOCRIT 38.7* 34.8* 36.5*   PLATELETCT 350 341 395   SODIUM 131* 132* 135   POTASSIUM 4.2 3.9 3.7   BUN 28* 26* 22   CREATININE 1.63* 1.52* 1.49*   ALBUMIN  --  2.5* 2.5*   GLUCOSE 125* 79 105*     Additional Labs: Not Applicable    Prior Living Situation:   Housing / Facility: 1 Story House  Steps Into Home: 0  Steps In Home: 0  Lives with - Patient's Self Care Capacity: Spouse  Equipment Owned: Wheelchair, Tub / Shower Seat, Grab Bar(s) In Tub / Shower, Grab Bar(s) By Toilet, Hand Held Shower    Prior Level of Function / Living Situation:   Physical Therapy: Prior Services: Intermittent Physical Support for ADL Per Family  Housing / Facility: 1 Story House  Steps Into Home: 0  Steps In Home: 0  Bathroom Set up: Walk In Shower, Grab Bars, Shower Chair  Equipment Owned: Wheelchair, Tub / Shower Seat, Grab Bar(s) In Tub / Shower, Grab Bar(s) By Toilet, Hand Held Shower  Lives with - Patient's Self Care Capacity: Spouse  Bed Mobility: Independent  Transfer Status: Required Assist  Ambulation: Dependent  Assistive Devices Used: Wheelchair  Wheelchair: Independent  Current Level of Function:   Gait Level Of Assist: Unable to Participate  Weight  Bearing Status: fwb  Supine to Sit: Moderate Assist  Scooting: Moderate Assist  Rolling: Minimal Assist to Rt., Minimum Assist to Lt.  Comments: up EOB with PT  Sit to Stand: Maximal Assist  Bed, Chair, Wheelchair Transfer: Maximal Assist (max x2)  Transfer Method: Stand Pivot  Sitting in Chair: 10mins; up in recliner post session  Sitting Edge of Bed: 5 mins  Standing: < 1 min x 3  Occupational Therapy:   Self Feeding: Requires Assist (set up)  Grooming / Hygiene: Requires Assist (setup)  Bathing: Requires Assist  Dressing: Requires Assist  Toileting: Requires Assist  Medication Management: Dependent  Laundry: Dependent  Kitchen Mobility: Dependent  Finances: Dependent  Home Management: Dependent  Shopping: Dependent  Prior Level Of Mobility: Uses Wheel Chair for in Home Mobility, Uses Wheel Chair for Community Mobility  Driving / Transportation: Relatives / Others Provide Transportation  Prior Services: Intermittent Physical Support for ADL Per Family  Housing / Facility: [address] [place] [address]  Occupation (Pre-Hospital Vocational): Retired Due To Age  Current Level of Function:   Eating: Supervision (setup)  Upper Body Dressing: Maximal Assist  Lower Body Dressing: Maximal Assist  Speech Language Pathology:      Rehabilitation Prognosis/Potential: Good  Estimated Length of Stay: 10-17 days    Nursing:      Continent    Scope/Intensity of Services Recommended:  Physical Therapy: 1 hr / day  5 days / week. Therapeutic Interventions Required: Maximize Endurance, Mobility, Strength and Safety  Occupational Therapy: 1 hr / day 5 days / week. Therapeutic Interventions Required: Maximize Self Care, ADLs, IADLs and Energy Conservation  Speech & Language Pathology: 1 hr / day 5 days / week. Therapeutic Interventions Required: Maximize Cognition, Swallowing and Safety  Rehabilitation Nursin/. Therapeutic Interventions Required: Monitor Pain, Skin, Vital Signs, Intake and Output, Labs, Safety, Aspiration Risk, Family Training  and DVT Prophylaxis; Bowel and bladder regimen; Infection control; ADL's.   Rehabilitation Physician: 3 - 5 days / week. Therapeutic Interventions Required: Medical Management  Respiratory Care: Consult. Therapeutic Interventions Required: Pulmonary Toileting, Aspiration Risk and Respiratory care per protocol  Dietician: Consult. Therapeutic Interventions Required: Nutritional evaluation with recommendations to promote optimal health and healing.     He requires 24-hour rehabilitation nursing to manage bowel and bladder function, skin care, nutrition and fluid intake, pulmonary hygiene, pain control, safety, medication management and patient/family goals. In addition, rehabilitation nursing will reiterate and reinforce therapy skills and equipment use, including ADLs, as well as provide education to the patient and family. Av Bob is willing to participate in and is able to tolerate the proposed plan of care.    Rehabilitation Goals and Plan (Expected frequency & duration of treatment in the IRF):   Return to the Community, Modified Independent Level of Care, Outpatient Support and Family Able to Provide 24/7 Assistance  Anticipated Date of Rehabilitation Admission: 05/30/2022  Patient/Family oriented IRF level of care/facility/plan: Yes  Patient/Family willing to participate in IRF care/facility/plan: Yes  Patient able to tolerate IRF level of care proposed: Yes  Patient has potential to benefit IRF level of care proposed: Yes  Comments: Not Applicable    Special Needs or Precautions - Medical Necessity:  Safety Concerns/Precautions:  Fall Risk / High Risk for Falls, Balance, Cognition and Bed / Chair Alarm  Pain Management  Diet:   DIET ORDERS (From admission to next 24h)     Start     Ordered    05/28/22 5803  Diet Order Diet: Cardiac; Second Modifier: (optional): Consistent CHO (Diabetic)  ALL MEALS        Question Answer Comment   Diet: Cardiac    Second Modifier: (optional) Consistent CHO  (Diabetic)        05/28/22 1644                Anticipated Discharge Destination / Patient/Family Goal:  Destination: Home with Assistance Support System: Spouse  Anticipated home health services: OT, PT, SLP and Nursing  Previously used HH service/ provider: Not Applicable  Anticipated DME Needs: To be determine  Outpatient Services: To be determinedo be determined  Alternative resources to address additional identified needs:   Future Appointments   Date Time Provider Department Center   9/27/2022 11:40 AM Madison Bunch D.O. [facility] None   10/19/2022  1:00 PM Laly Jarvis M.D. [facility] [address]   11/3/2022  3:20 PM Osvaldo Tucker M.D. [facility] None       Pre-Screen Completed: 5/30/2022 1:28 PM Glory Shore R.N.

## 2022-05-30 NOTE — DISCHARGE PLANNING
[facility] Transitional [facility] Coordination    Physiatry Dr. Pratt recommending appropriate candidate for inpatient rehab.      Call out to spouse Usha [facility] to discuss IRF referral.  Reviewed specifics of inpatient rehab, answered questions/concerns.  Usha endorses IRF admission, reports positive experience IPR facility in [place] after patient suffered stroke in 2016.  She is hopeful Av will regain strength to perform transfers at Min assist level.  Reviewed Medicare/[place] insurance to include no anticipated out-of-pocket cost for IRF.  Reviewed visitor policy [facility] - 2 designated individuals allowed to visit 9a-5p daily - after confirmation of negative COVID on arrival [facility].  Test may require up to 48hrs for result.  Usha voiced understanding.  Provided [facility] contact information.      Per Dr. Dougherty, not yet medically clear for IPR.  [facility] will follow.

## 2022-05-30 NOTE — CARE PLAN
Refill     Insulin Srrg 0.3/31 Mis BD D   Insulin Syringe /Needle U-100     The patient is Stable - Low risk of patient condition declining or worsening    Shift Goals  Clinical Goals: Patient will be free from fall this shift  Patient Goals:  (monitoring bg, no falls , reposition)    Progress made toward(s) clinical / shift goals:  No reported falls this shift. Patient has been transferred by PT to the cardiac chair and 2 person assist back to the bed. Patient is unable to use his legs with generalized weakness.      Problem: Knowledge Deficit - Standard  Goal: Patient and family/care givers will demonstrate understanding of plan of care, disease process/condition, diagnostic tests and medications  Outcome: Progressing     Problem: Skin Integrity  Goal: Skin integrity is maintained or improved  Outcome: Progressing     Problem: Pain - Standard  Goal: Alleviation of pain or a reduction in pain to the patient’s comfort goal  Outcome: Progressing

## 2022-05-30 NOTE — THERAPY
"Occupational Therapy   Initial Evaluation     Patient Name: Av Bob  Age:  77 y.o., Sex:  male  Medical Record #: [id number]  Today's Date: 5/30/2022     Precautions  Precautions: Fall Risk  Comments: recurrent UTIs    Assessment    Patient is 77 y.o. male with a diagnosis of weakness secondary to UTI. Additional factors influencing patient status / progress: CKD, DM, HTN, DLD, gout, recurrent UTIs, previous CVA with L sided weakness. Unable to collect PLOF info from pt as he is currently confused; states he was \"getting around\" with a walker. 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 She provides most assistance to pt (ADLs/IADLs), but reports pt can groom self and feed self with set up (cutting up meats, handing grooming items). Pt was noted to be confused and forgetful today, unable to recall or give accurate information re: PLOF, and presented with generalized weakness, decreased activity tolerance, reduced balance, and impaired safety. He also c/o nausea after eating breakfast, actively vomited. RN made aware. He will benefit from acute OT while in house, and will require post acute placement prior to dc back to his PLOF. Wife is supportive of pt and is aware of POC, agreeable only to acute rehab and not SNF, as she feels pt will not thrive in that setting. Her goal is to be able to get pt to transfer by himself again. Please consider PM&R referral.    Plan    Recommend Occupational Therapy 4 times per week until therapy goals are met for the following treatments:  Adaptive Equipment, Cognitive Skill Development, Community Re-integration, Neuro Re-Education / Balance, Self Care/Activities of Daily Living, Therapeutic Activities, and Therapeutic Exercises.    DC Equipment Recommendations: Unable to determine " "at this time  Discharge Recommendations: Recommend post-acute placement for additional occupational therapy services prior to discharge home (Please consider PM&R consult)     Subjective    \"Yeah, I guess so.\"     Objective       05/30/22 0832   Prior Living Situation   Prior Services Intermittent Physical Support for ADL Per Family   Housing / Facility 1 Story House   Steps Into Home 0   Steps In Home 0   Bathroom Set up Walk In Shower;Grab Bars;Shower Chair   Equipment Owned Wheelchair;Tub / Shower Seat;Grab Bar(s) In Tub / Shower;Grab Bar(s) By Toilet;Hand Held Shower   Lives with - Patient's Self Care Capacity Spouse   Prior Level of ADL Function   Self Feeding Requires Assist  (set up)   Grooming / Hygiene Requires Assist  (setup)   Bathing Requires Assist   Dressing Requires Assist   Toileting Requires Assist   Prior Level of IADL Function   Medication Management Dependent   Laundry Dependent   Kitchen Mobility Dependent   Finances Dependent   Home Management Dependent   Shopping Dependent   Prior Level Of Mobility Uses Wheel Chair for in Home Mobility;Uses Wheel Chair for Community Mobility   Driving / Transportation Relatives / Others Provide Transportation   Occupation (Pre-Hospital Vocational) Retired Due To Age   History of Falls   History of Falls No   Precautions   Precautions Fall Risk   Comments recurrent UTIs   Pain   Pain Scales 0 to 10 Scale    Intervention Declines   Pain 0 - 10 Group   Pain Rating Scale (NPRS) 0   Therapist Pain Assessment Post Activity Pain Same as Prior to Activity   Cognition    Cognition / Consciousness X   Speech/ Communication Delayed Responses   Level of Consciousness Alert   Ability To Follow Commands 1 Step   Safety Awareness Impaired   Initiation Impaired   Comments confused re: prior situation   Passive ROM Upper Body   Comments L UE flexor synergy from old CVA   Active ROM Upper Body   Active ROM Upper Body  X   Dominant Hand Right   Comments L UE flexor synergy from " old CVA; R UE WNL   Strength Upper Body   Upper Body Strength  X   Comments L UE flexor synergy from old CVA, R UE WNL   Balance Assessment   Sitting Balance (Static) Fair -   Sitting Balance (Dynamic) Poor +   Standing Balance (Static) Trace   Standing Balance (Dynamic) Dependent   Weight Shift Sitting Poor   Weight Shift Standing Absent   Bed Mobility    Comments up EOB with PT   ADL Assessment   Eating Supervision  (setup)   Grooming Seated;Maximal Assist   Upper Body Dressing Maximal Assist   Lower Body Dressing Maximal Assist   How much help from another person does the patient currently need...   Putting on and taking off regular lower body clothing? 2   Bathing (including washing, rinsing, and drying)? 1   Toileting, which includes using a toilet, bedpan, or urinal? 2   Putting on and taking off regular upper body clothing? 2   Taking care of personal grooming such as brushing teeth? 2   Eating meals? 3   6 Clicks Daily Activity Score 12   Modified Vera (mRS)   Modified Stark Score 4   Functional Mobility   Sit to Stand Maximal Assist   Bed, Chair, Wheelchair Transfer Maximal Assist  (max x2)   Transfer Method Stand Pivot   Activity Tolerance   Sitting in Chair 10mins; up in recliner post session   Sitting Edge of Bed 5 mins   Standing < 1 min x 3   Comments limited by weakness, fatigue, and N/V   Short Term Goals   Short Term Goal # 1 pt will complete seated G/H at setup within 5 visits   Short Term Goal # 2 pt will participate in STS and ADL txfs at mod A level within 5 OT visits   Education Group   Education Provided Role of Occupational Therapist   Role of Occupational Therapist Patient Response Patient;Explanation;Acceptance   Problem List   Problem List Decreased Active Daily Living Skills;Decreased Functional Mobility;Decreased Activity Tolerance;Safety Awareness Deficits / Cognition;Impaired Postural Control / Balance   Anticipated Discharge Equipment and Recommendations   DC Equipment  Recommendations Unable to determine at this time   Discharge Recommendations Recommend post-acute placement for additional occupational therapy services prior to discharge home  (Please consider PM&R consult)   Interdisciplinary Plan of Care Collaboration   IDT Collaboration with  Nursing;Physical Therapist   Patient Position at End of Therapy Seated;Chair Alarm On;Call Light within Reach;Tray Table within Reach   Collaboration Comments RN aware of OT session   Session Information   Date / Session Number  5/30 #1 (1/4, 6/5)   Priority 3

## 2022-05-30 NOTE — DISCHARGE PLANNING
[facility] Transitional Care Coordination    Referral from:  Dr. Dougherty  Insurance Provider on Facesheet: Medicare/[place]  Potential Rehab Diagnosis: Debility    Chart review indicates patient has on going medical management and therapy needs to possibly meet inpatient rehab facility criteria with the goal of returning to community.    D/C support: Spouse     Physiatry to consult.     Last Covid test:       Thank you for the referral.

## 2022-05-30 NOTE — PROGRESS NOTES
Received report from LETICIA Varela.  Assumed Pt. Care  Pt. A&Ox3. Disoriented to place.  No sign of cardiac and respiratory distress.  Denies any pain.  Pt. Is crying during assessment. Patient is looking for his wife. He said he is worried about her. Patient is reassured that wife will be coming. Refuse his breakfast at this time.  Bed at lowest position.  Bed alarm is on. Call light and personal belonging within reach.   Encourage to call for assistance.

## 2022-05-30 NOTE — PROGRESS NOTES
[facility] Medicine Daily Progress Note    Date of Service  5/30/2022    Chief Complaint  Av Bob is a 77 y.o. male admitted 5/28/2022 with  Chief Complaint   Patient presents with   • Weakness     Weakness. Taking antibiotics for UTI. Unable to eat.     Hospital Course  77-year-old male past medical history hypertension, dyslipidemia, gout, T2DM, recurrent UTI's admitted/20/22 for his weakness and diarrhea.  Is associated worsening weakness, intermittent chills.  Is admitted with evidence of sepsis including leukocytosis 17,000, respiratory rate 24 positive for UTI and yeast.      As per patient's wife, Mr. Corea had acute stroke in the [place] area around 2016.  She stated he was doing well after the stroke and was able to recover function. Patient was able to still walk.  Shortly after he was diagnosed with non-Hodgkin's lymphoma.  Patient [place] the [place].  At some time patient suffered septic shock and was treated in the ICU in [place]. She reports he had Norovirus at that time.  It was after that time the patient began to show physical deterioration and debility.  He had ongoing infections subsequently and as per wife he never regained his previous physical abilities.      Patient has required urinary catheterization at home, was more frequent but currently now down to 1/day.  As per wife she makes attempts to keep them clean during the catheterization, uses soap and water.  Gloves and sterile lubricant.  Patient has followed with urology [place] with Dr. Barry.  Patient had recent TURP procedure for urinary retention.      He has had ongoing UTI since 2020.  Staph aureus pansensitive - 5/24/22 and 03/05/2021  Klebsiella pneumoniae pansensitive - 3/3/22, 6/25/20, 5/14/20   Proteus mirabilis - resistant to minocycline, nitrofurantoin - 11/20/2021 through 01/22/22    Interval Problem Update  Patient stated he felt good today, denied any new  complaints.  I spoke with brother in law and wife today about patient's progress, and need for rehab. I asked wife to look into facilities if she needs ongoing or increasing help, if Mr. Bob continues to decline in his physical abilities.  CPK 33, not indicating rhabdo as cause to patient's weakness.   Leukocytosis slowly improving. Still 14.9. Unasyn changed to cefazolin overnight.    I have personally seen and examined the patient at bedside. I discussed the plan of care with patient, family, bedside RN, charge RN,  and pharmacy.    Consultants/Specialty  none    Code Status  Full Code    Disposition  Patient is not medically cleared for discharge.   Anticipate discharge to to home with close outpatient follow-up.  I have placed the appropriate orders for post-discharge needs.    Review of Systems  Review of Systems   Constitutional: Positive for malaise/fatigue. Negative for chills and fever.   Respiratory: Negative for cough and shortness of breath.    Cardiovascular: Negative for chest pain and palpitations.   Gastrointestinal: Negative for abdominal pain, constipation, diarrhea, nausea and vomiting.   Genitourinary: Negative for dysuria and frequency.   Musculoskeletal: Negative for back pain and joint pain.   Neurological: Positive for weakness. Negative for dizziness and headaches.   All other systems reviewed and are negative.       Physical Exam  Temp:  [36.7 °C (98.1 °F)-37.2 °C (98.9 °F)] 36.7 °C (98.1 °F)  Pulse:  [78-97] 97  Resp:  [16-18] 18  BP: (106-143)/(63-82) 106/65  SpO2:  [94 %-95 %] 95 %    Physical Exam  Vitals and nursing note reviewed.   Constitutional:       General: He is not in acute distress.     Comments: Frail, thin appearing elderly male   HENT:      Mouth/Throat:      Mouth: Mucous membranes are dry.      Pharynx: No oropharyngeal exudate.   Eyes:      General: No scleral icterus.     Extraocular Movements: Extraocular movements intact.   Cardiovascular:      Rate  and Rhythm: Normal rate and regular rhythm.      Pulses: Normal pulses.      Heart sounds: Normal heart sounds. No murmur heard.  Pulmonary:      Effort: Pulmonary effort is normal. No respiratory distress.      Breath sounds: Normal breath sounds.   Abdominal:      General: Abdomen is flat. Bowel sounds are normal. There is no distension.      Palpations: Abdomen is soft.   Genitourinary:     Comments: Michaels in place, dark urine today  Musculoskeletal:         General: No swelling.      Cervical back: Normal range of motion and neck supple.      Comments: Sarcopenic   Skin:     General: Skin is warm.      Capillary Refill: Capillary refill takes less than 2 seconds.      Coloration: Skin is not jaundiced.   Neurological:      General: No focal deficit present.      Mental Status: He is alert and oriented to person, place, and time. Mental status is at baseline.      Motor: Weakness present.   Psychiatric:         Mood and Affect: Mood normal.         Behavior: Behavior normal.         Fluids    Intake/Output Summary (Last 24 hours) at 5/30/2022 1517  Last data filed at 5/30/2022 1200  Gross per 24 hour   Intake 480 ml   Output 2050 ml   Net -1570 ml       Laboratory  Recent Labs     05/28/22  1506 05/29/22  0136 05/30/22  0045   WBC 17.1* 15.4* 14.9*   RBC 4.40* 3.98* 4.19*   HEMOGLOBIN 12.7* 11.4* 12.0*   HEMATOCRIT 38.7* 34.8* 36.5*   MCV 88.0 87.4 87.1   MCH 28.9 28.6 28.6   MCHC 32.8* 32.8* 32.9*   RDW 45.3 44.2 44.7   PLATELETCT 350 341 395   MPV 10.0 10.0 9.9     Recent Labs     05/28/22  1506 05/29/22  0136 05/30/22  0045   SODIUM 131* 132* 135   POTASSIUM 4.2 3.9 3.7   CHLORIDE 98 103 103   CO2 20 18* 19*   GLUCOSE 125* 79 105*   BUN 28* 26* 22   CREATININE 1.63* 1.52* 1.49*   CALCIUM 8.8 8.5 8.5                   Imaging  DX-CHEST-PORTABLE (1 VIEW)   Final Result      No acute cardiac or pulmonary abnormalities are identified.           Assessment/Plan  * Recurrent UTI- (present on admission)  Assessment &  Plan  Currently on antibiotics, latest cultures showing staph aureus and Klebsiella pneumonia  He has had ongoing UTI since 2020.  Staph aureus pansensitive - 5/24/22 and 03/05/2021  Klebsiella pneumoniae pansensitive - 3/3/22, 6/25/20, 5/14/20   Proteus mirabilis - resistant to minocycline, nitrofurantoin - 11/20/2021 through 01/22/22    Started on Zosyn in the emergency room. Changed to IV cefazolin.  Recent culture 5/24/22 showed S. Aureus (MSSA).  Patient did not tolerate Bactrim, caused him loose stools. C. Diff negative.  Counseled wife on possible sources of recurrent UTI, including contamination from home self catheterization (MSSA), stool (Klebsiella) and hx of nephrolithasis (Proteus).    Sepsis without acute organ dysfunction (HCC)- (present on admission)  Assessment & Plan  This is Sepsis Present on admission  SIRS criteria identified on my evaluation include: Tachycardia, with heart rate greater than 90 BPM and Leukocytosis, with WBC greater than 12,000  Source is recurrent bacterial UTI  Sepsis protocol initiated  Fluid resuscitation ordered per protocol  Crystalloid Fluid Administration: Fluid resuscitation ordered per standard protocol - 30 mL/kg per current or ideal body weight  IV antibiotics as appropriate for source of sepsis - Zosyn  Reassessment: I have reassessed the patient's hemodynamic status  Lactic acid remained < 2    Hyponatremia- (present on admission)  Assessment & Plan  Hypovolemic hyponatremia, 131 on admission  I expect Na to improve with IVF  Improved to 135. Recheck labs    Dehydration- (present on admission)  Assessment & Plan  Likely secondary to reduced oral intake and increase insensory losses  Encourage oral intake as tolerated, antiemetics as needed.  Gentle intravenous hydration until adequate oral intake is achieved    Essential hypertension, benign- (present on admission)  Assessment & Plan  Resume home losartan and metoprolol with hold parameters    Diarrhea- (present  on admission)  Assessment & Plan  Likely secondary to antibiotic Bactrim.  Ruled out C. difficile colitis    GERD with esophagitis- (present on admission)  Assessment & Plan  Resume home omeprazole    Hypomagnesemia- (present on admission)  Assessment & Plan  Due to diarrhea  Ongoing low, 1.5 up from 1.3. replacing aggressively with IV mag sulfate 2g    Type 2 diabetes mellitus, with long-term current use of insulin (HCC)- (present on admission)  Assessment & Plan  With hyperglycemia  Last glycated hemoglobin was 6.6 %  Long & short acting insulin  Accu-Checks, hypoglycemia protocol     Stage 3b chronic kidney disease (HCC)- (present on admission)  Assessment & Plan  Avoid/minimize nephrotoxins as much as possible, renally dose medications, monitor inputs and outputs     Idiopathic chronic gout of multiple sites without tophus- (present on admission)  Assessment & Plan  Resume home allopurinol       VTE prophylaxis: Xarelto 10 mg daily as prophylaxis    I have performed a physical exam and reviewed and updated ROS and Plan today (5/30/2022). In review of yesterday's note (5/29/2022), there are no changes except as documented above.

## 2022-05-31 ENCOUNTER — HOSPITAL ENCOUNTER (INPATIENT)
Facility: REHABILITATION | Age: 77
LOS: 21 days | DRG: 092 | End: 2022-06-21
Attending: PHYSICAL MEDICINE & REHABILITATION | Admitting: PHYSICAL MEDICINE & REHABILITATION
Payer: MEDICARE

## 2022-05-31 VITALS
WEIGHT: 183.42 LBS | RESPIRATION RATE: 18 BRPM | HEART RATE: 71 BPM | TEMPERATURE: 97.4 F | DIASTOLIC BLOOD PRESSURE: 73 MMHG | SYSTOLIC BLOOD PRESSURE: 151 MMHG | BODY MASS INDEX: 24.84 KG/M2 | HEIGHT: 72 IN | OXYGEN SATURATION: 96 %

## 2022-05-31 DIAGNOSIS — N30.00 ACUTE CYSTITIS WITHOUT HEMATURIA: ICD-10-CM

## 2022-05-31 DIAGNOSIS — E11.69 HYPERLIPIDEMIA ASSOCIATED WITH TYPE 2 DIABETES MELLITUS (HCC): ICD-10-CM

## 2022-05-31 DIAGNOSIS — I10 ESSENTIAL HYPERTENSION, BENIGN: ICD-10-CM

## 2022-05-31 DIAGNOSIS — I82.441 ACUTE DEEP VEIN THROMBOSIS (DVT) OF TIBIAL VEIN OF RIGHT LOWER EXTREMITY (HCC): ICD-10-CM

## 2022-05-31 DIAGNOSIS — G93.40 ACUTE ENCEPHALOPATHY: ICD-10-CM

## 2022-05-31 DIAGNOSIS — I82.4Y1 ACUTE DEEP VEIN THROMBOSIS (DVT) OF PROXIMAL VEIN OF RIGHT LOWER EXTREMITY (HCC): ICD-10-CM

## 2022-05-31 DIAGNOSIS — E78.5 HYPERLIPIDEMIA ASSOCIATED WITH TYPE 2 DIABETES MELLITUS (HCC): ICD-10-CM

## 2022-05-31 DIAGNOSIS — K21.00 GASTROESOPHAGEAL REFLUX DISEASE WITH ESOPHAGITIS, UNSPECIFIED WHETHER HEMORRHAGE: ICD-10-CM

## 2022-05-31 DIAGNOSIS — M25.551 RIGHT HIP PAIN: ICD-10-CM

## 2022-05-31 LAB
ALBUMIN SERPL BCP-MCNC: 2.4 G/DL (ref 3.2–4.9)
BUN SERPL-MCNC: 21 MG/DL (ref 8–22)
CALCIUM SERPL-MCNC: 8.5 MG/DL (ref 8.4–10.2)
CHLORIDE SERPL-SCNC: 107 MMOL/L (ref 96–112)
CO2 SERPL-SCNC: 19 MMOL/L (ref 20–33)
CREAT SERPL-MCNC: 1.36 MG/DL (ref 0.5–1.4)
ERYTHROCYTE [DISTWIDTH] IN BLOOD BY AUTOMATED COUNT: 45.3 FL (ref 35.9–50)
FLUAV RNA SPEC QL NAA+PROBE: NEGATIVE
FLUBV RNA SPEC QL NAA+PROBE: NEGATIVE
GFR SERPLBLD CREATININE-BSD FMLA CKD-EPI: 54 ML/MIN/1.73 M 2
GLUCOSE BLD STRIP.AUTO-MCNC: 101 MG/DL (ref 65–99)
GLUCOSE BLD STRIP.AUTO-MCNC: 113 MG/DL (ref 65–99)
GLUCOSE BLD STRIP.AUTO-MCNC: 118 MG/DL (ref 65–99)
GLUCOSE BLD STRIP.AUTO-MCNC: 150 MG/DL (ref 65–99)
GLUCOSE SERPL-MCNC: 104 MG/DL (ref 65–99)
HCT VFR BLD AUTO: 36.9 % (ref 42–52)
HGB BLD-MCNC: 12.1 G/DL (ref 14–18)
MAGNESIUM SERPL-MCNC: 1.6 MG/DL (ref 1.5–2.5)
MCH RBC QN AUTO: 28.7 PG (ref 27–33)
MCHC RBC AUTO-ENTMCNC: 32.8 G/DL (ref 33.7–35.3)
MCV RBC AUTO: 87.6 FL (ref 81.4–97.8)
PHOSPHATE SERPL-MCNC: 2.6 MG/DL (ref 2.5–4.5)
PLATELET # BLD AUTO: 420 K/UL (ref 164–446)
PMV BLD AUTO: 9.5 FL (ref 9–12.9)
POTASSIUM SERPL-SCNC: 4 MMOL/L (ref 3.6–5.5)
RBC # BLD AUTO: 4.21 M/UL (ref 4.7–6.1)
RSV RNA SPEC QL NAA+PROBE: NEGATIVE
SARS-COV-2 RNA RESP QL NAA+PROBE: NOTDETECTED
SODIUM SERPL-SCNC: 137 MMOL/L (ref 135–145)
SPECIMEN SOURCE: NORMAL
WBC # BLD AUTO: 16.1 K/UL (ref 4.8–10.8)

## 2022-05-31 PROCEDURE — 0241U HCHG SARS-COV-2 COVID-19 NFCT DS RESP RNA 4 TRGT MIC: CPT

## 2022-05-31 PROCEDURE — A9270 NON-COVERED ITEM OR SERVICE: HCPCS | Performed by: INTERNAL MEDICINE

## 2022-05-31 PROCEDURE — 83735 ASSAY OF MAGNESIUM: CPT

## 2022-05-31 PROCEDURE — A9270 NON-COVERED ITEM OR SERVICE: HCPCS | Performed by: HOSPITALIST

## 2022-05-31 PROCEDURE — A9270 NON-COVERED ITEM OR SERVICE: HCPCS | Performed by: PHYSICAL MEDICINE & REHABILITATION

## 2022-05-31 PROCEDURE — 700102 HCHG RX REV CODE 250 W/ 637 OVERRIDE(OP): Performed by: HOSPITALIST

## 2022-05-31 PROCEDURE — 99239 HOSP IP/OBS DSCHRG MGMT >30: CPT | Performed by: HOSPITALIST

## 2022-05-31 PROCEDURE — 36415 COLL VENOUS BLD VENIPUNCTURE: CPT

## 2022-05-31 PROCEDURE — 770010 HCHG ROOM/CARE - REHAB SEMI PRIVAT*

## 2022-05-31 PROCEDURE — 80069 RENAL FUNCTION PANEL: CPT

## 2022-05-31 PROCEDURE — 700111 HCHG RX REV CODE 636 W/ 250 OVERRIDE (IP): Performed by: PHYSICAL MEDICINE & REHABILITATION

## 2022-05-31 PROCEDURE — 700105 HCHG RX REV CODE 258: Performed by: INTERNAL MEDICINE

## 2022-05-31 PROCEDURE — 82962 GLUCOSE BLOOD TEST: CPT | Mod: 91

## 2022-05-31 PROCEDURE — 700111 HCHG RX REV CODE 636 W/ 250 OVERRIDE (IP): Performed by: INTERNAL MEDICINE

## 2022-05-31 PROCEDURE — 94760 N-INVAS EAR/PLS OXIMETRY 1: CPT

## 2022-05-31 PROCEDURE — 99223 1ST HOSP IP/OBS HIGH 75: CPT | Performed by: PHYSICAL MEDICINE & REHABILITATION

## 2022-05-31 PROCEDURE — 85027 COMPLETE CBC AUTOMATED: CPT

## 2022-05-31 PROCEDURE — 700102 HCHG RX REV CODE 250 W/ 637 OVERRIDE(OP): Performed by: INTERNAL MEDICINE

## 2022-05-31 PROCEDURE — 700105 HCHG RX REV CODE 258: Performed by: PHYSICAL MEDICINE & REHABILITATION

## 2022-05-31 PROCEDURE — 700102 HCHG RX REV CODE 250 W/ 637 OVERRIDE(OP): Performed by: PHYSICAL MEDICINE & REHABILITATION

## 2022-05-31 RX ORDER — GABAPENTIN 100 MG/1
100 CAPSULE ORAL
Status: CANCELLED | OUTPATIENT
Start: 2022-05-31

## 2022-05-31 RX ORDER — ONDANSETRON 4 MG/1
4 TABLET, ORALLY DISINTEGRATING ORAL 4 TIMES DAILY PRN
Status: DISCONTINUED | OUTPATIENT
Start: 2022-05-31 | End: 2022-06-21 | Stop reason: HOSPADM

## 2022-05-31 RX ORDER — CLOPIDOGREL BISULFATE 75 MG/1
75 TABLET ORAL
Status: DISCONTINUED | OUTPATIENT
Start: 2022-06-01 | End: 2022-06-21 | Stop reason: HOSPADM

## 2022-05-31 RX ORDER — GABAPENTIN 100 MG/1
100 CAPSULE ORAL
Status: DISCONTINUED | OUTPATIENT
Start: 2022-05-31 | End: 2022-06-21 | Stop reason: HOSPADM

## 2022-05-31 RX ORDER — TRAZODONE HYDROCHLORIDE 50 MG/1
50 TABLET ORAL
Status: DISCONTINUED | OUTPATIENT
Start: 2022-05-31 | End: 2022-06-21 | Stop reason: HOSPADM

## 2022-05-31 RX ORDER — HYDRALAZINE HYDROCHLORIDE 25 MG/1
25 TABLET, FILM COATED ORAL EVERY 8 HOURS PRN
Status: DISCONTINUED | OUTPATIENT
Start: 2022-05-31 | End: 2022-06-21 | Stop reason: HOSPADM

## 2022-05-31 RX ORDER — ACETAMINOPHEN 325 MG/1
650 TABLET ORAL EVERY 4 HOURS PRN
Status: DISCONTINUED | OUTPATIENT
Start: 2022-05-31 | End: 2022-06-21 | Stop reason: HOSPADM

## 2022-05-31 RX ORDER — MIDAZOLAM HYDROCHLORIDE 5 MG/ML
5 INJECTION INTRAMUSCULAR; INTRAVENOUS PRN
Status: DISCONTINUED | OUTPATIENT
Start: 2022-05-31 | End: 2022-06-21 | Stop reason: HOSPADM

## 2022-05-31 RX ORDER — ALLOPURINOL 100 MG/1
TABLET ORAL
Qty: 100 TABLET | Refills: 3 | Status: SHIPPED | OUTPATIENT
Start: 2022-05-31 | End: 2022-08-25

## 2022-05-31 RX ORDER — ALLOPURINOL 100 MG/1
100 TABLET ORAL
Status: CANCELLED | OUTPATIENT
Start: 2022-06-01

## 2022-05-31 RX ORDER — OMEPRAZOLE 20 MG/1
20 CAPSULE, DELAYED RELEASE ORAL DAILY
Status: DISCONTINUED | OUTPATIENT
Start: 2022-05-31 | End: 2022-06-21 | Stop reason: HOSPADM

## 2022-05-31 RX ORDER — ACETAMINOPHEN 325 MG/1
650 TABLET ORAL EVERY 6 HOURS PRN
Qty: 30 TABLET | Refills: 0 | Status: SHIPPED
Start: 2022-05-31

## 2022-05-31 RX ORDER — ECHINACEA PURPUREA EXTRACT 125 MG
2 TABLET ORAL PRN
Status: DISCONTINUED | OUTPATIENT
Start: 2022-05-31 | End: 2022-06-21 | Stop reason: HOSPADM

## 2022-05-31 RX ORDER — ONDANSETRON 2 MG/ML
4 INJECTION INTRAMUSCULAR; INTRAVENOUS 4 TIMES DAILY PRN
Status: DISCONTINUED | OUTPATIENT
Start: 2022-05-31 | End: 2022-06-21 | Stop reason: HOSPADM

## 2022-05-31 RX ORDER — POLYVINYL ALCOHOL 14 MG/ML
1 SOLUTION/ DROPS OPHTHALMIC PRN
Status: DISCONTINUED | OUTPATIENT
Start: 2022-05-31 | End: 2022-06-21 | Stop reason: HOSPADM

## 2022-05-31 RX ORDER — DEXTROSE MONOHYDRATE 25 G/50ML
25 INJECTION, SOLUTION INTRAVENOUS
Status: DISCONTINUED | OUTPATIENT
Start: 2022-05-31 | End: 2022-06-07

## 2022-05-31 RX ORDER — OMEPRAZOLE 20 MG/1
20 CAPSULE, DELAYED RELEASE ORAL DAILY
Status: DISCONTINUED | OUTPATIENT
Start: 2022-06-01 | End: 2022-05-31

## 2022-05-31 RX ORDER — AMOXICILLIN 250 MG
2 CAPSULE ORAL 2 TIMES DAILY
Status: DISCONTINUED | OUTPATIENT
Start: 2022-05-31 | End: 2022-06-07

## 2022-05-31 RX ORDER — LOSARTAN POTASSIUM 25 MG/1
50 TABLET ORAL 2 TIMES DAILY
Status: CANCELLED | OUTPATIENT
Start: 2022-05-31

## 2022-05-31 RX ORDER — TAMSULOSIN HYDROCHLORIDE 0.4 MG/1
0.4 CAPSULE ORAL DAILY
Qty: 30 CAPSULE | Status: ON HOLD
Start: 2022-06-01 | End: 2022-06-20 | Stop reason: SDUPTHER

## 2022-05-31 RX ORDER — CLOPIDOGREL BISULFATE 75 MG/1
75 TABLET ORAL
Status: CANCELLED | OUTPATIENT
Start: 2022-06-01

## 2022-05-31 RX ORDER — ATORVASTATIN CALCIUM 40 MG/1
80 TABLET, FILM COATED ORAL EVERY EVENING
Status: CANCELLED | OUTPATIENT
Start: 2022-05-31

## 2022-05-31 RX ORDER — OMEPRAZOLE 20 MG/1
20 CAPSULE, DELAYED RELEASE ORAL DAILY
Status: CANCELLED | OUTPATIENT
Start: 2022-06-01

## 2022-05-31 RX ORDER — LOSARTAN POTASSIUM 25 MG/1
50 TABLET ORAL 2 TIMES DAILY
Status: DISCONTINUED | OUTPATIENT
Start: 2022-05-31 | End: 2022-06-21 | Stop reason: HOSPADM

## 2022-05-31 RX ORDER — POLYETHYLENE GLYCOL 3350 17 G/17G
1 POWDER, FOR SOLUTION ORAL
Status: DISCONTINUED | OUTPATIENT
Start: 2022-05-31 | End: 2022-06-21 | Stop reason: HOSPADM

## 2022-05-31 RX ORDER — ACETAMINOPHEN 325 MG/1
650 TABLET ORAL EVERY 6 HOURS PRN
Status: CANCELLED | OUTPATIENT
Start: 2022-05-31

## 2022-05-31 RX ORDER — TAMSULOSIN HYDROCHLORIDE 0.4 MG/1
0.4 CAPSULE ORAL DAILY
Status: DISCONTINUED | OUTPATIENT
Start: 2022-06-01 | End: 2022-06-21 | Stop reason: HOSPADM

## 2022-05-31 RX ORDER — ATORVASTATIN CALCIUM 40 MG/1
80 TABLET, FILM COATED ORAL EVERY EVENING
Status: DISCONTINUED | OUTPATIENT
Start: 2022-05-31 | End: 2022-06-21 | Stop reason: HOSPADM

## 2022-05-31 RX ORDER — OXYCODONE HYDROCHLORIDE 5 MG/1
5 TABLET ORAL
Status: DISCONTINUED | OUTPATIENT
Start: 2022-05-31 | End: 2022-06-14

## 2022-05-31 RX ORDER — METOPROLOL SUCCINATE 100 MG/1
100 TABLET, EXTENDED RELEASE ORAL EVERY EVENING
Status: DISCONTINUED | OUTPATIENT
Start: 2022-05-31 | End: 2022-06-21 | Stop reason: HOSPADM

## 2022-05-31 RX ORDER — ACETAMINOPHEN 325 MG/1
650 TABLET ORAL EVERY 6 HOURS PRN
Status: DISCONTINUED | OUTPATIENT
Start: 2022-05-31 | End: 2022-06-18

## 2022-05-31 RX ORDER — METOPROLOL SUCCINATE 100 MG/1
100 TABLET, EXTENDED RELEASE ORAL EVERY EVENING
Status: CANCELLED | OUTPATIENT
Start: 2022-05-31

## 2022-05-31 RX ORDER — ALLOPURINOL 100 MG/1
100 TABLET ORAL
Status: DISCONTINUED | OUTPATIENT
Start: 2022-06-01 | End: 2022-06-21 | Stop reason: HOSPADM

## 2022-05-31 RX ORDER — FLUOXETINE HYDROCHLORIDE 20 MG/1
40 CAPSULE ORAL DAILY
Status: DISCONTINUED | OUTPATIENT
Start: 2022-06-01 | End: 2022-06-21 | Stop reason: HOSPADM

## 2022-05-31 RX ORDER — FLUOXETINE HYDROCHLORIDE 20 MG/1
40 CAPSULE ORAL DAILY
Status: CANCELLED | OUTPATIENT
Start: 2022-06-01

## 2022-05-31 RX ORDER — BISACODYL 10 MG
10 SUPPOSITORY, RECTAL RECTAL
Status: DISCONTINUED | OUTPATIENT
Start: 2022-05-31 | End: 2022-06-21 | Stop reason: HOSPADM

## 2022-05-31 RX ORDER — OXYCODONE HYDROCHLORIDE 10 MG/1
10 TABLET ORAL
Status: DISCONTINUED | OUTPATIENT
Start: 2022-05-31 | End: 2022-06-14

## 2022-05-31 RX ORDER — CEPHALEXIN 500 MG/1
1000 CAPSULE ORAL 4 TIMES DAILY
Qty: 40 CAPSULE | Refills: 0 | Status: ON HOLD
Start: 2022-05-31 | End: 2022-06-20

## 2022-05-31 RX ORDER — TAMSULOSIN HYDROCHLORIDE 0.4 MG/1
0.4 CAPSULE ORAL DAILY
Status: CANCELLED | OUTPATIENT
Start: 2022-06-01

## 2022-05-31 RX ORDER — ALUMINA, MAGNESIA, AND SIMETHICONE 2400; 2400; 240 MG/30ML; MG/30ML; MG/30ML
20 SUSPENSION ORAL
Status: DISCONTINUED | OUTPATIENT
Start: 2022-05-31 | End: 2022-06-21 | Stop reason: HOSPADM

## 2022-05-31 RX ADMIN — CEFAZOLIN 2 G: 2 INJECTION, POWDER, FOR SOLUTION INTRAMUSCULAR; INTRAVENOUS at 22:37

## 2022-05-31 RX ADMIN — TAMSULOSIN HYDROCHLORIDE 0.4 MG: 0.4 CAPSULE ORAL at 05:44

## 2022-05-31 RX ADMIN — OMEPRAZOLE 20 MG: 20 CAPSULE, DELAYED RELEASE ORAL at 05:44

## 2022-05-31 RX ADMIN — SODIUM CHLORIDE: 9 INJECTION, SOLUTION INTRAVENOUS at 05:48

## 2022-05-31 RX ADMIN — CEFAZOLIN 2 G: 2 INJECTION, POWDER, FOR SOLUTION INTRAMUSCULAR; INTRAVENOUS at 15:04

## 2022-05-31 RX ADMIN — LOSARTAN POTASSIUM 50 MG: 25 TABLET, FILM COATED ORAL at 22:26

## 2022-05-31 RX ADMIN — ATORVASTATIN CALCIUM 80 MG: 40 TABLET, FILM COATED ORAL at 22:26

## 2022-05-31 RX ADMIN — CEFAZOLIN 2 G: 2 INJECTION, POWDER, FOR SOLUTION INTRAMUSCULAR; INTRAVENOUS at 05:38

## 2022-05-31 RX ADMIN — ALLOPURINOL 100 MG: 100 TABLET ORAL at 05:44

## 2022-05-31 RX ADMIN — METOPROLOL SUCCINATE 100 MG: 100 TABLET, FILM COATED, EXTENDED RELEASE ORAL at 22:27

## 2022-05-31 RX ADMIN — INSULIN GLARGINE-YFGN 11 UNITS: 100 INJECTION, SOLUTION SUBCUTANEOUS at 22:30

## 2022-05-31 RX ADMIN — LOSARTAN POTASSIUM 50 MG: 25 TABLET, FILM COATED ORAL at 05:44

## 2022-05-31 RX ADMIN — FLUOXETINE 40 MG: 20 CAPSULE ORAL at 05:44

## 2022-05-31 RX ADMIN — TRAZODONE HYDROCHLORIDE 50 MG: 50 TABLET ORAL at 22:27

## 2022-05-31 RX ADMIN — RIVAROXABAN 10 MG: 10 TABLET, FILM COATED ORAL at 17:23

## 2022-05-31 RX ADMIN — CLOPIDOGREL BISULFATE 75 MG: 75 TABLET ORAL at 05:44

## 2022-05-31 ASSESSMENT — LIFESTYLE VARIABLES
ALCOHOL_USE: NO
TOTAL SCORE: 0
AVERAGE NUMBER OF DAYS PER WEEK YOU HAVE A DRINK CONTAINING ALCOHOL: 0
EVER FELT BAD OR GUILTY ABOUT YOUR DRINKING: NO
HAVE PEOPLE ANNOYED YOU BY CRITICIZING YOUR DRINKING: NO
TOTAL SCORE: 0
ON A TYPICAL DAY WHEN YOU DRINK ALCOHOL HOW MANY DRINKS DO YOU HAVE: 0
TOTAL SCORE: 0
EVER_SMOKED: NEVER
EVER HAD A DRINK FIRST THING IN THE MORNING TO STEADY YOUR NERVES TO GET RID OF A HANGOVER: NO
CONSUMPTION TOTAL: NEGATIVE
HAVE YOU EVER FELT YOU SHOULD CUT DOWN ON YOUR DRINKING: NO
HOW MANY TIMES IN THE PAST YEAR HAVE YOU HAD 5 OR MORE DRINKS IN A DAY: 0

## 2022-05-31 ASSESSMENT — FIBROSIS 4 INDEX: FIB4 SCORE: 0.89

## 2022-05-31 ASSESSMENT — PAIN DESCRIPTION - PAIN TYPE
TYPE: ACUTE PAIN
TYPE: ACUTE PAIN

## 2022-05-31 NOTE — CARE PLAN
The patient is Stable - Low risk of patient condition declining or worsening    Shift Goals  Clinical Goals:  (iv abx, safety)  Patient Goals:  (bg monitoring)    Progress made toward(s) clinical / shift goals:  bg wdl, denies pain, iv hydration     Patient is not progressing towards the following goals:

## 2022-05-31 NOTE — FLOWSHEET NOTE
05/31/22 1548   Patient History   Pulmonary Diagnosis None   Procedures Relevant to Respiratory Status None   Home O2 No   Nocturnal CPAP No   Home Treatments/Frequency No

## 2022-05-31 NOTE — FLOWSHEET NOTE
05/31/22 1550   Events/Summary/Plan   Events/Summary/Plan RT assessment   Vital Signs   Pulse 74   Respiration 18   Pulse Oximetry 97 %   $ Pulse Oximetry (Spot Check) Yes   Respiratory Assessment   Level of Consciousness Alert   Chest Exam   Work Of Breathing / Effort Within Normal Limits   Oxygen   O2 Delivery Device None - Room Air   Smoking History   Have you ever smoked Never

## 2022-05-31 NOTE — DISCHARGE PLANNING
Dr. Saleh has medically cleared.  Will discuss this case further with the Admissions Team this morning.     0938-In anticipation of Dr. Pratt accepting, I've arranged for transport via [facility] between [id number].  Dr. Saleh & Li YOO are aware.  Msg left for Usha, spouse.  Li will notify BSN.    2193-Usha, spouse is aware.    1048-T called and moved transport to [phone].  Msg placed to Dr. Saleh & Li YOO.

## 2022-05-31 NOTE — DISCHARGE PLANNING
Case Management Discharge Planning    Admission Date: 5/28/2022  GMLOS: 4.8  ALOS: 3    6-Clicks ADL Score: 12  6-Clicks Mobility Score: 6  PT and/or OT Eval ordered: Yes  Post-acute Referrals Ordered: Yes  Post-acute Choice Obtained: NA  Has referral(s) been sent to post-acute provider:  Yes      Anticipated Discharge Dispo: Discharge Disposition: D/T to home under [facility] care in anticipation of covered skilled care (06)    DME Needed: No    Action(s) Taken: [facility] Per Casa, pt accepted. Transport set up for [id number] via [facility] gur[facility]. RNCM notified pt's wife Usha via phone call.   COBRA completed and delivered to Kenia bedside RN at 1000    Escalations Completed: None    Medically Clear: Yes    Next Steps: Planned DC at [phone] via T gurney to [facility]    Barriers to Discharge: None    Is the patient up for discharge tomorrow: No, today         Home

## 2022-05-31 NOTE — DISCHARGE SUMMARY
Discharge Summary    CHIEF COMPLAINT ON ADMISSION  Chief Complaint   Patient presents with   • Weakness     Weakness. Taking antibiotics for UTI. Unable to eat.       Reason for Admission  UTI, Weakness, Diarrhea     Admission Date  5/28/2022    CODE STATUS  Full Code    HPI & HOSPITAL COURSE    Mr Av Bob is a 77-year-old male with past medical history hypertension, dyslipidemia, gout, T2DM, recurrent UTI's admitted/20/22 for his weakness and diarrhea.  Is associated worsening weakness, intermittent chills.  Is admitted with evidence of sepsis including leukocytosis 17,000, respiratory rate 24 positive for UTI and yeast.      As per patient's wife, Mr. Corea had acute stroke in the [place] around [place].  She stated he was doing well after the stroke and was able to recover function. Patient was able to still walk.  Shortly after he was diagnosed with non-Hodgkin's lymphoma.  Patient [place].  At some time patient suffered septic shock and was treated in the ICU in [place]. She reports he had Norovirus at that time.  It was after that time the patient began to show physical deterioration and debility.  He had ongoing infections subsequently and as per wife he never regained his previous physical abilities.      Patient has required urinary catheterization at home, was more frequent but currently now down to 1/day.  As per wife she makes attempts to keep them clean during the catheterization, uses soap and water.  Gloves and sterile lubricant.  Patient has followed with urology [place] with Dr. Barry.  Patient had recent TURP procedure for urinary retention.      He has had ongoing UTI since 2020.  Staph aureus pansensitive - 5/24/22 and 03/05/2021  Klebsiella pneumoniae pansensitive - 3/3/22, 6/25/20, 5/14/20   Proteus mirabilis - resistant to minocycline, nitrofurantoin - 11/20/2021 through 01/22/22    Patient now has grown out MSSA that is  pansensitive.  Patient was initially started on Bactrim however the patient developed diarrhea.  C. difficile was tested which is negative.  The patient was switched over to IV Ancef.  Since this is pansensitive the patient can be switched over to oral Augmentin.  Finish all 5 more days of antibiotics.  In the meantime patient is debilitated and will need continued physical therapy and Occupational Therapy given his previous history of stroke.  Patient at this point is recovering from sepsis quite nicely.  Blood pressure at this point is stabilized.  Diarrhea was second to Bactrim and C. difficile was ruled out.  Patient did have hypomagnesemia for which she was given magnesium supplementation.  Renal functions were monitored due to chronic renal disease.  He does have idiopathic gout of multiple sites and has been on allopurinol renally adjusted.  anticoagulation for DVT prophylaxis has been with Xarelto 10 mg daily.  This is a prophylactic dose only.  Patient's dehydration hydration status at this point has been stabilized.  Patient at this point is been accepted to [facility] rehab and can be transferred there safely this morning.    Therefore, he is discharged in good and stable condition to an inpatient rehabilitation hospital.    The patient met 2-midnight criteria for an inpatient stay at the time of discharge.    Discharge Date  5/31/2022    FOLLOW UP ITEMS POST DISCHARGE  Follow-up with the primary care physician after discharge from the rehab hospital  On transfer follow-up with physiatry    DISCHARGE DIAGNOSES  Principal Problem:    Recurrent UTI POA: Yes  Active Problems:    Idiopathic chronic gout of multiple sites without tophus POA: Yes    Stage 3b chronic kidney disease (HCC) POA: Yes      Overview:  Latest Reference Range & Units 04/29/22 11:14       Bun 8 - 22 mg/dL 33 (H)       Creatinine 0.50 - 1.40 mg/dL 1.55 (H)       GFR (CKD-EPI) >60 mL/min/1.73 m 2 46 ! [1]             He has a history of  chronic kidney disease related to nephrolithiasis,       recurrent UTIs, and BPH as well as history of hypertension and diabetes.        He is following with nephrology, Dr. Jarvis and urology, Dr. Barry.            Spironolactone was discontinued due to worsening chronic kidney disease.            Other current renally excreted medications include losartan 50 mg twice       daily, potassium daily, and Toujeo/Humalog.            Recent declining kidney function related to prostatitis and urinary       retention, slowly improving.    Type 2 diabetes mellitus, with long-term current use of insulin (HCC) POA: Yes      Overview:  Latest Reference Range & Units 05/18/22 14:58       Glycohemoglobin 0.0 - 5.6 % 6.6 !             Micro Alb Creat Ratio 0 - 30 mg/g 42 High                     Longstanding history of type 2 diabetes on insulin.  Previously followed       with endocrinology, Dr. Rahseed.             Current regimen includes Trulicity 3 mg weekly,Toujeo 5-12 units daily,       Humalog 5 units for sugars between 101-150, increase by 2 units if greater       than 150.  Correction dosage is 2: 50 greater than 150.            Complicated by retinal involvement, neuropathy, CKDIII-IV, and       microalbuminuria.          Hypomagnesemia POA: Yes      Overview: Etiology uncertain and noted at [facility] during acute hospital       stay in the summer of 2017    GERD with esophagitis POA: Yes    Diarrhea POA: Yes    Sepsis without acute organ dysfunction (HCC) POA: Yes    Essential hypertension, benign POA: Yes    UTI (urinary tract infection) POA: Yes    Dehydration POA: Yes    Hyponatremia POA: Yes  Resolved Problems:    * No resolved hospital problems. *      FOLLOW UP  Future Appointments   Date Time Provider Department Center   9/27/2022 11:40 AM Madison Bunch D.O. [facility] None   10/19/2022  1:00 PM Laly Jarvis M.D. [facility]   11/3/2022  3:20 PM Osvaldo Tucker M.D. [facility] None     No follow-up provider  specified.    MEDICATIONS ON DISCHARGE     Medication List      START taking these medications      Instructions   cephALEXin 500 MG Caps  Commonly known as: KEFLEX   Take 2 Capsules by mouth 4 times a day for 5 days.  Dose: 1,000 mg     insulin regular 100 Unit/mL Soln  Commonly known as: HumuLIN R   Inject 1-6 Units under the skin 4 Times a Day,Before Meals and at Bedtime.  Dose: 1-6 Units     rivaroxaban 10 MG Tabs tablet  Commonly known as: XARELTO   Take 1 Tablet by mouth every day at 6 PM.  Dose: 10 mg     tamsulosin 0.4 MG capsule  Start taking on: June 1, 2022  Commonly known as: FLOMAX  Replaces: alfuzosin 10 MG SR tablet   Take 1 Capsule by mouth every day.  Dose: 0.4 mg        CHANGE how you take these medications      Instructions   acetaminophen 325 MG Tabs  What changed:   · medication strength  · how much to take  · reasons to take this  Commonly known as: Tylenol   Take 2 Tablets by mouth every 6 hours as needed for Mild Pain, Moderate Pain or Fever.  Dose: 650 mg     allopurinol 100 MG Tabs  What changed: when to take this  Commonly known as: ZYLOPRIM   TAKE 1 TABLET BY MOUTH EVERY DAY     clopidogrel 75 MG Tabs  What changed: when to take this  Commonly known as: PLAVIX   Take 1 Tablet by mouth every day.  Dose: 75 mg     fenofibrate 145 MG Tabs  What changed: when to take this  Commonly known as: TRICOR   Take 1 Tablet by mouth every day.  Dose: 145 mg     fluoxetine 40 MG capsule  What changed: when to take this  Commonly known as: PROZAC   TAKE 1 CAPSULE BY MOUTH EVERY DAY     gabapentin 100 MG Caps  What changed:   · how much to take  · how to take this  · when to take this  Commonly known as: NEURONTIN   TAKE 1 TO 3 CAPSULES BY MOUTH AT BEDTIME AS NEEDED FOR PAIN     metoprolol  MG Tb24  What changed: when to take this  Commonly known as: TOPROL XL   Take 1 Tablet by mouth every day.  Dose: 100 mg        CONTINUE taking these medications      Instructions   atorvastatin 80 MG  tablet  Commonly known as: LIPITOR   Take 1 Tablet by mouth every evening.  Dose: 80 mg     losartan 50 MG Tabs  Commonly known as: COZAAR   Take 1 Tablet by mouth 2 times a day.  Dose: 50 mg     omeprazole 20 MG delayed-release capsule  Commonly known as: PRILOSEC   Take 20 mg by mouth every day.  Dose: 20 mg     ondansetron 4 MG Tbdp  Commonly known as: ZOFRAN ODT   Take 1 Tablet by mouth every 8 hours as needed for Nausea.  Dose: 4 mg     * Insulin Pen Needle 32 G x 4 mm      * Pen Needles 32G X 4 MM Misc   1 Each 5 Times a Day. USE FOR INSULIN SHOTS FIVE TIMES DAILY  Dose: 1 Each     Toujeo SoloStar 300 UNIT/ML Sopn  Generic drug: Insulin Glargine (1 Unit Dial)   Doctor's comments: DO NOT FILL_ INFORMATION ONLY  Inject 14 Units under the skin every day. Per wife Sliding Scale, per wife gave 22 units on 1/18/2022  Dose: 14 Units         * This list has 2 medication(s) that are the same as other medications prescribed for you. Read the directions carefully, and ask your doctor or other care provider to review them with you.            STOP taking these medications    alfuzosin 10 MG SR tablet  Commonly known as: UROXATRAL  Replaced by: tamsulosin 0.4 MG capsule     D3 2000 UNIT Tabs  Generic drug: Cholecalciferol     glucose blood strip     insulin lispro 100 UNIT/ML Sopn injection PEN  Commonly known as: HumaLOG,AdmeLOG     magnesium oxide 400 MG Tabs tablet  Commonly known as: MAG-OX     methenamine hip 1 GM Tabs  Commonly known as: HIPPREX     MULTI VITAMIN MENS PO     potassium chloride 8 MEQ tablet  Commonly known as: KLOR-CON     saccharomyces boulardii 250 MG Caps  Commonly known as: FLORASTOR     sulfamethoxazole-trimethoprim 800-160 MG tablet  Commonly known as: BACTRIM DS     Trulicity 3 MG/0.5ML Sopn  Generic drug: Dulaglutide            Allergies  Allergies   Allergen Reactions   • Diphenhydramine Anxiety     Pt is able to tolerate  Mg benadryl with less anxiety   • Diphenhydramine Hcl Anxiety     Pt  is able to tolerate  Mg benadryl with less anxiety   • Lorazepam Unspecified     Disorientation   • Ciprofloxacin      Rash,stomach ache   • Spironolactone      Acute kidney injury       DIET  Orders Placed This Encounter   Procedures   • Diet Order Diet: Cardiac; Second Modifier: (optional): Consistent CHO (Diabetic)     Standing Status:   Standing     Number of Occurrences:   1     Order Specific Question:   Diet:     Answer:   Cardiac [6]     Order Specific Question:   Second Modifier: (optional)     Answer:   Consistent CHO (Diabetic) [4]       ACTIVITY  As tolerated.  Weight bearing as tolerated    CONSULTATIONS  None    PROCEDURES  None    LABORATORY  Lab Results   Component Value Date    SODIUM 137 05/31/2022    POTASSIUM 4.0 05/31/2022    CHLORIDE 107 05/31/2022    CO2 19 (L) 05/31/2022    GLUCOSE 104 (H) 05/31/2022    BUN 21 05/31/2022    CREATININE 1.36 05/31/2022    CREATININE 1.4 05/28/2008        Lab Results   Component Value Date    WBC 16.1 (H) 05/31/2022    HEMOGLOBIN 12.1 (L) 05/31/2022    HEMATOCRIT 36.9 (L) 05/31/2022    PLATELETCT 420 05/31/2022        Total time of the discharge process exceeds 35 minutes.

## 2022-05-31 NOTE — DISCHARGE INSTRUCTIONS
Discharge Instructions    Discharged to other by [facility] with self. Discharged via wheelchair, hospital escort: Yes.  Special equipment needed: Not Applicable    Be sure to schedule a follow-up appointment with your primary care doctor or any specialists as instructed.     Discharge Plan:   Diet Plan: Discussed  Activity Level: Discussed  Confirmed Follow up Appointment: Patient to Call and Schedule Appointment  Confirmed Symptoms Management: Discussed  Medication Reconciliation Updated: Yes    I understand that a diet low in cholesterol, fat, and sodium is recommended for good health. Unless I have been given specific instructions below for another diet, I accept this instruction as my diet prescription.   Other diet: Cardiac/Diabetic    Special Instructions: None    Is patient discharged on Warfarin / Coumadin?   No     Depression / Suicide Risk    As you are discharged from this [facility], it is important to learn how to keep safe from harming yourself.    Recognize the warning signs:  Abrupt changes in personality, positive or negative- including increase in energy   Giving away possessions  Change in eating patterns- significant weight changes-  positive or negative  Change in sleeping patterns- unable to sleep or sleeping all the time   Unwillingness or inability to communicate  Depression  Unusual sadness, discouragement and loneliness  Talk of wanting to die  Neglect of personal appearance   Rebelliousness- reckless behavior  Withdrawal from people/activities they love  Confusion- inability to concentrate     If you or a loved one observes any of these behaviors or has concerns about self-harm, here's what you can do:  Talk about it- your feelings and reasons for harming yourself  Remove any means that you might use to hurt yourself (examples: pills, rope, extension cords, firearm)  Get professional help from the community (Mental Health, Substance Abuse, psychological counseling)  Do not be  alone:Call your Safe Contact- someone whom you trust who will be there for you.  Call your local CRISIS HOTLINE 972-0163 or 370-161-6692  Call your local Children's Mobile Crisis Response Team [place] (545) 412-0218 or www.Bracketr  Call the toll free National Suicide Prevention Hotlines   National Suicide Prevention Lifeline 081-674-XUYE (8102)  [facility] Line Network 800-SUICIDE (624-5852)    Acute Urinary Retention, Male    Acute urinary retention means that you cannot pee (urinate) at all, or that you pee too little and your bladder is not emptied completely. If it is not treated, it can lead to kidney damage or other serious problems.  Follow these instructions at home:  Take over-the-counter and prescription medicines only as told by your doctor. Ask your doctor what medicines you should stay away from. Do not take any medicine unless your doctor says it is okay to do so.  If you were sent home with a tube that drains the bladder (catheter), take care of it as told by your doctor.  Drink enough fluid to keep your pee clear or pale yellow.  If you were given an antibiotic, take it as told by your doctor. Do not stop taking the antibiotic even if you start to feel better.  Do not use any products that contain nicotine or tobacco, such as cigarettes and e-cigarettes. If you need help quitting, ask your doctor.  Watch for changes in your symptoms. Tell your doctor about them.  If told, track changes in your blood pressure at home. Tell your doctor about them.  Keep all follow-up visits as told by your doctor. This is important.  Contact a doctor if:  You have spasms or you leak pee when you have spasms.  Get help right away if:  You have chills or a fever.  You have a tube that drains the bladder and:  The tube stops draining pee.  The tube falls out.  You have blood in your pee.  Summary  Acute urinary retention means that you have problems peeing. It may mean that you cannot pee at all, or that  you pee too little.  If this condition is not treated, it can lead to kidney damage or other serious problems.  If you were sent home with a tube that drains the bladder, take care of it as told by your doctor.  Monitor any changes in your symptoms. Tell your doctor about any changes.  This information is not intended to replace advice given to you by your health care provider. Make sure you discuss any questions you have with your health care provider.  Document Released: 06/05/2009 Document Revised: 03/05/2020 Document Reviewed: 01/19/2018  Elsevier Patient Education © 2020 Elsevier Inc.

## 2022-06-01 ENCOUNTER — APPOINTMENT (OUTPATIENT)
Dept: RADIOLOGY | Facility: REHABILITATION | Age: 77
DRG: 092 | End: 2022-06-01
Attending: HOSPITALIST
Payer: MEDICARE

## 2022-06-01 PROBLEM — E55.9 VITAMIN D INSUFFICIENCY: Status: ACTIVE | Noted: 2022-06-01

## 2022-06-01 PROBLEM — D72.829 LEUKOCYTOSIS: Status: ACTIVE | Noted: 2022-06-01

## 2022-06-01 LAB
25(OH)D3 SERPL-MCNC: 29 NG/ML (ref 30–100)
ALBUMIN SERPL BCP-MCNC: 2.7 G/DL (ref 3.2–4.9)
ALBUMIN/GLOB SERPL: 1.1 G/DL
ALP SERPL-CCNC: 116 U/L (ref 30–99)
ALT SERPL-CCNC: 5 U/L (ref 2–50)
ANION GAP SERPL CALC-SCNC: 11 MMOL/L (ref 7–16)
APPEARANCE UR: ABNORMAL
AST SERPL-CCNC: 17 U/L (ref 12–45)
BACTERIA #/AREA URNS HPF: ABNORMAL /HPF
BASOPHILS # BLD AUTO: 0.4 % (ref 0–1.8)
BASOPHILS # BLD: 0.07 K/UL (ref 0–0.12)
BILIRUB SERPL-MCNC: 0.5 MG/DL (ref 0.1–1.5)
BILIRUB UR QL STRIP.AUTO: NEGATIVE
BUN SERPL-MCNC: 22 MG/DL (ref 8–22)
CALCIUM SERPL-MCNC: 8.5 MG/DL (ref 8.5–10.5)
CHLORIDE SERPL-SCNC: 106 MMOL/L (ref 96–112)
CO2 SERPL-SCNC: 20 MMOL/L (ref 20–33)
COLOR UR: YELLOW
CREAT SERPL-MCNC: 1.15 MG/DL (ref 0.5–1.4)
EOSINOPHIL # BLD AUTO: 0.51 K/UL (ref 0–0.51)
EOSINOPHIL NFR BLD: 3.1 % (ref 0–6.9)
EPI CELLS #/AREA URNS HPF: NEGATIVE /HPF
ERYTHROCYTE [DISTWIDTH] IN BLOOD BY AUTOMATED COUNT: 44.5 FL (ref 35.9–50)
EST. AVERAGE GLUCOSE BLD GHB EST-MCNC: 143 MG/DL
GFR SERPLBLD CREATININE-BSD FMLA CKD-EPI: 66 ML/MIN/1.73 M 2
GLOBULIN SER CALC-MCNC: 2.5 G/DL (ref 1.9–3.5)
GLUCOSE BLD STRIP.AUTO-MCNC: 130 MG/DL (ref 65–99)
GLUCOSE BLD STRIP.AUTO-MCNC: 185 MG/DL (ref 65–99)
GLUCOSE BLD STRIP.AUTO-MCNC: 91 MG/DL (ref 65–99)
GLUCOSE BLD STRIP.AUTO-MCNC: 95 MG/DL (ref 65–99)
GLUCOSE SERPL-MCNC: 96 MG/DL (ref 65–99)
GLUCOSE UR STRIP.AUTO-MCNC: NEGATIVE MG/DL
HBA1C MFR BLD: 6.6 % (ref 4–5.6)
HCT VFR BLD AUTO: 37.2 % (ref 42–52)
HGB BLD-MCNC: 12.2 G/DL (ref 14–18)
IMM GRANULOCYTES # BLD AUTO: 0.37 K/UL (ref 0–0.11)
IMM GRANULOCYTES NFR BLD AUTO: 2.2 % (ref 0–0.9)
KETONES UR STRIP.AUTO-MCNC: NEGATIVE MG/DL
LEUKOCYTE ESTERASE UR QL STRIP.AUTO: ABNORMAL
LYMPHOCYTES # BLD AUTO: 1.16 K/UL (ref 1–4.8)
LYMPHOCYTES NFR BLD: 7 % (ref 22–41)
MCH RBC QN AUTO: 28.2 PG (ref 27–33)
MCHC RBC AUTO-ENTMCNC: 32.8 G/DL (ref 33.7–35.3)
MCV RBC AUTO: 86.1 FL (ref 81.4–97.8)
MICRO URNS: ABNORMAL
MONOCYTES # BLD AUTO: 0.78 K/UL (ref 0–0.85)
MONOCYTES NFR BLD AUTO: 4.7 % (ref 0–13.4)
NEUTROPHILS # BLD AUTO: 13.6 K/UL (ref 1.82–7.42)
NEUTROPHILS NFR BLD: 82.6 % (ref 44–72)
NITRITE UR QL STRIP.AUTO: NEGATIVE
NRBC # BLD AUTO: 0 K/UL
NRBC BLD-RTO: 0 /100 WBC
PH UR STRIP.AUTO: 5.5 [PH] (ref 5–8)
PLATELET # BLD AUTO: 476 K/UL (ref 164–446)
PMV BLD AUTO: 9.6 FL (ref 9–12.9)
POTASSIUM SERPL-SCNC: 3.8 MMOL/L (ref 3.6–5.5)
PROT SERPL-MCNC: 5.2 G/DL (ref 6–8.2)
PROT UR QL STRIP: 100 MG/DL
RBC # BLD AUTO: 4.32 M/UL (ref 4.7–6.1)
RBC # URNS HPF: ABNORMAL /HPF
RBC UR QL AUTO: ABNORMAL
SODIUM SERPL-SCNC: 137 MMOL/L (ref 135–145)
SP GR UR STRIP.AUTO: 1.02
TSH SERPL DL<=0.005 MIU/L-ACNC: 1.56 UIU/ML (ref 0.38–5.33)
UROBILINOGEN UR STRIP.AUTO-MCNC: 0.2 MG/DL
WBC # BLD AUTO: 16.5 K/UL (ref 4.8–10.8)
WBC #/AREA URNS HPF: ABNORMAL /HPF
YEAST HYPHAE #/AREA URNS HPF: PRESENT /HPF

## 2022-06-01 PROCEDURE — 700105 HCHG RX REV CODE 258: Performed by: PHYSICAL MEDICINE & REHABILITATION

## 2022-06-01 PROCEDURE — 36415 COLL VENOUS BLD VENIPUNCTURE: CPT

## 2022-06-01 PROCEDURE — 84443 ASSAY THYROID STIM HORMONE: CPT

## 2022-06-01 PROCEDURE — 97530 THERAPEUTIC ACTIVITIES: CPT

## 2022-06-01 PROCEDURE — 99223 1ST HOSP IP/OBS HIGH 75: CPT | Performed by: HOSPITALIST

## 2022-06-01 PROCEDURE — 87086 URINE CULTURE/COLONY COUNT: CPT

## 2022-06-01 PROCEDURE — 71045 X-RAY EXAM CHEST 1 VIEW: CPT

## 2022-06-01 PROCEDURE — 81001 URINALYSIS AUTO W/SCOPE: CPT

## 2022-06-01 PROCEDURE — 97167 OT EVAL HIGH COMPLEX 60 MIN: CPT

## 2022-06-01 PROCEDURE — 700111 HCHG RX REV CODE 636 W/ 250 OVERRIDE (IP): Performed by: PHYSICAL MEDICINE & REHABILITATION

## 2022-06-01 PROCEDURE — 82962 GLUCOSE BLOOD TEST: CPT | Mod: 91

## 2022-06-01 PROCEDURE — 97602 WOUND(S) CARE NON-SELECTIVE: CPT

## 2022-06-01 PROCEDURE — 97162 PT EVAL MOD COMPLEX 30 MIN: CPT

## 2022-06-01 PROCEDURE — 700102 HCHG RX REV CODE 250 W/ 637 OVERRIDE(OP): Performed by: PHYSICAL MEDICINE & REHABILITATION

## 2022-06-01 PROCEDURE — 99233 SBSQ HOSP IP/OBS HIGH 50: CPT | Performed by: PHYSICAL MEDICINE & REHABILITATION

## 2022-06-01 PROCEDURE — 83036 HEMOGLOBIN GLYCOSYLATED A1C: CPT

## 2022-06-01 PROCEDURE — 97535 SELF CARE MNGMENT TRAINING: CPT

## 2022-06-01 PROCEDURE — 80053 COMPREHEN METABOLIC PANEL: CPT

## 2022-06-01 PROCEDURE — 770010 HCHG ROOM/CARE - REHAB SEMI PRIVAT*

## 2022-06-01 PROCEDURE — 82306 VITAMIN D 25 HYDROXY: CPT

## 2022-06-01 PROCEDURE — 92523 SPEECH SOUND LANG COMPREHEN: CPT

## 2022-06-01 PROCEDURE — 85025 COMPLETE CBC W/AUTO DIFF WBC: CPT

## 2022-06-01 PROCEDURE — A9270 NON-COVERED ITEM OR SERVICE: HCPCS | Performed by: PHYSICAL MEDICINE & REHABILITATION

## 2022-06-01 RX ORDER — VITAMIN B COMPLEX
1000 TABLET ORAL DAILY
Status: DISCONTINUED | OUTPATIENT
Start: 2022-06-02 | End: 2022-06-21 | Stop reason: HOSPADM

## 2022-06-01 RX ORDER — VITAMIN B COMPLEX
1000 TABLET ORAL DAILY
Status: DISCONTINUED | OUTPATIENT
Start: 2022-06-01 | End: 2022-06-01

## 2022-06-01 RX ORDER — MICONAZOLE NITRATE 20 MG/G
CREAM TOPICAL 2 TIMES DAILY
Status: COMPLETED | OUTPATIENT
Start: 2022-06-01 | End: 2022-06-07

## 2022-06-01 RX ADMIN — CEFAZOLIN 2 G: 2 INJECTION, POWDER, FOR SOLUTION INTRAMUSCULAR; INTRAVENOUS at 21:50

## 2022-06-01 RX ADMIN — INSULIN HUMAN 1 UNITS: 100 INJECTION, SOLUTION PARENTERAL at 11:11

## 2022-06-01 RX ADMIN — OMEPRAZOLE 20 MG: 20 CAPSULE, DELAYED RELEASE ORAL at 08:45

## 2022-06-01 RX ADMIN — MICONAZOLE NITRATE: 20 CREAM TOPICAL at 21:51

## 2022-06-01 RX ADMIN — INSULIN GLARGINE-YFGN 11 UNITS: 100 INJECTION, SOLUTION SUBCUTANEOUS at 21:06

## 2022-06-01 RX ADMIN — RIVAROXABAN 10 MG: 10 TABLET, FILM COATED ORAL at 17:19

## 2022-06-01 RX ADMIN — FLUOXETINE 40 MG: 20 CAPSULE ORAL at 08:45

## 2022-06-01 RX ADMIN — ALLOPURINOL 100 MG: 100 TABLET ORAL at 06:20

## 2022-06-01 RX ADMIN — CLOPIDOGREL 75 MG: 75 TABLET, FILM COATED ORAL at 06:20

## 2022-06-01 RX ADMIN — MICONAZOLE NITRATE: 20 CREAM TOPICAL at 09:00

## 2022-06-01 RX ADMIN — CEFAZOLIN 2 G: 2 INJECTION, POWDER, FOR SOLUTION INTRAMUSCULAR; INTRAVENOUS at 13:30

## 2022-06-01 RX ADMIN — LOSARTAN POTASSIUM 50 MG: 25 TABLET, FILM COATED ORAL at 20:57

## 2022-06-01 RX ADMIN — CEFAZOLIN 2 G: 2 INJECTION, POWDER, FOR SOLUTION INTRAMUSCULAR; INTRAVENOUS at 06:17

## 2022-06-01 RX ADMIN — TAMSULOSIN HYDROCHLORIDE 0.4 MG: 0.4 CAPSULE ORAL at 08:45

## 2022-06-01 RX ADMIN — METOPROLOL SUCCINATE 100 MG: 100 TABLET, FILM COATED, EXTENDED RELEASE ORAL at 20:56

## 2022-06-01 RX ADMIN — ATORVASTATIN CALCIUM 80 MG: 40 TABLET, FILM COATED ORAL at 20:57

## 2022-06-01 ASSESSMENT — BRIEF INTERVIEW FOR MENTAL STATUS (BIMS)
ASKED TO RECALL SOCK: NO, COULD NOT RECALL
ASKED TO RECALL BLUE: YES, AFTER CUEING (A COLOR")"
BIMS SUMMARY SCORE: 7
INITIAL REPETITION OF BED BLUE SOCK - FIRST ATTEMPT: 3
INITIAL REPETITION OF BED BLUE SOCK - FIRST ATTEMPT: 3
WHAT YEAR IS IT: CORRECT
ASKED TO RECALL BED: NO, COULD NOT RECALL
WHAT MONTH IS IT: MISSED BY MORE THAN 1 MONTH
ASKED TO RECALL BLUE: YES, AFTER CUEING (A COLOR")"
ASKED TO RECALL SOCK: NO, COULD NOT RECALL
WHAT DAY OF THE WEEK IS IT: INCORRECT
ASKED TO RECALL BED: NO, COULD NOT RECALL
WHAT DAY OF THE WEEK IS IT: INCORRECT
WHAT MONTH IS IT: MISSED BY MORE THAN 1 MONTH
BIMS SUMMARY SCORE: 7
WHAT YEAR IS IT: CORRECT

## 2022-06-01 ASSESSMENT — GAIT ASSESSMENTS: GAIT LEVEL OF ASSIST: UNABLE TO PARTICIPATE

## 2022-06-01 ASSESSMENT — ACTIVITIES OF DAILY LIVING (ADL)
BED_CHAIR_WHEELCHAIR_TRANSFER_DESCRIPTION: INCREASED TIME;INITIAL PREPARATION FOR TASK;SQUAT PIVOT TRANSFER TO WHEELCHAIR;SUPERVISION FOR SAFETY;VERBAL CUEING
TOILETING: REQUIRES ASSIST

## 2022-06-01 NOTE — ASSESSMENT & PLAN NOTE
Hba1c: 6.6 (6/1)  BS labile  Off Glargine (had recent lower BS)  Managing with SS coverage or now  Will change SS coverage from Regular to Lispro  Note: home meds include Trulicity 3 mg weekly, Toujeo 5-12 units daily, Humalog 5 units base and per SS  Cont to monitor

## 2022-06-01 NOTE — DOCUMENTATION QUERY
[facility]                                                                       Query Response Note      PATIENT:               ELIGIO MADRIGAL  ACCT #:                  [id number]  MRN:                     [id number]  :                      1945  ADMIT DATE:       2022 2:04 PM  DISCH DATE:        2022 12:24 PM  RESPONDING  PROVIDER #:        [id number]           QUERY TEXT:    Based on the clinical findings noted in this record, your clinical judgement, and after study can you please clarify the relationship, if any, between the UTI and the TURP?    The patient's Clinical Indicators include:  The documentation is this record note sepsis due to UTI in a patient with a significant history of UTI's and sepsis.  Currently on abx and latest cultures note to be positive for staph aureus and Klebsiella pneumoniae.  Zosyn initiated in the ED.  Admission for failed OP treatment (abx) failure to improve w/worsening weakness and diarrhea and chills w/o documented fever.  History of stroke w/left sided hemiparesis.  Does self catherization to urinate.  Started on Zosyn in the ED changed to cefazolin.    History of recent TURP.  Discussed with patient and wife possible sources if UTI including contamination from home self catheterization.    Risk related to ongoing UTI with resistance to antibiotic management, with significant co-morbid conditions.    Thank you,  Kaykay Landa, RN, BSN  Clinical    [facility]  Connect via [employer]  [phone]  [email or web address]  Options provided:   -- UTI is most likely related to the recent TURP}}  [[Condition UTI is due to or associated with self catheterization   -- UTI of unknown cause   -- Unrelated to each other   -- Unable to determine      Query created by: Kaykay Landa on 2022 5:32 PM    RESPONSE TEXT:    UTI is most likely related to the recent  TURP}} [[Condition UTI is due to or associated with self catheterization       QUERY TEXT:    Based on the clinical findings as noted do you feel that sepsis is a valid diagnosis in this patient after study?    Sepsis - real or suspected infection plus 2 or more SIRS criteria  Temp <96.8 or >101  HR >90  RR >20  WBC <4,000 or > 12,000  >10% bandemia         The patient's Clinical Indicators include:  77 year old male admitted for failed out patient antibiotic treatment for positive urine cultures (staph aureus and Klebsiella pneumonia).  Presented with complaints of weakness, chills, and  diarrhea and unable to eat.  He reports that he is not feeling any better despite being on oral antibiotics.    5/28/22:  T 96.9; WBC 15.9; Pulse 70; RR 14-24  5/29/22:  T 97.4; WBC 14.4; Pulse 76; RR 16  5/30/22:  T  98.5:  WBC 14.9; Pulse 81; RR 18  5/31/22:  T 98.3:  WBC 16.9:  Pulse 75:  RR 18  Antibiotic was changed to cefazolin on 5/30/22.  Patient reported feeling improved (5/30/22) with out any new complaints and was discharged to acute rehab for post stroke rehabilitation on 5/31/22.      Thank you,  Kaykay Landa, RN, BSN  Clinical    [facility] via [employer]  [phone]  [email or web address]  Options provided:   -- Patient with SIRS and UTI, sepsis is ruled out   -- Sepsis exists, (please document additional + SIRS criteria and clinical indicators)   -- Unable to determine      Query created by: Kaykay Landa on 5/31/2022 6:12 PM    RESPONSE TEXT:    Patient with SIRS and UTI, sepsis is ruled out          Electronically signed by:  AVELINO BLISS MD 6/1/2022 7:10 AM

## 2022-06-01 NOTE — CARE PLAN
Problem: Problem Solving STGs  Goal: STG-Within one week, patient will complete SCCAN with appropriate goals to follow   Outcome: Not Met  Note: Pt evaluated on 6/1, continue to target  Goal: STG-Within one week, patient will complete o-log with a final score of 25/30 over three consecutive days  Outcome: Not Met  Note: Pt evaluated on 6/1, continue to target     Problem: Memory STGs  Goal: STG-Within one week, patient will remember safety precautions related to hospitalization with 75% accuracy.   Outcome: Not Met  Note: Pt evaluated on 6/1, continue to target

## 2022-06-01 NOTE — CONSULTS
DATE OF SERVICE:  6/1/2022    REQUESTING PHYSICIAN:  Emile Pratt MD    CHIEF COMPLAINT / REASON FOR CONSULTATION:   Hypertension  Diabetes  Leukocytosis    HISTORY OF PRESENT ILLNESS:  This is a 77 y.o. 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  Patient reportedly has a significant history of UTIs and requires catheterization at home.  He was found to have elevated WBC and UTI positive for bacteria/yeast. He was started on IV antibiotics. Patient reportedly had his CVA in 2016 in [place] and then developed non-Hodgkin's Lymphoma.  He has had MSSA, klebsiella and proteus UTIs in the past and this stay he grew MSSA.  His antibiotics have been switched to Cefazolin as he was having worsening diarrhea.  C diff was negative.  Hospital course was complicated by hyperglycemia, leukocytosis, anemia, CATA and hyponatremia.      Because of the patient's weakness and debility, Rehab was consulted, evaluated the patient, and was deemed a good Rehab candidate.  The patient was transferred over to the Rehab facility on 5/31/2022.      The patient denies fever, chills, nausea, vomiting, headaches, blurry vision, or chest pain.    REVIEW OF SYSTEMS: All review of systems are negative pre AMA and CMS criteria except for that stated in the HPI.    PAST MEDICAL HISTORY:  Past Medical History:   Diagnosis Date   • (Formerly [facility]) Chronic ulcer of right lower extremity with fat layer exposed 12/27/2018   • Acute cystitis without hematuria 6/30/2019   • Acute on chronic renal failure (Formerly [facility]) 1/21/2020   • Acute respiratory failure with hypoxia (Formerly [facility]) 5/20/2017   • CAD (coronary artery disease)     GIOVANNA to RCA in '97, GIOVANNA X2 to LAD and GIOVANNA X2 to OM in '09   • Cancer (Formerly [facility])     2017; chemo lympoma non hodgkins lymphoma   • Cataract     dez iol   • Cerebrovascular accident (CVA) (Formerly [facility]) 12/30/2016    Left arm  weakness  etiology of stroke not established, lymphoma discovered on MRI evaluation of stroke, L hemiparesis much worse after acute infectious illness in mid 2017, but no specific diagnosed recurrent neurological etiology, all at [facility]   • Chickenpox    • CKD (chronic kidney disease) stage 3, GFR 30-59 ml/min ([facility])    • Controlled gout 2014   • Coronary atherosclerosis of native coronary artery     S/P PTCA (percutaneous transluminal coronary angioplasty), RCA, 5/1997, patent on cath 7/10/2009 at the time of interventions on his left anterior descending and circumflex coronary arteries   • Daytime sleepiness    • Depression    • Diabetes ([facility])    • Difficulty swallowing    • Enterococcal septicemia ([facility]) 8/12/2017   • Roberto hematuria 1/29/2020   • Frequent urination    • GERD (gastroesophageal reflux disease)    • Dutch measles    • History of renal calculi 11/19/2019   • Hydronephrosis 1/20/2020   • Hypertension 06/30/2021    pt states well controlled on meds   • Hypokalemia 2012    controlled with combination of ACE inhibitor or ARB plus spironolactone   • Hypomagnesemia 08/12/2017    etiology uncertain   • Influenza A 1/26/2020   • Insomnia    • Kidney stone    • Left hand weakness 5/20/2019   • Leg edema, right 1/22/2020   • Lymphoma ([facility]) 2/19/2017    Large cell   • Mixed hyperlipidemia    • Muscle strain of right gluteal region 5/20/2019   • Nephrolithiasis 2006    right kidney subsequent lithotripsy by Dr. Barry   • Normocytic hypochromic anemia 5/20/2017   • NSTEMI (non-ST elevated myocardial infarction) ([facility]) 07/18/2017    complicating UTI with sepsis   • Pain 06/30/2021    right leg foot   • Peripheral vascular disease ([facility])    • Polyneuropathy in diabetes(357.2) 9/11/2013   • Renal cyst 1/29/2020   • Renal mass 1/21/2020   • Septic shock ([facility]) 5/20/2017   • Skin ulcer of calf ([facility]) 2015    Right, Dr. Terry and wound care   • Stroke ([facility]) 2016    left sided weakness    • Urinary bladder disorder    • Urinary incontinence     pt wears pads   • Weakness    • Wound of left leg 2012    Requiring surgery and debridment, Dr. Moore       PAST SURGICAL HISTORY:  Past Surgical History:   Procedure Laterality Date   • [place] INJ LUMBAR/SACRAL,W/ IMAGING  7/27/2021    Procedure: Lumbar L5-S1 interlaminar epidural steroid injection;  Surgeon: Harpal Bennett M.D.;  Location: SURGERY REHAB PAIN [facility]NAGEMENT;  Service: Pain Management   • [place] UPPER GI ENDOSCOPY,DIAGNOSIS N/A 7/21/2021    Procedure: GASTROSCOPY - AND DILATION;  Surgeon: Neville Huerta M.D.;  Location: SURGERY SAME DAY [facility];  Service: Gastroenterology   • [place] UPPER GI ENDOSCOPY,BIOPSY N/A 7/21/2021    Procedure: GASTROSCOPY, WITH BIOPSY;  Surgeon: Neville Huerta M.D.;  Location: SURGERY SAME DAY [facility];  Service: Gastroenterology   • LUMBAR TRANSFORAMINAL EPIDURAL STEROID INJECTION Right 3/29/2021    Procedure: RIGHT L3-4 and L4-5 transforaminal epidural steroid injection with fluoroscopic guidance;  Surgeon: Harpal Bennett M.D.;  Location: SURGERY REHAB PAIN MANAGEMENT;  Service: Pain Management   • LUMBAR TRANSFORAMINAL EPIDURAL STEROID INJECTION Right 11/2/2020    Procedure: INJECTION, STEROID, SPINE, LUMBAR, EPIDURAL, TRANSFORAMINAL APPROACH;  Surgeon: Harpal Bennett M.D.;  Location: SURGERY REHAB PAIN MANAGEMENT;  Service: Pain Management   • CYSTOSCOPY STENT PLACEMENT Left 2/12/2018    Procedure: CYSTOSCOPY  ;  Surgeon: Rey Barry M.D.;  Location: SURGERY [facility];  Service: Urology   • URETEROSCOPY Left 2/12/2018    Procedure: URETEROSCOPY- FLEXIBLE  ;  Surgeon: Rey Barry M.D.;  Location: SURGERY [facility];  Service: Urology   • LASERTRIPSY Left 2/12/2018    Procedure: LASERTRIPSY - LITHO  ;  Surgeon: Rey Barry M.D.;  Location: SURGERY [facility];  Service: Urology   • WOUND CLOSURE GENERAL  4/3/2012    Performed by GERHARD MOORE at SURGERY SAME DAY [facility] ORS   • ZZZ CARDIAC CATH   2009    Stents to LAD, Om   • DAGOBERTO BY LAPAROSCOPY  1998   • ANGIOPLASTY  1997    RCA followed by other stents as noted above.    • LETITIA CARDIAC CATH  1997    stent RCA   • CATARACT EXTRACTION WITH IOL      bilateral   • LITHOTRIPSY     • TONSILLECTOMY     • TONSILLECTOMY AND ADENOIDECTOMY         Allergies   Allergen Reactions   • Diphenhydramine Anxiety     Pt is able to tolerate  Mg benadryl with less anxiety   • Diphenhydramine Hcl Anxiety     Pt is able to tolerate  Mg benadryl with less anxiety   • Lorazepam Unspecified     Disorientation   • Ciprofloxacin      Rash,stomach ache   • Spironolactone      Acute kidney injury       CURRENT MEDICATIONS:    Current Facility-Administered Medications:   •  miconazole 2%-zinc oxide  •  [START ON 6/2/2022] vitamin D3  •  Respiratory Therapy Consult  •  Pharmacy Consult Request  •  hydrALAZINE  •  acetaminophen  •  senna-docusate **AND** polyethylene glycol/lytes **AND** magnesium hydroxide **AND** bisacodyl  •  omeprazole  •  artificial tears  •  benzocaine-menthol  •  mag hydrox-al hydrox-simeth  •  ondansetron **OR** ondansetron  •  traZODone  •  sodium chloride  •  oxyCODONE immediate-release **OR** oxyCODONE immediate-release  •  midazolam  •  acetaminophen  •  allopurinol  •  atorvastatin  •  ceFAZolin  •  clopidogrel  •  FLUoxetine  •  gabapentin  •  insulin GLARGINE  •  insulin regular **AND** POC blood glucose manual result **AND** NOTIFY MD and PharmD **AND** Administer 20 grams of glucose (approximately 8 ounces of fruit juice) every 15 minutes PRN FSBG less than 70 mg/dL **AND** dextrose bolus  •  losartan  •  metoprolol SR  •  rivaroxaban  •  tamsulosin    Social History     Socioeconomic History   • Marital status:    Tobacco Use   • Smoking status: Never Smoker   • Smokeless tobacco: Never Used   Vaping Use   • Vaping Use: Never used   Substance and Sexual Activity   • Alcohol use: Never   • Drug use: Never   • Sexual activity: Not Currently      Partners: Female     Comment: , one daughter, 2 grands   Other Topics Concern   •  Service Yes   • Blood Transfusions No   • Caffeine Concern No   • Occupational Exposure No   • Hobby Hazards No   • Sleep Concern Yes   • Stress Concern No   • Weight Concern No   • Special Diet No   • Back Care No   • Exercise Yes   • Bike Helmet No   • Seat Belt Yes   • Self-Exams Yes   Social History Narrative    Av is from [place], [place] and raised in [place]. He has been in [place] since 2004. 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  He has been  to Usha since 2003, they met through a mutual friend. He has a daughter (Kalina) and a step son (Jimi).       FAMILY HISTORY:  was reviewed and is not pertinent to this consultation.    PHYSICAL EXAMINATION:  VITAL SIGNS:  Temp is 98.2, blood pressure is 106//82, heart rate is 67, respiratory rate is 18.  GENERAL:  Patient was lying in bed in no distress.  HEENT:  Pupils were equal, round and reactive to light and accomodation.  Oral mucosa was pink and moist.  NECK:  Soft.  Supple.  No JVD.  HEART:  Regular rate and rhythm.  Normal S1 and S2.  No murmurs were appreciated.  LUNGS:  Are clear to auscultation bilaterally.  ABDOMEN:  Soft, non tender, non distended.  Bowels sound were positive in all four quadrants.  EXTREMITIES:  No clubbing, cyanosis.  There was no lower extremity edema.  NEUROLOGIC:  Cranial nerves two through twelve were grossly intact.    LABS:  Lab Results   Component Value Date/Time    SODIUM 137 06/01/2022 06:30 AM    POTASSIUM 3.8 06/01/2022 06:30 AM    CHLORIDE 106 06/01/2022 06:30 AM    CO2 20 06/01/2022 06:30 AM    GLUCOSE 96 06/01/2022 06:30 AM    BUN 22 06/01/2022 06:30 AM    CREATININE 1.15  06/01/2022 06:30 AM    CREATININE 1.4 05/28/2008 05:42 PM    BUNCREATRAT 10 03/27/2017 02:11 PM      Lab Results   Component Value Date/Time    WBC 16.5 (H) 06/01/2022 06:30 AM    RBC 4.32 (L) 06/01/2022 06:30 AM    HEMOGLOBIN 12.2 (L) 06/01/2022 06:30 AM    HEMATOCRIT 37.2 (L) 06/01/2022 06:30 AM    MCV 86.1 06/01/2022 06:30 AM    MCH 28.2 06/01/2022 06:30 AM    MCHC 32.8 (L) 06/01/2022 06:30 AM    MPV 9.6 06/01/2022 06:30 AM    NEUTSPOLYS 82.60 (H) 06/01/2022 06:30 AM    LYMPHOCYTES 7.00 (L) 06/01/2022 06:30 AM    MONOCYTES 4.70 06/01/2022 06:30 AM    EOSINOPHILS 3.10 06/01/2022 06:30 AM    BASOPHILS 0.40 06/01/2022 06:30 AM    HYPOCHROMIA 1+ 03/21/2012 01:08 PM      Lab Results   Component Value Date/Time    PROTHROMBTM 13.6 01/19/2022 12:50 PM    INR 1.13 01/19/2022 12:50 PM        Benign prostatic hyperplasia with urinary obstruction  On Flomax    Essential hypertension, benign  BP a little labile with -159  BP better today  On Toprol XL  On Cozaar  Note also on FLomax  Cont to monitor    GERD with esophagitis  On Prilosec    Leukocytosis  WBC's have been elevated since 5/28  WBC's: 17.1 (5/28) --> 15.4 --> 14.9 --> 16.1 --> 16.5 (6/1)  Has been afebrile  Denies cough  Has some loose stools  Has hx of recurrent UTI's - see other assessment  Will check repeat U/A  Will get CXR  Will get Procalcitonin levels  Will check stool for C. DIff  Note: on Ancef for recent UTI  Cont to monitor    Recurrent UTI  Has hx of recurrent UTI  [place] [facility] [place], [place], and confusion  UC --> Staph Aureus (and U/A showed yeast)  On Ancef (thru 6/5)  Note: pt self caths at home    Vitamin D insufficiency  Vit D: 29  Will start supplements    Type 2 diabetes mellitus, with long-term current use of insulin ([facility])  Hba1c: 6.6 (6/1)  BS  (hit 185 x 1)  On Glargie 11 units qhs  Note: home meds include Trulicity 3 mg weekly, Toujeo 5-12 units daily, Humalog 5 units base and per SS  Cont to monitor      This case has  been discussed with the attending Physiatrist.    Thank you for the consultation.  Will follow the patient with you.

## 2022-06-01 NOTE — THERAPY
"Speech Language Pathology   Initial Assessment     Patient Name: Av Bob  AGE:  77 y.o., SEX:  male  Medical Record #: [id number]  Today's Date: 6/1/2022     Subjective    Patient participated in cognitive linguistic evaluation. Patient lives with spouse and reports that SO manages all IADL's and provided assistance with ADL's. Patient did not provide thorough responses to questions regarding cognitive baseline.   Coughing was observed during medication pass. Improvement noted with strategies provided, however delayed cough still noted. Discussed CSE orders with MD.      Objective       06/01/22 0832   Precautions   Precautions Fall Risk;Swallow Precautions ( See Comments)   Prior Living Situation   Prior Services Continuous (24 Hour) Care Giving Family   Housing / Facility 1 Story House   Lives with - Patient's Self Care Capacity Spouse   Prior Level Of Function   Communication Within Functional Limits   Swallow Within Functional Limits   Vision Wears Corrective Lenses   Patient's Primary Language English   Occupation (Pre-Hospital Vocational) Retired Due To Age   Brief Interview for Mental Status (BIMS)   Repetition of Three Words (First Attempt) 3   Temporal Orientation: Year Correct   Temporal Orientation: Month Missed by more than 1 month   Temporal Orientation: Day Incorrect   Recall: \"Sock\" No, could not recall   Recall: \"Blue\" Yes, after cueing (\"a color\")   Recall: \"Bed\" No, could not recall   BIMS Summary Score 7   Functional Level of Assist   Comprehension Moderate Assist   Comprehension Description Glasses;Verbal cues   Expression Minimal Assist   Social Interaction Supervision   Problem Solving Maximal Assist   Problem Solving Description Verbal cueing;Therapy schedule;Supervision;Increased time   Memory Maximal Assist   Memory Description Verbal cueing;Therapy schedule;Supervision;Increased time   Outcome Measures   Outcome Measures Utilized SCCAN   SCCAN (Scales of Cognitive and " Communicative Ability for Neurorehabilitation)   Oral Expression - Raw Score 9   Oral Expression - Scale Performance Score 47   Orientation - Raw Score 4   Orientation - Scale Performance Score 33   Memory - Raw Score 4   Memory - Scale Performance Score 21   Speech Comprehension - Raw Score 10   Speech Comprehension - Scale Performance Score 77   Current Discharge Plan   Current Discharge Plan Return to Prior Living Situation   Benefit   Therapy Benefit Patient would benefit from Inpatient Rehab Speech-Language Pathology to address above identified deficits.   Speech Language Pathologist Assigned   Assigned SLP / Extension CW 60 TDINE NO SPLIT   SLP Total Time Spent   SLP Individual Total Time Spent (Mins) 60   Evaluation Charges   SLP Speech Language Evaluation Speech Sound Language Comprehension       Assessment    Patient is 77 y.o. male with a diagnosis of acute encephalopathy.  Additional factors influencing patient status/progress (ie: cognitive factors, co-morbidities, social support, etc): PMH includes HLD, DM2, Gout, HTN, previous stroke with residual left weakness.     Cognitive linguistic evaluation was initiated. Patient participated in SCCAN, but was not completed d/t time constraints. Patient demonstrates slow processing speed and was not able to complete many items on subtests within allotted time frames. Difficulties noted with orientation, memory, and attention.  Plan to discuss cognitive baseline with spouse as patient was not able to answer many questions related to PLOF.     Plan  Recommend Speech Therapy 30-60 minutes per day 5-6 days per week for 1-2 weeks for the following treatments:  SLP Swallowing Dysfunction Treatment, SLP Oral Pharyngeal Evaluation, SLP Video Swallow Evaluation, SLP Self Care / ADL Training , SLP Cognitive Skill Development, and SLP Group Treatment.    Patient would benefit from clinical swallow evaluation.     Passport items to be completed:  Express basic needs,  understand food/liquid recommendations, consistently follow swallow precautions, manage finances, manage medications, arrive to therapy appointments on time, complete daily memory log entries, solve problems related to safety situations, review education related to hospitalization, complete caregiver training     Goals:  Long term and short term goals have been discussed with patient and they are in agreement.    Speech Therapy Problems (Active)       Problem: Memory STGs       Dates: Start: 06/01/22         Goal: STG-Within one week, patient will remember safety precautions related to hospitalization with 75% accuracy.        Dates: Start: 06/01/22               Problem: Problem Solving STGs       Dates: Start: 06/01/22         Goal: STG-Within one week, patient will complete SCCAN with appropriate goals to follow        Dates: Start: 06/01/22            Goal: STG-Within one week, patient will complete o-log with a final score of 25/30 over three consecutive days       Dates: Start: 06/01/22               Problem: Speech/Swallowing LTGs       Dates: Start: 06/01/22         Goal: LTG-By discharge, patient will solve basic problems in order to d/c home with SPV       Dates: Start: 06/01/22

## 2022-06-01 NOTE — PROGRESS NOTES
Received bedside shift report from Tiffanie MAHMOOD RN regarding patient and assumed care. Patient awake, calm and stable, currently positioned in bed for comfort and safety; call light within reach. Denies pain or discomfort at this time. Will continue to monitor.

## 2022-06-01 NOTE — H&P
REHABILITATION HISTORY AND PHYSICAL/POST ADMISSION PHYSICAL EVALUATION    Date of Admission: 5/31/2022  Av Bob  RH17/02    [facility] Code / Diagnosis to Support: 0002.1 - Brain Dysfunction: Non-Traumatic  Etiologic Diagnosis: Acute encephalopathy    CC: Decreased mobility, confusion    HPI:  Patient is a 77 y.o. 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 Patient reportedly has a significant history of UTIs and requires catheterization at home. He was found to have elevated WBC and UTI positive for bacteria/yeast. He was started on IV antibiotics. Patient reportedly had his CVA in 2016 in [place] and then developed non-Hodgkin's Lymphoma.  He has had MSSA, klebsiella and proteus UTIs in the past and this stay he grew MSSA. His antibiotics have been switched to Cefazolin as he was having worsening diarrhea. C diff was negative. Hospital course was complicated by hyperglycemia, leukocytosis, anemia, CATA and hyponatremia.      Patient was last evaluated on 5/30/22 with OT and was min-maxA for ADLs. Patient was last evaluated by PT on 5/30/22 and was min-maxA for mobility.  Patient was reportedly previously wheelchair bound with Lesia for transfer and mobility due to contractures of left side including left knee flexion contraction.     Patient tolerated transfer to [facility]. He reports he is doing OK. He reports confusion and poor memory of the first few days he was in the hospital. He asks to repeat information multiple times but uses humor and jokes to downplay his deficits. He reports weakness in bilateral LEs. He reports contracture at left ankle and spasticity to LUE. He follows with Dr. Huff and Dr. Bennett in the past. He has not had botox injection in > 6 months. Denies NVD. Denies dysuria. Denies fever or chills.     REVIEW OF SYSTEMS:     Comprehensive 14 point ROS was reviewed and all  were negative except as noted elsewhere in this document.     PMH:  Past Medical History:   Diagnosis Date   • ([facility]) Chronic ulcer of right lower extremity with fat layer exposed 12/27/2018   • Acute cystitis without hematuria 6/30/2019   • Acute on chronic renal failure ([facility]) 1/21/2020   • Acute respiratory failure with hypoxia ([facility]) 5/20/2017   • CAD (coronary artery disease)     GIOVANNA to RCA in '97, GIOVANNA X2 to LAD and GIOVANNA X2 to OM in '09   • Cancer ([facility])     2017; chemo lympoma non hodgkins lymphoma   • Cataract     dez iol   • Cerebrovascular accident (CVA) ([facility]) 12/30/2016    Left arm weakness  etiology of stroke not established, lymphoma discovered on MRI evaluation of stroke, L hemiparesis much worse after acute infectious illness in mid 2017, but no specific diagnosed recurrent neurological etiology, all at [facility]   • Chickenpox    • CKD (chronic kidney disease) stage 3, GFR 30-59 ml/min ([facility])    • Controlled gout 2014   • Coronary atherosclerosis of native coronary artery     S/P PTCA (percutaneous transluminal coronary angioplasty), RCA, 5/1997, patent on cath 7/10/2009 at the time of interventions on his left anterior descending and circumflex coronary arteries   • Daytime sleepiness    • Depression    • Diabetes ([facility])    • Difficulty swallowing    • Enterococcal septicemia ([facility]) 8/12/2017   • Roberto hematuria 1/29/2020   • Frequent urination    • GERD (gastroesophageal reflux disease)    • [place] measles    • History of renal calculi 11/19/2019   • Hydronephrosis 1/20/2020   • Hypertension 06/30/2021    pt states well controlled on meds   • Hypokalemia 2012    controlled with combination of ACE inhibitor or ARB plus spironolactone   • Hypomagnesemia 08/12/2017    etiology uncertain   • Influenza A 1/26/2020   • Insomnia    • Kidney stone    • Left hand weakness 5/20/2019   • Leg edema, right 1/22/2020   • Lymphoma ([facility]) 2/19/2017    Large cell   • Mixed hyperlipidemia     • Muscle strain of right gluteal region 5/20/2019   • Nephrolithiasis 2006    right kidney subsequent lithotripsy by Dr. Barry   • Normocytic hypochromic anemia 5/20/2017   • NSTEMI (non-ST elevated myocardial infarction) ([facility]) 07/18/2017    complicating UTI with sepsis   • Pain 06/30/2021    right leg foot   • Peripheral vascular disease ([facility])    • Polyneuropathy in diabetes(357.2) 9/11/2013   • Renal cyst 1/29/2020   • Renal mass 1/21/2020   • Septic shock ([facility]) 5/20/2017   • Skin ulcer of calf ([facility]) 2015    Right, Dr. Terry and wound care   • Stroke ([facility]) 2016    left sided weakness   • Urinary bladder disorder    • Urinary incontinence     pt wears pads   • Weakness    • Wound of left leg 2012    Requiring surgery and debridment, Dr. Moore       PSH:  Past Surgical History:   Procedure Laterality Date   • ME INJ LUMBAR/SACRAL,W/ IMAGING  7/27/2021    Procedure: Lumbar L5-S1 interlaminar epidural steroid injection;  Surgeon: Harpal Bennett M.D.;  Location: SURGERY REHAB PAIN MANAGEMENT;  Service: Pain Management   • ME UPPER GI ENDOSCOPY,DIAGNOSIS N/A 7/21/2021    Procedure: GASTROSCOPY - AND DILATION;  Surgeon: Neville Huerta M.D.;  Location: SURGERY SAME DAY [facility];  Service: Gastroenterology   • ME UPPER GI ENDOSCOPY,BIOPSY N/A 7/21/2021    Procedure: GASTROSCOPY, WITH BIOPSY;  Surgeon: Neville Huerta M.D.;  Location: SURGERY SAME DAY [facility];  Service: Gastroenterology   • LUMBAR TRANSFORAMINAL EPIDURAL STEROID INJECTION Right 3/29/2021    Procedure: RIGHT L3-4 and L4-5 transforaminal epidural steroid injection with fluoroscopic guidance;  Surgeon: Harpal Bennett M.D.;  Location: SURGERY REHAB PAIN MANAGEMENT;  Service: Pain Management   • LUMBAR TRANSFORAMINAL EPIDURAL STEROID INJECTION Right 11/2/2020    Procedure: INJECTION, STEROID, SPINE, LUMBAR, EPIDURAL, TRANSFORAMINAL APPROACH;  Surgeon: Harpal Bennett M.D.;  Location: SURGERY REHAB PAIN MANAGEMENT;  Service: Pain Management   •  CYSTOSCOPY STENT PLACEMENT Left 2/12/2018    Procedure: CYSTOSCOPY  ;  Surgeon: Rey Barry M.D.;  Location: SURGERY [facility];  Service: Urology   • URETEROSCOPY Left 2/12/2018    Procedure: URETEROSCOPY- FLEXIBLE  ;  Surgeon: Rey Barry M.D.;  Location: SURGERY [facility];  Service: Urology   • LASERTRIPSY Left 2/12/2018    Procedure: LASERTRIPSY - LITHO  ;  Surgeon: Rey Barry M.D.;  Location: SURGERY [facility];  Service: Urology   • WOUND CLOSURE GENERAL  4/3/2012    Performed by GERHARD GILBERT at SURGERY SAME DAY [facility] ORS   • Z CARDIAC CATH  2009    Stents to LAD, Om   • DAGOBERTO BY LAPAROSCOPY  1998   • ANGIOPLASTY  1997    RCA followed by other stents as noted above.    • ZZZ CARDIAC CATH  1997    stent RCA   • CATARACT EXTRACTION WITH IOL      bilateral   • LITHOTRIPSY     • TONSILLECTOMY     • TONSILLECTOMY AND ADENOIDECTOMY         FAMILY HISTORY:  Family History   Problem Relation Age of Onset   • Heart Disease Father         CAD   • Diabetes Father    • Cancer Mother    • Psychiatric Illness Mother         Depression   • Depression Mother    • Kidney stones Brother    • Heart Disease Brother    • Psychiatric Illness Brother         Depression   • Depression Brother    • Suicide Attempts Other    • Psychiatric Illness Other         autism         MEDICATIONS:  Current Facility-Administered Medications   Medication Dose   • Respiratory Therapy Consult     • Pharmacy Consult Request ...Pain Management Review 1 Each  1 Each   • hydrALAZINE (APRESOLINE) tablet 25 mg  25 mg   • acetaminophen (Tylenol) tablet 650 mg  650 mg   • senna-docusate (PERICOLACE or SENOKOT S) 8.6-50 MG per tablet 2 Tablet  2 Tablet    And   • polyethylene glycol/lytes (MIRALAX) PACKET 1 Packet  1 Packet    And   • magnesium hydroxide (MILK OF MAGNESIA) suspension 30 mL  30 mL    And   • bisacodyl (DULCOLAX) suppository 10 mg  10 mg   • omeprazole (PRILOSEC) capsule 20 mg  20 mg   • artificial tears  ophthalmic solution 1 Drop  1 Drop   • benzocaine-menthol (Cepacol) lozenge 1 Lozenge  1 Lozenge   • mag hydrox-al hydrox-simeth (MAALOX PLUS ES or MYLANTA DS) suspension 20 mL  20 mL   • ondansetron (ZOFRAN ODT) dispertab 4 mg  4 mg    Or   • ondansetron (ZOFRAN) syringe/vial injection 4 mg  4 mg   • traZODone (DESYREL) tablet 50 mg  50 mg   • sodium chloride (OCEAN) 0.65 % nasal spray 2 Spray  2 Spray   • oxyCODONE immediate-release (ROXICODONE) tablet 5 mg  5 mg    Or   • oxyCODONE immediate release (ROXICODONE) tablet 10 mg  10 mg   • midazolam (VERSED) 5 mg/mL (1 mL vial)  5 mg   • acetaminophen (Tylenol) tablet 650 mg  650 mg   • [START ON 6/1/2022] allopurinol (ZYLOPRIM) tablet 100 mg  100 mg   • atorvastatin (LIPITOR) tablet 80 mg  80 mg   • ceFAZolin (Ancef) 2 g in  mL IVPB  2 g   • [START ON 6/1/2022] clopidogrel (PLAVIX) tablet 75 mg  75 mg   • [START ON 6/1/2022] FLUoxetine (PROZAC) capsule 40 mg  40 mg   • gabapentin (NEURONTIN) capsule 100 mg  100 mg   • insulin GLARGINE (Lantus,Semglee) injection  11 Units   • insulin regular (HumuLIN R,NovoLIN R) injection  1-6 Units    And   • dextrose 50% (D50W) injection 25 g  25 g   • losartan (COZAAR) tablet 50 mg  50 mg   • metoprolol SR (TOPROL XL) tablet 100 mg  100 mg   • rivaroxaban (XARELTO) tablet 10 mg  10 mg   • [START ON 6/1/2022] tamsulosin (FLOMAX) capsule 0.4 mg  0.4 mg       ALLERGIES:  Diphenhydramine, Diphenhydramine hcl, Lorazepam, Ciprofloxacin, and Spironolactone    PSYCHOSOCIAL HISTORY:  Housing / Facility: 1 Story House  Steps Into Home: 0  Steps In Home: 0  Lives with - Patient's Self Care Capacity: Spouse  Equipment Owned: Wheelchair, Tub / Shower Seat, Grab Bar(s) In Tub / Shower, Grab Bar(s) By Toilet, Hand Held Shower     Prior Level of Function / Living Situation:   Physical Therapy: Prior Services: Intermittent Physical Support for ADL Per Family  Housing / Facility: 1 Story House  Steps Into Home: 0  Steps In Home: 0  Bathroom  Set up: Walk In Shower, Grab Bars, Shower Chair  Equipment Owned: Wheelchair, Tub / Shower Seat, Grab Bar(s) In Tub / Shower, Grab Bar(s) By Toilet, Hand Held Shower  Lives with - Patient's Self Care Capacity: Spouse  Bed Mobility: Independent  Transfer Status: Required Assist  Ambulation: Dependent  Assistive Devices Used: Wheelchair  Wheelchair: Independent  Current Level of Function:   Gait Level Of Assist: Unable to Participate  Weight Bearing Status: fwb  Supine to Sit: Moderate Assist  Scooting: Moderate Assist  Rolling: Minimal Assist to Rt., Minimum Assist to Lt.  Comments: up EOB with PT  Sit to Stand: Maximal Assist  Bed, Chair, Wheelchair Transfer: Maximal Assist (max x2)  Transfer Method: Stand Pivot  Sitting in Chair: 10mins; up in recliner post session  Sitting Edge of Bed: 5 mins  Standing: < 1 min x 3  Occupational Therapy:   Self Feeding: Requires Assist (set up)  Grooming / Hygiene: Requires Assist (setup)  Bathing: Requires Assist  Dressing: Requires Assist  Toileting: Requires Assist  Medication Management: Dependent  Laundry: Dependent  Kitchen Mobility: Dependent  Finances: Dependent  Home Management: Dependent  Shopping: Dependent  Prior Level Of Mobility: Uses Wheel Chair for in Home Mobility, Uses Wheel Chair for Community Mobility  Driving / Transportation: Relatives / Others Provide Transportation  Prior Services: Intermittent Physical Support for ADL Per Family  Housing / Facility: [address]  Occupation (Pre-Hospital Vocational): Retired Due To Age  Current Level of Function:   Eating: Supervision (setup)  Upper Body Dressing: Maximal Assist  Lower Body Dressing: Maximal Assist    CURRENT LEVEL OF FUNCTION:   Same as level of function prior to admission to [facility]    PHYSICAL EXAM:     VITAL SIGNS:   height is 1.829 m (6') and weight is 85.5 kg (188 lb 7.9 oz). His oral temperature is 36.7 °C (98 °F). His blood pressure is 152/87 (abnormal) and his pulse is 74.  His respiration is 18 and oxygen saturation is 97%.     GENERAL: No apparent distress  HEENT: Normocephalic/atraumatic, EOMI and PERRL  CARDIAC: Regular rate and rhythm, normal S1, S2   LUNGS: Clear to auscultation   ABDOMINAL: bowel sounds present, soft and nontender    EXTREMITIES: Left knee -20 degrees. Left arm with 2/4 MAS in all flexor groups  NEURO:  Mental status: AOx3, Forgetful, repeats questions, pleasant. Found incontinent  Speech: fluent, no aphasia or dysarthria  Motor:  Limited exam due to incontinence  At least 3/5 RLE, 2/5 LLE  Minimal movement noted of left hand (uses right to pick and to do the movements at elbow and shoulder)  Sensory: Reports subjective decrease to left  DTRs: 3+ in L vs 2+ in R patella    RADIOLOGY:                 No recent imaging.                                      LABS:  Recent Labs     05/29/22 0136 05/30/22  0045 05/31/22  0300   SODIUM 132* 135 137   POTASSIUM 3.9 3.7 4.0   CHLORIDE 103 103 107   CO2 18* 19* 19*   GLUCOSE 79 105* 104*   BUN 26* 22 21   CREATININE 1.52* 1.49* 1.36   CALCIUM 8.5 8.5 8.5     Recent Labs     05/29/22 0136 05/30/22  0045 05/31/22  0300   WBC 15.4* 14.9* 16.1*   RBC 3.98* 4.19* 4.21*   HEMOGLOBIN 11.4* 12.0* 12.1*   HEMATOCRIT 34.8* 36.5* 36.9*   MCV 87.4 87.1 87.6   MCH 28.6 28.6 28.7   MCHC 32.8* 32.9* 32.8*   RDW 44.2 44.7 45.3   PLATELETCT 341 395 420   MPV 10.0 9.9 9.5             PRIMARY REHAB DIAGNOSIS:    This patient is a 77 y.o. male admitted for acute inpatient rehabilitation with Acute encephalopathy.    IMPAIRMENTS:   Cognitive  ADLs/IADLs  Mobility    SECONDARY DIAGNOSIS/MEDICAL CO-MORBIDITIES AFFECTING FUNCTION:  Previous R CVA  HTN/CAD  HLD  Leukocytosis  Anemia  DM2 with hyperglycemia  Depression  Hx of Gout  Urinary Retention    RELEVANT CHANGES SINCE PREADMISSION EVALUATION:    Status unchanged    The patient's rehabilitation potential is Good  The patient's medical prognosis is fair    PLAN:   Discussion and  Recommendations:   1. The patient requires an acute inpatient rehabilitation program with a coordinated program of care at an intensity and frequency not available at a lower level of care. This recommendation is substantiated by the patient's medical physicians who recommend that the patient's intervention and assessment of medical issues needs to be done at an acute level of care for patient's safety and maximum outcome.   2. A coordinated program of care will be supplied by an interdisciplinary team of physical therapy, occupational therapy, rehab physician, rehab nursing, and, if needed, speech therapy and rehab psychology. Rehab team presents a patient-specific rehabilitation and education program concentrating on prevention of future problems related to accessibility, mobility, skin, bowel, bladder, sexuality, and psychosocial and medical/surgical problems.   3. Need for Rehabilitation Physician: The rehab physician will be evaluating the patient on a multi-weekly basis to help coordinate the program of care. The rehab physician communicates between medical physicians, therapists, and nurses to maximize the patient's potential outcome. Specific areas in which the rehab physician will be providing daily assessment include the following:   A. Assessing the patient's heart rate and blood pressure response (vitals monitoring) to activity and making adjustments in medications or conservative measures as needed.   B. The rehab physician will be assessing the frequency at which the program can be increased to allow the patient to reach optimal functional outcome.   C. The rehab physician will also provide assessments in daily skin care, especially in light of patient's impairments in mobility.   D. The rehab physician will provide special expertise in understanding how to work with functional impairment and recommend appropriate interventions, compensatory techniques, and education that will facilitate the patient's  outcome.   4. Rehab R.N.   The rehab RN will be working with patient to carry over in room mobility and activities of daily living when the patient is not in 3 hours of skilled therapy. Rehab nursing will be working in conjunction with rehab physician to address all the medical issues above and continue to assess laboratory work and discuss abnormalities with the treating physicians, assess vitals, and response to activity, and discuss and report abnormalities with the rehab physician. Rehab RN will also continue daily skin care, supervise bladder/bowel program, instruct in medication administration, and ensure patient safety.   5. Rehab Therapy: Therapies to treat at intensity and frequency of (may change after completion of evaluation by all therapeutic disciplines):       PT:  Physical therapy to address mobility, transfer, gait training and evaluation for adaptive equipment needs 1 hour/day at least 5 days/week for the duration of the ELOS (see below)       OT:  Occupational therapy to address ADLs, self-care, home management training, functional mobility/transfers and assistive device evaluation, and community re-integration 1  hour/day at least 5 days/week for the duration of the ELOS (see below).        ST/Dysphagia:  Speech therapy to address speech, language, and cognitive deficits as well as swallowing difficulties with retraining/dysphagia management and community re-integration with comprehension, expression, cognitive training 1  hour/day at least 5 days/week for the duration of the ELOS (see below).     Medical management / Rehabilitation Issues/ Adverse Potential as part of rehabilitation plan     REHABILITATION ISSUES/ADVERSE POTENTIAL:  1. Acute toxic encephalopathy - Patient with UTI with urosepsis s/p IV antibiotics. Patient has urinary retention and requires occasional catheterization putting him at risk for recurrent UTIs. With Decreased cognition, balance and strength in setting of previous  CVA. Patient demonstrates functional deficits in cognition, behavior, strength, balance, coordination, and ADL's. The patient requires therapy to correct these deficits prior to discharge. Patient is admitted to [facility] for comprehensive rehabilitation therapy, including physical, occupational and speech therapy.     Rehabilitation nursing monitors bowel and bladder control, educates on medication administration, co-morbidities and monitors patient safety.    2.  Neurostimulants: None at this time but continue to assess daily for need to initiate should status change.    3.  DVT prophylaxis:  Patient is on Xarelto for anticoagulation upon transfer. Encourage OOB. Monitor daily for signs and symptoms of DVT including but not limited to swelling and pain to prevent the development of DVT that may interfere with therapies.    4.  GI prophylaxis:  On prilosec to prevent gastritis/dyspepsia which may interfere with therapies.    5.  Pain: No issues with pain currently / Controlled with APAP/Tramadol    6.  Nutrition/Dysphagia: Dietician monitors nutrient intake, recommend supplements prn and provide nutrition education to pt/family to promote optimal nutrition for wound healing/recovery.     7.  Bladder/bowel:  Start bowel and bladder program as described below, to prevent constipation, urinary retention (which may lead to UTI), and urinary incontinence (which will impact upon pt's functional independence).   - Post void bladder scans, I&O cath for PVRs >400  - up to commode after meal     8.  Skin/dermal ulcer prophylaxis: Monitor for new skin conditions with q.2 h. turns as required to prevent the development of skin breakdown.     9.  Cognition/Behavior: As needed psychologist provides adjustment counseling to illness and psychosocial barriers that may be potential barriers to rehabilitation.     10. Respiratory therapy: RT performs O2 management prn, breathing retraining, pulmonary hygiene and  bronchospasm management prn to optimize participation in therapies.     MEDICAL CO-MORBIDITIES/ADVERSE POTENTIAL AFFECTING FUNCTION:  Acute toxic encephalopathy - Patient with UTI with urosepsis s/p IV antibiotics. Patient has urinary retention and requires occasional catheterization putting him at risk for recurrent UTIs. With Decreased cognition, balance and strength in setting of previous CVA  -PT and OT for mobility and ADLs  -SLP for cognition  -Follow-up with [facility]    Previous R CVA - Patient with contracture and spasticity on R side. On Plavix and statin    HTN/CAD - Patient on Plavix. Patient on Losartan 50 mg BID, Metoprolol 100 mg XL    HLD - Patient on Atorvastatin 80 mg QHS    Leukocytosis - On treatment for UTI. On Ancef with recommendation to switch to Augment. Will check CBC in AM before switch    Anemia - Check AM CBC    DM2 with hyperglycemia - Patient on Glargine 11 U and SSI    Depression - Patient on Fluoxetine 40 mg daily     Hx of Gout - Patient on Allopurinol 100 mg daily    Urinary Retention - Patient requiring catheterization ~1 time daily. Follows with Urology. Continue Flomax    DVT Ppx - Patient on Xarelto ppx dose.     I personally performed a complete drug regimen review and no potential clinically significant medication issues were identified.     Pt was seen today for 72 min, and entire time spent in face-to-face contact was >50% in counseling and coordination of care as detailed in A/P above.        GOALS/EXPECTED LEVEL OF FUNCTION BASED ON CURRENT MEDICAL AND FUNCTIONAL STATUS (may change based on patient's medical status and rate of impairment recovery):  Transfers:   Minimal Assistance  Mobility/Gait:   Minimal Assistance  ADL's:   Minimal Assistance  Cognition:  supervs    DISPOSITION: Discharge to pre-morbid independent living setting with the supportive care of patient's family.    ELOS: 10-17 days

## 2022-06-01 NOTE — PROGRESS NOTES
Pt arrived at [facility] from [facility] via stretcher transport. Dr. Minor to follow for diagnosis of UTI/Encephalopathy. Initial assessment initiated. Pt oriented to room and facility routine and safety measures; pt education binder provided and discussed. Pt A/O x 3, with memory loss Incontinent of bowel and bladder. 2 per assist for transfers. All wounds photographed and documented; photos uploaded to . Pt denied pain or discomfort at this time. Pt positioned for comfort in bed. Call light within reach, safety measures in place. Will continue to monitor.

## 2022-06-01 NOTE — ASSESSMENT & PLAN NOTE
Has hx of recurrent UTI  Was adm to [facility] for UTI, diarrhea, and confusion  S/P recent UTI, s/p abx  Note: pt self caths at home

## 2022-06-01 NOTE — DISCHARGE PLANNING
CASE MANAGEMENT INITIAL ASSESSMENT    Admit Date:  5/31/2022     Case Management has reviewed the medical chart and will meet with patient to discuss role of case management / discharge planning / team conference.     Patient is a  77 y.o. male transferred from [facility].    Attending physician: Dr. Pratt  PCP: Dr. Madison Bunch     Diagnosis: Acute encephalopathy [G93.40]    Co-morbidities:   Patient Active Problem List    Diagnosis Date Noted   • Vitamin D insufficiency 06/01/2022   • Leukocytosis 06/01/2022   • Acute encephalopathy 05/31/2022   • UTI (urinary tract infection) 05/28/2022   • Dehydration 05/28/2022   • Hyponatremia 05/28/2022   • Pyelonephritis 05/24/2022   • Projectile vomiting with nausea 05/24/2022   • Pressure injury of buttock, stage 1 02/17/2022   • Acute prostatitis 01/13/2022   • Exposure to SARS-associated coronavirus 10/13/2021   • Hordeolum externum of left lower eyelid 09/14/2021   • Toenail avulsion, initial encounter- left foot 3rd digit 07/12/2021   • Dysphagia 03/15/2021   • Right posterior hip pain and right leg cramping 09/09/2020   • ASHLEY (obstructive sleep apnea) 08/27/2020   • Low testosterone in male 07/06/2020   • Nocturnal hypoxemia 07/06/2020   • Chronic fatigue 06/09/2020   • Essential hypertension, benign 05/06/2020   • Polypharmacy 02/04/2020   • Benign prostatic hyperplasia with urinary obstruction 01/29/2020   • Altered mobility due to old stroke 01/22/2020   • Sepsis without acute organ dysfunction (HCC) 01/21/2020   • Normocytic anemia 01/21/2020   • Neurogenic bladder 11/19/2019   • PVD (peripheral vascular disease) ([facility]) 10/08/2019   • Diarrhea 07/02/2019   • Strain of flexor muscle of right hip 05/20/2019   • ([facility]) Tibial artery disease 12/27/2018   • GERD with esophagitis 10/19/2018   • Recurrent UTI 04/26/2018   • ([facility]) Left hemiparesis 12/27/2017   • ([facility]) Diabetic nephropathy associated with type 2 diabetes mellitus 11/30/2017   • Psychophysiological insomnia  11/21/2017   • (HCC) Moderate episode of recurrent major depressive disorder 11/07/2017   • Wheelchair dependent 11/07/2017   • Hypomagnesemia 08/12/2017   • Paroxysmal atrial fibrillation (HCC) 05/23/2017   • Iron deficiency anemia 05/20/2017   • H/O non-Hodgkin's lymphoma 05/08/2017   • (HCC) Hypertension associated with diabetes 03/22/2017   • Type 2 diabetes mellitus, with long-term current use of insulin ([facility]) 12/30/2016   • H/O Cerebrovascular accident (CVA) in adulthood 12/30/2016   • Coronary artery disease involving native coronary artery 12/30/2016   • Stage 3b chronic kidney disease ([facility]) 08/25/2016   • Idiopathic chronic gout of multiple sites without tophus 01/28/2014   • Presence of BMS and drug coated stents in LAD coronary artery 12/13/2011   • Presence of drug coated stents in left circumflex coronary artery 12/13/2011   • Status post percutaneous transluminal coronary angioplasty 12/13/2011   • ([facility]) Hyperlipidemia associated with type 2 diabetes mellitus 12/13/2011     Prior Living Situation:  Housing / Facility: [address]  Lives with - Patient's Self Care Capacity: Spouse    Prior Level of Function:  Medication Management: Requires Assist  Finances: Requires Assist  Home Management: Requires Assist  Shopping: Requires Assist  Prior Level Of Mobility: Unable to Determine At This Time  Driving / Transportation: Relatives / Others Provide Transportation    Support Systems:  Primary : Usha Bob (spouse) [phone]    Previous Services Utilized:   Equipment Owned: Front-Wheel Walker, Quad Cane, Wheelchair, Tub / Shower Seat, Grab Bar(s) In Tub / Shower, Grab Bar(s) By Toilet, Hand Held Shower  Prior Services: Continuous (24 Hour) Care Giving Family    Other Information:  Occupation (Pre-Hospital Vocational): Retired Due To Age  Primary Payor Source: Medicare A, Medicare B  Secondary Payor Source: Other (Comments) (anthmike)  Primary Care Practitioner : Mdaison Bunch    Patient  / Family Goal:  Patient / Family Goal: Case Management will discuss goals with patient    Plan:  1. Continue to follow patient through hospitalization and provide discharge planning in collaboration with patient, family, physicians and ancillary services.     2. Utilize community resources to ensure a safe discharge.

## 2022-06-01 NOTE — PROGRESS NOTES
4 Eyes Skin Assessment Completed by LETICIA Moreno and Pedro Liao RN.    Head WDL  Ears WDL  Nose WDL  Mouth WDL  Neck WDL  Breast/Chest WDL  Shoulder Blades WDL  Spine WDL  (R) Arm/Elbow/Hand WDL  (L) Arm/Elbow/Hand WDL - Contracture to left wrist/elbow/shoulder. Patient is able to curl fingers slightly  Abdomen WDL  Groin Redness and Blanching - moisture associated skin damage to liana-area, penis is red and excoriated, condom cath removed and brief applied with barrier paste.  Scrotum/Coccyx/Buttocks Redness, Non-Blanching and Excoriation, open area photographed, barrier cream applied. Waffle mattress in place.  (R) Leg Scab to knee  (L) Leg Scab to knee- contracture   (R) Heel/Foot/Toe Blanching  (L) Heel/Foot/Toe Blanching    Devices In Places Blood Pressure Cuff and Pulse Ox  Interventions In Place Waffle Overlay and Pillows  Possible Skin Injury Yes    Pictures Uploaded Into Epic Yes  Wound Consult Placed Yes  RN Wound Prevention Protocol Ordered Yes

## 2022-06-01 NOTE — PROGRESS NOTES
Rehab Progress Note     Encounter Date: 6/1/2022    CC: Decreased mobility, confusion    Interval Events (Subjective)  Patient sitting up in room. He reports therapy evaluations are going OK. Reviewed admission labs including worsening leukocytosis, mild anemia, and mild vitamin D deficiency. Discussed would consult hospitalist for assistance with leukocytosis and blood sugars.     Objective:  VITAL SIGNS: /80   Pulse 74   Temp 36.8 °C (98.2 °F) (Oral)   Resp 18   Ht 1.829 m (6')   Wt 85.5 kg (188 lb 7.9 oz)   SpO2 93%   BMI 25.56 kg/m²   Gen: NAD  Psych: Mood and affect appropriate  CV: RRR, no edema  Resp: CTAB, no upper airway sounds  Abd: NTND  Neuro: AOx3, left hemiparesis    Recent Results (from the past 72 hour(s))   POCT glucose device results    Collection Time: 05/29/22  4:59 PM   Result Value Ref Range    POC Glucose, Blood 135 (H) 65 - 99 mg/dL   POCT glucose device results    Collection Time: 05/29/22  8:58 PM   Result Value Ref Range    POC Glucose, Blood 135 (H) 65 - 99 mg/dL   Renal Function Panel    Collection Time: 05/30/22 12:45 AM   Result Value Ref Range    Sodium 135 135 - 145 mmol/L    Potassium 3.7 3.6 - 5.5 mmol/L    Chloride 103 96 - 112 mmol/L    Co2 19 (L) 20 - 33 mmol/L    Glucose 105 (H) 65 - 99 mg/dL    Creatinine 1.49 (H) 0.50 - 1.40 mg/dL    Bun 22 8 - 22 mg/dL    Calcium 8.5 8.4 - 10.2 mg/dL    Phosphorus 2.6 2.5 - 4.5 mg/dL    Albumin 2.5 (L) 3.2 - 4.9 g/dL   MAGNESIUM    Collection Time: 05/30/22 12:45 AM   Result Value Ref Range    Magnesium 1.5 1.5 - 2.5 mg/dL   CBC WITHOUT DIFFERENTIAL    Collection Time: 05/30/22 12:45 AM   Result Value Ref Range    WBC 14.9 (H) 4.8 - 10.8 K/uL    RBC 4.19 (L) 4.70 - 6.10 M/uL    Hemoglobin 12.0 (L) 14.0 - 18.0 g/dL    Hematocrit 36.5 (L) 42.0 - 52.0 %    MCV 87.1 81.4 - 97.8 fL    MCH 28.6 27.0 - 33.0 pg    MCHC 32.9 (L) 33.7 - 35.3 g/dL    RDW 44.7 35.9 - 50.0 fL    Platelet Count 395 164 - 446 K/uL    MPV 9.9 9.0 - 12.9 fL    ESTIMATED GFR    Collection Time: 05/30/22 12:45 AM   Result Value Ref Range    GFR (CKD-EPI) 49 (A) >60 mL/min/1.73 m 2   CREATINE KINASE    Collection Time: 05/30/22 12:45 AM   Result Value Ref Range    CPK Total 33 0 - 154 U/L   POCT glucose device results    Collection Time: 05/30/22  5:57 AM   Result Value Ref Range    POC Glucose, Blood 113 (H) 65 - 99 mg/dL   POCT glucose device results    Collection Time: 05/30/22 11:28 AM   Result Value Ref Range    POC Glucose, Blood 144 (H) 65 - 99 mg/dL   POCT glucose device results    Collection Time: 05/30/22  4:44 PM   Result Value Ref Range    POC Glucose, Blood 119 (H) 65 - 99 mg/dL   POCT glucose device results    Collection Time: 05/30/22  9:02 PM   Result Value Ref Range    POC Glucose, Blood 155 (H) 65 - 99 mg/dL   Renal Function Panel    Collection Time: 05/31/22  3:00 AM   Result Value Ref Range    Sodium 137 135 - 145 mmol/L    Potassium 4.0 3.6 - 5.5 mmol/L    Chloride 107 96 - 112 mmol/L    Co2 19 (L) 20 - 33 mmol/L    Glucose 104 (H) 65 - 99 mg/dL    Creatinine 1.36 0.50 - 1.40 mg/dL    Bun 21 8 - 22 mg/dL    Calcium 8.5 8.4 - 10.2 mg/dL    Phosphorus 2.6 2.5 - 4.5 mg/dL    Albumin 2.4 (L) 3.2 - 4.9 g/dL   MAGNESIUM    Collection Time: 05/31/22  3:00 AM   Result Value Ref Range    Magnesium 1.6 1.5 - 2.5 mg/dL   CBC WITHOUT DIFFERENTIAL    Collection Time: 05/31/22  3:00 AM   Result Value Ref Range    WBC 16.1 (H) 4.8 - 10.8 K/uL    RBC 4.21 (L) 4.70 - 6.10 M/uL    Hemoglobin 12.1 (L) 14.0 - 18.0 g/dL    Hematocrit 36.9 (L) 42.0 - 52.0 %    MCV 87.6 81.4 - 97.8 fL    MCH 28.7 27.0 - 33.0 pg    MCHC 32.8 (L) 33.7 - 35.3 g/dL    RDW 45.3 35.9 - 50.0 fL    Platelet Count 420 164 - 446 K/uL    MPV 9.5 9.0 - 12.9 fL   ESTIMATED GFR    Collection Time: 05/31/22  3:00 AM   Result Value Ref Range    GFR (CKD-EPI) 54 (A) >60 mL/min/1.73 m 2   POCT glucose device results    Collection Time: 05/31/22  5:43 AM   Result Value Ref Range    POC Glucose, Blood 118 (H)  65 - 99 mg/dL   POCT glucose device results    Collection Time: 05/31/22 11:57 AM   Result Value Ref Range    POC Glucose, Blood 101 (H) 65 - 99 mg/dL   COV-2, FLU A/B, AND RSV BY PCR (2-4 HOURS CEPHEID): Collect NP swab in VTM    Collection Time: 05/31/22  1:00 PM    Specimen: Respirate   Result Value Ref Range    Influenza virus A RNA Negative Negative    Influenza virus B, PCR Negative Negative    RSV, PCR Negative Negative    SARS-CoV-2 by PCR NotDetected     SARS-CoV-2 Source NP Swab    POCT glucose device results    Collection Time: 05/31/22  5:22 PM   Result Value Ref Range    POC Glucose, Blood 113 (H) 65 - 99 mg/dL   POCT glucose device results    Collection Time: 05/31/22 10:25 PM   Result Value Ref Range    POC Glucose, Blood 150 (H) 65 - 99 mg/dL   CBC with Differential    Collection Time: 06/01/22  6:30 AM   Result Value Ref Range    WBC 16.5 (H) 4.8 - 10.8 K/uL    RBC 4.32 (L) 4.70 - 6.10 M/uL    Hemoglobin 12.2 (L) 14.0 - 18.0 g/dL    Hematocrit 37.2 (L) 42.0 - 52.0 %    MCV 86.1 81.4 - 97.8 fL    MCH 28.2 27.0 - 33.0 pg    MCHC 32.8 (L) 33.7 - 35.3 g/dL    RDW 44.5 35.9 - 50.0 fL    Platelet Count 476 (H) 164 - 446 K/uL    MPV 9.6 9.0 - 12.9 fL    Neutrophils-Polys 82.60 (H) 44.00 - 72.00 %    Lymphocytes 7.00 (L) 22.00 - 41.00 %    Monocytes 4.70 0.00 - 13.40 %    Eosinophils 3.10 0.00 - 6.90 %    Basophils 0.40 0.00 - 1.80 %    Immature Granulocytes 2.20 (H) 0.00 - 0.90 %    Nucleated RBC 0.00 /100 WBC    Neutrophils (Absolute) 13.60 (H) 1.82 - 7.42 K/uL    Lymphs (Absolute) 1.16 1.00 - 4.80 K/uL    Monos (Absolute) 0.78 0.00 - 0.85 K/uL    Eos (Absolute) 0.51 0.00 - 0.51 K/uL    Baso (Absolute) 0.07 0.00 - 0.12 K/uL    Immature Granulocytes (abs) 0.37 (H) 0.00 - 0.11 K/uL    NRBC (Absolute) 0.00 K/uL   Comp Metabolic Panel (CMP)    Collection Time: 06/01/22  6:30 AM   Result Value Ref Range    Sodium 137 135 - 145 mmol/L    Potassium 3.8 3.6 - 5.5 mmol/L    Chloride 106 96 - 112 mmol/L    Co2 20  20 - 33 mmol/L    Anion Gap 11.0 7.0 - 16.0    Glucose 96 65 - 99 mg/dL    Bun 22 8 - 22 mg/dL    Creatinine 1.15 0.50 - 1.40 mg/dL    Calcium 8.5 8.5 - 10.5 mg/dL    AST(SGOT) 17 12 - 45 U/L    ALT(SGPT) 5 2 - 50 U/L    Alkaline Phosphatase 116 (H) 30 - 99 U/L    Total Bilirubin 0.5 0.1 - 1.5 mg/dL    Albumin 2.7 (L) 3.2 - 4.9 g/dL    Total Protein 5.2 (L) 6.0 - 8.2 g/dL    Globulin 2.5 1.9 - 3.5 g/dL    A-G Ratio 1.1 g/dL   HEMOGLOBIN A1C    Collection Time: 06/01/22  6:30 AM   Result Value Ref Range    Glycohemoglobin 6.6 (H) 4.0 - 5.6 %    Est Avg Glucose 143 mg/dL   TSH with Reflex to FT4    Collection Time: 06/01/22  6:30 AM   Result Value Ref Range    TSH 1.560 0.380 - 5.330 uIU/mL   Vitamin D, 25-hydroxy (blood)    Collection Time: 06/01/22  6:30 AM   Result Value Ref Range    25-Hydroxy   Vitamin D 25 29 (L) 30 - 100 ng/mL   ESTIMATED GFR    Collection Time: 06/01/22  6:30 AM   Result Value Ref Range    GFR (CKD-EPI) 66 >60 mL/min/1.73 m 2   POCT glucose device results    Collection Time: 06/01/22  7:40 AM   Result Value Ref Range    POC Glucose, Blood 95 65 - 99 mg/dL   POCT glucose device results    Collection Time: 06/01/22 11:08 AM   Result Value Ref Range    POC Glucose, Blood 185 (H) 65 - 99 mg/dL       Current Facility-Administered Medications   Medication Frequency   • miconazole 2%-zinc oxide (Vinny) topical cream BID   • Respiratory Therapy Consult Continuous RT   • Pharmacy Consult Request ...Pain Management Review 1 Each PHARMACY TO DOSE   • hydrALAZINE (APRESOLINE) tablet 25 mg Q8HRS PRN   • acetaminophen (Tylenol) tablet 650 mg Q4HRS PRN   • senna-docusate (PERICOLACE or SENOKOT S) 8.6-50 MG per tablet 2 Tablet BID    And   • polyethylene glycol/lytes (MIRALAX) PACKET 1 Packet QDAY PRN    And   • magnesium hydroxide (MILK OF MAGNESIA) suspension 30 mL QDAY PRN    And   • bisacodyl (DULCOLAX) suppository 10 mg QDAY PRN   • omeprazole (PRILOSEC) capsule 20 mg DAILY   • artificial tears ophthalmic  solution 1 Drop PRN   • benzocaine-menthol (Cepacol) lozenge 1 Lozenge Q2HRS PRN   • mag hydrox-al hydrox-simeth (MAALOX PLUS ES or MYLANTA DS) suspension 20 mL Q2HRS PRN   • ondansetron (ZOFRAN ODT) dispertab 4 mg 4X/DAY PRN    Or   • ondansetron (ZOFRAN) syringe/vial injection 4 mg 4X/DAY PRN   • traZODone (DESYREL) tablet 50 mg QHS PRN   • sodium chloride (OCEAN) 0.65 % nasal spray 2 Spray PRN   • oxyCODONE immediate-release (ROXICODONE) tablet 5 mg Q3HRS PRN    Or   • oxyCODONE immediate release (ROXICODONE) tablet 10 mg Q3HRS PRN   • midazolam (VERSED) 5 mg/mL (1 mL vial) PRN   • acetaminophen (Tylenol) tablet 650 mg Q6HRS PRN   • allopurinol (ZYLOPRIM) tablet 100 mg QDAY   • atorvastatin (LIPITOR) tablet 80 mg Q EVENING   • ceFAZolin (Ancef) 2 g in  mL IVPB Q8HRS   • clopidogrel (PLAVIX) tablet 75 mg QDAY   • FLUoxetine (PROZAC) capsule 40 mg DAILY   • gabapentin (NEURONTIN) capsule 100 mg QHS PRN   • insulin GLARGINE (Lantus,Semglee) injection Q EVENING   • insulin regular (HumuLIN R,NovoLIN R) injection 4X/DAY ACHS    And   • dextrose 50% (D50W) injection 25 g Q15 MIN PRN   • losartan (COZAAR) tablet 50 mg BID   • metoprolol SR (TOPROL XL) tablet 100 mg Q EVENING   • rivaroxaban (XARELTO) tablet 10 mg DAILY AT 1800   • tamsulosin (FLOMAX) capsule 0.4 mg DAILY       Orders Placed This Encounter   Procedures   • Diet Order Diet: Cardiac; Second Modifier: (optional): Consistent CHO (Diabetic)     Standing Status:   Standing     Number of Occurrences:   1     Order Specific Question:   Diet:     Answer:   Cardiac [6]     Order Specific Question:   Second Modifier: (optional)     Answer:   Consistent CHO (Diabetic) [4]       Assessment:  Active Hospital Problems    Diagnosis    • *Acute encephalopathy    • Hyponatremia    • Dehydration    • Essential hypertension, benign    • Benign prostatic hyperplasia with urinary obstruction    • Diarrhea    • GERD with esophagitis    • Recurrent UTI    • Hypomagnesemia     • Type 2 diabetes mellitus, with long-term current use of insulin (HCC)    • Stage 3b chronic kidney disease (HCC)        Medical Decision Making and Plan:  Acute toxic encephalopathy - Patient with UTI with urosepsis s/p IV antibiotics. Patient has urinary retention and requires occasional catheterization putting him at risk for recurrent UTIs. 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 On Plavix and statin     HTN/CAD - Patient on Plavix. Patient on Losartan 50 mg BID, Metoprolol 100 mg XL  -Consult Hospitalist     HLD - Patient on Atorvastatin 80 mg QHS     Leukocytosis - On treatment for UTI. On Ancef with recommendation to switch to Augment. Will check CBC in AM before switch  -Repeat 16.5 up from 16.1, consult Hospitalist     Anemia - Check AM CBC - 12.2      DM2 with hyperglycemia - Patient on Glargine 11 U and SSI  -Consult hospitalist     Depression - Patient on Fluoxetine 40 mg daily      Hx of Gout - Patient on Allopurinol 100 mg daily     Urinary Retention - Patient requiring catheterization ~1 time daily. Follows with Urology. Continue Flomax     Vitamin D Deficiency - 29 on admission. Start 1000 U     DVT Ppx - Patient on Xarelto ppx dose.     Total time:  36 minutes.  I spent greater than 50% of the time for patient care and coordination on this date, including unit/floor time, and face-to-face time with the patient as per assessment and plan above.  Discussion included admission labs, vitamin D deficiency, worsening leukocytosis, ongoing confusion, and continue IV abx. Patient's case was discussed face to face with Hospitalist on [facility] floor.     Katheryn Pratt M.D.

## 2022-06-01 NOTE — THERAPY
"Occupational Therapy   Initial Evaluation     Patient Name: Av Bob  Age:  77 y.o., Sex:  male  Medical Record #: [id number]  Today's Date: 6/1/2022     Subjective    Pt reports his wife helps him with \"everything\" at home.      Objective       06/01/22 0701   Prior Living Situation   Prior Services Continuous (24 Hour) Care Giving Family   Housing / Facility 1 Story House   Steps Into Home 0   Steps In Home 0   Bathroom Set up Walk In Shower;Grab Bars;Shower Chair   Equipment Owned Grab Bar(s) By Toilet;Grab Bar(s) In Tub / Shower;Tub / Shower Seat;Wheelchair   Lives with - Patient's Self Care Capacity Spouse   Prior Level of ADL Function   Self Feeding Requires Assist  (requires set up)   Grooming / Hygiene Requires Assist  (requires set up)   Bathing Requires Assist   Dressing Requires Assist   Toileting Requires Assist   Prior Level of IADL Function   Medication Management Requires Assist   Laundry Requires Assist   Kitchen Mobility Requires Assist   Finances Requires Assist   Home Management Requires Assist   Shopping Requires Assist   Prior Level Of Mobility Unable to Determine At This Time   Driving / Transportation Relatives / Others Provide Transportation   Occupation (Pre-Hospital Vocational) Retired Due To Age   Leisure Interests   (AYAKA)   Prior Functioning: Everyday Activities   Self Care Needed some help   Indoor Mobility (Ambulation) Dependent   Functional Cognition Unknown   Prior Device Use Manual wheelchair   Cognition    Level of Consciousness Alert   ABS (Agitated Behavior Scale)   Agitated Behavior Scale Performed No   Cognitive Pattern Assessment   Cognitive Pattern Assessment Used BIMS   Brief Interview for Mental Status (BIMS)   Repetition of Three Words (First Attempt) 3   Temporal Orientation: Year Correct   Temporal Orientation: Month Missed by more than 1 month   Temporal Orientation: Day Incorrect   Recall: \"Sock\" No, could not recall   Recall: \"Blue\" Yes, after cueing (\"a " "color\")   Recall: \"Bed\" No, could not recall   BIMS Summary Score 7   Vision Screen   Vision Not tested   Passive ROM Upper Body   Passive ROM Upper Body X   Comments Limited LUE, due to stroke 5 years ago   Active ROM Upper Body   Active ROM Upper Body  X   Dominant Hand Right   Comments Limited LUE, due to stroke 5 years ago   Strength Upper Body   Upper Body Strength  X   Comments Limited LUE, due to stroke 5 years ago   Sensation Upper Body   Upper Extremity Sensation  WDL   Upper Body Muscle Tone   Upper Body Muscle Tone  X   Lt Upper Extremity Muscle Tone Rigidity;Spastic   Balance Assessment   Sitting Balance (Static) Poor   Sitting Balance (Dynamic) Poor -   Standing Balance (Static) Poor -   Standing Balance (Dynamic) Poor -   Weight Shift Sitting Poor   Weight Shift Standing Poor   Bed Mobility    Supine to Sit Maximal Assist   Scooting Maximal Assist   Coordination Upper Body   Coordination X   Fine Motor Coordination Limited LUE, due to stroke 5 years ago   Gross Motor Coordination Limited LUE, due to stroke 5 years ago   Eating   Assistance Needed Supervision;Set-up / clean-up   Physical Assistance Level No physical assistance   CARE Score - Eating 4   Eating Discharge Goal   Discharge Goal 5   Oral Hygiene   Assistance Needed Set-up / clean-up;Supervision   Physical Assistance Level No physical assistance   CARE Score - Oral Hygiene 4   Oral Hygiene Discharge Goal   Discharge Goal 5   Shower/Bathe Self   Reason if not Attempted Safety concerns   CARE Score - Shower/Bathe Self 88   Shower/Bathe Self Discharge Goal   Discharge Goal 3   Upper Body Dressing   Assistance Needed Physical assistance   Physical Assistance Level 76% or more   CARE Score - Upper Body Dressing 2   Upper Body Dressing Discharge Goal   Discharge Goal 3   Lower Body Dressing   Assistance Needed Physical assistance   Physical Assistance Level 76% or more   CARE Score - Lower Body Dressing 2   Lower Body Dressing Discharge Goal "   Discharge Goal 3   Putting On/Taking Off Footwear   Assistance Needed Physical assistance   Physical Assistance Level 76% or more   CARE Score - Putting On/Taking Off Footwear 2   Putting On/Taking Off Footwear Discharge Goal   Discharge Goal 3   Toileting Hygiene   Reason if not Attempted Safety concerns   CARE Score - Toileting Hygiene 88   Toilet Transfer   Reason if not Attempted Safety concerns   CARE Score - Toilet Transfer 88   Hearing, Speech, and Vision   Ability to Hear Adequate   Ability to See in Adequate Light Adequate   Expression of Ideas and Wants Without difficulty   Understanding Verbal and Non-Verbal Content Understands   Functional Level of Assist   Eating Supervision   Eating Description Set-up of equipment or meal/tube feeding;Increased time   Grooming Supervision   Grooming Description Set-up of equipment;Seated in wheelchair at sink;Supervision for safety;Verbal cueing   Upper Body Dressing Total Assist   Upper Body Dressing Description Assit with threading arms through sleeves;Assist with pulling shirt over head;Increased time;Initial preparation for task;Set-up of equipment;Supervision for safety;Verbal cueing   Lower Body Dressing Total Assist   Lower Body Dressing Description Assist with threading into pant leg;Increased time;Initial preparation for task;Set-up of equipment;Supervision for safety;Verbal cueing;Grab bar   Bed, Chair, Wheelchair Transfer Total Assist X 2  (mod x1, min x1)   Bed Chair Wheelchair Transfer Description Increased time;Requires lift;Supervision for safety;Squat pivot transfer to wheelchair;Verbal cueing;Set-up of equipment   Problem List   Problem List Decreased Active Daily Living Skills;Decreased Homemaking Skills;Decreased Upper Extremity Strength Left;Decreased Upper Extremity AROM Left;Decreased Upper Extremity PROM Left;Decreased Functional Mobility;Decreased Activity Tolerance;Impaired Coordination Right Upper Extremity;Impaired Coordination Left Upper  Extremity;Safety Awareness Deficits / Cognition;Impaired Cognitive Function;Impaired Postural Control / Balance;Impaired Upper Extremity Tone Left   Precautions   Precautions Fall Risk   Comments previous stroke, L eric   Current Discharge Plan   Current Discharge Plan Return to Prior Living Situation   Benefit    Therapy Benefit Patient Would Benefit from Inpatient Rehab Occupational Therapy to Maximize [place] with ADLs, IADLs and Functional Mobility.   Interdisciplinary Plan of Care Collaboration   IDT Collaboration with  Certified O.T. Assistant  (SWARTZ)   Patient Position at End of Therapy Seated;Phone within Reach;Tray Table within Reach;Call Light within Reach   Collaboration Comments assist w/ transfer   Equipment Needs   Assistive Device / DME Tub Transfer Bench;Grab Bars In Shower / Tub;Grab Bars By Toilet;Shower Chair   Adaptive Equipment Reacher;Sock Aide;Dressing Stick   Strengths & Barriers   Strengths Motivated for self care and independence;Pleasant and cooperative;Supportive family;Willingly participates in therapeutic activities   Barriers Confused;Decreased endurance;Fatigue;Generalized weakness;Impaired activity tolerance;Impaired balance;Impaired functional cognition;Limited mobility   OT Total Time Spent   OT Individual Total Time Spent (Mins) 60   OT Charge Group   Charges Yes   OT Self Care / ADL 1   OT Evaluation OT Evaluation High       Assessment  Patient is 77 y.o. male with a diagnosis of acute encephalopathy. 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 Pt also has a significant history of UTIs and requires catheterization at home.     During OT eval pt presented with decreased endurance, confusion, impaired balance, L side hemiparesis, and generalized weakness. Pt required setup-total A for ADLs throughout session. Pt reports his wife assists him w/ transfers and ADLs at home. Pt was not oriented to month or  place at beginning of session. Pt reports he lives w/ his wife in a single story house in [place], [place] w/ 0 [address]. All information about pt's PLOF and home set up needs to be clarified w/ wife.    Plan  Recommend Occupational Therapy  minutes per day 5-7 days per week for 10-14 days for the following treatments:  OT Orthotics Training, OT E Stim Attended, OT Group Therapy, OT Self Care/ADL, OT Cognitive Skill Dev, OT Community Reintegration, OT Manual Ther Technique, OT Neuro Re-Ed/Balance, OT Sensory Int Techniques, OT Therapeutic Activity, OT Evaluation and OT Therapeutic Exercise.    Passport items to be completed:  Perform bathroom transfers, complete dressing, complete feeding, get ready for the day, prepare a simple meal, participate in household tasks, adapt home for safety needs, demonstrate home exercise program, complete caregiver training     Goals:  Long term and short term goals have been discussed with patient and they are in agreement.    Occupational Therapy Goals (Active)       Problem: Bathing       Dates: Start: 06/01/22         Goal: STG-Within one week, patient will bathe w/ max A using DME as needed.        Dates: Start: 06/01/22               Problem: Dressing       Dates: Start: 06/01/22         Goal: STG-Within one week, patient will dress UB w/ mod A.       Dates: Start: 06/01/22            Goal: STG-Within one week, patient will dress LB w/ mod A.        Dates: Start: 06/01/22               Problem: Functional Transfers       Dates: Start: 06/01/22         Goal: STG-Within one week, patient will transfer to toilet w/ max A using DME as needed.       Dates: Start: 06/01/22            Goal: STG-Within one week, patient will transfer to step in shower with max A using DME as needed.       Dates: Start: 06/01/22               Problem: OT Long Term Goals       Dates: Start: 06/01/22         Goal: LTG-By discharge, patient will complete basic self care tasks with min A using DME and AE as  needed.       Dates: Start: 06/01/22            Goal: LTG-By discharge, patient will perform bathroom transfers w/ min A using DME and AE as needed.       Dates: Start: 06/01/22               Problem: Toileting       Dates: Start: 06/01/22         Goal: STG-Within one week, patient will complete toileting tasks w/ max A using DME as needed.        Dates: Start: 06/01/22

## 2022-06-01 NOTE — PROGRESS NOTES
-----------Non-Face-to-Face------------  Prolonged non-face-to-face time was spent on 5/30/22 from 1145 to 1216 by this reviewer.  This time included medical chart review, medication review, and decision making for the appropriateness of transfer to inpatient rehabilitation.  Please see PM&R Chart Review Consult from below by this provider for detailed summation of the medical chart and the reasoning for current recommendations. In addition to the above, time was spent coordinating care with case management as well as transitional care coordination team in the acceptance and transfer of the patient to the inpatient rehabilitation facility setting. Not medically cleared today. Most likely transfer tomorrow.     Patient is a 77 y.o. 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 Patient reportedly has a significant history of UTIs and requires catheterization at home. He was found to have elevated WBC and UTI positive for bacteria/yeast. He was started on IV antibiotics. Patient reportedly had his CVA in 2016 in [place] and then developed non-Hodgkin's Lymphoma.  He has had MSSA, klebsiella and proteus UTIs in the past and this stay he grew MSSA. His antibiotics have been switched to Cefazolin as he was having worsening diarrhea. C diff was negative. Hospital course was complicated by hyperglycemia, leukocytosis, anemia, CATA and hyponatremia.     Patient was last evaluated on 5/30/22 with OT and was min-maxA for ADLs. Patient was last evaluated by PT on 5/30/22 and was min-maxA for mobility.  Patient was reportedly previously wheelchair bound with Lesia for transfer and mobility due to contractures of left side including left knee flexion contraction.

## 2022-06-01 NOTE — THERAPY
Physical Therapy   Initial Evaluation     Patient Name: Av Bob  Age:  77 y.o., Sex:  male  Medical Record #: [id number]  Today's Date: 6/1/2022     Subjective    Patient seated in w/c and agreeable to PT evaluation, reporting fatigue and requesting to return to bed.     Objective       06/01/22 0931   Prior Living Situation   Prior Services Continuous (24 Hour) Care Giving Family   Housing / Facility 1 Story House   Steps Into Home 0   Steps In Home 0   Bathroom Set up Walk In Shower;Grab Bars;Shower Chair   Equipment Owned Front-Wheel Walker;Quad Cane;Wheelchair;Tub / Shower Seat;Grab Bar(s) In Tub / Shower;Grab Bar(s) By Toilet;Hand Held Shower   Lives with - Patient's Self Care Capacity Spouse   Comments Lives in [place] with wife, Usha, who provides assistance for patient   Prior Level of Functional Mobility   Bed Mobility Required Assist   Transfer Status Required Assist   Ambulation Required Assist   Distance Ambulation (Feet) 50   Assistive Devices Used Quad Cane   Wheelchair Independent   Stairs Other (Comments)  (n/a)   Comments reports requiring assistance for most mobility at w/c level   Prior Functioning: Everyday Activities   Self Care Needed some help   Indoor Mobility (Ambulation) Needed some help   Stairs Not applicable   Functional Cognition Unknown   Prior Device Use Manual wheelchair   ABS (Agitated Behavior Scale)   Agitated Behavior Scale Performed Yes   Short Attention Span, Easy Distractibility, Inability to Concentrate 1   Impulsive, Impatient, Low Tolerance for Pain or Frustration 1   Uncooperative, Resistant to Care, Demanding 1   Violent and/or Threatening Violence Toward People or Property 1   Explosive and/or Unpredictable Anger 1   Rocking, Rubbing, Moaning, Other Self-Stimulating Behavior 1   Pulling at Tubes, Restraints, etc. 1   Wandering from Treatment Area 1   Restlessness, Pacing, Excessive Movement 1   Repetitive Behaviors, Motor and/or Verbal 1   Rapid, Loud or  Excessive Talking 1   Sudden Changes of Mood 1   Easily Initiated - Excessive Crying and/or Laughter 1   Self-Abusiveness, Physical and/or Verbal 1   Agitated Behavior Scale Total Score 14   Level of Severity No Agitation   Passive ROM Lower Body   Passive ROM Lower Body X   Comments grossly WDL except L knee lacking 10 degrees of extension   Active ROM Lower Body    Active ROM Lower Body  X   Comments limited by hemiparesis   Strength Lower Body   Lower Body Strength  X   Lt Hip Extension Strength 2- (P-)   Lt Hip Flexion Strength 2 (P)   Lt Hip Abduction Strength 2- (P-)   Lt Hip Adduction Strength 2- (P-)   Lt Knee Flexion Strength 2- (P-)   Lt Knee Extension Strength 2- (P-)   Lt Ankle Dorsiflexion Strength 1 (T)   Lt Ankle Plantar Flexion Strength 2- (P-)   Comments RLE grossly 4+/5   Sensation Lower Body   Lower Extremity Sensation   X   Comments Denies numbness, tingling, changes in sensation; however testing limited by patient fatigue. Intact to light touch, tactile localization, inconsistent extinguishing of LLE with bilateral simultaneous stimulation.   Lower Body Muscle Tone   Lower Body Muscle Tone  X   Comments MAS 1/4 at LLE hip IRs/ERs, knee flexors   Balance Assessment   Sitting Balance (Static) Poor   Sitting Balance (Dynamic) Poor -   Standing Balance (Static) Poor -   Standing Balance (Dynamic) Poor -   Bed Mobility    Sit to Supine Maximal Assist   Sit to Stand Maximal Assist   Scooting Maximal Assist   Rolling Moderate Assist to Rt.;Moderate Assist to Lt.;Minimum Assist to Lt.   Coordination Lower Body    Comments limited by hemiparesis and impaired motor control   Roll Left and Right   Assistance Needed Physical assistance   Physical Assistance Level 26%-50%   CARE Score - Roll Left and Right 3   Roll Left and Right Discharge Goal   Discharge Goal 6   Sit to Lying   Assistance Needed Physical assistance   Physical Assistance Level 76% or more   CARE Score - Sit to Lying 2   Sit to Lying  Discharge Goal   Discharge Goal 6   Lying to Sitting on Side of Bed   Assistance Needed Physical assistance   Physical Assistance Level 76% or more   CARE Score - Lying to Sitting on Side of Bed 2   Lying to Sitting on Side of Bed Discharge Goal   Discharge Goal 6   Sit to Stand   Assistance Needed Physical assistance   Physical Assistance Level Total assistance   CARE Score - Sit to Stand 1   Sit to Stand Discharge Goal   Discharge Goal 4   Chair/Bed-to-Chair Transfer   Assistance Needed Physical assistance   Physical Assistance Level Total assistance   CARE Score - Chair/Bed-to-Chair Transfer 1   Chair/Bed-to-Chair Transfer Discharge Goal   Discharge Goal 4   Car Transfer   Reason if not Attempted Safety concerns   CARE Score - Car Transfer 88   Car Transfer Discharge Goal   Discharge Goal 4   Walk 10 Feet   Assistance Needed Physical assistance   Physical Assistance Level Total assistance   CARE Score - Walk 10 Feet 1   Walk 10 Feet Discharge Goal   Discharge Goal 4   Walk 50 Feet with Two Turns   Reason if not Attempted Safety concerns   CARE Score - Walk 50 Feet with Two Turns 88   Walk 50 Feet with Two Turns Discharge Goal   Discharge Goal 4   Walk 150 Feet   Reason if not Attempted Activity not applicable  (was not able to perform at baseline)   CARE Score - Walk 150 Feet 9   Walk 150 Feet Discharge Goal   Discharge Goal 9   Walking 10 Feet on Uneven Surfaces   Reason if not Attempted Safety concerns   CARE Score - Walking 10 Feet on Uneven Surfaces 88   Walking 10 Feet on Uneven Surfaces Discharge Goal   Discharge Goal 4   1 Step (Curb)   Reason if not Attempted Activity not applicable   CARE Score - 1 Step (Curb) 9   1 Step (Curb) Discharge Goal   Discharge Goal 9   4 Steps   Reason if not Attempted Activity not applicable   CARE Score - 4 Steps 9   4 Steps Discharge Goal   Discharge Goal 9   12 Steps   Reason if not Attempted Activity not applicable   CARE Score - 12 Steps 9   12 Steps Discharge Goal    Discharge Goal 9   Picking Up Object   Reason if not Attempted Safety concerns   CARE Score - Picking Up Object 88   Picking Up Object Discharge Goal   Discharge Goal 4   Wheel 50 Feet with Two Turns   Assistance Needed Physical assistance   Physical Assistance Level Total assistance   CARE Score - Wheel 50 Feet with Two Turns 1   Type of Wheelchair/Scooter Manual   Wheel 50 Feet with Two Turns Discharge Goal   Discharge Goal 6   Wheel 150 Feet   Assistance Needed Physical assistance   Physical Assistance Level Total assistance   CARE Score - Wheel 150 Feet 1   Type of Wheelchair/Scooter Manual   Wheel 150 Feet Discharge Goal   Discharge Goal 6   Gait Functional Level of Assist    Gait Level Of Assist Unable to Participate  (limited by incontinence management)   Wheelchair Functional Level of Assist   Wheelchair Assist Total Assist   Distance Wheelchair (Feet or Distance) 50   Wheelchair Description Assistance with steering;Extra time;Leg rest management;Impaired coordination;Limited by fatigue;Safety concerns;Supervision for safety;Verbal cueing   Stairs Functional Level of Assist   Level of Assist with Stairs Unable to Participate   Transfer Functional Level of Assist   Bed, Chair, Wheelchair Transfer Total Assist X 2  (Mod Ax2)   Bed Chair Wheelchair Transfer Description Increased time;Initial preparation for task;Squat pivot transfer to wheelchair;Supervision for safety;Verbal cueing   Problem List    Problems Impaired Bed Mobility;Impaired Transfers;Impaired Ambulation;Functional ROM Deficit;Functional Strength Deficit;Impaired Balance;Decreased Activity Tolerance;Safety Awareness Deficits / Cognition;Motor Planning / Sequencing   Precautions   Precautions Fall Risk;Swallow Precautions ( See Comments)   Comments L eric from previous CVA   Current Discharge Plan   Current Discharge Plan Return to Prior Living Situation   Interdisciplinary Plan of Care Collaboration   IDT Collaboration with  Nursing;Certified  Nursing Assistant;Occupational Therapist;Therapy Tech   Patient Position at End of Therapy In Bed;Call Light within Reach;Tray Table within Reach;Phone within Reach   Collaboration Comments CLOTAY SALEH; therapy tech assisting with transfer   Benefit   Therapy Benefit Patient Would Benefit from Inpatient Rehabilitation Physical Therapy to Maximize Functional [place] with ADLs, IADLs and Mobility.   Strengths & Barriers   Strengths Able to follow instructions;Motivated for self care and independence;Pleasant and cooperative;Supportive family;Willingly participates in therapeutic activities   Barriers Bladder incontinence;Decreased endurance;Hemiparesis;Impaired activity tolerance;Impaired balance;Limited mobility   PT Total Time Spent   PT Individual Total Time Spent (Mins) 60   PT Charge Group   PT Therapeutic Activities 1   PT Evaluation PT Evaluation Mod       Assessment  Patient is 77 y.o. male with a diagnosis of acute encephalopathy. Patient presented on 5/28/22 with weakness, diarrhea, and confusion. He was found to have elevated WBC and positive for UTI, started on antibiotics. Hospitalization complicated by hyperglycemia, leukocytosis, anemia, CATA, and hyponatremia. PMH includes: R CVA in 2016 with L hemiparesis, non-Hodgkin's lymphoma, HLD, DM2, gout, HTN, significant history of UTIs. Additional factors influencing patient status / progress (ie: cognitive factors, co-morbidities, social support, etc): Patient was limited by fatigue and bladder incontinence on evaluation, however reports that he was independent with bed mobility and w/c propulsion at his w/c accessible home. He reports his wife assists him with transfers and ADLs, and that he was able to ambulate distances of ~50 ft with SBA and quad cane. Primary barriers include impaired motor planning, impaired balance, decreased endurance/activity tolerance, decreased strength, and decreased L knee ROM. Barriers include his L hemiparesis and impaired  cognition. Patient will benefit from skilled physical therapy to address current impairments and maximize functional mobility and safety prior to discharge.    Plan  Recommend Physical Therapy  minutes per day 5-7 days per week for 3 weeks for the following treatments:  PT Group Therapy, PT Gait Training, PT Therapeutic Exercises, PT Neuro Re-Ed/Balance, PT Therapeutic Activity and PT Evaluation.    Passport items to be completed:  Get in/out of bed safely, in/out of a vehicle, safely use mobility device, walk or wheel around home/community, navigate up and down stairs, show how to get up/down from the ground, ensure home is accessible, demonstrate HEP, complete caregiver training    Goals:  Long term and short term goals have been discussed with patient and they are in agreement.    Physical Therapy Problems (Active)       Problem: Balance       Dates: Start: 06/01/22         Goal: STG-Within one week, patient will tolerate Function in Sitting Test assessment.       Dates: Start: 06/01/22               Problem: Mobility       Dates: Start: 06/01/22         Goal: STG-Within one week, patient will propel wheelchair household distances 50 ft with Min A.       Dates: Start: 06/01/22            Goal: STG-Within one week, patient will tolerate gait assessment       Dates: Start: 06/01/22               Problem: Mobility Transfers       Dates: Start: 06/01/22         Goal: STG-Within one week, patient will perform bed mobility with Mod A and bed functions as needed.       Dates: Start: 06/01/22            Goal: STG-Within one week, patient will transfer bed to chair with Max Ax1 via squat/stand pivot.       Dates: Start: 06/01/22               Problem: PT-Long Term Goals       Dates: Start: 06/01/22         Goal: LTG-By discharge, patient will propel wheelchair 50 ft mod I.       Dates: Start: 06/01/22            Goal: LTG-By discharge, patient will ambulate 50 ft with LRAD and Min A.       Dates: Start: 06/01/22             Goal: LTG-By discharge, patient will transfer one surface to another with CGA and LRAD.       Dates: Start: 06/01/22            Goal: LTG-By discharge, patient will transfer in/out of a car with CGA and LRAD.       Dates: Start: 06/01/22            Goal: LTG-By discharge, patient will perform bed mobility independently.       Dates: Start: 06/01/22

## 2022-06-01 NOTE — WOUND TEAM
Wound team consulted for redness to groin, penis, and to coccyx. All areas of redness related to moisture. Miconazole SHARLA ordered, however was not in place at time of assessment. Barrier paste applied. Consult completed. No advanced wound care needs at this time.     All images available under .

## 2022-06-01 NOTE — ASSESSMENT & PLAN NOTE
WBC's have been elevated since 5/28  WBC's: 17.1 (5/28) --> 15.4 --> 14.9 --> 16.1 --> 16.5 (6/1) --> 14.6 (6/2) --> 12.7 (6/5)  Has been afebrile  Denies cough  Has some loose stools  U/A (+) --> has hx of recurrent UTI's - see other assessment  C. Diff neg (5/29)  CXR: unremarkable  Procalcitonin: wnl levels  On Imodium prn  Note: had been on Ancef for recent UTI (off 6/5)  Cont to monitor

## 2022-06-01 NOTE — CARE PLAN
"  Problem: Knowledge Deficit - Standard  Goal: Patient and family/care givers will demonstrate understanding of plan of care, disease process/condition, diagnostic tests and medications  Outcome: Progressing  Note: Pt agrees with plan of care tonight regarding medications and safety.  Will continue to monitor patient.      Problem: Fall Risk - Rehab  Goal: Patient will remain free from falls  Outcome: Progressing  Note: Camilla Gruber Fall risk Assessment Score:  21    High fall risk Interventions   - Alarming seatbelt  - Wander guard  - Bed and strip alarm   - Yellow sign by the door   - Yellow wrist band \"Fall risk\"  - Room near to the nurse station  - Do not leave patient unattended in the bathroom  - Fall risk education provided        The patient is Watcher - Medium risk of patient condition declining or worsening    Shift Goals  Clinical Goals: Safety  Patient Goals: Sleep well    Progress made toward(s) clinical / shift goals:  progressing          "

## 2022-06-02 LAB
BACTERIA BLD CULT: NORMAL
BACTERIA BLD CULT: NORMAL
BASOPHILS # BLD AUTO: 0.3 % (ref 0–1.8)
BASOPHILS # BLD: 0.05 K/UL (ref 0–0.12)
EOSINOPHIL # BLD AUTO: 0.44 K/UL (ref 0–0.51)
EOSINOPHIL NFR BLD: 3 % (ref 0–6.9)
ERYTHROCYTE [DISTWIDTH] IN BLOOD BY AUTOMATED COUNT: 44.6 FL (ref 35.9–50)
GLUCOSE BLD STRIP.AUTO-MCNC: 107 MG/DL (ref 65–99)
GLUCOSE BLD STRIP.AUTO-MCNC: 133 MG/DL (ref 65–99)
GLUCOSE BLD STRIP.AUTO-MCNC: 142 MG/DL (ref 65–99)
GLUCOSE BLD STRIP.AUTO-MCNC: 147 MG/DL (ref 65–99)
HCT VFR BLD AUTO: 36.2 % (ref 42–52)
HGB BLD-MCNC: 12 G/DL (ref 14–18)
IMM GRANULOCYTES # BLD AUTO: 0.27 K/UL (ref 0–0.11)
IMM GRANULOCYTES NFR BLD AUTO: 1.9 % (ref 0–0.9)
LYMPHOCYTES # BLD AUTO: 1.08 K/UL (ref 1–4.8)
LYMPHOCYTES NFR BLD: 7.4 % (ref 22–41)
MCH RBC QN AUTO: 28.9 PG (ref 27–33)
MCHC RBC AUTO-ENTMCNC: 33.1 G/DL (ref 33.7–35.3)
MCV RBC AUTO: 87.2 FL (ref 81.4–97.8)
MONOCYTES # BLD AUTO: 0.82 K/UL (ref 0–0.85)
MONOCYTES NFR BLD AUTO: 5.6 % (ref 0–13.4)
NEUTROPHILS # BLD AUTO: 11.92 K/UL (ref 1.82–7.42)
NEUTROPHILS NFR BLD: 81.8 % (ref 44–72)
NRBC # BLD AUTO: 0 K/UL
NRBC BLD-RTO: 0 /100 WBC
PLATELET # BLD AUTO: 443 K/UL (ref 164–446)
PMV BLD AUTO: 9.4 FL (ref 9–12.9)
PROCALCITONIN SERPL-MCNC: 0.04 NG/ML
RBC # BLD AUTO: 4.15 M/UL (ref 4.7–6.1)
SIGNIFICANT IND 70042: NORMAL
SIGNIFICANT IND 70042: NORMAL
SITE SITE: NORMAL
SITE SITE: NORMAL
SOURCE SOURCE: NORMAL
SOURCE SOURCE: NORMAL
WBC # BLD AUTO: 14.6 K/UL (ref 4.8–10.8)

## 2022-06-02 PROCEDURE — 99232 SBSQ HOSP IP/OBS MODERATE 35: CPT | Performed by: HOSPITALIST

## 2022-06-02 PROCEDURE — 36415 COLL VENOUS BLD VENIPUNCTURE: CPT

## 2022-06-02 PROCEDURE — A9270 NON-COVERED ITEM OR SERVICE: HCPCS | Performed by: HOSPITALIST

## 2022-06-02 PROCEDURE — 700102 HCHG RX REV CODE 250 W/ 637 OVERRIDE(OP): Performed by: HOSPITALIST

## 2022-06-02 PROCEDURE — 700102 HCHG RX REV CODE 250 W/ 637 OVERRIDE(OP): Performed by: PHYSICAL MEDICINE & REHABILITATION

## 2022-06-02 PROCEDURE — 97129 THER IVNTJ 1ST 15 MIN: CPT

## 2022-06-02 PROCEDURE — 84145 PROCALCITONIN (PCT): CPT

## 2022-06-02 PROCEDURE — 92610 EVALUATE SWALLOWING FUNCTION: CPT

## 2022-06-02 PROCEDURE — 97130 THER IVNTJ EA ADDL 15 MIN: CPT

## 2022-06-02 PROCEDURE — 97535 SELF CARE MNGMENT TRAINING: CPT

## 2022-06-02 PROCEDURE — 85025 COMPLETE CBC W/AUTO DIFF WBC: CPT

## 2022-06-02 PROCEDURE — 770010 HCHG ROOM/CARE - REHAB SEMI PRIVAT*

## 2022-06-02 PROCEDURE — 97530 THERAPEUTIC ACTIVITIES: CPT

## 2022-06-02 PROCEDURE — 82962 GLUCOSE BLOOD TEST: CPT | Mod: 91

## 2022-06-02 PROCEDURE — 700101 HCHG RX REV CODE 250: Performed by: PHYSICAL MEDICINE & REHABILITATION

## 2022-06-02 PROCEDURE — 97112 NEUROMUSCULAR REEDUCATION: CPT

## 2022-06-02 PROCEDURE — 700105 HCHG RX REV CODE 258: Performed by: PHYSICAL MEDICINE & REHABILITATION

## 2022-06-02 PROCEDURE — 700111 HCHG RX REV CODE 636 W/ 250 OVERRIDE (IP): Performed by: PHYSICAL MEDICINE & REHABILITATION

## 2022-06-02 PROCEDURE — 99233 SBSQ HOSP IP/OBS HIGH 50: CPT | Performed by: PHYSICAL MEDICINE & REHABILITATION

## 2022-06-02 PROCEDURE — A9270 NON-COVERED ITEM OR SERVICE: HCPCS | Performed by: PHYSICAL MEDICINE & REHABILITATION

## 2022-06-02 RX ORDER — LIDOCAINE 50 MG/G
1 PATCH TOPICAL EVERY 24 HOURS
Status: DISCONTINUED | OUTPATIENT
Start: 2022-06-02 | End: 2022-06-21 | Stop reason: HOSPADM

## 2022-06-02 RX ORDER — FLUCONAZOLE 100 MG/1
100 TABLET ORAL DAILY
Status: COMPLETED | OUTPATIENT
Start: 2022-06-03 | End: 2022-06-06

## 2022-06-02 RX ORDER — FLUCONAZOLE 100 MG/1
200 TABLET ORAL ONCE
Status: COMPLETED | OUTPATIENT
Start: 2022-06-02 | End: 2022-06-02

## 2022-06-02 RX ADMIN — Medication 1000 UNITS: at 08:59

## 2022-06-02 RX ADMIN — ALLOPURINOL 100 MG: 100 TABLET ORAL at 05:31

## 2022-06-02 RX ADMIN — LOSARTAN POTASSIUM 50 MG: 25 TABLET, FILM COATED ORAL at 08:59

## 2022-06-02 RX ADMIN — FLUOXETINE 40 MG: 20 CAPSULE ORAL at 08:59

## 2022-06-02 RX ADMIN — CEFAZOLIN 2 G: 2 INJECTION, POWDER, FOR SOLUTION INTRAMUSCULAR; INTRAVENOUS at 05:28

## 2022-06-02 RX ADMIN — RIVAROXABAN 10 MG: 10 TABLET, FILM COATED ORAL at 17:22

## 2022-06-02 RX ADMIN — CLOPIDOGREL 75 MG: 75 TABLET, FILM COATED ORAL at 05:31

## 2022-06-02 RX ADMIN — LIDOCAINE 1 PATCH: 50 PATCH TOPICAL at 14:34

## 2022-06-02 RX ADMIN — ACETAMINOPHEN 650 MG: 325 TABLET ORAL at 10:47

## 2022-06-02 RX ADMIN — TAMSULOSIN HYDROCHLORIDE 0.4 MG: 0.4 CAPSULE ORAL at 09:00

## 2022-06-02 RX ADMIN — FLUCONAZOLE 200 MG: 100 TABLET ORAL at 11:14

## 2022-06-02 RX ADMIN — MICONAZOLE NITRATE: 20 CREAM TOPICAL at 22:14

## 2022-06-02 RX ADMIN — ATORVASTATIN CALCIUM 80 MG: 40 TABLET, FILM COATED ORAL at 22:08

## 2022-06-02 RX ADMIN — MICONAZOLE NITRATE: 20 CREAM TOPICAL at 09:00

## 2022-06-02 RX ADMIN — OMEPRAZOLE 20 MG: 20 CAPSULE, DELAYED RELEASE ORAL at 08:59

## 2022-06-02 RX ADMIN — CEFAZOLIN 2 G: 2 INJECTION, POWDER, FOR SOLUTION INTRAMUSCULAR; INTRAVENOUS at 14:35

## 2022-06-02 RX ADMIN — SENNOSIDES AND DOCUSATE SODIUM 2 TABLET: 50; 8.6 TABLET ORAL at 22:09

## 2022-06-02 RX ADMIN — CEFAZOLIN 2 G: 2 INJECTION, POWDER, FOR SOLUTION INTRAMUSCULAR; INTRAVENOUS at 22:09

## 2022-06-02 RX ADMIN — INSULIN GLARGINE-YFGN 11 UNITS: 100 INJECTION, SOLUTION SUBCUTANEOUS at 22:14

## 2022-06-02 ASSESSMENT — GAIT ASSESSMENTS
DISTANCE (FEET): 5
DEVIATION: STEP TO;DECREASED BASE OF SUPPORT;SHUFFLED GAIT;DECREASED HEEL STRIKE;DECREASED TOE OFF
GAIT LEVEL OF ASSIST: TOTAL ASSIST X 2

## 2022-06-02 ASSESSMENT — ENCOUNTER SYMPTOMS
COUGH: 0
DIARRHEA: 0
NERVOUS/ANXIOUS: 0
FEVER: 0
DIZZINESS: 0
BLURRED VISION: 0

## 2022-06-02 ASSESSMENT — ACTIVITIES OF DAILY LIVING (ADL)
BED_CHAIR_WHEELCHAIR_TRANSFER_DESCRIPTION: INCREASED TIME;INITIAL PREPARATION FOR TASK;SQUAT PIVOT TRANSFER TO WHEELCHAIR;SUPERVISION FOR SAFETY;VERBAL CUEING
TOILETING_LEVEL_OF_ASSIST_DESCRIPTION: ASSIST FOR HYGIENE;ASSIST TO PULL PANTS UP;ASSIST TO PULL PANTS DOWN;ASSIST FOR STANDING BALANCE;GRAB BAR;INCREASED TIME

## 2022-06-02 ASSESSMENT — PAIN DESCRIPTION - PAIN TYPE
TYPE: ACUTE PAIN
TYPE: ACUTE PAIN

## 2022-06-02 NOTE — PROGRESS NOTES
[facility] Medicine Daily Progress Note    Date of Service  6/2/2022    Chief Complaint:  Hypertension  Diabetes  Leukocytosis    Interval History:  No complaints.  Doing ok.    Review of Systems  Review of Systems   Constitutional: Negative for fever.   Eyes: Negative for blurred vision.   Respiratory: Negative for cough.    Cardiovascular: Negative for chest pain.   Gastrointestinal: Negative for diarrhea.   Musculoskeletal: Negative for joint pain.   Neurological: Negative for dizziness.   Psychiatric/Behavioral: The patient is not nervous/anxious.         Physical Exam  Temp:  [36.2 °C (97.2 °F)-36.4 °C (97.5 °F)] 36.2 °C (97.2 °F)  Pulse:  [62-68] 68  Resp:  [18] 18  BP: (142-146)/(63-78) 142/78  SpO2:  [92 %-96 %] 92 %    Physical Exam  Vitals and nursing note reviewed.   Constitutional:       Appearance: He is not diaphoretic.   HENT:      Mouth/Throat:      Pharynx: No oropharyngeal exudate or posterior oropharyngeal erythema.   Eyes:      Extraocular Movements: Extraocular movements intact.   Neck:      Vascular: No carotid bruit.   Cardiovascular:      Rate and Rhythm: Normal rate and regular rhythm.      Heart sounds: No murmur heard.  Pulmonary:      Effort: Pulmonary effort is normal.      Breath sounds: Normal breath sounds. No stridor.   Abdominal:      General: Bowel sounds are normal.      Palpations: Abdomen is soft.   Musculoskeletal:      Right lower leg: No edema.      Left lower leg: No edema.   Skin:     General: Skin is warm and dry.      Findings: No rash.   Neurological:      Mental Status: He is alert and oriented to person, place, and time.   Psychiatric:         Mood and Affect: Mood normal.         Behavior: Behavior normal.         Fluids    Intake/Output Summary (Last 24 hours) at 6/2/2022 0929  Last data filed at 6/1/2022 1830  Gross per 24 hour   Intake 360 ml   Output 300 ml   Net 60 ml       Laboratory  Recent Labs     05/31/22  0300 06/01/22  0630 06/02/22  0509   WBC 16.1* 16.5*  14.6*   RBC 4.21* 4.32* 4.15*   HEMOGLOBIN 12.1* 12.2* 12.0*   HEMATOCRIT 36.9* 37.2* 36.2*   MCV 87.6 86.1 87.2   MCH 28.7 28.2 28.9   MCHC 32.8* 32.8* 33.1*   RDW 45.3 44.5 44.6   PLATELETCT 420 476* 443   MPV 9.5 9.6 9.4     Recent Labs     05/31/22  0300 06/01/22  0630   SODIUM 137 137   POTASSIUM 4.0 3.8   CHLORIDE 107 106   CO2 19* 20   GLUCOSE 104* 96   BUN 21 22   CREATININE 1.36 1.15   CALCIUM 8.5 8.5                   Imaging    Assessment/Plan  Leukocytosis  Assessment & Plan  WBC's have been elevated since 5/28  WBC's: 17.1 (5/28) --> 15.4 --> 14.9 --> 16.1 --> 16.5 (6/1) --> 14.6 (6/2)  Has been afebrile  Denies cough  Has some loose stools  Has hx of recurrent UTI's - see other assessment  C. DIff neg (5/29)  U/A (+) with yeast --> will get C&S  CXR: unremarkable  Procalcitonin: wnl levels  Will check stool for C. DIff  Note: on Ancef for recent UTI  Cont to monitor    Vitamin D insufficiency  Assessment & Plan  Vit D: 29  On supplements    Essential hypertension, benign- (present on admission)  Assessment & Plan  BP a little more elevated recently   On Toprol XL  On Cozaar  Note also on FLomax  Will monitor another day before adjusting meds    Benign prostatic hyperplasia with urinary obstruction- (present on admission)  Assessment & Plan  On Flomax    GERD with esophagitis- (present on admission)  Assessment & Plan  On Prilosec    Recurrent UTI- (present on admission)  Assessment & Plan  Has hx of recurrent UTI  Was adm to [facility] for UTI, diarrhea, and confusion  UC --> Staph Aureus (and U/A showed yeast)  Repeat U/A (6/1): positive with yeast --> will get C&S  On Ancef (thru 6/5)  Note: pt self caths at home    Type 2 diabetes mellitus, with long-term current use of insulin (HCC)- (present on admission)  Assessment & Plan  Hba1c: 6.6 (6/1)  BS  (hit 185 x 1)  On Glargie: 11 units qhs  Note: home meds include Trulicity 3 mg weekly, Toujeo 5-12 units daily, Humalog 5 units base and per SS  Cont to  monitor

## 2022-06-02 NOTE — PROGRESS NOTES
Rehab Progress Note     Encounter Date: 6/2/2022    CC: Decreased mobility, confusion    Interval Events (Subjective)  Vital signs and labs reviewed.   Patient seen and examined at bedside, with wife at side. Patent reports that he wants to leave rehab because he doesn't like the rule and doesn't like being left in the room between therapies. Patient's wife helpful in calming patient down and reorienting him to the value of therapy. Patient reports low back pain, improved with palpation of musculature. Discussed plan for lidocaine patch to back.     Objective:  VITAL SIGNS: BP (!) 142/78   Pulse 68   Temp 36.2 °C (97.2 °F) (Temporal)   Resp 18   Ht 1.829 m (6')   Wt 85.5 kg (188 lb 7.9 oz)   SpO2 92%   BMI 25.56 kg/m²   Gen: NAD, seated in [facility], wife at bedside   Psych: frustrated,nearly tearful about being at rehab   CV: RRR, no edema  Resp: CTAB, no upper airway sounds  MSK : no pain with palpation to low back, resolution of pain with massage of lumbar paraspinals, - [employer] sign   Abd: NTND  Neuro: AOx3, left hemiparesis    Recent Results (from the past 72 hour(s))   POCT glucose device results    Collection Time: 05/30/22 11:28 AM   Result Value Ref Range    POC Glucose, Blood 144 (H) 65 - 99 mg/dL   POCT glucose device results    Collection Time: 05/30/22  4:44 PM   Result Value Ref Range    POC Glucose, Blood 119 (H) 65 - 99 mg/dL   POCT glucose device results    Collection Time: 05/30/22  9:02 PM   Result Value Ref Range    POC Glucose, Blood 155 (H) 65 - 99 mg/dL   Renal Function Panel    Collection Time: 05/31/22  3:00 AM   Result Value Ref Range    Sodium 137 135 - 145 mmol/L    Potassium 4.0 3.6 - 5.5 mmol/L    Chloride 107 96 - 112 mmol/L    Co2 19 (L) 20 - 33 mmol/L    Glucose 104 (H) 65 - 99 mg/dL    Creatinine 1.36 0.50 - 1.40 mg/dL    Bun 21 8 - 22 mg/dL    Calcium 8.5 8.4 - 10.2 mg/dL    Phosphorus 2.6 2.5 - 4.5 mg/dL    Albumin 2.4 (L) 3.2 - 4.9 g/dL   MAGNESIUM    Collection Time: 05/31/22   3:00 AM   Result Value Ref Range    Magnesium 1.6 1.5 - 2.5 mg/dL   CBC WITHOUT DIFFERENTIAL    Collection Time: 05/31/22  3:00 AM   Result Value Ref Range    WBC 16.1 (H) 4.8 - 10.8 K/uL    RBC 4.21 (L) 4.70 - 6.10 M/uL    Hemoglobin 12.1 (L) 14.0 - 18.0 g/dL    Hematocrit 36.9 (L) 42.0 - 52.0 %    MCV 87.6 81.4 - 97.8 fL    MCH 28.7 27.0 - 33.0 pg    MCHC 32.8 (L) 33.7 - 35.3 g/dL    RDW 45.3 35.9 - 50.0 fL    Platelet Count 420 164 - 446 K/uL    MPV 9.5 9.0 - 12.9 fL   ESTIMATED GFR    Collection Time: 05/31/22  3:00 AM   Result Value Ref Range    GFR (CKD-EPI) 54 (A) >60 mL/min/1.73 m 2   POCT glucose device results    Collection Time: 05/31/22  5:43 AM   Result Value Ref Range    POC Glucose, Blood 118 (H) 65 - 99 mg/dL   POCT glucose device results    Collection Time: 05/31/22 11:57 AM   Result Value Ref Range    POC Glucose, Blood 101 (H) 65 - 99 mg/dL   COV-2, FLU A/B, AND RSV BY PCR (2-4 HOURS CEPHEID): Collect NP swab in VTM    Collection Time: 05/31/22  1:00 PM    Specimen: Respirate   Result Value Ref Range    Influenza virus A RNA Negative Negative    Influenza virus B, PCR Negative Negative    RSV, PCR Negative Negative    SARS-CoV-2 by PCR NotDetected     SARS-CoV-2 Source NP Swab    POCT glucose device results    Collection Time: 05/31/22  5:22 PM   Result Value Ref Range    POC Glucose, Blood 113 (H) 65 - 99 mg/dL   POCT glucose device results    Collection Time: 05/31/22 10:25 PM   Result Value Ref Range    POC Glucose, Blood 150 (H) 65 - 99 mg/dL   CBC with Differential    Collection Time: 06/01/22  6:30 AM   Result Value Ref Range    WBC 16.5 (H) 4.8 - 10.8 K/uL    RBC 4.32 (L) 4.70 - 6.10 M/uL    Hemoglobin 12.2 (L) 14.0 - 18.0 g/dL    Hematocrit 37.2 (L) 42.0 - 52.0 %    MCV 86.1 81.4 - 97.8 fL    MCH 28.2 27.0 - 33.0 pg    MCHC 32.8 (L) 33.7 - 35.3 g/dL    RDW 44.5 35.9 - 50.0 fL    Platelet Count 476 (H) 164 - 446 K/uL    MPV 9.6 9.0 - 12.9 fL    Neutrophils-Polys 82.60 (H) 44.00 - 72.00 %     Lymphocytes 7.00 (L) 22.00 - 41.00 %    Monocytes 4.70 0.00 - 13.40 %    Eosinophils 3.10 0.00 - 6.90 %    Basophils 0.40 0.00 - 1.80 %    Immature Granulocytes 2.20 (H) 0.00 - 0.90 %    Nucleated RBC 0.00 /100 WBC    Neutrophils (Absolute) 13.60 (H) 1.82 - 7.42 K/uL    Lymphs (Absolute) 1.16 1.00 - 4.80 K/uL    Monos (Absolute) 0.78 0.00 - 0.85 K/uL    Eos (Absolute) 0.51 0.00 - 0.51 K/uL    Baso (Absolute) 0.07 0.00 - 0.12 K/uL    Immature Granulocytes (abs) 0.37 (H) 0.00 - 0.11 K/uL    NRBC (Absolute) 0.00 K/uL   Comp Metabolic Panel (CMP)    Collection Time: 06/01/22  6:30 AM   Result Value Ref Range    Sodium 137 135 - 145 mmol/L    Potassium 3.8 3.6 - 5.5 mmol/L    Chloride 106 96 - 112 mmol/L    Co2 20 20 - 33 mmol/L    Anion Gap 11.0 7.0 - 16.0    Glucose 96 65 - 99 mg/dL    Bun 22 8 - 22 mg/dL    Creatinine 1.15 0.50 - 1.40 mg/dL    Calcium 8.5 8.5 - 10.5 mg/dL    AST(SGOT) 17 12 - 45 U/L    ALT(SGPT) 5 2 - 50 U/L    Alkaline Phosphatase 116 (H) 30 - 99 U/L    Total Bilirubin 0.5 0.1 - 1.5 mg/dL    Albumin 2.7 (L) 3.2 - 4.9 g/dL    Total Protein 5.2 (L) 6.0 - 8.2 g/dL    Globulin 2.5 1.9 - 3.5 g/dL    A-G Ratio 1.1 g/dL   HEMOGLOBIN A1C    Collection Time: 06/01/22  6:30 AM   Result Value Ref Range    Glycohemoglobin 6.6 (H) 4.0 - 5.6 %    Est Avg Glucose 143 mg/dL   TSH with Reflex to FT4    Collection Time: 06/01/22  6:30 AM   Result Value Ref Range    TSH 1.560 0.380 - 5.330 uIU/mL   Vitamin D, 25-hydroxy (blood)    Collection Time: 06/01/22  6:30 AM   Result Value Ref Range    25-Hydroxy   Vitamin D 25 29 (L) 30 - 100 ng/mL   ESTIMATED GFR    Collection Time: 06/01/22  6:30 AM   Result Value Ref Range    GFR (CKD-EPI) 66 >60 mL/min/1.73 m 2   POCT glucose device results    Collection Time: 06/01/22  7:40 AM   Result Value Ref Range    POC Glucose, Blood 95 65 - 99 mg/dL   POCT glucose device results    Collection Time: 06/01/22 11:08 AM   Result Value Ref Range    POC Glucose, Blood 185 (H) 65 - 99  mg/dL   URINALYSIS    Collection Time: 06/01/22  4:00 PM    Specimen: Urine, Clean Catch   Result Value Ref Range    Color Yellow     Character Turbid (A)     Specific Gravity 1.020 <1.035    Ph 5.5 5.0 - 8.0    Glucose Negative Negative mg/dL    Ketones Negative Negative mg/dL    Protein 100 (A) Negative mg/dL    Bilirubin Negative Negative    Urobilinogen, Urine 0.2 Negative    Nitrite Negative Negative    Leukocyte Esterase Moderate (A) Negative    Occult Blood Large (A) Negative    Micro Urine Req Microscopic    URINE MICROSCOPIC (W/UA)    Collection Time: 06/01/22  4:00 PM   Result Value Ref Range    WBC Packed (A) /hpf    RBC 20-50 (A) /hpf    Bacteria Few (A) None /hpf    Epithelial Cells Negative /hpf    Hyphae Yeast Present (A) Absent /hpf   POCT glucose device results    Collection Time: 06/01/22  5:15 PM   Result Value Ref Range    POC Glucose, Blood 91 65 - 99 mg/dL   POCT glucose device results    Collection Time: 06/01/22  9:03 PM   Result Value Ref Range    POC Glucose, Blood 130 (H) 65 - 99 mg/dL   PROCALCITONIN    Collection Time: 06/02/22  5:09 AM   Result Value Ref Range    Procalcitonin 0.04 <0.25 ng/mL   CBC WITH DIFFERENTIAL    Collection Time: 06/02/22  5:09 AM   Result Value Ref Range    WBC 14.6 (H) 4.8 - 10.8 K/uL    RBC 4.15 (L) 4.70 - 6.10 M/uL    Hemoglobin 12.0 (L) 14.0 - 18.0 g/dL    Hematocrit 36.2 (L) 42.0 - 52.0 %    MCV 87.2 81.4 - 97.8 fL    MCH 28.9 27.0 - 33.0 pg    MCHC 33.1 (L) 33.7 - 35.3 g/dL    RDW 44.6 35.9 - 50.0 fL    Platelet Count 443 164 - 446 K/uL    MPV 9.4 9.0 - 12.9 fL    Neutrophils-Polys 81.80 (H) 44.00 - 72.00 %    Lymphocytes 7.40 (L) 22.00 - 41.00 %    Monocytes 5.60 0.00 - 13.40 %    Eosinophils 3.00 0.00 - 6.90 %    Basophils 0.30 0.00 - 1.80 %    Immature Granulocytes 1.90 (H) 0.00 - 0.90 %    Nucleated RBC 0.00 /100 WBC    Neutrophils (Absolute) 11.92 (H) 1.82 - 7.42 K/uL    Lymphs (Absolute) 1.08 1.00 - 4.80 K/uL    Monos (Absolute) 0.82 0.00 - 0.85 K/uL     Eos (Absolute) 0.44 0.00 - 0.51 K/uL    Baso (Absolute) 0.05 0.00 - 0.12 K/uL    Immature Granulocytes (abs) 0.27 (H) 0.00 - 0.11 K/uL    NRBC (Absolute) 0.00 K/uL   POCT glucose device results    Collection Time: 06/02/22  7:41 AM   Result Value Ref Range    POC Glucose, Blood 107 (H) 65 - 99 mg/dL       Current Facility-Administered Medications   Medication Frequency   • fluconazole (DIFLUCAN) tablet 200 mg Once   • [START ON 6/3/2022] fluconazole (DIFLUCAN) tablet 100 mg DAILY   • miconazole 2%-zinc oxide (Vinny) topical cream BID   • vitamin D3 (cholecalciferol) tablet 1,000 Units DAILY   • Respiratory Therapy Consult Continuous RT   • Pharmacy Consult Request ...Pain Management Review 1 Each PHARMACY TO DOSE   • hydrALAZINE (APRESOLINE) tablet 25 mg Q8HRS PRN   • acetaminophen (Tylenol) tablet 650 mg Q4HRS PRN   • senna-docusate (PERICOLACE or SENOKOT S) 8.6-50 MG per tablet 2 Tablet BID    And   • polyethylene glycol/lytes (MIRALAX) PACKET 1 Packet QDAY PRN    And   • magnesium hydroxide (MILK OF MAGNESIA) suspension 30 mL QDAY PRN    And   • bisacodyl (DULCOLAX) suppository 10 mg QDAY PRN   • omeprazole (PRILOSEC) capsule 20 mg DAILY   • artificial tears ophthalmic solution 1 Drop PRN   • benzocaine-menthol (Cepacol) lozenge 1 Lozenge Q2HRS PRN   • mag hydrox-al hydrox-simeth (MAALOX PLUS ES or MYLANTA DS) suspension 20 mL Q2HRS PRN   • ondansetron (ZOFRAN ODT) dispertab 4 mg 4X/DAY PRN    Or   • ondansetron (ZOFRAN) syringe/vial injection 4 mg 4X/DAY PRN   • traZODone (DESYREL) tablet 50 mg QHS PRN   • sodium chloride (OCEAN) 0.65 % nasal spray 2 Spray PRN   • oxyCODONE immediate-release (ROXICODONE) tablet 5 mg Q3HRS PRN    Or   • oxyCODONE immediate release (ROXICODONE) tablet 10 mg Q3HRS PRN   • midazolam (VERSED) 5 mg/mL (1 mL vial) PRN   • acetaminophen (Tylenol) tablet 650 mg Q6HRS PRN   • allopurinol (ZYLOPRIM) tablet 100 mg QDAY   • atorvastatin (LIPITOR) tablet 80 mg Q EVENING   • ceFAZolin  (Ancef) 2 g in  mL IVPB Q8HRS   • clopidogrel (PLAVIX) tablet 75 mg QDAY   • FLUoxetine (PROZAC) capsule 40 mg DAILY   • gabapentin (NEURONTIN) capsule 100 mg QHS PRN   • insulin GLARGINE (Lantus,Semglee) injection Q EVENING   • insulin regular (HumuLIN R,NovoLIN R) injection 4X/DAY ACHS    And   • dextrose 50% (D50W) injection 25 g Q15 MIN PRN   • losartan (COZAAR) tablet 50 mg BID   • metoprolol SR (TOPROL XL) tablet 100 mg Q EVENING   • rivaroxaban (XARELTO) tablet 10 mg DAILY AT 1800   • tamsulosin (FLOMAX) capsule 0.4 mg DAILY       Orders Placed This Encounter   Procedures   • Diet Order Diet: Cardiac; Second Modifier: (optional): Consistent CHO (Diabetic)     Standing Status:   Standing     Number of Occurrences:   1     Order Specific Question:   Diet:     Answer:   Cardiac [6]     Order Specific Question:   Second Modifier: (optional)     Answer:   Consistent CHO (Diabetic) [4]       IDT Team Meeting 6/2/2022    IMichelle D.O., was present and led the interdisciplinary team conference on 6/2/2022.  I led the IDT conference and agree with the IDT conference documentation and plan of care as noted below.     RN:  Diet    % Meal     Pain    Sleep Adequate    Bowel BM 6/2    Bladder Non continent    In's & Out's      PT:  Bed Mobility Max A x 1    Transfers Total A  (max x1, SBA x 1)    Mobility Total A x2    Stairs      OT:  Eating Supervision    Grooming Supervision    Bathing    UB Dressing Total A    LB Dressing Total A    Toileting Incontinent    Shower & Transfer      SLP:  Severe cog impairment, poor orientation, poor memory and attention   Swallow eval completed, discharged from [facility]       CM:  Continues to work on disposition and DME needs.      DC/Disposition:  6/21 to home with wife with assistance, and          Assessment:  Active Hospital Problems    Diagnosis    • *Acute encephalopathy    • Hyponatremia    • Dehydration    • Essential hypertension, benign    • Benign prostatic  hyperplasia with urinary obstruction    • Diarrhea    • GERD with esophagitis    • Recurrent UTI    • Hypomagnesemia    • Type 2 diabetes mellitus, with long-term current use of insulin (HCC)    • Stage 3b chronic kidney disease (HCC)        Medical Decision Making and Plan:  Acute toxic encephalopathy   - Patient with UTI with urosepsis s/p IV antibiotics. Patient has urinary retention and requires occasional catheterization putting him at risk for recurrent UTIs. 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 On Plavix and statin     HTN/CAD - Patient on Plavix. Patient on Losartan 50 mg BID, Metoprolol 100 mg XL  -Consult Hospitalist     HLD - Patient on Atorvastatin 80 mg QHS     Leukocytosis   - On treatment for UTI. On Ancef with recommendation to switch to Augment.   -Repeat 16.5 up from 16.1, -> improved to  14.6 on 6/2, remains afebrile   - reviewed abx with pharmacy, plan to complete Ancef     Anemia - Check AM CBC - 12.2  -> 12.0 6/2      DM2 with hyperglycemia - Patient on Glargine 11 U and SSI  -Consult hospitalist     Depression - Patient on Fluoxetine 40 mg daily   - 6/2 : tearful about being in rehab today      Hx of Gout - Patient on Allopurinol 100 mg daily     Urinary Retention - Patient requiring catheterization ~1 time daily. Follows with Urology. Continue Flomax  - PVR values 200, is incontinent at baseline      Vitamin D Deficiency - 29 on admission. Start 1000 U     DVT Ppx - Patient on Xarelto ppx dose.     Total time:  40 minutes.  I spent greater than 50% of the time for patient care and coordination on this date, including unit/floor time, and face-to-face time with the patient as per assessment and plan above.  Discussion included admission labs, vitamin D deficiency, worsening leukocytosis, ongoing  confusion, and continue IV abx, and leading team conference.     Michelle Zepeda D.O.

## 2022-06-02 NOTE — CARE PLAN
Problem: Bathing  Goal: STG-Within one week, patient will bathe w/ max A using DME as needed.   Outcome: Not Met     Problem: Dressing  Goal: STG-Within one week, patient will dress UB w/ mod A.  Outcome: Not Met  Goal: STG-Within one week, patient will dress LB w/ mod A.   Outcome: Not Met     Problem: Toileting  Goal: STG-Within one week, patient will complete toileting tasks w/ max A using DME as needed.   Outcome: Not Met     Problem: Functional Transfers  Goal: STG-Within one week, patient will transfer to toilet w/ max A using DME as needed.  Outcome: Not Met  Goal: STG-Within one week, patient will transfer to step in shower with max A using DME as needed.  Outcome: Not Met

## 2022-06-02 NOTE — CARE PLAN
"  Problem: Fall Risk - Rehab  Goal: Patient will remain free from falls  Outcome: Progressing  Note: Camilla Gruber Fall risk Assessment Score: 21    High fall risk Interventions   - Alarming seatbelt  - Bed and strip alarm   - Yellow sign by the door   - Yellow wrist band \"Fall risk\"  - Room near to the nurse station  - Do not leave patient unattended in the bathroom  - Fall risk education provided       Problem: Diabetes Management  Goal: Patient's ability to maintain appropriate glucose levels will be maintained or improve  Outcome: Progressing  Note: F/s monitored= HS result- 130, no coverage needed. .No s/s of hypo and hyperglycemia noted.     The patient is Stable - Low risk of patient condition declining or worsening    Shift Goals  Clinical Goals: Safety  Patient Goals: Sleep well    Progress made toward(s) clinical / shift goals:  Pt free from fall and injury.    "

## 2022-06-02 NOTE — THERAPY
Speech Language Pathology   Initial Assessment     Patient Name: Av Bob  AGE:  77 y.o., SEX:  male  Medical Record #: [id number]  Today's Date: 6/2/2022     Subjective    Swallow evaluation ordered due to pt demonstrating difficulty while taking pills during his cognitive evaluation yesterday.  Per nursing pt was taking medications with straw sips of thin liquids and had a coughing episode.  Nursing reported other than this episode pt tolerated all medications and meals without difficulty.       Objective       06/02/22 0801   Tracheostomy   Tracheostomy No   Speech Mechanisms / Voice Production   Speech Mechanisms / Voice Production (WDL) WDL   Labial Function   Labial Function (WDL) WDL   Lingual Function   Lingual Function (WDL) WDL   Jaw Function   Jaw Function (WDL) WDL   Velar Function   Velar Function (WDL) WDL   Laryngeal Function   Laryngeal Function (WDL) WDL   Swallowing   Swallowing (WDL) WDL   Dysphagia Strategies / Recommendations   Strategies / Interventions Recommended (Yes / No) No   Barriers To Oral Feeding   Barriers To Oral Feeding None   Swallowing/Nutritional Status   Swallowing/Nutritional Status Regular food   Speech Language Pathologist Assigned   Assigned SLP / Extension CW 60 SPLIT OK   SLP Total Time Spent   SLP Individual Total Time Spent (Mins) 30   Evaluation Charges   SLP Oral Pharyngeal Evaluation Oral Pharyngeal Evaluation       Assessment    Patient is 77 y.o. male with a diagnosis of Encephalopathy.  Additional factors influencing patient status/progress (ie: cognitive factors, co-morbidities, social support, etc): severe cognitive deficits.      Clinical swallow evaluation completed.  Pt was admitted on a 7- Regular Texture diet with 0-Thin liquids and per chart review had an MBSS completed in April 2021 due to c/o difficulty swallowing.  This MBSS revealed reflux with a recommendation to follow-up with GI and continue on a 7- Regular Texture diet and 0-Thin  liquids.  Oral [facility] examination completed, all oral structures were WFL's in terms of strength and ROM.  Pt consumed 7- Regular Textures and 0-Thin liquids without s/sx of aspiration.  Nursing reported pt was able to tolerate his medications whole with thin liquids this morning without difficulty.  Recommend pt continue on a 7- Regular Texture diet and 0-Thin liquids with medications whole.  No further ST recommended for dysphagia management.  Pt will be discharged from [facility].  ST will continue to follow for cognition.      Plan  Recommend Speech Therapy  0  minutes per day  0  days per week for 0 days for the following treatments:  No further ST recommended for dysphagia management.  ST will continue to follow for cognition.      Speech Therapy Problems (Active)       Problem: Memory STGs       Dates: Start: 06/01/22         Goal: STG-Within one week, patient will remember safety precautions related to hospitalization with 75% accuracy.        Dates: Start: 06/01/22         Goal Note filed on 06/01/22 1448 by Harpal Williamson MS,CCC-SLP       Pt evaluated on 6/1, continue to target                 Problem: Problem Solving STGs       Dates: Start: 06/01/22         Goal: STG-Within one week, patient will complete SCCAN with appropriate goals to follow        Dates: Start: 06/01/22         Goal Note filed on 06/01/22 1448 by Harpal Williamson MS,CCC-SLP       Pt evaluated on 6/1, continue to target              Goal: STG-Within one week, patient will complete o-log with a final score of 25/30 over three consecutive days       Dates: Start: 06/01/22         Goal Note filed on 06/01/22 1448 by Harpal Williamson MS,CCC-SLP       Pt evaluated on 6/1, continue to target                 Problem: Speech/Swallowing LTGs       Dates: Start: 06/01/22         Goal: LTG-By discharge, patient will solve basic problems in order to d/c home with SPV       Dates: Start: 06/01/22

## 2022-06-02 NOTE — THERAPY
Speech Language Pathology  Daily Treatment     Patient Name: Av Bob  Age:  77 y.o., Sex:  male  Medical Record #: [id number]  Today's Date: 6/2/2022     Precautions  Precautions: Fall Risk, Swallow Precautions ( See Comments)  Comments: L eric from previous CVA    Subjective    Pt willing to participate in this ST session      Objective       06/02/22 0831   SCCAN (Scales of Cognitive and Communicative Ability for Neurorehabilitation)   Oral Expression - Raw Score 9   Oral Expression - Scale Performance Score 47   Orientation - Raw Score 4   Orientation - Scale Performance Score 33   Memory - Raw Score 4   Memory - Scale Performance Score 21   Speech Comprehension - Raw Score 10   Speech Comprehension - Scale Performance Score 77   Reading Comprehension - Raw Score 6   Reading Comprehension - Scale Performance Score 50   Writing - Raw Score 3   Writing - Scale Performance Score 43   Attention - Raw Score 3   Attention - Scale Performance Score 19   Problem Solving - Raw Score 6   Problem Solving - Scale Performance Score 26   SCCAN Total Raw Score 40   SCCAN Degree of Severity Severe Impairment   SLP Total Time Spent   SLP Individual Total Time Spent (Mins) 30   Treatment Charges   SLP Cognitive Skill Development First 15 Minutes 1   SLP Cognitive Skill Development Additional 15 Minutes 1       Assessment    Completed administration of the SCCAN, pt achieved an overall score of 40/94 indicating severe cognitive deficits.  Pt noted to have the most difficulty in the following areas: Attention- 19%, Memory- 21%, Problem Solving- 26%, Orientation- 33%, writing- 43%, and oral expression- 47%.           Plan    Target orientation, simple attention       Speech Therapy Problems (Active)       Problem: Memory STGs       Dates: Start: 06/01/22         Goal: STG-Within one week, patient will remember safety precautions related to hospitalization with 75% accuracy.        Dates: Start: 06/01/22         Goal Note  filed on 06/01/22 1448 by Harpal Williamson MS,CCC-SLP       Pt evaluated on 6/1, continue to target                 Problem: Problem Solving STGs       Dates: Start: 06/01/22         Goal: STG-Within one week, patient will complete SCCAN with appropriate goals to follow        Dates: Start: 06/01/22         Goal Note filed on 06/01/22 1448 by Harpal Williamson MS,CCC-SLP       Pt evaluated on 6/1, continue to target              Goal: STG-Within one week, patient will complete o-log with a final score of 25/30 over three consecutive days       Dates: Start: 06/01/22         Goal Note filed on 06/01/22 1448 by Harpal Williamson MS,CCC-SLP       Pt evaluated on 6/1, continue to target                 Problem: Speech/Swallowing LTGs       Dates: Start: 06/01/22         Goal: LTG-By discharge, patient will solve basic problems in order to d/c home with SPV       Dates: Start: 06/01/22

## 2022-06-02 NOTE — CARE PLAN
"  Problem: Knowledge Deficit - Standard  Goal: Patient and family/care givers will demonstrate understanding of plan of care, disease process/condition, diagnostic tests and medications  Outcome: Progressing     Problem: Fall Risk - Rehab  Goal: Patient will remain free from falls  Outcome: Progressing  Camilla Gruber Fall risk Assessment Score: 21    High fall risk Interventions   - Alarming seatbelt  - Bed and strip alarm   - Yellow sign by the door   - Yellow wrist band \"Fall risk\"  - Room near to the nurse station  - Do not leave patient unattended in the bathroom  - Fall risk education provided                   "

## 2022-06-02 NOTE — CARE PLAN
Problem: Mobility  Goal: STG-Within one week, patient will tolerate gait assessment  Outcome: Met     Problem: Balance  Goal: STG-Within one week, patient will tolerate Function in Sitting Test assessment.  Outcome: Progressing     Problem: Mobility  Goal: STG-Within one week, patient will propel wheelchair household distances 50 ft with Min A.  Outcome: Progressing     Problem: Mobility Transfers  Goal: STG-Within one week, patient will perform bed mobility with Mod A and bed functions as needed.  Outcome: Progressing  Goal: STG-Within one week, patient will transfer bed to chair with Max Ax1 via squat/stand pivot.  Outcome: Progressing     Evaluation completed yesterday, 6/1/22. Goals still appropriate.

## 2022-06-02 NOTE — THERAPY
Occupational Therapy  Daily Treatment     Patient Name: Av Bob  Age:  77 y.o., Sex:  male  Medical Record #: [id number]  Today's Date: 6/2/2022     Precautions  Precautions: Fall Risk, Swallow Precautions ( See Comments)  Comments: L eric from previous CVA, tdine         Subjective    Pt agreeable to shower for OT session     Objective       06/02/22 1401   Precautions   Precautions Fall Risk;Swallow Precautions ( See Comments)   Comments L eric from previous CVA, tdine   Functional Level of Assist   Bathing Total Assist   Bathing Description Grab bar;Hand held shower;Tub bench;Assit with back;Assit wtih lower extremities;Assit with perineal;Increased time;Initial preparation for task;Set-up of equipment   Upper Body Dressing Total Assist   Upper Body Dressing Description Increased time;Initial preparation for task;Assist with pulling shirt over head;Assit with threading arms through sleeves   Lower Body Dressing Total Assist x 2   Lower Body Dressing Description Grab bar;Increased time;Initial preparation for task;Set-up of equipment;Verbal cueing   Toileting Total Assist x 2   Toileting Description Assist for hygiene;Assist to pull pants up;Assist to pull pants down;Assist for standing balance;Grab bar;Increased time   Bed, Chair, Wheelchair Transfer Total Assist X 2   Bed Chair Wheelchair Transfer Description Increased time;Initial preparation for task;Requires lift;Squat pivot transfer to wheelchair;Verbal cueing   Toilet Transfers Total Assist X 2   Toilet Transfer Description Grab bar;Increased time;Supervision for safety;Verbal cueing;Requires lift;Initial preparation for task   Tub / Shower Transfers Total Assist X 2   Tub Shower Transfer Description Grab bar;Shower bench;Increased time;Verbal cueing;Initial preparation for task;Set-up of equipment   Interdisciplinary Plan of Care Collaboration   IDT Collaboration with  Therapy [facility]   Patient Position at End of Therapy Seated;Phone within  Reach;Tray Table within Reach;Call Light within Reach   Collaboration Comments Assist w/ care   OT Total Time Spent   OT Individual Total Time Spent (Mins) 60   OT Charge Group   OT Self Care / ADL 4       Assessment    Pt tolerated session poorly due to fatigue and weakness. Pt required Total A X2 for all adls and transfers during session. Pt was incontinent of bowels and bladder and blood was present in stool and while cleaning pt. Pt's nurse was notified.  Strengths: Motivated for self care and independence, Pleasant and cooperative, Supportive family, Willingly participates in therapeutic activities  Barriers: Confused, Decreased endurance, Fatigue, Generalized weakness, Impaired activity tolerance, Impaired balance, Impaired functional cognition, Limited mobility    Plan    ADLs, standing tolerance, endurance, strengthening     Passport items to be completed:  Perform bathroom transfers, complete dressing, complete feeding, get ready for the day, prepare a simple meal, participate in household tasks, adapt home for safety needs, demonstrate home exercise program, complete caregiver training     Occupational Therapy Goals (Active)       Problem: Bathing       Dates: Start: 06/01/22         Goal: STG-Within one week, patient will bathe w/ max A using DME as needed.        Dates: Start: 06/01/22               Problem: Dressing       Dates: Start: 06/01/22         Goal: STG-Within one week, patient will dress UB w/ mod A.       Dates: Start: 06/01/22            Goal: STG-Within one week, patient will dress LB w/ mod A.        Dates: Start: 06/01/22               Problem: Functional Transfers       Dates: Start: 06/01/22         Goal: STG-Within one week, patient will transfer to toilet w/ max A using DME as needed.       Dates: Start: 06/01/22            Goal: STG-Within one week, patient will transfer to step in shower with max A using DME as needed.       Dates: Start: 06/01/22               Problem: OT Long Term  Goals       Dates: Start: 06/01/22         Goal: LTG-By discharge, patient will complete basic self care tasks with min A using DME and AE as needed.       Dates: Start: 06/01/22            Goal: LTG-By discharge, patient will perform bathroom transfers w/ min A using DME and AE as needed.       Dates: Start: 06/01/22               Problem: Toileting       Dates: Start: 06/01/22         Goal: STG-Within one week, patient will complete toileting tasks w/ max A using DME as needed.        Dates: Start: 06/01/22

## 2022-06-02 NOTE — THERAPY
Physical Therapy   Daily Treatment     Patient Name: Av Bob  Age:  77 y.o., Sex:  male  Medical Record #: [id number]  Today's Date: 6/2/2022     Precautions  Precautions: (P) Fall Risk, Swallow Precautions ( See Comments)  Comments: (P) L eric from previous CVA, tdine    Subjective    Patient in w/c in visible discomfort, agreeable to therapy.     Objective       06/02/22 1031   Precautions   Precautions Fall Risk;Swallow Precautions ( See Comments)   Comments L eric from previous CVA, tdine   Pain 0 - 10 Group   Location Back   Location Orientation Posterior;Lower   Gait Functional Level of Assist    Gait Level Of Assist Total Assist X 2  (Max-Mod Ax1, second person for w/c follow)   Assistive Device   (R wall rail, L AFO)   Distance (Feet) 5   # of Times Distance was Traveled 1   Deviation Step To;Decreased Base Of Support;Shuffled Gait;Decreased Heel Strike;Decreased Toe Off  (posterior lean, tendency to step on feet due to narrow ALLEN)   Transfer Functional Level of Assist   Bed, Chair, Wheelchair Transfer Total Assist  (Max Ax1, SBAx1)   Bed Chair Wheelchair Transfer Description Increased time;Initial preparation for task;Squat pivot transfer to wheelchair;Supervision for safety;Verbal cueing   Bed Mobility    Supine to Sit Maximal Assist   Sit to Supine Maximal Assist   Sit to Stand Maximal Assist   Scooting Maximal Assist   Rolling Minimal Assist to Rt.  (SBA to left; max cues)   Interdisciplinary Plan of Care Collaboration   IDT Collaboration with  Family / Caregiver;Therapy Tech;Certified Nursing Assistant;Nursing   Patient Position at End of Therapy Seated;Family / Friend in Room  (with therapy tech returning to room due to bladder incontinence)   Collaboration Comments CLOF, pain medication, bladder incontinence; therapy tech assisting with session, wife present for session   PT Total Time Spent   PT Individual Total Time Spent (Mins) 60   PT Charge Group   PT Neuromuscular Re-Education /  Balance 2   PT Therapeutic Activities 2     Squat pivot transfer to R w/c>therapy mat and to L therapy mat>w/c with Max Ax1, SBAx1. Guided anterior reaching RUE toward floor to encourage hip flexion and anterior weight shift 1x5.     Supine with BLEs on bolster, BLE hip IR/ER to reduce spasticity and pain mangement. LTRs with overpressure 1x10 each direction.    Blocked rolling 1x5 each direction with visual cue to reach toward therapist's hand.    Donning of shoes and L AFO with total assistance.    Assessment    Patient tolerated session well, limited by bladder incontinence at end of session limiting ambulation. Patient with visible signs of anxiety during ambulation, with wife confirming that patient gets anxious at times when ambulating if he feels like he is going to fall. Initiated discussion and education about serial casting to address contractures and spasticity.    Strengths: Able to follow instructions, Motivated for self care and independence, Pleasant and cooperative, Supportive family, Willingly participates in therapeutic activities  Barriers: Bladder incontinence, Decreased endurance, Hemiparesis, Impaired activity tolerance, Impaired balance, Limited mobility    Plan    Bed mobility training, transfer training (squat pivot currently), standing tolerance/balance, Function in Sitting Test, gait training (currently using R wall rail), ROM/stretching, neuro re-ed for L hemibody.    Passport items to be completed:  Get in/out of bed safely, in/out of a vehicle, safely use mobility device, walk or wheel around home/community, navigate up and down stairs, show how to get up/down from the ground, ensure home is accessible, demonstrate HEP, complete caregiver training    Physical Therapy Problems (Active)       Problem: Balance       Dates: Start: 06/01/22         Goal: STG-Within one week, patient will tolerate Function in Sitting Test assessment.       Dates: Start: 06/01/22               Problem:  Mobility       Dates: Start: 06/01/22         Goal: STG-Within one week, patient will propel wheelchair household distances 50 ft with Min A.       Dates: Start: 06/01/22            Goal: STG-Within one week, patient will tolerate gait assessment       Dates: Start: 06/01/22               Problem: Mobility Transfers       Dates: Start: 06/01/22         Goal: STG-Within one week, patient will perform bed mobility with Mod A and bed functions as needed.       Dates: Start: 06/01/22            Goal: STG-Within one week, patient will transfer bed to chair with Max Ax1 via squat/stand pivot.       Dates: Start: 06/01/22               Problem: PT-Long Term Goals       Dates: Start: 06/01/22         Goal: LTG-By discharge, patient will propel wheelchair 50 ft mod I.       Dates: Start: 06/01/22            Goal: LTG-By discharge, patient will ambulate 50 ft with LRAD and Min A.       Dates: Start: 06/01/22            Goal: LTG-By discharge, patient will transfer one surface to another with CGA and LRAD.       Dates: Start: 06/01/22            Goal: LTG-By discharge, patient will transfer in/out of a car with CGA and LRAD.       Dates: Start: 06/01/22            Goal: LTG-By discharge, patient will perform bed mobility independently.       Dates: Start: 06/01/22

## 2022-06-03 LAB
GLUCOSE BLD STRIP.AUTO-MCNC: 125 MG/DL (ref 65–99)
GLUCOSE BLD STRIP.AUTO-MCNC: 141 MG/DL (ref 65–99)
GLUCOSE BLD STRIP.AUTO-MCNC: 182 MG/DL (ref 65–99)
GLUCOSE BLD STRIP.AUTO-MCNC: 201 MG/DL (ref 65–99)

## 2022-06-03 PROCEDURE — 97535 SELF CARE MNGMENT TRAINING: CPT

## 2022-06-03 PROCEDURE — 97129 THER IVNTJ 1ST 15 MIN: CPT

## 2022-06-03 PROCEDURE — 700102 HCHG RX REV CODE 250 W/ 637 OVERRIDE(OP): Performed by: PHYSICAL MEDICINE & REHABILITATION

## 2022-06-03 PROCEDURE — 700102 HCHG RX REV CODE 250 W/ 637 OVERRIDE(OP): Performed by: HOSPITALIST

## 2022-06-03 PROCEDURE — A9270 NON-COVERED ITEM OR SERVICE: HCPCS | Performed by: HOSPITALIST

## 2022-06-03 PROCEDURE — 97130 THER IVNTJ EA ADDL 15 MIN: CPT

## 2022-06-03 PROCEDURE — 97116 GAIT TRAINING THERAPY: CPT

## 2022-06-03 PROCEDURE — 99232 SBSQ HOSP IP/OBS MODERATE 35: CPT | Performed by: PHYSICAL MEDICINE & REHABILITATION

## 2022-06-03 PROCEDURE — 770010 HCHG ROOM/CARE - REHAB SEMI PRIVAT*

## 2022-06-03 PROCEDURE — 700111 HCHG RX REV CODE 636 W/ 250 OVERRIDE (IP): Performed by: PHYSICAL MEDICINE & REHABILITATION

## 2022-06-03 PROCEDURE — 700105 HCHG RX REV CODE 258: Performed by: PHYSICAL MEDICINE & REHABILITATION

## 2022-06-03 PROCEDURE — 99232 SBSQ HOSP IP/OBS MODERATE 35: CPT | Performed by: HOSPITALIST

## 2022-06-03 PROCEDURE — 82962 GLUCOSE BLOOD TEST: CPT

## 2022-06-03 PROCEDURE — A9270 NON-COVERED ITEM OR SERVICE: HCPCS | Performed by: PHYSICAL MEDICINE & REHABILITATION

## 2022-06-03 PROCEDURE — 97530 THERAPEUTIC ACTIVITIES: CPT

## 2022-06-03 PROCEDURE — 700101 HCHG RX REV CODE 250: Performed by: PHYSICAL MEDICINE & REHABILITATION

## 2022-06-03 RX ORDER — LOPERAMIDE HYDROCHLORIDE 2 MG/1
2 CAPSULE ORAL 4 TIMES DAILY PRN
Status: DISCONTINUED | OUTPATIENT
Start: 2022-06-03 | End: 2022-06-12

## 2022-06-03 RX ADMIN — MICONAZOLE NITRATE: 20 CREAM TOPICAL at 21:19

## 2022-06-03 RX ADMIN — LIDOCAINE 1 PATCH: 50 PATCH TOPICAL at 14:55

## 2022-06-03 RX ADMIN — METOPROLOL SUCCINATE 100 MG: 100 TABLET, FILM COATED, EXTENDED RELEASE ORAL at 21:04

## 2022-06-03 RX ADMIN — SENNOSIDES AND DOCUSATE SODIUM 2 TABLET: 50; 8.6 TABLET ORAL at 09:15

## 2022-06-03 RX ADMIN — ACETAMINOPHEN 650 MG: 325 TABLET ORAL at 15:36

## 2022-06-03 RX ADMIN — INSULIN HUMAN 2 UNITS: 100 INJECTION, SOLUTION PARENTERAL at 11:37

## 2022-06-03 RX ADMIN — INSULIN HUMAN 1 UNITS: 100 INJECTION, SOLUTION PARENTERAL at 21:07

## 2022-06-03 RX ADMIN — TAMSULOSIN HYDROCHLORIDE 0.4 MG: 0.4 CAPSULE ORAL at 09:15

## 2022-06-03 RX ADMIN — OMEPRAZOLE 20 MG: 20 CAPSULE, DELAYED RELEASE ORAL at 09:15

## 2022-06-03 RX ADMIN — INSULIN GLARGINE-YFGN 11 UNITS: 100 INJECTION, SOLUTION SUBCUTANEOUS at 21:08

## 2022-06-03 RX ADMIN — RIVAROXABAN 10 MG: 10 TABLET, FILM COATED ORAL at 17:27

## 2022-06-03 RX ADMIN — ATORVASTATIN CALCIUM 80 MG: 40 TABLET, FILM COATED ORAL at 21:05

## 2022-06-03 RX ADMIN — CEFAZOLIN 2 G: 2 INJECTION, POWDER, FOR SOLUTION INTRAMUSCULAR; INTRAVENOUS at 05:54

## 2022-06-03 RX ADMIN — ALLOPURINOL 100 MG: 100 TABLET ORAL at 05:54

## 2022-06-03 RX ADMIN — FLUCONAZOLE 100 MG: 100 TABLET ORAL at 09:14

## 2022-06-03 RX ADMIN — CEFAZOLIN 2 G: 2 INJECTION, POWDER, FOR SOLUTION INTRAMUSCULAR; INTRAVENOUS at 21:30

## 2022-06-03 RX ADMIN — SENNOSIDES AND DOCUSATE SODIUM 2 TABLET: 50; 8.6 TABLET ORAL at 21:05

## 2022-06-03 RX ADMIN — MICONAZOLE NITRATE: 20 CREAM TOPICAL at 09:18

## 2022-06-03 RX ADMIN — LOSARTAN POTASSIUM 50 MG: 25 TABLET, FILM COATED ORAL at 09:14

## 2022-06-03 RX ADMIN — FLUOXETINE 40 MG: 20 CAPSULE ORAL at 09:15

## 2022-06-03 RX ADMIN — CEFAZOLIN 2 G: 2 INJECTION, POWDER, FOR SOLUTION INTRAMUSCULAR; INTRAVENOUS at 14:55

## 2022-06-03 RX ADMIN — CLOPIDOGREL 75 MG: 75 TABLET, FILM COATED ORAL at 05:54

## 2022-06-03 RX ADMIN — Medication 1000 UNITS: at 09:14

## 2022-06-03 ASSESSMENT — ENCOUNTER SYMPTOMS
NAUSEA: 0
SHORTNESS OF BREATH: 0
DIARRHEA: 0
VOMITING: 0
ABDOMINAL PAIN: 0
CHILLS: 0
FEVER: 0
NERVOUS/ANXIOUS: 0

## 2022-06-03 ASSESSMENT — PAIN DESCRIPTION - PAIN TYPE: TYPE: ACUTE PAIN

## 2022-06-03 ASSESSMENT — ACTIVITIES OF DAILY LIVING (ADL)
TOILETING_LEVEL_OF_ASSIST_DESCRIPTION: ASSIST FOR HYGIENE;ASSIST TO PULL PANTS UP;ASSIST TO PULL PANTS DOWN;GRAB BAR;INCREASED TIME;INITIAL PREPARATION FOR TASK;SET-UP OF EQUIPMENT;SUPERVISION FOR SAFETY;VERBAL CUEING
BED_CHAIR_WHEELCHAIR_TRANSFER_DESCRIPTION: INCREASED TIME;INITIAL PREPARATION FOR TASK;SQUAT PIVOT TRANSFER TO WHEELCHAIR;SUPERVISION FOR SAFETY;VERBAL CUEING

## 2022-06-03 ASSESSMENT — GAIT ASSESSMENTS
GAIT LEVEL OF ASSIST: TOTAL ASSIST X 2
DEVIATION: STEP TO;DECREASED BASE OF SUPPORT;SHUFFLED GAIT;DECREASED HEEL STRIKE;DECREASED TOE OFF

## 2022-06-03 NOTE — THERAPY
"Occupational Therapy  Daily Treatment     Patient Name: Av Bob  Age:  77 y.o., Sex:  male  Medical Record #: [id number]  Today's Date: 6/3/2022     Precautions  Precautions: Fall Risk, Swallow Precautions ( See Comments)  Comments: L eric from previous CVA, tdine         Subjective    \"It just hurts.\" Pt reported about pain in abdomen during movement      Objective       06/03/22 0831   Pain   Intervention Distraction;Emotional Support;Repositioned   Pain 0 - 10 Group   Location Abdomen   Pain Rating Scale (NPRS) 4   Description Aching   Comfort Goal Comfort with Movement;Perform Activity;Sleep Comfortably   Therapist Pain Assessment During Activity   Cognition    Level of Consciousness Alert   Functional Level of Assist   Grooming Supervision   Grooming Description Increased time;Initial preparation for task;Seated in wheelchair at sink;Set-up of equipment;Supervision for safety  (brush teeth; set-up needed for putting toothpaste on toothbrush)   Lower Body Dressing Total Assist x 2   Lower Body Dressing Description Assist with threading into pant leg;Increased time;Initial preparation for task;Set-up of equipment;Supervision for safety;Verbal cueing  (donning pants while in bed; changing brief in bed rolling side to side due to incontinence)   Toileting Total Assist x 2   Toileting Description Assist for hygiene;Assist to pull pants up;Assist to pull pants down;Grab bar;Increased time;Initial preparation for task;Set-up of equipment;Supervision for safety;Verbal cueing  (incontinent of bowels)   Bed, Chair, Wheelchair Transfer Total Assist X 2   Bed Chair Wheelchair Transfer Description Increased time;Initial preparation for task;Requires lift;Set-up of equipment;Squat pivot transfer to wheelchair;Verbal cueing;Supervision for safety  (EOB to w/c going to R side)   Bed Mobility    Supine to Sit Total Assist   Scooting Maximal Assist   Rolling Maximal Assist to Rt.;Maximum Assist to Lt. "   Interdisciplinary Plan of Care Collaboration   Patient Position at End of Therapy Seated;Chair Alarm On;Self Releasing Lap Belt Applied;Other (Comments)  (handoff to SLP)   OT Total Time Spent   OT Individual Total Time Spent (Mins) 60   OT Charge Group   OT Self Care / ADL 4       Assessment    Pt tolerated session fair. Pt incontinent of bowels while in bed- increased time taken to change brief/clean up pt while supine bed. Pt assisted with rolling but still having increased difficulty rolling to L side vs R side but still requires max a. Pt required increased assist for bed mobility supine to sit 2/2 pain in abdomen during movement. Pt reported that the muscles hurt but was unable to describe what kind of pain. Pt still requires 2 people for safe xfer's going to strong R side- squat pivot.     Strengths: Motivated for self care and independence, Pleasant and cooperative, Supportive family, Willingly participates in therapeutic activities  Barriers: Confused, Decreased endurance, Fatigue, Generalized weakness, Impaired activity tolerance, Impaired balance, Impaired functional cognition, Limited mobility    Plan    ADLs, standing tolerance, endurance, strengthening     Occupational Therapy Goals (Active)       Problem: Bathing       Dates: Start: 06/01/22         Goal: STG-Within one week, patient will bathe w/ max A using DME as needed.        Dates: Start: 06/01/22               Problem: Dressing       Dates: Start: 06/01/22         Goal: STG-Within one week, patient will dress UB w/ mod A.       Dates: Start: 06/01/22            Goal: STG-Within one week, patient will dress LB w/ mod A.        Dates: Start: 06/01/22               Problem: Functional Transfers       Dates: Start: 06/01/22         Goal: STG-Within one week, patient will transfer to toilet w/ max A using DME as needed.       Dates: Start: 06/01/22            Goal: STG-Within one week, patient will transfer to step in shower with max A using DME  as needed.       Dates: Start: 06/01/22               Problem: OT Long Term Goals       Dates: Start: 06/01/22         Goal: LTG-By discharge, patient will complete basic self care tasks with min A using DME and AE as needed.       Dates: Start: 06/01/22            Goal: LTG-By discharge, patient will perform bathroom transfers w/ min A using DME and AE as needed.       Dates: Start: 06/01/22               Problem: Toileting       Dates: Start: 06/01/22         Goal: STG-Within one week, patient will complete toileting tasks w/ max A using DME as needed.        Dates: Start: 06/01/22

## 2022-06-03 NOTE — PROGRESS NOTES
Rehab Progress Note     Encounter Date: 6/3/2022    CC: Decreased mobility, confusion    Interval Events (Subjective)  Vital signs and labs reviewed, no new labs today. Patient remains afebrile.   Patient seen and examined at bedside with wife and OT.  Patient with significant improvement and tolerance for therapies today patient able to tolerate all 3 disciplines and has had improvement in transfers and mobility.  Patient's wife reports that historically patient has had cognitive decline and confusion while on different antibiotics.  Today patient is oriented, cooperative, nontearful, able to tell me information about his outpatient follow-up with Dr. Huff.  Patient did have an incontinent stooling episode with OT today.,  Discussed this with the wife and hospitalist.  Loose stools suspected to be secondary to the antibiotics.  Otherwise patient is doing well    Does not report HA, lightheadedness, SOB, CP, abdominal pain, or changes in numbness/tingling/weakness.       Objective:  VITAL SIGNS: BP (!) 152/82   Pulse 83   Temp 36.3 °C (97.3 °F) (Oral)   Resp 18   Ht 1.829 m (6')   Wt 85.5 kg (188 lb 7.9 oz)   SpO2 94%   BMI 25.56 kg/m²   Gen: NAD, seated in MWC, finishing OT  Psych: In good spirits, cooperative, oriented  CV: RRR, no edema  Resp: CTAB, no upper airway sounds, no witnessed wheezes or coughing  Extremities: No lower extremity edema  Abd: NTND  Neuro: AOx3, left hemiparesis    Recent Results (from the past 72 hour(s))   POCT glucose device results    Collection Time: 05/31/22 11:57 AM   Result Value Ref Range    POC Glucose, Blood 101 (H) 65 - 99 mg/dL   COV-2, FLU A/B, AND RSV BY PCR (2-4 HOURS CEPHEID): Collect NP swab in VTM    Collection Time: 05/31/22  1:00 PM    Specimen: Respirate   Result Value Ref Range    Influenza virus A RNA Negative Negative    Influenza virus B, PCR Negative Negative    RSV, PCR Negative Negative    SARS-CoV-2 by PCR NotDetected     SARS-CoV-2 Source NP Swab     POCT glucose device results    Collection Time: 05/31/22  5:22 PM   Result Value Ref Range    POC Glucose, Blood 113 (H) 65 - 99 mg/dL   POCT glucose device results    Collection Time: 05/31/22 10:25 PM   Result Value Ref Range    POC Glucose, Blood 150 (H) 65 - 99 mg/dL   CBC with Differential    Collection Time: 06/01/22  6:30 AM   Result Value Ref Range    WBC 16.5 (H) 4.8 - 10.8 K/uL    RBC 4.32 (L) 4.70 - 6.10 M/uL    Hemoglobin 12.2 (L) 14.0 - 18.0 g/dL    Hematocrit 37.2 (L) 42.0 - 52.0 %    MCV 86.1 81.4 - 97.8 fL    MCH 28.2 27.0 - 33.0 pg    MCHC 32.8 (L) 33.7 - 35.3 g/dL    RDW 44.5 35.9 - 50.0 fL    Platelet Count 476 (H) 164 - 446 K/uL    MPV 9.6 9.0 - 12.9 fL    Neutrophils-Polys 82.60 (H) 44.00 - 72.00 %    Lymphocytes 7.00 (L) 22.00 - 41.00 %    Monocytes 4.70 0.00 - 13.40 %    Eosinophils 3.10 0.00 - 6.90 %    Basophils 0.40 0.00 - 1.80 %    Immature Granulocytes 2.20 (H) 0.00 - 0.90 %    Nucleated RBC 0.00 /100 WBC    Neutrophils (Absolute) 13.60 (H) 1.82 - 7.42 K/uL    Lymphs (Absolute) 1.16 1.00 - 4.80 K/uL    Monos (Absolute) 0.78 0.00 - 0.85 K/uL    Eos (Absolute) 0.51 0.00 - 0.51 K/uL    Baso (Absolute) 0.07 0.00 - 0.12 K/uL    Immature Granulocytes (abs) 0.37 (H) 0.00 - 0.11 K/uL    NRBC (Absolute) 0.00 K/uL   Comp Metabolic Panel (CMP)    Collection Time: 06/01/22  6:30 AM   Result Value Ref Range    Sodium 137 135 - 145 mmol/L    Potassium 3.8 3.6 - 5.5 mmol/L    Chloride 106 96 - 112 mmol/L    Co2 20 20 - 33 mmol/L    Anion Gap 11.0 7.0 - 16.0    Glucose 96 65 - 99 mg/dL    Bun 22 8 - 22 mg/dL    Creatinine 1.15 0.50 - 1.40 mg/dL    Calcium 8.5 8.5 - 10.5 mg/dL    AST(SGOT) 17 12 - 45 U/L    ALT(SGPT) 5 2 - 50 U/L    Alkaline Phosphatase 116 (H) 30 - 99 U/L    Total Bilirubin 0.5 0.1 - 1.5 mg/dL    Albumin 2.7 (L) 3.2 - 4.9 g/dL    Total Protein 5.2 (L) 6.0 - 8.2 g/dL    Globulin 2.5 1.9 - 3.5 g/dL    A-G Ratio 1.1 g/dL   HEMOGLOBIN A1C    Collection Time: 06/01/22  6:30 AM   Result  Value Ref Range    Glycohemoglobin 6.6 (H) 4.0 - 5.6 %    Est Avg Glucose 143 mg/dL   TSH with Reflex to FT4    Collection Time: 06/01/22  6:30 AM   Result Value Ref Range    TSH 1.560 0.380 - 5.330 uIU/mL   Vitamin D, 25-hydroxy (blood)    Collection Time: 06/01/22  6:30 AM   Result Value Ref Range    25-Hydroxy   Vitamin D 25 29 (L) 30 - 100 ng/mL   ESTIMATED GFR    Collection Time: 06/01/22  6:30 AM   Result Value Ref Range    GFR (CKD-EPI) 66 >60 mL/min/1.73 m 2   POCT glucose device results    Collection Time: 06/01/22  7:40 AM   Result Value Ref Range    POC Glucose, Blood 95 65 - 99 mg/dL   POCT glucose device results    Collection Time: 06/01/22 11:08 AM   Result Value Ref Range    POC Glucose, Blood 185 (H) 65 - 99 mg/dL   URINALYSIS    Collection Time: 06/01/22  4:00 PM    Specimen: Urine, Clean Catch   Result Value Ref Range    Color Yellow     Character Turbid (A)     Specific Gravity 1.020 <1.035    Ph 5.5 5.0 - 8.0    Glucose Negative Negative mg/dL    Ketones Negative Negative mg/dL    Protein 100 (A) Negative mg/dL    Bilirubin Negative Negative    Urobilinogen, Urine 0.2 Negative    Nitrite Negative Negative    Leukocyte Esterase Moderate (A) Negative    Occult Blood Large (A) Negative    Micro Urine Req Microscopic    URINE MICROSCOPIC (W/UA)    Collection Time: 06/01/22  4:00 PM   Result Value Ref Range    WBC Packed (A) /hpf    RBC 20-50 (A) /hpf    Bacteria Few (A) None /hpf    Epithelial Cells Negative /hpf    Hyphae Yeast Present (A) Absent /hpf   POCT glucose device results    Collection Time: 06/01/22  5:15 PM   Result Value Ref Range    POC Glucose, Blood 91 65 - 99 mg/dL   POCT glucose device results    Collection Time: 06/01/22  9:03 PM   Result Value Ref Range    POC Glucose, Blood 130 (H) 65 - 99 mg/dL   PROCALCITONIN    Collection Time: 06/02/22  5:09 AM   Result Value Ref Range    Procalcitonin 0.04 <0.25 ng/mL   CBC WITH DIFFERENTIAL    Collection Time: 06/02/22  5:09 AM   Result  Value Ref Range    WBC 14.6 (H) 4.8 - 10.8 K/uL    RBC 4.15 (L) 4.70 - 6.10 M/uL    Hemoglobin 12.0 (L) 14.0 - 18.0 g/dL    Hematocrit 36.2 (L) 42.0 - 52.0 %    MCV 87.2 81.4 - 97.8 fL    MCH 28.9 27.0 - 33.0 pg    MCHC 33.1 (L) 33.7 - 35.3 g/dL    RDW 44.6 35.9 - 50.0 fL    Platelet Count 443 164 - 446 K/uL    MPV 9.4 9.0 - 12.9 fL    Neutrophils-Polys 81.80 (H) 44.00 - 72.00 %    Lymphocytes 7.40 (L) 22.00 - 41.00 %    Monocytes 5.60 0.00 - 13.40 %    Eosinophils 3.00 0.00 - 6.90 %    Basophils 0.30 0.00 - 1.80 %    Immature Granulocytes 1.90 (H) 0.00 - 0.90 %    Nucleated RBC 0.00 /100 WBC    Neutrophils (Absolute) 11.92 (H) 1.82 - 7.42 K/uL    Lymphs (Absolute) 1.08 1.00 - 4.80 K/uL    Monos (Absolute) 0.82 0.00 - 0.85 K/uL    Eos (Absolute) 0.44 0.00 - 0.51 K/uL    Baso (Absolute) 0.05 0.00 - 0.12 K/uL    Immature Granulocytes (abs) 0.27 (H) 0.00 - 0.11 K/uL    NRBC (Absolute) 0.00 K/uL   POCT glucose device results    Collection Time: 06/02/22  7:41 AM   Result Value Ref Range    POC Glucose, Blood 107 (H) 65 - 99 mg/dL   POCT glucose device results    Collection Time: 06/02/22 11:17 AM   Result Value Ref Range    POC Glucose, Blood 142 (H) 65 - 99 mg/dL   POCT glucose device results    Collection Time: 06/02/22  5:27 PM   Result Value Ref Range    POC Glucose, Blood 133 (H) 65 - 99 mg/dL   POCT glucose device results    Collection Time: 06/02/22 10:13 PM   Result Value Ref Range    POC Glucose, Blood 147 (H) 65 - 99 mg/dL   POCT glucose device results    Collection Time: 06/03/22  7:23 AM   Result Value Ref Range    POC Glucose, Blood 141 (H) 65 - 99 mg/dL       Current Facility-Administered Medications   Medication Frequency   • fluconazole (DIFLUCAN) tablet 100 mg DAILY   • lidocaine (LIDODERM) 5 % 1 Patch Q24HR   • miconazole 2%-zinc oxide (Vinny) topical cream BID   • vitamin D3 (cholecalciferol) tablet 1,000 Units DAILY   • Respiratory Therapy Consult Continuous RT   • Pharmacy Consult Request ...Pain  Management Review 1 Each PHARMACY TO DOSE   • hydrALAZINE (APRESOLINE) tablet 25 mg Q8HRS PRN   • acetaminophen (Tylenol) tablet 650 mg Q4HRS PRN   • senna-docusate (PERICOLACE or SENOKOT S) 8.6-50 MG per tablet 2 Tablet BID    And   • polyethylene glycol/lytes (MIRALAX) PACKET 1 Packet QDAY PRN    And   • magnesium hydroxide (MILK OF MAGNESIA) suspension 30 mL QDAY PRN    And   • bisacodyl (DULCOLAX) suppository 10 mg QDAY PRN   • omeprazole (PRILOSEC) capsule 20 mg DAILY   • artificial tears ophthalmic solution 1 Drop PRN   • benzocaine-menthol (Cepacol) lozenge 1 Lozenge Q2HRS PRN   • mag hydrox-al hydrox-simeth (MAALOX PLUS ES or MYLANTA DS) suspension 20 mL Q2HRS PRN   • ondansetron (ZOFRAN ODT) dispertab 4 mg 4X/DAY PRN    Or   • ondansetron (ZOFRAN) syringe/vial injection 4 mg 4X/DAY PRN   • traZODone (DESYREL) tablet 50 mg QHS PRN   • sodium chloride (OCEAN) 0.65 % nasal spray 2 Spray PRN   • oxyCODONE immediate-release (ROXICODONE) tablet 5 mg Q3HRS PRN    Or   • oxyCODONE immediate release (ROXICODONE) tablet 10 mg Q3HRS PRN   • midazolam (VERSED) 5 mg/mL (1 mL vial) PRN   • acetaminophen (Tylenol) tablet 650 mg Q6HRS PRN   • allopurinol (ZYLOPRIM) tablet 100 mg QDAY   • atorvastatin (LIPITOR) tablet 80 mg Q EVENING   • ceFAZolin (Ancef) 2 g in  mL IVPB Q8HRS   • clopidogrel (PLAVIX) tablet 75 mg QDAY   • FLUoxetine (PROZAC) capsule 40 mg DAILY   • gabapentin (NEURONTIN) capsule 100 mg QHS PRN   • insulin GLARGINE (Lantus,Semglee) injection Q EVENING   • insulin regular (HumuLIN R,NovoLIN R) injection 4X/DAY ACHS    And   • dextrose 50% (D50W) injection 25 g Q15 MIN PRN   • losartan (COZAAR) tablet 50 mg BID   • metoprolol SR (TOPROL XL) tablet 100 mg Q EVENING   • rivaroxaban (XARELTO) tablet 10 mg DAILY AT 1800   • tamsulosin (FLOMAX) capsule 0.4 mg DAILY       Orders Placed This Encounter   Procedures   • Diet Order Diet: Cardiac; Second Modifier: (optional): Consistent CHO (Diabetic)     Standing  Status:   Standing     Number of Occurrences:   1     Order Specific Question:   Diet:     Answer:   Cardiac [6]     Order Specific Question:   Second Modifier: (optional)     Answer:   Consistent CHO (Diabetic) [4]       IDT Team Meeting 6/2/2022    Michelle SANDOVAL D.O., was present and led the interdisciplinary team conference on 6/2/2022.  I led the IDT conference and agree with the IDT conference documentation and plan of care as noted below.        DC/Disposition:  6/21 to home with wife with assistance, and HH         Assessment:  Active Hospital Problems    Diagnosis    • *Acute encephalopathy    • Hyponatremia    • Dehydration    • Essential hypertension, benign    • Benign prostatic hyperplasia with urinary obstruction    • Diarrhea    • GERD with esophagitis    • Recurrent UTI    • Hypomagnesemia    • Type 2 diabetes mellitus, with long-term current use of insulin (HCC)    • Stage 3b chronic kidney disease (HCC)        Medical Decision Making and Plan:  Acute toxic encephalopathy   - Patient with UTI with urosepsis s/p IV antibiotics. Patient has urinary retention and requires occasional catheterization putting him at risk for recurrent UTIs. With Decreased cognition, balance and strength in setting of previous CVA  -PT and OT for mobility and ADLs  -SLP for cognition  -Follow-up with [facility], patient is followed by Dr. Huff for previous Botox injections.     Previous R CVA - Patient with contracture and spasticity on R side. On Plavix and statin  - Is followed by Dr. Huff     HTN/CAD - Patient on Plavix. Patient on Losartan 50 mg BID, Metoprolol 100 mg XL  -Consult Hospitalist     HLD - Patient on Atorvastatin 80 mg QHS     Leukocytosis   - On treatment for UTI. On Ancef with documentations for recommendations to switch to Augment.   -Repeat 16.5 up from 16.1, -> improved to  14.6 on 6/2, remains afebrile   - reviewed abx with pharmacy and hospitalist, plan to complete Ancef through 6/5 as  patient continues to improve on this current dosage    Anemia - Check AM CBC - 12.2  -> 12.0 6/2      DM2 with hyperglycemia - Patient on Glargine 11 U and SSI  -Consult hospitalist     Depression - Patient on Fluoxetine 40 mg daily   - 6/2 : tearful about being in rehab today      Hx of Gout - Patient on Allopurinol 100 mg daily     Urinary Retention - Patient requiring catheterization ~1 time daily. Follows with Urology. Continue Flomax  - PVR values 200, is incontinent at baseline      Vitamin D Deficiency - 29 on admission. Start 1000 U     DVT Ppx - Patient on Xarelto ppx dose.     Total time:  27  minutes.  I spent greater than 50% of the time for patient care and coordination on this date, including unit/floor time, and face-to-face time with the patient as per assessment and plan above.  Discussion included admission labs, vitamin D deficiency, worsening leukocytosis, and continue IV abx, and reviewing improved cognition with both patient and wife.    Michelle Zepeda D.O.

## 2022-06-03 NOTE — THERAPY
Physical Therapy   Daily Treatment     Patient Name: Av Bob  Age:  77 y.o., Sex:  male  Medical Record #: [id number]  Today's Date: 6/3/2022     Precautions  Precautions: Fall Risk, Swallow Precautions ( See Comments)  Comments: L eric from previous CVA, tdine    Subjective    Patient in bed and agreeable to therapy, wife present for session.     Objective       06/03/22 1331   Precautions   Precautions Fall Risk;Swallow Precautions ( See Comments)   Comments L eric from previous CVA, tdine   Gait Functional Level of Assist    Gait Level Of Assist Total Assist X 2  (Max-Mod Ax1, second person for w/c follow)   Assistive Device   (R wall rail, L AFO)   Distance (Feet)   (8 ftx1, 2 ftx1)   Deviation Step To;Decreased Base Of Support;Shuffled Gait;Decreased Heel Strike;Decreased Toe Off  (posterior lean, tendency to step on feet due to narrow ALLEN)   Transfer Functional Level of Assist   Bed, Chair, Wheelchair Transfer Total Assist X 2  (Max Ax1, Min Ax1)   Bed Chair Wheelchair Transfer Description Increased time;Initial preparation for task;Squat pivot transfer to wheelchair;Supervision for safety;Verbal cueing   Bed Mobility    Supine to Sit Maximal Assist  (with HOB elevated)   Sit to Stand Maximal Assist   Scooting Maximal Assist   Rolling Moderate Assist to Rt.;Moderate Assist to Lt.   PT Total Time Spent   PT Individual Total Time Spent (Mins) 60   PT Charge Group   PT Gait Training 2   PT Therapeutic Activities 2     Rolling several times L/R with bed rail and assist for incontinence management (bowel and bladder). Donning of AFO and shoes with assist in sitting.    Seated anterior roll outs on silver physioball with assist for BUE placement 2x12 to encourage hip flexion and anterior weight shifting for mobility.    Assessment    Patient tolerated session fairly, continues to require encouragement to participate due to fatigue however does participate in activities. Significant assistance for  sit<>stand, fatigues easily.    Strengths: Able to follow instructions, Motivated for self care and independence, Pleasant and cooperative, Supportive family, Willingly participates in therapeutic activities  Barriers: Bladder incontinence, Decreased endurance, Hemiparesis, Impaired activity tolerance, Impaired balance, Limited mobility    Plan    Bed mobility training, transfer training (squat pivot currently), standing tolerance/balance, Function in Sitting Test, gait training (currently using R wall rail), ROM/stretching, neuro re-ed for L hemibody.     Passport items to be completed:  Get in/out of bed safely, in/out of a vehicle, safely use mobility device, walk or wheel around home/community, navigate up and down stairs, show how to get up/down from the ground, ensure home is accessible, demonstrate HEP, complete caregiver training      Physical Therapy Problems (Active)       Problem: Balance       Dates: Start: 06/01/22         Goal: STG-Within one week, patient will tolerate Function in Sitting Test assessment.       Dates: Start: 06/01/22               Problem: Mobility       Dates: Start: 06/01/22         Goal: STG-Within one week, patient will propel wheelchair household distances 50 ft with Min A.       Dates: Start: 06/01/22               Problem: Mobility Transfers       Dates: Start: 06/01/22         Goal: STG-Within one week, patient will perform bed mobility with Mod A and bed functions as needed.       Dates: Start: 06/01/22            Goal: STG-Within one week, patient will transfer bed to chair with Max Ax1 via squat/stand pivot.       Dates: Start: 06/01/22               Problem: PT-Long Term Goals       Dates: Start: 06/01/22         Goal: LTG-By discharge, patient will propel wheelchair 50 ft mod I.       Dates: Start: 06/01/22            Goal: LTG-By discharge, patient will ambulate 50 ft with LRAD and Min A.       Dates: Start: 06/01/22            Goal: LTG-By discharge, patient will  transfer one surface to another with CGA and LRAD.       Dates: Start: 06/01/22            Goal: LTG-By discharge, patient will transfer in/out of a car with CGA and LRAD.       Dates: Start: 06/01/22            Goal: LTG-By discharge, patient will perform bed mobility independently.       Dates: Start: 06/01/22

## 2022-06-03 NOTE — CARE PLAN
"  Problem: Fall Risk - Rehab  Goal: Patient will remain free from falls  Outcome: Progressing  Camilla Gruber Fall risk Assessment Score: 21    High fall risk Interventions   - Alarming seatbelt  - Wander guard  - Bed and strip alarm   - Yellow sign by the door   - Yellow wrist band \"Fall risk\"  - Room near to the nurse station  - Do not leave patient unattended in the bathroom  - Fall risk education provided    Problem: Infection  Goal: Patient will remain free from infection  Outcome: Progressing   Patient verbalized understanding infection prevention education.       "

## 2022-06-03 NOTE — PROGRESS NOTES
[facility] Medicine Daily Progress Note    Date of Service  6/3/2022    Chief Complaint:  Hypertension  Diabetes  Leukocytosis    Interval History:  Discussed with pt wife about his current medical treatment.    Review of Systems  Review of Systems   Constitutional: Negative for chills and fever.   Respiratory: Negative for shortness of breath.    Cardiovascular: Negative for chest pain.   Gastrointestinal: Negative for abdominal pain, diarrhea, nausea and vomiting.   Psychiatric/Behavioral: The patient is not nervous/anxious.         Physical Exam  Temp:  [36.3 °C (97.3 °F)-36.8 °C (98.2 °F)] 36.3 °C (97.3 °F)  Pulse:  [68-83] 83  Resp:  [16-18] 18  BP: (101-152)/(66-82) 152/82  SpO2:  [94 %-98 %] 94 %    Physical Exam  Vitals and nursing note reviewed.   Constitutional:       Appearance: Normal appearance.   HENT:      Head: Atraumatic.   Eyes:      Conjunctiva/sclera: Conjunctivae normal.      Pupils: Pupils are equal, round, and reactive to light.   Cardiovascular:      Rate and Rhythm: Normal rate and regular rhythm.      Heart sounds: No murmur heard.  Pulmonary:      Effort: Pulmonary effort is normal.      Breath sounds: No stridor. No wheezing or rales.   Abdominal:      General: Bowel sounds are normal.      Palpations: Abdomen is soft.   Musculoskeletal:      Cervical back: Normal range of motion and neck supple.      Right lower leg: No edema.      Left lower leg: No edema.   Skin:     General: Skin is warm and dry.      Findings: No rash.   Neurological:      Mental Status: He is alert and oriented to person, place, and time.   Psychiatric:         Mood and Affect: Mood normal.         Behavior: Behavior normal.         Fluids    Intake/Output Summary (Last 24 hours) at 6/3/2022 0816  Last data filed at 6/2/2022 2100  Gross per 24 hour   Intake 1120 ml   Output --   Net 1120 ml       Laboratory  Recent Labs     06/01/22  0630 06/02/22  0509   WBC 16.5* 14.6*   RBC 4.32* 4.15*   HEMOGLOBIN 12.2* 12.0*    HEMATOCRIT 37.2* 36.2*   MCV 86.1 87.2   MCH 28.2 28.9   MCHC 32.8* 33.1*   RDW 44.5 44.6   PLATELETCT 476* 443   MPV 9.6 9.4     Recent Labs     06/01/22  0630   SODIUM 137   POTASSIUM 3.8   CHLORIDE 106   CO2 20   GLUCOSE 96   BUN 22   CREATININE 1.15   CALCIUM 8.5                   Imaging    Assessment/Plan  Leukocytosis  Assessment & Plan  WBC's have been elevated since 5/28  WBC's: 17.1 (5/28) --> 15.4 --> 14.9 --> 16.1 --> 16.5 (6/1) --> 14.6 (6/2)  Has been afebrile  Denies cough  Has some loose stools  Has hx of recurrent UTI's - see other assessment  C. DIff neg (5/29)  U/A (+) with yeast --> f/u C&S  CXR: unremarkable  Procalcitonin: wnl levels  Will add Imodium prn  Note: on Ancef for recent UTI  Cont to monitor    Vitamin D insufficiency  Assessment & Plan  Vit D: 29  On supplements    Essential hypertension, benign- (present on admission)  Assessment & Plan  BP labile   On Toprol XL  On Cozaar  Note also on Flomax  Cont to monitor    Benign prostatic hyperplasia with urinary obstruction- (present on admission)  Assessment & Plan  On Flomax    GERD with esophagitis- (present on admission)  Assessment & Plan  On Prilosec    Recurrent UTI- (present on admission)  Assessment & Plan  Has hx of recurrent UTI  Was adm to [facility] for UTI, diarrhea, and confusion  UC --> Staph Aureus (and U/A showed yeast)  Repeat U/A (6/1): positive with yeast --> f/u C&S  On Ancef (thru 6/5)  On Diflucan  Note: pt self caths at home    Type 2 diabetes mellitus, with long-term current use of insulin (HCC)- (present on admission)  Assessment & Plan  Hba1c: 6.6 (6/1)  BS   On Glargie: 11 units qhs  Note: home meds include Trulicity 3 mg weekly, Toujeo 5-12 units daily, Humalog 5 units base and per SS  Cont to monitor

## 2022-06-03 NOTE — THERAPY
"Speech Language Pathology  Daily Treatment     Patient Name: Av oBb  Age:  77 y.o., Sex:  male  Medical Record #: [id number]  Today's Date: 6/3/2022     Precautions  Precautions: Fall Risk, Swallow Precautions ( See Comments)  Comments: L eric from previous CVA, tdine    Subjective    Pt willing to participate in ST session, slow processing noted throughout this session      Objective       06/03/22 0931   SLP Total Time Spent   SLP Individual Total Time Spent (Mins) 60   Treatment Charges   SLP Cognitive Skill Development First 15 Minutes 1   SLP Cognitive Skill Development Additional 15 Minutes 3       Assessment    O-log completed, pt scored 21/30 and required cues to recall the current city, month, date, reason for being in the hospital and current deficits.  Pt expressing frustration about being in rehab, when asked why he is in the hospital pt stated \"So I can be tortured\".  Education given about why pt is currently in the hospital and the purpose of rehab.  Pt willing to participate in ST session, but demonstrating a depressed mood throughout this session.  Pt was able to follow single step directions with extra time needed for processing independently to achieve 88% accuracy (pt skipped 2 problems and completed them accurately once they were pointed out).  Pt then completed 2 step written directions independently to achieve 38% accuracy.  Mod cues required for attention (completing both directions, pt completed the first directions and then moved onto the next question on most) to increase pt's accuracy to 100%.  Encouragement required for participation, pt often answered \"I don't know\" before attempting to correct errors.           Plan    O-log, attention, memory       Speech Therapy Problems (Active)       Problem: Memory STGs       Dates: Start: 06/01/22         Goal: STG-Within one week, patient will remember safety precautions related to hospitalization with 75% accuracy.        Dates: " Start: 06/01/22         Goal Note filed on 06/01/22 1448 by Harpal Williamson MS,CCC-SLP       Pt evaluated on 6/1, continue to target                 Problem: Problem Solving STGs       Dates: Start: 06/01/22         Goal: STG-Within one week, patient will complete SCCAN with appropriate goals to follow        Dates: Start: 06/01/22         Goal Note filed on 06/01/22 1448 by Harpal Williamson MS,CCC-SLP       Pt evaluated on 6/1, continue to target              Goal: STG-Within one week, patient will complete o-log with a final score of 25/30 over three consecutive days       Dates: Start: 06/01/22         Goal Note filed on 06/01/22 1448 by Harpal Williamson MS,CCC-SLP       Pt evaluated on 6/1, continue to target                 Problem: Speech/Swallowing LTGs       Dates: Start: 06/01/22         Goal: LTG-By discharge, patient will solve basic problems in order to d/c home with SPV       Dates: Start: 06/01/22

## 2022-06-03 NOTE — CARE PLAN
Problem: Skin Integrity  Goal: Skin integrity is maintained or improved  Outcome: Progressing    Pt is able to reposition himself in bed to reduce the risk for skin breakdown.      Problem: Fall Risk - Rehab  Goal: Patient will remain free from falls  Outcome: Progressing     Pt uses the call light when needing assistance reducing the risk of falls.

## 2022-06-04 LAB
BACTERIA UR CULT: ABNORMAL
BACTERIA UR CULT: ABNORMAL
GLUCOSE BLD STRIP.AUTO-MCNC: 103 MG/DL (ref 65–99)
GLUCOSE BLD STRIP.AUTO-MCNC: 105 MG/DL (ref 65–99)
GLUCOSE BLD STRIP.AUTO-MCNC: 108 MG/DL (ref 65–99)
GLUCOSE BLD STRIP.AUTO-MCNC: 157 MG/DL (ref 65–99)
SIGNIFICANT IND 70042: ABNORMAL
SITE SITE: ABNORMAL
SOURCE SOURCE: ABNORMAL

## 2022-06-04 PROCEDURE — 82962 GLUCOSE BLOOD TEST: CPT | Mod: 91

## 2022-06-04 PROCEDURE — 700105 HCHG RX REV CODE 258: Performed by: PHYSICAL MEDICINE & REHABILITATION

## 2022-06-04 PROCEDURE — A9270 NON-COVERED ITEM OR SERVICE: HCPCS | Performed by: HOSPITALIST

## 2022-06-04 PROCEDURE — 97130 THER IVNTJ EA ADDL 15 MIN: CPT

## 2022-06-04 PROCEDURE — 700101 HCHG RX REV CODE 250: Performed by: PHYSICAL MEDICINE & REHABILITATION

## 2022-06-04 PROCEDURE — A9270 NON-COVERED ITEM OR SERVICE: HCPCS | Performed by: PHYSICAL MEDICINE & REHABILITATION

## 2022-06-04 PROCEDURE — 700102 HCHG RX REV CODE 250 W/ 637 OVERRIDE(OP): Performed by: HOSPITALIST

## 2022-06-04 PROCEDURE — 700111 HCHG RX REV CODE 636 W/ 250 OVERRIDE (IP): Performed by: PHYSICAL MEDICINE & REHABILITATION

## 2022-06-04 PROCEDURE — 97116 GAIT TRAINING THERAPY: CPT

## 2022-06-04 PROCEDURE — 700102 HCHG RX REV CODE 250 W/ 637 OVERRIDE(OP): Performed by: PHYSICAL MEDICINE & REHABILITATION

## 2022-06-04 PROCEDURE — 97112 NEUROMUSCULAR REEDUCATION: CPT

## 2022-06-04 PROCEDURE — 97129 THER IVNTJ 1ST 15 MIN: CPT

## 2022-06-04 PROCEDURE — 97530 THERAPEUTIC ACTIVITIES: CPT

## 2022-06-04 PROCEDURE — 770010 HCHG ROOM/CARE - REHAB SEMI PRIVAT*

## 2022-06-04 PROCEDURE — 99232 SBSQ HOSP IP/OBS MODERATE 35: CPT | Performed by: HOSPITALIST

## 2022-06-04 RX ORDER — AMLODIPINE BESYLATE 5 MG/1
5 TABLET ORAL
Status: DISCONTINUED | OUTPATIENT
Start: 2022-06-04 | End: 2022-06-07

## 2022-06-04 RX ADMIN — METOPROLOL SUCCINATE 100 MG: 100 TABLET, FILM COATED, EXTENDED RELEASE ORAL at 22:01

## 2022-06-04 RX ADMIN — MICONAZOLE NITRATE: 20 CREAM TOPICAL at 09:06

## 2022-06-04 RX ADMIN — MICONAZOLE NITRATE: 20 CREAM TOPICAL at 22:04

## 2022-06-04 RX ADMIN — Medication 1000 UNITS: at 09:02

## 2022-06-04 RX ADMIN — OMEPRAZOLE 20 MG: 20 CAPSULE, DELAYED RELEASE ORAL at 09:01

## 2022-06-04 RX ADMIN — CEFAZOLIN 2 G: 2 INJECTION, POWDER, FOR SOLUTION INTRAMUSCULAR; INTRAVENOUS at 22:20

## 2022-06-04 RX ADMIN — AMLODIPINE BESYLATE 5 MG: 5 TABLET ORAL at 10:02

## 2022-06-04 RX ADMIN — ALLOPURINOL 100 MG: 100 TABLET ORAL at 06:01

## 2022-06-04 RX ADMIN — FLUOXETINE 40 MG: 20 CAPSULE ORAL at 09:02

## 2022-06-04 RX ADMIN — HYDRALAZINE HYDROCHLORIDE 25 MG: 25 TABLET, FILM COATED ORAL at 06:52

## 2022-06-04 RX ADMIN — ATORVASTATIN CALCIUM 80 MG: 40 TABLET, FILM COATED ORAL at 22:01

## 2022-06-04 RX ADMIN — CLOPIDOGREL 75 MG: 75 TABLET, FILM COATED ORAL at 06:01

## 2022-06-04 RX ADMIN — SENNOSIDES AND DOCUSATE SODIUM 2 TABLET: 50; 8.6 TABLET ORAL at 09:01

## 2022-06-04 RX ADMIN — INSULIN GLARGINE-YFGN 11 UNITS: 100 INJECTION, SOLUTION SUBCUTANEOUS at 22:10

## 2022-06-04 RX ADMIN — LOSARTAN POTASSIUM 50 MG: 25 TABLET, FILM COATED ORAL at 22:01

## 2022-06-04 RX ADMIN — CEFAZOLIN 2 G: 2 INJECTION, POWDER, FOR SOLUTION INTRAMUSCULAR; INTRAVENOUS at 14:29

## 2022-06-04 RX ADMIN — LOSARTAN POTASSIUM 50 MG: 25 TABLET, FILM COATED ORAL at 09:02

## 2022-06-04 RX ADMIN — LIDOCAINE 1 PATCH: 50 PATCH TOPICAL at 14:30

## 2022-06-04 RX ADMIN — INSULIN HUMAN 1 UNITS: 100 INJECTION, SOLUTION PARENTERAL at 22:10

## 2022-06-04 RX ADMIN — FLUCONAZOLE 100 MG: 100 TABLET ORAL at 09:01

## 2022-06-04 RX ADMIN — ACETAMINOPHEN 650 MG: 325 TABLET ORAL at 09:08

## 2022-06-04 RX ADMIN — TAMSULOSIN HYDROCHLORIDE 0.4 MG: 0.4 CAPSULE ORAL at 09:02

## 2022-06-04 RX ADMIN — CEFAZOLIN 2 G: 2 INJECTION, POWDER, FOR SOLUTION INTRAMUSCULAR; INTRAVENOUS at 06:01

## 2022-06-04 RX ADMIN — RIVAROXABAN 10 MG: 10 TABLET, FILM COATED ORAL at 18:20

## 2022-06-04 ASSESSMENT — ENCOUNTER SYMPTOMS
PALPITATIONS: 0
BLURRED VISION: 0
VOMITING: 0
SHORTNESS OF BREATH: 0
DIZZINESS: 0
NAUSEA: 0
HEADACHES: 0
FEVER: 0
HALLUCINATIONS: 0

## 2022-06-04 ASSESSMENT — GAIT ASSESSMENTS
DISTANCE (FEET): 5
GAIT LEVEL OF ASSIST: TOTAL ASSIST X 2

## 2022-06-04 NOTE — THERAPY
"Speech Language Pathology  Daily Treatment     Patient Name: Av Bob  Age:  77 y.o., Sex:  male  Medical Record #: [id number]  Today's Date: 6/4/2022     Precautions  Precautions: (P) Fall Risk, Swallow Precautions ( See Comments)  Comments: (P) L eric from previous CVA, tdine    Subjective    Pt seen at bedside (given CNA report that he had been up for awhile and just returned to bed). His wife was present throughout and assisted in encouraging pt participation     Objective       06/04/22 1302   Precautions   Precautions Fall Risk;Swallow Precautions ( See Comments)   Comments L eric from previous CVA, tdine   Interdisciplinary Plan of Care Collaboration   IDT Collaboration with  Family / Caregiver   Patient Position at End of Therapy In Bed;Family / Friend in Room   Collaboration Comments Wife present throughout and supportive   Speech Language Pathologist Assigned   Assigned SLP / Extension CW 60 SPLIT OK   SLP Total Time Spent   SLP Individual Total Time Spent (Mins) 60   Treatment Charges   SLP Cognitive Skill Development First 15 Minutes 1   SLP Cognitive Skill Development Additional 15 Minutes 3       Assessment    O-Log= 16/30  Pt completed 2 component written following directions, 4 component written following directions, and written sequencing task with mod cues and increased time to complete. He required assistance keeping track of target on the page and persisting with same target until completion. Pt's wife reports some degree of change from his baseline, however states he was reliant on her at PLOF d/t previous stroke. She reports he has participated in speech therapy in the past.     Of note, wife reports he enjoys playing hearts on his iPad. She plans to bring this in for him. SLP provided same activities from session as \"homework\" to complete with his wife         Plan    Continue plan as outlined; follow up on \"hearts\" game on his iPad    Passport items to be completed:  Express basic " needs, understand food/liquid recommendations, consistently follow swallow precautions, manage finances, manage medications, arrive to therapy appointments on time, complete daily memory log entries, solve problems related to safety situations, review education related to hospitalization, complete caregiver training     Speech Therapy Problems (Active)       Problem: Memory STGs       Dates: Start: 06/01/22         Goal: STG-Within one week, patient will remember safety precautions related to hospitalization with 75% accuracy.        Dates: Start: 06/01/22         Goal Note filed on 06/01/22 1448 by Harpal Williamson MS,CCC-SLP       Pt evaluated on 6/1, continue to target                 Problem: Problem Solving STGs       Dates: Start: 06/01/22         Goal: STG-Within one week, patient will complete SCCAN with appropriate goals to follow        Dates: Start: 06/01/22         Goal Note filed on 06/01/22 1448 by Harpal Williamson MS,CCC-SLP       Pt evaluated on 6/1, continue to target              Goal: STG-Within one week, patient will complete o-log with a final score of 25/30 over three consecutive days       Dates: Start: 06/01/22         Goal Note filed on 06/01/22 1448 by Harpal Williamson MS,CCC-SLP       Pt evaluated on 6/1, continue to target                 Problem: Speech/Swallowing LTGs       Dates: Start: 06/01/22         Goal: LTG-By discharge, patient will solve basic problems in order to d/c home with SPV       Dates: Start: 06/01/22

## 2022-06-04 NOTE — CARE PLAN
"The patient is Stable - Low risk of patient condition declining or worsening    Shift Goals  Clinical Goals: Safety  Patient Goals: Sleep well      Problem: Skin Integrity  Goal: Skin integrity is maintained or improved  Outcome: Progressing  Note:   Andrea Score: 16    Patient's skin remains intact and free from new or accidental injury this shift; no s/s of infection. RN wound protocol checked. Encouraged hydration and educated about the importance of nutrition to keep skin integrity. Will continue to monitor.       Problem: Fall Risk - Rehab  Goal: Patient will remain free from falls  Outcome: Progressing  Note: Camilla Gruber Fall risk Assessment Score: 17    High fall risk Interventions   - Bed and strip alarm   - Yellow sign by the door   - Yellow wrist band \"Fall risk\"  - Room near to the nurse station  - Do not leave patient unattended in the bathroom  - Fall risk education provided     "

## 2022-06-04 NOTE — THERAPY
Physical Therapy   Daily Treatment     Patient Name: Av Bob  Age:  77 y.o., Sex:  male  Medical Record #: [id number]  Today's Date: 6/4/2022     Precautions  Precautions: (P) Fall Risk, Swallow Precautions ( See Comments)  Comments: L eric from previous CVA, tdine    Subjective    Patient agreeable to participate, reports increased R leg pain (nursing provided tylenol prior to therapy session). Patient's wife present and supportive throughout treatment.      Objective       06/04/22 1031   Precautions   Precautions Fall Risk;Swallow Precautions ( See Comments)   Gait Functional Level of Assist    Gait Level Of Assist Total Assist X 2  (max A from therapist; close WC follow by second person)   Assistive Device   (R wall rail, L AFO)   Distance (Feet) 5   # of Times Distance was Traveled 1   Deviation Ataxic;Step To;Decreased Base Of Support;Decreased Heel Strike;Decreased Toe Off;Bradykinetic  (posterior lean)   Transfer Functional Level of Assist   Bed, Chair, Wheelchair Transfer Total Assist X 2  (Max A x 1; second person for safety)   Bed Chair Wheelchair Transfer Description Initial preparation for task;Increased time;Squat pivot transfer to wheelchair;Verbal cueing;Set-up of equipment   Bed Mobility    Supine to Sit Maximal Assist   Sit to Stand Maximal Assist   Rolling Moderate Assist to Lt.;Moderate Assist to Rt. (Dependent to change brief and perform perihygiene)   Neuro-Muscular Treatments   Comments   Sitting balance at edge of mat x10 minutes total:  -practice lateral and forward scooting to improve squat pivot transfers (cuing at hips for forward lean; cuing for head-bottom principle; mod A)  -repeated forward lean/anterior weight-shifting (patient reaching forward to grab weighted bar held by therapist)     Interdisciplinary Plan of Care Collaboration   IDT Collaboration with  Therapy Tech;Family / Caregiver   Patient Position at End of Therapy Seated;Chair Alarm On;Self Releasing Lap Belt  Applied;Call Light within Reach;Tray Table within Reach;Family / Friend in Room   Collaboration Comments Collaborated with tech re: transfers       Assessment    Patient with increased fatigue and required cuing throughout session to stay on task, but was pleasant and willing to participate in therapy. Continues to demonstrate significant posterior lean in unsupported sitting, sit <> stand transition and standing/gait, requiring max A to correct at times.     Strengths: Able to follow instructions, Motivated for self care and independence, Pleasant and cooperative, Supportive family, Willingly participates in therapeutic activities  Barriers: Bladder incontinence, Decreased endurance, Hemiparesis, Impaired activity tolerance, Impaired balance, Limited mobility    Plan    Bed mobility training, transfer training (squat pivot currently), standing tolerance/balance, Function in Sitting Test, gait training (currently using R wall rail), ROM/stretching, neuro re-ed for L hemibody.     Passport items to be completed:  Get in/out of bed safely, in/out of a vehicle, safely use mobility device, walk or wheel around home/community, navigate up and down stairs, show how to get up/down from the ground, ensure home is accessible, demonstrate HEP, complete caregiver training       Physical Therapy Problems (Active)       Problem: Balance       Dates: Start: 06/01/22         Goal: STG-Within one week, patient will tolerate Function in Sitting Test assessment.       Dates: Start: 06/01/22               Problem: Mobility       Dates: Start: 06/01/22         Goal: STG-Within one week, patient will propel wheelchair household distances 50 ft with Min A.       Dates: Start: 06/01/22               Problem: Mobility Transfers       Dates: Start: 06/01/22         Goal: STG-Within one week, patient will perform bed mobility with Mod A and bed functions as needed.       Dates: Start: 06/01/22            Goal: STG-Within one week, patient  will transfer bed to chair with Max Ax1 via squat/stand pivot.       Dates: Start: 06/01/22               Problem: PT-Long Term Goals       Dates: Start: 06/01/22         Goal: LTG-By discharge, patient will propel wheelchair 50 ft mod I.       Dates: Start: 06/01/22            Goal: LTG-By discharge, patient will ambulate 50 ft with LRAD and Min A.       Dates: Start: 06/01/22            Goal: LTG-By discharge, patient will transfer one surface to another with CGA and LRAD.       Dates: Start: 06/01/22            Goal: LTG-By discharge, patient will transfer in/out of a car with CGA and LRAD.       Dates: Start: 06/01/22            Goal: LTG-By discharge, patient will perform bed mobility independently.       Dates: Start: 06/01/22

## 2022-06-04 NOTE — PROGRESS NOTES
[facility] Medicine Daily Progress Note    Date of Service  6/4/2022    Chief Complaint:  Hypertension  Diabetes  Leukocytosis    Interval History:  No complaints.  Doing ok.    Review of Systems  Review of Systems   Constitutional: Negative for fever.   Eyes: Negative for blurred vision.   Respiratory: Negative for shortness of breath.    Cardiovascular: Negative for palpitations.   Gastrointestinal: Negative for nausea and vomiting.   Neurological: Negative for dizziness and headaches.   Psychiatric/Behavioral: Negative for hallucinations.        Physical Exam  Temp:  [36.2 °C (97.2 °F)-37.2 °C (99 °F)] 37.2 °C (98.9 °F)  Pulse:  [72-86] 72  Resp:  [16-18] 16  BP: (130-180)/() 170/65  SpO2:  [93 %-96 %] 94 %    Physical Exam  Vitals and nursing note reviewed.   Constitutional:       General: He is not in acute distress.  HENT:      Mouth/Throat:      Mouth: Mucous membranes are moist.      Pharynx: Oropharynx is clear.   Eyes:      General: No scleral icterus.  Cardiovascular:      Rate and Rhythm: Normal rate and regular rhythm.   Pulmonary:      Effort: Pulmonary effort is normal.      Breath sounds: No wheezing or rales.   Abdominal:      General: There is no distension.      Palpations: Abdomen is soft.      Tenderness: There is no abdominal tenderness.   Musculoskeletal:      Cervical back: No rigidity.      Right lower leg: No edema.      Left lower leg: No edema.   Skin:     General: Skin is warm and dry.   Neurological:      Mental Status: He is alert and oriented to person, place, and time.   Psychiatric:         Mood and Affect: Mood normal.         Behavior: Behavior normal.         Fluids    Intake/Output Summary (Last 24 hours) at 6/4/2022 0925  Last data filed at 6/3/2022 2100  Gross per 24 hour   Intake 580 ml   Output --   Net 580 ml       Laboratory  Recent Labs     06/02/22  0509   WBC 14.6*   RBC 4.15*   HEMOGLOBIN 12.0*   HEMATOCRIT 36.2*   MCV 87.2   MCH 28.9   MCHC 33.1*   RDW 44.6    PLATELETCT 443   MPV 9.4                       Imaging    Assessment/Plan  Leukocytosis  Assessment & Plan  WBC's have been elevated since 5/28  WBC's: 17.1 (5/28) --> 15.4 --> 14.9 --> 16.1 --> 16.5 (6/1) --> 14.6 (6/2)  Has been afebrile  Denies cough  Has some loose stools  U/A (+) --> has hx of recurrent UTI's - see other assessment  C. Diff neg (5/29)  CXR: unremarkable  Procalcitonin: wnl levels  On Imodium prn  Note: on Ancef for recent UTI  Cont to monitor    Vitamin D insufficiency  Assessment & Plan  Vit D: 29  On supplements    Essential hypertension, benign- (present on admission)  Assessment & Plan  BP labile and elevated  On Toprol XL  On Cozaar  Will start Norvasc  Note also on Flomax  Cont to monitor    Benign prostatic hyperplasia with urinary obstruction- (present on admission)  Assessment & Plan  On Flomax    GERD with esophagitis- (present on admission)  Assessment & Plan  On Prilosec    Recurrent UTI- (present on admission)  Assessment & Plan  Has hx of recurrent UTI  [place] [facility] [place], [place], and confusion  UC --> Staph Aureus (and U/A showed yeast)  Repeat UC --> show Candida --> follow cx another day  On Ancef (thru 6/5)  On Diflucan  Note: pt self caths at home    Type 2 diabetes mellitus, with long-term current use of insulin (HCC)- (present on admission)  Assessment & Plan  Hba1c: 6.6 (6/1)  -201  On Glargie: 11 units qhs  Note: home meds include Trulicity 3 mg weekly, Toujeo 5-12 units daily, Humalog 5 units base and per SS  Cont to monitor

## 2022-06-04 NOTE — CARE PLAN
Problem: Fall Risk - Rehab  Goal: Patient will remain free from falls  Outcome: Progressing     Pt uses the call light when needing assistance reducing the risk of falls.     Problem: Nutrition  Goal: Patient's nutritional and fluid intake will be adequate or improve  Outcome: Progressing    Pt is eating nearly 100% of his meals and does drink most of what is given to him.

## 2022-06-05 PROBLEM — E87.6 HYPOKALEMIA: Status: ACTIVE | Noted: 2022-06-05

## 2022-06-05 PROBLEM — E83.39 HYPOPHOSPHATEMIA: Status: ACTIVE | Noted: 2022-06-05

## 2022-06-05 LAB
ANION GAP SERPL CALC-SCNC: 10 MMOL/L (ref 7–16)
BASOPHILS # BLD AUTO: 0.5 % (ref 0–1.8)
BASOPHILS # BLD: 0.06 K/UL (ref 0–0.12)
BUN SERPL-MCNC: 23 MG/DL (ref 8–22)
CALCIUM SERPL-MCNC: 8.4 MG/DL (ref 8.5–10.5)
CHLORIDE SERPL-SCNC: 104 MMOL/L (ref 96–112)
CO2 SERPL-SCNC: 22 MMOL/L (ref 20–33)
CREAT SERPL-MCNC: 1.06 MG/DL (ref 0.5–1.4)
EOSINOPHIL # BLD AUTO: 0.3 K/UL (ref 0–0.51)
EOSINOPHIL NFR BLD: 2.4 % (ref 0–6.9)
ERYTHROCYTE [DISTWIDTH] IN BLOOD BY AUTOMATED COUNT: 44.1 FL (ref 35.9–50)
GFR SERPLBLD CREATININE-BSD FMLA CKD-EPI: 73 ML/MIN/1.73 M 2
GLUCOSE BLD STRIP.AUTO-MCNC: 118 MG/DL (ref 65–99)
GLUCOSE BLD STRIP.AUTO-MCNC: 156 MG/DL (ref 65–99)
GLUCOSE BLD STRIP.AUTO-MCNC: 166 MG/DL (ref 65–99)
GLUCOSE BLD STRIP.AUTO-MCNC: 74 MG/DL (ref 65–99)
GLUCOSE SERPL-MCNC: 79 MG/DL (ref 65–99)
HCT VFR BLD AUTO: 33.7 % (ref 42–52)
HGB BLD-MCNC: 11.3 G/DL (ref 14–18)
IMM GRANULOCYTES # BLD AUTO: 0.16 K/UL (ref 0–0.11)
IMM GRANULOCYTES NFR BLD AUTO: 1.3 % (ref 0–0.9)
LYMPHOCYTES # BLD AUTO: 1.32 K/UL (ref 1–4.8)
LYMPHOCYTES NFR BLD: 10.4 % (ref 22–41)
MAGNESIUM SERPL-MCNC: 1.1 MG/DL (ref 1.5–2.5)
MCH RBC QN AUTO: 28.7 PG (ref 27–33)
MCHC RBC AUTO-ENTMCNC: 33.5 G/DL (ref 33.7–35.3)
MCV RBC AUTO: 85.5 FL (ref 81.4–97.8)
MONOCYTES # BLD AUTO: 0.78 K/UL (ref 0–0.85)
MONOCYTES NFR BLD AUTO: 6.2 % (ref 0–13.4)
NEUTROPHILS # BLD AUTO: 10.06 K/UL (ref 1.82–7.42)
NEUTROPHILS NFR BLD: 79.2 % (ref 44–72)
NRBC # BLD AUTO: 0 K/UL
NRBC BLD-RTO: 0 /100 WBC
PHOSPHATE SERPL-MCNC: 2.4 MG/DL (ref 2.5–4.5)
PLATELET # BLD AUTO: 441 K/UL (ref 164–446)
PMV BLD AUTO: 9.4 FL (ref 9–12.9)
POTASSIUM SERPL-SCNC: 3.4 MMOL/L (ref 3.6–5.5)
RBC # BLD AUTO: 3.94 M/UL (ref 4.7–6.1)
SODIUM SERPL-SCNC: 136 MMOL/L (ref 135–145)
WBC # BLD AUTO: 12.7 K/UL (ref 4.8–10.8)

## 2022-06-05 PROCEDURE — 97112 NEUROMUSCULAR REEDUCATION: CPT

## 2022-06-05 PROCEDURE — 36415 COLL VENOUS BLD VENIPUNCTURE: CPT

## 2022-06-05 PROCEDURE — 84100 ASSAY OF PHOSPHORUS: CPT

## 2022-06-05 PROCEDURE — A9270 NON-COVERED ITEM OR SERVICE: HCPCS | Performed by: HOSPITALIST

## 2022-06-05 PROCEDURE — 97535 SELF CARE MNGMENT TRAINING: CPT

## 2022-06-05 PROCEDURE — A9270 NON-COVERED ITEM OR SERVICE: HCPCS | Performed by: PHYSICAL MEDICINE & REHABILITATION

## 2022-06-05 PROCEDURE — 700102 HCHG RX REV CODE 250 W/ 637 OVERRIDE(OP): Performed by: HOSPITALIST

## 2022-06-05 PROCEDURE — 83735 ASSAY OF MAGNESIUM: CPT

## 2022-06-05 PROCEDURE — 82962 GLUCOSE BLOOD TEST: CPT

## 2022-06-05 PROCEDURE — 97129 THER IVNTJ 1ST 15 MIN: CPT

## 2022-06-05 PROCEDURE — 99232 SBSQ HOSP IP/OBS MODERATE 35: CPT | Performed by: HOSPITALIST

## 2022-06-05 PROCEDURE — 700105 HCHG RX REV CODE 258: Performed by: PHYSICAL MEDICINE & REHABILITATION

## 2022-06-05 PROCEDURE — 80048 BASIC METABOLIC PNL TOTAL CA: CPT

## 2022-06-05 PROCEDURE — 97130 THER IVNTJ EA ADDL 15 MIN: CPT

## 2022-06-05 PROCEDURE — 700111 HCHG RX REV CODE 636 W/ 250 OVERRIDE (IP): Performed by: PHYSICAL MEDICINE & REHABILITATION

## 2022-06-05 PROCEDURE — 85025 COMPLETE CBC W/AUTO DIFF WBC: CPT

## 2022-06-05 PROCEDURE — 770010 HCHG ROOM/CARE - REHAB SEMI PRIVAT*

## 2022-06-05 PROCEDURE — 700102 HCHG RX REV CODE 250 W/ 637 OVERRIDE(OP): Performed by: PHYSICAL MEDICINE & REHABILITATION

## 2022-06-05 RX ORDER — LANOLIN ALCOHOL/MO/W.PET/CERES
400 CREAM (GRAM) TOPICAL 2 TIMES DAILY
Status: DISCONTINUED | OUTPATIENT
Start: 2022-06-05 | End: 2022-06-08

## 2022-06-05 RX ORDER — POTASSIUM CHLORIDE 20 MEQ/1
20 TABLET, EXTENDED RELEASE ORAL DAILY
Status: DISCONTINUED | OUTPATIENT
Start: 2022-06-06 | End: 2022-06-07

## 2022-06-05 RX ORDER — POTASSIUM CHLORIDE 20 MEQ/1
40 TABLET, EXTENDED RELEASE ORAL ONCE
Status: COMPLETED | OUTPATIENT
Start: 2022-06-05 | End: 2022-06-05

## 2022-06-05 RX ADMIN — CEFAZOLIN 2 G: 2 INJECTION, POWDER, FOR SOLUTION INTRAMUSCULAR; INTRAVENOUS at 14:25

## 2022-06-05 RX ADMIN — DIBASIC SODIUM PHOSPHATE, MONOBASIC POTASSIUM PHOSPHATE AND MONOBASIC SODIUM PHOSPHATE 250 MG: 852; 155; 130 TABLET ORAL at 22:00

## 2022-06-05 RX ADMIN — OXYCODONE HYDROCHLORIDE 10 MG: 10 TABLET ORAL at 10:28

## 2022-06-05 RX ADMIN — RIVAROXABAN 10 MG: 10 TABLET, FILM COATED ORAL at 17:42

## 2022-06-05 RX ADMIN — ACETAMINOPHEN 650 MG: 325 TABLET ORAL at 08:34

## 2022-06-05 RX ADMIN — CLOPIDOGREL 75 MG: 75 TABLET, FILM COATED ORAL at 05:52

## 2022-06-05 RX ADMIN — POTASSIUM CHLORIDE 40 MEQ: 1500 TABLET, EXTENDED RELEASE ORAL at 10:29

## 2022-06-05 RX ADMIN — ALLOPURINOL 100 MG: 100 TABLET ORAL at 05:52

## 2022-06-05 RX ADMIN — INSULIN HUMAN 1 UNITS: 100 INJECTION, SOLUTION PARENTERAL at 11:35

## 2022-06-05 RX ADMIN — OMEPRAZOLE 20 MG: 20 CAPSULE, DELAYED RELEASE ORAL at 08:30

## 2022-06-05 RX ADMIN — LOPERAMIDE HYDROCHLORIDE 2 MG: 2 CAPSULE ORAL at 08:30

## 2022-06-05 RX ADMIN — INSULIN HUMAN 1 UNITS: 100 INJECTION, SOLUTION PARENTERAL at 22:05

## 2022-06-05 RX ADMIN — MICONAZOLE NITRATE: 20 CREAM TOPICAL at 08:34

## 2022-06-05 RX ADMIN — AMLODIPINE BESYLATE 5 MG: 5 TABLET ORAL at 05:52

## 2022-06-05 RX ADMIN — TAMSULOSIN HYDROCHLORIDE 0.4 MG: 0.4 CAPSULE ORAL at 08:30

## 2022-06-05 RX ADMIN — FLUOXETINE 40 MG: 20 CAPSULE ORAL at 08:30

## 2022-06-05 RX ADMIN — Medication 1000 UNITS: at 08:30

## 2022-06-05 RX ADMIN — MICONAZOLE NITRATE: 20 CREAM TOPICAL at 21:59

## 2022-06-05 RX ADMIN — CEFAZOLIN 2 G: 2 INJECTION, POWDER, FOR SOLUTION INTRAMUSCULAR; INTRAVENOUS at 05:59

## 2022-06-05 RX ADMIN — MAGNESIUM OXIDE TAB 400 MG (241.3 MG ELEMENTAL MG) 400 MG: 400 (241.3 MG) TAB at 10:29

## 2022-06-05 RX ADMIN — LOSARTAN POTASSIUM 50 MG: 25 TABLET, FILM COATED ORAL at 08:30

## 2022-06-05 RX ADMIN — DIBASIC SODIUM PHOSPHATE, MONOBASIC POTASSIUM PHOSPHATE AND MONOBASIC SODIUM PHOSPHATE 250 MG: 852; 155; 130 TABLET ORAL at 10:28

## 2022-06-05 RX ADMIN — LOSARTAN POTASSIUM 50 MG: 25 TABLET, FILM COATED ORAL at 21:59

## 2022-06-05 RX ADMIN — MAGNESIUM OXIDE TAB 400 MG (241.3 MG ELEMENTAL MG) 400 MG: 400 (241.3 MG) TAB at 22:00

## 2022-06-05 RX ADMIN — INSULIN GLARGINE-YFGN 11 UNITS: 100 INJECTION, SOLUTION SUBCUTANEOUS at 22:04

## 2022-06-05 RX ADMIN — ATORVASTATIN CALCIUM 80 MG: 40 TABLET, FILM COATED ORAL at 21:59

## 2022-06-05 RX ADMIN — METOPROLOL SUCCINATE 100 MG: 100 TABLET, FILM COATED, EXTENDED RELEASE ORAL at 21:59

## 2022-06-05 RX ADMIN — FLUCONAZOLE 100 MG: 100 TABLET ORAL at 08:34

## 2022-06-05 RX ADMIN — OXYCODONE HYDROCHLORIDE 10 MG: 10 TABLET ORAL at 22:11

## 2022-06-05 ASSESSMENT — ACTIVITIES OF DAILY LIVING (ADL)
TOILETING_LEVEL_OF_ASSIST_DESCRIPTION: ASSIST FOR HYGIENE;ASSIST TO PULL PANTS UP;ASSIST TO PULL PANTS DOWN;ASSIST FOR STANDING BALANCE;GRAB BAR;INCREASED TIME;SUPERVISION FOR SAFETY;VERBAL CUEING

## 2022-06-05 ASSESSMENT — ENCOUNTER SYMPTOMS
DIARRHEA: 0
DIZZINESS: 0
FEVER: 0
NERVOUS/ANXIOUS: 0
COUGH: 0
BLURRED VISION: 0

## 2022-06-05 ASSESSMENT — PAIN DESCRIPTION - PAIN TYPE
TYPE: ACUTE PAIN

## 2022-06-05 NOTE — THERAPY
Occupational Therapy  Daily Treatment     Patient Name: Av Bob  Age:  77 y.o., Sex:  male  Medical Record #: [id number]  Today's Date: 6/5/2022     Precautions  Precautions: Fall Risk, Swallow Precautions ( See Comments)  Comments: L eric from previous CVA, tdine         Subjective    Pt received supine in bed, agreeable to OT session, found to be incontinent of BM upon arrival however pt unaware and stated that his diaper was clean. Pt incontinent of BM again later in session and again unaware.      Objective       06/05/22 0701   Functional Level of Assist   Eating Supervision   Eating Description Set-up of equipment or meal/tube feeding  (setup for bilateral tasks, able to feed self using RUE once setup)   Lower Body Dressing Total Assist x 2   Lower Body Dressing Description Other (comment)  (completed initially at bed level, completed again later in session from w/c level, total A x2 for both strategies)   Toileting Total Assist x 2   Toileting Description Assist for hygiene;Assist to pull pants up;Assist to pull pants down;Assist for standing balance;Grab bar;Increased time;Supervision for safety;Verbal cueing  (x2 during session, pt found to be incontinent of BM upon arrival, changed in bed. Pt again incontinent later in session up in chair, able to stand with RUE on bedrail for changing and hygiene. x2 person assist for both strategies)   Bed, Chair, Wheelchair Transfer Total Assist X 2   Bed Chair Wheelchair Transfer Description Other (comment);Increased time;Squat pivot transfer to wheelchair;Supervision for safety  (max A x1, min A x1 to R bed > w/c)   Balance   Comments sit to stands in // bars x4 with standing trials ~30 seconds, max A for sit to stand, mod A to maintain upright posture, can improve with verbal/tactile cues but does not sustain. pt also completed sit to stands x3 in room using end of bed bedrail with RUE to change and manage hygiene after BM. Pt also completed trunk  activation at w/c level moving from leaning against backrest to anterior lean x7 repetitions using core instead of RUE support, improved with repetition, tendency to revert back to using RUE as primary support   Bed Mobility    Supine to Sit Maximal Assist  (to L)   Interdisciplinary Plan of Care Collaboration   IDT Collaboration with  Nursing;Therapy Tech   Patient Position at End of Therapy Seated;Call Light within Reach;Tray Table within Reach  (setup for breakfast, call light within reach of R hand)   Collaboration Comments therapy tech assist for all mobility and ADLs. discussed bowel incontinence and transfer status with RN   OT Total Time Spent   OT Individual Total Time Spent (Mins) 75   OT Charge Group   OT Self Care / ADL 3   OT Neuromuscular Re-education / Balance 2     Discussed PLOF with patient, he reports his wife helps him dress seated edge of bed, has floor to ceiling transfer pole that he uses to pull himself to stand while she pulls pants up, he also uses this for transfers    Assessment    Pt tolerated session fair, limited by bowel incontinence x2 during session requiring total assist for management of hygiene and to change brief/pants. Cumulatively tolerated sit to stands x7 during session, short tolerance for standing and difficulty sustaining upright posture though this did improve during toileting management as length of stand was more time sensitive to enable clean up and pants management. Pt pleasant and cooperative however does require encouragement to push himself and repetition with strategies for implementation.     Strengths: Motivated for self care and independence, Pleasant and cooperative, Supportive family, Willingly participates in therapeutic activities  Barriers: Confused, Decreased endurance, Fatigue, Generalized weakness, Impaired activity tolerance, Impaired balance, Impaired functional cognition, Limited mobility    Plan    ADLs, standing tolerance, endurance,  strengthening, safety/consistency with transfers    Occupational Therapy Goals (Active)       Problem: Bathing       Dates: Start: 06/01/22         Goal: STG-Within one week, patient will bathe w/ max A using DME as needed.        Dates: Start: 06/01/22               Problem: Dressing       Dates: Start: 06/01/22         Goal: STG-Within one week, patient will dress UB w/ mod A.       Dates: Start: 06/01/22            Goal: STG-Within one week, patient will dress LB w/ mod A.        Dates: Start: 06/01/22               Problem: Functional Transfers       Dates: Start: 06/01/22         Goal: STG-Within one week, patient will transfer to toilet w/ max A using DME as needed.       Dates: Start: 06/01/22            Goal: STG-Within one week, patient will transfer to step in shower with max A using DME as needed.       Dates: Start: 06/01/22               Problem: OT Long Term Goals       Dates: Start: 06/01/22         Goal: LTG-By discharge, patient will complete basic self care tasks with min A using DME and AE as needed.       Dates: Start: 06/01/22            Goal: LTG-By discharge, patient will perform bathroom transfers w/ min A using DME and AE as needed.       Dates: Start: 06/01/22               Problem: Toileting       Dates: Start: 06/01/22         Goal: STG-Within one week, patient will complete toileting tasks w/ max A using DME as needed.        Dates: Start: 06/01/22

## 2022-06-05 NOTE — CARE PLAN
The patient is Stable - Low risk of patient condition declining or worsening    Shift Goals  Clinical Goals: Safety  Patient Goals: Sleep well    Problem: Skin Integrity  Goal: Patient's skin integrity will be maintained or improve  Outcome: Progressing     Problem: Diabetes Management  Goal: Patient's ability to maintain appropriate glucose levels will be maintained or improve  Outcome: Progressing. Pt's FSBS at 2100 was 157.      Problem: Pain - Standard  Goal: Alleviation of pain or a reduction in pain to the patient’s comfort goal  Outcome: Progressing. Patient able to verbalize pain level and verbalizes an acceptable level of pain.

## 2022-06-05 NOTE — PROGRESS NOTES
[facility] Medicine Daily Progress Note    Date of Service  6/5/2022    Chief Complaint:  Hypertension  Diabetes  Leukocytosis    Interval History:  Discussed about his electrolytes are on the lower side and will start supplements.  Wife states he take K+ and Mg supplements at home.    Review of Systems  Review of Systems   Constitutional: Negative for fever.   Eyes: Negative for blurred vision.   Respiratory: Negative for cough.    Cardiovascular: Negative for chest pain.   Gastrointestinal: Negative for diarrhea.   Musculoskeletal: Negative for joint pain.   Neurological: Negative for dizziness.   Psychiatric/Behavioral: The patient is not nervous/anxious.         Physical Exam  Temp:  [36.7 °C (98 °F)-37.2 °C (98.9 °F)] 37.2 °C (98.9 °F)  Pulse:  [58-66] 62  Resp:  [18-20] 20  BP: (125-197)/(67-81) 125/71  SpO2:  [94 %-97 %] 97 %    Physical Exam  Vitals and nursing note reviewed.   Constitutional:       Appearance: He is not diaphoretic.   HENT:      Mouth/Throat:      Pharynx: No oropharyngeal exudate or posterior oropharyngeal erythema.   Eyes:      Extraocular Movements: Extraocular movements intact.   Neck:      Vascular: No carotid bruit.   Cardiovascular:      Rate and Rhythm: Normal rate and regular rhythm.   Pulmonary:      Effort: Pulmonary effort is normal.      Breath sounds: No wheezing or rales.   Abdominal:      General: There is no distension.      Palpations: Abdomen is soft.      Tenderness: There is no abdominal tenderness.   Musculoskeletal:      Right lower leg: No edema.      Left lower leg: No edema.   Skin:     General: Skin is warm and dry.   Neurological:      Mental Status: He is alert and oriented to person, place, and time.   Psychiatric:         Mood and Affect: Mood normal.         Behavior: Behavior normal.         Fluids    Intake/Output Summary (Last 24 hours) at 6/5/2022 0942  Last data filed at 6/4/2022 1237  Gross per 24 hour   Intake 180 ml   Output --   Net 180 ml        Laboratory  Recent Labs     22  0533   WBC 12.7*   RBC 3.94*   HEMOGLOBIN 11.3*   HEMATOCRIT 33.7*   MCV 85.5   MCH 28.7   MCHC 33.5*   RDW 44.1   PLATELETCT 441   MPV 9.4     Recent Labs     22  0533   SODIUM 136   POTASSIUM 3.4*   CHLORIDE 104   CO2 22   GLUCOSE 79   BUN 23*   CREATININE 1.06   CALCIUM 8.4*                   Imaging    Assessment/Plan  Hypophosphatemia  Assessment & Plan  PO4: 2.4  Will start supplements  Monitor    Hypokalemia  Assessment & Plan  K+: 3.3  Will start supplements  Note: pt has a hx of hypo-K+ (unknown reason) and takes supplements at home  Monitor    Leukocytosis  Assessment & Plan  WBC's have been elevated since   WBC's: 17.1 () --> 15.4 --> 14.9 --> 16.1 --> 16.5 () --> 14.6 () --> 12.7 ()  Has been afebrile  Denies cough  Has some loose stools  U/A (+) --> has hx of recurrent UTI's - see other assessment  C. Diff neg ()  CXR: unremarkable  Procalcitonin: wnl levels  On Imodium prn  Note: has been on Ancef for recent UTI -- off   Cont to monitor    Vitamin D insufficiency  Assessment & Plan  Vit D: 29  On supplements    Essential hypertension, benign- (present on admission)  Assessment & Plan  BP better but occ rises up  On Toprol XL  On Cozaar  On Norvasc ()  Note also on Flomax  Cont to monitor    Benign prostatic hyperplasia with urinary obstruction- (present on admission)  Assessment & Plan  On Flomax    GERD with esophagitis- (present on admission)  Assessment & Plan  On Prilosec    Recurrent UTI- (present on admission)  Assessment & Plan  Has hx of recurrent UTI  Was adm to [facility] for UTI, diarrhea, and confusion  UC --> Staph Aureus (and U/A showed yeast)  Repeat UC --> show Candida   S/P Ancef ()  On Diflucan (thru )  Note: pt self caths at home    Hypomagnesemia- (present on admission)  Assessment & Plan  M.1  Will start supplements  Note: pt has a hx of hypo-mg (unknown reason) and takes supplements at  home  Monitor    Type 2 diabetes mellitus, with long-term current use of insulin (HCC)- (present on admission)  Assessment & Plan  Hba1c: 6.6 (6/1)  BS:   On Glargie: 11 units qhs  Note: home meds include Trulicity 3 mg weekly, Toujeo 5-12 units daily, Humalog 5 units base and per SS  Cont to monitor

## 2022-06-05 NOTE — THERAPY
Speech Language Pathology  Daily Treatment     Patient Name: Av Bob  Age:  77 y.o., Sex:  male  Medical Record #: [id number]  Today's Date: 6/5/2022     Precautions  Precautions: Fall Risk, Swallow Precautions ( See Comments)  Comments: L eric from previous CVA, tdine    Subjective    Pt seen at bedside; pt's wife present. Pt reports not getting a good nights sleep and was out of bed in w/c from before breakfast to after lunch. Pt had just received his pain medications and was not able to maintain alertness to participate in therapy for second half of session.      Objective       06/05/22 1301   Interdisciplinary Plan of Care Collaboration   IDT Collaboration with  Nursing;Therapy Tech;Physician   Patient Position at End of Therapy In Bed;Bed Alarm On;Call Light within Reach;Tray Table within Reach;Phone within Reach;Family / Friend in Room   Collaboration Comments discussed med hold for 30; unable to participate   Therapy Missed   Missed Therapy (Minutes) 30   Reason For Missed Therapy Medical - Patient not Able To Participate   SLP Total Time Spent   SLP Individual Total Time Spent (Mins) 30   Treatment Charges   SLP Cognitive Skill Development First 15 Minutes 1   SLP Cognitive Skill Development Additional 15 Minutes 1       Assessment    Completed O-Log. Pt scored 23/30 (improvement compared to previous session, 16/30). Pt required logical cues for month and choices in FO2 for year, reason for hospitalization, and current deficits. Reviewed sequencing 4-steps from previous session- 100% accuracy. Reviewed current ST goals and encouraged pt to continue sequencing task when appropriate. Pt became increasingly drowsy and unable to maintain alertness or participate in second half of session.        Plan    Continue O-log, continue sequencing task, target safety awareness and problem solving     Passport items to be completed:  Express basic needs, understand food/liquid recommendations, consistently  follow swallow precautions, manage finances, manage medications, arrive to therapy appointments on time, complete daily memory log entries, solve problems related to safety situations, review education related to hospitalization, complete caregiver training     Speech Therapy Problems (Active)       Problem: Memory STGs       Dates: Start: 06/01/22         Goal: STG-Within one week, patient will remember safety precautions related to hospitalization with 75% accuracy.        Dates: Start: 06/01/22         Goal Note filed on 06/01/22 1448 by Harpal Williamson MS,CCC-SLP       Pt evaluated on 6/1, continue to target                 Problem: Problem Solving STGs       Dates: Start: 06/01/22         Goal: STG-Within one week, patient will complete SCCAN with appropriate goals to follow        Dates: Start: 06/01/22         Goal Note filed on 06/01/22 1448 by Harpal Williamson MS,CCC-SLP       Pt evaluated on 6/1, continue to target              Goal: STG-Within one week, patient will complete o-log with a final score of 25/30 over three consecutive days       Dates: Start: 06/01/22         Goal Note filed on 06/01/22 1448 by Harpal Williamson MS,CCC-SLP       Pt evaluated on 6/1, continue to target                 Problem: Speech/Swallowing LTGs       Dates: Start: 06/01/22         Goal: LTG-By discharge, patient will solve basic problems in order to d/c home with SPV       Dates: Start: 06/01/22

## 2022-06-05 NOTE — ASSESSMENT & PLAN NOTE
K+: 3.6 (6/19)  Off supplements  Note: pt has a hx of hypo-K+ (unknown reason) and takes supplements at home  Monitor

## 2022-06-05 NOTE — ASSESSMENT & PLAN NOTE
M.3 () --> 1.1 () --> 1.3 () --> 1.2 ()  S/P IV Mg 2g x 1 dose ( x 3 runs -- last on )  On oral supplements  Note: pt has a hx of hypo-mg (unknown reason) and takes supplements at home  Monitor

## 2022-06-06 LAB
GLUCOSE BLD STRIP.AUTO-MCNC: 118 MG/DL (ref 65–99)
GLUCOSE BLD STRIP.AUTO-MCNC: 130 MG/DL (ref 65–99)
GLUCOSE BLD STRIP.AUTO-MCNC: 144 MG/DL (ref 65–99)
GLUCOSE BLD STRIP.AUTO-MCNC: 188 MG/DL (ref 65–99)

## 2022-06-06 PROCEDURE — 700102 HCHG RX REV CODE 250 W/ 637 OVERRIDE(OP): Performed by: PHYSICAL MEDICINE & REHABILITATION

## 2022-06-06 PROCEDURE — 770010 HCHG ROOM/CARE - REHAB SEMI PRIVAT*

## 2022-06-06 PROCEDURE — 82962 GLUCOSE BLOOD TEST: CPT

## 2022-06-06 PROCEDURE — A9270 NON-COVERED ITEM OR SERVICE: HCPCS | Performed by: HOSPITALIST

## 2022-06-06 PROCEDURE — 97129 THER IVNTJ 1ST 15 MIN: CPT

## 2022-06-06 PROCEDURE — 97530 THERAPEUTIC ACTIVITIES: CPT

## 2022-06-06 PROCEDURE — 97112 NEUROMUSCULAR REEDUCATION: CPT

## 2022-06-06 PROCEDURE — 97130 THER IVNTJ EA ADDL 15 MIN: CPT

## 2022-06-06 PROCEDURE — 97535 SELF CARE MNGMENT TRAINING: CPT

## 2022-06-06 PROCEDURE — 99233 SBSQ HOSP IP/OBS HIGH 50: CPT | Performed by: PHYSICAL MEDICINE & REHABILITATION

## 2022-06-06 PROCEDURE — 99232 SBSQ HOSP IP/OBS MODERATE 35: CPT | Performed by: HOSPITALIST

## 2022-06-06 PROCEDURE — 700102 HCHG RX REV CODE 250 W/ 637 OVERRIDE(OP): Performed by: HOSPITALIST

## 2022-06-06 PROCEDURE — A9270 NON-COVERED ITEM OR SERVICE: HCPCS | Performed by: PHYSICAL MEDICINE & REHABILITATION

## 2022-06-06 RX ORDER — BACLOFEN 10 MG/1
5 TABLET ORAL 3 TIMES DAILY
Status: DISCONTINUED | OUTPATIENT
Start: 2022-06-06 | End: 2022-06-13

## 2022-06-06 RX ADMIN — Medication 1000 UNITS: at 09:03

## 2022-06-06 RX ADMIN — AMLODIPINE BESYLATE 5 MG: 5 TABLET ORAL at 05:32

## 2022-06-06 RX ADMIN — OMEPRAZOLE 20 MG: 20 CAPSULE, DELAYED RELEASE ORAL at 09:04

## 2022-06-06 RX ADMIN — DIBASIC SODIUM PHOSPHATE, MONOBASIC POTASSIUM PHOSPHATE AND MONOBASIC SODIUM PHOSPHATE 250 MG: 852; 155; 130 TABLET ORAL at 21:31

## 2022-06-06 RX ADMIN — CLOPIDOGREL 75 MG: 75 TABLET, FILM COATED ORAL at 05:33

## 2022-06-06 RX ADMIN — TAMSULOSIN HYDROCHLORIDE 0.4 MG: 0.4 CAPSULE ORAL at 09:03

## 2022-06-06 RX ADMIN — FLUCONAZOLE 100 MG: 100 TABLET ORAL at 09:04

## 2022-06-06 RX ADMIN — MICONAZOLE NITRATE 1 EACH: 20 CREAM TOPICAL at 21:31

## 2022-06-06 RX ADMIN — BACLOFEN 5 MG: 10 TABLET ORAL at 21:32

## 2022-06-06 RX ADMIN — ALLOPURINOL 100 MG: 100 TABLET ORAL at 05:32

## 2022-06-06 RX ADMIN — LOSARTAN POTASSIUM 50 MG: 25 TABLET, FILM COATED ORAL at 09:03

## 2022-06-06 RX ADMIN — MAGNESIUM OXIDE TAB 400 MG (241.3 MG ELEMENTAL MG) 400 MG: 400 (241.3 MG) TAB at 21:32

## 2022-06-06 RX ADMIN — ATORVASTATIN CALCIUM 80 MG: 40 TABLET, FILM COATED ORAL at 21:31

## 2022-06-06 RX ADMIN — DIBASIC SODIUM PHOSPHATE, MONOBASIC POTASSIUM PHOSPHATE AND MONOBASIC SODIUM PHOSPHATE 250 MG: 852; 155; 130 TABLET ORAL at 09:03

## 2022-06-06 RX ADMIN — INSULIN HUMAN 1 UNITS: 100 INJECTION, SOLUTION PARENTERAL at 11:41

## 2022-06-06 RX ADMIN — POTASSIUM CHLORIDE 20 MEQ: 1500 TABLET, EXTENDED RELEASE ORAL at 09:04

## 2022-06-06 RX ADMIN — FLUOXETINE 40 MG: 20 CAPSULE ORAL at 09:04

## 2022-06-06 RX ADMIN — BACLOFEN 5 MG: 10 TABLET ORAL at 16:03

## 2022-06-06 RX ADMIN — SENNOSIDES AND DOCUSATE SODIUM 2 TABLET: 50; 8.6 TABLET ORAL at 09:04

## 2022-06-06 RX ADMIN — MICONAZOLE NITRATE: 20 CREAM TOPICAL at 09:24

## 2022-06-06 RX ADMIN — SENNOSIDES AND DOCUSATE SODIUM 2 TABLET: 50; 8.6 TABLET ORAL at 21:32

## 2022-06-06 RX ADMIN — RIVAROXABAN 10 MG: 10 TABLET, FILM COATED ORAL at 18:00

## 2022-06-06 RX ADMIN — MAGNESIUM OXIDE TAB 400 MG (241.3 MG ELEMENTAL MG) 400 MG: 400 (241.3 MG) TAB at 09:03

## 2022-06-06 RX ADMIN — INSULIN GLARGINE-YFGN 11 UNITS: 100 INJECTION, SOLUTION SUBCUTANEOUS at 21:36

## 2022-06-06 ASSESSMENT — ENCOUNTER SYMPTOMS
CHILLS: 0
NAUSEA: 0
SHORTNESS OF BREATH: 0
NERVOUS/ANXIOUS: 0
ABDOMINAL PAIN: 0
VOMITING: 0
FEVER: 0
DIARRHEA: 0

## 2022-06-06 ASSESSMENT — PAIN DESCRIPTION - PAIN TYPE: TYPE: ACUTE PAIN

## 2022-06-06 NOTE — CARE PLAN
"  Problem: Knowledge Deficit - Standard  Goal: Patient and family/care givers will demonstrate understanding of plan of care, disease process/condition, diagnostic tests and medications  Outcome: Progressing  Note: Pt agrees with plan of care tonight regarding medications and safety.  Will continue to monitor patient.      Problem: Fall Risk - Rehab  Goal: Patient will remain free from falls  Outcome: Progressing  Note: Camilla Gruber Fall risk Assessment Score:  17    High fall risk Interventions   - Alarming seatbelt  - Wander guard  - Bed and strip alarm   - Yellow sign by the door   - Yellow wrist band \"Fall risk\"  - Room near to the nurse station  - Do not leave patient unattended in the bathroom  - Fall risk education provided         The patient is Stable - Low risk of patient condition declining or worsening    Shift Goals  Clinical Goals: Safety  Patient Goals: Safety    Progress made toward(s) clinical / shift goals:  progressing          "

## 2022-06-06 NOTE — CARE PLAN
"  Problem: Fall Risk - Rehab  Goal: Patient will remain free from falls  Outcome: Progressing  Camilla Gruber Fall risk Assessment Score: 17    High fall risk Interventions   - Alarming seatbelt  - Wander guard  - Bed and strip alarm   - Yellow sign by the door   - Yellow wrist band \"Fall risk\"  - Room near to the nurse station  - Do not leave patient unattended in the bathroom  - Fall risk education provided            Problem: Skin Integrity  Goal: Patient's skin integrity will be maintained or improve  Outcome: Progressing   Patient's groin and buttocks skin moisture rash is improving but still requires attention. Patient is incontinent and needs to be changed often to keep skin rash from flaring up again. Vinny cream applied.       "

## 2022-06-06 NOTE — THERAPY
"Occupational Therapy  Daily Treatment     Patient Name: Av Bob  Age:  77 y.o., Sex:  male  Medical Record #: [id number]  Today's Date: 6/6/2022     Precautions  Precautions: Fall Risk, Swallow Precautions ( See Comments)  Comments: L eric from previous CVA, tdine    Safety   ADL Safety : Requires Physical Assist for Safety (2 person)    Subjective    \"I'm tired. I normally wake up at 10 AM\"     Objective       06/06/22 0701   Safety    ADL Safety  Requires Physical Assist for Safety  (2 person)   Cognition    Level of Consciousness Alert   Functional Level of Assist   Eating Stand by Assist   Grooming   (Declined need)   Upper Body Dressing Moderate Assist  (EOB. CGA To doff/Max to don)   Lower Body Dressing Total Assist x 2  (Supine)   Toileting   (Declined need. Brief changed, no output noted)   Bed, Chair, Wheelchair Transfer Total Assist X 2  (Mod1 Min 1 start of session. Max 1 Mod 1 end of session)   Neuro-Muscular Treatments   Neuro-Muscular Treatments Anterior weight shift;Facilitation;Postural Changes;Postural Facilitation;Sequencing;Tactile Cuing;Verbal Cuing   Comments Edge of mat: seated retrieving cone from left and stacking on right hand of therapist 2 feet in front of him. Max cues. 15 min. 5 cones x2   Bed Mobility    Supine to Sit Maximal Assist  (To R )   Interdisciplinary Plan of Care Collaboration   IDT Collaboration with  Therapy Tech;Nursing   Patient Position at End of Therapy Seated;Chair Alarm On;Self Releasing Lap Belt Applied;Call Light within Reach;Tray Table within Reach   Collaboration Comments Re: pts cognition and blood sugar check   OT Total Time Spent   OT Individual Total Time Spent (Mins) 60   OT Charge Group   OT Self Care / ADL 3   OT Neuromuscular Re-education / Balance 1       Assessment    This was this writer's first time working with pt. Pt is slow to process and requires max repetitive cues to maintain attention to task. Pt forgets what he is doing mid roll. " Pt reports he was walking at home for exercise. Pt is still far from  his baseline and will need continued intensive therapy. Anterior R weight shift better than AnterioL weight shift.    Strengths: Motivated for self care and independence, Pleasant and cooperative, Supportive family, Willingly participates in therapeutic activities  Barriers: Confused, Decreased endurance, Fatigue, Generalized weakness, Impaired activity tolerance, Impaired balance, Impaired functional cognition, Limited mobility    Plan    Attention to task, ADLs, rolling, 1 step directions, anterior weight shift L and R for progression of functional transfers, seated balance, STS,    Passport items to be completed:  Perform bathroom transfers, complete dressing, complete feeding, get ready for the day, prepare a simple meal, participate in household tasks, adapt home for safety needs, demonstrate home exercise program, complete caregiver training     Occupational Therapy Goals (Active)       Problem: Bathing       Dates: Start: 06/01/22         Goal: STG-Within one week, patient will bathe w/ max A using DME as needed.        Dates: Start: 06/01/22               Problem: Dressing       Dates: Start: 06/01/22         Goal: STG-Within one week, patient will dress UB w/ mod A.       Dates: Start: 06/01/22            Goal: STG-Within one week, patient will dress LB w/ mod A.        Dates: Start: 06/01/22               Problem: Functional Transfers       Dates: Start: 06/01/22         Goal: STG-Within one week, patient will transfer to toilet w/ max A using DME as needed.       Dates: Start: 06/01/22            Goal: STG-Within one week, patient will transfer to step in shower with max A using DME as needed.       Dates: Start: 06/01/22               Problem: OT Long Term Goals       Dates: Start: 06/01/22         Goal: LTG-By discharge, patient will complete basic self care tasks with min A using DME and AE as needed.       Dates: Start: 06/01/22             Goal: LTG-By discharge, patient will perform bathroom transfers w/ min A using DME and AE as needed.       Dates: Start: 06/01/22               Problem: Toileting       Dates: Start: 06/01/22         Goal: STG-Within one week, patient will complete toileting tasks w/ max A using DME as needed.        Dates: Start: 06/01/22

## 2022-06-06 NOTE — PROGRESS NOTES
[facility] Medicine Daily Progress Note    Date of Service  6/6/2022    Chief Complaint:  Hypertension  Diabetes  Leukocytosis    Interval History:  Has general pain this am -- will d/w Physiatrist.    Review of Systems  Review of Systems   Constitutional: Negative for chills and fever.   Respiratory: Negative for shortness of breath.    Cardiovascular: Negative for chest pain.   Gastrointestinal: Negative for abdominal pain, diarrhea, nausea and vomiting.   Psychiatric/Behavioral: The patient is not nervous/anxious.         Physical Exam  Temp:  [36.6 °C (97.8 °F)-36.8 °C (98.2 °F)] 36.8 °C (98.2 °F)  Pulse:  [54-62] 60  Resp:  [16-18] 18  BP: ()/(57-76) 145/64  SpO2:  [96 %-97 %] 97 %    Physical Exam  Vitals and nursing note reviewed.   Constitutional:       Appearance: Normal appearance.   HENT:      Head: Atraumatic.   Eyes:      Conjunctiva/sclera: Conjunctivae normal.      Pupils: Pupils are equal, round, and reactive to light.   Cardiovascular:      Rate and Rhythm: Normal rate and regular rhythm.      Heart sounds: No murmur heard.  Pulmonary:      Effort: Pulmonary effort is normal.      Breath sounds: No stridor. No wheezing or rales.   Abdominal:      General: There is no distension.      Palpations: Abdomen is soft.      Tenderness: There is no abdominal tenderness.   Musculoskeletal:      Cervical back: Normal range of motion and neck supple.      Right lower leg: No edema.      Left lower leg: No edema.   Skin:     General: Skin is warm and dry.      Findings: No rash.   Neurological:      Mental Status: He is alert and oriented to person, place, and time.   Psychiatric:         Mood and Affect: Mood normal.         Behavior: Behavior normal.         Fluids    Intake/Output Summary (Last 24 hours) at 6/6/2022 1001  Last data filed at 6/6/2022 0900  Gross per 24 hour   Intake 920 ml   Output --   Net 920 ml       Laboratory  Recent Labs     06/05/22  0533   WBC 12.7*   RBC 3.94*   HEMOGLOBIN 11.3*    HEMATOCRIT 33.7*   MCV 85.5   MCH 28.7   MCHC 33.5*   RDW 44.1   PLATELETCT 441   MPV 9.4     Recent Labs     22  0533   SODIUM 136   POTASSIUM 3.4*   CHLORIDE 104   CO2 22   GLUCOSE 79   BUN 23*   CREATININE 1.06   CALCIUM 8.4*                   Imaging    Assessment/Plan  Hypophosphatemia  Assessment & Plan  PO4: 2.4  On supplements  Monitor    Hypokalemia  Assessment & Plan  K+: 3.3  On supplements  Note: pt has a hx of hypo-K+ (unknown reason) and takes supplements at home  Monitor    Leukocytosis  Assessment & Plan  WBC's have been elevated since   WBC's: 17.1 () --> 15.4 --> 14.9 --> 16.1 --> 16.5 () --> 14.6 () --> 12.7 ()  Has been afebrile  Denies cough  Has some loose stools  U/A (+) --> has hx of recurrent UTI's - see other assessment  C. Diff neg ()  CXR: unremarkable  Procalcitonin: wnl levels  On Imodium prn  Note: had been on Ancef for recent UTI (off )  Cont to monitor    Vitamin D insufficiency  Assessment & Plan  Vit D: 29  On supplements    Essential hypertension, benign- (present on admission)  Assessment & Plan  BP better but occ rises up  On Toprol XL  On Cozaar  On Norvasc ()  Note also on Flomax  Cont to monitor    Benign prostatic hyperplasia with urinary obstruction- (present on admission)  Assessment & Plan  On Flomax    GERD with esophagitis- (present on admission)  Assessment & Plan  On Prilosec    Recurrent UTI- (present on admission)  Assessment & Plan  Has hx of recurrent UTI  Was adm to [facility] for UTI, diarrhea, and confusion  UC --> Staph Aureus (and U/A showed yeast)  Repeat UC --> show Candida   S/P Ancef ()  On Diflucan (thru )  Note: pt self caths at home    Hypomagnesemia- (present on admission)  Assessment & Plan  M.1  On supplements  Note: pt has a hx of hypo-mg (unknown reason) and takes supplements at home  Monitor    Type 2 diabetes mellitus, with long-term current use of insulin (HCC)- (present on admission)  Assessment &  Plan  Hba1c: 6.6 (6/1)  BS: 103-166 (hit 74 x 1)  On Glargie: 11 units qhs  Note: home meds include Trulicity 3 mg weekly, Toujeo 5-12 units daily, Humalog 5 units base and per SS  Cont to monitor

## 2022-06-06 NOTE — PROGRESS NOTES
Rehab Progress Note     Encounter Date: 6/6/2022    CC: Decreased mobility, confusion    Interval Events (Subjective)  Patient sitting up in room. He still requires heavy assistance for transfers.  He reports therapy is going OK. He reports his SBP was elevated today. Discussed case with hospitalist and he was just started on CCB over the weekend. He is continuing to have severe spasticity. Discussed with PT about serial casting.     Objective:  VITAL SIGNS: BP (!) 145/64   Pulse 60   Temp 36.8 °C (98.2 °F) (Oral)   Resp 18   Ht 1.829 m (6')   Wt 85.5 kg (188 lb 7.9 oz)   SpO2 97%   BMI 25.56 kg/m²   Gen: NAD  Psych: Mood and affect appropriate  CV: RRR, no edema  Resp: CTAB, no upper airway sounds  Abd: NTND  Neuro: AOx3, 2 person assist to transfer from bed to chair    Recent Results (from the past 72 hour(s))   POCT glucose device results    Collection Time: 06/03/22  5:06 PM   Result Value Ref Range    POC Glucose, Blood 125 (H) 65 - 99 mg/dL   POCT glucose device results    Collection Time: 06/03/22  9:04 PM   Result Value Ref Range    POC Glucose, Blood 182 (H) 65 - 99 mg/dL   POCT glucose device results    Collection Time: 06/04/22  7:19 AM   Result Value Ref Range    POC Glucose, Blood 103 (H) 65 - 99 mg/dL   POCT glucose device results    Collection Time: 06/04/22 11:29 AM   Result Value Ref Range    POC Glucose, Blood 105 (H) 65 - 99 mg/dL   POCT glucose device results    Collection Time: 06/04/22  5:26 PM   Result Value Ref Range    POC Glucose, Blood 108 (H) 65 - 99 mg/dL   POCT glucose device results    Collection Time: 06/04/22 10:07 PM   Result Value Ref Range    POC Glucose, Blood 157 (H) 65 - 99 mg/dL   CBC WITH DIFFERENTIAL    Collection Time: 06/05/22  5:33 AM   Result Value Ref Range    WBC 12.7 (H) 4.8 - 10.8 K/uL    RBC 3.94 (L) 4.70 - 6.10 M/uL    Hemoglobin 11.3 (L) 14.0 - 18.0 g/dL    Hematocrit 33.7 (L) 42.0 - 52.0 %    MCV 85.5 81.4 - 97.8 fL    MCH 28.7 27.0 - 33.0 pg    MCHC 33.5  (L) 33.7 - 35.3 g/dL    RDW 44.1 35.9 - 50.0 fL    Platelet Count 441 164 - 446 K/uL    MPV 9.4 9.0 - 12.9 fL    Neutrophils-Polys 79.20 (H) 44.00 - 72.00 %    Lymphocytes 10.40 (L) 22.00 - 41.00 %    Monocytes 6.20 0.00 - 13.40 %    Eosinophils 2.40 0.00 - 6.90 %    Basophils 0.50 0.00 - 1.80 %    Immature Granulocytes 1.30 (H) 0.00 - 0.90 %    Nucleated RBC 0.00 /100 WBC    Neutrophils (Absolute) 10.06 (H) 1.82 - 7.42 K/uL    Lymphs (Absolute) 1.32 1.00 - 4.80 K/uL    Monos (Absolute) 0.78 0.00 - 0.85 K/uL    Eos (Absolute) 0.30 0.00 - 0.51 K/uL    Baso (Absolute) 0.06 0.00 - 0.12 K/uL    Immature Granulocytes (abs) 0.16 (H) 0.00 - 0.11 K/uL    NRBC (Absolute) 0.00 K/uL   Basic Metabolic Panel    Collection Time: 06/05/22  5:33 AM   Result Value Ref Range    Sodium 136 135 - 145 mmol/L    Potassium 3.4 (L) 3.6 - 5.5 mmol/L    Chloride 104 96 - 112 mmol/L    Co2 22 20 - 33 mmol/L    Glucose 79 65 - 99 mg/dL    Bun 23 (H) 8 - 22 mg/dL    Creatinine 1.06 0.50 - 1.40 mg/dL    Calcium 8.4 (L) 8.5 - 10.5 mg/dL    Anion Gap 10.0 7.0 - 16.0   MAGNESIUM    Collection Time: 06/05/22  5:33 AM   Result Value Ref Range    Magnesium 1.1 (L) 1.5 - 2.5 mg/dL   PHOSPHORUS    Collection Time: 06/05/22  5:33 AM   Result Value Ref Range    Phosphorus 2.4 (L) 2.5 - 4.5 mg/dL   ESTIMATED GFR    Collection Time: 06/05/22  5:33 AM   Result Value Ref Range    GFR (CKD-EPI) 73 >60 mL/min/1.73 m 2   POCT glucose device results    Collection Time: 06/05/22  7:22 AM   Result Value Ref Range    POC Glucose, Blood 74 65 - 99 mg/dL   POCT glucose device results    Collection Time: 06/05/22 11:34 AM   Result Value Ref Range    POC Glucose, Blood 166 (H) 65 - 99 mg/dL   POCT glucose device results    Collection Time: 06/05/22  5:01 PM   Result Value Ref Range    POC Glucose, Blood 118 (H) 65 - 99 mg/dL   POCT glucose device results    Collection Time: 06/05/22  9:58 PM   Result Value Ref Range    POC Glucose, Blood 156 (H) 65 - 99 mg/dL   POCT  glucose device results    Collection Time: 06/06/22  7:34 AM   Result Value Ref Range    POC Glucose, Blood 130 (H) 65 - 99 mg/dL   POCT glucose device results    Collection Time: 06/06/22 11:10 AM   Result Value Ref Range    POC Glucose, Blood 188 (H) 65 - 99 mg/dL       Current Facility-Administered Medications   Medication Frequency   • potassium chloride SA (Kdur) tablet 20 mEq DAILY   • magnesium oxide tablet 400 mg BID   • phosphorus (K-Phos-Neutral) per tablet 250 mg BID   • amLODIPine (NORVASC) tablet 5 mg Q DAY   • loperamide (IMODIUM) capsule 2 mg 4X/DAY PRN   • lidocaine (LIDODERM) 5 % 1 Patch Q24HR   • miconazole 2%-zinc oxide (Vinny) topical cream BID   • vitamin D3 (cholecalciferol) tablet 1,000 Units DAILY   • Respiratory Therapy Consult Continuous RT   • Pharmacy Consult Request ...Pain Management Review 1 Each PHARMACY TO DOSE   • hydrALAZINE (APRESOLINE) tablet 25 mg Q8HRS PRN   • acetaminophen (Tylenol) tablet 650 mg Q4HRS PRN   • senna-docusate (PERICOLACE or SENOKOT S) 8.6-50 MG per tablet 2 Tablet BID    And   • polyethylene glycol/lytes (MIRALAX) PACKET 1 Packet QDAY PRN    And   • magnesium hydroxide (MILK OF MAGNESIA) suspension 30 mL QDAY PRN    And   • bisacodyl (DULCOLAX) suppository 10 mg QDAY PRN   • omeprazole (PRILOSEC) capsule 20 mg DAILY   • artificial tears ophthalmic solution 1 Drop PRN   • benzocaine-menthol (Cepacol) lozenge 1 Lozenge Q2HRS PRN   • mag hydrox-al hydrox-simeth (MAALOX PLUS ES or MYLANTA DS) suspension 20 mL Q2HRS PRN   • ondansetron (ZOFRAN ODT) dispertab 4 mg 4X/DAY PRN    Or   • ondansetron (ZOFRAN) syringe/vial injection 4 mg 4X/DAY PRN   • traZODone (DESYREL) tablet 50 mg QHS PRN   • sodium chloride (OCEAN) 0.65 % nasal spray 2 Spray PRN   • oxyCODONE immediate-release (ROXICODONE) tablet 5 mg Q3HRS PRN    Or   • oxyCODONE immediate release (ROXICODONE) tablet 10 mg Q3HRS PRN   • midazolam (VERSED) 5 mg/mL (1 mL vial) PRN   • acetaminophen (Tylenol) tablet 650  mg Q6HRS PRN   • allopurinol (ZYLOPRIM) tablet 100 mg QDAY   • atorvastatin (LIPITOR) tablet 80 mg Q EVENING   • clopidogrel (PLAVIX) tablet 75 mg QDAY   • FLUoxetine (PROZAC) capsule 40 mg DAILY   • gabapentin (NEURONTIN) capsule 100 mg QHS PRN   • insulin GLARGINE (Lantus,Semglee) injection Q EVENING   • insulin regular (HumuLIN R,NovoLIN R) injection 4X/DAY ACHS    And   • dextrose 50% (D50W) injection 25 g Q15 MIN PRN   • losartan (COZAAR) tablet 50 mg BID   • metoprolol SR (TOPROL XL) tablet 100 mg Q EVENING   • rivaroxaban (XARELTO) tablet 10 mg DAILY AT 1800   • tamsulosin (FLOMAX) capsule 0.4 mg DAILY       Orders Placed This Encounter   Procedures   • Diet Order Diet: Cardiac; Second Modifier: (optional): Consistent CHO (Diabetic)     Standing Status:   Standing     Number of Occurrences:   1     Order Specific Question:   Diet:     Answer:   Cardiac [6]     Order Specific Question:   Second Modifier: (optional)     Answer:   Consistent CHO (Diabetic) [4]       Assessment:  Active Hospital Problems    Diagnosis    • *Acute encephalopathy    • Hyponatremia    • Dehydration    • Essential hypertension, benign    • Benign prostatic hyperplasia with urinary obstruction    • Diarrhea    • GERD with esophagitis    • Recurrent UTI    • Hypomagnesemia    • Type 2 diabetes mellitus, with long-term current use of insulin (HCC)    • Stage 3b chronic kidney disease (HCC)        Medical Decision Making and Plan:  Acute toxic encephalopathy - Patient with UTI with urosepsis s/p IV antibiotics. Patient has urinary retention and requires occasional catheterization putting him at risk for recurrent UTIs. 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 On Plavix and statin  -Serial casting with PT/OT.  Will start low dose Baclofen although  most likely contracture     HTN/CAD - Patient on Plavix. Patient on Losartan 50 mg BID, Metoprolol 100 mg XL  -Consult Hospitalist     HLD - Patient on Atorvastatin 80 mg QHS     Leukocytosis - On treatment for UTI. On Ancef with recommendation to switch to Augment. Will check CBC in AM before switch  -Repeat 16.5 up from 16.1, consult Hospitalist     Anemia - Check AM CBC - 12.2      DM2 with hyperglycemia - Patient on Glargine 11 U and SSI  -Consult hospitalist     Depression - Patient on Fluoxetine 40 mg daily      Hx of Gout - Patient on Allopurinol 100 mg daily     Urinary Retention - Patient requiring catheterization ~1 time daily. Follows with Urology. Continue Flomax     Vitamin D Deficiency - 29 on admission. Start 1000 U     DVT Ppx - Patient on Xarelto ppx dose.     Total time:  36 minutes.  I spent greater than 50% of the time for patient care, counseling, and coordination on this date, including unit/floor time, and face-to-face time with the patient as per interval events and assessment and plan above. Topics discussed included heavy assistance for transfers, start baclofen, discussed with PT, and serial casting.     Katheryn Pratt M.D.

## 2022-06-06 NOTE — THERAPY
"Speech Language Pathology  Daily Treatment     Patient Name: Av Bob  Age:  77 y.o., Sex:  male  Medical Record #: [id number]  Today's Date: 6/6/2022     Precautions  Precautions: Fall Risk, Swallow Precautions ( See Comments)  Comments: L eric from previous CVA    Subjective    Pt willing to participate at bedside, pt's wife present and participated in discussion about pt's cognitive baseline      Objective       06/06/22 1301   Interdisciplinary Plan of Care Collaboration   IDT Collaboration with  Family / Caregiver   Patient Position at End of Therapy In Bed;Family / Friend in Room   Collaboration Comments Pt's wife present at bedside for ST session   SLP Total Time Spent   SLP Individual Total Time Spent (Mins) 30   Treatment Charges   SLP Cognitive Skill Development First 15 Minutes 1   SLP Cognitive Skill Development Additional 15 Minutes 1       Assessment    Pt asleep in bed at the beginning of this session, pt's SO at bedside.  Pt's SO participated in a conversation about pt's baseline cognition.  She reported that she feels as though his processing is slower now, but pretty close to where it was prior to his recent hospitalization and that his physical abilities are still very far from his baseline.  She also reports that pt's processing improves one on one with her, outside of therapy sessions.  At this time pt and pt's SO are agreeable to decrease ST to 30 minutes as pt's barriers to discharging home are physical (pt's SO manages all IADLs previously).      Reviewed memory notebook with pt, including his OT and PT sessions.  Pt is quick to respond \"I don't know\" when asked to recall specific information from therapy sessions and required mod-max encouragement to attempt to recall tasks.  Pt was initially unable to recall any tasks completed in PT; however when asked about standing in the standing frame independently recalled that he stood for \"5 minutes and then 3 minutes\".       "     Plan    Reduce ST to 30 minutes, target memory log, O-log     Speech Therapy Problems (Active)       Problem: Memory STGs       Dates: Start: 06/01/22         Goal: STG-Within one week, patient will remember safety precautions related to hospitalization with 75% accuracy.        Dates: Start: 06/01/22         Goal Note filed on 06/01/22 1448 by Harpal Williamson MS,CCC-SLP       Pt evaluated on 6/1, continue to target                 Problem: Problem Solving STGs       Dates: Start: 06/01/22         Goal: STG-Within one week, patient will complete SCCAN with appropriate goals to follow        Dates: Start: 06/01/22         Goal Note filed on 06/01/22 1448 by Harpal Williamson MS,CCC-SLP       Pt evaluated on 6/1, continue to target              Goal: STG-Within one week, patient will complete o-log with a final score of 25/30 over three consecutive days       Dates: Start: 06/01/22         Goal Note filed on 06/01/22 1448 by Harpal Williamson MS,CCC-SLP       Pt evaluated on 6/1, continue to target                 Problem: Speech/Swallowing LTGs       Dates: Start: 06/01/22         Goal: LTG-By discharge, patient will solve basic problems in order to d/c home with SPV       Dates: Start: 06/01/22

## 2022-06-06 NOTE — CARE PLAN
Problem: Knowledge Deficit - Standard  Goal: Patient and family/care givers will demonstrate understanding of plan of care, disease process/condition, diagnostic tests and medications  Outcome: Progressing   Wife shown the use of the nelly lift for transfers.    Problem: Skin Integrity  Goal: Skin integrity is maintained or improved  Outcome: Progressing  Vinny cream to buttocks. New cushion placed by PT for coccyx pain

## 2022-06-06 NOTE — PROGRESS NOTES
Received bedside shift report from Sri BURT RN regarding patient and assumed care. Patient awake, calm and stable, currently positioned in bed for comfort and safety; call light within reach. Denies pain or discomfort at this time. Will continue to monitor.

## 2022-06-06 NOTE — THERAPY
Speech Language Pathology  Daily Treatment     Patient Name: Av Bob  Age:  77 y.o., Sex:  male  Medical Record #: [id number]  Today's Date: 6/6/2022     Precautions  Precautions: Fall Risk, Swallow Precautions ( See Comments)  Comments: L eric from previous CVA, tdine    Subjective    Pt in bed sleeping upon arrival, therapy completed at bedside, max cues to maintain wakefulness with pt frequently closing eyes and falling asleep mid activity.      Objective       06/06/22 0933   SLP Total Time Spent   SLP Individual Total Time Spent (Mins) 30   Treatment Charges   SLP Cognitive Skill Development First 15 Minutes 1   SLP Cognitive Skill Development Additional 15 Minutes 1       Assessment    Orientation log completed with decreased score of 21/30. Pt required cues specifically for month, date, year and current hospitalization along with impairments. Pt introduced to memory log, SLP acting as scribe. Max cues to maintain wakefulness and participate in therapy. Pt required MAX cues to recall breakfast, unable to recall morning OT session and max cues to explain what was completed during this SLP session.          Plan    Cont to address orientation, simple memory and attention skills.     Speech Therapy Problems (Active)       Problem: Memory STGs       Dates: Start: 06/01/22         Goal: STG-Within one week, patient will remember safety precautions related to hospitalization with 75% accuracy.        Dates: Start: 06/01/22         Goal Note filed on 06/01/22 1448 by Harpal Williamson MS,CCC-SLP       Pt evaluated on 6/1, continue to target                 Problem: Problem Solving STGs       Dates: Start: 06/01/22         Goal: STG-Within one week, patient will complete SCCAN with appropriate goals to follow        Dates: Start: 06/01/22         Goal Note filed on 06/01/22 1448 by Harpal Williamson MS,CCC-SLP       Pt evaluated on 6/1, continue to target              Goal: STG-Within one week, patient will  complete o-log with a final score of 25/30 over three consecutive days       Dates: Start: 06/01/22         Goal Note filed on 06/01/22 6418 by Harpal Williamson MS,CCC-SLP       Pt evaluated on 6/1, continue to target                 Problem: Speech/Swallowing LTGs       Dates: Start: 06/01/22         Goal: LTG-By discharge, patient will solve basic problems in order to d/c home with SPV       Dates: Start: 06/01/22

## 2022-06-06 NOTE — THERAPY
Physical Therapy   Daily Treatment     Patient Name: Av Bob  Age:  77 y.o., Sex:  male  Medical Record #: [id number]  Today's Date: 6/6/2022     Precautions  Precautions: (P) Fall Risk, Swallow Precautions ( See Comments)  Comments: (P) L eric from previous CVA    Subjective    Patient in bed, agreeable to therapy with encouragement.     Objective       06/06/22 1031   Precautions   Precautions Fall Risk;Swallow Precautions ( See Comments)   Comments L eric from previous CVA   Pain 0 - 10 Group   Location Generalized   Transfer Functional Level of Assist   Bed, Chair, Wheelchair Transfer Total Assist X 2  (Max Ax1, Min Ax1)   Bed Chair Wheelchair Transfer Description Increased time;Initial preparation for task;Squat pivot transfer to wheelchair;Verbal cueing;Supervision for safety  (fluctuates with fatigue, difficulty weight shifting forward)   Bed Mobility    Supine to Sit Maximal Assist   Sit to Stand Maximal Assist   Scooting Maximal Assist   Rolling Moderate Assist to Rt.;Moderate Assist to Lt.  (with bedrails)   Interdisciplinary Plan of Care Collaboration   IDT Collaboration with  Family / Caregiver;Nursing;Physical Therapist;Physician   Patient Position at End of Therapy Seated;Self Releasing Lap Belt Applied;Chair Alarm On;Call Light within Reach;Tray Table within Reach;Phone within Reach;Family / Friend in Room   Collaboration Comments CLOF, serial casting order   PT Total Time Spent   PT Individual Total Time Spent (Mins) 60   PT Charge Group   PT Neuromuscular Re-Education / Balance 1   PT Therapeutic Activities 3     Rolling and bridging several times for incontinence management.    Bariatric standing frame for 5 minutes and 3 minutes for standing/upright tolerance. LUE ROM/stretching during standing as well as during seated rest breaks.    Assessment    Patient tolerated session fairly, requires encouragement to participate due to fatigue/pain. Patient crying out during standing frame  however able to be briefly distracted to increase tolerance.    Strengths: Able to follow instructions, Motivated for self care and independence, Pleasant and cooperative, Supportive family, Willingly participates in therapeutic activities  Barriers: Bladder incontinence, Decreased endurance, Hemiparesis, Impaired activity tolerance, Impaired balance, Limited mobility    Plan    Bed mobility training, transfer training (squat pivot currently), standing tolerance/balance, Function in Sitting Test, gait training (currently using R wall rail), ROM/stretching, neuro re-ed for L hemibody, encourage anterior weight shifting/leaning, plan for serial casting pending LUE skin integrity.     Passport items to be completed:  Get in/out of bed safely, in/out of a vehicle, safely use mobility device, walk or wheel around home/community, navigate up and down stairs, show how to get up/down from the ground, ensure home is accessible, demonstrate HEP, complete caregiver training    Physical Therapy Problems (Active)       Problem: Balance       Dates: Start: 06/01/22         Goal: STG-Within one week, patient will tolerate Function in Sitting Test assessment.       Dates: Start: 06/01/22               Problem: Mobility       Dates: Start: 06/01/22         Goal: STG-Within one week, patient will propel wheelchair household distances 50 ft with Min A.       Dates: Start: 06/01/22               Problem: Mobility Transfers       Dates: Start: 06/01/22         Goal: STG-Within one week, patient will perform bed mobility with Mod A and bed functions as needed.       Dates: Start: 06/01/22            Goal: STG-Within one week, patient will transfer bed to chair with Max Ax1 via squat/stand pivot.       Dates: Start: 06/01/22               Problem: PT-Long Term Goals       Dates: Start: 06/01/22         Goal: LTG-By discharge, patient will propel wheelchair 50 ft mod I.       Dates: Start: 06/01/22            Goal: LTG-By discharge,  patient will ambulate 50 ft with LRAD and Min A.       Dates: Start: 06/01/22            Goal: LTG-By discharge, patient will transfer one surface to another with CGA and LRAD.       Dates: Start: 06/01/22            Goal: LTG-By discharge, patient will transfer in/out of a car with CGA and LRAD.       Dates: Start: 06/01/22            Goal: LTG-By discharge, patient will perform bed mobility independently.       Dates: Start: 06/01/22

## 2022-06-07 PROBLEM — M10.9 GOUT: Status: ACTIVE | Noted: 2022-06-07

## 2022-06-07 LAB
GLUCOSE BLD STRIP.AUTO-MCNC: 141 MG/DL (ref 65–99)
GLUCOSE BLD STRIP.AUTO-MCNC: 148 MG/DL (ref 65–99)
GLUCOSE BLD STRIP.AUTO-MCNC: 158 MG/DL (ref 65–99)
GLUCOSE BLD STRIP.AUTO-MCNC: 82 MG/DL (ref 65–99)

## 2022-06-07 PROCEDURE — 82962 GLUCOSE BLOOD TEST: CPT | Mod: 91

## 2022-06-07 PROCEDURE — 97535 SELF CARE MNGMENT TRAINING: CPT

## 2022-06-07 PROCEDURE — 700101 HCHG RX REV CODE 250: Performed by: PHYSICAL MEDICINE & REHABILITATION

## 2022-06-07 PROCEDURE — 97530 THERAPEUTIC ACTIVITIES: CPT

## 2022-06-07 PROCEDURE — 700111 HCHG RX REV CODE 636 W/ 250 OVERRIDE (IP): Performed by: HOSPITALIST

## 2022-06-07 PROCEDURE — 700102 HCHG RX REV CODE 250 W/ 637 OVERRIDE(OP): Performed by: HOSPITALIST

## 2022-06-07 PROCEDURE — 99233 SBSQ HOSP IP/OBS HIGH 50: CPT | Performed by: HOSPITALIST

## 2022-06-07 PROCEDURE — A9270 NON-COVERED ITEM OR SERVICE: HCPCS | Performed by: HOSPITALIST

## 2022-06-07 PROCEDURE — 97110 THERAPEUTIC EXERCISES: CPT

## 2022-06-07 PROCEDURE — 770010 HCHG ROOM/CARE - REHAB SEMI PRIVAT*

## 2022-06-07 PROCEDURE — A9270 NON-COVERED ITEM OR SERVICE: HCPCS | Performed by: PHYSICAL MEDICINE & REHABILITATION

## 2022-06-07 PROCEDURE — 99233 SBSQ HOSP IP/OBS HIGH 50: CPT | Performed by: PHYSICAL MEDICINE & REHABILITATION

## 2022-06-07 PROCEDURE — 700102 HCHG RX REV CODE 250 W/ 637 OVERRIDE(OP): Performed by: PHYSICAL MEDICINE & REHABILITATION

## 2022-06-07 PROCEDURE — 97129 THER IVNTJ 1ST 15 MIN: CPT

## 2022-06-07 PROCEDURE — 97130 THER IVNTJ EA ADDL 15 MIN: CPT

## 2022-06-07 RX ORDER — DEXTROSE MONOHYDRATE 25 G/50ML
25 INJECTION, SOLUTION INTRAVENOUS
Status: DISCONTINUED | OUTPATIENT
Start: 2022-06-07 | End: 2022-06-21 | Stop reason: HOSPADM

## 2022-06-07 RX ORDER — POTASSIUM CHLORIDE 20 MEQ/1
40 TABLET, EXTENDED RELEASE ORAL ONCE
Status: COMPLETED | OUTPATIENT
Start: 2022-06-07 | End: 2022-06-07

## 2022-06-07 RX ORDER — AMLODIPINE BESYLATE 5 MG/1
10 TABLET ORAL
Status: DISCONTINUED | OUTPATIENT
Start: 2022-06-08 | End: 2022-06-21 | Stop reason: HOSPADM

## 2022-06-07 RX ORDER — MAGNESIUM SULFATE HEPTAHYDRATE 40 MG/ML
2 INJECTION, SOLUTION INTRAVENOUS ONCE
Status: COMPLETED | OUTPATIENT
Start: 2022-06-07 | End: 2022-06-07

## 2022-06-07 RX ADMIN — OXYCODONE 5 MG: 5 TABLET ORAL at 18:14

## 2022-06-07 RX ADMIN — MAGNESIUM OXIDE TAB 400 MG (241.3 MG ELEMENTAL MG) 400 MG: 400 (241.3 MG) TAB at 08:34

## 2022-06-07 RX ADMIN — MICONAZOLE NITRATE: 20 CREAM TOPICAL at 08:36

## 2022-06-07 RX ADMIN — ALLOPURINOL 100 MG: 100 TABLET ORAL at 05:21

## 2022-06-07 RX ADMIN — BACLOFEN 5 MG: 10 TABLET ORAL at 14:45

## 2022-06-07 RX ADMIN — OXYCODONE 5 MG: 5 TABLET ORAL at 21:19

## 2022-06-07 RX ADMIN — POTASSIUM CHLORIDE 20 MEQ: 1500 TABLET, EXTENDED RELEASE ORAL at 08:35

## 2022-06-07 RX ADMIN — SENNOSIDES AND DOCUSATE SODIUM 2 TABLET: 50; 8.6 TABLET ORAL at 08:35

## 2022-06-07 RX ADMIN — BACLOFEN 5 MG: 10 TABLET ORAL at 21:19

## 2022-06-07 RX ADMIN — CLOPIDOGREL 75 MG: 75 TABLET, FILM COATED ORAL at 05:21

## 2022-06-07 RX ADMIN — MAGNESIUM SULFATE HEPTAHYDRATE 2 G: 2 INJECTION, SOLUTION INTRAVENOUS at 13:18

## 2022-06-07 RX ADMIN — OXYCODONE 5 MG: 5 TABLET ORAL at 12:21

## 2022-06-07 RX ADMIN — TAMSULOSIN HYDROCHLORIDE 0.4 MG: 0.4 CAPSULE ORAL at 08:35

## 2022-06-07 RX ADMIN — POTASSIUM CHLORIDE 40 MEQ: 1500 TABLET, EXTENDED RELEASE ORAL at 12:21

## 2022-06-07 RX ADMIN — AMLODIPINE BESYLATE 5 MG: 5 TABLET ORAL at 05:21

## 2022-06-07 RX ADMIN — OMEPRAZOLE 20 MG: 20 CAPSULE, DELAYED RELEASE ORAL at 08:35

## 2022-06-07 RX ADMIN — MICONAZOLE NITRATE 1 EACH: 20 CREAM TOPICAL at 21:29

## 2022-06-07 RX ADMIN — DIBASIC SODIUM PHOSPHATE, MONOBASIC POTASSIUM PHOSPHATE AND MONOBASIC SODIUM PHOSPHATE 250 MG: 852; 155; 130 TABLET ORAL at 08:35

## 2022-06-07 RX ADMIN — FLUOXETINE 40 MG: 20 CAPSULE ORAL at 08:35

## 2022-06-07 RX ADMIN — BACLOFEN 5 MG: 10 TABLET ORAL at 08:35

## 2022-06-07 RX ADMIN — LOSARTAN POTASSIUM 50 MG: 25 TABLET, FILM COATED ORAL at 08:35

## 2022-06-07 RX ADMIN — ATORVASTATIN CALCIUM 80 MG: 40 TABLET, FILM COATED ORAL at 21:19

## 2022-06-07 RX ADMIN — METOPROLOL SUCCINATE 100 MG: 100 TABLET, FILM COATED, EXTENDED RELEASE ORAL at 21:19

## 2022-06-07 RX ADMIN — LIDOCAINE 1 PATCH: 50 PATCH TOPICAL at 14:44

## 2022-06-07 RX ADMIN — MAGNESIUM OXIDE TAB 400 MG (241.3 MG ELEMENTAL MG) 400 MG: 400 (241.3 MG) TAB at 21:19

## 2022-06-07 RX ADMIN — ACETAMINOPHEN 650 MG: 325 TABLET ORAL at 12:21

## 2022-06-07 RX ADMIN — Medication 1000 UNITS: at 08:35

## 2022-06-07 RX ADMIN — DIBASIC SODIUM PHOSPHATE, MONOBASIC POTASSIUM PHOSPHATE AND MONOBASIC SODIUM PHOSPHATE 250 MG: 852; 155; 130 TABLET ORAL at 14:45

## 2022-06-07 RX ADMIN — DIBASIC SODIUM PHOSPHATE, MONOBASIC POTASSIUM PHOSPHATE AND MONOBASIC SODIUM PHOSPHATE 250 MG: 852; 155; 130 TABLET ORAL at 21:19

## 2022-06-07 ASSESSMENT — ENCOUNTER SYMPTOMS
SHORTNESS OF BREATH: 0
BRUISES/BLEEDS EASILY: 0
POLYDIPSIA: 0
PALPITATIONS: 0
FEVER: 0
MUSCULOSKELETAL NEGATIVE: 1
CHILLS: 0
COUGH: 0
EYES NEGATIVE: 1
NAUSEA: 0
VOMITING: 0
ABDOMINAL PAIN: 0
MEMORY LOSS: 1

## 2022-06-07 ASSESSMENT — PAIN DESCRIPTION - PAIN TYPE
TYPE: ACUTE PAIN
TYPE: ACUTE PAIN

## 2022-06-07 NOTE — PROGRESS NOTES
Hospital Medicine Daily Progress Note      Chief Complaint:  Hypertension  Diabetes  Leukocytosis    Interval History:  Staff reports pt has bloody urine in diaper.    Review of Systems  Review of Systems   Constitutional: Negative for chills and fever.   HENT: Negative.    Eyes: Negative.    Respiratory: Negative for cough and shortness of breath.    Cardiovascular: Negative for chest pain and palpitations.   Gastrointestinal: Negative for abdominal pain, nausea and vomiting.   Genitourinary:        +incontinent   Musculoskeletal: Negative.    Skin: Negative for itching and rash.   Endo/Heme/Allergies: Negative for polydipsia. Does not bruise/bleed easily.   Psychiatric/Behavioral: Positive for memory loss.        Physical Exam  Temp:  [36.3 °C (97.4 °F)-36.9 °C (98.4 °F)] 36.8 °C (98.3 °F)  Pulse:  [61-78] 69  Resp:  [18] 18  BP: (129-148)/(66-85) 148/85  SpO2:  [96 %] 96 %    Physical Exam  Vitals reviewed.   Constitutional:       General: He is not in acute distress.     Appearance: Normal appearance. He is not ill-appearing.   HENT:      Head: Normocephalic and atraumatic.      Right Ear: External ear normal.      Left Ear: External ear normal.      Nose: Nose normal.      Mouth/Throat:      Pharynx: Oropharynx is clear.   Eyes:      General:         Right eye: No discharge.         Left eye: No discharge.      Extraocular Movements: Extraocular movements intact.      Conjunctiva/sclera: Conjunctivae normal.   Cardiovascular:      Rate and Rhythm: Normal rate and regular rhythm.   Pulmonary:      Effort: Pulmonary effort is normal. No respiratory distress.      Breath sounds: Normal breath sounds. No wheezing.   Abdominal:      General: Bowel sounds are normal. There is no distension.      Tenderness: There is no abdominal tenderness.   Musculoskeletal:      Cervical back: Normal range of motion and neck supple.      Right lower leg: No edema.      Left lower leg: No edema.   Skin:     General: Skin is warm and  dry.   Neurological:      Mental Status: He is alert. He is disoriented.         Fluids    Intake/Output Summary (Last 24 hours) at 6/7/2022 1229  Last data filed at 6/7/2022 1041  Gross per 24 hour   Intake 960 ml   Output 200 ml   Net 760 ml       Laboratory  Recent Labs     06/05/22  0533   WBC 12.7*   RBC 3.94*   HEMOGLOBIN 11.3*   HEMATOCRIT 33.7*   MCV 85.5   MCH 28.7   MCHC 33.5*   RDW 44.1   PLATELETCT 441   MPV 9.4     Recent Labs     06/05/22  0533   SODIUM 136   POTASSIUM 3.4*   CHLORIDE 104   CO2 22   GLUCOSE 79   BUN 23*   CREATININE 1.06   CALCIUM 8.4*               Assessment/Plan  Gout  Assessment & Plan  On Allopurinol    Hypophosphatemia  Assessment & Plan  Increase Neutra-Phos  Check F/U labs in AM    Hypokalemia  Assessment & Plan  Replete w/ KCl  Also replacing Mg++  Outpt meds include K+ supplement    Vitamin D insufficiency  Assessment & Plan  Vit D level 29  On supplementation    Azotemia  Assessment & Plan  Encourage PO fluids  Follow renal function    Essential hypertension, benign- (present on admission)  Assessment & Plan  On Norvasc, Losartan, and Toprol  Will increase Norvasc for hypertensive trends  Monitor for orthostatic hypotension on Flomax    Normocytic anemia- (present on admission)  Assessment & Plan  Check Fe Panel  Follow H/H    GERD with esophagitis- (present on admission)  Assessment & Plan  On Prilosec    Recurrent UTI- (present on admission)  Assessment & Plan  Pt self-caths as outpt and has h/o recurrent UTI  UCx 5/24/22 >100,000 Staph Aureus, completed Ancef 6/5/22  UCx 6/1/22 ,000 Candida, completed Fluconazole 6/6/22  Leukocytosis improving  Now w/ gross hematuria  Start CBI  If Xarelto is being used for DVT proph, then consider discontinue    Depression  Assessment & Plan  On Prozac    Hypomagnesemia- (present on admission)  Assessment & Plan  On MgOx  Needs MgSO4  Outpt meds include Mg++ supplement    H/O Cerebrovascular accident (CVA) in adulthood- (present on  "admission)  Assessment & Plan  On Plavix and Lipitor    Type 2 diabetes mellitus, with long-term current use of insulin (HCC)- (present on admission)  Assessment & Plan  HbA1c 6.6  Decrease Glargine and increase SSI  Observe serum glucose trends  Outpt meds include Trulicity 3 mg weekly, Toujeo 5-12 units daily, and Humalog 5 units base and per SS    Full Code    Discussed w/ pt, bedside RN (Fina), Charge RN's (Julia and Tiffanie), and Dr. Pratt    ADDENDUM  Updated wife on pt's gross hematuria and CBI.  She reports that pt had TURP in March w/ Dr. Barry (), who indicated to her that the pt may have intermittent \"bloody urine for months.\"  "

## 2022-06-07 NOTE — THERAPY
"Occupational Therapy  Daily Treatment     Patient Name: Av Bob  Age:  77 y.o., Sex:  male  Medical Record #: [id number]  Today's Date: 6/7/2022     Precautions  Precautions: Fall Risk, Swallow Precautions ( See Comments)  Comments: L eric from previous CVA    Safety   ADL Safety : Requires Physical Assist for Safety (2 person)    Subjective    \"Owwie\" pt tearful and whining due to buttocks pain. Redness noted, but no open wound/tear. ROHO Hybrid Cushion already in place. Instructed RN Team that pt needs to be turned. May benefit from low airloss mattress.      Objective       06/07/22 0831   Non Verbal Descriptors   Non Verbal Scale  Tense Body Language;Moaning;Grimacing  (pain in buttocks. Red, but no skin tears)   Cognition    Level of Consciousness Alert   Active ROM Upper Body   Comments RUE forward flexion, abduction, elbow flex/ext, supination/pronation. completed x3 for stretch and exercise.   Functional Level of Assist   Eating Stand by Assist  (ate little. Pt was still in bed. HOB only elevated to about 60 and pt in kyphotic position. Alerted RN, CNA, and Charge. Already written on whiteboard. Now Added to yellow \"sticky\" note in pt's chart.)   Grooming   (declined)   Lower Body Dressing Total Assist x 2   Toileting Total Assist x 2  (incontinent of bloody urine)   Bed, Chair, Wheelchair Transfer Total Assist X 2  (Max of 2)   Sitting Upper Body Exercises   Comments Attempted Arm Bike, but c/o pain in buttocks so transferred back to bed in side lying   Interdisciplinary Plan of Care Collaboration   IDT Collaboration with  Physician;Nursing;Certified Nursing Assistant;Physical Therapist   Patient Position at End of Therapy In Bed;Call Light within Reach;Tray Table within Reach;Phone within Reach  (sidelying)   Collaboration Comments CLOF, POC, incontinence of urine (bloody) and bowels   Therapy Missed   Missed Therapy (Minutes) 30  (blood soaked brief. With RN Team for further evaluation of " cause)   Reason For Missed Therapy Medical - Patient on Hold from Therapy;Medical - Patient has Bowel Issues;Medical - Patient not Able To Participate  (blood soaked brief. With RN Team for further evaluation of cause)   OT Total Time Spent   OT Individual Total Time Spent (Mins) 30   OT Charge Group   OT Self Care / ADL 1   OT Therapeutic Exercise  1       Sat Edge of bed for 5 minutes for meds. CGA provided. Max of 2 for supine to sit.    Assessment    Pt not medically appropriate for therapy as they are still investigating cause of bloody brief.     Strengths: Motivated for self care and independence, Pleasant and cooperative, Supportive family, Willingly participates in therapeutic activities  Barriers: Confused, Decreased endurance, Fatigue, Generalized weakness, Impaired activity tolerance, Impaired balance, Impaired functional cognition, Limited mobility    Plan    Pressure sore prevention/mgmt, Attention to task, ADLs, rolling, 1 step directions, anterior weight shift L and R for progression of functional transfers, seated balance, STS,     Passport items to be completed:  Perform bathroom transfers, complete dressing, complete feeding, get ready for the day, prepare a simple meal, participate in household tasks, adapt home for safety needs, demonstrate home exercise program, complete caregiver training     Occupational Therapy Goals (Active)       Problem: Bathing       Dates: Start: 06/01/22         Goal: STG-Within one week, patient will bathe w/ max A using DME as needed.        Dates: Start: 06/01/22               Problem: Dressing       Dates: Start: 06/01/22         Goal: STG-Within one week, patient will dress UB w/ mod A.       Dates: Start: 06/01/22            Goal: STG-Within one week, patient will dress LB w/ mod A.        Dates: Start: 06/01/22               Problem: Functional Transfers       Dates: Start: 06/01/22         Goal: STG-Within one week, patient will transfer to toilet w/ max A  using DME as needed.       Dates: Start: 06/01/22            Goal: STG-Within one week, patient will transfer to step in shower with max A using DME as needed.       Dates: Start: 06/01/22               Problem: OT Long Term Goals       Dates: Start: 06/01/22         Goal: LTG-By discharge, patient will complete basic self care tasks with min A using DME and AE as needed.       Dates: Start: 06/01/22            Goal: LTG-By discharge, patient will perform bathroom transfers w/ min A using DME and AE as needed.       Dates: Start: 06/01/22               Problem: Toileting       Dates: Start: 06/01/22         Goal: STG-Within one week, patient will complete toileting tasks w/ max A using DME as needed.        Dates: Start: 06/01/22

## 2022-06-07 NOTE — PROGRESS NOTES
Collected urine for UA via ICP per Dr Pratt, 200mL of bloody urine, Dr Pratt came to assess, states cancel UA due to blood, will continue to monitor.

## 2022-06-07 NOTE — PROGRESS NOTES
Rehab Progress Note     Encounter Date: 6/7/2022    CC: Decreased mobility, confusion    Interval Events (Subjective)  Patient sitting up in room. He reports therapy is going OK. He had rosey urine in his brief and when they went to catheterize him he had bloody urine. Discussed would cancel the UA as most likely only blood. Discussed would discontinue Xarelto. Discussed about risks and benefits of stopping AC which is prophylaxis for DVTs. SBP elevated. Discussed anemia and SBP with hospitalist who agrees with holding xarelto and doing flushes.  Denies SOB. Denies chest pain. Denies dysuria.     Objective:  VITAL SIGNS: BP (!) 148/85   Pulse 69   Temp 36.8 °C (98.3 °F) (Oral)   Resp 18   Ht 1.829 m (6')   Wt 85.5 kg (188 lb 7.9 oz)   SpO2 96%   BMI 25.56 kg/m²   Gen: NAD  Psych: Mood and affect appropriate  CV: RRR, no edema  Resp: CTAB, no upper airway sounds  Abd: NTND  : Dried blood on penis, fresh blood in catheter bag    Recent Results (from the past 72 hour(s))   POCT glucose device results    Collection Time: 06/04/22  5:26 PM   Result Value Ref Range    POC Glucose, Blood 108 (H) 65 - 99 mg/dL   POCT glucose device results    Collection Time: 06/04/22 10:07 PM   Result Value Ref Range    POC Glucose, Blood 157 (H) 65 - 99 mg/dL   CBC WITH DIFFERENTIAL    Collection Time: 06/05/22  5:33 AM   Result Value Ref Range    WBC 12.7 (H) 4.8 - 10.8 K/uL    RBC 3.94 (L) 4.70 - 6.10 M/uL    Hemoglobin 11.3 (L) 14.0 - 18.0 g/dL    Hematocrit 33.7 (L) 42.0 - 52.0 %    MCV 85.5 81.4 - 97.8 fL    MCH 28.7 27.0 - 33.0 pg    MCHC 33.5 (L) 33.7 - 35.3 g/dL    RDW 44.1 35.9 - 50.0 fL    Platelet Count 441 164 - 446 K/uL    MPV 9.4 9.0 - 12.9 fL    Neutrophils-Polys 79.20 (H) 44.00 - 72.00 %    Lymphocytes 10.40 (L) 22.00 - 41.00 %    Monocytes 6.20 0.00 - 13.40 %    Eosinophils 2.40 0.00 - 6.90 %    Basophils 0.50 0.00 - 1.80 %    Immature Granulocytes 1.30 (H) 0.00 - 0.90 %    Nucleated RBC 0.00 /100 WBC     Neutrophils (Absolute) 10.06 (H) 1.82 - 7.42 K/uL    Lymphs (Absolute) 1.32 1.00 - 4.80 K/uL    Monos (Absolute) 0.78 0.00 - 0.85 K/uL    Eos (Absolute) 0.30 0.00 - 0.51 K/uL    Baso (Absolute) 0.06 0.00 - 0.12 K/uL    Immature Granulocytes (abs) 0.16 (H) 0.00 - 0.11 K/uL    NRBC (Absolute) 0.00 K/uL   Basic Metabolic Panel    Collection Time: 06/05/22  5:33 AM   Result Value Ref Range    Sodium 136 135 - 145 mmol/L    Potassium 3.4 (L) 3.6 - 5.5 mmol/L    Chloride 104 96 - 112 mmol/L    Co2 22 20 - 33 mmol/L    Glucose 79 65 - 99 mg/dL    Bun 23 (H) 8 - 22 mg/dL    Creatinine 1.06 0.50 - 1.40 mg/dL    Calcium 8.4 (L) 8.5 - 10.5 mg/dL    Anion Gap 10.0 7.0 - 16.0   MAGNESIUM    Collection Time: 06/05/22  5:33 AM   Result Value Ref Range    Magnesium 1.1 (L) 1.5 - 2.5 mg/dL   PHOSPHORUS    Collection Time: 06/05/22  5:33 AM   Result Value Ref Range    Phosphorus 2.4 (L) 2.5 - 4.5 mg/dL   ESTIMATED GFR    Collection Time: 06/05/22  5:33 AM   Result Value Ref Range    GFR (CKD-EPI) 73 >60 mL/min/1.73 m 2   POCT glucose device results    Collection Time: 06/05/22  7:22 AM   Result Value Ref Range    POC Glucose, Blood 74 65 - 99 mg/dL   POCT glucose device results    Collection Time: 06/05/22 11:34 AM   Result Value Ref Range    POC Glucose, Blood 166 (H) 65 - 99 mg/dL   POCT glucose device results    Collection Time: 06/05/22  5:01 PM   Result Value Ref Range    POC Glucose, Blood 118 (H) 65 - 99 mg/dL   POCT glucose device results    Collection Time: 06/05/22  9:58 PM   Result Value Ref Range    POC Glucose, Blood 156 (H) 65 - 99 mg/dL   POCT glucose device results    Collection Time: 06/06/22  7:34 AM   Result Value Ref Range    POC Glucose, Blood 130 (H) 65 - 99 mg/dL   POCT glucose device results    Collection Time: 06/06/22 11:10 AM   Result Value Ref Range    POC Glucose, Blood 188 (H) 65 - 99 mg/dL   POCT glucose device results    Collection Time: 06/06/22  5:24 PM   Result Value Ref Range    POC Glucose, Blood  118 (H) 65 - 99 mg/dL   POCT glucose device results    Collection Time: 06/06/22  9:27 PM   Result Value Ref Range    POC Glucose, Blood 144 (H) 65 - 99 mg/dL   POCT glucose device results    Collection Time: 06/07/22  7:21 AM   Result Value Ref Range    POC Glucose, Blood 82 65 - 99 mg/dL       Current Facility-Administered Medications   Medication Frequency   • insulin GLARGINE (Lantus,Semglee) injection Q EVENING   • magnesium sulfate IVPB premix 2 g Once   • phosphorus (K-Phos-Neutral) per tablet 250 mg TID   • insulin regular (HumuLIN R,NovoLIN R) injection 4X/DAY ACHS    And   • dextrose 50% (D50W) injection 25 g Q15 MIN PRN   • [START ON 6/8/2022] amLODIPine (NORVASC) tablet 10 mg Q DAY   • baclofen (LIORESAL) tablet 5 mg TID   • magnesium oxide tablet 400 mg BID   • loperamide (IMODIUM) capsule 2 mg 4X/DAY PRN   • lidocaine (LIDODERM) 5 % 1 Patch Q24HR   • miconazole 2%-zinc oxide (Vinny) topical cream BID   • vitamin D3 (cholecalciferol) tablet 1,000 Units DAILY   • Respiratory Therapy Consult Continuous RT   • Pharmacy Consult Request ...Pain Management Review 1 Each PHARMACY TO DOSE   • hydrALAZINE (APRESOLINE) tablet 25 mg Q8HRS PRN   • acetaminophen (Tylenol) tablet 650 mg Q4HRS PRN   • senna-docusate (PERICOLACE or SENOKOT S) 8.6-50 MG per tablet 2 Tablet BID    And   • polyethylene glycol/lytes (MIRALAX) PACKET 1 Packet QDAY PRN    And   • magnesium hydroxide (MILK OF MAGNESIA) suspension 30 mL QDAY PRN    And   • bisacodyl (DULCOLAX) suppository 10 mg QDAY PRN   • omeprazole (PRILOSEC) capsule 20 mg DAILY   • artificial tears ophthalmic solution 1 Drop PRN   • benzocaine-menthol (Cepacol) lozenge 1 Lozenge Q2HRS PRN   • mag hydrox-al hydrox-simeth (MAALOX PLUS ES or MYLANTA DS) suspension 20 mL Q2HRS PRN   • ondansetron (ZOFRAN ODT) dispertab 4 mg 4X/DAY PRN    Or   • ondansetron (ZOFRAN) syringe/vial injection 4 mg 4X/DAY PRN   • traZODone (DESYREL) tablet 50 mg QHS PRN   • sodium chloride (OCEAN)  0.65 % nasal spray 2 Spray PRN   • oxyCODONE immediate-release (ROXICODONE) tablet 5 mg Q3HRS PRN    Or   • oxyCODONE immediate release (ROXICODONE) tablet 10 mg Q3HRS PRN   • midazolam (VERSED) 5 mg/mL (1 mL vial) PRN   • acetaminophen (Tylenol) tablet 650 mg Q6HRS PRN   • allopurinol (ZYLOPRIM) tablet 100 mg QDAY   • atorvastatin (LIPITOR) tablet 80 mg Q EVENING   • clopidogrel (PLAVIX) tablet 75 mg QDAY   • FLUoxetine (PROZAC) capsule 40 mg DAILY   • gabapentin (NEURONTIN) capsule 100 mg QHS PRN   • losartan (COZAAR) tablet 50 mg BID   • metoprolol SR (TOPROL XL) tablet 100 mg Q EVENING   • tamsulosin (FLOMAX) capsule 0.4 mg DAILY       Orders Placed This Encounter   Procedures   • Diet Order Diet: Cardiac; Second Modifier: (optional): Consistent CHO (Diabetic)     Standing Status:   Standing     Number of Occurrences:   1     Order Specific Question:   Diet:     Answer:   Cardiac [6]     Order Specific Question:   Second Modifier: (optional)     Answer:   Consistent CHO (Diabetic) [4]       Assessment:  Active Hospital Problems    Diagnosis    • *Acute encephalopathy    • Hyponatremia    • Dehydration    • Essential hypertension, benign    • Benign prostatic hyperplasia with urinary obstruction    • Diarrhea    • GERD with esophagitis    • Recurrent UTI    • Hypomagnesemia    • Type 2 diabetes mellitus, with long-term current use of insulin (HCC)    • Stage 3b chronic kidney disease (HCC)        Medical Decision Making and Plan:  Acute toxic encephalopathy - Patient with UTI with urosepsis s/p IV antibiotics. Patient has urinary retention and requires occasional catheterization putting him at risk for recurrent UTIs. 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 On Plavix and statin  -Serial casting with PT/OT.  Will start low  dose Baclofen although most likely contracture     HTN/CAD - Patient on Plavix. Patient on Losartan 50 mg BID, Metoprolol 100 mg XL  -Consult Hospitalist     HLD - Patient on Atorvastatin 80 mg QHS     Leukocytosis - On treatment for UTI. On Ancef with recommendation to switch to Augment. Will check CBC in AM before switch  -Repeat 16.5 up from 16.1, consult Hospitalist     Anemia - Check AM CBC - 12.2      DM2 with hyperglycemia - Patient on Glargine 11 U and SSI  -Consult hospitalist     Depression - Patient on Fluoxetine 40 mg daily      Hx of Gout - Patient on Allopurinol 100 mg daily     Urinary Retention - Patient requiring catheterization ~1 time daily. Follows with Urology. Continue Flomax  -Hematuria on 6/7 with significant amount. Hold Xarelto. Flush bladder.      Vitamin D Deficiency - 29 on admission. Start 1000 U     DVT Ppx - Patient on Xarelto ppx dose. On hold for bleeding.     Total time:  36 minutes.  I spent greater than 50% of the time for patient care, counseling, and coordination on this date, including unit/floor time, and face-to-face time with the patient as per interval events and assessment and plan above. Topics discussed included new hematuria, stop lovenox, start TEDs, bladder flushes, and recheck CBC.     Katheryn Pratt M.D.

## 2022-06-07 NOTE — THERAPY
"Speech Language Pathology  Daily Treatment     Patient Name: Av Bob  Age:  77 y.o., Sex:  male  Medical Record #: [id number]  Today's Date: 6/7/2022     Precautions  Precautions: Fall Risk, Swallow Precautions ( See Comments)  Comments: L eric from previous CVA    Subjective    Pt in bed asleep at the beginning of this session, pt's wife present at pt's bedside.  Pt willing to participate in therapy while in bed.       Objective       06/07/22 1401   SLP Total Time Spent   SLP Individual Total Time Spent (Mins) 30   Treatment Charges   SLP Cognitive Skill Development First 15 Minutes 1   SLP Cognitive Skill Development Additional 15 Minutes 1       Assessment    Pt willing to participate in therapy at bedside; however required encouragement throughout (frequently closing eyes, low motivation).  O-log completed, pt scored a 20/30 (decreased from a 21/30 yesterday).  Pt required extra time for processing and frequent repetitions to respond to questions.  Pt also noted to respond to questions inaccurately seemingly on purpose at times.  For example when asked what tasks he needs to be able to complete physically before going home pt responded with \"I need to get a dog\", when asked to elaborate pt stated \"So I can have a \".  When attempting to discuss how much assistance pt currently requires for transfers pt reported that he is able to get in and out of bed on his own and in order to go home he \"just needs my wife's approval\".          Plan    Continue O-log, memory log, generate therapy goals/tasks pt needs to accomplish before he is able to safely discharge home with his wife       Speech Therapy Problems (Active)       Problem: Memory STGs       Dates: Start: 06/01/22         Goal: STG-Within one week, patient will remember safety precautions related to hospitalization with 75% accuracy.        Dates: Start: 06/01/22         Goal Note filed on 06/01/22 5495 by Harpal Williamson MS,CCC-SLP       Pt " evaluated on 6/1, continue to target                 Problem: Problem Solving STGs       Dates: Start: 06/01/22         Goal: STG-Within one week, patient will complete SCCAN with appropriate goals to follow        Dates: Start: 06/01/22         Goal Note filed on 06/01/22 1448 by Harpal Williamson MS,CCC-SLP       Pt evaluated on 6/1, continue to target              Goal: STG-Within one week, patient will complete o-log with a final score of 25/30 over three consecutive days       Dates: Start: 06/01/22         Goal Note filed on 06/01/22 1448 by Harpal Williamson MS,CCC-SLP       Pt evaluated on 6/1, continue to target                 Problem: Speech/Swallowing LTGs       Dates: Start: 06/01/22         Goal: LTG-By discharge, patient will solve basic problems in order to d/c home with SPV       Dates: Start: 06/01/22

## 2022-06-07 NOTE — PROGRESS NOTES
Received bedside shift report from Birgit DIMAS RN regarding patient and assumed care. Patient awake, calm and stable, currently positioned in bed for comfort and safety; call light within reach. Denies pain or discomfort at this time. Will continue to monitor.

## 2022-06-07 NOTE — THERAPY
Physical Therapy   Daily Treatment     Patient Name: Av Bob  Age:  77 y.o., Sex:  male  Medical Record #: [id number]  Today's Date: 6/7/2022     Precautions  Precautions: (P) Fall Risk, Swallow Precautions ( See Comments)  Comments: (P) L eric from previous CVA    Subjective    Patient in bed with nursing present, attempting to straight cath for urine sample.     Objective       06/07/22 1031   Precautions   Precautions Fall Risk;Swallow Precautions ( See Comments)   Comments L eric from previous CVA   Interdisciplinary Plan of Care Collaboration   IDT Collaboration with  Family / Caregiver;Nursing;Occupational Therapist;Physician   Patient Position at End of Therapy In Bed;Call Light within Reach;Tray Table within Reach;Phone within Reach  (nursing present)   Collaboration Comments med hold, plans for serial casting   Therapy Missed   Missed Therapy (Minutes) 30   Reason For Missed Therapy Medical - Patient on Hold from Therapy  (rosey bleeding from penis)   PT Total Time Spent   PT Individual Total Time Spent (Mins) 30   PT Charge Group   PT Therapeutic Activities 2     Education provided to patient and wife regarding potential plans for serial casting to LUE. Discussed pro's and con's, with patient and wife both in agreement to casting with PT and MD approval. Discussed with Dr. Pratt who is on board with performing casting as planned.    Assessment    Patient tolerated session poorly, unable to participate due to new onset bleeding from penis. Focus of session on education and planning of LUE serial casting.    Strengths: Able to follow instructions, Motivated for self care and independence, Pleasant and cooperative, Supportive family, Willingly participates in therapeutic activities  Barriers: Bladder incontinence, Decreased endurance, Hemiparesis, Impaired activity tolerance, Impaired balance, Limited mobility    Plan    Bed mobility training, transfer training (squat pivot currently),  standing tolerance/balance, Function in Sitting Test, gait training (currently using R wall rail), ROM/stretching, neuro re-ed for L hemibody, encourage anterior weight shifting/leaning, plan for serial casting on Wednesday pending LUE skin integrity.     Passport items to be completed:  Get in/out of bed safely, in/out of a vehicle, safely use mobility device, walk or wheel around home/community, navigate up and down stairs, show how to get up/down from the ground, ensure home is accessible, demonstrate HEP, complete caregiver training    Physical Therapy Problems (Active)       Problem: Balance       Dates: Start: 06/01/22         Goal: STG-Within one week, patient will tolerate Function in Sitting Test assessment.       Dates: Start: 06/01/22               Problem: Mobility       Dates: Start: 06/01/22         Goal: STG-Within one week, patient will propel wheelchair household distances 50 ft with Min A.       Dates: Start: 06/01/22               Problem: Mobility Transfers       Dates: Start: 06/01/22         Goal: STG-Within one week, patient will perform bed mobility with Mod A and bed functions as needed.       Dates: Start: 06/01/22            Goal: STG-Within one week, patient will transfer bed to chair with Max Ax1 via squat/stand pivot.       Dates: Start: 06/01/22               Problem: PT-Long Term Goals       Dates: Start: 06/01/22         Goal: LTG-By discharge, patient will propel wheelchair 50 ft mod I.       Dates: Start: 06/01/22            Goal: LTG-By discharge, patient will ambulate 50 ft with LRAD and Min A.       Dates: Start: 06/01/22            Goal: LTG-By discharge, patient will transfer one surface to another with CGA and LRAD.       Dates: Start: 06/01/22            Goal: LTG-By discharge, patient will transfer in/out of a car with CGA and LRAD.       Dates: Start: 06/01/22            Goal: LTG-By discharge, patient will perform bed mobility independently.       Dates: Start: 06/01/22

## 2022-06-07 NOTE — THERAPY
Physical Therapy   Daily Treatment     Patient Name: Av Bob  Age:  77 y.o., Sex:  male  Medical Record #: [id number]  Today's Date: 6/7/2022     Precautions  Precautions: (P) Fall Risk, Swallow Precautions ( See Comments)  Comments: (P) L eric from previous CVA    Subjective    Patient in bed and agreeable to therapy, wife and nursing in room.     Objective       06/07/22 1431   Precautions   Precautions Fall Risk;Swallow Precautions ( See Comments)   Comments L eric from previous CVA   Bed Mobility    Rolling Moderate Assist to Rt.;Moderate Assist to Lt.  (with bed rails)   Interdisciplinary Plan of Care Collaboration   IDT Collaboration with  Family / Caregiver;Nursing;Occupational Therapist   Patient Position at End of Therapy In Bed;Call Light within Reach;Tray Table within Reach;Phone within Reach;Family / Friend in Room   Collaboration Comments plans for serial casting   PT Total Time Spent   PT Individual Total Time Spent (Mins) 30   PT Charge Group   PT Therapeutic Activities 2     Rolling L/R multiple times for incontinence management.    Supine LUE stretching into neutral finger and wrist position, as well as elbow extension. Rhythmic rotation and contract-relax technique used to increase ROM. Patient able to tolerate ~10-15 minutes of positioning in neutral finger/wrist, lacking ~30 degrees elbow extension.    Assessment    Patient tolerated session fairly, good tolerance to LUE stretching in preparation for serial casting. Patient able to accurately discern light touch and deep pressure at areas to be casted. Educated patient and wife on process, both parties in agreement.    Strengths: Able to follow instructions, Motivated for self care and independence, Pleasant and cooperative, Supportive family, Willingly participates in therapeutic activities  Barriers: Bladder incontinence, Decreased endurance, Hemiparesis, Impaired activity tolerance, Impaired balance, Limited  mobility    Plan    Bed mobility training, transfer training (squat pivot currently), standing tolerance/balance, Function in Sitting Test, gait training (currently using R wall rail), ROM/stretching, neuro re-ed for L hemibody, encourage anterior weight shifting/leaning, plan for serial casting on Wednesday pending scheduling.     Passport items to be completed:  Get in/out of bed safely, in/out of a vehicle, safely use mobility device, walk or wheel around home/community, navigate up and down stairs, show how to get up/down from the ground, ensure home is accessible, demonstrate HEP, complete caregiver training      Physical Therapy Problems (Active)       Problem: Balance       Dates: Start: 06/01/22         Goal: STG-Within one week, patient will tolerate Function in Sitting Test assessment.       Dates: Start: 06/01/22               Problem: Mobility       Dates: Start: 06/01/22         Goal: STG-Within one week, patient will propel wheelchair household distances 50 ft with Min A.       Dates: Start: 06/01/22               Problem: Mobility Transfers       Dates: Start: 06/01/22         Goal: STG-Within one week, patient will perform bed mobility with Mod A and bed functions as needed.       Dates: Start: 06/01/22            Goal: STG-Within one week, patient will transfer bed to chair with Max Ax1 via squat/stand pivot.       Dates: Start: 06/01/22               Problem: PT-Long Term Goals       Dates: Start: 06/01/22         Goal: LTG-By discharge, patient will propel wheelchair 50 ft mod I.       Dates: Start: 06/01/22            Goal: LTG-By discharge, patient will ambulate 50 ft with LRAD and Min A.       Dates: Start: 06/01/22            Goal: LTG-By discharge, patient will transfer one surface to another with CGA and LRAD.       Dates: Start: 06/01/22            Goal: LTG-By discharge, patient will transfer in/out of a car with CGA and LRAD.       Dates: Start: 06/01/22            Goal: LTG-By discharge,  patient will perform bed mobility independently.       Dates: Start: 06/01/22

## 2022-06-07 NOTE — PROGRESS NOTES
Patient care assumed. Report received from [facility] IVAN Carter.  Patient is alert and calm, resting in bed. Call light and bedside table within reach. Will continue to monitor.

## 2022-06-07 NOTE — PROGRESS NOTES
Patient was getting out of bed with therapy this am, incontinent to urine with blood, unable to determine amount in diaper, pinkish in color, no obvious clots, denies abdominal pain or urethral pain. Charge Nurse Julia came down to assess and Dr Malcolm was present, will continue to monitor.

## 2022-06-07 NOTE — CARE PLAN
The patient is Watcher - Medium risk of patient condition declining or worsening    Shift Goals  Clinical Goals: safety  Patient Goals: safety    Problem: Bladder / Voiding  Goal: Patient will establish and maintain regular urinary output  Outcome: Not Met patient with bloody incontinent urine this am, bladder irrigation started this afternoon, color is now pinkish in color.   Xarelto was stopped at this time.  Problem: Bowel Elimination  Goal: Patient will participate in bowel management program  Outcome: Not Met multiple large loose incontinent episodes of bowel. Esme was dc'd     Problem: Skin Integrity  Goal: Patient's skin integrity will be maintained or improve  Outcome: Not Met patient has moisture associated incontinence to bowel and bladder, documented in LDA and frequently rounded on to make sure patient is dry.

## 2022-06-07 NOTE — PROGRESS NOTES
Held Cozaar and Metoprolol per parameters /69 and heart rate of 61 tonight.  No signs of hypotension.  Will continue to monitor patient.

## 2022-06-08 ENCOUNTER — APPOINTMENT (OUTPATIENT)
Dept: RADIOLOGY | Facility: REHABILITATION | Age: 77
DRG: 092 | End: 2022-06-08
Attending: HOSPITALIST
Payer: MEDICARE

## 2022-06-08 ENCOUNTER — APPOINTMENT (OUTPATIENT)
Dept: RADIOLOGY | Facility: MEDICAL CENTER | Age: 77
End: 2022-06-08
Attending: HOSPITALIST
Payer: COMMERCIAL

## 2022-06-08 PROBLEM — R74.8 ALKALINE PHOSPHATASE ELEVATION: Status: ACTIVE | Noted: 2022-06-08

## 2022-06-08 PROBLEM — D75.839 THROMBOCYTOSIS: Status: ACTIVE | Noted: 2022-06-08

## 2022-06-08 LAB
ALBUMIN SERPL BCP-MCNC: 2.6 G/DL (ref 3.2–4.9)
ALBUMIN/GLOB SERPL: 1 G/DL
ALP SERPL-CCNC: 192 U/L (ref 30–99)
ALT SERPL-CCNC: 14 U/L (ref 2–50)
ANION GAP SERPL CALC-SCNC: 10 MMOL/L (ref 7–16)
APPEARANCE UR: ABNORMAL
AST SERPL-CCNC: 40 U/L (ref 12–45)
BACTERIA #/AREA URNS HPF: NEGATIVE /HPF
BILIRUB SERPL-MCNC: 0.3 MG/DL (ref 0.1–1.5)
BILIRUB UR QL STRIP.AUTO: NEGATIVE
BUN SERPL-MCNC: 29 MG/DL (ref 8–22)
C DIFF DNA SPEC QL NAA+PROBE: NEGATIVE
C DIFF TOX GENS STL QL NAA+PROBE: POSITIVE
CALCIUM SERPL-MCNC: 8.2 MG/DL (ref 8.5–10.5)
CHLORIDE SERPL-SCNC: 107 MMOL/L (ref 96–112)
CO2 SERPL-SCNC: 23 MMOL/L (ref 20–33)
COLOR UR: ABNORMAL
CREAT SERPL-MCNC: 1.13 MG/DL (ref 0.5–1.4)
EPI CELLS #/AREA URNS HPF: NEGATIVE /HPF
ERYTHROCYTE [DISTWIDTH] IN BLOOD BY AUTOMATED COUNT: 45.3 FL (ref 35.9–50)
GFR SERPLBLD CREATININE-BSD FMLA CKD-EPI: 68 ML/MIN/1.73 M 2
GLOBULIN SER CALC-MCNC: 2.5 G/DL (ref 1.9–3.5)
GLUCOSE BLD STRIP.AUTO-MCNC: 110 MG/DL (ref 65–99)
GLUCOSE BLD STRIP.AUTO-MCNC: 133 MG/DL (ref 65–99)
GLUCOSE BLD STRIP.AUTO-MCNC: 87 MG/DL (ref 65–99)
GLUCOSE BLD STRIP.AUTO-MCNC: 89 MG/DL (ref 65–99)
GLUCOSE SERPL-MCNC: 117 MG/DL (ref 65–99)
GLUCOSE UR STRIP.AUTO-MCNC: NEGATIVE MG/DL
HCT VFR BLD AUTO: 34.1 % (ref 42–52)
HGB BLD-MCNC: 11.3 G/DL (ref 14–18)
IRON SATN MFR SERPL: 13 % (ref 15–55)
IRON SERPL-MCNC: 20 UG/DL (ref 50–180)
KETONES UR STRIP.AUTO-MCNC: NEGATIVE MG/DL
LEUKOCYTE ESTERASE UR QL STRIP.AUTO: ABNORMAL
MAGNESIUM SERPL-MCNC: 1.4 MG/DL (ref 1.5–2.5)
MCH RBC QN AUTO: 28.9 PG (ref 27–33)
MCHC RBC AUTO-ENTMCNC: 33.1 G/DL (ref 33.7–35.3)
MCV RBC AUTO: 87.2 FL (ref 81.4–97.8)
MICRO URNS: ABNORMAL
NITRITE UR QL STRIP.AUTO: NEGATIVE
PH UR STRIP.AUTO: 5 [PH] (ref 5–8)
PHOSPHATE SERPL-MCNC: 3.2 MG/DL (ref 2.5–4.5)
PLATELET # BLD AUTO: 475 K/UL (ref 164–446)
PMV BLD AUTO: 9.6 FL (ref 9–12.9)
POTASSIUM SERPL-SCNC: 4.2 MMOL/L (ref 3.6–5.5)
PROT SERPL-MCNC: 5.1 G/DL (ref 6–8.2)
PROT UR QL STRIP: 30 MG/DL
RBC # BLD AUTO: 3.91 M/UL (ref 4.7–6.1)
RBC # URNS HPF: ABNORMAL /HPF
RBC UR QL AUTO: ABNORMAL
SODIUM SERPL-SCNC: 140 MMOL/L (ref 135–145)
SP GR UR STRIP.AUTO: 1.02
TIBC SERPL-MCNC: 155 UG/DL (ref 250–450)
UIBC SERPL-MCNC: 135 UG/DL (ref 110–370)
UROBILINOGEN UR STRIP.AUTO-MCNC: 0.2 MG/DL
WBC # BLD AUTO: 17.9 K/UL (ref 4.8–10.8)
WBC #/AREA URNS HPF: ABNORMAL /HPF

## 2022-06-08 PROCEDURE — 700111 HCHG RX REV CODE 636 W/ 250 OVERRIDE (IP): Performed by: HOSPITALIST

## 2022-06-08 PROCEDURE — 770010 HCHG ROOM/CARE - REHAB SEMI PRIVAT*

## 2022-06-08 PROCEDURE — 99233 SBSQ HOSP IP/OBS HIGH 50: CPT | Performed by: HOSPITALIST

## 2022-06-08 PROCEDURE — 87324 CLOSTRIDIUM AG IA: CPT

## 2022-06-08 PROCEDURE — 700102 HCHG RX REV CODE 250 W/ 637 OVERRIDE(OP): Performed by: PHYSICAL MEDICINE & REHABILITATION

## 2022-06-08 PROCEDURE — 99233 SBSQ HOSP IP/OBS HIGH 50: CPT | Performed by: PHYSICAL MEDICINE & REHABILITATION

## 2022-06-08 PROCEDURE — 82962 GLUCOSE BLOOD TEST: CPT | Mod: 91

## 2022-06-08 PROCEDURE — 97535 SELF CARE MNGMENT TRAINING: CPT

## 2022-06-08 PROCEDURE — 83540 ASSAY OF IRON: CPT

## 2022-06-08 PROCEDURE — 84100 ASSAY OF PHOSPHORUS: CPT

## 2022-06-08 PROCEDURE — 87899 AGENT NOS ASSAY W/OPTIC: CPT

## 2022-06-08 PROCEDURE — 80053 COMPREHEN METABOLIC PANEL: CPT

## 2022-06-08 PROCEDURE — 97530 THERAPEUTIC ACTIVITIES: CPT

## 2022-06-08 PROCEDURE — G1004 CDSM NDSC: HCPCS

## 2022-06-08 PROCEDURE — A9270 NON-COVERED ITEM OR SERVICE: HCPCS | Performed by: PHYSICAL MEDICINE & REHABILITATION

## 2022-06-08 PROCEDURE — 74018 RADEX ABDOMEN 1 VIEW: CPT

## 2022-06-08 PROCEDURE — 83735 ASSAY OF MAGNESIUM: CPT

## 2022-06-08 PROCEDURE — 85027 COMPLETE CBC AUTOMATED: CPT

## 2022-06-08 PROCEDURE — 81001 URINALYSIS AUTO W/SCOPE: CPT

## 2022-06-08 PROCEDURE — 71045 X-RAY EXAM CHEST 1 VIEW: CPT

## 2022-06-08 PROCEDURE — 36415 COLL VENOUS BLD VENIPUNCTURE: CPT

## 2022-06-08 PROCEDURE — 87040 BLOOD CULTURE FOR BACTERIA: CPT | Mod: 91

## 2022-06-08 PROCEDURE — A9270 NON-COVERED ITEM OR SERVICE: HCPCS | Performed by: HOSPITALIST

## 2022-06-08 PROCEDURE — 700102 HCHG RX REV CODE 250 W/ 637 OVERRIDE(OP): Performed by: HOSPITALIST

## 2022-06-08 PROCEDURE — 700101 HCHG RX REV CODE 250: Performed by: PHYSICAL MEDICINE & REHABILITATION

## 2022-06-08 PROCEDURE — 700105 HCHG RX REV CODE 258: Performed by: HOSPITALIST

## 2022-06-08 PROCEDURE — 87086 URINE CULTURE/COLONY COUNT: CPT

## 2022-06-08 PROCEDURE — 87493 C DIFF AMPLIFIED PROBE: CPT

## 2022-06-08 PROCEDURE — 83550 IRON BINDING TEST: CPT

## 2022-06-08 RX ORDER — MAGNESIUM SULFATE HEPTAHYDRATE 40 MG/ML
2 INJECTION, SOLUTION INTRAVENOUS ONCE
Status: COMPLETED | OUTPATIENT
Start: 2022-06-08 | End: 2022-06-08

## 2022-06-08 RX ORDER — MICONAZOLE NITRATE 20 MG/G
CREAM TOPICAL EVERY 6 HOURS PRN
Status: ACTIVE | OUTPATIENT
Start: 2022-06-08 | End: 2022-06-18

## 2022-06-08 RX ORDER — METRONIDAZOLE 500 MG/1
500 TABLET ORAL EVERY 8 HOURS
Status: DISCONTINUED | OUTPATIENT
Start: 2022-06-08 | End: 2022-06-09

## 2022-06-08 RX ADMIN — OMEPRAZOLE 20 MG: 20 CAPSULE, DELAYED RELEASE ORAL at 08:48

## 2022-06-08 RX ADMIN — AMLODIPINE BESYLATE 10 MG: 5 TABLET ORAL at 05:33

## 2022-06-08 RX ADMIN — LIDOCAINE 1 PATCH: 50 PATCH TOPICAL at 13:44

## 2022-06-08 RX ADMIN — MAGNESIUM SULFATE HEPTAHYDRATE 2 G: 2 INJECTION, SOLUTION INTRAVENOUS at 17:50

## 2022-06-08 RX ADMIN — CEFTRIAXONE SODIUM 1 G: 1 INJECTION, POWDER, FOR SOLUTION INTRAMUSCULAR; INTRAVENOUS at 16:16

## 2022-06-08 RX ADMIN — TAMSULOSIN HYDROCHLORIDE 0.4 MG: 0.4 CAPSULE ORAL at 08:48

## 2022-06-08 RX ADMIN — BACLOFEN 5 MG: 10 TABLET ORAL at 08:48

## 2022-06-08 RX ADMIN — OXYCODONE 5 MG: 5 TABLET ORAL at 17:49

## 2022-06-08 RX ADMIN — DIBASIC SODIUM PHOSPHATE, MONOBASIC POTASSIUM PHOSPHATE AND MONOBASIC SODIUM PHOSPHATE 250 MG: 852; 155; 130 TABLET ORAL at 21:15

## 2022-06-08 RX ADMIN — METRONIDAZOLE 500 MG: 500 TABLET ORAL at 16:15

## 2022-06-08 RX ADMIN — BACLOFEN 5 MG: 10 TABLET ORAL at 16:15

## 2022-06-08 RX ADMIN — ATORVASTATIN CALCIUM 80 MG: 40 TABLET, FILM COATED ORAL at 21:17

## 2022-06-08 RX ADMIN — FLUOXETINE 40 MG: 20 CAPSULE ORAL at 08:48

## 2022-06-08 RX ADMIN — DIBASIC SODIUM PHOSPHATE, MONOBASIC POTASSIUM PHOSPHATE AND MONOBASIC SODIUM PHOSPHATE 250 MG: 852; 155; 130 TABLET ORAL at 16:15

## 2022-06-08 RX ADMIN — CLOPIDOGREL 75 MG: 75 TABLET, FILM COATED ORAL at 05:33

## 2022-06-08 RX ADMIN — DIBASIC SODIUM PHOSPHATE, MONOBASIC POTASSIUM PHOSPHATE AND MONOBASIC SODIUM PHOSPHATE 250 MG: 852; 155; 130 TABLET ORAL at 08:48

## 2022-06-08 RX ADMIN — MAGNESIUM OXIDE TAB 400 MG (241.3 MG ELEMENTAL MG) 400 MG: 400 (241.3 MG) TAB at 08:48

## 2022-06-08 RX ADMIN — Medication 1000 UNITS: at 08:48

## 2022-06-08 RX ADMIN — METRONIDAZOLE 500 MG: 500 TABLET ORAL at 21:31

## 2022-06-08 RX ADMIN — OXYCODONE 5 MG: 5 TABLET ORAL at 08:48

## 2022-06-08 RX ADMIN — BACLOFEN 5 MG: 10 TABLET ORAL at 21:17

## 2022-06-08 RX ADMIN — TRAZODONE HYDROCHLORIDE 50 MG: 50 TABLET ORAL at 21:15

## 2022-06-08 RX ADMIN — OXYCODONE 5 MG: 5 TABLET ORAL at 13:42

## 2022-06-08 RX ADMIN — ACETAMINOPHEN 650 MG: 325 TABLET ORAL at 08:48

## 2022-06-08 RX ADMIN — ALLOPURINOL 100 MG: 100 TABLET ORAL at 05:33

## 2022-06-08 RX ADMIN — OXYCODONE 5 MG: 5 TABLET ORAL at 05:34

## 2022-06-08 ASSESSMENT — ENCOUNTER SYMPTOMS
COUGH: 0
SHORTNESS OF BREATH: 0
DIARRHEA: 1
VOMITING: 0
NAUSEA: 0
CHILLS: 0
ABDOMINAL PAIN: 0
MUSCULOSKELETAL NEGATIVE: 1
PALPITATIONS: 0
POLYDIPSIA: 0
BRUISES/BLEEDS EASILY: 0
EYES NEGATIVE: 1
FEVER: 0
MEMORY LOSS: 1

## 2022-06-08 ASSESSMENT — PAIN SCALES - WONG BAKER
WONGBAKER_NUMERICALRESPONSE: HURTS JUST A LITTLE BIT

## 2022-06-08 ASSESSMENT — PAIN DESCRIPTION - PAIN TYPE: TYPE: ACUTE PAIN

## 2022-06-08 NOTE — CARE PLAN
"  Problem: Knowledge Deficit - Standard  Goal: Patient and family/care givers will demonstrate understanding of plan of care, disease process/condition, diagnostic tests and medications  Outcome: Progressing  Note: Pt agrees with plan of care tonight regarding medications and safety.  Will continue to monitor patient.      Problem: Fall Risk - Rehab  Goal: Patient will remain free from falls  Outcome: Progressing  Note: Camilla Gruber Fall risk Assessment Score:  22    High fall risk Interventions   - Alarming seatbelt  - Wander guard  - Bed and strip alarm   - Yellow sign by the door   - Yellow wrist band \"Fall risk\"  - Room near to the nurse station  - Do not leave patient unattended in the bathroom  - Fall risk education provided         The patient is Stable - Low risk of patient condition declining or worsening    Shift Goals  Clinical Goals: Safety  Patient Goals: Safety    Progress made toward(s) clinical / shift goals:  progressing          "

## 2022-06-08 NOTE — PROGRESS NOTES
Hospital Medicine Daily Progress Note      Chief Complaint:  Hypertension  Diabetes  Leukocytosis    Interval History:  Pt denies complaints.  Wife at bedside reports watery stool.  Bloody urine cleared up overnight w/ CBI.  Labs reviewed and discussed.    Review of Systems  Review of Systems   Constitutional: Negative for chills and fever.   HENT: Negative.    Eyes: Negative.    Respiratory: Negative for cough and shortness of breath.    Cardiovascular: Negative for chest pain and palpitations.   Gastrointestinal: Positive for diarrhea. Negative for abdominal pain, nausea and vomiting.   Genitourinary:        +incontinent   Musculoskeletal: Negative.    Skin: Negative for itching and rash.   Endo/Heme/Allergies: Negative for polydipsia. Does not bruise/bleed easily.   Psychiatric/Behavioral: Positive for memory loss.        Physical Exam  Temp:  [36.7 °C (98 °F)-36.8 °C (98.2 °F)] 36.8 °C (98.2 °F)  Pulse:  [61-68] 61  Resp:  [18] 18  BP: (108-144)/(55-84) 108/58  SpO2:  [91 %-94 %] 94 %    Physical Exam  Vitals reviewed.   Constitutional:       General: He is not in acute distress.     Appearance: He is not ill-appearing, toxic-appearing or diaphoretic.   HENT:      Head: Normocephalic and atraumatic.      Right Ear: External ear normal.      Left Ear: External ear normal.      Nose: Nose normal.      Mouth/Throat:      Pharynx: Oropharynx is clear.   Eyes:      General:         Right eye: No discharge.         Left eye: No discharge.      Extraocular Movements: Extraocular movements intact.      Conjunctiva/sclera: Conjunctivae normal.   Cardiovascular:      Rate and Rhythm: Normal rate and regular rhythm.   Pulmonary:      Effort: Pulmonary effort is normal. No respiratory distress.      Breath sounds: Normal breath sounds. No wheezing.   Abdominal:      General: Bowel sounds are normal. There is no distension.      Tenderness: There is no abdominal tenderness. There is no guarding or rebound.   Musculoskeletal:       Cervical back: Normal range of motion and neck supple.      Right lower leg: No edema.      Left lower leg: No edema.   Skin:     General: Skin is warm and dry.   Neurological:      Mental Status: He is disoriented.         Fluids    Intake/Output Summary (Last 24 hours) at 6/8/2022 1240  Last data filed at 6/8/2022 1100  Gross per 24 hour   Intake 720 ml   Output 800 ml   Net -80 ml       Laboratory  Recent Labs     06/08/22  0558   WBC 17.9*   RBC 3.91*   HEMOGLOBIN 11.3*   HEMATOCRIT 34.1*   MCV 87.2   MCH 28.9   MCHC 33.1*   RDW 45.3   PLATELETCT 475*   MPV 9.6     Recent Labs     06/08/22  0558   SODIUM 140   POTASSIUM 4.2   CHLORIDE 107   CO2 23   GLUCOSE 117*   BUN 29*   CREATININE 1.13   CALCIUM 8.2*               Assessment/Plan  Alkaline phosphatase elevation  Assessment & Plan  Request U/S Abd  Follow LFT's    Thrombocytosis  Assessment & Plan  Likely reactive  Follow Platelet counts    Gout  Assessment & Plan  On Allopurinol    Hypophosphatemia  Assessment & Plan  Phosphorus levels corrected  Will discontinue Neutra-Phos soon    Hypokalemia  Assessment & Plan  K+ repleted  Also replacing Mg++  Outpt meds include K+ supplement    Leukocytosis  Assessment & Plan  Request pancultures, C Dif, and KUB  Follow labs and microbiology    Vitamin D insufficiency  Assessment & Plan  Vit D level 29  On supplementation    Azotemia  Assessment & Plan  Encourage PO fluids  Follow renal function    Essential hypertension, benign- (present on admission)  Assessment & Plan  On Norvasc, Losartan, and Toprol  Observe blood pressure trends  Monitor for orthostatic hypotension on Flomax    Benign prostatic hyperplasia with urinary obstruction- (present on admission)  Assessment & Plan  S/P TURP in March 2022 w/ Dr. Barry  Gross hematuria resolved w/ CBI overnight  Pt's wife reports that Urology told her to expect intermittent hematuria for months following TURP   F/U    Normocytic anemia- (present on  admission)  Assessment & Plan  Fe 20  May start supplements if no confounding factors  Follow H/H    GERD with esophagitis- (present on admission)  Assessment & Plan  On Prilosec    Recurrent UTI- (present on admission)  Assessment & Plan  Pt self-caths as outpt and has h/o recurrent UTI  UCx 5/24/22 >100,000 Staph Aureus, completed Ancef 6/5/22  UCx 6/1/22 ,000 Candida, completed Fluconazole 6/6/22    Depression  Assessment & Plan  On Prozac    Hypomagnesemia- (present on admission)  Assessment & Plan  Discontinue MgOx for diarrhea  Continue to replete w/ MgSO4  Outpt meds include Mg++ supplement    H/O Cerebrovascular accident (CVA) in adulthood- (present on admission)  Assessment & Plan  On Plavix and Lipitor    Type 2 diabetes mellitus, with long-term current use of insulin (HCC)- (present on admission)  Assessment & Plan  HbA1c 6.6  Observe serum glucose trends on Glargine and SSI  Outpt meds include Trulicity 3 mg weekly, Toujeo 5-12 units daily, and Humalog 5 units base and per SS    Full Code    Discussed w/ pt, wife, bedside RN (Fina), and Charge RN (Jessica)

## 2022-06-08 NOTE — PROGRESS NOTES
Received bedside shift report from Fina MEREDITH RN regarding patient and assumed care. Patient awake, calm and stable, currently positioned in bed for comfort and safety; call light within reach. Denies pain or discomfort at this time. Will continue to monitor.

## 2022-06-08 NOTE — THERAPY
Missed Therapy     Patient Name: Av Bob  Age:  77 y.o., Sex:  male  Medical Record #: [id number]  Today's Date: 6/8/2022    Discussed missed therapy with Dr Pratt.     Pt in bed moaning in pain, reported 10/10 pain in buttocks and was not able to participate in ST session.  Nursing made aware and medications for pain were given.       06/08/22 1331   Therapy Missed   Missed Therapy (Minutes) 30   Reason For Missed Therapy Medical - Patient on Hold from Therapy

## 2022-06-08 NOTE — ASSESSMENT & PLAN NOTE
S/P TURP in March 2022 w/ Dr. Barry  Gross hematuria resolved w/ CBI x 24 hours on 6/8/22  Pt's wife reports that Urology told her to expect intermittent hematuria for months following TURP  But suspect bleeding may have been from left kidney hemorrhage (see CT Abd/Pelvis 6/8/22)  Needs  F/U

## 2022-06-08 NOTE — PROGRESS NOTES
Patient care assumed. Report received from [facility] IVAN Carter. Patient is alert and calm, resting in bed. Call light and bedside table within reach. Will continue to monitor.

## 2022-06-08 NOTE — THERAPY
Physical Therapy   Daily Treatment     Patient Name: Av Bob  Age:  77 y.o., Sex:  male  Medical Record #: [id number]  Today's Date: 6/8/2022     Precautions  Precautions: Fall Risk, Swallow Precautions ( See Comments)  Comments: L eric from previous CVA; cdiff rule out, poor OOB tolerance, wounds on buttocks, serial cast to LUE 6/8/22    Subjective    Pt found in bed, moaning in pain, RN aware and attempting to keep pt clean, dry and then medicated following hygiene.     Objective       06/08/22 0831   Transfer Functional Level of Assist   Bed, Chair, Wheelchair Transfer Refused  (d/t pain)   Bed Mobility    Supine to Sit Maximal Assist   Sit to Supine Maximal Assist   Rolling Maximal Assist to Rt.;Maximum Assist to Lt.   Neuro-Muscular Treatments   Neuro-Muscular Treatments Tactile Cuing;Verbal Cuing   Interdisciplinary Plan of Care Collaboration   IDT Collaboration with  Nursing   Patient Position at End of Therapy In Bed;Call Light within Reach;Tray Table within Reach   Collaboration Comments re: assisted RN with hygiene   PT Total Time Spent   PT Individual Total Time Spent (Mins) 30   PT Charge Group   PT Therapeutic Activities 2     Pt sat sup>sit maxA x1.    Static sitting EOB unable to achieve midline with max cues and maxA.  Side sitting on R elbow EOB able to sustain x 8'.  Pt reporting pain in all areas asked: L/R LE's, back    Assessment    Limited by pain and constant watery bowel incontinence.     Strengths: Able to follow instructions, Motivated for self care and independence, Pleasant and cooperative, Supportive family, Willingly participates in therapeutic activities  Barriers: Bladder incontinence, Decreased endurance, Hemiparesis, Impaired activity tolerance, Impaired balance, Limited mobility    Plan    Bed mobility training, transfer training (squat pivot currently), standing tolerance/balance, Function in Sitting Test, gait training (currently using R wall rail), ROM/stretching,  neuro re-ed for L hemibody, encourage anterior weight shifting/leaning, plan for serial casting on Wednesday pending scheduling.     Passport items to be completed:  Get in/out of bed safely, in/out of a vehicle, safely use mobility device, walk or wheel around home/community, navigate up and down stairs, show how to get up/down from the ground, ensure home is accessible, demonstrate HEP, complete caregiver training    Physical Therapy Problems (Active)       Problem: Balance       Dates: Start: 06/01/22         Goal: STG-Within one week, patient will tolerate Function in Sitting Test assessment.       Dates: Start: 06/01/22               Problem: Mobility       Dates: Start: 06/01/22         Goal: STG-Within one week, patient will propel wheelchair household distances 50 ft with Min A.       Dates: Start: 06/01/22               Problem: Mobility Transfers       Dates: Start: 06/01/22         Goal: STG-Within one week, patient will perform bed mobility with Mod A and bed functions as needed.       Dates: Start: 06/01/22            Goal: STG-Within one week, patient will transfer bed to chair with Max Ax1 via squat/stand pivot.       Dates: Start: 06/01/22               Problem: PT-Long Term Goals       Dates: Start: 06/01/22         Goal: LTG-By discharge, patient will propel wheelchair 50 ft mod I.       Dates: Start: 06/01/22            Goal: LTG-By discharge, patient will ambulate 50 ft with LRAD and Min A.       Dates: Start: 06/01/22            Goal: LTG-By discharge, patient will transfer one surface to another with CGA and LRAD.       Dates: Start: 06/01/22            Goal: LTG-By discharge, patient will transfer in/out of a car with CGA and LRAD.       Dates: Start: 06/01/22            Goal: LTG-By discharge, patient will perform bed mobility independently.       Dates: Start: 06/01/22

## 2022-06-08 NOTE — THERAPY
Occupational Therapy  Daily Treatment     Patient Name: Av Bob  Age:  77 y.o., Sex:  male  Medical Record #: [id number]  Today's Date: 6/8/2022     Precautions  Precautions: Fall Risk, Swallow Precautions ( See Comments)  Comments: L eric from previous CVA; cdiff rule out, poor OOB tolerance, wounds on buttocks, serial cast to LUE 6/8/22    Safety   ADL Safety : Requires Physical Assist for Safety (2 person)    Subjective    Pt resting in bed, spouse present. Pt was cleared for therapy from bed level by MD, tx team and nursing. PT plans to serial cast pt's LUE following OT session.     Objective       06/08/22 0931   Precautions   Precautions Fall Risk;Swallow Precautions ( See Comments)   Comments L eric from previous CVA; cdiff rule out, poor OOB tolerance, wounds on buttocks, serial cast to LUE 6/8/22   Functional Level of Assist   Upper Body Dressing Maximal Assist   Upper Body Dressing Description Assit with threading arms through sleeves;Assist with pulling shirt over head;Increased time;Set-up of equipment;Supervision for safety  (don pullover shirt in supine with mod A to roll, A to thread LUE, pull overhead and down in back)   Interdisciplinary Plan of Care Collaboration   IDT Collaboration with  Family / Caregiver;Nursing;Physician   Patient Position at End of Therapy In Bed;Call Light within Reach;Tray Table within Reach;Phone within Reach;Family / Friend in Room   Collaboration Comments CLOF,POC, plan for serial casting   OT Total Time Spent   OT Individual Total Time Spent (Mins) 60   OT Charge Group   OT Self Care / ADL 1   OT Therapy Activity 3     Supine LUE ROM/stretching/tone mgt completed in prep for LUE serial casting:    ROM measurements taken in resting position:  140 degrees elbow flexion  70 degrees wrist flexion with supination  flexed finger position    Initial stretch:  -70 degrees elbow extension    ROM into neutral finger and wrist position, and elbow extension. Application  of heat pack, rhythmic rotation and contract-relax technique used to increase ROM.     Final stretch prior to casting:   -20 degrees elbow extension  2 degrees wrist extension  Neutral finger extension    Assessment    Pt tolerated session well despite medical complications. Pt responded well to heat pack and gentle ROM/stretching to achieve neutral wrist/finger and ~20 degrees of elbow extension. Pt and spouse in agreement to proceed with serial casting during PT session.   Strengths: Motivated for self care and independence, Pleasant and cooperative, Supportive family, Willingly participates in therapeutic activities  Barriers: Confused, Decreased endurance, Fatigue, Generalized weakness, Impaired activity tolerance, Impaired balance, Impaired functional cognition, Limited mobility    Plan    Monitor LUE (skin/circulation, comfort) following serial casting (applied on 6/8).  Pressure sore prevention/mgmt, Attention to task, ADLs, rolling, 1 step directions, anterior weight shift L and R for progression of functional transfers, seated balance, STS,     Passport items to be completed:  Perform bathroom transfers, complete dressing, complete feeding, get ready for the day, prepare a simple meal, participate in household tasks, adapt home for safety needs, demonstrate home exercise program, complete caregiver training     Occupational Therapy Goals (Active)       Problem: Bathing       Dates: Start: 06/01/22         Goal: STG-Within one week, patient will bathe w/ max A using DME as needed.        Dates: Start: 06/01/22               Problem: Dressing       Dates: Start: 06/01/22         Goal: STG-Within one week, patient will dress UB w/ mod A.       Dates: Start: 06/01/22            Goal: STG-Within one week, patient will dress LB w/ mod A.        Dates: Start: 06/01/22               Problem: Functional Transfers       Dates: Start: 06/01/22         Goal: STG-Within one week, patient will transfer to toilet w/ max A  using DME as needed.       Dates: Start: 06/01/22            Goal: STG-Within one week, patient will transfer to step in shower with max A using DME as needed.       Dates: Start: 06/01/22               Problem: OT Long Term Goals       Dates: Start: 06/01/22         Goal: LTG-By discharge, patient will complete basic self care tasks with min A using DME and AE as needed.       Dates: Start: 06/01/22            Goal: LTG-By discharge, patient will perform bathroom transfers w/ min A using DME and AE as needed.       Dates: Start: 06/01/22               Problem: Toileting       Dates: Start: 06/01/22         Goal: STG-Within one week, patient will complete toileting tasks w/ max A using DME as needed.        Dates: Start: 06/01/22

## 2022-06-08 NOTE — THERAPY
Physical Therapy   Daily Treatment     Patient Name: Av Bob  Age:  77 y.o., Sex:  male  Medical Record #: [id number]  Today's Date: 2022     Precautions  Precautions: (P) Fall Risk, Swallow Precautions ( See Comments)  Comments: (P) L eric from previous CVA; cdiff rule out, poor OOB tolerance, wounds on buttocks, serial cast to LUE 22    Subjective    Patient in bed, nursing and wife present, painful coccyx however agreeable to serial casting. Despite medical changes, MD, treatment team, and patient/family approve serial casting to LUE.     Objective       22 1031   Precautions   Precautions Fall Risk;Swallow Precautions ( See Comments)   Comments L eric from previous CVA; cdiff rule out, poor OOB tolerance, wounds on buttocks, serial cast to LUE 22   Pain 0 - 10 Group   Location Coccyx   Location Orientation Posterior   Interdisciplinary Plan of Care Collaboration   IDT Collaboration with  Family / Caregiver;Nursing;Occupational Therapist;Physician   Patient Position at End of Therapy In Bed;Call Light within Reach;Tray Table within Reach;Phone within Reach;Family / Friend in Room   Collaboration Comments education on serial casting   PT Total Time Spent   PT Individual Total Time Spent (Mins) 60   PT Charge Group   PT Therapeutic Activities 4     LUE skin examined and photos taken (see below). Patient reporting no adverse sensations on LUE, no new changes in skin integrity.                         ROM, stretching, and tone management performed by OT with LUE ROM as follows:   Resting position: 140 degrees elbow flexion      70 degrees wrist flexion with supination      flexed finger position     Initial stretch:  -70 degrees elbow extension     Final stretch prior to -20 degrees elbow extension   castin degrees wrist extension      Neutral finger extension    Patient underwent initial casting to LUE for ROM/spasticity management, padding placed for positioning and protecting  bony prominences. See photos below. Patient cast into neutral wrist position, and -60 degrees elbow extension                                       Patient and wife educated on serial casting management, signs to look out for, and monitoring of skin/circulation underneath cast. Handout provided and posted onto patient's wall.    Assessment    Patient tolerated session well despite medical complications. Good tolerance to serial casting with no discomfort during process as well as after. Encouraged patient and wife to notify staff if concerns arise, verbalized understanding of education. Continues with poor OOB tolerance.    Strengths: Able to follow instructions, Motivated for self care and independence, Pleasant and cooperative, Supportive family, Willingly participates in therapeutic activities  Barriers: Bladder incontinence, Decreased endurance, Hemiparesis, Impaired activity tolerance, Impaired balance, Limited mobility    Plan    Bed mobility training, transfer training (squat pivot currently), standing tolerance/balance, Function in Sitting Test, gait training (currently using R wall rail), ROM/stretching, neuro re-ed for L hemibody, encourage anterior weight shifting/leaning, serial casting to LUE applied on 6/8/22 (monitor skin/circulation, patient comfort).     Passport items to be completed:  Get in/out of bed safely, in/out of a vehicle, safely use mobility device, walk or wheel around home/community, navigate up and down stairs, show how to get up/down from the ground, ensure home is accessible, demonstrate HEP, complete caregiver training    Physical Therapy Problems (Active)       Problem: Balance       Dates: Start: 06/01/22         Goal: STG-Within one week, patient will tolerate Function in Sitting Test assessment.       Dates: Start: 06/01/22               Problem: Mobility       Dates: Start: 06/01/22         Goal: STG-Within one week, patient will propel wheelchair household distances 50 ft with  Min A.       Dates: Start: 06/01/22               Problem: Mobility Transfers       Dates: Start: 06/01/22         Goal: STG-Within one week, patient will perform bed mobility with Mod A and bed functions as needed.       Dates: Start: 06/01/22            Goal: STG-Within one week, patient will transfer bed to chair with Max Ax1 via squat/stand pivot.       Dates: Start: 06/01/22               Problem: PT-Long Term Goals       Dates: Start: 06/01/22         Goal: LTG-By discharge, patient will propel wheelchair 50 ft mod I.       Dates: Start: 06/01/22            Goal: LTG-By discharge, patient will ambulate 50 ft with LRAD and Min A.       Dates: Start: 06/01/22            Goal: LTG-By discharge, patient will transfer one surface to another with CGA and LRAD.       Dates: Start: 06/01/22            Goal: LTG-By discharge, patient will transfer in/out of a car with CGA and LRAD.       Dates: Start: 06/01/22            Goal: LTG-By discharge, patient will perform bed mobility independently.       Dates: Start: 06/01/22

## 2022-06-08 NOTE — ASSESSMENT & PLAN NOTE
U/S Abd mild hepatomegaly, exophytic interpolar left renal cyst identified on the recent CT is not well visualized on ultrasound, renal protocol MRI could be obtained if indicated, s/p cholecystectomy, mild bilateral hydronephrosis, no obstructive etiology identified, and nonobstructive right renal stone measuring 6 mm  Alk Phos levels improving

## 2022-06-08 NOTE — CARE PLAN
The patient is Watcher - Medium risk of patient condition declining or worsening    Shift Goals  Clinical Goals: safety  Patient Goals: pain management    Problem: Skin Integrity  Goal: Skin integrity is maintained or improved  Outcome: Not Met patient coccyx red, barrier paste applied, frequently checked and changed due to incontinence x 2.     Problem: Bowel Elimination  Goal: Patient will participate in bowel management program  Outcome: Not Met patient having multiple loose stools, r/o c-diff collected per order.

## 2022-06-08 NOTE — PROGRESS NOTES
Rehab Progress Note     Encounter Date: 6/8/2022    CC: Decreased mobility, confusion    Interval Events (Subjective)  Patient sitting up in room. He reports buttocks pain from loose stools. He has been having diffuse loose stools and his white count is elevated on AM labs. Discussed case with wife, patient and hospitalist and recommending full work-up including C diff. His hematuria has resolved with flushing. His Iron was low on Fe panel but with ongoing GI issues will hold on replacement.     Objective:  VITAL SIGNS: /58   Pulse 61   Temp 36.8 °C (98.2 °F) (Oral)   Resp 18   Ht 1.829 m (6')   Wt 85.5 kg (188 lb 7.9 oz)   SpO2 94%   BMI 25.56 kg/m²   Gen: NAD  Psych: Mood and affect appropriate  CV: RRR, no edema  Resp: CTAB, no upper airway sounds  Abd: NTND  Neuro: AOx3, following commands    Recent Results (from the past 72 hour(s))   POCT glucose device results    Collection Time: 06/05/22  5:01 PM   Result Value Ref Range    POC Glucose, Blood 118 (H) 65 - 99 mg/dL   POCT glucose device results    Collection Time: 06/05/22  9:58 PM   Result Value Ref Range    POC Glucose, Blood 156 (H) 65 - 99 mg/dL   POCT glucose device results    Collection Time: 06/06/22  7:34 AM   Result Value Ref Range    POC Glucose, Blood 130 (H) 65 - 99 mg/dL   POCT glucose device results    Collection Time: 06/06/22 11:10 AM   Result Value Ref Range    POC Glucose, Blood 188 (H) 65 - 99 mg/dL   POCT glucose device results    Collection Time: 06/06/22  5:24 PM   Result Value Ref Range    POC Glucose, Blood 118 (H) 65 - 99 mg/dL   POCT glucose device results    Collection Time: 06/06/22  9:27 PM   Result Value Ref Range    POC Glucose, Blood 144 (H) 65 - 99 mg/dL   POCT glucose device results    Collection Time: 06/07/22  7:21 AM   Result Value Ref Range    POC Glucose, Blood 82 65 - 99 mg/dL   POCT glucose device results    Collection Time: 06/07/22 11:09 AM   Result Value Ref Range    POC Glucose, Blood 141 (H) 65 - 99  mg/dL   POCT glucose device results    Collection Time: 06/07/22  5:25 PM   Result Value Ref Range    POC Glucose, Blood 148 (H) 65 - 99 mg/dL   POCT glucose device results    Collection Time: 06/07/22  9:14 PM   Result Value Ref Range    POC Glucose, Blood 158 (H) 65 - 99 mg/dL   CBC WITHOUT DIFFERENTIAL    Collection Time: 06/08/22  5:58 AM   Result Value Ref Range    WBC 17.9 (H) 4.8 - 10.8 K/uL    RBC 3.91 (L) 4.70 - 6.10 M/uL    Hemoglobin 11.3 (L) 14.0 - 18.0 g/dL    Hematocrit 34.1 (L) 42.0 - 52.0 %    MCV 87.2 81.4 - 97.8 fL    MCH 28.9 27.0 - 33.0 pg    MCHC 33.1 (L) 33.7 - 35.3 g/dL    RDW 45.3 35.9 - 50.0 fL    Platelet Count 475 (H) 164 - 446 K/uL    MPV 9.6 9.0 - 12.9 fL   Comp Metabolic Panel    Collection Time: 06/08/22  5:58 AM   Result Value Ref Range    Sodium 140 135 - 145 mmol/L    Potassium 4.2 3.6 - 5.5 mmol/L    Chloride 107 96 - 112 mmol/L    Co2 23 20 - 33 mmol/L    Anion Gap 10.0 7.0 - 16.0    Glucose 117 (H) 65 - 99 mg/dL    Bun 29 (H) 8 - 22 mg/dL    Creatinine 1.13 0.50 - 1.40 mg/dL    Calcium 8.2 (L) 8.5 - 10.5 mg/dL    AST(SGOT) 40 12 - 45 U/L    ALT(SGPT) 14 2 - 50 U/L    Alkaline Phosphatase 192 (H) 30 - 99 U/L    Total Bilirubin 0.3 0.1 - 1.5 mg/dL    Albumin 2.6 (L) 3.2 - 4.9 g/dL    Total Protein 5.1 (L) 6.0 - 8.2 g/dL    Globulin 2.5 1.9 - 3.5 g/dL    A-G Ratio 1.0 g/dL   MAGNESIUM    Collection Time: 06/08/22  5:58 AM   Result Value Ref Range    Magnesium 1.4 (L) 1.5 - 2.5 mg/dL   PHOSPHORUS    Collection Time: 06/08/22  5:58 AM   Result Value Ref Range    Phosphorus 3.2 2.5 - 4.5 mg/dL   IRON/TOTAL IRON BIND    Collection Time: 06/08/22  5:58 AM   Result Value Ref Range    Iron 20 (L) 50 - 180 ug/dL    Total Iron Binding 155 (L) 250 - 450 ug/dL    Unsat Iron Binding 135 110 - 370 ug/dL    % Saturation 13 (L) 15 - 55 %   ESTIMATED GFR    Collection Time: 06/08/22  5:58 AM   Result Value Ref Range    GFR (CKD-EPI) 68 >60 mL/min/1.73 m 2   POCT glucose device results    Collection  Time: 06/08/22  7:21 AM   Result Value Ref Range    POC Glucose, Blood 110 (H) 65 - 99 mg/dL   POCT glucose device results    Collection Time: 06/08/22 11:16 AM   Result Value Ref Range    POC Glucose, Blood 133 (H) 65 - 99 mg/dL       Current Facility-Administered Medications   Medication Frequency   • Pharmacy Consult Request PHARMACY TO DOSE   • magnesium sulfate IVPB premix 2 g Once   • miconazole 2%-zinc oxide (Vinny) topical cream Q6HRS PRN   • insulin GLARGINE (Lantus,Semglee) injection Q EVENING   • phosphorus (K-Phos-Neutral) per tablet 250 mg TID   • insulin regular (HumuLIN R,NovoLIN R) injection 4X/DAY ACHS    And   • dextrose 50% (D50W) injection 25 g Q15 MIN PRN   • amLODIPine (NORVASC) tablet 10 mg Q DAY   • baclofen (LIORESAL) tablet 5 mg TID   • loperamide (IMODIUM) capsule 2 mg 4X/DAY PRN   • lidocaine (LIDODERM) 5 % 1 Patch Q24HR   • vitamin D3 (cholecalciferol) tablet 1,000 Units DAILY   • Respiratory Therapy Consult Continuous RT   • hydrALAZINE (APRESOLINE) tablet 25 mg Q8HRS PRN   • acetaminophen (Tylenol) tablet 650 mg Q4HRS PRN   • polyethylene glycol/lytes (MIRALAX) PACKET 1 Packet QDAY PRN    And   • magnesium hydroxide (MILK OF MAGNESIA) suspension 30 mL QDAY PRN    And   • bisacodyl (DULCOLAX) suppository 10 mg QDAY PRN   • omeprazole (PRILOSEC) capsule 20 mg DAILY   • artificial tears ophthalmic solution 1 Drop PRN   • benzocaine-menthol (Cepacol) lozenge 1 Lozenge Q2HRS PRN   • mag hydrox-al hydrox-simeth (MAALOX PLUS ES or MYLANTA DS) suspension 20 mL Q2HRS PRN   • ondansetron (ZOFRAN ODT) dispertab 4 mg 4X/DAY PRN    Or   • ondansetron (ZOFRAN) syringe/vial injection 4 mg 4X/DAY PRN   • traZODone (DESYREL) tablet 50 mg QHS PRN   • sodium chloride (OCEAN) 0.65 % nasal spray 2 Spray PRN   • oxyCODONE immediate-release (ROXICODONE) tablet 5 mg Q3HRS PRN    Or   • oxyCODONE immediate release (ROXICODONE) tablet 10 mg Q3HRS PRN   • midazolam (VERSED) 5 mg/mL (1 mL vial) PRN   •  acetaminophen (Tylenol) tablet 650 mg Q6HRS PRN   • allopurinol (ZYLOPRIM) tablet 100 mg QDAY   • atorvastatin (LIPITOR) tablet 80 mg Q EVENING   • clopidogrel (PLAVIX) tablet 75 mg QDAY   • FLUoxetine (PROZAC) capsule 40 mg DAILY   • gabapentin (NEURONTIN) capsule 100 mg QHS PRN   • losartan (COZAAR) tablet 50 mg BID   • metoprolol SR (TOPROL XL) tablet 100 mg Q EVENING   • tamsulosin (FLOMAX) capsule 0.4 mg DAILY       Orders Placed This Encounter   Procedures   • Diet Order Diet: Cardiac; Second Modifier: (optional): Consistent CHO (Diabetic)     Standing Status:   Standing     Number of Occurrences:   1     Order Specific Question:   Diet:     Answer:   Cardiac [6]     Order Specific Question:   Second Modifier: (optional)     Answer:   Consistent CHO (Diabetic) [4]       Assessment:  Active Hospital Problems    Diagnosis    • *Acute encephalopathy    • Hyponatremia    • Dehydration    • Essential hypertension, benign    • Benign prostatic hyperplasia with urinary obstruction    • Diarrhea    • GERD with esophagitis    • Recurrent UTI    • Hypomagnesemia    • Type 2 diabetes mellitus, with long-term current use of insulin ([facility])    • Stage 3b chronic kidney disease (HCC)        Medical Decision Making and Plan:  Acute toxic encephalopathy - Patient with UTI with urosepsis s/p IV antibiotics. Patient has urinary retention and requires occasional catheterization putting him at risk for recurrent UTIs. 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 On Plavix and statin  -Will start low dose Baclofen although most likely contracture. Serial casting to start 6/8, therapy holds otherwise for GI and leukocytosis    HTN/CAD - Patient on Plavix. Patient on Losartan 50 mg BID, Metoprolol 100 mg XL  -Consult Hospitalist     CRISTHIAND - Patient on  Atorvastatin 80 mg QHS     Leukocytosis - On treatment for UTI. On Ancef with recommendation to switch to Augment. Will check CBC in AM before switch  -Repeat 16.5 up from 16.1, consult Hospitalist. Improved to 12.7 on 6/5/22. Repeat 6/8 with Leukocytosis 17.9 and diffuse loose stools.   -Check C diff. Check UA. Check Blood cultures. KUB with distention and featureless colon. Discussed with hospitalist and start antibiotics including Flagyl. CT abdomen      Anemia - Check AM CBC - 12.2 Repeat 11.3, Fe low but with GI issues holding on starting 6/8     DM2 with hyperglycemia - Patient on Glargine 11 U and SSI  -Consult hospitalist     Depression - Patient on Fluoxetine 40 mg daily      Hx of Gout - Patient on Allopurinol 100 mg daily     Urinary Retention - Patient requiring catheterization ~1 time daily. Follows with Urology. Continue Flomax  -Hematuria on 6/7 with significant amount. Hold Xarelto. Flush bladder.      Vitamin D Deficiency - 29 on admission. Start 1000 U     DVT Ppx - Patient on Xarelto ppx dose. On hold for bleeding.     Total time:  36 minutes.  I spent greater than 50% of the time for patient care, counseling, and coordination on this date, including unit/floor time, and face-to-face time with the patient as per interval events and assessment and plan above. Topics discussed included leukocytosis, diffuse stools, KUB abnormal, CT abdomen, C Diff pending, and start antibiotics. Discussed at length with hospitalist and family.     Katheryn Pratt M.D.

## 2022-06-08 NOTE — CARE PLAN
Problem: Problem Solving STGs  Goal: STG-Within one week, patient will complete SCCAN with appropriate goals to follow   Outcome: Met  Note: SCCAN completed   Goal: STG-Within one week, patient will complete o-log with a final score of 25/30 over three consecutive days  Outcome: Not Met  Note: Pt's highest O-log score to date was 23/30      Problem: Memory STGs  Goal: STG-Within one week, patient will remember safety precautions related to hospitalization with 75% accuracy.   Outcome: Not Met  Note: Mod-max A for pt to recall new information related to his current hospitalization

## 2022-06-09 ENCOUNTER — APPOINTMENT (OUTPATIENT)
Dept: RADIOLOGY | Facility: REHABILITATION | Age: 77
DRG: 092 | End: 2022-06-09
Attending: HOSPITALIST
Payer: MEDICARE

## 2022-06-09 ENCOUNTER — APPOINTMENT (OUTPATIENT)
Dept: RADIOLOGY | Facility: MEDICAL CENTER | Age: 77
DRG: 092 | End: 2022-06-09
Attending: HOSPITALIST
Payer: MEDICARE

## 2022-06-09 LAB
ALBUMIN SERPL BCP-MCNC: 2.6 G/DL (ref 3.2–4.9)
ALBUMIN/GLOB SERPL: 0.9 G/DL
ALP SERPL-CCNC: 205 U/L (ref 30–99)
ALT SERPL-CCNC: 12 U/L (ref 2–50)
ANION GAP SERPL CALC-SCNC: 11 MMOL/L (ref 7–16)
AST SERPL-CCNC: 29 U/L (ref 12–45)
BILIRUB SERPL-MCNC: 0.3 MG/DL (ref 0.1–1.5)
BUN SERPL-MCNC: 32 MG/DL (ref 8–22)
CALCIUM SERPL-MCNC: 8.3 MG/DL (ref 8.5–10.5)
CHLORIDE SERPL-SCNC: 108 MMOL/L (ref 96–112)
CO2 SERPL-SCNC: 24 MMOL/L (ref 20–33)
CREAT SERPL-MCNC: 1.14 MG/DL (ref 0.5–1.4)
ERYTHROCYTE [DISTWIDTH] IN BLOOD BY AUTOMATED COUNT: 45.8 FL (ref 35.9–50)
GFR SERPLBLD CREATININE-BSD FMLA CKD-EPI: 67 ML/MIN/1.73 M 2
GLOBULIN SER CALC-MCNC: 2.9 G/DL (ref 1.9–3.5)
GLUCOSE BLD STRIP.AUTO-MCNC: 113 MG/DL (ref 65–99)
GLUCOSE BLD STRIP.AUTO-MCNC: 118 MG/DL (ref 65–99)
GLUCOSE BLD STRIP.AUTO-MCNC: 122 MG/DL (ref 65–99)
GLUCOSE BLD STRIP.AUTO-MCNC: 128 MG/DL (ref 65–99)
GLUCOSE BLD STRIP.AUTO-MCNC: 82 MG/DL (ref 65–99)
GLUCOSE SERPL-MCNC: 133 MG/DL (ref 65–99)
HCT VFR BLD AUTO: 36.1 % (ref 42–52)
HGB BLD-MCNC: 11.7 G/DL (ref 14–18)
MAGNESIUM SERPL-MCNC: 1.8 MG/DL (ref 1.5–2.5)
MCH RBC QN AUTO: 28.5 PG (ref 27–33)
MCHC RBC AUTO-ENTMCNC: 32.4 G/DL (ref 33.7–35.3)
MCV RBC AUTO: 87.8 FL (ref 81.4–97.8)
PHOSPHATE SERPL-MCNC: 3.8 MG/DL (ref 2.5–4.5)
PLATELET # BLD AUTO: 499 K/UL (ref 164–446)
PMV BLD AUTO: 9.7 FL (ref 9–12.9)
POTASSIUM SERPL-SCNC: 4.2 MMOL/L (ref 3.6–5.5)
PROCALCITONIN SERPL-MCNC: 0.1 NG/ML
PROT SERPL-MCNC: 5.5 G/DL (ref 6–8.2)
RBC # BLD AUTO: 4.11 M/UL (ref 4.7–6.1)
SODIUM SERPL-SCNC: 143 MMOL/L (ref 135–145)
WBC # BLD AUTO: 18.4 K/UL (ref 4.8–10.8)

## 2022-06-09 PROCEDURE — 97530 THERAPEUTIC ACTIVITIES: CPT

## 2022-06-09 PROCEDURE — 770010 HCHG ROOM/CARE - REHAB SEMI PRIVAT*

## 2022-06-09 PROCEDURE — 82962 GLUCOSE BLOOD TEST: CPT | Mod: 91

## 2022-06-09 PROCEDURE — 700102 HCHG RX REV CODE 250 W/ 637 OVERRIDE(OP): Performed by: PHYSICAL MEDICINE & REHABILITATION

## 2022-06-09 PROCEDURE — 700102 HCHG RX REV CODE 250 W/ 637 OVERRIDE(OP): Performed by: HOSPITALIST

## 2022-06-09 PROCEDURE — 76700 US EXAM ABDOM COMPLETE: CPT

## 2022-06-09 PROCEDURE — 700101 HCHG RX REV CODE 250: Performed by: PHYSICAL MEDICINE & REHABILITATION

## 2022-06-09 PROCEDURE — 99233 SBSQ HOSP IP/OBS HIGH 50: CPT | Performed by: PHYSICAL MEDICINE & REHABILITATION

## 2022-06-09 PROCEDURE — 700105 HCHG RX REV CODE 258: Performed by: PHYSICAL MEDICINE & REHABILITATION

## 2022-06-09 PROCEDURE — 84145 PROCALCITONIN (PCT): CPT

## 2022-06-09 PROCEDURE — 700105 HCHG RX REV CODE 258: Performed by: HOSPITALIST

## 2022-06-09 PROCEDURE — 36415 COLL VENOUS BLD VENIPUNCTURE: CPT

## 2022-06-09 PROCEDURE — 85027 COMPLETE CBC AUTOMATED: CPT

## 2022-06-09 PROCEDURE — 97129 THER IVNTJ 1ST 15 MIN: CPT

## 2022-06-09 PROCEDURE — A9270 NON-COVERED ITEM OR SERVICE: HCPCS | Performed by: HOSPITALIST

## 2022-06-09 PROCEDURE — 700111 HCHG RX REV CODE 636 W/ 250 OVERRIDE (IP): Performed by: PHYSICAL MEDICINE & REHABILITATION

## 2022-06-09 PROCEDURE — 99233 SBSQ HOSP IP/OBS HIGH 50: CPT | Performed by: HOSPITALIST

## 2022-06-09 PROCEDURE — 97130 THER IVNTJ EA ADDL 15 MIN: CPT

## 2022-06-09 PROCEDURE — 84100 ASSAY OF PHOSPHORUS: CPT

## 2022-06-09 PROCEDURE — A9270 NON-COVERED ITEM OR SERVICE: HCPCS | Performed by: PHYSICAL MEDICINE & REHABILITATION

## 2022-06-09 PROCEDURE — 83735 ASSAY OF MAGNESIUM: CPT

## 2022-06-09 PROCEDURE — 80053 COMPREHEN METABOLIC PANEL: CPT

## 2022-06-09 RX ORDER — SODIUM CHLORIDE 9 MG/ML
1000 INJECTION, SOLUTION INTRAVENOUS ONCE
Status: COMPLETED | OUTPATIENT
Start: 2022-06-09 | End: 2022-06-09

## 2022-06-09 RX ADMIN — VANCOMYCIN HYDROCHLORIDE 125 MG: KIT ORAL at 17:59

## 2022-06-09 RX ADMIN — BACLOFEN 5 MG: 10 TABLET ORAL at 09:12

## 2022-06-09 RX ADMIN — TAMSULOSIN HYDROCHLORIDE 0.4 MG: 0.4 CAPSULE ORAL at 09:11

## 2022-06-09 RX ADMIN — SODIUM CHLORIDE 1000 ML: 9 INJECTION, SOLUTION INTRAVENOUS at 14:41

## 2022-06-09 RX ADMIN — DIBASIC SODIUM PHOSPHATE, MONOBASIC POTASSIUM PHOSPHATE AND MONOBASIC SODIUM PHOSPHATE 250 MG: 852; 155; 130 TABLET ORAL at 09:11

## 2022-06-09 RX ADMIN — TRAZODONE HYDROCHLORIDE 50 MG: 50 TABLET ORAL at 19:44

## 2022-06-09 RX ADMIN — CEFTRIAXONE SODIUM 1 G: 1 INJECTION, POWDER, FOR SOLUTION INTRAMUSCULAR; INTRAVENOUS at 16:58

## 2022-06-09 RX ADMIN — OMEPRAZOLE 20 MG: 20 CAPSULE, DELAYED RELEASE ORAL at 09:12

## 2022-06-09 RX ADMIN — LIDOCAINE 1 PATCH: 50 PATCH TOPICAL at 14:34

## 2022-06-09 RX ADMIN — AMLODIPINE BESYLATE 10 MG: 5 TABLET ORAL at 05:21

## 2022-06-09 RX ADMIN — CLOPIDOGREL 75 MG: 75 TABLET, FILM COATED ORAL at 05:22

## 2022-06-09 RX ADMIN — OXYCODONE HYDROCHLORIDE 10 MG: 10 TABLET ORAL at 19:45

## 2022-06-09 RX ADMIN — LOPERAMIDE HYDROCHLORIDE 2 MG: 2 CAPSULE ORAL at 19:44

## 2022-06-09 RX ADMIN — Medication 1000 UNITS: at 09:13

## 2022-06-09 RX ADMIN — VANCOMYCIN HYDROCHLORIDE 125 MG: KIT ORAL at 23:35

## 2022-06-09 RX ADMIN — BACLOFEN 5 MG: 10 TABLET ORAL at 19:44

## 2022-06-09 RX ADMIN — ALLOPURINOL 100 MG: 100 TABLET ORAL at 05:25

## 2022-06-09 RX ADMIN — METRONIDAZOLE 500 MG: 500 TABLET ORAL at 05:22

## 2022-06-09 RX ADMIN — VANCOMYCIN HYDROCHLORIDE 125 MG: KIT ORAL at 13:03

## 2022-06-09 RX ADMIN — FLUOXETINE 40 MG: 20 CAPSULE ORAL at 09:11

## 2022-06-09 RX ADMIN — LOSARTAN POTASSIUM 50 MG: 25 TABLET, FILM COATED ORAL at 09:11

## 2022-06-09 RX ADMIN — ATORVASTATIN CALCIUM 80 MG: 40 TABLET, FILM COATED ORAL at 19:44

## 2022-06-09 RX ADMIN — BACLOFEN 5 MG: 10 TABLET ORAL at 14:38

## 2022-06-09 ASSESSMENT — ENCOUNTER SYMPTOMS
NAUSEA: 0
MEMORY LOSS: 1
COUGH: 0
POLYDIPSIA: 0
EYES NEGATIVE: 1
CHILLS: 0
DIARRHEA: 1
PALPITATIONS: 0
SHORTNESS OF BREATH: 0
ABDOMINAL PAIN: 0
BRUISES/BLEEDS EASILY: 0
FEVER: 0
MUSCULOSKELETAL NEGATIVE: 1
VOMITING: 0

## 2022-06-09 ASSESSMENT — PAIN DESCRIPTION - PAIN TYPE: TYPE: ACUTE PAIN

## 2022-06-09 NOTE — CARE PLAN
Problem: Knowledge Deficit - Standard  Goal: Patient and family/care givers will demonstrate understanding of plan of care, disease process/condition, diagnostic tests and medications  Outcome: Progressing     Problem: Skin Integrity  Goal: Skin integrity is maintained or improved  Outcome: Progressing     Problem: Fall Risk - Rehab  Goal: Patient will remain free from falls  Outcome: Progressing   The patient is Watcher - Medium risk of patient condition declining or worsening    Shift Goals  Clinical Goals: safety  Patient Goals: rest comfortably    Progress made toward(s) clinical / shift goals:  Patient sleeping no distress noted    Patient is not progressing towards the following goals:

## 2022-06-09 NOTE — PROGRESS NOTES
Rehab Progress Note     Encounter Date: 6/9/2022    CC: Decreased mobility, confusion    Interval Events (Subjective)  Patient sitting up in bed. He reports diffuse diarrhea. Discussed about C Diff and his CT abdomen. Discussed do not recommend rectal tube in setting of acute colitis and discussed with hospitalist. Therapy on hold except in bed. Discussed with hospitalist and to change to Vancomycin PO from flagyl/cipro. UA was positive and he is now on Ceftriaxone. He reports he is able to wiggle his fingers a little better now that casted further extended at the elbow. Discussed would have IDT later today to discuss discharge planning.     IDT Team Meeting 6/9/2022    Katheryn SANDOVAL M.D., was present and led the interdisciplinary team conference on 6/9/2022.  I led the IDT conference and agree with the IDT conference documentation and plan of care as noted below.     RN:  Diet Regular   % Meal     Pain    Sleep    Bowel Incontinent   Bladder Incontinent   In's & Out's    C diff     PT:  Bed Mobility    Transfers Mod-max   Mobility Mod-max   Stairs    Limited due to incontinence  Limited by pain; anal pain  Tolerating casting  Was doing much better including walking along with rail assist    OT:  Eating    Grooming    Bathing    UB Dressing max   LB Dressing max   Toileting max   Shower & Transfer    Limited by incontinence and medical    SLP:  Close to baseline until today  Decline today and more confused due to UTI  Self limiting     CM:  Continues to work on disposition and DME needs.      DC/Disposition:  6/21/22    Objective:  VITAL SIGNS: /74   Pulse 77   Temp 36.4 °C (97.5 °F) (Oral)   Resp 18   Ht 1.829 m (6')   Wt 85.5 kg (188 lb 7.9 oz)   SpO2 91%   BMI 25.56 kg/m²   Gen: NAD  Psych: Mood and affect appropriate  CV: RRR, no edema  Resp: CTAB, no upper airway sounds  Abd: NTND  Neuro: AOx3, following commands  Unchanged from 6/8/22 in addition left arm casted into around 70 degree  extension wiggling fingers    Recent Results (from the past 72 hour(s))   POCT glucose device results    Collection Time: 06/06/22  5:24 PM   Result Value Ref Range    POC Glucose, Blood 118 (H) 65 - 99 mg/dL   POCT glucose device results    Collection Time: 06/06/22  9:27 PM   Result Value Ref Range    POC Glucose, Blood 144 (H) 65 - 99 mg/dL   POCT glucose device results    Collection Time: 06/07/22  7:21 AM   Result Value Ref Range    POC Glucose, Blood 82 65 - 99 mg/dL   POCT glucose device results    Collection Time: 06/07/22 11:09 AM   Result Value Ref Range    POC Glucose, Blood 141 (H) 65 - 99 mg/dL   POCT glucose device results    Collection Time: 06/07/22  5:25 PM   Result Value Ref Range    POC Glucose, Blood 148 (H) 65 - 99 mg/dL   POCT glucose device results    Collection Time: 06/07/22  9:14 PM   Result Value Ref Range    POC Glucose, Blood 158 (H) 65 - 99 mg/dL   CBC WITHOUT DIFFERENTIAL    Collection Time: 06/08/22  5:58 AM   Result Value Ref Range    WBC 17.9 (H) 4.8 - 10.8 K/uL    RBC 3.91 (L) 4.70 - 6.10 M/uL    Hemoglobin 11.3 (L) 14.0 - 18.0 g/dL    Hematocrit 34.1 (L) 42.0 - 52.0 %    MCV 87.2 81.4 - 97.8 fL    MCH 28.9 27.0 - 33.0 pg    MCHC 33.1 (L) 33.7 - 35.3 g/dL    RDW 45.3 35.9 - 50.0 fL    Platelet Count 475 (H) 164 - 446 K/uL    MPV 9.6 9.0 - 12.9 fL   Comp Metabolic Panel    Collection Time: 06/08/22  5:58 AM   Result Value Ref Range    Sodium 140 135 - 145 mmol/L    Potassium 4.2 3.6 - 5.5 mmol/L    Chloride 107 96 - 112 mmol/L    Co2 23 20 - 33 mmol/L    Anion Gap 10.0 7.0 - 16.0    Glucose 117 (H) 65 - 99 mg/dL    Bun 29 (H) 8 - 22 mg/dL    Creatinine 1.13 0.50 - 1.40 mg/dL    Calcium 8.2 (L) 8.5 - 10.5 mg/dL    AST(SGOT) 40 12 - 45 U/L    ALT(SGPT) 14 2 - 50 U/L    Alkaline Phosphatase 192 (H) 30 - 99 U/L    Total Bilirubin 0.3 0.1 - 1.5 mg/dL    Albumin 2.6 (L) 3.2 - 4.9 g/dL    Total Protein 5.1 (L) 6.0 - 8.2 g/dL    Globulin 2.5 1.9 - 3.5 g/dL    A-G Ratio 1.0 g/dL   MAGNESIUM     Collection Time: 06/08/22  5:58 AM   Result Value Ref Range    Magnesium 1.4 (L) 1.5 - 2.5 mg/dL   PHOSPHORUS    Collection Time: 06/08/22  5:58 AM   Result Value Ref Range    Phosphorus 3.2 2.5 - 4.5 mg/dL   IRON/TOTAL IRON BIND    Collection Time: 06/08/22  5:58 AM   Result Value Ref Range    Iron 20 (L) 50 - 180 ug/dL    Total Iron Binding 155 (L) 250 - 450 ug/dL    Unsat Iron Binding 135 110 - 370 ug/dL    % Saturation 13 (L) 15 - 55 %   ESTIMATED GFR    Collection Time: 06/08/22  5:58 AM   Result Value Ref Range    GFR (CKD-EPI) 68 >60 mL/min/1.73 m 2   POCT glucose device results    Collection Time: 06/08/22  7:21 AM   Result Value Ref Range    POC Glucose, Blood 110 (H) 65 - 99 mg/dL   BLOOD CULTURE    Collection Time: 06/08/22  9:59 AM    Specimen: Peripheral; Blood   Result Value Ref Range    Significant Indicator NEG     Source BLD     Site PERIPHERAL     Culture Result       No Growth  Note: Blood cultures are incubated for 5 days and  are monitored continuously.Positive blood cultures  are called to the RN and reported as soon as  they are identified.     BLOOD CULTURE    Collection Time: 06/08/22  9:59 AM    Specimen: Peripheral; Blood   Result Value Ref Range    Significant Indicator NEG     Source BLD     Site PERIPHERAL     Culture Result       No Growth  Note: Blood cultures are incubated for 5 days and  are monitored continuously.Positive blood cultures  are called to the RN and reported as soon as  they are identified.     POCT glucose device results    Collection Time: 06/08/22 11:16 AM   Result Value Ref Range    POC Glucose, Blood 133 (H) 65 - 99 mg/dL   URINALYSIS    Collection Time: 06/08/22  2:24 PM    Specimen: Urine, [facility] Cath   Result Value Ref Range    Color DK Yellow     Character Cloudy (A)     Specific Gravity 1.018 <1.035    Ph 5.0 5.0 - 8.0    Glucose Negative Negative mg/dL    Ketones Negative Negative mg/dL    Protein 30 (A) Negative mg/dL    Bilirubin Negative Negative     Urobilinogen, Urine 0.2 Negative    Nitrite Negative Negative    Leukocyte Esterase Large (A) Negative    Occult Blood Large (A) Negative    Micro Urine Req Microscopic    C Diff by PCR rflx Toxin    Collection Time: 06/08/22  2:24 PM    Specimen: Stool   Result Value Ref Range    C Diff by PCR Positive Negative    [id number] Presumptive Negative Negative   URINE MICROSCOPIC (W/UA)    Collection Time: 06/08/22  2:24 PM   Result Value Ref Range    WBC  (A) /hpf    RBC  (A) /hpf    Bacteria Negative None /hpf    Epithelial Cells Negative /hpf   POCT glucose device results    Collection Time: 06/08/22  5:16 PM   Result Value Ref Range    POC Glucose, Blood 89 65 - 99 mg/dL   POCT glucose device results    Collection Time: 06/08/22  9:26 PM   Result Value Ref Range    POC Glucose, Blood 87 65 - 99 mg/dL   POCT glucose device results    Collection Time: 06/09/22  1:35 AM   Result Value Ref Range    POC Glucose, Blood 82 65 - 99 mg/dL   MAGNESIUM    Collection Time: 06/09/22  5:32 AM   Result Value Ref Range    Magnesium 1.8 1.5 - 2.5 mg/dL   PHOSPHORUS    Collection Time: 06/09/22  5:32 AM   Result Value Ref Range    Phosphorus 3.8 2.5 - 4.5 mg/dL   CBC WITHOUT DIFFERENTIAL    Collection Time: 06/09/22  5:32 AM   Result Value Ref Range    WBC 18.4 (H) 4.8 - 10.8 K/uL    RBC 4.11 (L) 4.70 - 6.10 M/uL    Hemoglobin 11.7 (L) 14.0 - 18.0 g/dL    Hematocrit 36.1 (L) 42.0 - 52.0 %    MCV 87.8 81.4 - 97.8 fL    MCH 28.5 27.0 - 33.0 pg    MCHC 32.4 (L) 33.7 - 35.3 g/dL    RDW 45.8 35.9 - 50.0 fL    Platelet Count 499 (H) 164 - 446 K/uL    MPV 9.7 9.0 - 12.9 fL   Comp Metabolic Panel    Collection Time: 06/09/22  5:32 AM   Result Value Ref Range    Sodium 143 135 - 145 mmol/L    Potassium 4.2 3.6 - 5.5 mmol/L    Chloride 108 96 - 112 mmol/L    Co2 24 20 - 33 mmol/L    Anion Gap 11.0 7.0 - 16.0    Glucose 133 (H) 65 - 99 mg/dL    Bun 32 (H) 8 - 22 mg/dL    Creatinine 1.14 0.50 - 1.40 mg/dL    Calcium 8.3 (L) 8.5 -  10.5 mg/dL    AST(SGOT) 29 12 - 45 U/L    ALT(SGPT) 12 2 - 50 U/L    Alkaline Phosphatase 205 (H) 30 - 99 U/L    Total Bilirubin 0.3 0.1 - 1.5 mg/dL    Albumin 2.6 (L) 3.2 - 4.9 g/dL    Total Protein 5.5 (L) 6.0 - 8.2 g/dL    Globulin 2.9 1.9 - 3.5 g/dL    A-G Ratio 0.9 g/dL   PROCALCITONIN    Collection Time: 06/09/22  5:32 AM   Result Value Ref Range    Procalcitonin 0.10 <0.25 ng/mL   ESTIMATED GFR    Collection Time: 06/09/22  5:32 AM   Result Value Ref Range    GFR (CKD-EPI) 67 >60 mL/min/1.73 m 2   POCT glucose device results    Collection Time: 06/09/22  8:14 AM   Result Value Ref Range    POC Glucose, Blood 122 (H) 65 - 99 mg/dL   POCT glucose device results    Collection Time: 06/09/22 11:06 AM   Result Value Ref Range    POC Glucose, Blood 113 (H) 65 - 99 mg/dL       Current Facility-Administered Medications   Medication Frequency   • [START ON 6/10/2022] cefTRIAXone (Rocephin) 1 g in  mL IVPB Q24HRS   • vancomycin 50 mg/mL oral soln 125 mg Q6HRS   • Pharmacy Consult Request PHARMACY TO DOSE   • miconazole 2%-zinc oxide (Vinny) topical cream Q6HRS PRN   • insulin GLARGINE (Lantus,Semglee) injection Q EVENING   • phosphorus (K-Phos-Neutral) per tablet 250 mg TID   • insulin regular (HumuLIN R,NovoLIN R) injection 4X/DAY ACHS    And   • dextrose 50% (D50W) injection 25 g Q15 MIN PRN   • amLODIPine (NORVASC) tablet 10 mg Q DAY   • baclofen (LIORESAL) tablet 5 mg TID   • loperamide (IMODIUM) capsule 2 mg 4X/DAY PRN   • lidocaine (LIDODERM) 5 % 1 Patch Q24HR   • vitamin D3 (cholecalciferol) tablet 1,000 Units DAILY   • Respiratory Therapy Consult Continuous RT   • hydrALAZINE (APRESOLINE) tablet 25 mg Q8HRS PRN   • acetaminophen (Tylenol) tablet 650 mg Q4HRS PRN   • polyethylene glycol/lytes (MIRALAX) PACKET 1 Packet QDAY PRN    And   • magnesium hydroxide (MILK OF MAGNESIA) suspension 30 mL QDAY PRN    And   • bisacodyl (DULCOLAX) suppository 10 mg QDAY PRN   • omeprazole (PRILOSEC) capsule 20 mg DAILY    • artificial tears ophthalmic solution 1 Drop PRN   • benzocaine-menthol (Cepacol) lozenge 1 Lozenge Q2HRS PRN   • mag hydrox-al hydrox-simeth (MAALOX PLUS ES or MYLANTA DS) suspension 20 mL Q2HRS PRN   • ondansetron (ZOFRAN ODT) dispertab 4 mg 4X/DAY PRN    Or   • ondansetron (ZOFRAN) syringe/vial injection 4 mg 4X/DAY PRN   • traZODone (DESYREL) tablet 50 mg QHS PRN   • sodium chloride (OCEAN) 0.65 % nasal spray 2 Spray PRN   • oxyCODONE immediate-release (ROXICODONE) tablet 5 mg Q3HRS PRN    Or   • oxyCODONE immediate release (ROXICODONE) tablet 10 mg Q3HRS PRN   • midazolam (VERSED) 5 mg/mL (1 mL vial) PRN   • acetaminophen (Tylenol) tablet 650 mg Q6HRS PRN   • allopurinol (ZYLOPRIM) tablet 100 mg QDAY   • atorvastatin (LIPITOR) tablet 80 mg Q EVENING   • clopidogrel (PLAVIX) tablet 75 mg QDAY   • FLUoxetine (PROZAC) capsule 40 mg DAILY   • gabapentin (NEURONTIN) capsule 100 mg QHS PRN   • losartan (COZAAR) tablet 50 mg BID   • metoprolol SR (TOPROL XL) tablet 100 mg Q EVENING   • tamsulosin (FLOMAX) capsule 0.4 mg DAILY       Orders Placed This Encounter   Procedures   • Diet Order Diet: Consistent CHO (Diabetic); Second Modifier: (optional): Cardiac     Standing Status:   Standing     Number of Occurrences:   1     Order Specific Question:   Diet:     Answer:   Consistent CHO (Diabetic) [4]     Order Specific Question:   Second Modifier: (optional)     Answer:   Cardiac [6]       Assessment:  Active Hospital Problems    Diagnosis    • *Acute encephalopathy    • Hyponatremia    • Dehydration    • Essential hypertension, benign    • Benign prostatic hyperplasia with urinary obstruction    • Diarrhea    • GERD with esophagitis    • Recurrent UTI    • Hypomagnesemia    • Type 2 diabetes mellitus, with long-term current use of insulin (HCC)    • Stage 3b chronic kidney disease (HCC)        Medical Decision Making and Plan:  Acute toxic encephalopathy - Patient with UTI with urosepsis s/p IV antibiotics. Patient  has urinary retention and requires occasional catheterization putting him at risk for recurrent UTIs. 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 On Plavix and statin  -Will start low dose Baclofen although most likely contracture. Serial casting to start 6/8, therapy holds otherwise for GI and leukocytosis    HTN/CAD - Patient on Plavix. Patient on Losartan 50 mg BID, Metoprolol 100 mg XL  -Consult Hospitalist     HLD - Patient on Atorvastatin 80 mg QHS     Leukocytosis - On treatment for UTI. On Ancef with recommendation to switch to Augment. Will check CBC in AM before switch  -Repeat 16.5 up from 16.1, consult Hospitalist. Improved to 12.7 on 6/5/22. Repeat 6/8 with Leukocytosis 17.9 and diffuse loose stools.   -Check C diff. Check UA. Check Blood cultures. KUB with distention and featureless colon. Discussed with hospitalist and start antibiotics including Flagyl. CT abdomen - possible small left cyst bleeding. Will monitor CBC. On Ceftriaxone.     C Diff positive on 6/8. On Vancomycin PO    Anemia - Check AM CBC - 12.2 Repeat 11.3, Fe low but with GI issues holding on starting 6/8     DM2 with hyperglycemia - Patient on Glargine 11 U and SSI  -Consult hospitalist     Depression - Patient on Fluoxetine 40 mg daily      Hx of Gout - Patient on Allopurinol 100 mg daily     Urinary Retention - Patient requiring catheterization ~1 time daily. Follows with Urology. Continue Flomax  -Hematuria on 6/7 with significant amount. Hold Xarelto. Flush bladder.  Improved hematuria     Vitamin D Deficiency - 29 on admission. Start 1000 U     DVT Ppx - Patient on Xarelto ppx dose. On hold for bleeding.     Total time:  36 minutes.  I spent greater than 50% of the time for patient care, counseling, and coordination on this date, including  unit/floor time, and face-to-face time with the patient as per interval events and assessment and plan above. Topics discussed included discharge planning, C diff colitis, Vancomycin, and Ceftriaxone for UTI. Patient was discussed separately in IDT today; please see details above.    Katheryn Pratt M.D.

## 2022-06-09 NOTE — CARE PLAN
Problem: Bathing  Goal: STG-Within one week, patient will bathe w/ max A using DME as needed.   Outcome: Not Met  Note: Pt requires Total A     Problem: Dressing  Goal: STG-Within one week, patient will dress UB w/ mod A.  Outcome: Not Met  Note: Pt requires Max A  Goal: STG-Within one week, patient will dress LB w/ mod A.   Outcome: Not Met  Note: Pt requires Total Ax 2 in bed and from w/c level     Problem: Toileting  Goal: STG-Within one week, patient will complete toileting tasks w/ max A using DME as needed.   Outcome: Not Met  Note: Pt requires Total x2     Problem: Functional Transfers  Goal: STG-Within one week, patient will transfer to toilet w/ max A using DME as needed.  Outcome: Not Met  Note: Pt requires Total x2  Goal: STG-Within one week, patient will transfer to step in shower with max A using DME as needed.  Outcome: Not Met  Note: Pt requires Total x2 for walk in shower

## 2022-06-09 NOTE — THERAPY
"Speech Language Pathology  Daily Treatment     Patient Name: Av Bob  Age:  77 y.o., Sex:  male  Medical Record #: [id number]  Today's Date: 6/9/2022     Precautions  Precautions: Fall Risk, Swallow Precautions ( See Comments)  Comments: L reic from previous CVA; cdiff rule out, poor OOB tolerance, wounds on buttocks, serial cast to LUE 6/8/22    Subjective    Pt awake in bed on arrival.  Pt with significantly increased confusion and emotionally labile throughout this session.       Objective       06/09/22 0831   SLP Total Time Spent   SLP Individual Total Time Spent (Mins) 30   Treatment Charges   SLP Cognitive Skill Development First 15 Minutes 1   SLP Cognitive Skill Development Additional 15 Minutes 1       Assessment    O-log completed, pt scored a 12/30 which is significantly lower that all previous scores (scored 20/30 on 6/7).  Pt was unable to independently recall any orientation information and initially thought he was in [place], [place] at a school.  When pt was reoriented to his current location pt became emotional stating \"I'm so confused, I don't know where I am\".  Pt was able to recall some information about his recent rehab stay (pt able to recall getting a cast put on his left arm yesterday) throughout this session, but required max cues.  Dr. Malcolm present during this session to assess pt.        Plan    Continue O-log, memory log       Speech Therapy Problems (Active)       Problem: Memory STGs       Dates: Start: 06/01/22         Goal: STG-Within one week, patient will remember safety precautions related to hospitalization with 75% accuracy.        Dates: Start: 06/01/22         Goal Note filed on 06/08/22 1598 by Harpal Williamson MS,CCC-SLP       Mod-max A for pt to recall new information related to his current hospitalization                  Problem: Problem Solving STGs       Dates: Start: 06/01/22         Goal: STG-Within one week, patient will complete o-log with a final score of " 25/30 over three consecutive days       Dates: Start: 06/01/22         Goal Note filed on 06/08/22 [id number] by Harpal Williamson MS,CCC-SLP       Pt's highest O-log score to date was 23/30                  Problem: Speech/Swallowing LTGs       Dates: Start: 06/01/22         Goal: LTG-By discharge, patient will solve basic problems in order to d/c home with SPV       Dates: Start: 06/01/22

## 2022-06-09 NOTE — THERAPY
Missed Therapy     Patient Name: Av Bob  Age:  77 y.o., Sex:  male  Medical Record #: [id number]  Today's Date: 6/9/2022    Discussed missed therapy with Dr. Pratt, PT, ST and . Per discussion with Dr. Pratt and primary PT, pt has ongoing and worsening medical issues and unable to functionally participate in OT this date.        06/09/22 1301   Therapy Missed   Missed Therapy (Minutes) 60   Reason For Missed Therapy Medical - Patient on Hold from Therapy;Medical - Patient not Able To Participate

## 2022-06-09 NOTE — THERAPY
Physical Therapy   Daily Treatment     Patient Name: Av Bob  Age:  77 y.o., Sex:  male  Medical Record #: [id number]  Today's Date: 6/9/2022     Precautions  Precautions: (P) Fall Risk, Swallow Precautions ( See Comments)  Comments: (P) L eric from previous CVA; cdiff rule out, poor OOB tolerance, wounds on buttocks, serial cast to LUE 6/8/22, positive cdiff    Subjective    Patient seen from 9-9:30 am and 10:30-11:00 am, in bed during both sessions, wife present for sessions.     Objective       06/09/22 0931   Precautions   Precautions Fall Risk;Swallow Precautions ( See Comments)   Comments L eric from previous CVA; cdiff rule out, poor OOB tolerance, wounds on buttocks, serial cast to LUE 6/8/22, positive cdiff   Pain 0 - 10 Group   Location Coccyx   Location Orientation Posterior   Bed Mobility    Rolling Maximal Assist to Rt.;Maximum Assist to Lt.   Interdisciplinary Plan of Care Collaboration   IDT Collaboration with  Family / Caregiver;Nursing;Occupational Therapist;Physician;Therapy Tech   Patient Position at End of Therapy In Bed;Call Light within Reach;Tray Table within Reach;Phone within Reach;Family / Friend in Room   Collaboration Comments CLOF, med hold   Therapy Missed   Missed Therapy (Minutes) 30   Reason For Missed Therapy Medical - Patient on Hold from Therapy  (medical issues, managing cdiff)   PT Total Time Spent   PT Individual Total Time Spent (Mins) 60   PT Charge Group   PT Therapeutic Activities 4     LLE tone management and ROM, performing hip IR/ER rhythmic rotation x2 minutes. Patient positioned in neutral ankle dorsiflexion with PRAFO.    Serial casting management, with no areas of redness noted. Patient able to move all digits comfortably, no reports of numbness, tingling, or pain. Extra cast padding added to proximal edge of cast to ensure intact skin, photo taken to demonstrate proper positioning of LUE with cast using pillow.      Assessment    Patient tolerated  session fairly, continues to be limited by medical issues however good tolerance to serial casting. Continue monitoring cast. During first session, patient confused and emotionally labile/tearful during session, requiring reorientation from therapist as well as wife.    Strengths: Able to follow instructions, Motivated for self care and independence, Pleasant and cooperative, Supportive family, Willingly participates in therapeutic activities  Barriers: Bladder incontinence, Decreased endurance, Hemiparesis, Impaired activity tolerance, Impaired balance, Limited mobility    Plan    Bed mobility training, transfer training (squat pivot currently), standing tolerance/balance, Function in Sitting Test, gait training (currently using R wall rail), ROM/stretching, neuro re-ed for L hemibody, encourage anterior weight shifting/leaning, serial casting to LUE applied on 6/8/22 (monitor skin/circulation, patient comfort).     Passport items to be completed:  Get in/out of bed safely, in/out of a vehicle, safely use mobility device, walk or wheel around home/community, navigate up and down stairs, show how to get up/down from the ground, ensure home is accessible, demonstrate HEP, complete caregiver training      Physical Therapy Problems (Active)       Problem: Mobility       Dates: Start: 06/01/22         Goal: STG-Within one week, patient will propel wheelchair household distances 50 ft with Min A.       Dates: Start: 06/01/22               Problem: Mobility Transfers       Dates: Start: 06/01/22         Goal: STG-Within one week, patient will perform bed mobility with Mod A and bed functions as needed.       Dates: Start: 06/01/22            Goal: STG-Within one week, patient will transfer bed to chair with Max Ax1 via squat/stand pivot.       Dates: Start: 06/01/22               Problem: PT-Long Term Goals       Dates: Start: 06/01/22         Goal: LTG-By discharge, patient will propel wheelchair 50 ft mod I.        Dates: Start: 06/01/22            Goal: LTG-By discharge, patient will ambulate 50 ft with LRAD and Min A.       Dates: Start: 06/01/22            Goal: LTG-By discharge, patient will transfer one surface to another with CGA and LRAD.       Dates: Start: 06/01/22            Goal: LTG-By discharge, patient will transfer in/out of a car with CGA and LRAD.       Dates: Start: 06/01/22            Goal: LTG-By discharge, patient will perform bed mobility independently.       Dates: Start: 06/01/22

## 2022-06-09 NOTE — PROGRESS NOTES
"Lantus not given - difficulty maintaining patients blood glucose reported by days - this evening patient is running in the 80\"s - see flow sheet for blood glucose readings.    "

## 2022-06-09 NOTE — CARE PLAN
Problem: Mobility  Goal: STG-Within one week, patient will propel wheelchair household distances 50 ft with Min A.  Outcome: Not Met     Problem: Mobility Transfers  Goal: STG-Within one week, patient will perform bed mobility with Mod A and bed functions as needed.  Outcome: Not Met  Goal: STG-Within one week, patient will transfer bed to chair with Max Ax1 via squat/stand pivot.  Outcome: Not Met     Problem: Balance  Goal: STG-Within one week, patient will tolerate Function in Sitting Test assessment.  Outcome: Discharged - Not Met  Note: Prioritizing mobility training for patient due to low activity tolerance

## 2022-06-09 NOTE — PROGRESS NOTES
Hospital Medicine Daily Progress Note      Chief Complaint:  Hypertension  Diabetes  Leukocytosis    Interval History:  Pt awake and alert but remains confused.  Extensive lab, microbiology, and imaging results reviewed and discussed.    Review of Systems  Review of Systems   Constitutional: Negative for chills and fever.   HENT: Negative.    Eyes: Negative.    Respiratory: Negative for cough and shortness of breath.    Cardiovascular: Negative for chest pain and palpitations.   Gastrointestinal: Positive for diarrhea. Negative for abdominal pain, nausea and vomiting.   Genitourinary:        +incontinent   Musculoskeletal: Negative.    Skin: Negative for itching and rash.   Endo/Heme/Allergies: Negative for polydipsia. Does not bruise/bleed easily.   Psychiatric/Behavioral: Positive for memory loss.        Physical Exam  Temp:  [36.4 °C (97.5 °F)-36.8 °C (98.3 °F)] 36.4 °C (97.5 °F)  Pulse:  [62-79] 77  Resp:  [18] 18  BP: (107-151)/(51-75) 138/74  SpO2:  [91 %-96 %] 91 %    Physical Exam  Vitals reviewed.   Constitutional:       General: He is not in acute distress.     Appearance: He is not ill-appearing, toxic-appearing or diaphoretic.   HENT:      Head: Normocephalic and atraumatic.      Right Ear: External ear normal.      Left Ear: External ear normal.      Nose: Nose normal.      Mouth/Throat:      Pharynx: Oropharynx is clear.   Eyes:      General:         Right eye: No discharge.         Left eye: No discharge.      Extraocular Movements: Extraocular movements intact.      Conjunctiva/sclera: Conjunctivae normal.   Cardiovascular:      Rate and Rhythm: Normal rate and regular rhythm.   Pulmonary:      Effort: Pulmonary effort is normal. No respiratory distress.      Breath sounds: Normal breath sounds. No wheezing.   Abdominal:      General: Bowel sounds are normal. There is no distension.      Tenderness: There is no abdominal tenderness. There is no guarding or rebound.   Musculoskeletal:      Cervical  back: Normal range of motion and neck supple.      Right lower leg: No edema.      Left lower leg: No edema.   Skin:     General: Skin is warm and dry.   Neurological:      Mental Status: He is disoriented.         Fluids    Intake/Output Summary (Last 24 hours) at 6/9/2022 1414  Last data filed at 6/8/2022 1600  Gross per 24 hour   Intake 240 ml   Output --   Net 240 ml       Laboratory  Recent Labs     06/08/22  0558 06/09/22  0532   WBC 17.9* 18.4*   RBC 3.91* 4.11*   HEMOGLOBIN 11.3* 11.7*   HEMATOCRIT 34.1* 36.1*   MCV 87.2 87.8   MCH 28.9 28.5   MCHC 33.1* 32.4*   RDW 45.3 45.8   PLATELETCT 475* 499*   MPV 9.6 9.7     Recent Labs     06/08/22  0558 06/09/22  0532   SODIUM 140 143   POTASSIUM 4.2 4.2   CHLORIDE 107 108   CO2 23 24   GLUCOSE 117* 133*   BUN 29* 32*   CREATININE 1.13 1.14   CALCIUM 8.2* 8.3*               Assessment/Plan  Alkaline phosphatase elevation  Assessment & Plan  U/S Abd mild hepatomegaly, exophytic interpolar left renal cyst identified on the recent CT is not well visualized on ultrasound, renal protocol MRI could be obtained if indicated, s/p cholecystectomy, mild bilateral hydronephrosis, no obstructive etiology identified, and nonobstructive right renal stone measuring 6 mm  Follow LFT's    Thrombocytosis  Assessment & Plan  Likely reactive  Follow Platelet counts  Check F/U labs in AM    Gout  Assessment & Plan  On Allopurinol    Hypophosphatemia  Assessment & Plan  Phosphorus levels corrected  Discontinue Neutra-Phos    Hypokalemia  Assessment & Plan  K+ repleted  Outpt meds include K+ supplement    Leukocytosis  Assessment & Plan  UA  WBC, UCx pending  C Dif positive  CXR +atx, start IS  KUB possible colitis  BCx x 2 NTGD  CT interval increase in size and density of an exophytic interpolar left renal cyst suggesting internal hemorrhage with surrounding small amount of perinephric fluid/hemorrhage, minimal/mild hydronephrosis (previously moderate to severe), urinary bladder  wall thickening is again noted, possibly somewhat accentuated by underdistention, there is nondependent air which could be related to recent catheterization however infection should be excluded, there is probable mild colonic wall thickening, suspect mild colitis  Will change Flagyl to PO Vanco as first line tx for C Dif  Continue empiric Rocephin pending UCx    Vitamin D insufficiency  Assessment & Plan  Vit D level 29  On supplementation    Azotemia  Assessment & Plan  BUN/Cr not improving w/ PO fluids  Start cautious IVF  Follow renal function    Essential hypertension, benign- (present on admission)  Assessment & Plan  On Norvasc, Losartan, and Toprol  Observe blood pressure trends  Monitor for orthostatic hypotension on Flomax    Benign prostatic hyperplasia with urinary obstruction- (present on admission)  Assessment & Plan  S/P TURP in March 2022 w/ Dr. Barry  Gross hematuria resolved w/ CBI x 24 hours on 6/8/22  Pt's wife reports that Urology told her to expect intermittent hematuria for months following TURP  But suspect bleeding may have been from left kidney hemorrhage (see CT Abd/Pelvis 6/8/22)  Needs  F/U    Normocytic anemia- (present on admission)  Assessment & Plan  Fe 20  Would hold off on starting supplements until GI issues resolved  Follow H/H    GERD with esophagitis- (present on admission)  Assessment & Plan  On Prilosec    Recurrent UTI- (present on admission)  Assessment & Plan  Pt self-caths as outpt and has h/o recurrent UTI  UCx 5/24/22 >100,000 Staph Aureus, completed Ancef 6/5/22  UCx 6/1/22 ,000 Candida, completed Fluconazole 6/6/22    Depression  Assessment & Plan  On Prozac    Hypomagnesemia- (present on admission)  Assessment & Plan  Discontinued MgOx for diarrhea  Mg++ repleted w/ MgSO4 x 2 doses  Outpt meds include Mg++ supplement    H/O Cerebrovascular accident (CVA) in adulthood- (present on admission)  Assessment & Plan  On Plavix and Lipitor    Type 2 diabetes mellitus,  with long-term current use of insulin (HCC)- (present on admission)  Assessment & Plan  HbA1c 6.6  Discontinue Glargine for hypoglycemic trends  Continue SSI  Outpt meds include Trulicity 3 mg weekly, Toujeo 5-12 units daily, and Humalog 5 units base and per SS    Full Code    Discussed w/ pt, wife, Staff, and Dr. Pratt

## 2022-06-09 NOTE — PROGRESS NOTES
Hazel from lab called to give positive cdiff results - Dr. Maria A Malcolm notified of results via [employer].

## 2022-06-10 ENCOUNTER — APPOINTMENT (OUTPATIENT)
Dept: RADIOLOGY | Facility: REHABILITATION | Age: 77
DRG: 092 | End: 2022-06-10
Attending: PHYSICAL MEDICINE & REHABILITATION
Payer: MEDICARE

## 2022-06-10 LAB
ALBUMIN SERPL BCP-MCNC: 2.5 G/DL (ref 3.2–4.9)
ALBUMIN/GLOB SERPL: 0.9 G/DL
ALP SERPL-CCNC: 200 U/L (ref 30–99)
ALT SERPL-CCNC: 14 U/L (ref 2–50)
ANION GAP SERPL CALC-SCNC: 12 MMOL/L (ref 7–16)
AST SERPL-CCNC: 26 U/L (ref 12–45)
BILIRUB SERPL-MCNC: 0.3 MG/DL (ref 0.1–1.5)
BUN SERPL-MCNC: 32 MG/DL (ref 8–22)
CALCIUM SERPL-MCNC: 8.2 MG/DL (ref 8.5–10.5)
CHLORIDE SERPL-SCNC: 108 MMOL/L (ref 96–112)
CO2 SERPL-SCNC: 21 MMOL/L (ref 20–33)
CREAT SERPL-MCNC: 1.12 MG/DL (ref 0.5–1.4)
ERYTHROCYTE [DISTWIDTH] IN BLOOD BY AUTOMATED COUNT: 45.6 FL (ref 35.9–50)
GFR SERPLBLD CREATININE-BSD FMLA CKD-EPI: 68 ML/MIN/1.73 M 2
GLOBULIN SER CALC-MCNC: 2.7 G/DL (ref 1.9–3.5)
GLUCOSE BLD STRIP.AUTO-MCNC: 119 MG/DL (ref 65–99)
GLUCOSE BLD STRIP.AUTO-MCNC: 160 MG/DL (ref 65–99)
GLUCOSE BLD STRIP.AUTO-MCNC: 191 MG/DL (ref 65–99)
GLUCOSE BLD STRIP.AUTO-MCNC: 233 MG/DL (ref 65–99)
GLUCOSE SERPL-MCNC: 115 MG/DL (ref 65–99)
HCT VFR BLD AUTO: 36.2 % (ref 42–52)
HGB BLD-MCNC: 11.7 G/DL (ref 14–18)
MCH RBC QN AUTO: 28.1 PG (ref 27–33)
MCHC RBC AUTO-ENTMCNC: 32.3 G/DL (ref 33.7–35.3)
MCV RBC AUTO: 87 FL (ref 81.4–97.8)
MISCELLANEOUS LAB RESULT MISCLAB: ABNORMAL
PLATELET # BLD AUTO: 446 K/UL (ref 164–446)
PMV BLD AUTO: 9.7 FL (ref 9–12.9)
POTASSIUM SERPL-SCNC: 3.6 MMOL/L (ref 3.6–5.5)
PROT SERPL-MCNC: 5.2 G/DL (ref 6–8.2)
RBC # BLD AUTO: 4.16 M/UL (ref 4.7–6.1)
SODIUM SERPL-SCNC: 141 MMOL/L (ref 135–145)
WBC # BLD AUTO: 11.7 K/UL (ref 4.8–10.8)

## 2022-06-10 PROCEDURE — 700105 HCHG RX REV CODE 258: Performed by: HOSPITALIST

## 2022-06-10 PROCEDURE — 700105 HCHG RX REV CODE 258: Performed by: PHYSICAL MEDICINE & REHABILITATION

## 2022-06-10 PROCEDURE — 99232 SBSQ HOSP IP/OBS MODERATE 35: CPT | Performed by: PHYSICAL MEDICINE & REHABILITATION

## 2022-06-10 PROCEDURE — 97530 THERAPEUTIC ACTIVITIES: CPT

## 2022-06-10 PROCEDURE — 97112 NEUROMUSCULAR REEDUCATION: CPT

## 2022-06-10 PROCEDURE — 700101 HCHG RX REV CODE 250: Performed by: PHYSICAL MEDICINE & REHABILITATION

## 2022-06-10 PROCEDURE — A9270 NON-COVERED ITEM OR SERVICE: HCPCS | Performed by: PHYSICAL MEDICINE & REHABILITATION

## 2022-06-10 PROCEDURE — 770010 HCHG ROOM/CARE - REHAB SEMI PRIVAT*

## 2022-06-10 PROCEDURE — 97129 THER IVNTJ 1ST 15 MIN: CPT

## 2022-06-10 PROCEDURE — 700102 HCHG RX REV CODE 250 W/ 637 OVERRIDE(OP): Performed by: HOSPITALIST

## 2022-06-10 PROCEDURE — A9270 NON-COVERED ITEM OR SERVICE: HCPCS | Performed by: HOSPITALIST

## 2022-06-10 PROCEDURE — 74018 RADEX ABDOMEN 1 VIEW: CPT

## 2022-06-10 PROCEDURE — 80053 COMPREHEN METABOLIC PANEL: CPT

## 2022-06-10 PROCEDURE — 97130 THER IVNTJ EA ADDL 15 MIN: CPT

## 2022-06-10 PROCEDURE — 99232 SBSQ HOSP IP/OBS MODERATE 35: CPT | Performed by: HOSPITALIST

## 2022-06-10 PROCEDURE — 700111 HCHG RX REV CODE 636 W/ 250 OVERRIDE (IP): Performed by: PHYSICAL MEDICINE & REHABILITATION

## 2022-06-10 PROCEDURE — 36415 COLL VENOUS BLD VENIPUNCTURE: CPT

## 2022-06-10 PROCEDURE — 700102 HCHG RX REV CODE 250 W/ 637 OVERRIDE(OP): Performed by: PHYSICAL MEDICINE & REHABILITATION

## 2022-06-10 PROCEDURE — 82962 GLUCOSE BLOOD TEST: CPT | Mod: 91

## 2022-06-10 PROCEDURE — 97535 SELF CARE MNGMENT TRAINING: CPT

## 2022-06-10 PROCEDURE — 85027 COMPLETE CBC AUTOMATED: CPT

## 2022-06-10 PROCEDURE — 97110 THERAPEUTIC EXERCISES: CPT

## 2022-06-10 RX ORDER — SODIUM CHLORIDE 9 MG/ML
1000 INJECTION, SOLUTION INTRAVENOUS ONCE
Status: COMPLETED | OUTPATIENT
Start: 2022-06-10 | End: 2022-06-10

## 2022-06-10 RX ADMIN — VANCOMYCIN HYDROCHLORIDE 125 MG: KIT ORAL at 05:40

## 2022-06-10 RX ADMIN — ALLOPURINOL 100 MG: 100 TABLET ORAL at 05:40

## 2022-06-10 RX ADMIN — Medication 1000 UNITS: at 09:11

## 2022-06-10 RX ADMIN — OXYCODONE 5 MG: 5 TABLET ORAL at 13:51

## 2022-06-10 RX ADMIN — BACLOFEN 5 MG: 10 TABLET ORAL at 16:54

## 2022-06-10 RX ADMIN — TRAZODONE HYDROCHLORIDE 50 MG: 50 TABLET ORAL at 20:56

## 2022-06-10 RX ADMIN — FLUOXETINE 40 MG: 20 CAPSULE ORAL at 09:12

## 2022-06-10 RX ADMIN — OMEPRAZOLE 20 MG: 20 CAPSULE, DELAYED RELEASE ORAL at 09:19

## 2022-06-10 RX ADMIN — BACLOFEN 5 MG: 10 TABLET ORAL at 20:56

## 2022-06-10 RX ADMIN — LIDOCAINE 1 PATCH: 50 PATCH TOPICAL at 13:23

## 2022-06-10 RX ADMIN — AMLODIPINE BESYLATE 10 MG: 5 TABLET ORAL at 05:40

## 2022-06-10 RX ADMIN — LOSARTAN POTASSIUM 50 MG: 25 TABLET, FILM COATED ORAL at 09:12

## 2022-06-10 RX ADMIN — BACLOFEN 5 MG: 10 TABLET ORAL at 09:12

## 2022-06-10 RX ADMIN — ATORVASTATIN CALCIUM 80 MG: 40 TABLET, FILM COATED ORAL at 20:56

## 2022-06-10 RX ADMIN — OXYCODONE 5 MG: 5 TABLET ORAL at 09:11

## 2022-06-10 RX ADMIN — VANCOMYCIN HYDROCHLORIDE 125 MG: KIT ORAL at 18:30

## 2022-06-10 RX ADMIN — CEFTRIAXONE SODIUM 1 G: 1 INJECTION, POWDER, FOR SOLUTION INTRAMUSCULAR; INTRAVENOUS at 16:54

## 2022-06-10 RX ADMIN — TAMSULOSIN HYDROCHLORIDE 0.4 MG: 0.4 CAPSULE ORAL at 09:12

## 2022-06-10 RX ADMIN — LOPERAMIDE HYDROCHLORIDE 2 MG: 2 CAPSULE ORAL at 20:56

## 2022-06-10 RX ADMIN — VANCOMYCIN HYDROCHLORIDE 125 MG: KIT ORAL at 13:23

## 2022-06-10 RX ADMIN — SODIUM CHLORIDE 1000 ML: 9 INJECTION, SOLUTION INTRAVENOUS at 13:43

## 2022-06-10 RX ADMIN — CLOPIDOGREL 75 MG: 75 TABLET, FILM COATED ORAL at 05:40

## 2022-06-10 ASSESSMENT — ENCOUNTER SYMPTOMS
DIARRHEA: 1
EYES NEGATIVE: 1
SHORTNESS OF BREATH: 0
COUGH: 0
FEVER: 0
MUSCULOSKELETAL NEGATIVE: 1
BRUISES/BLEEDS EASILY: 0
MEMORY LOSS: 1
NAUSEA: 0
CHILLS: 0
POLYDIPSIA: 0
VOMITING: 0
PALPITATIONS: 0
ABDOMINAL PAIN: 0

## 2022-06-10 ASSESSMENT — ACTIVITIES OF DAILY LIVING (ADL)
TOILETING_LEVEL_OF_ASSIST_DESCRIPTION: ASSIST TO PULL PANTS UP;ASSIST TO PULL PANTS DOWN;ASSIST FOR HYGIENE
BED_CHAIR_WHEELCHAIR_TRANSFER_DESCRIPTION: INCREASED TIME;INITIAL PREPARATION FOR TASK;SQUAT PIVOT TRANSFER TO WHEELCHAIR;SUPERVISION FOR SAFETY;VERBAL CUEING
BED_CHAIR_WHEELCHAIR_TRANSFER_DESCRIPTION: SQUAT PIVOT TRANSFER TO WHEELCHAIR
BED_CHAIR_WHEELCHAIR_TRANSFER_DESCRIPTION: INCREASED TIME;INITIAL PREPARATION FOR TASK;SET-UP OF EQUIPMENT;SQUAT PIVOT TRANSFER TO WHEELCHAIR;SUPERVISION FOR SAFETY;VERBAL CUEING

## 2022-06-10 NOTE — THERAPY
Occupational Therapy  Daily Treatment     Patient Name: Av Bob  Age:  77 y.o., Sex:  male  Medical Record #: [id number]  Today's Date: 6/10/2022     Precautions  Precautions: Fall Risk, Swallow Precautions ( See Comments)  Comments: L eric from previous CVA poor OOB tolerance, wounds on buttocks, serial cast to LUE 6/8/22, positive cdiff    Safety   ADL Safety : Requires Physical Assist for Safety (2 person)    Subjective    Pt received supine in bed, agreeable to OT session, c/o L abdominal pain which subsided once pt in upright position     Objective       06/10/22 1401   OT Charge Group   OT Therapy Activity 2   OT Total Time Spent   OT Individual Total Time Spent (Mins) 30   Functional Level of Assist   Bed, Chair, Wheelchair Transfer Total Assist X 2   Bed Chair Wheelchair Transfer Description Squat pivot transfer to wheelchair  (to R)   Balance   Comments sit to stands with RUE on bedrail to pt's R, mod A to stand, min to mod A once up to maintain upright posture, tolerated 20 sec and 15 sec trials       Assessment    Pt tolerated session fair, c/o abdominal pain in supine but this resolved when sitting up, initial c/o back pain sitting up edge of bed which resolved as well. Required increased assist this session for transfer to w/c (max A x2 due to fatigue), was able to tolerate sit to stands with mod A and standing trials x2. Pt more alert and reported feeling better once up in chair.     Strengths: Motivated for self care and independence, Pleasant and cooperative, Supportive family, Willingly participates in therapeutic activities  Barriers: Confused, Decreased endurance, Fatigue, Generalized weakness, Impaired activity tolerance, Impaired balance, Impaired functional cognition, Limited mobility    Plan    Monitor LUE (skin/circulation, comfort) following serial casting (applied on 6/8).  Pressure sore prevention/mgmt, Attention to task, ADLs, rolling, 1 step directions, anterior weight shift  L and R for progression of functional transfers, seated balance, STS,     Passport items to be completed:  Perform bathroom transfers, complete dressing, complete feeding, get ready for the day, prepare a simple meal, participate in household tasks, adapt home for safety needs, demonstrate home exercise program, complete caregiver training     Occupational Therapy Goals (Active)       Problem: Bathing       Dates: Start: 06/01/22         Goal: STG-Within one week, patient will bathe w/ max A using DME as needed.        Dates: Start: 06/01/22         Goal Note filed on 06/09/22 1142 by Selene Elliott OT       Pt requires Total A                 Problem: Dressing       Dates: Start: 06/01/22         Goal: STG-Within one week, patient will dress UB w/ mod A.       Dates: Start: 06/01/22         Goal Note filed on 06/09/22 1142 by Selene Elliott OT       Pt requires Max A              Goal: STG-Within one week, patient will dress LB w/ mod A.        Dates: Start: 06/01/22         Goal Note filed on 06/09/22 1142 by Selene Elliott OT       Pt requires Total Ax 2 in bed and from w/c level                 Problem: Functional Transfers       Dates: Start: 06/01/22         Goal: STG-Within one week, patient will transfer to toilet w/ max A using DME as needed.       Dates: Start: 06/01/22         Goal Note filed on 06/09/22 1142 by Selene Elliott OT       Pt requires Total x2              Goal: STG-Within one week, patient will transfer to step in shower with max A using DME as needed.       Dates: Start: 06/01/22         Goal Note filed on 06/09/22 1142 by Selene Elliott OT       Pt requires Total x2 for walk in shower                 Problem: OT Long Term Goals       Dates: Start: 06/01/22         Goal: LTG-By discharge, patient will complete basic self care tasks with min A using DME and AE as needed.       Dates: Start: 06/01/22            Goal: LTG-By discharge, patient will perform  bathroom transfers w/ min A using DME and AE as needed.       Dates: Start: 06/01/22               Problem: Toileting       Dates: Start: 06/01/22         Goal: STG-Within one week, patient will complete toileting tasks w/ max A using DME as needed.        Dates: Start: 06/01/22         Goal Note filed on 06/09/22 1142 by Selene Elliott OT       Pt requires Total x2

## 2022-06-10 NOTE — CARE PLAN
Problem: Knowledge Deficit - Standard  Goal: Patient and family/care givers will demonstrate understanding of plan of care, disease process/condition, diagnostic tests and medications  Outcome: Progressing     Problem: Skin Integrity  Goal: Skin integrity is maintained or improved  Outcome: Progressing     Problem: Fall Risk - Rehab  Goal: Patient will remain free from falls  Outcome: Progressing

## 2022-06-10 NOTE — THERAPY
Occupational Therapy  Daily Treatment     Patient Name: Av Bob  Age:  77 y.o., Sex:  male  Medical Record #: [id number]  Today's Date: 6/10/2022     Precautions  Precautions: (P) Fall Risk, Swallow Precautions ( See Comments)  Comments: (P) L eric from previous CVA poor OOB tolerance, wounds on buttocks, serial cast to LUE 6/8/22, positive cdiff    Safety   ADL Safety : Requires Physical Assist for Safety (2 person)    Subjective    Pt resting in bed, agreeable to OT.      Objective       06/10/22 1101   OT Charge Group   OT Neuromuscular Re-education / Balance 1   OT Therapeutic Exercise  1   OT Total Time Spent   OT Individual Total Time Spent (Mins) 30   Precautions   Precautions Fall Risk;Swallow Precautions ( See Comments)   Comments L eric from previous CVA poor OOB tolerance, wounds on buttocks, serial cast to LUE 6/8/22, positive cdiff   Cognition    Level of Consciousness Alert   Supine Upper Body Exercises   Chest Press 2 sets of 10;Right;Weight (See Comments for lbs)  (2# weight)   Sitting Upper Body Exercises   Internal Shoulder Rotation 2 sets of 10;Right ;Weight (See Comments for lbs)   External Shoulder Rotation 2 sets of 10;Right ;Weight (See Comments for lbs)   Bicep Curls 2 sets of 10;Right ;Weight (See Comments for lbs)   Comments seated EOB, 3# weight   Balance   Sitting Balance (Static) Fair -   Sitting Balance (Dynamic) Poor +   Weight Shift Sitting Poor   Comments seated EOB, reaching across midline with RUE in all planes for cones and stacking on R side, 2x10 reps for dynamic sitting balance, core strengthening, postural stability.   Bed Mobility    Supine to Sit Maximal Assist   Sit to Supine Maximal Assist   Scooting Maximal Assist   Rolling Moderate Assist to Rt.;Moderate Assist to Lt.   Skilled Intervention Verbal Cuing   Interdisciplinary Plan of Care Collaboration   IDT Collaboration with  Nursing;Family / Caregiver;Physical Therapist   Patient Position at End of Therapy  In Bed;Call Light within Reach;Tray Table within Reach;Family / Friend in Room   Collaboration Comments CLOF, POC       Assessment    Pt tolerated OT session well, improved ability to participate in therapy. He con't to require max A for bed mobility. Pt progressing with sitting tolerance/balance EOB during UE exercises and dynamic reaching activities. Skin/circulation of LUE monitored from serial casting, and no areas of concern at this time.   Strengths: Motivated for self care and independence, Pleasant and cooperative, Supportive family, Willingly participates in therapeutic activities  Barriers: Confused, Decreased endurance, Fatigue, Generalized weakness, Impaired activity tolerance, Impaired balance, Impaired functional cognition, Limited mobility    Plan    Monitor LUE (skin/circulation, comfort) following serial casting (applied on 6/8).  Pressure sore prevention/mgmt, Attention to task, ADLs, rolling, 1 step directions, anterior weight shift L and R for progression of functional transfers, seated balance, STS,     Passport items to be completed:  Perform bathroom transfers, complete dressing, complete feeding, get ready for the day, prepare a simple meal, participate in household tasks, adapt home for safety needs, demonstrate home exercise program, complete caregiver training        Occupational Therapy Goals (Active)       Problem: Bathing       Dates: Start: 06/01/22         Goal: STG-Within one week, patient will bathe w/ max A using DME as needed.        Dates: Start: 06/01/22         Goal Note filed on 06/09/22 1142 by Selene Elliott OT       Pt requires Total A                 Problem: Dressing       Dates: Start: 06/01/22         Goal: STG-Within one week, patient will dress UB w/ mod A.       Dates: Start: 06/01/22         Goal Note filed on 06/09/22 1142 by Selene Elliott OT       Pt requires Max A              Goal: STG-Within one week, patient will dress LB w/ mod A.        Dates:  Start: 06/01/22         Goal Note filed on 06/09/22 1142 by Selene Elliott OT       Pt requires Total Ax 2 in bed and from w/c level                 Problem: Functional Transfers       Dates: Start: 06/01/22         Goal: STG-Within one week, patient will transfer to toilet w/ max A using DME as needed.       Dates: Start: 06/01/22         Goal Note filed on 06/09/22 1142 by Selene Elliott OT       Pt requires Total x2              Goal: STG-Within one week, patient will transfer to step in shower with max A using DME as needed.       Dates: Start: 06/01/22         Goal Note filed on 06/09/22 1142 by Selene Elliott OT       Pt requires Total x2 for walk in shower                 Problem: OT Long Term Goals       Dates: Start: 06/01/22         Goal: LTG-By discharge, patient will complete basic self care tasks with min A using DME and AE as needed.       Dates: Start: 06/01/22            Goal: LTG-By discharge, patient will perform bathroom transfers w/ min A using DME and AE as needed.       Dates: Start: 06/01/22               Problem: Toileting       Dates: Start: 06/01/22         Goal: STG-Within one week, patient will complete toileting tasks w/ max A using DME as needed.        Dates: Start: 06/01/22         Goal Note filed on 06/09/22 1142 by Selene Elliott OT       Pt requires Total x2

## 2022-06-10 NOTE — THERAPY
Physical Therapy   Daily Treatment     Patient Name: Av Bob  Age:  77 y.o., Sex:  male  Medical Record #: [id number]  Today's Date: 6/10/2022     Precautions  Precautions: (P) Fall Risk, Swallow Precautions ( See Comments)  Comments: (P) L eric from previous CVA; cdiff rule out, poor OOB tolerance, wounds on buttocks, serial cast to LUE 6/8/22, positive cdiff    Subjective    Patient in bed and agreeable to therapy.     Objective       06/10/22 0931   PT Charge Group   PT Therapeutic Activities 4   PT Total Time Spent   PT Individual Total Time Spent (Mins) 60   Precautions   Precautions Fall Risk;Swallow Precautions ( See Comments)   Comments L eric from previous CVA; cdiff rule out, poor OOB tolerance, wounds on buttocks, serial cast to LUE 6/8/22, positive cdiff   Pain 0 - 10 Group   Location Leg   Location Orientation Left   Transfer Functional Level of Assist   Bed, Chair, Wheelchair Transfer Total Assist X 2  (Max Ax1, Min Ax1)   Bed Chair Wheelchair Transfer Description Increased time;Initial preparation for task;Squat pivot transfer to wheelchair;Supervision for safety;Verbal cueing  (fluctuates with fatigue, difficulty weight shifting forward)   Bed Mobility    Supine to Sit Maximal Assist   Sit to Supine Maximal Assist   Sit to Stand Maximal Assist   Scooting Maximal Assist   Rolling Moderate Assist to Rt.;Moderate Assist to Lt.   Interdisciplinary Plan of Care Collaboration   IDT Collaboration with  Certified Nursing Assistant;Family / Caregiver;Nursing;Occupational Therapist;Physician   Patient Position at End of Therapy In Bed;Call Light within Reach;Tray Table within Reach;Phone within Reach;Family / Friend in Room  (CNA present)   Collaboration Comments CLOF, serial casting plan     Rolling multiple times L/R with bed rails for incontinence management and LB dressing.    Squat pivot transfers bed<>w/c, patient with improved ability to weight shift anteriorly to facilitate  transfers.    Static standing with Max-Mod A to prevent posterior LOB, RUE support on bed rail x60 seconds.    Skin/circulation check performed on LUE; patient continues to report no discomfort with cast, no areas of concern at this time.    Assessment    Patient tolerated session well, improved spirits and motivation to participate. Able to tolerate OOB transfers, with no discomfort in w/c reported. Encouraged patient and family to continue monitoring casts over weekend, with plan to remove cast early next week.    Strengths: Able to follow instructions, Motivated for self care and independence, Pleasant and cooperative, Supportive family, Willingly participates in therapeutic activities  Barriers: Bladder incontinence, Decreased endurance, Hemiparesis, Impaired activity tolerance, Impaired balance, Limited mobility    Plan    Bed mobility training, transfer training (squat pivot currently), standing tolerance/balance, Function in Sitting Test, gait training (currently using R wall rail), ROM/stretching, neuro re-ed for L hemibody, encourage anterior weight shifting/leaning, serial casting to LUE applied on 6/8/22 (**please monitor skin/circulation, patient comfort**); plan to remove and recast early next week.     Passport items to be completed:  Get in/out of bed safely, in/out of a vehicle, safely use mobility device, walk or wheel around home/community, navigate up and down stairs, show how to get up/down from the ground, ensure home is accessible, demonstrate HEP, complete caregiver training      Physical Therapy Problems (Active)       Problem: Mobility       Dates: Start: 06/01/22         Goal: STG-Within one week, patient will propel wheelchair household distances 50 ft with Min A.       Dates: Start: 06/01/22               Problem: Mobility Transfers       Dates: Start: 06/01/22         Goal: STG-Within one week, patient will perform bed mobility with Mod A and bed functions as needed.       Dates: Start:  06/01/22            Goal: STG-Within one week, patient will transfer bed to chair with Max Ax1 via squat/stand pivot.       Dates: Start: 06/01/22               Problem: PT-Long Term Goals       Dates: Start: 06/01/22         Goal: LTG-By discharge, patient will propel wheelchair 50 ft mod I.       Dates: Start: 06/01/22            Goal: LTG-By discharge, patient will ambulate 50 ft with LRAD and Min A.       Dates: Start: 06/01/22            Goal: LTG-By discharge, patient will transfer one surface to another with CGA and LRAD.       Dates: Start: 06/01/22            Goal: LTG-By discharge, patient will transfer in/out of a car with CGA and LRAD.       Dates: Start: 06/01/22            Goal: LTG-By discharge, patient will perform bed mobility independently.       Dates: Start: 06/01/22

## 2022-06-10 NOTE — PROGRESS NOTES
Hospital Medicine Daily Progress Note      Chief Complaint:  Hypertension  Diabetes  Leukocytosis    Interval History:  In much better spirits today, denies new complaints, ate nearly 100% of breakfast.  Labs and microbiology reviewed.    Review of Systems  Review of Systems   Constitutional: Negative for chills and fever.   HENT: Negative.    Eyes: Negative.    Respiratory: Negative for cough and shortness of breath.    Cardiovascular: Negative for chest pain and palpitations.   Gastrointestinal: Positive for diarrhea. Negative for abdominal pain, nausea and vomiting.   Genitourinary:        +incontinent   Musculoskeletal: Negative.    Skin: Negative for itching and rash.   Endo/Heme/Allergies: Negative for polydipsia. Does not bruise/bleed easily.   Psychiatric/Behavioral: Positive for memory loss.        Physical Exam  Temp:  [36.5 °C (97.7 °F)-36.7 °C (98 °F)] 36.7 °C (98 °F)  Pulse:  [66-84] 66  Resp:  [18] 18  BP: (110-151)/(58-79) 124/67  SpO2:  [91 %] 91 %    Physical Exam  Vitals reviewed.   Constitutional:       General: He is not in acute distress.     Appearance: Normal appearance. He is not ill-appearing.   HENT:      Head: Normocephalic and atraumatic.      Right Ear: External ear normal.      Left Ear: External ear normal.      Nose: Nose normal.      Mouth/Throat:      Pharynx: Oropharynx is clear.   Eyes:      General:         Right eye: No discharge.         Left eye: No discharge.      Extraocular Movements: Extraocular movements intact.      Conjunctiva/sclera: Conjunctivae normal.   Cardiovascular:      Rate and Rhythm: Normal rate and regular rhythm.   Pulmonary:      Effort: Pulmonary effort is normal. No respiratory distress.      Breath sounds: Normal breath sounds. No wheezing.   Abdominal:      General: Bowel sounds are normal. There is no distension.      Tenderness: There is no abdominal tenderness. There is no guarding or rebound.   Musculoskeletal:      Cervical back: Normal range of  motion and neck supple.      Right lower leg: No edema.      Left lower leg: No edema.   Skin:     General: Skin is warm and dry.   Neurological:      Mental Status: He is alert.      Comments: Awake, alert, and appropriate         Fluids    Intake/Output Summary (Last 24 hours) at 6/10/2022 1242  Last data filed at 6/10/2022 1200  Gross per 24 hour   Intake 360 ml   Output --   Net 360 ml       Laboratory  Recent Labs     06/08/22  0558 06/09/22  0532 06/10/22  0610   WBC 17.9* 18.4* 11.7*   RBC 3.91* 4.11* 4.16*   HEMOGLOBIN 11.3* 11.7* 11.7*   HEMATOCRIT 34.1* 36.1* 36.2*   MCV 87.2 87.8 87.0   MCH 28.9 28.5 28.1   MCHC 33.1* 32.4* 32.3*   RDW 45.3 45.8 45.6   PLATELETCT 475* 499* 446   MPV 9.6 9.7 9.7     Recent Labs     06/08/22  0558 06/09/22  0532 06/10/22  0610   SODIUM 140 143 141   POTASSIUM 4.2 4.2 3.6   CHLORIDE 107 108 108   CO2 23 24 21   GLUCOSE 117* 133* 115*   BUN 29* 32* 32*   CREATININE 1.13 1.14 1.12   CALCIUM 8.2* 8.3* 8.2*               Assessment/Plan  Alkaline phosphatase elevation  Assessment & Plan  U/S Abd mild hepatomegaly, exophytic interpolar left renal cyst identified on the recent CT is not well visualized on ultrasound, renal protocol MRI could be obtained if indicated, s/p cholecystectomy, mild bilateral hydronephrosis, no obstructive etiology identified, and nonobstructive right renal stone measuring 6 mm  Follow LFT's    Thrombocytosis  Assessment & Plan  Likely reactive  Follow Platelet counts    Gout  Assessment & Plan  On Allopurinol    Hypokalemia  Assessment & Plan  K+ repleted  Outpt meds include K+ supplement    Leukocytosis  Assessment & Plan  UA  WBC, UCx still pending  C Dif positive  CXR +atx, continue IS  KUB possible colitis  BCx x 2 NTGD  CT interval increase in size and density of an exophytic interpolar left renal cyst suggesting internal hemorrhage with surrounding small amount of perinephric fluid/hemorrhage, minimal/mild hydronephrosis (previously moderate  to severe), urinary bladder wall thickening is again noted, possibly somewhat accentuated by underdistention, there is nondependent air which could be related to recent catheterization however infection should be excluded, there is probable mild colonic wall thickening, suspect mild colitis  Continue PO Vanco x 10 days for C Dif  Continue empiric Rocephin pending UCx  WBC improving    Vitamin D insufficiency  Assessment & Plan  Vit D level 29  On supplementation    Azotemia  Assessment & Plan  BUN/Cr not improved w/ PO fluids  Continue cautious IVF  Request BNP and Echo (last Echo 11/20/18 EF 60%)  Monitor PVR for obstruction, may need Michaels  Follow renal function    Essential hypertension, benign- (present on admission)  Assessment & Plan  On Norvasc, Losartan, and Toprol  Observe blood pressure trends  Monitor for orthostatic hypotension on Flomax    Benign prostatic hyperplasia with urinary obstruction- (present on admission)  Assessment & Plan  S/P TURP in March 2022 w/ Dr. Barry  Gross hematuria resolved w/ CBI x 24 hours on 6/8/22  Pt's wife reports that Urology told her to expect intermittent hematuria for months following TURP  But suspect bleeding may have been from left kidney hemorrhage (see CT Abd/Pelvis 6/8/22)  Needs  F/U    Normocytic anemia- (present on admission)  Assessment & Plan  Fe 20  Would hold off on starting supplements until GI issues resolved  Follow H/H    GERD with esophagitis- (present on admission)  Assessment & Plan  On Prilosec    Recurrent UTI- (present on admission)  Assessment & Plan  Pt self-caths as outpt and has h/o recurrent UTI  UCx 5/24/22 >100,000 Staph Aureus, completed Ancef 6/5/22  UCx 6/1/22 ,000 Candida, completed Fluconazole 6/6/22    Depression  Assessment & Plan  On Prozac    Hypomagnesemia- (present on admission)  Assessment & Plan  Discontinued MgOx for diarrhea  Mg++ repleted w/ MgSO4 x 2 doses  Outpt meds include Mg++ supplement    H/O Cerebrovascular  accident (CVA) in adulthood- (present on admission)  Assessment & Plan  On Plavix and Lipitor    Type 2 diabetes mellitus, with long-term current use of insulin (HCC)- (present on admission)  Assessment & Plan  HbA1c 6.6  Discontinued Glargine for hypoglycemic trends  Continue SSI  Outpt meds include Trulicity 3 mg weekly, Toujeo 5-12 units daily, and Humalog 5 units base and per SS    Full Code

## 2022-06-10 NOTE — CARE PLAN
Problem: Fall Risk - Rehab  Goal: Patient will remain free from falls  Note: Patient remains free from falls this shift. Patient was educated on using the call light to prevent falls. Patients bed is in the lowest position. The patients belongings are placed in near proximity to the patient.       Problem: Pain - Standard  Goal: Alleviation of pain or a reduction in pain to the patient’s comfort goal  Flowsheets (Taken 6/9/2022 1945)  Non Verbal Scale:   Tense Body Language   Grimacing   Crying   Moaning  Pain Rating Scale (NPRS): 10  Note: Patient able to verbalize pain level and verbalize an acceptable level of pain.    The patient is Stable - Low risk of patient condition declining or worsening

## 2022-06-10 NOTE — THERAPY
Speech Language Pathology  Daily Treatment     Patient Name: Av Bob  Age:  77 y.o., Sex:  male  Medical Record #: [id number]  Today's Date: 6/10/2022     Precautions  Precautions: Fall Risk, Swallow Precautions ( See Comments)  Comments: L eric from previous CVA poor OOB tolerance, wounds on buttocks, serial cast to LUE 6/8/22, positive cdiff    Subjective    Pt reported 10/10 lower back pain when ST arrived.  Nursing informed, and provided medication.      Objective       06/10/22 0901   Treatment Charges   SLP Cognitive Skill Development First 15 Minutes 1   SLP Cognitive Skill Development Additional 15 Minutes 1   SLP Total Time Spent   SLP Individual Total Time Spent (Mins) 30   Cognition   Orientation  Moderate (3)   Functional Memory Activities Moderate (3)   Interdisciplinary Plan of Care Collaboration   IDT Collaboration with  Nursing   Collaboration Comments 10/10 back pain.       Assessment    Pt scored 21/30 on the O-log (improved from 12/30).  Pt required min-mod verbal cues to recall daily events.  Pain was a barrier.  Pt repositioned in bed, and pain decreased.          Plan    O-log, memory book.         Speech Therapy Problems (Active)       Problem: Memory STGs       Dates: Start: 06/01/22         Goal: STG-Within one week, patient will remember safety precautions related to hospitalization with 75% accuracy.        Dates: Start: 06/01/22         Goal Note filed on 06/08/22 1452 by Harpal Williamson MS,CCC-SLP       Mod-max A for pt to recall new information related to his current hospitalization                  Problem: Problem Solving STGs       Dates: Start: 06/01/22         Goal: STG-Within one week, patient will complete o-log with a final score of 25/30 over three consecutive days       Dates: Start: 06/01/22         Goal Note filed on 06/08/22 1452 by Harpal Williamson MS,CCC-SLP       Pt's highest O-log score to date was 23/30                  Problem: Speech/Swallowing LTGs        Dates: Start: 06/01/22         Goal: LTG-By discharge, patient will solve basic problems in order to d/c home with SPV       Dates: Start: 06/01/22

## 2022-06-10 NOTE — PROGRESS NOTES
Rehab Progress Note     Encounter Date: 6/10/2022    CC: Decreased mobility, confusion    Interval Events (Subjective)  Patient sitting up in room. He reports he is feeling better today. He is having some left sided abdominal pain. Discussed would check KUB but could be colitis. Repeat labs have leukocytosis much lower and with stable Cr. Per hospitalist would also like to check echo.     IDT Team Meeting 6/9/2022  DC/Disposition:  6/21/22    Objective:  VITAL SIGNS: /67   Pulse 66   Temp 36.7 °C (98 °F) (Temporal)   Resp 18   Ht 1.829 m (6')   Wt 85.5 kg (188 lb 7.9 oz)   SpO2 91%   BMI 25.56 kg/m²   Gen: NAD  Psych: Mood and affect appropriate  CV: RRR, no edema  Resp: CTAB, no upper airway sounds  Abd: NTND  Neuro: AOx4, following commands    Recent Results (from the past 72 hour(s))   POCT glucose device results    Collection Time: 06/07/22  5:25 PM   Result Value Ref Range    POC Glucose, Blood 148 (H) 65 - 99 mg/dL   POCT glucose device results    Collection Time: 06/07/22  9:14 PM   Result Value Ref Range    POC Glucose, Blood 158 (H) 65 - 99 mg/dL   CBC WITHOUT DIFFERENTIAL    Collection Time: 06/08/22  5:58 AM   Result Value Ref Range    WBC 17.9 (H) 4.8 - 10.8 K/uL    RBC 3.91 (L) 4.70 - 6.10 M/uL    Hemoglobin 11.3 (L) 14.0 - 18.0 g/dL    Hematocrit 34.1 (L) 42.0 - 52.0 %    MCV 87.2 81.4 - 97.8 fL    MCH 28.9 27.0 - 33.0 pg    MCHC 33.1 (L) 33.7 - 35.3 g/dL    RDW 45.3 35.9 - 50.0 fL    Platelet Count 475 (H) 164 - 446 K/uL    MPV 9.6 9.0 - 12.9 fL   Comp Metabolic Panel    Collection Time: 06/08/22  5:58 AM   Result Value Ref Range    Sodium 140 135 - 145 mmol/L    Potassium 4.2 3.6 - 5.5 mmol/L    Chloride 107 96 - 112 mmol/L    Co2 23 20 - 33 mmol/L    Anion Gap 10.0 7.0 - 16.0    Glucose 117 (H) 65 - 99 mg/dL    Bun 29 (H) 8 - 22 mg/dL    Creatinine 1.13 0.50 - 1.40 mg/dL    Calcium 8.2 (L) 8.5 - 10.5 mg/dL    AST(SGOT) 40 12 - 45 U/L    ALT(SGPT) 14 2 - 50 U/L    Alkaline Phosphatase  192 (H) 30 - 99 U/L    Total Bilirubin 0.3 0.1 - 1.5 mg/dL    Albumin 2.6 (L) 3.2 - 4.9 g/dL    Total Protein 5.1 (L) 6.0 - 8.2 g/dL    Globulin 2.5 1.9 - 3.5 g/dL    A-G Ratio 1.0 g/dL   MAGNESIUM    Collection Time: 06/08/22  5:58 AM   Result Value Ref Range    Magnesium 1.4 (L) 1.5 - 2.5 mg/dL   PHOSPHORUS    Collection Time: 06/08/22  5:58 AM   Result Value Ref Range    Phosphorus 3.2 2.5 - 4.5 mg/dL   IRON/TOTAL IRON BIND    Collection Time: 06/08/22  5:58 AM   Result Value Ref Range    Iron 20 (L) 50 - 180 ug/dL    Total Iron Binding 155 (L) 250 - 450 ug/dL    Unsat Iron Binding 135 110 - 370 ug/dL    % Saturation 13 (L) 15 - 55 %   ESTIMATED GFR    Collection Time: 06/08/22  5:58 AM   Result Value Ref Range    GFR (CKD-EPI) 68 >60 mL/min/1.73 m 2   POCT glucose device results    Collection Time: 06/08/22  7:21 AM   Result Value Ref Range    POC Glucose, Blood 110 (H) 65 - 99 mg/dL   BLOOD CULTURE    Collection Time: 06/08/22  9:59 AM    Specimen: Peripheral; Blood   Result Value Ref Range    Significant Indicator NEG     Source BLD     Site PERIPHERAL     Culture Result       No Growth  Note: Blood cultures are incubated for 5 days and  are monitored continuously.Positive blood cultures  are called to the RN and reported as soon as  they are identified.     BLOOD CULTURE    Collection Time: 06/08/22  9:59 AM    Specimen: Peripheral; Blood   Result Value Ref Range    Significant Indicator NEG     Source BLD     Site PERIPHERAL     Culture Result       No Growth  Note: Blood cultures are incubated for 5 days and  are monitored continuously.Positive blood cultures  are called to the RN and reported as soon as  they are identified.     POCT glucose device results    Collection Time: 06/08/22 11:16 AM   Result Value Ref Range    POC Glucose, Blood 133 (H) 65 - 99 mg/dL   URINALYSIS    Collection Time: 06/08/22  2:24 PM    Specimen: Urine, Michaels Cath   Result Value Ref Range    Color DK Yellow     Character Cloudy  (A)     Specific Gravity 1.018 <1.035    Ph 5.0 5.0 - 8.0    Glucose Negative Negative mg/dL    Ketones Negative Negative mg/dL    Protein 30 (A) Negative mg/dL    Bilirubin Negative Negative    Urobilinogen, Urine 0.2 Negative    Nitrite Negative Negative    Leukocyte Esterase Large (A) Negative    Occult Blood Large (A) Negative    Micro Urine Req Microscopic    C Diff by PCR rflx Toxin    Collection Time: 06/08/22  2:24 PM    Specimen: Stool   Result Value Ref Range    C Diff by PCR Positive Negative    [id number] Presumptive Negative Negative   URINE MICROSCOPIC (W/UA)    Collection Time: 06/08/22  2:24 PM   Result Value Ref Range    WBC  (A) /hpf    RBC  (A) /hpf    Bacteria Negative None /hpf    Epithelial Cells Negative /hpf   POCT glucose device results    Collection Time: 06/08/22  5:16 PM   Result Value Ref Range    POC Glucose, Blood 89 65 - 99 mg/dL   POCT glucose device results    Collection Time: 06/08/22  9:26 PM   Result Value Ref Range    POC Glucose, Blood 87 65 - 99 mg/dL   POCT glucose device results    Collection Time: 06/09/22  1:35 AM   Result Value Ref Range    POC Glucose, Blood 82 65 - 99 mg/dL   MAGNESIUM    Collection Time: 06/09/22  5:32 AM   Result Value Ref Range    Magnesium 1.8 1.5 - 2.5 mg/dL   PHOSPHORUS    Collection Time: 06/09/22  5:32 AM   Result Value Ref Range    Phosphorus 3.8 2.5 - 4.5 mg/dL   CBC WITHOUT DIFFERENTIAL    Collection Time: 06/09/22  5:32 AM   Result Value Ref Range    WBC 18.4 (H) 4.8 - 10.8 K/uL    RBC 4.11 (L) 4.70 - 6.10 M/uL    Hemoglobin 11.7 (L) 14.0 - 18.0 g/dL    Hematocrit 36.1 (L) 42.0 - 52.0 %    MCV 87.8 81.4 - 97.8 fL    MCH 28.5 27.0 - 33.0 pg    MCHC 32.4 (L) 33.7 - 35.3 g/dL    RDW 45.8 35.9 - 50.0 fL    Platelet Count 499 (H) 164 - 446 K/uL    MPV 9.7 9.0 - 12.9 fL   Comp Metabolic Panel    Collection Time: 06/09/22  5:32 AM   Result Value Ref Range    Sodium 143 135 - 145 mmol/L    Potassium 4.2 3.6 - 5.5 mmol/L    Chloride 108  96 - 112 mmol/L    Co2 24 20 - 33 mmol/L    Anion Gap 11.0 7.0 - 16.0    Glucose 133 (H) 65 - 99 mg/dL    Bun 32 (H) 8 - 22 mg/dL    Creatinine 1.14 0.50 - 1.40 mg/dL    Calcium 8.3 (L) 8.5 - 10.5 mg/dL    AST(SGOT) 29 12 - 45 U/L    ALT(SGPT) 12 2 - 50 U/L    Alkaline Phosphatase 205 (H) 30 - 99 U/L    Total Bilirubin 0.3 0.1 - 1.5 mg/dL    Albumin 2.6 (L) 3.2 - 4.9 g/dL    Total Protein 5.5 (L) 6.0 - 8.2 g/dL    Globulin 2.9 1.9 - 3.5 g/dL    A-G Ratio 0.9 g/dL   PROCALCITONIN    Collection Time: 06/09/22  5:32 AM   Result Value Ref Range    Procalcitonin 0.10 <0.25 ng/mL   ESTIMATED GFR    Collection Time: 06/09/22  5:32 AM   Result Value Ref Range    GFR (CKD-EPI) 67 >60 mL/min/1.73 m 2   POCT glucose device results    Collection Time: 06/09/22  8:14 AM   Result Value Ref Range    POC Glucose, Blood 122 (H) 65 - 99 mg/dL   POCT glucose device results    Collection Time: 06/09/22 11:06 AM   Result Value Ref Range    POC Glucose, Blood 113 (H) 65 - 99 mg/dL   POCT glucose device results    Collection Time: 06/09/22  4:57 PM   Result Value Ref Range    POC Glucose, Blood 118 (H) 65 - 99 mg/dL   POCT glucose device results    Collection Time: 06/09/22  7:52 PM   Result Value Ref Range    POC Glucose, Blood 128 (H) 65 - 99 mg/dL   CBC WITHOUT DIFFERENTIAL    Collection Time: 06/10/22  6:10 AM   Result Value Ref Range    WBC 11.7 (H) 4.8 - 10.8 K/uL    RBC 4.16 (L) 4.70 - 6.10 M/uL    Hemoglobin 11.7 (L) 14.0 - 18.0 g/dL    Hematocrit 36.2 (L) 42.0 - 52.0 %    MCV 87.0 81.4 - 97.8 fL    MCH 28.1 27.0 - 33.0 pg    MCHC 32.3 (L) 33.7 - 35.3 g/dL    RDW 45.6 35.9 - 50.0 fL    Platelet Count 446 164 - 446 K/uL    MPV 9.7 9.0 - 12.9 fL   Comp Metabolic Panel    Collection Time: 06/10/22  6:10 AM   Result Value Ref Range    Sodium 141 135 - 145 mmol/L    Potassium 3.6 3.6 - 5.5 mmol/L    Chloride 108 96 - 112 mmol/L    Co2 21 20 - 33 mmol/L    Anion Gap 12.0 7.0 - 16.0    Glucose 115 (H) 65 - 99 mg/dL    Bun 32 (H) 8 - 22  mg/dL    Creatinine 1.12 0.50 - 1.40 mg/dL    Calcium 8.2 (L) 8.5 - 10.5 mg/dL    AST(SGOT) 26 12 - 45 U/L    ALT(SGPT) 14 2 - 50 U/L    Alkaline Phosphatase 200 (H) 30 - 99 U/L    Total Bilirubin 0.3 0.1 - 1.5 mg/dL    Albumin 2.5 (L) 3.2 - 4.9 g/dL    Total Protein 5.2 (L) 6.0 - 8.2 g/dL    Globulin 2.7 1.9 - 3.5 g/dL    A-G Ratio 0.9 g/dL   ESTIMATED GFR    Collection Time: 06/10/22  6:10 AM   Result Value Ref Range    GFR (CKD-EPI) 68 >60 mL/min/1.73 m 2   POCT glucose device results    Collection Time: 06/10/22  7:15 AM   Result Value Ref Range    POC Glucose, Blood 119 (H) 65 - 99 mg/dL   POCT glucose device results    Collection Time: 06/10/22 11:49 AM   Result Value Ref Range    POC Glucose, Blood 233 (H) 65 - 99 mg/dL       Current Facility-Administered Medications   Medication Frequency   • vancomycin 50 mg/mL oral soln 125 mg Q6HRS   • cefTRIAXone (Rocephin) 1 g in  mL IVPB Q24HRS   • Pharmacy Consult Request PHARMACY TO DOSE   • miconazole 2%-zinc oxide (Vinny) topical cream Q6HRS PRN   • insulin regular (HumuLIN R,NovoLIN R) injection 4X/DAY ACHS    And   • dextrose 50% (D50W) injection 25 g Q15 MIN PRN   • amLODIPine (NORVASC) tablet 10 mg Q DAY   • baclofen (LIORESAL) tablet 5 mg TID   • loperamide (IMODIUM) capsule 2 mg 4X/DAY PRN   • lidocaine (LIDODERM) 5 % 1 Patch Q24HR   • vitamin D3 (cholecalciferol) tablet 1,000 Units DAILY   • Respiratory Therapy Consult Continuous RT   • hydrALAZINE (APRESOLINE) tablet 25 mg Q8HRS PRN   • acetaminophen (Tylenol) tablet 650 mg Q4HRS PRN   • polyethylene glycol/lytes (MIRALAX) PACKET 1 Packet QDAY PRN    And   • magnesium hydroxide (MILK OF MAGNESIA) suspension 30 mL QDAY PRN    And   • bisacodyl (DULCOLAX) suppository 10 mg QDAY PRN   • omeprazole (PRILOSEC) capsule 20 mg DAILY   • artificial tears ophthalmic solution 1 Drop PRN   • benzocaine-menthol (Cepacol) lozenge 1 Lozenge Q2HRS PRN   • mag hydrox-al hydrox-simeth (MAALOX PLUS ES or MYLANTA DS)  suspension 20 mL Q2HRS PRN   • ondansetron (ZOFRAN ODT) dispertab 4 mg 4X/DAY PRN    Or   • ondansetron (ZOFRAN) syringe/vial injection 4 mg 4X/DAY PRN   • traZODone (DESYREL) tablet 50 mg QHS PRN   • sodium chloride (OCEAN) 0.65 % nasal spray 2 Spray PRN   • oxyCODONE immediate-release (ROXICODONE) tablet 5 mg Q3HRS PRN    Or   • oxyCODONE immediate release (ROXICODONE) tablet 10 mg Q3HRS PRN   • midazolam (VERSED) 5 mg/mL (1 mL vial) PRN   • acetaminophen (Tylenol) tablet 650 mg Q6HRS PRN   • allopurinol (ZYLOPRIM) tablet 100 mg QDAY   • atorvastatin (LIPITOR) tablet 80 mg Q EVENING   • clopidogrel (PLAVIX) tablet 75 mg QDAY   • FLUoxetine (PROZAC) capsule 40 mg DAILY   • gabapentin (NEURONTIN) capsule 100 mg QHS PRN   • losartan (COZAAR) tablet 50 mg BID   • metoprolol SR (TOPROL XL) tablet 100 mg Q EVENING   • tamsulosin (FLOMAX) capsule 0.4 mg DAILY       Orders Placed This Encounter   Procedures   • Diet Order Diet: Consistent CHO (Diabetic); Second Modifier: (optional): Cardiac     Standing Status:   Standing     Number of Occurrences:   1     Order Specific Question:   Diet:     Answer:   Consistent CHO (Diabetic) [4]     Order Specific Question:   Second Modifier: (optional)     Answer:   Cardiac [6]       Assessment:  Active Hospital Problems    Diagnosis    • *Acute encephalopathy    • Hyponatremia    • Dehydration    • Essential hypertension, benign    • Benign prostatic hyperplasia with urinary obstruction    • Diarrhea    • GERD with esophagitis    • Recurrent UTI    • Hypomagnesemia    • Type 2 diabetes mellitus, with long-term current use of insulin (HCC)    • Stage 3b chronic kidney disease (HCC)        Medical Decision Making and Plan:  Acute toxic encephalopathy - Patient with UTI with urosepsis s/p IV antibiotics. Patient has urinary retention and requires occasional catheterization putting him at risk for recurrent UTIs. With Decreased cognition, balance and strength in setting of previous  CVA  -PT and OT for mobility and ADLs  -SLP for cognition  -Follow-up with [facility]     Previous R CVA - Patient with contracture and spasticity on R side. On Plavix and statin  -Will start low dose Baclofen although most likely contracture. Serial casting to start 6/8, therapy holds otherwise for GI and leukocytosis    HTN/CAD - Patient on Plavix. Patient on Losartan 50 mg BID, Metoprolol 100 mg XL  -Consult Hospitalist. Recommending TTE     HLD - Patient on Atorvastatin 80 mg QHS     Leukocytosis - On treatment for UTI. On Ancef with recommendation to switch to Augment. Will check CBC in AM before switch  -Repeat 16.5 up from 16.1, consult Hospitalist. Improved to 12.7 on 6/5/22. Repeat 6/8 with Leukocytosis 17.9 and diffuse loose stools.   -Check C diff. Check UA. Check Blood cultures. KUB with distention and featureless colon. Discussed with hospitalist and start antibiotics including Flagyl. CT abdomen - possible small left cyst bleeding. Will monitor CBC. On Ceftriaxone. Improving leukocytosis 11.7 on 6/10/22    C Diff positive on 6/8. On Vancomycin PO. Improving     Anemia - Check AM CBC - 12.2 Repeat 11.3, Fe low but with GI issues holding on starting 6/8     DM2 with hyperglycemia - Patient on Glargine 11 U and SSI  -Consult hospitalist     Depression - Patient on Fluoxetine 40 mg daily      Hx of Gout - Patient on Allopurinol 100 mg daily     Urinary Retention - Patient requiring catheterization ~1 time daily. Follows with Urology. Continue Flomax  -Hematuria on 6/7 with significant amount. Hold Xarelto. Flush bladder.  Improved hematuria     Vitamin D Deficiency - 29 on admission. Start 1000 U     DVT Ppx - Patient on Xarelto ppx dose. On hold for bleeding.     Total time:  26 minutes.  I spent greater than 50% of the time for patient care, counseling, and coordination on this date, including unit/floor time, and face-to-face time with the patient as per interval events and assessment and plan  above. Topics discussed included improved leukocytosis, ongoing loose BMs, left sided abdominal pain, and check KUB.     Katheryn Pratt M.D.

## 2022-06-10 NOTE — THERAPY
Occupational Therapy  Daily Treatment     Patient Name: Av Bob  Age:  77 y.o., Sex:  male  Medical Record #: [id number]  Today's Date: 6/10/2022     Precautions  Precautions: (P) Fall Risk, Swallow Precautions ( See Comments)  Comments: (P) L eric from previous CVA poor OOB tolerance, wounds on buttocks, serial cast to LUE 6/8/22, positive cdiff    Safety   ADL Safety : Requires Physical Assist for Safety (2 person)    Subjective    Pt seated up in w/c, wincing/tearful, reports increased buttock pain while seated up in w/c.      Objective       06/10/22 1431   OT Charge Group   OT Self Care / ADL 1   OT Neuromuscular Re-education / Balance 1   OT Total Time Spent   OT Individual Total Time Spent (Mins) 30   Precautions   Precautions Fall Risk;Swallow Precautions ( See Comments)   Comments L eric from previous CVA poor OOB tolerance, wounds on buttocks, serial cast to LUE 6/8/22, positive cdiff   Cognition    Level of Consciousness Alert   Functional Level of Assist   Toileting Total Assist x 2   Toileting Description Assist to pull pants up;Assist to pull pants down;Assist for hygiene  (incontinent of bowel; 2 person assist for hygiene/brief change in supine via rolling)   Bed, Chair, Wheelchair Transfer Total Assist X 2   Bed Chair Wheelchair Transfer Description Increased time;Initial preparation for task;Set-up of equipment;Squat pivot transfer to wheelchair;Supervision for safety;Verbal cueing  (max A x1, min-mod A x1)   Balance   Sitting Balance (Static) Fair -   Sitting Balance (Dynamic) Poor +   Weight Shift Sitting Poor   Comments irma charles seated EOB for dynamic sitting balance, 3x10 with RUE, 3x10 kicking with RLE   Bed Mobility    Sit to Supine Maximal Assist   Scooting Maximal Assist   Rolling Moderate Assist to Rt.;Moderate Assist to Lt.   Skilled Intervention Verbal Cuing   Interdisciplinary Plan of Care Collaboration   IDT Collaboration with  Family / Caregiver;Certified Nursing  Assistant;Nursing   Patient Position at End of Therapy In Bed;Bed Alarm On;Call Light within Reach;Tray Table within Reach;Family / Friend in Room   Collaboration Comments CLOF, POC, IV mgt       Assessment    Pt with increased pain while seated up in w/c. He con't to require 2 person assist for safety with SQPT (max x1, min-mod A x1). He required brief change at end of session 2/2 bowel incontinence, barrier cream applied for skin protection.   Strengths: Motivated for self care and independence, Pleasant and cooperative, Supportive family, Willingly participates in therapeutic activities  Barriers: Confused, Decreased endurance, Fatigue, Generalized weakness, Impaired activity tolerance, Impaired balance, Impaired functional cognition, Limited mobility    Plan    Monitor LUE (skin/circulation, comfort) following serial casting (applied on 6/8).  Pressure sore prevention/mgmt, Attention to task, ADLs, rolling, 1 step directions, anterior weight shift L and R for progression of functional transfers, seated balance, STS,     Passport items to be completed:  Perform bathroom transfers, complete dressing, complete feeding, get ready for the day, prepare a simple meal, participate in household tasks, adapt home for safety needs, demonstrate home exercise program, complete caregiver training     Occupational Therapy Goals (Active)       Problem: Bathing       Dates: Start: 06/01/22         Goal: STG-Within one week, patient will bathe w/ max A using DME as needed.        Dates: Start: 06/01/22         Goal Note filed on 06/09/22 1142 by Selene Elliott OT       Pt requires Total A                 Problem: Dressing       Dates: Start: 06/01/22         Goal: STG-Within one week, patient will dress UB w/ mod A.       Dates: Start: 06/01/22         Goal Note filed on 06/09/22 1142 by Selene Elliott OT       Pt requires Max A              Goal: STG-Within one week, patient will dress LB w/ mod A.        Dates:  Start: 06/01/22         Goal Note filed on 06/09/22 1142 by Selene Elliott OT       Pt requires Total Ax 2 in bed and from w/c level                 Problem: Functional Transfers       Dates: Start: 06/01/22         Goal: STG-Within one week, patient will transfer to toilet w/ max A using DME as needed.       Dates: Start: 06/01/22         Goal Note filed on 06/09/22 1142 by Selene Elliott OT       Pt requires Total x2              Goal: STG-Within one week, patient will transfer to step in shower with max A using DME as needed.       Dates: Start: 06/01/22         Goal Note filed on 06/09/22 1142 by Selene Elliott OT       Pt requires Total x2 for walk in shower                 Problem: OT Long Term Goals       Dates: Start: 06/01/22         Goal: LTG-By discharge, patient will complete basic self care tasks with min A using DME and AE as needed.       Dates: Start: 06/01/22            Goal: LTG-By discharge, patient will perform bathroom transfers w/ min A using DME and AE as needed.       Dates: Start: 06/01/22               Problem: Toileting       Dates: Start: 06/01/22         Goal: STG-Within one week, patient will complete toileting tasks w/ max A using DME as needed.        Dates: Start: 06/01/22         Goal Note filed on 06/09/22 1142 by Selene Elliott OT       Pt requires Total x2

## 2022-06-11 ENCOUNTER — ANCILLARY PROCEDURE (OUTPATIENT)
Dept: CARDIOLOGY | Facility: REHABILITATION | Age: 77
DRG: 092 | End: 2022-06-11
Attending: HOSPITALIST
Payer: MEDICARE

## 2022-06-11 LAB
BACTERIA UR CULT: NORMAL
BUN SERPL-MCNC: 34 MG/DL (ref 8–22)
CREAT SERPL-MCNC: 1.07 MG/DL (ref 0.5–1.4)
GFR SERPLBLD CREATININE-BSD FMLA CKD-EPI: 72 ML/MIN/1.73 M 2
GLUCOSE BLD STRIP.AUTO-MCNC: 126 MG/DL (ref 65–99)
GLUCOSE BLD STRIP.AUTO-MCNC: 141 MG/DL (ref 65–99)
GLUCOSE BLD STRIP.AUTO-MCNC: 197 MG/DL (ref 65–99)
LV EJECT FRACT  99904: 60
LV EJECT FRACT MOD 2C 99903: 57.6
LV EJECT FRACT MOD 4C 99902: 64.79
LV EJECT FRACT MOD BP 99901: 61.24
NT-PROBNP SERPL IA-MCNC: 673 PG/ML (ref 0–125)
SIGNIFICANT IND 70042: NORMAL
SITE SITE: NORMAL
SOURCE SOURCE: NORMAL

## 2022-06-11 PROCEDURE — 36415 COLL VENOUS BLD VENIPUNCTURE: CPT

## 2022-06-11 PROCEDURE — 99232 SBSQ HOSP IP/OBS MODERATE 35: CPT | Performed by: HOSPITALIST

## 2022-06-11 PROCEDURE — 770010 HCHG ROOM/CARE - REHAB SEMI PRIVAT*

## 2022-06-11 PROCEDURE — A9270 NON-COVERED ITEM OR SERVICE: HCPCS | Performed by: HOSPITALIST

## 2022-06-11 PROCEDURE — 82962 GLUCOSE BLOOD TEST: CPT | Mod: 91

## 2022-06-11 PROCEDURE — 93306 TTE W/DOPPLER COMPLETE: CPT | Mod: 26 | Performed by: INTERNAL MEDICINE

## 2022-06-11 PROCEDURE — 84520 ASSAY OF UREA NITROGEN: CPT

## 2022-06-11 PROCEDURE — 700102 HCHG RX REV CODE 250 W/ 637 OVERRIDE(OP): Performed by: HOSPITALIST

## 2022-06-11 PROCEDURE — 97129 THER IVNTJ 1ST 15 MIN: CPT

## 2022-06-11 PROCEDURE — A9270 NON-COVERED ITEM OR SERVICE: HCPCS | Performed by: PHYSICAL MEDICINE & REHABILITATION

## 2022-06-11 PROCEDURE — 83880 ASSAY OF NATRIURETIC PEPTIDE: CPT

## 2022-06-11 PROCEDURE — 93306 TTE W/DOPPLER COMPLETE: CPT

## 2022-06-11 PROCEDURE — 97130 THER IVNTJ EA ADDL 15 MIN: CPT

## 2022-06-11 PROCEDURE — 700102 HCHG RX REV CODE 250 W/ 637 OVERRIDE(OP): Performed by: PHYSICAL MEDICINE & REHABILITATION

## 2022-06-11 PROCEDURE — 700101 HCHG RX REV CODE 250: Performed by: PHYSICAL MEDICINE & REHABILITATION

## 2022-06-11 PROCEDURE — 82565 ASSAY OF CREATININE: CPT

## 2022-06-11 RX ADMIN — LIDOCAINE 1 PATCH: 50 PATCH TOPICAL at 14:49

## 2022-06-11 RX ADMIN — VANCOMYCIN HYDROCHLORIDE 125 MG: KIT ORAL at 05:24

## 2022-06-11 RX ADMIN — AMLODIPINE BESYLATE 10 MG: 5 TABLET ORAL at 05:23

## 2022-06-11 RX ADMIN — OXYCODONE HYDROCHLORIDE 10 MG: 10 TABLET ORAL at 22:18

## 2022-06-11 RX ADMIN — VANCOMYCIN HYDROCHLORIDE 125 MG: KIT ORAL at 18:10

## 2022-06-11 RX ADMIN — CLOPIDOGREL 75 MG: 75 TABLET, FILM COATED ORAL at 05:24

## 2022-06-11 RX ADMIN — ATORVASTATIN CALCIUM 80 MG: 40 TABLET, FILM COATED ORAL at 21:05

## 2022-06-11 RX ADMIN — OMEPRAZOLE 20 MG: 20 CAPSULE, DELAYED RELEASE ORAL at 09:57

## 2022-06-11 RX ADMIN — BACLOFEN 5 MG: 10 TABLET ORAL at 14:48

## 2022-06-11 RX ADMIN — VANCOMYCIN HYDROCHLORIDE 125 MG: KIT ORAL at 00:06

## 2022-06-11 RX ADMIN — BACLOFEN 5 MG: 10 TABLET ORAL at 09:57

## 2022-06-11 RX ADMIN — TAMSULOSIN HYDROCHLORIDE 0.4 MG: 0.4 CAPSULE ORAL at 10:06

## 2022-06-11 RX ADMIN — VANCOMYCIN HYDROCHLORIDE 125 MG: KIT ORAL at 13:21

## 2022-06-11 RX ADMIN — ACETAMINOPHEN 650 MG: 325 TABLET ORAL at 14:48

## 2022-06-11 RX ADMIN — FLUOXETINE 40 MG: 20 CAPSULE ORAL at 09:57

## 2022-06-11 RX ADMIN — BACLOFEN 5 MG: 10 TABLET ORAL at 21:07

## 2022-06-11 RX ADMIN — ALLOPURINOL 100 MG: 100 TABLET ORAL at 05:24

## 2022-06-11 RX ADMIN — Medication 1000 UNITS: at 09:56

## 2022-06-11 ASSESSMENT — ENCOUNTER SYMPTOMS
FEVER: 0
COUGH: 0
POLYDIPSIA: 0
EYES NEGATIVE: 1
NAUSEA: 0
CHILLS: 0
MUSCULOSKELETAL NEGATIVE: 1
SHORTNESS OF BREATH: 0
BRUISES/BLEEDS EASILY: 0
DIARRHEA: 1
ABDOMINAL PAIN: 0
VOMITING: 0
PALPITATIONS: 0
MEMORY LOSS: 1

## 2022-06-11 NOTE — CARE PLAN
Problem: Fall Risk - Rehab  Goal: Patient will remain free from falls  Note: Patient remains free from falls this shift. Patient was educated on using the call light to prevent falls. Patients bed is in the lowest position. The patients belongings are placed in near proximity to the patient.       Problem: Pain - Standard  Goal: Alleviation of pain or a reduction in pain to the patient’s comfort goal  Flowsheets (Taken 6/10/2022 2000)  Non Verbal Scale: Calm  Pain Rating Scale (NPRS): 0  Note: Patient able to verbalize pain level and verbalize an acceptable level of pain.    The patient is Stable - Low risk of patient condition declining or worsening

## 2022-06-11 NOTE — THERAPY
Speech Language Pathology  Daily Treatment     Patient Name: Av Bob  Age:  77 y.o., Sex:  male  Medical Record #: [id number]  Today's Date: 6/11/2022     Precautions  Precautions: Fall Risk, Swallow Precautions ( See Comments)  Comments: L eric from previous CVA poor OOB tolerance, wounds on buttocks, serial cast to LUE 6/8/22, positive cdiff    Subjective    Pt pleasant and cooperative during tx.     Objective       06/11/22 0901   Treatment Charges   SLP Cognitive Skill Development First 15 Minutes 1   SLP Cognitive Skill Development Additional 15 Minutes 1   SLP Total Time Spent   SLP Individual Total Time Spent (Mins) 30   Cognition   Orientation  Moderate (3)   Functional Memory Activities Moderate (3)       Assessment    Pt scored 13/30 on the O-log this day (decreased from 21).  Pt required mod cues to accurately respond to orientation questions.  Pt required mod-max verbal cues to reall previous days' events.           Plan    Memory bbok, O-log        Speech Therapy Problems (Active)       Problem: Memory STGs       Dates: Start: 06/01/22         Goal: STG-Within one week, patient will remember safety precautions related to hospitalization with 75% accuracy.        Dates: Start: 06/01/22         Goal Note filed on 06/08/22 1452 by Harpal Williamson MS,CCC-SLP       Mod-max A for pt to recall new information related to his current hospitalization                  Problem: Problem Solving STGs       Dates: Start: 06/01/22         Goal: STG-Within one week, patient will complete o-log with a final score of 25/30 over three consecutive days       Dates: Start: 06/01/22         Goal Note filed on 06/08/22 1452 by Harpal Williamson MS,CCC-SLP       Pt's highest O-log score to date was 23/30                  Problem: Speech/Swallowing LTGs       Dates: Start: 06/01/22         Goal: LTG-By discharge, patient will solve basic problems in order to d/c home with SPV       Dates: Start: 06/01/22

## 2022-06-11 NOTE — PROGRESS NOTES
Hospital Medicine Daily Progress Note      Chief Complaint:  Hypertension  Diabetes  Leukocytosis    Interval History:  No 24 hour clinical changes.  Lab and microbiology results reviewed and discussed.    Review of Systems  Review of Systems   Constitutional: Negative for chills and fever.   HENT: Negative.    Eyes: Negative.    Respiratory: Negative for cough and shortness of breath.    Cardiovascular: Negative for chest pain and palpitations.   Gastrointestinal: Positive for diarrhea. Negative for abdominal pain, nausea and vomiting.   Genitourinary:        +incontinent   Musculoskeletal: Negative.    Skin: Negative for itching and rash.   Endo/Heme/Allergies: Negative for polydipsia. Does not bruise/bleed easily.   Psychiatric/Behavioral: Positive for memory loss.        Physical Exam  Temp:  [36.7 °C (98.1 °F)-37 °C (98.6 °F)] 37 °C (98.6 °F)  Pulse:  [72-87] 72  Resp:  [16-18] 16  BP: (113-142)/(64-83) 117/64  SpO2:  [92 %-96 %] 96 %    Physical Exam  Vitals reviewed.   Constitutional:       General: He is not in acute distress.     Appearance: Normal appearance. He is not ill-appearing.   HENT:      Head: Normocephalic and atraumatic.      Right Ear: External ear normal.      Left Ear: External ear normal.      Nose: Nose normal.      Mouth/Throat:      Pharynx: Oropharynx is clear.   Eyes:      General:         Right eye: No discharge.         Left eye: No discharge.      Extraocular Movements: Extraocular movements intact.      Conjunctiva/sclera: Conjunctivae normal.   Cardiovascular:      Rate and Rhythm: Normal rate and regular rhythm.   Pulmonary:      Effort: Pulmonary effort is normal. No respiratory distress.      Breath sounds: Normal breath sounds. No wheezing.   Abdominal:      General: Bowel sounds are normal. There is no distension.      Tenderness: There is no abdominal tenderness. There is no guarding or rebound.   Musculoskeletal:      Cervical back: Normal range of motion and neck supple.       Right lower leg: No edema.      Left lower leg: No edema.   Skin:     General: Skin is warm and dry.   Neurological:      Mental Status: He is alert.      Comments: Resting comfortably         Fluids    Intake/Output Summary (Last 24 hours) at 6/11/2022 1301  Last data filed at 6/11/2022 0900  Gross per 24 hour   Intake 240 ml   Output --   Net 240 ml       Laboratory  Recent Labs     06/09/22  0532 06/10/22  0610   WBC 18.4* 11.7*   RBC 4.11* 4.16*   HEMOGLOBIN 11.7* 11.7*   HEMATOCRIT 36.1* 36.2*   MCV 87.8 87.0   MCH 28.5 28.1   MCHC 32.4* 32.3*   RDW 45.8 45.6   PLATELETCT 499* 446   MPV 9.7 9.7     Recent Labs     06/09/22  0532 06/10/22  0610 06/11/22  0621   SODIUM 143 141  --    POTASSIUM 4.2 3.6  --    CHLORIDE 108 108  --    CO2 24 21  --    GLUCOSE 133* 115*  --    BUN 32* 32* 34*   CREATININE 1.14 1.12 1.07   CALCIUM 8.3* 8.2*  --                Assessment/Plan  Alkaline phosphatase elevation  Assessment & Plan  U/S Abd mild hepatomegaly, exophytic interpolar left renal cyst identified on the recent CT is not well visualized on ultrasound, renal protocol MRI could be obtained if indicated, s/p cholecystectomy, mild bilateral hydronephrosis, no obstructive etiology identified, and nonobstructive right renal stone measuring 6 mm  Follow LFT's  Check F/U labs in AM     Thrombocytosis  Assessment & Plan  Likely reactive  High Platelet counts resolved    Gout  Assessment & Plan  On Allopurinol    Hypokalemia  Assessment & Plan  K+ repleted  Outpt meds include K+ supplement    Leukocytosis  Assessment & Plan  UA  WBC, UCx negative  C Dif positive  CXR +atx, continue IS  KUB possible colitis  BCx x 2 NTGD  CT interval increase in size and density of an exophytic interpolar left renal cyst suggesting internal hemorrhage with surrounding small amount of perinephric fluid/hemorrhage, minimal/mild hydronephrosis (previously moderate to severe), urinary bladder wall thickening is again noted, possibly  somewhat accentuated by underdistention, there is nondependent air which could be related to recent catheterization however infection should be excluded, there is probable mild colonic wall thickening, suspect mild colitis  Continue PO Vanco x 10 days for C Dif  Will discontinue empiric Rocephin now that UCx has returned as no growth  WBC significantly improved    Vitamin D insufficiency  Assessment & Plan  Vit D level 29  On supplementation    Azotemia  Assessment & Plan  BUN/Cr improved w/ IVF  Renal function now stabilized  Echo 11/20/18 EF 60%, F/U Echo pending    Essential hypertension, benign- (present on admission)  Assessment & Plan  On Norvasc, Losartan, and Toprol  Observe blood pressure trends  Monitor for orthostatic hypotension on Flomax    Benign prostatic hyperplasia with urinary obstruction- (present on admission)  Assessment & Plan  S/P TURP in March 2022 w/ Dr. Barry  Gross hematuria resolved w/ CBI x 24 hours on 6/8/22  Pt's wife reports that Urology told her to expect intermittent hematuria for months following TURP  But suspect bleeding may have been from left kidney hemorrhage (see CT Abd/Pelvis 6/8/22)  Needs  F/U    Normocytic anemia- (present on admission)  Assessment & Plan  Fe 20  Would hold off on starting supplements until GI issues resolved  Follow H/H    GERD with esophagitis- (present on admission)  Assessment & Plan  On Prilosec    Recurrent UTI- (present on admission)  Assessment & Plan  Pt self-caths as outpt and has h/o recurrent UTI  UCx 5/24/22 >100,000 Staph Aureus, completed Ancef 6/5/22  UCx 6/1/22 ,000 Candida, completed Fluconazole 6/6/22    Depression  Assessment & Plan  On Prozac    Hypomagnesemia- (present on admission)  Assessment & Plan  Discontinued MgOx for diarrhea  Mg++ repleted w/ MgSO4 x 2 doses  Outpt meds include Mg++ supplement    H/O Cerebrovascular accident (CVA) in adulthood- (present on admission)  Assessment & Plan  On Plavix and Lipitor    Type 2  diabetes mellitus, with long-term current use of insulin ([facility])- (present on admission)  Assessment & Plan  HbA1c 6.6  Discontinued Glargine for hypoglycemic trends  Observe serum glucose trends on SSI  Outpt meds include Trulicity 3 mg weekly, Toujeo 5-12 units daily, and Humalog 5 units base and per SS    Full Code    Discussed w/ Charge RN (Becky) and Pharmacy ([facility])

## 2022-06-12 PROBLEM — A04.72 C. DIFFICILE COLITIS: Status: ACTIVE | Noted: 2022-06-01

## 2022-06-12 LAB
ALBUMIN SERPL BCP-MCNC: 2.2 G/DL (ref 3.2–4.9)
ALBUMIN/GLOB SERPL: 0.9 G/DL
ALP SERPL-CCNC: 195 U/L (ref 30–99)
ALT SERPL-CCNC: 10 U/L (ref 2–50)
ANION GAP SERPL CALC-SCNC: 11 MMOL/L (ref 7–16)
AST SERPL-CCNC: 23 U/L (ref 12–45)
BASOPHILS # BLD AUTO: 0.5 % (ref 0–1.8)
BASOPHILS # BLD: 0.05 K/UL (ref 0–0.12)
BILIRUB SERPL-MCNC: 0.3 MG/DL (ref 0.1–1.5)
BUN SERPL-MCNC: 28 MG/DL (ref 8–22)
CALCIUM SERPL-MCNC: 8 MG/DL (ref 8.5–10.5)
CHLORIDE SERPL-SCNC: 107 MMOL/L (ref 96–112)
CO2 SERPL-SCNC: 21 MMOL/L (ref 20–33)
CREAT SERPL-MCNC: 1.07 MG/DL (ref 0.5–1.4)
EOSINOPHIL # BLD AUTO: 0.35 K/UL (ref 0–0.51)
EOSINOPHIL NFR BLD: 3.6 % (ref 0–6.9)
ERYTHROCYTE [DISTWIDTH] IN BLOOD BY AUTOMATED COUNT: 44.5 FL (ref 35.9–50)
GFR SERPLBLD CREATININE-BSD FMLA CKD-EPI: 72 ML/MIN/1.73 M 2
GLOBULIN SER CALC-MCNC: 2.5 G/DL (ref 1.9–3.5)
GLUCOSE BLD STRIP.AUTO-MCNC: 134 MG/DL (ref 65–99)
GLUCOSE BLD STRIP.AUTO-MCNC: 141 MG/DL (ref 65–99)
GLUCOSE BLD STRIP.AUTO-MCNC: 151 MG/DL (ref 65–99)
GLUCOSE BLD STRIP.AUTO-MCNC: 169 MG/DL (ref 65–99)
GLUCOSE BLD STRIP.AUTO-MCNC: 179 MG/DL (ref 65–99)
GLUCOSE SERPL-MCNC: 134 MG/DL (ref 65–99)
HCT VFR BLD AUTO: 32.6 % (ref 42–52)
HGB BLD-MCNC: 10.4 G/DL (ref 14–18)
IMM GRANULOCYTES # BLD AUTO: 0.11 K/UL (ref 0–0.11)
IMM GRANULOCYTES NFR BLD AUTO: 1.1 % (ref 0–0.9)
LYMPHOCYTES # BLD AUTO: 1.07 K/UL (ref 1–4.8)
LYMPHOCYTES NFR BLD: 11 % (ref 22–41)
MAGNESIUM SERPL-MCNC: 1.3 MG/DL (ref 1.5–2.5)
MCH RBC QN AUTO: 28.1 PG (ref 27–33)
MCHC RBC AUTO-ENTMCNC: 31.9 G/DL (ref 33.7–35.3)
MCV RBC AUTO: 88.1 FL (ref 81.4–97.8)
MONOCYTES # BLD AUTO: 0.67 K/UL (ref 0–0.85)
MONOCYTES NFR BLD AUTO: 6.9 % (ref 0–13.4)
NEUTROPHILS # BLD AUTO: 7.52 K/UL (ref 1.82–7.42)
NEUTROPHILS NFR BLD: 76.9 % (ref 44–72)
NRBC # BLD AUTO: 0 K/UL
NRBC BLD-RTO: 0 /100 WBC
NT-PROBNP SERPL IA-MCNC: 494 PG/ML (ref 0–125)
PHOSPHATE SERPL-MCNC: 2.9 MG/DL (ref 2.5–4.5)
PLATELET # BLD AUTO: 419 K/UL (ref 164–446)
PMV BLD AUTO: 9.8 FL (ref 9–12.9)
POTASSIUM SERPL-SCNC: 3.6 MMOL/L (ref 3.6–5.5)
PROT SERPL-MCNC: 4.7 G/DL (ref 6–8.2)
RBC # BLD AUTO: 3.7 M/UL (ref 4.7–6.1)
SODIUM SERPL-SCNC: 139 MMOL/L (ref 135–145)
WBC # BLD AUTO: 9.8 K/UL (ref 4.8–10.8)

## 2022-06-12 PROCEDURE — 99232 SBSQ HOSP IP/OBS MODERATE 35: CPT | Performed by: HOSPITALIST

## 2022-06-12 PROCEDURE — 700111 HCHG RX REV CODE 636 W/ 250 OVERRIDE (IP): Performed by: HOSPITALIST

## 2022-06-12 PROCEDURE — A9270 NON-COVERED ITEM OR SERVICE: HCPCS | Performed by: PHYSICAL MEDICINE & REHABILITATION

## 2022-06-12 PROCEDURE — 770010 HCHG ROOM/CARE - REHAB SEMI PRIVAT*

## 2022-06-12 PROCEDURE — 97129 THER IVNTJ 1ST 15 MIN: CPT

## 2022-06-12 PROCEDURE — 85025 COMPLETE CBC W/AUTO DIFF WBC: CPT

## 2022-06-12 PROCEDURE — 82962 GLUCOSE BLOOD TEST: CPT

## 2022-06-12 PROCEDURE — 700102 HCHG RX REV CODE 250 W/ 637 OVERRIDE(OP): Performed by: HOSPITALIST

## 2022-06-12 PROCEDURE — 84100 ASSAY OF PHOSPHORUS: CPT

## 2022-06-12 PROCEDURE — A9270 NON-COVERED ITEM OR SERVICE: HCPCS | Performed by: HOSPITALIST

## 2022-06-12 PROCEDURE — 83880 ASSAY OF NATRIURETIC PEPTIDE: CPT

## 2022-06-12 PROCEDURE — 80053 COMPREHEN METABOLIC PANEL: CPT

## 2022-06-12 PROCEDURE — 97130 THER IVNTJ EA ADDL 15 MIN: CPT

## 2022-06-12 PROCEDURE — 36415 COLL VENOUS BLD VENIPUNCTURE: CPT

## 2022-06-12 PROCEDURE — 700102 HCHG RX REV CODE 250 W/ 637 OVERRIDE(OP): Performed by: PHYSICAL MEDICINE & REHABILITATION

## 2022-06-12 PROCEDURE — 83735 ASSAY OF MAGNESIUM: CPT

## 2022-06-12 RX ORDER — MAGNESIUM SULFATE HEPTAHYDRATE 40 MG/ML
2 INJECTION, SOLUTION INTRAVENOUS ONCE
Status: COMPLETED | OUTPATIENT
Start: 2022-06-12 | End: 2022-06-12

## 2022-06-12 RX ADMIN — Medication 1000 UNITS: at 09:10

## 2022-06-12 RX ADMIN — BACLOFEN 5 MG: 10 TABLET ORAL at 09:10

## 2022-06-12 RX ADMIN — AMLODIPINE BESYLATE 10 MG: 5 TABLET ORAL at 06:05

## 2022-06-12 RX ADMIN — VANCOMYCIN HYDROCHLORIDE 125 MG: KIT ORAL at 00:00

## 2022-06-12 RX ADMIN — DIBASIC SODIUM PHOSPHATE, MONOBASIC POTASSIUM PHOSPHATE AND MONOBASIC SODIUM PHOSPHATE 250 MG: 852; 155; 130 TABLET ORAL at 15:26

## 2022-06-12 RX ADMIN — VANCOMYCIN HYDROCHLORIDE 125 MG: KIT ORAL at 06:05

## 2022-06-12 RX ADMIN — DIBASIC SODIUM PHOSPHATE, MONOBASIC POTASSIUM PHOSPHATE AND MONOBASIC SODIUM PHOSPHATE 250 MG: 852; 155; 130 TABLET ORAL at 21:21

## 2022-06-12 RX ADMIN — TAMSULOSIN HYDROCHLORIDE 0.4 MG: 0.4 CAPSULE ORAL at 09:10

## 2022-06-12 RX ADMIN — BACLOFEN 5 MG: 10 TABLET ORAL at 21:21

## 2022-06-12 RX ADMIN — OXYCODONE 5 MG: 5 TABLET ORAL at 09:09

## 2022-06-12 RX ADMIN — MAGNESIUM SULFATE HEPTAHYDRATE 2 G: 2 INJECTION, SOLUTION INTRAVENOUS at 12:27

## 2022-06-12 RX ADMIN — VANCOMYCIN HYDROCHLORIDE 125 MG: KIT ORAL at 11:40

## 2022-06-12 RX ADMIN — LOSARTAN POTASSIUM 50 MG: 25 TABLET, FILM COATED ORAL at 09:10

## 2022-06-12 RX ADMIN — BACLOFEN 5 MG: 10 TABLET ORAL at 15:26

## 2022-06-12 RX ADMIN — CLOPIDOGREL 75 MG: 75 TABLET, FILM COATED ORAL at 06:04

## 2022-06-12 RX ADMIN — ATORVASTATIN CALCIUM 80 MG: 40 TABLET, FILM COATED ORAL at 21:20

## 2022-06-12 RX ADMIN — ALLOPURINOL 100 MG: 100 TABLET ORAL at 06:04

## 2022-06-12 RX ADMIN — METOPROLOL SUCCINATE 100 MG: 100 TABLET, FILM COATED, EXTENDED RELEASE ORAL at 21:21

## 2022-06-12 RX ADMIN — OMEPRAZOLE 20 MG: 20 CAPSULE, DELAYED RELEASE ORAL at 09:10

## 2022-06-12 RX ADMIN — FLUOXETINE 40 MG: 20 CAPSULE ORAL at 09:10

## 2022-06-12 RX ADMIN — DIBASIC SODIUM PHOSPHATE, MONOBASIC POTASSIUM PHOSPHATE AND MONOBASIC SODIUM PHOSPHATE 250 MG: 852; 155; 130 TABLET ORAL at 11:40

## 2022-06-12 RX ADMIN — LOSARTAN POTASSIUM 50 MG: 25 TABLET, FILM COATED ORAL at 21:20

## 2022-06-12 RX ADMIN — VANCOMYCIN HYDROCHLORIDE 125 MG: KIT ORAL at 17:23

## 2022-06-12 ASSESSMENT — PAIN DESCRIPTION - PAIN TYPE
TYPE: ACUTE PAIN

## 2022-06-12 ASSESSMENT — ENCOUNTER SYMPTOMS
FEVER: 0
PALPITATIONS: 0
BRUISES/BLEEDS EASILY: 0
COUGH: 0
MEMORY LOSS: 1
NAUSEA: 0
CHILLS: 0
EYES NEGATIVE: 1
DIARRHEA: 1
SHORTNESS OF BREATH: 0
ABDOMINAL PAIN: 0
VOMITING: 0
MUSCULOSKELETAL NEGATIVE: 1
POLYDIPSIA: 0

## 2022-06-12 NOTE — CARE PLAN
Problem: Skin Integrity  Goal: Skin integrity is maintained or improved  Outcome: Not Progressing  Wound care RN consulted on new open areas on coccyx. The area was noted after white zinc paste removed during bed bath. Wound RN took new photo and will continue to follow. He is on an Isoflex bed. Diarrhea/cdiff resolving. No BM noted today. Turn every 2 hours. Will Monitor.

## 2022-06-12 NOTE — THERAPY
Speech Language Pathology  Daily Treatment     Patient Name: Av Bob  Age:  77 y.o., Sex:  male  Medical Record #: [id number]  Today's Date: 6/12/2022     Precautions  Precautions: Fall Risk, Swallow Precautions ( See Comments)  Comments: L eric from previous CVA poor OOB tolerance, wounds on buttocks, serial cast to LUE 6/8/22, positive cdiff    Subjective    Pt pleasant and cooperative during tx.  Nursing reported some difficulty with pills this day, and asked if pills could be floated with puree.  This was confirmed.     Objective       06/12/22 1501   Treatment Charges   SLP Cognitive Skill Development First 15 Minutes 1   SLP Cognitive Skill Development Additional 15 Minutes 1   SLP Total Time Spent   SLP Individual Total Time Spent (Mins) 30   Cognition   Orientation  Moderate (3)   Functional Memory Activities Moderate (3)   Interdisciplinary Plan of Care Collaboration   IDT Collaboration with  Family / Caregiver   Collaboration Comments spouse present       Assessment    Pt scored 20/30 on the O-log (improved from 13).  Pt required mod verbal cues (choice of 2) to recall daily events.  Spouse demonstrated good ability to provide cues as necessary, to aid in recall.          Plan    Memory book, O-log.        Speech Therapy Problems (Active)       Problem: Memory STGs       Dates: Start: 06/01/22         Goal: STG-Within one week, patient will remember safety precautions related to hospitalization with 75% accuracy.        Dates: Start: 06/01/22         Goal Note filed on 06/08/22 1452 by Harpal Williamson MS,CCC-SLP       Mod-max A for pt to recall new information related to his current hospitalization                  Problem: Problem Solving STGs       Dates: Start: 06/01/22         Goal: STG-Within one week, patient will complete o-log with a final score of 25/30 over three consecutive days       Dates: Start: 06/01/22         Goal Note filed on 06/08/22 1452 by Harpal Williamson MS,CCC-SLP        Pt's highest O-log score to date was 23/30                  Problem: Speech/Swallowing LTGs       Dates: Start: 06/01/22         Goal: LTG-By discharge, patient will solve basic problems in order to d/c home with SPV       Dates: Start: 06/01/22

## 2022-06-12 NOTE — WOUND TEAM
[facility]  Inpatient Services  Initial Wound and Skin Care Evaluation    Admission Date: 5/31/2022     Last order of IP CONSULT TO WOUND CARE was found on 6/11/2022 from Hospital Encounter on 5/31/2022     HPI, PMH, SH: Reviewed    Past Surgical History:   Procedure Laterality Date   • WI INJ LUMBAR/SACRAL,W/ IMAGING  7/27/2021    Procedure: Lumbar L5-S1 interlaminar epidural steroid injection;  Surgeon: Harpal Bennett M.D.;  Location: [facility];  Service: Pain Management   • WI UPPER GI ENDOSCOPY,DIAGNOSIS N/A 7/21/2021    Procedure: GASTROSCOPY - AND DILATION;  Surgeon: Neville Huerta M.D.;  Location: SURGERY SAME DAY [facility];  Service: Gastroenterology   • WI UPPER GI ENDOSCOPY,BIOPSY N/A 7/21/2021    Procedure: GASTROSCOPY, WITH BIOPSY;  Surgeon: Neville Huerta M.D.;  Location: SURGERY SAME DAY [facility];  Service: Gastroenterology   • LUMBAR TRANSFORAMINAL EPIDURAL STEROID INJECTION Right 3/29/2021    Procedure: RIGHT L3-4 and L4-5 transforaminal epidural steroid injection with fluoroscopic guidance;  Surgeon: Harpal Bennett M.D.;  Location: [facility];  Service: Pain Management   • LUMBAR TRANSFORAMINAL EPIDURAL STEROID INJECTION Right 11/2/2020    Procedure: INJECTION, STEROID, SPINE, LUMBAR, EPIDURAL, TRANSFORAMINAL APPROACH;  Surgeon: Harpal Bennett M.D.;  Location: [facility];  Service: Pain Management   • CYSTOSCOPY STENT PLACEMENT Left 2/12/2018    Procedure: CYSTOSCOPY  ;  Surgeon: Rey Barry M.D.;  Location: [facility];  Service: Urology   • URETEROSCOPY Left 2/12/2018    Procedure: URETEROSCOPY- FLEXIBLE  ;  Surgeon: Rey Barry M.D.;  Location: [facility];  Service: Urology   • LASERTRIPSY Left 2/12/2018    Procedure: LASERTRIPSY - LITHO  ;  Surgeon: Rey Barry M.D.;  Location: [facility];  Service: Urology   • WOUND CLOSURE GENERAL  4/3/2012    Performed by GERHARD GILBERT  A at SURGERY SAME DAY [facility] ORS   • ZZZ CARDIAC CATH  2009    Stents to LAD, Om   • DAGOBERTO BY LAPAROSCOPY  1998   • ANGIOPLASTY  1997    RCA followed by other stents as noted above.    • ZZZ CARDIAC CATH  1997    stent RCA   • CATARACT EXTRACTION WITH IOL      bilateral   • LITHOTRIPSY     • TONSILLECTOMY     • TONSILLECTOMY AND ADENOIDECTOMY       Social History     Tobacco Use   • Smoking status: Never Smoker   • Smokeless tobacco: Never Used   Substance Use Topics   • Alcohol use: Never     No chief complaint on file.    Diagnosis: Acute encephalopathy [G93.40]    Unit where seen by Wound Team: 17/02     WOUND CONSULT/FOLLOW UP RELATED TO:  coccyx     WOUND HISTORY:  Injury found by nursing during bed bath. Pt on isolation for CDiff and loose Bms have been gradually resolving. Site is MAGDA, pt on isoflex bed.    WOUND ASSESSMENT/LDA        Wound 06/11/22 Pressure Injury Coccyx DTI (Active)   Wound Image   06/11/22 1800   Site Assessment Red;Purple 06/11/22 1800   Periwound Assessment Red 06/11/22 1800   Margins Defined edges 06/11/22 1800   Closure None 06/11/22 1800   Drainage Amount None 06/11/22 1800   Treatments Site care 06/11/22 1800   Wound Cleansing Foam Cleanser/Washcloth 06/11/22 1800   Periwound Protectant Moisture Barrier 06/11/22 1800   Dressing Cleansing/Solutions Not Applicable 06/11/22 1800   Dressing Options Open to Air 06/11/22 1800   Dressing Changed Observed 06/11/22 1800   Dressing Status Open to Air 06/11/22 1800   Dressing Change/Treatment Frequency Every Shift, and As Needed 06/11/22 1800   NEXT Dressing Change/Treatment Date 06/11/22 06/11/22 1800   NEXT Weekly Photo (Inpatient Only) 06/18/22 06/11/22 1800   Pressure Injury Stage DTPI 06/11/22 1800   Wound Length (cm) 3 cm 06/11/22 1800   Wound Width (cm) 3 cm 06/11/22 1800   Wound Depth (cm) 0 cm 06/11/22 1800   Wound Surface Area (cm^2) 9 cm^2 06/11/22 1800   Wound Volume (cm^3) 0 cm^3 06/11/22 1800   Wound Odor None 06/11/22 1800    Exposed Structures None 06/11/22 1800   Number of days: 0        Vascular:    PADMINI:   No results found.    Lab Values:    Lab Results   Component Value Date/Time    WBC 11.7 (H) 06/10/2022 06:10 AM    RBC 4.16 (L) 06/10/2022 06:10 AM    HEMOGLOBIN 11.7 (L) 06/10/2022 06:10 AM    HEMATOCRIT 36.2 (L) 06/10/2022 06:10 AM    CREACTPROT 0.89 (H) 01/19/2022 12:50 PM    SEDRATEWES 6 05/09/2011 04:22 PM    HBA1C 6.6 (H) 06/01/2022 06:30 AM        Culture Results show:  No results found for this or any previous visit (from the past 720 hour(s)).    Pain Level/Medicated:  NA       INTERVENTIONS BY WOUND TEAM:  Chart and images reviewed. Discussed with bedside RN. All areas of concern (based on picture review, LDA review and discussion with bedside RN) have been thoroughly assessed. Documentation of areas based on significant findings. This RN in to assess patient. Performed standard wound care which includes appropriate positioning, dressing removal and non-selective debridement. Pictures and measurements obtained weekly if/when required.  Preparation for Dressing removal: NA  Non-selectively Debrided with: none  Sharp debridement: NA  Magaly wound: nursing to apply barrier paste when finished with bed bath.  Primary Dressing:   Secondary (Outer) Dressing:     Interdisciplinary consultation: Patient, Bedside RN, Gila wound RN, Mindi Wound PT    EVALUATION / RATIONALE FOR TREATMENT:  Most Recent Date:  6/11: Small DTI found on coccyx and only partial thickness skin loss at this time. Will order stoma powder and barrier paste applications to decrease moisture to area and allow wound to begin evolving. Wound team to continue following.     Goals: Steady decrease in wound area and depth weekly.    WOUND TEAM PLAN OF CARE ([X] for frequency of wound follow up,):   Nursing to follow dressing orders written for wound care. Contact wound team if area fails to progress, deteriorates or with any questions/concerns if something comes up  before next scheduled follow up (See below as to whether wound is following and frequency of wound follow up)  Dressing changes by wound team:                   Follow up 3 times weekly:                NPWT change 3 times weekly:     Follow up 1-2 times weekly:    x  Follow up Bi-Monthly:                   Follow up as needed:     Other (explain):     NURSING PLAN OF CARE ORDERS (X):  Dressing changes: See Dressing Care orders: x  Skin care: See Skin Care orders:   RN Prevention Protocol:   Rectal tube care: See Rectal Tube Care orders:   Other orders:    RSKIN:   CURRENTLY IN PLACE (X), APPLIED THIS VISIT (A), ORDERED (O):   Q shift Andrea:  X  Q shift pressure point assessments:  X    Surface/Positioning   Pressure redistribution mattress            Low Airloss    X isoflex      Bariatric foam      Bariatric PERCY     Waffle cushion        Waffle Overlay          Reposition q 2 hours      TAPs Turning system     Z Yeison Pillow     Offloading/Redistribution   Sacral Mepilex (Silicone dressing)   NA- until no longer incontinent of stool  Heel Mepilex (Silicone dressing)     O    Heel float boots (Prevalon boot)             Float Heels off Bed with Pillows           Respiratory   Silicone O2 tubing         Gray Foam Ear protectors     Cannula fixation Device (Tender )          High flow offloading Clip    Elastic head band offloading device      Anchorfast                                                         Trach with Optifoam split foam             Containment/Moisture Prevention     Rectal tube or BMS    Purwick/Condom Cath        Michaels Catheter    Barrier wipes    O       Barrier paste  O     Antifungal tx      Interdry        Mobilization       Up to chair        Ambulate      PT/OT  x    Nutrition       Dietician        Diabetes Education      PO  x   TF     TPN     NPO   # days     Other        Anticipated discharge plans:   LTACH:        SNF/Rehab:                  Home Health Care:    x       Outpatient  Wound Center:            Self/Family Care:        Other:

## 2022-06-12 NOTE — CARE PLAN
With serial cast to Left upper extremities , placed on 6/8/22 . Sacral area wound cleansed with moist warm washcloth and foam soap, stoma powder sprinkled over  wound , dust off extra powder ,, Barrier paste applied over the wound area followed with Viscopaste  And mepilex.    Problem: Psychosocial  Goal: Patient's level of anxiety will decrease  Outcome: Progressing  Note: Pt calm and cooperative , no s/s of anxiety noted.     Problem: Skin Integrity  Goal: Patient's skin integrity will be maintained or improve  Outcome: Progressing  Note: Sacral wound dressing done,[ see wound care order] Turned and repositioned to sides  .     Problem: Pain - Standard  Goal: Alleviation of pain or a reduction in pain to the patient’s comfort goal  Outcome: Progressing  Note: Assessed for pain and discomfort, medicated with oxycodone for back pain with relief[ see mar] .  .   The patient is Watcher - Medium risk of patient condition declining or worsening    Shift Goals  Clinical Goals: safety  Patient Goals: rest comfortably    Progress made toward(s) clinical / shift goals: Pt free from fall and injury.

## 2022-06-12 NOTE — CARE PLAN
"  Problem: Bladder / Voiding  Goal: Patient will establish and maintain regular urinary output  Outcome: Progressing  Patient is incontinent to urine and bowel with active C-Diff infection and moisture associated skin damage; condom cath placed per order.     Problem: Fall Risk - Rehab  Goal: Patient will remain free from falls  Outcome: Progressing  Camilla Gruber Fall risk Assessment Score: 21    High fall risk Interventions   - Alarming seatbelt  - Wander guard  - Bed and strip alarm   - Yellow sign by the door   - Yellow wrist band \"Fall risk\"  - Room near to the nurse station  - Do not leave patient unattended in the bathroom  - Fall risk education provided                   "

## 2022-06-12 NOTE — PROGRESS NOTES
Hospital Medicine Daily Progress Note      Chief Complaint:  Hypertension  Diabetes  Leukocytosis    Interval History:  Pt awake and alert, feels well.  Labs reviewed and discussed.    Review of Systems  Review of Systems   Constitutional: Negative for chills and fever.   HENT: Negative.    Eyes: Negative.    Respiratory: Negative for cough and shortness of breath.    Cardiovascular: Negative for chest pain and palpitations.   Gastrointestinal: Positive for diarrhea. Negative for abdominal pain, nausea and vomiting.   Genitourinary:        +incontinent   Musculoskeletal: Negative.    Skin: Negative for itching and rash.   Endo/Heme/Allergies: Negative for polydipsia. Does not bruise/bleed easily.   Psychiatric/Behavioral: Positive for memory loss.        Physical Exam  Temp:  [36.6 °C (97.8 °F)-37 °C (98.6 °F)] 36.6 °C (97.8 °F)  Pulse:  [67-92] 77  Resp:  [16-19] 18  BP: (107-138)/(59-84) 138/84  SpO2:  [94 %-95 %] 95 %    Physical Exam  Vitals reviewed.   Constitutional:       General: He is not in acute distress.     Appearance: Normal appearance. He is not ill-appearing.   HENT:      Head: Normocephalic and atraumatic.      Right Ear: External ear normal.      Left Ear: External ear normal.      Nose: Nose normal.      Mouth/Throat:      Pharynx: Oropharynx is clear.   Eyes:      General:         Right eye: No discharge.         Left eye: No discharge.      Extraocular Movements: Extraocular movements intact.      Conjunctiva/sclera: Conjunctivae normal.   Cardiovascular:      Rate and Rhythm: Normal rate and regular rhythm.   Pulmonary:      Effort: Pulmonary effort is normal. No respiratory distress.      Breath sounds: Normal breath sounds. No wheezing.   Abdominal:      General: Bowel sounds are normal. There is no distension.      Tenderness: There is no abdominal tenderness. There is no guarding or rebound.   Musculoskeletal:      Cervical back: Normal range of motion and neck supple.      Right lower leg:  No edema.      Left lower leg: No edema.   Skin:     General: Skin is warm and dry.   Neurological:      Mental Status: He is alert.      Comments: Easily arouses from sleep         Fluids    Intake/Output Summary (Last 24 hours) at 6/12/2022 1103  Last data filed at 6/12/2022 0909  Gross per 24 hour   Intake 600 ml   Output --   Net 600 ml       Laboratory  Recent Labs     06/10/22  0610 06/12/22  0556   WBC 11.7* 9.8   RBC 4.16* 3.70*   HEMOGLOBIN 11.7* 10.4*   HEMATOCRIT 36.2* 32.6*   MCV 87.0 88.1   MCH 28.1 28.1   MCHC 32.3* 31.9*   RDW 45.6 44.5   PLATELETCT 446 419   MPV 9.7 9.8     Recent Labs     06/10/22  0610 06/11/22  0621 06/12/22  0556   SODIUM 141  --  139   POTASSIUM 3.6  --  3.6   CHLORIDE 108  --  107   CO2 21  --  21   GLUCOSE 115*  --  134*   BUN 32* 34* 28*   CREATININE 1.12 1.07 1.07   CALCIUM 8.2*  --  8.0*               Assessment/Plan  Alkaline phosphatase elevation  Assessment & Plan  U/S Abd mild hepatomegaly, exophytic interpolar left renal cyst identified on the recent CT is not well visualized on ultrasound, renal protocol MRI could be obtained if indicated, s/p cholecystectomy, mild bilateral hydronephrosis, no obstructive etiology identified, and nonobstructive right renal stone measuring 6 mm  Alk Phos levels improving    Gout  Assessment & Plan  On Allopurinol    Hypokalemia  Assessment & Plan  K+ repleted  Outpt meds include K+ supplement    C. difficile colitis  Assessment & Plan  UA  WBC, UCx negative  C Dif positive  CXR +atx, continue IS  KUB possible colitis  BCx x 2 NTGD  CT interval increase in size and density of an exophytic interpolar left renal cyst suggesting internal hemorrhage with surrounding small amount of perinephric fluid/hemorrhage, minimal/mild hydronephrosis (previously moderate to severe), urinary bladder wall thickening is again noted, possibly somewhat accentuated by underdistention, there is nondependent air which could be related to recent  catheterization however infection should be excluded, there is probable mild colonic wall thickening, suspect mild colitis  Continue PO Vanco x 10 days  Diarrhea improving  WBC slowly normalized    Vitamin D insufficiency  Assessment & Plan  Vit D level 29  On supplementation    Azotemia  Assessment & Plan  BNP mildly elevated but improving  Echo EF 60%  Renal function improved w/ IVF    Essential hypertension, benign- (present on admission)  Assessment & Plan  On Norvasc, Losartan, and Toprol  Observe blood pressure trends  Monitor for orthostatic hypotension on Flomax    Benign prostatic hyperplasia with urinary obstruction- (present on admission)  Assessment & Plan  S/P TURP in March 2022 w/ Dr. Barry  Gross hematuria resolved w/ CBI x 24 hours on 6/8/22  Pt's wife reports that Urology told her to expect intermittent hematuria for months following TURP  But suspect bleeding may have been from left kidney hemorrhage (see CT Abd/Pelvis 6/8/22)  Needs  F/U    Normocytic anemia- (present on admission)  Assessment & Plan  Fe 20  Would hold off on starting supplements until GI issues resolved  Follow H/H    GERD with esophagitis- (present on admission)  Assessment & Plan  On Prilosec    Recurrent UTI- (present on admission)  Assessment & Plan  Pt self-caths as outpt and has h/o recurrent UTI  UCx 5/24/22 >100,000 Staph Aureus, completed Ancef 6/5/22  UCx 6/1/22 ,000 Candida, completed Fluconazole 6/6/22    Depression  Assessment & Plan  On Prozac    Hypomagnesemia- (present on admission)  Assessment & Plan  Discontinued MgOx for diarrhea  Continue to replace w/ MgSO4 as needed  Outpt meds include Mg++ supplement    H/O Cerebrovascular accident (CVA) in adulthood- (present on admission)  Assessment & Plan  On Plavix and Lipitor    Type 2 diabetes mellitus, with long-term current use of insulin (HCC)- (present on admission)  Assessment & Plan  HbA1c 6.6  Discontinued Glargine for hypoglycemic trends  Observe serum  glucose trends on SSI  Outpt meds include Trulicity 3 mg weekly, Toujeo 5-12 units daily, and Humalog 5 units base and per SS    Full Code    Discussed w/ pt, wife, and RN

## 2022-06-12 NOTE — PROGRESS NOTES
Patient is incontinent to urine and bowel and has moisture associated skin damage as well as an active C-diff infection;condom catheter placed per order via nursing communication.

## 2022-06-13 LAB
BACTERIA BLD CULT: NORMAL
BACTERIA BLD CULT: NORMAL
GLUCOSE BLD STRIP.AUTO-MCNC: 133 MG/DL (ref 65–99)
GLUCOSE BLD STRIP.AUTO-MCNC: 149 MG/DL (ref 65–99)
GLUCOSE BLD STRIP.AUTO-MCNC: 160 MG/DL (ref 65–99)
GLUCOSE BLD STRIP.AUTO-MCNC: 172 MG/DL (ref 65–99)
GLUCOSE BLD STRIP.AUTO-MCNC: 184 MG/DL (ref 65–99)
SIGNIFICANT IND 70042: NORMAL
SIGNIFICANT IND 70042: NORMAL
SITE SITE: NORMAL
SITE SITE: NORMAL
SOURCE SOURCE: NORMAL
SOURCE SOURCE: NORMAL

## 2022-06-13 PROCEDURE — 97130 THER IVNTJ EA ADDL 15 MIN: CPT

## 2022-06-13 PROCEDURE — 700102 HCHG RX REV CODE 250 W/ 637 OVERRIDE(OP): Performed by: HOSPITALIST

## 2022-06-13 PROCEDURE — 770010 HCHG ROOM/CARE - REHAB SEMI PRIVAT*

## 2022-06-13 PROCEDURE — A9270 NON-COVERED ITEM OR SERVICE: HCPCS | Performed by: HOSPITALIST

## 2022-06-13 PROCEDURE — 700101 HCHG RX REV CODE 250: Performed by: PHYSICAL MEDICINE & REHABILITATION

## 2022-06-13 PROCEDURE — 99232 SBSQ HOSP IP/OBS MODERATE 35: CPT | Performed by: HOSPITALIST

## 2022-06-13 PROCEDURE — 97129 THER IVNTJ 1ST 15 MIN: CPT

## 2022-06-13 PROCEDURE — 97535 SELF CARE MNGMENT TRAINING: CPT

## 2022-06-13 PROCEDURE — 99232 SBSQ HOSP IP/OBS MODERATE 35: CPT | Performed by: PHYSICAL MEDICINE & REHABILITATION

## 2022-06-13 PROCEDURE — 82962 GLUCOSE BLOOD TEST: CPT | Mod: 91

## 2022-06-13 PROCEDURE — 97110 THERAPEUTIC EXERCISES: CPT

## 2022-06-13 PROCEDURE — 700102 HCHG RX REV CODE 250 W/ 637 OVERRIDE(OP): Performed by: PHYSICAL MEDICINE & REHABILITATION

## 2022-06-13 PROCEDURE — A9270 NON-COVERED ITEM OR SERVICE: HCPCS | Performed by: PHYSICAL MEDICINE & REHABILITATION

## 2022-06-13 PROCEDURE — 97530 THERAPEUTIC ACTIVITIES: CPT

## 2022-06-13 RX ADMIN — LOSARTAN POTASSIUM 50 MG: 25 TABLET, FILM COATED ORAL at 20:32

## 2022-06-13 RX ADMIN — OXYCODONE 5 MG: 5 TABLET ORAL at 09:42

## 2022-06-13 RX ADMIN — ATORVASTATIN CALCIUM 80 MG: 40 TABLET, FILM COATED ORAL at 20:32

## 2022-06-13 RX ADMIN — Medication 1000 UNITS: at 07:36

## 2022-06-13 RX ADMIN — BACLOFEN 5 MG: 10 TABLET ORAL at 14:10

## 2022-06-13 RX ADMIN — VANCOMYCIN HYDROCHLORIDE 125 MG: KIT ORAL at 11:39

## 2022-06-13 RX ADMIN — LOSARTAN POTASSIUM 50 MG: 25 TABLET, FILM COATED ORAL at 07:36

## 2022-06-13 RX ADMIN — AMLODIPINE BESYLATE 10 MG: 5 TABLET ORAL at 06:02

## 2022-06-13 RX ADMIN — TAMSULOSIN HYDROCHLORIDE 0.4 MG: 0.4 CAPSULE ORAL at 07:36

## 2022-06-13 RX ADMIN — ALLOPURINOL 100 MG: 100 TABLET ORAL at 06:02

## 2022-06-13 RX ADMIN — BACLOFEN 5 MG: 10 TABLET ORAL at 07:36

## 2022-06-13 RX ADMIN — VANCOMYCIN HYDROCHLORIDE 125 MG: KIT ORAL at 17:28

## 2022-06-13 RX ADMIN — DIBASIC SODIUM PHOSPHATE, MONOBASIC POTASSIUM PHOSPHATE AND MONOBASIC SODIUM PHOSPHATE 250 MG: 852; 155; 130 TABLET ORAL at 07:36

## 2022-06-13 RX ADMIN — LIDOCAINE 1 PATCH: 50 PATCH TOPICAL at 14:10

## 2022-06-13 RX ADMIN — DIBASIC SODIUM PHOSPHATE, MONOBASIC POTASSIUM PHOSPHATE AND MONOBASIC SODIUM PHOSPHATE 250 MG: 852; 155; 130 TABLET ORAL at 14:10

## 2022-06-13 RX ADMIN — FLUOXETINE 40 MG: 20 CAPSULE ORAL at 07:36

## 2022-06-13 RX ADMIN — DIBASIC SODIUM PHOSPHATE, MONOBASIC POTASSIUM PHOSPHATE AND MONOBASIC SODIUM PHOSPHATE 250 MG: 852; 155; 130 TABLET ORAL at 20:31

## 2022-06-13 RX ADMIN — VANCOMYCIN HYDROCHLORIDE 125 MG: KIT ORAL at 00:08

## 2022-06-13 RX ADMIN — VANCOMYCIN HYDROCHLORIDE 125 MG: KIT ORAL at 06:02

## 2022-06-13 RX ADMIN — CLOPIDOGREL 75 MG: 75 TABLET, FILM COATED ORAL at 06:02

## 2022-06-13 RX ADMIN — OMEPRAZOLE 20 MG: 20 CAPSULE, DELAYED RELEASE ORAL at 07:36

## 2022-06-13 ASSESSMENT — ENCOUNTER SYMPTOMS
VOMITING: 0
EYES NEGATIVE: 1
CHILLS: 0
MUSCULOSKELETAL NEGATIVE: 1
SHORTNESS OF BREATH: 0
NAUSEA: 0
BRUISES/BLEEDS EASILY: 0
FEVER: 0
PALPITATIONS: 0
DIARRHEA: 0
COUGH: 0
MEMORY LOSS: 1
ABDOMINAL PAIN: 0
POLYDIPSIA: 0

## 2022-06-13 ASSESSMENT — ACTIVITIES OF DAILY LIVING (ADL)
BED_CHAIR_WHEELCHAIR_TRANSFER_DESCRIPTION: SQUAT PIVOT TRANSFER TO WHEELCHAIR
TUB_SHOWER_TRANSFER_DESCRIPTION: GRAB BAR;SHOWER BENCH;INCREASED TIME;REQUIRES LIFT;SET-UP OF EQUIPMENT;SUPERVISION FOR SAFETY;VERBAL CUEING

## 2022-06-13 NOTE — CARE PLAN
"The patient is Stable - Low risk of patient condition declining or worsening    Shift Goals  Clinical Goals: Safety and comfort  Patient Goals: Comfort    Progress made toward(s) clinical / shift goals:  \progressing      Problem: Fall Risk - Rehab  Goal: Patient will remain free from falls  Note: Camilla Gruber Fall risk Assessment Score: 21    High fall risk Interventions   - Alarming seatbelt  - Bed and strip alarm   - Yellow sign by the door   - Yellow wrist band \"Fall risk\"  - Room near to the nurse station  - Do not leave patient unattended in the bathroom  - Fall risk education provided       "

## 2022-06-13 NOTE — PROGRESS NOTES
Hospital Medicine Daily Progress Note      Chief Complaint:  Hypertension  Diabetes  Leukocytosis    Interval History:  Doing well.  No abdominal pain.    Review of Systems  Review of Systems   Constitutional: Negative for chills and fever.   HENT: Negative.    Eyes: Negative.    Respiratory: Negative for cough and shortness of breath.    Cardiovascular: Negative for chest pain and palpitations.   Gastrointestinal: Negative for abdominal pain, diarrhea, nausea and vomiting.   Genitourinary:        +incontinent   Musculoskeletal: Negative.    Skin: Negative for itching and rash.   Endo/Heme/Allergies: Negative for polydipsia. Does not bruise/bleed easily.   Psychiatric/Behavioral: Positive for memory loss.        Physical Exam  Temp:  [36.6 °C (97.9 °F)-37.1 °C (98.8 °F)] 36.6 °C (97.9 °F)  Pulse:  [66-77] 66  Resp:  [16-18] 16  BP: (136-152)/(67-82) 152/79  SpO2:  [95 %-96 %] 96 %    Physical Exam  Vitals reviewed.   Constitutional:       General: He is not in acute distress.     Appearance: Normal appearance. He is not ill-appearing.   HENT:      Head: Normocephalic and atraumatic.      Right Ear: External ear normal.      Left Ear: External ear normal.      Nose: Nose normal.      Mouth/Throat:      Pharynx: Oropharynx is clear.   Eyes:      General:         Right eye: No discharge.         Left eye: No discharge.      Extraocular Movements: Extraocular movements intact.      Conjunctiva/sclera: Conjunctivae normal.   Cardiovascular:      Rate and Rhythm: Normal rate and regular rhythm.   Pulmonary:      Effort: Pulmonary effort is normal. No respiratory distress.      Breath sounds: Normal breath sounds. No wheezing.   Abdominal:      General: Bowel sounds are normal. There is no distension.      Tenderness: There is no abdominal tenderness. There is no guarding or rebound.   Musculoskeletal:      Cervical back: Normal range of motion and neck supple.      Right lower leg: No edema.      Left lower leg: No edema.    Skin:     General: Skin is warm and dry.   Neurological:      Mental Status: He is alert.      Comments: Awake and alert         Fluids    Intake/Output Summary (Last 24 hours) at 6/13/2022 1401  Last data filed at 6/13/2022 0405  Gross per 24 hour   Intake --   Output 800 ml   Net -800 ml       Laboratory  Recent Labs     06/12/22  0556   WBC 9.8   RBC 3.70*   HEMOGLOBIN 10.4*   HEMATOCRIT 32.6*   MCV 88.1   MCH 28.1   MCHC 31.9*   RDW 44.5   PLATELETCT 419   MPV 9.8     Recent Labs     06/11/22  0621 06/12/22  0556   SODIUM  --  139   POTASSIUM  --  3.6   CHLORIDE  --  107   CO2  --  21   GLUCOSE  --  134*   BUN 34* 28*   CREATININE 1.07 1.07   CALCIUM  --  8.0*               Assessment/Plan  Alkaline phosphatase elevation  Assessment & Plan  U/S Abd mild hepatomegaly, exophytic interpolar left renal cyst identified on the recent CT is not well visualized on ultrasound, renal protocol MRI could be obtained if indicated, s/p cholecystectomy, mild bilateral hydronephrosis, no obstructive etiology identified, and nonobstructive right renal stone measuring 6 mm  Alk Phos levels improving    Gout  Assessment & Plan  On Allopurinol    Hypophosphatemia  Assessment & Plan  Phosphorus levels correcting  Complete short course Neutra-Phos    Hypokalemia  Assessment & Plan  K+ repleted  Outpt meds include K+ supplement    C. difficile colitis  Assessment & Plan  UA  WBC, UCx negative  C Dif positive  CXR +atx, continue IS  KUB possible colitis  BCx x 2 NTGD  CT interval increase in size and density of an exophytic interpolar left renal cyst suggesting internal hemorrhage with surrounding small amount of perinephric fluid/hemorrhage, minimal/mild hydronephrosis (previously moderate to severe), urinary bladder wall thickening is again noted, possibly somewhat accentuated by underdistention, there is nondependent air which could be related to recent catheterization however infection should be excluded, there is probable  mild colonic wall thickening, suspect mild colitis  Continue PO Vanco x 10 days  Diarrhea improving  WBC slowly normalized    Vitamin D insufficiency  Assessment & Plan  Vit D level 29  On supplementation    Azotemia  Assessment & Plan  BNP mildly elevated but improving  Echo EF 60%  Renal function improved w/ IVF    Essential hypertension, benign- (present on admission)  Assessment & Plan  On Norvasc, Losartan, and Toprol  May need to increase meds if elevated blood pressures persist  Monitor for orthostatic hypotension on Flomax    Benign prostatic hyperplasia with urinary obstruction- (present on admission)  Assessment & Plan  S/P TURP in March 2022 w/ Dr. Barry  Gross hematuria resolved w/ CBI x 24 hours on 6/8/22  Pt's wife reports that Urology told her to expect intermittent hematuria for months following TURP  But suspect bleeding may have been from left kidney hemorrhage (see CT Abd/Pelvis 6/8/22)  Needs  F/U    Normocytic anemia- (present on admission)  Assessment & Plan  Fe 20  Would hold off on starting supplements until GI issues resolved  Follow H/H    GERD with esophagitis- (present on admission)  Assessment & Plan  On Prilosec    Recurrent UTI- (present on admission)  Assessment & Plan  Pt self-caths as outpt and has h/o recurrent UTI  UCx 5/24/22 >100,000 Staph Aureus, completed Ancef 6/5/22  UCx 6/1/22 ,000 Candida, completed Fluconazole 6/6/22    Depression  Assessment & Plan  On Prozac    Hypomagnesemia- (present on admission)  Assessment & Plan  Discontinued MgOx for diarrhea  Continue to replace w/ MgSO4 as needed  Outpt meds include Mg++ supplement    H/O Cerebrovascular accident (CVA) in adulthood- (present on admission)  Assessment & Plan  On Plavix and Lipitor    Type 2 diabetes mellitus, with long-term current use of insulin (HCC)- (present on admission)  Assessment & Plan  HbA1c 6.6  Discontinued Glargine for hypoglycemic trends  Observe serum glucose trends on SSI  Outpt meds  include Trulicity 3 mg weekly, Toujeo 5-12 units daily, and Humalog 5 units base and per SS    Full Code

## 2022-06-13 NOTE — PROGRESS NOTES
Report received at bedside, pt care assumed. Pt aaox3, no signs of distress noted at this time. Patient resting comfortably in bed. POC discussed with pt and verbalizes no questions. Pt c/o of 7/10 pain, declined medication. Pt denies any additional needs at this time. Bed in lowest position, pt educated on fall risk and verbalized understanding, call light within reach, bed alarm plugged in and on.

## 2022-06-13 NOTE — CARE PLAN
The patient is Stable - Low risk of patient condition declining or worsening    Shift Goals  Clinical Goals: Safety and comfort  Patient Goals: Comfort    Progress made toward(s) clinical / shift goals:    Problem: Skin Integrity  Goal: Skin integrity is maintained or improved  Outcome: Progressing  Note: Patient's sacral dressing change completed. Patient has no further skin breakdown. Patient is dry and comfortable.     Problem: Fall Risk - Rehab  Goal: Patient will remain free from falls  Outcome: Progressing  Note: Patient has been educated on fall risk and use of the call bell. Patient verbalizes understanding.       Patient is not progressing towards the following goals:

## 2022-06-13 NOTE — THERAPY
Occupational Therapy  Daily Treatment     Patient Name: Av Bob  Age:  77 y.o., Sex:  male  Medical Record #: [id number]  Today's Date: 6/13/2022     Precautions  Precautions: (P) Fall Risk, Swallow Precautions ( See Comments)  Comments: (P) L eric from previous CVA poor OOB tolerance, wounds on buttocks, serial cast to LUE 6/8/22, positive cdiff    Safety   ADL Safety : Requires Physical Assist for Safety (2 person)    Subjective    Pt agree able to shower for OT session     Objective       06/13/22 0831   OT Charge Group   OT Self Care / ADL 4   OT Total Time Spent   OT Individual Total Time Spent (Mins) 60   Precautions   Precautions Fall Risk;Swallow Precautions ( See Comments)   Comments L eric from previous CVA poor OOB tolerance, wounds on buttocks, serial cast to LUE 6/8/22, positive cdiff   Functional Level of Assist   Bathing Total Assist x 2   Bathing Description Grab bar;Hand held shower;Tub bench;Assit with back;Assit wtih lower extremities;Assit with perineal;Increased time;Initial preparation for task;Set-up of equipment;Supervision for safety;Set up for wound protection   Upper Body Dressing Total Assist   Upper Body Dressing Description Assist with pulling shirt over head;Assit with threading arms through sleeves;Increased time;Initial preparation for task;Supervision for safety;Set-up of equipment;Verbal cueing   Lower Body Dressing Total Assist x 2   Lower Body Dressing Description   (doff brief during w/c>shower transfer, don brief in bed)   Toileting Total Assist x 2   Toileting Description   (incontinent of bowel in brief and shower; 2 person assist for hygiene/brief change in supine via rolling)   Bed, Chair, Wheelchair Transfer Total Assist X 2  (Max of 2 bed>w/c, nelly lift w/c to bed)   Bed Chair Wheelchair Transfer Description Squat pivot transfer to wheelchair   Tub / Shower Transfers Total Assist X 2   Tub Shower Transfer Description Grab bar;Shower bench;Increased  time;Requires lift;Set-up of equipment;Supervision for safety;Verbal cueing   Interdisciplinary Plan of Care Collaboration   IDT Collaboration with  Therapy Tech;Nursing;Certified Nursing Assistant   Patient Position at End of Therapy In Bed;Phone within Reach;Tray Table within Reach;Call Light within Reach   Collaboration Comments Assist w/ care, wound check, assist w/ care       Assessment    Pt tolerated session poorly focused on ADLs. Pt required increased assistance for all ADLs and reported increased pain and fatigue throughout activity. Pt was passive during UB dressing, bathing, and hygiene. Pt was able to assist with rolling in bed but cont to require total X2 to complete LB dressing while rolling. Pt's transfers declined throughout session and pt required nelly for safe transfer back to bed after shower.   Strengths: Motivated for self care and independence, Pleasant and cooperative, Supportive family, Willingly participates in therapeutic activities  Barriers: Confused, Decreased endurance, Fatigue, Generalized weakness, Impaired activity tolerance, Impaired balance, Impaired functional cognition, Limited mobility    Plan    90 in shower sessions needed  Monitor LUE (skin/circulation, comfort) following serial casting (applied on 6/8).  Pressure sore prevention/mgmt, Attention to task, ADLs, rolling, 1 step directions, anterior weight shift L and R for progression of functional transfers, seated balance, STS,     Passport items to be completed:  Perform bathroom transfers, complete dressing, complete feeding, get ready for the day, prepare a simple meal, participate in household tasks, adapt home for safety needs, demonstrate home exercise program, complete caregiver training       Occupational Therapy Goals (Active)       Problem: Bathing       Dates: Start: 06/01/22         Goal: STG-Within one week, patient will bathe w/ max A using DME as needed.        Dates: Start: 06/01/22         Goal Note filed  on 06/09/22 1142 by Selene Elliott OT       Pt requires Total A                 Problem: Dressing       Dates: Start: 06/01/22         Goal: STG-Within one week, patient will dress UB w/ mod A.       Dates: Start: 06/01/22         Goal Note filed on 06/09/22 1142 by Selene Elliott OT       Pt requires Max A              Goal: STG-Within one week, patient will dress LB w/ mod A.        Dates: Start: 06/01/22         Goal Note filed on 06/09/22 1142 by Selene Elliott OT       Pt requires Total Ax 2 in bed and from w/c level                 Problem: Functional Transfers       Dates: Start: 06/01/22         Goal: STG-Within one week, patient will transfer to toilet w/ max A using DME as needed.       Dates: Start: 06/01/22         Goal Note filed on 06/09/22 1142 by Selene Elliott OT       Pt requires Total x2              Goal: STG-Within one week, patient will transfer to step in shower with max A using DME as needed.       Dates: Start: 06/01/22         Goal Note filed on 06/09/22 1142 by Selene Elliott OT       Pt requires Total x2 for walk in shower                 Problem: OT Long Term Goals       Dates: Start: 06/01/22         Goal: LTG-By discharge, patient will complete basic self care tasks with min A using DME and AE as needed.       Dates: Start: 06/01/22            Goal: LTG-By discharge, patient will perform bathroom transfers w/ min A using DME and AE as needed.       Dates: Start: 06/01/22               Problem: Toileting       Dates: Start: 06/01/22         Goal: STG-Within one week, patient will complete toileting tasks w/ max A using DME as needed.        Dates: Start: 06/01/22         Goal Note filed on 06/09/22 1142 by Selene Elliott OT       Pt requires Total x2

## 2022-06-13 NOTE — THERAPY
Occupational Therapy  Daily Treatment     Patient Name: Av Bob  Age:  77 y.o., Sex:  male  Medical Record #: [id number]  Today's Date: 6/13/2022     Precautions  Precautions: Fall Risk  Comments: L eric from previous CVA poor OOB tolerance, wounds on buttocks, serial cast to LUE 6/8/22, positive cdiff    Safety   ADL Safety : Requires Physical Assist for Safety (2 person)    Subjective    Pt's wife participated throughout session.      Objective       06/13/22 1431   OT Charge Group   OT Therapy Activity 2   OT Total Time Spent   OT Individual Total Time Spent (Mins) 30   Interdisciplinary Plan of Care Collaboration   Patient Position at End of Therapy Seated;Family / Friend in Room;Phone within Reach;Tray Table within Reach;Call Light within Reach;In Bed   Pt was measured for resting hand split and educated on idea to use hand split and serial cast to increase mobility in L hand.     Pt's wife participated in conversation about pt's PLOF, CLOF, and POC. Pt's wife reports she would give min A for all transfers and max-total A for all ADLs. Pt has been incontinent of bladder for years and recently became incontinent of bowels. Pt's wife reports she was able to clean pt after incontinence at home. Pt's wife reports her biggest concern is the pt's mobility. If the pt requires more than mod A for transfer pt's wife will not be able to safely transfer him throughout the day. Pt's wife reports the pt was not very active during the day, but was much more awake than he has been in the hospital. Pt's wife is hopefull that pt will make physical gains once he is medically stable. Pt's wife reports she is starting to think of the next step if she is unable to care for the pt on her own. Pt's wife would like to speak with  to talk through options.     Assessment  Pt tolerated session poorly due to fatigue. Pt was in and out of sleep while therapist and pt's wife participated in conversation about items  above. Pt is agreeable to trial resting hand split and reports he wants to d/c home.   Strengths: Motivated for self care and independence, Pleasant and cooperative, Supportive family, Willingly participates in therapeutic activities  Barriers: Confused, Decreased endurance, Fatigue, Generalized weakness, Impaired activity tolerance, Impaired balance, Impaired functional cognition, Limited mobility    Plan  90 in shower sessions needed  Monitor LUE (skin/circulation, comfort) following serial casting (applied on 6/8).  Pressure sore prevention/mgmt, Attention to task, ADLs, rolling, 1 step directions, anterior weight shift L and R for progression of functional transfers, seated balance, STS,     Passport items to be completed:  Perform bathroom transfers, complete dressing, complete feeding, get ready for the day, prepare a simple meal, participate in household tasks, adapt home for safety needs, demonstrate home exercise program, complete caregiver training     Occupational Therapy Goals (Active)       Problem: Bathing       Dates: Start: 06/01/22         Goal: STG-Within one week, patient will bathe w/ max A using DME as needed.        Dates: Start: 06/01/22         Goal Note filed on 06/09/22 1142 by Selene Elliott OT       Pt requires Total A                 Problem: Dressing       Dates: Start: 06/01/22         Goal: STG-Within one week, patient will dress UB w/ mod A.       Dates: Start: 06/01/22         Goal Note filed on 06/09/22 1142 by Selene Elliott OT       Pt requires Max A              Goal: STG-Within one week, patient will dress LB w/ mod A.        Dates: Start: 06/01/22         Goal Note filed on 06/09/22 1142 by Selene Elliott OT       Pt requires Total Ax 2 in bed and from w/c level                 Problem: Functional Transfers       Dates: Start: 06/01/22         Goal: STG-Within one week, patient will transfer to toilet w/ max A using DME as needed.       Dates: Start:  06/01/22         Goal Note filed on 06/09/22 1142 by Selene Elliott OT       Pt requires Total x2              Goal: STG-Within one week, patient will transfer to step in shower with max A using DME as needed.       Dates: Start: 06/01/22         Goal Note filed on 06/09/22 1142 by Selene Elliott OT       Pt requires Total x2 for walk in shower                 Problem: OT Long Term Goals       Dates: Start: 06/01/22         Goal: LTG-By discharge, patient will complete basic self care tasks with min A using DME and AE as needed.       Dates: Start: 06/01/22            Goal: LTG-By discharge, patient will perform bathroom transfers w/ min A using DME and AE as needed.       Dates: Start: 06/01/22               Problem: Toileting       Dates: Start: 06/01/22         Goal: STG-Within one week, patient will complete toileting tasks w/ max A using DME as needed.        Dates: Start: 06/01/22         Goal Note filed on 06/09/22 1142 by Selene Elliott OT       Pt requires Total x2

## 2022-06-13 NOTE — PROGRESS NOTES
Rehab Progress Note     Encounter Date: 6/13/2022    CC: Decreased mobility, confusion    Interval Events (Subjective)  Patient sitting up in room. He reports therapy is going well although he is exhausted. He is hopeful he will be able to work with them again this afternoon. He is still having multiple BMs on antibiotics. Denies abdominal pain. Denies NV. Elevated SBP.     IDT Team Meeting 6/9/2022  DC/Disposition:  6/21/22    Objective:  VITAL SIGNS: BP (!) 152/79   Pulse 66   Temp 36.6 °C (97.9 °F) (Oral)   Resp 16   Ht 1.829 m (6')   Wt 85.5 kg (188 lb 7.9 oz)   SpO2 96%   BMI 25.56 kg/m²   Gen: NAD  Psych: Mood and affect appropriate  CV: RRR, no edema  Resp: CTAB, no upper airway sounds  Abd: NTND  Neuro: AOx3, following commands    Recent Results (from the past 72 hour(s))   POCT glucose device results    Collection Time: 06/10/22  5:24 PM   Result Value Ref Range    POC Glucose, Blood 160 (H) 65 - 99 mg/dL   POCT glucose device results    Collection Time: 06/10/22  9:01 PM   Result Value Ref Range    POC Glucose, Blood 191 (H) 65 - 99 mg/dL   BUN    Collection Time: 06/11/22  6:21 AM   Result Value Ref Range    Bun 34 (H) 8 - 22 mg/dL   CREATININE    Collection Time: 06/11/22  6:21 AM   Result Value Ref Range    Creatinine 1.07 0.50 - 1.40 mg/dL   proBrain Natriuretic Peptide, NT    Collection Time: 06/11/22  6:21 AM   Result Value Ref Range    NT-proBNP 673 (H) 0 - 125 pg/mL   ESTIMATED GFR    Collection Time: 06/11/22  6:21 AM   Result Value Ref Range    GFR (CKD-EPI) 72 >60 mL/min/1.73 m 2   POCT glucose device results    Collection Time: 06/11/22  7:55 AM   Result Value Ref Range    POC Glucose, Blood 126 (H) 65 - 99 mg/dL   POCT glucose device results    Collection Time: 06/11/22 11:36 AM   Result Value Ref Range    POC Glucose, Blood 197 (H) 65 - 99 mg/dL   EC-ECHOCARDIOGRAM COMPLETE W/O CONT    Collection Time: 06/11/22 12:15 PM   Result Value Ref Range    Eject.Frac. MOD BP 61.24      Eject.Frac. MOD 4C 64.79     Eject.Frac. MOD 2C 57.6     Left Ventrical Ejection Fraction 60    POCT glucose device results    Collection Time: 06/11/22  5:24 PM   Result Value Ref Range    POC Glucose, Blood 141 (H) 65 - 99 mg/dL   POCT glucose device results    Collection Time: 06/11/22  9:04 PM   Result Value Ref Range    POC Glucose, Blood 179 (H) 65 - 99 mg/dL   MAGNESIUM    Collection Time: 06/12/22  5:56 AM   Result Value Ref Range    Magnesium 1.3 (L) 1.5 - 2.5 mg/dL   PHOSPHORUS    Collection Time: 06/12/22  5:56 AM   Result Value Ref Range    Phosphorus 2.9 2.5 - 4.5 mg/dL   proBrain Natriuretic Peptide, NT    Collection Time: 06/12/22  5:56 AM   Result Value Ref Range    NT-proBNP 494 (H) 0 - 125 pg/mL   CBC WITH DIFFERENTIAL    Collection Time: 06/12/22  5:56 AM   Result Value Ref Range    WBC 9.8 4.8 - 10.8 K/uL    RBC 3.70 (L) 4.70 - 6.10 M/uL    Hemoglobin 10.4 (L) 14.0 - 18.0 g/dL    Hematocrit 32.6 (L) 42.0 - 52.0 %    MCV 88.1 81.4 - 97.8 fL    MCH 28.1 27.0 - 33.0 pg    MCHC 31.9 (L) 33.7 - 35.3 g/dL    RDW 44.5 35.9 - 50.0 fL    Platelet Count 419 164 - 446 K/uL    MPV 9.8 9.0 - 12.9 fL    Neutrophils-Polys 76.90 (H) 44.00 - 72.00 %    Lymphocytes 11.00 (L) 22.00 - 41.00 %    Monocytes 6.90 0.00 - 13.40 %    Eosinophils 3.60 0.00 - 6.90 %    Basophils 0.50 0.00 - 1.80 %    Immature Granulocytes 1.10 (H) 0.00 - 0.90 %    Nucleated RBC 0.00 /100 WBC    Neutrophils (Absolute) 7.52 (H) 1.82 - 7.42 K/uL    Lymphs (Absolute) 1.07 1.00 - 4.80 K/uL    Monos (Absolute) 0.67 0.00 - 0.85 K/uL    Eos (Absolute) 0.35 0.00 - 0.51 K/uL    Baso (Absolute) 0.05 0.00 - 0.12 K/uL    Immature Granulocytes (abs) 0.11 0.00 - 0.11 K/uL    NRBC (Absolute) 0.00 K/uL   Comp Metabolic Panel    Collection Time: 06/12/22  5:56 AM   Result Value Ref Range    Sodium 139 135 - 145 mmol/L    Potassium 3.6 3.6 - 5.5 mmol/L    Chloride 107 96 - 112 mmol/L    Co2 21 20 - 33 mmol/L    Anion Gap 11.0 7.0 - 16.0    Glucose 134 (H) 65  - 99 mg/dL    Bun 28 (H) 8 - 22 mg/dL    Creatinine 1.07 0.50 - 1.40 mg/dL    Calcium 8.0 (L) 8.5 - 10.5 mg/dL    AST(SGOT) 23 12 - 45 U/L    ALT(SGPT) 10 2 - 50 U/L    Alkaline Phosphatase 195 (H) 30 - 99 U/L    Total Bilirubin 0.3 0.1 - 1.5 mg/dL    Albumin 2.2 (L) 3.2 - 4.9 g/dL    Total Protein 4.7 (L) 6.0 - 8.2 g/dL    Globulin 2.5 1.9 - 3.5 g/dL    A-G Ratio 0.9 g/dL   ESTIMATED GFR    Collection Time: 06/12/22  5:56 AM   Result Value Ref Range    GFR (CKD-EPI) 72 >60 mL/min/1.73 m 2   POCT glucose device results    Collection Time: 06/12/22  7:52 AM   Result Value Ref Range    POC Glucose, Blood 134 (H) 65 - 99 mg/dL   POCT glucose device results    Collection Time: 06/12/22 11:39 AM   Result Value Ref Range    POC Glucose, Blood 141 (H) 65 - 99 mg/dL   POCT glucose device results    Collection Time: 06/12/22  5:23 PM   Result Value Ref Range    POC Glucose, Blood 169 (H) 65 - 99 mg/dL   POCT glucose device results    Collection Time: 06/12/22  9:16 PM   Result Value Ref Range    POC Glucose, Blood 151 (H) 65 - 99 mg/dL   POCT glucose device results    Collection Time: 06/13/22  6:24 AM   Result Value Ref Range    POC Glucose, Blood 149 (H) 65 - 99 mg/dL   POCT glucose device results    Collection Time: 06/13/22  7:35 AM   Result Value Ref Range    POC Glucose, Blood 133 (H) 65 - 99 mg/dL   POCT glucose device results    Collection Time: 06/13/22 11:34 AM   Result Value Ref Range    POC Glucose, Blood 172 (H) 65 - 99 mg/dL       Current Facility-Administered Medications   Medication Frequency   • phosphorus (K-Phos-Neutral) per tablet 250 mg TID   • vancomycin 50 mg/mL oral soln 125 mg Q6HRS   • Pharmacy Consult Request PHARMACY TO DOSE   • miconazole 2%-zinc oxide (Vinny) topical cream Q6HRS PRN   • insulin regular (HumuLIN R,NovoLIN R) injection 4X/DAY ACHS    And   • dextrose 50% (D50W) injection 25 g Q15 MIN PRN   • amLODIPine (NORVASC) tablet 10 mg Q DAY   • baclofen (LIORESAL) tablet 5 mg TID   •  lidocaine (LIDODERM) 5 % 1 Patch Q24HR   • vitamin D3 (cholecalciferol) tablet 1,000 Units DAILY   • Respiratory Therapy Consult Continuous RT   • hydrALAZINE (APRESOLINE) tablet 25 mg Q8HRS PRN   • acetaminophen (Tylenol) tablet 650 mg Q4HRS PRN   • polyethylene glycol/lytes (MIRALAX) PACKET 1 Packet QDAY PRN    And   • magnesium hydroxide (MILK OF MAGNESIA) suspension 30 mL QDAY PRN    And   • bisacodyl (DULCOLAX) suppository 10 mg QDAY PRN   • omeprazole (PRILOSEC) capsule 20 mg DAILY   • artificial tears ophthalmic solution 1 Drop PRN   • benzocaine-menthol (Cepacol) lozenge 1 Lozenge Q2HRS PRN   • mag hydrox-al hydrox-simeth (MAALOX PLUS ES or MYLANTA DS) suspension 20 mL Q2HRS PRN   • ondansetron (ZOFRAN ODT) dispertab 4 mg 4X/DAY PRN    Or   • ondansetron (ZOFRAN) syringe/vial injection 4 mg 4X/DAY PRN   • traZODone (DESYREL) tablet 50 mg QHS PRN   • sodium chloride (OCEAN) 0.65 % nasal spray 2 Spray PRN   • oxyCODONE immediate-release (ROXICODONE) tablet 5 mg Q3HRS PRN    Or   • oxyCODONE immediate release (ROXICODONE) tablet 10 mg Q3HRS PRN   • midazolam (VERSED) 5 mg/mL (1 mL vial) PRN   • acetaminophen (Tylenol) tablet 650 mg Q6HRS PRN   • allopurinol (ZYLOPRIM) tablet 100 mg QDAY   • atorvastatin (LIPITOR) tablet 80 mg Q EVENING   • clopidogrel (PLAVIX) tablet 75 mg QDAY   • FLUoxetine (PROZAC) capsule 40 mg DAILY   • gabapentin (NEURONTIN) capsule 100 mg QHS PRN   • losartan (COZAAR) tablet 50 mg BID   • metoprolol SR (TOPROL XL) tablet 100 mg Q EVENING   • tamsulosin (FLOMAX) capsule 0.4 mg DAILY       Orders Placed This Encounter   Procedures   • Diet Order Diet: Consistent CHO (Diabetic); Second Modifier: (optional): Cardiac     Standing Status:   Standing     Number of Occurrences:   1     Order Specific Question:   Diet:     Answer:   Consistent CHO (Diabetic) [4]     Order Specific Question:   Second Modifier: (optional)     Answer:   Cardiac [6]       Assessment:  Active Hospital Problems     Diagnosis    • *Acute encephalopathy    • Hyponatremia    • Dehydration    • Essential hypertension, benign    • Benign prostatic hyperplasia with urinary obstruction    • Diarrhea    • GERD with esophagitis    • Recurrent UTI    • Hypomagnesemia    • Type 2 diabetes mellitus, with long-term current use of insulin (HCC)    • Stage 3b chronic kidney disease (HCC)        Medical Decision Making and Plan:  Acute toxic encephalopathy - Patient with UTI with urosepsis s/p IV antibiotics. Patient has urinary retention and requires occasional catheterization putting him at risk for recurrent UTIs. 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 On Plavix and statin  -Will start low dose Baclofen although most likely contracture. Serial casting to start 6/8, therapy holds otherwise for GI and leukocytosis. Most likely removal on 6/14. Will discontinue Baclofen as having some more confusion.    HTN/CAD - Patient on Plavix. Patient on Losartan 50 mg BID, Metoprolol 100 mg XL  -Consult Hospitalist. Recommending TTE     HLD - Patient on Atorvastatin 80 mg QHS     Leukocytosis - On treatment for UTI. On Ancef with recommendation to switch to Augment. Will check CBC in AM before switch  -Repeat 16.5 up from 16.1, consult Hospitalist. Improved to 12.7 on 6/5/22. Repeat 6/8 with Leukocytosis 17.9 and diffuse loose stools.   -Check C diff. Check UA. Check Blood cultures. KUB with distention and featureless colon. Discussed with hospitalist and start antibiotics including Flagyl. CT abdomen - possible small left cyst bleeding. Will monitor CBC. On Ceftriaxone. Improving leukocytosis 11.7 on 6/10/22    C Diff positive on 6/8. On Vancomycin PO. Improving. Still having loose BMs on 6/13/22. Per hospitalist continue Vancomycin for 10 days    Anemia -  Check AM CBC - 12.2 Repeat 11.3, Fe low but with GI issues holding on starting 6/8     DM2 with hyperglycemia - Patient on Glargine 11 U and SSI  -Consult hospitalist     Depression - Patient on Fluoxetine 40 mg daily      Hx of Gout - Patient on Allopurinol 100 mg daily     Urinary Retention - Patient requiring catheterization ~1 time daily. Follows with Urology. Continue Flomax  -Hematuria on 6/7 with significant amount. Hold Xarelto. Flush bladder.  Improved hematuria     Vitamin D Deficiency - 29 on admission. Start 1000 U     DVT Ppx - Patient on Xarelto ppx dose. On hold for bleeding.     Total time:  26 minutes.  I spent greater than 50% of the time for patient care, counseling, and coordination on this date, including unit/floor time, and face-to-face time with the patient as per interval events and assessment and plan above. Topics discussed included improving diarrhea, increased confusion, discontinue Baclofen and discussed about cast removal tomorrow.     Katheryn Pratt M.D.

## 2022-06-13 NOTE — THERAPY
Speech Language Pathology  Daily Treatment     Patient Name: Av Bob  Age:  77 y.o., Sex:  male  Medical Record #: [id number]  Today's Date: 6/13/2022     Precautions  Precautions: Fall Risk  Comments: L eric from previous CVA poor OOB tolerance, wounds on buttocks, serial cast to LUE 6/8/22, positive cdiff    Subjective    Pt pleasant and cooperative during tx.  Spouse present and supportive.  Pt lethargic, and required increased repetitions in order to participate, when compared to previous sessions.  MD informed of change in status.       Objective       06/13/22 1331   Treatment Charges   SLP Cognitive Skill Development First 15 Minutes 1   SLP Cognitive Skill Development Additional 15 Minutes 1   SLP Total Time Spent   SLP Individual Total Time Spent (Mins) 30   Cognition   Simple Attention Severe (2)   Orientation  Moderate (3)   Interdisciplinary Plan of Care Collaboration   IDT Collaboration with  Family / Caregiver;Physician   Collaboration Comments CLOF; increased processing time/decreased attn.       Assessment    Pt scored 15/30 on the O-log (decreased from 21).  Pt required mod verbal cue to recall daily events.  Pt constantly moving R arm up and down.  Pt required mod-max cues to respond to questions.  Pt benefits from repetition.          Plan    O-log, memory book.         Speech Therapy Problems (Active)       Problem: Memory STGs       Dates: Start: 06/01/22         Goal: STG-Within one week, patient will remember safety precautions related to hospitalization with 75% accuracy.        Dates: Start: 06/01/22         Goal Note filed on 06/08/22 [id number] by Harpal Williamson MS,CCC-SLP       Mod-max A for pt to recall new information related to his current hospitalization                  Problem: Problem Solving STGs       Dates: Start: 06/01/22         Goal: STG-Within one week, patient will complete o-log with a final score of 25/30 over three consecutive days       Dates: Start: 06/01/22          Goal Note filed on 06/08/22 6904 by Harpal Williamson MS,CCC-SLP       Pt's highest O-log score to date was 23/30                  Problem: Speech/Swallowing LTGs       Dates: Start: 06/01/22         Goal: LTG-By discharge, patient will solve basic problems in order to d/c home with SPV       Dates: Start: 06/01/22

## 2022-06-13 NOTE — THERAPY
Physical Therapy   Daily Treatment     Patient Name: Av Bob  Age:  77 y.o., Sex:  male  Medical Record #: [id number]  Today's Date: 6/13/2022     Precautions  Precautions: (P) Fall Risk  Comments: (P) L eric from previous CVA poor OOB tolerance, wounds on buttocks, serial cast to LUE 6/8/22, positive cdiff    Subjective    Patient in bed, declined OOB activity however agreeable to supine exercise.     Objective       06/13/22 1031   PT Charge Group   PT Therapeutic Exercise 2   PT Therapeutic Activities 2   PT Total Time Spent   PT Individual Total Time Spent (Mins) 60   Precautions   Precautions Fall Risk   Comments L eric from previous CVA poor OOB tolerance, wounds on buttocks, serial cast to LUE 6/8/22, positive cdiff   Pain 0 - 10 Group   Location Generalized   Supine Lower Body Exercise   Bridges Two Legged;3 sets of 10   Trunk Rotation Bilateral;3 sets of 10   Heel Slide 1 set of 10;Bilateral  (with active assist and resisted extension)   Ankle Pumps Bilateral;2 sets of 10   Other Exercises BLE hip adductor stretch 2x2 minutes   Bed Mobility    Rolling Moderate Assist to Rt.;Minimum Assist to Lt.   Interdisciplinary Plan of Care Collaboration   IDT Collaboration with  Family / Caregiver;Occupational Therapist;Therapy Tech   Patient Position at End of Therapy In Bed;Call Light within Reach;Tray Table within Reach;Phone within Reach   Collaboration Comments CLOF     Skin check on LUE serial cast; patient with new, small scratches above proximal end of cast, stating increased itchiness. Requires cues to not scratch area, states he is able to tolerate until cast is cut off tomorrow.    BLE hip IR/ER for rhythmic rotation and tone management.    Ace wrapping to BLEs for edema management; nursing notified and instructed to remove at night.    Discussion on discharge plan and what wife, Usha, feels comfortable providing. Encouraged wife to discuss options for additional services/assistance with case  manager.    Assessment    Patient tolerated session fairly, increased fatigue due to shower this morning. Increased LLE spasms which are painful to patient. Increased itchiness on LUE cast; patient in agreement with plan to remove tomorrow and re-cast LUE elbow with resting hand splint applied to hand.    Strengths: Able to follow instructions, Motivated for self care and independence, Pleasant and cooperative, Supportive family, Willingly participates in therapeutic activities  Barriers: Bladder incontinence, Decreased endurance, Hemiparesis, Impaired activity tolerance, Impaired balance, Limited mobility    Plan    Bed mobility training, transfer training (squat pivot currently), standing tolerance/balance, gait training (currently using R wall rail), ROM/stretching, neuro re-ed for L hemibody, encourage anterior weight shifting/leaning, serial casting to LUE applied on 6/8/22 (**please monitor skin/circulation, patient comfort**); plan to remove and recast Tuesday/Wednesday.     Passport items to be completed:  Get in/out of bed safely, in/out of a vehicle, safely use mobility device, walk or wheel around home/community, navigate up and down stairs, show how to get up/down from the ground, ensure home is accessible, demonstrate HEP, complete caregiver training      Physical Therapy Problems (Active)       Problem: Mobility       Dates: Start: 06/01/22         Goal: STG-Within one week, patient will propel wheelchair household distances 50 ft with Min A.       Dates: Start: 06/01/22               Problem: Mobility Transfers       Dates: Start: 06/01/22         Goal: STG-Within one week, patient will perform bed mobility with Mod A and bed functions as needed.       Dates: Start: 06/01/22            Goal: STG-Within one week, patient will transfer bed to chair with Max Ax1 via squat/stand pivot.       Dates: Start: 06/01/22               Problem: PT-Long Term Goals       Dates: Start: 06/01/22         Goal: LTG-By  discharge, patient will propel wheelchair 50 ft mod I.       Dates: Start: 06/01/22            Goal: LTG-By discharge, patient will ambulate 50 ft with LRAD and Min A.       Dates: Start: 06/01/22            Goal: LTG-By discharge, patient will transfer one surface to another with CGA and LRAD.       Dates: Start: 06/01/22            Goal: LTG-By discharge, patient will transfer in/out of a car with CGA and LRAD.       Dates: Start: 06/01/22            Goal: LTG-By discharge, patient will perform bed mobility independently.       Dates: Start: 06/01/22

## 2022-06-14 LAB
GLUCOSE BLD STRIP.AUTO-MCNC: 132 MG/DL (ref 65–99)
GLUCOSE BLD STRIP.AUTO-MCNC: 173 MG/DL (ref 65–99)
GLUCOSE BLD STRIP.AUTO-MCNC: 177 MG/DL (ref 65–99)
GLUCOSE BLD STRIP.AUTO-MCNC: 201 MG/DL (ref 65–99)

## 2022-06-14 PROCEDURE — A9270 NON-COVERED ITEM OR SERVICE: HCPCS | Performed by: HOSPITALIST

## 2022-06-14 PROCEDURE — 700102 HCHG RX REV CODE 250 W/ 637 OVERRIDE(OP): Performed by: PHYSICAL MEDICINE & REHABILITATION

## 2022-06-14 PROCEDURE — 770010 HCHG ROOM/CARE - REHAB SEMI PRIVAT*

## 2022-06-14 PROCEDURE — 99232 SBSQ HOSP IP/OBS MODERATE 35: CPT | Performed by: HOSPITALIST

## 2022-06-14 PROCEDURE — 700102 HCHG RX REV CODE 250 W/ 637 OVERRIDE(OP): Performed by: HOSPITALIST

## 2022-06-14 PROCEDURE — 97110 THERAPEUTIC EXERCISES: CPT

## 2022-06-14 PROCEDURE — 97535 SELF CARE MNGMENT TRAINING: CPT

## 2022-06-14 PROCEDURE — A9270 NON-COVERED ITEM OR SERVICE: HCPCS | Performed by: PHYSICAL MEDICINE & REHABILITATION

## 2022-06-14 PROCEDURE — 97112 NEUROMUSCULAR REEDUCATION: CPT

## 2022-06-14 PROCEDURE — 82962 GLUCOSE BLOOD TEST: CPT

## 2022-06-14 PROCEDURE — 97130 THER IVNTJ EA ADDL 15 MIN: CPT

## 2022-06-14 PROCEDURE — 99232 SBSQ HOSP IP/OBS MODERATE 35: CPT | Performed by: PHYSICAL MEDICINE & REHABILITATION

## 2022-06-14 PROCEDURE — 97129 THER IVNTJ 1ST 15 MIN: CPT

## 2022-06-14 PROCEDURE — 97530 THERAPEUTIC ACTIVITIES: CPT

## 2022-06-14 RX ORDER — TRAMADOL HYDROCHLORIDE 50 MG/1
50 TABLET ORAL EVERY 6 HOURS PRN
Status: DISCONTINUED | OUTPATIENT
Start: 2022-06-14 | End: 2022-06-21 | Stop reason: HOSPADM

## 2022-06-14 RX ADMIN — VANCOMYCIN HYDROCHLORIDE 125 MG: KIT ORAL at 05:34

## 2022-06-14 RX ADMIN — VANCOMYCIN HYDROCHLORIDE 125 MG: KIT ORAL at 17:10

## 2022-06-14 RX ADMIN — METOPROLOL SUCCINATE 100 MG: 100 TABLET, FILM COATED, EXTENDED RELEASE ORAL at 21:28

## 2022-06-14 RX ADMIN — FLUOXETINE 40 MG: 20 CAPSULE ORAL at 08:40

## 2022-06-14 RX ADMIN — AMLODIPINE BESYLATE 10 MG: 5 TABLET ORAL at 05:34

## 2022-06-14 RX ADMIN — ATORVASTATIN CALCIUM 80 MG: 40 TABLET, FILM COATED ORAL at 21:29

## 2022-06-14 RX ADMIN — VANCOMYCIN HYDROCHLORIDE 125 MG: KIT ORAL at 00:00

## 2022-06-14 RX ADMIN — ALLOPURINOL 100 MG: 100 TABLET ORAL at 05:35

## 2022-06-14 RX ADMIN — LOSARTAN POTASSIUM 50 MG: 25 TABLET, FILM COATED ORAL at 21:29

## 2022-06-14 RX ADMIN — OXYCODONE 5 MG: 5 TABLET ORAL at 12:02

## 2022-06-14 RX ADMIN — VANCOMYCIN HYDROCHLORIDE 125 MG: KIT ORAL at 23:51

## 2022-06-14 RX ADMIN — OMEPRAZOLE 20 MG: 20 CAPSULE, DELAYED RELEASE ORAL at 08:39

## 2022-06-14 RX ADMIN — CLOPIDOGREL 75 MG: 75 TABLET, FILM COATED ORAL at 05:35

## 2022-06-14 RX ADMIN — TAMSULOSIN HYDROCHLORIDE 0.4 MG: 0.4 CAPSULE ORAL at 08:40

## 2022-06-14 RX ADMIN — VANCOMYCIN HYDROCHLORIDE 125 MG: KIT ORAL at 11:42

## 2022-06-14 RX ADMIN — OXYCODONE 5 MG: 5 TABLET ORAL at 08:39

## 2022-06-14 RX ADMIN — LOSARTAN POTASSIUM 50 MG: 25 TABLET, FILM COATED ORAL at 08:39

## 2022-06-14 RX ADMIN — Medication 1000 UNITS: at 08:40

## 2022-06-14 ASSESSMENT — ENCOUNTER SYMPTOMS
ABDOMINAL PAIN: 0
NAUSEA: 0
SHORTNESS OF BREATH: 0
DIARRHEA: 0
VOMITING: 0
FEVER: 0
CHILLS: 0
NERVOUS/ANXIOUS: 0

## 2022-06-14 ASSESSMENT — ACTIVITIES OF DAILY LIVING (ADL)
BED_CHAIR_WHEELCHAIR_TRANSFER_DESCRIPTION: INCREASED TIME;INITIAL PREPARATION FOR TASK;SET-UP OF EQUIPMENT;SQUAT PIVOT TRANSFER TO WHEELCHAIR;SUPERVISION FOR SAFETY;VERBAL CUEING
TOILETING_LEVEL_OF_ASSIST_DESCRIPTION: ASSIST TO PULL PANTS UP;ASSIST TO PULL PANTS DOWN;ASSIST FOR HYGIENE

## 2022-06-14 NOTE — THERAPY
Occupational Therapy  Daily Treatment     Patient Name: Av Bob  Age:  77 y.o., Sex:  male  Medical Record #: [id number]  Today's Date: 6/14/2022     Precautions  Precautions: Fall Risk  Comments: L eric from previous CVA poor OOB tolerance, wounds on buttocks, serial cast to LUE 6/8/22, positive cdiff    Safety   ADL Safety : Requires Physical Assist for Safety (2 person)    Subjective    Pt resting in bed, agreeable to OT session.      Objective       06/14/22 0901   OT Charge Group   OT Self Care / ADL 1   OT Therapy Activity 1   OT Total Time Spent   OT Individual Total Time Spent (Mins) 30   Precautions   Precautions Fall Risk   Comments L eric from previous CVA poor OOB tolerance, wounds on buttocks, serial cast to LUE 6/8/22, positive cdiff   Cognition    Level of Consciousness Alert   Functional Level of Assist   Upper Body Dressing Maximal Assist   Upper Body Dressing Description Assit with threading arms through sleeves;Assist with pulling shirt over head;Increased time;Set-up of equipment;Supervision for safety;Verbal cueing  (assist to thread LUE, pull shirt overhead and down in back)   Toileting Total Assist x 2  (bowel incontinence)   Toileting Description Assist to pull pants up;Assist to pull pants down;Assist for hygiene  (brief change in supine, 2 person assist for rolling)   Bed, Chair, Wheelchair Transfer Maximal Assist   Bed Chair Wheelchair Transfer Description Increased time;Initial preparation for task;Set-up of equipment;Squat pivot transfer to wheelchair;Supervision for safety;Verbal cueing  (max A x1 for SQPT, 2nd person for safety)   Bed Mobility    Supine to Sit Maximal Assist   Scooting Maximal Assist   Rolling Moderate Assist to Rt.;Moderate Assist to Lt.   Skilled Intervention Sequencing;Tactile Cuing;Verbal Cuing   Interdisciplinary Plan of Care Collaboration   IDT Collaboration with  Physical Therapist;Nursing;Family / Caregiver   Patient Position at End of Therapy In  Bed   Collaboration Comments Removal of cast, ROM, CLOF, POC, bowel incontinence     ROM measurements taken w/c serial casting removed:  120 degrees elbow flexion  60 degrees wrist flexion with supination  -40 degrees elbow extension  18 degrees wrist extension  Neutral finger extension    Supine LUE ROM/stretching/tone mgt completed after serial casting removed. ROM into neutral finger and wrist position, and elbow extension.    Assessment    Pt con't to require 2 person assistance for hygiene and clothing mgt, and had bowel incontinence during session. Pt with increased elbow and wrist ext after serial casting removed. He may benefit from compression glove to address L hand edema. Unable to trial resting hand splint during session 2/2 time constraints.   Strengths: Motivated for self care and independence, Pleasant and cooperative, Supportive family, Willingly participates in therapeutic activities  Barriers: Confused, Decreased endurance, Fatigue, Generalized weakness, Impaired activity tolerance, Impaired balance, Impaired functional cognition, Limited mobility    Plan    Monitor LUE (skin/circulation, comfort) -serial casting removed on 6/14  Trial compression glove for L hand swelling, provide options for LUE resting hand splint    Pressure sore prevention/mgmt, Attention to task, ADLs, rolling, 1 step directions, anterior weight shift L and R for progression of functional transfers, seated balance, STS,       Occupational Therapy Goals (Active)       Problem: Bathing       Dates: Start: 06/01/22         Goal: STG-Within one week, patient will bathe w/ max A using DME as needed.        Dates: Start: 06/01/22         Goal Note filed on 06/09/22 1142 by Selene Elliott OT       Pt requires Total A                 Problem: Dressing       Dates: Start: 06/01/22         Goal: STG-Within one week, patient will dress UB w/ mod A.       Dates: Start: 06/01/22         Goal Note filed on 06/09/22 1142 by Selene  CLARKE Elliott OT       Pt requires Max A              Goal: STG-Within one week, patient will dress LB w/ mod A.        Dates: Start: 06/01/22         Goal Note filed on 06/09/22 1142 by Selene Ellitot OT       Pt requires Total Ax 2 in bed and from w/c level                 Problem: Functional Transfers       Dates: Start: 06/01/22         Goal: STG-Within one week, patient will transfer to toilet w/ max A using DME as needed.       Dates: Start: 06/01/22         Goal Note filed on 06/09/22 1142 by Selene Elliott OT       Pt requires Total x2              Goal: STG-Within one week, patient will transfer to step in shower with max A using DME as needed.       Dates: Start: 06/01/22         Goal Note filed on 06/09/22 1142 by Selene Elliott OT       Pt requires Total x2 for walk in shower                 Problem: OT Long Term Goals       Dates: Start: 06/01/22         Goal: LTG-By discharge, patient will complete basic self care tasks with min A using DME and AE as needed.       Dates: Start: 06/01/22            Goal: LTG-By discharge, patient will perform bathroom transfers w/ min A using DME and AE as needed.       Dates: Start: 06/01/22               Problem: Toileting       Dates: Start: 06/01/22         Goal: STG-Within one week, patient will complete toileting tasks w/ max A using DME as needed.        Dates: Start: 06/01/22         Goal Note filed on 06/09/22 1142 by Selene Elliott OT       Pt requires Total x2

## 2022-06-14 NOTE — PROGRESS NOTES
Rehab Progress Note     Encounter Date: 6/14/2022    CC: Decreased mobility, confusion    Interval Events (Subjective)  Patient had more confusion yesterday afternoon. Patient today is sitting up at his baseline. Discussed with his wife that most likely was the baclofen as it was right after the dose. Discussed we use baclofen to relax the arm further so best casting possible but I discontinued it yesterday. Therapy is currently working to recast him. Denies fever or chills. Denies rectal/sacrum pain now. His bowel movements are now more formed. He has completed his antibiotics except for the vancomycin which will end later this week.     IDT Team Meeting 6/9/2022  DC/Disposition:  6/21/22    Objective:  VITAL SIGNS: /60   Pulse 60   Temp 36.8 °C (98.3 °F) (Oral)   Resp 17   Ht 1.829 m (6')   Wt 85.5 kg (188 lb 7.9 oz)   SpO2 94%   BMI 25.56 kg/m²   Gen: NAD  Psych: Mood and affect appropriate  CV: RRR, no edema  Resp: CTAB, no upper airway sounds  Abd: NTND  Neuro: AOx3, following commands  Unchanged from 6/13/22, wiggling fingers as cast being bivalved     Recent Results (from the past 72 hour(s))   POCT glucose device results    Collection Time: 06/11/22  5:24 PM   Result Value Ref Range    POC Glucose, Blood 141 (H) 65 - 99 mg/dL   POCT glucose device results    Collection Time: 06/11/22  9:04 PM   Result Value Ref Range    POC Glucose, Blood 179 (H) 65 - 99 mg/dL   MAGNESIUM    Collection Time: 06/12/22  5:56 AM   Result Value Ref Range    Magnesium 1.3 (L) 1.5 - 2.5 mg/dL   PHOSPHORUS    Collection Time: 06/12/22  5:56 AM   Result Value Ref Range    Phosphorus 2.9 2.5 - 4.5 mg/dL   proBrain Natriuretic Peptide, NT    Collection Time: 06/12/22  5:56 AM   Result Value Ref Range    NT-proBNP 494 (H) 0 - 125 pg/mL   CBC WITH DIFFERENTIAL    Collection Time: 06/12/22  5:56 AM   Result Value Ref Range    WBC 9.8 4.8 - 10.8 K/uL    RBC 3.70 (L) 4.70 - 6.10 M/uL    Hemoglobin 10.4 (L) 14.0 - 18.0 g/dL     Hematocrit 32.6 (L) 42.0 - 52.0 %    MCV 88.1 81.4 - 97.8 fL    MCH 28.1 27.0 - 33.0 pg    MCHC 31.9 (L) 33.7 - 35.3 g/dL    RDW 44.5 35.9 - 50.0 fL    Platelet Count 419 164 - 446 K/uL    MPV 9.8 9.0 - 12.9 fL    Neutrophils-Polys 76.90 (H) 44.00 - 72.00 %    Lymphocytes 11.00 (L) 22.00 - 41.00 %    Monocytes 6.90 0.00 - 13.40 %    Eosinophils 3.60 0.00 - 6.90 %    Basophils 0.50 0.00 - 1.80 %    Immature Granulocytes 1.10 (H) 0.00 - 0.90 %    Nucleated RBC 0.00 /100 WBC    Neutrophils (Absolute) 7.52 (H) 1.82 - 7.42 K/uL    Lymphs (Absolute) 1.07 1.00 - 4.80 K/uL    Monos (Absolute) 0.67 0.00 - 0.85 K/uL    Eos (Absolute) 0.35 0.00 - 0.51 K/uL    Baso (Absolute) 0.05 0.00 - 0.12 K/uL    Immature Granulocytes (abs) 0.11 0.00 - 0.11 K/uL    NRBC (Absolute) 0.00 K/uL   Comp Metabolic Panel    Collection Time: 06/12/22  5:56 AM   Result Value Ref Range    Sodium 139 135 - 145 mmol/L    Potassium 3.6 3.6 - 5.5 mmol/L    Chloride 107 96 - 112 mmol/L    Co2 21 20 - 33 mmol/L    Anion Gap 11.0 7.0 - 16.0    Glucose 134 (H) 65 - 99 mg/dL    Bun 28 (H) 8 - 22 mg/dL    Creatinine 1.07 0.50 - 1.40 mg/dL    Calcium 8.0 (L) 8.5 - 10.5 mg/dL    AST(SGOT) 23 12 - 45 U/L    ALT(SGPT) 10 2 - 50 U/L    Alkaline Phosphatase 195 (H) 30 - 99 U/L    Total Bilirubin 0.3 0.1 - 1.5 mg/dL    Albumin 2.2 (L) 3.2 - 4.9 g/dL    Total Protein 4.7 (L) 6.0 - 8.2 g/dL    Globulin 2.5 1.9 - 3.5 g/dL    A-G Ratio 0.9 g/dL   ESTIMATED GFR    Collection Time: 06/12/22  5:56 AM   Result Value Ref Range    GFR (CKD-EPI) 72 >60 mL/min/1.73 m 2   POCT glucose device results    Collection Time: 06/12/22  7:52 AM   Result Value Ref Range    POC Glucose, Blood 134 (H) 65 - 99 mg/dL   POCT glucose device results    Collection Time: 06/12/22 11:39 AM   Result Value Ref Range    POC Glucose, Blood 141 (H) 65 - 99 mg/dL   POCT glucose device results    Collection Time: 06/12/22  5:23 PM   Result Value Ref Range    POC Glucose, Blood 169 (H) 65 - 99 mg/dL   POCT  glucose device results    Collection Time: 06/12/22  9:16 PM   Result Value Ref Range    POC Glucose, Blood 151 (H) 65 - 99 mg/dL   POCT glucose device results    Collection Time: 06/13/22  6:24 AM   Result Value Ref Range    POC Glucose, Blood 149 (H) 65 - 99 mg/dL   POCT glucose device results    Collection Time: 06/13/22  7:35 AM   Result Value Ref Range    POC Glucose, Blood 133 (H) 65 - 99 mg/dL   POCT glucose device results    Collection Time: 06/13/22 11:34 AM   Result Value Ref Range    POC Glucose, Blood 172 (H) 65 - 99 mg/dL   POCT glucose device results    Collection Time: 06/13/22  5:28 PM   Result Value Ref Range    POC Glucose, Blood 160 (H) 65 - 99 mg/dL   POCT glucose device results    Collection Time: 06/13/22  8:30 PM   Result Value Ref Range    POC Glucose, Blood 184 (H) 65 - 99 mg/dL   POCT glucose device results    Collection Time: 06/14/22  7:17 AM   Result Value Ref Range    POC Glucose, Blood 132 (H) 65 - 99 mg/dL   POCT glucose device results    Collection Time: 06/14/22 11:37 AM   Result Value Ref Range    POC Glucose, Blood 201 (H) 65 - 99 mg/dL       Current Facility-Administered Medications   Medication Frequency   • vancomycin 50 mg/mL oral soln 125 mg Q6HRS   • Pharmacy Consult Request PHARMACY TO DOSE   • miconazole 2%-zinc oxide (Vinny) topical cream Q6HRS PRN   • insulin regular (HumuLIN R,NovoLIN R) injection 4X/DAY ACHS    And   • dextrose 50% (D50W) injection 25 g Q15 MIN PRN   • amLODIPine (NORVASC) tablet 10 mg Q DAY   • lidocaine (LIDODERM) 5 % 1 Patch Q24HR   • vitamin D3 (cholecalciferol) tablet 1,000 Units DAILY   • Respiratory Therapy Consult Continuous RT   • hydrALAZINE (APRESOLINE) tablet 25 mg Q8HRS PRN   • acetaminophen (Tylenol) tablet 650 mg Q4HRS PRN   • polyethylene glycol/lytes (MIRALAX) PACKET 1 Packet QDAY PRN    And   • magnesium hydroxide (MILK OF MAGNESIA) suspension 30 mL QDAY PRN    And   • bisacodyl (DULCOLAX) suppository 10 mg QDAY PRN   • omeprazole  (PRILOSEC) capsule 20 mg DAILY   • artificial tears ophthalmic solution 1 Drop PRN   • benzocaine-menthol (Cepacol) lozenge 1 Lozenge Q2HRS PRN   • mag hydrox-al hydrox-simeth (MAALOX PLUS ES or MYLANTA DS) suspension 20 mL Q2HRS PRN   • ondansetron (ZOFRAN ODT) dispertab 4 mg 4X/DAY PRN    Or   • ondansetron (ZOFRAN) syringe/vial injection 4 mg 4X/DAY PRN   • traZODone (DESYREL) tablet 50 mg QHS PRN   • sodium chloride (OCEAN) 0.65 % nasal spray 2 Spray PRN   • oxyCODONE immediate-release (ROXICODONE) tablet 5 mg Q3HRS PRN    Or   • oxyCODONE immediate release (ROXICODONE) tablet 10 mg Q3HRS PRN   • midazolam (VERSED) 5 mg/mL (1 mL vial) PRN   • acetaminophen (Tylenol) tablet 650 mg Q6HRS PRN   • allopurinol (ZYLOPRIM) tablet 100 mg QDAY   • atorvastatin (LIPITOR) tablet 80 mg Q EVENING   • clopidogrel (PLAVIX) tablet 75 mg QDAY   • FLUoxetine (PROZAC) capsule 40 mg DAILY   • gabapentin (NEURONTIN) capsule 100 mg QHS PRN   • losartan (COZAAR) tablet 50 mg BID   • metoprolol SR (TOPROL XL) tablet 100 mg Q EVENING   • tamsulosin (FLOMAX) capsule 0.4 mg DAILY       Orders Placed This Encounter   Procedures   • Diet Order Diet: Consistent CHO (Diabetic); Second Modifier: (optional): Cardiac     Standing Status:   Standing     Number of Occurrences:   1     Order Specific Question:   Diet:     Answer:   Consistent CHO (Diabetic) [4]     Order Specific Question:   Second Modifier: (optional)     Answer:   Cardiac [6]       Assessment:  Active Hospital Problems    Diagnosis    • *Acute encephalopathy    • Hyponatremia    • Dehydration    • Essential hypertension, benign    • Benign prostatic hyperplasia with urinary obstruction    • Diarrhea    • GERD with esophagitis    • Recurrent UTI    • Hypomagnesemia    • Type 2 diabetes mellitus, with long-term current use of insulin (HCC)    • Stage 3b chronic kidney disease (HCC)        Medical Decision Making and Plan:  Acute toxic encephalopathy - Patient with UTI with  urosepsis s/p IV antibiotics. Patient has urinary retention and requires occasional catheterization putting him at risk for recurrent UTIs. 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 On Plavix and statin  -Will start low dose Baclofen although most likely contracture. Serial casting to start 6/8, therapy holds otherwise for GI and leukocytosis. Most likely removal on 6/14. Will discontinue Baclofen as having some more confusion.    HTN/CAD - Patient on Plavix. Patient on Losartan 50 mg BID, Metoprolol 100 mg XL  -Consult Hospitalist. Recommending TTE     HLD - Patient on Atorvastatin 80 mg QHS     Leukocytosis - On treatment for UTI. On Ancef with recommendation to switch to Augment. Will check CBC in AM before switch  -Repeat 16.5 up from 16.1, consult Hospitalist. Improved to 12.7 on 6/5/22. Repeat 6/8 with Leukocytosis 17.9 and diffuse loose stools.   -Check C diff. Check UA. Check Blood cultures. KUB with distention and featureless colon. Discussed with hospitalist and start antibiotics including Flagyl. CT abdomen - possible small left cyst bleeding. Will monitor CBC. On Ceftriaxone. Improving leukocytosis 11.7 on 6/10/22    C Diff positive on 6/8. On Vancomycin PO. Improving. Still having loose BMs on 6/13/22. Per hospitalist continue Vancomycin for 10 days    Anemia - Check AM CBC - 12.2 Repeat 11.3, Fe low but with GI issues holding on starting 6/8     DM2 with hyperglycemia - Patient on Glargine 11 U and SSI  -Consult hospitalist     Depression - Patient on Fluoxetine 40 mg daily      Hx of Gout - Patient on Allopurinol 100 mg daily     Urinary Retention - Patient requiring catheterization ~1 time daily. Follows with Urology. Continue Flomax  -Hematuria on 6/7 with significant amount. Hold Xarelto. Flush bladder.   Improved hematuria     Vitamin D Deficiency - 29 on admission. Start 1000 U     DVT Ppx - Patient on Xarelto ppx dose. On hold for bleeding.     Total time:  26 minutes.  I spent greater than 50% of the time for patient care, counseling, and coordination on this date, including unit/floor time, and face-to-face time with the patient as per interval events and assessment and plan above. Topics discussed included reason for baclofen, discontinued baclofen, redo casting and make bivalve, and improving loose BMs, if solid for 2 days can come out of isolation.     Katheryn Pratt M.D.

## 2022-06-14 NOTE — THERAPY
Speech Language Pathology  Daily Treatment     Patient Name: Av Bob  Age:  77 y.o., Sex:  male  Medical Record #: [id number]  Today's Date: 6/14/2022     Precautions  Precautions: Fall Risk  Comments: L eric from previous CVA poor OOB tolerance, wounds on buttocks, positive cdiff    Subjective    Patient was willing to participate. Spouse present during session.      Objective       06/14/22 1502   Treatment Charges   SLP Cognitive Skill Development First 15 Minutes 1   SLP Cognitive Skill Development Additional 15 Minutes 1   SLP Total Time Spent   SLP Individual Total Time Spent (Mins) 30   Cognition   Simple Attention Moderate (3)   Orientation  Minimal (4)   Safety Awareness Minimal (4)       Assessment    Patient completed o-log with a final score of 23/30.  Patient was able to ID safe vs unsafe scenarios with 82% accuracy.     Plan    Continue to address orientation, safety, and recall       Speech Therapy Problems (Active)       Problem: Memory STGs       Dates: Start: 06/01/22         Goal: STG-Within one week, patient will remember safety precautions related to hospitalization with 75% accuracy.        Dates: Start: 06/01/22         Goal Note filed on 06/08/22 1452 by Harpal Williamson MS,CCC-SLP       Mod-max A for pt to recall new information related to his current hospitalization                  Problem: Problem Solving STGs       Dates: Start: 06/01/22         Goal: STG-Within one week, patient will complete o-log with a final score of 25/30 over three consecutive days       Dates: Start: 06/01/22         Goal Note filed on 06/08/22 1452 by Harpal Williamson MS,CCC-SLP       Pt's highest O-log score to date was 23/30                  Problem: Speech/Swallowing LTGs       Dates: Start: 06/01/22         Goal: LTG-By discharge, patient will solve basic problems in order to d/c home with SPV       Dates: Start: 06/01/22

## 2022-06-14 NOTE — PROGRESS NOTES
[facility] Medicine Daily Progress Note    Date of Service  6/14/2022    Chief Complaint:  Hypertension  Diabetes  Leukocytosis    Interval History:  No complaints.  Doing ok.    Review of Systems  Review of Systems   Constitutional: Negative for chills and fever.   Respiratory: Negative for shortness of breath.    Cardiovascular: Negative for chest pain.   Gastrointestinal: Negative for abdominal pain, diarrhea, nausea and vomiting.   Psychiatric/Behavioral: The patient is not nervous/anxious.         Physical Exam  Temp:  [36.6 °C (97.9 °F)-36.8 °C (98.3 °F)] 36.8 °C (98.3 °F)  Pulse:  [60] 60  Resp:  [17-18] 17  BP: (122-149)/(60-80) 122/60  SpO2:  [94 %-97 %] 94 %    Physical Exam  Vitals and nursing note reviewed.   Constitutional:       Appearance: Normal appearance.   HENT:      Head: Atraumatic.   Eyes:      Conjunctiva/sclera: Conjunctivae normal.      Pupils: Pupils are equal, round, and reactive to light.   Cardiovascular:      Rate and Rhythm: Normal rate and regular rhythm.      Heart sounds: No murmur heard.  Pulmonary:      Effort: Pulmonary effort is normal.      Breath sounds: No stridor. No wheezing or rales.   Abdominal:      General: There is no distension.      Palpations: Abdomen is soft.      Tenderness: There is no abdominal tenderness.   Musculoskeletal:      Cervical back: Normal range of motion and neck supple.      Right lower leg: No edema.      Left lower leg: No edema.   Skin:     General: Skin is warm and dry.      Findings: No rash.   Neurological:      Mental Status: He is alert and oriented to person, place, and time.   Psychiatric:         Mood and Affect: Mood normal.         Behavior: Behavior normal.         Fluids    Intake/Output Summary (Last 24 hours) at 6/14/2022 0848  Last data filed at 6/13/2022 2032  Gross per 24 hour   Intake 240 ml   Output --   Net 240 ml       Laboratory  Recent Labs     06/12/22  0556   WBC 9.8   RBC 3.70*   HEMOGLOBIN 10.4*   HEMATOCRIT 32.6*   MCV  88.1   MCH 28.1   MCHC 31.9*   RDW 44.5   PLATELETCT 419   MPV 9.8     Recent Labs     22  0556   SODIUM 139   POTASSIUM 3.6   CHLORIDE 107   CO2 21   GLUCOSE 134*   BUN 28*   CREATININE 1.07   CALCIUM 8.0*                   Imaging    Assessment/Plan  Gout  Assessment & Plan  On Allopurinol    Hypokalemia  Assessment & Plan  K+: 3.6 ()  Off supplements  Note: pt has a hx of hypo-K+ (unknown reason) and takes supplements at home  Monitor    C. difficile colitis  Assessment & Plan  C. Diff (+)  S/P leukocytosis  On oral Vanco (thru )  Last few BMs were formed    Vitamin D insufficiency  Assessment & Plan  Vit D: 29  On supplements    Azotemia  Assessment & Plan  Bun: 28  Encouraging fluid intake  Monitor    Essential hypertension, benign- (present on admission)  Assessment & Plan  BP a little labile  On Norvasc  On Cozaar  On Toprol XL  Note: on Flomax  Cont to monitor    Benign prostatic hyperplasia with urinary obstruction- (present on admission)  Assessment & Plan  S/P TURP in 2022 w/ Dr. Barry  Gross hematuria resolved w/ CBI x 24 hours on 22  Pt's wife reports that Urology told her to expect intermittent hematuria for months following TURP  But suspect bleeding may have been from left kidney hemorrhage (see CT Abd/Pelvis 22)  Needs  F/U    Normocytic anemia- (present on admission)  Assessment & Plan  Hb: 10.4  Fe 20, sats 13%  Holding off on starting supplements until GI issues resolved    GERD with esophagitis- (present on admission)  Assessment & Plan  On Prilosec    Recurrent UTI- (present on admission)  Assessment & Plan  Has hx of recurrent UTI  Was adm to [facility] for UTI, diarrhea, and confusion  S/P recent UTI, s/p abx  Note: pt self caths at home    Depression  Assessment & Plan  On Prozac    Hypomagnesemia- (present on admission)  Assessment & Plan  M.3 ()  Off supplements -- until GI issues are resolved  Note: pt has a hx of hypo-mg (unknown reason) and takes  supplements at home  Monitor    H/O Cerebrovascular accident (CVA) in adulthood- (present on admission)  Assessment & Plan  On Plavix and Lipitor    Type 2 diabetes mellitus, with long-term current use of insulin (HCC)- (present on admission)  Assessment & Plan  Hba1c: 6.6 (6/1)  BS: 132-184  Off Glargine (had recent lower BS)  Note: home meds include Trulicity 3 mg weekly, Toujeo 5-12 units daily, Humalog 5 units base and per SS  Cont to monitor

## 2022-06-14 NOTE — THERAPY
Physical Therapy   Daily Treatment     Patient Name: Av Bob  Age:  77 y.o., Sex:  male  Medical Record #: [id number]  Today's Date: 6/14/2022     Precautions  Precautions: (P) Fall Risk  Comments: (P) L eric from previous CVA poor OOB tolerance, wounds on buttocks, positive cdiff    Subjective    Patient in bed and agreeable to cast removal, wife present for session.     Objective       06/14/22 1031   PT Charge Group   PT Therapeutic Activities 4   PT Total Time Spent   PT Individual Total Time Spent (Mins) 60   Precautions   Precautions Fall Risk   Comments L eric from previous CVA poor OOB tolerance, wounds on buttocks, positive cdiff   Transfer Functional Level of Assist   Bed, Chair, Wheelchair Transfer Total Assist X 2  (Max Ax1, SBAx1 for safety)   Bed Chair Wheelchair Transfer Description Increased time;Initial preparation for task;Squat pivot transfer to wheelchair  (squat pivot transfer to L bed>w/c)   Bed Mobility    Supine to Sit Maximal Assist   Scooting Maximal Assist   Rolling Moderate Assist to Rt.;Moderate Assist to Lt.   Interdisciplinary Plan of Care Collaboration   IDT Collaboration with  Family / Caregiver;Nursing;Occupational Therapist   Patient Position at End of Therapy Seated;Self Releasing Lap Belt Applied;Call Light within Reach;Tray Table within Reach;Phone within Reach;Family / Friend in Room   Collaboration Comments cast removal and skin protection, CLOF     LUE cast removal in supine, patient and wife educated on process and maintaining skin integrity as patient with tendency to itch skin and cause skin tears.            OT assisting with ROM and tone management. LUE ROM as follows:   Resting position:  30 degrees elbow flexion      Neutral wrist position      Slightly flexed fingers however easily movable   Post-cast removal: 120 degrees elbow flexion      -40 degrees elbow extension      60 degrees wrist flexion      18 degrees wrist extension      Neutral  fingers          Assessment    Patient tolerated session well, mild skin irritations after cast removal due to patient itching skin; nursing notified and lotion applied. Gained 30 degrees of elbow extension after initial casting. Agreeable to sitting up for lunch after session.    Strengths: Able to follow instructions, Motivated for self care and independence, Pleasant and cooperative, Supportive family, Willingly participates in therapeutic activities  Barriers: Bladder incontinence, Decreased endurance, Hemiparesis, Impaired activity tolerance, Impaired balance, Limited mobility    Plan    **unable to leave room due to positive cdiff** Bed mobility training, transfer training (squat pivot currently), standing tolerance/balance, gait training (currently using R wall rail), ROM/stretching, neuro re-ed for L hemibody, encourage anterior weight shifting/leaning, serial casting to LUE applied on 6/8/22 and removed on 6/14/22. Plan to recast on Friday 6/17/22- please discourage patient from scratching skin.     Passport items to be completed:  Get in/out of bed safely, in/out of a vehicle, safely use mobility device, walk or wheel around home/community, navigate up and down stairs, show how to get up/down from the ground, ensure home is accessible, demonstrate HEP, complete caregiver training      Physical Therapy Problems (Active)       Problem: Mobility       Dates: Start: 06/01/22         Goal: STG-Within one week, patient will propel wheelchair household distances 50 ft with Min A.       Dates: Start: 06/01/22               Problem: Mobility Transfers       Dates: Start: 06/01/22         Goal: STG-Within one week, patient will perform bed mobility with Mod A and bed functions as needed.       Dates: Start: 06/01/22            Goal: STG-Within one week, patient will transfer bed to chair with Max Ax1 via squat/stand pivot.       Dates: Start: 06/01/22               Problem: PT-Long Term Goals       Dates: Start:  06/01/22         Goal: LTG-By discharge, patient will propel wheelchair 50 ft mod I.       Dates: Start: 06/01/22            Goal: LTG-By discharge, patient will ambulate 50 ft with LRAD and Min A.       Dates: Start: 06/01/22            Goal: LTG-By discharge, patient will transfer one surface to another with CGA and LRAD.       Dates: Start: 06/01/22            Goal: LTG-By discharge, patient will transfer in/out of a car with CGA and LRAD.       Dates: Start: 06/01/22            Goal: LTG-By discharge, patient will perform bed mobility independently.       Dates: Start: 06/01/22

## 2022-06-14 NOTE — CARE PLAN
"The patient is Watcher - Medium risk of patient condition declining or worsening    Shift Goals  Clinical Goals: Safety and comfort  Patient Goals: Comfort    Progress made toward(s) clinical / shift goals:    Problem: Fall Risk - Rehab  Goal: Patient will remain free from falls  Note: Camilla Gruber Fall risk Assessment Score: 20    High fall risk Interventions   - Alarming seatbelt  - Bed and strip alarm   - Yellow sign by the door   - Yellow wrist band \"Fall risk\"  - Room near to the nurse station  - Do not leave patient unattended in the bathroom  - Fall risk education provided        Problem: Bowel Elimination  Goal: Patient will participate in bowel management program  Note: Pt incontinent of bowel, kept clean and dry.            "

## 2022-06-14 NOTE — THERAPY
Occupational Therapy  Daily Treatment     Patient Name: Av Bob  Age:  77 y.o., Sex:  male  Medical Record #: [id number]  Today's Date: 6/14/2022     Precautions  Precautions: Fall Risk  Comments: L eric from previous CVA poor OOB tolerance, wounds on buttocks, positive cdiff    Safety   ADL Safety : Requires Physical Assist for Safety (2 person)    Subjective    Pt agreeable to activities in bed     Objective       06/14/22 1231   OT Charge Group   OT Neuromuscular Re-education / Balance 2   OT Therapeutic Exercise  2   OT Total Time Spent   OT Individual Total Time Spent (Mins) 60   Precautions   Precautions Fall Risk   Comments L eric from previous CVA poor OOB tolerance, wounds on buttocks, positive cdiff   Supine Upper Body Exercises   Chest Press 3 sets of 10;Bilateral  (2# dowel, Assist w/ LUE)   Interdisciplinary Plan of Care Collaboration   IDT Collaboration with  Family / Caregiver;Occupational Therapist;Physical Therapist   Patient Position at End of Therapy In Bed;Phone within Reach;Family / Friend in Room;Tray Table within Reach;Call Light within Reach   Collaboration Comments pt's wife present throughout session; CLOF   Pt participated in AAROM and PROM of L shoulder, elbow, wrist, and fingers. Pt reported discomfort when flexing elbow past 100*. Pt demonstrated increased ROM in elbow, wirst, and fingers compared to pre cast measurements.     Pt used theraball and 2# dowel to complete bilateral shoulder extension, chest press, and elbow flexion w/ max-total A for LUE. Pt demonstrated muscle contractions in all muscle groups inconstantly throughout session.     Pt and pt's wife were educated on resting hand splint to assist w/ positioning of L hand while cont to use hand when splint is off.     Assessment    Pt tolerated session fair with focus on LUE function/mobility. Pt reported he was too tired to get out of bed for session, but was agreeable to complete exercises and stretching in bed.  began to fall asleep skilled nursing through session and was unable to follow one step commands after that. Pt wife was present throughout session and was receptive to education on resting hand splints.   Strengths: Motivated for self care and independence, Pleasant and cooperative, Supportive family, Willingly participates in therapeutic activities  Barriers: Confused, Decreased endurance, Fatigue, Generalized weakness, Impaired activity tolerance, Impaired balance, Impaired functional cognition, Limited mobility    Plan    Monitor LUE (skin/circulation, comfort) -serial casting removed on 6/14  Trial compression glove for L hand swelling, provide options for LUE resting hand splint     Pressure sore prevention/mgmt, Attention to task, ADLs, rolling, 1 step directions, anterior weight shift L and R for progression of functional transfers, seated balance, STS,    Occupational Therapy Goals (Active)       Problem: Bathing       Dates: Start: 06/01/22         Goal: STG-Within one week, patient will bathe w/ max A using DME as needed.        Dates: Start: 06/01/22         Goal Note filed on 06/09/22 1142 by Selene Elliott OT       Pt requires Total A                 Problem: Dressing       Dates: Start: 06/01/22         Goal: STG-Within one week, patient will dress UB w/ mod A.       Dates: Start: 06/01/22         Goal Note filed on 06/09/22 1142 by Selene Elliott OT       Pt requires Max A              Goal: STG-Within one week, patient will dress LB w/ mod A.        Dates: Start: 06/01/22         Goal Note filed on 06/09/22 1142 by Selene Elliott OT       Pt requires Total Ax 2 in bed and from w/c level                 Problem: Functional Transfers       Dates: Start: 06/01/22         Goal: STG-Within one week, patient will transfer to toilet w/ max A using DME as needed.       Dates: Start: 06/01/22         Goal Note filed on 06/09/22 1142 by Selene Elliott OT       Pt requires Total x2               Goal: STG-Within one week, patient will transfer to step in shower with max A using DME as needed.       Dates: Start: 06/01/22         Goal Note filed on 06/09/22 1142 by Selene Elliott OT       Pt requires Total x2 for walk in shower                 Problem: OT Long Term Goals       Dates: Start: 06/01/22         Goal: LTG-By discharge, patient will complete basic self care tasks with min A using DME and AE as needed.       Dates: Start: 06/01/22            Goal: LTG-By discharge, patient will perform bathroom transfers w/ min A using DME and AE as needed.       Dates: Start: 06/01/22               Problem: Toileting       Dates: Start: 06/01/22         Goal: STG-Within one week, patient will complete toileting tasks w/ max A using DME as needed.        Dates: Start: 06/01/22         Goal Note filed on 06/09/22 1142 by Selene Elliott OT       Pt requires Total x2

## 2022-06-14 NOTE — CARE PLAN
The patient is Stable - Low risk of patient condition declining or worsening    Shift Goals  Clinical Goals: Safety and comfort  Patient Goals: Comfort    Progress made toward(s) clinical / shift goals:  progressing      Problem: Bowel Elimination  Goal: Patient will participate in bowel management program  Note: Pt on isolation for cdiff , has medium soft bm this shift.      Problem: Skin Integrity  Goal: Patient's skin integrity will be maintained or improve  Note: Pt has skin breakdown in sacrum , barrier paste applied.

## 2022-06-15 LAB
GLUCOSE BLD STRIP.AUTO-MCNC: 127 MG/DL (ref 65–99)
GLUCOSE BLD STRIP.AUTO-MCNC: 132 MG/DL (ref 65–99)
GLUCOSE BLD STRIP.AUTO-MCNC: 177 MG/DL (ref 65–99)
GLUCOSE BLD STRIP.AUTO-MCNC: 265 MG/DL (ref 65–99)

## 2022-06-15 PROCEDURE — A9270 NON-COVERED ITEM OR SERVICE: HCPCS | Performed by: PHYSICAL MEDICINE & REHABILITATION

## 2022-06-15 PROCEDURE — 770010 HCHG ROOM/CARE - REHAB SEMI PRIVAT*

## 2022-06-15 PROCEDURE — 99233 SBSQ HOSP IP/OBS HIGH 50: CPT | Performed by: PHYSICAL MEDICINE & REHABILITATION

## 2022-06-15 PROCEDURE — 97530 THERAPEUTIC ACTIVITIES: CPT

## 2022-06-15 PROCEDURE — 99232 SBSQ HOSP IP/OBS MODERATE 35: CPT | Performed by: HOSPITALIST

## 2022-06-15 PROCEDURE — 700101 HCHG RX REV CODE 250: Performed by: PHYSICAL MEDICINE & REHABILITATION

## 2022-06-15 PROCEDURE — 97130 THER IVNTJ EA ADDL 15 MIN: CPT

## 2022-06-15 PROCEDURE — 700102 HCHG RX REV CODE 250 W/ 637 OVERRIDE(OP): Performed by: PHYSICAL MEDICINE & REHABILITATION

## 2022-06-15 PROCEDURE — 97129 THER IVNTJ 1ST 15 MIN: CPT

## 2022-06-15 PROCEDURE — 97535 SELF CARE MNGMENT TRAINING: CPT

## 2022-06-15 PROCEDURE — 700102 HCHG RX REV CODE 250 W/ 637 OVERRIDE(OP): Performed by: HOSPITALIST

## 2022-06-15 PROCEDURE — A9270 NON-COVERED ITEM OR SERVICE: HCPCS | Performed by: HOSPITALIST

## 2022-06-15 PROCEDURE — 82962 GLUCOSE BLOOD TEST: CPT

## 2022-06-15 RX ADMIN — AMLODIPINE BESYLATE 10 MG: 5 TABLET ORAL at 05:50

## 2022-06-15 RX ADMIN — TRAMADOL HYDROCHLORIDE 50 MG: 50 TABLET, COATED ORAL at 08:04

## 2022-06-15 RX ADMIN — TAMSULOSIN HYDROCHLORIDE 0.4 MG: 0.4 CAPSULE ORAL at 08:04

## 2022-06-15 RX ADMIN — FLUOXETINE 40 MG: 20 CAPSULE ORAL at 08:04

## 2022-06-15 RX ADMIN — ATORVASTATIN CALCIUM 80 MG: 40 TABLET, FILM COATED ORAL at 21:53

## 2022-06-15 RX ADMIN — OMEPRAZOLE 20 MG: 20 CAPSULE, DELAYED RELEASE ORAL at 08:04

## 2022-06-15 RX ADMIN — VANCOMYCIN HYDROCHLORIDE 125 MG: KIT ORAL at 05:50

## 2022-06-15 RX ADMIN — VANCOMYCIN HYDROCHLORIDE 125 MG: KIT ORAL at 11:30

## 2022-06-15 RX ADMIN — LIDOCAINE 1 PATCH: 50 PATCH TOPICAL at 14:15

## 2022-06-15 RX ADMIN — VANCOMYCIN HYDROCHLORIDE 125 MG: KIT ORAL at 23:09

## 2022-06-15 RX ADMIN — CLOPIDOGREL 75 MG: 75 TABLET, FILM COATED ORAL at 05:51

## 2022-06-15 RX ADMIN — VANCOMYCIN HYDROCHLORIDE 125 MG: KIT ORAL at 17:11

## 2022-06-15 RX ADMIN — ALLOPURINOL 100 MG: 100 TABLET ORAL at 05:51

## 2022-06-15 RX ADMIN — Medication 1000 UNITS: at 08:04

## 2022-06-15 ASSESSMENT — ENCOUNTER SYMPTOMS
HALLUCINATIONS: 0
NAUSEA: 0
PALPITATIONS: 0
SHORTNESS OF BREATH: 0
VOMITING: 0
BLURRED VISION: 0
HEADACHES: 0
DIZZINESS: 0
FEVER: 0

## 2022-06-15 ASSESSMENT — PAIN DESCRIPTION - PAIN TYPE: TYPE: ACUTE PAIN

## 2022-06-15 NOTE — THERAPY
Occupational Therapy  Daily Treatment     Patient Name: Av Bob  Age:  77 y.o., Sex:  male  Medical Record #: [id number]  Today's Date: 6/15/2022     Precautions  Precautions: (P) Fall Risk  Comments: (P) L eric from previous CVA poor OOB tolerance, wounds on buttocks, positive cdiff    Safety   ADL Safety : Requires Physical Assist for Safety (2 person)    Subjective  Pt agreeable to OT session     Objective       06/15/22 0701   OT Charge Group   OT Self Care / ADL 3   OT Therapy Activity 1   OT Total Time Spent   OT Individual Total Time Spent (Mins) 60   Precautions   Precautions Fall Risk   Comments L eric from previous CVA poor OOB tolerance, wounds on buttocks, positive cdiff   Functional Level of Assist   Upper Body Dressing Moderate Assist   Upper Body Dressing Description Increased time;Initial preparation for task;Assit with threading arms through sleeves  (step by step VCs)   Lower Body Dressing Total Assist x 2   Lower Body Dressing Description Grab bar;Assist with threading into pant leg;Set-up of equipment;Increased time;Initial preparation for task;Verbal cueing  (standing at GB in bathroom)   Toileting Total Assist x 2  (bowel incontients)   Toileting Description Assist for hygiene;Assist to pull pants up;Assist to pull pants down;Increased time;Initial preparation for task  (rolling supine in bed)   Bed, Chair, Wheelchair Transfer Total Assist X 2  (Max off 1, min of 1)   Bed Chair Wheelchair Transfer Description Increased time;Requires lift;Set-up of equipment;Squat pivot transfer to wheelchair;Verbal cueing   Interdisciplinary Plan of Care Collaboration   IDT Collaboration with  Nursing;Therapy Tech   Patient Position at End of Therapy In Bed;Tray Table within Reach;Call Light within Reach;Phone within Reach   Collaboration Comments medication; assist w/ care   While seated in w/c pt participated in PROM and stretching of LUE to maintain ROM gained from serial casting. While laying  in bed pt's LUE was flexed ~120* at elbow and 90* at wrist. Post stretching pt LUE was relaxed at *80* flexion at elbow and wrist and fingers in neutral.     Pt reported increased pain in buttocks after seated for 20 min. Pt compete 5 sit>stands at GB w/ max A for pressure relief. Pt was incontinent of bowel during sit>stands and required 2 person assist to transfer to bed and change brief by rolling.     Assessment    Pt tolerated session well focused on ADLs and LUE stretching. Pt's LUE had increased tone in bicep compared to yesterday resulting in increased flexion and pain while stretching. Post stretching pt was comfortable with keeping LUE extended past 90* while seated. Pt was educated on positioning of LUE while seated and laying in bed. Pt cont to be unaware of bowel and bladder urges.   Strengths: Motivated for self care and independence, Pleasant and cooperative, Supportive family, Willingly participates in therapeutic activities  Barriers: Confused, Decreased endurance, Fatigue, Generalized weakness, Impaired activity tolerance, Impaired balance, Impaired functional cognition, Limited mobility    Plan    Monitor LUE (skin/circulation, comfort) -serial casting removed on 6/14  Trial compression glove for L hand swelling, provide options for LUE resting hand splint     Pressure sore prevention/mgmt, Attention to task, ADLs, rolling, 1 step directions, anterior weight shift L and R for progression of functional transfers, seated balance, STS,    Occupational Therapy Goals (Active)       Problem: Bathing       Dates: Start: 06/01/22         Goal: STG-Within one week, patient will bathe w/ max A using DME as needed.        Dates: Start: 06/01/22         Goal Note filed on 06/09/22 1142 by Selene Elliott OT       Pt requires Total A                 Problem: Dressing       Dates: Start: 06/01/22         Goal: STG-Within one week, patient will dress UB w/ mod A.       Dates: Start: 06/01/22         Goal  Note filed on 06/09/22 1142 by Selene Elliott OT       Pt requires Max A              Goal: STG-Within one week, patient will dress LB w/ mod A.        Dates: Start: 06/01/22         Goal Note filed on 06/09/22 1142 by Selene Elliott OT       Pt requires Total Ax 2 in bed and from w/c level                 Problem: Functional Transfers       Dates: Start: 06/01/22         Goal: STG-Within one week, patient will transfer to toilet w/ max A using DME as needed.       Dates: Start: 06/01/22         Goal Note filed on 06/09/22 1142 by Selene Elliott OT       Pt requires Total x2              Goal: STG-Within one week, patient will transfer to step in shower with max A using DME as needed.       Dates: Start: 06/01/22         Goal Note filed on 06/09/22 1142 by Selene Elliott OT       Pt requires Total x2 for walk in shower                 Problem: OT Long Term Goals       Dates: Start: 06/01/22         Goal: LTG-By discharge, patient will complete basic self care tasks with min A using DME and AE as needed.       Dates: Start: 06/01/22            Goal: LTG-By discharge, patient will perform bathroom transfers w/ min A using DME and AE as needed.       Dates: Start: 06/01/22               Problem: Toileting       Dates: Start: 06/01/22         Goal: STG-Within one week, patient will complete toileting tasks w/ max A using DME as needed.        Dates: Start: 06/01/22         Goal Note filed on 06/09/22 1142 by Selene Elliott OT       Pt requires Total x2

## 2022-06-15 NOTE — PROGRESS NOTES
[facility] Medicine Daily Progress Note    Date of Service  6/15/2022    Chief Complaint:  Hypertension  Diabetes  Leukocytosis    Interval History:  No significant events overnight.    Review of Systems  Review of Systems   Constitutional: Negative for fever.   Eyes: Negative for blurred vision.   Respiratory: Negative for shortness of breath.    Cardiovascular: Negative for palpitations.   Gastrointestinal: Negative for nausea and vomiting.   Neurological: Negative for dizziness and headaches.   Psychiatric/Behavioral: Negative for hallucinations.        Physical Exam  Temp:  [36.6 °C (97.9 °F)-36.8 °C (98.2 °F)] 36.8 °C (98.2 °F)  Pulse:  [70-78] 70  Resp:  [18-20] 18  BP: (111-145)/(53-80) 111/53  SpO2:  [95 %-96 %] 95 %    Physical Exam  Vitals and nursing note reviewed.   Constitutional:       General: He is not in acute distress.  HENT:      Mouth/Throat:      Mouth: Mucous membranes are moist.      Pharynx: Oropharynx is clear.   Eyes:      General: No scleral icterus.  Cardiovascular:      Rate and Rhythm: Normal rate and regular rhythm.      Heart sounds: No murmur heard.  Pulmonary:      Effort: Pulmonary effort is normal.      Breath sounds: Normal breath sounds. No stridor.   Abdominal:      General: There is no distension.      Palpations: Abdomen is soft.      Tenderness: There is no abdominal tenderness.   Musculoskeletal:      Cervical back: No rigidity.      Right lower leg: No edema.      Left lower leg: No edema.   Skin:     General: Skin is warm and dry.      Findings: No rash.   Neurological:      Mental Status: He is alert and oriented to person, place, and time.   Psychiatric:         Mood and Affect: Mood normal.         Behavior: Behavior normal.         Fluids    Intake/Output Summary (Last 24 hours) at 6/15/2022 0843  Last data filed at 6/14/2022 1834  Gross per 24 hour   Intake 680 ml   Output --   Net 680 ml       Laboratory                         Imaging    Assessment/Plan  Gout  Assessment & Plan  On Allopurinol    Hypokalemia  Assessment & Plan  K+: 3.6 ()  Off supplements  Note: pt has a hx of hypo-K+ (unknown reason) and takes supplements at home  Monitor    C. difficile colitis  Assessment & Plan  C. Diff (+)  S/P leukocytosis  S/P diarrhea -- BM's recently formed  On oral Vanco (thru )    Vitamin D insufficiency  Assessment & Plan  Vit D: 29  On supplements    Azotemia  Assessment & Plan  Bun: 28  Encouraging fluid intake  Monitor    Essential hypertension, benign- (present on admission)  Assessment & Plan  BP a little labile but ok  On Norvasc  On Cozaar  On Toprol XL  Note: on Flomax  Cont to monitor    Benign prostatic hyperplasia with urinary obstruction- (present on admission)  Assessment & Plan  S/P TURP in 2022 w/ Dr. Barry  Gross hematuria resolved w/ CBI x 24 hours on 22  Pt's wife reports that Urology told her to expect intermittent hematuria for months following TURP  But suspect bleeding may have been from left kidney hemorrhage (see CT Abd/Pelvis 22)  Needs  F/U    Normocytic anemia- (present on admission)  Assessment & Plan  Hb: 10.4  Fe 20, sats 13%  Holding off on starting supplements until GI issues resolved    GERD with esophagitis- (present on admission)  Assessment & Plan  On Prilosec    Recurrent UTI- (present on admission)  Assessment & Plan  Has hx of recurrent UTI  Was adm to [facility] for UTI, diarrhea, and confusion  S/P recent UTI, s/p abx  Note: pt self caths at home    Depression  Assessment & Plan  On Prozac    Hypomagnesemia- (present on admission)  Assessment & Plan  M.3 ()  Off supplements -- until GI issues are resolved  Note: pt has a hx of hypo-mg (unknown reason) and takes supplements at home  Monitor    H/O Cerebrovascular accident (CVA) in adulthood- (present on admission)  Assessment & Plan  On Plavix and Lipitor    Type 2 diabetes mellitus, with long-term current use of insulin  ([facility])- (present on admission)  Assessment & Plan  Hba1c: 6.6 (6/1)  BS: 127-184 (hit 201 x 1)  Off Glargine (had recent lower BS)  Note: home meds include Trulicity 3 mg weekly, Toujeo 5-12 units daily, Humalog 5 units base and per SS  Cont to monitor

## 2022-06-15 NOTE — THERAPY
Speech Language Pathology  Daily Treatment     Patient Name: Av Bob  Age:  77 y.o., Sex:  male  Medical Record #: [id number]  Today's Date: 6/15/2022     Precautions  Precautions: Fall Risk  Comments: L eric from previous CVA poor OOB tolerance, wounds on buttocks, positive cdiff    Subjective    Patient was in room at time of ST.      Objective       06/15/22 1302   Treatment Charges   SLP Cognitive Skill Development First 15 Minutes 1   SLP Cognitive Skill Development Additional 15 Minutes 1   SLP Total Time Spent   SLP Individual Total Time Spent (Mins) 30   Cognition   Orientation  Minimal (4)   Functional Memory Activities Moderate (3)       Assessment    O-log completed with a final score of 19/30.  Mod cues needed for verbal sequencing of 3-4 steps.       Plan    Orientation, basic attention and problem solving.       Speech Therapy Problems (Active)       Problem: Memory STGs       Dates: Start: 06/01/22         Goal: STG-Within one week, patient will remember safety precautions related to hospitalization with 75% accuracy.        Dates: Start: 06/01/22         Goal Note filed on 06/08/22 1452 by Harpal Williamson MS,CCC-SLP       Mod-max A for pt to recall new information related to his current hospitalization                  Problem: Problem Solving STGs       Dates: Start: 06/01/22         Goal: STG-Within one week, patient will complete o-log with a final score of 25/30 over three consecutive days       Dates: Start: 06/01/22         Goal Note filed on 06/08/22 1452 by Harpal Williamson MS,CCC-SLP       Pt's highest O-log score to date was 23/30                  Problem: Speech/Swallowing LTGs       Dates: Start: 06/01/22         Goal: LTG-By discharge, patient will solve basic problems in order to d/c home with SPV       Dates: Start: 06/01/22

## 2022-06-15 NOTE — THERAPY
Physical Therapy   Daily Treatment     Patient Name: Av Bob  Age:  77 y.o., Sex:  male  Medical Record #: [id number]  Today's Date: 6/15/2022     Precautions  Precautions: Fall Risk  Comments: L eric from previous CVA poor OOB tolerance, wounds on buttocks, positive cdiff    Subjective    Pt was supine in bed upon arrival and agreeable to treatment.  Pt's spouse present during session.  Pt with initial report of significant pain in buttocks from pressure lying in bed, which improved with repositioning.      Objective     06/15/22 0901   PT Charge Group   PT Therapeutic Activities 4   PT Total Time Spent   PT Individual Total Time Spent (Mins) 60   Precautions   Precautions Fall Risk   Bed Mobility    Supine to Sit Minimal Assist  (assist for LLE and very slight assist at trunk using R rail and HOB features)   Sit to Supine Minimal Assist  (for LLE using bed rail, with bed flat)   Sit to Stand Moderate Assist   Scooting Moderate Assist   Rolling Moderate Assist to Rt.;Moderate Assist to Lt.   Interdisciplinary Plan of Care Collaboration   IDT Collaboration with  Occupational Therapist   Patient Position at End of Therapy In Bed;Call Light within Reach;Tray Table within Reach;Phone within Reach;Family / Friend in Room   Collaboration Comments CLOF     Assisted pt into L sidelying position upon arrival for pain management and pressure relief.    Assessed buttock wound with pt's spouse with pt's spouse reported that wound looked better.    Completed bed mobility training with focus on increased time to allow for processing, use of bed features initially for increased independence, and review with spouse on pt's prior level of function and sequencing with bed mobility and transfers.      STS training with therapy tech positioned in place where pt has transfer pole at home.  Pt was able to stand using HH support from tech and max A x 1 rep and mod A x 1 rep at hips.  Bed height elevated during practice.      Completed manual stretching to LUE: scapular mobilization x 20 reps, manual pec stretch, elbow extension stretching, wrist extension stretching, digit stretching all x 60 sec each with discussion with pt's spouse on prevention of secondary complications/contracture management/serial casting.    Assessment    Pt demonstrated improvement in bed mobility this session with extra time to process and use of bed features.  Pt continue to be limited d/t pain from pressure injury and fatigue.    Strengths: Able to follow instructions, Motivated for self care and independence, Pleasant and cooperative, Supportive family, Willingly participates in therapeutic activities  Barriers: Bladder incontinence, Decreased endurance, Hemiparesis, Impaired activity tolerance, Impaired balance, Limited mobility    Plan    **unable to leave room due to positive cdiff** Bed mobility training, transfer training (squat pivot currently), standing tolerance/balance, gait training (currently using R wall rail), ROM/stretching, neuro re-ed for L hemibody, encourage anterior weight shifting/leaning, serial casting to LUE applied on 6/8/22 and removed on 6/14/22. Plan to recast on Friday 6/17/22- please discourage patient from scratching skin.     Passport items to be completed:  Get in/out of bed safely, in/out of a vehicle, safely use mobility device, walk or wheel around home/community, navigate up and down stairs, show how to get up/down from the ground, ensure home is accessible, demonstrate HEP, complete caregiver training    Physical Therapy Problems (Active)       Problem: Mobility       Dates: Start: 06/01/22         Goal: STG-Within one week, patient will propel wheelchair household distances 50 ft with Min A.       Dates: Start: 06/01/22               Problem: Mobility Transfers       Dates: Start: 06/01/22         Goal: STG-Within one week, patient will perform bed mobility with Mod A and bed functions as needed.       Dates: Start:  06/01/22            Goal: STG-Within one week, patient will transfer bed to chair with Max Ax1 via squat/stand pivot.       Dates: Start: 06/01/22               Problem: PT-Long Term Goals       Dates: Start: 06/01/22         Goal: LTG-By discharge, patient will propel wheelchair 50 ft mod I.       Dates: Start: 06/01/22            Goal: LTG-By discharge, patient will ambulate 50 ft with LRAD and Min A.       Dates: Start: 06/01/22            Goal: LTG-By discharge, patient will transfer one surface to another with CGA and LRAD.       Dates: Start: 06/01/22            Goal: LTG-By discharge, patient will transfer in/out of a car with CGA and LRAD.       Dates: Start: 06/01/22            Goal: LTG-By discharge, patient will perform bed mobility independently.       Dates: Start: 06/01/22

## 2022-06-15 NOTE — THERAPY
Physical Therapy   Daily Treatment     Patient Name: vA Bob  Age:  77 y.o., Sex:  male  Medical Record #: [id number]  Today's Date: 6/15/2022     Precautions  Precautions: Fall Risk  Comments: L eric from previous CVA poor OOB tolerance, wounds on buttocks, positive cdiff    Subjective    Pt was supine in bed upon arrival and agreeable to treatment.      Objective       06/15/22 1501   PT Charge Group   PT Therapeutic Activities 2   PT Total Time Spent   PT Individual Total Time Spent (Mins) 30   Precautions   Precautions Fall Risk   Bed Mobility    Supine to Sit Minimal Assist  (assist for LLE and support to pull up using R rail and HOB features)   Sit to Supine Standby Assist  (flat bed, R rail)   Scooting Maximal Assist  (to mod A)   Interdisciplinary Plan of Care Collaboration   IDT Collaboration with  Occupational Therapist   Patient Position at End of Therapy In Bed;Tray Table within Reach;Call Light within Reach;Phone within Reach   Collaboration Comments CLOF     Scooting activity with focus on sequencing and anterior lean x 5 reps with mod-max A.     Assessment    Pt with good tolerance for session with focus on bed mobility to assess carry over from morning session.  Pt with improvement in sit to supine using rail and egg roll method.  Pt with improving ability to bridge hips to move in bed. Pt with poor anterior lean with scooting activities this session.   Strengths: Able to follow instructions, Motivated for self care and independence, Pleasant and cooperative, Supportive family, Willingly participates in therapeutic activities  Barriers: Bladder incontinence, Decreased endurance, Hemiparesis, Impaired activity tolerance, Impaired balance, Limited mobility    Plan    **unable to leave room due to positive cdiff** Bed mobility training, transfer training (squat pivot currently), standing tolerance/balance, gait training (currently using R wall rail), ROM/stretching, neuro re-ed for L  hemibody, encourage anterior weight shifting/leaning, serial casting to LUE applied on 6/8/22 and removed on 6/14/22. Plan to recast on Friday 6/17/22- please discourage patient from scratching skin.     Passport items to be completed:  Get in/out of bed safely, in/out of a vehicle, safely use mobility device, walk or wheel around home/community, navigate up and down stairs, show how to get up/down from the ground, ensure home is accessible, demonstrate HEP, complete caregiver training    Physical Therapy Problems (Active)       Problem: Mobility       Dates: Start: 06/01/22         Goal: STG-Within one week, patient will propel wheelchair household distances 50 ft with Min A.       Dates: Start: 06/01/22               Problem: Mobility Transfers       Dates: Start: 06/01/22         Goal: STG-Within one week, patient will perform bed mobility with Mod A and bed functions as needed.       Dates: Start: 06/01/22            Goal: STG-Within one week, patient will transfer bed to chair with Max Ax1 via squat/stand pivot.       Dates: Start: 06/01/22               Problem: PT-Long Term Goals       Dates: Start: 06/01/22         Goal: LTG-By discharge, patient will propel wheelchair 50 ft mod I.       Dates: Start: 06/01/22            Goal: LTG-By discharge, patient will ambulate 50 ft with LRAD and Min A.       Dates: Start: 06/01/22            Goal: LTG-By discharge, patient will transfer one surface to another with CGA and LRAD.       Dates: Start: 06/01/22            Goal: LTG-By discharge, patient will transfer in/out of a car with CGA and LRAD.       Dates: Start: 06/01/22            Goal: LTG-By discharge, patient will perform bed mobility independently.       Dates: Start: 06/01/22

## 2022-06-15 NOTE — PROGRESS NOTES
Rehab Progress Note     Encounter Date: 6/15/2022    CC: Decreased mobility, confusion    Interval Events (Subjective)  Patient sitting up in room. Wife reports no return of confusion. He had formed BMs for past 48 hours, can clear to go to the gym as long as not loose/incontinent.  Discussed with wife.  Blood sugars doing well. He reports itching skin. Discussed benadryl cream as needed now that casting has been removed. Per PT will recast on Friday. Denies NVD.     IDT Team Meeting 6/9/2022  DC/Disposition:  6/21/22    Objective:  VITAL SIGNS: /53   Pulse 70   Temp 36.8 °C (98.2 °F) (Oral)   Resp 18   Ht 1.829 m (6')   Wt 85.5 kg (188 lb 7.9 oz)   SpO2 95%   BMI 25.56 kg/m²   Gen: NAD  Psych: Mood and affect appropriate  CV: RRR, no edema  Resp: CTAB, no upper airway sounds  Abd: NTND  Neuro: AOx4, following commands, 3/4 left elbow MAS but increased ROM  Dead skin under area where casting was; no erythema    Recent Results (from the past 72 hour(s))   POCT glucose device results    Collection Time: 06/12/22  5:23 PM   Result Value Ref Range    POC Glucose, Blood 169 (H) 65 - 99 mg/dL   POCT glucose device results    Collection Time: 06/12/22  9:16 PM   Result Value Ref Range    POC Glucose, Blood 151 (H) 65 - 99 mg/dL   POCT glucose device results    Collection Time: 06/13/22  6:24 AM   Result Value Ref Range    POC Glucose, Blood 149 (H) 65 - 99 mg/dL   POCT glucose device results    Collection Time: 06/13/22  7:35 AM   Result Value Ref Range    POC Glucose, Blood 133 (H) 65 - 99 mg/dL   POCT glucose device results    Collection Time: 06/13/22 11:34 AM   Result Value Ref Range    POC Glucose, Blood 172 (H) 65 - 99 mg/dL   POCT glucose device results    Collection Time: 06/13/22  5:28 PM   Result Value Ref Range    POC Glucose, Blood 160 (H) 65 - 99 mg/dL   POCT glucose device results    Collection Time: 06/13/22  8:30 PM   Result Value Ref Range    POC Glucose, Blood 184 (H) 65 - 99 mg/dL   POCT  glucose device results    Collection Time: 06/14/22  7:17 AM   Result Value Ref Range    POC Glucose, Blood 132 (H) 65 - 99 mg/dL   POCT glucose device results    Collection Time: 06/14/22 11:37 AM   Result Value Ref Range    POC Glucose, Blood 201 (H) 65 - 99 mg/dL   POCT glucose device results    Collection Time: 06/14/22  5:09 PM   Result Value Ref Range    POC Glucose, Blood 173 (H) 65 - 99 mg/dL   POCT glucose device results    Collection Time: 06/14/22  9:27 PM   Result Value Ref Range    POC Glucose, Blood 177 (H) 65 - 99 mg/dL   POCT glucose device results    Collection Time: 06/15/22  7:36 AM   Result Value Ref Range    POC Glucose, Blood 127 (H) 65 - 99 mg/dL   POCT glucose device results    Collection Time: 06/15/22 11:29 AM   Result Value Ref Range    POC Glucose, Blood 265 (H) 65 - 99 mg/dL       Current Facility-Administered Medications   Medication Frequency   • traMADol (ULTRAM) 50 MG tablet 50 mg Q6HRS PRN   • vancomycin 50 mg/mL oral soln 125 mg Q6HRS   • Pharmacy Consult Request PHARMACY TO DOSE   • miconazole 2%-zinc oxide (Vinny) topical cream Q6HRS PRN   • insulin regular (HumuLIN R,NovoLIN R) injection 4X/DAY ACHS    And   • dextrose 50% (D50W) injection 25 g Q15 MIN PRN   • amLODIPine (NORVASC) tablet 10 mg Q DAY   • lidocaine (LIDODERM) 5 % 1 Patch Q24HR   • vitamin D3 (cholecalciferol) tablet 1,000 Units DAILY   • Respiratory Therapy Consult Continuous RT   • hydrALAZINE (APRESOLINE) tablet 25 mg Q8HRS PRN   • acetaminophen (Tylenol) tablet 650 mg Q4HRS PRN   • polyethylene glycol/lytes (MIRALAX) PACKET 1 Packet QDAY PRN    And   • magnesium hydroxide (MILK OF MAGNESIA) suspension 30 mL QDAY PRN    And   • bisacodyl (DULCOLAX) suppository 10 mg QDAY PRN   • omeprazole (PRILOSEC) capsule 20 mg DAILY   • artificial tears ophthalmic solution 1 Drop PRN   • benzocaine-menthol (Cepacol) lozenge 1 Lozenge Q2HRS PRN   • mag hydrox-al hydrox-simeth (MAALOX PLUS ES or MYLANTA DS) suspension 20 mL  Q2HRS PRN   • ondansetron (ZOFRAN ODT) dispertab 4 mg 4X/DAY PRN    Or   • ondansetron (ZOFRAN) syringe/vial injection 4 mg 4X/DAY PRN   • traZODone (DESYREL) tablet 50 mg QHS PRN   • sodium chloride (OCEAN) 0.65 % nasal spray 2 Spray PRN   • midazolam (VERSED) 5 mg/mL (1 mL vial) PRN   • acetaminophen (Tylenol) tablet 650 mg Q6HRS PRN   • allopurinol (ZYLOPRIM) tablet 100 mg QDAY   • atorvastatin (LIPITOR) tablet 80 mg Q EVENING   • clopidogrel (PLAVIX) tablet 75 mg QDAY   • FLUoxetine (PROZAC) capsule 40 mg DAILY   • gabapentin (NEURONTIN) capsule 100 mg QHS PRN   • losartan (COZAAR) tablet 50 mg BID   • metoprolol SR (TOPROL XL) tablet 100 mg Q EVENING   • tamsulosin (FLOMAX) capsule 0.4 mg DAILY       Orders Placed This Encounter   Procedures   • Diet Order Diet: Consistent CHO (Diabetic); Second Modifier: (optional): Cardiac     Standing Status:   Standing     Number of Occurrences:   1     Order Specific Question:   Diet:     Answer:   Consistent CHO (Diabetic) [4]     Order Specific Question:   Second Modifier: (optional)     Answer:   Cardiac [6]       Assessment:  Active Hospital Problems    Diagnosis    • *Acute encephalopathy    • Hyponatremia    • Dehydration    • Essential hypertension, benign    • Benign prostatic hyperplasia with urinary obstruction    • Diarrhea    • GERD with esophagitis    • Recurrent UTI    • Hypomagnesemia    • Type 2 diabetes mellitus, with long-term current use of insulin (HCC)    • Stage 3b chronic kidney disease (HCC)        Medical Decision Making and Plan:  Acute toxic encephalopathy - Patient with UTI with urosepsis s/p IV antibiotics. Patient has urinary retention and requires occasional catheterization putting him at risk for recurrent UTIs. 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  On Plavix and statin  -Will start low dose Baclofen although most likely contracture. Serial casting to start 6/8, therapy holds otherwise for GI and leukocytosis. Most likely removal on 6/14. Will discontinue Baclofen as having some more confusion. Removal on 6/14 with improved ROM. Next casting on 6/17    HTN/CAD - Patient on Plavix. Patient on Losartan 50 mg BID, Metoprolol 100 mg XL  -Consult Hospitalist. Recommending TTE     HLD - Patient on Atorvastatin 80 mg QHS     Leukocytosis - On treatment for UTI. On Ancef with recommendation to switch to Augment. Will check CBC in AM before switch  -Repeat 16.5 up from 16.1, consult Hospitalist. Improved to 12.7 on 6/5/22. Repeat 6/8 with Leukocytosis 17.9 and diffuse loose stools.   -Check C diff. Check UA. Check Blood cultures. KUB with distention and featureless colon. Discussed with hospitalist and start antibiotics including Flagyl. CT abdomen - possible small left cyst bleeding. Will monitor CBC. On Ceftriaxone. Improving leukocytosis 11.7 on 6/10/22    C Diff positive on 6/8. On Vancomycin PO. Improving. Still having loose BMs on 6/13/22. Per hospitalist continue Vancomycin for 10 days  -No loose BMs in 48 hours. Will discontinue contact precautions    Anemia - Check AM CBC - 12.2 Repeat 11.3, Fe low but with GI issues holding on starting 6/8     DM2 with hyperglycemia - Patient on Glargine 11 U and SSI  -Consult hospitalist     Depression - Patient on Fluoxetine 40 mg daily      Hx of Gout - Patient on Allopurinol 100 mg daily     Urinary Retention - Patient requiring catheterization ~1 time daily. Follows with Urology. Continue Flomax  -Hematuria on 6/7 with significant amount. Hold Xarelto. Flush bladder.  Improved hematuria     Vitamin D Deficiency - 29 on admission. Start 1000 U     DVT Ppx - Patient on Xarelto ppx dose. On hold for bleeding.     Total time:  36 minutes.  I spent greater than 50% of the time for patient care, counseling, and coordination on  this date, including unit/floor time, and face-to-face time with the patient as per interval events and assessment and plan above. Topics discussed included improved cognition, improved ROM, stable spasticity, improved BMs, and discontinue isolation. Discussed at length with wife and patient if loose BMs returns let us know as risk for spread.     Katheryn Pratt M.D.

## 2022-06-15 NOTE — CARE PLAN
Problem: Bathing  Goal: STG-Within one week, patient will bathe w/ max A using DME as needed.   Outcome: Not Met  Note: Pt cont to require Total x2 for shower     Problem: Dressing  Goal: STG-Within one week, patient will dress LB w/ mod A.   Outcome: Not Met  Note: Pt cont to require Total x2 for LB dressing     Problem: Toileting  Goal: STG-Within one week, patient will complete toileting tasks w/ max A using DME as needed.   Outcome: Not Met  Note: Pt cont to require Total x2 for toileting due to incontinence      Problem: Functional Transfers  Goal: STG-Within one week, patient will transfer to toilet w/ max A using DME as needed.  Outcome: Not Met  Note: Pt cont to require Total x2 for toilet transfer  Goal: STG-Within one week, patient will transfer to step in shower with max A using DME as needed.  Outcome: Not Met  Note: Pt cont to require Total x2 for shower transfer

## 2022-06-15 NOTE — CARE PLAN
Problem: Knowledge Deficit - Standard  Goal: Patient and family/care givers will demonstrate understanding of plan of care, disease process/condition, diagnostic tests and medications  Outcome: Progressing     Problem: Skin Integrity  Goal: Skin integrity is maintained or improved  Outcome: Progressing   The patient is Watcher - Medium risk of patient condition declining or worsening    Shift Goals  Clinical Goals: safety and comfort  Patient Goals: pain control

## 2022-06-15 NOTE — CARE PLAN
The patient is Watcher - Medium risk of patient condition declining or worsening    Shift Goals  Clinical Goals: Safety and comfort  Patient Goals: Comfort    Progress made toward(s) clinical / shift goals:    Problem: Pain - Standard  Goal: Alleviation of pain or a reduction in pain to the patient’s comfort goal  Outcome: Progressing  Note: Patient able to verbalize needs.  Denies pain or discomfort this shift and no s/s same noted.  Will continue to monitor.        Patient is not progressing towards the following goals:    Problem: Diabetes Management  Goal: Patient's ability to maintain appropriate glucose levels will be maintained or improve  Note:  mg/dl, received 2 units Regular Insulin. HS snack given. No diabetic reactions noted.

## 2022-06-16 LAB
ANION GAP SERPL CALC-SCNC: 10 MMOL/L (ref 7–16)
BUN SERPL-MCNC: 23 MG/DL (ref 8–22)
CALCIUM SERPL-MCNC: 8.1 MG/DL (ref 8.5–10.5)
CHLORIDE SERPL-SCNC: 105 MMOL/L (ref 96–112)
CO2 SERPL-SCNC: 22 MMOL/L (ref 20–33)
CREAT SERPL-MCNC: 0.79 MG/DL (ref 0.5–1.4)
GFR SERPLBLD CREATININE-BSD FMLA CKD-EPI: 93 ML/MIN/1.73 M 2
GLUCOSE BLD STRIP.AUTO-MCNC: 127 MG/DL (ref 65–99)
GLUCOSE BLD STRIP.AUTO-MCNC: 178 MG/DL (ref 65–99)
GLUCOSE BLD STRIP.AUTO-MCNC: 203 MG/DL (ref 65–99)
GLUCOSE BLD STRIP.AUTO-MCNC: 210 MG/DL (ref 65–99)
GLUCOSE SERPL-MCNC: 133 MG/DL (ref 65–99)
MAGNESIUM SERPL-MCNC: 1.1 MG/DL (ref 1.5–2.5)
PHOSPHATE SERPL-MCNC: 3.4 MG/DL (ref 2.5–4.5)
POTASSIUM SERPL-SCNC: 3.7 MMOL/L (ref 3.6–5.5)
SODIUM SERPL-SCNC: 137 MMOL/L (ref 135–145)

## 2022-06-16 PROCEDURE — 82962 GLUCOSE BLOOD TEST: CPT

## 2022-06-16 PROCEDURE — 99232 SBSQ HOSP IP/OBS MODERATE 35: CPT | Performed by: HOSPITALIST

## 2022-06-16 PROCEDURE — 700111 HCHG RX REV CODE 636 W/ 250 OVERRIDE (IP): Performed by: HOSPITALIST

## 2022-06-16 PROCEDURE — A9270 NON-COVERED ITEM OR SERVICE: HCPCS | Performed by: PHYSICAL MEDICINE & REHABILITATION

## 2022-06-16 PROCEDURE — 97530 THERAPEUTIC ACTIVITIES: CPT

## 2022-06-16 PROCEDURE — A9270 NON-COVERED ITEM OR SERVICE: HCPCS | Performed by: HOSPITALIST

## 2022-06-16 PROCEDURE — 84100 ASSAY OF PHOSPHORUS: CPT

## 2022-06-16 PROCEDURE — 99233 SBSQ HOSP IP/OBS HIGH 50: CPT | Performed by: PHYSICAL MEDICINE & REHABILITATION

## 2022-06-16 PROCEDURE — 700102 HCHG RX REV CODE 250 W/ 637 OVERRIDE(OP): Performed by: PHYSICAL MEDICINE & REHABILITATION

## 2022-06-16 PROCEDURE — 97535 SELF CARE MNGMENT TRAINING: CPT

## 2022-06-16 PROCEDURE — 36415 COLL VENOUS BLD VENIPUNCTURE: CPT

## 2022-06-16 PROCEDURE — 80048 BASIC METABOLIC PNL TOTAL CA: CPT

## 2022-06-16 PROCEDURE — 700102 HCHG RX REV CODE 250 W/ 637 OVERRIDE(OP): Performed by: HOSPITALIST

## 2022-06-16 PROCEDURE — 700101 HCHG RX REV CODE 250: Performed by: PHYSICAL MEDICINE & REHABILITATION

## 2022-06-16 PROCEDURE — 83735 ASSAY OF MAGNESIUM: CPT

## 2022-06-16 PROCEDURE — 770010 HCHG ROOM/CARE - REHAB SEMI PRIVAT*

## 2022-06-16 PROCEDURE — 97129 THER IVNTJ 1ST 15 MIN: CPT

## 2022-06-16 PROCEDURE — 97112 NEUROMUSCULAR REEDUCATION: CPT

## 2022-06-16 PROCEDURE — 97130 THER IVNTJ EA ADDL 15 MIN: CPT

## 2022-06-16 RX ORDER — LANOLIN ALCOHOL/MO/W.PET/CERES
400 CREAM (GRAM) TOPICAL 2 TIMES DAILY
Status: DISCONTINUED | OUTPATIENT
Start: 2022-06-16 | End: 2022-06-21 | Stop reason: HOSPADM

## 2022-06-16 RX ORDER — ASCORBIC ACID 500 MG
500 TABLET ORAL DAILY
Status: DISCONTINUED | OUTPATIENT
Start: 2022-06-17 | End: 2022-06-21 | Stop reason: HOSPADM

## 2022-06-16 RX ORDER — FERROUS SULFATE 325(65) MG
325 TABLET ORAL
Status: DISCONTINUED | OUTPATIENT
Start: 2022-06-17 | End: 2022-06-21 | Stop reason: HOSPADM

## 2022-06-16 RX ORDER — MAGNESIUM SULFATE HEPTAHYDRATE 40 MG/ML
2 INJECTION, SOLUTION INTRAVENOUS ONCE
Status: COMPLETED | OUTPATIENT
Start: 2022-06-16 | End: 2022-06-16

## 2022-06-16 RX ORDER — DIPHENHYDRAMINE HYDROCHLORIDE, ZINC ACETATE 2; .1 G/100G; G/100G
CREAM TOPICAL 3 TIMES DAILY PRN
Status: DISCONTINUED | OUTPATIENT
Start: 2022-06-16 | End: 2022-06-21 | Stop reason: HOSPADM

## 2022-06-16 RX ADMIN — CLOPIDOGREL 75 MG: 75 TABLET, FILM COATED ORAL at 05:56

## 2022-06-16 RX ADMIN — MAGNESIUM OXIDE TAB 400 MG (241.3 MG ELEMENTAL MG) 400 MG: 400 (241.3 MG) TAB at 11:34

## 2022-06-16 RX ADMIN — Medication 1000 UNITS: at 10:02

## 2022-06-16 RX ADMIN — LOSARTAN POTASSIUM 50 MG: 25 TABLET, FILM COATED ORAL at 21:19

## 2022-06-16 RX ADMIN — OMEPRAZOLE 20 MG: 20 CAPSULE, DELAYED RELEASE ORAL at 10:02

## 2022-06-16 RX ADMIN — TAMSULOSIN HYDROCHLORIDE 0.4 MG: 0.4 CAPSULE ORAL at 10:02

## 2022-06-16 RX ADMIN — AMLODIPINE BESYLATE 10 MG: 5 TABLET ORAL at 05:56

## 2022-06-16 RX ADMIN — LOSARTAN POTASSIUM 50 MG: 25 TABLET, FILM COATED ORAL at 10:02

## 2022-06-16 RX ADMIN — ALLOPURINOL 100 MG: 100 TABLET ORAL at 05:56

## 2022-06-16 RX ADMIN — METOPROLOL SUCCINATE 100 MG: 100 TABLET, FILM COATED, EXTENDED RELEASE ORAL at 21:20

## 2022-06-16 RX ADMIN — MAGNESIUM SULFATE HEPTAHYDRATE 2 G: 2 INJECTION, SOLUTION INTRAVENOUS at 15:13

## 2022-06-16 RX ADMIN — VANCOMYCIN HYDROCHLORIDE 125 MG: KIT ORAL at 17:17

## 2022-06-16 RX ADMIN — FLUOXETINE 40 MG: 20 CAPSULE ORAL at 10:02

## 2022-06-16 RX ADMIN — VANCOMYCIN HYDROCHLORIDE 125 MG: KIT ORAL at 05:57

## 2022-06-16 RX ADMIN — MAGNESIUM OXIDE TAB 400 MG (241.3 MG ELEMENTAL MG) 400 MG: 400 (241.3 MG) TAB at 21:19

## 2022-06-16 RX ADMIN — VANCOMYCIN HYDROCHLORIDE 125 MG: KIT ORAL at 11:34

## 2022-06-16 RX ADMIN — ATORVASTATIN CALCIUM 80 MG: 40 TABLET, FILM COATED ORAL at 21:20

## 2022-06-16 RX ADMIN — TRAZODONE HYDROCHLORIDE 50 MG: 50 TABLET ORAL at 21:20

## 2022-06-16 ASSESSMENT — ENCOUNTER SYMPTOMS
COUGH: 0
BLURRED VISION: 0
NERVOUS/ANXIOUS: 0
DIARRHEA: 0
DIZZINESS: 0
FEVER: 0

## 2022-06-16 ASSESSMENT — ACTIVITIES OF DAILY LIVING (ADL)
TOILETING_LEVEL_OF_ASSIST_DESCRIPTION: ASSIST FOR HYGIENE;ASSIST TO PULL PANTS UP;ASSIST TO PULL PANTS DOWN;INCREASED TIME;INITIAL PREPARATION FOR TASK;SET-UP OF EQUIPMENT;SUPERVISION FOR SAFETY;VERBAL CUEING

## 2022-06-16 NOTE — PROGRESS NOTES
Rehab Progress Note     Encounter Date: 6/16/2022    CC: Decreased mobility, confusion    Interval Events (Subjective)  Patient sitting up in room. He reports therapy is OK. He is more confused today. He reports he cannot say what he did with PT.  He had a lot of incontinence of stool overnight. Magnesium very low, started on supplement. With less loose stools will start on Fe supplement as Fe previously low.     IDT Team Meeting 6/16/2022    IKatheryn M.D., was present and led the interdisciplinary team conference on 6/16/2022.  I led the IDT conference and agree with the IDT conference documentation and plan of care as noted below.     RN:  Diet regular   % Meal     Pain    Sleep    Bowel inContinent   Bladder Michaels   In's & Out's    Sacral wound  C diff isolation     PT:  Bed Mobility Lesia   Transfers Mod-maxA   Mobility Have not walked recently due to contact   Stairs    Confused today with wife gone; not motivated at times  Cast his arm tomorrow    OT:  Eating    Grooming    Bathing    UB Dressing modA   LB Dressing modA   Toileting    Shower & Transfer    More confused today; tearful    SLP:  Not met goals  O log is very variable  Poor carry over  Wife manages most things at home  Reduce to 30     CM:  Continues to work on disposition and DME needs.      DC/Disposition:  6/21/22    Objective:  VITAL SIGNS: /72   Pulse 60   Temp 36.6 °C (97.8 °F) (Oral)   Resp 18   Ht 1.829 m (6')   Wt 85.5 kg (188 lb 7.9 oz)   SpO2 94%   BMI 25.56 kg/m²   Gen: NAD  Psych: Mood and affect appropriate  CV: RRR, no edema  Resp: CTAB, no upper airway sounds  Abd: NTND  Neuro: AOx4, following commands, 3/4 left elbow MAS but increased ROM  Dead skin under area where casting was; no erythema  Unchanged from 6/15/22    Recent Results (from the past 72 hour(s))   POCT glucose device results    Collection Time: 06/13/22  5:28 PM   Result Value Ref Range    POC Glucose, Blood 160 (H) 65 - 99 mg/dL   POCT  glucose device results    Collection Time: 06/13/22  8:30 PM   Result Value Ref Range    POC Glucose, Blood 184 (H) 65 - 99 mg/dL   POCT glucose device results    Collection Time: 06/14/22  7:17 AM   Result Value Ref Range    POC Glucose, Blood 132 (H) 65 - 99 mg/dL   POCT glucose device results    Collection Time: 06/14/22 11:37 AM   Result Value Ref Range    POC Glucose, Blood 201 (H) 65 - 99 mg/dL   POCT glucose device results    Collection Time: 06/14/22  5:09 PM   Result Value Ref Range    POC Glucose, Blood 173 (H) 65 - 99 mg/dL   POCT glucose device results    Collection Time: 06/14/22  9:27 PM   Result Value Ref Range    POC Glucose, Blood 177 (H) 65 - 99 mg/dL   POCT glucose device results    Collection Time: 06/15/22  7:36 AM   Result Value Ref Range    POC Glucose, Blood 127 (H) 65 - 99 mg/dL   POCT glucose device results    Collection Time: 06/15/22 11:29 AM   Result Value Ref Range    POC Glucose, Blood 265 (H) 65 - 99 mg/dL   POCT glucose device results    Collection Time: 06/15/22  5:10 PM   Result Value Ref Range    POC Glucose, Blood 132 (H) 65 - 99 mg/dL   POCT glucose device results    Collection Time: 06/15/22  8:14 PM   Result Value Ref Range    POC Glucose, Blood 177 (H) 65 - 99 mg/dL   Basic Metabolic Panel    Collection Time: 06/16/22  6:00 AM   Result Value Ref Range    Sodium 137 135 - 145 mmol/L    Potassium 3.7 3.6 - 5.5 mmol/L    Chloride 105 96 - 112 mmol/L    Co2 22 20 - 33 mmol/L    Glucose 133 (H) 65 - 99 mg/dL    Bun 23 (H) 8 - 22 mg/dL    Creatinine 0.79 0.50 - 1.40 mg/dL    Calcium 8.1 (L) 8.5 - 10.5 mg/dL    Anion Gap 10.0 7.0 - 16.0   MAGNESIUM    Collection Time: 06/16/22  6:00 AM   Result Value Ref Range    Magnesium 1.1 (L) 1.5 - 2.5 mg/dL   PHOSPHORUS    Collection Time: 06/16/22  6:00 AM   Result Value Ref Range    Phosphorus 3.4 2.5 - 4.5 mg/dL   ESTIMATED GFR    Collection Time: 06/16/22  6:00 AM   Result Value Ref Range    GFR (CKD-EPI) 93 >60 mL/min/1.73 m 2   POCT  glucose device results    Collection Time: 06/16/22  7:39 AM   Result Value Ref Range    POC Glucose, Blood 127 (H) 65 - 99 mg/dL   POCT glucose device results    Collection Time: 06/16/22 11:30 AM   Result Value Ref Range    POC Glucose, Blood 203 (H) 65 - 99 mg/dL       Current Facility-Administered Medications   Medication Frequency   • magnesium oxide tablet 400 mg BID   • traMADol (ULTRAM) 50 MG tablet 50 mg Q6HRS PRN   • vancomycin 50 mg/mL oral soln 125 mg Q6HRS   • Pharmacy Consult Request PHARMACY TO DOSE   • miconazole 2%-zinc oxide (Vinny) topical cream Q6HRS PRN   • insulin regular (HumuLIN R,NovoLIN R) injection 4X/DAY ACHS    And   • dextrose 50% (D50W) injection 25 g Q15 MIN PRN   • amLODIPine (NORVASC) tablet 10 mg Q DAY   • lidocaine (LIDODERM) 5 % 1 Patch Q24HR   • vitamin D3 (cholecalciferol) tablet 1,000 Units DAILY   • Respiratory Therapy Consult Continuous RT   • hydrALAZINE (APRESOLINE) tablet 25 mg Q8HRS PRN   • acetaminophen (Tylenol) tablet 650 mg Q4HRS PRN   • polyethylene glycol/lytes (MIRALAX) PACKET 1 Packet QDAY PRN    And   • magnesium hydroxide (MILK OF MAGNESIA) suspension 30 mL QDAY PRN    And   • bisacodyl (DULCOLAX) suppository 10 mg QDAY PRN   • omeprazole (PRILOSEC) capsule 20 mg DAILY   • artificial tears ophthalmic solution 1 Drop PRN   • benzocaine-menthol (Cepacol) lozenge 1 Lozenge Q2HRS PRN   • mag hydrox-al hydrox-simeth (MAALOX PLUS ES or MYLANTA DS) suspension 20 mL Q2HRS PRN   • ondansetron (ZOFRAN ODT) dispertab 4 mg 4X/DAY PRN    Or   • ondansetron (ZOFRAN) syringe/vial injection 4 mg 4X/DAY PRN   • traZODone (DESYREL) tablet 50 mg QHS PRN   • sodium chloride (OCEAN) 0.65 % nasal spray 2 Spray PRN   • midazolam (VERSED) 5 mg/mL (1 mL vial) PRN   • acetaminophen (Tylenol) tablet 650 mg Q6HRS PRN   • allopurinol (ZYLOPRIM) tablet 100 mg QDAY   • atorvastatin (LIPITOR) tablet 80 mg Q EVENING   • clopidogrel (PLAVIX) tablet 75 mg QDAY   • FLUoxetine (PROZAC) capsule 40  mg DAILY   • gabapentin (NEURONTIN) capsule 100 mg QHS PRN   • losartan (COZAAR) tablet 50 mg BID   • metoprolol SR (TOPROL XL) tablet 100 mg Q EVENING   • tamsulosin (FLOMAX) capsule 0.4 mg DAILY       Orders Placed This Encounter   Procedures   • Diet Order Diet: Consistent CHO (Diabetic); Second Modifier: (optional): Cardiac     Standing Status:   Standing     Number of Occurrences:   1     Order Specific Question:   Diet:     Answer:   Consistent CHO (Diabetic) [4]     Order Specific Question:   Second Modifier: (optional)     Answer:   Cardiac [6]       Assessment:  Active Hospital Problems    Diagnosis    • *Acute encephalopathy    • Hyponatremia    • Dehydration    • Essential hypertension, benign    • Benign prostatic hyperplasia with urinary obstruction    • Diarrhea    • GERD with esophagitis    • Recurrent UTI    • Hypomagnesemia    • Type 2 diabetes mellitus, with long-term current use of insulin (HCC)    • Stage 3b chronic kidney disease (HCC)        Medical Decision Making and Plan:  Acute toxic encephalopathy - Patient with UTI with urosepsis s/p IV antibiotics. Patient has urinary retention and requires occasional catheterization putting him at risk for recurrent UTIs. 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 On Plavix and statin  -Will start low dose Baclofen although most likely contracture. Serial casting to start 6/8, therapy holds otherwise for GI and leukocytosis. Most likely removal on 6/14. Will discontinue Baclofen as having some more confusion. Removal on 6/14 with improved ROM. Next casting on 6/17    HTN/CAD - Patient on Plavix. Patient on Losartan 50 mg BID, Metoprolol 100 mg XL  -Consult Hospitalist. Recommending TTE     HLD - Patient on Atorvastatin 80 mg QHS     Leukocytosis - On treatment for  UTI. On Ancef with recommendation to switch to Augment. Will check CBC in AM before switch  -Repeat 16.5 up from 16.1, consult Hospitalist. Improved to 12.7 on 6/5/22. Repeat 6/8 with Leukocytosis 17.9 and diffuse loose stools.   -Check C diff. Check UA. Check Blood cultures. KUB with distention and featureless colon. Discussed with hospitalist and start antibiotics including Flagyl. CT abdomen - possible small left cyst bleeding. Will monitor CBC. On Ceftriaxone. Improving leukocytosis 11.7 on 6/10/22    C Diff positive on 6/8. On Vancomycin PO. Improving. Still having loose BMs on 6/13/22. Per hospitalist continue Vancomycin for 10 days  -No loose BMs in 48 hours. Having incontinence, will pause on discontinue contact precautions    Anemia - Check AM CBC - 12.2 Repeat 11.3, Fe low but with GI issues holding on starting 6/8. Will start now that GI under more control     DM2 with hyperglycemia - Patient on Glargine 11 U and SSI  -Consult hospitalist     Depression - Patient on Fluoxetine 40 mg daily      Hx of Gout - Patient on Allopurinol 100 mg daily     Urinary Retention - Patient requiring catheterization ~1 time daily. Follows with Urology. Continue Flomax  -Hematuria on 6/7 with significant amount. Hold Xarelto. Flush bladder.  Improved hematuria     Vitamin D Deficiency - 29 on admission. Start 1000 U     DVT Ppx - Patient on Xarelto ppx dose. On hold for bleeding.     Total time:  36 minutes.  I spent greater than 50% of the time for patient care, counseling, and coordination on this date, including unit/floor time, and face-to-face time with the patient as per interval events and assessment and plan above. Topics discussed included discharge planning, more confusion will check labs, Fe deficiency, Mg deficiency, and start supplements. Patient was discussed separately in IDT today; please see details above.    Katheryn Pratt M.D.

## 2022-06-16 NOTE — THERAPY
Speech Language Pathology  Daily Treatment     Patient Name: Av Bob  Age:  77 y.o., Sex:  male  Medical Record #: [id number]  Today's Date: 6/16/2022     Precautions  Precautions: Fall Risk  Comments: L eric from previous CVA poor OOB tolerance, wounds on buttocks, positive cdiff    Subjective    Patient was willing to participate. Spouse was present during session.      Objective       06/16/22 1432   Treatment Charges   SLP Cognitive Skill Development First 15 Minutes 1   SLP Cognitive Skill Development Additional 15 Minutes 1   SLP Total Time Spent   SLP Individual Total Time Spent (Mins) 30   Cognition   Orientation  Minimal (4)   Written Sequencing Moderate (3)  (picture seq)       Assessment    Patient completed o-log with a final score of 20/30.   Completed 4 step picture sequencing with 50% accuracy. Described picture sequences with min cues.     Strengths: Supportive family  Barriers: Impaired functional cognition, Impaired activity tolerance, Impaired carryover of learning    Plan    Orientation, basic problem solving and memory.       Speech Therapy Problems (Active)       Problem: Memory STGs       Dates: Start: 06/01/22         Goal: STG-Within one week, patient will remember safety precautions related to hospitalization with 75% accuracy.        Dates: Start: 06/01/22         Goal Note filed on 06/08/22 1452 by Harpal Williamson MS,CCC-SLP       Mod-max A for pt to recall new information related to his current hospitalization                  Problem: Problem Solving STGs       Dates: Start: 06/01/22         Goal: STG-Within one week, patient will complete o-log with a final score of 25/30 over three consecutive days       Dates: Start: 06/01/22         Goal Note filed on 06/08/22 1452 by Harpal Williamson MS,CCC-SLP       Pt's highest O-log score to date was 23/30                  Problem: Speech/Swallowing LTGs       Dates: Start: 06/01/22         Goal: LTG-By discharge, patient will solve  basic problems in order to d/c home with SPV       Dates: Start: 06/01/22

## 2022-06-16 NOTE — THERAPY
Physical Therapy   Daily Treatment     Patient Name: Av Bob  Age:  77 y.o., Sex:  male  Medical Record #: [id number]  Today's Date: 6/16/2022     Precautions  Precautions: (P) Fall Risk  Comments: (P) L eric from previous CVA poor OOB tolerance, wounds on buttocks, positive cdiff    Subjective    Patient in bed, crying and disoriented, agreeable to therapy with encouragement and distraction.     Objective       06/16/22 1031   PT Charge Group   PT Neuromuscular Re-Education / Balance 1   PT Therapeutic Activities 3   PT Total Time Spent   PT Individual Total Time Spent (Mins) 60   Precautions   Precautions Fall Risk   Comments L eric from previous CVA poor OOB tolerance, wounds on buttocks, positive cdiff   Pain 0 - 10 Group   Location Buttock   Location Orientation Posterior   Wheelchair Functional Level of Assist   Wheelchair Assist Moderate Assist   Distance Wheelchair (Feet or Distance) 10   Wheelchair Description Assistance with steering;Extra time;Leg rest management;Impaired coordination;Limited by fatigue;Requires incidental assist;Safety concerns;Supervision for safety;Verbal cueing   Transfer Functional Level of Assist   Bed, Chair, Wheelchair Transfer Maximal Assist   Bed Chair Wheelchair Transfer Description Increased time;Requires lift;Set-up of equipment;Squat pivot transfer to wheelchair;Verbal cueing   Bed Mobility    Supine to Sit Minimal Assist  (with HOB elevated and PT assist as transfer pole)   Sit to Supine Minimal Assist  (for LLE with bed flat and R rail)   Sit to Stand Moderate Assist  (with RUE support)   Scooting Maximal Assist  (to Mod A)   Rolling Moderate Assist to Rt.;Moderate Assist to Lt.   Interdisciplinary Plan of Care Collaboration   IDT Collaboration with  Family / Caregiver;Occupational Therapist;Physician;Therapy Tech   Patient Position at End of Therapy In Bed;Call Light within Reach;Tray Table within Reach;Phone within Reach   Collaboration Comments CARMEN      Re-orientation and behavioral management at beginning of session due to confusion.    Sit<>stands with RUE support on bed rail with Mod-Min A 3x10-30 seconds with cues for anterior weight shift and upright posture.    W/C propulsion in room with assist for steering, attempts to use RUE to propel.    Assessment    Patient tolerated session fairly, requiring extra time for redirection however eventually able to participate in mobility interventions. Improved performance and motor planning today.    Strengths: Able to follow instructions, Motivated for self care and independence, Pleasant and cooperative, Supportive family, Willingly participates in therapeutic activities  Barriers: Bladder incontinence, Decreased endurance, Hemiparesis, Impaired activity tolerance, Impaired balance, Limited mobility    Plan    **unable to leave room due to positive cdiff** Bed mobility training, transfer training (squat pivot currently), standing tolerance/balance, gait training (currently using R wall rail), ROM/stretching, neuro re-ed for L hemibody, encourage anterior weight shifting/leaning, serial casting to LUE applied on 6/8/22 and removed on 6/14/22. Plan to recast on Friday 6/17/22- please discourage patient from scratching skin.     Passport items to be completed:  Get in/out of bed safely, in/out of a vehicle, safely use mobility device, walk or wheel around home/community, navigate up and down stairs, show how to get up/down from the ground, ensure home is accessible, demonstrate HEP, complete caregiver training      Physical Therapy Problems (Active)       Problem: Mobility Transfers       Dates: Start: 06/01/22         Goal: STG-Within one week, patient will transfer bed to chair with Max Ax1 via squat/stand pivot.       Dates: Start: 06/01/22               Problem: PT-Long Term Goals       Dates: Start: 06/01/22         Goal: LTG-By discharge, patient will propel wheelchair 50 ft mod I.       Dates: Start: 06/01/22             Goal: LTG-By discharge, patient will ambulate 50 ft with LRAD and Min A.       Dates: Start: 06/01/22            Goal: LTG-By discharge, patient will transfer one surface to another with CGA and LRAD.       Dates: Start: 06/01/22            Goal: LTG-By discharge, patient will transfer in/out of a car with CGA and LRAD.       Dates: Start: 06/01/22            Goal: LTG-By discharge, patient will perform bed mobility independently.       Dates: Start: 06/01/22

## 2022-06-16 NOTE — THERAPY
Occupational Therapy  Daily Treatment     Patient Name: Av Bob  Age:  77 y.o., Sex:  male  Medical Record #: [id number]  Today's Date: 6/16/2022     Precautions  Precautions: Fall Risk  Comments: L eric from previous CVA poor OOB tolerance, wounds on buttocks, positive cdiff    Safety   ADL Safety : Requires Physical Assist for Safety (2 person)    Subjective    Pt awake in bed, agreeable to OT session though was confused on where he was.     Objective       06/16/22 0831   OT Charge Group   OT Self Care / ADL 2   OT Therapy Activity 1   OT Total Time Spent   OT Individual Total Time Spent (Mins) 60   Functional Level of Assist   Upper Body Dressing Moderate Assist  (seated EOB)   Lower Body Dressing Total Assist x 2  (Max Ax1 for stanidng, Max A x1 to complete dressing)   Toileting Total Assist x 2  (rolling supine in bed, incontience of bowel and bladder)   Interdisciplinary Plan of Care Collaboration   IDT Collaboration with  Therapy Tech;Nursing   Patient Position at End of Therapy In Bed;Call Light within Reach;Tray Table within Reach   Collaboration Comments RN for update on precuations, Therapy tech for A during session     Completed 3 STS at EOB with Max A x1 and Min A x1 with therapy tech to unload sacrum for pressure relief.    Bed mobility variable throughout session from Max Ax1 to total A x1 (Max x1, Min-Mod x1)    Pt had increased flexion of L elbow and wrist upon arrival. Complete PROM of LUE with rubbing of biceps throughout session with L elbow ranging beyond 90* extension during session (unable to complete measurement during session)    Assessment    Pt tolerated session with a focus on ADLs, Sit<>Stand from EOB, and LUE stretching. Pt continues to be unaware of bowel and bladder urge with incontinence of bowel and bladder during session. Compelted UB dressing into T-shirt and pants seated EOB, but required to doff pants total Ax2 d/t incontinence.    Strengths: Motivated for self  care and independence, Pleasant and cooperative, Supportive family, Willingly participates in therapeutic activities  Barriers: Confused, Decreased endurance, Fatigue, Generalized weakness, Impaired activity tolerance, Impaired balance, Impaired functional cognition, Limited mobility    Plan    Monitor LUE (skin/circulation, comfort) -serial casting removed on 6/14  Trial compression glove for L hand swelling, provide options for LUE resting hand splint    Pressure sore prevention/mgmt, Attention to task, ADLs, rolling, 1 step directions, anterior weight shift L and R for progression of functional transfers, seated balance, STS    Occupational Therapy Goals (Active)       Problem: Bathing       Dates: Start: 06/01/22         Goal: STG-Within one week, patient will bathe w/ max A using DME as needed.        Dates: Start: 06/01/22         Goal Note filed on 06/15/22 1550 by Yara Aleman OT       Pt cont to require Total x2 for shower                 Problem: Dressing       Dates: Start: 06/01/22         Goal: STG-Within one week, patient will dress LB w/ mod A.        Dates: Start: 06/01/22         Goal Note filed on 06/15/22 1550 by Yara Aleman OT       Pt cont to require Total x2 for LB dressing                 Problem: Functional Transfers       Dates: Start: 06/01/22         Goal: STG-Within one week, patient will transfer to toilet w/ max A using DME as needed.       Dates: Start: 06/01/22         Goal Note filed on 06/15/22 1550 by Yara Aleman OT       Pt cont to require Total x2 for toilet transfer              Goal: STG-Within one week, patient will transfer to step in shower with max A using DME as needed.       Dates: Start: 06/01/22         Goal Note filed on 06/15/22 1550 by Yara Aleman OT       Pt cont to require Total x2 for shower transfer                 Problem: OT Long Term Goals       Dates: Start: 06/01/22         Goal: LTG-By discharge, patient will complete basic self care  tasks with min A using DME and AE as needed.       Dates: Start: 06/01/22            Goal: LTG-By discharge, patient will perform bathroom transfers w/ min A using DME and AE as needed.       Dates: Start: 06/01/22               Problem: Toileting       Dates: Start: 06/01/22         Goal: STG-Within one week, patient will complete toileting tasks w/ max A using DME as needed.        Dates: Start: 06/01/22         Goal Note filed on 06/15/22 4560 by Yara Aleman, OT       Pt cont to require Total x2 for toileting due to incontinence

## 2022-06-16 NOTE — PROGRESS NOTES
"[facility] Medicine Daily Progress Note    Date of Service  6/16/2022    Chief Complaint:  Hypertension  Diabetes  Leukocytosis    Interval History:  Has complaints of the night nurse not responding to his call for pain, he eventually fell asleep, and then the nurse came in with an \"attitude\" (per patient) -- will d/w the Charge Nurse today.    Review of Systems  Review of Systems   Constitutional: Negative for fever.   Eyes: Negative for blurred vision.   Respiratory: Negative for cough.    Cardiovascular: Negative for chest pain.   Gastrointestinal: Negative for diarrhea.   Musculoskeletal: Negative for joint pain.   Neurological: Negative for dizziness.   Psychiatric/Behavioral: The patient is not nervous/anxious.         Physical Exam  Temp:  [36.6 °C (97.8 °F)-36.7 °C (98 °F)] 36.6 °C (97.8 °F)  Pulse:  [57-70] 60  Resp:  [17-18] 18  BP: (126-138)/(61-73) 138/72  SpO2:  [94 %-98 %] 94 %    Physical Exam  Vitals and nursing note reviewed.   Constitutional:       Appearance: He is not diaphoretic.   HENT:      Mouth/Throat:      Pharynx: No oropharyngeal exudate or posterior oropharyngeal erythema.   Eyes:      Extraocular Movements: Extraocular movements intact.   Neck:      Vascular: No carotid bruit.   Cardiovascular:      Rate and Rhythm: Normal rate and regular rhythm.      Heart sounds: No murmur heard.  Pulmonary:      Effort: Pulmonary effort is normal.      Breath sounds: Normal breath sounds. No stridor.   Abdominal:      General: Bowel sounds are normal.      Palpations: Abdomen is soft.   Musculoskeletal:      Right lower leg: No edema.      Left lower leg: No edema.   Skin:     General: Skin is warm and dry.      Findings: No rash.   Neurological:      Mental Status: He is alert and oriented to person, place, and time.   Psychiatric:         Mood and Affect: Mood normal.         Behavior: Behavior normal.         Fluids  No intake or output data in the 24 hours ending 06/16/22 0906    Laboratory    "   Recent Labs     22  0600   SODIUM 137   POTASSIUM 3.7   CHLORIDE 105   CO2 22   GLUCOSE 133*   BUN 23*   CREATININE 0.79   CALCIUM 8.1*                   Imaging    Assessment/Plan  Gout  Assessment & Plan  On Allopurinol    Hypokalemia  Assessment & Plan  K+: 3.7 ()  Off supplements  Note: pt has a hx of hypo-K+ (unknown reason) and takes supplements at home  Monitor    C. difficile colitis  Assessment & Plan  C. Diff (+)  S/P leukocytosis  S/P diarrhea   On oral Vanco (thru )    Vitamin D insufficiency  Assessment & Plan  Vit D: 29  On supplements    Azotemia  Assessment & Plan  Bun: 28 --> 23 ()  Encouraging fluid intake  Monitor    Essential hypertension, benign- (present on admission)  Assessment & Plan  BP ok  On Norvasc  On Cozaar  On Toprol XL  Note: on Flomax  Cont to monitor    Benign prostatic hyperplasia with urinary obstruction- (present on admission)  Assessment & Plan  S/P TURP in 2022 w/ Dr. Barry  Gross hematuria resolved w/ CBI x 24 hours on 22  Pt's wife reports that Urology told her to expect intermittent hematuria for months following TURP  But suspect bleeding may have been from left kidney hemorrhage (see CT Abd/Pelvis 22)  Needs  F/U    Normocytic anemia- (present on admission)  Assessment & Plan  Hb: 10.4  Fe 20, sats 13%  Now on Fe supplements    GERD with esophagitis- (present on admission)  Assessment & Plan  On Prilosec    Recurrent UTI- (present on admission)  Assessment & Plan  Has hx of recurrent UTI  Was adm to [facility] for UTI, diarrhea, and confusion  S/P recent UTI, s/p abx  Note: pt self caths at home    Depression  Assessment & Plan  On Prozac    Hypomagnesemia- (present on admission)  Assessment & Plan  M.3 () --> 1.1 ()  Off supplements --> will restart supplements  Will give IV Mg x 1 dose  Note: pt has a hx of hypo-mg (unknown reason) and takes supplements at home  Monitor    H/O Cerebrovascular accident (CVA) in adulthood- (present  on admission)  Assessment & Plan  On Plavix and Lipitor    Type 2 diabetes mellitus, with long-term current use of insulin (HCC)- (present on admission)  Assessment & Plan  Hba1c: 6.6 (6/1)  BS: 127-201  Off Glargine (had recent lower BS)  Managing with SS coverage or now  Note: home meds include Trulicity 3 mg weekly, Toujeo 5-12 units daily, Humalog 5 units base and per SS  Cont to monitor

## 2022-06-16 NOTE — CARE PLAN
Problem: VTE Prevention  Goal: Patient will remain free from venous thromboembolism (VTE)  Outcome: Progressing  Note: Pt is up and walking, participates in therapies, and up to dinning room for all meals.    The patient is Stable - Low risk of patient condition declining or worsening    Shift Goals  Clinical Goals: safety and comfort  Patient Goals: pain control    Progress made toward(s) clinical / shift goals:  no s/s dvt    Patient is not progressing towards the following goals:

## 2022-06-16 NOTE — THERAPY
Occupational Therapy  Daily Treatment     Patient Name: Av Bob  Age:  77 y.o., Sex:  male  Medical Record #: [id number]  Today's Date: 6/16/2022     Precautions  Precautions: Fall Risk  Comments: L eric from previous CVA poor OOB tolerance, wounds on buttocks, positive cdiff    Safety   ADL Safety : Requires Physical Assist for Safety (2 person)    Subjective    Pt in bed upon arrival with his wife at bedside, agreeable to OT session.      Objective     06/16/22 1401   OT Charge Group   OT Self Care / ADL 2   OT Total Time Spent   OT Individual Total Time Spent (Mins) 30   Functional Level of Assist   Grooming Standby Assist;Seated   Grooming Description Increased time;Initial preparation for task;Seated in wheelchair at sink;Supervision for safety;Verbal cueing;Set-up of equipment   Lower Body Dressing Total Assist x 2  (doff pants from hips and brief change supine in bed)   Lower Body Dressing Description Assistive devices;Increased time;Initial preparation for task;Set-up of equipment;Supervision for safety;Verbal cueing  (use of bed rails to roll side to side)   Toileting Total Assist x 2  (soiled brief, cleaned and changed supine in bed)   Toileting Description Assist for hygiene;Assist to pull pants up;Assist to pull pants down;Increased time;Initial preparation for task;Set-up of equipment;Supervision for safety;Verbal cueing   Bed, Chair, Wheelchair Transfer Maximal Assist   Bed Chair Wheelchair Transfer Description Squat pivot transfer to wheelchair;Initial preparation for task;Adaptive equipment;Verbal cueing   Sitting Upper Body Exercises   Comments Seated in w/c, wrist and elbow PROM flexion/extension. Wrist extension ~ 30* and elbow extension lacking 100* passively   Bed Mobility    Supine to Sit Maximal Assist  (HOB flat)   Scooting Moderate Assist   Rolling Moderate Assist to Lt.;Moderate Assist to Rt.   Interdisciplinary Plan of Care Collaboration   IDT Collaboration with  Family /  Caregiver   Patient Position at End of Therapy Seated;Self Releasing Lap Belt Applied;Call Light within Reach;Tray Table within Reach;Phone within Reach;Family / Friend in Room   Collaboration Comments Left up in w/c for speech session, wife present       Assessment    Pt tolerated OT session fair focused on supine brief change/hygiene/LBD, bed txfer, g/h at sink and brief LUE wrist and elbow PROM. Pt yelled out during bed mobility supine -> sit but did not answer when asked if it was due to pain or fear. Pt c/o increased sacral pain sitting in w/c and was slightly sacral sitting so assisted pt with repositioning and pt reported improvement. Was able to brush his teeth and wash his face with warm washcloth seated in w/c at sink with setup and SBA. Pt declined pain during elbow extension PROM but lurched forward at ~ -100* extension. Pt agreeable to stay up in w/c at end of session for immediate ST session after.     Strengths: Motivated for self care and independence, Pleasant and cooperative, Supportive family, Willingly participates in therapeutic activities  Barriers: Confused, Decreased endurance, Fatigue, Generalized weakness, Impaired activity tolerance, Impaired balance, Impaired functional cognition, Limited mobility    Plan    Monitor LUE (skin/circulation, comfort) -serial casting removed on 6/14  Trial compression glove for L hand swelling, provide options for LUE resting hand splint     Pressure sore prevention/mgmt, Attention to task, ADLs, rolling, 1 step directions, anterior weight shift L and R for progression of functional transfers, seated balance, STS    Passport items to be completed:  Perform bathroom transfers, complete dressing, complete feeding, get ready for the day, prepare a simple meal, participate in household tasks, adapt home for safety needs, demonstrate home exercise program, complete caregiver training     Occupational Therapy Goals (Active)       Problem: Bathing       Dates:  Start: 06/01/22         Goal: STG-Within one week, patient will bathe w/ max A using DME as needed.        Dates: Start: 06/01/22         Goal Note filed on 06/15/22 1550 by Yara Aleman OT       Pt cont to require Total x2 for shower                 Problem: Dressing       Dates: Start: 06/01/22         Goal: STG-Within one week, patient will dress LB w/ mod A.        Dates: Start: 06/01/22         Goal Note filed on 06/15/22 1550 by Yara Aleman OT       Pt cont to require Total x2 for LB dressing                 Problem: Functional Transfers       Dates: Start: 06/01/22         Goal: STG-Within one week, patient will transfer to toilet w/ max A using DME as needed.       Dates: Start: 06/01/22         Goal Note filed on 06/15/22 1550 by Yara Aleman OT       Pt cont to require Total x2 for toilet transfer              Goal: STG-Within one week, patient will transfer to step in shower with max A using DME as needed.       Dates: Start: 06/01/22         Goal Note filed on 06/15/22 1550 by Yara Aleman OT       Pt cont to require Total x2 for shower transfer                 Problem: OT Long Term Goals       Dates: Start: 06/01/22         Goal: LTG-By discharge, patient will complete basic self care tasks with min A using DME and AE as needed.       Dates: Start: 06/01/22            Goal: LTG-By discharge, patient will perform bathroom transfers w/ min A using DME and AE as needed.       Dates: Start: 06/01/22               Problem: Toileting       Dates: Start: 06/01/22         Goal: STG-Within one week, patient will complete toileting tasks w/ max A using DME as needed.        Dates: Start: 06/01/22         Goal Note filed on 06/15/22 1550 by Yara Aleman OT       Pt cont to require Total x2 for toileting due to incontinence

## 2022-06-16 NOTE — CARE PLAN
Problem: Mobility  Goal: STG-Within one week, patient will propel wheelchair household distances 50 ft with Min A.  Outcome: Met     Problem: Mobility Transfers  Goal: STG-Within one week, patient will perform bed mobility with Mod A and bed functions as needed.  Outcome: Met     Problem: Mobility Transfers  Goal: STG-Within one week, patient will transfer bed to chair with Max Ax1 via squat/stand pivot.  Outcome: Not Met; requires second person for safety and performs inconsistently based on fatigue/pain

## 2022-06-16 NOTE — CARE PLAN
"Metoprolol and losartan held at 2100 hrs re; SBP less than 13- 135 per parameter. P had bm to a large stool .Kept dry and clean,. Pt in a ila bed, turned and repositioned in bed     Problem: Fall Risk - Rehab  Goal: Patient will remain free from falls  Outcome: Progressing  Note: Camilla Gruber Fall risk Assessment Score: 16    Moderate fall risk Interventions  - Bed and strip alarm   - Yellow sign by the door   - Yellow wrist band \"Fall risk\"  - Room near to the nurse station  - Do not leave patient unattended in the bathroom  - Fall risk education provided       Problem: Bladder / Voiding  Goal: Patient will establish and maintain regular urinary output  Outcome: Progressing  Note: Pt continent/ incontinent of urine, cleanse kept dry and clean.     Problem: Skin Integrity  Goal: Patient's skin integrity will be maintained or improve  Outcome: Progressing  Note: Sacral area dressing changed, abd pad applied to keep dressing in place.     Problem: Diabetes Management  Goal: Patient's ability to maintain appropriate glucose levels will be maintained or improve  Outcome: Progressing  Note: Finger stick monitored ,f/s at hs- 177, due coverage given.   The patient is Watcher - Medium risk of patient condition declining or worsening    Shift Goals  Clinical Goals: safety and comfort  Patient Goals: pain control    Progress made toward(s) clinical / shift goals:  Pt free from fall and injury.Pain under control    "

## 2022-06-16 NOTE — CARE PLAN
Problem: Problem Solving STGs  Goal: STG-Within one week, patient will complete o-log with a final score of 25/30 over three consecutive days  Outcome: Not Met     Problem: Memory STGs  Goal: STG-Within one week, patient will remember safety precautions related to hospitalization with 75% accuracy.   Outcome: Not Met

## 2022-06-17 LAB
ALBUMIN SERPL BCP-MCNC: 2.5 G/DL (ref 3.2–4.9)
ALBUMIN/GLOB SERPL: 1 G/DL
ALP SERPL-CCNC: 202 U/L (ref 30–99)
ALT SERPL-CCNC: 15 U/L (ref 2–50)
ANION GAP SERPL CALC-SCNC: 10 MMOL/L (ref 7–16)
AST SERPL-CCNC: 23 U/L (ref 12–45)
BASOPHILS # BLD AUTO: 0.4 % (ref 0–1.8)
BASOPHILS # BLD: 0.04 K/UL (ref 0–0.12)
BILIRUB SERPL-MCNC: 0.5 MG/DL (ref 0.1–1.5)
BUN SERPL-MCNC: 20 MG/DL (ref 8–22)
CALCIUM SERPL-MCNC: 8 MG/DL (ref 8.5–10.5)
CHLORIDE SERPL-SCNC: 103 MMOL/L (ref 96–112)
CO2 SERPL-SCNC: 23 MMOL/L (ref 20–33)
CREAT SERPL-MCNC: 0.84 MG/DL (ref 0.5–1.4)
EOSINOPHIL # BLD AUTO: 0.42 K/UL (ref 0–0.51)
EOSINOPHIL NFR BLD: 4.1 % (ref 0–6.9)
ERYTHROCYTE [DISTWIDTH] IN BLOOD BY AUTOMATED COUNT: 44.5 FL (ref 35.9–50)
GFR SERPLBLD CREATININE-BSD FMLA CKD-EPI: 91 ML/MIN/1.73 M 2
GLOBULIN SER CALC-MCNC: 2.6 G/DL (ref 1.9–3.5)
GLUCOSE BLD STRIP.AUTO-MCNC: 116 MG/DL (ref 65–99)
GLUCOSE BLD STRIP.AUTO-MCNC: 183 MG/DL (ref 65–99)
GLUCOSE BLD STRIP.AUTO-MCNC: 184 MG/DL (ref 65–99)
GLUCOSE BLD STRIP.AUTO-MCNC: 186 MG/DL (ref 65–99)
GLUCOSE SERPL-MCNC: 130 MG/DL (ref 65–99)
HCT VFR BLD AUTO: 33.7 % (ref 42–52)
HGB BLD-MCNC: 11.1 G/DL (ref 14–18)
IMM GRANULOCYTES # BLD AUTO: 0.12 K/UL (ref 0–0.11)
IMM GRANULOCYTES NFR BLD AUTO: 1.2 % (ref 0–0.9)
LYMPHOCYTES # BLD AUTO: 1.05 K/UL (ref 1–4.8)
LYMPHOCYTES NFR BLD: 10.3 % (ref 22–41)
MAGNESIUM SERPL-MCNC: 1.3 MG/DL (ref 1.5–2.5)
MCH RBC QN AUTO: 28.6 PG (ref 27–33)
MCHC RBC AUTO-ENTMCNC: 32.9 G/DL (ref 33.7–35.3)
MCV RBC AUTO: 86.9 FL (ref 81.4–97.8)
MONOCYTES # BLD AUTO: 0.73 K/UL (ref 0–0.85)
MONOCYTES NFR BLD AUTO: 7.2 % (ref 0–13.4)
NEUTROPHILS # BLD AUTO: 7.82 K/UL (ref 1.82–7.42)
NEUTROPHILS NFR BLD: 76.8 % (ref 44–72)
NRBC # BLD AUTO: 0 K/UL
NRBC BLD-RTO: 0 /100 WBC
PHOSPHATE SERPL-MCNC: 3.1 MG/DL (ref 2.5–4.5)
PLATELET # BLD AUTO: 363 K/UL (ref 164–446)
PMV BLD AUTO: 9.9 FL (ref 9–12.9)
POTASSIUM SERPL-SCNC: 3.6 MMOL/L (ref 3.6–5.5)
PROT SERPL-MCNC: 5.1 G/DL (ref 6–8.2)
RBC # BLD AUTO: 3.88 M/UL (ref 4.7–6.1)
SODIUM SERPL-SCNC: 136 MMOL/L (ref 135–145)
WBC # BLD AUTO: 10.2 K/UL (ref 4.8–10.8)

## 2022-06-17 PROCEDURE — 36415 COLL VENOUS BLD VENIPUNCTURE: CPT

## 2022-06-17 PROCEDURE — 700102 HCHG RX REV CODE 250 W/ 637 OVERRIDE(OP): Performed by: HOSPITALIST

## 2022-06-17 PROCEDURE — 80053 COMPREHEN METABOLIC PANEL: CPT

## 2022-06-17 PROCEDURE — A9270 NON-COVERED ITEM OR SERVICE: HCPCS | Performed by: HOSPITALIST

## 2022-06-17 PROCEDURE — 85025 COMPLETE CBC W/AUTO DIFF WBC: CPT

## 2022-06-17 PROCEDURE — 83735 ASSAY OF MAGNESIUM: CPT

## 2022-06-17 PROCEDURE — 82962 GLUCOSE BLOOD TEST: CPT | Mod: 91

## 2022-06-17 PROCEDURE — 770010 HCHG ROOM/CARE - REHAB SEMI PRIVAT*

## 2022-06-17 PROCEDURE — 97530 THERAPEUTIC ACTIVITIES: CPT

## 2022-06-17 PROCEDURE — 99233 SBSQ HOSP IP/OBS HIGH 50: CPT | Performed by: PHYSICAL MEDICINE & REHABILITATION

## 2022-06-17 PROCEDURE — 99232 SBSQ HOSP IP/OBS MODERATE 35: CPT | Performed by: HOSPITALIST

## 2022-06-17 PROCEDURE — 97760 ORTHOTIC MGMT&TRAING 1ST ENC: CPT

## 2022-06-17 PROCEDURE — 97129 THER IVNTJ 1ST 15 MIN: CPT

## 2022-06-17 PROCEDURE — 700102 HCHG RX REV CODE 250 W/ 637 OVERRIDE(OP): Performed by: PHYSICAL MEDICINE & REHABILITATION

## 2022-06-17 PROCEDURE — 84100 ASSAY OF PHOSPHORUS: CPT

## 2022-06-17 PROCEDURE — 97130 THER IVNTJ EA ADDL 15 MIN: CPT

## 2022-06-17 PROCEDURE — 700111 HCHG RX REV CODE 636 W/ 250 OVERRIDE (IP): Performed by: HOSPITALIST

## 2022-06-17 PROCEDURE — A9270 NON-COVERED ITEM OR SERVICE: HCPCS | Performed by: PHYSICAL MEDICINE & REHABILITATION

## 2022-06-17 PROCEDURE — 97140 MANUAL THERAPY 1/> REGIONS: CPT

## 2022-06-17 RX ORDER — QUETIAPINE FUMARATE 25 MG/1
50 TABLET, FILM COATED ORAL NIGHTLY
Status: DISCONTINUED | OUTPATIENT
Start: 2022-06-17 | End: 2022-06-21 | Stop reason: HOSPADM

## 2022-06-17 RX ORDER — MAGNESIUM SULFATE HEPTAHYDRATE 40 MG/ML
2 INJECTION, SOLUTION INTRAVENOUS ONCE
Status: COMPLETED | OUTPATIENT
Start: 2022-06-17 | End: 2022-06-17

## 2022-06-17 RX ORDER — QUETIAPINE FUMARATE 25 MG/1
25 TABLET, FILM COATED ORAL 3 TIMES DAILY PRN
Status: DISCONTINUED | OUTPATIENT
Start: 2022-06-17 | End: 2022-06-21 | Stop reason: HOSPADM

## 2022-06-17 RX ADMIN — MAGNESIUM OXIDE TAB 400 MG (241.3 MG ELEMENTAL MG) 400 MG: 400 (241.3 MG) TAB at 07:48

## 2022-06-17 RX ADMIN — CLOPIDOGREL 75 MG: 75 TABLET, FILM COATED ORAL at 05:15

## 2022-06-17 RX ADMIN — VANCOMYCIN HYDROCHLORIDE 125 MG: KIT ORAL at 17:50

## 2022-06-17 RX ADMIN — OMEPRAZOLE 20 MG: 20 CAPSULE, DELAYED RELEASE ORAL at 07:48

## 2022-06-17 RX ADMIN — OXYCODONE HYDROCHLORIDE AND ACETAMINOPHEN 500 MG: 500 TABLET ORAL at 11:35

## 2022-06-17 RX ADMIN — QUETIAPINE FUMARATE 50 MG: 25 TABLET ORAL at 21:17

## 2022-06-17 RX ADMIN — ALLOPURINOL 100 MG: 100 TABLET ORAL at 05:15

## 2022-06-17 RX ADMIN — FERROUS SULFATE TAB 325 MG (65 MG ELEMENTAL FE) 325 MG: 325 (65 FE) TAB at 07:48

## 2022-06-17 RX ADMIN — AMLODIPINE BESYLATE 10 MG: 5 TABLET ORAL at 05:15

## 2022-06-17 RX ADMIN — VANCOMYCIN HYDROCHLORIDE 125 MG: KIT ORAL at 11:35

## 2022-06-17 RX ADMIN — Medication 1000 UNITS: at 07:48

## 2022-06-17 RX ADMIN — MAGNESIUM SULFATE HEPTAHYDRATE 2 G: 2 INJECTION, SOLUTION INTRAVENOUS at 12:40

## 2022-06-17 RX ADMIN — MAGNESIUM OXIDE TAB 400 MG (241.3 MG ELEMENTAL MG) 400 MG: 400 (241.3 MG) TAB at 21:13

## 2022-06-17 RX ADMIN — FLUOXETINE 40 MG: 20 CAPSULE ORAL at 07:48

## 2022-06-17 RX ADMIN — TAMSULOSIN HYDROCHLORIDE 0.4 MG: 0.4 CAPSULE ORAL at 07:48

## 2022-06-17 RX ADMIN — VANCOMYCIN HYDROCHLORIDE 125 MG: KIT ORAL at 05:15

## 2022-06-17 RX ADMIN — ATORVASTATIN CALCIUM 80 MG: 40 TABLET, FILM COATED ORAL at 21:13

## 2022-06-17 ASSESSMENT — ENCOUNTER SYMPTOMS
ABDOMINAL PAIN: 0
VOMITING: 0
CHILLS: 0
SHORTNESS OF BREATH: 0
NAUSEA: 0
FEVER: 0
DIARRHEA: 0
NERVOUS/ANXIOUS: 0

## 2022-06-17 ASSESSMENT — PATIENT HEALTH QUESTIONNAIRE - PHQ9
1. LITTLE INTEREST OR PLEASURE IN DOING THINGS: NOT AT ALL
SUM OF ALL RESPONSES TO PHQ9 QUESTIONS 1 AND 2: 0
2. FEELING DOWN, DEPRESSED, IRRITABLE, OR HOPELESS: NOT AT ALL

## 2022-06-17 ASSESSMENT — ACTIVITIES OF DAILY LIVING (ADL): TOILETING_LEVEL_OF_ASSIST_DESCRIPTION: ASSIST FOR HYGIENE;ASSIST TO PULL PANTS UP;ASSIST TO PULL PANTS DOWN

## 2022-06-17 ASSESSMENT — PAIN DESCRIPTION - PAIN TYPE: TYPE: ACUTE PAIN

## 2022-06-17 NOTE — THERAPY
Occupational Therapy  Daily Treatment     Patient Name: Av Bob  Age:  77 y.o., Sex:  male  Medical Record #: [id number]  Today's Date: 6/17/2022     Precautions  Precautions: Fall Risk  Comments: L eric from previous CVA poor OOB tolerance, wounds on buttocks, L resting hand splint when resting    Safety   ADL Safety : Requires Physical Assist for Safety (2 person)    Subjective    Pt agreeable to serial casting and fabricating splint for LUE during session     Objective       06/17/22 1031   OT Charge Group   OT Orthotics Treatment - Initial 3   OT Manual Therapy Technique 1   OT Total Time Spent   OT Individual Total Time Spent (Mins) 60   Precautions   Precautions Fall Risk   Comments L eric from previous CVA poor OOB tolerance, wounds on buttocks, L resting hand splint when resting   Functional Level of Assist   Lower Body Dressing Total Assist x 2   Lower Body Dressing Description Increased time;Initial preparation for task  (rolling supine in bed)   Toileting Total Assist x 2   Toileting Description Assist for hygiene;Assist to pull pants up;Assist to pull pants down   Interdisciplinary Plan of Care Collaboration   IDT Collaboration with  Family / Caregiver;Physical Therapist   Patient Position at End of Therapy In Bed;Family / Friend in Room;Phone within Reach;Tray Table within Reach   Collaboration Comments Pt's spouse present for session; Physical therapist assist w/ care   A total of 2 hours was spent with pt assisting PT with serial casting and fabricating resting hand split for LUE.      Prior to casting heat pack, massage, and stretching was used to increase LUE elbow extension. PT completed cast with assistance from narrator, see PT notes for information on serial cast.      Rest hand splint was fitted to pt's LUE w/ use of heat gun and scissors. 3 Velcro straps were added to splint to stabilize hand and wrist. Pt and pt's wife were educated on purpose of resting hand splint and wear  schedule. Pt is recommended to wear splint while sleeping and resting. Pt should take splint off if he feels any discomfort from edges or straps. Pt reported no pain or discomfort while wearing split while narrator was present.      Pt reported pain in buttock at end of session and was positioned on his L side to offload lower back and buttock areas.      Assessment     Pt tolerated session well. Pt's LUE had increased ROM compared to previous casting. Resting hand splint was fabricated so pt's hand and wrist are free for exercises and functional activities. Pt cont to require 2 person to completed hygiene and brief change in bed.   Strengths: Motivated for self care and independence, Pleasant and cooperative, Supportive family, Willingly participates in therapeutic activities  Barriers: Confused, Decreased endurance, Fatigue, Generalized weakness, Impaired activity tolerance, Impaired balance, Impaired functional cognition, Limited mobility     Plan     Monitor LUE (skin/circulation, comfort) -2 serial cast placed on 6/17 to be removed on 6/20  Trial compression glove for L hand swelling, adjust resting hand splint as needed     Pressure sore prevention/mgmt, Attention to task, ADLs, rolling, 1 step directions, anterior weight shift L and R for progression of functional transfers, seated balance, STS     Passport items to be completed:  Perform bathroom transfers, complete dressing, complete feeding, get ready for the day, prepare a simple meal, participate in household tasks, adapt home for safety needs, demonstrate home exercise program, complete caregiver training        Occupational Therapy Goals (Active)       Problem: Bathing       Dates: Start: 06/01/22         Goal: STG-Within one week, patient will bathe w/ max A using DME as needed.        Dates: Start: 06/01/22         Goal Note filed on 06/15/22 6420 by Yara Aleman OT       Pt cont to require Total x2 for shower                 Problem: Dressing        Dates: Start: 06/01/22         Goal: STG-Within one week, patient will dress LB w/ mod A.        Dates: Start: 06/01/22         Goal Note filed on 06/15/22 1550 by Yara Aleman OT       Pt cont to require Total x2 for LB dressing                 Problem: Functional Transfers       Dates: Start: 06/01/22         Goal: STG-Within one week, patient will transfer to toilet w/ max A using DME as needed.       Dates: Start: 06/01/22         Goal Note filed on 06/15/22 1550 by Yara Aleman OT       Pt cont to require Total x2 for toilet transfer              Goal: STG-Within one week, patient will transfer to step in shower with max A using DME as needed.       Dates: Start: 06/01/22         Goal Note filed on 06/15/22 1550 by Yara Aleman OT       Pt cont to require Total x2 for shower transfer                 Problem: OT Long Term Goals       Dates: Start: 06/01/22         Goal: LTG-By discharge, patient will complete basic self care tasks with min A using DME and AE as needed.       Dates: Start: 06/01/22            Goal: LTG-By discharge, patient will perform bathroom transfers w/ min A using DME and AE as needed.       Dates: Start: 06/01/22               Problem: Toileting       Dates: Start: 06/01/22         Goal: STG-Within one week, patient will complete toileting tasks w/ max A using DME as needed.        Dates: Start: 06/01/22         Goal Note filed on 06/15/22 1550 by Yara Aleman OT       Pt cont to require Total x2 for toileting due to incontinence

## 2022-06-17 NOTE — THERAPY
Physical Therapy   Daily Treatment     Patient Name: Av Bob  Age:  77 y.o., Sex:  male  Medical Record #: [id number]  Today's Date: 6/17/2022     Precautions  Precautions: (P) Fall Risk (Simultaneous filing. User may not have seen previous data.)  Comments: (P) L eric from previous CVA poor OOB tolerance, wounds on buttocks, L resting hand splint when resting (Simultaneous filing. User may not have seen previous data.)    Subjective    Patient in bed and agreeable to serial casting, wife present for session.     Objective       06/17/22 0931   PT Charge Group   PT Therapeutic Activities 4   PT Total Time Spent   PT Individual Total Time Spent (Mins) 60   Precautions   Precautions Fall Risk     Comments L eric from previous CVA poor OOB tolerance, wounds on buttocks, L resting hand splint when resting, LUE serial cast #2 6/17/22  (Please check skin)   Pain 0 - 10 Group   Location Buttock   Location Orientation Posterior   Bed Mobility    Rolling Moderate Assist to Rt.;Moderate Assist to Lt.   Interdisciplinary Plan of Care Collaboration   IDT Collaboration with  Family / Caregiver;Occupational Therapist;Physician     Patient Position at End of Therapy In Bed;Call Light within Reach;Tray Table within Reach;Phone within Reach;Family / Friend in Room     Collaboration Comments CLOF, serial casting, plans for home eval       Assisted OT with LUE ROM/stretching/tone management, as well as LUE hand splint fabrication. See OT note for details.    Serial cast #2 applied to LUE elbow. Patient able to attain PROM -50 degrees elbow extension, cast in -45 degrees elbow extension. Photos taken of skin pre-cast as well as post-casting below.             Patient and wife educated on continued serial casting management and skin checks.     Rolling multiple times L/R for incontinence management as well as for positioning in L sidelying for pressure relief.    Assessment    Patient tolerated session well, continues to be  limited by elbow ROM and tone however good tolerance to elbow serial cast. Patient and wife educated on cast and hand splint management to improve LUE positioning and function.    Strengths: Able to follow instructions, Motivated for self care and independence, Pleasant and cooperative, Supportive family, Willingly participates in therapeutic activities  Barriers: Bladder incontinence, Decreased endurance, Hemiparesis, Impaired activity tolerance, Impaired balance, Limited mobility    Plan    Bed mobility training, transfer training (squat pivot currently), standing tolerance/balance, gait training (currently using R wall rail), ROM/stretching, neuro re-ed for L hemibody, encourage anterior weight shifting/leaning, serial casting to LUE applied on 6/17/22 and removed on 6/20/22. Please check in with patient on comfort/adverse symptoms and skin checks. Home eval scheduled for Monday 6/20/22.     Passport items to be completed:  Get in/out of bed safely, in/out of a vehicle, safely use mobility device, walk or wheel around home/community, navigate up and down stairs, show how to get up/down from the ground, ensure home is accessible, demonstrate HEP, complete caregiver training      Physical Therapy Problems (Active)       Problem: Mobility Transfers       Dates: Start: 06/01/22         Goal: STG-Within one week, patient will transfer bed to chair with Max Ax1 via squat/stand pivot.       Dates: Start: 06/01/22               Problem: PT-Long Term Goals       Dates: Start: 06/01/22         Goal: LTG-By discharge, patient will propel wheelchair 50 ft mod I.       Dates: Start: 06/01/22            Goal: LTG-By discharge, patient will ambulate 50 ft with LRAD and Min A.       Dates: Start: 06/01/22            Goal: LTG-By discharge, patient will transfer one surface to another with CGA and LRAD.       Dates: Start: 06/01/22            Goal: LTG-By discharge, patient will transfer in/out of a car with CGA and LRAD.        Dates: Start: 06/01/22            Goal: LTG-By discharge, patient will perform bed mobility independently.       Dates: Start: 06/01/22

## 2022-06-17 NOTE — DISCHARGE PLANNING
Per IDT meeting recs, pt would benefit from SNF placement. [facility] ref given to Bonny to send out. Message left for CM to F/U as pt not available to discuss at this time.

## 2022-06-17 NOTE — CARE PLAN
Problem: Pain - Standard  Goal: Alleviation of pain or a reduction in pain to the patient’s comfort goal  Outcome: Progressing  Note: Patient able to verbalize pain level and verbalize an acceptable level of pain.    The patient is Stable - Low risk of patient condition declining or worsening    Shift Goals  Clinical Goals: safety and comfort  Patient Goals: pain control    Progress made toward(s) clinical / shift goals:  denies pain at this time    Patient is not progressing towards the following goals:

## 2022-06-17 NOTE — PROGRESS NOTES
[facility] Medicine Daily Progress Note    Date of Service  6/17/2022    Chief Complaint:  Hypertension  Diabetes  Leukocytosis    Interval History:  No complaints.  Doing ok.    Review of Systems  Review of Systems   Constitutional: Negative for chills and fever.   Respiratory: Negative for shortness of breath.    Cardiovascular: Negative for chest pain.   Gastrointestinal: Negative for abdominal pain, diarrhea, nausea and vomiting.   Psychiatric/Behavioral: The patient is not nervous/anxious.         Physical Exam  Temp:  [36.7 °C (98 °F)-36.7 °C (98.1 °F)] 36.7 °C (98.1 °F)  Pulse:  [63-79] 64  Resp:  [18] 18  BP: (133-142)/(69-79) 134/76  SpO2:  [94 %-97 %] 95 %    Physical Exam  Vitals and nursing note reviewed.   Constitutional:       Appearance: Normal appearance.   HENT:      Head: Atraumatic.   Eyes:      Conjunctiva/sclera: Conjunctivae normal.      Pupils: Pupils are equal, round, and reactive to light.   Cardiovascular:      Rate and Rhythm: Normal rate and regular rhythm.   Pulmonary:      Effort: Pulmonary effort is normal.      Breath sounds: Normal breath sounds.   Abdominal:      General: Bowel sounds are normal.      Palpations: Abdomen is soft.   Musculoskeletal:      Cervical back: Normal range of motion and neck supple.      Right lower leg: No edema.      Left lower leg: No edema.   Skin:     General: Skin is warm and dry.   Neurological:      Mental Status: He is alert and oriented to person, place, and time.   Psychiatric:         Mood and Affect: Mood normal.         Behavior: Behavior normal.         Fluids    Intake/Output Summary (Last 24 hours) at 6/17/2022 0923  Last data filed at 6/17/2022 0519  Gross per 24 hour   Intake 960 ml   Output --   Net 960 ml       Laboratory  Recent Labs     06/17/22  0536   WBC 10.2   RBC 3.88*   HEMOGLOBIN 11.1*   HEMATOCRIT 33.7*   MCV 86.9   MCH 28.6   MCHC 32.9*   RDW 44.5   PLATELETCT 363   MPV 9.9     Recent Labs     06/16/22  0600 06/17/22  0536    SODIUM 137 136   POTASSIUM 3.7 3.6   CHLORIDE 105 103   CO2 22 23   GLUCOSE 133* 130*   BUN 23* 20   CREATININE 0.79 0.84   CALCIUM 8.1* 8.0*                   Imaging    Assessment/Plan  Gout  Assessment & Plan  On Allopurinol    Hypokalemia  Assessment & Plan  K+: 3.6 ()  Off supplements  Note: pt has a hx of hypo-K+ (unknown reason) and takes supplements at home  Monitor    C. difficile colitis  Assessment & Plan  C. Diff (+)  S/P leukocytosis  S/P diarrhea   On oral Vanco (thru )    Vitamin D insufficiency  Assessment & Plan  Vit D: 29  On supplements    Azotemia  Assessment & Plan  Resolved  Bun: 28 --> 23 () --> 20 ()  Encouraging fluid intake  Monitor    Essential hypertension, benign- (present on admission)  Assessment & Plan  BP ok  On Norvasc  On Cozaar  On Toprol XL  Note: on Flomax  Cont to monitor    Benign prostatic hyperplasia with urinary obstruction- (present on admission)  Assessment & Plan  S/P TURP in 2022 w/ Dr. Barry  Gross hematuria resolved w/ CBI x 24 hours on 22  Pt's wife reports that Urology told her to expect intermittent hematuria for months following TURP  But suspect bleeding may have been from left kidney hemorrhage (see CT Abd/Pelvis 22)  Needs  F/U    Normocytic anemia- (present on admission)  Assessment & Plan  Hb: 10.4  Fe 20, sats 13%  On Fe supplements    GERD with esophagitis- (present on admission)  Assessment & Plan  On Prilosec    Recurrent UTI- (present on admission)  Assessment & Plan  Has hx of recurrent UTI  [place] [facility] [place], [place] and [place]  S/P recent UTI, s/p abx  Note: pt self caths at home    Depression  Assessment & Plan  On Prozac    Hypomagnesemia- (present on admission)  Assessment & Plan  M.3 () --> 1.1 () --> 1.3 ()  S/P IV Mg 2g x 1 dose  On oral supplements  Will give another IV Mg 2 g x 1 dose  Note: pt has a hx of hypo-mg (unknown reason) and takes supplements at home  Monitor    H/O  Cerebrovascular accident (CVA) in adulthood- (present on admission)  Assessment & Plan  On Plavix and Lipitor    Type 2 diabetes mellitus, with long-term current use of insulin (HCC)- (present on admission)  Assessment & Plan  Hba1c: 6.6 (6/1)  BS: 116-210  Off Glargine (had recent lower BS)  Managing with SS coverage or now  Note: home meds include Trulicity 3 mg weekly, Toujeo 5-12 units daily, Humalog 5 units base and per SS  Cont to monitor

## 2022-06-17 NOTE — PROGRESS NOTES
Rehab Progress Note     Encounter Date: 6/17/2022    CC: Decreased mobility, confusion    Interval Events (Subjective)  Patient sitting up in room. He is being recasted again for serial casting but only at the elbow. He reports he is fine now that his wife is here. He would like to go home. Discussed we are still working on things to make him safer when he leaves. His wife and staff reported he was upset and confused. His wife reports he has sundowned in the past but is easier to redirect at home. Discussed will start low dose Seroquel and monitor.     IDT Team Meeting 6/16/2022  DC/Disposition:  6/21/22    Objective:  VITAL SIGNS: /76   Pulse 64   Temp 36.7 °C (98.1 °F) (Oral)   Resp 18   Ht 1.829 m (6')   Wt 85.5 kg (188 lb 7.9 oz)   SpO2 95%   BMI 25.56 kg/m²   Gen: NAD  Psych: Mood and affect appropriate  CV: RRR, no edema  Resp: CTAB, no upper airway sounds  Abd: NTND  Neuro: AOx3, left elbow casted, wiggling fingers    Recent Results (from the past 72 hour(s))   POCT glucose device results    Collection Time: 06/14/22  5:09 PM   Result Value Ref Range    POC Glucose, Blood 173 (H) 65 - 99 mg/dL   POCT glucose device results    Collection Time: 06/14/22  9:27 PM   Result Value Ref Range    POC Glucose, Blood 177 (H) 65 - 99 mg/dL   POCT glucose device results    Collection Time: 06/15/22  7:36 AM   Result Value Ref Range    POC Glucose, Blood 127 (H) 65 - 99 mg/dL   POCT glucose device results    Collection Time: 06/15/22 11:29 AM   Result Value Ref Range    POC Glucose, Blood 265 (H) 65 - 99 mg/dL   POCT glucose device results    Collection Time: 06/15/22  5:10 PM   Result Value Ref Range    POC Glucose, Blood 132 (H) 65 - 99 mg/dL   POCT glucose device results    Collection Time: 06/15/22  8:14 PM   Result Value Ref Range    POC Glucose, Blood 177 (H) 65 - 99 mg/dL   Basic Metabolic Panel    Collection Time: 06/16/22  6:00 AM   Result Value Ref Range    Sodium 137 135 - 145 mmol/L    Potassium  3.7 3.6 - 5.5 mmol/L    Chloride 105 96 - 112 mmol/L    Co2 22 20 - 33 mmol/L    Glucose 133 (H) 65 - 99 mg/dL    Bun 23 (H) 8 - 22 mg/dL    Creatinine 0.79 0.50 - 1.40 mg/dL    Calcium 8.1 (L) 8.5 - 10.5 mg/dL    Anion Gap 10.0 7.0 - 16.0   MAGNESIUM    Collection Time: 06/16/22  6:00 AM   Result Value Ref Range    Magnesium 1.1 (L) 1.5 - 2.5 mg/dL   PHOSPHORUS    Collection Time: 06/16/22  6:00 AM   Result Value Ref Range    Phosphorus 3.4 2.5 - 4.5 mg/dL   ESTIMATED GFR    Collection Time: 06/16/22  6:00 AM   Result Value Ref Range    GFR (CKD-EPI) 93 >60 mL/min/1.73 m 2   POCT glucose device results    Collection Time: 06/16/22  7:39 AM   Result Value Ref Range    POC Glucose, Blood 127 (H) 65 - 99 mg/dL   POCT glucose device results    Collection Time: 06/16/22 11:30 AM   Result Value Ref Range    POC Glucose, Blood 203 (H) 65 - 99 mg/dL   POCT glucose device results    Collection Time: 06/16/22  4:54 PM   Result Value Ref Range    POC Glucose, Blood 178 (H) 65 - 99 mg/dL   POCT glucose device results    Collection Time: 06/16/22  9:16 PM   Result Value Ref Range    POC Glucose, Blood 210 (H) 65 - 99 mg/dL   CBC WITH DIFFERENTIAL    Collection Time: 06/17/22  5:36 AM   Result Value Ref Range    WBC 10.2 4.8 - 10.8 K/uL    RBC 3.88 (L) 4.70 - 6.10 M/uL    Hemoglobin 11.1 (L) 14.0 - 18.0 g/dL    Hematocrit 33.7 (L) 42.0 - 52.0 %    MCV 86.9 81.4 - 97.8 fL    MCH 28.6 27.0 - 33.0 pg    MCHC 32.9 (L) 33.7 - 35.3 g/dL    RDW 44.5 35.9 - 50.0 fL    Platelet Count 363 164 - 446 K/uL    MPV 9.9 9.0 - 12.9 fL    Neutrophils-Polys 76.80 (H) 44.00 - 72.00 %    Lymphocytes 10.30 (L) 22.00 - 41.00 %    Monocytes 7.20 0.00 - 13.40 %    Eosinophils 4.10 0.00 - 6.90 %    Basophils 0.40 0.00 - 1.80 %    Immature Granulocytes 1.20 (H) 0.00 - 0.90 %    Nucleated RBC 0.00 /100 WBC    Neutrophils (Absolute) 7.82 (H) 1.82 - 7.42 K/uL    Lymphs (Absolute) 1.05 1.00 - 4.80 K/uL    Monos (Absolute) 0.73 0.00 - 0.85 K/uL    Eos  (Absolute) 0.42 0.00 - 0.51 K/uL    Baso (Absolute) 0.04 0.00 - 0.12 K/uL    Immature Granulocytes (abs) 0.12 (H) 0.00 - 0.11 K/uL    NRBC (Absolute) 0.00 K/uL   Comp Metabolic Panel    Collection Time: 06/17/22  5:36 AM   Result Value Ref Range    Sodium 136 135 - 145 mmol/L    Potassium 3.6 3.6 - 5.5 mmol/L    Chloride 103 96 - 112 mmol/L    Co2 23 20 - 33 mmol/L    Anion Gap 10.0 7.0 - 16.0    Glucose 130 (H) 65 - 99 mg/dL    Bun 20 8 - 22 mg/dL    Creatinine 0.84 0.50 - 1.40 mg/dL    Calcium 8.0 (L) 8.5 - 10.5 mg/dL    AST(SGOT) 23 12 - 45 U/L    ALT(SGPT) 15 2 - 50 U/L    Alkaline Phosphatase 202 (H) 30 - 99 U/L    Total Bilirubin 0.5 0.1 - 1.5 mg/dL    Albumin 2.5 (L) 3.2 - 4.9 g/dL    Total Protein 5.1 (L) 6.0 - 8.2 g/dL    Globulin 2.6 1.9 - 3.5 g/dL    A-G Ratio 1.0 g/dL   ESTIMATED GFR    Collection Time: 06/17/22  5:36 AM   Result Value Ref Range    GFR (CKD-EPI) 91 >60 mL/min/1.73 m 2   MAGNESIUM    Collection Time: 06/17/22  5:36 AM   Result Value Ref Range    Magnesium 1.3 (L) 1.5 - 2.5 mg/dL   PHOSPHORUS    Collection Time: 06/17/22  5:36 AM   Result Value Ref Range    Phosphorus 3.1 2.5 - 4.5 mg/dL   POCT glucose device results    Collection Time: 06/17/22  7:47 AM   Result Value Ref Range    POC Glucose, Blood 116 (H) 65 - 99 mg/dL   POCT glucose device results    Collection Time: 06/17/22 11:31 AM   Result Value Ref Range    POC Glucose, Blood 186 (H) 65 - 99 mg/dL       Current Facility-Administered Medications   Medication Frequency   • magnesium sulfate IVPB premix 2 g Once   • QUEtiapine (Seroquel) tablet 50 mg Nightly   • QUEtiapine (Seroquel) tablet 25 mg TID PRN   • magnesium oxide tablet 400 mg BID   • ferrous sulfate tablet 325 mg QDAY with Breakfast   • ascorbic acid (Vitamin C) tablet 500 mg DAILY   • diphenhydrAMINE-zinc acetate (BENADRYL ITCH) topical cream TID PRN   • traMADol (ULTRAM) 50 MG tablet 50 mg Q6HRS PRN   • vancomycin 50 mg/mL oral soln 125 mg Q6HRS   • miconazole 2%-zinc  oxide (Vinny) topical cream Q6HRS PRN   • insulin regular (HumuLIN R,NovoLIN R) injection 4X/DAY ACHS    And   • dextrose 50% (D50W) injection 25 g Q15 MIN PRN   • amLODIPine (NORVASC) tablet 10 mg Q DAY   • lidocaine (LIDODERM) 5 % 1 Patch Q24HR   • vitamin D3 (cholecalciferol) tablet 1,000 Units DAILY   • Respiratory Therapy Consult Continuous RT   • hydrALAZINE (APRESOLINE) tablet 25 mg Q8HRS PRN   • acetaminophen (Tylenol) tablet 650 mg Q4HRS PRN   • polyethylene glycol/lytes (MIRALAX) PACKET 1 Packet QDAY PRN    And   • magnesium hydroxide (MILK OF MAGNESIA) suspension 30 mL QDAY PRN    And   • bisacodyl (DULCOLAX) suppository 10 mg QDAY PRN   • omeprazole (PRILOSEC) capsule 20 mg DAILY   • artificial tears ophthalmic solution 1 Drop PRN   • benzocaine-menthol (Cepacol) lozenge 1 Lozenge Q2HRS PRN   • mag hydrox-al hydrox-simeth (MAALOX PLUS ES or MYLANTA DS) suspension 20 mL Q2HRS PRN   • ondansetron (ZOFRAN ODT) dispertab 4 mg 4X/DAY PRN    Or   • ondansetron (ZOFRAN) syringe/vial injection 4 mg 4X/DAY PRN   • traZODone (DESYREL) tablet 50 mg QHS PRN   • sodium chloride (OCEAN) 0.65 % nasal spray 2 Spray PRN   • midazolam (VERSED) 5 mg/mL (1 mL vial) PRN   • acetaminophen (Tylenol) tablet 650 mg Q6HRS PRN   • allopurinol (ZYLOPRIM) tablet 100 mg QDAY   • atorvastatin (LIPITOR) tablet 80 mg Q EVENING   • clopidogrel (PLAVIX) tablet 75 mg QDAY   • FLUoxetine (PROZAC) capsule 40 mg DAILY   • gabapentin (NEURONTIN) capsule 100 mg QHS PRN   • losartan (COZAAR) tablet 50 mg BID   • metoprolol SR (TOPROL XL) tablet 100 mg Q EVENING   • tamsulosin (FLOMAX) capsule 0.4 mg DAILY       Orders Placed This Encounter   Procedures   • Diet Order Diet: Consistent CHO (Diabetic); Second Modifier: (optional): Cardiac     Standing Status:   Standing     Number of Occurrences:   1     Order Specific Question:   Diet:     Answer:   Consistent CHO (Diabetic) [4]     Order Specific Question:   Second Modifier: (optional)     Answer:    Cardiac [6]       Assessment:  Active Hospital Problems    Diagnosis    • *Acute encephalopathy    • Hyponatremia    • Dehydration    • Essential hypertension, benign    • Benign prostatic hyperplasia with urinary obstruction    • Diarrhea    • GERD with esophagitis    • Recurrent UTI    • Hypomagnesemia    • Type 2 diabetes mellitus, with long-term current use of insulin (HCC)    • Stage 3b chronic kidney disease (HCC)        Medical Decision Making and Plan:  Acute toxic encephalopathy - Patient with UTI with urosepsis s/p IV antibiotics. Patient has urinary retention and requires occasional catheterization putting him at risk for recurrent UTIs. With Decreased cognition, balance and strength in setting of previous CVA  -PT and OT for mobility and ADLs  -SLP for cognition  -Follow-up with [facility]     Sundowning - Patient with confusion at night and early morning. Will start Seroquel at 1900 and PRN TID    Previous R CVA - Patient with contracture and spasticity on R side. On Plavix and statin  -Will start low dose Baclofen although most likely contracture. Serial casting to start 6/8, therapy holds otherwise for GI and leukocytosis. Most likely removal on 6/14. Will discontinue Baclofen as having some more confusion. Removal on 6/14 with improved ROM. Repeat casting 6/17    HTN/CAD - Patient on Plavix. Patient on Losartan 50 mg BID, Metoprolol 100 mg XL  -Consult Hospitalist. Recommending TTE     HLD - Patient on Atorvastatin 80 mg QHS     Leukocytosis - On treatment for UTI. On Ancef with recommendation to switch to Augment. Will check CBC in AM before switch  -Repeat 16.5 up from 16.1, consult Hospitalist. Improved to 12.7 on 6/5/22. Repeat 6/8 with Leukocytosis 17.9 and diffuse loose stools.   -Check C diff. Check UA. Check Blood cultures. KUB with distention and featureless colon. Discussed with hospitalist and start antibiotics including Flagyl. CT abdomen - possible small left cyst bleeding. Will  monitor CBC. On Ceftriaxone. Improving leukocytosis 11.7 on 6/10/22    C Diff positive on 6/8. On Vancomycin PO. Improving. Still having loose BMs on 6/13/22. Per hospitalist continue Vancomycin for 10 days  -No loose BMs in 48 hours. Having incontinence, will pause on discontinue contact precautions    Anemia - Check AM CBC - 12.2 Repeat 11.3, Fe low but with GI issues holding on starting 6/8. Will start now that GI under more control     DM2 with hyperglycemia - Patient on Glargine 11 U and SSI  -Consult hospitalist     Depression - Patient on Fluoxetine 40 mg daily      Hx of Gout - Patient on Allopurinol 100 mg daily     Urinary Retention - Patient requiring catheterization ~1 time daily. Follows with Urology. Continue Flomax  -Hematuria on 6/7 with significant amount. Hold Xarelto. Flush bladder.  Improved hematuria     Vitamin D Deficiency - 29 on admission. Start 1000 U     DVT Ppx - Patient on Xarelto ppx dose. On hold for bleeding.     Total time:  36 minutes.  I spent greater than 50% of the time for patient care, counseling, and coordination on this date, including unit/floor time, and face-to-face time with the patient as per interval events and assessment and plan above. Topics discussed included off contact precautions, completes vancomycin this weekend, casting with PT/OT, sundowning and start Seroquel scheduled/PRN.     Katheryn Pratt M.D.

## 2022-06-17 NOTE — DIETARY
Nutrition Services: Nutrition Rep (NR) concerned because pt is requesting 2 milks with breakfast. Pt has dx of DM and is on a consistent CHO (diabetic), cardiac diet. PO intake is good at % of most meals. POC glucose is > 200 at times. Additional milk wound add additional CHO making it difficult to stay below pt's upper limits of CHO at breakfast. Pt currently has an order for SSI. Hospitalist is following and making adjustments to meds as needed. RD visited pt in his room. He appeared to be sleeping but he responded to RD and said he was awake. Pt agreed to continue with one milk at breakfast instead of 2 so that other CHO choices will not be limited at that meal. RD will continue to follow weekly or PRN.

## 2022-06-17 NOTE — CARE PLAN
"The patient is Watcher - Medium risk of patient condition declining or worsening    Shift Goals  Clinical Goals: safety and comfort  Patient Goals: pain control    Progress made toward(s) clinical / shift goals:      Problem: Psychosocial  Goal: Patient's level of anxiety will decrease  Outcome: Progressing  Note: Pt with agitation at the beginning of this shift. He'd been crying and screaming of wanting to go home because he said he felt that he's \"abandoned\" and no one has been checking on him all day. This primary RN and charge RN talked and reassured him that we'll be checking him at least every hour tonight to make sure he's safe and comfortable. Per pt's demand we called the spouse via phone so they could talk to each other.After talking to his spouse and after charge RN Atrah have spent some good amount of time calming him down ;the pt finally agreed to stay for the night. Trazodone and other scheduled meds given as per MAR. Will continue to monitor.       Patient is not progressing towards the following goals:    Problem: Fall Risk - Rehab  Goal: Patient will remain free from falls  Outcome: Not Progressing  Note: Camilla Gruber Fall risk Assessment Score: 18    High fall risk Interventions   - Alarming seatbelt  - Bed and strip alarm   - Yellow sign by the door   - Yellow wrist band \"Fall risk\"  - Do not leave patient unattended in the bathroom  - Fall risk education provided       "

## 2022-06-17 NOTE — THERAPY
Speech Language Pathology  Daily Treatment     Patient Name: Av Bob  Age:  77 y.o., Sex:  male  Medical Record #: [id number]  Today's Date: 6/17/2022     Precautions  Precautions: Fall Risk  Comments: L eric from previous CVA poor OOB tolerance, wounds on buttocks, L resting hand splint when resting    Subjective    Pt seen at bedside, spouse present and supportive during session.      Objective       06/17/22 1301   Treatment Charges   SLP Cognitive Skill Development First 15 Minutes 1   SLP Cognitive Skill Development Additional 15 Minutes 1   SLP Total Time Spent   SLP Individual Total Time Spent (Mins) 30   Cognition   Simple Attention Moderate (3)   Orientation  Moderate (3)   Sleep/Wake Cycle   Sleep & Rest Awake   Interdisciplinary Plan of Care Collaboration   IDT Collaboration with  Family / Caregiver   Patient Position at End of Therapy In Bed;Call Light within Reach;Tray Table within Reach;Phone within Reach;Family / Friend in Room   Collaboration Comments Spouse present for session.       Assessment    Pt obtained 22/30 on O-Log which is an increase of two points from yesterday's session. Pt req'd MAX verbal cues to verbally sequence transfer from bed to WC. Problem solving frustration over night time interruptions and not receiving assistance when he calls nursing staff, pt could not clarify if he was able to sleep or not. Spouse reports that she had been called around 9:45pm by charge nurse in an attempt to calm pt, which did help.     Strengths: Supportive family  Barriers: Impaired functional cognition, Impaired activity tolerance, Impaired carryover of learning    Plan    Continue to address memory, orientation.     Passport items to be completed:  Express basic needs, understand food/liquid recommendations, consistently follow swallow precautions, manage finances, manage medications, arrive to therapy appointments on time, complete daily memory log entries, solve problems related to  safety situations, review education related to hospitalization, complete caregiver training     Speech Therapy Problems (Active)       Problem: Memory STGs       Dates: Start: 06/01/22         Goal: STG-Within one week, patient will remember safety precautions related to hospitalization with 75% accuracy.        Dates: Start: 06/01/22         Goal Note filed on 06/08/22 1452 by Harpal Williamson MS,CCC-SLP       Mod-max A for pt to recall new information related to his current hospitalization                  Problem: Problem Solving STGs       Dates: Start: 06/01/22         Goal: STG-Within one week, patient will complete o-log with a final score of 25/30 over three consecutive days       Dates: Start: 06/01/22         Goal Note filed on 06/08/22 1452 by Harpal Williamson MS,CCC-SLP       Pt's highest O-log score to date was 23/30                  Problem: Speech/Swallowing LTGs       Dates: Start: 06/01/22         Goal: LTG-By discharge, patient will solve basic problems in order to d/c home with SPV       Dates: Start: 06/01/22

## 2022-06-18 ENCOUNTER — ANCILLARY PROCEDURE (OUTPATIENT)
Dept: CARDIOLOGY | Facility: REHABILITATION | Age: 77
DRG: 092 | End: 2022-06-18
Attending: PHYSICAL MEDICINE & REHABILITATION
Payer: MEDICARE

## 2022-06-18 LAB
GLUCOSE BLD STRIP.AUTO-MCNC: 104 MG/DL (ref 65–99)
GLUCOSE BLD STRIP.AUTO-MCNC: 166 MG/DL (ref 65–99)
GLUCOSE BLD STRIP.AUTO-MCNC: 181 MG/DL (ref 65–99)
GLUCOSE BLD STRIP.AUTO-MCNC: 188 MG/DL (ref 65–99)

## 2022-06-18 PROCEDURE — 97129 THER IVNTJ 1ST 15 MIN: CPT

## 2022-06-18 PROCEDURE — 770010 HCHG ROOM/CARE - REHAB SEMI PRIVAT*

## 2022-06-18 PROCEDURE — 97130 THER IVNTJ EA ADDL 15 MIN: CPT

## 2022-06-18 PROCEDURE — A9270 NON-COVERED ITEM OR SERVICE: HCPCS | Performed by: PHYSICAL MEDICINE & REHABILITATION

## 2022-06-18 PROCEDURE — 700102 HCHG RX REV CODE 250 W/ 637 OVERRIDE(OP): Performed by: PHYSICAL MEDICINE & REHABILITATION

## 2022-06-18 PROCEDURE — A9270 NON-COVERED ITEM OR SERVICE: HCPCS | Performed by: HOSPITALIST

## 2022-06-18 PROCEDURE — 700101 HCHG RX REV CODE 250: Performed by: PHYSICAL MEDICINE & REHABILITATION

## 2022-06-18 PROCEDURE — 99232 SBSQ HOSP IP/OBS MODERATE 35: CPT | Performed by: HOSPITALIST

## 2022-06-18 PROCEDURE — 99232 SBSQ HOSP IP/OBS MODERATE 35: CPT | Performed by: PHYSICAL MEDICINE & REHABILITATION

## 2022-06-18 PROCEDURE — 700102 HCHG RX REV CODE 250 W/ 637 OVERRIDE(OP): Performed by: HOSPITALIST

## 2022-06-18 PROCEDURE — 82962 GLUCOSE BLOOD TEST: CPT | Mod: 91

## 2022-06-18 PROCEDURE — 93970 EXTREMITY STUDY: CPT

## 2022-06-18 RX ORDER — BACLOFEN 10 MG/1
5 TABLET ORAL 3 TIMES DAILY PRN
Status: DISCONTINUED | OUTPATIENT
Start: 2022-06-18 | End: 2022-06-21 | Stop reason: HOSPADM

## 2022-06-18 RX ADMIN — VANCOMYCIN HYDROCHLORIDE 125 MG: KIT ORAL at 05:52

## 2022-06-18 RX ADMIN — AMLODIPINE BESYLATE 10 MG: 5 TABLET ORAL at 05:48

## 2022-06-18 RX ADMIN — VANCOMYCIN HYDROCHLORIDE 125 MG: KIT ORAL at 17:16

## 2022-06-18 RX ADMIN — RIVAROXABAN 15 MG: 15 TABLET, FILM COATED ORAL at 18:07

## 2022-06-18 RX ADMIN — Medication 1000 UNITS: at 08:33

## 2022-06-18 RX ADMIN — OMEPRAZOLE 20 MG: 20 CAPSULE, DELAYED RELEASE ORAL at 08:34

## 2022-06-18 RX ADMIN — ACETAMINOPHEN 650 MG: 325 TABLET ORAL at 12:42

## 2022-06-18 RX ADMIN — TAMSULOSIN HYDROCHLORIDE 0.4 MG: 0.4 CAPSULE ORAL at 08:34

## 2022-06-18 RX ADMIN — QUETIAPINE FUMARATE 50 MG: 25 TABLET ORAL at 19:13

## 2022-06-18 RX ADMIN — VANCOMYCIN HYDROCHLORIDE 125 MG: KIT ORAL at 23:43

## 2022-06-18 RX ADMIN — LOSARTAN POTASSIUM 50 MG: 25 TABLET, FILM COATED ORAL at 08:34

## 2022-06-18 RX ADMIN — LIDOCAINE 1 PATCH: 50 PATCH TOPICAL at 12:43

## 2022-06-18 RX ADMIN — FLUOXETINE 40 MG: 20 CAPSULE ORAL at 08:34

## 2022-06-18 RX ADMIN — VANCOMYCIN HYDROCHLORIDE 125 MG: KIT ORAL at 11:52

## 2022-06-18 RX ADMIN — ALLOPURINOL 100 MG: 100 TABLET ORAL at 05:48

## 2022-06-18 RX ADMIN — BACLOFEN 5 MG: 10 TABLET ORAL at 17:17

## 2022-06-18 RX ADMIN — MAGNESIUM OXIDE TAB 400 MG (241.3 MG ELEMENTAL MG) 400 MG: 400 (241.3 MG) TAB at 17:16

## 2022-06-18 RX ADMIN — MAGNESIUM OXIDE TAB 400 MG (241.3 MG ELEMENTAL MG) 400 MG: 400 (241.3 MG) TAB at 08:33

## 2022-06-18 RX ADMIN — OXYCODONE HYDROCHLORIDE AND ACETAMINOPHEN 500 MG: 500 TABLET ORAL at 08:34

## 2022-06-18 RX ADMIN — ATORVASTATIN CALCIUM 80 MG: 40 TABLET, FILM COATED ORAL at 17:17

## 2022-06-18 RX ADMIN — TRAMADOL HYDROCHLORIDE 50 MG: 50 TABLET, COATED ORAL at 09:25

## 2022-06-18 RX ADMIN — VANCOMYCIN HYDROCHLORIDE 125 MG: KIT ORAL at 00:16

## 2022-06-18 RX ADMIN — CLOPIDOGREL 75 MG: 75 TABLET, FILM COATED ORAL at 05:48

## 2022-06-18 RX ADMIN — FERROUS SULFATE TAB 325 MG (65 MG ELEMENTAL FE) 325 MG: 325 (65 FE) TAB at 08:34

## 2022-06-18 RX ADMIN — TRAZODONE HYDROCHLORIDE 50 MG: 50 TABLET ORAL at 20:13

## 2022-06-18 RX ADMIN — DIPHENHYDRAMINE HYDROCHLORIDE, ZINC ACETATE: 2; .1 CREAM TOPICAL at 20:13

## 2022-06-18 ASSESSMENT — ENCOUNTER SYMPTOMS
FEVER: 0
NAUSEA: 0
DIZZINESS: 0
HALLUCINATIONS: 0
PALPITATIONS: 0
VOMITING: 0
HEADACHES: 0
BLURRED VISION: 0
SHORTNESS OF BREATH: 0

## 2022-06-18 ASSESSMENT — PAIN DESCRIPTION - PAIN TYPE
TYPE: ACUTE PAIN

## 2022-06-18 NOTE — CARE PLAN
"The patient is Stable - Low risk of patient condition declining or worsening    Shift Goals  Clinical Goals: safety and comfort  Patient Goals: pain control    Problem: Skin Integrity  Goal: Skin integrity is maintained or improved  Outcome: Progressing  Note:   Andrea Score: 13    Patient's skin remains intact and free from new or accidental injury this shift; no s/s of infection. RN wound protocol checked. Encouraged hydration and educated about the importance of nutrition to keep skin integrity. Will continue to monitor.       Problem: Fall Risk - Rehab  Goal: Patient will remain free from falls  Outcome: Progressing  Note: Camilla Gruber Fall risk Assessment Score: 16    High fall risk Interventions   - Bed and strip alarm   - Yellow sign by the door   - Yellow wrist band \"Fall risk\"  - Room near to the nurse station  - Do not leave patient unattended in the bathroom  - Fall risk education provided     "

## 2022-06-18 NOTE — CARE PLAN
"  Problem: Psychosocial  Goal: Patient's ability to verbalize feelings about condition will improve  Outcome: Progressing  Note: Pt cries randomly and is usually unable to state why he is emotional   The patient is Stable - Low risk of patient condition declining or worsening    Shift Goals  Clinical Goals: safety and comfort  Patient Goals: pain control    Progress made toward(s) clinical / shift goals:  Stated he is \"sad\" but was unable or unwilling to state why    Patient is not progressing towards the following goals:      "

## 2022-06-18 NOTE — PROGRESS NOTES
Received shift report and assumed care of patient.  Patient sleeping and having difficulty waking.Positioned in bed for comfort and safety; call light within reach.  Denies need for pain medication but cries out that his legs hurt.  Will continue to monitor.

## 2022-06-18 NOTE — PROGRESS NOTES
Rehab Progress Note     Encounter Date: 6/18/2022    CC: Decreased mobility, confusion    Interval Events (Subjective)  Patient seen and examined atbedside with SLP and family. Patient reports he has pain in his mucles located at this thighs. Becomes tearful when talking about pain. Reviewed with wife and patient that this may be related to muccle spasms. Patient describes his pain as pain all over his legs, give the vague nature of his pain we will also check dopplers. Plan to trial restarting baclofen as well.   Patient agreeable to plan     IDT Team Meeting 6/16/2022  DC/Disposition:  6/21/22    Objective:  VITAL SIGNS: BP (!) 145/73   Pulse 61   Temp 36.4 °C (97.5 °F) (Temporal)   Resp 18   Ht 1.829 m (6')   Wt 85.5 kg (188 lb 7.9 oz)   SpO2 93%   BMI 25.56 kg/m²   Gen: NAD, laying comfortably in bed, wife at side   Psych: Mood and affect appropriate, becomes tearful when talking about pain   CV: RRR, no edema  Resp: CTAB, no upper airway sounds  Abd: NTND  Neuro: AOx3, left elbow casted, wiggling fingers  Extremities : no pain with palpation to calf, is generally uncomfortably with palpation to legs all over     Recent Results (from the past 72 hour(s))   POCT glucose device results    Collection Time: 06/15/22  5:10 PM   Result Value Ref Range    POC Glucose, Blood 132 (H) 65 - 99 mg/dL   POCT glucose device results    Collection Time: 06/15/22  8:14 PM   Result Value Ref Range    POC Glucose, Blood 177 (H) 65 - 99 mg/dL   Basic Metabolic Panel    Collection Time: 06/16/22  6:00 AM   Result Value Ref Range    Sodium 137 135 - 145 mmol/L    Potassium 3.7 3.6 - 5.5 mmol/L    Chloride 105 96 - 112 mmol/L    Co2 22 20 - 33 mmol/L    Glucose 133 (H) 65 - 99 mg/dL    Bun 23 (H) 8 - 22 mg/dL    Creatinine 0.79 0.50 - 1.40 mg/dL    Calcium 8.1 (L) 8.5 - 10.5 mg/dL    Anion Gap 10.0 7.0 - 16.0   MAGNESIUM    Collection Time: 06/16/22  6:00 AM   Result Value Ref Range    Magnesium 1.1 (L) 1.5 - 2.5 mg/dL    PHOSPHORUS    Collection Time: 06/16/22  6:00 AM   Result Value Ref Range    Phosphorus 3.4 2.5 - 4.5 mg/dL   ESTIMATED GFR    Collection Time: 06/16/22  6:00 AM   Result Value Ref Range    GFR (CKD-EPI) 93 >60 mL/min/1.73 m 2   POCT glucose device results    Collection Time: 06/16/22  7:39 AM   Result Value Ref Range    POC Glucose, Blood 127 (H) 65 - 99 mg/dL   POCT glucose device results    Collection Time: 06/16/22 11:30 AM   Result Value Ref Range    POC Glucose, Blood 203 (H) 65 - 99 mg/dL   POCT glucose device results    Collection Time: 06/16/22  4:54 PM   Result Value Ref Range    POC Glucose, Blood 178 (H) 65 - 99 mg/dL   POCT glucose device results    Collection Time: 06/16/22  9:16 PM   Result Value Ref Range    POC Glucose, Blood 210 (H) 65 - 99 mg/dL   CBC WITH DIFFERENTIAL    Collection Time: 06/17/22  5:36 AM   Result Value Ref Range    WBC 10.2 4.8 - 10.8 K/uL    RBC 3.88 (L) 4.70 - 6.10 M/uL    Hemoglobin 11.1 (L) 14.0 - 18.0 g/dL    Hematocrit 33.7 (L) 42.0 - 52.0 %    MCV 86.9 81.4 - 97.8 fL    MCH 28.6 27.0 - 33.0 pg    MCHC 32.9 (L) 33.7 - 35.3 g/dL    RDW 44.5 35.9 - 50.0 fL    Platelet Count 363 164 - 446 K/uL    MPV 9.9 9.0 - 12.9 fL    Neutrophils-Polys 76.80 (H) 44.00 - 72.00 %    Lymphocytes 10.30 (L) 22.00 - 41.00 %    Monocytes 7.20 0.00 - 13.40 %    Eosinophils 4.10 0.00 - 6.90 %    Basophils 0.40 0.00 - 1.80 %    Immature Granulocytes 1.20 (H) 0.00 - 0.90 %    Nucleated RBC 0.00 /100 WBC    Neutrophils (Absolute) 7.82 (H) 1.82 - 7.42 K/uL    Lymphs (Absolute) 1.05 1.00 - 4.80 K/uL    Monos (Absolute) 0.73 0.00 - 0.85 K/uL    Eos (Absolute) 0.42 0.00 - 0.51 K/uL    Baso (Absolute) 0.04 0.00 - 0.12 K/uL    Immature Granulocytes (abs) 0.12 (H) 0.00 - 0.11 K/uL    NRBC (Absolute) 0.00 K/uL   Comp Metabolic Panel    Collection Time: 06/17/22  5:36 AM   Result Value Ref Range    Sodium 136 135 - 145 mmol/L    Potassium 3.6 3.6 - 5.5 mmol/L    Chloride 103 96 - 112 mmol/L    Co2 23 20 - 33  mmol/L    Anion Gap 10.0 7.0 - 16.0    Glucose 130 (H) 65 - 99 mg/dL    Bun 20 8 - 22 mg/dL    Creatinine 0.84 0.50 - 1.40 mg/dL    Calcium 8.0 (L) 8.5 - 10.5 mg/dL    AST(SGOT) 23 12 - 45 U/L    ALT(SGPT) 15 2 - 50 U/L    Alkaline Phosphatase 202 (H) 30 - 99 U/L    Total Bilirubin 0.5 0.1 - 1.5 mg/dL    Albumin 2.5 (L) 3.2 - 4.9 g/dL    Total Protein 5.1 (L) 6.0 - 8.2 g/dL    Globulin 2.6 1.9 - 3.5 g/dL    A-G Ratio 1.0 g/dL   ESTIMATED GFR    Collection Time: 06/17/22  5:36 AM   Result Value Ref Range    GFR (CKD-EPI) 91 >60 mL/min/1.73 m 2   MAGNESIUM    Collection Time: 06/17/22  5:36 AM   Result Value Ref Range    Magnesium 1.3 (L) 1.5 - 2.5 mg/dL   PHOSPHORUS    Collection Time: 06/17/22  5:36 AM   Result Value Ref Range    Phosphorus 3.1 2.5 - 4.5 mg/dL   POCT glucose device results    Collection Time: 06/17/22  7:47 AM   Result Value Ref Range    POC Glucose, Blood 116 (H) 65 - 99 mg/dL   POCT glucose device results    Collection Time: 06/17/22 11:31 AM   Result Value Ref Range    POC Glucose, Blood 186 (H) 65 - 99 mg/dL   POCT glucose device results    Collection Time: 06/17/22  5:11 PM   Result Value Ref Range    POC Glucose, Blood 184 (H) 65 - 99 mg/dL   POCT glucose device results    Collection Time: 06/17/22  9:19 PM   Result Value Ref Range    POC Glucose, Blood 183 (H) 65 - 99 mg/dL   POCT glucose device results    Collection Time: 06/18/22  7:49 AM   Result Value Ref Range    POC Glucose, Blood 104 (H) 65 - 99 mg/dL       Current Facility-Administered Medications   Medication Frequency   • baclofen (LIORESAL) tablet 5 mg TID PRN   • QUEtiapine (Seroquel) tablet 50 mg Nightly   • QUEtiapine (Seroquel) tablet 25 mg TID PRN   • magnesium oxide tablet 400 mg BID   • ferrous sulfate tablet 325 mg QDAY with Breakfast   • ascorbic acid (Vitamin C) tablet 500 mg DAILY   • diphenhydrAMINE-zinc acetate (BENADRYL ITCH) topical cream TID PRN   • traMADol (ULTRAM) 50 MG tablet 50 mg Q6HRS PRN   • vancomycin 50  mg/mL oral soln 125 mg Q6HRS   • miconazole 2%-zinc oxide (Vinny) topical cream Q6HRS PRN   • insulin regular (HumuLIN R,NovoLIN R) injection 4X/DAY ACHS    And   • dextrose 50% (D50W) injection 25 g Q15 MIN PRN   • amLODIPine (NORVASC) tablet 10 mg Q DAY   • lidocaine (LIDODERM) 5 % 1 Patch Q24HR   • vitamin D3 (cholecalciferol) tablet 1,000 Units DAILY   • Respiratory Therapy Consult Continuous RT   • hydrALAZINE (APRESOLINE) tablet 25 mg Q8HRS PRN   • acetaminophen (Tylenol) tablet 650 mg Q4HRS PRN   • polyethylene glycol/lytes (MIRALAX) PACKET 1 Packet QDAY PRN    And   • magnesium hydroxide (MILK OF MAGNESIA) suspension 30 mL QDAY PRN    And   • bisacodyl (DULCOLAX) suppository 10 mg QDAY PRN   • omeprazole (PRILOSEC) capsule 20 mg DAILY   • artificial tears ophthalmic solution 1 Drop PRN   • benzocaine-menthol (Cepacol) lozenge 1 Lozenge Q2HRS PRN   • mag hydrox-al hydrox-simeth (MAALOX PLUS ES or MYLANTA DS) suspension 20 mL Q2HRS PRN   • ondansetron (ZOFRAN ODT) dispertab 4 mg 4X/DAY PRN    Or   • ondansetron (ZOFRAN) syringe/vial injection 4 mg 4X/DAY PRN   • traZODone (DESYREL) tablet 50 mg QHS PRN   • sodium chloride (OCEAN) 0.65 % nasal spray 2 Spray PRN   • midazolam (VERSED) 5 mg/mL (1 mL vial) PRN   • acetaminophen (Tylenol) tablet 650 mg Q6HRS PRN   • allopurinol (ZYLOPRIM) tablet 100 mg QDAY   • atorvastatin (LIPITOR) tablet 80 mg Q EVENING   • clopidogrel (PLAVIX) tablet 75 mg QDAY   • FLUoxetine (PROZAC) capsule 40 mg DAILY   • gabapentin (NEURONTIN) capsule 100 mg QHS PRN   • losartan (COZAAR) tablet 50 mg BID   • metoprolol SR (TOPROL XL) tablet 100 mg Q EVENING   • tamsulosin (FLOMAX) capsule 0.4 mg DAILY       Orders Placed This Encounter   Procedures   • Diet Order Diet: Consistent CHO (Diabetic); Second Modifier: (optional): Cardiac     Standing Status:   Standing     Number of Occurrences:   1     Order Specific Question:   Diet:     Answer:   Consistent CHO (Diabetic) [4]     Order Specific  Question:   Second Modifier: (optional)     Answer:   Cardiac [6]       Assessment:  Active Hospital Problems    Diagnosis    • *Acute encephalopathy    • Hyponatremia    • Dehydration    • Essential hypertension, benign    • Benign prostatic hyperplasia with urinary obstruction    • Diarrhea    • GERD with esophagitis    • Recurrent UTI    • Hypomagnesemia    • Type 2 diabetes mellitus, with long-term current use of insulin (HCC)    • Stage 3b chronic kidney disease (HCC)        Medical Decision Making and Plan:  Acute toxic encephalopathy - Patient with UTI with urosepsis s/p IV antibiotics. Patient has urinary retention and requires occasional catheterization putting him at risk for recurrent UTIs. With Decreased cognition, balance and strength in setting of previous CVA  -PT and OT for mobility and ADLs  -SLP for cognition  -Follow-up with [facility]     Sundowning - Patient with confusion at night and early morning. Will start Seroquel at 1900 and PRN TID    Previous R CVA - Patient with contracture and spasticity on R side. On Plavix and statin  -Will start low dose Baclofen although most likely contracture. Serial casting to start 6/8, therapy holds otherwise for GI and leukocytosis. Most likely removal on 6/14. Will discontinue Baclofen as having some more confusion. Removal on 6/14 with improved ROM. Repeat casting 6/17    Leg pain  - generalized pain all over legs, will check dopplers to r/o DVT  - restarted low dose baclofen 5mg TID PRN spasms   - dopplers + for DVT, starting treatment dose xarelto.     HTN/CAD - Patient on Plavix. Patient on Losartan 50 mg BID, Metoprolol 100 mg XL  -Consult Hospitalist. Recommending TTE     HLD - Patient on Atorvastatin 80 mg QHS     Leukocytosis - On treatment for UTI. On Ancef with recommendation to switch to Augment. Will check CBC in AM before switch  -Repeat 16.5 up from 16.1, consult Hospitalist. Improved to 12.7 on 6/5/22. Repeat 6/8 with Leukocytosis 17.9  and diffuse loose stools.   -Check C diff. Check UA. Check Blood cultures. KUB with distention and featureless colon. Discussed with hospitalist and start antibiotics including Flagyl. CT abdomen - possible small left cyst bleeding. Will monitor CBC. On Ceftriaxone. Improving leukocytosis 11.7 on 6/10/22    C Diff positive on 6/8. On Vancomycin PO. Improving. Still having loose BMs on 6/13/22. Per hospitalist continue Vancomycin for 10 days  -No loose BMs in 48 hours. Having incontinence, will pause on discontinue contact precautions    Anemia - Check AM CBC - 12.2 Repeat 11.3, Fe low but with GI issues holding on starting 6/8. Will start now that GI under more control     DM2 with hyperglycemia - Patient on Glargine 11 U and SSI  -Consult hospitalist     Depression - Patient on Fluoxetine 40 mg daily      Hx of Gout - Patient on Allopurinol 100 mg daily     Urinary Retention - Patient requiring catheterization ~1 time daily. Follows with Urology. Continue Flomax  -Hematuria on 6/7 with significant amount. Hold Xarelto. Flush bladder.  Improved hematuria     Vitamin D Deficiency - 29 on admission. Start 1000 U     DVT Ppx - was previously on xarelto, was held on 6/7.      Total time:  30 minutes.  I spent greater than 50% of the time for patient care, counseling, and coordination on this date, including unit/floor time, and face-to-face time with the patient as per interval events and assessment and plan above. Topics discussed included off contact precautions, completes vancomycin this weekend, casting with PT/OT, sundowning and start Seroquel scheduled/PRN.     Michelle Zepeda D.O.

## 2022-06-18 NOTE — PROGRESS NOTES
Pt OOB to wheelchair. Pain significantly worse than prior days. Complains of pain to left thigh and lower back. No redness noted to thigh. Individually, does not appear swollen but comparitively, slightly more edematous than right thigh. Notified Dr. Zepeda. Awaiting orders. Medicated with tylenol and lidocaine patch placed. Will monitor.

## 2022-06-18 NOTE — THERAPY
Speech Language Pathology  Daily Treatment     Patient Name: Av Bob  Age:  77 y.o., Sex:  male  Medical Record #: [id number]  Today's Date: 6/18/2022     Precautions  Precautions: Fall Risk  Comments: L eric from previous CVA poor OOB tolerance, wounds on buttocks, L resting hand splint when resting    Subjective    Pt initially up in WC in room, in pain d/t sore on bottom. Transferred to bed and changed brief d/t BM.      Objective       06/18/22 1301   Treatment Charges   SLP Cognitive Skill Development First 15 Minutes 1   SLP Cognitive Skill Development Additional 15 Minutes 1   SLP Total Time Spent   SLP Individual Total Time Spent (Mins) 30   Cognition   Simple Attention Minimal (4)   Orientation  Minimal (4)   Sleep/Wake Cycle   Sleep & Rest Awake;Out of bed  (In pain, nursing notified.)   Interdisciplinary Plan of Care Collaboration   IDT Collaboration with  Family / Caregiver   Patient Position at End of Therapy In Bed;Call Light within Reach;Family / Friend in Room   Collaboration Comments CLOF       Assessment    Pt obtained 24/30 on O-Log and was using room board with increased independence this date. Pt labile throughout session d/t pain. Pt could not verbally sequence transfer from bed to WC and req'd total A.     Strengths: Supportive family  Barriers: Impaired functional cognition, Impaired activity tolerance, Impaired carryover of learning    Plan    Continue to address memory/recall, safety transfer sequences.    Passport items to be completed:  Express basic needs, understand food/liquid recommendations, consistently follow swallow precautions, manage finances, manage medications, arrive to therapy appointments on time, complete daily memory log entries, solve problems related to safety situations, review education related to hospitalization, complete caregiver training     Speech Therapy Problems (Active)       Problem: Memory STGs       Dates: Start: 06/01/22         Goal:  STG-Within one week, patient will remember safety precautions related to hospitalization with 75% accuracy.        Dates: Start: 06/01/22         Goal Note filed on 06/08/22 1452 by Harpal Williamson MS,CCC-SLP       Mod-max A for pt to recall new information related to his current hospitalization                  Problem: Problem Solving STGs       Dates: Start: 06/01/22         Goal: STG-Within one week, patient will complete o-log with a final score of 25/30 over three consecutive days       Dates: Start: 06/01/22         Goal Note filed on 06/08/22 1452 by Harpal Williamson MS,CCC-SLP       Pt's highest O-log score to date was 23/30                  Problem: Speech/Swallowing LTGs       Dates: Start: 06/01/22         Goal: LTG-By discharge, patient will solve basic problems in order to d/c home with SPV       Dates: Start: 06/01/22

## 2022-06-18 NOTE — PROGRESS NOTES
[facility] Medicine Daily Progress Note    Date of Service  6/18/2022    Chief Complaint:  Hypertension  Diabetes  Leukocytosis    Interval History:  Having a lot of right leg pain today -- Physiatrist aware.    Review of Systems  Review of Systems   Constitutional: Negative for fever.   Eyes: Negative for blurred vision.   Respiratory: Negative for shortness of breath.    Cardiovascular: Negative for palpitations.   Gastrointestinal: Negative for nausea and vomiting.   Neurological: Negative for dizziness and headaches.   Psychiatric/Behavioral: Negative for hallucinations.        Physical Exam  Temp:  [36.4 °C (97.5 °F)-36.7 °C (98.1 °F)] 36.4 °C (97.5 °F)  Pulse:  [53-63] 61  Resp:  [18] 18  BP: (112-145)/(57-73) 145/73  SpO2:  [91 %-98 %] 93 %    Physical Exam  Vitals and nursing note reviewed.   Constitutional:       General: He is not in acute distress.  HENT:      Mouth/Throat:      Mouth: Mucous membranes are moist.      Pharynx: Oropharynx is clear.   Eyes:      General: No scleral icterus.  Cardiovascular:      Rate and Rhythm: Normal rate and regular rhythm.   Pulmonary:      Effort: Pulmonary effort is normal.      Breath sounds: No wheezing or rales.   Abdominal:      General: Bowel sounds are normal.      Palpations: Abdomen is soft.   Musculoskeletal:      Cervical back: No rigidity.      Right lower leg: No edema.      Left lower leg: No edema.   Skin:     General: Skin is warm and dry.   Neurological:      Mental Status: He is alert and oriented to person, place, and time.   Psychiatric:         Mood and Affect: Mood normal.         Behavior: Behavior normal.         Fluids    Intake/Output Summary (Last 24 hours) at 6/18/2022 0944  Last data filed at 6/18/2022 0900  Gross per 24 hour   Intake 700 ml   Output --   Net 700 ml       Laboratory  Recent Labs     06/17/22  0536   WBC 10.2   RBC 3.88*   HEMOGLOBIN 11.1*   HEMATOCRIT 33.7*   MCV 86.9   MCH 28.6   MCHC 32.9*   RDW 44.5   PLATELETCT 363   MPV  9.9     Recent Labs     22  0600 22  0536   SODIUM 137 136   POTASSIUM 3.7 3.6   CHLORIDE 105 103   CO2 22 23   GLUCOSE 133* 130*   BUN 23* 20   CREATININE 0.79 0.84   CALCIUM 8.1* 8.0*                   Imaging    Assessment/Plan  Gout  Assessment & Plan  On Allopurinol    Hypokalemia  Assessment & Plan  K+: 3.6 ()  Off supplements  Note: pt has a hx of hypo-K+ (unknown reason) and takes supplements at home  Monitor    C. difficile colitis  Assessment & Plan  C. Diff (+)  S/P leukocytosis  S/P diarrhea   On oral Vanco (thru )    Vitamin D insufficiency  Assessment & Plan  Vit D: 29  On supplements    Azotemia  Assessment & Plan  Resolved  Bun: 28 --> 23 () --> 20 ()  Encouraging fluid intake  Monitor    Essential hypertension, benign- (present on admission)  Assessment & Plan  BP ok  On Norvasc  On Cozaar  On Toprol XL  Note: on Flomax  Cont to monitor    Benign prostatic hyperplasia with urinary obstruction- (present on admission)  Assessment & Plan  S/P TURP in 2022 w/ Dr. Barry  Gross hematuria resolved w/ CBI x 24 hours on 22  Pt's wife reports that Urology told her to expect intermittent hematuria for months following TURP  But suspect bleeding may have been from left kidney hemorrhage (see CT Abd/Pelvis 22)  Needs  F/U    Normocytic anemia- (present on admission)  Assessment & Plan  Hb: 10.4  Fe 20, sats 13%  On Fe supplements    GERD with esophagitis- (present on admission)  Assessment & Plan  On Prilosec    Recurrent UTI- (present on admission)  Assessment & Plan  Has hx of recurrent UTI  Was adm to [facility] [place] for UTI, diarrhea, and confusion  S/P recent UTI, s/p abx  Note: pt self caths at home    Depression  Assessment & Plan  On Prozac    Hypomagnesemia- (present on admission)  Assessment & Plan  M.3 () --> 1.1 () --> 1.3 ()  S/P IV Mg 2g x 1 dose ( x 2 runs)  On oral supplements  Note: pt has a hx of hypo-mg (unknown reason) and takes supplements at  home  Monitor    H/O Cerebrovascular accident (CVA) in adulthood- (present on admission)  Assessment & Plan  On Plavix and Lipitor    Type 2 diabetes mellitus, with long-term current use of insulin (HCC)- (present on admission)  Assessment & Plan  Hba1c: 6.6 (6/1)  BS: 104-186  Off Glargine (had recent lower BS)  Managing with SS coverage or now  Note: home meds include Trulicity 3 mg weekly, Toujeo 5-12 units daily, Humalog 5 units base and per SS  Cont to monitor

## 2022-06-19 PROBLEM — I82.401 ACUTE DEEP VEIN THROMBOSIS (DVT) OF RIGHT LOWER EXTREMITY (HCC): Status: ACTIVE | Noted: 2022-06-19

## 2022-06-19 LAB
ANION GAP SERPL CALC-SCNC: 10 MMOL/L (ref 7–16)
BUN SERPL-MCNC: 23 MG/DL (ref 8–22)
CALCIUM SERPL-MCNC: 7.9 MG/DL (ref 8.5–10.5)
CHLORIDE SERPL-SCNC: 106 MMOL/L (ref 96–112)
CO2 SERPL-SCNC: 22 MMOL/L (ref 20–33)
CREAT SERPL-MCNC: 0.85 MG/DL (ref 0.5–1.4)
GFR SERPLBLD CREATININE-BSD FMLA CKD-EPI: 90 ML/MIN/1.73 M 2
GLUCOSE BLD STRIP.AUTO-MCNC: 102 MG/DL (ref 65–99)
GLUCOSE BLD STRIP.AUTO-MCNC: 189 MG/DL (ref 65–99)
GLUCOSE BLD STRIP.AUTO-MCNC: 195 MG/DL (ref 65–99)
GLUCOSE BLD STRIP.AUTO-MCNC: 218 MG/DL (ref 65–99)
GLUCOSE SERPL-MCNC: 103 MG/DL (ref 65–99)
MAGNESIUM SERPL-MCNC: 1.2 MG/DL (ref 1.5–2.5)
PHOSPHATE SERPL-MCNC: 3.6 MG/DL (ref 2.5–4.5)
POTASSIUM SERPL-SCNC: 3.6 MMOL/L (ref 3.6–5.5)
SODIUM SERPL-SCNC: 138 MMOL/L (ref 135–145)

## 2022-06-19 PROCEDURE — A9270 NON-COVERED ITEM OR SERVICE: HCPCS | Performed by: PHYSICAL MEDICINE & REHABILITATION

## 2022-06-19 PROCEDURE — 700111 HCHG RX REV CODE 636 W/ 250 OVERRIDE (IP): Performed by: HOSPITALIST

## 2022-06-19 PROCEDURE — 700102 HCHG RX REV CODE 250 W/ 637 OVERRIDE(OP): Performed by: HOSPITALIST

## 2022-06-19 PROCEDURE — 700102 HCHG RX REV CODE 250 W/ 637 OVERRIDE(OP): Performed by: PHYSICAL MEDICINE & REHABILITATION

## 2022-06-19 PROCEDURE — A9270 NON-COVERED ITEM OR SERVICE: HCPCS | Performed by: HOSPITALIST

## 2022-06-19 PROCEDURE — 97129 THER IVNTJ 1ST 15 MIN: CPT

## 2022-06-19 PROCEDURE — 99232 SBSQ HOSP IP/OBS MODERATE 35: CPT | Performed by: HOSPITALIST

## 2022-06-19 PROCEDURE — 83735 ASSAY OF MAGNESIUM: CPT

## 2022-06-19 PROCEDURE — 97535 SELF CARE MNGMENT TRAINING: CPT

## 2022-06-19 PROCEDURE — 770010 HCHG ROOM/CARE - REHAB SEMI PRIVAT*

## 2022-06-19 PROCEDURE — 700101 HCHG RX REV CODE 250: Performed by: PHYSICAL MEDICINE & REHABILITATION

## 2022-06-19 PROCEDURE — 97130 THER IVNTJ EA ADDL 15 MIN: CPT

## 2022-06-19 PROCEDURE — 80048 BASIC METABOLIC PNL TOTAL CA: CPT

## 2022-06-19 PROCEDURE — 84100 ASSAY OF PHOSPHORUS: CPT

## 2022-06-19 PROCEDURE — 97530 THERAPEUTIC ACTIVITIES: CPT

## 2022-06-19 PROCEDURE — 36415 COLL VENOUS BLD VENIPUNCTURE: CPT

## 2022-06-19 PROCEDURE — 82962 GLUCOSE BLOOD TEST: CPT | Mod: 91

## 2022-06-19 PROCEDURE — 99233 SBSQ HOSP IP/OBS HIGH 50: CPT | Performed by: PHYSICAL MEDICINE & REHABILITATION

## 2022-06-19 RX ORDER — MAGNESIUM SULFATE HEPTAHYDRATE 40 MG/ML
2 INJECTION, SOLUTION INTRAVENOUS ONCE
Status: COMPLETED | OUTPATIENT
Start: 2022-06-19 | End: 2022-06-19

## 2022-06-19 RX ADMIN — CLOPIDOGREL 75 MG: 75 TABLET, FILM COATED ORAL at 06:09

## 2022-06-19 RX ADMIN — FLUOXETINE 40 MG: 20 CAPSULE ORAL at 09:02

## 2022-06-19 RX ADMIN — METOPROLOL SUCCINATE 100 MG: 100 TABLET, FILM COATED, EXTENDED RELEASE ORAL at 21:05

## 2022-06-19 RX ADMIN — RIVAROXABAN 15 MG: 15 TABLET, FILM COATED ORAL at 17:32

## 2022-06-19 RX ADMIN — TAMSULOSIN HYDROCHLORIDE 0.4 MG: 0.4 CAPSULE ORAL at 09:02

## 2022-06-19 RX ADMIN — Medication 1000 UNITS: at 09:02

## 2022-06-19 RX ADMIN — TRAMADOL HYDROCHLORIDE 50 MG: 50 TABLET, COATED ORAL at 14:46

## 2022-06-19 RX ADMIN — RIVAROXABAN 15 MG: 15 TABLET, FILM COATED ORAL at 09:02

## 2022-06-19 RX ADMIN — MAGNESIUM OXIDE TAB 400 MG (241.3 MG ELEMENTAL MG) 400 MG: 400 (241.3 MG) TAB at 21:04

## 2022-06-19 RX ADMIN — QUETIAPINE FUMARATE 50 MG: 25 TABLET ORAL at 18:11

## 2022-06-19 RX ADMIN — MAGNESIUM SULFATE HEPTAHYDRATE 2 G: 2 INJECTION, SOLUTION INTRAVENOUS at 11:48

## 2022-06-19 RX ADMIN — TRAMADOL HYDROCHLORIDE 50 MG: 50 TABLET, COATED ORAL at 02:02

## 2022-06-19 RX ADMIN — LOSARTAN POTASSIUM 50 MG: 25 TABLET, FILM COATED ORAL at 09:02

## 2022-06-19 RX ADMIN — VANCOMYCIN HYDROCHLORIDE 125 MG: KIT ORAL at 06:09

## 2022-06-19 RX ADMIN — MAGNESIUM OXIDE TAB 400 MG (241.3 MG ELEMENTAL MG) 400 MG: 400 (241.3 MG) TAB at 09:02

## 2022-06-19 RX ADMIN — OMEPRAZOLE 20 MG: 20 CAPSULE, DELAYED RELEASE ORAL at 09:02

## 2022-06-19 RX ADMIN — ACETAMINOPHEN 650 MG: 325 TABLET ORAL at 21:04

## 2022-06-19 RX ADMIN — AMLODIPINE BESYLATE 10 MG: 5 TABLET ORAL at 06:09

## 2022-06-19 RX ADMIN — OXYCODONE HYDROCHLORIDE AND ACETAMINOPHEN 500 MG: 500 TABLET ORAL at 09:02

## 2022-06-19 RX ADMIN — LIDOCAINE 1 PATCH: 50 PATCH TOPICAL at 13:23

## 2022-06-19 RX ADMIN — ATORVASTATIN CALCIUM 80 MG: 40 TABLET, FILM COATED ORAL at 21:04

## 2022-06-19 RX ADMIN — ALLOPURINOL 100 MG: 100 TABLET ORAL at 06:09

## 2022-06-19 RX ADMIN — FERROUS SULFATE TAB 325 MG (65 MG ELEMENTAL FE) 325 MG: 325 (65 FE) TAB at 09:01

## 2022-06-19 ASSESSMENT — ENCOUNTER SYMPTOMS
COUGH: 0
FEVER: 0
NERVOUS/ANXIOUS: 0
DIZZINESS: 0
DIARRHEA: 0
BLURRED VISION: 0

## 2022-06-19 ASSESSMENT — PATIENT HEALTH QUESTIONNAIRE - PHQ9
1. LITTLE INTEREST OR PLEASURE IN DOING THINGS: NOT AT ALL
SUM OF ALL RESPONSES TO PHQ9 QUESTIONS 1 AND 2: 0
1. LITTLE INTEREST OR PLEASURE IN DOING THINGS: NOT AT ALL
SUM OF ALL RESPONSES TO PHQ9 QUESTIONS 1 AND 2: 0
2. FEELING DOWN, DEPRESSED, IRRITABLE, OR HOPELESS: NOT AT ALL
2. FEELING DOWN, DEPRESSED, IRRITABLE, OR HOPELESS: NOT AT ALL

## 2022-06-19 ASSESSMENT — PAIN SCALES - WONG BAKER
WONGBAKER_NUMERICALRESPONSE: DOESN'T HURT AT ALL
WONGBAKER_NUMERICALRESPONSE: DOESN'T HURT AT ALL

## 2022-06-19 ASSESSMENT — PAIN DESCRIPTION - PAIN TYPE
TYPE: ACUTE PAIN
TYPE: ACUTE PAIN

## 2022-06-19 ASSESSMENT — FIBROSIS 4 INDEX: FIB4 SCORE: 1.23

## 2022-06-19 ASSESSMENT — ACTIVITIES OF DAILY LIVING (ADL)
BED_CHAIR_WHEELCHAIR_TRANSFER_DESCRIPTION: SQUAT PIVOT TRANSFER TO WHEELCHAIR;SET-UP OF EQUIPMENT;SUPERVISION FOR SAFETY;VERBAL CUEING

## 2022-06-19 NOTE — THERAPY
Speech Language Pathology  Daily Treatment     Patient Name: Av Bob  Age:  77 y.o., Sex:  male  Medical Record #: [id number]  Today's Date: 6/19/2022     Precautions  Precautions: Fall Risk  Comments: L LE DVT; L eric from previous CVA poor OOB tolerance, wounds on buttocks, L resting hand splint when resting    Subjective    Pt pleasant and cooperative during ST session. Complaining of pain in R arm, nursing notified by spouse.      Objective       06/19/22 1031   Treatment Charges   SLP Cognitive Skill Development First 15 Minutes 1   SLP Cognitive Skill Development Additional 15 Minutes 1   SLP Total Time Spent   SLP Individual Total Time Spent (Mins) 30   Cognition   Simple Attention Minimal (4)   Orientation  Minimal (4)   Insight into Deficits Moderate (3)   Sleep/Wake Cycle   Sleep & Rest Awake   Interdisciplinary Plan of Care Collaboration   IDT Collaboration with  Family / Caregiver   Patient Position at End of Therapy In Bed;Call Light within Reach;Tray Table within Reach;Phone within Reach;Family / Friend in Room   Collaboration Comments CLOF       Assessment    Pt obtained 25/30 on O-Log, increase of one point from yesterday's session. Pt req'd MAX verbal cues provided by SLP and spouse to be oriented to plan with R arm casting removal, which should occur tomorrow 6/20/2022. MIN cues to attend to task throughout session. Pt demonstrating impaired insight to deficits, especially r/t impaired mobility.     Strengths: Supportive family  Barriers: Impaired functional cognition, Impaired activity tolerance, Impaired carryover of learning    Plan    Continue to address safety problem solving, insight, orientation.     Passport items to be completed:  Express basic needs, understand food/liquid recommendations, consistently follow swallow precautions, manage finances, manage medications, arrive to therapy appointments on time, complete daily memory log entries, solve problems related to safety  situations, review education related to hospitalization, complete caregiver training     Speech Therapy Problems (Active)       Problem: Memory STGs       Dates: Start: 06/01/22         Goal: STG-Within one week, patient will remember safety precautions related to hospitalization with 75% accuracy.        Dates: Start: 06/01/22         Goal Note filed on 06/08/22 1452 by Harpal Williamson MS,CCC-SLP       Mod-max A for pt to recall new information related to his current hospitalization                  Problem: Problem Solving STGs       Dates: Start: 06/01/22         Goal: STG-Within one week, patient will complete o-log with a final score of 25/30 over three consecutive days       Dates: Start: 06/01/22         Goal Note filed on 06/08/22 1452 by Harpal Williamson MS,CCC-SLP       Pt's highest O-log score to date was 23/30                  Problem: Speech/Swallowing LTGs       Dates: Start: 06/01/22         Goal: LTG-By discharge, patient will solve basic problems in order to d/c home with SPV       Dates: Start: 06/01/22

## 2022-06-19 NOTE — PROGRESS NOTES
Patient is agitated and screaming help crying. When approached by staff he said the skin inside the cast is itchy. Surrounding areas of the arm has good capillary refill. He gets frustrated wanted to call the , the PT or the doctor and switching statements from wanting to take the cast off to wanting to go home. He wanted to do it as soon as possible. Patient redirected and was given his scheduled seroquel but does not pacify patient. Itch cream was applied to surrounding areas of the cast to promote comfort but patient still crying and screaming. Trazodone was offered and agreed. Trazodone was given and educated that he will be sleepy. Lights turned off to promote sleep. Charge nurse informed. Will continue to monitor.

## 2022-06-19 NOTE — PROGRESS NOTES
[facility] Medicine Daily Progress Note    Date of Service  6/19/2022    Chief Complaint:  Hypertension  Diabetes  Leukocytosis    Interval History:  Discussed with pt and wife about the US of the leg showing DVT and the treatment.  Will watch for any signs of abnormal bleeding.    Review of Systems  Review of Systems   Constitutional: Negative for fever.   Eyes: Negative for blurred vision.   Respiratory: Negative for cough.    Cardiovascular: Negative for chest pain.   Gastrointestinal: Negative for diarrhea.   Musculoskeletal: Negative for joint pain.   Neurological: Negative for dizziness.   Psychiatric/Behavioral: The patient is not nervous/anxious.         Physical Exam  Temp:  [36.6 °C (97.8 °F)-36.9 °C (98.4 °F)] 36.6 °C (97.8 °F)  Pulse:  [64-65] 65  Resp:  [18] 18  BP: (115-143)/(59-80) 143/80  SpO2:  [93 %-95 %] 93 %    Physical Exam  Vitals and nursing note reviewed.   Constitutional:       Appearance: He is not diaphoretic.   HENT:      Mouth/Throat:      Pharynx: No oropharyngeal exudate or posterior oropharyngeal erythema.   Eyes:      Extraocular Movements: Extraocular movements intact.   Neck:      Vascular: No carotid bruit.   Cardiovascular:      Rate and Rhythm: Normal rate and regular rhythm.   Pulmonary:      Effort: Pulmonary effort is normal.      Breath sounds: No wheezing or rales.   Abdominal:      General: There is no distension.      Palpations: Abdomen is soft.      Tenderness: There is no abdominal tenderness.   Musculoskeletal:      Right lower leg: No edema.      Left lower leg: No edema.   Skin:     General: Skin is warm and dry.   Neurological:      Mental Status: He is alert and oriented to person, place, and time.   Psychiatric:         Mood and Affect: Mood normal.         Behavior: Behavior normal.         Fluids    Intake/Output Summary (Last 24 hours) at 6/19/2022 1023  Last data filed at 6/19/2022 0900  Gross per 24 hour   Intake 460 ml   Output --   Net 460 ml        Laboratory  Recent Labs     06/17/22  0536   WBC 10.2   RBC 3.88*   HEMOGLOBIN 11.1*   HEMATOCRIT 33.7*   MCV 86.9   MCH 28.6   MCHC 32.9*   RDW 44.5   PLATELETCT 363   MPV 9.9     Recent Labs     06/17/22  0536 06/19/22  0549   SODIUM 136 138   POTASSIUM 3.6 3.6   CHLORIDE 103 106   CO2 23 22   GLUCOSE 130* 103*   BUN 20 23*   CREATININE 0.84 0.85   CALCIUM 8.0* 7.9*                   Imaging    Assessment/Plan  Acute deep vein thrombosis (DVT) of right lower extremity (HCC)  Assessment & Plan  US (6/18): acute appearing right posterior tibial vein DVT                   subacute appearing left common femoral vein DVT and proximal femoral  vein junction DVT  On Xarelto    Gout  Assessment & Plan  On Allopurinol    Hypokalemia  Assessment & Plan  K+: 3.6 (6/19)  Off supplements  Note: pt has a hx of hypo-K+ (unknown reason) and takes supplements at home  Monitor    C. difficile colitis  Assessment & Plan  C. Diff (+)  S/P leukocytosis  S/P diarrhea   On oral Vanco (thru 6/19)    Vitamin D insufficiency  Assessment & Plan  Vit D: 29  On supplements    Azotemia  Assessment & Plan  Bun: 23 (6/16) --> 20 (6/17) --> 23 (6/19)  Encouraging fluid intake  Monitor    Essential hypertension, benign- (present on admission)  Assessment & Plan  BP ok  On Norvasc  On Cozaar  On Toprol XL  Note: on Flomax  Cont to monitor    Benign prostatic hyperplasia with urinary obstruction- (present on admission)  Assessment & Plan  S/P TURP in March 2022 w/ Dr. Barry  Gross hematuria resolved w/ CBI x 24 hours on 6/8/22  Pt's wife reports that Urology told her to expect intermittent hematuria for months following TURP  But suspect bleeding may have been from left kidney hemorrhage (see CT Abd/Pelvis 6/8/22)  Needs  F/U    Normocytic anemia- (present on admission)  Assessment & Plan  Hb: 10.4  Fe 20, sats 13%  On Fe supplements    GERD with esophagitis- (present on admission)  Assessment & Plan  On Prilosec    Recurrent UTI- (present on  admission)  Assessment & Plan  Has hx of recurrent UTI  Was adm to [facility] for UTI, diarrhea, and confusion  S/P recent UTI, s/p abx  Note: pt self caths at home    Depression  Assessment & Plan  On Prozac    Hypomagnesemia- (present on admission)  Assessment & Plan  M.3 () --> 1.1 () --> 1.3 () --> 1.2 ()  S/P IV Mg 2g x 1 dose ( x 2 runs)  Will give another IV Mg ()  On oral supplements  Note: pt has a hx of hypo-mg (unknown reason) and takes supplements at home  Monitor    H/O Cerebrovascular accident (CVA) in adulthood- (present on admission)  Assessment & Plan  On Plavix and Lipitor    Type 2 diabetes mellitus, with long-term current use of insulin (HCC)- (present on admission)  Assessment & Plan  Hba1c: 6.6 ()  BS: 102-188  Off Glargine (had recent lower BS)  Managing with SS coverage or now  Note: home meds include Trulicity 3 mg weekly, Toujeo 5-12 units daily, Humalog 5 units base and per SS  Cont to monitor

## 2022-06-19 NOTE — CARE PLAN
The patient is Stable - Low risk of patient condition declining or worsening    Shift Goals  Clinical Goals: Glucose management  Patient Goals:   Progress made toward(s) clinical / shift goals:    Problem: Diabetes Management  Goal: Patient's ability to maintain appropriate glucose levels will be maintained or improve  Outcome: Progressing   Patient on FSBG  Ac/hs. Mantaned glucose level between defined ranges. Educated in s/s of hyper/hipoglycemia and diabetic diet.

## 2022-06-19 NOTE — THERAPY
Occupational Therapy  Daily Treatment     Patient Name: Av Bob  Age:  77 y.o., Sex:  male  Medical Record #: [id number]  Today's Date: 6/19/2022     Precautions  Precautions: (P) Fall Risk  Comments: (P) L eric from previous CVA poor OOB tolerance, wounds on buttocks, L resting hand splint when resting    Safety   ADL Safety : Requires Physical Assist for Safety (2 person)    Subjective    Patient in bed and just finished breakfast.  Stated he didn't care about whether he got out of bed or not.  Wants to d/c home today.  Eventually agreeable to dressing, out of bed and grooming.     Objective       06/19/22 0901   OT Charge Group   OT Self Care / ADL 1   OT Therapy Activity 1   OT Total Time Spent   OT Individual Total Time Spent (Mins) 30   Precautions   Precautions Fall Risk   Comments L eric from previous CVA poor OOB tolerance, wounds on buttocks, L resting hand splint when resting   Pain 0 - 10 Group   Location Elbow;Buttock   Location Orientation Left   Therapist Pain Assessment Prior to Activity;During Activity   Functional Level of Assist   Grooming Standby Assist;Seated   Grooming Description Seated in wheelchair at sink;Set-up of equipment;Supervision for safety  (brush teeth, shave w/ electric razor and washed face with setup)   Lower Body Dressing Total Assist   Lower Body Dressing Description   (assist to don sweatpants 3/3 tasks)   Bed, Chair, Wheelchair Transfer Maximal Assist   Bed Chair Wheelchair Transfer Description Squat pivot transfer to wheelchair;Set-up of equipment;Supervision for safety;Verbal cueing  (squat pivot to the left max A due to patient pushing w/c away when attempting to go towards the right (strong side))   Bed Mobility    Supine to Sit Maximal Assist   Scooting Maximal Assist   Skilled Intervention Tactile Cuing;Verbal Cuing;Sequencing   Interdisciplinary Plan of Care Collaboration   IDT Collaboration with  Physician   Patient Position at End of Therapy  Seated;Chair Alarm On;Self Releasing Lap Belt Applied;Call Light within Reach;Tray Table within Reach;Phone within Reach   Collaboration Comments Dr Zepeda arrived to talk with patient at end of session       Assessment    Patient required total assist to don sweatpants in bed.  Attempted squat pivot to the right from bed to w/c, but patient was pushing w/c away from bed with R UE.  Therefore transferred squat pivot to the left with much better results, though max A. Patient with newly diagnosed L LE DVT.  Strengths: Motivated for self care and independence, Pleasant and cooperative, Supportive family, Willingly participates in therapeutic activities  Barriers: Confused, Decreased endurance, Fatigue, Generalized weakness, Impaired activity tolerance, Impaired balance, Impaired functional cognition, Limited mobility    Plan    Monitor LUE (skin/circulation, comfort) -2 serial cast placed on 6/17 to be removed on 6/20  Trial compression glove for L hand swelling, adjust resting hand splint as needed     Pressure sore prevention/mgmt, Attention to task, ADLs, rolling, 1 step directions, anterior weight shift L and R for progression of functional transfers, seated balance, STS    Occupational Therapy Goals (Active)       Problem: Bathing       Dates: Start: 06/01/22         Goal: STG-Within one week, patient will bathe w/ max A using DME as needed.        Dates: Start: 06/01/22         Goal Note filed on 06/15/22 1550 by Yara Aleman, YANETH       Pt cont to require Total x2 for shower                 Problem: Dressing       Dates: Start: 06/01/22         Goal: STG-Within one week, patient will dress LB w/ mod A.        Dates: Start: 06/01/22         Goal Note filed on 06/15/22 1550 by Yara Aleman, OT       Pt cont to require Total x2 for LB dressing                 Problem: Functional Transfers       Dates: Start: 06/01/22         Goal: STG-Within one week, patient will transfer to toilet w/ max A using DME as  needed.       Dates: Start: 06/01/22         Goal Note filed on 06/15/22 1550 by Yara Aleman OT       Pt cont to require Total x2 for toilet transfer              Goal: STG-Within one week, patient will transfer to step in shower with max A using DME as needed.       Dates: Start: 06/01/22         Goal Note filed on 06/15/22 1550 by Yara Aleman OT       Pt cont to require Total x2 for shower transfer                 Problem: OT Long Term Goals       Dates: Start: 06/01/22         Goal: LTG-By discharge, patient will complete basic self care tasks with min A using DME and AE as needed.       Dates: Start: 06/01/22            Goal: LTG-By discharge, patient will perform bathroom transfers w/ min A using DME and AE as needed.       Dates: Start: 06/01/22               Problem: Toileting       Dates: Start: 06/01/22         Goal: STG-Within one week, patient will complete toileting tasks w/ max A using DME as needed.        Dates: Start: 06/01/22         Goal Note filed on 06/15/22 1550 by Yara Aleman OT       Pt cont to require Total x2 for toileting due to incontinence

## 2022-06-19 NOTE — ASSESSMENT & PLAN NOTE
US (6/18): acute appearing right posterior tibial vein DVT                   subacute appearing left common femoral vein DVT and proximal femoral  vein junction DVT  On Xarelto

## 2022-06-19 NOTE — PROGRESS NOTES
Rehab Progress Note     Encounter Date: 6/19/2022    CC: Decreased mobility, confusion    Interval Events (Subjective)  Patient seen and examined and working with OT. Patient reports his arm is itchy under his cast. Otherwise denies leg pain today. Denies HA, lightheadedness, or dizziness. Mild hematuria in urine this morning after starting treatment dose xarelto for acute DVTs. No reported events over night, patient toleating therapy well today; improved from yesterday.     Does not report HA, lightheadedness, SOB, CP, abdominal pain, or changes in numbness/tingling/weakness.       IDT Team Meeting 6/16/2022  DC/Disposition:  6/21/22    Objective:  VITAL SIGNS: BP (!) 143/80   Pulse 65   Temp 36.6 °C (97.8 °F) (Temporal)   Resp 18   Ht 1.829 m (6')   Wt 85.5 kg (188 lb 7.9 oz)   SpO2 93%   BMI 25.56 kg/m²   Gen: NAD, seated in MWC, therapy at side   Psych: Mood and affect appropriate, is not tearful today   CV: RRR, no edema  Resp: CTAB, no upper airway sounds  Abd: NTND  Neuro: AOx3, left elbow casted, wiggling fingers  Extremities : no pain with palpation to calf, or thighs today   + cast in place on LUE       Images:  Vascular Laboratory 6/118 Dopplers    CONCLUSIONS   1.  Acute appearing RIGHT posterior tibial vein DVT.   2.  Subacute appearing LEFT common femoral vein DVT and proximal femoral    vein junction DVT.   Recent Results (from the past 72 hour(s))   POCT glucose device results    Collection Time: 06/16/22 11:30 AM   Result Value Ref Range    POC Glucose, Blood 203 (H) 65 - 99 mg/dL   POCT glucose device results    Collection Time: 06/16/22  4:54 PM   Result Value Ref Range    POC Glucose, Blood 178 (H) 65 - 99 mg/dL   POCT glucose device results    Collection Time: 06/16/22  9:16 PM   Result Value Ref Range    POC Glucose, Blood 210 (H) 65 - 99 mg/dL   CBC WITH DIFFERENTIAL    Collection Time: 06/17/22  5:36 AM   Result Value Ref Range    WBC 10.2 4.8 - 10.8 K/uL    RBC 3.88 (L) 4.70 - 6.10  M/uL    Hemoglobin 11.1 (L) 14.0 - 18.0 g/dL    Hematocrit 33.7 (L) 42.0 - 52.0 %    MCV 86.9 81.4 - 97.8 fL    MCH 28.6 27.0 - 33.0 pg    MCHC 32.9 (L) 33.7 - 35.3 g/dL    RDW 44.5 35.9 - 50.0 fL    Platelet Count 363 164 - 446 K/uL    MPV 9.9 9.0 - 12.9 fL    Neutrophils-Polys 76.80 (H) 44.00 - 72.00 %    Lymphocytes 10.30 (L) 22.00 - 41.00 %    Monocytes 7.20 0.00 - 13.40 %    Eosinophils 4.10 0.00 - 6.90 %    Basophils 0.40 0.00 - 1.80 %    Immature Granulocytes 1.20 (H) 0.00 - 0.90 %    Nucleated RBC 0.00 /100 WBC    Neutrophils (Absolute) 7.82 (H) 1.82 - 7.42 K/uL    Lymphs (Absolute) 1.05 1.00 - 4.80 K/uL    Monos (Absolute) 0.73 0.00 - 0.85 K/uL    Eos (Absolute) 0.42 0.00 - 0.51 K/uL    Baso (Absolute) 0.04 0.00 - 0.12 K/uL    Immature Granulocytes (abs) 0.12 (H) 0.00 - 0.11 K/uL    NRBC (Absolute) 0.00 K/uL   Comp Metabolic Panel    Collection Time: 06/17/22  5:36 AM   Result Value Ref Range    Sodium 136 135 - 145 mmol/L    Potassium 3.6 3.6 - 5.5 mmol/L    Chloride 103 96 - 112 mmol/L    Co2 23 20 - 33 mmol/L    Anion Gap 10.0 7.0 - 16.0    Glucose 130 (H) 65 - 99 mg/dL    Bun 20 8 - 22 mg/dL    Creatinine 0.84 0.50 - 1.40 mg/dL    Calcium 8.0 (L) 8.5 - 10.5 mg/dL    AST(SGOT) 23 12 - 45 U/L    ALT(SGPT) 15 2 - 50 U/L    Alkaline Phosphatase 202 (H) 30 - 99 U/L    Total Bilirubin 0.5 0.1 - 1.5 mg/dL    Albumin 2.5 (L) 3.2 - 4.9 g/dL    Total Protein 5.1 (L) 6.0 - 8.2 g/dL    Globulin 2.6 1.9 - 3.5 g/dL    A-G Ratio 1.0 g/dL   ESTIMATED GFR    Collection Time: 06/17/22  5:36 AM   Result Value Ref Range    GFR (CKD-EPI) 91 >60 mL/min/1.73 m 2   MAGNESIUM    Collection Time: 06/17/22  5:36 AM   Result Value Ref Range    Magnesium 1.3 (L) 1.5 - 2.5 mg/dL   PHOSPHORUS    Collection Time: 06/17/22  5:36 AM   Result Value Ref Range    Phosphorus 3.1 2.5 - 4.5 mg/dL   POCT glucose device results    Collection Time: 06/17/22  7:47 AM   Result Value Ref Range    POC Glucose, Blood 116 (H) 65 - 99 mg/dL   POCT  glucose device results    Collection Time: 06/17/22 11:31 AM   Result Value Ref Range    POC Glucose, Blood 186 (H) 65 - 99 mg/dL   POCT glucose device results    Collection Time: 06/17/22  5:11 PM   Result Value Ref Range    POC Glucose, Blood 184 (H) 65 - 99 mg/dL   POCT glucose device results    Collection Time: 06/17/22  9:19 PM   Result Value Ref Range    POC Glucose, Blood 183 (H) 65 - 99 mg/dL   POCT glucose device results    Collection Time: 06/18/22  7:49 AM   Result Value Ref Range    POC Glucose, Blood 104 (H) 65 - 99 mg/dL   POCT glucose device results    Collection Time: 06/18/22 10:59 AM   Result Value Ref Range    POC Glucose, Blood 181 (H) 65 - 99 mg/dL   POCT glucose device results    Collection Time: 06/18/22  5:16 PM   Result Value Ref Range    POC Glucose, Blood 188 (H) 65 - 99 mg/dL   POCT glucose device results    Collection Time: 06/18/22  8:15 PM   Result Value Ref Range    POC Glucose, Blood 166 (H) 65 - 99 mg/dL   Basic Metabolic Panel    Collection Time: 06/19/22  5:49 AM   Result Value Ref Range    Sodium 138 135 - 145 mmol/L    Potassium 3.6 3.6 - 5.5 mmol/L    Chloride 106 96 - 112 mmol/L    Co2 22 20 - 33 mmol/L    Glucose 103 (H) 65 - 99 mg/dL    Bun 23 (H) 8 - 22 mg/dL    Creatinine 0.85 0.50 - 1.40 mg/dL    Calcium 7.9 (L) 8.5 - 10.5 mg/dL    Anion Gap 10.0 7.0 - 16.0   MAGNESIUM    Collection Time: 06/19/22  5:49 AM   Result Value Ref Range    Magnesium 1.2 (L) 1.5 - 2.5 mg/dL   PHOSPHORUS    Collection Time: 06/19/22  5:49 AM   Result Value Ref Range    Phosphorus 3.6 2.5 - 4.5 mg/dL   ESTIMATED GFR    Collection Time: 06/19/22  5:49 AM   Result Value Ref Range    GFR (CKD-EPI) 90 >60 mL/min/1.73 m 2   POCT glucose device results    Collection Time: 06/19/22  7:43 AM   Result Value Ref Range    POC Glucose, Blood 102 (H) 65 - 99 mg/dL       Current Facility-Administered Medications   Medication Frequency   • baclofen (LIORESAL) tablet 5 mg TID PRN   • rivaroxaban (XARELTO) tablet  15 mg BID WITH MEALS    Followed by   • [START ON 7/9/2022] rivaroxaban (XARELTO) tablet 20 mg PM MEAL   • QUEtiapine (Seroquel) tablet 50 mg Nightly   • QUEtiapine (Seroquel) tablet 25 mg TID PRN   • magnesium oxide tablet 400 mg BID   • ferrous sulfate tablet 325 mg QDAY with Breakfast   • ascorbic acid (Vitamin C) tablet 500 mg DAILY   • diphenhydrAMINE-zinc acetate (BENADRYL ITCH) topical cream TID PRN   • traMADol (ULTRAM) 50 MG tablet 50 mg Q6HRS PRN   • vancomycin 50 mg/mL oral soln 125 mg Q6HRS   • insulin regular (HumuLIN R,NovoLIN R) injection 4X/DAY ACHS    And   • dextrose 50% (D50W) injection 25 g Q15 MIN PRN   • amLODIPine (NORVASC) tablet 10 mg Q DAY   • lidocaine (LIDODERM) 5 % 1 Patch Q24HR   • vitamin D3 (cholecalciferol) tablet 1,000 Units DAILY   • Respiratory Therapy Consult Continuous RT   • hydrALAZINE (APRESOLINE) tablet 25 mg Q8HRS PRN   • acetaminophen (Tylenol) tablet 650 mg Q4HRS PRN   • polyethylene glycol/lytes (MIRALAX) PACKET 1 Packet QDAY PRN    And   • magnesium hydroxide (MILK OF MAGNESIA) suspension 30 mL QDAY PRN    And   • bisacodyl (DULCOLAX) suppository 10 mg QDAY PRN   • omeprazole (PRILOSEC) capsule 20 mg DAILY   • artificial tears ophthalmic solution 1 Drop PRN   • benzocaine-menthol (Cepacol) lozenge 1 Lozenge Q2HRS PRN   • mag hydrox-al hydrox-simeth (MAALOX PLUS ES or MYLANTA DS) suspension 20 mL Q2HRS PRN   • ondansetron (ZOFRAN ODT) dispertab 4 mg 4X/DAY PRN    Or   • ondansetron (ZOFRAN) syringe/vial injection 4 mg 4X/DAY PRN   • traZODone (DESYREL) tablet 50 mg QHS PRN   • sodium chloride (OCEAN) 0.65 % nasal spray 2 Spray PRN   • midazolam (VERSED) 5 mg/mL (1 mL vial) PRN   • allopurinol (ZYLOPRIM) tablet 100 mg QDAY   • atorvastatin (LIPITOR) tablet 80 mg Q EVENING   • clopidogrel (PLAVIX) tablet 75 mg QDAY   • FLUoxetine (PROZAC) capsule 40 mg DAILY   • gabapentin (NEURONTIN) capsule 100 mg QHS PRN   • losartan (COZAAR) tablet 50 mg BID   • metoprolol SR (TOPROL  XL) tablet 100 mg Q EVENING   • tamsulosin (FLOMAX) capsule 0.4 mg DAILY       Orders Placed This Encounter   Procedures   • Diet Order Diet: Consistent CHO (Diabetic); Second Modifier: (optional): Cardiac     Standing Status:   Standing     Number of Occurrences:   1     Order Specific Question:   Diet:     Answer:   Consistent CHO (Diabetic) [4]     Order Specific Question:   Second Modifier: (optional)     Answer:   Cardiac [6]       Assessment:  Active Hospital Problems    Diagnosis    • *Acute encephalopathy    • Hyponatremia    • Dehydration    • Essential hypertension, benign    • Benign prostatic hyperplasia with urinary obstruction    • Diarrhea    • GERD with esophagitis    • Recurrent UTI    • Hypomagnesemia    • Type 2 diabetes mellitus, with long-term current use of insulin (HCC)    • Stage 3b chronic kidney disease ([facility])        Medical Decision Making and Plan:  Acute toxic encephalopathy - Patient with UTI with urosepsis s/p IV antibiotics. Patient has urinary retention and requires occasional catheterization putting him at risk for recurrent UTIs. With Decreased cognition, balance and strength in setting of previous CVA  -PT and OT for mobility and ADLs  -SLP for cognition  -Follow-up with [facility]     Sundowning - Patient with confusion at night and early morning. Will start Seroquel at 1900 and PRN TID    Previous R CVA - Patient with contracture and spasticity on R side. On Plavix and statin  -Will start low dose Baclofen although most likely contracture. Serial casting to start 6/8, therapy holds otherwise for GI and leukocytosis. Most likely removal on 6/14. Will discontinue Baclofen as having some more confusion. Removal on 6/14 with improved ROM. Repeat casting 6/17  - has skin itching under cast     Acute DVT with Leg pain  - generalized pain all over legs, will check dopplers to r/o DVT 6/18   - restarted low dose baclofen 5mg TID PRN spasms   - dopplers + for DVT, starting treatment  dose xarelto. Monitor for  Hematuria     HTN/CAD - Patient on Plavix. Patient on Losartan 50 mg BID, Metoprolol 100 mg XL  -Consult Hospitalist. Recommending TTE     HLD - Patient on Atorvastatin 80 mg QHS     Leukocytosis - On treatment for UTI. On Ancef with recommendation to switch to Augment. Will check CBC in AM before switch  -Repeat 16.5 up from 16.1, consult Hospitalist. Improved to 12.7 on 6/5/22. Repeat 6/8 with Leukocytosis 17.9 and diffuse loose stools.   -Check C diff. Check UA. Check Blood cultures. KUB with distention and featureless colon. Discussed with hospitalist and start antibiotics including Flagyl. CT abdomen - possible small left cyst bleeding. Will monitor CBC. On Ceftriaxone. Improving leukocytosis 11.7 on 6/10/22    C Diff positive on 6/8. On Vancomycin PO. Improving. Still having loose BMs on 6/13/22. Per hospitalist continue Vancomycin for 10 days  -No loose BMs in 48 hours. Having incontinence, will pause on discontinue contact precautions    Anemia - Check AM CBC - 12.2 Repeat 11.3, Fe low but with GI issues holding on starting 6/8. Will start now that GI under more control  - recheck CBC on 6/20 after starting xarelto for acute DVT      DM2 with hyperglycemia - Patient on Glargine 11 U and SSI  -Consult hospitalist     Depression - Patient on Fluoxetine 40 mg daily      Hx of Gout - Patient on Allopurinol 100 mg daily     Urinary Retention - Patient requiring catheterization ~1 time daily. Follows with Urology. Continue Flomax  -Hematuria on 6/7 with significant amount. Hold Xarelto. Flush bladder.  Improved hematuria   - monitor for worsening hematuria, restarted on treatment dose xarelto for acute dvt     Vitamin D Deficiency - 29 on admission. Start 1000 U     DVT Ppx - started on treatment dose xarelto on 6/18 for acute DVT     Total time:  40 minutes.  I spent greater than 50% of the time for patient care, counseling, and coordination on this date, including unit/floor time, and  face-to-face time with the patient as per interval events and assessment and plan above. Topics discussed included DVT, treatement dose xarelto, and baclofen.   Michelle Zepeda D.O.

## 2022-06-19 NOTE — CARE PLAN
"The patient is Stable - Low risk of patient condition declining or worsening    Shift Goals  Clinical Goals: safety and comfort  Patient Goals: pain control    Problem: Skin Integrity  Goal: Skin integrity is maintained or improved  Outcome: Progressing  Note:   Andrea Score: 13    Patient's skin remains intact and free from new or accidental injury this shift; no s/s of infection. RN wound protocol checked. Patient is on low air loss mattress. Encouraged hydration and educated about the importance of nutrition to keep skin integrity. Will continue to monitor.       Problem: Fall Risk - Rehab  Goal: Patient will remain free from falls  Outcome: Progressing  Note: Camilla Gruber Fall risk Assessment Score: 17    High fall risk Interventions   - Bed and strip alarm   - Yellow sign by the door   - Yellow wrist band \"Fall risk\"  - Room near to the nurse station  - Do not leave patient unattended in the bathroom  - Fall risk education provided     "

## 2022-06-20 PROBLEM — E87.6 HYPOKALEMIA: Status: RESOLVED | Noted: 2022-06-05 | Resolved: 2022-06-20

## 2022-06-20 PROBLEM — E83.39 HYPOPHOSPHATEMIA: Status: RESOLVED | Noted: 2022-06-05 | Resolved: 2022-06-20

## 2022-06-20 PROBLEM — D75.839 THROMBOCYTOSIS: Status: RESOLVED | Noted: 2022-06-08 | Resolved: 2022-06-20

## 2022-06-20 PROBLEM — R79.89 AZOTEMIA: Status: RESOLVED | Noted: 2020-07-06 | Resolved: 2022-06-20

## 2022-06-20 PROBLEM — A04.72 C. DIFFICILE COLITIS: Status: RESOLVED | Noted: 2022-06-01 | Resolved: 2022-06-20

## 2022-06-20 PROBLEM — R74.8 ALKALINE PHOSPHATASE ELEVATION: Status: RESOLVED | Noted: 2022-06-08 | Resolved: 2022-06-20

## 2022-06-20 LAB
GLUCOSE BLD STRIP.AUTO-MCNC: 195 MG/DL (ref 65–99)
GLUCOSE BLD STRIP.AUTO-MCNC: 205 MG/DL (ref 65–99)
GLUCOSE BLD STRIP.AUTO-MCNC: 94 MG/DL (ref 65–99)

## 2022-06-20 PROCEDURE — A9270 NON-COVERED ITEM OR SERVICE: HCPCS | Performed by: PHYSICAL MEDICINE & REHABILITATION

## 2022-06-20 PROCEDURE — RXMED WILLOW AMBULATORY MEDICATION CHARGE: Performed by: PHYSICAL MEDICINE & REHABILITATION

## 2022-06-20 PROCEDURE — A9270 NON-COVERED ITEM OR SERVICE: HCPCS | Performed by: HOSPITALIST

## 2022-06-20 PROCEDURE — 99232 SBSQ HOSP IP/OBS MODERATE 35: CPT | Performed by: HOSPITALIST

## 2022-06-20 PROCEDURE — 700102 HCHG RX REV CODE 250 W/ 637 OVERRIDE(OP): Performed by: PHYSICAL MEDICINE & REHABILITATION

## 2022-06-20 PROCEDURE — 97530 THERAPEUTIC ACTIVITIES: CPT

## 2022-06-20 PROCEDURE — 82962 GLUCOSE BLOOD TEST: CPT | Mod: 91

## 2022-06-20 PROCEDURE — 97535 SELF CARE MNGMENT TRAINING: CPT

## 2022-06-20 PROCEDURE — 700102 HCHG RX REV CODE 250 W/ 637 OVERRIDE(OP): Performed by: HOSPITALIST

## 2022-06-20 PROCEDURE — 99233 SBSQ HOSP IP/OBS HIGH 50: CPT | Performed by: PHYSICAL MEDICINE & REHABILITATION

## 2022-06-20 PROCEDURE — 770010 HCHG ROOM/CARE - REHAB SEMI PRIVAT*

## 2022-06-20 RX ORDER — TRAZODONE HYDROCHLORIDE 50 MG/1
50 TABLET ORAL
Qty: 30 TABLET | Refills: 3 | Status: SHIPPED | OUTPATIENT
Start: 2022-06-20 | End: 2022-06-20 | Stop reason: SDUPTHER

## 2022-06-20 RX ORDER — TRAMADOL HYDROCHLORIDE 50 MG/1
50 TABLET ORAL EVERY 6 HOURS PRN
Qty: 42 TABLET | Refills: 0 | Status: SHIPPED | OUTPATIENT
Start: 2022-06-20 | End: 2022-07-05

## 2022-06-20 RX ORDER — AMLODIPINE BESYLATE 10 MG/1
10 TABLET ORAL DAILY
Qty: 30 TABLET | Refills: 2 | Status: SHIPPED | OUTPATIENT
Start: 2022-06-21 | End: 2022-06-27

## 2022-06-20 RX ORDER — QUETIAPINE FUMARATE 50 MG/1
50 TABLET, FILM COATED ORAL NIGHTLY
Qty: 30 TABLET | Refills: 3 | Status: SHIPPED | OUTPATIENT
Start: 2022-06-20 | End: 2022-06-20 | Stop reason: SDUPTHER

## 2022-06-20 RX ORDER — QUETIAPINE FUMARATE 50 MG/1
50 TABLET, FILM COATED ORAL NIGHTLY
Qty: 30 TABLET | Refills: 3 | Status: SHIPPED | OUTPATIENT
Start: 2022-06-20 | End: 2022-06-27

## 2022-06-20 RX ORDER — TRAZODONE HYDROCHLORIDE 50 MG/1
50 TABLET ORAL
Qty: 30 TABLET | Refills: 3 | Status: SHIPPED | OUTPATIENT
Start: 2022-06-20 | End: 2022-08-25

## 2022-06-20 RX ORDER — OMEPRAZOLE 20 MG/1
20 CAPSULE, DELAYED RELEASE ORAL DAILY
Qty: 30 CAPSULE | Refills: 2 | Status: SHIPPED | OUTPATIENT
Start: 2022-06-20 | End: 2022-06-20 | Stop reason: SDUPTHER

## 2022-06-20 RX ORDER — INSULIN LISPRO 100 [IU]/ML
2-12 INJECTION, SOLUTION INTRAVENOUS; SUBCUTANEOUS
Status: DISCONTINUED | OUTPATIENT
Start: 2022-06-20 | End: 2022-06-21 | Stop reason: HOSPADM

## 2022-06-20 RX ORDER — TAMSULOSIN HYDROCHLORIDE 0.4 MG/1
0.4 CAPSULE ORAL DAILY
Qty: 30 CAPSULE | Refills: 2 | Status: SHIPPED | OUTPATIENT
Start: 2022-06-20 | End: 2022-06-20 | Stop reason: SDUPTHER

## 2022-06-20 RX ORDER — AMLODIPINE BESYLATE 10 MG/1
10 TABLET ORAL DAILY
Qty: 30 TABLET | Refills: 2 | Status: SHIPPED | OUTPATIENT
Start: 2022-06-21 | End: 2022-06-20 | Stop reason: SDUPTHER

## 2022-06-20 RX ORDER — OMEPRAZOLE 20 MG/1
20 CAPSULE, DELAYED RELEASE ORAL DAILY
Qty: 30 CAPSULE | Refills: 2 | Status: SHIPPED | OUTPATIENT
Start: 2022-06-20

## 2022-06-20 RX ORDER — TRAMADOL HYDROCHLORIDE 50 MG/1
50 TABLET ORAL EVERY 6 HOURS PRN
Qty: 42 TABLET | Refills: 0 | Status: SHIPPED | OUTPATIENT
Start: 2022-06-20 | End: 2022-06-20 | Stop reason: SDUPTHER

## 2022-06-20 RX ORDER — TAMSULOSIN HYDROCHLORIDE 0.4 MG/1
0.4 CAPSULE ORAL DAILY
Qty: 30 CAPSULE | Refills: 2 | Status: SHIPPED | OUTPATIENT
Start: 2022-06-20 | End: 2022-08-10

## 2022-06-20 RX ADMIN — TRAZODONE HYDROCHLORIDE 50 MG: 50 TABLET ORAL at 21:01

## 2022-06-20 RX ADMIN — TAMSULOSIN HYDROCHLORIDE 0.4 MG: 0.4 CAPSULE ORAL at 08:22

## 2022-06-20 RX ADMIN — OMEPRAZOLE 20 MG: 20 CAPSULE, DELAYED RELEASE ORAL at 08:22

## 2022-06-20 RX ADMIN — RIVAROXABAN 15 MG: 15 TABLET, FILM COATED ORAL at 08:22

## 2022-06-20 RX ADMIN — QUETIAPINE FUMARATE 50 MG: 25 TABLET ORAL at 18:24

## 2022-06-20 RX ADMIN — CLOPIDOGREL 75 MG: 75 TABLET, FILM COATED ORAL at 05:35

## 2022-06-20 RX ADMIN — OXYCODONE HYDROCHLORIDE AND ACETAMINOPHEN 500 MG: 500 TABLET ORAL at 08:22

## 2022-06-20 RX ADMIN — MAGNESIUM OXIDE TAB 400 MG (241.3 MG ELEMENTAL MG) 400 MG: 400 (241.3 MG) TAB at 21:01

## 2022-06-20 RX ADMIN — RIVAROXABAN 15 MG: 15 TABLET, FILM COATED ORAL at 17:29

## 2022-06-20 RX ADMIN — FLUOXETINE 40 MG: 20 CAPSULE ORAL at 08:22

## 2022-06-20 RX ADMIN — INSULIN LISPRO 2 UNITS: 100 INJECTION, SOLUTION INTRAVENOUS; SUBCUTANEOUS at 21:05

## 2022-06-20 RX ADMIN — FERROUS SULFATE TAB 325 MG (65 MG ELEMENTAL FE) 325 MG: 325 (65 FE) TAB at 08:22

## 2022-06-20 RX ADMIN — AMLODIPINE BESYLATE 10 MG: 5 TABLET ORAL at 05:34

## 2022-06-20 RX ADMIN — Medication 1000 UNITS: at 08:22

## 2022-06-20 RX ADMIN — MAGNESIUM OXIDE TAB 400 MG (241.3 MG ELEMENTAL MG) 400 MG: 400 (241.3 MG) TAB at 08:22

## 2022-06-20 RX ADMIN — ALLOPURINOL 100 MG: 100 TABLET ORAL at 05:34

## 2022-06-20 RX ADMIN — ATORVASTATIN CALCIUM 80 MG: 40 TABLET, FILM COATED ORAL at 21:01

## 2022-06-20 RX ADMIN — INSULIN LISPRO 4 UNITS: 100 INJECTION, SOLUTION INTRAVENOUS; SUBCUTANEOUS at 17:26

## 2022-06-20 RX ADMIN — LOSARTAN POTASSIUM 50 MG: 25 TABLET, FILM COATED ORAL at 08:22

## 2022-06-20 ASSESSMENT — ACTIVITIES OF DAILY LIVING (ADL)
TOILETING_LEVEL_OF_ASSIST: REQUIRES PHYSICAL ASSIST WITH TOILETING
TOILET_TRANSFER_LEVEL_OF_ASSIST: REQUIRES PHYSICAL ASSIST WITH TOILET TRANSFER
SHOWER_TRANSFER_LEVEL_OF_ASSIST: REQUIRES PHYSICAL ASSIST WITH SHOWER TRANSFER

## 2022-06-20 ASSESSMENT — ENCOUNTER SYMPTOMS
FEVER: 0
CHILLS: 0
NAUSEA: 0
SHORTNESS OF BREATH: 0
ABDOMINAL PAIN: 0
DIARRHEA: 0
NERVOUS/ANXIOUS: 0
VOMITING: 0

## 2022-06-20 ASSESSMENT — PAIN DESCRIPTION - PAIN TYPE: TYPE: ACUTE PAIN

## 2022-06-20 ASSESSMENT — PAIN SCALES - WONG BAKER: WONGBAKER_NUMERICALRESPONSE: DOESN'T HURT AT ALL

## 2022-06-20 NOTE — DISCHARGE SUMMARY
Rehab Discharge Summary    Admission Date: 5/31/2022    Discharge Date: 6/21/2022    Attending Provider: Katheryn Pratt MD/PhD    Admission Diagnosis:   Active Hospital Problems    Diagnosis    • Acute deep vein thrombosis (DVT) of right lower extremity (HCC)    • Thrombocytosis    • Alkaline phosphatase elevation    • Gout    • Hypokalemia    • Hypophosphatemia    • Vitamin D insufficiency    • C. difficile colitis    • Azotemia    • Essential hypertension, benign    • Benign prostatic hyperplasia with urinary obstruction    • Normocytic anemia    • GERD with esophagitis    • Recurrent UTI    • Depression    • Hypomagnesemia    • Type 2 diabetes mellitus, with long-term current use of insulin (HCC)    • H/O Cerebrovascular accident (CVA) in adulthood        Discharge Diagnosis:  Active Hospital Problems    Diagnosis    • Acute deep vein thrombosis (DVT) of right lower extremity (HCC)    • Thrombocytosis    • Alkaline phosphatase elevation    • Gout    • Hypokalemia    • Hypophosphatemia    • Vitamin D insufficiency    • C. difficile colitis    • Azotemia    • Essential hypertension, benign    • Benign prostatic hyperplasia with urinary obstruction    • Normocytic anemia    • GERD with esophagitis    • Recurrent UTI    • Depression    • Hypomagnesemia    • Type 2 diabetes mellitus, with long-term current use of insulin (HCC)    • H/O Cerebrovascular accident (CVA) in adulthood        HPI per H&P:  Patient is a 77 y.o. 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 Patient reportedly has a significant history of UTIs and requires catheterization at home. He was found to have elevated WBC and UTI positive for bacteria/yeast. He was started on IV antibiotics. Patient reportedly had his CVA in 2016 in [place] and then developed non-Hodgkin's Lymphoma.  He has had MSSA, klebsiella and proteus UTIs  in the past and this stay he grew MSSA. His antibiotics have been switched to Cefazolin as he was having worsening diarrhea. C diff was negative. Hospital course was complicated by hyperglycemia, leukocytosis, anemia, CATA and hyponatremia.      Patient was last evaluated on 5/30/22 with OT and was min-maxA for ADLs. Patient was last evaluated by PT on 5/30/22 and was min-maxA for mobility.  Patient was reportedly previously wheelchair bound with Lesia for transfer and mobility due to contractures of left side including left knee flexion contraction.     Patient was admitted to [facility] on 5/31/2022.     Hospital Course by Problem List:  Acute toxic encephalopathy - Patient with UTI with urosepsis s/p IV antibiotics. Patient has urinary retention and requires occasional catheterization putting him at risk for recurrent UTIs. With Decreased cognition, balance and strength in setting of previous CVA. Patient underwent acute inpatient rehabilitation from 5/31/22 to 6/21/22 with good improvement in mobility, cognition, and ADLs.  -Follow-up with [facility]     Sundowning - Patient with confusion at night and early morning. Will start Seroquel at 1900 and PRN TID     Previous R CVA - Patient with contracture and spasticity on R side. On Plavix and statin  -Will start low dose Baclofen although most likely contracture. Serial casting to start 6/8, therapy holds otherwise for GI and leukocytosis. Most likely removal on 6/14. Will discontinue Baclofen as having some more confusion. Removal on 6/14 with improved ROM. Repeat casting 6/17     HTN/CAD - Patient on Plavix. Patient on Losartan 50 mg BID, Metoprolol 100 mg XL  -Consult Hospitalist. Recommending TTE      HLD - Patient on Atorvastatin 80 mg QHS     Leukocytosis - On treatment for UTI. On Ancef with recommendation to switch to Augment. Will check CBC in AM before switch  -Repeat 16.5 up from 16.1, consult Hospitalist. Improved to 12.7 on  "6/5/22. Repeat 6/8 with Leukocytosis 17.9 and diffuse loose stools.   -Check C diff. Check UA. Check Blood cultures. KUB with distention and featureless colon. Discussed with hospitalist and start antibiotics including Flagyl. CT abdomen - possible small left cyst bleeding. Will monitor CBC. On Ceftriaxone. Improving leukocytosis 11.7 on 6/10/22     C Diff positive on 6/8. On Vancomycin PO. Improving. Still having loose BMs on 6/13/22. Per hospitalist continue Vancomycin for 10 days  -No loose BMs in 48 hours. Having incontinence, will pause on discontinue contact precautions     Anemia - Check AM CBC - 12.2 Repeat 11.3, Fe low but with GI issues holding on starting 6/8. Will start now that GI under more control     DM2 with hyperglycemia - Patient on Glargine 11 U and SSI  -Consult hospitalist     Depression - Patient on Fluoxetine 40 mg daily      Hx of Gout - Patient on Allopurinol 100 mg daily     Urinary Retention - Patient requiring catheterization ~1 time daily. Follows with Urology. Continue Flomax  -Hematuria on 6/7 with significant amount. Hold Xarelto. Flush bladder.  Improved hematuria      Vitamin D Deficiency - 29 on admission. Start 1000 U      DVT Ppx - Patient on Xarelto ppx dose.  -LLE DVT. Xarelto was on hold for previous bleeding. Discussed about treatment dose.     Functional Status at Discharge  Eating:  Stand by Assist (ate little. Pt was still in bed. HOB only elevated to about 60 and pt in kyphotic position. Alerted RN, CNA, and Charge. Already written on whiteboard. Now Added to yellow \"sticky\" note in pt's chart.)  Eating Description:  Set-up of equipment or meal/tube feeding (setup for bilateral tasks, able to feed self using RUE once setup)  Grooming:  Standby Assist, Seated  Grooming Description:  Seated in wheelchair at sink, Set-up of equipment, Supervision for safety (brush teeth, shave w/ electric razor and washed face with setup)  Bathing:  Total Assist x 2  Bathing Description:  " Grab bar, Hand held shower, Tub bench, Assit with back, Assit wtih lower extremities, Assit with perineal, Increased time, Initial preparation for task, Set-up of equipment, Supervision for safety, Set up for wound protection  Upper Body Dressing:  Moderate Assist  Upper Body Dressing Description:  Assit with threading arms through sleeves, Assist with pulling shirt over head, Increased time, Initial preparation for task, Set-up of equipment, Verbal cueing  Lower Body Dressing:  Total Assist  Lower Body Dressing Description:  Increased time, Initial preparation for task, Set-up of equipment, Verbal cueing     Walk:  Total Assist X 2 (max A from therapist; close WC follow by second person)  Distance Walked:  5  Number of Times Distance Was Traveled:  1  Assistive Device:   (R wall rail, L AFO)  Gait Deviation:  Ataxic, Step To, Decreased Base Of Support, Decreased Heel Strike, Decreased Toe Off, Bradykinetic (posterior lean)  Wheelchair:  Moderate Assist  Distance Propelled:  10   Wheelchair Description:  Assistance with steering, Extra time, Leg rest management, Impaired coordination, Limited by fatigue, Requires incidental assist, Safety concerns, Supervision for safety, Verbal cueing  Stairs Unable to Participate  Stairs Description       Comprehension:  Minimal Assist  Comprehension Description:  Verbal cues, Glasses  Expression:  Supervision  Expression Description:  Verbal cueing  Social Interaction:  Supervision  Social Interaction Description:  Increased time  Problem Solving:  Maximal Assist  Problem Solving Description:  Verbal cueing, Increased time, Therapy schedule, Seat belt, Bed/chair alarm  Memory:  Maximal Assist  Memory Description:  Increased time, Verbal cueing, Therapy schedule, Bed/chair alarm, Seat belt       Katheryn SANDOVAL M.D., personally performed a complete drug regimen review and no potential clinically significant medication issues were identified.   Discharge Medication:      Medication List      START taking these medications      Instructions   amLODIPine 10 MG Tabs  Commonly known as: NORVASC   Take 1 Tablet by mouth every day.  Dose: 10 mg     QUEtiapine 50 MG tablet  Commonly known as: Seroquel   Doctor's comments: For sundowning  Take 1 Tablet by mouth every evening.  Dose: 50 mg     traMADol 50 MG Tabs  Commonly known as: ULTRAM   Take 1 Tablet by mouth every 6 hours as needed for Moderate Pain or Severe Pain for up to 14 days.  Dose: 50 mg     traZODone 50 MG Tabs  Commonly known as: DESYREL   Take 1 Tablet by mouth at bedtime as needed for Sleep.  Dose: 50 mg        CHANGE how you take these medications      Instructions   clopidogrel 75 MG Tabs  What changed: when to take this  Commonly known as: PLAVIX   Take 1 Tablet by mouth every day.  Dose: 75 mg     fenofibrate 145 MG Tabs  What changed: when to take this  Commonly known as: TRICOR   Take 1 Tablet by mouth every day.  Dose: 145 mg     fluoxetine 40 MG capsule  What changed: when to take this  Commonly known as: PROZAC   TAKE 1 CAPSULE BY MOUTH EVERY DAY     gabapentin 100 MG Caps  What changed:   · how much to take  · how to take this  · when to take this  Commonly known as: NEURONTIN   TAKE 1 TO 3 CAPSULES BY MOUTH AT BEDTIME AS NEEDED FOR PAIN     metoprolol  MG Tb24  What changed: when to take this  Commonly known as: TOPROL XL   Take 1 Tablet by mouth every day.  Dose: 100 mg     * rivaroxaban 15 MG Tabs tablet  What changed:   · medication strength  · how much to take  · when to take this  Commonly known as: XARELTO   Take 1 Tablet by mouth 2 times a day with meals.  Dose: 15 mg     * rivaroxaban 20 MG Tabs tablet  Start taking on: July 9, 2022  What changed: You were already taking a medication with the same name, and this prescription was added. Make sure you understand how and when to take each.  Commonly known as: XARELTO   Doctor's comments: After 21 days of 15 BID with meals take 20 mg with PM  meal.  Take 1 Tablet by mouth with dinner.  Dose: 20 mg         * This list has 2 medication(s) that are the same as other medications prescribed for you. Read the directions carefully, and ask your doctor or other care provider to review them with you.            CONTINUE taking these medications      Instructions   acetaminophen 325 MG Tabs  Commonly known as: Tylenol   Take 2 Tablets by mouth every 6 hours as needed for Mild Pain, Moderate Pain or Fever.  Dose: 650 mg     allopurinol 100 MG Tabs  Commonly known as: ZYLOPRIM   TAKE 1 TABLET BY MOUTH EVERY DAY     atorvastatin 80 MG tablet  Commonly known as: LIPITOR   Take 1 Tablet by mouth every evening.  Dose: 80 mg     insulin regular 100 Unit/mL Soln  Commonly known as: HumuLIN R   Inject 1-6 Units under the skin 4 Times a Day,Before Meals and at Bedtime.  Dose: 1-6 Units     losartan 50 MG Tabs  Commonly known as: COZAAR   Take 1 Tablet by mouth 2 times a day.  Dose: 50 mg     omeprazole 20 MG delayed-release capsule  Commonly known as: PRILOSEC   Take 1 Capsule by mouth every day.  Dose: 20 mg     ondansetron 4 MG Tbdp  Commonly known as: ZOFRAN ODT   Take 1 Tablet by mouth every 8 hours as needed for Nausea.  Dose: 4 mg     * Insulin Pen Needle 32 G x 4 mm      * Pen Needles 32G X 4 MM Misc   1 Each 5 Times a Day. USE FOR INSULIN SHOTS FIVE TIMES DAILY  Dose: 1 Each     tamsulosin 0.4 MG capsule  Commonly known as: FLOMAX   Take 1 Capsule by mouth every day.  Dose: 0.4 mg     Toujeo SoloStar 300 UNIT/ML Sopn  Generic drug: Insulin Glargine (1 Unit Dial)   Doctor's comments: DO NOT FILL_ INFORMATION ONLY  Inject 14 Units under the skin every day. Per wife Sliding Scale, per wife gave 22 units on 1/18/2022  Dose: 14 Units         * This list has 2 medication(s) that are the same as other medications prescribed for you. Read the directions carefully, and ask your doctor or other care provider to review them with you.            STOP taking these medications     cephALEXin 500 MG Caps  Commonly known as: KEFLEX            Discharge Diet:  Regular, diabetic    Discharge Activity:  As tolerated     Disposition:  Patient to discharge home with family support and community resources.     Equipment:  Already has all equipment, added bivalve cast    Follow-up & Discharge Instructions:  Follow up with your primary care provider (PCP) within 7-10 days of discharge to review your medications and take over your care.     If you develop chest pain, fever, chills, change in neurologic function (weakness, sensation changes, vision changes), or other concerning sxs, seek immediate medical attention or call 911.      Condition on Discharge:  Good    More than 33 minutes was spent on discharging this patient, including face-to-face time, prescription management, and the dictation of this note.    Katheryn Pratt M.D.    Date of Service: 6/21/2022

## 2022-06-20 NOTE — DISCHARGE INSTRUCTIONS
Physical Therapy Discharge Instructions for Av Bob    6/20/2022    Level of Assist Required for Ambulation: Should Not Attempt Curbs at This Time, Should Not Attempt Stairs at This Time, Should Not Attempt Walking (defer for home therapists)  Level of Assist Required to Propel Wheelchair: Consistent Physical Assist  Level of Assist Required for Transfers: Physical Assist  Device Recommended for Transfers:  (transfer pole, wheelchair)  Good luck at home and keep up the good work Av! It was a pleasure working with you :) -[email or web address]  Occupational Therapy Discharge Instructions for Av Bob    6/20/2022    Level of Assist Required for Eating: Requires Supervision with Eating  Level of Assist Required for Grooming: Requires Supervision with Grooming  Level of Assist Required for Dressing: Requires Physical Assist with Dressing  Level of Assist Required for Toileting: Requires Physical Assist with Toileting  Level of Assist Required for Toilet Transfer: Requires Physical Assist with Toilet Transfer  Level of Assist Required for Bathing: Requires Physical Assist with Bathing  Equipment for Bathing: Shower Chair, Grab Bars in Tub / Shower  Level of Assist Required for Shower Transfer: Requires Physical Assist with Shower Transfer  Equipment for Shower Transfer: Grab Bars in Tub / Shower, Shower Chair  Level of Assist Required for Home Mgmt: Requires Physical Assist with Home Management  Level of Assist Required for Meal Prep: Requires Physical Assist with Meal Preparation  Driving: Please Contact Physician Prior to Driving  Home Exercise Program: None Issued  It was great to work with you Av! Keep up the hard work!- Carolyn Aleman OTR/L    Fall Prevention in the Home, Adult  Falls can cause injuries. They can happen to people of all ages. There are many things you can do to make your home safe and to help prevent falls. Ask for help when making these changes, if  needed.  What actions can I take to prevent falls?  General Instructions  Use good lighting in all rooms. Replace any light bulbs that burn out.  Turn on the lights when you go into a dark area. Use night-lights.  Keep items that you use often in easy-to-reach places. Lower the shelves around your home if necessary.  Set up your furniture so you have a clear path. Avoid moving your furniture around.  Do not have throw rugs and other things on the floor that can make you trip.  Avoid walking on wet floors.  If any of your floors are uneven, fix them.  Add color or contrast paint or tape to clearly naomy and help you see:  Any grab bars or handrails.  First and last steps of stairways.  Where the edge of each step is.  If you use a stepladder:  Make sure that it is fully opened. Do not climb a closed stepladder.  Make sure that both sides of the stepladder are locked into place.  Ask someone to hold the stepladder for you while you use it.  If there are any pets around you, be aware of where they are.  What can I do in the bathroom?         Keep the floor dry. Clean up any water that spills onto the floor as soon as it happens.  Remove soap buildup in the tub or shower regularly.  Use non-skid mats or decals on the floor of the tub or shower.  Attach bath mats securely with double-sided, non-slip rug tape.  If you need to sit down in the shower, use a plastic, non-slip stool.  Install grab bars by the toilet and in the tub and shower. Do not use towel bars as grab bars.  What can I do in the bedroom?  Make sure that you have a light by your bed that is easy to reach.  Do not use any sheets or blankets that are too big for your bed. They should not hang down onto the floor.  Have a firm chair that has side arms. You can use this for support while you get dressed.  What can I do in the kitchen?  Clean up any spills right away.  If you need to reach something above you, use a strong step stool that has a grab bar.  Keep  electrical cords out of the way.  Do not use floor polish or wax that makes floors slippery. If you must use wax, use non-skid floor wax.  What can I do with my stairs?  Do not leave any items on the stairs.  Make sure that you have a light switch at the top of the stairs and the bottom of the stairs. If you do not have them, ask someone to add them for you.  Make sure that there are handrails on both sides of the stairs, and use them. Fix handrails that are broken or loose. Make sure that handrails are as long as the stairways.  Install non-slip stair treads on all stairs in your home.  Avoid having throw rugs at the top or bottom of the stairs. If you do have throw rugs, attach them to the floor with carpet tape.  Choose a carpet that does not hide the edge of the steps on the stairway.  Check any carpeting to make sure that it is firmly attached to the stairs. Fix any carpet that is loose or worn.  What can I do on the outside of my home?  Use bright outdoor lighting.  Regularly fix the edges of walkways and driveways and fix any cracks.  Remove anything that might make you trip as you walk through a door, such as a raised step or threshold.  Trim any bushes or trees on the path to your home.  Regularly check to see if handrails are loose or broken. Make sure that both sides of any steps have handrails.  Install guardrails along the edges of any raised decks and porches.  Clear walking paths of anything that might make someone trip, such as tools or rocks.  Have any leaves, snow, or ice cleared regularly.  Use sand or salt on walking paths during winter.  Clean up any spills in your garage right away. This includes grease or oil spills.  What other actions can I take?  Wear shoes that:  Have a low heel. Do not wear high heels.  Have rubber bottoms.  Are comfortable and fit you well.  Are closed at the toe. Do not wear open-toe sandals.  Use tools that help you move around (mobility aids) if they are needed. These  include:  Canes.  Walkers.  Scooters.  Crutches.  Review your medicines with your doctor. Some medicines can make you feel dizzy. This can increase your chance of falling.  Ask your doctor what other things you can do to help prevent falls.  Where to find more information  Centers for Disease Control and PreventionRAYO: https://cdc.gov  [employer]: https://ct0obkv.mindy.nih.gov  Contact a doctor if:  You are afraid of falling at home.  You feel weak, drowsy, or dizzy at home.  You fall at home.  Summary  There are many simple things that you can do to make your home safe and to help prevent falls.  Ways to make your home safe include removing tripping hazards and installing grab bars in the bathroom.  Ask for help when making these changes in your home.  This information is not intended to replace advice given to you by your health care provider. Make sure you discuss any questions you have with your health care provider.  Document Released: 10/14/2010 Document Revised: 04/09/2020 Document Reviewed: 08/02/2018  [employer] Patient Education © 2020 [employer]      [facility] NURSING DISCHARGE INSTRUCTIONS    Blood Pressure : 132/67  Weight: 87.3 kg (192 lb 6.4 oz)  Nursing recommendations for Avshalom Allen Lisbeth at time of discharge are as follows:  Family Member verbalized understanding of all discharge instructions and prescriptions.     Review all your home medications and newly ordered medications with your doctor and/or pharmacist. Follow medication instructions as directed by your doctor and/or pharmacist.    Pain Management:   Discharge Pain Medication Instructions:  Comfort Goal: Sleep Comfortably, Comfort with Movement, Perform Activity, Stay Alert  Notify your primary care provider if pain is unrelieved with these measures, if the pain is new, or increased in intensity.    Discharge Skin Characteristics: Warm, Dry  Discharge Skin Exam: Clear    Skin / Wound Care  Instructions: Please contact your primary care physician for any change in skin integrity.     If You Have Surgical Incisions / Wounds:  Monitor surgical site(s) for signs of increased swelling, redness or symptoms of drainage from the site or fever as this could indicate signs and symptoms of infection. If these symptoms are noted, notifiy your primary care provider.      Discharge Safety Instructions: Should Not Be Left Alone In The House     Discharge Safety Concerns: Weakness, Balance Problems (Dizziness, Light Headedness), Impaired Judgement, History Of Falls, Unsteady Gait  The interdisciplinary team has made recommendation that you should not be left alone  in the house due to balance problem, impaired judgment, history of falls, weakness, and unsteady gait  Anti-embolic stockings are required during the day and off at night to increase circulation to the lower extremities.    Discharge Diet: diabetic     Discharge Liquids: thin  Discharge Bowel Function: Incontinent  Please contact your primary care physician for any changes in bowel habits.  Discharge Bowel Program:    Discharge Bladder Function: Incontinent  Discharge Urinary Devices: Brief, Pad      Nursing Discharge Plan:        Case Management Discharge Instructions:   Discharge Location:    Agency Name/Address/Phone:    Home Health:    Outpatient Services:    DME Provider/Phone:    Medical Equipment Ordered:    Prescription Faxed to:        Discharge Medication Instructions:  Below are the medications your physician expects you to take upon discharge:

## 2022-06-20 NOTE — CARE PLAN
"The patient is Stable - Low risk of patient condition declining or worsening    Shift Goals  Clinical Goals: Safety  Patient Goals:     Progress made toward(s) clinical / shift goals:    Problem: Fall Risk - Rehab  Goal: Patient will remain free from falls  Outcome: Progressing   Camilla Gruber Fall risk Assessment : 17    High fall risk Interventions     - Bed and strip alarm   - Yellow sign by the door   - Yellow wrist band \"Fall risk\"  - Room near to the nurse station  - Do not leave patient unattended in the bathroom  - Fall risk education provided  Pt uses call light consistently and appropriately. Waits for assistance does not attempt self transfer this shift. Able to verbalize needs.   "

## 2022-06-20 NOTE — CARE PLAN
"The patient is Watcher - Medium risk of patient condition declining or worsening    Shift Goals  Clinical Goals: Glucose management  Patient Goals: pain control    Problem: Skin Integrity  Goal: Skin integrity is maintained or improved  Outcome: Progressing  Note:   Andrea Score: 13    Patient's skin remains intact and free from new or accidental injury this shift; no s/s of infection. RN wound protocol checked. Patient is on low air loss mattress. Encouraged hydration and educated about the importance of nutrition to keep skin integrity. Will continue to monitor.       Problem: Fall Risk - Rehab  Goal: Patient will remain free from falls  Outcome: Progressing  Note: Camilla Gruber Fall risk Assessment Score: 17    High fall risk Interventions   - Bed and strip alarm   - Yellow sign by the door   - Yellow wrist band \"Fall risk\"  - Room near to the nurse station  - Do not leave patient unattended in the bathroom  - Fall risk education provided     "

## 2022-06-20 NOTE — PROGRESS NOTES
Rehab Progress Note     Encounter Date: 6/20/2022    CC: Decreased mobility, confusion    Interval Events (Subjective)  Patient sitting up in room. He reports his outing this morning home went very well. Discussed will meet up with PT and discuss the details. Otherwise he reports therapy is going well. Denies NVD. Denies SOB. He reports he was found to have a DVT in his LLE and started on AC. Discussed at length about distal DVT and need to continue AC.     IDT Team Meeting 6/16/2022  DC/Disposition:  6/21/22    Objective:  VITAL SIGNS: /77   Pulse 63   Temp 37 °C (98.6 °F) (Oral)   Resp 18   Ht 1.829 m (6')   Wt 87.3 kg (192 lb 6.4 oz)   SpO2 97%   BMI 26.09 kg/m²   Gen: NAD  Psych: Mood and affect appropriate  CV: RRR, no edema  Resp: CTAB, no upper airway sounds  Abd: NTND  Neuro: AOx4, following commands, left arm no longer in cast, wiggling fingers    Recent Results (from the past 72 hour(s))   POCT glucose device results    Collection Time: 06/17/22  5:11 PM   Result Value Ref Range    POC Glucose, Blood 184 (H) 65 - 99 mg/dL   POCT glucose device results    Collection Time: 06/17/22  9:19 PM   Result Value Ref Range    POC Glucose, Blood 183 (H) 65 - 99 mg/dL   POCT glucose device results    Collection Time: 06/18/22  7:49 AM   Result Value Ref Range    POC Glucose, Blood 104 (H) 65 - 99 mg/dL   POCT glucose device results    Collection Time: 06/18/22 10:59 AM   Result Value Ref Range    POC Glucose, Blood 181 (H) 65 - 99 mg/dL   POCT glucose device results    Collection Time: 06/18/22  5:16 PM   Result Value Ref Range    POC Glucose, Blood 188 (H) 65 - 99 mg/dL   POCT glucose device results    Collection Time: 06/18/22  8:15 PM   Result Value Ref Range    POC Glucose, Blood 166 (H) 65 - 99 mg/dL   Basic Metabolic Panel    Collection Time: 06/19/22  5:49 AM   Result Value Ref Range    Sodium 138 135 - 145 mmol/L    Potassium 3.6 3.6 - 5.5 mmol/L    Chloride 106 96 - 112 mmol/L    Co2 22 20 - 33  mmol/L    Glucose 103 (H) 65 - 99 mg/dL    Bun 23 (H) 8 - 22 mg/dL    Creatinine 0.85 0.50 - 1.40 mg/dL    Calcium 7.9 (L) 8.5 - 10.5 mg/dL    Anion Gap 10.0 7.0 - 16.0   MAGNESIUM    Collection Time: 06/19/22  5:49 AM   Result Value Ref Range    Magnesium 1.2 (L) 1.5 - 2.5 mg/dL   PHOSPHORUS    Collection Time: 06/19/22  5:49 AM   Result Value Ref Range    Phosphorus 3.6 2.5 - 4.5 mg/dL   ESTIMATED GFR    Collection Time: 06/19/22  5:49 AM   Result Value Ref Range    GFR (CKD-EPI) 90 >60 mL/min/1.73 m 2   POCT glucose device results    Collection Time: 06/19/22  7:43 AM   Result Value Ref Range    POC Glucose, Blood 102 (H) 65 - 99 mg/dL   POCT glucose device results    Collection Time: 06/19/22 11:21 AM   Result Value Ref Range    POC Glucose, Blood 189 (H) 65 - 99 mg/dL   POCT glucose device results    Collection Time: 06/19/22  4:55 PM   Result Value Ref Range    POC Glucose, Blood 195 (H) 65 - 99 mg/dL   POCT glucose device results    Collection Time: 06/19/22  9:09 PM   Result Value Ref Range    POC Glucose, Blood 218 (H) 65 - 99 mg/dL   POCT glucose device results    Collection Time: 06/20/22  7:18 AM   Result Value Ref Range    POC Glucose, Blood 94 65 - 99 mg/dL       Current Facility-Administered Medications   Medication Frequency   • insulin lispro (AdmeLOG,HumaLOG) injection 4X/DAY ACHS   • baclofen (LIORESAL) tablet 5 mg TID PRN   • rivaroxaban (XARELTO) tablet 15 mg BID WITH MEALS    Followed by   • [START ON 7/9/2022] rivaroxaban (XARELTO) tablet 20 mg PM MEAL   • QUEtiapine (Seroquel) tablet 50 mg Nightly   • QUEtiapine (Seroquel) tablet 25 mg TID PRN   • magnesium oxide tablet 400 mg BID   • ferrous sulfate tablet 325 mg QDAY with Breakfast   • ascorbic acid (Vitamin C) tablet 500 mg DAILY   • diphenhydrAMINE-zinc acetate (BENADRYL ITCH) topical cream TID PRN   • traMADol (ULTRAM) 50 MG tablet 50 mg Q6HRS PRN   • dextrose 50% (D50W) injection 25 g Q15 MIN PRN   • amLODIPine (NORVASC) tablet 10 mg Q  DAY   • lidocaine (LIDODERM) 5 % 1 Patch Q24HR   • vitamin D3 (cholecalciferol) tablet 1,000 Units DAILY   • Respiratory Therapy Consult Continuous RT   • hydrALAZINE (APRESOLINE) tablet 25 mg Q8HRS PRN   • acetaminophen (Tylenol) tablet 650 mg Q4HRS PRN   • polyethylene glycol/lytes (MIRALAX) PACKET 1 Packet QDAY PRN    And   • magnesium hydroxide (MILK OF MAGNESIA) suspension 30 mL QDAY PRN    And   • bisacodyl (DULCOLAX) suppository 10 mg QDAY PRN   • omeprazole (PRILOSEC) capsule 20 mg DAILY   • artificial tears ophthalmic solution 1 Drop PRN   • benzocaine-menthol (Cepacol) lozenge 1 Lozenge Q2HRS PRN   • mag hydrox-al hydrox-simeth (MAALOX PLUS ES or MYLANTA DS) suspension 20 mL Q2HRS PRN   • ondansetron (ZOFRAN ODT) dispertab 4 mg 4X/DAY PRN    Or   • ondansetron (ZOFRAN) syringe/vial injection 4 mg 4X/DAY PRN   • traZODone (DESYREL) tablet 50 mg QHS PRN   • sodium chloride (OCEAN) 0.65 % nasal spray 2 Spray PRN   • midazolam (VERSED) 5 mg/mL (1 mL vial) PRN   • allopurinol (ZYLOPRIM) tablet 100 mg QDAY   • atorvastatin (LIPITOR) tablet 80 mg Q EVENING   • clopidogrel (PLAVIX) tablet 75 mg QDAY   • FLUoxetine (PROZAC) capsule 40 mg DAILY   • gabapentin (NEURONTIN) capsule 100 mg QHS PRN   • losartan (COZAAR) tablet 50 mg BID   • metoprolol SR (TOPROL XL) tablet 100 mg Q EVENING   • tamsulosin (FLOMAX) capsule 0.4 mg DAILY       Orders Placed This Encounter   Procedures   • Diet Order Diet: Consistent CHO (Diabetic); Second Modifier: (optional): Cardiac     Standing Status:   Standing     Number of Occurrences:   1     Order Specific Question:   Diet:     Answer:   Consistent CHO (Diabetic) [4]     Order Specific Question:   Second Modifier: (optional)     Answer:   Cardiac [6]       Assessment:  Active Hospital Problems    Diagnosis    • *Acute encephalopathy    • Hyponatremia    • Dehydration    • Essential hypertension, benign    • Benign prostatic hyperplasia with urinary obstruction    • Diarrhea    •  GERD with esophagitis    • Recurrent UTI    • Hypomagnesemia    • Type 2 diabetes mellitus, with long-term current use of insulin (HCC)    • Stage 3b chronic kidney disease (HCC)        Medical Decision Making and Plan:  Acute toxic encephalopathy - Patient with UTI with urosepsis s/p IV antibiotics. Patient has urinary retention and requires occasional catheterization putting him at risk for recurrent UTIs. With Decreased cognition, balance and strength in setting of previous CVA  -PT and OT for mobility and ADLs  -SLP for cognition  -Follow-up with [facility]     Sundowning - Patient with confusion at night and early morning. Will start Seroquel at 1900 and PRN TID    Previous R CVA - Patient with contracture and spasticity on R side. On Plavix and statin  -Will start low dose Baclofen although most likely contracture. Serial casting to start 6/8, therapy holds otherwise for GI and leukocytosis. Most likely removal on 6/14. Will discontinue Baclofen as having some more confusion. Removal on 6/14 with improved ROM. Repeat casting 6/17    HTN/CAD - Patient on Plavix. Patient on Losartan 50 mg BID, Metoprolol 100 mg XL  -Consult Hospitalist. Recommending TTE     HLD - Patient on Atorvastatin 80 mg QHS     Leukocytosis - On treatment for UTI. On Ancef with recommendation to switch to Augment. Will check CBC in AM before switch  -Repeat 16.5 up from 16.1, consult Hospitalist. Improved to 12.7 on 6/5/22. Repeat 6/8 with Leukocytosis 17.9 and diffuse loose stools.   -Check C diff. Check UA. Check Blood cultures. KUB with distention and featureless colon. Discussed with hospitalist and start antibiotics including Flagyl. CT abdomen - possible small left cyst bleeding. Will monitor CBC. On Ceftriaxone. Improving leukocytosis 11.7 on 6/10/22    C Diff positive on 6/8. On Vancomycin PO. Improving. Still having loose BMs on 6/13/22. Per hospitalist continue Vancomycin for 10 days  -No loose BMs in 48 hours. Having  incontinence, will pause on discontinue contact precautions    Anemia - Check AM CBC - 12.2 Repeat 11.3, Fe low but with GI issues holding on starting 6/8. Will start now that GI under more control     DM2 with hyperglycemia - Patient on Glargine 11 U and SSI  -Consult hospitalist     Depression - Patient on Fluoxetine 40 mg daily      Hx of Gout - Patient on Allopurinol 100 mg daily     Urinary Retention - Patient requiring catheterization ~1 time daily. Follows with Urology. Continue Flomax  -Hematuria on 6/7 with significant amount. Hold Xarelto. Flush bladder.  Improved hematuria     Vitamin D Deficiency - 29 on admission. Start 1000 U     DVT Ppx - Patient on Xarelto ppx dose.  -LLE DVT. Xarelto was on hold for previous bleeding. Discussed about treatment dose.     Total time:  36 minutes.  I spent greater than 50% of the time for patient care, counseling, and coordination on this date, including unit/floor time, and face-to-face time with the patient as per interval events and assessment and plan above. Topics discussed included discharge planning, outing, completed antibiotics, discharge medications, xarelto, and risks and benefits of AC.     Katheryn Pratt M.D.

## 2022-06-20 NOTE — THERAPY
Occupational Therapy  Daily Treatment     Patient Name: Av Bob  Age:  77 y.o., Sex:  male  Medical Record #: [id number]  Today's Date: 6/20/2022     Precautions  Precautions: (P) Fall Risk  Comments: (P) L LE DVT; L eric from previous CVA poor OOB tolerance, wounds on buttocks, L resting hand splint and elbow bivalve when resting    Safety   ADL Safety : Requires Physical Assist for Safety (2 person)    Subjective    Pt reported he was feeling very tired today     Objective       06/20/22 0901   OT Charge Group   OT Self Care / ADL 2   OT Total Time Spent   OT Individual Total Time Spent (Mins) 30   Precautions   Precautions Fall Risk   Comments L LE DVT; L eric from previous CVA poor OOB tolerance, wounds on buttocks, L resting hand splint and elbow bivalve when resting   Functional Level of Assist   Upper Body Dressing Moderate Assist   Upper Body Dressing Description Assit with threading arms through sleeves;Assist with pulling shirt over head;Increased time;Initial preparation for task;Set-up of equipment;Verbal cueing   Lower Body Dressing Total Assist   Lower Body Dressing Description Increased time;Initial preparation for task;Set-up of equipment;Verbal cueing   Toileting Total Assist  (incontinent of bladder)   Toileting Description Increased time;Assist for hygiene;Assist to pull pants up;Assist to pull pants down  (rolling in bed to change brief)   Bed, Chair, Wheelchair Transfer Total Assist  (mod x1, min X1)   Bed Chair Wheelchair Transfer Description Increased time;Requires lift;Verbal cueing;Set-up of equipment   Interdisciplinary Plan of Care Collaboration   IDT Collaboration with  Nursing;Therapy Tech   Patient Position at End of Therapy Seated;Phone within Reach;Tray Table within Reach;Call Light within Reach   Collaboration Comments assist w/ care       Assessment    Pt tolerated session fair with focus on ADLs. Pt required increased assistance with UB dressing due to increased  drowsiness. Pt cont to scratch arm resulting small cuts and redness. Lotion was placed on pt's arm for comfort.  Strengths: Motivated for self care and independence, Pleasant and cooperative, Supportive family, Willingly participates in therapeutic activities  Barriers: Confused, Decreased endurance, Fatigue, Generalized weakness, Impaired activity tolerance, Impaired balance, Impaired functional cognition, Limited mobility    Plan      Monitor LUE (skin/circulation, comfort) -2 serial cast  removed on 6/20  Trial compression glove for L hand swelling, adjust resting hand splint as needed     Pressure sore prevention/mgmt, Attention to task, ADLs, rolling, 1 step directions, anterior weight shift L and R for progression of functional transfers, seated balance, STS    Occupational Therapy Goals (Active)       Problem: Bathing       Dates: Start: 06/01/22         Goal: STG-Within one week, patient will bathe w/ max A using DME as needed.        Dates: Start: 06/01/22         Goal Note filed on 06/15/22 1550 by Yara Aleman OT       Pt cont to require Total x2 for shower                 Problem: Dressing       Dates: Start: 06/01/22         Goal: STG-Within one week, patient will dress LB w/ mod A.        Dates: Start: 06/01/22         Goal Note filed on 06/15/22 1550 by Yara Aleman OT       Pt cont to require Total x2 for LB dressing                 Problem: Functional Transfers       Dates: Start: 06/01/22         Goal: STG-Within one week, patient will transfer to toilet w/ max A using DME as needed.       Dates: Start: 06/01/22         Goal Note filed on 06/15/22 1550 by Yara Aleman OT       Pt cont to require Total x2 for toilet transfer              Goal: STG-Within one week, patient will transfer to step in shower with max A using DME as needed.       Dates: Start: 06/01/22         Goal Note filed on 06/15/22 1550 by Yara Aleman OT       Pt cont to require Total x2 for shower transfer                  Problem: OT Long Term Goals       Dates: Start: 06/01/22         Goal: LTG-By discharge, patient will complete basic self care tasks with min A using DME and AE as needed.       Dates: Start: 06/01/22            Goal: LTG-By discharge, patient will perform bathroom transfers w/ min A using DME and AE as needed.       Dates: Start: 06/01/22               Problem: Toileting       Dates: Start: 06/01/22         Goal: STG-Within one week, patient will complete toileting tasks w/ max A using DME as needed.        Dates: Start: 06/01/22         Goal Note filed on 06/15/22 2060 by Yara Aleman, OT       Pt cont to require Total x2 for toileting due to incontinence

## 2022-06-20 NOTE — THERAPY
Physical Therapy   Daily Treatment     Patient Name: Av Bob  Age:  77 y.o., Sex:  male  Medical Record #: [id number]  Today's Date: 6/20/2022     Precautions  Precautions: (P) Fall Risk  Comments: (P) L LE DVT; L eric from previous CVA poor OOB tolerance, wounds on buttocks, L resting hand splint and elbow bivalve when resting    Subjective    Patient seated in w/c and agreeable to home evaluation.     Objective       06/20/22 0931   PT Charge Group   PT Therapeutic Activities 6   PT Total Time Spent   PT Individual Total Time Spent (Mins) 90   Precautions   Precautions Fall Risk   Comments L LE DVT; L eric from previous CVA poor OOB tolerance, wounds on buttocks, L resting hand splint and elbow bivalve when resting   Pain 0 - 10 Group   Location Buttock   Location Orientation Posterior   Interdisciplinary Plan of Care Collaboration   IDT Collaboration with  Nursing;Occupational Therapist;Physician;;Family / Caregiver;Therapy Tech   Patient Position at End of Therapy Seated;Self Releasing Lap Belt Applied  (therapy tech assisting patient back to room)   Collaboration Comments home evaluation results      Patient participating in home evaluation, being transported in w/c using w/c van lift to enter/exit van. Wife participating in hands on training for stand pivot transfers w/c<>standard bed using transfer pole and gait belt with Min A. Patient able to roll on bed using bed rail with Min A for incontinence management. Patient and wife able to perform bed mobility with Min A and cues. Car transfer attempted with wife as primary caregiver; mid-transfer PT intervened due to poor safety and technique. Patient requiring total assistance x2 to return to w/c safely. Recommending two-person assistance for car transfer; also discussed using RTC access and performing adaptive car modifications (passenger swivel seat to facilitate safer/easier transfers). Wife reports feeling comfortable performing bed  mobility and transfers with patient, states they will perform toileting and bathing at bed level until home health deems safe to trial.    Assessment    Patient tolerated session well, home evaluation completed with patient and wife performing necessary mobility. Patient would benefit from additional days at rehab to increase strength and safety for mobility, particularly car transfers; however if not possible wife states that she feels prepared to assist patient at home.    Strengths: Able to follow instructions, Motivated for self care and independence, Pleasant and cooperative, Supportive family, Willingly participates in therapeutic activities  Barriers: Bladder incontinence, Decreased endurance, Hemiparesis, Impaired activity tolerance, Impaired balance, Limited mobility    Plan    Anticipated d/c tomorrow to home with home health PT.    Physical Therapy Problems (Active)       Problem: Mobility Transfers       Dates: Start: 06/01/22         Goal: STG-Within one week, patient will transfer bed to chair with Max Ax1 via squat/stand pivot.       Dates: Start: 06/01/22               Problem: PT-Long Term Goals       Dates: Start: 06/01/22         Goal: LTG-By discharge, patient will propel wheelchair 50 ft mod I.       Dates: Start: 06/01/22            Goal: LTG-By discharge, patient will ambulate 50 ft with LRAD and Min A.       Dates: Start: 06/01/22            Goal: LTG-By discharge, patient will transfer one surface to another with CGA and LRAD.       Dates: Start: 06/01/22            Goal: LTG-By discharge, patient will transfer in/out of a car with CGA and LRAD.       Dates: Start: 06/01/22            Goal: LTG-By discharge, patient will perform bed mobility independently.       Dates: Start: 06/01/22

## 2022-06-20 NOTE — PROGRESS NOTES
[facility] Medicine Daily Progress Note    Date of Service  6/20/2022    Chief Complaint:  Hypertension  Diabetes  Leukocytosis    Interval History:  Pt now has his left arm cast off.    Review of Systems  Review of Systems   Constitutional: Negative for chills and fever.   Respiratory: Negative for shortness of breath.    Cardiovascular: Negative for chest pain.   Gastrointestinal: Negative for abdominal pain, diarrhea, nausea and vomiting.   Psychiatric/Behavioral: The patient is not nervous/anxious.         Physical Exam  Temp:  [36.6 °C (97.8 °F)-37 °C (98.6 °F)] 37 °C (98.6 °F)  Pulse:  [63-74] 63  Resp:  [18] 18  BP: (133-142)/(63-77) 139/77  SpO2:  [96 %-97 %] 97 %    Physical Exam  Vitals and nursing note reviewed.   Constitutional:       Appearance: Normal appearance.   HENT:      Head: Atraumatic.   Eyes:      Conjunctiva/sclera: Conjunctivae normal.      Pupils: Pupils are equal, round, and reactive to light.   Cardiovascular:      Rate and Rhythm: Normal rate and regular rhythm.      Heart sounds: No murmur heard.  Pulmonary:      Effort: Pulmonary effort is normal.      Breath sounds: No stridor. No wheezing or rales.   Abdominal:      General: There is no distension.      Palpations: Abdomen is soft.      Tenderness: There is no abdominal tenderness.   Musculoskeletal:      Cervical back: Normal range of motion and neck supple.      Right lower leg: No edema.      Left lower leg: No edema.   Skin:     General: Skin is warm and dry.      Findings: No rash.   Neurological:      Mental Status: He is alert and oriented to person, place, and time.   Psychiatric:         Mood and Affect: Mood normal.         Behavior: Behavior normal.         Fluids    Intake/Output Summary (Last 24 hours) at 6/20/2022 0954  Last data filed at 6/19/2022 2218  Gross per 24 hour   Intake 220 ml   Output --   Net 220 ml       Laboratory      Recent Labs     06/19/22  0549   SODIUM 138   POTASSIUM 3.6   CHLORIDE 106   CO2 22    GLUCOSE 103*   BUN 23*   CREATININE 0.85   CALCIUM 7.9*                   Imaging    Assessment/Plan  Acute deep vein thrombosis (DVT) of right lower extremity (HCC)  Assessment & Plan  US (): acute appearing right posterior tibial vein DVT                   subacute appearing left common femoral vein DVT and proximal femoral  vein junction DVT  On Xarelto    Gout  Assessment & Plan  On Allopurinol    Hypokalemia  Assessment & Plan  K+: 3.6 ()  Off supplements  Note: pt has a hx of hypo-K+ (unknown reason) and takes supplements at home  Monitor    C. difficile colitis  Assessment & Plan  C. Diff (+)  S/P leukocytosis  S/P diarrhea   S/P oral Vanco    Vitamin D insufficiency  Assessment & Plan  Vit D: 29  On supplements    Azotemia  Assessment & Plan  Bun: 23 () --> 20 () --> 23 ()  Encouraging fluid intake  Monitor    Essential hypertension, benign- (present on admission)  Assessment & Plan  BP ok  On Norvasc  On Cozaar  On Toprol XL  Note: on Flomax  Cont to monitor    Benign prostatic hyperplasia with urinary obstruction- (present on admission)  Assessment & Plan  S/P TURP in 2022 w/ Dr. Barry  Gross hematuria resolved w/ CBI x 24 hours on 22  Pt's wife reports that Urology told her to expect intermittent hematuria for months following TURP  But suspect bleeding may have been from left kidney hemorrhage (see CT Abd/Pelvis 22)  Needs  F/U    Normocytic anemia- (present on admission)  Assessment & Plan  Hb: 10.4  Fe 20, sats 13%  On Fe supplements    GERD with esophagitis- (present on admission)  Assessment & Plan  On Prilosec    Recurrent UTI- (present on admission)  Assessment & Plan  Has hx of recurrent UTI  Was adm to [facility] for UTI, diarrhea, and confusion  S/P recent UTI, s/p abx  Note: pt self caths at home    Depression  Assessment & Plan  On Prozac    Hypomagnesemia- (present on admission)  Assessment & Plan  M.3 () --> 1.1 () --> 1.3 () --> 1.2 ()  S/P  IV Mg 2g x 1 dose ( x 3 runs -- last on 6/19)  On oral supplements  Note: pt has a hx of hypo-mg (unknown reason) and takes supplements at home  Monitor    H/O Cerebrovascular accident (CVA) in adulthood- (present on admission)  Assessment & Plan  On Plavix and Lipitor    Type 2 diabetes mellitus, with long-term current use of insulin (HCC)- (present on admission)  Assessment & Plan  Hba1c: 6.6 (6/1)  BS labile  Off Glargine (had recent lower BS)  Managing with SS coverage or now  Will change SS coverage from Regular to Lispro  Note: home meds include Trulicity 3 mg weekly, Toujeo 5-12 units daily, Humalog 5 units base and per SS  Cont to monitor

## 2022-06-20 NOTE — CARE PLAN
Problem: Bathing  Goal: STG-Within one week, patient will bathe w/ max A using DME as needed.   Outcome: Discharged - Not Met     Problem: Dressing  Goal: STG-Within one week, patient will dress LB w/ mod A.   Outcome: Discharged - Not Met     Problem: Toileting  Goal: STG-Within one week, patient will complete toileting tasks w/ max A using DME as needed.   Outcome: Discharged - Not Met     Problem: Functional Transfers  Goal: STG-Within one week, patient will transfer to toilet w/ max A using DME as needed.  Outcome: Discharged - Not Met  Goal: STG-Within one week, patient will transfer to step in shower with max A using DME as needed.  Outcome: Discharged - Not Met     Problem: OT Long Term Goals  Goal: LTG-By discharge, patient will complete basic self care tasks with min A using DME and AE as needed.  Outcome: Discharged - Not Met  Goal: LTG-By discharge, patient will perform bathroom transfers w/ min A using DME and AE as needed.  Outcome: Discharged - Not Met     Pt conts to require increased care for all Adls.

## 2022-06-20 NOTE — THERAPY
Physical Therapy   Daily Treatment     Patient Name: Av Bob  Age:  77 y.o., Sex:  male  Medical Record #: [id number]  Today's Date: 6/20/2022     Precautions  Precautions: (P) Fall Risk  Comments: (P) L LE DVT; L eric from previous CVA poor OOB tolerance, wounds on buttocks, L resting hand splint and elbow bivalve when resting    Subjective    Patient in bed and agreeable to serial cast removal.     Objective       06/20/22 0701   PT Charge Group   PT Therapeutic Activities 4   PT Total Time Spent   PT Individual Total Time Spent (Mins) 60   Precautions   Precautions Fall Risk   Comments L LE DVT; L eric from previous CVA poor OOB tolerance, wounds on buttocks, L resting hand splint and elbow bivalve when resting   Interdisciplinary Plan of Care Collaboration   IDT Collaboration with  Nursing;Occupational Therapist;Therapy Tech   Patient Position at End of Therapy In Bed;Call Light within Reach;Tray Table within Reach;Phone within Reach   Collaboration Comments cast removal/bivalve creation; therapy tech assisting with session     Serial cast #2 removed in supine; blankets used to protect from fiberglass particles however nursing notified to change bed sheets when patient gets out of bed. Small open area of skin on patient's forearm; appears that scab had formed prior to casting and was inadvertently removed with removal of cast.          Post-cast removal   Skin tear    LUE elbow extension PROM after cast removal measured to be -35 degrees.    LUE elbow bivalves created to maintain ROM.      Assessment    Patient tolerated session well, good tolerance to casting with exception of skin itchiness. Receptive to education and no new complaints.    Strengths: Able to follow instructions, Motivated for self care and independence, Pleasant and cooperative, Supportive family, Willingly participates in therapeutic activities  Barriers: Bladder incontinence, Decreased endurance, Hemiparesis, Impaired activity  tolerance, Impaired balance, Limited mobility    Plan    Bed mobility training, transfer training (squat pivot currently), standing tolerance/balance, gait training (currently using R wall rail), ROM/stretching, neuro re-ed for L hemibody, encourage anterior weight shifting/leaning, serial casting to LUE applied on 6/17/22 and removed on 6/20/22. Please check in with patient on comfort/adverse symptoms and skin checks. Home eval scheduled for Monday 6/20/22. [place] education with patient and wife.     Passport items to be completed:  Get in/out of bed safely, in/out of a vehicle, safely use mobility device, walk or wheel around home/community, navigate up and down stairs, show how to get up/down from the ground, ensure home is accessible, demonstrate HEP, complete caregiver training      Physical Therapy Problems (Active)       Problem: Mobility Transfers       Dates: Start: 06/01/22         Goal: STG-Within one week, patient will transfer bed to chair with Max Ax1 via squat/stand pivot.       Dates: Start: 06/01/22               Problem: PT-Long Term Goals       Dates: Start: 06/01/22         Goal: LTG-By discharge, patient will propel wheelchair 50 ft mod I.       Dates: Start: 06/01/22            Goal: LTG-By discharge, patient will ambulate 50 ft with LRAD and Min A.       Dates: Start: 06/01/22            Goal: LTG-By discharge, patient will transfer one surface to another with CGA and LRAD.       Dates: Start: 06/01/22            Goal: LTG-By discharge, patient will transfer in/out of a car with CGA and LRAD.       Dates: Start: 06/01/22            Goal: LTG-By discharge, patient will perform bed mobility independently.       Dates: Start: 06/01/22

## 2022-06-20 NOTE — DISCHARGE PLANNING
Spoke with patient, Received choice for Home Health. Patient and wife are ready to be discharged home tomorrow. No DME needed. Reviewed IMM.

## 2022-06-21 ENCOUNTER — PHARMACY VISIT (OUTPATIENT)
Dept: PHARMACY | Facility: MEDICAL CENTER | Age: 77
End: 2022-06-21
Payer: COMMERCIAL

## 2022-06-21 ENCOUNTER — PATIENT MESSAGE (OUTPATIENT)
Dept: CARDIOLOGY | Facility: MEDICAL CENTER | Age: 77
End: 2022-06-21
Payer: MEDICARE

## 2022-06-21 VITALS
HEART RATE: 66 BPM | RESPIRATION RATE: 12 BRPM | SYSTOLIC BLOOD PRESSURE: 123 MMHG | OXYGEN SATURATION: 96 % | DIASTOLIC BLOOD PRESSURE: 65 MMHG | BODY MASS INDEX: 26.06 KG/M2 | HEIGHT: 72 IN | TEMPERATURE: 98 F | WEIGHT: 192.4 LBS

## 2022-06-21 LAB
ANION GAP SERPL CALC-SCNC: 10 MMOL/L (ref 7–16)
BUN SERPL-MCNC: 24 MG/DL (ref 8–22)
CALCIUM SERPL-MCNC: 8 MG/DL (ref 8.5–10.5)
CHLORIDE SERPL-SCNC: 105 MMOL/L (ref 96–112)
CO2 SERPL-SCNC: 23 MMOL/L (ref 20–33)
CREAT SERPL-MCNC: 1 MG/DL (ref 0.5–1.4)
GFR SERPLBLD CREATININE-BSD FMLA CKD-EPI: 78 ML/MIN/1.73 M 2
GLUCOSE BLD STRIP.AUTO-MCNC: 119 MG/DL (ref 65–99)
GLUCOSE BLD STRIP.AUTO-MCNC: 179 MG/DL (ref 65–99)
GLUCOSE SERPL-MCNC: 131 MG/DL (ref 65–99)
MAGNESIUM SERPL-MCNC: 1.3 MG/DL (ref 1.5–2.5)
PHOSPHATE SERPL-MCNC: 3.2 MG/DL (ref 2.5–4.5)
POTASSIUM SERPL-SCNC: 4.1 MMOL/L (ref 3.6–5.5)
SODIUM SERPL-SCNC: 138 MMOL/L (ref 135–145)

## 2022-06-21 PROCEDURE — 700102 HCHG RX REV CODE 250 W/ 637 OVERRIDE(OP): Performed by: PHYSICAL MEDICINE & REHABILITATION

## 2022-06-21 PROCEDURE — A9270 NON-COVERED ITEM OR SERVICE: HCPCS | Performed by: PHYSICAL MEDICINE & REHABILITATION

## 2022-06-21 PROCEDURE — 700102 HCHG RX REV CODE 250 W/ 637 OVERRIDE(OP): Performed by: HOSPITALIST

## 2022-06-21 PROCEDURE — 99239 HOSP IP/OBS DSCHRG MGMT >30: CPT | Performed by: PHYSICAL MEDICINE & REHABILITATION

## 2022-06-21 PROCEDURE — A9270 NON-COVERED ITEM OR SERVICE: HCPCS | Performed by: HOSPITALIST

## 2022-06-21 PROCEDURE — 700101 HCHG RX REV CODE 250: Performed by: PHYSICAL MEDICINE & REHABILITATION

## 2022-06-21 PROCEDURE — 36415 COLL VENOUS BLD VENIPUNCTURE: CPT

## 2022-06-21 PROCEDURE — 84100 ASSAY OF PHOSPHORUS: CPT

## 2022-06-21 PROCEDURE — 80048 BASIC METABOLIC PNL TOTAL CA: CPT

## 2022-06-21 PROCEDURE — 82962 GLUCOSE BLOOD TEST: CPT

## 2022-06-21 PROCEDURE — 83735 ASSAY OF MAGNESIUM: CPT

## 2022-06-21 RX ADMIN — FERROUS SULFATE TAB 325 MG (65 MG ELEMENTAL FE) 325 MG: 325 (65 FE) TAB at 08:28

## 2022-06-21 RX ADMIN — ALLOPURINOL 100 MG: 100 TABLET ORAL at 05:24

## 2022-06-21 RX ADMIN — CLOPIDOGREL 75 MG: 75 TABLET, FILM COATED ORAL at 05:24

## 2022-06-21 RX ADMIN — LOSARTAN POTASSIUM 50 MG: 25 TABLET, FILM COATED ORAL at 08:28

## 2022-06-21 RX ADMIN — RIVAROXABAN 15 MG: 15 TABLET, FILM COATED ORAL at 08:28

## 2022-06-21 RX ADMIN — FLUOXETINE 40 MG: 20 CAPSULE ORAL at 08:28

## 2022-06-21 RX ADMIN — OMEPRAZOLE 20 MG: 20 CAPSULE, DELAYED RELEASE ORAL at 08:28

## 2022-06-21 RX ADMIN — OXYCODONE HYDROCHLORIDE AND ACETAMINOPHEN 500 MG: 500 TABLET ORAL at 08:28

## 2022-06-21 RX ADMIN — MAGNESIUM OXIDE TAB 400 MG (241.3 MG ELEMENTAL MG) 400 MG: 400 (241.3 MG) TAB at 08:28

## 2022-06-21 RX ADMIN — INSULIN LISPRO 2 UNITS: 100 INJECTION, SOLUTION INTRAVENOUS; SUBCUTANEOUS at 11:09

## 2022-06-21 RX ADMIN — AMLODIPINE BESYLATE 10 MG: 5 TABLET ORAL at 05:24

## 2022-06-21 RX ADMIN — LIDOCAINE 1 PATCH: 50 PATCH TOPICAL at 12:07

## 2022-06-21 RX ADMIN — TAMSULOSIN HYDROCHLORIDE 0.4 MG: 0.4 CAPSULE ORAL at 08:28

## 2022-06-21 RX ADMIN — Medication 1000 UNITS: at 08:28

## 2022-06-21 ASSESSMENT — PAIN SCALES - WONG BAKER: WONGBAKER_NUMERICALRESPONSE: DOESN'T HURT AT ALL

## 2022-06-21 ASSESSMENT — PAIN DESCRIPTION - PAIN TYPE: TYPE: ACUTE PAIN

## 2022-06-21 NOTE — DISCHARGE PLANNING
Spoke with patient in lobby. Patient and family ready to go home. Home Health has already set up dates to see patient. No other needs at this time.

## 2022-06-21 NOTE — PROGRESS NOTES
Patient discharged to home per order.  Discharge instructions reviewed with patient and wife, they verbalized understanding. Signed copies placed in chart.  Patient has all belongings; signed copy of form in chart.  Patient left facility at 1330, in wheel chair accompanied by rehab staff and wife.  Have enjoyed working with this pleasant patient.

## 2022-06-21 NOTE — CARE PLAN
Problem: Psychosocial  Goal: Patient's level of anxiety will decrease  Note: Patient is cooperative and calm this shift.      Problem: Pain - Standard  Goal: Alleviation of pain or a reduction in pain to the patient’s comfort goal  Flowsheets (Taken 6/20/2022 2000)  Non Verbal Scale: Calm  Pain Rating Scale (NPRS): 0  Note: Patient does not report having pain this shift.    The patient is Stable - Low risk of patient condition declining or worsening

## 2022-06-21 NOTE — DISCHARGE PLANNING
Per Dottie from [facility] they will accept patient and be calling them to set up appointments.

## 2022-06-22 ENCOUNTER — PATIENT OUTREACH (OUTPATIENT)
Dept: MEDICAL GROUP | Facility: PHYSICIAN GROUP | Age: 77
End: 2022-06-22
Payer: MEDICARE

## 2022-06-22 NOTE — PROGRESS NOTES
6/22/22 9:45am:  CM post discharge outreach call first attempt.  VM left with CM contact number requesting return call.    6/22/22 2:00pm:  Nurse CM returned message that was left by spouse, Usha Bob. Spouse states patient has been doing okay. Reports he continues to have pain in left elbow. Spouse states patient did have a cast on arm in hospital. States she will discuss elbow pain with provider at follow-up if pain continues.  Nurse attempted to review current medication list. Spouse states she doesn't want to review at this time as she states she spent 45 minutes yesterday reviewing medications with pharmacist at Rehab.  Nurse did attempt to review current medication list but again spouse declining. States she will discuss with home care nurse tomorrow. Spouse will contact PCP via [employer] if any additional questions or concerns.

## 2022-06-22 NOTE — PROGRESS NOTES
Subjective:     Av Bob is a 77 y.o. male who presents for Hospital Follow-up.    POST DISCHARGE CALL:  Discharge Date:6/21/2022   Date of Outreach Call: 6/22/2022  1:45 PM  Now that you're home, how are you doing? Fair  Comment:Spouse reports patient having pain in L. Elbow.  States patient did have a cast on L. arm in hospital. Spouse  will notify PCP if pain continues.  Do you have questions about your medications? No  Comment:Spouse states pharmacist spent 45 minutes  yesterday reviewing medication list. Spouse declining nurse  review of medications. Nurse attempted several times to  review but spouse declined.  Did you fill your medications? Yes  Do you have a follow-up appointment scheduled?Yes  Comment:PCP 6/27/22  Discharging Department: [facility]  Number of Attempts: 2  Current or previous attempts completed within two business days of discharge? Yes  Provided education regarding treatment plan, medication, self-management, ADLs? Yes  Has patient completed Advance Directive? If yes, advise them to bring to appointment. No  Care Manager phone number provided? Yes  Is there anything else I can help you with? No    HPI:   Problem   Hospital Discharge Follow-Up    Av was admitted to the hospital from 5/28 to 5/31/2022 for weakness and sepsis related to urinary tract infection.  He was discharged to the [facility] rehab for 3 weeks to try to improve mobility and ADLs due to prior stroke.  This was complicated by C. difficile infection as well as lower extremity DVTs.  Diarrhea resolved with oral vancomycin.  Xarelto was changed from prophylaxis to treatment dose and will have to continue for 3 months.  He had sundowning and confusion while at rehab and was given Seroquel as well as trazodone.  He has a chronic contracture of left upper extremity so they worked on serial casting to try to improve the extension of the left upper extremity, he has since developed significant pain in  the left elbow which has sustained even since discharge home and is being managed with Tylenol, tramadol, and gabapentin.  His blood pressure was variable while he was inpatient so his amlodipine was added back however home readings and in clinic readings now are running low again.  Kidney function has improved.  He has some nocturnal elevations of blood sugar, Trulicity is currently on hold.  Magnesium levels are running low despite ongoing use of supplement.  His clopidogrel was discontinued due to hematuria and melena after hospital discharge.     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  He has continued to have residual pain in the left elbow on the lateral aspect of the olecranon and towards the outer aspect of the elbow itself.  He describes it as a severe pain especially worsened with getting dressed or manipulation of the extremity.  He notes that the contracture is somewhat better but the pain is difficult to tolerate and wonders if he needs some follow-up imaging.  He is taking tramadol twice daily, Tylenol throughout the day, and gabapentin at bedtime.  He thinks the pain may be getting slightly better but is still very uncomfortable.     Acute deep vein thrombosis (DVT) of RLE and partial DVT of LLE    US LE (6/2022):    1.  Acute appearing RIGHT posterior tibial vein DVT.   2.  Subacute appearing LEFT common femoral vein DVT and proximal femoral    vein junction DVT.     He had leg pain and ultrasound of bilateral lower extremities was completed which was positive for new DVT, acute in the right posterior tibial vein and subacute in the left common femoral and proximal femoral vein junction as noted above.  He was initiated on Xarelto which she will plan to take for 3 months.  Plavix  has since been placed on hold due to significant hematuria and questionable melena.     Psychophysiological Insomnia    He notes significant challenges with insomnia currently related to right leg/hip cramps and pain. He is planning to undergo a sleep study due to significantly abnormal overnight pulse oximetry. He has ongoing daytime somnolence which has been present since his stroke/cancer/ICU stay in 2018.    During hospitalization June 2022 he had sundowning and was given Seroquel, stopped this when he was discharged home.  He was also given trazodone which he is only used once and now has it available as needed.  Has been utilizing gabapentin and tramadol due to chronic low back nerve pain and more recent episode of severe left elbow pain on his contracted left upper extremity.     Hypomagnesemia    Etiology uncertain and noted at [facility] during acute hospital stay in the summer of 2017.    Magnesium level 1.3 on June 23, 2022 lab draw.  He is taking magnesium oxide 800 mg twice daily.     Iron Deficiency Anemia    He has had anemia for the past few years.  Hemoglobin ranges from mid nines to mid 13's.  He is on Plavix for history of stroke, currently on hold while taking Xarelto for acute DVT due to hematuria with worsening anemia.     Hemoglobin 9.3 on June 23rd, was 11.1 on June 17th at time of discharge from rehab hospital.     (HCC) Hypertension associated with diabetes    He has a history of hypertension.  Current regimen includes metoprolol succinate 100 mg daily, losartan 50 mg twice daily.     Previously on alfuzosin which has since been stopped following TURP, now on tamsulosin. Has been on amlodipine off/on, but with more recent hypotension we have decided to hold it. Spironolactone used in the past but stopped with GFR worsened.    He follows with both cardiology, urology, nephrology.     Type 2 Diabetes Mellitus, With Long-Term Current Use of Insulin (Hcc)     Latest Reference Range &  Units 05/18/22 14:58   Glycohemoglobin 0.0 - 5.6 % 6.6 !     Micro Alb Creat Ratio 0 - 30 mg/g 42 High         Longstanding history of type 2 diabetes on insulin.  Previously followed with endocrinology, Dr. Rasheed.     Current regimen includes Toujeo 5-12 units daily, Humalog 5 units for sugars between 101-150, increase by 2 units if greater than 150.  Correction dosage is 2: 50 greater than 150.    Holding Trulicity since most recent hospitalization in June 2022.    Complicated by retinal involvement, neuropathy, CKDIII-IV, and microalbuminuria.           Current medicines (including reconciliation performed today)  Current Outpatient Medications   Medication Sig Dispense Refill   • magnesium oxide (MAG-OX) 400 MG Tab tablet Take 400 mg by mouth every day.     • Potassium Chloride CR (MICRO-K) 8 MEQ Cap CR capsule Take 8 mEq by mouth every day.     • omeprazole (PRILOSEC) 20 MG delayed-release capsule Take 1 Capsule by mouth every day. 30 Capsule 2   • rivaroxaban (XARELTO) 15 MG Tab tablet Take 1 Tablet by mouth 2 times a day with meals. 42 Tablet 0   • traMADol (ULTRAM) 50 MG Tab Take 1 Tablet by mouth every 6 hours as needed for Moderate Pain or Severe Pain for up to 14 days. 42 Tablet 0   • tamsulosin (FLOMAX) 0.4 MG capsule Take 1 Capsule by mouth every day. 30 Capsule 2   • traZODone (DESYREL) 50 MG Tab Take 1 Tablet by mouth at bedtime as needed for Sleep. 30 Tablet 3   • allopurinol (ZYLOPRIM) 100 MG Tab TAKE 1 TABLET BY MOUTH EVERY  Tablet 3   • acetaminophen (TYLENOL) 325 MG Tab Take 2 Tablets by mouth every 6 hours as needed for Mild Pain, Moderate Pain or Fever. 30 Tablet 0   • insulin regular (HUMULIN R) 100 Unit/mL Solution Inject 1-6 Units under the skin 4 Times a Day,Before Meals and at Bedtime. 10 mL    • gabapentin (NEURONTIN) 100 MG Cap TAKE 1 TO 3 CAPSULES BY MOUTH AT BEDTIME AS NEEDED FOR PAIN (Patient taking differently: Take 100 mg by mouth every day. TAKE 1 TO 3 CAPSULES BY MOUTH  AT BEDTIME AS NEEDED FOR PAIN) 90 Capsule 3   • Insulin Pen Needle (PEN NEEDLES) 32G X 4 MM Misc 1 Each 5 Times a Day. USE FOR INSULIN SHOTS FIVE TIMES DAILY 500 Each 3   • Insulin Pen Needle 32 G x 4 mm      • Insulin Glargine, 1 Unit Dial, (TOUJEO SOLOSTAR) 300 UNIT/ML Solution Pen-injector Inject 14 Units under the skin every day. Per wife Sliding Scale, per wife gave 22 units on 1/18/2022 1.5 mL 3   • metoprolol SR (TOPROL XL) 100 MG TABLET SR 24 HR Take 1 Tablet by mouth every day. (Patient taking differently: Take 100 mg by mouth at bedtime.) 90 Tablet 3   • losartan (COZAAR) 50 MG Tab Take 1 Tablet by mouth 2 times a day. 180 Tablet 3   • fenofibrate (TRICOR) 145 MG Tab Take 1 Tablet by mouth every day. (Patient taking differently: Take 145 mg by mouth every evening.) 90 Tablet 3   • atorvastatin (LIPITOR) 80 MG tablet Take 1 Tablet by mouth every evening. 90 Tablet 3   • fluoxetine (PROZAC) 40 MG capsule TAKE 1 CAPSULE BY MOUTH EVERY DAY (Patient taking differently: Take 40 mg by mouth every day.) 90 capsule 3   • fenofibrate (TRICOR) 145 MG Tab fenofibrate     • [START ON 7/9/2022] rivaroxaban (XARELTO) 20 MG Tab tablet Take 1 Tablet by mouth with dinner. 30 Tablet 2   • ondansetron (ZOFRAN ODT) 4 MG TABLET DISPERSIBLE Take 1 Tablet by mouth every 8 hours as needed for Nausea. 20 Tablet 1     No current facility-administered medications for this visit.       Allergies:   Diphenhydramine, Diphenhydramine hcl, Lorazepam, Ciprofloxacin, and Spironolactone    Social History     Tobacco Use   • Smoking status: Never Smoker   • Smokeless tobacco: Never Used   Vaping Use   • Vaping Use: Never used   Substance Use Topics   • Alcohol use: Never   • Drug use: Never       ROS:  Weakness, lethargy, left elbow pain, decreased mobility    Objective:     Vitals:    06/27/22 0909   BP: (!) 92/46   BP Location: Left arm   Patient Position: Sitting   BP Cuff Size: Adult   Pulse: (!) 59   Resp: 16   Temp: 36 °C (96.8 °F)  "  TempSrc: Temporal   SpO2: 96%   Weight: 87.1 kg (192 lb)   Height: 1.778 m (5' 10\")     Body mass index is 27.55 kg/m².    Physical Exam:  Physical Exam  Constitutional:       Comments: Chronically ill appearing, tired   Eyes:      General: No scleral icterus.     Conjunctiva/sclera: Conjunctivae normal.   Cardiovascular:      Rate and Rhythm: Normal rate and regular rhythm.      Pulses: Normal pulses.   Pulmonary:      Effort: Pulmonary effort is normal. No respiratory distress.      Breath sounds: Normal breath sounds. No wheezing.   Abdominal:      General: Bowel sounds are normal. There is no distension.      Palpations: Abdomen is soft.      Tenderness: There is no abdominal tenderness.   Musculoskeletal:      Right lower leg: No edema.      Left lower leg: No edema.      Comments: Pain left elbow, contract contracture LUE   Neurological:      Gait: Gait abnormal.      Comments: Wheelchair for ambulation, 2 assist for transfers in/out of car   Psychiatric:         Mood and Affect: Mood normal.         Behavior: Behavior normal.         Thought Content: Thought content normal.         Judgment: Judgment normal.         Assessment and Plan:     Problem List Items Addressed This Visit     Type 2 diabetes mellitus, with long-term current use of insulin (HCC)     Chronic and ongoing problem.  Trulicity was held while he was inpatient recently.  He is just trying to get his appetite back so would prefer holding off adding back Trulicity in case it would worsen his appetite or cause any GI side effects.  His wife monitors his blood sugars regularly and will reach out if she feels like they start to get too high.  Generally he is low 100s fasting and a maximum of 250 postprandial after dinner.           (HCC) Hypertension associated with diabetes     Chronic and ongoing problem.  There have been multiple medication adjustments due to hypotension.  Recommend stopping amlodipine which I have stopped this summer due to " overtreatment however is added back when he was in the rehab hospital.  Recommend he continue on metoprolol succinate 100 mg daily and losartan 50 mg twice daily and continue follow-up with cardiology as recommended.  They are following blood pressure readings at home and will let me know if he develops any high levels.           Relevant Medications    fenofibrate (TRICOR) 145 MG Tab    Other Relevant Orders    Comp Metabolic Panel    Hypomagnesemia     Chronic and ongoing problem.  Has been running chronically low, could be related to PPI use.  He follows with nephrology, will check in with Dr. Jarvis if she has any ideas to keep his levels up.  Continue magnesium oxide 800 mg twice daily in the meantime.           Relevant Orders    MAGNESIUM    Iron deficiency anemia     He has recent onset of anemia worsened from baseline likely due to hematuria from addition of Xarelto and the Plavix related to acute DVT.  He talked to cardiology and medicine stop the Plavix and the hematuria has lessened some.  We will plan to trend weekly with blood draws from home health to ensure stability.  He will need the Xarelto for at least 3 months due to a DVT.           Relevant Orders    CBC WITH DIFFERENTIAL    Psychophysiological insomnia     Chronic and improved problem, sleep is better now that he is home and discharge from the rehab hospital.  We will discontinue the Seroquel and he will use the trazodone just as needed.  As long as he uses gabapentin at night his pain is adequately controlled to sleep.  Continue with Tylenol and tramadol as well for decompensated left elbow pain.           Acute deep vein thrombosis (DVT) of RLE and partial DVT of LLE     New and decompensated problem during recent hospitalization, provoked during rehab stay.  Now on Xarelto with plan to take for at least 90 days, Plavix was stopped due to hematuria and melena.  We will continue trending blood counts weekly due to worsening anemia.  Will  likely need to recheck ultrasound to ensure resolution before stop anticoagulation.           Relevant Medications    fenofibrate (TRICOR) 145 MG Tab    Hospital discharge follow-up     He had 3-day hospitalization followed by 3-week rehab stay as detailed above in HPI.  Recommendations today include discontinuation of Plavix while on Xarelto, this will likely continue for 3 months due to DVTs in bilateral lower extremities.  He developed these while on prophylactic dosing of Xarelto, will likely want ultrasound of the lower extremities before stopping the Xarelto to ensure resolution.  Recommend he stop the amlodipine, blood pressure is low in clinic today for me and has been low for the patient at home.  Continue losartan and metoprolol.  Left elbow pain is intense, advised they schedule tramadol twice daily and continue Tylenol during the day and gabapentin at night, will reach out to Dr. Huff of physiatry to see if we need to get some updated imaging of the left elbow.  Pain started after serial casting during his 3-week rehab stay.  No further diarrhea, completed course of oral vancomycin for C. difficile.  Insomnia and sundowning have improved since he has returned home, discontinue Seroquel and use trazodone only as needed.  For diabetes continue holding Trulicity until he gets more consistency with his appetite, can adjust his basal and bolus insulin in the meantime, fasting levels are currently at goal and he has slight highs with postprandial after dinner in the mid 200s which is tolerable at this time.  Recheck blood counts and metabolic panel this week.  He did have downtrend of blood counts due to hematuria and melena related to, will commit to use of Plavix and Xarelto, Plavix is now on hold until Xarelto completes as above.  Will reach out to nephrology regarding his hypomagnesemia and hypocalcemia to see if they have any recommendations to help improve these values to support muscle function.            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  Patient relates pain in the left elbow starting after serial casting.  Casting was done to try to improve contracture in the left upper extremity.  Pain is located completely at the site of the elbow.  The initial casting did not cause any discomfort but the second wave was quite painful and that pain has persisted.  Advised him to continue with Tylenol throughout the day, gabapentin at nighttime, and schedule the tramadol twice daily and keep me updated.  We will send a message to Dr. Huff to see when he can arrange follow-up and if any additional imaging would be needed to look into additional etiologies to his pain.                 - Chart and discharge summary were reviewed.   - Hospitalization and results reviewed with patient.   - Medications reviewed including instructions regarding high risk medications, dosing and side effects.  - Recommended Services: Referral to Home Health  - Advance directive/POLST on file?  Yes    Follow-up:Return in about 3 months (around 9/27/2022).    Face-to-face transitional care management services with HIGH (today's visit is within days post discharge & LACE+ score 59+) medical decision complexity were provided.     LACE+ Historical Score Over Time (0-28: Low, 29-58: Medium, 59+: High): 68

## 2022-06-22 NOTE — PROGRESS NOTES
Given bleeding risk is likely higher than CVA risk at this time, I recommend continuing full dose xarelto monotherapy at this time with no aspirin or plavix. Reassess at our upcoming appointment in November or should he be taken off xarelto, plavix should be restarted.

## 2022-06-23 DIAGNOSIS — R31.0 GROSS HEMATURIA: ICD-10-CM

## 2022-06-23 DIAGNOSIS — E83.42 HYPOMAGNESEMIA: ICD-10-CM

## 2022-06-23 DIAGNOSIS — D64.9 NORMOCYTIC ANEMIA: ICD-10-CM

## 2022-06-23 DIAGNOSIS — E87.1 HYPONATREMIA: ICD-10-CM

## 2022-06-27 ENCOUNTER — TELEPHONE (OUTPATIENT)
Dept: PHYSICAL MEDICINE AND REHAB | Facility: REHABILITATION | Age: 77
End: 2022-06-27
Payer: MEDICARE

## 2022-06-27 ENCOUNTER — OFFICE VISIT (OUTPATIENT)
Dept: MEDICAL GROUP | Facility: PHYSICIAN GROUP | Age: 77
End: 2022-06-27
Payer: MEDICARE

## 2022-06-27 VITALS
BODY MASS INDEX: 27.49 KG/M2 | OXYGEN SATURATION: 96 % | WEIGHT: 192 LBS | RESPIRATION RATE: 16 BRPM | HEART RATE: 59 BPM | DIASTOLIC BLOOD PRESSURE: 46 MMHG | TEMPERATURE: 96.8 F | HEIGHT: 70 IN | SYSTOLIC BLOOD PRESSURE: 92 MMHG

## 2022-06-27 DIAGNOSIS — I15.2 HYPERTENSION ASSOCIATED WITH DIABETES (HCC): ICD-10-CM

## 2022-06-27 DIAGNOSIS — Z79.4 TYPE 2 DIABETES MELLITUS WITH HYPERGLYCEMIA, WITH LONG-TERM CURRENT USE OF INSULIN (HCC): ICD-10-CM

## 2022-06-27 DIAGNOSIS — M62.422 CONTRACTURE OF MUSCLE OF LEFT UPPER ARM: ICD-10-CM

## 2022-06-27 DIAGNOSIS — E11.65 TYPE 2 DIABETES MELLITUS WITH HYPERGLYCEMIA, WITH LONG-TERM CURRENT USE OF INSULIN (HCC): ICD-10-CM

## 2022-06-27 DIAGNOSIS — D50.0 IRON DEFICIENCY ANEMIA DUE TO CHRONIC BLOOD LOSS: ICD-10-CM

## 2022-06-27 DIAGNOSIS — E83.42 HYPOMAGNESEMIA: ICD-10-CM

## 2022-06-27 DIAGNOSIS — F51.04 PSYCHOPHYSIOLOGICAL INSOMNIA: ICD-10-CM

## 2022-06-27 DIAGNOSIS — I82.401 ACUTE DEEP VEIN THROMBOSIS (DVT) OF RIGHT LOWER EXTREMITY, UNSPECIFIED VEIN (HCC): ICD-10-CM

## 2022-06-27 DIAGNOSIS — E11.59 HYPERTENSION ASSOCIATED WITH DIABETES (HCC): ICD-10-CM

## 2022-06-27 DIAGNOSIS — Z09 HOSPITAL DISCHARGE FOLLOW-UP: ICD-10-CM

## 2022-06-27 PROBLEM — R13.10 DYSPHAGIA: Status: RESOLVED | Noted: 2021-03-15 | Resolved: 2022-06-27

## 2022-06-27 PROBLEM — E86.0 DEHYDRATION: Status: RESOLVED | Noted: 2022-05-28 | Resolved: 2022-06-27

## 2022-06-27 PROBLEM — H00.015 HORDEOLUM EXTERNUM OF LEFT LOWER EYELID: Status: RESOLVED | Noted: 2021-09-14 | Resolved: 2022-06-27

## 2022-06-27 PROBLEM — S91.209A TOENAIL AVULSION, INITIAL ENCOUNTER: Status: RESOLVED | Noted: 2021-07-12 | Resolved: 2022-06-27

## 2022-06-27 PROBLEM — Z20.828 EXPOSURE TO SARS-ASSOCIATED CORONAVIRUS: Status: RESOLVED | Noted: 2021-10-13 | Resolved: 2022-06-27

## 2022-06-27 PROBLEM — R19.7 DIARRHEA: Status: RESOLVED | Noted: 2019-07-02 | Resolved: 2022-06-27

## 2022-06-27 PROBLEM — R11.12 PROJECTILE VOMITING WITH NAUSEA: Status: RESOLVED | Noted: 2022-05-24 | Resolved: 2022-06-27

## 2022-06-27 PROBLEM — G93.40 ACUTE ENCEPHALOPATHY: Status: RESOLVED | Noted: 2022-05-31 | Resolved: 2022-06-27

## 2022-06-27 PROBLEM — N39.0 UTI (URINARY TRACT INFECTION): Status: RESOLVED | Noted: 2022-05-28 | Resolved: 2022-06-27

## 2022-06-27 PROBLEM — N41.0 ACUTE PROSTATITIS: Status: RESOLVED | Noted: 2022-01-13 | Resolved: 2022-06-27

## 2022-06-27 PROBLEM — E87.1 HYPONATREMIA: Status: RESOLVED | Noted: 2022-05-28 | Resolved: 2022-06-27

## 2022-06-27 PROBLEM — N12 PYELONEPHRITIS: Status: RESOLVED | Noted: 2022-05-24 | Resolved: 2022-06-27

## 2022-06-27 PROCEDURE — 99496 TRANSJ CARE MGMT HIGH F2F 7D: CPT | Performed by: INTERNAL MEDICINE

## 2022-06-27 RX ORDER — MAGNESIUM OXIDE 400 MG/1
800 TABLET ORAL 2 TIMES DAILY
COMMUNITY

## 2022-06-27 RX ORDER — POTASSIUM CHLORIDE 600 MG/1
8 CAPSULE, EXTENDED RELEASE ORAL DAILY
COMMUNITY
End: 2022-08-18 | Stop reason: SDUPTHER

## 2022-06-27 RX ORDER — FENOFIBRATE 145 MG/1
TABLET, COATED ORAL
COMMUNITY
End: 2022-08-25

## 2022-06-27 ASSESSMENT — FIBROSIS 4 INDEX: FIB4 SCORE: 1.23

## 2022-06-27 NOTE — ASSESSMENT & PLAN NOTE
Chronic and improved problem, sleep is better now that he is home and discharge from the rehab hospital.  We will discontinue the Seroquel and he will use the trazodone just as needed.  As long as he uses gabapentin at night his pain is adequately controlled to sleep.  Continue with Tylenol and tramadol as well for decompensated left elbow pain.

## 2022-06-27 NOTE — TELEPHONE ENCOUNTER
LVM for patient's wife, Usha, returning her Vm from 6/24 at 12:07. She states pt needs a [facility] apt post discharge.

## 2022-06-27 NOTE — ASSESSMENT & PLAN NOTE
Chronic and ongoing problem.  Has been running chronically low, could be related to PPI use.  He follows with nephrology, will check in with Dr. Jarvis if she has any ideas to keep his levels up.  Continue magnesium oxide 800 mg twice daily in the meantime.

## 2022-06-27 NOTE — ASSESSMENT & PLAN NOTE
He has recent onset of anemia worsened from baseline likely due to hematuria from addition of Xarelto and the Plavix related to acute DVT.  He talked to cardiology and medicine stop the Plavix and the hematuria has lessened some.  We will plan to trend weekly with blood draws from home health to ensure stability.  He will need the Xarelto for at least 3 months due to a DVT.

## 2022-06-27 NOTE — ASSESSMENT & PLAN NOTE
Chronic and ongoing problem.  Trulicity was held while he was inpatient recently.  He is just trying to get his appetite back so would prefer holding off adding back Trulicity in case it would worsen his appetite or cause any GI side effects.  His wife monitors his blood sugars regularly and will reach out if she feels like they start to get too high.  Generally he is low 100s fasting and a maximum of 250 postprandial after dinner.

## 2022-06-28 NOTE — ASSESSMENT & PLAN NOTE
He had 3-day hospitalization followed by 3-week rehab stay as detailed above in HPI.  Recommendations today include discontinuation of Plavix while on Xarelto, this will likely continue for 3 months due to DVTs in bilateral lower extremities.  He developed these while on prophylactic dosing of Xarelto, will likely want ultrasound of the lower extremities before stopping the Xarelto to ensure resolution.  Recommend he stop the amlodipine, blood pressure is low in clinic today for me and has been low for the patient at home.  Continue losartan and metoprolol.  Left elbow pain is intense, advised they schedule tramadol twice daily and continue Tylenol during the day and gabapentin at night, will reach out to Dr. Huff of physiatry to see if we need to get some updated imaging of the left elbow.  Pain started after serial casting during his 3-week rehab stay.  No further diarrhea, completed course of oral vancomycin for C. difficile.  Insomnia and sundowning have improved since he has returned home, discontinue Seroquel and use trazodone only as needed.  For diabetes continue holding Trulicity until he gets more consistency with his appetite, can adjust his basal and bolus insulin in the meantime, fasting levels are currently at goal and he has slight highs with postprandial after dinner in the mid 200s which is tolerable at this time.  Recheck blood counts and metabolic panel this week.  He did have downtrend of blood counts due to hematuria and melena related to, will commit to use of Plavix and Xarelto, Plavix is now on hold until Xarelto completes as above.  Will reach out to nephrology regarding his hypomagnesemia and hypocalcemia to see if they have any recommendations to help improve these values to support muscle function.

## 2022-06-28 NOTE — ASSESSMENT & PLAN NOTE
No decompensated problem.  Patient relates pain in the left elbow starting after serial casting.  Casting was done to try to improve contracture in the left upper extremity.  Pain is located completely at the site of the elbow.  The initial casting did not cause any discomfort but the second wave was quite painful and that pain has persisted.  Advised him to continue with Tylenol throughout the day, gabapentin at nighttime, and schedule the tramadol twice daily and keep me updated.  We will send a message to Dr. Huff to see when he can arrange follow-up and if any additional imaging would be needed to look into additional etiologies to his pain.

## 2022-06-28 NOTE — ASSESSMENT & PLAN NOTE
New and decompensated problem during recent hospitalization, provoked during rehab stay.  Now on Xarelto with plan to take for at least 90 days, Plavix was stopped due to hematuria and melena.  We will continue trending blood counts weekly due to worsening anemia.  Will likely need to recheck ultrasound to ensure resolution before stop anticoagulation.

## 2022-06-29 ENCOUNTER — OFFICE VISIT (OUTPATIENT)
Dept: PHYSICAL MEDICINE AND REHAB | Facility: REHABILITATION | Age: 77
End: 2022-06-29
Payer: MEDICARE

## 2022-06-29 VITALS
SYSTOLIC BLOOD PRESSURE: 110 MMHG | DIASTOLIC BLOOD PRESSURE: 64 MMHG | RESPIRATION RATE: 18 BRPM | OXYGEN SATURATION: 96 % | HEART RATE: 72 BPM

## 2022-06-29 DIAGNOSIS — I69.954 HEMIPLEGIA AND HEMIPARESIS FOLLOWING UNSPECIFIED CEREBROVASCULAR DISEASE AFFECTING LEFT NON-DOMINANT SIDE (HCC): Primary | ICD-10-CM

## 2022-06-29 DIAGNOSIS — Z86.73 HISTORY OF STROKE: ICD-10-CM

## 2022-06-29 DIAGNOSIS — R53.81 PHYSICAL DECONDITIONING: ICD-10-CM

## 2022-06-29 DIAGNOSIS — M25.522 LEFT ELBOW PAIN: ICD-10-CM

## 2022-06-29 PROCEDURE — 76942 ECHO GUIDE FOR BIOPSY: CPT | Performed by: PHYSICAL MEDICINE & REHABILITATION

## 2022-06-29 PROCEDURE — 99214 OFFICE O/P EST MOD 30 MIN: CPT | Mod: 25 | Performed by: PHYSICAL MEDICINE & REHABILITATION

## 2022-06-29 PROCEDURE — 64642 CHEMODENERV 1 EXTREMITY 1-4: CPT | Performed by: PHYSICAL MEDICINE & REHABILITATION

## 2022-06-29 NOTE — PROGRESS NOTES
[facility]  [facility]  [address] [place] [address]  Ph: [phone]    FOLLOW UP OUTPATIENT EVALUATION    Patient Name: Av Bob   Patient : 1945  Patient Age: 77 y.o.     Examining Physician: Dr. Lorie Huff, DO    PM&R History to Date - Background Information:  Dates of Admission/Discharge to ARU: Larry Williamson - pavan 2017; [facility] 2022- 2022    SUBJECTIVE:   Patient Identification: Av Bob is a 77 y.o. 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 Elbow Pain.     History of Present Illness:   - Getting a lot of elbow pain after casting. The whole arm cast was helpful and loosened the arm, but then the elbow cast was uncomfortable from the get go. Had on for 3 days.   - Is now on Xarelto for DVTs. Has hematuria now.   - Has had a lot of issues since we last met. We were supposed meet in Dec, but we had to cancel on him and then has had hospitalizations since that time. Had TURP, UTI, etc.   - Is currently getting home health. Thinks might be improving a little bit.   - No longer taking the Baclofen. Had cognitive side effects.   - Taking Gabapentin 200mg QHS.  - Taking Tramadol 50mg BID (11:30 + 1000mg Tylenol and around 1900 at night)  - Av is a 3 person transfer at this point.  Very difficult to get to and from appointments.    Review of Systems:  All other pertinent positive review of systems are noted above in HPI.   All other systems reviewed and are negative.    Past Medical History:  Past Medical History:   Diagnosis Date   • UTI (urinary tract infection) 2022   •  Pyelonephritis 5/24/2022    Av had abrupt onset of illness starting on Sunday.  He felt unwell and then had projectile vomiting x3 episodes after dinner that evening.  He had associated nausea but no overt abdominal pain.  He has continued to have anorexia and is having difficulty with his p.o. intake.  He did get in some fruit in a month and yesterday and about 50 to 60 ounces of water.  He has chronic urinary retention   • Acute prostatitis 1/13/2022    New problem of prostatitis identified by dispatch health when patient developed hematuria with rectal pain.  He followed up with his urology PA and was placed back on Bactrim.  He was recently on Bactrim and has a history of recurrent urinary tract infections related to neurogenic bladder from prior stroke.  Rectal pain is dissipated however he continues to have hematuria.  He had associated CATA o   • Exposure to SARS-associated coronavirus 10/13/2021    He reports viral illness last week with runny nose, congestion, productive cough, and wheezing.  He went to a play of his grandson and may have been exposed to Covid.  He was feeling very bad last Friday and over the weekend and now is at least 50% better.   • Hordeolum externum of left lower eyelid 9/14/2021    He reports to clinic with 3 weeks of erythema and pain of the left lower eyelid. No clear inciting cause. Reports no globe pain. Lower > upper eyelid swelling and erythema. After 1.5 weeks had drainage of purulence from the lower medial eyelid. No photosensitivity. Using warm compresses. No changes in visual acuity. Saw retinal specialist around day 1 or 2 of symptoms who felt it could be related    • Toenail avulsion, initial encounter- left foot 3rd digit 7/12/2021    They report that his toenail spontaneously came off last week.  He does not recall specific injury or any pain.  His significant other saw the nail on the floor with dried blood on his foot.  She cleaned the wound and covered it with  gauze.  On follow-up in clinic today the gauze has stuck to the wound bed but with saline was fairly easy to remove the dried gauze.  There was scant amount of bright   • Pain 06/30/2021    right leg foot   • Hypertension 06/30/2021    pt states well controlled on meds   • Dysphagia 3/15/2021    He reports progressive worsening of his swallow function.  He notices at least once per week he is choking and coughing, can be with pills or can be with food.  The episodes are quite frightening and he takes a bit of time for him to recover.  He has a prior history of stroke and recalls working with speech-language pathology at that time but does not remember if he did any formal esophagrams or s   • Roberto hematuria 1/29/2020   • Renal cyst 1/29/2020   • Influenza A 1/26/2020   • Leg edema, right 1/22/2020   • Acute on chronic renal failure (HCC) 1/21/2020   • Renal mass 1/21/2020   • Hydronephrosis 1/20/2020   • History of renal calculi 11/19/2019   • Diarrhea 7/2/2019   • Acute cystitis without hematuria 6/30/2019   • Muscle strain of right gluteal region 5/20/2019   • Left hand weakness 5/20/2019   • ([facility]ly [facility]) Chronic ulcer of right lower extremity with fat layer exposed 12/27/2018   • Enterococcal septicemia ([facility]) 8/12/2017   • Hypomagnesemia 08/12/2017    etiology uncertain   • NSTEMI (non-ST elevated myocardial infarction) ([facility]) 07/18/2017    complicating UTI with sepsis   • Acute respiratory failure with hypoxia ([facility]) 5/20/2017   • Septic shock ([facility]) 5/20/2017   • Normocytic hypochromic anemia 5/20/2017   • Lymphoma ([facility]) 2/19/2017    Large cell   • Cerebrovascular accident (CVA) ([facility]) 12/30/2016    Left arm weakness  etiology of stroke not established, lymphoma discovered on MRI evaluation of stroke, L hemiparesis much worse after acute infectious illness in mid 2017, but no specific diagnosed recurrent neurological etiology, all at [facility]   • Stage 3b chronic kidney disease ([facility])  8/25/2016     Latest Reference Range & Units 04/29/22 11:14 Bun 8 - 22 mg/dL 33 (H) Creatinine 0.50 - 1.40 mg/dL 1.55 (H) GFR (CKD-EPI) >60 mL/min/1.73 m 2 46 ! [1]  He has a history of chronic kidney disease related to nephrolithiasis, recurrent UTIs, and BPH as well as history of hypertension and diabetes.  He is following with nephrology, Dr. Jarvis and urology, Dr. Barry.  Spironolactone was discontinued due    • Stroke ([facility]) 2016    left sided weakness   • Skin ulcer of calf ([facility]) 2015    Right, Dr. Terry and wound care   • Controlled gout 2014   • Polyneuropathy in diabetes(357.2) 9/11/2013   • Hypokalemia 2012    controlled with combination of ACE inhibitor or ARB plus spironolactone   • Wound of left leg 2012    Requiring surgery and debridment, Dr. Moore   • Nephrolithiasis 2006    right kidney subsequent lithotripsy by Dr. Barry   • CAD (coronary artery disease)     GIOVANNA to RCA in '97, GIOVANNA X2 to LAD and GIOVANNA X2 to OM in '09   • Cancer ([facility])     2017; chemo lympoma non hodgkins lymphoma   • Cataract     dez iol   • Chickenpox    • CKD (chronic kidney disease) stage 3, GFR 30-59 ml/min ([facility])    • Coronary atherosclerosis of native coronary artery     S/P PTCA (percutaneous transluminal coronary angioplasty), RCA, 5/1997, patent on cath 7/10/2009 at the time of interventions on his left anterior descending and circumflex coronary arteries   • Daytime sleepiness    • Depression    • Diabetes ([facility])    • Difficulty swallowing    • Frequent urination    • GERD (gastroesophageal reflux disease)    • Upper sorbian measles    • Insomnia    • Kidney stone    • Mixed hyperlipidemia    • Peripheral vascular disease ([facility])    • Urinary bladder disorder    • Urinary incontinence     pt wears pads   • Weakness       Past Surgical History:   Procedure Laterality Date   • OH INJ LUMBAR/SACRAL,W/ IMAGING  7/27/2021    Procedure: Lumbar L5-S1 interlaminar epidural steroid injection;  Surgeon: Harpal Bennett M.D.;  Location: SURGERY  REHAB PAIN MANAGEMENT;  Service: Pain Management   • FL UPPER GI ENDOSCOPY,DIAGNOSIS N/A 7/21/2021    Procedure: GASTROSCOPY - AND DILATION;  Surgeon: Neville Huerta M.D.;  Location: SURGERY SAME DAY [facility];  Service: Gastroenterology   • FL UPPER GI ENDOSCOPY,BIOPSY N/A 7/21/2021    Procedure: GASTROSCOPY, WITH BIOPSY;  Surgeon: Neville Huerta M.D.;  Location: SURGERY SAME DAY [facility];  Service: Gastroenterology   • LUMBAR TRANSFORAMINAL EPIDURAL STEROID INJECTION Right 3/29/2021    Procedure: RIGHT L3-4 and L4-5 transforaminal epidural steroid injection with fluoroscopic guidance;  Surgeon: Harpal Bennett M.D.;  Location: SURGERY REHAB PAIN MANAGEMENT;  Service: Pain Management   • LUMBAR TRANSFORAMINAL EPIDURAL STEROID INJECTION Right 11/2/2020    Procedure: INJECTION, STEROID, SPINE, LUMBAR, EPIDURAL, TRANSFORAMINAL APPROACH;  Surgeon: Harpal Bennett M.D.;  Location: SURGERY REHAB PAIN MANAGEMENT;  Service: Pain Management   • CYSTOSCOPY STENT PLACEMENT Left 2/12/2018    Procedure: CYSTOSCOPY  ;  Surgeon: Rey Barry M.D.;  Location: [facility];  Service: Urology   • URETEROSCOPY Left 2/12/2018    Procedure: URETEROSCOPY- FLEXIBLE  ;  Surgeon: Rey Barry M.D.;  Location: [facility];  Service: Urology   • LASERTRIPSY Left 2/12/2018    Procedure: LASERTRIPSY - LITHO  ;  Surgeon: Rey Barry M.D.;  Location: [facility];  Service: Urology   • WOUND CLOSURE GENERAL  4/3/2012    Performed by GERHARD GILBERT at SURGERY SAME DAY [facility] ORS   • ZZZ CARDIAC CATH  2009    Stents to LAD, Om   • DAGOBERTO BY LAPAROSCOPY  1998   • ANGIOPLASTY  1997    RCA followed by other stents as noted above.    • ZZZ CARDIAC CATH  1997    stent RCA   • CATARACT EXTRACTION WITH IOL      bilateral   • LITHOTRIPSY     • TONSILLECTOMY     • TONSILLECTOMY AND ADENOIDECTOMY          Current Outpatient Medications:   •  fenofibrate (TRICOR) 145 MG Tab, fenofibrate, Disp: , Rfl:   •  magnesium  oxide (MAG-OX) 400 MG Tab tablet, Take 400 mg by mouth every day., Disp: , Rfl:   •  Potassium Chloride CR (MICRO-K) 8 MEQ Cap CR capsule, Take 8 mEq by mouth every day., Disp: , Rfl:   •  omeprazole (PRILOSEC) 20 MG delayed-release capsule, Take 1 Capsule by mouth every day., Disp: 30 Capsule, Rfl: 2  •  rivaroxaban (XARELTO) 15 MG Tab tablet, Take 1 Tablet by mouth 2 times a day with meals., Disp: 42 Tablet, Rfl: 0  •  traMADol (ULTRAM) 50 MG Tab, Take 1 Tablet by mouth every 6 hours as needed for Moderate Pain or Severe Pain for up to 14 days., Disp: 42 Tablet, Rfl: 0  •  tamsulosin (FLOMAX) 0.4 MG capsule, Take 1 Capsule by mouth every day., Disp: 30 Capsule, Rfl: 2  •  traZODone (DESYREL) 50 MG Tab, Take 1 Tablet by mouth at bedtime as needed for Sleep., Disp: 30 Tablet, Rfl: 3  •  allopurinol (ZYLOPRIM) 100 MG Tab, TAKE 1 TABLET BY MOUTH EVERY DAY, Disp: 100 Tablet, Rfl: 3  •  acetaminophen (TYLENOL) 325 MG Tab, Take 2 Tablets by mouth every 6 hours as needed for Mild Pain, Moderate Pain or Fever., Disp: 30 Tablet, Rfl: 0  •  insulin regular (HUMULIN R) 100 Unit/mL Solution, Inject 1-6 Units under the skin 4 Times a Day,Before Meals and at Bedtime., Disp: 10 mL, Rfl:   •  ondansetron (ZOFRAN ODT) 4 MG TABLET DISPERSIBLE, Take 1 Tablet by mouth every 8 hours as needed for Nausea., Disp: 20 Tablet, Rfl: 1  •  gabapentin (NEURONTIN) 100 MG Cap, TAKE 1 TO 3 CAPSULES BY MOUTH AT BEDTIME AS NEEDED FOR PAIN (Patient taking differently: Take 100 mg by mouth every day. TAKE 1 TO 3 CAPSULES BY MOUTH AT BEDTIME AS NEEDED FOR PAIN), Disp: 90 Capsule, Rfl: 3  •  Insulin Pen Needle (PEN NEEDLES) 32G X 4 MM Misc, 1 Each 5 Times a Day. USE FOR INSULIN SHOTS FIVE TIMES DAILY, Disp: 500 Each, Rfl: 3  •  Insulin Pen Needle 32 G x 4 mm, , Disp: , Rfl:   •  Insulin Glargine, 1 Unit Dial, (TOUJEO SOLOSTAR) 300 UNIT/ML Solution Pen-injector, Inject 14 Units under the skin every day. Per wife Sliding Scale, per wife gave 22 units on  1/18/2022, Disp: 1.5 mL, Rfl: 3  •  metoprolol SR (TOPROL XL) 100 MG TABLET SR 24 HR, Take 1 Tablet by mouth every day. (Patient taking differently: Take 100 mg by mouth at bedtime.), Disp: 90 Tablet, Rfl: 3  •  losartan (COZAAR) 50 MG Tab, Take 1 Tablet by mouth 2 times a day., Disp: 180 Tablet, Rfl: 3  •  fenofibrate (TRICOR) 145 MG Tab, Take 1 Tablet by mouth every day. (Patient taking differently: Take 145 mg by mouth every evening.), Disp: 90 Tablet, Rfl: 3  •  atorvastatin (LIPITOR) 80 MG tablet, Take 1 Tablet by mouth every evening., Disp: 90 Tablet, Rfl: 3  •  fluoxetine (PROZAC) 40 MG capsule, TAKE 1 CAPSULE BY MOUTH EVERY DAY (Patient taking differently: Take 40 mg by mouth every day.), Disp: 90 capsule, Rfl: 3  •  [START ON 7/9/2022] rivaroxaban (XARELTO) 20 MG Tab tablet, Take 1 Tablet by mouth with dinner. (Patient not taking: Reported on 6/29/2022), Disp: 30 Tablet, Rfl: 2  Allergies   Allergen Reactions   • Diphenhydramine Anxiety     Pt is able to tolerate  Mg benadryl with less anxiety   • Diphenhydramine Hcl Anxiety     Pt is able to tolerate  Mg benadryl with less anxiety   • Lorazepam Unspecified     Disorientation   • Ciprofloxacin      Rash,stomach ache   • Spironolactone      Acute kidney injury        Past Social History:  Social History     Socioeconomic History   • Marital status:      Spouse name: Not on file   • Number of children: Not on file   • Years of education: Not on file   • Highest education level: Not on file   Occupational History   • Not on file   Tobacco Use   • Smoking status: Never Smoker   • Smokeless tobacco: Never Used   Vaping Use   • Vaping Use: Never used   Substance and Sexual Activity   • Alcohol use: Never   • Drug use: Never   • Sexual activity: Not Currently     Partners: Female     Comment: , one daughter, 2 grands   Other Topics Concern   •  Service Yes   • Blood Transfusions No   • Caffeine Concern No   • Occupational Exposure No   •  Hobby Hazards No   • Sleep Concern Yes   • Stress Concern No   • Weight Concern No   • Special Diet No   • Back Care No   • Exercise Yes   • Bike Helmet No   • Seat Belt Yes   • Self-Exams Yes   Social History Narrative    Av is from [place], [place] and raised in [place]. He has been in [place] since 2004. 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  He has been  to Usha since 2003, they met through a mutual friend. He has a daughter (Kalina) and a step son ([place]).     Social Determinants of Health     Financial Resource Strain: Not on file   Food Insecurity: Not on file   Transportation Needs: Not on file   Physical Activity: Not on file   Stress: Not on file   Social Connections: Not on file   Intimate Partner Violence: Not on file   Housing Stability: Not on file        Family History:  Family History   Problem Relation Age of Onset   • Heart Disease Father         CAD   • Diabetes Father    • Cancer Mother    • Psychiatric Illness Mother         Depression   • Depression Mother    • Kidney stones Brother    • Heart Disease Brother    • Psychiatric Illness Brother         Depression   • Depression Brother    • Suicide Attempts Other    • Psychiatric Illness Other         autism       Depression and Opioid Screening  PHQ-9:  Depression Screen (PHQ-2/PHQ-9) 6/19/2022 6/20/2022 6/21/2022   PHQ-2 Total Score 0 0 0   PHQ-2 Total Score - - -   PHQ-2 Total Score - - -   PHQ-9 Total Score - - -     Interpretation of PHQ-9 Total Score   Score Severity   1-4 No Depression   5-9 Mild Depression   10-14 Moderate Depression   15-19 Moderately Severe Depression   20-27 Severe Depression     OBJECTIVE:   Vital Signs:  Vitals:    06/29/22 1601   BP: 110/64   Pulse: 72   Resp: 18   SpO2: 96%         Pertinent Labs:  Lab Results   Component Value Date/Time    SODIUM 138 06/21/2022 06:14 AM    POTASSIUM 4.1 06/21/2022 06:14 AM    CHLORIDE 105 06/21/2022 06:14 AM    CO2 23 06/21/2022 06:14 AM    GLUCOSE 131 (H) 06/21/2022 06:14 AM    BUN 24 (H) 06/21/2022 06:14 AM    CREATININE 1.00 06/21/2022 06:14 AM    CREATININE 1.4 05/28/2008 05:42 PM    BUNCREATRAT 10 03/27/2017 02:11 PM       Lab Results   Component Value Date/Time    HBA1C 6.6 (H) 06/01/2022 06:30 AM       Lab Results   Component Value Date/Time    WBC 10.2 06/17/2022 05:36 AM    RBC 3.88 (L) 06/17/2022 05:36 AM    HEMOGLOBIN 11.1 (L) 06/17/2022 05:36 AM    HEMATOCRIT 33.7 (L) 06/17/2022 05:36 AM    MCV 86.9 06/17/2022 05:36 AM    MCH 28.6 06/17/2022 05:36 AM    MCHC 32.9 (L) 06/17/2022 05:36 AM    MPV 9.9 06/17/2022 05:36 AM    NEUTSPOLYS 76.80 (H) 06/17/2022 05:36 AM    LYMPHOCYTES 10.30 (L) 06/17/2022 05:36 AM    MONOCYTES 7.20 06/17/2022 05:36 AM    EOSINOPHILS 4.10 06/17/2022 05:36 AM    BASOPHILS 0.40 06/17/2022 05:36 AM    HYPOCHROMIA 1+ 03/21/2012 01:08 PM       Lab Results   Component Value Date/Time    ASTSGOT 23 06/17/2022 05:36 AM    ALTSGPT 15 06/17/2022 05:36 AM        Physical Exam:   GEN: No apparent distress, appears to have lost weight since we last saw him in person.  HEENT: Head normocephalic, atraumatic.  Sclera nonicteric bilaterally, no ocular discharge appreciated bilaterally.  CV: Extremities warm and well-perfused, no peripheral edema appreciated bilaterally.  PULMONARY: Breathing nonlabored on room air, no respiratory accessory muscle use.  Not requiring supplemental oxygen.  SKIN: No appreciable skin breakdown on exposed areas of skin.  PSYCH: Mood and affect within normal limits.  NEURO: Awake alert.  Conversational.  Logical thought content.  Presents in manual wheelchair.  Left arm with significant spasticity.  Does have some volitional movement of the finger flexors.  He has spasticity at the left elbow 4/4 and  spasticity of the wrist flexors at 3/4.  Pronator teres is 2/4.  MSK: No obvious erythema, swelling, bruising, deformation of the left elbow appreciated.    ASSESSMENT/PLAN: vA Bob is a 77 y.o. male with rehabilitation history significant for R CVA 12/31/16 with residual left hemiparesis, general debility, here for evaluation. The following plan was discussed with the patient who is in agreement.     Visit Diagnoses     ICD-10-CM   1. Left elbow pain  M25.522   2. Hemiplegia and hemiparesis following unspecified cerebrovascular disease affecting left non-dominant side (HCC)  I69.954   3. Physical deconditioning  R53.81   4. History of stroke  Z86.73        Wife, Usha, assists with history.    Rehab/Neuro:   1. 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  States level of debility is out of proportion for how well he recovered after stroke. Even before norovirus virus, while in chemo was able to go to [place], no assistive device. Can use walker a little bit, but fatigues. Also can walk with cane with exercise  or therapy.  2. General debility: ?  CIP/CIM when acutely hospitalized several years ago with norovirus  3. Fatigue: Secondary to debility vs post stroke fatigue vs sleep apnea versus combination of all of the aforementioned.  Has BiPAP, does not tolerate BiPAP.  Also has low T, followed by endocrinology.  -Med management: Now on Xarelto and off of aspirin for left lower extremity DVT 6/18/2022  -Therapy: Continue home health therapy.    Spasticity: Patient had been doing significantly well with left upper extremity spasticity with Botox treatment.  Unfortunately, he was lost to follow-up due to the fact that this clinic had to reschedule him, but then he subsequently had many medical issues which led to hospitalizations.  He was ultimately in acute rehab unit  after sepsis secondary to UTI.  Previously patient had gotten to the point where we were thinking about postponing Botox due to the fact that he had very improved range of motion and volitional control.  -Med management: Not on pharmacologic agents.  Baclofen was retried while he was in acute rehab and he had reduced energy level, motivation, had sedation.  - Same-day procedure decision made to move forward with Botox injections to the left bicep to assist with elbow pain.  See below.  See procedure note.    Okay to continue anticoagulation with Xarelto through the procedure for botulinum toxin injection.  The risks of going off the medication outweigh the risks of doing the procedure on the medication.  I will plan to use 27-gauge needles and ultrasound guidance to avoid all blood vessels during the procedure.  We discussed that while on anticoagulation the patient does have an increased risk of bleeding and hematoma     Botox Plan: I plan to inject to the left upper extremity  Location Botox Amount in Units   Left bicep  400 units   Total Units  400 units       The risks benefits and alternatives to this procedure were discussed and the patient wishes to proceed with the procedure. Risks include but are not limited to damage to surrounding structures, infection, bleeding, worsening of pain which can be permanent, weakness which can be permanent. Benefits include pain relief, improved function. Alternatives includes not doing the procedure.      Nociceptive pain:   1.  Acute onset elbow pain after elbow casting mid June 2022.  - Agree with continued tramadol twice daily in conjunction with Tylenol 1000 mg twice daily during this acute period of pain  - X-ray elbow  - Decision made to proceed with Botox injection today due to the fact that this could assist with pain control if his left elbow were able to have better range of motion and not be as fixed as it is currently creating tension across the  joint.      Follow up: 6 weeks for reevaluation    Total time spent was 33 minutes.  Included in this time is the time spent preparing for the visit including record review, my exam and evaluation, counseling and education regarding that which is aforementioned in the assessment and plan.  Time was spent documenting into patient's electronic health record.  Time was spent ordering the appropriate labs, tests, procedures, referrals, medications. Included this time as the time spent obtaining history from someone other than the patient.  Some of the time included occurred outside of charting time.  Discussion involved the patient and wife.  Not included in this time is procedural time which was separate.  Full time was spent in clinic visit discussing post rehab issues.    Please note that this dictation was created using voice recognition software. I have made every reasonable attempt to correct obvious errors but there may be errors of grammar and content that I may have overlooked prior to finalization of this note.    Dr. Lorie Huff DO, MS  Department of Physical Medicine & Rehabilitation  Neuro Rehabilitation Clinic  [facility]

## 2022-06-30 DIAGNOSIS — E11.65 TYPE 2 DIABETES MELLITUS WITH HYPERGLYCEMIA, WITH LONG-TERM CURRENT USE OF INSULIN (HCC): ICD-10-CM

## 2022-06-30 DIAGNOSIS — N30.00 ACUTE CYSTITIS WITHOUT HEMATURIA: ICD-10-CM

## 2022-06-30 DIAGNOSIS — Z79.4 TYPE 2 DIABETES MELLITUS WITH HYPERGLYCEMIA, WITH LONG-TERM CURRENT USE OF INSULIN (HCC): ICD-10-CM

## 2022-06-30 RX ORDER — INSULIN LISPRO 100 [IU]/ML
5 INJECTION, SOLUTION INTRAVENOUS; SUBCUTANEOUS
Qty: 9 ML | Refills: 3 | Status: SHIPPED | OUTPATIENT
Start: 2022-06-30 | End: 2023-04-24

## 2022-06-30 NOTE — PROCEDURES
[facility]  Physical Medicine & Rehabilitation Clinic  [address] [place] [address]  Ph: [phone]    PROCEDURE NOTE    Procedure: Botulinum Injection  Primary Diagnosis & Secondary Diagnoses:     Visit Diagnoses     ICD-10-CM   1. Hemiplegia and hemiparesis following unspecified cerebrovascular disease affecting left non-dominant side (HCC)  I69.954   2. Left elbow pain  M25.522   3. Physical deconditioning  R53.81   4. History of stroke  Z86.73     Vitals:    06/29/22 1601   BP: 110/64   Pulse: 72   Resp: 18   SpO2: 96%       INFORMED CONSENT   The risks and benefits of the procedure were explained to the patient, and all questions were answered. Benefits of the injection include spasticity reduction by decrease in muscle activation following toxin injection. Adverse effects from toxin injection include but are not limited to: excessive weakness, infection, breathing and/or swallowing difficulty.  Common adverse effects from the injection itself are pain and bruising. The patient demonstrated good understanding of risks and benefits and consents to botulinum toxin injection.     Received botulinum toxin product in last 3 mos: No  Have recently received abx (aminoglycosides): No  Take anti-spasticity medications: No  Take allergy/cold medicine:  No  Take a sleep medicine: No  Lactating/pregnant: No  Known NMJ disease: No  Human albumin allergy: No    Pre-procedure time out was performed.     PROCEDURE:  Usual sterile procedure was observed with sterile prep with chlorhexidine. Ultrasound was used for needle guidance for the injections in the following muscles. A negative aspiration of blood was obtained, and the following was injected into each muscle.    Botox: left upper extremity  Location Botox Amount in Units   Biceps    400 units   Total Units  400 units      Patient did not get as much benefit from lower extremity Botox at last visit so today we decided to increase the number of muscles targeted  in the left upper extremity for maximal benefit.    Injection sites were cleaned with alcohol and band aid was applied afterwards.  The patient tolerated the procedure well.  There were no complications.  The images were uploaded for permanent storage    MEDICATION USED:  200 units of botulinum toxin type A, mixed with 2mL of sterile, preservative-free normal saline in two 1cc syringes, for a total of 100 units in 1 mL. Repeated for other vial.    Total units injected: 400 units  Total units discarded: 0 units    See MAR for Botox details.     Counseling: Pt was instructed to seek immediate medical attention if fever, difficulty breathing, difficulty swallowing, or other concerning sxs arise. RTC in 2-4 weeks to investigate for effect at peak.    Additional Plan:   -Continue home health therapy    SAMPLES USED FOR SAME DAY PROCEDURE - patient is very difficult transfer, transportation is extremely difficult.  This procedure necessary to see if that will assist with pain control so he can improve at home.  He will continue home health after Botox injection today.    BOTOX 200 unit vials (2 vials)  NDC 8298-7190-72  Lot [id number]  Exp 10/2024    Dr. Lorie Huff DO, MS  Department of Physical Medicine & Rehabilitation  [facility] Rehabilitation [facility]  [facility]

## 2022-07-08 DIAGNOSIS — E83.42 HYPOMAGNESEMIA: ICD-10-CM

## 2022-07-08 DIAGNOSIS — I10 ESSENTIAL HYPERTENSION, BENIGN: ICD-10-CM

## 2022-07-11 ENCOUNTER — TELEPHONE (OUTPATIENT)
Dept: MEDICAL GROUP | Facility: PHYSICIAN GROUP | Age: 77
End: 2022-07-11
Payer: MEDICARE

## 2022-07-11 NOTE — TELEPHONE ENCOUNTER
1. Caller Name: Av Landakp                          Call Back Number: [phone] (home)         How would the patient prefer to be contacted with a response: Phone call OK to leave a detailed message    Labs drawn today will ask for labs from [facility] on Wednesday morning

## 2022-07-14 ENCOUNTER — APPOINTMENT (OUTPATIENT)
Dept: RADIOLOGY | Facility: MEDICAL CENTER | Age: 77
End: 2022-07-14
Attending: EMERGENCY MEDICINE
Payer: MEDICARE

## 2022-07-14 ENCOUNTER — HOSPITAL ENCOUNTER (EMERGENCY)
Facility: MEDICAL CENTER | Age: 77
End: 2022-07-14
Attending: EMERGENCY MEDICINE
Payer: MEDICARE

## 2022-07-14 VITALS
OXYGEN SATURATION: 97 % | HEIGHT: 72 IN | WEIGHT: 192 LBS | HEART RATE: 72 BPM | DIASTOLIC BLOOD PRESSURE: 80 MMHG | RESPIRATION RATE: 16 BRPM | BODY MASS INDEX: 26.01 KG/M2 | TEMPERATURE: 97.8 F | SYSTOLIC BLOOD PRESSURE: 123 MMHG

## 2022-07-14 DIAGNOSIS — R31.0 GROSS HEMATURIA: ICD-10-CM

## 2022-07-14 DIAGNOSIS — N28.1 RENAL CYST: ICD-10-CM

## 2022-07-14 DIAGNOSIS — Z79.01 ANTICOAGULATED: ICD-10-CM

## 2022-07-14 LAB
ALBUMIN SERPL BCP-MCNC: 2.7 G/DL (ref 3.2–4.9)
ALBUMIN/GLOB SERPL: 0.9 G/DL
ALP SERPL-CCNC: 132 U/L (ref 30–99)
ALT SERPL-CCNC: 8 U/L (ref 2–50)
ANION GAP SERPL CALC-SCNC: 10 MMOL/L (ref 7–16)
APPEARANCE UR: ABNORMAL
AST SERPL-CCNC: 19 U/L (ref 12–45)
BACTERIA #/AREA URNS HPF: ABNORMAL /HPF
BASOPHILS # BLD AUTO: 0.2 % (ref 0–1.8)
BASOPHILS # BLD: 0.02 K/UL (ref 0–0.12)
BILIRUB SERPL-MCNC: 0.3 MG/DL (ref 0.1–1.5)
BILIRUB UR QL STRIP.AUTO: ABNORMAL
BUN SERPL-MCNC: 27 MG/DL (ref 8–22)
CALCIUM SERPL-MCNC: 8.4 MG/DL (ref 8.4–10.2)
CHLORIDE SERPL-SCNC: 99 MMOL/L (ref 96–112)
CO2 SERPL-SCNC: 22 MMOL/L (ref 20–33)
COLOR UR: ABNORMAL
CREAT SERPL-MCNC: 1.46 MG/DL (ref 0.5–1.4)
EOSINOPHIL # BLD AUTO: 0.19 K/UL (ref 0–0.51)
EOSINOPHIL NFR BLD: 1.5 % (ref 0–6.9)
ERYTHROCYTE [DISTWIDTH] IN BLOOD BY AUTOMATED COUNT: 44 FL (ref 35.9–50)
GFR SERPLBLD CREATININE-BSD FMLA CKD-EPI: 50 ML/MIN/1.73 M 2
GLOBULIN SER CALC-MCNC: 2.9 G/DL (ref 1.9–3.5)
GLUCOSE SERPL-MCNC: 177 MG/DL (ref 65–99)
GLUCOSE UR STRIP.AUTO-MCNC: NEGATIVE MG/DL
HCT VFR BLD AUTO: 32.6 % (ref 42–52)
HGB BLD-MCNC: 10.3 G/DL (ref 14–18)
IMM GRANULOCYTES # BLD AUTO: 0.08 K/UL (ref 0–0.11)
IMM GRANULOCYTES NFR BLD AUTO: 0.7 % (ref 0–0.9)
KETONES UR STRIP.AUTO-MCNC: NEGATIVE MG/DL
LACTATE BLD-SCNC: 1.5 MMOL/L (ref 0.5–2)
LEUKOCYTE ESTERASE UR QL STRIP.AUTO: ABNORMAL
LIPASE SERPL-CCNC: 69 U/L (ref 7–58)
LYMPHOCYTES # BLD AUTO: 0.89 K/UL (ref 1–4.8)
LYMPHOCYTES NFR BLD: 7.2 % (ref 22–41)
MCH RBC QN AUTO: 26.7 PG (ref 27–33)
MCHC RBC AUTO-ENTMCNC: 31.6 G/DL (ref 33.7–35.3)
MCV RBC AUTO: 84.5 FL (ref 81.4–97.8)
MICRO URNS: ABNORMAL
MONOCYTES # BLD AUTO: 0.7 K/UL (ref 0–0.85)
MONOCYTES NFR BLD AUTO: 5.7 % (ref 0–13.4)
NEUTROPHILS # BLD AUTO: 10.42 K/UL (ref 1.82–7.42)
NEUTROPHILS NFR BLD: 84.7 % (ref 44–72)
NITRITE UR QL STRIP.AUTO: NEGATIVE
NRBC # BLD AUTO: 0 K/UL
NRBC BLD-RTO: 0 /100 WBC
PH UR STRIP.AUTO: 7 [PH] (ref 5–8)
PLATELET # BLD AUTO: 482 K/UL (ref 164–446)
PMV BLD AUTO: 9.8 FL (ref 9–12.9)
POTASSIUM SERPL-SCNC: 4.8 MMOL/L (ref 3.6–5.5)
PROT SERPL-MCNC: 5.6 G/DL (ref 6–8.2)
PROT UR QL STRIP: 100 MG/DL
RBC # BLD AUTO: 3.86 M/UL (ref 4.7–6.1)
RBC # URNS HPF: ABNORMAL /HPF
RBC UR QL AUTO: ABNORMAL
SODIUM SERPL-SCNC: 131 MMOL/L (ref 135–145)
SP GR UR STRIP.AUTO: 1.01
WBC # BLD AUTO: 12.3 K/UL (ref 4.8–10.8)
WBC #/AREA URNS HPF: ABNORMAL /HPF

## 2022-07-14 PROCEDURE — 83605 ASSAY OF LACTIC ACID: CPT

## 2022-07-14 PROCEDURE — 85025 COMPLETE CBC W/AUTO DIFF WBC: CPT

## 2022-07-14 PROCEDURE — 80053 COMPREHEN METABOLIC PANEL: CPT

## 2022-07-14 PROCEDURE — 81001 URINALYSIS AUTO W/SCOPE: CPT

## 2022-07-14 PROCEDURE — 99284 EMERGENCY DEPT VISIT MOD MDM: CPT | Mod: 25

## 2022-07-14 PROCEDURE — 87086 URINE CULTURE/COLONY COUNT: CPT

## 2022-07-14 PROCEDURE — 87106 FUNGI IDENTIFICATION YEAST: CPT

## 2022-07-14 PROCEDURE — 36415 COLL VENOUS BLD VENIPUNCTURE: CPT

## 2022-07-14 PROCEDURE — 83690 ASSAY OF LIPASE: CPT

## 2022-07-14 PROCEDURE — 87040 BLOOD CULTURE FOR BACTERIA: CPT

## 2022-07-14 PROCEDURE — 74176 CT ABD & PELVIS W/O CONTRAST: CPT | Mod: MH

## 2022-07-14 ASSESSMENT — FIBROSIS 4 INDEX: FIB4 SCORE: 1.23

## 2022-07-14 NOTE — ED PROVIDER NOTES
ED Physician Note    Chief Concern:   Laboratory abnormalities    HPI:  Av Bob is a very pleasant 77-year-old gentleman who presents to the emergency department today for evaluation after he was sent by his primary care clinic for lab abnormalities on routine screening blood work.  He has a past medical history significant for DVTs, he also has a history of Hodgkin's lymphoma in remission, not currently on chemotherapy.  Anticoagulation was initiated a few months ago, since beginning that medication he has noticed persistent hematuria.  He is followed by Dr. Barry with [facility] [place], states that he does seem to have colonized urine, and is typically not treated for urinary tract infection unless he develops symptoms.  He is incontinent at baseline, and does not always have dysuria.  Due to the hematuria, his hemoglobin has been monitored weekly, he went to [facility] and had labs done that showed a hemoglobin of 10.6 which family reports is not any significant change.  However his white blood count 2 days ago was elevated to 16.7, which is unusual for him.  He is not had any associated fevers, over the past 2 to 3 days he had some very mild nausea as well as a sensation of stomach discomfort or upset, though no true abdominal pain.  This is described as diffuse and nonradiating.  He has had no associated chest pain, no dysuria, no noticeable urinary frequency.  He has no headaches, no neck or back pain, no abnormal rashes or lesions.  He is unable to identify any reliably exacerbating or alleviating factors.    Review of Systems:  See HPI for pertinent positives and negatives. All other systems negative.    Past Medical History:   has a past medical history of (HCC) Chronic ulcer of right lower extremity with fat layer exposed (12/27/2018), Acute cystitis without hematuria (6/30/2019), Acute on chronic renal failure (HCC) (1/21/2020), Acute prostatitis (1/13/2022), Acute respiratory failure with hypoxia  ([facility]) (5/20/2017), CAD (coronary artery disease), Cancer ([facility]), Cataract, Cerebrovascular accident (CVA) ([facility]) (12/30/2016), Chickenpox, CKD (chronic kidney disease) stage 3, GFR 30-59 ml/min ([facility]), Controlled gout (2014), Coronary atherosclerosis of native coronary artery, Daytime sleepiness, Depression, Diabetes ([facility]), Diarrhea (7/2/2019), Difficulty swallowing, Dysphagia (3/15/2021), Enterococcal septicemia ([facility]) (8/12/2017), Exposure to SARS-associated coronavirus (10/13/2021), Roberto hematuria (1/29/2020), Frequent urination, GERD (gastroesophageal reflux disease), Iraqi measles, History of renal calculi (11/19/2019), Hordeolum externum of left lower eyelid (9/14/2021), Hydronephrosis (1/20/2020), Hypertension (06/30/2021), Hypokalemia (2012), Hypomagnesemia (08/12/2017), Influenza A (1/26/2020), Insomnia, Kidney stone, Left hand weakness (5/20/2019), Leg edema, right (1/22/2020), Lymphoma ([facility]) (2/19/2017), Mixed hyperlipidemia, Muscle strain of right gluteal region (5/20/2019), Nephrolithiasis (2006), Normocytic hypochromic anemia (5/20/2017), NSTEMI (non-ST elevated myocardial infarction) ([facility]) (07/18/2017), Pain (06/30/2021), Peripheral vascular disease ([facility]), Polyneuropathy in diabetes(357.2) (9/11/2013), Pyelonephritis (5/24/2022), Renal cyst (1/29/2020), Renal mass (1/21/2020), Septic shock ([facility]) (5/20/2017), Skin ulcer of calf ([facility]) (2015), Stage 3b chronic kidney disease ([facility]) (8/25/2016), Stroke ([facility]) (2016), Toenail avulsion, initial encounter- left foot 3rd digit (7/12/2021), Urinary bladder disorder, Urinary incontinence, UTI (urinary tract infection) (5/28/2022), Weakness, and Wound of left leg (2012).    Social History:  Social History     Tobacco Use   • Smoking status: Never Smoker   • Smokeless tobacco: Never Used   Vaping Use   • Vaping Use: Never used   Substance and Sexual Activity   • Alcohol use: Never   • Drug use: Never   • Sexual activity: Not Currently     Partners: Female      Comment: , one daughter, 2 grands       Surgical History:   has a past surgical history that includes lithotripsy; angioplasty (1997); wound closure general (4/3/2012); tonsillectomy and adenoidectomy; cataract extraction with iol; solo by laparoscopy (1998); cystoscopy stent placement (Left, 2/12/2018); ureteroscopy (Left, 2/12/2018); lasertripsy (Left, 2/12/2018); [facility] cardiac cath (1997); [facility] cardiac cath (2009); tonsillectomy; lumbar transforaminal epidural steroid injection (Right, 11/2/2020); lumbar transforaminal epidural steroid injection (Right, 3/29/2021); upper gi endoscopy,diagnosis (N/A, 7/21/2021); upper gi endoscopy,biopsy (N/A, 7/21/2021); and inj lumbar/sacral,w/ imaging (7/27/2021).    Current Medications:  Home Medications     Reviewed by Nehal Rockwell R.N. (Registered Nurse) on 07/14/22 at 1556  Med List Status: Not Addressed   Medication Last Dose Status   acetaminophen (TYLENOL) 325 MG Tab  Active   allopurinol (ZYLOPRIM) 100 MG Tab  Active   atorvastatin (LIPITOR) 80 MG tablet  Active   fenofibrate (TRICOR) 145 MG Tab  Active   fenofibrate (TRICOR) 145 MG Tab  Active   fluoxetine (PROZAC) 40 MG capsule  Active   gabapentin (NEURONTIN) 100 MG Cap  Active   Insulin Glargine, 1 Unit Dial, (TOUJEO SOLOSTAR) 300 UNIT/ML Solution Pen-injector  Active   insulin lispro (HUMALOG,ADMELOG) 100 UNIT/ML Solution Pen-injector injection PEN  Active   Insulin Pen Needle (PEN NEEDLES) 32G X 4 MM Misc  Active   Insulin Pen Needle 32 G x 4 mm  Active   insulin regular (HUMULIN R) 100 Unit/mL Solution  Active   losartan (COZAAR) 50 MG Tab  Active   magnesium oxide (MAG-OX) 400 MG Tab tablet  Active   metoprolol SR (TOPROL XL) 100 MG TABLET SR 24 HR  Active   omeprazole (PRILOSEC) 20 MG delayed-release capsule  Active   ondansetron (ZOFRAN ODT) 4 MG TABLET DISPERSIBLE  Active   Potassium Chloride CR (MICRO-K) 8 MEQ Cap CR capsule  Active   rivaroxaban (XARELTO) 15 MG Tab tablet  Active   rivaroxaban  (XARELTO) 20 MG Tab tablet  Active   tamsulosin (FLOMAX) 0.4 MG capsule  Active   traZODone (DESYREL) 50 MG Tab  Active                Allergies:  Allergies   Allergen Reactions   • Diphenhydramine Anxiety     Pt is able to tolerate  Mg benadryl with less anxiety   • Diphenhydramine Hcl Anxiety     Pt is able to tolerate  Mg benadryl with less anxiety   • Lorazepam Unspecified     Disorientation   • Ciprofloxacin      Rash,stomach ache   • Spironolactone      Acute kidney injury       Physical Exam:  Vital Signs: /58   Pulse 68   Temp 36.3 °C (97.4 °F) (Temporal)   Resp 18   Ht 1.829 m (6')   Wt 87.1 kg (192 lb)   SpO2 97%   BMI 26.04 kg/m²   Constitutional: Alert, no acute distress  HENT: Normocephalic, mask in place  Eyes: Pupils equal and reactive, normal conjunctiva  Neck: Supple, normal range of motion, no stridor  Cardiovascular: Extremities are warm and well perfused, no murmur appreciated, normal cardiac auscultation  Pulmonary: No respiratory distress, normal work of breathing, no accessory muscule usage, breath sounds clear and equal bilaterally  Abdomen: Soft, non-distended, non-tender to palpation, no peritoneal signs, no epigastric tenderness to palpation, no rebound, no guarding  Skin: Warm, dry, no rashes or lesions  Musculoskeletal: Normal range of motion in all extremities, no swelling or deformity noted  Neurologic: Alert, oriented, normal speech, normal motor function  Psychiatric: Normal and appropriate mood and affect    Medical records reviewed for continuity of care. PM&R outpatient clinic note reviewed from 6/29/2022.  Mr. Corea has a history of chronic kidney disease, Hodgkin's lymphoma, paroxysmal atrial fibrillation, history of SVT, and has residual left hemiparesis due to CVA in 2016.  Currently anticoagulated on Xarelto.    Lab work reviewed from [facility], this demonstrated white blood count of 16.7, hemoglobin 10.6, platelet count 556.  Creatinine 1.29 with GFR of 58.   "Glucose 175.    Labs:  Labs Reviewed   CBC WITH DIFFERENTIAL - Abnormal; Notable for the following components:       Result Value    WBC 12.3 (*)     RBC 3.86 (*)     Hemoglobin 10.3 (*)     Hematocrit 32.6 (*)     MCH 26.7 (*)     MCHC 31.6 (*)     Platelet Count 482 (*)     Neutrophils-Polys 84.70 (*)     Lymphocytes 7.20 (*)     Neutrophils (Absolute) 10.42 (*)     Lymphs (Absolute) 0.89 (*)     All other components within normal limits   COMP METABOLIC PANEL - Abnormal; Notable for the following components:    Sodium 131 (*)     Glucose 177 (*)     Bun 27 (*)     Creatinine 1.46 (*)     Alkaline Phosphatase 132 (*)     Albumin 2.7 (*)     Total Protein 5.6 (*)     All other components within normal limits   LIPASE - Abnormal; Notable for the following components:    Lipase 69 (*)     All other components within normal limits   URINALYSIS - Abnormal; Notable for the following components:    Color Brown (*)     Character Cloudy (*)     Protein 100 (*)     Bilirubin Small (*)     Leukocyte Esterase Large (*)     Occult Blood Large (*)     All other components within normal limits    Narrative:     Indication for culture:->Patient WITHOUT an indwelling Michaels  catheter in place with new onset of Dysuria, Frequency,  Urgency, and/or Suprapubic pain   ESTIMATED GFR - Abnormal; Notable for the following components:    GFR (CKD-EPI) 50 (*)     All other components within normal limits   URINE MICROSCOPIC (W/UA) - Abnormal; Notable for the following components:    WBC 20-50 (*)     RBC  (*)     Bacteria Few (*)     All other components within normal limits    Narrative:     Indication for culture:->Patient WITHOUT an indwelling Michaels  catheter in place with new onset of Dysuria, Frequency,  Urgency, and/or Suprapubic pain   LACTIC ACID   BLOOD CULTURE    Narrative:     Per Hospital Policy: Only change Specimen Src: to \"Line\" if  specified by physician order.   BLOOD CULTURE   URINE CULTURE(NEW) "       Radiology:  CT-RENAL COLIC EVALUATION(A/P W/O)   Final Result      1.  Again seen exophytic left renal cyst with surrounding perinephric hemorrhage. The amount of perinephric hemorrhage has slightly increased from comparison and a small component extends into the posterior pararenal space.      2.  New moderate bilateral hydronephrosis. There is no evidence of ureteral stone or surrounding mass.      3.  Thickening of the wall of the urinary bladder suggesting chronic outlet obstruction.      4.  No evidence of bowel obstruction or focal inflammatory change.      5.  Small bilateral pleural effusions and bibasilar atelectasis.      6.  Extensive atherosclerotic vascular calcification.      7.  Atrophic kidneys.           ED Medications Administered:  Medications - No data to display    Differential diagnosis:  Occult infection including urinary tract infection, bacteremia, acute blood loss, symptomatic anemia    MDM:  Mr. Bob presents to the emergency department today for evaluation following some abnormal lab results that were drawn by his primary care physician. On arrival to the emergency department his vital signs are reassuring, he has no fever, no tachycardia, no hypotension.  Vital signs are less concerning for Sirs or sepsis.  His abdominal exam is reassuring and benign without any peritoneal signs.    On laboratory evaluation White blood count is 12.3, improved from 16 two days ago at [facility].  Hemoglobin remained stable at 10.3.  Lactic acid is normal, less concerning for sepsis or severe infection.  Creatinine is 1.46 with GFR 50, also not significantly changed from recent outpatient labs.  Lipase is mildly elevated to 69.    Given his report of 2 to 3 days of abdominal pain, with leukocytosis, mildly elevated lipase, CT was ordered for further evaluation of the pancreas.  This was done without contrast due to current national shortage, in accordance with [facility] policy.    CT of the abdomen  pelvis demonstrates left renal cyst with surrounding perinephric hemorrhage, about of hemorrhage is slightly increased from prior.  New moderate bilateral hydronephrosis is present, no evidence of ureteral stone or surrounding mass.  Symptoms are consistent with chronic outlet obstruction.    Elevated white blood count may be due to slightly worsened perinephric hemorrhage.  Urinalysis is positive for bacteria, consistent with reported history of colonization.  Urine is nitrite negative.  Patient has had significant difficulty with antibiotics in the past, and has developed C. difficile, for this reason he would like to avoid any unnecessary antibiotics.  As his white blood count is improving, and he is a known history of colonization of the urine, I do not believe he requires antibiotics for treatment at this time.    He is followed [facility] Barry [facility].  At this time I do not believe he requires any further emergent diagnostics nor treatment, he is counseled to call [facility] tomorrow to schedule a follow-up appointment for close recheck, and also to review his emergency department visit today.  His primary care physician can continue to monitor his white blood count and red blood counts. Return precautions were discussed with the patient, and provided in written form with the patient's discharge instructions.     Personal protective equipment including N95 surgical respirator, goggles, and gloves were used during this encounter.       Disposition:  Discharge home in stable condition    Final Impression:  1. Renal cyst    2. Anticoagulated    3. Gross hematuria        Electronically signed by: Yesenia May MD, 7/14/2022 7:12 PM

## 2022-07-15 NOTE — DISCHARGE INSTRUCTIONS
Please schedule a follow-up appointment with your urologist, call tomorrow morning to have your urologist review your CT imaging, as well as your labs.  Return to the emergency department if you develop any new or worsening symptoms including lightheadedness, shortness of breath, worsening bleeding, or if you have any further concerns.  Please continue to follow-up with your primary care clinic for ongoing monitoring blood work.

## 2022-07-19 NOTE — ED NOTES
ED Positive Culture Follow-up/Notification Note:    Date: 7.19.2022     Patient seen in the ED on 7/14/2022 for lab abnormalities.   1. Renal cyst    2. Anticoagulated    3. Gross hematuria       Discharge Medication List as of 7/14/2022  7:18 PM          Allergies: Diphenhydramine, Diphenhydramine hcl, Lorazepam, Ciprofloxacin, and Spironolactone     Vitals:    07/14/22 1550 07/14/22 1555 07/14/22 1927   BP:  108/58 123/80   Pulse:  68 72   Resp:  18 16   Temp:  36.3 °C (97.4 °F) 36.6 °C (97.8 °F)   TempSrc:  Temporal Temporal   SpO2:  97% 97%   Weight: 87.1 kg (192 lb)     Height: 1.829 m (6')         Final cultures:   Results     Procedure Component Value Units Date/Time    URINE CULTURE(NEW) [[id number]]  (Abnormal) Collected: 07/14/22 1811    Order Status: Completed Specimen: Urine Updated: 07/17/22 0141     Significant Indicator POS     Source UR     Site -     Culture Result -      Candida albicans  ,000 cfu/mL      Narrative:      Indication for culture:->Patient WITHOUT an indwelling Michaels  catheter in place with new onset of Dysuria, Frequency,  Urgency, and/or Suprapubic pain  Indication for culture:->Patient WITHOUT an indwelling Michaels    BLOOD CULTURE [[id number]] Collected: 07/14/22 1713    Order Status: Completed Specimen: Blood from Peripheral Updated: 07/15/22 0510     Significant Indicator NEG     Source BLD     Site PERIPHERAL     Culture Result No Growth  Note: Blood cultures are incubated for 5 days and  are monitored continuously.Positive blood cultures  are called to the RN and reported as soon as  they are identified.  Blood culture testing and Gram stain, if indicated, are  performed at [facility], [address] [place] [address].  Positive blood cultures are  sent to [facility], [address] [place] [address], for organism identification and  susceptibility testing.      Narrative:      Per Hospital Policy: Only change Specimen Src: to  "\"Line\" if  specified by physician order.  Right AC    BLOOD CULTURE [[id number]] Collected: 07/14/22 1915    Order Status: Completed Specimen: Blood from Peripheral Updated: 07/15/22 0510     Significant Indicator NEG     Source BLD     Site PERIPHERAL     Culture Result No Growth  Note: Blood cultures are incubated for 5 days and  are monitored continuously.Positive blood cultures  are called to the RN and reported as soon as  they are identified.  Blood culture testing and Gram stain, if indicated, are  performed at [facility], [address] [place] [address].  Positive blood cultures are  sent to [facility], [address] [place] [address], for organism identification and  susceptibility testing.      Narrative:      Per Hospital Policy: Only change Specimen Src: to \"Line\" if  specified by physician order.  Right Hand    URINALYSIS [[id number]]  (Abnormal) Collected: 07/14/22 1811    Order Status: Completed Specimen: Urine Updated: 07/14/22 1838     Color Brown     Character Cloudy     Specific Gravity 1.010     Ph 7.0     Glucose Negative mg/dL      Ketones Negative mg/dL      Protein 100 mg/dL      Bilirubin Small     Nitrite Negative     Leukocyte Esterase Large     Occult Blood Large     Micro Urine Req Microscopic    Narrative:      Indication for culture:->Patient WITHOUT an indwelling Michaels  catheter in place with new onset of Dysuria, Frequency,  Urgency, and/or Suprapubic pain          Plan:   Urine Cx is positive for C. Albicans  -The same organism grew in June -DW Pts wife who is DPOA - complaints of abd pain in the ED were the first she ever heard of them. She states she has not heard of any complaints since the ED visit. Denies dysuria, frequency, abd pain, and systemic symptoms.  --Known incontinence continues and pt wears  Diaper.     ERP appropriately held antibacterial/antifungal therapy     ?Tinea cruris vs. yeast infection of perineum  --Pts wife " "noted what sounds like tinea cruris - she has been keeping the area clean/dry and applying miconazole. Miconazole has not lead to resolution, but the \"rash\" has not spread.   ---Recommended continued care of the area and switch to terbinafine OTC product.  ---If continues to persist despite terbinafine, ?query changes due to incontinence/chronic diaper wearing?  ---Instructed to follow up at PCP appt on Thursday and with Dr. Barry next week (appt. Established)  ---May consider systemic antifungal therapy [fluc 200mg/day - is the renal dose] to assist in management - will discuss with PCP    Fatigue/anemia  -Slightly incr. hemorrhage from renal cyst  -Strict return precautions provided - wife verbalized understanding.   -CATA noted on previous labs - rivaroxaban dose is still appropriate  -Pt's wife plans to discuss continued 3 month therapy with PCP this week as well    All questions answered to pt's wifes satisfaction.     Ashlyn Michel, PharmD    "

## 2022-07-21 ENCOUNTER — PATIENT OUTREACH (OUTPATIENT)
Dept: HEALTH INFORMATION MANAGEMENT | Facility: OTHER | Age: 77
End: 2022-07-21

## 2022-07-21 ENCOUNTER — OFFICE VISIT (OUTPATIENT)
Dept: MEDICAL GROUP | Facility: PHYSICIAN GROUP | Age: 77
End: 2022-07-21
Payer: MEDICARE

## 2022-07-21 VITALS
HEIGHT: 70 IN | SYSTOLIC BLOOD PRESSURE: 110 MMHG | TEMPERATURE: 97 F | OXYGEN SATURATION: 98 % | HEART RATE: 63 BPM | BODY MASS INDEX: 27.55 KG/M2 | DIASTOLIC BLOOD PRESSURE: 60 MMHG | RESPIRATION RATE: 16 BRPM

## 2022-07-21 DIAGNOSIS — E11.65 TYPE 2 DIABETES MELLITUS WITH HYPERGLYCEMIA, WITH LONG-TERM CURRENT USE OF INSULIN (HCC): ICD-10-CM

## 2022-07-21 DIAGNOSIS — N39.0 RECURRENT UTI: ICD-10-CM

## 2022-07-21 DIAGNOSIS — Z79.4 TYPE 2 DIABETES MELLITUS WITH HYPERGLYCEMIA, WITH LONG-TERM CURRENT USE OF INSULIN (HCC): ICD-10-CM

## 2022-07-21 DIAGNOSIS — D50.0 IRON DEFICIENCY ANEMIA DUE TO CHRONIC BLOOD LOSS: ICD-10-CM

## 2022-07-21 DIAGNOSIS — I82.401 ACUTE DEEP VEIN THROMBOSIS (DVT) OF RIGHT LOWER EXTREMITY, UNSPECIFIED VEIN (HCC): ICD-10-CM

## 2022-07-21 DIAGNOSIS — E83.42 HYPOMAGNESEMIA: ICD-10-CM

## 2022-07-21 DIAGNOSIS — I25.10 CORONARY ARTERY DISEASE INVOLVING NATIVE CORONARY ARTERY OF NATIVE HEART WITHOUT ANGINA PECTORIS: ICD-10-CM

## 2022-07-21 PROCEDURE — 99215 OFFICE O/P EST HI 40 MIN: CPT | Performed by: FAMILY MEDICINE

## 2022-07-21 NOTE — PROGRESS NOTES
Subjective:     CC:   Chief Complaint   Patient presents with   • Blood Pressure Problem     Cut back on losartan taking 1 a day,    • Yeast Infection     Came back on urine from ed, has redness, putting fungal cream but not helping    • Medication Problem     Xarelto, tired and weak all the time, blood in urine since taking rx,   Change rx    • Cyst     Renal cyst showing its bleeding in ED        HPI:   Av presents today with wife. He is in a wheelchair, sleeping during most of the visit. Wife is getting him up for meals, and she is transferring him from bed/wheelchair and grandson helping transfer to car. Continues to work with [facility] through [facility] who also trained the grandson on how to safely transfer.   Would like to discuss multiple concerns today, BP has been running low, 103/61, 102/68 so wife cut back on Losartan 1 tablet in morning daily due to this. Denies increased dizziness, increased fatigue or weakness however feels that Xarelto is causing him to feel tired or fatigued. In evening BP is around 135/70.  Recently in ER on 7/14/22 send by provider for evaluation due to blood work abnormalities as well as persistent hematuria which has been a constant issue since starting Xarelto for his DVT of RLE and partial DVT of LLE. In the ER he was also found to have a bleeding renal cysts, following up with Dr. Barry with [facility] next week regarding the hematuria.Wife did receive a call by infectious disease pharmacist regarding his Candida Albicans that was found in his urine who felt this was colonized, discussed oral Diflucan or antifungal treatment can increase the serum concentration of his Xarelto  and he is currently asymptomatic so we will hold off on treating this, will speak with Dr. Barry about this next week as well. Wife does report he has scrotal fungal rash that the pharmacist told her to use Lamisil which she will . No rashes on groin or anywhere else noted.   Follow up labs  ordered today and faxed to  agency as well as given to wife to monitor CBC,ferritin, and  mag levels, and kidney function. Most recent GFR declined from baseline. Discussed increasing hydration as well.     Recurrent UTI  Chronic hx of UTI's due to self catheterization at home. Last UA showed candida albicans.       Current Outpatient Medications Ordered in Epic   Medication Sig Dispense Refill   • insulin lispro (HUMALOG,ADMELOG) 100 UNIT/ML Solution Pen-injector injection PEN Inject 5 Units under the skin 3 times a day before meals. Humalog 5 units for sugars between 101-150, increase by 2 units if greater than 150.  Correction dosage is 2:50 greater than 150. 9 mL 3   • fenofibrate (TRICOR) 145 MG Tab fenofibrate     • magnesium oxide (MAG-OX) 400 MG Tab tablet Take 400 mg by mouth every day.     • Potassium Chloride CR (MICRO-K) 8 MEQ Cap CR capsule Take 8 mEq by mouth every day.     • omeprazole (PRILOSEC) 20 MG delayed-release capsule Take 1 Capsule by mouth every day. 30 Capsule 2   • rivaroxaban (XARELTO) 20 MG Tab tablet Take 1 Tablet by mouth with dinner. 30 Tablet 2   • tamsulosin (FLOMAX) 0.4 MG capsule Take 1 Capsule by mouth every day. 30 Capsule 2   • traZODone (DESYREL) 50 MG Tab Take 1 Tablet by mouth at bedtime as needed for Sleep. 30 Tablet 3   • allopurinol (ZYLOPRIM) 100 MG Tab TAKE 1 TABLET BY MOUTH EVERY  Tablet 3   • acetaminophen (TYLENOL) 325 MG Tab Take 2 Tablets by mouth every 6 hours as needed for Mild Pain, Moderate Pain or Fever. 30 Tablet 0   • insulin regular (HUMULIN R) 100 Unit/mL Solution Inject 1-6 Units under the skin 4 Times a Day,Before Meals and at Bedtime. 10 mL    • ondansetron (ZOFRAN ODT) 4 MG TABLET DISPERSIBLE Take 1 Tablet by mouth every 8 hours as needed for Nausea. 20 Tablet 1   • gabapentin (NEURONTIN) 100 MG Cap TAKE 1 TO 3 CAPSULES BY MOUTH AT BEDTIME AS NEEDED FOR PAIN (Patient taking differently: Take 100 mg by mouth every day. TAKE 1 TO 3 CAPSULES BY  "MOUTH AT BEDTIME AS NEEDED FOR PAIN) 90 Capsule 3   • Insulin Pen Needle (PEN NEEDLES) 32G X 4 MM Misc 1 Each 5 Times a Day. USE FOR INSULIN SHOTS FIVE TIMES DAILY 500 Each 3   • Insulin Pen Needle 32 G x 4 mm      • Insulin Glargine, 1 Unit Dial, (TOUJEO SOLOSTAR) 300 UNIT/ML Solution Pen-injector Inject 14 Units under the skin every day. Per wife Sliding Scale, per wife gave 22 units on 1/18/2022 1.5 mL 3   • metoprolol SR (TOPROL XL) 100 MG TABLET SR 24 HR Take 1 Tablet by mouth every day. (Patient taking differently: Take 100 mg by mouth at bedtime.) 90 Tablet 3   • losartan (COZAAR) 50 MG Tab Take 1 Tablet by mouth 2 times a day. 180 Tablet 3   • fenofibrate (TRICOR) 145 MG Tab Take 1 Tablet by mouth every day. (Patient taking differently: Take 145 mg by mouth every evening.) 90 Tablet 3   • atorvastatin (LIPITOR) 80 MG tablet Take 1 Tablet by mouth every evening. 90 Tablet 3   • fluoxetine (PROZAC) 40 MG capsule TAKE 1 CAPSULE BY MOUTH EVERY DAY (Patient taking differently: Take 40 mg by mouth every day.) 90 capsule 3     No current [facility]-ordered facility-administered medications on file.       Health Maintenance: Completed      Objective:     Exam:  /60 (BP Location: Right arm, Patient Position: Sitting, BP Cuff Size: Adult)   Pulse 63   Temp 36.1 °C (97 °F) (Temporal)   Resp 16   Ht 1.778 m (5' 10\")   SpO2 98%   BMI 27.55 kg/m²  Body mass index is 27.55 kg/m².    Physical Exam  Vitals reviewed.   Constitutional:       General: He is not in acute distress.     Appearance: Normal appearance.   HENT:      Mouth/Throat:      Mouth: Mucous membranes are moist.      Pharynx: Oropharynx is clear.   Eyes:      Pupils: Pupils are equal, round, and reactive to light.   Cardiovascular:      Rate and Rhythm: Normal rate and regular rhythm.      Pulses: Normal pulses.      Heart sounds: Normal heart sounds. No murmur heard.  Pulmonary:      Effort: Pulmonary effort is normal. No respiratory distress.      " Breath sounds: Normal breath sounds. No stridor. No wheezing, rhonchi or rales.   Chest:      Chest wall: No tenderness.   Abdominal:      General: Abdomen is flat. Bowel sounds are normal.   Skin:     Coloration: Skin is pale.   Neurological:      Mental Status: He is alert. Mental status is at baseline.      Comments: Fatigued, sleeping through visit   Psychiatric:         Mood and Affect: Mood normal.         Behavior: Behavior normal.          A chaperone was offered to the patient during today's exam. Chaperone name: wife and APRN student  was present.      Assessment & Plan:     77 y.o. male with the following -     1. Iron deficiency anemia due to chronic blood loss  Chronic issue, however worsening since on Xarelto for bilateral DVT's. Most recent CBC 7/14/22, hgb 10.3, hct 32.6. Wife wants to hold off on iron supplementation as she is worried it may cause constipation and she is worried it may cause rectal bleeding or hemorrhoids.   - Basic Metabolic Panel; Future  - CBC WITH DIFFERENTIAL; Future  - FERRITIN; Future    2. Hypomagnesemia  - MAGNESIUM; Future    3. Recurrent UTI  Last UA on 7/14/22 showed Candida albicans, no treatment initiated while in ED, wife spoke with Infectious disease pharmacist who recommended no treatment currently since asymptomatic. Will f/u with Dr. Barry next week as well. Will continue to monitor symptoms and let us know if he experiences s/s of UTI. Wife aware and ED precautions given.   4. Acute deep vein thrombosis (DVT) of right lower extremity, unspecified vein (HCC)   Chronic, worsening due to hematuria while on Xarelto. Wife is concerned as this is causing him increased fatigue and weakness that is related to the medication, may be worsening fatigue d/t increased iron deficiency from blood loss. Will continue on Xarelto for 2 more months and f/u with PCP for repeat US orders.     I spent a total of 48 minutes with record review, exam, communication with the patient,  communication with other providers, and documentation of this encounter.      No follow-ups on file.    Please note that this dictation was created using voice recognition software. I have made every reasonable attempt to correct obvious errors, but I expect that there are errors of grammar and possibly content that I did not discover before finalizing the note.

## 2022-07-21 NOTE — LETTER
[facility]  Madison Bunch D.O.  [address]  [place] [address]  Fax: [phone]   Authorization for Release/Disclosure of   Protected Health Information   Name: AV MADRIGAL : 1945 SSN: [id number]   Address: [address]  [address]  [place] [address] Phone:    [phone] (home)    I authorize the entity listed below to release/disclose the PHI below to:   [facility]/Madison Bunch D.O. and CHANELLE Burnett   Provider or Entity Name:  Dr.Friedlander Aguilera [facility]    Address   [facility]   Phone:  [phone]    Fax:     Reason for request: continuity of care   Information to be released:    [  ] LAST COLONOSCOPY,  including any PATH REPORT and follow-up  [  ] LAST FIT/COLOGUARD RESULT [  ] LAST DEXA  [  ] LAST MAMMOGRAM  [  ] LAST PAP  [  ] LAST LABS [ x ] RETINA EXAM REPORT  [  ] IMMUNIZATION RECORDS  [x  ] Release all info      [  ] Check here and initial the line next to each item to release ALL health information INCLUDING  _____ Care and treatment for drug and / or alcohol abuse  _____ HIV testing, infection status, or AIDS  _____ Genetic Testing    DATES OF SERVICE OR TIME PERIOD TO BE DISCLOSED: _____________  I understand and acknowledge that:  * This Authorization may be revoked at any time by you in writing, except if your health information has already been used or disclosed.  * Your health information that will be used or disclosed as a result of you signing this authorization could be re-disclosed by the recipient. If this occurs, your re-disclosed health information may no longer be protected by State or Federal laws.  * You may refuse to sign this Authorization. Your refusal will not affect your ability to obtain treatment.  * This Authorization becomes effective upon signing and will  on (date) __________.      If no date is indicated, this Authorization will  one (1) year from the signature date.    Name: Av  Alejandro Bob    Signature:   Date:     7/21/2022       PLEASE FAX REQUESTED RECORDS BACK TO: [phone]

## 2022-07-21 NOTE — PROGRESS NOTES
Nurse FREDO met with patient and spouse prior to PCP appointment today. Nurse provided information on CCM program. Provided brochure on program. Pt is interested in enrollment. Pt very sleepy during visit with nurse. Spouse agreeable to have nurse call next week to complete assessment and care plan for enrollment.

## 2022-07-25 ENCOUNTER — PATIENT OUTREACH (OUTPATIENT)
Dept: HEALTH INFORMATION MANAGEMENT | Facility: OTHER | Age: 77
End: 2022-07-25
Payer: MEDICARE

## 2022-07-25 DIAGNOSIS — E11.65 TYPE 2 DIABETES MELLITUS WITH HYPERGLYCEMIA, WITH LONG-TERM CURRENT USE OF INSULIN (HCC): ICD-10-CM

## 2022-07-25 DIAGNOSIS — E11.21 DIABETIC NEPHROPATHY ASSOCIATED WITH TYPE 2 DIABETES MELLITUS (HCC): ICD-10-CM

## 2022-07-25 DIAGNOSIS — Z79.4 TYPE 2 DIABETES MELLITUS WITH HYPERGLYCEMIA, WITH LONG-TERM CURRENT USE OF INSULIN (HCC): ICD-10-CM

## 2022-07-25 DIAGNOSIS — I10 ESSENTIAL HYPERTENSION, BENIGN: ICD-10-CM

## 2022-07-25 SDOH — HEALTH STABILITY: PHYSICAL HEALTH: ON AVERAGE, HOW MANY DAYS PER WEEK DO YOU ENGAGE IN MODERATE TO STRENUOUS EXERCISE (LIKE A BRISK WALK)?: 0 DAYS

## 2022-07-25 SDOH — HEALTH STABILITY: PHYSICAL HEALTH: ON AVERAGE, HOW MANY MINUTES DO YOU ENGAGE IN EXERCISE AT THIS LEVEL?: 0 MIN

## 2022-07-25 SDOH — ECONOMIC STABILITY: FOOD INSECURITY: WITHIN THE PAST 12 MONTHS, THE FOOD YOU BOUGHT JUST DIDN'T LAST AND YOU DIDN'T HAVE MONEY TO GET MORE.: NEVER TRUE

## 2022-07-25 SDOH — ECONOMIC STABILITY: FOOD INSECURITY: WITHIN THE PAST 12 MONTHS, YOU WORRIED THAT YOUR FOOD WOULD RUN OUT BEFORE YOU GOT MONEY TO BUY MORE.: NEVER TRUE

## 2022-07-25 SDOH — ECONOMIC STABILITY: HOUSING INSECURITY
IN THE LAST 12 MONTHS, WAS THERE A TIME WHEN YOU DID NOT HAVE A STEADY PLACE TO SLEEP OR SLEPT IN A SHELTER (INCLUDING NOW)?: NO

## 2022-07-25 SDOH — ECONOMIC STABILITY: INCOME INSECURITY: IN THE LAST 12 MONTHS, WAS THERE A TIME WHEN YOU WERE NOT ABLE TO PAY THE MORTGAGE OR RENT ON TIME?: NO

## 2022-07-25 ASSESSMENT — PATIENT HEALTH QUESTIONNAIRE - PHQ9
5. POOR APPETITE OR OVEREATING: 1 - SEVERAL DAYS
CLINICAL INTERPRETATION OF PHQ2 SCORE: 6
SUM OF ALL RESPONSES TO PHQ QUESTIONS 1-9: 14

## 2022-07-25 ASSESSMENT — SOCIAL DETERMINANTS OF HEALTH (SDOH): HOW HARD IS IT FOR YOU TO PAY FOR THE VERY BASICS LIKE FOOD, HOUSING, MEDICAL CARE, AND HEATING?: NOT HARD AT ALL

## 2022-07-25 NOTE — PROGRESS NOTES
Nurse FREDO outreach call to complete intake assessment for CCM program. Pt currently eating breakfast. Spouse requesting call back later today.   7/25/22 3:15pm. Nurse FREDO did outreach call and spoke to patient and spouse to complete intake assessment for CCM program.  Spouse reports patient continue to feel weak after [facility]. Pt has history of UTI, weakness and confusion. History of CVA 2016. Pt has been working with [facility] with nursing and physical therapy. Spouse relies on grandson to assist with transfer from wheelchair to car. Spouse reports patient does get up for meals. Need maximum assist with transfer and bathing. Spouse reports patient is incontinent of urine and stool. Pt needing assistance with  his ADL's.     INITIAL CARE MANAGEMENT CARE PLAN/ASSESEMENT     Medication Self-Management Goals:     Reviewed medications listed below with patient. Spouse states pt taking magnesium 400 mg tablets 2 in the am and 2 in the pm. Pt also taking losartan 50 mg once a day if blood pressure reading low. Spouse states today blood pressure reading was 86/59. States she held the losartan. Encouraged spouse to recheck blood pressure reading and notify cardiology of low blood pressure reading. Reports patient is not feeling dizzy. Spouse states pt has been running low in the am and then higher in afternoon. Spouse to contact cardiology office with readings. Spouse rechecked B/P reading at 4:30pm reading is now 136/74. Encouraged spouse to notify cardiology for recommendations on medications for B/P.      Current Outpatient Medications:   •  insulin lispro (HUMALOG,ADMELOG) 100 UNIT/ML Solution Pen-injector injection PEN, Inject 5 Units under the skin 3 times a day before meals. Humalog 5 units for sugars between 101-150, increase by 2 units if greater than 150.  Correction dosage is 2:50 greater than 150., Disp: 9 mL, Rfl: 3  •  fenofibrate (TRICOR) 145 MG Tab, fenofibrate, Disp: , Rfl:   •   magnesium oxide (MAG-OX) 400 MG Tab tablet, Take 400 mg by mouth every day., Disp: , Rfl:   •  Potassium Chloride CR (MICRO-K) 8 MEQ Cap CR capsule, Take 8 mEq by mouth every day., Disp: , Rfl:   •  omeprazole (PRILOSEC) 20 MG delayed-release capsule, Take 1 Capsule by mouth every day., Disp: 30 Capsule, Rfl: 2  •  rivaroxaban (XARELTO) 20 MG Tab tablet, Take 1 Tablet by mouth with dinner., Disp: 30 Tablet, Rfl: 2  •  tamsulosin (FLOMAX) 0.4 MG capsule, Take 1 Capsule by mouth every day., Disp: 30 Capsule, Rfl: 2  •  traZODone (DESYREL) 50 MG Tab, Take 1 Tablet by mouth at bedtime as needed for Sleep., Disp: 30 Tablet, Rfl: 3  •  allopurinol (ZYLOPRIM) 100 MG Tab, TAKE 1 TABLET BY MOUTH EVERY DAY, Disp: 100 Tablet, Rfl: 3  •  acetaminophen (TYLENOL) 325 MG Tab, Take 2 Tablets by mouth every 6 hours as needed for Mild Pain, Moderate Pain or Fever., Disp: 30 Tablet, Rfl: 0  •  insulin regular (HUMULIN R) 100 Unit/mL Solution, Inject 1-6 Units under the skin 4 Times a Day,Before Meals and at Bedtime., Disp: 10 mL, Rfl:   •  ondansetron (ZOFRAN ODT) 4 MG TABLET DISPERSIBLE, Take 1 Tablet by mouth every 8 hours as needed for Nausea., Disp: 20 Tablet, Rfl: 1  •  gabapentin (NEURONTIN) 100 MG Cap, TAKE 1 TO 3 CAPSULES BY MOUTH AT BEDTIME AS NEEDED FOR PAIN (Patient taking differently: Take 100 mg by mouth every day. TAKE 1 TO 3 CAPSULES BY MOUTH AT BEDTIME AS NEEDED FOR PAIN), Disp: 90 Capsule, Rfl: 3  •  Insulin Pen Needle (PEN NEEDLES) 32G X 4 MM Misc, 1 Each 5 Times a Day. USE FOR INSULIN SHOTS FIVE TIMES DAILY, Disp: 500 Each, Rfl: 3  •  Insulin Pen Needle 32 G x 4 mm, , Disp: , Rfl:   •  Insulin Glargine, 1 Unit Dial, (TOUJEO SOLOSTAR) 300 UNIT/ML Solution Pen-injector, Inject 14 Units under the skin every day. Per wife Sliding Scale, per wife gave 22 units on 1/18/2022, Disp: 1.5 mL, Rfl: 3  •  metoprolol SR (TOPROL XL) 100 MG TABLET SR 24 HR, Take 1 Tablet by mouth every day. (Patient taking differently: Take 100  mg by mouth at bedtime.), Disp: 90 Tablet, Rfl: 3  •  losartan (COZAAR) 50 MG Tab, Take 1 Tablet by mouth 2 times a day., Disp: 180 Tablet, Rfl: 3  •  fenofibrate (TRICOR) 145 MG Tab, Take 1 Tablet by mouth every day. (Patient taking differently: Take 145 mg by mouth every evening.), Disp: 90 Tablet, Rfl: 3  •  atorvastatin (LIPITOR) 80 MG tablet, Take 1 Tablet by mouth every evening., Disp: 90 Tablet, Rfl: 3  •  fluoxetine (PROZAC) 40 MG capsule, TAKE 1 CAPSULE BY MOUTH EVERY DAY (Patient taking differently: Take 40 mg by mouth every day.), Disp: 90 capsule, Rfl: 3         Goal:  Continue to take medications as directed. Have spouse assist with medication management.       Physical/Functional/Environmental Status:        One or more falls in the last year: Yes  Advised to use a cane or walker to get around safely: Yes  Feels unsteady when walking: Yes  Steadies self on furniture while walking at home: Yes  Worried about falling: Yes  Needs to push with hands when rising from a chair: Yes  Has trouble stepping up onto a curb / using stairs: Yes  Often has to rush to the toilet: Yes  Has lost some feeling in feet: Yes  Takes medicine that makes him/her feel lightheaded or more tired than usual: Yes  Takes medicine to sleep or improve mood: Yes  Often feels sad or depressed: Yes  STEADI Risk for Falling Score: 14       Goal: Continue to use walker and wheelchair to assist with mobility. Get assistance with transfers.     Financial Status: reports no problems        Goal: N/A     Transportation Status: Spouse reports she has assistance from her grandson with transportation.       Goal:     Mental/Behavioral/Psychosocial Status:    Depression Screen (PHQ-2/PHQ-9) 6/20/2022 6/21/2022 7/25/2022   PHQ-2 Total Score 0 0 -   PHQ-2 Total Score - - -   PHQ-2 Total Score - - 6   PHQ-9 Total Score - - -   PHQ-9 Total Score - - 14       Interpretation of PHQ-9 Total Score   Score Severity   1-4 No Depression   5-9 Mild Depression    10-14 Moderate Depression   15-19 Moderately Severe Depression   20-27 Severe Depression       Goal:  Follow-up with PCP. Continue to monitor and notify PCP if any worsening depression.      Chronic Care Management Care Plan  Goal: Patient would like to get stronger and be able to ambulate   Barriers: weakness after hospitalization  Interventions: Continue to work with physical therapy, follow-up with specialist.    Start Date: 7/25/22  End Date:                 Discussion:Spouse to continue to monitor blood pressure readings. Notify cardiology of low reading today, 86/59. Spouse will recheck reading.  Nurse sent request to MA to get labs from [facility] that patient had drawn on 7/22. Pt will continue to work with [facility].     Goals: Improve strength and mobility.     Next Scheduled patient outreach:  One Month    Nurse Care Coordinator:  Camilla Navarro RN  Personal Care Management  [phone]

## 2022-07-26 ENCOUNTER — PATIENT MESSAGE (OUTPATIENT)
Dept: CARDIOLOGY | Facility: MEDICAL CENTER | Age: 77
End: 2022-07-26
Payer: MEDICARE

## 2022-07-27 ENCOUNTER — TELEPHONE (OUTPATIENT)
Dept: MEDICAL GROUP | Facility: PHYSICIAN GROUP | Age: 77
End: 2022-07-27
Payer: MEDICARE

## 2022-07-27 DIAGNOSIS — D50.0 IRON DEFICIENCY ANEMIA DUE TO CHRONIC BLOOD LOSS: ICD-10-CM

## 2022-07-27 DIAGNOSIS — E83.42 HYPOMAGNESEMIA: ICD-10-CM

## 2022-07-27 NOTE — TELEPHONE ENCOUNTER
Wife is calling asking for lab results. Results received from [employer] and scanned in Media. Please Advise.

## 2022-07-27 NOTE — TELEPHONE ENCOUNTER
Please call patient's spouse and let her know that the recent labs ordered by [facility]   Continue to have an elevated WBC at 12.4 but this has improved compared to the labs checked two weeks ago.  He continues to be anemic with a h/h 9.7/30.4.  Kidney function has improved and is close to normal at 59.  Magnesium and calcium levels remain low side of normal . These values haven't changed since last time.    Please offer an appt to discuss any questions/concerns further. Telemedicine visit ok.    Thanks,  Tari Anderson M.D.

## 2022-07-27 NOTE — PATIENT COMMUNICATION
Hannah Arrieta P.A.-C.  You 33 minutes ago (11:57 AM)         Called Usha (wife) to discuss low BPs. Ok to continue losartan 50mg once a day in evenings, if SBP <100 during the day hold losartan.   More concerning is the source of his hypotension which may be related to his worsening anemia/hematuria. Fortunately they have follow up with Urology tomorrow to address this.     Message text       --------------------------------------------  NOTED

## 2022-07-28 NOTE — TELEPHONE ENCOUNTER
Patient informed of providers result note. Wife is wondering if we can place orders for labs to repeat within the next couple weeks.

## 2022-07-29 ENCOUNTER — HOSPITAL ENCOUNTER (OUTPATIENT)
Facility: MEDICAL CENTER | Age: 77
End: 2022-07-29
Attending: INTERNAL MEDICINE
Payer: MEDICARE

## 2022-07-29 LAB
ALBUMIN SERPL BCP-MCNC: 3 G/DL (ref 3.2–4.9)
ALBUMIN/GLOB SERPL: 1.3 G/DL
ALP SERPL-CCNC: 123 U/L (ref 30–99)
ALT SERPL-CCNC: 13 U/L (ref 2–50)
ANION GAP SERPL CALC-SCNC: 12 MMOL/L (ref 7–16)
AST SERPL-CCNC: 30 U/L (ref 12–45)
BILIRUB SERPL-MCNC: 0.4 MG/DL (ref 0.1–1.5)
BUN SERPL-MCNC: 23 MG/DL (ref 8–22)
CALCIUM SERPL-MCNC: 8.5 MG/DL (ref 8.5–10.5)
CHLORIDE SERPL-SCNC: 102 MMOL/L (ref 96–112)
CO2 SERPL-SCNC: 23 MMOL/L (ref 20–33)
CREAT SERPL-MCNC: 1.5 MG/DL (ref 0.5–1.4)
FERRITIN SERPL-MCNC: 139 NG/ML (ref 22–322)
GFR SERPLBLD CREATININE-BSD FMLA CKD-EPI: 48 ML/MIN/1.73 M 2
GLOBULIN SER CALC-MCNC: 2.3 G/DL (ref 1.9–3.5)
GLUCOSE SERPL-MCNC: 195 MG/DL (ref 65–99)
IRON SATN MFR SERPL: 11 % (ref 15–55)
IRON SERPL-MCNC: 23 UG/DL (ref 50–180)
POTASSIUM SERPL-SCNC: 5.3 MMOL/L (ref 3.6–5.5)
PROT SERPL-MCNC: 5.3 G/DL (ref 6–8.2)
SODIUM SERPL-SCNC: 137 MMOL/L (ref 135–145)
TIBC SERPL-MCNC: 207 UG/DL (ref 250–450)
UIBC SERPL-MCNC: 184 UG/DL (ref 110–370)

## 2022-07-29 PROCEDURE — 83550 IRON BINDING TEST: CPT

## 2022-07-29 PROCEDURE — 80053 COMPREHEN METABOLIC PANEL: CPT

## 2022-07-29 PROCEDURE — 83540 ASSAY OF IRON: CPT

## 2022-07-29 PROCEDURE — 82728 ASSAY OF FERRITIN: CPT

## 2022-08-09 ENCOUNTER — PATIENT OUTREACH (OUTPATIENT)
Dept: HEALTH INFORMATION MANAGEMENT | Facility: OTHER | Age: 77
End: 2022-08-09
Payer: MEDICARE

## 2022-08-09 DIAGNOSIS — E11.65 TYPE 2 DIABETES MELLITUS WITH HYPERGLYCEMIA, WITH LONG-TERM CURRENT USE OF INSULIN (HCC): ICD-10-CM

## 2022-08-09 DIAGNOSIS — Z79.4 TYPE 2 DIABETES MELLITUS WITH HYPERGLYCEMIA, WITH LONG-TERM CURRENT USE OF INSULIN (HCC): ICD-10-CM

## 2022-08-09 NOTE — PROGRESS NOTES
Patient's spouse called to report patient's depression and anxiety have worsened over the past week. Reports pt waking up in the middle of the night around 1:00am with agitation and anxiety. Spouse reports pt has not been taking Seroquel. States she gave pt a trazadone last night when he wouldn't go to sleep. States it is only helping minimally with sleep and agitation. Reports during the day pt has also been crying more frequently. Spouse states pt also has not been as engaged in activities. Spouse would like PCP recommendation. Nurse CM will route message to PCP.     8/9/22 11:50am: Nurse spoke to PCP. PCP recommends pt not take seroquel at this time. PCP recommends pt take trazodone as needed for sleep. Pt continues to take gabapentin two tablets at bedtime. Spouse reports pt hasn't been taking tramadol for pain unless needed.   Reports pt will occasionally have tramadol for pain. Per spouse last dose of tramadol 8/4 for pain on his buttocks. Spouse states she will give pt  Trazodone 50 mg as needed for sleep. Nurse offered spouse appt tomorrow with PCP at [facility], spouse declining. Update to PCP.

## 2022-08-10 ENCOUNTER — TELEMEDICINE (OUTPATIENT)
Dept: PHYSICAL MEDICINE AND REHAB | Facility: REHABILITATION | Age: 77
End: 2022-08-10
Payer: MEDICARE

## 2022-08-10 DIAGNOSIS — G47.9 DIFFICULTY SLEEPING: ICD-10-CM

## 2022-08-10 DIAGNOSIS — I69.954 HEMIPLEGIA AND HEMIPARESIS FOLLOWING UNSPECIFIED CEREBROVASCULAR DISEASE AFFECTING LEFT NON-DOMINANT SIDE (HCC): ICD-10-CM

## 2022-08-10 DIAGNOSIS — F41.9 ANXIETY: ICD-10-CM

## 2022-08-10 DIAGNOSIS — M25.522 LEFT ELBOW PAIN: ICD-10-CM

## 2022-08-10 DIAGNOSIS — Z86.73 HISTORY OF STROKE: Primary | ICD-10-CM

## 2022-08-10 PROCEDURE — 99214 OFFICE O/P EST MOD 30 MIN: CPT | Mod: 95 | Performed by: PHYSICAL MEDICINE & REHABILITATION

## 2022-08-10 RX ORDER — QUETIAPINE FUMARATE 25 MG/1
50 TABLET, FILM COATED ORAL
Qty: 60 TABLET | Refills: 0 | Status: SHIPPED | OUTPATIENT
Start: 2022-08-10 | End: 2022-08-25

## 2022-08-10 NOTE — PROGRESS NOTES
[facility]  PM&R Neuro Rehabilitation Clinic  [address] [place] [address] [place] [address]  Ph: [phone]    FOLLOW UP OUTPATIENT EVALUATION      This evaluation was conducted via Zoom using secure and encrypted videoconferencing technology. The patient was in a private location in their home in the [facility].  The patient's identity was confirmed and verbal consent was obtained for this virtual visit.    Patient Name: Av Bob   Patient : 1945  Patient Age: 77 y.o.     Examining Physician: Dr. Lorie Huff, DO    PM&R History to Date - Background Information:  Dates of Admission/Discharge to ARU: Larry Williamson - pavan ; [facility] 2022- 2022    SUBJECTIVE:   Patient Identification: Av Bob is a 77 y.o. 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   - He has not had the xray done.   - He can extend his elbow a lot better than he had.   - He is doing well with the UE and the   - Having agitation and anxiety at night. Has been given Trazodone occasionally and up until last night that helped him sleep. Since being home has only needed about 4 times.   - Last night he had it and he was awake most of the night and woke Usha up every 2 hours with different pain complaints.   - Everything was okay this morning in terms of no pain complaints. He was awake at about 11.   - Taking Gabapentin 200 mg at bed.   - Does get cramps at night which bothers him.   - Occasionally has used Tramadol for pain, but that is infrequent.   - Tylenol helps with pain at night.   - Has  been about a week that he has been seemingly agitated and with anxiety.     Review of Systems:  All other pertinent positive review of systems are noted above in HPI.   All other systems reviewed and are negative.    Past Medical History:  Past Medical History:   Diagnosis Date    UTI (urinary tract infection) 5/28/2022    Pyelonephritis 5/24/2022    Av had abrupt onset of illness starting on Sunday.  He felt unwell and then had projectile vomiting x3 episodes after dinner that evening.  He had associated nausea but no overt abdominal pain.  He has continued to have anorexia and is having difficulty with his p.o. intake.  He did get in some fruit in a month and yesterday and about 50 to 60 ounces of water.  He has chronic urinary retention    Acute prostatitis 1/13/2022    New problem of prostatitis identified by [facility] when patient developed hematuria with rectal pain.  He followed up with his urology PA and was placed back on Bactrim.  He was recently on Bactrim and has a history of recurrent urinary tract infections related to neurogenic bladder from prior stroke.  Rectal pain is dissipated however he continues to have hematuria.  He had associated CATA o    Exposure to SARS-associated coronavirus 10/13/2021    He reports viral illness last week with runny nose, congestion, productive cough, and wheezing.  He went to a play of his grandson and may have been exposed to Covid.  He was feeling very bad last Friday and over the weekend and now is at least 50% better.    Hordeolum externum of left lower eyelid 9/14/2021    He reports to clinic with 3 weeks of erythema and pain of the left lower eyelid. No clear inciting cause. Reports no globe pain. Lower > upper eyelid swelling and erythema. After 1.5 weeks had drainage of purulence from the lower medial eyelid. No photosensitivity. Using warm compresses. No changes in visual acuity. Saw retinal specialist around day 1 or 2 of symptoms who felt it could  be related     Toenail avulsion, initial encounter- left foot 3rd digit 7/12/2021    They report that his toenail spontaneously came off last week.  He does not recall specific injury or any pain.  His significant other saw the nail on the floor with dried blood on his foot.  She cleaned the wound and covered it with gauze.  On follow-up in clinic today the gauze has stuck to the wound bed but with saline was fairly easy to remove the dried gauze.  There was scant amount of bright    Pain 06/30/2021    right leg foot    Hypertension 06/30/2021    pt states well controlled on meds    Dysphagia 3/15/2021    He reports progressive worsening of his swallow function.  He notices at least once per week he is choking and coughing, can be with pills or can be with food.  The episodes are quite frightening and he takes a bit of time for him to recover.  He has a prior history of stroke and recalls working with speech-language pathology at that time but does not remember if he did any formal esophagrams or s    Roberto hematuria 1/29/2020    Renal cyst 1/29/2020    Influenza A 1/26/2020    Leg edema, right 1/22/2020    Acute on chronic renal failure (HCC) 1/21/2020    Renal mass 1/21/2020    Hydronephrosis 1/20/2020    History of renal calculi 11/19/2019    Diarrhea 7/2/2019    Acute cystitis without hematuria 6/30/2019    Muscle strain of right gluteal region 5/20/2019    Left hand weakness 5/20/2019    (Formerly [facility]) Chronic ulcer of right lower extremity with fat layer exposed 12/27/2018    Enterococcal septicemia ([facility]ly [facility]) 8/12/2017    Hypomagnesemia 08/12/2017    etiology uncertain    NSTEMI (non-ST elevated myocardial infarction) (Formerly [facility]) 07/18/2017    complicating UTI with sepsis    Acute respiratory failure with hypoxia ([facility]ly [facility]) 5/20/2017    Septic shock ([facility]ly [facility]) 5/20/2017    Normocytic hypochromic anemia 5/20/2017    Lymphoma (Formerly [facility]) 2/19/2017    Large cell    Cerebrovascular accident (CVA) (Formerly [facility]) 12/30/2016    Left arm weakness  etiology of  stroke not established, lymphoma discovered on MRI evaluation of stroke, L hemiparesis much worse after acute infectious illness in mid 2017, but no specific diagnosed recurrent neurological etiology, all at [facility]    Stage 3b chronic kidney disease (HCC) 8/25/2016     Latest Reference Range & Units 04/29/22 11:14 Bun 8 - 22 mg/dL 33 (H) Creatinine 0.50 - 1.40 mg/dL 1.55 (H) GFR (CKD-EPI) >60 mL/min/1.73 m 2 46 ! [1]  He has a history of chronic kidney disease related to nephrolithiasis, recurrent UTIs, and BPH as well as history of hypertension and diabetes.  He is following with nephrology, Dr. Jarvis and urology, Dr. Barry.  Spironolactone was discontinued due     Stroke (HCC) 2016    left sided weakness    Skin ulcer of calf ([facility]) 2015    Right, Dr. Terry and wound care    Controlled gout 2014    Polyneuropathy in diabetes(357.2) 9/11/2013    Hypokalemia 2012    controlled with combination of ACE inhibitor or ARB plus spironolactone    Wound of left leg 2012    Requiring surgery and debridment, Dr. Moore    Nephrolithiasis 2006    right kidney subsequent lithotripsy by Dr. Barry    CAD (coronary artery disease)     GIOVANNA to RCA in '97, GIOVANNA X2 to LAD and GIOVANNA X2 to OM in '09    Cancer ([facility])     2017; chemo lympoma non hodgkins lymphoma    Cataract     dez iol    Chickenpox     CKD (chronic kidney disease) stage 3, GFR 30-59 ml/min ([facility])     Coronary atherosclerosis of native coronary artery     S/P PTCA (percutaneous transluminal coronary angioplasty), RCA, 5/1997, patent on cath 7/10/2009 at the time of interventions on his left anterior descending and circumflex coronary arteries    Daytime sleepiness     Depression     Diabetes (HCC)     Difficulty swallowing     Frequent urination     GERD (gastroesophageal reflux disease)     Frisian measles     Insomnia     Kidney stone     Mixed hyperlipidemia     Peripheral vascular disease ([facility])     Urinary bladder disorder     Urinary  incontinence     pt wears pads    Weakness       Past Surgical History:   Procedure Laterality Date    IN INJ LUMBAR/SACRAL,W/ IMAGING  7/27/2021    Procedure: Lumbar L5-S1 interlaminar epidural steroid injection;  Surgeon: Harpal Bennett M.D.;  Location: [facility];  Service: Pain Management    IN UPPER GI ENDOSCOPY,DIAGNOSIS N/A 7/21/2021    Procedure: GASTROSCOPY - AND DILATION;  Surgeon: Neville Huerta M.D.;  Location: SURGERY SAME DAY [facility];  Service: Gastroenterology    IN UPPER GI ENDOSCOPY,BIOPSY N/A 7/21/2021    Procedure: GASTROSCOPY, WITH BIOPSY;  Surgeon: Neville Huerta M.D.;  Location: SURGERY SAME DAY [facility];  Service: Gastroenterology    LUMBAR TRANSFORAMINAL EPIDURAL STEROID INJECTION Right 3/29/2021    Procedure: RIGHT L3-4 and L4-5 transforaminal epidural steroid injection with fluoroscopic guidance;  Surgeon: Harpal Bennett M.D.;  Location: [facility];  Service: Pain Management    LUMBAR TRANSFORAMINAL EPIDURAL STEROID INJECTION Right 11/2/2020    Procedure: INJECTION, STEROID, SPINE, LUMBAR, EPIDURAL, TRANSFORAMINAL APPROACH;  Surgeon: Harpal Bennett M.D.;  Location: [facility];  Service: Pain Management    CYSTOSCOPY STENT PLACEMENT Left 2/12/2018    Procedure: CYSTOSCOPY  ;  Surgeon: Rey Barry M.D.;  Location: [facility];  Service: Urology    URETEROSCOPY Left 2/12/2018    Procedure: URETEROSCOPY- FLEXIBLE  ;  Surgeon: Rey Barry M.D.;  Location: SURGERY [facility];  Service: Urology    LASERTRIPSY Left 2/12/2018    Procedure: LASERTRIPSY - LITHO  ;  Surgeon: Rey Barry M.D.;  Location: SURGERY [facility];  Service: Urology    WOUND CLOSURE GENERAL  4/3/2012    Performed by GERHARD GILBERT at SURGERY SAME DAY [facility] ORS    [facility] CARDIAC CATH  2009    Stents to LAD, Om    DAGOBERTO BY LAPAROSCOPY  1998    ANGIOPLASTY  1997    RCA followed by other stents as noted above.     [facility] CARDIAC CATH  1997     stent RCA    CATARACT EXTRACTION WITH IOL      bilateral    LITHOTRIPSY      TONSILLECTOMY      TONSILLECTOMY AND ADENOIDECTOMY          Current Outpatient Medications:     QUEtiapine (SEROQUEL) 25 MG Tab, Take 2 Tablets by mouth at bedtime as needed.  May repeat 1 time. (Anxiety)., Disp: 60 Tablet, Rfl: 0    insulin lispro (HUMALOG,ADMELOG) 100 UNIT/ML Solution Pen-injector injection PEN, Inject 5 Units under the skin 3 times a day before meals. Humalog 5 units for sugars between 101-150, increase by 2 units if greater than 150.  Correction dosage is 2:50 greater than 150., Disp: 9 mL, Rfl: 3    fenofibrate (TRICOR) 145 MG Tab, fenofibrate, Disp: , Rfl:     magnesium oxide (MAG-OX) 400 MG Tab tablet, Take 800 mg by mouth 2 times a day., Disp: , Rfl:     Potassium Chloride CR (MICRO-K) 8 MEQ Cap CR capsule, Take 8 mEq by mouth every day., Disp: , Rfl:     omeprazole (PRILOSEC) 20 MG delayed-release capsule, Take 1 Capsule by mouth every day., Disp: 30 Capsule, Rfl: 2    rivaroxaban (XARELTO) 20 MG Tab tablet, Take 1 Tablet by mouth with dinner., Disp: 30 Tablet, Rfl: 2    traZODone (DESYREL) 50 MG Tab, Take 1 Tablet by mouth at bedtime as needed for Sleep. (Patient not taking: Reported on 7/25/2022), Disp: 30 Tablet, Rfl: 3    allopurinol (ZYLOPRIM) 100 MG Tab, TAKE 1 TABLET BY MOUTH EVERY DAY, Disp: 100 Tablet, Rfl: 3    acetaminophen (TYLENOL) 325 MG Tab, Take 2 Tablets by mouth every 6 hours as needed for Mild Pain, Moderate Pain or Fever., Disp: 30 Tablet, Rfl: 0    gabapentin (NEURONTIN) 100 MG Cap, TAKE 1 TO 3 CAPSULES BY MOUTH AT BEDTIME AS NEEDED FOR PAIN (Patient taking differently: Take 100 mg by mouth every day. TAKE 1 TO 3 CAPSULES BY MOUTH AT BEDTIME AS NEEDED FOR PAIN), Disp: 90 Capsule, Rfl: 3    Insulin Pen Needle (PEN NEEDLES) 32G X 4 MM Misc, 1 Each 5 Times a Day. USE FOR INSULIN SHOTS FIVE TIMES DAILY, Disp: 500 Each, Rfl: 3    Insulin Pen Needle 32 G x 4 mm, , Disp: , Rfl:     Insulin  Glargine, 1 Unit Dial, (TOUJEO SOLOSTAR) 300 UNIT/ML Solution Pen-injector, Inject 14 Units under the skin every day. Per wife Sliding Scale, per wife gave 22 units on 1/18/2022, Disp: 1.5 mL, Rfl: 3    metoprolol SR (TOPROL XL) 100 MG TABLET SR 24 HR, Take 1 Tablet by mouth every day. (Patient taking differently: Take 100 mg by mouth at bedtime.), Disp: 90 Tablet, Rfl: 3    losartan (COZAAR) 50 MG Tab, Take 1 Tablet by mouth 2 times a day., Disp: 180 Tablet, Rfl: 3    fenofibrate (TRICOR) 145 MG Tab, Take 1 Tablet by mouth every day. (Patient taking differently: Take 145 mg by mouth every evening.), Disp: 90 Tablet, Rfl: 3    atorvastatin (LIPITOR) 80 MG tablet, Take 1 Tablet by mouth every evening., Disp: 90 Tablet, Rfl: 3    fluoxetine (PROZAC) 40 MG capsule, TAKE 1 CAPSULE BY MOUTH EVERY DAY (Patient taking differently: Take 40 mg by mouth every day.), Disp: 90 capsule, Rfl: 3  Allergies   Allergen Reactions    Diphenhydramine Anxiety     Pt is able to tolerate  Mg benadryl with less anxiety    Diphenhydramine Hcl Anxiety     Pt is able to tolerate  Mg benadryl with less anxiety    Lorazepam Unspecified     Disorientation    Ciprofloxacin      Rash,stomach ache    Spironolactone      Acute kidney injury        Past Social History:  Social History     Socioeconomic History    Marital status:      Spouse name: Not on file    Number of children: Not on file    Years of education: Not on file    Highest education level: Not on file   Occupational History    Not on file   Tobacco Use    Smoking status: Never    Smokeless tobacco: Never   Vaping Use    Vaping Use: Never used   Substance and Sexual Activity    Alcohol use: Never    Drug use: Never    Sexual activity: Not Currently     Partners: Female     Comment: , one daughter, 2 grands   Other Topics Concern     Service Yes    Blood Transfusions No    Caffeine Concern No    Occupational Exposure No    Hobby Hazards No    Sleep Concern Yes     Stress Concern No    Weight Concern No    Special Diet No    Back Care No    Exercise Yes    Bike Helmet No    Seat Belt Yes    Self-Exams Yes   Social History Narrative    Av is from [place], [place] and raised in [place]. He has been in [place] since 2004. 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  He has been  to Usha since 2003, they met through a mutual friend. He has a daughter (Kalina) and a step son (Jimi).     Social Determinants of Health     Financial Resource Strain: Low Risk     Difficulty of Paying Living Expenses: Not hard at all   Food Insecurity: No Food Insecurity    Worried About Running Out of Food in the Last Year: Never true    Ran Out of Food in the Last Year: Never true   Transportation Needs: Not on file   Physical Activity: Inactive    Days of Exercise per Week: 0 days    Minutes of Exercise per Session: 0 min   Stress: No Stress Concern Present    Feeling of Stress : Only a little   Social Connections: Not on file   Intimate Partner Violence: Not on file   Housing Stability: Unknown    Unable to Pay for Housing in the Last Year: No    Number of Places Lived in the Last Year: Not on file    Unstable Housing in the Last Year: No        Family History:  Family History   Problem Relation Age of Onset    Heart Disease Father         CAD    Diabetes Father     Cancer Mother     Psychiatric Illness Mother         Depression    Depression Mother     Kidney stones Brother     Heart Disease Brother     Psychiatric Illness Brother         Depression    Depression Brother     Suicide Attempts Other     Psychiatric Illness Other         autism       Depression and Opioid Screening  PHQ-9:  Depression Screen (PHQ-2/PHQ-9) 6/20/2022 6/21/2022 7/25/2022   PHQ-2  Total Score 0 0 -   PHQ-2 Total Score - - -   PHQ-2 Total Score - - 6   PHQ-9 Total Score - - -   PHQ-9 Total Score - - 14     Interpretation of PHQ-9 Total Score   Score Severity   1-4 No Depression   5-9 Mild Depression   10-14 Moderate Depression   15-19 Moderately Severe Depression   20-27 Severe Depression     OBJECTIVE:   Vital Signs:  There were no vitals filed for this visit.       Pertinent Labs:  Lab Results   Component Value Date/Time    SODIUM 137 07/29/2022 01:38 PM    POTASSIUM 5.3 07/29/2022 01:38 PM    CHLORIDE 102 07/29/2022 01:38 PM    CO2 23 07/29/2022 01:38 PM    GLUCOSE 195 (H) 07/29/2022 01:38 PM    BUN 23 (H) 07/29/2022 01:38 PM    CREATININE 1.50 (H) 07/29/2022 01:38 PM    CREATININE 1.4 05/28/2008 05:42 PM    BUNCREATRAT 10 03/27/2017 02:11 PM       Lab Results   Component Value Date/Time    HBA1C 6.6 (H) 06/01/2022 06:30 AM       Lab Results   Component Value Date/Time    WBC 12.3 (H) 07/14/2022 05:17 PM    RBC 3.86 (L) 07/14/2022 05:17 PM    HEMOGLOBIN 10.3 (L) 07/14/2022 05:17 PM    HEMATOCRIT 32.6 (L) 07/14/2022 05:17 PM    MCV 84.5 07/14/2022 05:17 PM    MCH 26.7 (L) 07/14/2022 05:17 PM    MCHC 31.6 (L) 07/14/2022 05:17 PM    MPV 9.8 07/14/2022 05:17 PM    NEUTSPOLYS 84.70 (H) 07/14/2022 05:17 PM    LYMPHOCYTES 7.20 (L) 07/14/2022 05:17 PM    MONOCYTES 5.70 07/14/2022 05:17 PM    EOSINOPHILS 1.50 07/14/2022 05:17 PM    BASOPHILS 0.20 07/14/2022 05:17 PM    HYPOCHROMIA 1+ 03/21/2012 01:08 PM       Lab Results   Component Value Date/Time    ASTSGOT 30 07/29/2022 01:38 PM    ALTSGPT 13 07/29/2022 01:38 PM        Physical Exam:   GEN: No apparent distress  HEENT: Head normocephalic, atraumatic.  Sclera nonicteric bilaterally, no ocular discharge appreciated bilaterally.  PULMONARY: Breathing nonlabored on room air, no respiratory accessory muscle use.  Not requiring supplemental oxygen.  SKIN: No appreciable skin breakdown on exposed areas of skin.  PSYCH: Mood and affect within normal  limits.  NEURO: Awake alert.  Conversational.  Logical thought content.    ASSESSMENT/PLAN: Av Bob is a 77 y.o. male with rehabilitation history significant for R CVA 12/31/16 with residual left hemiparesis, general debility, here for evaluation. The following plan was discussed with the patient who is in agreement.     Visit Diagnoses     ICD-10-CM   1. History of stroke  Z86.73   2. Anxiety  F41.9   3. Difficulty sleeping  G47.9   4. Hemiplegia and hemiparesis following unspecified cerebrovascular disease affecting left non-dominant side (HCC)  I69.954   5. Left elbow pain  M25.522        Wife, Usha, assists with history.    Rehab/Neuro:   1. 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  States level of debility is out of proportion for how well he recovered after stroke. Even before norovirus virus, while in chemo was able to go to [place], no assistive device. Can use walker a little bit, but fatigues. Also can walk with cane with exercise  or therapy.  2. General debility: ?  CIP/CIM when acutely hospitalized several years ago with norovirus  3. Fatigue: Secondary to debility vs post stroke fatigue vs sleep apnea versus combination of all of the aforementioned.  Has BiPAP, does not tolerate BiPAP.  Also has low T, followed by endocrinology.  -Med management: Xarelto (positive DVT 6/18/2022    Spasticity: Botox significantly helped with left elbow pain.  - Referral: Physiatry for repeat Botox injections.    Okay to continue anticoagulation with Xarelto through the procedure for botulinum toxin injection.  The risks of going off the medication outweigh the risks of doing the procedure on the medication.  I will plan to use 27-gauge needles and ultrasound guidance to avoid all blood vessels during the procedure.  We discussed that while on anticoagulation the  patient does have an increased risk of bleeding and hematoma     Botox Plan: I plan to inject to the left upper extremity  Location Botox Amount in Units   Left bicep  400 units   Total Units  400 units       The risks benefits and alternatives to this procedure were discussed and the patient wishes to proceed with the procedure. Risks include but are not limited to damage to surrounding structures, infection, bleeding, worsening of pain which can be permanent, weakness which can be permanent. Benefits include pain relief, improved function. Alternatives includes not doing the procedure.      Nociceptive pain:   1.  Acute onset elbow pain after elbow casting mid June 2022.  - Improved.    Difficulty sleeping/anxiety/agitation:  -High risk med management: 25 mg Seroquel nightly and 25 mg as needed if inefficacious within 1 hour.      Follow up: October for Botox injections or sooner as needed      Please note that this dictation was created using voice recognition software. I have made every reasonable attempt to correct obvious errors but there may be errors of grammar and content that I may have overlooked prior to finalization of this note.    Dr. Lorie Huff DO, MS  Department of Physical Medicine & Rehabilitation  Neuro Rehabilitation Clinic  [facility]

## 2022-08-14 ENCOUNTER — PATIENT MESSAGE (OUTPATIENT)
Dept: CARDIOLOGY | Facility: MEDICAL CENTER | Age: 77
End: 2022-08-14
Payer: MEDICARE

## 2022-08-14 DIAGNOSIS — E87.6 HYPOKALEMIA: ICD-10-CM

## 2022-08-18 ENCOUNTER — TELEPHONE (OUTPATIENT)
Dept: CARDIOLOGY | Facility: MEDICAL CENTER | Age: 77
End: 2022-08-18
Payer: MEDICARE

## 2022-08-18 RX ORDER — POTASSIUM CHLORIDE 600 MG/1
8 CAPSULE, EXTENDED RELEASE ORAL
Qty: 45 CAPSULE | Refills: 3 | Status: SHIPPED | OUTPATIENT
Start: 2022-08-18 | End: 2023-08-07

## 2022-08-18 NOTE — TELEPHONE ENCOUNTER
Call placed to [facility] for labs to be faxed over to office.       Lab results received and scanned under media.

## 2022-08-19 NOTE — PATIENT COMMUNICATION
You  Hannah Arrieta P.A.-C. 5 hours ago (11:43 AM)     To [facility], ok to renew potassium based on lab results? In Media for review. Thanks      Hannah Arrieta P.A.-C.  You 46 minutes ago (4:15 PM)     Ok. Let's do potassium alonso every other day on the refill. Thanks    Reorder placed  Non fasting BMP ordered to check in two weeks  Pt notified via [employer]

## 2022-08-25 ENCOUNTER — HOSPITAL ENCOUNTER (EMERGENCY)
Facility: MEDICAL CENTER | Age: 77
End: 2022-08-25
Attending: EMERGENCY MEDICINE
Payer: MEDICARE

## 2022-08-25 VITALS
SYSTOLIC BLOOD PRESSURE: 132 MMHG | HEIGHT: 70 IN | TEMPERATURE: 97.6 F | BODY MASS INDEX: 27.84 KG/M2 | OXYGEN SATURATION: 98 % | DIASTOLIC BLOOD PRESSURE: 65 MMHG | HEART RATE: 60 BPM | WEIGHT: 194.45 LBS | RESPIRATION RATE: 18 BRPM

## 2022-08-25 DIAGNOSIS — R45.1 AGITATION: ICD-10-CM

## 2022-08-25 LAB
ALBUMIN SERPL BCP-MCNC: 2.5 G/DL (ref 3.2–4.9)
ALBUMIN/GLOB SERPL: 0.8 G/DL
ALP SERPL-CCNC: 107 U/L (ref 30–99)
ALT SERPL-CCNC: 12 U/L (ref 2–50)
ANION GAP SERPL CALC-SCNC: 10 MMOL/L (ref 7–16)
APPEARANCE UR: ABNORMAL
AST SERPL-CCNC: 24 U/L (ref 12–45)
BACTERIA #/AREA URNS HPF: ABNORMAL /HPF
BASOPHILS # BLD AUTO: 0.3 % (ref 0–1.8)
BASOPHILS # BLD: 0.03 K/UL (ref 0–0.12)
BILIRUB SERPL-MCNC: 0.3 MG/DL (ref 0.1–1.5)
BILIRUB UR QL STRIP.AUTO: NEGATIVE
BUN SERPL-MCNC: 29 MG/DL (ref 8–22)
CALCIUM SERPL-MCNC: 8.5 MG/DL (ref 8.4–10.2)
CHLORIDE SERPL-SCNC: 104 MMOL/L (ref 96–112)
CO2 SERPL-SCNC: 20 MMOL/L (ref 20–33)
COLOR UR: ABNORMAL
CREAT SERPL-MCNC: 1.63 MG/DL (ref 0.5–1.4)
EOSINOPHIL # BLD AUTO: 0.2 K/UL (ref 0–0.51)
EOSINOPHIL NFR BLD: 2.1 % (ref 0–6.9)
EPI CELLS #/AREA URNS HPF: ABNORMAL /HPF
ERYTHROCYTE [DISTWIDTH] IN BLOOD BY AUTOMATED COUNT: 53.5 FL (ref 35.9–50)
GFR SERPLBLD CREATININE-BSD FMLA CKD-EPI: 44 ML/MIN/1.73 M 2
GLOBULIN SER CALC-MCNC: 3.1 G/DL (ref 1.9–3.5)
GLUCOSE SERPL-MCNC: 63 MG/DL (ref 65–99)
GLUCOSE UR STRIP.AUTO-MCNC: NEGATIVE MG/DL
HCT VFR BLD AUTO: 31.4 % (ref 42–52)
HGB BLD-MCNC: 9.9 G/DL (ref 14–18)
IMM GRANULOCYTES # BLD AUTO: 0.09 K/UL (ref 0–0.11)
IMM GRANULOCYTES NFR BLD AUTO: 0.9 % (ref 0–0.9)
KETONES UR STRIP.AUTO-MCNC: NEGATIVE MG/DL
LEUKOCYTE ESTERASE UR QL STRIP.AUTO: ABNORMAL
LYMPHOCYTES # BLD AUTO: 0.9 K/UL (ref 1–4.8)
LYMPHOCYTES NFR BLD: 9.5 % (ref 22–41)
MCH RBC QN AUTO: 25.3 PG (ref 27–33)
MCHC RBC AUTO-ENTMCNC: 31.5 G/DL (ref 33.7–35.3)
MCV RBC AUTO: 80.1 FL (ref 81.4–97.8)
MICRO URNS: ABNORMAL
MONOCYTES # BLD AUTO: 0.58 K/UL (ref 0–0.85)
MONOCYTES NFR BLD AUTO: 6.1 % (ref 0–13.4)
NEUTROPHILS # BLD AUTO: 7.7 K/UL (ref 1.82–7.42)
NEUTROPHILS NFR BLD: 81.1 % (ref 44–72)
NITRITE UR QL STRIP.AUTO: NEGATIVE
NRBC # BLD AUTO: 0 K/UL
NRBC BLD-RTO: 0 /100 WBC
PH UR STRIP.AUTO: 7 [PH] (ref 5–8)
PLATELET # BLD AUTO: 494 K/UL (ref 164–446)
PMV BLD AUTO: 10.5 FL (ref 9–12.9)
POTASSIUM SERPL-SCNC: 4.6 MMOL/L (ref 3.6–5.5)
PROT SERPL-MCNC: 5.6 G/DL (ref 6–8.2)
PROT UR QL STRIP: NEGATIVE MG/DL
RBC # BLD AUTO: 3.92 M/UL (ref 4.7–6.1)
RBC # URNS HPF: ABNORMAL /HPF
RBC UR QL AUTO: ABNORMAL
SODIUM SERPL-SCNC: 134 MMOL/L (ref 135–145)
SP GR UR STRIP.AUTO: 1.01
TRANS CELLS URNS QL MICRO: ABNORMAL /HPF
TSH SERPL DL<=0.005 MIU/L-ACNC: 1.97 UIU/ML (ref 0.38–5.33)
WBC # BLD AUTO: 9.5 K/UL (ref 4.8–10.8)
WBC #/AREA URNS HPF: ABNORMAL /HPF

## 2022-08-25 PROCEDURE — 99284 EMERGENCY DEPT VISIT MOD MDM: CPT

## 2022-08-25 PROCEDURE — 84443 ASSAY THYROID STIM HORMONE: CPT

## 2022-08-25 PROCEDURE — 87086 URINE CULTURE/COLONY COUNT: CPT

## 2022-08-25 PROCEDURE — 87106 FUNGI IDENTIFICATION YEAST: CPT

## 2022-08-25 PROCEDURE — 80053 COMPREHEN METABOLIC PANEL: CPT

## 2022-08-25 PROCEDURE — 85025 COMPLETE CBC W/AUTO DIFF WBC: CPT

## 2022-08-25 PROCEDURE — 81001 URINALYSIS AUTO W/SCOPE: CPT

## 2022-08-25 PROCEDURE — 700105 HCHG RX REV CODE 258: Performed by: EMERGENCY MEDICINE

## 2022-08-25 PROCEDURE — 36415 COLL VENOUS BLD VENIPUNCTURE: CPT

## 2022-08-25 RX ORDER — CHOLECALCIFEROL (VITAMIN D3) 50 MCG
2000 TABLET ORAL EVERY MORNING
COMMUNITY

## 2022-08-25 RX ORDER — FINASTERIDE 5 MG/1
5 TABLET, FILM COATED ORAL EVERY EVENING
COMMUNITY

## 2022-08-25 RX ORDER — LOSARTAN POTASSIUM 50 MG/1
50 TABLET ORAL EVERY EVENING
Status: SHIPPED | COMMUNITY
End: 2022-09-20 | Stop reason: SDUPTHER

## 2022-08-25 RX ORDER — INSULIN GLARGINE 300 U/ML
18-22 INJECTION, SOLUTION SUBCUTANEOUS
Status: SHIPPED | COMMUNITY
End: 2023-11-14

## 2022-08-25 RX ORDER — TRAMADOL HYDROCHLORIDE 50 MG/1
50 TABLET ORAL EVERY 6 HOURS PRN
Status: SHIPPED | COMMUNITY
End: 2023-05-03

## 2022-08-25 RX ORDER — SODIUM CHLORIDE 9 MG/ML
1000 INJECTION, SOLUTION INTRAVENOUS ONCE
Status: COMPLETED | OUTPATIENT
Start: 2022-08-25 | End: 2022-08-25

## 2022-08-25 RX ORDER — GABAPENTIN 100 MG/1
100 CAPSULE ORAL 3 TIMES DAILY
Status: SHIPPED | COMMUNITY
End: 2022-09-07 | Stop reason: SDUPTHER

## 2022-08-25 RX ORDER — ALLOPURINOL 100 MG/1
100 TABLET ORAL EVERY EVENING
Status: SHIPPED | COMMUNITY
End: 2022-10-03 | Stop reason: SDUPTHER

## 2022-08-25 RX ORDER — METOPROLOL SUCCINATE 100 MG/1
100 TABLET, EXTENDED RELEASE ORAL EVERY EVENING
Status: SHIPPED | COMMUNITY
End: 2022-08-29 | Stop reason: SDUPTHER

## 2022-08-25 RX ADMIN — SODIUM CHLORIDE 1000 ML: 9 INJECTION, SOLUTION INTRAVENOUS at 14:33

## 2022-08-25 ASSESSMENT — FIBROSIS 4 INDEX: FIB4 SCORE: 1.29

## 2022-08-25 NOTE — ED NOTES
Comfort measures given to pt and wife  Warm blankets given  Both aware of POC  IV NS up and infusing well

## 2022-08-25 NOTE — ED TRIAGE NOTES
Pt KATHYA MONTENEGRO  is being treated for UTI the last 4 days  is taking Bactrim for it  per wife and EMS fort he last 2 nights pt has been altered t/o the night  in morning he does not remember anything that he says or does the night prior  per pt's wife pt has not been sleeping well at all  has some Hx of same since his stroke 5 yrs ago however this is worse per wife  pt A,A and O x 3 and is lucid

## 2022-08-25 NOTE — ED NOTES
UA rec'ed and sent to lab  Pt incontinent of urine  Cleaned up  Pt may eat per MD,  Dinner given

## 2022-08-25 NOTE — ED NOTES
Pharmacy Medication Reconciliation      ~Medication reconciliation updated and complete per medication list  ~Allergies have been verified per medication list  ~Patient home pharmacy: [facility]

## 2022-08-26 ENCOUNTER — PATIENT OUTREACH (OUTPATIENT)
Dept: HEALTH INFORMATION MANAGEMENT | Facility: OTHER | Age: 77
End: 2022-08-26
Payer: MEDICARE

## 2022-08-26 DIAGNOSIS — Z79.4 TYPE 2 DIABETES MELLITUS WITH HYPERGLYCEMIA, WITH LONG-TERM CURRENT USE OF INSULIN (HCC): ICD-10-CM

## 2022-08-26 DIAGNOSIS — E11.65 TYPE 2 DIABETES MELLITUS WITH HYPERGLYCEMIA, WITH LONG-TERM CURRENT USE OF INSULIN (HCC): ICD-10-CM

## 2022-08-26 NOTE — PROGRESS NOTES
Nurse CM monthly outreach call for follow-up. Pt was seen at the ER room yesterday for increased agitation. [facility] had been out earlier and recommended to spouse to take pt to ER for evaluation. Spouse states she has been following with physiatry. Spouse states she also contacted urology, Dr. Barry for UTI. Spouse states patient did better last night. Reports pt was less agitated and slept better. Spouse states pt didn't have Trazodone last night. Av and spouse will go over recommendations from ER with psychiatry appt on Monday. Spouse reports patient hasn't had any recent falls.   Nurse reviewed recent blood sugar readings. Spouse states she has been adjusting toujeo dose because pt has been running lower blood sugar readings. States some readings 85, and 70 in am. Spouse also adjust humalog based on what pt is eating and blood sugar readings. Spouse is able to teach back appropriate treatment for hypoglycemia. Last A1C 6/1 was 6.6. Spouse has CM contact number and will call if any questions or concerns. Scheduled to see PCP in September.

## 2022-08-26 NOTE — ED NOTES
Pt cleared for d/c  dischg instructions given to pt and wife   Both verbally understands  D/c'ed to home in NAD  Aware to f/u w/ PCP as needed

## 2022-08-28 ENCOUNTER — PATIENT MESSAGE (OUTPATIENT)
Dept: CARDIOLOGY | Facility: MEDICAL CENTER | Age: 77
End: 2022-08-28
Payer: MEDICARE

## 2022-08-29 ENCOUNTER — PATIENT MESSAGE (OUTPATIENT)
Dept: CARDIOLOGY | Facility: MEDICAL CENTER | Age: 77
End: 2022-08-29

## 2022-08-29 ENCOUNTER — TELEMEDICINE (OUTPATIENT)
Dept: PHYSICAL MEDICINE AND REHAB | Facility: MEDICAL CENTER | Age: 77
End: 2022-08-29
Payer: MEDICARE

## 2022-08-29 VITALS — BODY MASS INDEX: 27.77 KG/M2 | HEIGHT: 70 IN | WEIGHT: 194 LBS | TEMPERATURE: 97.3 F

## 2022-08-29 DIAGNOSIS — R45.1 AGITATION: ICD-10-CM

## 2022-08-29 DIAGNOSIS — M25.522 LEFT ELBOW PAIN: ICD-10-CM

## 2022-08-29 DIAGNOSIS — Z86.73 HISTORY OF STROKE: Primary | ICD-10-CM

## 2022-08-29 DIAGNOSIS — G47.9 DIFFICULTY SLEEPING: ICD-10-CM

## 2022-08-29 PROCEDURE — 99214 OFFICE O/P EST MOD 30 MIN: CPT | Mod: 95 | Performed by: PHYSICAL MEDICINE & REHABILITATION

## 2022-08-29 RX ORDER — TRAZODONE HYDROCHLORIDE 50 MG/1
50 TABLET ORAL NIGHTLY
Qty: 30 TABLET | Refills: 2 | Status: SHIPPED | OUTPATIENT
Start: 2022-08-29 | End: 2022-10-12

## 2022-08-29 RX ORDER — METOPROLOL SUCCINATE 100 MG/1
100 TABLET, EXTENDED RELEASE ORAL EVERY EVENING
Qty: 90 TABLET | Refills: 3 | Status: SHIPPED | OUTPATIENT
Start: 2022-08-29 | End: 2022-09-22

## 2022-08-29 RX ORDER — FLUCONAZOLE 200 MG/1
TABLET ORAL
COMMUNITY
End: 2022-09-22

## 2022-08-29 ASSESSMENT — FIBROSIS 4 INDEX: FIB4 SCORE: 1.05

## 2022-08-29 NOTE — PROGRESS NOTES
[facility]  PM&R Neuro Rehabilitation Clinic  [facility] [facility] [place] [address]  Ph: [phone]    FOLLOW UP OUTPATIENT EVALUATION      This evaluation was conducted via Zoom using secure and encrypted videoconferencing technology. The patient was in a private location in their home in the [facility].  The patient's identity was confirmed and verbal consent was obtained for this virtual visit.    Patient Name: Av Bob   Patient : 1945  Patient Age: 77 y.o.     Examining Physician: Dr. Lorie Huff, DO    PM&R History to Date - Background Information:  Dates of Admission/Discharge to ARU: Larry Williamson - pavan ; [facility] 2022- 2022    SUBJECTIVE:   Patient Identification: Av Bob is a 77 y.o. 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   - Sometimes Trazodone helps and sometimes it doesn't.   - Went to ED for AMS and held on antibiotics.   - That night he slept well through the night at ED and up until last night.   - Last night he did get up and sat at end of bed but was unable to get back into bed.   - He didn't sleep from 1 AM to 5 AM and then son came to help him get back in bed.   - Last night he was uncomfortable but didn't seem agitated.   - Labs reviewed - has candidal infection.   - Has guard rails on one side of the bed.   - ED recommended Trazodone.   - QTc reviewed 477 2022  - Has some lab questions.   - Is having some left arm pain only when moving it.     Review of Systems:  All  other pertinent positive review of systems are noted above in HPI.   All other systems reviewed and are negative.    Past Medical History:  Past Medical History:   Diagnosis Date    UTI (urinary tract infection) 5/28/2022    Pyelonephritis 5/24/2022    Av had abrupt onset of illness starting on Sunday.  He felt unwell and then had projectile vomiting x3 episodes after dinner that evening.  He had associated nausea but no overt abdominal pain.  He has continued to have anorexia and is having difficulty with his p.o. intake.  He did get in some fruit in a month and yesterday and about 50 to 60 ounces of water.  He has chronic urinary retention    Acute prostatitis 1/13/2022    New problem of prostatitis identified by dispatch health when patient developed hematuria with rectal pain.  He followed up with his urology PA and was placed back on Bactrim.  He was recently on Bactrim and has a history of recurrent urinary tract infections related to neurogenic bladder from prior stroke.  Rectal pain is dissipated however he continues to have hematuria.  He had associated CATA o    Exposure to SARS-associated coronavirus 10/13/2021    He reports viral illness last week with runny nose, congestion, productive cough, and wheezing.  He went to a play of his grandson and may have been exposed to Covid.  He was feeling very bad last Friday and over the weekend and now is at least 50% better.    Hordeolum externum of left lower eyelid 9/14/2021    He reports to clinic with 3 weeks of erythema and pain of the left lower eyelid. No clear inciting cause. Reports no globe pain. Lower > upper eyelid swelling and erythema. After 1.5 weeks had drainage of purulence from the lower medial eyelid. No photosensitivity. Using warm compresses. No changes in visual acuity. Saw retinal specialist around day 1 or 2 of symptoms who felt it could be related     Toenail avulsion, initial encounter- left foot 3rd digit 7/12/2021    They report that  his toenail spontaneously came off last week.  He does not recall specific injury or any pain.  His significant other saw the nail on the floor with dried blood on his foot.  She cleaned the wound and covered it with gauze.  On follow-up in clinic today the gauze has stuck to the wound bed but with saline was fairly easy to remove the dried gauze.  There was scant amount of bright    Pain 06/30/2021    right leg foot    Hypertension 06/30/2021    pt states well controlled on meds    Dysphagia 3/15/2021    He reports progressive worsening of his swallow function.  He notices at least once per week he is choking and coughing, can be with pills or can be with food.  The episodes are quite frightening and he takes a bit of time for him to recover.  He has a prior history of stroke and recalls working with speech-language pathology at that time but does not remember if he did any formal esophagrams or s    Roberto hematuria 1/29/2020    Renal cyst 1/29/2020    Influenza A 1/26/2020    Leg edema, right 1/22/2020    Acute on chronic renal failure (HCC) 1/21/2020    Renal mass 1/21/2020    Hydronephrosis 1/20/2020    History of renal calculi 11/19/2019    Diarrhea 7/2/2019    Acute cystitis without hematuria 6/30/2019    Muscle strain of right gluteal region 5/20/2019    Left hand weakness 5/20/2019    ([facility]) Chronic ulcer of right lower extremity with fat layer exposed 12/27/2018    Enterococcal septicemia ([facility]) 8/12/2017    Hypomagnesemia 08/12/2017    etiology uncertain    NSTEMI (non-ST elevated myocardial infarction) ([facility]) 07/18/2017    complicating UTI with sepsis    Acute respiratory failure with hypoxia ([facility]) 5/20/2017    Septic shock ([facility]) 5/20/2017    Normocytic hypochromic anemia 5/20/2017    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    Stage 3b chronic kidney disease (HCC) 8/25/2016     Latest Reference Range & Units 04/29/22 11:14 Bun 8 - 22 mg/dL 33 (H) Creatinine 0.50 - 1.40 mg/dL 1.55 (H) GFR (CKD-EPI) >60 mL/min/1.73 m 2 46 ! [1]  He has a history of chronic kidney disease related to nephrolithiasis, recurrent UTIs, and BPH as well as history of hypertension and diabetes.  He is following with nephrology, Dr. Jarvis and urology, Dr. Barry.  Spironolactone was discontinued due     Stroke ([facility]) 2016    left sided weakness    Skin ulcer of calf (HCC) 2015    Right, Dr. Terry and wound care    Controlled gout 2014    Polyneuropathy in diabetes(357.2) 9/11/2013    Hypokalemia 2012    controlled with combination of ACE inhibitor or ARB plus spironolactone    Wound of left leg 2012    Requiring surgery and debridment, Dr. Moore    Nephrolithiasis 2006    right kidney subsequent lithotripsy by Dr. Barry    CAD (coronary artery disease)     GIOVANNA to RCA in '97, GIOVANNA X2 to LAD and GIOVANNA X2 to OM in '09    Cancer ([facility])     2017; chemo lympoma non hodgkins lymphoma    Cataract     dez iol    Chickenpox     CKD (chronic kidney disease) stage 3, GFR 30-59 ml/min ([facility])     Coronary atherosclerosis of native coronary artery     S/P PTCA (percutaneous transluminal coronary angioplasty), RCA, 5/1997, patent on cath 7/10/2009 at the time of interventions on his left anterior descending and circumflex coronary arteries    Daytime sleepiness     Depression     Diabetes (HCC)     Difficulty swallowing     Frequent urination     GERD (gastroesophageal reflux disease)     [place] measles     Insomnia     Kidney stone     Mixed hyperlipidemia     Peripheral vascular disease ([facility])     Urinary bladder disorder     Urinary incontinence     pt wears pads    Weakness       Past Surgical History:   Procedure Laterality Date    OR  INJ LUMBAR/SACRAL,W/ IMAGING  7/27/2021    Procedure: Lumbar L5-S1 interlaminar epidural steroid injection;  Surgeon: Harpal Bennett M.D.;  Location: [facility];  Service: Pain Management    UT UPPER GI ENDOSCOPY,DIAGNOSIS N/A 7/21/2021    Procedure: GASTROSCOPY - AND DILATION;  Surgeon: Neville Huerta M.D.;  Location: SURGERY SAME DAY [facility];  Service: Gastroenterology    UT UPPER GI ENDOSCOPY,BIOPSY N/A 7/21/2021    Procedure: GASTROSCOPY, WITH BIOPSY;  Surgeon: Neville Huerta M.D.;  Location: SURGERY SAME DAY [facility];  Service: Gastroenterology    LUMBAR TRANSFORAMINAL EPIDURAL STEROID INJECTION Right 3/29/2021    Procedure: RIGHT L3-4 and L4-5 transforaminal epidural steroid injection with fluoroscopic guidance;  Surgeon: Harpal Bennett M.D.;  Location: [facility];  Service: Pain Management    LUMBAR TRANSFORAMINAL EPIDURAL STEROID INJECTION Right 11/2/2020    Procedure: INJECTION, STEROID, SPINE, LUMBAR, EPIDURAL, TRANSFORAMINAL APPROACH;  Surgeon: Harpal Bennett M.D.;  Location: [facility];  Service: Pain Management    CYSTOSCOPY STENT PLACEMENT Left 2/12/2018    Procedure: CYSTOSCOPY  ;  Surgeon: Rey Barry M.D.;  Location: SURGERY [facility];  Service: Urology    URETEROSCOPY Left 2/12/2018    Procedure: URETEROSCOPY- FLEXIBLE  ;  Surgeon: Rey Barry M.D.;  Location: SURGERY [facility];  Service: Urology    LASERTRIPSY Left 2/12/2018    Procedure: LASERTRIPSY - LITHO  ;  Surgeon: Rey Barry M.D.;  Location: SURGERY [facility];  Service: Urology    WOUND CLOSURE GENERAL  4/3/2012    Performed by GERHARD GILBERT at SURGERY SAME DAY [facility] ORS    [facility] CARDIAC CATH  2009    Stents to LAD, Om    DAGOBERTO BY LAPAROSCOPY  1998    ANGIOPLASTY  1997    RCA followed by other stents as noted above.     [facility] CARDIAC CATH  1997    stent RCA    CATARACT EXTRACTION WITH IOL      bilateral    LITHOTRIPSY      TONSILLECTOMY       TONSILLECTOMY AND ADENOIDECTOMY          Current Outpatient Medications:     traZODone (DESYREL) 50 MG Tab, Take 1 Tablet by mouth every evening., Disp: 30 Tablet, Rfl: 2    allopurinol (ZYLOPRIM) 100 MG Tab, Take 100 mg by mouth every evening., Disp: , Rfl:     finasteride (PROSCAR) 5 MG Tab, Take 5 mg by mouth every evening., Disp: , Rfl:     Non Formulary Request, Take 250 mg by mouth 2 times a day. Floastor (Probiotic), Disp: , Rfl:     gabapentin (NEURONTIN) 100 MG Cap, Take 100 mg by mouth 3 times a day. 2-3 times day, Disp: , Rfl:     losartan (COZAAR) 50 MG Tab, Take 50 mg by mouth every evening., Disp: , Rfl:     metoprolol SR (TOPROL XL) 100 MG TABLET SR 24 HR, Take 100 mg by mouth every evening., Disp: , Rfl:     Insulin Glargine, 2 Unit Dial, (TOUJEO MAX SOLOSTAR) 300 UNIT/ML Solution Pen-injector, Inject 4-14 Units under the skin at bedtime., Disp: , Rfl:     traMADol (ULTRAM) 50 MG Tab, Take 50 mg by mouth every 6 hours as needed for Severe Pain., Disp: , Rfl:     Multiple Vitamin (MULTIVITAMIN PO), Take 1 Tablet by mouth every morning., Disp: , Rfl:     Cholecalciferol (VITAMIN D) 2000 UNIT Tab, Take 2,000 Units by mouth every morning., Disp: , Rfl:     Potassium Chloride CR (MICRO-K) 8 MEQ Cap CR capsule, Take 1 Capsule by mouth every 48 hours., Disp: 45 Capsule, Rfl: 3    fluoxetine (PROZAC) 40 MG capsule, TAKE 1 CAPSULE BY MOUTH EVERY DAY, Disp: 100 Capsule, Rfl: 0    insulin lispro (HUMALOG,ADMELOG) 100 UNIT/ML Solution Pen-injector injection PEN, Inject 5 Units under the skin 3 times a day before meals. Humalog 5 units for sugars between 101-150, increase by 2 units if greater than 150.  Correction dosage is 2:50 greater than 150., Disp: 9 mL, Rfl: 3    magnesium oxide (MAG-OX) 400 MG Tab tablet, Take 800 mg by mouth 2 times a day., Disp: , Rfl:     omeprazole (PRILOSEC) 20 MG delayed-release capsule, Take 1 Capsule by mouth every day., Disp: 30 Capsule, Rfl: 2    rivaroxaban (XARELTO) 20 MG Tab  tablet, Take 1 Tablet by mouth with dinner., Disp: 30 Tablet, Rfl: 2    acetaminophen (TYLENOL) 325 MG Tab, Take 2 Tablets by mouth every 6 hours as needed for Mild Pain, Moderate Pain or Fever., Disp: 30 Tablet, Rfl: 0    Insulin Pen Needle (PEN NEEDLES) 32G X 4 MM Misc, 1 Each 5 Times a Day. USE FOR INSULIN SHOTS FIVE TIMES DAILY, Disp: 500 Each, Rfl: 3    Insulin Pen Needle 32 G x 4 mm, , Disp: , Rfl:     fenofibrate (TRICOR) 145 MG Tab, Take 1 Tablet by mouth every day., Disp: 90 Tablet, Rfl: 3    atorvastatin (LIPITOR) 80 MG tablet, Take 1 Tablet by mouth every evening., Disp: 90 Tablet, Rfl: 3    fluconazole (DIFLUCAN) 200 MG Tab, fluconazole 200 mg tablet  Take 1 tablet every day by oral route as directed for 14 days., Disp: , Rfl:   Allergies   Allergen Reactions    Diphenhydramine Anxiety     Pt is able to tolerate  Mg benadryl with less anxiety    Lorazepam Unspecified     Disorientation    Spironolactone Unspecified     Acute kidney injury    Ciprofloxacin Rash     Rash,stomach ache        Past Social History:  Social History     Socioeconomic History    Marital status:      Spouse name: Not on file    Number of children: Not on file    Years of education: Not on file    Highest education level: Not on file   Occupational History    Not on file   Tobacco Use    Smoking status: Never    Smokeless tobacco: Never   Vaping Use    Vaping Use: Never used   Substance and Sexual Activity    Alcohol use: Never    Drug use: Never    Sexual activity: Not Currently     Partners: Female     Comment: , one daughter, 2 grands   Other Topics Concern     Service Yes    Blood Transfusions No    Caffeine Concern No    Occupational Exposure No    Hobby Hazards No    Sleep Concern Yes    Stress Concern No    Weight Concern No    Special Diet No    Back Care No    Exercise Yes    Bike Helmet No    Seat Belt Yes    Self-Exams Yes   Social History Narrative    Av is from [place], [place] and raised in  [place]. He has been in [place] since 2004. 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  He has been  to Usha since 2003, they met through a mutual friend. He has a daughter (Kalina) and a step son ([place]).     Social Determinants of Health     Financial Resource Strain: Low Risk     Difficulty of Paying Living Expenses: Not hard at all   Food Insecurity: No Food Insecurity    Worried About Running Out of Food in the Last Year: Never true    Ran Out of Food in the Last Year: Never true   Transportation Needs: Not on file   Physical Activity: Inactive    Days of Exercise per Week: 0 days    Minutes of Exercise per Session: 0 min   Stress: No Stress Concern Present    Feeling of Stress : Only a little   Social Connections: Not on file   Intimate Partner Violence: Not on file   Housing Stability: Unknown    Unable to Pay for Housing in the Last Year: No    Number of Places Lived in the Last Year: Not on file    Unstable Housing in the Last Year: No        Family History:  Family History   Problem Relation Age of Onset    Heart Disease Father         CAD    Diabetes Father     Cancer Mother     Psychiatric Illness Mother         Depression    Depression Mother     Kidney stones Brother     Heart Disease Brother     Psychiatric Illness Brother         Depression    Depression Brother     Suicide Attempts Other     Psychiatric Illness Other         autism       Depression and Opioid Screening  PHQ-9:  Depression Screen (PHQ-2/PHQ-9) 6/20/2022 6/21/2022 7/25/2022   PHQ-2 Total Score 0 0 -   PHQ-2 Total Score - - -   PHQ-2 Total Score - - 6   PHQ-9 Total Score - - -   PHQ-9 Total Score - - 14     Interpretation of PHQ-9 Total Score   Score Severity   1-4 No Depression   5-9 Mild  Depression   10-14 Moderate Depression   15-19 Moderately Severe Depression   20-27 Severe Depression     OBJECTIVE:   Vital Signs:  Vitals:    08/29/22 1157   Temp: 36.3 °C (97.3 °F)        Pertinent Labs:  Lab Results   Component Value Date/Time    SODIUM 134 (L) 08/25/2022 01:47 PM    POTASSIUM 4.6 08/25/2022 01:47 PM    CHLORIDE 104 08/25/2022 01:47 PM    CO2 20 08/25/2022 01:47 PM    GLUCOSE 63 (L) 08/25/2022 01:47 PM    BUN 29 (H) 08/25/2022 01:47 PM    CREATININE 1.63 (H) 08/25/2022 01:47 PM    CREATININE 1.4 05/28/2008 05:42 PM    BUNCREATRAT 10 03/27/2017 02:11 PM       Lab Results   Component Value Date/Time    HBA1C 6.6 (H) 06/01/2022 06:30 AM       Lab Results   Component Value Date/Time    WBC 9.5 08/25/2022 01:47 PM    RBC 3.92 (L) 08/25/2022 01:47 PM    HEMOGLOBIN 9.9 (L) 08/25/2022 01:47 PM    HEMATOCRIT 31.4 (L) 08/25/2022 01:47 PM    MCV 80.1 (L) 08/25/2022 01:47 PM    MCH 25.3 (L) 08/25/2022 01:47 PM    MCHC 31.5 (L) 08/25/2022 01:47 PM    MPV 10.5 08/25/2022 01:47 PM    NEUTSPOLYS 81.10 (H) 08/25/2022 01:47 PM    LYMPHOCYTES 9.50 (L) 08/25/2022 01:47 PM    MONOCYTES 6.10 08/25/2022 01:47 PM    EOSINOPHILS 2.10 08/25/2022 01:47 PM    BASOPHILS 0.30 08/25/2022 01:47 PM    HYPOCHROMIA 1+ 03/21/2012 01:08 PM       Lab Results   Component Value Date/Time    ASTSGOT 24 08/25/2022 01:47 PM    ALTSGPT 12 08/25/2022 01:47 PM        Physical Exam:   GEN: No apparent distress  HEENT: Head normocephalic, atraumatic.  Sclera nonicteric bilaterally, no ocular discharge appreciated bilaterally.  PULMONARY: Breathing nonlabored on room air, no respiratory accessory muscle use.  Not requiring supplemental oxygen.  SKIN: No appreciable skin breakdown on exposed areas of skin.  PSYCH: Mood and affect within normal limits.  NEURO: Awake alert.  Conversational.  Logical thought content.    ASSESSMENT/PLAN: Av Bob is a 77 y.o. male with rehabilitation history significant for R CVA 12/31/16 with  residual left hemiparesis, general debility, here for evaluation. The following plan was discussed with the patient who is in agreement.     Visit Diagnoses     ICD-10-CM   1. History of stroke  Z86.73   2. Difficulty sleeping  G47.9   3. Left elbow pain  M25.522   4. Agitation  R45.1        Wife, Usha, assists with history.    Rehab/Neuro:   1. 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  States level of debility is out of proportion for how well he recovered after stroke. Even before norovirus virus, while in chemo was able to go to [place], no assistive device. Can use walker a little bit, but fatigues. Also can walk with cane with exercise  or therapy.  2. General debility: ?  CIP/CIM when acutely hospitalized several years ago with norovirus  3. Fatigue: Secondary to debility vs post stroke fatigue vs sleep apnea versus combination of all of the aforementioned.  Has BiPAP, does not tolerate BiPAP.  Also has low T, followed by endocrinology.  -Med management: Xarelto (positive DVT 6/18/2022)    Spasticity: Botox significantly helped with left elbow pain.  - Referral: Physiatry for repeat Botox injections.    Okay to continue anticoagulation with Xarelto through the procedure for botulinum toxin injection.  The risks of going off the medication outweigh the risks of doing the procedure on the medication.  I will plan to use 27-gauge needles and ultrasound guidance to avoid all blood vessels during the procedure.  We discussed that while on anticoagulation the patient does have an increased risk of bleeding and hematoma     Botox Plan: I plan to inject to the left upper extremity  Location Botox Amount in Units   Left bicep  400 units   Total Units  400 units       The risks benefits and alternatives to this procedure were discussed and the patient wishes to proceed with the  procedure. Risks include but are not limited to damage to surrounding structures, infection, bleeding, worsening of pain which can be permanent, weakness which can be permanent. Benefits include pain relief, improved function. Alternatives includes not doing the procedure.      Nociceptive pain:   1.  Acute onset elbow pain after elbow casting mid June 2022.  Improved after Botox.  - Monitor left arm pain.  Is having some pain when he moves it, Tylenol is helping.    ID:  Candidal UTI  -Continue fluconazole for full 2-week course.  -Counseled possibly contributing to his recent agitation.    Difficulty sleeping/anxiety/agitation:  -High risk med management: Prescription for trazodone 50 mg nightly.  Scheduled.      Follow up: October for Botox injections or sooner as needed      Please note that this dictation was created using voice recognition software. I have made every reasonable attempt to correct obvious errors but there may be errors of grammar and content that I may have overlooked prior to finalization of this note.    Dr. Lorie Huff DO, MS  Department of Physical Medicine & Rehabilitation  Neuro Rehabilitation Clinic  [facility]

## 2022-09-07 RX ORDER — GABAPENTIN 100 MG/1
100 CAPSULE ORAL 3 TIMES DAILY
Qty: 90 CAPSULE | Refills: 0 | Status: SHIPPED | OUTPATIENT
Start: 2022-09-07 | End: 2022-10-17

## 2022-09-13 ENCOUNTER — APPOINTMENT (OUTPATIENT)
Dept: PHYSICAL MEDICINE AND REHAB | Facility: MEDICAL CENTER | Age: 77
End: 2022-09-13
Payer: MEDICARE

## 2022-09-18 ENCOUNTER — PATIENT MESSAGE (OUTPATIENT)
Dept: CARDIOLOGY | Facility: MEDICAL CENTER | Age: 77
End: 2022-09-18
Payer: MEDICARE

## 2022-09-18 DIAGNOSIS — I10 ESSENTIAL HYPERTENSION, BENIGN: ICD-10-CM

## 2022-09-19 ENCOUNTER — PATIENT MESSAGE (OUTPATIENT)
Dept: CARDIOLOGY | Facility: MEDICAL CENTER | Age: 77
End: 2022-09-19
Payer: MEDICARE

## 2022-09-19 ENCOUNTER — PATIENT MESSAGE (OUTPATIENT)
Dept: MEDICAL GROUP | Facility: PHYSICIAN GROUP | Age: 77
End: 2022-09-19
Payer: MEDICARE

## 2022-09-19 DIAGNOSIS — Z79.4 TYPE 2 DIABETES MELLITUS WITH OTHER SPECIFIED COMPLICATION, WITH LONG-TERM CURRENT USE OF INSULIN (HCC): ICD-10-CM

## 2022-09-19 DIAGNOSIS — E11.69 TYPE 2 DIABETES MELLITUS WITH OTHER SPECIFIED COMPLICATION, WITH LONG-TERM CURRENT USE OF INSULIN (HCC): ICD-10-CM

## 2022-09-19 RX ORDER — PEN NEEDLE, DIABETIC 30 GX3/16"
1 NEEDLE, DISPOSABLE MISCELLANEOUS
Qty: 500 EACH | Refills: 3 | Status: SHIPPED | OUTPATIENT
Start: 2022-09-19 | End: 2023-12-04 | Stop reason: SDUPTHER

## 2022-09-20 RX ORDER — LOSARTAN POTASSIUM 50 MG/1
50 TABLET ORAL EVERY EVENING
Qty: 90 TABLET | Refills: 3 | Status: SHIPPED | OUTPATIENT
Start: 2022-09-20 | End: 2022-10-12 | Stop reason: SDUPTHER

## 2022-09-22 ENCOUNTER — OFFICE VISIT (OUTPATIENT)
Dept: MEDICAL GROUP | Facility: PHYSICIAN GROUP | Age: 77
End: 2022-09-22
Payer: MEDICARE

## 2022-09-22 VITALS
HEART RATE: 59 BPM | SYSTOLIC BLOOD PRESSURE: 100 MMHG | OXYGEN SATURATION: 100 % | TEMPERATURE: 96.5 F | RESPIRATION RATE: 16 BRPM | WEIGHT: 173 LBS | BODY MASS INDEX: 24.77 KG/M2 | HEIGHT: 70 IN | DIASTOLIC BLOOD PRESSURE: 54 MMHG

## 2022-09-22 DIAGNOSIS — F33.1 MODERATE EPISODE OF RECURRENT MAJOR DEPRESSIVE DISORDER (HCC): ICD-10-CM

## 2022-09-22 DIAGNOSIS — I82.401 ACUTE DEEP VEIN THROMBOSIS (DVT) OF RIGHT LOWER EXTREMITY, UNSPECIFIED VEIN (HCC): ICD-10-CM

## 2022-09-22 DIAGNOSIS — E11.22 TYPE 2 DIABETES MELLITUS WITH STAGE 3B CHRONIC KIDNEY DISEASE, WITH LONG-TERM CURRENT USE OF INSULIN (HCC): ICD-10-CM

## 2022-09-22 DIAGNOSIS — K59.00 DYSCHEZIA: ICD-10-CM

## 2022-09-22 DIAGNOSIS — Z79.4 TYPE 2 DIABETES MELLITUS WITH STAGE 3B CHRONIC KIDNEY DISEASE, WITH LONG-TERM CURRENT USE OF INSULIN (HCC): ICD-10-CM

## 2022-09-22 DIAGNOSIS — Z23 NEED FOR VACCINATION: Primary | ICD-10-CM

## 2022-09-22 DIAGNOSIS — N18.32 TYPE 2 DIABETES MELLITUS WITH STAGE 3B CHRONIC KIDNEY DISEASE, WITH LONG-TERM CURRENT USE OF INSULIN (HCC): ICD-10-CM

## 2022-09-22 DIAGNOSIS — I48.0 PAROXYSMAL ATRIAL FIBRILLATION (HCC): ICD-10-CM

## 2022-09-22 DIAGNOSIS — R15.9 FULL INCONTINENCE OF FECES: ICD-10-CM

## 2022-09-22 PROBLEM — F32.A DEPRESSION: Status: RESOLVED | Noted: 2018-01-30 | Resolved: 2022-09-22

## 2022-09-22 PROBLEM — N39.0 RECURRENT UTI: Status: RESOLVED | Noted: 2018-04-26 | Resolved: 2022-09-22

## 2022-09-22 PROBLEM — S76.011A STRAIN OF FLEXOR MUSCLE OF RIGHT HIP: Status: RESOLVED | Noted: 2019-05-20 | Resolved: 2022-09-22

## 2022-09-22 PROBLEM — I77.89 TIBIAL ARTERY DISEASE (HCC): Status: RESOLVED | Noted: 2018-12-27 | Resolved: 2022-09-22

## 2022-09-22 PROBLEM — A41.9 SEPSIS WITHOUT ACUTE ORGAN DYSFUNCTION (HCC): Status: RESOLVED | Noted: 2020-01-21 | Resolved: 2022-09-22

## 2022-09-22 PROBLEM — D64.9 NORMOCYTIC ANEMIA: Status: RESOLVED | Noted: 2020-01-21 | Resolved: 2022-09-22

## 2022-09-22 PROCEDURE — 90662 IIV NO PRSV INCREASED AG IM: CPT | Performed by: INTERNAL MEDICINE

## 2022-09-22 PROCEDURE — 99215 OFFICE O/P EST HI 40 MIN: CPT | Mod: 25 | Performed by: INTERNAL MEDICINE

## 2022-09-22 PROCEDURE — G0008 ADMIN INFLUENZA VIRUS VAC: HCPCS | Performed by: INTERNAL MEDICINE

## 2022-09-22 RX ORDER — METOPROLOL SUCCINATE 100 MG/1
50 TABLET, EXTENDED RELEASE ORAL EVERY EVENING
Qty: 45 TABLET | Refills: 3
Start: 2022-09-22 | End: 2022-10-05

## 2022-09-22 ASSESSMENT — FIBROSIS 4 INDEX: FIB4 SCORE: 1.05

## 2022-09-22 NOTE — LETTER
[employer]  Madison Bunch D.O.  [address]  [place] [address]  Fax: [phone]   Authorization for Release/Disclosure of   Protected Health Information   Name: AV MADRIGAL : 1945 SSN: [id number]   Address: [address]  [address]  [place] [address] Phone:    [phone] (home)    I authorize the entity listed below to release/disclose the PHI below to:   [employer]/Madison Bunch D.O. and Madison Bunch D.O.   Provider or Entity Name:  Dr. [facility]   Address   City, State, [facility]   Phone:      Fax:     Reason for request: continuity of care   Information to be released:    [  ] LAST COLONOSCOPY,  including any PATH REPORT and follow-up  [  ] LAST FIT/COLOGUARD RESULT [  ] LAST DEXA  [  ] LAST MAMMOGRAM  [  ] LAST PAP  [  ] LAST LABS [  ] RETINA EXAM REPORT  [  ] IMMUNIZATION RECORDS  [ x ] Release all info      [ x ] Check here and initial the line next to each item to release ALL health information INCLUDING  _____ Care and treatment for drug and / or alcohol abuse  _____ HIV testing, infection status, or AIDS  _____ Genetic Testing    DATES OF SERVICE OR TIME PERIOD TO BE DISCLOSED: _-____________  I understand and acknowledge that:  * This Authorization may be revoked at any time by you in writing, except if your health information has already been used or disclosed.  * Your health information that will be used or disclosed as a result of you signing this authorization could be re-disclosed by the recipient. If this occurs, your re-disclosed health information may no longer be protected by State or Federal laws.  * You may refuse to sign this Authorization. Your refusal will not affect your ability to obtain treatment.  * This Authorization becomes effective upon signing and will  on (date) __________.      If no date is indicated, this Authorization will  one (1) year from the signature date.    Name: Av  Alejandro Bob    Signature:   Date:     9/22/2022       PLEASE FAX REQUESTED RECORDS BACK TO: [phone]

## 2022-09-23 ENCOUNTER — HOSPITAL ENCOUNTER (OUTPATIENT)
Dept: RADIOLOGY | Facility: MEDICAL CENTER | Age: 77
End: 2022-09-23
Attending: UROLOGY
Payer: MEDICARE

## 2022-09-23 DIAGNOSIS — N28.1 CYST OF KIDNEY, ACQUIRED: ICD-10-CM

## 2022-09-23 PROCEDURE — 74176 CT ABD & PELVIS W/O CONTRAST: CPT

## 2022-09-23 NOTE — ASSESSMENT & PLAN NOTE
Recent problem, follow-up ultrasound planned for this Monday.  Today met with Dr. Noel due to his anemia and a follow-up on his previous cancer and he told him that he could stop taking the Xarelto but they want to hold off until ultrasound results are back.  Much of his decline has been related to initiation of Xarelto but unclear if that is simply coincidence.  We will follow-up next week and hopefully can stop this medicine, will need to go back on Plavix when this is discontinued.

## 2022-09-23 NOTE — ASSESSMENT & PLAN NOTE
Chronic and ongoing problem, he is gone through several medical setbacks in the past 6 months which has affected his mood as well as his energy levels.  Continue fluoxetine 40 mg daily.

## 2022-09-23 NOTE — PROGRESS NOTES
Nurse FREDO did outreach call per PCP request to [facility] office, [phone]  regarding referral that was placed for patient. Representative states they have not yet received referral. States once they receive the referral they recommend patient call to get scheduled for soonest available appointment. Representative states they are able to do some colonoscopies in the office but it will depend on the insurance and the provider. They are currently scheduling several months out.

## 2022-09-23 NOTE — ASSESSMENT & PLAN NOTE
New and decompensated issue, his wife notes that the patient can scream out in excruciating pain when he is trying to have a bowel movement even though the stool itself is soft.  Likely needs anoscopy, will place an urgent referral to GI and will have his chronic care nurse call over to make sure they can do this in clinic.

## 2022-09-23 NOTE — PROGRESS NOTES
Subjective:   Chief Complaint/History of Present Illness:  Av Bob is a 77 y.o. male established patient who presents today to discuss medical problems as listed below. Av is accompanied by his wife, Usha.    Problem   Dyschezia    For the past 6 months or so he has been experiencing significant pain with defecation.  Does not happen every time.  His wife who is his primary caregiver notes there is no hard stool when he has a painful episodes.  He also is having stool incontinence sometimes when he coughs and other times he will pass a full bowel movement and not be aware.  He has not yet seen GI for this problem.     Acute deep vein thrombosis (DVT) of RLE and partial DVT of LLE    US LE (6/2022):    1.  Acute appearing RIGHT posterior tibial vein DVT.   2.  Subacute appearing LEFT common femoral vein DVT and proximal femoral    vein junction DVT.     He had leg pain and ultrasound of bilateral lower extremities was completed which was positive for new DVT, acute in the right posterior tibial vein and subacute in the left common femoral and proximal femoral vein junction as noted above.      He was initiated on Xarelto with a plan to take for 3 months. Repeat US planned for Monday. He saw Dr. Noel of heme/onc who noted he could stop the Xarelto anytime. Plavix has since been placed on hold due to significant hematuria and questionable melena.     (HCC) Moderate episode of recurrent major depressive disorder    He was noted to be depressed after his major medical events in early 2017.  A few years ago his initiated on Prozac and current dose is 40 mg daily.  His wife notes that he is always been emotional and will cry easily at [employer] movies or commercials.  She notes that he still does continue to cry sporadically but it resolves quickly.  No inappropriate laughing or other indication of pseudobulbar affect.      Previously reduced dose from 40 mg to 20 mg but symptoms worsened so went back  up.    Current regimen: fluoxetine 40 mg daily     Paroxysmal Atrial Fibrillation (Hcc)    At the time of his stroke and new non-Hodgkin's lymphoma cancer diagnosis with brain mets and complicated by septic shock related to norovirus while on chemotherapy, he also was found to have an episode of atrial fibrillation on telemetry.      He reports that this diagnosis has continued to follow him around.  He is on rate control medication.     He wore a heart monitor which demonstrated paroxysmal SVT but no atrial fibrillation was noted.  Patient denies any issues with palpitations.  He is not on any antiarrhythmics and no subsequent episodes of atrial fibrillation have been captured.    He follows with Dr. Tucker of cardiology.    Current regimen: Metoprolol succinate 50 mg daily  Previous regimen: Metoprolol succinate 100-200 mg daily     Type 2 Diabetes Mellitus With Stage 3b Chronic Kidney Disease, With Long-Term Current Use of Insulin (formerly [facility])     Latest Reference Range & Units 05/18/22 14:58   Glycohemoglobin 0.0 - 5.6 % 6.6 !     Micro Alb Creat Ratio 0 - 30 mg/g 42 High         Longstanding history of type 2 diabetes on insulin.  Previously followed with endocrinology, Dr. Rasheed.     Current regimen includes Toujeo 5-12 units daily, Humalog 5 units for sugars between 101-150, increase by 2 units if greater than 150.  Correction dosage is 2: 50 greater than 150.    Holding Trulicity since most recent hospitalization in June 2022.    Complicated by retinal involvement, neuropathy, CKDIII-IV, and microalbuminuria.    Due to low running home blood sugar readings well reduce Toujeo to 2 units at night to see if he still requires this medicine and hopefully limit symptomatic hypoglycemia.       Current Medications:  Current Outpatient Medications Ordered in Epic   Medication Sig Dispense Refill    metoprolol SR (TOPROL XL) 100 MG TABLET SR 24 HR Take 0.5 Tablets by mouth every evening. 45 Tablet 3    losartan (COZAAR)  50 MG Tab Take 1 Tablet by mouth every evening. 90 Tablet 3    Insulin Pen Needle (PEN NEEDLES) 32G X 4 MM Misc 1 Each 5 Times a Day. USE FOR INSULIN SHOTS FIVE TIMES DAILY 500 Each 3    Blood Glucose Test Strips 1 Strip 2 times a day. One Touch Ultra 200 Strip 3    gabapentin (NEURONTIN) 100 MG Cap Take 1 Capsule by mouth 3 times a day. 2-3 times day 90 Capsule 0    traZODone (DESYREL) 50 MG Tab Take 1 Tablet by mouth every evening. 30 Tablet 2    allopurinol (ZYLOPRIM) 100 MG Tab Take 100 mg by mouth every evening.      finasteride (PROSCAR) 5 MG Tab Take 5 mg by mouth every evening.      Non Formulary Request Take 250 mg by mouth 2 times a day. Floastor (Probiotic)      Insulin Glargine, 2 Unit Dial, (TOUJEO MAX SOLOSTAR) 300 UNIT/ML Solution Pen-injector Inject 2 Units under the skin at bedtime.      traMADol (ULTRAM) 50 MG Tab Take 50 mg by mouth every 6 hours as needed for Severe Pain.      Multiple Vitamin (MULTIVITAMIN PO) Take 1 Tablet by mouth every morning.      Cholecalciferol (VITAMIN D) 2000 UNIT Tab Take 2,000 Units by mouth every morning.      Potassium Chloride CR (MICRO-K) 8 MEQ Cap CR capsule Take 1 Capsule by mouth every 48 hours. 45 Capsule 3    fluoxetine (PROZAC) 40 MG capsule TAKE 1 CAPSULE BY MOUTH EVERY  Capsule 0    insulin lispro (HUMALOG,ADMELOG) 100 UNIT/ML Solution Pen-injector injection PEN Inject 5 Units under the skin 3 times a day before meals. Humalog 5 units for sugars between 101-150, increase by 2 units if greater than 150.  Correction dosage is 2:50 greater than 150. 9 mL 3    magnesium oxide (MAG-OX) 400 MG Tab tablet Take 800 mg by mouth 2 times a day.      omeprazole (PRILOSEC) 20 MG delayed-release capsule Take 1 Capsule by mouth every day. 30 Capsule 2    rivaroxaban (XARELTO) 20 MG Tab tablet Take 1 Tablet by mouth with dinner. 30 Tablet 2    acetaminophen (TYLENOL) 325 MG Tab Take 2 Tablets by mouth every 6 hours as needed for Mild Pain, Moderate Pain or Fever.  "30 Tablet 0    Insulin Pen Needle 32 G x 4 mm       fenofibrate (TRICOR) 145 MG Tab Take 1 Tablet by mouth every day. 90 Tablet 3    atorvastatin (LIPITOR) 80 MG tablet Take 1 Tablet by mouth every evening. 90 Tablet 3     No current Epic-ordered facility-administered medications on file.          Objective:   Physical Exam:    Vitals: /54 (BP Location: Right arm, Patient Position: Sitting, BP Cuff Size: Adult)   Pulse (!) 59   Temp 35.8 °C (96.5 °F) (Temporal)   Resp 16   Ht 1.778 m (5' 10\")   Wt 78.5 kg (173 lb)   SpO2 100%    BMI: Body mass index is 24.82 kg/m².  Physical Exam  Constitutional:       Comments: Frail appearance, lethargic, weight loss, chronically ill appearing   HENT:      Ears:      Comments: Hearing grossly normal     Mouth/Throat:      Comments: hypophonia  Eyes:      General: No scleral icterus.     Conjunctiva/sclera: Conjunctivae normal.   Cardiovascular:      Rate and Rhythm: Regular rhythm. Bradycardia present.   Pulmonary:      Effort: Pulmonary effort is normal. No respiratory distress.   Musculoskeletal:      Right lower leg: No edema.      Left lower leg: No edema.   Skin:     General: Skin is warm and dry.   Neurological:      Gait: Gait abnormal.   Psychiatric:      Comments: Engages when I speak to him but withdraws during remaining conversation, answers yes/no questions appropriately        Assessment and Plan:   Av is a 77 y.o. male with the following:  Problem List Items Addressed This Visit       Type 2 diabetes mellitus with stage 3b chronic kidney disease, with long-term current use of insulin (HCC)     Chronic and ongoing issue, requires medication adjustment.  Due to low running GFR I worry he is having the Toujeo and this is leading to low blood sugars in the 60-80 range fasting.  We will have them administer Toujeo 2 units at night for the next couple of weeks to see where his fasting blood sugars settle since his wife has had to adjust the amount she " injects on a nightly basis.  Continue with mealtime adjustment with Humalog.  Continue holding Trulicity.  He is down 20 pounds and I suspect that is contributing to decreased needs of insulin in addition to his renal impairment.         Paroxysmal atrial fibrillation (HCC)     Chronic and ongoing problem, now I am concerned with overtreatment.  His heart rate is down in the mid 30s to mid 40s and he is feeling very fatigued.  He has lost at least 20 pounds and I worry that he is having hypoperfusion causing some of his symptoms.  Recommend decreasing metoprolol from 100 mg to 50 mg.  Few years ago he was on 200 mg which and we have been slowly reducing as able.  I will update cardiology to make Dr. Tucker aware.         Relevant Medications    metoprolol SR (TOPROL XL) 100 MG TABLET SR 24 HR    (HCC) Moderate episode of recurrent major depressive disorder     Chronic and ongoing problem, he is gone through several medical setbacks in the past 6 months which has affected his mood as well as his energy levels.  Continue fluoxetine 40 mg daily.         Acute deep vein thrombosis (DVT) of RLE and partial DVT of LLE     Recent problem, follow-up ultrasound planned for this Monday.  Today met with Dr. Noel due to his anemia and a follow-up on his previous cancer and he told him that he could stop taking the Xarelto but they want to hold off until ultrasound results are back.  Much of his decline has been related to initiation of Xarelto but unclear if that is simply coincidence.  We will follow-up next week and hopefully can stop this medicine, will need to go back on Plavix when this is discontinued.         Relevant Medications    metoprolol SR (TOPROL XL) 100 MG TABLET SR 24 HR    Dyschezia     New and decompensated issue, his wife notes that the patient can scream out in excruciating pain when he is trying to have a bowel movement even though the stool itself is soft.  Likely needs anoscopy, will place an urgent  referral to GI and will have his chronic care nurse call over to make sure they can do this in clinic.         Relevant Orders    Referral to Gastroenterology     Other Visit Diagnoses       Need for vaccination    -  Primary    Relevant Orders    Influenza Vaccine, High Dose (65+ Only) (Completed)    Full incontinence of feces        Relevant Orders    Referral to Gastroenterology             RTC: Return in about 3 months (around 12/22/2022).    I spent a total of 48 minutes with record review, exam, communication with the patient, communication with other providers, and documentation of this encounter.    PLEASE NOTE: This dictation was created using voice recognition software. I have made every reasonable attempt to correct obvious errors, but I expect that there are errors of grammar and possibly content that I did not discover before finalizing the note.      Madison Bunch, DO  Geriatric and Internal Medicine  [facility]

## 2022-09-23 NOTE — ASSESSMENT & PLAN NOTE
Chronic and ongoing issue, requires medication adjustment.  Due to low running GFR I worry he is having the Toujeo and this is leading to low blood sugars in the 60-80 range fasting.  We will have them administer Toujeo 2 units at night for the next couple of weeks to see where his fasting blood sugars settle since his wife has had to adjust the amount she injects on a nightly basis.  Continue with mealtime adjustment with Humalog.  Continue holding Trulicity.  He is down 20 pounds and I suspect that is contributing to decreased needs of insulin in addition to his renal impairment.

## 2022-09-23 NOTE — ASSESSMENT & PLAN NOTE
Chronic and ongoing problem, now I am concerned with overtreatment.  His heart rate is down in the mid 30s to mid 40s and he is feeling very fatigued.  He has lost at least 20 pounds and I worry that he is having hypoperfusion causing some of his symptoms.  Recommend decreasing metoprolol from 100 mg to 50 mg.  Few years ago he was on 200 mg which and we have been slowly reducing as able.  I will update cardiology to make Dr. Tucker aware.

## 2022-09-26 ENCOUNTER — HOSPITAL ENCOUNTER (OUTPATIENT)
Dept: RADIOLOGY | Facility: MEDICAL CENTER | Age: 77
End: 2022-09-26
Attending: INTERNAL MEDICINE
Payer: MEDICARE

## 2022-09-26 ENCOUNTER — PATIENT OUTREACH (OUTPATIENT)
Dept: HEALTH INFORMATION MANAGEMENT | Facility: OTHER | Age: 77
End: 2022-09-26
Payer: MEDICARE

## 2022-09-26 DIAGNOSIS — Z79.4 TYPE 2 DIABETES MELLITUS WITH STAGE 3B CHRONIC KIDNEY DISEASE, WITH LONG-TERM CURRENT USE OF INSULIN (HCC): ICD-10-CM

## 2022-09-26 DIAGNOSIS — N18.32 TYPE 2 DIABETES MELLITUS WITH STAGE 3B CHRONIC KIDNEY DISEASE, WITH LONG-TERM CURRENT USE OF INSULIN (HCC): ICD-10-CM

## 2022-09-26 DIAGNOSIS — E11.22 TYPE 2 DIABETES MELLITUS WITH STAGE 3B CHRONIC KIDNEY DISEASE, WITH LONG-TERM CURRENT USE OF INSULIN (HCC): ICD-10-CM

## 2022-09-26 NOTE — PROGRESS NOTES
Nurse CM outreach call for monthly CCM assessment. Spouse answered telephone. Reports patient had recent visit with PCP. Spouse states she heard from GI provider today about scheduling an appointment. States patient is scheduled for November 8th at 1:45pm. Reports pt will be seeing Dr. Peraza. States they are on a waiting list if any sooner appointments available. Spouse states patient hasn't had any further episodes of painful bowel movements in the past several days. Spouse states patient won't be able to have a colonoscopy if it is ordered. States she wouldn't be able to do any bowel prep for patient. Spouse will discuss with GI provider. Nurse spoke to PCP. Patient needs anoscopy. Nurse clarified with GI provider office and informed it was anoscopy that PCP was recommending. Representative states they do anoscopies in office. Will not have to have bowel prep. PCP also mentioned that she could send in a prescription medication for pt if having painful BM. Spouse states they would like RX sent in to have available. Nurse will route message to PCP.     Spouse states patient is scheduled to have US of legs on Friday. Reports patient's grandson helps with getting patient to appointments.     Spouse reports blood sugars have been in a good range. Reports she decreased Toujeo to 2 units at bedtime. Reports blood sugars have been in the 80-90 range fasting every morning. States she hasn't noticed patient having any low readings. Per spouse patient is not able to tell her when he is having a low. Nurse reviewed s/s of hypoglycemia. Spouse states she does treat low's with orange juice. Spouse states patient doesn't have many low readings that she is aware of. Spouse states she does check blood sugars on a regular basis. Spouse is able to teach back appropriate treatment for low blood sugar.    Nurse reviewed care plan for CCM. Reviewed falls. Spouse states patient had only one fall in the past month and states he  slipped off bed. Reports no injury. Spouse states it is difficult to transfer patient. Relies on family to help her get patient to appointments for assist with transfers.     Plan:  Nurse will follow-up in one month. Sooner if needed. Spouse has CM contact and can call [phone] if any questions or concerns. Update routed to PCP.

## 2022-09-27 ENCOUNTER — TELEPHONE (OUTPATIENT)
Dept: CARDIOLOGY | Facility: MEDICAL CENTER | Age: 77
End: 2022-09-27
Payer: MEDICARE

## 2022-09-27 DIAGNOSIS — K59.00 DYSCHEZIA: ICD-10-CM

## 2022-09-27 RX ORDER — HYDROCORTISONE ACETATE 25 MG/1
25 SUPPOSITORY RECTAL EVERY 12 HOURS
Qty: 12 SUPPOSITORY | Refills: 1 | Status: SHIPPED | OUTPATIENT
Start: 2022-09-27 | End: 2023-05-03

## 2022-09-27 RX ORDER — LIDOCAINE HYDROCHLORIDE 20 MG/ML
1 JELLY TOPICAL PRN
Qty: 30 ML | Refills: 1 | Status: SHIPPED | OUTPATIENT
Start: 2022-09-27 | End: 2023-05-03

## 2022-09-27 NOTE — TELEPHONE ENCOUNTER
Spoke to Janae VINSON , added to wait list for BLANCO and ROSA.     Called patient mobile number and spoke to the patient. Last night HR 67, then he handed patient wife the phone, she does not see readings that low.   Currently taking 100mg metoprolol in the PM and 50mg losartan, on xarelto 20mg daily. Lower in the AM and then came up in the PM.   Denies any symptoms.   Current Vitals: 152/81, 55  Advised will notify [facility] and see if there are other recommendations and then will let him know. Answered all questions and concerns, appreciative of call.     BLANCO: HR 50-60's. Still taking metoprolol 100mg daily. Added to wait list, any other recommendations? Thank you!

## 2022-09-27 NOTE — TELEPHONE ENCOUNTER
Can we check on Av' heart rates? If still low, let's have him come in for an EKG only visit, and if there is an earlier next available with JA or myself let's schedule that. Thanks!

## 2022-09-27 NOTE — PROGRESS NOTES
Patient's spouse notified of medications sent to pharmacy for rectal pain.  Spouse will  when medications ready.

## 2022-09-27 NOTE — PROGRESS NOTES
2 different medicine to try for rectal pain sent to pharmacy. One is a suppository with a steroid that helps with hemorrhoid type pain and the other is lidocaine which is a numbing medicine that can be applied for short term relief.

## 2022-09-27 NOTE — TELEPHONE ENCOUNTER
----- Message from Madison Bunch D.O. sent at 9/22/2022  7:06 PM PDT -----  Good evening,    I saw Av today for follow up after being out on maternity leave. He is looking pretty rough, he is down about 20 lbs and his HR was hanging in the 30-40 range with a low running BP. I told his wife to cut his metoprolol in half down to 50 mg and we may need to reduce his losartan as well. I am hoping to get him off xarelto next week pending his follow up LE US and then will have them add back the Plavix. I am going to try to dial back on medicines as I think his blood sugar is also over treated and he is looking more frail. Not sure when you have follow up with them next but wanted to give you an update.    Marylou Diaz  Geriatrics

## 2022-09-30 ENCOUNTER — HOSPITAL ENCOUNTER (OUTPATIENT)
Dept: RADIOLOGY | Facility: MEDICAL CENTER | Age: 77
End: 2022-09-30
Attending: INTERNAL MEDICINE
Payer: MEDICARE

## 2022-09-30 DIAGNOSIS — D50.8 IRON DEFICIENCY ANEMIA SECONDARY TO INADEQUATE DIETARY IRON INTAKE: ICD-10-CM

## 2022-09-30 DIAGNOSIS — D64.9 ANEMIA, UNSPECIFIED TYPE: ICD-10-CM

## 2022-09-30 DIAGNOSIS — C83.33 RETICULOSARCOMA OF INTRA-ABDOMINAL LYMPH NODES (HCC): ICD-10-CM

## 2022-09-30 DIAGNOSIS — I82.591 CHRONIC EMBOLISM AND THROMBOSIS OF OTHER SPECIFIED DEEP VEIN OF RIGHT LOWER EXTREMITY (HCC): ICD-10-CM

## 2022-09-30 PROCEDURE — 93970 EXTREMITY STUDY: CPT

## 2022-10-04 ENCOUNTER — PATIENT MESSAGE (OUTPATIENT)
Dept: CARDIOLOGY | Facility: MEDICAL CENTER | Age: 77
End: 2022-10-04
Payer: MEDICARE

## 2022-10-04 DIAGNOSIS — I10 ESSENTIAL HYPERTENSION, BENIGN: ICD-10-CM

## 2022-10-05 DIAGNOSIS — I48.0 PAROXYSMAL ATRIAL FIBRILLATION (HCC): ICD-10-CM

## 2022-10-05 RX ORDER — METOPROLOL SUCCINATE 100 MG/1
100 TABLET, EXTENDED RELEASE ORAL EVERY EVENING
Qty: 90 TABLET | Refills: 3
Start: 2022-10-05 | End: 2022-10-12

## 2022-10-05 NOTE — PROGRESS NOTES
BP running high, they had not yet reduced his metoprolol prescription, they are going to try adding a 2nd dose of losartan so he takes it BID.

## 2022-10-10 ENCOUNTER — PATIENT MESSAGE (OUTPATIENT)
Dept: CARDIOLOGY | Facility: MEDICAL CENTER | Age: 77
End: 2022-10-10
Payer: MEDICARE

## 2022-10-10 NOTE — TELEPHONE ENCOUNTER
Let's double check with Av and see his current meds and heart rates as well as Bps, hopefully everything has been stable and then we will need no changes. Thanks!

## 2022-10-12 ENCOUNTER — HOSPITAL ENCOUNTER (OUTPATIENT)
Dept: LAB | Facility: MEDICAL CENTER | Age: 77
End: 2022-10-12
Attending: INTERNAL MEDICINE
Payer: MEDICARE

## 2022-10-12 DIAGNOSIS — I10 ESSENTIAL HYPERTENSION: ICD-10-CM

## 2022-10-12 DIAGNOSIS — D64.9 ANEMIA, UNSPECIFIED TYPE: ICD-10-CM

## 2022-10-12 DIAGNOSIS — N18.31 STAGE 3A CHRONIC KIDNEY DISEASE: ICD-10-CM

## 2022-10-12 LAB
ANION GAP SERPL CALC-SCNC: 7 MMOL/L (ref 7–16)
BUN SERPL-MCNC: 28 MG/DL (ref 8–22)
CALCIUM SERPL-MCNC: 8.8 MG/DL (ref 8.5–10.5)
CHLORIDE SERPL-SCNC: 104 MMOL/L (ref 96–112)
CO2 SERPL-SCNC: 26 MMOL/L (ref 20–33)
CREAT SERPL-MCNC: 1.1 MG/DL (ref 0.5–1.4)
ERYTHROCYTE [DISTWIDTH] IN BLOOD BY AUTOMATED COUNT: 62.7 FL (ref 35.9–50)
GFR SERPLBLD CREATININE-BSD FMLA CKD-EPI: 70 ML/MIN/1.73 M 2
GLUCOSE SERPL-MCNC: 87 MG/DL (ref 65–99)
HCT VFR BLD AUTO: 37.6 % (ref 42–52)
HGB BLD-MCNC: 11.8 G/DL (ref 14–18)
MCH RBC QN AUTO: 25.3 PG (ref 27–33)
MCHC RBC AUTO-ENTMCNC: 31.4 G/DL (ref 33.7–35.3)
MCV RBC AUTO: 80.7 FL (ref 81.4–97.8)
PLATELET # BLD AUTO: 506 K/UL (ref 164–446)
PMV BLD AUTO: 10.3 FL (ref 9–12.9)
POTASSIUM SERPL-SCNC: 4.3 MMOL/L (ref 3.6–5.5)
RBC # BLD AUTO: 4.66 M/UL (ref 4.7–6.1)
SODIUM SERPL-SCNC: 137 MMOL/L (ref 135–145)
WBC # BLD AUTO: 13.9 K/UL (ref 4.8–10.8)

## 2022-10-12 PROCEDURE — 80048 BASIC METABOLIC PNL TOTAL CA: CPT

## 2022-10-12 PROCEDURE — 36415 COLL VENOUS BLD VENIPUNCTURE: CPT

## 2022-10-12 PROCEDURE — 85027 COMPLETE CBC AUTOMATED: CPT

## 2022-10-12 RX ORDER — LOSARTAN POTASSIUM 50 MG/1
50 TABLET ORAL 2 TIMES DAILY
Qty: 180 TABLET | Refills: 2 | Status: SHIPPED | OUTPATIENT
Start: 2022-10-12 | End: 2023-08-03

## 2022-10-12 RX ORDER — CARVEDILOL 6.25 MG/1
6.25 TABLET ORAL 2 TIMES DAILY WITH MEALS
Qty: 180 TABLET | Refills: 2 | Status: SHIPPED | OUTPATIENT
Start: 2022-10-12 | End: 2022-11-03

## 2022-10-12 NOTE — PATIENT COMMUNICATION
losartan (COZAAR) 50 MG Tab 180 Tablet 2/2 10/12/2022     Sig - Route: Take 1 Tablet by mouth 2 times a day. - Oral    Sent to pharmacy as: Losartan Potassium 50 MG Oral Tablet (COZAAR)    Notes to Pharmacy: Dosage change    Cosign for Ordering: Required by Osvaldo Tucker M.D.    E-Prescribing Status: Receipt confirmed by pharmacy (10/12/2022 11:12 AM PDT)      [id number] - [place], [place] - [id number] [facility]           carvedilol (COREG) 6.25 MG Tab 180 Tablet 2/2 10/12/2022     Sig - Route: Take 1 Tablet by mouth 2 times a day with meals. - Oral    Sent to pharmacy as: Carvedilol 6.25 MG Oral Tablet (COREG)    Cosign for Ordering: Required by Osvaldo Tucker M.D.    E-Prescribing Status: Receipt confirmed by pharmacy (10/12/2022 11:12 AM PDT)      [id number] - [place], [place] - [address] [facility]

## 2022-10-12 NOTE — PROGRESS NOTES
Let's call Av for this as there's a lot of changes.     Let's increase his losartan to 50mg twice a day.     Let's switch his metoprolol to coreg 6.25mg PO bid which should improve his blood pressure and also decrease his risk of bradycardia.     Check back in a week after these changes, we can consider restarting norvasc or starting tenex if this doesn't work. Thanks!

## 2022-10-12 NOTE — PATIENT COMMUNICATION
-------------------------------  Called pt [phone]  S/w pt and wife Usha.   Relayed JM recommendations. Pt and wife in agreement. Pharmacy verified. Pt and wife verbalized understanding.          MAR updated  New Rx's sent to pharmacy per pt request

## 2022-10-17 ENCOUNTER — OFFICE VISIT (OUTPATIENT)
Dept: PHYSICAL MEDICINE AND REHAB | Facility: MEDICAL CENTER | Age: 77
End: 2022-10-17
Payer: MEDICARE

## 2022-10-17 VITALS
WEIGHT: 173 LBS | TEMPERATURE: 96.5 F | HEART RATE: 53 BPM | HEIGHT: 72 IN | OXYGEN SATURATION: 99 % | DIASTOLIC BLOOD PRESSURE: 60 MMHG | SYSTOLIC BLOOD PRESSURE: 112 MMHG | BODY MASS INDEX: 23.43 KG/M2

## 2022-10-17 DIAGNOSIS — G89.29 CHRONIC RIGHT-SIDED LOW BACK PAIN WITH RIGHT-SIDED SCIATICA: ICD-10-CM

## 2022-10-17 DIAGNOSIS — M54.41 CHRONIC RIGHT-SIDED LOW BACK PAIN WITH RIGHT-SIDED SCIATICA: ICD-10-CM

## 2022-10-17 DIAGNOSIS — M54.16 LUMBAR RADICULOPATHY: ICD-10-CM

## 2022-10-17 DIAGNOSIS — M47.816 LUMBAR SPONDYLOSIS: ICD-10-CM

## 2022-10-17 PROCEDURE — 99214 OFFICE O/P EST MOD 30 MIN: CPT | Performed by: PHYSICAL MEDICINE & REHABILITATION

## 2022-10-17 RX ORDER — GABAPENTIN 100 MG/1
100 CAPSULE ORAL 3 TIMES DAILY PRN
Qty: 90 CAPSULE | Refills: 5 | Status: SHIPPED | OUTPATIENT
Start: 2022-10-17 | End: 2023-10-16

## 2022-10-17 ASSESSMENT — FIBROSIS 4 INDEX: FIB4 SCORE: 1.03

## 2022-10-17 ASSESSMENT — PAIN SCALES - GENERAL: PAINLEVEL: 1=MINIMAL PAIN

## 2022-10-17 ASSESSMENT — PATIENT HEALTH QUESTIONNAIRE - PHQ9: CLINICAL INTERPRETATION OF PHQ2 SCORE: 0

## 2022-10-18 NOTE — PROGRESS NOTES
Follow up patient note  Interventional spine and sports physiatry, Physical medicine rehabilitation      Chief complaint:   Chief Complaint   Patient presents with    Follow-Up     Lumbar radiculopathy          HISTORY    Please see new patient note by Dr Bennett,  for more details.     HPI  Patient identification: Av Bob ,  1945,   With Diagnoses of Lumbar radiculopathy, Lumbar spondylosis, and Chronic right-sided low back pain with right-sided sciatica were pertinent to this visit.     Procedures:  2020 right L3-4 and L4-5 transforaminal epidural steroid injection 95% pain relief and follow-up visit  3/29/2021 right L3-4 and L4-5 transforaminal epidural steroid injection 90% improved postprocedure.      The patient had excellent pain relief after the epidural steroid injection and continues to have pain relief.  His pain is now 1 out of 10 intensity radiating down the right leg.  There is intermittent numbness and tingling but this is not causing dysesthesias.  He has full range of motion of the lumbar spine.  No pain in the back.  He is very happy with the results.  He is using gabapentin intermittently but has had a decreased need of the medication.         ROS Red Flags :   Fever, Chills, Sweats: Denies  Involuntary Weight Loss: Denies  Saddle Anesthesia: Denies        PMHx:   Past Medical History:   Diagnosis Date    (HCC) Chronic ulcer of right lower extremity with fat layer exposed 2018    Acute cystitis without hematuria 2019    Acute on chronic renal failure (HCC) 2020    Acute prostatitis 2022    New problem of prostatitis identified by [facility] health when patient developed hematuria with rectal pain.  He followed up with his urology PA and was placed back on Bactrim.  He was recently on Bactrim and has a history of recurrent urinary tract infections related to neurogenic bladder from prior stroke.  Rectal pain is dissipated however he continues to have  hematuria.  He had associated CATA o    Acute respiratory failure with hypoxia ([facility]) 5/20/2017    CAD (coronary artery disease)     GIOVANNA to RCA in '97, GIOVANNA X2 to LAD and GIOVANNA X2 to OM in '09    Cancer ([facility])     2017; chemo lympoma non hodgkins lymphoma    Cataract     dez iol    Cerebrovascular accident (CVA) ([facility]) 12/30/2016    Left arm weakness  etiology of stroke not established, lymphoma discovered on MRI evaluation of stroke, L hemiparesis much worse after acute infectious illness in mid 2017, but no specific diagnosed recurrent neurological etiology, all at [facility]    Chickenpox     CKD (chronic kidney disease) stage 3, GFR 30-59 ml/min ([facility])     Controlled gout 2014    Coronary atherosclerosis of native coronary artery     S/P PTCA (percutaneous transluminal coronary angioplasty), RCA, 5/1997, patent on cath 7/10/2009 at the time of interventions on his left anterior descending and circumflex coronary arteries    Daytime sleepiness     Depression     Diabetes ([facility])     Diarrhea 7/2/2019    Difficulty swallowing     Dysphagia 3/15/2021    He reports progressive worsening of his swallow function.  He notices at least once per week he is choking and coughing, can be with pills or can be with food.  The episodes are quite frightening and he takes a bit of time for him to recover.  He has a prior history of stroke and recalls working with speech-language pathology at that time but does not remember if he did any formal esophagrams or s    Enterococcal septicemia ([facility]) 8/12/2017    Exposure to SARS-associated coronavirus 10/13/2021    He reports viral illness last week with runny nose, congestion, productive cough, and wheezing.  He went to a play of his grandson and may have been exposed to Covid.  He was feeling very bad last Friday and over the weekend and now is at least 50% better.    Roberto hematuria 1/29/2020    Frequent urination     GERD (gastroesophageal reflux disease)      Malay measles     History of renal calculi 11/19/2019    Hordeolum externum of left lower eyelid 9/14/2021    He reports to clinic with 3 weeks of erythema and pain of the left lower eyelid. No clear inciting cause. Reports no globe pain. Lower > upper eyelid swelling and erythema. After 1.5 weeks had drainage of purulence from the lower medial eyelid. No photosensitivity. Using warm compresses. No changes in visual acuity. 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 Barry    Normocytic hypochromic anemia 5/20/2017    NSTEMI (non-ST elevated myocardial infarction) ([facility]) 07/18/2017    complicating UTI with sepsis    Pain 06/30/2021    right leg foot    Peripheral vascular disease ([facility])     Polyneuropathy in diabetes(357.2) 9/11/2013    Pyelonephritis 5/24/2022    Av had abrupt onset of illness starting on Sunday.  He felt unwell and then had projectile vomiting x3 episodes after dinner that evening.  He had associated nausea but no overt abdominal pain.  He has continued to have anorexia and is having difficulty with his p.o. intake.  He did get in some fruit in a month and yesterday and about 50 to 60 ounces of water.  He has chronic urinary retention    Renal cyst 1/29/2020    Renal mass 1/21/2020    Septic shock ([facility]) 5/20/2017    Skin ulcer of calf ([facility]) 2015    Right, Dr. Terry and wound care    Stage 3b chronic kidney disease ([facility])  8/25/2016     Latest Reference Range & Units 04/29/22 11:14 Bun 8 - 22 mg/dL 33 (H) Creatinine 0.50 - 1.40 mg/dL 1.55 (H) GFR (CKD-EPI) >60 mL/min/1.73 m 2 46 ! [1]  He has a history of chronic kidney disease related to nephrolithiasis, recurrent UTIs, and BPH as well as history of hypertension and diabetes.  He is following with nephrology, Dr. Jarvis and urology, Dr. Barry.  Spironolactone was discontinued due     Stroke (HCC) 2016    left sided weakness    Toenail avulsion, initial encounter- left foot 3rd digit 7/12/2021    They report that his toenail spontaneously came off last week.  He does not recall specific injury or any pain.  His significant other saw the nail on the floor with dried blood on his foot.  She cleaned the wound and covered it with gauze.  On follow-up in clinic today the gauze has stuck to the wound bed but with saline was fairly easy to remove the dried gauze.  There was scant amount of bright    Urinary bladder disorder     Urinary incontinence     pt wears pads    UTI (urinary tract infection) 5/28/2022    Weakness     Wound of left leg 2012    Requiring surgery and debridment, Dr. Moore       PSHx:   Past Surgical History:   Procedure Laterality Date    SC INJ LUMBAR/SACRAL,W/ IMAGING  7/27/2021    Procedure: Lumbar L5-S1 interlaminar epidural steroid injection;  Surgeon: Harpal Bennett M.D.;  Location: [facility];  Service: Pain Management    SC UPPER GI ENDOSCOPY,DIAGNOSIS N/A 7/21/2021    Procedure: GASTROSCOPY - AND DILATION;  Surgeon: Neville Huerta M.D.;  Location: SURGERY [facility] DAY [facility];  Service: Gastroenterology    SC UPPER GI ENDOSCOPY,BIOPSY N/A 7/21/2021    Procedure: GASTROSCOPY, WITH BIOPSY;  Surgeon: Neville Huerta M.D.;  Location: SURGERY SAME DAY [facility];  Service: Gastroenterology    LUMBAR TRANSFORAMINAL EPIDURAL STEROID INJECTION Right 3/29/2021    Procedure: RIGHT L3-4 and L4-5 transforaminal epidural steroid injection with fluoroscopic  guidance;  Surgeon: Harpal Bennett M.D.;  Location: [facility];  Service: Pain Management    LUMBAR TRANSFORAMINAL EPIDURAL STEROID INJECTION Right 11/2/2020    Procedure: INJECTION, STEROID, SPINE, LUMBAR, EPIDURAL, TRANSFORAMINAL APPROACH;  Surgeon: Harpal Bennett M.D.;  Location: [facility];  Service: Pain Management    CYSTOSCOPY STENT PLACEMENT Left 2/12/2018    Procedure: CYSTOSCOPY  ;  Surgeon: Rey Barry M.D.;  Location: SURGERY [facility];  Service: Urology    URETEROSCOPY Left 2/12/2018    Procedure: URETEROSCOPY- FLEXIBLE  ;  Surgeon: Rey Barry M.D.;  Location: SURGERY [facility];  Service: Urology    LASERTRIPSY Left 2/12/2018    Procedure: LASERTRIPSY - LITHO  ;  Surgeon: Rey Barry M.D.;  Location: SURGERY [facility];  Service: Urology    WOUND CLOSURE GENERAL  4/3/2012    Performed by GERHARD GILBERT at SURGERY SAME DAY [facility]    ZZZ CARDIAC CATH  2009    Stents to LAD, Om    DAGOBERTO BY LAPAROSCOPY  1998    ANGIOPLASTY  1997    RCA followed by other stents as noted above.     ZZZ CARDIAC CATH  1997    stent RCA    CATARACT EXTRACTION WITH IOL      bilateral    LITHOTRIPSY      TONSILLECTOMY      TONSILLECTOMY AND ADENOIDECTOMY         Family history   Family History   Problem Relation Age of Onset    Heart Disease Father         CAD    Diabetes Father     Cancer Mother     Psychiatric Illness Mother         Depression    Depression Mother     Kidney stones Brother     Heart Disease Brother     Psychiatric Illness Brother         Depression    Depression Brother     Suicide Attempts Other     Psychiatric Illness Other         autism         Medications:   Outpatient Medications Marked as Taking for the 10/17/22 encounter (Office Visit) with Harpal Bennett M.D.   Medication Sig Dispense Refill    gabapentin (NEURONTIN) 100 MG Cap Take 1 Capsule by mouth 3 times a day as needed (pain). 90 Capsule 5    losartan (COZAAR) 50 MG Tab  Take 1 Tablet by mouth 2 times a day. 180 Tablet 2    carvedilol (COREG) 6.25 MG Tab Take 1 Tablet by mouth 2 times a day with meals. 180 Tablet 2    allopurinol (ZYLOPRIM) 100 MG Tab Take 1 Tablet by mouth every evening. 100 Tablet 3    hydrocortisone (ANUSOL-HC) 25 MG Suppos Insert 1 Suppository into the rectum every 12 hours. 12 Suppository 1    lidocaine 2 % Gel Apply 1 Application topically as needed (rectal pain). 30 mL 1    Insulin Pen Needle (PEN NEEDLES) 32G X 4 MM Misc 1 Each 5 Times a Day. USE FOR INSULIN SHOTS FIVE TIMES DAILY 500 Each 3    Blood Glucose Test Strips 1 Strip 2 times a day. One Touch Ultra 200 Strip 3    finasteride (PROSCAR) 5 MG Tab Take 5 mg by mouth every evening.      Non Formulary Request Take 250 mg by mouth 2 times a day. Floastor (Probiotic)      Insulin Glargine, 2 Unit Dial, (TOUJEO MAX SOLOSTAR) 300 UNIT/ML Solution Pen-injector Inject 2 Units under the skin at bedtime.      traMADol (ULTRAM) 50 MG Tab Take 50 mg by mouth every 6 hours as needed for Severe Pain.      Multiple Vitamin (MULTIVITAMIN PO) Take 1 Tablet by mouth every morning.      Cholecalciferol (VITAMIN D) 2000 UNIT Tab Take 2,000 Units by mouth every morning.      Potassium Chloride CR (MICRO-K) 8 MEQ Cap CR capsule Take 1 Capsule by mouth every 48 hours. 45 Capsule 3    fluoxetine (PROZAC) 40 MG capsule TAKE 1 CAPSULE BY MOUTH EVERY  Capsule 0    insulin lispro (HUMALOG,ADMELOG) 100 UNIT/ML Solution Pen-injector injection PEN Inject 5 Units under the skin 3 times a day before meals. Humalog 5 units for sugars between 101-150, increase by 2 units if greater than 150.  Correction dosage is 2:50 greater than 150. 9 mL 3    magnesium oxide (MAG-OX) 400 MG Tab tablet Take 800 mg by mouth 2 times a day.      omeprazole (PRILOSEC) 20 MG delayed-release capsule Take 1 Capsule by mouth every day. 30 Capsule 2    acetaminophen (TYLENOL) 325 MG Tab Take 2 Tablets by mouth every 6 hours as needed for Mild Pain,  Moderate Pain or Fever. 30 Tablet 0    Insulin Pen Needle 32 G x 4 mm       fenofibrate (TRICOR) 145 MG Tab Take 1 Tablet by mouth every day. 90 Tablet 3    atorvastatin (LIPITOR) 80 MG tablet Take 1 Tablet by mouth every evening. 90 Tablet 3        Current Outpatient Medications on File Prior to Visit   Medication Sig Dispense Refill    losartan (COZAAR) 50 MG Tab Take 1 Tablet by mouth 2 times a day. 180 Tablet 2    carvedilol (COREG) 6.25 MG Tab Take 1 Tablet by mouth 2 times a day with meals. 180 Tablet 2    allopurinol (ZYLOPRIM) 100 MG Tab Take 1 Tablet by mouth every evening. 100 Tablet 3    hydrocortisone (ANUSOL-HC) 25 MG Suppos Insert 1 Suppository into the rectum every 12 hours. 12 Suppository 1    lidocaine 2 % Gel Apply 1 Application topically as needed (rectal pain). 30 mL 1    Insulin Pen Needle (PEN NEEDLES) 32G X 4 MM Misc 1 Each 5 Times a Day. USE FOR INSULIN SHOTS FIVE TIMES DAILY 500 Each 3    Blood Glucose Test Strips 1 Strip 2 times a day. One Touch Ultra 200 Strip 3    finasteride (PROSCAR) 5 MG Tab Take 5 mg by mouth every evening.      Non Formulary Request Take 250 mg by mouth 2 times a day. Floastor (Probiotic)      Insulin Glargine, 2 Unit Dial, (TOUJEO MAX SOLOSTAR) 300 UNIT/ML Solution Pen-injector Inject 2 Units under the skin at bedtime.      traMADol (ULTRAM) 50 MG Tab Take 50 mg by mouth every 6 hours as needed for Severe Pain.      Multiple Vitamin (MULTIVITAMIN PO) Take 1 Tablet by mouth every morning.      Cholecalciferol (VITAMIN D) 2000 UNIT Tab Take 2,000 Units by mouth every morning.      Potassium Chloride CR (MICRO-K) 8 MEQ Cap CR capsule Take 1 Capsule by mouth every 48 hours. 45 Capsule 3    fluoxetine (PROZAC) 40 MG capsule TAKE 1 CAPSULE BY MOUTH EVERY  Capsule 0    insulin lispro (HUMALOG,ADMELOG) 100 UNIT/ML Solution Pen-injector injection PEN Inject 5 Units under the skin 3 times a day before meals. Humalog 5 units for sugars between 101-150, increase by 2  units if greater than 150.  Correction dosage is 2:50 greater than 150. 9 mL 3    magnesium oxide (MAG-OX) 400 MG Tab tablet Take 800 mg by mouth 2 times a day.      omeprazole (PRILOSEC) 20 MG delayed-release capsule Take 1 Capsule by mouth every day. 30 Capsule 2    acetaminophen (TYLENOL) 325 MG Tab Take 2 Tablets by mouth every 6 hours as needed for Mild Pain, Moderate Pain or Fever. 30 Tablet 0    Insulin Pen Needle 32 G x 4 mm       fenofibrate (TRICOR) 145 MG Tab Take 1 Tablet by mouth every day. 90 Tablet 3    atorvastatin (LIPITOR) 80 MG tablet Take 1 Tablet by mouth every evening. 90 Tablet 3    rivaroxaban (XARELTO) 20 MG Tab tablet Take 1 Tablet by mouth with dinner. 30 Tablet 2     No current facility-administered medications on file prior to visit.         Allergies:   Allergies   Allergen Reactions    Diphenhydramine Anxiety     Pt is able to tolerate  Mg benadryl with less anxiety    Lorazepam Unspecified     Disorientation    Spironolactone Unspecified     Acute kidney injury    Ciprofloxacin Rash     Rash,stomach ache       Social Hx:   Social History     Socioeconomic History    Marital status:      Spouse name: Not on file    Number of children: Not on file    Years of education: Not on file    Highest education level: Not on file   Occupational History    Not on file   Tobacco Use    Smoking status: Never    Smokeless tobacco: Never   Vaping Use    Vaping Use: Never used   Substance and Sexual Activity    Alcohol use: Never    Drug use: Never    Sexual activity: Not Currently     Partners: Female     Comment: , one daughter, 2 grands   Other Topics Concern     Service Yes    Blood Transfusions No    Caffeine Concern No    Occupational Exposure No    Hobby Hazards No    Sleep Concern Yes    Stress Concern No    Weight Concern No    Special Diet No    Back Care No    Exercise Yes    Bike Helmet No    Seat Belt Yes    Self-Exams Yes   Social History Narrative    Av is from  [place], [place] and raised in [place]. He has been in [place] since 2004. 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  He has been  to Usha since 2003, they met through a mutual friend. He has a daughter (Kalina) and a step son (Jimi).     Social Determinants of Health     Financial Resource Strain: Low Risk     Difficulty of Paying Living Expenses: Not hard at all   Food Insecurity: No Food Insecurity    Worried About Running Out of Food in the Last Year: Never true    Ran Out of Food in the Last Year: Never true   Transportation Needs: Not on file   Physical Activity: Inactive    Days of Exercise per Week: 0 days    Minutes of Exercise per Session: 0 min   Stress: No Stress Concern Present    Feeling of Stress : Only a little   Social Connections: Not on file   Intimate Partner Violence: Not on file   Housing Stability: Unknown    Unable to Pay for Housing in the Last Year: No    Number of Places Lived in the Last Year: Not on file    Unstable Housing in the Last Year: No         EXAMINATION     Physical Exam:   Vitals: /60 (BP Location: Right arm, Patient Position: Sitting, BP Cuff Size: Adult)   Pulse (!) 53   Temp 35.8 °C (96.5 °F) (Temporal)   Ht 1.829 m (6')   Wt 78.5 kg (173 lb)   SpO2 99%     Constitutional:   Body Habitus: Body mass index is 23.46 kg/m².  Cooperation: Fully cooperates with exam  Appearance: Well-groomed no disheveled    Respiratory-  breathing comfortable on room air, no audible wheezing  Cardiovascular- capillary refills less than 2 seconds. No lower extremity edema is noted.   Psychiatric- alert and oriented ×3. Normal affect.    MSK and Neuro: -    Strength is 5 out of 5 in the right lower extremity with sensation intact.  Straight leg raise  maneuver is negative.  There is no tenderness palpation throughout the spine.      MEDICAL DECISION MAKING    DATA    Labs:   Lab Results   Component Value Date/Time    SODIUM 137 10/12/2022 02:04 PM    POTASSIUM 4.3 10/12/2022 02:04 PM    CHLORIDE 104 10/12/2022 02:04 PM    CO2 26 10/12/2022 02:04 PM    GLUCOSE 87 10/12/2022 02:04 PM    BUN 28 (H) 10/12/2022 02:04 PM    CREATININE 1.10 10/12/2022 02:04 PM    CREATININE 1.4 05/28/2008 05:42 PM    BUNCREATRAT 10 03/27/2017 02:11 PM        Lab Results   Component Value Date/Time    PROTHROMBTM 13.6 01/19/2022 12:50 PM    INR 1.13 01/19/2022 12:50 PM        Lab Results   Component Value Date/Time    WBC 13.9 (H) 10/12/2022 02:04 PM    RBC 4.66 (L) 10/12/2022 02:04 PM    HEMOGLOBIN 11.8 (L) 10/12/2022 02:04 PM    HEMATOCRIT 37.6 (L) 10/12/2022 02:04 PM    MCV 80.7 (L) 10/12/2022 02:04 PM    MCH 25.3 (L) 10/12/2022 02:04 PM    MCHC 31.4 (L) 10/12/2022 02:04 PM    MPV 10.3 10/12/2022 02:04 PM    NEUTSPOLYS 81.10 (H) 08/25/2022 01:47 PM    LYMPHOCYTES 9.50 (L) 08/25/2022 01:47 PM    MONOCYTES 6.10 08/25/2022 01:47 PM    EOSINOPHILS 2.10 08/25/2022 01:47 PM    BASOPHILS 0.30 08/25/2022 01:47 PM    HYPOCHROMIA 1+ 03/21/2012 01:08 PM        Lab Results   Component Value Date/Time    HBA1C 6.6 (H) 06/01/2022 06:30 AM          Imaging:   I personally reviewed following images    I reviewed the fluoroscopic images from 11/2/2020 showing successful right L3-4 and L4-5 transforaminal epidural steroid injection.  I reviewed these with the patient at the visit on 11/23/2020.    I reviewed the following radiology reports                                Results for orders placed during the hospital encounter of 09/24/20   MR-LUMBAR SPINE-W/O    Impression 1.  Multifocal degenerative disease in the lumbar spine as described above.  2.  Bilateral hydronephrosis and hydroureter. This is noted on the previous CT scan dated 1/21/2020.  3.  There is small focus of sclerosis in the L4  vertebral body. This is new since the previous study. This finding likely secondary to the degeneration. However if there is a history of carcinoma the possibility of sclerotic metastasis cannot be   excluded.                                                                  Results for orders placed in visit on 20   DX-CHEST-2 VIEWS    Impression No acute cardiopulmonary disease.                                                                                   DIAGNOSIS   Visit Diagnoses     ICD-10-CM   1. Lumbar radiculopathy  M54.16   2. Lumbar spondylosis  M47.816   3. Chronic right-sided low back pain with right-sided sciatica  M54.41    G89.29         ASSESSMENT and PLAN:     Av Bob  1945 male      Av was seen today for follow-up.    Diagnoses and all orders for this visit:    Lumbar radiculopathy  -     gabapentin (NEURONTIN) 100 MG Cap; Take 1 Capsule by mouth 3 times a day as needed (pain).    Lumbar spondylosis  -     gabapentin (NEURONTIN) 100 MG Cap; Take 1 Capsule by mouth 3 times a day as needed (pain).    Chronic right-sided low back pain with right-sided sciatica  -     gabapentin (NEURONTIN) 100 MG Cap; Take 1 Capsule by mouth 3 times a day as needed (pain).      The above issues are stable.  I discussed case with the patient's family member Usha who was at today's visit and assisted with the HPI.  Back pain is stable and well-controlled he no longer has a pain radiating down the right leg.  He does get numbness and tingling in this distribution but this is intermittent and not causing functional deficits for him.  We discussed that he can use the gabapentin as needed and given that this is a low dose he can stop taking the gabapentin if he wishes for a trial off of the medication.  If the patient has a recurrence of his low back pain rating down the leg and I would consider him for repeat epidural steroid injection.      Follow up: As needed with me    Thank you for  allowing me to participate in the care of this patient. If you have any questions please not hesitate to contact me.             Please note that this dictation was created using voice recognition software. I have made every reasonable attempt to correct obvious errors but there may be errors of grammar and content that I may have overlooked prior to finalization of this note.      Harpal Bennett MD  Interventional Spine and Sports Physiatry  Physical Medicine and Rehabilitation  [facility]

## 2022-10-19 ENCOUNTER — PATIENT MESSAGE (OUTPATIENT)
Dept: CARDIOLOGY | Facility: MEDICAL CENTER | Age: 77
End: 2022-10-19

## 2022-10-19 ENCOUNTER — TELEMEDICINE (OUTPATIENT)
Dept: NEPHROLOGY | Facility: MEDICAL CENTER | Age: 77
End: 2022-10-19
Payer: MEDICARE

## 2022-10-19 VITALS
WEIGHT: 173 LBS | HEIGHT: 72 IN | TEMPERATURE: 96.3 F | HEART RATE: 83 BPM | BODY MASS INDEX: 23.43 KG/M2 | OXYGEN SATURATION: 96 % | SYSTOLIC BLOOD PRESSURE: 93 MMHG | DIASTOLIC BLOOD PRESSURE: 65 MMHG

## 2022-10-19 DIAGNOSIS — N18.2 CKD (CHRONIC KIDNEY DISEASE), STAGE II: ICD-10-CM

## 2022-10-19 DIAGNOSIS — E79.0 ELEVATED BLOOD URIC ACID LEVEL: ICD-10-CM

## 2022-10-19 DIAGNOSIS — R80.9 MICROALBUMINURIA DUE TO TYPE 2 DIABETES MELLITUS (HCC): ICD-10-CM

## 2022-10-19 DIAGNOSIS — E55.9 VITAMIN D DEFICIENCY: ICD-10-CM

## 2022-10-19 DIAGNOSIS — E11.29 MICROALBUMINURIA DUE TO TYPE 2 DIABETES MELLITUS (HCC): ICD-10-CM

## 2022-10-19 DIAGNOSIS — R33.9 URINARY RETENTION: ICD-10-CM

## 2022-10-19 DIAGNOSIS — D64.9 ANEMIA, UNSPECIFIED TYPE: ICD-10-CM

## 2022-10-19 DIAGNOSIS — I10 ESSENTIAL HYPERTENSION: ICD-10-CM

## 2022-10-19 PROCEDURE — 99214 OFFICE O/P EST MOD 30 MIN: CPT | Mod: 95 | Performed by: INTERNAL MEDICINE

## 2022-10-19 ASSESSMENT — ENCOUNTER SYMPTOMS
DIARRHEA: 0
NAUSEA: 0
HEMOPTYSIS: 0
FEVER: 0
SINUS PAIN: 0
PALPITATIONS: 0
COUGH: 0
WEIGHT LOSS: 0
ORTHOPNEA: 0
VOMITING: 0
SHORTNESS OF BREATH: 0
FLANK PAIN: 0
WHEEZING: 0
ABDOMINAL PAIN: 0
EYES NEGATIVE: 1
CHILLS: 0

## 2022-10-19 ASSESSMENT — FIBROSIS 4 INDEX: FIB4 SCORE: 1.03

## 2022-10-19 NOTE — PROGRESS NOTES
Telemedicine: Established Patient   This evaluation was conducted via Zoom using secure and encrypted videoconferencing technology. The patient was in their home in the [facility].    The patient's identity was confirmed and verbal consent was obtained for this virtual visit.    Subjective:   CC:   Chief Complaint   Patient presents with    Follow-Up    Chronic Kidney Disease       Av Bob is a 77 y.o. male presenting for evaluation and management of: CKD III, urinary retention, hydronephrosis, recurrent UTI, s/p TURP, urinary incontinence -Urology following  Doing well, no complaints.  HTN: low BP to monitor  Vit D and PTH well controlled -to monitor  Anemia: Hb level improved - stable  CKD III creat better to 1.1 -CKD II    Review of Systems   Constitutional:  Negative for chills, fever, malaise/fatigue and weight loss.   HENT:  Negative for congestion, hearing loss and sinus pain.    Eyes: Negative.    Respiratory:  Negative for cough, hemoptysis, shortness of breath and wheezing.    Cardiovascular:  Negative for chest pain, palpitations, orthopnea and leg swelling.   Gastrointestinal:  Negative for abdominal pain, diarrhea, nausea and vomiting.   Genitourinary:  Negative for dysuria, flank pain and hematuria.        Incontinence   Skin: Negative.    All other systems reviewed and are negative.      Allergies   Allergen Reactions    Diphenhydramine Anxiety     Pt is able to tolerate  Mg benadryl with less anxiety    Lorazepam Unspecified     Disorientation    Spironolactone Unspecified     Acute kidney injury    Ciprofloxacin Rash     Rash,stomach ache       Current medicines (including changes today)  Current Outpatient Medications   Medication Sig Dispense Refill    gabapentin (NEURONTIN) 100 MG Cap Take 1 Capsule by mouth 3 times a day as needed (pain). 90 Capsule 5    losartan (COZAAR) 50 MG Tab Take 1 Tablet by mouth 2 times a day. 180 Tablet 2    carvedilol (COREG) 6.25 MG Tab Take 1  Tablet by mouth 2 times a day with meals. 180 Tablet 2    allopurinol (ZYLOPRIM) 100 MG Tab Take 1 Tablet by mouth every evening. 100 Tablet 3    hydrocortisone (ANUSOL-HC) 25 MG Suppos Insert 1 Suppository into the rectum every 12 hours. 12 Suppository 1    lidocaine 2 % Gel Apply 1 Application topically as needed (rectal pain). 30 mL 1    finasteride (PROSCAR) 5 MG Tab Take 5 mg by mouth every evening.      Non Formulary Request Take 250 mg by mouth 2 times a day. Floastor (Probiotic)      Insulin Glargine, 2 Unit Dial, (TOUJEO MAX SOLOSTAR) 300 UNIT/ML Solution Pen-injector Inject 2 Units under the skin at bedtime.      traMADol (ULTRAM) 50 MG Tab Take 50 mg by mouth every 6 hours as needed for Severe Pain.      Multiple Vitamin (MULTIVITAMIN PO) Take 1 Tablet by mouth every morning.      Cholecalciferol (VITAMIN D) 2000 UNIT Tab Take 2,000 Units by mouth every morning.      Potassium Chloride CR (MICRO-K) 8 MEQ Cap CR capsule Take 1 Capsule by mouth every 48 hours. 45 Capsule 3    fluoxetine (PROZAC) 40 MG capsule TAKE 1 CAPSULE BY MOUTH EVERY  Capsule 0    insulin lispro (HUMALOG,ADMELOG) 100 UNIT/ML Solution Pen-injector injection PEN Inject 5 Units under the skin 3 times a day before meals. Humalog 5 units for sugars between 101-150, increase by 2 units if greater than 150.  Correction dosage is 2:50 greater than 150. 9 mL 3    magnesium oxide (MAG-OX) 400 MG Tab tablet Take 800 mg by mouth 2 times a day.      omeprazole (PRILOSEC) 20 MG delayed-release capsule Take 1 Capsule by mouth every day. 30 Capsule 2    acetaminophen (TYLENOL) 325 MG Tab Take 2 Tablets by mouth every 6 hours as needed for Mild Pain, Moderate Pain or Fever. 30 Tablet 0    fenofibrate (TRICOR) 145 MG Tab Take 1 Tablet by mouth every day. 90 Tablet 3    atorvastatin (LIPITOR) 80 MG tablet Take 1 Tablet by mouth every evening. 90 Tablet 3    Insulin Pen Needle (PEN NEEDLES) 32G X 4 MM Misc 1 Each 5 Times a Day. USE FOR INSULIN  SHOTS FIVE TIMES DAILY 500 Each 3    Blood Glucose Test Strips 1 Strip 2 times a day. One Touch Ultra 200 Strip 3    Insulin Pen Needle 32 G x 4 mm        No current facility-administered medications for this visit.       Patient Active Problem List    Diagnosis Date Noted    Dyschezia 09/22/2022    Hospital discharge follow-up 06/27/2022    Contracture of muscle of left upper arm- s/p serial casting, severe left elbow pain 06/27/2022    Acute deep vein thrombosis (DVT) of RLE and partial DVT of LLE 06/19/2022    Gout 06/07/2022    Vitamin D insufficiency 06/01/2022    Pressure injury of buttock, stage 1 02/17/2022    Right posterior hip pain and right leg cramping 09/09/2020    ASHLEY (obstructive sleep apnea) 08/27/2020    Nocturnal hypoxemia 07/06/2020    Chronic fatigue 06/09/2020    Essential hypertension, benign 05/06/2020    Polypharmacy 02/04/2020    Benign prostatic hyperplasia with urinary obstruction 01/29/2020    Altered mobility due to old stroke 01/22/2020    Neurogenic bladder 11/19/2019    PVD (peripheral vascular disease) ([facility]) 10/08/2019    GERD with esophagitis 10/19/2018    ([facility]) Left hemiparesis 12/27/2017    ([facility]) Diabetic nephropathy associated with type 2 diabetes mellitus 11/30/2017    Psychophysiological insomnia 11/21/2017    ([facility]) Moderate episode of recurrent major depressive disorder 11/07/2017    Wheelchair dependent 11/07/2017    Hypomagnesemia 08/12/2017    Paroxysmal atrial fibrillation ([facility]) 05/23/2017    Iron deficiency anemia 05/20/2017    H/O non-Hodgkin's lymphoma 05/08/2017    ([facility]) Hypertension associated with diabetes 03/22/2017    Type 2 diabetes mellitus with stage 3b chronic kidney disease, with long-term current use of insulin ([facility]) 12/30/2016    H/O Cerebrovascular accident (CVA) in adulthood 12/30/2016    Coronary artery disease involving native coronary artery 12/30/2016    Idiopathic chronic gout of multiple sites without tophus 01/28/2014    Presence of BMS and drug  coated stents in LAD coronary artery 12/13/2011    Presence of drug coated stents in left circumflex coronary artery 12/13/2011    Status post percutaneous transluminal coronary angioplasty 12/13/2011    ([facility]) Hyperlipidemia associated with type 2 diabetes mellitus 12/13/2011       Family History   Problem Relation Age of Onset    Heart Disease Father         CAD    Diabetes Father     Cancer Mother     Psychiatric Illness Mother         Depression    Depression Mother     Kidney stones Brother     Heart Disease Brother     Psychiatric Illness Brother         Depression    Depression Brother     Suicide Attempts Other     Psychiatric Illness Other         autism       He  has a past medical history of ([facility]) Chronic ulcer of right lower extremity with fat layer exposed (12/27/2018), Acute cystitis without hematuria (6/30/2019), Acute on chronic renal failure ([facility]) (1/21/2020), Acute prostatitis (1/13/2022), Acute respiratory failure with hypoxia ([facility]) (5/20/2017), CAD (coronary artery disease), Cancer ([facility]), Cataract, Cerebrovascular accident (CVA) ([facility]) (12/30/2016), Chickenpox, CKD (chronic kidney disease) stage 3, GFR 30-59 ml/min ([facility]), Controlled gout (2014), Coronary atherosclerosis of native coronary artery, Daytime sleepiness, Depression, Diabetes ([facility]), Diarrhea (7/2/2019), Difficulty swallowing, Dysphagia (3/15/2021), Enterococcal septicemia ([facility]) (8/12/2017), Exposure to SARS-associated coronavirus (10/13/2021), Roberto hematuria (1/29/2020), Frequent urination, GERD (gastroesophageal reflux disease), [place] measles, History of renal calculi (11/19/2019), Hordeolum externum of left lower eyelid (9/14/2021), Hydronephrosis (1/20/2020), Hypertension (06/30/2021), Hypokalemia (2012), Hypomagnesemia (08/12/2017), Influenza A (1/26/2020), Insomnia, Kidney stone, Left hand weakness (5/20/2019), Leg edema, right (1/22/2020), Lymphoma ([facility]) (2/19/2017), Mixed hyperlipidemia, Muscle strain of right gluteal region  (5/20/2019), Nephrolithiasis (2006), Normocytic hypochromic anemia (5/20/2017), NSTEMI (non-ST elevated myocardial infarction) ([facility]) (07/18/2017), Pain (06/30/2021), Peripheral vascular disease ([facility]), Polyneuropathy in diabetes(357.2) (9/11/2013), Pyelonephritis (5/24/2022), Renal cyst (1/29/2020), Renal mass (1/21/2020), Septic shock ([facility]) (5/20/2017), Skin ulcer of calf ([facility]) (2015), Stage 3b chronic kidney disease ([facility]) (8/25/2016), Stroke ([facility]) (2016), Toenail avulsion, initial encounter- left foot 3rd digit (7/12/2021), Urinary bladder disorder, Urinary incontinence, UTI (urinary tract infection) (5/28/2022), Weakness, and Wound of left leg (2012).    He has no past medical history of Suicide attempt ([facility]).  He  has a past surgical history that includes lithotripsy; angioplasty (1997); wound closure general (4/3/2012); tonsillectomy and adenoidectomy; cataract extraction with iol; solo by laparoscopy (1998); cystoscopy stent placement (Left, 2/12/2018); ureteroscopy (Left, 2/12/2018); lasertripsy (Left, 2/12/2018); [facility] cardiac cath (1997); [facility] cardiac cath (2009); tonsillectomy; lumbar transforaminal epidural steroid injection (Right, 11/2/2020); lumbar transforaminal epidural steroid injection (Right, 3/29/2021); pr upper gi endoscopy,diagnosis (N/A, 7/21/2021); pr upper gi endoscopy,biopsy (N/A, 7/21/2021); and pr inj lumbar/sacral,w/ imaging (7/27/2021).       Objective:   BP (!) 93/65 (BP Location: Right arm, Patient Position: Sitting, BP Cuff Size: Adult)   Pulse 83   Temp (!) 35.7 °C (96.3 °F) (Temporal)   Ht 1.829 m (6')   Wt 78.5 kg (173 lb)   SpO2 96%   BMI 23.46 kg/m²     Physical Exam  Vitals reviewed.   Constitutional:       Appearance: Normal appearance.   HENT:      Head: Normocephalic and atraumatic.   Pulmonary:      Effort: Pulmonary effort is normal. No respiratory distress.   Musculoskeletal:      Cervical back: Normal range of motion and neck supple.      Right lower leg: No edema.       Left lower leg: No edema.   Neurological:      Mental Status: He is alert and oriented to person, place, and time.   Psychiatric:         Mood and Affect: Mood normal.         Behavior: Behavior normal.         Thought Content: Thought content normal.         Judgment: Judgment normal.       Assessment and Plan:   The following treatment plan was discussed:     1. CKD (chronic kidney disease), stage II  - Basic Metabolic Panel; Future  - PTH INTACT (PTH ONLY); Future    2. Essential hypertension    3. Microalbuminuria due to type 2 diabetes mellitus (HCC)    4. Vitamin D deficiency  - VITAMIN D 25-HYDROXY    5. Anemia, unspecified type  - CBC WITHOUT DIFFERENTIAL; Future    6. Urinary retention    7. Elevated blood uric acid level  - URIC ACID, SERUM    Recs:    Continue current treatment  Keep well hydrated  Monitor BP  Low salt diet  F/eu with Urology  Follow-up: Return in about 6 months (around 4/19/2023).

## 2022-10-20 ENCOUNTER — TELEPHONE (OUTPATIENT)
Dept: CARDIOLOGY | Facility: MEDICAL CENTER | Age: 77
End: 2022-10-20
Payer: MEDICARE

## 2022-10-20 VITALS — SYSTOLIC BLOOD PRESSURE: 93 MMHG | HEART RATE: 83 BPM | DIASTOLIC BLOOD PRESSURE: 65 MMHG

## 2022-10-20 RX ORDER — AMLODIPINE BESYLATE 2.5 MG/1
2.5 TABLET ORAL DAILY
Qty: 90 TABLET | Refills: 3 | Status: SHIPPED | OUTPATIENT
Start: 2022-10-20 | End: 2022-11-03

## 2022-10-21 NOTE — PROGRESS NOTES
Bps reviewed and are in the 120s-160s, primarily 130s-140s. One in the 110s.     I have prescribed norvasc 2.5mg PO QAM and let him know, we'll see how this helps and check in again in a week.     Thanks!

## 2022-10-23 ENCOUNTER — PATIENT MESSAGE (OUTPATIENT)
Dept: CARDIOLOGY | Facility: MEDICAL CENTER | Age: 77
End: 2022-10-23
Payer: MEDICARE

## 2022-10-23 DIAGNOSIS — I25.10 CORONARY ARTERY DISEASE INVOLVING NATIVE CORONARY ARTERY OF NATIVE HEART WITHOUT ANGINA PECTORIS: ICD-10-CM

## 2022-10-24 ENCOUNTER — OFFICE VISIT (OUTPATIENT)
Dept: PHYSICAL MEDICINE AND REHAB | Facility: MEDICAL CENTER | Age: 77
End: 2022-10-24
Payer: MEDICARE

## 2022-10-24 VITALS
TEMPERATURE: 97.8 F | HEART RATE: 61 BPM | SYSTOLIC BLOOD PRESSURE: 130 MMHG | RESPIRATION RATE: 16 BRPM | OXYGEN SATURATION: 98 % | BODY MASS INDEX: 24.77 KG/M2 | HEIGHT: 70 IN | WEIGHT: 173 LBS | DIASTOLIC BLOOD PRESSURE: 80 MMHG

## 2022-10-24 DIAGNOSIS — Z86.73 HISTORY OF STROKE: ICD-10-CM

## 2022-10-24 DIAGNOSIS — I69.954 HEMIPLEGIA AND HEMIPARESIS FOLLOWING UNSPECIFIED CEREBROVASCULAR DISEASE AFFECTING LEFT NON-DOMINANT SIDE (HCC): Primary | ICD-10-CM

## 2022-10-24 DIAGNOSIS — M25.522 LEFT ELBOW PAIN: ICD-10-CM

## 2022-10-24 PROCEDURE — 76942 ECHO GUIDE FOR BIOPSY: CPT | Performed by: PHYSICAL MEDICINE & REHABILITATION

## 2022-10-24 PROCEDURE — 64642 CHEMODENERV 1 EXTREMITY 1-4: CPT | Performed by: PHYSICAL MEDICINE & REHABILITATION

## 2022-10-24 ASSESSMENT — FIBROSIS 4 INDEX: FIB4 SCORE: 1.03

## 2022-10-24 NOTE — PROCEDURES
[facility]  Physical Medicine & Rehabilitation Clinic  [address] [place] [address]  Ph: [phone]    PROCEDURE NOTE    Procedure: Botulinum Injection  Primary Diagnosis & Secondary Diagnoses:     Visit Diagnoses     ICD-10-CM   1. Hemiplegia and hemiparesis following unspecified cerebrovascular disease affecting left non-dominant side (HCC)  I69.954   2. History of stroke  Z86.73   3. Left elbow pain  M25.522     Vitals:    10/24/22 1623   BP: 130/80   Pulse: 61   Resp: 16   Temp: 36.6 °C (97.8 °F)   SpO2: 98%         INFORMED CONSENT   The risks and benefits of the procedure were explained to the patient, and all questions were answered. Benefits of the injection include spasticity reduction by decrease in muscle activation following toxin injection. Adverse effects from toxin injection include but are not limited to: excessive weakness, infection, breathing and/or swallowing difficulty.  Common adverse effects from the injection itself are pain and bruising. The patient demonstrated good understanding of risks and benefits and consents to botulinum toxin injection.     Received botulinum toxin product in last 3 mos: No  Have recently received abx (aminoglycosides): No  Take anti-spasticity medications: No  Take allergy/cold medicine:  No  Take a sleep medicine: No  Lactating/pregnant: No  Known NMJ disease: No  Human albumin allergy: No    Pre-procedure time out was performed.     PROCEDURE:  Usual sterile procedure was observed with sterile prep with chlorhexidine. Ultrasound was used for needle guidance for the injections in the following muscles. A negative aspiration of blood was obtained, and the following was injected into each muscle.    Botox: left upper extremity  Location Botox Amount in Units   Biceps    400 units   Total Units  400 units      Patient did not get as much benefit from lower extremity Botox at last visit so today we decided to increase the number of muscles targeted in the  left upper extremity for maximal benefit.    Injection sites were cleaned with alcohol and band aid was applied afterwards.  The patient tolerated the procedure well.  There were no complications.  The images were uploaded for permanent storage    MEDICATION USED:  200 units of botulinum toxin type A, mixed with 2mL of sterile, preservative-free normal saline in four 1cc syringes, for a total of 100 units in 1 mL. Repeated for other vial.    Total units injected: 400 units  Total units discarded: 0 units    See MAR for Botox details.     Counseling: Pt was instructed to seek immediate medical attention if fever, difficulty breathing, difficulty swallowing, or other concerning sxs arise. RTC in 13 weeks repeat injections.    Consider wrist flexors next visit if able.     BUY & BILL    BOTOX 200 unit vials (2 vials)  NDC 3043-8350-51  Lot [id number]  Exp 02/2025    Dr. Lorie Huff DO, MS  Department of Physical Medicine & Rehabilitation  [facility]  [facility]

## 2022-10-25 RX ORDER — CLOPIDOGREL BISULFATE 75 MG/1
75 TABLET ORAL
Qty: 90 TABLET | Refills: 3 | Status: SHIPPED | OUTPATIENT
Start: 2022-10-25 | End: 2023-05-03 | Stop reason: SDUPTHER

## 2022-10-26 ENCOUNTER — PATIENT OUTREACH (OUTPATIENT)
Dept: HEALTH INFORMATION MANAGEMENT | Facility: OTHER | Age: 77
End: 2022-10-26
Payer: MEDICARE

## 2022-10-26 DIAGNOSIS — E11.22 TYPE 2 DIABETES MELLITUS WITH STAGE 3B CHRONIC KIDNEY DISEASE, WITH LONG-TERM CURRENT USE OF INSULIN (HCC): ICD-10-CM

## 2022-10-26 DIAGNOSIS — Z79.4 TYPE 2 DIABETES MELLITUS WITH STAGE 3B CHRONIC KIDNEY DISEASE, WITH LONG-TERM CURRENT USE OF INSULIN (HCC): ICD-10-CM

## 2022-10-26 DIAGNOSIS — N18.32 TYPE 2 DIABETES MELLITUS WITH STAGE 3B CHRONIC KIDNEY DISEASE, WITH LONG-TERM CURRENT USE OF INSULIN (HCC): ICD-10-CM

## 2022-10-26 PROCEDURE — 99490 CHRNC CARE MGMT STAFF 1ST 20: CPT | Performed by: INTERNAL MEDICINE

## 2022-10-26 NOTE — PROGRESS NOTES
10/26/22 1:55 pm Nurse CM outreach call to patient for monthly CCM assessment. VM left with CM contact number.     10/26/22 2:50pm:  Spouse returned CM call. States patient has been doing okay. Reports pain from having BM's has improved. States they do have an appointment with GI provider 11/8.   Reports blood pressure readings are improving. Spouse states they are sending readings to cardiology for follow-up. Started taking amlodipine recently and will continue to send results to cardiology.  Spouse reports blood sugar readings have been good. Reports last night reading was 91. Reports they held the 2 units of Toujeo. States blood sugar reading this am was 73. Reports she is holding Toujeo in the evening if readings below 100. Spouse will give a bedtime snack if blood sugar reading below 100 at hs.     Spouse states patient has been feeling better. States he has more energy. Spouse thinks since pt stopped xarelto he is doing better.     Plan:  Nurse will follow-up next month. Update to PCP with recent blood sugar reading.

## 2022-11-03 ENCOUNTER — OFFICE VISIT (OUTPATIENT)
Dept: CARDIOLOGY | Facility: MEDICAL CENTER | Age: 77
End: 2022-11-03
Payer: MEDICARE

## 2022-11-03 VITALS
OXYGEN SATURATION: 99 % | DIASTOLIC BLOOD PRESSURE: 68 MMHG | WEIGHT: 173 LBS | SYSTOLIC BLOOD PRESSURE: 114 MMHG | HEIGHT: 70 IN | BODY MASS INDEX: 24.77 KG/M2 | RESPIRATION RATE: 16 BRPM | HEART RATE: 68 BPM

## 2022-11-03 DIAGNOSIS — E78.5 HYPERLIPIDEMIA ASSOCIATED WITH TYPE 2 DIABETES MELLITUS (HCC): ICD-10-CM

## 2022-11-03 DIAGNOSIS — E78.5 DYSLIPIDEMIA: ICD-10-CM

## 2022-11-03 DIAGNOSIS — E11.22 TYPE 2 DIABETES MELLITUS WITH STAGE 3B CHRONIC KIDNEY DISEASE, WITH LONG-TERM CURRENT USE OF INSULIN (HCC): ICD-10-CM

## 2022-11-03 DIAGNOSIS — I48.0 PAROXYSMAL ATRIAL FIBRILLATION (HCC): ICD-10-CM

## 2022-11-03 DIAGNOSIS — I73.9 PVD (PERIPHERAL VASCULAR DISEASE) (HCC): ICD-10-CM

## 2022-11-03 DIAGNOSIS — I25.10 CORONARY ARTERY DISEASE INVOLVING NATIVE CORONARY ARTERY OF NATIVE HEART WITHOUT ANGINA PECTORIS: ICD-10-CM

## 2022-11-03 DIAGNOSIS — I10 ESSENTIAL HYPERTENSION, BENIGN: ICD-10-CM

## 2022-11-03 DIAGNOSIS — Z99.3 WHEELCHAIR DEPENDENCE: ICD-10-CM

## 2022-11-03 DIAGNOSIS — E11.69 HYPERLIPIDEMIA ASSOCIATED WITH TYPE 2 DIABETES MELLITUS (HCC): ICD-10-CM

## 2022-11-03 DIAGNOSIS — R00.1 BRADYCARDIA: ICD-10-CM

## 2022-11-03 DIAGNOSIS — N18.32 TYPE 2 DIABETES MELLITUS WITH STAGE 3B CHRONIC KIDNEY DISEASE, WITH LONG-TERM CURRENT USE OF INSULIN (HCC): ICD-10-CM

## 2022-11-03 DIAGNOSIS — Z79.4 TYPE 2 DIABETES MELLITUS WITH STAGE 3B CHRONIC KIDNEY DISEASE, WITH LONG-TERM CURRENT USE OF INSULIN (HCC): ICD-10-CM

## 2022-11-03 PROCEDURE — 99214 OFFICE O/P EST MOD 30 MIN: CPT | Performed by: INTERNAL MEDICINE

## 2022-11-03 RX ORDER — AMLODIPINE BESYLATE 5 MG/1
5 TABLET ORAL DAILY
Qty: 100 TABLET | Refills: 3 | Status: SHIPPED | OUTPATIENT
Start: 2022-11-03 | End: 2022-12-13 | Stop reason: SDUPTHER

## 2022-11-03 RX ORDER — ATORVASTATIN CALCIUM 80 MG/1
80 TABLET, FILM COATED ORAL EVERY EVENING
Qty: 100 TABLET | Refills: 3 | Status: SHIPPED | OUTPATIENT
Start: 2022-11-03 | End: 2023-05-03 | Stop reason: SDUPTHER

## 2022-11-03 RX ORDER — HYDRALAZINE HYDROCHLORIDE 10 MG/1
10 TABLET, FILM COATED ORAL 3 TIMES DAILY PRN
Qty: 100 TABLET | Refills: 3 | Status: SHIPPED | OUTPATIENT
Start: 2022-11-03 | End: 2024-02-27

## 2022-11-03 RX ORDER — CARVEDILOL 3.12 MG/1
3.12 TABLET ORAL 2 TIMES DAILY WITH MEALS
Qty: 200 TABLET | Refills: 3 | Status: SHIPPED | OUTPATIENT
Start: 2022-11-03 | End: 2023-11-21

## 2022-11-03 RX ORDER — FENOFIBRATE 145 MG/1
145 TABLET, COATED ORAL
Qty: 100 TABLET | Refills: 3 | Status: SHIPPED | OUTPATIENT
Start: 2022-11-03 | End: 2023-05-19

## 2022-11-03 RX ORDER — INSULIN GLARGINE 300 U/ML
INJECTION, SOLUTION SUBCUTANEOUS
COMMUNITY
Start: 2022-10-25 | End: 2022-11-03

## 2022-11-03 ASSESSMENT — FIBROSIS 4 INDEX: FIB4 SCORE: 1.03

## 2022-11-03 NOTE — PROGRESS NOTES
Cardiology Follow-up Consultation Note    Date of note: 11/3/2022  Primary Care Provider: Mitchell Pantoja M.D.  Referring Provider: Leonardo.     Patient Name: Av Bob   YOB: 1945  MRN:              [id number]    Chief Complaint: CAD    History of Present Illness: Av Bob is a 77 y.o. male whose current medical problems include CKD stage III, hypertension, CAD  (GIOVANNA to RCA in '97, GIOVANNA X2 to LAD and GIOVANNA X to OM in '09), atrial fibrillation, diabetes, dyslipidemia, gout, CVA 12/2016,  non-hodgkins lymphoma c/b brain lesions with resultant left hemiparesis s/p chemotherapy and depression who is here for follow-up.     At our visit, 4/3/2018:  In terms of his CVA, this was in 2016, and while he was on aspirin and plavix at [facility].  This was in the setting of active lymphoma.    He was also admitted to [facility] again in 5/2017 for sepsis and was noted to have atrial fibrillation at this time. He has no noted recurrence and no episodes before that.  He has never been on anticoagulation for Cva prevention.     In terms of his NHL, he sees Dr. Noel, and his last PET scan showed he was cancer free.     Right leg wounds, currently healing. Evaluated by Dr. Terry for bilateral tibial artery stenosis, and decided against surgery at this time.     At our visit, 10/9/2018:  Started xarelto and had episodes of melena.  Switched back to plavix.  FOBT was negative.     At our visit, 4/16/2019:  Admitted 11/21/2018 for weakness, negative cerebrovascular work-up.     At our visit, 10/8/2019:  In terms of cancer, no e/o recurrence.     Still works with  an hour three times a week, no symptoms.    Was admitted for UTI this summer.     Did have peripheral angiogram, noted stenosis but no intervention performed. At [facility].     At our visit, 5/6/2020:  Cholesterol was poorly controlled, I increased his lipitor.     Hospitalized 1/21/2020 for  hematuria and UTI requiring bladder irrigation.     Fatigued possibly improved on lower dose of metoprolol.     At our visit, 11/17/2020:  Still with leg cramping on occasion.     At our visit, 5/19/2021:  Diabetes worsening.     At our visit, 11/9/2021:  Hypertension well controlled usually in the 120s.     Interim Events:  Had acute DVT, had three months of xarelto (stopped 9/20/2022) per Dr. Noel at [facility] due to hematuria.     Heart rate in the mornings in the 40s. Improved off metoprolol and now back to low. No pre-syncope.     BP in the 120s-160s.     In terms of Cad, no angina.     In terms of PSVT, no palpitations.     Elevated WBC count last month, no symptoms. No f/u yet.       ROS  Constitution: Negative for chills, fever and night sweats.   HENT: Negative for nosebleeds.    Eyes: Negative for vision loss in left eye and vision loss in right eye.   Respiratory: Negative for hemoptysis.    Gastrointestinal: Negative for hematemesis, hematochezia and melena.   Genitourinary: Negative for hematuria.   Neurological: Negative for new focal weakness, numbness and paresthesias.       Past Medical History:   Diagnosis Date    ([facility]) Chronic ulcer of right lower extremity with fat layer exposed 12/27/2018    Acute cystitis without hematuria 6/30/2019    Acute on chronic renal failure ([facility]) 1/21/2020    Acute prostatitis 1/13/2022    New problem of prostatitis identified by dispatch health when patient developed hematuria with rectal pain.  He followed up with his urology PA and was placed back on Bactrim.  He was recently on Bactrim and has a history of recurrent urinary tract infections related to neurogenic bladder from prior stroke.  Rectal pain is dissipated however he continues to have hematuria.  He had associated CATA o    Acute respiratory failure with hypoxia ([facility]) 5/20/2017    CAD (coronary artery disease)     GIOVANNA to RCA in '97, GIOVANNA X2 to LAD and GIOVANNA X2 to OM in '09    Cancer ([facility])     2017; chemo lympoma  non hodgkins lymphoma    Cataract     dez iol    Cerebrovascular accident (CVA) ([facility]) 12/30/2016    Left arm weakness  etiology of stroke not established, lymphoma discovered on MRI evaluation of stroke, L hemiparesis much worse after acute infectious illness in mid 2017, but no specific diagnosed recurrent neurological etiology, all at [facility]    Chickenpox     CKD (chronic kidney disease) stage 3, GFR 30-59 ml/min ([facility])     Controlled gout 2014    Coronary atherosclerosis of native coronary artery     S/P PTCA (percutaneous transluminal coronary angioplasty), RCA, 5/1997, patent on cath 7/10/2009 at the time of interventions on his left anterior descending and circumflex coronary arteries    Daytime sleepiness     Depression     Diabetes ([facility])     Diarrhea 7/2/2019    Difficulty swallowing     Dysphagia 3/15/2021    He reports progressive worsening of his swallow function.  He notices at least once per week he is choking and coughing, can be with pills or can be with food.  The episodes are quite frightening and he takes a bit of time for him to recover.  He has a prior history of stroke and recalls working with speech-language pathology at that time but does not remember if he did any formal esophagrams or s    Enterococcal septicemia ([facility]) 8/12/2017    Exposure to SARS-associated coronavirus 10/13/2021    He reports viral illness last week with runny nose, congestion, productive cough, and wheezing.  He went to a play of his grandson and may have been exposed to Covid.  He was feeling very bad last Friday and over the weekend and now is at least 50% better.    Roberto hematuria 1/29/2020    Frequent urination     GERD (gastroesophageal reflux disease)     Slov[place] measles     History of renal calculi 11/19/2019    Hordeolum externum of left lower eyelid 9/14/2021    He reports to clinic with 3 weeks of erythema and pain of the left lower eyelid. No clear inciting cause. Reports no  globe pain. Lower > upper eyelid swelling and erythema. After 1.5 weeks had drainage of purulence from the lower medial eyelid. No photosensitivity. Using warm compresses. No changes in visual acuity. 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 Barry    Normocytic hypochromic anemia 5/20/2017    NSTEMI (non-ST elevated myocardial infarction) ([facility]) 07/18/2017    complicating UTI with sepsis    Pain 06/30/2021    right leg foot    Peripheral vascular disease ([facility])     Polyneuropathy in diabetes(357.2) 9/11/2013    Pyelonephritis 5/24/2022    Av had abrupt onset of illness starting on Sunday.  He felt unwell and then had projectile vomiting x3 episodes after dinner that evening.  He had associated nausea but no overt abdominal pain.  He has continued to have anorexia and is having difficulty with his p.o. intake.  He did get in some fruit in a month and yesterday and about 50 to 60 ounces of water.  He has chronic urinary retention    Renal cyst 1/29/2020    Renal mass 1/21/2020    Septic shock ([facility]) 5/20/2017    Skin ulcer of calf ([facility]) 2015    Right, Dr. Terry and wound care    Stage 3b chronic kidney disease ([facility]) 8/25/2016     Latest Reference Range & Units 04/29/22 11:14 Bun 8 - 22 mg/dL 33 (H) Creatinine 0.50 - 1.40 mg/dL 1.55 (H) GFR (CKD-EPI) >60 mL/min/1.73 m 2 46 ! [1]  He has a history of chronic kidney disease related to  nephrolithiasis, recurrent UTIs, and BPH as well as history of hypertension and diabetes.  He is following with nephrology, Dr. Jarvis and urology, Dr. Barry.  Spironolactone was discontinued due     Stroke ([facility]) 2016    left sided weakness    Toenail avulsion, initial encounter- left foot 3rd digit 7/12/2021    They report that his toenail spontaneously came off last week.  He does not recall specific injury or any pain.  His significant other saw the nail on the floor with dried blood on his foot.  She cleaned the wound and covered it with gauze.  On follow-up in clinic today the gauze has stuck to the wound bed but with saline was fairly easy to remove the dried gauze.  There was scant amount of bright    Urinary bladder disorder     Urinary incontinence     pt wears pads    UTI (urinary tract infection) 5/28/2022    Weakness     Wound of left leg 2012    Requiring surgery and debridment, Dr. Moore         Past Surgical History:   Procedure Laterality Date    HI INJ LUMBAR/SACRAL,W/ IMAGING  7/27/2021    Procedure: Lumbar L5-S1 interlaminar epidural steroid injection;  Surgeon: Harpal Bennett M.D.;  Location: [facility];  Service: Pain Management    HI UPPER GI ENDOSCOPY,DIAGNOSIS N/A 7/21/2021    Procedure: GASTROSCOPY - AND DILATION;  Surgeon: Neville Huerta M.D.;  Location: SURGERY SAME DAY [facility];  Service: Gastroenterology    HI UPPER GI ENDOSCOPY,BIOPSY N/A 7/21/2021    Procedure: GASTROSCOPY, WITH BIOPSY;  Surgeon: Neville Huerta M.D.;  Location: SURGERY SAME DAY [facility];  Service: Gastroenterology    LUMBAR TRANSFORAMINAL EPIDURAL STEROID INJECTION Right 3/29/2021    Procedure: RIGHT L3-4 and L4-5 transforaminal epidural steroid injection with fluoroscopic guidance;  Surgeon: Harpal Bennett M.D.;  Location: [facility];  Service: Pain Management    LUMBAR TRANSFORAMINAL EPIDURAL STEROID INJECTION Right 11/2/2020    Procedure: INJECTION, STEROID, SPINE, LUMBAR,  EPIDURAL, TRANSFORAMINAL APPROACH;  Surgeon: Harpal Bennett M.D.;  Location: [facility];  Service: Pain Management    CYSTOSCOPY STENT PLACEMENT Left 2/12/2018    Procedure: CYSTOSCOPY  ;  Surgeon: Rey Barry M.D.;  Location: SURGERY [facility];  Service: Urology    URETEROSCOPY Left 2/12/2018    Procedure: URETEROSCOPY- FLEXIBLE  ;  Surgeon: Rey Barry M.D.;  Location: SURGERY [facility];  Service: Urology    LASERTRIPSY Left 2/12/2018    Procedure: LASERTRIPSY - LITHO  ;  Surgeon: Rey Barry M.D.;  Location: SURGERY [facility];  Service: Urology    WOUND CLOSURE GENERAL  4/3/2012    Performed by GERHARD GILBERT at SURGERY SAME DAY [facility] CARDIAC CATH  2009    Stents to LAD, Om    DAGOBERTO BY LAPAROSCOPY  1998    ANGIOPLASTY  1997    RCA followed by other stents as noted above.     [facility] CARDIAC CATH  1997    stent RCA    CATARACT EXTRACTION WITH IOL      bilateral    LITHOTRIPSY      TONSILLECTOMY      TONSILLECTOMY AND ADENOIDECTOMY           Current Outpatient Medications   Medication Sig Dispense Refill    clopidogrel (PLAVIX) 75 MG Tab Take 1 Tablet by mouth every day. 90 Tablet 3    amLODIPine (NORVASC) 2.5 MG Tab Take 1 Tablet by mouth every day. 90 Tablet 3    gabapentin (NEURONTIN) 100 MG Cap Take 1 Capsule by mouth 3 times a day as needed (pain). 90 Capsule 5    losartan (COZAAR) 50 MG Tab Take 1 Tablet by mouth 2 times a day. 180 Tablet 2    carvedilol (COREG) 6.25 MG Tab Take 1 Tablet by mouth 2 times a day with meals. 180 Tablet 2    allopurinol (ZYLOPRIM) 100 MG Tab Take 1 Tablet by mouth every evening. 100 Tablet 3    hydrocortisone (ANUSOL-HC) 25 MG Suppos Insert 1 Suppository into the rectum every 12 hours. 12 Suppository 1    lidocaine 2 % Gel Apply 1 Application topically as needed (rectal pain). 30 mL 1    Insulin Pen Needle (PEN NEEDLES) 32G X 4 MM Misc 1 Each 5 Times a Day. USE FOR INSULIN SHOTS FIVE TIMES DAILY 500 Each 3    Blood Glucose  Test Strips 1 Strip 2 times a day. One Touch Ultra 200 Strip 3    finasteride (PROSCAR) 5 MG Tab Take 5 mg by mouth every evening.      Non Formulary Request Take 250 mg by mouth 2 times a day. Floastor (Probiotic)      Insulin Glargine, 2 Unit Dial, (TOUJEO MAX SOLOSTAR) 300 UNIT/ML Solution Pen-injector Inject 2 Units under the skin at bedtime.      traMADol (ULTRAM) 50 MG Tab Take 50 mg by mouth every 6 hours as needed for Severe Pain.      Multiple Vitamin (MULTIVITAMIN PO) Take 1 Tablet by mouth every morning.      Cholecalciferol (VITAMIN D) 2000 UNIT Tab Take 2,000 Units by mouth every morning.      Potassium Chloride CR (MICRO-K) 8 MEQ Cap CR capsule Take 1 Capsule by mouth every 48 hours. 45 Capsule 3    fluoxetine (PROZAC) 40 MG capsule TAKE 1 CAPSULE BY MOUTH EVERY  Capsule 0    insulin lispro (HUMALOG,ADMELOG) 100 UNIT/ML Solution Pen-injector injection PEN Inject 5 Units under the skin 3 times a day before meals. Humalog 5 units for sugars between 101-150, increase by 2 units if greater than 150.  Correction dosage is 2:50 greater than 150. 9 mL 3    magnesium oxide (MAG-OX) 400 MG Tab tablet Take 800 mg by mouth 2 times a day.      omeprazole (PRILOSEC) 20 MG delayed-release capsule Take 1 Capsule by mouth every day. 30 Capsule 2    acetaminophen (TYLENOL) 325 MG Tab Take 2 Tablets by mouth every 6 hours as needed for Mild Pain, Moderate Pain or Fever. 30 Tablet 0    Insulin Pen Needle 32 G x 4 mm       fenofibrate (TRICOR) 145 MG Tab Take 1 Tablet by mouth every day. 90 Tablet 3    atorvastatin (LIPITOR) 80 MG tablet Take 1 Tablet by mouth every evening. 90 Tablet 3     No current facility-administered medications for this visit.         Allergies   Allergen Reactions    Diphenhydramine Anxiety     Pt is able to tolerate  Mg benadryl with less anxiety    Lorazepam Unspecified     Disorientation    Spironolactone Unspecified     Acute kidney injury    Ciprofloxacin Rash     Rash,stomach ache          Family History   Problem Relation Age of Onset    Heart Disease Father         CAD    Diabetes Father     Cancer Mother     Psychiatric Illness Mother         Depression    Depression Mother     Kidney stones Brother     Heart Disease Brother     Psychiatric Illness Brother         Depression    Depression Brother     Suicide Attempts Other     Psychiatric Illness Other         autism         Social History     Socioeconomic History    Marital status:      Spouse name: Not on file    Number of children: Not on file    Years of education: Not on file    Highest education level: Not on file   Occupational History    Not on file   Tobacco Use    Smoking status: Never    Smokeless tobacco: Never   Vaping Use    Vaping Use: Never used   Substance and Sexual Activity    Alcohol use: Never    Drug use: Never    Sexual activity: Not Currently     Partners: Female     Comment: , one daughter, 2 grands   Other Topics Concern     Service Yes    Blood Transfusions No    Caffeine Concern No    Occupational Exposure No    Hobby Hazards No    Sleep Concern Yes    Stress Concern No    Weight Concern No    Special Diet No    Back Care No    Exercise Yes    Bike Helmet No    Seat Belt Yes    Self-Exams Yes   Social History Narrative    Av is from [place], [place] and raised in [place]. He has been in [place] since 2004. 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  He has been  to Usha since 2003, they met through a mutual friend. He has a daughter (Kalina) and a step son (Jimi).     Social Determinants of Health     Financial Resource Strain: Low Risk     Difficulty of Paying Living Expenses: Not hard at all   Food Insecurity: No Food Insecurity    Worried About  "Running Out of Food in the Last Year: Never true    Ran Out of Food in the Last Year: Never true   Transportation Needs: Not on file   Physical Activity: Inactive    Days of Exercise per Week: 0 days    Minutes of Exercise per Session: 0 min   Stress: No Stress Concern Present    Feeling of Stress : Only a little   Social Connections: Not on file   Intimate Partner Violence: Not on file   Housing Stability: Unknown    Unable to Pay for Housing in the Last Year: No    Number of Places Lived in the Last Year: Not on file    Unstable Housing in the Last Year: No     Physical Exam:  Ambulatory Vitals  /68 (BP Location: Left arm, Patient Position: Sitting, BP Cuff Size: Adult)   Pulse 68   Resp 16   Ht 1.778 m (5' 10\")   Wt 78.5 kg (173 lb)   SpO2 99%    Oxygen Therapy:  Pulse Oximetry: 99 %  BP Readings from Last 4 Encounters:   11/03/22 114/68   10/24/22 130/80   10/19/22 (!) 93/65   10/19/22 (!) 93/65       Weight/BMI: Body mass index is 24.82 kg/m².  Wt Readings from Last 4 Encounters:   11/03/22 78.5 kg (173 lb)   10/24/22 78.5 kg (173 lb)   10/19/22 78.5 kg (173 lb)   10/17/22 78.5 kg (173 lb)     General: No apparent distress  Eyes: nl conjunctiva  ENT: OP covered by mask,   Neck: JVP <8 cm H2O  Lungs: normal respiratory effort, CTAB  Heart: RRR, no murmurs, no rubs or gallops, trace edema bilateral lower extremities. No LV/RV heave on cardiac palpatation. 2+ bilateral radial pulses.   Abdomen: soft, non tender, non distended, no masses, normal bowel sounds.  No HSM.  Extremities/MSK: no clubbing, no cyanosis  Neurological: ZHANG  Psychiatric: Appropriate affect, A/O x 3, intact judgement and insight  Skin: Warm extremities    Exam repeated in full and unchanged except for as noted above.      Lab Data Review:  Lab Results   Component Value Date/Time    CHOLSTRLTOT 129 04/29/2022 11:18 AM    LDL 80 04/29/2022 11:18 AM    HDL 29 (A) 04/29/2022 11:18 AM    TRIGLYCERIDE 99 04/29/2022 11:18 AM       Lab " Results   Component Value Date/Time    SODIUM 137 10/12/2022 02:04 PM    POTASSIUM 4.3 10/12/2022 02:04 PM    CHLORIDE 104 10/12/2022 02:04 PM    CO2 26 10/12/2022 02:04 PM    GLUCOSE 87 10/12/2022 02:04 PM    BUN 28 (H) 10/12/2022 02:04 PM    CREATININE 1.10 10/12/2022 02:04 PM    CREATININE 1.4 05/28/2008 05:42 PM    BUNCREATRAT 10 03/27/2017 02:11 PM     Lab Results   Component Value Date/Time    ALKPHOSPHAT 107 (H) 08/25/2022 01:47 PM    ASTSGOT 24 08/25/2022 01:47 PM    ALTSGPT 12 08/25/2022 01:47 PM    TBILIRUBIN 0.3 08/25/2022 01:47 PM      Lab Results   Component Value Date/Time    WBC 13.9 (H) 10/12/2022 02:04 PM     No components found for: HBGA1C  No components found for: TROPONIN  No results found for: BNP      Cardiac Imaging and Procedures Review:    EKG dated 1/26/2020: personally reviewed and showed NSR, artifact, IVCD, late precordial R/S transitoin and prolonged QTc interval.     Echo dated 6/2022:  CONCLUSIONS  Moderate concentric left ventricular hypertrophy.  Normal left ventricular systolic function.  Indeterminate diastolic function.      Echo dated 11/21/2018:  CONCLUSIONS  Prior echocardiogram 10/5/2017 - no noted changes.  The effusion is   new.  Left ventricular ejection fraction is visually estimated to be 60%.  Normal inferior vena cava size and inspiratory collapse.  Trivial to small pericardial effusion noted without evidence of   hemodynamic compromise.  No significant valve disease or flow abnormalities.     Holter, 5/30/2018:    CONCLUSIONS:  * paroxysmal supraventricular tachycardia.  15 episodes over 13 days.  Longest episode 17 seconds  * No atrial fibrillation  * No significant ectopy  * No arrhythmias or ectopy during symptoms      Nuclear Perfusion Imaging (1/2018):   Myocardial Perfusion   Report   NUCLEAR IMAGING INTERPRETATION   The LV is mildly dilated with global hypokinesis.  Calculated LV EF is 49%.     There is a small region of decreased perfusion in the  inferior-lateral wall    with a mild amount of inducible ischemia.   ECG INTERPRETATION   Negative stress ECG for ischemia.      Stress test 6/2017 ([facility])    Findings:   Small in size moderate in intensity fixed apical defect. Computer analysis also suggests mild in intensity small in size diminished counts along inferior wall of left ventricle which appears to improve at time of rest. Summed stress score is   7. Summed rest score is 1. Gross hypomotility of the left ventricle is noted with disordered contraction evident. Global hypokinesis is noted.      [facility] (2009): at [facility], last stent.     Radiology test Review:  CXR:1/26/2020:  IMPRESSION:     Stable chest without acute/new abnormality.    Vascular US 2/2018:  Vascular Laboratory   Conclusions   There is no evidence of arterial disease demonstrated (PADMINI is .9-1.0).    Absent flow within the Left VIVIENNE.     Vascular Laboratory   CONCLUSIONS   Right Lower Extremity-   Normal flow from the common femoral artery extending distally to the    popliteal artery. Flow within the posterior tibial artery is brisk and    multiphasic.   Occlusion of the anterior tibial artery at the prox-mid level with    reconstitution of flow seen at the distal level.     Left Lower Extremity-   Normal flow seen from the common femoral artery extending distally to the    popliteal artery.   Area of elevated velocities seen within the mid segment of the posterior    tibial artery. Consistent with >50% stenosis.   Occlusion of the anterior tibial artery at the mid-distal segment, minimal    flow reversal seen at the level of the ankle.      MRI 12/31/2016  7 mm acute/early subacute infarct is present in the right anterolateral medulla.  There is no significant mass effect or hemorrhage.  No other recent infarction.    12/31/2016:  Three sites of abnormal parenchymal enhancement are present.  *  At the previously seen right anterior pontine lesion, there is a 5 x 3 x 7 mm  elongated enhancing peripheral pontine lesion.  *  There is a second 2 mm enhancing focus also in the right anterior david.  *  There is a third 3 mm lesion at the left globus pallidus internus.    These findings may represent metastatic disease.  The elongated morphology of the anterior peripheral pontine lesion is somewhat atypical for metastatic lesion, and could potentially represent enhancement of a subacute infarct.  Two other lesions could also, in theory, represent subacute enhancing reperfusion of lacunar infarctions. Anecdotally, this would be an unusual finding in lacunar infarctions. Note that post infarct enhancement typically occurs approximately 1 week after infarction and resolves within 12 weeks or less.       Head CTA 2016:  1. There are plaque formations identified in both carotid bifurcations   (right greater than left). This causes approximately 50% stenosis on the   right and 10-20% stenosis on the left in the proximal internal carotid   arteries. The remainder of the carotid   vasculature is unremarkable.  2. The vertebral arteries are within normal limits.  3. No dissection.  4. A 5.5 mm left thyroid lobe cyst/nodule is present.  5. A 10 x 13 mm partially calcified pulmonary nodule is present in the   left upper lobe. There may be some internal fat density. Findings could   represent a benign pulmonary hamartoma. Recommend clinical correlation.  6. Moderate to advanced degenerative changes are present throughout the   cervical spine.      LE ultrasound 2022:      Vascular Laboratory   CONCLUSIONS   No e/o BLE DVT.    DVT ultrasound 2022:  Vascular Laboratory   CONCLUSIONS   1.  Acute appearing RIGHT posterior tibial vein DVT.   2.  Subacute appearing LEFT common femoral vein DVT and proximal femoral    vein junction DVT.      Medical Decision Makin. Atherosclerosis of native coronary artery of native heart without angina pectoris  S/p 5 stents, last in . Mild ischemia on two  recent nuclear perfusion scans    Currently without recurrent chest pain (had some atypical chest pain 2017) so will treat medically.  -continue aspirin/lipitor  Last LDL 80, did increase lipitor to 80, will f/u with next labs to achieve LDL <70.     2. 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 Essential hypertension, benign  Poorly controlled  -increase norvasc to 5mg PO daily  -decrease coreg due to bradycardia to 3.125mg PO daily. May need to stop.   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   -Continue losartan 50mg PO bid, hold for SBP <100  -hydralazine 10mg PO tid prn SBO >160.     4. Paroxysmal atrial fibrillation (CMS-HCC)  Self limited and one time in setting of sepsis. Previously discussed at length risks of recurrent CVA given his previous likely embolic event, and if he would like an ILR to confirm diagnosis or to start empiric anticoagulation.  He prefers the later and we started xarelto 4/2018.  Unfortunately he had melena almost immediately, and was switched back to aspirin/plavix.  He does not want to retrial anticoagulation, or a GI referral as he sees enough physicians already.   -continue plavix. No anticoag for now given patient preference despite risk of recurrent CVA. Also stopped aspirin due to bleeding as well.    -continue coreg, may need to stop due to bradycardia  -check 1 week ziopatch to assess bradycardia. Will ship monitor to them as it is very hard for them to get to appointments.     5. CKD (chronic kidney disease) stage 3, GFR 30-59 ml/min  Stable.  -avoid spironolactone  -continue losartan  -followed by Dr. Jarvis.     6. Diffuse large cell non-Hodgkin's lymphoma (CMS-[facility])  Followed by Dr. Noel, in remission per report with recent negative PET scan.    7. Left hemiparesis (CMS-HCC)  Acute 12/2016 at [facility].   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  Symptoms originally improved significantly with chemotherapy and he was walking well, and then hemiparesis worsened significantly after sepsis from UTI and norovirus infection mid 2017.  Currently continuing physical therapy, but remains in a wheelchair.     8. Cerebrovascular accident (CVA) due to embolism of cerebral artery (CMS-HCC)  In addition to cerebral enhancing lesions thought to be lymphoma on MRI 12/2016, he was found to have a subacute infarct in the right anterolateral medulla.    -aspirin/plavix for CVA prevention    9. Dyslipidemia  Lipitor, fenofibrate. Increase lipitor back to 80mg PO daily.  -recheck lipid panel in 2 months or so.     10. Peripheral vascular disease (CMS-HCC)  Followed by Dr. Terry previously but now followed by Dr. Mohsen at [facility]. No recent intervention. Contributing to non-healing wounds.   -continue aspirin/plavix     11. Goals of care - AD is completed, they have scanned in a card with the number to cloud access to their documents. We did discuss his prognosis during a code previously and their previous code status and AD discussions in the past. He stated he would like to be full code despite his medical comorbidities. We filled out a POLST stating such and will continue our conversations about this in the future.       Return in about 6 months (around 5/3/2023).       Osvaldo Tucker MD  [facility]  [facility], [address]  [address] [address]  Phone: [phone]  Fax: [phone]

## 2022-11-07 NOTE — PROGRESS NOTES
Home enrollment completed in the 7 day Zio XT Holter monitoring program, per Osvaldo Tucker M.D.  Monitor will be shipped to patient via [employer].  >Pending EOS.

## 2022-11-08 ENCOUNTER — NON-PROVIDER VISIT (OUTPATIENT)
Dept: CARDIOLOGY | Facility: MEDICAL CENTER | Age: 77
End: 2022-11-08
Attending: INTERNAL MEDICINE
Payer: MEDICARE

## 2022-11-08 DIAGNOSIS — I47.10 PSVT (PAROXYSMAL SUPRAVENTRICULAR TACHYCARDIA) (HCC): ICD-10-CM

## 2022-11-08 DIAGNOSIS — I47.29 NSVT (NONSUSTAINED VENTRICULAR TACHYCARDIA) (HCC): ICD-10-CM

## 2022-11-08 DIAGNOSIS — I49.3 FREQUENT PVCS: ICD-10-CM

## 2022-11-08 DIAGNOSIS — R00.1 BRADYCARDIA: ICD-10-CM

## 2022-11-10 ENCOUNTER — PATIENT MESSAGE (OUTPATIENT)
Dept: HEALTH INFORMATION MANAGEMENT | Facility: OTHER | Age: 77
End: 2022-11-10

## 2022-11-11 ENCOUNTER — PATIENT MESSAGE (OUTPATIENT)
Dept: CARDIOLOGY | Facility: MEDICAL CENTER | Age: 77
End: 2022-11-11
Payer: MEDICARE

## 2022-11-17 DIAGNOSIS — F33.41 RECURRENT MAJOR DEPRESSIVE DISORDER, IN PARTIAL REMISSION (HCC): ICD-10-CM

## 2022-11-17 RX ORDER — FLUOXETINE HYDROCHLORIDE 40 MG/1
CAPSULE ORAL
Qty: 100 CAPSULE | Refills: 0 | Status: SHIPPED | OUTPATIENT
Start: 2022-11-17 | End: 2023-02-15 | Stop reason: SDUPTHER

## 2022-11-17 NOTE — TELEPHONE ENCOUNTER
Received request via: Patient    Was the patient seen in the last year in this department? Yes    Does the patient have an active prescription (recently filled or refills available) for medication(s) requested? No    Does the patient have longterm Plus and need 100 day supply (blood pressure, diabetes and cholesterol meds only)? Yes, quantity updated to 100 days

## 2022-11-23 ENCOUNTER — TELEPHONE (OUTPATIENT)
Dept: CARDIOLOGY | Facility: MEDICAL CENTER | Age: 77
End: 2022-11-23
Payer: MEDICARE

## 2022-11-28 PROCEDURE — 93248 EXT ECG>7D<15D REV&INTERPJ: CPT | Performed by: INTERNAL MEDICINE

## 2022-11-30 ENCOUNTER — PATIENT OUTREACH (OUTPATIENT)
Dept: HEALTH INFORMATION MANAGEMENT | Facility: OTHER | Age: 77
End: 2022-11-30
Payer: MEDICARE

## 2022-11-30 DIAGNOSIS — Z79.4 TYPE 2 DIABETES MELLITUS WITH STAGE 3B CHRONIC KIDNEY DISEASE, WITH LONG-TERM CURRENT USE OF INSULIN (HCC): ICD-10-CM

## 2022-11-30 DIAGNOSIS — N18.32 TYPE 2 DIABETES MELLITUS WITH STAGE 3B CHRONIC KIDNEY DISEASE, WITH LONG-TERM CURRENT USE OF INSULIN (HCC): ICD-10-CM

## 2022-11-30 DIAGNOSIS — E11.22 TYPE 2 DIABETES MELLITUS WITH STAGE 3B CHRONIC KIDNEY DISEASE, WITH LONG-TERM CURRENT USE OF INSULIN (HCC): ICD-10-CM

## 2022-11-30 DIAGNOSIS — I25.10 CORONARY ARTERY DISEASE INVOLVING NATIVE CORONARY ARTERY OF NATIVE HEART WITHOUT ANGINA PECTORIS: ICD-10-CM

## 2022-11-30 PROCEDURE — 99999 PR NO CHARGE: CPT | Performed by: INTERNAL MEDICINE

## 2022-11-30 NOTE — PROGRESS NOTES
Nurse CM outreach call for monthly CCM assessment. Nurse spoke to patient's spouse, Usha.  Spouse reports pt doing better. Reports pt feeling better since no longer taking Xarelto. Reports they have been following with Dr. Tucker and adjusting blood pressure medications. States patient had Zio patch monitor done for low heart rate. Spouse states patient's pulse earlier this morning was 42 with B/P 137/78. Spouse did another check on HR while talking to nurse. States patient's HR is now 93. Spouse will notify cardiology if HR readings in the 40's. Spouse states patient taking Coreg 3.125 mg tablets two times daily. Spouse states blood sugar readings have been in good range. Reports 167 last night, and 86 this am. Reports pt taking 2 units of Toujeo at bedtime. Reports no low blood sugar readings. Spouse states pt eating well. States overall patient feeling well. Pt scheduled to see PCP on 12/22 at 5:00pm;  Plan:  Spouse will continue to monitor pt vitals including blood sugar. Spouse will notify cardiology if pulse rate low or elevated B/P readings. Spouse will bring log of reading to PCP appt. Spouse will call nurse CM if needing any assistance.

## 2022-12-01 NOTE — PATIENT INSTRUCTIONS
Urinary Tract Infection, Adult  Introduction  A urinary tract infection (UTI) is an infection of any part of the urinary tract. The urinary tract includes the:  · Kidneys.  · Ureters.  · Bladder.  · Urethra.  These organs make, store, and get rid of pee (urine) in the body.  Follow these instructions at home:  · Take over-the-counter and prescription medicines only as told by your doctor.  · If you were prescribed an antibiotic medicine, take it as told by your doctor. Do not stop taking the antibiotic even if you start to feel better.  · Avoid the following drinks:  ¨ Alcohol.  ¨ Caffeine.  ¨ Tea.  ¨ Carbonated drinks.  · Drink enough fluid to keep your pee clear or pale yellow.  · Keep all follow-up visits as told by your doctor. This is important.  · Make sure to:  ¨ Empty your bladder often and completely. Do not to hold pee for long periods of time.  ¨ Empty your bladder before and after sex.  ¨ Wipe from front to back after a bowel movement if you are female. Use each tissue one time when you wipe.  Contact a doctor if:  · You have back pain.  · You have a fever.  · You feel sick to your stomach (nauseous).  · You throw up (vomit).  · Your symptoms do not get better after 3 days.  · Your symptoms go away and then come back.  Get help right away if:  · You have very bad back pain.  · You have very bad lower belly (abdominal) pain.  · You are throwing up and cannot keep down any medicines or water.  This information is not intended to replace advice given to you by your health care provider. Make sure you discuss any questions you have with your health care provider.  Document Released: 06/05/2009 Document Revised: 05/25/2017 Document Reviewed: 11/07/2016  © 2017 Elsevier    
Strong peripheral pulses

## 2022-12-11 ENCOUNTER — PATIENT MESSAGE (OUTPATIENT)
Dept: CARDIOLOGY | Facility: MEDICAL CENTER | Age: 77
End: 2022-12-11
Payer: MEDICARE

## 2022-12-11 DIAGNOSIS — I10 ESSENTIAL HYPERTENSION, BENIGN: ICD-10-CM

## 2022-12-13 RX ORDER — AMLODIPINE BESYLATE 10 MG/1
10 TABLET ORAL DAILY
Qty: 90 TABLET | Refills: 3 | Status: SHIPPED | OUTPATIENT
Start: 2022-12-13 | End: 2023-11-29 | Stop reason: SDUPTHER

## 2022-12-22 ENCOUNTER — OFFICE VISIT (OUTPATIENT)
Dept: MEDICAL GROUP | Facility: PHYSICIAN GROUP | Age: 77
End: 2022-12-22
Payer: MEDICARE

## 2022-12-22 VITALS
SYSTOLIC BLOOD PRESSURE: 120 MMHG | HEIGHT: 70 IN | RESPIRATION RATE: 15 BRPM | TEMPERATURE: 97.1 F | DIASTOLIC BLOOD PRESSURE: 70 MMHG | OXYGEN SATURATION: 98 % | HEART RATE: 78 BPM | BODY MASS INDEX: 25.17 KG/M2 | WEIGHT: 175.8 LBS

## 2022-12-22 DIAGNOSIS — Z79.4 TYPE 2 DIABETES MELLITUS WITH STAGE 3B CHRONIC KIDNEY DISEASE, WITH LONG-TERM CURRENT USE OF INSULIN (HCC): ICD-10-CM

## 2022-12-22 DIAGNOSIS — E11.59 HYPERTENSION ASSOCIATED WITH DIABETES (HCC): ICD-10-CM

## 2022-12-22 DIAGNOSIS — N18.32 TYPE 2 DIABETES MELLITUS WITH STAGE 3B CHRONIC KIDNEY DISEASE, WITH LONG-TERM CURRENT USE OF INSULIN (HCC): ICD-10-CM

## 2022-12-22 DIAGNOSIS — I15.2 HYPERTENSION ASSOCIATED WITH DIABETES (HCC): ICD-10-CM

## 2022-12-22 DIAGNOSIS — E11.22 TYPE 2 DIABETES MELLITUS WITH STAGE 3B CHRONIC KIDNEY DISEASE, WITH LONG-TERM CURRENT USE OF INSULIN (HCC): ICD-10-CM

## 2022-12-22 LAB
HBA1C MFR BLD: 6.5 % (ref 0–5.6)
INT CON NEG: ABNORMAL
INT CON POS: ABNORMAL

## 2022-12-22 PROCEDURE — 99214 OFFICE O/P EST MOD 30 MIN: CPT | Performed by: INTERNAL MEDICINE

## 2022-12-22 PROCEDURE — 83036 HEMOGLOBIN GLYCOSYLATED A1C: CPT | Performed by: INTERNAL MEDICINE

## 2022-12-22 RX ORDER — BLOOD SUGAR DIAGNOSTIC
STRIP MISCELLANEOUS
COMMUNITY
Start: 2022-12-06 | End: 2023-04-17 | Stop reason: SDUPTHER

## 2022-12-22 ASSESSMENT — FIBROSIS 4 INDEX: FIB4 SCORE: 1.03

## 2022-12-22 NOTE — LETTER
[place]  Madison Bunch D.O.  [address]  [place] [address]  Fax: [phone]   Authorization for Release/Disclosure of   Protected Health Information   Name: ELIGIO MADRIGAL : 1945 SSN: [id number]   Address: [address]  [address]  [place] [address] Phone:    [phone] (home)    I authorize the entity listed below to release/disclose the PHI below to:   [place]/Madison Bunch D.O. and Madison Bunch D.O.   Provider or Entity Name[facility]  [facility]   Address   City, [facility]   Phone:      Fax:     Reason for request: continuity of care   Information to be released:    [  ] LAST COLONOSCOPY,  including any PATH REPORT and follow-up  [  ] LAST FIT/COLOGUARD RESULT [  ] LAST DEXA  [  ] LAST MAMMOGRAM  [  ] LAST PAP  [  ] LAST LABS [  ] RETINA EXAM REPORT  [  ] IMMUNIZATION RECORDS  [ x ] Release all info      [ x ] Check here and initial the line next to each item to release ALL health information INCLUDING  _____ Care and treatment for drug and / or alcohol abuse  _____ HIV testing, infection status, or AIDS  _____ Genetic Testing    DATES OF SERVICE OR TIME PERIOD TO BE DISCLOSED: ___2022__________  I understand and acknowledge that:  * This Authorization may be revoked at any time by you in writing, except if your health information has already been used or disclosed.  * Your health information that will be used or disclosed as a result of you signing this authorization could be re-disclosed by the recipient. If this occurs, your re-disclosed health information may no longer be protected by State or Federal laws.  * You may refuse to sign this Authorization. Your refusal will not affect your ability to obtain treatment.  * This Authorization becomes effective upon signing and will  on (date) __________.      If no date is indicated, this Authorization will  one (1) year from the signature date.    Name:  Av Bob    Signature:   Date:     12/22/2022       PLEASE FAX REQUESTED RECORDS BACK TO: [phone]

## 2022-12-23 NOTE — PROGRESS NOTES
Subjective:   Chief Complaint/History of Present Illness:  Av Bob is a 77 y.o. male established patient who presents today to discuss medical problems as listed below. Av is accompanied by his wife, Usha.    Problem   ([facility]) Hypertension associated with diabetes    He has a history of hypertension with dose reduction over the past year due to signs/symptoms of over treatment.    Home blood pressure ranges 110's-140's/60-70     He follows with both cardiology, urology, nephrology.    Current regimen: Amlodipine 10 mg daily, carvedilol 3.125 mg twice daily, losartan 50 mg twice daily, and hydralazine as needed for SBP greater than 160     Type 2 Diabetes Mellitus With Stage 3b Chronic Kidney Disease, With Long-Term Current Use of Insulin ([facility])     Latest Reference Range & Units 12/22/22 17:37   Glycohemoglobin 0.0 - 5.6 % 6.5 !       Longstanding history of type 2 diabetes on insulin.  Previously followed with endocrinology, Dr. Rasheed.     Current regimen includes Toujeo 0-3 units daily, Humalog 5 units for sugars between 101-150, increase by 2 units if greater than 150.  Correction dosage is 2: 50 greater than 150.    Holding Trulicity since most recent hospitalization in June 2022.     Complicated by retinal involvement, neuropathy, CKDIII-IV, and microalbuminuria.    I recommend CGM for the patient for ongoing safety and dose adjustment.          Current Medications:  Current Outpatient Medications Ordered in Epic   Medication Sig Dispense Refill    ONETOUCH ULTRA strip USE TO TEST BLOOD SUGAR TWICE DAILY      amLODIPine (NORVASC) 10 MG Tab Take 1 Tablet by mouth every day. 90 Tablet 3    fluoxetine (PROZAC) 40 MG capsule TAKE 1 CAPSULE BY MOUTH EVERY  Capsule 0    fenofibrate (TRICOR) 145 MG Tab Take 1 Tablet by mouth every day. 100 Tablet 3    atorvastatin (LIPITOR) 80 MG tablet Take 1 Tablet by mouth every evening. 100 Tablet 3    carvedilol (COREG) 3.125 MG Tab Take 1 Tablet by  mouth 2 times a day with meals. 200 Tablet 3    hydrALAZINE (APRESOLINE) 10 MG Tab Take 1 Tablet by mouth 3 times a day as needed (SBP >160). 100 Tablet 3    clopidogrel (PLAVIX) 75 MG Tab Take 1 Tablet by mouth every day. 90 Tablet 3    gabapentin (NEURONTIN) 100 MG Cap Take 1 Capsule by mouth 3 times a day as needed (pain). 90 Capsule 5    losartan (COZAAR) 50 MG Tab Take 1 Tablet by mouth 2 times a day. 180 Tablet 2    allopurinol (ZYLOPRIM) 100 MG Tab Take 1 Tablet by mouth every evening. 100 Tablet 3    hydrocortisone (ANUSOL-HC) 25 MG Suppos Insert 1 Suppository into the rectum every 12 hours. 12 Suppository 1    lidocaine 2 % Gel Apply 1 Application topically as needed (rectal pain). 30 mL 1    Insulin Pen Needle (PEN NEEDLES) 32G X 4 MM Misc 1 Each 5 Times a Day. USE FOR INSULIN SHOTS FIVE TIMES DAILY 500 Each 3    Blood Glucose Test Strips 1 Strip 2 times a day. One Touch Ultra 200 Strip 3    finasteride (PROSCAR) 5 MG Tab Take 5 mg by mouth every evening.      Non Formulary Request Take 250 mg by mouth 2 times a day. Floastor (Probiotic)      Insulin Glargine, 2 Unit Dial, (TOUJEO MAX SOLOSTAR) 300 UNIT/ML Solution Pen-injector Inject 2 Units under the skin at bedtime.      traMADol (ULTRAM) 50 MG Tab Take 50 mg by mouth every 6 hours as needed for Severe Pain.      Multiple Vitamin (MULTIVITAMIN PO) Take 1 Tablet by mouth every morning.      Cholecalciferol (VITAMIN D) 2000 UNIT Tab Take 2,000 Units by mouth every morning.      Potassium Chloride CR (MICRO-K) 8 MEQ Cap CR capsule Take 1 Capsule by mouth every 48 hours. 45 Capsule 3    insulin lispro (HUMALOG,ADMELOG) 100 UNIT/ML Solution Pen-injector injection PEN Inject 5 Units under the skin 3 times a day before meals. Humalog 5 units for sugars between 101-150, increase by 2 units if greater than 150.  Correction dosage is 2:50 greater than 150. 9 mL 3    magnesium oxide (MAG-OX) 400 MG Tab tablet Take 800 mg by mouth 2 times a day.      omeprazole  "(PRILOSEC) 20 MG delayed-release capsule Take 1 Capsule by mouth every day. 30 Capsule 2    acetaminophen (TYLENOL) 325 MG Tab Take 2 Tablets by mouth every 6 hours as needed for Mild Pain, Moderate Pain or Fever. 30 Tablet 0    Insulin Pen Needle 32 G x 4 mm        No current Epic-ordered facility-administered medications on file.          Objective:   Physical Exam:    Vitals: /70   Pulse 78   Temp 36.2 °C (97.1 °F) (Temporal)   Resp 15   Ht 1.778 m (5' 10\")   Wt 79.7 kg (175 lb 12.8 oz)   SpO2 98%    BMI: Body mass index is 25.22 kg/m².  Physical Exam  Constitutional:       General: He is not in acute distress.     Appearance: He is not toxic-appearing.      Comments: Chronically ill appearing, bright demeanor, conversant, good energy   Eyes:      General: No scleral icterus.     Conjunctiva/sclera: Conjunctivae normal.   Cardiovascular:      Rate and Rhythm: Normal rate and regular rhythm.      Pulses: Normal pulses.   Pulmonary:      Effort: Pulmonary effort is normal. No respiratory distress.      Breath sounds: No wheezing, rhonchi or rales.   Musculoskeletal:      Comments: Mild dependent edema   Skin:     General: Skin is warm and dry.      Findings: Bruising present.   Neurological:      Gait: Gait abnormal.      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         Behavior: Behavior normal.         Thought Content: Thought content normal.         Judgment: Judgment normal.          Assessment and Plan:   Av is a 77 y.o. male with the following:  Problem List Items Addressed This Visit       Type 2 diabetes mellitus with stage 3b chronic kidney disease, with long-term current use of insulin (HCC)     Chronic and ongoing problem.  His insulin needs have gone down significantly.  Due to his other comorbidities I think it would be an excellent idea for him to transition to a continuous glucose monitor such as julia or Dexcom.  Not " sure what his insurance covers.  He continues to reduce his Toujeo, currently somewhere around 0 to 3 units nightly and he uses short acting lispro 5 units on a sliding scale.  He received a notification that his insurance was going to increase the tier coverage for the lispro but they did not give him any alternative medications or cost prediction.  No longer on GLP-1 agonist. Retinal eye exam completed 11/16/2022 with Dr. Cox, records requested, no injections indicated per patient. Due to neurogenic bladder cannot obtain urine microalbumin, he is on ARB.         Relevant Orders    POCT  A1C (Completed)    (HCC) Hypertension associated with diabetes     Chronic and stable problem, we reviewed his home blood pressure readings in clinic and I think they are at goal, recommend continuing current regiment with carvedilol 3.125 mg twice daily, losartan 50 mg twice daily, and amlodipine 10 mg daily.  He has hydralazine to use as needed for elevated blood pressure.  If heart rate declines and he is symptomatic could consider stopping carvedilol per Dr. Tucker's most recent note of cardiology.  Monitor for edema on the increased dose of amlodipine.               RTC: Return in about 4 months (around 4/22/2023).    I spent a total of 38 minutes with record review, exam, communication with the patient, communication with other providers, and documentation of this encounter.    PLEASE NOTE: This dictation was created using voice recognition software. I have made every reasonable attempt to correct obvious errors, but I expect that there are errors of grammar and possibly content that I did not discover before finalizing the note.      Mad[place] Bunch, DO  Geriatric and Internal Medicine  [facility]

## 2022-12-28 DIAGNOSIS — I69.954 HEMIPLEGIA AND HEMIPARESIS FOLLOWING UNSPECIFIED CEREBROVASCULAR DISEASE AFFECTING LEFT NON-DOMINANT SIDE (HCC): ICD-10-CM

## 2023-01-01 NOTE — PROGRESS NOTES
Endocrinology Clinic Progress Note  PCP: Mitchell Pantoja M.D.    HPI:  Av Bob is a 74 y.o. old patient who comes in today for routine follow up of Management of Uncontrolled Type 2 Diabetes, Vitamin D Deficiency, Hypertension, and Hyperlipidemia.  He was hospitalized over night for a urinary tract infection.  His blood sugars have gotten better after starting antibiotics.  He had been seeing [facility] for lower extremity wounds but was discharged.  His wife is concerned that the right wound around his ankle is getting redder and more painful.  It has gotten better with starting the antibiotics for his urinary tract infection.     Vitamin D Deficiency: Currently on Vitamin D 2000 iu BID    Hypertension:  Controlled with Losartan 25 mg per day, Spironolactone 50 mg perday, and Metoprolol  mg daily.    Hyperlipidemia controlled with Atorvastatin 40 mg per day    HPI:  Av Bob is a 74 y.o. old patient who comes in today for evaluation of above stated problem.    Most Recent HbA1c:   Lab Results   Component Value Date/Time    HBA1C 6.9 (A) 07/09/2019 10:51 AM        Current Diabetes Regimen:  GLP-1 Agent: Dulaglutide 1.5 mg once weekly   SGLT-2 Inhibitor:  Farxiga 5 mg was stopped due to frequent yeast and urinary tract infections.     Basal Insulin: Toujeo 40 units daily    Prandial Insulin: Humalog insulin taking 0-5 units before meals (if blood sugar is less than 100 he doesn't take any, 100-150 =5 units, and then 2:50 over 150    Testing 4 times daily  See log in media  Hypoglycemia:  None    ROS:  Constitutional: No weight loss  Cardiac: No palpitations or racing heart  Resp: No shortness of breath  Neuro: No numbness or tinging in feet  Endo: No heat or cold intolerance, no polyuria or polydipsia  All other systems were reviewed and were negative.    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, without long-term current use of insulin 12/30/2016     Priority: Medium   • H/O Cerebrovascular accident (CVA) in adulthood 12/30/2016     Priority: Medium   • Coronary artery disease involving native coronary artery of native heart without angina pectoris 12/30/2016     Priority: Medium   • (HCC) Hyperlipidemia associated with type 2 diabetes mellitus 12/13/2011     Priority: Medium   • GERD with esophagitis 10/19/2018     Priority: Low   • Recurrent UTI 04/26/2018     Priority: Low   • (HCC) Left hemiparesis 12/27/2017     Priority: Low   • (HCC) Diabetic nephropathy associated with type 2 diabetes mellitus 11/30/2017     Priority: Low   • Psychophysiological insomnia 11/21/2017     Priority: Low   • (HCC) Moderate episode of recurrent major depressive disorder 11/07/2017     Priority: Low   • Wheelchair dependent 11/07/2017     Priority: Low   • Normocytic hypochromic anemia 05/20/2017     Priority: Low   • H/O non-Hodgkin's lymphoma 05/08/2017     Priority: Low   • Acute renal failure superimposed on stage 3 chronic kidney disease (HCC) 08/25/2016     Priority: Low   • Idiopathic chronic gout of multiple sites without tophus 01/28/2014     Priority: Low   • Diarrhea 07/02/2019   • Acute cystitis with hematuria 06/30/2019   • Snapping hip syndrome, right 05/20/2019   • Strain of flexor muscle of right hip 05/20/2019   • Strain of right hamstring 05/20/2019   • Muscle strain of right gluteal region 05/20/2019   • Left hand weakness 05/20/2019   • Urinary incontinence without sensory awareness 02/19/2019   • (HCC) Chronic ulcer of right lower extremity with fat layer exposed 12/27/2018   • (HCC) Tibial artery disease 12/27/2018       Past Surgical History:  Past Surgical  History:   Procedure Laterality Date   • CYSTOSCOPY STENT PLACEMENT Left 2/12/2018    Procedure: CYSTOSCOPY  ;  Surgeon: Rey Barry M.D.;  Location: SURGERY [facility];  Service: Urology   • URETEROSCOPY Left 2/12/2018    Procedure: URETEROSCOPY- FLEXIBLE  ;  Surgeon: Rey Barry M.D.;  Location: SURGERY [facility];  Service: Urology   • LASERTRIPSY Left 2/12/2018    Procedure: LASERTRIPSY - LITHO  ;  Surgeon: Rey Barry M.D.;  Location: SURGERY [facility];  Service: Urology   • WOUND CLOSURE GENERAL  4/3/2012    Performed by GERHARD GILBERT at SURGERY SAME DAY [facility]   • [facility] CARDIAC CATH  2009    Stents to LAD, Om   • DAGOBERTO BY LAPAROSCOPY  1998   • ANGIOPLASTY  1997    RCA followed by other stents as noted above.    • Z CARDIAC CATH  1997    stent RCA   • CATARACT EXTRACTION WITH IOL      bilateral   • LITHOTRIPSY     • TONSILLECTOMY AND ADENOIDECTOMY         Allergies:  Diphenhydramine hcl; Lorazepam; and Ciprofloxacin    Social History:  Social History     Social History   • Marital status:      Spouse name: N/A   • Number of children: N/A   • Years of education: N/A     Occupational History   • Not on file.     Social History Main Topics   • Smoking status: Never Smoker   • Smokeless tobacco: Never Used   • Alcohol use No   • Drug use: No   • Sexual activity: Not Currently     Partners: Female     Other Topics Concern   • Not on file     Social History Narrative   • No narrative on file       Family History:  Family History   Problem Relation Age of Onset   • Heart Disease Father         CAD   • Diabetes Father    • Cancer Mother    • Psychiatry Mother         Depression   • Depression Mother    • Kidney stones Brother    • Heart Disease Brother    • Psychiatry Brother         Depression   • Depression Brother    • Suicide Attempts Other    • Psychiatry Other         autism       Medications:    Current Outpatient Prescriptions:   •  atorvastatin (LIPITOR) 40 MG Tab, Take  40 mg by mouth every evening., Disp: , Rfl:   •  clopidogrel (PLAVIX) 75 MG Tab, Take 75 mg by mouth every evening., Disp: , Rfl:   •  fenofibrate (TRICOR) 145 MG Tab, Take 145 mg by mouth every evening., Disp: , Rfl:   •  magnesium oxide (MAG-OX) 400 (241.3 Mg) MG Tab tablet, Take 800 mg by mouth 2 times a day., Disp: , Rfl:   •  Dulaglutide (TRULICITY) 1.5 MG/0.5ML Solution Pen-injector, Inject 1.5 mg as instructed every 7 days. On Tue, Disp: , Rfl:   •  losartan (COZAAR) 25 MG Tab, Take 2 Tabs by mouth every day. DO NOT RESUME UNTIL Wednesday July 3, Disp: 1 Tab, Rfl: 0  •  Insulin Glargine (TOUJEO MAX SOLOSTAR) 300 UNIT/ML Solution Pen-injector, Inject 40 Units as instructed every bedtime., Disp: , Rfl:   •  insulin lispro, Human, (HUMALOG KWIKPEN) 100 UNIT/ML Solution Pen-injector injection, Inject 5-9 Units as instructed 3 times a day before meals. Sliding Scale, Disp: , Rfl:   •  fluoxetine (PROZAC) 40 MG capsule, TAKE 1 CAPSULE BY MOUTH EVERY DAY, Disp: 90 Cap, Rfl: 2  •  potassium chloride (KLOR-CON) 8 MEQ tablet, Take 1 Tab by mouth every day., Disp: 90 Tab, Rfl: 3  •  metoprolol (TOPROL-XL) 200 MG XL tablet, Take 1 Tab by mouth every evening., Disp: 90 Tab, Rfl: 3  •  DAILY MULTIPLE VITAMINS PO, Take 1 Tab by mouth every day., Disp: , Rfl:   •  ascorbic acid (VITAMIN C) 500 MG tablet, Take 500 mg by mouth every day., Disp: , Rfl:   •  Cholecalciferol (VITAMIN D) 2000 units Cap, Take 2,000 Units by mouth 2 Times a Day., Disp: , Rfl:   •  aspirin 81 MG tablet, Take 81 mg by mouth every day., Disp: , Rfl:   •  diclofenac (SOLARAZE) 3 % gel, Apply 1 Application to affected area(s) as needed (Apply's on lower back and hip)., Disp: , Rfl:   •  acetaminophen (TYLENOL) 500 MG Tab, Take 1,000 mg by mouth every 6 hours as needed for Mild Pain., Disp: , Rfl:     Labs: Reviewed    Physical Examination:  Vital signs: /80   Pulse 60   Ht 1.829 m (6')   Wt 93 kg (205 lb)   SpO2 97%   BMI 27.80 kg/m²  Body  mass index is 27.8 kg/m².  General: No apparent distress, cooperative  Eyes: No scleral icterus or discharge  ENMT: Normal on external inspection of nose, lips, normal thyroid exam  Neck: No abnormal masses on inspection  Resp: Normal effort, clear to auscultation bilaterally   CVS: Regular rate and rhythm, S1 S2 normal, no murmur   Extremities: No edema  Abdomen: abdominal obesity present  Neuro: Alert and oriented  Skin: No rash  Psych: Normal mood and affect, intact memory and able to make informed decisions    Foot Exam:  Monofilament: done  Monofilament testing with a 10 gram force: sensation intact: decreased bilaterally  Visual Inspection: Feet with maceration, ulcers, fissures. Right ankle area ulcerated area.  Stopped going to wound care 2 weeks ago.  Pedal pulses: intact bilaterally    Assessment and Plan:    1. Uncontrolled type 2 diabetes mellitus with hyperglycemia (HCC)  Controlled.  Continue current regimen.  The following was reviewed  - Discussed diabetic diet discussed in detail-plate method.  - He will test before meals and log .  - He will walk for 20-30 minutes daily.  - Discussed importance of immunizations and yearly eye exams. He is seeing Dr. Cox in January 2019.  - Advised daily foot exams. Educated on signs of infection.   - Educated on need to stay well hydrated with water.  - Educated to call with any questions or problems.    2. Essential hypertension  Controlled.    3. Mixed hyperlipidemia  Continue statins.     4. Vitamin D deficiency  Increase vitamin D supplementation to 8000 units daily with food.    Return in about 3 months (around 10/9/2019).    Thank you for allowing me to participate in the care of this patient.    Dr. Fran Rasheed  This note was scribed by Zunilda Chan RN, CDE  07/09/19    CC:   Mitchell Pantoja M.D.    This note was created using voice recognition software (Dragon). The accuracy of the dictation is limited by the abilities of the software. I have  reviewed the note prior to signing, however some errors in grammar and context are still possible. If you have any questions related to this note please do not hesitate to contact our office.    healthy baby

## 2023-01-04 ENCOUNTER — PATIENT OUTREACH (OUTPATIENT)
Dept: HEALTH INFORMATION MANAGEMENT | Facility: OTHER | Age: 78
End: 2023-01-04
Payer: MEDICARE

## 2023-01-04 ENCOUNTER — HOSPITAL ENCOUNTER (OUTPATIENT)
Facility: MEDICAL CENTER | Age: 78
End: 2023-01-04
Attending: INTERNAL MEDICINE
Payer: MEDICARE

## 2023-01-04 DIAGNOSIS — N18.32 TYPE 2 DIABETES MELLITUS WITH STAGE 3B CHRONIC KIDNEY DISEASE, WITH LONG-TERM CURRENT USE OF INSULIN (HCC): ICD-10-CM

## 2023-01-04 DIAGNOSIS — Z79.4 TYPE 2 DIABETES MELLITUS WITH STAGE 3B CHRONIC KIDNEY DISEASE, WITH LONG-TERM CURRENT USE OF INSULIN (HCC): ICD-10-CM

## 2023-01-04 DIAGNOSIS — E11.22 TYPE 2 DIABETES MELLITUS WITH STAGE 3B CHRONIC KIDNEY DISEASE, WITH LONG-TERM CURRENT USE OF INSULIN (HCC): ICD-10-CM

## 2023-01-04 LAB — AMBIGUOUS DTTM AMBI4: NORMAL

## 2023-01-04 PROCEDURE — 80053 COMPREHEN METABOLIC PANEL: CPT

## 2023-01-04 PROCEDURE — 82728 ASSAY OF FERRITIN: CPT

## 2023-01-04 PROCEDURE — 99490 CHRNC CARE MGMT STAFF 1ST 20: CPT | Performed by: INTERNAL MEDICINE

## 2023-01-04 NOTE — PROGRESS NOTES
1/4/23 1:20pm:  Nurse CM outreach call for monthly CCM assessment. Spouse reports they are heading out to an appointment. Requesting nurse CM call back tomorrow. Nurse will contact patient tomorrow for follow-up.     1/5/23 2:35 pm:  Nurse CM outreach call to patient for monthly CCM assessment. VM left with CM contact number. Requesting call back.    1/5/23 Pt's spouse left VM for CM returning call. Nurse attempted to call back. Left VM.     1/6/23 10:20am:  Outreach call to patient/spouse for follow-up on monthly CCM call. Patient's spouse answered telephone. Reports pt has been doing well. Reports blood sugars have been in good range. Denies any low readings. Last A1C 6.5. Spouse states blood pressure readings have been in good range with systolic reading's in 130-140 range. Spouse states no recent falls. Pt utilizing wheelchair for mobility. Spouse states pt gets concerned sometimes if he is constipated.  States she will increase water and fiber in patient's diet. Spouse states patient's last BM was 2 days ago. Spouse states pt currently not taking any stool softeners. Spouse will get stool softener to use if patient having trouble with constipation. Spouse reports overall pt is currently doing well. Spouse will contact nurse CM if needing any care coordination. Nurse will follow-up in one month for CCM program call.

## 2023-01-05 ENCOUNTER — APPOINTMENT (RX ONLY)
Dept: URBAN - METROPOLITAN AREA CLINIC 35 | Facility: CLINIC | Age: 78
Setting detail: DERMATOLOGY
End: 2023-01-05

## 2023-01-05 DIAGNOSIS — D22 MELANOCYTIC NEVI: ICD-10-CM

## 2023-01-05 DIAGNOSIS — Z71.89 OTHER SPECIFIED COUNSELING: ICD-10-CM

## 2023-01-05 DIAGNOSIS — L57.0 ACTINIC KERATOSIS: ICD-10-CM

## 2023-01-05 DIAGNOSIS — L20.89 OTHER ATOPIC DERMATITIS: ICD-10-CM

## 2023-01-05 DIAGNOSIS — Z85.828 PERSONAL HISTORY OF OTHER MALIGNANT NEOPLASM OF SKIN: ICD-10-CM

## 2023-01-05 DIAGNOSIS — L82.1 OTHER SEBORRHEIC KERATOSIS: ICD-10-CM

## 2023-01-05 DIAGNOSIS — L738 OTHER SPECIFIED DISEASES OF HAIR AND HAIR FOLLICLES: ICD-10-CM | Status: RESOLVING

## 2023-01-05 DIAGNOSIS — L663 OTHER SPECIFIED DISEASES OF HAIR AND HAIR FOLLICLES: ICD-10-CM | Status: RESOLVING

## 2023-01-05 DIAGNOSIS — Z87.2 PERSONAL HISTORY OF DISEASES OF THE SKIN AND SUBCUTANEOUS TISSUE: ICD-10-CM

## 2023-01-05 DIAGNOSIS — L73.9 FOLLICULAR DISORDER, UNSPECIFIED: ICD-10-CM | Status: RESOLVING

## 2023-01-05 DIAGNOSIS — L81.4 OTHER MELANIN HYPERPIGMENTATION: ICD-10-CM

## 2023-01-05 PROBLEM — L02.821 FURUNCLE OF HEAD [ANY PART, EXCEPT FACE]: Status: ACTIVE | Noted: 2023-01-05

## 2023-01-05 PROBLEM — L20.84 INTRINSIC (ALLERGIC) ECZEMA: Status: ACTIVE | Noted: 2023-01-05

## 2023-01-05 PROBLEM — D22.5 MELANOCYTIC NEVI OF TRUNK: Status: ACTIVE | Noted: 2023-01-05

## 2023-01-05 PROBLEM — D23.5 OTHER BENIGN NEOPLASM OF SKIN OF TRUNK: Status: ACTIVE | Noted: 2023-01-05

## 2023-01-05 LAB
ALBUMIN SERPL BCP-MCNC: 3.6 G/DL (ref 3.2–4.9)
ALBUMIN/GLOB SERPL: 1.6 G/DL
ALP SERPL-CCNC: 75 U/L (ref 30–99)
ALT SERPL-CCNC: 26 U/L (ref 2–50)
ANION GAP SERPL CALC-SCNC: 10 MMOL/L (ref 7–16)
AST SERPL-CCNC: 37 U/L (ref 12–45)
BILIRUB SERPL-MCNC: 0.4 MG/DL (ref 0.1–1.5)
BUN SERPL-MCNC: 31 MG/DL (ref 8–22)
CALCIUM ALBUM COR SERPL-MCNC: 9.3 MG/DL (ref 8.5–10.5)
CALCIUM SERPL-MCNC: 9 MG/DL (ref 8.5–10.5)
CHLORIDE SERPL-SCNC: 101 MMOL/L (ref 96–112)
CO2 SERPL-SCNC: 23 MMOL/L (ref 20–33)
CREAT SERPL-MCNC: 1.11 MG/DL (ref 0.5–1.4)
FERRITIN SERPL-MCNC: 211 NG/ML (ref 22–322)
GFR SERPLBLD CREATININE-BSD FMLA CKD-EPI: 69 ML/MIN/1.73 M 2
GLOBULIN SER CALC-MCNC: 2.2 G/DL (ref 1.9–3.5)
GLUCOSE SERPL-MCNC: 121 MG/DL (ref 65–99)
POTASSIUM SERPL-SCNC: 4.2 MMOL/L (ref 3.6–5.5)
PROT SERPL-MCNC: 5.8 G/DL (ref 6–8.2)
SODIUM SERPL-SCNC: 134 MMOL/L (ref 135–145)

## 2023-01-05 PROCEDURE — ? LIQUID NITROGEN

## 2023-01-05 PROCEDURE — 99213 OFFICE O/P EST LOW 20 MIN: CPT | Mod: 25

## 2023-01-05 PROCEDURE — 17000 DESTRUCT PREMALG LESION: CPT

## 2023-01-05 PROCEDURE — ? TREATMENT REGIMEN

## 2023-01-05 PROCEDURE — ? COUNSELING

## 2023-01-05 PROCEDURE — ? PRESCRIPTION

## 2023-01-05 RX ORDER — CLINDAMYCIN PHOSPHATE 10 MG/ML
SMALL AMOUNT SOLUTION TOPICAL BID
Qty: 60 | Refills: 3 | Status: ERX

## 2023-01-05 ASSESSMENT — LOCATION SIMPLE DESCRIPTION DERM
LOCATION SIMPLE: LEFT EAR
LOCATION SIMPLE: LEFT SHOULDER
LOCATION SIMPLE: LEFT FOREARM
LOCATION SIMPLE: LEFT SCALP
LOCATION SIMPLE: RIGHT HAND
LOCATION SIMPLE: LEFT UPPER BACK
LOCATION SIMPLE: NOSE
LOCATION SIMPLE: RIGHT FOREARM
LOCATION SIMPLE: RIGHT SHOULDER
LOCATION SIMPLE: ABDOMEN
LOCATION SIMPLE: RIGHT EYEBROW
LOCATION SIMPLE: RIGHT UPPER BACK
LOCATION SIMPLE: LEFT HAND
LOCATION SIMPLE: SCALP
LOCATION SIMPLE: LEFT CHEEK

## 2023-01-05 ASSESSMENT — LOCATION DETAILED DESCRIPTION DERM
LOCATION DETAILED: LEFT RADIAL DORSAL HAND
LOCATION DETAILED: LEFT SUPERIOR HELIX
LOCATION DETAILED: RIGHT LATERAL EYEBROW
LOCATION DETAILED: PERIUMBILICAL SKIN
LOCATION DETAILED: LEFT DISTAL DORSAL FOREARM
LOCATION DETAILED: RIGHT DISTAL DORSAL FOREARM
LOCATION DETAILED: RIGHT RADIAL DORSAL HAND
LOCATION DETAILED: RIGHT POSTERIOR SHOULDER
LOCATION DETAILED: RIGHT INFERIOR UPPER BACK
LOCATION DETAILED: LEFT MID-UPPER BACK
LOCATION DETAILED: RIGHT MEDIAL UPPER BACK
LOCATION DETAILED: LEFT DISTAL RADIAL DORSAL FOREARM
LOCATION DETAILED: NASAL DORSUM
LOCATION DETAILED: RIGHT CENTRAL PARIETAL SCALP
LOCATION DETAILED: LEFT CENTRAL SUBMANDIBULAR CHEEK
LOCATION DETAILED: LEFT POSTERIOR SHOULDER
LOCATION DETAILED: LEFT CENTRAL MANDIBULAR CHEEK
LOCATION DETAILED: LEFT MEDIAL FRONTAL SCALP
LOCATION DETAILED: LEFT ANTIHELIX

## 2023-01-05 ASSESSMENT — LOCATION ZONE DERM
LOCATION ZONE: FACE
LOCATION ZONE: EAR
LOCATION ZONE: ARM
LOCATION ZONE: NOSE
LOCATION ZONE: FACE
LOCATION ZONE: HAND
LOCATION ZONE: TRUNK
LOCATION ZONE: SCALP

## 2023-01-05 NOTE — PROCEDURE: TREATMENT REGIMEN
Detail Level: Zone
Initiate Treatment: Clindamycin solution bid
Initiate Treatment: Fluorouracil 5% and Calcipotriene 0.005% creams bid for 4 days on scalp and nose and 6 days on backs of hands.
Initiate Treatment: Triamcinolone 0.1% bid for 2 weeks alternating with 2 weeks off, he has rx

## 2023-01-05 NOTE — PROCEDURE: LIQUID NITROGEN
Show Applicator Variable?: Yes
Detail Level: Detailed
Render Post-Care Instructions In Note?: no
Number Of Freeze-Thaw Cycles: 1 freeze-thaw cycle
Duration Of Freeze Thaw-Cycle (Seconds): 10
Post-Care Instructions: I reviewed with the patient in detail post-care instructions. Patient is to wear sunprotection, and avoid picking at any of the treated lesions. Pt may apply Vaseline to crusted or scabbing areas.
Consent: The patient's consent was obtained including but not limited to risks of crusting, scabbing, blistering, scarring, darker or lighter pigmentary change, recurrence, incomplete removal and infection.

## 2023-01-09 ENCOUNTER — HOSPITAL ENCOUNTER (OUTPATIENT)
Facility: MEDICAL CENTER | Age: 78
End: 2023-01-09
Attending: INTERNAL MEDICINE
Payer: MEDICARE

## 2023-01-09 ENCOUNTER — PATIENT OUTREACH (OUTPATIENT)
Dept: HEALTH INFORMATION MANAGEMENT | Facility: OTHER | Age: 78
End: 2023-01-09
Payer: MEDICARE

## 2023-01-09 DIAGNOSIS — Z79.4 TYPE 2 DIABETES MELLITUS WITH STAGE 3B CHRONIC KIDNEY DISEASE, WITH LONG-TERM CURRENT USE OF INSULIN (HCC): ICD-10-CM

## 2023-01-09 DIAGNOSIS — R30.0 DYSURIA: ICD-10-CM

## 2023-01-09 DIAGNOSIS — E11.22 TYPE 2 DIABETES MELLITUS WITH STAGE 3B CHRONIC KIDNEY DISEASE, WITH LONG-TERM CURRENT USE OF INSULIN (HCC): ICD-10-CM

## 2023-01-09 DIAGNOSIS — N18.32 TYPE 2 DIABETES MELLITUS WITH STAGE 3B CHRONIC KIDNEY DISEASE, WITH LONG-TERM CURRENT USE OF INSULIN (HCC): ICD-10-CM

## 2023-01-09 LAB
APPEARANCE UR: ABNORMAL
BACTERIA #/AREA URNS HPF: ABNORMAL /HPF
BILIRUB UR QL STRIP.AUTO: NEGATIVE
COLOR UR: YELLOW
EPI CELLS #/AREA URNS HPF: NEGATIVE /HPF
GLUCOSE UR STRIP.AUTO-MCNC: NEGATIVE MG/DL
HYALINE CASTS #/AREA URNS LPF: ABNORMAL /LPF
KETONES UR STRIP.AUTO-MCNC: NEGATIVE MG/DL
LEUKOCYTE ESTERASE UR QL STRIP.AUTO: ABNORMAL
MICRO URNS: ABNORMAL
NITRITE UR QL STRIP.AUTO: NEGATIVE
PH UR STRIP.AUTO: 8.5 [PH] (ref 5–8)
PROT UR QL STRIP: >=1000 MG/DL
RBC # URNS HPF: ABNORMAL /HPF
RBC UR QL AUTO: ABNORMAL
SP GR UR STRIP.AUTO: 1.02
TRI-PHOS CRY #/AREA URNS HPF: ABNORMAL /HPF
UROBILINOGEN UR STRIP.AUTO-MCNC: 0.2 MG/DL
WBC #/AREA URNS HPF: ABNORMAL /HPF

## 2023-01-09 PROCEDURE — 87077 CULTURE AEROBIC IDENTIFY: CPT

## 2023-01-09 PROCEDURE — 81001 URINALYSIS AUTO W/SCOPE: CPT

## 2023-01-09 PROCEDURE — 87186 SC STD MICRODIL/AGAR DIL: CPT

## 2023-01-09 PROCEDURE — 87086 URINE CULTURE/COLONY COUNT: CPT

## 2023-01-09 NOTE — PROGRESS NOTES
Pt's spouse called to reports patient has symptoms of UTI. Reports patient has had odor to urine for past week. Reports patient is also complaining of pain with urination. Reports pt not having any confusion or fever. States no blood in depends. Reports urine is darker. Spouse would like recommendations to collect a urine sample if PCP orders urinalysis/culture. Spouse states pt is incontinent of urine. Reports she does have some straight catheter from previous use. Nurse will route message to PCP.     1/9/23 11:45am:  Nurse contacted spouse and informed PCP ordered urinalysis/culture if indicated. Spouse will collect urine specimen from patient doing straight catheter after getting sterile urine container from lab. Spouse has no additional questions or concerns at this time.

## 2023-01-17 NOTE — ASSESSMENT & PLAN NOTE
Chronic, uncontrollled, improving.  Please see notes from same date of service 06/26/18 re: left    32.5

## 2023-02-02 ENCOUNTER — APPOINTMENT (OUTPATIENT)
Dept: PHYSICAL MEDICINE AND REHAB | Facility: MEDICAL CENTER | Age: 78
End: 2023-02-02
Payer: MEDICARE

## 2023-02-09 ENCOUNTER — PATIENT OUTREACH (OUTPATIENT)
Dept: HEALTH INFORMATION MANAGEMENT | Facility: OTHER | Age: 78
End: 2023-02-09
Payer: MEDICARE

## 2023-02-09 DIAGNOSIS — E11.22 TYPE 2 DIABETES MELLITUS WITH STAGE 3B CHRONIC KIDNEY DISEASE, WITH LONG-TERM CURRENT USE OF INSULIN (HCC): ICD-10-CM

## 2023-02-09 DIAGNOSIS — Z79.4 TYPE 2 DIABETES MELLITUS WITH STAGE 3B CHRONIC KIDNEY DISEASE, WITH LONG-TERM CURRENT USE OF INSULIN (HCC): ICD-10-CM

## 2023-02-09 DIAGNOSIS — N18.32 TYPE 2 DIABETES MELLITUS WITH STAGE 3B CHRONIC KIDNEY DISEASE, WITH LONG-TERM CURRENT USE OF INSULIN (HCC): ICD-10-CM

## 2023-02-09 PROCEDURE — 99999 PR NO CHARGE: CPT | Performed by: INTERNAL MEDICINE

## 2023-02-10 NOTE — PROGRESS NOTES
2/9/23 4:40 pm:  Nurse CM outreach call for monthly CCM assessment. VM left with  contact number requesting a call back to nurse at [phone].    2/10/23 4:00 pm:  Patient's spouse, Usha returned CM call.  Monthly CCM assessment completed.      Assessment: Patient's spouse reports patient has been doing okay. Reports patient has been having some numbness and tingling in his his feet at night. Reports this happens intermittently. Reports pt takes gabapentin and tylenol with some relief. Spouse states patient will move his legs around and this also helps with discomfort. Spouse states patient's blood sugars have been in a good range. Reports pt not needing to take Toujeo very often, and when pt does take it is only several units. Reports fasting blood sugar this am was 118. Last night before bed blood sugar 154. Reports no low readings.      Education: Reviewed importance of not going barefoot, watch for any cuts or sores on feet. Continue to work with specialist for pain management.       Care Plan: Reviewed care plan. Pt would like to continue to work on goal of being able to ambulate. Pt does work with specialist that comes to the home several times a week to work on strength and exercise. Spouse reports specialist use to work with pt from the [facility].       Progress: Pt continues to work towards goal.  Diabetes has been well controlled. Last A1C 6.5 on 12/22/22.      Next Outreach:   One month  Nurse Care Coordinator:  Camilla Navarro  Personal Care Management:  [phone]

## 2023-02-10 NOTE — ASSESSMENT & PLAN NOTE
Chronic, stable, well-controlled, taking medication as directed.     BP well-controlled, HLD controlled, DM2 uncontrolled.    same name as above

## 2023-02-13 ENCOUNTER — OFFICE VISIT (OUTPATIENT)
Dept: PHYSICAL MEDICINE AND REHAB | Facility: MEDICAL CENTER | Age: 78
End: 2023-02-13
Payer: MEDICARE

## 2023-02-13 DIAGNOSIS — I69.954 HEMIPLEGIA AND HEMIPARESIS FOLLOWING UNSPECIFIED CEREBROVASCULAR DISEASE AFFECTING LEFT NON-DOMINANT SIDE (HCC): Primary | ICD-10-CM

## 2023-02-13 DIAGNOSIS — R25.2 SPASTICITY: ICD-10-CM

## 2023-02-13 PROCEDURE — 64643 CHEMODENERV 1 EXTREM 1-4 EA: CPT | Performed by: PHYSICAL MEDICINE & REHABILITATION

## 2023-02-13 PROCEDURE — 99999 PR NO CHARGE: CPT | Performed by: PHYSICAL MEDICINE & REHABILITATION

## 2023-02-13 PROCEDURE — 64642 CHEMODENERV 1 EXTREMITY 1-4: CPT | Performed by: PHYSICAL MEDICINE & REHABILITATION

## 2023-02-13 PROCEDURE — 76942 ECHO GUIDE FOR BIOPSY: CPT | Performed by: PHYSICAL MEDICINE & REHABILITATION

## 2023-02-14 NOTE — PROCEDURES
[facility]  Physical Medicine & Rehabilitation Clinic  [facility] [place] [address]  Ph: [phone]    PROCEDURE NOTE    Procedure: Botulinum Injection  Primary Diagnosis & Secondary Diagnoses:     Visit Diagnoses     ICD-10-CM   1. Hemiplegia and hemiparesis following unspecified cerebrovascular disease affecting left non-dominant side (HCC)  I69.954   2. Spasticity  R25.2       INFORMED CONSENT   The risks and benefits of the procedure were explained to the patient, and all questions were answered. Benefits of the injection include spasticity reduction by decrease in muscle activation following toxin injection. Adverse effects from toxin injection include but are not limited to: excessive weakness, infection, breathing and/or swallowing difficulty.  Common adverse effects from the injection itself are pain and bruising. The patient demonstrated good understanding of risks and benefits and consents to botulinum toxin injection.     Received botulinum toxin product in last 3 mos: No  Have recently received abx (aminoglycosides): No  Take anti-spasticity medications: No  Take allergy/cold medicine:  No  Take a sleep medicine: No  Lactating/pregnant: No  Known NMJ disease: No  Human albumin allergy: No    Pre-procedure time out was performed.     PROCEDURE:  Usual sterile procedure was observed with sterile prep with chlorhexidine. Ultrasound was used for needle guidance for the injections in the following muscles. A negative aspiration of blood was obtained, and the following was injected into each muscle.    Botox: left upper extremity  Location Botox Amount in Units   Pec sonya and minor 50 units each muscle (100 total)   Biceps    200 units (4 locations)   Hamstring - semimembranosus 100 units (2 locations)   Hamstring - semitendinosus  100 units (2 locations)   Hamstring - Bicep femoris long head and short head 100 units (2 locations)   Total Units  600 units      Patient did not get as much benefit from  lower extremity Botox at last visit so today we decided to increase the number of muscles targeted in the left upper extremity for maximal benefit.    Injection sites were cleaned with alcohol and band aid was applied afterwards.  The patient tolerated the procedure well.  There were no complications.  The images were uploaded for permanent storage    MEDICATION USED:  200 units of botulinum toxin type A, mixed with 2mL of sterile, preservative-free normal saline in four 1cc syringes, for a total of 100 units in 1 mL. Repeated for other vial.    Total units injected: 600 units  Total units discarded: 0 units    See MAR for Botox details.     Counseling: Pt was instructed to seek immediate medical attention if fever, difficulty breathing, difficulty swallowing, or other concerning sxs arise. RTC in 6 weeks establish efficacy.     Additional: Consider wrist flexors, consider Xeomin for efficacy    PATIENT SUPPLIED - OPTUM    BOTOX 200 unit vials (2 vials)  NDC 1288-8242-83  Lot [id number]  Exp 04/2025    Dr. Lorie Huff DO, MS  Department of Physical Medicine & Rehabilitation  [facility]  [facility]

## 2023-02-15 ENCOUNTER — PATIENT MESSAGE (OUTPATIENT)
Dept: MEDICAL GROUP | Facility: PHYSICIAN GROUP | Age: 78
End: 2023-02-15
Payer: MEDICARE

## 2023-02-15 DIAGNOSIS — F33.41 RECURRENT MAJOR DEPRESSIVE DISORDER, IN PARTIAL REMISSION (HCC): ICD-10-CM

## 2023-02-15 RX ORDER — FLUOXETINE HYDROCHLORIDE 40 MG/1
40 CAPSULE ORAL
Qty: 90 CAPSULE | Refills: 3 | Status: SHIPPED | OUTPATIENT
Start: 2023-02-15 | End: 2024-02-20

## 2023-03-14 ENCOUNTER — PATIENT OUTREACH (OUTPATIENT)
Dept: HEALTH INFORMATION MANAGEMENT | Facility: OTHER | Age: 78
End: 2023-03-14
Payer: MEDICARE

## 2023-03-14 DIAGNOSIS — E11.22 TYPE 2 DIABETES MELLITUS WITH STAGE 3B CHRONIC KIDNEY DISEASE, WITH LONG-TERM CURRENT USE OF INSULIN (HCC): ICD-10-CM

## 2023-03-14 DIAGNOSIS — N18.32 TYPE 2 DIABETES MELLITUS WITH STAGE 3B CHRONIC KIDNEY DISEASE, WITH LONG-TERM CURRENT USE OF INSULIN (HCC): ICD-10-CM

## 2023-03-14 DIAGNOSIS — Z79.4 TYPE 2 DIABETES MELLITUS WITH STAGE 3B CHRONIC KIDNEY DISEASE, WITH LONG-TERM CURRENT USE OF INSULIN (HCC): ICD-10-CM

## 2023-03-14 DIAGNOSIS — I25.10 CORONARY ARTERY DISEASE INVOLVING NATIVE CORONARY ARTERY OF NATIVE HEART WITHOUT ANGINA PECTORIS: ICD-10-CM

## 2023-03-14 PROCEDURE — 99490 CHRNC CARE MGMT STAFF 1ST 20: CPT | Performed by: INTERNAL MEDICINE

## 2023-03-14 SDOH — ECONOMIC STABILITY: INCOME INSECURITY: IN THE LAST 12 MONTHS, WAS THERE A TIME WHEN YOU WERE NOT ABLE TO PAY THE MORTGAGE OR RENT ON TIME?: NO

## 2023-03-14 SDOH — ECONOMIC STABILITY: TRANSPORTATION INSECURITY
IN THE PAST 12 MONTHS, HAS LACK OF TRANSPORTATION KEPT YOU FROM MEETINGS, WORK, OR FROM GETTING THINGS NEEDED FOR DAILY LIVING?: NO

## 2023-03-14 SDOH — ECONOMIC STABILITY: TRANSPORTATION INSECURITY
IN THE PAST 12 MONTHS, HAS THE LACK OF TRANSPORTATION KEPT YOU FROM MEDICAL APPOINTMENTS OR FROM GETTING MEDICATIONS?: NO

## 2023-03-14 SDOH — ECONOMIC STABILITY: HOUSING INSECURITY: IN THE LAST 12 MONTHS, HOW MANY PLACES HAVE YOU LIVED?: 1

## 2023-03-14 ASSESSMENT — SOCIAL DETERMINANTS OF HEALTH (SDOH)
HOW OFTEN DO YOU ATTENT MEETINGS OF THE CLUB OR ORGANIZATION YOU BELONG TO?: NEVER
DO YOU BELONG TO ANY CLUBS OR ORGANIZATIONS SUCH AS CHURCH GROUPS UNIONS, FRATERNAL OR ATHLETIC GROUPS, OR SCHOOL GROUPS?: NO
IN A TYPICAL WEEK, HOW MANY TIMES DO YOU TALK ON THE PHONE WITH FAMILY, FRIENDS, OR NEIGHBORS?: ONCE A WEEK
HOW OFTEN DO YOU GET TOGETHER WITH FRIENDS OR RELATIVES?: TWICE A WEEK
HOW OFTEN DO YOU ATTEND CHURCH OR RELIGIOUS SERVICES?: NEVER

## 2023-03-14 NOTE — PROGRESS NOTES
Nurse CM outreach call to patient for follow-up on CCM program. Spouse answered telephone. Assessment reviewed with spouse.       Assessment:  Spouse reports pt doing good. Reports no concerns at this time. Reports pt has been requiring a little more Toujeo insulin than was previously needing. Spouse states pt getting around 2 units at night, sometimes 4 units if blood sugar elevated. Reports last night blood sugar was 200. Pt received 4 units of Toujeo. Reports blood sugar this am was 91. Reports pt's legs have been bothering him a little more at bedtime. Spouse states one evening pt took three of his gabapentin 100 mg tablets instead of his usual dose of 1-2 tablets. Reports pt slept better after taking the 300 milligrams of gabapentin with Tylenol. Spouse states Dr. Bennett was okay if patient needing to take all three tablets of the 100 mg gabapentin if needed. Spouse reports pt has been getting out sometimes. Pt going to basketball game with family tonight. Pt continues to get some therapy at home two times a week from specialists that does strength exercises with pt.     Education:  continue to monitor pt's blood sugar, check feet for cuts or sores. Follow-up with specialists    Care Plan: Reviewed with spouse    Progress: Continue to work on goals.    Next Outreach:   One month

## 2023-03-27 ENCOUNTER — TELEMEDICINE (OUTPATIENT)
Dept: PHYSICAL MEDICINE AND REHAB | Facility: MEDICAL CENTER | Age: 78
End: 2023-03-27
Payer: MEDICARE

## 2023-03-27 DIAGNOSIS — R25.2 SPASTICITY: ICD-10-CM

## 2023-03-27 DIAGNOSIS — I69.954 HEMIPLEGIA AND HEMIPARESIS FOLLOWING UNSPECIFIED CEREBROVASCULAR DISEASE AFFECTING LEFT NON-DOMINANT SIDE (HCC): Primary | ICD-10-CM

## 2023-03-27 PROCEDURE — 99214 OFFICE O/P EST MOD 30 MIN: CPT | Mod: 95 | Performed by: PHYSICAL MEDICINE & REHABILITATION

## 2023-03-27 NOTE — PROGRESS NOTES
[facility]  PM&R Neuro Rehabilitation Clinic  [facility] [facility] [place] [address]  Ph: [phone]    FOLLOW UP OUTPATIENT EVALUATION      This evaluation was conducted via Zoom using secure and encrypted videoconferencing technology. The patient was in a private location in their home in the [facility].  The patient's identity was confirmed and verbal consent was obtained for this virtual visit.    Patient Name: Av Bob   Patient : 1945  Patient Age: 77 y.o.     Examining Physician: Dr. Lorie Huff, DO    PM&R History to Date - Background Information:  Dates of Admission/Discharge to ARU: Larry Williamson - pavan ; [facility] 2022- 2022    SUBJECTIVE:   Patient Identification: Av Bob is a 77 y.o. 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 Addressed bicep and pec, and hamstring. He has a lot more lateral movement given that we did the pec. This has made it easier to get dressed. The left leg feels better. When he stands he can put the put fully on the floor. He is still doing exercises with the therapist. The Botox helped without a doubt. He received a letter from insurance pharmacy benefit saying that the authorization is expiring. He definitely feels better. They are hoping that with this round of Botox and more therapy he will begin to make some progress in taking steps.     Review of Systems:  All other pertinent positive review of systems are noted above in HPI.   All other systems reviewed and are negative.    Past Medical History:  Past Medical History:    Diagnosis Date    UTI (urinary tract infection) 5/28/2022    Pyelonephritis 5/24/2022    Av had abrupt onset of illness starting on Sunday.  He felt unwell and then had projectile vomiting x3 episodes after dinner that evening.  He had associated nausea but no overt abdominal pain.  He has continued to have anorexia and is having difficulty with his p.o. intake.  He did get in some fruit in a month and yesterday and about 50 to 60 ounces of water.  He has chronic urinary retention    Acute prostatitis 1/13/2022    New problem of prostatitis identified by dispatch health when patient developed hematuria with rectal pain.  He followed up with his urology PA and was placed back on Bactrim.  He was recently on Bactrim and has a history of recurrent urinary tract infections related to neurogenic bladder from prior stroke.  Rectal pain is dissipated however he continues to have hematuria.  He had associated CATA o    Exposure to SARS-associated coronavirus 10/13/2021    He reports viral illness last week with runny nose, congestion, productive cough, and wheezing.  He went to a play of his grandson and may have been exposed to Covid.  He was feeling very bad last Friday and over the weekend and now is at least 50% better.    Hordeolum externum of left lower eyelid 9/14/2021    He reports to clinic with 3 weeks of erythema and pain of the left lower eyelid. No clear inciting cause. Reports no globe pain. Lower > upper eyelid swelling and erythema. After 1.5 weeks had drainage of purulence from the lower medial eyelid. No photosensitivity. Using warm compresses. No changes in visual acuity. Saw retinal specialist around day 1 or 2 of symptoms who felt it could be related     Toenail avulsion, initial encounter- left foot 3rd digit 7/12/2021    They report that his toenail spontaneously came off last week.  He does not recall specific injury or any pain.  His significant other saw the nail on the floor with dried blood  on his foot.  She cleaned the wound and covered it with gauze.  On follow-up in clinic today the gauze has stuck to the wound bed but with saline was fairly easy to remove the dried gauze.  There was scant amount of bright    Pain 06/30/2021    right leg foot    Hypertension 06/30/2021    pt states well controlled on meds    Dysphagia 3/15/2021    He reports progressive worsening of his swallow function.  He notices at least once per week he is choking and coughing, can be with pills or can be with food.  The episodes are quite frightening and he takes a bit of time for him to recover.  He has a prior history of stroke and recalls working with speech-language pathology at that time but does not remember if he did any formal esophagrams or s    Roberto hematuria 1/29/2020    Renal cyst 1/29/2020    Influenza A 1/26/2020    Leg edema, right 1/22/2020    Acute on chronic renal failure (HCC) 1/21/2020    Renal mass 1/21/2020    Hydronephrosis 1/20/2020    History of renal calculi 11/19/2019    Diarrhea 7/2/2019    Acute cystitis without hematuria 6/30/2019    Muscle strain of right gluteal region 5/20/2019    Left hand weakness 5/20/2019    ([facility]) Chronic ulcer of right lower extremity with fat layer exposed 12/27/2018    Enterococcal septicemia ([facility]) 8/12/2017    Hypomagnesemia 08/12/2017    etiology uncertain    NSTEMI (non-ST elevated myocardial infarction) ([facility]) 07/18/2017    complicating UTI with sepsis    Acute respiratory failure with hypoxia ([facility]) 5/20/2017    Septic shock ([facility]) 5/20/2017    Normocytic hypochromic anemia 5/20/2017    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 [1]  He has a history of chronic kidney disease related to nephrolithiasis, recurrent UTIs, and BPH as well as history of hypertension and diabetes.  He is following with nephrology, Dr. Jarvis and urology, Dr. Barry.  Spironolactone was discontinued due     Stroke ([facility]) 2016    left sided weakness    Skin ulcer of calf ([facility]) 2015    Right, Dr. Terry and wound care    Controlled gout 2014    Polyneuropathy in diabetes(357.2) 9/11/2013    Hypokalemia 2012    controlled with combination of ACE inhibitor or ARB plus spironolactone    Wound of left leg 2012    Requiring surgery and debridment, Dr. Moore    Nephrolithiasis 2006    right kidney subsequent lithotripsy by Dr. Barry    CAD (coronary artery disease)     GIOVANNA to RCA in '97, GIOVANNA X2 to LAD and GIOVANNA X2 to OM in '09    Cancer ([facility])     2017; chemo lympoma non hodgkins lymphoma    Cataract     dez iol    Chickenpox     CKD (chronic kidney disease) stage 3, GFR 30-59 ml/min ([facility])     Coronary atherosclerosis of native coronary artery     S/P PTCA (percutaneous transluminal coronary angioplasty), RCA, 5/1997, patent on cath 7/10/2009 at the time of interventions on his left anterior descending and circumflex coronary arteries    Daytime sleepiness     Depression     Diabetes ([facility])     Difficulty swallowing     Frequent urination     GERD (gastroesophageal reflux disease)     Marshallese measles     Insomnia     Kidney stone     Mixed hyperlipidemia     Peripheral vascular disease ([facility])     Urinary bladder disorder     Urinary incontinence     pt wears pads    Weakness       Past Surgical History:   Procedure Laterality Date    IL INJ LUMBAR/SACRAL,W/ IMAGING  7/27/2021    Procedure: Lumbar L5-S1 interlaminar epidural steroid injection;  Surgeon: Harpal Bennett M.D.;  Location: SURGERY  [facility];  Service: Pain Management    IL UPPER GI ENDOSCOPY,DIAGNOSIS N/A 7/21/2021    Procedure: GASTROSCOPY - AND DILATION;  Surgeon: Neville Huerta M.D.;  Location: SURGERY SAME DAY [facility];  Service: Gastroenterology    IL UPPER GI ENDOSCOPY,BIOPSY N/A 7/21/2021    Procedure: GASTROSCOPY, WITH BIOPSY;  Surgeon: Neville Huerta M.D.;  Location: SURGERY SAME DAY [facility];  Service: Gastroenterology    LUMBAR TRANSFORAMINAL EPIDURAL STEROID INJECTION Right 3/29/2021    Procedure: RIGHT L3-4 and L4-5 transforaminal epidural steroid injection with fluoroscopic guidance;  Surgeon: Harpal Bennett M.D.;  Location: SURGERY [facility] PA[facility];  Service: Pain Management    LUMBAR TRANSFORAMINAL EPIDURAL STEROID INJECTION Right 11/2/2020    Procedure: INJECTION, STEROID, SPINE, LUMBAR, EPIDURAL, TRANSFORAMINAL APPROACH;  Surgeon: Harpal Bennett M.D.;  Location: SURGERY REHAB PAIN MANAGEMENT;  Service: Pain Management    CYSTOSCOPY STENT PLACEMENT Left 2/12/2018    Procedure: CYSTOSCOPY  ;  Surgeon: Rey Barry M.D.;  Location: SURGERY [facility];  Service: Urology    URETEROSCOPY Left 2/12/2018    Procedure: URETEROSCOPY- FLEXIBLE  ;  Surgeon: Rey Barry M.D.;  Location: SURGERY [facility];  Service: Urology    LASERTRIPSY Left 2/12/2018    Procedure: LASERTRIPSY - LITHO  ;  Surgeon: Rey Barry M.D.;  Location: SURGERY [facility];  Service: Urology    WOUND CLOSURE GENERAL  4/3/2012    Performed by GERHARD GILBERT at SURGERY SAME DAY [facility] ORS    [facility] CARDIAC CATH  2009    Stents to LAD, Om    DAGOBERTO BY LAPAROSCOPY  1998    ANGIOPLASTY  1997    RCA followed by other stents as noted above.     [facility] CARDIAC CATH  1997    stent RCA    CATARACT EXTRACTION WITH IOL      bilateral    LITHOTRIPSY      TONSILLECTOMY      TONSILLECTOMY AND ADENOIDECTOMY          Current Outpatient Medications:     fluoxetine (PROZAC) 40 MG capsule, Take 1 Capsule by mouth every day., Disp: 90 Capsule,  Rfl: 3    ONETOUCH ULTRA strip, USE TO TEST BLOOD SUGAR TWICE DAILY, Disp: , Rfl:     amLODIPine (NORVASC) 10 MG Tab, Take 1 Tablet by mouth every day., Disp: 90 Tablet, Rfl: 3    fenofibrate (TRICOR) 145 MG Tab, Take 1 Tablet by mouth every day., Disp: 100 Tablet, Rfl: 3    atorvastatin (LIPITOR) 80 MG tablet, Take 1 Tablet by mouth every evening., Disp: 100 Tablet, Rfl: 3    carvedilol (COREG) 3.125 MG Tab, Take 1 Tablet by mouth 2 times a day with meals., Disp: 200 Tablet, Rfl: 3    hydrALAZINE (APRESOLINE) 10 MG Tab, Take 1 Tablet by mouth 3 times a day as needed (SBP >160)., Disp: 100 Tablet, Rfl: 3    clopidogrel (PLAVIX) 75 MG Tab, Take 1 Tablet by mouth every day., Disp: 90 Tablet, Rfl: 3    gabapentin (NEURONTIN) 100 MG Cap, Take 1 Capsule by mouth 3 times a day as needed (pain)., Disp: 90 Capsule, Rfl: 5    losartan (COZAAR) 50 MG Tab, Take 1 Tablet by mouth 2 times a day., Disp: 180 Tablet, Rfl: 2    allopurinol (ZYLOPRIM) 100 MG Tab, Take 1 Tablet by mouth every evening., Disp: 100 Tablet, Rfl: 3    hydrocortisone (ANUSOL-HC) 25 MG Suppos, Insert 1 Suppository into the rectum every 12 hours., Disp: 12 Suppository, Rfl: 1    lidocaine 2 % Gel, Apply 1 Application topically as needed (rectal pain)., Disp: 30 mL, Rfl: 1    Insulin Pen Needle (PEN NEEDLES) 32G X 4 MM Misc, 1 Each 5 Times a Day. USE FOR INSULIN SHOTS FIVE TIMES DAILY, Disp: 500 Each, Rfl: 3    Blood Glucose Test Strips, 1 Strip 2 times a day. One Touch Ultra, Disp: 200 Strip, Rfl: 3    finasteride (PROSCAR) 5 MG Tab, Take 5 mg by mouth every evening., Disp: , Rfl:     Non Formulary Request, Take 250 mg by mouth 2 times a day. Floastor (Probiotic), Disp: , Rfl:     Insulin Glargine, 2 Unit Dial, (TOUJEO MAX SOLOSTAR) 300 UNIT/ML Solution Pen-injector, Inject 2 Units under the skin at bedtime., Disp: , Rfl:     traMADol (ULTRAM) 50 MG Tab, Take 50 mg by mouth every 6 hours as needed for Severe Pain., Disp: , Rfl:     Multiple Vitamin  (MULTIVITAMIN PO), Take 1 Tablet by mouth every morning., Disp: , Rfl:     Cholecalciferol (VITAMIN D) 2000 UNIT Tab, Take 2,000 Units by mouth every morning., Disp: , Rfl:     Potassium Chloride CR (MICRO-K) 8 MEQ Cap CR capsule, Take 1 Capsule by mouth every 48 hours., Disp: 45 Capsule, Rfl: 3    insulin lispro (HUMALOG,ADMELOG) 100 UNIT/ML Solution Pen-injector injection PEN, Inject 5 Units under the skin 3 times a day before meals. Humalog 5 units for sugars between 101-150, increase by 2 units if greater than 150.  Correction dosage is 2:50 greater than 150., Disp: 9 mL, Rfl: 3    magnesium oxide (MAG-OX) 400 MG Tab tablet, Take 800 mg by mouth 2 times a day., Disp: , Rfl:     omeprazole (PRILOSEC) 20 MG delayed-release capsule, Take 1 Capsule by mouth every day., Disp: 30 Capsule, Rfl: 2    acetaminophen (TYLENOL) 325 MG Tab, Take 2 Tablets by mouth every 6 hours as needed for Mild Pain, Moderate Pain or Fever., Disp: 30 Tablet, Rfl: 0    Insulin Pen Needle 32 G x 4 mm, , Disp: , Rfl:   Allergies   Allergen Reactions    Diphenhydramine Anxiety     Pt is able to tolerate  Mg benadryl with less anxiety    Lorazepam Unspecified     Disorientation    Spironolactone Unspecified     Acute kidney injury    Ciprofloxacin Rash     Rash,stomach ache        Past Social History:  Social History     Socioeconomic History    Marital status:      Spouse name: Not on file    Number of children: Not on file    Years of education: Not on file    Highest education level: Not on file   Occupational History    Not on file   Tobacco Use    Smoking status: Never    Smokeless tobacco: Never   Vaping Use    Vaping Use: Never used   Substance and Sexual Activity    Alcohol use: Never    Drug use: Never    Sexual activity: Not Currently     Partners: Female     Comment: , one daughter, 2 grands   Other Topics Concern     Service Yes    Blood Transfusions No    Caffeine Concern No    Occupational Exposure No     Hobby Hazards No    Sleep Concern Yes    Stress Concern No    Weight Concern No    Special Diet No    Back Care No    Exercise Yes    Bike Helmet No    Seat Belt Yes    Self-Exams Yes   Social History Narrative    Av is from [place], [place] and raised in [place]. He has been in [place] since 2004. 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  He has been  to Usha since 2003, they met through a mutual friend. He has a daughter (Kalina) and a step son ([place]).     Social Determinants of Health     Financial Resource Strain: Low Risk     Difficulty of Paying Living Expenses: Not hard at all   Food Insecurity: No Food Insecurity    Worried About Running Out of Food in the Last Year: Never true    Ran Out of Food in the Last Year: Never true   Transportation Needs: No Transportation Needs    Lack of Transportation (Medical): No    Lack of Transportation (Non-Medical): No   Physical Activity: Inactive    Days of Exercise per Week: 0 days    Minutes of Exercise per Session: 0 min   Stress: No Stress Concern Present    Feeling of Stress : Only a little   Social Connections: Moderately Isolated    Frequency of Communication with Friends and Family: Once a week    Frequency of Social Gatherings with Friends and Family: Twice a week    Attends Ep[employer] Services: Never    Active Member of Clubs or Organizations: No    Attends Club or Organization Meetings: Never    Marital Status:    Intimate Partner Violence: Not on file   Housing Stability: Low Risk     Unable to Pay for Housing in the Last Year: No    Number of Places Lived in the Last Year: 1    Unstable Housing in the Last Year: No        Family History:  Family History   Problem Relation Age of Onset    Heart Disease Father          CAD    Diabetes Father     Cancer Mother     Psychiatric Illness Mother         Depression    Depression Mother     Kidney stones Brother     Heart Disease Brother     Psychiatric Illness Brother         Depression    Depression Brother     Suicide Attempts Other     Psychiatric Illness Other         autism       Depression and Opioid Screening  PHQ-9:      6/21/2022     7:15 AM 7/25/2022    10:54 AM 10/17/2022     4:20 PM   Depression Screen (PHQ-2/PHQ-9)   PHQ-2 Total Score 0     PHQ-2 Total Score  6 0   PHQ-9 Total Score  14      Interpretation of PHQ-9 Total Score   Score Severity   1-4 No Depression   5-9 Mild Depression   10-14 Moderate Depression   15-19 Moderately Severe Depression   20-27 Severe Depression     OBJECTIVE:   Vital Signs:  There were no vitals filed for this visit.       Pertinent Labs:  Lab Results   Component Value Date/Time    SODIUM 134 (L) 01/04/2023 02:39 PM    POTASSIUM 4.2 01/04/2023 02:39 PM    CHLORIDE 101 01/04/2023 02:39 PM    CO2 23 01/04/2023 02:39 PM    GLUCOSE 121 (H) 01/04/2023 02:39 PM    BUN 31 (H) 01/04/2023 02:39 PM    CREATININE 1.11 01/04/2023 02:39 PM    CREATININE 1.4 05/28/2008 05:42 PM    BUNCREATRAT 22.0 03/01/2022 02:22 PM    BUNCREATRAT 10 03/27/2017 02:11 PM       Lab Results   Component Value Date/Time    HBA1C 6.5 (A) 12/22/2022 05:37 PM       Lab Results   Component Value Date/Time    WBC 13.9 (H) 10/12/2022 02:04 PM    RBC 4.66 (L) 10/12/2022 02:04 PM    HEMOGLOBIN 11.8 (L) 10/12/2022 02:04 PM    HEMATOCRIT 37.6 (L) 10/12/2022 02:04 PM    MCV 80.7 (L) 10/12/2022 02:04 PM    MCH 25.3 (L) 10/12/2022 02:04 PM    MCHC 31.4 (L) 10/12/2022 02:04 PM    MPV 10.3 10/12/2022 02:04 PM    NEUTSPOLYS 81.10 (H) 08/25/2022 01:47 PM    LYMPHOCYTES 9.50 (L) 08/25/2022 01:47 PM    MONOCYTES 6.10 08/25/2022 01:47 PM    EOSINOPHILS 2.10 08/25/2022 01:47 PM    BASOPHILS 0.30 08/25/2022 01:47 PM    HYPOCHROMIA 1+ 03/21/2012 01:08 PM       Lab Results   Component Value  Date/Time    ASTSGOT 37 01/04/2023 02:39 PM    ALTSGPT 26 01/04/2023 02:39 PM        Physical Exam:   GEN: No apparent distress  HEENT: Head normocephalic, atraumatic.  Sclera nonicteric bilaterally, no ocular discharge appreciated bilaterally.  PULMONARY: Breathing nonlabored on room air, no respiratory accessory muscle use.  Not requiring supplemental oxygen.  SKIN: No appreciable skin breakdown on exposed areas of skin.  PSYCH: Mood and affect within normal limits.  NEURO: Awake alert.  Conversational.  Logical thought content.      ASSESSMENT/PLAN: Av Bob is a 77 y.o. male with rehabilitation history significant for R CVA 12/31/16 with residual left hemiparesis, general debility, here for evaluation. The following plan was discussed with the patient who is in agreement.     Visit Diagnoses     ICD-10-CM   1. Hemiplegia and hemiparesis following unspecified cerebrovascular disease affecting left non-dominant side (HCC)  I69.954   2. Spasticity  R25.2        Wife, Usha, assists with history.    Rehab/Neuro:   1. 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  States level of debility is out of proportion for how well he recovered after stroke. Even before norovirus virus, while in chemo was able to go to [place], no assistive device. Can use walker a little bit, but fatigues. Also can walk with cane with exercise  or therapy.  2. General debility: ?  CIP/CIM when acutely hospitalized several years ago with norovirus  3. Fatigue: Secondary to debility vs post stroke fatigue vs sleep apnea versus combination of all of the aforementioned.  Has BiPAP, does not tolerate BiPAP.  Also has low T, followed by endocrinology.  -Med management: Xarelto (positive DVT 6/18/2022)  -Therapy: Continue therapy as he is making some improvements with his last round of Botox.  He is  able to put his foot flat on the ground to stand.  It is also easier for him to do ADLs such as dressing due to the increase in flexibility in his shoulder abduction.    Spasticity: Botox significantly helped with left elbow pain.  - Referral: Physiatry for repeat Botox injections.  -Patient benefiting from Botox injections.  Improved ADLs and mobility.  Would benefit from continued Botox injections to capitalize on spasticity control that we have gained thus far.    Okay to continue anticoagulation with Eliquis and antiplatelet with aspirin through the procedure for botulinum toxin injection.  The risks of going off the medication outweigh the risks of doing the procedure on the medication.  I will plan to use 27-gauge needles and ultrasound guidance to avoid all blood vessels during the procedure.  We discussed that while on anticoagulation the patient does have an increased risk of bleeding and hematoma     Botox: left upper extremity  Location Botox Amount in Units   Pec sonya and minor 50 units each muscle (100 total)   Biceps    200 units (4 locations)   Hamstring - semimembranosus 100 units (2 locations)   Hamstring - semitendinosus  100 units (2 locations)   Hamstring - Bicep femoris long head and short head 100 units (2 locations)   Total Units  600 units        The risks benefits and alternatives to this procedure were discussed and the patient wishes to proceed with the procedure. Risks include but are not limited to damage to surrounding structures, infection, bleeding, worsening of pain which can be permanent, weakness which can be permanent. Benefits include pain relief, improved function. Alternatives includes not doing the procedure.       Follow up: 6-8 weeks for repeat Botox    Total time spent was 22 minutes.  Included in this time is the time spent preparing for the visit including record review, my exam and evaluation, counseling and education regarding that which is aforementioned in the  assessment and plan.  Time was spent documenting into patient's electronic health record.  Time was spent ordering the appropriate labs, tests, procedures, referrals, medications.  Including this time was also the time spent in care coordination. Included this time as the time spent obtaining history from someone other than the patient.  Some of the time included occurred outside of charting time.  Discussion involved the patient and wife.      Please note that this dictation was created using voice recognition software. I have made every reasonable attempt to correct obvious errors but there may be errors of grammar and content that I may have overlooked prior to finalization of this note.    Dr. Lorie Huff DO, MS  Department of Physical Medicine & Rehabilitation  Neuro Rehabilitation Clinic  [facility]

## 2023-03-27 NOTE — Clinical Note
Lidia - schedule week of May 22 for BOTOX - long appointment Gurinder Heredia I placed new referral

## 2023-04-05 ENCOUNTER — HOSPITAL ENCOUNTER (OUTPATIENT)
Dept: LAB | Facility: MEDICAL CENTER | Age: 78
End: 2023-04-05
Attending: INTERNAL MEDICINE
Payer: MEDICARE

## 2023-04-05 DIAGNOSIS — N18.2 CKD (CHRONIC KIDNEY DISEASE), STAGE II: ICD-10-CM

## 2023-04-05 DIAGNOSIS — D64.9 ANEMIA, UNSPECIFIED TYPE: ICD-10-CM

## 2023-04-05 LAB
ERYTHROCYTE [DISTWIDTH] IN BLOOD BY AUTOMATED COUNT: 50.4 FL (ref 35.9–50)
HCT VFR BLD AUTO: 39.8 % (ref 42–52)
HGB BLD-MCNC: 12.7 G/DL (ref 14–18)
MCH RBC QN AUTO: 28.4 PG (ref 27–33)
MCHC RBC AUTO-ENTMCNC: 31.9 G/DL (ref 33.7–35.3)
MCV RBC AUTO: 89 FL (ref 81.4–97.8)
PLATELET # BLD AUTO: 295 K/UL (ref 164–446)
PMV BLD AUTO: 10.6 FL (ref 9–12.9)
RBC # BLD AUTO: 4.47 M/UL (ref 4.7–6.1)
WBC # BLD AUTO: 10.4 K/UL (ref 4.8–10.8)

## 2023-04-05 PROCEDURE — 36415 COLL VENOUS BLD VENIPUNCTURE: CPT

## 2023-04-05 PROCEDURE — 82306 VITAMIN D 25 HYDROXY: CPT

## 2023-04-05 PROCEDURE — 85027 COMPLETE CBC AUTOMATED: CPT

## 2023-04-05 PROCEDURE — 83970 ASSAY OF PARATHORMONE: CPT

## 2023-04-05 PROCEDURE — 84550 ASSAY OF BLOOD/URIC ACID: CPT

## 2023-04-05 PROCEDURE — 80048 BASIC METABOLIC PNL TOTAL CA: CPT

## 2023-04-06 LAB
25(OH)D3 SERPL-MCNC: 40 NG/ML (ref 30–100)
ANION GAP SERPL CALC-SCNC: 12 MMOL/L (ref 7–16)
BUN SERPL-MCNC: 35 MG/DL (ref 8–22)
CALCIUM SERPL-MCNC: 8.8 MG/DL (ref 8.5–10.5)
CHLORIDE SERPL-SCNC: 105 MMOL/L (ref 96–112)
CO2 SERPL-SCNC: 24 MMOL/L (ref 20–33)
CREAT SERPL-MCNC: 1.31 MG/DL (ref 0.5–1.4)
GFR SERPLBLD CREATININE-BSD FMLA CKD-EPI: 56 ML/MIN/1.73 M 2
GLUCOSE SERPL-MCNC: 207 MG/DL (ref 65–99)
POTASSIUM SERPL-SCNC: 4.5 MMOL/L (ref 3.6–5.5)
PTH-INTACT SERPL-MCNC: 22.4 PG/ML (ref 14–72)
SODIUM SERPL-SCNC: 141 MMOL/L (ref 135–145)
URATE SERPL-MCNC: 3.4 MG/DL (ref 2.5–8.3)

## 2023-04-17 ENCOUNTER — PATIENT OUTREACH (OUTPATIENT)
Dept: HEALTH INFORMATION MANAGEMENT | Facility: OTHER | Age: 78
End: 2023-04-17
Payer: MEDICARE

## 2023-04-17 DIAGNOSIS — N18.32 TYPE 2 DIABETES MELLITUS WITH STAGE 3B CHRONIC KIDNEY DISEASE, WITH LONG-TERM CURRENT USE OF INSULIN (HCC): ICD-10-CM

## 2023-04-17 DIAGNOSIS — Z79.4 TYPE 2 DIABETES MELLITUS WITH STAGE 3B CHRONIC KIDNEY DISEASE, WITH LONG-TERM CURRENT USE OF INSULIN (HCC): ICD-10-CM

## 2023-04-17 DIAGNOSIS — I25.10 CORONARY ARTERY DISEASE INVOLVING NATIVE CORONARY ARTERY OF NATIVE HEART WITHOUT ANGINA PECTORIS: ICD-10-CM

## 2023-04-17 DIAGNOSIS — E11.22 TYPE 2 DIABETES MELLITUS WITH STAGE 3B CHRONIC KIDNEY DISEASE, WITH LONG-TERM CURRENT USE OF INSULIN (HCC): ICD-10-CM

## 2023-04-17 PROCEDURE — 99999 PR NO CHARGE: CPT | Performed by: INTERNAL MEDICINE

## 2023-04-17 NOTE — PROGRESS NOTES
Nurse CM outreach call for monthly CCM assessment. Spouse answered telephone.       Assessment:  Spouse reports pt has been doing okay. Reports no current problems. States blood sugars have been in a good range. 4/15 at bedtime blood sugar 121, (2 units of Toujeo) 4/16 am: 74, 4/16 bedtime: 132, 4/17 am 136. Spouse states pt doesn't have symptoms of low when blood sugar in the 70 range. Spouse adjusting Toujeo 2-4 units based on blood sugar readings. Last A1C 12/22/22 was 6.5.  Spouse also has orange juice available to treat low readings. Spouse reports pt also getting Humalog insulin 5 units at mealtime if blood sugar above 100. Spouse reports insurance changed formulary and will no longer be covering humalog. Spouse will check with pharmacy to see what is covered and will discuss with PCP at next visit.      Education: Reviewed having bedtime snack if blood sugar reading low or less than 120. Reviewed hypoglycemia and appropriate treatment. Spouse does have orange juice available to treat lows.    Care Plan: reviewed    Progress: Continue to work on goals.     Next Outreach:

## 2023-04-19 ENCOUNTER — TELEMEDICINE (OUTPATIENT)
Dept: NEPHROLOGY | Facility: MEDICAL CENTER | Age: 78
End: 2023-04-19
Payer: MEDICARE

## 2023-04-19 VITALS
WEIGHT: 177 LBS | BODY MASS INDEX: 23.98 KG/M2 | OXYGEN SATURATION: 97 % | TEMPERATURE: 97.3 F | HEIGHT: 72 IN | HEART RATE: 76 BPM | SYSTOLIC BLOOD PRESSURE: 152 MMHG | DIASTOLIC BLOOD PRESSURE: 88 MMHG

## 2023-04-19 DIAGNOSIS — E55.9 VITAMIN D DEFICIENCY: ICD-10-CM

## 2023-04-19 DIAGNOSIS — R33.9 URINARY RETENTION: ICD-10-CM

## 2023-04-19 DIAGNOSIS — I10 ESSENTIAL HYPERTENSION: ICD-10-CM

## 2023-04-19 DIAGNOSIS — N18.31 STAGE 3A CHRONIC KIDNEY DISEASE: ICD-10-CM

## 2023-04-19 DIAGNOSIS — R80.9 MICROALBUMINURIA DUE TO TYPE 2 DIABETES MELLITUS (HCC): ICD-10-CM

## 2023-04-19 DIAGNOSIS — E79.0 ELEVATED BLOOD URIC ACID LEVEL: ICD-10-CM

## 2023-04-19 DIAGNOSIS — E11.29 MICROALBUMINURIA DUE TO TYPE 2 DIABETES MELLITUS (HCC): ICD-10-CM

## 2023-04-19 DIAGNOSIS — D64.9 ANEMIA, UNSPECIFIED TYPE: ICD-10-CM

## 2023-04-19 PROCEDURE — 99214 OFFICE O/P EST MOD 30 MIN: CPT | Mod: 95 | Performed by: INTERNAL MEDICINE

## 2023-04-19 ASSESSMENT — ENCOUNTER SYMPTOMS
SHORTNESS OF BREATH: 0
WEIGHT LOSS: 0
SINUS PAIN: 0
ORTHOPNEA: 0
CHILLS: 0
VOMITING: 0
FEVER: 0
PALPITATIONS: 0
NAUSEA: 0
WHEEZING: 0
FLANK PAIN: 0
COUGH: 0
EYES NEGATIVE: 1
HEMOPTYSIS: 0

## 2023-04-19 ASSESSMENT — FIBROSIS 4 INDEX: FIB4 SCORE: 1.87

## 2023-04-19 NOTE — PATIENT INSTRUCTIONS
Continue current treatment  Keep well hydrated  Monitor BP and call clinic > 135/85  Low salt diet  F/u with Urology

## 2023-04-19 NOTE — PROGRESS NOTES
Telemedicine: Established Patient   This evaluation was conducted via Zoom using secure and encrypted videoconferencing technology. The patient was in their home in the [facility].    The patient's identity was confirmed and verbal consent was obtained for this virtual visit.    Subjective:   CC:   Chief Complaint   Patient presents with    Follow-Up     Stage 3a ckd       Av Bob is a 78 y.o. male presenting for evaluation and management of:CKD III, urinary retention, hydronephrosis, recurrent UTI, s/p TURP, urinary incontinence -Urology following  Doing well, no complaints.  HTN: slightly elevated BP -usually well controlled - to monitor  Vit D and PTH well controlled -to monitor  Anemia: Hb level improved - stable  CKD III a creat slightly worse 1.1-1.3 -to increase fluid intake        Review of Systems   Constitutional:  Negative for chills, fever, malaise/fatigue and weight loss.   HENT:  Negative for congestion, hearing loss and sinus pain.    Eyes: Negative.    Respiratory:  Negative for cough, hemoptysis, shortness of breath and wheezing.    Cardiovascular:  Negative for chest pain, palpitations, orthopnea and leg swelling.   Gastrointestinal:  Negative for nausea and vomiting.   Genitourinary:  Positive for frequency. Negative for dysuria, flank pain, hematuria and urgency.        Urinary incontinence   Skin: Negative.    All other systems reviewed and are negative.      Allergies   Allergen Reactions    Diphenhydramine Anxiety     Pt is able to tolerate  Mg benadryl with less anxiety    Lorazepam Unspecified     Disorientation    Spironolactone Unspecified     Acute kidney injury    Ciprofloxacin Rash     Rash,stomach ache       Current medicines (including changes today)  Current Outpatient Medications   Medication Sig Dispense Refill    BOTOX 200 units Recon Soln injection       onabotulinum toxin A (BOTOX) 200 units Recon Soln injection Botox 200 unit injection      glucose blood  (ONETOUCH ULTRA) strip 1 Strip by Other route 3 times a day. 300 Strip 3    fluoxetine (PROZAC) 40 MG capsule Take 1 Capsule by mouth every day. 90 Capsule 3    amLODIPine (NORVASC) 10 MG Tab Take 1 Tablet by mouth every day. 90 Tablet 3    fenofibrate (TRICOR) 145 MG Tab Take 1 Tablet by mouth every day. 100 Tablet 3    atorvastatin (LIPITOR) 80 MG tablet Take 1 Tablet by mouth every evening. 100 Tablet 3    carvedilol (COREG) 3.125 MG Tab Take 1 Tablet by mouth 2 times a day with meals. 200 Tablet 3    hydrALAZINE (APRESOLINE) 10 MG Tab Take 1 Tablet by mouth 3 times a day as needed (SBP >160). 100 Tablet 3    clopidogrel (PLAVIX) 75 MG Tab Take 1 Tablet by mouth every day. 90 Tablet 3    gabapentin (NEURONTIN) 100 MG Cap Take 1 Capsule by mouth 3 times a day as needed (pain). 90 Capsule 5    losartan (COZAAR) 50 MG Tab Take 1 Tablet by mouth 2 times a day. 180 Tablet 2    allopurinol (ZYLOPRIM) 100 MG Tab Take 1 Tablet by mouth every evening. 100 Tablet 3    hydrocortisone (ANUSOL-HC) 25 MG Suppos Insert 1 Suppository into the rectum every 12 hours. 12 Suppository 1    lidocaine 2 % Gel Apply 1 Application topically as needed (rectal pain). 30 mL 1    Insulin Pen Needle (PEN NEEDLES) 32G X 4 MM Misc 1 Each 5 Times a Day. USE FOR INSULIN SHOTS FIVE TIMES DAILY 500 Each 3    Blood Glucose Test Strips 1 Strip 2 times a day. One Touch Ultra 200 Strip 3    finasteride (PROSCAR) 5 MG Tab Take 5 mg by mouth every evening.      Non Formulary Request Take 250 mg by mouth 2 times a day. Floastor (Probiotic)      Insulin Glargine, 2 Unit Dial, (TOUJEO MAX SOLOSTAR) 300 UNIT/ML Solution Pen-injector Inject 2 Units under the skin at bedtime.      traMADol (ULTRAM) 50 MG Tab Take 50 mg by mouth every 6 hours as needed for Severe Pain.      Multiple Vitamin (MULTIVITAMIN PO) Take 1 Tablet by mouth every morning.      Cholecalciferol (VITAMIN D) 2000 UNIT Tab Take 2,000 Units by mouth every morning.      Potassium Chloride CR  (MICRO-K) 8 MEQ Cap CR capsule Take 1 Capsule by mouth every 48 hours. 45 Capsule 3    insulin lispro (HUMALOG,ADMELOG) 100 UNIT/ML Solution Pen-injector injection PEN Inject 5 Units under the skin 3 times a day before meals. Humalog 5 units for sugars between 101-150, increase by 2 units if greater than 150.  Correction dosage is 2:50 greater than 150. 9 mL 3    magnesium oxide (MAG-OX) 400 MG Tab tablet Take 800 mg by mouth 2 times a day.      omeprazole (PRILOSEC) 20 MG delayed-release capsule Take 1 Capsule by mouth every day. 30 Capsule 2    acetaminophen (TYLENOL) 325 MG Tab Take 2 Tablets by mouth every 6 hours as needed for Mild Pain, Moderate Pain or Fever. 30 Tablet 0     No current facility-administered medications for this visit.       Patient Active Problem List    Diagnosis Date Noted    Dyschezia 09/22/2022    Hospital discharge follow-up 06/27/2022    Contracture of muscle of left upper arm- s/p serial casting, severe left elbow pain 06/27/2022    Acute deep vein thrombosis (DVT) of RLE and partial DVT of LLE 06/19/2022    Gout 06/07/2022    Vitamin D insufficiency 06/01/2022    Pressure injury of buttock, stage 1 02/17/2022    Right posterior hip pain and right leg cramping 09/09/2020    ASHLEY (obstructive sleep apnea) 08/27/2020    Nocturnal hypoxemia 07/06/2020    Chronic fatigue 06/09/2020    Essential hypertension, benign 05/06/2020    Polypharmacy 02/04/2020    Benign prostatic hyperplasia with urinary obstruction 01/29/2020    Altered mobility due to old stroke 01/22/2020    Neurogenic bladder 11/19/2019    PVD (peripheral vascular disease) ([facility]) 10/08/2019    GERD with esophagitis 10/19/2018    ([facility]) Left hemiparesis 12/27/2017    ([facility]) Diabetic nephropathy associated with type 2 diabetes mellitus 11/30/2017    Psychophysiological insomnia 11/21/2017    ([facility]) Moderate episode of recurrent major depressive disorder 11/07/2017    Wheelchair dependent 11/07/2017    Hypomagnesemia 08/12/2017     Paroxysmal atrial fibrillation ([facility]) 05/23/2017    Iron deficiency anemia 05/20/2017    H/O non-Hodgkin's lymphoma 05/08/2017    ([facility]) Hypertension associated with diabetes 03/22/2017    Type 2 diabetes mellitus with stage 3b chronic kidney disease, with long-term current use of insulin ([facility]) 12/30/2016    H/O Cerebrovascular accident (CVA) in adulthood 12/30/2016    Coronary artery disease involving native coronary artery 12/30/2016    Idiopathic chronic gout of multiple sites without tophus 01/28/2014    Presence of BMS and drug coated stents in LAD coronary artery 12/13/2011    Presence of drug coated stents in left circumflex coronary artery 12/13/2011    Status post percutaneous transluminal coronary angioplasty 12/13/2011    ([facility]) Hyperlipidemia associated with type 2 diabetes mellitus 12/13/2011       Family History   Problem Relation Age of Onset    Heart Disease Father         CAD    Diabetes Father     Cancer Mother     Psychiatric Illness Mother         Depression    Depression Mother     Kidney stones Brother     Heart Disease Brother     Psychiatric Illness Brother         Depression    Depression Brother     Suicide Attempts Other     Psychiatric Illness Other         autism       He  has a past medical history of ([facility]) Chronic ulcer of right lower extremity with fat layer exposed (12/27/2018), Acute cystitis without hematuria (6/30/2019), Acute on chronic renal failure ([facility]) (1/21/2020), Acute prostatitis (1/13/2022), Acute respiratory failure with hypoxia ([facility]) (5/20/2017), CAD (coronary artery disease), Cancer ([facility]), Cataract, Cerebrovascular accident (CVA) ([facility]) (12/30/2016), Chickenpox, CKD (chronic kidney disease) stage 3, GFR 30-59 ml/min ([facility]), Controlled gout (2014), Coronary atherosclerosis of native coronary artery, Daytime sleepiness, Depression, Diabetes ([facility]), Diarrhea (7/2/2019), Difficulty swallowing, Dysphagia (3/15/2021), Enterococcal septicemia ([facility]) (8/12/2017), Exposure to  SARS-associated coronavirus (10/13/2021), Roberto hematuria (1/29/2020), Frequent urination, GERD (gastroesophageal reflux disease), Thai measles, History of renal calculi (11/19/2019), Hordeolum externum of left lower eyelid (9/14/2021), Hydronephrosis (1/20/2020), Hypertension (06/30/2021), Hypokalemia (2012), Hypomagnesemia (08/12/2017), Influenza A (1/26/2020), Insomnia, Kidney stone, Left hand weakness (5/20/2019), Leg edema, right (1/22/2020), Lymphoma ([facility]) (2/19/2017), Mixed hyperlipidemia, Muscle strain of right gluteal region (5/20/2019), Nephrolithiasis (2006), Normocytic hypochromic anemia (5/20/2017), NSTEMI (non-ST elevated myocardial infarction) ([facility]) (07/18/2017), Pain (06/30/2021), Peripheral vascular disease ([facility]), Polyneuropathy in diabetes(357.2) (9/11/2013), Pyelonephritis (5/24/2022), Renal cyst (1/29/2020), Renal mass (1/21/2020), Septic shock ([facility]) (5/20/2017), Skin ulcer of calf ([facility]) (2015), Stage 3b chronic kidney disease ([facility]) (8/25/2016), Stroke ([facility]) (2016), Toenail avulsion, initial encounter- left foot 3rd digit (7/12/2021), Urinary bladder disorder, Urinary incontinence, UTI (urinary tract infection) (5/28/2022), Weakness, and Wound of left leg (2012).    He has no past medical history of Suicide attempt ([facility]).  He  has a past surgical history that includes lithotripsy; angioplasty (1997); wound closure general (4/3/2012); tonsillectomy and adenoidectomy; cataract extraction with iol; solo by laparoscopy (1998); cystoscopy stent placement (Left, 2/12/2018); ureteroscopy (Left, 2/12/2018); lasertripsy (Left, 2/12/2018); [facility] cardiac cath (1997); [facility] cardiac cath (2009); tonsillectomy; lumbar transforaminal epidural steroid injection (Right, 11/2/2020); lumbar transforaminal epidural steroid injection (Right, 3/29/2021); pr upper gi endoscopy,diagnosis (N/A, 7/21/2021); pr upper gi endoscopy,biopsy (N/A, 7/21/2021); and pr inj lumbar/sacral,w/ imaging (7/27/2021).       Objective:   BP  (!) 152/88 (BP Location: Right arm, Patient Position: Sitting, BP Cuff Size: Adult)   Pulse 76   Temp 36.3 °C (97.3 °F)   Ht 1.829 m (6')   Wt 80.3 kg (177 lb)   SpO2 97%   BMI 24.01 kg/m²     Physical Exam  Vitals reviewed.   Constitutional:       Appearance: Normal appearance.   Pulmonary:      Effort: Pulmonary effort is normal. No respiratory distress.   Musculoskeletal:      Cervical back: Normal range of motion and neck supple.   Neurological:      Mental Status: He is alert and oriented to person, place, and time.   Psychiatric:         Mood and Affect: Mood normal.         Behavior: Behavior normal.         Thought Content: Thought content normal.         Judgment: Judgment normal.       Assessment and Plan:   The following treatment plan was discussed:     1. Stage 3a chronic kidney disease (HCC)  - URINALYSIS,CULTURE IF INDICATED; Future  - Basic Metabolic Panel; Future    2. Essential hypertension  - Basic Metabolic Panel; Future    3. Microalbuminuria due to type 2 diabetes mellitus (HCC)    4. Vitamin D deficiency    5. Anemia, unspecified type  - CBC WITHOUT DIFFERENTIAL; Future    6. Urinary retention  - URINALYSIS,CULTURE IF INDICATED; Future    7. Elevated blood uric acid level    Recs;  Continue current treatment  Keep well hydrated  Monitor BP and call clinic > 135/85  Low salt diet  F/u with Urology    Follow-up: Return in about 6 months (around 10/19/2023).

## 2023-04-24 ENCOUNTER — OFFICE VISIT (OUTPATIENT)
Dept: MEDICAL GROUP | Facility: PHYSICIAN GROUP | Age: 78
End: 2023-04-24
Payer: MEDICARE

## 2023-04-24 VITALS
WEIGHT: 180.5 LBS | RESPIRATION RATE: 16 BRPM | OXYGEN SATURATION: 97 % | TEMPERATURE: 96.4 F | HEART RATE: 87 BPM | DIASTOLIC BLOOD PRESSURE: 58 MMHG | BODY MASS INDEX: 24.48 KG/M2 | SYSTOLIC BLOOD PRESSURE: 112 MMHG

## 2023-04-24 DIAGNOSIS — Z79.4 TYPE 2 DIABETES MELLITUS WITH STAGE 3B CHRONIC KIDNEY DISEASE, WITH LONG-TERM CURRENT USE OF INSULIN (HCC): ICD-10-CM

## 2023-04-24 DIAGNOSIS — R26.9 ALTERED MOBILITY DUE TO OLD STROKE: ICD-10-CM

## 2023-04-24 DIAGNOSIS — E78.5 HYPERLIPIDEMIA ASSOCIATED WITH TYPE 2 DIABETES MELLITUS (HCC): ICD-10-CM

## 2023-04-24 DIAGNOSIS — Z99.3 WHEELCHAIR DEPENDENCE: ICD-10-CM

## 2023-04-24 DIAGNOSIS — G81.94 LEFT HEMIPARESIS (HCC): ICD-10-CM

## 2023-04-24 DIAGNOSIS — F33.1 MODERATE EPISODE OF RECURRENT MAJOR DEPRESSIVE DISORDER (HCC): ICD-10-CM

## 2023-04-24 DIAGNOSIS — N18.32 TYPE 2 DIABETES MELLITUS WITH STAGE 3B CHRONIC KIDNEY DISEASE, WITH LONG-TERM CURRENT USE OF INSULIN (HCC): ICD-10-CM

## 2023-04-24 DIAGNOSIS — E11.22 TYPE 2 DIABETES MELLITUS WITH STAGE 3B CHRONIC KIDNEY DISEASE, WITH LONG-TERM CURRENT USE OF INSULIN (HCC): ICD-10-CM

## 2023-04-24 DIAGNOSIS — I69.398 ALTERED MOBILITY DUE TO OLD STROKE: ICD-10-CM

## 2023-04-24 DIAGNOSIS — E11.69 HYPERLIPIDEMIA ASSOCIATED WITH TYPE 2 DIABETES MELLITUS (HCC): ICD-10-CM

## 2023-04-24 DIAGNOSIS — I73.9 PVD (PERIPHERAL VASCULAR DISEASE) (HCC): ICD-10-CM

## 2023-04-24 PROBLEM — E11.21 DIABETIC NEPHROPATHY ASSOCIATED WITH TYPE 2 DIABETES MELLITUS (HCC): Status: RESOLVED | Noted: 2017-11-30 | Resolved: 2023-04-24

## 2023-04-24 LAB
HBA1C MFR BLD: 6.8 % (ref ?–5.8)
POCT INT CON NEG: NEGATIVE
POCT INT CON POS: POSITIVE

## 2023-04-24 PROCEDURE — 83036 HEMOGLOBIN GLYCOSYLATED A1C: CPT | Performed by: INTERNAL MEDICINE

## 2023-04-24 PROCEDURE — 99214 OFFICE O/P EST MOD 30 MIN: CPT | Performed by: INTERNAL MEDICINE

## 2023-04-24 RX ORDER — INSULIN LISPRO 100 [IU]/ML
INJECTION, SOLUTION INTRAVENOUS; SUBCUTANEOUS
Qty: 9 ML | Refills: 3 | Status: SHIPPED | OUTPATIENT
Start: 2023-04-24 | End: 2024-03-03

## 2023-04-24 ASSESSMENT — FIBROSIS 4 INDEX: FIB4 SCORE: 1.87

## 2023-04-24 ASSESSMENT — PATIENT HEALTH QUESTIONNAIRE - PHQ9: CLINICAL INTERPRETATION OF PHQ2 SCORE: 0

## 2023-04-25 NOTE — ASSESSMENT & PLAN NOTE
Chronic and ongoing, previously could use his walker to take a few steps or help to stand/pivot with transfers, now dependent with 2 assist or in his wheelchair causing him to be completely home bound. Will trial PT to supplement private pay exercise to try and strengthen lower extremities to improve mobility.

## 2023-04-25 NOTE — ASSESSMENT & PLAN NOTE
Chronic ongoing program, receiving Botox from Dr. Huff of [facility] to help with contractures, will order home health PT to work on strengthening with goal to get back to taking a few steps with walker and improving transfers in/out of their vehicle so he can leave the house outside of medical appointments.

## 2023-04-25 NOTE — ASSESSMENT & PLAN NOTE
Chronic ongoing problem, continue follow up with cardiology, update lipids, taking atorvastatin and fenofibrate, continue at this time.

## 2023-04-25 NOTE — ASSESSMENT & PLAN NOTE
Chronic ongoing problem, worsened due to decreased mobility following several medical issues last year. Continue fluoxetine 40 mg daily.

## 2023-04-25 NOTE — PROGRESS NOTES
Subjective:   Chief Complaint/History of Present Illness:  Av Bob is a 78 y.o. male established patient who presents today to discuss medical problems as listed below. Av is accompanied by his wife Usha and son Guzman.    Problem   Altered Mobility Due to Old Stroke    Prior stroke led to left hemiparesis. Previously could walk short distance with cane/walker and would use the wheelchair for longer distances. Since medical decompensation last year he has not been able to regain his strength in the lower extremities.      Pvd (Peripheral Vascular Disease) ([facility])    History of PVD and prior DVT when he was ill. No longer on DOAC. He takes Plavix due to prior CVA.     ([facility]) Left hemiparesis    Acute 12/2016 at [facility].  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  Symptoms originally improved significantly with chemotherapy and he was walking well, and then hemiparesis worsened significantly after sepsis from UTI and norovirus infection mid 2017.    Previously could walk short distance with a cane or walker until medical decompensation in 2022, has not been able to regain strength in his legs since that time.      ([facility]) Moderate episode of recurrent major depressive disorder    He was noted to be depressed after his major medical events in early 2017.  A few years ago his initiated on Prozac and current dose is 40 mg daily.  His wife notes that he is always been emotional and will cry easily at [employer] movies or commercials.  She notes that he still does continue to cry sporadically but it resolves quickly.  No inappropriate laughing or other indication of pseudobulbar affect.      Previously reduced dose from 40 mg to 20 mg but symptoms worsened so went back up.    Current regimen: fluoxetine 40 mg daily     Wheelchair dependent    He has been using a wheelchair exclusively for mobility. He had wheelchair fitting completed  in 9/2020 and was provided a wheelchair through [employer].  He has a history of left hemiplegia after a stroke and this worsened after he was critically ill (NHL on chemo, norovirus in ICU and critically ill). No longer able to take steps or use his walker, hoping to get back to some mobility with PT.     Type 2 Diabetes Mellitus With Stage 3b Chronic Kidney Disease, With Long-Term Current Use of Insulin (Hcc)     Latest Reference Range & Units 04/24/23 04:00   Glycohemoglobin 5.8 % 6.8 !     Longstanding history of type 2 diabetes on insulin.  Previously followed with endocrinology, Dr. Rasheed.     Current regimen includes Toujeo 0-5 units daily, Humalog 5 units for sugars between 101-150, increase by 2 units if greater than 150.  Correction dosage is 2: 50 greater than 150.    Holding Trulicity since most recent hospitalization in June 2022, have not resumed.     Complicated by retinal involvement, neuropathy, CKDIII-IV, and microalbuminuria.     (HCC) Hyperlipidemia associated with type 2 diabetes mellitus     Latest Reference Range & Units 04/29/22 11:18   Cholesterol,Tot 100 - 199 mg/dL 129   Triglycerides 0 - 149 mg/dL 99   HDL >=40 mg/dL 29 !   LDL <100 mg/dL 80     History of stroke, CAD, DM2, and  hyperlipidemia. Currently on high dose atorvastatin 80 mg daily and fenofibrate 145 mg daily. Follows with cardiology, Dr. Tucker. Interested in considering adding PCSK9 inhibitor therapy in place of statin/fibrate as LDL not at goal.     (HCC) Diabetic nephropathy associated with type 2 diabetes mellitus (Resolved)        Current Medications:  Current Outpatient Medications Ordered in Epic   Medication Sig Dispense Refill    HUMALOG KWIKPEN 100 UNIT/ML Solution Pen-injector injection PEN Inject 5 Units under the skin 3 times a day before meals. Humalog 5 units for sugars between 101-150, increase by 2 units if greater than 150.  Correction dosage is 2:50 greater than 150 9 mL 3    BOTOX 200 units Recon Soln  injection       onabotulinum toxin A (BOTOX) 200 units Recon Soln injection Botox 200 unit injection      glucose blood (ONETOUCH ULTRA) strip 1 Strip by Other route 3 times a day. 300 Strip 3    fluoxetine (PROZAC) 40 MG capsule Take 1 Capsule by mouth every day. 90 Capsule 3    amLODIPine (NORVASC) 10 MG Tab Take 1 Tablet by mouth every day. 90 Tablet 3    fenofibrate (TRICOR) 145 MG Tab Take 1 Tablet by mouth every day. 100 Tablet 3    atorvastatin (LIPITOR) 80 MG tablet Take 1 Tablet by mouth every evening. 100 Tablet 3    carvedilol (COREG) 3.125 MG Tab Take 1 Tablet by mouth 2 times a day with meals. 200 Tablet 3    hydrALAZINE (APRESOLINE) 10 MG Tab Take 1 Tablet by mouth 3 times a day as needed (SBP >160). 100 Tablet 3    clopidogrel (PLAVIX) 75 MG Tab Take 1 Tablet by mouth every day. 90 Tablet 3    gabapentin (NEURONTIN) 100 MG Cap Take 1 Capsule by mouth 3 times a day as needed (pain). 90 Capsule 5    losartan (COZAAR) 50 MG Tab Take 1 Tablet by mouth 2 times a day. 180 Tablet 2    allopurinol (ZYLOPRIM) 100 MG Tab Take 1 Tablet by mouth every evening. 100 Tablet 3    hydrocortisone (ANUSOL-HC) 25 MG Suppos Insert 1 Suppository into the rectum every 12 hours. 12 Suppository 1    lidocaine 2 % Gel Apply 1 Application topically as needed (rectal pain). 30 mL 1    Insulin Pen Needle (PEN NEEDLES) 32G X 4 MM Misc 1 Each 5 Times a Day. USE FOR INSULIN SHOTS FIVE TIMES DAILY 500 Each 3    Blood Glucose Test Strips 1 Strip 2 times a day. One Touch Ultra 200 Strip 3    finasteride (PROSCAR) 5 MG Tab Take 5 mg by mouth every evening.      Non Formulary Request Take 250 mg by mouth 2 times a day. Floastor (Probiotic)      Insulin Glargine, 2 Unit Dial, (TOUJEO MAX SOLOSTAR) 300 UNIT/ML Solution Pen-injector Inject 2 Units under the skin at bedtime.      traMADol (ULTRAM) 50 MG Tab Take 50 mg by mouth every 6 hours as needed for Severe Pain.      Multiple Vitamin (MULTIVITAMIN PO) Take 1 Tablet by mouth every  morning.      Cholecalciferol (VITAMIN D) 2000 UNIT Tab Take 2,000 Units by mouth every morning.      Potassium Chloride CR (MICRO-K) 8 MEQ Cap CR capsule Take 1 Capsule by mouth every 48 hours. 45 Capsule 3    magnesium oxide (MAG-OX) 400 MG Tab tablet Take 800 mg by mouth 2 times a day.      omeprazole (PRILOSEC) 20 MG delayed-release capsule Take 1 Capsule by mouth every day. 30 Capsule 2    acetaminophen (TYLENOL) 325 MG Tab Take 2 Tablets by mouth every 6 hours as needed for Mild Pain, Moderate Pain or Fever. 30 Tablet 0     No current Epic-ordered facility-administered medications on file.          Objective:   Physical Exam:    Vitals: /58 (BP Location: Right arm, Patient Position: Sitting, BP Cuff Size: Adult)   Pulse 87   Temp (!) 35.8 °C (96.4 °F) (Temporal)   Resp 16   Wt 81.9 kg (180 lb 8 oz)   SpO2 97%    BMI: Body mass index is 25.9 kg/m² (pended).  Physical Exam  Constitutional:       General: He is not in acute distress.     Appearance: He is not toxic-appearing.   Eyes:      Conjunctiva/sclera: Conjunctivae normal.   Cardiovascular:      Rate and Rhythm: Normal rate and regular rhythm.   Pulmonary:      Effort: Pulmonary effort is normal. No respiratory distress.      Breath sounds: No wheezing or rhonchi.   Abdominal:      General: Bowel sounds are normal.      Palpations: Abdomen is soft.      Tenderness: There is no abdominal tenderness.   Musculoskeletal:      Right lower leg: No edema.      Left lower leg: No edema.      Comments: Diabetic Foot Exam: No ulcers/laceration/blisters present on bilateral feet, normal gross anatomy of bilateral feet without abnormal curvature or arch, no toe deformities, no toenail thickness, no ingrown toenails, no increase in skin temperature bilaterally, no skin erythema to bilateral feet, bilateral dorsalis pedis 1+ and equal, Refill less than 2 seconds bilaterally, Dede 10 g monofilament testing diminished in bilateral great toes, bilateral balls  of feet at medial/lateral/mid ball of foot      Skin:     General: Skin is warm and dry.      Findings: No rash.   Neurological:      Gait: Gait abnormal.      Comments: Left hemiparesis   Psychiatric:         Behavior: Behavior normal.         Thought Content: Thought content normal.         Judgment: Judgment normal.      Comments: Alert, answers questions appropriately, depressed regarding mobility issues             Assessment and Plan:   Av is a 78 y.o. male with the following:  Problem List Items Addressed This Visit       ([facility]) Hyperlipidemia associated with type 2 diabetes mellitus     Chronic ongoing problem, continue follow up with cardiology, update lipids, taking atorvastatin and fenofibrate, continue at this time.         Relevant Medications    HUMALOG KWIKPEN 100 UNIT/ML Solution Pen-injector injection PEN    Other Relevant Orders    Lipid Profile    ([facility]) Left hemiparesis     Chronic ongoing program, receiving Botox from Dr. Huff of [facility] to help with contractures, will order home health PT to work on strengthening with goal to get back to taking a few steps with walker and improving transfers in/out of their vehicle so he can leave the house outside of medical appointments.         Relevant Orders    Referral to Home Health    ([facility]) Moderate episode of recurrent major depressive disorder     Chronic ongoing problem, worsened due to decreased mobility following several medical issues last year. Continue fluoxetine 40 mg daily.         Relevant Orders    Referral to Home Health    Altered mobility due to old stroke     Chronic ongoing problem, previously could take some steps with his cane/walker and now limited to the wheelchair, cannot get out of his house without significant assistance. Will order home health PT to try more intense program for a few weeks now that his medical status has improved and he is receiving Botox from Dr. Huff regularly.         Relevant Orders    Referral to Home  Health    PVD (peripheral vascular disease) ([facility])     Chronic ongoing problem, continue atorvastatin 80 mg daily and Plavix 75 mg daily.         Relevant Orders    Referral to Home Health    Type 2 diabetes mellitus with stage 3b chronic kidney disease, with long-term current use of insulin ([facility])     Chronic ongoing problem.  Diabetic foot exam shows neuropathy but no wound concerns, slightly thickened toenails which are appropriately cared for by his wife.  Continue Toujeo 0 to 5 units daily with Humalog 5-7 units on sliding scale at mealtime.  No longer on GLP-1 due to overtreatment.  He will see his retina specialist next month.  He saw his nephrologist last week who felt he was stable.         Relevant Medications    HUMALOG KWIKPEN 100 UNIT/ML Solution Pen-injector injection PEN    Other Relevant Orders    Microalbumin Creat Ratio Urine (Lab Collect)    Diabetic Monofilament LE Exam (Completed)    POCT A1C (Completed)    Referral to Home Health    Wheelchair dependent     Chronic and ongoing, previously could use his walker to take a few steps or help to stand/pivot with transfers, now dependent with 2 assist or in his wheelchair causing him to be completely home bound. Will trial PT to supplement private pay exercise to try and strengthen lower extremities to improve mobility.         Relevant Orders    Referral to Home Health          RTC: Return in about 4 months (around 8/24/2023).    I spent a total of 37 minutes with record review, exam, communication with the patient, communication with other providers, and documentation of this encounter.    PLEASE NOTE: This dictation was created using voice recognition software. I have made every reasonable attempt to correct obvious errors, but I expect that there are errors of grammar and possibly content that I did not discover before finalizing the note.      Madison Bunch, DO  Geriatric and Internal Medicine  [facility]

## 2023-04-25 NOTE — ASSESSMENT & PLAN NOTE
Chronic ongoing problem, previously could take some steps with his cane/walker and now limited to the wheelchair, cannot get out of his house without significant assistance. Will order home health PT to try more intense program for a few weeks now that his medical status has improved and he is receiving Botox from Dr. Huff regularly.

## 2023-04-25 NOTE — ASSESSMENT & PLAN NOTE
Chronic ongoing problem.  Diabetic foot exam shows neuropathy but no wound concerns, slightly thickened toenails which are appropriately cared for by his wife.  Continue Toujeo 0 to 5 units daily with Humalog 5-7 units on sliding scale at mealtime.  No longer on GLP-1 due to overtreatment.  He will see his retina specialist next month.  He saw his nephrologist last week who felt he was stable.

## 2023-04-29 NOTE — PROGRESS NOTES
Chief Complaint   Patient presents with    Coronary Artery Disease     F/V Dx: Coronary artery disease involving native coronary artery of native heart without angina pectoris      Subjective:   Av Bob is a 78 y.o. male who presents today for follow-up.    Patient of Dr. Tucker.  Current medical problems include CAD (stents to RCA, LAD, OM over 10 years ago), PAF, DM, HLD, CVA 2016 with residual L hemiparesis, HTN, CKD.    Last seen by Dr. Tucker November 2022.  He wore a event monitor to assess for bradycardia which showed no significant bradycardia.  He had frequent PVCs.    Av does not have any cardiac complaints today.  He brings in his blood pressure log which we review showing blood pressures ranging from 120-145 systolic, some rare outliers.    We again discussed his elevated LDL.  He is due for another lipid profile this week and will review this. He asks about changing his diet to help with his cholesterol.  He does like to eat cheeseburgers.    He does not complain of any palpitations.    Past Medical History:   Diagnosis Date    ([facility]) Chronic ulcer of right lower extremity with fat layer exposed 12/27/2018    Acute cystitis without hematuria 6/30/2019    Acute on chronic renal failure ([facility]) 1/21/2020    Acute prostatitis 1/13/2022    New problem of prostatitis identified by dispatch health when patient developed hematuria with rectal pain.  He followed up with his urology PA and was placed back on Bactrim.  He was recently on Bactrim and has a history of recurrent urinary tract infections related to neurogenic bladder from prior stroke.  Rectal pain is dissipated however he continues to have hematuria.  He had associated CATA o    Acute respiratory failure with hypoxia ([facility]) 5/20/2017    CAD (coronary artery disease)     GIOVANNA to RCA in '97, GIOVANNA X2 to LAD and GIOVANNA X2 to OM in '09    Cancer ([facility])     2017; chemo lympoma non hodgkins lymphoma    Cataract     dez iol    Cerebrovascular  accident (CVA) ([facility]) 12/30/2016    Left arm weakness  etiology of stroke not established, lymphoma discovered on MRI evaluation of stroke, L hemiparesis much worse after acute infectious illness in mid 2017, but no specific diagnosed recurrent neurological etiology, all at [facility]    Chickenpox     CKD (chronic kidney disease) stage 3, GFR 30-59 ml/min ([facility])     Controlled gout 2014    Coronary atherosclerosis of native coronary artery     S/P PTCA (percutaneous transluminal coronary angioplasty), RCA, 5/1997, patent on cath 7/10/2009 at the time of interventions on his left anterior descending and circumflex coronary arteries    Daytime sleepiness     Depression     Diabetes ([facility])     Diarrhea 7/2/2019    Difficulty swallowing     Dysphagia 3/15/2021    He reports progressive worsening of his swallow function.  He notices at least once per week he is choking and coughing, can be with pills or can be with food.  The episodes are quite frightening and he takes a bit of time for him to recover.  He has a prior history of stroke and recalls working with speech-language pathology at that time but does not remember if he did any formal esophagrams or s    Enterococcal septicemia ([facility]) 8/12/2017    Exposure to SARS-associated coronavirus 10/13/2021    He reports viral illness last week with runny nose, congestion, productive cough, and wheezing.  He went to a play of his grandson and may have been exposed to Covid.  He was feeling very bad last Friday and over the weekend and now is at least 50% better.    Roberto hematuria 1/29/2020    Frequent urination     GERD (gastroesophageal reflux disease)     [place] measles     History of renal calculi 11/19/2019    Hordeolum externum of left lower eyelid 9/14/2021    He reports to clinic with 3 weeks of erythema and pain of the left lower eyelid. No clear inciting cause. Reports no globe pain. Lower > upper eyelid swelling and erythema. After  1.5 weeks had drainage of purulence from the lower medial eyelid. No photosensitivity. Using warm compresses. No changes in visual acuity. 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 Barry    Normocytic hypochromic anemia 5/20/2017    NSTEMI (non-ST elevated myocardial infarction) ([facility]) 07/18/2017    complicating UTI with sepsis    Pain 06/30/2021    right leg foot    Peripheral vascular disease ([facility])     Polyneuropathy in diabetes(357.2) 9/11/2013    Pyelonephritis 5/24/2022    Av had abrupt onset of illness starting on Sunday.  He felt unwell and then had projectile vomiting x3 episodes after dinner that evening.  He had associated nausea but no overt abdominal pain.  He has continued to have anorexia and is having difficulty with his p.o. intake.  He did get in some fruit in a month and yesterday and about 50 to 60 ounces of water.  He has chronic urinary retention    Renal cyst 1/29/2020    Renal mass 1/21/2020    Septic shock ([facility]) 5/20/2017    Skin ulcer of calf ([facility]) 2015    Right, Dr. Terry and wound care    Stage 3b chronic kidney disease ([facility]) 8/25/2016     Latest Reference Range & Units 04/29/22 11:14 Bun 8 - 22 mg/dL 33 (H) Creatinine 0.50 - 1.40 mg/dL 1.55 (H) GFR (CKD-EPI) >60 mL/min/1.73 m 2 46 ! [1]  He has a history of chronic kidney disease related to nephrolithiasis, recurrent UTIs, and BPH as well as history of  hypertension and diabetes.  He is following with nephrology, Dr. Jarvis and urology, Dr. Barry.  Spironolactone was discontinued due     Stroke (HCC) 2016    left sided weakness    Toenail avulsion, initial encounter- left foot 3rd digit 7/12/2021    They report that his toenail spontaneously came off last week.  He does not recall specific injury or any pain.  His significant other saw the nail on the floor with dried blood on his foot.  She cleaned the wound and covered it with gauze.  On follow-up in clinic today the gauze has stuck to the wound bed but with saline was fairly easy to remove the dried gauze.  There was scant amount of bright    Urinary bladder disorder     Urinary incontinence     pt wears pads    UTI (urinary tract infection) 5/28/2022    Weakness     Wound of left leg 2012    Requiring surgery and debridment, Dr. Moore         Past Surgical History:   Procedure Laterality Date    TN INJ LUMBAR/SACRAL,W/ IMAGING  7/27/2021    Procedure: Lumbar L5-S1 interlaminar epidural steroid injection;  Surgeon: Harpal Bennett M.D.;  Location[facility];  Service: Pain Management    TN UPPER GI ENDOSCOPY,DIAGNOSIS N/A 7/21/2021    Procedure: GASTROSCOPY - AND DILATION;  Surgeon: Neville Huerta M.D.;  Location: SURGERY SAME DAY [facility];  Service: Gastroenterology    TN UPPER GI ENDOSCOPY,BIOPSY N/A 7/21/2021    Procedure: GASTROSCOPY, WITH BIOPSY;  Surgeon: Neville Huerta M.D.;  Location: SURGERY SAME DAY [facility];  Service: Gastroenterology    LUMBAR TRANSFORAMINAL EPIDURAL STEROID INJECTION Right 3/29/2021    Procedure: RIGHT L3-4 and L4-5 transforaminal epidural steroid injection with fluoroscopic guidance;  Surgeon: Harpal Bennett M.D.;  Location: [facility];  Service: Pain Management    LUMBAR TRANSFORAMINAL EPIDURAL STEROID INJECTION Right 11/2/2020    Procedure: INJECTION, STEROID, SPINE, LUMBAR, EPIDURAL, TRANSFORAMINAL APPROACH;  Surgeon: Harpal Bennett  M.D.;  Location: [facility];  Service: Pain Management    CYSTOSCOPY STENT PLACEMENT Left 2/12/2018    Procedure: CYSTOSCOPY  ;  Surgeon: Rey Barry M.D.;  Location: SURGERY [facility];  Service: Urology    URETEROSCOPY Left 2/12/2018    Procedure: URETEROSCOPY- FLEXIBLE  ;  Surgeon: Rey Barry M.D.;  Location: SURGERY [facility];  Service: Urology    LASERTRIPSY Left 2/12/2018    Procedure: LASERTRIPSY - LITHO  ;  Surgeon: Rey Barry M.D.;  Location: SURGERY [facility];  Service: Urology    WOUND CLOSURE GENERAL  4/3/2012    Performed by GERHARD GILBERT at SURGERY SAME DAY [facility]    Z CARDIAC CATH  2009    Stents to LAD, Om    DAGOBERTO BY LAPAROSCOPY  1998    ANGIOPLASTY  1997    RCA followed by other stents as noted above.     [facility] CARDIAC CATH  1997    stent RCA    CATARACT EXTRACTION WITH IOL      bilateral    LITHOTRIPSY      TONSILLECTOMY      TONSILLECTOMY AND ADENOIDECTOMY           Family History   Problem Relation Age of Onset    Heart Disease Father         CAD    Diabetes Father     Cancer Mother     Psychiatric Illness Mother         Depression    Depression Mother     Kidney stones Brother     Heart Disease Brother     Psychiatric Illness Brother         Depression    Depression Brother     Suicide Attempts Other     Psychiatric Illness Other         autism         Social History     Tobacco Use   Smoking Status Never   Smokeless Tobacco Never          Social History     Substance and Sexual Activity   Alcohol Use Never         Allergies   Allergen Reactions    Diphenhydramine Anxiety     Pt is able to tolerate  Mg benadryl with less anxiety    Lorazepam Unspecified     Disorientation    Spironolactone Unspecified     Acute kidney injury    Ciprofloxacin Rash     Rash,stomach ache         Outpatient Encounter Medications as of 5/3/2023   Medication Sig Dispense Refill    clopidogrel (PLAVIX) 75 MG Tab Take 1 Tablet by mouth every day. 90 Tablet 3     atorvastatin (LIPITOR) 80 MG tablet Take 1 Tablet by mouth every evening. 100 Tablet 3    HUMALOG KWIKPEN 100 UNIT/ML Solution Pen-injector injection PEN Inject 5 Units under the skin 3 times a day before meals. Humalog 5 units for sugars between 101-150, increase by 2 units if greater than 150.  Correction dosage is 2:50 greater than 150 9 mL 3    BOTOX 200 units Recon Soln injection       glucose blood (ONETOUCH ULTRA) strip 1 Strip by Other route 3 times a day. 300 Strip 3    fluoxetine (PROZAC) 40 MG capsule Take 1 Capsule by mouth every day. 90 Capsule 3    amLODIPine (NORVASC) 10 MG Tab Take 1 Tablet by mouth every day. 90 Tablet 3    fenofibrate (TRICOR) 145 MG Tab Take 1 Tablet by mouth every day. 100 Tablet 3    carvedilol (COREG) 3.125 MG Tab Take 1 Tablet by mouth 2 times a day with meals. 200 Tablet 3    gabapentin (NEURONTIN) 100 MG Cap Take 1 Capsule by mouth 3 times a day as needed (pain). 90 Capsule 5    losartan (COZAAR) 50 MG Tab Take 1 Tablet by mouth 2 times a day. 180 Tablet 2    allopurinol (ZYLOPRIM) 100 MG Tab Take 1 Tablet by mouth every evening. 100 Tablet 3    Insulin Pen Needle (PEN NEEDLES) 32G X 4 MM Misc 1 Each 5 Times a Day. USE FOR INSULIN SHOTS FIVE TIMES DAILY 500 Each 3    finasteride (PROSCAR) 5 MG Tab Take 5 mg by mouth every evening.      Non Formulary Request Take 250 mg by mouth 2 times a day. Floastor (Probiotic)      Insulin Glargine, 2 Unit Dial, (TOUJEO MAX SOLOSTAR) 300 UNIT/ML Solution Pen-injector Inject 2 Units under the skin at bedtime.      Multiple Vitamin (MULTIVITAMIN PO) Take 1 Tablet by mouth every morning.      Cholecalciferol (VITAMIN D) 2000 UNIT Tab Take 2,000 Units by mouth every morning.      Potassium Chloride CR (MICRO-K) 8 MEQ Cap CR capsule Take 1 Capsule by mouth every 48 hours. 45 Capsule 3    magnesium oxide (MAG-OX) 400 MG Tab tablet Take 800 mg by mouth 2 times a day.      omeprazole (PRILOSEC) 20 MG delayed-release capsule Take 1 Capsule by  "mouth every day. 30 Capsule 2    acetaminophen (TYLENOL) 325 MG Tab Take 2 Tablets by mouth every 6 hours as needed for Mild Pain, Moderate Pain or Fever. 30 Tablet 0    [DISCONTINUED] onabotulinum toxin A (BOTOX) 200 units Recon Soln injection Botox 200 unit injection (Patient not taking: Reported on 5/3/2023)      hydrALAZINE (APRESOLINE) 10 MG Tab Take 1 Tablet by mouth 3 times a day as needed (SBP >160). (Patient not taking: Reported on 5/3/2023) 100 Tablet 3    [DISCONTINUED] atorvastatin (LIPITOR) 80 MG tablet Take 1 Tablet by mouth every evening. 100 Tablet 3    [DISCONTINUED] clopidogrel (PLAVIX) 75 MG Tab Take 1 Tablet by mouth every day. 90 Tablet 3    [DISCONTINUED] hydrocortisone (ANUSOL-HC) 25 MG Suppos Insert 1 Suppository into the rectum every 12 hours. (Patient not taking: Reported on 5/3/2023) 12 Suppository 1    [DISCONTINUED] lidocaine 2 % Gel Apply 1 Application topically as needed (rectal pain). (Patient not taking: Reported on 5/3/2023) 30 mL 1    [DISCONTINUED] Blood Glucose Test Strips 1 Strip 2 times a day. One Touch Ultra 200 Strip 3    [DISCONTINUED] traMADol (ULTRAM) 50 MG Tab Take 50 mg by mouth every 6 hours as needed for Severe Pain. (Patient not taking: Reported on 5/3/2023)       No facility-administered encounter medications on file as of 5/3/2023.         Review of Systems   Constitutional:  Negative for malaise/fatigue.   Respiratory:  Negative for cough and shortness of breath.    Cardiovascular:  Negative for chest pain, palpitations, leg swelling and PND.   Gastrointestinal:  Negative for diarrhea.   Musculoskeletal:  Positive for myalgias.   Neurological:  Negative for dizziness.        Objective:   /64 (BP Location: Right arm, Patient Position: Sitting, BP Cuff Size: Adult)   Pulse 80   Resp 14   Ht 1.778 m (5' 10\")   Wt 81.6 kg (180 lb)   SpO2 98%   BMI 25.83 kg/m²        Physical Exam  Vitals reviewed.   Constitutional:       General: He is not in acute " distress.     Comments: Wheelchair for ambulation   HENT:      Head: Normocephalic and atraumatic.   Cardiovascular:      Rate and Rhythm: Normal rate and regular rhythm.      Heart sounds: No murmur heard.     Comments: Radial pulses 2+ bilaterally  PT pulses 2+ bilaterally    Pulmonary:      Effort: Pulmonary effort is normal. No respiratory distress.      Breath sounds: No rales.   Abdominal:      General: There is no distension.   Musculoskeletal:      Right lower leg: No edema.      Left lower leg: No edema.      Comments: Left arm hemiparesis   Skin:     General: Skin is warm and dry.   Neurological:      General: No focal deficit present.      Mental Status: He is alert.   Psychiatric:         Judgment: Judgment normal.          Assessment:     1. Paroxysmal atrial fibrillation ([facility])        2. Hypomagnesemia  MAGNESIUM      3. Coronary artery disease involving native coronary artery of native heart without angina pectoris  clopidogrel (PLAVIX) 75 MG Tab      4. ([facility]) Hyperlipidemia associated with type 2 diabetes mellitus  atorvastatin (LIPITOR) 80 MG tablet      5. Type 2 diabetes mellitus with stage 3b chronic kidney disease, with long-term current use of insulin ([facility])        6. Presence of drug coated stents in left circumflex coronary artery        7. Presence of BMS and drug coated stents in LAD coronary artery        8. ([facility]) Hypertension associated with diabetes        9. PVD (peripheral vascular disease) ([facility])             Medical Decision Making:  Today's Assessment / Plan:   Dyslipidemia  CAD s/p remote stenting to  RCA, LAD, OM   -last in 2009.   - no angina symptoms, is mostly in wheel chair, although does work with PT.   -Continue clopidogrel 75  - LDL is above goal of 70 with high dose atorva.  Repeat lipid profile this week.  Recommend addition of Zetia okay to stop fenofibrate and monitor triglycerides patient and his wife are concerned about polypharmacy. We also discussed avoid saturated fats  in the diet and less animal based products.          Essential hypertension, benign  -Improved control without any symptomatic hypotension, several readings from the current above goal however given his recent symptomatic hypotension we will continue current medication regimen and monitor blood pressures  - goal <130/80  -Follow-up every 6 months     Paroxysmal atrial fibrillation in setting of sepsis/hospitalization   - ?single documented episode, I went back in his chart to 2017 and did not find if this was in conjunction with his CVA/TIA  - see Dr. Tucker's note for previous discussion of anticoagulation/GI bleeds  -continue carvedilol (was previously on metoprolol)     CKDstage 3  -followed by Dr. Jarvis.       Cerebrovascular accident (CVA) due to embolism of cerebral artery   - reasonable to continue just single antiplatelet agent, statin therapy, blood pressure control       PAD without claudication/history of nonhealing wounds, resolved  -continue antiplatelet/statin    Hypomagnesemia  -Recheck magnesium with next lab     RTC in 6mo, sooner if problems

## 2023-05-03 ENCOUNTER — OFFICE VISIT (OUTPATIENT)
Dept: CARDIOLOGY | Facility: MEDICAL CENTER | Age: 78
End: 2023-05-03
Payer: MEDICARE

## 2023-05-03 VITALS
SYSTOLIC BLOOD PRESSURE: 110 MMHG | OXYGEN SATURATION: 98 % | HEIGHT: 70 IN | RESPIRATION RATE: 14 BRPM | BODY MASS INDEX: 25.77 KG/M2 | WEIGHT: 180 LBS | DIASTOLIC BLOOD PRESSURE: 64 MMHG | HEART RATE: 80 BPM

## 2023-05-03 DIAGNOSIS — I15.2 HYPERTENSION ASSOCIATED WITH DIABETES (HCC): ICD-10-CM

## 2023-05-03 DIAGNOSIS — E11.59 HYPERTENSION ASSOCIATED WITH DIABETES (HCC): ICD-10-CM

## 2023-05-03 DIAGNOSIS — E11.22 TYPE 2 DIABETES MELLITUS WITH STAGE 3B CHRONIC KIDNEY DISEASE, WITH LONG-TERM CURRENT USE OF INSULIN (HCC): ICD-10-CM

## 2023-05-03 DIAGNOSIS — E11.69 HYPERLIPIDEMIA ASSOCIATED WITH TYPE 2 DIABETES MELLITUS (HCC): ICD-10-CM

## 2023-05-03 DIAGNOSIS — N18.32 TYPE 2 DIABETES MELLITUS WITH STAGE 3B CHRONIC KIDNEY DISEASE, WITH LONG-TERM CURRENT USE OF INSULIN (HCC): ICD-10-CM

## 2023-05-03 DIAGNOSIS — Z95.5 PRESENCE OF DRUG COATED STENT IN LAD CORONARY ARTERY: ICD-10-CM

## 2023-05-03 DIAGNOSIS — I73.9 PVD (PERIPHERAL VASCULAR DISEASE) (HCC): ICD-10-CM

## 2023-05-03 DIAGNOSIS — E83.42 HYPOMAGNESEMIA: ICD-10-CM

## 2023-05-03 DIAGNOSIS — Z79.4 TYPE 2 DIABETES MELLITUS WITH STAGE 3B CHRONIC KIDNEY DISEASE, WITH LONG-TERM CURRENT USE OF INSULIN (HCC): ICD-10-CM

## 2023-05-03 DIAGNOSIS — I25.10 CORONARY ARTERY DISEASE INVOLVING NATIVE CORONARY ARTERY OF NATIVE HEART WITHOUT ANGINA PECTORIS: ICD-10-CM

## 2023-05-03 DIAGNOSIS — Z95.5 PRESENCE OF DRUG COATED STENT IN LEFT CIRCUMFLEX CORONARY ARTERY: ICD-10-CM

## 2023-05-03 DIAGNOSIS — E78.5 HYPERLIPIDEMIA ASSOCIATED WITH TYPE 2 DIABETES MELLITUS (HCC): ICD-10-CM

## 2023-05-03 DIAGNOSIS — I48.0 PAROXYSMAL ATRIAL FIBRILLATION (HCC): ICD-10-CM

## 2023-05-03 PROCEDURE — 99214 OFFICE O/P EST MOD 30 MIN: CPT | Performed by: PHYSICIAN ASSISTANT

## 2023-05-03 RX ORDER — CLOPIDOGREL BISULFATE 75 MG/1
75 TABLET ORAL
Qty: 90 TABLET | Refills: 3 | Status: SHIPPED | OUTPATIENT
Start: 2023-05-03

## 2023-05-03 RX ORDER — ATORVASTATIN CALCIUM 80 MG/1
80 TABLET, FILM COATED ORAL EVERY EVENING
Qty: 100 TABLET | Refills: 3 | Status: SHIPPED | OUTPATIENT
Start: 2023-05-03

## 2023-05-03 ASSESSMENT — FIBROSIS 4 INDEX: FIB4 SCORE: 1.87

## 2023-05-03 ASSESSMENT — ENCOUNTER SYMPTOMS
MYALGIAS: 1
SHORTNESS OF BREATH: 0
COUGH: 0
DIARRHEA: 0
PND: 0
DIZZINESS: 0
PALPITATIONS: 0

## 2023-05-05 ENCOUNTER — HOSPITAL ENCOUNTER (OUTPATIENT)
Facility: MEDICAL CENTER | Age: 78
End: 2023-05-05
Attending: INTERNAL MEDICINE
Payer: MEDICARE

## 2023-05-05 LAB
CREAT UR-MCNC: 55.47 MG/DL
MICROALBUMIN UR-MCNC: 7.8 MG/DL
MICROALBUMIN/CREAT UR: 141 MG/G (ref 0–30)

## 2023-05-05 PROCEDURE — 82570 ASSAY OF URINE CREATININE: CPT

## 2023-05-05 PROCEDURE — 82043 UR ALBUMIN QUANTITATIVE: CPT

## 2023-05-13 ENCOUNTER — PATIENT MESSAGE (OUTPATIENT)
Dept: CARDIOLOGY | Facility: MEDICAL CENTER | Age: 78
End: 2023-05-13
Payer: MEDICARE

## 2023-05-13 ENCOUNTER — HOSPITAL ENCOUNTER (OUTPATIENT)
Dept: LAB | Facility: MEDICAL CENTER | Age: 78
End: 2023-05-13
Attending: INTERNAL MEDICINE
Payer: MEDICARE

## 2023-05-13 DIAGNOSIS — E78.5 HYPERLIPIDEMIA ASSOCIATED WITH TYPE 2 DIABETES MELLITUS (HCC): ICD-10-CM

## 2023-05-13 DIAGNOSIS — E83.42 HYPOMAGNESEMIA: ICD-10-CM

## 2023-05-13 DIAGNOSIS — E11.69 HYPERLIPIDEMIA ASSOCIATED WITH TYPE 2 DIABETES MELLITUS (HCC): ICD-10-CM

## 2023-05-13 LAB
CHOLEST SERPL-MCNC: 141 MG/DL (ref 100–199)
HDLC SERPL-MCNC: 33 MG/DL
LDLC SERPL CALC-MCNC: 82 MG/DL
MAGNESIUM SERPL-MCNC: 1.3 MG/DL (ref 1.5–2.5)
TRIGL SERPL-MCNC: 131 MG/DL (ref 0–149)

## 2023-05-13 PROCEDURE — 36415 COLL VENOUS BLD VENIPUNCTURE: CPT

## 2023-05-13 PROCEDURE — 80061 LIPID PANEL: CPT

## 2023-05-13 PROCEDURE — 83735 ASSAY OF MAGNESIUM: CPT

## 2023-05-16 ENCOUNTER — PATIENT MESSAGE (OUTPATIENT)
Dept: CARDIOLOGY | Facility: MEDICAL CENTER | Age: 78
End: 2023-05-16
Payer: MEDICARE

## 2023-05-17 ENCOUNTER — PATIENT MESSAGE (OUTPATIENT)
Dept: CARDIOLOGY | Facility: MEDICAL CENTER | Age: 78
End: 2023-05-17
Payer: MEDICARE

## 2023-05-17 DIAGNOSIS — E78.5 DYSLIPIDEMIA: ICD-10-CM

## 2023-05-17 DIAGNOSIS — I25.10 CORONARY ARTERY DISEASE INVOLVING NATIVE CORONARY ARTERY OF NATIVE HEART WITHOUT ANGINA PECTORIS: ICD-10-CM

## 2023-05-17 NOTE — RESULT ENCOUNTER NOTE
Av,   Your magnesium level is still low, seems like you aren't absorbing the Magnesium oxide. There are few other types of magnesium you could take, such as Slow Mag 600mg twice a day to see if you absorb this better.   Additionally your LDL cholesterol remains elevated. I recommend adding the Zetia 10mg like we talked about. Your triglycerides are fine without the fenofibrate.   Let me know what you think.     Hannah

## 2023-05-17 NOTE — PATIENT COMMUNICATION
"To ROSA , LDL 82.  Ok to switch Fenofibrate to Zetia?  If so, please advise on dosing and frequency for Zetia.  Thank you!    =================  Upon chart review, per ROSA last OV note, \"Recommend addition of Zetia okay to stop fenofibrate and monitor triglycerides patient and his wife are concerned about polypharmacy. We also discussed avoid saturated fats in the diet and less animal based products.\"  "

## 2023-05-18 ENCOUNTER — PATIENT OUTREACH (OUTPATIENT)
Dept: HEALTH INFORMATION MANAGEMENT | Facility: OTHER | Age: 78
End: 2023-05-18
Payer: MEDICARE

## 2023-05-18 DIAGNOSIS — N18.32 TYPE 2 DIABETES MELLITUS WITH STAGE 3B CHRONIC KIDNEY DISEASE, WITH LONG-TERM CURRENT USE OF INSULIN (HCC): ICD-10-CM

## 2023-05-18 DIAGNOSIS — I25.10 CORONARY ARTERY DISEASE INVOLVING NATIVE CORONARY ARTERY OF NATIVE HEART WITHOUT ANGINA PECTORIS: ICD-10-CM

## 2023-05-18 DIAGNOSIS — Z79.4 TYPE 2 DIABETES MELLITUS WITH STAGE 3B CHRONIC KIDNEY DISEASE, WITH LONG-TERM CURRENT USE OF INSULIN (HCC): ICD-10-CM

## 2023-05-18 DIAGNOSIS — E11.22 TYPE 2 DIABETES MELLITUS WITH STAGE 3B CHRONIC KIDNEY DISEASE, WITH LONG-TERM CURRENT USE OF INSULIN (HCC): ICD-10-CM

## 2023-05-18 NOTE — PROGRESS NOTES
Nurse CM outreach call for monthly CCM assessment. Pt answered telephone. Reports he is feeling tired today. States his spouse is currently not available to talk to nurse CM. Reports spouse will be back later today. Nurse will contact spouse later to see if any CM needs and to review care plan.     Assessment 5/19/23    Nurse outreach call to pt's spouse for monthly assessment and follow-up. Spouse reports pt has been doing okay today. Reports pt has had intermittent confusion over the past several days. States it is better today. Spouse states she took pt to lab today to complete UA that PCP ordered. Spouse states she was concerned pt had possible UTI causing confusion. Reports pt has not been complaining of any pain or burning with  urination. Reports no fever. Spouse states home care nurse did make a visit this am. Pt being followed by [facility]. Spouse states nurse sent results of UA to PCP and also urology office. Urine culture is pending. Spouse questioning if pt needs antibiotics. Spouse states blood sugars have been in a good range. Reports blood sugar reading last night was 112. Reports reading this am was in 130 range. Nurse will route message to PCP.     Education    Continue to monitor symptoms. Drink plenty of water and stay hydrated. Notify PCP if any worsening symptoms.     Plan of Care and Goals    Reviewed care plan.     Barriers:    Limited mobility    Progress:    Continue to work on goals.     Next outreach: One month, pt currently following with home care.

## 2023-05-19 ENCOUNTER — HOSPITAL ENCOUNTER (OUTPATIENT)
Facility: MEDICAL CENTER | Age: 78
End: 2023-05-19
Attending: INTERNAL MEDICINE
Payer: MEDICARE

## 2023-05-19 LAB
APPEARANCE UR: ABNORMAL
BACTERIA #/AREA URNS HPF: ABNORMAL /HPF
BILIRUB UR QL STRIP.AUTO: NEGATIVE
COLOR UR: YELLOW
EPI CELLS #/AREA URNS HPF: ABNORMAL /HPF
GLUCOSE UR STRIP.AUTO-MCNC: NEGATIVE MG/DL
KETONES UR STRIP.AUTO-MCNC: NEGATIVE MG/DL
LEUKOCYTE ESTERASE UR QL STRIP.AUTO: ABNORMAL
MICRO URNS: ABNORMAL
NITRITE UR QL STRIP.AUTO: NEGATIVE
PH UR STRIP.AUTO: 5.5 [PH] (ref 5–8)
PROT UR QL STRIP: NEGATIVE MG/DL
RBC # URNS HPF: ABNORMAL /HPF
RBC UR QL AUTO: ABNORMAL
SP GR UR STRIP.AUTO: <=1.005
UNIDENT CRYS URNS QL MICRO: ABNORMAL /HPF
WBC #/AREA URNS HPF: ABNORMAL /HPF
YEAST #/AREA URNS HPF: ABNORMAL /HPF
YEAST HYPHAE #/AREA URNS HPF: PRESENT /HPF

## 2023-05-19 PROCEDURE — 81001 URINALYSIS AUTO W/SCOPE: CPT

## 2023-05-19 PROCEDURE — 87086 URINE CULTURE/COLONY COUNT: CPT

## 2023-05-19 RX ORDER — EZETIMIBE 10 MG/1
10 TABLET ORAL DAILY
Qty: 90 TABLET | Refills: 3 | Status: SHIPPED | OUTPATIENT
Start: 2023-05-19 | End: 2023-06-30

## 2023-05-21 LAB
BACTERIA UR CULT: NORMAL
SIGNIFICANT IND 70042: NORMAL
SITE SITE: NORMAL
SOURCE SOURCE: NORMAL

## 2023-05-25 ENCOUNTER — OFFICE VISIT (OUTPATIENT)
Dept: PHYSICAL MEDICINE AND REHAB | Facility: MEDICAL CENTER | Age: 78
End: 2023-05-25
Payer: MEDICARE

## 2023-05-25 VITALS
SYSTOLIC BLOOD PRESSURE: 110 MMHG | BODY MASS INDEX: 24.38 KG/M2 | TEMPERATURE: 96.9 F | OXYGEN SATURATION: 94 % | WEIGHT: 180 LBS | DIASTOLIC BLOOD PRESSURE: 62 MMHG | HEART RATE: 84 BPM | HEIGHT: 72 IN

## 2023-05-25 DIAGNOSIS — I69.954 HEMIPLEGIA AND HEMIPARESIS FOLLOWING UNSPECIFIED CEREBROVASCULAR DISEASE AFFECTING LEFT NON-DOMINANT SIDE (HCC): Primary | ICD-10-CM

## 2023-05-25 DIAGNOSIS — R25.2 SPASTICITY: ICD-10-CM

## 2023-05-25 DIAGNOSIS — Z86.73 HISTORY OF STROKE: ICD-10-CM

## 2023-05-25 PROCEDURE — 64643 CHEMODENERV 1 EXTREM 1-4 EA: CPT | Performed by: PHYSICAL MEDICINE & REHABILITATION

## 2023-05-25 PROCEDURE — 1170F FXNL STATUS ASSESSED: CPT | Performed by: PHYSICAL MEDICINE & REHABILITATION

## 2023-05-25 PROCEDURE — 3074F SYST BP LT 130 MM HG: CPT | Performed by: PHYSICAL MEDICINE & REHABILITATION

## 2023-05-25 PROCEDURE — 3078F DIAST BP <80 MM HG: CPT | Performed by: PHYSICAL MEDICINE & REHABILITATION

## 2023-05-25 PROCEDURE — 64642 CHEMODENERV 1 EXTREMITY 1-4: CPT | Performed by: PHYSICAL MEDICINE & REHABILITATION

## 2023-05-25 PROCEDURE — 76942 ECHO GUIDE FOR BIOPSY: CPT | Performed by: PHYSICAL MEDICINE & REHABILITATION

## 2023-05-25 ASSESSMENT — FIBROSIS 4 INDEX: FIB4 SCORE: 1.87

## 2023-05-25 NOTE — PROCEDURES
[facility]  Physical Medicine & Rehabilitation Clinic  [facility] [place] [address]  Ph: [phone]    PROCEDURE NOTE    Procedure: Botulinum Injection  Primary Diagnosis & Secondary Diagnoses:   Visit Diagnoses     ICD-10-CM   1. Hemiplegia and hemiparesis following unspecified cerebrovascular disease affecting left non-dominant side (HCC)  I69.954   2. Spasticity  R25.2   3. History of stroke  Z86.73       HPI:   Toxin injections have been efficacious for spasticity. Without injections spasticity is significantly worse. Patient wishes to continue with toxin injections.     Patient has tried and failed, or had sub-optimal result with other interventions such as conservative measures (stretching, physical therapy, occupational therapy, TENS unit) and oral pharmacologic interventions. Oral pharmacologic agents are unable to be further titrated due to medication side effects which impair function.     Vitals:    05/25/23 1446   BP: 110/62   Pulse: 84   Temp: 36.1 °C (96.9 °F)   SpO2: 94%         INFORMED CONSENT   The risks and benefits of the procedure were explained to the patient, and all questions were answered. Benefits of the injection include spasticity reduction by decrease in muscle activation following toxin injection. Adverse effects from toxin injection include but are not limited to: excessive weakness, infection, breathing and/or swallowing difficulty.  Common adverse effects from the injection itself are pain and bruising. The patient demonstrated good understanding of risks and benefits and consents to botulinum toxin injection.     Received botulinum toxin product in last 3 mos: No  Have recently received abx (aminoglycosides): No  Take anti-spasticity medications: No  Take allergy/cold medicine:  No  Take a sleep medicine: No  Lactating/pregnant: No  Known NMJ disease: No  Human albumin allergy: No    Pre-procedure time out was performed.     PROCEDURE:  Usual sterile procedure was  observed with sterile prep with chlorhexidine. Ultrasound was used for needle guidance for the injections in the following muscles. Ultrasound indicated to allow greater accuracy and to minimize potential damage to nerves, arteries, veins, and injection of unintended muscles. A negative aspiration of blood was obtained, and the following was injected into each muscle.    Botox: left upper extremity + lower extremity  Location Botox Amount in Units   Pec sonya and minor 50 units each muscle (100 total)   Biceps    200 units (4 locations)   Hamstring - semimembranosus 100 units (2 locations)   Hamstring - semitendinosus  100 units (2 locations)   Hamstring - Bicep femoris long head and short head 100 units (2 locations)   Total Units  600 units      Patient did not get as much benefit from lower extremity Botox at last visit so today we decided to increase the number of muscles targeted in the left upper extremity for maximal benefit.    Injection sites were cleaned with alcohol and band aid was applied afterwards.  The patient tolerated the procedure well.  There were no complications.  The images were uploaded for permanent storage    MEDICATION USED:  200 units of botulinum toxin type A, mixed with 2mL of sterile, preservative-free normal saline in four 1cc syringes, for a total of 100 units in 1 mL. Repeated for other vial.    Total units injected: 600 units  Total units discarded: 0 units    See MAR for Botox details.     Counseling: Pt was instructed to seek immediate medical attention if fever, difficulty breathing, difficulty swallowing, or other concerning sxs arise. RTC in 13 weeks repeat injections    Additional: Consider wrist flexors - not terribly bothersome, patient doesn't use his hand at all, consider Xeomin for efficacy    PATIENT SUPPLIED - OPTUM    BOTOX 200 unit vials (3 vials)  NDC 5508-3986-64  Lot [id number]  Exp 04/2025    Dr. Lorie Huff DO, MS  Department of Physical Medicine &  Rehabilitation  [facility]  [facility]

## 2023-06-01 ENCOUNTER — PATIENT OUTREACH (OUTPATIENT)
Dept: HEALTH INFORMATION MANAGEMENT | Facility: OTHER | Age: 78
End: 2023-06-01
Payer: MEDICARE

## 2023-06-01 DIAGNOSIS — N18.32 TYPE 2 DIABETES MELLITUS WITH STAGE 3B CHRONIC KIDNEY DISEASE, WITH LONG-TERM CURRENT USE OF INSULIN (HCC): ICD-10-CM

## 2023-06-01 DIAGNOSIS — I69.954 HEMIPLEGIA AND HEMIPARESIS FOLLOWING UNSPECIFIED CEREBROVASCULAR DISEASE AFFECTING LEFT NON-DOMINANT SIDE (HCC): Primary | ICD-10-CM

## 2023-06-01 DIAGNOSIS — Z79.4 TYPE 2 DIABETES MELLITUS WITH STAGE 3B CHRONIC KIDNEY DISEASE, WITH LONG-TERM CURRENT USE OF INSULIN (HCC): ICD-10-CM

## 2023-06-01 DIAGNOSIS — E11.22 TYPE 2 DIABETES MELLITUS WITH STAGE 3B CHRONIC KIDNEY DISEASE, WITH LONG-TERM CURRENT USE OF INSULIN (HCC): ICD-10-CM

## 2023-06-01 NOTE — PROGRESS NOTES
Spouse called to report patient blood sugars have been running higher in the evening and also elevated fasting results. States she has increased evening Toujeo to 10-12 units. States bedtime readings have been ranging 200-250.  Reports patient hasn't changed diet.  Spouse wanted to update PCP that she has increased Toujeo as patient was previously taking 2 units at bedtime. Spouse reported some recent readings as follows:     5/29                      pm  213  5/30   am: 119    pm: 246  ( 12 units Toujeo)  5/31                    pm: 202     (12 units Toujeo)  6/1    am: 145    Spouse will continue to monitor readings.  Update to PCP.

## 2023-06-22 ENCOUNTER — PATIENT OUTREACH (OUTPATIENT)
Dept: HEALTH INFORMATION MANAGEMENT | Facility: OTHER | Age: 78
End: 2023-06-22
Payer: MEDICARE

## 2023-06-22 DIAGNOSIS — E11.22 TYPE 2 DIABETES MELLITUS WITH STAGE 3B CHRONIC KIDNEY DISEASE, WITH LONG-TERM CURRENT USE OF INSULIN (HCC): ICD-10-CM

## 2023-06-22 DIAGNOSIS — Z79.4 TYPE 2 DIABETES MELLITUS WITH STAGE 3B CHRONIC KIDNEY DISEASE, WITH LONG-TERM CURRENT USE OF INSULIN (HCC): ICD-10-CM

## 2023-06-22 DIAGNOSIS — I25.10 CORONARY ARTERY DISEASE INVOLVING NATIVE CORONARY ARTERY OF NATIVE HEART WITHOUT ANGINA PECTORIS: ICD-10-CM

## 2023-06-22 DIAGNOSIS — N18.32 TYPE 2 DIABETES MELLITUS WITH STAGE 3B CHRONIC KIDNEY DISEASE, WITH LONG-TERM CURRENT USE OF INSULIN (HCC): ICD-10-CM

## 2023-06-22 PROCEDURE — 99490 CHRNC CARE MGMT STAFF 1ST 20: CPT | Performed by: INTERNAL MEDICINE

## 2023-06-22 NOTE — PROGRESS NOTES
6/22/23 1:20 pm: Nurse CM  outreach call for monthly CCM assessment. VM left with CM contact number requesting a return call to nurse at [phone].    1:30 pm Nurse CM spoke to patient's spouse for monthly CCM assessment.    Assessment    Spouse reports pt continues to have fatigue. States [facility] is no longer making visits. Pt was receiving physical therapy. Spouse reports patient's blood sugars continue to fluctuate. Reports pt running higher at bedtime:  6/21 236 at bedtime. 6/22 am 117. Spouse states she is generally giving 16-18 units of Toujeo at bedtime. Reports morning readings, 134, 159, 143. Spouse reports pt's appetite is good. Spouse states pt having intermittent  memory issues with events and names. Reports pt doesn't have any current UTI symptoms such as fever, or pain with urination. Reports pt is incontinent of urine. Nurse reviewed upcoming appt with PCP for 8/29. Spouse not sure if PCP recommends sooner appt. Nurse will route message to PCP.     Education    Continue to monitor blood sugars. Eat healthy balanced meals.     Plan of Care and Goals    Reviewed care plan and goals    Barriers:    Intermittent confusion    Progress:    Continue to work on progress.     Next outreach: One month  Nurse Care Coordinator:  Camilla Navarro  [phone]

## 2023-06-28 PROBLEM — I82.401 ACUTE DEEP VEIN THROMBOSIS (DVT) OF RIGHT LOWER EXTREMITY (HCC): Status: RESOLVED | Noted: 2022-06-19 | Resolved: 2023-06-28

## 2023-06-30 ENCOUNTER — OFFICE VISIT (OUTPATIENT)
Dept: MEDICAL GROUP | Facility: PHYSICIAN GROUP | Age: 78
End: 2023-06-30
Payer: MEDICARE

## 2023-06-30 VITALS
DIASTOLIC BLOOD PRESSURE: 72 MMHG | TEMPERATURE: 97.6 F | HEART RATE: 76 BPM | OXYGEN SATURATION: 94 % | RESPIRATION RATE: 14 BRPM | BODY MASS INDEX: 24.41 KG/M2 | HEIGHT: 72 IN | SYSTOLIC BLOOD PRESSURE: 124 MMHG

## 2023-06-30 DIAGNOSIS — N18.32 TYPE 2 DIABETES MELLITUS WITH STAGE 3B CHRONIC KIDNEY DISEASE, WITH LONG-TERM CURRENT USE OF INSULIN (HCC): ICD-10-CM

## 2023-06-30 DIAGNOSIS — E11.69 HYPERLIPIDEMIA ASSOCIATED WITH TYPE 2 DIABETES MELLITUS (HCC): ICD-10-CM

## 2023-06-30 DIAGNOSIS — E78.5 HYPERLIPIDEMIA ASSOCIATED WITH TYPE 2 DIABETES MELLITUS (HCC): ICD-10-CM

## 2023-06-30 DIAGNOSIS — E11.22 TYPE 2 DIABETES MELLITUS WITH STAGE 3B CHRONIC KIDNEY DISEASE, WITH LONG-TERM CURRENT USE OF INSULIN (HCC): ICD-10-CM

## 2023-06-30 DIAGNOSIS — Z79.4 TYPE 2 DIABETES MELLITUS WITH STAGE 3B CHRONIC KIDNEY DISEASE, WITH LONG-TERM CURRENT USE OF INSULIN (HCC): ICD-10-CM

## 2023-06-30 DIAGNOSIS — R41.0 CONFUSION: ICD-10-CM

## 2023-06-30 PROCEDURE — 3074F SYST BP LT 130 MM HG: CPT | Performed by: INTERNAL MEDICINE

## 2023-06-30 PROCEDURE — 99214 OFFICE O/P EST MOD 30 MIN: CPT | Performed by: INTERNAL MEDICINE

## 2023-06-30 PROCEDURE — 1170F FXNL STATUS ASSESSED: CPT | Performed by: INTERNAL MEDICINE

## 2023-06-30 PROCEDURE — 3078F DIAST BP <80 MM HG: CPT | Performed by: INTERNAL MEDICINE

## 2023-06-30 NOTE — ASSESSMENT & PLAN NOTE
Chronic ongoing problem, increased insulin needs in recent time, unclear why.  Toujeo currently at 18 units, had gone low as 2 to 4 units but has been slowly trending up.  A1c is at goal.  No hypoglycemia, some sugars have been elevated in the mid 200s to low/mid 300 range.  That is been improving since they have been dialing up the Toujeo in response to the hyperglycemia.  We will follow-up in August as planned, will update Toujeo amount to 18 to 22 units per 24 hours

## 2023-06-30 NOTE — PROGRESS NOTES
Subjective:   Chief Complaint/History of Present Illness:  Av Bob is a 78 y.o. male established patient who presents today to discuss medical problems as listed below. Av is is accompanied by his wife for today's visit.    Problem   Confusion    He developed confusion and profound fatigue a few weeks after starting Zetia.  Study was started in mid May 2023 on them patient was having difficulty with both short and long-term memory.  While on the phone talking to his brother he had asked about his mom who passed away years ago.  Then at 1 point he cannot remember his wife's name.  This was very alarming so after considering what changes had been made most recently they decided to stop the Zetia and within a day or 2 his mentation was back to baseline and his fatigue is continue to improve over the past week.     Type 2 Diabetes Mellitus With Stage 3b Chronic Kidney Disease, With Long-Term Current Use of Insulin (Hcc)     Latest Reference Range & Units 04/24/23 04:00   Glycohemoglobin 5.8 % 6.8 !     Longstanding history of type 2 diabetes on insulin.  Previously followed with endocrinology, Dr. Rasheed.     Current regimen includes Toujeo 18-22 units daily, Humalog 5 units for sugars between 101-150, increase by 2 units if greater than 150.  Correction dosage is 2: 50 greater than 150.    Holding Trulicity since most recent hospitalization in June 2022, have not resumed.     Complicated by retinal involvement, neuropathy, CKDIII-IV, and microalbuminuria.     (HCC) Hyperlipidemia associated with type 2 diabetes mellitus     Latest Reference Range & Units 04/29/22 11:18   Cholesterol,Tot 100 - 199 mg/dL 129   Triglycerides 0 - 149 mg/dL 99   HDL >=40 mg/dL 29 !   LDL <100 mg/dL 80     History of stroke, CAD, DM2, and  hyperlipidemia. Currently on high dose atorvastatin 80 mg daily and fenofibrate 145 mg daily. Follows with cardiology, Dr. Tucker. Interested in considering adding PCSK9 inhibitor  therapy in place of statin/fibrate as LDL not at goal.    Zetia have been added to medication regiment however he developed profound fatigue and confusional state which resolved after he stopped the Zetia.  We will monitor without this medicine moving forward.          Current Medications:  Current Outpatient Medications Ordered in Epic   Medication Sig Dispense Refill    clopidogrel (PLAVIX) 75 MG Tab Take 1 Tablet by mouth every day. 90 Tablet 3    atorvastatin (LIPITOR) 80 MG tablet Take 1 Tablet by mouth every evening. 100 Tablet 3    HUMALOG KWIKPEN 100 UNIT/ML Solution Pen-injector injection PEN Inject 5 Units under the skin 3 times a day before meals. Humalog 5 units for sugars between 101-150, increase by 2 units if greater than 150.  Correction dosage is 2:50 greater than 150 9 mL 3    BOTOX 200 units Recon Soln injection       glucose blood (ONETOUCH ULTRA) strip 1 Strip by Other route 3 times a day. 300 Strip 3    fluoxetine (PROZAC) 40 MG capsule Take 1 Capsule by mouth every day. 90 Capsule 3    amLODIPine (NORVASC) 10 MG Tab Take 1 Tablet by mouth every day. 90 Tablet 3    carvedilol (COREG) 3.125 MG Tab Take 1 Tablet by mouth 2 times a day with meals. 200 Tablet 3    hydrALAZINE (APRESOLINE) 10 MG Tab Take 1 Tablet by mouth 3 times a day as needed (SBP >160). 100 Tablet 3    gabapentin (NEURONTIN) 100 MG Cap Take 1 Capsule by mouth 3 times a day as needed (pain). 90 Capsule 5    losartan (COZAAR) 50 MG Tab Take 1 Tablet by mouth 2 times a day. 180 Tablet 2    allopurinol (ZYLOPRIM) 100 MG Tab Take 1 Tablet by mouth every evening. 100 Tablet 3    Insulin Pen Needle (PEN NEEDLES) 32G X 4 MM Misc 1 Each 5 Times a Day. USE FOR INSULIN SHOTS FIVE TIMES DAILY 500 Each 3    finasteride (PROSCAR) 5 MG Tab Take 5 mg by mouth every evening.      Non Formulary Request Take 250 mg by mouth 2 times a day. Floastor (Probiotic)      Insulin Glargine, 2 Unit Dial, (TOUJEO MAX SOLOSTAR) 300 UNIT/ML Solution  Pen-injector Inject 18-22 Units under the skin at bedtime.      Multiple Vitamin (MULTIVITAMIN PO) Take 1 Tablet by mouth every morning.      Cholecalciferol (VITAMIN D) 2000 UNIT Tab Take 2,000 Units by mouth every morning.      Potassium Chloride CR (MICRO-K) 8 MEQ Cap CR capsule Take 1 Capsule by mouth every 48 hours. 45 Capsule 3    magnesium oxide (MAG-OX) 400 MG Tab tablet Take 800 mg by mouth 2 times a day.      omeprazole (PRILOSEC) 20 MG delayed-release capsule Take 1 Capsule by mouth every day. 30 Capsule 2    acetaminophen (TYLENOL) 325 MG Tab Take 2 Tablets by mouth every 6 hours as needed for Mild Pain, Moderate Pain or Fever. 30 Tablet 0     No current Epic-ordered facility-administered medications on file.          Objective:   Physical Exam:    Vitals: /72   Pulse 76   Temp 36.4 °C (97.6 °F) (Temporal)   Resp 14   Ht 1.829 m (6')   SpO2 94%    BMI: Body mass index is 24.41 kg/m².  Physical Exam  Constitutional:       General: He is not in acute distress.     Appearance: He is not ill-appearing.   Eyes:      General: No scleral icterus.     Conjunctiva/sclera: Conjunctivae normal.   Cardiovascular:      Rate and Rhythm: Normal rate and regular rhythm.      Pulses: Normal pulses.   Pulmonary:      Effort: Pulmonary effort is normal. No respiratory distress.      Breath sounds: No wheezing or rhonchi.   Abdominal:      General: Bowel sounds are normal. There is no distension.      Tenderness: There is no abdominal tenderness.   Skin:     General: Skin is warm and dry.   Neurological:      Gait: Gait abnormal.   Psychiatric:         Mood and Affect: Mood normal.         Behavior: Behavior normal.         Thought Content: Thought content normal.         Judgment: Judgment normal.          Assessment and Plan:   Av is a 78 y.o. male with the following:  Problem List Items Addressed This Visit       (HCC) Hyperlipidemia associated with type 2 diabetes mellitus     Chronic decompensated  problem, shortly after starting Zetia he developed significant confusion and profound fatigue.  As it was the newest medicine added his wife held it and within few days his cognition was back to baseline and his fatigue was much improved.  We will message cardiology to let them know that we can hold this medicine, they would be open to a rechallenge in the future if cardiology felt it was necessary for him to be back on this medicine.         Confusion     New decompensated problem.  Had ruled out urinary tract infection with [place] [facility], most recent addition of medicine was at a gas so this was stopped.  His confusional problem cleared up almost immediately, he was at a point where he could not remember his wife's name or the fact that his mother had .  We will continue holding his Zetia and we will update cardiology, in the future discussed that normally we collect lab work, consider CT of the head, review medicines, and make sure he is sleeping well and does not have any new pain.         Type 2 diabetes mellitus with stage 3b chronic kidney disease, with long-term current use of insulin ([facility])     Chronic ongoing problem, increased insulin needs in recent time, unclear why.  Toujeo currently at 18 units, had gone low as 2 to 4 units but has been slowly trending up.  A1c is at goal.  No hypoglycemia, some sugars have been elevated in the mid 200s to low/mid 300 range.  That is been improving since they have been dialing up the Toujeo in response to the hyperglycemia.  We will follow-up in August as planned, will update Toujeo amount to 18 to 22 units per 24 hours               RTC: Return if symptoms worsen or fail to improve.    I spent a total of 32 minutes with record review, exam, communication with the patient, communication with other providers, and documentation of this encounter.    PLEASE NOTE: This dictation was created using voice recognition software. I have made every reasonable attempt to  correct obvious errors, but I expect that there are errors of grammar and possibly content that I did not discover before finalizing the note.      Madison Bunch, DO  Geriatric and Internal Medicine  [facility]

## 2023-06-30 NOTE — ASSESSMENT & PLAN NOTE
Chronic decompensated problem, shortly after starting Zetia he developed significant confusion and profound fatigue.  As it was the newest medicine added his wife held it and within few days his cognition was back to baseline and his fatigue was much improved.  We will message cardiology to let them know that we can hold this medicine, they would be open to a rechallenge in the future if cardiology felt it was necessary for him to be back on this medicine.

## 2023-06-30 NOTE — ASSESSMENT & PLAN NOTE
New decompensated problem.  Had ruled out urinary tract infection with [facility], most recent addition of medicine was at a gas so this was stopped.  His confusional problem cleared up almost immediately, he was at a point where he could not remember his wife's name or the fact that his mother had .  We will continue holding his Zetia and we will update cardiology, in the future discussed that normally we collect lab work, consider CT of the head, review medicines, and make sure he is sleeping well and does not have any new pain.

## 2023-07-24 ENCOUNTER — PATIENT OUTREACH (OUTPATIENT)
Dept: HEALTH INFORMATION MANAGEMENT | Facility: OTHER | Age: 78
End: 2023-07-24
Payer: MEDICARE

## 2023-07-24 DIAGNOSIS — E11.22 TYPE 2 DIABETES MELLITUS WITH STAGE 3B CHRONIC KIDNEY DISEASE, WITH LONG-TERM CURRENT USE OF INSULIN (HCC): ICD-10-CM

## 2023-07-24 DIAGNOSIS — Z79.4 TYPE 2 DIABETES MELLITUS WITH STAGE 3B CHRONIC KIDNEY DISEASE, WITH LONG-TERM CURRENT USE OF INSULIN (HCC): ICD-10-CM

## 2023-07-24 DIAGNOSIS — N18.32 TYPE 2 DIABETES MELLITUS WITH STAGE 3B CHRONIC KIDNEY DISEASE, WITH LONG-TERM CURRENT USE OF INSULIN (HCC): ICD-10-CM

## 2023-07-24 DIAGNOSIS — I25.10 CORONARY ARTERY DISEASE INVOLVING NATIVE CORONARY ARTERY OF NATIVE HEART WITHOUT ANGINA PECTORIS: ICD-10-CM

## 2023-07-24 NOTE — PROGRESS NOTES
Nurse CM outreach call for monthly CCM assessment. Spouse answered telephone.    Assessment    Spouse reports pt doing okay. Reports blood sugars still fluctuating. Spouse reports giving pt  Toujeo 18-22 units as night, depending on blood sugar readings.. States she did discuss with PCP at last visit that insulin dose of Toujeo was increased. Spouse reports patient's confusion has improved. States pt does still experience some intermittent confusion at time, worse in the later afternoon. Nurse reviewed upcoming appointments with PCP.     Education    Continue to monitor blood sugars. Follow-up with specialists as recommended. Follow heart healthy meal plan.    Plan of Care and Goals    Reviewed care plan and goals    Barriers:    Chronic healthy conditions    Progress:    Continue to work on progress    Next outreach:  One month  Nurse Care Coordinator:  Camilla Navarro  [phone]

## 2023-07-26 ENCOUNTER — APPOINTMENT (RX ONLY)
Dept: URBAN - METROPOLITAN AREA CLINIC 35 | Facility: CLINIC | Age: 78
Setting detail: DERMATOLOGY
End: 2023-07-26

## 2023-07-26 DIAGNOSIS — D22 MELANOCYTIC NEVI: ICD-10-CM

## 2023-07-26 DIAGNOSIS — Z87.2 PERSONAL HISTORY OF DISEASES OF THE SKIN AND SUBCUTANEOUS TISSUE: ICD-10-CM

## 2023-07-26 DIAGNOSIS — L81.4 OTHER MELANIN HYPERPIGMENTATION: ICD-10-CM

## 2023-07-26 DIAGNOSIS — Z71.89 OTHER SPECIFIED COUNSELING: ICD-10-CM

## 2023-07-26 DIAGNOSIS — L57.0 ACTINIC KERATOSIS: ICD-10-CM

## 2023-07-26 DIAGNOSIS — L82.1 OTHER SEBORRHEIC KERATOSIS: ICD-10-CM

## 2023-07-26 DIAGNOSIS — Z85.828 PERSONAL HISTORY OF OTHER MALIGNANT NEOPLASM OF SKIN: ICD-10-CM

## 2023-07-26 PROBLEM — D22.5 MELANOCYTIC NEVI OF TRUNK: Status: ACTIVE | Noted: 2023-07-26

## 2023-07-26 PROBLEM — D23.5 OTHER BENIGN NEOPLASM OF SKIN OF TRUNK: Status: ACTIVE | Noted: 2023-07-26

## 2023-07-26 PROCEDURE — ? ADDITIONAL NOTES

## 2023-07-26 PROCEDURE — 99213 OFFICE O/P EST LOW 20 MIN: CPT | Mod: 25

## 2023-07-26 PROCEDURE — ? COUNSELING

## 2023-07-26 PROCEDURE — 17003 DESTRUCT PREMALG LES 2-14: CPT

## 2023-07-26 PROCEDURE — 17000 DESTRUCT PREMALG LESION: CPT

## 2023-07-26 PROCEDURE — ? LIQUID NITROGEN

## 2023-07-26 ASSESSMENT — LOCATION ZONE DERM
LOCATION ZONE: EAR
LOCATION ZONE: HAND
LOCATION ZONE: TRUNK
LOCATION ZONE: ARM
LOCATION ZONE: FACE
LOCATION ZONE: FACE
LOCATION ZONE: SCALP

## 2023-07-26 ASSESSMENT — LOCATION SIMPLE DESCRIPTION DERM
LOCATION SIMPLE: RIGHT SHOULDER
LOCATION SIMPLE: RIGHT EYEBROW
LOCATION SIMPLE: LEFT HAND
LOCATION SIMPLE: RIGHT UPPER BACK
LOCATION SIMPLE: SCALP
LOCATION SIMPLE: LEFT FOREARM
LOCATION SIMPLE: LEFT SHOULDER
LOCATION SIMPLE: ABDOMEN
LOCATION SIMPLE: RIGHT HAND
LOCATION SIMPLE: RIGHT SCALP
LOCATION SIMPLE: LEFT EAR
LOCATION SIMPLE: LEFT CHEEK
LOCATION SIMPLE: RIGHT FOREARM

## 2023-07-26 ASSESSMENT — LOCATION DETAILED DESCRIPTION DERM
LOCATION DETAILED: RIGHT RADIAL DORSAL HAND
LOCATION DETAILED: LEFT DORSAL MIDDLE METACARPOPHALANGEAL JOINT
LOCATION DETAILED: RIGHT DISTAL DORSAL FOREARM
LOCATION DETAILED: RIGHT POSTERIOR SHOULDER
LOCATION DETAILED: LEFT RADIAL DORSAL HAND
LOCATION DETAILED: LEFT DISTAL DORSAL FOREARM
LOCATION DETAILED: LEFT SUPERIOR HELIX
LOCATION DETAILED: RIGHT LATERAL EYEBROW
LOCATION DETAILED: LEFT POSTERIOR SHOULDER
LOCATION DETAILED: RIGHT INFERIOR UPPER BACK
LOCATION DETAILED: LEFT CENTRAL MANDIBULAR CHEEK
LOCATION DETAILED: PERIUMBILICAL SKIN
LOCATION DETAILED: LEFT DISTAL RADIAL DORSAL FOREARM
LOCATION DETAILED: RIGHT SUPERIOR PARIETAL SCALP
LOCATION DETAILED: RIGHT MEDIAL UPPER BACK
LOCATION DETAILED: LEFT ANTIHELIX
LOCATION DETAILED: RIGHT CENTRAL FRONTAL SCALP

## 2023-07-26 NOTE — PROCEDURE: ADDITIONAL NOTES
Detail Level: Simple
Render Risk Assessment In Note?: no
Additional Notes: Used Fluorouracil/Calcipotriene creams to treat scalp and hands.

## 2023-07-26 NOTE — PROCEDURE: LIQUID NITROGEN
Show Aperture Variable?: Yes
Consent: The patient's consent was obtained including but not limited to risks of crusting, scabbing, blistering, scarring, darker or lighter pigmentary change, recurrence, incomplete removal and infection.
Detail Level: Detailed
Render Note In Bullet Format When Appropriate: No
Duration Of Freeze Thaw-Cycle (Seconds): 10
Post-Care Instructions: I reviewed with the patient in detail post-care instructions. Patient is to wear sunprotection, and avoid picking at any of the treated lesions. Pt may apply Vaseline to crusted or scabbing areas.
Application Tool (Optional): Cry-AC
Number Of Freeze-Thaw Cycles: 1 freeze-thaw cycle

## 2023-07-28 DIAGNOSIS — I10 ESSENTIAL HYPERTENSION, BENIGN: ICD-10-CM

## 2023-07-28 NOTE — TELEPHONE ENCOUNTER
Is the patient due for a refill? Yes    Was the patient seen the past year? Yes    Date of last office visit: 5/3/2023    Does the patient have an upcoming appointment?  Yes   If yes, When? 11/8/2023    Provider to refill:ROSA    Does the patients insurance require a 100 day supply?  No

## 2023-08-02 DIAGNOSIS — I10 ESSENTIAL HYPERTENSION, BENIGN: ICD-10-CM

## 2023-08-03 RX ORDER — LOSARTAN POTASSIUM 50 MG/1
50 TABLET ORAL 2 TIMES DAILY
Qty: 180 TABLET | Refills: 2 | Status: SHIPPED | OUTPATIENT
Start: 2023-08-03 | End: 2023-11-14

## 2023-08-06 ENCOUNTER — PATIENT MESSAGE (OUTPATIENT)
Dept: CARDIOLOGY | Facility: MEDICAL CENTER | Age: 78
End: 2023-08-06
Payer: MEDICARE

## 2023-08-07 RX ORDER — POTASSIUM CHLORIDE 600 MG/1
8 CAPSULE, EXTENDED RELEASE ORAL
Qty: 45 CAPSULE | Refills: 3 | Status: SHIPPED | OUTPATIENT
Start: 2023-08-07

## 2023-08-10 DIAGNOSIS — I69.954 HEMIPLEGIA AND HEMIPARESIS FOLLOWING UNSPECIFIED CEREBROVASCULAR DISEASE AFFECTING LEFT NON-DOMINANT SIDE (HCC): ICD-10-CM

## 2023-08-21 ENCOUNTER — OFFICE VISIT (OUTPATIENT)
Dept: MEDICAL GROUP | Facility: PHYSICIAN GROUP | Age: 78
End: 2023-08-21
Payer: MEDICARE

## 2023-08-21 VITALS
TEMPERATURE: 96.6 F | HEIGHT: 70 IN | DIASTOLIC BLOOD PRESSURE: 62 MMHG | HEART RATE: 82 BPM | BODY MASS INDEX: 26.5 KG/M2 | OXYGEN SATURATION: 94 % | RESPIRATION RATE: 16 BRPM | SYSTOLIC BLOOD PRESSURE: 108 MMHG | WEIGHT: 185.1 LBS

## 2023-08-21 DIAGNOSIS — N18.32 TYPE 2 DIABETES MELLITUS WITH STAGE 3B CHRONIC KIDNEY DISEASE, WITH LONG-TERM CURRENT USE OF INSULIN (HCC): ICD-10-CM

## 2023-08-21 DIAGNOSIS — E11.59 HYPERTENSION ASSOCIATED WITH DIABETES (HCC): ICD-10-CM

## 2023-08-21 DIAGNOSIS — G31.9 CEREBRAL ATROPHY (HCC): ICD-10-CM

## 2023-08-21 DIAGNOSIS — Z79.4 TYPE 2 DIABETES MELLITUS WITH STAGE 3B CHRONIC KIDNEY DISEASE, WITH LONG-TERM CURRENT USE OF INSULIN (HCC): ICD-10-CM

## 2023-08-21 DIAGNOSIS — I15.2 HYPERTENSION ASSOCIATED WITH DIABETES (HCC): ICD-10-CM

## 2023-08-21 DIAGNOSIS — F33.1 MODERATE EPISODE OF RECURRENT MAJOR DEPRESSIVE DISORDER (HCC): ICD-10-CM

## 2023-08-21 DIAGNOSIS — E11.22 TYPE 2 DIABETES MELLITUS WITH STAGE 3B CHRONIC KIDNEY DISEASE, WITH LONG-TERM CURRENT USE OF INSULIN (HCC): ICD-10-CM

## 2023-08-21 PROBLEM — Z09 HOSPITAL DISCHARGE FOLLOW-UP: Status: RESOLVED | Noted: 2022-06-27 | Resolved: 2023-08-21

## 2023-08-21 PROBLEM — K59.00 DYSCHEZIA: Status: RESOLVED | Noted: 2022-09-22 | Resolved: 2023-08-21

## 2023-08-21 LAB
HBA1C MFR BLD: 7 % (ref ?–5.8)
POCT INT CON NEG: NEGATIVE
POCT INT CON POS: POSITIVE

## 2023-08-21 PROCEDURE — 99214 OFFICE O/P EST MOD 30 MIN: CPT | Performed by: INTERNAL MEDICINE

## 2023-08-21 PROCEDURE — 3078F DIAST BP <80 MM HG: CPT | Performed by: INTERNAL MEDICINE

## 2023-08-21 PROCEDURE — 1170F FXNL STATUS ASSESSED: CPT | Performed by: INTERNAL MEDICINE

## 2023-08-21 PROCEDURE — 3074F SYST BP LT 130 MM HG: CPT | Performed by: INTERNAL MEDICINE

## 2023-08-21 PROCEDURE — 83036 HEMOGLOBIN GLYCOSYLATED A1C: CPT | Performed by: INTERNAL MEDICINE

## 2023-08-21 RX ORDER — ZINC OXIDE 13 %
CREAM (GRAM) TOPICAL
COMMUNITY

## 2023-08-21 ASSESSMENT — FIBROSIS 4 INDEX: FIB4 SCORE: 1.87

## 2023-08-21 NOTE — LETTER
ROLI  Madison Bunch D.O.  [address]  [place] [address]  Fax: [phone]   Authorization for Release/Disclosure of   Protected Health Information   Name: ELIGIO MADRIGAL : 1945 SSN: [id number]   Address: [address]  [address]  [place] [address] Phone:    [phone] (home)    I authorize the entity listed below to release/disclose the PHI below to:   ROLI/Madison Bunch D.O. and Madison Bunch D.O.   Provider or Entity Name:  [facility]- [facility] Noel   Address   City, [facility], [facility]   Phone:      Fax:     Reason for request: continuity of care   Information to be released: note and lab results   [  ] LAST COLONOSCOPY,  including any PATH REPORT and follow-up  [  ] LAST FIT/COLOGUARD RESULT [  ] LAST DEXA  [  ] LAST MAMMOGRAM  [  ] LAST PAP  [  ] LAST LABS [  ] RETINA EXAM REPORT  [  ] IMMUNIZATION RECORDS  [ x ] Release all info      [ x ] Check here and initial the line next to each item to release ALL health information INCLUDING  _____ Care and treatment for drug and / or alcohol abuse  _____ HIV testing, infection status, or AIDS  _____ Genetic Testing    DATES OF SERVICE OR TIME PERIOD TO BE DISCLOSED: __2023___  I understand and acknowledge that:  * This Authorization may be revoked at any time by you in writing, except if your health information has already been used or disclosed.  * Your health information that will be used or disclosed as a result of you signing this authorization could be re-disclosed by the recipient. If this occurs, your re-disclosed health information may no longer be protected by State or Federal laws.  * You may refuse to sign this Authorization. Your refusal will not affect your ability to obtain treatment.  * This Authorization becomes effective upon signing and will  on (date) __________.      If no date is indicated, this Authorization will  one (1) year from the signature date.     Name: Av Bob  Signature: Date:   8/21/2023     PLEASE FAX REQUESTED RECORDS BACK TO: [phone]

## 2023-08-22 NOTE — PROGRESS NOTES
Subjective:   Chief Complaint/History of Present Illness:  Av Bob is a 78 y.o. male established patient who presents today to discuss medical problems as listed below. Av is is accompanied by his wife for today's visit.    Problem   Cerebral Atrophy (Hcc)    Incidental finding of cerebral atrophy on previous head imaging. He has cognitive impairment and sundowning behavior and has excellent support from his wife Usha and his family.     (HCC) Moderate episode of recurrent major depressive disorder    He was noted to be depressed after his major medical events in early 2017.  A few years ago his initiated on Prozac and current dose is 40 mg daily.  His wife notes that he is always been emotional and will cry easily at [employer] movies or commercials.  She notes that he still does continue to cry sporadically but it resolves quickly.  No inappropriate laughing or other indication of pseudobulbar affect.      Previously reduced dose from 40 mg to 20 mg but symptoms worsened so went back up.    Current regimen: fluoxetine 40 mg daily     ([facility]) Hypertension associated with diabetes    He has a history of hypertension with dose reduction over the past year due to signs/symptoms of over treatment.    Home blood pressure ranges 110's-140's/60-70     He follows with both cardiology, urology, nephrology.    Current regimen: Amlodipine 10 mg daily, carvedilol 3.125 mg twice daily, losartan 50 mg twice daily, and hydralazine as needed for SBP greater than 160     Type 2 Diabetes Mellitus With Stage 3b Chronic Kidney Disease, With Long-Term Current Use of Insulin ([facility])     Latest Reference Range & Units 08/21/23 17:01   Glycohemoglobin 5.8 % 7.0 !     Longstanding history of type 2 diabetes on insulin.  Previously followed with endocrinology, Dr. Rasheed.     Current regimen includes Toujeo 18-25 units daily, Humalog 5 units for sugars between 101-150, increase by 2 units if greater than 150.  Correction  dosage is 2: 50 greater than 150.    Holding Trulicity since most recent hospitalization in June 2022, have not resumed.     Complicated by retinal involvement, neuropathy, CKDIII-IV, and microalbuminuria.          Current Medications:  Current Outpatient Medications Ordered in Epic   Medication Sig Dispense Refill    Probiotic Product (PROBIOTIC DAILY) Cap Take  by mouth. Floaster      Potassium Chloride CR (MICRO-K) 8 MEQ Cap CR capsule TAKE 1 CAPSULE BY MOUTH EVERY 48 HOURS 45 Capsule 3    losartan (COZAAR) 50 MG Tab TAKE 1 TABLET BY MOUTH TWICE DAILY 180 Tablet 2    clopidogrel (PLAVIX) 75 MG Tab Take 1 Tablet by mouth every day. 90 Tablet 3    atorvastatin (LIPITOR) 80 MG tablet Take 1 Tablet by mouth every evening. 100 Tablet 3    HUMALOG KWIKPEN 100 UNIT/ML Solution Pen-injector injection PEN Inject 5 Units under the skin 3 times a day before meals. Humalog 5 units for sugars between 101-150, increase by 2 units if greater than 150.  Correction dosage is 2:50 greater than 150 9 mL 3    BOTOX 200 units Recon Soln injection       glucose blood (ONETOUCH ULTRA) strip 1 Strip by Other route 3 times a day. 300 Strip 3    fluoxetine (PROZAC) 40 MG capsule Take 1 Capsule by mouth every day. 90 Capsule 3    amLODIPine (NORVASC) 10 MG Tab Take 1 Tablet by mouth every day. 90 Tablet 3    carvedilol (COREG) 3.125 MG Tab Take 1 Tablet by mouth 2 times a day with meals. 200 Tablet 3    hydrALAZINE (APRESOLINE) 10 MG Tab Take 1 Tablet by mouth 3 times a day as needed (SBP >160). 100 Tablet 3    gabapentin (NEURONTIN) 100 MG Cap Take 1 Capsule by mouth 3 times a day as needed (pain). 90 Capsule 5    allopurinol (ZYLOPRIM) 100 MG Tab Take 1 Tablet by mouth every evening. 100 Tablet 3    Insulin Pen Needle (PEN NEEDLES) 32G X 4 MM Misc 1 Each 5 Times a Day. USE FOR INSULIN SHOTS FIVE TIMES DAILY 500 Each 3    finasteride (PROSCAR) 5 MG Tab Take 5 mg by mouth every evening.      Non Formulary Request Take 250 mg by mouth 2  "times a day. Floastor (Probiotic)      Insulin Glargine, 2 Unit Dial, (TOUJEO MAX SOLOSTAR) 300 UNIT/ML Solution Pen-injector Inject 18-22 Units under the skin at bedtime.      Multiple Vitamin (MULTIVITAMIN PO) Take 1 Tablet by mouth every morning.      Cholecalciferol (VITAMIN D) 2000 UNIT Tab Take 2,000 Units by mouth every morning.      magnesium oxide (MAG-OX) 400 MG Tab tablet Take 800 mg by mouth 2 times a day.      omeprazole (PRILOSEC) 20 MG delayed-release capsule Take 1 Capsule by mouth every day. 30 Capsule 2    acetaminophen (TYLENOL) 325 MG Tab Take 2 Tablets by mouth every 6 hours as needed for Mild Pain, Moderate Pain or Fever. 30 Tablet 0     No current Epic-ordered facility-administered medications on file.          Objective:   Physical Exam:    Vitals: /62 (BP Location: Right arm, Patient Position: Sitting, BP Cuff Size: Adult)   Pulse 82   Temp 35.9 °C (96.6 °F) (Temporal)   Resp 16   Ht 1.778 m (5' 10\")   Wt 84 kg (185 lb 1.6 oz)   SpO2 94%    BMI: Body mass index is 26.56 kg/m².  Physical Exam  Constitutional:       General: He is not in acute distress.     Appearance: Normal appearance. He is not ill-appearing.   HENT:      Right Ear: External ear normal.      Left Ear: External ear normal.   Eyes:      General: No scleral icterus.     Conjunctiva/sclera: Conjunctivae normal.   Cardiovascular:      Rate and Rhythm: Normal rate and regular rhythm.      Pulses: Normal pulses.   Pulmonary:      Effort: Pulmonary effort is normal. No respiratory distress.      Breath sounds: Normal breath sounds. No wheezing or rhonchi.   Abdominal:      General: Bowel sounds are normal. There is no distension.      Palpations: Abdomen is soft.      Tenderness: There is no abdominal tenderness.   Musculoskeletal:      Comments: Arm spasticity   Lymphadenopathy:      Cervical: No cervical adenopathy.   Skin:     General: Skin is warm and dry.      Findings: No rash.   Neurological:      Gait: Gait " abnormal.   Psychiatric:         Mood and Affect: Mood normal.         Behavior: Behavior normal.      Comments: Memory impairment            Assessment and Plan:   Av is a 78 y.o. male with the following:  Problem List Items Addressed This Visit       ([facility]) Hypertension associated with diabetes     Chronic ongoing problem, monitor for overtreatment.  We will follow-up with Dr. Tucker in cardiology this fall.  Continue current regiment with amlodipine 10 mg daily, carvedilol 3.125 mg twice daily, losartan 50 mg twice daily.         (HCC) Moderate episode of recurrent major depressive disorder     Chronic ongoing problem, continue medical therapy with fluoxetine 40 mg daily.         Cerebral atrophy ([facility])     Chronic ongoing problem, incidental finding on imaging, has cognitive impairment from previous stroke and infection/cancer history. Wonderful support from wife and family. No additional testing or medicines needed at this time.         Type 2 diabetes mellitus with stage 3b chronic kidney disease, with long-term current use of insulin ([facility])     Chronic ongoing problem.  A1c currently at 7.0.  Overtreatment real concern due to impaired kidney function on functional capacity.  Continue current regiment with Toujeo often 18 to 25 units daily based on blood sugars and adjusted by his wife.  Also continues with Humalog with a correction scale.  No longer on GLP-1 since hospitalization last year. He has microalbuminuria and stage III chronic kidney disease and follows with nephrology and is on ARB.         Relevant Orders    POCT  A1C (Completed)          RTC: Return in about 6 months (around 2/21/2024).    I spent a total of 34 minutes with record review, exam, communication with the patient, communication with other providers, and documentation of this encounter.    PLEASE NOTE: This dictation was created using voice recognition software. I have made every reasonable attempt to correct obvious errors, but I  expect that there are errors of grammar and possibly content that I did not discover before finalizing the note.      Madison Bunch, DO  Geriatric and Internal Medicine  [facility]

## 2023-08-22 NOTE — ASSESSMENT & PLAN NOTE
Chronic ongoing problem, monitor for overtreatment.  We will follow-up with Dr. Tucker in cardiology this fall.  Continue current regiment with amlodipine 10 mg daily, carvedilol 3.125 mg twice daily, losartan 50 mg twice daily.

## 2023-08-22 NOTE — ASSESSMENT & PLAN NOTE
Chronic ongoing problem.  A1c currently at 7.0.  Overtreatment real concern due to impaired kidney function on functional capacity.  Continue current regiment with Toujeo often 18 to 25 units daily based on blood sugars and adjusted by his wife.  Also continues with Humalog with a correction scale.  No longer on GLP-1 since hospitalization last year. He has microalbuminuria and stage III chronic kidney disease and follows with nephrology and is on ARB.

## 2023-08-22 NOTE — ASSESSMENT & PLAN NOTE
Chronic ongoing problem, incidental finding on imaging, has cognitive impairment from previous stroke and infection/cancer history. Wonderful support from wife and family. No additional testing or medicines needed at this time.

## 2023-08-24 ENCOUNTER — OFFICE VISIT (OUTPATIENT)
Dept: PHYSICAL MEDICINE AND REHAB | Facility: MEDICAL CENTER | Age: 78
End: 2023-08-24
Payer: MEDICARE

## 2023-08-24 VITALS
TEMPERATURE: 96.3 F | SYSTOLIC BLOOD PRESSURE: 116 MMHG | HEART RATE: 79 BPM | BODY MASS INDEX: 25.06 KG/M2 | OXYGEN SATURATION: 94 % | HEIGHT: 72 IN | DIASTOLIC BLOOD PRESSURE: 66 MMHG | WEIGHT: 185 LBS

## 2023-08-24 DIAGNOSIS — R25.2 SPASTICITY: ICD-10-CM

## 2023-08-24 DIAGNOSIS — I69.954 HEMIPLEGIA AND HEMIPARESIS FOLLOWING UNSPECIFIED CEREBROVASCULAR DISEASE AFFECTING LEFT NON-DOMINANT SIDE (HCC): Primary | ICD-10-CM

## 2023-08-24 PROCEDURE — 1126F AMNT PAIN NOTED NONE PRSNT: CPT | Performed by: PHYSICAL MEDICINE & REHABILITATION

## 2023-08-24 PROCEDURE — 64643 CHEMODENERV 1 EXTREM 1-4 EA: CPT | Performed by: PHYSICAL MEDICINE & REHABILITATION

## 2023-08-24 PROCEDURE — 76942 ECHO GUIDE FOR BIOPSY: CPT | Performed by: PHYSICAL MEDICINE & REHABILITATION

## 2023-08-24 PROCEDURE — 3078F DIAST BP <80 MM HG: CPT | Performed by: PHYSICAL MEDICINE & REHABILITATION

## 2023-08-24 PROCEDURE — 3074F SYST BP LT 130 MM HG: CPT | Performed by: PHYSICAL MEDICINE & REHABILITATION

## 2023-08-24 PROCEDURE — 64642 CHEMODENERV 1 EXTREMITY 1-4: CPT | Performed by: PHYSICAL MEDICINE & REHABILITATION

## 2023-08-24 PROCEDURE — 1170F FXNL STATUS ASSESSED: CPT | Performed by: PHYSICAL MEDICINE & REHABILITATION

## 2023-08-24 ASSESSMENT — FIBROSIS 4 INDEX: FIB4 SCORE: 1.87

## 2023-08-24 ASSESSMENT — PAIN SCALES - GENERAL: PAINLEVEL: NO PAIN

## 2023-08-24 NOTE — PROCEDURES
[facility]  Physical Medicine & Rehabilitation Clinic  [address] [place] [address]  Ph: [phone]    PROCEDURE NOTE    Procedure: Botulinum Injection  Primary Diagnosis & Secondary Diagnoses:   Visit Diagnoses     ICD-10-CM   1. Hemiplegia and hemiparesis following unspecified cerebrovascular disease affecting left non-dominant side (HCC)  I69.954   2. Spasticity  R25.2       HPI:   Toxin injections have been efficacious for spasticity. Without injections spasticity is significantly worse. Patient wishes to continue with toxin injections.     Patient has tried and failed, or had sub-optimal result with other interventions such as conservative measures (stretching, physical therapy, occupational therapy, TENS unit) and oral pharmacologic interventions. Oral pharmacologic agents are unable to be further titrated due to medication side effects which impair function.     Vitals:    08/24/23 1530   BP: 116/66   Pulse: 79   Temp: (!) 35.7 °C (96.3 °F)   SpO2: 94%           INFORMED CONSENT   The risks and benefits of the procedure were explained to the patient, and all questions were answered. Benefits of the injection include spasticity reduction by decrease in muscle activation following toxin injection. Adverse effects from toxin injection include but are not limited to: excessive weakness, infection, breathing and/or swallowing difficulty.  Common adverse effects from the injection itself are pain and bruising. The patient demonstrated good understanding of risks and benefits and consents to botulinum toxin injection.     Received botulinum toxin product in last 3 mos: No  Have recently received abx (aminoglycosides): No  Take anti-spasticity medications: No  Take allergy/cold medicine:  No  Take a sleep medicine: No  Lactating/pregnant: No  Known NMJ disease: No  Human albumin allergy: No    Pre-procedure time out was performed.     PROCEDURE:  Usual sterile procedure was observed with sterile prep with  chlorhexidine. Ultrasound was used for needle guidance for the injections in the following muscles. Ultrasound indicated to allow greater accuracy and to minimize potential damage to nerves, arteries, veins, and injection of unintended muscles. A negative aspiration of blood was obtained, and the following was injected into each muscle.    Botox: left upper extremity + lower extremity  Location Botox Amount in Units   Pec sonya and minor 150 units total (3 locations)   Biceps    200 units (4 locations)   Hamstring - semimembranosus 150 units (3 locations)   Hamstring - semitendinosus  150 units (3 locations)   Hamstring - Bicep femoris long head and short head 150 units (3 locations)   Total Units  800 units        200 units of SAMPLES used to get better movement from PEC and HAMSTRING    Patient did not get as much benefit from lower extremity Botox at last visit so today we decided to increase the number of muscles targeted in the left upper extremity for maximal benefit.    Injection sites were cleaned with alcohol and band aid was applied afterwards.  The patient tolerated the procedure well.  There were no complications.  The images were uploaded for permanent storage    MEDICATION USED:  100 units of botulinum toxin type A, mixed with 2mL of sterile, preservative-free normal saline in two 1cc syringes, for a total of 50 units in 1 mL. Repeated for other vials.    200 units of botulinum toxin type A, mixed with 4mL of sterile, preservative-free normal saline in four 1cc syringes, for a total of 50 units in 1 mL. Repeated for other vials.    Total units injected: 800 units  Total units discarded: 0 units    See MAR for Botox details.     Counseling: Pt was instructed to seek immediate medical attention if fever, difficulty breathing, difficulty swallowing, or other concerning sxs arise. RTC in 13 weeks repeat injections    Additional: Consider wrist flexors - not terribly bothersome, patient doesn't use his hand  at all, consider Xeomin for efficacy    PATIENT SUPPLIED - [employer]UM    BOTOX 200 unit vials (1 vial)  NDC 9702-3829-14  Lot [id number]  Exp 02/2026    BOTOX 200 unit vials (1 vial)  NDC 8229-9733-84  Lot [id number]  Exp 02/2026    Dr. Lorie Huff DO, MS  Department of Physical Medicine & Rehabilitation  [facility]  [facility]

## 2023-08-29 ENCOUNTER — APPOINTMENT (OUTPATIENT)
Dept: MEDICAL GROUP | Facility: PHYSICIAN GROUP | Age: 78
End: 2023-08-29
Payer: MEDICARE

## 2023-08-31 ENCOUNTER — PATIENT OUTREACH (OUTPATIENT)
Dept: HEALTH INFORMATION MANAGEMENT | Facility: OTHER | Age: 78
End: 2023-08-31
Payer: MEDICARE

## 2023-08-31 DIAGNOSIS — Z79.4 TYPE 2 DIABETES MELLITUS WITH STAGE 3B CHRONIC KIDNEY DISEASE, WITH LONG-TERM CURRENT USE OF INSULIN (HCC): ICD-10-CM

## 2023-08-31 DIAGNOSIS — E11.22 TYPE 2 DIABETES MELLITUS WITH STAGE 3B CHRONIC KIDNEY DISEASE, WITH LONG-TERM CURRENT USE OF INSULIN (HCC): ICD-10-CM

## 2023-08-31 DIAGNOSIS — I25.10 CORONARY ARTERY DISEASE INVOLVING NATIVE CORONARY ARTERY OF NATIVE HEART WITHOUT ANGINA PECTORIS: ICD-10-CM

## 2023-08-31 DIAGNOSIS — N18.32 TYPE 2 DIABETES MELLITUS WITH STAGE 3B CHRONIC KIDNEY DISEASE, WITH LONG-TERM CURRENT USE OF INSULIN (HCC): ICD-10-CM

## 2023-08-31 PROCEDURE — 99490 CHRNC CARE MGMT STAFF 1ST 20: CPT | Performed by: INTERNAL MEDICINE

## 2023-08-31 NOTE — PROGRESS NOTES
8/31/23 11:50 Nurse CM outreach call for monthly CCM assessment. Spouse answered telephone. Requesting nurse CM call back later today.    8/31/23 3:40 pm: Nurse CM outreach call to spouse for follow-up. VM left with CM contact number requesting a return call to nurse at [phone].    9/1/23 10:00 am:  Nurse CM outreach call to patient for monthly CCM assessment. Pt's spouse answered telephone.    Assessment    Pt's spouse reports pt has been doing good. Reports diabetes has been stable. Reports pt continues to take Toujeo 18-25 units. Last A1c was 7.0 on 8/21/23. Spouse reports pt has an area on left fifth toe that is reddened and bruised. States pt not complaining of any pain. Spouse not sure if pt bumped toe or how injury occurred. Reports pt did have an incident several days ago where he slipped out of bed and landed on floor. Reports pt didn't hit head. States he could have possible injured toe when he slid on floor. Spouse states she is keeping toe clean and dry. States she will continue to monitor and will contact nurse CM if needing appt with PCP. Declining scheduling appt at this time. States she will monitor toe.     Education    Reviewed notifying PCP or nurse CM if any worsening problem with toe. Spouse reports she does check pt's feet on a daily basis.     Plan of Care and Goals    Reviewed care plan and goals/ Update to PCP regarding pt toe injury.     Barriers:    Chronic health conditions    Progress:    Continue to work on progress    Next outreach:  One month  Nurse Care Coordinator:  Camilla Navarro  [phone]

## 2023-09-17 ENCOUNTER — PATIENT MESSAGE (OUTPATIENT)
Dept: CARDIOLOGY | Facility: MEDICAL CENTER | Age: 78
End: 2023-09-17
Payer: MEDICARE

## 2023-09-30 ENCOUNTER — HOSPITAL ENCOUNTER (OUTPATIENT)
Dept: LAB | Facility: MEDICAL CENTER | Age: 78
End: 2023-09-30
Attending: INTERNAL MEDICINE
Payer: MEDICARE

## 2023-09-30 DIAGNOSIS — I10 ESSENTIAL HYPERTENSION: ICD-10-CM

## 2023-09-30 DIAGNOSIS — N18.31 STAGE 3A CHRONIC KIDNEY DISEASE: ICD-10-CM

## 2023-09-30 DIAGNOSIS — D64.9 ANEMIA, UNSPECIFIED TYPE: ICD-10-CM

## 2023-09-30 LAB
ANION GAP SERPL CALC-SCNC: 11 MMOL/L (ref 7–16)
BUN SERPL-MCNC: 19 MG/DL (ref 8–22)
CALCIUM SERPL-MCNC: 9.2 MG/DL (ref 8.5–10.5)
CHLORIDE SERPL-SCNC: 103 MMOL/L (ref 96–112)
CO2 SERPL-SCNC: 26 MMOL/L (ref 20–33)
CREAT SERPL-MCNC: 0.9 MG/DL (ref 0.5–1.4)
ERYTHROCYTE [DISTWIDTH] IN BLOOD BY AUTOMATED COUNT: 44 FL (ref 35.9–50)
GFR SERPLBLD CREATININE-BSD FMLA CKD-EPI: 88 ML/MIN/1.73 M 2
GLUCOSE SERPL-MCNC: 135 MG/DL (ref 65–99)
HCT VFR BLD AUTO: 43.3 % (ref 42–52)
HGB BLD-MCNC: 14 G/DL (ref 14–18)
MCH RBC QN AUTO: 28 PG (ref 27–33)
MCHC RBC AUTO-ENTMCNC: 32.3 G/DL (ref 32.3–36.5)
MCV RBC AUTO: 86.6 FL (ref 81.4–97.8)
PLATELET # BLD AUTO: 261 K/UL (ref 164–446)
PMV BLD AUTO: 10.2 FL (ref 9–12.9)
POTASSIUM SERPL-SCNC: 3.7 MMOL/L (ref 3.6–5.5)
RBC # BLD AUTO: 5 M/UL (ref 4.7–6.1)
SODIUM SERPL-SCNC: 140 MMOL/L (ref 135–145)
WBC # BLD AUTO: 10.1 K/UL (ref 4.8–10.8)

## 2023-09-30 PROCEDURE — 36415 COLL VENOUS BLD VENIPUNCTURE: CPT

## 2023-09-30 PROCEDURE — 80048 BASIC METABOLIC PNL TOTAL CA: CPT

## 2023-09-30 PROCEDURE — 85027 COMPLETE CBC AUTOMATED: CPT

## 2023-10-02 ENCOUNTER — HOSPITAL ENCOUNTER (OUTPATIENT)
Facility: MEDICAL CENTER | Age: 78
End: 2023-10-02
Attending: INTERNAL MEDICINE
Payer: MEDICARE

## 2023-10-02 DIAGNOSIS — Z79.4 TYPE 2 DIABETES MELLITUS WITH STAGE 3B CHRONIC KIDNEY DISEASE, WITH LONG-TERM CURRENT USE OF INSULIN (HCC): ICD-10-CM

## 2023-10-02 DIAGNOSIS — N18.32 TYPE 2 DIABETES MELLITUS WITH STAGE 3B CHRONIC KIDNEY DISEASE, WITH LONG-TERM CURRENT USE OF INSULIN (HCC): ICD-10-CM

## 2023-10-02 DIAGNOSIS — N18.31 STAGE 3A CHRONIC KIDNEY DISEASE: ICD-10-CM

## 2023-10-02 DIAGNOSIS — R33.9 URINARY RETENTION: ICD-10-CM

## 2023-10-02 DIAGNOSIS — E11.22 TYPE 2 DIABETES MELLITUS WITH STAGE 3B CHRONIC KIDNEY DISEASE, WITH LONG-TERM CURRENT USE OF INSULIN (HCC): ICD-10-CM

## 2023-10-02 LAB
APPEARANCE UR: ABNORMAL
BACTERIA #/AREA URNS HPF: ABNORMAL /HPF
BILIRUB UR QL STRIP.AUTO: NEGATIVE
COLOR UR: YELLOW
CREAT UR-MCNC: 54.42 MG/DL
EPI CELLS #/AREA URNS HPF: NEGATIVE /HPF
GLUCOSE UR STRIP.AUTO-MCNC: NEGATIVE MG/DL
HYALINE CASTS #/AREA URNS LPF: ABNORMAL /LPF
KETONES UR STRIP.AUTO-MCNC: NEGATIVE MG/DL
LEUKOCYTE ESTERASE UR QL STRIP.AUTO: ABNORMAL
MICRO URNS: ABNORMAL
MICROALBUMIN UR-MCNC: 8.7 MG/DL
MICROALBUMIN/CREAT UR: 160 MG/G (ref 0–30)
NITRITE UR QL STRIP.AUTO: NEGATIVE
PH UR STRIP.AUTO: 6.5 [PH] (ref 5–8)
PROT UR QL STRIP: 30 MG/DL
RBC # URNS HPF: ABNORMAL /HPF
RBC UR QL AUTO: ABNORMAL
SP GR UR STRIP.AUTO: 1.01
UROBILINOGEN UR STRIP.AUTO-MCNC: 0.2 MG/DL
WBC #/AREA URNS HPF: ABNORMAL /HPF

## 2023-10-02 PROCEDURE — 87077 CULTURE AEROBIC IDENTIFY: CPT

## 2023-10-02 PROCEDURE — 82570 ASSAY OF URINE CREATININE: CPT

## 2023-10-02 PROCEDURE — 82043 UR ALBUMIN QUANTITATIVE: CPT

## 2023-10-02 PROCEDURE — 81001 URINALYSIS AUTO W/SCOPE: CPT

## 2023-10-02 PROCEDURE — 87186 SC STD MICRODIL/AGAR DIL: CPT

## 2023-10-02 PROCEDURE — 87086 URINE CULTURE/COLONY COUNT: CPT

## 2023-10-04 ENCOUNTER — PATIENT OUTREACH (OUTPATIENT)
Dept: HEALTH INFORMATION MANAGEMENT | Facility: OTHER | Age: 78
End: 2023-10-04
Payer: MEDICARE

## 2023-10-04 DIAGNOSIS — E11.22 TYPE 2 DIABETES MELLITUS WITH STAGE 3B CHRONIC KIDNEY DISEASE, WITH LONG-TERM CURRENT USE OF INSULIN (HCC): ICD-10-CM

## 2023-10-04 DIAGNOSIS — Z79.4 TYPE 2 DIABETES MELLITUS WITH STAGE 3B CHRONIC KIDNEY DISEASE, WITH LONG-TERM CURRENT USE OF INSULIN (HCC): ICD-10-CM

## 2023-10-04 DIAGNOSIS — N18.32 TYPE 2 DIABETES MELLITUS WITH STAGE 3B CHRONIC KIDNEY DISEASE, WITH LONG-TERM CURRENT USE OF INSULIN (HCC): ICD-10-CM

## 2023-10-04 PROCEDURE — 99999 PR NO CHARGE: CPT | Performed by: INTERNAL MEDICINE

## 2023-10-04 NOTE — PROGRESS NOTES
10/4/23 4:20pm: Nurse CM outreach call for monthly CCM assessment. VM left requesting return call to nurse at [phone].    10/5/23 10:20am:  Nurse CM outreach call for monthly CCM assessment. Spouse answered telephone.    Assessment    Pt's spouse reports pt has been doing well. Spouse reports she is monitoring blood sugars. Reports last night reading was 265. States pt received 30 units of Toujeo. Spouse reports she hasn't checked blood sugar reading yet this am.  Yesterday morning reading was 115. Last A1C 8/21 was 7.0. Spouse reports pt does have an upcoming appointment with kidney specialist. Reports pt's toe improved where he previously had bumped toe on something last month. Spouse checking pt's feet on a regular  basis. Pt current risk score is at 3. Pt has been on CCM program for the past 12 months. Pt has met goals. Per spouse pt continues to have exercise  coming to house.     Education    Continue to follow with specialists. Monitor feet on a daily basis for cuts or sores.     Plan of Care and Goals    Reviewed. Goals have been met. Will discontinue CCM program at this time. Spouse agreeable with plan. Pt/spouse will contact nurse if needing any care coordination or assistance.     Barriers:    Chronic health conditions    Progress:    Pt has made good progress. Risk score has decreased from 8 to 3.     Next outreach: CCM program completed.                         Pt

## 2023-10-12 ENCOUNTER — TELEMEDICINE (OUTPATIENT)
Dept: NEPHROLOGY | Facility: MEDICAL CENTER | Age: 78
End: 2023-10-12
Payer: MEDICARE

## 2023-10-12 VITALS
BODY MASS INDEX: 25.06 KG/M2 | TEMPERATURE: 96.8 F | HEIGHT: 72 IN | SYSTOLIC BLOOD PRESSURE: 121 MMHG | DIASTOLIC BLOOD PRESSURE: 75 MMHG | WEIGHT: 185 LBS | OXYGEN SATURATION: 96 % | HEART RATE: 83 BPM

## 2023-10-12 DIAGNOSIS — R80.9 MICROALBUMINURIA DUE TO TYPE 2 DIABETES MELLITUS (HCC): ICD-10-CM

## 2023-10-12 DIAGNOSIS — R33.9 URINARY RETENTION: ICD-10-CM

## 2023-10-12 DIAGNOSIS — D64.9 ANEMIA, UNSPECIFIED TYPE: ICD-10-CM

## 2023-10-12 DIAGNOSIS — I10 ESSENTIAL HYPERTENSION: ICD-10-CM

## 2023-10-12 DIAGNOSIS — E11.29 MICROALBUMINURIA DUE TO TYPE 2 DIABETES MELLITUS (HCC): ICD-10-CM

## 2023-10-12 DIAGNOSIS — E55.9 VITAMIN D DEFICIENCY: ICD-10-CM

## 2023-10-12 DIAGNOSIS — N18.2 CKD (CHRONIC KIDNEY DISEASE), STAGE II: ICD-10-CM

## 2023-10-12 DIAGNOSIS — N39.0 URINARY TRACT INFECTION WITHOUT HEMATURIA, SITE UNSPECIFIED: ICD-10-CM

## 2023-10-12 PROCEDURE — 99213 OFFICE O/P EST LOW 20 MIN: CPT | Mod: 95 | Performed by: INTERNAL MEDICINE

## 2023-10-12 ASSESSMENT — ENCOUNTER SYMPTOMS
HEMOPTYSIS: 0
ABDOMINAL PAIN: 0
NAUSEA: 0
DIARRHEA: 0
PALPITATIONS: 0
FLANK PAIN: 0
COUGH: 0
SINUS PAIN: 0
WEIGHT LOSS: 0
FEVER: 0
CHILLS: 0
SHORTNESS OF BREATH: 0
VOMITING: 0
WHEEZING: 0
EYES NEGATIVE: 1
ORTHOPNEA: 0

## 2023-10-12 ASSESSMENT — FIBROSIS 4 INDEX: FIB4 SCORE: 2.11

## 2023-10-12 NOTE — PROGRESS NOTES
Telemedicine: Established Patient   This evaluation was conducted via Zoom using secure and encrypted videoconferencing technology. The patient was in their home in the [facility].    The patient's identity was confirmed and verbal consent was obtained for this virtual visit.    Subjective:   CC:   Chief Complaint   Patient presents with    Follow-Up    Chronic Kidney Disease       Av Bob is a 78 y.o. male presenting for evaluation and management of:CKD II  Recurrent UTI -seen in Urology Clinic -urine culture positive for Enterococcus fecalis  -on PCN  No fever/chills/ No N/V/D  No edema  CKD II -creat improved to 0,9 -baseline  HTN: BP well controlled - monitored at home  Anemia: Hb stable - WNL  Vit D and PTH very well controlled -at goal  Review of Systems   Constitutional:  Negative for chills, fever, malaise/fatigue and weight loss.   HENT:  Negative for congestion, hearing loss and sinus pain.    Eyes: Negative.    Respiratory:  Negative for cough, hemoptysis, shortness of breath and wheezing.    Cardiovascular:  Negative for chest pain, palpitations, orthopnea and leg swelling.   Gastrointestinal:  Negative for abdominal pain, diarrhea, nausea and vomiting.   Genitourinary:  Negative for dysuria, flank pain, frequency, hematuria and urgency.   Skin: Negative.    All other systems reviewed and are negative.        Allergies   Allergen Reactions    Diphenhydramine Anxiety     Pt is able to tolerate  Mg benadryl with less anxiety    Lorazepam Unspecified     Disorientation    Spironolactone Unspecified     Acute kidney injury    Ciprofloxacin Rash     Rash,stomach ache       Current medicines (including changes today)  Current Outpatient Medications   Medication Sig Dispense Refill    Insulin Glargine, 1 Unit Dial, (TOUJEO SOLOSTAR) 300 UNIT/ML Solution Pen-injector Inject 18-25 Units under the skin every day. 7.5 mL 3    Probiotic Product (PROBIOTIC DAILY) Cap Take  by mouth. Nida       Potassium Chloride CR (MICRO-K) 8 MEQ Cap CR capsule TAKE 1 CAPSULE BY MOUTH EVERY 48 HOURS 45 Capsule 3    losartan (COZAAR) 50 MG Tab TAKE 1 TABLET BY MOUTH TWICE DAILY 180 Tablet 2    clopidogrel (PLAVIX) 75 MG Tab Take 1 Tablet by mouth every day. 90 Tablet 3    atorvastatin (LIPITOR) 80 MG tablet Take 1 Tablet by mouth every evening. 100 Tablet 3    HUMALOG KWIKPEN 100 UNIT/ML Solution Pen-injector injection PEN Inject 5 Units under the skin 3 times a day before meals. Humalog 5 units for sugars between 101-150, increase by 2 units if greater than 150.  Correction dosage is 2:50 greater than 150 9 mL 3    BOTOX 200 units Recon Soln injection       glucose blood (ONETOUCH ULTRA) strip 1 Strip by Other route 3 times a day. 300 Strip 3    fluoxetine (PROZAC) 40 MG capsule Take 1 Capsule by mouth every day. 90 Capsule 3    amLODIPine (NORVASC) 10 MG Tab Take 1 Tablet by mouth every day. 90 Tablet 3    carvedilol (COREG) 3.125 MG Tab Take 1 Tablet by mouth 2 times a day with meals. 200 Tablet 3    hydrALAZINE (APRESOLINE) 10 MG Tab Take 1 Tablet by mouth 3 times a day as needed (SBP >160). 100 Tablet 3    gabapentin (NEURONTIN) 100 MG Cap Take 1 Capsule by mouth 3 times a day as needed (pain). 90 Capsule 5    allopurinol (ZYLOPRIM) 100 MG Tab Take 1 Tablet by mouth every evening. 100 Tablet 3    Insulin Pen Needle (PEN NEEDLES) 32G X 4 MM Misc 1 Each 5 Times a Day. USE FOR INSULIN SHOTS FIVE TIMES DAILY 500 Each 3    finasteride (PROSCAR) 5 MG Tab Take 5 mg by mouth every evening.      Multiple Vitamin (MULTIVITAMIN PO) Take 1 Tablet by mouth every morning.      Cholecalciferol (VITAMIN D) 2000 UNIT Tab Take 2,000 Units by mouth every morning.      magnesium oxide (MAG-OX) 400 MG Tab tablet Take 800 mg by mouth 2 times a day.      omeprazole (PRILOSEC) 20 MG delayed-release capsule Take 1 Capsule by mouth every day. 30 Capsule 2    acetaminophen (TYLENOL) 325 MG Tab Take 2 Tablets by mouth every 6 hours as needed  for Mild Pain, Moderate Pain or Fever. 30 Tablet 0    Insulin Glargine, 2 Unit Dial, (TOUJEO MAX SOLOSTAR) 300 UNIT/ML Solution Pen-injector Inject 18-22 Units under the skin at bedtime.       No current facility-administered medications for this visit.       Patient Active Problem List    Diagnosis Date Noted    Cerebral atrophy ([facility]) 08/21/2023    Confusion 06/30/2023    Contracture of muscle of left upper arm- s/p serial casting, severe left elbow pain 06/27/2022    Gout 06/07/2022    Vitamin D insufficiency 06/01/2022    Pressure injury of buttock, stage 1 02/17/2022    Right posterior hip pain and right leg cramping 09/09/2020    ASHLEY (obstructive sleep apnea) 08/27/2020    Nocturnal hypoxemia 07/06/2020    Chronic fatigue 06/09/2020    Essential hypertension, benign 05/06/2020    Polypharmacy 02/04/2020    Benign prostatic hyperplasia with urinary obstruction 01/29/2020    Altered mobility due to old stroke 01/22/2020    Neurogenic bladder 11/19/2019    PVD (peripheral vascular disease) ([facility]) 10/08/2019    GERD with esophagitis 10/19/2018    ([facility]) Left hemiparesis 12/27/2017    Psychophysiological insomnia 11/21/2017    ([facility]) Moderate episode of recurrent major depressive disorder 11/07/2017    Wheelchair dependent 11/07/2017    Hypomagnesemia 08/12/2017    Paroxysmal atrial fibrillation ([facility]) 05/23/2017    Iron deficiency anemia 05/20/2017    H/O non-Hodgkin's lymphoma 05/08/2017    ([facility]) Hypertension associated with diabetes 03/22/2017    Type 2 diabetes mellitus with stage 3b chronic kidney disease, with long-term current use of insulin ([facility]) 12/30/2016    H/O Cerebrovascular accident (CVA) in adulthood 12/30/2016    Coronary artery disease involving native coronary artery 12/30/2016    Idiopathic chronic gout of multiple sites without tophus 01/28/2014    Presence of BMS and drug coated stents in LAD coronary artery 12/13/2011    Presence of drug coated stents in left circumflex coronary artery 12/13/2011     Status post percutaneous transluminal coronary angioplasty 12/13/2011    ([facility]) Hyperlipidemia associated with type 2 diabetes mellitus 12/13/2011       Family History   Problem Relation Age of Onset    Heart Disease Father         CAD    Diabetes Father     Cancer Mother     Psychiatric Illness Mother         Depression    Depression Mother     Kidney stones Brother     Heart Disease Brother     Psychiatric Illness Brother         Depression    Depression Brother     Suicide Attempts Other     Psychiatric Illness Other         autism       He  has a past medical history of ([facility]) Chronic ulcer of right lower extremity with fat layer exposed (12/27/2018), Acute cystitis without hematuria (6/30/2019), Acute deep vein thrombosis (DVT) of RLE and partial DVT of LLE (6/19/2022), Acute on chronic renal failure ([facility]) (1/21/2020), Acute prostatitis (1/13/2022), Acute respiratory failure with hypoxia ([facility]) (5/20/2017), CAD (coronary artery disease), Cancer ([facility]), Cataract, Cerebrovascular accident (CVA) ([facility]) (12/30/2016), Chickenpox, CKD (chronic kidney disease) stage 3, GFR 30-59 ml/min ([facility]), Controlled gout (2014), Coronary atherosclerosis of native coronary artery, Daytime sleepiness, Depression, Diabetes ([facility]), Diarrhea (7/2/2019), Difficulty swallowing, Dyschezia (9/22/2022), Dysphagia (3/15/2021), Enterococcal septicemia ([facility]) (8/12/2017), Exposure to SARS-associated coronavirus (10/13/2021), Roberto hematuria (1/29/2020), Frequent urination, GERD (gastroesophageal reflux disease), Guyanese measles, History of renal calculi (11/19/2019), Hordeolum externum of left lower eyelid (9/14/2021), Hospital discharge follow-up (6/27/2022), Hydronephrosis (1/20/2020), Hypertension (06/30/2021), Hypokalemia (2012), Hypomagnesemia (08/12/2017), Influenza A (1/26/2020), Insomnia, Kidney stone, Left hand weakness (5/20/2019), Leg edema, right (1/22/2020), Lymphoma ([facility]) (2/19/2017), Mixed hyperlipidemia, Muscle strain of right  gluteal region (5/20/2019), Nephrolithiasis (2006), Normocytic hypochromic anemia (5/20/2017), NSTEMI (non-ST elevated myocardial infarction) ([facility]) (07/18/2017), Pain (06/30/2021), Peripheral vascular disease ([facility]), Polyneuropathy in diabetes(357.2) (9/11/2013), Pyelonephritis (5/24/2022), Renal cyst (1/29/2020), Renal mass (1/21/2020), Septic shock ([facility]) (5/20/2017), Skin ulcer of calf ([facility]) (2015), Stage 3b chronic kidney disease ([facility]) (8/25/2016), Stroke ([facility]) (2016), Toenail avulsion, initial encounter- left foot 3rd digit (7/12/2021), Urinary bladder disorder, Urinary incontinence, UTI (urinary tract infection) (5/28/2022), Weakness, and Wound of left leg (2012).    He has no past medical history of Suicide attempt ([facility]).  He  has a past surgical history that includes lithotripsy; angioplasty (1997); wound closure general (4/3/2012); tonsillectomy and adenoidectomy; cataract extraction with iol; solo by laparoscopy (1998); cystoscopy stent placement (Left, 2/12/2018); ureteroscopy (Left, 2/12/2018); lasertripsy (Left, 2/12/2018); RUST cardiac cath (1997); [facility] cardiac cath (2009); tonsillectomy; lumbar transforaminal epidural steroid injection (Right, 11/2/2020); lumbar transforaminal epidural steroid injection (Right, 3/29/2021); pr upper gi endoscopy,diagnosis (N/A, 7/21/2021); pr upper gi endoscopy,biopsy (N/A, 7/21/2021); and pr inj lumbar/sacral,w/ imaging (7/27/2021).       Objective:   /75 (BP Location: Right arm, Patient Position: Sitting, BP Cuff Size: Adult)   Pulse 83   Temp 36 °C (96.8 °F)   Ht 1.829 m (6')   Wt 83.9 kg (185 lb)   SpO2 96%   BMI 25.09 kg/m²     Physical Exam  Vitals reviewed.   Constitutional:       Appearance: Normal appearance.   Pulmonary:      Effort: Pulmonary effort is normal. No respiratory distress.   Neurological:      Mental Status: He is alert and oriented to person, place, and time.   Psychiatric:         Mood and Affect: Mood normal.         Behavior:  Behavior normal.         Thought Content: Thought content normal.         Judgment: Judgment normal.     Laboratory results reviewed: d/w pt   Latest Reference Range & Units 04/05/23 14:32 09/30/23 10:59   WBC 4.8 - 10.8 K/uL 10.4 10.1   RBC 4.70 - 6.10 M/uL 4.47 (L) 5.00   Hemoglobin 14.0 - 18.0 g/dL 12.7 (L) 14.0   Hematocrit 42.0 - 52.0 % 39.8 (L) 43.3   MCV 81.4 - 97.8 fL 89.0 86.6   MCH 27.0 - 33.0 pg 28.4 28.0   MCHC 32.3 - 36.5 g/dL 31.9 (L) 32.3   RDW 35.9 - 50.0 fL 50.4 (H) 44.0   Platelet Count 164 - 446 K/uL 295 261   MPV 9.0 - 12.9 fL 10.6 10.2   Sodium 135 - 145 mmol/L 141 140   Potassium 3.6 - 5.5 mmol/L 4.5 3.7   Chloride 96 - 112 mmol/L 105 103   Co2 20 - 33 mmol/L 24 26   Anion Gap 7.0 - 16.0  12.0 11.0   Glucose 65 - 99 mg/dL 207 (H) 135 (H)   Bun 8 - 22 mg/dL 35 (H) 19   Creatinine 0.50 - 1.40 mg/dL 1.31 0.90   GFR (CKD-EPI) >60 mL/min/1.73 m 2 56 ! 88   Calcium 8.5 - 10.5 mg/dL 8.8 9.2   (L): Data is abnormally low  (H): Data is abnormally high  !: Data is abnormal   Latest Reference Range & Units 10/02/23 09:00   Color  Yellow   Character  Cloudy !   Specific Gravity <1.035  1.014   Ph 5.0 - 8.0  6.5   Glucose Negative mg/dL Negative   Ketones Negative mg/dL Negative   Bilirubin Negative  Negative   Occult Blood Negative  Moderate !   Protein Negative mg/dL 30 !   Nitrite Negative  Negative   Leukocyte Esterase Negative  Large !   Urobilinogen, Urine Negative  0.2   Micro Urine Req  Microscopic   WBC /hpf  !   RBC /hpf 2-5 !   Epithelial Cells /hpf Negative   Bacteria None /hpf Rare !   Hyaline Cast /lpf 0-2   !: Data is abnormal  Enterococcus faecalis   ,000 cfu/mL   Assessment and Plan:   The following treatment plan was discussed:     1. CKD (chronic kidney disease), stage II  - Basic Metabolic Panel; Future    2. Essential hypertension  - Basic Metabolic Panel; Future    3. Microalbuminuria due to type 2 diabetes mellitus (HCC)    4. Vitamin D deficiency    5. Anemia, unspecified  type  - CBC WITHOUT DIFFERENTIAL; Future    6. Urinary retention  - Basic Metabolic Panel; Future    7. Urinary tract infection without hematuria, site unspecified    Recs:  Continue current treatment  Keep well hydrated  Monitor BP and call clinic of BP > 135/85  Low salt diet  F/u with Urology    Follow-up: Return in about 6 months (around 4/12/2024).

## 2023-10-12 NOTE — PATIENT INSTRUCTIONS
Continue current treatment  Keep well hydrated  Monitor BP and call clinic of BP > 135/85  Low salt diet  F/u with Urology

## 2023-10-18 ENCOUNTER — TELEPHONE (OUTPATIENT)
Dept: PHYSICAL MEDICINE AND REHAB | Facility: MEDICAL CENTER | Age: 78
End: 2023-10-18
Payer: MEDICARE

## 2023-10-18 NOTE — TELEPHONE ENCOUNTER
Phone Number Called: [phone]    Call outcome: Spoke to patient regarding message below.    Message: spoke with pt's wife (Usha)she states pt will be going to Dr. Huff from now on for Gabapentin refill.

## 2023-10-19 ENCOUNTER — HOSPITAL ENCOUNTER (OUTPATIENT)
Facility: MEDICAL CENTER | Age: 78
End: 2023-10-19
Attending: STUDENT IN AN ORGANIZED HEALTH CARE EDUCATION/TRAINING PROGRAM
Payer: MEDICARE

## 2023-10-19 PROCEDURE — 87086 URINE CULTURE/COLONY COUNT: CPT

## 2023-10-19 PROCEDURE — 87106 FUNGI IDENTIFICATION YEAST: CPT

## 2023-10-19 PROCEDURE — 87077 CULTURE AEROBIC IDENTIFY: CPT

## 2023-11-01 DIAGNOSIS — M1A.09X0 IDIOPATHIC CHRONIC GOUT OF MULTIPLE SITES WITHOUT TOPHUS: ICD-10-CM

## 2023-11-01 RX ORDER — ALLOPURINOL 100 MG/1
100 TABLET ORAL EVERY EVENING
Qty: 100 TABLET | Refills: 3 | Status: SHIPPED | OUTPATIENT
Start: 2023-11-01

## 2023-11-11 ENCOUNTER — HOSPITAL ENCOUNTER (OUTPATIENT)
Dept: RADIOLOGY | Facility: MEDICAL CENTER | Age: 78
End: 2023-11-11
Attending: UROLOGY
Payer: MEDICARE

## 2023-11-11 DIAGNOSIS — N28.1 CYST OF KIDNEY, ACQUIRED: ICD-10-CM

## 2023-11-11 PROCEDURE — 74183 MRI ABD W/O CNTR FLWD CNTR: CPT

## 2023-11-11 PROCEDURE — A9579 GAD-BASE MR CONTRAST NOS,1ML: HCPCS | Performed by: UROLOGY

## 2023-11-11 PROCEDURE — 700117 HCHG RX CONTRAST REV CODE 255: Performed by: UROLOGY

## 2023-11-11 RX ADMIN — GADOTERIDOL 17 ML: 279.3 INJECTION, SOLUTION INTRAVENOUS at 16:02

## 2023-11-13 NOTE — PROGRESS NOTES
Chief Complaint   Patient presents with    Coronary Artery Disease     Virtual follow up       This evaluation was conducted via Zoom using secure and encrypted videoconferencing technology. The patient was in their home in the [facility].    The patient's identity was confirmed and verbal consent was obtained for this virtual visit.      Subjective:   Av Bob is a 78 y.o. male who presents today for follow-up.    Patient of Dr. Tucker.  Current medical problems include CAD (stents to RCA, LAD, OM over 10 years ago), PAF, DM, HLD, CVA 2016 with residual L hemiparesis, HTN, CKD.    At last visit in May I recommended adding ezetimibe to the high intensity statin to reach LDL goal.  He did not tolerate this and had excessive fatigue on the medication.    Av denies any cardiac complaints today.  We review his blood pressure log which shows blood pressures generally ranging 130 or higher on the systolic readings.  He is not experiencing any lightheadedness or dizziness throughout the day.     Denies palpitations.    He continues to work with physical therapy 3 times a week for wheelchair exercises.     Past Medical History:   Diagnosis Date    ([facility]) Chronic ulcer of right lower extremity with fat layer exposed 12/27/2018    Acute cystitis without hematuria 6/30/2019    Acute deep vein thrombosis (DVT) of RLE and partial DVT of LLE 6/19/2022    US LE (6/2022):   1.  Acute appearing RIGHT posterior tibial vein DVT.  2.  Subacute appearing LEFT common femoral vein DVT and proximal femoral   vein junction DVT.   He had leg pain and ultrasound of bilateral lower extremities was completed which was positive for new DVT, acute in the right posterior tibial vein and subacute in the left common femoral and proximal femoral vein junction as noted    Acute on chronic renal failure (HCC) 1/21/2020    Acute prostatitis 1/13/2022    New problem of prostatitis identified by [employer] when patient  developed hematuria with rectal pain.  He followed up with his urology PA and was placed back on Bactrim.  He was recently on Bactrim and has a history of recurrent urinary tract infections related to neurogenic bladder from prior stroke.  Rectal pain is dissipated however he continues to have hematuria.  He had associated CATA o    Acute respiratory failure with hypoxia ([facility]) 5/20/2017    CAD (coronary artery disease)     GIOVANNA to RCA in '97, GIOVANNA X2 to LAD and GIOVANNA X2 to OM in '09    Cancer ([facility])     2017; chemo lympoma non hodgkins lymphoma    Cataract     dez iol    Cerebrovascular accident (CVA) ([facility]) 12/30/2016    Left arm weakness  etiology of stroke not established, lymphoma discovered on MRI evaluation of stroke, L hemiparesis much worse after acute infectious illness in mid 2017, but no specific diagnosed recurrent neurological etiology, all at [facility]    Chickenpox     CKD (chronic kidney disease) stage 3, GFR 30-59 ml/min ([facility])     Controlled gout 2014    Coronary atherosclerosis of native coronary artery     S/P PTCA (percutaneous transluminal coronary angioplasty), RCA, 5/1997, patent on cath 7/10/2009 at the time of interventions on his left anterior descending and circumflex coronary arteries    Daytime sleepiness     Depression     Diabetes ([facility])     Diarrhea 7/2/2019    Difficulty swallowing     Dyschezia 9/22/2022    For the past 6 months or so he has been experiencing significant pain with defecation.  Does not happen every time.  His wife who is his primary caregiver notes there is no hard stool when he has a painful episodes.  He also is having stool incontinence sometimes when he coughs and other times he will pass a full bowel movement and not be aware.  He has not yet seen GI for this problem.    Dysphagia 3/15/2021    He reports progressive worsening of his swallow function.  He notices at least once per week he is choking and coughing, can be with pills or  can be with food.  The episodes are quite frightening and he takes a bit of time for him to recover.  He has a prior history of stroke and recalls working with speech-language pathology at that time but does not remember if he did any formal esophagrams or s    Enterococcal septicemia (HCC) 8/12/2017    Exposure to SARS-associated coronavirus 10/13/2021    He reports viral illness last week with runny nose, congestion, productive cough, and wheezing.  He went to a play of his grandson and may have been exposed to Covid.  He was feeling very bad last Friday and over the weekend and now is at least 50% better.    Roberto hematuria 1/29/2020    Frequent urination     GERD (gastroesophageal reflux disease)     [place] measles     History of renal calculi 11/19/2019    Hordeolum externum of left lower eyelid 9/14/2021    He reports to clinic with 3 weeks of erythema and pain of the left lower eyelid. No clear inciting cause. Reports no globe pain. Lower > upper eyelid swelling and erythema. After 1.5 weeks had drainage of purulence from the lower medial eyelid. No photosensitivity. Using warm compresses. No changes in visual acuity. Saw retinal specialist around day 1 or 2 of symptoms who felt it could be related     Hospital discharge follow-up 6/27/2022    Av was admitted to the hospital from 5/28 to 5/31/2022 for weakness and sepsis related to urinary tract infection.  He was discharged to the [facility] rehab for 3 weeks to try to improve mobility and ADLs due to prior stroke.  This was complicated by C. difficile infection as well as lower extremity DVTs.  Diarrhea resolved with oral vancomycin.  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 Barry    Normocytic hypochromic anemia 5/20/2017    NSTEMI (non-ST elevated myocardial infarction) ([facility]) 07/18/2017    complicating UTI with sepsis    Pain 06/30/2021    right leg foot    Peripheral vascular disease ([facility])     Polyneuropathy in diabetes(357.2) 9/11/2013    Pyelonephritis 5/24/2022    Av had abrupt onset of illness starting on Sunday.  He felt unwell and then had projectile vomiting x3 episodes after dinner that evening.  He had associated nausea but no overt abdominal pain.  He has continued to have anorexia and is having difficulty with his p.o. intake.  He did get in some fruit in a month and yesterday and about 50 to 60 ounces of water.  He has chronic urinary retention    Renal cyst 1/29/2020    Renal mass 1/21/2020    Septic shock ([facility]) 5/20/2017    Skin ulcer of calf ([facility]) 2015    Right, Dr. Terry and wound care    Stage 3b chronic kidney disease ([facility]) 8/25/2016     Latest Reference Range & Units 04/29/22 11:14 Bun 8 - 22 mg/dL 33 (H) Creatinine 0.50 - 1.40 mg/dL 1.55 (H) GFR (CKD-EPI) >60 mL/min/1.73 m 2 46 ! [1]  He has a history of chronic kidney disease related to nephrolithiasis, recurrent UTIs, and BPH as well as history of hypertension and diabetes.  He is following with nephrology, Dr. Jarvis and urology, Dr. Barry.  Spironolactone was discontinued due     Stroke ([facility]) 2016    left sided weakness    Toenail avulsion, initial encounter- left foot 3rd digit 7/12/2021    They report that his toenail spontaneously came off last week.  He does not recall specific injury or any pain.  His significant other saw the nail on the floor with dried blood on his foot.  She cleaned the wound and covered it with gauze.  On follow-up in  clinic today the gauze has stuck to the wound bed but with saline was fairly easy to remove the dried gauze.  There was scant amount of bright    Urinary bladder disorder     Urinary incontinence     pt wears pads    UTI (urinary tract infection) 5/28/2022    Weakness     Wound of left leg 2012    Requiring surgery and debridment, Dr. Moore         Family History   Problem Relation Age of Onset    Heart Disease Father         CAD    Diabetes Father     Cancer Mother     Psychiatric Illness Mother         Depression    Depression Mother     Kidney stones Brother     Heart Disease Brother     Psychiatric Illness Brother         Depression    Depression Brother     Suicide Attempts Other     Psychiatric Illness Other         autism         Social History     Tobacco Use    Smoking status: Never    Smokeless tobacco: Never   Vaping Use    Vaping Use: Never used   Substance Use Topics    Alcohol use: Never    Drug use: Never         Allergies   Allergen Reactions    Diphenhydramine Anxiety     Pt is able to tolerate  Mg benadryl with less anxiety    Lorazepam Unspecified     Disorientation    Spironolactone Unspecified     Acute kidney injury    Ciprofloxacin Rash     Rash,stomach ache         Current Outpatient Medications   Medication Sig    olmesartan (BENICAR) 40 MG Tab Take 1 Tablet by mouth every evening.    allopurinol (ZYLOPRIM) 100 MG Tab Take 1 Tablet by mouth every evening.    gabapentin (NEURONTIN) 100 MG Cap Take 1 Capsule by mouth 3 times a day as needed (pain).    Insulin Glargine, 1 Unit Dial, (TOUJEO SOLOSTAR) 300 UNIT/ML Solution Pen-injector Inject 18-25 Units under the skin every day.    Probiotic Product (PROBIOTIC DAILY) Cap Take  by mouth. Floaster    Potassium Chloride CR (MICRO-K) 8 MEQ Cap CR capsule TAKE 1 CAPSULE BY MOUTH EVERY 48 HOURS    clopidogrel (PLAVIX) 75 MG Tab Take 1 Tablet by mouth every day.    atorvastatin (LIPITOR) 80 MG tablet Take 1 Tablet by mouth every evening.    HUMALOG  KWIKPEN 100 UNIT/ML Solution Pen-injector injection PEN Inject 5 Units under the skin 3 times a day before meals. Humalog 5 units for sugars between 101-150, increase by 2 units if greater than 150.  Correction dosage is 2:50 greater than 150    BOTOX 200 units Recon Soln injection     fluoxetine (PROZAC) 40 MG capsule Take 1 Capsule by mouth every day.    amLODIPine (NORVASC) 10 MG Tab Take 1 Tablet by mouth every day.    carvedilol (COREG) 3.125 MG Tab Take 1 Tablet by mouth 2 times a day with meals.    hydrALAZINE (APRESOLINE) 10 MG Tab Take 1 Tablet by mouth 3 times a day as needed (SBP >160).    finasteride (PROSCAR) 5 MG Tab Take 5 mg by mouth every evening.    Multiple Vitamin (MULTIVITAMIN PO) Take 1 Tablet by mouth every morning.    Cholecalciferol (VITAMIN D) 2000 UNIT Tab Take 2,000 Units by mouth every morning.    magnesium oxide (MAG-OX) 400 MG Tab tablet Take 800 mg by mouth 2 times a day.    omeprazole (PRILOSEC) 20 MG delayed-release capsule Take 1 Capsule by mouth every day.    acetaminophen (TYLENOL) 325 MG Tab Take 2 Tablets by mouth every 6 hours as needed for Mild Pain, Moderate Pain or Fever.    ezetimibe (ZETIA) 10 MG Tab Take 1 Tablet by mouth every day. (Patient not taking: Reported on 11/14/2023)    glucose blood (ONETOUCH ULTRA) strip 1 Strip by Other route 3 times a day.    Insulin Pen Needle (PEN NEEDLES) 32G X 4 MM Misc 1 Each 5 Times a Day. USE FOR INSULIN SHOTS FIVE TIMES DAILY         Review of Systems   Respiratory:  Negative for cough and shortness of breath.    Cardiovascular:  Negative for chest pain, palpitations, orthopnea, leg swelling and PND.   Neurological:  Negative for dizziness and loss of consciousness.          Objective:   /82 (BP Location: Right arm, Patient Position: Sitting)   Pulse 73   Ht 1.829 m (6')   Wt 83.9 kg (185 lb)  Body mass index is 25.09 kg/m².         Physical Exam  Vitals (vitals signs taken by patient at home) reviewed.   Constitutional:        General: He is not in acute distress.  HENT:      Head: Normocephalic and atraumatic.   Pulmonary:      Effort: No respiratory distress.   Skin:     Coloration: Skin is not pale.   Psychiatric:         Behavior: Behavior normal.         Judgment: Judgment normal.            Assessment:     1. ([facility]) Hypertension associated with diabetes  olmesartan (BENICAR) 40 MG Tab    Basic Metabolic Panel      2. Hypomagnesemia  MAGNESIUM      3. Essential hypertension, benign        4. Age-related physical debility        5. Dyslipidemia        6. Coronary artery disease involving native coronary artery of native heart without angina pectoris        7. Type 2 diabetes mellitus with stage 3b chronic kidney disease, with long-term current use of insulin ([facility])             Medical Decision Making:  Today's Assessment / Plan:   Dyslipidemia  CAD s/p remote stenting to  RCA, LAD, OM   -last in 2009.   - no angina symptoms, is mostly in wheel chair, although does work with PT.   -Continue clopidogrel 75  - LDL is above goal of 70 with high dose atorva.  He did not tolerate the addition of ezetimibe.  At this point he is not interested in any further cholesterol medication such as bempedoic acid or PCSK9 inhibitors as he is concerned about polypharmacy in his poor tolerance to many medications.       Essential hypertension, benign  -Blood pressures remain above goal.  We can try a stronger acting ARB to achieve a goal blood pressure of less than 130/80 given his CAD, CKD, CVA history  - check BMP in 2 weeks after medication change to assess for electrolyte derangements  -Follow-up every 6 months     Paroxysmal atrial fibrillation in setting of sepsis/hospitalization   - ?single documented episode, I went back in his chart to 2017 and did not find if this was in conjunction with his CVA/TIA  - see Dr. Tucker's note for previous discussion of anticoagulation/GI bleeds  -continue carvedilol (was previously on metoprolol)     CKDstage  3  -followed by Dr. Jarvis.       Cerebrovascular accident (CVA) due to embolism of cerebral artery   - single antiplatelet agent, statin therapy, blood pressure control     PAD without claudication/history of nonhealing wounds, resolved  -continue antiplatelet/statin     Hypomagnesemia  -Recheck magnesium with next lab     Change ARB to olmesartan.  BMP/mag and follow-up with BP numbers.    Return in about 6 months (around 5/14/2024) for follow up with Dr. Tucker.

## 2023-11-14 ENCOUNTER — TELEMEDICINE (OUTPATIENT)
Dept: CARDIOLOGY | Facility: MEDICAL CENTER | Age: 78
End: 2023-11-14
Attending: PHYSICIAN ASSISTANT
Payer: MEDICARE

## 2023-11-14 VITALS
BODY MASS INDEX: 25.06 KG/M2 | HEIGHT: 72 IN | WEIGHT: 185 LBS | DIASTOLIC BLOOD PRESSURE: 82 MMHG | HEART RATE: 73 BPM | SYSTOLIC BLOOD PRESSURE: 135 MMHG

## 2023-11-14 DIAGNOSIS — Z79.4 TYPE 2 DIABETES MELLITUS WITH STAGE 3B CHRONIC KIDNEY DISEASE, WITH LONG-TERM CURRENT USE OF INSULIN (HCC): ICD-10-CM

## 2023-11-14 DIAGNOSIS — R54 AGE-RELATED PHYSICAL DEBILITY: ICD-10-CM

## 2023-11-14 DIAGNOSIS — E11.22 TYPE 2 DIABETES MELLITUS WITH STAGE 3B CHRONIC KIDNEY DISEASE, WITH LONG-TERM CURRENT USE OF INSULIN (HCC): ICD-10-CM

## 2023-11-14 DIAGNOSIS — I10 ESSENTIAL HYPERTENSION, BENIGN: ICD-10-CM

## 2023-11-14 DIAGNOSIS — E78.5 DYSLIPIDEMIA: ICD-10-CM

## 2023-11-14 DIAGNOSIS — E11.59 HYPERTENSION ASSOCIATED WITH DIABETES (HCC): ICD-10-CM

## 2023-11-14 DIAGNOSIS — E83.42 HYPOMAGNESEMIA: ICD-10-CM

## 2023-11-14 DIAGNOSIS — I15.2 HYPERTENSION ASSOCIATED WITH DIABETES (HCC): ICD-10-CM

## 2023-11-14 DIAGNOSIS — N18.32 TYPE 2 DIABETES MELLITUS WITH STAGE 3B CHRONIC KIDNEY DISEASE, WITH LONG-TERM CURRENT USE OF INSULIN (HCC): ICD-10-CM

## 2023-11-14 DIAGNOSIS — I25.10 CORONARY ARTERY DISEASE INVOLVING NATIVE CORONARY ARTERY OF NATIVE HEART WITHOUT ANGINA PECTORIS: ICD-10-CM

## 2023-11-14 PROCEDURE — 99214 OFFICE O/P EST MOD 30 MIN: CPT | Mod: 95 | Performed by: PHYSICIAN ASSISTANT

## 2023-11-14 RX ORDER — OLMESARTAN MEDOXOMIL 40 MG/1
40 TABLET ORAL EVERY EVENING
Qty: 90 TABLET | Refills: 3 | Status: SHIPPED | OUTPATIENT
Start: 2023-11-14

## 2023-11-14 RX ORDER — EZETIMIBE 10 MG/1
1 TABLET ORAL
COMMUNITY
End: 2024-02-27

## 2023-11-14 RX ORDER — PENICILLIN V POTASSIUM 500 MG/1
TABLET ORAL
COMMUNITY
Start: 2023-10-11 | End: 2023-11-14

## 2023-11-14 ASSESSMENT — ENCOUNTER SYMPTOMS
SHORTNESS OF BREATH: 0
ORTHOPNEA: 0
DIZZINESS: 0
COUGH: 0
PALPITATIONS: 0
LOSS OF CONSCIOUSNESS: 0
PND: 0

## 2023-11-14 ASSESSMENT — FIBROSIS 4 INDEX: FIB4 SCORE: 2.11

## 2023-11-19 DIAGNOSIS — I10 ESSENTIAL HYPERTENSION, BENIGN: ICD-10-CM

## 2023-11-21 RX ORDER — CARVEDILOL 3.12 MG/1
3.12 TABLET ORAL 2 TIMES DAILY WITH MEALS
Qty: 180 TABLET | Refills: 3 | Status: SHIPPED | OUTPATIENT
Start: 2023-11-21 | End: 2023-12-06 | Stop reason: SDUPTHER

## 2023-11-28 ENCOUNTER — APPOINTMENT (OUTPATIENT)
Dept: NEUROLOGY | Facility: MEDICAL CENTER | Age: 78
End: 2023-11-28
Attending: PHYSICAL MEDICINE & REHABILITATION
Payer: MEDICARE

## 2023-11-29 ENCOUNTER — PATIENT MESSAGE (OUTPATIENT)
Dept: HEALTH INFORMATION MANAGEMENT | Facility: OTHER | Age: 78
End: 2023-11-29

## 2023-11-29 DIAGNOSIS — I10 ESSENTIAL HYPERTENSION, BENIGN: ICD-10-CM

## 2023-12-01 ENCOUNTER — HOSPITAL ENCOUNTER (OUTPATIENT)
Dept: LAB | Facility: MEDICAL CENTER | Age: 78
End: 2023-12-01
Attending: PHYSICIAN ASSISTANT
Payer: MEDICARE

## 2023-12-01 DIAGNOSIS — E83.42 HYPOMAGNESEMIA: ICD-10-CM

## 2023-12-01 DIAGNOSIS — I15.2 HYPERTENSION ASSOCIATED WITH DIABETES (HCC): ICD-10-CM

## 2023-12-01 DIAGNOSIS — E11.59 HYPERTENSION ASSOCIATED WITH DIABETES (HCC): ICD-10-CM

## 2023-12-01 PROCEDURE — 83735 ASSAY OF MAGNESIUM: CPT

## 2023-12-01 PROCEDURE — 36415 COLL VENOUS BLD VENIPUNCTURE: CPT

## 2023-12-01 PROCEDURE — 80048 BASIC METABOLIC PNL TOTAL CA: CPT

## 2023-12-02 LAB
ANION GAP SERPL CALC-SCNC: 12 MMOL/L (ref 7–16)
BUN SERPL-MCNC: 23 MG/DL (ref 8–22)
CALCIUM SERPL-MCNC: 8.8 MG/DL (ref 8.5–10.5)
CHLORIDE SERPL-SCNC: 102 MMOL/L (ref 96–112)
CO2 SERPL-SCNC: 24 MMOL/L (ref 20–33)
CREAT SERPL-MCNC: 1.1 MG/DL (ref 0.5–1.4)
GFR SERPLBLD CREATININE-BSD FMLA CKD-EPI: 69 ML/MIN/1.73 M 2
GLUCOSE SERPL-MCNC: 218 MG/DL (ref 65–99)
MAGNESIUM SERPL-MCNC: 1.3 MG/DL (ref 1.5–2.5)
POTASSIUM SERPL-SCNC: 4 MMOL/L (ref 3.6–5.5)
SODIUM SERPL-SCNC: 138 MMOL/L (ref 135–145)

## 2023-12-02 NOTE — TELEPHONE ENCOUNTER
Is the patient due for a refill? Yes    Was the patient seen the past year? Yes    Date of last office visit: 5/3/2023    Does the patient have an upcoming appointment?  Yes   If yes, When? 5/7/23    Provider to refill:ROSA    Does the patients insurance require a 100 day supply?  No

## 2023-12-04 ENCOUNTER — PATIENT MESSAGE (OUTPATIENT)
Dept: CARDIOLOGY | Facility: MEDICAL CENTER | Age: 78
End: 2023-12-04
Payer: MEDICARE

## 2023-12-04 ENCOUNTER — TELEPHONE (OUTPATIENT)
Dept: MEDICAL GROUP | Facility: PHYSICIAN GROUP | Age: 78
End: 2023-12-04
Payer: MEDICARE

## 2023-12-04 DIAGNOSIS — E11.69 TYPE 2 DIABETES MELLITUS WITH OTHER SPECIFIED COMPLICATION, WITH LONG-TERM CURRENT USE OF INSULIN (HCC): ICD-10-CM

## 2023-12-04 DIAGNOSIS — Z79.4 TYPE 2 DIABETES MELLITUS WITH OTHER SPECIFIED COMPLICATION, WITH LONG-TERM CURRENT USE OF INSULIN (HCC): ICD-10-CM

## 2023-12-04 RX ORDER — PEN NEEDLE, DIABETIC 30 GX3/16"
1 NEEDLE, DISPOSABLE MISCELLANEOUS
Qty: 500 EACH | Refills: 3 | Status: SHIPPED | OUTPATIENT
Start: 2023-12-04

## 2023-12-04 RX ORDER — AMLODIPINE BESYLATE 10 MG/1
10 TABLET ORAL DAILY
Qty: 90 TABLET | Refills: 3 | Status: SHIPPED | OUTPATIENT
Start: 2023-12-04

## 2023-12-04 NOTE — RESULT ENCOUNTER NOTE
Av,   Your labs look stable. How is the new Olmesartan and how are your blood pressures?     Your magnesium continues to be low, I'm not sure how much you are absorbing the mag oxide. You may want to consult with your pharmacist about changing to a different formulation (I think we talked about slow- Mag before). Please take the magnesium at night and the omeprazole in the morning.

## 2023-12-04 NOTE — TELEPHONE ENCOUNTER
Received request via: Patient    Was the patient seen in the last year in this department? Yes    Does the patient have an active prescription (recently filled or refills available) for medication(s) requested? No    Does the patient have assisted Plus and need 100 day supply (blood pressure, diabetes and cholesterol meds only)? Yes, quantity updated to 100 days and Medication is not for cholesterol, blood pressure or diabetes

## 2023-12-05 DIAGNOSIS — Z86.73 HISTORY OF CEREBROVASCULAR ACCIDENT (CVA) IN ADULTHOOD: ICD-10-CM

## 2023-12-06 DIAGNOSIS — I10 ESSENTIAL HYPERTENSION, BENIGN: ICD-10-CM

## 2023-12-06 RX ORDER — CARVEDILOL 6.25 MG/1
6.25 TABLET ORAL 2 TIMES DAILY WITH MEALS
Qty: 180 TABLET | Refills: 3 | Status: SHIPPED | OUTPATIENT
Start: 2023-12-06

## 2023-12-10 NOTE — PROGRESS NOTES
[facility], [place]  [facility]   [address], [address] [place] [address]  Ph: [phone]    SPASTICITY CLINIC    Patient Name: Av Bob   Patient : [id number]  Patient Age: 78 y.o.   PCP: Madison Bunch D.O.    Referring Physician: No ref. provider found   Reason for Referral: Spasticity Management   Examining Physician: Brett Cantrell DO  Date of Service: 12/10/2023      Telemedicine Video Visit: Established Patient   This Remote Face to Face encounter was conducted via Zoom. The patient was in their home in the [facility].  Given the patient's difficulty with transportation, I am providing medical care to this patient via audio/video visit in place of an in person visit at the request of the patient. Verbal consent to telehealth, risks, benefits, and consequences were discussed. Patient retains the right to withdraw at any time. All existing confidentiality protections apply. The patient has access to all transmitted medical information. No dissemination of any patient images or information to other entities without further written consent.    SUBJECTIVE:   Patient Identification: Av Bob is a 78 y.o. 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 Huff from 22 until 23.     Accompanied by Today: Wife, Usha, assists with history.     Procedures:  2020 right L3-4 and L4-5 transforaminal epidural steroid injection 95% pain relief and follow-up visit Dr Bennett  3/29/2021 right L3-4 and L4-5 transforaminal epidural steroid injection 90%  improved postprocedure.  Dr Bennett    History of Present Illness:      Today, 12/14/2023 since last visit/injection, patient has reported same improvement.  Same difficulty with ADLs.  AFO L when exercises and ambulates. No wrist splint and discussed with PT/OT and Dr Huff. No skin breakdown. Infrequent constipation but lately easy bowel movements.  No recent infection or new stressors.  The patient denies any fevers, chills, chest pain or shortness of breath. Never slept well at night and sleep during the day and uses TV.   Prior evaluation with Dr Bennett with Gabapentin and effective for neuropathic pain.  Received prior epidural steroid injections but no active back pain at this time.      3/27/23 Botox performed 2/13/23. Addressed bicep and pec, and hamstring. He has a lot more lateral movement given that we did the pec. This has made it easier to get dressed. The left leg feels better. When he stands he can put the put fully on the floor. He is still doing exercises with the therapist. The Botox helped without a doubt. He received a letter from insurance pharmacy benefit saying that the authorization is expiring. He definitely feels better. They are hoping that with this round of Botox and more therapy he will begin to make some progress in taking steps.     8/29/22  - Seroquel had been inefficacious in terms of not helping with agitation at night.   - Sometimes Trazodone helps and sometimes it doesn't.   - Went to ED for AMS and held on antibiotics.   - That night he slept well through the night at ED and up until last night.   - Last night he did get up and sat at end of bed but was unable to get back into bed.   - He didn't sleep from 1 AM to 5 AM and then son came to help him get back in bed.   - Last night he was uncomfortable but didn't seem agitated.   - Labs reviewed - has candidal infection.   - Has guard rails on one side of the bed.   - ED recommended Trazodone.   - QTc reviewed 477 1/2022  - Has  some lab questions.   - Is having some left arm pain only when moving it.      8/10/22  - The elbow is not bothering him at all.   - He has not had the xray done.   - He can extend his elbow a lot better than he had.   - He is doing well with the UE and the   - Having agitation and anxiety at night. Has been given Trazodone occasionally and up until last night that helped him sleep. Since being home has only needed about 4 times.   - Last night he had it and he was awake most of the night and woke Usha up every 2 hours with different pain complaints.   - Everything was okay this morning in terms of no pain complaints. He was awake at about 11.   - Taking Gabapentin 200 mg at bed.   - Does get cramps at night which bothers him.   - Occasionally has used Tramadol for pain, but that is infrequent.   - Tylenol helps with pain at night.   - Has been about a week that he has been seemingly agitated and with anxiety.     6/29/22  - The elbow is not bothering him at all.   - He has not had the xray done.   - He can extend his elbow a lot better than he had.   - He is doing well with the UE and the   - Having agitation and anxiety at night. Has been given Trazodone occasionally and up until last night that helped him sleep. Since being home has only needed about 4 times.   - Last night he had it and he was awake most of the night and woke Usha up every 2 hours with different pain complaints.   - Everything was okay this morning in terms of no pain complaints. He was awake at about 11.   - Taking Gabapentin 200 mg at bed.   - Does get cramps at night which bothers him.   - Occasionally has used Tramadol for pain, but that is infrequent.   - Tylenol helps with pain at night.   - Has been about a week that he has been seemingly agitated and with anxiety.     6/21/21  -Patient presents today for Botox follow-up.  -States that he does notice that a little looser.  -Wife states that Occupational Therapy is does note that his little  looser is well.  -Has not had physical therapy yet, will be going this week for the first time.  -Cognitively doing better off of spasticity agents which have negative cognitive side effects.  -Has had some continued back pain for which he takes gabapentin at night.  Gabapentin makes him feel little bit sluggish the next day.  -States that he bruises easily, is on antiplatelets, however this seems somewhat new.  Follows up with primary care shortly.    1/7/21  - Records Reviewed: S/p R TESI L3-4, L4-5 11/2/20 - patient reported significant improvement 11/23/20.  - No new hospitalizations.   - TESI is still working well. Occasionally has a twinge, but overall improved.   - Patient had immediate relief.   - Sleeping improved, moving better.   - Spasticity continues to be an issue. Has stretch band that he uses to stretch at night.   - States doesn't really have any pain in right arm.   - Saw Urology. PVR was 25cc. Still on Proscar, Hipprex, alfuzosin.  - Performing exercises twice weekly. [facility] Gets exercise therapist as well as PT, but hasnt re-started PT since COVID.   -Does perform stretching of the lower extremity daily.  -Patient has not driven since he had his stroke 4 years ago.  He actually really would like to see if he could return to driving.    HPI from note dated 9/14/2020: Patient has a history of right CVA with residual left hemiparesis 2016, non-Hodgkin's lymphoma who presented for right back, hip, leg pain and paresthesias.  This has happened a couple of times in the past.  The first time was in 2005 when he went to a high school reunion and was sitting in the bleachers for 3 hours.  At that time he did get an MRI of his lumbar spine and had had a remote history of injections prior to that.  He states the injections to his back helped him significantly, he does not remember the provider that did them.  This symptomatology occurred again when he was driving back from the [place] area about  18 months ago. Got out of car in [place] and when got out to stretch legs that when it started hurting.  States it took many months for this to heal.  It finally did and had been pain free for about a year until 3 weeks ago when flared again. Starts in low back on right, down into hip. Also states has lower leg cramping and foot cramping. Pain travels from R low back laterally into hip, posteriorly down proximal leg, posteriorly with cramping in lower leg, and right foot falls asleep. Cannot target specifically where numbness is.  States that numbness in right lower extremity only worsened with the acute onset of his back pain 3 weeks ago.  Might have intermittently had numbness in the past but significantly increased but this acute onset of low back pain 3 weeks ago. Percentage scale: 50% in back and 50% in leg. Mostly leg bothers him at night. Saw PCP and removed statin and increased Tylenol 1000mg TID. Sometimes the foot feels like it is falling asleep.  Complicating the clinical picture is that the patient has PVD in the right, declined angioplasty in the past. Patient states that he has no saddle anesthesia.  He states that his bowel movements are regular.  He states that he does have difficulty with urination in the sense that he wears adult briefs and sometimes urinates and has no idea he is doing so.  He does have history of BPH and is on bladder medications.     9/28/20   -On today's visit patient states that when he was last asked if he could pinpoint a specific pattern within the foot where he feels numbness he states that it is mostly in the great toe.    -There has been no significant events since we last visited a couple weeks ago.  - He continues to use a nonstandard wheelchair for locomotion primarily.    Current Spasticity Medications and Dosages:  none    Past Spasticity Medications and Dosages:  Baclofen d/t cognitive effects    Previous Spasticity Injection History:  8/24/23  Botox: left upper  extremity + lower extremity  Location Botox Amount in Units   Pec sonya and minor 150 units total (3 locations)   Biceps    200 units (4 locations)   Hamstring - semimembranosus 150 units (3 locations)   Hamstring - semitendinosus  150 units (3 locations)   Hamstring - Bicep femoris long head and short head 150 units (3 locations)   Total Units  800 units         200 units of SAMPLES used to get better movement from PEC and HAMSTRING    5/25/23, 2/13/23  Botox: left upper extremity + lower extremity  Location Botox Amount in Units   Pec sonya and minor 50 units each muscle (100 total)   Biceps    200 units (4 locations)   Hamstring - semimembranosus 100 units (2 locations)   Hamstring - semitendinosus  100 units (2 locations)   Hamstring - Bicep femoris long head and short head 100 units (2 locations)   Total Units  600 units      10/24/22, 6/29/22  Botox: left upper extremity  Location Botox Amount in Units   Biceps    400 units   Total Units  400 units       8/12/21:   Botox: left upper extremity  Location Botox Amount in Units   Biceps    50 units   FCR  75 units   FCU  75 units   Pronator teres   50 units   FDS   75 units   FDP   75 units   Total Units  400 units      5/11/21  Botox: left upper extremity and left lower extremity  Location Botox Amount in Units   Left semimembranosus  75 units   Left semitendinosus  75 units   Left biceps femoris  75 units   Left bicep  75 units   Left FCR  50 units   Left FCU  50 units   Total Units  400 units        Review of Systems:  Red Flags ROS:   Fever, Chills, Sweats: Denies  Involuntary Weight Loss: Denies  Bladder Incontinence: Denies  Bowel Incontinence: denies  Saddle Anesthesia: Denies  Falls: denies  progressive motor weakness: denies  All other systems reviewed and negative.     Past Medical History:  Past Medical History:   Diagnosis Date    Dyschezia 9/22/2022    For the past 6 months or so he has been experiencing significant pain with defecation.  Does not happen  every time.  His wife who is his primary caregiver notes there is no hard stool when he has a painful episodes.  He also is having stool incontinence sometimes when he coughs and other times he will pass a full bowel movement and not be aware.  He has not yet seen GI for this problem.    Hospital discharge follow-up 6/27/2022    Av was admitted to the hospital from 5/28 to 5/31/2022 for weakness and sepsis related to urinary tract infection.  He was discharged to the [facility] rehab for 3 weeks to try to improve mobility and ADLs due to prior stroke.  This was complicated by C. difficile infection as well as lower extremity DVTs.  Diarrhea resolved with oral vancomycin.  Xarelto was changed from prophylaxis to treatment     Acute deep vein thrombosis (DVT) of RLE and partial DVT of LLE 6/19/2022    US LE (6/2022):   1.  Acute appearing RIGHT posterior tibial vein DVT.  2.  Subacute appearing LEFT common femoral vein DVT and proximal femoral   vein junction DVT.   He had leg pain and ultrasound of bilateral lower extremities was completed which was positive for new DVT, acute in the right posterior tibial vein and subacute in the left common femoral and proximal femoral vein junction as noted    UTI (urinary tract infection) 5/28/2022    Pyelonephritis 5/24/2022    Av had abrupt onset of illness starting on Sunday.  He felt unwell and then had projectile vomiting x3 episodes after dinner that evening.  He had associated nausea but no overt abdominal pain.  He has continued to have anorexia and is having difficulty with his p.o. intake.  He did get in some fruit in a month and yesterday and about 50 to 60 ounces of water.  He has chronic urinary retention    Acute prostatitis 1/13/2022    New problem of prostatitis identified by [facility] when patient developed hematuria with rectal pain.  He followed up with his urology PA and was placed back on Bactrim.  He was recently on Bactrim and has a history of  recurrent urinary tract infections related to neurogenic bladder from prior stroke.  Rectal pain is dissipated however he continues to have hematuria.  He had associated CATA o    Exposure to SARS-associated coronavirus 10/13/2021    He reports viral illness last week with runny nose, congestion, productive cough, and wheezing.  He went to a play of his grandson and may have been exposed to Covid.  He was feeling very bad last Friday and over the weekend and now is at least 50% better.    Hordeolum externum of left lower eyelid 9/14/2021    He reports to clinic with 3 weeks of erythema and pain of the left lower eyelid. No clear inciting cause. Reports no globe pain. Lower > upper eyelid swelling and erythema. After 1.5 weeks had drainage of purulence from the lower medial eyelid. No photosensitivity. Using warm compresses. No changes in visual acuity. Saw retinal specialist around day 1 or 2 of symptoms who felt it could be related     Toenail avulsion, initial encounter- left foot 3rd digit 7/12/2021    They report that his toenail spontaneously came off last week.  He does not recall specific injury or any pain.  His significant other saw the nail on the floor with dried blood on his foot.  She cleaned the wound and covered it with gauze.  On follow-up in clinic today the gauze has stuck to the wound bed but with saline was fairly easy to remove the dried gauze.  There was scant amount of bright    Pain 06/30/2021    right leg foot    Hypertension 06/30/2021    pt states well controlled on meds    Dysphagia 3/15/2021    He reports progressive worsening of his swallow function.  He notices at least once per week he is choking and coughing, can be with pills or can be with food.  The episodes are quite frightening and he takes a bit of time for him to recover.  He has a prior history of stroke and recalls working with speech-language pathology at that time but does not remember if he did any formal esophagrams or s     Roberto hematuria 1/29/2020    Renal cyst 1/29/2020    Influenza A 1/26/2020    Leg edema, right 1/22/2020    Acute on chronic renal failure (HCC) 1/21/2020    Renal mass 1/21/2020    Hydronephrosis 1/20/2020    History of renal calculi 11/19/2019    Diarrhea 7/2/2019    Acute cystitis without hematuria 6/30/2019    Muscle strain of right gluteal region 5/20/2019    Left hand weakness 5/20/2019    ([facility]) Chronic ulcer of right lower extremity with fat layer exposed 12/27/2018    Enterococcal septicemia (HCC) 8/12/2017    Hypomagnesemia 08/12/2017    etiology uncertain    NSTEMI (non-ST elevated myocardial infarction) ([facility]) 07/18/2017    complicating UTI with sepsis    Acute respiratory failure with hypoxia ([facility]) 5/20/2017    Septic shock ([facility]) 5/20/2017    Normocytic hypochromic anemia 5/20/2017    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 [1]  He has a history of chronic kidney disease related to nephrolithiasis, recurrent UTIs, and BPH as well as history of hypertension and diabetes.  He is following with nephrology, Dr. Jarvis and urology, Dr. Barry.  Spironolactone was discontinued due     Stroke ([facility]) 2016    left sided weakness    Skin ulcer of calf (HCC) 2015    Right, Dr. Terry and wound care    Controlled gout 2014    Polyneuropathy in diabetes(357.2) 9/11/2013    Hypokalemia 2012    controlled with combination of ACE inhibitor or ARB plus spironolactone    Wound of left leg 2012     Requiring surgery and debridment, Dr. Moore    Nephrolithiasis 2006    right kidney subsequent lithotripsy by Dr. Barry    CAD (coronary artery disease)     GIOVANNA to RCA in '97, GIOVANNA X2 to LAD and GIOVANNA X2 to OM in '09    Cancer ([facility])     2017; chemo lympoma non hodgkins lymphoma    Cataract     dez iol    Chickenpox     CKD (chronic kidney disease) stage 3, GFR 30-59 ml/min ([facility])     Coronary atherosclerosis of native coronary artery     S/P PTCA (percutaneous transluminal coronary angioplasty), RCA, 5/1997, patent on cath 7/10/2009 at the time of interventions on his left anterior descending and circumflex coronary arteries    Daytime sleepiness     Depression     Diabetes ([facility])     Difficulty swallowing     Frequent urination     GERD (gastroesophageal reflux disease)     Welsh measles     Insomnia     Kidney stone     Mixed hyperlipidemia     Peripheral vascular disease ([facility])     Urinary bladder disorder     Urinary incontinence     pt wears pads    Weakness       Allergies   Allergen Reactions    Diphenhydramine Anxiety     Pt is able to tolerate  Mg benadryl with less anxiety    Lorazepam Unspecified     Disorientation    Spironolactone Unspecified     Acute kidney injury    Ciprofloxacin Rash     Rash,stomach ache        Past Social History:  Social History     Socioeconomic History    Marital status:      Spouse name: Not on file    Number of children: Not on file    Years of education: Not on file    Highest education level: Not on file   Occupational History    Not on file   Tobacco Use    Smoking status: Never    Smokeless tobacco: Never   Vaping Use    Vaping Use: Never used   Substance and Sexual Activity    Alcohol use: Never    Drug use: Never    Sexual activity: Not Currently     Partners: Female     Comment: , one daughter, 2 grands   Other Topics Concern     Service Yes    Blood Transfusions No    Caffeine Concern No    Occupational Exposure No    Hobby Hazards No    Sleep  Concern Yes    Stress Concern No    Weight Concern No    Special Diet No    Back Care No    Exercise Yes    Bike Helmet No    Seat Belt Yes    Self-Exams Yes   Social History Narrative    Av is from [place], [place] and raised in [place]. He has been in [place] since 2004. 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  He has been  to Usha since 2003, they met through a mutual friend. He has a daughter (Kalina) and a step son (Jimi).     Social Determinants of Health     Financial Resource Strain: Low Risk  (7/25/2022)    Overall Financial Resource Strain (CARDIA)     Difficulty of Paying Living Expenses: Not hard at all   Food Insecurity: No Food Insecurity (7/25/2022)    Hunger Vital Sign     Worried About Running Out of Food in the Last Year: Never true     Ran Out of Food in the Last Year: Never true   Transportation Needs: No Transportation Needs (3/14/2023)    PRAPARE - Transportation     Lack of Transportation (Medical): No     Lack of Transportation (Non-Medical): No   Physical Activity: Inactive (7/25/2022)    Exercise Vital Sign     Days of Exercise per Week: 0 days     Minutes of Exercise per Session: 0 min   Stress: No Stress Concern Present (7/25/2022)    [employer] [place] - Occupational Stress Questionnaire     Feeling of Stress : Only a little   Social Connections: Moderately Isolated (3/14/2023)    Social Connection and Isolation Panel [NHANES]     Frequency of Communication with Friends and Family: Once a week     Frequency of Social Gatherings with Friends and Family: Twice a week     Attends Gnosticist Services: Never     Active Member of Clubs or Organizations: No     Attends Club or Organization Meetings: Never     Marital Status:     Intimate Partner Violence: Not on file   Housing Stability: Low Risk  (3/14/2023)    Housing Stability Vital Sign     Unable to Pay for Housing in the Last Year: No     Number of Places Lived in the Last Year: 1     Unstable Housing in the Last Year: No        Family History:  Family History   Problem Relation Age of Onset    Heart Disease Father         CAD    Diabetes Father     Cancer Mother     Psychiatric Illness Mother         Depression    Depression Mother     Kidney stones Brother     Heart Disease Brother     Psychiatric Illness Brother         Depression    Depression Brother     Suicide Attempts Other     Psychiatric Illness Other         autism         OBJECTIVE:   Vital Signs:  Virtual visit    Physical Exam:   Body Habitus: There is no height or weight on file to calculate BMI. Virtual Visit  Appearance: Well-groomed, well-nourished, not disheveled  Eyes: No scleral icterus to suggest severe liver disease, no proptosis to suggest severe hyperthyroid  ENT -no obvious auditory deficits, no external lesions, moist mucus membranes   Skin - bandaids on his knee for safety.  no rashes or lesions noted. No appreciable skin breakdown on exposed skin areas.    Respiratory-  breathing comfortably on room air, no audible wheezing, full sentences  Cardiovascular- No lower extremity edema noted.   Psychiatric- alert and oriented,  calm, comfortable, cooperative   Gait - video;   Neuromuscular- Awake alert.  Conversational.  Logical thought content.  Botox has worn off. His  spasticity has returned.  Difficult to see if L Foot Drop.  L Wrist drop      Tone on Modified Deuce Scale    L R  R L   Elbow extension (testing tone of elbow flexors) 3   Hip extension (testing hip flexors)     Elbow flexion (testing tone of elbow extensors) 0  Hip abduction (testing adductors)     Wrist extension (testing tone of wrist flexors)  3  Knee extension (testing knee flexors)  3   Finger extension (testing tone of finger  flexors) 1  Knee flexion (testing knee extensors)  1   Supination (testing forearm pronators) 3  Dorsiflexion (testing plantarflexors)  0      Plantarflexion (testing dorsiflexors)  1         Pertinent Labs:  Lab Results   Component Value Date/Time    SODIUM 138 12/01/2023 12:52 PM    POTASSIUM 4.0 12/01/2023 12:52 PM    CHLORIDE 102 12/01/2023 12:52 PM    CO2 24 12/01/2023 12:52 PM    GLUCOSE 218 (H) 12/01/2023 12:52 PM    BUN 23 (H) 12/01/2023 12:52 PM    CREATININE 1.10 12/01/2023 12:52 PM    CREATININE 1.4 05/28/2008 05:42 PM    BUNCREATRAT 22.0 03/01/2022 02:22 PM    BUNCREATRAT 10 03/27/2017 02:11 PM       Lab Results   Component Value Date/Time    WBC 10.1 09/30/2023 10:59 AM    RBC 5.00 09/30/2023 10:59 AM    HEMOGLOBIN 14.0 09/30/2023 10:59 AM    HEMATOCRIT 43.3 09/30/2023 10:59 AM    MCV 86.6 09/30/2023 10:59 AM    MCH 28.0 09/30/2023 10:59 AM    MCHC 32.3 09/30/2023 10:59 AM    MPV 10.2 09/30/2023 10:59 AM    NEUTSPOLYS 81.10 (H) 08/25/2022 01:47 PM    LYMPHOCYTES 9.50 (L) 08/25/2022 01:47 PM    MONOCYTES 6.10 08/25/2022 01:47 PM    EOSINOPHILS 2.10 08/25/2022 01:47 PM    BASOPHILS 0.30 08/25/2022 01:47 PM    HYPOCHROMIA 1+ 03/21/2012 01:08 PM       Lab Results   Component Value Date/Time    ASTSGOT 37 01/04/2023 02:39 PM    ALTSGPT 26 01/04/2023 02:39 PM       Imaging:   I personally reviewed following images, these are my reads  CT head without contrast 11/20/2021: no hemorrhage or evidence for acute infarct.     IMAGING radiology reads. I reviewed the following radiology reads       Results for orders placed during the hospital encounter of 11/20/18    MR-BRAIN-W/O    Impression  1.  Moderate cerebral atrophy.  2.  Old infarcts left head of caudate nucleus and left frontal corona radiata.  3.  Mild supratentorial white matter disease most consistent with microvascular ischemic change.  4.  Tiny curvilinear focus of encephalomalacia in the right anterior quadrant of the medulla most consistent with an  old lacunar size brainstem infarct.  5.  Mild pontine ischemic gliosis.  6.  No evidence of acute infarction, acute hemorrhage, or mass lesion.      Results for orders placed during the hospital encounter of 11/11/23    MR-ABDOMEN-WITH & W/O    Impression  1.  Cyst which is decreased in size lateral upper pole left kidney region with surrounding complex fluid appears nearly completely resolved. This most likely represented a ruptured exophytic cyst arising laterally from the upper pole of the left kidney.  Surrounding hemorrhage seen on the prior CT appears nearly completely resolved..    2.  There is no evidence of significant complex cyst or cysts containing enhancement or solid component on the current exam.    3.  No solid mass or enhancing mass is identified.    4.  No change in moderate bilateral hydronephrosis and hydroureter.    5.  No adenopathy identified.             Results for orders placed during the hospital encounter of 09/24/20    MR-LUMBAR SPINE-W/O    Impression  1.  Multifocal degenerative disease in the lumbar spine as described above.  2.  Bilateral hydronephrosis and hydroureter. This is noted on the previous CT scan dated 1/21/2020.  3.  There is small focus of sclerosis in the L4 vertebral body. This is new since the previous study. This finding likely secondary to the degeneration. However if there is a history of carcinoma the possibility of sclerotic metastasis cannot be  excluded.        Results for orders placed during the hospital encounter of 09/24/20    MR-LUMBAR SPINE-W/O    Impression  1.  Multifocal degenerative disease in the lumbar spine as described above.  2.  Bilateral hydronephrosis and hydroureter. This is noted on the previous CT scan dated 1/21/2020.  3.  There is small focus of sclerosis in the L4 vertebral body. This is new since the previous study. This finding likely secondary to the degeneration. However if there is a history of carcinoma the possibility of sclerotic  metastasis cannot be  excluded.                                          Results for orders placed in visit on 07/23/20    DX-CHEST-2 VIEWS    Impression  No acute cardiopulmonary disease.                                             ASSESSMENT/PLAN: Av Bob  is a 78 y.o. 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 The following plan was discussed with the patient who is in agreement.     Visit Diagnoses     ICD-10-CM   1. Hemiplegia and hemiparesis following unspecified cerebrovascular disease affecting left non-dominant side (HCC)  I69.954   2. Spasticity  R25.2   3. History of stroke  Z86.73   4. Left elbow pain  M25.522   5. Chronic right-sided low back pain with right-sided sciatica  M54.41    G89.29   6. Lumbar spondylosis  M47.816   7. Neuropathic pain  M79.2   8. Lumbar radiculopathy  M54.16   9. Dietary counseling  Z71.3   10. Exercise counseling  Z71.82         Wife, Usha, assists with history.     Rehab/Neuro:   1. 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  States level of debility is out of proportion for how well he recovered after stroke. Even before norovirus virus, while in chemo was able to go to [place], no assistive device. Can use walker a little bit, but fatigues. Also can walk with cane with exercise  or therapy.   2. General debility: ?  CIP/CIM when acutely hospitalized several years ago with norovirus  3. Fatigue: Secondary to debility vs post stroke fatigue vs sleep apnea versus combination of all of the aforementioned.  Has BiPAP, does not tolerate BiPAP.  Also has low T, followed by  endocrinology.  -Med management: plavix; formerly taking Xarelto (positive DVT 6/18/2022) but stopped due to melena  -Therapy: Continue therapy as he is making some improvements with his last round of Botox.  He is able to put his foot flat on the ground to stand.  It is also easier for him to do ADLs such as dressing due to the increase in flexibility in his shoulder abduction.    Spasticity secondary to   1. Hemiplegia and hemiparesis following unspecified cerebrovascular disease affecting left non-dominant side (HCC)  I69.954   2. Spasticity  R25.2     Medication Management: no oral meds  We will proceed with ordering Botox for management of spasticity. The following muscles will be targeted with the corresponding Botox Units.   Botox significantly helped with left elbow pain.  - Referral: Physiatry for repeat Botox injections.  -Patient benefiting from Botox injections.  Improved ADLs and mobility.  Would benefit from continued Botox injections to capitalize on spasticity control that we have gained thus far.    Okay to continue antiplatelet with plavix through the procedure for botulinum toxin injection.  The risks of going off the medication outweigh the risks of doing the procedure on the medication.  I will plan to use 27-gauge needles and ultrasound guidance to avoid all blood vessels during the procedure.  We discussed that while on anticoagulation the patient does have an increased risk of bleeding and hematoma     Botox: left upper extremity + lower extremity  Location Botox Amount in Units   Pec sonya and minor 150 units total (3 locations)   Biceps    200 units (4 locations)   Hamstring - semimembranosus 150 units (3 locations)   Hamstring - semitendinosus  150 units (3 locations)   Hamstring - Bicep femoris long head and short head 150 units (3 locations)   Total Units  800 units          The risks benefits and alternatives to this procedure were discussed and the patient wishes to proceed with the  procedure. Risks include but are not limited to damage to surrounding structures, infection, bleeding, worsening of pain which can be permanent, weakness which can be permanent. Benefits include pain relief, improved function. Alternatives includes not doing the procedure.       Guidance: EMG/US - For appropriate identification and targeting of spastic muscles to be injected.     Other:  Dietary and exercise counseling provided  Insomnia: advise to discontinue use of TV;   Lumbar radiculopathy and neuropathic pain: Primary transforaminal epidural steroid injections with Dr. Bennett; currently using gabapentin at night for pain control; will continue to monitor  History of vitamin D deficiency: Current level is 40 on April 5, 2023; continue supplementation and  follow-up with PCP for management  Diabetic with CKD 3: Continue follow-up with PCP for management  I did have an extensive discussion regarding pathology and treatment options with the patient today including medications, injections, physical agents (eg. water, heat, cold, TENS), therapy-based programs (eg. massage, stretching/traction, exercise), alternative modalities, and lastly surgical intervention:  -Brace/orthotic/assistive devices: AFO L when exercises and ambulates. No wrist splint and discussed with PT/OT. No new ADs  -Limitations: Activity as tolerated  -Therapy (PT/OT/Aquatherapy): Continue to focus on strengthening and stretching with home visit therapy  -Home exercise program: encouraged home exercises of regular strengthening and stretching    -Referrals: n/a   -Medications/Modalities:    Previously prescribed medications  -Diagnostic workup: reviewed today as above  -Interventional program: I would  consider the patient for an injection depending on the results of the above   -Outside records requested:  none      Follow-up: Botox, or sooner as needed for any acute issues.  Patient expressed understanding of the management plan. Patient (and  Family Members) was/were encouraged to call if any worries, issues, problems or concerns prior to the next visit     Please note that this dictation was created using voice recognition software. I have made every reasonable attempt to correct obvious errors but there may be errors of grammar and content that I may have overlooked prior to finalization of this note.      Brett Cantrell DO  Department of Physical Medicine and Rehabilitation  [facility]         CC Madison Bunch D.O.   CC No ref. provider found

## 2023-12-14 ENCOUNTER — TELEMEDICINE (OUTPATIENT)
Dept: PHYSICAL MEDICINE AND REHAB | Facility: MEDICAL CENTER | Age: 78
End: 2023-12-14
Payer: MEDICARE

## 2023-12-14 VITALS
DIASTOLIC BLOOD PRESSURE: 81 MMHG | OXYGEN SATURATION: 100 % | SYSTOLIC BLOOD PRESSURE: 139 MMHG | HEART RATE: 64 BPM | TEMPERATURE: 97.5 F

## 2023-12-14 DIAGNOSIS — M47.816 LUMBAR SPONDYLOSIS: ICD-10-CM

## 2023-12-14 DIAGNOSIS — Z86.73 HISTORY OF STROKE: ICD-10-CM

## 2023-12-14 DIAGNOSIS — M79.2 NEUROPATHIC PAIN: ICD-10-CM

## 2023-12-14 DIAGNOSIS — Z71.82 EXERCISE COUNSELING: ICD-10-CM

## 2023-12-14 DIAGNOSIS — Z79.4 TYPE 2 DIABETES MELLITUS WITH STAGE 3B CHRONIC KIDNEY DISEASE, WITH LONG-TERM CURRENT USE OF INSULIN (HCC): ICD-10-CM

## 2023-12-14 DIAGNOSIS — Z71.3 DIETARY COUNSELING: ICD-10-CM

## 2023-12-14 DIAGNOSIS — E11.22 TYPE 2 DIABETES MELLITUS WITH STAGE 3B CHRONIC KIDNEY DISEASE, WITH LONG-TERM CURRENT USE OF INSULIN (HCC): ICD-10-CM

## 2023-12-14 DIAGNOSIS — M54.41 CHRONIC RIGHT-SIDED LOW BACK PAIN WITH RIGHT-SIDED SCIATICA: ICD-10-CM

## 2023-12-14 DIAGNOSIS — G89.29 CHRONIC RIGHT-SIDED LOW BACK PAIN WITH RIGHT-SIDED SCIATICA: ICD-10-CM

## 2023-12-14 DIAGNOSIS — I69.954 HEMIPLEGIA AND HEMIPARESIS FOLLOWING UNSPECIFIED CEREBROVASCULAR DISEASE AFFECTING LEFT NON-DOMINANT SIDE (HCC): ICD-10-CM

## 2023-12-14 DIAGNOSIS — M25.522 LEFT ELBOW PAIN: ICD-10-CM

## 2023-12-14 DIAGNOSIS — N18.32 TYPE 2 DIABETES MELLITUS WITH STAGE 3B CHRONIC KIDNEY DISEASE, WITH LONG-TERM CURRENT USE OF INSULIN (HCC): ICD-10-CM

## 2023-12-14 DIAGNOSIS — M54.16 LUMBAR RADICULOPATHY: ICD-10-CM

## 2023-12-14 DIAGNOSIS — R25.2 SPASTICITY: ICD-10-CM

## 2023-12-14 PROCEDURE — 99214 OFFICE O/P EST MOD 30 MIN: CPT | Mod: 95 | Performed by: GENERAL PRACTICE

## 2023-12-16 ENCOUNTER — HOSPITAL ENCOUNTER (OUTPATIENT)
Facility: MEDICAL CENTER | Age: 78
End: 2023-12-16
Attending: NURSE PRACTITIONER
Payer: MEDICARE

## 2023-12-18 ENCOUNTER — PATIENT MESSAGE (OUTPATIENT)
Dept: CARDIOLOGY | Facility: MEDICAL CENTER | Age: 78
End: 2023-12-18
Payer: MEDICARE

## 2023-12-28 ENCOUNTER — TELEPHONE (OUTPATIENT)
Dept: PHYSICAL MEDICINE AND REHAB | Facility: MEDICAL CENTER | Age: 78
End: 2023-12-28
Payer: MEDICARE

## 2024-01-02 ENCOUNTER — PATIENT OUTREACH (OUTPATIENT)
Dept: HEALTH INFORMATION MANAGEMENT | Facility: OTHER | Age: 79
End: 2024-01-02
Payer: MEDICARE

## 2024-01-02 ENCOUNTER — HOSPITAL ENCOUNTER (OUTPATIENT)
Facility: MEDICAL CENTER | Age: 79
End: 2024-01-02
Attending: INTERNAL MEDICINE
Payer: MEDICARE

## 2024-01-02 DIAGNOSIS — A04.72 CLOSTRIDIUM DIFFICILE DIARRHEA: ICD-10-CM

## 2024-01-02 DIAGNOSIS — R19.7 DIARRHEA OF PRESUMED INFECTIOUS ORIGIN: ICD-10-CM

## 2024-01-02 PROCEDURE — 87324 CLOSTRIDIUM AG IA: CPT | Mod: XU

## 2024-01-02 PROCEDURE — 87493 C DIFF AMPLIFIED PROBE: CPT

## 2024-01-02 NOTE — PROGRESS NOTES
Pt's spouse called nurse CM regarding concerns with pt's symptoms over past 2 weeks. Pt was previously  being followed in CCM program.     Spouse states on 12/15 pt was having a lot of diarrhea. Spouse reports they called [facility] for a home visit. Spouse reports [facility] recommended stool tests and antidiarrheal medication. Spouse states stool tests were not completed because sample that was sent to lab was insufficient per spouse. Spouse states diarrhea improved for about a week.     Spouse reports pt went to [facility] ER on 12/22 for changes in mentation and not feeling well. Spouse reports pt was diagnosed with UTI. Spouse reports antibiotics were completed several days ago. Spouse states pt's diarrhea returned last night at midnight. Reports pt had multiple episodes of diarrhea. Spouse reports she gave antidiarrhea medication yesterday. States diarrhea appears better today. States pt has been sleeping. Spouse reports pt has been eating and taking fluids. Denies any other symptoms. Reports pt had slight fever yesterday of 99.7. Nurse recommended if pt continues to have diarrhea to go to urgent care for evaluation. No same day appts available at [facility] today. Spouse states it is difficult to get pt to appt. Nurse recommended spouse can contact [facility] for appt if unable to get pt to appt. Spouse would like to know recommendation from PCP. Update to PCP.     1/2/24 12:30 pm: Nurse spoke to PCP. PCP recommends c.difficile test. Spouse agreeable to having PCP order test. States she will go to lab and get stool container. Nurse discussed ER precautions if any worsening diarrhea and unable to keep fluids down to go to ER for evaluation.

## 2024-01-03 DIAGNOSIS — R19.7 DIARRHEA OF PRESUMED INFECTIOUS ORIGIN: ICD-10-CM

## 2024-01-03 LAB
C DIFF DNA SPEC QL NAA+PROBE: NEGATIVE
C DIFF TOX A+B STL QL IA: POSITIVE
C DIFF TOX GENS STL QL NAA+PROBE: NORMAL

## 2024-01-04 ENCOUNTER — PATIENT OUTREACH (OUTPATIENT)
Dept: HEALTH INFORMATION MANAGEMENT | Facility: OTHER | Age: 79
End: 2024-01-04
Payer: MEDICARE

## 2024-01-04 ENCOUNTER — PHARMACY VISIT (OUTPATIENT)
Dept: PHARMACY | Facility: MEDICAL CENTER | Age: 79
End: 2024-01-04
Payer: COMMERCIAL

## 2024-01-04 DIAGNOSIS — I25.10 CORONARY ARTERY DISEASE INVOLVING NATIVE CORONARY ARTERY OF NATIVE HEART WITHOUT ANGINA PECTORIS: ICD-10-CM

## 2024-01-04 DIAGNOSIS — N18.32 TYPE 2 DIABETES MELLITUS WITH STAGE 3B CHRONIC KIDNEY DISEASE, WITH LONG-TERM CURRENT USE OF INSULIN (HCC): ICD-10-CM

## 2024-01-04 DIAGNOSIS — Z79.4 TYPE 2 DIABETES MELLITUS WITH STAGE 3B CHRONIC KIDNEY DISEASE, WITH LONG-TERM CURRENT USE OF INSULIN (HCC): ICD-10-CM

## 2024-01-04 DIAGNOSIS — A04.72 CLOSTRIDIUM DIFFICILE DIARRHEA: ICD-10-CM

## 2024-01-04 DIAGNOSIS — E11.22 TYPE 2 DIABETES MELLITUS WITH STAGE 3B CHRONIC KIDNEY DISEASE, WITH LONG-TERM CURRENT USE OF INSULIN (HCC): ICD-10-CM

## 2024-01-04 PROCEDURE — RXMED WILLOW AMBULATORY MEDICATION CHARGE: Performed by: INTERNAL MEDICINE

## 2024-01-04 NOTE — PROGRESS NOTES
Nurse notified spouse of PCP recommendation to start vancomycin as ordered for positive C.difficile results. Spouse verbalized understanding.

## 2024-01-05 NOTE — PROGRESS NOTES
Spouse called to report she is having a difficult time finding pharmacy that has vancomycin in stock. Requesting assist from nurse CM. Nurse contacted [facility] pharmacy at [facility] and was informed by pharmacy tech that there is vancomycin in stock. Nurse updated PCP. PCP will send prescription to [facility] pharmacy. Nurse contacted spouse and provided this information. Nurse informed spouse pharmacy open until 6:00 pm. Provided location to spouse.

## 2024-01-10 NOTE — PROGRESS NOTES
Noted surgery is today with ortho on Right foot. Nurse informs there is not a wound on left foot. Available to help after surgery with wound care if needed please call.    Subjective:      Pt is a 74 y.o. male who presents with UTI (vomitting, dark uring X1 week )            HPI  This is a recurrent issue. PT comes into the UC with a chief complaint of dysuria, burning on urination, urgency, frequency, and bladder pressure x 7 days. Had urinalysis only and it notes UTI but no urine culture complete and pt does not see PCP until next week and notes he has gone septic in the past from untreated UTIs.  PT denies fevers or chills, CP, SOB, NVD, paresthesias, headaches, dizziness, change in vision, hives, or joint pain. PT states the pain is a 6/10 with burning upon urination, aching in nature and worse at night. Pt states they have not taken any RX meds for this issue. Pt denies flank or back pain as well. The pt's medication list, problem list, and allergies have been evaluated and reviewed during today's visit.        PMH:  Past Medical History:   Diagnosis Date   • Acute respiratory failure with hypoxia (formerly [facility]) 5/20/2017   • CAD (coronary artery disease)     GIOVANNA to RCA in '97, GIOVANNA X2 to LAD and GIOVANNA X2 to OM in '09   • Cancer (formerly [facility])     2017; chemo lympoma   • Cataract    • Cerebrovascular accident (CVA) (formerly [facility]) 12/30/2016    Left arm weakness  etiology of stroke not established, lymphoma discovered on MRI evaluation of stroke, L hemiparesis much worse after acute infectious illness in mid 2017, but no specific diagnosed recurrent neurological etiology, all at [facility]   • CKD (chronic kidney disease) stage 3, GFR 30-59 ml/min (formerly [facility])    • Controlled gout 2014   • Coronary atherosclerosis of native coronary artery     S/P PTCA (percutaneous transluminal coronary angioplasty), RCA, 5/1997, patent on cath 7/10/2009 at the time of interventions on his left anterior descending and circumflex coronary arteries   • Depression    • Diabetes (formerly [facility])    • Enterococcal septicemia (formerly [facility]) 8/12/2017   • Hypertension    • Hypokalemia 2012    controlled with combination of ACE  inhibitor or ARB plus spironolactone   • Hypomagnesemia 08/12/2017    etiology uncertain   • Lymphoma ([facility]) 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 Barry   • NSTEMI (non-ST elevated myocardial infarction) ([facility]) 07/18/2017    complicating UTI with sepsis   • Pain    • Polyneuropathy in diabetes(357.2) 9/11/2013   • Septic shock ([facility]) 5/20/2017   • Skin ulcer of calf ([facility]) 2015    Right, Dr. Terry and wound care   • Stroke ([facility]) 2016    left sided weakness   • Urinary bladder disorder    • Urinary incontinence    • Wound of left leg 2012    Requiring surgery and debridment, Dr. Moore       PSH:  Past Surgical History:   Procedure Laterality Date   • CYSTOSCOPY STENT PLACEMENT Left 2/12/2018    Procedure: CYSTOSCOPY  ;  Surgeon: Rey Barry M.D.;  Location: SURGERY [facility];  Service: Urology   • URETEROSCOPY Left 2/12/2018    Procedure: URETEROSCOPY- FLEXIBLE  ;  Surgeon: Rey Barry M.D.;  Location: SURGERY [facility];  Service: Urology   • LASERTRIPSY Left 2/12/2018    Procedure: LASERTRIPSY - LITHO  ;  Surgeon: Rey Barry M.D.;  Location: SURGERY [facility];  Service: Urology   • WOUND CLOSURE GENERAL  4/3/2012    Performed by GERHARD MOORE at SURGERY SAME DAY [facility] ORS   • ZZZ CARDIAC CATH  2009    Stents to LAD, Om   • DAGOBERTO BY LAPAROSCOPY  1998   • ANGIOPLASTY  1997    RCA followed by other stents as noted above.    • ZZZ CARDIAC CATH  1997    stent RCA   • CATARACT EXTRACTION WITH IOL      bilateral   • LITHOTRIPSY     • TONSILLECTOMY AND ADENOIDECTOMY         Fam Hx:    family history includes Cancer in his mother; Depression in his brother and mother; Diabetes in his father; Heart Disease in his brother and father; Kidney stones in his brother; Psychiatric Illness in his brother, mother, and another family member; Suicide Attempts in an other family member.  Family Status   Relation Name Status    • Fa     • Mo 91 Alive   • Bro  Alive   • MUnc  Alive   • OTHER nephew Alive       Soc HX:  Social History     Socioeconomic History   • Marital status:      Spouse name: Not on file   • Number of children: Not on file   • Years of education: Not on file   • Highest education level: Not on file   Occupational History   • Not on file   Social Needs   • Financial resource strain: Not on file   • Food insecurity:     Worry: Not on file     Inability: Not on file   • Transportation needs:     Medical: Not on file     Non-medical: Not on file   Tobacco Use   • Smoking status: Never Smoker   • Smokeless tobacco: Never Used   Substance and Sexual Activity   • Alcohol use: No   • Drug use: No   • Sexual activity: Not Currently     Partners: Female   Lifestyle   • Physical activity:     Days per week: Not on file     Minutes per session: Not on file   • Stress: Not on file   Relationships   • Social connections:     Talks on phone: Not on file     Gets together: Not on file     Attends [employer] service: Not on file     Active member of club or organization: Not on file     Attends meetings of clubs or organizations: Not on file     Relationship status: Not on file   • Intimate partner violence:     Fear of current or ex partner: Not on file     Emotionally abused: Not on file     Physically abused: Not on file     Forced sexual activity: Not on file   Other Topics Concern   • Not on file   Social History Narrative   • Not on file         Medications:    Current Outpatient Medications:   •  sulfamethoxazole-trimethoprim (BACTRIM DS) 800-160 MG tablet, Take 1 Tab by mouth 2 times a day for 7 days., Disp: 14 Tab, Rfl: 0  •  promethazine (PHENERGAN) 25 MG Tab, Take 1 Tab by mouth every 6 hours as needed for Nausea/Vomiting., Disp: 30 Tab, Rfl: 0  •  allopurinol (ZYLOPRIM) 100 MG Tab, Take 1 Tab by mouth every day., Disp: 90 Tab, Rfl: 0  •  clopidogrel (PLAVIX) 75 MG Tab, TAKE 1 TABLET BY MOUTH EVERY DAY, Disp:  90 Tab, Rfl: 3  •  Dulaglutide (TRULICITY) 1.5 MG/0.5ML Solution Pen-injector, Inject 1.5 mg as instructed every 7 days. On Tue, Disp: 12 PEN, Rfl: 3  •  atorvastatin (LIPITOR) 40 MG Tab, Take 40 mg by mouth every evening., Disp: , Rfl:   •  clopidogrel (PLAVIX) 75 MG Tab, Take 75 mg by mouth every evening., Disp: , Rfl:   •  fenofibrate (TRICOR) 145 MG Tab, Take 145 mg by mouth every evening., Disp: , Rfl:   •  magnesium oxide (MAG-OX) 400 (241.3 Mg) MG Tab tablet, Take 800 mg by mouth 2 times a day., Disp: , Rfl:   •  losartan (COZAAR) 25 MG Tab, Take 2 Tabs by mouth every day. DO NOT RESUME UNTIL Wednesday July 3, Disp: 1 Tab, Rfl: 0  •  Insulin Glargine (TOUJEO MAX SOLOSTAR) 300 UNIT/ML Solution Pen-injector, Inject 40 Units as instructed every bedtime., Disp: , Rfl:   •  insulin lispro, Human, (HUMALOG KWIKPEN) 100 UNIT/ML Solution Pen-injector injection, Inject 5-9 Units as instructed 3 times a day before meals. Sliding Scale, Disp: , Rfl:   •  fluoxetine (PROZAC) 40 MG capsule, TAKE 1 CAPSULE BY MOUTH EVERY DAY, Disp: 90 Cap, Rfl: 2  •  potassium chloride (KLOR-CON) 8 MEQ tablet, Take 1 Tab by mouth every day., Disp: 90 Tab, Rfl: 3  •  metoprolol (TOPROL-XL) 200 MG XL tablet, Take 1 Tab by mouth every evening., Disp: 90 Tab, Rfl: 3  •  DAILY MULTIPLE VITAMINS PO, Take 1 Tab by mouth every day., Disp: , Rfl:   •  acetaminophen (TYLENOL) 500 MG Tab, Take 1,000 mg by mouth every 6 hours as needed for Mild Pain., Disp: , Rfl:   •  ascorbic acid (VITAMIN C) 500 MG tablet, Take 500 mg by mouth every day., Disp: , Rfl:   •  aspirin 81 MG tablet, Take 81 mg by mouth every day., Disp: , Rfl:   •  diclofenac (SOLARAZE) 3 % gel, Apply 1 Application to affected area(s) as needed (Apply's on lower back and hip)., Disp: , Rfl:   •  Cholecalciferol (VITAMIN D) 2000 units Cap, Take 2,000 Units by mouth 2 Times a Day., Disp: , Rfl:       Allergies:  Diphenhydramine hcl; Lorazepam; and Ciprofloxacin    ROS  Review of Systems    Constitutional: Negative for fever, chills and malaise/fatigue.   HENT: Negative for congestion and sore throat.    Eyes: Negative for blurred vision, double vision and photophobia.   Respiratory: Negative for cough and shortness of breath.    Cardiovascular: Negative for chest pain and palpitations.   Gastrointestinal: POS for nausea, vomiting, NEG abdominal pain, diarrhea and constipation.   Genitourinary: Positive for dysuria, urgency and frequency.   Musculoskeletal: Negative for joint pain and myalgias.   Skin: Negative for rash.   Neurological: Negative for dizziness, tingling and headaches.   Endo/Heme/Allergies: Does not bruise/bleed easily.   Psychiatric/Behavioral: Negative for depression. The patient is not nervous/anxious.           Objective:     /78   Pulse 65   Temp 36.2 °C (97.1 °F)   Resp 12   Ht 1.829 m (6')   Wt 93 kg (205 lb)   SpO2 98%   BMI 27.80 kg/m²      Physical Exam      Constitutional: PT is oriented to person, place, and time. PT appears well-developed and well-nourished. No distress.   HENT:   Head: Normocephalic and atraumatic.   Mouth/Throat: Oropharynx is clear and moist. No oropharyngeal exudate.   Eyes: Conjunctivae normal and EOM are normal. Pupils are equal, round, and reactive to light.   Neck: Normal range of motion. Neck supple. No thyromegaly present.   Cardiovascular: Normal rate, regular rhythm, normal heart sounds and intact distal pulses.  Exam reveals no gallop and no friction rub.    No murmur heard.  Pulmonary/Chest: Effort normal and breath sounds normal. No respiratory distress. PT has no wheezes. PT has no rales. Pt exhibits no tenderness.   Abdominal: Soft. Bowel sounds are normal. PT exhibits no distension and no mass. There is no tenderness. There is no rebound and no guarding.   Musculoskeletal: Normal range of motion. PT exhibits no edema and no tenderness.   Neurological: PT is alert and oriented to person, place, and time. PT has normal  reflexes. No cranial nerve deficit.   Skin: Skin is warm and dry. No rash noted. PT is not diaphoretic. No erythema.       Psychiatric: PT has a normal mood and affect. PT behavior is normal. Judgment and thought content normal.          Assessment/Plan:     1. Acute UTI    - sulfamethoxazole-trimethoprim (BACTRIM DS) 800-160 MG tablet; Take 1 Tab by mouth 2 times a day for 7 days.  Dispense: 14 Tab; Refill: 0  - URINE CULTURE(NEW); Future    2. Bilious vomiting with nausea    - promethazine (PHENERGAN) 25 MG Tab; Take 1 Tab by mouth every 6 hours as needed for Nausea/Vomiting.  Dispense: 30 Tab; Refill: 0    PT to follow up with LASHELL Samayoa, PCP this coming week.  Rest, fluids encouraged.  AVS with medical info given.  Pt was in full understanding and agreement with the plan.  Differential diagnosis, natural history, supportive care, and indications for immediate follow-up discussed. All questions answered. Patient agrees with the plan of care.  Follow-up as needed if symptoms worsen or fail to improve.

## 2024-01-15 ENCOUNTER — APPOINTMENT (OUTPATIENT)
Dept: PHYSICAL MEDICINE AND REHAB | Facility: MEDICAL CENTER | Age: 79
End: 2024-01-15
Payer: MEDICARE

## 2024-01-25 ENCOUNTER — PATIENT OUTREACH (OUTPATIENT)
Dept: HEALTH INFORMATION MANAGEMENT | Facility: OTHER | Age: 79
End: 2024-01-25
Payer: MEDICARE

## 2024-01-25 DIAGNOSIS — Z79.4 TYPE 2 DIABETES MELLITUS WITH STAGE 3B CHRONIC KIDNEY DISEASE, WITH LONG-TERM CURRENT USE OF INSULIN (HCC): ICD-10-CM

## 2024-01-25 DIAGNOSIS — N18.32 TYPE 2 DIABETES MELLITUS WITH STAGE 3B CHRONIC KIDNEY DISEASE, WITH LONG-TERM CURRENT USE OF INSULIN (HCC): ICD-10-CM

## 2024-01-25 DIAGNOSIS — E11.59 HYPERTENSION ASSOCIATED WITH DIABETES (HCC): ICD-10-CM

## 2024-01-25 DIAGNOSIS — E11.22 TYPE 2 DIABETES MELLITUS WITH STAGE 3B CHRONIC KIDNEY DISEASE, WITH LONG-TERM CURRENT USE OF INSULIN (HCC): ICD-10-CM

## 2024-01-25 DIAGNOSIS — I15.2 HYPERTENSION ASSOCIATED WITH DIABETES (HCC): ICD-10-CM

## 2024-01-25 PROCEDURE — 99490 CHRNC CARE MGMT STAFF 1ST 20: CPT | Performed by: INTERNAL MEDICINE

## 2024-01-25 PROCEDURE — 99439 CHRNC CARE MGMT STAF EA ADDL: CPT | Performed by: INTERNAL MEDICINE

## 2024-01-25 NOTE — PROGRESS NOTES
Pt's spouse called to report pt has not been feeling well today. Reports pt completed antibiotics for C-difficile a while ago. Reports pt had formed stools for a while and now last several days pt is having very loose stools 1-2 times a day. Spouse reports pt has also been having some congestion, cough and body aches today. Reports no fever. Spouse states pt had visit with oncology on Monday. Reports pt was around his son who has been sick with fever and headache. Spouse states she is in the process of doing a home  COVID test on pt. Spouse informed nurse during conversation that COVID test is positive. Spouse reports pt has not had COVID before. Reports pt had last booster in October.  Spouse wondering if PCP recommends Paxlovid or if pt should be seen by [facility].  Nurse will route message to PCP for recommendation.     1/25/24 4:00 pm: Nurse spoke to PCP regarding pt symptoms and positive Covid test. PCP recommends pt get seen by [facility] for evaluation. Nurse provided this information to spouse. Spouse will contact [facility]. Nurse discussed if pt has any worsening symptoms including any shortness of breath to go to ER for evaluation.    Nurse discussed with spouse if pt would like to re-enroll in the personal care management program. Pt was previously enrolled and discharged in October 2023. Spouse agreeable to have pt re-enroll in program. Nurse will follow-up with pt/spouse when pt feeling better to review any updates for the intake assessment and care plan. Spouse has nurse CM contact number for any questions or concerns.

## 2024-01-26 NOTE — TELEPHONE ENCOUNTER
With his medical history and current medicines I would recommend [facility] as I would worry about interactions with paxlovid and also want to make sure he wouldn't need the ER

## 2024-01-30 ENCOUNTER — PATIENT OUTREACH (OUTPATIENT)
Dept: HEALTH INFORMATION MANAGEMENT | Facility: OTHER | Age: 79
End: 2024-01-30
Payer: MEDICARE

## 2024-01-30 DIAGNOSIS — I15.2 HYPERTENSION ASSOCIATED WITH DIABETES (HCC): ICD-10-CM

## 2024-01-30 DIAGNOSIS — Z79.4 TYPE 2 DIABETES MELLITUS WITH STAGE 3B CHRONIC KIDNEY DISEASE, WITH LONG-TERM CURRENT USE OF INSULIN (HCC): ICD-10-CM

## 2024-01-30 DIAGNOSIS — E11.59 HYPERTENSION ASSOCIATED WITH DIABETES (HCC): ICD-10-CM

## 2024-01-30 DIAGNOSIS — N18.32 TYPE 2 DIABETES MELLITUS WITH STAGE 3B CHRONIC KIDNEY DISEASE, WITH LONG-TERM CURRENT USE OF INSULIN (HCC): ICD-10-CM

## 2024-01-30 DIAGNOSIS — E11.22 TYPE 2 DIABETES MELLITUS WITH STAGE 3B CHRONIC KIDNEY DISEASE, WITH LONG-TERM CURRENT USE OF INSULIN (HCC): ICD-10-CM

## 2024-01-30 DIAGNOSIS — R05.1 ACUTE COUGH: ICD-10-CM

## 2024-01-30 RX ORDER — BENZONATATE 100 MG/1
100 CAPSULE ORAL 3 TIMES DAILY PRN
Qty: 60 CAPSULE | Refills: 0 | Status: SHIPPED | OUTPATIENT
Start: 2024-01-30 | End: 2024-02-27

## 2024-01-30 NOTE — PROGRESS NOTES
Nurse CM outreach call to pt and spouse for follow-up. Nurse spoke to pt's spouse.  Agreeable to have pt re-enroll in personal care management program. Pt has general risk score of 3. Pt has history of DM, HTN, Hx of CVA, Left hemiparesis. Other chronic health conditions are listed on problem list in Epic. Pt is wheel chair bound and has support from spouse and son. Pt had recently tested positive for COVID-19 on 1/25/24. Pt did have home visit with provider with [facility]. Spouse reports pt is doing better but still has a cough. Spouse reports she has been giving Benzonatate for cough. Spouse states she does need a refill from PCP if provider recommends. Pt has history of c.difficile. Spouse reports diarrhea was worse for first several days of COVID infection. Reports now doing much better. States no BM's yesterday. Reports pt did have a soft BM this am.    Spouse reports blood sugars have been in good range. Pt is on Toujeo insulin and humalog insulin. Last A1C was 8/21/23 7.0. Spouse reports no problem with hypoglycemia. Reports blood sugar have been in 80's and 90's. Fasting blood sugar yesterday morning was 92. Spouse reports pt has been eating and taking fluids.     History of hypertension. Spouse reports patients blood pressures have been in a good range. Reports last reading was 130/73 and 103/71. Pt currently has all of his medications and is taking as directed. Nurse reviewed current medication list. Spouse reports pt not taking Zetia or apresoline.     Plan: Spouse agreeable to having nurse reach out to pt for follow-up when pt feeling better to update care plan. Pt is scheduled to see PCP on 2/7/24. Spouse states she does have assist to get pt to appt on that date. Nurse will route message to PCP for refill request on Benzonatate. Spouse has nurse CM contact number if any questions or concerns.

## 2024-02-07 ENCOUNTER — APPOINTMENT (OUTPATIENT)
Dept: MEDICAL GROUP | Facility: PHYSICIAN GROUP | Age: 79
End: 2024-02-07
Payer: MEDICARE

## 2024-02-16 ENCOUNTER — PATIENT MESSAGE (OUTPATIENT)
Dept: HEALTH INFORMATION MANAGEMENT | Facility: OTHER | Age: 79
End: 2024-02-16

## 2024-02-18 DIAGNOSIS — F33.41 RECURRENT MAJOR DEPRESSIVE DISORDER, IN PARTIAL REMISSION (HCC): ICD-10-CM

## 2024-02-20 RX ORDER — FLUOXETINE HYDROCHLORIDE 40 MG/1
40 CAPSULE ORAL
Qty: 90 CAPSULE | Refills: 3 | Status: SHIPPED | OUTPATIENT
Start: 2024-02-20

## 2024-02-20 NOTE — TELEPHONE ENCOUNTER
Received request via: Patient    Was the patient seen in the last year in this department? Yes    Does the patient have an active prescription (recently filled or refills available) for medication(s) requested? No    Pharmacy Name: [facility]    Does the patient have half-way Plus and need 100 day supply (blood pressure, diabetes and cholesterol meds only)? Yes, quantity updated to 100 days

## 2024-02-27 ENCOUNTER — OFFICE VISIT (OUTPATIENT)
Dept: MEDICAL GROUP | Facility: PHYSICIAN GROUP | Age: 79
End: 2024-02-27
Payer: MEDICARE

## 2024-02-27 ENCOUNTER — PATIENT OUTREACH (OUTPATIENT)
Dept: HEALTH INFORMATION MANAGEMENT | Facility: OTHER | Age: 79
End: 2024-02-27

## 2024-02-27 VITALS
TEMPERATURE: 96.3 F | WEIGHT: 186 LBS | OXYGEN SATURATION: 97 % | BODY MASS INDEX: 25.19 KG/M2 | DIASTOLIC BLOOD PRESSURE: 50 MMHG | SYSTOLIC BLOOD PRESSURE: 116 MMHG | HEART RATE: 82 BPM | HEIGHT: 72 IN | RESPIRATION RATE: 16 BRPM

## 2024-02-27 DIAGNOSIS — E11.22 TYPE 2 DIABETES MELLITUS WITH STAGE 3B CHRONIC KIDNEY DISEASE, WITH LONG-TERM CURRENT USE OF INSULIN (HCC): ICD-10-CM

## 2024-02-27 DIAGNOSIS — N18.32 TYPE 2 DIABETES MELLITUS WITH STAGE 3B CHRONIC KIDNEY DISEASE, WITH LONG-TERM CURRENT USE OF INSULIN (HCC): ICD-10-CM

## 2024-02-27 DIAGNOSIS — Z79.4 TYPE 2 DIABETES MELLITUS WITH STAGE 3B CHRONIC KIDNEY DISEASE, WITH LONG-TERM CURRENT USE OF INSULIN (HCC): ICD-10-CM

## 2024-02-27 DIAGNOSIS — E11.69 HYPERLIPIDEMIA ASSOCIATED WITH TYPE 2 DIABETES MELLITUS (HCC): ICD-10-CM

## 2024-02-27 DIAGNOSIS — G31.9 CEREBRAL ATROPHY (HCC): ICD-10-CM

## 2024-02-27 DIAGNOSIS — E78.5 HYPERLIPIDEMIA ASSOCIATED WITH TYPE 2 DIABETES MELLITUS (HCC): ICD-10-CM

## 2024-02-27 DIAGNOSIS — G81.94 LEFT HEMIPARESIS (HCC): ICD-10-CM

## 2024-02-27 DIAGNOSIS — F33.1 MODERATE EPISODE OF RECURRENT MAJOR DEPRESSIVE DISORDER (HCC): ICD-10-CM

## 2024-02-27 DIAGNOSIS — E11.59 HYPERTENSION ASSOCIATED WITH DIABETES (HCC): ICD-10-CM

## 2024-02-27 DIAGNOSIS — I48.0 PAROXYSMAL ATRIAL FIBRILLATION (HCC): ICD-10-CM

## 2024-02-27 DIAGNOSIS — I15.2 HYPERTENSION ASSOCIATED WITH DIABETES (HCC): ICD-10-CM

## 2024-02-27 DIAGNOSIS — I73.9 PVD (PERIPHERAL VASCULAR DISEASE) (HCC): ICD-10-CM

## 2024-02-27 LAB
HBA1C MFR BLD: 7.3 % (ref ?–5.8)
POCT INT CON NEG: NEGATIVE
POCT INT CON POS: POSITIVE

## 2024-02-27 PROCEDURE — 3078F DIAST BP <80 MM HG: CPT | Performed by: INTERNAL MEDICINE

## 2024-02-27 PROCEDURE — 1170F FXNL STATUS ASSESSED: CPT | Performed by: INTERNAL MEDICINE

## 2024-02-27 PROCEDURE — 99214 OFFICE O/P EST MOD 30 MIN: CPT | Performed by: INTERNAL MEDICINE

## 2024-02-27 PROCEDURE — 3074F SYST BP LT 130 MM HG: CPT | Performed by: INTERNAL MEDICINE

## 2024-02-27 PROCEDURE — 83036 HEMOGLOBIN GLYCOSYLATED A1C: CPT | Performed by: INTERNAL MEDICINE

## 2024-02-27 ASSESSMENT — FIBROSIS 4 INDEX: FIB4 SCORE: 2.11

## 2024-02-27 NOTE — LETTER
[facility]  Madison Bunch D.O.  [address]  [place] [address]  Fax: [phone]   Authorization for Release/Disclosure of   Protected Health Information   Name: AV MADRIGAL : 1945 SSN: [id number]   Address: [address]  [address]    [place] [address] Phone:    [phone] (home)    I authorize the entity listed below to release/disclose the PHI below to:   [facility]/Madison Bunch D.O. and Madison Bunch D.O.   Provider or Entity Name:  Ophthalmology    Address   City, State, Zip   Phone:      Fax:     Reason for request: continuity of care   Information to be released:    [  ] LAST COLONOSCOPY,  including any PATH REPORT and follow-up  [  ] LAST FIT/COLOGUARD RESULT [  ] LAST DEXA  [  ] LAST MAMMOGRAM  [  ] LAST PAP  [  ] LAST LABS [ x ] RETINA EXAM REPORT  [  ] IMMUNIZATION RECORDS  [ x ] Release all info      [ x ] Check here and initial the line next to each item to release ALL health information INCLUDING  _____ Care and treatment for drug and / or alcohol abuse  _____ HIV testing, infection status, or AIDS  _____ Genetic Testing    DATES OF SERVICE OR TIME PERIOD TO BE DISCLOSED: ________  I understand and acknowledge that:  * This Authorization may be revoked at any time by you in writing, except if your health information has already been used or disclosed.  * Your health information that will be used or disclosed as a result of you signing this authorization could be re-disclosed by the recipient. If this occurs, your re-disclosed health information may no longer be protected by State or Federal laws.  * You may refuse to sign this Authorization. Your refusal will not affect your ability to obtain treatment.  * This Authorization becomes effective upon signing and will  on (date) __________.      If no date is indicated, this Authorization will  one (1) year from the signature date.    Name: Av Madrigal  Signature:  Date:   2/27/2024     PLEASE FAX REQUESTED RECORDS BACK TO: [phone]

## 2024-02-28 NOTE — PROGRESS NOTES
Subjective:   Chief Complaint/History of Present Illness:  Av Bob is a 78 y.o. male established patient who presents today to discuss medical problems as listed below. Av is accompanied by his wife for today's visit.    Problem   Cerebral Atrophy (Hcc)    Incidental finding of cerebral atrophy on previous head imaging. He has cognitive impairment and sundowning behavior and has excellent support from his wife Usha and his family.     Pvd (Peripheral Vascular Disease) (Hcc)    History of PVD and prior DVT when he was ill. No longer on DOAC. He takes Plavix due to prior CVA.     (HCC) Left hemiparesis    Acute 12/2016 at [facility].  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  Symptoms originally improved significantly with chemotherapy and he was walking well, and then hemiparesis worsened significantly after sepsis from UTI and norovirus infection mid 2017.    Previously could walk short distance with a cane or walker until medical decompensation in 2022, has not been able to regain strength in his legs since that time.      (HCC) Moderate episode of recurrent major depressive disorder    He was noted to be depressed after his major medical events in early 2017.  A few years ago his initiated on Prozac and current dose is 40 mg daily.  His wife notes that he is always been emotional and will cry easily at [employer] movies or commercials.  She notes that he still does continue to cry sporadically but it resolves quickly.  No inappropriate laughing or other indication of pseudobulbar affect.      Previously reduced dose from 40 mg to 20 mg but symptoms worsened so went back up.    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      He reports that this diagnosis has continued to follow him around.  He is on rate control medication.     He wore a heart monitor which demonstrated paroxysmal SVT but no atrial fibrillation was noted.  Patient denies any issues with palpitations.  He is not on any antiarrhythmics and no subsequent episodes of atrial fibrillation have been captured.    He follows with Dr. Tucker of cardiology.    Current regimen: carvedilol 6.25 mg twice daily  Previous regimen: Metoprolol succinate 100-200 mg daily     ([facility]) Hypertension associated with diabetes    He has a history of hypertension with dose reduction over the past year due to signs/symptoms of over treatment.    Home blood pressure ranges 110's-140's/60-70     He follows with both cardiology, urology, nephrology.    Current regimen: Amlodipine 10 mg daily, carvedilol 6.25 mg twice daily, olmesartan 40 mg daily, and hydralazine as needed for SBP greater than 160     Type 2 Diabetes Mellitus With Stage 3b Chronic Kidney Disease, With Long-Term Current Use of Insulin ([facility])     Latest Reference Range & Units 02/27/24 15:52   Glycohemoglobin 5.8 % 7.3 !     Longstanding history of type 2 diabetes on insulin.  Previously followed with endocrinology, Dr. Rasheed.     Current regimen includes Toujeo 18-25 units daily, Humalog 5 units for sugars between 101-150, increase by 2 units if greater than 150.  Correction dosage is 2: 50 greater than 150.    Holding Trulicity since most recent hospitalization in June 2022, have not resumed.     Complicated by retinal involvement, neuropathy, CKDIII-IV, and microalbuminuria.     ([facility]) Hyperlipidemia associated with type 2 diabetes mellitus     Latest Reference Range & Units 05/13/23 11:09   Cholesterol,Tot 100 - 199 mg/dL 141   Triglycerides 0 - 149 mg/dL 131   HDL >=40 mg/dL 33 !   LDL <100 mg/dL 82       History of stroke, CAD, DM2, and   hyperlipidemia. Currently on high dose atorvastatin 80 mg daily and fenofibrate 145 mg daily. Follows with cardiology, Dr. Tucker. Interested in considering adding PCSK9 inhibitor therapy in place of statin/fibrate as LDL not at goal.    Zetia have been added to medication regiment however he developed profound fatigue and confusional state which resolved after he stopped the Zetia.  We will monitor without this medicine moving forward.          Current Medications:  Current Outpatient Medications Ordered in Epic   Medication Sig Dispense Refill    fluoxetine (PROZAC) 40 MG capsule TAKE 1 CAPSULE BY MOUTH EVERY DAY 90 Capsule 3    carvedilol (COREG) 6.25 MG Tab Take 1 Tablet by mouth 2 times a day with meals. 180 Tablet 3    amLODIPine (NORVASC) 10 MG Tab Take 1 Tablet by mouth every day. 90 Tablet 3    Insulin Pen Needle (PEN NEEDLES) 32G X 4 MM Misc 1 Each 5 Times a Day. USE FOR INSULIN SHOTS FIVE TIMES DAILY 500 Each 3    olmesartan (BENICAR) 40 MG Tab Take 1 Tablet by mouth every evening. 90 Tablet 3    allopurinol (ZYLOPRIM) 100 MG Tab Take 1 Tablet by mouth every evening. 100 Tablet 3    gabapentin (NEURONTIN) 100 MG Cap Take 1 Capsule by mouth 3 times a day as needed (pain). 90 Capsule 2    Insulin Glargine, 1 Unit Dial, (TOUJEO SOLOSTAR) 300 UNIT/ML Solution Pen-injector Inject 18-25 Units under the skin every day. 7.5 mL 3    Probiotic Product (PROBIOTIC DAILY) Cap Take  by mouth. Floaster      Potassium Chloride CR (MICRO-K) 8 MEQ Cap CR capsule TAKE 1 CAPSULE BY MOUTH EVERY 48 HOURS 45 Capsule 3    clopidogrel (PLAVIX) 75 MG Tab Take 1 Tablet by mouth every day. 90 Tablet 3    atorvastatin (LIPITOR) 80 MG tablet Take 1 Tablet by mouth every evening. 100 Tablet 3    HUMALOG KWIKPEN 100 UNIT/ML Solution Pen-injector injection PEN Inject 5 Units under the skin 3 times a day before meals. Humalog 5 units for sugars between 101-150, increase by 2 units if greater than 150.  Correction dosage is 2:50 greater  than 150 9 mL 3    BOTOX 200 units Recon Soln injection       glucose blood (ONETOUCH ULTRA) strip 1 Strip by Other route 3 times a day. 300 Strip 3    finasteride (PROSCAR) 5 MG Tab Take 5 mg by mouth every evening.      Multiple Vitamin (MULTIVITAMIN PO) Take 1 Tablet by mouth every morning.      Cholecalciferol (VITAMIN D) 2000 UNIT Tab Take 2,000 Units by mouth every morning.      magnesium oxide (MAG-OX) 400 MG Tab tablet Take 800 mg by mouth 2 times a day.      omeprazole (PRILOSEC) 20 MG delayed-release capsule Take 1 Capsule by mouth every day. 30 Capsule 2    acetaminophen (TYLENOL) 325 MG Tab Take 2 Tablets by mouth every 6 hours as needed for Mild Pain, Moderate Pain or Fever. 30 Tablet 0     No current Epic-ordered facility-administered medications on file.          Objective:   Physical Exam:    Vitals: /50 (BP Location: Right arm, Patient Position: Sitting, BP Cuff Size: Adult)   Pulse 82   Temp (!) 35.7 °C (96.3 °F) (Temporal)   Resp 16   Ht 1.829 m (6')   Wt 84.4 kg (186 lb)   SpO2 97%    BMI: Body mass index is 25.23 kg/m².  Physical Exam  Constitutional:       General: He is not in acute distress.     Appearance: Normal appearance. He is not ill-appearing.   HENT:      Right Ear: Ear canal and external ear normal. There is no impacted cerumen.      Left Ear: Ear canal and external ear normal. There is no impacted cerumen.   Eyes:      General: No scleral icterus.     Conjunctiva/sclera: Conjunctivae normal.   Cardiovascular:      Rate and Rhythm: Normal rate and regular rhythm.      Pulses: Normal pulses.   Pulmonary:      Effort: Pulmonary effort is normal. No respiratory distress.      Breath sounds: Normal breath sounds. No wheezing or rhonchi.   Abdominal:      General: Bowel sounds are normal.      Palpations: Abdomen is soft.   Musculoskeletal:      Right lower leg: No edema.      Left lower leg: No edema.      Comments: Left hemiparesis   Skin:     General: Skin is warm and dry.       Findings: No rash.   Neurological:      Gait: Gait abnormal.      Comments: Uses a wheelchair for ambulation   Psychiatric:         Mood and Affect: Mood normal.      Comments: Cognitive impairment, alert, answers yes/no questions, looks to his wife if he does not know an answer          Assessment and Plan:   Av is a 78 y.o. male with the following:  Problem List Items Addressed This Visit       ([facility]) Hyperlipidemia associated with type 2 diabetes mellitus     Chronic ongoing problem, intolerant or levels not at goal with previous therapy.  Significant side effect cognitively with Zetia.  Could consider PSK 9 inhibitor therapy in the future.  With cardiology planned in May 2024.         ([facility]) Hypertension associated with diabetes     Chronic ongoing problem, blood pressures have trended down since carvedilol dose was increased and he was transition from losartan to olmesartan.  Continue 3 drug approach with amlodipine 10 mg daily, carvedilol 6.25 mg twice daily, and olmesartan 40 mg daily.         ([facility]) Left hemiparesis     Chronic ongoing problem, continues to have significant left hemiparesis from prior stroke in 2016.  He is overdue to follow-up with physiatry for Botox injections, this was delayed when he had C. difficile and then again when he had COVID.  They plan to proceed with podiatry follow-up to reinstitute injections.  He declines any additional muscle spasm or muscle relaxant medicines at this time.         ([facility]) Moderate episode of recurrent major depressive disorder     Chronic ongoing problem, mood overall seems to be doing well however he does have periodic flareups of pain requiring more gabapentin which can be sedating for him.  Will plan to continue current medical therapy with fluoxetine 40 mg daily.  Fortunately he has excellent support from his wife, children, and grandchildren.         Cerebral atrophy ([facility])     Chronic ongoing problem, demonstrated on previous imaging, has  cognitive impairment and some occasional sundowning behavior likely as a consequence of prior stroke.         Paroxysmal atrial fibrillation (HCC)     Chronic stable problem, no longer requires rate control therapy with metoprolol and has been transition onto carvedilol with adequate control.  This was a lone event as the atrial fibrillation occurred in the setting of septic shock from norovirus during his treatment for non-Hodgkin lymphoma manifesting with brain metastasis.  Follow-up with cardiology as recommended.         PVD (peripheral vascular disease) (HCC)     Chronic ongoing problem, continues on Plavix due to history of previous stroke and peripheral vascular disease.         Type 2 diabetes mellitus with stage 3b chronic kidney disease, with long-term current use of insulin (HCC)     Chronic ongoing problem, slight uptick of A1c from 6.8-7.3 over the past year.  He has blood sugar checked at least twice daily and typically fasting is ranging 101 130 with a few outliers, 1 episode of hypoglycemia down to 59.  Continues with correction at mealtimes as well.  Have discussed continuous glucose monitor in the past which they declined at this time.  Retinal exam from 2 weeks ago with good report, will request from Dr. Pastor.         Relevant Orders    POCT  A1C (Completed)          RTC: Return in about 6 months (around 8/27/2024).    I spent a total of 34 minutes with record review, exam, communication with the patient, communication with other providers, and documentation of this encounter.    PLEASE NOTE: This dictation was created using voice recognition software. I have made every reasonable attempt to correct obvious errors, but I expect that there are errors of grammar and possibly content that I did not discover before finalizing the note.      Madison Bunch, DO  Geriatric and Internal Medicine  [facility]

## 2024-02-28 NOTE — PROGRESS NOTES
Nurse FREDO met with pt and spouse after PCP appt today. Pt here for follow-up with PCP. Spouse reports pt is doing better. Pt had history of COVID-19 infection in January. Spouse reports pt now doing much better. Pt also had history of C.difficile infection in January.  Spouse reports pt will follow-up with physiatry for botox injection for contractures. Pt is wheelchair bound and needs total assist with transfers.  Pt receives therapy once a week in the home for exercises. Pt has history of diabetes. A1C in clinic today is 7.3. Nurse FREDO will contact pt/spouse next month for monthly CCM call and to update care plan.  Spouse and pt report no current care management needs.

## 2024-03-01 DIAGNOSIS — E11.22 TYPE 2 DIABETES MELLITUS WITH STAGE 3B CHRONIC KIDNEY DISEASE, WITH LONG-TERM CURRENT USE OF INSULIN (HCC): ICD-10-CM

## 2024-03-01 DIAGNOSIS — Z79.4 TYPE 2 DIABETES MELLITUS WITH STAGE 3B CHRONIC KIDNEY DISEASE, WITH LONG-TERM CURRENT USE OF INSULIN (HCC): ICD-10-CM

## 2024-03-01 DIAGNOSIS — N18.32 TYPE 2 DIABETES MELLITUS WITH STAGE 3B CHRONIC KIDNEY DISEASE, WITH LONG-TERM CURRENT USE OF INSULIN (HCC): ICD-10-CM

## 2024-03-03 RX ORDER — INSULIN LISPRO 100 [IU]/ML
INJECTION, SOLUTION INTRAVENOUS; SUBCUTANEOUS
Qty: 9 ML | Refills: 3 | Status: SHIPPED | OUTPATIENT
Start: 2024-03-03

## 2024-03-05 DIAGNOSIS — R25.2 SPASTICITY: ICD-10-CM

## 2024-03-05 DIAGNOSIS — I69.954 HEMIPLEGIA AND HEMIPARESIS FOLLOWING UNSPECIFIED CEREBROVASCULAR DISEASE AFFECTING LEFT NON-DOMINANT SIDE (HCC): ICD-10-CM

## 2024-03-06 DIAGNOSIS — I10 ESSENTIAL HYPERTENSION: ICD-10-CM

## 2024-03-06 DIAGNOSIS — D64.9 ANEMIA, UNSPECIFIED TYPE: ICD-10-CM

## 2024-03-06 DIAGNOSIS — N18.2 CKD (CHRONIC KIDNEY DISEASE), STAGE II: ICD-10-CM

## 2024-03-25 ENCOUNTER — OFFICE VISIT (OUTPATIENT)
Dept: PHYSICAL MEDICINE AND REHAB | Facility: MEDICAL CENTER | Age: 79
End: 2024-03-25
Payer: MEDICARE

## 2024-03-25 VITALS
HEIGHT: 72 IN | DIASTOLIC BLOOD PRESSURE: 62 MMHG | TEMPERATURE: 96.8 F | HEART RATE: 69 BPM | SYSTOLIC BLOOD PRESSURE: 118 MMHG | BODY MASS INDEX: 25.06 KG/M2 | OXYGEN SATURATION: 98 % | WEIGHT: 185 LBS

## 2024-03-25 DIAGNOSIS — M24.561 CONTRACTURE OF RIGHT KNEE: ICD-10-CM

## 2024-03-25 DIAGNOSIS — I69.954 HEMIPLEGIA AND HEMIPARESIS FOLLOWING UNSPECIFIED CEREBROVASCULAR DISEASE AFFECTING LEFT NON-DOMINANT SIDE (HCC): ICD-10-CM

## 2024-03-25 DIAGNOSIS — R25.2 SPASTICITY: ICD-10-CM

## 2024-03-25 PROCEDURE — 99213 OFFICE O/P EST LOW 20 MIN: CPT | Mod: 25 | Performed by: GENERAL PRACTICE

## 2024-03-25 PROCEDURE — 3078F DIAST BP <80 MM HG: CPT | Performed by: GENERAL PRACTICE

## 2024-03-25 PROCEDURE — 1126F AMNT PAIN NOTED NONE PRSNT: CPT | Performed by: GENERAL PRACTICE

## 2024-03-25 PROCEDURE — 3074F SYST BP LT 130 MM HG: CPT | Performed by: GENERAL PRACTICE

## 2024-03-25 PROCEDURE — 64643 CHEMODENERV 1 EXTREM 1-4 EA: CPT | Mod: 59 | Performed by: GENERAL PRACTICE

## 2024-03-25 PROCEDURE — 76942 ECHO GUIDE FOR BIOPSY: CPT | Mod: 26,59 | Performed by: GENERAL PRACTICE

## 2024-03-25 PROCEDURE — 64642 CHEMODENERV 1 EXTREMITY 1-4: CPT | Performed by: GENERAL PRACTICE

## 2024-03-25 PROCEDURE — 1170F FXNL STATUS ASSESSED: CPT | Performed by: GENERAL PRACTICE

## 2024-03-25 RX ORDER — GABAPENTIN 100 MG/1
100-300 CAPSULE ORAL NIGHTLY PRN
Qty: 90 CAPSULE | Refills: 3 | Status: SHIPPED | OUTPATIENT
Start: 2024-03-25

## 2024-03-25 ASSESSMENT — FIBROSIS 4 INDEX: FIB4 SCORE: 2.11

## 2024-03-25 ASSESSMENT — PATIENT HEALTH QUESTIONNAIRE - PHQ9: CLINICAL INTERPRETATION OF PHQ2 SCORE: 0

## 2024-03-25 ASSESSMENT — PAIN SCALES - GENERAL: PAINLEVEL: NO PAIN

## 2024-03-25 NOTE — PROGRESS NOTES
Physiatry (Physical Medicine and  Rehabilitation)       Patient Name: Av Bob   Patient : 1945  Patient Age: 78 y.o.   PCP: Madison Bunch D.O.  MRN: [id number]     Date of service: See epic      This is a separate note from the procedure.     Chief complaint: Right knee pain    HISTORY    Av Bob is a 78 y.o. year old very pleasant male who presented for a procedure but also has the complaint of right knee contracture    HPI:    Nonambulatory. Painful.  Typically controlled gabapentin. Wheelchair bound.       ROS:   Red Flags ROS:   Fever, Chills, Sweats: Denies  Involuntary Weight Loss: Denies  Bladder Incontinence: Denies  Bowel Incontinence: denies  Loss of genital sensation: Denies  Falls: denies  progressive motor weakness: denies  All other systems reviewed and negative.      GOALS OF TREATMENT: Symptom/Pain relief. improve function.        PMHx:   Past Medical History:   Diagnosis Date    ([facility]) Chronic ulcer of right lower extremity with fat layer exposed 2018    Acute cystitis without hematuria 2019    Acute deep vein thrombosis (DVT) of RLE and partial DVT of LLE 2022    US LE (2022):   1.  Acute appearing RIGHT posterior tibial vein DVT.  2.  Subacute appearing LEFT common femoral vein DVT and proximal femoral   vein junction DVT.   He had leg pain and ultrasound of bilateral lower extremities was completed which was positive for new DVT, acute in the right posterior tibial vein and subacute in the left common femoral and proximal femoral vein junction as noted    Acute on chronic renal failure (HCC) 2020    Acute prostatitis 2022    New problem of prostatitis identified by dispatch health when patient developed hematuria with rectal pain.  He followed up with his urology PA and was placed back on Bactrim.  He was recently on Bactrim and has a history of recurrent urinary tract infections related to neurogenic bladder from prior  stroke.  Rectal pain is dissipated however he continues to have hematuria.  He had associated CATA o    Acute respiratory failure with hypoxia ([facility]) 5/20/2017    CAD (coronary artery disease)     GIOVANNA to RCA in '97, GIOVANNA X2 to LAD and GIOVANNA X2 to OM in '09    Cancer ([facility])     2017; chemo lympoma non hodgkins lymphoma    Cataract     dez iol    Cerebrovascular accident (CVA) ([facility]) 12/30/2016    Left arm weakness  etiology of stroke not established, lymphoma discovered on MRI evaluation of stroke, L hemiparesis much worse after acute infectious illness in mid 2017, but no specific diagnosed recurrent neurological etiology, all at [facility]    Chickenpo     CKD (chronic kidney disease) stage 3, GFR 30-59 ml/min ([facility])     Controlled gout 2014    Coronary atherosclerosis of native coronary artery     S/P PTCA (percutaneous transluminal coronary angioplasty), RCA, 5/1997, patent on cath 7/10/2009 at the time of interventions on his left anterior descending and circumflex coronary arteries    Daytime sleepiness     Depression     Diabetes ([facility])     Diarrhea 7/2/2019    Difficulty swallowing     Dyschezia 9/22/2022    For the past 6 months or so he has been experiencing significant pain with defecation.  Does not happen every time.  His wife who is his primary caregiver notes there is no hard stool when he has a painful episodes.  He also is having stool incontinence sometimes when he coughs and other times he will pass a full bowel movement and not be aware.  He has not yet seen GI for this problem.    Dysphagia 3/15/2021    He reports progressive worsening of his swallow function.  He notices at least once per week he is choking and coughing, can be with pills or can be with food.  The episodes are quite frightening and he takes a bit of time for him to recover.  He has a prior history of stroke and recalls working with speech-language pathology at that time but does not remember if he did  any formal esophagrams or s    Enterococcal septicemia (HCC) 8/12/2017    Exposure to SARS-associated coronavirus 10/13/2021    He reports viral illness last week with runny nose, congestion, productive cough, and wheezing.  He went to a play of his grandson and may have been exposed to Covid.  He was feeling very bad last Friday and over the weekend and now is at least 50% better.    Roberto hematuria 1/29/2020    Frequent urination     GERD (gastroesophageal reflux disease)     Divehi measles     History of renal calculi 11/19/2019    Hordeolum externum of left lower eyelid 9/14/2021    He reports to clinic with 3 weeks of erythema and pain of the left lower eyelid. No clear inciting cause. Reports no globe pain. Lower > upper eyelid swelling and erythema. After 1.5 weeks had drainage of purulence from the lower medial eyelid. No photosensitivity. Using warm compresses. No changes in visual acuity. Saw retinal specialist around day 1 or 2 of symptoms who felt it could be related     Hospital discharge follow-up 6/27/2022    Av was admitted to the hospital from 5/28 to 5/31/2022 for weakness and sepsis related to urinary tract infection.  [facility] to try to improve mobility and ADLs due to prior stroke.  This was complicated by C. difficile infection as well as lower extremity DVTs.  Diarrhea resolved with oral vancomycin.  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 Barry    Normocytic hypochromic anemia 5/20/2017    NSTEMI (non-ST elevated myocardial infarction) ([facility]) 07/18/2017    complicating UTI with sepsis    Pain 06/30/2021    right leg foot    Peripheral vascular disease ([facility])     Polyneuropathy in diabetes(357.2) 9/11/2013    Pyelonephritis 5/24/2022    Av had abrupt onset of illness starting on Sunday.  He felt unwell and then had projectile vomiting x3 episodes after dinner that evening.  He had associated nausea but no overt abdominal pain.  He has continued to have anorexia and is having difficulty with his p.o. intake.  He did get in some fruit in a month and yesterday and about 50 to 60 ounces of water.  He has chronic urinary retention    Renal cyst 1/29/2020    Renal mass 1/21/2020    Septic shock ([facility]) 5/20/2017    Skin ulcer of calf ([facility]) 2015    Right, Dr. Terry and wound care    Stage 3b chronic kidney disease ([facility]) 8/25/2016     Latest Reference Range & Units 04/29/22 11:14 Bun 8 - 22 mg/dL 33 (H) Creatinine 0.50 - 1.40 mg/dL 1.55 (H) GFR (CKD-EPI) >60 mL/min/1.73 m 2 46 ! [1]  He has a history of chronic kidney disease related to nephrolithiasis, recurrent UTIs, and BPH as well as history of hypertension and diabetes.  He is following with nephrology, Dr. Jarvis and urology, Dr. Barry.  Spironolactone was discontinued due     Stroke ([facility]) 2016    left sided weakness    Toenail avulsion, initial encounter- left foot 3rd digit 7/12/2021    They report that his toenail spontaneously came off last week.  He does not recall specific injury or any pain.  His significant other saw the nail on the floor with dried blood on his foot.  She cleaned the wound and covered it with gauze.  On follow-up in clinic today the gauze has stuck to the wound bed but with saline was fairly easy to remove the dried gauze.  There was scant amount of bright    Urinary bladder disorder     Urinary incontinence     pt wears pads    UTI (urinary  tract infection) 5/28/2022    Weakness     Wound of left leg 2012    Requiring surgery and debridment, Dr. Moore       Reviewed Social history and Surgical History    Medications: reviewed on epic.   Outpatient Medications Marked as Taking for the 3/25/24 encounter (Office Visit) with Brett Cantrell D.O.   Medication Sig Dispense Refill    gabapentin (NEURONTIN) 100 MG Cap Take 1-3 Capsules by mouth at bedtime as needed (pain). 90 Capsule 3    HUMALOG KWIKPEN 100 UNIT/ML Solution Pen-injector injection PEN INJECT 5 UNITS UNDER SKIN 3 TIMES A DAY BEFORE MEALS. 5 UNITS FOR SUGARS= 101-150. INCREASE BY 2 UNITS IF>150. CORRECTION DOSAGE IS 2:50>150 9 mL 3    fluoxetine (PROZAC) 40 MG capsule TAKE 1 CAPSULE BY MOUTH EVERY DAY 90 Capsule 3    carvedilol (COREG) 6.25 MG Tab Take 1 Tablet by mouth 2 times a day with meals. 180 Tablet 3    amLODIPine (NORVASC) 10 MG Tab Take 1 Tablet by mouth every day. 90 Tablet 3    Insulin Pen Needle (PEN NEEDLES) 32G X 4 MM Misc 1 Each 5 Times a Day. USE FOR INSULIN SHOTS FIVE TIMES DAILY 500 Each 3    olmesartan (BENICAR) 40 MG Tab Take 1 Tablet by mouth every evening. 90 Tablet 3    allopurinol (ZYLOPRIM) 100 MG Tab Take 1 Tablet by mouth every evening. 100 Tablet 3    Insulin Glargine, 1 Unit Dial, (TOUJEO SOLOSTAR) 300 UNIT/ML Solution Pen-injector Inject 18-25 Units under the skin every day. 7.5 mL 3    Probiotic Product (PROBIOTIC DAILY) Cap Take  by mouth. Floaster      Potassium Chloride CR (MICRO-K) 8 MEQ Cap CR capsule TAKE 1 CAPSULE BY MOUTH EVERY 48 HOURS 45 Capsule 3    clopidogrel (PLAVIX) 75 MG Tab Take 1 Tablet by mouth every day. 90 Tablet 3    atorvastatin (LIPITOR) 80 MG tablet Take 1 Tablet by mouth every evening. 100 Tablet 3    BOTOX 200 units Recon Soln injection       glucose blood (ONETOUCH ULTRA) strip 1 Strip by Other route 3 times a day. 300 Strip 3    finasteride (PROSCAR) 5 MG Tab Take 5 mg by mouth every evening.      Multiple Vitamin (MULTIVITAMIN PO) Take  1 Tablet by mouth every morning.      Cholecalciferol (VITAMIN D) 2000 UNIT Tab Take 2,000 Units by mouth every morning.      magnesium oxide (MAG-OX) 400 MG Tab tablet Take 800 mg by mouth 2 times a day.      omeprazole (PRILOSEC) 20 MG delayed-release capsule Take 1 Capsule by mouth every day. 30 Capsule 2    acetaminophen (TYLENOL) 325 MG Tab Take 2 Tablets by mouth every 6 hours as needed for Mild Pain, Moderate Pain or Fever. 30 Tablet 0     Current Facility-Administered Medications for the 3/25/24 encounter (Office Visit) with Brett Cantrell D.O.   Medication Dose Route Frequency Provider Last Rate Last Admin    onabotulinum toxin A (Botox) injection 200 Units  200 Units Injection Once Brett Cantrell D.O.        onabotulinum toxin A (Botox) injection 200 Units  200 Units Injection Once CLARKE Shi.OKimberly        onabotulinum toxin A (Botox) injection 200 Units  200 Units Injection Once CLARKE Shi.O.        onabotulinum toxin A (Botox) injection 200 Units  200 Units Injection Once Brett Cantrell D.O.            Allergies:   Allergies   Allergen Reactions    Diphenhydramine Anxiety     Pt is able to tolerate  Mg benadryl with less anxiety    Lorazepam Unspecified     Disorientation    Spironolactone Unspecified     Acute kidney injury    Ciprofloxacin Rash     Rash,stomach ache         EXAMINATION   Vitals: /62 (BP Location: Right arm, Patient Position: Sitting, BP Cuff Size: Adult)   Pulse 69   Temp 36 °C (96.8 °F) (Temporal)   Ht 1.829 m (6')   Wt 83.9 kg (185 lb)   SpO2 98%   Physical Exam:     Body Habitus: Body mass index is 25.09 kg/m².  Appearance: Well-groomed, well-nourished, not disheveled  Eyes: No scleral icterus to suggest severe liver disease, no proptosis to suggest severe hyperthyroid  ENT -no obvious auditory deficits, no external lesions, moist mucus membranes   Skin -no rashes or lesions noted. No appreciable skin breakdown on exposed skin areas.    Respiratory-  breathing comfortably  on room air, no audible wheezing, full sentences  Cardiovascular- No lower extremity edema noted.   Psychiatric- alert and oriented, calm, comfortable, cooperative     Musculoskeletal and Neuro:  Gait and station - wheelchair bound.     No change in patient's demeanor with exam.   Grossly normal cranial nerve exam and sensory exam grossly  Coordination grossly intact  Knee right: extension to 50 degrees versus left knee (70degrees).       MEDICAL DECISION MAKING    Medical records review: see under HPI section.     DATA    Labs:   Reviewed    Imaging:   I personally reviewed following images, these are my reads  none    IMAGING radiology reads. I reviewed the following radiology reads   DX-KNEE 3 VIEWS  Order: [id number]  Impression    1.  Moderate degenerative changes of the left knee with a moderate patellar joint effusion.  2.  Degree of chondrocalcinosis raises the suspicion of calcium pyrophosphate deposition disease.  Narrative    XR KNEE LEFT NON TRAUMA:  6/5/2017 4:32 PM    HISTORY: Medial left knee pain    COMPARISON: No comparison available.    TECHNIQUE: Four views of the left knee.    FINDINGS:  Diffuse bony demineralization.  Moderate suprapatellar joint effusion.  No acute fractures.  Moderate degenerative changes of the left knee with marginal osteophyte formation, joint space loss, and chondrocalcinosis.  Curvilinear calcification along the medial femoral condyle likely relates to sequela of prior injury.  Extensive vascular calcifications.    Diagnosis   Visit Diagnoses     ICD-10-CM   1. Spasticity  R25.2   2. Hemiplegia and hemiparesis following unspecified cerebrovascular disease affecting left non-dominant side (HCC)  I69.954   3. Contracture of right knee  M24.561       ASSESSMENT AND PLAN:  Av Allen Lisbeth 78 y.o. male  Patient with historical and clinical evidence consistent with right knee contracture.  Instructed patient to use gabapentin to 100 mg as needed and may increase to 200  or 300 mg at a time to help control pain.  Will obtain knee x-ray..   Extensive discussion regarding treatment options, at this time recommending the following:    Orders Placed This Encounter    DX-KNEE 3 VIEWS Other - Please Comment    onabotulinum toxin A (Botox) injection 200 Units    onabotulinum toxin A (Botox) injection 200 Units    onabotulinum toxin A (Botox) injection 200 Units    onabotulinum toxin A (Botox) injection 200 Units    gabapentin (NEURONTIN) 100 MG Cap    Consent for all Surgical, Special Diagnostic or Therapeutic Procedures         PLAN  I did have an extensive discussion regarding pathology and treatment options with the patient today including medications, injections, physical agents (eg. water, heat, cold, TENS), therapy-based programs (eg. massage, stretching/traction, exercise), alternative modalities (eg. Acupuncture, OMT, chiropractor, etc), and lastly surgical intervention:  -Medications/Modalities:   Previously prescribed medications  -Limitations:    Activity as tolerated and Ambulate with assisted device  -Brace/orthotic/assistive devices: Not indicated at this time  -Therapy (PT/OT/Aquatherapy):  will discuss at next visit to focus on strengthening and stretching and may include physical agents (eg. water, heat, cold, TENS) and/or therapy-based programs (eg. massage, stretching/traction, exercise),  -Home exercise program: encouraged activity  -Office Injections: will consider at a later time  -Diagnostic workup: reviewed today as above; XR  -Interventional program: I would  consider the patient for an epidural steroid injection depending on the results of the above   -Referrals: XR   -Outside records requested:  The patient signed Outside Records Request Form for his outside records including images.     Follow-up:    Post imaging to Return to clinic in 4-6 weeks for follow-up of symptomatology, or sooner as needed for any acute issues.  Patient expressed understanding of the  management plan. Patient (and Family Members) was/were encouraged to call if any worries, issues, problems or concerns prior to the next visit     Please note that this dictation was created using voice recognition software. I have made every reasonable attempt to correct obvious errors but there may be errors of grammar and content that I may have overlooked prior to finalization of this note.    My total time spent caring for the patient on the day of the encounter was 33 minutes.   This does not include time spent on separately billable procedures/tests.        Brett Cantrell,   Physical Medicine and Rehabilitation  [facility]         CALVIN Bunch D.O.   Brett Frost D.O.  Right knee pain

## 2024-03-25 NOTE — PROCEDURES
Procedure Note for Botulinum Injection:    Primary Dx and Secondary Dx:   Visit Diagnoses     ICD-10-CM   1. Spasticity  R25.2   2. Hemiplegia and hemiparesis following unspecified cerebrovascular disease affecting left non-dominant side (HCC)  I69.954         INFORMED CONSENT   The risks and benefits of the procedure were explained to the patient, and all questions were answered. Benefits of the injection include spasticity reduction by decrease in muscle activation following toxin injection. Adverse effects from toxin injection include but are not limited to: excessive weakness, infection, breathing and/or swallowing difficulty.  Common adverse effects from the injection itself are pain and bruising. The patient demonstrated good understanding of risks and benefits and consents to botulinum toxin injection.     Received botulinum toxin product in last 3 mos: no  Take anti-spasticity medications: No   Have recently received abx (aminoglycosides): no  Take muscle relaxants: no  Take allergy/cold medicine:  no  Take a sleep medicine: no  Known NMJ disease: no  Human albumin allergy: no    Pre-procedure time out was performed.     PROCEDURE:  Usual sterile procedure was observed with sterile prep with chlorhexidine. Ultrasound was used for needle guidance for the injections in the following muscles. Ultrasound indicated to allow greater accuracy and to minimize potential damage to nerves, arteries, veins, and injection of unintended muscles. A negative aspiration of blood was obtained, and the following was injected into each muscle.    Needle size used: All 27G 1.5 inch needles     Botulinum Toxin Injection Details   Guidance: US  guidance    Chlorhexidine and sterile alcohol wipes were used to clean each site prior to injection   Medication: botox; Lot#: [id number]; Exp: 2026/05  Vials 3 Total 600units  Medication: Botox sample; Lot#: [id number]; Exp: 2025/04  Vials 1 Total 200units    Botox Plan: I plan to inject to the  LUE and LLE  Botox: left upper extremity + lower extremity  Location Botox Amount in Units   Pec sonya and minor 150 units total (3 locations)   Biceps    200 units (4 locations)   Hamstring - semimembranosus 150 units (3 locations)   Hamstring - semitendinosus  150 units (3 locations)   Hamstring - Bicep femoris long head and short head 150 units (3 locations)   Total Units  800 units    0units of Botox was wasted     Injection sites were cleaned with alcohol and band aid was applied afterwards.  The patient tolerated the procedure well.  There were no complications.     Extremities: 78520 CHEMODENERVATION OF ONE EXTREMITY; 1-4 MUSCLES and 26313 EACH ADDITIONAL EXTREMITY, 1-4 MUSCLES  Injection modifier: 07830 Ultrasound guidance in conjection with chemodenervation    Assessment and Plan:   -Patient with   Visit Diagnoses     ICD-10-CM   1. Spasticity  R25.2   2. Hemiplegia and hemiparesis following unspecified cerebrovascular disease affecting left non-dominant side (HCC)  I69.954   3. Contracture of right knee  M24.561     -Status-post botulinum toxin injection under US guidance today as above, tolerated procedure well.   -Continue/Will refer patient to physical/occupational therapy for aggressive stretching program, development of home stretching program as well.   -Monitor for post-procedural symptoms   -Follow-up in 4-6 weeks for re-evaluation of tone/spasticity.    -Prescribed refill of gabapentin 100 mg to take 1 to 3 tablets 3 times a day as needed.  -Patient is nonambulatory and has not had success with prior wrist braces    Aftercare:    At the injection site you may experience: Bruising, discomfort, drainage, redness/swelling, warmth   If so, you may: Ice/elevate, take Ibuprofen or Tylenol as directed; do not use heat modalities.     Please call our office immediately if you experience any of the following symptoms:     Double or blurred vision, drooping eyelids, hoarseness or change or loss of voice, loss  of bladder control, loss of strength and muscle weakness all over your body, trouble breathing or swallowing, trouble saying words clearly          Please call our office with any additional questions or concerns.     Brett Cantrell,   Physical Medicine and Rehabilitation  [facility]     safety

## 2024-03-29 ENCOUNTER — PATIENT OUTREACH (OUTPATIENT)
Dept: HEALTH INFORMATION MANAGEMENT | Facility: OTHER | Age: 79
End: 2024-03-29
Payer: MEDICARE

## 2024-03-29 DIAGNOSIS — N18.32 TYPE 2 DIABETES MELLITUS WITH STAGE 3B CHRONIC KIDNEY DISEASE, WITH LONG-TERM CURRENT USE OF INSULIN (HCC): ICD-10-CM

## 2024-03-29 DIAGNOSIS — Z79.4 TYPE 2 DIABETES MELLITUS WITH STAGE 3B CHRONIC KIDNEY DISEASE, WITH LONG-TERM CURRENT USE OF INSULIN (HCC): ICD-10-CM

## 2024-03-29 DIAGNOSIS — E11.22 TYPE 2 DIABETES MELLITUS WITH STAGE 3B CHRONIC KIDNEY DISEASE, WITH LONG-TERM CURRENT USE OF INSULIN (HCC): ICD-10-CM

## 2024-03-29 PROCEDURE — 99490 CHRNC CARE MGMT STAFF 1ST 20: CPT | Performed by: INTERNAL MEDICINE

## 2024-03-29 NOTE — PROGRESS NOTES
3/29/24 2:20 pm: Nurse CM outreach call for monthly CCM assessment. VM left requesting a return call to nurse.     4/1/24 1:45 pm: Nurse CM outreach call to pt's spouse for monthly CCM assessment.    Assessment    Spouse reports pt has been picking at an area in his upper left arm. States it is scratched and bruised from pt scratching the area. Spouse reports she has tried to cover area with a dressing after cleaning arm. Spouse reports pt seems to pick at area during the night. Reports during the day pt doesn't scratch area. Spouse states no signs of any infection. States it does look bruised. Spouse states she did show area to pain management provider yesterday. Spouse will continue to monitor arm and will notify nurse FREDO if pt needs appt with PCP. Spouse states she can also contact dermatologist if needed. Spouse reports no other concerns at this time. Reports blood sugars have been in a good range between 85-97. States sometime before bed pt's readings will go to 190's to 250. Reports it may be related to evening meal or snacks. Pt takes Humalog and Toujeo for DM management. Spouse reports no hypoglycemia episodes. Last A1C in good range at 7.3.    Education    Continue to monitor sore area on arm. Notify nurse if pt needing appt to be seen by PCP.   Fall precautions.     Plan of Care and Goals    Reviewed care plan. Nurse updated care plan and goals    Barriers:    Chronic health conditions    Progress:    Continue to work on progress    Next outreach:  One month

## 2024-04-01 SDOH — ECONOMIC STABILITY: INCOME INSECURITY: IN THE PAST 12 MONTHS, HAS THE ELECTRIC, GAS, OIL, OR WATER COMPANY THREATENED TO SHUT OFF SERVICE IN YOUR HOME?: NO

## 2024-04-01 SDOH — SOCIAL STABILITY: SOCIAL NETWORK: ARE YOU MARRIED, WIDOWED, DIVORCED, SEPARATED, NEVER MARRIED, OR LIVING WITH A PARTNER?: MARRIED

## 2024-04-01 SDOH — HEALTH STABILITY: MENTAL HEALTH
STRESS IS WHEN SOMEONE FEELS TENSE, NERVOUS, ANXIOUS, OR CAN'T SLEEP AT NIGHT BECAUSE THEIR MIND IS TROUBLED. HOW STRESSED ARE YOU?: ONLY A LITTLE

## 2024-04-01 SDOH — ECONOMIC STABILITY: HOUSING INSECURITY: IN THE LAST 12 MONTHS, HOW MANY PLACES HAVE YOU LIVED?: 1

## 2024-04-01 SDOH — HEALTH STABILITY: MENTAL HEALTH: HOW OFTEN DO YOU HAVE A DRINK CONTAINING ALCOHOL?: NEVER

## 2024-04-01 SDOH — SOCIAL STABILITY: SOCIAL NETWORK: HOW OFTEN DO YOU GET TOGETHER WITH FRIENDS OR RELATIVES?: TWICE A WEEK

## 2024-04-01 SDOH — ECONOMIC STABILITY: FOOD INSECURITY: WITHIN THE PAST 12 MONTHS, YOU WORRIED THAT YOUR FOOD WOULD RUN OUT BEFORE YOU GOT MONEY TO BUY MORE.: NEVER TRUE

## 2024-04-01 SDOH — SOCIAL STABILITY: SOCIAL NETWORK
DO YOU BELONG TO ANY CLUBS OR ORGANIZATIONS SUCH AS CHURCH GROUPS UNIONS, FRATERNAL OR ATHLETIC GROUPS, OR SCHOOL GROUPS?: NO

## 2024-04-01 SDOH — ECONOMIC STABILITY: INCOME INSECURITY: IN THE LAST 12 MONTHS, WAS THERE A TIME WHEN YOU WERE NOT ABLE TO PAY THE MORTGAGE OR RENT ON TIME?: NO

## 2024-04-01 SDOH — SOCIAL STABILITY: SOCIAL NETWORK: HOW OFTEN DO YOU ATTENT MEETINGS OF THE CLUB OR ORGANIZATION YOU BELONG TO?: NEVER

## 2024-04-01 SDOH — SOCIAL STABILITY: SOCIAL NETWORK: HOW OFTEN DO YOU ATTEND CHURCH OR RELIGIOUS SERVICES?: NEVER

## 2024-04-01 SDOH — HEALTH STABILITY: MENTAL HEALTH: HOW OFTEN DO YOU HAVE 6 OR MORE DRINKS ON ONE OCCASION?: NEVER

## 2024-04-01 SDOH — HEALTH STABILITY: PHYSICAL HEALTH: ON AVERAGE, HOW MANY MINUTES DO YOU ENGAGE IN EXERCISE AT THIS LEVEL?: 0 MIN

## 2024-04-01 SDOH — SOCIAL STABILITY: SOCIAL NETWORK: IN A TYPICAL WEEK, HOW MANY TIMES DO YOU TALK ON THE PHONE WITH FAMILY, FRIENDS, OR NEIGHBORS?: TWICE A WEEK

## 2024-04-01 SDOH — HEALTH STABILITY: PHYSICAL HEALTH: ON AVERAGE, HOW MANY DAYS PER WEEK DO YOU ENGAGE IN MODERATE TO STRENUOUS EXERCISE (LIKE A BRISK WALK)?: 0 DAYS

## 2024-04-01 SDOH — ECONOMIC STABILITY: INCOME INSECURITY: HOW HARD IS IT FOR YOU TO PAY FOR THE VERY BASICS LIKE FOOD, HOUSING, MEDICAL CARE, AND HEATING?: NOT VERY HARD

## 2024-04-01 SDOH — ECONOMIC STABILITY: FOOD INSECURITY: WITHIN THE PAST 12 MONTHS, THE FOOD YOU BOUGHT JUST DIDN'T LAST AND YOU DIDN'T HAVE MONEY TO GET MORE.: NEVER TRUE

## 2024-04-01 SDOH — HEALTH STABILITY: MENTAL HEALTH: HOW MANY STANDARD DRINKS CONTAINING ALCOHOL DO YOU HAVE ON A TYPICAL DAY?: PATIENT DOES NOT DRINK

## 2024-04-01 ASSESSMENT — LIFESTYLE VARIABLES
SKIP TO QUESTIONS 9-10: 1
AUDIT-C TOTAL SCORE: 0

## 2024-04-11 DIAGNOSIS — R25.2 SPASTICITY: ICD-10-CM

## 2024-04-11 DIAGNOSIS — I69.954 HEMIPLEGIA AND HEMIPARESIS FOLLOWING UNSPECIFIED CEREBROVASCULAR DISEASE AFFECTING LEFT NON-DOMINANT SIDE (HCC): ICD-10-CM

## 2024-04-12 ENCOUNTER — HOSPITAL ENCOUNTER (OUTPATIENT)
Dept: RADIOLOGY | Facility: MEDICAL CENTER | Age: 79
End: 2024-04-12
Attending: GENERAL PRACTICE
Payer: MEDICARE

## 2024-04-12 DIAGNOSIS — M24.561 CONTRACTURE OF RIGHT KNEE: ICD-10-CM

## 2024-04-12 PROCEDURE — 73562 X-RAY EXAM OF KNEE 3: CPT | Mod: RT

## 2024-04-15 NOTE — RESULT ENCOUNTER NOTE
Dear Av Bob    I reviewed the results of your test.  There are no urgent findings that require urgent intervention.  We will discuss the results in detail at the follow-up visit.    Brett Cantrell DO

## 2024-04-15 NOTE — RESULT ENCOUNTER NOTE
Dear vA Bob    I reviewed the results of your test.  There are no urgent findings that require urgent intervention.  We will discuss the results in detail at the follow-up visit.    Brett Cantrell DO

## 2024-04-24 RX ORDER — GABAPENTIN 100 MG/1
CAPSULE ORAL
Qty: 90 CAPSULE | Refills: 3 | Status: SHIPPED | OUTPATIENT
Start: 2024-04-24

## 2024-04-27 ENCOUNTER — HOSPITAL ENCOUNTER (OUTPATIENT)
Dept: LAB | Facility: MEDICAL CENTER | Age: 79
End: 2024-04-27
Attending: INTERNAL MEDICINE
Payer: MEDICARE

## 2024-04-27 DIAGNOSIS — I10 ESSENTIAL HYPERTENSION: ICD-10-CM

## 2024-04-27 DIAGNOSIS — D64.9 ANEMIA, UNSPECIFIED TYPE: ICD-10-CM

## 2024-04-27 DIAGNOSIS — N18.2 CKD (CHRONIC KIDNEY DISEASE), STAGE II: ICD-10-CM

## 2024-04-27 DIAGNOSIS — R33.9 URINARY RETENTION: ICD-10-CM

## 2024-04-27 LAB
ANION GAP SERPL CALC-SCNC: 11 MMOL/L (ref 7–16)
BUN SERPL-MCNC: 22 MG/DL (ref 8–22)
CALCIUM SERPL-MCNC: 9.1 MG/DL (ref 8.5–10.5)
CHLORIDE SERPL-SCNC: 100 MMOL/L (ref 96–112)
CO2 SERPL-SCNC: 25 MMOL/L (ref 20–33)
CREAT SERPL-MCNC: 0.99 MG/DL (ref 0.5–1.4)
ERYTHROCYTE [DISTWIDTH] IN BLOOD BY AUTOMATED COUNT: 45.1 FL (ref 35.9–50)
FASTING STATUS PATIENT QL REPORTED: NORMAL
GFR SERPLBLD CREATININE-BSD FMLA CKD-EPI: 78 ML/MIN/1.73 M 2
GLUCOSE SERPL-MCNC: 142 MG/DL (ref 65–99)
HCT VFR BLD AUTO: 42.9 % (ref 42–52)
HGB BLD-MCNC: 14.3 G/DL (ref 14–18)
MCH RBC QN AUTO: 28.3 PG (ref 27–33)
MCHC RBC AUTO-ENTMCNC: 33.3 G/DL (ref 32.3–36.5)
MCV RBC AUTO: 84.8 FL (ref 81.4–97.8)
PLATELET # BLD AUTO: 255 K/UL (ref 164–446)
PMV BLD AUTO: 10.5 FL (ref 9–12.9)
POTASSIUM SERPL-SCNC: 3.9 MMOL/L (ref 3.6–5.5)
RBC # BLD AUTO: 5.06 M/UL (ref 4.7–6.1)
SODIUM SERPL-SCNC: 136 MMOL/L (ref 135–145)
WBC # BLD AUTO: 10.5 K/UL (ref 4.8–10.8)

## 2024-04-27 PROCEDURE — 85027 COMPLETE CBC AUTOMATED: CPT

## 2024-04-27 PROCEDURE — 36415 COLL VENOUS BLD VENIPUNCTURE: CPT

## 2024-04-27 PROCEDURE — 80048 BASIC METABOLIC PNL TOTAL CA: CPT

## 2024-05-02 ENCOUNTER — PATIENT OUTREACH (OUTPATIENT)
Dept: HEALTH INFORMATION MANAGEMENT | Facility: OTHER | Age: 79
End: 2024-05-02
Payer: MEDICARE

## 2024-05-02 DIAGNOSIS — E11.59 HYPERTENSION ASSOCIATED WITH DIABETES (HCC): ICD-10-CM

## 2024-05-02 DIAGNOSIS — E11.22 TYPE 2 DIABETES MELLITUS WITH STAGE 3B CHRONIC KIDNEY DISEASE, WITH LONG-TERM CURRENT USE OF INSULIN (HCC): ICD-10-CM

## 2024-05-02 DIAGNOSIS — I15.2 HYPERTENSION ASSOCIATED WITH DIABETES (HCC): ICD-10-CM

## 2024-05-02 DIAGNOSIS — Z79.4 TYPE 2 DIABETES MELLITUS WITH STAGE 3B CHRONIC KIDNEY DISEASE, WITH LONG-TERM CURRENT USE OF INSULIN (HCC): ICD-10-CM

## 2024-05-02 DIAGNOSIS — N18.32 TYPE 2 DIABETES MELLITUS WITH STAGE 3B CHRONIC KIDNEY DISEASE, WITH LONG-TERM CURRENT USE OF INSULIN (HCC): ICD-10-CM

## 2024-05-02 NOTE — PROGRESS NOTES
5/2/24 11:00 am: Nurse CM outreach call to pt for monthly CCM assessment. Pt's spouse answered telephone. Requesting nurse CM outreach call at a later time as pt just getting day organized. Nurse will follow-up tomorrow on 5/3/24 for follow-up call.     5/3/24 1:30 pm: Nurse CM outreach call to pt/spouse for monthly CCM assessment. Pt's spouse, Usha answered telephone.    Assessment    Spouse reports pt doing okay.  States area on left arm that pt was scratching has improved. Reports pt has not been scratching area and sores have improved. Spouse reports blood sugars have been in a good range Reports 119 this am. Reports pt taking about 26-27 units of Toujeo nightly. Reports no problems with low blood sugar readings. Spouse reports blood pressure readings have been in a good range 120/70 range. Reports pt has follow-up with cardiology on 5/13/24. Spouse reports pt is scheduled to see Dr. Jarvis next week. Labs completed.  Per spouse pt has been waking up in the middle of the night.   Spouse will continue to monitor. Has follow-up with PCP in August. Spouse will contact nurse CM if needing assistance before next appt.     Education    Fall precautions. Continue to monitor blood sugars. Follow-up with specialists.     Plan of Care and Goals    Reviewed care plan and goals.     Barriers:    Chronic health conditions    Progress:    Continue to work on progress    Next outreach:  One month

## 2024-05-06 DIAGNOSIS — N18.32 TYPE 2 DIABETES MELLITUS WITH STAGE 3B CHRONIC KIDNEY DISEASE, WITH LONG-TERM CURRENT USE OF INSULIN (HCC): ICD-10-CM

## 2024-05-06 DIAGNOSIS — Z79.4 TYPE 2 DIABETES MELLITUS WITH STAGE 3B CHRONIC KIDNEY DISEASE, WITH LONG-TERM CURRENT USE OF INSULIN (HCC): ICD-10-CM

## 2024-05-06 DIAGNOSIS — E11.22 TYPE 2 DIABETES MELLITUS WITH STAGE 3B CHRONIC KIDNEY DISEASE, WITH LONG-TERM CURRENT USE OF INSULIN (HCC): ICD-10-CM

## 2024-05-06 RX ORDER — BLOOD SUGAR DIAGNOSTIC
STRIP MISCELLANEOUS
Qty: 300 STRIP | Refills: 3 | Status: SHIPPED | OUTPATIENT
Start: 2024-05-06

## 2024-05-08 ENCOUNTER — APPOINTMENT (OUTPATIENT)
Dept: PHYSICAL MEDICINE AND REHAB | Facility: MEDICAL CENTER | Age: 79
End: 2024-05-08
Payer: MEDICARE

## 2024-05-08 VITALS
DIASTOLIC BLOOD PRESSURE: 72 MMHG | BODY MASS INDEX: 25.06 KG/M2 | HEIGHT: 72 IN | OXYGEN SATURATION: 100 % | WEIGHT: 185 LBS | HEART RATE: 61 BPM | SYSTOLIC BLOOD PRESSURE: 118 MMHG | TEMPERATURE: 96.8 F

## 2024-05-08 DIAGNOSIS — R25.2 SPASTICITY: ICD-10-CM

## 2024-05-08 DIAGNOSIS — I69.954 HEMIPLEGIA AND HEMIPARESIS FOLLOWING UNSPECIFIED CEREBROVASCULAR DISEASE AFFECTING LEFT NON-DOMINANT SIDE (HCC): ICD-10-CM

## 2024-05-08 DIAGNOSIS — M24.561 CONTRACTURE OF RIGHT KNEE: ICD-10-CM

## 2024-05-08 PROCEDURE — 1170F FXNL STATUS ASSESSED: CPT | Performed by: GENERAL PRACTICE

## 2024-05-08 PROCEDURE — 3078F DIAST BP <80 MM HG: CPT | Performed by: GENERAL PRACTICE

## 2024-05-08 PROCEDURE — 1125F AMNT PAIN NOTED PAIN PRSNT: CPT | Performed by: GENERAL PRACTICE

## 2024-05-08 PROCEDURE — G2211 COMPLEX E/M VISIT ADD ON: HCPCS | Performed by: GENERAL PRACTICE

## 2024-05-08 PROCEDURE — 99215 OFFICE O/P EST HI 40 MIN: CPT | Performed by: GENERAL PRACTICE

## 2024-05-08 PROCEDURE — 3074F SYST BP LT 130 MM HG: CPT | Performed by: GENERAL PRACTICE

## 2024-05-08 RX ORDER — BACLOFEN 10 MG/1
5-10 TABLET ORAL NIGHTLY PRN
Qty: 28 TABLET | Refills: 0 | Status: SHIPPED | OUTPATIENT
Start: 2024-05-08 | End: 2024-06-05

## 2024-05-08 RX ORDER — DANTROLENE SODIUM 25 MG/1
CAPSULE ORAL
Qty: 63 CAPSULE | Refills: 0 | Status: SHIPPED | OUTPATIENT
Start: 2024-05-08 | End: 2024-05-08

## 2024-05-08 ASSESSMENT — PATIENT HEALTH QUESTIONNAIRE - PHQ9: CLINICAL INTERPRETATION OF PHQ2 SCORE: 0

## 2024-05-08 ASSESSMENT — FIBROSIS 4 INDEX: FIB4 SCORE: 2.19

## 2024-05-08 ASSESSMENT — PAIN SCALES - GENERAL: PAINLEVEL: 8=MODERATE-SEVERE PAIN

## 2024-05-08 NOTE — PATIENT INSTRUCTIONS
baclofen 5mg nightly; may increase by 5 mg per dose every 7 days based on response and tolerability; maximum dose: 80 mg per day (20 mg 4 times per day).  When needed, instructed patient to take an additional 5 mg if increased pain secondary to muscle spasticity    Do not increase baclofen any faster to minimize potential side effects. A possible side effect is drowsiness while your body is adjusting to the medicine. If you experience any unwanted side effects, decrease the baclofen to the previous dose that you did not have side effects on. Baclofen is a relatively slow acting medicine so you may not feel immediate relief.  Monitor for urine retention, decrease GI motility, changes in mood, caution if respiratory distress history, caution with history of seizure disorder.  Monitor for autonomic dysreflexia.     Stop gabapentin.

## 2024-05-08 NOTE — PROGRESS NOTES
[facility]  [facility]   [address], [address] [place] [address]  Ph: [phone]    SPASTICITY CLINIC    Patient Name: Av Bbo   Patient : 1945  PCP: Madison Bunch D.O.    Referring Physician: No ref. provider found   Reason for Referral: Spasticity Management   Examining Physician: Brett Cantrell DO  Date of Service: 12/10/2023      SUBJECTIVE:   Patient Identification: Av Bob is a 79 y.o. 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 Huff from 22 until 23.     Accompanied by Today: Wife, Usha, assists with history.     Procedures:  2020 right L3-4 and L4-5 transforaminal epidural steroid injection 95% pain relief and follow-up visit Dr Bennett  3/29/2021 right L3-4 and L4-5 transforaminal epidural steroid injection 90% improved postprocedure.  Dr Bennett    History of Present Illness:      2024 since last visit/injection 3/25/24, patient, wife, and reports from physical therapy have not noticed much improvement with the left lower extremity and elbow range of motion.  They have noticed improvement with the hands.  The patient seems to have more pain at night.  He stated that the baclofen in the past caused the patient to be confused but are willing to attempt another trial as during the hospitalization the patient also had a C. difficile infection.  No recent infection, skin breakdown, urinary retention, constipation, or new stressors.  Pain and hygiene are barriers with spasticity. The patient denies any fevers,  chills, chest pain or shortness of breath.      3/25/24 right knee contracture. Nonambulatory. Painful. Typically controlled gabapentin. Wheelchair bound.     12/14/2023 since last visit/injection, patient has reported same improvement.  Same difficulty with ADLs.  AFO L when exercises and ambulates. No wrist splint and discussed with PT/OT and Dr Huff. No skin breakdown. Infrequent constipation but lately easy bowel movements.  No recent infection or new stressors.  The patient denies any fevers, chills, chest pain or shortness of breath. Never slept well at night and sleep during the day and uses TV.   Prior evaluation with Dr Bennett with Gabapentin and effective for neuropathic pain.  Received prior epidural steroid injections but no active back pain at this time.      3/27/23 Botox performed 2/13/23. Addressed bicep and pec, and hamstring. He has a lot more lateral movement given that we did the pec. This has made it easier to get dressed. The left leg feels better. When he stands he can put the put fully on the floor. He is still doing exercises with the therapist. The Botox helped without a doubt. He received a letter from insurance pharmacy benefit saying that the authorization is expiring. He definitely feels better. They are hoping that with this round of Botox and more therapy he will begin to make some progress in taking steps.     8/29/22  - Seroquel had been inefficacious in terms of not helping with agitation at night.   - Sometimes Trazodone helps and sometimes it doesn't.   - Went to ED for AMS and held on antibiotics.   - That night he slept well through the night at ED and up until last night.   - Last night he did get up and sat at end of bed but was unable to get back into bed.   - He didn't sleep from 1 AM to 5 AM and then son came to help him get back in bed.   - Last night he was uncomfortable but didn't seem agitated.   - Labs reviewed - has candidal infection.   - Has guard rails on one  side of the bed.   - ED recommended Trazodone.   - QTc reviewed 477 1/2022  - Has some lab questions.   - Is having some left arm pain only when moving it.      8/10/22  - The elbow is not bothering him at all.   - He has not had the xray done.   - He can extend his elbow a lot better than he had.   - He is doing well with the UE and the   - Having agitation and anxiety at night. Has been given Trazodone occasionally and up until last night that helped him sleep. Since being home has only needed about 4 times.   - Last night he had it and he was awake most of the night and woke Usha up every 2 hours with different pain complaints.   - Everything was okay this morning in terms of no pain complaints. He was awake at about 11.   - Taking Gabapentin 200 mg at bed.   - Does get cramps at night which bothers him.   - Occasionally has used Tramadol for pain, but that is infrequent.   - Tylenol helps with pain at night.   - Has been about a week that he has been seemingly agitated and with anxiety.     6/29/22  - The elbow is not bothering him at all.   - He has not had the xray done.   - He can extend his elbow a lot better than he had.   - He is doing well with the UE and the   - Having agitation and anxiety at night. Has been given Trazodone occasionally and up until last night that helped him sleep. Since being home has only needed about 4 times.   - Last night he had it and he was awake most of the night and woke Usha up every 2 hours with different pain complaints.   - Everything was okay this morning in terms of no pain complaints. He was awake at about 11.   - Taking Gabapentin 200 mg at bed.   - Does get cramps at night which bothers him.   - Occasionally has used Tramadol for pain, but that is infrequent.   - Tylenol helps with pain at night.   - Has been about a week that he has been seemingly agitated and with anxiety.     6/21/21  -Patient presents today for Botox follow-up.  -States that he does notice that a  little looser.  -Wife states that Occupational Therapy is does note that his little looser is well.  -Has not had physical therapy yet, will be going this week for the first time.  -Cognitively doing better off of spasticity agents which have negative cognitive side effects.  -Has had some continued back pain for which he takes gabapentin at night.  Gabapentin makes him feel little bit sluggish the next day.  -States that he bruises easily, is on antiplatelets, however this seems somewhat new.  Follows up with primary care shortly.    1/7/21  - Records Reviewed: S/p R TESI L3-4, L4-5 11/2/20 - patient reported significant improvement 11/23/20.  - No new hospitalizations.   - TESI is still working well. Occasionally has a twinge, but overall improved.   - Patient had immediate relief.   - Sleeping improved, moving better.   - Spasticity continues to be an issue. Has stretch band that he uses to stretch at night.   - States doesn't really have any pain in right arm.   - Saw Urology. PVR was 25cc. Still on Proscar, Hipprex, alfuzosin.  - Performing exercises twice weekly. [facility] Gets exercise therapist as well as PT, but hasnt re-started PT since COVID.   -Does perform stretching of the lower extremity daily.  -Patient has not driven since he had his stroke 4 years ago.  He actually really would like to see if he could return to driving.    HPI from note dated 9/14/2020: Patient has a history of right CVA with residual left hemiparesis 2016, non-Hodgkin's lymphoma who presented for right back, hip, leg pain and paresthesias.  This has happened a couple of times in the past.  The first time was in 2005 when he went to a high school reunion and was sitting in the bleachers for 3 hours.  At that time he did get an MRI of his lumbar spine and had had a remote history of injections prior to that.  He states the injections to his back helped him significantly, he does not remember the provider that did them.   This symptomatology occurred again when [place]. Got out of car in [place] and when got out to stretch legs that when it started hurting.  States it took many months for this to heal.  It finally did and had been pain free for about a year until 3 weeks ago when flared again. Starts in low back on right, down into hip. Also states has lower leg cramping and foot cramping. Pain travels from R low back laterally into hip, posteriorly down proximal leg, posteriorly with cramping in lower leg, and right foot falls asleep. Cannot target specifically where numbness is.  States that numbness in right lower extremity only worsened with the acute onset of his back pain 3 weeks ago.  Might have intermittently had numbness in the past but significantly increased but this acute onset of low back pain 3 weeks ago. Percentage scale: 50% in back and 50% in leg. Mostly leg bothers him at night. Saw PCP and removed statin and increased Tylenol 1000mg TID. Sometimes the foot feels like it is falling asleep.  Complicating the clinical picture is that the patient has PVD in the right, declined angioplasty in the past. Patient states that he has no saddle anesthesia.  He states that his bowel movements are regular.  He states that he does have difficulty with urination in the sense that he wears adult briefs and sometimes urinates and has no idea he is doing so.  He does have history of BPH and is on bladder medications.     9/28/20   -On today's visit patient states that when he was last asked if he could pinpoint a specific pattern within the foot where he feels numbness he states that it is mostly in the great toe.    -There has been no significant events since we last visited a couple weeks ago.  - He continues to use a nonstandard wheelchair for locomotion primarily.    Current Spasticity Medications and Dosages:  none    Past Spasticity Medications and Dosages:  Baclofen d/t  cognitive effects    Previous Spasticity Injection History:  3/25/24, 8/24/23  Botox: left upper extremity + lower extremity  Location Botox Amount in Units   Pec sonya and minor 150 units total (3 locations)   Biceps    200 units (4 locations)   Hamstring - semimembranosus 150 units (3 locations)   Hamstring - semitendinosus  150 units (3 locations)   Hamstring - Bicep femoris long head and short head 150 units (3 locations)   Total Units  800 units         200 units of SAMPLES used to get better movement from PEC and HAMSTRING    5/25/23, 2/13/23  Botox: left upper extremity + lower extremity  Location Botox Amount in Units   Pec sonya and minor 50 units each muscle (100 total)   Biceps    200 units (4 locations)   Hamstring - semimembranosus 100 units (2 locations)   Hamstring - semitendinosus  100 units (2 locations)   Hamstring - Bicep femoris long head and short head 100 units (2 locations)   Total Units  600 units      10/24/22, 6/29/22  Botox: left upper extremity  Location Botox Amount in Units   Biceps    400 units   Total Units  400 units       8/12/21:   Botox: left upper extremity  Location Botox Amount in Units   Biceps    50 units   FCR  75 units   FCU  75 units   Pronator teres   50 units   FDS   75 units   FDP   75 units   Total Units  400 units      5/11/21  Botox: left upper extremity and left lower extremity  Location Botox Amount in Units   Left semimembranosus  75 units   Left semitendinosus  75 units   Left biceps femoris  75 units   Left bicep  75 units   Left FCR  50 units   Left FCU  50 units   Total Units  400 units        Review of Systems:  Red Flags ROS:   Fever, Chills, Sweats: Denies  Involuntary Weight Loss: Denies  Bladder Incontinence: Denies  Bowel Incontinence: denies  Saddle Anesthesia: Denies  Falls: denies  progressive motor weakness: denies  All other systems reviewed and negative.     Past Medical History:  Past Medical History:   Diagnosis Date    Hermann 9/22/2022    For the  past 6 months or so he has been experiencing significant pain with defecation.  Does not happen every time.  His wife who is his primary caregiver notes there is no hard stool when he has a painful episodes.  He also is having stool incontinence sometimes when he coughs and other times he will pass a full bowel movement and not be aware.  He has not yet seen GI for this problem.    Hospital discharge follow-up 6/27/2022    Av was admitted to the hospital from 5/28 to 5/31/2022 for weakness and sepsis related to urinary tract infection.  He was discharged to the [facility] rehab for 3 weeks to try to improve mobility and ADLs due to prior stroke.  This was complicated by C. difficile infection as well as lower extremity DVTs.  Diarrhea resolved with oral vancomycin.  Xarelto was changed from prophylaxis to treatment     Acute deep vein thrombosis (DVT) of RLE and partial DVT of LLE 6/19/2022    US LE (6/2022):   1.  Acute appearing RIGHT posterior tibial vein DVT.  2.  Subacute appearing LEFT common femoral vein DVT and proximal femoral   vein junction DVT.   He had leg pain and ultrasound of bilateral lower extremities was completed which was positive for new DVT, acute in the right posterior tibial vein and subacute in the left common femoral and proximal femoral vein junction as noted    UTI (urinary tract infection) 5/28/2022    Pyelonephritis 5/24/2022    Av had abrupt onset of illness starting on Sunday.  He felt unwell and then had projectile vomiting x3 episodes after dinner that evening.  He had associated nausea but no overt abdominal pain.  He has continued to have anorexia and is having difficulty with his p.o. intake.  He did get in some fruit in a month and yesterday and about 50 to 60 ounces of water.  He has chronic urinary retention    Acute prostatitis 1/13/2022    [facility]uria with rectal pain.  He followed up with his  urology PA and was placed back on Bactrim.  He was recently on Bactrim and has a history of recurrent urinary tract infections related to neurogenic bladder from prior stroke.  Rectal pain is dissipated however he continues to have hematuria.  He had associated CATA o    Exposure to SARS-associated coronavirus 10/13/2021    He reports viral illness last week with runny nose, congestion, productive cough, and wheezing.  He went to a play of his grandson and may have been exposed to Covid.  He was feeling very bad last Friday and over the weekend and now is at least 50% better.    Hordeolum externum of left lower eyelid 9/14/2021    He reports to clinic with 3 weeks of erythema and pain of the left lower eyelid. No clear inciting cause. Reports no globe pain. Lower > upper eyelid swelling and erythema. After 1.5 weeks had drainage of purulence from the lower medial eyelid. No photosensitivity. Using warm compresses. No changes in visual acuity. Saw retinal specialist around day 1 or 2 of symptoms who felt it could be related     Toenail avulsion, initial encounter- left foot 3rd digit 7/12/2021    They report that his toenail spontaneously came off last week.  He does not recall specific injury or any pain.  His significant other saw the nail on the floor with dried blood on his foot.  She cleaned the wound and covered it with gauze.  On follow-up in clinic today the gauze has stuck to the wound bed but with saline was fairly easy to remove the dried gauze.  There was scant amount of bright    Pain 06/30/2021    right leg foot    Hypertension 06/30/2021    pt states well controlled on meds    Dysphagia 3/15/2021    He reports progressive worsening of his swallow function.  He notices at least once per week he is choking and coughing, can be with pills or can be with food.  The episodes are quite frightening and he takes a bit of time for him to recover.  He has a prior history of stroke and recalls working with  speech-language pathology at that time but does not remember if he did any formal esophagrams or s    Roberto hematuria 1/29/2020    Renal cyst 1/29/2020    Influenza A 1/26/2020    Leg edema, right 1/22/2020    Acute on chronic renal failure (HCC) 1/21/2020    Renal mass 1/21/2020    Hydronephrosis 1/20/2020    History of renal calculi 11/19/2019    Diarrhea 7/2/2019    Acute cystitis without hematuria 6/30/2019    Muscle strain of right gluteal region 5/20/2019    Left hand weakness 5/20/2019    ([facility]) Chronic ulcer of right lower extremity with fat layer exposed 12/27/2018    Enterococcal septicemia ([facility]) 8/12/2017    Hypomagnesemia 08/12/2017    etiology uncertain    NSTEMI (non-ST elevated myocardial infarction) ([facility]) 07/18/2017    complicating UTI with sepsis    Acute respiratory failure with hypoxia ([facility]) 5/20/2017    Septic shock ([facility]) 5/20/2017    Normocytic hypochromic anemia 5/20/2017    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 [1]  He has a history of chronic kidney disease related to nephrolithiasis, recurrent UTIs, and BPH as well as history of hypertension and diabetes.  He is following with nephrology, Dr. Jarvis and urology, Dr. Barry.  Spironolactone was discontinued due     Stroke ([facility]) 2016    left sided weakness    Skin ulcer of calf (HCC) 2015    Right, Dr. Terry and wound care    Controlled gout 2014    Polyneuropathy in diabetes(357.2) 9/11/2013    Hypokalemia 2012     controlled with combination of ACE inhibitor or ARB plus spironolactone    Wound of left leg 2012    Requiring surgery and debridment, Dr. Moore    Nephrolithiasis 2006    right kidney subsequent lithotripsy by Dr. Barry    CAD (coronary artery disease)     GIOVANNA to RCA in '97, GIOVANNA X2 to LAD and GIOVANNA X2 to OM in '09    Cancer (HCC)     2017; chemo lympoma non hodgkins lymphoma    Cataract     dez iol    Chickenpox     CKD (chronic kidney disease) stage 3, GFR 30-59 ml/min     Coronary atherosclerosis of native coronary artery     S/P PTCA (percutaneous transluminal coronary angioplasty), RCA, 5/1997, patent on cath 7/10/2009 at the time of interventions on his left anterior descending and circumflex coronary arteries    Daytime sleepiness     Depression     Diabetes (HCC)     Difficulty swallowing     Frequent urination     GERD (gastroesophageal reflux disease)     Polish measles     Insomnia     Kidney stone     Mixed hyperlipidemia     Peripheral vascular disease (HCC)     Urinary bladder disorder     Urinary incontinence     pt wears pads    Weakness       Allergies   Allergen Reactions    Diphenhydramine Anxiety     Pt is able to tolerate  Mg benadryl with less anxiety    Lorazepam Unspecified     Disorientation    Spironolactone Unspecified     Acute kidney injury    Ciprofloxacin Rash     Rash,stomach ache        Past Social History:  Social History     Socioeconomic History    Marital status:      Spouse name: Not on file    Number of children: Not on file    Years of education: Not on file    Highest education level: Not on file   Occupational History    Not on file   Tobacco Use    Smoking status: Never    Smokeless tobacco: Never   Vaping Use    Vaping Use: Never used   Substance and Sexual Activity    Alcohol use: Never    Drug use: Never    Sexual activity: Not Currently     Partners: Female     Comment: , one daughter, 2 grands   Other Topics Concern     Service Yes    Blood  Transfusions No    Caffeine Concern No    Occupational Exposure No    Hobby Hazards No    Sleep Concern Yes    Stress Concern No    Weight Concern No    Special Diet No    Back Care No    Exercise Yes    Bike Helmet No    Seat Belt Yes    Self-Exams Yes   Social History Narrative    Av is from [place], [place] and raised in [place]. He has been in [place] since 2004. 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  He has been  to Usha since 2003, they met through a mutual friend. He has a daughter (Kalina) and a step son (Jimi).     Social Determinants of Health     Financial Resource Strain: Low Risk  (4/1/2024)    Overall Financial Resource Strain (CARDIA)     Difficulty of Paying Living Expenses: Not very hard   Food Insecurity: No Food Insecurity (4/1/2024)    Hunger Vital Sign     Worried About Running Out of Food in the Last Year: Never true     Ran Out of Food in the Last Year: Never true   Transportation Needs: No Transportation Needs (4/1/2024)    PRAPARE - Transportation     Lack of Transportation (Medical): No     Lack of Transportation (Non-Medical): No   Physical Activity: Inactive (4/1/2024)    Exercise Vital Sign     Days of Exercise per Week: 0 days     Minutes of Exercise per Session: 0 min   Stress: No Stress Concern Present (4/1/2024)    [employer] - Occupational Stress Questionnaire     Feeling of Stress : Only a little   Social Connections: Moderately Isolated (4/1/2024)    Social Connection and Isolation Panel [NHANES]     Frequency of Communication with Friends and Family: Twice a week     Frequency of Social Gatherings with Friends and Family: Twice a week     Attends Rastafari Services: Never     Active Member of Clubs or  Organizations: No     Attends Club or Organization Meetings: Never     Marital Status:    Intimate Partner Violence: Not on file   Housing Stability: Low Risk  (4/1/2024)    Housing Stability Vital Sign     Unable to Pay for Housing in the Last Year: No     Number of Places Lived in the Last Year: 1     Unstable Housing in the Last Year: No        Family History:  Family History   Problem Relation Age of Onset    Heart Disease Father         CAD    Diabetes Father     Cancer Mother     Psychiatric Illness Mother         Depression    Depression Mother     Kidney stones Brother     Heart Disease Brother     Psychiatric Illness Brother         Depression    Depression Brother     Suicide Attempts Other     Psychiatric Illness Other         autism         OBJECTIVE:   Vital Signs:  Virtual visit    Physical Exam:   Body Habitus: Body mass index is 25.09 kg/m². Virtual Visit  Appearance: Well-groomed, well-nourished, not disheveled  Eyes: No scleral icterus to suggest severe liver disease, no proptosis to suggest severe hyperthyroid  ENT -no obvious auditory deficits, no external lesions, moist mucus membranes   Skin - bandaids on his knee for safety.  no rashes or lesions noted. No appreciable skin breakdown on exposed skin areas.    Respiratory-  breathing comfortably on room air, no audible wheezing, full sentences  Cardiovascular- No lower extremity edema noted.   Psychiatric- alert and oriented,  calm, comfortable, cooperative   Gait - video;   Bilateral knee: No tenderness to palpation, decreased active and passive range of motion.  No instability  Neuromuscular- Awake alert.  Conversational.  Logical thought content.  Botox has worn off. His  spasticity has returned.  Difficult to see if L Foot Drop.  L Wrist drop  Left elbow always in flexion at 90d    Tone on Modified Deuce Scale    L R  R L   Elbow extension (testing tone of elbow flexors) 3  Hip extension (testing hip flexors)     Elbow flexion  (testing tone of elbow extensors) 0  Hip abduction (testing adductors)     Wrist extension (testing tone of wrist flexors)  <3  Knee extension (testing knee flexors) 3 3   Finger extension (testing tone of finger flexors) 0  Knee flexion (testing knee extensors)  1   Supination (testing forearm pronators) 3  Dorsiflexion (testing plantarflexors)  0      Plantarflexion (testing dorsiflexors)  1       Prior exam  Tone on Modified Deuce Scale    L R  R L   Elbow extension (testing tone of elbow flexors) 3   Hip extension (testing hip flexors)     Elbow flexion (testing tone of elbow extensors) 0  Hip abduction (testing adductors)     Wrist extension (testing tone of wrist flexors)  3  Knee extension (testing knee flexors)  3   Finger extension (testing tone of finger flexors) 1  Knee flexion (testing knee extensors)  1   Supination (testing forearm pronators) 3  Dorsiflexion (testing plantarflexors)  0      Plantarflexion (testing dorsiflexors)  1         Pertinent Labs:  Lab Results   Component Value Date/Time    SODIUM 136 04/27/2024 11:24 AM    POTASSIUM 3.9 04/27/2024 11:24 AM    CHLORIDE 100 04/27/2024 11:24 AM    CO2 25 04/27/2024 11:24 AM    GLUCOSE 142 (H) 04/27/2024 11:24 AM    BUN 22 04/27/2024 11:24 AM    CREATININE 0.99 04/27/2024 11:24 AM    CREATININE 1.4 05/28/2008 05:42 PM    BUNCREATRAT 22.0 03/01/2022 02:22 PM    BUNCREATRAT 10 03/27/2017 02:11 PM       Lab Results   Component Value Date/Time    WBC 10.5 04/27/2024 11:24 AM    RBC 5.06 04/27/2024 11:24 AM    HEMOGLOBIN 14.3 04/27/2024 11:24 AM    HEMATOCRIT 42.9 04/27/2024 11:24 AM    MCV 84.8 04/27/2024 11:24 AM    MCH 28.3 04/27/2024 11:24 AM    MCHC 33.3 04/27/2024 11:24 AM    MPV 10.5 04/27/2024 11:24 AM    NEUTSPOLYS 81.10 (H) 08/25/2022 01:47 PM    LYMPHOCYTES 9.50 (L) 08/25/2022 01:47 PM    MONOCYTES 6.10 08/25/2022 01:47 PM    EOSINOPHILS 2.10 08/25/2022 01:47 PM    BASOPHILS 0.30 08/25/2022 01:47 PM    HYPOCHROMIA 1+ 03/21/2012 01:08 PM        Lab Results   Component Value Date/Time    ASTSGOT 37 01/04/2023 02:39 PM    ALTSGPT 26 01/04/2023 02:39 PM       Imaging:   I personally reviewed following images, these are my reads  CT head without contrast 11/20/2021: no hemorrhage or evidence for acute infarct.   R knee XR 4/12/24: mod Tricompartment osteoarthritis. Chondrocalcinosis. Popliteal calcification    IMAGING radiology reads. I reviewed the following radiology reads     4/12/2024 11:32 AM     HISTORY/REASON FOR EXAM:  Right knee pain.     TECHNIQUE/EXAM DESCRIPTION AND NUMBER OF VIEWS: 3 views of the RIGHT knee.     COMPARISON: None     FINDINGS:  Bone density is osteopenic There is no evidence of fracture or dislocation. There is tricompartment degenerative change characterized by osteophytic spurring and joint space loss. There is chondrocalcinosis. There is vascular calcification. There is no   joint effusion.     IMPRESSION:     1.  Tricompartment osteoarthritis.     2.  Chondrocalcinosis.        Results for orders placed during the hospital encounter of 11/20/18    MR-BRAIN-W/O    Impression  1.  Moderate cerebral atrophy.  2.  Old infarcts left head of caudate nucleus and left frontal corona radiata.  3.  Mild supratentorial white matter disease most consistent with microvascular ischemic change.  4.  Tiny curvilinear focus of encephalomalacia in the right anterior quadrant of the medulla most consistent with an old lacunar size brainstem infarct.  5.  Mild pontine ischemic gliosis.  6.  No evidence of acute infarction, acute hemorrhage, or mass lesion.      Results for orders placed during the hospital encounter of 11/11/23    MR-ABDOMEN-WITH & W/O    Impression  1.  Cyst which is decreased in size lateral upper pole left kidney region with surrounding complex fluid appears nearly completely resolved. This most likely represented a ruptured exophytic cyst arising laterally from the upper pole of the left kidney.  Surrounding hemorrhage seen  on the prior CT appears nearly completely resolved..    2.  There is no evidence of significant complex cyst or cysts containing enhancement or solid component on the current exam.    3.  No solid mass or enhancing mass is identified.    4.  No change in moderate bilateral hydronephrosis and hydroureter.    5.  No adenopathy identified.             Results for orders placed during the hospital encounter of 09/24/20    MR-LUMBAR SPINE-W/O    Impression  1.  Multifocal degenerative disease in the lumbar spine as described above.  2.  Bilateral hydronephrosis and hydroureter. This is noted on the previous CT scan dated 1/21/2020.  3.  There is small focus of sclerosis in the L4 vertebral body. This is new since the previous study. This finding likely secondary to the degeneration. However if there is a history of carcinoma the possibility of sclerotic metastasis cannot be  excluded.        Results for orders placed during the hospital encounter of 09/24/20    MR-LUMBAR SPINE-W/O    Impression  1.  Multifocal degenerative disease in the lumbar spine as described above.  2.  Bilateral hydronephrosis and hydroureter. This is noted on the previous CT scan dated 1/21/2020.  3.  There is small focus of sclerosis in the L4 vertebral body. This is new since the previous study. This finding likely secondary to the degeneration. However if there is a history of carcinoma the possibility of sclerotic metastasis cannot be  excluded.                                          Results for orders placed in visit on 07/23/20    DX-CHEST-2 VIEWS    Impression  No acute cardiopulmonary disease.                                             ASSESSMENT/PLAN: Av Bob  is a 79y.o. 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 The following plan was discussed with the patient who is in agreement.     Visit Diagnoses     ICD-10-CM   1. Spasticity  R25.2   2. Hemiplegia and hemiparesis following unspecified cerebrovascular disease affecting left non-dominant side (HCC)  I69.954   3. Contracture of right knee  M24.561           Wife, Usha, assists with history.     Rehab/Neuro:   1. 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  He is able to put his foot flat on the ground to stand.  It is also easier for him to do ADLs such as dressing due to the increase in flexibility in his shoulder abduction.  -Brace/orthotic/assistive devices: AFO L when exercises and ambulates. No wrist splint and discussed with PT/OT. No new ADs    Spasticity secondary to stroke  5/8/2024: Obvious improvement with finger extension and wrist extension and bicep flexion but not tricep extension or knee extension  Medication Management: Trial 5 mg baclofen and provided instructions will have patient follow-up in 1 month.  Discontinue gabapentin  We will proceed with ordering Botox for management of spasticity. The following muscles will be targeted with the corresponding Botox Units.   Botox significantly helped with left elbow pain.  - Referral: Physiatry for repeat Botox injections.  Patient will benefit from Botox injections in conjunction of use with baclofen to Improved ADLs and mobility.  Would benefit from continued Botox injections to capitalize on spasticity control that we have gained thus far.    Okay to continue antiplatelet with plavix through the procedure for botulinum toxin injection.  The risks of going off the medication outweigh the risks of doing the procedure on the  medication.  I will plan to use 27-gauge needles and ultrasound guidance to avoid all blood vessels during the procedure.  We discussed that while on anticoagulation the patient does have an increased risk of bleeding and hematoma     Botox: left upper extremity + lower extremity  Location Botox Amount in Units   Pec sonya and minor 100 units total (3 locations)   Biceps    200 units (4 locations)   Hamstring - semimembranosus 100 units (3 locations)   Hamstring - semitendinosus  100 units (3 locations)   Hamstring - Bicep femoris long head and short head 100 units (3 locations)   Total Units  600 units        The risks benefits and alternatives to this procedure were discussed and the patient wishes to proceed with the procedure. Risks include but are not limited to damage to surrounding structures, infection, bleeding, worsening of pain which can be permanent, weakness which can be permanent. Benefits include pain relief, improved function. Alternatives includes not doing the procedure.       Guidance: EMG/US - For appropriate identification and targeting of spastic muscles to be injected.     Other:  Dietary and exercise counseling provided  Insomnia  right knee contracture.  X-ray revealed no acute processes such as fractures but did reveal osteoarthritis; suspect pain is likely due to spasticity and will add baclofen  Lumbar radiculopathy and neuropathic pain: Primary transforaminal epidural steroid injections with Dr. Bennett; currently using gabapentin at night for pain control; will continue to monitor  History of vitamin D deficiency: Current level is 40 on April 5, 2023; continue supplementation and  follow-up with PCP for management as this may affect tolerance of therapy  Diabetic with CKD 3: Continue follow-up with PCP for management    Follow-up: Follow-up as restarting baclofen, or sooner as needed for any acute issues.  Patient expressed understanding of the management plan. Patient (and Family  Members) was/were encouraged to call if any worries, issues, problems or concerns prior to the next visit     Please note that this dictation was created using voice recognition software. I have made every reasonable attempt to correct obvious errors but there may be errors of grammar and content that I may have overlooked prior to finalization of this note.    My total time spent caring for the patient on the day of the encounter was 43 minutes.   This does not include time spent on separately billable procedures/tests.        Brett Cantrell DO  Department of Physical Medicine and Rehabilitation  [facility]         CALVIN Bunch D.O.   CC No ref. provider found

## 2024-05-09 ENCOUNTER — TELEMEDICINE (OUTPATIENT)
Dept: NEPHROLOGY | Facility: MEDICAL CENTER | Age: 79
End: 2024-05-09
Payer: MEDICARE

## 2024-05-09 VITALS
HEART RATE: 71 BPM | BODY MASS INDEX: 25.06 KG/M2 | TEMPERATURE: 97.3 F | SYSTOLIC BLOOD PRESSURE: 130 MMHG | WEIGHT: 185 LBS | HEIGHT: 72 IN | OXYGEN SATURATION: 96 % | DIASTOLIC BLOOD PRESSURE: 70 MMHG

## 2024-05-09 DIAGNOSIS — E11.29 MICROALBUMINURIA DUE TO TYPE 2 DIABETES MELLITUS (HCC): ICD-10-CM

## 2024-05-09 DIAGNOSIS — R80.9 MICROALBUMINURIA DUE TO TYPE 2 DIABETES MELLITUS (HCC): ICD-10-CM

## 2024-05-09 DIAGNOSIS — N18.2 CKD (CHRONIC KIDNEY DISEASE), STAGE II: ICD-10-CM

## 2024-05-09 DIAGNOSIS — D64.9 ANEMIA, UNSPECIFIED TYPE: ICD-10-CM

## 2024-05-09 DIAGNOSIS — I10 ESSENTIAL HYPERTENSION: ICD-10-CM

## 2024-05-09 DIAGNOSIS — E55.9 VITAMIN D DEFICIENCY: ICD-10-CM

## 2024-05-09 PROCEDURE — 99213 OFFICE O/P EST LOW 20 MIN: CPT | Mod: 95 | Performed by: INTERNAL MEDICINE

## 2024-05-09 ASSESSMENT — ENCOUNTER SYMPTOMS
COUGH: 0
SINUS PAIN: 0
HEMOPTYSIS: 0
SHORTNESS OF BREATH: 0
VOMITING: 0
FEVER: 0
PALPITATIONS: 0
WEIGHT LOSS: 0
ORTHOPNEA: 0
NAUSEA: 0
CHILLS: 0
ABDOMINAL PAIN: 0
WHEEZING: 0
EYES NEGATIVE: 1

## 2024-05-09 ASSESSMENT — FIBROSIS 4 INDEX: FIB4 SCORE: 2.19

## 2024-05-09 NOTE — PROGRESS NOTES
Telemedicine: Established Patient   This evaluation was conducted via Zoom using secure and encrypted videoconferencing technology. The patient was in their home in the [facility].    The patient's identity was confirmed and verbal consent was obtained for this virtual visit.    Subjective:   CC:   Chief Complaint   Patient presents with    Chronic Kidney Disease       Av Bob is a 79 y.o. male presenting for evaluation and management of:CKD II  Doing well, no complaints  No dysuria, positive for incontinence -f/u with Urology  CKD II -creat stable 0,9 -baseline  HTN: BP well controlled - monitored at home  Anemia: Hb stable - WNL  Vit D and PTH very well controlled -at goal    Review of Systems   Constitutional:  Negative for chills, fever, malaise/fatigue and weight loss.   HENT:  Negative for congestion, hearing loss and sinus pain.    Eyes: Negative.    Respiratory:  Negative for cough, hemoptysis, shortness of breath and wheezing.    Cardiovascular:  Negative for chest pain, palpitations, orthopnea and leg swelling.   Gastrointestinal:  Negative for abdominal pain, nausea and vomiting.   Genitourinary:  Negative for dysuria.   Skin: Negative.    All other systems reviewed and are negative.        Allergies   Allergen Reactions    Diphenhydramine Anxiety     Pt is able to tolerate  Mg benadryl with less anxiety    Lorazepam Unspecified     Disorientation    Spironolactone Unspecified     Acute kidney injury    Ciprofloxacin Rash     Rash,stomach ache       Current medicines (including changes today)  Current Outpatient Medications   Medication Sig Dispense Refill    baclofen (LIORESAL) 10 MG Tab Take 0.5-1 Tablets by mouth at bedtime as needed (spasms) for up to 28 days. 28 Tablet 0    ONETOUCH ULTRA strip USE 1 STRIP BY OTHER ROUTE 3 TIMES A  Strip 3    gabapentin (NEURONTIN) 100 MG Cap TAKE 1 CAPSULE BY MOUTH THREE TIMES DAILY AS NEEDED FOR PAIN 90 Capsule 3    HUMALOG KWIKPEN 100  UNIT/ML Solution Pen-injector injection PEN INJECT 5 UNITS UNDER SKIN 3 TIMES A DAY BEFORE MEALS. 5 UNITS FOR SUGARS= 101-150. INCREASE BY 2 UNITS IF>150. CORRECTION DOSAGE IS 2:50>150 9 mL 3    fluoxetine (PROZAC) 40 MG capsule TAKE 1 CAPSULE BY MOUTH EVERY DAY 90 Capsule 3    carvedilol (COREG) 6.25 MG Tab Take 1 Tablet by mouth 2 times a day with meals. 180 Tablet 3    amLODIPine (NORVASC) 10 MG Tab Take 1 Tablet by mouth every day. 90 Tablet 3    Insulin Pen Needle (PEN NEEDLES) 32G X 4 MM Misc 1 Each 5 Times a Day. USE FOR INSULIN SHOTS FIVE TIMES DAILY 500 Each 3    olmesartan (BENICAR) 40 MG Tab Take 1 Tablet by mouth every evening. 90 Tablet 3    allopurinol (ZYLOPRIM) 100 MG Tab Take 1 Tablet by mouth every evening. 100 Tablet 3    Insulin Glargine, 1 Unit Dial, (TOUJEO SOLOSTAR) 300 UNIT/ML Solution Pen-injector Inject 18-25 Units under the skin every day. 7.5 mL 3    Probiotic Product (PROBIOTIC DAILY) Cap Take  by mouth. Floaster      Potassium Chloride CR (MICRO-K) 8 MEQ Cap CR capsule TAKE 1 CAPSULE BY MOUTH EVERY 48 HOURS 45 Capsule 3    clopidogrel (PLAVIX) 75 MG Tab Take 1 Tablet by mouth every day. 90 Tablet 3    atorvastatin (LIPITOR) 80 MG tablet Take 1 Tablet by mouth every evening. 100 Tablet 3    BOTOX 200 units Recon Soln injection       finasteride (PROSCAR) 5 MG Tab Take 5 mg by mouth every evening.      Multiple Vitamin (MULTIVITAMIN PO) Take 1 Tablet by mouth every morning.      Cholecalciferol (VITAMIN D) 2000 UNIT Tab Take 2,000 Units by mouth every morning.      magnesium oxide (MAG-OX) 400 MG Tab tablet Take 800 mg by mouth 2 times a day.      omeprazole (PRILOSEC) 20 MG delayed-release capsule Take 1 Capsule by mouth every day. 30 Capsule 2    acetaminophen (TYLENOL) 325 MG Tab Take 2 Tablets by mouth every 6 hours as needed for Mild Pain, Moderate Pain or Fever. 30 Tablet 0     No current facility-administered medications for this visit.       Patient Active Problem List     Diagnosis Date Noted    Cerebral atrophy (HCC) 08/21/2023    Confusion 06/30/2023    Contracture of muscle of left upper arm- s/p serial casting, severe left elbow pain 06/27/2022    Gout 06/07/2022    Vitamin D insufficiency 06/01/2022    Pressure injury of buttock, stage 1 02/17/2022    Right posterior hip pain and right leg cramping 09/09/2020    ASHLEY (obstructive sleep apnea) 08/27/2020    Nocturnal hypoxemia 07/06/2020    Chronic fatigue 06/09/2020    Essential hypertension, benign 05/06/2020    Polypharmacy 02/04/2020    Benign prostatic hyperplasia with urinary obstruction 01/29/2020    Altered mobility due to old stroke 01/22/2020    Neurogenic bladder 11/19/2019    PVD (peripheral vascular disease) ([facility]) 10/08/2019    GERD with esophagitis 10/19/2018    ([facility]) Left hemiparesis 12/27/2017    Psychophysiological insomnia 11/21/2017    ([facility]) Moderate episode of recurrent major depressive disorder 11/07/2017    Wheelchair dependent 11/07/2017    Hypomagnesemia 08/12/2017    Paroxysmal atrial fibrillation ([facility]) 05/23/2017    Iron deficiency anemia 05/20/2017    H/O non-Hodgkin's lymphoma 05/08/2017    ([facility]) Hypertension associated with diabetes 03/22/2017    Type 2 diabetes mellitus with stage 3b chronic kidney disease, with long-term current use of insulin ([facility]) 12/30/2016    H/O Cerebrovascular accident (CVA) in adulthood 12/30/2016    Coronary artery disease involving native coronary artery 12/30/2016    Idiopathic chronic gout of multiple sites without tophus 01/28/2014    Presence of BMS and drug coated stents in LAD coronary artery 12/13/2011    Presence of drug coated stents in left circumflex coronary artery 12/13/2011    Status post percutaneous transluminal coronary angioplasty 12/13/2011    ([facility]) Hyperlipidemia associated with type 2 diabetes mellitus 12/13/2011       Family History   Problem Relation Age of Onset    Heart Disease Father         CAD    Diabetes Father     Cancer Mother     Psychiatric  Illness Mother         Depression    Depression Mother     Kidney stones Brother     Heart Disease Brother     Psychiatric Illness Brother         Depression    Depression Brother     Suicide Attempts Other     Psychiatric Illness Other         autism       He  has a past medical history of ([facility]) Chronic ulcer of right lower extremity with fat layer exposed (12/27/2018), Acute cystitis without hematuria (6/30/2019), Acute deep vein thrombosis (DVT) of RLE and partial DVT of LLE (6/19/2022), Acute on chronic renal failure ([facility]) (1/21/2020), Acute prostatitis (1/13/2022), Acute respiratory failure with hypoxia ([facility]) (5/20/2017), CAD (coronary artery disease), Cancer ([facility]), Cataract, Cerebrovascular accident (CVA) ([facility]) (12/30/2016), Chickenpox, CKD (chronic kidney disease) stage 3, GFR 30-59 ml/min, Controlled gout (2014), Coronary atherosclerosis of native coronary artery, Daytime sleepiness, Depression, Diabetes ([facility]), Diarrhea (7/2/2019), Difficulty swallowing, Dyschezia (9/22/2022), Dysphagia (3/15/2021), Enterococcal septicemia ([facility]) (8/12/2017), Exposure to SARS-associated coronavirus (10/13/2021), Roberto hematuria (1/29/2020), Frequent urination, GERD (gastroesophageal reflux disease), Upper sorbian measles, History of renal calculi (11/19/2019), Hordeolum externum of left lower eyelid (9/14/2021), Hospital discharge follow-up (6/27/2022), Hydronephrosis (1/20/2020), Hypertension (06/30/2021), Hypokalemia (2012), Hypomagnesemia (08/12/2017), Influenza A (1/26/2020), Insomnia, Kidney stone, Left hand weakness (5/20/2019), Leg edema, right (1/22/2020), Lymphoma ([facility]) (2/19/2017), Mixed hyperlipidemia, Muscle strain of right gluteal region (5/20/2019), Nephrolithiasis (2006), Normocytic hypochromic anemia (5/20/2017), NSTEMI (non-ST elevated myocardial infarction) ([facility]) (07/18/2017), Pain (06/30/2021), Peripheral vascular disease (HCC), Polyneuropathy in diabetes(357.2) (9/11/2013), Pyelonephritis (5/24/2022), Renal cyst  (1/29/2020), Renal mass (1/21/2020), Septic shock ([facility]) (5/20/2017), Skin ulcer of calf ([facility]) (2015), Stage 3b chronic kidney disease (8/25/2016), Stroke ([facility]) (2016), Toenail avulsion, initial encounter- left foot 3rd digit (7/12/2021), Urinary bladder disorder, Urinary incontinence, UTI (urinary tract infection) (5/28/2022), Weakness, and Wound of left leg (2012).    He has no past medical history of Suicide attempt ([facility]).  He  has a past surgical history that includes lithotripsy; angioplasty (1997); wound closure general (4/3/2012); tonsillectomy and adenoidectomy; cataract extraction with iol; solo by laparoscopy (1998); cystoscopy stent placement (Left, 2/12/2018); ureteroscopy (Left, 2/12/2018); lasertripsy (Left, 2/12/2018); [facility] cardiac cath (1997); [facility] cardiac cath (2009); tonsillectomy; lumbar transforaminal epidural steroid injection (Right, 11/2/2020); lumbar transforaminal epidural steroid injection (Right, 3/29/2021); pr upper gi endoscopy,diagnosis (N/A, 7/21/2021); pr upper gi endoscopy,biopsy (N/A, 7/21/2021); and pr inj lumbar/sacral,w/ imaging (7/27/2021).       Objective:   /70 (BP Location: Right arm, Patient Position: Sitting, BP Cuff Size: Adult)   Pulse 71   Temp 36.3 °C (97.3 °F) (Temporal)   Ht 1.829 m (6')   Wt 83.9 kg (185 lb)   SpO2 96%   BMI 25.09 kg/m²     Physical Exam  Vitals reviewed.   Constitutional:       Appearance: Normal appearance.   Pulmonary:      Effort: Pulmonary effort is normal. No respiratory distress.   Neurological:      Mental Status: He is alert and oriented to person, place, and time.   Psychiatric:         Mood and Affect: Mood normal.         Behavior: Behavior normal.         Thought Content: Thought content normal.         Judgment: Judgment normal.     Laboratory results reviewed: d/w pt   Latest Reference Range & Units 04/27/24 11:24   WBC 4.8 - 10.8 K/uL 10.5   RBC 4.70 - 6.10 M/uL 5.06   Hemoglobin 14.0 - 18.0 g/dL 14.3   Hematocrit 42.0 -  52.0 % 42.9   MCV 81.4 - 97.8 fL 84.8   MCH 27.0 - 33.0 pg 28.3   MCHC 32.3 - 36.5 g/dL 33.3   RDW 35.9 - 50.0 fL 45.1   Platelet Count 164 - 446 K/uL 255   MPV 9.0 - 12.9 fL 10.5   Sodium 135 - 145 mmol/L 136   Potassium 3.6 - 5.5 mmol/L 3.9   Chloride 96 - 112 mmol/L 100   Co2 20 - 33 mmol/L 25   Anion Gap 7.0 - 16.0  11.0   Glucose 65 - 99 mg/dL 142 (H)   Bun 8 - 22 mg/dL 22   Creatinine 0.50 - 1.40 mg/dL 0.99   GFR (CKD-EPI) >60 mL/min/1.73 m 2 78   Calcium 8.5 - 10.5 mg/dL 9.1   (H): Data is abnormally high   Latest Reference Range & Units 04/05/23 14:32   25-Hydroxy   Vitamin D 25 30 - 100 ng/mL 40   Pth, Intact 14.0 - 72.0 pg/mL 22.4         Assessment and Plan:   The following treatment plan was discussed:     1. CKD (chronic kidney disease), stage II  - Basic Metabolic Panel; Future    2. Essential hypertension  - Basic Metabolic Panel; Future    3. Anemia, unspecified type    4. Microalbuminuria due to type 2 diabetes mellitus (HCC)    5. Vitamin D deficiency    Recs:  Continue current treatment  Keep well hydrated  Monitor BP and call clinic of BP > 135/85  Low salt diet  F/u with Urology    Follow-up: Return in about 6 months (around 11/9/2024).

## 2024-05-13 ENCOUNTER — OFFICE VISIT (OUTPATIENT)
Dept: CARDIOLOGY | Facility: MEDICAL CENTER | Age: 79
End: 2024-05-13
Attending: PHYSICIAN ASSISTANT
Payer: MEDICARE

## 2024-05-13 VITALS
OXYGEN SATURATION: 96 % | HEIGHT: 72 IN | SYSTOLIC BLOOD PRESSURE: 127 MMHG | BODY MASS INDEX: 25.06 KG/M2 | WEIGHT: 185 LBS | DIASTOLIC BLOOD PRESSURE: 72 MMHG | RESPIRATION RATE: 16 BRPM | HEART RATE: 76 BPM

## 2024-05-13 DIAGNOSIS — E11.69 HYPERLIPIDEMIA ASSOCIATED WITH TYPE 2 DIABETES MELLITUS (HCC): ICD-10-CM

## 2024-05-13 DIAGNOSIS — Z95.5 PRESENCE OF DRUG COATED STENT IN LEFT CIRCUMFLEX CORONARY ARTERY: ICD-10-CM

## 2024-05-13 DIAGNOSIS — I48.0 PAROXYSMAL ATRIAL FIBRILLATION (HCC): ICD-10-CM

## 2024-05-13 DIAGNOSIS — E83.42 HYPOMAGNESEMIA: ICD-10-CM

## 2024-05-13 DIAGNOSIS — I10 ESSENTIAL HYPERTENSION, BENIGN: ICD-10-CM

## 2024-05-13 DIAGNOSIS — I25.10 CORONARY ARTERY DISEASE INVOLVING NATIVE CORONARY ARTERY OF NATIVE HEART WITHOUT ANGINA PECTORIS: ICD-10-CM

## 2024-05-13 DIAGNOSIS — E78.5 HYPERLIPIDEMIA ASSOCIATED WITH TYPE 2 DIABETES MELLITUS (HCC): ICD-10-CM

## 2024-05-13 PROCEDURE — 3074F SYST BP LT 130 MM HG: CPT | Performed by: PHYSICIAN ASSISTANT

## 2024-05-13 PROCEDURE — 1170F FXNL STATUS ASSESSED: CPT | Performed by: PHYSICIAN ASSISTANT

## 2024-05-13 PROCEDURE — 99214 OFFICE O/P EST MOD 30 MIN: CPT | Performed by: PHYSICIAN ASSISTANT

## 2024-05-13 PROCEDURE — 3078F DIAST BP <80 MM HG: CPT | Performed by: PHYSICIAN ASSISTANT

## 2024-05-13 ASSESSMENT — ENCOUNTER SYMPTOMS
ORTHOPNEA: 0
DIZZINESS: 0
PALPITATIONS: 0
COUGH: 0
PND: 0
SHORTNESS OF BREATH: 0

## 2024-05-13 ASSESSMENT — FIBROSIS 4 INDEX: FIB4 SCORE: 2.19

## 2024-05-13 NOTE — PROGRESS NOTES
Chief Complaint   Patient presents with    Atrial Fibrillation     Follow up         Subjective:   Av Bob is a 78 y.o. male who presents today for follow-up.    Patient of Dr. Tucker.  Current medical problems include CAD (stents to RCA, LAD, OM over 10 years ago), PAF, DM, CVA 2016 with residual L hemiparesis, HTN, CKD.  He has hyperlipidemia but is above goal on statins, he did not tolerate ezetimibe.    Last visit I adjusted his blood pressure medication regimen.  We reviewed his blood pressure log today which shows readings between 120-130 after changing to olmesartan and increasing the carvedilol.    Denies palpitations, chest pressure.  His only complaint is leg spasticity and cramping.       Past Medical History:   Diagnosis Date    ([facility]) Chronic ulcer of right lower extremity with fat layer exposed 12/27/2018    Acute cystitis without hematuria 6/30/2019    Acute deep vein thrombosis (DVT) of RLE and partial DVT of LLE 6/19/2022    US LE (6/2022):   1.  Acute appearing RIGHT posterior tibial vein DVT.  2.  Subacute appearing LEFT common femoral vein DVT and proximal femoral   vein junction DVT.   He had leg pain and ultrasound of bilateral lower extremities was completed which was positive for new DVT, acute in the right posterior tibial vein and subacute in the left common femoral and proximal femoral vein junction as noted    Acute on chronic renal failure (HCC) 1/21/2020    Acute prostatitis 1/13/2022    New problem of prostatitis identified by dispatch health when patient developed hematuria with rectal pain.  He followed up with his urology PA and was placed back on Bactrim.  He was recently on Bactrim and has a history of recurrent urinary tract infections related to neurogenic bladder from prior stroke.  Rectal pain is dissipated however he continues to have hematuria.  He had associated CATA o    Acute respiratory failure with hypoxia ([facility]) 5/20/2017    CAD (coronary artery disease)      GIOVANNA to RCA in '97, GIOVANNA X2 to LAD and GIOVANNA X2 to OM in '09    Cancer ([facility])     2017; chemo lympoma non hodgkins lymphoma    Cataract     dez iol    Cerebrovascular accident (CVA) ([facility]) 12/30/2016    Left arm weakness  etiology of stroke not established, lymphoma discovered on MRI evaluation of stroke, L hemiparesis much worse after acute infectious illness in mid 2017, but no specific diagnosed recurrent neurological etiology, all at [facility], [facility]    Chickenpox     CKD (chronic kidney disease) stage 3, GFR 30-59 ml/min     Controlled gout 2014    Coronary atherosclerosis of native coronary artery     S/P PTCA (percutaneous transluminal coronary angioplasty), RCA, 5/1997, patent on cath 7/10/2009 at the time of interventions on his left anterior descending and circumflex coronary arteries    Daytime sleepiness     Depression     Diabetes ([facility])     Diarrhea 7/2/2019    Difficulty swallowing     Dyschezia 9/22/2022    For the past 6 months or so he has been experiencing significant pain with defecation.  Does not happen every time.  His wife who is his primary caregiver notes there is no hard stool when he has a painful episodes.  He also is having stool incontinence sometimes when he coughs and other times he will pass a full bowel movement and not be aware.  He has not yet seen GI for this problem.    Dysphagia 3/15/2021    He reports progressive worsening of his swallow function.  He notices at least once per week he is choking and coughing, can be with pills or can be with food.  The episodes are quite frightening and he takes a bit of time for him to recover.  He has a prior history of stroke and recalls working with speech-language pathology at that time but does not remember if he did any formal esophagrams or s    Enterococcal septicemia ([facility]) 8/12/2017    Exposure to SARS-associated coronavirus 10/13/2021    He reports viral illness last week with runny nose, congestion,  productive cough, and wheezing.  He went to a play of his grandson and may have been exposed to Covid.  He was feeling very bad last Friday and over the weekend and now is at least 50% better.    Roberto hematuria 1/29/2020    Frequent urination     GERD (gastroesophageal reflux disease)     Setswana measles     History of renal calculi 11/19/2019    Hordeolum externum of left lower eyelid 9/14/2021    He reports to clinic with 3 weeks of erythema and pain of the left lower eyelid. No clear inciting cause. Reports no globe pain. Lower > upper eyelid swelling and erythema. After 1.5 weeks had drainage of purulence from the lower medial eyelid. No photosensitivity. Using warm compresses. No changes in visual acuity. Saw retinal specialist around day 1 or 2 of symptoms who felt it could be related     Hospital discharge follow-up 6/27/2022    Av was admitted to the hospital from 5/28 to 5/31/2022 for weakness and sepsis related to urinary tract infection.  [facility] to try to improve mobility and ADLs due to prior stroke.  This was complicated by C. difficile infection as well as lower extremity DVTs.  Diarrhea resolved with oral vancomycin.  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 Brary    Normocytic hypochromic anemia 5/20/2017    NSTEMI (non-ST elevated myocardial infarction) (HCC) 07/18/2017    complicating UTI with sepsis    Pain  06/30/2021    right leg foot    Peripheral vascular disease (HCC)     Polyneuropathy in diabetes(357.2) 9/11/2013    Pyelonephritis 5/24/2022    Av had abrupt onset of illness starting on Sunday.  He felt unwell and then had projectile vomiting x3 episodes after dinner that evening.  He had associated nausea but no overt abdominal pain.  He has continued to have anorexia and is having difficulty with his p.o. intake.  He did get in some fruit in a month and yesterday and about 50 to 60 ounces of water.  He has chronic urinary retention    Renal cyst 1/29/2020    Renal mass 1/21/2020    Septic shock ([facility]) 5/20/2017    Skin ulcer of calf ([facility]) 2015    Right, Dr. Terry and wound care    Stage 3b chronic kidney disease 8/25/2016     Latest Reference Range & Units 04/29/22 11:14 Bun 8 - 22 mg/dL 33 (H) Creatinine 0.50 - 1.40 mg/dL 1.55 (H) GFR (CKD-EPI) >60 mL/min/1.73 m 2 46 ! [1]  He has a history of chronic kidney disease related to nephrolithiasis, recurrent UTIs, and BPH as well as history of hypertension and diabetes.  He is following with nephrology, Dr. Jarvis and urology, Dr. Barry.  Spironolactone was discontinued due     Stroke ([facility]) 2016    left sided weakness    Toenail avulsion, initial encounter- left foot 3rd digit 7/12/2021    They report that his toenail spontaneously came off last week.  He does not recall specific injury or any pain.  His significant other saw the nail on the floor with dried blood on his foot.  She cleaned the wound and covered it with gauze.  On follow-up in clinic today the gauze has stuck to the wound bed but with saline was fairly easy to remove the dried gauze.  There was scant amount of bright    Urinary bladder disorder     Urinary incontinence     pt wears pads    UTI (urinary tract infection) 5/28/2022    Weakness     Wound of left leg 2012    Requiring surgery and debridment, Dr. Moore         Family History   Problem Relation Age of Onset    Heart Disease Father          CAD    Diabetes Father     Cancer Mother     Psychiatric Illness Mother         Depression    Depression Mother     Kidney stones Brother     Heart Disease Brother     Psychiatric Illness Brother         Depression    Depression Brother     Suicide Attempts Other     Psychiatric Illness Other         autism         Social History     Tobacco Use    Smoking status: Never    Smokeless tobacco: Never   Vaping Use    Vaping Use: Never used   Substance Use Topics    Alcohol use: Never    Drug use: Never         Allergies   Allergen Reactions    Diphenhydramine Anxiety     Pt is able to tolerate  Mg benadryl with less anxiety    Lorazepam Unspecified     Disorientation    Spironolactone Unspecified     Acute kidney injury    Ciprofloxacin Rash     Rash,stomach ache         Current Outpatient Medications   Medication Sig    baclofen (LIORESAL) 10 MG Tab Take 0.5-1 Tablets by mouth at bedtime as needed (spasms) for up to 28 days.    HUMALOG KWIKPEN 100 UNIT/ML Solution Pen-injector injection PEN INJECT 5 UNITS UNDER SKIN 3 TIMES A DAY BEFORE MEALS. 5 UNITS FOR SUGARS= 101-150. INCREASE BY 2 UNITS IF>150. CORRECTION DOSAGE IS 2:50>150    fluoxetine (PROZAC) 40 MG capsule TAKE 1 CAPSULE BY MOUTH EVERY DAY    carvedilol (COREG) 6.25 MG Tab Take 1 Tablet by mouth 2 times a day with meals.    amLODIPine (NORVASC) 10 MG Tab Take 1 Tablet by mouth every day.    olmesartan (BENICAR) 40 MG Tab Take 1 Tablet by mouth every evening.    allopurinol (ZYLOPRIM) 100 MG Tab Take 1 Tablet by mouth every evening.    Insulin Glargine, 1 Unit Dial, (TOUJEO SOLOSTAR) 300 UNIT/ML Solution Pen-injector Inject 18-25 Units under the skin every day.    Probiotic Product (PROBIOTIC DAILY) Cap Take  by mouth. Floaster    Potassium Chloride CR (MICRO-K) 8 MEQ Cap CR capsule TAKE 1 CAPSULE BY MOUTH EVERY 48 HOURS    clopidogrel (PLAVIX) 75 MG Tab Take 1 Tablet by mouth every day.    atorvastatin (LIPITOR) 80 MG tablet Take 1 Tablet by mouth every  evening.    BOTOX 200 units Recon Soln injection     finasteride (PROSCAR) 5 MG Tab Take 5 mg by mouth every evening.    Multiple Vitamin (MULTIVITAMIN PO) Take 1 Tablet by mouth every morning.    Cholecalciferol (VITAMIN D) 2000 UNIT Tab Take 2,000 Units by mouth every morning.    magnesium oxide (MAG-OX) 400 MG Tab tablet Take 800 mg by mouth 2 times a day.    omeprazole (PRILOSEC) 20 MG delayed-release capsule Take 1 Capsule by mouth every day.    acetaminophen (TYLENOL) 325 MG Tab Take 2 Tablets by mouth every 6 hours as needed for Mild Pain, Moderate Pain or Fever.    ONETOUCH ULTRA strip USE 1 STRIP BY OTHER ROUTE 3 TIMES A DAY    Insulin Pen Needle (PEN NEEDLES) 32G X 4 MM Misc 1 Each 5 Times a Day. USE FOR INSULIN SHOTS FIVE TIMES DAILY         Review of Systems   Respiratory:  Negative for cough and shortness of breath.    Cardiovascular:  Negative for chest pain, palpitations, orthopnea, leg swelling and PND.   Neurological:  Negative for dizziness.          Objective:   /68 (BP Location: Right arm, Patient Position: Sitting)   Pulse 76   Resp 16   Ht 1.829 m (6')   Wt 83.9 kg (185 lb)   SpO2 96%  Body mass index is 25.09 kg/m².         Physical Exam  Vitals reviewed.   Constitutional:       Comments: Wheelchair-bound   HENT:      Head: Normocephalic and atraumatic.   Cardiovascular:      Rate and Rhythm: Normal rate and regular rhythm.      Heart sounds: No murmur heard.  Pulmonary:      Effort: Pulmonary effort is normal. No respiratory distress.      Breath sounds: No rales.   Musculoskeletal:      Right lower leg: No edema.      Left lower leg: No edema.   Skin:     General: Skin is warm.   Neurological:      General: No focal deficit present.      Mental Status: He is alert.      Comments: Left hemiparesis   Psychiatric:         Judgment: Judgment normal.            Assessment:     No diagnosis found.       Medical Decision Making:  Today's Assessment / Plan:   Dyslipidemia  CAD s/p remote  stenting to  RCA, LAD, OM  PAD with chronic wounds   -last in 2009.   - no angina symptoms, is mostly in wheel chair, although does work with PT.   -Continue clopidogrel 75  - LDL is above goal of 70 with high dose atorva.  He did not tolerate the addition of ezetimibe.  At this point he is not interested in any further cholesterol medication such as bempedoic acid or PCSK9 inhibitors as he is concerned about polypharmacy in his poor tolerance to many medications.       Essential hypertension, benign  -Continue olmesartan and carvedilol     Paroxysmal atrial fibrillation in setting of sepsis/hospitalization   - ?single documented episode, I went back in his chart to 2017 and did not find if this was in conjunction with his CVA/TIA  - see Dr. Tucker's note for previous discussion of anticoagulation/GI bleeds.  We again discussed anticoagulation for stroke prevention and again Av is not interested in this or any procedures     CKDstage 3  -followed by Dr. Jarvis.       Cerebrovascular accident (CVA) due to embolism of cerebral artery   - single antiplatelet agent, statin therapy, blood pressure control     Hypomagnesemia  -on high dose mag oxide       Return in about 1 year (around 5/13/2025) for establish with Dr. Myles or other cardiologist at [facility].

## 2024-05-17 ENCOUNTER — PATIENT MESSAGE (OUTPATIENT)
Dept: PHYSICAL MEDICINE AND REHAB | Facility: MEDICAL CENTER | Age: 79
End: 2024-05-17
Payer: MEDICARE

## 2024-05-20 DIAGNOSIS — E11.69 HYPERLIPIDEMIA ASSOCIATED WITH TYPE 2 DIABETES MELLITUS (HCC): ICD-10-CM

## 2024-05-20 DIAGNOSIS — E78.5 HYPERLIPIDEMIA ASSOCIATED WITH TYPE 2 DIABETES MELLITUS (HCC): ICD-10-CM

## 2024-05-20 RX ORDER — ATORVASTATIN CALCIUM 80 MG/1
80 TABLET, FILM COATED ORAL EVERY EVENING
Qty: 100 TABLET | Refills: 3 | Status: SHIPPED | OUTPATIENT
Start: 2024-05-20

## 2024-05-20 NOTE — TELEPHONE ENCOUNTER
Is the patient due for a refill? Yes    Was the patient seen the past year? Yes    Date of last office visit: 05/13/2024    Does the patient have an upcoming appointment?  No   If yes, When?     Provider to refill:ROSA    Does the patients insurance require a 100 day supply?  No

## 2024-06-06 ENCOUNTER — APPOINTMENT (OUTPATIENT)
Dept: PHYSICAL MEDICINE AND REHAB | Facility: MEDICAL CENTER | Age: 79
End: 2024-06-06
Payer: MEDICARE

## 2024-06-06 VITALS
OXYGEN SATURATION: 97 % | HEART RATE: 75 BPM | TEMPERATURE: 97.3 F | SYSTOLIC BLOOD PRESSURE: 116 MMHG | DIASTOLIC BLOOD PRESSURE: 74 MMHG

## 2024-06-06 DIAGNOSIS — R25.2 SPASTICITY: ICD-10-CM

## 2024-06-06 DIAGNOSIS — I69.954 HEMIPLEGIA AND HEMIPARESIS FOLLOWING UNSPECIFIED CEREBROVASCULAR DISEASE AFFECTING LEFT NON-DOMINANT SIDE (HCC): ICD-10-CM

## 2024-06-06 PROCEDURE — G2211 COMPLEX E/M VISIT ADD ON: HCPCS | Performed by: GENERAL PRACTICE

## 2024-06-06 PROCEDURE — 99213 OFFICE O/P EST LOW 20 MIN: CPT | Mod: 95 | Performed by: GENERAL PRACTICE

## 2024-06-06 RX ORDER — BACLOFEN 5 MG/1
5 TABLET ORAL NIGHTLY
Qty: 60 TABLET | Refills: 2 | Status: SHIPPED | OUTPATIENT
Start: 2024-06-06 | End: 2024-12-03

## 2024-06-06 NOTE — PROGRESS NOTES
[facility]  [facility]   [address], [address] [place] [address]  Ph: [phone]    SPASTICITY CLINIC    Patient Name: Av Bob   Patient : 1945  PCP: Madison Bunch D.O.    Referring Physician: No ref. provider found   Reason for Referral: Spasticity Management   Examining Physician: Brett Cantrell DO  Date of Service: 12/10/2023      Telemedicine Video Visit: Established Patient   This Remote Face to Face encounter was conducted via Zoom. The patient was in their home in the [facility].  Given the patient's difficulty with transportation, I am providing medical care to this patient via audio/video visit in place of an in person visit at the request of the patient. Verbal consent to telehealth, risks, benefits, and consequences were discussed. Patient retains the right to withdraw at any time. All existing confidentiality protections apply. The patient has access to all transmitted medical information. No dissemination of any patient images or information to other entities without further written consent.      SUBJECTIVE:   Patient Identification: Av Bob is a 79 y.o. 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 Huff from 22 until 23.     Accompanied by Today: Wife, Usha, assists with history.     Procedures:  2020 right L3-4 and L4-5 transforaminal epidural steroid injection 95% pain relief and follow-up visit Dr Bennett  3/29/2021 right L3-4 and L4-5 transforaminal epidural steroid injection 90% improved  postprocedure.  Dr Bennett    History of Present Illness:      6/6/2024 since last visit with restarting baclofen, would like to increase it from 5mg to 10mg.  4-5 nights that he received an increased 5mg, which 50% of the time would make him drowsy.  Still having pain during the day.  Always fatigued. Overally improvement with baclofen. Much improvement of pain.  No recent infection, skin breakdown, urinary retention, constipation, or new stressors.  Would like to proceed with Botox injections.   Daytime cramping and controlled with APAP.     5/8/2024 since last visit/injection 3/25/24, patient, wife, and reports from physical therapy have not noticed much improvement with the left lower extremity and elbow range of motion.  They have noticed improvement with the hands.  The patient seems to have more pain at night.  He stated that the baclofen in the past caused the patient to be confused but are willing to attempt another trial as during the hospitalization the patient also had a C. difficile infection.  No recent infection, skin breakdown, urinary retention, constipation, or new stressors.  Pain and hygiene are barriers with spasticity. The patient denies any fevers, chills, chest pain or shortness of breath.      3/25/24 right knee contracture. Nonambulatory. Painful. Typically controlled gabapentin. Wheelchair bound.     12/14/2023 since last visit/injection, patient has reported same improvement.  Same difficulty with ADLs.  AFO L when exercises and ambulates. No wrist splint and discussed with PT/OT and Dr Huff. No skin breakdown. Infrequent constipation but lately easy bowel movements.  No recent infection or new stressors.  The patient denies any fevers, chills, chest pain or shortness of breath. Never slept well at night and sleep during the day and uses TV.   Prior evaluation with Dr Bennett with Gabapentin and effective for neuropathic pain.  Received prior epidural steroid injections but no active  back pain at this time.      Current Spasticity Medications and Dosages:  Baclofen 5mg at night but increases tiredness    Past Spasticity Medications and Dosages:  Baclofen d/t cognitive effects    Previous Spasticity Injection History:  3/25/24, 8/24/23  Botox: left upper extremity + lower extremity  Location Botox Amount in Units   Pec sonya and minor 150 units total (3 locations)   Biceps    200 units (4 locations)   Hamstring - semimembranosus 150 units (3 locations)   Hamstring - semitendinosus  150 units (3 locations)   Hamstring - Bicep femoris long head and short head 150 units (3 locations)   Total Units  800 units         200 units of SAMPLES used to get better movement from PEC and HAMSTRING    5/25/23, 2/13/23  Botox: left upper extremity + lower extremity  Location Botox Amount in Units   Pec sonya and minor 50 units each muscle (100 total)   Biceps    200 units (4 locations)   Hamstring - semimembranosus 100 units (2 locations)   Hamstring - semitendinosus  100 units (2 locations)   Hamstring - Bicep femoris long head and short head 100 units (2 locations)   Total Units  600 units      10/24/22, 6/29/22  Botox: left upper extremity  Location Botox Amount in Units   Biceps    400 units   Total Units  400 units       8/12/21:   Botox: left upper extremity  Location Botox Amount in Units   Biceps    50 units   FCR  75 units   FCU  75 units   Pronator teres   50 units   FDS   75 units   FDP   75 units   Total Units  400 units      5/11/21  Botox: left upper extremity and left lower extremity  Location Botox Amount in Units   Left semimembranosus  75 units   Left semitendinosus  75 units   Left biceps femoris  75 units   Left bicep  75 units   Left FCR  50 units   Left FCU  50 units   Total Units  400 units        Review of Systems:  Red Flags ROS:   Fever, Chills, Sweats: Denies  Involuntary Weight Loss: Denies  Bladder Incontinence: Denies  Bowel Incontinence: denies  Saddle Anesthesia: Denies  Falls:  denies  progressive motor weakness: denies  All other systems reviewed and negative.     Past Medical History:  Past Medical History:   Diagnosis Date    Dyschezia 9/22/2022    For the past 6 months or so he has been experiencing significant pain with defecation.  Does not happen every time.  His wife who is his primary caregiver notes there is no hard stool when he has a painful episodes.  He also is having stool incontinence sometimes when he coughs and other times he will pass a full bowel movement and not be aware.  He has not yet seen GI for this problem.    Hospital discharge follow-up 6/27/2022    Av was admitted to the hospital from 5/28 to 5/31/2022 for weakness and sepsis related to urinary tract infection.  [facility] to try to improve mobility and ADLs due to prior stroke.  This was complicated by C. difficile infection as well as lower extremity DVTs.  Diarrhea resolved with oral vancomycin.  Xarelto was changed from prophylaxis to treatment     Acute deep vein thrombosis (DVT) of RLE and partial DVT of LLE 6/19/2022    US LE (6/2022):   1.  Acute appearing RIGHT posterior tibial vein DVT.  2.  Subacute appearing LEFT common femoral vein DVT and proximal femoral   vein junction DVT.   He had leg pain and ultrasound of bilateral lower extremities was completed which was positive for new DVT, acute in the right posterior tibial vein and subacute in the left common femoral and proximal femoral vein junction as noted    UTI (urinary tract infection) 5/28/2022    Pyelonephritis 5/24/2022    Av had abrupt onset of illness starting on Sunday.  He felt unwell and then had projectile vomiting x3 episodes after dinner that evening.  He had associated nausea but no overt abdominal pain.  He has continued to have anorexia and is having difficulty with his p.o. intake.  He did get in some fruit in a month and yesterday and about 50 to 60 ounces of water.  He has chronic  urinary retention    Acute prostatitis 1/13/2022    New problem of prostatitis identified by dispatch health when patient developed hematuria with rectal pain.  He followed up with his urology PA and was placed back on Bactrim.  He was recently on Bactrim and has a history of recurrent urinary tract infections related to neurogenic bladder from prior stroke.  Rectal pain is dissipated however he continues to have hematuria.  He had associated CATA o    Exposure to SARS-associated coronavirus 10/13/2021    He reports viral illness last week with runny nose, congestion, productive cough, and wheezing.  He went to a play of his grandson and may have been exposed to Covid.  He was feeling very bad last Friday and over the weekend and now is at least 50% better.    Hordeolum externum of left lower eyelid 9/14/2021    He reports to clinic with 3 weeks of erythema and pain of the left lower eyelid. No clear inciting cause. Reports no globe pain. Lower > upper eyelid swelling and erythema. After 1.5 weeks had drainage of purulence from the lower medial eyelid. No photosensitivity. Using warm compresses. No changes in visual acuity. Saw retinal specialist around day 1 or 2 of symptoms who felt it could be related     Toenail avulsion, initial encounter- left foot 3rd digit 7/12/2021    They report that his toenail spontaneously came off last week.  He does not recall specific injury or any pain.  His significant other saw the nail on the floor with dried blood on his foot.  She cleaned the wound and covered it with gauze.  On follow-up in clinic today the gauze has stuck to the wound bed but with saline was fairly easy to remove the dried gauze.  There was scant amount of bright    Pain 06/30/2021    right leg foot    Hypertension 06/30/2021    pt states well controlled on meds    Dysphagia 3/15/2021    He reports progressive worsening of his swallow function.  He notices at least once per week he is choking and coughing, can  be with pills or can be with food.  The episodes are quite frightening and he takes a bit of time for him to recover.  He has a prior history of stroke and recalls working with speech-language pathology at that time but does not remember if he did any formal esophagrams or s    Roberto hematuria 1/29/2020    Renal cyst 1/29/2020    Influenza A 1/26/2020    Leg edema, right 1/22/2020    Acute on chronic renal failure (HCC) 1/21/2020    Renal mass 1/21/2020    Hydronephrosis 1/20/2020    History of renal calculi 11/19/2019    Diarrhea 7/2/2019    Acute cystitis without hematuria 6/30/2019    Muscle strain of right gluteal region 5/20/2019    Left hand weakness 5/20/2019    ([facility]) Chronic ulcer of right lower extremity with fat layer exposed 12/27/2018    Enterococcal septicemia ([facility]) 8/12/2017    Hypomagnesemia 08/12/2017    etiology uncertain    NSTEMI (non-ST elevated myocardial infarction) ([facility]) 07/18/2017    complicating UTI with sepsis    Acute respiratory failure with hypoxia ([facility]) 5/20/2017    Septic shock ([facility]) 5/20/2017    Normocytic hypochromic anemia 5/20/2017    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 [1]  He has a history of chronic kidney disease related to nephrolithiasis, recurrent UTIs, and BPH as well as history of hypertension and diabetes.  He is following with nephrology, Dr. Jarvis and urology, Dr. Barry.  Spironolactone was discontinued due     Stroke ([facility]) 2016    left sided  weakness    Skin ulcer of calf ([facility]) 2015    Right, Dr. Terry and wound care    Controlled gout 2014    Polyneuropathy in diabetes(357.2) 9/11/2013    Hypokalemia 2012    controlled with combination of ACE inhibitor or ARB plus spironolactone    Wound of left leg 2012    Requiring surgery and debridment, Dr. Moore    Nephrolithiasis 2006    right kidney subsequent lithotripsy by Dr. Barry    CAD (coronary artery disease)     GIOVANNA to RCA in '97, GIOVANNA X2 to LAD and GIOVANNA X2 to OM in '09    Cancer ([facility])     2017; chemo lympoma non hodgkins lymphoma    Cataract     dez iol    Chickenpox     CKD (chronic kidney disease) stage 3, GFR 30-59 ml/min     Coronary atherosclerosis of native coronary artery     S/P PTCA (percutaneous transluminal coronary angioplasty), RCA, 5/1997, patent on cath 7/10/2009 at the time of interventions on his left anterior descending and circumflex coronary arteries    Daytime sleepiness     Depression     Diabetes (HCC)     Difficulty swallowing     Frequent urination     GERD (gastroesophageal reflux disease)     Tunisian measles     Insomnia     Kidney stone     Mixed hyperlipidemia     Peripheral vascular disease ([facility])     Urinary bladder disorder     Urinary incontinence     pt wears pads    Weakness       Allergies   Allergen Reactions    Diphenhydramine Anxiety     Pt is able to tolerate  Mg benadryl with less anxiety    Lorazepam Unspecified     Disorientation    Spironolactone Unspecified     Acute kidney injury    Ciprofloxacin Rash     Rash,stomach ache        Past Social History:  Social History     Socioeconomic History    Marital status:      Spouse name: Not on file    Number of children: Not on file    Years of education: Not on file    Highest education level: Not on file   Occupational History    Not on file   Tobacco Use    Smoking status: Never    Smokeless tobacco: Never   Vaping Use    Vaping status: Never Used   Substance and Sexual Activity    Alcohol use: Never     Drug use: Never    Sexual activity: Not Currently     Partners: Female     Comment: , one daughter, 2 grands   Other Topics Concern     Service Yes    Blood Transfusions No    Caffeine Concern No    Occupational Exposure No    Hobby Hazards No    Sleep Concern Yes    Stress Concern No    Weight Concern No    Special Diet No    Back Care No    Exercise Yes    Bike Helmet No    Seat Belt Yes    Self-Exams Yes   Social History Narrative    Av is from [place], [place] and raised in [place]. He has been in [place] since 2004. 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  He has been  to Usha since 2003, they met through a mutual friend. He has a daughter (Kalina) and a step son (Jimi).     Social Determinants of Health     Financial Resource Strain: Low Risk  (4/1/2024)    Overall Financial Resource Strain (CARDIA)     Difficulty of Paying Living Expenses: Not very hard   Food Insecurity: No Food Insecurity (4/1/2024)    Hunger Vital Sign     Worried About Running Out of Food in the Last Year: Never true     Ran Out of Food in the Last Year: Never true   Transportation Needs: No Transportation Needs (4/1/2024)    PRAPARE - Transportation     Lack of Transportation (Medical): No     Lack of Transportation (Non-Medical): No   Physical Activity: Inactive (4/1/2024)    Exercise Vital Sign     Days of Exercise per Week: 0 days     Minutes of Exercise per Session: 0 min   Stress: No Stress Concern Present (4/1/2024)    [employer] [place] [employer] - Occupational Stress Questionnaire     Feeling of Stress : Only a little   Social Connections: Moderately Isolated (4/1/2024)    Social Connection and Isolation Panel [NHANES]     Frequency of Communication with Friends  and Family: Twice a week     Frequency of Social Gatherings with Friends and Family: Twice a week     Attends Protestant Services: Never     Active Member of Clubs or Organizations: No     Attends Club or Organization Meetings: Never     Marital Status:    Intimate Partner Violence: Not on file   Housing Stability: Low Risk  (4/1/2024)    Housing Stability Vital Sign     Unable to Pay for Housing in the Last Year: No     Number of Places Lived in the Last Year: 1     Unstable Housing in the Last Year: No        Family History:  Family History   Problem Relation Age of Onset    Heart Disease Father         CAD    Diabetes Father     Cancer Mother     Psychiatric Illness Mother         Depression    Depression Mother     Kidney stones Brother     Heart Disease Brother     Psychiatric Illness Brother         Depression    Depression Brother     Suicide Attempts Other     Psychiatric Illness Other         autism         OBJECTIVE:   Vital Signs:  Virtual Visit    Physical Exam:   Body Habitus: There is no height or weight on file to calculate BMI. Virtual Visit  Appearance: Well-groomed, well-nourished, not disheveled  Eyes: No scleral icterus to suggest severe liver disease, no proptosis to suggest severe hyperthyroid  ENT -no obvious auditory deficits, no external lesions, moist mucus membranes   Skin - bandaids on his knee for safety.  no rashes or lesions noted. No appreciable skin breakdown on exposed skin areas.    Respiratory-  breathing comfortably on room air, no audible wheezing, full sentences  Cardiovascular- No lower extremity edema noted.   Psychiatric- alert and oriented,  calm, comfortable, cooperative   Gait - video;   Neuromuscular-  Awake alert.  Conversational.       Prior exam May 2024  Tone on Modified Deuce Scale    L R  R L   Elbow extension (testing tone of elbow flexors) 3  Hip extension (testing hip flexors)     Elbow flexion (testing tone of elbow extensors) 0  Hip abduction (testing  adductors)     Wrist extension (testing tone of wrist flexors)  <3  Knee extension (testing knee flexors) 3 3   Finger extension (testing tone of finger flexors) 0  Knee flexion (testing knee extensors)  1   Supination (testing forearm pronators) 3  Dorsiflexion (testing plantarflexors)  0      Plantarflexion (testing dorsiflexors)  1       Prior exam 12/14/24  Tone on Modified Deuce Scale    L R  R L   Elbow extension (testing tone of elbow flexors) 3   Hip extension (testing hip flexors)     Elbow flexion (testing tone of elbow extensors) 0  Hip abduction (testing adductors)     Wrist extension (testing tone of wrist flexors)  3  Knee extension (testing knee flexors)  3   Finger extension (testing tone of finger flexors) 1  Knee flexion (testing knee extensors)  1   Supination (testing forearm pronators) 3  Dorsiflexion (testing plantarflexors)  0      Plantarflexion (testing dorsiflexors)  1         Pertinent Labs:  Lab Results   Component Value Date/Time    SODIUM 136 04/27/2024 11:24 AM    POTASSIUM 3.9 04/27/2024 11:24 AM    CHLORIDE 100 04/27/2024 11:24 AM    CO2 25 04/27/2024 11:24 AM    GLUCOSE 142 (H) 04/27/2024 11:24 AM    BUN 22 04/27/2024 11:24 AM    CREATININE 0.99 04/27/2024 11:24 AM    CREATININE 1.4 05/28/2008 05:42 PM    BUNCREATRAT 22.0 03/01/2022 02:22 PM    BUNCREATRAT 10 03/27/2017 02:11 PM       Lab Results   Component Value Date/Time    WBC 10.5 04/27/2024 11:24 AM    RBC 5.06 04/27/2024 11:24 AM    HEMOGLOBIN 14.3 04/27/2024 11:24 AM    HEMATOCRIT 42.9 04/27/2024 11:24 AM    MCV 84.8 04/27/2024 11:24 AM    MCH 28.3 04/27/2024 11:24 AM    MCHC 33.3 04/27/2024 11:24 AM    MPV 10.5 04/27/2024 11:24 AM    NEUTSPOLYS 81.10 (H) 08/25/2022 01:47 PM    LYMPHOCYTES 9.50 (L) 08/25/2022 01:47 PM    MONOCYTES 6.10 08/25/2022 01:47 PM    EOSINOPHILS 2.10 08/25/2022 01:47 PM    BASOPHILS 0.30 08/25/2022 01:47 PM    HYPOCHROMIA 1+ 03/21/2012 01:08 PM       Lab Results   Component Value Date/Time     ASTSGOT 37 01/04/2023 02:39 PM    ALTSGPT 26 01/04/2023 02:39 PM       Imaging:   I personally reviewed following images, these are my reads  CT head without contrast 11/20/2021: no hemorrhage or evidence for acute infarct.   R knee XR 4/12/24: mod Tricompartment osteoarthritis. Chondrocalcinosis. Popliteal calcification    IMAGING radiology reads. I reviewed the following radiology reads     4/12/2024 11:32 AM     HISTORY/REASON FOR EXAM:  Right knee pain.     TECHNIQUE/EXAM DESCRIPTION AND NUMBER OF VIEWS: 3 views of the RIGHT knee.     COMPARISON: None     FINDINGS:  Bone density is osteopenic There is no evidence of fracture or dislocation. There is tricompartment degenerative change characterized by osteophytic spurring and joint space loss. There is chondrocalcinosis. There is vascular calcification. There is no   joint effusion.     IMPRESSION:     1.  Tricompartment osteoarthritis.     2.  Chondrocalcinosis.                                             ASSESSMENT/PLAN: Av oBb  is a 79y.o. male with rehabilitation history significant for CKD, PVD, Hodgkins lymphoma (+ chemo), pAF (melena with Xarelto), SVT, low T, BPH and rehabilitation history significant for R CVA 12/31/16 with residual left hemiparesis (tx in [place]), general debility  presenting for spasticity management. The following plan was discussed with the patient who is in agreement.     Visit Diagnoses     ICD-10-CM   1. Spasticity  R25.2   2. Hemiplegia and hemiparesis following unspecified cerebrovascular disease affecting left non-dominant side (HCC)  I69.954     Wife, Usha, assists with history.     Rehab/Neuro:   1. 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  He is able to put his  foot flat on the ground to stand.  It is also easier for him to do ADLs such as dressing due to the increase in flexibility in his shoulder abduction.  -Brace/orthotic/assistive devices: AFO L when exercises and ambulates. No wrist splint as discussed with PT/OT.   6/6/2024 No new Ads required today    Spasticity secondary to stroke  6/6/2024 improvement of spasticity as sleeping better and less spastic events.  Will continue with baclofen 5 mg and prescribe 5 mg tablets and split to 2.5 mg tablets as needed informed patient that this will cause drowsiness and to use sparingly and to expect decrease activity the following day.  Medication Management: Continue 5 mg baclofen and 2.5 mg PRN  We will proceed with ordering Botox for management of spasticity. The following muscles will be targeted with the corresponding Botox Units.   Botox significantly helped with left elbow pain.  - Referral: Physiatry for repeat Botox injections.  Patient will benefit from Botox injections in conjunction of use with baclofen to Improved ADLs and mobility.  Would benefit from continued Botox injections to capitalize on spasticity control that we have gained thus far.    Okay to continue antiplatelet with plavix through the procedure for botulinum toxin injection.  The risks of going off the medication outweigh the risks of doing the procedure on the medication.  I will plan to use 27-gauge needles and ultrasound guidance to avoid all blood vessels during the procedure.  We discussed that while on anticoagulation the patient does have an increased risk of bleeding and hematoma     Botox: left upper extremity + lower extremity  Location Botox Amount in Units   Pec sonya and minor 100 units total (3 locations)   Biceps    200 units (4 locations)   Hamstring - semimembranosus 100 units (3 locations)   Hamstring - semitendinosus  100 units (3 locations)   Hamstring - Bicep femoris long head and short head 100 units (3 locations)   Total Units   600 units        The risks benefits and alternatives to this procedure were discussed and the patient wishes to proceed with the procedure. Risks include but are not limited to damage to surrounding structures, infection, bleeding, worsening of pain which can be permanent, weakness which can be permanent. Benefits include pain relief, improved function. Alternatives includes not doing the procedure.       Guidance: EMG/US - For appropriate identification and targeting of spastic muscles to be injected.     Other:   Dietary and exercise counseling provided  Insomnia  right knee contracture. X-ray revealed no acute processes such as fractures but did reveal osteoarthritis; suspect pain is likely due to spasticity and will continue  baclofen and monitor  Lumbar radiculopathy and neuropathic pain: Primary transforaminal epidural steroid injections with Dr. Bennett; will continue to monitor    Follow-up: botox injections  Patient expressed understanding of the management plan. Patient (and Family Members) was/were encouraged to call if any worries, issues, problems or concerns prior to the next visit     Please note that this dictation was created using voice recognition software. I have made every reasonable attempt to correct obvious errors but there may be errors of grammar and content that I may have overlooked prior to finalization of this note.    My total time spent caring for the patient on the day of the encounter was 25 minutes.   This does not include time spent on separately billable procedures/tests.        Brett Cantrell DO  Department of Physical Medicine and Rehabilitation  [facility]         CALVIN Bunch D.O.   CC No ref. provider found

## 2024-06-26 ENCOUNTER — PATIENT OUTREACH (OUTPATIENT)
Dept: HEALTH INFORMATION MANAGEMENT | Facility: OTHER | Age: 79
End: 2024-06-26
Payer: MEDICARE

## 2024-06-26 DIAGNOSIS — Z79.4 TYPE 2 DIABETES MELLITUS WITH STAGE 3B CHRONIC KIDNEY DISEASE, WITH LONG-TERM CURRENT USE OF INSULIN (HCC): ICD-10-CM

## 2024-06-26 DIAGNOSIS — N18.32 TYPE 2 DIABETES MELLITUS WITH STAGE 3B CHRONIC KIDNEY DISEASE, WITH LONG-TERM CURRENT USE OF INSULIN (HCC): ICD-10-CM

## 2024-06-26 DIAGNOSIS — E11.22 TYPE 2 DIABETES MELLITUS WITH STAGE 3B CHRONIC KIDNEY DISEASE, WITH LONG-TERM CURRENT USE OF INSULIN (HCC): ICD-10-CM

## 2024-06-26 NOTE — PROGRESS NOTES
6/26/24 2:35 pm: Nurse CM outreach all to pt for monthly CCM assessment. VM left with CM telephone number requesting a call back to nurse at [phone].    6/27/24 11:45 am: Pt' spouse returned nurse CM call.    Assessment    Spouse reports pt doing well.  States pt had follow-up with Dr. Cantrell for spasticity problem. Spouse reports pt has been prescribed baclofen 5 mg for spasticity. Reports pt has been taking at night. Spouse reports pt reports improvement in spasticity symptoms since starting medications. Pt has history of DM. Spouse reports pt's blood sugar this am was 99. Reports no problems with low sugars. Spouse reports they are needing a refill of humalog. Reports spouse taking 5-7 units three times a day. Last A1C was 7.3 on 2/27/24. Spouse reports pt's previous problems with area of scratching skin on arm is now improved. States Lubriderm lotion has been helping. Nurse reviewed upcoming appts.     Education    Continue to follow with specialists. Continue to monitor blood sugar readings.     Plan of Care and Goals    Reviewed    Barriers:    Chronic health conditions    Progress:    Continue to work on progress    Next outreach:  One month  Nurse Care Coordinator:  Camilla Navarro  [phone]

## 2024-07-03 ENCOUNTER — APPOINTMENT (OUTPATIENT)
Dept: PHYSICAL MEDICINE AND REHAB | Facility: MEDICAL CENTER | Age: 79
End: 2024-07-03
Payer: MEDICARE

## 2024-07-03 VITALS
HEART RATE: 68 BPM | DIASTOLIC BLOOD PRESSURE: 60 MMHG | OXYGEN SATURATION: 98 % | BODY MASS INDEX: 25.06 KG/M2 | HEIGHT: 72 IN | WEIGHT: 185 LBS | TEMPERATURE: 97.6 F | SYSTOLIC BLOOD PRESSURE: 108 MMHG

## 2024-07-03 DIAGNOSIS — I69.954 HEMIPLEGIA AND HEMIPARESIS FOLLOWING UNSPECIFIED CEREBROVASCULAR DISEASE AFFECTING LEFT NON-DOMINANT SIDE (HCC): ICD-10-CM

## 2024-07-03 DIAGNOSIS — R25.2 SPASTICITY: ICD-10-CM

## 2024-07-03 PROCEDURE — 1170F FXNL STATUS ASSESSED: CPT | Performed by: GENERAL PRACTICE

## 2024-07-03 PROCEDURE — 64643 CHEMODENERV 1 EXTREM 1-4 EA: CPT | Performed by: GENERAL PRACTICE

## 2024-07-03 PROCEDURE — 64642 CHEMODENERV 1 EXTREMITY 1-4: CPT | Performed by: GENERAL PRACTICE

## 2024-07-03 PROCEDURE — 76942 ECHO GUIDE FOR BIOPSY: CPT | Performed by: GENERAL PRACTICE

## 2024-07-03 ASSESSMENT — PATIENT HEALTH QUESTIONNAIRE - PHQ9: CLINICAL INTERPRETATION OF PHQ2 SCORE: 0

## 2024-07-03 ASSESSMENT — FIBROSIS 4 INDEX: FIB4 SCORE: 2.19

## 2024-07-14 DIAGNOSIS — I25.10 CORONARY ARTERY DISEASE INVOLVING NATIVE CORONARY ARTERY OF NATIVE HEART WITHOUT ANGINA PECTORIS: ICD-10-CM

## 2024-07-14 NOTE — TELEPHONE ENCOUNTER
CASE MANAGEMENT DISCHARGE SUMMARY      Discharge to: AND ARU p 1930    Precertification completed: N/A  Hospital Exemption Notification (HENS) completed: N/A    Transportation: in house transport      Confirmed discharge plan with:     Patient: yes per RN     Family:  yes dtr Ruth Ann   [phone]      RN, name: Noelle ARIAS    Note: Discharging nurse to complete FAIZAN, reconcile AVS, and place final copy with patient's discharge packet. RN to ensure that written prescriptions for  Level II medications are sent with patient to the facility as per protocol.        Chart reviewed day 3. Care provided by EP, cards and IM. Pt is from home with daughter. Pt uses a RW at baseline. Family would like for pt to be evaluated for ARU. Referral has been made. Pt had a F/C placed for retention and will plan for a voiding trial tomorrow. BUN and creat trending up at 42 and 1.3. Will continue to follow course for needs. Ronel Martell RN    Follow-Up copy of Important Message from Medicare (IMM2) has been explained to patient and/or designated healthcare decision maker (HCDM). Pt and/or HCDM aware that patient is permitted to stay an additional 4 hours prior to discharge to consider an appeal if they feel as though they are being discharged too soon. Patient may discharge as planned if chooses to do so.  Patient/HCDM voice no other concerns or questions regarding this process.     [facility]   After review, this patient is felt to be:       []  Appropriate for Acute Inpatient Rehab    []  Appropriate for Acute Inpatient Rehab Pending Insurance Authorization    []  Not appropriate for Acute Inpatient Rehab    [x]  Referral received and ARU reviewing patient; Evaluation ongoing.       will round on patient for final determination. D/W Yamilex  Will notify DCP with further updates. Thank you for the referral.  Honey Mills RN     [facility]   After review, this patient is felt to be:       [x]  Appropriate for Acute Inpatient Rehab when medically ready    []  Appropriate for Acute Inpatient Rehab Pending Insurance Authorization    []  Not appropriate for Acute Inpatient Rehab    []  Referral received and ARU reviewing patient; Evaluation ongoing.      Met with patient and provided information on ARU and her and her daughter(via phone) were interested. Discussed case with Dr. Armstrong.    Thank you for the referral.  Honey Mills RN     Plan is for ARU when medically ready. Possibly Sunday. Ronel Martell RN    Was the patient seen in the last year in this department? Yes    Does patient have an active prescription for medications requested? Yes     Do they have a refill on file? No     If a controlled substance, is a new  on file? No     Received Request Via: Pharmacy    If pharmacy requests, is the patient still taking the medication or medication discontinued? Yes    Pt of Dr. Cantrell   [R19.7]  Chronic kidney disease, unspecified CKD stage [N18.9]  Chronic pericardial effusion [I31.39]    Kimberley Alex RN  Case Management Department  Ph: [phone]

## 2024-07-15 RX ORDER — CLOPIDOGREL BISULFATE 75 MG/1
75 TABLET ORAL
Qty: 90 TABLET | Refills: 3 | Status: SHIPPED | OUTPATIENT
Start: 2024-07-15

## 2024-07-30 ENCOUNTER — PATIENT OUTREACH (OUTPATIENT)
Dept: HEALTH INFORMATION MANAGEMENT | Facility: OTHER | Age: 79
End: 2024-07-30
Payer: MEDICARE

## 2024-07-30 DIAGNOSIS — N18.32 TYPE 2 DIABETES MELLITUS WITH STAGE 3B CHRONIC KIDNEY DISEASE, WITH LONG-TERM CURRENT USE OF INSULIN (HCC): ICD-10-CM

## 2024-07-30 DIAGNOSIS — E11.59 HYPERTENSION ASSOCIATED WITH DIABETES (HCC): ICD-10-CM

## 2024-07-30 DIAGNOSIS — E11.22 TYPE 2 DIABETES MELLITUS WITH STAGE 3B CHRONIC KIDNEY DISEASE, WITH LONG-TERM CURRENT USE OF INSULIN (HCC): ICD-10-CM

## 2024-07-30 DIAGNOSIS — Z79.4 TYPE 2 DIABETES MELLITUS WITH STAGE 3B CHRONIC KIDNEY DISEASE, WITH LONG-TERM CURRENT USE OF INSULIN (HCC): ICD-10-CM

## 2024-07-30 DIAGNOSIS — I15.2 HYPERTENSION ASSOCIATED WITH DIABETES (HCC): ICD-10-CM

## 2024-07-31 ENCOUNTER — APPOINTMENT (RX ONLY)
Dept: URBAN - METROPOLITAN AREA CLINIC 35 | Facility: CLINIC | Age: 79
Setting detail: DERMATOLOGY
End: 2024-07-31

## 2024-07-31 DIAGNOSIS — D485 NEOPLASM OF UNCERTAIN BEHAVIOR OF SKIN: ICD-10-CM

## 2024-07-31 DIAGNOSIS — L82.1 OTHER SEBORRHEIC KERATOSIS: ICD-10-CM

## 2024-07-31 DIAGNOSIS — D22 MELANOCYTIC NEVI: ICD-10-CM

## 2024-07-31 DIAGNOSIS — L81.4 OTHER MELANIN HYPERPIGMENTATION: ICD-10-CM

## 2024-07-31 DIAGNOSIS — Z85.828 PERSONAL HISTORY OF OTHER MALIGNANT NEOPLASM OF SKIN: ICD-10-CM

## 2024-07-31 DIAGNOSIS — Z87.2 PERSONAL HISTORY OF DISEASES OF THE SKIN AND SUBCUTANEOUS TISSUE: ICD-10-CM

## 2024-07-31 DIAGNOSIS — L57.0 ACTINIC KERATOSIS: ICD-10-CM

## 2024-07-31 DIAGNOSIS — L24 IRRITANT CONTACT DERMATITIS: ICD-10-CM | Status: INADEQUATELY CONTROLLED

## 2024-07-31 DIAGNOSIS — Z71.89 OTHER SPECIFIED COUNSELING: ICD-10-CM

## 2024-07-31 PROBLEM — D22.5 MELANOCYTIC NEVI OF TRUNK: Status: ACTIVE | Noted: 2024-07-31

## 2024-07-31 PROBLEM — D48.5 NEOPLASM OF UNCERTAIN BEHAVIOR OF SKIN: Status: ACTIVE | Noted: 2024-07-31

## 2024-07-31 PROBLEM — L24.9 IRRITANT CONTACT DERMATITIS, UNSPECIFIED CAUSE: Status: ACTIVE | Noted: 2024-07-31

## 2024-07-31 PROBLEM — D23.5 OTHER BENIGN NEOPLASM OF SKIN OF TRUNK: Status: ACTIVE | Noted: 2024-07-31

## 2024-07-31 PROCEDURE — ? COUNSELING

## 2024-07-31 PROCEDURE — 11102 TANGNTL BX SKIN SINGLE LES: CPT

## 2024-07-31 PROCEDURE — ? PRESCRIPTION

## 2024-07-31 PROCEDURE — ? TREATMENT REGIMEN

## 2024-07-31 PROCEDURE — 17003 DESTRUCT PREMALG LES 2-14: CPT

## 2024-07-31 PROCEDURE — ? LIQUID NITROGEN

## 2024-07-31 PROCEDURE — 99213 OFFICE O/P EST LOW 20 MIN: CPT | Mod: 25

## 2024-07-31 PROCEDURE — ? BIOPSY BY SHAVE METHOD

## 2024-07-31 PROCEDURE — 17000 DESTRUCT PREMALG LESION: CPT | Mod: 59

## 2024-07-31 RX ORDER — HYDROCORTISONE 25 MG/G
THIN LAYER CREAM TOPICAL BID
Qty: 454 | Refills: 0 | Status: ERX | COMMUNITY
Start: 2024-07-31

## 2024-07-31 RX ADMIN — HYDROCORTISONE THIN LAYER: 25 CREAM TOPICAL at 00:00

## 2024-07-31 ASSESSMENT — LOCATION DETAILED DESCRIPTION DERM
LOCATION DETAILED: LEFT CENTRAL MANDIBULAR CHEEK
LOCATION DETAILED: LEFT INFERIOR FOREHEAD
LOCATION DETAILED: LEFT MEDIAL FRONTAL SCALP
LOCATION DETAILED: RIGHT SUPERIOR OCCIPITAL SCALP
LOCATION DETAILED: LEFT SUPERIOR MEDIAL FOREHEAD
LOCATION DETAILED: LEFT ANTERIOR PROXIMAL UPPER ARM
LOCATION DETAILED: LEFT CENTRAL PARIETAL SCALP
LOCATION DETAILED: LEFT ANTIHELIX
LOCATION DETAILED: LEFT POSTERIOR SHOULDER
LOCATION DETAILED: RIGHT POSTERIOR SHOULDER
LOCATION DETAILED: LEFT DISTAL DORSAL FOREARM
LOCATION DETAILED: PERIUMBILICAL SKIN
LOCATION DETAILED: RIGHT DISTAL DORSAL FOREARM
LOCATION DETAILED: LEFT SUPERIOR HELIX
LOCATION DETAILED: RIGHT MEDIAL UPPER BACK
LOCATION DETAILED: LEFT RIB CAGE
LOCATION DETAILED: RIGHT INFERIOR UPPER BACK
LOCATION DETAILED: LEFT SUPERIOR FRONTAL SCALP
LOCATION DETAILED: LEFT CENTRAL LATERAL NECK
LOCATION DETAILED: LEFT DISTAL RADIAL DORSAL FOREARM
LOCATION DETAILED: LEFT FOREHEAD

## 2024-07-31 ASSESSMENT — LOCATION SIMPLE DESCRIPTION DERM
LOCATION SIMPLE: LEFT SCALP
LOCATION SIMPLE: LEFT CHEEK
LOCATION SIMPLE: RIGHT SHOULDER
LOCATION SIMPLE: ABDOMEN
LOCATION SIMPLE: RIGHT UPPER BACK
LOCATION SIMPLE: LEFT FOREHEAD
LOCATION SIMPLE: NECK
LOCATION SIMPLE: LEFT SHOULDER
LOCATION SIMPLE: RIGHT FOREARM
LOCATION SIMPLE: LEFT EAR
LOCATION SIMPLE: LEFT UPPER ARM
LOCATION SIMPLE: LEFT FOREARM
LOCATION SIMPLE: SCALP
LOCATION SIMPLE: POSTERIOR SCALP

## 2024-07-31 ASSESSMENT — LOCATION ZONE DERM
LOCATION ZONE: SCALP
LOCATION ZONE: FACE
LOCATION ZONE: ARM
LOCATION ZONE: TRUNK
LOCATION ZONE: EAR
LOCATION ZONE: NECK

## 2024-07-31 ASSESSMENT — ITCH NUMERIC RATING SCALE: HOW SEVERE IS YOUR ITCHING?: 2

## 2024-07-31 ASSESSMENT — BSA RASH: BSA RASH: 5

## 2024-07-31 NOTE — HPI: RASH
How Severe Is Your Rash?: moderate
Is This A New Presentation, Or A Follow-Up?: Rash
Additional History: Per caregiver urine sometimes leak into the area.

## 2024-07-31 NOTE — PROCEDURE: TREATMENT REGIMEN
Detail Level: Zone
Plan: Recommended covering with petrolatum
Initiate Treatment: Hydrocortisone 2.5% bid for 2 weeks alternating with 2 weeks off

## 2024-08-04 DIAGNOSIS — I10 ESSENTIAL HYPERTENSION, BENIGN: ICD-10-CM

## 2024-08-05 RX ORDER — POTASSIUM CHLORIDE 600 MG/1
8 CAPSULE, EXTENDED RELEASE ORAL
Qty: 45 CAPSULE | Refills: 3 | Status: SHIPPED | OUTPATIENT
Start: 2024-08-05

## 2024-08-05 NOTE — TELEPHONE ENCOUNTER
Is the patient due for a refill? Yes    Was the patient seen the past year? Yes    Date of last office visit: 05/13/2024    Does the patient have an upcoming appointment?  No      Provider to refill:ROSA    Does the patients insurance require a 100 day supply?  No

## 2024-08-24 NOTE — PATIENT INSTRUCTIONS
1) Talk to Dr. Rasheed about Farxiga given your Chronic Kidney Disease Stage 3, and microalbuminuria (protein in urine).  2) Continue with wound care  3) Work on PT on your home exercise plan  4) Urinate on a scheduled interval, every 2hours.  See if this helps to decrease recurrence of UTI.   Anterior

## 2024-08-27 ENCOUNTER — OFFICE VISIT (OUTPATIENT)
Dept: MEDICAL GROUP | Facility: PHYSICIAN GROUP | Age: 79
End: 2024-08-27
Payer: MEDICARE

## 2024-08-27 ENCOUNTER — PATIENT OUTREACH (OUTPATIENT)
Dept: HEALTH INFORMATION MANAGEMENT | Facility: OTHER | Age: 79
End: 2024-08-27

## 2024-08-27 VITALS
BODY MASS INDEX: 26.1 KG/M2 | OXYGEN SATURATION: 95 % | HEART RATE: 66 BPM | SYSTOLIC BLOOD PRESSURE: 102 MMHG | TEMPERATURE: 96.5 F | HEIGHT: 72 IN | WEIGHT: 192.7 LBS | DIASTOLIC BLOOD PRESSURE: 64 MMHG | RESPIRATION RATE: 16 BRPM

## 2024-08-27 DIAGNOSIS — E11.22 TYPE 2 DIABETES MELLITUS WITH STAGE 3B CHRONIC KIDNEY DISEASE, WITH LONG-TERM CURRENT USE OF INSULIN (HCC): ICD-10-CM

## 2024-08-27 DIAGNOSIS — R41.89 COGNITIVE DECLINE: ICD-10-CM

## 2024-08-27 DIAGNOSIS — Z79.4 TYPE 2 DIABETES MELLITUS WITH STAGE 3B CHRONIC KIDNEY DISEASE, WITH LONG-TERM CURRENT USE OF INSULIN (HCC): ICD-10-CM

## 2024-08-27 DIAGNOSIS — N18.32 TYPE 2 DIABETES MELLITUS WITH STAGE 3B CHRONIC KIDNEY DISEASE, WITH LONG-TERM CURRENT USE OF INSULIN (HCC): ICD-10-CM

## 2024-08-27 DIAGNOSIS — Z79.4 LONG-TERM INSULIN USE (HCC): ICD-10-CM

## 2024-08-27 DIAGNOSIS — E55.9 VITAMIN D INSUFFICIENCY: ICD-10-CM

## 2024-08-27 DIAGNOSIS — E78.5 HYPERLIPIDEMIA ASSOCIATED WITH TYPE 2 DIABETES MELLITUS (HCC): ICD-10-CM

## 2024-08-27 DIAGNOSIS — L30.9 DERMATITIS: ICD-10-CM

## 2024-08-27 DIAGNOSIS — M1A.09X0 IDIOPATHIC CHRONIC GOUT OF MULTIPLE SITES WITHOUT TOPHUS: ICD-10-CM

## 2024-08-27 DIAGNOSIS — E11.69 HYPERLIPIDEMIA ASSOCIATED WITH TYPE 2 DIABETES MELLITUS (HCC): ICD-10-CM

## 2024-08-27 RX ORDER — BLOOD-GLUCOSE SENSOR
1 EACH MISCELLANEOUS
Qty: 6 EACH | Refills: 3 | Status: SHIPPED | OUTPATIENT
Start: 2024-08-27

## 2024-08-27 RX ORDER — BLOOD SUGAR DIAGNOSTIC
1 STRIP MISCELLANEOUS
Qty: 400 STRIP | Refills: 3 | Status: SHIPPED | OUTPATIENT
Start: 2024-08-27

## 2024-08-27 RX ORDER — INSULIN GLARGINE 300 U/ML
21-36 INJECTION, SOLUTION SUBCUTANEOUS DAILY
Qty: 7.5 ML | Refills: 3 | Status: SHIPPED | OUTPATIENT
Start: 2024-08-27

## 2024-08-27 ASSESSMENT — FIBROSIS 4 INDEX: FIB4 SCORE: 2.19

## 2024-08-27 NOTE — PROGRESS NOTES
Subjective:   Chief Complaint/History of Present Illness:  Av Bob is a 79 y.o. male established patient who presents today to discuss medical problems as listed below. Av is accompanied by his wife, Usha.    History of Present Illness  The patient is a 80-year-old male who presents for evaluation of multiple medical concerns. He is accompanied by his wife, Usha.    He has been experiencing more high blood sugar levels than low, with the lowest recorded at 77. His nighttime Toujeo dosage has been increased to manage this, Toujeo need has increased from 2-5 units up to 21-36 units. He checks his blood sugar before meals and at bedtime. His blood sugar levels are usually good before dinner but tend to spike after dinner and before bed, even though he takes insulin before dinner. He typically takes 5 to 7 units of Humalog before dinner, occasionally increasing to 9 units if his blood sugar is particularly high. He is considering using a continuous glucose monitor due to difficulties in obtaining test strips from [facility] and is open to trying the [employer] continuous glucose monitor. He also takes Tylenol daily, which seems to aid his sleep.    He has been dealing with a skin issue since July 2024, which started as a small red patch on his left thorax that did not cause pain or itchiness. The patch has since spread in all directions and also on the medial aspect of his left upper extremity which has chronic contracture, he also sleeps on this side.  He was prescribed 2 percent hydrocortisone by his dermatologist, which he used for 2 weeks. The rash appears to be heat-related and worsens when he sweats. It resembles a burn and peels off. He has tried Vaseline over the hydrocortisone, but it did not seem to help much. He has also tried diabetic lotion and Lubriderm. The rash has shown some improvement over the past week. He applies zinc ointment daily in other areas and this can also be tried on the red  area. He has a follow-up appointment with his dermatologist in a few weeks.    He has been showing signs of cognitive decline, with increased periods of confusion. For example, he confused his daughter with his granddaughter and did not remember visiting with them when asked the following day. He does not have trouble recognizing people he sees regularly. He sometimes gets disoriented in his own home, not knowing which way to go after leaving the bathroom such as going to the kitchen to eat versus going to his bedroom to sleep. He often thinks there are people present who are not actually there. He does not engage in conversation as much as he used to. He sleeps a lot during the day and wakes up 2 to 3 times at night. He experienced leg pain last night, which is less frequent now, possibly due to the baclofen. He was advised against taking gabapentin due to the baclofen by physiatry. He used to take gabapentin for leg cramps, which seemed to help, but it took a while to take effect. Changing positions and getting up can alleviate the leg pain. Baclofen seems to help with spasticity at night. His appetite remains good. His exercise therapist suggested using a picture board of his daughter and family for him to look at daily.    His blood pressure is usually on the lower end, but it can be normal at times. He does not experience significant lightheadedness or dizziness when standing up after leaving the bathroom. His urologist believes there is bacterial colonization and advised treating UTIs based on symptoms. Patient's wife has been managing his toenail care. He experiences foot pain, which he attributes to neuropathy. He received the influenza and COVID-19 vaccines in October 2023, he had Covid in January 2024.        Current Medications:  Current Outpatient Medications Ordered in Epic   Medication Sig Dispense Refill    Insulin Glargine, 1 Unit Dial, (TOUJEO SOLOSTAR) 300 UNIT/ML Solution Pen-injector Inject 21-36  Units under the skin every day. 7.5 mL 3    glucose blood (ONETOUCH ULTRA) strip 1 Strip by Other route 4 Times a Day,Before Meals and at Bedtime. 400 Strip 3    Continuous Glucose Sensor (FREESTYLE LLOYD 3 SENSOR) Misc 1 Each every 14 days. 6 Each 3    Potassium Chloride CR (MICRO-K) 8 MEQ Cap CR capsule TAKE 1 CAPSULE BY MOUTH EVERY 48 HOURS 45 Capsule 3    clopidogrel (PLAVIX) 75 MG Tab TAKE 1 TABLET BY MOUTH EVERY DAY 90 Tablet 3    baclofen (LIORESAL) 5 MG Tab Take 1 Tablet by mouth every evening for 180 days. And may consider 1/2 tablet by mouth as needed 60 Tablet 2    atorvastatin (LIPITOR) 80 MG tablet TAKE 1 TABLET BY MOUTH EVERY EVENING 100 Tablet 3    HUMALOG KWIKPEN 100 UNIT/ML Solution Pen-injector injection PEN INJECT 5 UNITS UNDER SKIN 3 TIMES A DAY BEFORE MEALS. 5 UNITS FOR SUGARS= 101-150. INCREASE BY 2 UNITS IF>150. CORRECTION DOSAGE IS 2:50>150 9 mL 3    fluoxetine (PROZAC) 40 MG capsule TAKE 1 CAPSULE BY MOUTH EVERY DAY 90 Capsule 3    carvedilol (COREG) 6.25 MG Tab Take 1 Tablet by mouth 2 times a day with meals. 180 Tablet 3    amLODIPine (NORVASC) 10 MG Tab Take 1 Tablet by mouth every day. 90 Tablet 3    Insulin Pen Needle (PEN NEEDLES) 32G X 4 MM Misc 1 Each 5 Times a Day. USE FOR INSULIN SHOTS FIVE TIMES DAILY 500 Each 3    olmesartan (BENICAR) 40 MG Tab Take 1 Tablet by mouth every evening. 90 Tablet 3    allopurinol (ZYLOPRIM) 100 MG Tab Take 1 Tablet by mouth every evening. 100 Tablet 3    Probiotic Product (PROBIOTIC DAILY) Cap Take  by mouth. Floaster      BOTOX 200 units Recon Soln injection       finasteride (PROSCAR) 5 MG Tab Take 5 mg by mouth every evening.      Multiple Vitamin (MULTIVITAMIN PO) Take 1 Tablet by mouth every morning.      Cholecalciferol (VITAMIN D) 2000 UNIT Tab Take 2,000 Units by mouth every morning.      magnesium oxide (MAG-OX) 400 MG Tab tablet Take 800 mg by mouth 2 times a day.      omeprazole (PRILOSEC) 20 MG delayed-release capsule Take 1 Capsule by  mouth every day. 30 Capsule 2    acetaminophen (TYLENOL) 325 MG Tab Take 2 Tablets by mouth every 6 hours as needed for Mild Pain, Moderate Pain or Fever. 30 Tablet 0     No current Epic-ordered facility-administered medications on file.          Objective:   Physical Exam:    Vitals: /64 (BP Location: Right arm, Patient Position: Sitting, BP Cuff Size: Adult)   Pulse 66   Temp 35.8 °C (96.5 °F) (Temporal)   Resp 16   Ht 1.829 m (6')   Wt 87.4 kg (192 lb 11.2 oz)   SpO2 95%    BMI: Body mass index is 26.13 kg/m².  Physical Exam  Constitutional:       General: He is not in acute distress.     Appearance: He is not ill-appearing.      Comments: Chronically ill appearing   HENT:      Right Ear: External ear normal.      Left Ear: External ear normal.   Eyes:      General: No scleral icterus.     Conjunctiva/sclera: Conjunctivae normal.   Cardiovascular:      Rate and Rhythm: Normal rate and regular rhythm.      Pulses: Normal pulses.   Pulmonary:      Effort: Pulmonary effort is normal. No respiratory distress.      Breath sounds: Normal breath sounds. No wheezing or rhonchi.   Abdominal:      General: Bowel sounds are normal.      Palpations: Abdomen is soft.   Musculoskeletal:      Comments: Left arm contracture   Skin:     General: Skin is warm and dry.      Findings: Rash present.      Comments: Left flank/side and left upper inner arm   Neurological:      Gait: Gait abnormal.   Psychiatric:      Comments: Calm, cognitive impairment, answers simple questions           Results  Laboratory Studies  Kidney function is stable. Blood sugar levels are more high than low, with the lowest recorded at 77. White blood cell count is minimally high.    Assessment & Plan  1. Diabetes Mellitus type 2 on long term insulin with nephropathy and retinopathy.  2. CKD2, improved from CKD3b  His kidney function is improved and stable, showing no signs of stage 3 chronic kidney disease since April 2023. His blood sugar  levels are consistently high, with occasional lows. The use of a continuous glucose monitor was discussed as an alternative to fingerstick testing. A prescription for the Leanna continuous glucose monitor was provided, along with a 3-month supply of sensors. He was advised to contact the office if prior authorization is needed or if Dexcom is preferred by his insurance. His Toujeo prescription was updated to range of 21-36 units, and glucose test strip frequency was changed from three times a day to four times a day before meals and at bedtime. A copy of his blood sugar log was taken for reference. He will follow up with Dr. Jarvis later this fall.    3. Cognitive Decline.  Cognitive decline most likely related to vascular dementia with history of prior strokes and vascular disease, but could also be representative of major neurocognitive decline such as alzheimer's. Medications may play a role and cognition changes can be attributed to baclofen or potentially gabapentin. His blood pressure is consistently low, typically around 100s over 60s. There is no evidence of significant lightheadedness or dizziness upon standing. His white blood cell count is slightly elevated, which could indicate a potential bacterial infection. A blood panel, including urinalysis and blood count, will be ordered to investigate any low-grade urinary issues. He was advised to report any distressing or intrusive symptoms. The possibility of reducing his amlodipine dosage was discussed if he experiences swollen ankles or balance issues while standing and walking.    4. Health Maintenance, vaccine recommendation  Blood work orders were signed, but fasting is not required. He was advised to get the COVID-19 vaccine and influenza vaccine in October 2024. The tetanus vaccine is also due and declined at this time.    5. Dermatitis, left flank and left medial upper arm.  The rash appears to be more of a contact irritant than a fungal infection. It  does not appear to be severe or unusual. He was advised to take periodic photos of the rash to track its progression. The use of zinc ointment for skin healing was recommended. Could be related to dermatitis from urine at night in combination with pressure of how he sleeps? Does not look fungal.    Follow-up  He will follow up in 3 to 4 months via a virtual visit and then in person in February or March 2025.        Assessment and Plan:   Av is a 79 y.o. male with the following:  Problem List Items Addressed This Visit       (HCC) Hyperlipidemia associated with type 2 diabetes mellitus    Relevant Medications    Insulin Glargine, 1 Unit Dial, (TOUJEO SOLOSTAR) 300 UNIT/ML Solution Pen-injector    Other Relevant Orders    FREE THYROXINE    TSH    VITAMIN B12    Lipid Profile    Comp Metabolic Panel    CBC WITH DIFFERENTIAL    Idiopathic chronic gout of multiple sites without tophus    Type 2 diabetes mellitus with stage 3b chronic kidney disease, with long-term current use of insulin (HCC)    Relevant Medications    Insulin Glargine, 1 Unit Dial, (TOUJEO SOLOSTAR) 300 UNIT/ML Solution Pen-injector    glucose blood (ONETOUCH ULTRA) strip    Continuous Glucose Sensor (FREESTYLE LLOYD 3 SENSOR) Misc    Other Relevant Orders    HEMOGLOBIN A1C    Diabetic Monofilament LE Exam (Completed)    Vitamin D insufficiency    Relevant Orders    VITAMIN D,25 HYDROXY (DEFICIENCY)     Other Visit Diagnoses       Long-term insulin use (HCC)        Relevant Medications    Continuous Glucose Sensor (FREESTYLE LLOYD 3 SENSOR) Misc    Dermatitis        Cognitive decline                   RTC: Return in about 3 months (around 11/27/2024) for telemedicine.    I spent a total of 34 minutes with record review, exam, communication with the patient, communication with other providers, and documentation of this encounter.    Verbal consent was acquired by the patient to use App[employer]ive ambient listening note generation during this visit Yes      Billing : secondary to the complexity of this patient's illnesses and their interactions.  All problems listed were discussed during the office visit, medications were evaluated and complexities were discussed as well as plan for the future.      PLEASE NOTE: This dictation was created using voice recognition software. I have made every reasonable attempt to correct obvious errors, but I expect that there are errors of grammar and possibly content that I did not discover before finalizing the note.      Madison Bunch, DO  Geriatric and Internal Medicine  [facility]

## 2024-08-27 NOTE — PROGRESS NOTES
Nurse FREDO met with pt and spouse briefly after PCP appt today. Pt accompanied to appt with spouse and son.  Pt and spouse report no current care management needs. Spouse reports pt has been doing okay. PCP ordered labs at appt today. Pt has history of DM. Pt's last A1C done 2/27/24 was 7.3. Pt and spouse agreeable to outreach call next month from nurse FREDO. Pt assisted  to lobby in wheelchair accompanied by spouse and son.

## 2024-09-06 DIAGNOSIS — R25.2 SPASTICITY: ICD-10-CM

## 2024-09-09 ENCOUNTER — APPOINTMENT (RX ONLY)
Dept: URBAN - METROPOLITAN AREA CLINIC 35 | Facility: CLINIC | Age: 79
Setting detail: DERMATOLOGY
End: 2024-09-09

## 2024-09-09 DIAGNOSIS — L57.0 ACTINIC KERATOSIS: ICD-10-CM

## 2024-09-09 PROBLEM — D04.4 CARCINOMA IN SITU OF SKIN OF SCALP AND NECK: Status: ACTIVE | Noted: 2024-09-09

## 2024-09-09 PROCEDURE — ? CURETTAGE AND DESTRUCTION

## 2024-09-09 PROCEDURE — 17000 DESTRUCT PREMALG LESION: CPT | Mod: 59

## 2024-09-09 PROCEDURE — ? LIQUID NITROGEN

## 2024-09-09 PROCEDURE — 17272 DSTR MAL LES S/N/H/F/G 1.1-2: CPT

## 2024-09-09 PROCEDURE — ? COUNSELING

## 2024-09-09 ASSESSMENT — LOCATION SIMPLE DESCRIPTION DERM: LOCATION SIMPLE: SCALP

## 2024-09-09 ASSESSMENT — LOCATION DETAILED DESCRIPTION DERM: LOCATION DETAILED: LEFT INFERIOR POSTAURICULAR SKIN

## 2024-09-09 ASSESSMENT — LOCATION ZONE DERM: LOCATION ZONE: SCALP

## 2024-09-09 NOTE — PROCEDURE: CURETTAGE AND DESTRUCTION
Detail Level: Detailed
Biopsy Photograph Reviewed: Yes
Number Of Curettages: 3
Size Of Lesion In Cm: 1
Size Of Lesion After Curettage: 2
Add Intralesional Injection: No
Concentration (Mg/Ml Or Millions Of Plaque Forming Units/Cc): 0.01
Anesthesia Type: 0.5% lidocaine without epinephrine
Cautery Type: aluminum chloride
What Was Performed First?: Curettage
Final Size Statement: The size of the lesion after curettage was
Additional Information: (Optional): The wound was cleaned, and a pressure dressing was applied.  The patient received detailed post-op instructions.
Consent was obtained from the patient. The risks, benefits and alternatives to therapy were discussed in detail. Specifically, the risks of infection, scarring, bleeding, prolonged wound healing, nerve injury, incomplete removal, allergy to anesthesia and recurrence were addressed. Alternatives to ED&C, such as: surgical removal were also discussed.  Prior to the procedure, the treatment site was clearly identified and confirmed by the patient. All components of Universal Protocol/PAUSE Rule completed.
Post-Care Instructions: I reviewed with the patient in detail post-care instructions. Keep the biopsy site dry overnight, and then change the dressing once daily and apply a thin layer of petrolatum with a clean q-tip. \\nShowers are OK after 24 hours.  Allow clean water to run over the area.  Do not touch the biopsy site with your hands.  Do not immerse the biopsy site in water such as going into a swimming pool or bathtub until the sutures are removed.  If the biopsy site gets more painful with time, red, or drains, this is concerning for infection.  If you have signs of infection please call the office to come in for a walk in visit Monday through Friday 8:30 am to 12 pm or 1 pm to 4:30 pm.  If we are not in the office, please call through the answering service.
Bill As A Line Item Or As Units: Line Item

## 2024-09-09 NOTE — PROCEDURE: LIQUID NITROGEN
Post-Care Instructions: I reviewed with the patient in detail post-care instructions. Patient is to wear sunprotection, and avoid picking at any of the treated lesions. Pt may apply Vaseline to crusted or scabbing areas.
Consent: The patient's consent was obtained including but not limited to risks of crusting, scabbing, blistering, scarring, darker or lighter pigmentary change, recurrence, incomplete removal and infection.
Render Post-Care Instructions In Note?: no
Detail Level: Detailed
Duration Of Freeze Thaw-Cycle (Seconds): 10
Show Applicator Variable?: Yes
Number Of Freeze-Thaw Cycles: 1 freeze-thaw cycle

## 2024-09-11 NOTE — ASSESSMENT & PLAN NOTE
This problem is chronic, stable, and monitored appropriately by history/labs/imaging as needed, and is well-controlled on the current regimen.  Please continue current regimen   [General Appearance - Alert] : alert [General Appearance - In No Acute Distress] : in no acute distress [General Appearance - Well-Appearing] : healthy appearing [Sclera] : the sclera and conjunctiva were normal [PERRL With Normal Accommodation] : pupils were equal in size, round, reactive to light [Extraocular Movements] : extraocular movements were intact [Outer Ear] : the ears and nose were normal in appearance [Hearing Threshold Finger Rub Not Schleicher] : hearing was normal [Examination Of The Oral Cavity] : the lips and gums were normal [Neck Appearance] : the appearance of the neck was normal [Respiration, Rhythm And Depth] : normal respiratory rhythm and effort [Exaggerated Use Of Accessory Muscles For Inspiration] : no accessory muscle use [Auscultation Breath Sounds / Voice Sounds] : lungs were clear to auscultation bilaterally [Heart Rate And Rhythm] : heart rate was normal and rhythm regular [Heart Sounds] : normal S1 and S2 [Murmurs] : no murmurs [Edema] : there was no peripheral edema [Bowel Sounds] : normal bowel sounds [Abdomen Soft] : soft [Abdomen Tenderness] : non-tender [No Palpable Adenopathy] : no palpable adenopathy [Nail Clubbing] : no clubbing  or cyanosis of the fingernails [Skin Color & Pigmentation] : normal skin color and pigmentation [] : no rash [Cranial Nerves] : cranial nerves 2-12 were intact [No Focal Deficits] : no focal deficits [Oriented To Time, Place, And Person] : oriented to person, place, and time

## 2024-09-30 ENCOUNTER — PATIENT OUTREACH (OUTPATIENT)
Dept: HEALTH INFORMATION MANAGEMENT | Facility: OTHER | Age: 79
End: 2024-09-30
Payer: MEDICARE

## 2024-09-30 DIAGNOSIS — N18.32 TYPE 2 DIABETES MELLITUS WITH STAGE 3B CHRONIC KIDNEY DISEASE, WITH LONG-TERM CURRENT USE OF INSULIN (HCC): ICD-10-CM

## 2024-09-30 DIAGNOSIS — I15.2 HYPERTENSION ASSOCIATED WITH DIABETES (HCC): ICD-10-CM

## 2024-09-30 DIAGNOSIS — Z79.4 TYPE 2 DIABETES MELLITUS WITH STAGE 3B CHRONIC KIDNEY DISEASE, WITH LONG-TERM CURRENT USE OF INSULIN (HCC): ICD-10-CM

## 2024-09-30 DIAGNOSIS — E11.22 TYPE 2 DIABETES MELLITUS WITH STAGE 3B CHRONIC KIDNEY DISEASE, WITH LONG-TERM CURRENT USE OF INSULIN (HCC): ICD-10-CM

## 2024-09-30 DIAGNOSIS — E11.59 HYPERTENSION ASSOCIATED WITH DIABETES (HCC): ICD-10-CM

## 2024-09-30 NOTE — PROGRESS NOTES
Nurse CM outreach call for monthly CCM assessment.  Nurse CM spoke to pt's spouse, Usha Bob.    Assessment    Spouse reports pt has been doing okay.  Reports blood sugars have been in a better range. Spouse reports they are still waiting on Leanna Glucometer from pharmacy. Spouse reports she will follow-up with pharmacy. Spouse reports blood sugar last evening was 129. This am blood sugar was 93. Spouse adjusts Toujeo dose based on bedtime blood sugar. Reports no problem with low readings. Spouse reports pt's skin condition has cleared up after using a zinc ointment. Pt has history of HTN. Last blood pressure at clinic was in good range, 102/64. Spouse reports pt has not had any recent falls.  Reports pt's only problem is feeling tired at times. Spouse will have pt complete labs in the next 1-2 weeks before Dr. Jarvis appt.     Education and Self Management of Care    Continue to monitor blood sugar readings. Fall precautions. Get labs completed as ordered by PCP.  Notify nurse CM if any concerns before next appt with PCP.     Plan of Care and Goals    Reviewed care plan and goals    Barriers:    Chronic health conditions    Progress:    Continue to work on progress    Next outreach:  One month  Nurse Care Coordinator;  Camilla Navarro  [phone]

## 2024-10-07 DIAGNOSIS — N18.32 TYPE 2 DIABETES MELLITUS WITH STAGE 3B CHRONIC KIDNEY DISEASE, WITH LONG-TERM CURRENT USE OF INSULIN (HCC): ICD-10-CM

## 2024-10-07 DIAGNOSIS — Z79.4 LONG-TERM INSULIN USE (HCC): ICD-10-CM

## 2024-10-07 DIAGNOSIS — Z79.4 TYPE 2 DIABETES MELLITUS WITH STAGE 3B CHRONIC KIDNEY DISEASE, WITH LONG-TERM CURRENT USE OF INSULIN (HCC): ICD-10-CM

## 2024-10-07 DIAGNOSIS — E11.22 TYPE 2 DIABETES MELLITUS WITH STAGE 3B CHRONIC KIDNEY DISEASE, WITH LONG-TERM CURRENT USE OF INSULIN (HCC): ICD-10-CM

## 2024-10-07 RX ORDER — KETOROLAC TROMETHAMINE 30 MG/ML
1 INJECTION, SOLUTION INTRAMUSCULAR; INTRAVENOUS ONCE
Qty: 1 EACH | Refills: 0 | Status: SHIPPED | OUTPATIENT
Start: 2024-10-07 | End: 2024-10-07

## 2024-10-07 RX ORDER — BLOOD-GLUCOSE SENSOR
1 EACH MISCELLANEOUS
Qty: 6 EACH | Refills: 3 | Status: SHIPPED | OUTPATIENT
Start: 2024-10-07

## 2024-10-11 ENCOUNTER — OFFICE VISIT (OUTPATIENT)
Dept: PHYSICAL MEDICINE AND REHAB | Facility: MEDICAL CENTER | Age: 79
End: 2024-10-11
Payer: MEDICARE

## 2024-10-11 VITALS
DIASTOLIC BLOOD PRESSURE: 70 MMHG | HEIGHT: 72 IN | TEMPERATURE: 97.7 F | HEART RATE: 66 BPM | OXYGEN SATURATION: 97 % | WEIGHT: 192 LBS | BODY MASS INDEX: 26.01 KG/M2 | SYSTOLIC BLOOD PRESSURE: 98 MMHG

## 2024-10-11 DIAGNOSIS — I69.954 HEMIPLEGIA AND HEMIPARESIS FOLLOWING UNSPECIFIED CEREBROVASCULAR DISEASE AFFECTING LEFT NON-DOMINANT SIDE (HCC): ICD-10-CM

## 2024-10-11 DIAGNOSIS — R25.2 SPASTICITY: ICD-10-CM

## 2024-10-11 PROCEDURE — 64643 CHEMODENERV 1 EXTREM 1-4 EA: CPT | Performed by: GENERAL PRACTICE

## 2024-10-11 PROCEDURE — 3074F SYST BP LT 130 MM HG: CPT | Performed by: GENERAL PRACTICE

## 2024-10-11 PROCEDURE — 76942 ECHO GUIDE FOR BIOPSY: CPT | Performed by: GENERAL PRACTICE

## 2024-10-11 PROCEDURE — 64642 CHEMODENERV 1 EXTREMITY 1-4: CPT | Performed by: GENERAL PRACTICE

## 2024-10-11 PROCEDURE — 1126F AMNT PAIN NOTED NONE PRSNT: CPT | Performed by: GENERAL PRACTICE

## 2024-10-11 PROCEDURE — 1170F FXNL STATUS ASSESSED: CPT | Performed by: GENERAL PRACTICE

## 2024-10-11 PROCEDURE — 3078F DIAST BP <80 MM HG: CPT | Performed by: GENERAL PRACTICE

## 2024-10-11 ASSESSMENT — FIBROSIS 4 INDEX: FIB4 SCORE: 2.19

## 2024-10-11 ASSESSMENT — PATIENT HEALTH QUESTIONNAIRE - PHQ9: CLINICAL INTERPRETATION OF PHQ2 SCORE: 0

## 2024-10-11 ASSESSMENT — PAIN SCALES - GENERAL: PAINLEVEL: NO PAIN

## 2024-10-14 ENCOUNTER — HOSPITAL ENCOUNTER (OUTPATIENT)
Dept: LAB | Facility: MEDICAL CENTER | Age: 79
End: 2024-10-14
Attending: INTERNAL MEDICINE
Payer: MEDICARE

## 2024-10-14 DIAGNOSIS — E55.9 VITAMIN D INSUFFICIENCY: ICD-10-CM

## 2024-10-14 DIAGNOSIS — Z79.4 TYPE 2 DIABETES MELLITUS WITH STAGE 3B CHRONIC KIDNEY DISEASE, WITH LONG-TERM CURRENT USE OF INSULIN (HCC): ICD-10-CM

## 2024-10-14 DIAGNOSIS — E11.22 TYPE 2 DIABETES MELLITUS WITH STAGE 3B CHRONIC KIDNEY DISEASE, WITH LONG-TERM CURRENT USE OF INSULIN (HCC): ICD-10-CM

## 2024-10-14 DIAGNOSIS — E78.5 HYPERLIPIDEMIA ASSOCIATED WITH TYPE 2 DIABETES MELLITUS (HCC): ICD-10-CM

## 2024-10-14 DIAGNOSIS — E11.69 HYPERLIPIDEMIA ASSOCIATED WITH TYPE 2 DIABETES MELLITUS (HCC): ICD-10-CM

## 2024-10-14 DIAGNOSIS — N18.32 TYPE 2 DIABETES MELLITUS WITH STAGE 3B CHRONIC KIDNEY DISEASE, WITH LONG-TERM CURRENT USE OF INSULIN (HCC): ICD-10-CM

## 2024-10-14 LAB
25(OH)D3 SERPL-MCNC: 48 NG/ML (ref 30–100)
ALBUMIN SERPL BCP-MCNC: 3.7 G/DL (ref 3.2–4.9)
ALBUMIN/GLOB SERPL: 1.3 G/DL
ALP SERPL-CCNC: 162 U/L (ref 30–99)
ALT SERPL-CCNC: 28 U/L (ref 2–50)
ANION GAP SERPL CALC-SCNC: 14 MMOL/L (ref 7–16)
AST SERPL-CCNC: 30 U/L (ref 12–45)
BASOPHILS # BLD AUTO: 0.4 % (ref 0–1.8)
BASOPHILS # BLD: 0.06 K/UL (ref 0–0.12)
BILIRUB SERPL-MCNC: 0.8 MG/DL (ref 0.1–1.5)
BUN SERPL-MCNC: 21 MG/DL (ref 8–22)
CALCIUM ALBUM COR SERPL-MCNC: 9.8 MG/DL (ref 8.5–10.5)
CALCIUM SERPL-MCNC: 9.6 MG/DL (ref 8.5–10.5)
CHLORIDE SERPL-SCNC: 102 MMOL/L (ref 96–112)
CHOLEST SERPL-MCNC: 152 MG/DL (ref 100–199)
CO2 SERPL-SCNC: 25 MMOL/L (ref 20–33)
CREAT SERPL-MCNC: 1.14 MG/DL (ref 0.5–1.4)
EOSINOPHIL # BLD AUTO: 0.45 K/UL (ref 0–0.51)
EOSINOPHIL NFR BLD: 3.2 % (ref 0–6.9)
ERYTHROCYTE [DISTWIDTH] IN BLOOD BY AUTOMATED COUNT: 45.1 FL (ref 35.9–50)
EST. AVERAGE GLUCOSE BLD GHB EST-MCNC: 174 MG/DL
GFR SERPLBLD CREATININE-BSD FMLA CKD-EPI: 66 ML/MIN/1.73 M 2
GLOBULIN SER CALC-MCNC: 2.8 G/DL (ref 1.9–3.5)
GLUCOSE SERPL-MCNC: 135 MG/DL (ref 65–99)
HBA1C MFR BLD: 7.7 % (ref 4–5.6)
HCT VFR BLD AUTO: 45 % (ref 42–52)
HDLC SERPL-MCNC: 40 MG/DL
HGB BLD-MCNC: 14.6 G/DL (ref 14–18)
IMM GRANULOCYTES # BLD AUTO: 0.12 K/UL (ref 0–0.11)
IMM GRANULOCYTES NFR BLD AUTO: 0.8 % (ref 0–0.9)
LDLC SERPL CALC-MCNC: 77 MG/DL
LYMPHOCYTES # BLD AUTO: 1.12 K/UL (ref 1–4.8)
LYMPHOCYTES NFR BLD: 7.9 % (ref 22–41)
MCH RBC QN AUTO: 28 PG (ref 27–33)
MCHC RBC AUTO-ENTMCNC: 32.4 G/DL (ref 32.3–36.5)
MCV RBC AUTO: 86.4 FL (ref 81.4–97.8)
MONOCYTES # BLD AUTO: 0.79 K/UL (ref 0–0.85)
MONOCYTES NFR BLD AUTO: 5.6 % (ref 0–13.4)
NEUTROPHILS # BLD AUTO: 11.67 K/UL (ref 1.82–7.42)
NEUTROPHILS NFR BLD: 82.1 % (ref 44–72)
NRBC # BLD AUTO: 0 K/UL
NRBC BLD-RTO: 0 /100 WBC (ref 0–0.2)
PLATELET # BLD AUTO: 257 K/UL (ref 164–446)
PMV BLD AUTO: 10.3 FL (ref 9–12.9)
POTASSIUM SERPL-SCNC: 4.6 MMOL/L (ref 3.6–5.5)
PROT SERPL-MCNC: 6.5 G/DL (ref 6–8.2)
RBC # BLD AUTO: 5.21 M/UL (ref 4.7–6.1)
SODIUM SERPL-SCNC: 141 MMOL/L (ref 135–145)
T4 FREE SERPL-MCNC: 1.24 NG/DL (ref 0.93–1.7)
TRIGL SERPL-MCNC: 176 MG/DL (ref 0–149)
TSH SERPL-ACNC: 2.09 UIU/ML (ref 0.35–5.5)
VIT B12 SERPL-MCNC: 1390 PG/ML (ref 211–911)
WBC # BLD AUTO: 14.2 K/UL (ref 4.8–10.8)

## 2024-10-14 PROCEDURE — 80053 COMPREHEN METABOLIC PANEL: CPT

## 2024-10-14 PROCEDURE — 80061 LIPID PANEL: CPT

## 2024-10-14 PROCEDURE — 82607 VITAMIN B-12: CPT

## 2024-10-14 PROCEDURE — 36415 COLL VENOUS BLD VENIPUNCTURE: CPT

## 2024-10-14 PROCEDURE — 82306 VITAMIN D 25 HYDROXY: CPT

## 2024-10-14 PROCEDURE — 83036 HEMOGLOBIN GLYCOSYLATED A1C: CPT | Mod: GA

## 2024-10-14 PROCEDURE — 84439 ASSAY OF FREE THYROXINE: CPT

## 2024-10-14 PROCEDURE — 85025 COMPLETE CBC W/AUTO DIFF WBC: CPT

## 2024-10-14 PROCEDURE — 84443 ASSAY THYROID STIM HORMONE: CPT

## 2024-10-21 DIAGNOSIS — E11.22 TYPE 2 DIABETES MELLITUS WITH STAGE 3B CHRONIC KIDNEY DISEASE, WITH LONG-TERM CURRENT USE OF INSULIN (HCC): ICD-10-CM

## 2024-10-21 DIAGNOSIS — Z79.4 TYPE 2 DIABETES MELLITUS WITH STAGE 3B CHRONIC KIDNEY DISEASE, WITH LONG-TERM CURRENT USE OF INSULIN (HCC): ICD-10-CM

## 2024-10-21 DIAGNOSIS — N18.32 TYPE 2 DIABETES MELLITUS WITH STAGE 3B CHRONIC KIDNEY DISEASE, WITH LONG-TERM CURRENT USE OF INSULIN (HCC): ICD-10-CM

## 2024-10-29 DIAGNOSIS — E11.59 HYPERTENSION ASSOCIATED WITH DIABETES (HCC): ICD-10-CM

## 2024-10-29 DIAGNOSIS — I15.2 HYPERTENSION ASSOCIATED WITH DIABETES (HCC): ICD-10-CM

## 2024-10-29 RX ORDER — OLMESARTAN MEDOXOMIL 40 MG/1
40 TABLET ORAL EVERY EVENING
Qty: 90 TABLET | Refills: 2 | Status: SHIPPED | OUTPATIENT
Start: 2024-10-29

## 2024-10-31 ENCOUNTER — PATIENT OUTREACH (OUTPATIENT)
Dept: HEALTH INFORMATION MANAGEMENT | Facility: OTHER | Age: 79
End: 2024-10-31
Payer: MEDICARE

## 2024-10-31 DIAGNOSIS — N18.32 TYPE 2 DIABETES MELLITUS WITH STAGE 3B CHRONIC KIDNEY DISEASE, WITH LONG-TERM CURRENT USE OF INSULIN (HCC): ICD-10-CM

## 2024-10-31 DIAGNOSIS — E11.22 TYPE 2 DIABETES MELLITUS WITH STAGE 3B CHRONIC KIDNEY DISEASE, WITH LONG-TERM CURRENT USE OF INSULIN (HCC): ICD-10-CM

## 2024-10-31 DIAGNOSIS — M1A.09X0 IDIOPATHIC CHRONIC GOUT OF MULTIPLE SITES WITHOUT TOPHUS: ICD-10-CM

## 2024-10-31 DIAGNOSIS — Z79.4 TYPE 2 DIABETES MELLITUS WITH STAGE 3B CHRONIC KIDNEY DISEASE, WITH LONG-TERM CURRENT USE OF INSULIN (HCC): ICD-10-CM

## 2024-10-31 DIAGNOSIS — Z79.4 LONG-TERM INSULIN USE (HCC): ICD-10-CM

## 2024-10-31 PROCEDURE — 99490 CHRNC CARE MGMT STAFF 1ST 20: CPT | Performed by: INTERNAL MEDICINE

## 2024-10-31 RX ORDER — ALLOPURINOL 100 MG/1
100 TABLET ORAL EVERY EVENING
Qty: 100 TABLET | Refills: 3 | Status: SHIPPED | OUTPATIENT
Start: 2024-10-31

## 2024-10-31 RX ORDER — BLOOD-GLUCOSE SENSOR
EACH MISCELLANEOUS
COMMUNITY
End: 2024-10-31 | Stop reason: SDUPTHER

## 2024-10-31 RX ORDER — BLOOD-GLUCOSE SENSOR
EACH MISCELLANEOUS
Qty: 6 EACH | Refills: 3 | Status: SHIPPED | OUTPATIENT
Start: 2024-10-31

## 2024-10-31 NOTE — PROGRESS NOTES
10/31/24 11:45 am: Nurse CM outreach call to pt for monthly CCM assessment.  Pt's spouse reports they are currently busy and requesting nurse call back tomorrow.     11/4/24 1:45 pm: Nurse CM outreach call pt for monthly CCM assessment.  VM left requesting a return call to nurse at [phone].    11/5/24 1:30 pm: Nurse CM outreach call to pt's spouse for monthly CCM assessment.  Spouse answered telephone.    Assessment    Spouse reports pt had episode of diarrhea earlier today. Reports pt is now doing better. Reports pt did say he had an episode of feeling dizzy when he woke up this am. Reports pt increased fluids and had breakfast. Reports pt is feeling better.  Spouse reports pt did start new medication, Trulicity. Spouse reports pt's blood sugars have been in a better range since starting Trulicity. Reports morning readings are ranging from 118-138 range. Reports this am reading was 127.  Spouse reports she is adjusting the pm dose of Toujeo based on bedtime blood sugar readings. Spouse reports pt has not had any low readings. Spouse reports they have not received CGMS from pharmacy. Spouse states pharmacist mentioned a prior authorization was needed and paperwork was faxed to PCP office. Nurse will route message to PCP, and MA.     Spouse reports pt has not had any recent falls.    Pt has history of CKD. Last GFR was completed 10/14/24 result was 66. Per spouse pt is scheduled to see kidney specialists tomorrow.     Education and Self-Management of Care    Continue to monitor blood sugar readings. Reviewed hypoglycemia and appropriate treatment.      Plan of Care and Goals    Reviewed care plan and updated. Continue to work on goals of improving A1C.    Barriers:    History of falls    Progress:    Pt has been working on progress.  A1C increased from 7.3 to 7.7. Pt started Trulicity as ordered by PCP.     Next outreach: One month  Nurse Care Coordinator:  Camilla Navarro  [phone]

## 2024-11-05 NOTE — CARE PLAN
Problem: Knowledge deficit of diabetes  Goal: Patient /Spouse educated about diabetes/long term goal  Outcome: Progressing     Problem: Patient barriers to managing diabetes  Goal: Identify patient barriers to managing diabetes/long term goal  Outcome: Progressing     Problem: Knowledge deficit of self-monitoring blood sugars  Goal: Ensure patient has had proper education on self glucose testing/long term goal  Outcome: Progressing/Spouse monitoring blood sugars on a regular basis.

## 2024-11-06 ENCOUNTER — TELEPHONE (OUTPATIENT)
Dept: HEALTH INFORMATION MANAGEMENT | Facility: OTHER | Age: 79
End: 2024-11-06

## 2024-11-06 ENCOUNTER — TELEMEDICINE (OUTPATIENT)
Dept: NEPHROLOGY | Facility: MEDICAL CENTER | Age: 79
End: 2024-11-06
Payer: MEDICARE

## 2024-11-06 VITALS
TEMPERATURE: 97.6 F | SYSTOLIC BLOOD PRESSURE: 138 MMHG | HEIGHT: 72 IN | DIASTOLIC BLOOD PRESSURE: 95 MMHG | WEIGHT: 192 LBS | BODY MASS INDEX: 26.01 KG/M2 | OXYGEN SATURATION: 95 % | HEART RATE: 85 BPM

## 2024-11-06 DIAGNOSIS — N18.2 CKD (CHRONIC KIDNEY DISEASE), STAGE II: ICD-10-CM

## 2024-11-06 DIAGNOSIS — E11.29 MICROALBUMINURIA DUE TO TYPE 2 DIABETES MELLITUS (HCC): ICD-10-CM

## 2024-11-06 DIAGNOSIS — E55.9 VITAMIN D DEFICIENCY: ICD-10-CM

## 2024-11-06 DIAGNOSIS — D64.9 ANEMIA, UNSPECIFIED TYPE: ICD-10-CM

## 2024-11-06 DIAGNOSIS — I10 ESSENTIAL HYPERTENSION: ICD-10-CM

## 2024-11-06 DIAGNOSIS — R80.9 MICROALBUMINURIA DUE TO TYPE 2 DIABETES MELLITUS (HCC): ICD-10-CM

## 2024-11-06 PROCEDURE — 99213 OFFICE O/P EST LOW 20 MIN: CPT | Mod: 95 | Performed by: INTERNAL MEDICINE

## 2024-11-06 PROCEDURE — G2211 COMPLEX E/M VISIT ADD ON: HCPCS | Mod: 95 | Performed by: INTERNAL MEDICINE

## 2024-11-06 ASSESSMENT — ENCOUNTER SYMPTOMS
SHORTNESS OF BREATH: 0
SINUS PAIN: 0
COUGH: 0
NAUSEA: 1
FEVER: 0
WHEEZING: 0
WEIGHT LOSS: 0
EYES NEGATIVE: 1
CHILLS: 0
HEMOPTYSIS: 0
PALPITATIONS: 0
DIZZINESS: 1
VOMITING: 1
ABDOMINAL PAIN: 0
ORTHOPNEA: 0

## 2024-11-06 ASSESSMENT — FIBROSIS 4 INDEX: FIB4 SCORE: 1.7

## 2024-11-06 NOTE — TELEPHONE ENCOUNTER
Pt's spouse left a message on nurse CM telephone reporting pt hasn't been feeling well again today. Reports pt hasn't been feeling good past 2-3 days. Reports pt complaining of abdominal pain. Reports pt also couldn't eat much breakfast and after eating a small amount vomited. Spouse reports pt's Trulicity is due tomorrow and is questioning if she should hold the medication.  Nurse returned call to pt's spouse.  Spouse states pt did have bowel movement earlier today and it was loose.  Reports yesterday pt did have diarrhea. Spouse reports pt's blood sugar this am is 113. Spouse reports pt generally not feeling well. Nurse will route message to PCP.       Spouse also reports she heard from [facility] regarding CGMS. States she was informed the [place] is available to . States she isn't sure about sensors but will check with pharmacy to make sure they are also available.

## 2024-11-06 NOTE — TELEPHONE ENCOUNTER
Nurse spoke to PCP regarding pt's complaint today of abdominal pain, N/V and generally not feeling well.  PCP recommends pt hold Trulicity for now.  PCP mentioned pt may need to resume at a lower dose, 0.75 mg if Trulicity is causing stomach problems. Nurse provided spouse with recommendation from PCP. Spouse will monitor symptoms and will update nurse CM on how pt feeling after not resuming Trulicity. ER precautions discussed with spouse if any worsening abdominal pain or worsening N/V and unable to keep fluids down to go to ER for evaluation. Spouse voiced understanding. Spouse reports pt is taking fluids. States blood sugar is currently in good range at 130. Spouse will continue to monitor symptoms.

## 2024-11-06 NOTE — PROGRESS NOTES
Telemedicine: Established Patient   This evaluation was conducted via Teams using secure and encrypted videoconferencing technology. The patient was in their home in the [facility].    The patient's identity was confirmed and verbal consent was obtained for this virtual visit.    Subjective:   CC:   Chief Complaint   Patient presents with    Chronic Kidney Disease       Av Bob is a 79 y.o. male presenting for evaluation and management of:  CKD II  C/o dizziness, N/V -recently started Trulicity  No edema  No dysuria/flank pain  No fever/chills  CKD II -creat level stable at 1.0-1.1 -baseline  HTN: BP usually well controlled - elevated today could be to N/V  Anemia: Hb stable - WNL  Vit D well controlled    Review of Systems   Constitutional:  Positive for malaise/fatigue. Negative for chills, fever and weight loss.   HENT:  Negative for congestion, hearing loss and sinus pain.    Eyes: Negative.    Respiratory:  Negative for cough, hemoptysis, shortness of breath and wheezing.    Cardiovascular:  Negative for chest pain, palpitations, orthopnea and leg swelling.   Gastrointestinal:  Positive for nausea and vomiting. Negative for abdominal pain.   Skin: Negative.    Neurological:  Positive for dizziness.   All other systems reviewed and are negative.        Allergies   Allergen Reactions    Diphenhydramine Anxiety     Pt is able to tolerate  Mg benadryl with less anxiety    Lorazepam Unspecified     Disorientation    Spironolactone Unspecified     Acute kidney injury    Ciprofloxacin Rash     Rash,stomach ache       Current medicines (including changes today)  Current Outpatient Medications   Medication Sig Dispense Refill    allopurinol (ZYLOPRIM) 100 MG Tab Take 1 Tablet by mouth every evening. 100 Tablet 3    olmesartan (BENICAR) 40 MG Tab TAKE 1 TABLET BY MOUTH EVERY EVENING 90 Tablet 2    Dulaglutide 1.5 MG/0.5ML Solution Pen-injector Inject 1 Pen under the skin every 7 days. 2.24 mL 3     Insulin Glargine, 1 Unit Dial, (TOUJEO SOLOSTAR) 300 UNIT/ML Solution Pen-injector Inject 21-36 Units under the skin every day. 7.5 mL 3    glucose blood (ONETOUCH ULTRA) strip 1 Strip by Other route 4 Times a Day,Before Meals and at Bedtime. 400 Strip 3    Potassium Chloride CR (MICRO-K) 8 MEQ Cap CR capsule TAKE 1 CAPSULE BY MOUTH EVERY 48 HOURS 45 Capsule 3    clopidogrel (PLAVIX) 75 MG Tab TAKE 1 TABLET BY MOUTH EVERY DAY 90 Tablet 3    baclofen (LIORESAL) 5 MG Tab Take 1 Tablet by mouth every evening for 180 days. And may consider 1/2 tablet by mouth as needed 60 Tablet 2    atorvastatin (LIPITOR) 80 MG tablet TAKE 1 TABLET BY MOUTH EVERY EVENING 100 Tablet 3    HUMALOG KWIKPEN 100 UNIT/ML Solution Pen-injector injection PEN INJECT 5 UNITS UNDER SKIN 3 TIMES A DAY BEFORE MEALS. 5 UNITS FOR SUGARS= 101-150. INCREASE BY 2 UNITS IF>150. CORRECTION DOSAGE IS 2:50>150 9 mL 3    fluoxetine (PROZAC) 40 MG capsule TAKE 1 CAPSULE BY MOUTH EVERY DAY 90 Capsule 3    carvedilol (COREG) 6.25 MG Tab Take 1 Tablet by mouth 2 times a day with meals. 180 Tablet 3    amLODIPine (NORVASC) 10 MG Tab Take 1 Tablet by mouth every day. 90 Tablet 3    Insulin Pen Needle (PEN NEEDLES) 32G X 4 MM Misc 1 Each 5 Times a Day. USE FOR INSULIN SHOTS FIVE TIMES DAILY 500 Each 3    Probiotic Product (PROBIOTIC DAILY) Cap Take  by mouth. Floaster      finasteride (PROSCAR) 5 MG Tab Take 5 mg by mouth every evening.      Cholecalciferol (VITAMIN D) 2000 UNIT Tab Take 2,000 Units by mouth every morning.      magnesium oxide (MAG-OX) 400 MG Tab tablet Take 800 mg by mouth 2 times a day.      omeprazole (PRILOSEC) 20 MG delayed-release capsule Take 1 Capsule by mouth every day. 30 Capsule 2    acetaminophen (TYLENOL) 325 MG Tab Take 2 Tablets by mouth every 6 hours as needed for Mild Pain, Moderate Pain or Fever. 30 Tablet 0    Continuous Glucose Sensor (FREESTYLE LLOYD 3 PLUS SENSOR) Misc every 14 days. USE 1 SENSOR EVERY 14 DAYS 6 Each 3     Continuous Glucose Sensor (FREESTYLE LLOYD 3 SENSOR) Misc 1 Each every 14 days. 6 Each 3    Multiple Vitamin (MULTIVITAMIN PO) Take 1 Tablet by mouth every morning.       No current facility-administered medications for this visit.       Patient Active Problem List    Diagnosis Date Noted    Cerebral atrophy (HCC) 08/21/2023    Confusion 06/30/2023    Contracture of muscle of left upper arm- s/p serial casting, severe left elbow pain 06/27/2022    Gout 06/07/2022    Vitamin D insufficiency 06/01/2022    Pressure injury of buttock, stage 1 02/17/2022    Right posterior hip pain and right leg cramping 09/09/2020    ASHLEY (obstructive sleep apnea) 08/27/2020    Nocturnal hypoxemia 07/06/2020    Chronic fatigue 06/09/2020    Essential hypertension, benign 05/06/2020    Polypharmacy 02/04/2020    Benign prostatic hyperplasia with urinary obstruction 01/29/2020    Altered mobility due to old stroke 01/22/2020    Neurogenic bladder 11/19/2019    PVD (peripheral vascular disease) ([facility]) 10/08/2019    GERD with esophagitis 10/19/2018    ([facility]) Left hemiparesis 12/27/2017    Psychophysiological insomnia 11/21/2017    ([facility]) Moderate episode of recurrent major depressive disorder 11/07/2017    Wheelchair dependent 11/07/2017    Hypomagnesemia 08/12/2017    Paroxysmal atrial fibrillation ([facility]) 05/23/2017    Iron deficiency anemia 05/20/2017    H/O non-Hodgkin's lymphoma 05/08/2017    ([facility]) Hypertension associated with diabetes 03/22/2017    Type 2 diabetes mellitus with stage 3b chronic kidney disease, with long-term current use of insulin ([facility]) 12/30/2016    H/O Cerebrovascular accident (CVA) in adulthood 12/30/2016    Coronary artery disease involving native coronary artery 12/30/2016    Idiopathic chronic gout of multiple sites without tophus 01/28/2014    Presence of BMS and drug coated stents in LAD coronary artery 12/13/2011    Presence of drug coated stents in left circumflex coronary artery 12/13/2011    Status post  percutaneous transluminal coronary angioplasty 12/13/2011    ([facility]) Hyperlipidemia associated with type 2 diabetes mellitus 12/13/2011       Family History   Problem Relation Age of Onset    Heart Disease Father         CAD    Diabetes Father     Cancer Mother     Psychiatric Illness Mother         Depression    Depression Mother     Kidney stones Brother     Heart Disease Brother     Psychiatric Illness Brother         Depression    Depression Brother     Suicide Attempts Other     Psychiatric Illness Other         autism       He  has a past medical history of ([facility]) Chronic ulcer of right lower extremity with fat layer exposed (12/27/2018), Acute cystitis without hematuria (6/30/2019), Acute deep vein thrombosis (DVT) of RLE and partial DVT of LLE (6/19/2022), Acute on chronic renal failure ([facility]) (1/21/2020), Acute prostatitis (1/13/2022), Acute respiratory failure with hypoxia ([facility]) (5/20/2017), CAD (coronary artery disease), Cancer ([facility]), Cataract, Cerebrovascular accident (CVA) ([facility]) (12/30/2016), Chickenpox, CKD (chronic kidney disease) stage 3, GFR 30-59 ml/min, Controlled gout (2014), Coronary atherosclerosis of native coronary artery, Daytime sleepiness, Depression, Diabetes ([facility]), Diarrhea (7/2/2019), Difficulty swallowing, Dyschezia (9/22/2022), Dysphagia (3/15/2021), Enterococcal septicemia ([facility]) (8/12/2017), Exposure to SARS-associated coronavirus (10/13/2021), Roberto hematuria (1/29/2020), Frequent urination, GERD (gastroesophageal reflux disease), [place] measles, History of renal calculi (11/19/2019), Hordeolum externum of left lower eyelid (9/14/2021), Hospital discharge follow-up (6/27/2022), Hydronephrosis (1/20/2020), Hypertension (06/30/2021), Hypokalemia (2012), Hypomagnesemia (08/12/2017), Influenza A (1/26/2020), Insomnia, Kidney stone, Left hand weakness (5/20/2019), Leg edema, right (1/22/2020), Lymphoma ([facility]) (2/19/2017), Mixed hyperlipidemia, Muscle strain of right gluteal region  (5/20/2019), Nephrolithiasis (2006), Normocytic hypochromic anemia (5/20/2017), NSTEMI (non-ST elevated myocardial infarction) ([facility]) (07/18/2017), Pain (06/30/2021), Peripheral vascular disease ([facility]), Polyneuropathy in diabetes(357.2) (9/11/2013), Pyelonephritis (5/24/2022), Renal cyst (1/29/2020), Renal mass (1/21/2020), Septic shock ([facility]) (5/20/2017), Skin ulcer of calf ([facility]) (2015), Stage 3b chronic kidney disease (8/25/2016), Stroke ([facility]) (2016), Toenail avulsion, initial encounter- left foot 3rd digit (7/12/2021), Urinary bladder disorder, Urinary incontinence, UTI (urinary tract infection) (5/28/2022), Weakness, and Wound of left leg (2012).    He has no past medical history of Suicide attempt ([facility]).  He  has a past surgical history that includes lithotripsy; angioplasty (1997); wound closure general (4/3/2012); tonsillectomy and adenoidectomy; cataract extraction with iol; solo by laparoscopy (1998); cystoscopy stent placement (Left, 2/12/2018); ureteroscopy (Left, 2/12/2018); lasertripsy (Left, 2/12/2018); [facility] cardiac cath (1997); [facility] cardiac cath (2009); tonsillectomy; lumbar transforaminal epidural steroid injection (Right, 11/2/2020); lumbar transforaminal epidural steroid injection (Right, 3/29/2021); pr upper gi endoscopy,diagnosis (N/A, 7/21/2021); pr upper gi endoscopy,biopsy (N/A, 7/21/2021); and pr inj lumbar/sacral,w/ imaging (7/27/2021).       Objective:   BP (!) 138/95 (BP Location: Right arm, Patient Position: Sitting, BP Cuff Size: Adult)   Pulse 85   Temp 36.4 °C (97.6 °F) (Temporal)   Ht 1.829 m (6')   Wt 87.1 kg (192 lb)   SpO2 95%   BMI 26.04 kg/m²     Physical Exam  Vitals reviewed.   Constitutional:       Appearance: He is ill-appearing.   Neurological:      Mental Status: He is alert and oriented to person, place, and time.   Psychiatric:         Mood and Affect: Mood normal.         Behavior: Behavior normal.         Thought Content: Thought content normal.         Judgment:  Judgment normal.     Laboratory results reviewed: d/w Pt   Latest Reference Range & Units 04/27/24 11:24 10/14/24 11:50   WBC 4.8 - 10.8 K/uL 10.5 14.2 (H)   RBC 4.70 - 6.10 M/uL 5.06 5.21   Hemoglobin 14.0 - 18.0 g/dL 14.3 14.6   Hematocrit 42.0 - 52.0 % 42.9 45.0   MCV 81.4 - 97.8 fL 84.8 86.4   MCH 27.0 - 33.0 pg 28.3 28.0   MCHC 32.3 - 36.5 g/dL 33.3 32.4   RDW 35.9 - 50.0 fL 45.1 45.1   Platelet Count 164 - 446 K/uL 255 257   MPV 9.0 - 12.9 fL 10.5 10.3   Neutrophils-Polys 44.00 - 72.00 %  82.10 (H)   Neutrophils (Absolute) 1.82 - 7.42 K/uL  11.67 (H)   Lymphocytes 22.00 - 41.00 %  7.90 (L)   Lymphs (Absolute) 1.00 - 4.80 K/uL  1.12   Monocytes 0.00 - 13.40 %  5.60   Monos (Absolute) 0.00 - 0.85 K/uL  0.79   Eosinophils 0.00 - 6.90 %  3.20   Eos (Absolute) 0.00 - 0.51 K/uL  0.45   Basophils 0.00 - 1.80 %  0.40   Baso (Absolute) 0.00 - 0.12 K/uL  0.06   Immature Granulocytes 0.00 - 0.90 %  0.80   Immature Granulocytes (abs) 0.00 - 0.11 K/uL  0.12 (H)   Nucleated RBC 0.00 - 0.20 /100 WBC  0.00   NRBC (Absolute) K/uL  0.00   Sodium 135 - 145 mmol/L 136 141   Potassium 3.6 - 5.5 mmol/L 3.9 4.6   Chloride 96 - 112 mmol/L 100 102   Co2 20 - 33 mmol/L 25 25   Anion Gap 7.0 - 16.0  11.0 14.0   Glucose 65 - 99 mg/dL 142 (H) 135 (H)   Bun 8 - 22 mg/dL 22 21   Creatinine 0.50 - 1.40 mg/dL 0.99 1.14   GFR (CKD-EPI) >60 mL/min/1.73 m 2 78 66   Calcium 8.5 - 10.5 mg/dL 9.1 9.6   Correct Calcium 8.5 - 10.5 mg/dL  9.8   AST(SGOT) 12 - 45 U/L  30   ALT(SGPT) 2 - 50 U/L  28   Alkaline Phosphatase 30 - 99 U/L  162 (H)   Total Bilirubin 0.1 - 1.5 mg/dL  0.8   Albumin 3.2 - 4.9 g/dL  3.7   Total Protein 6.0 - 8.2 g/dL  6.5   Globulin 1.9 - 3.5 g/dL  2.8   A-G Ratio g/dL  1.3   Glycohemoglobin 4.0 - 5.6 %  7.7 (H)   Estim. Avg Glu mg/dL  174   Fasting Status  Fasting    Cholesterol,Tot 100 - 199 mg/dL  152   Triglycerides 0 - 149 mg/dL  176 (H)   HDL >=40 mg/dL  40   LDL <100 mg/dL  77   25-Hydroxy   Vitamin D 25 30 - 100 ng/mL  48    Vitamin B12 -True Cobalamin 211 - 911 pg/mL  1390 (H)   (H): Data is abnormally high  (L): Data is abnormally low    Assessment and Plan:   The following treatment plan was discussed:     1. CKD (chronic kidney disease), stage II  - Basic Metabolic Panel; Future    2. Essential hypertension  - Basic Metabolic Panel; Future    3. Anemia, unspecified type    4. Microalbuminuria due to type 2 diabetes mellitus (HCC)    5. Vitamin D deficiency    Recs:  Continue current treatment  Contact Primary Doctor regarding Trulicity  Keep well hydrated  In no improvement in N/V -to ER  Low salt diet  Avoid NSAID's    Follow-up: Return in about 7 months (around 6/6/2025).

## 2024-11-06 NOTE — PATIENT INSTRUCTIONS
Continue current treatment  Contact Primary Doctor regarding Trulicity  Keep well hydrated  In no improvement in N/V -to ER  Low salt diet  Avoid NSAID's

## 2024-11-07 DIAGNOSIS — E11.22 TYPE 2 DIABETES MELLITUS WITH STAGE 3B CHRONIC KIDNEY DISEASE, WITH LONG-TERM CURRENT USE OF INSULIN (HCC): ICD-10-CM

## 2024-11-07 DIAGNOSIS — Z79.4 LONG-TERM INSULIN USE (HCC): ICD-10-CM

## 2024-11-07 DIAGNOSIS — N18.32 TYPE 2 DIABETES MELLITUS WITH STAGE 3B CHRONIC KIDNEY DISEASE, WITH LONG-TERM CURRENT USE OF INSULIN (HCC): ICD-10-CM

## 2024-11-07 DIAGNOSIS — Z79.4 TYPE 2 DIABETES MELLITUS WITH STAGE 3B CHRONIC KIDNEY DISEASE, WITH LONG-TERM CURRENT USE OF INSULIN (HCC): ICD-10-CM

## 2024-11-07 RX ORDER — ACYCLOVIR 800 MG/1
1 TABLET ORAL
Qty: 6 EACH | Refills: 3 | Status: SHIPPED | OUTPATIENT
Start: 2024-11-07 | End: 2024-11-13 | Stop reason: SDUPTHER

## 2024-11-07 NOTE — TELEPHONE ENCOUNTER
Nurse outreach call to pt's spouse for follow-up.  Spouse reports pt just woke up a little while ago and so far today is feeling good. Reports no N/V this am, reports no abdominal pain.  Pt informed spouse that he is hungry this am.  Pt's blood sugar reading while nurse talking to spouse is 118. Spouse will hold pt's Trulicity.  Reports no concerns at this time. Update to PCP.

## 2024-11-11 DIAGNOSIS — N18.32 TYPE 2 DIABETES MELLITUS WITH STAGE 3B CHRONIC KIDNEY DISEASE, WITH LONG-TERM CURRENT USE OF INSULIN (HCC): ICD-10-CM

## 2024-11-11 DIAGNOSIS — Z79.4 TYPE 2 DIABETES MELLITUS WITH STAGE 3B CHRONIC KIDNEY DISEASE, WITH LONG-TERM CURRENT USE OF INSULIN (HCC): ICD-10-CM

## 2024-11-11 DIAGNOSIS — Z79.4 LONG-TERM INSULIN USE (HCC): ICD-10-CM

## 2024-11-11 DIAGNOSIS — E11.22 TYPE 2 DIABETES MELLITUS WITH STAGE 3B CHRONIC KIDNEY DISEASE, WITH LONG-TERM CURRENT USE OF INSULIN (HCC): ICD-10-CM

## 2024-11-11 RX ORDER — HYDROCHLOROTHIAZIDE 12.5 MG/1
1 CAPSULE ORAL SEE ADMIN INSTRUCTIONS
Qty: 6 EACH | Refills: 3 | Status: SHIPPED | OUTPATIENT
Start: 2024-11-11

## 2024-11-13 DIAGNOSIS — Z79.4 LONG-TERM INSULIN USE (HCC): ICD-10-CM

## 2024-11-13 DIAGNOSIS — N18.32 TYPE 2 DIABETES MELLITUS WITH STAGE 3B CHRONIC KIDNEY DISEASE, WITH LONG-TERM CURRENT USE OF INSULIN (HCC): ICD-10-CM

## 2024-11-13 DIAGNOSIS — Z79.4 TYPE 2 DIABETES MELLITUS WITH STAGE 3B CHRONIC KIDNEY DISEASE, WITH LONG-TERM CURRENT USE OF INSULIN (HCC): ICD-10-CM

## 2024-11-13 DIAGNOSIS — E11.22 TYPE 2 DIABETES MELLITUS WITH STAGE 3B CHRONIC KIDNEY DISEASE, WITH LONG-TERM CURRENT USE OF INSULIN (HCC): ICD-10-CM

## 2024-11-13 RX ORDER — ACYCLOVIR 800 MG/1
1 TABLET ORAL
Qty: 6 EACH | Refills: 3 | Status: SHIPPED | OUTPATIENT
Start: 2024-11-13 | End: 2024-11-18

## 2024-11-15 ENCOUNTER — HOSPITAL ENCOUNTER (INPATIENT)
Facility: MEDICAL CENTER | Age: 79
LOS: 1 days | DRG: 315 | End: 2024-11-16
Attending: EMERGENCY MEDICINE | Admitting: HOSPITALIST
Payer: MEDICARE

## 2024-11-15 ENCOUNTER — APPOINTMENT (OUTPATIENT)
Dept: RADIOLOGY | Facility: MEDICAL CENTER | Age: 79
DRG: 315 | End: 2024-11-15
Attending: EMERGENCY MEDICINE
Payer: MEDICARE

## 2024-11-15 DIAGNOSIS — N39.0 ACUTE UTI: ICD-10-CM

## 2024-11-15 DIAGNOSIS — R41.82 ALTERED MENTAL STATUS, UNSPECIFIED ALTERED MENTAL STATUS TYPE: ICD-10-CM

## 2024-11-15 DIAGNOSIS — R47.1 DYSARTHRIA: ICD-10-CM

## 2024-11-15 PROBLEM — I95.9 HYPOTENSION ARTERIAL: Status: ACTIVE | Noted: 2024-11-15

## 2024-11-15 LAB
ALBUMIN SERPL BCP-MCNC: 2.8 G/DL (ref 3.2–4.9)
ALBUMIN/GLOB SERPL: 1 G/DL
ALP SERPL-CCNC: 160 U/L (ref 30–99)
ALT SERPL-CCNC: 19 U/L (ref 2–50)
ANION GAP SERPL CALC-SCNC: 10 MMOL/L (ref 7–16)
APPEARANCE UR: ABNORMAL
AST SERPL-CCNC: 20 U/L (ref 12–45)
BACTERIA #/AREA URNS HPF: ABNORMAL /HPF
BASOPHILS # BLD AUTO: 0.5 % (ref 0–1.8)
BASOPHILS # BLD: 0.05 K/UL (ref 0–0.12)
BILIRUB SERPL-MCNC: 0.6 MG/DL (ref 0.1–1.5)
BILIRUB UR QL STRIP.AUTO: NEGATIVE
BUN SERPL-MCNC: 22 MG/DL (ref 8–22)
CALCIUM ALBUM COR SERPL-MCNC: 9.4 MG/DL (ref 8.5–10.5)
CALCIUM SERPL-MCNC: 8.4 MG/DL (ref 8.4–10.2)
CHLORIDE SERPL-SCNC: 105 MMOL/L (ref 96–112)
CO2 SERPL-SCNC: 20 MMOL/L (ref 20–33)
COLOR UR: YELLOW
CREAT SERPL-MCNC: 1.15 MG/DL (ref 0.5–1.4)
EOSINOPHIL # BLD AUTO: 0.31 K/UL (ref 0–0.51)
EOSINOPHIL NFR BLD: 3.1 % (ref 0–6.9)
EPI CELLS #/AREA URNS HPF: ABNORMAL /HPF
ERYTHROCYTE [DISTWIDTH] IN BLOOD BY AUTOMATED COUNT: 44.1 FL (ref 35.9–50)
GFR SERPLBLD CREATININE-BSD FMLA CKD-EPI: 65 ML/MIN/1.73 M 2
GLOBULIN SER CALC-MCNC: 2.8 G/DL (ref 1.9–3.5)
GLUCOSE BLD STRIP.AUTO-MCNC: 141 MG/DL (ref 65–99)
GLUCOSE BLD STRIP.AUTO-MCNC: 154 MG/DL (ref 65–99)
GLUCOSE SERPL-MCNC: 179 MG/DL (ref 65–99)
GLUCOSE UR STRIP.AUTO-MCNC: NEGATIVE MG/DL
HCT VFR BLD AUTO: 40.5 % (ref 42–52)
HGB BLD-MCNC: 13.2 G/DL (ref 14–18)
IMM GRANULOCYTES # BLD AUTO: 0.15 K/UL (ref 0–0.11)
IMM GRANULOCYTES NFR BLD AUTO: 1.5 % (ref 0–0.9)
KETONES UR STRIP.AUTO-MCNC: NEGATIVE MG/DL
LACTATE SERPL-SCNC: 2.7 MMOL/L (ref 0.5–2)
LEUKOCYTE ESTERASE UR QL STRIP.AUTO: ABNORMAL
LYMPHOCYTES # BLD AUTO: 1.16 K/UL (ref 1–4.8)
LYMPHOCYTES NFR BLD: 11.4 % (ref 22–41)
MCH RBC QN AUTO: 28.5 PG (ref 27–33)
MCHC RBC AUTO-ENTMCNC: 32.6 G/DL (ref 32.3–36.5)
MCV RBC AUTO: 87.5 FL (ref 81.4–97.8)
MICRO URNS: ABNORMAL
MONOCYTES # BLD AUTO: 0.74 K/UL (ref 0–0.85)
MONOCYTES NFR BLD AUTO: 7.3 % (ref 0–13.4)
NEUTROPHILS # BLD AUTO: 7.74 K/UL (ref 1.82–7.42)
NEUTROPHILS NFR BLD: 76.2 % (ref 44–72)
NITRITE UR QL STRIP.AUTO: NEGATIVE
NRBC # BLD AUTO: 0 K/UL
NRBC BLD-RTO: 0 /100 WBC (ref 0–0.2)
PH UR STRIP.AUTO: 6.5 [PH] (ref 5–8)
PLATELET # BLD AUTO: 234 K/UL (ref 164–446)
PMV BLD AUTO: 9.9 FL (ref 9–12.9)
POTASSIUM SERPL-SCNC: 4 MMOL/L (ref 3.6–5.5)
PROCALCITONIN SERPL-MCNC: 0.07 NG/ML
PROT SERPL-MCNC: 5.6 G/DL (ref 6–8.2)
PROT UR QL STRIP: 30 MG/DL
RBC # BLD AUTO: 4.63 M/UL (ref 4.7–6.1)
RBC # URNS HPF: ABNORMAL /HPF
RBC UR QL AUTO: ABNORMAL
SODIUM SERPL-SCNC: 135 MMOL/L (ref 135–145)
SP GR UR STRIP.AUTO: 1.02
TROPONIN T SERPL-MCNC: 30 NG/L (ref 6–19)
WBC # BLD AUTO: 10.2 K/UL (ref 4.8–10.8)
WBC #/AREA URNS HPF: ABNORMAL /HPF

## 2024-11-15 PROCEDURE — 770020 HCHG ROOM/CARE - TELE (206)

## 2024-11-15 PROCEDURE — A9270 NON-COVERED ITEM OR SERVICE: HCPCS | Performed by: HOSPITALIST

## 2024-11-15 PROCEDURE — 70450 CT HEAD/BRAIN W/O DYE: CPT

## 2024-11-15 PROCEDURE — 84484 ASSAY OF TROPONIN QUANT: CPT

## 2024-11-15 PROCEDURE — 84145 PROCALCITONIN (PCT): CPT

## 2024-11-15 PROCEDURE — 36415 COLL VENOUS BLD VENIPUNCTURE: CPT

## 2024-11-15 PROCEDURE — 99285 EMERGENCY DEPT VISIT HI MDM: CPT

## 2024-11-15 PROCEDURE — 83605 ASSAY OF LACTIC ACID: CPT

## 2024-11-15 PROCEDURE — 81001 URINALYSIS AUTO W/SCOPE: CPT

## 2024-11-15 PROCEDURE — 700102 HCHG RX REV CODE 250 W/ 637 OVERRIDE(OP): Performed by: EMERGENCY MEDICINE

## 2024-11-15 PROCEDURE — 82962 GLUCOSE BLOOD TEST: CPT

## 2024-11-15 PROCEDURE — A9270 NON-COVERED ITEM OR SERVICE: HCPCS | Performed by: EMERGENCY MEDICINE

## 2024-11-15 PROCEDURE — 87186 SC STD MICRODIL/AGAR DIL: CPT

## 2024-11-15 PROCEDURE — 87086 URINE CULTURE/COLONY COUNT: CPT

## 2024-11-15 PROCEDURE — 94760 N-INVAS EAR/PLS OXIMETRY 1: CPT

## 2024-11-15 PROCEDURE — 80053 COMPREHEN METABOLIC PANEL: CPT

## 2024-11-15 PROCEDURE — 51701 INSERT BLADDER CATHETER: CPT

## 2024-11-15 PROCEDURE — 85025 COMPLETE CBC W/AUTO DIFF WBC: CPT

## 2024-11-15 PROCEDURE — 87077 CULTURE AEROBIC IDENTIFY: CPT

## 2024-11-15 PROCEDURE — 700105 HCHG RX REV CODE 258: Performed by: HOSPITALIST

## 2024-11-15 PROCEDURE — 700102 HCHG RX REV CODE 250 W/ 637 OVERRIDE(OP): Performed by: HOSPITALIST

## 2024-11-15 PROCEDURE — 99223 1ST HOSP IP/OBS HIGH 75: CPT | Performed by: HOSPITALIST

## 2024-11-15 RX ORDER — AMOXICILLIN 250 MG
2 CAPSULE ORAL EVERY EVENING
Status: DISCONTINUED | OUTPATIENT
Start: 2024-11-15 | End: 2024-11-16 | Stop reason: HOSPADM

## 2024-11-15 RX ORDER — POTASSIUM CHLORIDE 1500 MG/1
10 TABLET, EXTENDED RELEASE ORAL
Status: DISCONTINUED | OUTPATIENT
Start: 2024-11-16 | End: 2024-11-16 | Stop reason: HOSPADM

## 2024-11-15 RX ORDER — OXYCODONE HYDROCHLORIDE 5 MG/1
5 TABLET ORAL
Status: DISCONTINUED | OUTPATIENT
Start: 2024-11-15 | End: 2024-11-16 | Stop reason: HOSPADM

## 2024-11-15 RX ORDER — OLMESARTAN MEDOXOMIL 20 MG/1
40 TABLET ORAL EVERY EVENING
Status: DISCONTINUED | OUTPATIENT
Start: 2024-11-15 | End: 2024-11-16 | Stop reason: HOSPADM

## 2024-11-15 RX ORDER — ACETAMINOPHEN 500 MG
500-1000 TABLET ORAL EVERY 6 HOURS PRN
COMMUNITY

## 2024-11-15 RX ORDER — CLOPIDOGREL BISULFATE 75 MG/1
75 TABLET ORAL DAILY
Status: DISCONTINUED | OUTPATIENT
Start: 2024-11-15 | End: 2024-11-16 | Stop reason: HOSPADM

## 2024-11-15 RX ORDER — ATORVASTATIN CALCIUM 40 MG/1
80 TABLET, FILM COATED ORAL EVERY EVENING
Status: DISCONTINUED | OUTPATIENT
Start: 2024-11-15 | End: 2024-11-16 | Stop reason: HOSPADM

## 2024-11-15 RX ORDER — ACETAMINOPHEN 325 MG/1
650 TABLET ORAL ONCE
Status: COMPLETED | OUTPATIENT
Start: 2024-11-15 | End: 2024-11-15

## 2024-11-15 RX ORDER — ACETAMINOPHEN 325 MG/1
650 TABLET ORAL EVERY 6 HOURS PRN
Status: DISCONTINUED | OUTPATIENT
Start: 2024-11-15 | End: 2024-11-16 | Stop reason: HOSPADM

## 2024-11-15 RX ORDER — HYDROMORPHONE HYDROCHLORIDE 1 MG/ML
0.25 INJECTION, SOLUTION INTRAMUSCULAR; INTRAVENOUS; SUBCUTANEOUS
Status: DISCONTINUED | OUTPATIENT
Start: 2024-11-15 | End: 2024-11-16 | Stop reason: HOSPADM

## 2024-11-15 RX ORDER — ONDANSETRON 4 MG/1
4 TABLET, ORALLY DISINTEGRATING ORAL EVERY 4 HOURS PRN
Status: DISCONTINUED | OUTPATIENT
Start: 2024-11-15 | End: 2024-11-16 | Stop reason: HOSPADM

## 2024-11-15 RX ORDER — POLYETHYLENE GLYCOL 3350 17 G/17G
1 POWDER, FOR SOLUTION ORAL
Status: DISCONTINUED | OUTPATIENT
Start: 2024-11-15 | End: 2024-11-16 | Stop reason: HOSPADM

## 2024-11-15 RX ORDER — VITAMIN B COMPLEX
2000 TABLET ORAL EVERY MORNING
Status: DISCONTINUED | OUTPATIENT
Start: 2024-11-16 | End: 2024-11-16 | Stop reason: HOSPADM

## 2024-11-15 RX ORDER — ALLOPURINOL 100 MG/1
100 TABLET ORAL EVERY EVENING
Status: DISCONTINUED | OUTPATIENT
Start: 2024-11-15 | End: 2024-11-16 | Stop reason: HOSPADM

## 2024-11-15 RX ORDER — ONDANSETRON 2 MG/ML
4 INJECTION INTRAMUSCULAR; INTRAVENOUS EVERY 4 HOURS PRN
Status: DISCONTINUED | OUTPATIENT
Start: 2024-11-15 | End: 2024-11-16 | Stop reason: HOSPADM

## 2024-11-15 RX ORDER — FINASTERIDE 5 MG/1
5 TABLET, FILM COATED ORAL EVERY EVENING
Status: DISCONTINUED | OUTPATIENT
Start: 2024-11-15 | End: 2024-11-16 | Stop reason: HOSPADM

## 2024-11-15 RX ORDER — LABETALOL HYDROCHLORIDE 5 MG/ML
10 INJECTION, SOLUTION INTRAVENOUS EVERY 4 HOURS PRN
Status: DISCONTINUED | OUTPATIENT
Start: 2024-11-15 | End: 2024-11-16 | Stop reason: HOSPADM

## 2024-11-15 RX ORDER — CARVEDILOL 6.25 MG/1
6.25 TABLET ORAL 2 TIMES DAILY WITH MEALS
Status: DISCONTINUED | OUTPATIENT
Start: 2024-11-15 | End: 2024-11-16 | Stop reason: HOSPADM

## 2024-11-15 RX ORDER — CEFAZOLIN SODIUM 1 G/3ML
1 INJECTION, POWDER, FOR SOLUTION INTRAMUSCULAR; INTRAVENOUS ONCE
Status: DISCONTINUED | OUTPATIENT
Start: 2024-11-15 | End: 2024-11-15

## 2024-11-15 RX ORDER — HEPARIN SODIUM 5000 [USP'U]/ML
5000 INJECTION, SOLUTION INTRAVENOUS; SUBCUTANEOUS EVERY 8 HOURS
Status: DISCONTINUED | OUTPATIENT
Start: 2024-11-15 | End: 2024-11-16 | Stop reason: HOSPADM

## 2024-11-15 RX ORDER — MORPHINE SULFATE 4 MG/ML
4 INJECTION INTRAVENOUS ONCE
Status: DISCONTINUED | OUTPATIENT
Start: 2024-11-15 | End: 2024-11-16 | Stop reason: HOSPADM

## 2024-11-15 RX ORDER — SODIUM CHLORIDE 9 MG/ML
INJECTION, SOLUTION INTRAVENOUS CONTINUOUS
Status: DISCONTINUED | OUTPATIENT
Start: 2024-11-15 | End: 2024-11-16 | Stop reason: HOSPADM

## 2024-11-15 RX ORDER — OXYCODONE HYDROCHLORIDE 5 MG/1
2.5 TABLET ORAL
Status: DISCONTINUED | OUTPATIENT
Start: 2024-11-15 | End: 2024-11-16 | Stop reason: HOSPADM

## 2024-11-15 RX ORDER — INSULIN LISPRO 100 [IU]/ML
2-9 INJECTION, SOLUTION INTRAVENOUS; SUBCUTANEOUS
Status: DISCONTINUED | OUTPATIENT
Start: 2024-11-15 | End: 2024-11-16 | Stop reason: HOSPADM

## 2024-11-15 RX ORDER — DEXTROSE MONOHYDRATE 25 G/50ML
25 INJECTION, SOLUTION INTRAVENOUS
Status: DISCONTINUED | OUTPATIENT
Start: 2024-11-15 | End: 2024-11-16 | Stop reason: HOSPADM

## 2024-11-15 RX ADMIN — FINASTERIDE 5 MG: 5 TABLET, FILM COATED ORAL at 18:32

## 2024-11-15 RX ADMIN — OLMESARTAN MEDOXOMIL 40 MG: 20 TABLET, FILM COATED ORAL at 18:33

## 2024-11-15 RX ADMIN — CARVEDILOL 6.25 MG: 6.25 TABLET, FILM COATED ORAL at 18:33

## 2024-11-15 RX ADMIN — CLOPIDOGREL BISULFATE 75 MG: 75 TABLET ORAL at 18:33

## 2024-11-15 RX ADMIN — INSULIN LISPRO 2 UNITS: 100 INJECTION, SOLUTION INTRAVENOUS; SUBCUTANEOUS at 21:23

## 2024-11-15 RX ADMIN — ALLOPURINOL 100 MG: 100 TABLET ORAL at 18:33

## 2024-11-15 RX ADMIN — ATORVASTATIN CALCIUM 80 MG: 40 TABLET, FILM COATED ORAL at 18:33

## 2024-11-15 RX ADMIN — ACETAMINOPHEN 650 MG: 325 TABLET ORAL at 15:31

## 2024-11-15 RX ADMIN — SODIUM CHLORIDE 1000 ML: 9 INJECTION, SOLUTION INTRAVENOUS at 16:30

## 2024-11-15 SDOH — ECONOMIC STABILITY: TRANSPORTATION INSECURITY
IN THE PAST 12 MONTHS, HAS LACK OF RELIABLE TRANSPORTATION KEPT YOU FROM MEDICAL APPOINTMENTS, MEETINGS, WORK OR FROM GETTING THINGS NEEDED FOR DAILY LIVING?: NO

## 2024-11-15 ASSESSMENT — PATIENT HEALTH QUESTIONNAIRE - PHQ9
7. TROUBLE CONCENTRATING ON THINGS, SUCH AS READING THE NEWSPAPER OR WATCHING TELEVISION: NOT AT ALL
4. FEELING TIRED OR HAVING LITTLE ENERGY: SEVERAL DAYS
9. THOUGHTS THAT YOU WOULD BE BETTER OFF DEAD, OR OF HURTING YOURSELF: NOT AT ALL
SUM OF ALL RESPONSES TO PHQ QUESTIONS 1-9: 3
SUM OF ALL RESPONSES TO PHQ9 QUESTIONS 1 AND 2: 1
1. LITTLE INTEREST OR PLEASURE IN DOING THINGS: NOT AT ALL
5. POOR APPETITE OR OVEREATING: SEVERAL DAYS
8. MOVING OR SPEAKING SO SLOWLY THAT OTHER PEOPLE COULD HAVE NOTICED. OR THE OPPOSITE, BEING SO FIGETY OR RESTLESS THAT YOU HAVE BEEN MOVING AROUND A LOT MORE THAN USUAL: NOT AT ALL
3. TROUBLE FALLING OR STAYING ASLEEP OR SLEEPING TOO MUCH: NOT AT ALL
2. FEELING DOWN, DEPRESSED, IRRITABLE, OR HOPELESS: SEVERAL DAYS
6. FEELING BAD ABOUT YOURSELF - OR THAT YOU ARE A FAILURE OR HAVE LET YOURSELF OR YOUR FAMILY DOWN: NOT AL ALL

## 2024-11-15 ASSESSMENT — LIFESTYLE VARIABLES
HOW MANY TIMES IN THE PAST YEAR HAVE YOU HAD 5 OR MORE DRINKS IN A DAY: 0
ALCOHOL_USE: NO
HAVE YOU EVER FELT YOU SHOULD CUT DOWN ON YOUR DRINKING: NO
HAVE PEOPLE ANNOYED YOU BY CRITICIZING YOUR DRINKING: NO
TOTAL SCORE: 0
CONSUMPTION TOTAL: NEGATIVE
ON A TYPICAL DAY WHEN YOU DRINK ALCOHOL HOW MANY DRINKS DO YOU HAVE: 0
EVER HAD A DRINK FIRST THING IN THE MORNING TO STEADY YOUR NERVES TO GET RID OF A HANGOVER: NO
EVER FELT BAD OR GUILTY ABOUT YOUR DRINKING: NO
TOTAL SCORE: 0
AVERAGE NUMBER OF DAYS PER WEEK YOU HAVE A DRINK CONTAINING ALCOHOL: 0
TOTAL SCORE: 0

## 2024-11-15 ASSESSMENT — COGNITIVE AND FUNCTIONAL STATUS - GENERAL
HELP NEEDED FOR BATHING: TOTAL
MOVING FROM LYING ON BACK TO SITTING ON SIDE OF FLAT BED: TOTAL
EATING MEALS: A LITTLE
CLIMB 3 TO 5 STEPS WITH RAILING: TOTAL
DAILY ACTIVITIY SCORE: 12
SUGGESTED CMS G CODE MODIFIER DAILY ACTIVITY: CL
PERSONAL GROOMING: A LITTLE
TOILETING: A LOT
SUGGESTED CMS G CODE MODIFIER MOBILITY: CN
MOVING TO AND FROM BED TO CHAIR: TOTAL
MOBILITY SCORE: 6
DRESSING REGULAR LOWER BODY CLOTHING: TOTAL
TURNING FROM BACK TO SIDE WHILE IN FLAT BAD: TOTAL
STANDING UP FROM CHAIR USING ARMS: TOTAL
DRESSING REGULAR UPPER BODY CLOTHING: A LOT
WALKING IN HOSPITAL ROOM: TOTAL

## 2024-11-15 ASSESSMENT — SOCIAL DETERMINANTS OF HEALTH (SDOH)
WITHIN THE PAST 12 MONTHS, YOU WORRIED THAT YOUR FOOD WOULD RUN OUT BEFORE YOU GOT THE MONEY TO BUY MORE: NEVER TRUE
IN THE PAST 12 MONTHS, HAS THE ELECTRIC, GAS, OIL, OR WATER COMPANY THREATENED TO SHUT OFF SERVICE IN YOUR HOME?: NO
WITHIN THE LAST YEAR, HAVE YOU BEEN HUMILIATED OR EMOTIONALLY ABUSED IN OTHER WAYS BY YOUR PARTNER OR EX-PARTNER?: NO
WITHIN THE PAST 12 MONTHS, THE FOOD YOU BOUGHT JUST DIDN'T LAST AND YOU DIDN'T HAVE MONEY TO GET MORE: NEVER TRUE
WITHIN THE LAST YEAR, HAVE YOU BEEN KICKED, HIT, SLAPPED, OR OTHERWISE PHYSICALLY HURT BY YOUR PARTNER OR EX-PARTNER?: NO
WITHIN THE LAST YEAR, HAVE TO BEEN RAPED OR FORCED TO HAVE ANY KIND OF SEXUAL ACTIVITY BY YOUR PARTNER OR EX-PARTNER?: NO
WITHIN THE LAST YEAR, HAVE YOU BEEN AFRAID OF YOUR PARTNER OR EX-PARTNER?: NO

## 2024-11-15 ASSESSMENT — COPD QUESTIONNAIRES
COPD SCREENING SCORE: 2
HAVE YOU SMOKED AT LEAST 100 CIGARETTES IN YOUR ENTIRE LIFE: NO/DON'T KNOW
DO YOU EVER COUGH UP ANY MUCUS OR PHLEGM?: NO/ONLY WITH OCCASIONAL COLDS OR INFECTIONS
DURING THE PAST 4 WEEKS HOW MUCH DID YOU FEEL SHORT OF BREATH: NONE/LITTLE OF THE TIME

## 2024-11-15 ASSESSMENT — FIBROSIS 4 INDEX
FIB4 SCORE: 1.51
FIB4 SCORE: 1.7

## 2024-11-15 ASSESSMENT — PAIN DESCRIPTION - PAIN TYPE
TYPE: ACUTE PAIN
TYPE: ACUTE PAIN;CHRONIC PAIN

## 2024-11-15 NOTE — ED NOTES
Called to room upon return from ct-wife requesting po tylenol for pt c/o leg cramps per home regime. Will update erp

## 2024-11-15 NOTE — ED PROVIDER NOTES
"ER Provider Note    Scribed for Maged Layton M.d. by Efren Jhaveri. 11/15/2024  2:01 PM    Primary Care Provider: Madison Bunch D.O.    CHIEF COMPLAINT   Chief Complaint   Patient presents with    ALOC     EXTERNAL RECORDS REVIEWED  Outpatient Notes The patient has a history of previous stroke. He saw his PCP in October of this year for diabetes.    HPI/ROS  LIMITATION TO HISTORY   Select: : None  OUTSIDE HISTORIAN(S):  EMS provided history regarding the patient's hypotension on scene and Significant other wife was present and gave nearly all history as shown below.    Av Bob is a 79 y.o. male with a history of stroke who presents to the ED via EMS complaining of an episode of aphasia onset prior to arrival. The patient's wife reports the patient was about to get in bed after eating, but he was unable to get out of his wheelchair and was speaking like \"his tongue was too big for his mouth\". She states the patient did not have any focal deficits at this time. He did have some incontinence during the episode, but he is incontinent at baseline. EMS reports the patient's blood pressure was 80/40 when they arrived on scene. She notes this is the 3rd of these episodes he has had this week, but the others were shorter than the one today. They have been lasting about 5 minutes prior to this one. Currently she states the patient appears to be at his baseline. He does have lasting left arm and leg deficits from a previous stroke. She does note he had abdominal pain, diarrhea, and an episode of vomiting last week after starting Trulicity. He stopped taking it and these symptoms went away. Patient denies any current headache, any other pain, or dysuria. His wife adds he sometimes is not oriented to time at baseline.     PAST MEDICAL HISTORY  Past Medical History:   Diagnosis Date    ([facility]) Chronic ulcer of right lower extremity with fat layer exposed 12/27/2018    Acute cystitis without " hematuria 6/30/2019    Acute deep vein thrombosis (DVT) of RLE and partial DVT of LLE 6/19/2022    US LE (6/2022):   1.  Acute appearing RIGHT posterior tibial vein DVT.  2.  Subacute appearing LEFT common femoral vein DVT and proximal femoral   vein junction DVT.   He had leg pain and ultrasound of bilateral lower extremities was completed which was positive for new DVT, acute in the right posterior tibial vein and subacute in the left common femoral and proximal femoral vein junction as noted    Acute on chronic renal failure (HCC) 1/21/2020    Acute prostatitis 1/13/2022    New problem of prostatitis identified by dispatch health when patient developed hematuria with rectal pain.  He followed up with his urology PA and was placed back on Bactrim.  He was recently on Bactrim and has a history of recurrent urinary tract infections related to neurogenic bladder from prior stroke.  Rectal pain is dissipated however he continues to have hematuria.  He had associated CATA o    Acute respiratory failure with hypoxia ([facility]) 5/20/2017    CAD (coronary artery disease)     GIOVANNA to RCA in '97, GIOVANNA X2 to LAD and GIOVANNA X2 to OM in '09    Cancer ([facility])     2017; chemo lympoma non hodgkins lymphoma    Cataract     dez iol    Cerebrovascular accident (CVA) ([facility]) 12/30/2016    Left arm weakness  etiology of stroke not established, lymphoma discovered on MRI evaluation of stroke, L hemiparesis much worse after acute infectious illness in mid 2017, but no specific diagnosed recurrent neurological etiology, all at [facility]    Chickenpox     CKD (chronic kidney disease) stage 3, GFR 30-59 ml/min     Controlled gout 2014    Coronary atherosclerosis of native coronary artery     S/P PTCA (percutaneous transluminal coronary angioplasty), RCA, 5/1997, patent on cath 7/10/2009 at the time of interventions on his left anterior descending and circumflex coronary arteries    Daytime sleepiness     Depression      Diabetes (HCC)     Diarrhea 7/2/2019    Difficulty swallowing     Dyschezia 9/22/2022    For the past 6 months or so he has been experiencing significant pain with defecation.  Does not happen every time.  His wife who is his primary caregiver notes there is no hard stool when he has a painful episodes.  He also is having stool incontinence sometimes when he coughs and other times he will pass a full bowel movement and not be aware.  He has not yet seen GI for this problem.    Dysphagia 3/15/2021    He reports progressive worsening of his swallow function.  He notices at least once per week he is choking and coughing, can be with pills or can be with food.  The episodes are quite frightening and he takes a bit of time for him to recover.  He has a prior history of stroke and recalls working with speech-language pathology at that time but does not remember if he did any formal esophagrams or s    Enterococcal septicemia ([facility]) 8/12/2017    Exposure to SARS-associated coronavirus 10/13/2021    He reports viral illness last week with runny nose, congestion, productive cough, and wheezing.  He went to a play of his grandson and may have been exposed to Covid.  He was feeling very bad last Friday and over the weekend and now is at least 50% better.    Roberto hematuria 1/29/2020    Frequent urination     GERD (gastroesophageal reflux disease)     [place] measles     History of renal calculi 11/19/2019    Hordeolum externum of left lower eyelid 9/14/2021    He reports to clinic with 3 weeks of erythema and pain of the left lower eyelid. No clear inciting cause. Reports no globe pain. Lower > upper eyelid swelling and erythema. After 1.5 weeks had drainage of purulence from the lower medial eyelid. No photosensitivity. Using warm compresses. No changes in visual acuity. Saw retinal specialist around day 1 or 2 of symptoms who felt it could be related     Hospital discharge follow-up 6/27/2022    Av was admitted to the  hospital from 5/28 to 5/31/2022 for weakness and sepsis related to urinary tract infection.  [facility] to try to improve mobility and ADLs due to prior stroke.  This was complicated by C. difficile infection as well as lower extremity DVTs.  Diarrhea resolved with oral vancomycin.  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 Barry    Normocytic hypochromic anemia 5/20/2017    NSTEMI (non-ST elevated myocardial infarction) ([facility]) 07/18/2017    complicating UTI with sepsis    Pain 06/30/2021    right leg foot    Peripheral vascular disease ([facility])     Polyneuropathy in diabetes(357.2) 9/11/2013    Pyelonephritis 5/24/2022    Av had abrupt onset of illness starting on Sunday.  He felt unwell and then had projectile vomiting x3 episodes after dinner that evening.  He had associated nausea but no overt abdominal pain.  He has continued to have anorexia and is having difficulty with his p.o. intake.  He did get in some fruit in a month and yesterday and about 50 to 60 ounces of water.  He has chronic urinary retention    Renal cyst 1/29/2020    Renal mass 1/21/2020    Septic shock ([facility]) 5/20/2017    Skin ulcer of calf ([facility]) 2015    Right, Dr. Terry and wound care    Stage 3b chronic kidney disease 8/25/2016     Latest Reference Range & Units 04/29/22 11:14 Bun 8 - 22 mg/dL 33 (H) Creatinine 0.50 - 1.40 mg/dL 1.55 (H) GFR (CKD-EPI) >60  mL/min/1.73 m 2 46 ! [1]  He has a history of chronic kidney disease related to nephrolithiasis, recurrent UTIs, and BPH as well as history of hypertension and diabetes.  He is following with nephrology, Dr. Jarvis and urology, Dr. Barry.  Spironolactone was discontinued due     Stroke (HCC) 2016    left sided weakness    Toenail avulsion, initial encounter- left foot 3rd digit 7/12/2021    They report that his toenail spontaneously came off last week.  He does not recall specific injury or any pain.  His significant other saw the nail on the floor with dried blood on his foot.  She cleaned the wound and covered it with gauze.  On follow-up in clinic today the gauze has stuck to the wound bed but with saline was fairly easy to remove the dried gauze.  There was scant amount of bright    Urinary bladder disorder     Urinary incontinence     pt wears pads    UTI (urinary tract infection) 5/28/2022    Weakness     Wound of left leg 2012    Requiring surgery and debridment, Dr. Moore       SURGICAL HISTORY  Past Surgical History:   Procedure Laterality Date    WA INJ LUMBAR/SACRAL,W/ IMAGING  7/27/2021    Procedure: Lumbar L5-S1 interlaminar epidural steroid injection;  Surgeon: Harpal Bennett M.D.;  Location: SUR[facility]ERY REHAB PAIN MANAGEMENT;  Service: Pain Management    WA UPPER GI ENDOSCOPY,DIAGNOSIS N/A 7/21/2021    Procedure: GASTROSCOPY - AND DILATION;  Surgeon: Neville Huerta M.D.;  Location: SURGERY SAME DAY [facility];  Service: Gastroenterology    WA UPPER GI ENDOSCOPY,BIOPSY N/A 7/21/2021    Procedure: GASTROSCOPY, WITH BIOPSY;  Surgeon: Neville Huerta M.D.;  Location: SURGERY SAME DAY [facility];  Service: Gastroenterology    LUMBAR TRANSFORAMINAL EPIDURAL STEROID INJECTION Right 3/29/2021    Procedure: RIGHT L3-4 and L4-5 transforaminal epidural steroid injection with fluoroscopic guidance;  Surgeon: Harpal Bennett M.D.;  Location: SURGERY REHAB PAIN MANAGEMENT;  Service: Pain Management    LUMBAR TRANSFORAMINAL  EPIDURAL STEROID INJECTION Right 11/2/2020    Procedure: INJECTION, STEROID, SPINE, LUMBAR, EPIDURAL, TRANSFORAMINAL APPROACH;  Surgeon: Harpal Bennett M.D.;  Location: SURGERY REHAB PAIN MANAGEMENT;  Service: Pain Management    CYSTOSCOPY STENT PLACEMENT Left 2/12/2018    Procedure: CYSTOSCOPY  ;  Surgeon: Rey Barry M.D.;  Location: SURGERY [facility];  Service: Urology    URETEROSCOPY Left 2/12/2018    Procedure: URETEROSCOPY- FLEXIBLE  ;  Surgeon: Rey Barry M.D.;  Location: SURGERY [facility];  Service: Urology    LASERTRIPSY Left 2/12/2018    Procedure: LASERTRIPSY - LITHO  ;  Surgeon: Rey Barry M.D.;  Location: SURGERY [facility];  Service: Urology    WOUND CLOSURE GENERAL  4/3/2012    Performed by GERHARD GILBERT at SURGERY SAME DAY [facility]    Z CARDIAC CATH  2009    Stents to LAD, Om    DAGOBERTO BY LAPAROSCOPY  1998    ANGIOPLASTY  1997    RCA followed by other stents as noted above.     [facility] CARDIAC CATH  1997    stent RCA    CATARACT EXTRACTION WITH IOL      bilateral    LITHOTRIPSY      TONSILLECTOMY      TONSILLECTOMY AND ADENOIDECTOMY         FAMILY HISTORY  Family History   Problem Relation Age of Onset    Heart Disease Father         CAD    Diabetes Father     Cancer Mother     Psychiatric Illness Mother         Depression    Depression Mother     Kidney stones Brother     Heart Disease Brother     Psychiatric Illness Brother         Depression    Depression Brother     Suicide Attempts Other     Psychiatric Illness Other         autism       SOCIAL HISTORY   reports that he has never smoked. He has never used smokeless tobacco. He reports that he does not drink alcohol and does not use drugs.    CURRENT MEDICATIONS  Previous Medications    ACETAMINOPHEN (TYLENOL) 325 MG TAB    Take 2 Tablets by mouth every 6 hours as needed for Mild Pain, Moderate Pain or Fever.    ALLOPURINOL (ZYLOPRIM) 100 MG TAB    Take 1 Tablet by mouth every evening.    AMLODIPINE (NORVASC) 10 MG  TAB    Take 1 Tablet by mouth every day.    ATORVASTATIN (LIPITOR) 80 MG TABLET    TAKE 1 TABLET BY MOUTH EVERY EVENING    BACLOFEN (LIORESAL) 5 MG TAB    Take 1 Tablet by mouth every evening for 180 days. And may consider 1/2 tablet by mouth as needed    CARVEDILOL (COREG) 6.25 MG TAB    Take 1 Tablet by mouth 2 times a day with meals.    CHOLECALCIFEROL (VITAMIN D) 2000 UNIT TAB    Take 2,000 Units by mouth every morning.    CLOPIDOGREL (PLAVIX) 75 MG TAB    TAKE 1 TABLET BY MOUTH EVERY DAY    CONTINUOUS GLUCOSE SENSOR (FREESTYLE [place]LO[place] 3 PLUS SENSOR) MISC    1 Each see administration instructions. [place] 3 CGM 15 day sensors    CONTINUOUS GLUCOSE SENSOR (FREESTYLE LLOYD 3 SENSOR) MISC    1 Each every 14 days.    DULAGLUTIDE 1.5 MG/0.5ML SOLUTION PEN-INJECTOR    Inject 1 Pen under the skin every 7 days.    FINASTERIDE (PROSCAR) 5 MG TAB    Take 5 mg by mouth every evening.    FLUOXETINE (PROZAC) 40 MG CAPSULE    TAKE 1 CAPSULE BY MOUTH EVERY DAY    GLUCOSE BLOOD (ONETOUCH ULTRA) STRIP    1 Strip by Other route 4 Times a Day,Before Meals and at Bedtime.    HUMALOG KWIKPEN 100 UNIT/ML SOLUTION PEN-INJECTOR INJECTION PEN    INJECT 5 UNITS UNDER SKIN 3 TIMES A DAY BEFORE MEALS. 5 UNITS FOR SUGARS= 101-150. INCREASE BY 2 UNITS IF>150. CORRECTION DOSAGE IS 2:50>150    INSULIN GLARGINE, 1 UNIT DIAL, (TOUJEO SOLOSTAR) 300 UNIT/ML SOLUTION PEN-INJECTOR    Inject 21-36 Units under the skin every day.    INSULIN PEN NEEDLE (PEN NEEDLES) 32G X 4 MM MISC    1 Each 5 Times a Day. USE FOR INSULIN SHOTS FIVE TIMES DAILY    MAGNESIUM OXIDE (MAG-OX) 400 MG TAB TABLET    Take 800 mg by mouth 2 times a day.    OLMESARTAN (BENICAR) 40 MG TAB    TAKE 1 TABLET BY MOUTH EVERY EVENING    OMEPRAZOLE (PRILOSEC) 20 MG DELAYED-RELEASE CAPSULE    Take 1 Capsule by mouth every day.    POTASSIUM CHLORIDE CR (MICRO-K) 8 MEQ CAP CR CAPSULE    TAKE 1 CAPSULE BY MOUTH EVERY 48 HOURS    PROBIOTIC PRODUCT (PROBIOTIC DAILY) CAP    Take  by mouth.  Floaster       ALLERGIES  Diphenhydramine, Lorazepam, Spironolactone, and Ciprofloxacin    PHYSICAL EXAM  BP (!) 161/85   Pulse 65   Temp 37 °C (98.6 °F) (Temporal)   Resp 18   Wt 87.1 kg (192 lb)   SpO2 96%   BMI 26.04 kg/m²   Constitutional: Well developed, Well nourished, mild distress.   HENT: Normocephalic, Atraumatic  Eyes: Conjunctiva normal, No discharge. Pupils are 3 mm and reactive. EOMI.  Cardiovascular: Normal heart rate, Normal rhythm, No murmurs, equal pulses.   Pulmonary: Normal breath sounds, No respiratory distress, No wheezing, No rales, No rhonchi.  Chest: No chest wall tenderness or deformity.   Abdomen:Soft, No tenderness, No masses, no rebound, no guarding.   Musculoskeletal: No major deformities noted, No tenderness.   Skin: Warm, Dry, No erythema, No rash.   Neurologic: Alert & oriented x 2, Left arm is in permanent contracture. Permanent left facial droop. No pronator drift of the right arm. 5/5 strength in the right upper and lower extremities.  Psychiatric: Affect normal, Judgment normal, Mood normal.      DIAGNOSTIC STUDIES    LABS  Results for orders placed or performed during the hospital encounter of 11/15/24   CBC WITH DIFFERENTIAL    Collection Time: 11/15/24  1:50 PM   Result Value Ref Range    WBC 10.2 4.8 - 10.8 K/uL    RBC 4.63 (L) 4.70 - 6.10 M/uL    Hemoglobin 13.2 (L) 14.0 - 18.0 g/dL    Hematocrit 40.5 (L) 42.0 - 52.0 %    MCV 87.5 81.4 - 97.8 fL    MCH 28.5 27.0 - 33.0 pg    MCHC 32.6 32.3 - 36.5 g/dL    RDW 44.1 35.9 - 50.0 fL    Platelet Count 234 164 - 446 K/uL    MPV 9.9 9.0 - 12.9 fL    Neutrophils-Polys 76.20 (H) 44.00 - 72.00 %    Lymphocytes 11.40 (L) 22.00 - 41.00 %    Monocytes 7.30 0.00 - 13.40 %    Eosinophils 3.10 0.00 - 6.90 %    Basophils 0.50 0.00 - 1.80 %    Immature Granulocytes 1.50 (H) 0.00 - 0.90 %    Nucleated RBC 0.00 0.00 - 0.20 /100 WBC    Neutrophils (Absolute) 7.74 (H) 1.82 - 7.42 K/uL    Lymphs (Absolute) 1.16 1.00 - 4.80 K/uL    Monos  (Absolute) 0.74 0.00 - 0.85 K/uL    Eos (Absolute) 0.31 0.00 - 0.51 K/uL    Baso (Absolute) 0.05 0.00 - 0.12 K/uL    Immature Granulocytes (abs) 0.15 (H) 0.00 - 0.11 K/uL    NRBC (Absolute) 0.00 K/uL   COMP METABOLIC PANEL    Collection Time: 11/15/24  1:50 PM   Result Value Ref Range    Sodium 135 135 - 145 mmol/L    Potassium 4.0 3.6 - 5.5 mmol/L    Chloride 105 96 - 112 mmol/L    Co2 20 20 - 33 mmol/L    Anion Gap 10.0 7.0 - 16.0    Glucose 179 (H) 65 - 99 mg/dL    Bun 22 8 - 22 mg/dL    Creatinine 1.15 0.50 - 1.40 mg/dL    Calcium 8.4 8.4 - 10.2 mg/dL    Correct Calcium 9.4 8.5 - 10.5 mg/dL    AST(SGOT) 20 12 - 45 U/L    ALT(SGPT) 19 2 - 50 U/L    Alkaline Phosphatase 160 (H) 30 - 99 U/L    Total Bilirubin 0.6 0.1 - 1.5 mg/dL    Albumin 2.8 (L) 3.2 - 4.9 g/dL    Total Protein 5.6 (L) 6.0 - 8.2 g/dL    Globulin 2.8 1.9 - 3.5 g/dL    A-G Ratio 1.0 g/dL   TROPONIN    Collection Time: 11/15/24  1:50 PM   Result Value Ref Range    Troponin T 30 (H) 6 - 19 ng/L   ESTIMATED GFR    Collection Time: 11/15/24  1:50 PM   Result Value Ref Range    GFR (CKD-EPI) 65 >60 mL/min/1.73 m 2   URINALYSIS (UA)    Collection Time: 11/15/24  2:33 PM    Specimen: Urine   Result Value Ref Range    Color Yellow     Character Cloudy (A)     Specific Gravity 1.020 <1.035    Ph 6.5 5.0 - 8.0    Glucose Negative Negative mg/dL    Ketones Negative Negative mg/dL    Protein 30 (A) Negative mg/dL    Bilirubin Negative Negative    Nitrite Negative Negative    Leukocyte Esterase Moderate (A) Negative    Occult Blood Moderate (A) Negative    Micro Urine Req Microscopic    URINE MICROSCOPIC (W/UA)    Collection Time: 11/15/24  2:33 PM   Result Value Ref Range    WBC Packed (A) /hpf    RBC 10-20 (A) /hpf    Bacteria Many (A) None /hpf    Epithelial Cells Few Few /hpf     *Note: Due to a large number of results and/or encounters for the requested time period, some results have not been displayed. A complete set of results can be found in Results  Review.        RADIOLOGY/PROCEDURES   The attending emergency physician has independently interpreted the diagnostic imaging associated with this visit and am waiting the final reading from the radiologist.   My preliminary interpretation is a follows: CT head shows no intracranial hemorrhage.    Radiologist interpretation:  CT-HEAD W/O   Final Result      1.  No evidence of acute intracranial process.      2.  Chronic ischemic change.      3.  Atrophy.                   COURSE & MEDICAL DECISION MAKING     ASSESSMENT, COURSE AND PLAN  Care Narrative:     2:08 PM - Patient seen and examined at bedside. This is a 79 year old man with a history of stroke who presents to the ED via EMS for evaluation of an episode of aphasia onset prior to arrival. This is his 3rd episode this week, and lasted longer than his previous ones. He did not have any focal deficits during the episode. Patient is now at baseline. Discussed plan of care, including labs to monitor his symptoms. Patient agrees to the plan of care. Ordered for CT-Head w/o, CBC w/ diff, CMP, troponin, and UA to evaluate his symptoms. Differential diagnoses include but not limited to: TIA, sepsis, urinary tract infection, requiescence        3:45 PM - The patient will be medicated with Tylenol 650 mg PO.   Discussed antibiotic choice with pharmacy.  They recommend Ancef.  Will be less inducing of C. difficile.  Discussed results with the patient and his wife at this point time I still am not sure if this is just required since from the urinary tract infection versus a TIA.  I think the patient would benefit from hospitalization with IV antibiotics and possible TIA workup.  Patient's wife also tells me that he is taken to chewing his medications including his blood pressure medications and was wondering if this could also be a cause for the low blood pressure        PROBLEM LIST  Patient presents with dysarthria and 3 separate episodes this week with some low blood  pressure on scene with a systolic blood pressure in the 80s.  By the time patient arrived here after some IV fluids and blood pressure was in the 140s.  And the symptoms had completely resolved.  Patient does have a urinary tract infection does not meet criteria for sepsis.  Will start him on Ancef.  At this point time I think patient would benefit from possible TIA workup as well.  Given the dysarthria episodes that he is had this week.    DISPOSITION AND DISCUSSIONS  I have discussed management of the patient with the following physicians and DMITRI's: Dr. Saleh    Discussion of management with other [facility] or appropriate source(s): Pharmacy about antibiotic choice        Barriers to care at this time, including but not limited to:  None .     Decision tools and prescription drugs considered including, but not limited to: Antibiotics Ancef .    DISPOSITION:  Patient will be hospitalized by Dr. Saleh in guarded condition.      FINAL DIAGNOSIS  1. Acute UTI    2. Altered mental status, unspecified altered mental status type    3. Dysarthria       Efren SANDOVAL (Scribe), am scribing for, and in the presence of, JIA Florentino*.    Electronically signed by: Efren Jhaveri (Scribe), 11/15/2024    Maged SANDOVAL M.* personally performed the services described in this documentation, as scribed by Efren Jhaveri in my presence, and it is both accurate and complete.      The note accurately reflects work and decisions made by me.  Maged Layton M.D.  11/15/2024  4:06 PM

## 2024-11-15 NOTE — ED TRIAGE NOTES
Pt bib EMS with c/o multiple transient episodes of ALOC. Per EMS the pt has had 3 separate episodes where be becomes confused per the spouse. Today they arrived and the pt's BP was 80/40 and he was AAOx2. Pt BP is now 160s/80

## 2024-11-15 NOTE — ED NOTES
Med Rec completed per patient's wife   Allergies reviewed  No ORAL antibiotics in last 30 days    Patient is not taking anticoagulants     Wife states that patient's Trulicity was discontinued

## 2024-11-15 NOTE — ED NOTES
Pt aao, old left sided weakness from previous cva- incontinent large amt of stool. Partial linen chg with liana care and brief chg along with zinc oxide ointment to liana area. Labs drawn, urine spec obtained and sent, [facility]

## 2024-11-16 ENCOUNTER — APPOINTMENT (OUTPATIENT)
Dept: RADIOLOGY | Facility: MEDICAL CENTER | Age: 79
DRG: 315 | End: 2024-11-16
Attending: HOSPITALIST
Payer: MEDICARE

## 2024-11-16 ENCOUNTER — APPOINTMENT (OUTPATIENT)
Dept: CARDIOLOGY | Facility: MEDICAL CENTER | Age: 79
DRG: 315 | End: 2024-11-16
Attending: HOSPITALIST
Payer: MEDICARE

## 2024-11-16 VITALS
SYSTOLIC BLOOD PRESSURE: 151 MMHG | TEMPERATURE: 96.5 F | RESPIRATION RATE: 18 BRPM | HEART RATE: 67 BPM | HEIGHT: 72 IN | DIASTOLIC BLOOD PRESSURE: 74 MMHG | BODY MASS INDEX: 27.02 KG/M2 | WEIGHT: 199.52 LBS | OXYGEN SATURATION: 97 %

## 2024-11-16 LAB
ANION GAP SERPL CALC-SCNC: 9 MMOL/L (ref 7–16)
BUN SERPL-MCNC: 23 MG/DL (ref 8–22)
CALCIUM SERPL-MCNC: 8 MG/DL (ref 8.4–10.2)
CHLORIDE SERPL-SCNC: 104 MMOL/L (ref 96–112)
CO2 SERPL-SCNC: 21 MMOL/L (ref 20–33)
CREAT SERPL-MCNC: 0.92 MG/DL (ref 0.5–1.4)
ERYTHROCYTE [DISTWIDTH] IN BLOOD BY AUTOMATED COUNT: 42.9 FL (ref 35.9–50)
GFR SERPLBLD CREATININE-BSD FMLA CKD-EPI: 85 ML/MIN/1.73 M 2
GLUCOSE BLD STRIP.AUTO-MCNC: 106 MG/DL (ref 65–99)
GLUCOSE BLD STRIP.AUTO-MCNC: 154 MG/DL (ref 65–99)
GLUCOSE SERPL-MCNC: 138 MG/DL (ref 65–99)
HCT VFR BLD AUTO: 40.1 % (ref 42–52)
HGB BLD-MCNC: 13.2 G/DL (ref 14–18)
LACTATE SERPL-SCNC: 1.4 MMOL/L (ref 0.5–2)
LACTATE SERPL-SCNC: 1.5 MMOL/L (ref 0.5–2)
LV EJECT FRACT  99904: 55
MCH RBC QN AUTO: 28.2 PG (ref 27–33)
MCHC RBC AUTO-ENTMCNC: 32.9 G/DL (ref 32.3–36.5)
MCV RBC AUTO: 85.7 FL (ref 81.4–97.8)
PLATELET # BLD AUTO: 244 K/UL (ref 164–446)
PMV BLD AUTO: 9.7 FL (ref 9–12.9)
POTASSIUM SERPL-SCNC: 4.2 MMOL/L (ref 3.6–5.5)
RBC # BLD AUTO: 4.68 M/UL (ref 4.7–6.1)
SODIUM SERPL-SCNC: 134 MMOL/L (ref 135–145)
WBC # BLD AUTO: 10.5 K/UL (ref 4.8–10.8)

## 2024-11-16 PROCEDURE — 700102 HCHG RX REV CODE 250 W/ 637 OVERRIDE(OP): Performed by: HOSPITALIST

## 2024-11-16 PROCEDURE — 93306 TTE W/DOPPLER COMPLETE: CPT

## 2024-11-16 PROCEDURE — 99239 HOSP IP/OBS DSCHRG MGMT >30: CPT | Performed by: STUDENT IN AN ORGANIZED HEALTH CARE EDUCATION/TRAINING PROGRAM

## 2024-11-16 PROCEDURE — 700105 HCHG RX REV CODE 258: Performed by: HOSPITALIST

## 2024-11-16 PROCEDURE — 36415 COLL VENOUS BLD VENIPUNCTURE: CPT

## 2024-11-16 PROCEDURE — 51798 US URINE CAPACITY MEASURE: CPT

## 2024-11-16 PROCEDURE — 97167 OT EVAL HIGH COMPLEX 60 MIN: CPT

## 2024-11-16 PROCEDURE — 97535 SELF CARE MNGMENT TRAINING: CPT

## 2024-11-16 PROCEDURE — 94760 N-INVAS EAR/PLS OXIMETRY 1: CPT

## 2024-11-16 PROCEDURE — 85027 COMPLETE CBC AUTOMATED: CPT

## 2024-11-16 PROCEDURE — A9270 NON-COVERED ITEM OR SERVICE: HCPCS | Performed by: HOSPITALIST

## 2024-11-16 PROCEDURE — 82962 GLUCOSE BLOOD TEST: CPT

## 2024-11-16 PROCEDURE — 80048 BASIC METABOLIC PNL TOTAL CA: CPT

## 2024-11-16 PROCEDURE — 70551 MRI BRAIN STEM W/O DYE: CPT

## 2024-11-16 PROCEDURE — 83605 ASSAY OF LACTIC ACID: CPT | Mod: 91

## 2024-11-16 PROCEDURE — 93306 TTE W/DOPPLER COMPLETE: CPT | Mod: 26 | Performed by: INTERNAL MEDICINE

## 2024-11-16 RX ADMIN — SODIUM CHLORIDE: 9 INJECTION, SOLUTION INTRAVENOUS at 04:29

## 2024-11-16 RX ADMIN — POTASSIUM CHLORIDE 10 MEQ: 1500 TABLET, EXTENDED RELEASE ORAL at 06:10

## 2024-11-16 RX ADMIN — FLUOXETINE HYDROCHLORIDE 40 MG: 20 CAPSULE ORAL at 06:09

## 2024-11-16 RX ADMIN — OMEPRAZOLE 20 MG: 20 CAPSULE, DELAYED RELEASE ORAL at 06:10

## 2024-11-16 RX ADMIN — OXYCODONE 5 MG: 5 TABLET ORAL at 03:44

## 2024-11-16 RX ADMIN — CARVEDILOL 6.25 MG: 6.25 TABLET, FILM COATED ORAL at 08:11

## 2024-11-16 RX ADMIN — INSULIN LISPRO 2 UNITS: 100 INJECTION, SOLUTION INTRAVENOUS; SUBCUTANEOUS at 12:22

## 2024-11-16 RX ADMIN — ACETAMINOPHEN 650 MG: 325 TABLET ORAL at 02:44

## 2024-11-16 RX ADMIN — Medication 2000 UNITS: at 06:10

## 2024-11-16 ASSESSMENT — ACTIVITIES OF DAILY LIVING (ADL): TOILETING: DEPENDENT

## 2024-11-16 ASSESSMENT — FIBROSIS 4 INDEX: FIB4 SCORE: 1.45

## 2024-11-16 ASSESSMENT — COGNITIVE AND FUNCTIONAL STATUS - GENERAL
EATING MEALS: A LITTLE
HELP NEEDED FOR BATHING: TOTAL
DRESSING REGULAR LOWER BODY CLOTHING: TOTAL
PERSONAL GROOMING: A LITTLE
SUGGESTED CMS G CODE MODIFIER DAILY ACTIVITY: CL
DRESSING REGULAR UPPER BODY CLOTHING: A LOT
TOILETING: TOTAL
DAILY ACTIVITIY SCORE: 11

## 2024-11-16 ASSESSMENT — PAIN DESCRIPTION - PAIN TYPE
TYPE: ACUTE PAIN

## 2024-11-16 NOTE — H&P
Hospital Medicine History & Physical Note    Date of Service  11/15/2024    Primary Care Physician  Madison Bunch D.O.    Consultants  None    Specialist Names: None    Code Status  Full Code    Chief Complaint  Chief Complaint   Patient presents with    ALOC       History of Presenting Illness  Av Bob is a 79 y.o. male who presented 11/15/2024 with altered mental status.  According to the wife the patient 3 times this week has had garbled speech.  The patient has had a stroke back in 2016 and has left-sided hemiplegia.  She called paramedics today because he was not behaving like himself.  In the field he was hypotensive and he was brought into the emergency room.  Upon evaluation in the emergency room patient's blood pressure was normal.  The patient's condition at this point seems to be at his baseline.  He does appear to have an infected urine however he is asymptomatic and his urologist has told him and his wife not to treated if he is asymptomatic.  In the past every time he was placed on antibiotic he developed C. difficile colitis and so they do not want antibiotics unless by all means is necessary.  The patient at this point to rule out TIA will have an MRI of his head as well as an echocardiogram and we will have PT OT evaluate him as well.  For now we will hold off on antibiotics since he is asymptomatic and the family so has requested..    I discussed the plan of care with patient, family, bedside RN, pharmacy, and emergency room physician Gideon Layton .    Review of Systems  Review of Systems   Unable to perform ROS: Acuity of condition       Past Medical History   has a past medical history of (HCC) Chronic ulcer of right lower extremity with fat layer exposed (12/27/2018), Acute cystitis without hematuria (6/30/2019), Acute deep vein thrombosis (DVT) of RLE and partial DVT of LLE (6/19/2022), Acute on chronic renal failure (HCC) (1/21/2020), Acute prostatitis  (1/13/2022), Acute respiratory failure with hypoxia ([facility]) (5/20/2017), CAD (coronary artery disease), Cancer ([facility]), Cataract, Cerebrovascular accident (CVA) ([facility]) (12/30/2016), Chickenpox, CKD (chronic kidney disease) stage 3, GFR 30-59 ml/min, Controlled gout (2014), Coronary atherosclerosis of native coronary artery, Daytime sleepiness, Depression, Diabetes ([facility]), Diarrhea (7/2/2019), Difficulty swallowing, Dyschezia (9/22/2022), Dysphagia (3/15/2021), Enterococcal septicemia ([facility]) (8/12/2017), Exposure to SARS-associated coronavirus (10/13/2021), Roberto hematuria (1/29/2020), Frequent urination, GERD (gastroesophageal reflux disease), [place] measles, History of renal calculi (11/19/2019), Hordeolum externum of left lower eyelid (9/14/2021), Hospital discharge follow-up (6/27/2022), Hydronephrosis (1/20/2020), Hypertension (06/30/2021), Hypokalemia (2012), Hypomagnesemia (08/12/2017), Influenza A (1/26/2020), Insomnia, Kidney stone, Left hand weakness (5/20/2019), Leg edema, right (1/22/2020), Lymphoma ([facility]) (2/19/2017), Mixed hyperlipidemia, Muscle strain of right gluteal region (5/20/2019), Nephrolithiasis (2006), Normocytic hypochromic anemia (5/20/2017), NSTEMI (non-ST elevated myocardial infarction) ([facility]) (07/18/2017), Pain (06/30/2021), Peripheral vascular disease ([facility]), Polyneuropathy in diabetes(357.2) (9/11/2013), Pyelonephritis (5/24/2022), Renal cyst (1/29/2020), Renal mass (1/21/2020), Septic shock ([facility]) (5/20/2017), Skin ulcer of calf ([facility]) (2015), Stage 3b chronic kidney disease (8/25/2016), Stroke ([facility]) (2016), Toenail avulsion, initial encounter- left foot 3rd digit (7/12/2021), Urinary bladder disorder, Urinary incontinence, UTI (urinary tract infection) (5/28/2022), Weakness, and Wound of left leg (2012).    Surgical History   has a past surgical history that includes lithotripsy; angioplasty (1997); wound closure general (4/3/2012); tonsillectomy and adenoidectomy; cataract extraction with iol;  solo by laparoscopy (); cystoscopy stent placement (Left, 2018); ureteroscopy (Left, 2018); lasertripsy (Left, 2018); [facility] [place] cardiac cath (); [facility] [place] cardiac cath (); tonsillectomy; lumbar transforaminal epidural steroid injection (Right, 2020); lumbar transforaminal epidural steroid injection (Right, 3/29/2021); pr upper gi endoscopy,diagnosis (N/A, 2021); pr upper gi endoscopy,biopsy (N/A, 2021); and pr inj lumbar/sacral,w/ imaging (2021).     Family History  family history includes Cancer in his mother; Depression in his brother and mother; Diabetes in his father; Heart Disease in his brother and father; Kidney stones in his brother; Psychiatric Illness in his brother, mother, and another family member; Suicide Attempts in an other family member.   Family history reviewed with patient. There is no family history that is pertinent to the chief complaint.     Social History   reports that he has never smoked. He has never used smokeless tobacco. He reports that he does not drink alcohol and does not use drugs.    Allergies  Allergies   Allergen Reactions    Diphenhydramine Anxiety     Pt is able to tolerate  Mg benadryl with less anxiety    Lorazepam Unspecified     Disorientation    Spironolactone Unspecified     Acute kidney injury    Ciprofloxacin Rash     Rash,stomach ache       Medications  Prior to Admission Medications   Prescriptions Last Dose Informant Patient Reported? Taking?   Cholecalciferol (VITAMIN D) 2000 UNIT Tab 11/15/2024 Morning Significant Other Yes Yes   Sig: Take 2,000 Units by mouth every morning.   Continuous Glucose Sensor (FREESTYLE LLOYD 3 PLUS SENSOR) Misc  Significant Other No No   Si Each see administration instructions. [place] 3 CGM 15 day sensors   Continuous Glucose Sensor (FREESTYLE LLOYD 3 SENSOR) Misc  Significant Other No No   Si Each every 14 days.   Dulaglutide 1.5 MG/0.5ML Solution Pen-injector 2024 Significant Other  No No   Sig: Inject 1 Pen under the skin every 7 days.   HUMALOG KWIKPEN 100 UNIT/ML Solution Pen-injector injection PEN 11/15/2024 Morning Significant Other No Yes   Sig: INJECT 5 UNITS UNDER SKIN 3 TIMES A DAY BEFORE MEALS. 5 UNITS FOR SUGARS= 101-150. INCREASE BY 2 UNITS IF>150. CORRECTION DOSAGE IS 2:50>150   Patient taking differently: Inject 5-7 Units under the skin 3 times a day before meals. INJECT 5 UNITS UNDER SKIN 3 TIMES A DAY BEFORE MEALS. 5 UNITS FOR SUGARS= 101-150. INCREASE BY 2 UNITS IF>150. CORRECTION DOSAGE IS 2:50>150   Insulin Glargine, 1 Unit Dial, (TOUJEO SOLOSTAR) 300 UNIT/ML Solution Pen-injector 2024 Evening Significant Other No Yes   Sig: Inject 21-36 Units under the skin every day.   Patient taking differently: Inject 5-17 Units under the skin every day.   Insulin Pen Needle (PEN NEEDLES) 32G X 4 MM Misc  Significant Other No No   Si Each 5 Times a Day. USE FOR INSULIN SHOTS FIVE TIMES DAILY   Potassium Chloride CR (MICRO-K) 8 MEQ Cap CR capsule 2024 Morning Significant Other No Yes   Sig: TAKE 1 CAPSULE BY MOUTH EVERY 48 HOURS   Probiotic Product (PROBIOTIC DAILY) Cap 11/15/2024 Morning Significant Other Yes Yes   Sig: Take 1 Capsule by mouth in the morning, at noon, and at bedtime.   acetaminophen (TYLENOL) 500 MG Tab 11/15/2024 at  7:00 AM Significant Other Yes Yes   Sig: Take 500-1,000 mg by mouth every 6 hours as needed. Indications: Pain   allopurinol (ZYLOPRIM) 100 MG Tab 2024 Evening Significant Other No Yes   Sig: Take 1 Tablet by mouth every evening.   amLODIPine (NORVASC) 10 MG Tab 11/15/2024 Morning Significant Other No Yes   Sig: Take 1 Tablet by mouth every day.   atorvastatin (LIPITOR) 80 MG tablet 2024 Evening Significant Other No Yes   Sig: TAKE 1 TABLET BY MOUTH EVERY EVENING   baclofen (LIORESAL) 5 MG Tab 2024 Evening Significant Other No Yes   Sig: Take 1 Tablet by mouth every evening for 180 days. And may consider 1/2 tablet by mouth  as needed   carvedilol (COREG) 6.25 MG Tab 11/15/2024 Morning Significant Other No Yes   Sig: Take 1 Tablet by mouth 2 times a day with meals.   clopidogrel (PLAVIX) 75 MG Tab 2024 Evening Significant Other No Yes   Sig: TAKE 1 TABLET BY MOUTH EVERY DAY   finasteride (PROSCAR) 5 MG Tab 2024 Evening Significant Other Yes Yes   Sig: Take 5 mg by mouth every evening.   fluoxetine (PROZAC) 40 MG capsule 11/15/2024 Morning Significant Other No Yes   Sig: TAKE 1 CAPSULE BY MOUTH EVERY DAY   glucose blood (ONETOUCH ULTRA) strip  Significant Other No No   Si Strip by Other route 4 Times a Day,Before Meals and at Bedtime.   magnesium oxide (MAG-OX) 400 MG Tab tablet 11/15/2024 Morning Significant Other Yes Yes   Sig: Take 800 mg by mouth 2 times a day.   olmesartan (BENICAR) 40 MG Tab 2024 Evening Significant Other No Yes   Sig: TAKE 1 TABLET BY MOUTH EVERY EVENING   omeprazole (PRILOSEC) 20 MG delayed-release capsule 11/15/2024 Morning Significant Other No Yes   Sig: Take 1 Capsule by mouth every day.      Facility-Administered Medications: None       Physical Exam  Temp:  [37 °C (98.6 °F)] 37 °C (98.6 °F)  Pulse:  [65-70] 66  Resp:  [18] 18  BP: (161-164)/(85-89) 164/89  SpO2:  [96 %-97 %] 97 %  Blood Pressure : (!) 164/89   Temperature: 37 °C (98.6 °F)   Pulse: 66   Respiration: 18   Pulse Oximetry: 97 %       Physical Exam  Vitals and nursing note reviewed. Exam conducted with a chaperone present.   Constitutional:       General: He is not in acute distress.     Appearance: Normal appearance. He is well-developed and normal weight. He is ill-appearing. He is not toxic-appearing or diaphoretic.   HENT:      Head: Normocephalic and atraumatic.      Right Ear: External ear normal.      Left Ear: External ear normal.      Nose: Nose normal.      Mouth/Throat:      Mouth: Mucous membranes are moist.      Pharynx: Oropharynx is clear.   Eyes:      General:         Right eye: No discharge.         Left  eye: No discharge.      Extraocular Movements: Extraocular movements intact.      Conjunctiva/sclera: Conjunctivae normal.      Pupils: Pupils are equal, round, and reactive to light.   Neck:      Thyroid: No thyromegaly.      Vascular: No carotid bruit or JVD.   Cardiovascular:      Rate and Rhythm: Normal rate and regular rhythm.      Pulses: Normal pulses.      Heart sounds: Murmur heard.   Pulmonary:      Effort: Pulmonary effort is normal.      Breath sounds: Normal breath sounds. No rales.   Chest:      Chest wall: No tenderness.   Abdominal:      General: Abdomen is flat. Bowel sounds are normal. There is no distension.      Palpations: Abdomen is soft. There is no mass.      Tenderness: There is no abdominal tenderness. There is no guarding or rebound.   Musculoskeletal:         General: Normal range of motion.      Cervical back: Normal range of motion and neck supple.      Right lower leg: Edema present.      Left lower leg: Edema present.   Lymphadenopathy:      Cervical: No cervical adenopathy.   Skin:     General: Skin is warm and dry.      Capillary Refill: Capillary refill takes more than 3 seconds.      Findings: No lesion or rash.   Neurological:      General: No focal deficit present.      Mental Status: He is alert and oriented to person, place, and time. Mental status is at baseline.      GCS: GCS eye subscore is 4. GCS verbal subscore is 5. GCS motor subscore is 6.      Cranial Nerves: No cranial nerve deficit.      Sensory: No sensory deficit.      Deep Tendon Reflexes: Reflexes are normal and symmetric.   Psychiatric:         Attention and Perception: He is inattentive.         Mood and Affect: Mood normal. Affect is flat.         Speech: Speech is delayed and slurred.         Behavior: Behavior is slowed.         Thought Content: Thought content normal.         Cognition and Memory: Cognition is impaired. Memory is impaired. He exhibits impaired recent memory and impaired remote memory.        "  Judgment: Judgment normal. Judgment is not impulsive or inappropriate.         Laboratory:  Recent Labs     11/15/24  1350   WBC 10.2   RBC 4.63*   HEMOGLOBIN 13.2*   HEMATOCRIT 40.5*   MCV 87.5   MCH 28.5   MCHC 32.6   RDW 44.1   PLATELETCT 234   MPV 9.9     Recent Labs     11/15/24  1350   SODIUM 135   POTASSIUM 4.0   CHLORIDE 105   CO2 20   GLUCOSE 179*   BUN 22   CREATININE 1.15   CALCIUM 8.4     Recent Labs     11/15/24  1350   ALTSGPT 19   ASTSGOT 20   ALKPHOSPHAT 160*   TBILIRUBIN 0.6   GLUCOSE 179*         No results for input(s): \"NTPROBNP\" in the last 72 hours.      Recent Labs     11/15/24  1350   TROPONINT 30*       Imaging:  CT-HEAD W/O   Final Result      1.  No evidence of acute intracranial process.      2.  Chronic ischemic change.      3.  Atrophy.               MR-BRAIN-W/O    (Results Pending)   EC-ECHOCARDIOGRAM COMPLETE W/O CONT    (Results Pending)       X-Ray:  I have personally reviewed the images and compared with prior images.  EKG:  I have personally reviewed the images and compared with prior images.    Assessment/Plan:  Justification for Admission Status  I anticipate this patient is appropriate for observation status at this time because patient has possible TIA versus CVA will require at least 24 hours of inpatient management for evaluation    Patient will need a Telemetry bed on MEDICAL service .  The need is secondary to TIA with slurred speech that is coming and going.    * Hypotension arterial- (present on admission)  Assessment & Plan  Initially had hypotensive episode in the field from paramedics.  Here in the hospital the patient has not been hypotensive at all  The patient's wife believes that the reason he was hypotensive as he took all his blood pressure medications at home together and he chewed them up.  Monitor blood pressure  Give fluid resuscitation  Space out antihypertensive management    Acute cystitis without hematuria- (present on admission)  Assessment & " Plan  Asymptomatic  Patient's urinalysis shows protein 30, leukocyte esterase moderate, urine is packed with white blood cells, and has many bacteria.  Patient usually has colonization and urology has stated that the patient should not be treated unless the patient is symptomatic.  In the past every time patient was treated he developed C. difficile colitis and so at this point there wife would like us to hold off on antibiotics as she is afraid he will get C. difficile colitis and he has been asymptomatic with his bladder infection.  Discussed that with pharmacy as well at this point we will hold off on antibiotics as the patient is asymptomatic, white blood cell count is only 10.2, patient is afebrile, patient is normotensive at this point and his electrolytes are normal as well.  He is not acidotic with a CO2 level of 20.    (HCC) Left hemiparesis- (present on admission)  Assessment & Plan  Patient has chronic left-sided hemiparesis from a stroke back in 2016  Patient over the past week has had 3 episodes where his speech became more garbled and the wife was very concerned about a CVA.  Patient will be evaluated an MRI of the head and an echocardiogram and we will have PT OT evaluate him as well.    Paroxysmal atrial fibrillation (HCC)- (present on admission)  Assessment & Plan  Patient is on Coreg 6.25 mg twice daily for rate control and initially I was going to hold this with history of hypotension in the field.  Patient currently is with a systolic blood pressure 158 and thus at this point he will be able to resume his Coreg.  Patient is not on chronic anticoagulation  Patient is on Plavix.    Type 2 diabetes mellitus with stage 3b chronic kidney disease, with long-term current use of insulin (HCC)- (present on admission)  Assessment & Plan  -accus with sliding scale coverage, long-acting insulin with Lantus  -diabetic diet  -diabetic education  -follow glycohemoglobin levels long term, most recent hemoglobin  A1c 7.7  -continue with oral antihyperglycemics  -monitor for hypoglycemic episodes and adjust control if he should get low-accus with sliding scale coverage      Gout- (present on admission)  Assessment & Plan  Allopurinol 100 mg daily    Vitamin D insufficiency- (present on admission)  Assessment & Plan  Vitamin D supplementation    ASHLEY (obstructive sleep apnea)- (present on admission)  Assessment & Plan  BiPAP at night    Benign prostatic hyperplasia with urinary obstruction- (present on admission)  Assessment & Plan  Continue with Proscar 5 mg nightly    GERD with esophagitis- (present on admission)  Assessment & Plan  PPI therapy with omeprazole 20 mg daily    ([facility]) Hyperlipidemia associated with type 2 diabetes mellitus- (present on admission)  Assessment & Plan  Low-fat low-cholesterol diet  Continue with statin  Fasting lipid panel        VTE prophylaxis: SCDs/TEDs

## 2024-11-16 NOTE — PROGRESS NOTES
Telemetry Shift Summary     Rhythm: 65-78  HR: SR  Ectopy: Rare PVCs    Measurements: 0.18/0.10/0.42    Normal Values  Rhythm: SR  HR:   Measurements: 0.12-0.20/0.08-0.10/0.30-0.52

## 2024-11-16 NOTE — THERAPY
Physical Therapy Contact Note    Patient Name: Av Bob  Age:  79 y.o., Sex:  male  Medical Record #: [id number]  Today's Date: 11/16/2024 11/16/24 1115   Interdisciplinary Plan of Care Collaboration   IDT Collaboration with  Occupational Therapist   Collaboration Comments Order received and chart reviewed - spoke with OT.  Spouse declines acute PT, or further OT services acutely as pt is at baseline for mobility, he is assisted with all mobility needs at baseline, owns needed DME - has spouse and family support, 2x/week a therapist comes to the home.  Order cancelled for PT acutely - please re-order if change in status/family request.   Session Information   Date / Session Number  11/16 - DC PT per spouse request

## 2024-11-16 NOTE — ASSESSMENT & PLAN NOTE
Patient is on Coreg 6.25 mg twice daily for rate control and initially I was going to hold this with history of hypotension in the field.  Patient currently is with a systolic blood pressure 158 and thus at this point he will be able to resume his Coreg.  Patient is not on chronic anticoagulation  Patient is on Plavix.

## 2024-11-16 NOTE — PROGRESS NOTES
Attempted to call Usha, patient's wife, to complete MRI screening form, no answer at this time. Left voicemail with callback number.

## 2024-11-16 NOTE — CARE PLAN
The patient is Stable - Low risk of patient condition declining or worsening    Shift Goals  Clinical Goals: Echo, MRI, Q2T, maintain skin integrity  Patient Goals: Comfort, rest  Family Goals: BAN    Progress made toward(s) clinical / shift goals:    Problem: Knowledge Deficit - Standard  Goal: Patient and family/care givers will demonstrate understanding of plan of care, disease process/condition, diagnostic tests and medications  Description: Target End Date:  1-3 days or as soon as patient condition allows    Document in Patient Education    1.  Patient and family/caregiver oriented to unit, equipment, visitation policy and means for communicating concern  2.  Complete/review Learning Assessment  3.  Assess knowledge level of disease process/condition, treatment plan, diagnostic tests and medications  4.  Explain disease process/condition, treatment plan, diagnostic tests and medications  Outcome: Progressing     Problem: Skin Integrity  Goal: Skin integrity is maintained or improved  Description: Target End Date:  Prior to discharge or change in level of care    Document interventions on Skin Risk/Andrea flowsheet groups and corresponding LDA    1.  Assess and monitor skin integrity, appearance and/or temperature  2.  Assess risk factors for impaired skin integrity and/or pressures ulcers  3.  Implement precautions to protect skin integrity in collaboration with interdisciplinary team  4.  Implement pressure ulcer prevention protocol if at risk for skin breakdown  5.  Confirm wound care consult if at risk for skin breakdown  6.  Ensure patient use of pressure relieving devices  (Low air loss bed, waffle overlay, heel protectors, ROHO cushion, etc)  Outcome: Progressing  Note: Q2 turns, waffle mattress     Problem: Fall Risk  Goal: Patient will remain free from falls  Description: Target End Date:  Prior to discharge or change in level of care    Document interventions on the Camilla Gruber Fall Risk  Assessment    1.  Assess for fall risk factors  2.  Implement fall precautions  Outcome: Progressing  Note: High fall precautions in place     Problem: Pain - Standard  Goal: Alleviation of pain or a reduction in pain to the patient’s comfort goal  Description: Target End Date:  Prior to discharge or change in level of care    Document on Vitals flowsheet    1.  Document pain using the appropriate pain scale per order or unit policy  2.  Educate and implement non-pharmacologic comfort measures (i.e. relaxation, distraction, massage, cold/heat therapy, etc.)  3.  Pain management medications as ordered  4.  Reassess pain after pain med administration per policy  5.  If opiods administered assess patient's response to pain medication is appropriate per POSS sedation scale  6.  Follow pain management plan developed in collaboration with patient and interdisciplinary team (including palliative care or pain specialists if applicable)  Outcome: Progressing  Note: Currently declines pain, RN to reassess       Patient is not progressing towards the following goals:

## 2024-11-16 NOTE — CARE PLAN
The patient is Stable - Low risk of patient condition declining or worsening    Shift Goals  Clinical Goals: Monitor mentation and labs, safety  Patient Goals: Comfort  Family Goals: Wellness    Progress made toward(s) clinical / shift goals:    Problem: Knowledge Deficit - Standard  Goal: Patient and family/care givers will demonstrate understanding of plan of care, disease process/condition, diagnostic tests and medications  Description: Target End Date:  1-3 days or as soon as patient condition allows    Document in Patient Education    1.  Patient and family/caregiver oriented to unit, equipment, visitation policy and means for communicating concern  2.  Complete/review Learning Assessment  3.  Assess knowledge level of disease process/condition, treatment plan, diagnostic tests and medications  4.  Explain disease process/condition, treatment plan, diagnostic tests and medications  Outcome: Progressing     Problem: Skin Integrity  Goal: Skin integrity is maintained or improved  Description: Target End Date:  Prior to discharge or change in level of care    Document interventions on Skin Risk/Andrea flowsheet groups and corresponding LDA    1.  Assess and monitor skin integrity, appearance and/or temperature  2.  Assess risk factors for impaired skin integrity and/or pressures ulcers  3.  Implement precautions to protect skin integrity in collaboration with interdisciplinary team  4.  Implement pressure ulcer prevention protocol if at risk for skin breakdown  5.  Confirm wound care consult if at risk for skin breakdown  6.  Ensure patient use of pressure relieving devices  (Low air loss bed, waffle overlay, heel protectors, ROHO cushion, etc)  Outcome: Progressing     Problem: Fall Risk  Goal: Patient will remain free from falls  Description: Target End Date:  Prior to discharge or change in level of care    Document interventions on the Camilla Gruber Fall Risk Assessment    1.  Assess for fall risk factors  2.   Implement fall precautions  Outcome: Progressing  Note: High fall precautions in place       Patient is not progressing towards the following goals:

## 2024-11-16 NOTE — DISCHARGE SUMMARY
Discharge Summary    CHIEF COMPLAINT ON ADMISSION  Chief Complaint   Patient presents with    ALOC       Reason for Admission  EMS     Admission Date  11/15/2024    CODE STATUS  Full Code    HPI & HOSPITAL COURSE  Av Bob is a very pleasant 79-year-old man with history of CVA in 2016 with resultant left-sided hemiplegia.  He was brought into the ED on 11/15 after his wife felt he was altered.  She perceived that he had some garbled speech and was not behaving like himself.  He was apparently hypotensive in the field however here in the hospital he has been normotensive.  He was admitted for further workup, MRI of the brain did not show any acute changes and echocardiogram was unchanged since previous study and LVEF 55%.  On my evaluation he was feeling at his baseline, was not quite sure exactly what his wife was worried about to bring him in however he felt well and at his baseline and was eager for discharge home.    Therefore, he is discharged in fair and stable condition to home with close outpatient follow-up.    The patient recovered much more quickly than anticipated on admission.    Discharge Date  11/16/2024    FOLLOW UP ITEMS POST DISCHARGE  Follow-up with PCP, neurology    DISCHARGE DIAGNOSES  Principal Problem:    Hypotension arterial (POA: Yes)  Active Problems:    (HCC) Hyperlipidemia associated with type 2 diabetes mellitus (POA: Yes)      Overview:  Latest Reference Range & Units 05/13/23 11:09       Cholesterol,Tot 100 - 199 mg/dL 141       Triglycerides 0 - 149 mg/dL 131       HDL >=40 mg/dL 33 !       LDL <100 mg/dL 82                   History of stroke, CAD, DM2, and  hyperlipidemia. Currently on high dose       atorvastatin 80 mg daily and fenofibrate 145 mg daily. Follows with       cardiology, Dr. Tucker. Interested in considering adding PCSK9 inhibitor       therapy in place of statin/fibrate as LDL not at goal.            Zetia have been added to medication regiment however  he developed profound       fatigue and confusional state which resolved after he stopped the Zetia.        We will monitor without this medicine moving forward.    Type 2 diabetes mellitus with stage 3b chronic kidney disease, with long-term current use of insulin (HCC) (POA: Yes)      Overview:  Latest Reference Range & Units 02/27/24 15:52       Glycohemoglobin 5.8 % 7.3 !             Longstanding history of type 2 diabetes on insulin.  Previously followed       with endocrinology, Dr. Rasheed.             Current regimen includes Toujeo 18-25 units daily, Humalog 5 units for       sugars between 101-150, increase by 2 units if greater than 150.        Correction dosage is 2: 50 greater than 150.            Holding Trulicity since most recent hospitalization in June 2022, have not       resumed.             Complicated by retinal involvement, neuropathy, CKDIII-IV, and       microalbuminuria.    Paroxysmal atrial fibrillation (HCC) (POA: Yes)      Overview: At the time of his stroke and new non-Hodgkin's lymphoma cancer diagnosis       with brain mets and complicated by septic shock related to norovirus while       on chemotherapy, he also was found to have an episode of atrial       fibrillation on telemetry.              He reports that this diagnosis has continued to follow him around.  He is       on rate control medication.             He wore a heart monitor which demonstrated paroxysmal SVT but no atrial       fibrillation was noted.  Patient denies any issues with palpitations.  He       is not on any antiarrhythmics and no subsequent episodes of atrial       fibrillation have been captured.            He follows with Dr. Tucker of cardiology.            Current regimen: carvedilol 6.25 mg twice daily      Previous regimen: Metoprolol succinate 100-200 mg daily    (HCC) Left hemiparesis (POA: Yes)      Overview: Acute 12/2016 at [facility].  Found to have small likely embolic       CVA as well as  multiple enhancing masses consistent with cerebral       lymphoma.  Symptoms originally improved significantly with chemotherapy       and he was walking well, and then hemiparesis worsened significantly after       sepsis from UTI and norovirus infection mid 2017.        Previously could walk short distance with a cane or walker until medical       decompensation in 2022, has not been able to regain strength in his legs       since that time.     GERD with esophagitis (POA: Yes)    Acute cystitis without hematuria (POA: Yes)    Benign prostatic hyperplasia with urinary obstruction (POA: Yes)      Overview: S/p TURP in March 2022. Straight cath at home 1-2 times daily, follows       with Dr. Barry of Urology.     ASHLEY (obstructive sleep apnea) (POA: Yes)      Overview: Has a history of sleep apnea on a BiPAP machine.  His wife notes that he       is not using it consistently.  The patient reports that he had a infection       on the back of his scalp and he was worried the straps were in a rub on       it.  Does not give me a clear reason why he does not want to use it.    Vitamin D insufficiency (POA: Yes)    Gout (POA: Yes)  Resolved Problems:    * No resolved hospital problems. *      FOLLOW UP  Future Appointments   Date Time Provider Department Center   11/22/2024 11:00 AM Brett Cantrell D.O. [facility] None   2/27/2025  3:40 PM NANCY Woodward     No follow-up provider specified.    MEDICATIONS ON DISCHARGE     Medication List        CHANGE how you take these medications        Instructions   HumaLOG KwikPen 100 UNIT/ML Sopn injection PEN  What changed:   how much to take  how to take this  when to take this  Generic drug: insulin lispro   Doctor's comments: ZERO refills remain on this prescription. Your patient is requesting advance approval of refills for this medication to PREVENT ANY MISSED DOSES  INJECT 5 UNITS UNDER SKIN 3 TIMES A DAY BEFORE MEALS. 5 UNITS FOR SUGARS= 101-150. INCREASE BY 2  UNITS IF>150. CORRECTION DOSAGE IS 2:50>150            CONTINUE taking these medications        Instructions   acetaminophen 500 MG Tabs  Commonly known as: Tylenol   Take 500-1,000 mg by mouth every 6 hours as needed. Indications: Pain  Dose: 500-1,000 mg     allopurinol 100 MG Tabs  Commonly known as: Zyloprim   Take 1 Tablet by mouth every evening.  Dose: 100 mg     amLODIPine 10 MG Tabs  Commonly known as: Norvasc   Take 1 Tablet by mouth every day.  Dose: 10 mg     atorvastatin 80 MG tablet  Commonly known as: Lipitor   TAKE 1 TABLET BY MOUTH EVERY EVENING  Dose: 80 mg     baclofen 5 MG Tabs  Commonly known as: Lioresal   Take 1 Tablet by mouth every evening for 180 days. And may consider 1/2 tablet by mouth as needed  Dose: 5 mg     carvedilol 6.25 MG Tabs  Commonly known as: Coreg   Take 1 Tablet by mouth 2 times a day with meals.  Dose: 6.25 mg     clopidogrel 75 MG Tabs  Commonly known as: Plavix   TAKE 1 TABLET BY MOUTH EVERY DAY  Dose: 75 mg     Dulaglutide 1.5 MG/0.5ML Sopn   Inject 1 Pen under the skin every 7 days.  Dose: 1 Pen     finasteride 5 MG Tabs  Commonly known as: Proscar   Take 5 mg by mouth every evening.  Dose: 5 mg     fluoxetine 40 MG capsule  Commonly known as: PROzac   TAKE 1 CAPSULE BY MOUTH EVERY DAY  Dose: 40 mg     * FreeStyle Leanna 3 Plus Sensor Misc   Doctor's comments: Please dispense new CGM 15 day sensors for Leanna 3. Call [phone] or fax [phone] with questions or if clarification is needed.  1 Each see administration instructions. New Leanna 3 CGM 15 day sensors  Dose: 1 Each     * FreeStyle Leanna 3 Sensor Misc   Doctor's comments: I called Medicare and confirmed that the sensors are covered for Av. They asked that I check with you and make sure that the order was submitted under Part B Medicare and NOT Part D.  1 Each every 14 days.  Dose: 1 Each     magnesium oxide 400 MG Tabs tablet  Commonly known as: Mag-Ox   Take 800 mg by mouth 2 times a day.  Dose: 800 mg      olmesartan 40 MG Tabs  Commonly known as: Benicar   Doctor's comments: Please call to schedule follow up appointment for further refills. Please call [phone]. Thank you.  TAKE 1 TABLET BY MOUTH EVERY EVENING  Dose: 40 mg     omeprazole 20 MG delayed-release capsule  Commonly known as: PriLOSEC   Take 1 Capsule by mouth every day.  Dose: 20 mg     OneTouch Ultra strip  Generic drug: glucose blood   1 Strip by Other route 4 Times a Day,Before Meals and at Bedtime.  Dose: 1 Each     Pen Needles 32G X 4 MM Misc   1 Each 5 Times a Day. USE FOR INSULIN SHOTS FIVE TIMES DAILY  Dose: 1 Each     Potassium Chloride CR 8 MEQ Cpcr capsule  Commonly known as: Micro-K   TAKE 1 CAPSULE BY MOUTH EVERY 48 HOURS  Dose: 8 mEq     Probiotic Daily Caps   Take 1 Capsule by mouth in the morning, at noon, and at bedtime.  Dose: 1 Capsule     Toujeo SoloStar 300 UNIT/ML Sopn  Generic drug: Insulin Glargine (1 Unit Dial)   Inject 21-36 Units under the skin every day.  Dose: 21-36 Units     vitamin D 2000 UNIT Tabs   Take 2,000 Units by mouth every morning.  Dose: 2,000 Units           * This list has 2 medication(s) that are the same as other medications prescribed for you. Read the directions carefully, and ask your doctor or other care provider to review them with you.                  Allergies  Allergies   Allergen Reactions    Diphenhydramine Anxiety     Pt is able to tolerate  Mg benadryl with less anxiety    Lorazepam Unspecified     Disorientation    Spironolactone Unspecified     Acute kidney injury    Ciprofloxacin Rash     Rash,stomach ache       DIET  Orders Placed This Encounter   Procedures    Diet Order Diet: Consistent CHO (Diabetic)     Standing Status:   Standing     Number of Occurrences:   1     Order Specific Question:   Diet:     Answer:   Consistent CHO (Diabetic) [4]       ACTIVITY  As tolerated.  Weight bearing as tolerated    CONSULTATIONS      PROCEDURES      LABORATORY  Lab Results   Component Value Date     SODIUM 134 (L) 11/16/2024    POTASSIUM 4.2 11/16/2024    CHLORIDE 104 11/16/2024    CO2 21 11/16/2024    GLUCOSE 138 (H) 11/16/2024    BUN 23 (H) 11/16/2024    CREATININE 0.92 11/16/2024    CREATININE 1.4 05/28/2008        Lab Results   Component Value Date    WBC 10.5 11/16/2024    HEMOGLOBIN 13.2 (L) 11/16/2024    HEMATOCRIT 40.1 (L) 11/16/2024    PLATELETCT 244 11/16/2024        Total time of the discharge process exceeds 38 minutes.

## 2024-11-16 NOTE — ASSESSMENT & PLAN NOTE
Asymptomatic  Patient's urinalysis shows protein 30, leukocyte esterase moderate, urine is packed with white blood cells, and has many bacteria.  Patient usually has colonization and urology has stated that the patient should not be treated unless the patient is symptomatic.  In the past every time patient was treated he developed C. difficile colitis and so at this point there wife would like us to hold off on antibiotics as she is afraid he will get C. difficile colitis and he has been asymptomatic with his bladder infection.  Discussed that with pharmacy as well at this point we will hold off on antibiotics as the patient is asymptomatic, white blood cell count is only 10.2, patient is afebrile, patient is normotensive at this point and his electrolytes are normal as well.  He is not acidotic with a CO2 level of 20.

## 2024-11-16 NOTE — PROGRESS NOTES
4 Eyes Skin Assessment Completed by LETICIA Vila and LETICIA Garcia.    Head Pink  Ears WDL  Nose Scab  Mouth WDL  Neck WDL  Breast/Chest WDL  Shoulder Blades WDL  Spine WDL  (R) Arm/Elbow/Hand Scattered Bruising   (L) Arm/Elbow/Hand Scattered Bruising  Abdomen Scar midline  Groin Redness  Scrotum/Coccyx/Buttocks Redness and Blanching  (R) Leg WDL  (L) Leg WDL  (R) Heel/Foot/Toe Redness and Blanching  (L) Heel/Foot/Toe Redness and Blanching          Devices In Places Tele Box, Blood Pressure Cuff, and Pulse Ox      Interventions In Place Heel Float Boots, Waffle Overlay, Pillows, Q2 Turns, and Barrier Cream    Possible Skin Injury Yes    Pictures Uploaded Into Epic N/A  Wound Consult Placed N/A  RN Wound Prevention Protocol Ordered No

## 2024-11-16 NOTE — ASSESSMENT & PLAN NOTE
Initially had hypotensive episode in the field from paramedics.  Here in the hospital the patient has not been hypotensive at all  The patient's wife believes that the reason he was hypotensive as he took all his blood pressure medications at home together and he chewed them up.  Monitor blood pressure  Give fluid resuscitation  Space out antihypertensive management

## 2024-11-16 NOTE — ASSESSMENT & PLAN NOTE
-accus with sliding scale coverage, long-acting insulin with Lantus  -diabetic diet  -diabetic education  -follow glycohemoglobin levels long term, most recent hemoglobin A1c 7.7  -continue with oral antihyperglycemics  -monitor for hypoglycemic episodes and adjust control if he should get low-accus with sliding scale coverage

## 2024-11-16 NOTE — CARE PLAN
The patient is Stable - Low risk of patient condition declining or worsening    Shift Goals  Clinical Goals: saftey, monitor mentation, monitor labs  Patient Goals: rest  Family Goals: Wellness    Progress made toward(s) clinical / shift goals:    Problem: Fall Risk  Goal: Patient will remain free from falls  Outcome: Progressing    Patient remained free from falls during shift. Fall precautions in place with bed locked in lowest position and bed and strip alarm on.    Patient is not progressing towards the following goals:      Problem: Knowledge Deficit - Standard  Goal: Patient and family/care givers will demonstrate understanding of plan of care, disease process/condition, diagnostic tests and medications  Outcome: Not Progressing    Patient unable to verbalize plan of care due to confusion and patient disoriented to time, place, and situation. Patient reoriented throughout shift.

## 2024-11-16 NOTE — PROGRESS NOTES
Assumed care of patient at bedside report from day shift RN. Updated on POC. Patient currently A & O x 1 patient oriented to person and disoriented to place, time, and situation; Patient reoriented. Unable to complete MRI screening at this time due to patient's mentation. Patient on room air O2 96 percent oxygen saturation; up 2 person assist; without complaints of acute pain. Assessment completed. Call light within reach. Whiteboard updated. Fall precautions in place. Bed locked and in lowest position. No other needs indicated at this time.

## 2024-11-16 NOTE — ASSESSMENT & PLAN NOTE
Patient has chronic left-sided hemiparesis from a stroke back in 2016  Patient over the past week has had 3 episodes where his speech became more garbled and the wife was very concerned about a CVA.  Patient will be evaluated an MRI of the head and an echocardiogram and we will have PT OT evaluate him as well.

## 2024-11-16 NOTE — PROGRESS NOTES
IV and tele box removed. Discharge paperwork reviewed with patient and wife. Pt left unit with belongings in a wheelchair with family and staff.

## 2024-11-17 DIAGNOSIS — R25.2 SPASTICITY: ICD-10-CM

## 2024-11-17 NOTE — THERAPY
Occupational Therapy   Initial Evaluation     Patient Name: Av Bob  Age:  79 y.o., Sex:  male  Medical Record #: [id number]  Today's Date: 11/16/2024     Precautions  Precautions: (P) Fall Risk    Assessment  Patient is 79 y.o. male admitted with garbled speech, AMS. Diagnosis of hypotension arterial. Additional factors influencing patient status / progress: CVA in 2016, residual L sided hemiplegia, expressive aphasia. Non-ambulatory; uses wc at home (up ~5 hrs each day per wife). Pt is able to make needs known; slow to respond. Spouse provides care- Total A with toileting, sponge bathing, LB dressing from bed. Pt indep with feeding, grooming. Son and other family members close by to help when needed. Pt uses transfer pole at bedside, gait belt and A from wife for to/from wc. Also uses swivel disc for transfers when needed. Bed rails in place at home. PT tech visits 2x/week for R side strengthening/maintenance. MaxA with sup to sit. Pt tolerates EOB sit for activity (grooming, UB dressing). See below for CLOF. Spouse states pt is at his baseline, no DME needed, states pt performs best at home with their set up and she feels confident for bringing pt home today. Declines HH. No further acute OT needs.            Plan  OT Eval only. Spouse states pt is at his baseline and no PT eval needed. Will dc home today.  DC Equipment Recommendations: None  Discharge Recommendations: Anticipate that the patient will have no further occupational therapy needs after discharge from the hospital     Subjective  Agreeable     Objective   11/16/24 1332   Prior Living Situation   Prior Services Continuous (24 Hour) Care Giving Family   Housing / Facility 1 Story House   Steps Into Home 0   Steps In Home 0   Bathroom Set up Walk In Shower;Shower Chair   Equipment Owned Wheelchair;Power Wheel Chair;Tub / Shower Seat  (transfer pole at bedside, swivel disc, gait belt)   Lives with - Patient's Self Care Capacity Spouse    Comments Pt with hx of CVA from 2016, L sided hemiplegia, expressive aphasia. Wife is caregiver. PT tech visits 2x/week. Son, grnadson help when needed.   Prior Level of ADL Function   Self Feeding Independent   Grooming / Hygiene Independent   Bathing Dependent   Dressing Dependent   Toileting Dependent   Prior Level of IADL Function   Medication Management Requires Assist   Laundry Dependent   Kitchen Mobility Requires Assist   Finances Dependent   Home Management Dependent   Shopping Dependent   Prior Level Of Mobility Uses Wheel Chair for in Home Mobility   Driving / Transportation Relatives / Others Provide Transportation   Occupation (Pre-Hospital Vocational) Retired Due To Age   Leisure Interests Unable To Determine At This Time   Precautions   Precautions Fall Risk   Vitals   O2 Delivery Device None - Room Air   Pain 0 - 10 Group   Therapist Pain Assessment 0;Nurse Notified   Cognition    Level of Consciousness Alert   Comments expressive aphasia. pleasant and cooperative.   Passive ROM Upper Body   Passive ROM Upper Body X   Comments LUE limited/non functional   Active ROM Upper Body   Active ROM Upper Body  X   Dominant Hand Right   Comments LUE with contractures   Strength Upper Body   Upper Body Strength  WDL  (RUE)   Sensation Upper Body   Upper Extremity Sensation  Not Tested   Upper Body Muscle Tone   Upper Body Muscle Tone  X   Comments LUE with hypertonicity   Coordination Upper Body   Coordination X   Balance Assessment   Sitting Balance (Static) Fair -   Sitting Balance (Dynamic) Poor +   Weight Shift Sitting Poor   Bed Mobility    Supine to Sit Maximal Assist   Sit to Supine Maximal Assist   Scooting Maximal Assist   Rolling Maximal Assist to Rt.;Maximum Assist to Lt.   ADL Assessment   Eating Supervision   Grooming Contact Guard Assist;Seated   Upper Body Dressing Maximal Assist   Lower Body Dressing Total Assist   Toileting Total Assist   How much help from another person does the patient  "currently need...   Putting on and taking off regular lower body clothing? 1   Bathing (including washing, rinsing, and drying)? 1   Toileting, which includes using a toilet, bedpan, or urinal? 1   Putting on and taking off regular upper body clothing? 2   Taking care of personal grooming such as brushing teeth? 3   Eating meals? 3   6 Clicks Daily Activity Score 11   Activity Tolerance   Sitting Edge of Bed 10\"x2   Education Group   Education Provided Home Safety   Role of Occupational Therapist Patient Response Patient;Family;Explanation;Verbal Demonstration;Acceptance   Home Safety Patient Response Family;Patient;Acceptance;Explanation;Verbal Demonstration   Anticipated Discharge Equipment and Recommendations   DC Equipment Recommendations None   Discharge Recommendations Anticipate that the patient will have no further occupational therapy needs after discharge from the hospital   Interdisciplinary Plan of Care Collaboration   IDT Collaboration with  Family / Caregiver;Nursing   Patient Position at End of Therapy Edge of Bed;Family / Friend in Room;Call Light within Reach  (about to transfer to  for dc home. son present and will help to car.)   Collaboration Comments OT Eval                 "

## 2024-11-18 ENCOUNTER — PATIENT OUTREACH (OUTPATIENT)
Dept: MEDICAL GROUP | Facility: PHYSICIAN GROUP | Age: 79
End: 2024-11-18
Payer: MEDICARE

## 2024-11-18 ENCOUNTER — TELEMEDICINE (OUTPATIENT)
Dept: MEDICAL GROUP | Facility: PHYSICIAN GROUP | Age: 79
End: 2024-11-18
Payer: MEDICARE

## 2024-11-18 VITALS
HEIGHT: 72 IN | DIASTOLIC BLOOD PRESSURE: 73 MMHG | HEART RATE: 68 BPM | TEMPERATURE: 97.7 F | OXYGEN SATURATION: 96 % | BODY MASS INDEX: 27.06 KG/M2 | SYSTOLIC BLOOD PRESSURE: 129 MMHG

## 2024-11-18 DIAGNOSIS — Z79.4 TYPE 2 DIABETES MELLITUS WITH STAGE 3B CHRONIC KIDNEY DISEASE, WITH LONG-TERM CURRENT USE OF INSULIN (HCC): ICD-10-CM

## 2024-11-18 DIAGNOSIS — Z09 HOSPITAL DISCHARGE FOLLOW-UP: Primary | ICD-10-CM

## 2024-11-18 DIAGNOSIS — N18.32 TYPE 2 DIABETES MELLITUS WITH STAGE 3B CHRONIC KIDNEY DISEASE, WITH LONG-TERM CURRENT USE OF INSULIN (HCC): ICD-10-CM

## 2024-11-18 DIAGNOSIS — I10 ESSENTIAL HYPERTENSION, BENIGN: ICD-10-CM

## 2024-11-18 DIAGNOSIS — R41.0 CONFUSION: ICD-10-CM

## 2024-11-18 DIAGNOSIS — E11.22 TYPE 2 DIABETES MELLITUS WITH STAGE 3B CHRONIC KIDNEY DISEASE, WITH LONG-TERM CURRENT USE OF INSULIN (HCC): ICD-10-CM

## 2024-11-18 DIAGNOSIS — N39.0 RECURRENT UTI: ICD-10-CM

## 2024-11-18 DIAGNOSIS — E86.1 HYPOTENSION DUE TO HYPOVOLEMIA: ICD-10-CM

## 2024-11-18 RX ORDER — BACLOFEN 5 MG/1
TABLET ORAL
Qty: 60 TABLET | Refills: 2 | Status: SHIPPED | OUTPATIENT
Start: 2024-11-18

## 2024-11-18 RX ORDER — AMLODIPINE BESYLATE 5 MG/1
5 TABLET ORAL DAILY
Qty: 90 TABLET | Refills: 3 | Status: SHIPPED | OUTPATIENT
Start: 2024-11-18

## 2024-11-18 NOTE — TELEPHONE ENCOUNTER
Was the patient seen in the last year in this department? Yes    Does patient have an active prescription for medications requested? Yes     Do they have a refill on file? No     If a controlled substance, is a new  on file? No     Received Request Via: Pharmacy    If pharmacy requests, is the patient still taking the medication or medication discontinued?Yes

## 2024-11-18 NOTE — PROGRESS NOTES
Nurse CM outreach call to pt's spouse for hospital TCM follow-up.  Discharge questions reviewed. Spouse reports pt feeling better since discharge. Reports pt currently sleeping.  Spouse reports blood pressure reading yesterday was 117/77 before taking medications.  Pt is eating and taking fluids. Pt scheduled to see PCP later today for telemedicine visit.    Transitional Care Management  TCM Outreach Date and Time: Filed (11/18/2024  9:18 AM)    Discharge Questions  Actual Discharge Date: 11/16/24  Now that you are home, how are you feeling?: Good  Did you receive any new prescriptions?: No  Do you have any questions about your current medications or new medications (Review Med Rec)?: No  Did you have any durable medical equipment ordered?: No  Do you have a follow up appointment scheduled with your PCP?: Yes  Appointment Date: 11/18/24  Any issues or paperwork you wish to discuss with your PCP?: No  Are you (patient) able to get to the appointment?: Yes  If Home Health was ordered, have they contacted you (Patient): Not Applicable  Did you have enough support after your last discharge?: Yes  Does this patient qualify for the CCM program?: Yes    Transitional Care  Number of attempts made to contact patient: 1  Current or previous attempts completed within two business days of discharge? : Yes  Provided education regarding treatment plan, medications, self-management, ADLs?: Yes  Has patient completed an Advanced Directive?: Yes  Has the Care Manager's phone number provided?: Yes  Is there anything else I can help you with?: No    Discharge Summary  Chief Complaint: ALOC  Admitting Diagnosis: ALOC  Discharge Diagnosis: Hypotension

## 2024-11-19 ENCOUNTER — PATIENT MESSAGE (OUTPATIENT)
Dept: CARDIOLOGY | Facility: MEDICAL CENTER | Age: 79
End: 2024-11-19
Payer: MEDICARE

## 2024-11-19 NOTE — PROGRESS NOTES
Subjective:     Av Bob is a 79 y.o. male who presents for Hospital Follow-up.    HPI:   Recently hospitalized for altered mental status from 11/15-11/16/2024.    History of Present Illness  The patient is a 79-year-old male who presents via virtual visit for a transitional care medicine hospital follow-up. His wife Usha provides the majority of the recent medical history.    He was admitted to the hospital due to an altered mental status, which his caregiver reported had occurred briefly on two previous occasions. The third episode, which happened after breakfast, led to his hospital admission. His blood pressure was low at home and remained low upon arrival at the emergency department until receiving IV fluids, after which his condition improved. A brain MRI was performed due to his history of strokes, but no acute issues were found. An echocardiogram showed an ejection fraction of 55 percent and no acute abnormalities. A urine culture revealed the presence of E. Coli, pansensitive, with history of both recurrent UTI's as well as Cdiff most recently in January 2024. Prior to his hospital admission, he was not consuming enough fluids and did not have a fever or abdominal tenderness. Condition likely exacerbated by recent addition back of Trulicity 1.5 mg weekly which may have worsened appetite/nausea leading to decreased oral intake.    He is currently on amlodipine 10 mg daily, Olmesartan 40 mg daily, and Coreg 6.25 mg twice daily and is waiting to establish care with his new cardiologist since Dr. Tucker's departure. His caregiver reports that he has not experienced any recurrence of symptoms such as garbled speech or confusion. He is able to assist with his own transfers and his blood pressure, checked 2 to 3 times daily, has been in the 110s/70s range.    His caregiver is attempting to get him a continuous glucose monitor (CGM) and he is currently on Toujeo 3 or 5 units.         Current medicines  (including reconciliation performed today)  Current Outpatient Medications   Medication Sig Dispense Refill    baclofen (LIORESAL) 5 MG Tab TAKE 1 TABLET BY MOUTH EVERY EVENING. MAY CONSIDER 1/2 TABLET BY MOUTH AS NEEDED 60 Tablet 2    amLODIPine (NORVASC) 5 MG Tab Take 1 Tablet by mouth every day. 90 Tablet 3    acetaminophen (TYLENOL) 500 MG Tab Take 500-1,000 mg by mouth every 6 hours as needed. Indications: Pain      Continuous Glucose Sensor (FREESTYLE LLOYD 3 PLUS SENSOR) Misc 1 Each see administration instructions. [place] 3 CGM 15 day sensors 6 Each 3    allopurinol (ZYLOPRIM) 100 MG Tab Take 1 Tablet by mouth every evening. 100 Tablet 3    olmesartan (BENICAR) 40 MG Tab TAKE 1 TABLET BY MOUTH EVERY EVENING 90 Tablet 2    Insulin Glargine, 1 Unit Dial, (TOUJEO SOLOSTAR) 300 UNIT/ML Solution Pen-injector Inject 21-36 Units under the skin every day. (Patient taking differently: Inject 5-17 Units under the skin every day.) 7.5 mL 3    glucose blood (ONETOUCH ULTRA) strip 1 Strip by Other route 4 Times a Day,Before Meals and at Bedtime. 400 Strip 3    Potassium Chloride CR (MICRO-K) 8 MEQ Cap CR capsule TAKE 1 CAPSULE BY MOUTH EVERY 48 HOURS 45 Capsule 3    clopidogrel (PLAVIX) 75 MG Tab TAKE 1 TABLET BY MOUTH EVERY DAY 90 Tablet 3    atorvastatin (LIPITOR) 80 MG tablet TAKE 1 TABLET BY MOUTH EVERY EVENING 100 Tablet 3    HUMALOG KWIKPEN 100 UNIT/ML Solution Pen-injector injection PEN INJECT 5 UNITS UNDER SKIN 3 TIMES A DAY BEFORE MEALS. 5 UNITS FOR SUGARS= 101-150. INCREASE BY 2 UNITS IF>150. CORRECTION DOSAGE IS 2:50>150 (Patient taking differently: Inject 5-7 Units under the skin 3 times a day before meals. INJECT 5 UNITS UNDER SKIN 3 TIMES A DAY BEFORE MEALS. 5 UNITS FOR SUGARS= 101-150. INCREASE BY 2 UNITS IF>150. CORRECTION DOSAGE IS 2:50>150) 9 mL 3    fluoxetine (PROZAC) 40 MG capsule TAKE 1 CAPSULE BY MOUTH EVERY DAY 90 Capsule 3    carvedilol (COREG) 6.25 MG Tab Take 1 Tablet by mouth 2 times a day  with meals. 180 Tablet 3    Insulin Pen Needle (PEN NEEDLES) 32G X 4 MM Misc 1 Each 5 Times a Day. USE FOR INSULIN SHOTS FIVE TIMES DAILY 500 Each 3    Probiotic Product (PROBIOTIC DAILY) Cap Take 1 Capsule by mouth in the morning, at noon, and at bedtime.      finasteride (PROSCAR) 5 MG Tab Take 5 mg by mouth every evening.      Cholecalciferol (VITAMIN D) 2000 UNIT Tab Take 2,000 Units by mouth every morning.      magnesium oxide (MAG-OX) 400 MG Tab tablet Take 800 mg by mouth 2 times a day.      omeprazole (PRILOSEC) 20 MG delayed-release capsule Take 1 Capsule by mouth every day. 30 Capsule 2     No current facility-administered medications for this visit.       Allergies:   Diphenhydramine, Lorazepam, Spironolactone, and Ciprofloxacin    Social History     Tobacco Use    Smoking status: Never    Smokeless tobacco: Never   Vaping Use    Vaping status: Never Used   Substance Use Topics    Alcohol use: Never    Drug use: Never       ROS:  CONSTITUTIONAL:  No fever/chills or new weakness  HEENT:  No pain, erythema, or discharge. No blurring of vision; No sore throat   CARDIOVASCULAR:  No chest pain, palpitations or swelling edema.  RESPIRATORY:  No shortness of breath, cough, hemoptysis or shortness of breath  GI/:  Good appetite. No nausea, vomiting, diarrhea, or pain. No blood in stool/dark stool. No hematuria/dysuria. Urine is not malodorous.      Objective:     Vitals:    11/18/24 1511   BP: 129/73   BP Location: Right arm   Pulse: 68   Temp: 36.5 °C (97.7 °F)   TempSrc: Temporal   SpO2: 96%   Height: 1.829 m (6')     Body mass index is 27.06 kg/m².      Physical Exam  Vitals reviewed.   Constitutional:       General: He is not in acute distress.  HENT:      Mouth/Throat:      Comments: Phonation normal  Eyes:      General: No scleral icterus.  Pulmonary:      Effort: Pulmonary effort is normal.      Breath sounds: No stridor.      Comments: No audible wheezing  Abdominal:      Tenderness: There is no  abdominal tenderness.      Comments: No abdominal pain when spouse compresses abdomen   Musculoskeletal:      Comments: Chronic Left side constriction    Skin:     Comments: No rash in visible locations.   Neurological:      Mental Status: He is alert.      Comments: Chronic Left side weakness secondary to CVA     Psychiatric:         Mood and Affect: Mood normal.      Comments: Appears in good spirits       Results  Laboratory Studies  Urine culture showed E. coli. WBC count showed a slight left shift.    Imaging  MRI ruled out any acute issues. Echocardiogram showed EF of 55.    Assessment & Plan  1. Hospital follow up appointment/TCM  2. Altered Mental Status; likely secondary to hypotension and dehydration.  3. History of hypertension, worry about over-treatment.  The patient experienced brief episodes of altered mental status, which occurred three times, with the third episode leading to an emergency department visit. His blood pressure was low during these episodes and improved with fluid administration. A brain MRI ruled out any acute cerebrovascular accident (CVA). He has a history of strokes, but no recurrence of symptoms such as garbled speech or confusion has been noted. His blood pressure has been stable at home, with systolic readings in the 110s. Hypovolemic hypotension is suspected as the cause of his symptoms.     He is currently on amlodipine, Olmesartan, and carvedilol. Given the risk of orthostasis and his stable condition, the dosage of amlodipine will be reduced by half from 10 mg to 5 mg, with the potential to discontinue it entirely if he remains stable. Follow-up with a new cardiologist, Dr. Myles, is recommended to reassess his medication regimen and overall cardiovascular health.    4. Recurrent UTI; urine culture with pansensitive E. coli.  A urine culture indicated the presence of E. coli. Given his history of severe C. Difficile and chronic bacteruria, antibiotic for UTI were not advised by  inpatient team.  Patient was encouraged to maintain adequate hydration.     5. Diabetes Mellitus type 2 on long term insulin.  His A1c levels have increased to nearly 8, compared to his usual low 7s, potentially due to steroid injections and other treatments. Trulicity will be reintroduced at a lower dose of 0.75 mg for a trial period of 1 to 2 months. Efforts to obtain a continuous glucose monitor (CGM) have been challenging, but it is necessary to monitor his blood glucose levels closely.        Assessment and Plan:   Problem List Items Addressed This Visit       Confusion    Essential hypertension, benign    Relevant Medications    amLODIPine (NORVASC) 5 MG Tab    Hypotension    Relevant Medications    amLODIPine (NORVASC) 5 MG Tab    Recurrent UTI    Type 2 diabetes mellitus with stage 3b chronic kidney disease, with long-term current use of insulin ([facility])     Other Visit Diagnoses       Hospital discharge follow-up    -  Primary                - Chart and discharge summary were reviewed.   - Hospitalization and results reviewed with patient.   - Medications reviewed including instructions regarding high risk medications, dosing and side effects.  - Recommended Services: No services needed at this time  - Advance directive/POLST on file?  Yes    Follow-up: February 27, 2024    Face-to-face transitional care management services with HIGH (today's visit is within days post discharge & LACE+ score 59+) medical decision complexity were provided.     LACE+ Historical Score Over Time (0-28: Low, 29-58: Medium, 59+: High): 76      Patient was seen in conjunction with our geriatric fellow, Dr. Torri Teague. Pertinent details of the history and examination were verified and we discussed the assessment and plan in detail. Orders were reviewed together. I agree with the documentation in the note with additions or changes made as indicated above.      Madison Bunch, DO  Geriatric and Internal Medicine  [facility]     Torri Teague MD  Geriatric Medicine fellow

## 2024-11-19 NOTE — PATIENT COMMUNICATION
Schedulers: Can this pt be scheduled with [facility] on 12/3 at 1:15 as a virtual appt for hospital follow-up? Please let me know so I can confirm with them! Thank you!   10-Apr-2023 19:06

## 2024-11-21 ENCOUNTER — PATIENT MESSAGE (OUTPATIENT)
Dept: CARDIOLOGY | Facility: MEDICAL CENTER | Age: 79
End: 2024-11-21
Payer: MEDICARE

## 2024-11-22 ENCOUNTER — PATIENT MESSAGE (OUTPATIENT)
Dept: CARDIOLOGY | Facility: MEDICAL CENTER | Age: 79
End: 2024-11-22

## 2024-11-22 ENCOUNTER — TELEMEDICINE (OUTPATIENT)
Dept: PHYSICAL MEDICINE AND REHAB | Facility: MEDICAL CENTER | Age: 79
End: 2024-11-22
Payer: MEDICARE

## 2024-11-22 VITALS
DIASTOLIC BLOOD PRESSURE: 82 MMHG | OXYGEN SATURATION: 95 % | SYSTOLIC BLOOD PRESSURE: 135 MMHG | HEART RATE: 75 BPM | TEMPERATURE: 97.3 F

## 2024-11-22 DIAGNOSIS — Z91.81 RISK FOR FALLS: ICD-10-CM

## 2024-11-22 DIAGNOSIS — I69.954 HEMIPLEGIA AND HEMIPARESIS FOLLOWING UNSPECIFIED CEREBROVASCULAR DISEASE AFFECTING LEFT NON-DOMINANT SIDE (HCC): ICD-10-CM

## 2024-11-22 DIAGNOSIS — R25.2 SPASTICITY: ICD-10-CM

## 2024-11-22 DIAGNOSIS — M24.561 CONTRACTURE OF RIGHT KNEE: ICD-10-CM

## 2024-11-22 DIAGNOSIS — Z71.82 EXERCISE COUNSELING: ICD-10-CM

## 2024-11-22 PROCEDURE — 99214 OFFICE O/P EST MOD 30 MIN: CPT | Mod: 95 | Performed by: GENERAL PRACTICE

## 2024-11-22 PROCEDURE — G2211 COMPLEX E/M VISIT ADD ON: HCPCS | Mod: 95 | Performed by: GENERAL PRACTICE

## 2024-11-22 ASSESSMENT — PATIENT HEALTH QUESTIONNAIRE - PHQ9: CLINICAL INTERPRETATION OF PHQ2 SCORE: 0

## 2024-11-22 ASSESSMENT — PAIN SCALES - GENERAL: PAINLEVEL_OUTOF10: NO PAIN

## 2024-11-22 NOTE — PATIENT COMMUNICATION
JA: Pt just reduced amlodipine to 5mg 11/20. There is the same message sent to PCP who made the change. Thank you

## 2024-11-22 NOTE — PROGRESS NOTES
[facility]  [facility]   [address] [place] [address]  Ph: [phone]    SPASTICITY CLINIC    Patient Name: Av Bob   Patient : 1945  PCP: Madison Bunch D.O.    Referring Physician: No ref. provider found   Reason for Referral: Spasticity Management   Examining Physician: Brett Cantrell DO  Date of Service: 12/10/2023      Telemedicine Video Visit: Established Patient   This Remote Face to Face encounter was conducted via Zoom. The patient was in their home in the [facility].  Given the patient's difficulty with transportation, I am providing medical care to this patient via audio/video visit in place of an in person visit at the request of the patient. Verbal consent to telehealth, risks, benefits, and consequences were discussed. Patient retains the right to withdraw at any time. All existing confidentiality protections apply. The patient has access to all transmitted medical information. No dissemination of any patient images or information to other entities without further written consent.      SUBJECTIVE:   Patient Identification: Av Bob is a 79 y.o. 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 Huff from 22 until 23.   -2024 PCP status post hospitalization for confusion  11/15-24 hospitalization   nephro CKD4    Accompanied by Today: Wife, Usha, assists with history.     Procedures:  2020 right L3-4 and L4-5 transforaminal epidural steroid injection 95% pain  relief and follow-up visit Dr Bennett  3/29/2021 right L3-4 and L4-5 transforaminal epidural steroid injection 90% improved postprocedure.  Dr Bennett    History of Present Illness:      The patient came in to check on their left arm and leg spasticity. He is accompanied by his wife.    They mentioned that their left arm has been easier to move around since the last Botox injection on October 11, 2024. Their exercise therapist also noticed that their left leg was a lot looser about three weeks after the injection.    They are currently taking 5 mg of baclofen at night, which has significantly helped with their leg pain. When they were in the emergency room, they had severe leg pain. The ER doctor suggested morphine, but they preferred not to take it. Instead, they took a couple of Tylenol, which helped ease the pain.    They report no recent infections, skin breakdown, urinary retention, or constipation. However, they have been having loose bowel movements, which they manage by eating rice and apple sauce. They also had a urinary tract infection (UTI) during their hospital stay, but it was not treated due to their history of C. difficile.      Prior notes  -AFO L when exercises and ambulates. No wrist splint   -right knee contracture. Nonambulatory. Painful. Typically controlled gabapentin. Wheelchair bound.   - Prior evaluation with Dr Bennett with Gabapentin and effective for neuropathic pain.  Received prior epidural steroid injections but no active back pain at this time.    -May 2024 C. difficile infection.       Current Spasticity Medications and Dosages:  Baclofen 5mg at night effective    Past Spasticity Medications and Dosages:  Baclofen d/t cognitive effects    Previous Spasticity Injection History:    10/11/24  Location Botox Amount in Units   Left Pec sonya and minor 100 units total (2 locations)   Left Biceps    200 units (4 locations)   Left Hamstring - semimembranosus 100 units (2 locations)   Left  Hamstring - semitendinosus  100 units (2 locations)   Left Hamstring - Bicep femoris long head and short head 100 units (2 locations)   Total Units  600 units     7/3/24  Location Botox Amount in Units   Pec sonya and minor 100 units total (3 locations)   Biceps    200 units (4 locations)   Hamstring - semimembranosus 100 units (3 locations)   Hamstring - semitendinosus  100 units (3 locations)   Hamstring - Bicep femoris long head and short head 100 units (3 locations)   Total Units  600 units       3/25/24, 8/24/23    Botox: left upper extremity + lower extremity  Location Botox Amount in Units   Pec sonya and minor 150 units total (3 locations)   Biceps    200 units (4 locations)   Hamstring - semimembranosus 150 units (3 locations)   Hamstring - semitendinosus  150 units (3 locations)   Hamstring - Bicep femoris long head and short head 150 units (3 locations)   Total Units  800 units         200 units of SAMPLES used to get better movement from PEC and HAMSTRING    5/25/23, 2/13/23  Botox: left upper extremity + lower extremity  Location Botox Amount in Units   Pec sonya and minor 50 units each muscle (100 total)   Biceps    200 units (4 locations)   Hamstring - semimembranosus 100 units (2 locations)   Hamstring - semitendinosus  100 units (2 locations)   Hamstring - Bicep femoris long head and short head 100 units (2 locations)   Total Units  600 units      10/24/22, 6/29/22  Botox: left upper extremity  Location Botox Amount in Units   Biceps    400 units   Total Units  400 units       8/12/21:   Botox: left upper extremity  Location Botox Amount in Units   Biceps    50 units   FCR  75 units   FCU  75 units   Pronator teres   50 units   FDS   75 units   FDP   75 units   Total Units  400 units      5/11/21  Botox: left upper extremity and left lower extremity  Location Botox Amount in Units   Left semimembranosus  75 units   Left semitendinosus  75 units   Left biceps femoris  75 units   Left bicep  75 units   Left  FCR  50 units   Left FCU  50 units   Total Units  400 units        Review of Systems:  Red Flags ROS:   Fever, Chills, Sweats: Denies  Involuntary Weight Loss: Denies  Bladder Incontinence: Denies  Bowel Incontinence: denies  Saddle Anesthesia: Denies  Falls: denies  progressive motor weakness: denies  All other systems reviewed and negative.     Past Medical History:  Past Medical History:   Diagnosis Date    Dyschezia 9/22/2022    For the past 6 months or so he has been experiencing significant pain with defecation.  Does not happen every time.  His wife who is his primary caregiver notes there is no hard stool when he has a painful episodes.  He also is having stool incontinence sometimes when he coughs and other times he will pass a full bowel movement and not be aware.  He has not yet seen GI for this problem.    Hospital discharge follow-up 6/27/2022    Av was admitted to the hospital from 5/28 to 5/31/2022 for weakness and sepsis related to urinary tract infection.  He was discharged to the [facility] rehab for 3 weeks to try to improve mobility and ADLs due to prior stroke.  This was complicated by C. difficile infection as well as lower extremity DVTs.  Diarrhea resolved with oral vancomycin.  Xarelto was changed from prophylaxis to treatment     Acute deep vein thrombosis (DVT) of RLE and partial DVT of LLE 6/19/2022    US LE (6/2022):   1.  Acute appearing RIGHT posterior tibial vein DVT.  2.  Subacute appearing LEFT common femoral vein DVT and proximal femoral   vein junction DVT.   He had leg pain and ultrasound of bilateral lower extremities was completed which was positive for new DVT, acute in the right posterior tibial vein and subacute in the left common femoral and proximal femoral vein junction as noted    UTI (urinary tract infection) 5/28/2022    Pyelonephritis 5/24/2022    Av had abrupt onset of illness starting on Sunday.  He felt unwell and then had projectile vomiting x3 episodes after  dinner that evening.  He had associated nausea but no overt abdominal pain.  He has continued to have anorexia and is having difficulty with his p.o. intake.  He did get in some fruit in a month and yesterday and about 50 to 60 ounces of water.  He has chronic urinary retention    Acute prostatitis 1/13/2022    New problem of prostatitis identified by dispatch health when patient developed hematuria with rectal pain.  He followed up with his urology PA and was placed back on Bactrim.  He was recently on Bactrim and has a history of recurrent urinary tract infections related to neurogenic bladder from prior stroke.  Rectal pain is dissipated however he continues to have hematuria.  He had associated CATA o    Exposure to SARS-associated coronavirus 10/13/2021    He reports viral illness last week with runny nose, congestion, productive cough, and wheezing.  He went to a play of his grandson and may have been exposed to Covid.  He was feeling very bad last Friday and over the weekend and now is at least 50% better.    Hordeolum externum of left lower eyelid 9/14/2021    He reports to clinic with 3 weeks of erythema and pain of the left lower eyelid. No clear inciting cause. Reports no globe pain. Lower > upper eyelid swelling and erythema. After 1.5 weeks had drainage of purulence from the lower medial eyelid. No photosensitivity. Using warm compresses. No changes in visual acuity. Saw retinal specialist around day 1 or 2 of symptoms who felt it could be related     Toenail avulsion, initial encounter- left foot 3rd digit 7/12/2021    They report that his toenail spontaneously came off last week.  He does not recall specific injury or any pain.  His significant other saw the nail on the floor with dried blood on his foot.  She cleaned the wound and covered it with gauze.  On follow-up in clinic today the gauze has stuck to the wound bed but with saline was fairly easy to remove the dried gauze.  There was scant amount  of bright    Pain 06/30/2021    right leg foot    Hypertension 06/30/2021    pt states well controlled on meds    Dysphagia 3/15/2021    He reports progressive worsening of his swallow function.  He notices at least once per week he is choking and coughing, can be with pills or can be with food.  The episodes are quite frightening and he takes a bit of time for him to recover.  He has a prior history of stroke and recalls working with speech-language pathology at that time but does not remember if he did any formal esophagrams or s    Roberto hematuria 1/29/2020    Renal cyst 1/29/2020    Influenza A 1/26/2020    Leg edema, right 1/22/2020    Acute on chronic renal failure (HCC) 1/21/2020    Renal mass 1/21/2020    Hydronephrosis 1/20/2020    History of renal calculi 11/19/2019    Diarrhea 7/2/2019    Acute cystitis without hematuria 6/30/2019    Muscle strain of right gluteal region 5/20/2019    Left hand weakness 5/20/2019    ([facility]) Chronic ulcer of right lower extremity with fat layer exposed 12/27/2018    Enterococcal septicemia ([facility]) 8/12/2017    Hypomagnesemia 08/12/2017    etiology uncertain    NSTEMI (non-ST elevated myocardial infarction) ([facility]) 07/18/2017    complicating UTI with sepsis    Acute respiratory failure with hypoxia ([facility]) 5/20/2017    Septic shock ([facility]) 5/20/2017    Normocytic hypochromic anemia 5/20/2017    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 [1]  He has a history of  chronic kidney disease related to nephrolithiasis, recurrent UTIs, and BPH as well as history of hypertension and diabetes.  He is following with nephrology, Dr. Jarvis and urology, Dr. Barry.  Spironolactone was discontinued due     Stroke ([facility]) 2016    left sided weakness    Skin ulcer of calf (HCC) 2015    Right, Dr. Terry and wound care    Controlled gout 2014    Polyneuropathy in diabetes(357.2) 9/11/2013    Hypokalemia 2012    controlled with combination of ACE inhibitor or ARB plus spironolactone    Wound of left leg 2012    Requiring surgery and debridment, Dr. Moore    Nephrolithiasis 2006    right kidney subsequent lithotripsy by Dr. Barry    CAD (coronary artery disease)     GIOVANNA to RCA in '97, GIOVANNA X2 to LAD and GIOVANNA X2 to OM in '09    Cancer ([facility])     2017; chemo lympoma non hodgkins lymphoma    Cataract     dez iol    Chickenpox     CKD (chronic kidney disease) stage 3, GFR 30-59 ml/min     Coronary atherosclerosis of native coronary artery     S/P PTCA (percutaneous transluminal coronary angioplasty), RCA, 5/1997, patent on cath 7/10/2009 at the time of interventions on his left anterior descending and circumflex coronary arteries    Daytime sleepiness     Depression     Diabetes ([facility])     Difficulty swallowing     Frequent urination     GERD (gastroesophageal reflux disease)     Setswana measles     Insomnia     Kidney stone     Mixed hyperlipidemia     Peripheral vascular disease ([facility])     Urinary bladder disorder     Urinary incontinence     pt wears pads    Weakness       Allergies   Allergen Reactions    Diphenhydramine Anxiety     Pt is able to tolerate  Mg benadryl with less anxiety    Lorazepam Unspecified     Disorientation    Spironolactone Unspecified     Acute kidney injury    Ciprofloxacin Rash     Rash,stomach ache        Past Social History:  Social History     Socioeconomic History    Marital status:      Spouse name: Not on file    Number of children: Not on file    Years of  education: Not on file    Highest education level: Not on file   Occupational History    Not on file   Tobacco Use    Smoking status: Never    Smokeless tobacco: Never   Vaping Use    Vaping status: Never Used   Substance and Sexual Activity    Alcohol use: Never    Drug use: Never    Sexual activity: Not Currently     Partners: Female     Comment: , one daughter, 2 grands   Other Topics Concern     Service Yes    Blood Transfusions No    Caffeine Concern No    Occupational Exposure No    Hobby Hazards No    Sleep Concern Yes    Stress Concern No    Weight Concern No    Special Diet No    Back Care No    Exercise Yes    Bike Helmet No    Seat Belt Yes    Self-Exams Yes   Social History Narrative    Av is from [place], [place] and raised in [place]. He has been in [place] since 2004. 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  He has been  to Usha since 2003, they met through a mutual friend. He has a daughter (Kalina) and a step son (Jimi).     Social Drivers of Health     Financial Resource Strain: Low Risk  (4/1/2024)    Overall Financial Resource Strain (CARDIA)     Difficulty of Paying Living Expenses: Not very hard   Food Insecurity: No Food Insecurity (11/15/2024)    Hunger Vital Sign     Worried About Running Out of Food in the Last Year: Never true     Ran Out of Food in the Last Year: Never true   Transportation Needs: No Transportation Needs (11/15/2024)    PRAPARE - Transportation     Lack of Transportation (Medical): No     Lack of Transportation (Non-Medical): No   Physical Activity: Inactive (4/1/2024)    Exercise Vital Sign     Days of Exercise per Week: 0 days     Minutes of Exercise per Session: 0 min   Stress: No Stress Concern Present  (4/1/2024)    [place] [place] [employer] - Occupational Stress Questionnaire     Feeling of Stress : Only a little   Social Connections: Moderately Isolated (4/1/2024)    Social Connection and Isolation Panel [NHANES]     Frequency of Communication with Friends and Family: Twice a week     Frequency of Social Gatherings with Friends and Family: Twice a week     Attends Jain Services: Never     Active Member of Clubs or Organizations: No     Attends Club or Organization Meetings: Never     Marital Status:    Intimate Partner Violence: Not At Risk (11/15/2024)    Humiliation, Afraid, Rape, and Kick questionnaire     Fear of Current or Ex-Partner: No     Emotionally Abused: No     Physically Abused: No     Sexually Abused: No   Housing Stability: Low Risk  (11/15/2024)    Housing Stability Vital Sign     Unable to Pay for Housing in the Last Year: No     Number of Times Moved in the Last Year: 1     Homeless in the Last Year: No        Family History:  Family History   Problem Relation Age of Onset    Heart Disease Father         CAD    Diabetes Father     Cancer Mother     Psychiatric Illness Mother         Depression    Depression Mother     Kidney stones Brother     Heart Disease Brother     Psychiatric Illness Brother         Depression    Depression Brother     Suicide Attempts Other     Psychiatric Illness Other         autism         OBJECTIVE:   Vital Signs:  Virtual Visit    Physical Exam:   Body Habitus: There is no height or weight on file to calculate BMI. Virtual Visit  Appearance: Well-groomed, well-nourished, not disheveled  Eyes: No scleral icterus to suggest severe liver disease, no proptosis to suggest severe hyperthyroid  ENT -no obvious auditory deficits, no external lesions, moist mucus membranes   Skin - bandaids on his knee for safety.  no rashes or lesions noted. No appreciable skin breakdown on exposed skin areas.    Respiratory-  breathing comfortably on room air, no  audible wheezing, full sentences  Cardiovascular- No lower extremity edema noted.   Psychiatric- alert and oriented,  calm, comfortable, cooperative   Gait - video;    Neuromuscular-  Awake alert.  Conversational.   answering questions appropriately    Prior exam May 2024  Tone on Modified Deuce Scale    L R  R L   Elbow extension (testing tone of elbow flexors) 3  Hip extension (testing hip flexors)     Elbow flexion (testing tone of elbow extensors) 0  Hip abduction (testing adductors)     Wrist extension (testing tone of wrist flexors)  <3  Knee extension (testing knee flexors) 3 3   Finger extension (testing tone of finger flexors) 0  Knee flexion (testing knee extensors)  1   Supination (testing forearm pronators) 3  Dorsiflexion (testing plantarflexors)  0      Plantarflexion (testing dorsiflexors)  1       Prior exam 12/14/24  Tone on Modified Deuce Scale    L R  R L   Elbow extension (testing tone of elbow flexors) 3   Hip extension (testing hip flexors)     Elbow flexion (testing tone of elbow extensors) 0  Hip abduction (testing adductors)     Wrist extension (testing tone of wrist flexors)  3  Knee extension (testing knee flexors)  3   Finger extension (testing tone of finger flexors) 1  Knee flexion (testing knee extensors)  1   Supination (testing forearm pronators) 3  Dorsiflexion (testing plantarflexors)  0      Plantarflexion (testing dorsiflexors)  1         Pertinent Labs:  Lab Results   Component Value Date/Time    SODIUM 134 (L) 11/16/2024 12:39 AM    POTASSIUM 4.2 11/16/2024 12:39 AM    CHLORIDE 104 11/16/2024 12:39 AM    CO2 21 11/16/2024 12:39 AM    GLUCOSE 138 (H) 11/16/2024 12:39 AM    BUN 23 (H) 11/16/2024 12:39 AM    CREATININE 0.92 11/16/2024 12:39 AM    CREATININE 1.4 05/28/2008 05:42 PM    BUNCREATRAT 22.0 03/01/2022 02:22 PM    BUNCREATRAT 10 03/27/2017 02:11 PM       Lab Results   Component Value Date/Time    WBC 10.5 11/16/2024 12:39 AM    RBC 4.68 (L) 11/16/2024 12:39 AM     HEMOGLOBIN 13.2 (L) 11/16/2024 12:39 AM    HEMATOCRIT 40.1 (L) 11/16/2024 12:39 AM    MCV 85.7 11/16/2024 12:39 AM    MCH 28.2 11/16/2024 12:39 AM    MCHC 32.9 11/16/2024 12:39 AM    MPV 9.7 11/16/2024 12:39 AM    NEUTSPOLYS 76.20 (H) 11/15/2024 01:50 PM    LYMPHOCYTES 11.40 (L) 11/15/2024 01:50 PM    MONOCYTES 7.30 11/15/2024 01:50 PM    EOSINOPHILS 3.10 11/15/2024 01:50 PM    BASOPHILS 0.50 11/15/2024 01:50 PM    HYPOCHROMIA 1+ 03/21/2012 01:08 PM       Lab Results   Component Value Date/Time    ASTSGOT 20 11/15/2024 01:50 PM    ALTSGPT 19 11/15/2024 01:50 PM       Imaging:   I personally reviewed following images, these are my reads  CT head without contrast 11/20/2021: no hemorrhage or evidence for acute infarct.   R knee XR 4/12/24: mod Tricompartment osteoarthritis. Chondrocalcinosis. Popliteal calcification    IMAGING radiology reads. I reviewed the following radiology reads     4/12/2024 11:32 AM     HISTORY/REASON FOR EXAM:  Right knee pain.     TECHNIQUE/EXAM DESCRIPTION AND NUMBER OF VIEWS: 3 views of the RIGHT knee.     COMPARISON: None     FINDINGS:  Bone density is osteopenic There is no evidence of fracture or dislocation. There is tricompartment degenerative change characterized by osteophytic spurring and joint space loss. There is chondrocalcinosis. There is vascular calcification. There is no   joint effusion.     IMPRESSION:     1.  Tricompartment osteoarthritis.     2.  Chondrocalcinosis.                                             ASSESSMENT/PLAN: Av Bob  is a 79y.o. male with rehabilitation history significant for CKD, PVD, Hodgkins lymphoma (+ chemo), pAF (melena with Xarelto), SVT, low T, BPH and rehabilitation history significant for R CVA 12/31/16 with residual left hemiparesis (tx in [place]), general debility  presenting for spasticity management. The following plan was discussed with the patient who is in agreement.     Visit Diagnoses     ICD-10-CM   1. Spasticity   R25.2   2. Hemiplegia and hemiparesis following unspecified cerebrovascular disease affecting left non-dominant side (HCC)  I69.954   3. Contracture of right knee  M24.561   4. Exercise counseling  Z71.82   5. Risk for falls  Z91.81       Wife, Usha, assists with history.     Rehab/Neuro:   1. 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  He is able to put his foot flat on the ground to stand.  It is also easier for him to do ADLs such as dressing due to the increase in flexibility in his shoulder abduction.  -Brace/orthotic/assistive devices: AFO L when exercises and ambulates. No wrist splint as discussed with PT/OT. No new Ads required today    Spasticity secondary to stroke  11/22/2024  improvement of spasticity as sleeping better and less spastic events.  Medication Management: Continue 5 mg baclofen nightly and 2.5 mg PRN  We will proceed with ordering Botox for management of spasticity. The following muscles will be targeted with the corresponding Botox Units.   Botox significantly helped with left elbow pain.  - Referral: Physiatry for repeat Botox injections.  Patient will benefit from Botox injections in conjunction of use with baclofen to Improved ADLs and mobility.  Would benefit from continued Botox injections to capitalize on spasticity control that we have gained thus far.    Okay to continue antiplatelet with plavix through the procedure for botulinum toxin injection.  The risks of going off the medication outweigh the risks of doing the procedure on the medication.  I will plan to use 27-gauge needles and ultrasound guidance to avoid all blood vessels during the procedure.  We discussed that while on anticoagulation the patient does have an increased risk of  bleeding and hematoma     Botox: left upper extremity + lower extremity  Location Botox Amount in Units   Pec sonya and minor 150 units total (3 locations)   Biceps    200 units (4 locations)   Hamstring - semimembranosus 150 units (3 locations)   Hamstring - semitendinosus  150 units (3 locations)   Hamstring - Bicep femoris long head and short head 150 units (3 locations)   Total Units  800 units   Rampart agreement to increase units due to the fact that the patient benefited from 400 units but his/her spasticity is severe enough to warrant additional units for maximal efficacy.     The risks benefits and alternatives to this procedure were discussed and the patient wishes to proceed with the procedure. Risks include but are not limited to damage to surrounding structures, infection, bleeding, worsening of pain which can be permanent, weakness which can be permanent. Benefits include pain relief, improved function. Alternatives includes not doing the procedure.       Guidance: US - For appropriate identification and targeting of spastic muscles to be injected.    Orders Placed This Encounter    Referral to Pain Clinic          Other:   Dietary and exercise counseling provided  Insomnia  At prior visit, right knee contracture. X-ray revealed no acute processes such as fractures but did reveal osteoarthritis; suspect pain is likely due to spasticity and will continue  baclofen and monitor  Past visits: Lumbar radiculopathy and neuropathic pain: Primary transforaminal epidural steroid injections; will continue to monitor    Follow-up: botox injections  Patient expressed understanding of the management plan. Patient (and Family Members) was/were encouraged to call if any worries, issues, problems or concerns prior to the next visit     Please note that this dictation was created using voice recognition software. I have made every reasonable attempt to correct obvious errors but there may be errors of grammar and content  that I may have overlooked prior to finalization of this note.    My total time spent caring for the patient on the day of the encounter was 25 minutes.   This does not include time spent on separately billable procedures/tests.        Brett Cantrell DO  Department of Physical Medicine and Rehabilitation  [facility]         CALVIN Bunch D.O.   CC No ref. provider found

## 2024-12-03 ENCOUNTER — TELEMEDICINE (OUTPATIENT)
Dept: CARDIOLOGY | Facility: MEDICAL CENTER | Age: 79
End: 2024-12-03
Attending: PHYSICIAN ASSISTANT
Payer: MEDICARE

## 2024-12-03 VITALS
BODY MASS INDEX: 25.73 KG/M2 | HEIGHT: 72 IN | HEART RATE: 68 BPM | SYSTOLIC BLOOD PRESSURE: 116 MMHG | WEIGHT: 190 LBS | TEMPERATURE: 97.5 F | DIASTOLIC BLOOD PRESSURE: 65 MMHG | OXYGEN SATURATION: 96 %

## 2024-12-03 DIAGNOSIS — I15.2 HYPERTENSION ASSOCIATED WITH DIABETES (HCC): ICD-10-CM

## 2024-12-03 DIAGNOSIS — E11.69 HYPERLIPIDEMIA ASSOCIATED WITH TYPE 2 DIABETES MELLITUS (HCC): ICD-10-CM

## 2024-12-03 DIAGNOSIS — E83.42 HYPOMAGNESEMIA: ICD-10-CM

## 2024-12-03 DIAGNOSIS — E78.5 HYPERLIPIDEMIA ASSOCIATED WITH TYPE 2 DIABETES MELLITUS (HCC): ICD-10-CM

## 2024-12-03 DIAGNOSIS — E11.59 HYPERTENSION ASSOCIATED WITH DIABETES (HCC): ICD-10-CM

## 2024-12-03 DIAGNOSIS — R54 AGE-RELATED PHYSICAL DEBILITY: ICD-10-CM

## 2024-12-03 DIAGNOSIS — I73.9 PVD (PERIPHERAL VASCULAR DISEASE) (HCC): ICD-10-CM

## 2024-12-03 DIAGNOSIS — I48.0 PAROXYSMAL ATRIAL FIBRILLATION (HCC): ICD-10-CM

## 2024-12-03 DIAGNOSIS — E78.5 DYSLIPIDEMIA: ICD-10-CM

## 2024-12-03 DIAGNOSIS — I25.10 CORONARY ARTERY DISEASE INVOLVING NATIVE CORONARY ARTERY OF NATIVE HEART WITHOUT ANGINA PECTORIS: ICD-10-CM

## 2024-12-03 ASSESSMENT — ENCOUNTER SYMPTOMS
SHORTNESS OF BREATH: 0
WEIGHT LOSS: 0
PALPITATIONS: 0

## 2024-12-03 ASSESSMENT — FIBROSIS 4 INDEX: FIB4 SCORE: 1.45

## 2024-12-03 NOTE — PROGRESS NOTES
Chief Complaint   Patient presents with    Follow-Up     Essential hypertension, benign    Hyperlipidemia    Atrial Fibrillation     Paroxysmal atrial fibrillation (HCC)    This evaluation was conducted via Teams using secure and encrypted videoconferencing technology. The patient was in their home in the [facility].    The patient's identity was confirmed and verbal consent was obtained for this virtual visit.      Subjective:   Av Bob is a 79 y.o. male who presents today for follow-up.    Patient of Dr. Tucker.  Current medical problems include CAD (stents to RCA, LAD, OM over 10 years ago), PAF, DM, CVA 2016 with residual L hemiparesis, HTN, CKD.  He has hyperlipidemia but is above goal on statins, he did not tolerate ezetimibe.    Last few visits I was adjusting his blood pressure medication regimen, and he was achieving readings between 120-130 after changing to olmesartan and increasing the carvedilol.    On 11/15/24 he was lethargic, somnolent and his wife called EMS who recorded a low BP at home and he was transported to the ER and observed overnight. At the hospital he had normotension to elevated BP readings. He had no acute findings on MRI, postural disruption of the intracranial vertebral artery flow in the left, advanced senescent changes. His echo was unremarkable. No medication changes were made.     Av' wife Usha provides most of the history as Av sleeps through the visit.   She has not had any low blood pressure readings since he was discharged.  She monitors his blood pressure in the morning and then after meals and finds he does have a 10-20 point drop after meals with the lowest reading being 93 systolic.  In the evenings (after he has been laying down/resting) his readings are higher, 130-150 systolic. He continues to be fatigued and tired requiring naps throughout the day.  He does continue to work with physical therapy who recommended compression  stockings.      Past Medical History:   Diagnosis Date    ([facility]) Chronic ulcer of right lower extremity with fat layer exposed 12/27/2018    Acute cystitis without hematuria 6/30/2019    Acute deep vein thrombosis (DVT) of RLE and partial DVT of LLE 6/19/2022    US LE (6/2022):   1.  Acute appearing RIGHT posterior tibial vein DVT.  2.  Subacute appearing LEFT common femoral vein DVT and proximal femoral   vein junction DVT.   He had leg pain and ultrasound of bilateral lower extremities was completed which was positive for new DVT, acute in the right posterior tibial vein and subacute in the left common femoral and proximal femoral vein junction as noted    Acute on chronic renal failure (HCC) 1/21/2020    Acute prostatitis 1/13/2022    New problem of prostatitis identified by dispatch health when patient developed hematuria with rectal pain.  He followed up with his urology PA and was placed back on Bactrim.  He was recently on Bactrim and has a history of recurrent urinary tract infections related to neurogenic bladder from prior stroke.  Rectal pain is dissipated however he continues to have hematuria.  He had associated CATA o    Acute respiratory failure with hypoxia ([facility]) 5/20/2017    CAD (coronary artery disease)     GIOVANNA to RCA in '97, GIOVANNA X2 to LAD and GIOVANNA X2 to OM in '09    Cancer ([facility])     2017; chemo lympoma non hodgkins lymphoma    Cataract     dez iol    Cerebrovascular accident (CVA) ([facility]) 12/30/2016    Left arm weakness  etiology of stroke not established, lymphoma discovered on MRI evaluation of stroke, L hemiparesis much worse after acute infectious illness in mid 2017, but no specific diagnosed recurrent neurological etiology, all at [facility], [facility]    Chickenpox     CKD (chronic kidney disease) stage 3, GFR 30-59 ml/min     Controlled gout 2014    Coronary atherosclerosis of native coronary artery     S/P PTCA (percutaneous transluminal coronary angioplasty), RCA,  5/1997, patent on cath 7/10/2009 at the time of interventions on his left anterior descending and circumflex coronary arteries    Daytime sleepiness     Depression     Diabetes (HCC)     Diarrhea 7/2/2019    Difficulty swallowing     Dyschezia 9/22/2022    For the past 6 months or so he has been experiencing significant pain with defecation.  Does not happen every time.  His wife who is his primary caregiver notes there is no hard stool when he has a painful episodes.  He also is having stool incontinence sometimes when he coughs and other times he will pass a full bowel movement and not be aware.  He has not yet seen GI for this problem.    Dysphagia 3/15/2021    He reports progressive worsening of his swallow function.  He notices at least once per week he is choking and coughing, can be with pills or can be with food.  The episodes are quite frightening and he takes a bit of time for him to recover.  He has a prior history of stroke and recalls working with speech-language pathology at that time but does not remember if he did any formal esophagrams or s    Enterococcal septicemia ([facility]) 8/12/2017    Exposure to SARS-associated coronavirus 10/13/2021    He reports viral illness last week with runny nose, congestion, productive cough, and wheezing.  He went to a play of his grandson and may have been exposed to Covid.  He was feeling very bad last Friday and over the weekend and now is at least 50% better.    Roberto hematuria 1/29/2020    Frequent urination     GERD (gastroesophageal reflux disease)     [place] measles     History of renal calculi 11/19/2019    Hordeolum externum of left lower eyelid 9/14/2021    He reports to clinic with 3 weeks of erythema and pain of the left lower eyelid. No clear inciting cause. Reports no globe pain. Lower > upper eyelid swelling and erythema. After 1.5 weeks had drainage of purulence from the lower medial eyelid. No photosensitivity. Using warm compresses. No changes in visual  acuity. Saw retinal specialist around day 1 or 2 of symptoms who felt it could be related     Hospital discharge follow-up 6/27/2022    Av was admitted to the hospital from 5/28 to 5/31/2022 for weakness and sepsis related to urinary tract infection.  [facility] to try to improve mobility and ADLs due to prior stroke.  This was complicated by C. difficile infection as well as lower extremity DVTs.  Diarrhea resolved with oral vancomycin.  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 Barry    Normocytic hypochromic anemia 5/20/2017    NSTEMI (non-ST elevated myocardial infarction) ([facility]) 07/18/2017    complicating UTI with sepsis    Pain 06/30/2021    right leg foot    Peripheral vascular disease ([facility])     Polyneuropathy in diabetes(357.2) 9/11/2013    Pyelonephritis 5/24/2022    Av had abrupt onset of illness starting on Sunday.  He felt unwell and then had projectile vomiting x3 episodes after dinner that evening.  He had associated nausea but no overt abdominal pain.  He has continued to have anorexia and is having difficulty with his p.o. intake.  He did get in some fruit in a month and yesterday and about 50 to 60 ounces of water.  He has chronic urinary retention    Renal cyst 1/29/2020    Renal mass 1/21/2020    Septic shock ([facility]) 5/20/2017    Skin ulcer of calf ([facility]) 2015    Right, Dr. Terry and wound care    Stage 3b  chronic kidney disease 8/25/2016     Latest Reference Range & Units 04/29/22 11:14 Bun 8 - 22 mg/dL 33 (H) Creatinine 0.50 - 1.40 mg/dL 1.55 (H) GFR (CKD-EPI) >60 mL/min/1.73 m 2 46 ! [1]  He has a history of chronic kidney disease related to nephrolithiasis, recurrent UTIs, and BPH as well as history of hypertension and diabetes.  He is following with nephrology, Dr. Jarvis and urology, Dr. Barry.  Spironolactone was discontinued due     Stroke (HCC) 2016    left sided weakness    Toenail avulsion, initial encounter- left foot 3rd digit 7/12/2021    They report that his toenail spontaneously came off last week.  He does not recall specific injury or any pain.  His significant other saw the nail on the floor with dried blood on his foot.  She cleaned the wound and covered it with gauze.  On follow-up in clinic today the gauze has stuck to the wound bed but with saline was fairly easy to remove the dried gauze.  There was scant amount of bright    Urinary bladder disorder     Urinary incontinence     pt wears pads    UTI (urinary tract infection) 5/28/2022    Weakness     Wound of left leg 2012    Requiring surgery and debridment, Dr. Moore         Family History   Problem Relation Age of Onset    Heart Disease Father         CAD    Diabetes Father     Cancer Mother     Psychiatric Illness Mother         Depression    Depression Mother     Kidney stones Brother     Heart Disease Brother     Psychiatric Illness Brother         Depression    Depression Brother     Suicide Attempts Other     Psychiatric Illness Other         autism         Social History     Tobacco Use    Smoking status: Never    Smokeless tobacco: Never   Vaping Use    Vaping status: Never Used   Substance Use Topics    Alcohol use: Never    Drug use: Never         Allergies   Allergen Reactions    Diphenhydramine Anxiety     Pt is able to tolerate  Mg benadryl with less anxiety    Lorazepam Unspecified     Disorientation    Spironolactone Unspecified      Acute kidney injury    Ciprofloxacin Rash     Rash,stomach ache         Current Outpatient Medications   Medication Sig    baclofen (LIORESAL) 5 MG Tab TAKE 1 TABLET BY MOUTH EVERY EVENING. MAY CONSIDER 1/2 TABLET BY MOUTH AS NEEDED    amLODIPine (NORVASC) 5 MG Tab Take 1 Tablet by mouth every day.    acetaminophen (TYLENOL) 500 MG Tab Take 500-1,000 mg by mouth every 6 hours as needed. Indications: Pain    Continuous Glucose Sensor (FREESTYLE LLOYD 3 PLUS SENSOR) Misc 1 Each see administration instructions. [place] 3 CGM 15 day sensors    allopurinol (ZYLOPRIM) 100 MG Tab Take 1 Tablet by mouth every evening.    olmesartan (BENICAR) 40 MG Tab TAKE 1 TABLET BY MOUTH EVERY EVENING    Insulin Glargine, 1 Unit Dial, (TOUJEO SOLOSTAR) 300 UNIT/ML Solution Pen-injector Inject 21-36 Units under the skin every day. (Patient taking differently: Inject 5-17 Units under the skin every day.)    glucose blood (ONETOUCH ULTRA) strip 1 Strip by Other route 4 Times a Day,Before Meals and at Bedtime.    Potassium Chloride CR (MICRO-K) 8 MEQ Cap CR capsule TAKE 1 CAPSULE BY MOUTH EVERY 48 HOURS    clopidogrel (PLAVIX) 75 MG Tab TAKE 1 TABLET BY MOUTH EVERY DAY    atorvastatin (LIPITOR) 80 MG tablet TAKE 1 TABLET BY MOUTH EVERY EVENING    HUMALOG KWIKPEN 100 UNIT/ML Solution Pen-injector injection PEN INJECT 5 UNITS UNDER SKIN 3 TIMES A DAY BEFORE MEALS. 5 UNITS FOR SUGARS= 101-150. INCREASE BY 2 UNITS IF>150. CORRECTION DOSAGE IS 2:50>150 (Patient taking differently: Inject 5-7 Units under the skin 3 times a day before meals. INJECT 5 UNITS UNDER SKIN 3 TIMES A DAY BEFORE MEALS. 5 UNITS FOR SUGARS= 101-150. INCREASE BY 2 UNITS IF>150. CORRECTION DOSAGE IS 2:50>150)    fluoxetine (PROZAC) 40 MG capsule TAKE 1 CAPSULE BY MOUTH EVERY DAY    carvedilol (COREG) 6.25 MG Tab Take 1 Tablet by mouth 2 times a day with meals.    Insulin Pen Needle (PEN NEEDLES) 32G X 4 MM Misc 1 Each 5 Times a Day. USE FOR INSULIN SHOTS FIVE TIMES DAILY     Probiotic Product (PROBIOTIC DAILY) Cap Take 1 Capsule by mouth in the morning, at noon, and at bedtime.    finasteride (PROSCAR) 5 MG Tab Take 5 mg by mouth every evening.    Cholecalciferol (VITAMIN D) 2000 UNIT Tab Take 2,000 Units by mouth every morning.    magnesium oxide (MAG-OX) 400 MG Tab tablet Take 800 mg by mouth 2 times a day.    omeprazole (PRILOSEC) 20 MG delayed-release capsule Take 1 Capsule by mouth every day.         Review of Systems   Constitutional:  Positive for malaise/fatigue. Negative for weight loss.   Respiratory:  Negative for shortness of breath.    Cardiovascular:  Negative for chest pain and palpitations.          Objective:   /65 (BP Location: Right arm, Patient Position: Supine, BP Cuff Size: Adult)   Pulse 68   Temp 36.4 °C (97.5 °F)   Ht 1.829 m (6')   Wt 86.2 kg (190 lb)   SpO2 96%  Body mass index is 25.77 kg/m².         Physical Exam  Vitals (vitals signs taken by patient at home) reviewed.   Constitutional:       General: He is not in acute distress.  HENT:      Head: Normocephalic and atraumatic.   Pulmonary:      Effort: No respiratory distress.   Skin:     Coloration: Skin is not pale.   Psychiatric:         Behavior: Behavior normal.         Judgment: Judgment normal.       TTE 11/15/24  CONCLUSIONS  Normal left ventricular systolic function.  Moderate concentric left ventricular hypertrophy.  Aortic valve sclerosis without stenosis.  Normal right ventricular systolic function.  Compared to the report of the prior study 6/11/2022 , there has been no   significant change.     Brain MR 11/15/24  IMPRESSION:     1.  No evidence of acute ischemia, hemorrhage or mass  2.  At least partial loss of the LEFT intracranial vertebral artery flow void is suspected to be related to the motion artifact on this exam however slow flow or even occlusion are not excluded by this study. Consider CTA of the head and neck to further   assess if clinically appropriate.  3.   Advanced senescent changes  4.  Areas of remote lacunar infarction in the BILATERAL supratentorial brain     Assessment:     1. Paroxysmal atrial fibrillation (HCC)  EKG      2. (HCC) Hypertension associated with diabetes        3. Coronary artery disease involving native coronary artery of native heart without angina pectoris        4. (HCC) Hyperlipidemia associated with type 2 diabetes mellitus        5. Hypomagnesemia        6. Age-related physical debility        7. Dyslipidemia        8. PVD (peripheral vascular disease) (HCC)               Medical Decision Making:  Today's Assessment / Plan:   Hypotension  - There could be a post prandial component to this however I also think given his diabetes and extensive brain infarcts there is likely a dysautonomia component to it. He also has atherosclerosis and CAD and possible vertebral artery stenosis on his recent brain MR which would be difficult to treat. He remains on antiplatelet and statin therapy.  - He has bacturia which his primary team has elected not to treat    Dyslipidemia  CAD s/p remote stenting to  RCA, LAD, OM, last in 2009.   - no angina symptoms, is mostly in wheel chair, although does work with PT.   -Continue clopidogrel 75  - LDL is above goal of 70 with high dose atorva.  He did not tolerate the addition of ezetimibe.  At this point he is not interested in any further cholesterol medication such as bempedoic acid or PCSK9 inhibitors as he is concerned about polypharmacy in his poor tolerance to many medications.       Essential hypertension, benign  - Now with labile hypertension  -Continue olmesartan, amlodipine at 5mg dose (reduced from 10mg) and carvedilol. Stop carvedilol for further symptomatic hypotension     Paroxysmal atrial fibrillation in setting of sepsis/hospitalization   - ?single documented episode, I went back in his chart to 2017 and did not find if this was in conjunction with his CVA/TIA  - see Dr. Tucker's note for previous  discussion of anticoagulation/GI bleeds.  We again discussed anticoagulation for stroke prevention and again Av is not interested in this or any procedures     CKD stage 2  -followed by Dr. Jarvis.       Cerebrovascular accident (CVA) due to embolism of cerebral artery   - single antiplatelet agent, statin therapy, blood pressure control     Hypomagnesemia  -on high dose mag oxide       Return in about 6 months (around 6/3/2025) for Dr Myles in , new to Dr. Myles, prior JM.

## 2024-12-04 ENCOUNTER — PATIENT MESSAGE (OUTPATIENT)
Dept: CARDIOLOGY | Facility: MEDICAL CENTER | Age: 79
End: 2024-12-04
Payer: MEDICARE

## 2024-12-04 DIAGNOSIS — I10 ESSENTIAL HYPERTENSION, BENIGN: ICD-10-CM

## 2024-12-04 RX ORDER — CARVEDILOL 6.25 MG/1
6.25 TABLET ORAL 2 TIMES DAILY WITH MEALS
Qty: 180 TABLET | Refills: 3 | Status: SHIPPED | OUTPATIENT
Start: 2024-12-04 | End: 2024-12-31

## 2024-12-04 NOTE — PATIENT COMMUNICATION
Medication sent to preferred pharmacy as requested.    Replied to pt via Xtract[employer] regarding findings.    ===================    Reviewed JA last OV note:     Essential hypertension, benign  - Now with labile hypertension  -Continue olmesartan, amlodipine at 5mg dose (reduced from 10mg) and carvedilol. Stop carvedilol for further symptomatic hypotension

## 2024-12-16 ENCOUNTER — PATIENT MESSAGE (OUTPATIENT)
Dept: CARDIOLOGY | Facility: MEDICAL CENTER | Age: 79
End: 2024-12-16

## 2024-12-16 ENCOUNTER — PATIENT OUTREACH (OUTPATIENT)
Dept: HEALTH INFORMATION MANAGEMENT | Facility: OTHER | Age: 79
End: 2024-12-16
Payer: MEDICARE

## 2024-12-16 DIAGNOSIS — E11.22 TYPE 2 DIABETES MELLITUS WITH STAGE 3B CHRONIC KIDNEY DISEASE, WITH LONG-TERM CURRENT USE OF INSULIN (HCC): ICD-10-CM

## 2024-12-16 DIAGNOSIS — N18.32 TYPE 2 DIABETES MELLITUS WITH STAGE 3B CHRONIC KIDNEY DISEASE, WITH LONG-TERM CURRENT USE OF INSULIN (HCC): ICD-10-CM

## 2024-12-16 DIAGNOSIS — E11.59 HYPERTENSION ASSOCIATED WITH DIABETES (HCC): ICD-10-CM

## 2024-12-16 DIAGNOSIS — Z79.4 TYPE 2 DIABETES MELLITUS WITH STAGE 3B CHRONIC KIDNEY DISEASE, WITH LONG-TERM CURRENT USE OF INSULIN (HCC): ICD-10-CM

## 2024-12-16 DIAGNOSIS — I15.2 HYPERTENSION ASSOCIATED WITH DIABETES (HCC): ICD-10-CM

## 2024-12-16 NOTE — PROGRESS NOTES
Nurse FREDO received incoming call from pt's spouse, Usha Bob.     Assessment    Spouse called nurse FREDO to report pt has been having ongoing problem of fatigue, weakness and feeling lethargic.  Reports symptoms are worse after breakfast and with lower blood pressure readings. Spouse reports on 12/9 blood pressure was 142/86 before breakfast and dropped to 90/62 after eating.  Reports yesterday morning blood pressure was 136/90 before breakfast and dropped to 105/65 after eating. Reports dinner last night 167/92 before, 127/85 after meal. Spouse reports amlodipine and carvedilol have been on hold since 12/11/24.  Pt continues to take olmesartan 40 mg every evening.  Spouse also reports symptoms appear to be worse after pt has a BM.     Pt has history of DM.  Spouse reports blood sugars have been in good range. Reports she has noticed with Leanna 3 that she gets lower readings compared to doing fingerstick with their glucometer. Reports occasional low reading of around 67. Reports overall blood sugars in good range.     Education and Self-Management of Care  Follow-up with cardiology. Pt to bring blood pressure monitor to next appt to check monitor. Continue to monitor blood sugar readings. Stay well hydrated.     Plan of Care and Goals    Reviewed care plan and goals.  Nurse spoke to PCP and provided update of pt symptoms and blood pressure readings. Recommend follow-up with cardiology.     Barriers:    Weakness, chronic health conditions    Progress:    No change in current progress. Per spouse pt feeling weaker.     Next outreach: One month  Nurse Care Coordinator:  Camilla Navarro  [phone]

## 2024-12-16 NOTE — CARE PLAN
Problem: Knowledge deficit of diabetes  Goal: Patient educated about diabetes/long term goal  Outcome: Progressing     Problem: Patient barriers to managing diabetes  Goal: Identify patient barriers to managing diabetes/long term goal  Outcome: Progressing     Problem: Knowledge deficit of self-monitoring blood sugars  Goal: Ensure patient has had proper education on self glucose testing/long term goal  Outcome: Progressing

## 2024-12-30 NOTE — PROGRESS NOTES
Chief Complaint   Patient presents with    Hypertension     Virtual follow up    This evaluation was conducted via Teams using secure and encrypted videoconferencing technology. The patient was in their home in the state of [place].    The patient's identity was confirmed and verbal consent was obtained for this virtual visit.        Subjective:   Av Bob is a 79 y.o. male who presents today for follow-up.    Patient of Dr. Tucker.  Current medical problems include CAD (stents to RCA, LAD, OM over 10 years ago), PAF, DM, CVA 2016 with residual L hemiparesis, HTN, CKD.  He has hyperlipidemia but is above goal on statins, he did not tolerate ezetimibe.    Last few visits I was adjusting his blood pressure medication regimen, and he was achieving readings between 120-130 after changing to olmesartan and increasing the carvedilol.    On 11/15/24 he was lethargic, somnolent and his wife called EMS who recorded a low BP at home and he was transported to the ER and observed overnight. At the hospital he had normotension to elevated BP readings. He had no acute findings on MRI, postural disruption of the intracranial vertebral artery flow in the left, advanced senescent changes. His echo was unremarkable. No medication changes were made.     At last visit he we reduced his antihypertensives for labile blood pressures.     Today is a 1 month follow up. He is sitting up and alert during this visit. Usha reports improved BP control over the last few days. They have stopped the amlodipine and carvedilol completely. He is taking olmesartan 20mg around noon (after first meal of day) and the 20mg after his evening meal. Usha has also decreased the amount of food he eats for breakfast and he is eating mostly protein and healthy fats, minimal carbohydrates, although he does drink juice.     High BPs are about 140, 1 reading was 160, low readings are over 100 in the last few days.     Past Medical History:   Diagnosis  Date    ([facility]) Chronic ulcer of right lower extremity with fat layer exposed 12/27/2018    Acute cystitis without hematuria 6/30/2019    Acute deep vein thrombosis (DVT) of RLE and partial DVT of LLE 6/19/2022    US LE (6/2022):   1.  Acute appearing RIGHT posterior tibial vein DVT.  2.  Subacute appearing LEFT common femoral vein DVT and proximal femoral   vein junction DVT.   He had leg pain and ultrasound of bilateral lower extremities was completed which was positive for new DVT, acute in the right posterior tibial vein and subacute in the left common femoral and proximal femoral vein junction as noted    Acute on chronic renal failure ([facility]) 1/21/2020    Acute prostatitis 1/13/2022    New problem of prostatitis identified by dispatch health when patient developed hematuria with rectal pain.  He followed up with his urology PA and was placed back on Bactrim.  He was recently on Bactrim and has a history of recurrent urinary tract infections related to neurogenic bladder from prior stroke.  Rectal pain is dissipated however he continues to have hematuria.  He had associated CATA o    Acute respiratory failure with hypoxia ([facility]) 5/20/2017    CAD (coronary artery disease)     GIOVANNA to RCA in '97, GIOVANNA X2 to LAD and GIOVANNA X2 to OM in '09    Cancer ([facility])     2017; chemo lympoma non hodgkins lymphoma    Cataract     dez iol    Cerebrovascular accident (CVA) ([facility]) 12/30/2016    Left arm weakness  etiology of stroke not established, lymphoma discovered on MRI evaluation of stroke, L hemiparesis much worse after acute infectious illness in mid 2017, but no specific diagnosed recurrent neurological etiology, all at [facility], [facility]    Chickenpox     CKD (chronic kidney disease) stage 3, GFR 30-59 ml/min     Controlled gout 2014    Coronary atherosclerosis of native coronary artery     S/P PTCA (percutaneous transluminal coronary angioplasty), RCA, 5/1997, patent on cath 7/10/2009 at the time of  interventions on his left anterior descending and circumflex coronary arteries    Daytime sleepiness     Depression     Diabetes (HCC)     Diarrhea 7/2/2019    Difficulty swallowing     Dyschezia 9/22/2022    For the past 6 months or so he has been experiencing significant pain with defecation.  Does not happen every time.  His wife who is his primary caregiver notes there is no hard stool when he has a painful episodes.  He also is having stool incontinence sometimes when he coughs and other times he will pass a full bowel movement and not be aware.  He has not yet seen GI for this problem.    Dysphagia 3/15/2021    He reports progressive worsening of his swallow function.  He notices at least once per week he is choking and coughing, can be with pills or can be with food.  The episodes are quite frightening and he takes a bit of time for him to recover.  He has a prior history of stroke and recalls working with speech-language pathology at that time but does not remember if he did any formal esophagrams or s    Enterococcal septicemia ([facility]) 8/12/2017    Exposure to SARS-associated coronavirus 10/13/2021    He reports viral illness last week with runny nose, congestion, productive cough, and wheezing.  He went to a play of his grandson and may have been exposed to Covid.  He was feeling very bad last Friday and over the weekend and now is at least 50% better.    Roberto hematuria 1/29/2020    Frequent urination     GERD (gastroesophageal reflux disease)     Lao measles     History of renal calculi 11/19/2019    Hordeolum externum of left lower eyelid 9/14/2021    He reports to clinic with 3 weeks of erythema and pain of the left lower eyelid. No clear inciting cause. Reports no globe pain. Lower > upper eyelid swelling and erythema. After 1.5 weeks had drainage of purulence from the lower medial eyelid. No photosensitivity. Using warm compresses. No changes in visual acuity. Saw retinal specialist around day 1 or  2 of symptoms who felt it could be related     Hospital discharge follow-up 6/27/2022    Av was admitted to the hospital from 5/28 to 5/31/2022 for weakness and sepsis related to urinary tract infection.  [facility] to try to improve mobility and ADLs due to prior stroke.  This was complicated by C. difficile infection as well as lower extremity DVTs.  Diarrhea resolved with oral vancomycin.  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 Barry    Normocytic hypochromic anemia 5/20/2017    NSTEMI (non-ST elevated myocardial infarction) ([facility]) 07/18/2017    complicating UTI with sepsis    Pain 06/30/2021    right leg foot    Peripheral vascular disease ([facility])     Polyneuropathy in diabetes(357.2) 9/11/2013    Pyelonephritis 5/24/2022    Av had abrupt onset of illness starting on Sunday.  He felt unwell and then had projectile vomiting x3 episodes after dinner that evening.  He had associated nausea but no overt abdominal pain.  He has continued to have anorexia and is having difficulty with his p.o. intake.  He did get in some fruit in a month and yesterday and about 50 to 60 ounces of water.  He has chronic urinary retention    Renal cyst 1/29/2020    Renal mass 1/21/2020    Septic shock ([facility]) 5/20/2017    Skin ulcer of calf ([facility]) 2015    Right, Dr. Terry and wound care    Stage 3b chronic kidney disease 8/25/2016     Latest  Reference Range & Units 04/29/22 11:14 Bun 8 - 22 mg/dL 33 (H) Creatinine 0.50 - 1.40 mg/dL 1.55 (H) GFR (CKD-EPI) >60 mL/min/1.73 m 2 46 ! [1]  He has a history of chronic kidney disease related to nephrolithiasis, recurrent UTIs, and BPH as well as history of hypertension and diabetes.  He is following with nephrology, Dr. Jarvis and urology, Dr. Barry.  Spironolactone was discontinued due     Stroke (HCC) 2016    left sided weakness    Toenail avulsion, initial encounter- left foot 3rd digit 7/12/2021    They report that his toenail spontaneously came off last week.  He does not recall specific injury or any pain.  His significant other saw the nail on the floor with dried blood on his foot.  She cleaned the wound and covered it with gauze.  On follow-up in clinic today the gauze has stuck to the wound bed but with saline was fairly easy to remove the dried gauze.  There was scant amount of bright    Urinary bladder disorder     Urinary incontinence     pt wears pads    UTI (urinary tract infection) 5/28/2022    Weakness     Wound of left leg 2012    Requiring surgery and debridment, Dr. Moore         Family History   Problem Relation Age of Onset    Heart Disease Father         CAD    Diabetes Father     Cancer Mother     Psychiatric Illness Mother         Depression    Depression Mother     Kidney stones Brother     Heart Disease Brother     Psychiatric Illness Brother         Depression    Depression Brother     Suicide Attempts Other     Psychiatric Illness Other         autism         Social History     Tobacco Use    Smoking status: Never    Smokeless tobacco: Never   Vaping Use    Vaping status: Never Used   Substance Use Topics    Alcohol use: Never    Drug use: Never         Allergies   Allergen Reactions    Diphenhydramine Anxiety     Pt is able to tolerate  Mg benadryl with less anxiety    Lorazepam Unspecified     Disorientation    Spironolactone Unspecified     Acute kidney injury    Ciprofloxacin Rash      Rash,stomach ache         Current Outpatient Medications   Medication Sig    BOTOX 200 units Recon Soln injection     multivitamin Tab Take 1 Tablet by mouth every 48 hours.    baclofen (LIORESAL) 5 MG Tab TAKE 1 TABLET BY MOUTH EVERY EVENING. MAY CONSIDER 1/2 TABLET BY MOUTH AS NEEDED    acetaminophen (TYLENOL) 500 MG Tab Take 500-1,000 mg by mouth every 6 hours as needed. Indications: Pain    allopurinol (ZYLOPRIM) 100 MG Tab Take 1 Tablet by mouth every evening.    olmesartan (BENICAR) 40 MG Tab TAKE 1 TABLET BY MOUTH EVERY EVENING    Insulin Glargine, 1 Unit Dial, (TOUJEO SOLOSTAR) 300 UNIT/ML Solution Pen-injector Inject 21-36 Units under the skin every day. (Patient taking differently: Inject 5-17 Units under the skin every day.)    Potassium Chloride CR (MICRO-K) 8 MEQ Cap CR capsule TAKE 1 CAPSULE BY MOUTH EVERY 48 HOURS    clopidogrel (PLAVIX) 75 MG Tab TAKE 1 TABLET BY MOUTH EVERY DAY    atorvastatin (LIPITOR) 80 MG tablet TAKE 1 TABLET BY MOUTH EVERY EVENING    HUMALOG KWIKPEN 100 UNIT/ML Solution Pen-injector injection PEN INJECT 5 UNITS UNDER SKIN 3 TIMES A DAY BEFORE MEALS. 5 UNITS FOR SUGARS= 101-150. INCREASE BY 2 UNITS IF>150. CORRECTION DOSAGE IS 2:50>150 (Patient taking differently: Inject 5-7 Units under the skin 3 times a day before meals. INJECT 5 UNITS UNDER SKIN 3 TIMES A DAY BEFORE MEALS. 5 UNITS FOR SUGARS= 101-150. INCREASE BY 2 UNITS IF>150. CORRECTION DOSAGE IS 2:50>150)    fluoxetine (PROZAC) 40 MG capsule TAKE 1 CAPSULE BY MOUTH EVERY DAY    Probiotic Product (PROBIOTIC DAILY) Cap Take 1 Capsule by mouth in the morning, at noon, and at bedtime.    finasteride (PROSCAR) 5 MG Tab Take 5 mg by mouth every evening.    Cholecalciferol (VITAMIN D) 2000 UNIT Tab Take 2,000 Units by mouth every morning.    magnesium oxide (MAG-OX) 400 MG Tab tablet Take 800 mg by mouth 2 times a day.    omeprazole (PRILOSEC) 20 MG delayed-release capsule Take 1 Capsule by mouth every day.    Continuous  Glucose Sensor (FREESTYLE LLODY 3 PLUS SENSOR) Misc 1 Each see administration instructions. [place] 3 CGM 15 day sensors    glucose blood (ONETOUCH ULTRA) strip 1 Strip by Other route 4 Times a Day,Before Meals and at Bedtime.    Insulin Pen Needle (PEN NEEDLES) 32G X 4 MM Misc 1 Each 5 Times a Day. USE FOR INSULIN SHOTS FIVE TIMES DAILY         Review of Systems   Constitutional:  Positive for malaise/fatigue.   Respiratory:  Negative for cough and shortness of breath.    Cardiovascular:  Negative for chest pain and palpitations.   Neurological:  Negative for dizziness.          Objective:   /89 (BP Location: Right arm, Patient Position: Sitting)   Pulse 84   Ht 1.829 m (6')   Wt 86.2 kg (190 lb)   SpO2 97%  Body mass index is 25.77 kg/m².         Physical Exam  Vitals (vitals signs taken by patient at home) reviewed.   Constitutional:       General: He is not in acute distress.  HENT:      Head: Normocephalic and atraumatic.   Pulmonary:      Effort: No respiratory distress.   Skin:     Coloration: Skin is not pale.   Psychiatric:         Behavior: Behavior normal.         Judgment: Judgment normal.       TTE 11/15/24  CONCLUSIONS  Normal left ventricular systolic function.  Moderate concentric left ventricular hypertrophy.  Aortic valve sclerosis without stenosis.  Normal right ventricular systolic function.  Compared to the report of the prior study 6/11/2022 , there has been no   significant change.     Brain MR 11/15/24  IMPRESSION:     1.  No evidence of acute ischemia, hemorrhage or mass  2.  At least partial loss of the LEFT intracranial vertebral artery flow void is suspected to be related to the motion artifact on this exam however slow flow or even occlusion are not excluded by this study. Consider CTA of the head and neck to further   assess if clinically appropriate.  3.  Advanced senescent changes  4.  Areas of remote lacunar infarction in the BILATERAL supratentorial brain     Assessment:      1. (HCC) Hypertension associated with diabetes        2. (HCC) Hyperlipidemia associated with type 2 diabetes mellitus        3. PVD (peripheral vascular disease) ([facility])        4. Presence of drug coated stents in left circumflex coronary artery        5. Age-related physical debility        6. Coronary artery disease involving native coronary artery of native heart without angina pectoris        7. Paroxysmal atrial fibrillation (HCC)        8. (HCC) Left hemiparesis        9. Altered mobility due to old stroke        10. Polypharmacy        11. Type 2 diabetes mellitus with stage 2 chronic kidney disease, with long-term current use of insulin ([facility])                 Medical Decision Making:  Today's Assessment / Plan:   Hypotension  - There could be a post prandial component and also given his diabetes and extensive brain infarcts there is likely a dysautonomia component to it. He also has atherosclerosis and CAD and possible vertebral artery stenosis on his recent brain MR which would be difficult to treat. He remains on antiplatelet and statin therapy.  - He has no symptoms of arrhythmia and they report normal heart rate readings on the home BP machine.     Dyslipidemia  CAD s/p remote stenting to  RCA, LAD, OM, last in 2009.   - no angina symptoms, is mostly in wheel chair, although does work with PT.   -Continue clopidogrel 75  - LDL is above goal of 70 with high dose atorva.  He did not tolerate the addition of ezetimibe.  At this point he is not interested in any further cholesterol medication such as bempedoic acid or PCSK9 inhibitors as he is concerned about polypharmacy in his poor tolerance to many medications.       Essential hypertension, benign  - Now with labile hypertension  -Continue olmesartan 20mg at noon and 20mg around 5. If BP is too low then stop the evening dose.   Has recently stopped amlodipine at 5mg and carvedilol.      Paroxysmal atrial fibrillation in setting of sepsis/hospitalization    - ?single documented episode, I went back in his chart to 2017 and did not find if this was in conjunction with his CVA/TIA  - see Dr. Tucker's note for previous discussion of anticoagulation/GI bleeds.  We again discussed anticoagulation for stroke prevention and again Av is not interested in this or any procedures     CKD stage 2  -followed by Dr. Jarvis.       Cerebrovascular accident (CVA) due to embolism of cerebral artery   - single antiplatelet agent, statin therapy, blood pressure control     Hypomagnesemia  -on high dose mag oxide       No follow-ups on file.

## 2024-12-31 ENCOUNTER — TELEMEDICINE (OUTPATIENT)
Dept: CARDIOLOGY | Facility: MEDICAL CENTER | Age: 79
End: 2024-12-31
Attending: PHYSICIAN ASSISTANT
Payer: MEDICARE

## 2024-12-31 VITALS
DIASTOLIC BLOOD PRESSURE: 89 MMHG | BODY MASS INDEX: 25.73 KG/M2 | HEART RATE: 84 BPM | WEIGHT: 190 LBS | SYSTOLIC BLOOD PRESSURE: 139 MMHG | HEIGHT: 72 IN | OXYGEN SATURATION: 97 %

## 2024-12-31 DIAGNOSIS — I48.0 PAROXYSMAL ATRIAL FIBRILLATION (HCC): ICD-10-CM

## 2024-12-31 DIAGNOSIS — N18.2 TYPE 2 DIABETES MELLITUS WITH STAGE 2 CHRONIC KIDNEY DISEASE, WITH LONG-TERM CURRENT USE OF INSULIN (HCC): ICD-10-CM

## 2024-12-31 DIAGNOSIS — R26.9 ALTERED MOBILITY DUE TO OLD STROKE: ICD-10-CM

## 2024-12-31 DIAGNOSIS — I73.9 PVD (PERIPHERAL VASCULAR DISEASE) (HCC): ICD-10-CM

## 2024-12-31 DIAGNOSIS — G81.94 LEFT HEMIPARESIS (HCC): ICD-10-CM

## 2024-12-31 DIAGNOSIS — Z95.5 PRESENCE OF DRUG COATED STENT IN LEFT CIRCUMFLEX CORONARY ARTERY: ICD-10-CM

## 2024-12-31 DIAGNOSIS — Z79.899 POLYPHARMACY: ICD-10-CM

## 2024-12-31 DIAGNOSIS — I69.398 ALTERED MOBILITY DUE TO OLD STROKE: ICD-10-CM

## 2024-12-31 DIAGNOSIS — I15.2 HYPERTENSION ASSOCIATED WITH DIABETES (HCC): ICD-10-CM

## 2024-12-31 DIAGNOSIS — E78.5 HYPERLIPIDEMIA ASSOCIATED WITH TYPE 2 DIABETES MELLITUS (HCC): ICD-10-CM

## 2024-12-31 DIAGNOSIS — E11.69 HYPERLIPIDEMIA ASSOCIATED WITH TYPE 2 DIABETES MELLITUS (HCC): ICD-10-CM

## 2024-12-31 DIAGNOSIS — E11.22 TYPE 2 DIABETES MELLITUS WITH STAGE 2 CHRONIC KIDNEY DISEASE, WITH LONG-TERM CURRENT USE OF INSULIN (HCC): ICD-10-CM

## 2024-12-31 DIAGNOSIS — I25.10 CORONARY ARTERY DISEASE INVOLVING NATIVE CORONARY ARTERY OF NATIVE HEART WITHOUT ANGINA PECTORIS: ICD-10-CM

## 2024-12-31 DIAGNOSIS — R54 AGE-RELATED PHYSICAL DEBILITY: ICD-10-CM

## 2024-12-31 DIAGNOSIS — Z79.4 TYPE 2 DIABETES MELLITUS WITH STAGE 2 CHRONIC KIDNEY DISEASE, WITH LONG-TERM CURRENT USE OF INSULIN (HCC): ICD-10-CM

## 2024-12-31 DIAGNOSIS — E11.59 HYPERTENSION ASSOCIATED WITH DIABETES (HCC): ICD-10-CM

## 2024-12-31 RX ORDER — ONABOTULINUMTOXINA 200 [USP'U]/1
INJECTION, POWDER, LYOPHILIZED, FOR SOLUTION INTRADERMAL; INTRAMUSCULAR
COMMUNITY
Start: 2024-12-17

## 2024-12-31 ASSESSMENT — FIBROSIS 4 INDEX: FIB4 SCORE: 1.45

## 2024-12-31 ASSESSMENT — ENCOUNTER SYMPTOMS
PALPITATIONS: 0
DIZZINESS: 0
COUGH: 0
SHORTNESS OF BREATH: 0

## 2025-01-21 ENCOUNTER — PATIENT OUTREACH (OUTPATIENT)
Dept: HEALTH INFORMATION MANAGEMENT | Facility: OTHER | Age: 80
End: 2025-01-21
Payer: MEDICARE

## 2025-01-21 DIAGNOSIS — Z79.4 TYPE 2 DIABETES MELLITUS WITH STAGE 3B CHRONIC KIDNEY DISEASE, WITH LONG-TERM CURRENT USE OF INSULIN (HCC): ICD-10-CM

## 2025-01-21 DIAGNOSIS — E11.59 HYPERTENSION ASSOCIATED WITH DIABETES (HCC): ICD-10-CM

## 2025-01-21 DIAGNOSIS — I15.2 HYPERTENSION ASSOCIATED WITH DIABETES (HCC): ICD-10-CM

## 2025-01-21 DIAGNOSIS — N18.32 TYPE 2 DIABETES MELLITUS WITH STAGE 3B CHRONIC KIDNEY DISEASE, WITH LONG-TERM CURRENT USE OF INSULIN (HCC): ICD-10-CM

## 2025-01-21 DIAGNOSIS — E11.22 TYPE 2 DIABETES MELLITUS WITH STAGE 3B CHRONIC KIDNEY DISEASE, WITH LONG-TERM CURRENT USE OF INSULIN (HCC): ICD-10-CM

## 2025-01-21 NOTE — PROGRESS NOTES
1/21/25 2:15 pm: Nurse CM outreach call for monthly CCM assessment.  Spouse reports they are currently busy. Requesting nurse call back tomorrow.     1/22/25 4:10 pm: Nurse CM outreach call to pt for monthly CCM assessment.  Nurse spoke to pt's spouse, Usha Bob.    Reason for Follow-Up:    Monthly outreach call for personal care management services.    Annual review completed. Spouse reports they would like to continue to follow in personal care management services. Pt has history of multiple chronic health conditions including DM.  Care plan updated.     Current Health Status:    Pt following in personal care management program for history of DM, HTN, and hyperlipidemia. Spouse reports pt has been doing a little better this month.    Medical Updates:  Spouse reports pt's blood pressure has been doing better. Reports not as many lows after eating breakfast. Reports today reading before breakfast was 117/74, reports after eating reading was 92/66. Reports pt was feeling okay. Reports readings go up when pt is lying down. Spouse reports pt is able to transfer with assistance. Pt is currently taking olmesartan 20 mg once a day. Reports pt takes around 11-12:00 am.     Spouse reports blood sugars have been in a good range. Reports fasting blood sugar generally under 100. Pt continues to take Toujeo and humalog insulin. Reports occasional low around 67. Spouse will give pt orange juice if low reading. Pt using a [employer] CGMS and also spouse will use glucometer to check readings.     Medication Updates:  Reviewed medication list. Spouse reports pt is no longer taking amlodipine or carvedilol.     Symptoms:  No active symptoms reported    Plan of Care Goals and Progress:    Goal 1.  Improved blood pressure readings     Progress:  Spouse reports blood pressure readings fluctuate but not as much as last month. Reports pt doesn't seem as lethargic      Barriers:  Chronic health conditions     Interventions:   Continue to monitor blood pressure readings.  Bring log of readings to appts. Follow heart healthy meal plan     Education:  Reviewed medications. Activity as tolerated. Fall precautions.    Goal 2. Improved DM management     Progress:  Spouse reports blood sugars have been in a good range.      Barriers:  Chronic health conditions, fluctuating blood sugar readings     Interventions:  Continue to monitor blood sugar. Follow-up with PCP as scheduled.     Education:  Medication review, fall precautions, follow heart healthy meal plan.      Patient's Concerns and Feedback (Self Management of Care):     Spouse reports pt has been having episodes of intermittently crying. Reports pt did have this in the past. Spouse will monitor and follow-up with PCP. Spouse provides care and assistance to managing pt's chronic health conditions.     Next Steps:     Follow-Up Plan:  Next outreach in about 4 weeks, around February 22, 2025      Appointments:  Reviewed     Contact Information:  Call [phone] with any questions or concerns.

## 2025-01-23 NOTE — CARE PLAN
Problem: Patient barriers to managing diabetes  Goal: Identify patient barriers to managing diabetes/long term goal  Outcome: Progressing/spouse monitoring blood sugars multiple times during the day      Problem: Knowledge deficit of factors contributing to hypertension  Goal: Demonstrate factors that can contribute to high blood pressure/long term goal  Outcome: Progressing/pt having fluctuation in blood pressure.

## 2025-01-26 DIAGNOSIS — E11.69 TYPE 2 DIABETES MELLITUS WITH OTHER SPECIFIED COMPLICATION, WITH LONG-TERM CURRENT USE OF INSULIN (HCC): ICD-10-CM

## 2025-01-26 DIAGNOSIS — Z79.4 TYPE 2 DIABETES MELLITUS WITH OTHER SPECIFIED COMPLICATION, WITH LONG-TERM CURRENT USE OF INSULIN (HCC): ICD-10-CM

## 2025-01-27 RX ORDER — PEN NEEDLE, DIABETIC 32GX 5/32"
NEEDLE, DISPOSABLE MISCELLANEOUS
Qty: 500 EACH | Refills: 3 | Status: SHIPPED | OUTPATIENT
Start: 2025-01-27

## 2025-01-27 NOTE — TELEPHONE ENCOUNTER
Received request via: Pharmacy    Was the patient seen in the last year in this department? Yes    Does the patient have an active prescription (recently filled or refills available) for medication(s) requested? No    Pharmacy Name: [facility]      Does the patient have retirement Plus and need 100-day supply? (This applies to ALL medications) Yes, quantity updated to 100 days

## 2025-01-28 NOTE — TELEPHONE ENCOUNTER
----- Message from Av Bob sent at 9/16/2021  7:58 PM PDT -----  Regarding: Eye prescription   Hi Dr. Bunch,  I was at [facility] today and they did not have the prescription for Av’s eye medication.  I see that you sent it, but that  particular [facility]’s system was down for two days. I think that your order never went through.  Could you possibly re-send it? They are back up and running now.    Thank you,  Usha   Lab Results   Component Value Date    CHOL 112 (L) 07/12/2024    HDL 41 07/12/2024    LDLCALC 49.8 (L) 07/12/2024    TRIG 106 07/12/2024     -Continue Statin

## 2025-02-05 ENCOUNTER — PATIENT MESSAGE (OUTPATIENT)
Dept: CARDIOLOGY | Facility: MEDICAL CENTER | Age: 80
End: 2025-02-05
Payer: MEDICARE

## 2025-02-06 NOTE — PATIENT COMMUNICATION
JA:Please advise   Patient still experiencing labile BP after meals and had near syncopal episode. In latest episode, patient BP dropped to 85/58 and normalized after lying down

## 2025-02-12 DIAGNOSIS — F33.41 RECURRENT MAJOR DEPRESSIVE DISORDER, IN PARTIAL REMISSION (HCC): ICD-10-CM

## 2025-02-12 RX ORDER — FLUOXETINE HYDROCHLORIDE 40 MG/1
40 CAPSULE ORAL
Qty: 90 CAPSULE | Refills: 3 | Status: SHIPPED | OUTPATIENT
Start: 2025-02-12

## 2025-02-12 NOTE — TELEPHONE ENCOUNTER
Received request via: Pharmacy    Was the patient seen in the last year in this department? Yes    Does the patient have an active prescription (recently filled or refills available) for medication(s) requested? No    Pharmacy Name: [facility]    Does the patient have long-term Plus and need 100-day supply? (This applies to ALL medications) Yes, quantity updated to 100 days

## 2025-02-14 ENCOUNTER — APPOINTMENT (OUTPATIENT)
Dept: PHYSICAL MEDICINE AND REHAB | Facility: MEDICAL CENTER | Age: 80
End: 2025-02-14
Payer: MEDICARE

## 2025-02-18 ENCOUNTER — PATIENT MESSAGE (OUTPATIENT)
Dept: CARDIOLOGY | Facility: MEDICAL CENTER | Age: 80
End: 2025-02-18
Payer: MEDICARE

## 2025-02-27 ENCOUNTER — OFFICE VISIT (OUTPATIENT)
Dept: MEDICAL GROUP | Facility: PHYSICIAN GROUP | Age: 80
End: 2025-02-27
Payer: MEDICARE

## 2025-02-27 VITALS
OXYGEN SATURATION: 98 % | HEIGHT: 72 IN | HEART RATE: 94 BPM | TEMPERATURE: 95.8 F | WEIGHT: 164.6 LBS | RESPIRATION RATE: 16 BRPM | BODY MASS INDEX: 22.29 KG/M2 | SYSTOLIC BLOOD PRESSURE: 102 MMHG | DIASTOLIC BLOOD PRESSURE: 60 MMHG

## 2025-02-27 DIAGNOSIS — Z86.73 HISTORY OF CEREBROVASCULAR ACCIDENT (CVA) IN ADULTHOOD: ICD-10-CM

## 2025-02-27 DIAGNOSIS — N18.2 TYPE 2 DIABETES MELLITUS WITH STAGE 2 CHRONIC KIDNEY DISEASE, WITH LONG-TERM CURRENT USE OF INSULIN (HCC): ICD-10-CM

## 2025-02-27 DIAGNOSIS — Z51.5 ENCOUNTER FOR PALLIATIVE CARE: ICD-10-CM

## 2025-02-27 DIAGNOSIS — Z79.4 TYPE 2 DIABETES MELLITUS WITH STAGE 2 CHRONIC KIDNEY DISEASE, WITH LONG-TERM CURRENT USE OF INSULIN (HCC): ICD-10-CM

## 2025-02-27 DIAGNOSIS — G81.94 LEFT HEMIPARESIS (HCC): ICD-10-CM

## 2025-02-27 DIAGNOSIS — D50.9 IRON DEFICIENCY ANEMIA, UNSPECIFIED IRON DEFICIENCY ANEMIA TYPE: ICD-10-CM

## 2025-02-27 DIAGNOSIS — E11.22 TYPE 2 DIABETES MELLITUS WITH STAGE 2 CHRONIC KIDNEY DISEASE, WITH LONG-TERM CURRENT USE OF INSULIN (HCC): ICD-10-CM

## 2025-02-27 DIAGNOSIS — R41.89 COGNITIVE DECLINE: ICD-10-CM

## 2025-02-27 DIAGNOSIS — E86.1 HYPOTENSION DUE TO HYPOVOLEMIA: ICD-10-CM

## 2025-02-27 DIAGNOSIS — R63.4 UNINTENTIONAL WEIGHT LOSS: ICD-10-CM

## 2025-02-27 LAB
HBA1C MFR BLD: 6.9 % (ref ?–5.8)
POCT INT CON NEG: NEGATIVE
POCT INT CON POS: POSITIVE

## 2025-02-27 RX ORDER — METHENAMINE HIPPURATE 1000 MG/1
1 TABLET ORAL
COMMUNITY

## 2025-02-27 ASSESSMENT — FIBROSIS 4 INDEX: FIB4 SCORE: 1.45

## 2025-02-27 ASSESSMENT — PATIENT HEALTH QUESTIONNAIRE - PHQ9: CLINICAL INTERPRETATION OF PHQ2 SCORE: 0

## 2025-02-28 NOTE — PROGRESS NOTES
Subjective:   Chief Complaint/History of Present Illness:  Av Bob is a 79 y.o. male established patient who presents today to discuss medical problems as listed below. Av is accompanied by his wife, Usha.    History of Present Illness  The patient presents for evaluation of weight loss, blood pressure management, and blood glucose management.    He has experienced a significant weight loss, dropping from 192 pounds in August to 164 pounds currently. His morning meal has been reduced due to concerns about blood pressure fluctuations. Previously, his breakfast included fresh fruit, Greek yogurt, eggs, sausage, avocado, tomato, and an English muffin. Currently, he consumes fresh fruit, yogurt, juice, one egg, and an English muffin, with occasional sausage or avocado. His dinner remains unchanged. The patient's appetite remains robust. He is unable to stand unassisted and uses briefs for urinary and bowel movements. He has expressed interest in hospice care. He has been receiving physical therapy at home twice a week. He has considered acquiring a Keven lift and a hospital bed for the patient. He does not have any skin issues. He gets up for his meals. He always wears socks on his feet, sometimes double. One area on one foot can get a little red, so he puts a pad there. In general, his feet have looked pretty good. He checks them every day.    Blood pressure episodes occur predominantly post-breakfast, occasionally post-dinner. The most recent episode of hypotension occurred on 02/04/2025, rendering him unresponsive and unable to speak or assist with transfers. His blood pressure dropped from 128/81 to 91/63 after breakfast but has since stabilized. He was on a reduced dose of olmesartan (20 mg), but upon discontinuation, he experienced hypertensive episodes, including a reading of 174/101 at night. He has since resumed taking half a tablet of olmesartan 20 mg at midday and the other half at bedtime,  which appears to have helped. Blood pressure readings are taken before and after meals, with this morning's readings being 113/75 pre-breakfast and 117/74 post-breakfast. He was hospitalized in 11/2024 with these symptoms, but no cause was identified. He was diagnosed with a UTI, which was suggested as a potential cause of his hypotension. He has since seen a urologist, who did not address the UTI. He has increased his water intake to 64 ounces daily.    His A1c level is stable at 6.9, down from 7.7. He has been monitoring his blood glucose levels, which have been improving. He has been sleeping more during the day and less at night, and has been feeling weak, but is still able to assist with transfers.    Supplemental Information  He has discontinued finasteride. He takes baclofen 5 mg nightly for leg pain and cramps, which has been effective. He also takes Tylenol as needed.    MEDICATIONS  Current: Olmesartan, baclofen, Tylenol  Discontinued: Finasteride     Current Medications:  Current Outpatient Medications Ordered in Epic   Medication Sig Dispense Refill    fluoxetine (PROZAC) 40 MG capsule TAKE 1 CAPSULE BY MOUTH EVERY DAY 90 Capsule 3    BD PEN NEEDLE SWATI 2ND GEN USE FIVE TIMES DAILY FOR INSULIN SHOTS 500 Each 3    multivitamin Tab Take 1 Tablet by mouth every 48 hours.      baclofen (LIORESAL) 5 MG Tab TAKE 1 TABLET BY MOUTH EVERY EVENING. MAY CONSIDER 1/2 TABLET BY MOUTH AS NEEDED 60 Tablet 2    acetaminophen (TYLENOL) 500 MG Tab Take 500-1,000 mg by mouth every 6 hours as needed. Indications: Pain      Continuous Glucose Sensor (FREESTYLE LLOYD 3 PLUS SENSOR) Misc 1 Each see administration instructions. [place] 3 CGM 15 day sensors 6 Each 3    allopurinol (ZYLOPRIM) 100 MG Tab Take 1 Tablet by mouth every evening. 100 Tablet 3    olmesartan (BENICAR) 40 MG Tab TAKE 1 TABLET BY MOUTH EVERY EVENING 90 Tablet 2    Insulin Glargine, 1 Unit Dial, (TOUJEO SOLOSTAR) 300 UNIT/ML Solution Pen-injector Inject  21-36 Units under the skin every day. (Patient taking differently: Inject 5-17 Units under the skin every day.) 7.5 mL 3    glucose blood (ONETOUCH ULTRA) strip 1 Strip by Other route 4 Times a Day,Before Meals and at Bedtime. 400 Strip 3    Potassium Chloride CR (MICRO-K) 8 MEQ Cap CR capsule TAKE 1 CAPSULE BY MOUTH EVERY 48 HOURS 45 Capsule 3    clopidogrel (PLAVIX) 75 MG Tab TAKE 1 TABLET BY MOUTH EVERY DAY 90 Tablet 3    atorvastatin (LIPITOR) 80 MG tablet TAKE 1 TABLET BY MOUTH EVERY EVENING 100 Tablet 3    HUMALOG KWIKPEN 100 UNIT/ML Solution Pen-injector injection PEN INJECT 5 UNITS UNDER SKIN 3 TIMES A DAY BEFORE MEALS. 5 UNITS FOR SUGARS= 101-150. INCREASE BY 2 UNITS IF>150. CORRECTION DOSAGE IS 2:50>150 (Patient taking differently: Inject 5-7 Units under the skin 3 times a day before meals. INJECT 5 UNITS UNDER SKIN 3 TIMES A DAY BEFORE MEALS. 5 UNITS FOR SUGARS= 101-150. INCREASE BY 2 UNITS IF>150. CORRECTION DOSAGE IS 2:50>150) 9 mL 3    Probiotic Product (PROBIOTIC DAILY) Cap Take 1 Capsule by mouth in the morning, at noon, and at bedtime.      Cholecalciferol (VITAMIN D) 2000 UNIT Tab Take 2,000 Units by mouth every morning.      magnesium oxide (MAG-OX) 400 MG Tab tablet Take 800 mg by mouth 2 times a day.      omeprazole (PRILOSEC) 20 MG delayed-release capsule Take 1 Capsule by mouth every day. 30 Capsule 2    methenamine hip (HIPPREX) 1 GM Tab Take 1 Tablet by mouth every day.       No current [facility]-ordered facility-administered medications on file.          Objective:   Physical Exam:    Vitals: /60 (BP Location: Right arm, Patient Position: Sitting, BP Cuff Size: Adult)   Pulse 94   Temp (!) 35.4 °C (95.8 °F) (Temporal)   Resp 16   Ht 1.829 m (6')   Wt 74.7 kg (164 lb 9.6 oz)   SpO2 98%    BMI: Body mass index is 22.32 kg/m².  Physical Exam  Constitutional:       General: He is not in acute distress.     Appearance: He is not toxic-appearing.      Comments: Chronically ill appearing    HENT:      Right Ear: External ear normal.      Left Ear: External ear normal.   Eyes:      General: No scleral icterus.     Conjunctiva/sclera: Conjunctivae normal.   Cardiovascular:      Rate and Rhythm: Normal rate and regular rhythm.      Pulses: Normal pulses.   Pulmonary:      Effort: Pulmonary effort is normal. No respiratory distress.      Breath sounds: Normal breath sounds. No wheezing or rhonchi.   Abdominal:      General: Bowel sounds are normal.      Palpations: Abdomen is soft.   Musculoskeletal:      Right lower leg: No edema.      Left lower leg: No edema.      Comments: Left hemiplegia   Skin:     General: Skin is warm and dry.   Neurological:      Gait: Gait abnormal.   Psychiatric:         Mood and Affect: Mood normal.         Behavior: Behavior normal.      Comments: Tired during the visit, answers yes/no questions appropriately           Results  Laboratory Studies  A1c is 6.9. Neutrophils were increased. Kidney function was normal. Low protein levels for albumin and total protein. Alkaline phosphatase levels have been fluctuating, currently around 160s to 180s.    Assessment & Plan  1. Weight loss, unintentional.  He has experienced a significant weight loss of approximately 25 pounds since the last visit. This weight loss, coupled with his history of stroke, cancer, and infection, suggests a potential underlying condition. His alkaline phosphatase levels have been fluctuating, which could be attributed to various factors such as bone metastasis, liver disease, or gastrointestinal issues like vomiting and diarrhea. A comprehensive lab evaluation will be conducted to investigate potential causes of the weight loss, including iron deficiency anemia and liver enzyme abnormalities. A referral to hospice care will be initiated to optimize his quality of life. If the weight loss persists, a CT scan of the chest, abdomen, and pelvis may be considered to identify any obvious sources.    2. Labile  blood pressure, hypotension  He has been experiencing episodes of low blood pressure, particularly after meals. His current medication regimen includes olmesartan 20 mg taken twice daily. Despite these episodes, his blood pressure has shown improvement and stability recently. He is advised to continue monitoring his blood pressure before and after meals and to maintain adequate hydration. If significant drops in blood pressure continue, further adjustments to his medication regimen may be necessary.    3. Type 2 diabetes with CKD and long term insulin use  His A1c level has decreased from 7.7 to 6.9, indicating improved blood glucose control. He is advised to continue his current diabetes management plan, including regular monitoring of blood glucose levels. If there are significant discrepancies between his continuous glucose monitor readings and fingerstick measurements, further calibration of the device may be necessary.    4. History of stroke  5. Left hemiplegia  6. Cognitive decline  Significant disability from previous stroke which occurred when he acquired norovirus while on chemotherapy treatment for lymphoma. Subsequent left hemiplegia with pain and spasticity and loss of functionality. Now with multiple medical complications which have led cognitive decline. Will place referral to hospice to see if he would be eligible with new unintended weight loss and get more information for Jean-Claude.      Assessment and Plan:   Av is a 79 y.o. male with the following:  Problem List Items Addressed This Visit       (HCC) Left hemiparesis    Relevant Orders    Referral to Hospice    Referral to Hospice    Cognitive decline    H/O Cerebrovascular accident (CVA) in adulthood    Relevant Orders    Referral to Hospice    Referral to Hospice    Hypotension    Relevant Orders    Referral to Hospice    Referral to Hospice    Iron deficiency anemia    Relevant Orders    CBC WITH DIFFERENTIAL    IRON/TOTAL IRON BIND     FERRITIN    Type 2 diabetes mellitus with stage 2 chronic kidney disease, with long-term current use of insulin (HCC)    Relevant Orders    POCT  A1C (Completed)    Referral to Hospice    Referral to Hospice    Unintentional weight loss    Relevant Orders    Referral to Hospice    Referral to Hospice    CRP QUANTITIVE (NON-CARDIAC)    Sed Rate    TSH WITH REFLEX TO FT4     Other Visit Diagnoses         Encounter for palliative care                   RTC: Return in about 3 months (around 5/27/2025).    I spent a total of 38 minutes with record review, exam, communication with the patient, communication with other providers, and documentation of this encounter.    Verbal consent was acquired by the patient to use [employer] ambient listening note generation during this visit Yes       PLEASE NOTE: This dictation was created using voice recognition software. I have made every reasonable attempt to correct obvious errors, but I expect that there are errors of grammar and possibly content that I did not discover before finalizing the note.      Madison Bunch, DO  Geriatric and Internal Medicine  [facility]

## 2025-03-03 ENCOUNTER — PATIENT OUTREACH (OUTPATIENT)
Dept: HEALTH INFORMATION MANAGEMENT | Facility: OTHER | Age: 80
End: 2025-03-03
Payer: MEDICARE

## 2025-03-03 DIAGNOSIS — N18.32 TYPE 2 DIABETES MELLITUS WITH STAGE 3B CHRONIC KIDNEY DISEASE, WITH LONG-TERM CURRENT USE OF INSULIN (HCC): ICD-10-CM

## 2025-03-03 DIAGNOSIS — E11.22 TYPE 2 DIABETES MELLITUS WITH STAGE 3B CHRONIC KIDNEY DISEASE, WITH LONG-TERM CURRENT USE OF INSULIN (HCC): ICD-10-CM

## 2025-03-03 DIAGNOSIS — Z79.4 TYPE 2 DIABETES MELLITUS WITH STAGE 3B CHRONIC KIDNEY DISEASE, WITH LONG-TERM CURRENT USE OF INSULIN (HCC): ICD-10-CM

## 2025-03-03 DIAGNOSIS — I15.2 HYPERTENSION ASSOCIATED WITH DIABETES (HCC): ICD-10-CM

## 2025-03-03 DIAGNOSIS — E11.59 HYPERTENSION ASSOCIATED WITH DIABETES (HCC): ICD-10-CM

## 2025-03-03 NOTE — PROGRESS NOTES
3/3/25 3:15 pm: Nurse FREDO outreach call to pt for monthly CCM assessment.  VM left requesting a return call to nurse at [phone].    3/3/25 3:30 pm: Nurse FREDO received return call from pt's spouse, Usha Bob.  Monthly CCM assessment completed.    Reason for Follow-Up:    Monthly CCM assessment for personal care management services.    Current Health Status:    Pt following in personal care management for DM,HTN, and dyslipidemia.     Medical Updates:  Spouse reports pt had recent visit with PCP. Spouse reports pt has been losing weight. Reports they discussed with PCP at recent visit. Per spouse pt has lost about 28 lbs in the past past 6 months.  Spouse reports pt has been sleeping a lot. Reports pt slept through most of the recent appt with PCP.   Spouse reports pt has been getting weaker. Spouse reports pt blood pressure has been more stable. Reports the wide fluctuations that were occurring have improved.  Pt following with cardiology.     Pt has history of DM.  Spouse reports pt's blood sugars have been in a good range. Reports blood sugar this am was 95.  Last A1C completed 2/27/25 and result was 6.9.  Spouse reports no problems with low blood sugar readings.    Spouse reports PCP did referral to hospice care. Reports nurse from [employer] came today for a visit.  Reports on Wednesday nurse from [facility] will be making a visit. Spouse reports she is still not sure about having pt go on hospice care and is currently getting information from several agencies.  Spouse will send message to PCP if any additional questions.      Medication Updates:  No changes to current medications    Symptoms:  Weight loss, weakness, fatigue per spouse.    Plan of Care Goals and Progress:    Goal 1. Maintain blood sugars in good range     Progress:  Spouse reports blood sugars have been stable.  Reports no low readings      Barriers:  Chronic health conditions     Interventions:  Continue to monitor blood sugars,  get labs completed as ordered. Monitor for low blood sugar readings.     Education:  Ongoing diabetes management. Spouse aware of symptoms of low blood sugar and treatment. Continue to check feet daily for any cuts or sores.    Goal 2. Maintain blood pressure in normal range     Progress:  Spouse reports some progress regarding wide fluctuations in readings. B/P at last clinic visit was 102/60      Barriers:  Chronic health conditions     Interventions:  Continue to check readings with home monitor. Follow-up with cardiology as scheduled.     Education:  Continue to work on following heart healthy meal plan.  Follow-up with PCP and specialists as scheduled. Bring log of blood pressure readings to appts.       Patient's Concerns and Feedback (Self Management of Care):     Pt's weight loss is concern for spouse.  Spouse will continue to monitor pt including blood sugar and blood pressure readings.     Next Steps:     Follow-Up Plan:  Follow-up in one month around April 2025.      Appointments:  Cardiology 5/6/25 at 3:15 pm.      Contact Information:  Call [phone] with any questions or concerns.

## 2025-03-04 NOTE — CARE PLAN
Problem: Patient barriers to managing diabetes  Goal: Identify patient barriers to managing diabetes/long term goal  Outcome: Progressing     Problem: Knowledge deficit of hypertension  Goal: Demonstrate Knowledge of Hypertension/long term goal  Outcome: Progressing     Problem: Patient barriers to managing high blood pressure  Goal: Identify barriers to managing blood pressure/long term goal  Outcome: Progressing     Problem: Adherence to prescribed medications  Goal: Improve medication adherence/long term goal  Outcome: Progressing/spouse managing pt's medications

## 2025-03-06 ENCOUNTER — HOSPITAL ENCOUNTER (OUTPATIENT)
Dept: LAB | Facility: MEDICAL CENTER | Age: 80
End: 2025-03-06
Attending: PHYSICIAN ASSISTANT
Payer: MEDICARE

## 2025-03-06 ENCOUNTER — HOSPITAL ENCOUNTER (OUTPATIENT)
Dept: LAB | Facility: MEDICAL CENTER | Age: 80
End: 2025-03-06
Attending: INTERNAL MEDICINE
Payer: MEDICARE

## 2025-03-06 DIAGNOSIS — N18.2 CKD (CHRONIC KIDNEY DISEASE), STAGE II: ICD-10-CM

## 2025-03-06 DIAGNOSIS — R63.4 UNINTENTIONAL WEIGHT LOSS: ICD-10-CM

## 2025-03-06 DIAGNOSIS — E78.5 HYPERLIPIDEMIA ASSOCIATED WITH TYPE 2 DIABETES MELLITUS (HCC): ICD-10-CM

## 2025-03-06 DIAGNOSIS — D50.9 IRON DEFICIENCY ANEMIA, UNSPECIFIED IRON DEFICIENCY ANEMIA TYPE: ICD-10-CM

## 2025-03-06 DIAGNOSIS — I25.10 CORONARY ARTERY DISEASE INVOLVING NATIVE CORONARY ARTERY OF NATIVE HEART WITHOUT ANGINA PECTORIS: ICD-10-CM

## 2025-03-06 DIAGNOSIS — I10 ESSENTIAL HYPERTENSION: ICD-10-CM

## 2025-03-06 DIAGNOSIS — E11.69 HYPERLIPIDEMIA ASSOCIATED WITH TYPE 2 DIABETES MELLITUS (HCC): ICD-10-CM

## 2025-03-06 LAB
ANION GAP SERPL CALC-SCNC: 11 MMOL/L (ref 7–16)
BASOPHILS # BLD AUTO: 0.5 % (ref 0–1.8)
BASOPHILS # BLD: 0.07 K/UL (ref 0–0.12)
BUN SERPL-MCNC: 23 MG/DL (ref 8–22)
CALCIUM SERPL-MCNC: 9.1 MG/DL (ref 8.5–10.5)
CHLORIDE SERPL-SCNC: 101 MMOL/L (ref 96–112)
CHOLEST SERPL-MCNC: 114 MG/DL (ref 100–199)
CO2 SERPL-SCNC: 24 MMOL/L (ref 20–33)
CREAT SERPL-MCNC: 1.34 MG/DL (ref 0.5–1.4)
CRP SERPL HS-MCNC: 0.51 MG/DL (ref 0–0.75)
EOSINOPHIL # BLD AUTO: 0.2 K/UL (ref 0–0.51)
EOSINOPHIL NFR BLD: 1.5 % (ref 0–6.9)
ERYTHROCYTE [DISTWIDTH] IN BLOOD BY AUTOMATED COUNT: 49.5 FL (ref 35.9–50)
ERYTHROCYTE [SEDIMENTATION RATE] IN BLOOD BY WESTERGREN METHOD: 16 MM/HOUR (ref 0–20)
FERRITIN SERPL-MCNC: 185 NG/ML (ref 22–322)
GFR SERPLBLD CREATININE-BSD FMLA CKD-EPI: 54 ML/MIN/1.73 M 2
GLUCOSE SERPL-MCNC: 159 MG/DL (ref 65–99)
HCT VFR BLD AUTO: 44.5 % (ref 42–52)
HDLC SERPL-MCNC: 41 MG/DL
HGB BLD-MCNC: 14.5 G/DL (ref 14–18)
IMM GRANULOCYTES # BLD AUTO: 0.08 K/UL (ref 0–0.11)
IMM GRANULOCYTES NFR BLD AUTO: 0.6 % (ref 0–0.9)
IRON SATN MFR SERPL: 21 % (ref 15–55)
IRON SERPL-MCNC: 42 UG/DL (ref 50–180)
LDLC SERPL CALC-MCNC: 50 MG/DL
LYMPHOCYTES # BLD AUTO: 1 K/UL (ref 1–4.8)
LYMPHOCYTES NFR BLD: 7.5 % (ref 22–41)
MCH RBC QN AUTO: 27.7 PG (ref 27–33)
MCHC RBC AUTO-ENTMCNC: 32.6 G/DL (ref 32.3–36.5)
MCV RBC AUTO: 84.9 FL (ref 81.4–97.8)
MONOCYTES # BLD AUTO: 0.74 K/UL (ref 0–0.85)
MONOCYTES NFR BLD AUTO: 5.5 % (ref 0–13.4)
NEUTROPHILS # BLD AUTO: 11.29 K/UL (ref 1.82–7.42)
NEUTROPHILS NFR BLD: 84.4 % (ref 44–72)
NRBC # BLD AUTO: 0 K/UL
NRBC BLD-RTO: 0 /100 WBC (ref 0–0.2)
PLATELET # BLD AUTO: 317 K/UL (ref 164–446)
PMV BLD AUTO: 10.1 FL (ref 9–12.9)
POTASSIUM SERPL-SCNC: 4 MMOL/L (ref 3.6–5.5)
RBC # BLD AUTO: 5.24 M/UL (ref 4.7–6.1)
SODIUM SERPL-SCNC: 136 MMOL/L (ref 135–145)
TIBC SERPL-MCNC: 198 UG/DL (ref 250–450)
TRIGL SERPL-MCNC: 115 MG/DL (ref 0–149)
TSH SERPL DL<=0.005 MIU/L-ACNC: 1.66 UIU/ML (ref 0.38–5.33)
UIBC SERPL-MCNC: 156 UG/DL (ref 110–370)
WBC # BLD AUTO: 13.4 K/UL (ref 4.8–10.8)

## 2025-03-06 PROCEDURE — 86140 C-REACTIVE PROTEIN: CPT

## 2025-03-06 PROCEDURE — 85025 COMPLETE CBC W/AUTO DIFF WBC: CPT

## 2025-03-06 PROCEDURE — 83540 ASSAY OF IRON: CPT

## 2025-03-06 PROCEDURE — 80061 LIPID PANEL: CPT

## 2025-03-06 PROCEDURE — 84443 ASSAY THYROID STIM HORMONE: CPT

## 2025-03-06 PROCEDURE — 80048 BASIC METABOLIC PNL TOTAL CA: CPT

## 2025-03-06 PROCEDURE — 83550 IRON BINDING TEST: CPT

## 2025-03-06 PROCEDURE — 85652 RBC SED RATE AUTOMATED: CPT

## 2025-03-06 PROCEDURE — 36415 COLL VENOUS BLD VENIPUNCTURE: CPT

## 2025-03-06 PROCEDURE — 82728 ASSAY OF FERRITIN: CPT

## 2025-03-07 ENCOUNTER — RESULTS FOLLOW-UP (OUTPATIENT)
Dept: CARDIOLOGY | Facility: MEDICAL CENTER | Age: 80
End: 2025-03-07
Payer: MEDICARE

## 2025-03-10 ENCOUNTER — RESULTS FOLLOW-UP (OUTPATIENT)
Dept: MEDICAL GROUP | Facility: PHYSICIAN GROUP | Age: 80
End: 2025-03-10
Payer: MEDICARE

## 2025-04-04 ENCOUNTER — PATIENT OUTREACH (OUTPATIENT)
Dept: HEALTH INFORMATION MANAGEMENT | Facility: OTHER | Age: 80
End: 2025-04-04
Payer: MEDICARE

## 2025-04-04 DIAGNOSIS — N18.32 TYPE 2 DIABETES MELLITUS WITH STAGE 3B CHRONIC KIDNEY DISEASE, WITH LONG-TERM CURRENT USE OF INSULIN (HCC): ICD-10-CM

## 2025-04-04 DIAGNOSIS — E11.22 TYPE 2 DIABETES MELLITUS WITH STAGE 3B CHRONIC KIDNEY DISEASE, WITH LONG-TERM CURRENT USE OF INSULIN (HCC): ICD-10-CM

## 2025-04-04 DIAGNOSIS — E11.59 HYPERTENSION ASSOCIATED WITH DIABETES (HCC): ICD-10-CM

## 2025-04-04 DIAGNOSIS — Z79.4 TYPE 2 DIABETES MELLITUS WITH STAGE 3B CHRONIC KIDNEY DISEASE, WITH LONG-TERM CURRENT USE OF INSULIN (HCC): ICD-10-CM

## 2025-04-04 DIAGNOSIS — I15.2 HYPERTENSION ASSOCIATED WITH DIABETES (HCC): ICD-10-CM

## 2025-04-04 NOTE — PROGRESS NOTES
Nurse CM outreach call for monthly CCM assessment. Pt's spouse answered telephone. Spouse reports pt is currently on hospice care with [facility]. Spouse reports pt is currently bedridden. Spouse requesting appt with PCP be cancelled. Nurse cancelled appt. Spouse also requesting assist cancelling cardiology appt. Nurse contacted scheduling for assist with cancelling cardiology appt. Spouse reports no other questions or concerns at this time. Nurse FREDO will will no longer follow pt in personal care management services at this time.Spouse agreeable with plan.

## 2025-04-28 NOTE — CARE PLAN
"The patient is Stable - Low risk of patient condition declining or worsening    Shift Goals  Clinical Goals: Safety  Patient Goals: Sleep well    Problem: Skin Integrity  Goal: Skin integrity is maintained or improved  Outcome: Progressing  Note:   Andrea Score: 16    Patient's skin remains intact and free from new or accidental injury this shift; no s/s of infection. RN wound protocol checked. Patient is on low air loss mattress. Encouraged hydration and educated about the importance of nutrition to keep skin integrity. Will continue to monitor.       Problem: Fall Risk - Rehab  Goal: Patient will remain free from falls  Outcome: Progressing  Note: Camilla Gruber Fall risk Assessment Score: 21    High fall risk Interventions   - Bed and strip alarm   - Yellow sign by the door   - Yellow wrist band \"Fall risk\"  - Room near to the nurse station  - Do not leave patient unattended in the bathroom  - Fall risk education provided     " patient

## 2025-05-21 DIAGNOSIS — Z79.4 TYPE 2 DIABETES MELLITUS WITH STAGE 3B CHRONIC KIDNEY DISEASE, WITH LONG-TERM CURRENT USE OF INSULIN (HCC): ICD-10-CM

## 2025-05-21 DIAGNOSIS — N18.32 TYPE 2 DIABETES MELLITUS WITH STAGE 3B CHRONIC KIDNEY DISEASE, WITH LONG-TERM CURRENT USE OF INSULIN (HCC): ICD-10-CM

## 2025-05-21 DIAGNOSIS — E11.22 TYPE 2 DIABETES MELLITUS WITH STAGE 3B CHRONIC KIDNEY DISEASE, WITH LONG-TERM CURRENT USE OF INSULIN (HCC): ICD-10-CM

## 2025-05-21 RX ORDER — INSULIN LISPRO 100 [IU]/ML
5-7 INJECTION, SOLUTION INTRAVENOUS; SUBCUTANEOUS
Qty: 9 ML | Refills: 3 | Status: SHIPPED | OUTPATIENT
Start: 2025-05-21

## 2025-05-21 NOTE — TELEPHONE ENCOUNTER
Received request via: Pharmacy    Was the patient seen in the last year in this department? Yes    Does the patient have an active prescription (recently filled or refills available) for medication(s) requested? No    Pharmacy Name:   [id number] - [place], [place] - [address]  [address] [address]  Phone: [phone] Fax: [phone]

## (undated) DEVICE — LACTATED RINGERS INJ 1000 ML - (14EA/CA 60CA/PF)

## (undated) DEVICE — PROTECTOR ULNA NERVE - (36PR/CA)

## (undated) DEVICE — SET EXTENSION WITH 2 PORTS (48EA/CA) ***PART #2C8610 IS A SUBSTITUTE*****

## (undated) DEVICE — HEAD HOLDER JUNIOR/ADULT

## (undated) DEVICE — FILM CASSETTE ENDO

## (undated) DEVICE — CATHETER URET OPEN END 6FR (10EA/BX)

## (undated) DEVICE — WATER IRRIG. STER 3000 ML - (4/CA)

## (undated) DEVICE — KIT ANESTHESIA W/CIRCUIT & 3/LT BAG W/FILTER (20EA/CA)

## (undated) DEVICE — SET LEADWIRE 5 LEAD BEDSIDE DISPOSABLE ECG (1SET OF 5/EA)

## (undated) DEVICE — SENSOR SPO2 NEO LNCS ADHESIVE (20/BX) SEE USER NOTES

## (undated) DEVICE — GLOVE BIOGEL SZ 7.5 SURGICAL PF LTX - (50PR/BX 4BX/CA)

## (undated) DEVICE — GOWN SURGICAL X-LARGE ULTRA - FILM-REINFORCED (20/CA)

## (undated) DEVICE — CONTAINER, SPECIMEN, STERILE

## (undated) DEVICE — ZIPWIRE STIFF .035 STRAIGHT TIP (5EA/BX)

## (undated) DEVICE — TUBING CLEARLINK DUO-VENT - C-FLO (48EA/CA)

## (undated) DEVICE — NEPTUNE 4 PORT MANIFOLD - (20/PK)

## (undated) DEVICE — JELLY, KY 2 0Z STERILE

## (undated) DEVICE — SET IRRIGATION CYSTOSCOPY Y-TYPE L81 IN (20EA/CA)

## (undated) DEVICE — ELECTRODE 850 FOAM ADHESIVE - HYDROGEL RADIOTRNSPRNT (50/PK)

## (undated) DEVICE — KIT CUSTOM PROCEDURE SINGLE FOR ENDO  (15/CA)

## (undated) DEVICE — MASK AIRWAY FLEXIBLE SINGLE-USE SIZE 5 ADULTS (10EA/BX)

## (undated) DEVICE — FORCEP RADIAL JAW 4 STANDARD CAPACITY W/NEEDLE 240CM (40EA/BX)

## (undated) DEVICE — SPONGE GAUZESTER 4 X 4 4PLY - (128PK/CA)

## (undated) DEVICE — BITE BLOCK ADULT 60FR (100EA/CA)

## (undated) DEVICE — SODIUM CHL. IRRIGATION 0.9% 3000ML (4EA/CA 65CA/PF)

## (undated) DEVICE — LASER TRAC TIP 200 MIRCON FOR 100 WATT LASER

## (undated) DEVICE — COVER FOOT UNIVERSAL DISP. - (25EA/CA)

## (undated) DEVICE — KIT ROOM DECONTAMINATION

## (undated) DEVICE — PACK CYSTOSCOPY III - (8/CA)

## (undated) DEVICE — GOWN WARMING STANDARD FLEX - (30/CA)

## (undated) DEVICE — MASK ANESTHESIA ADULT  - (100/CA)

## (undated) DEVICE — BASKET ZERO TIP

## (undated) DEVICE — TOWEL STOP TIMEOUT SAFETY FLAG (40EA/CA)

## (undated) DEVICE — BAG URODRAIN WITH TUBING - (20/CA)

## (undated) DEVICE — SLEEVE, VASO, THIGH, MED

## (undated) DEVICE — TUBE CONNECT SUCTION CLEAR 120 X 1/4" (50EA/CA)"

## (undated) DEVICE — WIRE GUIDE SENSOR DUAL FLEX - 5/BX

## (undated) DEVICE — SHEATH URET ACCESS 10/12FX35 - (6/BX)

## (undated) DEVICE — WATER IRRIG. STER. 1500 ML - (9/CA)

## (undated) DEVICE — GOWN SURGEONS X-LARGE - DISP. (30/CA)

## (undated) DEVICE — CONNECTOR HOSE NEPTUNE FOR CYSTO ROOM

## (undated) DEVICE — SUCTION INSTRUMENT YANKAUER BULBOUS TIP W/O VENT (50EA/CA)